# Patient Record
Sex: FEMALE | Race: WHITE | NOT HISPANIC OR LATINO | URBAN - METROPOLITAN AREA
[De-identification: names, ages, dates, MRNs, and addresses within clinical notes are randomized per-mention and may not be internally consistent; named-entity substitution may affect disease eponyms.]

---

## 2017-01-03 ENCOUNTER — TELEPHONE (OUTPATIENT)
Dept: HEMATOLOGY/ONCOLOGY | Facility: CLINIC | Age: 67
End: 2017-01-03

## 2017-01-03 NOTE — TELEPHONE ENCOUNTER
Returned call. Patient is scheduled for Thursday at 1130 in the OR for the bone marrow biopsy. Patient asked questions regarding what she needs to be prepared. Instructed to the patient that the nurse from the OR will call her the day before the procedure and will review the pre op procedures. Patient verbalized understanding. No other questions at this time.

## 2017-01-05 ENCOUNTER — SURGERY (OUTPATIENT)
Age: 67
End: 2017-01-05

## 2017-01-05 ENCOUNTER — ANESTHESIA EVENT (OUTPATIENT)
Dept: SURGERY | Facility: HOSPITAL | Age: 67
End: 2017-01-05
Payer: MEDICARE

## 2017-01-05 ENCOUNTER — ANESTHESIA (OUTPATIENT)
Dept: SURGERY | Facility: HOSPITAL | Age: 67
End: 2017-01-05
Payer: MEDICARE

## 2017-01-05 PROCEDURE — D9220A PRA ANESTHESIA: Mod: ANES,,, | Performed by: ANESTHESIOLOGY

## 2017-01-05 PROCEDURE — 25000003 PHARM REV CODE 250: Performed by: NURSE ANESTHETIST, CERTIFIED REGISTERED

## 2017-01-05 PROCEDURE — 63600175 PHARM REV CODE 636 W HCPCS: Performed by: NURSE ANESTHETIST, CERTIFIED REGISTERED

## 2017-01-05 PROCEDURE — D9220A PRA ANESTHESIA: Mod: CRNA,,, | Performed by: NURSE ANESTHETIST, CERTIFIED REGISTERED

## 2017-01-05 RX ORDER — ONDANSETRON 2 MG/ML
INJECTION INTRAMUSCULAR; INTRAVENOUS
Status: DISCONTINUED | OUTPATIENT
Start: 2017-01-05 | End: 2017-01-05

## 2017-01-05 RX ORDER — PROPOFOL 10 MG/ML
VIAL (ML) INTRAVENOUS
Status: DISCONTINUED | OUTPATIENT
Start: 2017-01-05 | End: 2017-01-05

## 2017-01-05 RX ORDER — LIDOCAINE HCL/PF 100 MG/5ML
SYRINGE (ML) INTRAVENOUS
Status: DISCONTINUED | OUTPATIENT
Start: 2017-01-05 | End: 2017-01-05

## 2017-01-05 RX ORDER — SODIUM CHLORIDE 9 MG/ML
INJECTION, SOLUTION INTRAVENOUS CONTINUOUS PRN
Status: DISCONTINUED | OUTPATIENT
Start: 2017-01-05 | End: 2017-01-05

## 2017-01-05 RX ADMIN — SODIUM CHLORIDE: 0.9 INJECTION, SOLUTION INTRAVENOUS at 12:01

## 2017-01-05 RX ADMIN — ONDANSETRON 4 MG: 2 INJECTION INTRAMUSCULAR; INTRAVENOUS at 01:01

## 2017-01-05 RX ADMIN — PROPOFOL 30 MG: 10 INJECTION, EMULSION INTRAVENOUS at 01:01

## 2017-01-05 RX ADMIN — LIDOCAINE HYDROCHLORIDE 100 MG: 20 INJECTION, SOLUTION INTRAVENOUS at 01:01

## 2017-01-05 RX ADMIN — PROPOFOL 50 MG: 10 INJECTION, EMULSION INTRAVENOUS at 01:01

## 2017-01-05 NOTE — TRANSFER OF CARE
"Anesthesia Transfer of Care Note    Patient: Susan Puente    Procedure(s) Performed: Procedure(s):  BIOPSY-BONE MARROW    Patient location: PACU    Anesthesia Type: general    Transport from OR: Transported from OR on room air with adequate spontaneous ventilation    Post pain: adequate analgesia    Post assessment: no apparent anesthetic complications    Post vital signs: stable    Level of consciousness: sedated    Nausea/Vomiting: no nausea/vomiting    Complications: none          Last vitals:   Visit Vitals    /60 (BP Location: Left arm, Patient Position: Lying, BP Method: Automatic)    Pulse 94    Temp 36.8 °C (98.2 °F) (Oral)    Resp 18    Ht 5' 5" (1.651 m)    Wt 84.4 kg (186 lb)    SpO2 95%    Breastfeeding No    BMI 30.95 kg/m2     "

## 2017-01-05 NOTE — ANESTHESIA PREPROCEDURE EVALUATION
01/05/2017  Susan Puente is a 66 y.o., female.    [facility] Anesthesia Evaluation         Review of Systems      Physical Exam  General:  Obesity    Airway/Jaw/Neck:  Airway Findings: Mouth Opening: Normal Tongue: Normal  General Airway Assessment: Adult  Mallampati: II  Improves to I with phonation.  TM Distance: Normal, at least 6 cm      Dental:  Dental Findings: In tact   Chest/Lungs:  Chest/Lungs Findings: Clear to auscultation, Normal Respiratory Rate     Heart/Vascular:  Heart Findings: Rate: Normal  Rhythm: Regular Rhythm  Sounds: Normal        Mental Status:  Mental Status Findings:  Cooperative, Alert and Oriented         Anesthesia Plan  Type of Anesthesia, risks & benefits discussed:  Anesthesia Type:  general, MAC  Patient's Preference: either  Intra-op Monitoring Plan:   Intra-op Monitoring Plan Comments:   Post Op Pain Control Plan:   Post Op Pain Control Plan Comments:   Induction:    Beta Blocker:  Patient is not currently on a Beta-Blocker (No further documentation required).       Informed Consent: Patient understands risks and agrees with Anesthesia plan.  Questions answered. Anesthesia consent signed with patient.  ASA Score: 2     Day of Surgery Review of History & Physical:    H&P update referred to the surgeon.         Ready For Surgery From Anesthesia Perspective.

## 2017-01-05 NOTE — ANESTHESIA POSTPROCEDURE EVALUATION
"Anesthesia Post Evaluation    Patient: Susan Puente    Procedure(s) Performed: Procedure(s):  BIOPSY-BONE MARROW    Final Anesthesia Type: general  Patient location during evaluation: PACU  Patient participation: Yes- Able to Participate  Level of consciousness: awake and alert  Post-procedure vital signs: reviewed and stable  Pain management: adequate  Airway patency: patent  PONV status at discharge: No PONV  Anesthetic complications: no      Cardiovascular status: blood pressure returned to baseline and hemodynamically stable  Respiratory status: unassisted, spontaneous ventilation and room air  Hydration status: euvolemic  Follow-up not needed.        Visit Vitals    BP (!) 83/49 (BP Location: Left arm, Patient Position: Lying, BP Method: Automatic)    Pulse 85    Temp 37 °C (98.6 °F) (Oral)    Resp 18    Ht 5' 5" (1.651 m)    Wt 84.4 kg (186 lb)    SpO2 95%    Breastfeeding No    BMI 30.95 kg/m2       Pain/Caitlyn Score: Pain Assessment Performed: Yes (1/5/2017  1:15 PM)  Presence of Pain: denies (1/5/2017  1:15 PM)  Caitlyn Score: 10 (1/5/2017  1:15 PM)      "

## 2017-01-05 NOTE — ANESTHESIA RELEASE NOTE
"Anesthesia Release from PACU Note    Patient: Susan Puente    Procedure(s) Performed: Procedure(s):  BIOPSY-BONE MARROW    Anesthesia type: general    Post pain: Adequate analgesia    Post assessment: no apparent anesthetic complications, tolerated procedure well and no evidence of recall    Last Vitals:   Visit Vitals    BP (!) 83/49 (BP Location: Left arm, Patient Position: Lying, BP Method: Automatic)    Pulse 85    Temp 37 °C (98.6 °F) (Oral)    Resp 18    Ht 5' 5" (1.651 m)    Wt 84.4 kg (186 lb)    SpO2 95%    Breastfeeding No    BMI 30.95 kg/m2       Post vital signs: stable    Level of consciousness: awake, alert  and oriented    Nausea/Vomiting: no nausea/no vomiting    Complications: none    Airway Patency: patent    Respiratory: unassisted    Cardiovascular: stable and blood pressure at baseline    Hydration: euvolemic  "

## 2017-01-06 DIAGNOSIS — D64.9 ANEMIA REQUIRING TRANSFUSIONS: Primary | ICD-10-CM

## 2017-01-12 ENCOUNTER — NURSE TRIAGE (OUTPATIENT)
Dept: ADMINISTRATIVE | Facility: CLINIC | Age: 67
End: 2017-01-12

## 2017-01-12 NOTE — TELEPHONE ENCOUNTER
Reason for Disposition   Unexplained headache that is present > 24 hours    Protocols used: ST HEADACHE-A-OH  Ms. Puente has been experiencing a headache x 2 days. She states her blood pressure is 150/69 with pulse of 108, blood sugar is 124. She denies numbness or weakness. Patient is fatigued and has a history of anemia. She states she is concern that headache is a warning sign of stroke.

## 2017-01-13 ENCOUNTER — OFFICE VISIT (OUTPATIENT)
Dept: INTERNAL MEDICINE | Facility: CLINIC | Age: 67
End: 2017-01-13
Attending: INTERNAL MEDICINE
Payer: MEDICARE

## 2017-01-13 ENCOUNTER — TELEPHONE (OUTPATIENT)
Dept: INTERNAL MEDICINE | Facility: CLINIC | Age: 67
End: 2017-01-13

## 2017-01-13 ENCOUNTER — INFUSION (OUTPATIENT)
Dept: INFUSION THERAPY | Facility: HOSPITAL | Age: 67
End: 2017-01-13
Attending: INTERNAL MEDICINE
Payer: MEDICARE

## 2017-01-13 VITALS
OXYGEN SATURATION: 99 % | HEIGHT: 65 IN | WEIGHT: 183.19 LBS | TEMPERATURE: 98 F | HEART RATE: 90 BPM | DIASTOLIC BLOOD PRESSURE: 60 MMHG | BODY MASS INDEX: 30.52 KG/M2 | SYSTOLIC BLOOD PRESSURE: 125 MMHG

## 2017-01-13 VITALS
TEMPERATURE: 98 F | RESPIRATION RATE: 16 BRPM | DIASTOLIC BLOOD PRESSURE: 67 MMHG | SYSTOLIC BLOOD PRESSURE: 146 MMHG | HEART RATE: 82 BPM

## 2017-01-13 DIAGNOSIS — D64.9 ANEMIA, UNSPECIFIED: Primary | ICD-10-CM

## 2017-01-13 DIAGNOSIS — D64.9 ANEMIA REQUIRING TRANSFUSIONS: Primary | ICD-10-CM

## 2017-01-13 DIAGNOSIS — D46.C MDS (MYELODYSPLASTIC SYNDROME) WITH 5Q DELETION: ICD-10-CM

## 2017-01-13 DIAGNOSIS — Z00.00 HEALTHCARE MAINTENANCE: ICD-10-CM

## 2017-01-13 DIAGNOSIS — D46.9 MYELODYSPLASIA (MYELODYSPLASTIC SYNDROME): ICD-10-CM

## 2017-01-13 DIAGNOSIS — Z12.31 ENCOUNTER FOR SCREENING MAMMOGRAM FOR BREAST CANCER: Primary | ICD-10-CM

## 2017-01-13 DIAGNOSIS — D63.8 ANEMIA OF OTHER CHRONIC DISEASE: Primary | ICD-10-CM

## 2017-01-13 DIAGNOSIS — D46.C MDS (MYELODYSPLASTIC SYNDROME) WITH 5Q DELETION: Primary | ICD-10-CM

## 2017-01-13 DIAGNOSIS — E11.9 CONTROLLED TYPE 2 DIABETES MELLITUS WITHOUT COMPLICATION, WITHOUT LONG-TERM CURRENT USE OF INSULIN: ICD-10-CM

## 2017-01-13 LAB
ABO + RH BLD: NORMAL
BLD GP AB SCN CELLS X3 SERPL QL: NORMAL
BLD PROD TYP BPU: NORMAL
BLOOD UNIT EXPIRATION DATE: NORMAL
BLOOD UNIT TYPE CODE: 6200
BLOOD UNIT TYPE: NORMAL
CODING SYSTEM: NORMAL
DISPENSE STATUS: NORMAL
TRANS ERYTHROCYTES VOL PATIENT: NORMAL ML

## 2017-01-13 PROCEDURE — 2022F DILAT RTA XM EVC RTNOPTHY: CPT | Mod: S$GLB,,, | Performed by: INTERNAL MEDICINE

## 2017-01-13 PROCEDURE — 4010F ACE/ARB THERAPY RXD/TAKEN: CPT | Mod: S$GLB,,, | Performed by: INTERNAL MEDICINE

## 2017-01-13 PROCEDURE — 86900 BLOOD TYPING SEROLOGIC ABO: CPT

## 2017-01-13 PROCEDURE — 99999 PR PBB SHADOW E&M-EST. PATIENT-LVL III: CPT | Mod: PBBFAC,,, | Performed by: INTERNAL MEDICINE

## 2017-01-13 PROCEDURE — 3078F DIAST BP <80 MM HG: CPT | Mod: S$GLB,,, | Performed by: INTERNAL MEDICINE

## 2017-01-13 PROCEDURE — 1160F RVW MEDS BY RX/DR IN RCRD: CPT | Mod: S$GLB,,, | Performed by: INTERNAL MEDICINE

## 2017-01-13 PROCEDURE — 99215 OFFICE O/P EST HI 40 MIN: CPT | Mod: S$GLB,,, | Performed by: INTERNAL MEDICINE

## 2017-01-13 PROCEDURE — 86901 BLOOD TYPING SEROLOGIC RH(D): CPT

## 2017-01-13 PROCEDURE — 1159F MED LIST DOCD IN RCRD: CPT | Mod: S$GLB,,, | Performed by: INTERNAL MEDICINE

## 2017-01-13 PROCEDURE — 25000003 PHARM REV CODE 250: Performed by: INTERNAL MEDICINE

## 2017-01-13 PROCEDURE — 3074F SYST BP LT 130 MM HG: CPT | Mod: S$GLB,,, | Performed by: INTERNAL MEDICINE

## 2017-01-13 PROCEDURE — 36430 TRANSFUSION BLD/BLD COMPNT: CPT

## 2017-01-13 PROCEDURE — 3044F HG A1C LEVEL LT 7.0%: CPT | Mod: S$GLB,,, | Performed by: INTERNAL MEDICINE

## 2017-01-13 PROCEDURE — 1157F ADVNC CARE PLAN IN RCRD: CPT | Mod: S$GLB,,, | Performed by: INTERNAL MEDICINE

## 2017-01-13 PROCEDURE — 1125F AMNT PAIN NOTED PAIN PRSNT: CPT | Mod: S$GLB,,, | Performed by: INTERNAL MEDICINE

## 2017-01-13 RX ORDER — HYDROCODONE BITARTRATE AND ACETAMINOPHEN 500; 5 MG/1; MG/1
TABLET ORAL
Status: DISCONTINUED | OUTPATIENT
Start: 2017-01-13 | End: 2017-02-02

## 2017-01-13 RX ORDER — ACETAMINOPHEN 325 MG/1
650 TABLET ORAL
Status: COMPLETED | OUTPATIENT
Start: 2017-01-13 | End: 2017-01-13

## 2017-01-13 RX ORDER — HYDROCODONE BITARTRATE AND ACETAMINOPHEN 500; 5 MG/1; MG/1
TABLET ORAL ONCE
Status: CANCELLED | OUTPATIENT
Start: 2017-01-13 | End: 2017-01-13

## 2017-01-13 RX ORDER — HYDROCODONE BITARTRATE AND ACETAMINOPHEN 500; 5 MG/1; MG/1
TABLET ORAL ONCE
Status: COMPLETED | OUTPATIENT
Start: 2017-01-13 | End: 2017-01-13

## 2017-01-13 RX ORDER — ACETAMINOPHEN 325 MG/1
650 TABLET ORAL
Status: DISCONTINUED | OUTPATIENT
Start: 2017-01-13 | End: 2019-03-13

## 2017-01-13 RX ORDER — DIPHENHYDRAMINE HCL 25 MG
25 CAPSULE ORAL
Status: CANCELLED | OUTPATIENT
Start: 2017-01-13

## 2017-01-13 RX ORDER — DIPHENHYDRAMINE HCL 25 MG
25 CAPSULE ORAL
Status: DISCONTINUED | OUTPATIENT
Start: 2017-01-13 | End: 2017-06-08

## 2017-01-13 RX ORDER — ACETAMINOPHEN 325 MG/1
650 TABLET ORAL
Status: CANCELLED | OUTPATIENT
Start: 2017-01-13

## 2017-01-13 RX ORDER — DIPHENHYDRAMINE HCL 25 MG
25 CAPSULE ORAL
Status: COMPLETED | OUTPATIENT
Start: 2017-01-13 | End: 2017-01-13

## 2017-01-13 RX ADMIN — DIPHENHYDRAMINE HYDROCHLORIDE 25 MG: 25 CAPSULE ORAL at 05:01

## 2017-01-13 RX ADMIN — ACETAMINOPHEN 650 MG: 325 TABLET ORAL at 05:01

## 2017-01-13 RX ADMIN — SODIUM CHLORIDE: 0.9 INJECTION, SOLUTION INTRAVENOUS at 05:01

## 2017-01-13 NOTE — MR AVS SNAPSHOT
[facility] - [facility] Medicine  [address]  [facility] [address]  Phone: [phone]  Fax: [phone]                  Susan Puente   2017 1:00 PM   Office Visit    Description:  Female : 1950   Provider:  Claudia Rapp MD   Department:  [facility] Internal Medicine           Reason for Visit     Sinus Problem           Diagnoses this Visit        Comments    Anemia requiring transfusions    -  Primary     Controlled type 2 diabetes mellitus without complication, without long-term current use of insulin         Myelodysplasia (myelodysplastic syndrome)                To Do List           Future Appointments        Provider Department Dept Phone    2017 1:40 PM LAB, APPOINTMENT [facility] [phone]    2017 8:00 AM Claudia Rapp MD [facility] Internal Medicine [phone]    2017 9:00 AM LAB, [facility]ONC SAME DAY [facility] [phone]    2017 10:00 AM Gita Valdes MD [facility] [phone]      Goals (5 Years of Data)     None      [facility] On Call     [facility] On [facility] Nurse Care Line - 24/7 Assistance  Registered nurses in the [facility] provide clinical advisement, health education, appointment booking, and other advisory services.  Call for this free service at [phone].             Medications           Message regarding Medications     Verify the changes and/or additions to your medication regime listed below are the same as discussed with your clinician today.  If any of these changes or additions are incorrect, please notify your healthcare provider.             Verify that the below list of medications is an accurate representation of the medications you are currently taking.  If none reported, the list may be blank. If incorrect, please contact your healthcare provider. Carry this list with you in case of emergency.           Current Medications      "acetaminophen (TYLENOL ARTHRITIS PAIN) 650 MG TbSR Take 650 mg by mouth every 8 (eight) hours as needed (for pain).    aspirin (ECOTRIN) 81 MG EC tablet Take 81 mg by mouth once daily.    b complex vitamins capsule Take 1 capsule by mouth once daily.    fexofenadine (ALLEGRA) 180 MG tablet Take 1 tablet (180 mg total) by mouth once daily.    fish oil-omega-3 fatty acids 300-1,000 mg capsule Take 4 g by mouth nightly.     lisinopril-hydrochlorothiazide (PRINZIDE,ZESTORETIC) 20-12.5 mg per tablet Take 2 tablets by mouth once daily.    metformin (GLUCOPHAGE) 500 MG tablet Take 0.5 tablets (250 mg total) by mouth 2 (two) times daily with meals.           Clinical Reference Information           Vital Signs - Last Recorded  Most recent update: 1/13/2017  1:11 PM by Sade Maurer MA    BP Pulse Temp Ht Wt SpO2    125/60 90 98.4 °F (36.9 °C) 5' 5" (1.651 m) 83.1 kg (183 lb 3.2 oz) 99%    BMI                30.49 kg/m2          Blood Pressure          Most Recent Value    BP  125/60      Allergies as of 1/13/2017     No Known Allergies      Immunizations Administered on Date of Encounter - 1/13/2017     None      Orders Placed During Today's Visit     Future Labs/Procedures Expected by Expires    CBC auto differential  1/13/2017 3/14/2018    Comprehensive metabolic panel  1/13/2017 4/13/2017      Smoking Cessation     If you would like to quit smoking:   You may be eligible for free services if you are a [place] resident and started smoking cigarettes before September 1, 1988.  Call the [employer]oking [employer]essation [employer] ([employer]) toll free at (272) [phone] or [phone]) [phone].   Call 2-469-QUIT-NOW if you do not meet the above criteria.            "

## 2017-01-13 NOTE — PROGRESS NOTES
"Subjective:       Patient ID: Susan Puente is a 66 y.o. female.    Chief Complaint: Sinus Problem    HPI   65 yo F here for urgent eval of headache and fatigue.   She had anemia requiring transfusion, recent bone marrow bx with MDS.   Very fatigue, spends most of day in recliner.     Labs on 12/16/16 hgb 7.5.     She just accepted book keeping job. Unsure if is going to be able to work full time with recent dx.     Review of Systems   Constitutional: Positive for fatigue. Negative for fever.   Respiratory: Negative for shortness of breath.    Cardiovascular: Negative for chest pain.        Pale   Musculoskeletal: Negative.    Skin: Negative.        Objective:     Visit Vitals    /60    Pulse 90    Temp 98.4 °F (36.9 °C)    Ht 5' 5" (1.651 m)    Wt 83.1 kg (183 lb 3.2 oz)    SpO2 99%    BMI 30.49 kg/m2        Physical Exam   Constitutional: She is oriented to person, place, and time. She appears well-developed and well-nourished. No distress.   HENT:   Head: Normocephalic and atraumatic.   ttp over ethmoidal sinuses.   Erythematous nasal turbinates with mild clear-yellow rhinorrhea     Cardiovascular: Normal rate and regular rhythm.    Pale conjunctiva   Pulmonary/Chest: Effort normal. No respiratory distress. She has no wheezes. She has no rales.   Neurological: She is alert and oriented to person, place, and time.   Skin: Skin is warm and dry. She is not diaphoretic.   Psychiatric: She has a normal mood and affect. Her behavior is normal.       Assessment:       1. Anemia requiring transfusions    2. Controlled type 2 diabetes mellitus without complication, without long-term current use of insulin    3. Myelodysplasia (myelodysplastic syndrome)        Plan:       Susan was seen today for sinus problem.    Diagnoses and all orders for this visit:    Anemia requiring transfusions; Myelodysplasia (myelodysplastic syndrome)  -     CBC auto differential; Future  -     Comprehensive metabolic panel; " Future    hgb 5.7. I spoke with Dr. Valdes's office. They will transfuse her 1 uprbc today.   Keep follow up appt next week.     Over 40 minutes were spent with patient with over half of this time spent in coordination of care.

## 2017-01-13 NOTE — MR AVS SNAPSHOT
Patient Information     Patient Name Sex Susan Cheung Female 1950      Visit Information        Provider Department Dept Phone Center    2017 4:00 PM [facility], CHEMO [facility]fusion [phone] F[facility] Jones      Patient Instructions     None      Your Current Medications Are     acetaminophen (TYLENOL ARTHRITIS PAIN) 650 MG TbSR    aspirin (ECOTRIN) 81 MG EC tablet    b complex vitamins capsule    fexofenadine (ALLEGRA) 180 MG tablet    fish oil-omega-3 fatty acids 300-1,000 mg capsule    lisinopril-hydrochlorothiazide (PRINZIDE,ZESTORETIC) 20-12.5 mg per tablet    metformin (GLUCOPHAGE) 500 MG tablet      Facility-Administered Medications     0.9%  NaCl infusion (for blood administration)    0.9%  NaCl infusion (for blood administration)    acetaminophen tablet 650 mg    acetaminophen tablet 650 mg    diphenhydrAMINE capsule 25 mg    diphenhydrAMINE capsule 25 mg      Appointments for Next Year     2017  8:00 AM ESTABLISHED PATIENT (20 min.) [facility] Claudia Rapp MD    Arrive at check-in approximately 15 minutes before your scheduled appointment time. Bring all outside medical records and imaging, along with a list of your current medications and insurance card.    [facility] Bld[facility]    2017  9:00 AM NON FASTING LAB (15 min.) [facility], [facility]EM[facility] SAME DAY    Arrive at check-in approximately 15 minutes before your scheduled appointment time. Bring all outside medical records and imaging, along with a list of your current medications and insurance card.    [facility] 3rd Floor    2017 10:00 AM ESTABLISHED PATIENT (30 min.) [facility] Gita Valdes MD    Arrive at check-in approximately 15 minutes before your scheduled appointment time. Bring all outside medical records and imaging, along with a list of your current medications and insurance card.    [facility]  "Center         Default Flowsheet Data (last 24 hours)      Amb Complex Vitals Pj        01/13/17 1834 01/13/17 1814 01/13/17 1310          Measurements    Weight   83.1 kg (183 lb 3.2 oz)      Height   5' 5" (1.651 m)      BSA (Calculated - sq m)   1.95 sq meters      BMI (Calculated)   30.6      BP (!)  116/58 (!)  147/65 125/60      Temp 98.2 °F (36.8 °C) 98.2 °F (36.8 °C) 98.4 °F (36.9 °C)      Pulse 81 81 90      Resp 16 16       SpO2   99 %      Pain Assessment    Pain Score   Two              Allergies     No Known Allergies      Medications You Received from 01/12/2017 1937 to 01/13/2017 1937        Date/Time Order Dose Route Action     01/13/2017 1709 0.9%  NaCl infusion (for blood administration)   Intravenous New Bag     01/13/2017 1719 acetaminophen tablet 650 mg 650 mg Oral Given     01/13/2017 1719 diphenhydrAMINE capsule 25 mg 25 mg Oral Given      Current Discharge Medication List     Cannot display discharge medications since this is not an admission.      "

## 2017-01-13 NOTE — TELEPHONE ENCOUNTER
Received critical lab values from hematology. Person reporting critical lab name Kaylee. Hemoglobin 5.7 Hematocrit 17.1 Dr Rapp notified of critical values.

## 2017-01-14 NOTE — PLAN OF CARE
Problem: Patient Care Overview  Goal: Plan of Care Review  Outcome: Ongoing (interventions implemented as appropriate)  Patient completed 1 unit of prbc's with no signs/symptoms of reaction noted. Verbalized understanding to call MD for any questions/concerns. AVS given. Discharged home.

## 2017-01-16 PROBLEM — I70.0 CALCIFICATION OF AORTA: Status: ACTIVE | Noted: 2017-01-16

## 2017-01-16 PROBLEM — N18.30 STAGE 3 CHRONIC KIDNEY DISEASE: Status: ACTIVE | Noted: 2017-01-16

## 2017-01-19 ENCOUNTER — OFFICE VISIT (OUTPATIENT)
Dept: HEMATOLOGY/ONCOLOGY | Facility: CLINIC | Age: 67
End: 2017-01-19
Payer: MEDICARE

## 2017-01-19 ENCOUNTER — CLINICAL SUPPORT (OUTPATIENT)
Dept: SMOKING CESSATION | Facility: CLINIC | Age: 67
End: 2017-01-19
Payer: COMMERCIAL

## 2017-01-19 ENCOUNTER — OFFICE VISIT (OUTPATIENT)
Dept: INTERNAL MEDICINE | Facility: CLINIC | Age: 67
End: 2017-01-19
Payer: MEDICARE

## 2017-01-19 ENCOUNTER — LAB VISIT (OUTPATIENT)
Dept: LAB | Facility: HOSPITAL | Age: 67
End: 2017-01-19
Payer: MEDICARE

## 2017-01-19 VITALS
HEART RATE: 108 BPM | DIASTOLIC BLOOD PRESSURE: 68 MMHG | SYSTOLIC BLOOD PRESSURE: 140 MMHG | WEIGHT: 183.19 LBS | HEIGHT: 65 IN | BODY MASS INDEX: 30.52 KG/M2 | OXYGEN SATURATION: 93 % | TEMPERATURE: 100 F

## 2017-01-19 VITALS
DIASTOLIC BLOOD PRESSURE: 50 MMHG | HEART RATE: 91 BPM | TEMPERATURE: 99 F | WEIGHT: 183.19 LBS | HEIGHT: 65 IN | SYSTOLIC BLOOD PRESSURE: 116 MMHG | BODY MASS INDEX: 30.52 KG/M2 | OXYGEN SATURATION: 95 % | RESPIRATION RATE: 16 BRPM

## 2017-01-19 DIAGNOSIS — I70.0 CALCIFICATION OF AORTA: ICD-10-CM

## 2017-01-19 DIAGNOSIS — D46.C MDS (MYELODYSPLASTIC SYNDROME) WITH 5Q DELETION: Primary | ICD-10-CM

## 2017-01-19 DIAGNOSIS — D64.9 ANEMIA REQUIRING TRANSFUSIONS: ICD-10-CM

## 2017-01-19 DIAGNOSIS — N18.30 CONTROLLED TYPE 2 DIABETES MELLITUS WITH STAGE 3 CHRONIC KIDNEY DISEASE, WITHOUT LONG-TERM CURRENT USE OF INSULIN: Primary | ICD-10-CM

## 2017-01-19 DIAGNOSIS — D46.9 MYELODYSPLASTIC SYNDROME: ICD-10-CM

## 2017-01-19 DIAGNOSIS — F17.210 CIGARETTE SMOKER: Primary | ICD-10-CM

## 2017-01-19 DIAGNOSIS — E11.22 CONTROLLED TYPE 2 DIABETES MELLITUS WITH STAGE 3 CHRONIC KIDNEY DISEASE, WITHOUT LONG-TERM CURRENT USE OF INSULIN: Primary | ICD-10-CM

## 2017-01-19 DIAGNOSIS — Z00.00 HEALTH CARE MAINTENANCE: ICD-10-CM

## 2017-01-19 DIAGNOSIS — I77.9 BILATERAL CAROTID ARTERY DISEASE: ICD-10-CM

## 2017-01-19 LAB
ALBUMIN SERPL BCP-MCNC: 3.8 G/DL
ALP SERPL-CCNC: 67 U/L
ALT SERPL W/O P-5'-P-CCNC: 19 U/L
ANION GAP SERPL CALC-SCNC: 10 MMOL/L
ANISOCYTOSIS BLD QL SMEAR: ABNORMAL
AST SERPL-CCNC: 18 U/L
BASOPHILS # BLD AUTO: 0.04 K/UL
BASOPHILS NFR BLD: 0.7 %
BILIRUB SERPL-MCNC: 0.4 MG/DL
BUN SERPL-MCNC: 20 MG/DL
CALCIUM SERPL-MCNC: 10.3 MG/DL
CHLORIDE SERPL-SCNC: 105 MMOL/L
CO2 SERPL-SCNC: 26 MMOL/L
CREAT SERPL-MCNC: 1 MG/DL
DIFFERENTIAL METHOD: ABNORMAL
DOHLE BOD BLD QL SMEAR: PRESENT
EOSINOPHIL # BLD AUTO: 0.2 K/UL
EOSINOPHIL NFR BLD: 3.6 %
ERYTHROCYTE [DISTWIDTH] IN BLOOD BY AUTOMATED COUNT: ABNORMAL %
EST. GFR  (AFRICAN AMERICAN): >60 ML/MIN/1.73 M^2
EST. GFR  (NON AFRICAN AMERICAN): 58.8 ML/MIN/1.73 M^2
GLUCOSE SERPL-MCNC: 104 MG/DL
HCT VFR BLD AUTO: 20.8 %
HGB BLD-MCNC: 7.1 G/DL
LYMPHOCYTES # BLD AUTO: 1.6 K/UL
LYMPHOCYTES NFR BLD: 29.3 %
MCH RBC QN AUTO: 37.8 PG
MCHC RBC AUTO-ENTMCNC: 34.1 %
MCV RBC AUTO: 111 FL
MONOCYTES # BLD AUTO: 0.3 K/UL
MONOCYTES NFR BLD: 4.7 %
NEUTROPHILS # BLD AUTO: 3.4 K/UL
NEUTROPHILS NFR BLD: 61.7 %
OVALOCYTES BLD QL SMEAR: ABNORMAL
PLATELET # BLD AUTO: 351 K/UL
PMV BLD AUTO: 10.5 FL
POIKILOCYTOSIS BLD QL SMEAR: SLIGHT
POLYCHROMASIA BLD QL SMEAR: ABNORMAL
POTASSIUM SERPL-SCNC: 3.9 MMOL/L
PROT SERPL-MCNC: 7.6 G/DL
RBC # BLD AUTO: 1.88 M/UL
SODIUM SERPL-SCNC: 141 MMOL/L
WBC # BLD AUTO: 5.59 K/UL

## 2017-01-19 PROCEDURE — 3078F DIAST BP <80 MM HG: CPT | Mod: S$GLB,,, | Performed by: INTERNAL MEDICINE

## 2017-01-19 PROCEDURE — 80053 COMPREHEN METABOLIC PANEL: CPT

## 2017-01-19 PROCEDURE — 1126F AMNT PAIN NOTED NONE PRSNT: CPT | Mod: S$GLB,,, | Performed by: INTERNAL MEDICINE

## 2017-01-19 PROCEDURE — 1160F RVW MEDS BY RX/DR IN RCRD: CPT | Mod: S$GLB,,, | Performed by: INTERNAL MEDICINE

## 2017-01-19 PROCEDURE — 2022F DILAT RTA XM EVC RTNOPTHY: CPT | Mod: S$GLB,,, | Performed by: INTERNAL MEDICINE

## 2017-01-19 PROCEDURE — 4010F ACE/ARB THERAPY RXD/TAKEN: CPT | Mod: S$GLB,,, | Performed by: INTERNAL MEDICINE

## 2017-01-19 PROCEDURE — 1159F MED LIST DOCD IN RCRD: CPT | Mod: S$GLB,,, | Performed by: INTERNAL MEDICINE

## 2017-01-19 PROCEDURE — 3077F SYST BP >= 140 MM HG: CPT | Mod: S$GLB,,, | Performed by: INTERNAL MEDICINE

## 2017-01-19 PROCEDURE — 1157F ADVNC CARE PLAN IN RCRD: CPT | Mod: S$GLB,,, | Performed by: INTERNAL MEDICINE

## 2017-01-19 PROCEDURE — 99999 PR PBB SHADOW E&M-EST. PATIENT-LVL III: CPT | Mod: PBBFAC,,, | Performed by: INTERNAL MEDICINE

## 2017-01-19 PROCEDURE — 36415 COLL VENOUS BLD VENIPUNCTURE: CPT

## 2017-01-19 PROCEDURE — 3044F HG A1C LEVEL LT 7.0%: CPT | Mod: S$GLB,,, | Performed by: INTERNAL MEDICINE

## 2017-01-19 PROCEDURE — 99999 PR PBB SHADOW E&M-EST. PATIENT-LVL IV: CPT | Mod: PBBFAC,,, | Performed by: INTERNAL MEDICINE

## 2017-01-19 PROCEDURE — 3074F SYST BP LT 130 MM HG: CPT | Mod: S$GLB,,, | Performed by: INTERNAL MEDICINE

## 2017-01-19 PROCEDURE — 99214 OFFICE O/P EST MOD 30 MIN: CPT | Mod: 25,S$GLB,, | Performed by: INTERNAL MEDICINE

## 2017-01-19 PROCEDURE — 99404 PREV MED CNSL INDIV APPRX 60: CPT | Mod: S$GLB,,,

## 2017-01-19 PROCEDURE — 85025 COMPLETE CBC W/AUTO DIFF WBC: CPT

## 2017-01-19 PROCEDURE — 99214 OFFICE O/P EST MOD 30 MIN: CPT | Mod: S$GLB,,, | Performed by: INTERNAL MEDICINE

## 2017-01-19 RX ORDER — IBUPROFEN 200 MG
1 TABLET ORAL DAILY
Qty: 28 PATCH | Refills: 0 | Status: SHIPPED | OUTPATIENT
Start: 2017-01-19 | End: 2017-03-29 | Stop reason: SDUPTHER

## 2017-01-19 RX ORDER — LENALIDOMIDE 10 MG/1
10 CAPSULE ORAL DAILY
Qty: 30 EACH | Refills: 12 | Status: SHIPPED | OUTPATIENT
Start: 2017-01-19 | End: 2017-01-30 | Stop reason: SDUPTHER

## 2017-01-19 NOTE — PROGRESS NOTES
Subjective:       Patient ID: Susan Puente is a 66 y.o. female.    Chief Complaint: No chief complaint on file.    HPI Comments: Ms. Puente is a 66 year old female with hx of DM2, peripheral vascular disease, tobacco use, CAD, hyperlipidemia, hypertension who was hospitalized 11/10/16 for anemia. hgb 5.3  MCH 43.4 with normal WBC and platelets. Patient had normal iron stores. She was transfused 3 units of PRBCs and discharged home with hgb 8.7 on 11/11/16. She was referred for further evalutation of her anemia. On 12/16/16 patient had a normal SPEP and immunofixation. Slightly elevated kappa light chains with normal ration. Elevated vitamin b12, normal folate, JAYDEN was positive with a low titer and negative profile. Patient had a bone marrow biopsy 1/5/16 which showed the core biopsy is normocellular for age (40%); however, megakaryocytes are increased and show  frequent small, hypolobated forms. Additionally, a subset of the neutrophils are hypogranular. Blasts are not increased by either morphology (1.2%) or in the corresponding flow cytometric analysis. Fish detects a 5q deletion in 54.5% of nuclei. Cytogenetics reported 20 metaphases, 2 metaphases were normal and 18 metaphases had a 5q deletion. No additional cytogenetic abnormalities were detected. Findings consistent with 5q deletion syndrome.  Patient presents today to review her diagnosis and treatment plan. She unfortunately required another RBC transfusion. She received one unit of PRBC 1/13/17 for hgb 5.7. Today her hgb is 7.1. She has mild thrombocytosis with plt 351K.  She does not feel as fatigued as she did last week but notes marked weakness associated with stress. Plans to start working soon. She is attempting to quit smoking. She will attend her first smoking cessation session today.         Review of Systems   Constitutional: Positive for fatigue. Negative for chills, diaphoresis, fever and unexpected weight change.   HENT:  Negative for congestion, mouth sores and nosebleeds.    Eyes: Negative for photophobia and visual disturbance.   Respiratory: Negative for cough and shortness of breath.    Cardiovascular: Negative for chest pain and leg swelling.   Gastrointestinal: Negative for abdominal distention and abdominal pain.   Endocrine: Negative for polyphagia and polyuria.   Genitourinary: Negative for dysuria and hematuria.   Musculoskeletal: Negative for back pain and joint swelling.   Skin: Negative for color change and pallor.   Neurological: Negative for light-headedness and numbness.   Hematological: Negative for adenopathy. Does not bruise/bleed easily.   Psychiatric/Behavioral: Negative for agitation and behavioral problems.       Objective:      Physical Exam   Constitutional: She is oriented to person, place, and time. She appears well-developed and well-nourished.   HENT:   Left Ear: External ear normal.   Mouth/Throat: Oropharynx is clear and moist.   Eyes: Conjunctivae are normal. Pupils are equal, round, and reactive to light.   Neck: Normal range of motion.   Cardiovascular: Normal rate and regular rhythm.    Pulmonary/Chest: Effort normal and breath sounds normal.   Abdominal: Soft. Bowel sounds are normal.   Musculoskeletal: She exhibits no edema or tenderness.   Lymphadenopathy:     She has no cervical adenopathy.   Neurological: She is alert and oriented to person, place, and time. No cranial nerve deficit. Coordination normal.   Skin: Skin is warm and dry.   Psychiatric: She has a normal mood and affect. Her behavior is normal.       Assessment:       1. MDS (myelodysplastic syndrome) with 5q deletion        Plan:   Have discussed diagnosis of 5q deletion syndrome. This is a form of MDS characterized by its severe anemia, potential thrombocytosis and favorable prognosis. Lenalidomide can lead to RBC transfusion independence in at least two thirds of cases of the 5q- syndrome, two thirds of those responses persisting  after 2 years of treatment. Grade 3 or 4 neutropenia and thrombocytopenia, especially during the first 6 to 8 weeks of treatment, are the major side effect of lenalidomide. May also take some time for anemia to improve and can worsen initially as well. Will monitor closely with weekly blood counts during the first 4 weeks. Will see back in clinic after four weeks to assess for other potential side effects such as diarrhea, nausea, rash, ect. Will send script for lenalidomide 10 mg daily to our specialty pharmacy to determine coverage, approval requirements, and distribution logistics.   Continue to encourage smoking cessation.  All questions answered to satisfaction  Case discussed with Dr. Lucio Contreras MD   Pager [phone]

## 2017-01-19 NOTE — PROGRESS NOTES
"Subjective:       Patient ID: Susan Puente is a 66 y.o. female.    Chief Complaint: Follow-up    HPI   67 yo F here for follow up fatigue and headaches. Last week had hgb 5 and received 1 uprbc with improvement in symptoms. New dx MDS and has appt with Dr. Valdes later this am.   No longer having shortness of breath with exertion.     a1c 11/10/16 5.7%    Going to smoking cessation later this morning.     DM  Highest BG has been 139.  Previously using one touch. Now has Freestyle from a friend.    Review of Systems   Constitutional: Negative for fever.   Respiratory: Negative for shortness of breath.    Cardiovascular: Negative for chest pain.   Musculoskeletal: Negative.    Skin: Negative.        Objective:     Visit Vitals    BP (!) 140/68    Pulse 108    Temp 100 °F (37.8 °C)    Ht 5' 5" (1.651 m)    Wt 83.1 kg (183 lb 3.2 oz)    SpO2 (!) 93%    BMI 30.49 kg/m2        Physical Exam   Constitutional: She is oriented to person, place, and time. She appears well-developed and well-nourished. No distress.   HENT:   Head: Normocephalic and atraumatic.   Cardiovascular: Normal rate and regular rhythm.    Pulmonary/Chest: Effort normal. No respiratory distress. She has no wheezes. She has no rales.   Neurological: She is alert and oriented to person, place, and time.   Skin: Skin is warm and dry. She is not diaphoretic.   Psychiatric: She has a normal mood and affect. Her behavior is normal.       Protective Sensation (w/ 10 gram monofilament):  Right: Decreased  Left: Intact    Visual Inspection:  Normal -  Bilateral    Pedal Pulses:   Right: Present  Left: Present    Posterior tibialis:   Right:Present  Left: Present      Assessment:       1. Controlled type 2 diabetes mellitus with stage 3 chronic kidney disease, without long-term current use of insulin    2. Anemia requiring transfusions    3. Myelodysplastic syndrome    4. Bilateral carotid artery disease    5. Calcification of aorta        Plan:     "   Susan was seen today for follow-up.    Diagnoses and all orders for this visit:    Controlled type 2 diabetes mellitus with stage 3 chronic kidney disease, without long-term current use of insulin  -     Ambulatory referral to Optometry  -     Ambulatory Referral to Diabetes Education    Anemia requiring transfusions  Myelodysplastic syndrome  Labs then appt with Dr. Valdes today     Bilateral carotid artery disease  Calcification of aorta  Stable, monitor      HCM  mmg ordered  She reports she had zoster vaccine at [facility] in 2016  Will do tdap at pharmacy

## 2017-01-19 NOTE — Clinical Note
Patient needs weekly cbc for four weeks. In four weeks she needs to see me and Dr. Valdes with a cmp as well. Thank you

## 2017-01-19 NOTE — MR AVS SNAPSHOT
[facility]  [address]  [facility] [address]  Phone: [phone]  Fax: [phone]                  Susan Puente   2017 8:00 AM   Office Visit    Description:  Female : 1950   Provider:  Claudia Rapp MD   Department:  [facility]           Reason for Visit     Follow-up           Diagnoses this Visit        Comments    Controlled type 2 diabetes mellitus with stage 3 chronic kidney disease, without long-term current use of insulin    -  Primary     Anemia requiring transfusions         Myelodysplastic syndrome         Bilateral carotid artery disease         Calcification of aorta                To Do List           Future Appointments        Provider Department Dept Phone    2017 9:00 AM LAB, [facility] SAME DAY [facility] [phone]    2017 10:00 AM Gita Valdes MD [facility] [phone]      Goals (5 Years of Data)     None      Follow-Up and Disposition     Return in about 3 months (around 2017).      [facility]     [facility] Care [facility] - 24/7 Assistance  Registered nurses in the [facility] provide clinical advisement, health education, appointment booking, and other advisory services.  Call for this free service at [phone].             Medications           Message regarding Medications     Verify the changes and/or additions to your medication regime listed below are the same as discussed with your clinician today.  If any of these changes or additions are incorrect, please notify your healthcare provider.             Verify that the below list of medications is an accurate representation of the medications you are currently taking.  If none reported, the list may be blank. If incorrect, please contact your healthcare provider. Carry this list with you in case of emergency.           Current Medications     acetaminophen (TYLENOL ARTHRITIS  "PAIN) 650 MG TbSR Take 650 mg by mouth every 8 (eight) hours as needed (for pain).    aspirin (ECOTRIN) 81 MG EC tablet Take 81 mg by mouth once daily.    b complex vitamins capsule Take 1 capsule by mouth once daily.    fexofenadine (ALLEGRA) 180 MG tablet Take 1 tablet (180 mg total) by mouth once daily.    fish oil-omega-3 fatty acids 300-1,000 mg capsule Take 4 g by mouth nightly.     lisinopril-hydrochlorothiazide (PRINZIDE,ZESTORETIC) 20-12.5 mg per tablet Take 2 tablets by mouth once daily.    metformin (GLUCOPHAGE) 500 MG tablet Take 0.5 tablets (250 mg total) by mouth 2 (two) times daily with meals.           Clinical Reference Information           Vital Signs - Last Recorded  Most recent update: 1/19/2017  8:38 AM by Claudia Rapp MD    BP Pulse Temp Ht Wt SpO2    (!) 140/68 108 100 °F (37.8 °C) 5' 5" (1.651 m) 83.1 kg (183 lb 3.2 oz) (!) 93%    BMI                30.49 kg/m2          Blood Pressure          Most Recent Value    BP  (!)  140/68      Allergies as of 1/19/2017     No Known Allergies      Immunizations Administered on Date of Encounter - 1/19/2017     None      Orders Placed During Today's Visit      Normal Orders This Visit    Ambulatory Referral to Diabetes Education     Ambulatory referral to Optometry       Smoking Cessation     If you would like to quit smoking:   You may be eligible for free services if you are a [place] resident and started smoking cigarettes before September 1, 1988.  Call the Smoking Cessation Trust (SCT) toll free at [phone] or [phone].   Call 6-019-QUIT-NOW if you do not meet the above criteria.            "

## 2017-01-19 NOTE — PROGRESS NOTES
1/19/17   See Smoking Cessation Smart Form    Additional Interventions:  · Recommended patient participate in Smoking Cessation Group .  · Discussed triggers and planning for quit date.  · Given patient education handouts from [employer] Tool Kit #3  · Educated patient about and gave patient education handouts from  [employer] Drug Information on: NRT  · Provided phone number to reach [facility] CTTS (Certified Tobacco Treatment Specialist) for future assistance and numbers to 24/7 Quit lines.

## 2017-01-20 ENCOUNTER — TELEPHONE (OUTPATIENT)
Dept: DIABETES | Facility: CLINIC | Age: 67
End: 2017-01-20

## 2017-01-23 ENCOUNTER — TELEPHONE (OUTPATIENT)
Dept: HEMATOLOGY/ONCOLOGY | Facility: CLINIC | Age: 67
End: 2017-01-23

## 2017-01-25 ENCOUNTER — TELEPHONE (OUTPATIENT)
Dept: PHARMACY | Facility: CLINIC | Age: 67
End: 2017-01-25

## 2017-01-26 ENCOUNTER — TELEPHONE (OUTPATIENT)
Dept: HEMATOLOGY/ONCOLOGY | Facility: CLINIC | Age: 67
End: 2017-01-26

## 2017-01-26 ENCOUNTER — TELEPHONE (OUTPATIENT)
Dept: PHARMACY | Facility: CLINIC | Age: 67
End: 2017-01-26

## 2017-01-26 NOTE — TELEPHONE ENCOUNTER
----- Message from Elena Dominguez sent at 1/23/2017  4:25 PM CST -----  Contact: self  Pt is returning a missed call.    Contact number [phone]

## 2017-01-26 NOTE — TELEPHONE ENCOUNTER
Patient called to say she has not received her Revlimid yet informed me they were working on Co pay assistance for her.  Is asking if we need to reschedule her f/u for labs and to see md to coordinate when she starts.  Informed her she would probable still need to do her lasb as scheduled and that we would need to push back her MD follow up based on when she starts her medication.  I informed her that I would discuss with Dr Valdes and call her tomorrow to confirm.  Patient also requested her lab appts to be for 7:15 am when she needs them, also asked if she can do labs day before MD visit and if she could be the last MD appt for the day as she has started a new job and does not want to jeopardize it in anyway,  Reassured her that we could accommodate her request and give her an excuse letter when she comes for her appointments.  Patient was very grateful .

## 2017-01-26 NOTE — TELEPHONE ENCOUNTER
Pt left message on my voicemail stating that she has a new job and she works during the day.  She is unable to come for Diabetes education during the day.  I left her a message advising her to contact her pcp to let her know.  Unfortunately, we are unable to schedule at this time, we keep playing phone tag and both our schedules do not work out.

## 2017-01-27 ENCOUNTER — TELEPHONE (OUTPATIENT)
Dept: PHARMACY | Facility: CLINIC | Age: 67
End: 2017-01-27

## 2017-01-27 NOTE — TELEPHONE ENCOUNTER
FOR DOCUMENTATION ONLY:  Revlimid prior authorization approved  1/26/17 through 7/25/17  $3,356.23 co pay      [employer] [facility] Stu:   $7,500.00 award  7/31/16 through 1/27/18     BIN# [id number]  ID# [id number]  N# [id number]  GROUP# [id number]  [phone]       [facility]   [phone] Fax

## 2017-01-27 NOTE — TELEPHONE ENCOUNTER
Called patient no answering voicemail left asking her to please call back, will continue to follow up.

## 2017-01-30 ENCOUNTER — TELEPHONE (OUTPATIENT)
Dept: HEMATOLOGY/ONCOLOGY | Facility: CLINIC | Age: 67
End: 2017-01-30

## 2017-01-30 DIAGNOSIS — D46.C MDS (MYELODYSPLASTIC SYNDROME) WITH 5Q DELETION: ICD-10-CM

## 2017-01-30 RX ORDER — LENALIDOMIDE 10 MG/1
10 CAPSULE ORAL DAILY
Qty: 30 EACH | Refills: 12 | Status: SHIPPED | OUTPATIENT
Start: 2017-01-30 | End: 2017-03-08 | Stop reason: ALTCHOICE

## 2017-01-30 NOTE — TELEPHONE ENCOUNTER
----- Message from Chikis Cormier sent at 1/30/2017  8:25 AM CST -----  Contact: self  Pt is returning a missed call from (Little).  Contact number [phone]

## 2017-01-31 NOTE — TELEPHONE ENCOUNTER
rx for revlimid faxed to [facility] [phone].  auth # [id number]

## 2017-02-02 ENCOUNTER — DOCUMENTATION ONLY (OUTPATIENT)
Dept: HEMATOLOGY/ONCOLOGY | Facility: CLINIC | Age: 67
End: 2017-02-02

## 2017-02-02 ENCOUNTER — LAB VISIT (OUTPATIENT)
Dept: LAB | Facility: HOSPITAL | Age: 67
End: 2017-02-02
Attending: INTERNAL MEDICINE
Payer: MEDICARE

## 2017-02-02 DIAGNOSIS — D46.C MDS (MYELODYSPLASTIC SYNDROME) WITH 5Q DELETION: Primary | ICD-10-CM

## 2017-02-02 DIAGNOSIS — D46.C MDS (MYELODYSPLASTIC SYNDROME) WITH 5Q DELETION: ICD-10-CM

## 2017-02-02 LAB
ANISOCYTOSIS BLD QL SMEAR: SLIGHT
BASOPHILS # BLD AUTO: 0.09 K/UL
BASOPHILS NFR BLD: 1.7 %
DIFFERENTIAL METHOD: ABNORMAL
EOSINOPHIL # BLD AUTO: 0.2 K/UL
EOSINOPHIL NFR BLD: 4.2 %
ERYTHROCYTE [DISTWIDTH] IN BLOOD BY AUTOMATED COUNT: ABNORMAL %
HCT VFR BLD AUTO: 18.6 %
HGB BLD-MCNC: 6.4 G/DL
LYMPHOCYTES # BLD AUTO: 1.6 K/UL
LYMPHOCYTES NFR BLD: 31.1 %
MCH RBC QN AUTO: 38.6 PG
MCHC RBC AUTO-ENTMCNC: 34.4 %
MCV RBC AUTO: 112 FL
MONOCYTES # BLD AUTO: 0.3 K/UL
MONOCYTES NFR BLD: 5.5 %
NEUTROPHILS # BLD AUTO: 3 K/UL
NEUTROPHILS NFR BLD: 56.9 %
OVALOCYTES BLD QL SMEAR: ABNORMAL
PLATELET # BLD AUTO: 392 K/UL
PLATELET BLD QL SMEAR: ABNORMAL
PMV BLD AUTO: 10.6 FL
POIKILOCYTOSIS BLD QL SMEAR: SLIGHT
POLYCHROMASIA BLD QL SMEAR: ABNORMAL
RBC # BLD AUTO: 1.66 M/UL
WBC # BLD AUTO: 5.24 K/UL

## 2017-02-02 PROCEDURE — 36415 COLL VENOUS BLD VENIPUNCTURE: CPT

## 2017-02-02 PROCEDURE — 85025 COMPLETE CBC W/AUTO DIFF WBC: CPT

## 2017-02-02 PROCEDURE — 86920 COMPATIBILITY TEST SPIN: CPT

## 2017-02-02 RX ORDER — HYDROCODONE BITARTRATE AND ACETAMINOPHEN 500; 5 MG/1; MG/1
TABLET ORAL
Status: DISCONTINUED | OUTPATIENT
Start: 2017-02-03 | End: 2017-02-09

## 2017-02-03 ENCOUNTER — LAB VISIT (OUTPATIENT)
Dept: LAB | Facility: HOSPITAL | Age: 67
End: 2017-02-03
Attending: INTERNAL MEDICINE
Payer: MEDICARE

## 2017-02-03 DIAGNOSIS — D46.C MDS (MYELODYSPLASTIC SYNDROME) WITH 5Q DELETION: ICD-10-CM

## 2017-02-03 LAB
ABO + RH BLD: NORMAL
BLD GP AB SCN CELLS X3 SERPL QL: NORMAL

## 2017-02-03 PROCEDURE — 36415 COLL VENOUS BLD VENIPUNCTURE: CPT

## 2017-02-03 PROCEDURE — 86850 RBC ANTIBODY SCREEN: CPT

## 2017-02-03 PROCEDURE — 86900 BLOOD TYPING SEROLOGIC ABO: CPT

## 2017-02-03 PROCEDURE — 27201040 HC RC 50 FILTER

## 2017-02-04 ENCOUNTER — INFUSION (OUTPATIENT)
Dept: INFUSION THERAPY | Facility: HOSPITAL | Age: 67
End: 2017-02-04
Attending: INTERNAL MEDICINE
Payer: MEDICARE

## 2017-02-04 VITALS
DIASTOLIC BLOOD PRESSURE: 71 MMHG | TEMPERATURE: 98 F | SYSTOLIC BLOOD PRESSURE: 161 MMHG | RESPIRATION RATE: 18 BRPM | HEART RATE: 71 BPM

## 2017-02-04 DIAGNOSIS — D53.9 MACROCYTIC ANEMIA: ICD-10-CM

## 2017-02-04 LAB
BLD PROD TYP BPU: NORMAL
BLOOD UNIT EXPIRATION DATE: NORMAL
BLOOD UNIT TYPE CODE: 6200
BLOOD UNIT TYPE: NORMAL
CODING SYSTEM: NORMAL
DISPENSE STATUS: NORMAL
NUM UNITS TRANS PACKED RBC: NORMAL

## 2017-02-04 PROCEDURE — 36430 TRANSFUSION BLD/BLD COMPNT: CPT

## 2017-02-04 PROCEDURE — P9038 RBC IRRADIATED: HCPCS

## 2017-02-04 PROCEDURE — 27201040 HC RC 50 FILTER

## 2017-02-04 PROCEDURE — 25000003 PHARM REV CODE 250: Performed by: INTERNAL MEDICINE

## 2017-02-04 PROCEDURE — 86920 COMPATIBILITY TEST SPIN: CPT

## 2017-02-04 RX ORDER — HYDROCODONE BITARTRATE AND ACETAMINOPHEN 500; 5 MG/1; MG/1
TABLET ORAL ONCE
Status: DISCONTINUED | OUTPATIENT
Start: 2017-02-04 | End: 2017-02-04 | Stop reason: HOSPADM

## 2017-02-04 RX ORDER — ACETAMINOPHEN 325 MG/1
650 TABLET ORAL
Status: COMPLETED | OUTPATIENT
Start: 2017-02-04 | End: 2017-02-04

## 2017-02-04 RX ORDER — DIPHENHYDRAMINE HCL 25 MG
25 CAPSULE ORAL
Status: COMPLETED | OUTPATIENT
Start: 2017-02-04 | End: 2017-02-04

## 2017-02-04 RX ADMIN — DIPHENHYDRAMINE HYDROCHLORIDE 25 MG: 25 CAPSULE ORAL at 08:02

## 2017-02-04 RX ADMIN — ACETAMINOPHEN 650 MG: 325 TABLET ORAL at 08:02

## 2017-02-04 NOTE — PLAN OF CARE
Problem: Patient Care Overview (Adult)  Goal: Plan of Care Review  Outcome: Ongoing (interventions implemented as appropriate)  1102-Patient tolerated transfusion well. Discharged without complaints or S/S of adverse event. AVS given.  Instructed to call provider for any questions or concerns.

## 2017-02-04 NOTE — PLAN OF CARE
Problem: Patient Care Overview (Adult)  Goal: Individualization & Mutuality  Outcome: Ongoing (interventions implemented as appropriate)  0815- Patient arrived ambulatory to the unit for 1 unit blood transfusion. Labs , hx, and medications reviewed. Assessment completed. Discussed plan of care with patient. Patient in agreement. Chair reclined and warm blanket and snack offered.

## 2017-02-04 NOTE — MR AVS SNAPSHOT
Patient Information     Patient Name Sex Susan Cheung Female 1950      Visit Information        Provider Department Dept Phone Center    2017 8:15 AM INJECTION, [facility] [phone] [facility]      Patient Instructions     None      Your Current Medications Are     acetaminophen (TYLENOL ARTHRITIS PAIN) 650 MG TbSR    aspirin (ECOTRIN) 81 MG EC tablet    b complex vitamins capsule    fexofenadine (ALLEGRA) 180 MG tablet    fish oil-omega-3 fatty acids 300-1,000 mg capsule    lenalidomide (REVLIMID) 10 mg Cap    lisinopril-hydrochlorothiazide (PRINZIDE,ZESTORETIC) 20-12.5 mg per tablet    metformin (GLUCOPHAGE) 500 MG tablet    nicotine (NICODERM CQ) 21 mg/24 hr    nicotine (NICOTROL) 10 mg Crtg      Facility-Administered Medications     0.9%  NaCl infusion (for blood administration)    0.9%  NaCl infusion (for blood administration)    acetaminophen tablet 650 mg    acetaminophen tablet 650 mg    diphenhydrAMINE capsule 25 mg    diphenhydrAMINE capsule 25 mg      Appointments for Next Year     2017 10:00 AM NON FASTING LAB (15 min.) [facility] LAB, [facility]EM[facility] SAME DAY    Arrive at check-in approximately 15 minutes before your scheduled appointment time. Bring all outside medical records and imaging, along with a list of your current medications and insurance card.    [facility] 3rd Floor    2017 11:30 AM EDUCATION (60 min.) [facility]  Diabetes Program DIABETES EDUCATOR, INT MED 2    Arrive at check-in approximately 15 minutes before your scheduled appointment time. Bring all outside medical records and imaging, along with a list of your current medications and insurance card.    [facility] Bld[facility]    2017  8:00 AM NON FASTING LAB (15 min.) [facility] LAB, [facility]EM[facility] SAME DAY    Arrive at check-in approximately 15 minutes before your scheduled appointment time. Bring all  outside medical records and imaging, along with a list of your current medications and insurance card.    [facility] 3rd Floor    2/16/2017  9:00 AM ESTABLISHED PATIENT (30 min.) [facility] Gita Valdes MD    Arrive at check-in approximately 15 minutes before your scheduled appointment time. Bring all outside medical records and imaging, along with a list of your current medications and insurance card.    [facility]         Default Flowsheet Data (last 24 hours)      Amb Complex Vitals Pj        02/04/17 1055 02/04/17 0857 02/04/17 0842 02/04/17 0825       Measurements    BP (!)  161/71 (!)  140/77 123/60 123/60     Temp 98.3 °F (36.8 °C) 97.9 °F (36.6 °C) 97.9 °F (36.6 °C) 97.9 °F (36.6 °C)     Pulse  89 93 93     Resp 18 18 18 18             Allergies     No Known Allergies      Medications You Received from 02/03/2017 1059 to 02/04/2017 1059        Date/Time Order Dose Route Action     02/04/2017 0821 acetaminophen tablet 650 mg 650 mg Oral Given     02/04/2017 0821 diphenhydrAMINE capsule 25 mg 25 mg Oral Given      Current Discharge Medication List     Cannot display discharge medications since this is not an admission.

## 2017-02-05 PROCEDURE — 86644 CMV ANTIBODY: CPT

## 2017-02-07 ENCOUNTER — TELEPHONE (OUTPATIENT)
Dept: SMOKING CESSATION | Facility: CLINIC | Age: 67
End: 2017-02-07

## 2017-02-07 ENCOUNTER — TELEPHONE (OUTPATIENT)
Dept: HEMATOLOGY/ONCOLOGY | Facility: CLINIC | Age: 67
End: 2017-02-07

## 2017-02-07 NOTE — TELEPHONE ENCOUNTER
2/7/17   2:30 pm    Telephone call to patient to inform that Smoking Cessation Group was cancelled this evening due to bad weather and the first group session will occur next Tuesday, 2/14/17.

## 2017-02-07 NOTE — TELEPHONE ENCOUNTER
----- Message from Fabiola Massey sent at 2/7/2017  2:35 PM CST -----  [facility] would like the nurse to give [employer]'s a call at [phone] about the patient's Revlimid medication. [employer] can be reached at [phone] option 1 then option 5 if the doctor needs to speak with them.   Spoke with [employer]. Authorization # can be used now.

## 2017-02-08 LAB
BLD PROD TYP BPU: NORMAL
BLOOD UNIT EXPIRATION DATE: NORMAL
BLOOD UNIT TYPE CODE: 600
BLOOD UNIT TYPE: NORMAL
CODING SYSTEM: NORMAL
DISPENSE STATUS: NORMAL
NUM UNITS TRANS PACKED RBC: NORMAL

## 2017-02-09 ENCOUNTER — TELEPHONE (OUTPATIENT)
Dept: HEMATOLOGY/ONCOLOGY | Facility: CLINIC | Age: 67
End: 2017-02-09

## 2017-02-09 ENCOUNTER — LAB VISIT (OUTPATIENT)
Dept: LAB | Facility: HOSPITAL | Age: 67
End: 2017-02-09
Attending: INTERNAL MEDICINE
Payer: MEDICARE

## 2017-02-09 DIAGNOSIS — D46.C MDS (MYELODYSPLASTIC SYNDROME) WITH 5Q DELETION: Primary | ICD-10-CM

## 2017-02-09 DIAGNOSIS — D46.C MDS (MYELODYSPLASTIC SYNDROME) WITH 5Q DELETION: ICD-10-CM

## 2017-02-09 LAB
ABO + RH BLD: NORMAL
BASOPHILS # BLD AUTO: 0.08 K/UL
BASOPHILS NFR BLD: 1.4 %
BLD GP AB SCN CELLS X3 SERPL QL: NORMAL
DIFFERENTIAL METHOD: ABNORMAL
EOSINOPHIL # BLD AUTO: 0.3 K/UL
EOSINOPHIL NFR BLD: 4.8 %
ERYTHROCYTE [DISTWIDTH] IN BLOOD BY AUTOMATED COUNT: ABNORMAL %
HCT VFR BLD AUTO: 19.9 %
HGB BLD-MCNC: 6.7 G/DL
LYMPHOCYTES # BLD AUTO: 1.6 K/UL
LYMPHOCYTES NFR BLD: 27.8 %
MCH RBC QN AUTO: 36 PG
MCHC RBC AUTO-ENTMCNC: 33.8 %
MCV RBC AUTO: 107 FL
MONOCYTES # BLD AUTO: 0.2 K/UL
MONOCYTES NFR BLD: 3.4 %
NEUTROPHILS # BLD AUTO: 3.7 K/UL
NEUTROPHILS NFR BLD: 62.6 %
PLATELET # BLD AUTO: 343 K/UL
PMV BLD AUTO: 10.8 FL
RBC # BLD AUTO: 1.86 M/UL
WBC # BLD AUTO: 5.89 K/UL

## 2017-02-09 PROCEDURE — 27201040 HC RC 50 FILTER

## 2017-02-09 PROCEDURE — 85025 COMPLETE CBC W/AUTO DIFF WBC: CPT

## 2017-02-09 PROCEDURE — 86920 COMPATIBILITY TEST SPIN: CPT

## 2017-02-09 PROCEDURE — 86901 BLOOD TYPING SEROLOGIC RH(D): CPT

## 2017-02-09 PROCEDURE — 86900 BLOOD TYPING SEROLOGIC ABO: CPT

## 2017-02-09 PROCEDURE — 36415 COLL VENOUS BLD VENIPUNCTURE: CPT

## 2017-02-09 RX ORDER — HYDROCODONE BITARTRATE AND ACETAMINOPHEN 500; 5 MG/1; MG/1
TABLET ORAL
Status: DISCONTINUED | OUTPATIENT
Start: 2017-02-09 | End: 2017-03-09

## 2017-02-10 ENCOUNTER — INFUSION (OUTPATIENT)
Dept: INFECTIOUS DISEASES | Facility: HOSPITAL | Age: 67
End: 2017-02-10
Attending: INTERNAL MEDICINE
Payer: MEDICARE

## 2017-02-10 ENCOUNTER — NURSE TRIAGE (OUTPATIENT)
Dept: ADMINISTRATIVE | Facility: CLINIC | Age: 67
End: 2017-02-10

## 2017-02-10 VITALS
BODY MASS INDEX: 30.49 KG/M2 | DIASTOLIC BLOOD PRESSURE: 58 MMHG | WEIGHT: 183 LBS | HEART RATE: 85 BPM | SYSTOLIC BLOOD PRESSURE: 110 MMHG | TEMPERATURE: 98 F | HEIGHT: 65 IN

## 2017-02-10 DIAGNOSIS — D64.9 ANEMIA, UNSPECIFIED TYPE: Primary | ICD-10-CM

## 2017-02-10 LAB
BLD PROD TYP BPU: NORMAL
BLD PROD TYP BPU: NORMAL
BLOOD UNIT EXPIRATION DATE: NORMAL
BLOOD UNIT EXPIRATION DATE: NORMAL
BLOOD UNIT TYPE CODE: 6200
BLOOD UNIT TYPE CODE: 6200
BLOOD UNIT TYPE: NORMAL
BLOOD UNIT TYPE: NORMAL
CODING SYSTEM: NORMAL
CODING SYSTEM: NORMAL
DISPENSE STATUS: NORMAL
DISPENSE STATUS: NORMAL
NUM UNITS TRANS PACKED RBC: NORMAL
NUM UNITS TRANS PACKED RBC: NORMAL

## 2017-02-10 PROCEDURE — P9038 RBC IRRADIATED: HCPCS

## 2017-02-10 PROCEDURE — 36430 TRANSFUSION BLD/BLD COMPNT: CPT

## 2017-02-10 NOTE — PLAN OF CARE
Problem: Patient Care Overview (Adult)  Goal: Individualization & Mutuality  Outcome: Ongoing (interventions implemented as appropriate)  Pt educated on proper hand hygiene and infection risks. Pt verbalized understanding.

## 2017-02-10 NOTE — TELEPHONE ENCOUNTER
"    Reason for Disposition   Caller has URGENT medication question about med that PCP prescribed and triager unable to answer question    Protocols used: ST MEDICATION QUESTION CALL-A-    Patient called with concerns about Revlamid infusions and reports that she has "itching all over". Call transferred to Dr. Valdes's office for assistance.   "

## 2017-02-11 PROCEDURE — 86644 CMV ANTIBODY: CPT

## 2017-02-14 ENCOUNTER — TELEPHONE (OUTPATIENT)
Dept: HEMATOLOGY/ONCOLOGY | Facility: CLINIC | Age: 67
End: 2017-02-14

## 2017-02-14 NOTE — TELEPHONE ENCOUNTER
Returned call and spoke wiht patient,   Woke up this  Am and she states her face was swollen , developed rash last Thursday night..  Describes rash as burning and itching  Is not experiencing shortness of breath or swelling of the tongue.  Informed patient i would discuss with nurse practitioner Soumya and call her back  Patient agreeable to the above.

## 2017-02-14 NOTE — TELEPHONE ENCOUNTER
----- Message from Marzena Pérez sent at 2/14/2017 10:30 AM CST -----  Contact: Pt  Pt states she is currently taking Revlimid she was told to report any side effects that she may experience, on today pts left side of her face is red and swollen, also she reported on Friday that she has a skin rash as well    Pt contact number [phone]  Thanks

## 2017-02-15 ENCOUNTER — CLINICAL SUPPORT (OUTPATIENT)
Dept: SMOKING CESSATION | Facility: CLINIC | Age: 67
End: 2017-02-15
Payer: MEDICARE

## 2017-02-15 DIAGNOSIS — F17.210 CIGARETTE SMOKER: Primary | ICD-10-CM

## 2017-02-15 PROCEDURE — 99407 BEHAV CHNG SMOKING > 10 MIN: CPT | Mod: S$GLB,,,

## 2017-02-16 ENCOUNTER — OFFICE VISIT (OUTPATIENT)
Dept: HEMATOLOGY/ONCOLOGY | Facility: CLINIC | Age: 67
End: 2017-02-16
Payer: MEDICARE

## 2017-02-16 ENCOUNTER — LAB VISIT (OUTPATIENT)
Dept: LAB | Facility: HOSPITAL | Age: 67
End: 2017-02-16
Attending: INTERNAL MEDICINE
Payer: MEDICARE

## 2017-02-16 VITALS — DIASTOLIC BLOOD PRESSURE: 67 MMHG | TEMPERATURE: 98 F | SYSTOLIC BLOOD PRESSURE: 141 MMHG | HEART RATE: 95 BPM

## 2017-02-16 DIAGNOSIS — L23.9 ALLERGIC DERMATITIS: ICD-10-CM

## 2017-02-16 DIAGNOSIS — D64.9 ANEMIA REQUIRING TRANSFUSIONS: ICD-10-CM

## 2017-02-16 DIAGNOSIS — D46.C MDS (MYELODYSPLASTIC SYNDROME) WITH 5Q DELETION: Primary | ICD-10-CM

## 2017-02-16 DIAGNOSIS — D46.C MDS (MYELODYSPLASTIC SYNDROME) WITH 5Q DELETION: ICD-10-CM

## 2017-02-16 LAB
ABO + RH BLD: NORMAL
ALBUMIN SERPL BCP-MCNC: 3.2 G/DL
ALP SERPL-CCNC: 65 U/L
ALT SERPL W/O P-5'-P-CCNC: 16 U/L
ANION GAP SERPL CALC-SCNC: 10 MMOL/L
AST SERPL-CCNC: 13 U/L
BASOPHILS # BLD AUTO: 0.04 K/UL
BASOPHILS NFR BLD: 0.9 %
BILIRUB SERPL-MCNC: 0.3 MG/DL
BLD GP AB SCN CELLS X3 SERPL QL: NORMAL
BUN SERPL-MCNC: 14 MG/DL
CALCIUM SERPL-MCNC: 10.6 MG/DL
CHLORIDE SERPL-SCNC: 100 MMOL/L
CO2 SERPL-SCNC: 27 MMOL/L
CREAT SERPL-MCNC: 1 MG/DL
DIFFERENTIAL METHOD: ABNORMAL
EOSINOPHIL # BLD AUTO: 0.5 K/UL
EOSINOPHIL NFR BLD: 10.7 %
ERYTHROCYTE [DISTWIDTH] IN BLOOD BY AUTOMATED COUNT: 25.5 %
EST. GFR  (AFRICAN AMERICAN): >60 ML/MIN/1.73 M^2
EST. GFR  (NON AFRICAN AMERICAN): 58.8 ML/MIN/1.73 M^2
GLUCOSE SERPL-MCNC: 108 MG/DL
HCT VFR BLD AUTO: 26.5 %
HGB BLD-MCNC: 9.2 G/DL
LYMPHOCYTES # BLD AUTO: 0.9 K/UL
LYMPHOCYTES NFR BLD: 20.5 %
MCH RBC QN AUTO: 34.7 PG
MCHC RBC AUTO-ENTMCNC: 34.7 %
MCV RBC AUTO: 100 FL
MONOCYTES # BLD AUTO: 0.3 K/UL
MONOCYTES NFR BLD: 7 %
NEUTROPHILS # BLD AUTO: 2.8 K/UL
NEUTROPHILS NFR BLD: 60.5 %
PLATELET # BLD AUTO: 191 K/UL
PMV BLD AUTO: 12.8 FL
POTASSIUM SERPL-SCNC: 3.4 MMOL/L
PROT SERPL-MCNC: 6.7 G/DL
RBC # BLD AUTO: 2.65 M/UL
SODIUM SERPL-SCNC: 137 MMOL/L
WBC # BLD AUTO: 4.59 K/UL

## 2017-02-16 PROCEDURE — 3078F DIAST BP <80 MM HG: CPT | Mod: S$GLB,,, | Performed by: INTERNAL MEDICINE

## 2017-02-16 PROCEDURE — 1160F RVW MEDS BY RX/DR IN RCRD: CPT | Mod: S$GLB,,, | Performed by: INTERNAL MEDICINE

## 2017-02-16 PROCEDURE — 36415 COLL VENOUS BLD VENIPUNCTURE: CPT

## 2017-02-16 PROCEDURE — 86850 RBC ANTIBODY SCREEN: CPT

## 2017-02-16 PROCEDURE — 1159F MED LIST DOCD IN RCRD: CPT | Mod: S$GLB,,, | Performed by: INTERNAL MEDICINE

## 2017-02-16 PROCEDURE — 80053 COMPREHEN METABOLIC PANEL: CPT

## 2017-02-16 PROCEDURE — 86900 BLOOD TYPING SEROLOGIC ABO: CPT

## 2017-02-16 PROCEDURE — 1157F ADVNC CARE PLAN IN RCRD: CPT | Mod: S$GLB,,, | Performed by: INTERNAL MEDICINE

## 2017-02-16 PROCEDURE — 99999 PR PBB SHADOW E&M-EST. PATIENT-LVL III: CPT | Mod: PBBFAC,,, | Performed by: INTERNAL MEDICINE

## 2017-02-16 PROCEDURE — 99215 OFFICE O/P EST HI 40 MIN: CPT | Mod: S$GLB,,, | Performed by: INTERNAL MEDICINE

## 2017-02-16 PROCEDURE — 3077F SYST BP >= 140 MM HG: CPT | Mod: S$GLB,,, | Performed by: INTERNAL MEDICINE

## 2017-02-16 PROCEDURE — 85025 COMPLETE CBC W/AUTO DIFF WBC: CPT

## 2017-02-16 RX ORDER — LENALIDOMIDE 2.5 MG/1
2.5 CAPSULE ORAL DAILY
Qty: 30 CAPSULE | Refills: 0 | Status: SHIPPED | OUTPATIENT
Start: 2017-02-16 | End: 2017-03-08 | Stop reason: ALTCHOICE

## 2017-02-16 NOTE — PROGRESS NOTES
Subjective:       Patient ID: Susan Puente is a 66 y.o. female.    Chief Complaint: No chief complaint on file.    HPI Comments: Interval Hx:  Patient presents today for follow up of her MDS 5 q del syndrome. Patient had a delay in obtaining revlimid 10 mg daily but did start taking the medication 2/8/17. On 2/9/17 patient developed a diffuse maculopapular rash throughout scalp arms, legs and torso. Patient had applied steroid cream and recently called to report rash. She began benadryl yesterday and discontinued treatment per instruction. She states she has had no stridor or wheezing. Did begin to experience diarrhea. No nausea or vomiting. Since last visit, patient has received 4 units of PRBC. Hgb today 9.2.     History   Ms. Puente is a 66 year old female with hx of DM2, peripheral vascular disease, tobacco use, CAD, hyperlipidemia, hypertension who was hospitalized 11/10/16 for anemia. hgb 5.3  MCH 43.4 with normal WBC and platelets. Patient had normal iron stores. She was transfused 3 units of PRBCs and discharged home with hgb 8.7 on 11/11/16. She was referred for further evalutation of her anemia. On 12/16/16 patient had a normal SPEP and immunofixation. Slightly elevated kappa light chains with normal ration. Elevated vitamin b12, normal folate, JAYDEN was positive with a low titer and negative profile. Patient had a bone marrow biopsy 1/5/16 which showed the core biopsy is normocellular for age (40%); however, megakaryocytes are increased and show  frequent small, hypolobated forms. Additionally, a subset of the neutrophils are hypogranular. Blasts are not increased by either morphology (1.2%) or in the corresponding flow cytometric analysis. Fish detects a 5q deletion in 54.5% of nuclei. Cytogenetics reported 20 metaphases, 2 metaphases were normal and 18 metaphases had a 5q deletion. No additional cytogenetic abnormalities were detected. Findings consistent with 5q deletion  syndrome.      Review of Systems   Constitutional: Negative for diaphoresis, fatigue and fever.   HENT: Negative for mouth sores, tinnitus and trouble swallowing.    Eyes: Negative for photophobia and visual disturbance.   Respiratory: Negative for cough, choking and shortness of breath.    Cardiovascular: Negative for chest pain and leg swelling.   Gastrointestinal: Negative for abdominal distention and abdominal pain.   Endocrine: Negative for polyphagia and polyuria.   Genitourinary: Negative for dysuria and hematuria.   Musculoskeletal: Negative for joint swelling and myalgias.   Skin: Positive for rash.   Neurological: Negative for seizures and light-headedness.   Hematological: Negative for adenopathy. Does not bruise/bleed easily.   Psychiatric/Behavioral: Negative for agitation and behavioral problems.       Objective:      Physical Exam   Constitutional: She is oriented to person, place, and time. She appears well-developed and well-nourished.   HENT:   Head: Normocephalic.   Mouth/Throat: Oropharynx is clear and moist. No oropharyngeal exudate.   Eyes: Conjunctivae are normal. Pupils are equal, round, and reactive to light. No scleral icterus.   Neck: Normal range of motion.   Cardiovascular: Normal rate and regular rhythm.    Pulmonary/Chest: Effort normal and breath sounds normal.   Abdominal: Soft. Bowel sounds are normal. She exhibits no distension.   Musculoskeletal: She exhibits no edema or tenderness.   Lymphadenopathy:     She has no cervical adenopathy.   Neurological: She is alert and oriented to person, place, and time. No cranial nerve deficit.   Skin: Rash noted.   Maculopapular  - scalp, arms, legs, torso    Psychiatric: She has a normal mood and affect. Her behavior is normal.       Assessment:       1. MDS (myelodysplastic syndrome) with 5q deletion    2. Allergic dermatitis        Plan:   Mrs. Puente is a 66 year old female with MDS 5q del. Unfortunately she has developed a significant  moderate rash second to revlimid and this has been held. Will refer to allergy for possible desensitization and may plant to restart revlimid at lower dose of 2.5 mg daily once rash resolves. In the interim will plan on weekly procrit to address anemia. Will RTC in 4 weeks or sooner as needed. Patient to report skin rash status in the interim. Will follow up allergies recommendations. Patient to take imodium prn diarrhea. All questions answered to satisfaction.  Case discussed with Dr. Lucio Contreras MD   Pager [phone]

## 2017-02-16 NOTE — Clinical Note
Patient needs weekly labs (cbc) she will also need weekly procrit. To follow up in 4 weeks with cbc and cmp to see me and andreea

## 2017-02-17 ENCOUNTER — PATIENT MESSAGE (OUTPATIENT)
Dept: DIABETES | Facility: CLINIC | Age: 67
End: 2017-02-17

## 2017-02-21 ENCOUNTER — CLINICAL SUPPORT (OUTPATIENT)
Dept: SMOKING CESSATION | Facility: CLINIC | Age: 67
End: 2017-02-21
Payer: COMMERCIAL

## 2017-02-21 DIAGNOSIS — F17.200 SMOKES AND MOTIVATED TO QUIT: Primary | ICD-10-CM

## 2017-02-21 DIAGNOSIS — D46.C MDS (MYELODYSPLASTIC SYNDROME) WITH 5Q DELETION: Primary | ICD-10-CM

## 2017-02-21 PROCEDURE — 90853 GROUP PSYCHOTHERAPY: CPT | Mod: S$GLB,,,

## 2017-02-22 NOTE — PROGRESS NOTES
Smoking Cessation Group Session #2    Site: [facility]  Date:  2/21/17  Clinical Status of Patient: Outpatient   Length of Service and Code: 90 minutes - 36309   Number in Attendance: 5  Group Activities/Focus of Group:  Sharing last weeks challenges, triggers, and coping activities to remain quit and/ or keep making progress toward cessation, completion of TCRS (Tobacco Cessation Rating Scale) learned addiction model, personal reasons for quitting, medications, goals, quit date.  Specific session focus: Dependence, withdrawal medications, effects of nicotine on the body, toxins in cigarettes, health risks of smoking, health improvements with quitting, states of mind and effect on urges.  Moving through urges without fighting them or smoking by mindful awareness.  Target symptoms:  withdrawal and medication side effects             The following were rated moderate (3) to severe (4) on TCRS:       Moderate 3: anxiety, nervousness, insomnia -Reported she is not sure to attribute this to quitting smoking or using nicotine patch because she has had side effects from other medications so frequently lately.     Severe 4:   Urges/cravings  Patient's Response to Intervention: Active participation, self-disclosure, supportive of group peers.  Progress Toward Goals and Other Mental Status Changes:   Patient still smoking 1/2 pack of cigarettes/day, cut down by 10 cigarettes.  She shared with the group the new diagnosis of cancer she was given, that she was even more stressed because of all the allergic reactions she has had and this is negatively affecting her quit process.  She shared with the group that due to the type of cancer and chemotherapy she is taking there are some days when she feels extremely fatigued.  She found the information on urge surfing to be especially helpful to her because there are often times when she would not have the energy to keep herself distracted.  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 Medication management by providers.  Return to Clinic: 1 week  Planned Quit Date:  To be determined

## 2017-02-23 ENCOUNTER — LAB VISIT (OUTPATIENT)
Dept: LAB | Facility: HOSPITAL | Age: 67
End: 2017-02-23
Payer: MEDICARE

## 2017-02-23 ENCOUNTER — PATIENT MESSAGE (OUTPATIENT)
Dept: HEMATOLOGY/ONCOLOGY | Facility: CLINIC | Age: 67
End: 2017-02-23

## 2017-02-23 DIAGNOSIS — R53.81 DEBILITY: Primary | ICD-10-CM

## 2017-02-23 DIAGNOSIS — D46.C MDS (MYELODYSPLASTIC SYNDROME) WITH 5Q DELETION: ICD-10-CM

## 2017-02-23 LAB
ABO + RH BLD: NORMAL
BASOPHILS # BLD AUTO: 0.06 K/UL
BASOPHILS NFR BLD: 1.6 %
BLD GP AB SCN CELLS X3 SERPL QL: NORMAL
DIFFERENTIAL METHOD: ABNORMAL
EOSINOPHIL # BLD AUTO: 0.5 K/UL
EOSINOPHIL NFR BLD: 13.5 %
ERYTHROCYTE [DISTWIDTH] IN BLOOD BY AUTOMATED COUNT: 25.3 %
HCT VFR BLD AUTO: 23 %
HGB BLD-MCNC: 7.6 G/DL
LYMPHOCYTES # BLD AUTO: 1.7 K/UL
LYMPHOCYTES NFR BLD: 45.4 %
MCH RBC QN AUTO: 33.9 PG
MCHC RBC AUTO-ENTMCNC: 33 %
MCV RBC AUTO: 103 FL
MONOCYTES # BLD AUTO: 0.2 K/UL
MONOCYTES NFR BLD: 6.5 %
NEUTROPHILS # BLD AUTO: 1.2 K/UL
NEUTROPHILS NFR BLD: 31.6 %
PLATELET # BLD AUTO: 170 K/UL
PMV BLD AUTO: 11.1 FL
RBC # BLD AUTO: 2.24 M/UL
WBC # BLD AUTO: 3.7 K/UL

## 2017-02-23 PROCEDURE — 85025 COMPLETE CBC W/AUTO DIFF WBC: CPT

## 2017-02-23 PROCEDURE — 86900 BLOOD TYPING SEROLOGIC ABO: CPT

## 2017-02-23 PROCEDURE — 86901 BLOOD TYPING SEROLOGIC RH(D): CPT

## 2017-02-23 PROCEDURE — 82668 ASSAY OF ERYTHROPOIETIN: CPT

## 2017-02-24 LAB — ERYTHROPOIETIN: ABNORMAL MIU/ML

## 2017-03-02 ENCOUNTER — LAB VISIT (OUTPATIENT)
Dept: LAB | Facility: HOSPITAL | Age: 67
End: 2017-03-02
Payer: MEDICARE

## 2017-03-02 DIAGNOSIS — D46.C MDS (MYELODYSPLASTIC SYNDROME) WITH 5Q DELETION: ICD-10-CM

## 2017-03-02 LAB
ABO + RH BLD: NORMAL
ANISOCYTOSIS BLD QL SMEAR: SLIGHT
BASOPHILS # BLD AUTO: 0.12 K/UL
BASOPHILS NFR BLD: 3 %
BLD GP AB SCN CELLS X3 SERPL QL: NORMAL
DIFFERENTIAL METHOD: ABNORMAL
EOSINOPHIL # BLD AUTO: 0.4 K/UL
EOSINOPHIL NFR BLD: 9.1 %
ERYTHROCYTE [DISTWIDTH] IN BLOOD BY AUTOMATED COUNT: ABNORMAL %
HCT VFR BLD AUTO: 20.9 %
HGB BLD-MCNC: 7.1 G/DL
HYPOCHROMIA BLD QL SMEAR: ABNORMAL
LYMPHOCYTES # BLD AUTO: 1.8 K/UL
LYMPHOCYTES NFR BLD: 44.8 %
MCH RBC QN AUTO: 35 PG
MCHC RBC AUTO-ENTMCNC: 34 %
MCV RBC AUTO: 103 FL
MONOCYTES # BLD AUTO: 0.2 K/UL
MONOCYTES NFR BLD: 4.5 %
NEUTROPHILS # BLD AUTO: 1.5 K/UL
NEUTROPHILS NFR BLD: 38.6 %
OVALOCYTES BLD QL SMEAR: ABNORMAL
PLATELET # BLD AUTO: 182 K/UL
PMV BLD AUTO: 10.9 FL
POIKILOCYTOSIS BLD QL SMEAR: SLIGHT
POLYCHROMASIA BLD QL SMEAR: ABNORMAL
RBC # BLD AUTO: 2.03 M/UL
WBC # BLD AUTO: 3.97 K/UL

## 2017-03-02 PROCEDURE — 85025 COMPLETE CBC W/AUTO DIFF WBC: CPT

## 2017-03-02 PROCEDURE — 36415 COLL VENOUS BLD VENIPUNCTURE: CPT

## 2017-03-02 PROCEDURE — 86900 BLOOD TYPING SEROLOGIC ABO: CPT

## 2017-03-02 PROCEDURE — 86850 RBC ANTIBODY SCREEN: CPT

## 2017-03-07 ENCOUNTER — OFFICE VISIT (OUTPATIENT)
Dept: ALLERGY | Facility: CLINIC | Age: 67
End: 2017-03-07
Payer: MEDICARE

## 2017-03-07 ENCOUNTER — CLINICAL SUPPORT (OUTPATIENT)
Dept: SMOKING CESSATION | Facility: CLINIC | Age: 67
End: 2017-03-07
Payer: COMMERCIAL

## 2017-03-07 DIAGNOSIS — Z88.9 DRUG HYPERSENSITIVITY: Primary | ICD-10-CM

## 2017-03-07 DIAGNOSIS — F17.200 SMOKES AND MOTIVATED TO QUIT: Primary | ICD-10-CM

## 2017-03-07 PROCEDURE — 99407 BEHAV CHNG SMOKING > 10 MIN: CPT | Mod: S$GLB,,,

## 2017-03-07 PROCEDURE — 1157F ADVNC CARE PLAN IN RCRD: CPT | Mod: GC,S$GLB,, | Performed by: ALLERGY & IMMUNOLOGY

## 2017-03-07 PROCEDURE — 3078F DIAST BP <80 MM HG: CPT | Mod: GC,S$GLB,, | Performed by: ALLERGY & IMMUNOLOGY

## 2017-03-07 PROCEDURE — 99999 PR PBB SHADOW E&M-EST. PATIENT-LVL III: CPT | Mod: PBBFAC,GC,, | Performed by: ALLERGY & IMMUNOLOGY

## 2017-03-07 PROCEDURE — 1160F RVW MEDS BY RX/DR IN RCRD: CPT | Mod: GC,S$GLB,, | Performed by: ALLERGY & IMMUNOLOGY

## 2017-03-07 PROCEDURE — 3074F SYST BP LT 130 MM HG: CPT | Mod: GC,S$GLB,, | Performed by: ALLERGY & IMMUNOLOGY

## 2017-03-07 PROCEDURE — 1159F MED LIST DOCD IN RCRD: CPT | Mod: GC,S$GLB,, | Performed by: ALLERGY & IMMUNOLOGY

## 2017-03-07 PROCEDURE — 99204 OFFICE O/P NEW MOD 45 MIN: CPT | Mod: GC,S$GLB,, | Performed by: ALLERGY & IMMUNOLOGY

## 2017-03-07 NOTE — PROGRESS NOTES
ALLERGY & IMMUNOLOGY CLINIC - INITIAL CONSULTATION      HISTORY OF PRESENT ILLNESS     Patient ID: Susan Puente is a 66 y.o. female    CC: patient requests desensitization to revlimid    HPI: 67 yo woman with recently diagnosed MDS 5q del presents for allergy evaluation after drug-related rash.     Revlimid is a once daily pill.     She started revlimid on 2/7. About 30 hours after the first dose, and 6 hours after the second dose, she started to develop an itchy rash on her scalp, which spread to her neck, chest, trunk, and extremities. She took a total of 7 days of the revlimid before stopping. The rash has significantly improved after stopping it. She has one minor patch on her lower back and two small areas underneath her breasts.     She did not experience any sensation of throat closure, fevers, aches, pains, joint swelling, vomiting, diarrhea, shortness of breath or wheezing.     She recently quit smoking.     REVIEW OF SYSTEMS     CONST: no F/C/NS, no unintentional weight changes  NEURO: no H/A, no weakness, no paresthesias  NOSE: intermittent congestion and rhinorrhea  PULM: + SOB with anemia- none recently, no wheezing  CV: no CP, no palpitations, no leg swelling  GI: no dysphagia, no heartburn, no pain, no N/V/D  MSK: no joint pain, no muscle pain  DERM: see HPI     MEDICAL HISTORY     MedHx: active problems reviewed  Reviewed in epic.      PHYSICAL EXAM     VS: There were no vitals taken for this visit.  GENERAL: AAOx4, NAD, well-appearing, cooperative  EYES: PERRL, EOMI, no conjunctival injection, no discharge, no infraorbital shiners  EARS: external auditory canals normal B/L, TM normal B/L  NOSE: NT 3+ and pale pink B/L, scant stringing mucous, no polyps  ORAL: MMM, no ulcers, no thrush, no cobblestoning  NECK: supple, trachea midline, no thyromegaly, no LAD  LUNGS: CTAB, no w/r/c, no increased WOB  HEART: RRR, normal S1/S2, no m/g/r  EXTREMITIES: +2 distal pulses, no c/c/e  LYMPHATICS: no  cervical/submandibular LAD  DERM: patch of pink papules on lower back  NEURO: normal gait, no facial asymmetry     LABORATORY STUDIES     Recent abs eo count up to 400. No evidence of recent liver or kidney dysfunction after taking revlimid.     CHART REVIEW     I discussed the case with Dr. Ramires, oncology fellow, and reviewed her previous notes.     ASSESSMENT & PLAN     Susan Puente is a 66 y.o. 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 No evidence of DRESS syndrome. This hypersensitivity is due to T-cells, and unfortunately, this is not something we can desensitize. It is not a life-threatening reaction, and it will not progress to a life-threatening reaction.     Two options moving forward are:     1. Switch to another medication  2. Symptomatically treat through the itching/rash caused by revlimid with steroids +/- antihistamines. Antihistamines may or may not help. Steroids will certainly help.  I reviewed the maximum dose of antihistamines with Ms. Puente (cetirizine up to 40 mg daily, will cause drowsiness), and told her that if this dose does not help, then she should stop antihistamines altogether and focus on steroids. There is no established dosing regimen of steroids; this will certainly require trial and error to maximize relief of symptoms. If this is the option chosen, I recommend starting with prednisone 10 mg daily, and titrate up or down from there. Because it takes 6 hours for an oral steroid to start working, I recommend starting the steroid at least 6 hours before taking revlimid.     Follow up as needed. Call with any questions.     Tracie Bonilla MD  Allergy/Immunology Fellow

## 2017-03-07 NOTE — MR AVS SNAPSHOT
[facility] - Allergy/ Immunology  [address]  [facility] [address]  Phone: [phone]  Fax: [phone]                  Susan Puente   3/7/2017 2:00 PM   Office Visit    Description:  Female : 1950   Provider:  Tracie Bonilla MD   Department:  [facility] - Allergy/ Immunology                To Do List           Future Appointments        Provider Department Dept Phone    3/9/2017 9:45 AM LAB, [facility] SAME DAY [facility] [phone]    3/16/2017 2:00 PM LAB, [facility] SAME DAY [facility] [phone]    3/16/2017 3:20 PM Gita Valdes MD [facility] [phone]    [facility] [phone]    3/30/2017 9:30 AM LAB, [facility] SAME DAY [facility] [phone]      Goals (5 Years of Data)     None      Follow-Up and Disposition     Return if symptoms worsen or fail to improve.      [facility]     [facility] Care [facility] -  Assistance  Registered nurses in the [facility] provide clinical advisement, health education, appointment booking, and other advisory services.  Call for this free service at [phone].             Medications           Message regarding Medications     Verify the changes and/or additions to your medication regime listed below are the same as discussed with your clinician today.  If any of these changes or additions are incorrect, please notify your healthcare provider.             Verify that the below list of medications is an accurate representation of the medications you are currently taking.  If none reported, the list may be blank. If incorrect, please contact your healthcare provider. Carry this list with you in case of emergency.           Current Medications     acetaminophen (TYLENOL ARTHRITIS PAIN) 650 MG TbSR Take 650 mg by mouth every 8 (eight) hours as needed (for pain).    aspirin  (ECOTRIN) 81 MG EC tablet Take 81 mg by mouth once daily.    b complex vitamins capsule Take 1 capsule by mouth once daily.    fexofenadine (ALLEGRA) 180 MG tablet Take 1 tablet (180 mg total) by mouth once daily.    fish oil-omega-3 fatty acids 300-1,000 mg capsule Take 4 g by mouth nightly.     lenalidomide (REVLIMID) 10 mg Cap Take 10 mg by mouth once daily.    lenalidomide (REVLIMID) 2.5 mg Cap Take 2.5 mg by mouth once daily.    lisinopril-hydrochlorothiazide (PRINZIDE,ZESTORETIC) 20-12.5 mg per tablet Take 2 tablets by mouth once daily.    metformin (GLUCOPHAGE) 500 MG tablet Take 0.5 tablets (250 mg total) by mouth 2 (two) times daily with meals.    nicotine (NICODERM CQ) 21 mg/24 hr Place 1 patch onto the skin once daily. Generic preferred    nicotine (NICOTROL) 10 mg Crtg One puff into the mouth as needed, maximum 6 cartridges/day.           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 Unfortunately, this means we cannot offer you desensitization.     This rash is a reaction that you can treat through with antihistamines and steroids, so this may be an option.     The other option is to change medications and see if an equally efficacious alternative medication has fewer side effects.        Smoking Cessation     If you would like to quit smoking:   You may be eligible for free services if you are a [place] resident and started smoking cigarettes before September 1, 1988.  Call the [employer]oking [employer]essation [employer] (SCT) toll free at (976) 578-8136 or ([phone].   Call 3-886-QUIT-NOW if you do not meet the above criteria.            Language Assistance Services     ATTENTION: Language assistance services are available, free of charge. Please call [phone].      ATENCIÓN: jaquan Garcia disposición  servicios gratuitos de asistencia lingüística. Elie coffman [phone].     XAVI Ý: N?u b?n nói Ti?ng Vi?t, có các d?ch v? h? tr? ngôn ng? mi?n phí dành cho b?n. G?i s? [phone].         [facility] complies with applicable Federal civil rights laws and does not discriminate on the basis of race, color, national origin, age, disability, or sex.

## 2017-03-08 DIAGNOSIS — D46.C MDS (MYELODYSPLASTIC SYNDROME) WITH 5Q DELETION: Primary | ICD-10-CM

## 2017-03-09 ENCOUNTER — TELEPHONE (OUTPATIENT)
Dept: PHARMACY | Facility: CLINIC | Age: 67
End: 2017-03-09

## 2017-03-09 ENCOUNTER — TELEPHONE (OUTPATIENT)
Dept: HEMATOLOGY/ONCOLOGY | Facility: CLINIC | Age: 67
End: 2017-03-09

## 2017-03-09 ENCOUNTER — INFUSION (OUTPATIENT)
Dept: INFUSION THERAPY | Facility: HOSPITAL | Age: 67
End: 2017-03-09
Attending: INTERNAL MEDICINE
Payer: MEDICARE

## 2017-03-09 VITALS
SYSTOLIC BLOOD PRESSURE: 129 MMHG | TEMPERATURE: 98 F | DIASTOLIC BLOOD PRESSURE: 59 MMHG | RESPIRATION RATE: 16 BRPM | HEART RATE: 86 BPM

## 2017-03-09 DIAGNOSIS — D64.9 ANEMIA: Primary | ICD-10-CM

## 2017-03-09 DIAGNOSIS — D46.C MDS (MYELODYSPLASTIC SYNDROME) WITH 5Q DELETION: Primary | ICD-10-CM

## 2017-03-09 PROCEDURE — 36430 TRANSFUSION BLD/BLD COMPNT: CPT

## 2017-03-09 RX ORDER — HYDROCODONE BITARTRATE AND ACETAMINOPHEN 500; 5 MG/1; MG/1
TABLET ORAL
Status: DISCONTINUED | OUTPATIENT
Start: 2017-03-09 | End: 2017-04-06

## 2017-03-09 RX ORDER — HYDROCODONE BITARTRATE AND ACETAMINOPHEN 500; 5 MG/1; MG/1
TABLET ORAL
Status: DISCONTINUED | OUTPATIENT
Start: 2017-03-09 | End: 2017-03-09 | Stop reason: HOSPADM

## 2017-03-09 NOTE — MR AVS SNAPSHOT
Patient Information     Patient Name Sex Susan Cheung Female 1950      Visit Information        [facility]    3/9/2017 11:00 AM [facility] [phone] Fidencio Jones      Patient Instructions     None      Your Current Medications Are     acetaminophen (TYLENOL ARTHRITIS PAIN) 650 MG TbSR    aspirin (ECOTRIN) 81 MG EC tablet    b complex vitamins capsule    fexofenadine (ALLEGRA) 180 MG tablet    fish oil-omega-3 fatty acids 300-1,000 mg capsule    lisinopril-hydrochlorothiazide (PRINZIDE,ZESTORETIC) 20-12.5 mg per tablet    metformin (GLUCOPHAGE) 500 MG tablet    nicotine (NICODERM CQ) 21 mg/24 hr    nicotine (NICOTROL) 10 mg Crtg    thalidomide (THALOMID) 100 MG Cap capsule      Facility-Administered Medications     0.9%  NaCl infusion (for blood administration)    0.9%  NaCl infusion (for blood administration)    acetaminophen tablet 650 mg    diphenhydrAMINE capsule 25 mg    0.9%  NaCl infusion (for blood administration) (Discontinued)      Appointments for Next Year     3/16/2017  2:00 PM NON FASTING LAB (15 min.) [facility] LAB, [facility] SAME DAY    Arrive at check-in approximately 15 minutes before your scheduled appointment time. Bring all outside medical records and imaging, along with a list of your current medications and insurance card.    [facility] 3rd Floor    3/16/2017  3:20 PM ESTABLISHED PATIENT (20 min.) [facility] Gita Valdes MD    Arrive at check-in approximately 15 minutes before your scheduled appointment time. Bring all outside medical records and imaging, along with a list of your current medications and insurance card.    [facility]    3/23/2017  9:30 AM NON FASTING LAB (15 min.) [facility] LAB, [facility] SAME DAY    Arrive at check-in approximately 15 minutes before your scheduled appointment time. Bring all outside medical records and  imaging, along with a list of your current medications and insurance card.    [facility] 3rd Floor    3/30/2017  9:30 AM NON FASTING LAB (15 min.) [facility] LAB, [facility] SAME DAY    Arrive at check-in approximately 15 minutes before your scheduled appointment time. Bring all outside medical records and imaging, along with a list of your current medications and insurance card.    [facility] 3rd Floor    4/6/2017  9:30 AM NON FASTING LAB (15 min.) [facility] LAB, [facility] SAME DAY    Arrive at check-in approximately 15 minutes before your scheduled appointment time. Bring all outside medical records and imaging, along with a list of your current medications and insurance card.    [address]         Default Flowsheet Data (last 24 hours)      Amb Complex Vitals Pj        03/09/17 1644 03/09/17 1504 03/09/17 1439 03/09/17 1420 03/09/17 1242    Measurements    BP (!)  (P)  165/71 (!)  129/59 139/65 (!)  122/57 111/81    Temp (P)  98.2 °F (36.8 °C) 98.1 °F (36.7 °C) 98 °F (36.7 °C) 98.1 °F (36.7 °C) 98.1 °F (36.7 °C)    Pulse (P)  78 86 84 79 81    Resp (P)  16 16 16 16 16       03/09/17 1225 03/09/17 1157             Measurements    BP (!)  124/56 (!)  124/56       Temp 98.2 °F (36.8 °C) 98.2 °F (36.8 °C)       Pulse 95 95       Resp 16 16       Pain Assessment    Pain Score  Zero               Allergies     No Known Allergies      Current Discharge Medication List     Cannot display discharge medications since this is not an admission.

## 2017-03-09 NOTE — PLAN OF CARE
Problem: Patient Care Overview (Adult)  Goal: Individualization & Mutuality  Outcome: Ongoing (interventions implemented as appropriate)  1150-Labs , hx, and medications reviewed, patient here for blood transfusion, Md to come up and perform consent. Assessment completed. Discussed plan of care with patient. Patient in agreement. Chair reclined and warm blanket and snack offered.

## 2017-03-09 NOTE — PLAN OF CARE
Problem: Patient Care Overview (Adult)  Goal: Plan of Care Review  Outcome: Ongoing (interventions implemented as appropriate)  1650-Patient tolerated 2 units PRBCS with no issues. Discharged without complaints or S/S of adverse event. Patient verbalized understanding signs of delayed transfusion reaction to look out for.  AVS given.  Instructed to call provider for any questions or concerns.

## 2017-03-16 ENCOUNTER — OFFICE VISIT (OUTPATIENT)
Dept: HEMATOLOGY/ONCOLOGY | Facility: CLINIC | Age: 67
End: 2017-03-16
Payer: MEDICARE

## 2017-03-16 ENCOUNTER — LAB VISIT (OUTPATIENT)
Dept: LAB | Facility: HOSPITAL | Age: 67
End: 2017-03-16
Attending: INTERNAL MEDICINE
Payer: MEDICARE

## 2017-03-16 VITALS
TEMPERATURE: 99 F | BODY MASS INDEX: 30.08 KG/M2 | HEART RATE: 106 BPM | WEIGHT: 180.56 LBS | HEIGHT: 65 IN | RESPIRATION RATE: 16 BRPM | DIASTOLIC BLOOD PRESSURE: 62 MMHG | SYSTOLIC BLOOD PRESSURE: 118 MMHG

## 2017-03-16 DIAGNOSIS — D46.C MDS (MYELODYSPLASTIC SYNDROME) WITH 5Q DELETION: Primary | ICD-10-CM

## 2017-03-16 DIAGNOSIS — D46.C MDS (MYELODYSPLASTIC SYNDROME) WITH 5Q DELETION: ICD-10-CM

## 2017-03-16 LAB
ABO + RH BLD: NORMAL
BASOPHILS # BLD AUTO: 0.01 K/UL
BASOPHILS NFR BLD: 0.3 %
BLD GP AB SCN CELLS X3 SERPL QL: NORMAL
DIFFERENTIAL METHOD: ABNORMAL
EOSINOPHIL # BLD AUTO: 0.2 K/UL
EOSINOPHIL NFR BLD: 6.3 %
ERYTHROCYTE [DISTWIDTH] IN BLOOD BY AUTOMATED COUNT: 22 %
HCT VFR BLD AUTO: 26.1 %
HGB BLD-MCNC: 8.9 G/DL
LYMPHOCYTES # BLD AUTO: 1.6 K/UL
LYMPHOCYTES NFR BLD: 42.2 %
MCH RBC QN AUTO: 33.3 PG
MCHC RBC AUTO-ENTMCNC: 34.1 %
MCV RBC AUTO: 98 FL
MONOCYTES # BLD AUTO: 0.2 K/UL
MONOCYTES NFR BLD: 4.2 %
NEUTROPHILS # BLD AUTO: 1.8 K/UL
NEUTROPHILS NFR BLD: 46.2 %
PLATELET # BLD AUTO: 146 K/UL
PMV BLD AUTO: 10.8 FL
RBC # BLD AUTO: 2.67 M/UL
WBC # BLD AUTO: 3.84 K/UL

## 2017-03-16 PROCEDURE — 1159F MED LIST DOCD IN RCRD: CPT | Mod: S$GLB,,, | Performed by: INTERNAL MEDICINE

## 2017-03-16 PROCEDURE — 1126F AMNT PAIN NOTED NONE PRSNT: CPT | Mod: S$GLB,,, | Performed by: INTERNAL MEDICINE

## 2017-03-16 PROCEDURE — 3078F DIAST BP <80 MM HG: CPT | Mod: S$GLB,,, | Performed by: INTERNAL MEDICINE

## 2017-03-16 PROCEDURE — 99999 PR PBB SHADOW E&M-EST. PATIENT-LVL III: CPT | Mod: PBBFAC,,, | Performed by: INTERNAL MEDICINE

## 2017-03-16 PROCEDURE — 1160F RVW MEDS BY RX/DR IN RCRD: CPT | Mod: S$GLB,,, | Performed by: INTERNAL MEDICINE

## 2017-03-16 PROCEDURE — 3074F SYST BP LT 130 MM HG: CPT | Mod: S$GLB,,, | Performed by: INTERNAL MEDICINE

## 2017-03-16 PROCEDURE — 99215 OFFICE O/P EST HI 40 MIN: CPT | Mod: S$GLB,,, | Performed by: INTERNAL MEDICINE

## 2017-03-16 PROCEDURE — 1157F ADVNC CARE PLAN IN RCRD: CPT | Mod: S$GLB,,, | Performed by: INTERNAL MEDICINE

## 2017-03-16 RX ORDER — PREDNISONE 5 MG/1
10 TABLET ORAL DAILY
Qty: 60 TABLET | Refills: 0 | Status: SHIPPED | OUTPATIENT
Start: 2017-03-16 | End: 2017-04-13 | Stop reason: SDUPTHER

## 2017-03-16 RX ORDER — LENALIDOMIDE 2.5 MG/1
2.5 CAPSULE ORAL DAILY
Qty: 30 CAPSULE | Refills: 0 | Status: SHIPPED | OUTPATIENT
Start: 2017-03-16 | End: 2017-04-04 | Stop reason: SDUPTHER

## 2017-03-16 NOTE — PROGRESS NOTES
Subjective:       Patient ID: Susan Puente is a 66 y.o. female.    Chief Complaint: No chief complaint on file.    HPI Comments: Interval Hx:  Patient presents today for follow up of her MDS 5 q del syndrome. Since last visit, patient has received 2 units of PRBC. Allergy saw patient to discuss possible desensitization process for Revlimid. Initially no desensitization was offered. Thalidomide has been considered as alterative but copay is high and financing options are pending. Recently however review of literature by allergist noted cases where slow desensitization has been used to reinitiate Revlimid therapy in patients treated for multiple myeloma.Patient already has funding for this medication. She is interested and willing to proceed with a slow re initiation of Revlimid if possible. She currently states that she has minor itching in scalp. Has slight rash  In mid abdomen.      History   Ms. Puente is a 66 year old female with hx of DM2, peripheral vascular disease, tobacco use, CAD, hyperlipidemia, hypertension who was hospitalized 11/10/16 for anemia. hgb 5.3  MCH 43.4 with normal WBC and platelets. Patient had normal iron stores. She was transfused 3 units of PRBCs and discharged home with hgb 8.7 on 11/11/16. She was referred for further evalutation of her anemia. On 12/16/16 patient had a normal SPEP and immunofixation. Slightly elevated kappa light chains with normal ration. Elevated vitamin b12, normal folate, JAYDEN was positive with a low titer and negative profile. Patient had a bone marrow biopsy 1/5/16 which showed the core biopsy is normocellular for age (40%); however, megakaryocytes are increased and show  frequent small, hypolobated forms. Additionally, a subset of the neutrophils are hypogranular. Blasts are not increased by either morphology (1.2%) or in the corresponding flow cytometric analysis. Fish detects a 5q deletion in 54.5% of nuclei. Cytogenetics reported 20  metaphases, 2 metaphases were normal and 18 metaphases had a 5q deletion. No additional cytogenetic abnormalities were detected. Findings consistent with 5q deletion syndrome.    Patient had a delay in obtaining revlimid 10 mg daily but did start taking the medication 2/8/17. On 2/9/17 patient developed a diffuse maculopapular rash throughout scalp arms, legs and torso.  She states she had no stridor or wheezing. She discontinued medication 2/15/17.     Review of Systems   Constitutional: Negative for chills, diaphoresis and fatigue.   HENT: Negative for congestion and nosebleeds.    Eyes: Negative for photophobia and visual disturbance.   Respiratory: Negative for cough and shortness of breath.    Cardiovascular: Negative for chest pain and leg swelling.   Gastrointestinal: Negative for abdominal distention and abdominal pain.   Endocrine: Negative for polyphagia and polyuria.   Genitourinary: Negative for dysuria and hematuria.   Musculoskeletal: Negative for back pain and joint swelling.   Skin: Positive for rash.   Neurological: Negative for light-headedness and headaches.   Hematological: Negative for adenopathy. Does not bruise/bleed easily.   Psychiatric/Behavioral: Negative for agitation and behavioral problems.       Objective:      Physical Exam   Constitutional: She is oriented to person, place, and time. She appears well-developed and well-nourished.   HENT:   Head: Normocephalic.   Mouth/Throat: No oropharyngeal exudate.   Eyes: Conjunctivae are normal. Pupils are equal, round, and reactive to light.   Neck: Normal range of motion.   Cardiovascular: Normal rate and regular rhythm.    Pulmonary/Chest: Effort normal and breath sounds normal.   Abdominal: Soft. Bowel sounds are normal.   Musculoskeletal: Normal range of motion. She exhibits no edema.   Lymphadenopathy:     She has no cervical adenopathy.   Neurological: She is alert and oriented to person, place, and time.   Skin: Skin is warm and dry.  Rash noted.   Waistline     Psychiatric: She has a normal mood and affect. Her behavior is normal.       Assessment:       1. MDS (myelodysplastic syndrome) with 5q deletion        Plan:       Will attempt slow re initiation of Revlimid as per documented reports starting with 2.5 mg of Revlimid once week one, thrice week 2, and so on ([place] Journal of Hematology, 2014, 167, 127-146). First dose to be taken in clinic and observed for one hour.  Will be taken with steroid 6 hours prior as per Allergy's recommendations which are greatly appreciated.   All questions answered to satisfaction.  Case discussed with Dr. Lucio Contreras MD   Pager [phone]

## 2017-03-23 ENCOUNTER — LAB VISIT (OUTPATIENT)
Dept: LAB | Facility: HOSPITAL | Age: 67
End: 2017-03-23
Attending: INTERNAL MEDICINE
Payer: MEDICARE

## 2017-03-23 DIAGNOSIS — D46.C MDS (MYELODYSPLASTIC SYNDROME) WITH 5Q DELETION: ICD-10-CM

## 2017-03-23 LAB
ABO + RH BLD: NORMAL
BASOPHILS # BLD AUTO: 0.02 K/UL
BASOPHILS NFR BLD: 0.5 %
BLD GP AB SCN CELLS X3 SERPL QL: NORMAL
DIFFERENTIAL METHOD: ABNORMAL
EOSINOPHIL # BLD AUTO: 0.2 K/UL
EOSINOPHIL NFR BLD: 5.2 %
ERYTHROCYTE [DISTWIDTH] IN BLOOD BY AUTOMATED COUNT: 22.6 %
HCT VFR BLD AUTO: 23.5 %
HGB BLD-MCNC: 8.2 G/DL
LYMPHOCYTES # BLD AUTO: 1.6 K/UL
LYMPHOCYTES NFR BLD: 40.6 %
MCH RBC QN AUTO: 33.9 PG
MCHC RBC AUTO-ENTMCNC: 34.9 %
MCV RBC AUTO: 97 FL
MONOCYTES # BLD AUTO: 0.3 K/UL
MONOCYTES NFR BLD: 6.2 %
NEUTROPHILS # BLD AUTO: 1.9 K/UL
NEUTROPHILS NFR BLD: 47.3 %
PLATELET # BLD AUTO: 183 K/UL
PMV BLD AUTO: 10.6 FL
RBC # BLD AUTO: 2.42 M/UL
WBC # BLD AUTO: 4.01 K/UL

## 2017-03-23 PROCEDURE — 86850 RBC ANTIBODY SCREEN: CPT

## 2017-03-23 PROCEDURE — 86900 BLOOD TYPING SEROLOGIC ABO: CPT

## 2017-03-23 PROCEDURE — 85025 COMPLETE CBC W/AUTO DIFF WBC: CPT

## 2017-03-23 PROCEDURE — 36415 COLL VENOUS BLD VENIPUNCTURE: CPT

## 2017-03-28 ENCOUNTER — CLINICAL SUPPORT (OUTPATIENT)
Dept: SMOKING CESSATION | Facility: CLINIC | Age: 67
End: 2017-03-28
Payer: COMMERCIAL

## 2017-03-28 DIAGNOSIS — F17.200 SMOKES AND MOTIVATED TO QUIT: Primary | ICD-10-CM

## 2017-03-28 PROCEDURE — 90853 GROUP PSYCHOTHERAPY: CPT | Mod: S$GLB,,,

## 2017-03-29 DIAGNOSIS — F17.210 CIGARETTE SMOKER: ICD-10-CM

## 2017-03-29 RX ORDER — IBUPROFEN 200 MG
1 TABLET ORAL DAILY
Qty: 28 PATCH | Refills: 0 | Status: SHIPPED | OUTPATIENT
Start: 2017-03-29 | End: 2017-12-27

## 2017-03-30 ENCOUNTER — LAB VISIT (OUTPATIENT)
Dept: LAB | Facility: HOSPITAL | Age: 67
End: 2017-03-30
Attending: INTERNAL MEDICINE
Payer: MEDICARE

## 2017-03-30 DIAGNOSIS — D46.C MDS (MYELODYSPLASTIC SYNDROME) WITH 5Q DELETION: ICD-10-CM

## 2017-03-30 LAB
ABO + RH BLD: NORMAL
BLD GP AB SCN CELLS X3 SERPL QL: NORMAL
ERYTHROCYTE [DISTWIDTH] IN BLOOD BY AUTOMATED COUNT: 23.9 %
HCT VFR BLD AUTO: 21.8 %
HGB BLD-MCNC: 7.5 G/DL
MCH RBC QN AUTO: 34.1 PG
MCHC RBC AUTO-ENTMCNC: 34.4 %
MCV RBC AUTO: 99 FL
NEUTROPHILS # BLD AUTO: 1.4 K/UL
PLATELET # BLD AUTO: 258 K/UL
PMV BLD AUTO: 10.4 FL
RBC # BLD AUTO: 2.2 M/UL
WBC # BLD AUTO: 3.35 K/UL

## 2017-03-30 PROCEDURE — 86901 BLOOD TYPING SEROLOGIC RH(D): CPT

## 2017-03-30 PROCEDURE — 36415 COLL VENOUS BLD VENIPUNCTURE: CPT

## 2017-03-30 PROCEDURE — 85027 COMPLETE CBC AUTOMATED: CPT

## 2017-03-30 PROCEDURE — 86900 BLOOD TYPING SEROLOGIC ABO: CPT

## 2017-03-31 NOTE — PROGRESS NOTES
Smoking Cessation Group Session #6    Site: [address]  Date:  3/28/17  Clinical Status of Patient: Outpatient   Length of Service and Code: 90 minutes - 23698   Number in Attendance: 4  Group Activities/Focus of Group:  Sharing last weeks challenges, triggers, and coping activities to remain quit and/ or keep making progress toward cessation, completion of TCRS (Tobacco Cessation Rating Scale) learned addiction model, personal reasons for quitting, medications, goals, quit date.  Specific session focus: Environmental triggers and managing environmental risks, using effective relationship strategies, relapse prevention & tips for getting back on track after a slip or relapse, wrap up summary of skills for maintaining quit.  Target symptoms:  withdrawal and medication side effects             The following were rated moderate (3) to severe (4) on TCRS:       Moderate 3: none     Severe 4:   insomnia but discounts reason as related to Chantix.  Patient's Response to Intervention: Active participation, self-disclosure, supportive of group peers.  Progress Toward Goals and Other Mental Status Changes: Patient has remained smoke free since quit date and is finding it is getting easier, less intense/ less frequent urges and cravings.  Making behavioral routine changes consistently to disconnect from smoking the cigarette at the usual/habitual times.  Maintained strong intent to stay quit and positive attitude.  Told the group how helpful it is to her.  Diagnosis:   F17.200    Plan:           Use smoking cessation aids as prescribed,   Call 24 hour quit lines and CTTS as needed for support of continued cessation.   Follow through with plan to move through triggers and urges without smoking.  Return to Clinic: 4/11/17  Will start Group again.  Quit Date:  3/2/17

## 2017-04-04 ENCOUNTER — TELEPHONE (OUTPATIENT)
Dept: HEMATOLOGY/ONCOLOGY | Facility: CLINIC | Age: 67
End: 2017-04-04

## 2017-04-04 ENCOUNTER — TELEPHONE (OUTPATIENT)
Dept: PHARMACY | Facility: CLINIC | Age: 67
End: 2017-04-04

## 2017-04-04 DIAGNOSIS — D46.C MDS (MYELODYSPLASTIC SYNDROME) WITH 5Q DELETION: ICD-10-CM

## 2017-04-04 RX ORDER — LENALIDOMIDE 2.5 MG/1
2.5 CAPSULE ORAL DAILY
Qty: 30 CAPSULE | Refills: 0 | Status: SHIPPED | OUTPATIENT
Start: 2017-04-04 | End: 2017-05-15 | Stop reason: SDUPTHER

## 2017-04-06 ENCOUNTER — DOCUMENTATION ONLY (OUTPATIENT)
Dept: HEMATOLOGY/ONCOLOGY | Facility: CLINIC | Age: 67
End: 2017-04-06

## 2017-04-06 ENCOUNTER — LAB VISIT (OUTPATIENT)
Dept: LAB | Facility: HOSPITAL | Age: 67
End: 2017-04-06
Attending: INTERNAL MEDICINE
Payer: MEDICARE

## 2017-04-06 ENCOUNTER — TELEPHONE (OUTPATIENT)
Dept: HEMATOLOGY/ONCOLOGY | Facility: CLINIC | Age: 67
End: 2017-04-06

## 2017-04-06 DIAGNOSIS — D46.C MDS (MYELODYSPLASTIC SYNDROME) WITH 5Q DELETION: ICD-10-CM

## 2017-04-06 DIAGNOSIS — D46.C MDS (MYELODYSPLASTIC SYNDROME) WITH 5Q DELETION: Primary | ICD-10-CM

## 2017-04-06 LAB
ABO + RH BLD: NORMAL
BLD GP AB SCN CELLS X3 SERPL QL: NORMAL
ERYTHROCYTE [DISTWIDTH] IN BLOOD BY AUTOMATED COUNT: 25.9 %
HCT VFR BLD AUTO: 19.9 %
HGB BLD-MCNC: 6.9 G/DL
MCH RBC QN AUTO: 34.3 PG
MCHC RBC AUTO-ENTMCNC: 34.7 %
MCV RBC AUTO: 99 FL
NEUTROPHILS # BLD AUTO: 1.7 K/UL
PLATELET # BLD AUTO: 257 K/UL
PMV BLD AUTO: 10.1 FL
RBC # BLD AUTO: 2.01 M/UL
WBC # BLD AUTO: 3.66 K/UL

## 2017-04-06 PROCEDURE — 86900 BLOOD TYPING SEROLOGIC ABO: CPT

## 2017-04-06 PROCEDURE — 86901 BLOOD TYPING SEROLOGIC RH(D): CPT

## 2017-04-06 PROCEDURE — 85027 COMPLETE CBC AUTOMATED: CPT

## 2017-04-06 PROCEDURE — 86920 COMPATIBILITY TEST SPIN: CPT

## 2017-04-06 PROCEDURE — 27201040 HC RC 50 FILTER

## 2017-04-06 PROCEDURE — 36415 COLL VENOUS BLD VENIPUNCTURE: CPT

## 2017-04-06 RX ORDER — HYDROCODONE BITARTRATE AND ACETAMINOPHEN 500; 5 MG/1; MG/1
TABLET ORAL
Status: DISCONTINUED | OUTPATIENT
Start: 2017-04-06 | End: 2019-03-13

## 2017-04-06 NOTE — TELEPHONE ENCOUNTER
Called pt to f/u on appointments for Monday, no answer, voicemail left asking her to please call back.

## 2017-04-07 NOTE — TELEPHONE ENCOUNTER
----- Message from Na James sent at 4/7/2017  2:45 PM CDT -----  Contact: Self  Pt will receive revlimid Tuesday morning.

## 2017-04-10 ENCOUNTER — DOCUMENTATION ONLY (OUTPATIENT)
Dept: HEMATOLOGY/ONCOLOGY | Facility: CLINIC | Age: 67
End: 2017-04-10

## 2017-04-10 ENCOUNTER — INFUSION (OUTPATIENT)
Dept: INFUSION THERAPY | Facility: HOSPITAL | Age: 67
End: 2017-04-10
Attending: INTERNAL MEDICINE
Payer: MEDICARE

## 2017-04-10 VITALS
TEMPERATURE: 98 F | RESPIRATION RATE: 16 BRPM | DIASTOLIC BLOOD PRESSURE: 55 MMHG | SYSTOLIC BLOOD PRESSURE: 94 MMHG | HEART RATE: 81 BPM

## 2017-04-10 DIAGNOSIS — D46.C 5Q MINUS SYNDROME: Primary | ICD-10-CM

## 2017-04-10 DIAGNOSIS — D46.C 5Q MINUS SYNDROME: ICD-10-CM

## 2017-04-10 PROCEDURE — 36430 TRANSFUSION BLD/BLD COMPNT: CPT

## 2017-04-10 PROCEDURE — 25000003 PHARM REV CODE 250: Performed by: INTERNAL MEDICINE

## 2017-04-10 PROCEDURE — 86920 COMPATIBILITY TEST SPIN: CPT

## 2017-04-10 PROCEDURE — P9038 RBC IRRADIATED: HCPCS

## 2017-04-10 RX ORDER — DIPHENHYDRAMINE HCL 25 MG
25 CAPSULE ORAL
Status: CANCELLED | OUTPATIENT
Start: 2017-04-10

## 2017-04-10 RX ORDER — ACETAMINOPHEN 325 MG/1
650 TABLET ORAL
Status: COMPLETED | OUTPATIENT
Start: 2017-04-10 | End: 2017-04-10

## 2017-04-10 RX ORDER — HYDROCODONE BITARTRATE AND ACETAMINOPHEN 500; 5 MG/1; MG/1
TABLET ORAL ONCE
Status: CANCELLED | OUTPATIENT
Start: 2017-04-10 | End: 2017-04-10

## 2017-04-10 RX ORDER — HYDROCODONE BITARTRATE AND ACETAMINOPHEN 500; 5 MG/1; MG/1
TABLET ORAL ONCE
Status: COMPLETED | OUTPATIENT
Start: 2017-04-10 | End: 2017-04-10

## 2017-04-10 RX ORDER — DIPHENHYDRAMINE HCL 25 MG
25 CAPSULE ORAL
Status: COMPLETED | OUTPATIENT
Start: 2017-04-10 | End: 2017-04-10

## 2017-04-10 RX ORDER — ACETAMINOPHEN 325 MG/1
650 TABLET ORAL
Status: CANCELLED | OUTPATIENT
Start: 2017-04-10

## 2017-04-10 RX ADMIN — SODIUM CHLORIDE: 0.9 INJECTION, SOLUTION INTRAVENOUS at 03:04

## 2017-04-10 RX ADMIN — ACETAMINOPHEN 650 MG: 325 TABLET ORAL at 03:04

## 2017-04-10 RX ADMIN — DIPHENHYDRAMINE HYDROCHLORIDE 25 MG: 25 CAPSULE ORAL at 03:04

## 2017-04-10 NOTE — PLAN OF CARE
Problem: Patient Care Overview (Adult)  Goal: Discharge Needs Assessment  Outcome: Ongoing (interventions implemented as appropriate)  Tolerated well. No complaints. VSS.

## 2017-04-11 ENCOUNTER — CLINICAL SUPPORT (OUTPATIENT)
Dept: SMOKING CESSATION | Facility: CLINIC | Age: 67
End: 2017-04-11
Payer: COMMERCIAL

## 2017-04-11 DIAGNOSIS — F17.200 SMOKES AND MOTIVATED TO QUIT: Primary | ICD-10-CM

## 2017-04-11 PROCEDURE — 86644 CMV ANTIBODY: CPT

## 2017-04-11 PROCEDURE — 90853 GROUP PSYCHOTHERAPY: CPT | Mod: S$GLB,,,

## 2017-04-12 NOTE — PROGRESS NOTES
Smoking Cessation Group Session #1    Site:  [facility]  Date:  4/11/17  Clinical Status of Patient: Outpatient   Length of Service and Code: 90 minutes - 45650   Number in Attendance: 4  Group Activities/Focus of Group:  orientation, client introductions, completion of TCRS (Tobacco Cessation Rating Scale) learned addiction model, cues/triggers, personal reasons for quitting, medications, goals, quit date preparation.  Target symptoms:  withdrawal and medication side effects             The following were rated moderate (3) to severe (4) on TCRS:       Moderate 3: none     Severe 4:   none  Patient's Response to Intervention: Active participation, self-disclosure, supportive of group peers.  Progress Toward Goals and Other Mental Status Changes:  Has not smoked since before her quit date and has not had any slips.  Shared smoking history with the group, reasons for quitting, and strong motivation to stay quit.  Diagnosis: F17.210  Plan: Group therapy, individual support/cessation counseling and medication monitoring by CTTS. Medication management by providers.  Return to Clinic: 1 week                                               Quit Date: 3//3/17

## 2017-04-13 ENCOUNTER — LAB VISIT (OUTPATIENT)
Dept: LAB | Facility: HOSPITAL | Age: 67
End: 2017-04-13
Attending: INTERNAL MEDICINE
Payer: MEDICARE

## 2017-04-13 ENCOUNTER — OFFICE VISIT (OUTPATIENT)
Dept: HEMATOLOGY/ONCOLOGY | Facility: CLINIC | Age: 67
End: 2017-04-13
Payer: MEDICARE

## 2017-04-13 VITALS
HEART RATE: 90 BPM | HEIGHT: 65 IN | BODY MASS INDEX: 30.23 KG/M2 | WEIGHT: 181.44 LBS | DIASTOLIC BLOOD PRESSURE: 53 MMHG | SYSTOLIC BLOOD PRESSURE: 108 MMHG | TEMPERATURE: 98 F

## 2017-04-13 DIAGNOSIS — D46.C MDS (MYELODYSPLASTIC SYNDROME) WITH 5Q DELETION: ICD-10-CM

## 2017-04-13 DIAGNOSIS — D46.C 5Q MINUS SYNDROME: Primary | ICD-10-CM

## 2017-04-13 LAB
ABO + RH BLD: NORMAL
ALBUMIN SERPL BCP-MCNC: 3.6 G/DL
ALP SERPL-CCNC: 60 U/L
ALT SERPL W/O P-5'-P-CCNC: 27 U/L
ANION GAP SERPL CALC-SCNC: 11 MMOL/L
AST SERPL-CCNC: 20 U/L
BILIRUB SERPL-MCNC: 0.5 MG/DL
BLD GP AB SCN CELLS X3 SERPL QL: NORMAL
BUN SERPL-MCNC: 28 MG/DL
CALCIUM SERPL-MCNC: 9.6 MG/DL
CHLORIDE SERPL-SCNC: 108 MMOL/L
CO2 SERPL-SCNC: 21 MMOL/L
CREAT SERPL-MCNC: 1.2 MG/DL
ERYTHROCYTE [DISTWIDTH] IN BLOOD BY AUTOMATED COUNT: 25.2 %
EST. GFR  (AFRICAN AMERICAN): 54.4 ML/MIN/1.73 M^2
EST. GFR  (NON AFRICAN AMERICAN): 47.2 ML/MIN/1.73 M^2
GLUCOSE SERPL-MCNC: 104 MG/DL
HCT VFR BLD AUTO: 23.1 %
HGB BLD-MCNC: 7.9 G/DL
MCH RBC QN AUTO: 34.1 PG
MCHC RBC AUTO-ENTMCNC: 34.2 %
MCV RBC AUTO: 100 FL
NEUTROPHILS # BLD AUTO: 1.7 K/UL
PLATELET # BLD AUTO: 267 K/UL
PMV BLD AUTO: 11 FL
POTASSIUM SERPL-SCNC: 4.3 MMOL/L
PROT SERPL-MCNC: 7.4 G/DL
RBC # BLD AUTO: 2.32 M/UL
SODIUM SERPL-SCNC: 140 MMOL/L
WBC # BLD AUTO: 4.06 K/UL

## 2017-04-13 PROCEDURE — 1159F MED LIST DOCD IN RCRD: CPT | Mod: S$GLB,,, | Performed by: INTERNAL MEDICINE

## 2017-04-13 PROCEDURE — 1157F ADVNC CARE PLAN IN RCRD: CPT | Mod: S$GLB,,, | Performed by: INTERNAL MEDICINE

## 2017-04-13 PROCEDURE — 1126F AMNT PAIN NOTED NONE PRSNT: CPT | Mod: S$GLB,,, | Performed by: INTERNAL MEDICINE

## 2017-04-13 PROCEDURE — 1160F RVW MEDS BY RX/DR IN RCRD: CPT | Mod: S$GLB,,, | Performed by: INTERNAL MEDICINE

## 2017-04-13 PROCEDURE — 3074F SYST BP LT 130 MM HG: CPT | Mod: S$GLB,,, | Performed by: INTERNAL MEDICINE

## 2017-04-13 PROCEDURE — 3078F DIAST BP <80 MM HG: CPT | Mod: S$GLB,,, | Performed by: INTERNAL MEDICINE

## 2017-04-13 PROCEDURE — 99215 OFFICE O/P EST HI 40 MIN: CPT | Mod: S$GLB,,, | Performed by: INTERNAL MEDICINE

## 2017-04-13 PROCEDURE — 99999 PR PBB SHADOW E&M-EST. PATIENT-LVL III: CPT | Mod: PBBFAC,,, | Performed by: INTERNAL MEDICINE

## 2017-04-13 RX ORDER — PREDNISONE 5 MG/1
10 TABLET ORAL DAILY
Qty: 60 TABLET | Refills: 0 | Status: SHIPPED | OUTPATIENT
Start: 2017-04-13 | End: 2017-05-26 | Stop reason: SDUPTHER

## 2017-04-13 NOTE — PROGRESS NOTES
Subjective:       Patient ID: Susan Puente is a 66 y.o. female.    Chief Complaint: MDS 5q minuse syndrome    HPI Comments: Interval Hx:  Patient presents today for follow up of her MDS 5 q del syndrome. Since last visit, patient has received 1 unit of PRBC. Allergy desensitization to Revlimid is planned and the patient has received Rx for 2.5mg Revlimid. Rx for Prednisone has been sent to her pharmacy.     History   Ms. Puente is a 66 year old female with hx of DM2, peripheral vascular disease, tobacco use, CAD, hyperlipidemia, hypertension who was hospitalized 11/10/16 for anemia. hgb 5.3  MCH 43.4 with normal WBC and platelets. Patient had normal iron stores. She was transfused 3 units of PRBCs and discharged home with hgb 8.7 on 11/11/16. She was referred for further evalutation of her anemia. On 12/16/16 patient had a normal SPEP and immunofixation. Slightly elevated kappa light chains with normal ration. Elevated vitamin b12, normal folate, JAYDEN was positive with a low titer and negative profile. Patient had a bone marrow biopsy 1/5/16 which showed the core biopsy is normocellular for age (40%); however, megakaryocytes are increased and show  frequent small, hypolobated forms. Additionally, a subset of the neutrophils are hypogranular. Blasts are not increased by either morphology (1.2%) or in the corresponding flow cytometric analysis. Fish detects a 5q deletion in 54.5% of nuclei. Cytogenetics reported 20 metaphases, 2 metaphases were normal and 18 metaphases had a 5q deletion. No additional cytogenetic abnormalities were detected. Findings consistent with 5q deletion syndrome.    Patient had a delay in obtaining revlimid 10 mg daily but did start taking the medication 2/8/17. On 2/9/17 patient developed a diffuse maculopapular rash throughout scalp arms, legs and torso.  She states she had no stridor or wheezing. She discontinued medication 2/15/17.     Review of Systems   Constitutional:  Negative for chills, diaphoresis and fatigue.   HENT: Negative for congestion and nosebleeds.    Eyes: Negative for photophobia and visual disturbance.   Respiratory: Negative for cough and shortness of breath.    Cardiovascular: Negative for chest pain and leg swelling.   Gastrointestinal: Negative for abdominal distention and abdominal pain.   Endocrine: Negative for polyphagia and polyuria.   Genitourinary: Negative for dysuria and hematuria.   Musculoskeletal: Negative for back pain and joint swelling.   Skin: Positive for rash.   Neurological: Negative for light-headedness and headaches.   Hematological: Negative for adenopathy. Does not bruise/bleed easily.   Psychiatric/Behavioral: Negative for agitation and behavioral problems.       Objective:      Physical Exam   Constitutional: She is oriented to person, place, and time. She appears well-developed and well-nourished.   HENT:   Head: Normocephalic.   Mouth/Throat: No oropharyngeal exudate.   Eyes: Conjunctivae are normal. Pupils are equal, round, and reactive to light.   Neck: Normal range of motion.   Cardiovascular: Normal rate and regular rhythm.    Pulmonary/Chest: Effort normal and breath sounds normal.   Abdominal: Soft. Bowel sounds are normal.   Musculoskeletal: Normal range of motion. She exhibits no edema.   Lymphadenopathy:     She has no cervical adenopathy.   Neurological: She is alert and oriented to person, place, and time.   Skin: Skin is warm and dry. Rash noted.   Waistline     Psychiatric: She has a normal mood and affect. Her behavior is normal.       Assessment:       1. 5Q minus syndrome    2. MDS (myelodysplastic syndrome) with 5q deletion        Plan:       Patient will take Prednisone 10mg at bedtime Monday February 17, 2017  Appointment in clinic Tuesday February 18, 2017 at 9am when patient will come with her Revlimid 2.5mg  Medication will be administered in hematology procedure room and patient will be monitored for 1 hour  minimum for allergic reaction.

## 2017-04-15 DIAGNOSIS — F17.210 CIGARETTE SMOKER: ICD-10-CM

## 2017-04-16 RX ORDER — NICOTINE 4 MG/1
INHALANT RESPIRATORY (INHALATION)
Qty: 168 EACH | Refills: 0 | Status: SHIPPED | OUTPATIENT
Start: 2017-04-16 | End: 2019-07-15

## 2017-04-16 RX ORDER — IBUPROFEN 200 MG
TABLET ORAL
Qty: 28 PATCH | Refills: 0 | Status: SHIPPED | OUTPATIENT
Start: 2017-04-16 | End: 2017-12-27

## 2017-04-25 ENCOUNTER — CLINICAL SUPPORT (OUTPATIENT)
Dept: SMOKING CESSATION | Facility: CLINIC | Age: 67
End: 2017-04-25
Payer: MEDICARE

## 2017-04-25 DIAGNOSIS — F17.200 SMOKES AND MOTIVATED TO QUIT: Primary | ICD-10-CM

## 2017-04-25 PROCEDURE — 90853 GROUP PSYCHOTHERAPY: CPT | Mod: S$GLB,,,

## 2017-04-26 NOTE — PROGRESS NOTES
Smoking Cessation Group Session #3    Site: [facility]  Date:  4/25/17  Clinical Status of Patient: Outpatient   Length of Service and Code: 90 minutes - 23309   Number in Attendance: 3  Group Activities/Focus of Group:  Sharing last weeks challenges, triggers, and coping activities to remain quit and/ or keep making progress toward cessation, completion of TCRS (Tobacco Cessation Rating Scale) learned addiction model, personal reasons for quitting, medications, goals, quit date.  Specific session focus: Breaking the tobacco chain, willpower, medication, body triggers vulnerability factors & action plans.  Target symptoms:  withdrawal and medication side effects             The following were rated moderate (3) to severe (4) on TCRS:       Moderate 3: none     Severe 4:   none  Patient's Response to Intervention: Active participation, self-disclosure, supportive of group peers.  Progress Toward Goals and Other Mental Status Changes: Patient has remained smoke free since quit date, no slips since last Clinic visit.  Verbalized strong intent to stay quit, practicing positive coping skills learned in group.  Diagnosis: F17.200  Plan: Group therapy, individual support/cessation counseling and medication monitoring by CTTS. Medication management by providers.  Return to Clinic: 1 week  Quit Date: 3/3/17

## 2017-05-09 ENCOUNTER — CLINICAL SUPPORT (OUTPATIENT)
Dept: SMOKING CESSATION | Facility: CLINIC | Age: 67
End: 2017-05-09
Payer: COMMERCIAL

## 2017-05-09 DIAGNOSIS — F17.210 CIGARETTE NICOTINE DEPENDENCE, UNCOMPLICATED: Primary | ICD-10-CM

## 2017-05-09 PROCEDURE — 90853 GROUP PSYCHOTHERAPY: CPT | Mod: S$GLB,,,

## 2017-05-12 NOTE — PROGRESS NOTES
Smoking Cessation Group Session #4    Site:  [facility]  Date:  5/9/17  Clinical Status of Patient: Outpatient   Length of Service and Code: 90 minutes - 58616   Number in Attendance: 2  Group Activities/Focus of Group:  Sharing last weeks challenges, triggers, and coping activities to remain quit and/ or keep making progress toward cessation, completion of TCRS (Tobacco Cessation Rating Scale) learned addiction model, personal reasons for quitting, medications, goals, quit date.  Specific session focus: Triggers, tests, teachers related to the mind, managing thoughts and making action plans.   Target symptoms:  withdrawal and medication side effects             The following were rated moderate (3) to severe (4) on TCRS:       Moderate 3: none     Severe 4:   none  Patient's Response to Intervention: Active participation, self-disclosure, supportive of group peers.  Progress Toward Goals and Other Mental Status Changes:  Patient has remained smoke free since her quit date, enthusiastically practicing skills she is learning in group to help her remain quit.  Verbalized high motivation and intent to remain quit.  Diagnosis:  F17.200  Plan: Group therapy, individual support/cessation counseling and medication monitoring by CTTS. Medication management by providers.  Return to Clinic: 1 week  Quit Date:  3/3/17

## 2017-05-15 ENCOUNTER — PATIENT MESSAGE (OUTPATIENT)
Dept: HEMATOLOGY/ONCOLOGY | Facility: CLINIC | Age: 67
End: 2017-05-15

## 2017-05-15 DIAGNOSIS — D46.C MDS (MYELODYSPLASTIC SYNDROME) WITH 5Q DELETION: ICD-10-CM

## 2017-05-15 RX ORDER — LENALIDOMIDE 2.5 MG/1
2.5 CAPSULE ORAL DAILY
Qty: 8 CAPSULE | Refills: 0 | Status: ON HOLD | OUTPATIENT
Start: 2017-05-15 | End: 2017-06-08

## 2017-05-16 ENCOUNTER — PATIENT MESSAGE (OUTPATIENT)
Dept: HEMATOLOGY/ONCOLOGY | Facility: CLINIC | Age: 67
End: 2017-05-16

## 2017-05-18 ENCOUNTER — TELEPHONE (OUTPATIENT)
Dept: HEMATOLOGY/ONCOLOGY | Facility: CLINIC | Age: 67
End: 2017-05-18

## 2017-05-18 ENCOUNTER — INFUSION (OUTPATIENT)
Dept: INFUSION THERAPY | Facility: HOSPITAL | Age: 67
End: 2017-05-18
Attending: INTERNAL MEDICINE
Payer: MEDICARE

## 2017-05-18 ENCOUNTER — OFFICE VISIT (OUTPATIENT)
Dept: HEMATOLOGY/ONCOLOGY | Facility: CLINIC | Age: 67
End: 2017-05-18
Payer: MEDICARE

## 2017-05-18 VITALS
SYSTOLIC BLOOD PRESSURE: 113 MMHG | TEMPERATURE: 98 F | RESPIRATION RATE: 18 BRPM | DIASTOLIC BLOOD PRESSURE: 54 MMHG | HEART RATE: 76 BPM

## 2017-05-18 VITALS
TEMPERATURE: 98 F | DIASTOLIC BLOOD PRESSURE: 63 MMHG | WEIGHT: 177.5 LBS | SYSTOLIC BLOOD PRESSURE: 131 MMHG | BODY MASS INDEX: 29.57 KG/M2 | HEART RATE: 75 BPM | HEIGHT: 65 IN

## 2017-05-18 DIAGNOSIS — D46.C 5Q MINUS SYNDROME: Primary | ICD-10-CM

## 2017-05-18 DIAGNOSIS — D64.9 ANEMIA, UNSPECIFIED TYPE: ICD-10-CM

## 2017-05-18 DIAGNOSIS — D46.C 5Q MINUS SYNDROME: ICD-10-CM

## 2017-05-18 PROCEDURE — P9038 RBC IRRADIATED: HCPCS

## 2017-05-18 PROCEDURE — 99999 PR PBB SHADOW E&M-EST. PATIENT-LVL III: CPT | Mod: PBBFAC,,, | Performed by: INTERNAL MEDICINE

## 2017-05-18 PROCEDURE — 1159F MED LIST DOCD IN RCRD: CPT | Mod: S$GLB,,, | Performed by: INTERNAL MEDICINE

## 2017-05-18 PROCEDURE — 3078F DIAST BP <80 MM HG: CPT | Mod: S$GLB,,, | Performed by: INTERNAL MEDICINE

## 2017-05-18 PROCEDURE — 99215 OFFICE O/P EST HI 40 MIN: CPT | Mod: S$GLB,,, | Performed by: INTERNAL MEDICINE

## 2017-05-18 PROCEDURE — 1126F AMNT PAIN NOTED NONE PRSNT: CPT | Mod: S$GLB,,, | Performed by: INTERNAL MEDICINE

## 2017-05-18 PROCEDURE — 3075F SYST BP GE 130 - 139MM HG: CPT | Mod: S$GLB,,, | Performed by: INTERNAL MEDICINE

## 2017-05-18 PROCEDURE — 27201040 HC RC 50 FILTER

## 2017-05-18 PROCEDURE — 86920 COMPATIBILITY TEST SPIN: CPT

## 2017-05-18 PROCEDURE — 36430 TRANSFUSION BLD/BLD COMPNT: CPT

## 2017-05-18 PROCEDURE — 1157F ADVNC CARE PLAN IN RCRD: CPT | Mod: S$GLB,,, | Performed by: INTERNAL MEDICINE

## 2017-05-18 PROCEDURE — 1160F RVW MEDS BY RX/DR IN RCRD: CPT | Mod: S$GLB,,, | Performed by: INTERNAL MEDICINE

## 2017-05-18 RX ORDER — LENALIDOMIDE 10 MG/1
10 CAPSULE ORAL DAILY
Qty: 28 EACH | Refills: 5 | Status: SHIPPED | OUTPATIENT
Start: 2017-05-18 | End: 2017-12-27 | Stop reason: ALTCHOICE

## 2017-05-18 RX ORDER — HYDROCODONE BITARTRATE AND ACETAMINOPHEN 500; 5 MG/1; MG/1
TABLET ORAL ONCE
Status: CANCELLED | OUTPATIENT
Start: 2017-05-18 | End: 2017-05-18

## 2017-05-18 RX ORDER — HYDROCODONE BITARTRATE AND ACETAMINOPHEN 500; 5 MG/1; MG/1
TABLET ORAL ONCE
Status: DISCONTINUED | OUTPATIENT
Start: 2017-05-18 | End: 2017-05-18 | Stop reason: HOSPADM

## 2017-05-18 NOTE — Clinical Note
Patient needs a cbc and type in screen in two weeks and an appointment with me and andreea with same labs + cmp in four weeks.

## 2017-05-18 NOTE — PROGRESS NOTES
Subjective:       Patient ID: Susan Puente is a 66 y.o. female.    Chief Complaint: No chief complaint on file.    HPI Comments: Patient presents today for follow up of her MDS 5 q del syndrome. She has been on an allergy desensitization regimen to Revlimid starting with 2.5mg Revlimid once a week followed by 3 times a week and now on week 5 of program currently taking 5 mg daily with concomitant prednisone 10 mg daily. She has tolerated this dose well with faint reticular rash in bilateral lower extremities.   History   Ms. Puente is a 66 year old female with hx of DM2, peripheral vascular disease, tobacco use, CAD, hyperlipidemia, hypertension who was hospitalized 11/10/16 for anemia. hgb 5.3  MCH 43.4 with normal WBC and platelets. Patient had normal iron stores. She was transfused 3 units of PRBCs and discharged home with hgb 8.7 on 11/11/16. She was referred for further evalutation of her anemia. On 12/16/16 patient had a normal SPEP and immunofixation. Slightly elevated kappa light chains with normal ration. Elevated vitamin b12, normal folate, JAYDEN was positive with a low titer and negative profile. Patient had a bone marrow biopsy 1/5/16 which showed the core biopsy is normocellular for age (40%); however, megakaryocytes are increased and show  frequent small, hypolobated forms. Additionally, a subset of the neutrophils are hypogranular. Blasts are not increased by either morphology (1.2%) or in the corresponding flow cytometric analysis. Fish detects a 5q deletion in 54.5% of nuclei. Cytogenetics reported 20 metaphases, 2 metaphases were normal and 18 metaphases had a 5q deletion. No additional cytogenetic abnormalities were detected. Findings consistent with 5q deletion syndrome.     Patient had a delay in obtaining revlimid 10 mg daily but did start taking the medication 2/8/17. On 2/9/17 patient developed a diffuse maculopapular rash throughout scalp arms, legs and torso. She states she  had no stridor or wheezing. She discontinued medication 2/15/17. Went to allergist who provided provided references on desensitization so that patient could resume Revlimid.     Review of Systems   Constitutional: Positive for fatigue. Negative for chills, diaphoresis and fever.   HENT: Negative for congestion and trouble swallowing.    Eyes: Negative for photophobia and visual disturbance.   Respiratory: Negative for cough and shortness of breath.    Cardiovascular: Negative for chest pain and leg swelling.   Gastrointestinal: Negative for abdominal distention and abdominal pain.   Endocrine: Negative for polyphagia and polyuria.   Genitourinary: Negative for dysuria and frequency.   Musculoskeletal: Negative for back pain and gait problem.   Skin: Positive for rash. Negative for color change and pallor.   Neurological: Negative for light-headedness and headaches.   Hematological: Negative for adenopathy. Does not bruise/bleed easily.   Psychiatric/Behavioral: Negative for agitation and behavioral problems.       Objective:      Physical Exam   Constitutional: She is oriented to person, place, and time. She appears well-developed and well-nourished.   HENT:   Mouth/Throat: Oropharynx is clear and moist. No oropharyngeal exudate.   Eyes: Conjunctivae are normal. Pupils are equal, round, and reactive to light.   Neck: Normal range of motion.   Cardiovascular: Normal rate and regular rhythm.    Pulmonary/Chest: Effort normal and breath sounds normal.   Abdominal: Soft. Bowel sounds are normal.   Lymphadenopathy:     She has no cervical adenopathy.   Neurological: She is alert and oriented to person, place, and time.   Skin: Skin is warm.   Psychiatric: She has a normal mood and affect. Her behavior is normal.       Assessment:       1. 5Q minus syndrome        Plan:   Patient has tolerated 5 mg of lenalidomide daily with minimal side effects. Will proceed with 10 mg daily (q 28 day cycle) and cont prednisone 10 mg  daily with eventually slow steroid taper as tolerated.   Lab check in two weeks. RTC in 4 weeks   Blood transfusion today for hgb 6.  Case discussed with Dr. Lucio Contreras MD   Pager [phone]

## 2017-05-19 DIAGNOSIS — E11.9 TYPE 2 DIABETES MELLITUS WITHOUT COMPLICATION: ICD-10-CM

## 2017-05-19 DIAGNOSIS — D46.C 5Q MINUS SYNDROME: Primary | ICD-10-CM

## 2017-05-19 PROCEDURE — 86644 CMV ANTIBODY: CPT

## 2017-05-26 DIAGNOSIS — D46.C MDS (MYELODYSPLASTIC SYNDROME) WITH 5Q DELETION: ICD-10-CM

## 2017-05-29 ENCOUNTER — CLINICAL SUPPORT (OUTPATIENT)
Dept: SMOKING CESSATION | Facility: CLINIC | Age: 67
End: 2017-05-29
Payer: COMMERCIAL

## 2017-05-29 DIAGNOSIS — F17.200 NICOTINE DEPENDENCE: Primary | ICD-10-CM

## 2017-05-29 PROCEDURE — 99407 BEHAV CHNG SMOKING > 10 MIN: CPT | Mod: S$GLB,,,

## 2017-05-29 RX ORDER — PREDNISONE 5 MG/1
TABLET ORAL
Qty: 60 TABLET | Refills: 0 | Status: SHIPPED | OUTPATIENT
Start: 2017-05-29 | End: 2017-12-27

## 2017-06-01 ENCOUNTER — TELEPHONE (OUTPATIENT)
Dept: HEMATOLOGY/ONCOLOGY | Facility: CLINIC | Age: 67
End: 2017-06-01

## 2017-06-01 ENCOUNTER — TELEPHONE (OUTPATIENT)
Dept: OPTOMETRY | Facility: CLINIC | Age: 67
End: 2017-06-01

## 2017-06-01 ENCOUNTER — LAB VISIT (OUTPATIENT)
Dept: LAB | Facility: HOSPITAL | Age: 67
End: 2017-06-01
Attending: INTERNAL MEDICINE
Payer: MEDICARE

## 2017-06-01 DIAGNOSIS — D46.C 5Q MINUS SYNDROME: ICD-10-CM

## 2017-06-01 LAB
ABO + RH BLD: NORMAL
ANISOCYTOSIS BLD QL SMEAR: SLIGHT
BASOPHILS # BLD AUTO: 0.07 K/UL
BASOPHILS NFR BLD: 3.5 %
BLD GP AB SCN CELLS X3 SERPL QL: NORMAL
DIFFERENTIAL METHOD: ABNORMAL
EOSINOPHIL # BLD AUTO: 0.2 K/UL
EOSINOPHIL NFR BLD: 8.6 %
ERYTHROCYTE [DISTWIDTH] IN BLOOD BY AUTOMATED COUNT: ABNORMAL %
GIANT PLATELETS BLD QL SMEAR: PRESENT
HCT VFR BLD AUTO: 18 %
HGB BLD-MCNC: 6 G/DL
LYMPHOCYTES # BLD AUTO: 1.1 K/UL
LYMPHOCYTES NFR BLD: 54 %
MCH RBC QN AUTO: 39.7 PG
MCHC RBC AUTO-ENTMCNC: 33.3 %
MCV RBC AUTO: 119 FL
MONOCYTES # BLD AUTO: 0.2 K/UL
MONOCYTES NFR BLD: 7.6 %
NEUTROPHILS # BLD AUTO: 0.5 K/UL
NEUTROPHILS NFR BLD: 26.3 %
OVALOCYTES BLD QL SMEAR: ABNORMAL
PLATELET # BLD AUTO: 69 K/UL
PMV BLD AUTO: 12.6 FL
POIKILOCYTOSIS BLD QL SMEAR: SLIGHT
POLYCHROMASIA BLD QL SMEAR: ABNORMAL
RBC # BLD AUTO: 1.51 M/UL
WBC # BLD AUTO: 1.98 K/UL

## 2017-06-01 PROCEDURE — 86901 BLOOD TYPING SEROLOGIC RH(D): CPT

## 2017-06-01 PROCEDURE — 85007 BL SMEAR W/DIFF WBC COUNT: CPT

## 2017-06-01 PROCEDURE — 85027 COMPLETE CBC AUTOMATED: CPT

## 2017-06-01 PROCEDURE — 86900 BLOOD TYPING SEROLOGIC ABO: CPT

## 2017-06-02 ENCOUNTER — INFUSION (OUTPATIENT)
Dept: INFUSION THERAPY | Facility: HOSPITAL | Age: 67
End: 2017-06-02
Attending: INTERNAL MEDICINE
Payer: MEDICARE

## 2017-06-02 ENCOUNTER — TELEPHONE (OUTPATIENT)
Dept: HEMATOLOGY/ONCOLOGY | Facility: CLINIC | Age: 67
End: 2017-06-02

## 2017-06-02 VITALS
HEART RATE: 73 BPM | RESPIRATION RATE: 18 BRPM | DIASTOLIC BLOOD PRESSURE: 56 MMHG | SYSTOLIC BLOOD PRESSURE: 115 MMHG | TEMPERATURE: 98 F

## 2017-06-02 DIAGNOSIS — D64.9 ANEMIA, UNSPECIFIED TYPE: Primary | ICD-10-CM

## 2017-06-02 DIAGNOSIS — D46.C 5Q MINUS SYNDROME: Primary | ICD-10-CM

## 2017-06-02 DIAGNOSIS — D46.C MDS (MYELODYSPLASTIC SYNDROME) WITH 5Q DELETION: Primary | ICD-10-CM

## 2017-06-02 DIAGNOSIS — D64.9 ANEMIA REQUIRING TRANSFUSIONS: ICD-10-CM

## 2017-06-02 PROCEDURE — 36430 TRANSFUSION BLD/BLD COMPNT: CPT

## 2017-06-02 PROCEDURE — 25000003 PHARM REV CODE 250: Performed by: INTERNAL MEDICINE

## 2017-06-02 RX ORDER — HYDROCODONE BITARTRATE AND ACETAMINOPHEN 500; 5 MG/1; MG/1
TABLET ORAL
Status: DISCONTINUED | OUTPATIENT
Start: 2017-06-02 | End: 2017-06-02 | Stop reason: HOSPADM

## 2017-06-02 RX ORDER — DIPHENHYDRAMINE HCL 25 MG
25 CAPSULE ORAL
Status: COMPLETED | OUTPATIENT
Start: 2017-06-02 | End: 2017-06-02

## 2017-06-02 RX ORDER — ACETAMINOPHEN 325 MG/1
650 TABLET ORAL
Status: COMPLETED | OUTPATIENT
Start: 2017-06-02 | End: 2017-06-02

## 2017-06-02 RX ADMIN — ACETAMINOPHEN 650 MG: 325 TABLET ORAL at 12:06

## 2017-06-02 RX ADMIN — SODIUM CHLORIDE: 0.9 INJECTION, SOLUTION INTRAVENOUS at 12:06

## 2017-06-02 RX ADMIN — DIPHENHYDRAMINE HYDROCHLORIDE 25 MG: 25 CAPSULE ORAL at 12:06

## 2017-06-02 NOTE — PLAN OF CARE
Problem: Patient Care Overview  Goal: Individualization & Mutuality  Outcome: Ongoing (interventions implemented as appropriate)  1205-Labs , hx, and medications reviewed, patient is here for unit of blood. Assessment completed. Discussed plan of care with patient. Patient in agreement. Chair reclined and warm blanket and snack offered.

## 2017-06-02 NOTE — PLAN OF CARE
Problem: Patient Care Overview  Goal: Plan of Care Review  1500-Patient tolerated treatment well. Discharged without complaints or S/S of adverse event. AVS given.  Instructed to call provider for any questions or concerns.

## 2017-06-02 NOTE — TELEPHONE ENCOUNTER
Called pt to schedule his BMBX , pt was told to report to the second floor in the main hospital Same Day Surgery Dept. Pt was told to be NPO starting at midnight the day prior to surgery. Pt was advised to hold all blood thinning medications prior to his BMBX & was advised to have a ride home. Pt voiced verbal understanding of these directions. Will also mail instruction sheet to pt's home.  Case request pended and routed to Fidelina Reddy.    Elena Dominguez

## 2017-06-05 ENCOUNTER — OFFICE VISIT (OUTPATIENT)
Dept: OPTOMETRY | Facility: CLINIC | Age: 67
End: 2017-06-05
Payer: MEDICARE

## 2017-06-05 DIAGNOSIS — H52.203 ASTIGMATISM OF BOTH EYES, UNSPECIFIED TYPE: ICD-10-CM

## 2017-06-05 DIAGNOSIS — H43.813 PVD (POSTERIOR VITREOUS DETACHMENT), BOTH EYES: ICD-10-CM

## 2017-06-05 DIAGNOSIS — H52.4 PRESBYOPIA: ICD-10-CM

## 2017-06-05 DIAGNOSIS — E11.9 TYPE 2 DIABETES MELLITUS WITHOUT OPHTHALMIC MANIFESTATIONS: Primary | ICD-10-CM

## 2017-06-05 DIAGNOSIS — H25.13 NS (NUCLEAR SCLEROSIS), BILATERAL: ICD-10-CM

## 2017-06-05 PROCEDURE — 99999 PR PBB SHADOW E&M-EST. PATIENT-LVL II: CPT | Mod: PBBFAC,,, | Performed by: OPTOMETRIST

## 2017-06-05 PROCEDURE — 92015 DETERMINE REFRACTIVE STATE: CPT | Mod: S$GLB,,, | Performed by: OPTOMETRIST

## 2017-06-05 PROCEDURE — 92004 COMPRE OPH EXAM NEW PT 1/>: CPT | Mod: S$GLB,,, | Performed by: OPTOMETRIST

## 2017-06-05 NOTE — LETTER
June 5, 2017      Claudia Rapp MD  [address]  [facility] [address]           [facility]  [address]  [facility] [address]  Phone: [phone]  Fax: [phone]          Patient: Susan Puente   MR Number: [id number]   YOB: 1950   Date of Visit: 6/5/2017       Dear Dr. Claudia Rapp:    Thank you for referring Susan Puente to me for evaluation. Attached you will find relevant portions of my assessment and plan of care.    If you have questions, please do not hesitate to call me. I look forward to following Susan Puente along with you.    Sincerely,    Sinai English, OD    Enclosure  CC:  No Recipients    If you would like to receive this communication electronically, please contact [email or web address] or [phone] to request more information on [employer] [facility] access.    For providers and/or their staff who would like to refer a patient to [facility], please contact us through our one-stop-shop provider referral line, [facility], at [phone].    If you feel you have received this communication in error or would no longer like to receive these types of communications, please e-mail [email or web address]

## 2017-06-05 NOTE — PROGRESS NOTES
HPI     Patient's age: 66 y.o.    Approximate date of last eye examination:  years  Name of last eye doctor seen:     Pt states that she is here for a diabetic eye exam, this year notice the   black spot and every now and then will notice a flash to the left side.    Wears glasses? yes       Wears CLs?:  no            Headaches?  no  Eye pain/discomfort?  no                                                                                     Flashes?  yes every now and then  Floaters?  yes  Diplopia/Double vision?  no    Patient's Ocular History:         Any eye surgeries? no         Family history of eye disease?  no    Significant patient medical history:         1. Diabetes? yes        If yes, IDDM or NIDDM? NIDDM    2. HBP?  Yes, medication and diet control                   ! OTC eyedrops currently using:  none   ! Prescription eye meds currently using:  None    Hemoglobin A1C       Date                     Value               Ref Range             Status                11/10/2016               5.7                 4.5 - 6.2 %           Final                    04/07/2016               6.0                 4.5 - 6.2 %           Final            ----------           Last edited by Kaylee Ontiveros MA on 6/5/2017  2:07 PM. (History)            Assessment /Plan     For exam results, see Encounter Report.    Type 2 diabetes mellitus without ophthalmic manifestations   Monitor yearly    PVD (posterior vitreous detachment), both eyes   No holes, tears or RD, monitor    NS (nuclear sclerosis), bilateral   Mild, not visually significant, monitor     Presbyopia  Astigmatism of both eyes, unspecified type   Rx specs    RTC 1 year, sooner PRN

## 2017-06-08 ENCOUNTER — ANESTHESIA EVENT (OUTPATIENT)
Dept: SURGERY | Facility: HOSPITAL | Age: 67
End: 2017-06-08
Payer: MEDICARE

## 2017-06-08 ENCOUNTER — HOSPITAL ENCOUNTER (OUTPATIENT)
Facility: HOSPITAL | Age: 67
Discharge: HOME | End: 2017-06-08
Attending: INTERNAL MEDICINE | Admitting: INTERNAL MEDICINE
Payer: MEDICARE

## 2017-06-08 ENCOUNTER — ANESTHESIA (OUTPATIENT)
Dept: SURGERY | Facility: HOSPITAL | Age: 67
End: 2017-06-08
Payer: MEDICARE

## 2017-06-08 ENCOUNTER — SURGERY (OUTPATIENT)
Age: 67
End: 2017-06-08

## 2017-06-08 VITALS
SYSTOLIC BLOOD PRESSURE: 108 MMHG | WEIGHT: 175 LBS | HEIGHT: 65 IN | HEART RATE: 73 BPM | RESPIRATION RATE: 16 BRPM | BODY MASS INDEX: 29.16 KG/M2 | DIASTOLIC BLOOD PRESSURE: 51 MMHG | OXYGEN SATURATION: 98 % | TEMPERATURE: 98 F

## 2017-06-08 DIAGNOSIS — D46.C MDS (MYELODYSPLASTIC SYNDROME) WITH 5Q DELETION: ICD-10-CM

## 2017-06-08 LAB
ABO + RH BLD: NORMAL
ALBUMIN SERPL BCP-MCNC: 3.5 G/DL
ALP SERPL-CCNC: 55 U/L
ALT SERPL W/O P-5'-P-CCNC: 31 U/L
ANION GAP SERPL CALC-SCNC: 10 MMOL/L
ANISOCYTOSIS BLD QL SMEAR: SLIGHT
AST SERPL-CCNC: 18 U/L
BASOPHILS # BLD AUTO: 0.06 K/UL
BASOPHILS NFR BLD: 3.4 %
BILIRUB SERPL-MCNC: 0.6 MG/DL
BLD GP AB SCN CELLS X3 SERPL QL: NORMAL
BODY SITE - BONE MARROW: NORMAL
BONE MARROW IRON STAIN COMMENT: NORMAL
BONE MARROW WRIGHT STAIN COMMENT: NORMAL
BUN SERPL-MCNC: 39 MG/DL
CALCIUM SERPL-MCNC: 10.2 MG/DL
CHLORIDE SERPL-SCNC: 107 MMOL/L
CLINICAL DIAGNOSIS - BONE MARROW: NORMAL
CO2 SERPL-SCNC: 21 MMOL/L
CREAT SERPL-MCNC: 1.3 MG/DL
DIFFERENTIAL METHOD: ABNORMAL
EOSINOPHIL # BLD AUTO: 0.1 K/UL
EOSINOPHIL NFR BLD: 6.3 %
ERYTHROCYTE [DISTWIDTH] IN BLOOD BY AUTOMATED COUNT: ABNORMAL %
EST. GFR  (AFRICAN AMERICAN): 49.4 ML/MIN/1.73 M^2
EST. GFR  (NON AFRICAN AMERICAN): 42.9 ML/MIN/1.73 M^2
ESTIMATED AVG GLUCOSE: 100 MG/DL
FLOW CYTOMETRY ANTIBODIES ANALYZED - BONE MARROW: NORMAL
FLOW CYTOMETRY COMMENT - BONE MARROW: NORMAL
FLOW CYTOMETRY INTERPRETATION - BONE MARROW: NORMAL
GLUCOSE SERPL-MCNC: 108 MG/DL
HBA1C MFR BLD HPLC: 5.1 %
HCT VFR BLD AUTO: 17.4 %
HGB BLD-MCNC: 6 G/DL
LYMPHOCYTES # BLD AUTO: 1 K/UL
LYMPHOCYTES NFR BLD: 59.2 %
MCH RBC QN AUTO: 39 PG
MCHC RBC AUTO-ENTMCNC: 34.5 %
MCV RBC AUTO: 113 FL
MONOCYTES # BLD AUTO: 0.1 K/UL
MONOCYTES NFR BLD: 6.9 %
NEUTROPHILS # BLD AUTO: 0.4 K/UL
NEUTROPHILS NFR BLD: 24.2 %
PLATELET # BLD AUTO: 41 K/UL
PLATELET BLD QL SMEAR: ABNORMAL
PMV BLD AUTO: ABNORMAL FL
POCT GLUCOSE: 114 MG/DL (ref 70–110)
POCT GLUCOSE: 125 MG/DL (ref 70–110)
POTASSIUM SERPL-SCNC: 5.1 MMOL/L
PROT SERPL-MCNC: 6.8 G/DL
RBC # BLD AUTO: 1.54 M/UL
SODIUM SERPL-SCNC: 138 MMOL/L
WBC # BLD AUTO: 1.74 K/UL

## 2017-06-08 PROCEDURE — D9220A PRA ANESTHESIA: Mod: CRNA,,, | Performed by: NURSE ANESTHETIST, CERTIFIED REGISTERED

## 2017-06-08 PROCEDURE — 88264 CHROMOSOME ANALYSIS 20-25: CPT

## 2017-06-08 PROCEDURE — 85097 BONE MARROW INTERPRETATION: CPT | Mod: ,,, | Performed by: PATHOLOGY

## 2017-06-08 PROCEDURE — 86850 RBC ANTIBODY SCREEN: CPT

## 2017-06-08 PROCEDURE — 36000705 HC OR TIME LEV I EA ADD 15 MIN: Performed by: INTERNAL MEDICINE

## 2017-06-08 PROCEDURE — 88237 TISSUE CULTURE BONE MARROW: CPT

## 2017-06-08 PROCEDURE — 88305 TISSUE EXAM BY PATHOLOGIST: CPT | Mod: 26,,, | Performed by: PATHOLOGY

## 2017-06-08 PROCEDURE — 82962 GLUCOSE BLOOD TEST: CPT | Performed by: INTERNAL MEDICINE

## 2017-06-08 PROCEDURE — 88299 UNLISTED CYTOGENETIC STUDY: CPT

## 2017-06-08 PROCEDURE — 37000008 HC ANESTHESIA 1ST 15 MINUTES: Performed by: INTERNAL MEDICINE

## 2017-06-08 PROCEDURE — 36415 COLL VENOUS BLD VENIPUNCTURE: CPT

## 2017-06-08 PROCEDURE — 88271 CYTOGENETICS DNA PROBE: CPT | Mod: 59

## 2017-06-08 PROCEDURE — 88342 IMHCHEM/IMCYTCHM 1ST ANTB: CPT | Mod: 26,,, | Performed by: PATHOLOGY

## 2017-06-08 PROCEDURE — 63600175 PHARM REV CODE 636 W HCPCS: Performed by: NURSE ANESTHETIST, CERTIFIED REGISTERED

## 2017-06-08 PROCEDURE — 86900 BLOOD TYPING SEROLOGIC ABO: CPT

## 2017-06-08 PROCEDURE — 88291 CYTO/MOLECULAR REPORT: CPT

## 2017-06-08 PROCEDURE — 88311 DECALCIFY TISSUE: CPT | Mod: 26,,, | Performed by: PATHOLOGY

## 2017-06-08 PROCEDURE — 36000704 HC OR TIME LEV I 1ST 15 MIN: Performed by: INTERNAL MEDICINE

## 2017-06-08 PROCEDURE — 71000044 HC DOSC ROUTINE RECOVERY FIRST HOUR: Performed by: INTERNAL MEDICINE

## 2017-06-08 PROCEDURE — 88275 CYTOGENETICS 100-300: CPT | Mod: 59

## 2017-06-08 PROCEDURE — D9220A PRA ANESTHESIA: Mod: ANES,,, | Performed by: ANESTHESIOLOGY

## 2017-06-08 PROCEDURE — 25000003 PHARM REV CODE 250: Performed by: NURSE ANESTHETIST, CERTIFIED REGISTERED

## 2017-06-08 PROCEDURE — 83036 HEMOGLOBIN GLYCOSYLATED A1C: CPT

## 2017-06-08 PROCEDURE — 85025 COMPLETE CBC W/AUTO DIFF WBC: CPT

## 2017-06-08 PROCEDURE — 88341 IMHCHEM/IMCYTCHM EA ADD ANTB: CPT | Performed by: PATHOLOGY

## 2017-06-08 PROCEDURE — 25000003 PHARM REV CODE 250: Performed by: INTERNAL MEDICINE

## 2017-06-08 PROCEDURE — 88184 FLOWCYTOMETRY/ TC 1 MARKER: CPT | Performed by: PATHOLOGY

## 2017-06-08 PROCEDURE — 81450 HL NEO GSAP 5-50DNA/DNA&RNA: CPT

## 2017-06-08 PROCEDURE — 88341 IMHCHEM/IMCYTCHM EA ADD ANTB: CPT | Mod: 26,,, | Performed by: PATHOLOGY

## 2017-06-08 PROCEDURE — 71000015 HC POSTOP RECOV 1ST HR: Performed by: INTERNAL MEDICINE

## 2017-06-08 PROCEDURE — 88313 SPECIAL STAINS GROUP 2: CPT | Mod: 26,,, | Performed by: PATHOLOGY

## 2017-06-08 PROCEDURE — 80053 COMPREHEN METABOLIC PANEL: CPT

## 2017-06-08 PROCEDURE — 37000009 HC ANESTHESIA EA ADD 15 MINS: Performed by: INTERNAL MEDICINE

## 2017-06-08 PROCEDURE — 88185 FLOWCYTOMETRY/TC ADD-ON: CPT | Mod: 59 | Performed by: PATHOLOGY

## 2017-06-08 PROCEDURE — 88305 TISSUE EXAM BY PATHOLOGIST: CPT | Performed by: PATHOLOGY

## 2017-06-08 PROCEDURE — 88275 CYTOGENETICS 100-300: CPT

## 2017-06-08 PROCEDURE — 38221 DX BONE MARROW BIOPSIES: CPT | Mod: LT,,, | Performed by: NURSE PRACTITIONER

## 2017-06-08 PROCEDURE — 25000003 PHARM REV CODE 250: Performed by: ANESTHESIOLOGY

## 2017-06-08 PROCEDURE — 88189 FLOWCYTOMETRY/READ 16 & >: CPT | Mod: ,,, | Performed by: PATHOLOGY

## 2017-06-08 PROCEDURE — 88313 SPECIAL STAINS GROUP 2: CPT

## 2017-06-08 RX ORDER — PROPOFOL 10 MG/ML
VIAL (ML) INTRAVENOUS
Status: DISCONTINUED | OUTPATIENT
Start: 2017-06-08 | End: 2017-06-08

## 2017-06-08 RX ORDER — PROPOFOL 10 MG/ML
VIAL (ML) INTRAVENOUS CONTINUOUS PRN
Status: DISCONTINUED | OUTPATIENT
Start: 2017-06-08 | End: 2017-06-08

## 2017-06-08 RX ORDER — LIDOCAINE HYDROCHLORIDE 20 MG/ML
INJECTION, SOLUTION EPIDURAL; INFILTRATION; INTRACAUDAL; PERINEURAL
Status: DISCONTINUED | OUTPATIENT
Start: 2017-06-08 | End: 2017-06-08 | Stop reason: HOSPADM

## 2017-06-08 RX ORDER — LIDOCAINE HCL/PF 100 MG/5ML
SYRINGE (ML) INTRAVENOUS
Status: DISCONTINUED | OUTPATIENT
Start: 2017-06-08 | End: 2017-06-08

## 2017-06-08 RX ORDER — LIDOCAINE HYDROCHLORIDE 10 MG/ML
1 INJECTION, SOLUTION EPIDURAL; INFILTRATION; INTRACAUDAL; PERINEURAL ONCE
Status: COMPLETED | OUTPATIENT
Start: 2017-06-08 | End: 2017-06-08

## 2017-06-08 RX ORDER — SODIUM CHLORIDE 9 MG/ML
INJECTION, SOLUTION INTRAVENOUS CONTINUOUS
Status: DISCONTINUED | OUTPATIENT
Start: 2017-06-08 | End: 2017-06-08 | Stop reason: HOSPADM

## 2017-06-08 RX ADMIN — PROPOFOL 20 MG: 10 INJECTION, EMULSION INTRAVENOUS at 08:06

## 2017-06-08 RX ADMIN — PROPOFOL 150 MCG/KG/MIN: 10 INJECTION, EMULSION INTRAVENOUS at 08:06

## 2017-06-08 RX ADMIN — PROPOFOL 30 MG: 10 INJECTION, EMULSION INTRAVENOUS at 08:06

## 2017-06-08 RX ADMIN — SODIUM CHLORIDE: 0.9 INJECTION, SOLUTION INTRAVENOUS at 07:06

## 2017-06-08 RX ADMIN — LIDOCAINE HYDROCHLORIDE 50 MG: 20 INJECTION, SOLUTION INTRAVENOUS at 08:06

## 2017-06-08 RX ADMIN — LIDOCAINE HYDROCHLORIDE 5 ML: 20 INJECTION, SOLUTION EPIDURAL; INFILTRATION; INTRACAUDAL; PERINEURAL at 07:06

## 2017-06-08 RX ADMIN — LIDOCAINE HYDROCHLORIDE 0.1 MG: 10 INJECTION, SOLUTION EPIDURAL; INFILTRATION; INTRACAUDAL; PERINEURAL at 07:06

## 2017-06-08 NOTE — PROCEDURES
PROCEDURE NOTE:  Bone Marrow Biopsy  Date:6/8/17  Indication: MDS with 5q-  Consent: Informed consent was obtained from patient.  Timeout: Done and documented.  Site: Left posterior illiac crest.  Prep: Betadine.  Needle used: 11 gauge Jamshidi needle.  Anesthetic: 1% lidocaine 5 cc.  Biopsy: The biopsy needle was introduced into the marrow cavity and an aspirate was obtained without complications. Core biopsy obtained and sent for routine histologic examination and cytogenetics.  Complications: None.  Disposition: The patient was discharged home when appropriate per anesthesia.  Minimal blood loss  ROD Carranza NP

## 2017-06-08 NOTE — H&P (VIEW-ONLY)
Subjective:       Patient ID: Susan Puente is a 66 y.o. female.    Chief Complaint: No chief complaint on file.    HPI Comments: Patient presents today for follow up of her MDS 5 q del syndrome. She has been on an allergy desensitization regimen to Revlimid starting with 2.5mg Revlimid once a week followed by 3 times a week and now on week 5 of program currently taking 5 mg daily with concomitant prednisone 10 mg daily. She has tolerated this dose well with faint reticular rash in bilateral lower extremities.   History   Ms. Puente is a 66 year old female with hx of DM2, peripheral vascular disease, tobacco use, CAD, hyperlipidemia, hypertension who was hospitalized 11/10/16 for anemia. hgb 5.3  MCH 43.4 with normal WBC and platelets. Patient had normal iron stores. She was transfused 3 units of PRBCs and discharged home with hgb 8.7 on 11/11/16. She was referred for further evalutation of her anemia. On 12/16/16 patient had a normal SPEP and immunofixation. Slightly elevated kappa light chains with normal ration. Elevated vitamin b12, normal folate, JAYDEN was positive with a low titer and negative profile. Patient had a bone marrow biopsy 1/5/16 which showed the core biopsy is normocellular for age (40%); however, megakaryocytes are increased and show  frequent small, hypolobated forms. Additionally, a subset of the neutrophils are hypogranular. Blasts are not increased by either morphology (1.2%) or in the corresponding flow cytometric analysis. Fish detects a 5q deletion in 54.5% of nuclei. Cytogenetics reported 20 metaphases, 2 metaphases were normal and 18 metaphases had a 5q deletion. No additional cytogenetic abnormalities were detected. Findings consistent with 5q deletion syndrome.     Patient had a delay in obtaining revlimid 10 mg daily but did start taking the medication 2/8/17. On 2/9/17 patient developed a diffuse maculopapular rash throughout scalp arms, legs and torso. She states she  had no stridor or wheezing. She discontinued medication 2/15/17. Went to allergist who provided provided references on desensitization so that patient could resume Revlimid.     Review of Systems   Constitutional: Positive for fatigue. Negative for chills, diaphoresis and fever.   HENT: Negative for congestion and trouble swallowing.    Eyes: Negative for photophobia and visual disturbance.   Respiratory: Negative for cough and shortness of breath.    Cardiovascular: Negative for chest pain and leg swelling.   Gastrointestinal: Negative for abdominal distention and abdominal pain.   Endocrine: Negative for polyphagia and polyuria.   Genitourinary: Negative for dysuria and frequency.   Musculoskeletal: Negative for back pain and gait problem.   Skin: Positive for rash. Negative for color change and pallor.   Neurological: Negative for light-headedness and headaches.   Hematological: Negative for adenopathy. Does not bruise/bleed easily.   Psychiatric/Behavioral: Negative for agitation and behavioral problems.       Objective:      Physical Exam   Constitutional: She is oriented to person, place, and time. She appears well-developed and well-nourished.   HENT:   Mouth/Throat: Oropharynx is clear and moist. No oropharyngeal exudate.   Eyes: Conjunctivae are normal. Pupils are equal, round, and reactive to light.   Neck: Normal range of motion.   Cardiovascular: Normal rate and regular rhythm.    Pulmonary/Chest: Effort normal and breath sounds normal.   Abdominal: Soft. Bowel sounds are normal.   Lymphadenopathy:     She has no cervical adenopathy.   Neurological: She is alert and oriented to person, place, and time.   Skin: Skin is warm.   Psychiatric: She has a normal mood and affect. Her behavior is normal.       Assessment:       1. 5Q minus syndrome        Plan:   Patient has tolerated 5 mg of lenalidomide daily with minimal side effects. Will proceed with 10 mg daily (q 28 day cycle) and cont prednisone 10 mg  daily with eventually slow steroid taper as tolerated.   Lab check in two weeks. RTC in 4 weeks   Blood transfusion today for hgb 6.  Case discussed with Dr. Lucio Contreras MD   Pager [phone]

## 2017-06-08 NOTE — ANESTHESIA PREPROCEDURE EVALUATION
06/08/2017  Susan Puente is a 66 y.o., female.    Pre-op Assessment         Review of Systems    Physical Exam  General:  Obesity    Airway/Jaw/Neck:  Airway Findings: Mouth Opening: Normal Tongue: Normal  General Airway Assessment: Adult  Mallampati: II  Improves to I with phonation.  TM Distance: Normal, at least 6 cm      Dental:  Dental Findings: In tact   Chest/Lungs:  Chest/Lungs Findings: Clear to auscultation, Normal Respiratory Rate     Heart/Vascular:  Heart Findings: Rate: Normal  Rhythm: Regular Rhythm  Sounds: Normal        Mental Status:  Mental Status Findings:  Cooperative, Alert and Oriented         Anesthesia Plan  Type of Anesthesia, risks & benefits discussed:  Anesthesia Type:  general, MAC  Patient's Preference: either  Intra-op Monitoring Plan:   Intra-op Monitoring Plan Comments:   Post Op Pain Control Plan:   Post Op Pain Control Plan Comments:   Induction:    Beta Blocker:  Patient is not currently on a Beta-Blocker (No further documentation required).       Informed Consent: Patient understands risks and agrees with Anesthesia plan.  Questions answered. Anesthesia consent signed with patient.  ASA Score: 2     Day of Surgery Review of History & Physical:    H&P update referred to the surgeon.         Ready For Surgery From Anesthesia Perspective.

## 2017-06-08 NOTE — DISCHARGE INSTRUCTIONS
Discharge instructions for having a Bone Marrow Aspiration / Biopsy    Keep Bandage in place for 24 hours.  - Do not shower or take a tub bath during this time. (you may sponge bathe).  - Call the nurse or physician for excessive bleeding or pain at biopsy site.  - You may take Tylenol as needed for pain.    You have received medication to sedate you.  - Do not drive a car or operate heavy machinery for the rest of the day.  - You may resume other activities as tolerated.    You can call [phone] for any problems during the hours of 8:00 AM-5:00PM.    For an emergency after 5:00 PM you can call [phone] and have the  page the Hematologist / Oncologist on call.

## 2017-06-08 NOTE — INTERVAL H&P NOTE
The patient has been examined and the H&P has been reviewed:    I concur with the findings and no changes have occurred since H&P was written. Okay to proceed with BMBx    Anesthesia/Surgery risks, benefits and alternative options discussed and understood by patient/family.          Active Hospital Problems    Diagnosis  POA    MDS (myelodysplastic syndrome) with 5q deletion [D46.C]  Yes      Resolved Hospital Problems    Diagnosis Date Resolved POA   No resolved problems to display.

## 2017-06-08 NOTE — TRANSFER OF CARE
"Anesthesia Transfer of Care Note    Patient: Susan Puente    Procedure(s) Performed: Procedure(s) (LRB):  BIOPSY-BONE MARROW (Left)    Patient location: PACU    Anesthesia Type: general    Transport from OR: Transported from OR on room air with adequate spontaneous ventilation    Post pain: adequate analgesia    Post assessment: no apparent anesthetic complications and tolerated procedure well    Post vital signs: stable    Level of consciousness: awake    Complications: none    Transfer of care protocol was followed      Last vitals:   Visit Vitals  /60 (BP Location: Left arm, Patient Position: Lying, BP Method: Automatic)   Pulse 70   Temp 36.8 °C (98.3 °F) (Oral)   Resp 16   Ht 5' 5" (1.651 m)   Wt 79.4 kg (175 lb)   SpO2 96%   Breastfeeding? No   BMI 29.12 kg/m²     "

## 2017-06-08 NOTE — ANESTHESIA RELEASE NOTE
"Anesthesia Release from PACU Note    Patient: Susan Puente    Procedure(s) Performed: Procedure(s) (LRB):  BIOPSY-BONE MARROW (Left)    Anesthesia type: general    Post pain: Adequate analgesia    Post assessment: no apparent anesthetic complications    Last Vitals:   Visit Vitals  BP (!) 116/56 (BP Location: Right arm, Patient Position: Lying, BP Method: Automatic)   Pulse 74   Temp 36.6 °C (97.8 °F) (Oral)   Resp 16   Ht 5' 5" (1.651 m)   Wt 79.4 kg (175 lb)   SpO2 98%   Breastfeeding? No   BMI 29.12 kg/m²       Post vital signs: stable    Level of consciousness: awake    Nausea/Vomiting: no nausea/no vomiting    Complications: none    Airway Patency: patent    Respiratory: unassisted    Cardiovascular: stable and blood pressure at baseline    Hydration: euvolemic  "

## 2017-06-08 NOTE — ANESTHESIA POSTPROCEDURE EVALUATION
"Anesthesia Post Evaluation    Patient: Susan Puente    Procedure(s) Performed: Procedure(s) (LRB):  BIOPSY-BONE MARROW (Left)    Final Anesthesia Type: general  Patient location during evaluation: PACU  Patient participation: Yes- Able to Participate  Level of consciousness: awake and alert, oriented and awake  Post-procedure vital signs: reviewed and stable  Pain management: adequate  Airway patency: patent  PONV status at discharge: No PONV  Anesthetic complications: no      Cardiovascular status: blood pressure returned to baseline and hemodynamically stable  Respiratory status: unassisted, spontaneous ventilation and room air  Hydration status: euvolemic  Follow-up not needed.        Visit Vitals  BP (!) 116/56 (BP Location: Right arm, Patient Position: Lying, BP Method: Automatic)   Pulse 74   Temp 36.6 °C (97.8 °F) (Oral)   Resp 16   Ht 5' 5" (1.651 m)   Wt 79.4 kg (175 lb)   SpO2 98%   Breastfeeding? No   BMI 29.12 kg/m²       Pain/Caitlyn Score: Pain Assessment Performed: Yes (6/8/2017  8:20 AM)  Presence of Pain: denies (6/8/2017  8:20 AM)  Caitlyn Score: 10 (6/8/2017  8:20 AM)      "

## 2017-06-08 NOTE — DISCHARGE SUMMARY
[facility]  Hematology/Oncology  Discharge Summary      Patient Name: Susan Puente  MRN: [id number]  Admission Date: 6/8/2017  Hospital Length of Stay: 0 days  Discharge Date and Time:  06/08/2017 6:53 AM  Attending Physician: Gita Valdes MD   Discharging Provider: Soumya Carranza NP  Primary Care Provider: Claudia Rapp MD    HPI: BMBx for MDS    Procedure(s) (LRB):  BIOPSY-BONE MARROW (Left)     Hospital Course: Patient admitted to pre op today for a bone marrow biopsy. Confirmed that consent was done for a bone marrow biopsy. Patient was sedated per anesthesia and a bone marrow biopsy and aspiration was performed in the OR. Patient was then transferred to post op and discharged home when appropriate per anesthesia.       Pending Diagnostic Studies:     Procedure Component Value Units Date/Time    CBC auto differential [[id number]] Collected:  06/08/17 0636    Order Status:  Sent Lab Status:  In process Updated:  06/08/17 0637    Specimen:  Blood from Blood     Comprehensive metabolic panel [[id number]] Collected:  06/08/17 0636    Order Status:  Sent Lab Status:  In process Updated:  06/08/17 0637    Specimen:  Blood from Blood     Hemoglobin A1c [[id number]] Collected:  06/08/17 0636    Order Status:  Sent Lab Status:  In process Updated:  06/08/17 0637    Specimen:  Blood from Blood         Final Active Diagnoses:    Diagnosis Date Noted POA    MDS (myelodysplastic syndrome) with 5q deletion [D46.C] 06/08/2017 Yes      Problems Resolved During this Admission:    Diagnosis Date Noted Date Resolved POA      Discharged Condition: stable    Disposition: Home or Self Care    Follow Up: With Dr. Valdes in BMT clinic    Patient Instructions:     Diet general     Remove dressing in 24 hours     Call MD for:  increased confusion or weakness     Call MD for:  persistent dizziness, light-headedness, or visual disturbances     Call MD for:  worsening rash     Call MD for:  severe persistent  headache     Call MD for:  difficulty breathing or increased cough     Call MD for:  redness, tenderness, or signs of infection (pain, swelling, redness, odor or green/yellow discharge around incision site)     Call MD for:  severe uncontrolled pain     Call MD for:  persistent nausea and vomiting or diarrhea     Call MD for:  temperature >100.4     Activity as tolerated       Medications:  Reconciled Home Medications:   Current Discharge Medication List      CONTINUE these medications which have NOT CHANGED    Details   acetaminophen (TYLENOL ARTHRITIS PAIN) 650 MG TbSR Take 650 mg by mouth every 8 (eight) hours as needed (for pain).      aspirin (ECOTRIN) 81 MG EC tablet Take 81 mg by mouth once daily.      b complex vitamins capsule Take 1 capsule by mouth once daily.      CETIRIZINE HCL (ZYRTEC ORAL) Take by mouth.      fish oil-omega-3 fatty acids 300-1,000 mg capsule Take 4 g by mouth nightly.       lenalidomide 10 mg Cap Take 10 mg by mouth once daily at 6am.  Qty: 28 each, Refills: 5    Associated Diagnoses: 5Q minus syndrome      lisinopril-hydrochlorothiazide (PRINZIDE,ZESTORETIC) 20-12.5 mg per tablet Take 2 tablets by mouth once daily.  Qty: 180 tablet, Refills: 3    Associated Diagnoses: Coronary artery disease, angina presence unspecified, unspecified vessel or lesion type, unspecified whether native or transplanted heart      metformin (GLUCOPHAGE) 500 MG tablet Take 0.5 tablets (250 mg total) by mouth 2 (two) times daily with meals.  Qty: 30 tablet, Refills: 11    Associated Diagnoses: Controlled type 2 diabetes mellitus without complication, without long-term current use of insulin      !! nicotine (NICODERM CQ) 21 mg/24 hr Place 1 patch onto the skin once daily. Generic preferred  Qty: 28 patch, Refills: 0    Associated Diagnoses: Cigarette smoker      !! nicotine (NICODERM CQ) 21 mg/24 hr PLACE 1 PATCH ONTO THE SKIN ONCE DAILY. GENERIC PREFERRED  Qty: 28 patch, Refills: 0    Associated Diagnoses:  Cigarette smoker      NICOTROL 10 mg Crtg INHALE ONE PUFF INTO THE MOUTH AS NEEDED MAXIMUM 6 CARTRIDGES/DAY.  Qty: 168 each, Refills: 0    Associated Diagnoses: Cigarette smoker      predniSONE (DELTASONE) 5 MG tablet TAKE TWO TABLETS BY MOUTH EVERY DAY  Qty: 60 tablet, Refills: 0    Associated Diagnoses: MDS (myelodysplastic syndrome) with 5q deletion      walker Misc 1 each by Misc.(Non-Drug; Combo Route) route once daily at 6am.  Qty: 1 each, Refills: 0    Associated Diagnoses: 5Q minus syndrome       !! - Potential duplicate medications found. Please discuss with provider.          Soumya Carranza NP  Hematology/Oncology  [facility]

## 2017-06-08 NOTE — PLAN OF CARE
Problem: Patient Care Overview  Goal: Plan of Care Review  Outcome: Outcome(s) achieved Date Met: 06/08/17  Discharge instructions given and explained to patient and friend with verbalization of understanding all instructions.  Patients v/s stable, denies n/v and tolerating po, rates pain level tolerable, IV removed, and family at bedside for patient discharge home.

## 2017-06-10 LAB
DNA/RNA EXTRACT AND HOLD RESULT: NORMAL
DNA/RNA EXTRACTION: NORMAL
EXHR SPECIMEN TYPE: NORMAL

## 2017-06-14 ENCOUNTER — TELEPHONE (OUTPATIENT)
Dept: HEMATOLOGY/ONCOLOGY | Facility: CLINIC | Age: 67
End: 2017-06-14

## 2017-06-14 NOTE — TELEPHONE ENCOUNTER
Silver  with [employer] is calling to get an order for a rolling walker.   Contact number [phone] ext [phone]

## 2017-06-14 NOTE — TELEPHONE ENCOUNTER
----- Message from Rory Sotomayor MD sent at 6/14/2017  2:02 PM CDT -----  Contact: Silver  with [employer]   This is not my patient.   ----- Message -----  From: Sonia Longo RN  Sent: 6/14/2017  10:02 AM  To: Rory Sotomayor MD        ----- Message -----  From: Chikis Cormier  Sent: 6/14/2017   9:25 AM  To: Lucio Pelayo Staff    Jorden  mukund Callaway is calling to get an order for a rolling walker.  Contact number [phone] ext [phone]

## 2017-06-15 ENCOUNTER — TELEPHONE (OUTPATIENT)
Dept: HEMATOLOGY/ONCOLOGY | Facility: CLINIC | Age: 67
End: 2017-06-15

## 2017-06-15 ENCOUNTER — OFFICE VISIT (OUTPATIENT)
Dept: HEMATOLOGY/ONCOLOGY | Facility: CLINIC | Age: 67
End: 2017-06-15
Payer: MEDICARE

## 2017-06-15 ENCOUNTER — PATIENT MESSAGE (OUTPATIENT)
Dept: HEMATOLOGY/ONCOLOGY | Facility: CLINIC | Age: 67
End: 2017-06-15

## 2017-06-15 ENCOUNTER — INFUSION (OUTPATIENT)
Dept: INFUSION THERAPY | Facility: HOSPITAL | Age: 67
End: 2017-06-15
Attending: INTERNAL MEDICINE
Payer: MEDICARE

## 2017-06-15 VITALS
BODY MASS INDEX: 31.48 KG/M2 | HEIGHT: 65 IN | HEART RATE: 95 BPM | SYSTOLIC BLOOD PRESSURE: 99 MMHG | DIASTOLIC BLOOD PRESSURE: 44 MMHG | WEIGHT: 188.94 LBS | RESPIRATION RATE: 20 BRPM | OXYGEN SATURATION: 97 % | TEMPERATURE: 99 F

## 2017-06-15 VITALS
DIASTOLIC BLOOD PRESSURE: 61 MMHG | TEMPERATURE: 98 F | HEART RATE: 84 BPM | SYSTOLIC BLOOD PRESSURE: 136 MMHG | RESPIRATION RATE: 17 BRPM

## 2017-06-15 DIAGNOSIS — G60.3 IDIOPATHIC PROGRESSIVE NEUROPATHY: ICD-10-CM

## 2017-06-15 DIAGNOSIS — D64.9 ANEMIA, UNSPECIFIED TYPE: Primary | ICD-10-CM

## 2017-06-15 DIAGNOSIS — D61.818 PANCYTOPENIA: ICD-10-CM

## 2017-06-15 DIAGNOSIS — Z11.4 ENCOUNTER FOR SCREENING FOR HIV: ICD-10-CM

## 2017-06-15 DIAGNOSIS — D64.9 ANEMIA, UNSPECIFIED TYPE: ICD-10-CM

## 2017-06-15 LAB
CHROM BANDING METHOD: NORMAL
CHROMOSOME ANALYSIS BM ADDITIONAL INFORMATION: NORMAL
CHROMOSOME ANALYSIS BM RELEASED BY: NORMAL
CHROMOSOME ANALYSIS BM RESULT SUMMARY: NORMAL
CLINICAL CYTOGENETICIST REVIEW: NORMAL
KARYOTYP MAR: NORMAL
REASON FOR REFERRAL (NARRATIVE): NORMAL
REF LAB TEST METHOD: NORMAL
SPECIMEN SOURCE: NORMAL
SPECIMEN: NORMAL

## 2017-06-15 PROCEDURE — 99499 UNLISTED E&M SERVICE: CPT | Mod: S$GLB,,, | Performed by: INTERNAL MEDICINE

## 2017-06-15 PROCEDURE — 99215 OFFICE O/P EST HI 40 MIN: CPT | Mod: S$GLB,,, | Performed by: INTERNAL MEDICINE

## 2017-06-15 PROCEDURE — P9040 RBC LEUKOREDUCED IRRADIATED: HCPCS

## 2017-06-15 PROCEDURE — 25000003 PHARM REV CODE 250: Performed by: INTERNAL MEDICINE

## 2017-06-15 PROCEDURE — 1159F MED LIST DOCD IN RCRD: CPT | Mod: S$GLB,,, | Performed by: INTERNAL MEDICINE

## 2017-06-15 PROCEDURE — 99999 PR PBB SHADOW E&M-EST. PATIENT-LVL IV: CPT | Mod: PBBFAC,,, | Performed by: INTERNAL MEDICINE

## 2017-06-15 PROCEDURE — 1126F AMNT PAIN NOTED NONE PRSNT: CPT | Mod: S$GLB,,, | Performed by: INTERNAL MEDICINE

## 2017-06-15 PROCEDURE — 86920 COMPATIBILITY TEST SPIN: CPT

## 2017-06-15 PROCEDURE — 36430 TRANSFUSION BLD/BLD COMPNT: CPT

## 2017-06-15 RX ORDER — CIPROFLOXACIN 500 MG/1
500 TABLET ORAL 2 TIMES DAILY
Qty: 60 TABLET | Refills: 3 | Status: SHIPPED | OUTPATIENT
Start: 2017-06-15 | End: 2017-07-15

## 2017-06-15 RX ORDER — PREDNISONE 1 MG/1
1 TABLET ORAL DAILY
Qty: 30 TABLET | Refills: 0 | Status: SHIPPED | OUTPATIENT
Start: 2017-06-15 | End: 2017-12-27

## 2017-06-15 RX ORDER — ACETAMINOPHEN 325 MG/1
650 TABLET ORAL
Status: COMPLETED | OUTPATIENT
Start: 2017-06-15 | End: 2017-06-15

## 2017-06-15 RX ORDER — DIPHENHYDRAMINE HCL 25 MG
25 CAPSULE ORAL
Status: COMPLETED | OUTPATIENT
Start: 2017-06-15 | End: 2017-06-15

## 2017-06-15 RX ORDER — SODIUM CHLORIDE 9 MG/ML
INJECTION, SOLUTION INTRAVENOUS CONTINUOUS
Status: DISCONTINUED | OUTPATIENT
Start: 2017-06-15 | End: 2017-06-15 | Stop reason: HOSPADM

## 2017-06-15 RX ADMIN — SODIUM CHLORIDE: 0.9 INJECTION, SOLUTION INTRAVENOUS at 04:06

## 2017-06-15 RX ADMIN — ACETAMINOPHEN 650 MG: 325 TABLET ORAL at 04:06

## 2017-06-15 RX ADMIN — DIPHENHYDRAMINE HYDROCHLORIDE 25 MG: 25 CAPSULE ORAL at 04:06

## 2017-06-15 NOTE — PLAN OF CARE
Problem: Patient Care Overview  Goal: Plan of Care Review  Outcome: Ongoing (interventions implemented as appropriate)  Tolerated blood well. Rtc tomorrow for 2nd unit. Appointment time given. Verbalized understanding.

## 2017-06-15 NOTE — PROGRESS NOTES
Subjective:       Patient ID: Susan Puente is a 66 y.o. female.    Chief Complaint: Follow-up (myelodysplastic syndrome)    Patient presents today for follow up of her MDS 5 q del syndrome. She has been on an allergy desensitization regimen to Revlimid starting with 2.5mg Revlimid once a week followed by 3 times a week followed by 5 mg daily then 10 mg with concomitant prednisone 10 mg daily. Patient has been on her goal dose of 10 mg daily for 4 weeks along with prednisone 10 mg daily (started 5/18) and has experienced no hypersensitivity issues. Patient has not experienced revlimid associated diarrhea as she had previously. In fact she is constipated. Throughout the last four weeks, Ms. Puente has been significantly anemia and has progressive leukopenia and thrombocytopenia. A repeat bone marrow biopsy was performed 6/8/17 and showed a hypercellular marrow (60%) continued atypia in the granulocytes and megakaryocytes were noted.  Additionally, there is erythroid atypia present. No increase in blasts. Cytogenetics are pending. Today's hgb 4.9 plt count 28K and WBC 2.32 with 26% neutrophils. She feels extremely fatigued. Can hardly dress herself. Today she presents with her neighbor who has been looking after her closely as patient lives alone.     History   Ms. Puente is a 66 year old female with hx of DM2, peripheral vascular disease, tobacco use, CAD, hyperlipidemia, hypertension who was hospitalized 11/10/16 for anemia. hgb 5.3  MCH 43.4 with normal WBC and platelets. Patient had normal iron stores. She was transfused 3 units of PRBCs and discharged home with hgb 8.7 on 11/11/16. She was referred for further evalutation of her anemia. On 12/16/16 patient had a normal SPEP and immunofixation. Slightly elevated kappa light chains with normal ration. Elevated vitamin b12, normal folate, JAYDEN was positive with a low titer and negative profile. Patient had a bone marrow biopsy 1/5/16 which showed  the core biopsy is normocellular for age (40%); however, megakaryocytes are increased and show  frequent small, hypolobated forms. Additionally, a subset of the neutrophils are hypogranular. Blasts are not increased by either morphology (1.2%) or in the corresponding flow cytometric analysis. Fish detects a 5q deletion in 54.5% of nuclei. Cytogenetics reported 20 metaphases, 2 metaphases were normal and 18 metaphases had a 5q deletion. No additional cytogenetic abnormalities were detected. Findings consistent with 5q deletion syndrome.     Patient had a delay in obtaining revlimid 10 mg daily but did start taking the medication 2/8/17. On 2/9/17 patient developed a diffuse maculopapular rash throughout scalp arms, legs and torso. She states she had no stridor or wheezing. She discontinued medication 2/15/17. Went to allergist who provided references on desensitization so that patient could resume Revlimid.       Review of Systems   Constitutional: Positive for fatigue. Negative for chills, diaphoresis and fever.   HENT: Positive for dental problem. Negative for trouble swallowing.    Eyes: Negative for photophobia and visual disturbance.   Respiratory: Positive for shortness of breath. Negative for cough.    Cardiovascular: Negative for chest pain and leg swelling.   Gastrointestinal: Positive for constipation. Negative for abdominal distention and abdominal pain.   Endocrine: Negative for polyphagia and polyuria.   Genitourinary: Negative for dysuria and hematuria.   Musculoskeletal: Negative for arthralgias and back pain.   Skin: Positive for pallor. Negative for color change.   Neurological: Negative for light-headedness and numbness.   Hematological: Negative for adenopathy. Does not bruise/bleed easily.   Psychiatric/Behavioral: Negative for agitation and behavioral problems.       Objective:      Physical Exam   Constitutional: She is oriented to person, place, and time. She appears well-developed and  well-nourished.   HENT:   Mouth/Throat: Oropharynx is clear and moist.   Eyes: Conjunctivae are normal. Pupils are equal, round, and reactive to light.   Neck: Normal range of motion.   Cardiovascular: Normal rate and regular rhythm.    Pulmonary/Chest: Effort normal and breath sounds normal.   Abdominal: Soft. Bowel sounds are normal.   Musculoskeletal: She exhibits no edema.   Lymphadenopathy:     She has no cervical adenopathy.   Neurological: She is alert and oriented to person, place, and time.   Skin: Skin is warm and dry. There is pallor.   Psychiatric: She has a normal mood and affect. Her behavior is normal.       Assessment:       1. Anemia, unspecified type    2. Pancytopenia    3. Idiopathic progressive neuropathy     4. Encounter for screening for HIV         Plan:    Will transfuse 2 units of PRBC given her profound symptomatic anemia.    New pancytopenia, not characteristic of her 5q minus syndrome has not yet been fully explained by recent bone marrow biopsy however cytogenetics and fish panel are pending. It is possible patient has acquired a new genetic mutation which does not respond to lenalidomide while lenalidomide itself can cause cytopenias. While further analysis is being processed, will evaluate for other etiologies of pancytopenia including vitamin b12 deficiency, viral infection (HIV, hepatitis, CMV, Parvo). Will check a hemolytic panel as well.   Will also consider the possibility that the effect of lenalidomide has been impeded by patient's concurrent treatment with prednisone so will continue lenalidomide while tapering down prednisone given she has had no recurrent hypersensitivity to the medication.   Patient will have labs repeated in one week, transfuse prn. By that time, cytogenetics and fish panel will have resulted. Will discuss results then.     Patient seen with Dr. Lucio Contreras MD   Pager [phone]

## 2017-06-15 NOTE — Clinical Note
Patient needs weekly cbc and type and screen for the next 4 weeks. Will see her in clinic in one week. Please reserve a chair for her to get transfused. We discussed labs and appt for Thursday

## 2017-06-16 ENCOUNTER — INFUSION (OUTPATIENT)
Dept: INFUSION THERAPY | Facility: HOSPITAL | Age: 67
End: 2017-06-16
Attending: INTERNAL MEDICINE
Payer: MEDICARE

## 2017-06-16 VITALS
OXYGEN SATURATION: 97 % | RESPIRATION RATE: 18 BRPM | WEIGHT: 180 LBS | HEART RATE: 67 BPM | HEIGHT: 65 IN | BODY MASS INDEX: 29.99 KG/M2 | DIASTOLIC BLOOD PRESSURE: 67 MMHG | SYSTOLIC BLOOD PRESSURE: 153 MMHG | TEMPERATURE: 99 F

## 2017-06-16 DIAGNOSIS — D64.9 ANEMIA, UNSPECIFIED TYPE: ICD-10-CM

## 2017-06-16 DIAGNOSIS — Z51.11 ENCOUNTER FOR ANTINEOPLASTIC CHEMOTHERAPY: ICD-10-CM

## 2017-06-16 DIAGNOSIS — D64.9 ANEMIA: Primary | ICD-10-CM

## 2017-06-16 DIAGNOSIS — D61.818 PANCYTOPENIA: ICD-10-CM

## 2017-06-16 DIAGNOSIS — D64.9 ANEMIA, UNSPECIFIED: Primary | ICD-10-CM

## 2017-06-16 DIAGNOSIS — D46.C MYELODYSPLASTIC SYNDROME WITH 5Q DELETION: ICD-10-CM

## 2017-06-16 DIAGNOSIS — Z11.4 ENCOUNTER FOR SCREENING FOR HIV: ICD-10-CM

## 2017-06-16 PROCEDURE — P9040 RBC LEUKOREDUCED IRRADIATED: HCPCS

## 2017-06-16 PROCEDURE — 86644 CMV ANTIBODY: CPT

## 2017-06-16 PROCEDURE — 36430 TRANSFUSION BLD/BLD COMPNT: CPT

## 2017-06-16 PROCEDURE — 25000003 PHARM REV CODE 250: Performed by: INTERNAL MEDICINE

## 2017-06-16 RX ORDER — ACETAMINOPHEN 325 MG/1
650 TABLET ORAL ONCE
Status: COMPLETED | OUTPATIENT
Start: 2017-06-16 | End: 2017-06-16

## 2017-06-16 RX ORDER — HYDROCODONE BITARTRATE AND ACETAMINOPHEN 500; 5 MG/1; MG/1
TABLET ORAL ONCE
Status: COMPLETED | OUTPATIENT
Start: 2017-06-16 | End: 2017-06-16

## 2017-06-16 RX ORDER — DIPHENHYDRAMINE HCL 25 MG
25 CAPSULE ORAL
Status: COMPLETED | OUTPATIENT
Start: 2017-06-16 | End: 2017-06-16

## 2017-06-16 RX ADMIN — DIPHENHYDRAMINE HYDROCHLORIDE 25 MG: 25 CAPSULE ORAL at 10:06

## 2017-06-16 RX ADMIN — ACETAMINOPHEN 650 MG: 325 TABLET ORAL at 10:06

## 2017-06-16 RX ADMIN — SODIUM CHLORIDE: 0.9 INJECTION, SOLUTION INTRAVENOUS at 10:06

## 2017-06-16 NOTE — PROGRESS NOTES
Patient, Lisandra Puente (MRN #[id number]), presented with a recent Platelet count less than 150 K/uL consistent with the definition of thrombocytopenia (ICD10 - D69.6).    Platelets   Date Value Ref Range Status   06/15/2017 28 (LL) 150 - 350 K/uL Final     Comment:     critical result(s) called and verbal readback obtained from   PLATELET COUNT CALLED TO LISANDRA HIGH RN, 06/15/2017 14:47       The patient's thrombocytopenia was monitored, evaluated, addressed and/or treated. This addendum to the medical record is made on 06/16/2017.

## 2017-06-16 NOTE — PLAN OF CARE
Problem: Patient Care Overview  Goal: Plan of Care Review  Outcome: Ongoing (interventions implemented as appropriate)  Pt arrived for 1unit PRBCs, PIV accessed X 1 attempt w/o difficulty, Pt tolerated transfusion very well. PIV D/C'd w/o difficulty. Pt D/C'd home via W/C with family & instructions

## 2017-06-17 PROCEDURE — 86644 CMV ANTIBODY: CPT

## 2017-06-19 ENCOUNTER — TELEPHONE (OUTPATIENT)
Dept: HEMATOLOGY/ONCOLOGY | Facility: CLINIC | Age: 67
End: 2017-06-19

## 2017-06-19 ENCOUNTER — PATIENT MESSAGE (OUTPATIENT)
Dept: HEMATOLOGY/ONCOLOGY | Facility: CLINIC | Age: 67
End: 2017-06-19

## 2017-06-19 LAB
CLINICAL CYTOGENETICIST REVIEW: NORMAL
FMDS SPECIMEN: NORMAL
MDS FISH ADDITIONAL INFORMATION: NORMAL
MDS FISH DISCLAIMER: NORMAL
MDS FISH REASON FOR REFERRAL (BM): NORMAL
MDS FISH RELEASED BY: NORMAL
MDS FISH RESULT (BM): NORMAL
MDS FISH RESULT SUMMARY: NORMAL
MDS FISH RESULT TABLE: NORMAL
REF LAB TEST METHOD: NORMAL
SPECIMEN SOURCE: NORMAL

## 2017-06-20 DIAGNOSIS — D61.818 PANCYTOPENIA: Primary | ICD-10-CM

## 2017-06-22 ENCOUNTER — LAB VISIT (OUTPATIENT)
Dept: LAB | Facility: HOSPITAL | Age: 67
End: 2017-06-22
Attending: INTERNAL MEDICINE
Payer: MEDICARE

## 2017-06-22 ENCOUNTER — OFFICE VISIT (OUTPATIENT)
Dept: HEMATOLOGY/ONCOLOGY | Facility: CLINIC | Age: 67
End: 2017-06-22
Payer: MEDICARE

## 2017-06-22 ENCOUNTER — TELEPHONE (OUTPATIENT)
Dept: HEMATOLOGY/ONCOLOGY | Facility: CLINIC | Age: 67
End: 2017-06-22

## 2017-06-22 ENCOUNTER — INFUSION (OUTPATIENT)
Dept: INFUSION THERAPY | Facility: HOSPITAL | Age: 67
End: 2017-06-22
Attending: INTERNAL MEDICINE
Payer: MEDICARE

## 2017-06-22 VITALS
DIASTOLIC BLOOD PRESSURE: 73 MMHG | WEIGHT: 190.5 LBS | HEART RATE: 84 BPM | SYSTOLIC BLOOD PRESSURE: 175 MMHG | TEMPERATURE: 98 F | BODY MASS INDEX: 31.74 KG/M2 | HEIGHT: 65 IN

## 2017-06-22 VITALS
SYSTOLIC BLOOD PRESSURE: 148 MMHG | TEMPERATURE: 98 F | HEART RATE: 84 BPM | RESPIRATION RATE: 18 BRPM | DIASTOLIC BLOOD PRESSURE: 72 MMHG

## 2017-06-22 DIAGNOSIS — D64.9 ANEMIA, UNSPECIFIED TYPE: ICD-10-CM

## 2017-06-22 DIAGNOSIS — D61.818 PANCYTOPENIA: ICD-10-CM

## 2017-06-22 DIAGNOSIS — D64.9 ANEMIA, UNSPECIFIED TYPE: Primary | ICD-10-CM

## 2017-06-22 LAB
ABO + RH BLD: NORMAL
ANISOCYTOSIS BLD QL SMEAR: SLIGHT
BASOPHILS # BLD AUTO: 0 K/UL
BASOPHILS NFR BLD: 0 %
BLD GP AB SCN CELLS X3 SERPL QL: NORMAL
DIFFERENTIAL METHOD: ABNORMAL
EOSINOPHIL # BLD AUTO: 0 K/UL
EOSINOPHIL NFR BLD: 1.7 %
ERYTHROCYTE [DISTWIDTH] IN BLOOD BY AUTOMATED COUNT: 25.7 %
HCT VFR BLD AUTO: 15.7 %
HGB BLD-MCNC: 5.3 G/DL
LYMPHOCYTES # BLD AUTO: 1.5 K/UL
LYMPHOCYTES NFR BLD: 61.7 %
MCH RBC QN AUTO: 35.6 PG
MCHC RBC AUTO-ENTMCNC: 33.8 %
MCV RBC AUTO: 105 FL
MONOCYTES # BLD AUTO: 0.2 K/UL
MONOCYTES NFR BLD: 8.5 %
NEUTROPHILS # BLD AUTO: 0.7 K/UL
NEUTROPHILS NFR BLD: 28.1 %
OVALOCYTES BLD QL SMEAR: ABNORMAL
PLATELET # BLD AUTO: 30 K/UL
PLATELET BLD QL SMEAR: ABNORMAL
PMV BLD AUTO: 10.9 FL
POIKILOCYTOSIS BLD QL SMEAR: SLIGHT
POLYCHROMASIA BLD QL SMEAR: ABNORMAL
RBC # BLD AUTO: 1.49 M/UL
WBC # BLD AUTO: 2.35 K/UL

## 2017-06-22 PROCEDURE — 86920 COMPATIBILITY TEST SPIN: CPT

## 2017-06-22 PROCEDURE — 1159F MED LIST DOCD IN RCRD: CPT | Mod: S$GLB,,, | Performed by: INTERNAL MEDICINE

## 2017-06-22 PROCEDURE — P9040 RBC LEUKOREDUCED IRRADIATED: HCPCS

## 2017-06-22 PROCEDURE — 88184 FLOWCYTOMETRY/ TC 1 MARKER: CPT

## 2017-06-22 PROCEDURE — 99215 OFFICE O/P EST HI 40 MIN: CPT | Mod: S$GLB,,, | Performed by: INTERNAL MEDICINE

## 2017-06-22 PROCEDURE — 88188 FLOWCYTOMETRY/READ 9-15: CPT

## 2017-06-22 PROCEDURE — 86900 BLOOD TYPING SEROLOGIC ABO: CPT

## 2017-06-22 PROCEDURE — 1126F AMNT PAIN NOTED NONE PRSNT: CPT | Mod: S$GLB,,, | Performed by: INTERNAL MEDICINE

## 2017-06-22 PROCEDURE — 99999 PR PBB SHADOW E&M-EST. PATIENT-LVL IV: CPT | Mod: PBBFAC,,, | Performed by: INTERNAL MEDICINE

## 2017-06-22 PROCEDURE — 36430 TRANSFUSION BLD/BLD COMPNT: CPT

## 2017-06-22 PROCEDURE — 85025 COMPLETE CBC W/AUTO DIFF WBC: CPT

## 2017-06-22 PROCEDURE — 86850 RBC ANTIBODY SCREEN: CPT

## 2017-06-22 PROCEDURE — 36415 COLL VENOUS BLD VENIPUNCTURE: CPT

## 2017-06-22 RX ORDER — HYDROCODONE BITARTRATE AND ACETAMINOPHEN 500; 5 MG/1; MG/1
TABLET ORAL ONCE
Status: DISCONTINUED | OUTPATIENT
Start: 2017-06-22 | End: 2022-01-01

## 2017-06-22 RX ORDER — DIPHENHYDRAMINE HCL 25 MG
25 CAPSULE ORAL
Status: CANCELLED | OUTPATIENT
Start: 2017-06-22

## 2017-06-22 RX ORDER — ACETAMINOPHEN 325 MG/1
650 TABLET ORAL
Status: CANCELLED | OUTPATIENT
Start: 2017-06-22

## 2017-06-22 RX ORDER — HYDROCODONE BITARTRATE AND ACETAMINOPHEN 500; 5 MG/1; MG/1
TABLET ORAL ONCE
Status: DISCONTINUED | OUTPATIENT
Start: 2017-06-22 | End: 2017-06-22 | Stop reason: HOSPADM

## 2017-06-22 RX ORDER — HYDROCODONE BITARTRATE AND ACETAMINOPHEN 500; 5 MG/1; MG/1
TABLET ORAL ONCE
Status: CANCELLED | OUTPATIENT
Start: 2017-06-22 | End: 2017-06-22

## 2017-06-22 RX ORDER — DIPHENHYDRAMINE HCL 25 MG
25 CAPSULE ORAL
Status: DISCONTINUED | OUTPATIENT
Start: 2017-06-22 | End: 2017-06-22 | Stop reason: HOSPADM

## 2017-06-22 RX ORDER — ACETAMINOPHEN 325 MG/1
650 TABLET ORAL
Status: DISCONTINUED | OUTPATIENT
Start: 2017-06-22 | End: 2017-06-22 | Stop reason: HOSPADM

## 2017-06-22 NOTE — PLAN OF CARE
Problem: Patient Care Overview  Goal: Individualization & Mutuality  8000-Labs , hx, and medications reviewed, patient here for 1 of 2 units of blood. Assessment completed. Discussed plan of care with patient. Patient in agreement. Chair reclined and warm blanket and snack offered.

## 2017-06-22 NOTE — Clinical Note
Patient needs to cont with weekly cbc with type and screen and reservation for blood trasnfusion. Needs CT of chest abdomen and pelvis asap. Follow up with me and andreea after CT scan (has been coming in thrusday

## 2017-06-23 ENCOUNTER — INFUSION (OUTPATIENT)
Dept: INFUSION THERAPY | Facility: HOSPITAL | Age: 67
End: 2017-06-23
Attending: INTERNAL MEDICINE
Payer: MEDICARE

## 2017-06-23 ENCOUNTER — DOCUMENTATION ONLY (OUTPATIENT)
Dept: HEMATOLOGY/ONCOLOGY | Facility: CLINIC | Age: 67
End: 2017-06-23

## 2017-06-23 VITALS
TEMPERATURE: 99 F | SYSTOLIC BLOOD PRESSURE: 149 MMHG | HEART RATE: 78 BPM | RESPIRATION RATE: 18 BRPM | DIASTOLIC BLOOD PRESSURE: 68 MMHG

## 2017-06-23 DIAGNOSIS — D64.9 ANEMIA, UNSPECIFIED TYPE: ICD-10-CM

## 2017-06-23 PROCEDURE — 86920 COMPATIBILITY TEST SPIN: CPT

## 2017-06-23 PROCEDURE — 36430 TRANSFUSION BLD/BLD COMPNT: CPT

## 2017-06-23 PROCEDURE — P9040 RBC LEUKOREDUCED IRRADIATED: HCPCS

## 2017-06-23 PROCEDURE — 86644 CMV ANTIBODY: CPT

## 2017-06-23 PROCEDURE — 25000003 PHARM REV CODE 250: Performed by: INTERNAL MEDICINE

## 2017-06-23 RX ORDER — ACETAMINOPHEN 325 MG/1
650 TABLET ORAL
Status: COMPLETED | OUTPATIENT
Start: 2017-06-23 | End: 2017-06-23

## 2017-06-23 RX ORDER — HYDROCODONE BITARTRATE AND ACETAMINOPHEN 500; 5 MG/1; MG/1
TABLET ORAL ONCE
Status: COMPLETED | OUTPATIENT
Start: 2017-06-23 | End: 2017-06-23

## 2017-06-23 RX ORDER — ACETAMINOPHEN 325 MG/1
650 TABLET ORAL
Status: CANCELLED | OUTPATIENT
Start: 2017-06-23

## 2017-06-23 RX ORDER — HYDROCODONE BITARTRATE AND ACETAMINOPHEN 500; 5 MG/1; MG/1
TABLET ORAL ONCE
Status: CANCELLED | OUTPATIENT
Start: 2017-06-23 | End: 2017-06-23

## 2017-06-23 RX ORDER — DIPHENHYDRAMINE HCL 25 MG
25 CAPSULE ORAL
Status: CANCELLED | OUTPATIENT
Start: 2017-06-23

## 2017-06-23 RX ORDER — DIPHENHYDRAMINE HCL 25 MG
25 CAPSULE ORAL
Status: COMPLETED | OUTPATIENT
Start: 2017-06-23 | End: 2017-06-23

## 2017-06-23 RX ADMIN — ACETAMINOPHEN 650 MG: 325 TABLET ORAL at 02:06

## 2017-06-23 RX ADMIN — SODIUM CHLORIDE: 0.9 INJECTION, SOLUTION INTRAVENOUS at 02:06

## 2017-06-23 RX ADMIN — DIPHENHYDRAMINE HYDROCHLORIDE 25 MG: 25 CAPSULE ORAL at 02:06

## 2017-06-23 NOTE — PLAN OF CARE
Problem: Patient Care Overview  Goal: Plan of Care Review  Outcome: Ongoing (interventions implemented as appropriate)  Pt. Tolerated prbc. VSS. Peripheral IV Flushed and saline locked. Secured with coban, will leave in place for tomorrows transfusion. No questions at this time.

## 2017-06-23 NOTE — PLAN OF CARE
Problem: Patient Care Overview  Goal: Discharge Needs Assessment  Outcome: Ongoing (interventions implemented as appropriate)  Patient tolerated treatment well. Discharged without complaints or S/S of adverse event. AVS given.  Instructed to call provider for any questions or concerns.

## 2017-06-23 NOTE — PLAN OF CARE
Problem: Patient Care Overview  Goal: Individualization & Mutuality  Outcome: Ongoing (interventions implemented as appropriate)  1450-Labs , hx, and medications reviewed. Assessment completed. Discussed plan of care with patient. Patient in agreement. Chair reclined and warm blanket and snack offered.

## 2017-06-23 NOTE — PROGRESS NOTES
Consult received to arrange for Rollator for home use. Referral for Rollator made to [employer] ([phone]). Spoke with Marian, all necessary information given and orders faxed to the office ([phone]). Equipment to be delivered to pts. Home. Pt. Aware of the plan. Will follow.

## 2017-06-23 NOTE — PROGRESS NOTES
Subjective:       Patient ID: Susan Puente is a 66 y.o. female.    Chief Complaint: No chief complaint on file.    Patient presents today for follow up of her MDS 5 q del syndrome. She had resumed relvimid 10 mg daily after concurrent desensitization with prednisone. Unfortunately developed progressive pancytopenia. Due to this, she had a repeat BMBX  performed 6/8/17 and showed a hypercellular marrow (60%) continued atypia in the granulocytes and megakaryocytes were noted.  Additionally, there is erythroid atypia present. No increase in blasts. Cytogenetics are normal and MDS FISH is negative, failing to show 5 q minus. Anemia work up revealed bienvenido negative hemolysis. Revlimid has been held for one week and she is tapering off steroid. Last week hgb 4.9 plt count 28K and WBC 2.32 with 26% neutrophils. She was transfused 2 units of PRBC and started on ppx cipro. Today hgb 5.3, plt count 30K and WBC 2.35 with 28% neutrophils. She feels extremely fatigued.    History   Ms. Puente is a 66 year old female with hx of DM2, peripheral vascular disease, tobacco use, CAD, hyperlipidemia, hypertension who was hospitalized 11/10/16 for anemia. hgb 5.3  MCH 43.4 with normal WBC and platelets. Patient had normal iron stores. She was transfused 3 units of PRBCs and discharged home with hgb 8.7 on 11/11/16. She was referred for further evalutation of her anemia. On 12/16/16 patient had a normal SPEP and immunofixation. Slightly elevated kappa light chains with normal ration. Elevated vitamin b12, normal folate, JAYDEN was positive with a low titer and negative profile. Patient had a bone marrow biopsy 1/5/16 which showed the core biopsy is normocellular for age (40%); however, megakaryocytes are increased and show  frequent small, hypolobated forms. Additionally, a subset of the neutrophils are hypogranular. Blasts are not increased by either morphology (1.2%) or in the corresponding flow cytometric analysis. Fish  detects a 5q deletion in 54.5% of nuclei. Cytogenetics reported 20 metaphases, 2 metaphases were normal and 18 metaphases had a 5q deletion. No additional cytogenetic abnormalities were detected. Findings consistent with 5q deletion syndrome.     Patient had a delay in obtaining revlimid 10 mg daily but did start taking the medication 2/8/17. On 2/9/17 patient developed a diffuse maculopapular rash throughout scalp arms, legs and torso. She states she had no stridor or wheezing. She discontinued medication 2/15/17. Went to allergist who provided references on desensitization so that patient could resume Revlimid. Unfortunately developed pancytopenia and Revlimid stopped 6/16/17.       Review of Systems   Constitutional: Positive for fatigue. Negative for diaphoresis.   HENT: Negative for mouth sores and nosebleeds.    Eyes: Negative for photophobia and visual disturbance.   Respiratory: Negative for cough and shortness of breath.    Cardiovascular: Negative for chest pain and leg swelling.   Gastrointestinal: Negative for abdominal distention and abdominal pain.   Genitourinary: Negative for dysuria and hematuria.   Musculoskeletal: Negative for gait problem and joint swelling.   Skin: Positive for pallor. Negative for color change.   Neurological: Negative for light-headedness and numbness.   Hematological: Negative for adenopathy. Does not bruise/bleed easily.   Psychiatric/Behavioral: Negative for agitation and behavioral problems.       Objective:      Physical Exam   Constitutional: She is oriented to person, place, and time. She appears well-developed and well-nourished.   HENT:   Mouth/Throat: Oropharynx is clear and moist.   Eyes: Pupils are equal, round, and reactive to light.   pale   Cardiovascular: Normal rate and regular rhythm.    Pulmonary/Chest: Effort normal and breath sounds normal.   Abdominal: Soft. Bowel sounds are normal.   Lymphadenopathy:     She has no cervical adenopathy.   Neurological:  She is alert and oriented to person, place, and time.   Skin: Skin is warm and dry.   Psychiatric: She has a normal mood and affect. Her behavior is normal.       Assessment:       1. Anemia, unspecified type        Plan:   Patient no longer has 5q minus. Her recent BM does reveal findings of MDS and she has hemolysis with pancytopenia. Blood work for [facility] was sent today. She will receive 2 units of PRBC. Will plan to sim scan to rule out any concurrent malignancy contributing to clinical picture. Cont to obtain weekly labs. Follow up with CT scan results.     Case discussed with Dr. Valdes.     Jacinda Contreras MD   Pager [phone]

## 2017-06-24 LAB
FLOW CYTOMETRY SPECIALIST REVIEW: NORMAL
PNH GRANULOCYTES: 0 % (ref 0–0.01)
PNH MONOCYTES: 0.01 % (ref 0–0.05)
PNH RBC-COMPLETE AG LOSS: 0 % (ref 0–0.01)
PNH RBC-PARTIAL AG LOSS: 0 % (ref 0–0.99)

## 2017-06-24 PROCEDURE — 86644 CMV ANTIBODY: CPT

## 2017-06-27 ENCOUNTER — TELEPHONE (OUTPATIENT)
Dept: HEMATOLOGY/ONCOLOGY | Facility: CLINIC | Age: 67
End: 2017-06-27

## 2017-06-27 NOTE — TELEPHONE ENCOUNTER
----- Message from Marzena Pérez sent at 6/27/2017  9:54 AM CDT -----  Contact: Lisa with [facility]  [facility] would like to know if the pt will continue on Revlimid? They were informed by the pt that she is no longer taking this medication, they need to clarify     Contact number [phone] option 1 then 5  Thanks

## 2017-06-28 ENCOUNTER — TELEPHONE (OUTPATIENT)
Dept: HEMATOLOGY/ONCOLOGY | Facility: CLINIC | Age: 67
End: 2017-06-28

## 2017-06-28 NOTE — TELEPHONE ENCOUNTER
----- Message from Gabo Sim sent at 6/28/2017 10:29 AM CDT -----  Contact: Self  Patient needs update on PA for CT scan scheduled.    Patient also has insurance phone number to possibly obtain Pa.    [employer]  [phone]    Please contact patient:  [phone]

## 2017-06-28 NOTE — TELEPHONE ENCOUNTER
Returned call to patient. Informed her that the auth was still pending for CT and that we will let her know if there is a problem with getting it authorized.

## 2017-06-30 ENCOUNTER — LAB VISIT (OUTPATIENT)
Dept: LAB | Facility: HOSPITAL | Age: 67
End: 2017-06-30
Attending: INTERNAL MEDICINE
Payer: MEDICARE

## 2017-06-30 ENCOUNTER — TELEPHONE (OUTPATIENT)
Dept: RADIOLOGY | Facility: HOSPITAL | Age: 67
End: 2017-06-30

## 2017-06-30 ENCOUNTER — DOCUMENTATION ONLY (OUTPATIENT)
Dept: HEMATOLOGY/ONCOLOGY | Facility: CLINIC | Age: 67
End: 2017-06-30

## 2017-06-30 DIAGNOSIS — D64.9 ANEMIA, UNSPECIFIED TYPE: ICD-10-CM

## 2017-06-30 LAB
ABO + RH BLD: NORMAL
ANISOCYTOSIS BLD QL SMEAR: SLIGHT
BASOPHILS # BLD AUTO: 0 K/UL
BASOPHILS NFR BLD: 0 %
BLD GP AB SCN CELLS X3 SERPL QL: NORMAL
DIFFERENTIAL METHOD: ABNORMAL
EOSINOPHIL # BLD AUTO: 0.1 K/UL
EOSINOPHIL NFR BLD: 2.5 %
ERYTHROCYTE [DISTWIDTH] IN BLOOD BY AUTOMATED COUNT: 22.7 %
HCT VFR BLD AUTO: 24.9 %
HGB BLD-MCNC: 8.6 G/DL
HYPOCHROMIA BLD QL SMEAR: ABNORMAL
LYMPHOCYTES # BLD AUTO: 1.3 K/UL
LYMPHOCYTES NFR BLD: 55.6 %
MCH RBC QN AUTO: 34.1 PG
MCHC RBC AUTO-ENTMCNC: 34.5 %
MCV RBC AUTO: 99 FL
MONOCYTES # BLD AUTO: 0.2 K/UL
MONOCYTES NFR BLD: 6.7 %
NEUTROPHILS # BLD AUTO: 0.8 K/UL
NEUTROPHILS NFR BLD: 35.2 %
OVALOCYTES BLD QL SMEAR: ABNORMAL
PLATELET # BLD AUTO: 33 K/UL
PMV BLD AUTO: 10.6 FL
POIKILOCYTOSIS BLD QL SMEAR: SLIGHT
POLYCHROMASIA BLD QL SMEAR: ABNORMAL
RBC # BLD AUTO: 2.52 M/UL
SPHEROCYTES BLD QL SMEAR: ABNORMAL
WBC # BLD AUTO: 2.39 K/UL

## 2017-06-30 PROCEDURE — 36415 COLL VENOUS BLD VENIPUNCTURE: CPT

## 2017-06-30 PROCEDURE — 86900 BLOOD TYPING SEROLOGIC ABO: CPT

## 2017-06-30 PROCEDURE — 85025 COMPLETE CBC W/AUTO DIFF WBC: CPT

## 2017-06-30 PROCEDURE — 86850 RBC ANTIBODY SCREEN: CPT

## 2017-07-03 ENCOUNTER — TELEPHONE (OUTPATIENT)
Dept: RADIOLOGY | Facility: HOSPITAL | Age: 67
End: 2017-07-03

## 2017-07-05 ENCOUNTER — HOSPITAL ENCOUNTER (OUTPATIENT)
Dept: RADIOLOGY | Facility: HOSPITAL | Age: 67
Discharge: HOME | End: 2017-07-05
Attending: INTERNAL MEDICINE
Payer: MEDICARE

## 2017-07-05 DIAGNOSIS — D64.9 ANEMIA, UNSPECIFIED TYPE: ICD-10-CM

## 2017-07-05 PROCEDURE — 74177 CT ABD & PELVIS W/CONTRAST: CPT | Mod: 26,,, | Performed by: RADIOLOGY

## 2017-07-05 PROCEDURE — 25500020 PHARM REV CODE 255: Performed by: INTERNAL MEDICINE

## 2017-07-05 PROCEDURE — 74177 CT ABD & PELVIS W/CONTRAST: CPT | Mod: TC

## 2017-07-05 PROCEDURE — 71260 CT THORAX DX C+: CPT | Mod: 26,,, | Performed by: RADIOLOGY

## 2017-07-05 PROCEDURE — 71260 CT THORAX DX C+: CPT | Mod: TC

## 2017-07-05 RX ADMIN — IOHEXOL 100 ML: 350 INJECTION, SOLUTION INTRAVENOUS at 11:07

## 2017-07-05 RX ADMIN — IOHEXOL 15 ML: 350 INJECTION, SOLUTION INTRAVENOUS at 09:07

## 2017-07-05 RX ADMIN — IOHEXOL 15 ML: 350 INJECTION, SOLUTION INTRAVENOUS at 10:07

## 2017-07-07 ENCOUNTER — LAB VISIT (OUTPATIENT)
Dept: LAB | Facility: HOSPITAL | Age: 67
End: 2017-07-07
Attending: INTERNAL MEDICINE
Payer: MEDICARE

## 2017-07-07 DIAGNOSIS — D61.818 PANCYTOPENIA: ICD-10-CM

## 2017-07-07 LAB
ABO + RH BLD: NORMAL
ANISOCYTOSIS BLD QL SMEAR: SLIGHT
BASOPHILS # BLD AUTO: 0.01 K/UL
BASOPHILS NFR BLD: 0.3 %
BLD GP AB SCN CELLS X3 SERPL QL: NORMAL
DIFFERENTIAL METHOD: ABNORMAL
EOSINOPHIL # BLD AUTO: 0.1 K/UL
EOSINOPHIL NFR BLD: 2.3 %
ERYTHROCYTE [DISTWIDTH] IN BLOOD BY AUTOMATED COUNT: 25.4 %
HCT VFR BLD AUTO: 24.3 %
HGB BLD-MCNC: 8.1 G/DL
LYMPHOCYTES # BLD AUTO: 1.9 K/UL
LYMPHOCYTES NFR BLD: 52.4 %
MCH RBC QN AUTO: 34 PG
MCHC RBC AUTO-ENTMCNC: 33.3 %
MCV RBC AUTO: 102 FL
MONOCYTES # BLD AUTO: 0.3 K/UL
MONOCYTES NFR BLD: 8.7 %
NEUTROPHILS # BLD AUTO: 1.3 K/UL
NEUTROPHILS NFR BLD: 36.3 %
PLATELET # BLD AUTO: 43 K/UL
PLATELET BLD QL SMEAR: ABNORMAL
PMV BLD AUTO: 9.8 FL
POLYCHROMASIA BLD QL SMEAR: ABNORMAL
RBC # BLD AUTO: 2.38 M/UL
WBC # BLD AUTO: 3.55 K/UL

## 2017-07-07 PROCEDURE — 86900 BLOOD TYPING SEROLOGIC ABO: CPT

## 2017-07-07 PROCEDURE — 36415 COLL VENOUS BLD VENIPUNCTURE: CPT

## 2017-07-07 PROCEDURE — 86850 RBC ANTIBODY SCREEN: CPT

## 2017-07-07 PROCEDURE — 85025 COMPLETE CBC W/AUTO DIFF WBC: CPT

## 2017-07-14 DIAGNOSIS — D46.9 MDS (MYELODYSPLASTIC SYNDROME): Primary | ICD-10-CM

## 2017-07-21 ENCOUNTER — OFFICE VISIT (OUTPATIENT)
Dept: HEMATOLOGY/ONCOLOGY | Facility: CLINIC | Age: 67
End: 2017-07-21
Payer: MEDICARE

## 2017-07-21 VITALS
DIASTOLIC BLOOD PRESSURE: 71 MMHG | WEIGHT: 192.25 LBS | TEMPERATURE: 98 F | HEART RATE: 84 BPM | SYSTOLIC BLOOD PRESSURE: 166 MMHG | BODY MASS INDEX: 31.99 KG/M2

## 2017-07-21 DIAGNOSIS — D61.818 PANCYTOPENIA: ICD-10-CM

## 2017-07-21 PROCEDURE — 99999 PR PBB SHADOW E&M-EST. PATIENT-LVL III: CPT | Mod: PBBFAC,,, | Performed by: INTERNAL MEDICINE

## 2017-07-21 PROCEDURE — 99215 OFFICE O/P EST HI 40 MIN: CPT | Mod: S$GLB,,, | Performed by: INTERNAL MEDICINE

## 2017-07-21 PROCEDURE — 1159F MED LIST DOCD IN RCRD: CPT | Mod: S$GLB,,, | Performed by: INTERNAL MEDICINE

## 2017-07-21 PROCEDURE — 1126F AMNT PAIN NOTED NONE PRSNT: CPT | Mod: S$GLB,,, | Performed by: INTERNAL MEDICINE

## 2017-07-24 NOTE — PROGRESS NOTES
Subjective:       Patient ID: Susan Puenet is a 66 y.o. female.    Chief Complaint: Anemia    Patient presents today for follow up of her MDS 5 q del syndrome. She had resumed relvimid 10 mg daily after concurrent desensitization with prednisone. Unfortunately developed progressive pancytopenia. Due to this, she had a repeat BMBX  performed 6/8/17 and showed a hypercellular marrow (60%) continued atypia in the granulocytes and megakaryocytes were noted.  Additionally, there is erythroid atypia present. No increase in blasts. Cytogenetics are normal and MDS FISH is negative, failing to show 5 q minus. Anemia work up revealed bienvenido negative hemolysis. Revlimid has been stopped and she completed her steroid taper.  CBC is now stable and she has been transfusion independent for 1 month. She feels well enough to start a water aerobics program.       History   Ms. Puente is a 66 year old female with hx of DM2, peripheral vascular disease, tobacco use, CAD, hyperlipidemia, hypertension who was hospitalized 11/10/16 for anemia. hgb 5.3  MCH 43.4 with normal WBC and platelets. Patient had normal iron stores. She was transfused 3 units of PRBCs and discharged home with hgb 8.7 on 11/11/16. She was referred for further evalutation of her anemia. On 12/16/16 patient had a normal SPEP and immunofixation. Slightly elevated kappa light chains with normal ration. Elevated vitamin b12, normal folate, JAYDEN was positive with a low titer and negative profile. Patient had a bone marrow biopsy 1/5/16 which showed the core biopsy is normocellular for age (40%); however, megakaryocytes are increased and show  frequent small, hypolobated forms. Additionally, a subset of the neutrophils are hypogranular. Blasts are not increased by either morphology (1.2%) or in the corresponding flow cytometric analysis. Fish detects a 5q deletion in 54.5% of nuclei. Cytogenetics reported 20 metaphases, 2 metaphases were normal and 18  metaphases had a 5q deletion. No additional cytogenetic abnormalities were detected. Findings consistent with 5q deletion syndrome.     Patient had a delay in obtaining revlimid 10 mg daily but did start taking the medication 2/8/17. On 2/9/17 patient developed a diffuse maculopapular rash throughout scalp arms, legs and torso. She states she had no stridor or wheezing. She discontinued medication 2/15/17. Went to allergist who provided references on desensitization so that patient could resume Revlimid. Unfortunately developed pancytopenia and Revlimid stopped 6/16/17.       Anemia   Symptoms include pallor. There has been no abdominal pain, bruising/bleeding easily or light-headedness.     Review of Systems   Constitutional: Positive for fatigue. Negative for diaphoresis.   HENT: Negative for mouth sores and nosebleeds.    Eyes: Negative for photophobia and visual disturbance.   Respiratory: Negative for cough and shortness of breath.    Cardiovascular: Negative for chest pain and leg swelling.   Gastrointestinal: Negative for abdominal distention and abdominal pain.   Genitourinary: Negative for dysuria and hematuria.   Musculoskeletal: Negative for gait problem and joint swelling.   Skin: Positive for pallor. Negative for color change.   Neurological: Negative for light-headedness and numbness.   Hematological: Negative for adenopathy. Does not bruise/bleed easily.   Psychiatric/Behavioral: Negative for agitation and behavioral problems.       Objective:      Physical Exam   Constitutional: She is oriented to person, place, and time. She appears well-developed and well-nourished.   HENT:   Mouth/Throat: Oropharynx is clear and moist.   Eyes: Pupils are equal, round, and reactive to light.   pale   Cardiovascular: Normal rate and regular rhythm.    Pulmonary/Chest: Effort normal and breath sounds normal.   Abdominal: Soft. Bowel sounds are normal.   Lymphadenopathy:     She has no cervical adenopathy.    Neurological: She is alert and oriented to person, place, and time.   Skin: Skin is warm and dry.   Psychiatric: She has a normal mood and affect. Her behavior is normal.       Assessment:       1. Pancytopenia        Plan:   Patient has MDS first diagnosed as 5 q minus syndrome that responded to Revlimid.  Patient developed severe allergy to Revlimid. She was desentiized to the medication but then developed drug induced hemolysis to Revlimid.  CBC is stable off all therapy.  Repeat BM biopsy had normal cytogenetics.

## 2017-07-28 ENCOUNTER — TELEPHONE (OUTPATIENT)
Dept: HEMATOLOGY/ONCOLOGY | Facility: CLINIC | Age: 67
End: 2017-07-28

## 2017-07-28 ENCOUNTER — LAB VISIT (OUTPATIENT)
Dept: LAB | Facility: HOSPITAL | Age: 67
End: 2017-07-28
Attending: INTERNAL MEDICINE
Payer: MEDICARE

## 2017-07-28 DIAGNOSIS — D61.818 PANCYTOPENIA: ICD-10-CM

## 2017-07-28 LAB
ABO + RH BLD: NORMAL
BASOPHILS # BLD AUTO: 0.02 K/UL
BASOPHILS NFR BLD: 0.4 %
BLD GP AB SCN CELLS X3 SERPL QL: NORMAL
DIFFERENTIAL METHOD: ABNORMAL
EOSINOPHIL # BLD AUTO: 0.2 K/UL
EOSINOPHIL NFR BLD: 4.3 %
ERYTHROCYTE [DISTWIDTH] IN BLOOD BY AUTOMATED COUNT: ABNORMAL %
HCT VFR BLD AUTO: 29 %
HGB BLD-MCNC: 9.6 G/DL
LYMPHOCYTES # BLD AUTO: 1.9 K/UL
LYMPHOCYTES NFR BLD: 36.1 %
MCH RBC QN AUTO: 36.8 PG
MCHC RBC AUTO-ENTMCNC: 33.1 G/DL
MCV RBC AUTO: 111 FL
MONOCYTES # BLD AUTO: 0.3 K/UL
MONOCYTES NFR BLD: 6.1 %
NEUTROPHILS # BLD AUTO: 2.7 K/UL
NEUTROPHILS NFR BLD: 52.7 %
PLATELET # BLD AUTO: 87 K/UL
PMV BLD AUTO: 10.3 FL
RBC # BLD AUTO: 2.61 M/UL
WBC # BLD AUTO: 5.12 K/UL

## 2017-07-28 PROCEDURE — 86850 RBC ANTIBODY SCREEN: CPT

## 2017-07-28 PROCEDURE — 86900 BLOOD TYPING SEROLOGIC ABO: CPT

## 2017-07-28 PROCEDURE — 85025 COMPLETE CBC W/AUTO DIFF WBC: CPT

## 2017-07-28 PROCEDURE — 36415 COLL VENOUS BLD VENIPUNCTURE: CPT

## 2017-07-28 NOTE — TELEPHONE ENCOUNTER
----- Message from Mikayla Gary sent at 7/28/2017  4:23 PM CDT -----  Contact: PT  Requesting lab results, [phone]

## 2017-07-28 NOTE — TELEPHONE ENCOUNTER
----- Message from Mikayla Gary sent at 7/28/2017  4:23 PM CDT -----  Contact: PT  Requesting lab results, [phone]

## 2017-08-04 ENCOUNTER — TELEPHONE (OUTPATIENT)
Dept: HEMATOLOGY/ONCOLOGY | Facility: CLINIC | Age: 67
End: 2017-08-04

## 2017-08-04 ENCOUNTER — LAB VISIT (OUTPATIENT)
Dept: LAB | Facility: HOSPITAL | Age: 67
End: 2017-08-04
Payer: MEDICARE

## 2017-08-04 DIAGNOSIS — D61.818 PANCYTOPENIA: ICD-10-CM

## 2017-08-04 LAB
ABO + RH BLD: NORMAL
BASOPHILS # BLD AUTO: 0.01 K/UL
BASOPHILS NFR BLD: 0.2 %
BLD GP AB SCN CELLS X3 SERPL QL: NORMAL
DIFFERENTIAL METHOD: ABNORMAL
EOSINOPHIL # BLD AUTO: 0.2 K/UL
EOSINOPHIL NFR BLD: 5 %
ERYTHROCYTE [DISTWIDTH] IN BLOOD BY AUTOMATED COUNT: ABNORMAL %
HCT VFR BLD AUTO: 29.2 %
HGB BLD-MCNC: 9.8 G/DL
LYMPHOCYTES # BLD AUTO: 1.6 K/UL
LYMPHOCYTES NFR BLD: 36.9 %
MCH RBC QN AUTO: 37.8 PG
MCHC RBC AUTO-ENTMCNC: 33.6 G/DL
MCV RBC AUTO: 113 FL
MONOCYTES # BLD AUTO: 0.3 K/UL
MONOCYTES NFR BLD: 6.3 %
NEUTROPHILS # BLD AUTO: 2.3 K/UL
NEUTROPHILS NFR BLD: 51.4 %
PLATELET # BLD AUTO: 91 K/UL
PMV BLD AUTO: 10.7 FL
RBC # BLD AUTO: 2.59 M/UL
WBC # BLD AUTO: 4.44 K/UL

## 2017-08-04 PROCEDURE — 85025 COMPLETE CBC W/AUTO DIFF WBC: CPT

## 2017-08-04 PROCEDURE — 36415 COLL VENOUS BLD VENIPUNCTURE: CPT

## 2017-08-04 PROCEDURE — 86900 BLOOD TYPING SEROLOGIC ABO: CPT

## 2017-08-04 PROCEDURE — 86850 RBC ANTIBODY SCREEN: CPT

## 2017-08-04 NOTE — TELEPHONE ENCOUNTER
----- Message from Kamila Betancur sent at 8/4/2017  1:46 PM CDT -----  Contact: pt   Pt is calling for blood work results.    Pt can be reached at [phone].

## 2017-08-08 ENCOUNTER — TELEPHONE (OUTPATIENT)
Dept: HEMATOLOGY/ONCOLOGY | Facility: CLINIC | Age: 67
End: 2017-08-08

## 2017-08-08 NOTE — TELEPHONE ENCOUNTER
Returned call to Isamar/[facility]. Informed her that per Dr Valdes's last note, Revlimid has been stopped.

## 2017-08-08 NOTE — TELEPHONE ENCOUNTER
----- Message from Elsy Eason sent at 8/8/2017 10:01 AM CDT -----  Contact: Isamar with [facility]  Isamar needs to speak the nurse to find if the patient is going to continue with the Revlimid.  Isamar's # is [phone] option 1 then option 5.

## 2017-08-11 ENCOUNTER — TELEPHONE (OUTPATIENT)
Dept: HEMATOLOGY/ONCOLOGY | Facility: CLINIC | Age: 67
End: 2017-08-11

## 2017-08-11 NOTE — TELEPHONE ENCOUNTER
----- Message from Frantz Cormier sent at 8/11/2017  3:44 PM CDT -----  Contact: PT   PT will like to know the results from blood work today     Pt contact: [phone]    Spoke with pt on 08/11/17 @ 417pm results given to pt, and also explained that results were released to [facility], pt verbalized undeerstanding.  Nancy

## 2017-08-18 ENCOUNTER — OFFICE VISIT (OUTPATIENT)
Dept: HEMATOLOGY/ONCOLOGY | Facility: CLINIC | Age: 67
End: 2017-08-18
Payer: MEDICARE

## 2017-08-18 VITALS
DIASTOLIC BLOOD PRESSURE: 78 MMHG | BODY MASS INDEX: 24.91 KG/M2 | HEIGHT: 65 IN | HEART RATE: 76 BPM | WEIGHT: 149.5 LBS | SYSTOLIC BLOOD PRESSURE: 144 MMHG | RESPIRATION RATE: 16 BRPM | OXYGEN SATURATION: 94 %

## 2017-08-18 DIAGNOSIS — D46.9 MYELODYSPLASTIC SYNDROME: Primary | ICD-10-CM

## 2017-08-18 PROCEDURE — 3077F SYST BP >= 140 MM HG: CPT | Mod: S$GLB,,, | Performed by: INTERNAL MEDICINE

## 2017-08-18 PROCEDURE — 99215 OFFICE O/P EST HI 40 MIN: CPT | Mod: S$GLB,,, | Performed by: INTERNAL MEDICINE

## 2017-08-18 PROCEDURE — 99999 PR PBB SHADOW E&M-EST. PATIENT-LVL III: CPT | Mod: PBBFAC,,, | Performed by: INTERNAL MEDICINE

## 2017-08-18 PROCEDURE — 3008F BODY MASS INDEX DOCD: CPT | Mod: S$GLB,,, | Performed by: INTERNAL MEDICINE

## 2017-08-18 PROCEDURE — 1159F MED LIST DOCD IN RCRD: CPT | Mod: S$GLB,,, | Performed by: INTERNAL MEDICINE

## 2017-08-18 PROCEDURE — 1126F AMNT PAIN NOTED NONE PRSNT: CPT | Mod: S$GLB,,, | Performed by: INTERNAL MEDICINE

## 2017-08-18 PROCEDURE — 3078F DIAST BP <80 MM HG: CPT | Mod: S$GLB,,, | Performed by: INTERNAL MEDICINE

## 2017-08-18 NOTE — PROGRESS NOTES
Subjective:       Patient ID: Susan Puente is a 67 y.o. female.    Chief Complaint: Pancytopenia; Anemia, unspecified type; and Results    Patient presents today for follow up of her MDS 5 q del syndrome. She had resumed relvimid 10 mg daily after concurrent desensitization with prednisone. Unfortunately developed progressive pancytopenia. Due to this, she had a repeat BMBX  performed 6/8/17 and showed a hypercellular marrow (60%) continued atypia in the granulocytes and megakaryocytes were noted.  Additionally, there is erythroid atypia present. No increase in blasts. Cytogenetics are normal and MDS FISH is negative, failing to show 5 q minus. NGS should no significant molecular mutations.  Anemia work up revealed bienvenido negative hemolysis. Revlimid has been stopped since June 2017 and she completed her steroid taper.  CBC is now stable and she has been transfusion independent for >1 month. She feels well and has started a water aerobics program.        History   Ms. Puente is a 66 year old female with hx of DM2, peripheral vascular disease, tobacco use, CAD, hyperlipidemia, hypertension who was hospitalized 11/10/16 for anemia. hgb 5.3  MCH 43.4 with normal WBC and platelets. Patient had normal iron stores. She was transfused 3 units of PRBCs and discharged home with hgb 8.7 on 11/11/16. She was referred for further evalutation of her anemia. On 12/16/16 patient had a normal SPEP and immunofixation. Slightly elevated kappa light chains with normal ration. Elevated vitamin b12, normal folate, JAYDEN was positive with a low titer and negative profile. Patient had a bone marrow biopsy 1/5/16 which showed the core biopsy is normocellular for age (40%); however, megakaryocytes are increased and show  frequent small, hypolobated forms. Additionally, a subset of the neutrophils are hypogranular. Blasts are not increased by either morphology (1.2%) or in the corresponding flow cytometric analysis. Fish  detects a 5q deletion in 54.5% of nuclei. Cytogenetics reported 20 metaphases, 2 metaphases were normal and 18 metaphases had a 5q deletion. No additional cytogenetic abnormalities were detected. Findings consistent with 5q deletion syndrome.     Patient had a delay in obtaining revlimid 10 mg daily but did start taking the medication 2/8/17. On 2/9/17 patient developed a diffuse maculopapular rash throughout scalp arms, legs and torso. She states she had no stridor or wheezing. She discontinued medication 2/15/17. Went to allergist who provided references on desensitization so that patient could resume Revlimid. Unfortunately developed pancytopenia and Revlimid stopped 6/16/17.       Review of Systems   Constitutional: Negative for fatigue and fever.   HENT: Negative for congestion and mouth sores.    Eyes: Negative for photophobia and visual disturbance.   Respiratory: Negative for cough and shortness of breath.    Cardiovascular: Negative for chest pain and leg swelling.   Gastrointestinal: Negative for abdominal distention and abdominal pain.   Endocrine: Negative for polyphagia and polyuria.   Genitourinary: Negative for dysuria and hematuria.   Musculoskeletal: Negative for gait problem and joint swelling.   Skin: Negative for color change and pallor.   Neurological: Negative for dizziness and numbness.   Hematological: Negative for adenopathy. Does not bruise/bleed easily.   Psychiatric/Behavioral: Negative for agitation and behavioral problems.       Objective:      Physical Exam   Constitutional: She is oriented to person, place, and time. She appears well-developed and well-nourished.   HENT:   Mouth/Throat: Oropharynx is clear and moist. No oropharyngeal exudate.   Eyes: Conjunctivae are normal. Pupils are equal, round, and reactive to light.   Cardiovascular: Normal rate and regular rhythm.    Pulmonary/Chest: Effort normal and breath sounds normal.   Abdominal: Soft. Bowel sounds are normal.    Lymphadenopathy:     She has no cervical adenopathy.   Neurological: She is alert and oriented to person, place, and time.   Skin: Skin is warm and dry.   Psychiatric: She has a normal mood and affect. Her behavior is normal.       Assessment:       1. Myelodysplastic syndrome        Plan:   MDS. Initially with 5 q minus syndrome and treated with Revlimid. Developed allergy and was then desensitized. Soon after developed pancytopenia and Revlimid held since June 2017. On recent BMBX,6/8/17, has normal cytogenetics. As CBC has been stable, will repeat counts in 4 weeks. No therapy or intervention for her MDS indicated at this time.  Patient seen with Dr. Lucio Contreras MD   Pager [phone]

## 2017-09-15 ENCOUNTER — OFFICE VISIT (OUTPATIENT)
Dept: HEMATOLOGY/ONCOLOGY | Facility: CLINIC | Age: 67
End: 2017-09-15
Payer: MEDICARE

## 2017-09-15 ENCOUNTER — LAB VISIT (OUTPATIENT)
Dept: LAB | Facility: HOSPITAL | Age: 67
End: 2017-09-15
Attending: INTERNAL MEDICINE
Payer: MEDICARE

## 2017-09-15 VITALS
OXYGEN SATURATION: 96 % | BODY MASS INDEX: 31.45 KG/M2 | DIASTOLIC BLOOD PRESSURE: 70 MMHG | SYSTOLIC BLOOD PRESSURE: 162 MMHG | WEIGHT: 189 LBS | HEART RATE: 80 BPM | TEMPERATURE: 98 F

## 2017-09-15 DIAGNOSIS — D46.9 MYELODYSPLASTIC SYNDROME: ICD-10-CM

## 2017-09-15 DIAGNOSIS — D46.C MDS (MYELODYSPLASTIC SYNDROME) WITH 5Q DELETION: Primary | ICD-10-CM

## 2017-09-15 LAB
BASOPHILS # BLD AUTO: 0 K/UL
BASOPHILS NFR BLD: 0 %
DIFFERENTIAL METHOD: ABNORMAL
EOSINOPHIL # BLD AUTO: 0.2 K/UL
EOSINOPHIL NFR BLD: 5.1 %
ERYTHROCYTE [DISTWIDTH] IN BLOOD BY AUTOMATED COUNT: 15.5 %
HCT VFR BLD AUTO: 35 %
HGB BLD-MCNC: 12 G/DL
LYMPHOCYTES # BLD AUTO: 2 K/UL
LYMPHOCYTES NFR BLD: 41.4 %
MCH RBC QN AUTO: 37.9 PG
MCHC RBC AUTO-ENTMCNC: 34.3 G/DL
MCV RBC AUTO: 110 FL
MONOCYTES # BLD AUTO: 0.3 K/UL
MONOCYTES NFR BLD: 5.9 %
NEUTROPHILS # BLD AUTO: 2.2 K/UL
NEUTROPHILS NFR BLD: 47.6 %
PLATELET # BLD AUTO: 95 K/UL
PMV BLD AUTO: 10.5 FL
RBC # BLD AUTO: 3.17 M/UL
WBC # BLD AUTO: 4.73 K/UL

## 2017-09-15 PROCEDURE — 1157F ADVNC CARE PLAN IN RCRD: CPT | Mod: S$GLB,,, | Performed by: INTERNAL MEDICINE

## 2017-09-15 PROCEDURE — 3008F BODY MASS INDEX DOCD: CPT | Mod: S$GLB,,, | Performed by: INTERNAL MEDICINE

## 2017-09-15 PROCEDURE — 1126F AMNT PAIN NOTED NONE PRSNT: CPT | Mod: S$GLB,,, | Performed by: INTERNAL MEDICINE

## 2017-09-15 PROCEDURE — 3078F DIAST BP <80 MM HG: CPT | Mod: S$GLB,,, | Performed by: INTERNAL MEDICINE

## 2017-09-15 PROCEDURE — 36415 COLL VENOUS BLD VENIPUNCTURE: CPT

## 2017-09-15 PROCEDURE — 1159F MED LIST DOCD IN RCRD: CPT | Mod: S$GLB,,, | Performed by: INTERNAL MEDICINE

## 2017-09-15 PROCEDURE — 99999 PR PBB SHADOW E&M-EST. PATIENT-LVL III: CPT | Mod: PBBFAC,,, | Performed by: INTERNAL MEDICINE

## 2017-09-15 PROCEDURE — 99215 OFFICE O/P EST HI 40 MIN: CPT | Mod: S$GLB,,, | Performed by: INTERNAL MEDICINE

## 2017-09-15 PROCEDURE — 3077F SYST BP >= 140 MM HG: CPT | Mod: S$GLB,,, | Performed by: INTERNAL MEDICINE

## 2017-09-18 NOTE — PROGRESS NOTES
Subjective:       Patient ID: Susan Puente is a 67 y.o. female.    Chief Complaint: No chief complaint on file.    Patient presents today for follow up of her MDS 5 q del syndrome. She had resumed relvimid 10 mg daily after concurrent desensitization with prednisone. Unfortunately developed progressive pancytopenia. Due to this, she had a repeat BMBX  performed 6/8/17 and showed a hypercellular marrow (60%) continued atypia in the granulocytes and megakaryocytes were noted.  Additionally, there is erythroid atypia present. No increase in blasts. Cytogenetics are normal and MDS FISH is negative, failing to show 5 q minus. NGS should no significant molecular mutations.  Anemia work up revealed bienvenido negative hemolysis. Revlimid has been stopped since June 2017 and she completed her steroid taper.  CBC is now normal and she has been transfusion independent for >2 months. She feels well and has started a water aerobics program. She is now considering finding a new job. She also just returned from vacation visiting family.     History   Ms. Puente is a 66 year old female with hx of DM2, peripheral vascular disease, tobacco use, CAD, hyperlipidemia, hypertension who was hospitalized 11/10/16 for anemia. hgb 5.3  MCH 43.4 with normal WBC and platelets. Patient had normal iron stores. She was transfused 3 units of PRBCs and discharged home with hgb 8.7 on 11/11/16. She was referred for further evalutation of her anemia. On 12/16/16 patient had a normal SPEP and immunofixation. Slightly elevated kappa light chains with normal ration. Elevated vitamin b12, normal folate, JAYDEN was positive with a low titer and negative profile. Patient had a bone marrow biopsy 1/5/16 which showed the core biopsy is normocellular for age (40%); however, megakaryocytes are increased and show  frequent small, hypolobated forms. Additionally, a subset of the neutrophils are hypogranular. Blasts are not increased by either  morphology (1.2%) or in the corresponding flow cytometric analysis. Fish detects a 5q deletion in 54.5% of nuclei. Cytogenetics reported 20 metaphases, 2 metaphases were normal and 18 metaphases had a 5q deletion. No additional cytogenetic abnormalities were detected. Findings consistent with 5q deletion syndrome.     Patient had a delay in obtaining revlimid 10 mg daily but did start taking the medication 2/8/17. On 2/9/17 patient developed a diffuse maculopapular rash throughout scalp arms, legs and torso. She states she had no stridor or wheezing. She discontinued medication 2/15/17. Went to allergist who provided references on desensitization so that patient could resume Revlimid. Unfortunately developed pancytopenia and Revlimid stopped 6/16/17.       Review of Systems   Constitutional: Negative for fatigue and fever.   HENT: Negative for congestion and mouth sores.    Eyes: Negative for photophobia and visual disturbance.   Respiratory: Negative for cough and shortness of breath.    Cardiovascular: Negative for chest pain and leg swelling.   Gastrointestinal: Negative for abdominal distention and abdominal pain.   Endocrine: Negative for polyphagia and polyuria.   Genitourinary: Negative for dysuria and hematuria.   Musculoskeletal: Negative for gait problem and joint swelling.   Skin: Negative for color change and pallor.   Neurological: Negative for dizziness and numbness.   Hematological: Negative for adenopathy. Does not bruise/bleed easily.   Psychiatric/Behavioral: Negative for agitation and behavioral problems.       Objective:      Physical Exam   Constitutional: She is oriented to person, place, and time. She appears well-developed and well-nourished.   HENT:   Mouth/Throat: Oropharynx is clear and moist. No oropharyngeal exudate.   Eyes: Conjunctivae are normal. Pupils are equal, round, and reactive to light.   Cardiovascular: Normal rate and regular rhythm.    Pulmonary/Chest: Effort normal and  breath sounds normal.   Abdominal: Soft. Bowel sounds are normal.   Lymphadenopathy:     She has no cervical adenopathy.   Neurological: She is alert and oriented to person, place, and time.   Skin: Skin is warm and dry.   Psychiatric: She has a normal mood and affect. Her behavior is normal.       Assessment:       1. MDS (myelodysplastic syndrome) with 5q deletion        Plan:   MDS. Initially with 5 q minus syndrome and treated with Revlimid. Developed allergy and was then desensitized. Soon after developed pancytopenia and Revlimid held since June 2017. On recent BMBX,6/8/17, has normal cytogenetics. As CBC has been stable, will repeat counts in 8 weeks. No therapy or intervention for her MDS indicated at this time.    Return visit with Clyde Monge and myself in [facility].

## 2017-09-22 LAB
ANNOTATION COMMENT IMP: NORMAL
DX: NORMAL
NGS CLINCIAL TRIALS: NORMAL
NGS INTERPRETATION: NORMAL
NGS ONCOHEME PANEL GENE LIST: NORMAL
NGS PATHOGENIC MUTATIONS DETECTED: NORMAL
NGS REVIEWED BY:: NORMAL
NGS VARIANTS OF UNKNOWN SIGNIFICANCE: NORMAL
REF LAB TEST METHOD: NORMAL
SPECIMEN SOURCE: NORMAL
TEST PERFORMANCE INFO SPEC: NORMAL

## 2017-10-23 DIAGNOSIS — E11.9 TYPE 2 DIABETES MELLITUS WITHOUT COMPLICATION: ICD-10-CM

## 2017-11-07 ENCOUNTER — LAB VISIT (OUTPATIENT)
Dept: LAB | Facility: HOSPITAL | Age: 67
End: 2017-11-07
Attending: INTERNAL MEDICINE
Payer: MEDICARE

## 2017-11-07 ENCOUNTER — OFFICE VISIT (OUTPATIENT)
Dept: HEMATOLOGY/ONCOLOGY | Facility: CLINIC | Age: 67
End: 2017-11-07
Payer: MEDICARE

## 2017-11-07 VITALS
BODY MASS INDEX: 33.46 KG/M2 | DIASTOLIC BLOOD PRESSURE: 79 MMHG | WEIGHT: 200.81 LBS | HEART RATE: 94 BPM | RESPIRATION RATE: 16 BRPM | SYSTOLIC BLOOD PRESSURE: 166 MMHG | OXYGEN SATURATION: 95 % | TEMPERATURE: 98 F | HEIGHT: 65 IN

## 2017-11-07 DIAGNOSIS — D46.C MDS (MYELODYSPLASTIC SYNDROME) WITH 5Q DELETION: Primary | ICD-10-CM

## 2017-11-07 DIAGNOSIS — D46.9 MYELODYSPLASTIC SYNDROME: ICD-10-CM

## 2017-11-07 LAB
BASOPHILS # BLD AUTO: 0.02 K/UL
BASOPHILS NFR BLD: 0.4 %
DIFFERENTIAL METHOD: ABNORMAL
EOSINOPHIL # BLD AUTO: 0.2 K/UL
EOSINOPHIL NFR BLD: 4 %
ERYTHROCYTE [DISTWIDTH] IN BLOOD BY AUTOMATED COUNT: 13.5 %
HCT VFR BLD AUTO: 35.9 %
HGB BLD-MCNC: 11.9 G/DL
IMM GRANULOCYTES # BLD AUTO: 0.01 K/UL
IMM GRANULOCYTES NFR BLD AUTO: 0.2 %
LYMPHOCYTES # BLD AUTO: 1.7 K/UL
LYMPHOCYTES NFR BLD: 38.2 %
MCH RBC QN AUTO: 37.1 PG
MCHC RBC AUTO-ENTMCNC: 33.1 G/DL
MCV RBC AUTO: 112 FL
MONOCYTES # BLD AUTO: 0.3 K/UL
MONOCYTES NFR BLD: 7.2 %
NEUTROPHILS # BLD AUTO: 2.2 K/UL
NEUTROPHILS NFR BLD: 50 %
NRBC BLD-RTO: 0 /100 WBC
PLATELET # BLD AUTO: 98 K/UL
PMV BLD AUTO: 10.4 FL
RBC # BLD AUTO: 3.21 M/UL
WBC # BLD AUTO: 4.45 K/UL

## 2017-11-07 PROCEDURE — 3077F SYST BP >= 140 MM HG: CPT | Mod: CPTII,GC,S$GLB, | Performed by: INTERNAL MEDICINE

## 2017-11-07 PROCEDURE — 99213 OFFICE O/P EST LOW 20 MIN: CPT | Mod: GC,S$GLB,, | Performed by: INTERNAL MEDICINE

## 2017-11-07 PROCEDURE — 3078F DIAST BP <80 MM HG: CPT | Mod: CPTII,GC,S$GLB, | Performed by: INTERNAL MEDICINE

## 2017-11-07 PROCEDURE — 36415 COLL VENOUS BLD VENIPUNCTURE: CPT

## 2017-11-07 PROCEDURE — 85025 COMPLETE CBC W/AUTO DIFF WBC: CPT

## 2017-11-07 PROCEDURE — 99999 PR PBB SHADOW E&M-EST. PATIENT-LVL IV: CPT | Mod: PBBFAC,GC,,

## 2017-11-07 NOTE — PROGRESS NOTES
"Subjective:       Patient ID: Susan Puente is a 67 y.o. female.    Chief Complaint: No chief complaint on file.    Patient presents today for follow up of her history of MDS 5 q del syndrome.  Revlimid has been stopped since June 2017 after she developed pancytopenia while on Revlimid.  CBC is now normal and she has been transfusion independent for >4months. She feels well although stressed with pressure to look for a job. Has been eating more "junk food" and thus has gained weight. Determined to exercise more often and participate in social events.      History   Ms. Puente is a 66 year old female with hx of DM2, peripheral vascular disease, tobacco use, CAD, hyperlipidemia, hypertension who was hospitalized 11/10/16 for anemia. hgb 5.3  MCH 43.4 with normal WBC and platelets. Patient had normal iron stores. She was transfused 3 units of PRBCs and discharged home with hgb 8.7 on 11/11/16. She was referred for further evalutation of her anemia. On 12/16/16 patient had a normal SPEP and immunofixation. Slightly elevated kappa light chains with normal ration. Elevated vitamin b12, normal folate, JAYDEN was positive with a low titer and negative profile. Patient had a bone marrow biopsy 1/5/16 which showed the core biopsy is normocellular for age (40%); however, megakaryocytes are increased and show  frequent small, hypolobated forms. Additionally, a subset of the neutrophils are hypogranular. Blasts are not increased by either morphology (1.2%) or in the corresponding flow cytometric analysis. Fish detects a 5q deletion in 54.5% of nuclei. Cytogenetics reported 20 metaphases, 2 metaphases were normal and 18 metaphases had a 5q deletion. No additional cytogenetic abnormalities were detected. Findings consistent with 5q deletion syndrome.     Patient had a delay in obtaining revlimid 10 mg daily but did start taking the medication 2/8/17. On 2/9/17 patient developed a diffuse maculopapular rash throughout " scalp arms, legs and torso. She states she had no stridor or wheezing. She discontinued medication 2/15/17. Went to allergist who provided references on desensitization so that patient could resume Revlimid. Unfortunately developed pancytopenia and Revlimid stopped 6/16/17.   She had a repeat BMBX  performed 6/8/17 and showed a hypercellular marrow (60%) continued atypia in the granulocytes and megakaryocytes were noted.  Additionally, there is erythroid atypia present. No increase in blasts. Cytogenetics are normal and MDS FISH is negative, failing to show 5 q minus. NGS should no significant molecular mutations.  Anemia work up revealed bienvenido negative hemolysis.      Review of Systems   Constitutional: Negative for fever and unexpected weight change.   HENT: Negative for congestion and trouble swallowing.    Eyes: Negative for photophobia and visual disturbance.   Respiratory: Negative for cough and shortness of breath.    Cardiovascular: Negative for chest pain and leg swelling.   Gastrointestinal: Negative for abdominal distention and abdominal pain.   Genitourinary: Negative for dysuria and hematuria.   Musculoskeletal: Negative for joint swelling and myalgias.   Skin: Negative for color change and pallor.   Neurological: Negative for light-headedness and headaches.   Hematological: Negative for adenopathy. Does not bruise/bleed easily.   Psychiatric/Behavioral: Negative for agitation.       Objective:      Physical Exam   Constitutional: She is oriented to person, place, and time. She appears well-developed and well-nourished.   HENT:   Mouth/Throat: Oropharynx is clear and moist. No oropharyngeal exudate.   Eyes: Conjunctivae are normal. Pupils are equal, round, and reactive to light.   Cardiovascular: Normal rate and regular rhythm.    Pulmonary/Chest: Effort normal and breath sounds normal.   Abdominal: Soft. Bowel sounds are normal.   Lymphadenopathy:     She has no cervical adenopathy.   Neurological:  She is alert and oriented to person, place, and time.   Skin: Skin is warm and dry.   Psychiatric: She has a normal mood and affect. Her behavior is normal.       Assessment:       No diagnosis found.    Plan:   MDS. Initially with 5 q minus syndrome and treated with Revlimid. Developed allergy and was then desensitized. Soon after developed pancytopenia and Revlimid held since June 2017. On recent BMBX,6/8/17, has normal cytogenetics. As CBC has been stable, will repeat counts in 12 weeks. No therapy or intervention for her MDS indicated at this time.    Patient seen with Dr. Lucio Contreras MD   Pager [phone]

## 2017-11-10 DIAGNOSIS — E11.9 TYPE 2 DIABETES MELLITUS WITHOUT COMPLICATION: ICD-10-CM

## 2017-11-17 DIAGNOSIS — I25.10 CORONARY ARTERY DISEASE, ANGINA PRESENCE UNSPECIFIED, UNSPECIFIED VESSEL OR LESION TYPE, UNSPECIFIED WHETHER NATIVE OR TRANSPLANTED HEART: ICD-10-CM

## 2017-11-17 RX ORDER — LISINOPRIL AND HYDROCHLOROTHIAZIDE 12.5; 2 MG/1; MG/1
2 TABLET ORAL DAILY
Qty: 180 TABLET | Refills: 3 | Status: SHIPPED | OUTPATIENT
Start: 2017-11-17 | End: 2019-03-07 | Stop reason: SDUPTHER

## 2017-11-17 NOTE — TELEPHONE ENCOUNTER
"----- Message from Tabitha WALLY Pavon sent at 11/17/2017 10:44 AM CST -----  Contact: self/[phone]      RX request - refill or new RX.  Is this a refill or new RX:  Refill  RX name and strength: metformin (GLUCOPHAGE) 500 MG tablet  Directions: Take 0.5 tablets (250 mg total) by mouth 2 (two) times daily with meals. - Oral    RX request - refill or new RX.  Is this a refill or new RX: refill   RX name and strength: lisinopril-hydrochlorothiazide (PRINZIDE,ZESTORETIC) 20-12.5 mg per tablet  Directions: Take 2 tablets by mouth once daily. - Oral  Is this a 30 day or 90 day RX:    Pharmacy name and phone # (DON'T enter "on file" or "in chart"): [facility] - [facility] [address] [phone] (Phone)  [phone] (Fax)  Comments:  Please call and advise.     Thank you!!!              "

## 2017-12-27 ENCOUNTER — OFFICE VISIT (OUTPATIENT)
Dept: INTERNAL MEDICINE | Facility: CLINIC | Age: 67
End: 2017-12-27
Payer: MEDICARE

## 2017-12-27 VITALS
HEART RATE: 85 BPM | BODY MASS INDEX: 33.11 KG/M2 | WEIGHT: 206 LBS | SYSTOLIC BLOOD PRESSURE: 110 MMHG | HEIGHT: 66 IN | DIASTOLIC BLOOD PRESSURE: 76 MMHG

## 2017-12-27 DIAGNOSIS — Z23 NEED FOR STREPTOCOCCUS PNEUMONIAE VACCINATION: ICD-10-CM

## 2017-12-27 DIAGNOSIS — M89.9 DISORDER OF BONE: ICD-10-CM

## 2017-12-27 DIAGNOSIS — I10 ESSENTIAL HYPERTENSION: Primary | ICD-10-CM

## 2017-12-27 DIAGNOSIS — E11.9 CONTROLLED TYPE 2 DIABETES MELLITUS WITHOUT COMPLICATION, WITHOUT LONG-TERM CURRENT USE OF INSULIN: ICD-10-CM

## 2017-12-27 DIAGNOSIS — D46.C MDS (MYELODYSPLASTIC SYNDROME) WITH 5Q DELETION: ICD-10-CM

## 2017-12-27 PROCEDURE — 99999 PR PBB SHADOW E&M-EST. PATIENT-LVL III: CPT | Mod: PBBFAC,,, | Performed by: INTERNAL MEDICINE

## 2017-12-27 PROCEDURE — 99214 OFFICE O/P EST MOD 30 MIN: CPT | Mod: S$GLB,,, | Performed by: INTERNAL MEDICINE

## 2017-12-27 PROCEDURE — G0009 ADMIN PNEUMOCOCCAL VACCINE: HCPCS | Mod: S$GLB,,, | Performed by: INTERNAL MEDICINE

## 2017-12-27 PROCEDURE — 90732 PPSV23 VACC 2 YRS+ SUBQ/IM: CPT | Mod: S$GLB,,, | Performed by: INTERNAL MEDICINE

## 2017-12-27 RX ORDER — METFORMIN HYDROCHLORIDE 500 MG/1
500 TABLET, EXTENDED RELEASE ORAL
Qty: 90 TABLET | Refills: 3 | Status: SHIPPED | OUTPATIENT
Start: 2017-12-27 | End: 2018-09-24 | Stop reason: SDUPTHER

## 2017-12-27 RX ORDER — METFORMIN HYDROCHLORIDE 500 MG/1
250 TABLET ORAL 2 TIMES DAILY WITH MEALS
Qty: 30 TABLET | Refills: 11 | Status: CANCELLED | OUTPATIENT
Start: 2017-12-27

## 2017-12-27 NOTE — PROGRESS NOTES
"Subjective:       Patient ID: Susan Puente is a 67 y.o. female.    Chief Complaint: Annual Exam (yearly check up.)    HPI   68 yo F here for follow upof HTN, DM2, MDS.   She has had ongoing issues with anemia 2/2 MDS, is in remission now with last hgb 11.9.   Not currently working. Living on SS and money from leukemia program.   Last blood transfusion in July.     Has completed cessation program and doing well off cigarettes. Does use the nicotine inhaler intermittently. 1 cartridge daily. Cessation program is not covering the nicotrol inhalers any more.     Takes calcium daily OTC.   Previously on fosamax but over 20 years ago.   Right hip is intermittently bothering her.   Tylenol arthritis.     Review of Systems   Constitutional: Negative for fever.   HENT: Negative.    Eyes: Negative.    Respiratory: Negative for shortness of breath.    Cardiovascular: Negative for chest pain and leg swelling.   Gastrointestinal: Negative for abdominal pain, diarrhea, nausea and vomiting.   Genitourinary: Negative.    Musculoskeletal: Negative for arthralgias.   Skin: Negative for rash.   Psychiatric/Behavioral: Negative.        Objective:   /76 (BP Location: Right arm, Patient Position: Sitting, BP Method: Medium (Manual))   Pulse 85   Ht 5' 6" (1.676 m)   Wt 93.4 kg (206 lb)   SpO2 (!) 92%   BMI 33.25 kg/m²      Physical Exam   Constitutional: She is oriented to person, place, and time. She appears well-developed and well-nourished.   HENT:   Head: Normocephalic and atraumatic.   Eyes: Conjunctivae and EOM are normal. Pupils are equal, round, and reactive to light.   Neck: Neck supple. No thyromegaly present.   Cardiovascular: Normal rate, regular rhythm and normal heart sounds.    No murmur heard.  Pulmonary/Chest: Effort normal and breath sounds normal. No respiratory distress. She has no wheezes.   Abdominal: Soft. Bowel sounds are normal. She exhibits no distension. There is no tenderness. "   Musculoskeletal: Normal range of motion.   Normal ROM bilateral hips with normal external and internal rotation, 5/5 lower ext strength, normal straight leg raise bilaterally   Neurological: She is alert and oriented to person, place, and time.   Skin: Skin is warm and dry. No rash noted.   Psychiatric: She has a normal mood and affect. Judgment and thought content normal.   Vitals reviewed.      Protective Sensation (w/ 10 gram monofilament):  Right: Decreased  Left: Decreased    Visual Inspection:  Normal -  Bilateral    Pedal Pulses:   Right: Present  Left: Present    Posterior tibialis:   Right:Present  Left: Present      Assessment:       1. Essential hypertension    2. Controlled type 2 diabetes mellitus without complication, without long-term current use of insulin    3. MDS (myelodysplastic syndrome) with 5q deletion    4. Disorder of bone    5. Need for Streptococcus pneumoniae vaccination        Plan:       Susan was seen today for annual exam.    Diagnoses and all orders for this visit:    Essential hypertension  At goal, continue current meds    Controlled type 2 diabetes mellitus without complication, without long-term current use of insulin  -     Comprehensive metabolic panel; Future  -     Hemoglobin A1c; Future  -     Lipid panel; Future  -     metFORMIN (GLUCOPHAGE-XR) 500 MG 24 hr tablet; Take 1 tablet (500 mg total) by mouth daily with breakfast.   Change to xr due to stomach upset wit IR    Hx of MDS (myelodysplastic syndrome) with 5q deletion in remission  -     CBC auto differential; Future    Disorder of bone  -     DXA Bone Density Spine And Hip; Future    Need for Streptococcus pneumoniae vaccination  -     Pneumococcal Polysaccharide Vaccine (23 Valent) (SQ/IM)

## 2017-12-28 ENCOUNTER — HOSPITAL ENCOUNTER (OUTPATIENT)
Dept: RADIOLOGY | Facility: CLINIC | Age: 67
Discharge: HOME | End: 2017-12-28
Attending: INTERNAL MEDICINE
Payer: MEDICARE

## 2017-12-28 ENCOUNTER — HOSPITAL ENCOUNTER (OUTPATIENT)
Dept: RADIOLOGY | Facility: HOSPITAL | Age: 67
Discharge: HOME | End: 2017-12-28
Attending: INTERNAL MEDICINE
Payer: MEDICARE

## 2017-12-28 DIAGNOSIS — Z12.31 ENCOUNTER FOR SCREENING MAMMOGRAM FOR BREAST CANCER: ICD-10-CM

## 2017-12-28 DIAGNOSIS — Z00.00 HEALTHCARE MAINTENANCE: ICD-10-CM

## 2017-12-28 DIAGNOSIS — M89.9 DISORDER OF BONE: ICD-10-CM

## 2017-12-28 PROCEDURE — 77080 DXA BONE DENSITY AXIAL: CPT | Mod: 26,,, | Performed by: INTERNAL MEDICINE

## 2017-12-28 PROCEDURE — 77067 SCR MAMMO BI INCL CAD: CPT | Mod: 26,,, | Performed by: RADIOLOGY

## 2017-12-28 PROCEDURE — 77080 DXA BONE DENSITY AXIAL: CPT | Mod: TC

## 2017-12-28 PROCEDURE — 77067 SCR MAMMO BI INCL CAD: CPT | Mod: TC

## 2018-01-05 ENCOUNTER — PES CALL (OUTPATIENT)
Dept: ADMINISTRATIVE | Facility: CLINIC | Age: 68
End: 2018-01-05

## 2018-01-26 ENCOUNTER — CLINICAL SUPPORT (OUTPATIENT)
Dept: SMOKING CESSATION | Facility: CLINIC | Age: 68
End: 2018-01-26
Payer: COMMERCIAL

## 2018-01-26 ENCOUNTER — TELEPHONE (OUTPATIENT)
Dept: SMOKING CESSATION | Facility: CLINIC | Age: 68
End: 2018-01-26

## 2018-01-26 DIAGNOSIS — F17.200 NICOTINE DEPENDENCE: Primary | ICD-10-CM

## 2018-01-26 PROCEDURE — 99407 BEHAV CHNG SMOKING > 10 MIN: CPT | Mod: S$GLB,,, | Performed by: INTERNAL MEDICINE

## 2018-01-29 ENCOUNTER — PATIENT MESSAGE (OUTPATIENT)
Dept: HEMATOLOGY/ONCOLOGY | Facility: CLINIC | Age: 68
End: 2018-01-29

## 2018-01-30 DIAGNOSIS — D46.9 MDS (MYELODYSPLASTIC SYNDROME): Primary | ICD-10-CM

## 2018-02-26 ENCOUNTER — OFFICE VISIT (OUTPATIENT)
Dept: HEMATOLOGY/ONCOLOGY | Facility: CLINIC | Age: 68
End: 2018-02-26
Payer: MEDICARE

## 2018-02-26 ENCOUNTER — TELEPHONE (OUTPATIENT)
Dept: INFUSION THERAPY | Facility: HOSPITAL | Age: 68
End: 2018-02-26

## 2018-02-26 ENCOUNTER — LAB VISIT (OUTPATIENT)
Dept: LAB | Facility: HOSPITAL | Age: 68
End: 2018-02-26
Attending: INTERNAL MEDICINE
Payer: MEDICARE

## 2018-02-26 VITALS
TEMPERATURE: 98 F | WEIGHT: 210.75 LBS | SYSTOLIC BLOOD PRESSURE: 144 MMHG | HEART RATE: 97 BPM | BODY MASS INDEX: 33.87 KG/M2 | RESPIRATION RATE: 16 BRPM | HEIGHT: 66 IN | DIASTOLIC BLOOD PRESSURE: 96 MMHG | OXYGEN SATURATION: 93 %

## 2018-02-26 DIAGNOSIS — F32.A DEPRESSION, UNSPECIFIED DEPRESSION TYPE: ICD-10-CM

## 2018-02-26 DIAGNOSIS — D46.C MDS (MYELODYSPLASTIC SYNDROME) WITH 5Q DELETION: ICD-10-CM

## 2018-02-26 DIAGNOSIS — D46.9 MDS (MYELODYSPLASTIC SYNDROME): Primary | ICD-10-CM

## 2018-02-26 DIAGNOSIS — D46.9 MDS (MYELODYSPLASTIC SYNDROME): ICD-10-CM

## 2018-02-26 LAB
ALBUMIN SERPL BCP-MCNC: 4 G/DL
ALP SERPL-CCNC: 81 U/L
ALT SERPL W/O P-5'-P-CCNC: 36 U/L
ANION GAP SERPL CALC-SCNC: 12 MMOL/L
AST SERPL-CCNC: 21 U/L
BASOPHILS # BLD AUTO: 0.03 K/UL
BASOPHILS NFR BLD: 0.7 %
BILIRUB SERPL-MCNC: 0.5 MG/DL
BUN SERPL-MCNC: 27 MG/DL
CALCIUM SERPL-MCNC: 12 MG/DL
CHLORIDE SERPL-SCNC: 102 MMOL/L
CO2 SERPL-SCNC: 25 MMOL/L
CREAT SERPL-MCNC: 1 MG/DL
DIFFERENTIAL METHOD: ABNORMAL
EOSINOPHIL # BLD AUTO: 0.2 K/UL
EOSINOPHIL NFR BLD: 4.5 %
ERYTHROCYTE [DISTWIDTH] IN BLOOD BY AUTOMATED COUNT: 15.8 %
EST. GFR  (AFRICAN AMERICAN): >60 ML/MIN/1.73 M^2
EST. GFR  (NON AFRICAN AMERICAN): 58.4 ML/MIN/1.73 M^2
GLUCOSE SERPL-MCNC: 125 MG/DL
HCT VFR BLD AUTO: 33.2 %
HGB BLD-MCNC: 11.3 G/DL
IMM GRANULOCYTES # BLD AUTO: 0.06 K/UL
IMM GRANULOCYTES NFR BLD AUTO: 1.4 %
LYMPHOCYTES # BLD AUTO: 1.7 K/UL
LYMPHOCYTES NFR BLD: 37.2 %
MCH RBC QN AUTO: 38.4 PG
MCHC RBC AUTO-ENTMCNC: 34 G/DL
MCV RBC AUTO: 113 FL
MONOCYTES # BLD AUTO: 0.3 K/UL
MONOCYTES NFR BLD: 6.5 %
NEUTROPHILS # BLD AUTO: 2.2 K/UL
NEUTROPHILS NFR BLD: 49.7 %
NRBC BLD-RTO: 0 /100 WBC
PLATELET # BLD AUTO: 167 K/UL
PMV BLD AUTO: 11.8 FL
POTASSIUM SERPL-SCNC: 4.4 MMOL/L
PROT SERPL-MCNC: 7.7 G/DL
RBC # BLD AUTO: 2.94 M/UL
SODIUM SERPL-SCNC: 139 MMOL/L
WBC # BLD AUTO: 4.43 K/UL

## 2018-02-26 PROCEDURE — 99215 OFFICE O/P EST HI 40 MIN: CPT | Mod: GC,S$GLB,, | Performed by: INTERNAL MEDICINE

## 2018-02-26 PROCEDURE — 85025 COMPLETE CBC W/AUTO DIFF WBC: CPT

## 2018-02-26 PROCEDURE — 36415 COLL VENOUS BLD VENIPUNCTURE: CPT

## 2018-02-26 PROCEDURE — 80053 COMPREHEN METABOLIC PANEL: CPT

## 2018-02-26 PROCEDURE — 99999 PR PBB SHADOW E&M-EST. PATIENT-LVL III: CPT | Mod: PBBFAC,,, | Performed by: INTERNAL MEDICINE

## 2018-02-26 PROCEDURE — 3080F DIAST BP >= 90 MM HG: CPT | Mod: S$GLB,,, | Performed by: INTERNAL MEDICINE

## 2018-02-26 PROCEDURE — 3077F SYST BP >= 140 MM HG: CPT | Mod: S$GLB,,, | Performed by: INTERNAL MEDICINE

## 2018-02-26 NOTE — PROGRESS NOTES
"Subjective:       Patient ID: Susan Puente is a 67 y.o. female.    Chief Complaint: No chief complaint on file.    Patient presents today for follow up of her history of MDS 5 q del syndrome.  Revlimid has been stopped since June 2017 after she developed pancytopenia while on Revlimid.  CBC is now normal and she has been transfusion independent for >7months. She feels well although stressed with pressure to look for a job. Her search has been unsuccessful.  Has been eating more "junk food" and thus has gained weight.      History   Ms. Puente is a 66 year old female with hx of DM2, peripheral vascular disease, tobacco use, CAD, hyperlipidemia, hypertension who was hospitalized 11/10/16 for anemia. hgb 5.3  MCH 43.4 with normal WBC and platelets. Patient had normal iron stores. She was transfused 3 units of PRBCs and discharged home with hgb 8.7 on 11/11/16. She was referred for further evalutation of her anemia. On 12/16/16 patient had a normal SPEP and immunofixation. Slightly elevated kappa light chains with normal ration. Elevated vitamin b12, normal folate, JAYDEN was positive with a low titer and negative profile. Patient had a bone marrow biopsy 1/5/16 which showed the core biopsy is normocellular for age (40%); however, megakaryocytes are increased and show  frequent small, hypolobated forms. Additionally, a subset of the neutrophils are hypogranular. Blasts are not increased by either morphology (1.2%) or in the corresponding flow cytometric analysis. Fish detects a 5q deletion in 54.5% of nuclei. Cytogenetics reported 20 metaphases, 2 metaphases were normal and 18 metaphases had a 5q deletion. No additional cytogenetic abnormalities were detected. Findings consistent with 5q deletion syndrome.     Patient had a delay in obtaining revlimid 10 mg daily but did start taking the medication 2/8/17. On 2/9/17 patient developed a diffuse maculopapular rash throughout scalp arms, legs and torso. She " states she had no stridor or wheezing. She discontinued medication 2/15/17. Went to allergist who provided references on desensitization so that patient could resume Revlimid. Unfortunately developed pancytopenia and Revlimid stopped 6/16/17.   She had a repeat BMBX  performed 6/8/17 and showed a hypercellular marrow (60%) continued atypia in the granulocytes and megakaryocytes were noted.  Additionally, there is erythroid atypia present. No increase in blasts. Cytogenetics are normal and MDS FISH is negative, failing to show 5 q minus. NGS should no significant molecular mutations.  Anemia work up revealed bienvenido negative hemolysis.      Review of Systems   Constitutional: Positive for fatigue. Negative for fever.   HENT: Negative for congestion and trouble swallowing.    Respiratory: Positive for shortness of breath. Negative for cough.    Cardiovascular: Negative for chest pain and leg swelling.   Gastrointestinal: Negative for abdominal distention and abdominal pain.   Endocrine: Negative for polyphagia and polyuria.   Genitourinary: Negative for dysuria and urgency.   Musculoskeletal: Positive for arthralgias. Negative for myalgias.   Skin: Negative for color change and pallor.   Neurological: Negative for light-headedness and headaches.   Hematological: Negative for adenopathy. Does not bruise/bleed easily.   Psychiatric/Behavioral: Negative for agitation and behavioral problems.       Objective:      Physical Exam   Constitutional: She is oriented to person, place, and time. She appears well-developed and well-nourished.   HENT:   Mouth/Throat: Oropharynx is clear and moist. No oropharyngeal exudate.   Eyes: Conjunctivae are normal. Pupils are equal, round, and reactive to light.   Cardiovascular: Normal rate and regular rhythm.    Pulmonary/Chest: Effort normal and breath sounds normal.   Abdominal: Soft. Bowel sounds are normal.   Musculoskeletal: Normal range of motion.   Lymphadenopathy:     She has no  cervical adenopathy.   Neurological: She is alert and oriented to person, place, and time.   Skin: Skin is warm and dry.   Psychiatric: She has a normal mood and affect. Her behavior is normal.       Assessment:       1. MDS (myelodysplastic syndrome)        Plan:   MDS. Initially with 5 q minus syndrome and treated with Revlimid. Developed allergy and was then desensitized. Soon after developed pancytopenia and Revlimid held since June 2017. On recent BMBX,6/8/17, has normal cytogenetics. As CBC has been stable, will repeat counts in 12 weeks. No therapy or intervention for her MDS indicated at this time.  She is quite distressed with her current state of physical function - eating too much and not exercising while gaining weight. Plus she has not been able to get a job. - Will refer to psych oncology       Patient seen with Dr. Lucio Contreras MD   Pager [phone]

## 2018-02-28 ENCOUNTER — TELEPHONE (OUTPATIENT)
Dept: INFUSION THERAPY | Facility: HOSPITAL | Age: 68
End: 2018-02-28

## 2018-03-05 ENCOUNTER — OFFICE VISIT (OUTPATIENT)
Dept: PSYCHIATRY | Facility: CLINIC | Age: 68
End: 2018-03-05
Payer: MEDICARE

## 2018-03-05 DIAGNOSIS — F43.23 ADJUSTMENT DISORDER WITH MIXED ANXIETY AND DEPRESSED MOOD: Primary | ICD-10-CM

## 2018-03-05 PROCEDURE — 99999 PR PBB SHADOW E&M-EST. PATIENT-LVL II: CPT | Mod: PBBFAC,,, | Performed by: PSYCHOLOGIST

## 2018-03-05 PROCEDURE — 90791 PSYCH DIAGNOSTIC EVALUATION: CPT | Mod: S$GLB,,, | Performed by: PSYCHOLOGIST
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
## 2018-03-05 NOTE — LETTER
March 5, 2018        Jacinda Ramires MD  [address] [facility] [address]             [facility]  [address] [facility] [address]  Phone: [phone]  Fax: [phone]   Patient: Susan Puente   MR Number: [id number]   YOB: 1950   Date of Visit: 3/5/2018       Dear Dr. Ramires:    Thank you for referring Susan Puente to me for evaluation. Below are the relevant portions of my assessment and plan of care.    Susan Puente is a 67 y.o. female referred by Hillary Valdes and Clyde Monge for psychological evaluation and treatment.  Patient describes symptoms of depression and worsening anxiety secondary to job loss related to illness. She is not currently interested in psychotropic medication options, although she was successfully treated with Celexa and Prozac in the past. She is interested in psychotherapy and will follow up with me for CBT     If you have questions, please do not hesitate to call me. I look forward to following Susan along with you.    Sincerely,      Nando Marie, PhD           CC  Gita Valdes MD

## 2018-03-13 ENCOUNTER — OFFICE VISIT (OUTPATIENT)
Dept: PSYCHIATRY | Facility: CLINIC | Age: 68
End: 2018-03-13
Payer: MEDICARE

## 2018-03-13 DIAGNOSIS — F43.23 ADJUSTMENT DISORDER WITH MIXED ANXIETY AND DEPRESSED MOOD: Primary | ICD-10-CM

## 2018-03-13 PROCEDURE — 99999 PR PBB SHADOW E&M-EST. PATIENT-LVL II: CPT | Mod: PBBFAC,,, | Performed by: PSYCHOLOGIST

## 2018-03-13 PROCEDURE — 90834 PSYTX W PT 45 MINUTES: CPT | Mod: S$GLB,,, | Performed by: PSYCHOLOGIST

## 2018-03-13 NOTE — PROGRESS NOTES
PSYCHO-ONCOLOGY NOTE/ Individual Psychotherapy     Date: 3/13/2018   Site:  [facility]         Therapeutic Intervention: Met with patient.  Outpatient - Behavior modifying psychotherapy 45 min - CPT code 38757    The patient's last visit with me was on 3/5/2018.    Chief complaint/reason for encounter: depression and anxiety   Met with patient to evaluate psychosocial adaptation to diagnosis of myelodysplastic syndrome  Current Medications  Current Outpatient Prescriptions   Medication    acetaminophen (TYLENOL ARTHRITIS PAIN) 650 MG TbSR    aspirin (ECOTRIN) 81 MG EC tablet    b complex vitamins capsule    CETIRIZINE HCL (ZYRTEC ORAL)    fish oil-omega-3 fatty acids 300-1,000 mg capsule    lisinopril-hydrochlorothiazide (PRINZIDE,ZESTORETIC) 20-12.5 mg per tablet    metFORMIN (GLUCOPHAGE-XR) 500 MG 24 hr tablet    NICOTROL 10 mg Crtg    walker Misc     Current Facility-Administered Medications   Medication Frequency    0.9%  NaCl infusion (for blood administration) Q24H PRN    acetaminophen tablet 650 mg 1 time in Clinic/HOD     Facility-Administered Medications Ordered in Other Visits   Medication Frequency    0.9%  NaCl infusion (for blood administration) Once       Objective:  Susan Puente arrived 5 min late for the session.  Ms. Puente was independently ambulatory at the time of session. The patient was fully cooperative throughout the session.  Appearance: age appropriate,  casually dressed, adequately groomed  Behavior/Cooperation: friendly and cooperative  Speech: Not pressured, clearly audible, no slurring  Mood: dysphoric  Affect: mildly constricted  Thought Process: goal-directed, logical  Thought Content: normal,  No delusions or paranoia; did not appear to be responding to internal stimuli during the session  Orientation: grossly intact  Memory: Grossly intact  Attention Span/Concentration: Attends to session without distraction; reports no difficulty  Fund of Knowledge:  average  Estimate of Intelligence: average from verbal skills and history  Cognition: grossly intact  Insight: patient has awareness of illness; good insight into own behavior and behavior of others  Judgment: the patient's behavior is adequate to circumstances    Interval history and content of current session:  Discussed activities since the last visit. Discussed current adaptation to disease and treatment status. Reports to be coping with moderate difficulty. Evaluated cognitive response, paying particular attention to negative intrusive thoughts of a persistent and detrimental nature. Thoughts of this type are in evidence with moderate distress. Identified and evaluated psychosocial and environmental stressors secondary to diagnosis and treatment.  Focused on providing cognitive behavioral therapy to address negative cognitions. Examined proactive behaviors that may be implemented to minimize or ameliorate psychosocial stressors secondary to diagnosis and treatment.  Discussed efforts to increase activity level. Examined role of perceived peer group in influencing activities. Light therapy & exercise therapy for fatigue discussed.     Risk parameters:   Patient reports no suicidal ideation  Patient reports no homicidal ideation  Patient reports no self-injurious behavior  Patient reports no violent behavior   Safety needs:  None at this time      Verbal deficits: None     Patient's response to intervention:The patient's response to intervention is motivated.     Progress toward goals and other mental status changes:  The patient's progress toward goals is excellent.      Progress to date:Progress as Expected      Goals from last visit: Attempted, partially met      Patient reported outcomes:  Distress Thermometer:   Distress Score    Distress Score: 7        Practical Problems Physical Problems                                                   Family Problems                                         Emotional  Problems                                                         Spiritual/Religions Concerns               Other Problems             PHQ-9= 18 (22 at initial visit)   JASON-7= 17  (14 at initial visit)      Client Strengths: verbal, intelligent, successful, good social support, good insight, commitment to wellness, strong debbi, strong cultural traditions    Diagnosis:     ICD-10-CM ICD-9-CM   1. Adjustment disorder with mixed anxiety and depressed mood F43.23 309.28       Treatment Plan:individual psychotherapy  · Target symptoms: depression, anxiety , adjustment  · Why chosen therapy is appropriate versus another modality: relevant to diagnosis, evidence based practice  · Outcome monitoring methods: self-report, checklist/rating scale  · Therapeutic intervention type: behavior modifying psychotherapy  · Prognosis: Excellent      Behavioral goals:    Exercise:  Continue time at [place], possibly on days when already out of home   Stress management:   Social engagement:   Nutrition:   Smoking Cessation: continue as per [place] group   Therapy:  Excel book, Consistent wake time & early AM light, Journal, FG book    Return to clinic: 1 week     Length of Service (minutes direct face-to-face contact): 45    Nando Marie, PhD  [place] License #[id number]

## 2018-03-21 ENCOUNTER — OFFICE VISIT (OUTPATIENT)
Dept: PSYCHIATRY | Facility: CLINIC | Age: 68
End: 2018-03-21
Payer: MEDICARE

## 2018-03-21 DIAGNOSIS — F43.23 ADJUSTMENT DISORDER WITH MIXED ANXIETY AND DEPRESSED MOOD: Primary | ICD-10-CM

## 2018-03-21 PROCEDURE — 90834 PSYTX W PT 45 MINUTES: CPT | Mod: S$GLB,,, | Performed by: PSYCHOLOGIST

## 2018-03-21 PROCEDURE — 99999 PR PBB SHADOW E&M-EST. PATIENT-LVL II: CPT | Mod: PBBFAC,,, | Performed by: PSYCHOLOGIST

## 2018-03-21 NOTE — PROGRESS NOTES
PSYCHO-ONCOLOGY NOTE/ Individual Psychotherapy     Date: 3/21/2018   Site:  [facility]         Therapeutic Intervention: Met with patient.  Outpatient - Behavior modifying psychotherapy 45 min - CPT code 40970    The patient's last visit with me was on 3/13/2018.    Chief complaint/reason for encounter: depression and anxiety   Met with patient to evaluate psychosocial adaptation to diagnosis of myelodysplastic syndrome  Current Medications  Current Outpatient Prescriptions   Medication    acetaminophen (TYLENOL ARTHRITIS PAIN) 650 MG TbSR    aspirin (ECOTRIN) 81 MG EC tablet    b complex vitamins capsule    CETIRIZINE HCL (ZYRTEC ORAL)    fish oil-omega-3 fatty acids 300-1,000 mg capsule    lisinopril-hydrochlorothiazide (PRINZIDE,ZESTORETIC) 20-12.5 mg per tablet    metFORMIN (GLUCOPHAGE-XR) 500 MG 24 hr tablet    NICOTROL 10 mg Crtg    walker Misc     Current Facility-Administered Medications   Medication Frequency    0.9%  NaCl infusion (for blood administration) Q24H PRN    acetaminophen tablet 650 mg 1 time in Clinic/HOD     Facility-Administered Medications Ordered in Other Visits   Medication Frequency    0.9%  NaCl infusion (for blood administration) Once       Objective:  Susan Puente arrived on time for the session.  Ms. Puente was independently ambulatory at the time of session. The patient was fully cooperative throughout the session.  Appearance: age appropriate,  casually dressed, adequately groomed  Behavior/Cooperation: friendly and cooperative  Speech: Not pressured, clearly audible, no slurring  Mood: dysphoric  Affect: mildly constricted  Thought Process: goal-directed, logical  Thought Content: normal,  No delusions or paranoia; did not appear to be responding to internal stimuli during the session  Orientation: grossly intact  Memory: Grossly intact  Attention Span/Concentration: Attends to session without distraction; reports no difficulty  Fund of Knowledge:  average  Estimate of Intelligence: average from verbal skills and history  Cognition: grossly intact  Insight: patient has awareness of illness; good insight into own behavior and behavior of others  Judgment: the patient's behavior is adequate to circumstances    Interval history and content of current session:  Discussed activities since the last visit. Discussed current adaptation to disease and treatment status. Reports to be coping with moderate difficulty. Evaluated cognitive response, paying particular attention to negative intrusive thoughts of a persistent and detrimental nature. Thoughts of this type are in evidence with moderate distress (particularly anger at self over financial strain due to illness). Identified and evaluated psychosocial and environmental stressors secondary to diagnosis and treatment.  Focused on providing cognitive behavioral therapy to address negative cognitions. Examined proactive behaviors that may be implemented to minimize or ameliorate psychosocial stressors secondary to diagnosis and treatment.  Patient discussed strategies to improve job search process.  Discussed efforts to increase activity level. Examined role of sleep disturbance in perceived cognitive slowing.  Has decreased afternoon naps and kept consistent wake time.     Risk parameters:   Patient reports no suicidal ideation  Patient reports no homicidal ideation  Patient reports no self-injurious behavior  Patient reports no violent behavior      Safety needs:  None at this time      Verbal deficits: None     Patient's response to intervention:The patient's response to intervention is motivated.     Progress toward goals and other mental status changes:  The patient's progress toward goals is excellent.      Progress to date:Progress as Expected      Goals from last visit: Attempted, partially met      Patient reported outcomes:  Distress Thermometer:   Distress Score    Distress Score: 5        Practical Problems  Physical Problems                                                   Family Problems                                         Emotional Problems                                                         Spiritual/Religions Concerns               Other Problems             PHQ-9= 18 (22 at initial visit)   JASON-7= 12  (17 highest score)      Client Strengths: verbal, intelligent, successful, good social support, good insight, commitment to wellness, strong debbi, strong cultural traditions    Diagnosis:     ICD-10-CM ICD-9-CM   1. Adjustment disorder with mixed anxiety and depressed mood F43.23 309.28       Treatment Plan:individual psychotherapy  · Target symptoms: depression, anxiety , adjustment  · Why chosen therapy is appropriate versus another modality: relevant to diagnosis, evidence based practice  · Outcome monitoring methods: self-report, checklist/rating scale  · Therapeutic intervention type: behavior modifying psychotherapy  · Prognosis: Excellent      Behavioral goals:    Exercise:  Continue time at [place], possibly on days when already out of home   Stress management:   Social engagement:   Nutrition:   Smoking Cessation: continue as per [place] group   Therapy:  Continue consistent wake time & early AM light, Journal, FG book    [employer] job application/cover letter    Return to clinic: 1 week     Length of Service (minutes direct face-to-face contact): 45    Nando Marie, PhD  [place] License #[id number]

## 2018-03-28 ENCOUNTER — OFFICE VISIT (OUTPATIENT)
Dept: PSYCHIATRY | Facility: CLINIC | Age: 68
End: 2018-03-28
Payer: MEDICARE

## 2018-03-28 DIAGNOSIS — F43.23 ADJUSTMENT DISORDER WITH MIXED ANXIETY AND DEPRESSED MOOD: Primary | ICD-10-CM

## 2018-03-28 PROCEDURE — 90834 PSYTX W PT 45 MINUTES: CPT | Mod: S$GLB,,, | Performed by: PSYCHOLOGIST

## 2018-03-28 PROCEDURE — 99999 PR PBB SHADOW E&M-EST. PATIENT-LVL II: CPT | Mod: PBBFAC,,, | Performed by: PSYCHOLOGIST

## 2018-03-29 NOTE — PROGRESS NOTES
PSYCHO-ONCOLOGY NOTE/ Individual Psychotherapy     Date: 3/28/2018   Site:  [facility]         Therapeutic Intervention: Met with patient.  Outpatient - Behavior modifying psychotherapy 45 min - CPT code 13919    The patient's last visit with me was on 3/21/2018.    Chief complaint/reason for encounter: depression and anxiety   Met with patient to evaluate psychosocial adaptation to diagnosis of myelodysplastic syndrome  Current Medications  Current Outpatient Prescriptions   Medication    acetaminophen (TYLENOL ARTHRITIS PAIN) 650 MG TbSR    aspirin (ECOTRIN) 81 MG EC tablet    b complex vitamins capsule    CETIRIZINE HCL (ZYRTEC ORAL)    fish oil-omega-3 fatty acids 300-1,000 mg capsule    lisinopril-hydrochlorothiazide (PRINZIDE,ZESTORETIC) 20-12.5 mg per tablet    metFORMIN (GLUCOPHAGE-XR) 500 MG 24 hr tablet    NICOTROL 10 mg Crtg    walker Misc     Current Facility-Administered Medications   Medication Frequency    0.9%  NaCl infusion (for blood administration) Q24H PRN    acetaminophen tablet 650 mg 1 time in Clinic/HOD     Facility-Administered Medications Ordered in Other Visits   Medication Frequency    0.9%  NaCl infusion (for blood administration) Once       Objective:  Susan Puente arrived on time for the session.  Ms. Puente was independently ambulatory at the time of session. The patient was fully cooperative throughout the session.  Appearance: age appropriate,  casually dressed, adequately groomed  Behavior/Cooperation: friendly and cooperative  Speech: Not pressured, clearly audible, no slurring  Mood: dysphoric  Affect: mildly constricted  Thought Process: goal-directed, logical  Thought Content: normal,  No delusions or paranoia; did not appear to be responding to internal stimuli during the session  Orientation: grossly intact  Memory: Grossly intact  Attention Span/Concentration: Attends to session without distraction; reports no difficulty  Fund of Knowledge:  average  Estimate of Intelligence: average from verbal skills and history  Cognition: grossly intact  Insight: patient has awareness of illness; good insight into own behavior and behavior of others  Judgment: the patient's behavior is adequate to circumstances    Interval history and content of current session:  Discussed activities since the last visit. Discussed current adaptation to disease and treatment status. Reports to be coping with moderate difficulty. Evaluated cognitive response, paying particular attention to negative intrusive thoughts of a persistent and detrimental nature. Thoughts of this type are in evidence with moderate distress (particularly anger at self over financial strain due to illness). Identified and evaluated psychosocial and environmental stressors secondary to diagnosis and treatment.  Focused on providing cognitive behavioral therapy to address negative cognitions. Examined proactive behaviors that may be implemented to minimize or ameliorate psychosocial stressors secondary to diagnosis and treatment.  Discussed efforts to increase activity level. Examined role of sleep disturbance in perceived cognitive slowing.  Has decreased afternoon naps and kept consistent wake time.  Discussed self-talk in relationship to physical recovery.     Risk parameters:   Patient reports no suicidal ideation  Patient reports no homicidal ideation  Patient reports no self-injurious behavior  Patient reports no violent behavior      Safety needs:  None at this time      Verbal deficits: None     Patient's response to intervention:The patient's response to intervention is motivated.     Progress toward goals and other mental status changes:  The patient's progress toward goals is excellent.      Progress to date:Progress as Expected      Goals from last visit: Attempted, partially met      Patient reported outcomes:  Distress Thermometer:   Distress Score    Distress Score: 3        Practical Problems  Physical Problems                                                   Family Problems                                         Emotional Problems                                                         Spiritual/Religions Concerns               Other Problems             PHQ-9= 16 (22 at initial visit)   JASON-7= 9  (17 highest score)      Client Strengths: verbal, intelligent, successful, good social support, good insight, commitment to wellness, strong debbi, strong cultural traditions    Diagnosis:     ICD-10-CM ICD-9-CM   1. Adjustment disorder with mixed anxiety and depressed mood F43.23 309.28       Treatment Plan:individual psychotherapy  · Target symptoms: depression, anxiety , adjustment  · Why chosen therapy is appropriate versus another modality: relevant to diagnosis, evidence based practice  · Outcome monitoring methods: self-report, checklist/rating scale  · Therapeutic intervention type: behavior modifying psychotherapy  · Prognosis: Excellent      Behavioral goals:    Exercise:  Continue time at [place], possibly on days when already out of home   Stress management:   Social engagement:   Nutrition:   Smoking Cessation: continue as per [place] group   Therapy:  Continue consistent wake time & early AM light, Journal, FG book    Tweak job application/cover letter ([employer])    Finish taxes    Return to clinic: 1 week     Length of Service (minutes direct face-to-face contact): 45    Nando Marie, PhD  [place] License #[id number]

## 2018-04-06 ENCOUNTER — OFFICE VISIT (OUTPATIENT)
Dept: PSYCHIATRY | Facility: CLINIC | Age: 68
End: 2018-04-06
Payer: MEDICARE

## 2018-04-06 DIAGNOSIS — F43.23 ADJUSTMENT DISORDER WITH MIXED ANXIETY AND DEPRESSED MOOD: Primary | ICD-10-CM

## 2018-04-06 PROCEDURE — 90834 PSYTX W PT 45 MINUTES: CPT | Mod: S$GLB,,, | Performed by: PSYCHOLOGIST

## 2018-04-06 PROCEDURE — 99999 PR PBB SHADOW E&M-EST. PATIENT-LVL II: CPT | Mod: PBBFAC,,, | Performed by: PSYCHOLOGIST

## 2018-04-06 PROCEDURE — 99212 OFFICE O/P EST SF 10 MIN: CPT | Mod: PBBFAC | Performed by: PSYCHOLOGIST

## 2018-04-06 PROCEDURE — 90834 PSYTX W PT 45 MINUTES: CPT | Mod: PBBFAC | Performed by: PSYCHOLOGIST

## 2018-04-06 NOTE — PROGRESS NOTES
PSYCHO-ONCOLOGY NOTE/ Individual Psychotherapy     Date: 4/6/2018   Site:  [facility]         Therapeutic Intervention: Met with patient.  Outpatient - Behavior modifying psychotherapy 45 min - CPT code 87095    The patient's last visit with me was on 3/28/2018.    Chief complaint/reason for encounter: depression and anxiety   Met with patient to evaluate psychosocial adaptation to diagnosis of myelodysplastic syndrome  Current Medications  Current Outpatient Prescriptions   Medication    acetaminophen (TYLENOL ARTHRITIS PAIN) 650 MG TbSR    aspirin (ECOTRIN) 81 MG EC tablet    b complex vitamins capsule    CETIRIZINE HCL (ZYRTEC ORAL)    fish oil-omega-3 fatty acids 300-1,000 mg capsule    lisinopril-hydrochlorothiazide (PRINZIDE,ZESTORETIC) 20-12.5 mg per tablet    metFORMIN (GLUCOPHAGE-XR) 500 MG 24 hr tablet    NICOTROL 10 mg Crtg    walker Misc     Current Facility-Administered Medications   Medication Frequency    0.9%  NaCl infusion (for blood administration) Q24H PRN    acetaminophen tablet 650 mg 1 time in Clinic/HOD     Facility-Administered Medications Ordered in Other Visits   Medication Frequency    0.9%  NaCl infusion (for blood administration) Once       Objective:  Susan Puente arrived on time for the session.  Ms. Puente was independently ambulatory at the time of session. The patient was fully cooperative throughout the session.  Appearance: age appropriate,  casually dressed, adequately groomed  Behavior/Cooperation: friendly and cooperative  Speech: Not pressured, clearly audible, no slurring  Mood: euthymic  Affect: full range, appropriate to mood  Thought Process: goal-directed, logical  Thought Content: normal,  No delusions or paranoia; did not appear to be responding to internal stimuli during the session  Orientation: grossly intact  Memory: Grossly intact  Attention Span/Concentration: Attends to session without distraction; reports no difficulty  Fund of  Knowledge: average  Estimate of Intelligence: average from verbal skills and history  Cognition: grossly intact  Insight: patient has awareness of illness; good insight into own behavior and behavior of others  Judgment: the patient's behavior is adequate to circumstances    Interval history and content of current session:  Discussed activities since the last visit. Discussed current adaptation to disease and treatment status. Reports to be coping with moderate difficulty. Evaluated cognitive response, paying particular attention to negative intrusive thoughts of a persistent and detrimental nature. Thoughts of this type are in evidence with moderate distress (particularly anger at self over failure to progress faster). Identified and evaluated psychosocial and environmental stressors secondary to diagnosis and treatment.  Focused on providing cognitive behavioral therapy to address negative cognitions.  Concept of thought monitoring introduced.  Distortions sheet given/reviewed.     Examined proactive behaviors that may be implemented to minimize or ameliorate psychosocial stressors secondary to diagnosis and treatment.  Discussed efforts to increase activity level. Has decreased afternoon naps and kept consistent wake time.       Risk parameters:   Patient reports no suicidal ideation  Patient reports no homicidal ideation  Patient reports no self-injurious behavior  Patient reports no violent behavior      Safety needs:  None at this time      Verbal deficits: None     Patient's response to intervention:The patient's response to intervention is motivated.     Progress toward goals and other mental status changes:  The patient's progress toward goals is excellent.      Progress to date:Progress as Expected      Goals from last visit: Attempted, partially met      Patient reported outcomes:  Distress Thermometer:   Distress Score    Distress Score: 5        Practical Problems Physical Problems                                                    Family Problems                                         Emotional Problems                                                         Spiritual/Religions Concerns               Other Problems             PHQ-9= 18 (22 at initial visit)   JASON-7= 16 (17 highest score)      Client Strengths: verbal, intelligent, successful, good social support, good insight, commitment to wellness, strong debbi, strong cultural traditions    Diagnosis:     ICD-10-CM ICD-9-CM   1. Adjustment disorder with mixed anxiety and depressed mood F43.23 309.28       Treatment Plan:individual psychotherapy  · Target symptoms: depression, anxiety , adjustment  · Why chosen therapy is appropriate versus another modality: relevant to diagnosis, evidence based practice  · Outcome monitoring methods: self-report, checklist/rating scale  · Therapeutic intervention type: behavior modifying psychotherapy  · Prognosis: Excellent      Behavioral goals:    Exercise:  Re-institute time at [place]   Stress management:   Social engagement:  Volunteer work   Nutrition:   Smoking Cessation: continue as per [place] group   Therapy:  Continue consistent wake time & early AM light, limited naps    Journal, FG book or re-read distortions sheet         Return to clinic: 1 week     Length of Service (minutes direct face-to-face contact): 45    Nando Marie, PhD  [place] License #[id number]

## 2018-04-13 ENCOUNTER — OFFICE VISIT (OUTPATIENT)
Dept: PSYCHIATRY | Facility: CLINIC | Age: 68
End: 2018-04-13
Payer: MEDICARE

## 2018-04-13 DIAGNOSIS — F43.23 ADJUSTMENT DISORDER WITH MIXED ANXIETY AND DEPRESSED MOOD: Primary | ICD-10-CM

## 2018-04-13 PROCEDURE — 90834 PSYTX W PT 45 MINUTES: CPT | Mod: S$GLB,,, | Performed by: PSYCHOLOGIST

## 2018-04-13 PROCEDURE — 99999 PR PBB SHADOW E&M-EST. PATIENT-LVL II: CPT | Mod: PBBFAC,,, | Performed by: PSYCHOLOGIST

## 2018-04-15 NOTE — PROGRESS NOTES
PSYCHO-ONCOLOGY NOTE/ Individual Psychotherapy     Date: 4/13/2018   Site:  [facility]         Therapeutic Intervention: Met with patient.  Outpatient - Behavior modifying psychotherapy 45 min - CPT code 81902    The patient's last visit with me was on 4/6//2018.    Chief complaint/reason for encounter: depression and anxiety   Met with patient to evaluate psychosocial adaptation to diagnosis of myelodysplastic syndrome  Current Medications  Current Outpatient Prescriptions   Medication    acetaminophen (TYLENOL ARTHRITIS PAIN) 650 MG TbSR    aspirin (ECOTRIN) 81 MG EC tablet    b complex vitamins capsule    CETIRIZINE HCL (ZYRTEC ORAL)    fish oil-omega-3 fatty acids 300-1,000 mg capsule    lisinopril-hydrochlorothiazide (PRINZIDE,ZESTORETIC) 20-12.5 mg per tablet    metFORMIN (GLUCOPHAGE-XR) 500 MG 24 hr tablet    NICOTROL 10 mg Crtg    walker Misc     Current Facility-Administered Medications   Medication Frequency    0.9%  NaCl infusion (for blood administration) Q24H PRN    acetaminophen tablet 650 mg 1 time in Clinic/HOD     Facility-Administered Medications Ordered in Other Visits   Medication Frequency    0.9%  NaCl infusion (for blood administration) Once       Objective:  Susan Puente arrived on time for the session.  Ms. Puente was independently ambulatory at the time of session. The patient was fully cooperative throughout the session.  Appearance: age appropriate,  casually dressed, adequately groomed  Behavior/Cooperation: friendly and cooperative  Speech: Not pressured, clearly audible, no slurring  Mood: euthymic  Affect: full range, appropriate to mood  Thought Process: goal-directed, logical  Thought Content: normal,  No delusions or paranoia; did not appear to be responding to internal stimuli during the session  Orientation: grossly intact  Memory: Grossly intact  Attention Span/Concentration: Attends to session without distraction; reports no difficulty  Fund of  Knowledge: average  Estimate of Intelligence: average from verbal skills and history  Cognition: grossly intact  Insight: patient has awareness of illness; good insight into own behavior and behavior of others  Judgment: the patient's behavior is adequate to circumstances    Interval history and content of current session:  Discussed activities since the last visit. Discussed current adaptation to disease and treatment status. Reports to be coping with moderate difficulty. Evaluated cognitive response, paying particular attention to negative intrusive thoughts of a persistent and detrimental nature. Thoughts of this type are in evidence with moderate distress (particularly anger at self over perceived failures). Identified and evaluated psychosocial and environmental stressors secondary to diagnosis and treatment.  Focused on providing cognitive behavioral therapy to address negative cognitions.  Concept of activity/motivation/fatigue cycle discussed.       Examined proactive behaviors that may be implemented to minimize or ameliorate psychosocial stressors secondary to diagnosis and treatment.  Discussed efforts to increase activity level. Has decreased afternoon naps and kept consistent wake time.  Encouraged use of rollator to allow activities when fatigued.      Risk parameters:   Patient reports no suicidal ideation  Patient reports no homicidal ideation  Patient reports no self-injurious behavior  Patient reports no violent behavior      Safety needs:  None at this time      Verbal deficits: None     Patient's response to intervention:The patient's response to intervention is motivated.     Progress toward goals and other mental status changes:  The patient's progress toward goals is excellent.      Progress to date:Progress as Expected      Goals from last visit: Attempted, partially met      Patient reported outcomes:  Distress Thermometer:   Distress Score    Distress Score: 4        Practical Problems  Physical Problems                                                   Family Problems                                         Emotional Problems                                                         Spiritual/Religions Concerns               Other Problems             PHQ-9= 18 (22 at initial visit)   JASON-7= 12 (17 highest score)      Client Strengths: verbal, intelligent, successful, good social support, good insight, commitment to wellness, strong debbi, strong cultural traditions    Diagnosis:     ICD-10-CM ICD-9-CM   1. Adjustment disorder with mixed anxiety and depressed mood F43.23 309.28       Treatment Plan:individual psychotherapy  · Target symptoms: depression, anxiety , adjustment  · Why chosen therapy is appropriate versus another modality: relevant to diagnosis, evidence based practice  · Outcome monitoring methods: self-report, checklist/rating scale  · Therapeutic intervention type: behavior modifying psychotherapy  · Prognosis: Excellent      Behavioral goals:    Exercise:  Re-institute time at [place] (use rollator to get there)   Stress management:   Social engagement:  Volunteer work   Nutrition:   Smoking Cessation: continue as per [place] group   Therapy:  Continue consistent wake time & early AM light, limited naps    Journal, FG book or re-read distortions sheet    Complete taxes         Return to clinic: 1 week     Length of Service (minutes direct face-to-face contact): 45    Nanod Marie, PhD  [place] License #[id number]

## 2018-04-27 ENCOUNTER — OFFICE VISIT (OUTPATIENT)
Dept: PSYCHIATRY | Facility: CLINIC | Age: 68
End: 2018-04-27
Payer: MEDICARE

## 2018-04-27 DIAGNOSIS — F43.23 ADJUSTMENT DISORDER WITH MIXED ANXIETY AND DEPRESSED MOOD: Primary | ICD-10-CM

## 2018-04-27 PROCEDURE — 99999 PR PBB SHADOW E&M-EST. PATIENT-LVL II: CPT | Mod: PBBFAC,,, | Performed by: PSYCHOLOGIST

## 2018-04-27 PROCEDURE — 90834 PSYTX W PT 45 MINUTES: CPT | Mod: S$GLB,,, | Performed by: PSYCHOLOGIST

## 2018-04-27 NOTE — PROGRESS NOTES
PSYCHO-ONCOLOGY NOTE/ Individual Psychotherapy     Date: 4/27/2018   Site:  [facility] [address]         Therapeutic Intervention: Met with patient.  Outpatient - Behavior modifying psychotherapy 45 min - CPT code 71744    The patient's last visit with me was on 4/13//2018.    Chief complaint/reason for encounter: depression and anxiety   Met with patient to evaluate psychosocial adaptation to diagnosis of myelodysplastic syndrome  Current Medications  Current Outpatient Prescriptions   Medication    acetaminophen (TYLENOL ARTHRITIS PAIN) 650 MG TbSR    aspirin (ECOTRIN) 81 MG EC tablet    b complex vitamins capsule    CETIRIZINE HCL (ZYRTEC ORAL)    fish oil-omega-3 fatty acids 300-1,000 mg capsule    lisinopril-hydrochlorothiazide (PRINZIDE,ZESTORETIC) 20-12.5 mg per tablet    metFORMIN (GLUCOPHAGE-XR) 500 MG 24 hr tablet    NICOTROL 10 mg Crtg    walker Misc     Current Facility-Administered Medications   Medication Frequency    0.9%  NaCl infusion (for blood administration) Q24H PRN    acetaminophen tablet 650 mg 1 time in Clinic/HOD     Facility-Administered Medications Ordered in Other Visits   Medication Frequency    0.9%  NaCl infusion (for blood administration) Once       Objective:  Susan Puente arrived on time for the session.  Ms. Puente was independently ambulatory at the time of session. The patient was fully cooperative throughout the session.  Appearance: age appropriate,  casually dressed, adequately groomed  Behavior/Cooperation: friendly and cooperative  Speech: Not pressured, clearly audible, no slurring  Mood: euthymic  Affect: full range, appropriate to mood  Thought Process: goal-directed, logical  Thought Content: normal,  No delusions or paranoia; did not appear to be responding to internal stimuli during the session  Orientation: grossly intact  Memory: Grossly intact  Attention Span/Concentration: Attends to session without distraction; reports no difficulty  Fund of  Knowledge: average  Estimate of Intelligence: average from verbal skills and history  Cognition: grossly intact  Insight: patient has awareness of illness; good insight into own behavior and behavior of others  Judgment: the patient's behavior is adequate to circumstances    Interval history and content of current session:  Discussed activities since the last visit. Discussed current adaptation to disease and treatment status. Reports to be coping with moderate difficulty. Evaluated cognitive response, paying particular attention to negative intrusive thoughts of a persistent and detrimental nature. Thoughts of this type are in evidence with moderate distress (particularly anger at self over perceived failures). Identified and evaluated psychosocial and environmental stressors secondary to diagnosis and treatment.  Focused on providing cognitive behavioral therapy to address negative cognitions.  Concept maintaining assertive responses to others and minimizing health focus in conversations discussed..    Patient completed most of goals.  DIscussed relief over having finished taxes without a tax burden.  Futility of avoidance discussed.  Using valued activities as a guide for dividing time encouraged.      Examined proactive behaviors that may be implemented to minimize or ameliorate psychosocial stressors secondary to diagnosis and treatment.  Discussed efforts to increase activity level. Has decreased afternoon naps and kept consistent wake time.  Encouraged return to physical exercise.      Risk parameters:   Patient reports no suicidal ideation  Patient reports no homicidal ideation  Patient reports no self-injurious behavior  Patient reports no violent behavior      Safety needs:  None at this time      Verbal deficits: None     Patient's response to intervention:The patient's response to intervention is motivated.     Progress toward goals and other mental status changes:  The patient's progress toward goals is  excellent.      Progress to date:Progress as Expected      Goals from last visit: Attempted, partially met      Patient reported outcomes:  Distress Thermometer:   Distress Score             Practical Problems Physical Problems                                                   Family Problems                                         Emotional Problems                                                         Spiritual/Religions Concerns               Other Problems             PHQ-9= 11 (22 at initial visit)   JASON-7= 8 (17 highest score)      Client Strengths: verbal, intelligent, successful, good social support, good insight, commitment to wellness, strong debbi, strong cultural traditions    Diagnosis:     ICD-10-CM ICD-9-CM   1. Adjustment disorder with mixed anxiety and depressed mood F43.23 309.28       Treatment Plan:individual psychotherapy  · Target symptoms: depression, anxiety , adjustment  · Why chosen therapy is appropriate versus another modality: relevant to diagnosis, evidence based practice  · Outcome monitoring methods: self-report, checklist/rating scale  · Therapeutic intervention type: behavior modifying psychotherapy  · Prognosis: Excellent      Behavioral goals:    Exercise:  Re-institute time at [place] (use rollator to get there)   Stress management:   Social engagement:  Volunteer work   Nutrition:   Smoking Cessation: continue as per [place] group   Therapy:  Continue consistent wake time & early AM light, limited naps   Re-read distortions sheet             Return to clinic: 1 week     Length of Service (minutes direct face-to-face contact): 45    Nando Marie, PhD  [place] License #[id number]

## 2018-05-09 ENCOUNTER — OFFICE VISIT (OUTPATIENT)
Dept: PSYCHIATRY | Facility: CLINIC | Age: 68
End: 2018-05-09
Payer: MEDICARE

## 2018-05-09 DIAGNOSIS — F43.23 ADJUSTMENT DISORDER WITH MIXED ANXIETY AND DEPRESSED MOOD: Primary | ICD-10-CM

## 2018-05-09 PROCEDURE — 99999 PR PBB SHADOW E&M-EST. PATIENT-LVL II: CPT | Mod: PBBFAC,,, | Performed by: PSYCHOLOGIST

## 2018-05-09 PROCEDURE — 90834 PSYTX W PT 45 MINUTES: CPT | Mod: S$GLB,,, | Performed by: PSYCHOLOGIST

## 2018-05-09 NOTE — PROGRESS NOTES
PSYCHO-ONCOLOGY NOTE/ Individual Psychotherapy     Date: 5/9/2018   Site:  [facility]         Therapeutic Intervention: Met with patient.  Outpatient - Behavior modifying psychotherapy 45 min - CPT code 59513    The patient's last visit with me was on 4/27//2018.    Chief complaint/reason for encounter: depression and anxiety   Met with patient to evaluate psychosocial adaptation to diagnosis of myelodysplastic syndrome  Current Medications  Current Outpatient Prescriptions   Medication    acetaminophen (TYLENOL ARTHRITIS PAIN) 650 MG TbSR    aspirin (ECOTRIN) 81 MG EC tablet    b complex vitamins capsule    CETIRIZINE HCL (ZYRTEC ORAL)    fish oil-omega-3 fatty acids 300-1,000 mg capsule    lisinopril-hydrochlorothiazide (PRINZIDE,ZESTORETIC) 20-12.5 mg per tablet    metFORMIN (GLUCOPHAGE-XR) 500 MG 24 hr tablet    NICOTROL 10 mg Crtg    walker Misc     Current Facility-Administered Medications   Medication Frequency    0.9%  NaCl infusion (for blood administration) Q24H PRN    acetaminophen tablet 650 mg 1 time in Clinic/HOD     Facility-Administered Medications Ordered in Other Visits   Medication Frequency    0.9%  NaCl infusion (for blood administration) Once       Objective:  Susan Puente arrived on time for the session.  Ms. Puente was independently ambulatory at the time of session. The patient was fully cooperative throughout the session.  Appearance: age appropriate,  casually dressed, adequately groomed  Behavior/Cooperation: friendly and cooperative  Speech: Not pressured, clearly audible, no slurring  Mood: euthymic  Affect: full range, appropriate to mood  Thought Process: goal-directed, logical  Thought Content: normal,  No delusions or paranoia; did not appear to be responding to internal stimuli during the session  Orientation: grossly intact  Memory: Grossly intact  Attention Span/Concentration: Attends to session without distraction; reports no difficulty  Fund of  Knowledge: average  Estimate of Intelligence: average from verbal skills and history  Cognition: grossly intact  Insight: patient has awareness of illness; good insight into own behavior and behavior of others  Judgment: the patient's behavior is adequate to circumstances    Interval history and content of current session:  Discussed activities since the last visit. Discussed current adaptation to disease and treatment status. Reports to be coping with moderate difficulty. Evaluated cognitive response, paying particular attention to negative intrusive thoughts of a persistent and detrimental nature. Thoughts of this type are in evidence with moderate distress (particularly frustration with physical limitations). Identified and evaluated psychosocial and environmental stressors secondary to diagnosis and treatment.  Focused on providing cognitive behavioral therapy to address negative cognitions. Motivation lapses and fatigue discussed.     Examined proactive behaviors that may be implemented to minimize or ameliorate psychosocial stressors secondary to diagnosis and treatment.  Discussed efforts to increase activity level. Has decreased afternoon naps and kept consistent wake time.  Encouraged return to physical exercise.      Risk parameters:   Patient reports no suicidal ideation  Patient reports no homicidal ideation  Patient reports no self-injurious behavior  Patient reports no violent behavior      Safety needs:  None at this time      Verbal deficits: None     Patient's response to intervention:The patient's response to intervention is motivated.     Progress toward goals and other mental status changes:  The patient's progress toward goals is excellent.      Progress to date:Progress as Expected      Goals from last visit: Attempted, partially met      Patient reported outcomes:  Distress Thermometer:   Distress Score    Distress Score: 4        Practical Problems Physical Problems                                                    Family Problems                                         Emotional Problems                                                         Spiritual/Religions Concerns               Other Problems             PHQ-9= 19 (22 at initial visit)   JASON-7=11 (17 highest score)      Client Strengths: verbal, intelligent, successful, good social support, good insight, commitment to wellness, strong debbi, strong cultural traditions    Diagnosis:     ICD-10-CM ICD-9-CM   1. Adjustment disorder with mixed anxiety and depressed mood F43.23 309.28       Treatment Plan:individual psychotherapy  · Target symptoms: depression, anxiety , adjustment  · Why chosen therapy is appropriate versus another modality: relevant to diagnosis, evidence based practice  · Outcome monitoring methods: self-report, checklist/rating scale  · Therapeutic intervention type: behavior modifying psychotherapy  · Prognosis: Excellent      Behavioral goals:    Exercise:  time at [place] (use rollator to get there, plan for hot tub and lunch, not exercise)   Stress management:   Social engagement:  Volunteer work   Nutrition:   Smoking Cessation: continue as per [place] group   Therapy:  Continue consistent wake time & early AM light, limited naps   Re-read distortions sheet or read Feeling Good   Alternative work activities ([employer], employment agencies)    Costs/benefits of antidepressants          Return to clinic: 1 week     Length of Service (minutes direct face-to-face contact): 45    Nando Marie, PhD  [place] License #[id number]

## 2018-05-16 ENCOUNTER — OFFICE VISIT (OUTPATIENT)
Dept: PSYCHIATRY | Facility: CLINIC | Age: 68
End: 2018-05-16
Payer: MEDICARE

## 2018-05-16 DIAGNOSIS — F43.23 ADJUSTMENT DISORDER WITH MIXED ANXIETY AND DEPRESSED MOOD: Primary | ICD-10-CM

## 2018-05-16 PROCEDURE — 90834 PSYTX W PT 45 MINUTES: CPT | Mod: S$GLB,,, | Performed by: PSYCHOLOGIST

## 2018-05-16 PROCEDURE — 99999 PR PBB SHADOW E&M-EST. PATIENT-LVL II: CPT | Mod: PBBFAC,,, | Performed by: PSYCHOLOGIST

## 2018-05-16 NOTE — PROGRESS NOTES
PSYCHO-ONCOLOGY NOTE/ Individual Psychotherapy     Date: 5/16/2018   Site:  [facility]         Therapeutic Intervention: Met with patient.  Outpatient - Behavior modifying psychotherapy 45 min - CPT code 49444    The patient's last visit with me was on 5/9/2018.    Chief complaint/reason for encounter: depression and anxiety   Met with patient to evaluate psychosocial adaptation to diagnosis of myelodysplastic syndrome  Current Medications  Current Outpatient Prescriptions   Medication    acetaminophen (TYLENOL ARTHRITIS PAIN) 650 MG TbSR    aspirin (ECOTRIN) 81 MG EC tablet    b complex vitamins capsule    CETIRIZINE HCL (ZYRTEC ORAL)    fish oil-omega-3 fatty acids 300-1,000 mg capsule    lisinopril-hydrochlorothiazide (PRINZIDE,ZESTORETIC) 20-12.5 mg per tablet    metFORMIN (GLUCOPHAGE-XR) 500 MG 24 hr tablet    NICOTROL 10 mg Crtg    walker Misc     Current Facility-Administered Medications   Medication Frequency    0.9%  NaCl infusion (for blood administration) Q24H PRN    acetaminophen tablet 650 mg 1 time in Clinic/HOD     Facility-Administered Medications Ordered in Other Visits   Medication Frequency    0.9%  NaCl infusion (for blood administration) Once       Objective:  Susan Puente arrived on time for the session.  Ms. Puente was independently ambulatory at the time of session. The patient was fully cooperative throughout the session.  Appearance: age appropriate,  casually dressed, adequately groomed  Behavior/Cooperation: friendly and cooperative  Speech: Not pressured, clearly audible, no slurring  Mood: euthymic  Affect: full range, appropriate to mood  Thought Process: goal-directed, logical  Thought Content: normal,  No delusions or paranoia; did not appear to be responding to internal stimuli during the session  Orientation: grossly intact  Memory: Grossly intact  Attention Span/Concentration: Attends to session without distraction; reports no difficulty  Fund of  Knowledge: average  Estimate of Intelligence: average from verbal skills and history  Cognition: grossly intact  Insight: patient has awareness of illness; good insight into own behavior and behavior of others  Judgment: the patient's behavior is adequate to circumstances    Interval history and content of current session:  Discussed activities since the last visit. Discussed current adaptation to disease and treatment status. Reports to be coping with moderate difficulty. Evaluated cognitive response, paying particular attention to negative intrusive thoughts of a persistent and detrimental nature. Thoughts of this type are in evidence with moderate distress (particularly frustration with physical limitations and guilt over recent skipped activities). Identified and evaluated psychosocial and environmental stressors secondary to diagnosis and treatment.  Focused on providing cognitive behavioral therapy to address negative cognitions. Motivation lapses and fatigue discussed. Discussed assertive responses to friends.      Risk parameters:   Patient reports no suicidal ideation  Patient reports no homicidal ideation  Patient reports no self-injurious behavior  Patient reports no violent behavior      Safety needs:  None at this time      Verbal deficits: None     Patient's response to intervention:The patient's response to intervention is motivated.     Progress toward goals and other mental status changes:  The patient's progress toward goals is excellent.      Progress to date:Progress as Expected      Goals from last visit: Attempted, partially met      Patient reported outcomes:  Distress Thermometer:   Distress Score    Distress Score: 5        Practical Problems Physical Problems                                                   Family Problems                                         Emotional Problems                                                         Spiritual/Religions Concerns               Other  Problems             PHQ-9= 15 (22 at initial visit)   JASON-7=6 (17 highest score)      Client Strengths: verbal, intelligent, successful, good social support, good insight, commitment to wellness, strong debbi, strong cultural traditions    Diagnosis:     ICD-10-CM ICD-9-CM   1. Adjustment disorder with mixed anxiety and depressed mood F43.23 309.28       Treatment Plan:individual psychotherapy  · Target symptoms: depression, anxiety , adjustment  · Why chosen therapy is appropriate versus another modality: relevant to diagnosis, evidence based practice  · Outcome monitoring methods: self-report, checklist/rating scale  · Therapeutic intervention type: behavior modifying psychotherapy  · Prognosis: Excellent      Behavioral goals:    Exercise:  time at [place] (use rollator to get there, plan for hot tub and lunch, not exercise)   Stress management:   Social engagement:  Volunteer work   Nutrition:   Smoking Cessation: continue as per [place] group   Therapy:  Continue consistent wake time & early AM light, limited naps   Journal about differences between physical limitations and psychologically produced limitations   Alternative work activities (proofreading)         Return to clinic: 1 week     Length of Service (minutes direct face-to-face contact): 45    Nando Marie, PhD  [place] License #[id number]

## 2018-05-23 ENCOUNTER — OFFICE VISIT (OUTPATIENT)
Dept: PSYCHIATRY | Facility: CLINIC | Age: 68
End: 2018-05-23
Payer: MEDICARE

## 2018-05-23 DIAGNOSIS — F43.23 ADJUSTMENT DISORDER WITH MIXED ANXIETY AND DEPRESSED MOOD: Primary | ICD-10-CM

## 2018-05-23 PROCEDURE — 99999 PR PBB SHADOW E&M-EST. PATIENT-LVL II: CPT | Mod: PBBFAC,,, | Performed by: PSYCHOLOGIST

## 2018-05-23 PROCEDURE — 90834 PSYTX W PT 45 MINUTES: CPT | Mod: S$GLB,,, | Performed by: PSYCHOLOGIST

## 2018-05-23 NOTE — PROGRESS NOTES
PSYCHO-ONCOLOGY NOTE/ Individual Psychotherapy     Date: 5/23/2018   Site:  [facility]         Therapeutic Intervention: Met with patient.  Outpatient - Behavior modifying psychotherapy 45 min - CPT code 15465    The patient's last visit with me was on 5/16/2018.    Chief complaint/reason for encounter: depression and anxiety   Met with patient to evaluate psychosocial adaptation to diagnosis of myelodysplastic syndrome  Current Medications  Current Outpatient Prescriptions   Medication    acetaminophen (TYLENOL ARTHRITIS PAIN) 650 MG TbSR    aspirin (ECOTRIN) 81 MG EC tablet    b complex vitamins capsule    CETIRIZINE HCL (ZYRTEC ORAL)    fish oil-omega-3 fatty acids 300-1,000 mg capsule    lisinopril-hydrochlorothiazide (PRINZIDE,ZESTORETIC) 20-12.5 mg per tablet    metFORMIN (GLUCOPHAGE-XR) 500 MG 24 hr tablet    NICOTROL 10 mg Crtg    walker Misc     Current Facility-Administered Medications   Medication Frequency    0.9%  NaCl infusion (for blood administration) Q24H PRN    acetaminophen tablet 650 mg 1 time in Clinic/HOD     Facility-Administered Medications Ordered in Other Visits   Medication Frequency    0.9%  NaCl infusion (for blood administration) Once       Objective:  Ssuan Puente arrived on time for the session.  Ms. Puente was using a wheelchair for mobility at the time of session. The patient was fully cooperative throughout the session.  Appearance: age appropriate,  casually dressed, adequately groomed  Behavior/Cooperation: friendly and cooperative  Speech: Not pressured, clearly audible, no slurring  Mood: euthymic  Affect: full range, appropriate to mood  Thought Process: goal-directed, logical  Thought Content: normal,  No delusions or paranoia; did not appear to be responding to internal stimuli during the session  Orientation: grossly intact  Memory: Grossly intact  Attention Span/Concentration: Attends to session without distraction; reports no difficulty  Fund  "of Knowledge: average  Estimate of Intelligence: average from verbal skills and history  Cognition: grossly intact  Insight: patient has awareness of illness; good insight into own behavior and behavior of others  Judgment: the patient's behavior is adequate to circumstances    Interval history and content of current session:  Discussed activities since the last visit. Discussed current adaptation to disease and treatment status. Reports to be coping with moderate difficulty.  Patient states she had a "weepy week" and struggled with significant fatigue.  Evaluated cognitive response, paying particular attention to negative intrusive thoughts of a persistent and detrimental nature. Thoughts of this type are in evidence with moderate distress (particularly frustration with physical limitations and guilt over recent skipped activities). Identified and evaluated psychosocial and environmental stressors secondary to diagnosis and treatment.  Focused on providing cognitive behavioral therapy to address negative cognitions. Seeing Hillary Valdes/Clyde Monge again next week.     Risk parameters:   Patient reports no suicidal ideation  Patient reports no homicidal ideation  Patient reports no self-injurious behavior  Patient reports no violent behavior      Safety needs:  None at this time      Verbal deficits: None     Patient's response to intervention:The patient's response to intervention is motivated.     Progress toward goals and other mental status changes:  The patient's progress toward goals is excellent.      Progress to date:Progress as Expected      Goals from last visit: Attempted, partially met      Patient reported outcomes:  Distress Thermometer:   Distress Score    Distress Score: 4        Practical Problems Physical Problems                                                   Family Problems                                         Emotional Problems                                                       "   Spiritual/Religions Concerns               Other Problems             PHQ-9= 15 (22 at initial visit)   JASON-7=10 (17 highest score)      Client Strengths: verbal, intelligent, successful, good social support, good insight, commitment to wellness, strong debbi, strong cultural traditions    Diagnosis:     ICD-10-CM ICD-9-CM   1. Adjustment disorder with mixed anxiety and depressed mood F43.23 309.28       Treatment Plan:individual psychotherapy  · Target symptoms: depression, anxiety , adjustment  · Why chosen therapy is appropriate versus another modality: relevant to diagnosis, evidence based practice  · Outcome monitoring methods: self-report, checklist/rating scale  · Therapeutic intervention type: behavior modifying psychotherapy  · Prognosis: Excellent      Behavioral goals:    Exercise:  time at [place] (use rollator to get there, plan for hot tub and lunch, not exercise)   Stress management:  Outside in yard in AM, avoid AM recliner   Social engagement:  Volunteer work, increased contact with friends   Nutrition:   Smoking Cessation: continue as per [place] group   Therapy:  Continue consistent wake time & early AM light, limited naps   Journal about differences between physical limitations and psychologically produced limitations   Alternative work activities (proofreading)         Return to clinic: 1 week     Length of Service (minutes direct face-to-face contact): 45    Nando Marie, PhD  [place] License #[id number]

## 2018-05-29 ENCOUNTER — TELEPHONE (OUTPATIENT)
Dept: HEMATOLOGY/ONCOLOGY | Facility: CLINIC | Age: 68
End: 2018-05-29

## 2018-05-29 ENCOUNTER — OFFICE VISIT (OUTPATIENT)
Dept: HEMATOLOGY/ONCOLOGY | Facility: CLINIC | Age: 68
End: 2018-05-29
Payer: MEDICARE

## 2018-05-29 ENCOUNTER — LAB VISIT (OUTPATIENT)
Dept: LAB | Facility: HOSPITAL | Age: 68
End: 2018-05-29
Payer: MEDICARE

## 2018-05-29 VITALS
HEIGHT: 66 IN | RESPIRATION RATE: 20 BRPM | WEIGHT: 210.13 LBS | BODY MASS INDEX: 33.77 KG/M2 | HEART RATE: 101 BPM | TEMPERATURE: 99 F | OXYGEN SATURATION: 96 % | DIASTOLIC BLOOD PRESSURE: 61 MMHG | SYSTOLIC BLOOD PRESSURE: 153 MMHG

## 2018-05-29 DIAGNOSIS — D46.9 MDS (MYELODYSPLASTIC SYNDROME): ICD-10-CM

## 2018-05-29 DIAGNOSIS — D64.9 ANEMIA, UNSPECIFIED TYPE: ICD-10-CM

## 2018-05-29 DIAGNOSIS — F32.A DEPRESSION, UNSPECIFIED DEPRESSION TYPE: ICD-10-CM

## 2018-05-29 DIAGNOSIS — D46.9 MDS (MYELODYSPLASTIC SYNDROME): Primary | ICD-10-CM

## 2018-05-29 DIAGNOSIS — D46.C MDS (MYELODYSPLASTIC SYNDROME) WITH 5Q DELETION: ICD-10-CM

## 2018-05-29 DIAGNOSIS — D46.9 MYELODYSPLASTIC SYNDROME: Primary | ICD-10-CM

## 2018-05-29 LAB
ALBUMIN SERPL BCP-MCNC: 4.1 G/DL
ALP SERPL-CCNC: 69 U/L
ALT SERPL W/O P-5'-P-CCNC: 50 U/L
ANION GAP SERPL CALC-SCNC: 10 MMOL/L
ANISOCYTOSIS BLD QL SMEAR: ABNORMAL
AST SERPL-CCNC: 27 U/L
BASOPHILS # BLD AUTO: ABNORMAL K/UL
BASOPHILS NFR BLD: 1 %
BILIRUB SERPL-MCNC: 0.8 MG/DL
BUN SERPL-MCNC: 22 MG/DL
CALCIUM SERPL-MCNC: 10.9 MG/DL
CHLORIDE SERPL-SCNC: 104 MMOL/L
CO2 SERPL-SCNC: 26 MMOL/L
CREAT SERPL-MCNC: 1.1 MG/DL
DIFFERENTIAL METHOD: ABNORMAL
EOSINOPHIL # BLD AUTO: ABNORMAL K/UL
EOSINOPHIL NFR BLD: 3 %
ERYTHROCYTE [DISTWIDTH] IN BLOOD BY AUTOMATED COUNT: 16.9 %
EST. GFR  (AFRICAN AMERICAN): >60 ML/MIN/1.73 M^2
EST. GFR  (NON AFRICAN AMERICAN): 52.1 ML/MIN/1.73 M^2
GLUCOSE SERPL-MCNC: 152 MG/DL
HCT VFR BLD AUTO: 26.4 %
HGB BLD-MCNC: 8.4 G/DL
HYPOCHROMIA BLD QL SMEAR: ABNORMAL
IMM GRANULOCYTES # BLD AUTO: ABNORMAL K/UL
IMM GRANULOCYTES NFR BLD AUTO: ABNORMAL %
LDH SERPL L TO P-CCNC: 181 U/L
LYMPHOCYTES # BLD AUTO: ABNORMAL K/UL
LYMPHOCYTES NFR BLD: 25 %
MCH RBC QN AUTO: 42 PG
MCHC RBC AUTO-ENTMCNC: 31.8 G/DL
MCV RBC AUTO: 132 FL
MONOCYTES # BLD AUTO: ABNORMAL K/UL
MONOCYTES NFR BLD: 4 %
NEUTROPHILS NFR BLD: 67 %
NRBC BLD-RTO: 0 /100 WBC
OVALOCYTES BLD QL SMEAR: ABNORMAL
PLATELET # BLD AUTO: 263 K/UL
PMV BLD AUTO: 11.3 FL
POIKILOCYTOSIS BLD QL SMEAR: SLIGHT
POLYCHROMASIA BLD QL SMEAR: ABNORMAL
POTASSIUM SERPL-SCNC: 4.3 MMOL/L
PROT SERPL-MCNC: 7.3 G/DL
RBC # BLD AUTO: 2 M/UL
SODIUM SERPL-SCNC: 140 MMOL/L
WBC # BLD AUTO: 4.35 K/UL

## 2018-05-29 PROCEDURE — 36415 COLL VENOUS BLD VENIPUNCTURE: CPT

## 2018-05-29 PROCEDURE — 83615 LACTATE (LD) (LDH) ENZYME: CPT

## 2018-05-29 PROCEDURE — 80053 COMPREHEN METABOLIC PANEL: CPT

## 2018-05-29 PROCEDURE — 99999 PR PBB SHADOW E&M-EST. PATIENT-LVL III: CPT | Mod: PBBFAC,,, | Performed by: INTERNAL MEDICINE

## 2018-05-29 PROCEDURE — 99215 OFFICE O/P EST HI 40 MIN: CPT | Mod: S$GLB,,, | Performed by: INTERNAL MEDICINE

## 2018-05-29 PROCEDURE — 85007 BL SMEAR W/DIFF WBC COUNT: CPT

## 2018-05-29 PROCEDURE — 3078F DIAST BP <80 MM HG: CPT | Mod: CPTII,S$GLB,, | Performed by: INTERNAL MEDICINE

## 2018-05-29 PROCEDURE — 3077F SYST BP >= 140 MM HG: CPT | Mod: CPTII,S$GLB,, | Performed by: INTERNAL MEDICINE

## 2018-05-29 PROCEDURE — 85027 COMPLETE CBC AUTOMATED: CPT

## 2018-05-29 RX ORDER — CITALOPRAM 20 MG/1
20 TABLET, FILM COATED ORAL DAILY
Qty: 30 TABLET | Refills: 2 | Status: SHIPPED | OUTPATIENT
Start: 2018-05-29 | End: 2018-08-29 | Stop reason: SDUPTHER

## 2018-05-29 NOTE — PROGRESS NOTES
"Subjective:       Patient ID: Susan Puente is a 67 y.o. female.    Chief Complaint: Myelodysplastic syndrome    Patient presents today for follow up of her history of MDS 5 q del syndrome.  Revlimid has been stopped since June 2017 after she developed pancytopenia while on Revlimid (required desensitization). CBC was normal for almost 1 year and marrow was negative for del5q. Susan feels increased fatigue today- like her "hemoglobin is 8grams". CBC has hemoglobin of 8.5 grams.  She is now seeing Dr. Murrieta and appreciates these consultation. She feels ready to start an antidepressant. She has been on both Celexa and Prozac before. She felt better on Celexa.     History   Ms. Puente is a 66 year old female with hx of DM2, peripheral vascular disease, tobacco use, CAD, hyperlipidemia, hypertension who was hospitalized 11/10/16 for anemia. hgb 5.3  MCH 43.4 with normal WBC and platelets. Patient had normal iron stores. She was transfused 3 units of PRBCs and discharged home with hgb 8.7 on 11/11/16. She was referred for further evalutation of her anemia. On 12/16/16 patient had a normal SPEP and immunofixation. Slightly elevated kappa light chains with normal ration. Elevated vitamin b12, normal folate, JAYDEN was positive with a low titer and negative profile. Patient had a bone marrow biopsy 1/5/16 which showed the core biopsy is normocellular for age (40%); however, megakaryocytes are increased and show  frequent small, hypolobated forms. Additionally, a subset of the neutrophils are hypogranular. Blasts are not increased by either morphology (1.2%) or in the corresponding flow cytometric analysis. Fish detects a 5q deletion in 54.5% of nuclei. Cytogenetics reported 20 metaphases, 2 metaphases were normal and 18 metaphases had a 5q deletion. No additional cytogenetic abnormalities were detected. Findings consistent with 5q deletion syndrome.     Patient had a delay in obtaining revlimid 10 mg daily " but did start taking the medication 2/8/17. On 2/9/17 patient developed a diffuse maculopapular rash throughout scalp arms, legs and torso. She states she had no stridor or wheezing. She discontinued medication 2/15/17. Went to allergist who provided references on desensitization so that patient could resume Revlimid. Unfortunately developed pancytopenia and Revlimid stopped 6/16/17.   She had a repeat BMBX  performed 6/8/17 and showed a hypercellular marrow (60%) continued atypia in the granulocytes and megakaryocytes were noted.  Additionally, there is erythroid atypia present. No increase in blasts. Cytogenetics are normal and MDS FISH is negative, failing to show 5 q minus. NGS should no significant molecular mutations.  Anemia work up revealed bienvenido negative hemolysis.      Review of Systems   Constitutional: Positive for fatigue. Negative for fever.   HENT: Negative for congestion and trouble swallowing.    Respiratory: Positive for shortness of breath. Negative for cough.    Cardiovascular: Negative for chest pain and leg swelling.   Gastrointestinal: Negative for abdominal distention and abdominal pain.   Endocrine: Negative for polyphagia and polyuria.   Genitourinary: Negative for dysuria and urgency.   Musculoskeletal: Positive for arthralgias. Negative for myalgias.   Skin: Negative for color change and pallor.   Neurological: Negative for light-headedness and headaches.   Hematological: Negative for adenopathy. Does not bruise/bleed easily.   Psychiatric/Behavioral: Negative for agitation and behavioral problems.       Objective:      Physical Exam   Constitutional: She is oriented to person, place, and time. She appears well-developed and well-nourished.   HENT:   Mouth/Throat: Oropharynx is clear and moist. No oropharyngeal exudate.   Eyes: Conjunctivae are normal. Pupils are equal, round, and reactive to light.   Cardiovascular: Normal rate and regular rhythm.    Pulmonary/Chest: Effort normal and  breath sounds normal.   Abdominal: Soft. Bowel sounds are normal.   Musculoskeletal: Normal range of motion.   Lymphadenopathy:     She has no cervical adenopathy.   Neurological: She is alert and oriented to person, place, and time.   Skin: Skin is warm and dry.   Psychiatric: She has a normal mood and affect. Her behavior is normal.       Assessment:       1. MDS (myelodysplastic syndrome)    2. Depression, unspecified depression type    3. Anemia, unspecified type        Plan:   MDS. Initially with 5 q minus syndrome and treated with Revlimid. Developed allergy and was then desensitized. Soon after developed pancytopenia and Revlimid held since June 2017.  BMBX,6/8/17, had normal cytogenetics.    New anemia on CBC today. Bone marrow biopsy in OR 6/7/18.    Return visit with CBC and type and screen 6/14/18

## 2018-05-30 ENCOUNTER — OFFICE VISIT (OUTPATIENT)
Dept: PSYCHIATRY | Facility: CLINIC | Age: 68
End: 2018-05-30
Payer: MEDICARE

## 2018-05-30 DIAGNOSIS — F43.23 ADJUSTMENT DISORDER WITH MIXED ANXIETY AND DEPRESSED MOOD: Primary | ICD-10-CM

## 2018-05-30 PROCEDURE — 99999 PR PBB SHADOW E&M-EST. PATIENT-LVL I: CPT | Mod: PBBFAC,,, | Performed by: PSYCHOLOGIST

## 2018-05-30 PROCEDURE — 90834 PSYTX W PT 45 MINUTES: CPT | Mod: S$GLB,,, | Performed by: PSYCHOLOGIST

## 2018-05-30 NOTE — PROGRESS NOTES
PSYCHO-ONCOLOGY NOTE/ Individual Psychotherapy     Date: 5/30/2018   Site:  [facility]         Therapeutic Intervention: Met with patient.  Outpatient - Behavior modifying psychotherapy 45 min - CPT code 50589    The patient's last visit with me was on 5/23/2018.    Chief complaint/reason for encounter: depression and anxiety   Met with patient to evaluate psychosocial adaptation to diagnosis/treatment of myelodysplastic syndrome  Current Medications  Current Outpatient Prescriptions   Medication    acetaminophen (TYLENOL ARTHRITIS PAIN) 650 MG TbSR    aspirin (ECOTRIN) 81 MG EC tablet    b complex vitamins capsule    CETIRIZINE HCL (ZYRTEC ORAL)    citalopram (CELEXA) 20 MG tablet    fish oil-omega-3 fatty acids 300-1,000 mg capsule    lisinopril-hydrochlorothiazide (PRINZIDE,ZESTORETIC) 20-12.5 mg per tablet    metFORMIN (GLUCOPHAGE-XR) 500 MG 24 hr tablet    NICOTROL 10 mg Crtg    walker Misc     Current Facility-Administered Medications   Medication Frequency    0.9%  NaCl infusion (for blood administration) Q24H PRN    acetaminophen tablet 650 mg 1 time in Clinic/HOD     Facility-Administered Medications Ordered in Other Visits   Medication Frequency    0.9%  NaCl infusion (for blood administration) Once       Objective:  Susan Puente arrived on time for the session.  Ms. Puente was using a wheelchair for mobility at the time of session. The patient was fully cooperative throughout the session.  Appearance: age appropriate,  casually dressed, adequately groomed  Behavior/Cooperation: friendly and cooperative  Speech: Not pressured, clearly audible, no slurring  Mood: euthymic  Affect: full range, appropriate to mood  Thought Process: goal-directed, logical  Thought Content: normal,  No delusions or paranoia; did not appear to be responding to internal stimuli during the session  Orientation: grossly intact  Memory: Grossly intact  Attention Span/Concentration: Attends to session  without distraction; reports no difficulty  Fund of Knowledge: average  Estimate of Intelligence: average from verbal skills and history  Cognition: grossly intact  Insight: patient has awareness of illness; good insight into own behavior and behavior of others  Judgment: the patient's behavior is adequate to circumstances    Interval history and content of current session:  Discussed activities since the last visit. Discussed current adaptation to disease and treatment status. Reports to be coping with moderate difficulty with reality of likely relapse.  Patient does feel vindicated in her recent increased fatigue.  Discussed emotional and physical management strategies with possible imminent treatment.     Discussed antidepressant with Dr. Valdes and started Celexa yesterday.     Risk parameters:   Patient reports no suicidal ideation  Patient reports no homicidal ideation  Patient reports no self-injurious behavior  Patient reports no violent behavior      Safety needs:  None at this time      Verbal deficits: None     Patient's response to intervention:The patient's response to intervention is motivated.     Progress toward goals and other mental status changes:  The patient's progress toward goals is excellent.      Progress to date:Progress as Expected      Goals from last visit: Attempted, partially met      Patient reported outcomes:  Distress Thermometer:   Distress Score    Distress Score: 4        Practical Problems Physical Problems                                                   Family Problems                                         Emotional Problems                                                         Spiritual/Religions Concerns               Other Problems             PHQ-9= 16 (22 at initial visit)   JASON-7=12 (17 highest score)      Client Strengths: verbal, intelligent, successful, good social support, good insight, commitment to wellness, strong debbi, strong cultural traditions    Diagnosis:      ICD-10-CM ICD-9-CM   1. Adjustment disorder with mixed anxiety and depressed mood F43.23 309.28       Treatment Plan:individual psychotherapy  · Target symptoms: depression, anxiety , adjustment  · Why chosen therapy is appropriate versus another modality: relevant to diagnosis, evidence based practice  · Outcome monitoring methods: self-report, checklist/rating scale  · Therapeutic intervention type: behavior modifying psychotherapy  · Prognosis: Excellent      Behavioral goals:    Exercise:  time at [place]    Stress management:  Talk to nurse  from [facility]   Social engagement:  Volunteer work, increased contact with friends   Nutrition:  Meal freezing,, diabetic meal plan, Ole Cortes   Smoking Cessation: continue as per [place] group   Therapy:  Continue consistent wake time & early AM light, limited naps       Return to clinic: 1 week  (if not having biopsy during this time)     Length of Service (minutes direct face-to-face contact): 45    Nando Marie, PhD  [place] License #[id number]

## 2018-06-06 ENCOUNTER — TELEPHONE (OUTPATIENT)
Dept: HEMATOLOGY/ONCOLOGY | Facility: CLINIC | Age: 68
End: 2018-06-06

## 2018-06-06 NOTE — TELEPHONE ENCOUNTER
Attempted to call patient. Phone went straight to voice mail but could not leave a message.      ----- Message from Charisse Iraheta sent at 6/6/2018  1:24 PM CDT -----  Contact: Pt  Pt has questions regarding her scheduled procedure    Pt can be reached at [phone]    Thanks

## 2018-06-07 ENCOUNTER — ANESTHESIA EVENT (OUTPATIENT)
Dept: SURGERY | Facility: HOSPITAL | Age: 68
End: 2018-06-07
Payer: MEDICARE

## 2018-06-07 ENCOUNTER — HOSPITAL ENCOUNTER (OUTPATIENT)
Facility: HOSPITAL | Age: 68
Discharge: HOME | End: 2018-06-07
Attending: INTERNAL MEDICINE | Admitting: INTERNAL MEDICINE
Payer: MEDICARE

## 2018-06-07 ENCOUNTER — ANESTHESIA (OUTPATIENT)
Dept: SURGERY | Facility: HOSPITAL | Age: 68
End: 2018-06-07
Payer: MEDICARE

## 2018-06-07 ENCOUNTER — SURGERY (OUTPATIENT)
Age: 68
End: 2018-06-07

## 2018-06-07 VITALS
BODY MASS INDEX: 34.82 KG/M2 | SYSTOLIC BLOOD PRESSURE: 125 MMHG | WEIGHT: 209 LBS | HEART RATE: 82 BPM | DIASTOLIC BLOOD PRESSURE: 63 MMHG | HEIGHT: 65 IN | OXYGEN SATURATION: 94 % | RESPIRATION RATE: 18 BRPM | TEMPERATURE: 98 F

## 2018-06-07 DIAGNOSIS — D46.9 MYELODYSPLASTIC SYNDROME: Primary | ICD-10-CM

## 2018-06-07 DIAGNOSIS — D46.9 MDS (MYELODYSPLASTIC SYNDROME): ICD-10-CM

## 2018-06-07 DIAGNOSIS — D46.C MDS (MYELODYSPLASTIC SYNDROME) WITH 5Q DELETION: ICD-10-CM

## 2018-06-07 LAB
BONE MARROW WRIGHT STAIN COMMENT: NORMAL
POCT GLUCOSE: 94 MG/DL (ref 70–110)

## 2018-06-07 PROCEDURE — 25000003 PHARM REV CODE 250: Performed by: INTERNAL MEDICINE

## 2018-06-07 PROCEDURE — 88189 FLOWCYTOMETRY/READ 16 & >: CPT | Mod: ,,, | Performed by: PATHOLOGY

## 2018-06-07 PROCEDURE — 38222 DX BONE MARROW BX & ASPIR: CPT | Mod: LT,,, | Performed by: NURSE PRACTITIONER

## 2018-06-07 PROCEDURE — 88264 CHROMOSOME ANALYSIS 20-25: CPT

## 2018-06-07 PROCEDURE — 82962 GLUCOSE BLOOD TEST: CPT | Performed by: INTERNAL MEDICINE

## 2018-06-07 PROCEDURE — 88313 SPECIAL STAINS GROUP 2: CPT | Mod: 26,,, | Performed by: PATHOLOGY

## 2018-06-07 PROCEDURE — 88299 UNLISTED CYTOGENETIC STUDY: CPT

## 2018-06-07 PROCEDURE — 88237 TISSUE CULTURE BONE MARROW: CPT

## 2018-06-07 PROCEDURE — 88271 CYTOGENETICS DNA PROBE: CPT | Mod: 59

## 2018-06-07 PROCEDURE — 88275 CYTOGENETICS 100-300: CPT

## 2018-06-07 PROCEDURE — 88311 DECALCIFY TISSUE: CPT | Mod: 26,,, | Performed by: PATHOLOGY

## 2018-06-07 PROCEDURE — 88305 TISSUE EXAM BY PATHOLOGIST: CPT | Mod: 26,,, | Performed by: PATHOLOGY

## 2018-06-07 PROCEDURE — 71000044 HC DOSC ROUTINE RECOVERY FIRST HOUR: Performed by: INTERNAL MEDICINE

## 2018-06-07 PROCEDURE — 88185 FLOWCYTOMETRY/TC ADD-ON: CPT | Mod: 59 | Performed by: PATHOLOGY

## 2018-06-07 PROCEDURE — 88342 IMHCHEM/IMCYTCHM 1ST ANTB: CPT | Mod: 26,59,, | Performed by: PATHOLOGY

## 2018-06-07 PROCEDURE — 37000008 HC ANESTHESIA 1ST 15 MINUTES: Performed by: INTERNAL MEDICINE

## 2018-06-07 PROCEDURE — 71000015 HC POSTOP RECOV 1ST HR: Performed by: INTERNAL MEDICINE

## 2018-06-07 PROCEDURE — 88341 IMHCHEM/IMCYTCHM EA ADD ANTB: CPT | Mod: 26,59,, | Performed by: PATHOLOGY

## 2018-06-07 PROCEDURE — 88275 CYTOGENETICS 100-300: CPT | Mod: 59

## 2018-06-07 PROCEDURE — 88313 SPECIAL STAINS GROUP 2: CPT

## 2018-06-07 PROCEDURE — D9220A PRA ANESTHESIA: Mod: ,,, | Performed by: ANESTHESIOLOGY

## 2018-06-07 PROCEDURE — 85097 BONE MARROW INTERPRETATION: CPT | Mod: ,,, | Performed by: PATHOLOGY

## 2018-06-07 PROCEDURE — 88184 FLOWCYTOMETRY/ TC 1 MARKER: CPT | Performed by: PATHOLOGY

## 2018-06-07 PROCEDURE — 36000704 HC OR TIME LEV I 1ST 15 MIN: Performed by: INTERNAL MEDICINE

## 2018-06-07 PROCEDURE — 88271 CYTOGENETICS DNA PROBE: CPT

## 2018-06-07 PROCEDURE — 88313 SPECIAL STAINS GROUP 2: CPT | Performed by: PATHOLOGY

## 2018-06-07 PROCEDURE — 63600175 PHARM REV CODE 636 W HCPCS: Performed by: NURSE ANESTHETIST, CERTIFIED REGISTERED

## 2018-06-07 RX ORDER — PROPOFOL 10 MG/ML
VIAL (ML) INTRAVENOUS
Status: DISCONTINUED | OUTPATIENT
Start: 2018-06-07 | End: 2018-06-07

## 2018-06-07 RX ORDER — SODIUM CHLORIDE 9 MG/ML
INJECTION, SOLUTION INTRAVENOUS CONTINUOUS
Status: DISCONTINUED | OUTPATIENT
Start: 2018-06-07 | End: 2018-06-07 | Stop reason: HOSPADM

## 2018-06-07 RX ORDER — LIDOCAINE HYDROCHLORIDE 10 MG/ML
INJECTION, SOLUTION EPIDURAL; INFILTRATION; INTRACAUDAL; PERINEURAL
Status: DISCONTINUED | OUTPATIENT
Start: 2018-06-07 | End: 2018-06-07 | Stop reason: HOSPADM

## 2018-06-07 RX ORDER — PROPOFOL 10 MG/ML
VIAL (ML) INTRAVENOUS CONTINUOUS PRN
Status: DISCONTINUED | OUTPATIENT
Start: 2018-06-07 | End: 2018-06-07

## 2018-06-07 RX ORDER — LIDOCAINE HCL/PF 100 MG/5ML
SYRINGE (ML) INTRAVENOUS
Status: DISCONTINUED | OUTPATIENT
Start: 2018-06-07 | End: 2018-06-07

## 2018-06-07 RX ORDER — LIDOCAINE HYDROCHLORIDE 10 MG/ML
1 INJECTION, SOLUTION EPIDURAL; INFILTRATION; INTRACAUDAL; PERINEURAL ONCE
Status: COMPLETED | OUTPATIENT
Start: 2018-06-07 | End: 2018-06-07

## 2018-06-07 RX ADMIN — LIDOCAINE HYDROCHLORIDE 5 ML: 10 INJECTION, SOLUTION EPIDURAL; INFILTRATION; INTRACAUDAL; PERINEURAL at 02:06

## 2018-06-07 RX ADMIN — SODIUM CHLORIDE 1000 ML: 0.9 INJECTION, SOLUTION INTRAVENOUS at 02:06

## 2018-06-07 RX ADMIN — PROPOFOL 60 MG: 10 INJECTION, EMULSION INTRAVENOUS at 02:06

## 2018-06-07 RX ADMIN — LIDOCAINE HYDROCHLORIDE 0.2 MG: 10 INJECTION, SOLUTION EPIDURAL; INFILTRATION; INTRACAUDAL; PERINEURAL at 02:06

## 2018-06-07 RX ADMIN — LIDOCAINE HYDROCHLORIDE 50 MG: 20 INJECTION, SOLUTION INTRAVENOUS at 02:06

## 2018-06-07 RX ADMIN — PROPOFOL 150 MCG/KG/MIN: 10 INJECTION, EMULSION INTRAVENOUS at 02:06

## 2018-06-07 NOTE — TRANSFER OF CARE
"Anesthesia Transfer of Care Note    Patient: Susan Puente    Procedure(s) Performed: Procedure(s) (LRB):  Biopsy-bone marrow (Left)    Patient location: PACU    Anesthesia Type: general    Transport from OR: Transported from OR on 6-10 L/min O2 by face mask with adequate spontaneous ventilation    Post pain: adequate analgesia    Post assessment: no apparent anesthetic complications and tolerated procedure well    Post vital signs: stable    Level of consciousness: awake, oriented and alert    Nausea/Vomiting: no nausea/vomiting    Complications: none    Transfer of care protocol was followed      Last vitals:   Visit Vitals  /63 (BP Location: Right arm, Patient Position: Lying)   Pulse 91   Temp 36.6 °C (97.8 °F) (Oral)   Resp 16   Ht 5' 5" (1.651 m)   Wt 94.8 kg (209 lb)   SpO2 96%   Breastfeeding? No   BMI 34.78 kg/m²     "

## 2018-06-07 NOTE — DISCHARGE INSTRUCTIONS
Discharge instructions for having a Bone Marrow Aspiration / Biopsy    Keep Bandage in place for 24 hours.  - Do not shower or take a tube bath during this time. (you may sponge bathe).  - Call the nurse or physician for excessive bleeding or pain at biopsy site.  - You may take Tylenol as needed for pain.    You have received medication to sedate you.  - Do not drive a car or operate heavy machinery for the rest of the day.  - You may resume other activities as tolerated.    You can call [phone] for any problems during the hours of 8:00 AM-5:00PM.    For an emergency after 5:00 PM you can call [phone] and have the  page the Hematologist / Oncologist on call.

## 2018-06-07 NOTE — PLAN OF CARE
Discharge instructions given to patient. Educated patient on procedure and post op instructions, medications and when to follow up within designated time frame. Patient verbalized understanding. Patient denies pain and nausea at this time. PO fluids tolerated.

## 2018-06-08 LAB
BODY SITE - BONE MARROW: NORMAL
BONE MARROW IRON STAIN COMMENT: NORMAL
CLINICAL DIAGNOSIS - BONE MARROW: NORMAL
FLOW CYTOMETRY ANTIBODIES ANALYZED - BONE MARROW: NORMAL
FLOW CYTOMETRY COMMENT - BONE MARROW: NORMAL
FLOW CYTOMETRY INTERPRETATION - BONE MARROW: NORMAL

## 2018-06-08 NOTE — BRIEF OP NOTE
PROCEDURE NOTE:  Date of procedure: 6/7/2018  Bone Marrow aspiration and biopsy  Indication: restaging MDS  Consent: Informed consent was obtained from patient.  Timeout: Done and documented.  Position: right lateral  Site: Left posterior illiac crest.  Prep: Betadine.  Needle used: 11 gauge Jamshidi needle.  Anesthetic: 1% lidocaine 5 cc.  Biopsy: The biopsy needle was introduced into the marrow cavity and 15 cc's of aspirate was obtained without complications and sent for flow, cytogenetics, FISH and DNA hold. Core biopsy obtained without difficulty and sent for routine histologic examination.  Complications: None.  EBL: minimal  Disposition: The patient was discharged home anaesthesia protocol.    Beatriz King NP  Hematology/BMT

## 2018-06-08 NOTE — DISCHARGE SUMMARY
[facility]  Hematology  Bone Marrow Transplant  Discharge Summary      Patient Name: Susan Puente  MRN: [id number]  Admission Date: 6/7/2018  Hospital Length of Stay: 0 days  Discharge Date and Time: 6/7/2018  3:25 PM  Attending Physician: No att. providers found   Discharging Provider: Beatriz King NP  Primary Care Provider: Claudia Rapp MD    HPI: restaging of MDS    Procedure(s) (LRB):  Biopsy-bone marrow (Left)     Hospital Course: Patient admitted to pre op today for a bone marrow aspiration and biopsy. Confirmed that consent was previously done for a bone marrow biopsy. Patient was sedated per anesthesia and a bone marrow biopsy and aspiration was performed in the OR (see Op note). Patient was then transferred to post op and discharged home when appropriate per anesthesia.       Pending Diagnostic Studies:     Procedure Component Value Units Date/Time    Heme Disorders DNA/RNA Hold, Bone Marrow [[id number]] Collected:  06/07/18 1417    Order Status:  Sent Lab Status:  In process Updated:  06/07/18 1612    Specimen:  Bone Marrow from Bone Marrow     Iron Stain, Bone Marrow [[id number]] Collected:  06/07/18 1417    Order Status:  Sent Lab Status:  In process Updated:  06/07/18 1456    Specimen:  Bone Marrow from Bone Marrow     Tissue Specimen to Pathology, Bone Marrow Aspiration/Biopsy Procedure [[id number]] Collected:  06/07/18 1421    Order Status:  Sent Lab Status:  In process Updated:  06/07/18 1634    Specimen:  Bone Marrow from Bone Marrow Aspirate, Left Iliac Crest         Final Active Diagnoses:    Diagnosis Date Noted POA    MDS (myelodysplastic syndrome) [D46.9] 06/07/2018 Yes      Problems Resolved During this Admission:    Diagnosis Date Noted Date Resolved POA      Discharged Condition: good    Disposition: Home or Self Care    Follow Up: on 6/14 with Dr. Valdes as scheduled    Patient Instructions:     Activity as tolerated     Notify your health care provider if  you experience any of the following:  temperature >100.4     Notify your health care provider if you experience any of the following:  persistent nausea and vomiting or diarrhea     Notify your health care provider if you experience any of the following:  severe uncontrolled pain     Notify your health care provider if you experience any of the following:  redness, tenderness, or signs of infection (pain, swelling, redness, odor or green/yellow discharge around incision site)     Notify your health care provider if you experience any of the following:  difficulty breathing or increased cough     Notify your health care provider if you experience any of the following:  persistent dizziness, light-headedness, or visual disturbances     Remove dressing in 24 hours       Medications:  Reconciled Home Medications:      Medication List      ASK your doctor about these medications    aspirin 81 MG EC tablet  Commonly known as:  ECOTRIN  Take 81 mg by mouth once daily.     b complex vitamins capsule  Take 1 capsule by mouth once daily.     citalopram 20 MG tablet  Commonly known as:  CeleXA  Take 1 tablet (20 mg total) by mouth once daily.     fish oil-omega-3 fatty acids 300-1,000 mg capsule  Take 4 g by mouth nightly.     lisinopril-hydrochlorothiazide 20-12.5 mg per tablet  Commonly known as:  PRINZIDE,ZESTORETIC  Take 2 tablets by mouth once daily.     metFORMIN 500 MG 24 hr tablet  Commonly known as:  GLUCOPHAGE-XR  Take 1 tablet (500 mg total) by mouth daily with breakfast.     NICOTROL 10 mg Crtg  Generic drug:  nicotine  INHALE ONE PUFF INTO THE MOUTH AS NEEDED MAXIMUM 6 CARTRIDGES/DAY.     TYLENOL ARTHRITIS PAIN 650 MG Tbsr  Generic drug:  acetaminophen  Take 650 mg by mouth every 8 (eight) hours as needed (for pain).     walker Misc  1 each by Misc.(Non-Drug; Combo Route) route once daily at 6am.     ZYRTEC ORAL  Take by mouth.            Beatriz King NP  Bone Marrow Transplant  [facility]  [place]

## 2018-06-08 NOTE — ANESTHESIA POSTPROCEDURE EVALUATION
"Anesthesia Post Evaluation    Patient: Susan Puente    Procedure(s) Performed: Procedure(s) (LRB):  Biopsy-bone marrow (Left)    Final Anesthesia Type: general  Patient location during evaluation: PACU  Level of consciousness: awake and alert  Post-procedure vital signs: reviewed and stable  Pain management: adequate  Airway patency: patent  PONV status at discharge: No PONV  Anesthetic complications: no      Cardiovascular status: blood pressure returned to baseline  Respiratory status: unassisted and spontaneous ventilation  Hydration status: euvolemic  Follow-up not needed.        Visit Vitals  /63   Pulse 82   Temp 36.5 °C (97.7 °F) (Temporal)   Resp 18   Ht 5' 5" (1.651 m)   Wt 94.8 kg (209 lb)   SpO2 (!) 94%   Breastfeeding? No   BMI 34.78 kg/m²       Pain/Caitlyn Score: Pain Assessment Performed: Yes (6/7/2018  3:12 PM)  Presence of Pain: denies (6/7/2018  3:12 PM)  Pain Rating Prior to Med Admin: 0 (6/7/2018  3:12 PM)  Pain Rating Post Med Admin: 0 (6/7/2018  3:12 PM)  Caitlyn Score: 10 (6/7/2018  3:11 PM)      "

## 2018-06-08 NOTE — ANESTHESIA PREPROCEDURE EVALUATION
06/08/2018  Susan Puente is a 67 y.o., female.      Pre-operative evaluation for Procedure(s) (LRB):  Biopsy-bone marrow (Left)    Susan Puente is a 67 y.o. female     Patient Active Problem List   Diagnosis    Controlled type 2 diabetes mellitus with stage 3 chronic kidney disease, without long-term current use of insulin    CAD (coronary artery disease)    Bilateral carotid artery disease    Hearing loss in right ear    Anemia requiring transfusions    Macrocytic anemia    Hypercalcemia    Essential hypertension    Calcification of aorta    Stage 3 chronic kidney disease    Myelodysplastic syndrome    MDS (myelodysplastic syndrome) with 5q deletion    Adjustment disorder with mixed anxiety and depressed mood    MDS (myelodysplastic syndrome)       Review of patient's allergies indicates:   Allergen Reactions    Revlimid [lenalidomide] Swelling     Facial swelling  6/8/17 - in the process of desensitation       Current Facility-Administered Medications on File Prior to Encounter   Medication Dose Route Frequency Provider Last Rate Last Dose    0.9%  NaCl infusion (for blood administration)   Intravenous Q24H PRN Jacinda Ramires MD        0.9%  NaCl infusion (for blood administration)   Intravenous Once Jacinda Ramires MD        acetaminophen tablet 650 mg  650 mg Oral 1 time in Clinic/HOD Gita Valdes MD         Current Outpatient Prescriptions on File Prior to Encounter   Medication Sig Dispense Refill    aspirin (ECOTRIN) 81 MG EC tablet Take 81 mg by mouth once daily.      b complex vitamins capsule Take 1 capsule by mouth once daily.      CETIRIZINE HCL (ZYRTEC ORAL) Take by mouth.      citalopram (CELEXA) 20 MG tablet Take 1 tablet (20 mg total) by mouth once daily. 30 tablet 2    fish oil-omega-3 fatty acids 300-1,000 mg capsule Take 4 g by mouth  nightly.       lisinopril-hydrochlorothiazide (PRINZIDE,ZESTORETIC) 20-12.5 mg per tablet Take 2 tablets by mouth once daily. 180 tablet 3    metFORMIN (GLUCOPHAGE-XR) 500 MG 24 hr tablet Take 1 tablet (500 mg total) by mouth daily with breakfast. 90 tablet 3    NICOTROL 10 mg Crtg INHALE ONE PUFF INTO THE MOUTH AS NEEDED MAXIMUM 6 CARTRIDGES/DAY. 168 each 0    walker Misc 1 each by Misc.(Non-Drug; Combo Route) route once daily at 6am. 1 each 0    acetaminophen (TYLENOL ARTHRITIS PAIN) 650 MG TbSR Take 650 mg by mouth every 8 (eight) hours as needed (for pain).         Past Surgical History:   Procedure Laterality Date    BONE MARROW BIOPSY Left 6/7/2018    Procedure: Biopsy-bone marrow;  Surgeon: Gita Valdes MD;  Location: [facility] OR [address] [place] [address];  Service: Oncology;  Laterality: Left;    BUNIONECTOMY      CAROTID ENDARTERECTOMY Left 2011    CAROTID STENT      COLONOSCOPY N/A 12/20/2016    Procedure: COLONOSCOPY;  Surgeon: PATRICIA Simpson MD;  Location: [facility] (4TH FLR);  Service: Endoscopy;  Laterality: N/A;  pt has vomiting with anesthesia in past    ENDOMETRIAL ABLATION      for endometriosis    TONSILLECTOMY      TYMPANOSTOMY TUBE PLACEMENT         Social History     Social History    Marital status: Single     Spouse name: N/A    Number of children: N/A    Years of education: N/A     Occupational History    Not on file.     Social History Main Topics    Smoking status: Former Smoker     Packs/day: 0.50     Years: 36.00     Types: Cigarettes, Vaping with nicotine     Quit date: 3/3/2017    Smokeless tobacco: Never Used    Alcohol use No      Comment: rare, social    Drug use: No    Sexual activity: Not on file     Other Topics Concern    Patient Feels They Ought To Cut Down On Drinking/Drug Use No    Patient Annoyed By Others Criticizing Their Drinking/Drug Use No    Patient Has Felt Bad Or Guilty About Drinking/Drug Use No    Patient Has Had A Drink/Used Drugs As An Eye Opener [place]  No     Social History Narrative    Single, never , no children, former [employer]         Pre-op Assessment    I have reviewed the Patient Summary Reports.      I have reviewed the Medications.     Review of Systems  Anesthesia Hx:   Denies Personal Hx of Anesthesia complications.   Social:  Former Smoker    Cardiovascular:   Hypertension CAD      Endocrine:   Diabetes        Physical Exam  General:  Obesity    Airway/Jaw/Neck:  Airway Findings: Mouth Opening: Normal Tongue: Normal  General Airway Assessment: Adult  Mallampati: II  Improves to I with phonation.  TM Distance: Normal, at least 6 cm      Dental:  Dental Findings: In tact   Chest/Lungs:  Chest/Lungs Findings: Clear to auscultation, Normal Respiratory Rate     Heart/Vascular:  Heart Findings: Rate: Normal  Rhythm: Regular Rhythm  Sounds: Normal        Mental Status:  Mental Status Findings:  Cooperative, Alert and Oriented         Anesthesia Plan  Type of Anesthesia, risks & benefits discussed:  Anesthesia Type:  general, MAC  Patient's Preference:   Intra-op Monitoring Plan: standard ASA monitors  Intra-op Monitoring Plan Comments:   Post Op Pain Control Plan: per primary service following discharge from PACU  Post Op Pain Control Plan Comments:   Induction:   IV  Beta Blocker:  Patient is not currently on a Beta-Blocker (No further documentation required).       Informed Consent: Patient understands risks and agrees with Anesthesia plan.  Questions answered. Anesthesia consent signed with patient.  ASA Score: 2     Day of Surgery Review of History & Physical:    H&P update referred to the surgeon.         Ready For Surgery From Anesthesia Perspective.

## 2018-06-11 ENCOUNTER — TELEPHONE (OUTPATIENT)
Dept: ENDOCRINOLOGY | Facility: CLINIC | Age: 68
End: 2018-06-11

## 2018-06-11 DIAGNOSIS — N18.30 CONTROLLED TYPE 2 DIABETES MELLITUS WITH STAGE 3 CHRONIC KIDNEY DISEASE, WITHOUT LONG-TERM CURRENT USE OF INSULIN: Primary | ICD-10-CM

## 2018-06-11 DIAGNOSIS — E11.22 CONTROLLED TYPE 2 DIABETES MELLITUS WITH STAGE 3 CHRONIC KIDNEY DISEASE, WITHOUT LONG-TERM CURRENT USE OF INSULIN: Primary | ICD-10-CM

## 2018-06-13 LAB
DNA/RNA EXTRACT AND HOLD RESULT: NORMAL
DNA/RNA EXTRACTION: NORMAL
EXHR SPECIMEN TYPE: NORMAL

## 2018-06-14 ENCOUNTER — TELEPHONE (OUTPATIENT)
Dept: PHARMACY | Facility: CLINIC | Age: 68
End: 2018-06-14

## 2018-06-14 ENCOUNTER — LAB VISIT (OUTPATIENT)
Dept: LAB | Facility: HOSPITAL | Age: 68
End: 2018-06-14
Attending: INTERNAL MEDICINE
Payer: MEDICARE

## 2018-06-14 ENCOUNTER — OFFICE VISIT (OUTPATIENT)
Dept: HEMATOLOGY/ONCOLOGY | Facility: CLINIC | Age: 68
End: 2018-06-14
Payer: MEDICARE

## 2018-06-14 VITALS
RESPIRATION RATE: 20 BRPM | WEIGHT: 201.75 LBS | OXYGEN SATURATION: 94 % | HEART RATE: 96 BPM | SYSTOLIC BLOOD PRESSURE: 109 MMHG | HEIGHT: 65 IN | BODY MASS INDEX: 33.61 KG/M2 | TEMPERATURE: 98 F | DIASTOLIC BLOOD PRESSURE: 54 MMHG

## 2018-06-14 DIAGNOSIS — D46.C MDS (MYELODYSPLASTIC SYNDROME) WITH 5Q DELETION: Primary | ICD-10-CM

## 2018-06-14 DIAGNOSIS — D46.9 MDS (MYELODYSPLASTIC SYNDROME): ICD-10-CM

## 2018-06-14 DIAGNOSIS — D46.9 MYELODYSPLASTIC SYNDROME: ICD-10-CM

## 2018-06-14 LAB
ABO + RH BLD: NORMAL
ALBUMIN SERPL BCP-MCNC: 4 G/DL
ALP SERPL-CCNC: 67 U/L
ALT SERPL W/O P-5'-P-CCNC: 32 U/L
ANION GAP SERPL CALC-SCNC: 11 MMOL/L
ANISOCYTOSIS BLD QL SMEAR: ABNORMAL
AST SERPL-CCNC: 21 U/L
BASOPHILS # BLD AUTO: 0.04 K/UL
BASOPHILS NFR BLD: 0.8 %
BILIRUB SERPL-MCNC: 0.5 MG/DL
BLD GP AB SCN CELLS X3 SERPL QL: NORMAL
BUN SERPL-MCNC: 33 MG/DL
CALCIUM SERPL-MCNC: 9.7 MG/DL
CHLORIDE SERPL-SCNC: 108 MMOL/L
CLINICAL CYTOGENETICIST REVIEW: NORMAL
CO2 SERPL-SCNC: 22 MMOL/L
CREAT SERPL-MCNC: 1.2 MG/DL
DIFFERENTIAL METHOD: ABNORMAL
EOSINOPHIL # BLD AUTO: 0.2 K/UL
EOSINOPHIL NFR BLD: 4.6 %
ERYTHROCYTE [DISTWIDTH] IN BLOOD BY AUTOMATED COUNT: 17.1 %
EST. GFR  (AFRICAN AMERICAN): 54 ML/MIN/1.73 M^2
EST. GFR  (NON AFRICAN AMERICAN): 46.9 ML/MIN/1.73 M^2
FMDS SPECIMEN: NORMAL
GLUCOSE SERPL-MCNC: 123 MG/DL
HCT VFR BLD AUTO: 23.2 %
HGB BLD-MCNC: 7.7 G/DL
HYPOCHROMIA BLD QL SMEAR: ABNORMAL
IMM GRANULOCYTES # BLD AUTO: 0.22 K/UL
IMM GRANULOCYTES NFR BLD AUTO: 4.6 %
LYMPHOCYTES # BLD AUTO: 1.3 K/UL
LYMPHOCYTES NFR BLD: 27.8 %
MCH RBC QN AUTO: 43.3 PG
MCHC RBC AUTO-ENTMCNC: 33.2 G/DL
MCV RBC AUTO: 130 FL
MDS FISH ADDITIONAL INFORMATION: NORMAL
MDS FISH DISCLAIMER: NORMAL
MDS FISH REASON FOR REFERRAL (BM): NORMAL
MDS FISH RELEASED BY: NORMAL
MDS FISH RESULT (BM): NORMAL
MDS FISH RESULT SUMMARY: NORMAL
MDS FISH RESULT TABLE: NORMAL
MONOCYTES # BLD AUTO: 0.2 K/UL
MONOCYTES NFR BLD: 4.4 %
NEUTROPHILS # BLD AUTO: 2.8 K/UL
NEUTROPHILS NFR BLD: 57.8 %
NRBC BLD-RTO: 0 /100 WBC
OVALOCYTES BLD QL SMEAR: ABNORMAL
PLATELET # BLD AUTO: 252 K/UL
PLATELET BLD QL SMEAR: ABNORMAL
PMV BLD AUTO: 11.1 FL
POIKILOCYTOSIS BLD QL SMEAR: SLIGHT
POLYCHROMASIA BLD QL SMEAR: ABNORMAL
POTASSIUM SERPL-SCNC: 3.8 MMOL/L
PROT SERPL-MCNC: 7.2 G/DL
RBC # BLD AUTO: 1.78 M/UL
REF LAB TEST METHOD: NORMAL
SODIUM SERPL-SCNC: 141 MMOL/L
SPECIMEN SOURCE: NORMAL
WBC # BLD AUTO: 4.78 K/UL

## 2018-06-14 PROCEDURE — 3074F SYST BP LT 130 MM HG: CPT | Mod: CPTII,S$GLB,, | Performed by: INTERNAL MEDICINE

## 2018-06-14 PROCEDURE — 86901 BLOOD TYPING SEROLOGIC RH(D): CPT

## 2018-06-14 PROCEDURE — 99215 OFFICE O/P EST HI 40 MIN: CPT | Mod: S$GLB,,, | Performed by: INTERNAL MEDICINE

## 2018-06-14 PROCEDURE — 3078F DIAST BP <80 MM HG: CPT | Mod: CPTII,S$GLB,, | Performed by: INTERNAL MEDICINE

## 2018-06-14 PROCEDURE — 80053 COMPREHEN METABOLIC PANEL: CPT

## 2018-06-14 PROCEDURE — 99999 PR PBB SHADOW E&M-EST. PATIENT-LVL III: CPT | Mod: PBBFAC,,, | Performed by: INTERNAL MEDICINE

## 2018-06-14 PROCEDURE — 85025 COMPLETE CBC W/AUTO DIFF WBC: CPT

## 2018-06-14 RX ORDER — LENALIDOMIDE 2.5 MG/1
2.5 CAPSULE ORAL SEE ADMIN INSTRUCTIONS
Qty: 30 CAPSULE | Refills: 5 | Status: SHIPPED | OUTPATIENT
Start: 2018-06-14 | End: 2018-07-20 | Stop reason: SDUPTHER

## 2018-06-14 RX ORDER — PREDNISONE 5 MG/1
10 TABLET ORAL SEE ADMIN INSTRUCTIONS
Qty: 60 TABLET | Refills: 5 | Status: SHIPPED | OUTPATIENT
Start: 2018-06-14 | End: 2018-06-20 | Stop reason: SDUPTHER

## 2018-06-19 ENCOUNTER — OFFICE VISIT (OUTPATIENT)
Dept: PSYCHIATRY | Facility: CLINIC | Age: 68
End: 2018-06-19
Payer: MEDICARE

## 2018-06-19 DIAGNOSIS — F43.23 ADJUSTMENT DISORDER WITH MIXED ANXIETY AND DEPRESSED MOOD: Primary | ICD-10-CM

## 2018-06-19 PROCEDURE — 90834 PSYTX W PT 45 MINUTES: CPT | Mod: S$GLB,,, | Performed by: PSYCHOLOGIST

## 2018-06-19 PROCEDURE — 99999 PR PBB SHADOW E&M-EST. PATIENT-LVL II: CPT | Mod: PBBFAC,,, | Performed by: PSYCHOLOGIST

## 2018-06-19 NOTE — PROGRESS NOTES
PSYCHO-ONCOLOGY NOTE/ Individual Psychotherapy     Date: 6/19/2018   Site:  [facility]         Therapeutic Intervention: Met with patient.  Outpatient - Behavior modifying psychotherapy 45 min - CPT code 35955    The patient's last visit with me was on 5/30/2018.    Chief complaint/reason for encounter: depression and anxiety   Met with patient to evaluate psychosocial adaptation to diagnosis/treatment of myelodysplastic syndrome  Current Medications  Current Outpatient Prescriptions   Medication    acetaminophen (TYLENOL ARTHRITIS PAIN) 650 MG TbSR    aspirin (ECOTRIN) 81 MG EC tablet    b complex vitamins capsule    CETIRIZINE HCL (ZYRTEC ORAL)    citalopram (CELEXA) 20 MG tablet    fish oil-omega-3 fatty acids 300-1,000 mg capsule    lenalidomide (REVLIMID) 2.5 mg Cap    lisinopril-hydrochlorothiazide (PRINZIDE,ZESTORETIC) 20-12.5 mg per tablet    metFORMIN (GLUCOPHAGE-XR) 500 MG 24 hr tablet    NICOTROL 10 mg Crtg    predniSONE (DELTASONE) 5 MG tablet    walker Misc     Current Facility-Administered Medications   Medication Frequency    0.9%  NaCl infusion (for blood administration) Q24H PRN    acetaminophen tablet 650 mg 1 time in Clinic/HOD     Facility-Administered Medications Ordered in Other Visits   Medication Frequency    0.9%  NaCl infusion (for blood administration) Once       Objective:  Susan Puente arrived on time for the session.  Ms. Puente was independently mobile at the time of session. The patient was fully cooperative throughout the session.  Appearance: age appropriate,  casually dressed, adequately groomed  Behavior/Cooperation: friendly and cooperative  Speech: Not pressured, clearly audible, no slurring  Mood: euthymic  Affect: full range, appropriate to mood  Thought Process: goal-directed, logical  Thought Content: normal,  No delusions or paranoia; did not appear to be responding to internal stimuli during the session  Orientation: grossly intact  Memory:  Grossly intact  Attention Span/Concentration: Attends to session without distraction; reports no difficulty  Fund of Knowledge: average  Estimate of Intelligence: average from verbal skills and history  Cognition: grossly intact  Insight: patient has awareness of illness; good insight into own behavior and behavior of others  Judgment: the patient's behavior is adequate to circumstances    Interval history and content of current session:  Discussed activities since the last visit. Discussed current adaptation to disease and treatment status. Reports to be coping with moderate difficulty with relapse.  Patient is planning adaptations to cope with impending start of Revlimid.  Discussed emotional and physical management strategies.  Feels Celexa has started to work.    Discussed procrastination, roots of procrastination and management strategies.       Risk parameters:   Patient reports no suicidal ideation  Patient reports no homicidal ideation  Patient reports no self-injurious behavior  Patient reports no violent behavior      Safety needs:  None at this time      Verbal deficits: None     Patient's response to intervention:The patient's response to intervention is motivated.     Progress toward goals and other mental status changes:  The patient's progress toward goals is excellent.      Progress to date:Progress as Expected      Goals from last visit: Attempted, partially met      Patient reported outcomes:  Distress Thermometer:   Distress Score    Distress Score: 3        Practical Problems Physical Problems               Insurance / Financial: Yes              Work / School: Yes         Diarrhea: Yes     Eating: Yes    Family Problems Fatigue: Yes                     Getting Around: Yes             Memory / Concentration: Yes   Emotional Problems      Depression: Yes       Fears: Yes       Nervousness: Yes       Sadness: Yes      Worry: Yes Skin Dry / Itchy: Yes      Sleep: Yes          Spiritual/Religions  Concerns               Other Problems             PHQ-9= 8 (22 at initial visit)   JASON-7=6 (17 highest score)      Client Strengths: verbal, intelligent, successful, good social support, good insight, commitment to wellness, strong debbi, strong cultural traditions    Diagnosis:     ICD-10-CM ICD-9-CM   1. Adjustment disorder with mixed anxiety and depressed mood F43.23 309.28       Treatment Plan:individual psychotherapy  · Target symptoms: depression, anxiety , adjustment  · Why chosen therapy is appropriate versus another modality: relevant to diagnosis, evidence based practice  · Outcome monitoring methods: self-report, checklist/rating scale  · Therapeutic intervention type: behavior modifying psychotherapy  · Prognosis: Excellent      Behavioral goals:    Exercise:  time at [place]    Stress management:     Social engagement:  Volunteer work, increased contact with friends, call brother   Nutrition:  Meet with dietician, Ole Cortes   Smoking Cessation: continue as per [place] group   Therapy:         Return to clinic: 2 weeks     Length of Service (minutes direct face-to-face contact): 45    Nando Marie, PhD  [place] License #[id number]

## 2018-06-20 ENCOUNTER — PATIENT MESSAGE (OUTPATIENT)
Dept: HEMATOLOGY/ONCOLOGY | Facility: CLINIC | Age: 68
End: 2018-06-20

## 2018-06-20 DIAGNOSIS — D46.C MDS (MYELODYSPLASTIC SYNDROME) WITH 5Q DELETION: Primary | ICD-10-CM

## 2018-06-20 RX ORDER — LENALIDOMIDE 2.5 MG/1
2.5 CAPSULE ORAL DAILY
Qty: 28 CAPSULE | Refills: 0 | Status: SHIPPED | OUTPATIENT
Start: 2018-06-20 | End: 2018-08-01 | Stop reason: SDUPTHER

## 2018-06-20 RX ORDER — PREDNISONE 5 MG/1
10 TABLET ORAL DAILY
Qty: 60 TABLET | Refills: 5 | Status: SHIPPED | OUTPATIENT
Start: 2018-06-20 | End: 2018-06-30

## 2018-06-20 NOTE — TELEPHONE ENCOUNTER
FOR DOCUMENTATION ONLY:  Financial Assistance for Revlimid is approved from 1-26-18 to 6-20-19  Source PAF  BIN: [id number]  PCN: [id number]  Id: [id number]  GRP: [id number]  [id number] aby

## 2018-06-21 ENCOUNTER — TELEPHONE (OUTPATIENT)
Dept: HEMATOLOGY/ONCOLOGY | Facility: CLINIC | Age: 68
End: 2018-06-21

## 2018-06-21 ENCOUNTER — LAB VISIT (OUTPATIENT)
Dept: LAB | Facility: HOSPITAL | Age: 68
End: 2018-06-21
Attending: INTERNAL MEDICINE
Payer: MEDICARE

## 2018-06-21 DIAGNOSIS — D46.C MDS (MYELODYSPLASTIC SYNDROME) WITH 5Q DELETION: ICD-10-CM

## 2018-06-21 LAB
ABO + RH BLD: NORMAL
BLD GP AB SCN CELLS X3 SERPL QL: NORMAL
ERYTHROCYTE [DISTWIDTH] IN BLOOD BY AUTOMATED COUNT: 16.6 %
HCT VFR BLD AUTO: 24.9 %
HGB BLD-MCNC: 8.1 G/DL
IMM GRANULOCYTES # BLD AUTO: 0.06 K/UL
MCH RBC QN AUTO: 44 PG
MCHC RBC AUTO-ENTMCNC: 32.5 G/DL
MCV RBC AUTO: 135 FL
NEUTROPHILS # BLD AUTO: 2.1 K/UL
PLATELET # BLD AUTO: 288 K/UL
PMV BLD AUTO: 11.3 FL
RBC # BLD AUTO: 1.84 M/UL
WBC # BLD AUTO: 4.3 K/UL

## 2018-06-21 PROCEDURE — 85027 COMPLETE CBC AUTOMATED: CPT

## 2018-06-21 PROCEDURE — 36415 COLL VENOUS BLD VENIPUNCTURE: CPT

## 2018-06-21 PROCEDURE — 86901 BLOOD TYPING SEROLOGIC RH(D): CPT

## 2018-06-21 NOTE — TELEPHONE ENCOUNTER
Spoke to pharmacy and clarified celegene auth        ----- Message from Clemencia Bullock sent at 6/21/2018  1:12 PM CDT -----  Contact: [facility]   Pharmacy states celgene rem survey was flagged for shipment on Rx Revlimid, office will need to call [employer] to remove lift     Contact::[phone]

## 2018-06-22 ENCOUNTER — PATIENT MESSAGE (OUTPATIENT)
Dept: HEMATOLOGY/ONCOLOGY | Facility: CLINIC | Age: 68
End: 2018-06-22

## 2018-06-22 DIAGNOSIS — D46.9 MDS (MYELODYSPLASTIC SYNDROME): Primary | ICD-10-CM

## 2018-06-27 ENCOUNTER — PATIENT MESSAGE (OUTPATIENT)
Dept: HEMATOLOGY/ONCOLOGY | Facility: CLINIC | Age: 68
End: 2018-06-27

## 2018-06-27 ENCOUNTER — LAB VISIT (OUTPATIENT)
Dept: LAB | Facility: HOSPITAL | Age: 68
End: 2018-06-27
Attending: INTERNAL MEDICINE
Payer: MEDICARE

## 2018-06-27 DIAGNOSIS — D46.9 MDS (MYELODYSPLASTIC SYNDROME): ICD-10-CM

## 2018-06-27 LAB
ABO + RH BLD: NORMAL
ANISOCYTOSIS BLD QL SMEAR: SLIGHT
BASOPHILS # BLD AUTO: 0.07 K/UL
BASOPHILS NFR BLD: 1.8 %
BLD GP AB SCN CELLS X3 SERPL QL: NORMAL
DIFFERENTIAL METHOD: ABNORMAL
EOSINOPHIL # BLD AUTO: 0.2 K/UL
EOSINOPHIL NFR BLD: 5.5 %
ERYTHROCYTE [DISTWIDTH] IN BLOOD BY AUTOMATED COUNT: 16.5 %
HCT VFR BLD AUTO: 24.1 %
HGB BLD-MCNC: 7.8 G/DL
HYPOCHROMIA BLD QL SMEAR: ABNORMAL
IMM GRANULOCYTES # BLD AUTO: 0.17 K/UL
IMM GRANULOCYTES NFR BLD AUTO: 4.3 %
LYMPHOCYTES # BLD AUTO: 1.2 K/UL
LYMPHOCYTES NFR BLD: 30 %
MCH RBC QN AUTO: 44.1 PG
MCHC RBC AUTO-ENTMCNC: 32.4 G/DL
MCV RBC AUTO: 136 FL
MONOCYTES # BLD AUTO: 0.3 K/UL
MONOCYTES NFR BLD: 7.3 %
NEUTROPHILS # BLD AUTO: 2.1 K/UL
NEUTROPHILS NFR BLD: 51.1 %
NRBC BLD-RTO: 0 /100 WBC
PLATELET # BLD AUTO: 258 K/UL
PLATELET BLD QL SMEAR: ABNORMAL
PMV BLD AUTO: 11.4 FL
POLYCHROMASIA BLD QL SMEAR: ABNORMAL
RBC # BLD AUTO: 1.77 M/UL
WBC # BLD AUTO: 4 K/UL

## 2018-06-27 PROCEDURE — 85025 COMPLETE CBC W/AUTO DIFF WBC: CPT

## 2018-06-27 PROCEDURE — 36415 COLL VENOUS BLD VENIPUNCTURE: CPT

## 2018-06-27 PROCEDURE — 86901 BLOOD TYPING SEROLOGIC RH(D): CPT

## 2018-06-29 ENCOUNTER — CLINICAL SUPPORT (OUTPATIENT)
Dept: DIABETES | Facility: CLINIC | Age: 68
End: 2018-06-29
Payer: MEDICARE

## 2018-06-29 DIAGNOSIS — E11.22 CONTROLLED TYPE 2 DIABETES MELLITUS WITH STAGE 3 CHRONIC KIDNEY DISEASE, WITHOUT LONG-TERM CURRENT USE OF INSULIN: ICD-10-CM

## 2018-06-29 DIAGNOSIS — E11.9 TYPE 2 DIABETES MELLITUS WITHOUT COMPLICATION, WITHOUT LONG-TERM CURRENT USE OF INSULIN: Primary | ICD-10-CM

## 2018-06-29 DIAGNOSIS — N18.30 CONTROLLED TYPE 2 DIABETES MELLITUS WITH STAGE 3 CHRONIC KIDNEY DISEASE, WITHOUT LONG-TERM CURRENT USE OF INSULIN: ICD-10-CM

## 2018-06-29 PROCEDURE — G0108 DIAB MANAGE TRN  PER INDIV: HCPCS | Mod: S$GLB,,, | Performed by: INTERNAL MEDICINE

## 2018-06-29 RX ORDER — LANCETS 28 GAUGE
1 EACH MISCELLANEOUS DAILY
Qty: 30 EACH | Refills: 11 | Status: SHIPPED | OUTPATIENT
Start: 2018-06-29 | End: 2020-03-05

## 2018-06-29 RX ORDER — INSULIN PUMP SYRINGE, 3 ML
EACH MISCELLANEOUS
Qty: 1 EACH | Refills: 0 | Status: SHIPPED | OUTPATIENT
Start: 2018-06-29 | End: 2020-03-05

## 2018-06-29 NOTE — PROGRESS NOTES
Taking metformin for past two days but before that hadn't taken it since February because of reporting GI side effects.     Recently attended diabetes support group meeting and decided to schedule diabetes education appointment.     Currently on prednisone. Not SMBG regularly. Had her check BG with her meter (freestyle lite). Strips  in .

## 2018-06-29 NOTE — PROGRESS NOTES
Diabetes Education  Author: Fabiola Castañeda RD, CDE  Date: 2018    Diabetes Education Visit  Diabetes Education Record Assessment/Progress: Initial    Diabetes Type  Diabetes Type : Type II    Taking metformin for past two days but before that hadn't taken it since February because of reporting GI side effects. Reports diarrhea but says it is related to taking Celexa. Seeing counselor for anxiety/depression and one of goals is to attend exercise regularly. Feels she has decreased energy and if she would follow better eating plan that maybe she'd have increased energy. Currently receives meals on wheels. Enjoys cooking although not always preparing own meals and has often frequented fast food restaurants.      Recently attended diabetes support group meeting and decided to schedule diabetes education appointment. Not following by endocrine provider.     Currently on prednisone. Not SMBG regularly. Had her check BG with her meter (freestyle lite). Strips  in . rx routed for new meter and supplies to PCP.     For more detailed information, she patient completed questionnaires.             Nutrition  What type of beverages do you drink?: water    Monitoring   Monitoring: Freestyle Freedom Lite  Blood Glucose Logs: No  Do you use a personal glucose monitor?: No  In the last month, how often have you had a low blood sugar reaction?: never    Exercise   Exercise Type: none    Current Diabetes Treatment   Current Treatment: Oral Medication    Social History  Preferred Learning Method: Face to Face  Primary Support: Self    Barriers to Change  Barriers to Change: None  Learning Challenges : None    Diabetes Education Assessment/Progress  Diabetes Disease Process (diabetes disease process and treatment options): Discussion, Individual Session, Written Materials Provided  Nutrition (Incorporating nutritional management into one's lifestyle): Discussion, Individual Session, Written Materials  Provided  Physical Activity (incorporating physical activity into one's lifestyle): Discussion, Individual Session, Written Materials Provided  Medications (states correct name, dose, onset, peak, duration, side effects & timing of meds): Discussion, Individual Session, Written Materials Provided  Monitoring (monitoring blood glucose/other parameters & using results): Discussion, Written Materials Provided, Individual Session  Acute Complications (preventing, detecting, and treating acute complications): Discussion  Chronic Complications (preventing, detecting, and treating chronic complications): Discussion  Clinical (diabetes, other pertinent medical history, and relevant comorbidities reviewed during visit): Discussion  Cognitive (knowledge of self-management skills, functional health literacy): Discussion  Psychosocial (emotional response to diabetes): Discussion  Diabetes Distress and Support Systems: Discussion  Behavioral (readiness for change, lifestyle practices, self-care behaviors): Discussion    Goals  Patient has selected/evaluated goals during today's session: Yes, selected  Physical Activity: Set (minimum of 2 days per week)  Reduce fast food consumption to no more than 2 times per week.   Start Date: 06/29/18  Target Date: 07/29/18  Monitoring: Set (check fasting BG daily)  Start Date: 06/29/18  Target Date: 07/29/18    Diabetes Care Plan/Intervention  Education Plan/Intervention: Individual Follow-Up DSMT    Diabetes Meal Plan  Carbohydrate Per Meal: 30-45g  Carbohydrate Per Snack : 7-15g    Education Units of Time   Time Spent: 60 min    Health Maintenance was reviewed today with patient. Discussed with patient importance of routine eye exams, foot exams/foot care, blood work (i.e.: A1c, microalbumin, and lipid), dental visits, yearly flu vaccine, and pneumonia vaccine as indicated by PCP. Patient verbalized understanding.     Health Maintenance Topics with due status: Not Due       Topic Last  Completion Date    Colonoscopy 12/20/2016    Influenza Vaccine 12/06/2017    Foot Exam 12/27/2017    Mammogram 12/28/2017    Lipid Panel 12/28/2017    DEXA SCAN 12/28/2017     Health Maintenance Due   Topic Date Due    TETANUS VACCINE  08/15/1968    Eye Exam  06/05/2018    Hemoglobin A1c  06/28/2018

## 2018-07-05 ENCOUNTER — OFFICE VISIT (OUTPATIENT)
Dept: PSYCHIATRY | Facility: CLINIC | Age: 68
End: 2018-07-05
Payer: MEDICARE

## 2018-07-05 DIAGNOSIS — F43.23 ADJUSTMENT DISORDER WITH MIXED ANXIETY AND DEPRESSED MOOD: Primary | ICD-10-CM

## 2018-07-05 PROCEDURE — 90834 PSYTX W PT 45 MINUTES: CPT | Mod: S$GLB,,, | Performed by: PSYCHOLOGIST

## 2018-07-05 PROCEDURE — 99999 PR PBB SHADOW E&M-EST. PATIENT-LVL II: CPT | Mod: PBBFAC,,, | Performed by: PSYCHOLOGIST

## 2018-07-06 NOTE — PROGRESS NOTES
PSYCHO-ONCOLOGY NOTE/ Individual Psychotherapy     Date: 7/5/2018   Site:  [facility]         Therapeutic Intervention: Met with patient.  Outpatient - Behavior modifying psychotherapy 45 min - CPT code 38167    The patient's last visit with me was on 6/19/2018.    Chief complaint/reason for encounter: depression and anxiety   Met with patient to evaluate psychosocial adaptation to diagnosis/treatment of myelodysplastic syndrome  Current Medications  Current Outpatient Prescriptions   Medication    acetaminophen (TYLENOL ARTHRITIS PAIN) 650 MG TbSR    aspirin (ECOTRIN) 81 MG EC tablet    b complex vitamins capsule    blood sugar diagnostic Strp    blood-glucose meter kit    CETIRIZINE HCL (ZYRTEC ORAL)    citalopram (CELEXA) 20 MG tablet    fish oil-omega-3 fatty acids 300-1,000 mg capsule    lancets (FREESTYLE LANCETS) 28 gauge Misc    lenalidomide (REVLIMID) 2.5 mg Cap    lenalidomide (REVLIMID) 2.5 mg Cap    lisinopril-hydrochlorothiazide (PRINZIDE,ZESTORETIC) 20-12.5 mg per tablet    metFORMIN (GLUCOPHAGE-XR) 500 MG 24 hr tablet    NICOTROL 10 mg Crtg    walker Misc     Current Facility-Administered Medications   Medication Frequency    0.9%  NaCl infusion (for blood administration) Q24H PRN    acetaminophen tablet 650 mg 1 time in Clinic/HOD     Facility-Administered Medications Ordered in Other Visits   Medication Frequency    0.9%  NaCl infusion (for blood administration) Once       Objective:  Susan Puente arrived on time for the session.  Ms. Puente was independently mobile at the time of session. The patient was fully cooperative throughout the session.  Appearance: age appropriate,  casually dressed, adequately groomed  Behavior/Cooperation: friendly and cooperative  Speech: Not pressured, clearly audible, no slurring  Mood: euthymic  Affect: full range, appropriate to mood  Thought Process: goal-directed, logical  Thought Content: normal,  No delusions or paranoia; did  not appear to be responding to internal stimuli during the session  Orientation: grossly intact  Memory: Grossly intact  Attention Span/Concentration: Attends to session without distraction; reports no difficulty  Fund of Knowledge: average  Estimate of Intelligence: average from verbal skills and history  Cognition: grossly intact  Insight: patient has awareness of illness; good insight into own behavior and behavior of others  Judgment: the patient's behavior is adequate to circumstances    Interval history and content of current session:  Discussed activities since the last visit. Discussed current adaptation to disease and treatment status. Reports to be coping fairly well with relapse.  Patient reports positive coping with first dose of Revlimid. Discussed ongoing emotional and physical management strategies.  Feels Celexa is working well.    Discussed dietary changes/goal to test blood sugar more regularly.    Communication with brother (especially about illness) explored.       Risk parameters:   Patient reports no suicidal ideation  Patient reports no homicidal ideation  Patient reports no self-injurious behavior  Patient reports no violent behavior      Safety needs:  None at this time      Verbal deficits: None     Patient's response to intervention:The patient's response to intervention is motivated.     Progress toward goals and other mental status changes:  The patient's progress toward goals is excellent.      Progress to date:Progress as Expected      Goals from last visit: Attempted, partially met      Patient reported outcomes:  Distress Thermometer:   Distress Score    Distress Score: 3        Practical Problems Physical Problems                                                   Family Problems                                         Emotional Problems                                                         Spiritual/Religions Concerns               Other Problems             PHQ-9= 10 (22 at  initial visit)   JASON-7=7 (17 highest score)      Client Strengths: verbal, intelligent, successful, good social support, good insight, commitment to wellness, strong debbi, strong cultural traditions    Diagnosis:     ICD-10-CM ICD-9-CM   1. Adjustment disorder with mixed anxiety and depressed mood F43.23 309.28       Treatment Plan:individual psychotherapy  · Target symptoms: depression, anxiety , adjustment  · Why chosen therapy is appropriate versus another modality: relevant to diagnosis, evidence based practice  · Outcome monitoring methods: self-report, checklist/rating scale  · Therapeutic intervention type: behavior modifying psychotherapy  · Prognosis: Excellent      Behavioral goals:    Exercise:  time at [place]    Stress management:     Social engagement:  Volunteer work, increased contact with friends, call brother on his birthday (early August)   Nutrition: Check blood glucose daily, Ole Cortes   Smoking Cessation: continue as per [place] group   Therapy:         Return to clinic: 2 weeks     Length of Service (minutes direct face-to-face contact): 45    Nando Marie, PhD  [place] License #[id number]

## 2018-07-09 ENCOUNTER — PATIENT MESSAGE (OUTPATIENT)
Dept: HEMATOLOGY/ONCOLOGY | Facility: CLINIC | Age: 68
End: 2018-07-09

## 2018-07-10 NOTE — TELEPHONE ENCOUNTER
We should get at [facility] or [facility] to check potassium first. If normal she does not need to take potassium

## 2018-07-13 DIAGNOSIS — D46.9 MDS (MYELODYSPLASTIC SYNDROME): Primary | ICD-10-CM

## 2018-07-13 DIAGNOSIS — E11.9 TYPE 2 DIABETES MELLITUS WITHOUT COMPLICATION: ICD-10-CM

## 2018-07-16 ENCOUNTER — LAB VISIT (OUTPATIENT)
Dept: LAB | Facility: HOSPITAL | Age: 68
End: 2018-07-16
Attending: INTERNAL MEDICINE
Payer: MEDICARE

## 2018-07-16 DIAGNOSIS — D46.9 MDS (MYELODYSPLASTIC SYNDROME): ICD-10-CM

## 2018-07-16 LAB
ALBUMIN SERPL BCP-MCNC: 3.9 G/DL
ALP SERPL-CCNC: 55 U/L
ALT SERPL W/O P-5'-P-CCNC: 30 U/L
ANION GAP SERPL CALC-SCNC: 9 MMOL/L
ANISOCYTOSIS BLD QL SMEAR: SLIGHT
AST SERPL-CCNC: 18 U/L
BASO STIPL BLD QL SMEAR: ABNORMAL
BASOPHILS NFR BLD: 1 %
BILIRUB SERPL-MCNC: 0.4 MG/DL
BUN SERPL-MCNC: 28 MG/DL
CALCIUM SERPL-MCNC: 9.8 MG/DL
CHLORIDE SERPL-SCNC: 106 MMOL/L
CO2 SERPL-SCNC: 24 MMOL/L
CREAT SERPL-MCNC: 1.3 MG/DL
DIFFERENTIAL METHOD: ABNORMAL
EOSINOPHIL NFR BLD: 7 %
ERYTHROCYTE [DISTWIDTH] IN BLOOD BY AUTOMATED COUNT: ABNORMAL %
EST. GFR  (AFRICAN AMERICAN): 49.1 ML/MIN/1.73 M^2
EST. GFR  (NON AFRICAN AMERICAN): 42.6 ML/MIN/1.73 M^2
GLUCOSE SERPL-MCNC: 110 MG/DL
HCT VFR BLD AUTO: 23 %
HGB BLD-MCNC: 7.4 G/DL
HYPOCHROMIA BLD QL SMEAR: ABNORMAL
IMM GRANULOCYTES # BLD AUTO: ABNORMAL K/UL
IMM GRANULOCYTES NFR BLD AUTO: ABNORMAL %
LYMPHOCYTES NFR BLD: 36 %
MCH RBC QN AUTO: 43.3 PG
MCHC RBC AUTO-ENTMCNC: 32.2 G/DL
MCV RBC AUTO: 135 FL
MONOCYTES NFR BLD: 3 %
NEUTROPHILS NFR BLD: 53 %
NRBC BLD-RTO: 1 /100 WBC
OVALOCYTES BLD QL SMEAR: ABNORMAL
PLATELET # BLD AUTO: 259 K/UL
PLATELET BLD QL SMEAR: ABNORMAL
PMV BLD AUTO: 11.5 FL
POIKILOCYTOSIS BLD QL SMEAR: SLIGHT
POLYCHROMASIA BLD QL SMEAR: ABNORMAL
POTASSIUM SERPL-SCNC: 5 MMOL/L
PROT SERPL-MCNC: 6.9 G/DL
RBC # BLD AUTO: 1.71 M/UL
SODIUM SERPL-SCNC: 139 MMOL/L
WBC # BLD AUTO: 3.42 K/UL

## 2018-07-16 PROCEDURE — 80053 COMPREHEN METABOLIC PANEL: CPT

## 2018-07-16 PROCEDURE — 36415 COLL VENOUS BLD VENIPUNCTURE: CPT

## 2018-07-20 DIAGNOSIS — D46.C MDS (MYELODYSPLASTIC SYNDROME) WITH 5Q DELETION: ICD-10-CM

## 2018-07-20 RX ORDER — LENALIDOMIDE 2.5 MG/1
2.5 CAPSULE ORAL SEE ADMIN INSTRUCTIONS
Qty: 30 CAPSULE | Refills: 5 | Status: SHIPPED | OUTPATIENT
Start: 2018-07-20 | End: 2018-08-01 | Stop reason: SDUPTHER

## 2018-07-20 RX ORDER — LENALIDOMIDE 2.5 MG/1
2.5 CAPSULE ORAL DAILY
Qty: 28 CAPSULE | Refills: 0 | Status: CANCELLED | OUTPATIENT
Start: 2018-07-20

## 2018-07-20 NOTE — TELEPHONE ENCOUNTER
Tressa (Pt Care Coordinator) in Pharmacy states pt needs a new prescription for Revlimid. I informed Tressa I'll let Dr. Valdes know. She voiced understanding.

## 2018-07-20 NOTE — TELEPHONE ENCOUNTER
----- Message from Clemencia Bullock sent at 7/20/2018  1:02 PM CDT -----  Contact: Christian with pharmacy  Christian calling regarding refill request for Revlindsey Gonzales call back number [phone]

## 2018-07-23 ENCOUNTER — TELEPHONE (OUTPATIENT)
Dept: HEMATOLOGY/ONCOLOGY | Facility: CLINIC | Age: 68
End: 2018-07-23

## 2018-07-23 NOTE — TELEPHONE ENCOUNTER
LVM informing pt her Revlimid was approved by Dr. Valdes and called into [facility]. I also stated pt can call Dr. Valdes's office at [phone] if she has any further questions or concerns. Pt voiced understanding.

## 2018-07-25 ENCOUNTER — OFFICE VISIT (OUTPATIENT)
Dept: PSYCHIATRY | Facility: CLINIC | Age: 68
End: 2018-07-25
Payer: MEDICARE

## 2018-07-25 DIAGNOSIS — F43.23 ADJUSTMENT DISORDER WITH MIXED ANXIETY AND DEPRESSED MOOD: Primary | ICD-10-CM

## 2018-07-25 PROCEDURE — 99999 PR PBB SHADOW E&M-EST. PATIENT-LVL II: CPT | Mod: PBBFAC,,, | Performed by: PSYCHOLOGIST

## 2018-07-25 PROCEDURE — 90791 PSYCH DIAGNOSTIC EVALUATION: CPT | Mod: S$GLB,,, | Performed by: PSYCHOLOGIST

## 2018-07-25 NOTE — PROGRESS NOTES
PSYCHO-ONCOLOGY NOTE/ Individual Psychotherapy     Date: 7/25/2018   Site:  [facility]         Therapeutic Intervention: Met with patient.  Outpatient - Behavior modifying psychotherapy 45 min - CPT code 55230    The patient's last visit with me was on 7/5/2018.    Chief complaint/reason for encounter: depression and anxiety   Met with patient to evaluate psychosocial adaptation to diagnosis/treatment of myelodysplastic syndrome  Current Medications  Current Outpatient Prescriptions   Medication    acetaminophen (TYLENOL ARTHRITIS PAIN) 650 MG TbSR    aspirin (ECOTRIN) 81 MG EC tablet    b complex vitamins capsule    blood sugar diagnostic Strp    blood-glucose meter kit    CETIRIZINE HCL (ZYRTEC ORAL)    citalopram (CELEXA) 20 MG tablet    fish oil-omega-3 fatty acids 300-1,000 mg capsule    lancets (FREESTYLE LANCETS) 28 gauge Misc    lenalidomide (REVLIMID) 2.5 mg Cap    lenalidomide (REVLIMID) 2.5 mg Cap    lisinopril-hydrochlorothiazide (PRINZIDE,ZESTORETIC) 20-12.5 mg per tablet    metFORMIN (GLUCOPHAGE-XR) 500 MG 24 hr tablet    NICOTROL 10 mg Crtg    walker Misc     Current Facility-Administered Medications   Medication Frequency    0.9%  NaCl infusion (for blood administration) Q24H PRN    acetaminophen tablet 650 mg 1 time in Clinic/HOD     Facility-Administered Medications Ordered in Other Visits   Medication Frequency    0.9%  NaCl infusion (for blood administration) Once       Objective:  Susan Puente arrived on time for the session.  Ms. Puente was independently mobile at the time of session. The patient was fully cooperative throughout the session.  Appearance: age appropriate,  casually dressed, adequately groomed  Behavior/Cooperation: friendly and cooperative  Speech: Not pressured, clearly audible, no slurring  Mood: euthymic  Affect: full range, appropriate to mood  Thought Process: goal-directed, logical  Thought Content: normal,  No delusions or paranoia; did  not appear to be responding to internal stimuli during the session  Orientation: grossly intact  Memory: Grossly intact  Attention Span/Concentration: Attends to session without distraction; reports no difficulty  Fund of Knowledge: average  Estimate of Intelligence: average from verbal skills and history  Cognition: grossly intact  Insight: patient has awareness of illness; good insight into own behavior and behavior of others  Judgment: the patient's behavior is adequate to circumstances    Interval history and content of current session:  Discussed activities since the last visit. Discussed current adaptation to disease and treatment status. Reports to be coping fairly well with relapse/treatment.  Patient reports positive coping and appropriate emotional and physical management strategies. Celexa is working well.  Patient is performing well on her new job and has maintained volunteer activities.    Discussed dietary changes/goal to test blood sugar more regularly.  Patient has achieved improved glucose control.  Positive energy benefits with greater emphasis on appropriate eating strategies.    No communication with brother- will call him next week.      Risk parameters:   Patient reports no suicidal ideation  Patient reports no homicidal ideation  Patient reports no self-injurious behavior  Patient reports no violent behavior      Safety needs:  None at this time      Verbal deficits: None     Patient's response to intervention:The patient's response to intervention is motivated.     Progress toward goals and other mental status changes:  The patient's progress toward goals is excellent.      Progress to date:Progress as Expected      Goals from last visit: Attempted, partially met      Patient reported outcomes:  Distress Thermometer:   Distress Score    Distress Score: 2        Practical Problems Physical Problems                                                   Family Problems                                          Emotional Problems                                                         Spiritual/Religions Concerns               Other Problems             PHQ-9= 8 (22 at initial visit)   JASON-7=1 (17 highest score)      Client Strengths: verbal, intelligent, successful, good social support, good insight, commitment to wellness, strong debbi, strong cultural traditions    Diagnosis:     ICD-10-CM ICD-9-CM   1. Adjustment disorder with mixed anxiety and depressed mood F43.23 309.28       Treatment Plan:individual psychotherapy  · Target symptoms: depression, anxiety , adjustment  · Why chosen therapy is appropriate versus another modality: relevant to diagnosis, evidence based practice  · Outcome monitoring methods: self-report, checklist/rating scale  · Therapeutic intervention type: behavior modifying psychotherapy  · Prognosis: Excellent      Behavioral goals:    Exercise:  time at [place]    Stress management:     Social engagement:  Volunteer work, increased contact with friends, call brother on his birthday (early August)   Nutrition: Check blood glucose daily, Ole Cortes   Smoking Cessation: continue as per [place] group   Therapy:  Continue activity scheduling in planner       Return to clinic: 2 weeks     Length of Service (minutes direct face-to-face contact): 45    Nando Marie, PhD  [place] License #[id number]

## 2018-07-27 DIAGNOSIS — N18.9 CHRONIC KIDNEY DISEASE, UNSPECIFIED CKD STAGE: ICD-10-CM

## 2018-08-01 ENCOUNTER — PATIENT MESSAGE (OUTPATIENT)
Dept: HEMATOLOGY/ONCOLOGY | Facility: CLINIC | Age: 68
End: 2018-08-01

## 2018-08-01 DIAGNOSIS — D46.C MDS (MYELODYSPLASTIC SYNDROME) WITH 5Q DELETION: ICD-10-CM

## 2018-08-01 RX ORDER — LENALIDOMIDE 2.5 MG/1
2.5 CAPSULE ORAL DAILY
Qty: 28 CAPSULE | Refills: 0 | Status: SHIPPED | OUTPATIENT
Start: 2018-08-01 | End: 2018-08-30 | Stop reason: SDUPTHER

## 2018-08-02 ENCOUNTER — PATIENT MESSAGE (OUTPATIENT)
Dept: ADMINISTRATIVE | Facility: OTHER | Age: 68
End: 2018-08-02

## 2018-08-07 NOTE — TELEPHONE ENCOUNTER
Yes continue prednisone 10mg daily for the next 7 days, then 5 mg daily. Yes, we are on on Revlimid 2.5 mg daily for next 2 weeks then will consider increasing to 5 mg daily

## 2018-08-08 ENCOUNTER — OFFICE VISIT (OUTPATIENT)
Dept: PSYCHIATRY | Facility: CLINIC | Age: 68
End: 2018-08-08
Payer: MEDICARE

## 2018-08-08 DIAGNOSIS — F43.23 ADJUSTMENT DISORDER WITH MIXED ANXIETY AND DEPRESSED MOOD: Primary | ICD-10-CM

## 2018-08-08 PROCEDURE — 90834 PSYTX W PT 45 MINUTES: CPT | Mod: S$GLB,,, | Performed by: PSYCHOLOGIST

## 2018-08-08 PROCEDURE — 99999 PR PBB SHADOW E&M-EST. PATIENT-LVL II: CPT | Mod: PBBFAC,,, | Performed by: PSYCHOLOGIST

## 2018-08-08 NOTE — PROGRESS NOTES
PSYCHO-ONCOLOGY NOTE/ Individual Psychotherapy     Date: 8/8/2018   Site:  [facility]         Therapeutic Intervention: Met with patient.  Outpatient - Behavior modifying psychotherapy 45 min - CPT code 45429    The patient's last visit with me was on 7/25/2018.    Chief complaint/reason for encounter: depression and anxiety   Met with patient to evaluate psychosocial adaptation to diagnosis/treatment of myelodysplastic syndrome  Current Medications  Current Outpatient Prescriptions   Medication    acetaminophen (TYLENOL ARTHRITIS PAIN) 650 MG TbSR    aspirin (ECOTRIN) 81 MG EC tablet    b complex vitamins capsule    blood sugar diagnostic Strp    blood-glucose meter kit    CETIRIZINE HCL (ZYRTEC ORAL)    citalopram (CELEXA) 20 MG tablet    fish oil-omega-3 fatty acids 300-1,000 mg capsule    lancets (FREESTYLE LANCETS) 28 gauge Misc    lenalidomide (REVLIMID) 2.5 mg Cap    lisinopril-hydrochlorothiazide (PRINZIDE,ZESTORETIC) 20-12.5 mg per tablet    metFORMIN (GLUCOPHAGE-XR) 500 MG 24 hr tablet    NICOTROL 10 mg Crtg    walker Misc     Current Facility-Administered Medications   Medication Frequency    0.9%  NaCl infusion (for blood administration) Q24H PRN    acetaminophen tablet 650 mg 1 time in Clinic/HOD     Facility-Administered Medications Ordered in Other Visits   Medication Frequency    0.9%  NaCl infusion (for blood administration) Once       Objective:  Susan Puente arrived on time for the session.  Ms. Puente was independently mobile at the time of session. The patient was fully cooperative throughout the session.  Appearance: age appropriate,  casually dressed, adequately groomed  Behavior/Cooperation: friendly and cooperative  Speech: Not pressured, clearly audible, no slurring  Mood: euthymic  Affect: full range, appropriate to mood  Thought Process: goal-directed, logical  Thought Content: normal,  No delusions or paranoia; did not appear to be responding to internal  stimuli during the session  Orientation: grossly intact  Memory: Grossly intact  Attention Span/Concentration: Attends to session without distraction; reports no difficulty  Fund of Knowledge: average  Estimate of Intelligence: average from verbal skills and history  Cognition: grossly intact  Insight: patient has awareness of illness; good insight into own behavior and behavior of others  Judgment: the patient's behavior is adequate to circumstances    Interval history and content of current session:  Discussed activities since the last visit. Discussed current adaptation to disease and treatment status. Reports to be coping fairly well with relapse/treatment.  Patient reports positive coping and appropriate emotional and physical management strategies. Celexa is working very well. She attributes much of her current good functioning to Celexa.   Patient is performing well on her new job and has maintained volunteer activities.    Discussed revision in goals of relationship with brother.     Risk parameters:   Patient reports no suicidal ideation  Patient reports no homicidal ideation  Patient reports no self-injurious behavior  Patient reports no violent behavior      Safety needs:  None at this time      Verbal deficits: None     Patient's response to intervention:The patient's response to intervention is motivated.     Progress toward goals and other mental status changes:  The patient's progress toward goals is excellent.      Progress to date:Progress as Expected      Goals from last visit: Attempted, partially met      Patient reported outcomes:  Distress Thermometer:   Distress Score    Distress Score: 3        Practical Problems Physical Problems     Appearance: Yes     Bathing / Dressing: Yes                         Treatment Decisions: Yes  Diarrhea: Yes     Eating: Yes    Family Problems Fatigue: Yes                                   Memory / Concentration: Yes   Emotional Problems      Depression: Yes        Fears: Yes  Nose Dry / Congested: Yes            Sadness: Yes      Worry: Yes Skin Dry / Itchy: Yes                 Spiritual/Religions Concerns               Other Problems             PHQ-9= 8 (22 at initial visit)   JASON-7=2 (17 highest score)      Client Strengths: verbal, intelligent, successful, good social support, good insight, commitment to wellness, strong debbi, strong cultural traditions    Diagnosis:     ICD-10-CM ICD-9-CM   1. Adjustment disorder with mixed anxiety and depressed mood F43.23 309.28       Treatment Plan:individual psychotherapy  · Target symptoms: depression, anxiety , adjustment  · Why chosen therapy is appropriate versus another modality: relevant to diagnosis, evidence based practice  · Outcome monitoring methods: self-report, checklist/rating scale  · Therapeutic intervention type: behavior modifying psychotherapy  · Prognosis: Excellent      Behavioral goals:    Exercise:  Walk in neighborhood   Stress management:     Social engagement:  Volunteer work, increased contact with friends   Nutrition: Check blood glucose daily, Ole Cortes   Smoking Cessation: continue as per [place] group   Therapy:  Continue activity scheduling in planner       Return to clinic: increased interval  to 4 weeks     Length of Service (minutes direct face-to-face contact): 45    Nando Marie, PhD  [place] License #[id number]

## 2018-08-17 DIAGNOSIS — E11.9 TYPE 2 DIABETES MELLITUS: ICD-10-CM

## 2018-08-29 ENCOUNTER — PATIENT MESSAGE (OUTPATIENT)
Dept: HEMATOLOGY/ONCOLOGY | Facility: CLINIC | Age: 68
End: 2018-08-29

## 2018-08-29 DIAGNOSIS — F32.A DEPRESSION, UNSPECIFIED DEPRESSION TYPE: ICD-10-CM

## 2018-08-29 DIAGNOSIS — D46.C MDS (MYELODYSPLASTIC SYNDROME) WITH 5Q DELETION: ICD-10-CM

## 2018-08-30 ENCOUNTER — PATIENT MESSAGE (OUTPATIENT)
Dept: HEMATOLOGY/ONCOLOGY | Facility: CLINIC | Age: 68
End: 2018-08-30

## 2018-08-30 RX ORDER — LENALIDOMIDE 2.5 MG/1
2.5 CAPSULE ORAL DAILY
Qty: 28 CAPSULE | Refills: 0 | Status: SHIPPED | OUTPATIENT
Start: 2018-08-30 | End: 2018-10-08 | Stop reason: SDUPTHER

## 2018-08-30 RX ORDER — CITALOPRAM 20 MG/1
TABLET, FILM COATED ORAL
Qty: 30 TABLET | Refills: 2 | Status: SHIPPED | OUTPATIENT
Start: 2018-08-30 | End: 2018-11-29 | Stop reason: SDUPTHER

## 2018-09-04 ENCOUNTER — PATIENT MESSAGE (OUTPATIENT)
Dept: HEMATOLOGY/ONCOLOGY | Facility: CLINIC | Age: 68
End: 2018-09-04

## 2018-09-06 ENCOUNTER — LAB VISIT (OUTPATIENT)
Dept: LAB | Facility: HOSPITAL | Age: 68
End: 2018-09-06
Attending: INTERNAL MEDICINE
Payer: MEDICARE

## 2018-09-06 DIAGNOSIS — D46.9 MDS (MYELODYSPLASTIC SYNDROME): ICD-10-CM

## 2018-09-06 LAB
ALBUMIN SERPL BCP-MCNC: 4 G/DL
ALP SERPL-CCNC: 55 U/L
ALT SERPL W/O P-5'-P-CCNC: 28 U/L
ANION GAP SERPL CALC-SCNC: 12 MMOL/L
AST SERPL-CCNC: 16 U/L
BASOPHILS # BLD AUTO: 0.27 K/UL
BASOPHILS NFR BLD: 6.4 %
BILIRUB SERPL-MCNC: 0.6 MG/DL
BUN SERPL-MCNC: 38 MG/DL
CALCIUM SERPL-MCNC: 10 MG/DL
CHLORIDE SERPL-SCNC: 105 MMOL/L
CO2 SERPL-SCNC: 24 MMOL/L
CREAT SERPL-MCNC: 1.4 MG/DL
DIFFERENTIAL METHOD: ABNORMAL
EOSINOPHIL # BLD AUTO: 0.4 K/UL
EOSINOPHIL NFR BLD: 9.7 %
ERYTHROCYTE [DISTWIDTH] IN BLOOD BY AUTOMATED COUNT: 17 %
EST. GFR  (AFRICAN AMERICAN): 44.5 ML/MIN/1.73 M^2
EST. GFR  (NON AFRICAN AMERICAN): 38.6 ML/MIN/1.73 M^2
GLUCOSE SERPL-MCNC: 87 MG/DL
HCT VFR BLD AUTO: 27 %
HGB BLD-MCNC: 8.7 G/DL
IMM GRANULOCYTES # BLD AUTO: 0.09 K/UL
IMM GRANULOCYTES NFR BLD AUTO: 2.1 %
LYMPHOCYTES # BLD AUTO: 1.7 K/UL
LYMPHOCYTES NFR BLD: 39.2 %
MCH RBC QN AUTO: 42.2 PG
MCHC RBC AUTO-ENTMCNC: 32.2 G/DL
MCV RBC AUTO: 131 FL
MONOCYTES # BLD AUTO: 0.3 K/UL
MONOCYTES NFR BLD: 7.8 %
NEUTROPHILS # BLD AUTO: 1.5 K/UL
NEUTROPHILS NFR BLD: 34.8 %
NRBC BLD-RTO: 1 /100 WBC
PLATELET # BLD AUTO: 274 K/UL
PMV BLD AUTO: 12.2 FL
POTASSIUM SERPL-SCNC: 4.2 MMOL/L
PROT SERPL-MCNC: 7.2 G/DL
RBC # BLD AUTO: 2.06 M/UL
SODIUM SERPL-SCNC: 141 MMOL/L
WBC # BLD AUTO: 4.23 K/UL

## 2018-09-06 PROCEDURE — 80053 COMPREHEN METABOLIC PANEL: CPT

## 2018-09-06 PROCEDURE — 36415 COLL VENOUS BLD VENIPUNCTURE: CPT

## 2018-09-06 PROCEDURE — 85025 COMPLETE CBC W/AUTO DIFF WBC: CPT

## 2018-09-10 ENCOUNTER — PATIENT MESSAGE (OUTPATIENT)
Dept: HEMATOLOGY/ONCOLOGY | Facility: CLINIC | Age: 68
End: 2018-09-10

## 2018-09-10 DIAGNOSIS — Z88.9 DRUG ALLERGY: Primary | ICD-10-CM

## 2018-09-10 RX ORDER — PREDNISONE 5 MG/1
10 TABLET ORAL DAILY
Qty: 60 TABLET | Refills: 3 | Status: SHIPPED | OUTPATIENT
Start: 2018-09-10 | End: 2018-09-20

## 2018-09-10 NOTE — TELEPHONE ENCOUNTER
Spoke to pharmacy and informed them that patient will continue taking same dose of revlimid and prednisone      ----- Message from Clemencia Bullock sent at 9/10/2018 11:22 AM CDT -----  Contact: Katrin with pharmacy  Katrin calling regarding Prednisone and Revlimid       Katrin call back number  [phone]

## 2018-09-11 ENCOUNTER — TELEPHONE (OUTPATIENT)
Dept: HEMATOLOGY/ONCOLOGY | Facility: CLINIC | Age: 68
End: 2018-09-11

## 2018-09-11 ENCOUNTER — PATIENT MESSAGE (OUTPATIENT)
Dept: HEMATOLOGY/ONCOLOGY | Facility: CLINIC | Age: 68
End: 2018-09-11

## 2018-09-11 NOTE — TELEPHONE ENCOUNTER
Spoke to pharmacy.  Clarified prednisone prescription    ----- Message from Barbara Chadwick sent at 9/11/2018  9:22 AM CDT -----  Pharmacy Calling    Reason for call:needs to verify medication that was sent in error  Pharmacy Name:[facility]   Prescription Name:predniSONE (DELTASONE) 5 MG tablet  Phone Number:[phone]  Additional Information:  Thank You  ROD Chadwick

## 2018-09-12 ENCOUNTER — OFFICE VISIT (OUTPATIENT)
Dept: PSYCHIATRY | Facility: CLINIC | Age: 68
End: 2018-09-12
Payer: MEDICARE

## 2018-09-12 DIAGNOSIS — F43.23 ADJUSTMENT DISORDER WITH MIXED ANXIETY AND DEPRESSED MOOD: Primary | ICD-10-CM

## 2018-09-12 PROCEDURE — 99212 OFFICE O/P EST SF 10 MIN: CPT | Mod: PBBFAC | Performed by: PSYCHOLOGIST

## 2018-09-12 PROCEDURE — 90834 PSYTX W PT 45 MINUTES: CPT | Mod: S$PBB,,, | Performed by: PSYCHOLOGIST

## 2018-09-12 PROCEDURE — 99999 PR PBB SHADOW E&M-EST. PATIENT-LVL II: CPT | Mod: PBBFAC,,, | Performed by: PSYCHOLOGIST

## 2018-09-12 PROCEDURE — 90834 PSYTX W PT 45 MINUTES: CPT | Mod: PBBFAC | Performed by: PSYCHOLOGIST

## 2018-09-12 NOTE — PROGRESS NOTES
PSYCHO-ONCOLOGY NOTE/ Individual Psychotherapy     Date: 9/12/2018   Site:  [facility]         Therapeutic Intervention: Met with patient.  Outpatient - Behavior modifying psychotherapy 45 min - CPT code 99588    The patient's last visit with me was on 8/8/2018.    Chief complaint/reason for encounter: depression and anxiety   Met with patient to evaluate psychosocial adaptation to diagnosis/treatment of myelodysplastic syndrome  Current Medications  Current Outpatient Medications   Medication    acetaminophen (TYLENOL ARTHRITIS PAIN) 650 MG TbSR    aspirin (ECOTRIN) 81 MG EC tablet    b complex vitamins capsule    blood sugar diagnostic Strp    blood-glucose meter kit    CETIRIZINE HCL (ZYRTEC ORAL)    citalopram (CELEXA) 20 MG tablet    fish oil-omega-3 fatty acids 300-1,000 mg capsule    lancets (FREESTYLE LANCETS) 28 gauge Misc    lenalidomide (REVLIMID) 2.5 mg Cap    lisinopril-hydrochlorothiazide (PRINZIDE,ZESTORETIC) 20-12.5 mg per tablet    metFORMIN (GLUCOPHAGE-XR) 500 MG 24 hr tablet    NICOTROL 10 mg Crtg    predniSONE (DELTASONE) 5 MG tablet    walker Misc     Current Facility-Administered Medications   Medication Frequency    0.9%  NaCl infusion (for blood administration) Q24H PRN    acetaminophen tablet 650 mg 1 time in Clinic/HOD     Facility-Administered Medications Ordered in Other Visits   Medication Frequency    0.9%  NaCl infusion (for blood administration) Once       Objective:  Susan Puente arrived on time for the session.  Ms. Puente was independently mobile at the time of session. The patient was fully cooperative throughout the session.  Appearance: age appropriate,  casually dressed, adequately groomed  Behavior/Cooperation: friendly and cooperative  Speech: Not pressured, clearly audible, no slurring  Mood: euthymic  Affect: full range, appropriate to mood  Thought Process: goal-directed, logical  Thought Content: normal,  No delusions or paranoia; did  not appear to be responding to internal stimuli during the session  Orientation: grossly intact  Memory: Grossly intact  Attention Span/Concentration: Attends to session without distraction; reports no difficulty  Fund of Knowledge: average  Estimate of Intelligence: average from verbal skills and history  Cognition: grossly intact  Insight: patient has awareness of illness; good insight into own behavior and behavior of others  Judgment: the patient's behavior is adequate to circumstances    Interval history and content of current session:  Discussed activities since the last visit. Discussed current adaptation to disease and treatment status. Reports to be coping fairly well with relapse/treatment.  Patient reports some stress due to ongoing discord with brother. Coping strategies and alternate focus reviewed. Patient reports positive physical management strategies and believes Sarbjit continues working very well.  Patient is performing well on her new job, but has decreased her involvement with volunteer activities.  Social media strategies discussed.     Risk parameters:   Patient reports no suicidal ideation  Patient reports no homicidal ideation  Patient reports no self-injurious behavior  Patient reports no violent behavior      Safety needs:  None at this time      Verbal deficits: None     Patient's response to intervention:The patient's response to intervention is motivated.     Progress toward goals and other mental status changes:  The patient's progress toward goals is excellent.      Progress to date:Progress as Expected      Goals from last visit: Attempted, partially met      Patient reported outcomes:  Distress Thermometer:   Distress Score    Distress Score: 5        Practical Problems Physical Problems                                                   Family Problems                                         Emotional Problems                                                          Spiritual/Religions Concerns               Other Problems             PHQ-9= 8 (22 at initial visit)   JASON-7=3 (17 highest score)      Client Strengths: verbal, intelligent, successful, good social support, good insight, commitment to wellness, strong debbi, strong cultural traditions    Diagnosis:     ICD-10-CM ICD-9-CM   1. Adjustment disorder with mixed anxiety and depressed mood F43.23 309.28       Treatment Plan:individual psychotherapy  · Target symptoms: depression, anxiety , adjustment  · Why chosen therapy is appropriate versus another modality: relevant to diagnosis, evidence based practice  · Outcome monitoring methods: self-report, checklist/rating scale  · Therapeutic intervention type: behavior modifying psychotherapy  · Prognosis: Excellent      Behavioral goals:    Exercise:     Stress management:     Social engagement:  Return to book sorting, increased contact with friends   Nutrition:    Smoking Cessation: p   Therapy:  Continue activity scheduling in planner    FB only in evening after 7 pm       Return to clinic: 3 weeks     Length of Service (minutes direct face-to-face contact): 45    Nando Marie, PhD  [place] License #[id number]

## 2018-09-20 DIAGNOSIS — E11.9 TYPE 2 DIABETES MELLITUS: ICD-10-CM

## 2018-09-24 DIAGNOSIS — E11.9 CONTROLLED TYPE 2 DIABETES MELLITUS WITHOUT COMPLICATION, WITHOUT LONG-TERM CURRENT USE OF INSULIN: ICD-10-CM

## 2018-09-24 RX ORDER — METFORMIN HYDROCHLORIDE 500 MG/1
500 TABLET, EXTENDED RELEASE ORAL
Qty: 90 TABLET | Refills: 3 | Status: SHIPPED | OUTPATIENT
Start: 2018-09-24 | End: 2020-01-02

## 2018-10-08 ENCOUNTER — PATIENT MESSAGE (OUTPATIENT)
Dept: HEMATOLOGY/ONCOLOGY | Facility: CLINIC | Age: 68
End: 2018-10-08

## 2018-10-08 DIAGNOSIS — D46.C MDS (MYELODYSPLASTIC SYNDROME) WITH 5Q DELETION: ICD-10-CM

## 2018-10-08 RX ORDER — LENALIDOMIDE 2.5 MG/1
2.5 CAPSULE ORAL DAILY
Qty: 28 CAPSULE | Refills: 0 | Status: SHIPPED | OUTPATIENT
Start: 2018-10-08 | End: 2018-11-05 | Stop reason: DRUGHIGH

## 2018-10-08 RX ORDER — PREDNISONE 5 MG/1
TABLET ORAL
Qty: 60 TABLET | Refills: 0 | Status: SHIPPED | OUTPATIENT
Start: 2018-10-08 | End: 2019-02-21 | Stop reason: SDUPTHER

## 2018-10-08 NOTE — TELEPHONE ENCOUNTER
Attempted to call patient to schedule appt. Left message    Prescription refill request sent to dr Valdes.        ----- Message from Barbara Chadwick sent at 10/8/2018  8:24 AM CDT -----  Pharmacy Calling    Reason for call:Request New Prescription  Pharmacy Name:[facility]   Prescription Name:lenalidomide (REVLIMID) 2.5 mg Cap  Phone Number:[phone]  Additional Information:  Thank You  ROD Chadwick

## 2018-10-09 ENCOUNTER — PATIENT MESSAGE (OUTPATIENT)
Dept: HEMATOLOGY/ONCOLOGY | Facility: CLINIC | Age: 68
End: 2018-10-09

## 2018-10-09 ENCOUNTER — TELEPHONE (OUTPATIENT)
Dept: HEMATOLOGY/ONCOLOGY | Facility: CLINIC | Age: 68
End: 2018-10-09

## 2018-10-09 NOTE — TELEPHONE ENCOUNTER
Spoke to pharmacy. Clarified prednisone prescription    ----- Message from Clemencia Bullock sent at 10/9/2018 11:28 AM CDT -----  Contact: Radha with pharmacy  Radha calling regarding directions for Prednisone       Radha call back number [phone]

## 2018-10-15 ENCOUNTER — LAB VISIT (OUTPATIENT)
Dept: LAB | Facility: HOSPITAL | Age: 68
End: 2018-10-15
Attending: INTERNAL MEDICINE
Payer: MEDICARE

## 2018-10-15 ENCOUNTER — OFFICE VISIT (OUTPATIENT)
Dept: HEMATOLOGY/ONCOLOGY | Facility: CLINIC | Age: 68
End: 2018-10-15
Payer: MEDICARE

## 2018-10-15 VITALS
HEIGHT: 65 IN | TEMPERATURE: 98 F | RESPIRATION RATE: 19 BRPM | SYSTOLIC BLOOD PRESSURE: 130 MMHG | DIASTOLIC BLOOD PRESSURE: 60 MMHG | WEIGHT: 201.5 LBS | OXYGEN SATURATION: 96 % | HEART RATE: 87 BPM | BODY MASS INDEX: 33.57 KG/M2

## 2018-10-15 DIAGNOSIS — D46.C MDS (MYELODYSPLASTIC SYNDROME) WITH 5Q DELETION: Primary | ICD-10-CM

## 2018-10-15 DIAGNOSIS — D46.9 MDS (MYELODYSPLASTIC SYNDROME): ICD-10-CM

## 2018-10-15 DIAGNOSIS — K52.1 DIARRHEA DUE TO DRUG: ICD-10-CM

## 2018-10-15 LAB
ALBUMIN SERPL BCP-MCNC: 3.7 G/DL
ALP SERPL-CCNC: 64 U/L
ALT SERPL W/O P-5'-P-CCNC: 24 U/L
ANION GAP SERPL CALC-SCNC: 11 MMOL/L
AST SERPL-CCNC: 15 U/L
BASOPHILS # BLD AUTO: 0.16 K/UL
BASOPHILS NFR BLD: 3.8 %
BILIRUB SERPL-MCNC: 0.5 MG/DL
BUN SERPL-MCNC: 32 MG/DL
CALCIUM SERPL-MCNC: 9.9 MG/DL
CHLORIDE SERPL-SCNC: 107 MMOL/L
CO2 SERPL-SCNC: 23 MMOL/L
CREAT SERPL-MCNC: 1.4 MG/DL
DIFFERENTIAL METHOD: ABNORMAL
EOSINOPHIL # BLD AUTO: 0.4 K/UL
EOSINOPHIL NFR BLD: 8.7 %
ERYTHROCYTE [DISTWIDTH] IN BLOOD BY AUTOMATED COUNT: 17.3 %
EST. GFR  (AFRICAN AMERICAN): 44.5 ML/MIN/1.73 M^2
EST. GFR  (NON AFRICAN AMERICAN): 38.6 ML/MIN/1.73 M^2
GLUCOSE SERPL-MCNC: 80 MG/DL
HCT VFR BLD AUTO: 22.7 %
HGB BLD-MCNC: 7.2 G/DL
IMM GRANULOCYTES # BLD AUTO: 0.08 K/UL
IMM GRANULOCYTES NFR BLD AUTO: 1.9 %
LYMPHOCYTES # BLD AUTO: 1.4 K/UL
LYMPHOCYTES NFR BLD: 32.6 %
MCH RBC QN AUTO: 41.6 PG
MCHC RBC AUTO-ENTMCNC: 31.7 G/DL
MCV RBC AUTO: 131 FL
MONOCYTES # BLD AUTO: 0.5 K/UL
MONOCYTES NFR BLD: 10.8 %
NEUTROPHILS # BLD AUTO: 1.8 K/UL
NEUTROPHILS NFR BLD: 42.2 %
NRBC BLD-RTO: 1 /100 WBC
PLATELET # BLD AUTO: 239 K/UL
PMV BLD AUTO: 12.4 FL
POTASSIUM SERPL-SCNC: 4.3 MMOL/L
PROT SERPL-MCNC: 7.2 G/DL
RBC # BLD AUTO: 1.73 M/UL
SODIUM SERPL-SCNC: 141 MMOL/L
WBC # BLD AUTO: 4.26 K/UL

## 2018-10-15 PROCEDURE — 36415 COLL VENOUS BLD VENIPUNCTURE: CPT

## 2018-10-15 PROCEDURE — 99999 PR PBB SHADOW E&M-EST. PATIENT-LVL IV: CPT | Mod: PBBFAC,,, | Performed by: INTERNAL MEDICINE

## 2018-10-15 PROCEDURE — 3075F SYST BP GE 130 - 139MM HG: CPT | Mod: CPTII,,, | Performed by: INTERNAL MEDICINE

## 2018-10-15 PROCEDURE — 99215 OFFICE O/P EST HI 40 MIN: CPT | Mod: S$PBB,,, | Performed by: INTERNAL MEDICINE

## 2018-10-15 PROCEDURE — 1101F PT FALLS ASSESS-DOCD LE1/YR: CPT | Mod: CPTII,,, | Performed by: INTERNAL MEDICINE

## 2018-10-15 PROCEDURE — 99214 OFFICE O/P EST MOD 30 MIN: CPT | Mod: PBBFAC | Performed by: INTERNAL MEDICINE

## 2018-10-15 PROCEDURE — 85025 COMPLETE CBC W/AUTO DIFF WBC: CPT

## 2018-10-15 PROCEDURE — 80053 COMPREHEN METABOLIC PANEL: CPT

## 2018-10-15 PROCEDURE — 3078F DIAST BP <80 MM HG: CPT | Mod: CPTII,,, | Performed by: INTERNAL MEDICINE

## 2018-10-15 RX ORDER — BLOOD-GLUCOSE METER
EACH MISCELLANEOUS
COMMUNITY
Start: 2018-07-13 | End: 2020-03-05

## 2018-10-15 NOTE — PROGRESS NOTES
Subjective:       Patient ID: Susan Puente is a 68 y.o. female.    Chief Complaint: No chief complaint on file.    Patient presents today for follow up of her history of MDS 5 q del syndrome.  Revlimid had been stopped since June 2017 after she developed pancytopenia while on Revlimid (required desensitization). CBC was normal for almost 1 year and marrow was negative for del5q. Bone marrow biopsy repeat from last week shows relapsed 5q minus MDS without new or additional mutations. Hemoglobin is 7.7 grams and has remained stable after restarting Revlimid at 2.5mg daily with prednisone to prevent allergic reaction. Having severe diarrhea since restarting Revlimid, 8-12 episodes daily. Now on BRAT diet. Has not tried OTC imodium yet.     History   Ms. Puente is a 66 year old female with hx of DM2, peripheral vascular disease, tobacco use, CAD, hyperlipidemia, hypertension who was hospitalized 11/10/16 for anemia. hgb 5.3  MCH 43.4 with normal WBC and platelets. Patient had normal iron stores. She was transfused 3 units of PRBCs and discharged home with hgb 8.7 on 11/11/16. She was referred for further evalutation of her anemia. On 12/16/16 patient had a normal SPEP and immunofixation. Slightly elevated kappa light chains with normal ration. Elevated vitamin b12, normal folate, JAYDEN was positive with a low titer and negative profile. Patient had a bone marrow biopsy 1/5/16 which showed the core biopsy is normocellular for age (40%); however, megakaryocytes are increased and show  frequent small, hypolobated forms. Additionally, a subset of the neutrophils are hypogranular. Blasts are not increased by either morphology (1.2%) or in the corresponding flow cytometric analysis. Fish detects a 5q deletion in 54.5% of nuclei. Cytogenetics reported 20 metaphases, 2 metaphases were normal and 18 metaphases had a 5q deletion. No additional cytogenetic abnormalities were detected. Findings consistent with 5q  deletion syndrome.     Patient had a delay in obtaining revlimid 10 mg daily but did start taking the medication 2/8/17. On 2/9/17 patient developed a diffuse maculopapular rash throughout scalp arms, legs and torso. She states she had no stridor or wheezing. She discontinued medication 2/15/17. Went to allergist who provided references on desensitization so that patient could resume Revlimid. Unfortunately developed pancytopenia and Revlimid stopped 6/16/17.   She had a repeat BMBX  performed 6/8/17 and showed a hypercellular marrow (60%) continued atypia in the granulocytes and megakaryocytes were noted.  Additionally, there is erythroid atypia present. No increase in blasts. Cytogenetics are normal and MDS FISH is negative, failing to show 5 q minus. NGS should no significant molecular mutations.  Anemia work up revealed bienvenido negative hemolysis.      Review of Systems   Constitutional: Positive for fatigue. Negative for fever.   HENT: Negative for congestion and trouble swallowing.    Respiratory: Positive for shortness of breath. Negative for cough.    Cardiovascular: Negative for chest pain and leg swelling.   Gastrointestinal: Positive for diarrhea. Negative for abdominal distention and abdominal pain.   Endocrine: Negative for polyphagia and polyuria.   Genitourinary: Negative for dysuria and urgency.   Musculoskeletal: Positive for arthralgias. Negative for myalgias.   Skin: Negative for color change and pallor.   Neurological: Negative for light-headedness and headaches.   Hematological: Negative for adenopathy. Does not bruise/bleed easily.   Psychiatric/Behavioral: Negative for agitation and behavioral problems.       Objective:      Physical Exam   Constitutional: She is oriented to person, place, and time. She appears well-developed and well-nourished.   HENT:   Mouth/Throat: Oropharynx is clear and moist. No oropharyngeal exudate.   Eyes: Conjunctivae are normal. Pupils are equal, round, and reactive  to light.   Cardiovascular: Normal rate and regular rhythm.   Pulmonary/Chest: Effort normal and breath sounds normal.   Abdominal: Soft. Bowel sounds are normal.   Musculoskeletal: Normal range of motion.   Lymphadenopathy:     She has no cervical adenopathy.   Neurological: She is alert and oriented to person, place, and time.   Skin: Skin is warm and dry.   Psychiatric: She has a normal mood and affect. Her behavior is normal.       Assessment:       1. MDS (myelodysplastic syndrome) with 5q deletion    2. Diarrhea due to drug        Plan:   MDS. Initially with 5 q minus syndrome and treated with Revlimid. Developed allergy and was then desensitized. Soon after developed pancytopenia and Revlimid held since June 2017.  BMBX,6/8/17, had normal cytogenetics. Repeat marrow from 6/7/18 shows relapsed 5q minus. Discussed treatment options of HMA therapy or repeat trial of Revlimid.   Plan to treat diarrhea with imodium and try to increase Revlimid to 5mg daily.    Follow-up CBC in 4 weeks  Follow-up visit with CBC in 8 weeks.

## 2018-10-29 DIAGNOSIS — N18.9 CHRONIC KIDNEY DISEASE, UNSPECIFIED CKD STAGE: ICD-10-CM

## 2018-11-05 ENCOUNTER — PATIENT MESSAGE (OUTPATIENT)
Dept: HEMATOLOGY/ONCOLOGY | Facility: CLINIC | Age: 68
End: 2018-11-05

## 2018-11-05 DIAGNOSIS — D46.C MDS (MYELODYSPLASTIC SYNDROME) WITH 5Q DELETION: Primary | ICD-10-CM

## 2018-11-05 RX ORDER — LENALIDOMIDE 5 MG/1
5 CAPSULE ORAL DAILY
Qty: 28 EACH | Refills: 0 | Status: SHIPPED | OUTPATIENT
Start: 2018-11-05 | End: 2018-11-30 | Stop reason: DRUGHIGH

## 2018-11-11 ENCOUNTER — PATIENT MESSAGE (OUTPATIENT)
Dept: HEMATOLOGY/ONCOLOGY | Facility: CLINIC | Age: 68
End: 2018-11-11

## 2018-11-12 ENCOUNTER — INFUSION (OUTPATIENT)
Dept: INFUSION THERAPY | Facility: HOSPITAL | Age: 68
End: 2018-11-12
Attending: INTERNAL MEDICINE
Payer: MEDICARE

## 2018-11-12 ENCOUNTER — TELEPHONE (OUTPATIENT)
Dept: HEMATOLOGY/ONCOLOGY | Facility: CLINIC | Age: 68
End: 2018-11-12

## 2018-11-12 VITALS
SYSTOLIC BLOOD PRESSURE: 122 MMHG | TEMPERATURE: 98 F | RESPIRATION RATE: 18 BRPM | HEART RATE: 81 BPM | DIASTOLIC BLOOD PRESSURE: 55 MMHG

## 2018-11-12 DIAGNOSIS — D46.C MDS (MYELODYSPLASTIC SYNDROME) WITH 5Q DELETION: ICD-10-CM

## 2018-11-12 DIAGNOSIS — D46.9 MYELODYSPLASTIC SYNDROME: Primary | ICD-10-CM

## 2018-11-12 PROCEDURE — P9040 RBC LEUKOREDUCED IRRADIATED: HCPCS

## 2018-11-12 PROCEDURE — 36430 TRANSFUSION BLD/BLD COMPNT: CPT

## 2018-11-12 PROCEDURE — 86920 COMPATIBILITY TEST SPIN: CPT

## 2018-11-12 PROCEDURE — 25000003 PHARM REV CODE 250: Performed by: INTERNAL MEDICINE

## 2018-11-12 RX ORDER — HYDROCODONE BITARTRATE AND ACETAMINOPHEN 500; 5 MG/1; MG/1
TABLET ORAL ONCE
Status: COMPLETED | OUTPATIENT
Start: 2018-11-12 | End: 2018-11-12

## 2018-11-12 RX ORDER — HYDROCODONE BITARTRATE AND ACETAMINOPHEN 500; 5 MG/1; MG/1
TABLET ORAL ONCE
Status: CANCELLED | OUTPATIENT
Start: 2018-11-12 | End: 2018-11-12

## 2018-11-12 RX ORDER — ACETAMINOPHEN 325 MG/1
650 TABLET ORAL
Status: CANCELLED | OUTPATIENT
Start: 2018-11-12

## 2018-11-12 RX ORDER — DIPHENHYDRAMINE HCL 25 MG
25 CAPSULE ORAL
Status: CANCELLED | OUTPATIENT
Start: 2018-11-12

## 2018-11-12 RX ORDER — DIPHENHYDRAMINE HCL 25 MG
25 CAPSULE ORAL
Status: COMPLETED | OUTPATIENT
Start: 2018-11-12 | End: 2018-11-12

## 2018-11-12 RX ORDER — ACETAMINOPHEN 325 MG/1
650 TABLET ORAL
Status: COMPLETED | OUTPATIENT
Start: 2018-11-12 | End: 2018-11-12

## 2018-11-12 RX ADMIN — ACETAMINOPHEN 650 MG: 325 TABLET ORAL at 01:11

## 2018-11-12 RX ADMIN — DIPHENHYDRAMINE HYDROCHLORIDE 25 MG: 25 CAPSULE ORAL at 01:11

## 2018-11-12 RX ADMIN — SODIUM CHLORIDE: 9 INJECTION, SOLUTION INTRAVENOUS at 01:11

## 2018-11-12 NOTE — PLAN OF CARE
Problem: Patient Care Overview (Adult)  Goal: Plan of Care Review  Outcome: Ongoing (interventions implemented as appropriate)  1758-Patient tolerated 2 unit PRBCs well. Discharged without complaints or S/S of adverse event. AVS given. Outpatient blood transfusion reaction handout given and educated on s/s of delayed reaction.  Instructed to call provider for any questions or concerns. Verbalized understanding.

## 2018-11-26 ENCOUNTER — PATIENT MESSAGE (OUTPATIENT)
Dept: HEMATOLOGY/ONCOLOGY | Facility: CLINIC | Age: 68
End: 2018-11-26

## 2018-11-27 ENCOUNTER — INFUSION (OUTPATIENT)
Dept: INFUSION THERAPY | Facility: HOSPITAL | Age: 68
End: 2018-11-27
Attending: INTERNAL MEDICINE
Payer: MEDICARE

## 2018-11-27 VITALS
HEART RATE: 80 BPM | SYSTOLIC BLOOD PRESSURE: 115 MMHG | DIASTOLIC BLOOD PRESSURE: 56 MMHG | TEMPERATURE: 98 F | RESPIRATION RATE: 18 BRPM

## 2018-11-27 DIAGNOSIS — D64.9 ANEMIA, UNSPECIFIED TYPE: Primary | ICD-10-CM

## 2018-11-27 DIAGNOSIS — D64.9 ANEMIA, UNSPECIFIED TYPE: ICD-10-CM

## 2018-11-27 PROCEDURE — 36430 TRANSFUSION BLD/BLD COMPNT: CPT

## 2018-11-27 PROCEDURE — 86920 COMPATIBILITY TEST SPIN: CPT

## 2018-11-27 PROCEDURE — P9040 RBC LEUKOREDUCED IRRADIATED: HCPCS

## 2018-11-27 PROCEDURE — 25000003 PHARM REV CODE 250: Performed by: NURSE PRACTITIONER

## 2018-11-27 RX ORDER — HYDROCODONE BITARTRATE AND ACETAMINOPHEN 500; 5 MG/1; MG/1
TABLET ORAL ONCE
Status: CANCELLED | OUTPATIENT
Start: 2018-11-27 | End: 2018-11-27

## 2018-11-27 RX ORDER — ACETAMINOPHEN 325 MG/1
650 TABLET ORAL
Status: CANCELLED | OUTPATIENT
Start: 2018-11-27

## 2018-11-27 RX ORDER — DIPHENHYDRAMINE HCL 25 MG
25 CAPSULE ORAL
Status: CANCELLED | OUTPATIENT
Start: 2018-11-27

## 2018-11-27 RX ORDER — HYDROCODONE BITARTRATE AND ACETAMINOPHEN 500; 5 MG/1; MG/1
TABLET ORAL ONCE
Status: COMPLETED | OUTPATIENT
Start: 2018-11-27 | End: 2018-11-27

## 2018-11-27 RX ORDER — DIPHENHYDRAMINE HCL 25 MG
25 CAPSULE ORAL
Status: COMPLETED | OUTPATIENT
Start: 2018-11-27 | End: 2018-11-27

## 2018-11-27 RX ORDER — ACETAMINOPHEN 325 MG/1
650 TABLET ORAL
Status: COMPLETED | OUTPATIENT
Start: 2018-11-27 | End: 2018-11-27

## 2018-11-27 RX ADMIN — SODIUM CHLORIDE: 0.9 INJECTION, SOLUTION INTRAVENOUS at 01:11

## 2018-11-27 RX ADMIN — DIPHENHYDRAMINE HYDROCHLORIDE 25 MG: 25 CAPSULE ORAL at 01:11

## 2018-11-27 RX ADMIN — ACETAMINOPHEN 650 MG: 325 TABLET ORAL at 01:11

## 2018-11-27 NOTE — PLAN OF CARE
Problem: Patient Care Overview (Adult)  Goal: Plan of Care Review  Outcome: Ongoing (interventions implemented as appropriate)  1600-Patient tolerated 1 unit PRBCs well. Discharged without complaints or S/S of adverse event. AVS given. Outpatient blood transfusion reaction handout given and educated on s/s of delayed reaction.  Instructed to call provider for any questions or concerns. Verbalized understanding.

## 2018-11-27 NOTE — PLAN OF CARE
Problem: Patient Care Overview (Adult)  Goal: Individualization & Mutuality  Outcome: Ongoing (interventions implemented as appropriate)  1300-Labs , hx, and medications reviewed, patient is here for 1 unit of blood. Assessment completed. Discussed plan of care with patient. Patient in agreement. Chair reclined and warm blanket and snack offered.

## 2018-11-29 DIAGNOSIS — F32.A DEPRESSION, UNSPECIFIED DEPRESSION TYPE: ICD-10-CM

## 2018-11-29 DIAGNOSIS — D46.C MDS (MYELODYSPLASTIC SYNDROME) WITH 5Q DELETION: ICD-10-CM

## 2018-11-29 RX ORDER — CITALOPRAM 20 MG/1
TABLET, FILM COATED ORAL
Qty: 30 TABLET | Refills: 2 | Status: SHIPPED | OUTPATIENT
Start: 2018-11-29 | End: 2019-02-21 | Stop reason: SDUPTHER

## 2018-11-30 ENCOUNTER — PATIENT MESSAGE (OUTPATIENT)
Dept: HEMATOLOGY/ONCOLOGY | Facility: CLINIC | Age: 68
End: 2018-11-30

## 2018-12-02 RX ORDER — LENALIDOMIDE 2.5 MG/1
7.5 CAPSULE ORAL DAILY
Qty: 42 CAPSULE | Refills: 0 | Status: SHIPPED | OUTPATIENT
Start: 2018-12-02 | End: 2018-12-28

## 2018-12-03 ENCOUNTER — PATIENT MESSAGE (OUTPATIENT)
Dept: HEMATOLOGY/ONCOLOGY | Facility: CLINIC | Age: 68
End: 2018-12-03

## 2018-12-03 ENCOUNTER — TELEPHONE (OUTPATIENT)
Dept: HEMATOLOGY/ONCOLOGY | Facility: CLINIC | Age: 68
End: 2018-12-03

## 2018-12-03 ENCOUNTER — OFFICE VISIT (OUTPATIENT)
Dept: CARDIOLOGY | Facility: CLINIC | Age: 68
End: 2018-12-03
Payer: MEDICARE

## 2018-12-03 VITALS
HEIGHT: 65 IN | SYSTOLIC BLOOD PRESSURE: 111 MMHG | BODY MASS INDEX: 32.83 KG/M2 | DIASTOLIC BLOOD PRESSURE: 53 MMHG | WEIGHT: 197.06 LBS | HEART RATE: 95 BPM

## 2018-12-03 DIAGNOSIS — N18.30 CONTROLLED TYPE 2 DIABETES MELLITUS WITH STAGE 3 CHRONIC KIDNEY DISEASE, WITHOUT LONG-TERM CURRENT USE OF INSULIN: ICD-10-CM

## 2018-12-03 DIAGNOSIS — E78.00 HYPERCHOLESTEREMIA: ICD-10-CM

## 2018-12-03 DIAGNOSIS — E11.22 CONTROLLED TYPE 2 DIABETES MELLITUS WITH STAGE 3 CHRONIC KIDNEY DISEASE, WITHOUT LONG-TERM CURRENT USE OF INSULIN: ICD-10-CM

## 2018-12-03 DIAGNOSIS — D46.C MDS (MYELODYSPLASTIC SYNDROME) WITH 5Q DELETION: ICD-10-CM

## 2018-12-03 DIAGNOSIS — I25.10 CORONARY ARTERY DISEASE, ANGINA PRESENCE UNSPECIFIED, UNSPECIFIED VESSEL OR LESION TYPE, UNSPECIFIED WHETHER NATIVE OR TRANSPLANTED HEART: Primary | ICD-10-CM

## 2018-12-03 DIAGNOSIS — Z98.61 CAD S/P PERCUTANEOUS CORONARY ANGIOPLASTY: ICD-10-CM

## 2018-12-03 DIAGNOSIS — D46.9 MYELODYSPLASTIC SYNDROME: ICD-10-CM

## 2018-12-03 DIAGNOSIS — Z98.890 HISTORY OF CEA (CAROTID ENDARTERECTOMY): ICD-10-CM

## 2018-12-03 DIAGNOSIS — I25.10 CAD S/P PERCUTANEOUS CORONARY ANGIOPLASTY: ICD-10-CM

## 2018-12-03 DIAGNOSIS — N18.30 STAGE 3 CHRONIC KIDNEY DISEASE: ICD-10-CM

## 2018-12-03 DIAGNOSIS — I10 ESSENTIAL HYPERTENSION: ICD-10-CM

## 2018-12-03 PROCEDURE — 99999 PR PBB SHADOW E&M-EST. PATIENT-LVL IV: CPT | Mod: PBBFAC,,, | Performed by: INTERNAL MEDICINE

## 2018-12-03 PROCEDURE — 1101F PT FALLS ASSESS-DOCD LE1/YR: CPT | Mod: CPTII,S$GLB,, | Performed by: INTERNAL MEDICINE

## 2018-12-03 PROCEDURE — 3074F SYST BP LT 130 MM HG: CPT | Mod: CPTII,S$GLB,, | Performed by: INTERNAL MEDICINE

## 2018-12-03 PROCEDURE — 99215 OFFICE O/P EST HI 40 MIN: CPT | Mod: S$GLB,,, | Performed by: INTERNAL MEDICINE

## 2018-12-03 PROCEDURE — 3044F HG A1C LEVEL LT 7.0%: CPT | Mod: CPTII,S$GLB,, | Performed by: INTERNAL MEDICINE

## 2018-12-03 PROCEDURE — 3078F DIAST BP <80 MM HG: CPT | Mod: CPTII,S$GLB,, | Performed by: INTERNAL MEDICINE

## 2018-12-03 RX ORDER — MAGNESIUM 30 MG
TABLET ORAL ONCE
Status: ON HOLD | COMMUNITY
End: 2022-01-01 | Stop reason: HOSPADM

## 2018-12-03 NOTE — PROGRESS NOTES
Subjective:   Patient ID:  Susan Puente is a 68 y.o. female who presents for follow-up of MDS (myelodysplastic syndrome) and Shortness of Breath     2005-PCI in RCA (3 stents in RCA [facility], Dr ELIZABET Byers)  ?ASx at that time-->Elevated BP-->? Tightness in neck/shoulders-->Stress test-->LHC  HTN  HL  Non-insulin requiring DM (on metformin)  2011-L-CEA @ [facility]  Current smoker (quit 2 in the past, smoking since the age of 19)   (+)premature coronary disease @ 46 paternally     Carotid US 4/16  There is 40 - 49% right Internal Carotid stenosis.  There is 40 - 49% left Internal Carotid stenosis.     NM stress test 2016  Impression: NORMAL MYOCARDIAL PERFUSION  1. The perfusion scan is free of evidence for myocardial ischemia or injury.   2. Resting wall motion is physiologic.   3. Visually estimated LV function is normal.   4. The ventricular volumes are normal at rest and stress.   5. The extracardiac distribution of radioactivity is normal.   6. There was no previous study available to compare.     HPI:   Patient does not exercise.   Patient says that she absolutely cannot tolerate MTP, it is causing insomnia, nervousness and has has has her BP High she does not want to take it for now.   Recent diagnosis of MDS on Renlivid. Patient had an allergic reaction- de sensitization was done. Patient went into remission and then had a relapse. 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    No chest pain, Orthopnea, PND of heart failure symptoms.   Patient get SOB going up the stairs- thinks that this is related to anemia.  Occasional pounding in the chest with exertion.   Patient quit smoking.      Patient Active Problem List   Diagnosis    Controlled type 2 diabetes mellitus with stage 3 chronic kidney disease, without long-term current use of insulin    CAD (coronary artery disease)    Bilateral carotid artery disease    Hearing loss in right ear    Anemia requiring transfusions    Macrocytic anemia     "Hypercalcemia    Essential hypertension    Calcification of aorta    Stage 3 chronic kidney disease    Myelodysplastic syndrome    MDS (myelodysplastic syndrome) with 5q deletion    Adjustment disorder with mixed anxiety and depressed mood    MDS (myelodysplastic syndrome)     BP (!) 111/53 (BP Location: Left arm, Patient Position: Sitting, BP Method: Large (Automatic))   Pulse 95   Ht 5' 5" (1.651 m)   Wt 89.4 kg (197 lb 1.5 oz)   BMI 32.80 kg/m²   Body mass index is 32.8 kg/m².  Estimated Creatinine Clearance: 39.7 mL/min (A) (based on SCr of 1.5 mg/dL (H)).    Lab Results   Component Value Date     11/27/2018    K 4.6 11/27/2018     11/27/2018    CO2 19 (L) 11/27/2018    BUN 37 (H) 11/27/2018    CREATININE 1.5 (H) 11/27/2018     (H) 11/27/2018    HGBA1C 5.3 12/28/2017    MG 1.6 11/11/2016    AST 13 11/27/2018    ALT 21 11/27/2018    ALBUMIN 3.5 11/27/2018    PROT 7.0 11/27/2018    BILITOT 0.6 11/27/2018    WBC 3.51 (L) 11/27/2018    HGB 6.9 (L) 11/27/2018    HCT 22.0 (L) 11/27/2018     (H) 11/27/2018     11/27/2018    TSH 1.357 11/10/2016    CHOL 236 (H) 12/28/2017    HDL 56 12/28/2017    LDLCALC 149.4 12/28/2017    TRIG 153 (H) 12/28/2017       Current Outpatient Medications   Medication Sig    acetaminophen (TYLENOL ARTHRITIS PAIN) 650 MG TbSR Take 650 mg by mouth every 8 (eight) hours as needed (for pain).    aspirin (ECOTRIN) 81 MG EC tablet Take 81 mg by mouth once daily.    b complex vitamins capsule Take 1 capsule by mouth once daily.    blood sugar diagnostic Strp 1 each by Misc.(Non-Drug; Combo Route) route once daily.    blood-glucose meter kit Use as instructed    CETIRIZINE HCL (ZYRTEC ORAL) Take by mouth.    citalopram (CELEXA) 20 MG tablet TAKE ONE TABLET BY MOUTH EVERY DAY    fexofenadine 30 mg TbDL Take by mouth.    fish oil-omega-3 fatty acids 300-1,000 mg capsule Take 4 g by mouth nightly.     lancets (FREESTYLE LANCETS) 28 gauge Misc 1 " lancet by Misc.(Non-Drug; Combo Route) route once daily.    lenalidomide (REVLIMID) 2.5 mg Cap Take 7.5 mg by mouth once daily auth # [id number] on 11/30/18.    magnesium 30 mg Tab Take by mouth once.    metFORMIN (GLUCOPHAGE-XR) 500 MG 24 hr tablet Take 1 tablet (500 mg total) by mouth daily with breakfast.    NICOTROL 10 mg Crtg INHALE ONE PUFF INTO THE MOUTH AS NEEDED MAXIMUM 6 CARTRIDGES/DAY.    potassium 99 mg Tab Take by mouth once.    predniSONE (DELTASONE) 5 MG tablet Take 2 tablets by mouth as directed by MD in clinic.    TRUE METRIX GLUCOSE METER Misc     walker Misc 1 each by Misc.(Non-Drug; Combo Route) route once daily at 6am.    lisinopril-hydrochlorothiazide (PRINZIDE,ZESTORETIC) 20-12.5 mg per tablet Take 2 tablets by mouth once daily.     Current Facility-Administered Medications   Medication    0.9%  NaCl infusion (for blood administration)    acetaminophen tablet 650 mg     Facility-Administered Medications Ordered in Other Visits   Medication    0.9%  NaCl infusion (for blood administration)       Review of Systems   Constitution: Negative for chills, decreased appetite, weakness, malaise/fatigue, night sweats, weight gain and weight loss.   Eyes: Negative for blurred vision, double vision, visual disturbance and visual halos.   Cardiovascular: Positive for dyspnea on exertion. Negative for chest pain, claudication, cyanosis, irregular heartbeat, leg swelling, near-syncope, orthopnea, palpitations, paroxysmal nocturnal dyspnea and syncope.   Respiratory: Negative for cough, hemoptysis, snoring, sputum production and wheezing.    Endocrine: Negative for cold intolerance, heat intolerance, polydipsia and polyphagia.   Hematologic/Lymphatic: Negative for adenopathy and bleeding problem. Does not bruise/bleed easily.   Skin: Negative for flushing, itching, poor wound healing and rash.   Musculoskeletal: Positive for arthritis. Negative for back pain, falls, gout, joint pain, joint swelling,  muscle cramps, muscle weakness, myalgias, neck pain and stiffness.   Gastrointestinal: Negative for bloating, abdominal pain, anorexia, diarrhea, dysphagia, excessive appetite, flatus, hematemesis, jaundice, melena and nausea.   Genitourinary: Negative for hesitancy and incomplete emptying.   Neurological: Negative for aphonia, brief paralysis, difficulty with concentration, disturbances in coordination, excessive daytime sleepiness, dizziness, focal weakness, light-headedness and loss of balance.   Psychiatric/Behavioral: Negative for altered mental status, depression, hallucinations, hypervigilance, memory loss, substance abuse and suicidal ideas. The patient does not have insomnia and is not nervous/anxious.        Objective:   Physical Exam   Constitutional: She is oriented to person, place, and time. She appears well-developed and well-nourished. No distress.   HENT:   Head: Normocephalic and atraumatic.   Nose: Nose normal.   Mouth/Throat: Oropharynx is clear and moist. No oropharyngeal exudate.   Eyes: Conjunctivae and EOM are normal. Pupils are equal, round, and reactive to light. Right eye exhibits no discharge. Left eye exhibits no discharge. No scleral icterus.   Neck: Normal range of motion. Neck supple. No JVD present. No tracheal deviation present. No thyromegaly present.   Cardiovascular: Normal rate, regular rhythm, normal heart sounds and intact distal pulses. Exam reveals no gallop and no friction rub.   No murmur heard.  Pulmonary/Chest: Effort normal and breath sounds normal. No stridor. No respiratory distress. She has no wheezes. She has no rales. She exhibits no tenderness.   Abdominal: Soft. Bowel sounds are normal. She exhibits no distension and no mass. There is no tenderness. There is no rebound and no guarding.   Musculoskeletal: Normal range of motion. She exhibits no edema or tenderness.   Lymphadenopathy:     She has no cervical adenopathy.   Neurological: She is alert and oriented to  person, place, and time. She has normal reflexes. No cranial nerve deficit. She exhibits normal muscle tone. Coordination normal.   Skin: Skin is warm. No rash noted. She is not diaphoretic. No erythema. No pallor.   Psychiatric: She has a normal mood and affect. Her behavior is normal. Judgment and thought content normal.       Assessment:     1. Coronary artery disease, angina presence unspecified, unspecified vessel or lesion type, unspecified whether native or transplanted heart    2. Essential hypertension    3. Stage 3 chronic kidney disease    4. Myelodysplastic syndrome    5. MDS (myelodysplastic syndrome) with 5q deletion    6. Controlled type 2 diabetes mellitus with stage 3 chronic kidney disease, without long-term current use of insulin    7. CAD S/P percutaneous coronary angioplasty    8. Hypercholesteremia    9. History of CEA (carotid endarterectomy)        Plan:   Susan was seen today for mds (myelodysplastic syndrome) and shortness of breath.    Diagnoses and all orders for this visit:    Coronary artery disease, angina presence unspecified, unspecified vessel or lesion type, unspecified whether native or transplanted heart  -     Ultrasound doppler carotid (Cupid Only); Future    Essential hypertension    Stage 3 chronic kidney disease    Myelodysplastic syndrome    MDS (myelodysplastic syndrome) with 5q deletion    Controlled type 2 diabetes mellitus with stage 3 chronic kidney disease, without long-term current use of insulin    CAD S/P percutaneous coronary angioplasty    Hypercholesteremia  -     Lipid panel; Future    History of CEA (carotid endarterectomy)      Will start PCSK9 if LDL still elevated as she is intolerant to statins. High risk for cardiac events given prior history this was explained to her.  We discussed weight loss and balance diet and recommended AHA website on food sources and balance diet.       RTC 10 MO

## 2018-12-03 NOTE — TELEPHONE ENCOUNTER
Spoke to pharmacy.  Clarified prescription      ----- Message from Frantz Cormier sent at 12/3/2018  9:36 AM CST -----  Contact: [facility]   Will like to clarify refill on Rx Revlimid     Contact::[phone]

## 2018-12-04 ENCOUNTER — TELEPHONE (OUTPATIENT)
Dept: HEMATOLOGY/ONCOLOGY | Facility: CLINIC | Age: 68
End: 2018-12-04

## 2018-12-04 NOTE — TELEPHONE ENCOUNTER
Prior auth in process      ----- Message from Frantz Cormier sent at 12/4/2018 12:25 PM CST -----  Contact: [facility]   Will need prior auth for Rx Revlimid 2.5 mg     Contact::[phone]

## 2018-12-05 ENCOUNTER — TELEPHONE (OUTPATIENT)
Dept: HEMATOLOGY/ONCOLOGY | Facility: CLINIC | Age: 68
End: 2018-12-05

## 2018-12-05 NOTE — TELEPHONE ENCOUNTER
Spoke to pharmacy.  Working on prior auth      ----- Message from Jamil Hampton sent at 12/5/2018 11:21 AM CST -----  Contact: Caitlin/[facility] pharmacy  Please call Caitlin at [phone]    Prior authorization is required for     Lenalidomide (REVLIMID) 2.5 mg Cap because of the new quatity    Thank you

## 2018-12-14 ENCOUNTER — TELEPHONE (OUTPATIENT)
Dept: CARDIOLOGY | Facility: CLINIC | Age: 68
End: 2018-12-14

## 2018-12-14 ENCOUNTER — PATIENT MESSAGE (OUTPATIENT)
Dept: HEMATOLOGY/ONCOLOGY | Facility: CLINIC | Age: 68
End: 2018-12-14

## 2018-12-14 ENCOUNTER — TELEPHONE (OUTPATIENT)
Dept: PHARMACY | Facility: CLINIC | Age: 68
End: 2018-12-14

## 2018-12-14 ENCOUNTER — INFUSION (OUTPATIENT)
Dept: INFUSION THERAPY | Facility: HOSPITAL | Age: 68
End: 2018-12-14
Attending: INTERNAL MEDICINE
Payer: MEDICARE

## 2018-12-14 VITALS
RESPIRATION RATE: 18 BRPM | SYSTOLIC BLOOD PRESSURE: 114 MMHG | TEMPERATURE: 99 F | DIASTOLIC BLOOD PRESSURE: 58 MMHG | HEART RATE: 77 BPM

## 2018-12-14 DIAGNOSIS — I65.23 BILATERAL CAROTID ARTERY STENOSIS: ICD-10-CM

## 2018-12-14 DIAGNOSIS — I25.10 CORONARY ARTERY DISEASE, ANGINA PRESENCE UNSPECIFIED, UNSPECIFIED VESSEL OR LESION TYPE, UNSPECIFIED WHETHER NATIVE OR TRANSPLANTED HEART: ICD-10-CM

## 2018-12-14 DIAGNOSIS — D64.9 ANEMIA, UNSPECIFIED TYPE: Primary | ICD-10-CM

## 2018-12-14 DIAGNOSIS — I25.9 CHRONIC ISCHEMIC HEART DISEASE: ICD-10-CM

## 2018-12-14 DIAGNOSIS — D64.9 ANEMIA, UNSPECIFIED TYPE: ICD-10-CM

## 2018-12-14 DIAGNOSIS — E78.00 HYPERCHOLESTEREMIA: Primary | ICD-10-CM

## 2018-12-14 DIAGNOSIS — Z95.820 S/P ANGIOPLASTY WITH STENT: ICD-10-CM

## 2018-12-14 PROCEDURE — P9040 RBC LEUKOREDUCED IRRADIATED: HCPCS

## 2018-12-14 PROCEDURE — 86920 COMPATIBILITY TEST SPIN: CPT

## 2018-12-14 PROCEDURE — 36430 TRANSFUSION BLD/BLD COMPNT: CPT

## 2018-12-14 PROCEDURE — 25000003 PHARM REV CODE 250: Performed by: NURSE PRACTITIONER

## 2018-12-14 RX ORDER — DIPHENHYDRAMINE HCL 25 MG
25 CAPSULE ORAL
Status: COMPLETED | OUTPATIENT
Start: 2018-12-14 | End: 2018-12-14

## 2018-12-14 RX ORDER — ACETAMINOPHEN 325 MG/1
650 TABLET ORAL
Status: CANCELLED | OUTPATIENT
Start: 2018-12-14

## 2018-12-14 RX ORDER — ACETAMINOPHEN 325 MG/1
650 TABLET ORAL
Status: COMPLETED | OUTPATIENT
Start: 2018-12-14 | End: 2018-12-14

## 2018-12-14 RX ORDER — HYDROCODONE BITARTRATE AND ACETAMINOPHEN 500; 5 MG/1; MG/1
TABLET ORAL ONCE
Status: COMPLETED | OUTPATIENT
Start: 2018-12-14 | End: 2018-12-14

## 2018-12-14 RX ORDER — DIPHENHYDRAMINE HCL 25 MG
25 CAPSULE ORAL
Status: CANCELLED | OUTPATIENT
Start: 2018-12-14

## 2018-12-14 RX ORDER — HYDROCODONE BITARTRATE AND ACETAMINOPHEN 500; 5 MG/1; MG/1
TABLET ORAL ONCE
Status: CANCELLED | OUTPATIENT
Start: 2018-12-14 | End: 2018-12-14

## 2018-12-14 RX ADMIN — ACETAMINOPHEN 650 MG: 325 TABLET ORAL at 03:12

## 2018-12-14 RX ADMIN — DIPHENHYDRAMINE HYDROCHLORIDE 25 MG: 25 CAPSULE ORAL at 03:12

## 2018-12-14 RX ADMIN — SODIUM CHLORIDE: 0.9 INJECTION, SOLUTION INTRAVENOUS at 03:12

## 2018-12-14 NOTE — TELEPHONE ENCOUNTER
cholesterol is still high, given your risk factors will start you on a cholesterol lowering injection (as you cannot tolerate statins). This injection is taken once every 2 wks, please get your cholesterol and liver tests 4 wks after starting the medication.

## 2018-12-14 NOTE — TELEPHONE ENCOUNTER
----- Message from Kiki Ervin sent at 12/14/2018  2:15 PM CST -----  Contact: Pt called   Pt returning call.Please call pt @ [phone]. Thank you.

## 2018-12-14 NOTE — TELEPHONE ENCOUNTER
----- Message from Kiki Ervin sent at 12/14/2018  2:15 PM CST -----  Contact: Pt called   Pt returning call.Please call pt @ [phone]. Thank you.

## 2018-12-15 NOTE — PLAN OF CARE
Problem: Adult Inpatient Plan of Care  Goal: Plan of Care Review  Outcome: Ongoing (interventions implemented as appropriate)  Tolerated treatment well.  Advised to call MD for any problems or concerns.  AVS given.  RTC as scheduled. NAD noted upon discharge.

## 2018-12-15 NOTE — PLAN OF CARE
Problem: Anemia  Goal: Anemia Symptom Improvement  Outcome: Ongoing (interventions implemented as appropriate)  Here for blood transfusion.

## 2018-12-17 NOTE — TELEPHONE ENCOUNTER
Documentation Only:    Faxed Prior Authorization form and supporting documentation for Praluent to insurance on 12/17/2018.

## 2018-12-18 NOTE — TELEPHONE ENCOUNTER
Documentation Only:    Prior Authorization for Praluent has been approved for 6 months.    Approval dates:  12/18/2018 through 06/18/2019    Patient co-pay:  $54.04    Researching possible co-pay assistance.

## 2018-12-19 ENCOUNTER — OFFICE VISIT (OUTPATIENT)
Dept: HEMATOLOGY/ONCOLOGY | Facility: CLINIC | Age: 68
End: 2018-12-19
Payer: MEDICARE

## 2018-12-19 VITALS
TEMPERATURE: 97 F | HEIGHT: 65 IN | SYSTOLIC BLOOD PRESSURE: 132 MMHG | BODY MASS INDEX: 32.51 KG/M2 | WEIGHT: 195.13 LBS | OXYGEN SATURATION: 95 % | HEART RATE: 85 BPM | DIASTOLIC BLOOD PRESSURE: 54 MMHG

## 2018-12-19 DIAGNOSIS — D61.818 PANCYTOPENIA: ICD-10-CM

## 2018-12-19 DIAGNOSIS — K52.1 DIARRHEA DUE TO DRUG: ICD-10-CM

## 2018-12-19 DIAGNOSIS — D46.C MDS (MYELODYSPLASTIC SYNDROME) WITH 5Q DELETION: Primary | ICD-10-CM

## 2018-12-19 DIAGNOSIS — K21.9 GASTROESOPHAGEAL REFLUX DISEASE WITHOUT ESOPHAGITIS: ICD-10-CM

## 2018-12-19 DIAGNOSIS — F32.A DEPRESSION, UNSPECIFIED DEPRESSION TYPE: ICD-10-CM

## 2018-12-19 PROCEDURE — 99214 OFFICE O/P EST MOD 30 MIN: CPT | Mod: S$GLB,,, | Performed by: NURSE PRACTITIONER

## 2018-12-19 PROCEDURE — 3078F DIAST BP <80 MM HG: CPT | Mod: CPTII,S$GLB,, | Performed by: NURSE PRACTITIONER

## 2018-12-19 PROCEDURE — 1101F PT FALLS ASSESS-DOCD LE1/YR: CPT | Mod: CPTII,S$GLB,, | Performed by: NURSE PRACTITIONER

## 2018-12-19 PROCEDURE — 99999 PR PBB SHADOW E&M-EST. PATIENT-LVL V: CPT | Mod: PBBFAC,,, | Performed by: NURSE PRACTITIONER

## 2018-12-19 PROCEDURE — 3075F SYST BP GE 130 - 139MM HG: CPT | Mod: CPTII,S$GLB,, | Performed by: NURSE PRACTITIONER

## 2018-12-19 RX ORDER — PANTOPRAZOLE SODIUM 40 MG/1
40 TABLET, DELAYED RELEASE ORAL DAILY
Qty: 30 TABLET | Refills: 1 | Status: SHIPPED | OUTPATIENT
Start: 2018-12-19 | End: 2019-02-21 | Stop reason: SDUPTHER

## 2018-12-19 NOTE — PROGRESS NOTES
Subjective:       Patient ID: Susan Puente is a 68 y.o. female.    Chief Complaint: Follow-up (MDS)    Patient presents today for follow up of her history of MDS 5 q del syndrome.  Revlimid had been stopped since June 2017 after she developed pancytopenia while on Revlimid (required desensitization). CBC was normal for almost 1 year and marrow was negative for del5q. Bone marrow biopsy repeat from 6/2018 showed relapsed 5q minus MDS without new or additional mutations. Revlimid started at 2.5 mg daily with prednisone to prevent allergic reaction. Goal Revlimid 10 mg and increasing dose slowly. She is currently on 7.5 mg daily (has taken 9 doses of this). She remains on Prednisone 10 mg daily since restarting Revlimid. She had been experiencing severe diarrhea since restarting Revlimid, 8-12 episodes daily. Now taking OTC imodium since last month with improvement, now only having diarrhea once a day. She has no allergic reaction this time. No recent fever or infection, has occasional sinus congestion and night sweats. She recently has required PRBC transfusions last week and 3 weeks ago.      Oncology History   Ms. Puente is a 68 year old female with hx of DM2, peripheral vascular disease, tobacco use, CAD, hyperlipidemia, hypertension who was hospitalized 11/10/16 for anemia. hgb 5.3  MCH 43.4 with normal WBC and platelets. Patient had normal iron stores. She was transfused 3 units of PRBCs and discharged home with hgb 8.7 on 11/11/16. She was referred for further evalutation of her anemia. On 12/16/16 patient had a normal SPEP and immunofixation. Slightly elevated kappa light chains with normal ration. Elevated vitamin b12, normal folate, JAYDEN was positive with a low titer and negative profile. Patient had a bone marrow biopsy 1/5/16 which showed the core biopsy is normocellular for age (40%); however, megakaryocytes are increased and show frequent small, hypolobated forms. Additionally, a subset of  the neutrophils are hypogranular. Blasts are not increased by either morphology (1.2%) or in the corresponding flow cytometric analysis. Fish detects a 5q deletion in 54.5% of nuclei. Cytogenetics reported 20 metaphases, 2 metaphases were normal and 18 metaphases had a 5q deletion. No additional cytogenetic abnormalities were detected. Findings consistent with 5q deletion syndrome.     Patient had a delay in obtaining Revlimid 10 mg daily but did start taking the medication 2/8/17. On 2/9/17 patient developed a diffuse maculopapular rash throughout scalp arms, legs and torso. She states she had no stridor or wheezing. She discontinued medication 2/15/17. Went to allergist who provided references on desensitization so that patient could resume Revlimid. Unfortunately developed pancytopenia and Revlimid stopped 6/16/17. She had a repeat BMBX  performed 6/8/17 and showed a hypercellular marrow (60%) continued atypia in the granulocytes and megakaryocytes were noted.  Additionally, there is erythroid atypia present. No increase in blasts. Cytogenetics are normal and MDS FISH is negative, failing to show 5 q minus. NGS should no significant molecular mutations.  Anemia work up revealed bienvenido negative hemolysis.      Review of Systems   Constitutional: Positive for fatigue. Negative for fever.   HENT: Negative for congestion, mouth sores and trouble swallowing.    Respiratory: Negative for cough and shortness of breath.    Cardiovascular: Negative for chest pain and leg swelling.   Gastrointestinal: Positive for diarrhea. Negative for abdominal distention, abdominal pain, nausea and vomiting.        Improved with Imodium   Endocrine: Negative for polyphagia and polyuria.   Genitourinary: Negative for dysuria and urgency.   Musculoskeletal: Positive for arthralgias. Negative for myalgias.   Skin: Negative for color change and pallor.   Neurological: Negative for dizziness, weakness, light-headedness and headaches.    Hematological: Negative for adenopathy. Bruises/bleeds easily.   Psychiatric/Behavioral: Negative for agitation and behavioral problems.       Objective:      Physical Exam   Constitutional: She is oriented to person, place, and time. She appears well-developed and well-nourished.   HENT:   Mouth/Throat: Oropharynx is clear and moist. No oropharyngeal exudate.   Eyes: Conjunctivae are normal. Pupils are equal, round, and reactive to light.   Cardiovascular: Normal rate and regular rhythm.   Pulmonary/Chest: Effort normal and breath sounds normal.   Abdominal: Soft. Bowel sounds are normal.   Musculoskeletal: Normal range of motion.   Lymphadenopathy:     She has no cervical adenopathy.   Neurological: She is alert and oriented to person, place, and time.   Skin: Skin is warm and dry.   Psychiatric: She has a normal mood and affect. Her behavior is normal.       Assessment:       1. MDS (myelodysplastic syndrome) with 5q deletion    2. Gastroesophageal reflux disease without esophagitis    3. Pancytopenia    4. Depression, unspecified depression type    5. Diarrhea due to drug        Plan:   MDS. Initially with 5 q minus syndrome and treated with Revlimid. Developed allergy and was then desensitized. Soon after developed pancytopenia and Revlimid held since June 2017.  BMBX,6/8/17, had normal cytogenetics. Repeat marrow from 6/7/18 shows relapsed 5q minus. Discussed treatment options of HMA therapy or repeat trial of Revlimid and patient wished to proceed with Revlimid   Currently on Revlimid 7.5 mg daily (has taken 9 doses). Will increase Revlimid to goal dose of 10 mg daily  WBC and platelets mildly low but stable, hgb 8.4 gm/dl today. Continue PRN PRBC transfusions  Diarrhea much improved with Imodium.   Will start to wean Prednisone and decrease to 5 mg daily from 10 mg daily. Will start Protonix for steroid induced GERD.   She reports improved mood now on Celexa  Follow-up CBC in 2 weeks  Follow-up visit with  CBC in 4 weeks.    Plan of care discussed with collaborating physician Dr. Gita King NP  Hematology/BMT

## 2018-12-28 ENCOUNTER — PATIENT MESSAGE (OUTPATIENT)
Dept: HEMATOLOGY/ONCOLOGY | Facility: CLINIC | Age: 68
End: 2018-12-28

## 2018-12-28 DIAGNOSIS — D46.C MDS (MYELODYSPLASTIC SYNDROME) WITH 5Q DELETION: Primary | ICD-10-CM

## 2018-12-28 RX ORDER — LENALIDOMIDE 10 MG/1
10 CAPSULE ORAL DAILY
Qty: 28 EACH | Refills: 0 | Status: SHIPPED | OUTPATIENT
Start: 2018-12-28 | End: 2019-01-17 | Stop reason: SDUPTHER

## 2018-12-31 ENCOUNTER — TELEPHONE (OUTPATIENT)
Dept: HEMATOLOGY/ONCOLOGY | Facility: CLINIC | Age: 68
End: 2018-12-31

## 2018-12-31 NOTE — TELEPHONE ENCOUNTER
Spoke to pharmacy and clarified prescription      ----- Message from Devyn Baird sent at 12/31/2018 10:46 AM CST -----  Contact: [phone]  Pharmacy Calling    Reason for call: Clarifying increase in dosage, also change in cycle   Pharmacy Name: [facility]   Prescription Name: lenalidomide (REVLIMID) 10 mg Cap  Phone Number: [phone]  Additional Information:

## 2018-12-31 NOTE — TELEPHONE ENCOUNTER
Spoke to pharmacy.  Prescription given for prednisone      ----- Message from Meme Talley sent at 12/31/2018  1:02 PM CST -----  Contact: Maribel darling/  Pharmacy   Needs prescription refill called in for the pt :     - predniSONE (DELTASONE) 5 MG tablet   or     [facility] - [place] [address] [address] [place] [facility] [address] [phone] (Phone)   Fax : [phone]

## 2019-01-03 ENCOUNTER — INFUSION (OUTPATIENT)
Dept: INFUSION THERAPY | Facility: HOSPITAL | Age: 69
End: 2019-01-03
Attending: INTERNAL MEDICINE
Payer: MEDICARE

## 2019-01-03 ENCOUNTER — CLINICAL SUPPORT (OUTPATIENT)
Dept: CARDIOLOGY | Facility: CLINIC | Age: 69
End: 2019-01-03
Attending: INTERNAL MEDICINE
Payer: MEDICARE

## 2019-01-03 ENCOUNTER — TELEPHONE (OUTPATIENT)
Dept: HEMATOLOGY/ONCOLOGY | Facility: CLINIC | Age: 69
End: 2019-01-03

## 2019-01-03 VITALS
TEMPERATURE: 98 F | RESPIRATION RATE: 16 BRPM | DIASTOLIC BLOOD PRESSURE: 56 MMHG | HEART RATE: 79 BPM | SYSTOLIC BLOOD PRESSURE: 119 MMHG

## 2019-01-03 DIAGNOSIS — I25.10 CORONARY ARTERY DISEASE, ANGINA PRESENCE UNSPECIFIED, UNSPECIFIED VESSEL OR LESION TYPE, UNSPECIFIED WHETHER NATIVE OR TRANSPLANTED HEART: ICD-10-CM

## 2019-01-03 DIAGNOSIS — D46.C MDS (MYELODYSPLASTIC SYNDROME) WITH 5Q DELETION: Primary | ICD-10-CM

## 2019-01-03 DIAGNOSIS — D46.C MDS (MYELODYSPLASTIC SYNDROME) WITH 5Q DELETION: ICD-10-CM

## 2019-01-03 LAB
BLD PROD TYP BPU: NORMAL
BLOOD UNIT EXPIRATION DATE: NORMAL
BLOOD UNIT TYPE CODE: 6200
BLOOD UNIT TYPE: NORMAL
CODING SYSTEM: NORMAL
DISPENSE STATUS: NORMAL
LEFT ARM SYSTOLIC BLOOD PRESSURE: 114 MMHG
LEFT CBA DIAS: 77 CM/S
LEFT CBA SYS: 326 CM/S
LEFT CCA DIST DIAS: 63 CM/S
LEFT CCA DIST SYS: 285 CM/S
LEFT CCA MID DIAS: 51 CM/S
LEFT CCA MID SYS: 234 CM/S
LEFT CCA PROX DIAS: 30 CM/S
LEFT CCA PROX SYS: 156 CM/S
LEFT ECA DIAS: 9 CM/S
LEFT ECA SYS: 147 CM/S
LEFT ICA DIST DIAS: 38 CM/S
LEFT ICA DIST SYS: 123 CM/S
LEFT ICA MID DIAS: 62 CM/S
LEFT ICA MID SYS: 209 CM/S
LEFT ICA PROX DIAS: 52 CM/S
LEFT ICA PROX SYS: 183 CM/S
LEFT VERTEBRAL DIAS: 7 CM/S
LEFT VERTEBRAL SYS: 60 CM/S
NUM UNITS TRANS PACKED RBC: NORMAL
OHS CV CAROTID RIGHT ICA EDV HIGHEST: 40
OHS CV CAROTID ULTRASOUND LEFT ICA/CCA RATIO: 0.73
OHS CV CAROTID ULTRASOUND RIGHT ICA/CCA RATIO: 1.35
OHS CV PV CAROTID LEFT HIGHEST CCA: 285
OHS CV PV CAROTID LEFT HIGHEST ICA: 209
OHS CV PV CAROTID RIGHT HIGHEST CCA: 169
OHS CV PV CAROTID RIGHT HIGHEST ICA: 228
OHS CV US CAROTID LEFT HIGHEST EDV: 62
RIGHT ARM SYSTOLIC BLOOD PRESSURE: 110 MMHG
RIGHT CBA DIAS: 24 CM/S
RIGHT CBA SYS: 138 CM/S
RIGHT CCA DIST DIAS: 33 CM/S
RIGHT CCA DIST SYS: 169 CM/S
RIGHT CCA MID DIAS: 26 CM/S
RIGHT CCA MID SYS: 141 CM/S
RIGHT CCA PROX DIAS: 22 CM/S
RIGHT CCA PROX SYS: 169 CM/S
RIGHT ECA DIAS: 15 CM/S
RIGHT ECA SYS: 278 CM/S
RIGHT ICA DIST DIAS: 28 CM/S
RIGHT ICA DIST SYS: 118 CM/S
RIGHT ICA MID DIAS: 38 CM/S
RIGHT ICA MID SYS: 158 CM/S
RIGHT ICA PROX DIAS: 40 CM/S
RIGHT ICA PROX SYS: 228 CM/S
RIGHT VERTEBRAL DIAS: 19 CM/S
RIGHT VERTEBRAL SYS: 111 CM/S

## 2019-01-03 PROCEDURE — 93880 EXTRACRANIAL BILAT STUDY: CPT | Mod: S$GLB,,, | Performed by: INTERNAL MEDICINE

## 2019-01-03 PROCEDURE — 93880 CV US DOPPLER CAROTID (CUPID ONLY): ICD-10-PCS | Mod: S$GLB,,, | Performed by: INTERNAL MEDICINE

## 2019-01-03 PROCEDURE — 86920 COMPATIBILITY TEST SPIN: CPT

## 2019-01-03 PROCEDURE — 25000003 PHARM REV CODE 250: Performed by: INTERNAL MEDICINE

## 2019-01-03 PROCEDURE — 27201040 HC RC 50 FILTER

## 2019-01-03 PROCEDURE — P9038 RBC IRRADIATED: HCPCS

## 2019-01-03 PROCEDURE — 36430 TRANSFUSION BLD/BLD COMPNT: CPT

## 2019-01-03 RX ORDER — ACETAMINOPHEN 325 MG/1
650 TABLET ORAL
Status: CANCELLED | OUTPATIENT
Start: 2019-01-03

## 2019-01-03 RX ORDER — HYDROCODONE BITARTRATE AND ACETAMINOPHEN 500; 5 MG/1; MG/1
TABLET ORAL ONCE
Status: COMPLETED | OUTPATIENT
Start: 2019-01-03 | End: 2019-01-03

## 2019-01-03 RX ORDER — ACETAMINOPHEN 325 MG/1
650 TABLET ORAL
Status: COMPLETED | OUTPATIENT
Start: 2019-01-03 | End: 2019-01-03

## 2019-01-03 RX ORDER — DIPHENHYDRAMINE HCL 25 MG
25 CAPSULE ORAL
Status: CANCELLED | OUTPATIENT
Start: 2019-01-03

## 2019-01-03 RX ORDER — HYDROCODONE BITARTRATE AND ACETAMINOPHEN 500; 5 MG/1; MG/1
TABLET ORAL ONCE
Status: CANCELLED | OUTPATIENT
Start: 2019-01-03 | End: 2019-01-03

## 2019-01-03 RX ORDER — DIPHENHYDRAMINE HCL 25 MG
25 CAPSULE ORAL
Status: COMPLETED | OUTPATIENT
Start: 2019-01-03 | End: 2019-01-03

## 2019-01-03 RX ADMIN — SODIUM CHLORIDE: 0.9 INJECTION, SOLUTION INTRAVENOUS at 02:01

## 2019-01-03 RX ADMIN — DIPHENHYDRAMINE HYDROCHLORIDE 25 MG: 25 CAPSULE ORAL at 02:01

## 2019-01-03 RX ADMIN — ACETAMINOPHEN 650 MG: 325 TABLET ORAL at 02:01

## 2019-01-03 NOTE — PLAN OF CARE
Problem: Adult Inpatient Plan of Care  Goal: Plan of Care Review  Outcome: Ongoing (interventions implemented as appropriate)  1650:  Patient tolerated 1 unit PRBC's well, VSS, NAD noted, denies any changes.  PIV removed intact, patient advised to contact MD with any s/s of transfusion reaction, understanding verbalized.  AVS given.  Patient released in stable condition, via wheelchair, accompanied by a friend.

## 2019-01-04 ENCOUNTER — TELEPHONE (OUTPATIENT)
Dept: CARDIOLOGY | Facility: CLINIC | Age: 69
End: 2019-01-04

## 2019-01-09 ENCOUNTER — PATIENT MESSAGE (OUTPATIENT)
Dept: HEMATOLOGY/ONCOLOGY | Facility: CLINIC | Age: 69
End: 2019-01-09

## 2019-01-10 ENCOUNTER — PATIENT MESSAGE (OUTPATIENT)
Dept: HEMATOLOGY/ONCOLOGY | Facility: CLINIC | Age: 69
End: 2019-01-10

## 2019-01-10 ENCOUNTER — LAB VISIT (OUTPATIENT)
Dept: LAB | Facility: HOSPITAL | Age: 69
End: 2019-01-10
Attending: INTERNAL MEDICINE
Payer: MEDICARE

## 2019-01-10 ENCOUNTER — TELEPHONE (OUTPATIENT)
Dept: HEMATOLOGY/ONCOLOGY | Facility: CLINIC | Age: 69
End: 2019-01-10

## 2019-01-10 DIAGNOSIS — D46.C MDS (MYELODYSPLASTIC SYNDROME) WITH 5Q DELETION: Primary | ICD-10-CM

## 2019-01-10 DIAGNOSIS — D46.9 MDS (MYELODYSPLASTIC SYNDROME): ICD-10-CM

## 2019-01-10 DIAGNOSIS — D53.9 MACROCYTIC ANEMIA: ICD-10-CM

## 2019-01-10 DIAGNOSIS — D46.C MDS (MYELODYSPLASTIC SYNDROME) WITH 5Q DELETION: ICD-10-CM

## 2019-01-10 LAB
ABO + RH BLD: NORMAL
BASOPHILS # BLD AUTO: 0.05 K/UL
BASOPHILS NFR BLD: 1.7 %
BLD GP AB SCN CELLS X3 SERPL QL: NORMAL
DIFFERENTIAL METHOD: ABNORMAL
EOSINOPHIL # BLD AUTO: 0.2 K/UL
EOSINOPHIL NFR BLD: 8.1 %
ERYTHROCYTE [DISTWIDTH] IN BLOOD BY AUTOMATED COUNT: 27 %
HCT VFR BLD AUTO: 21.1 %
HGB BLD-MCNC: 6.9 G/DL
IMM GRANULOCYTES # BLD AUTO: 0.04 K/UL
IMM GRANULOCYTES NFR BLD AUTO: 1.3 %
LYMPHOCYTES # BLD AUTO: 0.9 K/UL
LYMPHOCYTES NFR BLD: 29.9 %
MCH RBC QN AUTO: 36.5 PG
MCHC RBC AUTO-ENTMCNC: 32.7 G/DL
MCV RBC AUTO: 112 FL
MONOCYTES # BLD AUTO: 0.3 K/UL
MONOCYTES NFR BLD: 9.1 %
NEUTROPHILS # BLD AUTO: 1.5 K/UL
NEUTROPHILS NFR BLD: 49.9 %
NRBC BLD-RTO: 0 /100 WBC
PLATELET # BLD AUTO: 127 K/UL
PMV BLD AUTO: 13.6 FL
RBC # BLD AUTO: 1.89 M/UL
WBC # BLD AUTO: 2.98 K/UL

## 2019-01-10 PROCEDURE — 85025 COMPLETE CBC W/AUTO DIFF WBC: CPT

## 2019-01-10 PROCEDURE — 86850 RBC ANTIBODY SCREEN: CPT

## 2019-01-10 RX ORDER — ACETAMINOPHEN 325 MG/1
650 TABLET ORAL
Status: CANCELLED | OUTPATIENT
Start: 2019-01-10

## 2019-01-10 RX ORDER — DIPHENHYDRAMINE HCL 25 MG
25 CAPSULE ORAL
Status: CANCELLED | OUTPATIENT
Start: 2019-01-10

## 2019-01-10 RX ORDER — HYDROCODONE BITARTRATE AND ACETAMINOPHEN 500; 5 MG/1; MG/1
TABLET ORAL ONCE
Status: CANCELLED | OUTPATIENT
Start: 2019-01-10 | End: 2019-01-10

## 2019-01-11 ENCOUNTER — INFUSION (OUTPATIENT)
Dept: INFUSION THERAPY | Facility: HOSPITAL | Age: 69
End: 2019-01-11
Attending: INTERNAL MEDICINE
Payer: MEDICARE

## 2019-01-11 VITALS
SYSTOLIC BLOOD PRESSURE: 137 MMHG | TEMPERATURE: 98 F | DIASTOLIC BLOOD PRESSURE: 63 MMHG | HEART RATE: 65 BPM | RESPIRATION RATE: 16 BRPM

## 2019-01-11 DIAGNOSIS — D46.C MDS (MYELODYSPLASTIC SYNDROME) WITH 5Q DELETION: ICD-10-CM

## 2019-01-11 DIAGNOSIS — D53.9 MACROCYTIC ANEMIA: ICD-10-CM

## 2019-01-11 PROCEDURE — 25000003 PHARM REV CODE 250: Performed by: INTERNAL MEDICINE

## 2019-01-11 PROCEDURE — P9038 RBC IRRADIATED: HCPCS

## 2019-01-11 PROCEDURE — 27201040 HC RC 50 FILTER

## 2019-01-11 PROCEDURE — 36430 TRANSFUSION BLD/BLD COMPNT: CPT

## 2019-01-11 PROCEDURE — 86920 COMPATIBILITY TEST SPIN: CPT

## 2019-01-11 RX ORDER — HYDROCODONE BITARTRATE AND ACETAMINOPHEN 500; 5 MG/1; MG/1
TABLET ORAL ONCE
Status: DISCONTINUED | OUTPATIENT
Start: 2019-01-11 | End: 2019-01-11 | Stop reason: HOSPADM

## 2019-01-11 RX ORDER — DIPHENHYDRAMINE HCL 25 MG
25 CAPSULE ORAL
Status: COMPLETED | OUTPATIENT
Start: 2019-01-11 | End: 2019-01-11

## 2019-01-11 RX ORDER — ACETAMINOPHEN 325 MG/1
650 TABLET ORAL
Status: COMPLETED | OUTPATIENT
Start: 2019-01-11 | End: 2019-01-11

## 2019-01-11 RX ADMIN — ACETAMINOPHEN 650 MG: 325 TABLET ORAL at 08:01

## 2019-01-11 RX ADMIN — DIPHENHYDRAMINE HYDROCHLORIDE 25 MG: 25 CAPSULE ORAL at 08:01

## 2019-01-11 NOTE — PLAN OF CARE
Problem: Anemia  Goal: Anemia Symptom Improvement  Outcome: Ongoing (interventions implemented as appropriate)  Patient here for 1u PRBC.  Assessment completed and labs reviewed.  VSS.  No needs at this time.  Chair reclined and blanket offered. Will continue to monitor.

## 2019-01-11 NOTE — PLAN OF CARE
Problem: Adult Inpatient Plan of Care  Goal: Plan of Care Review  Outcome: Ongoing (interventions implemented as appropriate)  Tolerated transfusion well.  No reactions noted.  No questions or concerns at this time.  Ambulated off unit unassisted.

## 2019-01-14 ENCOUNTER — TELEPHONE (OUTPATIENT)
Dept: HEMATOLOGY/ONCOLOGY | Facility: CLINIC | Age: 69
End: 2019-01-14

## 2019-01-14 NOTE — TELEPHONE ENCOUNTER
Spoke to [facility].  They state they are still processing paperwork for patient for assistance.      ----- Message from Barbara Chadwick sent at 1/14/2019 11:28 AM CST -----  Contact: urmila from [facility]  urmila from [facility] called to speak with nurse Stephanie neff [employer]   Callback#[phone]  Thank You  ROD Chadwick

## 2019-01-17 ENCOUNTER — PATIENT MESSAGE (OUTPATIENT)
Dept: HEMATOLOGY/ONCOLOGY | Facility: CLINIC | Age: 69
End: 2019-01-17

## 2019-01-17 DIAGNOSIS — D46.C MDS (MYELODYSPLASTIC SYNDROME) WITH 5Q DELETION: ICD-10-CM

## 2019-01-17 RX ORDER — LENALIDOMIDE 10 MG/1
10 CAPSULE ORAL DAILY
Qty: 28 EACH | Refills: 0 | Status: SHIPPED | OUTPATIENT
Start: 2019-01-17 | End: 2019-01-25 | Stop reason: SDUPTHER

## 2019-01-17 NOTE — TELEPHONE ENCOUNTER
Prescription sent to requested pharmacy      ----- Message from Barbara Chadwick sent at 1/17/2019 12:32 PM CST -----  Contact: sanchez bradford from [employer] called to speak with nurse barak about pt being approved for the free medication program for revlimid and the pharmacy to send the RX to is [facility]  [phone]  Callback#[phone] ext [phone]  Thank You  ROD Chadwick

## 2019-01-17 NOTE — TELEPHONE ENCOUNTER
----- Message from Barbara Chadwick sent at 1/17/2019 12:32 PM CST -----  Contact: sanchez bradford from [employer] called to speak with nurse barak about pt being approved for the free medication program for revlimid and the pharmacy to send the RX to is [facility]  [phone]  Callback#[phone] ext [phone]  Thank You  ROD Chadwick

## 2019-01-18 ENCOUNTER — TELEPHONE (OUTPATIENT)
Dept: HEMATOLOGY/ONCOLOGY | Facility: CLINIC | Age: 69
End: 2019-01-18

## 2019-01-18 NOTE — TELEPHONE ENCOUNTER
Clarified prescription    ----- Message from Meme Talley sent at 1/18/2019 10:20 AM CST -----  Contact: [facility]   Questions about the pt's   lenalidomide (REVLIMID) 10 mg Cap   The authorization number is invalid and they will need a new one.     Please contact via [phone]

## 2019-01-25 DIAGNOSIS — D46.C MDS (MYELODYSPLASTIC SYNDROME) WITH 5Q DELETION: ICD-10-CM

## 2019-01-25 RX ORDER — LENALIDOMIDE 10 MG/1
10 CAPSULE ORAL DAILY
Qty: 28 EACH | Refills: 0 | Status: SHIPPED | OUTPATIENT
Start: 2019-01-25 | End: 2019-01-28 | Stop reason: SDUPTHER

## 2019-01-28 ENCOUNTER — PATIENT MESSAGE (OUTPATIENT)
Dept: HEMATOLOGY/ONCOLOGY | Facility: CLINIC | Age: 69
End: 2019-01-28

## 2019-01-28 DIAGNOSIS — D46.C MDS (MYELODYSPLASTIC SYNDROME) WITH 5Q DELETION: ICD-10-CM

## 2019-01-28 RX ORDER — LENALIDOMIDE 10 MG/1
10 CAPSULE ORAL DAILY
Qty: 28 EACH | Refills: 0 | Status: SHIPPED | OUTPATIENT
Start: 2019-01-28 | End: 2019-02-20 | Stop reason: SDUPTHER

## 2019-01-28 NOTE — PROGRESS NOTES
Subjective:       Patient ID: Susan Puente is a 68 y.o. female.    Chief Complaint: Follow-up    Patient presents today for follow up of her history of MDS 5 q del syndrome.  Revlimid had been stopped since June 2017 after she developed pancytopenia while on Revlimid (required desensitization). CBC was normal for almost 1 year and marrow was negative for del5q. Bone marrow biopsy repeat from 6/2018 showed relapsed 5q minus MDS without new or additional mutations. Revlimid started at 2.5 mg daily with prednisone to prevent allergic reaction. Goal Revlimid 10 mg and increasing dose slowly.     She is currently on 10 mg daily since 12/19/18. She remains on Prednisone 5 mg daily (weaned from 10mg daily on 12/19/18). She has been on prednisone since restarting Revlimid due to allergy. She is no longer having any reactions to revlimid. She denies rash, fevers, chills, diarrhea, nausea, vomiting, constipation, chest pain, SOB.  No recent fever or infection, does report sinus congestion and sore throat but denies fever. She also reports watery eyes. She recently has required PRBC transfusions about twice per month. She will receive 1 U PRBCs today for Hgb 6.6.      Oncology History   Ms. Puente is a 68 year old female with hx of DM2, peripheral vascular disease, tobacco use, CAD, hyperlipidemia, hypertension who was hospitalized 11/10/16 for anemia. hgb 5.3  MCH 43.4 with normal WBC and platelets. Patient had normal iron stores. She was transfused 3 units of PRBCs and discharged home with hgb 8.7 on 11/11/16. She was referred for further evalutation of her anemia. On 12/16/16 patient had a normal SPEP and immunofixation. Slightly elevated kappa light chains with normal ration. Elevated vitamin b12, normal folate, JAYDEN was positive with a low titer and negative profile. Patient had a bone marrow biopsy 1/5/16 which showed the core biopsy is normocellular for age (40%); however, megakaryocytes are increased  and show frequent small, hypolobated forms. Additionally, a subset of the neutrophils are hypogranular. Blasts are not increased by either morphology (1.2%) or in the corresponding flow cytometric analysis. Fish detects a 5q deletion in 54.5% of nuclei. Cytogenetics reported 20 metaphases, 2 metaphases were normal and 18 metaphases had a 5q deletion. No additional cytogenetic abnormalities were detected. Findings consistent with 5q deletion syndrome.     Patient had a delay in obtaining Revlimid 10 mg daily but did start taking the medication 2/8/17. On 2/9/17 patient developed a diffuse maculopapular rash throughout scalp arms, legs and torso. She states she had no stridor or wheezing. She discontinued medication 2/15/17. Went to allergist who provided references on desensitization so that patient could resume Revlimid. Unfortunately developed pancytopenia and Revlimid stopped 6/16/17. She had a repeat BMBX  performed 6/8/17 and showed a hypercellular marrow (60%) continued atypia in the granulocytes and megakaryocytes were noted.  Additionally, there is erythroid atypia present. No increase in blasts. Cytogenetics are normal and MDS FISH is negative, failing to show 5 q minus. NGS should no significant molecular mutations.  Anemia work up revealed bienvenido negative hemolysis.      Review of Systems   Constitutional: Positive for fatigue. Negative for fever.   HENT: Positive for congestion, rhinorrhea and sore throat. Negative for ear pain, mouth sores, nosebleeds and trouble swallowing.    Eyes:        Watery eyes   Respiratory: Negative for cough, shortness of breath and wheezing.    Cardiovascular: Negative for chest pain and leg swelling.   Gastrointestinal: Negative for abdominal distention, abdominal pain, blood in stool, constipation, diarrhea, nausea and vomiting.        Improved with Imodium   Endocrine: Negative for polyphagia and polyuria.   Genitourinary: Negative for dysuria, hematuria and urgency.    Musculoskeletal: Positive for arthralgias. Negative for myalgias.   Skin: Negative for color change, pallor and rash.   Neurological: Positive for numbness (to balls of feet; likely secondary to DM2). Negative for dizziness, weakness, light-headedness and headaches.   Hematological: Negative for adenopathy. Does not bruise/bleed easily.   Psychiatric/Behavioral: Negative for agitation and behavioral problems.       Objective:      Physical Exam   Constitutional: She is oriented to person, place, and time. She appears well-developed and well-nourished.   HENT:   Head: Normocephalic and atraumatic.   Right Ear: External ear normal.   Left Ear: External ear normal.   Mouth/Throat: Oropharynx is clear and moist. No oropharyngeal exudate.   Eyes: Conjunctivae and EOM are normal. Pupils are equal, round, and reactive to light. Right eye exhibits no discharge. Left eye exhibits no discharge.   Neck: Normal range of motion. Neck supple.   Cardiovascular: Normal rate, regular rhythm, normal heart sounds and intact distal pulses.   No murmur heard.  Pulmonary/Chest: Effort normal and breath sounds normal. No respiratory distress. She has no wheezes.   Abdominal: Soft. Bowel sounds are normal. She exhibits no distension and no mass. There is no tenderness.   Musculoskeletal: Normal range of motion. She exhibits no edema.   Lymphadenopathy:     She has no cervical adenopathy.   Neurological: She is alert and oriented to person, place, and time.   Skin: Skin is warm and dry. No rash noted.   Psychiatric: She has a normal mood and affect. Her behavior is normal. Judgment and thought content normal.   Nursing note and vitals reviewed.      Assessment:       1. MDS (myelodysplastic syndrome) with 5q deletion    2. Pancytopenia    3. Encounter for chemotherapy management    4. Stage 3 chronic kidney disease    5. Controlled type 2 diabetes mellitus with stage 3 chronic kidney disease, without long-term current use of insulin    6.  Essential hypertension    7. Bilateral carotid artery disease, unspecified type    8. Adjustment disorder with mixed anxiety and depressed mood        Plan:     MDS/Pancytopenia  Initially with 5 q minus syndrome and treated with Revlimid. Developed allergy and was then desensitized. Soon after developed pancytopenia and Revlimid held since June 2017.  BMBX,6/8/17, had normal cytogenetics. Repeat marrow from 6/7/18 shows relapsed 5q minus. Discussed treatment options of HMA therapy or repeat trial of Revlimid and patient wished to proceed with Revlimid   Currently on Revlimid 10 mg daily since 12/19/18.  Worsening pancytopenia noted today on CBC- WBC 2.54, Hgb 6.6, Plt 97. Discussed with Dr. Valdes. Continue revlimid 10mg QD and recheck labs in 2 weeks.  1 U PRBCs today for Hgb 6.6. Pt requiring PRN PRBCs ~ twice monthly.  Started Prednisone wean 12/19/18 and decreased to 5 mg daily (from 10 mg daily). Continue 5mg QD prednisone. Will continue Protonix for steroid induced GERD.   No indication for Plt transfusion; stop ASA if Platelets<50k    Stage 3 CKD  - stable; Cr 1.5; continue to monitor with visits    DM2  - management per PCP; BG WNL today  - continue metformin; educated to perform daily foot checks with neuropathy to feet    HTN  - management per PCP  - continue lisinopril-HCTZ    CAD  - attempting to get praluent for HLD per PCP; intolerant to statins; on ASA    Anxiety/Depression  She reports improved mood now on Celexa. Continue.     F/U  Follow-up CBC, T&S in 2 weeks and chair for possible blood.  Follow-up visit with CBC, T&S, CMP, Mg, Phos in 4 weeks & chair for possible blood.    Plan of care discussed with collaborating physician Dr. Gita Valdes        Distress Screening Results: Psychosocial Distress screening score of Distress Score: 0 noted and reviewed. No intervention indicated.

## 2019-02-01 ENCOUNTER — PATIENT MESSAGE (OUTPATIENT)
Dept: HEMATOLOGY/ONCOLOGY | Facility: CLINIC | Age: 69
End: 2019-02-01

## 2019-02-01 ENCOUNTER — OFFICE VISIT (OUTPATIENT)
Dept: HEMATOLOGY/ONCOLOGY | Facility: CLINIC | Age: 69
End: 2019-02-01
Payer: MEDICARE

## 2019-02-01 ENCOUNTER — LAB VISIT (OUTPATIENT)
Dept: LAB | Facility: HOSPITAL | Age: 69
End: 2019-02-01
Attending: INTERNAL MEDICINE
Payer: MEDICARE

## 2019-02-01 ENCOUNTER — INFUSION (OUTPATIENT)
Dept: INFUSION THERAPY | Facility: HOSPITAL | Age: 69
End: 2019-02-01
Attending: INTERNAL MEDICINE
Payer: MEDICARE

## 2019-02-01 VITALS
TEMPERATURE: 97 F | SYSTOLIC BLOOD PRESSURE: 107 MMHG | RESPIRATION RATE: 20 BRPM | HEIGHT: 66 IN | HEART RATE: 86 BPM | OXYGEN SATURATION: 95 % | WEIGHT: 193.56 LBS | BODY MASS INDEX: 31.11 KG/M2 | DIASTOLIC BLOOD PRESSURE: 54 MMHG

## 2019-02-01 VITALS
TEMPERATURE: 98 F | HEART RATE: 71 BPM | RESPIRATION RATE: 18 BRPM | DIASTOLIC BLOOD PRESSURE: 52 MMHG | SYSTOLIC BLOOD PRESSURE: 109 MMHG

## 2019-02-01 DIAGNOSIS — D46.9 MDS (MYELODYSPLASTIC SYNDROME): ICD-10-CM

## 2019-02-01 DIAGNOSIS — I10 ESSENTIAL HYPERTENSION: ICD-10-CM

## 2019-02-01 DIAGNOSIS — D46.C MDS (MYELODYSPLASTIC SYNDROME) WITH 5Q DELETION: ICD-10-CM

## 2019-02-01 DIAGNOSIS — F43.23 ADJUSTMENT DISORDER WITH MIXED ANXIETY AND DEPRESSED MOOD: ICD-10-CM

## 2019-02-01 DIAGNOSIS — N18.30 STAGE 3 CHRONIC KIDNEY DISEASE: ICD-10-CM

## 2019-02-01 DIAGNOSIS — I77.9 BILATERAL CAROTID ARTERY DISEASE, UNSPECIFIED TYPE: ICD-10-CM

## 2019-02-01 DIAGNOSIS — Z51.11 ENCOUNTER FOR CHEMOTHERAPY MANAGEMENT: ICD-10-CM

## 2019-02-01 DIAGNOSIS — N18.30 CONTROLLED TYPE 2 DIABETES MELLITUS WITH STAGE 3 CHRONIC KIDNEY DISEASE, WITHOUT LONG-TERM CURRENT USE OF INSULIN: ICD-10-CM

## 2019-02-01 DIAGNOSIS — D61.818 PANCYTOPENIA: ICD-10-CM

## 2019-02-01 DIAGNOSIS — E11.22 CONTROLLED TYPE 2 DIABETES MELLITUS WITH STAGE 3 CHRONIC KIDNEY DISEASE, WITHOUT LONG-TERM CURRENT USE OF INSULIN: ICD-10-CM

## 2019-02-01 DIAGNOSIS — D46.C MDS (MYELODYSPLASTIC SYNDROME) WITH 5Q DELETION: Primary | ICD-10-CM

## 2019-02-01 LAB
ABO + RH BLD: NORMAL
ALBUMIN SERPL BCP-MCNC: 3.5 G/DL
ALP SERPL-CCNC: 62 U/L
ALT SERPL W/O P-5'-P-CCNC: 27 U/L
ANION GAP SERPL CALC-SCNC: 12 MMOL/L
AST SERPL-CCNC: 15 U/L
BASOPHILS # BLD AUTO: 0.08 K/UL
BASOPHILS NFR BLD: 3.1 %
BILIRUB SERPL-MCNC: 0.6 MG/DL
BLD GP AB SCN CELLS X3 SERPL QL: NORMAL
BLD PROD TYP BPU: NORMAL
BLOOD UNIT EXPIRATION DATE: NORMAL
BLOOD UNIT TYPE CODE: 6200
BLOOD UNIT TYPE: NORMAL
BUN SERPL-MCNC: 38 MG/DL
CALCIUM SERPL-MCNC: 9.5 MG/DL
CHLORIDE SERPL-SCNC: 104 MMOL/L
CO2 SERPL-SCNC: 23 MMOL/L
CODING SYSTEM: NORMAL
CREAT SERPL-MCNC: 1.5 MG/DL
DIFFERENTIAL METHOD: ABNORMAL
DISPENSE STATUS: NORMAL
EOSINOPHIL # BLD AUTO: 0.2 K/UL
EOSINOPHIL NFR BLD: 9.4 %
ERYTHROCYTE [DISTWIDTH] IN BLOOD BY AUTOMATED COUNT: 28.4 %
EST. GFR  (AFRICAN AMERICAN): 41 ML/MIN/1.73 M^2
EST. GFR  (NON AFRICAN AMERICAN): 35.5 ML/MIN/1.73 M^2
GLUCOSE SERPL-MCNC: 111 MG/DL
HCT VFR BLD AUTO: 20.2 %
HGB BLD-MCNC: 6.6 G/DL
IMM GRANULOCYTES # BLD AUTO: 0.03 K/UL
IMM GRANULOCYTES NFR BLD AUTO: 1.2 %
LYMPHOCYTES # BLD AUTO: 1.1 K/UL
LYMPHOCYTES NFR BLD: 43.7 %
MCH RBC QN AUTO: 37.9 PG
MCHC RBC AUTO-ENTMCNC: 32.7 G/DL
MCV RBC AUTO: 116 FL
MONOCYTES # BLD AUTO: 0.2 K/UL
MONOCYTES NFR BLD: 7.1 %
NEUTROPHILS # BLD AUTO: 0.9 K/UL
NEUTROPHILS NFR BLD: 35.5 %
NRBC BLD-RTO: 2 /100 WBC
NUM UNITS TRANS PACKED RBC: NORMAL
PLATELET # BLD AUTO: 97 K/UL
PMV BLD AUTO: 14.4 FL
POTASSIUM SERPL-SCNC: 4.1 MMOL/L
PROT SERPL-MCNC: 7 G/DL
RBC # BLD AUTO: 1.74 M/UL
SODIUM SERPL-SCNC: 139 MMOL/L
WBC # BLD AUTO: 2.54 K/UL

## 2019-02-01 PROCEDURE — 36430 TRANSFUSION BLD/BLD COMPNT: CPT

## 2019-02-01 PROCEDURE — 99215 OFFICE O/P EST HI 40 MIN: CPT | Mod: S$GLB,,, | Performed by: NURSE PRACTITIONER

## 2019-02-01 PROCEDURE — P9040 RBC LEUKOREDUCED IRRADIATED: HCPCS

## 2019-02-01 PROCEDURE — 86850 RBC ANTIBODY SCREEN: CPT

## 2019-02-01 PROCEDURE — 85025 COMPLETE CBC W/AUTO DIFF WBC: CPT

## 2019-02-01 PROCEDURE — 86920 COMPATIBILITY TEST SPIN: CPT

## 2019-02-01 PROCEDURE — 3074F SYST BP LT 130 MM HG: CPT | Mod: CPTII,S$GLB,, | Performed by: NURSE PRACTITIONER

## 2019-02-01 PROCEDURE — 3078F PR MOST RECENT DIASTOLIC BLOOD PRESSURE < 80 MM HG: ICD-10-PCS | Mod: CPTII,S$GLB,, | Performed by: NURSE PRACTITIONER

## 2019-02-01 PROCEDURE — 1101F PT FALLS ASSESS-DOCD LE1/YR: CPT | Mod: CPTII,S$GLB,, | Performed by: NURSE PRACTITIONER

## 2019-02-01 PROCEDURE — 99999 PR PBB SHADOW E&M-EST. PATIENT-LVL V: CPT | Mod: PBBFAC,,, | Performed by: NURSE PRACTITIONER

## 2019-02-01 PROCEDURE — 99999 PR PBB SHADOW E&M-EST. PATIENT-LVL V: ICD-10-PCS | Mod: PBBFAC,,, | Performed by: NURSE PRACTITIONER

## 2019-02-01 PROCEDURE — 1101F PR PT FALLS ASSESS DOC 0-1 FALLS W/OUT INJ PAST YR: ICD-10-PCS | Mod: CPTII,S$GLB,, | Performed by: NURSE PRACTITIONER

## 2019-02-01 PROCEDURE — 99215 PR OFFICE/OUTPT VISIT, EST, LEVL V, 40-54 MIN: ICD-10-PCS | Mod: S$GLB,,, | Performed by: NURSE PRACTITIONER

## 2019-02-01 PROCEDURE — 3078F DIAST BP <80 MM HG: CPT | Mod: CPTII,S$GLB,, | Performed by: NURSE PRACTITIONER

## 2019-02-01 PROCEDURE — 36415 COLL VENOUS BLD VENIPUNCTURE: CPT

## 2019-02-01 PROCEDURE — 3074F PR MOST RECENT SYSTOLIC BLOOD PRESSURE < 130 MM HG: ICD-10-PCS | Mod: CPTII,S$GLB,, | Performed by: NURSE PRACTITIONER

## 2019-02-01 PROCEDURE — 80053 COMPREHEN METABOLIC PANEL: CPT

## 2019-02-01 PROCEDURE — 25000003 PHARM REV CODE 250: Performed by: NURSE PRACTITIONER

## 2019-02-01 RX ORDER — ACETAMINOPHEN 325 MG/1
650 TABLET ORAL
Status: CANCELLED | OUTPATIENT
Start: 2019-02-01

## 2019-02-01 RX ORDER — ACETAMINOPHEN 325 MG/1
650 TABLET ORAL
Status: COMPLETED | OUTPATIENT
Start: 2019-02-01 | End: 2019-02-01

## 2019-02-01 RX ORDER — HYDROCODONE BITARTRATE AND ACETAMINOPHEN 500; 5 MG/1; MG/1
TABLET ORAL ONCE
Status: CANCELLED | OUTPATIENT
Start: 2019-02-01 | End: 2019-02-01

## 2019-02-01 RX ORDER — HYDROCODONE BITARTRATE AND ACETAMINOPHEN 500; 5 MG/1; MG/1
TABLET ORAL ONCE
Status: DISCONTINUED | OUTPATIENT
Start: 2019-02-01 | End: 2019-02-01 | Stop reason: HOSPADM

## 2019-02-01 RX ORDER — DIPHENHYDRAMINE HCL 25 MG
25 CAPSULE ORAL
Status: CANCELLED | OUTPATIENT
Start: 2019-02-01

## 2019-02-01 RX ORDER — DIPHENHYDRAMINE HCL 25 MG
25 CAPSULE ORAL
Status: COMPLETED | OUTPATIENT
Start: 2019-02-01 | End: 2019-02-01

## 2019-02-01 RX ADMIN — DIPHENHYDRAMINE HYDROCHLORIDE 25 MG: 25 CAPSULE ORAL at 03:02

## 2019-02-01 RX ADMIN — ACETAMINOPHEN 650 MG: 325 TABLET ORAL at 03:02

## 2019-02-01 NOTE — PLAN OF CARE
Problem: Adult Inpatient Plan of Care  Goal: Patient-Specific Goal (Individualization)  Outcome: Ongoing (interventions implemented as appropriate)  Patient's labs, history, allergies, and medication reviewed.  Assessment complete.  Vital signs stable.  Patient to receive 1 u PRBC.  Discussed plan of care with patient.  Patient in agreement. Chair reclined.  Warm blanket and snack offered.  Will monitor.

## 2019-02-01 NOTE — Clinical Note
Follow-up CBC, T&S in 2 weeks and chair for possible blood.Follow-up visit with CBC, T&S, CMP, Mg, Phos in 4 weeks with Dr. Valdes and chair for possible blood.

## 2019-02-02 NOTE — PLAN OF CARE
Problem: Adult Inpatient Plan of Care  Goal: Plan of Care Review  Outcome: Ongoing (interventions implemented as appropriate)  Patient tolerated 1 unit of RBC without complication.  Vital signs stable.  No apparent distress noted.  Discharged patient without s/s of adverse event.  AVS/Outpatient Blood Transfusion handout given.  Patient instructed of s/s of transfusion reaction and when  to notify the provider.  Patient verbalized understanding.

## 2019-02-12 NOTE — TELEPHONE ENCOUNTER
FOR DOCUMENTATION ONLY:  Financial Assistance for Praluent approved from 2/11/19 to 12/31/19  [employer] P[employer] Program

## 2019-02-14 ENCOUNTER — PATIENT MESSAGE (OUTPATIENT)
Dept: HEMATOLOGY/ONCOLOGY | Facility: CLINIC | Age: 69
End: 2019-02-14

## 2019-02-15 DIAGNOSIS — D46.C MDS (MYELODYSPLASTIC SYNDROME) WITH 5Q DELETION: Primary | ICD-10-CM

## 2019-02-15 DIAGNOSIS — D46.9 MDS (MYELODYSPLASTIC SYNDROME): Primary | ICD-10-CM

## 2019-02-15 RX ORDER — ACETAMINOPHEN 325 MG/1
650 TABLET ORAL
Status: CANCELLED | OUTPATIENT
Start: 2019-02-15

## 2019-02-15 RX ORDER — DIPHENHYDRAMINE HCL 25 MG
25 CAPSULE ORAL
Status: CANCELLED | OUTPATIENT
Start: 2019-02-15

## 2019-02-15 RX ORDER — HYDROCODONE BITARTRATE AND ACETAMINOPHEN 500; 5 MG/1; MG/1
TABLET ORAL ONCE
Status: CANCELLED | OUTPATIENT
Start: 2019-02-15 | End: 2019-02-15

## 2019-02-15 NOTE — PROGRESS NOTES
Ordered 1 unit prbc to be transfused Saturday.    Altagracia Cornejo, FNP  Hematology/Oncology/Bone Marrow Transplant

## 2019-02-16 ENCOUNTER — INFUSION (OUTPATIENT)
Dept: INFUSION THERAPY | Facility: HOSPITAL | Age: 69
End: 2019-02-16
Attending: INTERNAL MEDICINE
Payer: MEDICARE

## 2019-02-16 VITALS
RESPIRATION RATE: 18 BRPM | DIASTOLIC BLOOD PRESSURE: 53 MMHG | SYSTOLIC BLOOD PRESSURE: 111 MMHG | TEMPERATURE: 98 F | HEART RATE: 75 BPM

## 2019-02-16 DIAGNOSIS — D46.9 MDS (MYELODYSPLASTIC SYNDROME): ICD-10-CM

## 2019-02-16 DIAGNOSIS — D46.C MDS (MYELODYSPLASTIC SYNDROME) WITH 5Q DELETION: ICD-10-CM

## 2019-02-16 PROCEDURE — 36430 TRANSFUSION BLD/BLD COMPNT: CPT | Mod: HCNC

## 2019-02-16 PROCEDURE — 27201040 HC RC 50 FILTER: Mod: HCNC

## 2019-02-16 PROCEDURE — 25000003 PHARM REV CODE 250: Mod: HCNC | Performed by: NURSE PRACTITIONER

## 2019-02-16 RX ORDER — DIPHENHYDRAMINE HCL 25 MG
25 CAPSULE ORAL
Status: COMPLETED | OUTPATIENT
Start: 2019-02-16 | End: 2019-02-16

## 2019-02-16 RX ORDER — HYDROCODONE BITARTRATE AND ACETAMINOPHEN 500; 5 MG/1; MG/1
TABLET ORAL ONCE
Status: COMPLETED | OUTPATIENT
Start: 2019-02-16 | End: 2019-02-16

## 2019-02-16 RX ORDER — ACETAMINOPHEN 325 MG/1
650 TABLET ORAL
Status: COMPLETED | OUTPATIENT
Start: 2019-02-16 | End: 2019-02-16

## 2019-02-16 RX ADMIN — SODIUM CHLORIDE: 0.9 INJECTION, SOLUTION INTRAVENOUS at 08:02

## 2019-02-16 RX ADMIN — ACETAMINOPHEN 650 MG: 325 TABLET ORAL at 08:02

## 2019-02-16 RX ADMIN — DIPHENHYDRAMINE HYDROCHLORIDE 25 MG: 25 CAPSULE ORAL at 08:02

## 2019-02-16 NOTE — PLAN OF CARE
Problem: Adult Inpatient Plan of Care  Goal: Readiness for Transition of Care    Intervention: Mutually Develop Transition Plan  1L PRBC tolerated well, PIV d/c cath tip intact site covered with 2x2 gauze and coban, vitals stable, d/c home ambulatory in stable condition, instructed to contact MD office with questions, avs printed and reviewed.

## 2019-02-18 ENCOUNTER — TELEPHONE (OUTPATIENT)
Dept: CARDIOLOGY | Facility: CLINIC | Age: 69
End: 2019-02-18

## 2019-02-18 NOTE — TELEPHONE ENCOUNTER
Verified Rx with the pharmacist at [facility] for Praluent as Rx'd by  on 12/14/18 .

## 2019-02-20 DIAGNOSIS — D46.C MDS (MYELODYSPLASTIC SYNDROME) WITH 5Q DELETION: ICD-10-CM

## 2019-02-20 RX ORDER — LENALIDOMIDE 10 MG/1
10 CAPSULE ORAL DAILY
Qty: 28 EACH | Refills: 0 | Status: ON HOLD | OUTPATIENT
Start: 2019-02-20 | End: 2019-03-17 | Stop reason: HOSPADM

## 2019-02-21 ENCOUNTER — PATIENT MESSAGE (OUTPATIENT)
Dept: HEMATOLOGY/ONCOLOGY | Facility: CLINIC | Age: 69
End: 2019-02-21

## 2019-02-21 DIAGNOSIS — F32.A DEPRESSION, UNSPECIFIED DEPRESSION TYPE: ICD-10-CM

## 2019-02-21 DIAGNOSIS — K21.9 GASTROESOPHAGEAL REFLUX DISEASE WITHOUT ESOPHAGITIS: ICD-10-CM

## 2019-02-21 DIAGNOSIS — D46.C MDS (MYELODYSPLASTIC SYNDROME) WITH 5Q DELETION: ICD-10-CM

## 2019-02-21 RX ORDER — PANTOPRAZOLE SODIUM 40 MG/1
40 TABLET, DELAYED RELEASE ORAL DAILY
Qty: 30 TABLET | Refills: 1 | Status: SHIPPED | OUTPATIENT
Start: 2019-02-21 | End: 2019-05-16 | Stop reason: SDUPTHER

## 2019-02-21 RX ORDER — CITALOPRAM 20 MG/1
20 TABLET, FILM COATED ORAL DAILY
Qty: 30 TABLET | Refills: 2 | Status: SHIPPED | OUTPATIENT
Start: 2019-02-21 | End: 2019-10-25

## 2019-02-21 RX ORDER — PREDNISONE 5 MG/1
TABLET ORAL
Qty: 60 TABLET | Refills: 0 | Status: SHIPPED | OUTPATIENT
Start: 2019-02-21 | End: 2019-05-20 | Stop reason: ALTCHOICE

## 2019-02-25 RX ORDER — CITALOPRAM 20 MG/1
TABLET, FILM COATED ORAL
Qty: 30 TABLET | Refills: 2 | Status: SHIPPED | OUTPATIENT
Start: 2019-02-25 | End: 2019-07-15

## 2019-02-25 RX ORDER — PANTOPRAZOLE SODIUM 40 MG/1
TABLET, DELAYED RELEASE ORAL
Qty: 30 TABLET | Refills: 1 | OUTPATIENT
Start: 2019-02-25

## 2019-02-27 ENCOUNTER — PATIENT MESSAGE (OUTPATIENT)
Dept: HEMATOLOGY/ONCOLOGY | Facility: CLINIC | Age: 69
End: 2019-02-27

## 2019-02-27 ENCOUNTER — TELEPHONE (OUTPATIENT)
Dept: HEMATOLOGY/ONCOLOGY | Facility: CLINIC | Age: 69
End: 2019-02-27

## 2019-02-27 DIAGNOSIS — D46.9 MDS (MYELODYSPLASTIC SYNDROME): Primary | ICD-10-CM

## 2019-02-27 RX ORDER — DIPHENHYDRAMINE HCL 25 MG
25 CAPSULE ORAL
Status: CANCELLED | OUTPATIENT
Start: 2019-02-27

## 2019-02-27 RX ORDER — ACETAMINOPHEN 325 MG/1
650 TABLET ORAL
Status: CANCELLED | OUTPATIENT
Start: 2019-02-27

## 2019-02-27 RX ORDER — HYDROCODONE BITARTRATE AND ACETAMINOPHEN 500; 5 MG/1; MG/1
TABLET ORAL ONCE
Status: CANCELLED | OUTPATIENT
Start: 2019-02-27 | End: 2019-02-27

## 2019-02-27 NOTE — PROGRESS NOTES
Ordered 1 unit prbc to be transfused in infusion center today for hbg of 6.3.    Altagracia Cornejo, FNP  Hematology/Oncology/Bone Marrow Transplant

## 2019-02-28 ENCOUNTER — INFUSION (OUTPATIENT)
Dept: INFUSION THERAPY | Facility: HOSPITAL | Age: 69
End: 2019-02-28
Attending: INTERNAL MEDICINE
Payer: MEDICARE

## 2019-02-28 ENCOUNTER — TELEPHONE (OUTPATIENT)
Dept: HEMATOLOGY/ONCOLOGY | Facility: CLINIC | Age: 69
End: 2019-02-28

## 2019-02-28 ENCOUNTER — OFFICE VISIT (OUTPATIENT)
Dept: HEMATOLOGY/ONCOLOGY | Facility: CLINIC | Age: 69
End: 2019-02-28
Payer: MEDICARE

## 2019-02-28 VITALS
WEIGHT: 189.63 LBS | HEART RATE: 87 BPM | HEIGHT: 65 IN | OXYGEN SATURATION: 97 % | DIASTOLIC BLOOD PRESSURE: 60 MMHG | RESPIRATION RATE: 18 BRPM | BODY MASS INDEX: 31.59 KG/M2 | TEMPERATURE: 99 F | SYSTOLIC BLOOD PRESSURE: 143 MMHG

## 2019-02-28 VITALS
HEART RATE: 69 BPM | SYSTOLIC BLOOD PRESSURE: 118 MMHG | TEMPERATURE: 98 F | DIASTOLIC BLOOD PRESSURE: 56 MMHG | RESPIRATION RATE: 18 BRPM

## 2019-02-28 DIAGNOSIS — D46.9 MDS (MYELODYSPLASTIC SYNDROME): Primary | ICD-10-CM

## 2019-02-28 DIAGNOSIS — D46.C MDS (MYELODYSPLASTIC SYNDROME) WITH 5Q DELETION: Primary | ICD-10-CM

## 2019-02-28 DIAGNOSIS — D46.9 MDS (MYELODYSPLASTIC SYNDROME): ICD-10-CM

## 2019-02-28 DIAGNOSIS — D61.818 PANCYTOPENIA: ICD-10-CM

## 2019-02-28 PROCEDURE — P9038 RBC IRRADIATED: HCPCS | Mod: HCNC

## 2019-02-28 PROCEDURE — 27201040 HC RC 50 FILTER: Mod: HCNC

## 2019-02-28 PROCEDURE — 1101F PR PT FALLS ASSESS DOC 0-1 FALLS W/OUT INJ PAST YR: ICD-10-PCS | Mod: HCNC,CPTII,S$GLB, | Performed by: INTERNAL MEDICINE

## 2019-02-28 PROCEDURE — 36430 TRANSFUSION BLD/BLD COMPNT: CPT | Mod: HCNC

## 2019-02-28 PROCEDURE — 99215 PR OFFICE/OUTPT VISIT, EST, LEVL V, 40-54 MIN: ICD-10-PCS | Mod: HCNC,S$GLB,, | Performed by: INTERNAL MEDICINE

## 2019-02-28 PROCEDURE — 86920 COMPATIBILITY TEST SPIN: CPT | Mod: HCNC

## 2019-02-28 PROCEDURE — 1101F PT FALLS ASSESS-DOCD LE1/YR: CPT | Mod: HCNC,CPTII,S$GLB, | Performed by: INTERNAL MEDICINE

## 2019-02-28 PROCEDURE — 99999 PR PBB SHADOW E&M-EST. PATIENT-LVL III: CPT | Mod: PBBFAC,HCNC,, | Performed by: INTERNAL MEDICINE

## 2019-02-28 PROCEDURE — 3078F PR MOST RECENT DIASTOLIC BLOOD PRESSURE < 80 MM HG: ICD-10-PCS | Mod: HCNC,CPTII,S$GLB, | Performed by: INTERNAL MEDICINE

## 2019-02-28 PROCEDURE — 99999 PR PBB SHADOW E&M-EST. PATIENT-LVL III: ICD-10-PCS | Mod: PBBFAC,HCNC,, | Performed by: INTERNAL MEDICINE

## 2019-02-28 PROCEDURE — 99215 OFFICE O/P EST HI 40 MIN: CPT | Mod: HCNC,S$GLB,, | Performed by: INTERNAL MEDICINE

## 2019-02-28 PROCEDURE — 3078F DIAST BP <80 MM HG: CPT | Mod: HCNC,CPTII,S$GLB, | Performed by: INTERNAL MEDICINE

## 2019-02-28 PROCEDURE — 3077F PR MOST RECENT SYSTOLIC BLOOD PRESSURE >= 140 MM HG: ICD-10-PCS | Mod: HCNC,CPTII,S$GLB, | Performed by: INTERNAL MEDICINE

## 2019-02-28 PROCEDURE — 3077F SYST BP >= 140 MM HG: CPT | Mod: HCNC,CPTII,S$GLB, | Performed by: INTERNAL MEDICINE

## 2019-02-28 PROCEDURE — 25000003 PHARM REV CODE 250: Mod: HCNC | Performed by: NURSE PRACTITIONER

## 2019-02-28 RX ORDER — HYDROCODONE BITARTRATE AND ACETAMINOPHEN 500; 5 MG/1; MG/1
TABLET ORAL ONCE
Status: COMPLETED | OUTPATIENT
Start: 2019-02-28 | End: 2019-02-28

## 2019-02-28 RX ORDER — ACETAMINOPHEN 325 MG/1
650 TABLET ORAL
Status: COMPLETED | OUTPATIENT
Start: 2019-02-28 | End: 2019-02-28

## 2019-02-28 RX ORDER — DIPHENHYDRAMINE HCL 25 MG
25 CAPSULE ORAL
Status: COMPLETED | OUTPATIENT
Start: 2019-02-28 | End: 2019-02-28

## 2019-02-28 RX ADMIN — ACETAMINOPHEN 650 MG: 325 TABLET ORAL at 08:02

## 2019-02-28 RX ADMIN — SODIUM CHLORIDE: 9 INJECTION, SOLUTION INTRAVENOUS at 08:02

## 2019-02-28 RX ADMIN — DIPHENHYDRAMINE HYDROCHLORIDE 25 MG: 25 CAPSULE ORAL at 08:02

## 2019-02-28 NOTE — Clinical Note
Cbc and type and screen every 2 weeksReturn visit with CBC, CMP and type and screen 3/28/19 to review bone marrow biopsy results

## 2019-02-28 NOTE — TELEPHONE ENCOUNTER
Signed case request for BMBx in OR.    Altagracia Cornejo, FNP  Hematology/Oncology/Bone Marrow Transplant

## 2019-02-28 NOTE — PLAN OF CARE
Problem: Adult Inpatient Plan of Care  Goal: Plan of Care Review  Patient tolerated infusion well. AVS provided, VS stable, discharged to home

## 2019-02-28 NOTE — PROGRESS NOTES
Subjective:       Patient ID: Susan Puente is a 68 y.o. female.    Chief Complaint: Myelodysplastic Syndrome    Patient presents today for follow up of her history of MDS 5 q del syndrome.  Revlimid had been stopped since June 2017 after she developed pancytopenia while on Revlimid (required desensitization). CBC was normal for almost 1 year and marrow was negative for del5q. Bone marrow biopsy repeat from 6/2018 showed relapsed 5q minus MDS without new or additional mutations. Revlimid started at 2.5 mg daily with prednisone to prevent allergic reaction. Goal Revlimid 10 mg and increased dose slowly to achieve this level while avoiding allergic reaction.     She is currently on 10 mg daily since 12/19/18. She remains on Prednisone 5 mg daily (weaned from 10mg daily on 12/19/18). She has been on prednisone since restarting Revlimid due to allergy. She is no longer having any reactions to revlimid. She denies rash, fevers, chills, diarrhea, nausea, vomiting, constipation, chest pain, SOB.  She recently has required PRBC transfusions about twice per month. She will receive 1 U PRBCs today for Hgb 6.3 grams. CBC today also shows a trend of decreasing WBC with eosinophilia and basophilia. Also having thrombocytopenia.     Oncology History   Ms. Puente is a 68 year old female with hx of DM2, peripheral vascular disease, tobacco use, CAD, hyperlipidemia, hypertension who was hospitalized 11/10/16 for anemia. hgb 5.3  MCH 43.4 with normal WBC and platelets. Patient had normal iron stores. She was transfused 3 units of PRBCs and discharged home with hgb 8.7 on 11/11/16. She was referred for further evalutation of her anemia. On 12/16/16 patient had a normal SPEP and immunofixation. Slightly elevated kappa light chains with normal ration. Elevated vitamin b12, normal folate, JAYDEN was positive with a low titer and negative profile. Patient had a bone marrow biopsy 1/5/16 which showed the core biopsy is  normocellular for age (40%); however, megakaryocytes are increased and show frequent small, hypolobated forms. Additionally, a subset of the neutrophils are hypogranular. Blasts are not increased by either morphology (1.2%) or in the corresponding flow cytometric analysis. Fish detects a 5q deletion in 54.5% of nuclei. Cytogenetics reported 20 metaphases, 2 metaphases were normal and 18 metaphases had a 5q deletion. No additional cytogenetic abnormalities were detected. Findings consistent with 5q deletion syndrome.     Patient had a delay in obtaining Revlimid 10 mg daily but did start taking the medication 2/8/17. On 2/9/17 patient developed a diffuse maculopapular rash throughout scalp arms, legs and torso. She states she had no stridor or wheezing. She discontinued medication 2/15/17. Went to allergist who provided references on desensitization so that patient could resume Revlimid. Unfortunately developed pancytopenia and Revlimid stopped 6/16/17. She had a repeat BMBX  performed 6/8/17 and showed a hypercellular marrow (60%) continued atypia in the granulocytes and megakaryocytes were noted.  Additionally, there is erythroid atypia present. No increase in blasts. Cytogenetics are normal and MDS FISH is negative, failing to show 5 q minus. NGS should no significant molecular mutations.  Anemia work up revealed bienvenido negative hemolysis.      Review of Systems   Constitutional: Positive for fatigue. Negative for fever.   HENT: Negative for sinus congestion or rhinorrhea. Negative for ear pain, mouth sores, nosebleeds and trouble swallowing.    Eyes:      Respiratory: Negative for cough, shortness of breath and wheezing.    Cardiovascular: Negative for chest pain and leg swelling.   Gastrointestinal: Negative for abdominal distention, abdominal pain, blood in stool, constipation, diarrhea, nausea and vomiting.        Improved with Imodium   Endocrine: Negative for polyphagia and polyuria.   Genitourinary:  Negative for dysuria, hematuria and urgency.   Musculoskeletal: Positive for arthralgias. Negative for myalgias.   Skin: Negative for color change, pallor and rash.   Neurological: Positive for numbness (to balls of feet; likely secondary to DM2). Negative for dizziness, weakness, light-headedness and headaches.   Hematological: Negative for adenopathy. Does not bruise/bleed easily.   Psychiatric/Behavioral: Negative for agitation and behavioral problems.       Objective:      Physical Exam   Constitutional: She is oriented to person, place, and time. She appears well-developed and well-nourished.   HENT:   Head: Normocephalic and atraumatic.   Right Ear: External ear normal.   Left Ear: External ear normal.   Mouth/Throat: Oropharynx is clear and moist. No oropharyngeal exudate.   Eyes: Conjunctivae and EOM are normal. Pupils are equal, round, and reactive to light. Right eye exhibits no discharge. Left eye exhibits no discharge.   Neck: Normal range of motion. Neck supple.   Cardiovascular: Normal rate, regular rhythm, normal heart sounds and intact distal pulses.   No murmur heard.  Pulmonary/Chest: Effort normal and breath sounds normal. No respiratory distress. She has no wheezes.   Abdominal: Soft. Bowel sounds are normal. She exhibits no distension and no mass. There is no tenderness.   Musculoskeletal: Normal range of motion. She exhibits no edema.   Lymphadenopathy:     She has no cervical adenopathy.   Neurological: She is alert and oriented to person, place, and time.   Skin: Skin is warm and dry. No rash noted.   Psychiatric: She has a normal mood and affect. Her behavior is normal. Judgment and thought content normal.   Nursing note and vitals reviewed.      Assessment:       1. MDS (myelodysplastic syndrome) with 5q deletion    2. Pancytopenia        Plan:     MDS/Pancytopenia  Initially with 5 q minus syndrome and treated with Revlimid. Developed allergy and was then desensitized. Soon after developed  pancytopenia and Revlimid held since June 2017.  BMBX,6/8/17, had normal cytogenetics. Repeat marrow from 6/7/18 shows relapsed 5q minus. Discussed treatment options of HMA therapy or repeat trial of Revlimid and patient wished to proceed with Revlimid   Currently on Revlimid 10 mg daily since 12/19/18.  Worsening pancytopenia noted today on CBC- WBC 2.54, Hgb 6.6, Plt 97. Discussed with Dr. Valdes. Continue revlimid 10mg QD and recheck labs in 2 weeks.  1 U PRBCs today for Hgb 6.6. Pt requiring PRN PRBCs ~ twice monthly.  Started Prednisone wean 12/19/18 and decreased to 5 mg daily (from 10 mg daily). Continue 5mg QD prednisone. Will continue Protonix for steroid induced GERD.   No indication for Plt transfusion; stop ASA if Platelets<50k    Stage 3 CKD  - stable; Creatinine normal at 1.2; continue to monitor with visits    DM2  - management per PCP; BG WNL today  - continue metformin; educated to perform daily foot checks with neuropathy to feet    HTN  - management per PCP  - continue lisinopril-HCTZ    CAD  - attempting to get praluent for HLD per PCP; intolerant to statins; on ASA    Anxiety/Depression  She reports improved mood now on Celexa. Continue.     F/U  Bone marrow biopsy with sedation 3/14/19  Return visit 3/28/19 to review bone marrow biopsy results.  Continue CBC every 2 weeks with type and screen for transfusion support as needed.

## 2019-03-06 ENCOUNTER — TELEPHONE (OUTPATIENT)
Dept: CARDIOLOGY | Facility: CLINIC | Age: 69
End: 2019-03-06

## 2019-03-06 NOTE — TELEPHONE ENCOUNTER
Left a detailed VM for the pt with call back name and number and also left a VM for the pt's emergency contact with vm and call back name and number.

## 2019-03-06 NOTE — TELEPHONE ENCOUNTER
----- Message from Rae Ling sent at 3/6/2019  3:40 PM CST -----  Contact: Nathen Hernandez, Access Mgr with [employer] [phone] is calling to see if we can reach out to this pt and let them know that their alirocumab (PRALUENT PEN) 75 mg/mL PnIj has been apporved and [employer] has been trying unsusseffuly trying to reach them to get a shipment date ready.  They need to call [phone].  Please feel free to call Nathen with any questions or concerns.  This is a pt of Dr. Bazan    Thank you

## 2019-03-07 ENCOUNTER — PATIENT MESSAGE (OUTPATIENT)
Dept: HEMATOLOGY/ONCOLOGY | Facility: CLINIC | Age: 69
End: 2019-03-07

## 2019-03-07 DIAGNOSIS — I25.10 CORONARY ARTERY DISEASE, ANGINA PRESENCE UNSPECIFIED, UNSPECIFIED VESSEL OR LESION TYPE, UNSPECIFIED WHETHER NATIVE OR TRANSPLANTED HEART: ICD-10-CM

## 2019-03-11 RX ORDER — LISINOPRIL AND HYDROCHLOROTHIAZIDE 12.5; 2 MG/1; MG/1
TABLET ORAL
Qty: 180 TABLET | Refills: 0 | Status: ON HOLD | OUTPATIENT
Start: 2019-03-11 | End: 2019-03-17 | Stop reason: SDUPTHER

## 2019-03-12 ENCOUNTER — LAB VISIT (OUTPATIENT)
Dept: LAB | Facility: HOSPITAL | Age: 69
DRG: 189 | End: 2019-03-12
Attending: INTERNAL MEDICINE
Payer: MEDICARE

## 2019-03-12 ENCOUNTER — INFUSION (OUTPATIENT)
Dept: INFUSION THERAPY | Facility: HOSPITAL | Age: 69
DRG: 189 | End: 2019-03-12
Attending: INTERNAL MEDICINE
Payer: MEDICARE

## 2019-03-12 ENCOUNTER — HOSPITAL ENCOUNTER (INPATIENT)
Facility: HOSPITAL | Age: 69
LOS: 5 days | Discharge: HOME | DRG: 189 | End: 2019-03-17
Attending: EMERGENCY MEDICINE | Admitting: EMERGENCY MEDICINE
Payer: MEDICARE

## 2019-03-12 VITALS
DIASTOLIC BLOOD PRESSURE: 76 MMHG | SYSTOLIC BLOOD PRESSURE: 173 MMHG | RESPIRATION RATE: 18 BRPM | HEART RATE: 89 BPM | TEMPERATURE: 99 F

## 2019-03-12 DIAGNOSIS — D46.C MDS (MYELODYSPLASTIC SYNDROME) WITH 5Q DELETION: ICD-10-CM

## 2019-03-12 DIAGNOSIS — J96.01 ACUTE HYPOXEMIC RESPIRATORY FAILURE: ICD-10-CM

## 2019-03-12 DIAGNOSIS — I25.10 CORONARY ARTERY DISEASE, ANGINA PRESENCE UNSPECIFIED, UNSPECIFIED VESSEL OR LESION TYPE, UNSPECIFIED WHETHER NATIVE OR TRANSPLANTED HEART: ICD-10-CM

## 2019-03-12 DIAGNOSIS — D46.9 MDS (MYELODYSPLASTIC SYNDROME): ICD-10-CM

## 2019-03-12 DIAGNOSIS — D46.C MDS (MYELODYSPLASTIC SYNDROME) WITH 5Q DELETION: Primary | ICD-10-CM

## 2019-03-12 DIAGNOSIS — R40.4 ALTERED LEVEL OF CONSCIOUSNESS: Primary | ICD-10-CM

## 2019-03-12 LAB
ABO + RH BLD: NORMAL
ABO + RH BLD: NORMAL
ALBUMIN SERPL BCP-MCNC: 3.6 G/DL
ALBUMIN SERPL BCP-MCNC: 3.6 G/DL
ALLENS TEST: ABNORMAL
ALP SERPL-CCNC: 76 U/L
ALP SERPL-CCNC: 78 U/L
ALT SERPL W/O P-5'-P-CCNC: 100 U/L
ALT SERPL W/O P-5'-P-CCNC: 101 U/L
ANION GAP SERPL CALC-SCNC: 11 MMOL/L
ANION GAP SERPL CALC-SCNC: 24 MMOL/L
ANISOCYTOSIS BLD QL SMEAR: SLIGHT
AST SERPL-CCNC: 32 U/L
AST SERPL-CCNC: 41 U/L
BASOPHILS # BLD AUTO: 0.14 K/UL
BASOPHILS NFR BLD: 2.4 %
BASOPHILS NFR BLD: 6 %
BILIRUB SERPL-MCNC: 1.4 MG/DL
BILIRUB SERPL-MCNC: 3.1 MG/DL
BLD GP AB SCN CELLS X3 SERPL QL: NORMAL
BLD GP AB SCN CELLS X3 SERPL QL: NORMAL
BLD PROD TYP BPU: NORMAL
BLOOD UNIT EXPIRATION DATE: NORMAL
BLOOD UNIT TYPE CODE: 6200
BLOOD UNIT TYPE: NORMAL
BNP SERPL-MCNC: 647 PG/ML
BUN SERPL-MCNC: 17 MG/DL
BUN SERPL-MCNC: 19 MG/DL
CALCIUM SERPL-MCNC: 9.2 MG/DL
CALCIUM SERPL-MCNC: 9.5 MG/DL
CHLORIDE SERPL-SCNC: 104 MMOL/L
CHLORIDE SERPL-SCNC: 106 MMOL/L
CO2 SERPL-SCNC: 12 MMOL/L
CO2 SERPL-SCNC: 23 MMOL/L
CODING SYSTEM: NORMAL
CREAT SERPL-MCNC: 1.1 MG/DL
CREAT SERPL-MCNC: 1.1 MG/DL (ref 0.5–1.4)
CREAT SERPL-MCNC: 1.3 MG/DL
DELSYS: ABNORMAL
DIFFERENTIAL METHOD: ABNORMAL
DIFFERENTIAL METHOD: ABNORMAL
DISPENSE STATUS: NORMAL
EOSINOPHIL # BLD AUTO: 0.5 K/UL
EOSINOPHIL NFR BLD: 15 %
EOSINOPHIL NFR BLD: 9.2 %
ERYTHROCYTE [DISTWIDTH] IN BLOOD BY AUTOMATED COUNT: 29.8 %
ERYTHROCYTE [DISTWIDTH] IN BLOOD BY AUTOMATED COUNT: ABNORMAL %
EST. GFR  (AFRICAN AMERICAN): 48.7 ML/MIN/1.73 M^2
EST. GFR  (AFRICAN AMERICAN): 59.6 ML/MIN/1.73 M^2
EST. GFR  (NON AFRICAN AMERICAN): 42.3 ML/MIN/1.73 M^2
EST. GFR  (NON AFRICAN AMERICAN): 51.7 ML/MIN/1.73 M^2
FIO2: 100
FLOW: 15
GIANT PLATELETS BLD QL SMEAR: PRESENT
GLUCOSE SERPL-MCNC: 116 MG/DL
GLUCOSE SERPL-MCNC: 290 MG/DL
HCO3 UR-SCNC: 19.2 MMOL/L (ref 24–28)
HCT VFR BLD AUTO: 17.4 %
HCT VFR BLD AUTO: 22.6 %
HGB BLD-MCNC: 5.6 G/DL
HGB BLD-MCNC: 7 G/DL
HYPOCHROMIA BLD QL SMEAR: ABNORMAL
IMM GRANULOCYTES # BLD AUTO: 0 K/UL
IMM GRANULOCYTES # BLD AUTO: ABNORMAL K/UL
IMM GRANULOCYTES NFR BLD AUTO: 0 %
IMM GRANULOCYTES NFR BLD AUTO: ABNORMAL %
INR PPP: 1.1
LYMPHOCYTES # BLD AUTO: 4.1 K/UL
LYMPHOCYTES NFR BLD: 40 %
LYMPHOCYTES NFR BLD: 69.6 %
MCH RBC QN AUTO: 33.9 PG
MCH RBC QN AUTO: 34 PG
MCHC RBC AUTO-ENTMCNC: 31 G/DL
MCHC RBC AUTO-ENTMCNC: 32.2 G/DL
MCV RBC AUTO: 106 FL
MCV RBC AUTO: 110 FL
MODE: ABNORMAL
MONOCYTES # BLD AUTO: 0.3 K/UL
MONOCYTES NFR BLD: 4.9 %
MONOCYTES NFR BLD: 5 %
NEUTROPHILS # BLD AUTO: 0.8 K/UL
NEUTROPHILS NFR BLD: 13.9 %
NEUTROPHILS NFR BLD: 34 %
NRBC BLD-RTO: 2 /100 WBC
NRBC BLD-RTO: 8 /100 WBC
NUM UNITS TRANS PACKED RBC: NORMAL
OVALOCYTES BLD QL SMEAR: ABNORMAL
PCO2 BLDA: 45.1 MMHG (ref 35–45)
PH SMN: 7.24 [PH] (ref 7.35–7.45)
PLATELET # BLD AUTO: 28 K/UL
PLATELET # BLD AUTO: 41 K/UL
PLATELET BLD QL SMEAR: ABNORMAL
PMV BLD AUTO: 13 FL
PMV BLD AUTO: ABNORMAL FL
PO2 BLDA: 167 MMHG (ref 40–60)
POC BE: -8 MMOL/L
POC PTINR: 1.1 (ref 0.9–1.2)
POC PTWBT: 12.7 SEC (ref 9.7–14.3)
POC SATURATED O2: 99 % (ref 95–100)
POC TCO2: 21 MMOL/L (ref 24–29)
POCT GLUCOSE: 308 MG/DL (ref 70–110)
POIKILOCYTOSIS BLD QL SMEAR: SLIGHT
POLYCHROMASIA BLD QL SMEAR: ABNORMAL
POTASSIUM SERPL-SCNC: 3.4 MMOL/L
POTASSIUM SERPL-SCNC: 3.9 MMOL/L
PROT SERPL-MCNC: 6.8 G/DL
PROT SERPL-MCNC: 6.8 G/DL
PROTHROMBIN TIME: 11.6 SEC
RBC # BLD AUTO: 1.65 M/UL
RBC # BLD AUTO: 2.06 M/UL
SAMPLE: ABNORMAL
SAMPLE: NORMAL
SAMPLE: NORMAL
SITE: ABNORMAL
SODIUM SERPL-SCNC: 140 MMOL/L
SODIUM SERPL-SCNC: 140 MMOL/L
SP02: 100
SPHEROCYTES BLD QL SMEAR: ABNORMAL
TROPONIN I SERPL DL<=0.01 NG/ML-MCNC: 0.05 NG/ML
TSH SERPL DL<=0.005 MIU/L-ACNC: 1.16 UIU/ML
WBC # BLD AUTO: 1.67 K/UL
WBC # BLD AUTO: 5.86 K/UL

## 2019-03-12 PROCEDURE — 82962 GLUCOSE BLOOD TEST: CPT | Mod: HCNC

## 2019-03-12 PROCEDURE — 99223 PR INITIAL HOSPITAL CARE,LEVL III: ICD-10-PCS | Mod: HCNC,,, | Performed by: PSYCHIATRY & NEUROLOGY

## 2019-03-12 PROCEDURE — 86850 RBC ANTIBODY SCREEN: CPT | Mod: HCNC

## 2019-03-12 PROCEDURE — 85025 COMPLETE CBC W/AUTO DIFF WBC: CPT | Mod: HCNC

## 2019-03-12 PROCEDURE — 99285 EMERGENCY DEPT VISIT HI MDM: CPT | Mod: 25,HCNC

## 2019-03-12 PROCEDURE — 83880 ASSAY OF NATRIURETIC PEPTIDE: CPT | Mod: HCNC

## 2019-03-12 PROCEDURE — 93005 ELECTROCARDIOGRAM TRACING: CPT | Mod: HCNC

## 2019-03-12 PROCEDURE — 85027 COMPLETE CBC AUTOMATED: CPT | Mod: HCNC

## 2019-03-12 PROCEDURE — 80053 COMPREHEN METABOLIC PANEL: CPT | Mod: HCNC

## 2019-03-12 PROCEDURE — P9038 RBC IRRADIATED: HCPCS | Mod: HCNC

## 2019-03-12 PROCEDURE — 80053 COMPREHEN METABOLIC PANEL: CPT | Mod: 91,HCNC

## 2019-03-12 PROCEDURE — 84443 ASSAY THYROID STIM HORMONE: CPT | Mod: HCNC

## 2019-03-12 PROCEDURE — 99223 1ST HOSP IP/OBS HIGH 75: CPT | Mod: HCNC,,, | Performed by: PSYCHIATRY & NEUROLOGY

## 2019-03-12 PROCEDURE — 82565 ASSAY OF CREATININE: CPT | Mod: HCNC

## 2019-03-12 PROCEDURE — 27201040 HC RC 50 FILTER: Mod: HCNC

## 2019-03-12 PROCEDURE — 86920 COMPATIBILITY TEST SPIN: CPT | Mod: HCNC

## 2019-03-12 PROCEDURE — G0378 HOSPITAL OBSERVATION PER HR: HCPCS | Mod: HCNC

## 2019-03-12 PROCEDURE — 85007 BL SMEAR W/DIFF WBC COUNT: CPT | Mod: HCNC

## 2019-03-12 PROCEDURE — 25000003 PHARM REV CODE 250: Mod: HCNC | Performed by: INTERNAL MEDICINE

## 2019-03-12 PROCEDURE — 93010 EKG 12-LEAD: ICD-10-PCS | Mod: HCNC,,, | Performed by: INTERNAL MEDICINE

## 2019-03-12 PROCEDURE — 12000002 HC ACUTE/MED SURGE SEMI-PRIVATE ROOM: Mod: HCNC

## 2019-03-12 PROCEDURE — 84484 ASSAY OF TROPONIN QUANT: CPT | Mod: HCNC

## 2019-03-12 PROCEDURE — 99284 EMERGENCY DEPT VISIT MOD MDM: CPT | Mod: ,,, | Performed by: EMERGENCY MEDICINE

## 2019-03-12 PROCEDURE — 85610 PROTHROMBIN TIME: CPT | Mod: HCNC

## 2019-03-12 PROCEDURE — 36430 TRANSFUSION BLD/BLD COMPNT: CPT | Mod: HCNC

## 2019-03-12 PROCEDURE — 93010 ELECTROCARDIOGRAM REPORT: CPT | Mod: HCNC,,, | Performed by: INTERNAL MEDICINE

## 2019-03-12 PROCEDURE — 25000003 PHARM REV CODE 250: Mod: HCNC | Performed by: NURSE PRACTITIONER

## 2019-03-12 PROCEDURE — 82803 BLOOD GASES ANY COMBINATION: CPT | Mod: HCNC

## 2019-03-12 PROCEDURE — 99284 PR EMERGENCY DEPT VISIT,LEVEL IV: ICD-10-PCS | Mod: ,,, | Performed by: EMERGENCY MEDICINE

## 2019-03-12 PROCEDURE — 99900035 HC TECH TIME PER 15 MIN (STAT): Mod: HCNC

## 2019-03-12 PROCEDURE — 86901 BLOOD TYPING SEROLOGIC RH(D): CPT | Mod: 91,HCNC

## 2019-03-12 PROCEDURE — 36415 COLL VENOUS BLD VENIPUNCTURE: CPT | Mod: HCNC

## 2019-03-12 RX ORDER — HYDROCODONE BITARTRATE AND ACETAMINOPHEN 500; 5 MG/1; MG/1
TABLET ORAL ONCE
Status: COMPLETED | OUTPATIENT
Start: 2019-03-12 | End: 2019-03-12

## 2019-03-12 RX ORDER — DIPHENHYDRAMINE HCL 25 MG
25 CAPSULE ORAL
Status: CANCELLED | OUTPATIENT
Start: 2019-03-12

## 2019-03-12 RX ORDER — ACETAMINOPHEN 325 MG/1
650 TABLET ORAL
Status: CANCELLED | OUTPATIENT
Start: 2019-03-13

## 2019-03-12 RX ORDER — SODIUM CHLORIDE 0.9 % (FLUSH) 0.9 %
5 SYRINGE (ML) INJECTION
Status: CANCELLED | OUTPATIENT
Start: 2019-03-13

## 2019-03-12 RX ORDER — HYDROCODONE BITARTRATE AND ACETAMINOPHEN 500; 5 MG/1; MG/1
TABLET ORAL ONCE
Status: CANCELLED | OUTPATIENT
Start: 2019-03-12 | End: 2019-03-12

## 2019-03-12 RX ORDER — DIPHENHYDRAMINE HCL 25 MG
25 CAPSULE ORAL
Status: COMPLETED | OUTPATIENT
Start: 2019-03-12 | End: 2019-03-12

## 2019-03-12 RX ORDER — ACETAMINOPHEN 325 MG/1
650 TABLET ORAL
Status: DISCONTINUED | OUTPATIENT
Start: 2019-03-13 | End: 2019-03-12

## 2019-03-12 RX ORDER — ACETAMINOPHEN 325 MG/1
650 TABLET ORAL
Status: COMPLETED | OUTPATIENT
Start: 2019-03-12 | End: 2019-03-12

## 2019-03-12 RX ORDER — DIPHENHYDRAMINE HCL 25 MG
25 CAPSULE ORAL
Status: DISCONTINUED | OUTPATIENT
Start: 2019-03-13 | End: 2019-03-12

## 2019-03-12 RX ORDER — ACETAMINOPHEN 325 MG/1
650 TABLET ORAL
Status: CANCELLED | OUTPATIENT
Start: 2019-03-12

## 2019-03-12 RX ORDER — DIPHENHYDRAMINE HCL 25 MG
25 CAPSULE ORAL
Status: CANCELLED | OUTPATIENT
Start: 2019-03-13

## 2019-03-12 RX ADMIN — DIPHENHYDRAMINE HYDROCHLORIDE 25 MG: 25 CAPSULE ORAL at 03:03

## 2019-03-12 RX ADMIN — ACETAMINOPHEN 650 MG: 325 TABLET ORAL at 03:03

## 2019-03-12 RX ADMIN — SODIUM CHLORIDE: 0.9 INJECTION, SOLUTION INTRAVENOUS at 03:03

## 2019-03-12 NOTE — PLAN OF CARE
Problem: Adult Inpatient Plan of Care  Goal: Plan of Care Review  Outcome: Ongoing (interventions implemented as appropriate)  Patient tolerated 1 unit of prbc well today. Plan to rtc Friday for 2nd unit. PIV removed, catheter tip intact. Verbalized understanding to call MD for any questions/concerns. AVS given. Discharged home, escorted in wheelchair by RN downstairs where friend picked up patient.

## 2019-03-13 ENCOUNTER — ANESTHESIA EVENT (OUTPATIENT)
Dept: SURGERY | Facility: HOSPITAL | Age: 69
End: 2019-03-13
Payer: MEDICARE

## 2019-03-13 PROBLEM — R40.4 ALTERED LEVEL OF CONSCIOUSNESS: Status: ACTIVE | Noted: 2019-03-13

## 2019-03-13 PROBLEM — D61.818 PANCYTOPENIA: Status: ACTIVE | Noted: 2019-03-13

## 2019-03-13 PROBLEM — J96.01 ACUTE HYPOXEMIC RESPIRATORY FAILURE: Status: ACTIVE | Noted: 2019-03-13

## 2019-03-13 PROBLEM — R40.4 EPISODE OF UNRESPONSIVENESS: Status: RESOLVED | Noted: 2019-03-12 | Resolved: 2019-03-13

## 2019-03-13 PROBLEM — R40.4 ALTERED LEVEL OF CONSCIOUSNESS: Status: RESOLVED | Noted: 2019-03-12 | Resolved: 2019-03-13

## 2019-03-13 LAB
ALBUMIN SERPL BCP-MCNC: 3.1 G/DL
ALP SERPL-CCNC: 68 U/L
ALT SERPL W/O P-5'-P-CCNC: 97 U/L
ANION GAP SERPL CALC-SCNC: 8 MMOL/L
ANISOCYTOSIS BLD QL SMEAR: ABNORMAL
AST SERPL-CCNC: 45 U/L
BASOPHILS # BLD AUTO: 0.02 K/UL
BASOPHILS # BLD AUTO: 0.05 K/UL
BASOPHILS # BLD AUTO: 0.06 K/UL
BASOPHILS NFR BLD: 1.7 %
BASOPHILS NFR BLD: 4.2 %
BASOPHILS NFR BLD: 4.3 %
BILIRUB SERPL-MCNC: 1.4 MG/DL
BLD PROD TYP BPU: NORMAL
BLD PROD TYP BPU: NORMAL
BLOOD UNIT EXPIRATION DATE: NORMAL
BLOOD UNIT EXPIRATION DATE: NORMAL
BLOOD UNIT TYPE CODE: 6200
BLOOD UNIT TYPE CODE: 6200
BLOOD UNIT TYPE: NORMAL
BLOOD UNIT TYPE: NORMAL
BUN SERPL-MCNC: 24 MG/DL
CALCIUM SERPL-MCNC: 8.8 MG/DL
CHLORIDE SERPL-SCNC: 108 MMOL/L
CO2 SERPL-SCNC: 25 MMOL/L
CODING SYSTEM: NORMAL
CODING SYSTEM: NORMAL
CREAT SERPL-MCNC: 1 MG/DL
DACRYOCYTES BLD QL SMEAR: ABNORMAL
DAT IGG-SP REAG RBC-IMP: NORMAL
DIFFERENTIAL METHOD: ABNORMAL
DISPENSE STATUS: NORMAL
DISPENSE STATUS: NORMAL
EOSINOPHIL # BLD AUTO: 0.2 K/UL
EOSINOPHIL NFR BLD: 15 %
EOSINOPHIL NFR BLD: 15.1 %
EOSINOPHIL NFR BLD: 16.7 %
ERYTHROCYTE [DISTWIDTH] IN BLOOD BY AUTOMATED COUNT: 27.1 %
ERYTHROCYTE [DISTWIDTH] IN BLOOD BY AUTOMATED COUNT: 30 %
ERYTHROCYTE [DISTWIDTH] IN BLOOD BY AUTOMATED COUNT: 30.2 %
EST. GFR  (AFRICAN AMERICAN): >60 ML/MIN/1.73 M^2
EST. GFR  (NON AFRICAN AMERICAN): 58 ML/MIN/1.73 M^2
FERRITIN SERPL-MCNC: ABNORMAL NG/ML
FOLATE SERPL-MCNC: 5.4 NG/ML
GIANT PLATELETS BLD QL SMEAR: PRESENT
GLUCOSE SERPL-MCNC: 116 MG/DL
HAPTOGLOB SERPL-MCNC: 120 MG/DL
HAPTOGLOB SERPL-MCNC: 131 MG/DL
HCT VFR BLD AUTO: 17.4 %
HCT VFR BLD AUTO: 17.4 %
HCT VFR BLD AUTO: 20.1 %
HGB BLD-MCNC: 5.6 G/DL
HGB BLD-MCNC: 5.9 G/DL
HGB BLD-MCNC: 6.6 G/DL
HYPOCHROMIA BLD QL SMEAR: ABNORMAL
HYPOCHROMIA BLD QL SMEAR: ABNORMAL
IMM GRANULOCYTES # BLD AUTO: 0 K/UL
IMM GRANULOCYTES # BLD AUTO: 0.01 K/UL
IMM GRANULOCYTES # BLD AUTO: 0.01 K/UL
IMM GRANULOCYTES NFR BLD AUTO: 0 %
IMM GRANULOCYTES NFR BLD AUTO: 0.7 %
IMM GRANULOCYTES NFR BLD AUTO: 0.8 %
IRON SERPL-MCNC: 192 UG/DL
LACTATE SERPL-SCNC: 1.4 MMOL/L
LDH SERPL L TO P-CCNC: 335 U/L
LYMPHOCYTES # BLD AUTO: 0.6 K/UL
LYMPHOCYTES # BLD AUTO: 0.6 K/UL
LYMPHOCYTES # BLD AUTO: 0.7 K/UL
LYMPHOCYTES NFR BLD: 46.4 %
LYMPHOCYTES NFR BLD: 47.5 %
LYMPHOCYTES NFR BLD: 47.9 %
MAGNESIUM SERPL-MCNC: 1.6 MG/DL
MCH RBC QN AUTO: 32.8 PG
MCH RBC QN AUTO: 32.9 PG
MCH RBC QN AUTO: 34.9 PG
MCHC RBC AUTO-ENTMCNC: 32.2 G/DL
MCHC RBC AUTO-ENTMCNC: 32.8 G/DL
MCHC RBC AUTO-ENTMCNC: 33.9 G/DL
MCV RBC AUTO: 100 FL
MCV RBC AUTO: 102 FL
MCV RBC AUTO: 103 FL
MONOCYTES # BLD AUTO: 0.1 K/UL
MONOCYTES NFR BLD: 4.2 %
MONOCYTES NFR BLD: 5 %
MONOCYTES NFR BLD: 5.7 %
NEUTROPHILS # BLD AUTO: 0.3 K/UL
NEUTROPHILS # BLD AUTO: 0.4 K/UL
NEUTROPHILS # BLD AUTO: 0.4 K/UL
NEUTROPHILS NFR BLD: 27.8 %
NEUTROPHILS NFR BLD: 27.9 %
NEUTROPHILS NFR BLD: 29.1 %
NRBC BLD-RTO: 0 /100 WBC
NRBC BLD-RTO: 1 /100 WBC
NRBC BLD-RTO: 3 /100 WBC
NUM UNITS TRANS PACKED RBC: NORMAL
NUM UNITS TRANS PACKED RBC: NORMAL
OVALOCYTES BLD QL SMEAR: ABNORMAL
OVALOCYTES BLD QL SMEAR: ABNORMAL
PATH REV BLD -IMP: NORMAL
PATH REV BLD -IMP: NORMAL
PLATELET # BLD AUTO: 21 K/UL
PLATELET # BLD AUTO: 21 K/UL
PLATELET # BLD AUTO: 22 K/UL
PLATELET BLD QL SMEAR: ABNORMAL
PLATELET BLD QL SMEAR: ABNORMAL
PMV BLD AUTO: ABNORMAL FL
POCT GLUCOSE: 100 MG/DL (ref 70–110)
POCT GLUCOSE: 120 MG/DL (ref 70–110)
POCT GLUCOSE: 146 MG/DL (ref 70–110)
POCT GLUCOSE: 96 MG/DL (ref 70–110)
POIKILOCYTOSIS BLD QL SMEAR: SLIGHT
POIKILOCYTOSIS BLD QL SMEAR: SLIGHT
POLYCHROMASIA BLD QL SMEAR: ABNORMAL
POLYCHROMASIA BLD QL SMEAR: ABNORMAL
POTASSIUM SERPL-SCNC: 3.9 MMOL/L
PROT SERPL-MCNC: 5.9 G/DL
RBC # BLD AUTO: 1.69 M/UL
RBC # BLD AUTO: 1.7 M/UL
RBC # BLD AUTO: 2.01 M/UL
RETICS/RBC NFR AUTO: 6.2 %
SATURATED IRON: 85 %
SODIUM SERPL-SCNC: 141 MMOL/L
SPHEROCYTES BLD QL SMEAR: ABNORMAL
TOTAL IRON BINDING CAPACITY: 226 UG/DL
TRANSFERRIN SERPL-MCNC: 153 MG/DL
VIT B12 SERPL-MCNC: 1033 PG/ML
WBC # BLD AUTO: 1.19 K/UL
WBC # BLD AUTO: 1.2 K/UL
WBC # BLD AUTO: 1.4 K/UL

## 2019-03-13 PROCEDURE — 36430 TRANSFUSION BLD/BLD COMPNT: CPT | Mod: HCNC

## 2019-03-13 PROCEDURE — 83010 ASSAY OF HAPTOGLOBIN QUANT: CPT | Mod: HCNC

## 2019-03-13 PROCEDURE — 80053 COMPREHEN METABOLIC PANEL: CPT | Mod: HCNC

## 2019-03-13 PROCEDURE — 85025 COMPLETE CBC W/AUTO DIFF WBC: CPT | Mod: HCNC

## 2019-03-13 PROCEDURE — P9038 RBC IRRADIATED: HCPCS | Mod: HCNC

## 2019-03-13 PROCEDURE — 86920 COMPATIBILITY TEST SPIN: CPT | Mod: HCNC

## 2019-03-13 PROCEDURE — 99223 PR INITIAL HOSPITAL CARE,LEVL III: ICD-10-PCS | Mod: HCNC,AI,, | Performed by: INTERNAL MEDICINE

## 2019-03-13 PROCEDURE — 25000003 PHARM REV CODE 250: Mod: HCNC | Performed by: STUDENT IN AN ORGANIZED HEALTH CARE EDUCATION/TRAINING PROGRAM

## 2019-03-13 PROCEDURE — 82728 ASSAY OF FERRITIN: CPT | Mod: HCNC

## 2019-03-13 PROCEDURE — 83540 ASSAY OF IRON: CPT | Mod: HCNC

## 2019-03-13 PROCEDURE — 83010 ASSAY OF HAPTOGLOBIN QUANT: CPT | Mod: 91,HCNC

## 2019-03-13 PROCEDURE — 36415 COLL VENOUS BLD VENIPUNCTURE: CPT | Mod: HCNC

## 2019-03-13 PROCEDURE — 85045 AUTOMATED RETICULOCYTE COUNT: CPT | Mod: HCNC

## 2019-03-13 PROCEDURE — 82607 VITAMIN B-12: CPT | Mod: HCNC

## 2019-03-13 PROCEDURE — 85060 PATHOLOGIST REVIEW: ICD-10-PCS | Mod: HCNC,,, | Performed by: PATHOLOGY

## 2019-03-13 PROCEDURE — 97530 THERAPEUTIC ACTIVITIES: CPT | Mod: HCNC

## 2019-03-13 PROCEDURE — 86880 COOMBS TEST DIRECT: CPT | Mod: HCNC

## 2019-03-13 PROCEDURE — 83605 ASSAY OF LACTIC ACID: CPT | Mod: HCNC

## 2019-03-13 PROCEDURE — 97161 PT EVAL LOW COMPLEX 20 MIN: CPT | Mod: HCNC

## 2019-03-13 PROCEDURE — 82746 ASSAY OF FOLIC ACID SERUM: CPT | Mod: HCNC

## 2019-03-13 PROCEDURE — 63600175 PHARM REV CODE 636 W HCPCS: Mod: HCNC | Performed by: STUDENT IN AN ORGANIZED HEALTH CARE EDUCATION/TRAINING PROGRAM

## 2019-03-13 PROCEDURE — 20600001 HC STEP DOWN PRIVATE ROOM: Mod: HCNC

## 2019-03-13 PROCEDURE — 83615 LACTATE (LD) (LDH) ENZYME: CPT | Mod: HCNC

## 2019-03-13 PROCEDURE — 99223 1ST HOSP IP/OBS HIGH 75: CPT | Mod: HCNC,AI,, | Performed by: INTERNAL MEDICINE

## 2019-03-13 PROCEDURE — 83735 ASSAY OF MAGNESIUM: CPT | Mod: HCNC

## 2019-03-13 PROCEDURE — 85060 BLOOD SMEAR INTERPRETATION: CPT | Mod: HCNC,,, | Performed by: PATHOLOGY

## 2019-03-13 RX ORDER — CITALOPRAM 20 MG/1
20 TABLET, FILM COATED ORAL DAILY
Status: DISCONTINUED | OUTPATIENT
Start: 2019-03-13 | End: 2019-03-17 | Stop reason: HOSPADM

## 2019-03-13 RX ORDER — INSULIN ASPART 100 [IU]/ML
0-5 INJECTION, SOLUTION INTRAVENOUS; SUBCUTANEOUS
Status: DISCONTINUED | OUTPATIENT
Start: 2019-03-13 | End: 2019-03-17 | Stop reason: HOSPADM

## 2019-03-13 RX ORDER — IBUPROFEN 200 MG
16 TABLET ORAL
Status: DISCONTINUED | OUTPATIENT
Start: 2019-03-13 | End: 2019-03-17 | Stop reason: HOSPADM

## 2019-03-13 RX ORDER — ASPIRIN 81 MG/1
81 TABLET ORAL DAILY
Status: DISCONTINUED | OUTPATIENT
Start: 2019-03-13 | End: 2019-03-13

## 2019-03-13 RX ORDER — GLUCAGON 1 MG
1 KIT INJECTION
Status: DISCONTINUED | OUTPATIENT
Start: 2019-03-13 | End: 2019-03-17 | Stop reason: HOSPADM

## 2019-03-13 RX ORDER — SODIUM CHLORIDE 0.9 % (FLUSH) 0.9 %
5 SYRINGE (ML) INJECTION
Status: DISCONTINUED | OUTPATIENT
Start: 2019-03-13 | End: 2019-03-17 | Stop reason: HOSPADM

## 2019-03-13 RX ORDER — PREDNISONE 5 MG/1
5 TABLET ORAL DAILY
Status: DISCONTINUED | OUTPATIENT
Start: 2019-03-13 | End: 2019-03-17 | Stop reason: HOSPADM

## 2019-03-13 RX ORDER — IBUPROFEN 200 MG
24 TABLET ORAL
Status: DISCONTINUED | OUTPATIENT
Start: 2019-03-13 | End: 2019-03-17 | Stop reason: HOSPADM

## 2019-03-13 RX ORDER — ONDANSETRON 8 MG/1
8 TABLET, ORALLY DISINTEGRATING ORAL EVERY 8 HOURS PRN
Status: DISCONTINUED | OUTPATIENT
Start: 2019-03-13 | End: 2019-03-17 | Stop reason: HOSPADM

## 2019-03-13 RX ORDER — ACETAMINOPHEN 325 MG/1
650 TABLET ORAL EVERY 4 HOURS PRN
Status: DISCONTINUED | OUTPATIENT
Start: 2019-03-13 | End: 2019-03-17 | Stop reason: HOSPADM

## 2019-03-13 RX ORDER — PANTOPRAZOLE SODIUM 40 MG/1
40 TABLET, DELAYED RELEASE ORAL DAILY
Status: DISCONTINUED | OUTPATIENT
Start: 2019-03-13 | End: 2019-03-17 | Stop reason: HOSPADM

## 2019-03-13 RX ORDER — HYDROCODONE BITARTRATE AND ACETAMINOPHEN 500; 5 MG/1; MG/1
TABLET ORAL
Status: DISCONTINUED | OUTPATIENT
Start: 2019-03-13 | End: 2019-03-14

## 2019-03-13 RX ORDER — DIPHENHYDRAMINE HCL 25 MG
25 CAPSULE ORAL DAILY PRN
Status: DISCONTINUED | OUTPATIENT
Start: 2019-03-13 | End: 2019-03-17 | Stop reason: HOSPADM

## 2019-03-13 RX ORDER — ACETAMINOPHEN 325 MG/1
650 TABLET ORAL EVERY 4 HOURS PRN
Status: DISCONTINUED | OUTPATIENT
Start: 2019-03-13 | End: 2019-03-13

## 2019-03-13 RX ORDER — RAMELTEON 8 MG/1
8 TABLET ORAL ONCE
Status: COMPLETED | OUTPATIENT
Start: 2019-03-13 | End: 2019-03-13

## 2019-03-13 RX ORDER — LISINOPRIL AND HYDROCHLOROTHIAZIDE 10; 12.5 MG/1; MG/1
1 TABLET ORAL DAILY
Status: DISCONTINUED | OUTPATIENT
Start: 2019-03-13 | End: 2019-03-16

## 2019-03-13 RX ADMIN — PREDNISONE 5 MG: 5 TABLET ORAL at 09:03

## 2019-03-13 RX ADMIN — CITALOPRAM HYDROBROMIDE 20 MG: 20 TABLET ORAL at 09:03

## 2019-03-13 RX ADMIN — LISINOPRIL AND HYDROCHLOROTHIAZIDE 1 TABLET: 12.5; 1 TABLET ORAL at 09:03

## 2019-03-13 RX ADMIN — ASPIRIN 81 MG: 81 TABLET, COATED ORAL at 09:03

## 2019-03-13 RX ADMIN — RAMELTEON 8 MG: 8 TABLET, FILM COATED ORAL at 11:03

## 2019-03-13 RX ADMIN — PANTOPRAZOLE SODIUM 40 MG: 40 TABLET, DELAYED RELEASE ORAL at 09:03

## 2019-03-13 NOTE — PLAN OF CARE
Problem: Adult Inpatient Plan of Care  Goal: Plan of Care Review  Awake, alert and oriented to person, place, time and situation.  Tolerated 1u prbc today without difficulty.  Patient continues to have moderate to severe shortness of breath during ADLs but is comfortable when at rest.  Oxygen weaned to 2L - sats 93-96% - will continue to wean today although patient is asking to take it slow due to SOB.  Accuchecks ACHS have been 100-120.  Patient has no c/o pain, nausea, or other.  Stool is loose, which patient states is normal for her - only had 1 episode this shift, will continue to monitor.  Patient understands she is going to have a BMBx tomorrow.

## 2019-03-13 NOTE — ASSESSMENT & PLAN NOTE
--on Metformin at home  --Hyperglycemia in the 290's on presentation  --POCT glucose with low dose SSI  --consider basal insulin

## 2019-03-13 NOTE — ASSESSMENT & PLAN NOTE
69 yo female with MDS, pancytopenia requiring routine PRBC transfusion presents to the ER with acute hypoxic respiratory failure and episode of unresponsiveness initially concerning for stroke. However, pt gained consciousness and now at baseline mental status with out medical intervention. Unclear etiology for sob/unresponsiveness. CTH negative for bleeding/acute process. Pt evaluated by vascular neurology, symptoms and findings not consistent with CVA.     --presentation most likely related to transfusion, possible delayed transfusion reaction. Chest xray with out evidence of volume over load or injury to suggest TACO vs TRALI  --Mild elevation of Troponin, BNP likely demand ischemia 2/2 anemia  --currently on room air, remains asymptomatic. Continue supportive care

## 2019-03-13 NOTE — ED NOTES
Bed: [facility]  Expected date:   Expected time:   Means of arrival:   Comments:

## 2019-03-13 NOTE — CONSULTS
[facility]  Vascular Neurology  Comprehensive Stroke Center  Consult Note    Inpatient consult to Vascular (Stroke) Neurology  Consult performed by: Patience Hastings NP  Consult ordered by: Adama Valverde III, MD  Reason for consult: decreased LOC         Assessment/Plan:     Patient is a 68 y.o. year old female with:    Episode of unresponsiveness    Patient with episode of unresponsiveness this afternoon around 18:30. EMS was called by family. Patient presented to the ED unresponsiveness and not arousable to sternal rub. Code stroke was called overhead. When patient presented to the ED she was also desatting in 60's. O2 sats elizabeth when non-rebreather applied and initial BP was 236/102.    On neuro exam patient oriented X 4, moving all extremities against resistance, no gaze preference, and dysarthria/aphasia. At this time signs and symptoms to not correlate with vascular distrubution injury. No further neurological workup necessary at this time.     We will sign off. Continue care as needed for current medical symptoms. Please call with any questions or concerns.              STROKE DOCUMENTATION     Acute Stroke Times   Last Known Normal Date: 03/12/19  Last Known Normal Time: 1842  Symptom Onset Date: 03/12/19  Symptom Onset Time: 1842  Stroke Team Called Date: 03/12/19  Stroke Team Called Time: 1941  Stroke Team Arrival Date: 03/12/19  Stroke Team Arrival Time: 1942  CT Interpretation Time: 2000  Decision to Treat Time for Alteplase: (NA)  Decision to Treat Time for IR: (NA)    NIH Scale:  Interval: baseline  1a. Level of Consciousness: 0-->Alert, keenly responsive  1b. LOC Questions: 0-->Answers both questions correctly  1c. LOC Commands: 0-->Performs both tasks correctly  2. Best Gaze: 0-->Normal  3. Visual: 0-->No visual loss  4. Facial Palsy: 0-->Normal symmetrical movements  5a. Motor Arm, Left: 0-->No drift, limb holds 90 (or 45) degrees for full 10 secs  5b. Motor Arm, Right: 0-->No  drift, limb holds 90 (or 45) degrees for full 10 secs  6a. Motor Leg, Left: 0-->No drift, leg holds 30 degree position for full 5 secs  6b. Motor Leg, Right: 0-->No drift, leg holds 30 degree position for full 5 secs  7. Limb Ataxia: 0-->Absent  8. Sensory: 0-->Normal, no sensory loss  9. Best Language: 0-->No aphasia, normal  10. Dysarthria: 0-->Normal  11. Extinction and Inattention (formerly Neglect): 0-->No abnormality  Total (NIH Stroke Scale): 0    Modified Sumi Score: 1  Madison Coma Scale:    ABCD2 Score:    [id number] Score:   HAS -BLED Score:   ICH Score:   Hunt & Clemens Classification:       Thrombolysis Candidate? No, Strong suspicion for stroke mimic or alternative diagnosis       Interventional Revascularization Candidate?   Is the patient eligible for mechanical endovascular reperfusion (CARLOS)?  No; No large vessel occlusion      Hemorrhagic change of an Ischemic Stroke: Does this patient have an ischemic stroke with hemorrhagic changes? No     Subjective:     History of Present Illness:  68 year old female with past medical hx of DM II, HTN, and myelodysplastic syndrome presented to the ED vis EMS after family called 911 due to patient being non-responsive. Today she went for 1 unit of PRBC at a clinic. She came home and was her normal self. This evening around 18:30 she was sitting in her chair when she became non-responsive and unable to arouse per family report. On arrival to ED patient continued to be non-responsive and no arousal with sternal rub. Code stroke paged.     All hx was obtained via chart review and medical staff. No family present at bedside.         Past Medical History:   Diagnosis Date    Allergic rhinitis     Allergy     Anemia     Anxiety     CAD (coronary artery disease)     Carotid stenosis     Controlled type 2 diabetes mellitus without complication, without long-term current use of insulin 10/19/2016    Depression     GERD (gastroesophageal reflux disease)      Hearing loss in right ear     Hx of psychiatric care     Celexa, Prozac    MDS (myelodysplastic syndrome) with 5q deletion     Psychiatric problem     Therapy      Past Surgical History:   Procedure Laterality Date    Biopsy-bone marrow Left 2018    [facility] 2ND F[facility]    BIOPSY-BONE MARROW Left 2017    [facility]ades [facility] at [facility] OR 2ND FLR    BIOPSY-BONE MARROW N/A 2017    [facility] at [facility] OR 2ND FLR    BUNIONECTOMY      CAROTID ENDARTERECTOMY Left     CAROTID STENT      COLONOSCOPY N/A 2016    Performed by PATRICIA Simpson MD at [facility] ENDO (4TH FLR)    ENDOMETRIAL ABLATION      for endometriosis    TONSILLECTOMY      TYMPANOSTOMY TUBE PLACEMENT       Family History   Problem Relation Age of Onset    Heart disease Mother     Hyperlipidemia Mother     Heart disease Father     Alcohol abuse Father     Cancer Paternal Grandmother         smoker; lung?    Alcohol abuse Brother     Colon cancer Neg Hx     Breast cancer Neg Hx      Social History     Tobacco Use    Smoking status: Former Smoker     Packs/day: 0.50     Years: 36.00     Pack years: 18.00     Types: Cigarettes, Vaping with nicotine     Last attempt to quit: 3/3/2017     Years since quittin.0    Smokeless tobacco: Never Used   Substance Use Topics    Alcohol use: No     Comment: rare, social    Drug use: No     Review of patient's allergies indicates:   Allergen Reactions    Revlimid [lenalidomide] Swelling     Facial swelling  17 - in the process of desensitation       Medications: I have reviewed the current medication administration record.      (Not in a hospital admission)    Review of Systems   Constitutional: Negative for fever.   Eyes: Negative for visual disturbance.   Respiratory: Positive for shortness of breath.    Cardiovascular: Negative for chest pain.   Gastrointestinal: Negative for nausea and vomiting.   Genitourinary: Negative for  urgency.   Skin: Positive for pallor.   Neurological: Positive for weakness (generalized ). Negative for facial asymmetry and headaches.   Psychiatric/Behavioral: Negative for agitation and confusion.        Unresponsive      Objective:     Vital Signs (Most Recent):  Pulse: 93 (03/12/19 2028)  BP: (!) 187/73 (03/12/19 2006)  SpO2: 100 % (03/12/19 2028)    Vital Signs Range (Last 24H):  Temp:  [98.7 °F (37.1 °C)-99.1 °F (37.3 °C)]   Pulse:  []   Resp:  [18]   BP: (148-236)/()   SpO2:  [94 %-100 %]     Physical Exam   Constitutional: She is oriented to person, place, and time. She appears well-developed.   HENT:   Head: Normocephalic and atraumatic.   Eyes: EOM are normal. Pupils are equal, round, and reactive to light.   Neck: Normal range of motion.   Cardiovascular: Normal rate.   Pulmonary/Chest: She is in respiratory distress.   Non-rebreather    Abdominal: Soft.   Musculoskeletal: Normal range of motion.   Neurological: She is alert and oriented to person, place, and time.   Skin: Skin is warm. Capillary refill takes less than 2 seconds.   Psychiatric: She has a normal mood and affect.   Vitals reviewed.      Neurological Exam:   LOC: alert  Attention Span: Good   Language: No aphasia  Articulation: No dysarthria  Orientation: Person, Place, Time   Visual Fields: Full  EOM (CN III, IV, VI): Full/intact  Pupils (CN II, III): PERRL  Facial Movement (CN VII): Symmetric facial expression    Motor: Arm left  Normal 5/5  Leg left  Normal 5/5  Arm right  Normal 5/5  Leg right Normal 5/5  Cebellar: No evidence of appendicular or axial ataxia  Sensation: Intact to light touch, temperature and vibration      Laboratory:  CMP:   Recent Labs   Lab 03/12/19  1415   CALCIUM 9.5   ALBUMIN 3.6   PROT 6.8      K 3.9   CO2 23      BUN 17   CREATININE 1.1   ALKPHOS 76   *   AST 32   BILITOT 1.4*     CBC:   Recent Labs   Lab 03/12/19 1951   WBC 5.86   RBC 2.06*   HGB 7.0*   HCT 22.6*   PLT 41*   MCV  110*   MCH 34.0*   MCHC 31.0*     Lipid Panel: No results for input(s): CHOL, LDLCALC, HDL, TRIG in the last 168 hours.  Coagulation:   Recent Labs   Lab 03/12/19 1951   INR 1.1     Hgb A1C: No results for input(s): HGBA1C in the last 168 hours.  TSH: No results for input(s): TSH in the last 168 hours.    Diagnostic Results:      Brain imaging:  CT head 3/12    1. Examination limited by patient motion artifact.  2. No hemorrhage or major vascular distribution infarct.  3. Chronic microvascular ischemic change.  4. Probable small right falcine densely calcified meningioma.  5.  Probable left maxillary retention cyst.  Right mastoid effusion.          Patience Hastings NP  Comprehensive Stroke Center  Department of Vascular Neurology   [facility]

## 2019-03-13 NOTE — CARE UPDATE
Patient admitted to BMT service this am from ED. She is now AAOx3, remains sob with exertion. Currently on O2 at 3L NC. Patient is severely anemic this am with a hgb of 5.6 gm/dl, repeat 5.9 gm/dl. Transfused 1 unit PRBC. Anemia work-up for hemolysis and nutritional deficiencies. B12, folic acid wnl, ferritin and iron elevated however patient had just completed PRBC. retic count elevated but haptooglobin wnl. Patient previously scheduled for outpatient bone marrow biopsy with sedation. Will rescheduled til 3/21. Monitor for fluid overload as chest x-ray shows pulmonary edema.

## 2019-03-13 NOTE — HOSPITAL COURSE
3/12-3/13  Pt admitted to BMT service for sob/unresponsive episode presumptively from transfusion reaction. CT head was negative for acute changes. CXR showed bilateral diffuse interstitial coarsening suggesting pulmonary edema. She was initially placed on non-rebreather than de-escalated to NC. Mental status had improved to baseline upon evaluation in ED. She was noted to be pancytopenic with Hgb 5.6, WBC 1.2, and plts 21. She was given 1 unit pRBC. Smear showed occasional spherocytes, but labs (normal haptoglobin, low LDH) were not suggestive of hemolysis, though reticulocytes were elevated to 6.2%. It was suspected that patient's profound anemia contributed to syncopal episode prior to admit.    03/14/2019   O2 titrated down to 1 L then room air with O2 sat at 92%. Patient still complained of dyspnea with exertion. Hgb back down to 6 after transfusion yesterday so another unit or pRBC ordered. Hemo-occult stool test sent to rule out GI bleed though patient not having any melena or hematochezia. Decision regarding early bone marrow biopsy pending.    03/15/2019   Patient complaining of dyspnea with exertion. Hemo-occult test was negative. Hgb 7.1 this morning. Additional 2 units of pRBC ordered in additional to IV lasix 40 mg x 1. Checking 6-minute walk test    03/16/2019   Hgb 9.1 this morning. Net negative 1 L after lasix. Patient denies any dyspnea with exertion. Passed repeat 6-minute walk test. Anticipate d/c today or early tomorrow     03/17/2019   BP elevated to 163/78 this morning so lisinopril-HCTZ dose was doubled (which is her home dose). Patient reports improvement in fatigue and resp symptoms. Hgb 10.1. Patient was advised to stop Revlimid under she follow-up with hematologist (Dr. Valdes) outpatient. She was discharged home in good condition.

## 2019-03-13 NOTE — ED NOTES
PT NON-RESPONSIVE ON ARRIVAL; ED MD AT BEDSIDE. PT NOT FOLLOWING COMMANDS. PT NOT AROUSING TO STERNAL RUB.    Patient placed on continuous cardiac monitor, automatic blood pressure cuff and continuous pulse oximeter.

## 2019-03-13 NOTE — ANESTHESIA PREPROCEDURE EVALUATION
03/13/2019  Susan Puente is a 68 y.o., female.  Pre-operative evaluation for Procedure(s) (LRB):  Biopsy-bone marrow (Left)    Susan Puente is a 68 y.o. female with MDS diagnosed in 2016, BMBX,6/8/17, had normal cytogenetics. Repeat marrow from 6/7/18 shows relapse, history of GERD, CAD s/p PCI in [facility] in 2005 (3 stents in [facility]), B/L carotid artery dz 50-60%, HTN, dyslipidemia, DM I, CKD3 who presents for the above procedure    Patient presented in hypoxic respiratory failure after having SOB and losing consciousness, regained without medical interventuon, back at baseline on room air, not completely clear why loss of consciousness at home, CTH with stable meningioma, and CXR with some pulm edema.    Patient has pancytopenia 2/2 to chemo. Hgb today 5.6, plts 21 down from 7 and 41 on admission.    LDA:   L AC 18G PIV  R AC 20G PIV    Prev airway: None documented    Drips: None    Patient Active Problem List   Diagnosis    Controlled type 2 diabetes mellitus with stage 3 chronic kidney disease, without long-term current use of insulin    CAD (coronary artery disease)    Bilateral carotid artery disease    Hearing loss in right ear    Anemia requiring transfusions    Macrocytic anemia    Hypercalcemia    Essential hypertension    Calcification of aorta    Stage 3 chronic kidney disease    Myelodysplastic syndrome    MDS (myelodysplastic syndrome) with 5q deletion    Adjustment disorder with mixed anxiety and depressed mood    MDS (myelodysplastic syndrome)    Pancytopenia    Acute hypoxemic respiratory failure       Review of patient's allergies indicates:   Allergen Reactions    Revlimid [lenalidomide] Swelling     Facial swelling  6/8/17 - in the process of desensitation        Current Facility-Administered Medications on File Prior to Encounter   Medication Dose Route Frequency  Provider Last Rate Last Dose    0.9%  NaCl infusion (for blood administration)   Intravenous Once Jacinda Ramires MD         Current Outpatient Medications on File Prior to Encounter   Medication Sig Dispense Refill    acetaminophen (TYLENOL ARTHRITIS PAIN) 650 MG TbSR Take 650 mg by mouth every 8 (eight) hours as needed (for pain).      alirocumab (PRALUENT PEN) 75 mg/mL PnIj Inject 1 mL (75 mg total) into the skin every 14 (fourteen) days. 6 Syringe 3    aspirin (ECOTRIN) 81 MG EC tablet Take 81 mg by mouth once daily.      b complex vitamins capsule Take 1 capsule by mouth once daily.      blood sugar diagnostic Strp 1 each by Misc.(Non-Drug; Combo Route) route once daily. 30 strip 11    blood-glucose meter kit Use as instructed 1 each 0    CETIRIZINE HCL (ZYRTEC ORAL) Take by mouth.      citalopram (CELEXA) 20 MG tablet Take 1 tablet (20 mg total) by mouth once daily. 30 tablet 2    citalopram (CELEXA) 20 MG tablet TAKE ONE TABLET BY MOUTH EVERY DAY 30 tablet 2    fexofenadine 30 mg TbDL Take by mouth.      fish oil-omega-3 fatty acids 300-1,000 mg capsule Take 4 g by mouth nightly.       lancets (FREESTYLE LANCETS) 28 gauge Misc 1 lancet by Misc.(Non-Drug; Combo Route) route once daily. 30 each 11    lenalidomide (REVLIMID) 10 mg Cap Take 10 mg by mouth once daily auth # [id number] on 2/19/19. 28 each 0    magnesium 30 mg Tab Take by mouth once.      metFORMIN (GLUCOPHAGE-XR) 500 MG 24 hr tablet Take 1 tablet (500 mg total) by mouth daily with breakfast. 90 tablet 3    NICOTROL 10 mg Crtg INHALE ONE PUFF INTO THE MOUTH AS NEEDED MAXIMUM 6 CARTRIDGES/DAY. 168 each 0    pantoprazole (PROTONIX) 40 MG tablet Take 1 tablet (40 mg total) by mouth once daily. 30 tablet 1    potassium 99 mg Tab Take by mouth once.      predniSONE (DELTASONE) 5 MG tablet Take 2 tablets by mouth as directed by MD in clinic. 60 tablet 0    TRUE METRIX GLUCOSE METER Misc       walker Misc 1 each by Misc.(Non-Drug;  Combo Route) route once daily at 6am. 1 each 0       Past Surgical History:   Procedure Laterality Date    Biopsy-bone marrow Left 2018    Per[facility] at [facility] OR 2ND FLR    BIOPSY-BONE MARROW Left 2017    Performed [facility] at [facility] OR 2ND FLR    BIOPSY-BONE MARROW N/A 2017    [facility] at [facility] OR 2ND FLR    BUNIONECTOMY      CAROTID ENDARTERECTOMY Left 2011    CAROTID STENT      COLONOSCOPY N/A 2016    Performed by PATRICIA Simpson MD at [facility] ENDO (4TH FLR)    ENDOMETRIAL ABLATION      for endometriosis    TONSILLECTOMY      TYMPANOSTOMY TUBE PLACEMENT         Social History     Socioeconomic History    Marital status: Single     Spouse name: Not on file    Number of children: Not on file    Years of education: Not on file    Highest education level: Not on file   Social Needs    Financial resource strain: Not on file    Food insecurity - worry: Not on file    Food insecurity - inability: Not on file    Transportation needs - medical: Not on file    Transportation needs - non-medical: Not on file   Occupational History    Not on file   Tobacco Use    Smoking status: Former Smoker     Packs/day: 0.50     Years: 36.00     Pack years: 18.00     Types: Cigarettes, Vaping with nicotine     Last attempt to quit: 3/3/2017     Years since quittin.0    Smokeless tobacco: Never Used   Substance and Sexual Activity    Alcohol use: No     Comment: rare, social    Drug use: No    Sexual activity: Not on file   Other Topics Concern    Patient feels they ought to cut down on drinking/drug use No    Patient annoyed by others criticizing their drinking/drug use No    Patient has felt bad or guilty about drinking/drug use No    Patient has had a drink/used drugs as an eye opener in the AM No   Social History Narrative    Single, never , no children, former , [employer]         Vital Signs Range (Last 24H):  Temp:   [36.7 °C (98 °F)-36.8 °C (98.2 °F)]   Pulse:  []   Resp:  [19-40]   BP: (115-236)/()   SpO2:  [94 %-100 %]       CBC:   Recent Labs     03/12/19 1951 03/13/19 0442   WBC 5.86 1.19*   RBC 2.06* 1.70*   HGB 7.0* 5.6*   HCT 22.6* 17.4*   PLT 41* 21*   * 102*   MCH 34.0* 32.9*   MCHC 31.0* 32.2       CMP:   Recent Labs     03/12/19 1951 03/13/19 0442    141   K 3.4* 3.9    108   CO2 12* 25   BUN 19 24*   CREATININE 1.3 1.0   * 116*   MG  --  1.6   CALCIUM 9.2 8.8   ALBUMIN 3.6 3.1*   PROT 6.8 5.9*   ALKPHOS 78 68   * 97*   AST 41* 45*   BILITOT 3.1* 1.4*       INR  Recent Labs     03/12/19 1951   INR 1.1           Diagnostic Studies: CXR  Radiographic findings suggesting sequela of pulmonary edema/CHF pattern, with interstitial pneumonia not excluded.  No focal consolidation.      Electronically signed by: Con Coon MD  Date: 03/12/2019  Time: 20:43      NM stress  Impression: NORMAL MYOCARDIAL PERFUSION  1. The perfusion scan is free of evidence for myocardial ischemia or injury.   2. Resting wall motion is physiologic.   3. Visually estimated LV function is normal.   4. The ventricular volumes are normal at rest and stress.   5. The extracardiac distribution of radioactivity is normal.   6. There was no previous study available to compare      EKG:  Sinus tach 103  Possible L atrial enlargement  Possible inferior subendocardial injury  Similar to 11/2016    2D Echo:  None      Anesthesia Evaluation    I have reviewed the Patient Summary Reports.    I have reviewed the Nursing Notes.   I have reviewed the Medications.     Review of Systems  Anesthesia Hx:  History of prior surgery of interest to airway management or planning:  Denies Personal Hx of Anesthesia complications.   Hematology/Oncology:        Current/Recent Cancer. (MDS)   Cardiovascular:   Hypertension CAD  CABG/stent     Pulmonary:   Shortness of breath    Renal/:   Chronic Renal Disease     Hepatic/GI:   GERD    Neurological:   Previous LOC   Endocrine:   Diabetes, type 2    Psych:   Psychiatric History depression          Physical Exam  General:  Obesity    Airway/Jaw/Neck:  Airway Findings: Mouth Opening: Normal Tongue: Normal  General Airway Assessment: Adult  Mallampati: II  Improves to I with phonation.  TM Distance: Normal, at least 6 cm      Dental:  Dental Findings: In tact    Chest/Lungs:  Chest/Lungs Findings: Clear to auscultation, Normal Respiratory Rate     Heart/Vascular:  Heart Findings: Rate: Normal  Rhythm: Regular Rhythm  Sounds: Normal        Mental Status:  Mental Status Findings:  Cooperative, Alert and Oriented         Anesthesia Plan  Type of Anesthesia, risks & benefits discussed:  Anesthesia Type:  general  Patient's Preference:   Intra-op Monitoring Plan: standard ASA monitors  Intra-op Monitoring Plan Comments:   Post Op Pain Control Plan: multimodal analgesia and per primary service following discharge from PACU  Post Op Pain Control Plan Comments:   Induction:   IV  Beta Blocker:  Patient is not currently on a Beta-Blocker (No further documentation required).       Informed Consent: Patient understands risks and agrees with Anesthesia plan.  Questions answered. Anesthesia consent signed with patient.  ASA Score: 2     Day of Surgery Review of History & Physical:    H&P update referred to the surgeon.         Ready For Surgery From Anesthesia Perspective.

## 2019-03-13 NOTE — H&P
[facility]  Hematology  Bone Marrow Transplant  H&P    Subjective:     Principal Problem: Acute hypoxemic respiratory failure    HPI: Ms. Puente is 69 yo female with MDS diagnosed in 2016, BMBX,6/8/17, had normal cytogenetics. Repeat marrow from 6/7/18 shows relapsed 5q minus currently on Revlimid 10 mg daily also, history of CAD s/p PCI in [facility] in 2005 [facility]), HTN, dyslipidemia, DM II. Patient has a history of pancytopenia thought to be from Revlimid and gets intermittent PRBC transfusions. Today pt presents to the ER after an episode of unresponsiveness and shortness of breath after she received a unit of PRBC as out patient for Hb of 5.6 mg/dl . Pt states she has never had transfusion reactions in the past but states this time approximately an hour post transfusion she become clammy and sob at home. EMS was called by neighbors after pt was reportedly unresponsive. In the ED stroke code was activated and vascular surgery evaluated pt. CTH was negative for bleed or stroke, showed small calcified meningioma with out mass effect c/w findings in 2005. Pt later gained consciousness and was at baseline mental status with out intervention.     In the Ed pt had Hb of 7. Chest xray likely with mild pulmonary edema with mild elevation of BNP in the 600's. Pt was on room air when seen in the Ed. In no acute distress. Denies sob, chest pain, fever, chills, n/v, urinary symptoms.         Patient information was obtained from patient and ER records.       Facility-Administered Medications Prior to Admission   Medication Dose Route Frequency Provider Last Rate Last Dose    0.9%  NaCl infusion (for blood administration)   Intravenous Q24H PRN Jacinda Ramires MD        acetaminophen tablet 650 mg  650 mg Oral 1 time in Clinic/HOD Gita Valdes MD         Medications Prior to Admission   Medication Sig Dispense Refill Last Dose    acetaminophen (TYLENOL ARTHRITIS  PAIN) 650 MG TbSR Take 650 mg by mouth every 8 (eight) hours as needed (for pain).   3/12/2019    alirocumab (PRALUENT PEN) 75 mg/mL PnIj Inject 1 mL (75 mg total) into the skin every 14 (fourteen) days. 6 Syringe 3 3/12/2019    aspirin (ECOTRIN) 81 MG EC tablet Take 81 mg by mouth once daily.   3/12/2019    b complex vitamins capsule Take 1 capsule by mouth once daily.   3/12/2019    blood sugar diagnostic Strp 1 each by Misc.(Non-Drug; Combo Route) route once daily. 30 strip 11 3/12/2019    blood-glucose meter kit Use as instructed 1 each 0 3/12/2019    CETIRIZINE HCL (ZYRTEC ORAL) Take by mouth.   3/12/2019    citalopram (CELEXA) 20 MG tablet Take 1 tablet (20 mg total) by mouth once daily. 30 tablet 2 3/12/2019    citalopram (CELEXA) 20 MG tablet TAKE ONE TABLET BY MOUTH EVERY DAY 30 tablet 2 3/12/2019    fexofenadine 30 mg TbDL Take by mouth.   3/12/2019    fish oil-omega-3 fatty acids 300-1,000 mg capsule Take 4 g by mouth nightly.    3/12/2019    lancets (FREESTYLE LANCETS) 28 gauge Misc 1 lancet by Misc.(Non-Drug; Combo Route) route once daily. 30 each 11 3/12/2019    lenalidomide (REVLIMID) 10 mg Cap Take 10 mg by mouth once daily auth # [id number] on 2/19/19. 28 each 0 3/12/2019    lisinopril-hydrochlorothiazide (PRINZIDE,ZESTORETIC) 20-12.5 mg per tablet TAKE 2 TABLETS EVERY  tablet 0 Past Week    magnesium 30 mg Tab Take by mouth once.   3/12/2019    metFORMIN (GLUCOPHAGE-XR) 500 MG 24 hr tablet Take 1 tablet (500 mg total) by mouth daily with breakfast. 90 tablet 3 3/12/2019    NICOTROL 10 mg Crtg INHALE ONE PUFF INTO THE MOUTH AS NEEDED MAXIMUM 6 CARTRIDGES/DAY. 168 each 0 3/12/2019    pantoprazole (PROTONIX) 40 MG tablet Take 1 tablet (40 mg total) by mouth once daily. 30 tablet 1 3/12/2019    potassium 99 mg Tab Take by mouth once.   3/12/2019    predniSONE (DELTASONE) 5 MG tablet Take 2 tablets by mouth as directed by MD in clinic. 60 tablet 0 3/12/2019    TRUE METRIX GLUCOSE  METER Misc    3/12/2019    walker Misc 1 each by Misc.(Non-Drug; Combo Route) route once daily at 6am. 1 each 0 3/12/2019       Revlimid [lenalidomide]     Past Medical History:   Diagnosis Date    Allergic rhinitis     Allergy     Anemia     Anxiety     CAD (coronary artery disease)     Carotid stenosis     Controlled type 2 diabetes mellitus without complication, without long-term current use of insulin 10/19/2016    Depression     GERD (gastroesophageal reflux disease)     Hearing loss in right ear     Hx of psychiatric care     Celexa, Prozac    MDS (myelodysplastic syndrome) with 5q deletion     Psychiatric problem     Therapy      Past Surgical History:   Procedure Laterality Date    Biopsy-bone marrow Left 2018    [facility] 2ND FLR    BIOPSY-BONE MARROW Left 2017    Performed by [facility] at [facility] OR 2ND FLR    BIOPSY-BONE MARROW N/A 2017    Performed by [facility] at [facility] OR 2ND FLR    BUNIONECTOMY      CAROTID ENDARTERECTOMY Left     CAROTID STENT      COLONOSCOPY N/A 2016    Performed by PATRICIA Simpson MD at [facility] ENDO (4TH FLR)    ENDOMETRIAL ABLATION      for endometriosis    TONSILLECTOMY      TYMPANOSTOMY TUBE PLACEMENT       Family History     Problem Relation (Age of Onset)    Alcohol abuse Father, Brother    Cancer Paternal Grandmother    Heart disease Mother, Father    Hyperlipidemia Mother        Tobacco Use    Smoking status: Former Smoker     Packs/day: 0.50     Years: 36.00     Pack years: 18.00     Types: Cigarettes, Vaping with nicotine     Last attempt to quit: 3/3/2017     Years since quittin.0    Smokeless tobacco: Never Used   Substance and Sexual Activity    Alcohol use: No     Comment: rare, social    Drug use: No    Sexual activity: Not on file       Review of Systems   Constitutional: Negative for chills, fatigue and fever.   Respiratory: Negative for cough, chest tightness, shortness of  breath and wheezing.    Cardiovascular: Negative for chest pain, palpitations and leg swelling.   Gastrointestinal: Negative for abdominal distention, abdominal pain, blood in stool, nausea and vomiting.   Genitourinary: Negative for difficulty urinating, dysuria, frequency and urgency.   Musculoskeletal: Negative for back pain, gait problem, joint swelling and myalgias.   Skin: Negative for pallor and rash.   Neurological: Negative for dizziness, tremors, weakness, numbness and headaches.     Objective:     Vital Signs (Most Recent):  Temp: 98 °F (36.7 °C) (03/12/19 2008)  Pulse: 79 (03/13/19 0032)  Resp: 19 (03/12/19 2302)  BP: (!) 152/67 (03/13/19 0032)  SpO2: 100 % (03/13/19 0032) Vital Signs (24h Range):  Temp:  [98 °F (36.7 °C)-99.1 °F (37.3 °C)] 98 °F (36.7 °C)  Pulse:  [] 79  Resp:  [18-40] 19  SpO2:  [94 %-100 %] 100 %  BP: (115-236)/() 152/67        There is no height or weight on file to calculate BMI.  There is no height or weight on file to calculate BSA.    ECOG SCORE         [unfilled]    Lines/Drains/Airways     Peripheral Intravenous Line                 Peripheral IV - Single Lumen 03/12/19 1953 Left Antecubital less than 1 day         Peripheral IV - Single Lumen 03/12/19 1953 Right Antecubital less than 1 day                Physical Exam   Constitutional: She is oriented to person, place, and time. She appears well-developed and well-nourished. No distress.   HENT:   Head: Normocephalic and atraumatic.   Eyes: EOM are normal. Pupils are equal, round, and reactive to light. No scleral icterus.   Conjunctival pallor    Neck: Normal range of motion. Neck supple.   Cardiovascular: Normal rate and regular rhythm.   No murmur heard.  Pulmonary/Chest: Effort normal and breath sounds normal. No respiratory distress. She has no wheezes. She exhibits no tenderness.   Abdominal: Soft. Bowel sounds are normal. She exhibits no distension. There is no tenderness. There is no guarding.    Musculoskeletal: Normal range of motion. She exhibits no edema or deformity.   Neurological: She is alert and oriented to person, place, and time. She has normal reflexes. No cranial nerve deficit.   Skin: Skin is warm and dry. Capillary refill takes less than 2 seconds. No erythema.       Significant Labs:   CBC:   Recent Labs   Lab 03/12/19 1415 03/12/19 1951   WBC 1.67* 5.86   HGB 5.6* 7.0*   HCT 17.4* 22.6*   PLT 28* 41*    and CMP:   Recent Labs   Lab 03/12/19  1415 03/12/19 1951    140   K 3.9 3.4*    104   CO2 23 12*   * 290*   BUN 17 19   CREATININE 1.1 1.3   CALCIUM 9.5 9.2   PROT 6.8 6.8   ALBUMIN 3.6 3.6   BILITOT 1.4* 3.1*   ALKPHOS 76 78   AST 32 41*   * 101*   ANIONGAP 11 24*   EGFRNONAA 51.7* 42.3*       Diagnostic Results:  I have reviewed all pertinent imaging results/findings within the past 24 hours.    Assessment/Plan:     * Acute hypoxemic respiratory failure    69 yo female with MDS, pancytopenia requiring routine PRBC transfusion presents to the ER with acute hypoxic respiratory failure and episode of unresponsiveness initially concerning for stroke. However, pt gained consciousness and now at baseline mental status with out medical intervention. Unclear etiology for sob/unresponsiveness. CTH negative for bleeding/acute process. Pt evaluated by vascular neurology, symptoms and findings not consistent with CVA.     --presentation most likely related to transfusion, possible delayed transfusion reaction. Chest xray with out evidence of volume over load or injury to suggest TACO vs TRALI  --Mild elevation of Troponin, BNP likely demand ischemia 2/2 anemia  --currently on room air, remains asymptomatic. Continue supportive care   ----will check hemolysis labs: has elevated T.jonh 3.1, will check haptoglobin, LDH, direct bienvenido and smear      Pancytopenia    --pancytopenia 2/2 chemo, plt 41- avoid AC   --transfuse as needed/indicated      MDS (myelodysplastic syndrome)  with 5q deletion    --MDS with 5q deletion, currently on Revelimid 10 mg daily - not on formulary- please arrange in the morning for pt self administration of med      Essential hypertension    --BP optimal, goal < 130/80  --continue home ACEi/HCTZ      Anemia requiring transfusions    --Pancytopenia 2/2 chemo  --anemia with Hb 7 on admit s/p a Unit of PRBC  --check CBC frequently q12h for now      CAD (coronary artery disease)    --continue ASA  --On Alirocumab for Hyperlipidemia - check with pharmacy       Controlled type 2 diabetes mellitus with stage 3 chronic kidney disease, without long-term current use of insulin    --on Metformin at home  --Hyperglycemia in the 290's on presentation  --POCT glucose with low dose SSI  --consider basal insulin        VTE Risk Mitigation (From admission, onward)        Ordered     IP VTE HIGH RISK PATIENT  Once      03/13/19 0038        Akhil Gamboa MD  Bone Marrow Transplant  Hematology  [facility]

## 2019-03-13 NOTE — ED PROVIDER NOTES
Encounter Date: 3/12/2019       History     Chief Complaint   Patient presents with    Respiratory Distress     found unresponsive @ home by family; O2 sats in the 60%s on EMS arrival; EMS applied non-rebreather      Susan Puente is a 68 y.o. female with medical issues of:  MDS (myelodysplastic syndrome), 2005-PCI in RCA (3 stents in RCA [facility], Dr ELIZABET Byers), HTN, dyslipidemia, DM, 2011-L-CEA @ [place].     Patient with episode of unresponsiveness this afternoon around 18:30. EMS was called by family.     Patient states that she has been feeling tiered, fatigued, SOB and throbbing headaches for several days. She called her hematologist labs withdrawn showed Hb: 5 she got blood transfusion and was sent home. Soon after she got home she was feeling tired sob then was unsresponsive around 18:30 EMS was called by family.     Patient presented to the ED unresponsiveness and not arousable to sternal rub. Code stroke was called overhead. When patient presented to the ED she was also desatting in 60's. O2 sats elizabeth when non-rebreather applied and initial BP was 236/102.     Patient was initially on non rebreathe. Her mental status gradually improved to full consciousness. Giving history and obeying commands. Hemodynamically stable.            Review of patient's allergies indicates:   Allergen Reactions    Revlimid [lenalidomide] Swelling     Facial swelling  6/8/17 - in the process of desensitation     Past Medical History:   Diagnosis Date    Allergic rhinitis     Allergy     Anemia     Anxiety     CAD (coronary artery disease)     Carotid stenosis     Controlled type 2 diabetes mellitus without complication, without long-term current use of insulin 10/19/2016    Depression     GERD (gastroesophageal reflux disease)     Hearing loss in right ear     Hx of psychiatric care     Celexa, Prozac    MDS (myelodysplastic syndrome) with 5q deletion     Psychiatric problem     Therapy      Past  Surgical History:   Procedure Laterality Date    Biopsy-bone marrow Left 2018    Performed by [facility]des [facility] at [facility] OR 2ND FLR    BIOPSY-BONE MARROW Left 2017    Performed by [facility]des [facility] at [facility] OR 2ND FLR    BIOPSY-BONE MARROW N/A 2017    Performed by [facility] at [facility] OR 2ND FLR    BUNIONECTOMY      CAROTID ENDARTERECTOMY Left     CAROTID STENT      COLONOSCOPY N/A 2016    Performed by PATRIICA Simpson MD at [facility] ENDO (4TH FLR)    ENDOMETRIAL ABLATION      for endometriosis    TONSILLECTOMY      TYMPANOSTOMY TUBE PLACEMENT       Family History   Problem Relation Age of Onset    Heart disease Mother     Hyperlipidemia Mother     Heart disease Father     Alcohol abuse Father     Cancer Paternal Grandmother         smoker; lung?    Alcohol abuse Brother     Colon cancer Neg Hx     Breast cancer Neg Hx      Social History     Tobacco Use    Smoking status: Former Smoker     Packs/day: 0.50     Years: 36.00     Pack years: 18.00     Types: Cigarettes, Vaping with nicotine     Last attempt to quit: 3/3/2017     Years since quittin.0    Smokeless tobacco: Never Used   Substance Use Topics    Alcohol use: No     Comment: rare, social    Drug use: No     Review of Systems   Constitutional: Positive for activity change and fatigue. Negative for chills and fever.   HENT: Negative for sore throat.    Eyes: Negative for photophobia, pain, discharge, redness, itching and visual disturbance.   Respiratory: Positive for shortness of breath.    Cardiovascular: Positive for chest pain and leg swelling.   Gastrointestinal: Positive for diarrhea and nausea. Negative for constipation and vomiting.   Genitourinary: Negative for dysuria and hematuria.   Neurological: Positive for dizziness, syncope, light-headedness and headaches. Negative for seizures.       Physical Exam     Initial Vitals   BP Pulse Resp Temp SpO2   190 19  03/12/19 1940   (!) 163/109 110 (!) 36 98 °F (36.7 °C) (!) 94 %      MAP       --                Physical Exam    Constitutional: She appears well-developed and well-nourished. She is not diaphoretic. No distress.   Eyes: Right eye exhibits no discharge. Left eye exhibits no discharge.   Neck: Neck supple. No JVD present.   Cardiovascular: Exam reveals no gallop and no friction rub.    No murmur heard.  Tachycardia   Pulmonary/Chest: Breath sounds normal. No respiratory distress. She has no wheezes. She has no rhonchi. She has no rales. She exhibits no tenderness.   Prolonged expiration   Abdominal: Soft. Bowel sounds are normal. She exhibits no distension. There is no tenderness.   Musculoskeletal: She exhibits no edema.   Psychiatric: She has a normal mood and affect. Thought content normal.         ED Course   Procedures  Labs Reviewed   CBC W/ AUTO DIFFERENTIAL - Abnormal; Notable for the following components:       Result Value    RBC 2.06 (*)     Hemoglobin 7.0 (*)     Hematocrit 22.6 (*)      (*)     MCH 34.0 (*)     MCHC 31.0 (*)     RDW 29.8 (*)     Platelets 41 (*)     Gran # (ANC) 0.8 (*)     nRBC 8 (*)     Gran% 13.9 (*)     Lymph% 69.6 (*)     Eosinophil% 9.2 (*)     Basophil% 2.4 (*)     Platelet Estimate Decreased (*)     All other components within normal limits   COMPREHENSIVE METABOLIC PANEL - Abnormal; Notable for the following components:    Potassium 3.4 (*)     CO2 12 (*)     Glucose 290 (*)     Total Bilirubin 3.1 (*)     AST 41 (*)      (*)     Anion Gap 24 (*)     eGFR if  48.7 (*)     eGFR if non  42.3 (*)     All other components within normal limits   TROPONIN I - Abnormal; Notable for the following components:    Troponin I 0.054 (*)     All other components within normal limits   B-TYPE NATRIURETIC PEPTIDE - Abnormal; Notable for the following components:     (*)     All other components within normal limits   POCT GLUCOSE - Abnormal;  Notable for the following components:    POCT Glucose 308 (*)     All other components within normal limits   ISTAT PROCEDURE - Abnormal; Notable for the following components:    POC PH 7.237 (*)     POC PCO2 45.1 (*)     POC PO2 167 (*)     POC HCO3 19.2 (*)     POC TCO2 21 (*)     All other components within normal limits   PROTIME-INR   TSH   TYPE & SCREEN   ISTAT PROCEDURE   ISTAT CREATININE     EKG Readings: (Independently Interpreted)   Previous EKG Date: 2016.   Vent. rate 103 BPM Sinus tachycardia. STD inferior leads       Imaging Results          X-Ray Chest AP Portable (Final result)  Result time 03/12/19 20:43:24    Final result by Con Coon MD (03/12/19 20:43:24)                 Impression:      Radiographic findings suggesting sequela of pulmonary edema/CHF pattern, with interstitial pneumonia not excluded.  No focal consolidation.      Electronically signed by: Con Coon MD  Date:    03/12/2019  Time:    20:43             Narrative:    EXAMINATION:  XR CHEST AP PORTABLE    CLINICAL HISTORY:  Hypoxia;    TECHNIQUE:  Single frontal view of the chest was performed.    COMPARISON:  Chest radiograph 11/10/2016 and CT thorax 07/05/2017    FINDINGS:  Monitoring leads overlie the chest.  Large body habitus.    Cardiac silhouette is midline and upper limits of normal in size.  There is prominence of the central pulmonary vasculature with central peribronchial cuffing and bilateral diffuse interstitial coarsening suggesting pulmonary edema/CHF pattern.  Interstitial pneumonia not excluded.  Scattered linear opacities consistent with subsegmental scarring versus atelectasis.  The lungs are otherwise symmetrically well expanded without large consolidation, pleural effusion or pneumothorax.  Upper mediastinal contours are within normal limits noting calcific atherosclerosis of the thoracic aorta.  No acute osseous process seen.  PA and lateral views can be obtained.                               CT Head  Without Contrast (Final result)  Result time 03/12/19 20:25:09    Final result by Clinton Ndiaye MD (03/12/19 20:25:09)                 Impression:      1. Examination limited by patient motion artifact.  2. No hemorrhage or major vascular distribution infarct.  3. Chronic microvascular ischemic change.  4. Probable small right falcine densely calcified meningioma.  5.  Probable left maxillary retention cyst.  Right mastoid effusion.    COMMUNICATION  This critical result was discovered/received at 2005. The critical information above was relayed directly by telephone to DAMON Hastings NP on 03/12/2019 at 2008 by Dr. Godoy on behalf of Dr. Ndiaye.    Electronically signed by resident: Marty Godoy  Date:    03/12/2019  Time:    20:04    Electronically signed by: Clinton Ndiaye MD  Date:    03/12/2019  Time:    20:25             Narrative:    EXAMINATION:  CT HEAD WITHOUT CONTRAST    CLINICAL HISTORY:  Confusion/delirium, altered LOC, unexplained;    TECHNIQUE:  Low dose axial CT images obtained throughout the head without intravenous contrast. Sagittal and coronal reconstructions were performed.  Examination limited by patient motion artifact.    COMPARISON:  None.    FINDINGS:  Intracranial compartment:    Ventricles and sulci are normal in size for age without evidence of hydrocephalus. No extra-axial blood or fluid collections.  Rounded calcification measuring 0.7 cm along the right aspect of the anterior falx is likely extra-axial and may represent a densely calcified falcine meningioma.    Patchy hypoattenuation of the supratentorial white matter in a distribution suggesting chronic microvascular ischemic change.  No parenchymal mass, hemorrhage, edema or major vascular distribution infarct.    Skull/extracranial contents (limited evaluation): No fracture. Lobular mucosal thickening of the left maxillary antrum, probable mucous retention cyst.  Right mastoid effusion.  Left mastoid air cells and remaining  paranasal sinuses are essentially clear.  Orbits and intraorbital contents are unremarkable.                              X-Rays:   Independently Interpreted Readings:   Chest X-Ray: Large body habitus.pulmonary edema/CHF pattern. Interstitial pneumonia not excluded.  Subsegmental scarring versus atelectasis. Calcific atherosclerosis of the thoracic aorta.   Head CT: No hemorrhage or major vascular distribution infarct. Chronic microvascular ischemic change. Small right falcine densely calcified meningioma. Left maxillary retention cyst.  Right mastoid effusion.     Medical Decision Making:   Initial Assessment:    68 y.o. female with medical issues of:  MDS (myelodysplastic syndrome), 2005-PCI in RCA (3 stents in RCA [facility], Dr ELIZABET Byers), HTN, dyslipidemia, DM, 2011-L-CEA @ [place] who presents with AMS and desaturation post transfusion  Differential Diagnosis:   - Post-transfusion reaction: As the patient has H/O transfusion today with sudden deterioration then improving on oxygen  - HF exacerbation: SOB, chest pain before deterioration associated with pul edema on CXR  - COPD exacerbation: Given long-term history of smoking. Desating .Prolonged expiratory phase on auscultation. Improves with oxygen  Clinical Tests:   Lab Tests: Ordered and Reviewed  The following lab test(s) were unremarkable: CBC, CMP, Troponin and BNP  Radiological Study: Ordered and Reviewed  Medical Tests: Ordered and Reviewed  ED Management:  - Supplemental oxygen, CTH, Labs and EKG  - Code stroke was called overhead: No further neurological tests necessary  - Patient is currently fully awake. Weaning off oxygen. Currently on NC  - Dr. Rory Sotomayor contacted the patient will be Admitted with BMT to Obs  Other:   I discussed test(s) with the performing physician.  I have discussed this case with another health care provider.              Attending Attestation:             Attending ED Notes:   I evaluated this patient with the  resident physician and was involved in every aspect of care. I had a face-to-face encounter during which I obtained history and performed a physical examination.     Pt presented from home with unresponsiveness noted by family and confirmed by EMS on their arrival. EMS also reported hypoxia without other signs of respiratory distress. Pt unresponsive on arrival. Stroke Code called. Pt had gradual improvement and started speaking, answering questions, following commands. Determined to not have focal deficits. Head CT negative for concerning findings for CVA. Exact cause of presentation unclear. Delayed transfusion reaction possible. Pt admitted to [facility] for monitoring, further evaluation and management.    I agree with the history, review of systems, physical examination, workup, assessment, and plan as documented by the resident physician.  - Adama Valverde III, M.D. - Attending physician             Clinical Impression:       ICD-10-CM ICD-9-CM   1. Altered level of consciousness R40.4 780.09   2. Acute hypoxemic respiratory failure J96.01 518.81         Disposition:   Disposition: Admitted  Condition: Stable                        Josephine Saldaña MD  Resident  03/12/19 1266       Adama Valverde III, MD  03/19/19 2270

## 2019-03-13 NOTE — ASSESSMENT & PLAN NOTE
69 yo female with MDS, pancytopenia requiring routine PRBC transfusion presents to the ER with acute hypoxic respiratory failure and episode of unresponsiveness initially concerning for stroke. However, pt gained consciousness and now at baseline mental status with out medical intervention. Unclear etiology for sob/unresponsiveness. CTH negative for bleeding/acute process. Pt evaluated by vascular neurology, symptoms and findings not consistent with CVA.     --presentation most likely related to transfusion or mild fluid overload. No evidence of diffuse lung injury (TRALI) or diffuse overload (TACO) on CXR  --Mild elevation of Troponin, BNP likely demand ischemia 2/2 anemia  --labs (normal haptoglobin, LDH, negative direct bienvenido) not suggestive of hemolysis   --currently on room air with some dyspnea with exertion   - No need to diuresis at this time. If evidence with overload with multiple transfusions, will diurese with IV lasix

## 2019-03-13 NOTE — HPI
68 year old female with past medical hx of DM II, HTN, and myelodysplastic syndrome presented to the ED vis EMS after family called 911 due to patient being non-responsive. Today she went for 1 unit of PRBC at a clinic. She came home and was her normal self. This evening around 18:30 she was sitting in her chair when she became non-responsive and unable to arouse per family report. On arrival to ED patient continued to be non-responsive and no arousal with sternal rub. Code stroke paged.     All hx was obtained via chart review and medical staff. No family present at bedside.

## 2019-03-13 NOTE — ASSESSMENT & PLAN NOTE
--Pancytopenia 2/2 chemo  --anemia with Hb 7 on admit s/p a Unit of PRBC  --check CBC frequently q12h for now

## 2019-03-13 NOTE — PHARMACY MED REC
"Admission Medication Reconciliation - Pharmacy Consult Note    The home medication history was taken by Sukhdeep Redman PharmD.  Based on information gathered and subsequent review by the clinical pharmacist, the items below may need attention.     You may go to "Admission" then "Reconcile Home Medications" tabs to review and/or act upon these items.     Potentially problematic discrepancies with current MAR  o Patient IS taking the following which was not ordered upon admit  o Fexofenadine (Allegra) PO QAM   - Our formulary alternative is cetrizine (Zyrtec) - patient said she is willing to take alternative if wish to resume antihistamine    o Patient is taking a drug DIFFERENTLY than how ordered upon admit  o Lisinopril 10mg/HCTZ 12.5mg po daily currently ordered   - Patient takes two lisinopril 20mg/HCTZ 12.5mg tabs po daily (lisinopril 40/HCTZ 25 daily)        Please address this information as you see fit.  Feel free to contact us if you have any questions or require assistance.    Sukhdeep Redman PharmD, BCPS  [id number]                .    .            "

## 2019-03-13 NOTE — ED TRIAGE NOTES
Susan Puente, a 68 y.o. female presents to the ED unresponsive, pale, diaphoretic, labored breathing, tachypneic     Triage note:  Chief Complaint   Patient presents with    Respiratory Distress     found unresponsive @ home by family; O2 sats in the 60%s on EMS arrival; EMS applied non-rebreather      Review of patient's allergies indicates:   Allergen Reactions    Revlimid [lenalidomide] Swelling     Facial swelling  6/8/17 - in the process of desensitation     Past Medical History:   Diagnosis Date    Allergic rhinitis     Allergy     Anemia     Anxiety     CAD (coronary artery disease)     Carotid stenosis     Controlled type 2 diabetes mellitus without complication, without long-term current use of insulin 10/19/2016    Depression     GERD (gastroesophageal reflux disease)     Hearing loss in right ear     Hx of psychiatric care     Celexa, Prozac    MDS (myelodysplastic syndrome) with 5q deletion     Psychiatric problem     Therapy

## 2019-03-13 NOTE — PROGRESS NOTES
Pt transferred from ED and placed in 854 without any complications. AAOx4. Oriented to nurse, room, and call light. Assessment performed. No skin breakdown noted. VSS. 93-94% on 3L O2. Bedside commode placed in pt's room. All questions answered at this time. Will cont to massimo pt.

## 2019-03-13 NOTE — SUBJECTIVE & OBJECTIVE
Past Medical History:   Diagnosis Date    Allergic rhinitis     Allergy     Anemia     Anxiety     CAD (coronary artery disease)     Carotid stenosis     Controlled type 2 diabetes mellitus without complication, without long-term current use of insulin 10/19/2016    Depression     GERD (gastroesophageal reflux disease)     Hearing loss in right ear     Hx of psychiatric care     Celexa, Prozac    MDS (myelodysplastic syndrome) with 5q deletion     Psychiatric problem     Therapy      Past Surgical History:   Procedure Laterality Date    Biopsy-bone marrow Left 2018    Performed by [facility]des MD at [facility] OR 2ND FLR    BIOPSY-BONE MARROW Left 2017    Performed by [facility] at [facility] OR 2ND FLR    BIOPSY-BONE MARROW N/A 2017    Performed by [facility]des [facility] at [facility] OR 2ND FLR    BUNIONECTOMY      CAROTID ENDARTERECTOMY Left     CAROTID STENT      COLONOSCOPY N/A 2016    Performed by PATRICIA Simpson MD at [facility] ENDO (4TH FLR)    ENDOMETRIAL ABLATION      for endometriosis    TONSILLECTOMY      TYMPANOSTOMY TUBE PLACEMENT       Family History   Problem Relation Age of Onset    Heart disease Mother     Hyperlipidemia Mother     Heart disease Father     Alcohol abuse Father     Cancer Paternal Grandmother         smoker; lung?    Alcohol abuse Brother     Colon cancer Neg Hx     Breast cancer Neg Hx      Social History     Tobacco Use    Smoking status: Former Smoker     Packs/day: 0.50     Years: 36.00     Pack years: 18.00     Types: Cigarettes, Vaping with nicotine     Last attempt to quit: 3/3/2017     Years since quittin.0    Smokeless tobacco: Never Used   Substance Use Topics    Alcohol use: No     Comment: rare, social    Drug use: No     Review of patient's allergies indicates:   Allergen Reactions    Revlimid [lenalidomide] Swelling     Facial swelling  17 - in the process of desensitation       Medications: I have reviewed the current  medication administration record.      (Not in a hospital admission)    Review of Systems   Constitutional: Negative for fever.   Eyes: Negative for visual disturbance.   Respiratory: Positive for shortness of breath.    Cardiovascular: Negative for chest pain.   Gastrointestinal: Negative for nausea and vomiting.   Genitourinary: Negative for urgency.   Skin: Positive for pallor.   Neurological: Positive for weakness (generalized ). Negative for facial asymmetry and headaches.   Psychiatric/Behavioral: Negative for agitation and confusion.        Unresponsive      Objective:     Vital Signs (Most Recent):  Pulse: 93 (03/12/19 2028)  BP: (!) 187/73 (03/12/19 2006)  SpO2: 100 % (03/12/19 2028)    Vital Signs Range (Last 24H):  Temp:  [98.7 °F (37.1 °C)-99.1 °F (37.3 °C)]   Pulse:  []   Resp:  [18]   BP: (148-236)/()   SpO2:  [94 %-100 %]     Physical Exam   Constitutional: She is oriented to person, place, and time. She appears well-developed.   HENT:   Head: Normocephalic and atraumatic.   Eyes: EOM are normal. Pupils are equal, round, and reactive to light.   Neck: Normal range of motion.   Cardiovascular: Normal rate.   Pulmonary/Chest: She is in respiratory distress.   Non-rebreather    Abdominal: Soft.   Musculoskeletal: Normal range of motion.   Neurological: She is alert and oriented to person, place, and time.   Skin: Skin is warm. Capillary refill takes less than 2 seconds.   Psychiatric: She has a normal mood and affect.   Vitals reviewed.      Neurological Exam:   LOC: alert  Attention Span: Good   Language: No aphasia  Articulation: No dysarthria  Orientation: Person, Place, Time   Visual Fields: Full  EOM (CN III, IV, VI): Full/intact  Pupils (CN II, III): PERRL  Facial Movement (CN VII): Symmetric facial expression    Motor: Arm left  Normal 5/5  Leg left  Normal 5/5  Arm right  Normal 5/5  Leg right Normal 5/5  Cebellar: No evidence of appendicular or axial ataxia  Sensation: Intact to  light touch, temperature and vibration      Laboratory:  CMP:   Recent Labs   Lab 03/12/19  1415   CALCIUM 9.5   ALBUMIN 3.6   PROT 6.8      K 3.9   CO2 23      BUN 17   CREATININE 1.1   ALKPHOS 76   *   AST 32   BILITOT 1.4*     CBC:   Recent Labs   Lab 03/12/19 1951   WBC 5.86   RBC 2.06*   HGB 7.0*   HCT 22.6*   PLT 41*   *   MCH 34.0*   MCHC 31.0*     Lipid Panel: No results for input(s): CHOL, LDLCALC, HDL, TRIG in the last 168 hours.  Coagulation:   Recent Labs   Lab 03/12/19 1951   INR 1.1     Hgb A1C: No results for input(s): HGBA1C in the last 168 hours.  TSH: No results for input(s): TSH in the last 168 hours.    Diagnostic Results:      Brain imaging:  CT head 3/12    1. Examination limited by patient motion artifact.  2. No hemorrhage or major vascular distribution infarct.  3. Chronic microvascular ischemic change.  4. Probable small right falcine densely calcified meningioma.  5.  Probable left maxillary retention cyst.  Right mastoid effusion.

## 2019-03-13 NOTE — PT/OT/SLP EVAL
Physical Therapy Evaluation    Patient Name:  Susan Puente   MRN:  [id number]    Recommendations:     Discharge Recommendations:  (OP pulm rehab)   Discharge Equipment Recommendations: none   Barriers to discharge: None    Assessment:     Susan Puente is a 68 y.o. female admitted with a medical diagnosis of Acute hypoxemic respiratory failure.  She presents with the following impairments/functional limitations:  impaired cardiopulmonary response to activity, impaired endurance .  At today's session, patient limited by increased SOB and fatigue with gait/stairs. Pt lives alone but states she has good neighbor support. Patient is close to their functional baseline at this time,  but would still benefit from skilled PT services to prevent deconditioning, reduce length of stay and prevent falls while in the hospital.   .     Rehab Prognosis: Good; patient would benefit from acute skilled PT services to address these deficits and reach maximum level of function.    Recent Surgery: * No surgery found *      Plan:     During this hospitalization, patient to be seen 2 x/week to address the identified rehab impairments via gait training, therapeutic activities, therapeutic exercises and progress toward the following goals:    · Plan of Care Expires:  04/13/19    Subjective     Chief Complaint: SOB, feeling tired   Patient/Family Comments/goals: to return home  Pain/Comfort:  · Pain Rating 1: 0/10  · Pain Rating Post-Intervention 1: 0/10    Patients cultural, spiritual, Adventist conflicts given the current situation: no    Living Environment:  Pt lives in a [employer] with 21 steps to bed/bath with BHR.   Prior to admission, patients level of function was mod ( I) using rollator when feeling poorly. Pt driving and navigating  Small grocery stores.  Equipment used at home: none.  DME owned (not currently used): none.  Upon discharge, patient will have assistance from her neighbors.    Objective:     Communicated  with RN  prior to session.  Patient found supine with  oxygen  upon PT entry to room.    General Precautions: Standard, fall   Orthopedic Precautions:N/A   Braces: N/A     Exams:  · Cognitive Exam:  Patient is oriented to Person, Place, Time and Situation  · Fine Motor Coordination: -       Intact  · Gross Motor Coordination:  WFL  · Postural Exam:  Patient presented with the following abnormalities: -       Rounded shoulders  · -       Forward head  · Sensation: -       Intact  · Skin Integrity/Edema:  -       Skin integrity: Visible skin intact  · RLE ROM: WFL  · RLE Strength: WFL  · LLE ROM: WFL  · LLE Strength: WFL    Functional Mobility:  · Bed Mobility:  Rolling Right: supervision  · Supine to Sit: supervision  · Transfers:  Sit to Stand:  stand by assistance with no AD  · Bed to Chair: stand by assistance with  no AD  using  Step Transfer  · Gait: x 50 feet ( 2 trials ) with SBA with no AD  · Stairs:  Pt ascended/descended 7  stair(s) with No Assistive Device with bilateral handrails with Contact Guard Assistance.       Therapeutic Activities and Exercises:  · Pt edu on pursed lip breathing, activity modification, energy conservation.      Patient education  · Patient educated on the role of PT and POC  · Patient educated on importance  activity while in the hosptial per tolerance for improved endurance and to limit deconditioning   · Patient educated on safe transfers with nursing as appropriate  · Patient educated on proper transfer mechanics and safety  · All of patients questions were answered within the scope of PT        AM-PAC 6 CLICK MOBILITY  Total Score:22     Patient left up in chair with all lines intact, call button in reach and RN  notified.    GOALS:   Multidisciplinary Problems     Physical Therapy Goals        Problem: Physical Therapy Goal    Goal Priority Disciplines Outcome Goal Variances Interventions   Physical Therapy Goal     PT, PT/OT Ongoing (interventions implemented as appropriate)      Description:  Goals to be met by: 3/23/19     Patient will increase functional independence with mobility by performin. Sit to stand transfer with [place]  2. Bed to chair transfer with Modified [place] using LRD.   3. Gait  x 200 feet with Supervision using LRD.   4. Ascend/descend 21 stair with bilateral Handrails Contact Guard Assistance LRD.                        History:     Past Medical History:   Diagnosis Date    Allergic rhinitis     Allergy     Anemia     Anxiety     CAD (coronary artery disease)     Carotid stenosis     Controlled type 2 diabetes mellitus without complication, without long-term current use of insulin 10/19/2016    Depression     GERD (gastroesophageal reflux disease)     Hearing loss in right ear     Hx of psychiatric care     Celexa, Prozac    MDS (myelodysplastic syndrome) with 5q deletion     Psychiatric problem     Therapy        Past Surgical History:   Procedure Laterality Date    Biopsy-bone marrow Left 2018    [facility] 2ND [facility]    BIOPSY-BONE MARROW Left 2017    [facility]    BIOPSY-BONE MARROW N/A 2017    [facility]    BUNIONECTOMY      CAROTID ENDARTERECTOMY Left     CAROTID STENT      COLONOSCOPY N/A 2016    Performed by PATRICIA Simpson MD at [facility] ENDO (4TH FLR)    ENDOMETRIAL ABLATION      for endometriosis    TONSILLECTOMY      TYMPANOSTOMY TUBE PLACEMENT         Time Tracking:     PT Received On: 19  PT Start Time: 1040     PT Stop Time: 1107  PT Total Time (min): 27 min     Billable Minutes: Evaluation x10 min and Therapeutic Activity x10 min      Augusto Rosenbaum, PT  2019

## 2019-03-13 NOTE — ASSESSMENT & PLAN NOTE
Patient with episode of unresponsiveness this afternoon around 18:30. EMS was called by family. Patient presented to the ED unresponsiveness and not arousable to sternal rub. Code stroke was called overhead. When patient presented to the ED she was also desatting in 60's. O2 sats elizabeth when non-rebreather applied and initial BP was 236/102.    On neuro exam patient oriented X 4, moving all extremities against resistance, no gaze preference, and dysarthria/aphasia. At this time signs and symptoms to not correlate with vascular distrubution injury. No further neurological workup necessary at this time.     We will sign off. Continue care as needed for current medical symptoms. Please call with any questions or concerns.

## 2019-03-13 NOTE — ASSESSMENT & PLAN NOTE
--MDS with 5q deletion, currently on Revelimid 10 mg daily - not on formulary- please arrange in the morning for pt self administration of med

## 2019-03-13 NOTE — ED NOTES
Spoke with BMT resident on phone to confirm if he wanted patient on cardiac monitoring to go upstairs. MD states OK for patient to go without cardiac monitoring. Will place pt in for transport.

## 2019-03-13 NOTE — PLAN OF CARE
Problem: Physical Therapy Goal  Goal: Physical Therapy Goal  Goals to be met by: 3/23/19     Patient will increase functional independence with mobility by performin. Sit to stand transfer with [place]  2. Bed to chair transfer with Modified [place] using LRD.   3. Gait  x 200 feet with Supervision using LRD.   4. Ascend/descend 21 stair with bilateral Handrails Contact Guard Assistance LRD.      Outcome: Ongoing (interventions implemented as appropriate)  Patient evaluated today. All goals established are appropriate for patient progression at this time.     Augusto Rosenbaum PT, DPT  3/13/2019  Pager: [phone]

## 2019-03-13 NOTE — PLAN OF CARE
MDR's with Dr Sotomayor.  Patient was admitted through the ED yesterday.   Patient was seen in clinic yesterday and received 1 U PRBC's.  Shortly after returning home she was found to be unresponsive.  EMS was called.  Patient LOC returned.  Vas neuro consulted to r/o stroke.  No acute changes seen on CT.  Suspect delayed transfusion reaction vs symptomatic anemia.  Patient was on a NRB and tachypneic.  Respiratory status has been improving.  Patient is now on 3L NC.  Hgb has dropped to 5.6 this am.  Q 12 hour labs ordered and patient will recieve 1 U PRBC's this morning.  Patient reports feeling better this am.   PT/OT eval placed to assess d/c needs.  CM will continue to follow.

## 2019-03-13 NOTE — ED NOTES
Pt remembers what happened to her. Pt states she was given blood transfusion today and it ended around 1830 tonight and pt temp was elevated prior to her being sent home. Pt states she was getting blood because she is supposed to have a biopsy on Thursday. Pt states when she got home she broke out into a sweat and started feeling very bad. Pt states she became weak and then went out. Pt states her neighbor was walking her dogs and pt attempted to call 911 herself but when the neighbor came inside she got her to call 911 for her.

## 2019-03-13 NOTE — HPI
Ms. Puente is 69 yo female with MDS diagnosed in 2016, BMBX,6/8/17, had normal cytogenetics. Repeat marrow from 6/7/18 shows relapsed 5q minus currently on Revlimid 10 mg daily also, history of CAD s/p PCI in [facility] in 2005 (3 stents in [facility], Dr ELIZABET Byers), HTN, dyslipidemia, DM II. Patient has a history of pancytopenia thought to be from Revlimid and gets intermittent PRBC transfusions. Today pt presents to the ER after an episode of unresponsiveness and shortness of breath after she received a unit of PRBC as out patient for Hb of 5.6 mg/dl . Pt states she has never had transfusion reactions in the past but states this time approximately an hour post transfusion she become clammy and sob at home. EMS was called by neighbors after pt was reportedly unresponsive. In the ED stroke code was activated and vascular surgery evaluated pt. CTH was negative for bleed or stroke, showed small calcified meningioma with out mass effect c/w findings in 2005. Pt later gained consciousness and was at baseline mental status with out intervention.     In the Ed pt had Hb of 7. Chest xray likely with mild pulmonary edema with mild elevation of BNP in the 600's. Pt was on room air when seen in the Ed. In no acute distress. Denies sob, chest pain, fever, chills, n/v, urinary symptoms.

## 2019-03-13 NOTE — PROGRESS NOTES
Notified NP of low H&H - see order for 1u prbc; also notified NP that lactic acid cannot be added on to previous labs, will need new order.

## 2019-03-14 ENCOUNTER — ANESTHESIA (OUTPATIENT)
Dept: SURGERY | Facility: HOSPITAL | Age: 69
End: 2019-03-14
Payer: MEDICARE

## 2019-03-14 LAB
ALBUMIN SERPL BCP-MCNC: 3 G/DL
ALP SERPL-CCNC: 62 U/L
ALT SERPL W/O P-5'-P-CCNC: 76 U/L
ANION GAP SERPL CALC-SCNC: 7 MMOL/L
ANISOCYTOSIS BLD QL SMEAR: SLIGHT
AST SERPL-CCNC: 27 U/L
BASOPHILS # BLD AUTO: 0.03 K/UL
BASOPHILS # BLD AUTO: 0.06 K/UL
BASOPHILS NFR BLD: 2.4 %
BASOPHILS NFR BLD: 3.9 %
BILIRUB SERPL-MCNC: 1.3 MG/DL
BLD PROD TYP BPU: NORMAL
BLOOD UNIT EXPIRATION DATE: NORMAL
BLOOD UNIT TYPE CODE: 6200
BLOOD UNIT TYPE: NORMAL
BUN SERPL-MCNC: 23 MG/DL
CALCIUM SERPL-MCNC: 8.7 MG/DL
CHLORIDE SERPL-SCNC: 105 MMOL/L
CO2 SERPL-SCNC: 26 MMOL/L
CODING SYSTEM: NORMAL
CREAT SERPL-MCNC: 0.9 MG/DL
DIFFERENTIAL METHOD: ABNORMAL
DIFFERENTIAL METHOD: ABNORMAL
DISPENSE STATUS: NORMAL
EOSINOPHIL # BLD AUTO: 0.2 K/UL
EOSINOPHIL # BLD AUTO: 0.2 K/UL
EOSINOPHIL NFR BLD: 15.6 %
EOSINOPHIL NFR BLD: 18.4 %
ERYTHROCYTE [DISTWIDTH] IN BLOOD BY AUTOMATED COUNT: 24.7 %
ERYTHROCYTE [DISTWIDTH] IN BLOOD BY AUTOMATED COUNT: 27.2 %
EST. GFR  (AFRICAN AMERICAN): >60 ML/MIN/1.73 M^2
EST. GFR  (NON AFRICAN AMERICAN): >60 ML/MIN/1.73 M^2
GLUCOSE SERPL-MCNC: 97 MG/DL
HAPTOGLOB SERPL-MCNC: 142 MG/DL
HCT VFR BLD AUTO: 17.7 %
HCT VFR BLD AUTO: 22.3 %
HGB BLD-MCNC: 6 G/DL
HGB BLD-MCNC: 7.6 G/DL
HYPOCHROMIA BLD QL SMEAR: ABNORMAL
IMM GRANULOCYTES # BLD AUTO: 0 K/UL
IMM GRANULOCYTES # BLD AUTO: 0.06 K/UL
IMM GRANULOCYTES NFR BLD AUTO: 0 %
IMM GRANULOCYTES NFR BLD AUTO: 3.9 %
LYMPHOCYTES # BLD AUTO: 0.6 K/UL
LYMPHOCYTES # BLD AUTO: 0.7 K/UL
LYMPHOCYTES NFR BLD: 46.8 %
LYMPHOCYTES NFR BLD: 51.2 %
MAGNESIUM SERPL-MCNC: 1.4 MG/DL
MCH RBC QN AUTO: 32.6 PG
MCH RBC QN AUTO: 33.7 PG
MCHC RBC AUTO-ENTMCNC: 33.9 G/DL
MCHC RBC AUTO-ENTMCNC: 34.1 G/DL
MCV RBC AUTO: 96 FL
MCV RBC AUTO: 99 FL
MONOCYTES # BLD AUTO: 0.1 K/UL
MONOCYTES # BLD AUTO: 0.1 K/UL
MONOCYTES NFR BLD: 4 %
MONOCYTES NFR BLD: 5.2 %
NEUTROPHILS # BLD AUTO: 0.3 K/UL
NEUTROPHILS # BLD AUTO: 0.4 K/UL
NEUTROPHILS NFR BLD: 24 %
NEUTROPHILS NFR BLD: 24.6 %
NRBC BLD-RTO: 0 /100 WBC
NRBC BLD-RTO: 2 /100 WBC
NUM UNITS TRANS PACKED RBC: NORMAL
OVALOCYTES BLD QL SMEAR: ABNORMAL
PHOSPHATE SERPL-MCNC: 3.7 MG/DL
PLATELET # BLD AUTO: 18 K/UL
PLATELET # BLD AUTO: 21 K/UL
PMV BLD AUTO: ABNORMAL FL
PMV BLD AUTO: ABNORMAL FL
POCT GLUCOSE: 110 MG/DL (ref 70–110)
POCT GLUCOSE: 120 MG/DL (ref 70–110)
POCT GLUCOSE: 123 MG/DL (ref 70–110)
POIKILOCYTOSIS BLD QL SMEAR: SLIGHT
POLYCHROMASIA BLD QL SMEAR: ABNORMAL
POTASSIUM SERPL-SCNC: 3.3 MMOL/L
PROT SERPL-MCNC: 5.6 G/DL
RBC # BLD AUTO: 1.78 M/UL
RBC # BLD AUTO: 2.33 M/UL
SODIUM SERPL-SCNC: 138 MMOL/L
WBC # BLD AUTO: 1.25 K/UL
WBC # BLD AUTO: 1.54 K/UL

## 2019-03-14 PROCEDURE — 84100 ASSAY OF PHOSPHORUS: CPT | Mod: HCNC

## 2019-03-14 PROCEDURE — 82272 OCCULT BLD FECES 1-3 TESTS: CPT | Mod: HCNC

## 2019-03-14 PROCEDURE — 25000003 PHARM REV CODE 250: Mod: HCNC | Performed by: INTERNAL MEDICINE

## 2019-03-14 PROCEDURE — 20600001 HC STEP DOWN PRIVATE ROOM: Mod: HCNC

## 2019-03-14 PROCEDURE — 83010 ASSAY OF HAPTOGLOBIN QUANT: CPT | Mod: HCNC

## 2019-03-14 PROCEDURE — 83735 ASSAY OF MAGNESIUM: CPT | Mod: HCNC

## 2019-03-14 PROCEDURE — 36415 COLL VENOUS BLD VENIPUNCTURE: CPT | Mod: HCNC

## 2019-03-14 PROCEDURE — 97530 THERAPEUTIC ACTIVITIES: CPT | Mod: HCNC

## 2019-03-14 PROCEDURE — 85025 COMPLETE CBC W/AUTO DIFF WBC: CPT | Mod: 91,HCNC

## 2019-03-14 PROCEDURE — P9038 RBC IRRADIATED: HCPCS | Mod: HCNC

## 2019-03-14 PROCEDURE — G8988 SELF CARE GOAL STATUS: HCPCS | Mod: CI,HCNC

## 2019-03-14 PROCEDURE — 27201040 HC RC 50 FILTER: Mod: HCNC

## 2019-03-14 PROCEDURE — G8989 SELF CARE D/C STATUS: HCPCS | Mod: CH,HCNC

## 2019-03-14 PROCEDURE — 63600175 PHARM REV CODE 636 W HCPCS: Mod: HCNC | Performed by: STUDENT IN AN ORGANIZED HEALTH CARE EDUCATION/TRAINING PROGRAM

## 2019-03-14 PROCEDURE — 80053 COMPREHEN METABOLIC PANEL: CPT | Mod: HCNC

## 2019-03-14 PROCEDURE — G8987 SELF CARE CURRENT STATUS: HCPCS | Mod: CI,HCNC

## 2019-03-14 PROCEDURE — 25000003 PHARM REV CODE 250: Mod: HCNC | Performed by: STUDENT IN AN ORGANIZED HEALTH CARE EDUCATION/TRAINING PROGRAM

## 2019-03-14 PROCEDURE — 99233 SBSQ HOSP IP/OBS HIGH 50: CPT | Mod: HCNC,,, | Performed by: INTERNAL MEDICINE

## 2019-03-14 PROCEDURE — 99233 PR SUBSEQUENT HOSPITAL CARE,LEVL III: ICD-10-PCS | Mod: HCNC,,, | Performed by: INTERNAL MEDICINE

## 2019-03-14 PROCEDURE — 25000003 PHARM REV CODE 250: Mod: HCNC | Performed by: NURSE PRACTITIONER

## 2019-03-14 PROCEDURE — 97165 OT EVAL LOW COMPLEX 30 MIN: CPT | Mod: HCNC

## 2019-03-14 RX ORDER — DIPHENHYDRAMINE HCL 25 MG
25 CAPSULE ORAL ONCE AS NEEDED
Status: COMPLETED | OUTPATIENT
Start: 2019-03-14 | End: 2019-03-14

## 2019-03-14 RX ORDER — RAMELTEON 8 MG/1
8 TABLET ORAL NIGHTLY PRN
Status: DISCONTINUED | OUTPATIENT
Start: 2019-03-14 | End: 2019-03-17 | Stop reason: HOSPADM

## 2019-03-14 RX ORDER — HYDROCODONE BITARTRATE AND ACETAMINOPHEN 500; 5 MG/1; MG/1
TABLET ORAL
Status: DISCONTINUED | OUTPATIENT
Start: 2019-03-14 | End: 2019-03-17 | Stop reason: HOSPADM

## 2019-03-14 RX ORDER — IPRATROPIUM BROMIDE AND ALBUTEROL SULFATE 2.5; .5 MG/3ML; MG/3ML
3 SOLUTION RESPIRATORY (INHALATION) EVERY 4 HOURS PRN
Status: DISCONTINUED | OUTPATIENT
Start: 2019-03-14 | End: 2019-03-17 | Stop reason: HOSPADM

## 2019-03-14 RX ORDER — ACETAMINOPHEN 325 MG/1
650 TABLET ORAL ONCE AS NEEDED
Status: COMPLETED | OUTPATIENT
Start: 2019-03-14 | End: 2019-03-14

## 2019-03-14 RX ADMIN — POTASSIUM BICARBONATE 50 MEQ: 978 TABLET, EFFERVESCENT ORAL at 10:03

## 2019-03-14 RX ADMIN — LISINOPRIL AND HYDROCHLOROTHIAZIDE 1 TABLET: 12.5; 1 TABLET ORAL at 08:03

## 2019-03-14 RX ADMIN — PREDNISONE 5 MG: 5 TABLET ORAL at 08:03

## 2019-03-14 RX ADMIN — DIPHENHYDRAMINE HYDROCHLORIDE 25 MG: 25 CAPSULE ORAL at 08:03

## 2019-03-14 RX ADMIN — CITALOPRAM HYDROBROMIDE 20 MG: 20 TABLET ORAL at 08:03

## 2019-03-14 RX ADMIN — ACETAMINOPHEN 650 MG: 325 TABLET ORAL at 08:03

## 2019-03-14 RX ADMIN — PANTOPRAZOLE SODIUM 40 MG: 40 TABLET, DELAYED RELEASE ORAL at 08:03

## 2019-03-14 RX ADMIN — RAMELTEON 8 MG: 8 TABLET, FILM COATED ORAL at 10:03

## 2019-03-14 NOTE — PROGRESS NOTES
[facility]  Hematology  Bone Marrow Transplant  Progress Note    Patient Name: Susan Puente  Admission Date: 3/12/2019  Hospital Length of Stay: 1 days  Code Status: Full Code    Subjective:     Interval History:  O2 titrated down to 1 L then room air with O2 sat at 92%. Patient still complained of dyspnea with exertion. Hgb back down to 6 after transfusion yesterday so another unit of pRBC ordered. Patient denies melena, hematochezia, or hematuria.    Objective:     Vital Signs (Most Recent):  Temp: 98.5 °F (36.9 °C) (03/14/19 1206)  Pulse: 65 (03/14/19 1206)  Resp: 18 (03/14/19 1206)  BP: (!) 105/52 (03/14/19 1206)  SpO2: 95 % (03/14/19 1206) Vital Signs (24h Range):  Temp:  [98 °F (36.7 °C)-99 °F (37.2 °C)] 98.5 °F (36.9 °C)  Pulse:  [65-82] 65  Resp:  [18-20] 18  SpO2:  [92 %-96 %] 95 %  BP: (105-139)/(52-68) 105/52     Weight: 86.2 kg (190 lb 0.6 oz)  Body mass index is 31.62 kg/m².  Body surface area is 1.99 meters squared.    ECOG SCORE         [unfilled]    Intake/Output - Last 3 Shifts       03/12 0700 - 03/13 0659 03/13 0700 - 03/14 0659 03/14 0700 - 03/15 0659    P.O.  1300 180    I.V. (mL/kg)  20 (0.2)     Blood  350 325    Total Intake(mL/kg)  1670 (19.4) 505 (5.9)    Urine (mL/kg/hr) 200 1500 (0.7) 450 (0.7)    Stool 0 0 0    Total Output 200 1500 450    Net -200 +170 +55           Urine Occurrence  2 x     Stool Occurrence 1 x 1 x 1 x          Physical Exam   Constitutional: She is oriented to person, place, and time. She appears well-developed and well-nourished. No distress.   HENT:   Head: Normocephalic and atraumatic.   Eyes: EOM are normal. Pupils are equal, round, and reactive to light. No scleral icterus.   Conjunctival pallor    Neck: Normal range of motion. Neck supple. No JVD present.   Cardiovascular: Normal rate and regular rhythm.   No murmur heard.  Pulmonary/Chest: Effort normal and breath sounds normal. No respiratory distress. She has no wheezes. She exhibits  no tenderness.   No rales or wheezing noted   Abdominal: Soft. Bowel sounds are normal. She exhibits no distension. There is no tenderness. There is no guarding.   Musculoskeletal: Normal range of motion. She exhibits no edema or deformity.   No lower extremity edema    Neurological: She is alert and oriented to person, place, and time. She has normal reflexes. No cranial nerve deficit.   Skin: Skin is warm and dry. Capillary refill takes less than 2 seconds. No erythema.       Significant Labs:   All pertinent labs from the last 24 hours have been reviewed.    Diagnostic Results:  I have reviewed all pertinent imaging results/findings within the past 24 hours.    Assessment/Plan:     * Acute hypoxemic respiratory failure    69 yo female with MDS, pancytopenia requiring routine PRBC transfusion presents to the ER with acute hypoxic respiratory failure and episode of unresponsiveness initially concerning for stroke. However, pt gained consciousness and now at baseline mental status with out medical intervention. Unclear etiology for sob/unresponsiveness. CTH negative for bleeding/acute process. Pt evaluated by vascular neurology, symptoms and findings not consistent with CVA.     --presentation most likely related to transfusion or mild fluid overload. No evidence of diffuse lung injury (TRALI) or diffuse overload (TACO) on CXR  --Mild elevation of Troponin, BNP likely demand ischemia 2/2 anemia  --labs (normal haptoglobin, LDH, negative direct bienvenido) not suggestive of hemolysis   --currently on room air with some dyspnea with exertion   - No need to diuresis at this time. If evidence with overload with multiple transfusions, will diurese with IV lasix       Pancytopenia    --could be due to chemotherapy, but suspicion for bone marrow failure due to poor response to blood transfusions  - No evidence of hemolysis   - Check hemo-occult stool test   - Consider early bone marrow biopsy to rule out bone marrow failure    - Transfuse for Hgb < 7 and Plts < 10     MDS (myelodysplastic syndrome) with 5q deletion    --MDS with 5q deletion, currently on Revelimid 10 mg daily - not on formulary  - will need to please arrange for pt self administration of med      Essential hypertension    --BP optimal, goal < 130/80  --continue home ACEi/HCTZ        Anemia requiring transfusions    --Pancytopenia 2/2 chemotherapy vs bone marrow failure  --anemia with Hb 7 on admit s/p a Unit of PRBC  - S/p 1 unit pRBC without appropriate response   - Will given another unit pRBC today  - Check hemo-occult stool test   --CBC q12h      CAD (coronary artery disease)    --continue ASA  --On Alirocumab for Hyperlipidemia - check with pharmacy       Controlled type 2 diabetes mellitus with stage 3 chronic kidney disease, without long-term current use of insulin    --on Metformin at home  --Hyperglycemia in the 290's on presentation  --POCT glucose with low dose SSI for now          VTE Risk Mitigation (From admission, onward)        Ordered     IP VTE HIGH RISK PATIENT  Once      03/13/19 0038          Disposition: transfuse 1 unit pRBC and re-assess. Hemo-occult stool test. May consider bone marrow biopsy    Ravi Meyer MD  Bone Marrow Transplant  [facility]

## 2019-03-14 NOTE — ASSESSMENT & PLAN NOTE
--could be due to chemotherapy, but suspicion for bone marrow failure due to poor response to blood transfusions  - No evidence of hemolysis   - Check hemo-occult stool test   - Consider early bone marrow biopsy to rule out bone marrow failure   - Transfuse for Hgb < 7 and Plts < 10

## 2019-03-14 NOTE — PLAN OF CARE
Problem: Adult Inpatient Plan of Care  Goal: Plan of Care Review  Patient has remained awake, alert and oriented to person, place, time and situation throughout this shift.  VSS.  -120.  Patient had large, formed BM x1 that was neg for occult blood.  Tolerated 1u prbc without difficulty.  No complaints of pain or nausea.  Was able to wean off O2 is sats 93-97% on room air even when ambulating with physical therapy today.  Patient has no questions/concerns.  Encouraged to call for assistance as needed and understands her fall risk.

## 2019-03-14 NOTE — PT/OT/SLP EVAL
"Occupational Therapy   Evaluation    Name: Susan Puente  MRN: [id number]  Admitting Diagnosis:  Acute hypoxemic respiratory failure      Recommendations:     Discharge Recommendations: (OP pulmonary rehab)  Discharge Equipment Recommendations:  none  Barriers to discharge:  None    Assessment:     Susan Puente is a 68 y.o. female with a medical diagnosis of Acute hypoxemic respiratory failure.  OT POC implemented. Pt presented to OT in high spirits with c/o fatigue after minimal activity. Pt's 02 was 92% after bed mobility and 97% after the session while seated in chair. Pt displayed minimal fatigue after ADLs at sink in standing, transfers, and functional mobility. PTA, pt I in all ADLs/ IADLs with shortness of breath being her main limiting factor. Discussed with patient on the importance of energy conservation for increased independence in functional activities. Pt verbalized understanding. OT is recommending outpatient pulmonary rehab to further reach pt's goals. While in house, pt will benefit from skilled OT 2X/wk for education, home management, and endurance training to increase her independence in ADLs/IADLs.      Performance deficits affecting function: impaired balance, impaired cardiopulmonary response to activity, impaired endurance.      Rehab Prognosis: Good; patient would benefit from acute skilled OT services to address these deficits and reach maximum level of function.       Plan:     Patient to be seen 2 x/week to address the above listed problems via self-care/home management, therapeutic activities, therapeutic exercises  · Plan of Care Expires:    · Plan of Care Reviewed with: patient    Subjective     Chief Complaint: Shortness of breath  Patient/Family Comments/goals: "I want to be able to walk my dogs again." Pt able to verbalize goals to work on for the next session. Pt agreeable to OT POC.    Occupational Profile:  Living Environment: Pt lives alone in a town home with 21 LIG[address] " bedroom and bathroom with bilateral handrails. Bathroom set up: Walk in shower with shower chair   Previous level of function: I in all ADLs/IADLs; Still driving at this time. PTA, pt used rollator intermittently for community outings and IADLs within the home such as cooking, cleaning, and laundry for a seated rest break.   Falls: None   Roles and Routines: Homemaker, loves her dogs, loves gardening  Equipment Used at Home:  none  Assistance upon Discharge: Pt will have assistance from neighbors upon discharge.     Pain/Comfort:  · Pain Rating 1: 0/10  · Pain Rating Post-Intervention 1: 0/10    Patients cultural, spiritual, Nondenominational conflicts given the current situation:      Objective:     Communicated with: RN prior to session.  Patient found HOB elevated with oxygen upon OT entry to room.    General Precautions: Standard, fall   Orthopedic Precautions:N/A   Braces: N/A     Occupational Performance:    Bed Mobility:    · Patient completed Rolling/Turning to Right with supervision  · Patient completed Scooting/Bridging with stand by assistance  · Patient completed Supine to Sit with stand by assistance    Functional Mobility/Transfers:  · Patient completed Sit <> Stand Transfer from bed with stand by assistance  with  no assistive device   · Patient completed Bed <> Chair Transfer using Step Transfer technique with stand by assistance with no assistive device  · Patient completed Toilet Transfer Step Transfer technique with stand by assistance with  grab bars and while using toilet in the restroom  · Functional Mobility: Pt ambulated to restroom to complete self care tasks with SBA wile utilizing IV pole with no LOBs or RBs required.     Activities of Daily Living:  · Feeding:  set up assistance to eat food on tray  · Grooming: stand by assistance to complete hand, facial, and dental hygiene in standing at sink  · Lower Body Dressing: supervision to don/doff RLE sock in sitting  · Toileting: stand by assistance  for wiping and clothing management in sitting    Cognitive/Visual Perceptual:  Cognitive/Psychosocial Skills:     -       Oriented to: Person, Place, Time and Situation   -       Follows Commands/attention:Follows multistep  commands  -       Communication: clear/fluent  -       Memory: No Deficits noted  -       Safety awareness/insight to disability: intact   -       Mood/Affect/Coping skills/emotional control: Appropriate to situation  Visual/Perceptual:     no deficits noted    Physical Exam:  Balance:    -       Sitting- Mod I   Standing: SBA  Postural examination/scapula alignment:    -       No postural abnormalities identified  Skin integrity: Visible skin intact  Edema:  None noted  Sensation:    -       Intact  Upper Extremity Range of Motion:     -       Right Upper Extremity: WFL  -       Left Upper Extremity: WFL  Upper Extremity Strength:    -       Right Upper Extremity: WFL  4/5  -       Left Upper Extremity: WFL  4/5   Strength:    -       Right Upper Extremity: WFL  -       Left Upper Extremity: WFL  Fine Motor Coordination:    -       Intact    AMPAC 6 Click ADL:  AMPAC Total Score: 23    Treatment & Education:  - Role of OT/ OT POC  - Self care safety/ independence  - Functional transfer/ mobility safety  - Bed mobility safety  - Pursed lip breathing for endurance   - Importance of sitting UIC to improve endurance  - Energy conservation techniques such as pacing, planning, and prioritizing the day    Education:    Patient left up in chair with all lines intact, call button in reach and RN notified    GOALS:   Multidisciplinary Problems     Occupational Therapy Goals        Problem: Occupational Therapy Goal    Goal Priority Disciplines Outcome Interventions   Occupational Therapy Goal     OT, PT/OT Ongoing (interventions implemented as appropriate)    Description:  Goals to be met by: 3/24/19    Patient will increase functional independence with ADLs by performing:    Grooming while standing  at sink with Modified [place].  Bathing from  shower chair/bench with Modified [place].  Step transfer with Modified [place] with no AD in preparation to walk her dogs upon returning home  Toilet transfer to toilet with Modified [place].  Upper extremity exercise program x15 reps per handout, with independence.  Pt will rate exertion on the perceived exertion scale as a 9 or below after OT session to improve endurance for ADLs/IADLs.  Pt will utilize 1 energy conservation technique during functional activities to improve endurance for ADLs/IADLs.                       History:     Past Medical History:   Diagnosis Date    Allergic rhinitis     Allergy     Anemia     Anxiety     CAD (coronary artery disease)     Carotid stenosis     Controlled type 2 diabetes mellitus without complication, without long-term current use of insulin 10/19/2016    Depression     GERD (gastroesophageal reflux disease)     Hearing loss in right ear     Hx of psychiatric care     Celexa, Prozac    MDS (myelodysplastic syndrome) with 5q deletion     Psychiatric problem     Therapy        Past Surgical History:   Procedure Laterality Date    Biopsy-bone marrow Left 6/7/2018    [facility] 2ND FLR    BIOPSY-BONE MARROW Left 6/8/2017    [facility] at [facility] OR 2ND FLR    BIOPSY-BONE MARROW N/A 1/5/2017    [facility] at [facility] OR 2ND FLR    BUNIONECTOMY      CAROTID ENDARTERECTOMY Left 2011    CAROTID STENT      COLONOSCOPY N/A 12/20/2016    Performed by PATRICIA Simpson MD at [facility] ENDO (4TH FLR)    ENDOMETRIAL ABLATION      for endometriosis    TONSILLECTOMY      TYMPANOSTOMY TUBE PLACEMENT         Time Tracking:     OT Date of Treatment: 03/14/19  OT Start Time: 0929  OT Stop Time: 0953  OT Total Time (min): 24 min    Billable Minutes:Evaluation 12  Therapeutic Activity 12    Malika Poon, OT  3/14/2019

## 2019-03-14 NOTE — ASSESSMENT & PLAN NOTE
--on Metformin at home  --Hyperglycemia in the 290's on presentation  --POCT glucose with low dose SSI for now

## 2019-03-14 NOTE — ASSESSMENT & PLAN NOTE
--MDS with 5q deletion, currently on Revelimid 10 mg daily - not on formulary  - will need to please arrange for pt self administration of med

## 2019-03-14 NOTE — ASSESSMENT & PLAN NOTE
--Pancytopenia 2/2 chemotherapy vs bone marrow failure  --anemia with Hb 7 on admit s/p a Unit of PRBC  - S/p 1 unit pRBC without appropriate response   - Will given another unit pRBC today  - Check hemo-occult stool test   --CBC q12h

## 2019-03-14 NOTE — PLAN OF CARE
Problem: Occupational Therapy Goal  Goal: Occupational Therapy Goal  Goals to be met by: 3/24/19    Patient will increase functional independence with ADLs by performing:    Grooming while standing at sink with Modified [place].  Bathing from  shower chair/bench with Modified [place].  Step transfer with Modified [place] with no AD in preparation to walk her dogs upon returning home  Toilet transfer to toilet with Modified [place].  Upper extremity exercise program x15 reps per handout, with independence.  Pt will rate exertion on the perceived exertion scale as a 9 or below after OT session to improve endurance for ADLs/IADLs.  Pt will utilize 1 energy conservation technique during functional activities to improve endurance for ADLs/IADLs.     Outcome: Ongoing (interventions implemented as appropriate)  OT POC implemented. Pt presented to OT in high spirits with c/o fatigue after minimal activity. Pt's 02 was 92% after bed mobility and 97% after the session while seated in chair. Pt displayed minimal fatigue after ADLs at sink in standing, transfers, and functional mobility. PTA, pt I in all ADLs/ IADLs with shortness of breath being her main limiting factor. Discussed with patient on the importance of energy conservation for increased independence in functional activities. Pt verbalized understanding. OT is recommending outpatient pulmonary rehab to further reach pt's goals. While in house, pt will benefit from skilled OT 2X/wk for education, home management, and endurance training to increase her independence in ADLs/IADLs.     Comments: Malika Poon OTR/L

## 2019-03-14 NOTE — SUBJECTIVE & OBJECTIVE
Subjective:     Interval History:  O2 titrated down to 1 L then room air with O2 sat at 92%. Patient still complained of dyspnea with exertion. Hgb back down to 6 after transfusion yesterday so another unit of pRBC ordered. Patient denies melena, hematochezia, or hematuria.    Objective:     Vital Signs (Most Recent):  Temp: 98.5 °F (36.9 °C) (03/14/19 1206)  Pulse: 65 (03/14/19 1206)  Resp: 18 (03/14/19 1206)  BP: (!) 105/52 (03/14/19 1206)  SpO2: 95 % (03/14/19 1206) Vital Signs (24h Range):  Temp:  [98 °F (36.7 °C)-99 °F (37.2 °C)] 98.5 °F (36.9 °C)  Pulse:  [65-82] 65  Resp:  [18-20] 18  SpO2:  [92 %-96 %] 95 %  BP: (105-139)/(52-68) 105/52     Weight: 86.2 kg (190 lb 0.6 oz)  Body mass index is 31.62 kg/m².  Body surface area is 1.99 meters squared.    ECOG SCORE         [unfilled]    Intake/Output - Last 3 Shifts       03/12 0700 - 03/13 0659 03/13 0700 - 03/14 0659 03/14 0700 - 03/15 0659    P.O.  1300 180    I.V. (mL/kg)  20 (0.2)     Blood  350 325    Total Intake(mL/kg)  1670 (19.4) 505 (5.9)    Urine (mL/kg/hr) 200 1500 (0.7) 450 (0.7)    Stool 0 0 0    Total Output 200 1500 450    Net -200 +170 +55           Urine Occurrence  2 x     Stool Occurrence 1 x 1 x 1 x          Physical Exam   Constitutional: She is oriented to person, place, and time. She appears well-developed and well-nourished. No distress.   HENT:   Head: Normocephalic and atraumatic.   Eyes: EOM are normal. Pupils are equal, round, and reactive to light. No scleral icterus.   Conjunctival pallor    Neck: Normal range of motion. Neck supple. No JVD present.   Cardiovascular: Normal rate and regular rhythm.   No murmur heard.  Pulmonary/Chest: Effort normal and breath sounds normal. No respiratory distress. She has no wheezes. She exhibits no tenderness.   No rales or wheezing noted   Abdominal: Soft. Bowel sounds are normal. She exhibits no distension. There is no tenderness. There is no guarding.   Musculoskeletal: Normal range of motion.  She exhibits no edema or deformity.   No lower extremity edema    Neurological: She is alert and oriented to person, place, and time. She has normal reflexes. No cranial nerve deficit.   Skin: Skin is warm and dry. Capillary refill takes less than 2 seconds. No erythema.       Significant Labs:   All pertinent labs from the last 24 hours have been reviewed.    Diagnostic Results:  I have reviewed all pertinent imaging results/findings within the past 24 hours.

## 2019-03-14 NOTE — PLAN OF CARE
Problem: Adult Inpatient Plan of Care  Goal: Plan of Care Review  Outcome: Ongoing (interventions implemented as appropriate)  POC reviewed at the start of the shift. Notified MD of patient's low H/H and PLT count. Pt continued on 1 L NC overnight. Pt has no complaints of SOB. No c/o pain. No c/o of n/v/d. Vital signs stable. Afebrile. Instructed patient to call for assistance. Bed low and locked, call bell within reach, nonskid socks on, pt verbalized understanding. Infection, pressure ulcer, and fall risks precautions maintained. Will continue to monitor.

## 2019-03-14 NOTE — PROGRESS NOTES
Dr. Meyer notified of room air sats of 88-90%  After he came to room and had patient stand up and move around, sats on room air were 90-93%.  Ok to keep on room air for now per Dr. Meyer.

## 2019-03-14 NOTE — PROGRESS NOTES
" Admit Assessment    Patient Identification  Susan Puente   :  1950  Admit Date:  3/12/2019  Attending Provider:  Rory Sotomayor MD              Referral:   Pt was admitted to  with a diagnosis of Acute hypoxemic respiratory failure, and was admitted this hospital stay due to Altered level of consciousness [R40.4]  Altered level of consciousness [R40.4].   is involved was referred to the Social Work Department via (Referal).  Patient presents as a 68 y.o. year old not  female.    Persons interviewed: Patient.       Living Situation:      Resides at [address] [place] [address] [facility] [address]. Pt resides at alone at the address mentioned in this report. Pt states she has two pet dogs at home. Pt has 20 steps of stairs inside the home and is a two story town house. Pt states she only has trouble walking up the 20 steps of stairs, when her blood count is low and will feel shortness of breathe when her blood count is low. Pt states she has 3 steps of stairs at the front door of the home and states that she "takes her time going up the steps". Pt does need assistance at home, and states she does hire cleaning serves to assist her with cleaning weekly. However, pt is independent with taking medication. Pt currently works from homes.     Contacts:  Pt's phone: [phone] (home).    Pt's friend cell(Jacy Nava): [phone]  Pt's friend cell(Akosua Bates): [phone]    (RETIRED) Functional Status Prior  Ambulation Prior: 0-->independent  Transferrin-->independent  Toiletin-->independent    Current or Past Agencies and Description of Services/Supplies    DME  Rolator and shower chair.     Home Health  None.    IV Infusion  None.    Nutrition: Oral    Outpatient Pharmacy:     [facility] - [facility] [address]  [address] [facility] [address]  Phone: [phone] Fax: [phone]    [facility] [place], [place] - [address]  [address]  [place] [address]  Phone: [phone] Fax: [phone]    [id number] - [place] - [address]  [address]  [place] [address]  Phone: [phone] Fax: [phone]    [facility] - [place], [place] - [address]  [address]  [facility] [address]  Phone: [phone] Fax: [phone]    [facility],  - [place], [place] - [address]  [address]  [place] [address]  Phone: [phone] Fax: [phone]      Patient Preference of agencies include: None.     Patient/Caregiver informed of right to choose providers or agencies.  Patient provides permission to release any necessary information to [facility] and to Non-[facility] agencies as needed to facilitate patient care, treatment planning, and patient discharge planning.  Written and verbal resources provided.      Coping   Pt states she only has anxiety when her friends express anxiety over her health. Pt states she has a good support system consisting of friends and her . Pt states she does have more than enough support from friends, , and housekeeping. Pt's adjustment to diagnosis and treatment is appropriate.        History/Current Symptoms of Anxiety/Depression: Yes  History/Current Substance Use:   Social History     Tobacco Use    Smoking status: Former Smoker     Packs/day: 0.50     Years: 36.00     Pack years: 18.00     Types: Cigarettes, Vaping with nicotine     Last attempt to quit: 3/3/2017     Years since quittin.0    Smokeless tobacco: Never Used   Substance and Sexual Activity    Alcohol use: No     Comment: rare, social    Drug use: No    Sexual activity: Not on file       Indications of Abuse/Neglect: No:   Abuse Screen (yes response referral indicated)  Feels Unsafe at Home or Work/School: no    Financial:  Payor/Plan Subscr  Sex Relation  Sub. Ins. ID Effective Group Num   1. [employer] MANAGE* LISANDRA PICKETT M* 1950 Female  [id number] 1/1/18 [id number]                                   [address] [address]   2. [employer]* LISANDRA PICKETT M* 1950 Female  [id number] 1/26/18                                    [address]             Other identified concerns/needs: Pt currently does not have any questions or concerns at this time for social work. SW intern provided supervisor's contact card for future purposes.     Plan: Pt plans to return to previous living situation at the time of discharge. Pt's friend Luisito will transport pt home at time of discharge.     Interventions/Referrals: None.   Patient/caregiver engaged in treatment planning process.     providing psychosocial and supportive counseling, resources, education, assistance and discharge planning as appropriate.  Patient/caregiver state understanding of  available resources,  following, remains available.

## 2019-03-15 LAB
ALBUMIN SERPL BCP-MCNC: 2.9 G/DL
ALP SERPL-CCNC: 65 U/L
ALT SERPL W/O P-5'-P-CCNC: 79 U/L
ANION GAP SERPL CALC-SCNC: 7 MMOL/L
ANISOCYTOSIS BLD QL SMEAR: SLIGHT
AST SERPL-CCNC: 30 U/L
BASOPHILS # BLD AUTO: 0.07 K/UL
BASOPHILS # BLD AUTO: 0.11 K/UL
BASOPHILS NFR BLD: 5.2 %
BASOPHILS NFR BLD: 5.7 %
BILIRUB SERPL-MCNC: 0.9 MG/DL
BLD PROD TYP BPU: NORMAL
BLD PROD TYP BPU: NORMAL
BLOOD UNIT EXPIRATION DATE: NORMAL
BLOOD UNIT EXPIRATION DATE: NORMAL
BLOOD UNIT TYPE CODE: 6200
BLOOD UNIT TYPE CODE: 6200
BLOOD UNIT TYPE: NORMAL
BLOOD UNIT TYPE: NORMAL
BUN SERPL-MCNC: 23 MG/DL
CALCIUM SERPL-MCNC: 8.7 MG/DL
CHLORIDE SERPL-SCNC: 104 MMOL/L
CO2 SERPL-SCNC: 27 MMOL/L
CODING SYSTEM: NORMAL
CODING SYSTEM: NORMAL
CREAT SERPL-MCNC: 1.1 MG/DL
DIFFERENTIAL METHOD: ABNORMAL
DIFFERENTIAL METHOD: ABNORMAL
DISPENSE STATUS: NORMAL
DISPENSE STATUS: NORMAL
EOSINOPHIL # BLD AUTO: 0.2 K/UL
EOSINOPHIL # BLD AUTO: 0.3 K/UL
EOSINOPHIL NFR BLD: 13.5 %
EOSINOPHIL NFR BLD: 15.7 %
ERYTHROCYTE [DISTWIDTH] IN BLOOD BY AUTOMATED COUNT: 22.4 %
ERYTHROCYTE [DISTWIDTH] IN BLOOD BY AUTOMATED COUNT: 24.8 %
EST. GFR  (AFRICAN AMERICAN): 59.6 ML/MIN/1.73 M^2
EST. GFR  (NON AFRICAN AMERICAN): 51.7 ML/MIN/1.73 M^2
GLUCOSE SERPL-MCNC: 110 MG/DL
HCT VFR BLD AUTO: 21.1 %
HCT VFR BLD AUTO: 27.4 %
HGB BLD-MCNC: 7.1 G/DL
HGB BLD-MCNC: 9.4 G/DL
HYPOCHROMIA BLD QL SMEAR: ABNORMAL
IMM GRANULOCYTES # BLD AUTO: 0.01 K/UL
IMM GRANULOCYTES # BLD AUTO: 0.02 K/UL
IMM GRANULOCYTES NFR BLD AUTO: 0.7 %
IMM GRANULOCYTES NFR BLD AUTO: 1 %
LYMPHOCYTES # BLD AUTO: 0.7 K/UL
LYMPHOCYTES # BLD AUTO: 1 K/UL
LYMPHOCYTES NFR BLD: 52.2 %
LYMPHOCYTES NFR BLD: 54.2 %
MAGNESIUM SERPL-MCNC: 1.2 MG/DL
MCH RBC QN AUTO: 31.3 PG
MCH RBC QN AUTO: 32.6 PG
MCHC RBC AUTO-ENTMCNC: 33.6 G/DL
MCHC RBC AUTO-ENTMCNC: 34.3 G/DL
MCV RBC AUTO: 91 FL
MCV RBC AUTO: 97 FL
MONOCYTES # BLD AUTO: 0.1 K/UL
MONOCYTES # BLD AUTO: 0.1 K/UL
MONOCYTES NFR BLD: 4.7 %
MONOCYTES NFR BLD: 5.2 %
NEUTROPHILS # BLD AUTO: 0.3 K/UL
NEUTROPHILS # BLD AUTO: 0.4 K/UL
NEUTROPHILS NFR BLD: 20.9 %
NEUTROPHILS NFR BLD: 21 %
NRBC BLD-RTO: 0 /100 WBC
NRBC BLD-RTO: 1 /100 WBC
NUM UNITS TRANS PACKED RBC: NORMAL
NUM UNITS TRANS PACKED RBC: NORMAL
OB PNL STL: NEGATIVE
OVALOCYTES BLD QL SMEAR: ABNORMAL
PHOSPHATE SERPL-MCNC: 3.9 MG/DL
PLATELET # BLD AUTO: 21 K/UL
PLATELET # BLD AUTO: 24 K/UL
PLATELET BLD QL SMEAR: ABNORMAL
PMV BLD AUTO: ABNORMAL FL
PMV BLD AUTO: ABNORMAL FL
POCT GLUCOSE: 134 MG/DL (ref 70–110)
POIKILOCYTOSIS BLD QL SMEAR: SLIGHT
POLYCHROMASIA BLD QL SMEAR: ABNORMAL
POTASSIUM SERPL-SCNC: 3.6 MMOL/L
PROT SERPL-MCNC: 5.7 G/DL
RBC # BLD AUTO: 2.18 M/UL
RBC # BLD AUTO: 3 M/UL
SODIUM SERPL-SCNC: 138 MMOL/L
WBC # BLD AUTO: 1.34 K/UL
WBC # BLD AUTO: 1.92 K/UL

## 2019-03-15 PROCEDURE — 84100 ASSAY OF PHOSPHORUS: CPT | Mod: HCNC

## 2019-03-15 PROCEDURE — 36415 COLL VENOUS BLD VENIPUNCTURE: CPT | Mod: HCNC

## 2019-03-15 PROCEDURE — 85025 COMPLETE CBC W/AUTO DIFF WBC: CPT | Mod: HCNC

## 2019-03-15 PROCEDURE — 25000003 PHARM REV CODE 250: Mod: HCNC | Performed by: INTERNAL MEDICINE

## 2019-03-15 PROCEDURE — 63600175 PHARM REV CODE 636 W HCPCS: Mod: HCNC | Performed by: STUDENT IN AN ORGANIZED HEALTH CARE EDUCATION/TRAINING PROGRAM

## 2019-03-15 PROCEDURE — 27201040 HC RC 50 FILTER: Mod: HCNC

## 2019-03-15 PROCEDURE — S4991 NICOTINE PATCH NONLEGEND: HCPCS | Mod: HCNC | Performed by: STUDENT IN AN ORGANIZED HEALTH CARE EDUCATION/TRAINING PROGRAM

## 2019-03-15 PROCEDURE — P9038 RBC IRRADIATED: HCPCS | Mod: HCNC

## 2019-03-15 PROCEDURE — 83735 ASSAY OF MAGNESIUM: CPT | Mod: HCNC

## 2019-03-15 PROCEDURE — 80053 COMPREHEN METABOLIC PANEL: CPT | Mod: HCNC

## 2019-03-15 PROCEDURE — 99233 SBSQ HOSP IP/OBS HIGH 50: CPT | Mod: HCNC,GC,, | Performed by: INTERNAL MEDICINE

## 2019-03-15 PROCEDURE — 25000003 PHARM REV CODE 250: Mod: HCNC | Performed by: STUDENT IN AN ORGANIZED HEALTH CARE EDUCATION/TRAINING PROGRAM

## 2019-03-15 PROCEDURE — 25000003 PHARM REV CODE 250: Mod: HCNC | Performed by: NURSE PRACTITIONER

## 2019-03-15 PROCEDURE — 20600001 HC STEP DOWN PRIVATE ROOM: Mod: HCNC

## 2019-03-15 PROCEDURE — 99233 PR SUBSEQUENT HOSPITAL CARE,LEVL III: ICD-10-PCS | Mod: HCNC,GC,, | Performed by: INTERNAL MEDICINE

## 2019-03-15 PROCEDURE — 63600175 PHARM REV CODE 636 W HCPCS: Mod: HCNC | Performed by: INTERNAL MEDICINE

## 2019-03-15 RX ORDER — IBUPROFEN 200 MG
1 TABLET ORAL DAILY
Status: DISCONTINUED | OUTPATIENT
Start: 2019-03-16 | End: 2019-03-15

## 2019-03-15 RX ORDER — MAGNESIUM SULFATE HEPTAHYDRATE 40 MG/ML
2 INJECTION, SOLUTION INTRAVENOUS ONCE
Status: COMPLETED | OUTPATIENT
Start: 2019-03-15 | End: 2019-03-15

## 2019-03-15 RX ORDER — FUROSEMIDE 10 MG/ML
40 INJECTION INTRAMUSCULAR; INTRAVENOUS ONCE
Status: COMPLETED | OUTPATIENT
Start: 2019-03-15 | End: 2019-03-15

## 2019-03-15 RX ORDER — LANOLIN ALCOHOL/MO/W.PET/CERES
800 CREAM (GRAM) TOPICAL ONCE
Status: COMPLETED | OUTPATIENT
Start: 2019-03-15 | End: 2019-03-15

## 2019-03-15 RX ORDER — IBUPROFEN 200 MG
1 TABLET ORAL DAILY
Status: DISCONTINUED | OUTPATIENT
Start: 2019-03-15 | End: 2019-03-17 | Stop reason: HOSPADM

## 2019-03-15 RX ADMIN — LISINOPRIL AND HYDROCHLOROTHIAZIDE 1 TABLET: 12.5; 1 TABLET ORAL at 09:03

## 2019-03-15 RX ADMIN — PANTOPRAZOLE SODIUM 40 MG: 40 TABLET, DELAYED RELEASE ORAL at 09:03

## 2019-03-15 RX ADMIN — RAMELTEON 8 MG: 8 TABLET, FILM COATED ORAL at 08:03

## 2019-03-15 RX ADMIN — MAGNESIUM OXIDE TAB 400 MG (241.3 MG ELEMENTAL MG) 800 MG: 400 (241.3 MG) TAB at 08:03

## 2019-03-15 RX ADMIN — PREDNISONE 5 MG: 5 TABLET ORAL at 09:03

## 2019-03-15 RX ADMIN — ACETAMINOPHEN 650 MG: 325 TABLET ORAL at 11:03

## 2019-03-15 RX ADMIN — FUROSEMIDE 40 MG: 10 INJECTION, SOLUTION INTRAMUSCULAR; INTRAVENOUS at 10:03

## 2019-03-15 RX ADMIN — DIPHENHYDRAMINE HYDROCHLORIDE 25 MG: 25 CAPSULE ORAL at 11:03

## 2019-03-15 RX ADMIN — CITALOPRAM HYDROBROMIDE 20 MG: 20 TABLET ORAL at 09:03

## 2019-03-15 RX ADMIN — Medication 1 PATCH: at 08:03

## 2019-03-15 RX ADMIN — MAGNESIUM SULFATE IN WATER 2 G: 40 INJECTION, SOLUTION INTRAVENOUS at 08:03

## 2019-03-15 NOTE — ASSESSMENT & PLAN NOTE
69 yo female with MDS, pancytopenia requiring routine PRBC transfusion presents to the ER with acute hypoxic respiratory failure and episode of unresponsiveness initially concerning for stroke. However, pt gained consciousness and now at baseline mental status with out medical intervention. Unclear etiology for sob/unresponsiveness. CTH negative for bleeding/acute process. Pt evaluated by vascular neurology, symptoms and findings not consistent with CVA.     --presentation most likely related to transfusion or mild fluid overload. No evidence of diffuse lung injury (TRALI) or diffuse overload (TACO) on CXR  --Mild elevation of Troponin, BNP likely demand ischemia 2/2 anemia  --labs (normal haptoglobin, LDH, negative direct bienvenido) not suggestive of hemolysis   --currently on room air but dyspnea with exertion   - Will give IV lasix 40 mg x 1

## 2019-03-15 NOTE — ASSESSMENT & PLAN NOTE
--Pancytopenia 2/2 chemotherapy vs bone marrow failure  --anemia with Hb 7 on admit   - S/p 2 units pRBC   - Will given additional 2 units today (Hgb 7.1)  - hemo-occult stool test negative  --CBC q12h

## 2019-03-15 NOTE — NURSING
Six minute walk test completed. At rest pt sats were 95% on room air. During ambulation, sats ranged 87-94%. At rest when completion of 6 min walk, pt sats were 97%.

## 2019-03-15 NOTE — PROGRESS NOTES
[facility]  Hematology  Bone Marrow Transplant  Progress Note    Patient Name: Susan Puente  Admission Date: 3/12/2019  Hospital Length of Stay: 2 days  Code Status: Full Code    Subjective:     Interval History: Patient still having dyspnea with exertion. Hgb 7.1 this morning. Hemo-occult negative. Denies CP, cough, wheezing     Objective:     Vital Signs (Most Recent):  Temp: 98.3 °F (36.8 °C) (03/15/19 1145)  Pulse: 69 (03/15/19 1145)  Resp: 18 (03/15/19 1145)  BP: (!) 145/61 (03/15/19 1145)  SpO2: 98 % (03/15/19 1145) Vital Signs (24h Range):  Temp:  [97.8 °F (36.6 °C)-98.4 °F (36.9 °C)] 98.3 °F (36.8 °C)  Pulse:  [64-82] 69  Resp:  [18] 18  SpO2:  [91 %-98 %] 98 %  BP: (119-145)/(52-63) 145/61     Weight: 86.2 kg (190 lb 0.6 oz)  Body mass index is 31.62 kg/m².  Body surface area is 1.99 meters squared.    ECOG SCORE         [unfilled]    Intake/Output - Last 3 Shifts       03/13 0700 - 03/14 0659 03/14 0700 - 03/15 0659 03/15 0700 - 03/16 0659    P.O. 1300 420     I.V. (mL/kg) 20 (0.2)      Blood 350 325 270    Total Intake(mL/kg) 1670 (19.4) 745 (8.6) 270 (3.1)    Urine (mL/kg/hr) 1500 (0.7) 1050 (0.5) 1000 (1.5)    Stool 0 0     Total Output 1500 1050 1000    Net +170 -305 -730           Urine Occurrence 2 x 1 x     Stool Occurrence 1 x 1 x           Physical Exam   Constitutional: She is oriented to person, place, and time. She appears well-developed and well-nourished. No distress.   HENT:   Head: Normocephalic and atraumatic.   Eyes: EOM are normal. Pupils are equal, round, and reactive to light. No scleral icterus.   Conjunctival pallor    Neck: Normal range of motion. Neck supple. No JVD present.   Cardiovascular: Normal rate and regular rhythm.   No murmur heard.  Pulmonary/Chest: Effort normal. No respiratory distress. She has no wheezes. She exhibits no tenderness.   Mild expiratory crackles at bases   Abdominal: Soft. Bowel sounds are normal. She exhibits no distension.  There is no tenderness. There is no guarding.   Musculoskeletal: Normal range of motion. She exhibits no edema or deformity.   No lower extremity edema    Neurological: She is alert and oriented to person, place, and time. She has normal reflexes. No cranial nerve deficit.   Skin: Skin is warm and dry. Capillary refill takes less than 2 seconds. No erythema.       Significant Labs:   All pertinent labs from the last 24 hours have been reviewed.    Diagnostic Results:  I have reviewed all pertinent imaging results/findings within the past 24 hours.    Assessment/Plan:     * Acute hypoxemic respiratory failure    69 yo female with MDS, pancytopenia requiring routine PRBC transfusion presents to the ER with acute hypoxic respiratory failure and episode of unresponsiveness initially concerning for stroke. However, pt gained consciousness and now at baseline mental status with out medical intervention. Unclear etiology for sob/unresponsiveness. CTH negative for bleeding/acute process. Pt evaluated by vascular neurology, symptoms and findings not consistent with CVA.     --presentation most likely related to transfusion or mild fluid overload. No evidence of diffuse lung injury (TRALI) or diffuse overload (TACO) on CXR  --Mild elevation of Troponin, BNP likely demand ischemia 2/2 anemia  --labs (normal haptoglobin, LDH, negative direct bienvenido) not suggestive of hemolysis   --currently on room air but dyspnea with exertion   - Will give IV lasix 40 mg x 1        Pancytopenia    --could be due to chemotherapy, but suspicion for bone marrow failure due to poor response to blood transfusions  - No evidence of hemolysis   - hemo-occult stool test negative  - Will give additional 2 units of pRBC today   - Transfuse for Hgb < 7 and Plts < 10     MDS (myelodysplastic syndrome) with 5q deletion    --MDS with 5q deletion, currently on Revelimid 10 mg daily - not on formulary  - will need to please arrange for pt self administration  of med      Essential hypertension    --BP optimal, goal < 130/80  --continue home ACEi/HCTZ        Anemia requiring transfusions    --Pancytopenia 2/2 chemotherapy vs bone marrow failure  --anemia with Hb 7 on admit   - S/p 2 units pRBC   - Will given additional 2 units today (Hgb 7.1)  - hemo-occult stool test negative  --CBC q12h      CAD (coronary artery disease)    --continue ASA  --On Alirocumab for Hyperlipidemia - check with pharmacy       Controlled type 2 diabetes mellitus with stage 3 chronic kidney disease, without long-term current use of insulin    --on Metformin at home  --Hyperglycemia in the 290's on presentation  --POCT glucose with low dose SSI for now          VTE Risk Mitigation (From admission, onward)        Ordered     IP VTE HIGH RISK PATIENT  Once      03/13/19 0038          Disposition: possible d/c home this weekend     Ravi Meyer MD  Bone Marrow Transplant  [facility]

## 2019-03-15 NOTE — PLAN OF CARE
Problem: Adult Inpatient Plan of Care  Goal: Plan of Care Review  Outcome: Ongoing (interventions implemented as appropriate)  Plan of care reviewed with pt. Pt verbalizes understanding of plan of care. AAOx4, ambulatory, afebrile, no c/o pain this shift. Pt received 2U PRBCs this shift. Pt on RA and VSS throughout the shift. Pt able to reposition self independently. Bed in lowest position and locked, side rails up x2, call light within reach. Pt encouraged to call for assistance. Will continue to monitor.

## 2019-03-15 NOTE — PLAN OF CARE
Problem: Adult Inpatient Plan of Care  Goal: Plan of Care Review  Outcome: Ongoing (interventions implemented as appropriate)  Patient progressing towards discharge.    Patient had no acute events noted.   Discussed POC with patient and family with verbalization of understanding.    Will continue to monitor.

## 2019-03-15 NOTE — ASSESSMENT & PLAN NOTE
--could be due to chemotherapy, but suspicion for bone marrow failure due to poor response to blood transfusions  - No evidence of hemolysis   - hemo-occult stool test negative  - Will give additional 2 units of pRBC today   - Transfuse for Hgb < 7 and Plts < 10

## 2019-03-15 NOTE — SUBJECTIVE & OBJECTIVE
Subjective:     Interval History: Patient still having dyspnea with exertion. Hgb 7.1 this morning. Hemo-occult negative. Denies CP, cough, wheezing     Objective:     Vital Signs (Most Recent):  Temp: 98.3 °F (36.8 °C) (03/15/19 1145)  Pulse: 69 (03/15/19 1145)  Resp: 18 (03/15/19 1145)  BP: (!) 145/61 (03/15/19 1145)  SpO2: 98 % (03/15/19 1145) Vital Signs (24h Range):  Temp:  [97.8 °F (36.6 °C)-98.4 °F (36.9 °C)] 98.3 °F (36.8 °C)  Pulse:  [64-82] 69  Resp:  [18] 18  SpO2:  [91 %-98 %] 98 %  BP: (119-145)/(52-63) 145/61     Weight: 86.2 kg (190 lb 0.6 oz)  Body mass index is 31.62 kg/m².  Body surface area is 1.99 meters squared.    ECOG SCORE         [unfilled]    Intake/Output - Last 3 Shifts       03/13 0700 - 03/14 0659 03/14 0700 - 03/15 0659 03/15 0700 - 03/16 0659    P.O. 1300 420     I.V. (mL/kg) 20 (0.2)      Blood 350 325 270    Total Intake(mL/kg) 1670 (19.4) 745 (8.6) 270 (3.1)    Urine (mL/kg/hr) 1500 (0.7) 1050 (0.5) 1000 (1.5)    Stool 0 0     Total Output 1500 1050 1000    Net +170 -305 -730           Urine Occurrence 2 x 1 x     Stool Occurrence 1 x 1 x           Physical Exam   Constitutional: She is oriented to person, place, and time. She appears well-developed and well-nourished. No distress.   HENT:   Head: Normocephalic and atraumatic.   Eyes: EOM are normal. Pupils are equal, round, and reactive to light. No scleral icterus.   Conjunctival pallor    Neck: Normal range of motion. Neck supple. No JVD present.   Cardiovascular: Normal rate and regular rhythm.   No murmur heard.  Pulmonary/Chest: Effort normal. No respiratory distress. She has no wheezes. She exhibits no tenderness.   Mild expiratory crackles at bases   Abdominal: Soft. Bowel sounds are normal. She exhibits no distension. There is no tenderness. There is no guarding.   Musculoskeletal: Normal range of motion. She exhibits no edema or deformity.   No lower extremity edema    Neurological: She is alert and oriented to person,  place, and time. She has normal reflexes. No cranial nerve deficit.   Skin: Skin is warm and dry. Capillary refill takes less than 2 seconds. No erythema.       Significant Labs:   All pertinent labs from the last 24 hours have been reviewed.    Diagnostic Results:  I have reviewed all pertinent imaging results/findings within the past 24 hours.

## 2019-03-15 NOTE — PLAN OF CARE
MDR's with Dr Cormier.  Patient has been weaned to RA but she continues to c/o severe VENEGAS.  Diuresis ordered to see if VENEGAS improves.  6 min walk test ordered as well.  PT/OT rec pulmonary rehab.  NP to follow up with team to see if that is needed.  Planning for potential weekend d/c.  CM sent a message to the oncology clinic to schedule OP labs next week.  Patient is scheduled for a BMB in the OR next Thursday.  No other needs anticipated.  CM will continue to follow.      Future Appointments   Date Time Provider Department Center   3/28/2019 11:00 AM LAB, HEMONC SAME DAY [facility]OM LAB [facility]   [facility]  [facility]   4/3/2019  3:30 PM Gita Valdes MD [facility] BM BAY [facility]        03/15/19 1413   Final Note   Assessment Type Final Discharge Note   Anticipated Discharge Disposition Home   What phone number can be called within the next 1-3 days to see how you are doing after discharge? ([phone])   Hospital Follow Up  Appt(s) scheduled? Yes   Discharge plans and expectations educations in teach back method with documentation complete? Yes

## 2019-03-16 LAB
ABO GROUP BLD: NORMAL
ALBUMIN SERPL BCP-MCNC: 3.1 G/DL
ALP SERPL-CCNC: 72 U/L
ALT SERPL W/O P-5'-P-CCNC: 84 U/L
ANION GAP SERPL CALC-SCNC: 8 MMOL/L
ANISOCYTOSIS BLD QL SMEAR: SLIGHT
AST SERPL-CCNC: 29 U/L
BASOPHILS # BLD AUTO: 0.11 K/UL
BASOPHILS NFR BLD: 5.5 %
BILIRUB SERPL-MCNC: 1.6 MG/DL
BLD GP AB SCN CELLS X3 SERPL QL: NORMAL
BUN SERPL-MCNC: 28 MG/DL
CALCIUM SERPL-MCNC: 9.3 MG/DL
CHLORIDE SERPL-SCNC: 102 MMOL/L
CO2 SERPL-SCNC: 30 MMOL/L
CREAT SERPL-MCNC: 1.1 MG/DL
DIFFERENTIAL METHOD: ABNORMAL
EOSINOPHIL # BLD AUTO: 0.3 K/UL
EOSINOPHIL NFR BLD: 14.9 %
ERYTHROCYTE [DISTWIDTH] IN BLOOD BY AUTOMATED COUNT: 22.4 %
EST. GFR  (AFRICAN AMERICAN): 59.6 ML/MIN/1.73 M^2
EST. GFR  (NON AFRICAN AMERICAN): 51.7 ML/MIN/1.73 M^2
GLUCOSE SERPL-MCNC: 96 MG/DL
HCT VFR BLD AUTO: 27.7 %
HGB BLD-MCNC: 9.1 G/DL
HYPOCHROMIA BLD QL SMEAR: ABNORMAL
IMM GRANULOCYTES # BLD AUTO: 0.03 K/UL
IMM GRANULOCYTES NFR BLD AUTO: 1.5 %
LYMPHOCYTES # BLD AUTO: 1.2 K/UL
LYMPHOCYTES NFR BLD: 59.2 %
MAGNESIUM SERPL-MCNC: 1.4 MG/DL
MCH RBC QN AUTO: 31 PG
MCHC RBC AUTO-ENTMCNC: 32.9 G/DL
MCV RBC AUTO: 94 FL
MONOCYTES # BLD AUTO: 0.1 K/UL
MONOCYTES NFR BLD: 4 %
NEUTROPHILS # BLD AUTO: 0.3 K/UL
NEUTROPHILS NFR BLD: 14.9 %
NRBC BLD-RTO: 1 /100 WBC
PHOSPHATE SERPL-MCNC: 4.4 MG/DL
PLATELET # BLD AUTO: 22 K/UL
PLATELET BLD QL SMEAR: ABNORMAL
PMV BLD AUTO: ABNORMAL FL
POCT GLUCOSE: 110 MG/DL (ref 70–110)
POCT GLUCOSE: 98 MG/DL (ref 70–110)
POTASSIUM SERPL-SCNC: 3.3 MMOL/L
PROT SERPL-MCNC: 6.1 G/DL
RBC # BLD AUTO: 2.94 M/UL
RH BLD: NORMAL
SODIUM SERPL-SCNC: 140 MMOL/L
WBC # BLD AUTO: 2.01 K/UL

## 2019-03-16 PROCEDURE — 83735 ASSAY OF MAGNESIUM: CPT | Mod: HCNC

## 2019-03-16 PROCEDURE — 84100 ASSAY OF PHOSPHORUS: CPT | Mod: HCNC

## 2019-03-16 PROCEDURE — 86901 BLOOD TYPING SEROLOGIC RH(D): CPT | Mod: HCNC

## 2019-03-16 PROCEDURE — 99232 SBSQ HOSP IP/OBS MODERATE 35: CPT | Mod: HCNC,GC,, | Performed by: INTERNAL MEDICINE

## 2019-03-16 PROCEDURE — 86900 BLOOD TYPING SEROLOGIC ABO: CPT | Mod: HCNC

## 2019-03-16 PROCEDURE — 20600001 HC STEP DOWN PRIVATE ROOM: Mod: HCNC

## 2019-03-16 PROCEDURE — 25000003 PHARM REV CODE 250: Mod: HCNC | Performed by: INTERNAL MEDICINE

## 2019-03-16 PROCEDURE — 63600175 PHARM REV CODE 636 W HCPCS: Mod: HCNC | Performed by: INTERNAL MEDICINE

## 2019-03-16 PROCEDURE — 25000003 PHARM REV CODE 250: Mod: HCNC | Performed by: STUDENT IN AN ORGANIZED HEALTH CARE EDUCATION/TRAINING PROGRAM

## 2019-03-16 PROCEDURE — 85025 COMPLETE CBC W/AUTO DIFF WBC: CPT | Mod: HCNC

## 2019-03-16 PROCEDURE — S4991 NICOTINE PATCH NONLEGEND: HCPCS | Mod: HCNC | Performed by: STUDENT IN AN ORGANIZED HEALTH CARE EDUCATION/TRAINING PROGRAM

## 2019-03-16 PROCEDURE — 80053 COMPREHEN METABOLIC PANEL: CPT | Mod: HCNC

## 2019-03-16 PROCEDURE — 86850 RBC ANTIBODY SCREEN: CPT | Mod: HCNC

## 2019-03-16 PROCEDURE — 99232 PR SUBSEQUENT HOSPITAL CARE,LEVL II: ICD-10-PCS | Mod: HCNC,GC,, | Performed by: INTERNAL MEDICINE

## 2019-03-16 PROCEDURE — 36415 COLL VENOUS BLD VENIPUNCTURE: CPT | Mod: HCNC

## 2019-03-16 PROCEDURE — 63600175 PHARM REV CODE 636 W HCPCS: Mod: HCNC | Performed by: STUDENT IN AN ORGANIZED HEALTH CARE EDUCATION/TRAINING PROGRAM

## 2019-03-16 RX ORDER — LISINOPRIL AND HYDROCHLOROTHIAZIDE 10; 12.5 MG/1; MG/1
2 TABLET ORAL DAILY
Status: DISCONTINUED | OUTPATIENT
Start: 2019-03-17 | End: 2019-03-17 | Stop reason: HOSPADM

## 2019-03-16 RX ORDER — MAGNESIUM SULFATE HEPTAHYDRATE 40 MG/ML
2 INJECTION, SOLUTION INTRAVENOUS ONCE
Status: COMPLETED | OUTPATIENT
Start: 2019-03-16 | End: 2019-03-16

## 2019-03-16 RX ORDER — POTASSIUM CHLORIDE 750 MG/1
40 CAPSULE, EXTENDED RELEASE ORAL ONCE
Status: COMPLETED | OUTPATIENT
Start: 2019-03-16 | End: 2019-03-16

## 2019-03-16 RX ADMIN — PREDNISONE 5 MG: 5 TABLET ORAL at 09:03

## 2019-03-16 RX ADMIN — CITALOPRAM HYDROBROMIDE 20 MG: 20 TABLET ORAL at 09:03

## 2019-03-16 RX ADMIN — Medication 1 PATCH: at 09:03

## 2019-03-16 RX ADMIN — RAMELTEON 8 MG: 8 TABLET, FILM COATED ORAL at 09:03

## 2019-03-16 RX ADMIN — POTASSIUM CHLORIDE 40 MEQ: 750 CAPSULE, EXTENDED RELEASE ORAL at 09:03

## 2019-03-16 RX ADMIN — PANTOPRAZOLE SODIUM 40 MG: 40 TABLET, DELAYED RELEASE ORAL at 09:03

## 2019-03-16 RX ADMIN — LISINOPRIL AND HYDROCHLOROTHIAZIDE 1 TABLET: 12.5; 1 TABLET ORAL at 09:03

## 2019-03-16 RX ADMIN — MAGNESIUM SULFATE IN WATER 2 G: 40 INJECTION, SOLUTION INTRAVENOUS at 09:03

## 2019-03-16 NOTE — PROGRESS NOTES
[facility]  Hematology  Bone Marrow Transplant  Progress Note    Patient Name: Susan Puente  Admission Date: 3/12/2019  Hospital Length of Stay: 3 days  Code Status: Full Code    Subjective:     Interval History:  Hgb 9.1 this morning. Net negative 1 L after lasix. Patient denies any dyspnea with exertion.     Objective:     Vital Signs (Most Recent):  Temp: 97.8 °F (36.6 °C) (03/16/19 1154)  Pulse: 65 (03/16/19 1154)  Resp: 18 (03/16/19 1154)  BP: (!) 145/66 (03/16/19 1154)  SpO2: (!) 94 % (03/16/19 1154) Vital Signs (24h Range):  Temp:  [97.3 °F (36.3 °C)-98.6 °F (37 °C)] 97.8 °F (36.6 °C)  Pulse:  [60-72] 65  Resp:  [18] 18  SpO2:  [93 %-96 %] 94 %  BP: (111-157)/(57-69) 145/66     Weight: 86.2 kg (190 lb 0.6 oz)  Body mass index is 31.62 kg/m².  Body surface area is 1.99 meters squared.    ECOG SCORE         [unfilled]    Intake/Output - Last 3 Shifts       03/14 0700 - 03/15 0659 03/15 0700 - 03/16 0659 03/16 0700 - 03/17 0659    P.O. 420      I.V. (mL/kg)       Blood 325 749     Total Intake(mL/kg) 745 (8.6) 749 (8.7)     Urine (mL/kg/hr) 1050 (0.5) 1850 (0.9)     Stool 0 0     Total Output 1050 1850     Net -305 -1101            Urine Occurrence 1 x 1 x     Stool Occurrence 1 x 1 x           Physical Exam   Constitutional: She is oriented to person, place, and time. She appears well-developed and well-nourished. No distress.   HENT:   Head: Normocephalic and atraumatic.   Eyes: EOM are normal. Pupils are equal, round, and reactive to light. No scleral icterus.   Neck: Normal range of motion. Neck supple. No JVD present.   Cardiovascular: Normal rate and regular rhythm.   No murmur heard.  Pulmonary/Chest: Effort normal and breath sounds normal. No respiratory distress. She has no wheezes. She exhibits no tenderness.   Abdominal: Soft. Bowel sounds are normal. She exhibits no distension. There is no tenderness. There is no guarding.   Musculoskeletal: Normal range of motion. She  exhibits no edema or deformity.   No lower extremity edema    Neurological: She is alert and oriented to person, place, and time. She has normal reflexes. No cranial nerve deficit.   Skin: Skin is warm and dry. Capillary refill takes less than 2 seconds. No erythema.       Significant Labs:   All pertinent labs from the last 24 hours have been reviewed.    Diagnostic Results:  I have reviewed all pertinent imaging results/findings within the past 24 hours.    Assessment/Plan:     * Acute hypoxemic respiratory failure    67 yo female with MDS, pancytopenia requiring routine PRBC transfusion presents to the ER with acute hypoxic respiratory failure and episode of unresponsiveness initially concerning for stroke. However, pt gained consciousness and now at baseline mental status with out medical intervention. Unclear etiology for sob/unresponsiveness. CTH negative for bleeding/acute process. Pt evaluated by vascular neurology, symptoms and findings not consistent with CVA.     --presentation most likely related to transfusion or mild fluid overload. No evidence of diffuse lung injury (TRALI) or diffuse overload (TACO) on CXR  --Mild elevation of Troponin, BNP likely demand ischemia 2/2 anemia  --labs (normal haptoglobin, LDH, negative direct bienvenido) not suggestive of hemolysis   - S/p lasix 40 mg IV x 1. Net neg 1.1 L. Patient reports improvement in symptoms   - currently on room air          Pancytopenia    --could be due to chemotherapy, but suspicion for bone marrow failure due to poor response to blood transfusions  - No evidence of hemolysis   - hemo-occult stool test negative  - S/p 4 units of pRBC during this admission   - Transfuse for Hgb < 7 and Plts < 10     MDS (myelodysplastic syndrome) with 5q deletion    --MDS with 5q deletion, currently on Revelimid 10 mg daily   - Advised patient to stop medication for now due to pancytopenia      Essential hypertension    --continue home ACEi/HCTZ        Anemia  requiring transfusions    --Pancytopenia 2/2 chemotherapy vs bone marrow failure  --anemia with Hb 7 on admit   - S/p 4 units pRBC total   - hemo-occult stool test negative  --Switch to cbc daily      CAD (coronary artery disease)    --continue ASA  --On Alirocumab for Hyperlipidemia - check with pharmacy       Controlled type 2 diabetes mellitus with stage 3 chronic kidney disease, without long-term current use of insulin    --on Metformin at home  --Hyperglycemia in the 290's on presentation  --POCT glucose with low dose SSI for now          VTE Risk Mitigation (From admission, onward)        Ordered     IP VTE HIGH RISK PATIENT  Once      03/13/19 0038          Disposition: likely d/c home today or tomorrow     Ravi Meyer MD  Bone Marrow Transplant  [facility]

## 2019-03-16 NOTE — ASSESSMENT & PLAN NOTE
--MDS with 5q deletion, currently on Revelimid 10 mg daily   - Advised patient to stop medication for now due to pancytopenia

## 2019-03-16 NOTE — ASSESSMENT & PLAN NOTE
--could be due to chemotherapy, but suspicion for bone marrow failure due to poor response to blood transfusions  - No evidence of hemolysis   - hemo-occult stool test negative  - S/p 4 units of pRBC during this admission   - Transfuse for Hgb < 7 and Plts < 10

## 2019-03-16 NOTE — NURSING
Patient ambulated back and forth down the herman for approx. 7 minutes. Denies SOB. O2 sats went from 94% on room air to 92%. Patient sats returned to baseline after resting for approx. 1 minute.

## 2019-03-16 NOTE — ASSESSMENT & PLAN NOTE
69 yo female with MDS, pancytopenia requiring routine PRBC transfusion presents to the ER with acute hypoxic respiratory failure and episode of unresponsiveness initially concerning for stroke. However, pt gained consciousness and now at baseline mental status with out medical intervention. Unclear etiology for sob/unresponsiveness. CTH negative for bleeding/acute process. Pt evaluated by vascular neurology, symptoms and findings not consistent with CVA.     --presentation most likely related to transfusion or mild fluid overload. No evidence of diffuse lung injury (TRALI) or diffuse overload (TACO) on CXR  --Mild elevation of Troponin, BNP likely demand ischemia 2/2 anemia  --labs (normal haptoglobin, LDH, negative direct bienvenido) not suggestive of hemolysis   - S/p lasix 40 mg IV x 1. Net neg 1.1 L. Patient reports improvement in symptoms   - currently on room air

## 2019-03-16 NOTE — ASSESSMENT & PLAN NOTE
--Pancytopenia 2/2 chemotherapy vs bone marrow failure  --anemia with Hb 7 on admit   - S/p 4 units pRBC total   - hemo-occult stool test negative  --Switch to cbc daily

## 2019-03-16 NOTE — PLAN OF CARE
Problem: Adult Inpatient Plan of Care  Goal: Plan of Care Review  Outcome: Ongoing (interventions implemented as appropriate)  Ms. Puente is AAO. NAD. VSS. Patient ambulated in hallway without any difficulty. O2 sats dropped from 94% to 92% on room air, patient returned to baseline after 1 minute of rest. Bed in lowest position, rails up x 2, wheels locked, call bell within reach.

## 2019-03-17 VITALS
WEIGHT: 190.06 LBS | HEART RATE: 67 BPM | TEMPERATURE: 98 F | HEIGHT: 65 IN | BODY MASS INDEX: 31.67 KG/M2 | OXYGEN SATURATION: 94 % | DIASTOLIC BLOOD PRESSURE: 74 MMHG | RESPIRATION RATE: 18 BRPM | SYSTOLIC BLOOD PRESSURE: 160 MMHG

## 2019-03-17 PROBLEM — E83.42 HYPOMAGNESEMIA: Status: ACTIVE | Noted: 2019-03-17

## 2019-03-17 LAB
ALBUMIN SERPL BCP-MCNC: 3.4 G/DL
ALP SERPL-CCNC: 81 U/L
ALT SERPL W/O P-5'-P-CCNC: 79 U/L
ANION GAP SERPL CALC-SCNC: 7 MMOL/L
AST SERPL-CCNC: 28 U/L
BASOPHILS # BLD AUTO: 0.13 K/UL
BASOPHILS NFR BLD: 5.8 %
BILIRUB SERPL-MCNC: 1.3 MG/DL
BUN SERPL-MCNC: 24 MG/DL
CALCIUM SERPL-MCNC: 9.8 MG/DL
CHLORIDE SERPL-SCNC: 103 MMOL/L
CO2 SERPL-SCNC: 29 MMOL/L
CREAT SERPL-MCNC: 0.9 MG/DL
DIFFERENTIAL METHOD: ABNORMAL
EOSINOPHIL # BLD AUTO: 0.2 K/UL
EOSINOPHIL NFR BLD: 9.8 %
ERYTHROCYTE [DISTWIDTH] IN BLOOD BY AUTOMATED COUNT: 22.7 %
EST. GFR  (AFRICAN AMERICAN): >60 ML/MIN/1.73 M^2
EST. GFR  (NON AFRICAN AMERICAN): >60 ML/MIN/1.73 M^2
GLUCOSE SERPL-MCNC: 92 MG/DL
HCT VFR BLD AUTO: 31.1 %
HGB BLD-MCNC: 10.2 G/DL
IMM GRANULOCYTES # BLD AUTO: 0.01 K/UL
IMM GRANULOCYTES NFR BLD AUTO: 0.4 %
LYMPHOCYTES # BLD AUTO: 1.3 K/UL
LYMPHOCYTES NFR BLD: 58.7 %
MAGNESIUM SERPL-MCNC: 1.6 MG/DL
MCH RBC QN AUTO: 30.8 PG
MCHC RBC AUTO-ENTMCNC: 32.8 G/DL
MCV RBC AUTO: 94 FL
MONOCYTES # BLD AUTO: 0.1 K/UL
MONOCYTES NFR BLD: 5.8 %
NEUTROPHILS # BLD AUTO: 0.4 K/UL
NEUTROPHILS NFR BLD: 19.5 %
NRBC BLD-RTO: 1 /100 WBC
PHOSPHATE SERPL-MCNC: 4.4 MG/DL
PLATELET # BLD AUTO: 28 K/UL
PMV BLD AUTO: ABNORMAL FL
POCT GLUCOSE: 97 MG/DL (ref 70–110)
POTASSIUM SERPL-SCNC: 3.6 MMOL/L
PROT SERPL-MCNC: 6.7 G/DL
RBC # BLD AUTO: 3.31 M/UL
SODIUM SERPL-SCNC: 139 MMOL/L
WBC # BLD AUTO: 2.25 K/UL

## 2019-03-17 PROCEDURE — 63600175 PHARM REV CODE 636 W HCPCS: Mod: HCNC | Performed by: STUDENT IN AN ORGANIZED HEALTH CARE EDUCATION/TRAINING PROGRAM

## 2019-03-17 PROCEDURE — 83735 ASSAY OF MAGNESIUM: CPT | Mod: HCNC

## 2019-03-17 PROCEDURE — 84100 ASSAY OF PHOSPHORUS: CPT | Mod: HCNC

## 2019-03-17 PROCEDURE — 25000003 PHARM REV CODE 250: Mod: HCNC | Performed by: STUDENT IN AN ORGANIZED HEALTH CARE EDUCATION/TRAINING PROGRAM

## 2019-03-17 PROCEDURE — 36415 COLL VENOUS BLD VENIPUNCTURE: CPT | Mod: HCNC

## 2019-03-17 PROCEDURE — 80053 COMPREHEN METABOLIC PANEL: CPT | Mod: HCNC

## 2019-03-17 PROCEDURE — S4991 NICOTINE PATCH NONLEGEND: HCPCS | Mod: HCNC | Performed by: STUDENT IN AN ORGANIZED HEALTH CARE EDUCATION/TRAINING PROGRAM

## 2019-03-17 PROCEDURE — 99238 HOSP IP/OBS DSCHRG MGMT 30/<: CPT | Mod: HCNC,GC,, | Performed by: INTERNAL MEDICINE

## 2019-03-17 PROCEDURE — 25000003 PHARM REV CODE 250: Mod: HCNC | Performed by: INTERNAL MEDICINE

## 2019-03-17 PROCEDURE — 99238 PR HOSPITAL DISCHARGE DAY,<30 MIN: ICD-10-PCS | Mod: HCNC,GC,, | Performed by: INTERNAL MEDICINE

## 2019-03-17 PROCEDURE — 85025 COMPLETE CBC W/AUTO DIFF WBC: CPT | Mod: HCNC

## 2019-03-17 RX ORDER — LISINOPRIL AND HYDROCHLOROTHIAZIDE 12.5; 2 MG/1; MG/1
2 TABLET ORAL DAILY
Qty: 180 TABLET | Refills: 0 | Status: SHIPPED | OUTPATIENT
Start: 2019-03-17 | End: 2019-07-09 | Stop reason: SDUPTHER

## 2019-03-17 RX ORDER — CETIRIZINE HYDROCHLORIDE 5 MG/1
5 TABLET ORAL DAILY
Status: DISCONTINUED | OUTPATIENT
Start: 2019-03-17 | End: 2019-03-17 | Stop reason: HOSPADM

## 2019-03-17 RX ORDER — LANOLIN ALCOHOL/MO/W.PET/CERES
400 CREAM (GRAM) TOPICAL ONCE
Status: COMPLETED | OUTPATIENT
Start: 2019-03-17 | End: 2019-03-17

## 2019-03-17 RX ADMIN — CETIRIZINE HYDROCHLORIDE 5 MG: 5 TABLET ORAL at 10:03

## 2019-03-17 RX ADMIN — Medication 1 PATCH: at 09:03

## 2019-03-17 RX ADMIN — CITALOPRAM HYDROBROMIDE 20 MG: 20 TABLET ORAL at 09:03

## 2019-03-17 RX ADMIN — PREDNISONE 5 MG: 5 TABLET ORAL at 09:03

## 2019-03-17 RX ADMIN — PANTOPRAZOLE SODIUM 40 MG: 40 TABLET, DELAYED RELEASE ORAL at 09:03

## 2019-03-17 RX ADMIN — LISINOPRIL AND HYDROCHLOROTHIAZIDE 2 TABLET: 12.5; 1 TABLET ORAL at 09:03

## 2019-03-17 RX ADMIN — MAGNESIUM OXIDE TAB 400 MG (241.3 MG ELEMENTAL MG) 400 MG: 400 (241.3 MG) TAB at 10:03

## 2019-03-17 NOTE — ASSESSMENT & PLAN NOTE
--Pancytopenia 2/2 chemotherapy vs bone marrow failure  --anemia with Hb 7 on admit   - S/p 4 units pRBC total   - hemo-occult stool test negative  --Switch to cbc daily  - Hgb 10.2 at discharge

## 2019-03-17 NOTE — PROGRESS NOTES
Attempted to restart IV x 2 without success. Dr. Gamboa notified.  Right AC 18g flushing appropriately and redressed.  Dr. Gamboa gave order to extend IV until discharge tomorrow.  Will continue to monitor.

## 2019-03-17 NOTE — DISCHARGE SUMMARY
[facility]  Hematology  Bone Marrow Transplant  Discharge Summary      Patient Name: Susan Puente  MRN: [id number]  Admission Date: 3/12/2019  Hospital Length of Stay: 4 days  Discharge Date and Time:  03/17/2019 1:01 PM  Attending Physician: Javon Cormier MD   Discharging Provider: Ravi Meyer MD  Primary Care Provider: Claudia Rapp MD    HPI: Ms. Puente is 69 yo female with MDS diagnosed in 2016, BMBX,6/8/17, had normal cytogenetics. Repeat marrow from 6/7/18 shows relapsed 5q minus currently on Revlimid 10 mg daily also, history of CAD s/p PCI in [facility] in 2005 (3 stents in [facility], Dr ELIZABET Byers), HTN, dyslipidemia, DM II. Patient has a history of pancytopenia thought to be from Revlimid and gets intermittent PRBC transfusions. Today pt presents to the ER after an episode of unresponsiveness and shortness of breath after she received a unit of PRBC as out patient for Hb of 5.6 mg/dl . Pt states she has never had transfusion reactions in the past but states this time approximately an hour post transfusion she become clammy and sob at home. EMS was called by neighbors after pt was reportedly unresponsive. In the ED stroke code was activated and vascular surgery evaluated pt. CTH was negative for bleed or stroke, showed small calcified meningioma with out mass effect c/w findings in 2005. Pt later gained consciousness and was at baseline mental status with out intervention.     In the Ed pt had Hb of 7. Chest xray likely with mild pulmonary edema with mild elevation of BNP in the 600's. Pt was on room air when seen in the Ed. In no acute distress. Denies sob, chest pain, fever, chills, n/v, urinary symptoms.         * No surgery found *     Hospital Course: 3/12-3/13  Pt admitted to BMT service for sob/unresponsive episode presumptively from transfusion reaction. CT head was negative for acute changes. CXR showed bilateral diffuse interstitial coarsening suggesting  pulmonary edema. She was initially placed on non-rebreather than de-escalated to NC. Mental status had improved to baseline upon evaluation in ED. She was noted to be pancytopenic with Hgb 5.6, WBC 1.2, and plts 21. She was given 1 unit pRBC. Smear showed occasional spherocytes, but labs (normal haptoglobin, low LDH) were not suggestive of hemolysis, though reticulocytes were elevated to 6.2%. It was suspected that patient's profound anemia contributed to syncopal episode prior to admit.    03/14/2019   O2 titrated down to 1 L then room air with O2 sat at 92%. Patient still complained of dyspnea with exertion. Hgb back down to 6 after transfusion yesterday so another unit or pRBC ordered. Hemo-occult stool test sent to rule out GI bleed though patient not having any melena or hematochezia. Decision regarding early bone marrow biopsy pending.    03/15/2019   Patient complaining of dyspnea with exertion. Hemo-occult test was negative. Hgb 7.1 this morning. Additional 2 units of pRBC ordered in additional to IV lasix 40 mg x 1. Checking 6-minute walk test    03/16/2019   Hgb 9.1 this morning. Net negative 1 L after lasix. Patient denies any dyspnea with exertion. Passed repeat 6-minute walk test. Anticipate d/c today or early tomorrow     03/17/2019   BP elevated to 163/78 this morning so lisinopril-HCTZ dose was doubled (which is her home dose). Patient reports improvement in fatigue and resp symptoms. Hgb 10.1. Patient was advised to stop Revlimid under she follow-up with hematologist (Dr. Valdes) outpatient. She was discharged home in good condition.         Vitals:    03/17/19 0739   BP: (!) 160/74   Pulse: 67   Resp: 18   Temp: 98 °F (36.7 °C)         Physical Exam from 3/17/19  Constitutional: She is oriented to person, place, and time. She appears well-developed and well-nourished. No distress.   HENT:   Head: Normocephalic and atraumatic.   Eyes: EOM are normal. Pupils are equal, round, and reactive to light. No  scleral icterus.   Neck: Normal range of motion. Neck supple. No JVD present.   Cardiovascular: Normal rate and regular rhythm.   No murmur heard.  Pulmonary/Chest: Effort normal and breath sounds normal. No respiratory distress. She has no wheezes. She exhibits no tenderness.   Abdominal: Soft. Bowel sounds are normal. She exhibits no distension. There is no tenderness. There is no guarding.   Musculoskeletal: Normal range of motion. She exhibits no edema or deformity.   No lower extremity edema    Neurological: She is alert and oriented to person, place, and time. She has normal reflexes. No cranial nerve deficit.   Skin: Skin is warm and dry. Capillary refill takes less than 2 seconds. No erythema.           Consults (From admission, onward)        Status Ordering Provider     Inpatient consult to Oncology  Once     Provider:  (Not yet assigned)    Acknowledged KIM ALVARENGA     Inpatient consult to Vascular (Stroke) Neurology  Once     Provider:  (Not yet assigned)    Completed KAILA VALENCIA III          Significant Diagnostic Studies: Labs: All labs within the past 24 hours have been reviewed    Pending Diagnostic Studies:     None        Final Active Diagnoses:    Diagnosis Date Noted POA    PRINCIPAL PROBLEM:  Acute hypoxemic respiratory failure [J96.01] 03/13/2019 Yes    Pancytopenia [D61.818] 03/13/2019 Yes    Altered level of consciousness [R40.4] 03/13/2019 Yes    MDS (myelodysplastic syndrome) with 5q deletion [D46.C] 06/08/2017 Yes    Essential hypertension [I10] 12/01/2016 Yes    Anemia requiring transfusions [D64.9] 11/10/2016 Yes    Controlled type 2 diabetes mellitus with stage 3 chronic kidney disease, without long-term current use of insulin [E11.22, N18.3] 10/19/2016 Yes    CAD (coronary artery disease) [I25.10]  Yes      Problems Resolved During this Admission:    Diagnosis Date Noted Date Resolved POA    Episode of unresponsiveness [R41.89] 03/12/2019 03/13/2019 Yes    Altered level  of consciousness [R40.4] 03/12/2019 03/13/2019 Yes      Discharged Condition: good    Disposition: Home or Self Care    Follow Up:  Follow-up Information     [facility] On 3/19/2019.    Specialty:  Lab  Why:  Labs- as scheduled by clinic   Contact information:  [address]  [facility] [id number] [phone]  Additional information:  [facility] 3rd Floor               Patient Instructions:   No discharge procedures on file.  Medications:  Reconciled Home Medications:      Medication List      CONTINUE taking these medications    alirocumab 75 mg/mL Pnij  Commonly known as:  PRALUENT PEN  Inject 1 mL (75 mg total) into the skin every 14 (fourteen) days.     aspirin 81 MG EC tablet  Commonly known as:  ECOTRIN  Take 81 mg by mouth once daily.     b complex vitamins capsule  Take 1 capsule by mouth once daily.     blood sugar diagnostic Strp  1 each by Misc.(Non-Drug; Combo Route) route once daily.     * blood-glucose meter kit  Use as instructed     * TRUE METRIX GLUCOSE METER Misc  Generic drug:  blood-glucose meter     * citalopram 20 MG tablet  Commonly known as:  CELEXA  Take 1 tablet (20 mg total) by mouth once daily.     * citalopram 20 MG tablet  Commonly known as:  CELEXA  TAKE ONE TABLET BY MOUTH EVERY DAY     fexofenadine 30 mg Tbdl  Take by mouth.     fish oil-omega-3 fatty acids 300-1,000 mg capsule  Take 4 g by mouth nightly.     lancets 28 gauge Misc  Commonly known as:  FREESTYLE LANCETS  1 lancet by Misc.(Non-Drug; Combo Route) route once daily.     lisinopril-hydrochlorothiazide 20-12.5 mg per tablet  Commonly known as:  PRINZIDE,ZESTORETIC  Take 2 tablets by mouth once daily.     magnesium 30 mg Tab  Take by mouth once.     metFORMIN 500 MG 24 hr tablet  Commonly known as:  GLUCOPHAGE-XR  Take 1 tablet (500 mg total) by mouth daily with breakfast.     NICOTROL 10 mg Crtg  Generic drug:  nicotine  INHALE ONE PUFF INTO THE MOUTH AS NEEDED MAXIMUM 6  CARTRIDGES/DAY.     pantoprazole 40 MG tablet  Commonly known as:  PROTONIX  Take 1 tablet (40 mg total) by mouth once daily.     predniSONE 5 MG tablet  Commonly known as:  DELTASONE  Take 2 tablets by mouth as directed by MD in clinic.     TYLENOL ARTHRITIS PAIN 650 MG Tbsr  Generic drug:  acetaminophen  Take 650 mg by mouth every 8 (eight) hours as needed (for pain).     walker Misc  1 each by Misc.(Non-Drug; Combo Route) route once daily at 6am.         * This list has 4 medication(s) that are the same as other medications prescribed for you. Read the directions carefully, and ask your doctor or other care provider to review them with you.            STOP taking these medications    lenalidomide 10 mg Cap  Commonly known as:  REVLIMID        ASK your doctor about these medications    potassium 99 mg Tab  Take by mouth once.            Ravi Meyer MD  Bone Marrow Transplant  [facility]

## 2019-03-19 ENCOUNTER — LAB VISIT (OUTPATIENT)
Dept: LAB | Facility: HOSPITAL | Age: 69
End: 2019-03-19
Payer: MEDICARE

## 2019-03-19 ENCOUNTER — TELEPHONE (OUTPATIENT)
Dept: HEMATOLOGY/ONCOLOGY | Facility: CLINIC | Age: 69
End: 2019-03-19

## 2019-03-19 ENCOUNTER — PATIENT MESSAGE (OUTPATIENT)
Dept: HEMATOLOGY/ONCOLOGY | Facility: CLINIC | Age: 69
End: 2019-03-19

## 2019-03-19 DIAGNOSIS — D46.C MDS (MYELODYSPLASTIC SYNDROME) WITH 5Q DELETION: ICD-10-CM

## 2019-03-19 DIAGNOSIS — D46.9 MDS (MYELODYSPLASTIC SYNDROME): ICD-10-CM

## 2019-03-19 LAB
ABO + RH BLD: NORMAL
ALBUMIN SERPL BCP-MCNC: 3.9 G/DL
ALP SERPL-CCNC: 94 U/L
ALT SERPL W/O P-5'-P-CCNC: 67 U/L
ANION GAP SERPL CALC-SCNC: 12 MMOL/L
ANISOCYTOSIS BLD QL SMEAR: SLIGHT
AST SERPL-CCNC: 28 U/L
BASOPHILS # BLD AUTO: 0.14 K/UL
BASOPHILS NFR BLD: 7.1 %
BILIRUB SERPL-MCNC: 1.1 MG/DL
BLD GP AB SCN CELLS X3 SERPL QL: NORMAL
BUN SERPL-MCNC: 24 MG/DL
CALCIUM SERPL-MCNC: 10.2 MG/DL
CHLORIDE SERPL-SCNC: 104 MMOL/L
CO2 SERPL-SCNC: 24 MMOL/L
CREAT SERPL-MCNC: 1.2 MG/DL
DIFFERENTIAL METHOD: ABNORMAL
EOSINOPHIL # BLD AUTO: 0.2 K/UL
EOSINOPHIL NFR BLD: 10.7 %
ERYTHROCYTE [DISTWIDTH] IN BLOOD BY AUTOMATED COUNT: 22.3 %
EST. GFR  (AFRICAN AMERICAN): 53.7 ML/MIN/1.73 M^2
EST. GFR  (NON AFRICAN AMERICAN): 46.5 ML/MIN/1.73 M^2
GLUCOSE SERPL-MCNC: 131 MG/DL
HCT VFR BLD AUTO: 33.7 %
HGB BLD-MCNC: 11.2 G/DL
IMM GRANULOCYTES # BLD AUTO: 0 K/UL
IMM GRANULOCYTES NFR BLD AUTO: 0 %
LYMPHOCYTES # BLD AUTO: 1.1 K/UL
LYMPHOCYTES NFR BLD: 54.1 %
MCH RBC QN AUTO: 31.1 PG
MCHC RBC AUTO-ENTMCNC: 33.2 G/DL
MCV RBC AUTO: 94 FL
MONOCYTES # BLD AUTO: 0.1 K/UL
MONOCYTES NFR BLD: 6.6 %
NEUTROPHILS # BLD AUTO: 0.4 K/UL
NEUTROPHILS NFR BLD: 21.5 %
NRBC BLD-RTO: 0 /100 WBC
PLATELET # BLD AUTO: 37 K/UL
PLATELET BLD QL SMEAR: ABNORMAL
PMV BLD AUTO: 13.4 FL
POIKILOCYTOSIS BLD QL SMEAR: SLIGHT
POLYCHROMASIA BLD QL SMEAR: ABNORMAL
POTASSIUM SERPL-SCNC: 3.3 MMOL/L
PROT SERPL-MCNC: 7.5 G/DL
RBC # BLD AUTO: 3.6 M/UL
SODIUM SERPL-SCNC: 140 MMOL/L
WBC # BLD AUTO: 1.96 K/UL

## 2019-03-19 PROCEDURE — 85025 COMPLETE CBC W/AUTO DIFF WBC: CPT | Mod: HCNC

## 2019-03-19 PROCEDURE — 80053 COMPREHEN METABOLIC PANEL: CPT | Mod: HCNC

## 2019-03-19 PROCEDURE — 86850 RBC ANTIBODY SCREEN: CPT | Mod: HCNC

## 2019-03-19 PROCEDURE — 36415 COLL VENOUS BLD VENIPUNCTURE: CPT | Mod: HCNC

## 2019-03-20 ENCOUNTER — TELEPHONE (OUTPATIENT)
Dept: HEMATOLOGY/ONCOLOGY | Facility: CLINIC | Age: 69
End: 2019-03-20

## 2019-03-21 ENCOUNTER — PATIENT MESSAGE (OUTPATIENT)
Dept: HEMATOLOGY/ONCOLOGY | Facility: CLINIC | Age: 69
End: 2019-03-21

## 2019-03-21 ENCOUNTER — HOSPITAL ENCOUNTER (OUTPATIENT)
Facility: HOSPITAL | Age: 69
Discharge: HOME | End: 2019-03-21
Attending: INTERNAL MEDICINE | Admitting: INTERNAL MEDICINE
Payer: MEDICARE

## 2019-03-21 VITALS
BODY MASS INDEX: 31.63 KG/M2 | TEMPERATURE: 98 F | DIASTOLIC BLOOD PRESSURE: 54 MMHG | OXYGEN SATURATION: 98 % | SYSTOLIC BLOOD PRESSURE: 116 MMHG | WEIGHT: 189.81 LBS | HEIGHT: 65 IN | HEART RATE: 63 BPM | RESPIRATION RATE: 14 BRPM

## 2019-03-21 DIAGNOSIS — C90.00 MULTIPLE MYELOMA, REMISSION STATUS UNSPECIFIED: Primary | ICD-10-CM

## 2019-03-21 DIAGNOSIS — D46.9 MDS (MYELODYSPLASTIC SYNDROME): ICD-10-CM

## 2019-03-21 LAB
ABO + RH BLD: NORMAL
ALBUMIN SERPL BCP-MCNC: 3.6 G/DL
ALP SERPL-CCNC: 83 U/L
ALT SERPL W/O P-5'-P-CCNC: 46 U/L
ANION GAP SERPL CALC-SCNC: 10 MMOL/L
ANISOCYTOSIS BLD QL SMEAR: SLIGHT
AST SERPL-CCNC: 21 U/L
BASOPHILS # BLD AUTO: 0.1 K/UL
BASOPHILS NFR BLD: 4.9 %
BILIRUB SERPL-MCNC: 0.8 MG/DL
BLD GP AB SCN CELLS X3 SERPL QL: NORMAL
BODY SITE - BONE MARROW: NORMAL
BONE MARROW IRON STAIN COMMENT: NORMAL
BUN SERPL-MCNC: 31 MG/DL
CALCIUM SERPL-MCNC: 9.7 MG/DL
CHLORIDE SERPL-SCNC: 107 MMOL/L
CLINICAL DIAGNOSIS - BONE MARROW: NORMAL
CO2 SERPL-SCNC: 26 MMOL/L
CREAT SERPL-MCNC: 1.1 MG/DL
DIFFERENTIAL METHOD: ABNORMAL
EOSINOPHIL # BLD AUTO: 0.2 K/UL
EOSINOPHIL NFR BLD: 8.8 %
ERYTHROCYTE [DISTWIDTH] IN BLOOD BY AUTOMATED COUNT: 22.1 %
EST. GFR  (AFRICAN AMERICAN): 59.6 ML/MIN/1.73 M^2
EST. GFR  (NON AFRICAN AMERICAN): 51.7 ML/MIN/1.73 M^2
FLOW CYTOMETRY ANTIBODIES ANALYZED - BONE MARROW: NORMAL
FLOW CYTOMETRY COMMENT - BONE MARROW: NORMAL
FLOW CYTOMETRY INTERPRETATION - BONE MARROW: NORMAL
GLUCOSE SERPL-MCNC: 104 MG/DL
HCT VFR BLD AUTO: 30.4 %
HGB BLD-MCNC: 9.9 G/DL
HYPOCHROMIA BLD QL SMEAR: ABNORMAL
IMM GRANULOCYTES # BLD AUTO: 0.01 K/UL
IMM GRANULOCYTES NFR BLD AUTO: 0.5 %
LYMPHOCYTES # BLD AUTO: 1 K/UL
LYMPHOCYTES NFR BLD: 49.8 %
MAGNESIUM SERPL-MCNC: 1.7 MG/DL
MCH RBC QN AUTO: 31.3 PG
MCHC RBC AUTO-ENTMCNC: 32.6 G/DL
MCV RBC AUTO: 96 FL
MONOCYTES # BLD AUTO: 0.2 K/UL
MONOCYTES NFR BLD: 8.8 %
NEUTROPHILS # BLD AUTO: 0.6 K/UL
NEUTROPHILS NFR BLD: 27.2 %
NRBC BLD-RTO: 0 /100 WBC
OVALOCYTES BLD QL SMEAR: ABNORMAL
PHOSPHATE SERPL-MCNC: 3.2 MG/DL
PLATELET # BLD AUTO: 43 K/UL
PLATELET BLD QL SMEAR: ABNORMAL
PMV BLD AUTO: 12.7 FL
POCT GLUCOSE: 107 MG/DL (ref 70–110)
POLYCHROMASIA BLD QL SMEAR: ABNORMAL
POTASSIUM SERPL-SCNC: 3.6 MMOL/L
PROT SERPL-MCNC: 6.9 G/DL
RBC # BLD AUTO: 3.16 M/UL
SODIUM SERPL-SCNC: 143 MMOL/L
WBC # BLD AUTO: 2.05 K/UL

## 2019-03-21 PROCEDURE — 88313 SPECIAL STAINS GROUP 2: CPT | Mod: HCNC

## 2019-03-21 PROCEDURE — 88364 INSITU HYBRIDIZATION (FISH): CPT | Mod: 26,HCNC,, | Performed by: PATHOLOGY

## 2019-03-21 PROCEDURE — 88237 TISSUE CULTURE BONE MARROW: CPT | Mod: HCNC

## 2019-03-21 PROCEDURE — 88313 SPECIAL STAINS GROUP 2: CPT | Mod: 26,HCNC,, | Performed by: PATHOLOGY

## 2019-03-21 PROCEDURE — 25000003 PHARM REV CODE 250: Mod: HCNC | Performed by: INTERNAL MEDICINE

## 2019-03-21 PROCEDURE — 88305 TISSUE SPECIMEN TO PATHOLOGY, BONE MARROW ASPIRATION/BIOPSY PROCEDURE: ICD-10-PCS | Mod: 26,HCNC,, | Performed by: PATHOLOGY

## 2019-03-21 PROCEDURE — D9220A PRA ANESTHESIA: Mod: HCNC,,, | Performed by: ANESTHESIOLOGY

## 2019-03-21 PROCEDURE — 88364 TISSUE SPECIMEN TO PATHOLOGY, BONE MARROW ASPIRATION/BIOPSY PROCEDURE: ICD-10-PCS | Mod: 26,HCNC,, | Performed by: PATHOLOGY

## 2019-03-21 PROCEDURE — 88189 FLOWCYTOMETRY/READ 16 & >: CPT | Mod: HCNC,,, | Performed by: PATHOLOGY

## 2019-03-21 PROCEDURE — 85097 TISSUE SPECIMEN TO PATHOLOGY, BONE MARROW ASPIRATION/BIOPSY PROCEDURE: ICD-10-PCS | Mod: HCNC,,, | Performed by: PATHOLOGY

## 2019-03-21 PROCEDURE — 88342 IMHCHEM/IMCYTCHM 1ST ANTB: CPT | Mod: 26,HCNC,59, | Performed by: PATHOLOGY

## 2019-03-21 PROCEDURE — 37000008 HC ANESTHESIA 1ST 15 MINUTES: Mod: HCNC | Performed by: INTERNAL MEDICINE

## 2019-03-21 PROCEDURE — 82962 GLUCOSE BLOOD TEST: CPT | Mod: HCNC | Performed by: INTERNAL MEDICINE

## 2019-03-21 PROCEDURE — 88341 TISSUE SPECIMEN TO PATHOLOGY, BONE MARROW ASPIRATION/BIOPSY PROCEDURE: ICD-10-PCS | Mod: 26,HCNC,59, | Performed by: PATHOLOGY

## 2019-03-21 PROCEDURE — 80053 COMPREHEN METABOLIC PANEL: CPT | Mod: HCNC

## 2019-03-21 PROCEDURE — 88305 TISSUE EXAM BY PATHOLOGIST: CPT | Mod: 26,HCNC,, | Performed by: PATHOLOGY

## 2019-03-21 PROCEDURE — 84100 ASSAY OF PHOSPHORUS: CPT | Mod: HCNC

## 2019-03-21 PROCEDURE — D9220A PRA ANESTHESIA: ICD-10-PCS | Mod: HCNC,,, | Performed by: ANESTHESIOLOGY

## 2019-03-21 PROCEDURE — 88365 INSITU HYBRIDIZATION (FISH): CPT | Mod: 26,HCNC,, | Performed by: PATHOLOGY

## 2019-03-21 PROCEDURE — 71000044 HC DOSC ROUTINE RECOVERY FIRST HOUR: Mod: HCNC | Performed by: INTERNAL MEDICINE

## 2019-03-21 PROCEDURE — 88341 IMHCHEM/IMCYTCHM EA ADD ANTB: CPT | Mod: 26,HCNC,59, | Performed by: PATHOLOGY

## 2019-03-21 PROCEDURE — 88299 UNLISTED CYTOGENETIC STUDY: CPT | Mod: HCNC

## 2019-03-21 PROCEDURE — 63600175 PHARM REV CODE 636 W HCPCS: Mod: HCNC | Performed by: NURSE ANESTHETIST, CERTIFIED REGISTERED

## 2019-03-21 PROCEDURE — 85097 BONE MARROW INTERPRETATION: CPT | Mod: HCNC,,, | Performed by: PATHOLOGY

## 2019-03-21 PROCEDURE — 88311 DECALCIFY TISSUE: CPT | Mod: 26,HCNC,, | Performed by: PATHOLOGY

## 2019-03-21 PROCEDURE — 88341 IMHCHEM/IMCYTCHM EA ADD ANTB: CPT | Mod: HCNC | Performed by: PATHOLOGY

## 2019-03-21 PROCEDURE — 88264 CHROMOSOME ANALYSIS 20-25: CPT | Mod: HCNC

## 2019-03-21 PROCEDURE — 38222 DX BONE MARROW BX & ASPIR: CPT | Mod: HCNC,LT,, | Performed by: NURSE PRACTITIONER

## 2019-03-21 PROCEDURE — 83735 ASSAY OF MAGNESIUM: CPT | Mod: HCNC

## 2019-03-21 PROCEDURE — 38222 PR BONE MARROW BIOPSY(IES) W/ASPIRATION(S); DIAGNOSTIC: ICD-10-PCS | Mod: HCNC,LT,, | Performed by: NURSE PRACTITIONER

## 2019-03-21 PROCEDURE — 88365 TISSUE SPECIMEN TO PATHOLOGY, BONE MARROW ASPIRATION/BIOPSY PROCEDURE: ICD-10-PCS | Mod: 26,HCNC,, | Performed by: PATHOLOGY

## 2019-03-21 PROCEDURE — 36000704 HC OR TIME LEV I 1ST 15 MIN: Mod: HCNC | Performed by: INTERNAL MEDICINE

## 2019-03-21 PROCEDURE — 88311 TISSUE SPECIMEN TO PATHOLOGY, BONE MARROW ASPIRATION/BIOPSY PROCEDURE: ICD-10-PCS | Mod: 26,HCNC,, | Performed by: PATHOLOGY

## 2019-03-21 PROCEDURE — 88271 CYTOGENETICS DNA PROBE: CPT | Mod: 59,HCNC

## 2019-03-21 PROCEDURE — 88184 FLOWCYTOMETRY/ TC 1 MARKER: CPT | Mod: HCNC | Performed by: PATHOLOGY

## 2019-03-21 PROCEDURE — 88342 TISSUE SPECIMEN TO PATHOLOGY, BONE MARROW ASPIRATION/BIOPSY PROCEDURE: ICD-10-PCS | Mod: 26,HCNC,59, | Performed by: PATHOLOGY

## 2019-03-21 PROCEDURE — 86901 BLOOD TYPING SEROLOGIC RH(D): CPT | Mod: HCNC

## 2019-03-21 PROCEDURE — 88189 PR  FLOWCYTOMETRY/READ, 16 & > MARKERS: ICD-10-PCS | Mod: HCNC,,, | Performed by: PATHOLOGY

## 2019-03-21 PROCEDURE — 88313 TISSUE SPECIMEN TO PATHOLOGY, BONE MARROW ASPIRATION/BIOPSY PROCEDURE: ICD-10-PCS | Mod: 26,HCNC,, | Performed by: PATHOLOGY

## 2019-03-21 PROCEDURE — 71000015 HC POSTOP RECOV 1ST HR: Mod: HCNC | Performed by: INTERNAL MEDICINE

## 2019-03-21 PROCEDURE — 85025 COMPLETE CBC W/AUTO DIFF WBC: CPT | Mod: HCNC

## 2019-03-21 PROCEDURE — 88305 TISSUE EXAM BY PATHOLOGIST: CPT | Mod: HCNC,59 | Performed by: PATHOLOGY

## 2019-03-21 PROCEDURE — 88342 IMHCHEM/IMCYTCHM 1ST ANTB: CPT | Mod: HCNC,59 | Performed by: PATHOLOGY

## 2019-03-21 PROCEDURE — 88185 FLOWCYTOMETRY/TC ADD-ON: CPT | Mod: 59,HCNC | Performed by: PATHOLOGY

## 2019-03-21 RX ORDER — PROPOFOL 10 MG/ML
VIAL (ML) INTRAVENOUS
Status: DISCONTINUED | OUTPATIENT
Start: 2019-03-21 | End: 2019-03-21

## 2019-03-21 RX ORDER — LIDOCAINE HYDROCHLORIDE 10 MG/ML
INJECTION, SOLUTION EPIDURAL; INFILTRATION; INTRACAUDAL; PERINEURAL
Status: DISCONTINUED | OUTPATIENT
Start: 2019-03-21 | End: 2019-03-21 | Stop reason: HOSPADM

## 2019-03-21 RX ORDER — LIDOCAINE HCL/PF 100 MG/5ML
SYRINGE (ML) INTRAVENOUS
Status: DISCONTINUED | OUTPATIENT
Start: 2019-03-21 | End: 2019-03-21

## 2019-03-21 RX ORDER — SODIUM CHLORIDE 9 MG/ML
INJECTION, SOLUTION INTRAVENOUS CONTINUOUS
Status: DISCONTINUED | OUTPATIENT
Start: 2019-03-21 | End: 2019-03-21 | Stop reason: HOSPADM

## 2019-03-21 RX ORDER — FENTANYL CITRATE 50 UG/ML
25 INJECTION, SOLUTION INTRAMUSCULAR; INTRAVENOUS EVERY 5 MIN PRN
Status: DISCONTINUED | OUTPATIENT
Start: 2019-03-21 | End: 2019-03-21 | Stop reason: HOSPADM

## 2019-03-21 RX ORDER — FENTANYL CITRATE 50 UG/ML
INJECTION, SOLUTION INTRAMUSCULAR; INTRAVENOUS
Status: DISCONTINUED | OUTPATIENT
Start: 2019-03-21 | End: 2019-03-21

## 2019-03-21 RX ORDER — DIPHENHYDRAMINE HYDROCHLORIDE 50 MG/ML
25 INJECTION INTRAMUSCULAR; INTRAVENOUS EVERY 6 HOURS PRN
Status: DISCONTINUED | OUTPATIENT
Start: 2019-03-21 | End: 2019-03-21 | Stop reason: HOSPADM

## 2019-03-21 RX ORDER — LENALIDOMIDE 10 MG/1
10 CAPSULE ORAL DAILY
Qty: 28 EACH | Refills: 0 | Status: SHIPPED | OUTPATIENT
Start: 2019-03-21 | End: 2019-04-26 | Stop reason: SDUPTHER

## 2019-03-21 RX ORDER — LIDOCAINE HYDROCHLORIDE 10 MG/ML
1 INJECTION, SOLUTION EPIDURAL; INFILTRATION; INTRACAUDAL; PERINEURAL ONCE
Status: COMPLETED | OUTPATIENT
Start: 2019-03-21 | End: 2019-03-21

## 2019-03-21 RX ORDER — HYDROMORPHONE HYDROCHLORIDE 1 MG/ML
0.2 INJECTION, SOLUTION INTRAMUSCULAR; INTRAVENOUS; SUBCUTANEOUS EVERY 5 MIN PRN
Status: DISCONTINUED | OUTPATIENT
Start: 2019-03-21 | End: 2019-03-21 | Stop reason: HOSPADM

## 2019-03-21 RX ORDER — MEPERIDINE HYDROCHLORIDE 50 MG/ML
12.5 INJECTION INTRAMUSCULAR; INTRAVENOUS; SUBCUTANEOUS ONCE AS NEEDED
Status: DISCONTINUED | OUTPATIENT
Start: 2019-03-21 | End: 2019-03-21 | Stop reason: HOSPADM

## 2019-03-21 RX ORDER — ONDANSETRON 2 MG/ML
4 INJECTION INTRAMUSCULAR; INTRAVENOUS ONCE AS NEEDED
Status: DISCONTINUED | OUTPATIENT
Start: 2019-03-21 | End: 2019-03-21 | Stop reason: HOSPADM

## 2019-03-21 RX ADMIN — LIDOCAINE HYDROCHLORIDE 50 MG: 20 INJECTION, SOLUTION INTRAVENOUS at 09:03

## 2019-03-21 RX ADMIN — LIDOCAINE HYDROCHLORIDE 2 MG: 10 INJECTION, SOLUTION EPIDURAL; INFILTRATION; INTRACAUDAL; PERINEURAL at 08:03

## 2019-03-21 RX ADMIN — FENTANYL CITRATE 50 MCG: 50 INJECTION, SOLUTION INTRAMUSCULAR; INTRAVENOUS at 09:03

## 2019-03-21 RX ADMIN — SODIUM CHLORIDE: 0.9 INJECTION, SOLUTION INTRAVENOUS at 08:03

## 2019-03-21 RX ADMIN — PROPOFOL 50 MG: 10 INJECTION, EMULSION INTRAVENOUS at 09:03

## 2019-03-21 NOTE — ANESTHESIA POSTPROCEDURE EVALUATION
"Anesthesia Post Evaluation    Patient: Susan Puente    Procedure(s) Performed: Procedure(s) (LRB):  Biopsy-bone marrow (Left)    Final Anesthesia Type: general  Patient location during evaluation: PACU  Patient participation: Yes- Able to Participate  Level of consciousness: awake and alert  Post-procedure vital signs: reviewed and stable  Pain management: adequate  Airway patency: patent  PONV status at discharge: No PONV  Anesthetic complications: no      Cardiovascular status: blood pressure returned to baseline  Respiratory status: unassisted  Hydration status: euvolemic  Follow-up not needed.        Visit Vitals  BP (!) 116/54 (BP Location: Right arm, Patient Position: Lying)   Pulse 63   Temp 36.6 °C (97.9 °F) (Temporal)   Resp 14   Ht 5' 5" (1.651 m)   Wt 86.1 kg (189 lb 13.1 oz)   SpO2 98%   Breastfeeding? No   BMI 31.59 kg/m²       Pain/Caitlyn Score: Caitlyn Score: 10 (3/21/2019 10:40 AM)        "

## 2019-03-21 NOTE — TRANSFER OF CARE
"Anesthesia Transfer of Care Note    Patient: Susan Puente    Procedure(s) Performed: Procedure(s) (LRB):  Biopsy-bone marrow (Left)    Patient location: PACU    Anesthesia Type: general    Transport from OR: Transported from OR on 6-10 L/min O2 by face mask with adequate spontaneous ventilation    Post pain: adequate analgesia    Post assessment: no apparent anesthetic complications and tolerated procedure well    Post vital signs: stable    Level of consciousness: awake, alert and oriented    Nausea/Vomiting: no nausea/vomiting    Complications: none    Transfer of care protocol was followed      Last vitals:   Visit Vitals  /63 (BP Location: Right arm, Patient Position: Lying)   Pulse 67   Temp 36.6 °C (97.8 °F) (Oral)   Resp 16   Ht 5' 5" (1.651 m)   Wt 86.1 kg (189 lb 13.1 oz)   SpO2 98%   Breastfeeding? No   BMI 31.59 kg/m²     "

## 2019-03-21 NOTE — INTERVAL H&P NOTE
The patient has been examined and the H&P has been reviewed:    I concur with the findings and no changes have occurred since H&P was written.    Anesthesia/Surgery risks, benefits and alternative options discussed and understood by patient/family.          Active Hospital Problems    Diagnosis  POA    MDS (myelodysplastic syndrome) [D46.9]  Yes      Resolved Hospital Problems   No resolved problems to display.

## 2019-03-21 NOTE — DISCHARGE INSTRUCTIONS
Discharge Instructions for patients having a Bone Marrow Aspiration / Biopsy    Keep bandage in place for 24 hours following procedure.   - Do not shower or take a tube bath during this time. (You may take a sponge bath.)  - Call the nurse or physician for excessive bleeding or pain.  - You may take Tylenol as needed for pain.     You have received medication to sedate you.  -Do not drive a car or operate heavy machinery for the rest of the day.  You may resume other activities as tolerated.    You can call [phone] for any problems during the hours of 8:00 AM- 5:00 PM.    For an emergency after 5;00 PM you can call [phone] and have the  page the Hematologist / Oncologist on call.         Discharge Instructions: After Your Surgery  Youve just had surgery. During surgery, you were given medicine called anesthesia to keep you relaxed and free of pain. After surgery, you may have some pain or nausea. This is common. Here are some tips for feeling better and getting well after surgery.     Stay on schedule with your medicine.   Going home  Your healthcare provider will show you how to take care of yourself when you go home. He or she will also answer your questions. Have an adult family member or friend drive you home. For the first 24 hours after your surgery:  · Do not drive or use heavy equipment.  · Do not make important decisions or sign legal papers.  · Do not drink alcohol.  · Have someone stay with you, if needed. He or she can watch for problems and help keep you safe.  Be sure to go to all follow-up visits with your healthcare provider. And rest after your surgery for as long as your healthcare provider tells you to.  Coping with pain  If you have pain after surgery, pain medicine will help you feel better. Take it as told, before pain becomes severe. Also, ask your healthcare provider or pharmacist about other ways to control pain. This might be with heat, ice, or relaxation. And follow any  other instructions your surgeon or nurse gives you.  Tips for taking pain medicine  To get the best relief possible, remember these points:  · Pain medicines can upset your stomach. Taking them with a little food may help.  · Most pain relievers taken by mouth need at least 20 to 30 minutes to start to work.  · Taking medicine on a schedule can help you remember to take it. Try to time your medicine so that you can take it before starting an activity. This might be before you get dressed, go for a walk, or sit down for dinner.  · Constipation is a common side effect of pain medicines. Call your healthcare provider before taking any medicines such as laxatives or stool softeners to help ease constipation. Also ask if you should skip any foods. Drinking lots of fluids and eating foods such as fruits and vegetables that are high in fiber can also help. Remember, do not take laxatives unless your surgeon has prescribed them.  · Drinking alcohol and taking pain medicine can cause dizziness and slow your breathing. It can even be deadly. Do not drink alcohol while taking pain medicine.  · Pain medicine can make you react more slowly to things. Do not drive or run machinery while taking pain medicine.  Your healthcare provider may tell you to take acetaminophen to help ease your pain. Ask him or her how much you are supposed to take each day. Acetaminophen or other pain relievers may interact with your prescription medicines or other over-the-counter (OTC) medicines. Some prescription medicines have acetaminophen and other ingredients. Using both prescription and OTC acetaminophen for pain can cause you to overdose. Read the labels on your OTC medicines with care. This will help you to clearly know the list of ingredients, how much to take, and any warnings. It may also help you not take too much acetaminophen. If you have questions or do not understand the information, ask your pharmacist or healthcare provider to explain  it to you before you take the OTC medicine.  Managing nausea  Some people have an upset stomach after surgery. This is often because of anesthesia, pain, or pain medicine, or the stress of surgery. These tips will help you handle nausea and eat healthy foods as you get better. If you were on a special food plan before surgery, ask your healthcare provider if you should follow it while you get better. These tips may help:  · Do not push yourself to eat. Your body will tell you when to eat and how much.  · Start off with clear liquids and soup. They are easier to digest.  · Next try semi-solid foods, such as mashed potatoes, applesauce, and gelatin, as you feel ready.  · Slowly move to solid foods. Dont eat fatty, rich, or spicy foods at first.  · Do not force yourself to have 3 large meals a day. Instead eat smaller amounts more often.  · Take pain medicines with a small amount of solid food, such as crackers or toast, to avoid nausea.     Call your surgeon if  · You still have pain an hour after taking medicine. The medicine may not be strong enough.  · You feel too sleepy, dizzy, or groggy. The medicine may be too strong.  · You have side effects like nausea, vomiting, or skin changes, such as rash, itching, or hives.       If you have obstructive sleep apnea  You were given anesthesia medicine during surgery to keep you comfortable and free of pain. After surgery, you may have more apnea spells because of this medicine and other medicines you were given. The spells may last longer than usual.   At home:  · Keep using the continuous positive airway pressure (CPAP) device when you sleep. Unless your healthcare provider tells you not to, use it when you sleep, day or night. CPAP is a common device used to treat obstructive sleep apnea.  · Talk with your provider before taking any pain medicine, muscle relaxants, or sedatives. Your provider will tell you about the possible dangers of taking these medicines.  Date  Last Reviewed: 12/1/2016  © 8590-2005 [employer]. [address] [place] [address], [place] [address]. All rights reserved. This information is not intended as a substitute for professional medical care. Always follow your healthcare professional's instructions.

## 2019-03-21 NOTE — PLAN OF CARE
Patient and patient's friend received discharge instructions and prescriptions.  Patient and patient's friend verbalized understanding of all instructions given and all questions were addressed prior to patient's discharge.  Patient's vital signs are stable and within patient's baseline.  Patient tolerated clear liquids PO.  Patient voided without difficulty in post-op.  Patient denies pain.  Patient denies nausea and vomiting at this time.  Patient meets all criteria for discharge at this time.  All required consents present in patient's chart upon patient's discharge.

## 2019-03-21 NOTE — DISCHARGE SUMMARY
[facility]  Hematology  Bone Marrow Transplant  Discharge Summary      Patient Name: Susan Puente  MRN: [id number]  Admission Date: 3/21/2019  Hospital Length of Stay: 0 days  Discharge Date and Time: No discharge date for patient encounter.  Attending Physician: Gita Valdes MD   Discharging Provider: Altagracia Cornejo NP  Primary Care Provider: Claudia Rapp MD    HPI: Patient with history of MDS who presents for planned BMBx.    Procedure(s) (LRB):  Biopsy-bone marrow (Left)     Hospital Course: Patient presented for planned BMBx under sedation. Tolerated procedure well.     Pending Diagnostic Studies:     None        Final Active Diagnoses:    Diagnosis Date Noted POA    MDS (myelodysplastic syndrome) [D46.9] 06/07/2018 Yes      Problems Resolved During this Admission:      Discharged Condition: stable    Disposition: Home or Self Care    Follow Up:    Patient Instructions:      Diet Adult Regular     Notify your health care provider if you experience any of the following:  temperature >100.4     Notify your health care provider if you experience any of the following:  severe uncontrolled pain     Notify your health care provider if you experience any of the following:  redness, tenderness, or signs of infection (pain, swelling, redness, odor or green/yellow discharge around incision site)     Remove dressing in 24 hours     Activity as tolerated     Medications:  Reconciled Home Medications:      Medication List      ASK your doctor about these medications    alirocumab 75 mg/mL Pnij  Commonly known as:  PRALUENT PEN  Inject 1 mL (75 mg total) into the skin every 14 (fourteen) days.     aspirin 81 MG EC tablet  Commonly known as:  ECOTRIN  Take 81 mg by mouth once daily.     b complex vitamins capsule  Take 1 capsule by mouth once daily.     blood sugar diagnostic Strp  1 each by Misc.(Non-Drug; Combo Route) route once daily.     * blood-glucose meter kit  Use as instructed     *  TRUE METRIX GLUCOSE METER Misc  Generic drug:  blood-glucose meter     * citalopram 20 MG tablet  Commonly known as:  CELEXA  Take 1 tablet (20 mg total) by mouth once daily.     * citalopram 20 MG tablet  Commonly known as:  CELEXA  TAKE ONE TABLET BY MOUTH EVERY DAY     fexofenadine 30 mg Tbdl  Take by mouth.     fish oil-omega-3 fatty acids 300-1,000 mg capsule  Take 4 g by mouth nightly.     lancets 28 gauge Misc  Commonly known as:  FREESTYLE LANCETS  1 lancet by Misc.(Non-Drug; Combo Route) route once daily.     lisinopril-hydrochlorothiazide 20-12.5 mg per tablet  Commonly known as:  PRINZIDE,ZESTORETIC  Take 2 tablets by mouth once daily.     magnesium 30 mg Tab  Take by mouth once.     metFORMIN 500 MG 24 hr tablet  Commonly known as:  GLUCOPHAGE-XR  Take 1 tablet (500 mg total) by mouth daily with breakfast.     NICOTROL 10 mg Crtg  Generic drug:  nicotine  INHALE ONE PUFF INTO THE MOUTH AS NEEDED MAXIMUM 6 CARTRIDGES/DAY.     pantoprazole 40 MG tablet  Commonly known as:  PROTONIX  Take 1 tablet (40 mg total) by mouth once daily.     potassium 99 mg Tab  Take by mouth once.     predniSONE 5 MG tablet  Commonly known as:  DELTASONE  Take 2 tablets by mouth as directed by MD in clinic.     TYLENOL ARTHRITIS PAIN 650 MG Tbsr  Generic drug:  acetaminophen  Take 650 mg by mouth every 8 (eight) hours as needed (for pain).     walker Misc  1 each by Misc.(Non-Drug; Combo Route) route once daily at 6am.         * This list has 4 medication(s) that are the same as other medications prescribed for you. Read the directions carefully, and ask your doctor or other care provider to review them with you.                Altagracia Cornejo, NP  Bone Marrow Transplant  [facility]

## 2019-03-21 NOTE — PLAN OF CARE
Patient arrived from OR with ABHIJIT Davidson CRNA.  Patient stable.  Report received at this time.  Assumed care of patient at this time.

## 2019-03-21 NOTE — PROCEDURES
PROCEDURE NOTE:  Date of Procedure: 03/21/2019  Bone Marrow Biopsy and Aspiration  Indication: MDS  Consent: Informed consent was obtained from patient.  Timeout: Done and documented.  Position: Right lateral  Site: Left posterior illiac crest.  Prep: Betadine.  Needle used: 11 gauge Jamshidi needle.  Anesthetic: 1% lidocaine 5 cc.  Biopsy: The biopsy needle was introduced into the marrow cavity and an aspirate was obtained without complications and sent for flow cytometry, cytogenetics, and MDS FISH. Core biopsy obtained without difficulty and sent for routine histologic examination.  Complications: None.  Disposition: The patient was discharged home per anesthesia protocol.  Blood loss: Minimal.     Altagracia Cornejo, FNP  Hematology/Oncology/Bone Marrow Transplant      Deepti Valdes MD

## 2019-03-22 LAB
BONE MARROW WRIGHT STAIN COMMENT: NORMAL
DNA/RNA EXTRACT AND HOLD RESULT: NORMAL
DNA/RNA EXTRACTION: NORMAL
EXHR SPECIMEN TYPE: NORMAL

## 2019-03-28 ENCOUNTER — LAB VISIT (OUTPATIENT)
Dept: LAB | Facility: HOSPITAL | Age: 69
End: 2019-03-28
Payer: MEDICARE

## 2019-03-28 ENCOUNTER — PATIENT MESSAGE (OUTPATIENT)
Dept: HEMATOLOGY/ONCOLOGY | Facility: CLINIC | Age: 69
End: 2019-03-28

## 2019-03-28 DIAGNOSIS — M10.9 ACUTE GOUT INVOLVING TOE, UNSPECIFIED CAUSE, UNSPECIFIED LATERALITY: Primary | ICD-10-CM

## 2019-03-28 DIAGNOSIS — D46.9 MDS (MYELODYSPLASTIC SYNDROME): ICD-10-CM

## 2019-03-28 DIAGNOSIS — D46.C MDS (MYELODYSPLASTIC SYNDROME) WITH 5Q DELETION: ICD-10-CM

## 2019-03-28 LAB
ABO + RH BLD: NORMAL
ALBUMIN SERPL BCP-MCNC: 3.7 G/DL (ref 3.5–5.2)
ALP SERPL-CCNC: 86 U/L (ref 55–135)
ALT SERPL W/O P-5'-P-CCNC: 36 U/L (ref 10–44)
ANION GAP SERPL CALC-SCNC: 12 MMOL/L (ref 8–16)
AST SERPL-CCNC: 19 U/L (ref 10–40)
BASOPHILS # BLD AUTO: 0.03 K/UL (ref 0–0.2)
BASOPHILS NFR BLD: 0.9 % (ref 0–1.9)
BILIRUB SERPL-MCNC: 0.7 MG/DL (ref 0.1–1)
BLD GP AB SCN CELLS X3 SERPL QL: NORMAL
BUN SERPL-MCNC: 23 MG/DL (ref 8–23)
CALCIUM SERPL-MCNC: 10.1 MG/DL (ref 8.7–10.5)
CHLORIDE SERPL-SCNC: 102 MMOL/L (ref 95–110)
CHROM BANDING METHOD: NORMAL
CHROMOSOME ANALYSIS BM ADDITIONAL INFORMATION: NORMAL
CHROMOSOME ANALYSIS BM RELEASED BY: NORMAL
CHROMOSOME ANALYSIS BM RESULT SUMMARY: NORMAL
CLINICAL CYTOGENETICIST REVIEW: NORMAL
CO2 SERPL-SCNC: 28 MMOL/L (ref 23–29)
CREAT SERPL-MCNC: 1.2 MG/DL (ref 0.5–1.4)
DIFFERENTIAL METHOD: ABNORMAL
EOSINOPHIL # BLD AUTO: 0.1 K/UL (ref 0–0.5)
EOSINOPHIL NFR BLD: 4.3 % (ref 0–8)
ERYTHROCYTE [DISTWIDTH] IN BLOOD BY AUTOMATED COUNT: 21.8 % (ref 11.5–14.5)
EST. GFR  (AFRICAN AMERICAN): 53.7 ML/MIN/1.73 M^2
EST. GFR  (NON AFRICAN AMERICAN): 46.5 ML/MIN/1.73 M^2
GLUCOSE SERPL-MCNC: 101 MG/DL (ref 70–110)
HCT VFR BLD AUTO: 27.2 % (ref 37–48.5)
HGB BLD-MCNC: 9.1 G/DL (ref 12–16)
IMM GRANULOCYTES # BLD AUTO: 0.01 K/UL (ref 0–0.04)
IMM GRANULOCYTES NFR BLD AUTO: 0.3 % (ref 0–0.5)
KARYOTYP MAR: NORMAL
LYMPHOCYTES # BLD AUTO: 1.6 K/UL (ref 1–4.8)
LYMPHOCYTES NFR BLD: 48 % (ref 18–48)
MCH RBC QN AUTO: 31.2 PG (ref 27–31)
MCHC RBC AUTO-ENTMCNC: 33.5 G/DL (ref 32–36)
MCV RBC AUTO: 93 FL (ref 82–98)
MONOCYTES # BLD AUTO: 0.3 K/UL (ref 0.3–1)
MONOCYTES NFR BLD: 7.7 % (ref 4–15)
NEUTROPHILS # BLD AUTO: 1.3 K/UL (ref 1.8–7.7)
NEUTROPHILS NFR BLD: 38.8 % (ref 38–73)
NRBC BLD-RTO: 0 /100 WBC
PLATELET # BLD AUTO: 55 K/UL (ref 150–350)
PMV BLD AUTO: 11.4 FL (ref 9.2–12.9)
POTASSIUM SERPL-SCNC: 3.5 MMOL/L (ref 3.5–5.1)
PROT SERPL-MCNC: 7.2 G/DL (ref 6–8.4)
RBC # BLD AUTO: 2.92 M/UL (ref 4–5.4)
REASON FOR REFERRAL (NARRATIVE): NORMAL
REF LAB TEST METHOD: NORMAL
SODIUM SERPL-SCNC: 142 MMOL/L (ref 136–145)
SPECIMEN SOURCE: NORMAL
SPECIMEN: NORMAL
WBC # BLD AUTO: 3.25 K/UL (ref 3.9–12.7)

## 2019-03-28 PROCEDURE — 36415 COLL VENOUS BLD VENIPUNCTURE: CPT | Mod: HCNC

## 2019-03-28 PROCEDURE — 80053 COMPREHEN METABOLIC PANEL: CPT | Mod: HCNC

## 2019-03-28 PROCEDURE — 86901 BLOOD TYPING SEROLOGIC RH(D): CPT | Mod: HCNC

## 2019-03-28 PROCEDURE — 85025 COMPLETE CBC W/AUTO DIFF WBC: CPT | Mod: HCNC

## 2019-03-28 RX ORDER — PREDNISONE 20 MG/1
20 TABLET ORAL DAILY
Qty: 7 TABLET | Refills: 0 | Status: SHIPPED | OUTPATIENT
Start: 2019-03-28 | End: 2019-04-04

## 2019-04-03 ENCOUNTER — OFFICE VISIT (OUTPATIENT)
Dept: HEMATOLOGY/ONCOLOGY | Facility: CLINIC | Age: 69
End: 2019-04-03
Payer: MEDICARE

## 2019-04-03 ENCOUNTER — LAB VISIT (OUTPATIENT)
Dept: LAB | Facility: HOSPITAL | Age: 69
End: 2019-04-03
Attending: INTERNAL MEDICINE
Payer: MEDICARE

## 2019-04-03 VITALS
SYSTOLIC BLOOD PRESSURE: 163 MMHG | TEMPERATURE: 98 F | OXYGEN SATURATION: 95 % | HEART RATE: 84 BPM | WEIGHT: 189.38 LBS | DIASTOLIC BLOOD PRESSURE: 73 MMHG | RESPIRATION RATE: 20 BRPM | BODY MASS INDEX: 31.51 KG/M2

## 2019-04-03 DIAGNOSIS — N18.30 CONTROLLED TYPE 2 DIABETES MELLITUS WITH STAGE 3 CHRONIC KIDNEY DISEASE, WITHOUT LONG-TERM CURRENT USE OF INSULIN: ICD-10-CM

## 2019-04-03 DIAGNOSIS — E11.22 CONTROLLED TYPE 2 DIABETES MELLITUS WITH STAGE 3 CHRONIC KIDNEY DISEASE, WITHOUT LONG-TERM CURRENT USE OF INSULIN: ICD-10-CM

## 2019-04-03 DIAGNOSIS — D46.9 MDS (MYELODYSPLASTIC SYNDROME): ICD-10-CM

## 2019-04-03 DIAGNOSIS — D46.C MDS (MYELODYSPLASTIC SYNDROME) WITH 5Q DELETION: Primary | ICD-10-CM

## 2019-04-03 DIAGNOSIS — N18.30 STAGE 3 CHRONIC KIDNEY DISEASE: ICD-10-CM

## 2019-04-03 DIAGNOSIS — D46.C MDS (MYELODYSPLASTIC SYNDROME) WITH 5Q DELETION: ICD-10-CM

## 2019-04-03 DIAGNOSIS — I10 ESSENTIAL HYPERTENSION: ICD-10-CM

## 2019-04-03 LAB
ABO + RH BLD: NORMAL
ALBUMIN SERPL BCP-MCNC: 3.7 G/DL (ref 3.5–5.2)
ALP SERPL-CCNC: 77 U/L (ref 55–135)
ALT SERPL W/O P-5'-P-CCNC: 36 U/L (ref 10–44)
ANION GAP SERPL CALC-SCNC: 12 MMOL/L (ref 8–16)
AST SERPL-CCNC: 20 U/L (ref 10–40)
BASOPHILS # BLD AUTO: 0 K/UL (ref 0–0.2)
BASOPHILS NFR BLD: 0 % (ref 0–1.9)
BILIRUB SERPL-MCNC: 0.6 MG/DL (ref 0.1–1)
BLD GP AB SCN CELLS X3 SERPL QL: NORMAL
BUN SERPL-MCNC: 26 MG/DL (ref 8–23)
CALCIUM SERPL-MCNC: 9.6 MG/DL (ref 8.7–10.5)
CHLORIDE SERPL-SCNC: 102 MMOL/L (ref 95–110)
CO2 SERPL-SCNC: 27 MMOL/L (ref 23–29)
CREAT SERPL-MCNC: 0.9 MG/DL (ref 0.5–1.4)
DIFFERENTIAL METHOD: ABNORMAL
EOSINOPHIL # BLD AUTO: 0.1 K/UL (ref 0–0.5)
EOSINOPHIL NFR BLD: 3.1 % (ref 0–8)
ERYTHROCYTE [DISTWIDTH] IN BLOOD BY AUTOMATED COUNT: 21.6 % (ref 11.5–14.5)
EST. GFR  (AFRICAN AMERICAN): >60 ML/MIN/1.73 M^2
EST. GFR  (NON AFRICAN AMERICAN): >60 ML/MIN/1.73 M^2
GLUCOSE SERPL-MCNC: 102 MG/DL (ref 70–110)
HCT VFR BLD AUTO: 23.9 % (ref 37–48.5)
HGB BLD-MCNC: 8 G/DL (ref 12–16)
IMM GRANULOCYTES # BLD AUTO: 0.03 K/UL (ref 0–0.04)
IMM GRANULOCYTES NFR BLD AUTO: 0.9 % (ref 0–0.5)
LYMPHOCYTES # BLD AUTO: 0.9 K/UL (ref 1–4.8)
LYMPHOCYTES NFR BLD: 28.1 % (ref 18–48)
MCH RBC QN AUTO: 31 PG (ref 27–31)
MCHC RBC AUTO-ENTMCNC: 33.5 G/DL (ref 32–36)
MCV RBC AUTO: 93 FL (ref 82–98)
MONOCYTES # BLD AUTO: 0.2 K/UL (ref 0.3–1)
MONOCYTES NFR BLD: 6.6 % (ref 4–15)
NEUTROPHILS # BLD AUTO: 2 K/UL (ref 1.8–7.7)
NEUTROPHILS NFR BLD: 61.3 % (ref 38–73)
NRBC BLD-RTO: 0 /100 WBC
PLATELET # BLD AUTO: 59 K/UL (ref 150–350)
PMV BLD AUTO: 11.2 FL (ref 9.2–12.9)
POTASSIUM SERPL-SCNC: 3.5 MMOL/L (ref 3.5–5.1)
PROT SERPL-MCNC: 6.9 G/DL (ref 6–8.4)
RBC # BLD AUTO: 2.58 M/UL (ref 4–5.4)
SODIUM SERPL-SCNC: 141 MMOL/L (ref 136–145)
WBC # BLD AUTO: 3.2 K/UL (ref 3.9–12.7)

## 2019-04-03 PROCEDURE — 3077F SYST BP >= 140 MM HG: CPT | Mod: HCNC,CPTII,S$GLB, | Performed by: INTERNAL MEDICINE

## 2019-04-03 PROCEDURE — 36415 COLL VENOUS BLD VENIPUNCTURE: CPT | Mod: HCNC

## 2019-04-03 PROCEDURE — 85025 COMPLETE CBC W/AUTO DIFF WBC: CPT | Mod: HCNC

## 2019-04-03 PROCEDURE — 99215 OFFICE O/P EST HI 40 MIN: CPT | Mod: HCNC,S$GLB,, | Performed by: INTERNAL MEDICINE

## 2019-04-03 PROCEDURE — 1101F PT FALLS ASSESS-DOCD LE1/YR: CPT | Mod: HCNC,CPTII,S$GLB, | Performed by: INTERNAL MEDICINE

## 2019-04-03 PROCEDURE — 86850 RBC ANTIBODY SCREEN: CPT | Mod: HCNC

## 2019-04-03 PROCEDURE — 99999 PR PBB SHADOW E&M-EST. PATIENT-LVL III: ICD-10-PCS | Mod: PBBFAC,HCNC,, | Performed by: INTERNAL MEDICINE

## 2019-04-03 PROCEDURE — 3077F PR MOST RECENT SYSTOLIC BLOOD PRESSURE >= 140 MM HG: ICD-10-PCS | Mod: HCNC,CPTII,S$GLB, | Performed by: INTERNAL MEDICINE

## 2019-04-03 PROCEDURE — 80053 COMPREHEN METABOLIC PANEL: CPT | Mod: HCNC

## 2019-04-03 PROCEDURE — 3078F DIAST BP <80 MM HG: CPT | Mod: HCNC,CPTII,S$GLB, | Performed by: INTERNAL MEDICINE

## 2019-04-03 PROCEDURE — 3078F PR MOST RECENT DIASTOLIC BLOOD PRESSURE < 80 MM HG: ICD-10-PCS | Mod: HCNC,CPTII,S$GLB, | Performed by: INTERNAL MEDICINE

## 2019-04-03 PROCEDURE — 99999 PR PBB SHADOW E&M-EST. PATIENT-LVL III: CPT | Mod: PBBFAC,HCNC,, | Performed by: INTERNAL MEDICINE

## 2019-04-03 PROCEDURE — 99215 PR OFFICE/OUTPT VISIT, EST, LEVL V, 40-54 MIN: ICD-10-PCS | Mod: HCNC,S$GLB,, | Performed by: INTERNAL MEDICINE

## 2019-04-03 PROCEDURE — 1101F PR PT FALLS ASSESS DOC 0-1 FALLS W/OUT INJ PAST YR: ICD-10-PCS | Mod: HCNC,CPTII,S$GLB, | Performed by: INTERNAL MEDICINE

## 2019-04-04 NOTE — PROGRESS NOTES
Subjective:       Patient ID: Susan Puente is a 68 y.o. female.    Chief Complaint: Results (labs)    Patient presents today for follow up of her history of MDS 5 q del syndrome.  Revlimid had been stopped since June 2017 after she developed pancytopenia while on Revlimid (required desensitization). CBC was normal for almost 1 year and marrow was negative for del5q. Bone marrow biopsy repeat from 6/2018 showed relapsed 5q minus MDS without new or additional mutations. Revlimid started at 2.5 mg daily with prednisone to prevent allergic reaction titrated to a goal of 10mg daily Revlimid. Hospital admission 3/12-3/17/19 for unresponsive event after blood transfusion, found to be profoundly pancytopenic at admission. Since hospital admission Revlimid has been stopped and CBC has improved. Repeat marrow March 2019 shows persistent MDS with 5q minus.    Oncology History   Ms. Puente is a 68 year old female with hx of DM2, peripheral vascular disease, tobacco use, CAD, hyperlipidemia, hypertension who was hospitalized 11/10/16 for anemia. hgb 5.3  MCH 43.4 with normal WBC and platelets. Patient had normal iron stores. She was transfused 3 units of PRBCs and discharged home with hgb 8.7 on 11/11/16. She was referred for further evalutation of her anemia. On 12/16/16 patient had a normal SPEP and immunofixation. Slightly elevated kappa light chains with normal ration. Elevated vitamin b12, normal folate, JAYDEN was positive with a low titer and negative profile. Patient had a bone marrow biopsy 1/5/16 which showed the core biopsy is normocellular for age (40%); however, megakaryocytes are increased and show frequent small, hypolobated forms. Additionally, a subset of the neutrophils are hypogranular. Blasts are not increased by either morphology (1.2%) or in the corresponding flow cytometric analysis. Fish detects a 5q deletion in 54.5% of nuclei. Cytogenetics reported 20 metaphases, 2 metaphases were normal  and 18 metaphases had a 5q deletion. No additional cytogenetic abnormalities were detected. Findings consistent with 5q deletion syndrome.     Patient had a delay in obtaining Revlimid 10 mg daily but did start taking the medication 2/8/17. On 2/9/17 patient developed a diffuse maculopapular rash throughout scalp arms, legs and torso. She states she had no stridor or wheezing. She discontinued medication 2/15/17. Went to allergist who provided references on desensitization so that patient could resume Revlimid. Unfortunately developed pancytopenia and Revlimid stopped 6/16/17. She had a repeat BMBX  performed 6/8/17 and showed a hypercellular marrow (60%) continued atypia in the granulocytes and megakaryocytes were noted.  Additionally, there is erythroid atypia present. No increase in blasts. Cytogenetics are normal and MDS FISH is negative, failing to show 5 q minus. NGS should no significant molecular mutations.  Anemia work up revealed bienvenido negative hemolysis.      Review of Systems   Constitutional: Positive for fatigue. Negative for fever.   HENT: Negative for sinus congestion or rhinorrhea. Negative for ear pain, mouth sores, nosebleeds and trouble swallowing.    Eyes:      Respiratory: Negative for cough, shortness of breath and wheezing.    Cardiovascular: Negative for chest pain and leg swelling.   Gastrointestinal: Negative for abdominal distention, abdominal pain, blood in stool, constipation, diarrhea, nausea and vomiting.        Improved with Imodium   Endocrine: Negative for polyphagia and polyuria.   Genitourinary: Negative for dysuria, hematuria and urgency.   Musculoskeletal: Positive for arthralgias. Negative for myalgias.   Skin: Negative for color change, pallor and rash.   Neurological: Positive for numbness (to balls of feet; likely secondary to DM2). Negative for dizziness, weakness, light-headedness and headaches.   Hematological: Negative for adenopathy. Does not bruise/bleed easily.    Psychiatric/Behavioral: Negative for agitation and behavioral problems.       Objective:      Physical Exam   Constitutional: She is oriented to person, place, and time. She appears well-developed and well-nourished.   HENT:   Head: Normocephalic and atraumatic.   Right Ear: External ear normal.   Left Ear: External ear normal.   Mouth/Throat: Oropharynx is clear and moist. No oropharyngeal exudate.   Eyes: Conjunctivae and EOM are normal. Pupils are equal, round, and reactive to light. Right eye exhibits no discharge. Left eye exhibits no discharge.   Neck: Normal range of motion. Neck supple.   Cardiovascular: Normal rate, regular rhythm, normal heart sounds and intact distal pulses.   No murmur heard.  Pulmonary/Chest: Effort normal and breath sounds normal. No respiratory distress. She has no wheezes.   Abdominal: Soft. Bowel sounds are normal. She exhibits no distension and no mass. There is no tenderness.   Musculoskeletal: Normal range of motion. She exhibits no edema.   Lymphadenopathy:     She has no cervical adenopathy.   Neurological: She is alert and oriented to person, place, and time.   Skin: Skin is warm and dry. No rash noted.   Psychiatric: She has a normal mood and affect. Her behavior is normal. Judgment and thought content normal.   Nursing note and vitals reviewed.      Assessment:       1. MDS (myelodysplastic syndrome) with 5q deletion    2. Stage 3 chronic kidney disease    3. Controlled type 2 diabetes mellitus with stage 3 chronic kidney disease, without long-term current use of insulin    4. Essential hypertension        Plan:     MDS/Pancytopenia  Initially with 5 q minus syndrome and treated with Revlimid. Developed allergy and was then desensitized. Soon after developed pancytopenia and Revlimid held since June 2017.  BMBX,6/8/17, had normal cytogenetics. Repeat marrow from 6/7/18 shows relapsed 5q minus. Discussed treatment options of HMA therapy or repeat trial of Revlimid and  patient wished to proceed with Revlimid   Revlimid stopped  Hold all at this time to allow for CBC recovery  Next planned therapy is vidaza    Stage 3 CKD  - stable; continue to monitor with visits    DM2  - management per PCP; BG WNL today  - continue metformin; educated to perform daily foot checks with neuropathy to feet    HTN  - management per PCP  - continue treatment with lisinopril-HCTZ    CAD  - attempting to get praluent for HLD per PCP; intolerant to statins; on ASA    Anxiety/Depression  She reports improved mood now on Celexa. Continue.     F/U  Weekly CBC with type & screen  Return visit in 1 month with CBC, CMP, and type and screen

## 2019-04-11 ENCOUNTER — LAB VISIT (OUTPATIENT)
Dept: LAB | Facility: HOSPITAL | Age: 69
End: 2019-04-11
Attending: INTERNAL MEDICINE
Payer: MEDICARE

## 2019-04-11 DIAGNOSIS — D46.C MDS (MYELODYSPLASTIC SYNDROME) WITH 5Q DELETION: ICD-10-CM

## 2019-04-11 LAB
ABO + RH BLD: NORMAL
BASOPHILS # BLD AUTO: 0.01 K/UL (ref 0–0.2)
BASOPHILS NFR BLD: 0.3 % (ref 0–1.9)
BLD GP AB SCN CELLS X3 SERPL QL: NORMAL
DIFFERENTIAL METHOD: ABNORMAL
EOSINOPHIL # BLD AUTO: 0.1 K/UL (ref 0–0.5)
EOSINOPHIL NFR BLD: 1.7 % (ref 0–8)
ERYTHROCYTE [DISTWIDTH] IN BLOOD BY AUTOMATED COUNT: 22 % (ref 11.5–14.5)
HCT VFR BLD AUTO: 21.8 % (ref 37–48.5)
HGB BLD-MCNC: 7.3 G/DL (ref 12–16)
IMM GRANULOCYTES # BLD AUTO: 0.02 K/UL (ref 0–0.04)
IMM GRANULOCYTES NFR BLD AUTO: 0.6 % (ref 0–0.5)
LYMPHOCYTES # BLD AUTO: 1.2 K/UL (ref 1–4.8)
LYMPHOCYTES NFR BLD: 35.1 % (ref 18–48)
MCH RBC QN AUTO: 31.7 PG (ref 27–31)
MCHC RBC AUTO-ENTMCNC: 33.5 G/DL (ref 32–36)
MCV RBC AUTO: 95 FL (ref 82–98)
MONOCYTES # BLD AUTO: 0.3 K/UL (ref 0.3–1)
MONOCYTES NFR BLD: 8.8 % (ref 4–15)
NEUTROPHILS # BLD AUTO: 1.9 K/UL (ref 1.8–7.7)
NEUTROPHILS NFR BLD: 53.5 % (ref 38–73)
NRBC BLD-RTO: 0 /100 WBC
PLATELET # BLD AUTO: 65 K/UL (ref 150–350)
PMV BLD AUTO: 12 FL (ref 9.2–12.9)
RBC # BLD AUTO: 2.3 M/UL (ref 4–5.4)
WBC # BLD AUTO: 3.53 K/UL (ref 3.9–12.7)

## 2019-04-11 PROCEDURE — 36415 COLL VENOUS BLD VENIPUNCTURE: CPT | Mod: HCNC

## 2019-04-11 PROCEDURE — 85025 COMPLETE CBC W/AUTO DIFF WBC: CPT | Mod: HCNC

## 2019-04-11 PROCEDURE — 86901 BLOOD TYPING SEROLOGIC RH(D): CPT | Mod: HCNC

## 2019-04-12 ENCOUNTER — PATIENT MESSAGE (OUTPATIENT)
Dept: HEMATOLOGY/ONCOLOGY | Facility: CLINIC | Age: 69
End: 2019-04-12

## 2019-04-18 ENCOUNTER — TELEPHONE (OUTPATIENT)
Dept: HEMATOLOGY/ONCOLOGY | Facility: CLINIC | Age: 69
End: 2019-04-18

## 2019-04-18 ENCOUNTER — TELEPHONE (OUTPATIENT)
Dept: INFUSION THERAPY | Facility: HOSPITAL | Age: 69
End: 2019-04-18

## 2019-04-18 ENCOUNTER — INFUSION (OUTPATIENT)
Dept: INFUSION THERAPY | Facility: HOSPITAL | Age: 69
End: 2019-04-18
Attending: INTERNAL MEDICINE
Payer: MEDICARE

## 2019-04-18 VITALS
RESPIRATION RATE: 20 BRPM | TEMPERATURE: 98 F | HEART RATE: 77 BPM | DIASTOLIC BLOOD PRESSURE: 62 MMHG | SYSTOLIC BLOOD PRESSURE: 137 MMHG | OXYGEN SATURATION: 98 %

## 2019-04-18 DIAGNOSIS — D64.9 ANEMIA: Primary | ICD-10-CM

## 2019-04-18 DIAGNOSIS — D46.C MDS (MYELODYSPLASTIC SYNDROME) WITH 5Q DELETION: ICD-10-CM

## 2019-04-18 DIAGNOSIS — D63.0 ANEMIA IN NEOPLASTIC DISEASE: Primary | ICD-10-CM

## 2019-04-18 DIAGNOSIS — D63.0 ANEMIA IN NEOPLASTIC DISEASE: ICD-10-CM

## 2019-04-18 PROCEDURE — P9040 RBC LEUKOREDUCED IRRADIATED: HCPCS | Mod: HCNC

## 2019-04-18 PROCEDURE — 86920 COMPATIBILITY TEST SPIN: CPT | Mod: HCNC

## 2019-04-18 PROCEDURE — 36430 TRANSFUSION BLD/BLD COMPNT: CPT | Mod: HCNC

## 2019-04-18 PROCEDURE — 25000003 PHARM REV CODE 250: Mod: HCNC | Performed by: NURSE PRACTITIONER

## 2019-04-18 RX ORDER — ACETAMINOPHEN 325 MG/1
650 TABLET ORAL
Status: COMPLETED | OUTPATIENT
Start: 2019-04-18 | End: 2019-04-18

## 2019-04-18 RX ORDER — DIPHENHYDRAMINE HCL 25 MG
25 CAPSULE ORAL
Status: DISCONTINUED | OUTPATIENT
Start: 2019-04-18 | End: 2019-04-18 | Stop reason: HOSPADM

## 2019-04-18 RX ORDER — DIPHENHYDRAMINE HCL 25 MG
25 CAPSULE ORAL
Status: CANCELLED | OUTPATIENT
Start: 2019-04-18

## 2019-04-18 RX ORDER — HYDROCODONE BITARTRATE AND ACETAMINOPHEN 500; 5 MG/1; MG/1
TABLET ORAL ONCE
Status: COMPLETED | OUTPATIENT
Start: 2019-04-18 | End: 2019-04-18

## 2019-04-18 RX ORDER — DIPHENHYDRAMINE HCL 25 MG
25 CAPSULE ORAL
Status: COMPLETED | OUTPATIENT
Start: 2019-04-18 | End: 2019-04-18

## 2019-04-18 RX ORDER — ACETAMINOPHEN 325 MG/1
650 TABLET ORAL
Status: DISCONTINUED | OUTPATIENT
Start: 2019-04-18 | End: 2019-04-18 | Stop reason: HOSPADM

## 2019-04-18 RX ORDER — HYDROCODONE BITARTRATE AND ACETAMINOPHEN 500; 5 MG/1; MG/1
TABLET ORAL
Status: DISCONTINUED | OUTPATIENT
Start: 2019-04-18 | End: 2019-10-16 | Stop reason: HOSPADM

## 2019-04-18 RX ORDER — ACETAMINOPHEN 325 MG/1
650 TABLET ORAL
Status: CANCELLED | OUTPATIENT
Start: 2019-04-18

## 2019-04-18 RX ORDER — HYDROCODONE BITARTRATE AND ACETAMINOPHEN 500; 5 MG/1; MG/1
TABLET ORAL ONCE
Status: DISCONTINUED | OUTPATIENT
Start: 2019-04-18 | End: 2019-04-18 | Stop reason: HOSPADM

## 2019-04-18 RX ORDER — HYDROCODONE BITARTRATE AND ACETAMINOPHEN 500; 5 MG/1; MG/1
TABLET ORAL ONCE
Status: CANCELLED | OUTPATIENT
Start: 2019-04-18 | End: 2019-04-18

## 2019-04-18 RX ADMIN — DIPHENHYDRAMINE HYDROCHLORIDE 25 MG: 25 CAPSULE ORAL at 01:04

## 2019-04-18 RX ADMIN — ACETAMINOPHEN 650 MG: 325 TABLET ORAL at 01:04

## 2019-04-18 RX ADMIN — SODIUM CHLORIDE: 0.9 INJECTION, SOLUTION INTRAVENOUS at 12:04

## 2019-04-18 NOTE — PLAN OF CARE
Problem: Anemia  Goal: Anemia Symptom Improvement    Intervention: Monitor and Manage Anemia  Pt tolerated 2U PRBC well, no adverse reactions, PIV flushed SL locked removed with cath tip intact, site covered, avs printed and reviewed, rtns to clinic for MD visit 5/2/19, leaves clinic ambulatory with friend no assist needed, instructed to contact MD office with questions or concerns.

## 2019-04-25 ENCOUNTER — PATIENT MESSAGE (OUTPATIENT)
Dept: HEMATOLOGY/ONCOLOGY | Facility: CLINIC | Age: 69
End: 2019-04-25

## 2019-04-26 DIAGNOSIS — C90.00 MULTIPLE MYELOMA, REMISSION STATUS UNSPECIFIED: ICD-10-CM

## 2019-04-29 ENCOUNTER — PATIENT MESSAGE (OUTPATIENT)
Dept: HEMATOLOGY/ONCOLOGY | Facility: CLINIC | Age: 69
End: 2019-04-29

## 2019-04-29 RX ORDER — LENALIDOMIDE 10 MG/1
10 CAPSULE ORAL DAILY
Qty: 28 EACH | Refills: 0 | Status: SHIPPED | OUTPATIENT
Start: 2019-04-29 | End: 2019-05-20 | Stop reason: ALTCHOICE

## 2019-05-02 ENCOUNTER — LAB VISIT (OUTPATIENT)
Dept: LAB | Facility: HOSPITAL | Age: 69
End: 2019-05-02
Attending: INTERNAL MEDICINE
Payer: MEDICARE

## 2019-05-02 ENCOUNTER — OFFICE VISIT (OUTPATIENT)
Dept: HEMATOLOGY/ONCOLOGY | Facility: CLINIC | Age: 69
End: 2019-05-02
Payer: MEDICARE

## 2019-05-02 VITALS
DIASTOLIC BLOOD PRESSURE: 53 MMHG | TEMPERATURE: 98 F | WEIGHT: 194 LBS | SYSTOLIC BLOOD PRESSURE: 106 MMHG | HEART RATE: 89 BPM | HEIGHT: 65 IN | OXYGEN SATURATION: 95 % | BODY MASS INDEX: 32.32 KG/M2 | RESPIRATION RATE: 18 BRPM

## 2019-05-02 DIAGNOSIS — D46.C MDS (MYELODYSPLASTIC SYNDROME) WITH 5Q DELETION: ICD-10-CM

## 2019-05-02 DIAGNOSIS — D46.C MDS (MYELODYSPLASTIC SYNDROME) WITH 5Q DELETION: Primary | ICD-10-CM

## 2019-05-02 LAB
ABO + RH BLD: NORMAL
BASOPHILS # BLD AUTO: 0.02 K/UL (ref 0–0.2)
BASOPHILS NFR BLD: 0.4 % (ref 0–1.9)
BLD GP AB SCN CELLS X3 SERPL QL: NORMAL
DIFFERENTIAL METHOD: ABNORMAL
EOSINOPHIL # BLD AUTO: 0.2 K/UL (ref 0–0.5)
EOSINOPHIL NFR BLD: 3.7 % (ref 0–8)
ERYTHROCYTE [DISTWIDTH] IN BLOOD BY AUTOMATED COUNT: 20.6 % (ref 11.5–14.5)
HCT VFR BLD AUTO: 21.9 % (ref 37–48.5)
HGB BLD-MCNC: 7.1 G/DL (ref 12–16)
IMM GRANULOCYTES # BLD AUTO: 0.08 K/UL (ref 0–0.04)
IMM GRANULOCYTES NFR BLD AUTO: 1.6 % (ref 0–0.5)
LYMPHOCYTES # BLD AUTO: 1.4 K/UL (ref 1–4.8)
LYMPHOCYTES NFR BLD: 28.3 % (ref 18–48)
MCH RBC QN AUTO: 31.3 PG (ref 27–31)
MCHC RBC AUTO-ENTMCNC: 32.4 G/DL (ref 32–36)
MCV RBC AUTO: 97 FL (ref 82–98)
MONOCYTES # BLD AUTO: 0.3 K/UL (ref 0.3–1)
MONOCYTES NFR BLD: 5.9 % (ref 4–15)
NEUTROPHILS # BLD AUTO: 3 K/UL (ref 1.8–7.7)
NEUTROPHILS NFR BLD: 60.1 % (ref 38–73)
NRBC BLD-RTO: 0 /100 WBC
PLATELET # BLD AUTO: 195 K/UL (ref 150–350)
PMV BLD AUTO: 11.5 FL (ref 9.2–12.9)
RBC # BLD AUTO: 2.27 M/UL (ref 4–5.4)
WBC # BLD AUTO: 4.92 K/UL (ref 3.9–12.7)

## 2019-05-02 PROCEDURE — 3074F PR MOST RECENT SYSTOLIC BLOOD PRESSURE < 130 MM HG: ICD-10-PCS | Mod: HCNC,CPTII,S$GLB, | Performed by: INTERNAL MEDICINE

## 2019-05-02 PROCEDURE — 99999 PR PBB SHADOW E&M-EST. PATIENT-LVL III: ICD-10-PCS | Mod: PBBFAC,HCNC,, | Performed by: INTERNAL MEDICINE

## 2019-05-02 PROCEDURE — 99999 PR PBB SHADOW E&M-EST. PATIENT-LVL III: CPT | Mod: PBBFAC,HCNC,, | Performed by: INTERNAL MEDICINE

## 2019-05-02 PROCEDURE — 3078F PR MOST RECENT DIASTOLIC BLOOD PRESSURE < 80 MM HG: ICD-10-PCS | Mod: HCNC,CPTII,S$GLB, | Performed by: INTERNAL MEDICINE

## 2019-05-02 PROCEDURE — 3078F DIAST BP <80 MM HG: CPT | Mod: HCNC,CPTII,S$GLB, | Performed by: INTERNAL MEDICINE

## 2019-05-02 PROCEDURE — 99215 PR OFFICE/OUTPT VISIT, EST, LEVL V, 40-54 MIN: ICD-10-PCS | Mod: HCNC,S$GLB,, | Performed by: INTERNAL MEDICINE

## 2019-05-02 PROCEDURE — 99215 OFFICE O/P EST HI 40 MIN: CPT | Mod: HCNC,S$GLB,, | Performed by: INTERNAL MEDICINE

## 2019-05-02 PROCEDURE — 3074F SYST BP LT 130 MM HG: CPT | Mod: HCNC,CPTII,S$GLB, | Performed by: INTERNAL MEDICINE

## 2019-05-02 PROCEDURE — 1101F PR PT FALLS ASSESS DOC 0-1 FALLS W/OUT INJ PAST YR: ICD-10-PCS | Mod: HCNC,CPTII,S$GLB, | Performed by: INTERNAL MEDICINE

## 2019-05-02 PROCEDURE — 85025 COMPLETE CBC W/AUTO DIFF WBC: CPT | Mod: HCNC

## 2019-05-02 PROCEDURE — 86850 RBC ANTIBODY SCREEN: CPT | Mod: HCNC

## 2019-05-02 PROCEDURE — 1101F PT FALLS ASSESS-DOCD LE1/YR: CPT | Mod: HCNC,CPTII,S$GLB, | Performed by: INTERNAL MEDICINE

## 2019-05-02 NOTE — PROGRESS NOTES
Subjective:       Patient ID: Susan Puente is a 68 y.o. female.    Chief Complaint: No chief complaint on file.    Patient presents today for follow up of her history of MDS 5 q del syndrome.  Revlimid has been stopped since June 2017 after she developed pancytopenia while on Revlimid (required desensitization). CBC was normal for almost 1 year and marrow was negative for del5q. Bone marrow biopsy repeat from 6/2018 showed relapsed 5q minus MDS without new or additional mutations. Revlimid restarted at 2.5 mg daily with prednisone to prevent allergic reaction titrated to a goal of 10mg daily Revlimid. Hospital admission 3/12-3/17/19 for unresponsive event after blood transfusion, found to be profoundly pancytopenic at admission. Since hospital admission Revlimid has been stopped and CBC has improved. Repeat marrow March 2019 shows persistent MDS with 5q minus. CBC is stable. Visit today to discuss starting Vidaza as third line therapy for 5q minus syndrome.    Oncology History   Ms. Puente is a 68 year old female with hx of DM2, peripheral vascular disease, tobacco use, CAD, hyperlipidemia, hypertension who was hospitalized 11/10/16 for anemia. hgb 5.3  MCH 43.4 with normal WBC and platelets. Patient had normal iron stores. She was transfused 3 units of PRBCs and discharged home with hgb 8.7 on 11/11/16. She was referred for further evalutation of her anemia. On 12/16/16 patient had a normal SPEP and immunofixation. Slightly elevated kappa light chains with normal ration. Elevated vitamin b12, normal folate, JAYDEN was positive with a low titer and negative profile. Patient had a bone marrow biopsy 1/5/16 which showed the core biopsy is normocellular for age (40%); however, megakaryocytes are increased and show frequent small, hypolobated forms. Additionally, a subset of the neutrophils are hypogranular. Blasts are not increased by either morphology (1.2%) or in the corresponding flow cytometric  analysis. Fish detects a 5q deletion in 54.5% of nuclei. Cytogenetics reported 20 metaphases, 2 metaphases were normal and 18 metaphases had a 5q deletion. No additional cytogenetic abnormalities were detected. Findings consistent with 5q deletion syndrome.     Patient had a delay in obtaining Revlimid 10 mg daily but did start taking the medication 2/8/17. On 2/9/17 patient developed a diffuse maculopapular rash throughout scalp arms, legs and torso. She states she had no stridor or wheezing. She discontinued medication 2/15/17. Went to allergist who provided references on desensitization so that patient could resume Revlimid. Unfortunately developed pancytopenia and Revlimid stopped 6/16/17. She had a repeat BMBX  performed 6/8/17 and showed a hypercellular marrow (60%) continued atypia in the granulocytes and megakaryocytes were noted.  Additionally, there is erythroid atypia present. No increase in blasts. Cytogenetics are normal and MDS FISH is negative, failing to show 5 q minus. NGS should no significant molecular mutations.  Anemia work up revealed bienvenido negative hemolysis.      Review of Systems   Constitutional: Positive for fatigue. Negative for fever.   HENT: Negative for sinus congestion or rhinorrhea. Negative for ear pain, mouth sores, nosebleeds and trouble swallowing.    Eyes:      Respiratory: Negative for cough, shortness of breath and wheezing.    Cardiovascular: Negative for chest pain and leg swelling.   Gastrointestinal: Negative for abdominal distention, abdominal pain, blood in stool, constipation, diarrhea, nausea and vomiting.        Improved with Imodium   Endocrine: Negative for polyphagia and polyuria.   Genitourinary: Negative for dysuria, hematuria and urgency.   Musculoskeletal: Positive for arthralgias. Negative for myalgias.   Skin: Negative for color change, pallor and rash.   Neurological: Positive for numbness (to balls of feet; likely secondary to DM2). Negative for dizziness,  weakness, light-headedness and headaches.   Hematological: Negative for adenopathy. Does not bruise/bleed easily.   Psychiatric/Behavioral: Negative for agitation and behavioral problems.       Objective:      Physical Exam   Constitutional: She is oriented to person, place, and time. She appears well-developed and well-nourished.   HENT:   Head: Normocephalic and atraumatic.   Right Ear: External ear normal.   Left Ear: External ear normal.   Mouth/Throat: Oropharynx is clear and moist. No oropharyngeal exudate.   Eyes: Conjunctivae and EOM are normal. Pupils are equal, round, and reactive to light. Right eye exhibits no discharge. Left eye exhibits no discharge.   Neck: Normal range of motion. Neck supple.   Cardiovascular: Normal rate, regular rhythm, normal heart sounds and intact distal pulses.   No murmur heard.  Pulmonary/Chest: Effort normal and breath sounds normal. No respiratory distress. She has no wheezes.   Abdominal: Soft. Bowel sounds are normal. She exhibits no distension and no mass. There is no tenderness.   Musculoskeletal: Normal range of motion. She exhibits no edema.   Lymphadenopathy:     She has no cervical adenopathy.   Neurological: She is alert and oriented to person, place, and time.   Skin: Skin is warm and dry. No rash noted.   Psychiatric: She has a normal mood and affect. Her behavior is normal. Judgment and thought content normal.   Nursing note and vitals reviewed.      Assessment:       1. MDS (myelodysplastic syndrome) with 5q deletion        Plan:     MDS/Pancytopenia  Initially with 5 q minus syndrome and treated with Revlimid. Developed allergy and was then desensitized. Soon after developed pancytopenia and Revlimid held since June 2017.  BMBX,6/8/17, had normal cytogenetics. Repeat marrow from 6/7/18 shows relapsed 5q minus. Discussed treatment options of HMA therapy or repeat trial of Revlimid and patient wished to proceed with Revlimid   Revlimid stopped  Hold all at this  time to allow for CBC recovery  Plan to start Vidaza 5/6/19 subq for 5 days every 28 days; consent completed today    Stage 3 CKD  - stable; continue to monitor with visits    DM2  - management per PCP; BG WNL today  - continue metformin; educated to perform daily foot checks with neuropathy to feet    HTN  - management per PCP  - continue treatment with lisinopril-HCTZ    CAD  - attempting to get praluent for HLD per PCP; intolerant to statins; on ASA    Anxiety/Depression  She reports improved mood now on Celexa. Continue.     F/U  CBC, type and screen, and first cycle of vidaza 5/6/-5/10/19  CBC and type and screen weekly  Return visit with me or NP Thais 3 for cycle 2 vidaza 6/3-6/7 with CBC, type and screen, and CMP

## 2019-05-02 NOTE — Clinical Note
CBC, type and screen, and first cycle of vidaza 5/6/-5/10/19CBC and type and screen weeklyReturn visit with me or NP Thais 3 for cycle 2 vidaza 6/3-6/7 with CBC, type and screen, and CMP

## 2019-05-04 DIAGNOSIS — K21.9 GASTROESOPHAGEAL REFLUX DISEASE WITHOUT ESOPHAGITIS: ICD-10-CM

## 2019-05-06 RX ORDER — PANTOPRAZOLE SODIUM 40 MG/1
TABLET, DELAYED RELEASE ORAL
Qty: 30 TABLET | Refills: 1 | OUTPATIENT
Start: 2019-05-06

## 2019-05-14 ENCOUNTER — PATIENT MESSAGE (OUTPATIENT)
Dept: HEMATOLOGY/ONCOLOGY | Facility: CLINIC | Age: 69
End: 2019-05-14

## 2019-05-16 DIAGNOSIS — K21.9 GASTROESOPHAGEAL REFLUX DISEASE WITHOUT ESOPHAGITIS: ICD-10-CM

## 2019-05-16 NOTE — TELEPHONE ENCOUNTER
Spoke to patient.  Reviewed appts.Verbalized understanding  ----- Message from Barbara Chadwick sent at 5/16/2019 12:41 PM CDT -----  Contact: pt   Pt called to speak with Nurse Ludwig have some questions   Callback#[phone]  Thank You  ROD Chadwick

## 2019-05-17 ENCOUNTER — PATIENT MESSAGE (OUTPATIENT)
Dept: HEMATOLOGY/ONCOLOGY | Facility: CLINIC | Age: 69
End: 2019-05-17

## 2019-05-17 RX ORDER — PANTOPRAZOLE SODIUM 40 MG/1
40 TABLET, DELAYED RELEASE ORAL DAILY
Qty: 30 TABLET | Refills: 1 | Status: SHIPPED | OUTPATIENT
Start: 2019-05-17 | End: 2019-08-02 | Stop reason: SDUPTHER

## 2019-05-20 ENCOUNTER — INFUSION (OUTPATIENT)
Dept: INFUSION THERAPY | Facility: HOSPITAL | Age: 69
End: 2019-05-20
Attending: INTERNAL MEDICINE
Payer: MEDICARE

## 2019-05-20 ENCOUNTER — TELEPHONE (OUTPATIENT)
Dept: HEMATOLOGY/ONCOLOGY | Facility: CLINIC | Age: 69
End: 2019-05-20

## 2019-05-20 VITALS
DIASTOLIC BLOOD PRESSURE: 51 MMHG | RESPIRATION RATE: 18 BRPM | WEIGHT: 194 LBS | HEIGHT: 65 IN | SYSTOLIC BLOOD PRESSURE: 114 MMHG | HEART RATE: 93 BPM | BODY MASS INDEX: 32.32 KG/M2

## 2019-05-20 DIAGNOSIS — D46.C MDS (MYELODYSPLASTIC SYNDROME) WITH 5Q DELETION: Primary | ICD-10-CM

## 2019-05-20 DIAGNOSIS — D63.0 ANEMIA IN NEOPLASTIC DISEASE: ICD-10-CM

## 2019-05-20 PROCEDURE — 63600175 PHARM REV CODE 636 W HCPCS: Mod: HCNC,JG | Performed by: INTERNAL MEDICINE

## 2019-05-20 PROCEDURE — 96401 CHEMO ANTI-NEOPL SQ/IM: CPT | Mod: HCNC

## 2019-05-20 PROCEDURE — 86920 COMPATIBILITY TEST SPIN: CPT | Mod: HCNC,59

## 2019-05-20 PROCEDURE — 27201040 HC RC 50 FILTER: Mod: HCNC

## 2019-05-20 RX ORDER — AZACITIDINE 100 MG/1
75 INJECTION, POWDER, LYOPHILIZED, FOR SOLUTION INTRAVENOUS; SUBCUTANEOUS
Status: CANCELLED | OUTPATIENT
Start: 2019-05-21

## 2019-05-20 RX ORDER — DIPHENHYDRAMINE HCL 25 MG
25 CAPSULE ORAL
Status: CANCELLED | OUTPATIENT
Start: 2019-05-20

## 2019-05-20 RX ORDER — AZACITIDINE 100 MG/1
75 INJECTION, POWDER, LYOPHILIZED, FOR SOLUTION INTRAVENOUS; SUBCUTANEOUS
Status: CANCELLED | OUTPATIENT
Start: 2019-05-22

## 2019-05-20 RX ORDER — AZACITIDINE 100 MG/1
75 INJECTION, POWDER, LYOPHILIZED, FOR SOLUTION INTRAVENOUS; SUBCUTANEOUS
Status: CANCELLED | OUTPATIENT
Start: 2019-05-24

## 2019-05-20 RX ORDER — AZACITIDINE 100 MG/1
75 INJECTION, POWDER, LYOPHILIZED, FOR SOLUTION INTRAVENOUS; SUBCUTANEOUS
Status: COMPLETED | OUTPATIENT
Start: 2019-05-20 | End: 2019-05-20

## 2019-05-20 RX ORDER — ACETAMINOPHEN 325 MG/1
650 TABLET ORAL
Status: CANCELLED | OUTPATIENT
Start: 2019-05-20

## 2019-05-20 RX ORDER — AZACITIDINE 100 MG/1
75 INJECTION, POWDER, LYOPHILIZED, FOR SOLUTION INTRAVENOUS; SUBCUTANEOUS
Status: CANCELLED | OUTPATIENT
Start: 2019-05-23

## 2019-05-20 RX ORDER — HYDROCODONE BITARTRATE AND ACETAMINOPHEN 500; 5 MG/1; MG/1
TABLET ORAL ONCE
Status: CANCELLED | OUTPATIENT
Start: 2019-05-20 | End: 2019-05-20

## 2019-05-20 RX ADMIN — AZACITIDINE 150 MG: 100 INJECTION, POWDER, LYOPHILIZED, FOR SOLUTION INTRAVENOUS; SUBCUTANEOUS at 04:05

## 2019-05-20 NOTE — NURSING
1630- Patient arrived ambulatory to the unit after having lab draw.  Lab results reviewed and Md contacted to sign treatment orders.  Patient receive cycle 1 day 1 of vidaza injections, was given written and verbal medication information and AVS on discharge.  Patient to return tomorrow for blood transfusion and next set of injections.

## 2019-05-21 ENCOUNTER — INFUSION (OUTPATIENT)
Dept: INFUSION THERAPY | Facility: HOSPITAL | Age: 69
End: 2019-05-21
Attending: INTERNAL MEDICINE
Payer: MEDICARE

## 2019-05-21 ENCOUNTER — TELEPHONE (OUTPATIENT)
Dept: HEMATOLOGY/ONCOLOGY | Facility: CLINIC | Age: 69
End: 2019-05-21

## 2019-05-21 VITALS
DIASTOLIC BLOOD PRESSURE: 68 MMHG | OXYGEN SATURATION: 95 % | SYSTOLIC BLOOD PRESSURE: 118 MMHG | RESPIRATION RATE: 18 BRPM | TEMPERATURE: 98 F | HEART RATE: 88 BPM

## 2019-05-21 DIAGNOSIS — D63.0 ANEMIA IN NEOPLASTIC DISEASE: ICD-10-CM

## 2019-05-21 DIAGNOSIS — D46.C MDS (MYELODYSPLASTIC SYNDROME) WITH 5Q DELETION: Primary | ICD-10-CM

## 2019-05-21 PROCEDURE — 25000003 PHARM REV CODE 250: Mod: HCNC | Performed by: NURSE PRACTITIONER

## 2019-05-21 PROCEDURE — 96401 CHEMO ANTI-NEOPL SQ/IM: CPT | Mod: HCNC

## 2019-05-21 PROCEDURE — 63600175 PHARM REV CODE 636 W HCPCS: Mod: HCNC,JG | Performed by: INTERNAL MEDICINE

## 2019-05-21 PROCEDURE — P9038 RBC IRRADIATED: HCPCS | Mod: HCNC

## 2019-05-21 PROCEDURE — 36430 TRANSFUSION BLD/BLD COMPNT: CPT | Mod: HCNC

## 2019-05-21 RX ORDER — DIPHENHYDRAMINE HCL 25 MG
25 CAPSULE ORAL
Status: COMPLETED | OUTPATIENT
Start: 2019-05-21 | End: 2019-05-21

## 2019-05-21 RX ORDER — AZACITIDINE 100 MG/1
75 INJECTION, POWDER, LYOPHILIZED, FOR SOLUTION INTRAVENOUS; SUBCUTANEOUS
Status: COMPLETED | OUTPATIENT
Start: 2019-05-21 | End: 2019-05-21

## 2019-05-21 RX ORDER — ACETAMINOPHEN 325 MG/1
650 TABLET ORAL
Status: COMPLETED | OUTPATIENT
Start: 2019-05-21 | End: 2019-05-21

## 2019-05-21 RX ORDER — HYDROCODONE BITARTRATE AND ACETAMINOPHEN 500; 5 MG/1; MG/1
TABLET ORAL ONCE
Status: COMPLETED | OUTPATIENT
Start: 2019-05-21 | End: 2019-05-21

## 2019-05-21 RX ADMIN — SODIUM CHLORIDE: 0.9 INJECTION, SOLUTION INTRAVENOUS at 02:05

## 2019-05-21 RX ADMIN — AZACITIDINE 150 MG: 100 INJECTION, POWDER, LYOPHILIZED, FOR SOLUTION INTRAVENOUS; SUBCUTANEOUS at 05:05

## 2019-05-21 RX ADMIN — DIPHENHYDRAMINE HYDROCHLORIDE 25 MG: 25 CAPSULE ORAL at 01:05

## 2019-05-21 RX ADMIN — ACETAMINOPHEN 650 MG: 325 TABLET ORAL at 01:05

## 2019-05-21 NOTE — PLAN OF CARE
Problem: Adult Inpatient Plan of Care  Goal: Plan of Care Review  Outcome: Ongoing (interventions implemented as appropriate)  Pt admitted for 2 units PRBC and #2 Vidaza injections Side effects and self-care tips while on Vidaza discussed. Pt tolerated treatment well. Plan of care reviewed and Pt instructed to contact MD with any further concerns or questions.Pt discharged @ 17:45

## 2019-05-22 ENCOUNTER — PATIENT MESSAGE (OUTPATIENT)
Dept: HEMATOLOGY/ONCOLOGY | Facility: CLINIC | Age: 69
End: 2019-05-22

## 2019-05-22 ENCOUNTER — INFUSION (OUTPATIENT)
Dept: INFUSION THERAPY | Facility: HOSPITAL | Age: 69
End: 2019-05-22
Attending: INTERNAL MEDICINE
Payer: MEDICARE

## 2019-05-22 VITALS — HEART RATE: 93 BPM | SYSTOLIC BLOOD PRESSURE: 131 MMHG | DIASTOLIC BLOOD PRESSURE: 60 MMHG

## 2019-05-22 DIAGNOSIS — D46.C MDS (MYELODYSPLASTIC SYNDROME) WITH 5Q DELETION: Primary | ICD-10-CM

## 2019-05-22 PROCEDURE — 63600175 PHARM REV CODE 636 W HCPCS: Mod: HCNC,JG | Performed by: INTERNAL MEDICINE

## 2019-05-22 PROCEDURE — 96401 CHEMO ANTI-NEOPL SQ/IM: CPT | Mod: HCNC

## 2019-05-22 RX ORDER — AZACITIDINE 100 MG/1
75 INJECTION, POWDER, LYOPHILIZED, FOR SOLUTION INTRAVENOUS; SUBCUTANEOUS
Status: COMPLETED | OUTPATIENT
Start: 2019-05-22 | End: 2019-05-22

## 2019-05-22 RX ADMIN — AZACITIDINE 150 MG: 100 INJECTION, POWDER, LYOPHILIZED, FOR SOLUTION INTRAVENOUS; SUBCUTANEOUS at 02:05

## 2019-05-22 NOTE — NURSING
Patient here for vidaza injections-given in 3 divided doses-states feeling much better today-no SOB or nausea-tolerated injections well.

## 2019-05-23 ENCOUNTER — INFUSION (OUTPATIENT)
Dept: INFUSION THERAPY | Facility: HOSPITAL | Age: 69
End: 2019-05-23
Attending: INTERNAL MEDICINE
Payer: MEDICARE

## 2019-05-23 VITALS — HEART RATE: 93 BPM | SYSTOLIC BLOOD PRESSURE: 106 MMHG | RESPIRATION RATE: 18 BRPM | DIASTOLIC BLOOD PRESSURE: 50 MMHG

## 2019-05-23 DIAGNOSIS — D46.C MDS (MYELODYSPLASTIC SYNDROME) WITH 5Q DELETION: Primary | ICD-10-CM

## 2019-05-23 PROCEDURE — 63600175 PHARM REV CODE 636 W HCPCS: Mod: HCNC,JG | Performed by: INTERNAL MEDICINE

## 2019-05-23 PROCEDURE — 96401 CHEMO ANTI-NEOPL SQ/IM: CPT | Mod: HCNC

## 2019-05-23 RX ORDER — AZACITIDINE 100 MG/1
75 INJECTION, POWDER, LYOPHILIZED, FOR SOLUTION INTRAVENOUS; SUBCUTANEOUS
Status: COMPLETED | OUTPATIENT
Start: 2019-05-23 | End: 2019-05-23

## 2019-05-23 RX ADMIN — AZACITIDINE 150 MG: 100 INJECTION, POWDER, LYOPHILIZED, FOR SOLUTION INTRAVENOUS; SUBCUTANEOUS at 02:05

## 2019-05-24 ENCOUNTER — INFUSION (OUTPATIENT)
Dept: INFUSION THERAPY | Facility: HOSPITAL | Age: 69
End: 2019-05-24
Attending: INTERNAL MEDICINE
Payer: MEDICARE

## 2019-05-24 VITALS — BODY MASS INDEX: 32.29 KG/M2 | HEIGHT: 65 IN | WEIGHT: 193.81 LBS

## 2019-05-24 DIAGNOSIS — D46.C MDS (MYELODYSPLASTIC SYNDROME) WITH 5Q DELETION: Primary | ICD-10-CM

## 2019-05-24 PROCEDURE — 96401 CHEMO ANTI-NEOPL SQ/IM: CPT | Mod: HCNC

## 2019-05-24 PROCEDURE — 63600175 PHARM REV CODE 636 W HCPCS: Mod: JW,HCNC,JG | Performed by: INTERNAL MEDICINE

## 2019-05-24 RX ORDER — AZACITIDINE 100 MG/1
75 INJECTION, POWDER, LYOPHILIZED, FOR SOLUTION INTRAVENOUS; SUBCUTANEOUS
Status: COMPLETED | OUTPATIENT
Start: 2019-05-24 | End: 2019-05-24

## 2019-05-24 RX ADMIN — AZACITIDINE 150 MG: 100 INJECTION, POWDER, LYOPHILIZED, FOR SOLUTION INTRAVENOUS; SUBCUTANEOUS at 02:05

## 2019-06-05 ENCOUNTER — TELEPHONE (OUTPATIENT)
Dept: HEMATOLOGY/ONCOLOGY | Facility: CLINIC | Age: 69
End: 2019-06-05

## 2019-06-05 ENCOUNTER — PATIENT MESSAGE (OUTPATIENT)
Dept: HEMATOLOGY/ONCOLOGY | Facility: CLINIC | Age: 69
End: 2019-06-05

## 2019-06-05 ENCOUNTER — LAB VISIT (OUTPATIENT)
Dept: LAB | Facility: HOSPITAL | Age: 69
End: 2019-06-05
Attending: INTERNAL MEDICINE
Payer: MEDICARE

## 2019-06-05 DIAGNOSIS — D46.9 MDS (MYELODYSPLASTIC SYNDROME): ICD-10-CM

## 2019-06-05 DIAGNOSIS — D63.0 ANEMIA IN NEOPLASTIC DISEASE: ICD-10-CM

## 2019-06-05 DIAGNOSIS — D46.C MDS (MYELODYSPLASTIC SYNDROME) WITH 5Q DELETION: Primary | ICD-10-CM

## 2019-06-05 DIAGNOSIS — D46.C MDS (MYELODYSPLASTIC SYNDROME) WITH 5Q DELETION: ICD-10-CM

## 2019-06-05 LAB
ABO + RH BLD: NORMAL
ALBUMIN SERPL BCP-MCNC: 3.8 G/DL (ref 3.5–5.2)
ALP SERPL-CCNC: 76 U/L (ref 55–135)
ALT SERPL W/O P-5'-P-CCNC: 47 U/L (ref 10–44)
ANION GAP SERPL CALC-SCNC: 10 MMOL/L (ref 8–16)
ANISOCYTOSIS BLD QL SMEAR: SLIGHT
AST SERPL-CCNC: 22 U/L (ref 10–40)
BASOPHILS # BLD AUTO: 0.01 K/UL (ref 0–0.2)
BASOPHILS NFR BLD: 0.3 % (ref 0–1.9)
BILIRUB SERPL-MCNC: 0.3 MG/DL (ref 0.1–1)
BLD GP AB SCN CELLS X3 SERPL QL: NORMAL
BUN SERPL-MCNC: 35 MG/DL (ref 8–23)
CALCIUM SERPL-MCNC: 9.9 MG/DL (ref 8.7–10.5)
CHLORIDE SERPL-SCNC: 109 MMOL/L (ref 95–110)
CO2 SERPL-SCNC: 24 MMOL/L (ref 23–29)
CREAT SERPL-MCNC: 1.2 MG/DL (ref 0.5–1.4)
DIFFERENTIAL METHOD: ABNORMAL
EOSINOPHIL # BLD AUTO: 0.2 K/UL (ref 0–0.5)
EOSINOPHIL NFR BLD: 4.8 % (ref 0–8)
ERYTHROCYTE [DISTWIDTH] IN BLOOD BY AUTOMATED COUNT: 22.9 % (ref 11.5–14.5)
EST. GFR  (AFRICAN AMERICAN): 53.7 ML/MIN/1.73 M^2
EST. GFR  (NON AFRICAN AMERICAN): 46.5 ML/MIN/1.73 M^2
GLUCOSE SERPL-MCNC: 99 MG/DL (ref 70–110)
HCT VFR BLD AUTO: 18.3 % (ref 37–48.5)
HGB BLD-MCNC: 6 G/DL (ref 12–16)
HYPOCHROMIA BLD QL SMEAR: ABNORMAL
IMM GRANULOCYTES # BLD AUTO: 0.11 K/UL (ref 0–0.04)
IMM GRANULOCYTES NFR BLD AUTO: 3.5 % (ref 0–0.5)
LYMPHOCYTES # BLD AUTO: 1.1 K/UL (ref 1–4.8)
LYMPHOCYTES NFR BLD: 35 % (ref 18–48)
MCH RBC QN AUTO: 32.4 PG (ref 27–31)
MCHC RBC AUTO-ENTMCNC: 32.8 G/DL (ref 32–36)
MCV RBC AUTO: 99 FL (ref 82–98)
MONOCYTES # BLD AUTO: 0.1 K/UL (ref 0.3–1)
MONOCYTES NFR BLD: 2.6 % (ref 4–15)
NEUTROPHILS # BLD AUTO: 1.7 K/UL (ref 1.8–7.7)
NEUTROPHILS NFR BLD: 53.8 % (ref 38–73)
NRBC BLD-RTO: 0 /100 WBC
PLATELET # BLD AUTO: 82 K/UL (ref 150–350)
PLATELET BLD QL SMEAR: ABNORMAL
PMV BLD AUTO: 11.5 FL (ref 9.2–12.9)
POTASSIUM SERPL-SCNC: 4.7 MMOL/L (ref 3.5–5.1)
PROT SERPL-MCNC: 6.9 G/DL (ref 6–8.4)
RBC # BLD AUTO: 1.85 M/UL (ref 4–5.4)
SODIUM SERPL-SCNC: 143 MMOL/L (ref 136–145)
WBC # BLD AUTO: 3.11 K/UL (ref 3.9–12.7)

## 2019-06-05 PROCEDURE — 80053 COMPREHEN METABOLIC PANEL: CPT | Mod: HCNC

## 2019-06-05 PROCEDURE — 86850 RBC ANTIBODY SCREEN: CPT | Mod: HCNC

## 2019-06-05 PROCEDURE — 36415 COLL VENOUS BLD VENIPUNCTURE: CPT | Mod: HCNC

## 2019-06-05 PROCEDURE — 85025 COMPLETE CBC W/AUTO DIFF WBC: CPT | Mod: HCNC

## 2019-06-05 RX ORDER — HYDROCODONE BITARTRATE AND ACETAMINOPHEN 500; 5 MG/1; MG/1
TABLET ORAL ONCE
Status: CANCELLED | OUTPATIENT
Start: 2019-06-05 | End: 2019-06-05

## 2019-06-05 RX ORDER — ACETAMINOPHEN 325 MG/1
650 TABLET ORAL
Status: CANCELLED | OUTPATIENT
Start: 2019-06-05

## 2019-06-05 RX ORDER — DIPHENHYDRAMINE HCL 25 MG
25 CAPSULE ORAL
Status: CANCELLED | OUTPATIENT
Start: 2019-06-05

## 2019-06-06 ENCOUNTER — INFUSION (OUTPATIENT)
Dept: INFUSION THERAPY | Facility: HOSPITAL | Age: 69
End: 2019-06-06
Attending: INTERNAL MEDICINE
Payer: MEDICARE

## 2019-06-06 ENCOUNTER — PATIENT MESSAGE (OUTPATIENT)
Dept: HEMATOLOGY/ONCOLOGY | Facility: CLINIC | Age: 69
End: 2019-06-06

## 2019-06-06 VITALS
DIASTOLIC BLOOD PRESSURE: 61 MMHG | TEMPERATURE: 98 F | SYSTOLIC BLOOD PRESSURE: 128 MMHG | RESPIRATION RATE: 16 BRPM | HEART RATE: 80 BPM

## 2019-06-06 DIAGNOSIS — D46.C MDS (MYELODYSPLASTIC SYNDROME) WITH 5Q DELETION: ICD-10-CM

## 2019-06-06 DIAGNOSIS — D63.0 ANEMIA IN NEOPLASTIC DISEASE: ICD-10-CM

## 2019-06-06 PROCEDURE — 36430 TRANSFUSION BLD/BLD COMPNT: CPT | Mod: HCNC

## 2019-06-06 PROCEDURE — 25000003 PHARM REV CODE 250: Mod: HCNC | Performed by: NURSE PRACTITIONER

## 2019-06-06 RX ORDER — HYDROCODONE BITARTRATE AND ACETAMINOPHEN 500; 5 MG/1; MG/1
TABLET ORAL ONCE
Status: COMPLETED | OUTPATIENT
Start: 2019-06-06 | End: 2019-06-06

## 2019-06-06 RX ORDER — ACETAMINOPHEN 325 MG/1
650 TABLET ORAL
Status: COMPLETED | OUTPATIENT
Start: 2019-06-06 | End: 2019-06-06

## 2019-06-06 RX ORDER — DIPHENHYDRAMINE HCL 25 MG
25 CAPSULE ORAL
Status: COMPLETED | OUTPATIENT
Start: 2019-06-06 | End: 2019-06-06

## 2019-06-06 RX ADMIN — DIPHENHYDRAMINE HYDROCHLORIDE 25 MG: 25 CAPSULE ORAL at 02:06

## 2019-06-06 RX ADMIN — ACETAMINOPHEN 650 MG: 325 TABLET ORAL at 02:06

## 2019-06-06 RX ADMIN — SODIUM CHLORIDE: 0.9 INJECTION, SOLUTION INTRAVENOUS at 02:06

## 2019-06-06 NOTE — PLAN OF CARE
Problem: Adult Inpatient Plan of Care  Goal: Plan of Care Review  Outcome: Ongoing (interventions implemented as appropriate)  Pt tolerated 1u PRBCs with no complications. VSS. Pt instructed to call MD with any problems. NAD. Pt discharged home independently.

## 2019-06-12 ENCOUNTER — PATIENT MESSAGE (OUTPATIENT)
Dept: HEMATOLOGY/ONCOLOGY | Facility: CLINIC | Age: 69
End: 2019-06-12

## 2019-06-13 ENCOUNTER — PATIENT MESSAGE (OUTPATIENT)
Dept: HEMATOLOGY/ONCOLOGY | Facility: CLINIC | Age: 69
End: 2019-06-13

## 2019-06-13 ENCOUNTER — INFUSION (OUTPATIENT)
Dept: INFUSION THERAPY | Facility: HOSPITAL | Age: 69
End: 2019-06-13
Attending: INTERNAL MEDICINE
Payer: MEDICARE

## 2019-06-13 VITALS
RESPIRATION RATE: 18 BRPM | SYSTOLIC BLOOD PRESSURE: 102 MMHG | HEART RATE: 84 BPM | DIASTOLIC BLOOD PRESSURE: 49 MMHG | TEMPERATURE: 98 F

## 2019-06-13 DIAGNOSIS — D46.C MDS (MYELODYSPLASTIC SYNDROME) WITH 5Q DELETION: Primary | ICD-10-CM

## 2019-06-13 DIAGNOSIS — D46.C MDS (MYELODYSPLASTIC SYNDROME) WITH 5Q DELETION: ICD-10-CM

## 2019-06-13 DIAGNOSIS — D63.0 ANEMIA IN NEOPLASTIC DISEASE: ICD-10-CM

## 2019-06-13 PROCEDURE — 36430 TRANSFUSION BLD/BLD COMPNT: CPT | Mod: HCNC

## 2019-06-13 PROCEDURE — 25000003 PHARM REV CODE 250: Mod: HCNC | Performed by: NURSE PRACTITIONER

## 2019-06-13 PROCEDURE — 86920 COMPATIBILITY TEST SPIN: CPT | Mod: HCNC

## 2019-06-13 PROCEDURE — P9040 RBC LEUKOREDUCED IRRADIATED: HCPCS | Mod: HCNC

## 2019-06-13 RX ORDER — HYDROCODONE BITARTRATE AND ACETAMINOPHEN 500; 5 MG/1; MG/1
TABLET ORAL ONCE
Status: COMPLETED | OUTPATIENT
Start: 2019-06-13 | End: 2019-06-13

## 2019-06-13 RX ORDER — HYDROCODONE BITARTRATE AND ACETAMINOPHEN 500; 5 MG/1; MG/1
TABLET ORAL ONCE
Status: CANCELLED | OUTPATIENT
Start: 2019-06-13 | End: 2019-06-13

## 2019-06-13 RX ORDER — ACETAMINOPHEN 325 MG/1
650 TABLET ORAL
Status: COMPLETED | OUTPATIENT
Start: 2019-06-13 | End: 2019-06-13

## 2019-06-13 RX ORDER — DIPHENHYDRAMINE HCL 25 MG
25 CAPSULE ORAL
Status: COMPLETED | OUTPATIENT
Start: 2019-06-13 | End: 2019-06-13

## 2019-06-13 RX ORDER — ACETAMINOPHEN 325 MG/1
650 TABLET ORAL
Status: CANCELLED | OUTPATIENT
Start: 2019-06-13

## 2019-06-13 RX ORDER — DIPHENHYDRAMINE HCL 25 MG
25 CAPSULE ORAL
Status: CANCELLED | OUTPATIENT
Start: 2019-06-13

## 2019-06-13 RX ADMIN — SODIUM CHLORIDE: 0.9 INJECTION, SOLUTION INTRAVENOUS at 02:06

## 2019-06-13 RX ADMIN — DIPHENHYDRAMINE HYDROCHLORIDE 25 MG: 25 CAPSULE ORAL at 02:06

## 2019-06-13 RX ADMIN — ACETAMINOPHEN 650 MG: 325 TABLET ORAL at 02:06

## 2019-06-13 NOTE — PLAN OF CARE
Problem: Adult Inpatient Plan of Care  Goal: Plan of Care Review  6473-Patient tolerated treatment well. Discharged without complaints or S/S of adverse event. AVS given.  Instructed to call provider for any questions or concerns.

## 2019-06-13 NOTE — PLAN OF CARE
Problem: Adult Inpatient Plan of Care  Goal: Patient-Specific Goal (Individualization)  Outcome: Ongoing (interventions implemented as appropriate)  1414-Labs , hx, and medications reviewed, patient is here for one unit of blood. Assessment completed. Discussed plan of care with patient. Patient in agreement. Chair reclined and warm blanket and snack offered.

## 2019-06-17 ENCOUNTER — INFUSION (OUTPATIENT)
Dept: INFUSION THERAPY | Facility: HOSPITAL | Age: 69
End: 2019-06-17
Attending: INTERNAL MEDICINE
Payer: MEDICARE

## 2019-06-17 ENCOUNTER — OFFICE VISIT (OUTPATIENT)
Dept: HEMATOLOGY/ONCOLOGY | Facility: CLINIC | Age: 69
End: 2019-06-17
Payer: MEDICARE

## 2019-06-17 ENCOUNTER — TELEPHONE (OUTPATIENT)
Dept: HEMATOLOGY/ONCOLOGY | Facility: CLINIC | Age: 69
End: 2019-06-17

## 2019-06-17 VITALS
HEIGHT: 65 IN | WEIGHT: 192.44 LBS | OXYGEN SATURATION: 96 % | HEART RATE: 102 BPM | DIASTOLIC BLOOD PRESSURE: 57 MMHG | BODY MASS INDEX: 32.06 KG/M2 | RESPIRATION RATE: 20 BRPM | TEMPERATURE: 98 F | SYSTOLIC BLOOD PRESSURE: 119 MMHG

## 2019-06-17 VITALS
WEIGHT: 192 LBS | BODY MASS INDEX: 31.99 KG/M2 | RESPIRATION RATE: 18 BRPM | SYSTOLIC BLOOD PRESSURE: 110 MMHG | DIASTOLIC BLOOD PRESSURE: 60 MMHG | TEMPERATURE: 98 F | HEIGHT: 65 IN | HEART RATE: 90 BPM

## 2019-06-17 DIAGNOSIS — D70.1 CHEMOTHERAPY INDUCED NEUTROPENIA: ICD-10-CM

## 2019-06-17 DIAGNOSIS — D63.0 ANEMIA IN NEOPLASTIC DISEASE: ICD-10-CM

## 2019-06-17 DIAGNOSIS — D46.C MDS (MYELODYSPLASTIC SYNDROME) WITH 5Q DELETION: Primary | ICD-10-CM

## 2019-06-17 DIAGNOSIS — T45.1X5A CHEMOTHERAPY INDUCED NEUTROPENIA: ICD-10-CM

## 2019-06-17 PROCEDURE — 96367 TX/PROPH/DG ADDL SEQ IV INF: CPT | Mod: HCNC

## 2019-06-17 PROCEDURE — 27201040 HC RC 50 FILTER: Mod: HCNC

## 2019-06-17 PROCEDURE — P9038 RBC IRRADIATED: HCPCS | Mod: HCNC

## 2019-06-17 PROCEDURE — 3074F SYST BP LT 130 MM HG: CPT | Mod: HCNC,CPTII,S$GLB, | Performed by: INTERNAL MEDICINE

## 2019-06-17 PROCEDURE — 3078F DIAST BP <80 MM HG: CPT | Mod: HCNC,CPTII,S$GLB, | Performed by: INTERNAL MEDICINE

## 2019-06-17 PROCEDURE — 99999 PR PBB SHADOW E&M-EST. PATIENT-LVL III: CPT | Mod: PBBFAC,HCNC,, | Performed by: INTERNAL MEDICINE

## 2019-06-17 PROCEDURE — 1101F PT FALLS ASSESS-DOCD LE1/YR: CPT | Mod: HCNC,CPTII,S$GLB, | Performed by: INTERNAL MEDICINE

## 2019-06-17 PROCEDURE — 99215 PR OFFICE/OUTPT VISIT, EST, LEVL V, 40-54 MIN: ICD-10-PCS | Mod: HCNC,S$GLB,, | Performed by: INTERNAL MEDICINE

## 2019-06-17 PROCEDURE — 1101F PR PT FALLS ASSESS DOC 0-1 FALLS W/OUT INJ PAST YR: ICD-10-PCS | Mod: HCNC,CPTII,S$GLB, | Performed by: INTERNAL MEDICINE

## 2019-06-17 PROCEDURE — 86920 COMPATIBILITY TEST SPIN: CPT | Mod: HCNC

## 2019-06-17 PROCEDURE — 3074F PR MOST RECENT SYSTOLIC BLOOD PRESSURE < 130 MM HG: ICD-10-PCS | Mod: HCNC,CPTII,S$GLB, | Performed by: INTERNAL MEDICINE

## 2019-06-17 PROCEDURE — 99215 OFFICE O/P EST HI 40 MIN: CPT | Mod: HCNC,S$GLB,, | Performed by: INTERNAL MEDICINE

## 2019-06-17 PROCEDURE — 36430 TRANSFUSION BLD/BLD COMPNT: CPT | Mod: HCNC

## 2019-06-17 PROCEDURE — 99999 PR PBB SHADOW E&M-EST. PATIENT-LVL III: ICD-10-PCS | Mod: PBBFAC,HCNC,, | Performed by: INTERNAL MEDICINE

## 2019-06-17 PROCEDURE — 96401 CHEMO ANTI-NEOPL SQ/IM: CPT | Mod: HCNC

## 2019-06-17 PROCEDURE — 63600175 PHARM REV CODE 636 W HCPCS: Mod: HCNC,JG | Performed by: INTERNAL MEDICINE

## 2019-06-17 PROCEDURE — 3078F PR MOST RECENT DIASTOLIC BLOOD PRESSURE < 80 MM HG: ICD-10-PCS | Mod: HCNC,CPTII,S$GLB, | Performed by: INTERNAL MEDICINE

## 2019-06-17 RX ORDER — ONDANSETRON 2 MG/ML
8 INJECTION INTRAMUSCULAR; INTRAVENOUS
Status: CANCELLED
Start: 2019-06-19

## 2019-06-17 RX ORDER — ONDANSETRON 2 MG/ML
8 INJECTION INTRAMUSCULAR; INTRAVENOUS
Status: CANCELLED
Start: 2019-06-20

## 2019-06-17 RX ORDER — ONDANSETRON 2 MG/ML
8 INJECTION INTRAMUSCULAR; INTRAVENOUS
Status: CANCELLED
Start: 2019-06-17

## 2019-06-17 RX ORDER — DIPHENHYDRAMINE HCL 25 MG
25 CAPSULE ORAL
Status: COMPLETED | OUTPATIENT
Start: 2019-06-17 | End: 2019-06-17

## 2019-06-17 RX ORDER — AZACITIDINE 100 MG/1
75 INJECTION, POWDER, LYOPHILIZED, FOR SOLUTION INTRAVENOUS; SUBCUTANEOUS
Status: CANCELLED | OUTPATIENT
Start: 2019-06-21

## 2019-06-17 RX ORDER — AZACITIDINE 100 MG/1
75 INJECTION, POWDER, LYOPHILIZED, FOR SOLUTION INTRAVENOUS; SUBCUTANEOUS
Status: CANCELLED | OUTPATIENT
Start: 2019-06-17

## 2019-06-17 RX ORDER — AZACITIDINE 100 MG/1
75 INJECTION, POWDER, LYOPHILIZED, FOR SOLUTION INTRAVENOUS; SUBCUTANEOUS
Status: CANCELLED | OUTPATIENT
Start: 2019-06-18

## 2019-06-17 RX ORDER — AZACITIDINE 100 MG/1
75 INJECTION, POWDER, LYOPHILIZED, FOR SOLUTION INTRAVENOUS; SUBCUTANEOUS
Status: CANCELLED | OUTPATIENT
Start: 2019-06-19

## 2019-06-17 RX ORDER — ACETAMINOPHEN 325 MG/1
650 TABLET ORAL
Status: CANCELLED | OUTPATIENT
Start: 2019-06-17

## 2019-06-17 RX ORDER — ONDANSETRON 2 MG/ML
8 INJECTION INTRAMUSCULAR; INTRAVENOUS
Status: CANCELLED
Start: 2019-06-18

## 2019-06-17 RX ORDER — ONDANSETRON 2 MG/ML
8 INJECTION INTRAMUSCULAR; INTRAVENOUS
Status: COMPLETED | OUTPATIENT
Start: 2019-06-17 | End: 2019-06-17

## 2019-06-17 RX ORDER — HYDROCODONE BITARTRATE AND ACETAMINOPHEN 500; 5 MG/1; MG/1
TABLET ORAL ONCE
Status: CANCELLED | OUTPATIENT
Start: 2019-06-17 | End: 2019-06-17

## 2019-06-17 RX ORDER — HYDROCODONE BITARTRATE AND ACETAMINOPHEN 500; 5 MG/1; MG/1
TABLET ORAL ONCE
Status: DISCONTINUED | OUTPATIENT
Start: 2019-06-17 | End: 2019-06-17 | Stop reason: HOSPADM

## 2019-06-17 RX ORDER — ACETAMINOPHEN 325 MG/1
650 TABLET ORAL
Status: COMPLETED | OUTPATIENT
Start: 2019-06-17 | End: 2019-06-17

## 2019-06-17 RX ORDER — CIPROFLOXACIN 500 MG/1
500 TABLET ORAL 2 TIMES DAILY
Qty: 60 TABLET | Refills: 5 | Status: SHIPPED | OUTPATIENT
Start: 2019-06-17 | End: 2019-10-09

## 2019-06-17 RX ORDER — AZACITIDINE 100 MG/1
75 INJECTION, POWDER, LYOPHILIZED, FOR SOLUTION INTRAVENOUS; SUBCUTANEOUS
Status: CANCELLED | OUTPATIENT
Start: 2019-06-20

## 2019-06-17 RX ORDER — ONDANSETRON 2 MG/ML
8 INJECTION INTRAMUSCULAR; INTRAVENOUS
Status: CANCELLED
Start: 2019-06-21

## 2019-06-17 RX ORDER — DIPHENHYDRAMINE HCL 25 MG
25 CAPSULE ORAL
Status: CANCELLED | OUTPATIENT
Start: 2019-06-17

## 2019-06-17 RX ORDER — AZACITIDINE 100 MG/1
75 INJECTION, POWDER, LYOPHILIZED, FOR SOLUTION INTRAVENOUS; SUBCUTANEOUS
Status: COMPLETED | OUTPATIENT
Start: 2019-06-17 | End: 2019-06-17

## 2019-06-17 RX ADMIN — AZACITIDINE 150 MG: 100 INJECTION, POWDER, LYOPHILIZED, FOR SOLUTION INTRAVENOUS; SUBCUTANEOUS at 01:06

## 2019-06-17 NOTE — PROGRESS NOTES
Subjective:       Patient ID: Susan Puente is a 68 y.o. female.    Chief Complaint: Follow-up    Patient presents today for follow up of her history of MDS 5 q del syndrome.  Revlimid has been stopped since June 2017 after she developed pancytopenia while on Revlimid (required desensitization). CBC was normal for almost 1 year and marrow was negative for del5q. Bone marrow biopsy repeat from 6/2018 showed relapsed 5q minus MDS without new or additional mutations. Revlimid restarted at 2.5 mg daily with prednisone to prevent allergic reaction titrated to a goal of 10mg daily Revlimid. Hospital admission 3/12-3/17/19 for unresponsive event after blood transfusion, found to be profoundly pancytopenic at admission. Since hospital admission Revlimid has been stopped and CBC has improved. Repeat marrow March 2019 shows persistent MDS with 5q minus. CBC is stable. Visit today prior to cycle 2 Vidaza as third line therapy for 5q minus syndrome. Still requiring weekly transfusion of blood for symptomatic anemia. Neutropenic today without fevers.    Oncology History   Ms. Puente is a 68 year old female with hx of DM2, peripheral vascular disease, tobacco use, CAD, hyperlipidemia, hypertension who was hospitalized 11/10/16 for anemia. hgb 5.3  MCH 43.4 with normal WBC and platelets. Patient had normal iron stores. She was transfused 3 units of PRBCs and discharged home with hgb 8.7 on 11/11/16. She was referred for further evalutation of her anemia. On 12/16/16 patient had a normal SPEP and immunofixation. Slightly elevated kappa light chains with normal ration. Elevated vitamin b12, normal folate, JAYDEN was positive with a low titer and negative profile. Patient had a bone marrow biopsy 1/5/16 which showed the core biopsy is normocellular for age (40%); however, megakaryocytes are increased and show frequent small, hypolobated forms. Additionally, a subset of the neutrophils are hypogranular. Blasts are not  increased by either morphology (1.2%) or in the corresponding flow cytometric analysis. Fish detects a 5q deletion in 54.5% of nuclei. Cytogenetics reported 20 metaphases, 2 metaphases were normal and 18 metaphases had a 5q deletion. No additional cytogenetic abnormalities were detected. Findings consistent with 5q deletion syndrome.     Patient had a delay in obtaining Revlimid 10 mg daily but did start taking the medication 2/8/17. On 2/9/17 patient developed a diffuse maculopapular rash throughout scalp arms, legs and torso. She states she had no stridor or wheezing. She discontinued medication 2/15/17. Went to allergist who provided references on desensitization so that patient could resume Revlimid. Unfortunately developed pancytopenia and Revlimid stopped 6/16/17. She had a repeat BMBX  performed 6/8/17 and showed a hypercellular marrow (60%) continued atypia in the granulocytes and megakaryocytes were noted.  Additionally, there is erythroid atypia present. No increase in blasts. Cytogenetics are normal and MDS FISH is negative, failing to show 5 q minus. NGS should no significant molecular mutations.  Anemia work up revealed bienvenido negative hemolysis.      Review of Systems   Constitutional: Positive for fatigue. Negative for fever.   HENT: Negative for sinus congestion or rhinorrhea. Negative for ear pain, mouth sores, nosebleeds and trouble swallowing.    Eyes:      Respiratory: Negative for cough, shortness of breath and wheezing.    Cardiovascular: Negative for chest pain and leg swelling.   Gastrointestinal: Negative for abdominal distention, abdominal pain, blood in stool, constipation, diarrhea, nausea and vomiting.        Improved with Imodium   Endocrine: Negative for polyphagia and polyuria.   Genitourinary: Negative for dysuria, hematuria and urgency.   Musculoskeletal: Positive for arthralgias. Negative for myalgias.   Skin: Negative for color change, pallor and rash.   Neurological: Positive for  numbness (to balls of feet; likely secondary to DM2). Negative for dizziness, weakness, light-headedness and headaches.   Hematological: Negative for adenopathy. Does not bruise/bleed easily.   Psychiatric/Behavioral: Negative for agitation and behavioral problems.       Objective:      Physical Exam   Constitutional: She is oriented to person, place, and time. She appears well-developed and well-nourished.   HENT:   Head: Normocephalic and atraumatic.   Right Ear: External ear normal.   Left Ear: External ear normal.   Mouth/Throat: Oropharynx is clear and moist. No oropharyngeal exudate.   Eyes: Conjunctivae and EOM are normal. Pupils are equal, round, and reactive to light. Right eye exhibits no discharge. Left eye exhibits no discharge.   Neck: Normal range of motion. Neck supple.   Cardiovascular: Normal rate, regular rhythm, normal heart sounds and intact distal pulses.   No murmur heard.  Pulmonary/Chest: Effort normal and breath sounds normal. No respiratory distress. She has no wheezes.   Abdominal: Soft. Bowel sounds are normal. She exhibits no distension and no mass. There is no tenderness.   Musculoskeletal: Normal range of motion. She exhibits no edema.   Lymphadenopathy:     She has no cervical adenopathy.   Neurological: She is alert and oriented to person, place, and time.   Skin: Skin is warm and dry. No rash noted.   Psychiatric: She has a normal mood and affect. Her behavior is normal. Judgment and thought content normal.   Nursing note and vitals reviewed.      Assessment:       1. 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 Chemotherapy induced neutropenia        Plan:     MDS/Pancytopenia  Initially with 5 q minus syndrome and treated with Revlimid. Developed allergy and was then desensitized. Soon after developed pancytopenia and Revlimid held since June 2017.  BMBX,6/8/17, had normal cytogenetics. Repeat marrow from 6/7/18 shows relapsed 5q minus.  Discussed treatment options of HMA therapy or repeat trial of Revlimid and patient wished to proceed with Revlimid   Revlimid stopped again due to repeat allergic reaction and pancytopenia 3/2019  Started cycle 1 Vidaza 5/6/19 subq for 5 days every 28 days; consent completed today    Stage 3 CKD  - stable; continue to monitor with visits    DM2  - management per PCP; BG WNL today  - continue metformin; educated to perform daily foot checks with neuropathy to feet    HTN  - management per PCP  - continue treatment with lisinopril-HCTZ    CAD  - attempting to get praluent for HLD per PCP; intolerant to statins; on ASA    Anxiety/Depression  She reports improved mood now on Celexa. Continue.     F/U  Cycle 2 Vidaza today with 1 unit blood transfusion  CBC and type and screen weekly  Return visit with me or NP cycle 3 vidaza 7/15 - 7/19/9 with CBC, type and screen, and CMP

## 2019-06-17 NOTE — Clinical Note
CBC and type and screen weeklyReturn visit with me or NP cycle 3 vidaza 7/15 - 7/19/19 with CBC, type and screen, and CMP

## 2019-06-17 NOTE — PLAN OF CARE
Problem: Anemia  Goal: Anemia Symptom Improvement  Outcome: Ongoing (interventions implemented as appropriate)  Pt here for 1 unit PRBC and will receive Vidaza injection, labs, hx, meds, allergies reviewed, pt with c/o feeling tired, reclined in chair, continue to monitor

## 2019-06-17 NOTE — PLAN OF CARE
Problem: Adult Inpatient Plan of Care  Goal: Plan of Care Review  Outcome: Ongoing (interventions implemented as appropriate)  Pt tolerated vidaza injections and 1 unit prbc, pt has no upcoming appts at this time, no distress noted upon d/c to home

## 2019-06-18 ENCOUNTER — INFUSION (OUTPATIENT)
Dept: INFUSION THERAPY | Facility: HOSPITAL | Age: 69
End: 2019-06-18
Attending: INTERNAL MEDICINE
Payer: MEDICARE

## 2019-06-18 VITALS — SYSTOLIC BLOOD PRESSURE: 129 MMHG | DIASTOLIC BLOOD PRESSURE: 59 MMHG | HEART RATE: 99 BPM

## 2019-06-18 DIAGNOSIS — D46.C MDS (MYELODYSPLASTIC SYNDROME) WITH 5Q DELETION: Primary | ICD-10-CM

## 2019-06-18 PROCEDURE — 96401 CHEMO ANTI-NEOPL SQ/IM: CPT | Mod: HCNC

## 2019-06-18 PROCEDURE — 63600175 PHARM REV CODE 636 W HCPCS: Mod: HCNC,JG | Performed by: INTERNAL MEDICINE

## 2019-06-18 RX ORDER — ONDANSETRON 2 MG/ML
8 INJECTION INTRAMUSCULAR; INTRAVENOUS
Status: DISCONTINUED | OUTPATIENT
Start: 2019-06-18 | End: 2019-06-18 | Stop reason: HOSPADM

## 2019-06-18 RX ORDER — AZACITIDINE 100 MG/1
75 INJECTION, POWDER, LYOPHILIZED, FOR SOLUTION INTRAVENOUS; SUBCUTANEOUS
Status: COMPLETED | OUTPATIENT
Start: 2019-06-18 | End: 2019-06-18

## 2019-06-18 RX ADMIN — AZACITIDINE 150 MG: 100 INJECTION, POWDER, LYOPHILIZED, FOR SOLUTION INTRAVENOUS; SUBCUTANEOUS at 02:06

## 2019-06-19 ENCOUNTER — INFUSION (OUTPATIENT)
Dept: INFUSION THERAPY | Facility: HOSPITAL | Age: 69
End: 2019-06-19
Attending: INTERNAL MEDICINE
Payer: MEDICARE

## 2019-06-19 VITALS — DIASTOLIC BLOOD PRESSURE: 59 MMHG | HEART RATE: 97 BPM | SYSTOLIC BLOOD PRESSURE: 125 MMHG

## 2019-06-19 DIAGNOSIS — D46.C MDS (MYELODYSPLASTIC SYNDROME) WITH 5Q DELETION: Primary | ICD-10-CM

## 2019-06-19 PROCEDURE — 96401 CHEMO ANTI-NEOPL SQ/IM: CPT | Mod: HCNC

## 2019-06-19 PROCEDURE — 63600175 PHARM REV CODE 636 W HCPCS: Mod: HCNC,JG | Performed by: INTERNAL MEDICINE

## 2019-06-19 RX ORDER — AZACITIDINE 100 MG/1
75 INJECTION, POWDER, LYOPHILIZED, FOR SOLUTION INTRAVENOUS; SUBCUTANEOUS
Status: COMPLETED | OUTPATIENT
Start: 2019-06-19 | End: 2019-06-19

## 2019-06-19 RX ADMIN — AZACITIDINE 150 MG: 100 INJECTION, POWDER, LYOPHILIZED, FOR SOLUTION INTRAVENOUS; SUBCUTANEOUS at 02:06

## 2019-06-19 NOTE — NURSING
"Patient here for vidaza injections-complains of "blisters in mouth"-advised patient to see Dr Valdes today to get Rx for mouth sores-patient states will stop om 3rd floor-injections given in 3 divided doses-tolerated well.  "

## 2019-06-20 ENCOUNTER — PATIENT MESSAGE (OUTPATIENT)
Dept: HEMATOLOGY/ONCOLOGY | Facility: CLINIC | Age: 69
End: 2019-06-20

## 2019-06-20 ENCOUNTER — INFUSION (OUTPATIENT)
Dept: INFUSION THERAPY | Facility: HOSPITAL | Age: 69
End: 2019-06-20
Attending: NURSE PRACTITIONER
Payer: MEDICARE

## 2019-06-20 VITALS — WEIGHT: 191.81 LBS | BODY MASS INDEX: 31.96 KG/M2 | HEIGHT: 65 IN

## 2019-06-20 DIAGNOSIS — D46.C MDS (MYELODYSPLASTIC SYNDROME) WITH 5Q DELETION: Primary | ICD-10-CM

## 2019-06-20 PROCEDURE — 63600175 PHARM REV CODE 636 W HCPCS: Mod: HCNC,JG | Performed by: INTERNAL MEDICINE

## 2019-06-20 PROCEDURE — 96401 CHEMO ANTI-NEOPL SQ/IM: CPT | Mod: HCNC

## 2019-06-20 RX ORDER — AZACITIDINE 100 MG/1
75 INJECTION, POWDER, LYOPHILIZED, FOR SOLUTION INTRAVENOUS; SUBCUTANEOUS
Status: COMPLETED | OUTPATIENT
Start: 2019-06-20 | End: 2019-06-20

## 2019-06-20 RX ADMIN — AZACITIDINE 150 MG: 100 INJECTION, POWDER, LYOPHILIZED, FOR SOLUTION INTRAVENOUS; SUBCUTANEOUS at 02:06

## 2019-06-21 ENCOUNTER — INFUSION (OUTPATIENT)
Dept: INFUSION THERAPY | Facility: HOSPITAL | Age: 69
End: 2019-06-21
Attending: INTERNAL MEDICINE
Payer: MEDICARE

## 2019-06-21 VITALS
WEIGHT: 191.81 LBS | HEART RATE: 89 BPM | BODY MASS INDEX: 31.96 KG/M2 | TEMPERATURE: 98 F | RESPIRATION RATE: 18 BRPM | DIASTOLIC BLOOD PRESSURE: 68 MMHG | HEIGHT: 65 IN | SYSTOLIC BLOOD PRESSURE: 120 MMHG

## 2019-06-21 DIAGNOSIS — D46.C MDS (MYELODYSPLASTIC SYNDROME) WITH 5Q DELETION: Primary | ICD-10-CM

## 2019-06-21 PROCEDURE — 96402 CHEMO HORMON ANTINEOPL SQ/IM: CPT | Mod: HCNC

## 2019-06-21 PROCEDURE — 63600175 PHARM REV CODE 636 W HCPCS: Mod: HCNC,JG | Performed by: INTERNAL MEDICINE

## 2019-06-21 RX ORDER — AZACITIDINE 100 MG/1
75 INJECTION, POWDER, LYOPHILIZED, FOR SOLUTION INTRAVENOUS; SUBCUTANEOUS
Status: COMPLETED | OUTPATIENT
Start: 2019-06-21 | End: 2019-06-21

## 2019-06-21 RX ADMIN — AZACITIDINE 150 MG: 100 INJECTION, POWDER, LYOPHILIZED, FOR SOLUTION INTRAVENOUS; SUBCUTANEOUS at 02:06

## 2019-06-21 NOTE — NURSING
Arrived for D5 vidaza injection. Denies any complaints. Tolerated well. DC home ambulating independently. Verbalized understanding of s/s to report to MD.

## 2019-06-24 ENCOUNTER — LAB VISIT (OUTPATIENT)
Dept: LAB | Facility: HOSPITAL | Age: 69
End: 2019-06-24
Attending: INTERNAL MEDICINE
Payer: MEDICARE

## 2019-06-24 DIAGNOSIS — D46.9 MDS (MYELODYSPLASTIC SYNDROME): ICD-10-CM

## 2019-06-24 DIAGNOSIS — D46.C MDS (MYELODYSPLASTIC SYNDROME) WITH 5Q DELETION: ICD-10-CM

## 2019-06-24 LAB
ABO + RH BLD: NORMAL
ANISOCYTOSIS BLD QL SMEAR: SLIGHT
BASOPHILS # BLD AUTO: 0.01 K/UL (ref 0–0.2)
BASOPHILS NFR BLD: 0.5 % (ref 0–1.9)
BLD GP AB SCN CELLS X3 SERPL QL: NORMAL
DIFFERENTIAL METHOD: ABNORMAL
EOSINOPHIL # BLD AUTO: 0.1 K/UL (ref 0–0.5)
EOSINOPHIL NFR BLD: 5 % (ref 0–8)
ERYTHROCYTE [DISTWIDTH] IN BLOOD BY AUTOMATED COUNT: 19.5 % (ref 11.5–14.5)
HCT VFR BLD AUTO: 23.6 % (ref 37–48.5)
HGB BLD-MCNC: 7.7 G/DL (ref 12–16)
HYPOCHROMIA BLD QL SMEAR: ABNORMAL
IMM GRANULOCYTES # BLD AUTO: 0.02 K/UL (ref 0–0.04)
IMM GRANULOCYTES NFR BLD AUTO: 1 % (ref 0–0.5)
LYMPHOCYTES # BLD AUTO: 1 K/UL (ref 1–4.8)
LYMPHOCYTES NFR BLD: 48 % (ref 18–48)
MCH RBC QN AUTO: 32.2 PG (ref 27–31)
MCHC RBC AUTO-ENTMCNC: 32.6 G/DL (ref 32–36)
MCV RBC AUTO: 99 FL (ref 82–98)
MONOCYTES # BLD AUTO: 0.1 K/UL (ref 0.3–1)
MONOCYTES NFR BLD: 3 % (ref 4–15)
NEUTROPHILS # BLD AUTO: 0.9 K/UL (ref 1.8–7.7)
NEUTROPHILS NFR BLD: 42.5 % (ref 38–73)
NRBC BLD-RTO: 0 /100 WBC
PLATELET # BLD AUTO: 138 K/UL (ref 150–350)
PLATELET BLD QL SMEAR: ABNORMAL
PMV BLD AUTO: 10.7 FL (ref 9.2–12.9)
POLYCHROMASIA BLD QL SMEAR: ABNORMAL
RBC # BLD AUTO: 2.39 M/UL (ref 4–5.4)
WBC # BLD AUTO: 2.02 K/UL (ref 3.9–12.7)

## 2019-06-24 PROCEDURE — 85025 COMPLETE CBC W/AUTO DIFF WBC: CPT | Mod: HCNC

## 2019-06-24 PROCEDURE — 36415 COLL VENOUS BLD VENIPUNCTURE: CPT | Mod: HCNC

## 2019-06-24 PROCEDURE — 86901 BLOOD TYPING SEROLOGIC RH(D): CPT | Mod: HCNC

## 2019-07-01 ENCOUNTER — TELEPHONE (OUTPATIENT)
Dept: HEMATOLOGY/ONCOLOGY | Facility: CLINIC | Age: 69
End: 2019-07-01

## 2019-07-01 ENCOUNTER — LAB VISIT (OUTPATIENT)
Dept: LAB | Facility: HOSPITAL | Age: 69
End: 2019-07-01
Attending: INTERNAL MEDICINE
Payer: MEDICARE

## 2019-07-01 DIAGNOSIS — D46.C MDS (MYELODYSPLASTIC SYNDROME) WITH 5Q DELETION: ICD-10-CM

## 2019-07-01 DIAGNOSIS — D46.9 MDS (MYELODYSPLASTIC SYNDROME): ICD-10-CM

## 2019-07-01 DIAGNOSIS — D46.C MDS (MYELODYSPLASTIC SYNDROME) WITH 5Q DELETION: Primary | ICD-10-CM

## 2019-07-01 LAB
ABO + RH BLD: NORMAL
BASOPHILS # BLD AUTO: 0 K/UL (ref 0–0.2)
BASOPHILS NFR BLD: 0 % (ref 0–1.9)
BLD GP AB SCN CELLS X3 SERPL QL: NORMAL
DIFFERENTIAL METHOD: ABNORMAL
EOSINOPHIL # BLD AUTO: 0.1 K/UL (ref 0–0.5)
EOSINOPHIL NFR BLD: 3.8 % (ref 0–8)
ERYTHROCYTE [DISTWIDTH] IN BLOOD BY AUTOMATED COUNT: 19.3 % (ref 11.5–14.5)
HCT VFR BLD AUTO: 21.1 % (ref 37–48.5)
HGB BLD-MCNC: 6.8 G/DL (ref 12–16)
IMM GRANULOCYTES # BLD AUTO: 0.02 K/UL (ref 0–0.04)
IMM GRANULOCYTES NFR BLD AUTO: 1 % (ref 0–0.5)
LYMPHOCYTES # BLD AUTO: 1 K/UL (ref 1–4.8)
LYMPHOCYTES NFR BLD: 47.6 % (ref 18–48)
MCH RBC QN AUTO: 32.4 PG (ref 27–31)
MCHC RBC AUTO-ENTMCNC: 32.2 G/DL (ref 32–36)
MCV RBC AUTO: 101 FL (ref 82–98)
MONOCYTES # BLD AUTO: 0 K/UL (ref 0.3–1)
MONOCYTES NFR BLD: 1.4 % (ref 4–15)
NEUTROPHILS # BLD AUTO: 1 K/UL (ref 1.8–7.7)
NEUTROPHILS NFR BLD: 46.2 % (ref 38–73)
NRBC BLD-RTO: 0 /100 WBC
PLATELET # BLD AUTO: 70 K/UL (ref 150–350)
PMV BLD AUTO: 11.8 FL (ref 9.2–12.9)
RBC # BLD AUTO: 2.1 M/UL (ref 4–5.4)
WBC # BLD AUTO: 2.08 K/UL (ref 3.9–12.7)

## 2019-07-01 PROCEDURE — 85025 COMPLETE CBC W/AUTO DIFF WBC: CPT | Mod: HCNC

## 2019-07-01 PROCEDURE — 27201040 HC RC 50 FILTER: Mod: HCNC

## 2019-07-01 PROCEDURE — 86920 COMPATIBILITY TEST SPIN: CPT | Mod: HCNC

## 2019-07-01 PROCEDURE — 86850 RBC ANTIBODY SCREEN: CPT | Mod: HCNC

## 2019-07-01 RX ORDER — HYDROCODONE BITARTRATE AND ACETAMINOPHEN 500; 5 MG/1; MG/1
TABLET ORAL ONCE
Status: CANCELLED | OUTPATIENT
Start: 2019-07-01 | End: 2019-07-01

## 2019-07-01 RX ORDER — ACETAMINOPHEN 325 MG/1
650 TABLET ORAL
Status: CANCELLED | OUTPATIENT
Start: 2019-07-01

## 2019-07-01 RX ORDER — DIPHENHYDRAMINE HCL 25 MG
25 CAPSULE ORAL
Status: CANCELLED | OUTPATIENT
Start: 2019-07-01

## 2019-07-02 ENCOUNTER — INFUSION (OUTPATIENT)
Dept: INFUSION THERAPY | Facility: HOSPITAL | Age: 69
End: 2019-07-02
Attending: INTERNAL MEDICINE
Payer: MEDICARE

## 2019-07-02 VITALS
DIASTOLIC BLOOD PRESSURE: 66 MMHG | TEMPERATURE: 99 F | RESPIRATION RATE: 18 BRPM | HEART RATE: 77 BPM | SYSTOLIC BLOOD PRESSURE: 137 MMHG

## 2019-07-02 DIAGNOSIS — D46.C MDS (MYELODYSPLASTIC SYNDROME) WITH 5Q DELETION: ICD-10-CM

## 2019-07-02 PROCEDURE — 36430 TRANSFUSION BLD/BLD COMPNT: CPT | Mod: HCNC

## 2019-07-02 PROCEDURE — P9038 RBC IRRADIATED: HCPCS | Mod: HCNC

## 2019-07-02 PROCEDURE — 25000003 PHARM REV CODE 250: Mod: HCNC | Performed by: NURSE PRACTITIONER

## 2019-07-02 RX ORDER — ACETAMINOPHEN 325 MG/1
650 TABLET ORAL
Status: COMPLETED | OUTPATIENT
Start: 2019-07-02 | End: 2019-07-02

## 2019-07-02 RX ORDER — HYDROCODONE BITARTRATE AND ACETAMINOPHEN 500; 5 MG/1; MG/1
TABLET ORAL ONCE
Status: DISCONTINUED | OUTPATIENT
Start: 2019-07-02 | End: 2019-07-02 | Stop reason: HOSPADM

## 2019-07-02 RX ORDER — DIPHENHYDRAMINE HCL 25 MG
25 CAPSULE ORAL
Status: COMPLETED | OUTPATIENT
Start: 2019-07-02 | End: 2019-07-02

## 2019-07-02 RX ADMIN — ACETAMINOPHEN 650 MG: 325 TABLET ORAL at 03:07

## 2019-07-02 RX ADMIN — DIPHENHYDRAMINE HYDROCHLORIDE 25 MG: 25 CAPSULE ORAL at 03:07

## 2019-07-02 NOTE — PLAN OF CARE
Problem: Adult Inpatient Plan of Care  Goal: Plan of Care Review  Outcome: Ongoing (interventions implemented as appropriate)  Pt tolerated 1unit PRBC well.  No s/s of reaction. Vitals stable, NAD.

## 2019-07-08 ENCOUNTER — PATIENT MESSAGE (OUTPATIENT)
Dept: HEMATOLOGY/ONCOLOGY | Facility: CLINIC | Age: 69
End: 2019-07-08

## 2019-07-08 ENCOUNTER — TELEPHONE (OUTPATIENT)
Dept: HEMATOLOGY/ONCOLOGY | Facility: CLINIC | Age: 69
End: 2019-07-08

## 2019-07-08 DIAGNOSIS — D46.C MDS (MYELODYSPLASTIC SYNDROME) WITH 5Q DELETION: Primary | ICD-10-CM

## 2019-07-08 PROCEDURE — 86920 COMPATIBILITY TEST SPIN: CPT | Mod: HCNC

## 2019-07-08 RX ORDER — HYDROCODONE BITARTRATE AND ACETAMINOPHEN 500; 5 MG/1; MG/1
TABLET ORAL ONCE
Status: CANCELLED | OUTPATIENT
Start: 2019-07-08 | End: 2019-07-08

## 2019-07-08 RX ORDER — ACETAMINOPHEN 325 MG/1
650 TABLET ORAL
Status: CANCELLED | OUTPATIENT
Start: 2019-07-08

## 2019-07-08 RX ORDER — DIPHENHYDRAMINE HCL 25 MG
25 CAPSULE ORAL
Status: CANCELLED | OUTPATIENT
Start: 2019-07-08

## 2019-07-08 NOTE — PROGRESS NOTES
Ordered 1 unit prbc for hgb of 6.6.     Altagracia Cornejo, FNP  Hematology/Oncology/Bone Marrow Transplant

## 2019-07-09 ENCOUNTER — INFUSION (OUTPATIENT)
Dept: INFUSION THERAPY | Facility: HOSPITAL | Age: 69
End: 2019-07-09
Attending: INTERNAL MEDICINE
Payer: MEDICARE

## 2019-07-09 VITALS
SYSTOLIC BLOOD PRESSURE: 130 MMHG | TEMPERATURE: 98 F | DIASTOLIC BLOOD PRESSURE: 60 MMHG | HEART RATE: 88 BPM | RESPIRATION RATE: 18 BRPM

## 2019-07-09 DIAGNOSIS — D46.C MDS (MYELODYSPLASTIC SYNDROME) WITH 5Q DELETION: ICD-10-CM

## 2019-07-09 DIAGNOSIS — I25.10 CORONARY ARTERY DISEASE, ANGINA PRESENCE UNSPECIFIED, UNSPECIFIED VESSEL OR LESION TYPE, UNSPECIFIED WHETHER NATIVE OR TRANSPLANTED HEART: ICD-10-CM

## 2019-07-09 PROCEDURE — P9040 RBC LEUKOREDUCED IRRADIATED: HCPCS | Mod: HCNC

## 2019-07-09 PROCEDURE — 25000003 PHARM REV CODE 250: Mod: HCNC | Performed by: NURSE PRACTITIONER

## 2019-07-09 PROCEDURE — 36430 TRANSFUSION BLD/BLD COMPNT: CPT | Mod: HCNC

## 2019-07-09 RX ORDER — LISINOPRIL AND HYDROCHLOROTHIAZIDE 12.5; 2 MG/1; MG/1
2 TABLET ORAL DAILY
Qty: 180 TABLET | Refills: 3 | Status: SHIPPED | OUTPATIENT
Start: 2019-07-09 | End: 2019-08-21

## 2019-07-09 RX ORDER — DIPHENHYDRAMINE HCL 25 MG
25 CAPSULE ORAL
Status: COMPLETED | OUTPATIENT
Start: 2019-07-09 | End: 2019-07-09

## 2019-07-09 RX ORDER — HYDROCODONE BITARTRATE AND ACETAMINOPHEN 500; 5 MG/1; MG/1
TABLET ORAL ONCE
Status: COMPLETED | OUTPATIENT
Start: 2019-07-09 | End: 2019-07-09

## 2019-07-09 RX ORDER — ACETAMINOPHEN 325 MG/1
650 TABLET ORAL
Status: COMPLETED | OUTPATIENT
Start: 2019-07-09 | End: 2019-07-09

## 2019-07-09 RX ORDER — LISINOPRIL AND HYDROCHLOROTHIAZIDE 12.5; 2 MG/1; MG/1
2 TABLET ORAL DAILY
Qty: 180 TABLET | Refills: 3 | OUTPATIENT
Start: 2019-07-09

## 2019-07-09 RX ADMIN — ACETAMINOPHEN 650 MG: 325 TABLET ORAL at 12:07

## 2019-07-09 RX ADMIN — DIPHENHYDRAMINE HYDROCHLORIDE 25 MG: 25 CAPSULE ORAL at 12:07

## 2019-07-09 RX ADMIN — SODIUM CHLORIDE: 0.9 INJECTION, SOLUTION INTRAVENOUS at 12:07

## 2019-07-09 NOTE — PLAN OF CARE
Problem: Adult Inpatient Plan of Care  Goal: Plan of Care Review  Outcome: Ongoing (interventions implemented as appropriate)  Pt tolerated 1u PRBC without adverse effects. VSS. Provided AVS & verbalized understanding of RTC date. DC ambulating independently.

## 2019-07-09 NOTE — TELEPHONE ENCOUNTER
----- Message from Sapna Russell MA sent at 7/9/2019  1:30 PM CDT -----  Contact: patient called  The patient would like to talk to you about order some blood work. Please call [phone]. Thank you.

## 2019-07-12 NOTE — PROGRESS NOTES
Subjective:       Patient ID: Susan Puente is a 68 y.o. female.    Chief Complaint: Results (labs)    Patient presents today for follow up of her history of MDS 5 q del syndrome.  Revlimid has been stopped since June 2017 after she developed pancytopenia while on Revlimid (required desensitization). CBC was normal for almost 1 year and marrow was negative for del5q. Bone marrow biopsy repeat from 6/2018 showed relapsed 5q minus MDS without new or additional mutations. Revlimid restarted at 2.5 mg daily with prednisone to prevent allergic reaction. Titrated to a goal of 10mg daily Revlimid. Hospital admission 3/12-3/17/19 for unresponsive event after blood transfusion, found to be profoundly pancytopenic at admission. Since hospital admission Revlimid has been stopped and CBC has improved. Repeat marrow March 2019 shows persistent MDS with 5q minus. CBC is stable. Visit today prior to cycle 3 Vidaza as third line therapy for 5q minus syndrome. Still requiring nearly weekly transfusions of blood for symptomatic anemia. Neutropenic today. C/o fatigue and SOB on exertion. Denies fevers, chills, n/v/d/c, night sweats, and chest pain. Presents to clinic alone.     Oncology History   Ms. Puente is a 68 year old female with hx of DM2, peripheral vascular disease, tobacco use, CAD, hyperlipidemia, hypertension who was hospitalized 11/10/16 for anemia. hgb 5.3  MCH 43.4 with normal WBC and platelets. Patient had normal iron stores. She was transfused 3 units of PRBCs and discharged home with hgb 8.7 on 11/11/16. She was referred for further evalutation of her anemia. On 12/16/16 patient had a normal SPEP and immunofixation. Slightly elevated kappa light chains with normal ration. Elevated vitamin b12, normal folate, JAYDEN was positive with a low titer and negative profile. Patient had a bone marrow biopsy 1/5/16 which showed the core biopsy is normocellular for age (40%); however, megakaryocytes are increased  and show frequent small, hypolobated forms. Additionally, a subset of the neutrophils are hypogranular. Blasts are not increased by either morphology (1.2%) or in the corresponding flow cytometric analysis. Fish detects a 5q deletion in 54.5% of nuclei. Cytogenetics reported 20 metaphases, 2 metaphases were normal and 18 metaphases had a 5q deletion. No additional cytogenetic abnormalities were detected. Findings consistent with 5q deletion syndrome.     Patient had a delay in obtaining Revlimid 10 mg daily but did start taking the medication 2/8/17. On 2/9/17 patient developed a diffuse maculopapular rash throughout scalp arms, legs and torso. She states she had no stridor or wheezing. She discontinued medication 2/15/17. Went to allergist who provided references on desensitization so that patient could resume Revlimid. Unfortunately developed pancytopenia and Revlimid stopped 6/16/17. She had a repeat BMBX  performed 6/8/17 and showed a hypercellular marrow (60%) continued atypia in the granulocytes and megakaryocytes were noted.  Additionally, there is erythroid atypia present. No increase in blasts. Cytogenetics are normal and MDS FISH is negative, failing to show 5 q minus. NGS should no significant molecular mutations.  Anemia work up revealed bienvenido negative hemolysis.      Review of Systems   Constitutional: Positive for fatigue. Negative for fever.   HENT: Negative for sinus congestion or rhinorrhea. Negative for ear pain, mouth sores, nosebleeds and trouble swallowing.    Eyes:      Respiratory: Negative for cough, shortness of breath and wheezing.    Cardiovascular: Negative for chest pain and leg swelling.   Gastrointestinal: Negative for abdominal distention, abdominal pain, blood in stool, constipation, diarrhea, nausea and vomiting.   Endocrine: Negative for polyphagia and polyuria.   Genitourinary: Negative for dysuria, hematuria and urgency.   Musculoskeletal: Positive for arthralgias. Negative for  myalgias.   Skin: Negative for color change, pallor and rash.   Neurological: Positive for numbness (to balls of feet; likely secondary to DM2). Negative for dizziness, weakness, light-headedness and headaches.   Hematological: Negative for adenopathy. Does not bruise/bleed easily.   Psychiatric/Behavioral: Negative for agitation and behavioral problems.       Objective:      Physical Exam   Constitutional: She is oriented to person, place, and time. She appears well-developed and well-nourished.   HENT:   Head: Normocephalic and atraumatic.   Right Ear: External ear normal.   Left Ear: External ear normal.   Mouth/Throat: Oropharynx is clear and moist. No oropharyngeal exudate.   Eyes: Conjunctivae and EOM are normal. Pupils are equal, round, and reactive to light. Right eye exhibits no discharge. Left eye exhibits no discharge.   Neck: Normal range of motion. Neck supple.   Cardiovascular: Normal rate, regular rhythm, normal heart sounds and intact distal pulses.   No murmur heard.  Pulmonary/Chest: Effort normal and breath sounds normal. No respiratory distress. She has no wheezes.   Abdominal: Soft. Bowel sounds are normal. She exhibits no distension and no mass. There is no tenderness.   Musculoskeletal: Normal range of motion. She exhibits no edema.   Lymphadenopathy:     She has no cervical adenopathy.   Neurological: She is alert and oriented to person, place, and time.   Skin: Skin is warm and dry. No rash noted.   Psychiatric: She has a normal mood and affect. Her behavior is normal. Judgment and thought content normal.   Nursing note and vitals reviewed.      Assessment:       1. MDS (myelodysplastic syndrome) with 5q deletion    2. Pancytopenia    3. Stage 3 chronic kidney disease    4. Controlled type 2 diabetes mellitus with stage 3 chronic kidney disease, without long-term current use of insulin    5. Essential hypertension    6. Coronary artery disease, angina presence unspecified, unspecified vessel  or lesion type, unspecified whether native or transplanted heart    7. Adjustment disorder with mixed anxiety and depressed mood    8. Dehydration        Plan:     MDS  Initially with 5 q minus syndrome and treated with Revlimid. Developed allergy and was then desensitized. Soon after developed pancytopenia and Revlimid held since June 2017.  BMBX,6/8/17, had normal cytogenetics. Repeat marrow from 6/7/18 shows relapsed 5q minus. Discussed treatment options of HMA therapy or repeat trial of Revlimid and patient wished to proceed with Revlimid   Revlimid stopped again due to repeat allergic reaction and pancytopenia 3/2019  Started cycle 1 Vidaza 5/6/19 subq for 5 days every 28 days  Tolerated cycles 1 & 2 well. Okay to proceed with C3.    Pancytopenia  Transfuse for hgb < 7 or plts < 10K  WBC 1.51; ; hgb 8.0; plt 265K  Patient already taking cipro 500 mg BID. Will start acyclovir and fluconazole today    Stage 3 CKD  - stable; continue to monitor with visits    DM2  - management per PCP; BG WNL today  - continue metformin; educated to perform daily foot checks with neuropathy to feet    HTN  - management per PCP  - continue lisinopril-HCTZ    CAD  - continue praluent for HLD per PCP (intolerant to statins); continue ASA    Anxiety/Depression  Improved mood on Celexa. Continue.     Dehydration  - BUN 32; creat wnl  - encouraged increased oral fluid intake.    F/U  CBC and type and screen weekly  Return visit with CBC, type and screen, CMP and appt with Dr. Valdes or NP on 8/12/19. Chemo chair for cycle 4 vidaza 8/12/19 - 8/16/16     Altagracia Cornejo, ANDRYP  Hematology/Oncology/Bone Marrow Transplant

## 2019-07-15 ENCOUNTER — INFUSION (OUTPATIENT)
Dept: INFUSION THERAPY | Facility: HOSPITAL | Age: 69
End: 2019-07-15
Attending: INTERNAL MEDICINE
Payer: MEDICARE

## 2019-07-15 ENCOUNTER — DOCUMENTATION ONLY (OUTPATIENT)
Dept: HEMATOLOGY/ONCOLOGY | Facility: CLINIC | Age: 69
End: 2019-07-15

## 2019-07-15 ENCOUNTER — OFFICE VISIT (OUTPATIENT)
Dept: HEMATOLOGY/ONCOLOGY | Facility: CLINIC | Age: 69
End: 2019-07-15
Payer: MEDICARE

## 2019-07-15 VITALS
HEART RATE: 102 BPM | OXYGEN SATURATION: 95 % | SYSTOLIC BLOOD PRESSURE: 130 MMHG | BODY MASS INDEX: 32.29 KG/M2 | RESPIRATION RATE: 20 BRPM | DIASTOLIC BLOOD PRESSURE: 60 MMHG | WEIGHT: 193.81 LBS | TEMPERATURE: 98 F | HEIGHT: 65 IN

## 2019-07-15 DIAGNOSIS — F43.23 ADJUSTMENT DISORDER WITH MIXED ANXIETY AND DEPRESSED MOOD: ICD-10-CM

## 2019-07-15 DIAGNOSIS — D61.818 PANCYTOPENIA: ICD-10-CM

## 2019-07-15 DIAGNOSIS — I25.10 CORONARY ARTERY DISEASE, ANGINA PRESENCE UNSPECIFIED, UNSPECIFIED VESSEL OR LESION TYPE, UNSPECIFIED WHETHER NATIVE OR TRANSPLANTED HEART: ICD-10-CM

## 2019-07-15 DIAGNOSIS — D46.C MDS (MYELODYSPLASTIC SYNDROME) WITH 5Q DELETION: Primary | ICD-10-CM

## 2019-07-15 DIAGNOSIS — N18.30 STAGE 3 CHRONIC KIDNEY DISEASE: ICD-10-CM

## 2019-07-15 DIAGNOSIS — I10 ESSENTIAL HYPERTENSION: ICD-10-CM

## 2019-07-15 DIAGNOSIS — N18.30 CONTROLLED TYPE 2 DIABETES MELLITUS WITH STAGE 3 CHRONIC KIDNEY DISEASE, WITHOUT LONG-TERM CURRENT USE OF INSULIN: ICD-10-CM

## 2019-07-15 DIAGNOSIS — E11.22 CONTROLLED TYPE 2 DIABETES MELLITUS WITH STAGE 3 CHRONIC KIDNEY DISEASE, WITHOUT LONG-TERM CURRENT USE OF INSULIN: ICD-10-CM

## 2019-07-15 DIAGNOSIS — E86.0 DEHYDRATION: ICD-10-CM

## 2019-07-15 PROCEDURE — 3078F DIAST BP <80 MM HG: CPT | Mod: HCNC,CPTII,S$GLB, | Performed by: NURSE PRACTITIONER

## 2019-07-15 PROCEDURE — 99999 PR PBB SHADOW E&M-EST. PATIENT-LVL III: ICD-10-PCS | Mod: PBBFAC,HCNC,, | Performed by: NURSE PRACTITIONER

## 2019-07-15 PROCEDURE — 1101F PT FALLS ASSESS-DOCD LE1/YR: CPT | Mod: HCNC,CPTII,S$GLB, | Performed by: NURSE PRACTITIONER

## 2019-07-15 PROCEDURE — 3075F SYST BP GE 130 - 139MM HG: CPT | Mod: HCNC,CPTII,S$GLB, | Performed by: NURSE PRACTITIONER

## 2019-07-15 PROCEDURE — 99213 OFFICE O/P EST LOW 20 MIN: CPT | Mod: HCNC,S$GLB,, | Performed by: NURSE PRACTITIONER

## 2019-07-15 PROCEDURE — 99999 PR PBB SHADOW E&M-EST. PATIENT-LVL III: CPT | Mod: PBBFAC,HCNC,, | Performed by: NURSE PRACTITIONER

## 2019-07-15 PROCEDURE — 3078F PR MOST RECENT DIASTOLIC BLOOD PRESSURE < 80 MM HG: ICD-10-PCS | Mod: HCNC,CPTII,S$GLB, | Performed by: NURSE PRACTITIONER

## 2019-07-15 PROCEDURE — 96401 CHEMO ANTI-NEOPL SQ/IM: CPT | Mod: HCNC

## 2019-07-15 PROCEDURE — 63600175 PHARM REV CODE 636 W HCPCS: Mod: HCNC,JG | Performed by: INTERNAL MEDICINE

## 2019-07-15 PROCEDURE — 1101F PR PT FALLS ASSESS DOC 0-1 FALLS W/OUT INJ PAST YR: ICD-10-PCS | Mod: HCNC,CPTII,S$GLB, | Performed by: NURSE PRACTITIONER

## 2019-07-15 PROCEDURE — 99213 PR OFFICE/OUTPT VISIT, EST, LEVL III, 20-29 MIN: ICD-10-PCS | Mod: HCNC,S$GLB,, | Performed by: NURSE PRACTITIONER

## 2019-07-15 PROCEDURE — 3075F PR MOST RECENT SYSTOLIC BLOOD PRESS GE 130-139MM HG: ICD-10-PCS | Mod: HCNC,CPTII,S$GLB, | Performed by: NURSE PRACTITIONER

## 2019-07-15 RX ORDER — AZACITIDINE 100 MG/1
75 INJECTION, POWDER, LYOPHILIZED, FOR SOLUTION INTRAVENOUS; SUBCUTANEOUS
Status: COMPLETED | OUTPATIENT
Start: 2019-07-15 | End: 2019-07-15

## 2019-07-15 RX ORDER — ONDANSETRON 2 MG/ML
8 INJECTION INTRAMUSCULAR; INTRAVENOUS
Status: DISCONTINUED | OUTPATIENT
Start: 2019-07-15 | End: 2019-07-15 | Stop reason: HOSPADM

## 2019-07-15 RX ORDER — ONDANSETRON 2 MG/ML
8 INJECTION INTRAMUSCULAR; INTRAVENOUS
Status: CANCELLED
Start: 2019-07-19

## 2019-07-15 RX ORDER — AZACITIDINE 100 MG/1
75 INJECTION, POWDER, LYOPHILIZED, FOR SOLUTION INTRAVENOUS; SUBCUTANEOUS
Status: CANCELLED | OUTPATIENT
Start: 2019-07-15

## 2019-07-15 RX ORDER — ONDANSETRON 2 MG/ML
8 INJECTION INTRAMUSCULAR; INTRAVENOUS
Status: CANCELLED
Start: 2019-07-17

## 2019-07-15 RX ORDER — ONDANSETRON 2 MG/ML
8 INJECTION INTRAMUSCULAR; INTRAVENOUS
Status: CANCELLED
Start: 2019-07-18

## 2019-07-15 RX ORDER — AZACITIDINE 100 MG/1
75 INJECTION, POWDER, LYOPHILIZED, FOR SOLUTION INTRAVENOUS; SUBCUTANEOUS
Status: CANCELLED | OUTPATIENT
Start: 2019-07-18

## 2019-07-15 RX ORDER — AZACITIDINE 100 MG/1
75 INJECTION, POWDER, LYOPHILIZED, FOR SOLUTION INTRAVENOUS; SUBCUTANEOUS
Status: CANCELLED | OUTPATIENT
Start: 2019-07-16

## 2019-07-15 RX ORDER — AZACITIDINE 100 MG/1
75 INJECTION, POWDER, LYOPHILIZED, FOR SOLUTION INTRAVENOUS; SUBCUTANEOUS
Status: CANCELLED | OUTPATIENT
Start: 2019-07-17

## 2019-07-15 RX ORDER — FLUCONAZOLE 200 MG/1
400 TABLET ORAL DAILY
Qty: 30 TABLET | Refills: 0 | Status: SHIPPED | OUTPATIENT
Start: 2019-07-15 | End: 2019-08-21 | Stop reason: SDUPTHER

## 2019-07-15 RX ORDER — ONDANSETRON 2 MG/ML
8 INJECTION INTRAMUSCULAR; INTRAVENOUS
Status: CANCELLED
Start: 2019-07-15

## 2019-07-15 RX ORDER — ACYCLOVIR 400 MG/1
400 TABLET ORAL 2 TIMES DAILY
Qty: 50 TABLET | Refills: 0 | Status: SHIPPED | OUTPATIENT
Start: 2019-07-15 | End: 2019-09-09

## 2019-07-15 RX ORDER — AZACITIDINE 100 MG/1
75 INJECTION, POWDER, LYOPHILIZED, FOR SOLUTION INTRAVENOUS; SUBCUTANEOUS
Status: CANCELLED | OUTPATIENT
Start: 2019-07-19

## 2019-07-15 RX ORDER — ONDANSETRON 2 MG/ML
8 INJECTION INTRAMUSCULAR; INTRAVENOUS
Status: CANCELLED
Start: 2019-07-16

## 2019-07-15 RX ADMIN — AZACITIDINE 150 MG: 100 INJECTION, POWDER, LYOPHILIZED, FOR SOLUTION INTRAVENOUS; SUBCUTANEOUS at 12:07

## 2019-07-15 NOTE — Clinical Note
CBC and type and screen weeklyReturn visit with CBC, type and screen, CMP and appt with Dr. Valdes or NP on 8/12/19. Chemo chair for cycle 4 vidaza 8/12/19 - 8/16/16

## 2019-07-15 NOTE — NURSING
Pt here today for Vidaza injections. Received 3 in the abdomen. Tolerated well. No questions at this time.

## 2019-07-16 ENCOUNTER — INFUSION (OUTPATIENT)
Dept: INFUSION THERAPY | Facility: HOSPITAL | Age: 69
End: 2019-07-16
Attending: INTERNAL MEDICINE
Payer: MEDICARE

## 2019-07-16 DIAGNOSIS — D46.C MDS (MYELODYSPLASTIC SYNDROME) WITH 5Q DELETION: Primary | ICD-10-CM

## 2019-07-16 PROCEDURE — 63600175 PHARM REV CODE 636 W HCPCS: Mod: HCNC,JG | Performed by: INTERNAL MEDICINE

## 2019-07-16 PROCEDURE — 96401 CHEMO ANTI-NEOPL SQ/IM: CPT | Mod: HCNC

## 2019-07-16 RX ORDER — ONDANSETRON 2 MG/ML
8 INJECTION INTRAMUSCULAR; INTRAVENOUS
Status: DISCONTINUED | OUTPATIENT
Start: 2019-07-16 | End: 2019-07-16 | Stop reason: HOSPADM

## 2019-07-16 RX ORDER — AZACITIDINE 100 MG/1
75 INJECTION, POWDER, LYOPHILIZED, FOR SOLUTION INTRAVENOUS; SUBCUTANEOUS
Status: COMPLETED | OUTPATIENT
Start: 2019-07-16 | End: 2019-07-16

## 2019-07-16 RX ORDER — ONDANSETRON 2 MG/ML
8 INJECTION INTRAMUSCULAR; INTRAVENOUS
Status: DISCONTINUED | OUTPATIENT
Start: 2019-07-16 | End: 2019-07-16

## 2019-07-16 RX ADMIN — AZACITIDINE 150 MG: 100 INJECTION, POWDER, LYOPHILIZED, FOR SOLUTION INTRAVENOUS; SUBCUTANEOUS at 12:07

## 2019-07-17 ENCOUNTER — INFUSION (OUTPATIENT)
Dept: INFUSION THERAPY | Facility: HOSPITAL | Age: 69
End: 2019-07-17
Attending: INTERNAL MEDICINE
Payer: MEDICARE

## 2019-07-17 VITALS — DIASTOLIC BLOOD PRESSURE: 53 MMHG | RESPIRATION RATE: 18 BRPM | HEART RATE: 101 BPM | SYSTOLIC BLOOD PRESSURE: 125 MMHG

## 2019-07-17 DIAGNOSIS — D46.C MDS (MYELODYSPLASTIC SYNDROME) WITH 5Q DELETION: Primary | ICD-10-CM

## 2019-07-17 PROCEDURE — 63600175 PHARM REV CODE 636 W HCPCS: Mod: HCNC,JG | Performed by: INTERNAL MEDICINE

## 2019-07-17 PROCEDURE — 96401 CHEMO ANTI-NEOPL SQ/IM: CPT | Mod: HCNC

## 2019-07-17 RX ORDER — ONDANSETRON 2 MG/ML
8 INJECTION INTRAMUSCULAR; INTRAVENOUS
Status: DISCONTINUED | OUTPATIENT
Start: 2019-07-17 | End: 2019-07-17 | Stop reason: HOSPADM

## 2019-07-17 RX ORDER — AZACITIDINE 100 MG/1
75 INJECTION, POWDER, LYOPHILIZED, FOR SOLUTION INTRAVENOUS; SUBCUTANEOUS
Status: COMPLETED | OUTPATIENT
Start: 2019-07-17 | End: 2019-07-17

## 2019-07-17 RX ADMIN — AZACITIDINE 150 MG: 100 INJECTION, POWDER, LYOPHILIZED, FOR SOLUTION INTRAVENOUS; SUBCUTANEOUS at 10:07

## 2019-07-17 NOTE — NURSING
Pt here today for vidaza injections. VSS. Received via 3 injection in the abdomen. Tolerated well. No questions at this time.

## 2019-07-18 ENCOUNTER — INFUSION (OUTPATIENT)
Dept: INFUSION THERAPY | Facility: HOSPITAL | Age: 69
End: 2019-07-18
Attending: INTERNAL MEDICINE
Payer: MEDICARE

## 2019-07-18 VITALS — SYSTOLIC BLOOD PRESSURE: 121 MMHG | DIASTOLIC BLOOD PRESSURE: 60 MMHG | HEART RATE: 95 BPM

## 2019-07-18 DIAGNOSIS — D46.C MDS (MYELODYSPLASTIC SYNDROME) WITH 5Q DELETION: Primary | ICD-10-CM

## 2019-07-18 PROCEDURE — 63600175 PHARM REV CODE 636 W HCPCS: Mod: HCNC,JG | Performed by: INTERNAL MEDICINE

## 2019-07-18 PROCEDURE — 96401 CHEMO ANTI-NEOPL SQ/IM: CPT | Mod: HCNC

## 2019-07-18 RX ORDER — AZACITIDINE 100 MG/1
75 INJECTION, POWDER, LYOPHILIZED, FOR SOLUTION INTRAVENOUS; SUBCUTANEOUS
Status: COMPLETED | OUTPATIENT
Start: 2019-07-18 | End: 2019-07-18

## 2019-07-18 RX ADMIN — AZACITIDINE 150 MG: 100 INJECTION, POWDER, LYOPHILIZED, FOR SOLUTION INTRAVENOUS; SUBCUTANEOUS at 11:07

## 2019-07-18 NOTE — NURSING
Patient here for vidaza injections-given in 3 divided doses-complains of small amount of nausea-controlled with zofran.

## 2019-07-19 ENCOUNTER — INFUSION (OUTPATIENT)
Dept: INFUSION THERAPY | Facility: HOSPITAL | Age: 69
End: 2019-07-19
Attending: INTERNAL MEDICINE
Payer: MEDICARE

## 2019-07-19 VITALS
TEMPERATURE: 98 F | DIASTOLIC BLOOD PRESSURE: 65 MMHG | HEART RATE: 90 BPM | SYSTOLIC BLOOD PRESSURE: 129 MMHG | RESPIRATION RATE: 18 BRPM

## 2019-07-19 DIAGNOSIS — D46.C MDS (MYELODYSPLASTIC SYNDROME) WITH 5Q DELETION: Primary | ICD-10-CM

## 2019-07-19 PROCEDURE — 63600175 PHARM REV CODE 636 W HCPCS: Mod: JW,HCNC,JG | Performed by: INTERNAL MEDICINE

## 2019-07-19 PROCEDURE — 96401 CHEMO ANTI-NEOPL SQ/IM: CPT | Mod: HCNC

## 2019-07-19 RX ORDER — ONDANSETRON 2 MG/ML
8 INJECTION INTRAMUSCULAR; INTRAVENOUS ONCE
Status: DISCONTINUED | OUTPATIENT
Start: 2019-07-19 | End: 2019-07-19 | Stop reason: HOSPADM

## 2019-07-19 RX ORDER — AZACITIDINE 100 MG/1
75 INJECTION, POWDER, LYOPHILIZED, FOR SOLUTION INTRAVENOUS; SUBCUTANEOUS
Status: COMPLETED | OUTPATIENT
Start: 2019-07-19 | End: 2019-07-19

## 2019-07-19 RX ORDER — ONDANSETRON 2 MG/ML
8 INJECTION INTRAMUSCULAR; INTRAVENOUS
Status: DISCONTINUED | OUTPATIENT
Start: 2019-07-19 | End: 2019-07-19

## 2019-07-19 RX ADMIN — AZACITIDINE 150 MG: 100 INJECTION, POWDER, LYOPHILIZED, FOR SOLUTION INTRAVENOUS; SUBCUTANEOUS at 11:07

## 2019-07-19 NOTE — NURSING
Pt here today for day 5 of Vidaza. Received via 3 injections in the abdomen. Tolerated well. No questions at this time.

## 2019-07-22 ENCOUNTER — TELEPHONE (OUTPATIENT)
Dept: HEMATOLOGY/ONCOLOGY | Facility: CLINIC | Age: 69
End: 2019-07-22

## 2019-07-22 ENCOUNTER — PATIENT MESSAGE (OUTPATIENT)
Dept: HEMATOLOGY/ONCOLOGY | Facility: CLINIC | Age: 69
End: 2019-07-22

## 2019-07-22 DIAGNOSIS — D61.818 PANCYTOPENIA: Primary | ICD-10-CM

## 2019-07-22 RX ORDER — HYDROCODONE BITARTRATE AND ACETAMINOPHEN 500; 5 MG/1; MG/1
TABLET ORAL ONCE
Status: CANCELLED | OUTPATIENT
Start: 2019-07-22 | End: 2019-07-22

## 2019-07-22 RX ORDER — DIPHENHYDRAMINE HCL 25 MG
25 CAPSULE ORAL
Status: CANCELLED | OUTPATIENT
Start: 2019-07-22

## 2019-07-22 RX ORDER — ACETAMINOPHEN 325 MG/1
650 TABLET ORAL
Status: CANCELLED | OUTPATIENT
Start: 2019-07-22

## 2019-07-23 ENCOUNTER — INFUSION (OUTPATIENT)
Dept: INFUSION THERAPY | Facility: HOSPITAL | Age: 69
End: 2019-07-23
Attending: INTERNAL MEDICINE
Payer: MEDICARE

## 2019-07-23 VITALS
DIASTOLIC BLOOD PRESSURE: 70 MMHG | RESPIRATION RATE: 18 BRPM | TEMPERATURE: 98 F | SYSTOLIC BLOOD PRESSURE: 158 MMHG | HEART RATE: 85 BPM

## 2019-07-23 DIAGNOSIS — D61.818 PANCYTOPENIA: ICD-10-CM

## 2019-07-23 PROCEDURE — 25000003 PHARM REV CODE 250: Mod: HCNC | Performed by: NURSE PRACTITIONER

## 2019-07-23 PROCEDURE — 86920 COMPATIBILITY TEST SPIN: CPT | Mod: HCNC

## 2019-07-23 PROCEDURE — 36430 TRANSFUSION BLD/BLD COMPNT: CPT | Mod: HCNC

## 2019-07-23 PROCEDURE — P9040 RBC LEUKOREDUCED IRRADIATED: HCPCS | Mod: HCNC

## 2019-07-23 RX ORDER — HYDROCODONE BITARTRATE AND ACETAMINOPHEN 500; 5 MG/1; MG/1
TABLET ORAL ONCE
Status: COMPLETED | OUTPATIENT
Start: 2019-07-23 | End: 2019-07-23

## 2019-07-23 RX ORDER — DIPHENHYDRAMINE HCL 25 MG
25 CAPSULE ORAL
Status: COMPLETED | OUTPATIENT
Start: 2019-07-23 | End: 2019-07-23

## 2019-07-23 RX ORDER — ACETAMINOPHEN 325 MG/1
650 TABLET ORAL
Status: COMPLETED | OUTPATIENT
Start: 2019-07-23 | End: 2019-07-23

## 2019-07-23 RX ADMIN — SODIUM CHLORIDE: 9 INJECTION, SOLUTION INTRAVENOUS at 03:07

## 2019-07-23 RX ADMIN — DIPHENHYDRAMINE HYDROCHLORIDE 25 MG: 25 CAPSULE ORAL at 02:07

## 2019-07-23 RX ADMIN — ACETAMINOPHEN 650 MG: 325 TABLET ORAL at 02:07

## 2019-07-23 NOTE — PLAN OF CARE
Problem: Adult Inpatient Plan of Care  Goal: Plan of Care Review  Outcome: Ongoing (interventions implemented as appropriate)  Pt tolerated 1uPRBC without complications. VSS. No s/s of reaction. Instructed to contact MD with any questions. PIV removed and AVS given to patient.

## 2019-07-29 ENCOUNTER — TELEPHONE (OUTPATIENT)
Dept: HEMATOLOGY/ONCOLOGY | Facility: CLINIC | Age: 69
End: 2019-07-29

## 2019-07-29 ENCOUNTER — LAB VISIT (OUTPATIENT)
Dept: LAB | Facility: HOSPITAL | Age: 69
End: 2019-07-29
Attending: INTERNAL MEDICINE
Payer: MEDICARE

## 2019-07-29 DIAGNOSIS — D46.C MDS (MYELODYSPLASTIC SYNDROME) WITH 5Q DELETION: ICD-10-CM

## 2019-07-29 LAB
ABO + RH BLD: NORMAL
ANISOCYTOSIS BLD QL SMEAR: SLIGHT
BASOPHILS NFR BLD: 2 % (ref 0–1.9)
BLD GP AB SCN CELLS X3 SERPL QL: NORMAL
DIFFERENTIAL METHOD: ABNORMAL
EOSINOPHIL NFR BLD: 3 % (ref 0–8)
ERYTHROCYTE [DISTWIDTH] IN BLOOD BY AUTOMATED COUNT: 16.3 % (ref 11.5–14.5)
HCT VFR BLD AUTO: 22.7 % (ref 37–48.5)
HGB BLD-MCNC: 7.5 G/DL (ref 12–16)
HYPOCHROMIA BLD QL SMEAR: ABNORMAL
IMM GRANULOCYTES # BLD AUTO: ABNORMAL K/UL (ref 0–0.04)
IMM GRANULOCYTES NFR BLD AUTO: ABNORMAL % (ref 0–0.5)
LYMPHOCYTES NFR BLD: 52 % (ref 18–48)
MCH RBC QN AUTO: 31.6 PG (ref 27–31)
MCHC RBC AUTO-ENTMCNC: 33 G/DL (ref 32–36)
MCV RBC AUTO: 96 FL (ref 82–98)
MONOCYTES NFR BLD: 2 % (ref 4–15)
NEUTROPHILS NFR BLD: 41 % (ref 38–73)
NRBC BLD-RTO: 0 /100 WBC
OVALOCYTES BLD QL SMEAR: ABNORMAL
PLATELET # BLD AUTO: 45 K/UL (ref 150–350)
PLATELET BLD QL SMEAR: ABNORMAL
PMV BLD AUTO: 10.6 FL (ref 9.2–12.9)
POIKILOCYTOSIS BLD QL SMEAR: SLIGHT
POLYCHROMASIA BLD QL SMEAR: ABNORMAL
RBC # BLD AUTO: 2.37 M/UL (ref 4–5.4)
WBC # BLD AUTO: 1.76 K/UL (ref 3.9–12.7)

## 2019-07-29 PROCEDURE — 85027 COMPLETE CBC AUTOMATED: CPT | Mod: HCNC

## 2019-07-29 PROCEDURE — 36415 COLL VENOUS BLD VENIPUNCTURE: CPT | Mod: HCNC

## 2019-07-29 PROCEDURE — 85007 BL SMEAR W/DIFF WBC COUNT: CPT | Mod: HCNC

## 2019-07-29 PROCEDURE — 86901 BLOOD TYPING SEROLOGIC RH(D): CPT | Mod: HCNC

## 2019-07-30 ENCOUNTER — TELEPHONE (OUTPATIENT)
Dept: HEMATOLOGY/ONCOLOGY | Facility: CLINIC | Age: 69
End: 2019-07-30

## 2019-07-30 NOTE — TELEPHONE ENCOUNTER
Spoke to patient. Lab results given to patient.  Verbalized understanding    ----- Message from Alia Hendricks sent at 7/30/2019 11:01 AM CDT -----  Contact: Patient  Pt would like a call to discuss 07/29 lab results.      Contact:: [phone]

## 2019-08-02 DIAGNOSIS — K21.9 GASTROESOPHAGEAL REFLUX DISEASE WITHOUT ESOPHAGITIS: ICD-10-CM

## 2019-08-02 RX ORDER — PANTOPRAZOLE SODIUM 40 MG/1
TABLET, DELAYED RELEASE ORAL
Qty: 30 TABLET | Refills: 1 | Status: SHIPPED | OUTPATIENT
Start: 2019-08-02 | End: 2019-08-02 | Stop reason: SDUPTHER

## 2019-08-02 NOTE — TELEPHONE ENCOUNTER
----- Message from Meme Talley sent at 8/2/2019  2:27 PM CDT -----  Pt looking for pre-authorize for refill of her pantoprazole (PROTONIX) 40 MG tablet. [facility] will be calling shortly in regards to the same matter. Pt is available by phone at [phone]  Thank you

## 2019-08-02 NOTE — TELEPHONE ENCOUNTER
Informed patient nurse will complete the PA for protonix as soon as LUANN gets the information to us. She voiced understanding.

## 2019-08-05 ENCOUNTER — LAB VISIT (OUTPATIENT)
Dept: LAB | Facility: HOSPITAL | Age: 69
End: 2019-08-05
Attending: INTERNAL MEDICINE
Payer: MEDICARE

## 2019-08-05 ENCOUNTER — TELEPHONE (OUTPATIENT)
Dept: HEMATOLOGY/ONCOLOGY | Facility: CLINIC | Age: 69
End: 2019-08-05

## 2019-08-05 DIAGNOSIS — D46.C MDS (MYELODYSPLASTIC SYNDROME) WITH 5Q DELETION: ICD-10-CM

## 2019-08-05 LAB
ABO + RH BLD: NORMAL
ANISOCYTOSIS BLD QL SMEAR: SLIGHT
BASO STIPL BLD QL SMEAR: ABNORMAL
BASOPHILS NFR BLD: 0 % (ref 0–1.9)
BLD GP AB SCN CELLS X3 SERPL QL: NORMAL
DIFFERENTIAL METHOD: ABNORMAL
EOSINOPHIL NFR BLD: 2 % (ref 0–8)
ERYTHROCYTE [DISTWIDTH] IN BLOOD BY AUTOMATED COUNT: 17 % (ref 11.5–14.5)
HCT VFR BLD AUTO: 21.7 % (ref 37–48.5)
HGB BLD-MCNC: 7.1 G/DL (ref 12–16)
IMM GRANULOCYTES # BLD AUTO: ABNORMAL K/UL (ref 0–0.04)
IMM GRANULOCYTES NFR BLD AUTO: ABNORMAL % (ref 0–0.5)
LYMPHOCYTES NFR BLD: 80 % (ref 18–48)
MCH RBC QN AUTO: 31.7 PG (ref 27–31)
MCHC RBC AUTO-ENTMCNC: 32.7 G/DL (ref 32–36)
MCV RBC AUTO: 97 FL (ref 82–98)
MONOCYTES NFR BLD: 0 % (ref 4–15)
NEUTROPHILS NFR BLD: 17 % (ref 38–73)
NEUTS BAND NFR BLD MANUAL: 1 %
NRBC BLD-RTO: 0 /100 WBC
OVALOCYTES BLD QL SMEAR: ABNORMAL
PLATELET # BLD AUTO: 149 K/UL (ref 150–350)
PMV BLD AUTO: 11.3 FL (ref 9.2–12.9)
POIKILOCYTOSIS BLD QL SMEAR: SLIGHT
POLYCHROMASIA BLD QL SMEAR: ABNORMAL
RBC # BLD AUTO: 2.24 M/UL (ref 4–5.4)
WBC # BLD AUTO: 1.26 K/UL (ref 3.9–12.7)

## 2019-08-05 PROCEDURE — 85027 COMPLETE CBC AUTOMATED: CPT | Mod: HCNC

## 2019-08-05 PROCEDURE — 86850 RBC ANTIBODY SCREEN: CPT | Mod: HCNC

## 2019-08-05 PROCEDURE — 36415 COLL VENOUS BLD VENIPUNCTURE: CPT | Mod: HCNC

## 2019-08-05 PROCEDURE — 85007 BL SMEAR W/DIFF WBC COUNT: CPT | Mod: HCNC

## 2019-08-05 RX ORDER — PANTOPRAZOLE SODIUM 40 MG/1
40 TABLET, DELAYED RELEASE ORAL DAILY
Qty: 30 TABLET | Refills: 1 | Status: SHIPPED | OUTPATIENT
Start: 2019-08-05 | End: 2020-01-06 | Stop reason: SDUPTHER

## 2019-08-05 NOTE — TELEPHONE ENCOUNTER
Spoke to patient.  Reviewed lab results.      ----- Message from Barbara Chadwick sent at 8/5/2019  3:13 PM CDT -----  Contact: Pt   Patient returning phone call  Pt name MRN [id number] callback#[phone]  Ask who call was from  Nurse Stephanie   Unsure name.. ask provider name Dr Valdes  Attempt to  look up encounter in epic to see who initiated call  and IM name of person calling patient to notify pt on phone and returning call to allow for transfer of call to [facility] .  If unavailable - send message to box if know who initiated call  ? Or provider box if unsure .

## 2019-08-12 ENCOUNTER — OFFICE VISIT (OUTPATIENT)
Dept: HEMATOLOGY/ONCOLOGY | Facility: CLINIC | Age: 69
End: 2019-08-12
Payer: MEDICARE

## 2019-08-12 ENCOUNTER — INFUSION (OUTPATIENT)
Dept: INFUSION THERAPY | Facility: HOSPITAL | Age: 69
End: 2019-08-12
Attending: NURSE PRACTITIONER
Payer: MEDICARE

## 2019-08-12 ENCOUNTER — INFUSION (OUTPATIENT)
Dept: INFUSION THERAPY | Facility: HOSPITAL | Age: 69
End: 2019-08-12
Attending: INTERNAL MEDICINE
Payer: MEDICARE

## 2019-08-12 ENCOUNTER — TELEPHONE (OUTPATIENT)
Dept: HEMATOLOGY/ONCOLOGY | Facility: CLINIC | Age: 69
End: 2019-08-12

## 2019-08-12 VITALS
SYSTOLIC BLOOD PRESSURE: 131 MMHG | HEART RATE: 86 BPM | RESPIRATION RATE: 18 BRPM | TEMPERATURE: 98 F | DIASTOLIC BLOOD PRESSURE: 60 MMHG

## 2019-08-12 VITALS
WEIGHT: 188.06 LBS | SYSTOLIC BLOOD PRESSURE: 114 MMHG | OXYGEN SATURATION: 98 % | TEMPERATURE: 98 F | HEIGHT: 65 IN | HEART RATE: 82 BPM | DIASTOLIC BLOOD PRESSURE: 58 MMHG | BODY MASS INDEX: 31.33 KG/M2

## 2019-08-12 DIAGNOSIS — D46.C MDS (MYELODYSPLASTIC SYNDROME) WITH 5Q DELETION: Primary | ICD-10-CM

## 2019-08-12 DIAGNOSIS — N18.30 STAGE 3 CHRONIC KIDNEY DISEASE: ICD-10-CM

## 2019-08-12 DIAGNOSIS — D61.818 PANCYTOPENIA: ICD-10-CM

## 2019-08-12 DIAGNOSIS — D46.C MDS (MYELODYSPLASTIC SYNDROME) WITH 5Q DELETION: ICD-10-CM

## 2019-08-12 PROCEDURE — 25000003 PHARM REV CODE 250: Mod: HCNC | Performed by: NURSE PRACTITIONER

## 2019-08-12 PROCEDURE — 3074F SYST BP LT 130 MM HG: CPT | Mod: HCNC,CPTII,S$GLB, | Performed by: NURSE PRACTITIONER

## 2019-08-12 PROCEDURE — 99214 PR OFFICE/OUTPT VISIT, EST, LEVL IV, 30-39 MIN: ICD-10-PCS | Mod: HCNC,S$GLB,, | Performed by: NURSE PRACTITIONER

## 2019-08-12 PROCEDURE — 99999 PR PBB SHADOW E&M-EST. PATIENT-LVL V: ICD-10-PCS | Mod: PBBFAC,HCNC,, | Performed by: NURSE PRACTITIONER

## 2019-08-12 PROCEDURE — 3078F DIAST BP <80 MM HG: CPT | Mod: HCNC,CPTII,S$GLB, | Performed by: NURSE PRACTITIONER

## 2019-08-12 PROCEDURE — P9038 RBC IRRADIATED: HCPCS | Mod: HCNC

## 2019-08-12 PROCEDURE — 63600175 PHARM REV CODE 636 W HCPCS: Mod: HCNC,JG | Performed by: INTERNAL MEDICINE

## 2019-08-12 PROCEDURE — 1101F PT FALLS ASSESS-DOCD LE1/YR: CPT | Mod: HCNC,CPTII,S$GLB, | Performed by: NURSE PRACTITIONER

## 2019-08-12 PROCEDURE — 99214 OFFICE O/P EST MOD 30 MIN: CPT | Mod: HCNC,S$GLB,, | Performed by: NURSE PRACTITIONER

## 2019-08-12 PROCEDURE — 1101F PR PT FALLS ASSESS DOC 0-1 FALLS W/OUT INJ PAST YR: ICD-10-PCS | Mod: HCNC,CPTII,S$GLB, | Performed by: NURSE PRACTITIONER

## 2019-08-12 PROCEDURE — 36430 TRANSFUSION BLD/BLD COMPNT: CPT | Mod: HCNC

## 2019-08-12 PROCEDURE — 86920 COMPATIBILITY TEST SPIN: CPT | Mod: HCNC

## 2019-08-12 PROCEDURE — 27201040 HC RC 50 FILTER: Mod: HCNC

## 2019-08-12 PROCEDURE — 3074F PR MOST RECENT SYSTOLIC BLOOD PRESSURE < 130 MM HG: ICD-10-PCS | Mod: HCNC,CPTII,S$GLB, | Performed by: NURSE PRACTITIONER

## 2019-08-12 PROCEDURE — 3078F PR MOST RECENT DIASTOLIC BLOOD PRESSURE < 80 MM HG: ICD-10-PCS | Mod: HCNC,CPTII,S$GLB, | Performed by: NURSE PRACTITIONER

## 2019-08-12 PROCEDURE — 99999 PR PBB SHADOW E&M-EST. PATIENT-LVL V: CPT | Mod: PBBFAC,HCNC,, | Performed by: NURSE PRACTITIONER

## 2019-08-12 RX ORDER — ONDANSETRON 2 MG/ML
8 INJECTION INTRAMUSCULAR; INTRAVENOUS
Status: DISCONTINUED | OUTPATIENT
Start: 2019-08-12 | End: 2019-08-12 | Stop reason: HOSPADM

## 2019-08-12 RX ORDER — AZACITIDINE 100 MG/1
75 INJECTION, POWDER, LYOPHILIZED, FOR SOLUTION INTRAVENOUS; SUBCUTANEOUS
Status: CANCELLED | OUTPATIENT
Start: 2019-08-16

## 2019-08-12 RX ORDER — DIPHENHYDRAMINE HCL 25 MG
25 CAPSULE ORAL
Status: COMPLETED | OUTPATIENT
Start: 2019-08-12 | End: 2019-08-12

## 2019-08-12 RX ORDER — ONDANSETRON 2 MG/ML
8 INJECTION INTRAMUSCULAR; INTRAVENOUS
Status: DISCONTINUED | OUTPATIENT
Start: 2019-08-12 | End: 2019-08-12

## 2019-08-12 RX ORDER — AZACITIDINE 100 MG/1
75 INJECTION, POWDER, LYOPHILIZED, FOR SOLUTION INTRAVENOUS; SUBCUTANEOUS
Status: CANCELLED | OUTPATIENT
Start: 2019-08-13

## 2019-08-12 RX ORDER — AZACITIDINE 100 MG/1
75 INJECTION, POWDER, LYOPHILIZED, FOR SOLUTION INTRAVENOUS; SUBCUTANEOUS
Status: DISCONTINUED | OUTPATIENT
Start: 2019-08-12 | End: 2019-08-12 | Stop reason: HOSPADM

## 2019-08-12 RX ORDER — ACETAMINOPHEN 325 MG/1
650 TABLET ORAL
Status: CANCELLED | OUTPATIENT
Start: 2019-08-12

## 2019-08-12 RX ORDER — HYDROCODONE BITARTRATE AND ACETAMINOPHEN 500; 5 MG/1; MG/1
TABLET ORAL ONCE
Status: CANCELLED | OUTPATIENT
Start: 2019-08-12 | End: 2019-08-12

## 2019-08-12 RX ORDER — ONDANSETRON 2 MG/ML
8 INJECTION INTRAMUSCULAR; INTRAVENOUS
Status: CANCELLED
Start: 2019-08-15

## 2019-08-12 RX ORDER — ONDANSETRON 2 MG/ML
8 INJECTION INTRAMUSCULAR; INTRAVENOUS
Status: CANCELLED
Start: 2019-08-16

## 2019-08-12 RX ORDER — ONDANSETRON 2 MG/ML
8 INJECTION INTRAMUSCULAR; INTRAVENOUS
Status: CANCELLED
Start: 2019-08-12

## 2019-08-12 RX ORDER — AZACITIDINE 100 MG/1
75 INJECTION, POWDER, LYOPHILIZED, FOR SOLUTION INTRAVENOUS; SUBCUTANEOUS
Status: CANCELLED | OUTPATIENT
Start: 2019-08-12

## 2019-08-12 RX ORDER — AZACITIDINE 100 MG/1
75 INJECTION, POWDER, LYOPHILIZED, FOR SOLUTION INTRAVENOUS; SUBCUTANEOUS
Status: CANCELLED | OUTPATIENT
Start: 2019-08-14

## 2019-08-12 RX ORDER — ONDANSETRON 2 MG/ML
8 INJECTION INTRAMUSCULAR; INTRAVENOUS
Status: CANCELLED
Start: 2019-08-14

## 2019-08-12 RX ORDER — DIPHENHYDRAMINE HCL 25 MG
25 CAPSULE ORAL
Status: CANCELLED | OUTPATIENT
Start: 2019-08-12

## 2019-08-12 RX ORDER — ACETAMINOPHEN 325 MG/1
650 TABLET ORAL
Status: COMPLETED | OUTPATIENT
Start: 2019-08-12 | End: 2019-08-12

## 2019-08-12 RX ORDER — AZACITIDINE 100 MG/1
75 INJECTION, POWDER, LYOPHILIZED, FOR SOLUTION INTRAVENOUS; SUBCUTANEOUS
Status: CANCELLED | OUTPATIENT
Start: 2019-08-15

## 2019-08-12 RX ORDER — HYDROCODONE BITARTRATE AND ACETAMINOPHEN 500; 5 MG/1; MG/1
TABLET ORAL ONCE
Status: DISCONTINUED | OUTPATIENT
Start: 2019-08-12 | End: 2019-08-12 | Stop reason: HOSPADM

## 2019-08-12 RX ORDER — ONDANSETRON 2 MG/ML
8 INJECTION INTRAMUSCULAR; INTRAVENOUS
Status: CANCELLED
Start: 2019-08-13

## 2019-08-12 RX ADMIN — ACETAMINOPHEN 650 MG: 325 TABLET ORAL at 01:08

## 2019-08-12 RX ADMIN — DIPHENHYDRAMINE HYDROCHLORIDE 25 MG: 25 CAPSULE ORAL at 01:08

## 2019-08-12 RX ADMIN — AZACITIDINE 150 MG: 100 INJECTION, POWDER, LYOPHILIZED, FOR SOLUTION INTRAVENOUS; SUBCUTANEOUS at 02:08

## 2019-08-12 NOTE — Clinical Note
Please schedule weekly CBC, CMP, T&S and same labs with appt with Dr. Valdes on 9/9 and Adia 9/9-9/13

## 2019-08-12 NOTE — PLAN OF CARE
Problem: Adult Inpatient Plan of Care  Goal: Plan of Care Review  Tolerated tranfusion well, VS stable, AVS provided, discharged to home

## 2019-08-12 NOTE — PROGRESS NOTES
Subjective:       Patient ID: Susan Puente is a 68 y.o. female.    Chief Complaint: Follow-up (MDS)    HPI: Patient presents today for follow up of her history of MDS 5 q del syndrome prior to cycle 4 Vidaza as third line therapy for 5q minus syndrome. Will receive blood today for hgb 6.1 gm/dl, she has not had a PRBC transfusion in the past 3 weeks, Neutropenic today with . Denies fevers, chills, n/v/d/c, night sweats, sob and fatigue. Presents to clinic alone.     Oncology History   Ms. Puente is a 68 year old female with hx of DM2, peripheral vascular disease, tobacco use, CAD, hyperlipidemia, hypertension who was hospitalized 11/10/16 for anemia. hgb 5.3  MCH 43.4 with normal WBC and platelets. Patient had normal iron stores. She was transfused 3 units of PRBCs and discharged home with hgb 8.7 on 11/11/16. She was referred for further evalutation of her anemia. On 12/16/16 patient had a normal SPEP and immunofixation. Slightly elevated kappa light chains with normal ration. Elevated vitamin b12, normal folate, JAYDEN was positive with a low titer and negative profile. Patient had a bone marrow biopsy 1/5/16 which showed the core biopsy is normocellular for age (40%); however, megakaryocytes are increased and show frequent small, hypolobated forms. Additionally, a subset of the neutrophils are hypogranular. Blasts are not increased by either morphology (1.2%) or in the corresponding flow cytometric analysis. Fish detects a 5q deletion in 54.5% of nuclei. Cytogenetics reported 20 metaphases, 2 metaphases were normal and 18 metaphases had a 5q deletion. No additional cytogenetic abnormalities were detected. Findings consistent with 5q deletion syndrome.     Patient had a delay in obtaining Revlimid 10 mg daily but did start taking the medication 2/8/17. On 2/9/17 patient developed a diffuse maculopapular rash throughout scalp arms, legs and torso. She states she had no stridor or wheezing. She  discontinued medication 2/15/17. Went to allergist who provided references on desensitization so that patient could resume Revlimid. Unfortunately developed pancytopenia and Revlimid stopped 6/16/17. She had a repeat BMBX  performed 6/8/17 and showed a hypercellular marrow (60%) continued atypia in the granulocytes and megakaryocytes were noted.  Additionally, there is erythroid atypia present. No increase in blasts. Cytogenetics are normal and MDS FISH is negative, failing to show 5 q minus. NGS should no significant molecular mutations.  Anemia work up revealed bienvenido negative hemolysis.    Revlimid has been stopped since June 2017 after she developed pancytopenia while on Revlimid (required desensitization). CBC was normal for almost 1 year and marrow was negative for del5q. Bone marrow biopsy repeat from 6/2018 showed relapsed 5q minus MDS without new or additional mutations. Revlimid restarted at 2.5 mg daily with prednisone to prevent allergic reaction. Titrated to a goal of 10mg daily Revlimid. Hospital admission 3/12-3/17/19 for unresponsive event after blood transfusion, found to be profoundly pancytopenic at admission. Since hospital admission Revlimid has been stopped. Repeat marrow March 2019 shows persistent MDS with 5q minus.     Review of Systems   Constitutional: Negative for fever, chills, weight loss, fatigue.   HENT: Negative for sinus congestion or rhinorrhea. Negative for ear pain, mouth sores, nosebleeds and trouble swallowing.    Respiratory: Negative for cough, shortness of breath and wheezing.    Cardiovascular: Negative for chest pain and leg swelling.   Gastrointestinal: Negative for abdominal distention, abdominal pain, blood in stool, constipation, diarrhea, nausea and vomiting.   Endocrine: Negative for polyphagia and polyuria.   Genitourinary: Negative for dysuria, hematuria and urgency.   Musculoskeletal: Positive for arthralgias. Negative for myalgias.   Skin: Negative for color  change, pallor and rash.   Neurological: Negative for dizziness, weakness, light-headedness and headaches.   Hematological: Negative for adenopathy. Does not bruise/bleed easily.   Psychiatric/Behavioral: Negative for agitation and behavioral problems.       Objective:      Physical Exam   Constitutional: She is oriented to person, place, and time. She appears well-developed and well-nourished.   HENT:   Mouth/Throat: Oropharynx is clear and moist. No oropharyngeal exudate.   Eyes: Conjunctivae and EOM are normal. Pupils are equal, round, and reactive to light. Right eye exhibits no discharge. Left eye exhibits no discharge.   Neck: Normal range of motion. Neck supple.   Cardiovascular: Normal rate, regular rhythm, normal heart sounds and intact distal pulses.   No murmur heard.  Pulmonary/Chest: Effort normal and breath sounds normal. No respiratory distress. She has no wheezes.   Abdominal: Soft. Bowel sounds are normal. She exhibits no distension and no mass. There is no tenderness.   Musculoskeletal: Normal range of motion. She exhibits no edema.   Neurological: She is alert and oriented to person, place, and time.   Skin: Skin is warm and dry. No rash noted.   Psychiatric: She has a normal mood and affect. Her behavior is normal. Judgment and thought content normal.   Nursing note and vitals reviewed.      Assessment:       1. MDS (myelodysplastic syndrome) with 5q deletion    2. Pancytopenia    3. Stage 3 chronic kidney disease        Plan:     MDS  Initially with 5 q minus syndrome and treated with Revlimid. Developed allergy and was then desensitized. Soon after developed pancytopenia and Revlimid held since June 2017.  BMBX,6/8/17, had normal cytogenetics. Repeat marrow from 6/7/18 shows relapsed 5q minus. Discussed treatment options of HMA therapy or repeat trial of Revlimid and patient wished to proceed with Revlimid   Revlimid stopped again due to repeat allergic reaction and pancytopenia 3/2019  Started  cycle 1 Vidaza 5/6/19 subq for 5 days every 28 days  Tolerated cycles 1-3 well. Okay to proceed with C4.    Pancytopenia  Transfuse for hgb < 7 or plts < 10K  WBC 1.46; ; hgb 6.1; plt 214K  Transfuse 1 unit PRBC today  Continue ppx cipro 500 mg BID, acyclovir and fluconazole     Stage 3 CKD  - stable; continue to monitor with visits    DM2  - management per PCP; BG WNL today  - continue metformin    HTN  - management per PCP  - continue lisinopril-HCTZ    CAD  - continue praluent for HLD per PCP (intolerant to statins); continue ASA    Anxiety/Depression  Improved mood on Celexa. Continue.     Dehydration  - BUN 39; creat wnl  - continue to encourage increased oral fluid intake.    F/U  CBC and type and screen weekly for possible blood product transfusions  Return visit with CBC, type and screen, CMP and appt with Dr. Valdes or NP on 9/9/2019. Chemo chair for cycle 5 vidaza 9/9/19 - 9/13/19    KATIE Ko  Hematology/Oncology/Bone Marrow Transplant

## 2019-08-13 ENCOUNTER — INFUSION (OUTPATIENT)
Dept: INFUSION THERAPY | Facility: HOSPITAL | Age: 69
End: 2019-08-13
Attending: INTERNAL MEDICINE
Payer: MEDICARE

## 2019-08-13 VITALS — SYSTOLIC BLOOD PRESSURE: 117 MMHG | HEART RATE: 93 BPM | DIASTOLIC BLOOD PRESSURE: 57 MMHG

## 2019-08-13 DIAGNOSIS — D46.C MDS (MYELODYSPLASTIC SYNDROME) WITH 5Q DELETION: Primary | ICD-10-CM

## 2019-08-13 PROCEDURE — 96401 CHEMO ANTI-NEOPL SQ/IM: CPT | Mod: HCNC

## 2019-08-13 PROCEDURE — 63600175 PHARM REV CODE 636 W HCPCS: Mod: HCNC,JG | Performed by: INTERNAL MEDICINE

## 2019-08-13 RX ORDER — AZACITIDINE 100 MG/1
75 INJECTION, POWDER, LYOPHILIZED, FOR SOLUTION INTRAVENOUS; SUBCUTANEOUS
Status: COMPLETED | OUTPATIENT
Start: 2019-08-13 | End: 2019-08-13

## 2019-08-13 RX ADMIN — AZACITIDINE 150 MG: 100 INJECTION, POWDER, LYOPHILIZED, FOR SOLUTION INTRAVENOUS; SUBCUTANEOUS at 12:08

## 2019-08-13 NOTE — NURSING
Patient here for vidaza injections-given in 3 divided doses-no nausea or diarrhea-states taking zofran with good results-tolerated well.

## 2019-08-14 ENCOUNTER — INFUSION (OUTPATIENT)
Dept: INFUSION THERAPY | Facility: HOSPITAL | Age: 69
End: 2019-08-14
Attending: INTERNAL MEDICINE
Payer: MEDICARE

## 2019-08-14 ENCOUNTER — TELEPHONE (OUTPATIENT)
Dept: CARDIOLOGY | Facility: CLINIC | Age: 69
End: 2019-08-14

## 2019-08-14 VITALS — SYSTOLIC BLOOD PRESSURE: 125 MMHG | HEART RATE: 92 BPM | DIASTOLIC BLOOD PRESSURE: 55 MMHG

## 2019-08-14 DIAGNOSIS — D46.C MDS (MYELODYSPLASTIC SYNDROME) WITH 5Q DELETION: Primary | ICD-10-CM

## 2019-08-14 PROCEDURE — 96401 CHEMO ANTI-NEOPL SQ/IM: CPT | Mod: HCNC

## 2019-08-14 PROCEDURE — 63600175 PHARM REV CODE 636 W HCPCS: Mod: HCNC,JG | Performed by: INTERNAL MEDICINE

## 2019-08-14 RX ORDER — AZACITIDINE 100 MG/1
75 INJECTION, POWDER, LYOPHILIZED, FOR SOLUTION INTRAVENOUS; SUBCUTANEOUS
Status: COMPLETED | OUTPATIENT
Start: 2019-08-14 | End: 2019-08-14

## 2019-08-14 RX ADMIN — AZACITIDINE 150 MG: 100 INJECTION, POWDER, LYOPHILIZED, FOR SOLUTION INTRAVENOUS; SUBCUTANEOUS at 12:08

## 2019-08-14 NOTE — TELEPHONE ENCOUNTER
----- Message from Alina Bazan MD sent at 8/14/2019  1:09 PM CDT -----  Cholesterol level is normal. No changes in medications.

## 2019-08-15 ENCOUNTER — INFUSION (OUTPATIENT)
Dept: INFUSION THERAPY | Facility: HOSPITAL | Age: 69
End: 2019-08-15
Attending: INTERNAL MEDICINE
Payer: MEDICARE

## 2019-08-15 VITALS
RESPIRATION RATE: 18 BRPM | HEART RATE: 89 BPM | DIASTOLIC BLOOD PRESSURE: 58 MMHG | SYSTOLIC BLOOD PRESSURE: 125 MMHG | TEMPERATURE: 99 F

## 2019-08-15 DIAGNOSIS — D46.C MDS (MYELODYSPLASTIC SYNDROME) WITH 5Q DELETION: Primary | ICD-10-CM

## 2019-08-15 PROCEDURE — 63600175 PHARM REV CODE 636 W HCPCS: Mod: JG,HCNC | Performed by: INTERNAL MEDICINE

## 2019-08-15 PROCEDURE — 96401 CHEMO ANTI-NEOPL SQ/IM: CPT | Mod: HCNC

## 2019-08-15 RX ORDER — ONDANSETRON 2 MG/ML
8 INJECTION INTRAMUSCULAR; INTRAVENOUS ONCE
Status: DISCONTINUED | OUTPATIENT
Start: 2019-08-15 | End: 2019-08-15 | Stop reason: HOSPADM

## 2019-08-15 RX ORDER — AZACITIDINE 100 MG/1
75 INJECTION, POWDER, LYOPHILIZED, FOR SOLUTION INTRAVENOUS; SUBCUTANEOUS
Status: COMPLETED | OUTPATIENT
Start: 2019-08-15 | End: 2019-08-15

## 2019-08-15 RX ORDER — ONDANSETRON 2 MG/ML
8 INJECTION INTRAMUSCULAR; INTRAVENOUS
Status: DISCONTINUED | OUTPATIENT
Start: 2019-08-15 | End: 2019-08-15

## 2019-08-15 RX ADMIN — AZACITIDINE 150 MG: 100 INJECTION, POWDER, LYOPHILIZED, FOR SOLUTION INTRAVENOUS; SUBCUTANEOUS at 12:08

## 2019-08-16 ENCOUNTER — INFUSION (OUTPATIENT)
Dept: INFUSION THERAPY | Facility: HOSPITAL | Age: 69
End: 2019-08-16
Attending: INTERNAL MEDICINE
Payer: MEDICARE

## 2019-08-16 VITALS
RESPIRATION RATE: 18 BRPM | TEMPERATURE: 98 F | DIASTOLIC BLOOD PRESSURE: 60 MMHG | SYSTOLIC BLOOD PRESSURE: 138 MMHG | HEART RATE: 87 BPM

## 2019-08-16 DIAGNOSIS — D46.C MDS (MYELODYSPLASTIC SYNDROME) WITH 5Q DELETION: Primary | ICD-10-CM

## 2019-08-16 PROCEDURE — 96401 CHEMO ANTI-NEOPL SQ/IM: CPT | Mod: HCNC

## 2019-08-16 PROCEDURE — 63600175 PHARM REV CODE 636 W HCPCS: Mod: JG,HCNC | Performed by: INTERNAL MEDICINE

## 2019-08-16 RX ORDER — AZACITIDINE 100 MG/1
75 INJECTION, POWDER, LYOPHILIZED, FOR SOLUTION INTRAVENOUS; SUBCUTANEOUS
Status: COMPLETED | OUTPATIENT
Start: 2019-08-16 | End: 2019-08-16

## 2019-08-16 RX ORDER — LIDOCAINE HYDROCHLORIDE 20 MG/ML
SOLUTION ORAL; TOPICAL DAILY PRN
COMMUNITY
Start: 2019-06-20 | End: 2020-09-14

## 2019-08-16 RX ORDER — ONDANSETRON 2 MG/ML
8 INJECTION INTRAMUSCULAR; INTRAVENOUS
Status: DISCONTINUED | OUTPATIENT
Start: 2019-08-16 | End: 2019-08-16 | Stop reason: HOSPADM

## 2019-08-16 RX ADMIN — AZACITIDINE 150 MG: 100 INJECTION, POWDER, LYOPHILIZED, FOR SOLUTION INTRAVENOUS; SUBCUTANEOUS at 12:08

## 2019-08-16 NOTE — NURSING
Pt arrived for vidaza D5.  Pt  Tolerates injections SQ x3 to rLA.  Discharged to home using her walker with AVS and Appt calendar.

## 2019-08-19 ENCOUNTER — INFUSION (OUTPATIENT)
Dept: INFUSION THERAPY | Facility: HOSPITAL | Age: 69
End: 2019-08-19
Attending: INTERNAL MEDICINE
Payer: MEDICARE

## 2019-08-19 ENCOUNTER — TELEPHONE (OUTPATIENT)
Dept: HEMATOLOGY/ONCOLOGY | Facility: CLINIC | Age: 69
End: 2019-08-19

## 2019-08-19 ENCOUNTER — LAB VISIT (OUTPATIENT)
Dept: LAB | Facility: HOSPITAL | Age: 69
End: 2019-08-19
Attending: INTERNAL MEDICINE
Payer: MEDICARE

## 2019-08-19 VITALS
BODY MASS INDEX: 31.32 KG/M2 | WEIGHT: 188 LBS | TEMPERATURE: 99 F | SYSTOLIC BLOOD PRESSURE: 132 MMHG | HEART RATE: 98 BPM | HEIGHT: 65 IN | DIASTOLIC BLOOD PRESSURE: 80 MMHG | RESPIRATION RATE: 17 BRPM

## 2019-08-19 DIAGNOSIS — D63.0 ANEMIA IN NEOPLASTIC DISEASE: Primary | ICD-10-CM

## 2019-08-19 DIAGNOSIS — N17.9 AKI (ACUTE KIDNEY INJURY): ICD-10-CM

## 2019-08-19 DIAGNOSIS — D63.0 ANEMIA IN NEOPLASTIC DISEASE: ICD-10-CM

## 2019-08-19 DIAGNOSIS — D46.C MDS (MYELODYSPLASTIC SYNDROME) WITH 5Q DELETION: ICD-10-CM

## 2019-08-19 LAB
ABO + RH BLD: NORMAL
ALBUMIN SERPL BCP-MCNC: 3.8 G/DL (ref 3.5–5.2)
ALP SERPL-CCNC: 65 U/L (ref 55–135)
ALT SERPL W/O P-5'-P-CCNC: 42 U/L (ref 10–44)
ANION GAP SERPL CALC-SCNC: 12 MMOL/L (ref 8–16)
ANISOCYTOSIS BLD QL SMEAR: SLIGHT
AST SERPL-CCNC: 23 U/L (ref 10–40)
BASOPHILS # BLD AUTO: 0.01 K/UL (ref 0–0.2)
BASOPHILS NFR BLD: 0.5 % (ref 0–1.9)
BILIRUB SERPL-MCNC: 0.4 MG/DL (ref 0.1–1)
BLD GP AB SCN CELLS X3 SERPL QL: NORMAL
BLD PROD TYP BPU: NORMAL
BLOOD UNIT EXPIRATION DATE: NORMAL
BLOOD UNIT TYPE CODE: 6200
BLOOD UNIT TYPE: NORMAL
BUN SERPL-MCNC: 40 MG/DL (ref 8–23)
CALCIUM SERPL-MCNC: 10 MG/DL (ref 8.7–10.5)
CHLORIDE SERPL-SCNC: 107 MMOL/L (ref 95–110)
CO2 SERPL-SCNC: 24 MMOL/L (ref 23–29)
CODING SYSTEM: NORMAL
CREAT SERPL-MCNC: 1.7 MG/DL (ref 0.5–1.4)
DIFFERENTIAL METHOD: ABNORMAL
DISPENSE STATUS: NORMAL
EOSINOPHIL # BLD AUTO: 0.1 K/UL (ref 0–0.5)
EOSINOPHIL NFR BLD: 2.4 % (ref 0–8)
ERYTHROCYTE [DISTWIDTH] IN BLOOD BY AUTOMATED COUNT: 18.3 % (ref 11.5–14.5)
EST. GFR  (AFRICAN AMERICAN): 35 ML/MIN/1.73 M^2
EST. GFR  (NON AFRICAN AMERICAN): 30.3 ML/MIN/1.73 M^2
GLUCOSE SERPL-MCNC: 123 MG/DL (ref 70–110)
HCT VFR BLD AUTO: 19.9 % (ref 37–48.5)
HGB BLD-MCNC: 6.3 G/DL (ref 12–16)
HYPOCHROMIA BLD QL SMEAR: ABNORMAL
IMM GRANULOCYTES # BLD AUTO: 0.04 K/UL (ref 0–0.04)
IMM GRANULOCYTES NFR BLD AUTO: 1.9 % (ref 0–0.5)
LYMPHOCYTES # BLD AUTO: 1.1 K/UL (ref 1–4.8)
LYMPHOCYTES NFR BLD: 51.9 % (ref 18–48)
MCH RBC QN AUTO: 31.7 PG (ref 27–31)
MCHC RBC AUTO-ENTMCNC: 31.7 G/DL (ref 32–36)
MCV RBC AUTO: 100 FL (ref 82–98)
MONOCYTES # BLD AUTO: 0.1 K/UL (ref 0.3–1)
MONOCYTES NFR BLD: 4.8 % (ref 4–15)
NEUTROPHILS # BLD AUTO: 0.8 K/UL (ref 1.8–7.7)
NEUTROPHILS NFR BLD: 38.5 % (ref 38–73)
NRBC BLD-RTO: 0 /100 WBC
NUM UNITS TRANS PACKED RBC: NORMAL
OVALOCYTES BLD QL SMEAR: ABNORMAL
PLATELET # BLD AUTO: 137 K/UL (ref 150–350)
PLATELET BLD QL SMEAR: ABNORMAL
PMV BLD AUTO: 11.3 FL (ref 9.2–12.9)
POIKILOCYTOSIS BLD QL SMEAR: SLIGHT
POLYCHROMASIA BLD QL SMEAR: ABNORMAL
POTASSIUM SERPL-SCNC: 4.8 MMOL/L (ref 3.5–5.1)
PROT SERPL-MCNC: 6.9 G/DL (ref 6–8.4)
RBC # BLD AUTO: 1.99 M/UL (ref 4–5.4)
SODIUM SERPL-SCNC: 143 MMOL/L (ref 136–145)
WBC # BLD AUTO: 2.1 K/UL (ref 3.9–12.7)

## 2019-08-19 PROCEDURE — P9038 RBC IRRADIATED: HCPCS | Mod: HCNC

## 2019-08-19 PROCEDURE — 86920 COMPATIBILITY TEST SPIN: CPT | Mod: HCNC

## 2019-08-19 PROCEDURE — 36430 TRANSFUSION BLD/BLD COMPNT: CPT | Mod: HCNC

## 2019-08-19 PROCEDURE — 86850 RBC ANTIBODY SCREEN: CPT | Mod: HCNC

## 2019-08-19 PROCEDURE — 27201040 HC RC 50 FILTER: Mod: HCNC

## 2019-08-19 PROCEDURE — 36415 COLL VENOUS BLD VENIPUNCTURE: CPT | Mod: HCNC

## 2019-08-19 PROCEDURE — 85025 COMPLETE CBC W/AUTO DIFF WBC: CPT | Mod: HCNC

## 2019-08-19 PROCEDURE — 80053 COMPREHEN METABOLIC PANEL: CPT | Mod: HCNC

## 2019-08-19 RX ORDER — SODIUM CHLORIDE 0.9 % (FLUSH) 0.9 %
10 SYRINGE (ML) INJECTION
Status: CANCELLED | OUTPATIENT
Start: 2019-08-19

## 2019-08-19 RX ORDER — HEPARIN 100 UNIT/ML
500 SYRINGE INTRAVENOUS
Status: CANCELLED | OUTPATIENT
Start: 2019-08-19

## 2019-08-19 RX ORDER — HYDROCODONE BITARTRATE AND ACETAMINOPHEN 500; 5 MG/1; MG/1
TABLET ORAL ONCE
Status: CANCELLED | OUTPATIENT
Start: 2019-08-19 | End: 2019-08-19

## 2019-08-19 RX ORDER — HYDROCODONE BITARTRATE AND ACETAMINOPHEN 500; 5 MG/1; MG/1
TABLET ORAL ONCE
Status: DISCONTINUED | OUTPATIENT
Start: 2019-08-19 | End: 2019-08-19 | Stop reason: HOSPADM

## 2019-08-19 NOTE — PLAN OF CARE
Problem: Adult Inpatient Plan of Care  Goal: Plan of Care Review  Outcome: Ongoing (interventions implemented as appropriate)  Pt tolerated 1 unit PRBC without issue, pt has no upcoming appts scheduled at this time, no distress noted upon d/c to home

## 2019-08-19 NOTE — PLAN OF CARE
Problem: Anemia  Goal: Anemia Symptom Improvement  Outcome: Ongoing (interventions implemented as appropriate)  Pt here for 1 unit PRBC, labs, hx, meds, allergies reviewed, pt with no complaints at this time, just feel weak and tired, assisted to recliner, continue to monitor

## 2019-08-21 ENCOUNTER — OFFICE VISIT (OUTPATIENT)
Dept: CARDIOLOGY | Facility: CLINIC | Age: 69
End: 2019-08-21
Payer: MEDICARE

## 2019-08-21 VITALS
HEART RATE: 94 BPM | SYSTOLIC BLOOD PRESSURE: 112 MMHG | DIASTOLIC BLOOD PRESSURE: 57 MMHG | WEIGHT: 189.81 LBS | HEIGHT: 65 IN | BODY MASS INDEX: 31.63 KG/M2 | OXYGEN SATURATION: 97 %

## 2019-08-21 DIAGNOSIS — D61.818 PANCYTOPENIA: ICD-10-CM

## 2019-08-21 DIAGNOSIS — D46.9 MYELODYSPLASTIC SYNDROME: ICD-10-CM

## 2019-08-21 DIAGNOSIS — N18.30 CONTROLLED TYPE 2 DIABETES MELLITUS WITH STAGE 3 CHRONIC KIDNEY DISEASE, WITHOUT LONG-TERM CURRENT USE OF INSULIN: ICD-10-CM

## 2019-08-21 DIAGNOSIS — E78.00 HYPERCHOLESTEREMIA: Primary | ICD-10-CM

## 2019-08-21 DIAGNOSIS — Z95.820 S/P ANGIOPLASTY WITH STENT: ICD-10-CM

## 2019-08-21 DIAGNOSIS — N18.30 STAGE 3 CHRONIC KIDNEY DISEASE: ICD-10-CM

## 2019-08-21 DIAGNOSIS — E11.22 CONTROLLED TYPE 2 DIABETES MELLITUS WITH STAGE 3 CHRONIC KIDNEY DISEASE, WITHOUT LONG-TERM CURRENT USE OF INSULIN: ICD-10-CM

## 2019-08-21 DIAGNOSIS — I25.10 CORONARY ARTERY DISEASE, ANGINA PRESENCE UNSPECIFIED, UNSPECIFIED VESSEL OR LESION TYPE, UNSPECIFIED WHETHER NATIVE OR TRANSPLANTED HEART: ICD-10-CM

## 2019-08-21 DIAGNOSIS — Z98.890 HISTORY OF CEA (CAROTID ENDARTERECTOMY): ICD-10-CM

## 2019-08-21 DIAGNOSIS — I65.23 BILATERAL CAROTID ARTERY STENOSIS: ICD-10-CM

## 2019-08-21 DIAGNOSIS — Z98.61 CAD S/P PERCUTANEOUS CORONARY ANGIOPLASTY: ICD-10-CM

## 2019-08-21 DIAGNOSIS — I10 ESSENTIAL HYPERTENSION: ICD-10-CM

## 2019-08-21 DIAGNOSIS — I25.10 CAD S/P PERCUTANEOUS CORONARY ANGIOPLASTY: ICD-10-CM

## 2019-08-21 PROCEDURE — 3074F SYST BP LT 130 MM HG: CPT | Mod: HCNC,CPTII,S$GLB, | Performed by: INTERNAL MEDICINE

## 2019-08-21 PROCEDURE — 1101F PT FALLS ASSESS-DOCD LE1/YR: CPT | Mod: HCNC,CPTII,S$GLB, | Performed by: INTERNAL MEDICINE

## 2019-08-21 PROCEDURE — 99999 PR PBB SHADOW E&M-EST. PATIENT-LVL III: ICD-10-PCS | Mod: PBBFAC,HCNC,, | Performed by: INTERNAL MEDICINE

## 2019-08-21 PROCEDURE — 1101F PR PT FALLS ASSESS DOC 0-1 FALLS W/OUT INJ PAST YR: ICD-10-PCS | Mod: HCNC,CPTII,S$GLB, | Performed by: INTERNAL MEDICINE

## 2019-08-21 PROCEDURE — 99999 PR PBB SHADOW E&M-EST. PATIENT-LVL III: CPT | Mod: PBBFAC,HCNC,, | Performed by: INTERNAL MEDICINE

## 2019-08-21 PROCEDURE — 99214 OFFICE O/P EST MOD 30 MIN: CPT | Mod: HCNC,S$GLB,, | Performed by: INTERNAL MEDICINE

## 2019-08-21 PROCEDURE — 3078F DIAST BP <80 MM HG: CPT | Mod: HCNC,CPTII,S$GLB, | Performed by: INTERNAL MEDICINE

## 2019-08-21 PROCEDURE — 99214 PR OFFICE/OUTPT VISIT, EST, LEVL IV, 30-39 MIN: ICD-10-PCS | Mod: HCNC,S$GLB,, | Performed by: INTERNAL MEDICINE

## 2019-08-21 PROCEDURE — 3078F PR MOST RECENT DIASTOLIC BLOOD PRESSURE < 80 MM HG: ICD-10-PCS | Mod: HCNC,CPTII,S$GLB, | Performed by: INTERNAL MEDICINE

## 2019-08-21 PROCEDURE — 3074F PR MOST RECENT SYSTOLIC BLOOD PRESSURE < 130 MM HG: ICD-10-PCS | Mod: HCNC,CPTII,S$GLB, | Performed by: INTERNAL MEDICINE

## 2019-08-21 RX ORDER — FLUCONAZOLE 200 MG/1
400 TABLET ORAL DAILY
Qty: 30 TABLET | Refills: 0 | Status: SHIPPED | OUTPATIENT
Start: 2019-08-21 | End: 2019-09-09 | Stop reason: SDUPTHER

## 2019-08-21 NOTE — PROGRESS NOTES
Subjective:   Patient ID:  Susan Puente is a 69 y.o. female who presents for follow-up of Coronary artery disease, angina presence unspecified, unspec    Susan Puente is a 68 y.o. female who presents for follow-up of MDS (myelodysplastic syndrome) and Shortness of Breath     2005-PCI in [facility] (3 stents in RCA [facility], Dr ELIZABET Byers)  ?ASx at that time-->Elevated BP-->? Tightness in neck/shoulders-->Stress test-->LHC  HTN  HL  Non-insulin requiring DM (on metformin)  2011-L-CEA @ [facility]  Current smoker (quit 2 in the past, smoking since the age of 19)   (+)premature coronary disease @ 46 paternally     Carotid US 4/16  There is 40 - 49% right Internal Carotid stenosis.  There is 40 - 49% left Internal Carotid stenosis.    Carotid US 1/19  · There is 50-59% right Internal Carotid Stenosis.  · There is 50-59% left Internal Carotid Stenosis.  · Significant bilat CCA plaques noted     NM stress test 2016  Impression: NORMAL MYOCARDIAL PERFUSION  1. The perfusion scan is free of evidence for myocardial ischemia or injury.   2. Resting wall motion is physiologic.   3. Visually estimated LV function is normal.   4. The ventricular volumes are normal at rest and stress.   5. The extracardiac distribution of radioactivity is normal.   6. There was no previous study available to compare.      HPI:   Patient does not exercise.   Patient says that she absolutely cannot tolerate MTP, it is causing insomnia, nervousness and has has has her BP High she does not want to take it for now.   Recent diagnosis of MDS on Renlivid. Patient had an allergic reaction- de sensitization was done. Patient went into remission and then had a relapse. 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    No chest pain, Orthopnea, PND of heart failure symptoms.   Patient get SOB going up the stairs- thinks that this is related to anemia.  Occasional pounding in the chest with exertion.   Patient quit smoking.   HPI:   No chest pain, Orthopnea,  "PND of heart failure symptoms.   Patient gets SOB on minimal exertion.   Patient is severely anemic from [facility].   Patient had recent hospital discharge as she was unresponsive and while in ER coded and was told that she had  It was due to severe anemia.     Patient Active Problem List   Diagnosis    Controlled type 2 diabetes mellitus with stage 3 chronic kidney disease, without long-term current use of insulin    CAD (coronary artery disease)    Bilateral carotid artery disease    Hearing loss in right ear    Anemia requiring transfusions    Macrocytic anemia    Hypercalcemia    Essential hypertension    Calcification of aorta    Stage 3 chronic kidney disease    Myelodysplastic syndrome    MDS (myelodysplastic syndrome) with 5q deletion    Adjustment disorder with mixed anxiety and depressed mood    MDS (myelodysplastic syndrome)    Pancytopenia    Acute hypoxemic respiratory failure    Altered level of consciousness    Hypomagnesemia    Dehydration    NOEL (acute kidney injury)     BP (!) 112/57 (BP Location: Left arm, Patient Position: Sitting, BP Method: X-Large (Automatic))   Pulse 94   Ht 5' 5" (1.651 m)   Wt 86.1 kg (189 lb 13.1 oz)   SpO2 97%   BMI 31.59 kg/m²   Body mass index is 31.59 kg/m².  Estimated Creatinine Clearance: 33.8 mL/min (A) (based on SCr of 1.7 mg/dL (H)).    Lab Results   Component Value Date     08/19/2019    K 4.8 08/19/2019     08/19/2019    CO2 24 08/19/2019    BUN 40 (H) 08/19/2019    CREATININE 1.7 (H) 08/19/2019     (H) 08/19/2019    HGBA1C 5.3 12/28/2017    MG 1.7 03/21/2019    AST 23 08/19/2019    ALT 42 08/19/2019    ALBUMIN 3.8 08/19/2019    PROT 6.9 08/19/2019    BILITOT 0.4 08/19/2019    WBC 2.10 (L) 08/19/2019    HGB 6.3 (L) 08/19/2019    HCT 19.9 (LL) 08/19/2019     (H) 08/19/2019     (L) 08/19/2019    INR 1.1 03/12/2019    TSH 1.159 03/12/2019    CHOL 107 (L) 08/14/2019    HDL 41 08/14/2019    LDLCALC 35.2 (L) " 08/14/2019    TRIG 154 (H) 08/14/2019       Current Outpatient Medications   Medication Sig    acetaminophen (TYLENOL ARTHRITIS PAIN) 650 MG TbSR Take 650 mg by mouth every 8 (eight) hours as needed (for pain).    acyclovir (ZOVIRAX) 400 MG tablet Take 1 tablet (400 mg total) by mouth 2 (two) times daily. for 10 days    alirocumab (PRALUENT PEN) 75 mg/mL PnIj Inject 1 mL (75 mg total) into the skin every 14 (fourteen) days.    aspirin (ECOTRIN) 81 MG EC tablet Take 81 mg by mouth once daily.    b complex vitamins capsule Take 1 capsule by mouth once daily.    blood sugar diagnostic Strp 1 each by Misc.(Non-Drug; Combo Route) route once daily.    blood-glucose meter kit Use as instructed    citalopram (CELEXA) 20 MG tablet Take 1 tablet (20 mg total) by mouth once daily.    fexofenadine 30 mg TbDL Take by mouth once daily.     fish oil-omega-3 fatty acids 300-1,000 mg capsule Take 4 g by mouth nightly.     fluconazole (DIFLUCAN) 200 MG Tab Take 2 tablets (400 mg total) by mouth once daily.    lancets (FREESTYLE LANCETS) 28 gauge Misc 1 lancet by Misc.(Non-Drug; Combo Route) route once daily.    LIDOCAINE VISCOUS 2 % solution daily as needed.     magic mouthwash diphen/antac/lidoc Swish and spit 15 mls every 4 (four)  hours as needed.    magnesium 30 mg Tab Take by mouth once.    metFORMIN (GLUCOPHAGE-XR) 500 MG 24 hr tablet Take 1 tablet (500 mg total) by mouth daily with breakfast.    ondansetron (ZOFRAN-ODT) 8 MG TbDL Dissolve 1 tablet by mouth every 6 hours    pantoprazole (PROTONIX) 40 MG tablet Take 1 tablet (40 mg total) by mouth once daily.    potassium 99 mg Tab Take by mouth once.    TRUE METRIX GLUCOSE METER Misc     walker Misc 1 each by Misc.(Non-Drug; Combo Route) route once daily at 6am.     No current facility-administered medications for this visit.      Facility-Administered Medications Ordered in Other Visits   Medication    0.9%  NaCl infusion (for blood administration)     0.9%  NaCl infusion (for blood administration)       Review of Systems   Constitution: Negative for chills, decreased appetite, malaise/fatigue, night sweats, weight gain and weight loss.   Eyes: Negative for blurred vision, double vision, visual disturbance and visual halos.   Cardiovascular: Positive for dyspnea on exertion. Negative for chest pain, claudication, cyanosis, irregular heartbeat, leg swelling, near-syncope, orthopnea, palpitations, paroxysmal nocturnal dyspnea and syncope.   Respiratory: Negative for cough, hemoptysis, snoring, sputum production and wheezing.    Endocrine: Negative for cold intolerance, heat intolerance, polydipsia and polyphagia.   Hematologic/Lymphatic: Negative for adenopathy and bleeding problem. Does not bruise/bleed easily.   Skin: Negative for flushing, itching, poor wound healing and rash.   Musculoskeletal: Positive for arthritis. Negative for back pain, falls, gout, joint pain, joint swelling, muscle cramps, muscle weakness, myalgias, neck pain and stiffness.   Gastrointestinal: Negative for bloating, abdominal pain, anorexia, diarrhea, dysphagia, excessive appetite, flatus, hematemesis, jaundice, melena and nausea.   Genitourinary: Negative for hesitancy and incomplete emptying.   Neurological: Negative for aphonia, brief paralysis, difficulty with concentration, disturbances in coordination, excessive daytime sleepiness, dizziness, focal weakness, light-headedness, loss of balance and weakness.   Psychiatric/Behavioral: Negative for altered mental status, depression, hallucinations, hypervigilance, memory loss, substance abuse and suicidal ideas. The patient does not have insomnia and is not nervous/anxious.        Objective:   Physical Exam   Constitutional: She is oriented to person, place, and time. She appears well-developed and well-nourished. No distress.   HENT:   Head: Normocephalic and atraumatic.   Nose: Nose normal.   Mouth/Throat: Oropharynx is clear and  moist. No oropharyngeal exudate.   Eyes: Pupils are equal, round, and reactive to light. Conjunctivae and EOM are normal. Right eye exhibits no discharge. Left eye exhibits no discharge. No scleral icterus.   Neck: Normal range of motion. Neck supple. No JVD present. No tracheal deviation present. No thyromegaly present.   Cardiovascular: Normal rate, regular rhythm, normal heart sounds and intact distal pulses. Exam reveals no gallop and no friction rub.   No murmur heard.  Pulmonary/Chest: Effort normal and breath sounds normal. No stridor. No respiratory distress. She has no wheezes. She has no rales. She exhibits no tenderness.   Abdominal: Soft. Bowel sounds are normal. She exhibits no distension and no mass. There is no tenderness. There is no rebound and no guarding.   Musculoskeletal: Normal range of motion. She exhibits no edema or tenderness.   Lymphadenopathy:     She has no cervical adenopathy.   Neurological: She is alert and oriented to person, place, and time. She has normal reflexes. No cranial nerve deficit. She exhibits normal muscle tone. Coordination normal.   Skin: Skin is warm. No rash noted. She is not diaphoretic. No erythema. No pallor.   Psychiatric: She has a normal mood and affect. Her behavior is normal. Judgment and thought content normal.       Assessment:     1. Hypercholesteremia    2. Bilateral carotid artery stenosis    3. S/P angioplasty with stent    4. Stage 3 chronic kidney disease    5. Myelodysplastic syndrome    6. Essential hypertension    7. Coronary artery disease, angina presence unspecified, unspecified vessel or lesion type, unspecified whether native or transplanted heart    8. Controlled type 2 diabetes mellitus with stage 3 chronic kidney disease, without long-term current use of insulin    9. CAD S/P percutaneous coronary angioplasty    10. History of CEA (carotid endarterectomy)        Plan:   Patients is working with the hematologist on the MDS, will review echo  given she was unresponsive (no mention os arrhythmias). We talked about lisinopril association with lung cancer. She wants to continue her meds for now. We discussed that carotid disease has progressed and that praluent has been a good drug for her.        Counseled on importance of heart healthy diet low in saturated and trans fat and salt as well gradually starting a regular aerobic exercise regimen with goal of 30min 5x/week. Recommend BP diary. Call if systolic BP > 130 mmHg on checking repeatedly  RTC 1 yr  Susan was seen today for coronary artery disease, angina presence unspecified, unspec.    Diagnoses and all orders for this visit:    Hypercholesteremia    Bilateral carotid artery stenosis    S/P angioplasty with stent  -     Transthoracic echo (TTE) 2D with Color Flow; Future    Stage 3 chronic kidney disease    Myelodysplastic syndrome    Essential hypertension    Coronary artery disease, angina presence unspecified, unspecified vessel or lesion type, unspecified whether native or transplanted heart  -     Transthoracic echo (TTE) 2D with Color Flow; Future    Controlled type 2 diabetes mellitus with stage 3 chronic kidney disease, without long-term current use of insulin    CAD S/P percutaneous coronary angioplasty    History of CEA (carotid endarterectomy)

## 2019-08-27 ENCOUNTER — LAB VISIT (OUTPATIENT)
Dept: LAB | Facility: HOSPITAL | Age: 69
End: 2019-08-27
Attending: INTERNAL MEDICINE
Payer: MEDICARE

## 2019-08-27 DIAGNOSIS — D46.C MDS (MYELODYSPLASTIC SYNDROME) WITH 5Q DELETION: ICD-10-CM

## 2019-08-27 LAB
ABO + RH BLD: NORMAL
ALBUMIN SERPL BCP-MCNC: 3.9 G/DL (ref 3.5–5.2)
ALP SERPL-CCNC: 72 U/L (ref 55–135)
ALT SERPL W/O P-5'-P-CCNC: 72 U/L (ref 10–44)
ANION GAP SERPL CALC-SCNC: 12 MMOL/L (ref 8–16)
ANISOCYTOSIS BLD QL SMEAR: SLIGHT
AST SERPL-CCNC: 36 U/L (ref 10–40)
BASOPHILS NFR BLD: 1 % (ref 0–1.9)
BILIRUB SERPL-MCNC: 0.5 MG/DL (ref 0.1–1)
BLD GP AB SCN CELLS X3 SERPL QL: NORMAL
BUN SERPL-MCNC: 27 MG/DL (ref 8–23)
CALCIUM SERPL-MCNC: 9.8 MG/DL (ref 8.7–10.5)
CHLORIDE SERPL-SCNC: 106 MMOL/L (ref 95–110)
CO2 SERPL-SCNC: 24 MMOL/L (ref 23–29)
CREAT SERPL-MCNC: 1.4 MG/DL (ref 0.5–1.4)
DACRYOCYTES BLD QL SMEAR: ABNORMAL
DIFFERENTIAL METHOD: ABNORMAL
EOSINOPHIL NFR BLD: 3 % (ref 0–8)
ERYTHROCYTE [DISTWIDTH] IN BLOOD BY AUTOMATED COUNT: 18.2 % (ref 11.5–14.5)
EST. GFR  (AFRICAN AMERICAN): 44.2 ML/MIN/1.73 M^2
EST. GFR  (NON AFRICAN AMERICAN): 38.4 ML/MIN/1.73 M^2
GLUCOSE SERPL-MCNC: 136 MG/DL (ref 70–110)
HCT VFR BLD AUTO: 20.3 % (ref 37–48.5)
HGB BLD-MCNC: 6.7 G/DL (ref 12–16)
HYPOCHROMIA BLD QL SMEAR: ABNORMAL
IMM GRANULOCYTES # BLD AUTO: ABNORMAL K/UL (ref 0–0.04)
IMM GRANULOCYTES NFR BLD AUTO: ABNORMAL % (ref 0–0.5)
LYMPHOCYTES NFR BLD: 41 % (ref 18–48)
MCH RBC QN AUTO: 31.9 PG (ref 27–31)
MCHC RBC AUTO-ENTMCNC: 33 G/DL (ref 32–36)
MCV RBC AUTO: 97 FL (ref 82–98)
MONOCYTES NFR BLD: 0 % (ref 4–15)
NEUTROPHILS NFR BLD: 55 % (ref 38–73)
NRBC BLD-RTO: 0 /100 WBC
OVALOCYTES BLD QL SMEAR: ABNORMAL
PLATELET # BLD AUTO: 69 K/UL (ref 150–350)
PLATELET BLD QL SMEAR: ABNORMAL
PMV BLD AUTO: 11.4 FL (ref 9.2–12.9)
POIKILOCYTOSIS BLD QL SMEAR: SLIGHT
POTASSIUM SERPL-SCNC: 4 MMOL/L (ref 3.5–5.1)
PROT SERPL-MCNC: 7.2 G/DL (ref 6–8.4)
RBC # BLD AUTO: 2.1 M/UL (ref 4–5.4)
SODIUM SERPL-SCNC: 142 MMOL/L (ref 136–145)
WBC # BLD AUTO: 2.13 K/UL (ref 3.9–12.7)

## 2019-08-27 PROCEDURE — 86850 RBC ANTIBODY SCREEN: CPT | Mod: HCNC

## 2019-08-27 PROCEDURE — 85007 BL SMEAR W/DIFF WBC COUNT: CPT | Mod: HCNC

## 2019-08-27 PROCEDURE — 80053 COMPREHEN METABOLIC PANEL: CPT | Mod: HCNC

## 2019-08-27 PROCEDURE — 85027 COMPLETE CBC AUTOMATED: CPT | Mod: HCNC

## 2019-08-27 PROCEDURE — 36415 COLL VENOUS BLD VENIPUNCTURE: CPT | Mod: HCNC

## 2019-09-03 ENCOUNTER — TELEPHONE (OUTPATIENT)
Dept: HEMATOLOGY/ONCOLOGY | Facility: CLINIC | Age: 69
End: 2019-09-03

## 2019-09-03 ENCOUNTER — LAB VISIT (OUTPATIENT)
Dept: LAB | Facility: HOSPITAL | Age: 69
End: 2019-09-03
Attending: INTERNAL MEDICINE
Payer: MEDICARE

## 2019-09-03 ENCOUNTER — PATIENT MESSAGE (OUTPATIENT)
Dept: HEMATOLOGY/ONCOLOGY | Facility: CLINIC | Age: 69
End: 2019-09-03

## 2019-09-03 ENCOUNTER — INFUSION (OUTPATIENT)
Dept: INFUSION THERAPY | Facility: HOSPITAL | Age: 69
End: 2019-09-03
Attending: INTERNAL MEDICINE
Payer: MEDICARE

## 2019-09-03 VITALS
TEMPERATURE: 98 F | RESPIRATION RATE: 18 BRPM | DIASTOLIC BLOOD PRESSURE: 63 MMHG | HEART RATE: 82 BPM | SYSTOLIC BLOOD PRESSURE: 129 MMHG | OXYGEN SATURATION: 99 %

## 2019-09-03 DIAGNOSIS — D46.C MDS (MYELODYSPLASTIC SYNDROME) WITH 5Q DELETION: ICD-10-CM

## 2019-09-03 DIAGNOSIS — D46.9 MDS (MYELODYSPLASTIC SYNDROME): ICD-10-CM

## 2019-09-03 DIAGNOSIS — D63.0 ANEMIA IN NEOPLASTIC DISEASE: Primary | ICD-10-CM

## 2019-09-03 DIAGNOSIS — D63.0 ANEMIA IN NEOPLASTIC DISEASE: ICD-10-CM

## 2019-09-03 LAB
ABO + RH BLD: NORMAL
ALBUMIN SERPL BCP-MCNC: 3.9 G/DL (ref 3.5–5.2)
ALP SERPL-CCNC: 71 U/L (ref 55–135)
ALT SERPL W/O P-5'-P-CCNC: 42 U/L (ref 10–44)
ANION GAP SERPL CALC-SCNC: 12 MMOL/L (ref 8–16)
ANISOCYTOSIS BLD QL SMEAR: ABNORMAL
AST SERPL-CCNC: 20 U/L (ref 10–40)
BASOPHILS # BLD AUTO: ABNORMAL K/UL (ref 0–0.2)
BASOPHILS NFR BLD: 1 % (ref 0–1.9)
BILIRUB SERPL-MCNC: 0.5 MG/DL (ref 0.1–1)
BLD GP AB SCN CELLS X3 SERPL QL: NORMAL
BLD PROD TYP BPU: NORMAL
BLOOD UNIT EXPIRATION DATE: NORMAL
BLOOD UNIT TYPE CODE: 6200
BLOOD UNIT TYPE: NORMAL
BUN SERPL-MCNC: 22 MG/DL (ref 8–23)
CALCIUM SERPL-MCNC: 9.9 MG/DL (ref 8.7–10.5)
CHLORIDE SERPL-SCNC: 106 MMOL/L (ref 95–110)
CO2 SERPL-SCNC: 24 MMOL/L (ref 23–29)
CODING SYSTEM: NORMAL
CREAT SERPL-MCNC: 1.3 MG/DL (ref 0.5–1.4)
DIFFERENTIAL METHOD: ABNORMAL
DISPENSE STATUS: NORMAL
EOSINOPHIL # BLD AUTO: ABNORMAL K/UL (ref 0–0.5)
EOSINOPHIL NFR BLD: 3 % (ref 0–8)
ERYTHROCYTE [DISTWIDTH] IN BLOOD BY AUTOMATED COUNT: 20.2 % (ref 11.5–14.5)
EST. GFR  (AFRICAN AMERICAN): 48.4 ML/MIN/1.73 M^2
EST. GFR  (NON AFRICAN AMERICAN): 42 ML/MIN/1.73 M^2
GLUCOSE SERPL-MCNC: 98 MG/DL (ref 70–110)
HCT VFR BLD AUTO: 18.3 % (ref 37–48.5)
HGB BLD-MCNC: 5.8 G/DL (ref 12–16)
IMM GRANULOCYTES # BLD AUTO: ABNORMAL K/UL (ref 0–0.04)
IMM GRANULOCYTES NFR BLD AUTO: ABNORMAL % (ref 0–0.5)
LYMPHOCYTES # BLD AUTO: ABNORMAL K/UL (ref 1–4.8)
LYMPHOCYTES NFR BLD: 82 % (ref 18–48)
MCH RBC QN AUTO: 31.7 PG (ref 27–31)
MCHC RBC AUTO-ENTMCNC: 31.7 G/DL (ref 32–36)
MCV RBC AUTO: 100 FL (ref 82–98)
MONOCYTES # BLD AUTO: ABNORMAL K/UL (ref 0.3–1)
MONOCYTES NFR BLD: 0 % (ref 4–15)
NEUTROPHILS # BLD AUTO: ABNORMAL K/UL (ref 1.8–7.7)
NEUTROPHILS NFR BLD: 14 % (ref 38–73)
NRBC BLD-RTO: 0 /100 WBC
NUM UNITS TRANS PACKED RBC: NORMAL
PLATELET # BLD AUTO: 180 K/UL (ref 150–350)
PLATELET BLD QL SMEAR: ABNORMAL
PMV BLD AUTO: 11.5 FL (ref 9.2–12.9)
POLYCHROMASIA BLD QL SMEAR: ABNORMAL
POTASSIUM SERPL-SCNC: 4.4 MMOL/L (ref 3.5–5.1)
PROT SERPL-MCNC: 7.2 G/DL (ref 6–8.4)
RBC # BLD AUTO: 1.83 M/UL (ref 4–5.4)
SODIUM SERPL-SCNC: 142 MMOL/L (ref 136–145)
WBC # BLD AUTO: 1.47 K/UL (ref 3.9–12.7)

## 2019-09-03 PROCEDURE — 86901 BLOOD TYPING SEROLOGIC RH(D): CPT | Mod: HCNC

## 2019-09-03 PROCEDURE — 63600175 PHARM REV CODE 636 W HCPCS: Mod: HCNC | Performed by: INTERNAL MEDICINE

## 2019-09-03 PROCEDURE — 85027 COMPLETE CBC AUTOMATED: CPT | Mod: HCNC

## 2019-09-03 PROCEDURE — 27201040 HC RC 50 FILTER: Mod: HCNC

## 2019-09-03 PROCEDURE — 25000003 PHARM REV CODE 250: Mod: HCNC | Performed by: INTERNAL MEDICINE

## 2019-09-03 PROCEDURE — 86920 COMPATIBILITY TEST SPIN: CPT | Mod: HCNC

## 2019-09-03 PROCEDURE — 85007 BL SMEAR W/DIFF WBC COUNT: CPT | Mod: HCNC

## 2019-09-03 PROCEDURE — P9038 RBC IRRADIATED: HCPCS | Mod: HCNC

## 2019-09-03 PROCEDURE — 36430 TRANSFUSION BLD/BLD COMPNT: CPT | Mod: HCNC

## 2019-09-03 PROCEDURE — 80053 COMPREHEN METABOLIC PANEL: CPT | Mod: HCNC

## 2019-09-03 PROCEDURE — 36415 COLL VENOUS BLD VENIPUNCTURE: CPT | Mod: HCNC

## 2019-09-03 RX ORDER — DIPHENHYDRAMINE HCL 25 MG
25 CAPSULE ORAL
Status: COMPLETED | OUTPATIENT
Start: 2019-09-03 | End: 2019-09-03

## 2019-09-03 RX ORDER — HYDROCODONE BITARTRATE AND ACETAMINOPHEN 500; 5 MG/1; MG/1
TABLET ORAL ONCE
Status: CANCELLED | OUTPATIENT
Start: 2019-09-03 | End: 2019-09-03

## 2019-09-03 RX ORDER — DIPHENHYDRAMINE HCL 25 MG
25 CAPSULE ORAL
Status: CANCELLED | OUTPATIENT
Start: 2019-09-03

## 2019-09-03 RX ORDER — ACETAMINOPHEN 325 MG/1
650 TABLET ORAL
Status: COMPLETED | OUTPATIENT
Start: 2019-09-03 | End: 2019-09-03

## 2019-09-03 RX ORDER — ACETAMINOPHEN 325 MG/1
650 TABLET ORAL
Status: CANCELLED | OUTPATIENT
Start: 2019-09-03

## 2019-09-03 RX ORDER — HYDROCODONE BITARTRATE AND ACETAMINOPHEN 500; 5 MG/1; MG/1
TABLET ORAL ONCE
Status: COMPLETED | OUTPATIENT
Start: 2019-09-03 | End: 2019-09-03

## 2019-09-03 RX ADMIN — ACETAMINOPHEN 650 MG: 325 TABLET ORAL at 02:09

## 2019-09-03 RX ADMIN — DIPHENHYDRAMINE HYDROCHLORIDE 25 MG: 25 CAPSULE ORAL at 02:09

## 2019-09-03 RX ADMIN — SODIUM CHLORIDE: 0.9 INJECTION, SOLUTION INTRAVENOUS at 02:09

## 2019-09-03 NOTE — PLAN OF CARE
Problem: Adult Inpatient Plan of Care  Goal: Plan of Care Review  Outcome: Ongoing (interventions implemented as appropriate)  Pt received one unit pf PRBC; tolerated well. VSS and NAD. Pt instructed to call MD with any concerns. Pt discharged home independently.

## 2019-09-09 ENCOUNTER — TELEPHONE (OUTPATIENT)
Dept: HEMATOLOGY/ONCOLOGY | Facility: CLINIC | Age: 69
End: 2019-09-09

## 2019-09-09 ENCOUNTER — INFUSION (OUTPATIENT)
Dept: INFUSION THERAPY | Facility: HOSPITAL | Age: 69
End: 2019-09-09
Attending: INTERNAL MEDICINE
Payer: MEDICARE

## 2019-09-09 ENCOUNTER — OFFICE VISIT (OUTPATIENT)
Dept: HEMATOLOGY/ONCOLOGY | Facility: CLINIC | Age: 69
End: 2019-09-09
Payer: MEDICARE

## 2019-09-09 VITALS
OXYGEN SATURATION: 98 % | BODY MASS INDEX: 31.36 KG/M2 | WEIGHT: 188.25 LBS | HEIGHT: 65 IN | HEART RATE: 85 BPM | DIASTOLIC BLOOD PRESSURE: 60 MMHG | TEMPERATURE: 98 F | SYSTOLIC BLOOD PRESSURE: 132 MMHG

## 2019-09-09 VITALS — TEMPERATURE: 98 F | SYSTOLIC BLOOD PRESSURE: 139 MMHG | DIASTOLIC BLOOD PRESSURE: 63 MMHG | HEART RATE: 91 BPM

## 2019-09-09 DIAGNOSIS — D61.818 PANCYTOPENIA: ICD-10-CM

## 2019-09-09 DIAGNOSIS — E11.22 CONTROLLED TYPE 2 DIABETES MELLITUS WITH STAGE 3 CHRONIC KIDNEY DISEASE, WITHOUT LONG-TERM CURRENT USE OF INSULIN: ICD-10-CM

## 2019-09-09 DIAGNOSIS — F43.23 ADJUSTMENT DISORDER WITH MIXED ANXIETY AND DEPRESSED MOOD: ICD-10-CM

## 2019-09-09 DIAGNOSIS — D46.9 MYELODYSPLASTIC SYNDROME: Primary | ICD-10-CM

## 2019-09-09 DIAGNOSIS — I25.10 CORONARY ARTERY DISEASE, ANGINA PRESENCE UNSPECIFIED, UNSPECIFIED VESSEL OR LESION TYPE, UNSPECIFIED WHETHER NATIVE OR TRANSPLANTED HEART: ICD-10-CM

## 2019-09-09 DIAGNOSIS — I10 ESSENTIAL HYPERTENSION: ICD-10-CM

## 2019-09-09 DIAGNOSIS — E86.0 DEHYDRATION: ICD-10-CM

## 2019-09-09 DIAGNOSIS — N18.30 CONTROLLED TYPE 2 DIABETES MELLITUS WITH STAGE 3 CHRONIC KIDNEY DISEASE, WITHOUT LONG-TERM CURRENT USE OF INSULIN: ICD-10-CM

## 2019-09-09 DIAGNOSIS — D46.C MDS (MYELODYSPLASTIC SYNDROME) WITH 5Q DELETION: ICD-10-CM

## 2019-09-09 DIAGNOSIS — N18.30 STAGE 3 CHRONIC KIDNEY DISEASE: ICD-10-CM

## 2019-09-09 PROCEDURE — 99215 OFFICE O/P EST HI 40 MIN: CPT | Mod: HCNC,S$GLB,, | Performed by: NURSE PRACTITIONER

## 2019-09-09 PROCEDURE — 25000003 PHARM REV CODE 250: Mod: HCNC | Performed by: NURSE PRACTITIONER

## 2019-09-09 PROCEDURE — 3078F PR MOST RECENT DIASTOLIC BLOOD PRESSURE < 80 MM HG: ICD-10-PCS | Mod: HCNC,CPTII,S$GLB, | Performed by: NURSE PRACTITIONER

## 2019-09-09 PROCEDURE — 99499 RISK ADDL DX/OHS AUDIT: ICD-10-PCS | Mod: HCNC,S$GLB,, | Performed by: NURSE PRACTITIONER

## 2019-09-09 PROCEDURE — P9040 RBC LEUKOREDUCED IRRADIATED: HCPCS | Mod: HCNC

## 2019-09-09 PROCEDURE — 3075F PR MOST RECENT SYSTOLIC BLOOD PRESS GE 130-139MM HG: ICD-10-PCS | Mod: HCNC,CPTII,S$GLB, | Performed by: NURSE PRACTITIONER

## 2019-09-09 PROCEDURE — 3075F SYST BP GE 130 - 139MM HG: CPT | Mod: HCNC,CPTII,S$GLB, | Performed by: NURSE PRACTITIONER

## 2019-09-09 PROCEDURE — 99999 PR PBB SHADOW E&M-EST. PATIENT-LVL IV: ICD-10-PCS | Mod: PBBFAC,HCNC,, | Performed by: NURSE PRACTITIONER

## 2019-09-09 PROCEDURE — 63600175 PHARM REV CODE 636 W HCPCS: Mod: JG,HCNC | Performed by: INTERNAL MEDICINE

## 2019-09-09 PROCEDURE — 36430 TRANSFUSION BLD/BLD COMPNT: CPT | Mod: HCNC

## 2019-09-09 PROCEDURE — 1101F PR PT FALLS ASSESS DOC 0-1 FALLS W/OUT INJ PAST YR: ICD-10-PCS | Mod: HCNC,CPTII,S$GLB, | Performed by: NURSE PRACTITIONER

## 2019-09-09 PROCEDURE — 99499 UNLISTED E&M SERVICE: CPT | Mod: HCNC,S$GLB,, | Performed by: NURSE PRACTITIONER

## 2019-09-09 PROCEDURE — 63600175 PHARM REV CODE 636 W HCPCS: Mod: HCNC | Performed by: NURSE PRACTITIONER

## 2019-09-09 PROCEDURE — 99215 PR OFFICE/OUTPT VISIT, EST, LEVL V, 40-54 MIN: ICD-10-PCS | Mod: HCNC,S$GLB,, | Performed by: NURSE PRACTITIONER

## 2019-09-09 PROCEDURE — 3078F DIAST BP <80 MM HG: CPT | Mod: HCNC,CPTII,S$GLB, | Performed by: NURSE PRACTITIONER

## 2019-09-09 PROCEDURE — 86920 COMPATIBILITY TEST SPIN: CPT | Mod: HCNC

## 2019-09-09 PROCEDURE — 1101F PT FALLS ASSESS-DOCD LE1/YR: CPT | Mod: HCNC,CPTII,S$GLB, | Performed by: NURSE PRACTITIONER

## 2019-09-09 PROCEDURE — 99999 PR PBB SHADOW E&M-EST. PATIENT-LVL IV: CPT | Mod: PBBFAC,HCNC,, | Performed by: NURSE PRACTITIONER

## 2019-09-09 RX ORDER — DIPHENHYDRAMINE HCL 25 MG
25 CAPSULE ORAL
Status: CANCELLED | OUTPATIENT
Start: 2019-09-09

## 2019-09-09 RX ORDER — FLUCONAZOLE 200 MG/1
400 TABLET ORAL DAILY
Qty: 60 TABLET | Refills: 6 | Status: SHIPPED | OUTPATIENT
Start: 2019-09-09 | End: 2020-01-06 | Stop reason: SDUPTHER

## 2019-09-09 RX ORDER — ACETAMINOPHEN 325 MG/1
650 TABLET ORAL
Status: CANCELLED | OUTPATIENT
Start: 2019-09-09

## 2019-09-09 RX ORDER — ACETAMINOPHEN 325 MG/1
650 TABLET ORAL
Status: COMPLETED | OUTPATIENT
Start: 2019-09-09 | End: 2019-09-09

## 2019-09-09 RX ORDER — ACYCLOVIR 400 MG/1
400 TABLET ORAL 2 TIMES DAILY
Qty: 50 TABLET | Refills: 0 | Status: SHIPPED | OUTPATIENT
Start: 2019-09-09 | End: 2019-09-09 | Stop reason: SDUPTHER

## 2019-09-09 RX ORDER — ONDANSETRON 2 MG/ML
8 INJECTION INTRAMUSCULAR; INTRAVENOUS
Status: CANCELLED
Start: 2019-09-09

## 2019-09-09 RX ORDER — ACYCLOVIR 400 MG/1
400 TABLET ORAL 2 TIMES DAILY
Qty: 60 TABLET | Refills: 6 | Status: SHIPPED | OUTPATIENT
Start: 2019-09-09 | End: 2019-10-07 | Stop reason: SDUPTHER

## 2019-09-09 RX ORDER — HYDROCODONE BITARTRATE AND ACETAMINOPHEN 500; 5 MG/1; MG/1
TABLET ORAL ONCE
Status: COMPLETED | OUTPATIENT
Start: 2019-09-09 | End: 2019-09-09

## 2019-09-09 RX ORDER — DIPHENHYDRAMINE HCL 25 MG
25 CAPSULE ORAL
Status: COMPLETED | OUTPATIENT
Start: 2019-09-09 | End: 2019-09-09

## 2019-09-09 RX ORDER — AZACITIDINE 100 MG/1
75 INJECTION, POWDER, LYOPHILIZED, FOR SOLUTION INTRAVENOUS; SUBCUTANEOUS
Status: CANCELLED | OUTPATIENT
Start: 2019-09-09

## 2019-09-09 RX ORDER — HYDROCODONE BITARTRATE AND ACETAMINOPHEN 500; 5 MG/1; MG/1
TABLET ORAL ONCE
Status: CANCELLED | OUTPATIENT
Start: 2019-09-09 | End: 2019-09-09

## 2019-09-09 RX ORDER — AZACITIDINE 100 MG/1
75 INJECTION, POWDER, LYOPHILIZED, FOR SOLUTION INTRAVENOUS; SUBCUTANEOUS
Status: COMPLETED | OUTPATIENT
Start: 2019-09-09 | End: 2019-09-09

## 2019-09-09 RX ORDER — ONDANSETRON 2 MG/ML
8 INJECTION INTRAMUSCULAR; INTRAVENOUS
Status: DISCONTINUED | OUTPATIENT
Start: 2019-09-09 | End: 2019-09-09 | Stop reason: HOSPADM

## 2019-09-09 RX ADMIN — AZACITIDINE 150 MG: 100 INJECTION, POWDER, LYOPHILIZED, FOR SOLUTION INTRAVENOUS; SUBCUTANEOUS at 05:09

## 2019-09-09 RX ADMIN — ACETAMINOPHEN 650 MG: 325 TABLET ORAL at 03:09

## 2019-09-09 RX ADMIN — SODIUM CHLORIDE: 0.9 INJECTION, SOLUTION INTRAVENOUS at 03:09

## 2019-09-09 RX ADMIN — DIPHENHYDRAMINE HYDROCHLORIDE 25 MG: 25 CAPSULE ORAL at 03:09

## 2019-09-09 NOTE — Clinical Note
- CBC and type and screen weekly for possible blood product transfusions, on Mondays - Return visit with CBC, type and screen, CMP and appt with Dr. Valdes or NP on 10/7/2019. Chemo chair for cycle 5 vidaza 10/7/19 - 10/11/19

## 2019-09-09 NOTE — PROGRESS NOTES
Subjective:       Patient ID: Susan Puente is a 69 y.o. female.    Chief Complaint: MDS (myelodysplastic syndrome) with 5q deletion    HPI: Patient presents today for follow up of her history of MDS 5 q del syndrome prior to cycle 5 Vidaza as third line therapy for 5q minus syndrome. Will receive blood today for hgb 6.6 gm/dl. Small amounts of diarrhea after eating cabbage recently, resolving on it's own. Denies fevers, chills, n/v/c, night sweats which require changing clothes, sob, chest pain. Presents to clinic alone.     Oncology History   Ms. Puente is a 68 year old female with hx of DM2, peripheral vascular disease, tobacco use, CAD, hyperlipidemia, hypertension who was hospitalized 11/10/16 for anemia. hgb 5.3  MCH 43.4 with normal WBC and platelets. Patient had normal iron stores. She was transfused 3 units of PRBCs and discharged home with hgb 8.7 on 11/11/16. She was referred for further evalutation of her anemia. On 12/16/16 patient had a normal SPEP and immunofixation. Slightly elevated kappa light chains with normal ration. Elevated vitamin b12, normal folate, JAYDEN was positive with a low titer and negative profile. Patient had a bone marrow biopsy 1/5/16 which showed the core biopsy is normocellular for age (40%); however, megakaryocytes are increased and show frequent small, hypolobated forms. Additionally, a subset of the neutrophils are hypogranular. Blasts are not increased by either morphology (1.2%) or in the corresponding flow cytometric analysis. Fish detects a 5q deletion in 54.5% of nuclei. Cytogenetics reported 20 metaphases, 2 metaphases were normal and 18 metaphases had a 5q deletion. No additional cytogenetic abnormalities were detected. Findings consistent with 5q deletion syndrome.     Patient had a delay in obtaining Revlimid 10 mg daily but did start taking the medication 2/8/17. On 2/9/17 patient developed a diffuse maculopapular rash throughout scalp arms, legs and  torso. She states she had no stridor or wheezing. She discontinued medication 2/15/17. Went to allergist who provided references on desensitization so that patient could resume Revlimid. Unfortunately developed pancytopenia and Revlimid stopped 6/16/17. She had a repeat BMBX  performed 6/8/17 and showed a hypercellular marrow (60%) continued atypia in the granulocytes and megakaryocytes were noted.  Additionally, there is erythroid atypia present. No increase in blasts. Cytogenetics are normal and MDS FISH is negative, failing to show 5 q minus. NGS should no significant molecular mutations.  Anemia work up revealed bienvenido negative hemolysis.    Revlimid has been stopped since June 2017 after she developed pancytopenia while on Revlimid (required desensitization). CBC was normal for almost 1 year and marrow was negative for del5q. Bone marrow biopsy repeat from 6/2018 showed relapsed 5q minus MDS without new or additional mutations. Revlimid restarted at 2.5 mg daily with prednisone to prevent allergic reaction. Titrated to a goal of 10mg daily Revlimid. Hospital admission 3/12-3/17/19 for unresponsive event after blood transfusion, found to be profoundly pancytopenic at admission. Since hospital admission Revlimid has been stopped. Repeat marrow March 2019 shows persistent MDS with 5q minus.     Review of Systems   Constitutional: Negative for fever, chills, weight loss, fatigue.   HENT: Negative for sinus congestion or rhinorrhea. Negative for ear pain, mouth sores, nosebleeds and trouble swallowing.    Respiratory: Negative for cough, shortness of breath and wheezing.    Cardiovascular: Negative for chest pain and leg swelling.   Gastrointestinal: Negative for abdominal distention, abdominal pain, blood in stool, constipation, diarrhea, nausea and vomiting.   Endocrine: Negative for polyphagia and polyuria.   Genitourinary: Negative for dysuria, hematuria and urgency.   Musculoskeletal: Positive for arthralgias.  Negative for myalgias.   Skin: Negative for color change, pallor and rash.   Neurological: Negative for dizziness, weakness, light-headedness and headaches.   Hematological: Negative for adenopathy. Does not bruise/bleed easily.   Psychiatric/Behavioral: Negative for agitation and behavioral problems.       Objective:      Physical Exam   Constitutional: She is oriented to person, place, and time. She appears well-developed and well-nourished.   HENT:   Mouth/Throat: Oropharynx is clear and moist. No oropharyngeal exudate.   Eyes: Conjunctivae and EOM are normal. Right eye exhibits no discharge. Left eye exhibits no discharge.   Neck: Normal range of motion. Neck supple.   Cardiovascular: Normal rate, regular rhythm, normal heart sounds and intact distal pulses.   No murmur heard.  Pulmonary/Chest: Effort normal and breath sounds normal. No respiratory distress. She has no wheezes.   Abdominal: Soft. Bowel sounds are normal. She exhibits no distension and no mass. There is no tenderness.   Musculoskeletal: Normal range of motion. She exhibits no edema.   Neurological: She is alert and oriented to person, place, and time.   Skin: Skin is warm and dry. No rash noted.   Psychiatric: She has a normal mood and affect. Her behavior is normal. Judgment and thought content normal.   Nursing note and vitals reviewed.      Assessment:       1. Myelodysplastic syndrome    2. Pancytopenia    3. Stage 3 chronic kidney disease    4. Controlled type 2 diabetes mellitus with stage 3 chronic kidney disease, without long-term current use of insulin    5. Essential hypertension    6. Coronary artery disease, angina presence unspecified, unspecified vessel or lesion type, unspecified whether native or transplanted heart    7. Adjustment disorder with mixed anxiety and depressed mood    8. Dehydration        Plan:     MDS  - Initially with 5 q minus syndrome and treated with Revlimid. Developed allergy and was then desensitized. Soon  after developed pancytopenia and Revlimid held since June 2017.  - BMBX on 6/8/17 was with normal cytogenetics. 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 Discussed treatment options of HMA therapy or repeat trial of Revlimid and patient wished to proceed with Revlimid   - Revlimid stopped again due to repeat allergic reaction and pancytopenia 3/2019  - Started cycle 1 Vidaza 5/6/19 subq for 5 days every 28 days  - Tolerated cycles 1-4 well. Package insert for Vidaza recommends holding if ANC <1000, however pt has been receiving this with an ANC below 1000 for each cycle. Will proceed with C5 today as she has been receiving this  - Message sent to primary oncologist Dr. Valdes regarding when pt should be scheduled for her next BM bx    Cytopenias 2/2 disease: anemia and leukopenia  - Transfuse for hgb < 7 or plts < 10K  - WBC 1.60; hgb 6.6; plt normal;   - Transfuse 1 unit prbc at 3 pm today in infusion center  - Continue ppx cipro, acyclovir, and fluconazole; refills sent today     Stage 3 CKD  - stable; continue to monitor with visits    DM2  - management per PCP  - continue metformin    HTN  - management per PCP  - continue lisinopril-HCTZ    CAD  - continue praluent for HLD per PCP (intolerant to statins)  - continue ASA    Anxiety/Depression  - Improved mood on Celexa. Continue    Dehydration hx  - continue to encourage increased oral fluid intake    F/U  - 1 unit prbcs in infusion center at 3 pm today  - CBC and type and screen weekly for possible blood product transfusions, on Mondays   - Return visit with CBC, type and screen, CMP and appt with Dr. Valdes or NP on 10/7/2019. Chemo chair for cycle 6 vidaza 10/7/19 - 10/11/19  - Dr. Valdes to clarify when to schedule next BM bx    Bernadette Ruiz, ALEA, NP  Hematology/Oncology    Discuss with collaborating physician Dr. Sotomayor as Dr. Valdes out today.    Addendum 9/10/19 842 am:  After speaking with Dr. Valdes, pt's primary oncologist. She is okay to continue  therapy with ANC less than 1000. She will sign cycle 5 days 2-5. She would like pt to have a BM bx after cycle 6.

## 2019-09-10 ENCOUNTER — INFUSION (OUTPATIENT)
Dept: INFUSION THERAPY | Facility: HOSPITAL | Age: 69
End: 2019-09-10
Attending: INTERNAL MEDICINE
Payer: MEDICARE

## 2019-09-10 VITALS — SYSTOLIC BLOOD PRESSURE: 151 MMHG | HEART RATE: 92 BPM | DIASTOLIC BLOOD PRESSURE: 65 MMHG

## 2019-09-10 DIAGNOSIS — D46.C MDS (MYELODYSPLASTIC SYNDROME) WITH 5Q DELETION: Primary | ICD-10-CM

## 2019-09-10 PROCEDURE — 96401 CHEMO ANTI-NEOPL SQ/IM: CPT | Mod: HCNC

## 2019-09-10 PROCEDURE — 63600175 PHARM REV CODE 636 W HCPCS: Mod: JG,HCNC | Performed by: INTERNAL MEDICINE

## 2019-09-10 RX ORDER — ONDANSETRON 2 MG/ML
8 INJECTION INTRAMUSCULAR; INTRAVENOUS
Status: CANCELLED
Start: 2019-09-13

## 2019-09-10 RX ORDER — ONDANSETRON 2 MG/ML
8 INJECTION INTRAMUSCULAR; INTRAVENOUS
Status: CANCELLED
Start: 2019-09-10

## 2019-09-10 RX ORDER — AZACITIDINE 100 MG/1
75 INJECTION, POWDER, LYOPHILIZED, FOR SOLUTION INTRAVENOUS; SUBCUTANEOUS
Status: COMPLETED | OUTPATIENT
Start: 2019-09-10 | End: 2019-09-10

## 2019-09-10 RX ORDER — AZACITIDINE 100 MG/1
75 INJECTION, POWDER, LYOPHILIZED, FOR SOLUTION INTRAVENOUS; SUBCUTANEOUS
Status: CANCELLED | OUTPATIENT
Start: 2019-09-10

## 2019-09-10 RX ORDER — AZACITIDINE 100 MG/1
75 INJECTION, POWDER, LYOPHILIZED, FOR SOLUTION INTRAVENOUS; SUBCUTANEOUS
Status: CANCELLED | OUTPATIENT
Start: 2019-09-11

## 2019-09-10 RX ORDER — ONDANSETRON 2 MG/ML
8 INJECTION INTRAMUSCULAR; INTRAVENOUS
Status: CANCELLED
Start: 2019-09-12

## 2019-09-10 RX ORDER — AZACITIDINE 100 MG/1
75 INJECTION, POWDER, LYOPHILIZED, FOR SOLUTION INTRAVENOUS; SUBCUTANEOUS
Status: CANCELLED | OUTPATIENT
Start: 2019-09-12

## 2019-09-10 RX ORDER — ONDANSETRON 2 MG/ML
8 INJECTION INTRAMUSCULAR; INTRAVENOUS
Status: CANCELLED
Start: 2019-09-11

## 2019-09-10 RX ORDER — AZACITIDINE 100 MG/1
75 INJECTION, POWDER, LYOPHILIZED, FOR SOLUTION INTRAVENOUS; SUBCUTANEOUS
Status: CANCELLED | OUTPATIENT
Start: 2019-09-13

## 2019-09-10 RX ADMIN — AZACITIDINE 150 MG: 100 INJECTION, POWDER, LYOPHILIZED, FOR SOLUTION INTRAVENOUS; SUBCUTANEOUS at 02:09

## 2019-09-10 NOTE — NURSING
Patient here for vidaza injections-given in 3 divided doses-complains of severe fatigue-tolerated well.

## 2019-09-11 ENCOUNTER — INFUSION (OUTPATIENT)
Dept: INFUSION THERAPY | Facility: HOSPITAL | Age: 69
End: 2019-09-11
Attending: INTERNAL MEDICINE
Payer: MEDICARE

## 2019-09-11 VITALS — SYSTOLIC BLOOD PRESSURE: 133 MMHG | DIASTOLIC BLOOD PRESSURE: 60 MMHG | HEART RATE: 88 BPM

## 2019-09-11 DIAGNOSIS — D46.C MDS (MYELODYSPLASTIC SYNDROME) WITH 5Q DELETION: Primary | ICD-10-CM

## 2019-09-11 PROCEDURE — 63600175 PHARM REV CODE 636 W HCPCS: Mod: JG,HCNC | Performed by: INTERNAL MEDICINE

## 2019-09-11 PROCEDURE — 96401 CHEMO ANTI-NEOPL SQ/IM: CPT | Mod: HCNC

## 2019-09-11 RX ORDER — AZACITIDINE 100 MG/1
75 INJECTION, POWDER, LYOPHILIZED, FOR SOLUTION INTRAVENOUS; SUBCUTANEOUS
Status: COMPLETED | OUTPATIENT
Start: 2019-09-11 | End: 2019-09-11

## 2019-09-11 RX ADMIN — AZACITIDINE 150 MG: 100 INJECTION, POWDER, LYOPHILIZED, FOR SOLUTION INTRAVENOUS; SUBCUTANEOUS at 12:09

## 2019-09-12 ENCOUNTER — INFUSION (OUTPATIENT)
Dept: INFUSION THERAPY | Facility: HOSPITAL | Age: 69
End: 2019-09-12
Attending: INTERNAL MEDICINE
Payer: MEDICARE

## 2019-09-12 VITALS
RESPIRATION RATE: 18 BRPM | SYSTOLIC BLOOD PRESSURE: 119 MMHG | TEMPERATURE: 99 F | HEART RATE: 91 BPM | DIASTOLIC BLOOD PRESSURE: 59 MMHG

## 2019-09-12 DIAGNOSIS — D46.C MDS (MYELODYSPLASTIC SYNDROME) WITH 5Q DELETION: Primary | ICD-10-CM

## 2019-09-12 PROCEDURE — 96401 CHEMO ANTI-NEOPL SQ/IM: CPT | Mod: HCNC

## 2019-09-12 PROCEDURE — 63600175 PHARM REV CODE 636 W HCPCS: Mod: JG,HCNC | Performed by: INTERNAL MEDICINE

## 2019-09-12 RX ORDER — ONDANSETRON 2 MG/ML
8 INJECTION INTRAMUSCULAR; INTRAVENOUS
Status: DISCONTINUED | OUTPATIENT
Start: 2019-09-12 | End: 2019-09-12 | Stop reason: HOSPADM

## 2019-09-12 RX ORDER — AZACITIDINE 100 MG/1
75 INJECTION, POWDER, LYOPHILIZED, FOR SOLUTION INTRAVENOUS; SUBCUTANEOUS
Status: COMPLETED | OUTPATIENT
Start: 2019-09-12 | End: 2019-09-12

## 2019-09-12 RX ADMIN — AZACITIDINE 150 MG: 100 INJECTION, POWDER, LYOPHILIZED, FOR SOLUTION INTRAVENOUS; SUBCUTANEOUS at 02:09

## 2019-09-13 ENCOUNTER — INFUSION (OUTPATIENT)
Dept: INFUSION THERAPY | Facility: HOSPITAL | Age: 69
End: 2019-09-13
Attending: INTERNAL MEDICINE
Payer: MEDICARE

## 2019-09-13 VITALS — TEMPERATURE: 98 F | HEART RATE: 101 BPM | SYSTOLIC BLOOD PRESSURE: 142 MMHG | DIASTOLIC BLOOD PRESSURE: 63 MMHG

## 2019-09-13 DIAGNOSIS — D46.C MDS (MYELODYSPLASTIC SYNDROME) WITH 5Q DELETION: Primary | ICD-10-CM

## 2019-09-13 PROCEDURE — 96401 CHEMO ANTI-NEOPL SQ/IM: CPT | Mod: HCNC

## 2019-09-13 PROCEDURE — 63600175 PHARM REV CODE 636 W HCPCS: Mod: JG,HCNC | Performed by: INTERNAL MEDICINE

## 2019-09-13 RX ORDER — ONDANSETRON 2 MG/ML
8 INJECTION INTRAMUSCULAR; INTRAVENOUS
Status: DISCONTINUED | OUTPATIENT
Start: 2019-09-13 | End: 2019-09-13

## 2019-09-13 RX ORDER — ONDANSETRON 2 MG/ML
8 INJECTION INTRAMUSCULAR; INTRAVENOUS
Status: DISCONTINUED | OUTPATIENT
Start: 2019-09-13 | End: 2019-09-13 | Stop reason: HOSPADM

## 2019-09-13 RX ORDER — AZACITIDINE 100 MG/1
75 INJECTION, POWDER, LYOPHILIZED, FOR SOLUTION INTRAVENOUS; SUBCUTANEOUS
Status: COMPLETED | OUTPATIENT
Start: 2019-09-13 | End: 2019-09-13

## 2019-09-13 RX ADMIN — AZACITIDINE 150 MG: 100 INJECTION, POWDER, LYOPHILIZED, FOR SOLUTION INTRAVENOUS; SUBCUTANEOUS at 02:09

## 2019-09-13 NOTE — NURSING
Patient tolerated Vidaza SQ injection with no complications. VSS. Pt instructed to call MD with any problems. NAD. Pt discharged independently.

## 2019-09-16 ENCOUNTER — TELEPHONE (OUTPATIENT)
Dept: INFUSION THERAPY | Facility: HOSPITAL | Age: 69
End: 2019-09-16

## 2019-09-23 ENCOUNTER — PATIENT MESSAGE (OUTPATIENT)
Dept: HEMATOLOGY/ONCOLOGY | Facility: CLINIC | Age: 69
End: 2019-09-23

## 2019-09-23 ENCOUNTER — INFUSION (OUTPATIENT)
Dept: INFUSION THERAPY | Facility: HOSPITAL | Age: 69
End: 2019-09-23
Attending: INTERNAL MEDICINE
Payer: MEDICARE

## 2019-09-23 VITALS
HEART RATE: 80 BPM | RESPIRATION RATE: 18 BRPM | TEMPERATURE: 98 F | DIASTOLIC BLOOD PRESSURE: 62 MMHG | SYSTOLIC BLOOD PRESSURE: 142 MMHG

## 2019-09-23 DIAGNOSIS — D63.0 ANEMIA IN NEOPLASTIC DISEASE: Primary | ICD-10-CM

## 2019-09-23 DIAGNOSIS — D63.0 ANEMIA IN NEOPLASTIC DISEASE: ICD-10-CM

## 2019-09-23 PROCEDURE — 86920 COMPATIBILITY TEST SPIN: CPT

## 2019-09-23 PROCEDURE — 63600175 PHARM REV CODE 636 W HCPCS: Mod: HCNC | Performed by: NURSE PRACTITIONER

## 2019-09-23 PROCEDURE — P9040 RBC LEUKOREDUCED IRRADIATED: HCPCS

## 2019-09-23 PROCEDURE — 25000003 PHARM REV CODE 250: Mod: HCNC | Performed by: NURSE PRACTITIONER

## 2019-09-23 PROCEDURE — 36430 TRANSFUSION BLD/BLD COMPNT: CPT | Mod: HCNC

## 2019-09-23 RX ORDER — HYDROCODONE BITARTRATE AND ACETAMINOPHEN 500; 5 MG/1; MG/1
TABLET ORAL ONCE
Status: COMPLETED | OUTPATIENT
Start: 2019-09-23 | End: 2019-09-23

## 2019-09-23 RX ORDER — ACETAMINOPHEN 325 MG/1
650 TABLET ORAL
Status: COMPLETED | OUTPATIENT
Start: 2019-09-23 | End: 2019-09-23

## 2019-09-23 RX ORDER — DIPHENHYDRAMINE HCL 25 MG
25 CAPSULE ORAL
Status: COMPLETED | OUTPATIENT
Start: 2019-09-23 | End: 2019-09-23

## 2019-09-23 RX ORDER — DIPHENHYDRAMINE HCL 25 MG
25 CAPSULE ORAL
Status: CANCELLED | OUTPATIENT
Start: 2019-09-23

## 2019-09-23 RX ORDER — ACETAMINOPHEN 325 MG/1
650 TABLET ORAL
Status: CANCELLED | OUTPATIENT
Start: 2019-09-23

## 2019-09-23 RX ORDER — HYDROCODONE BITARTRATE AND ACETAMINOPHEN 500; 5 MG/1; MG/1
TABLET ORAL ONCE
Status: CANCELLED | OUTPATIENT
Start: 2019-09-23 | End: 2019-09-23

## 2019-09-23 RX ADMIN — DIPHENHYDRAMINE HYDROCHLORIDE 25 MG: 25 CAPSULE ORAL at 02:09

## 2019-09-23 RX ADMIN — ACETAMINOPHEN 650 MG: 325 TABLET ORAL at 02:09

## 2019-09-23 RX ADMIN — SODIUM CHLORIDE: 0.9 INJECTION, SOLUTION INTRAVENOUS at 02:09

## 2019-09-23 NOTE — NURSING
1355  (pt scheduled for 1100)  Pt here for 1 unit PRBC, no new complaints or concerns; pt reports prior transfusions, tolerated well w/ no reaction; discussed infusion, possible reaction, s/s of same and what to report to RN; consent reviewed/signed; all pt questions answered and pt agrees to proceed

## 2019-09-23 NOTE — PLAN OF CARE
1720  Transfusion completed, pt tolerated well; reviewed s/s of post-transfusion reaction, when to contact MD, when to report to ER; AVS given to and reviewed with pt, pt verbalized understanding of all discussed and when to report next

## 2019-09-30 ENCOUNTER — TELEPHONE (OUTPATIENT)
Dept: HEMATOLOGY/ONCOLOGY | Facility: CLINIC | Age: 69
End: 2019-09-30

## 2019-10-04 ENCOUNTER — TELEPHONE (OUTPATIENT)
Dept: HEMATOLOGY/ONCOLOGY | Facility: CLINIC | Age: 69
End: 2019-10-04

## 2019-10-07 ENCOUNTER — OFFICE VISIT (OUTPATIENT)
Dept: HEMATOLOGY/ONCOLOGY | Facility: CLINIC | Age: 69
End: 2019-10-07
Payer: MEDICARE

## 2019-10-07 ENCOUNTER — INFUSION (OUTPATIENT)
Dept: INFUSION THERAPY | Facility: HOSPITAL | Age: 69
End: 2019-10-07
Attending: INTERNAL MEDICINE
Payer: MEDICARE

## 2019-10-07 ENCOUNTER — LAB VISIT (OUTPATIENT)
Dept: LAB | Facility: HOSPITAL | Age: 69
End: 2019-10-07
Attending: INTERNAL MEDICINE
Payer: MEDICARE

## 2019-10-07 VITALS
SYSTOLIC BLOOD PRESSURE: 158 MMHG | TEMPERATURE: 98 F | BODY MASS INDEX: 31.63 KG/M2 | WEIGHT: 189.81 LBS | DIASTOLIC BLOOD PRESSURE: 73 MMHG | OXYGEN SATURATION: 97 % | HEART RATE: 88 BPM | HEIGHT: 65 IN

## 2019-10-07 VITALS
TEMPERATURE: 98 F | SYSTOLIC BLOOD PRESSURE: 156 MMHG | OXYGEN SATURATION: 96 % | RESPIRATION RATE: 18 BRPM | HEART RATE: 70 BPM | DIASTOLIC BLOOD PRESSURE: 84 MMHG

## 2019-10-07 DIAGNOSIS — D46.9 MDS (MYELODYSPLASTIC SYNDROME): Primary | ICD-10-CM

## 2019-10-07 DIAGNOSIS — D64.9 ANEMIA REQUIRING TRANSFUSIONS: ICD-10-CM

## 2019-10-07 DIAGNOSIS — D46.C MDS (MYELODYSPLASTIC SYNDROME) WITH 5Q DELETION: ICD-10-CM

## 2019-10-07 DIAGNOSIS — D61.818 PANCYTOPENIA: ICD-10-CM

## 2019-10-07 DIAGNOSIS — D46.C MDS (MYELODYSPLASTIC SYNDROME) WITH 5Q DELETION: Primary | ICD-10-CM

## 2019-10-07 DIAGNOSIS — N18.30 STAGE 3 CHRONIC KIDNEY DISEASE: ICD-10-CM

## 2019-10-07 DIAGNOSIS — D53.9 MACROCYTIC ANEMIA: ICD-10-CM

## 2019-10-07 LAB
ABO + RH BLD: NORMAL
ALBUMIN SERPL BCP-MCNC: 3.7 G/DL (ref 3.5–5.2)
ALP SERPL-CCNC: 68 U/L (ref 55–135)
ALT SERPL W/O P-5'-P-CCNC: 47 U/L (ref 10–44)
ANION GAP SERPL CALC-SCNC: 11 MMOL/L (ref 8–16)
ANISOCYTOSIS BLD QL SMEAR: SLIGHT
AST SERPL-CCNC: 23 U/L (ref 10–40)
BASOPHILS # BLD AUTO: ABNORMAL K/UL (ref 0–0.2)
BASOPHILS NFR BLD: 0 % (ref 0–1.9)
BILIRUB SERPL-MCNC: 0.3 MG/DL (ref 0.1–1)
BLD GP AB SCN CELLS X3 SERPL QL: NORMAL
BLD PROD TYP BPU: NORMAL
BLOOD UNIT EXPIRATION DATE: NORMAL
BLOOD UNIT TYPE CODE: 6200
BLOOD UNIT TYPE: NORMAL
BUN SERPL-MCNC: 26 MG/DL (ref 8–23)
CALCIUM SERPL-MCNC: 9.3 MG/DL (ref 8.7–10.5)
CHLORIDE SERPL-SCNC: 106 MMOL/L (ref 95–110)
CO2 SERPL-SCNC: 26 MMOL/L (ref 23–29)
CODING SYSTEM: NORMAL
CREAT SERPL-MCNC: 1 MG/DL (ref 0.5–1.4)
DIFFERENTIAL METHOD: ABNORMAL
DISPENSE STATUS: NORMAL
EOSINOPHIL # BLD AUTO: ABNORMAL K/UL (ref 0–0.5)
EOSINOPHIL NFR BLD: 4 % (ref 0–8)
ERYTHROCYTE [DISTWIDTH] IN BLOOD BY AUTOMATED COUNT: 19.3 % (ref 11.5–14.5)
EST. GFR  (AFRICAN AMERICAN): >60 ML/MIN/1.73 M^2
EST. GFR  (NON AFRICAN AMERICAN): 57.6 ML/MIN/1.73 M^2
GLUCOSE SERPL-MCNC: 108 MG/DL (ref 70–110)
HCT VFR BLD AUTO: 20 % (ref 37–48.5)
HGB BLD-MCNC: 6.3 G/DL (ref 12–16)
HYPOCHROMIA BLD QL SMEAR: ABNORMAL
IMM GRANULOCYTES # BLD AUTO: ABNORMAL K/UL (ref 0–0.04)
IMM GRANULOCYTES NFR BLD AUTO: ABNORMAL % (ref 0–0.5)
LYMPHOCYTES # BLD AUTO: ABNORMAL K/UL (ref 1–4.8)
LYMPHOCYTES NFR BLD: 63 % (ref 18–48)
MCH RBC QN AUTO: 30.9 PG (ref 27–31)
MCHC RBC AUTO-ENTMCNC: 31.5 G/DL (ref 32–36)
MCV RBC AUTO: 98 FL (ref 82–98)
MONOCYTES # BLD AUTO: ABNORMAL K/UL (ref 0.3–1)
MONOCYTES NFR BLD: 6 % (ref 4–15)
NEUTROPHILS # BLD AUTO: ABNORMAL K/UL (ref 1.8–7.7)
NEUTROPHILS NFR BLD: 27 % (ref 38–73)
NRBC BLD-RTO: 0 /100 WBC
NUM UNITS TRANS PACKED RBC: NORMAL
OVALOCYTES BLD QL SMEAR: ABNORMAL
PLATELET # BLD AUTO: 178 K/UL (ref 150–350)
PMV BLD AUTO: 11.3 FL (ref 9.2–12.9)
POIKILOCYTOSIS BLD QL SMEAR: SLIGHT
POLYCHROMASIA BLD QL SMEAR: ABNORMAL
POTASSIUM SERPL-SCNC: 3.8 MMOL/L (ref 3.5–5.1)
PROT SERPL-MCNC: 6.7 G/DL (ref 6–8.4)
RBC # BLD AUTO: 2.04 M/UL (ref 4–5.4)
SODIUM SERPL-SCNC: 143 MMOL/L (ref 136–145)
WBC # BLD AUTO: 1.33 K/UL (ref 3.9–12.7)

## 2019-10-07 PROCEDURE — 3077F SYST BP >= 140 MM HG: CPT | Mod: HCNC,CPTII,S$GLB, | Performed by: INTERNAL MEDICINE

## 2019-10-07 PROCEDURE — 36430 TRANSFUSION BLD/BLD COMPNT: CPT | Mod: HCNC

## 2019-10-07 PROCEDURE — 85007 BL SMEAR W/DIFF WBC COUNT: CPT | Mod: HCNC

## 2019-10-07 PROCEDURE — 63600175 PHARM REV CODE 636 W HCPCS: Mod: JG,HCNC | Performed by: INTERNAL MEDICINE

## 2019-10-07 PROCEDURE — 3078F PR MOST RECENT DIASTOLIC BLOOD PRESSURE < 80 MM HG: ICD-10-PCS | Mod: HCNC,CPTII,S$GLB, | Performed by: INTERNAL MEDICINE

## 2019-10-07 PROCEDURE — 3077F PR MOST RECENT SYSTOLIC BLOOD PRESSURE >= 140 MM HG: ICD-10-PCS | Mod: HCNC,CPTII,S$GLB, | Performed by: INTERNAL MEDICINE

## 2019-10-07 PROCEDURE — 99215 PR OFFICE/OUTPT VISIT, EST, LEVL V, 40-54 MIN: ICD-10-PCS | Mod: HCNC,GC,S$GLB, | Performed by: INTERNAL MEDICINE

## 2019-10-07 PROCEDURE — 3078F DIAST BP <80 MM HG: CPT | Mod: HCNC,CPTII,S$GLB, | Performed by: INTERNAL MEDICINE

## 2019-10-07 PROCEDURE — 86920 COMPATIBILITY TEST SPIN: CPT | Mod: HCNC

## 2019-10-07 PROCEDURE — 86850 RBC ANTIBODY SCREEN: CPT | Mod: HCNC

## 2019-10-07 PROCEDURE — P9040 RBC LEUKOREDUCED IRRADIATED: HCPCS | Mod: HCNC

## 2019-10-07 PROCEDURE — 99999 PR PBB SHADOW E&M-EST. PATIENT-LVL III: ICD-10-PCS | Mod: PBBFAC,HCNC,, | Performed by: INTERNAL MEDICINE

## 2019-10-07 PROCEDURE — 1101F PT FALLS ASSESS-DOCD LE1/YR: CPT | Mod: HCNC,CPTII,S$GLB, | Performed by: INTERNAL MEDICINE

## 2019-10-07 PROCEDURE — 25000003 PHARM REV CODE 250: Mod: HCNC | Performed by: INTERNAL MEDICINE

## 2019-10-07 PROCEDURE — 99215 OFFICE O/P EST HI 40 MIN: CPT | Mod: HCNC,GC,S$GLB, | Performed by: INTERNAL MEDICINE

## 2019-10-07 PROCEDURE — 1101F PR PT FALLS ASSESS DOC 0-1 FALLS W/OUT INJ PAST YR: ICD-10-PCS | Mod: HCNC,CPTII,S$GLB, | Performed by: INTERNAL MEDICINE

## 2019-10-07 PROCEDURE — 80053 COMPREHEN METABOLIC PANEL: CPT | Mod: HCNC

## 2019-10-07 PROCEDURE — 96401 CHEMO ANTI-NEOPL SQ/IM: CPT | Mod: HCNC

## 2019-10-07 PROCEDURE — 85027 COMPLETE CBC AUTOMATED: CPT | Mod: HCNC

## 2019-10-07 PROCEDURE — 99999 PR PBB SHADOW E&M-EST. PATIENT-LVL III: CPT | Mod: PBBFAC,HCNC,, | Performed by: INTERNAL MEDICINE

## 2019-10-07 RX ORDER — ONDANSETRON 2 MG/ML
8 INJECTION INTRAMUSCULAR; INTRAVENOUS
Status: DISCONTINUED | OUTPATIENT
Start: 2019-10-07 | End: 2019-10-07 | Stop reason: HOSPADM

## 2019-10-07 RX ORDER — AZACITIDINE 100 MG/1
75 INJECTION, POWDER, LYOPHILIZED, FOR SOLUTION INTRAVENOUS; SUBCUTANEOUS
Status: CANCELLED | OUTPATIENT
Start: 2019-10-07

## 2019-10-07 RX ORDER — AZACITIDINE 100 MG/1
75 INJECTION, POWDER, LYOPHILIZED, FOR SOLUTION INTRAVENOUS; SUBCUTANEOUS
Status: CANCELLED | OUTPATIENT
Start: 2019-10-10

## 2019-10-07 RX ORDER — ONDANSETRON 2 MG/ML
8 INJECTION INTRAMUSCULAR; INTRAVENOUS
Status: CANCELLED
Start: 2019-10-10

## 2019-10-07 RX ORDER — ONDANSETRON 2 MG/ML
8 INJECTION INTRAMUSCULAR; INTRAVENOUS
Status: CANCELLED
Start: 2019-10-09

## 2019-10-07 RX ORDER — AZACITIDINE 100 MG/1
75 INJECTION, POWDER, LYOPHILIZED, FOR SOLUTION INTRAVENOUS; SUBCUTANEOUS
Status: COMPLETED | OUTPATIENT
Start: 2019-10-07 | End: 2019-10-07

## 2019-10-07 RX ORDER — ACETAMINOPHEN 325 MG/1
650 TABLET ORAL
Status: COMPLETED | OUTPATIENT
Start: 2019-10-07 | End: 2019-10-07

## 2019-10-07 RX ORDER — DIPHENHYDRAMINE HCL 25 MG
25 CAPSULE ORAL
Status: CANCELLED | OUTPATIENT
Start: 2019-10-07

## 2019-10-07 RX ORDER — ONDANSETRON 2 MG/ML
8 INJECTION INTRAMUSCULAR; INTRAVENOUS
Status: CANCELLED
Start: 2019-10-08

## 2019-10-07 RX ORDER — DIPHENHYDRAMINE HCL 25 MG
25 CAPSULE ORAL
Status: COMPLETED | OUTPATIENT
Start: 2019-10-07 | End: 2019-10-07

## 2019-10-07 RX ORDER — AZACITIDINE 100 MG/1
75 INJECTION, POWDER, LYOPHILIZED, FOR SOLUTION INTRAVENOUS; SUBCUTANEOUS
Status: CANCELLED | OUTPATIENT
Start: 2019-10-09

## 2019-10-07 RX ORDER — ACETAMINOPHEN 325 MG/1
650 TABLET ORAL
Status: CANCELLED | OUTPATIENT
Start: 2019-10-07

## 2019-10-07 RX ORDER — ONDANSETRON 2 MG/ML
8 INJECTION INTRAMUSCULAR; INTRAVENOUS
Status: CANCELLED
Start: 2019-10-07

## 2019-10-07 RX ORDER — ONDANSETRON 8 MG/1
TABLET, ORALLY DISINTEGRATING ORAL
Qty: 30 TABLET | Refills: 6 | Status: SHIPPED | OUTPATIENT
Start: 2019-10-07 | End: 2020-03-09 | Stop reason: SDUPTHER

## 2019-10-07 RX ORDER — AZACITIDINE 100 MG/1
75 INJECTION, POWDER, LYOPHILIZED, FOR SOLUTION INTRAVENOUS; SUBCUTANEOUS
Status: CANCELLED | OUTPATIENT
Start: 2019-10-08

## 2019-10-07 RX ORDER — ACYCLOVIR 400 MG/1
400 TABLET ORAL 2 TIMES DAILY
Qty: 60 TABLET | Refills: 6 | Status: SHIPPED | OUTPATIENT
Start: 2019-10-07 | End: 2020-07-20 | Stop reason: SDUPTHER

## 2019-10-07 RX ORDER — HYDROCODONE BITARTRATE AND ACETAMINOPHEN 500; 5 MG/1; MG/1
TABLET ORAL ONCE
Status: COMPLETED | OUTPATIENT
Start: 2019-10-07 | End: 2019-10-07

## 2019-10-07 RX ORDER — HYDROCODONE BITARTRATE AND ACETAMINOPHEN 500; 5 MG/1; MG/1
TABLET ORAL ONCE
Status: CANCELLED | OUTPATIENT
Start: 2019-10-07 | End: 2019-10-07

## 2019-10-07 RX ORDER — ONDANSETRON 2 MG/ML
8 INJECTION INTRAMUSCULAR; INTRAVENOUS
Status: CANCELLED
Start: 2019-10-11

## 2019-10-07 RX ORDER — AZACITIDINE 100 MG/1
75 INJECTION, POWDER, LYOPHILIZED, FOR SOLUTION INTRAVENOUS; SUBCUTANEOUS
Status: CANCELLED | OUTPATIENT
Start: 2019-10-11

## 2019-10-07 RX ADMIN — DIPHENHYDRAMINE HYDROCHLORIDE 25 MG: 25 CAPSULE ORAL at 12:10

## 2019-10-07 RX ADMIN — ACETAMINOPHEN 650 MG: 325 TABLET ORAL at 12:10

## 2019-10-07 RX ADMIN — SODIUM CHLORIDE: 9 INJECTION, SOLUTION INTRAVENOUS at 12:10

## 2019-10-07 RX ADMIN — AZACITIDINE 150 MG: 100 INJECTION, POWDER, LYOPHILIZED, FOR SOLUTION INTRAVENOUS; SUBCUTANEOUS at 02:10

## 2019-10-07 NOTE — H&P (VIEW-ONLY)
Subjective:       Patient ID: uSsan Puente is a 69 y.o. female.    Chief Complaint: MDS (myelodysplastic syndrome) with 5q deletion    HPI: Patient presents today for follow up of her history of MDS 5 q del syndrome prior to cycle 6 Vidaza as third line therapy for 5q minus syndrome. Will receive blood today for hgb 6.3 gm/dl. Having some leg and back pain, taking tylenol. No more diarrhea, now having some constipation. Denies fevers, chills, n/v/c, night sweats which require changing clothes, sob, chest pain. Presents to clinic alone.     Oncology History   Ms. Puente is a 68 year old female with hx of DM2, peripheral vascular disease, tobacco use, CAD, hyperlipidemia, hypertension who was hospitalized 11/10/16 for anemia. hgb 5.3  MCH 43.4 with normal WBC and platelets. Patient had normal iron stores. She was transfused 3 units of PRBCs and discharged home with hgb 8.7 on 11/11/16. She was referred for further evalutation of her anemia. On 12/16/16 patient had a normal SPEP and immunofixation. Slightly elevated kappa light chains with normal ration. Elevated vitamin b12, normal folate, JAYDEN was positive with a low titer and negative profile. Patient had a bone marrow biopsy 1/5/16 which showed the core biopsy is normocellular for age (40%); however, megakaryocytes are increased and show frequent small, hypolobated forms. Additionally, a subset of the neutrophils are hypogranular. Blasts are not increased by either morphology (1.2%) or in the corresponding flow cytometric analysis. Fish detects a 5q deletion in 54.5% of nuclei. Cytogenetics reported 20 metaphases, 2 metaphases were normal and 18 metaphases had a 5q deletion. No additional cytogenetic abnormalities were detected. Findings consistent with 5q deletion syndrome.     Patient had a delay in obtaining Revlimid 10 mg daily but did start taking the medication 2/8/17. On 2/9/17 patient developed a diffuse maculopapular rash throughout scalp  arms, legs and torso. She states she had no stridor or wheezing. She discontinued medication 2/15/17. Went to allergist who provided references on desensitization so that patient could resume Revlimid. Unfortunately developed pancytopenia and Revlimid stopped 6/16/17. She had a repeat BMBX  performed 6/8/17 and showed a hypercellular marrow (60%) continued atypia in the granulocytes and megakaryocytes were noted.  Additionally, there is erythroid atypia present. No increase in blasts. Cytogenetics are normal and MDS FISH is negative, failing to show 5 q minus. NGS should no significant molecular mutations.  Anemia work up revealed bienvenido negative hemolysis.    Revlimid has been stopped since June 2017 after she developed pancytopenia while on Revlimid (required desensitization). CBC was normal for almost 1 year and marrow was negative for del5q. Bone marrow biopsy repeat from 6/2018 showed relapsed 5q minus MDS without new or additional mutations. Revlimid restarted at 2.5 mg daily with prednisone to prevent allergic reaction. Titrated to a goal of 10mg daily Revlimid. Hospital admission 3/12-3/17/19 for unresponsive event after blood transfusion, found to be profoundly pancytopenic at admission. Since hospital admission Revlimid has been stopped. Repeat marrow March 2019 shows persistent MDS with 5q minus.     Review of Systems   Constitutional: Negative for fever, chills, weight loss, fatigue.   HENT: Negative for sinus congestion or rhinorrhea. Negative for ear pain, mouth sores, nosebleeds and trouble swallowing.    Respiratory: Negative for cough, shortness of breath and wheezing.    Cardiovascular: Negative for chest pain and leg swelling.   Gastrointestinal: + constipation Negative for abdominal distention, abdominal pain, blood in stool, diarrhea, nausea and vomiting.   Endocrine: Negative for polyphagia and polyuria.   Genitourinary: Negative for dysuria, hematuria and urgency.   Musculoskeletal: Positive  for arthralgias. Negative for myalgias.   Skin: Negative for color change, pallor and rash.   Neurological: Negative for dizziness, weakness, light-headedness and headaches.   Hematological: Negative for adenopathy. Does not bruise/bleed easily.   Psychiatric/Behavioral: Negative for agitation and behavioral problems.       Objective:      Physical Exam   Constitutional: She is oriented to person, place, and time. She appears well-developed and well-nourished.   Mouth/Throat: Oropharynx is clear and moist. No oropharyngeal exudate.   Eyes: Conjunctivae and EOM are normal. Right eye exhibits no discharge. Left eye exhibits no discharge.   Neck: Normal range of motion. Neck supple.   Cardiovascular: Normal rate, regular rhythm, normal heart sounds and intact distal pulses.   No murmur heard.  Pulmonary/Chest: Effort normal and breath sounds normal. No respiratory distress. She has no wheezes.   Abdominal: Soft. Bowel sounds are normal. She exhibits no distension and no mass. There is no tenderness.   Musculoskeletal: Normal range of motion. She exhibits no edema.   Neurological: She is alert and oriented to person, place, and time.   Skin: Skin is warm and dry. No rash noted.   Psychiatric: She has a normal mood and affect. Her behavior is normal. Judgment and thought content normal.   Nursing note and vitals reviewed.      Assessment:       1. MDS (myelodysplastic syndrome) with 5q deletion    2. Macrocytic anemia    3. Anemia requiring transfusions    4. Stage 3 chronic kidney disease        Plan:     MDS  - Initially with 5 q minus syndrome and treated with Revlimid. Developed allergy and was then desensitized. Soon after developed pancytopenia and Revlimid held since June 2017.  - BMBX on 6/8/17 was with normal cytogenetics. 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 Discussed treatment options of HMA therapy or repeat trial of Revlimid and patient wished to proceed with Revlimid   - Revlimid stopped  again due to repeat allergic reaction and pancytopenia 3/2019  - Started cycle 1 Vidaza 5/6/19 subq for 5 days every 28 days  - Tolerated cycles 1-5 well. Package insert for Vidaza recommends holding if ANC <1000, however pt has been receiving this with an ANC below 1000 for each cycle. Will proceed with C6 today as she has been receiving this    Cytopenias 2/2 disease: anemia and leukopenia  - Transfuse for hgb < 7 or plts < 10K  - WBC 1.33; hgb 6.3; plt normal;   - Transfuse 1 unit prbc at 3 pm today in infusion center  - Continue ppx cipro, acyclovir, and fluconazole; refills sent today     Stage 3 CKD  - stable; continue to monitor with visits    DM2  - management per PCP  - continue metformin    HTN  - management per PCP  - continue lisinopril-HCTZ    CAD  - continue praluent for HLD per PCP (intolerant to statins)  - continue ASA    Anxiety/Depression  - Improved mood on Celexa. Continue    Dehydration hx  - continue to encourage increased oral fluid intake    F/U  - 1 unit prbcs in infusion center today  - CBC and type and screen weekly for possible blood product transfusions, on Mondays   - Return visit with CBC, type and screen, CMP and appt with Dr. Valdes or NP on 11/4/2019. Chemo chair for cycle 7 vidaza 11/4/19 - 11/8/19  - BM biopsy in OR on 10/24    The following was staffed and discussed with supervising physician Dr. Valdes.    Inés Lugo MD PGY-V  Hematology/Oncology Fellow

## 2019-10-07 NOTE — PROGRESS NOTES
Subjective:       Patient ID: Susan Puente is a 69 y.o. female.    Chief Complaint: MDS (myelodysplastic syndrome) with 5q deletion    HPI: Patient presents today for follow up of her history of MDS 5 q del syndrome prior to cycle 6 Vidaza as third line therapy for 5q minus syndrome. Will receive blood today for hgb 6.3 gm/dl. Having some leg and back pain, taking tylenol. No more diarrhea, now having some constipation. Denies fevers, chills, n/v/c, night sweats which require changing clothes, sob, chest pain. Presents to clinic alone.     Oncology History   Ms. Puente is a 68 year old female with hx of DM2, peripheral vascular disease, tobacco use, CAD, hyperlipidemia, hypertension who was hospitalized 11/10/16 for anemia. hgb 5.3  MCH 43.4 with normal WBC and platelets. Patient had normal iron stores. She was transfused 3 units of PRBCs and discharged home with hgb 8.7 on 11/11/16. She was referred for further evalutation of her anemia. On 12/16/16 patient had a normal SPEP and immunofixation. Slightly elevated kappa light chains with normal ration. Elevated vitamin b12, normal folate, JAYDEN was positive with a low titer and negative profile. Patient had a bone marrow biopsy 1/5/16 which showed the core biopsy is normocellular for age (40%); however, megakaryocytes are increased and show frequent small, hypolobated forms. Additionally, a subset of the neutrophils are hypogranular. Blasts are not increased by either morphology (1.2%) or in the corresponding flow cytometric analysis. Fish detects a 5q deletion in 54.5% of nuclei. Cytogenetics reported 20 metaphases, 2 metaphases were normal and 18 metaphases had a 5q deletion. No additional cytogenetic abnormalities were detected. Findings consistent with 5q deletion syndrome.     Patient had a delay in obtaining Revlimid 10 mg daily but did start taking the medication 2/8/17. On 2/9/17 patient developed a diffuse maculopapular rash throughout scalp  arms, legs and torso. She states she had no stridor or wheezing. She discontinued medication 2/15/17. Went to allergist who provided references on desensitization so that patient could resume Revlimid. Unfortunately developed pancytopenia and Revlimid stopped 6/16/17. She had a repeat BMBX  performed 6/8/17 and showed a hypercellular marrow (60%) continued atypia in the granulocytes and megakaryocytes were noted.  Additionally, there is erythroid atypia present. No increase in blasts. Cytogenetics are normal and MDS FISH is negative, failing to show 5 q minus. NGS should no significant molecular mutations.  Anemia work up revealed bienvenido negative hemolysis.    Revlimid has been stopped since June 2017 after she developed pancytopenia while on Revlimid (required desensitization). CBC was normal for almost 1 year and marrow was negative for del5q. Bone marrow biopsy repeat from 6/2018 showed relapsed 5q minus MDS without new or additional mutations. Revlimid restarted at 2.5 mg daily with prednisone to prevent allergic reaction. Titrated to a goal of 10mg daily Revlimid. Hospital admission 3/12-3/17/19 for unresponsive event after blood transfusion, found to be profoundly pancytopenic at admission. Since hospital admission Revlimid has been stopped. Repeat marrow March 2019 shows persistent MDS with 5q minus.     Review of Systems   Constitutional: Negative for fever, chills, weight loss, fatigue.   HENT: Negative for sinus congestion or rhinorrhea. Negative for ear pain, mouth sores, nosebleeds and trouble swallowing.    Respiratory: Negative for cough, shortness of breath and wheezing.    Cardiovascular: Negative for chest pain and leg swelling.   Gastrointestinal: + constipation Negative for abdominal distention, abdominal pain, blood in stool, diarrhea, nausea and vomiting.   Endocrine: Negative for polyphagia and polyuria.   Genitourinary: Negative for dysuria, hematuria and urgency.   Musculoskeletal: Positive  for arthralgias. Negative for myalgias.   Skin: Negative for color change, pallor and rash.   Neurological: Negative for dizziness, weakness, light-headedness and headaches.   Hematological: Negative for adenopathy. Does not bruise/bleed easily.   Psychiatric/Behavioral: Negative for agitation and behavioral problems.       Objective:      Physical Exam   Constitutional: She is oriented to person, place, and time. She appears well-developed and well-nourished.   Mouth/Throat: Oropharynx is clear and moist. No oropharyngeal exudate.   Eyes: Conjunctivae and EOM are normal. Right eye exhibits no discharge. Left eye exhibits no discharge.   Neck: Normal range of motion. Neck supple.   Cardiovascular: Normal rate, regular rhythm, normal heart sounds and intact distal pulses.   No murmur heard.  Pulmonary/Chest: Effort normal and breath sounds normal. No respiratory distress. She has no wheezes.   Abdominal: Soft. Bowel sounds are normal. She exhibits no distension and no mass. There is no tenderness.   Musculoskeletal: Normal range of motion. She exhibits no edema.   Neurological: She is alert and oriented to person, place, and time.   Skin: Skin is warm and dry. No rash noted.   Psychiatric: She has a normal mood and affect. Her behavior is normal. Judgment and thought content normal.   Nursing note and vitals reviewed.      Assessment:       1. MDS (myelodysplastic syndrome) with 5q deletion    2. Macrocytic anemia    3. Anemia requiring transfusions    4. Stage 3 chronic kidney disease        Plan:     MDS  - Initially with 5 q minus syndrome and treated with Revlimid. Developed allergy and was then desensitized. Soon after developed pancytopenia and Revlimid held since June 2017.  - BMBX on 6/8/17 was with normal cytogenetics. 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 Discussed treatment options of HMA therapy or repeat trial of Revlimid and patient wished to proceed with Revlimid   - Revlimid stopped  again due to repeat allergic reaction and pancytopenia 3/2019  - Started cycle 1 Vidaza 5/6/19 subq for 5 days every 28 days  - Tolerated cycles 1-5 well. Package insert for Vidaza recommends holding if ANC <1000, however pt has been receiving this with an ANC below 1000 for each cycle. Will proceed with C6 today as she has been receiving this    Cytopenias 2/2 disease: anemia and leukopenia  - Transfuse for hgb < 7 or plts < 10K  - WBC 1.33; hgb 6.3; plt normal;   - Transfuse 1 unit prbc at 3 pm today in infusion center  - Continue ppx cipro, acyclovir, and fluconazole; refills sent today     Stage 3 CKD  - stable; continue to monitor with visits    DM2  - management per PCP  - continue metformin    HTN  - management per PCP  - continue lisinopril-HCTZ    CAD  - continue praluent for HLD per PCP (intolerant to statins)  - continue ASA    Anxiety/Depression  - Improved mood on Celexa. Continue    Dehydration hx  - continue to encourage increased oral fluid intake    F/U  - 1 unit prbcs in infusion center today  - CBC and type and screen weekly for possible blood product transfusions, on Mondays   - Return visit with CBC, type and screen, CMP and appt with Dr. Valdes or NP on 11/4/2019. Chemo chair for cycle 7 vidaza 11/4/19 - 11/8/19  - BM biopsy in OR on 10/24    The following was staffed and discussed with supervising physician Dr. Valdes.    Inés Lugo MD PGY-V  Hematology/Oncology Fellow

## 2019-10-07 NOTE — PLAN OF CARE
Pt tolerated 1 unit PRBCs well, with no complications or s/s of adverse reaction. VS stable throughout transfusion. Vidaza injections x 3 administered SQ to abdomen, pt tolerated well. Band-aids applied to site. Right forearm PIV positive for blood return, SL and removed prior to discharge. Catheter tip intact. Pt discharged with no distress noted, ambulating independently with walker. RTC 10/8/19, pt verbalized understanding. AVS printed and given to pt. Pt instructed to call MD if concerns arise.

## 2019-10-07 NOTE — PROGRESS NOTES
Case request for BMBx in OR placed.    Altagracia Cornejo, FNP  Hematology/Oncology/Bone Marrow Transplant

## 2019-10-07 NOTE — Clinical Note
- 1 unit prbcs in infusion center today- CBC and type and screen weekly for possible blood product transfusions, on Mondays - Return visit with CBC, type and screen, CMP and appt with Dr. Valdes or NP on 11/4/2019. Chemo chair for cycle 7 vidaza 11/4/19 - 11/8/19- BM biopsy in clinic on 10/29

## 2019-10-08 ENCOUNTER — INFUSION (OUTPATIENT)
Dept: INFUSION THERAPY | Facility: HOSPITAL | Age: 69
End: 2019-10-08
Attending: INTERNAL MEDICINE
Payer: MEDICARE

## 2019-10-08 VITALS — SYSTOLIC BLOOD PRESSURE: 184 MMHG | DIASTOLIC BLOOD PRESSURE: 71 MMHG | HEART RATE: 90 BPM

## 2019-10-08 DIAGNOSIS — D46.C MDS (MYELODYSPLASTIC SYNDROME) WITH 5Q DELETION: Primary | ICD-10-CM

## 2019-10-08 PROCEDURE — 63600175 PHARM REV CODE 636 W HCPCS: Mod: JG,HCNC | Performed by: INTERNAL MEDICINE

## 2019-10-08 PROCEDURE — 96401 CHEMO ANTI-NEOPL SQ/IM: CPT | Mod: HCNC

## 2019-10-08 RX ORDER — AZACITIDINE 100 MG/1
75 INJECTION, POWDER, LYOPHILIZED, FOR SOLUTION INTRAVENOUS; SUBCUTANEOUS
Status: COMPLETED | OUTPATIENT
Start: 2019-10-08 | End: 2019-10-08

## 2019-10-08 RX ORDER — ONDANSETRON 2 MG/ML
8 INJECTION INTRAMUSCULAR; INTRAVENOUS
Status: DISCONTINUED | OUTPATIENT
Start: 2019-10-08 | End: 2019-10-08 | Stop reason: HOSPADM

## 2019-10-08 RX ADMIN — AZACITIDINE 150 MG: 100 INJECTION, POWDER, LYOPHILIZED, FOR SOLUTION INTRAVENOUS; SUBCUTANEOUS at 02:10

## 2019-10-09 ENCOUNTER — INFUSION (OUTPATIENT)
Dept: INFUSION THERAPY | Facility: HOSPITAL | Age: 69
End: 2019-10-09
Attending: INTERNAL MEDICINE
Payer: MEDICARE

## 2019-10-09 ENCOUNTER — HOSPITAL ENCOUNTER (OUTPATIENT)
Dept: CARDIOLOGY | Facility: CLINIC | Age: 69
Discharge: HOME | End: 2019-10-09
Attending: INTERNAL MEDICINE
Payer: MEDICARE

## 2019-10-09 VITALS
BODY MASS INDEX: 24.99 KG/M2 | HEIGHT: 65 IN | WEIGHT: 150 LBS | HEART RATE: 68 BPM | SYSTOLIC BLOOD PRESSURE: 135 MMHG | DIASTOLIC BLOOD PRESSURE: 78 MMHG

## 2019-10-09 DIAGNOSIS — D46.C MDS (MYELODYSPLASTIC SYNDROME) WITH 5Q DELETION: Primary | ICD-10-CM

## 2019-10-09 DIAGNOSIS — I25.10 CORONARY ARTERY DISEASE, ANGINA PRESENCE UNSPECIFIED, UNSPECIFIED VESSEL OR LESION TYPE, UNSPECIFIED WHETHER NATIVE OR TRANSPLANTED HEART: ICD-10-CM

## 2019-10-09 DIAGNOSIS — Z95.820 S/P ANGIOPLASTY WITH STENT: ICD-10-CM

## 2019-10-09 LAB
ASCENDING AORTA: 2.87 CM
AV INDEX (PROSTH): 0.71
AV MEAN GRADIENT: 4 MMHG
AV PEAK GRADIENT: 7 MMHG
AV VALVE AREA: 2.98 CM2
AV VELOCITY RATIO: 0.85
BSA FOR ECHO PROCEDURE: 1.77 M2
CV ECHO LV RWT: 0.38 CM
DOP CALC AO PEAK VEL: 1.31 M/S
DOP CALC AO VTI: 26.02 CM
DOP CALC LVOT AREA: 4.2 CM2
DOP CALC LVOT DIAMETER: 2.31 CM
DOP CALC LVOT PEAK VEL: 1.11 M/S
DOP CALC LVOT STROKE VOLUME: 77.41 CM3
DOP CALCLVOT PEAK VEL VTI: 18.48 CM
E WAVE DECELERATION TIME: 127.2 MSEC
E/A RATIO: 0.73
E/E' RATIO: 11.54 M/S
ECHO LV POSTERIOR WALL: 0.94 CM (ref 0.6–1.1)
FRACTIONAL SHORTENING: 17 % (ref 28–44)
INTERVENTRICULAR SEPTUM: 0.99 CM (ref 0.6–1.1)
LA MAJOR: 4.79 CM
LA MINOR: 4.77 CM
LA WIDTH: 3.52 CM
LEFT ATRIUM SIZE: 3.77 CM
LEFT ATRIUM VOLUME INDEX: 30.8 ML/M2
LEFT ATRIUM VOLUME: 53.92 CM3
LEFT INTERNAL DIMENSION IN SYSTOLE: 4.08 CM (ref 2.1–4)
LEFT VENTRICLE DIASTOLIC VOLUME INDEX: 65.81 ML/M2
LEFT VENTRICLE DIASTOLIC VOLUME: 115.2 ML
LEFT VENTRICLE MASS INDEX: 97 G/M2
LEFT VENTRICLE SYSTOLIC VOLUME INDEX: 42 ML/M2
LEFT VENTRICLE SYSTOLIC VOLUME: 73.49 ML
LEFT VENTRICULAR INTERNAL DIMENSION IN DIASTOLE: 4.94 CM (ref 3.5–6)
LEFT VENTRICULAR MASS: 170.07 G
LV LATERAL E/E' RATIO: 9.38 M/S
LV SEPTAL E/E' RATIO: 15 M/S
MV PEAK A VEL: 1.03 M/S
MV PEAK E VEL: 0.75 M/S
PULM VEIN S/D RATIO: 1.36
PV PEAK D VEL: 0.42 M/S
PV PEAK S VEL: 0.57 M/S
RA MAJOR: 4.33 CM
RA PRESSURE: 8 MMHG
RA WIDTH: 3.65 CM
RIGHT VENTRICULAR END-DIASTOLIC DIMENSION: 3.5 CM
RV TISSUE DOPPLER FREE WALL SYSTOLIC VELOCITY 1 (APICAL 4 CHAMBER VIEW): 13.05 CM/S
SINUS: 2.69 CM
STJ: 2.87 CM
TDI LATERAL: 0.08 M/S
TDI SEPTAL: 0.05 M/S
TDI: 0.07 M/S
TRICUSPID ANNULAR PLANE SYSTOLIC EXCURSION: 2.09 CM

## 2019-10-09 PROCEDURE — 93306 TTE W/DOPPLER COMPLETE: CPT | Mod: HCNC

## 2019-10-09 PROCEDURE — 93306 TTE W/DOPPLER COMPLETE: CPT | Mod: 26,HCNC,, | Performed by: INTERNAL MEDICINE

## 2019-10-09 PROCEDURE — 96401 CHEMO ANTI-NEOPL SQ/IM: CPT | Mod: HCNC

## 2019-10-09 PROCEDURE — 93306 TRANSTHORACIC ECHO (TTE) COMPLETE (CUPID ONLY): ICD-10-PCS | Mod: 26,HCNC,, | Performed by: INTERNAL MEDICINE

## 2019-10-09 PROCEDURE — 63600175 PHARM REV CODE 636 W HCPCS: Mod: JG,HCNC | Performed by: INTERNAL MEDICINE

## 2019-10-09 RX ORDER — AZACITIDINE 100 MG/1
75 INJECTION, POWDER, LYOPHILIZED, FOR SOLUTION INTRAVENOUS; SUBCUTANEOUS
Status: COMPLETED | OUTPATIENT
Start: 2019-10-09 | End: 2019-10-09

## 2019-10-09 RX ADMIN — AZACITIDINE 150 MG: 100 INJECTION, POWDER, LYOPHILIZED, FOR SOLUTION INTRAVENOUS; SUBCUTANEOUS at 02:10

## 2019-10-10 ENCOUNTER — INFUSION (OUTPATIENT)
Dept: INFUSION THERAPY | Facility: HOSPITAL | Age: 69
End: 2019-10-10
Attending: INTERNAL MEDICINE
Payer: MEDICARE

## 2019-10-10 DIAGNOSIS — D46.C MDS (MYELODYSPLASTIC SYNDROME) WITH 5Q DELETION: Primary | ICD-10-CM

## 2019-10-10 PROCEDURE — 96401 CHEMO ANTI-NEOPL SQ/IM: CPT | Mod: HCNC

## 2019-10-10 PROCEDURE — 63600175 PHARM REV CODE 636 W HCPCS: Mod: JG,HCNC | Performed by: INTERNAL MEDICINE

## 2019-10-10 RX ORDER — AZACITIDINE 100 MG/1
75 INJECTION, POWDER, LYOPHILIZED, FOR SOLUTION INTRAVENOUS; SUBCUTANEOUS
Status: COMPLETED | OUTPATIENT
Start: 2019-10-10 | End: 2019-10-10

## 2019-10-10 RX ADMIN — AZACITIDINE 150 MG: 100 INJECTION, POWDER, LYOPHILIZED, FOR SOLUTION INTRAVENOUS; SUBCUTANEOUS at 02:10

## 2019-10-11 ENCOUNTER — INFUSION (OUTPATIENT)
Dept: INFUSION THERAPY | Facility: HOSPITAL | Age: 69
End: 2019-10-11
Attending: INTERNAL MEDICINE
Payer: MEDICARE

## 2019-10-11 VITALS
DIASTOLIC BLOOD PRESSURE: 66 MMHG | TEMPERATURE: 98 F | HEART RATE: 94 BPM | SYSTOLIC BLOOD PRESSURE: 148 MMHG | RESPIRATION RATE: 18 BRPM

## 2019-10-11 DIAGNOSIS — D46.C MDS (MYELODYSPLASTIC SYNDROME) WITH 5Q DELETION: Primary | ICD-10-CM

## 2019-10-11 PROCEDURE — 96401 CHEMO ANTI-NEOPL SQ/IM: CPT | Mod: HCNC

## 2019-10-11 PROCEDURE — 63600175 PHARM REV CODE 636 W HCPCS: Mod: JG,HCNC | Performed by: INTERNAL MEDICINE

## 2019-10-11 RX ORDER — AZACITIDINE 100 MG/1
75 INJECTION, POWDER, LYOPHILIZED, FOR SOLUTION INTRAVENOUS; SUBCUTANEOUS
Status: COMPLETED | OUTPATIENT
Start: 2019-10-11 | End: 2019-10-11

## 2019-10-11 RX ADMIN — AZACITIDINE 150 MG: 100 INJECTION, POWDER, LYOPHILIZED, FOR SOLUTION INTRAVENOUS; SUBCUTANEOUS at 02:10

## 2019-10-11 NOTE — NURSING
Patient received Vidaza SQ to ABD. In three syringes.  Tolerated well.  Issue with syringe not plunging as result of new hardware gave cause for additional stick to ABD. Patient understanding of situation.  REINA  VSS. Patient ambulated off unit with steady gait.

## 2019-10-14 ENCOUNTER — TELEPHONE (OUTPATIENT)
Dept: CARDIOLOGY | Facility: CLINIC | Age: 69
End: 2019-10-14

## 2019-10-14 NOTE — TELEPHONE ENCOUNTER
----- Message from Alina Bazan MD sent at 10/14/2019 10:56 AM CDT -----  plz let pt. Know that echo (ultrasound of the heart) is overall normal.no change in medications for now.

## 2019-10-14 NOTE — PROGRESS NOTES
plz let pt. Know that echo (ultrasound of the heart) is overall normal.no change in medications for now.

## 2019-10-21 ENCOUNTER — INFUSION (OUTPATIENT)
Dept: INFUSION THERAPY | Facility: HOSPITAL | Age: 69
End: 2019-10-21
Attending: INTERNAL MEDICINE
Payer: MEDICARE

## 2019-10-21 ENCOUNTER — TELEPHONE (OUTPATIENT)
Dept: HEMATOLOGY/ONCOLOGY | Facility: CLINIC | Age: 69
End: 2019-10-21

## 2019-10-21 VITALS
RESPIRATION RATE: 18 BRPM | TEMPERATURE: 98 F | SYSTOLIC BLOOD PRESSURE: 130 MMHG | HEART RATE: 89 BPM | DIASTOLIC BLOOD PRESSURE: 58 MMHG

## 2019-10-21 DIAGNOSIS — D64.9 ANEMIA REQUIRING TRANSFUSIONS: Primary | ICD-10-CM

## 2019-10-21 DIAGNOSIS — D64.9 ANEMIA REQUIRING TRANSFUSIONS: ICD-10-CM

## 2019-10-21 PROCEDURE — 25000003 PHARM REV CODE 250: Mod: HCNC | Performed by: INTERNAL MEDICINE

## 2019-10-21 PROCEDURE — P9040 RBC LEUKOREDUCED IRRADIATED: HCPCS | Mod: HCNC

## 2019-10-21 PROCEDURE — 36430 TRANSFUSION BLD/BLD COMPNT: CPT | Mod: HCNC

## 2019-10-21 PROCEDURE — 63600175 PHARM REV CODE 636 W HCPCS: Mod: HCNC | Performed by: INTERNAL MEDICINE

## 2019-10-21 PROCEDURE — 86920 COMPATIBILITY TEST SPIN: CPT | Mod: HCNC

## 2019-10-21 RX ORDER — ACETAMINOPHEN 325 MG/1
650 TABLET ORAL
Status: CANCELLED | OUTPATIENT
Start: 2019-10-21

## 2019-10-21 RX ORDER — DIPHENHYDRAMINE HCL 25 MG
25 CAPSULE ORAL
Status: CANCELLED | OUTPATIENT
Start: 2019-10-21

## 2019-10-21 RX ORDER — HYDROCODONE BITARTRATE AND ACETAMINOPHEN 500; 5 MG/1; MG/1
TABLET ORAL ONCE
Status: CANCELLED | OUTPATIENT
Start: 2019-10-21 | End: 2019-10-21

## 2019-10-21 RX ORDER — HYDROCODONE BITARTRATE AND ACETAMINOPHEN 500; 5 MG/1; MG/1
TABLET ORAL ONCE
Status: COMPLETED | OUTPATIENT
Start: 2019-10-21 | End: 2019-10-21

## 2019-10-21 RX ORDER — DIPHENHYDRAMINE HCL 25 MG
25 CAPSULE ORAL
Status: COMPLETED | OUTPATIENT
Start: 2019-10-21 | End: 2019-10-21

## 2019-10-21 RX ORDER — ACETAMINOPHEN 325 MG/1
650 TABLET ORAL
Status: COMPLETED | OUTPATIENT
Start: 2019-10-21 | End: 2019-10-21

## 2019-10-21 RX ADMIN — ACETAMINOPHEN 650 MG: 325 TABLET ORAL at 02:10

## 2019-10-21 RX ADMIN — DIPHENHYDRAMINE HYDROCHLORIDE 25 MG: 25 CAPSULE ORAL at 02:10

## 2019-10-21 RX ADMIN — SODIUM CHLORIDE: 0.9 INJECTION, SOLUTION INTRAVENOUS at 02:10

## 2019-10-21 NOTE — PLAN OF CARE
Tolerated 1 unit prbc well no tranfusion reactions noted, PIV removed cath tip intact site covered with gauze and co wrap. Avs printed and given to pt instructed to contact provider with concerns leaves clinic ambulatory with walker no distress.

## 2019-10-21 NOTE — TELEPHONE ENCOUNTER
Spoke to patient at 1400.  Informed her of lab results and need for blood transfusion. Verbalized understanding    ----- Message from Alia Hendricks sent at 10/21/2019  1:43 PM CDT -----  Contact: Patient  Results    Name of Test (Lab/Mammo/Etc): Lab    Date of Test: 10/21    Ordering Provider: Dr Valdes    Where the test was performed: [facility]    Best Call Back Number: [phone]    Additional Information:

## 2019-10-24 ENCOUNTER — ANESTHESIA EVENT (OUTPATIENT)
Dept: SURGERY | Facility: HOSPITAL | Age: 69
End: 2019-10-24
Payer: MEDICARE

## 2019-10-24 ENCOUNTER — HOSPITAL ENCOUNTER (OUTPATIENT)
Facility: HOSPITAL | Age: 69
Discharge: HOME | End: 2019-10-24
Attending: INTERNAL MEDICINE | Admitting: INTERNAL MEDICINE
Payer: MEDICARE

## 2019-10-24 ENCOUNTER — ANESTHESIA (OUTPATIENT)
Dept: SURGERY | Facility: HOSPITAL | Age: 69
End: 2019-10-24
Payer: MEDICARE

## 2019-10-24 VITALS
RESPIRATION RATE: 18 BRPM | BODY MASS INDEX: 30.99 KG/M2 | HEIGHT: 65 IN | HEART RATE: 81 BPM | DIASTOLIC BLOOD PRESSURE: 59 MMHG | SYSTOLIC BLOOD PRESSURE: 123 MMHG | TEMPERATURE: 98 F | OXYGEN SATURATION: 97 % | WEIGHT: 186 LBS

## 2019-10-24 DIAGNOSIS — D46.9 MYELODYSPLASTIC SYNDROME: ICD-10-CM

## 2019-10-24 LAB
BONE MARROW IRON STAIN COMMENT: NORMAL
BONE MARROW WRIGHT STAIN COMMENT: NORMAL
POCT GLUCOSE: 110 MG/DL (ref 70–110)
POCT GLUCOSE: 111 MG/DL (ref 70–110)

## 2019-10-24 PROCEDURE — 38222 PR BONE MARROW BIOPSY(IES) W/ASPIRATION(S); DIAGNOSTIC: ICD-10-PCS | Mod: HCNC,LT,, | Performed by: NURSE PRACTITIONER

## 2019-10-24 PROCEDURE — 88305 TISSUE EXAM BY PATHOLOGIST: CPT | Mod: 26,HCNC,, | Performed by: PATHOLOGY

## 2019-10-24 PROCEDURE — 88237 TISSUE CULTURE BONE MARROW: CPT | Mod: HCNC

## 2019-10-24 PROCEDURE — 88305 TISSUE SPECIMEN TO PATHOLOGY, BONE MARROW ASPIRATION/BIOPSY PROCEDURE: ICD-10-PCS | Mod: 26,HCNC,, | Performed by: PATHOLOGY

## 2019-10-24 PROCEDURE — 88299 UNLISTED CYTOGENETIC STUDY: CPT | Mod: HCNC

## 2019-10-24 PROCEDURE — 88341 TISSUE SPECIMEN TO PATHOLOGY, BONE MARROW ASPIRATION/BIOPSY PROCEDURE: ICD-10-PCS | Mod: 26,HCNC,, | Performed by: PATHOLOGY

## 2019-10-24 PROCEDURE — 88313 SPECIAL STAINS GROUP 2: CPT | Mod: HCNC

## 2019-10-24 PROCEDURE — 85097 TISSUE SPECIMEN TO PATHOLOGY, BONE MARROW ASPIRATION/BIOPSY PROCEDURE: ICD-10-PCS | Mod: 59,HCNC,, | Performed by: PATHOLOGY

## 2019-10-24 PROCEDURE — D9220A PRA ANESTHESIA: ICD-10-PCS | Mod: HCNC,,, | Performed by: ANESTHESIOLOGY

## 2019-10-24 PROCEDURE — 88313 SPECIAL STAINS GROUP 2: CPT | Mod: 26,HCNC,, | Performed by: PATHOLOGY

## 2019-10-24 PROCEDURE — 82962 GLUCOSE BLOOD TEST: CPT | Mod: HCNC | Performed by: INTERNAL MEDICINE

## 2019-10-24 PROCEDURE — 88189 PR  FLOWCYTOMETRY/READ, 16 & > MARKERS: ICD-10-PCS | Mod: HCNC,,, | Performed by: PATHOLOGY

## 2019-10-24 PROCEDURE — 63600175 PHARM REV CODE 636 W HCPCS: Mod: HCNC | Performed by: NURSE ANESTHETIST, CERTIFIED REGISTERED

## 2019-10-24 PROCEDURE — 88184 FLOWCYTOMETRY/ TC 1 MARKER: CPT | Mod: HCNC | Performed by: PATHOLOGY

## 2019-10-24 PROCEDURE — 88311 TISSUE SPECIMEN TO PATHOLOGY, BONE MARROW ASPIRATION/BIOPSY PROCEDURE: ICD-10-PCS | Mod: 26,HCNC,, | Performed by: PATHOLOGY

## 2019-10-24 PROCEDURE — 38222 DX BONE MARROW BX & ASPIR: CPT | Mod: HCNC,LT,, | Performed by: NURSE PRACTITIONER

## 2019-10-24 PROCEDURE — 25000003 PHARM REV CODE 250: Mod: HCNC | Performed by: INTERNAL MEDICINE

## 2019-10-24 PROCEDURE — 88342 IMHCHEM/IMCYTCHM 1ST ANTB: CPT | Mod: 26,HCNC,, | Performed by: PATHOLOGY

## 2019-10-24 PROCEDURE — D9220A PRA ANESTHESIA: Mod: HCNC,,, | Performed by: ANESTHESIOLOGY

## 2019-10-24 PROCEDURE — 88264 CHROMOSOME ANALYSIS 20-25: CPT | Mod: HCNC

## 2019-10-24 PROCEDURE — 71000015 HC POSTOP RECOV 1ST HR: Mod: HCNC | Performed by: INTERNAL MEDICINE

## 2019-10-24 PROCEDURE — 88341 IMHCHEM/IMCYTCHM EA ADD ANTB: CPT | Mod: HCNC,59 | Performed by: PATHOLOGY

## 2019-10-24 PROCEDURE — 88341 IMHCHEM/IMCYTCHM EA ADD ANTB: CPT | Mod: 26,HCNC,, | Performed by: PATHOLOGY

## 2019-10-24 PROCEDURE — 36000705 HC OR TIME LEV I EA ADD 15 MIN: Mod: HCNC | Performed by: INTERNAL MEDICINE

## 2019-10-24 PROCEDURE — 88313 TISSUE SPECIMEN TO PATHOLOGY, BONE MARROW ASPIRATION/BIOPSY PROCEDURE: ICD-10-PCS | Mod: 26,HCNC,, | Performed by: PATHOLOGY

## 2019-10-24 PROCEDURE — 37000009 HC ANESTHESIA EA ADD 15 MINS: Mod: HCNC | Performed by: INTERNAL MEDICINE

## 2019-10-24 PROCEDURE — 88311 DECALCIFY TISSUE: CPT | Mod: 26,HCNC,, | Performed by: PATHOLOGY

## 2019-10-24 PROCEDURE — 88305 TISSUE EXAM BY PATHOLOGIST: CPT | Mod: HCNC,59 | Performed by: PATHOLOGY

## 2019-10-24 PROCEDURE — 88271 CYTOGENETICS DNA PROBE: CPT | Mod: 59,HCNC

## 2019-10-24 PROCEDURE — 36000704 HC OR TIME LEV I 1ST 15 MIN: Mod: HCNC | Performed by: INTERNAL MEDICINE

## 2019-10-24 PROCEDURE — 85097 BONE MARROW INTERPRETATION: CPT | Mod: 59,HCNC,, | Performed by: PATHOLOGY

## 2019-10-24 PROCEDURE — 88342 TISSUE SPECIMEN TO PATHOLOGY, BONE MARROW ASPIRATION/BIOPSY PROCEDURE: ICD-10-PCS | Mod: 26,HCNC,, | Performed by: PATHOLOGY

## 2019-10-24 PROCEDURE — 82962 GLUCOSE BLOOD TEST: CPT | Mod: HCNC,91 | Performed by: INTERNAL MEDICINE

## 2019-10-24 PROCEDURE — 88189 FLOWCYTOMETRY/READ 16 & >: CPT | Mod: HCNC,,, | Performed by: PATHOLOGY

## 2019-10-24 PROCEDURE — 37000008 HC ANESTHESIA 1ST 15 MINUTES: Mod: HCNC | Performed by: INTERNAL MEDICINE

## 2019-10-24 PROCEDURE — 71000044 HC DOSC ROUTINE RECOVERY FIRST HOUR: Mod: HCNC | Performed by: INTERNAL MEDICINE

## 2019-10-24 PROCEDURE — 88185 FLOWCYTOMETRY/TC ADD-ON: CPT | Mod: HCNC | Performed by: PATHOLOGY

## 2019-10-24 RX ORDER — LIDOCAINE HYDROCHLORIDE 10 MG/ML
INJECTION, SOLUTION EPIDURAL; INFILTRATION; INTRACAUDAL; PERINEURAL
Status: DISCONTINUED | OUTPATIENT
Start: 2019-10-24 | End: 2019-10-24 | Stop reason: HOSPADM

## 2019-10-24 RX ORDER — PROPOFOL 10 MG/ML
VIAL (ML) INTRAVENOUS CONTINUOUS PRN
Status: DISCONTINUED | OUTPATIENT
Start: 2019-10-24 | End: 2019-10-24

## 2019-10-24 RX ORDER — LIDOCAINE HCL/PF 100 MG/5ML
SYRINGE (ML) INTRAVENOUS
Status: DISCONTINUED | OUTPATIENT
Start: 2019-10-24 | End: 2019-10-24

## 2019-10-24 RX ORDER — SODIUM CHLORIDE 9 MG/ML
INJECTION, SOLUTION INTRAVENOUS CONTINUOUS PRN
Status: DISCONTINUED | OUTPATIENT
Start: 2019-10-24 | End: 2019-10-24

## 2019-10-24 RX ORDER — SODIUM CHLORIDE 0.9 % (FLUSH) 0.9 %
3 SYRINGE (ML) INJECTION
Status: DISCONTINUED | OUTPATIENT
Start: 2019-10-24 | End: 2019-10-24 | Stop reason: HOSPADM

## 2019-10-24 RX ORDER — PROPOFOL 10 MG/ML
VIAL (ML) INTRAVENOUS
Status: DISCONTINUED | OUTPATIENT
Start: 2019-10-24 | End: 2019-10-24

## 2019-10-24 RX ADMIN — LIDOCAINE HYDROCHLORIDE 20 MG: 20 INJECTION, SOLUTION INTRAVENOUS at 07:10

## 2019-10-24 RX ADMIN — PROPOFOL 30 MG: 10 INJECTION, EMULSION INTRAVENOUS at 08:10

## 2019-10-24 RX ADMIN — PROPOFOL 60 MG: 10 INJECTION, EMULSION INTRAVENOUS at 07:10

## 2019-10-24 RX ADMIN — SODIUM CHLORIDE: 9 INJECTION, SOLUTION INTRAVENOUS at 07:10

## 2019-10-24 RX ADMIN — PROPOFOL 100 MCG/KG/MIN: 10 INJECTION, EMULSION INTRAVENOUS at 07:10

## 2019-10-24 NOTE — DISCHARGE SUMMARY
[facility]  Hematology  Bone Marrow Transplant  Discharge Summary      Patient Name: Susan Puente  MRN: [id number]  Admission Date: 10/24/2019  Hospital Length of Stay: 0 days  Discharge Date and Time: No discharge date for patient encounter.  Attending Physician: Gita Valdes MD   Discharging Provider: Altagracia Cornejo NP  Primary Care Provider: Claudia Rapp MD    HPI: Patient with MDS presents for planned BMBx under sedation.    Procedure(s) (LRB):  Biopsy-bone marrow (N/A)     Hospital Course: Patient with MDS presented for planned BMBx under sedation. Tolerated procedure well.    Pending Diagnostic Studies:     Procedure Component Value Units Date/Time    Bone Marrow Prep and Stain [[id number]] Collected:  10/24/19 0731    Order Status:  Sent Lab Status:  In process Updated:  10/24/19 0731    Specimen:  Bone Marrow     Chromosome Analysis, Bone Marrow [[id number]] Collected:  10/24/19 0732    Order Status:  Sent Lab Status:  In process Updated:  10/24/19 0732    Specimen:  Bone Marrow     Heme Disorders DNA/RNA Hold, Bone Marrow [[id number]] Collected:  10/24/19 0731    Order Status:  Sent Lab Status:  In process Updated:  10/24/19 0731    Specimen:  Bone Marrow     Iron Stain, Bone Marrow [[id number]] Collected:  10/24/19 0731    Order Status:  Sent Lab Status:  In process Updated:  10/24/19 0731    Specimen:  Bone Marrow     Leukemia/Lymphoma Screen - Bone Marrow Left Posterior Iliac Crest [[id number]] Collected:  10/24/19 0732    Order Status:  Sent Lab Status:  In process Updated:  10/24/19 0732    Specimen:  Bone Marrow     Myelodysplastic Syndrome (MDS), FISH, Bone Marrow [[id number]] Collected:  10/24/19 0732    Order Status:  Sent Lab Status:  In process Updated:  10/24/19 0732    Specimen:  Bone Marrow     Tissue Specimen to Pathology, Bone Marrow Aspiration/Biopsy Procedure [[id number]] Collected:  10/24/19 0731    Order Status:  Sent Lab Status:  No result     Specimen:  Bone  Marrow Aspirate, Left Iliac Crest         Final Active Diagnoses:    Diagnosis Date Noted POA    Myelodysplastic syndrome [D46.9] 01/19/2017 Yes      Problems Resolved During this Admission:      Discharged Condition: stable    Disposition: Discharged home.    Follow Up: As instructed by Dr. Valdes.    Patient Instructions:   Provided to patient at time of discharge.    Medications:  Reconciled Home Medications:      Medication List      ASK your doctor about these medications    acyclovir 400 MG tablet  Commonly known as:  ZOVIRAX  Take 1 tablet (400 mg total) by mouth 2 (two) times daily.     alirocumab 75 mg/mL Pnij  Commonly known as:  PRALUENT PEN  Inject 1 mL (75 mg total) into the skin every 14 (fourteen) days.     aspirin 81 MG EC tablet  Commonly known as:  ECOTRIN  Take 81 mg by mouth once daily.     b complex vitamins capsule  Take 1 capsule by mouth once daily.     blood sugar diagnostic Strp  1 each by Misc.(Non-Drug; Combo Route) route once daily.     * blood-glucose meter kit  Use as instructed     * TRUE METRIX GLUCOSE METER Misc  Generic drug:  blood-glucose meter     citalopram 20 MG tablet  Commonly known as:  CELEXA  Take 1 tablet (20 mg total) by mouth once daily.     fexofenadine 30 mg Tbdl  Take by mouth once daily.     fish oil-omega-3 fatty acids 300-1,000 mg capsule  Take 4 g by mouth nightly.     fluconazole 200 MG Tab  Commonly known as:  DIFLUCAN  Take 2 tablets (400 mg total) by mouth once daily.     lancets 28 gauge Misc  Commonly known as:  FREESTYLE LANCETS  1 lancet by Misc.(Non-Drug; Combo Route) route once daily.     LIDOCAINE VISCOUS 2 % Soln  Generic drug:  lidocaine HCl 2%  daily as needed.     magic mouthwash diphen/antac/lidoc  Swish and spit 15 mls every 4 (four)  hours as needed.     magnesium 30 mg Tab  Take by mouth once.     metFORMIN 500 MG 24 hr tablet  Commonly known as:  GLUCOPHAGE-XR  Take 1 tablet (500 mg total) by mouth daily with breakfast.     ondansetron 8 MG  Tbdl  Commonly known as:  ZOFRAN-ODT  Dissolve 1 tablet by mouth every 6 hours     pantoprazole 40 MG tablet  Commonly known as:  PROTONIX  Take 1 tablet (40 mg total) by mouth once daily.     potassium 99 mg Tab  Take by mouth once.     TYLENOL ARTHRITIS PAIN 650 MG Tbsr  Generic drug:  acetaminophen  Take 650 mg by mouth every 8 (eight) hours as needed (for pain).     walker Misc  1 each by Misc.(Non-Drug; Combo Route) route once daily at 6am.         * This list has 2 medication(s) that are the same as other medications prescribed for you. Read the directions carefully, and ask your doctor or other care provider to review them with you.                Altagracia Cornejo, NP  Bone Marrow Transplant  [facility]

## 2019-10-24 NOTE — TRANSFER OF CARE
"Anesthesia Transfer of Care Note    Patient: Susan Puente    Procedure(s) Performed: Procedure(s) (LRB):  Biopsy-bone marrow (Left)    Patient location: PACU    Anesthesia Type: MAC    Transport from OR: Transported from OR on room air with adequate spontaneous ventilation    Post pain: adequate analgesia    Post assessment: no apparent anesthetic complications and tolerated procedure well    Post vital signs: stable    Level of consciousness: awake and alert    Nausea/Vomiting: no nausea/vomiting    Complications: none    Transfer of care protocol was followed      Last vitals:   Visit Vitals  BP (!) 140/66 (BP Location: Left arm, Patient Position: Lying)   Pulse 99   Temp 36.6 °C (97.8 °F) (Temporal)   Resp 18   Ht 5' 5" (1.651 m)   Wt 84.4 kg (186 lb)   SpO2 (!) 93%   Breastfeeding? No   BMI 30.95 kg/m²     "

## 2019-10-24 NOTE — DISCHARGE INSTRUCTIONS
Discharge Instructions: After Your Surgery  Youve just had surgery. During surgery, you were given medicine called anesthesia to keep you relaxed and free of pain. After surgery, you may have some pain or nausea. This is common. Here are some tips for feeling better and getting well after surgery.     Stay on schedule with your medicine.   Going home  Your healthcare provider will show you how to take care of yourself when you go home. He or she will also answer your questions. Have an adult family member or friend drive you home. For the first 24 hours after your surgery:  · Do not drive or use heavy equipment.  · Do not make important decisions or sign legal papers.  · Do not drink alcohol.  · Have someone stay with you, if needed. He or she can watch for problems and help keep you safe.  Be sure to go to all follow-up visits with your healthcare provider. And rest after your surgery for as long as your healthcare provider tells you to.  Coping with pain  If you have pain after surgery, pain medicine will help you feel better. Take it as told, before pain becomes severe. Also, ask your healthcare provider or pharmacist about other ways to control pain. This might be with heat, ice, or relaxation. And follow any other instructions your surgeon or nurse gives you.  Tips for taking pain medicine  To get the best relief possible, remember these points:  · Pain medicines can upset your stomach. Taking them with a little food may help.  · Most pain relievers taken by mouth need at least 20 to 30 minutes to start to work.  · Taking medicine on a schedule can help you remember to take it. Try to time your medicine so that you can take it before starting an activity. This might be before you get dressed, go for a walk, or sit down for dinner.  · Constipation is a common side effect of pain medicines. Call your healthcare provider before taking any medicines such as laxatives or stool softeners to help ease constipation.  Also ask if you should skip any foods. Drinking lots of fluids and eating foods such as fruits and vegetables that are high in fiber can also help. Remember, do not take laxatives unless your surgeon has prescribed them.  · Drinking alcohol and taking pain medicine can cause dizziness and slow your breathing. It can even be deadly. Do not drink alcohol while taking pain medicine.  · Pain medicine can make you react more slowly to things. Do not drive or run machinery while taking pain medicine.  Your healthcare provider may tell you to take acetaminophen to help ease your pain. Ask him or her how much you are supposed to take each day. Acetaminophen or other pain relievers may interact with your prescription medicines or other over-the-counter (OTC) medicines. Some prescription medicines have acetaminophen and other ingredients. Using both prescription and OTC acetaminophen for pain can cause you to overdose. Read the labels on your OTC medicines with care. This will help you to clearly know the list of ingredients, how much to take, and any warnings. It may also help you not take too much acetaminophen. If you have questions or do not understand the information, ask your pharmacist or healthcare provider to explain it to you before you take the OTC medicine.  Managing nausea  Some people have an upset stomach after surgery. This is often because of anesthesia, pain, or pain medicine, or the stress of surgery. These tips will help you handle nausea and eat healthy foods as you get better. If you were on a special food plan before surgery, ask your healthcare provider if you should follow it while you get better. These tips may help:  · Do not push yourself to eat. Your body will tell you when to eat and how much.  · Start off with clear liquids and soup. They are easier to digest.  · Next try semi-solid foods, such as mashed potatoes, applesauce, and gelatin, as you feel ready.  · Slowly move to solid foods. Dont  eat fatty, rich, or spicy foods at first.  · Do not force yourself to have 3 large meals a day. Instead eat smaller amounts more often.  · Take pain medicines with a small amount of solid food, such as crackers or toast, to avoid nausea.     Call your surgeon if  · You still have pain an hour after taking medicine. The medicine may not be strong enough.  · You feel too sleepy, dizzy, or groggy. The medicine may be too strong.  · You have side effects like nausea, vomiting, or skin changes, such as rash, itching, or hives.       If you have obstructive sleep apnea  You were given anesthesia medicine during surgery to keep you comfortable and free of pain. After surgery, you may have more apnea spells because of this medicine and other medicines you were given. The spells may last longer than usual.   At home:  · Keep using the continuous positive airway pressure (CPAP) device when you sleep. Unless your healthcare provider tells you not to, use it when you sleep, day or night. CPAP is a common device used to treat obstructive sleep apnea.  · Talk with your provider before taking any pain medicine, muscle relaxants, or sedatives. Your provider will tell you about the possible dangers of taking these medicines.  Date Last Reviewed: 12/1/2016 © 2000-2017 [employer]. [address] [place] [address], [place] [address]. All rights reserved. This information is not intended as a substitute for professional medical care. Always follow your healthcare professional's instructions.                  Discharge Instructions for patients having a Bone Marrow Aspiration / Biopsy    Keep bandage in place for 24 hours following procedure.   - Do not shower or take a tub bath during this time. (You may take a sponge bath.)  - Call the nurse or physician for excessive bleeding or pain.  - You may take Tylenol as needed for pain.     You have received medication to sedate you.  -Do not drive a car or operate heavy machinery for  the rest of the day.  You may resume other activities as tolerated.    You can call [phone] for any problems during the hours of 8:00 AM- 5:00 PM.    For an emergency after 5;00 PM you can call [phone] and have the  page the Hematologist / Oncologist on call.

## 2019-10-24 NOTE — INTERVAL H&P NOTE
The patient has been examined and the H&P has been reviewed:    I concur with the findings and no changes have occurred since H&P was written.    Anesthesia/Surgery risks, benefits and alternative options discussed and understood by patient/family.          Active Hospital Problems    Diagnosis  POA    Myelodysplastic syndrome [D46.9]  Yes      Resolved Hospital Problems   No resolved problems to display.

## 2019-10-24 NOTE — BRIEF OP NOTE
PROCEDURE NOTE:  Date of Procedure: 10/24/2019  Bone Marrow Biopsy and Aspiration  Indication: MDS  Consent: Informed consent was obtained from patient.  Timeout: Done and documented.  Position: Right lateral  Site: Left posterior illiac crest.  Prep: Betadine.  Needle used: 11 gauge Jamshidi needle.  Anesthetic: 1% lidocaine 5 cc.  Biopsy: The biopsy needle was introduced into the marrow cavity and an aspirate was obtained without complications and sent for flow cytometry and cytogenetics, RNA/DNA hold, and MDS FISH. Core biopsy obtained without difficulty and sent for routine histologic examination.  Complications: None.  Disposition: The patient was discharged home per anesthesia protocol.  Blood loss: Minimal.     Altagracia Cornejo, FNP  Hematology/Oncology/Bone Marrow Transplant

## 2019-10-24 NOTE — ANESTHESIA POSTPROCEDURE EVALUATION
Anesthesia Post Evaluation    Patient: Susan Puente    Procedure(s) Performed: Procedure(s) (LRB):  Biopsy-bone marrow (Left)    Final Anesthesia Type: MAC  Patient location during evaluation: [facility]  Patient participation: Yes- Able to Participate  Level of consciousness: awake and alert  Post-procedure vital signs: reviewed and stable  Pain management: adequate  Airway patency: patent  PONV status at discharge: No PONV  Anesthetic complications: no      Cardiovascular status: blood pressure returned to baseline  Respiratory status: unassisted  Hydration status: euvolemic  Follow-up not needed.          Vitals Value Taken Time   /59 10/24/2019  8:45 AM   Temp 36.7 °C (98 °F) 10/24/2019  8:45 AM   Pulse 81 10/24/2019  8:45 AM   Resp 18 10/24/2019  8:45 AM   SpO2 97 % 10/24/2019  8:45 AM         No case tracking events are documented in the log.      Pain/Caitlyn Score: Caitlyn Score: 10 (10/24/2019  8:45 AM)

## 2019-10-24 NOTE — PLAN OF CARE
Discharge instructions given, Pt and friend verbalize understanding. Consents in chart. Vital Signs stable.  Respirations even and unlabored. No distress noted. Tolerating liquids.    No complaints of Nausea or Vomiting. No questions or concerns at this time. All questions answered

## 2019-10-24 NOTE — ANESTHESIA PREPROCEDURE EVALUATION
10/24/2019  Ssuan Puente is a 69 y.o., female.    Anesthesia Evaluation    I have reviewed the Patient Summary Reports.     I have reviewed the Medications.     Review of Systems  Anesthesia Hx:  No problems with previous Anesthesia  History of prior surgery of interest to airway management or planning: Previous anesthesia: General   Social:  Former Smoker, No Alcohol Use    Hematology/Oncology:  Hematology Normal   Oncology Normal     EENT/Dental:EENT/Dental Normal   Cardiovascular:   Exercise tolerance: good Hypertension, well controlled CAD asymptomatic     Pulmonary:  Pulmonary Normal    Renal/:   Chronic Renal Disease, CRI    Hepatic/GI:   GERD, well controlled    Musculoskeletal:  Musculoskeletal Normal    Neurological:  Neurology Normal    Endocrine:   Diabetes, well controlled, type 2    Dermatological:  Skin Normal    Psych:   Psychiatric History          Physical Exam  General:  Well nourished    Airway/Jaw/Neck:  Airway Findings: Mouth Opening: Normal Tongue: Normal  General Airway Assessment: Adult  Mallampati: II  TM Distance: Normal, at least 6 cm  Jaw/Neck Findings:  Neck ROM: Normal ROM      Dental:  Dental Findings: In tact        Mental Status:  Mental Status Findings:  Cooperative, Alert and Oriented         Anesthesia Plan  Type of Anesthesia, risks & benefits discussed:  Anesthesia Type:  general, MAC  Patient's Preference: MAC  Intra-op Monitoring Plan: standard ASA monitors  Intra-op Monitoring Plan Comments:   Post Op Pain Control Plan: multimodal analgesia  Post Op Pain Control Plan Comments:   Induction:   IV  Beta Blocker:  Patient is not currently on a Beta-Blocker (No further documentation required).       Informed Consent: Patient understands risks and agrees with Anesthesia plan.  Questions answered. Anesthesia consent signed with patient.  ASA Score: 3     Day of  Surgery Review of History & Physical:  There are no significant changes.  H&P update referred to the provider.         Ready For Surgery From Anesthesia Perspective.

## 2019-10-25 DIAGNOSIS — I25.10 CORONARY ARTERY DISEASE, ANGINA PRESENCE UNSPECIFIED, UNSPECIFIED VESSEL OR LESION TYPE, UNSPECIFIED WHETHER NATIVE OR TRANSPLANTED HEART: ICD-10-CM

## 2019-10-25 DIAGNOSIS — F32.A DEPRESSION, UNSPECIFIED DEPRESSION TYPE: ICD-10-CM

## 2019-10-25 LAB
BODY SITE - BONE MARROW: NORMAL
CLINICAL DIAGNOSIS - BONE MARROW: NORMAL
DNA/RNA EXTRACT AND HOLD RESULT: NORMAL
DNA/RNA EXTRACTION: NORMAL
EXHR SPECIMEN TYPE: NORMAL
FLOW CYTOMETRY ANTIBODIES ANALYZED - BONE MARROW: NORMAL
FLOW CYTOMETRY COMMENT - BONE MARROW: NORMAL
FLOW CYTOMETRY INTERPRETATION - BONE MARROW: NORMAL

## 2019-10-25 RX ORDER — LISINOPRIL AND HYDROCHLOROTHIAZIDE 12.5; 2 MG/1; MG/1
2 TABLET ORAL DAILY
Qty: 180 TABLET | Refills: 3 | Status: SHIPPED | OUTPATIENT
Start: 2019-10-25 | End: 2020-12-08 | Stop reason: SDUPTHER

## 2019-10-25 RX ORDER — CITALOPRAM 20 MG/1
20 TABLET, FILM COATED ORAL DAILY
Qty: 30 TABLET | Refills: 2 | Status: CANCELLED | OUTPATIENT
Start: 2019-10-25

## 2019-10-28 ENCOUNTER — PATIENT MESSAGE (OUTPATIENT)
Dept: HEMATOLOGY/ONCOLOGY | Facility: CLINIC | Age: 69
End: 2019-10-28

## 2019-10-28 ENCOUNTER — TELEPHONE (OUTPATIENT)
Dept: HEMATOLOGY/ONCOLOGY | Facility: CLINIC | Age: 69
End: 2019-10-28

## 2019-10-28 DIAGNOSIS — F32.A DEPRESSION, UNSPECIFIED DEPRESSION TYPE: ICD-10-CM

## 2019-10-28 RX ORDER — CITALOPRAM 20 MG/1
20 TABLET, FILM COATED ORAL DAILY
Qty: 30 TABLET | Refills: 2 | Status: SHIPPED | OUTPATIENT
Start: 2019-10-28 | End: 2020-03-03

## 2019-10-28 NOTE — TELEPHONE ENCOUNTER
Spoke to patient.  States she is feeling good and does not feel like she needs a transfusion today.

## 2019-11-01 LAB
CHROM BANDING METHOD: NORMAL
CHROMOSOME ANALYSIS BM ADDITIONAL INFORMATION: NORMAL
CHROMOSOME ANALYSIS BM RELEASED BY: NORMAL
CHROMOSOME ANALYSIS BM RESULT SUMMARY: NORMAL
CLINICAL CYTOGENETICIST REVIEW: NORMAL
CLINICAL CYTOGENETICIST REVIEW: NORMAL
FMDS SPECIMEN: NORMAL
KARYOTYP MAR: NORMAL
MDS FISH ADDITIONAL INFORMATION: NORMAL
MDS FISH DISCLAIMER: NORMAL
MDS FISH REASON FOR REFERRAL (BM): NORMAL
MDS FISH RELEASED BY: NORMAL
MDS FISH RESULT (BM): NORMAL
MDS FISH RESULT SUMMARY: NORMAL
MDS FISH RESULT TABLE: NORMAL
REASON FOR REFERRAL (NARRATIVE): NORMAL
REF LAB TEST METHOD: NORMAL
REF LAB TEST METHOD: NORMAL
SPECIMEN SOURCE: NORMAL
SPECIMEN SOURCE: NORMAL
SPECIMEN: NORMAL

## 2019-11-04 ENCOUNTER — LAB VISIT (OUTPATIENT)
Dept: LAB | Facility: HOSPITAL | Age: 69
End: 2019-11-04
Attending: INTERNAL MEDICINE
Payer: MEDICARE

## 2019-11-04 ENCOUNTER — OFFICE VISIT (OUTPATIENT)
Dept: HEMATOLOGY/ONCOLOGY | Facility: CLINIC | Age: 69
End: 2019-11-04
Payer: MEDICARE

## 2019-11-04 ENCOUNTER — TELEPHONE (OUTPATIENT)
Dept: HEMATOLOGY/ONCOLOGY | Facility: CLINIC | Age: 69
End: 2019-11-04

## 2019-11-04 ENCOUNTER — INFUSION (OUTPATIENT)
Dept: INFUSION THERAPY | Facility: HOSPITAL | Age: 69
End: 2019-11-04
Attending: INTERNAL MEDICINE
Payer: MEDICARE

## 2019-11-04 VITALS
WEIGHT: 189.81 LBS | BODY MASS INDEX: 31.63 KG/M2 | OXYGEN SATURATION: 97 % | DIASTOLIC BLOOD PRESSURE: 67 MMHG | TEMPERATURE: 100 F | SYSTOLIC BLOOD PRESSURE: 143 MMHG | HEIGHT: 65 IN | HEART RATE: 108 BPM | RESPIRATION RATE: 18 BRPM

## 2019-11-04 VITALS
SYSTOLIC BLOOD PRESSURE: 152 MMHG | DIASTOLIC BLOOD PRESSURE: 69 MMHG | TEMPERATURE: 99 F | HEART RATE: 95 BPM | RESPIRATION RATE: 18 BRPM

## 2019-11-04 DIAGNOSIS — D46.C MDS (MYELODYSPLASTIC SYNDROME) WITH 5Q DELETION: ICD-10-CM

## 2019-11-04 DIAGNOSIS — E11.22 CONTROLLED TYPE 2 DIABETES MELLITUS WITH STAGE 3 CHRONIC KIDNEY DISEASE, WITHOUT LONG-TERM CURRENT USE OF INSULIN: ICD-10-CM

## 2019-11-04 DIAGNOSIS — D64.9 ANEMIA REQUIRING TRANSFUSIONS: Primary | ICD-10-CM

## 2019-11-04 DIAGNOSIS — F43.23 ADJUSTMENT DISORDER WITH MIXED ANXIETY AND DEPRESSED MOOD: ICD-10-CM

## 2019-11-04 DIAGNOSIS — I10 ESSENTIAL HYPERTENSION: ICD-10-CM

## 2019-11-04 DIAGNOSIS — M54.32 BILATERAL SCIATICA: ICD-10-CM

## 2019-11-04 DIAGNOSIS — D64.9 ANEMIA REQUIRING TRANSFUSIONS: ICD-10-CM

## 2019-11-04 DIAGNOSIS — R74.01 TRANSAMINITIS: ICD-10-CM

## 2019-11-04 DIAGNOSIS — D46.C MDS (MYELODYSPLASTIC SYNDROME) WITH 5Q DELETION: Primary | ICD-10-CM

## 2019-11-04 DIAGNOSIS — N18.30 STAGE 3 CHRONIC KIDNEY DISEASE: ICD-10-CM

## 2019-11-04 DIAGNOSIS — N18.30 CONTROLLED TYPE 2 DIABETES MELLITUS WITH STAGE 3 CHRONIC KIDNEY DISEASE, WITHOUT LONG-TERM CURRENT USE OF INSULIN: ICD-10-CM

## 2019-11-04 DIAGNOSIS — M54.31 BILATERAL SCIATICA: ICD-10-CM

## 2019-11-04 LAB
ABO + RH BLD: NORMAL
ALBUMIN SERPL BCP-MCNC: 4 G/DL (ref 3.5–5.2)
ALP SERPL-CCNC: 76 U/L (ref 55–135)
ALT SERPL W/O P-5'-P-CCNC: 102 U/L (ref 10–44)
ANION GAP SERPL CALC-SCNC: 12 MMOL/L (ref 8–16)
ANISOCYTOSIS BLD QL SMEAR: SLIGHT
AST SERPL-CCNC: 46 U/L (ref 10–40)
BASOPHILS NFR BLD: 0 % (ref 0–1.9)
BILIRUB SERPL-MCNC: 0.7 MG/DL (ref 0.1–1)
BLD GP AB SCN CELLS X3 SERPL QL: NORMAL
BLD PROD TYP BPU: NORMAL
BLOOD UNIT EXPIRATION DATE: NORMAL
BLOOD UNIT TYPE CODE: 6200
BLOOD UNIT TYPE: NORMAL
BUN SERPL-MCNC: 17 MG/DL (ref 8–23)
CALCIUM SERPL-MCNC: 9.4 MG/DL (ref 8.7–10.5)
CHLORIDE SERPL-SCNC: 106 MMOL/L (ref 95–110)
CO2 SERPL-SCNC: 22 MMOL/L (ref 23–29)
CODING SYSTEM: NORMAL
CREAT SERPL-MCNC: 1 MG/DL (ref 0.5–1.4)
DIFFERENTIAL METHOD: ABNORMAL
DISPENSE STATUS: NORMAL
EOSINOPHIL NFR BLD: 5 % (ref 0–8)
ERYTHROCYTE [DISTWIDTH] IN BLOOD BY AUTOMATED COUNT: 19.1 % (ref 11.5–14.5)
EST. GFR  (AFRICAN AMERICAN): >60 ML/MIN/1.73 M^2
EST. GFR  (NON AFRICAN AMERICAN): 57.6 ML/MIN/1.73 M^2
GLUCOSE SERPL-MCNC: 104 MG/DL (ref 70–110)
HCT VFR BLD AUTO: 20.2 % (ref 37–48.5)
HGB BLD-MCNC: 6.4 G/DL (ref 12–16)
HYPOCHROMIA BLD QL SMEAR: ABNORMAL
IMM GRANULOCYTES # BLD AUTO: ABNORMAL K/UL (ref 0–0.04)
IMM GRANULOCYTES NFR BLD AUTO: ABNORMAL % (ref 0–0.5)
LYMPHOCYTES NFR BLD: 47 % (ref 18–48)
MCH RBC QN AUTO: 30.8 PG (ref 27–31)
MCHC RBC AUTO-ENTMCNC: 31.7 G/DL (ref 32–36)
MCV RBC AUTO: 97 FL (ref 82–98)
MONOCYTES NFR BLD: 3 % (ref 4–15)
NEUTROPHILS NFR BLD: 45 % (ref 38–73)
NRBC BLD-RTO: 0 /100 WBC
NUM UNITS TRANS PACKED RBC: NORMAL
OVALOCYTES BLD QL SMEAR: ABNORMAL
PLATELET # BLD AUTO: 259 K/UL (ref 150–350)
PMV BLD AUTO: 10.9 FL (ref 9.2–12.9)
POIKILOCYTOSIS BLD QL SMEAR: SLIGHT
POLYCHROMASIA BLD QL SMEAR: ABNORMAL
POTASSIUM SERPL-SCNC: 4 MMOL/L (ref 3.5–5.1)
PROT SERPL-MCNC: 7.1 G/DL (ref 6–8.4)
RBC # BLD AUTO: 2.08 M/UL (ref 4–5.4)
SODIUM SERPL-SCNC: 140 MMOL/L (ref 136–145)
WBC # BLD AUTO: 1.41 K/UL (ref 3.9–12.7)

## 2019-11-04 PROCEDURE — 85027 COMPLETE CBC AUTOMATED: CPT | Mod: HCNC

## 2019-11-04 PROCEDURE — 99999 PR PBB SHADOW E&M-EST. PATIENT-LVL V: ICD-10-PCS | Mod: PBBFAC,HCNC,, | Performed by: NURSE PRACTITIONER

## 2019-11-04 PROCEDURE — 27201040 HC RC 50 FILTER: Mod: HCNC

## 2019-11-04 PROCEDURE — 80053 COMPREHEN METABOLIC PANEL: CPT | Mod: HCNC

## 2019-11-04 PROCEDURE — 36415 COLL VENOUS BLD VENIPUNCTURE: CPT | Mod: HCNC

## 2019-11-04 PROCEDURE — P9038 RBC IRRADIATED: HCPCS | Mod: HCNC

## 2019-11-04 PROCEDURE — 63600175 PHARM REV CODE 636 W HCPCS: Mod: HCNC | Performed by: NURSE PRACTITIONER

## 2019-11-04 PROCEDURE — 3078F PR MOST RECENT DIASTOLIC BLOOD PRESSURE < 80 MM HG: ICD-10-PCS | Mod: HCNC,CPTII,S$GLB, | Performed by: NURSE PRACTITIONER

## 2019-11-04 PROCEDURE — 63600175 PHARM REV CODE 636 W HCPCS: Mod: JW,JG,HCNC | Performed by: INTERNAL MEDICINE

## 2019-11-04 PROCEDURE — 3077F PR MOST RECENT SYSTOLIC BLOOD PRESSURE >= 140 MM HG: ICD-10-PCS | Mod: HCNC,CPTII,S$GLB, | Performed by: NURSE PRACTITIONER

## 2019-11-04 PROCEDURE — 1101F PT FALLS ASSESS-DOCD LE1/YR: CPT | Mod: HCNC,CPTII,S$GLB, | Performed by: NURSE PRACTITIONER

## 2019-11-04 PROCEDURE — 99214 PR OFFICE/OUTPT VISIT, EST, LEVL IV, 30-39 MIN: ICD-10-PCS | Mod: HCNC,S$GLB,, | Performed by: NURSE PRACTITIONER

## 2019-11-04 PROCEDURE — 99999 PR PBB SHADOW E&M-EST. PATIENT-LVL V: CPT | Mod: PBBFAC,HCNC,, | Performed by: NURSE PRACTITIONER

## 2019-11-04 PROCEDURE — 85007 BL SMEAR W/DIFF WBC COUNT: CPT | Mod: HCNC

## 2019-11-04 PROCEDURE — 3078F DIAST BP <80 MM HG: CPT | Mod: HCNC,CPTII,S$GLB, | Performed by: NURSE PRACTITIONER

## 2019-11-04 PROCEDURE — 99214 OFFICE O/P EST MOD 30 MIN: CPT | Mod: HCNC,S$GLB,, | Performed by: NURSE PRACTITIONER

## 2019-11-04 PROCEDURE — 86901 BLOOD TYPING SEROLOGIC RH(D): CPT | Mod: HCNC

## 2019-11-04 PROCEDURE — 25000003 PHARM REV CODE 250: Mod: HCNC | Performed by: NURSE PRACTITIONER

## 2019-11-04 PROCEDURE — 86920 COMPATIBILITY TEST SPIN: CPT | Mod: HCNC

## 2019-11-04 PROCEDURE — 1101F PR PT FALLS ASSESS DOC 0-1 FALLS W/OUT INJ PAST YR: ICD-10-PCS | Mod: HCNC,CPTII,S$GLB, | Performed by: NURSE PRACTITIONER

## 2019-11-04 PROCEDURE — 96401 CHEMO ANTI-NEOPL SQ/IM: CPT | Mod: HCNC

## 2019-11-04 PROCEDURE — 36430 TRANSFUSION BLD/BLD COMPNT: CPT | Mod: HCNC

## 2019-11-04 PROCEDURE — 3077F SYST BP >= 140 MM HG: CPT | Mod: HCNC,CPTII,S$GLB, | Performed by: NURSE PRACTITIONER

## 2019-11-04 RX ORDER — ONDANSETRON 2 MG/ML
8 INJECTION INTRAMUSCULAR; INTRAVENOUS
Status: CANCELLED | OUTPATIENT
Start: 2019-11-05

## 2019-11-04 RX ORDER — ACETAMINOPHEN 325 MG/1
650 TABLET ORAL
Status: COMPLETED | OUTPATIENT
Start: 2019-11-04 | End: 2019-11-04

## 2019-11-04 RX ORDER — AZACITIDINE 100 MG/1
75 INJECTION, POWDER, LYOPHILIZED, FOR SOLUTION INTRAVENOUS; SUBCUTANEOUS
Status: CANCELLED | OUTPATIENT
Start: 2019-11-07

## 2019-11-04 RX ORDER — HYDROCODONE BITARTRATE AND ACETAMINOPHEN 500; 5 MG/1; MG/1
TABLET ORAL ONCE
Status: COMPLETED | OUTPATIENT
Start: 2019-11-04 | End: 2019-11-04

## 2019-11-04 RX ORDER — ONDANSETRON 2 MG/ML
8 INJECTION INTRAMUSCULAR; INTRAVENOUS
Status: CANCELLED
Start: 2019-11-07

## 2019-11-04 RX ORDER — GABAPENTIN 100 MG/1
100 CAPSULE ORAL 2 TIMES DAILY
Qty: 60 CAPSULE | Refills: 2 | Status: SHIPPED | OUTPATIENT
Start: 2019-11-04 | End: 2020-02-03

## 2019-11-04 RX ORDER — ONDANSETRON 2 MG/ML
8 INJECTION INTRAMUSCULAR; INTRAVENOUS
Status: CANCELLED
Start: 2019-11-06

## 2019-11-04 RX ORDER — AZACITIDINE 100 MG/1
75 INJECTION, POWDER, LYOPHILIZED, FOR SOLUTION INTRAVENOUS; SUBCUTANEOUS
Status: CANCELLED | OUTPATIENT
Start: 2019-11-05

## 2019-11-04 RX ORDER — DIPHENHYDRAMINE HCL 25 MG
25 CAPSULE ORAL
Status: COMPLETED | OUTPATIENT
Start: 2019-11-04 | End: 2019-11-04

## 2019-11-04 RX ORDER — AZACITIDINE 100 MG/1
75 INJECTION, POWDER, LYOPHILIZED, FOR SOLUTION INTRAVENOUS; SUBCUTANEOUS
Status: CANCELLED | OUTPATIENT
Start: 2019-11-08

## 2019-11-04 RX ORDER — ACETAMINOPHEN 325 MG/1
650 TABLET ORAL
Status: CANCELLED | OUTPATIENT
Start: 2019-11-04

## 2019-11-04 RX ORDER — DIPHENHYDRAMINE HCL 25 MG
25 CAPSULE ORAL
Status: CANCELLED | OUTPATIENT
Start: 2019-11-04

## 2019-11-04 RX ORDER — ONDANSETRON 2 MG/ML
8 INJECTION INTRAMUSCULAR; INTRAVENOUS
Status: CANCELLED
Start: 2019-11-08

## 2019-11-04 RX ORDER — AZACITIDINE 100 MG/1
75 INJECTION, POWDER, LYOPHILIZED, FOR SOLUTION INTRAVENOUS; SUBCUTANEOUS
Status: CANCELLED | OUTPATIENT
Start: 2019-11-04

## 2019-11-04 RX ORDER — ONDANSETRON 2 MG/ML
8 INJECTION INTRAMUSCULAR; INTRAVENOUS
Status: DISCONTINUED | OUTPATIENT
Start: 2019-11-04 | End: 2019-11-04 | Stop reason: HOSPADM

## 2019-11-04 RX ORDER — AZACITIDINE 100 MG/1
75 INJECTION, POWDER, LYOPHILIZED, FOR SOLUTION INTRAVENOUS; SUBCUTANEOUS
Status: COMPLETED | OUTPATIENT
Start: 2019-11-04 | End: 2019-11-04

## 2019-11-04 RX ORDER — ONDANSETRON 2 MG/ML
8 INJECTION INTRAMUSCULAR; INTRAVENOUS
Status: CANCELLED | OUTPATIENT
Start: 2019-11-04

## 2019-11-04 RX ORDER — CIPROFLOXACIN 500 MG/1
500 TABLET ORAL 2 TIMES DAILY
Qty: 60 TABLET | Refills: 0
Start: 2019-11-04 | End: 2019-12-04

## 2019-11-04 RX ORDER — HYDROCODONE BITARTRATE AND ACETAMINOPHEN 500; 5 MG/1; MG/1
TABLET ORAL ONCE
Status: CANCELLED | OUTPATIENT
Start: 2019-11-04 | End: 2019-11-04

## 2019-11-04 RX ORDER — AZACITIDINE 100 MG/1
75 INJECTION, POWDER, LYOPHILIZED, FOR SOLUTION INTRAVENOUS; SUBCUTANEOUS
Status: CANCELLED | OUTPATIENT
Start: 2019-11-06

## 2019-11-04 RX ADMIN — AZACITIDINE 150 MG: 100 INJECTION, POWDER, LYOPHILIZED, FOR SOLUTION INTRAVENOUS; SUBCUTANEOUS at 04:11

## 2019-11-04 RX ADMIN — ACETAMINOPHEN 650 MG: 325 TABLET ORAL at 02:11

## 2019-11-04 RX ADMIN — SODIUM CHLORIDE: 0.9 INJECTION, SOLUTION INTRAVENOUS at 02:11

## 2019-11-04 RX ADMIN — DIPHENHYDRAMINE HYDROCHLORIDE 25 MG: 25 CAPSULE ORAL at 02:11

## 2019-11-04 NOTE — PROGRESS NOTES
Subjective:       Patient ID: Susan Puente is a 69 y.o. female.    Chief Complaint: Follow-up (MDS)    HPI: Patient presents today for follow up of her history of MDS 5 q del syndrome prior to cycle 7 Vidaza as third line therapy for 5q minus syndrome. Will receive blood today for hgb 6.4 gm/dl. Had restaging bone marrow aspiration and biopsy on 10/24/19 revealing CELLULARITY=10-30%, TRILINEAGE HEMATOPOIETIC ACTIVITY (M/E= 1.3:1). PERSISTENT MYELODYSPLASTIC SYNDROME. SEE COMMENT. INCREASED NUMBER OF MEGAKARYOCYTES WITH DYSPLASTIC MORPHOLOGY.     She presents to clinic alone. She reports pain across the buttocks. States it started before Bm bx. She relates it to the Vidaza. Has been taking 1300 mg od Tylenol every 4 hours. Reports its a sharp pain and burning pain, worse with movement. She reports itching after vidaza but resolves. Denies nausea, diarrhea or mouth sores.     Oncology History   Ms. Puente is a 68 year old female with hx of DM2, peripheral vascular disease, tobacco use, CAD, hyperlipidemia, hypertension who was hospitalized 11/10/16 for anemia. hgb 5.3  MCH 43.4 with normal WBC and platelets. Patient had normal iron stores. She was transfused 3 units of PRBCs and discharged home with hgb 8.7 on 11/11/16. She was referred for further evalutation of her anemia. On 12/16/16 patient had a normal SPEP and immunofixation. Slightly elevated kappa light chains with normal ration. Elevated vitamin b12, normal folate, JAYDEN was positive with a low titer and negative profile. Patient had a bone marrow biopsy 1/5/16 which showed the core biopsy is normocellular for age (40%); however, megakaryocytes are increased and show frequent small, hypolobated forms. Additionally, a subset of the neutrophils are hypogranular. Blasts are not increased by either morphology (1.2%) or in the corresponding flow cytometric analysis. Fish detects a 5q deletion in 54.5% of nuclei. Cytogenetics reported 20 metaphases,  2 metaphases were normal and 18 metaphases had a 5q deletion. No additional cytogenetic abnormalities were detected. Findings consistent with 5q deletion syndrome.     Patient had a delay in obtaining Revlimid 10 mg daily but did start taking the medication 2/8/17. On 2/9/17 patient developed a diffuse maculopapular rash throughout scalp arms, legs and torso. She states she had no stridor or wheezing. She discontinued medication 2/15/17. Went to allergist who provided references on desensitization so that patient could resume Revlimid. Unfortunately developed pancytopenia and Revlimid stopped 6/16/17. She had a repeat BMBX  performed 6/8/17 and showed a hypercellular marrow (60%) continued atypia in the granulocytes and megakaryocytes were noted.  Additionally, there is erythroid atypia present. No increase in blasts. Cytogenetics are normal and MDS FISH is negative, failing to show 5 q minus. NGS should no significant molecular mutations.  Anemia work up revealed bienvenido negative hemolysis.    Revlimid has been stopped since June 2017 after she developed pancytopenia while on Revlimid (required desensitization). CBC was normal for almost 1 year and marrow was negative for del5q. Bone marrow biopsy repeat from 6/2018 showed relapsed 5q minus MDS without new or additional mutations. Revlimid restarted at 2.5 mg daily with prednisone to prevent allergic reaction. Titrated to a goal of 10mg daily Revlimid. Hospital admission 3/12-3/17/19 for unresponsive event after blood transfusion, found to be profoundly pancytopenic at admission. Since hospital admission Revlimid has been stopped. Repeat marrow March 2019 shows persistent MDS with 5q minus.     Review of Systems   Constitutional: Negative for fever, chills, weight loss, fatigue.   HENT: Negative for sinus congestion or rhinorrhea. Negative for ear pain, mouth sores, nosebleeds and trouble swallowing.    Respiratory: Negative for cough, shortness of breath and  wheezing.    Cardiovascular: Negative for chest pain and leg swelling.   Gastrointestinal: + constipation Negative for abdominal distention, abdominal pain, blood in stool, diarrhea, nausea and vomiting.   Endocrine: Negative for polyphagia and polyuria.   Genitourinary: Negative for dysuria, hematuria and urgency.   Musculoskeletal: Positive for arthralgias and myalgias.   Skin: Negative for color change, pallor and rash.   Neurological: Negative for dizziness, weakness, light-headedness and headaches.   Hematological: Negative for adenopathy. Does not bruise/bleed easily.   Psychiatric/Behavioral: Negative for agitation and behavioral problems.       Objective:      Physical Exam   Constitutional: She is oriented to person, place, and time. She appears well-developed and well-nourished.   Mouth/Throat: Oropharynx is clear and moist. No oropharyngeal exudate.   Eyes: Conjunctivae and EOM are normal. Right eye exhibits no discharge. Left eye exhibits no discharge.   Neck: Normal range of motion. Neck supple.   Cardiovascular: Normal rate, regular rhythm, normal heart sounds and intact distal pulses.   No murmur heard.  Pulmonary/Chest: Effort normal and breath sounds normal. No respiratory distress. She has no wheezes.   Abdominal: Soft. Bowel sounds are normal. She exhibits no distension and no mass. There is no tenderness.   Musculoskeletal: Normal range of motion. She exhibits no edema.   Neurological: She is alert and oriented to person, place, and time.   Skin: Skin is warm and dry. No rash noted.   Psychiatric: She has a normal mood and affect. Her behavior is normal. Judgment and thought content normal.   Nursing note and vitals reviewed.      Assessment:       1. MDS (myelodysplastic syndrome) with 5q deletion    2. Anemia requiring transfusions    3. Stage 3 chronic kidney disease    4. Controlled type 2 diabetes mellitus with stage 3 chronic kidney disease, without long-term current use of insulin    5.  Essential hypertension    6. Adjustment disorder with mixed anxiety and depressed mood    7. Bilateral sciatica    8. Transaminitis        Plan:     MDS  - Initially with 5 q minus syndrome and treated with Revlimid. Developed allergy and was then desensitized. Soon after developed pancytopenia and Revlimid held since June 2017.  - BMBX on 6/8/17 was with normal cytogenetics. 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 Discussed treatment options of HMA therapy or repeat trial of Revlimid and patient wished to proceed with Revlimid   - Revlimid stopped again due to repeat allergic reaction and pancytopenia 3/2019  - Started cycle 1 Vidaza 5/6/19 subq for 5 days every 28 days  - restaging bone marrow biopsy from 10/24 shows persistent MDS, n o excess blasts and 3 of 20 metaphases with 5q deletion  - Tolerated cycles 1-6 well. Package insert for Vidaza recommends holding if ANC <1000, however pt has been receiving this with an ANC below 1000 for each cycle. Will proceed with C7 today     Cytopenias 2/2 disease: anemia and leukopenia  - Transfuse for hgb < 7 or plts < 10K  - WBC 1.41; , hgb 6.4; plt normal  - Transfuse 1 unit prbc today in infusion center  - Continue ppx cipro, acyclovir, and fluconazole; refills sent today     Stage 3 CKD  - improved; continue to monitor with visits    DM2  - management per PCP  - continue metformin    HTN  - management per PCP  - continue lisinopril-HCTZ    CAD  - continue praluent for HLD per PCP (intolerant to statins)  - continue ASA    Anxiety/Depression  - Improved mood on Celexa. Continue    Transaminitis  - AST 46 and  today  - patient taking tylenol 1300 mg q4 for pain, discussed in detail. Will take q8 hours PRN     Myalgias/possible sciatica  - will trial gabapentin 100 mg bid  - decrease tylenol as above      F/U  - 1 unit prbcs in infusion center today  - CBC and type and screen weekly for possible blood product transfusions, on Mondays   - Return  visit with CBC, type and screen, CMP and appt with Dr. Valdes or NP on 12/2/2019. Chemo chair for cycle 8 vidaza 12/2/19 - 12/6/19      Beatriz King NP  Hematology/BMT

## 2019-11-04 NOTE — Clinical Note
- CBC and type and screen weekly for possible blood product transfusions, on Mondays - Return visit with CBC, type and screen, CMP and appt with Dr. Valdes or NP on 12/2/2019. Chemo chair for cycle 8 vidaza 12/2/19 - 12/6/19

## 2019-11-04 NOTE — PLAN OF CARE
Patient tolerated PRBC and Vidaza with no complications. VSS. Pt instructed to call MD with any problems. NAD. Pt discharged home independently.

## 2019-11-05 ENCOUNTER — INFUSION (OUTPATIENT)
Dept: INFUSION THERAPY | Facility: HOSPITAL | Age: 69
End: 2019-11-05
Attending: INTERNAL MEDICINE
Payer: MEDICARE

## 2019-11-05 VITALS — HEART RATE: 98 BPM | DIASTOLIC BLOOD PRESSURE: 56 MMHG | SYSTOLIC BLOOD PRESSURE: 120 MMHG

## 2019-11-05 DIAGNOSIS — D46.C MDS (MYELODYSPLASTIC SYNDROME) WITH 5Q DELETION: Primary | ICD-10-CM

## 2019-11-05 PROCEDURE — 63600175 PHARM REV CODE 636 W HCPCS: Mod: JW,JG,HCNC | Performed by: INTERNAL MEDICINE

## 2019-11-05 PROCEDURE — 96401 CHEMO ANTI-NEOPL SQ/IM: CPT | Mod: HCNC

## 2019-11-05 RX ORDER — AZACITIDINE 100 MG/1
75 INJECTION, POWDER, LYOPHILIZED, FOR SOLUTION INTRAVENOUS; SUBCUTANEOUS
Status: COMPLETED | OUTPATIENT
Start: 2019-11-05 | End: 2019-11-05

## 2019-11-05 RX ADMIN — AZACITIDINE 150 MG: 100 INJECTION, POWDER, LYOPHILIZED, FOR SOLUTION INTRAVENOUS; SUBCUTANEOUS at 02:11

## 2019-11-05 NOTE — NURSING
Patient here for vidaza injections-given in 3 divided doses-complains of back pain-tolerated well.

## 2019-11-06 ENCOUNTER — INFUSION (OUTPATIENT)
Dept: INFUSION THERAPY | Facility: HOSPITAL | Age: 69
End: 2019-11-06
Attending: INTERNAL MEDICINE
Payer: MEDICARE

## 2019-11-06 VITALS — DIASTOLIC BLOOD PRESSURE: 52 MMHG | SYSTOLIC BLOOD PRESSURE: 112 MMHG | HEART RATE: 101 BPM

## 2019-11-06 DIAGNOSIS — D46.C MDS (MYELODYSPLASTIC SYNDROME) WITH 5Q DELETION: Primary | ICD-10-CM

## 2019-11-06 PROCEDURE — 63600175 PHARM REV CODE 636 W HCPCS: Mod: JG,HCNC | Performed by: INTERNAL MEDICINE

## 2019-11-06 PROCEDURE — 96401 CHEMO ANTI-NEOPL SQ/IM: CPT | Mod: HCNC

## 2019-11-06 RX ORDER — AZACITIDINE 100 MG/1
75 INJECTION, POWDER, LYOPHILIZED, FOR SOLUTION INTRAVENOUS; SUBCUTANEOUS
Status: COMPLETED | OUTPATIENT
Start: 2019-11-06 | End: 2019-11-06

## 2019-11-06 RX ADMIN — AZACITIDINE 150 MG: 100 INJECTION, POWDER, LYOPHILIZED, FOR SOLUTION INTRAVENOUS; SUBCUTANEOUS at 02:11

## 2019-11-06 NOTE — NURSING
Patient here for vidaza injections-given in 3 divided doses-patient still with sciatic pain-tolerated well.

## 2019-11-07 ENCOUNTER — INFUSION (OUTPATIENT)
Dept: INFUSION THERAPY | Facility: HOSPITAL | Age: 69
End: 2019-11-07
Attending: INTERNAL MEDICINE
Payer: MEDICARE

## 2019-11-07 DIAGNOSIS — D46.C MDS (MYELODYSPLASTIC SYNDROME) WITH 5Q DELETION: Primary | ICD-10-CM

## 2019-11-07 PROCEDURE — 96401 CHEMO ANTI-NEOPL SQ/IM: CPT | Mod: HCNC

## 2019-11-07 PROCEDURE — 63600175 PHARM REV CODE 636 W HCPCS: Mod: JG,HCNC | Performed by: INTERNAL MEDICINE

## 2019-11-07 RX ORDER — ONDANSETRON 2 MG/ML
8 INJECTION INTRAMUSCULAR; INTRAVENOUS
Status: DISCONTINUED | OUTPATIENT
Start: 2019-11-07 | End: 2019-11-07 | Stop reason: HOSPADM

## 2019-11-07 RX ORDER — AZACITIDINE 100 MG/1
75 INJECTION, POWDER, LYOPHILIZED, FOR SOLUTION INTRAVENOUS; SUBCUTANEOUS
Status: COMPLETED | OUTPATIENT
Start: 2019-11-07 | End: 2019-11-07

## 2019-11-07 RX ADMIN — AZACITIDINE 150 MG: 100 INJECTION, POWDER, LYOPHILIZED, FOR SOLUTION INTRAVENOUS; SUBCUTANEOUS at 02:11

## 2019-11-08 ENCOUNTER — INFUSION (OUTPATIENT)
Dept: INFUSION THERAPY | Facility: HOSPITAL | Age: 69
End: 2019-11-08
Attending: INTERNAL MEDICINE
Payer: MEDICARE

## 2019-11-08 VITALS — RESPIRATION RATE: 18 BRPM

## 2019-11-08 DIAGNOSIS — D46.C MDS (MYELODYSPLASTIC SYNDROME) WITH 5Q DELETION: Primary | ICD-10-CM

## 2019-11-08 PROCEDURE — 63600175 PHARM REV CODE 636 W HCPCS: Mod: JG,HCNC | Performed by: INTERNAL MEDICINE

## 2019-11-08 PROCEDURE — 96401 CHEMO ANTI-NEOPL SQ/IM: CPT | Mod: HCNC

## 2019-11-08 RX ORDER — ONDANSETRON 2 MG/ML
8 INJECTION INTRAMUSCULAR; INTRAVENOUS
Status: DISCONTINUED | OUTPATIENT
Start: 2019-11-08 | End: 2019-11-08

## 2019-11-08 RX ORDER — AZACITIDINE 100 MG/1
75 INJECTION, POWDER, LYOPHILIZED, FOR SOLUTION INTRAVENOUS; SUBCUTANEOUS
Status: COMPLETED | OUTPATIENT
Start: 2019-11-08 | End: 2019-11-08

## 2019-11-08 RX ORDER — ONDANSETRON 2 MG/ML
8 INJECTION INTRAMUSCULAR; INTRAVENOUS
Status: DISCONTINUED | OUTPATIENT
Start: 2019-11-08 | End: 2019-11-08 | Stop reason: HOSPADM

## 2019-11-08 RX ADMIN — AZACITIDINE 150 MG: 100 INJECTION, POWDER, LYOPHILIZED, FOR SOLUTION INTRAVENOUS; SUBCUTANEOUS at 02:11

## 2019-11-08 NOTE — NURSING
1425- Patient arrived ambulatory to the unit for vidaza treatment.  Patient reports an increase in swelling and discomfort to foot overnight, instructed her to go to MD office following injections to get it checked.  Patient tolerated vidaza without issue and ambulated off the unit using walker.

## 2019-11-11 ENCOUNTER — PATIENT MESSAGE (OUTPATIENT)
Dept: HEMATOLOGY/ONCOLOGY | Facility: CLINIC | Age: 69
End: 2019-11-11

## 2019-11-11 ENCOUNTER — INFUSION (OUTPATIENT)
Dept: INFUSION THERAPY | Facility: HOSPITAL | Age: 69
End: 2019-11-11
Attending: INTERNAL MEDICINE
Payer: MEDICARE

## 2019-11-11 VITALS
RESPIRATION RATE: 18 BRPM | DIASTOLIC BLOOD PRESSURE: 64 MMHG | SYSTOLIC BLOOD PRESSURE: 145 MMHG | HEART RATE: 82 BPM | TEMPERATURE: 99 F

## 2019-11-11 DIAGNOSIS — D64.9 ANEMIA REQUIRING TRANSFUSIONS: ICD-10-CM

## 2019-11-11 DIAGNOSIS — D46.C MDS (MYELODYSPLASTIC SYNDROME) WITH 5Q DELETION: ICD-10-CM

## 2019-11-11 DIAGNOSIS — D64.9 ANEMIA REQUIRING TRANSFUSIONS: Primary | ICD-10-CM

## 2019-11-11 PROCEDURE — P9040 RBC LEUKOREDUCED IRRADIATED: HCPCS

## 2019-11-11 PROCEDURE — 36430 TRANSFUSION BLD/BLD COMPNT: CPT | Mod: HCNC

## 2019-11-11 PROCEDURE — 86920 COMPATIBILITY TEST SPIN: CPT

## 2019-11-11 PROCEDURE — 25000003 PHARM REV CODE 250: Mod: HCNC | Performed by: NURSE PRACTITIONER

## 2019-11-11 RX ORDER — DIPHENHYDRAMINE HCL 25 MG
25 CAPSULE ORAL
Status: CANCELLED | OUTPATIENT
Start: 2019-11-11

## 2019-11-11 RX ORDER — HYDROCODONE BITARTRATE AND ACETAMINOPHEN 500; 5 MG/1; MG/1
TABLET ORAL ONCE
Status: CANCELLED | OUTPATIENT
Start: 2019-11-11 | End: 2019-11-11

## 2019-11-11 RX ORDER — ACETAMINOPHEN 325 MG/1
650 TABLET ORAL
Status: COMPLETED | OUTPATIENT
Start: 2019-11-11 | End: 2019-11-11

## 2019-11-11 RX ORDER — DIPHENHYDRAMINE HCL 25 MG
25 CAPSULE ORAL
Status: COMPLETED | OUTPATIENT
Start: 2019-11-11 | End: 2019-11-11

## 2019-11-11 RX ORDER — ACETAMINOPHEN 325 MG/1
650 TABLET ORAL
Status: CANCELLED | OUTPATIENT
Start: 2019-11-11

## 2019-11-11 RX ORDER — HYDROCODONE BITARTRATE AND ACETAMINOPHEN 500; 5 MG/1; MG/1
TABLET ORAL ONCE
Status: DISCONTINUED | OUTPATIENT
Start: 2019-11-11 | End: 2019-11-11 | Stop reason: HOSPADM

## 2019-11-11 RX ADMIN — DIPHENHYDRAMINE HYDROCHLORIDE 25 MG: 25 CAPSULE ORAL at 02:11

## 2019-11-11 RX ADMIN — ACETAMINOPHEN 650 MG: 325 TABLET ORAL at 02:11

## 2019-11-11 NOTE — PLAN OF CARE
Tolerated 1u of PRBC well.  No reactions noted..  No questions or concerns at this time.  Ambulated off unit in NAD.

## 2019-11-18 ENCOUNTER — TELEPHONE (OUTPATIENT)
Dept: HEMATOLOGY/ONCOLOGY | Facility: CLINIC | Age: 69
End: 2019-11-18

## 2019-11-18 ENCOUNTER — LAB VISIT (OUTPATIENT)
Dept: LAB | Facility: HOSPITAL | Age: 69
End: 2019-11-18
Attending: INTERNAL MEDICINE
Payer: MEDICARE

## 2019-11-18 DIAGNOSIS — D46.C MDS (MYELODYSPLASTIC SYNDROME) WITH 5Q DELETION: ICD-10-CM

## 2019-11-18 DIAGNOSIS — D64.9 ANEMIA REQUIRING TRANSFUSIONS: Primary | ICD-10-CM

## 2019-11-18 LAB
ABO + RH BLD: NORMAL
ANISOCYTOSIS BLD QL SMEAR: SLIGHT
BASOPHILS # BLD AUTO: 0.01 K/UL (ref 0–0.2)
BASOPHILS NFR BLD: 0.4 % (ref 0–1.9)
BLD GP AB SCN CELLS X3 SERPL QL: NORMAL
DIFFERENTIAL METHOD: ABNORMAL
EOSINOPHIL # BLD AUTO: 0.1 K/UL (ref 0–0.5)
EOSINOPHIL NFR BLD: 3.4 % (ref 0–8)
ERYTHROCYTE [DISTWIDTH] IN BLOOD BY AUTOMATED COUNT: 17.9 % (ref 11.5–14.5)
HCT VFR BLD AUTO: 21.8 % (ref 37–48.5)
HGB BLD-MCNC: 7 G/DL (ref 12–16)
HYPOCHROMIA BLD QL SMEAR: ABNORMAL
IMM GRANULOCYTES # BLD AUTO: 0.03 K/UL (ref 0–0.04)
IMM GRANULOCYTES NFR BLD AUTO: 1.3 % (ref 0–0.5)
LYMPHOCYTES # BLD AUTO: 1 K/UL (ref 1–4.8)
LYMPHOCYTES NFR BLD: 44.8 % (ref 18–48)
MCH RBC QN AUTO: 30.7 PG (ref 27–31)
MCHC RBC AUTO-ENTMCNC: 32.1 G/DL (ref 32–36)
MCV RBC AUTO: 96 FL (ref 82–98)
MONOCYTES # BLD AUTO: 0.1 K/UL (ref 0.3–1)
MONOCYTES NFR BLD: 2.2 % (ref 4–15)
NEUTROPHILS # BLD AUTO: 1.1 K/UL (ref 1.8–7.7)
NEUTROPHILS NFR BLD: 47.9 % (ref 38–73)
NRBC BLD-RTO: 0 /100 WBC
OVALOCYTES BLD QL SMEAR: ABNORMAL
PLATELET # BLD AUTO: 60 K/UL (ref 150–350)
PMV BLD AUTO: 11.5 FL (ref 9.2–12.9)
POIKILOCYTOSIS BLD QL SMEAR: SLIGHT
POLYCHROMASIA BLD QL SMEAR: ABNORMAL
RBC # BLD AUTO: 2.28 M/UL (ref 4–5.4)
WBC # BLD AUTO: 2.32 K/UL (ref 3.9–12.7)

## 2019-11-18 PROCEDURE — 86850 RBC ANTIBODY SCREEN: CPT | Mod: HCNC

## 2019-11-18 PROCEDURE — 36415 COLL VENOUS BLD VENIPUNCTURE: CPT | Mod: HCNC

## 2019-11-18 PROCEDURE — 85025 COMPLETE CBC W/AUTO DIFF WBC: CPT | Mod: HCNC

## 2019-11-18 RX ORDER — ACETAMINOPHEN 325 MG/1
650 TABLET ORAL
Status: CANCELLED | OUTPATIENT
Start: 2019-11-18

## 2019-11-18 RX ORDER — HYDROCODONE BITARTRATE AND ACETAMINOPHEN 500; 5 MG/1; MG/1
TABLET ORAL ONCE
Status: CANCELLED | OUTPATIENT
Start: 2019-11-18 | End: 2019-11-18

## 2019-11-18 RX ORDER — DIPHENHYDRAMINE HCL 25 MG
25 CAPSULE ORAL
Status: CANCELLED | OUTPATIENT
Start: 2019-11-18

## 2019-11-18 NOTE — TELEPHONE ENCOUNTER
Spoke to patient. Scheduled appt for blood transfusion    ----- Message from Nica Franklin sent at 11/18/2019  2:56 PM CST -----  Contact: Susan  tel:   [phone]   Caller  Says  Needs to know the test results and if she needs to come in for a pint of blood or not.  Pls call.

## 2019-11-19 ENCOUNTER — INFUSION (OUTPATIENT)
Dept: INFUSION THERAPY | Facility: HOSPITAL | Age: 69
End: 2019-11-19
Attending: INTERNAL MEDICINE
Payer: MEDICARE

## 2019-11-19 VITALS
TEMPERATURE: 99 F | RESPIRATION RATE: 18 BRPM | HEART RATE: 80 BPM | SYSTOLIC BLOOD PRESSURE: 134 MMHG | DIASTOLIC BLOOD PRESSURE: 62 MMHG

## 2019-11-19 DIAGNOSIS — D64.9 ANEMIA REQUIRING TRANSFUSIONS: ICD-10-CM

## 2019-11-19 PROCEDURE — P9038 RBC IRRADIATED: HCPCS | Mod: HCNC

## 2019-11-19 PROCEDURE — 27201040 HC RC 50 FILTER: Mod: HCNC

## 2019-11-19 PROCEDURE — 36430 TRANSFUSION BLD/BLD COMPNT: CPT | Mod: HCNC

## 2019-11-19 PROCEDURE — 63600175 PHARM REV CODE 636 W HCPCS: Mod: HCNC | Performed by: NURSE PRACTITIONER

## 2019-11-19 PROCEDURE — 86920 COMPATIBILITY TEST SPIN: CPT | Mod: HCNC

## 2019-11-19 PROCEDURE — 25000003 PHARM REV CODE 250: Mod: HCNC | Performed by: NURSE PRACTITIONER

## 2019-11-19 RX ORDER — HYDROCODONE BITARTRATE AND ACETAMINOPHEN 500; 5 MG/1; MG/1
TABLET ORAL ONCE
Status: COMPLETED | OUTPATIENT
Start: 2019-11-19 | End: 2019-11-19

## 2019-11-19 RX ORDER — DIPHENHYDRAMINE HCL 25 MG
25 CAPSULE ORAL
Status: COMPLETED | OUTPATIENT
Start: 2019-11-19 | End: 2019-11-19

## 2019-11-19 RX ORDER — ACETAMINOPHEN 325 MG/1
650 TABLET ORAL
Status: COMPLETED | OUTPATIENT
Start: 2019-11-19 | End: 2019-11-19

## 2019-11-19 RX ADMIN — SODIUM CHLORIDE: 9 INJECTION, SOLUTION INTRAVENOUS at 02:11

## 2019-11-19 RX ADMIN — DIPHENHYDRAMINE HYDROCHLORIDE 25 MG: 25 CAPSULE ORAL at 02:11

## 2019-11-19 RX ADMIN — ACETAMINOPHEN 650 MG: 325 TABLET ORAL at 02:11

## 2019-11-19 NOTE — PLAN OF CARE
Pt tolerated 1 unit of PRBCs today. NAD. PIV flushed and removed. AVS given to pt. Discharged home. Ambulated independently with walker.

## 2019-11-19 NOTE — PLAN OF CARE
Problem: Adult Inpatient Plan of Care  Goal: Optimal Comfort and Wellbeing  Intervention: Provide Person-Centered Care  Flowsheets (Taken 11/19/2019 2616)  Trust Relationship/Rapport: care explained; thoughts/feelings acknowledged; choices provided; empathic listening provided; emotional support provided; questions answered; questions encouraged; reassurance provided

## 2019-11-25 ENCOUNTER — TELEPHONE (OUTPATIENT)
Dept: HEMATOLOGY/ONCOLOGY | Facility: CLINIC | Age: 69
End: 2019-11-25

## 2019-11-26 NOTE — PROGRESS NOTES
Subjective:       Patient ID: Susan Puente is a 69 y.o. female.    Chief Complaint: No chief complaint on file.    HPI: Patient presents today alone for follow up of her history of MDS 5 q del syndrome prior to cycle 8 Vidaza as third line therapy for 5q minus syndrome. She continues to have disease per recent BM bx 10/2019. She reports previous sharp sciatic pain much improved with small dose of gabapentin. She reports itching after vidaza but resolves with the use of argan oil and cows milk hygiene products. Denies fevers, chills, sob, chest pain.     Oncology History   Ms. Puente is a 68 year old female with hx of DM2, peripheral vascular disease, tobacco use, CAD, hyperlipidemia, hypertension who was hospitalized 11/10/16 for anemia. hgb 5.3  MCH 43.4 with normal WBC and platelets. Patient had normal iron stores. She was transfused 3 units of PRBCs and discharged home with hgb 8.7 on 11/11/16. She was referred for further evalutation of her anemia. On 12/16/16 patient had a normal SPEP and immunofixation. Slightly elevated kappa light chains with normal ration. Elevated vitamin b12, normal folate, JAYDEN was positive with a low titer and negative profile. Patient had a bone marrow biopsy 1/5/16 which showed the core biopsy is normocellular for age (40%); however, megakaryocytes are increased and show frequent small, hypolobated forms. Additionally, a subset of the neutrophils are hypogranular. Blasts are not increased by either morphology (1.2%) or in the corresponding flow cytometric analysis. Fish detects a 5q deletion in 54.5% of nuclei. Cytogenetics reported 20 metaphases, 2 metaphases were normal and 18 metaphases had a 5q deletion. No additional cytogenetic abnormalities were detected. Findings consistent with 5q deletion syndrome.     Patient had a delay in obtaining Revlimid 10 mg daily but did start taking the medication 2/8/17. On 2/9/17 patient developed a diffuse maculopapular rash  throughout scalp arms, legs and torso. She states she had no stridor or wheezing. She discontinued medication 2/15/17. Went to allergist who provided references on desensitization so that patient could resume Revlimid. Unfortunately developed pancytopenia and Revlimid stopped 6/16/17. She had a repeat BMBX  performed 6/8/17 and showed a hypercellular marrow (60%) continued atypia in the granulocytes and megakaryocytes were noted.  Additionally, there is erythroid atypia present. No increase in blasts. Cytogenetics are normal and MDS FISH is negative, failing to show 5 q minus. NGS should no significant molecular mutations.  Anemia work up revealed bienvenido negative hemolysis.    Revlimid has been stopped since June 2017 after she developed pancytopenia while on Revlimid (required desensitization). CBC was normal for almost 1 year and marrow was negative for del5q. Bone marrow biopsy repeat from 6/2018 showed relapsed 5q minus MDS without new or additional mutations. Revlimid restarted at 2.5 mg daily with prednisone to prevent allergic reaction. Titrated to a goal of 10mg daily Revlimid. Hospital admission 3/12-3/17/19 for unresponsive event after blood transfusion, found to be profoundly pancytopenic at admission. Since hospital admission Revlimid has been stopped. Repeat marrow March 2019 shows persistent MDS with 5q minus.     Started cycle 1 Vidaza 5/6/19 subq for 5 days every 28 days. Restaging bone marrow biopsy from 10/24/19 shows persistent MDS, no excess blasts and 3 of 20 metaphases with 5q deletion.    Review of Systems   Constitutional: Negative for fever, chills, weight loss, fatigue.   HENT: Negative for sinus congestion or rhinorrhea. Negative for ear pain, mouth sores, nosebleeds and trouble swallowing.    Respiratory: Negative for cough, shortness of breath and wheezing.    Cardiovascular: Negative for chest pain and leg swelling.   Gastrointestinal: + constipation Negative for abdominal distention,  abdominal pain, blood in stool, diarrhea, nausea and vomiting.   Endocrine: Negative for polyphagia and polyuria.   Genitourinary: Negative for dysuria, hematuria and urgency.   Musculoskeletal: Positive for sciatic pain much improved.   Skin: Negative for color change, pallor and rash.   Neurological: Negative for dizziness, weakness, light-headedness and headaches.   Hematological: Negative for adenopathy. Does not bruise/bleed easily.   Psychiatric/Behavioral: Negative for agitation and behavioral problems.       Objective:      Physical Exam   Constitutional: She is oriented to person, place, and time. She appears well-developed and well-nourished.   Mouth/Throat: Oropharynx is clear and moist. No oropharyngeal exudate.   Eyes: Conjunctivae and EOM are normal. Right eye exhibits no discharge. Left eye exhibits no discharge.   Neck: Normal range of motion. Neck supple.   Cardiovascular: Normal rate, regular rhythm, normal heart sounds and intact distal pulses.   No murmur heard.  Pulmonary/Chest: Effort normal and breath sounds normal. No respiratory distress. She has no wheezes.   Abdominal: Soft. Bowel sounds are normal. She exhibits no distension and no mass. There is no tenderness.   Musculoskeletal: Normal range of motion. She exhibits no edema.   Neurological: She is alert and oriented to person, place, and time.   Skin: Skin is warm and dry. No rash noted.   Psychiatric: She has a normal mood and affect. Her behavior is normal. Judgment and thought content normal.   Nursing note and vitals reviewed.      Assessment:       1. MDS (myelodysplastic syndrome) with 5q deletion    2. Anemia requiring transfusions    3. Leukopenia due to antineoplastic chemotherapy    4. Stage 3 chronic kidney disease    5. Controlled type 2 diabetes mellitus with stage 3 chronic kidney disease, without long-term current use of insulin    6. Essential hypertension    7. Coronary artery disease, angina presence unspecified,  unspecified vessel or lesion type, unspecified whether native or transplanted heart    8. Adjustment disorder with mixed anxiety and depressed mood    9. Myalgia        Plan:     MDS  - Initially with 5 q minus syndrome and treated with Revlimid. Developed allergy and was then desensitized. Soon after developed pancytopenia and Revlimid held since June 2017.    - BMBX on 6/8/17 was with normal cytogenetics. 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 Discussed treatment options of HMA therapy or repeat trial of Revlimid and patient wished to proceed with Revlimid   - Revlimid stopped again due to repeat allergic reaction and pancytopenia 3/2019  - Started cycle 1 Vidaza 5/6/19 subq for 5 days every 28 days  - Restaging bone marrow biopsy from 10/24/19 shows persistent MDS, no excess blasts and 3 of 20 metaphases with 5q deletion  - Tolerated cycles 1-7 well. Package insert for Vidaza recommends holding if ANC <1000, however pt has been receiving this with an ANC below 1000 for each cycle. Per Dr. Valdes, continue to give despite neutropenia. Will proceed with C8 today     Cytopenias 2/2 disease: anemia and leukopenia  - Transfuse for hgb < 7 or plts < 10K  - WBC 1.87; ANC improved to 548, hgb 7.1; plt normal  - Continue ppx cipro, acyclovir, and fluconazole    DM2  - management per PCP  - continue metformin    HTN  - management per PCP  - continue lisinopril-HCTZ    CAD  - continue praluent for HLD per PCP (intolerant to statins)  - continue ASA    Adjustment disorder  - Improved mood on Celexa. Continue    Myalgias/possible sciatica  - started trial of gabapentin 100 mg bid 11/4/19, much improved  - decreased tylenol previously    Follow up:  - Has vidaza scheduled through 12/6  - Schedule CBC and type and screen weekly on Mondays for possible transfusions  - Schedule return visit with CBC, type and screen, CMP and appt with Dr. Valdes or NP 1/6   - Schedule chemo chair for cycle 9 vidaza 1/6/19 through  1/10/19    Bernadette Ruiz DNP, NP  Hematology/Oncology

## 2019-11-29 ENCOUNTER — TELEPHONE (OUTPATIENT)
Dept: HEMATOLOGY/ONCOLOGY | Facility: CLINIC | Age: 69
End: 2019-11-29

## 2019-12-02 ENCOUNTER — OFFICE VISIT (OUTPATIENT)
Dept: HEMATOLOGY/ONCOLOGY | Facility: CLINIC | Age: 69
End: 2019-12-02
Payer: MEDICARE

## 2019-12-02 ENCOUNTER — TELEPHONE (OUTPATIENT)
Dept: HEMATOLOGY/ONCOLOGY | Facility: CLINIC | Age: 69
End: 2019-12-02

## 2019-12-02 ENCOUNTER — LAB VISIT (OUTPATIENT)
Dept: LAB | Facility: HOSPITAL | Age: 69
End: 2019-12-02
Attending: INTERNAL MEDICINE
Payer: MEDICARE

## 2019-12-02 ENCOUNTER — INFUSION (OUTPATIENT)
Dept: INFUSION THERAPY | Facility: HOSPITAL | Age: 69
End: 2019-12-02
Attending: INTERNAL MEDICINE
Payer: MEDICARE

## 2019-12-02 VITALS
SYSTOLIC BLOOD PRESSURE: 150 MMHG | HEART RATE: 88 BPM | DIASTOLIC BLOOD PRESSURE: 68 MMHG | HEIGHT: 65 IN | OXYGEN SATURATION: 95 % | TEMPERATURE: 98 F | BODY MASS INDEX: 31 KG/M2 | RESPIRATION RATE: 16 BRPM | WEIGHT: 186.06 LBS

## 2019-12-02 VITALS
DIASTOLIC BLOOD PRESSURE: 60 MMHG | OXYGEN SATURATION: 95 % | BODY MASS INDEX: 31 KG/M2 | HEIGHT: 65 IN | HEART RATE: 90 BPM | WEIGHT: 186.06 LBS | TEMPERATURE: 98 F | RESPIRATION RATE: 17 BRPM | SYSTOLIC BLOOD PRESSURE: 140 MMHG

## 2019-12-02 DIAGNOSIS — I25.10 CORONARY ARTERY DISEASE, ANGINA PRESENCE UNSPECIFIED, UNSPECIFIED VESSEL OR LESION TYPE, UNSPECIFIED WHETHER NATIVE OR TRANSPLANTED HEART: ICD-10-CM

## 2019-12-02 DIAGNOSIS — E11.22 CONTROLLED TYPE 2 DIABETES MELLITUS WITH STAGE 3 CHRONIC KIDNEY DISEASE, WITHOUT LONG-TERM CURRENT USE OF INSULIN: ICD-10-CM

## 2019-12-02 DIAGNOSIS — D46.C MDS (MYELODYSPLASTIC SYNDROME) WITH 5Q DELETION: ICD-10-CM

## 2019-12-02 DIAGNOSIS — I10 ESSENTIAL HYPERTENSION: ICD-10-CM

## 2019-12-02 DIAGNOSIS — N18.30 CONTROLLED TYPE 2 DIABETES MELLITUS WITH STAGE 3 CHRONIC KIDNEY DISEASE, WITHOUT LONG-TERM CURRENT USE OF INSULIN: ICD-10-CM

## 2019-12-02 DIAGNOSIS — T45.1X5A LEUKOPENIA DUE TO ANTINEOPLASTIC CHEMOTHERAPY: ICD-10-CM

## 2019-12-02 DIAGNOSIS — D46.C MDS (MYELODYSPLASTIC SYNDROME) WITH 5Q DELETION: Primary | ICD-10-CM

## 2019-12-02 DIAGNOSIS — D70.1 LEUKOPENIA DUE TO ANTINEOPLASTIC CHEMOTHERAPY: ICD-10-CM

## 2019-12-02 DIAGNOSIS — M79.10 MYALGIA: ICD-10-CM

## 2019-12-02 DIAGNOSIS — D64.9 ANEMIA REQUIRING TRANSFUSIONS: ICD-10-CM

## 2019-12-02 DIAGNOSIS — N18.30 STAGE 3 CHRONIC KIDNEY DISEASE: ICD-10-CM

## 2019-12-02 DIAGNOSIS — F43.23 ADJUSTMENT DISORDER WITH MIXED ANXIETY AND DEPRESSED MOOD: ICD-10-CM

## 2019-12-02 LAB
ABO + RH BLD: NORMAL
ANISOCYTOSIS BLD QL SMEAR: SLIGHT
BASO STIPL BLD QL SMEAR: ABNORMAL
BASOPHILS NFR BLD: 0 % (ref 0–1.9)
BLD GP AB SCN CELLS X3 SERPL QL: NORMAL
DIFFERENTIAL METHOD: ABNORMAL
EOSINOPHIL NFR BLD: 11 % (ref 0–8)
ERYTHROCYTE [DISTWIDTH] IN BLOOD BY AUTOMATED COUNT: 19.8 % (ref 11.5–14.5)
GIANT PLATELETS BLD QL SMEAR: PRESENT
HCT VFR BLD AUTO: 21.9 % (ref 37–48.5)
HGB BLD-MCNC: 7.1 G/DL (ref 12–16)
IMM GRANULOCYTES # BLD AUTO: ABNORMAL K/UL (ref 0–0.04)
IMM GRANULOCYTES NFR BLD AUTO: ABNORMAL % (ref 0–0.5)
LYMPHOCYTES NFR BLD: 55 % (ref 18–48)
MCH RBC QN AUTO: 31 PG (ref 27–31)
MCHC RBC AUTO-ENTMCNC: 32.4 G/DL (ref 32–36)
MCV RBC AUTO: 96 FL (ref 82–98)
MONOCYTES NFR BLD: 5 % (ref 4–15)
NEUTROPHILS NFR BLD: 29 % (ref 38–73)
NRBC BLD-RTO: 0 /100 WBC
OVALOCYTES BLD QL SMEAR: ABNORMAL
PLATELET # BLD AUTO: 279 K/UL (ref 150–350)
PLATELET BLD QL SMEAR: ABNORMAL
PMV BLD AUTO: 11.4 FL (ref 9.2–12.9)
POIKILOCYTOSIS BLD QL SMEAR: SLIGHT
POLYCHROMASIA BLD QL SMEAR: ABNORMAL
RBC # BLD AUTO: 2.29 M/UL (ref 4–5.4)
SMUDGE CELLS BLD QL SMEAR: PRESENT
WBC # BLD AUTO: 1.87 K/UL (ref 3.9–12.7)

## 2019-12-02 PROCEDURE — 3077F SYST BP >= 140 MM HG: CPT | Mod: HCNC,CPTII,S$GLB, | Performed by: NURSE PRACTITIONER

## 2019-12-02 PROCEDURE — 63600175 PHARM REV CODE 636 W HCPCS: Mod: JG,HCNC | Performed by: INTERNAL MEDICINE

## 2019-12-02 PROCEDURE — 3078F DIAST BP <80 MM HG: CPT | Mod: HCNC,CPTII,S$GLB, | Performed by: NURSE PRACTITIONER

## 2019-12-02 PROCEDURE — 1101F PT FALLS ASSESS-DOCD LE1/YR: CPT | Mod: HCNC,CPTII,S$GLB, | Performed by: NURSE PRACTITIONER

## 2019-12-02 PROCEDURE — 99999 PR PBB SHADOW E&M-EST. PATIENT-LVL III: ICD-10-PCS | Mod: PBBFAC,HCNC,, | Performed by: NURSE PRACTITIONER

## 2019-12-02 PROCEDURE — 1159F MED LIST DOCD IN RCRD: CPT | Mod: HCNC,S$GLB,, | Performed by: NURSE PRACTITIONER

## 2019-12-02 PROCEDURE — 99499 RISK ADDL DX/OHS AUDIT: ICD-10-PCS | Mod: HCNC,S$GLB,, | Performed by: NURSE PRACTITIONER

## 2019-12-02 PROCEDURE — 1126F AMNT PAIN NOTED NONE PRSNT: CPT | Mod: HCNC,S$GLB,, | Performed by: NURSE PRACTITIONER

## 2019-12-02 PROCEDURE — 85007 BL SMEAR W/DIFF WBC COUNT: CPT | Mod: HCNC,NCS

## 2019-12-02 PROCEDURE — 96401 CHEMO ANTI-NEOPL SQ/IM: CPT | Mod: HCNC

## 2019-12-02 PROCEDURE — 1159F PR MEDICATION LIST DOCUMENTED IN MEDICAL RECORD: ICD-10-PCS | Mod: HCNC,S$GLB,, | Performed by: NURSE PRACTITIONER

## 2019-12-02 PROCEDURE — 3078F PR MOST RECENT DIASTOLIC BLOOD PRESSURE < 80 MM HG: ICD-10-PCS | Mod: HCNC,CPTII,S$GLB, | Performed by: NURSE PRACTITIONER

## 2019-12-02 PROCEDURE — 99215 OFFICE O/P EST HI 40 MIN: CPT | Mod: HCNC,S$GLB,, | Performed by: NURSE PRACTITIONER

## 2019-12-02 PROCEDURE — 1126F PR PAIN SEVERITY QUANTIFIED, NO PAIN PRESENT: ICD-10-PCS | Mod: HCNC,S$GLB,, | Performed by: NURSE PRACTITIONER

## 2019-12-02 PROCEDURE — 99499 UNLISTED E&M SERVICE: CPT | Mod: HCNC,S$GLB,, | Performed by: NURSE PRACTITIONER

## 2019-12-02 PROCEDURE — 3077F PR MOST RECENT SYSTOLIC BLOOD PRESSURE >= 140 MM HG: ICD-10-PCS | Mod: HCNC,CPTII,S$GLB, | Performed by: NURSE PRACTITIONER

## 2019-12-02 PROCEDURE — 86850 RBC ANTIBODY SCREEN: CPT | Mod: HCNC

## 2019-12-02 PROCEDURE — 99215 PR OFFICE/OUTPT VISIT, EST, LEVL V, 40-54 MIN: ICD-10-PCS | Mod: HCNC,S$GLB,, | Performed by: NURSE PRACTITIONER

## 2019-12-02 PROCEDURE — 1101F PR PT FALLS ASSESS DOC 0-1 FALLS W/OUT INJ PAST YR: ICD-10-PCS | Mod: HCNC,CPTII,S$GLB, | Performed by: NURSE PRACTITIONER

## 2019-12-02 PROCEDURE — 85027 COMPLETE CBC AUTOMATED: CPT | Mod: HCNC

## 2019-12-02 PROCEDURE — 99999 PR PBB SHADOW E&M-EST. PATIENT-LVL III: CPT | Mod: PBBFAC,HCNC,, | Performed by: NURSE PRACTITIONER

## 2019-12-02 PROCEDURE — 36415 COLL VENOUS BLD VENIPUNCTURE: CPT | Mod: HCNC

## 2019-12-02 RX ORDER — ONDANSETRON 2 MG/ML
8 INJECTION INTRAMUSCULAR; INTRAVENOUS
Status: CANCELLED | OUTPATIENT
Start: 2019-12-03

## 2019-12-02 RX ORDER — ONDANSETRON 2 MG/ML
8 INJECTION INTRAMUSCULAR; INTRAVENOUS
Status: CANCELLED | OUTPATIENT
Start: 2019-12-02

## 2019-12-02 RX ORDER — AZACITIDINE 100 MG/1
75 INJECTION, POWDER, LYOPHILIZED, FOR SOLUTION INTRAVENOUS; SUBCUTANEOUS
Status: CANCELLED | OUTPATIENT
Start: 2019-12-02

## 2019-12-02 RX ORDER — AZACITIDINE 100 MG/1
75 INJECTION, POWDER, LYOPHILIZED, FOR SOLUTION INTRAVENOUS; SUBCUTANEOUS
Status: CANCELLED | OUTPATIENT
Start: 2019-12-06

## 2019-12-02 RX ORDER — AZACITIDINE 100 MG/1
75 INJECTION, POWDER, LYOPHILIZED, FOR SOLUTION INTRAVENOUS; SUBCUTANEOUS
Status: COMPLETED | OUTPATIENT
Start: 2019-12-02 | End: 2019-12-02

## 2019-12-02 RX ORDER — AZACITIDINE 100 MG/1
75 INJECTION, POWDER, LYOPHILIZED, FOR SOLUTION INTRAVENOUS; SUBCUTANEOUS
Status: CANCELLED | OUTPATIENT
Start: 2019-12-03

## 2019-12-02 RX ORDER — ONDANSETRON 2 MG/ML
8 INJECTION INTRAMUSCULAR; INTRAVENOUS
Status: CANCELLED | OUTPATIENT
Start: 2019-12-06

## 2019-12-02 RX ORDER — AZACITIDINE 100 MG/1
75 INJECTION, POWDER, LYOPHILIZED, FOR SOLUTION INTRAVENOUS; SUBCUTANEOUS
Status: CANCELLED | OUTPATIENT
Start: 2019-12-05

## 2019-12-02 RX ORDER — ONDANSETRON 2 MG/ML
8 INJECTION INTRAMUSCULAR; INTRAVENOUS
Status: CANCELLED
Start: 2019-12-04

## 2019-12-02 RX ORDER — ONDANSETRON 2 MG/ML
8 INJECTION INTRAMUSCULAR; INTRAVENOUS
Status: CANCELLED | OUTPATIENT
Start: 2019-12-05

## 2019-12-02 RX ORDER — AZACITIDINE 100 MG/1
75 INJECTION, POWDER, LYOPHILIZED, FOR SOLUTION INTRAVENOUS; SUBCUTANEOUS
Status: CANCELLED | OUTPATIENT
Start: 2019-12-04

## 2019-12-02 RX ADMIN — AZACITIDINE 150 MG: 100 INJECTION, POWDER, LYOPHILIZED, FOR SOLUTION INTRAVENOUS; SUBCUTANEOUS at 12:12

## 2019-12-02 NOTE — Clinical Note
- Schedule CBC and type and screen weekly on Mondays for possible transfusions- Schedule return visit with CBC, type and screen, CMP and appt with Dr. Valdes or NP 1/6 - Schedule chemo chair for cycle 9 vidaza 1/6/19 through 1/10/19

## 2019-12-02 NOTE — PLAN OF CARE
Pt ambulatory to clinic with assist from walker. Pt states does not need blood today and only Vidaza. Pleasant and talkative. Pt declined the zofran as ordered. States took her own PTA. Pt tolerated injections x three well. Bandaid applied to site to RLQ abd. Ambulatory from unit in [facility].

## 2019-12-03 ENCOUNTER — INFUSION (OUTPATIENT)
Dept: INFUSION THERAPY | Facility: HOSPITAL | Age: 69
End: 2019-12-03
Attending: INTERNAL MEDICINE
Payer: MEDICARE

## 2019-12-03 VITALS — HEART RATE: 95 BPM | SYSTOLIC BLOOD PRESSURE: 138 MMHG | DIASTOLIC BLOOD PRESSURE: 64 MMHG

## 2019-12-03 DIAGNOSIS — D46.C MDS (MYELODYSPLASTIC SYNDROME) WITH 5Q DELETION: Primary | ICD-10-CM

## 2019-12-03 PROCEDURE — 63600175 PHARM REV CODE 636 W HCPCS: Mod: HCNC | Performed by: INTERNAL MEDICINE

## 2019-12-03 PROCEDURE — 96401 CHEMO ANTI-NEOPL SQ/IM: CPT | Mod: HCNC

## 2019-12-03 PROCEDURE — 25000003 PHARM REV CODE 250: Mod: HCNC | Performed by: INTERNAL MEDICINE

## 2019-12-03 RX ORDER — ONDANSETRON 2 MG/ML
8 INJECTION INTRAMUSCULAR; INTRAVENOUS
Status: DISCONTINUED | OUTPATIENT
Start: 2019-12-03 | End: 2019-12-03

## 2019-12-03 RX ORDER — AZACITIDINE 100 MG/1
75 INJECTION, POWDER, LYOPHILIZED, FOR SOLUTION INTRAVENOUS; SUBCUTANEOUS
Status: COMPLETED | OUTPATIENT
Start: 2019-12-03 | End: 2019-12-03

## 2019-12-03 RX ORDER — ONDANSETRON 4 MG/1
8 TABLET, ORALLY DISINTEGRATING ORAL ONCE
Status: COMPLETED | OUTPATIENT
Start: 2019-12-03 | End: 2019-12-03

## 2019-12-03 RX ADMIN — AZACITIDINE 150 MG: 100 INJECTION, POWDER, LYOPHILIZED, FOR SOLUTION INTRAVENOUS; SUBCUTANEOUS at 03:12

## 2019-12-03 RX ADMIN — ONDANSETRON 8 MG: 2 INJECTION INTRAMUSCULAR; INTRAVENOUS at 02:12

## 2019-12-03 RX ADMIN — ONDANSETRON 8 MG: 4 TABLET, ORALLY DISINTEGRATING ORAL at 03:12

## 2019-12-03 NOTE — NURSING
Patient here for vidaza injections-given in 3 divided doses-only complaint is fatigue-tolerated well.

## 2019-12-04 ENCOUNTER — INFUSION (OUTPATIENT)
Dept: INFUSION THERAPY | Facility: HOSPITAL | Age: 69
End: 2019-12-04
Attending: INTERNAL MEDICINE
Payer: MEDICARE

## 2019-12-04 VITALS — HEART RATE: 96 BPM | DIASTOLIC BLOOD PRESSURE: 46 MMHG | SYSTOLIC BLOOD PRESSURE: 101 MMHG

## 2019-12-04 DIAGNOSIS — D46.C MDS (MYELODYSPLASTIC SYNDROME) WITH 5Q DELETION: Primary | ICD-10-CM

## 2019-12-04 PROCEDURE — 63600175 PHARM REV CODE 636 W HCPCS: Mod: JG,HCNC | Performed by: INTERNAL MEDICINE

## 2019-12-04 PROCEDURE — 96401 CHEMO ANTI-NEOPL SQ/IM: CPT | Mod: HCNC

## 2019-12-04 RX ORDER — ONDANSETRON 2 MG/ML
8 INJECTION INTRAMUSCULAR; INTRAVENOUS
Status: DISCONTINUED | OUTPATIENT
Start: 2019-12-04 | End: 2019-12-04 | Stop reason: HOSPADM

## 2019-12-04 RX ORDER — AZACITIDINE 100 MG/1
75 INJECTION, POWDER, LYOPHILIZED, FOR SOLUTION INTRAVENOUS; SUBCUTANEOUS
Status: COMPLETED | OUTPATIENT
Start: 2019-12-04 | End: 2019-12-04

## 2019-12-04 RX ADMIN — AZACITIDINE 150 MG: 100 INJECTION, POWDER, LYOPHILIZED, FOR SOLUTION INTRAVENOUS; SUBCUTANEOUS at 03:12

## 2019-12-05 ENCOUNTER — INFUSION (OUTPATIENT)
Dept: INFUSION THERAPY | Facility: HOSPITAL | Age: 69
End: 2019-12-05
Attending: INTERNAL MEDICINE
Payer: MEDICARE

## 2019-12-05 VITALS — HEART RATE: 99 BPM | SYSTOLIC BLOOD PRESSURE: 119 MMHG | DIASTOLIC BLOOD PRESSURE: 59 MMHG | RESPIRATION RATE: 18 BRPM

## 2019-12-05 DIAGNOSIS — D46.C MDS (MYELODYSPLASTIC SYNDROME) WITH 5Q DELETION: Primary | ICD-10-CM

## 2019-12-05 PROCEDURE — 63600175 PHARM REV CODE 636 W HCPCS: Mod: JG,HCNC | Performed by: INTERNAL MEDICINE

## 2019-12-05 PROCEDURE — 96401 CHEMO ANTI-NEOPL SQ/IM: CPT | Mod: HCNC

## 2019-12-05 RX ORDER — ONDANSETRON 2 MG/ML
8 INJECTION INTRAMUSCULAR; INTRAVENOUS
Status: DISCONTINUED | OUTPATIENT
Start: 2019-12-05 | End: 2019-12-05

## 2019-12-05 RX ORDER — ONDANSETRON 2 MG/ML
8 INJECTION INTRAMUSCULAR; INTRAVENOUS
Status: DISCONTINUED | OUTPATIENT
Start: 2019-12-05 | End: 2019-12-05 | Stop reason: HOSPADM

## 2019-12-05 RX ORDER — AZACITIDINE 100 MG/1
75 INJECTION, POWDER, LYOPHILIZED, FOR SOLUTION INTRAVENOUS; SUBCUTANEOUS
Status: COMPLETED | OUTPATIENT
Start: 2019-12-05 | End: 2019-12-05

## 2019-12-05 RX ADMIN — AZACITIDINE 150 MG: 100 INJECTION, POWDER, LYOPHILIZED, FOR SOLUTION INTRAVENOUS; SUBCUTANEOUS at 03:12

## 2019-12-06 ENCOUNTER — INFUSION (OUTPATIENT)
Dept: INFUSION THERAPY | Facility: HOSPITAL | Age: 69
End: 2019-12-06
Attending: INTERNAL MEDICINE
Payer: MEDICARE

## 2019-12-06 VITALS — HEART RATE: 114 BPM | SYSTOLIC BLOOD PRESSURE: 97 MMHG | RESPIRATION RATE: 18 BRPM | DIASTOLIC BLOOD PRESSURE: 52 MMHG

## 2019-12-06 DIAGNOSIS — D46.C MDS (MYELODYSPLASTIC SYNDROME) WITH 5Q DELETION: Primary | ICD-10-CM

## 2019-12-06 PROCEDURE — 96401 CHEMO ANTI-NEOPL SQ/IM: CPT | Mod: HCNC

## 2019-12-06 PROCEDURE — 63600175 PHARM REV CODE 636 W HCPCS: Mod: JG,HCNC | Performed by: INTERNAL MEDICINE

## 2019-12-06 RX ORDER — ONDANSETRON 2 MG/ML
8 INJECTION INTRAMUSCULAR; INTRAVENOUS
Status: DISCONTINUED | OUTPATIENT
Start: 2019-12-06 | End: 2019-12-06 | Stop reason: HOSPADM

## 2019-12-06 RX ORDER — AZACITIDINE 100 MG/1
75 INJECTION, POWDER, LYOPHILIZED, FOR SOLUTION INTRAVENOUS; SUBCUTANEOUS
Status: COMPLETED | OUTPATIENT
Start: 2019-12-06 | End: 2019-12-06

## 2019-12-06 RX ADMIN — AZACITIDINE 150 MG: 100 INJECTION, POWDER, LYOPHILIZED, FOR SOLUTION INTRAVENOUS; SUBCUTANEOUS at 02:12

## 2019-12-09 ENCOUNTER — INFUSION (OUTPATIENT)
Dept: INFUSION THERAPY | Facility: HOSPITAL | Age: 69
End: 2019-12-09
Attending: INTERNAL MEDICINE
Payer: MEDICARE

## 2019-12-09 ENCOUNTER — TELEPHONE (OUTPATIENT)
Dept: HEMATOLOGY/ONCOLOGY | Facility: CLINIC | Age: 69
End: 2019-12-09

## 2019-12-09 VITALS
DIASTOLIC BLOOD PRESSURE: 60 MMHG | RESPIRATION RATE: 18 BRPM | TEMPERATURE: 98 F | SYSTOLIC BLOOD PRESSURE: 150 MMHG | HEART RATE: 75 BPM

## 2019-12-09 DIAGNOSIS — D46.C MDS (MYELODYSPLASTIC SYNDROME) WITH 5Q DELETION: Primary | ICD-10-CM

## 2019-12-09 DIAGNOSIS — D46.C MDS (MYELODYSPLASTIC SYNDROME) WITH 5Q DELETION: ICD-10-CM

## 2019-12-09 PROCEDURE — 86920 COMPATIBILITY TEST SPIN: CPT | Mod: HCNC

## 2019-12-09 PROCEDURE — P9040 RBC LEUKOREDUCED IRRADIATED: HCPCS | Mod: HCNC

## 2019-12-09 PROCEDURE — 36430 TRANSFUSION BLD/BLD COMPNT: CPT | Mod: HCNC

## 2019-12-09 PROCEDURE — 25000003 PHARM REV CODE 250: Mod: HCNC | Performed by: INTERNAL MEDICINE

## 2019-12-09 RX ORDER — FUROSEMIDE 10 MG/ML
20 INJECTION INTRAMUSCULAR; INTRAVENOUS
Status: CANCELLED | OUTPATIENT
Start: 2019-12-09

## 2019-12-09 RX ORDER — ACETAMINOPHEN 325 MG/1
650 TABLET ORAL
Status: COMPLETED | OUTPATIENT
Start: 2019-12-09 | End: 2019-12-09

## 2019-12-09 RX ORDER — HYDROCODONE BITARTRATE AND ACETAMINOPHEN 500; 5 MG/1; MG/1
TABLET ORAL ONCE
Status: CANCELLED | OUTPATIENT
Start: 2019-12-09 | End: 2019-12-09

## 2019-12-09 RX ORDER — DIPHENHYDRAMINE HCL 25 MG
25 CAPSULE ORAL
Status: COMPLETED | OUTPATIENT
Start: 2019-12-09 | End: 2019-12-09

## 2019-12-09 RX ORDER — DIPHENHYDRAMINE HCL 25 MG
25 CAPSULE ORAL
Status: CANCELLED | OUTPATIENT
Start: 2019-12-09

## 2019-12-09 RX ORDER — HYDROCODONE BITARTRATE AND ACETAMINOPHEN 500; 5 MG/1; MG/1
TABLET ORAL ONCE
Status: DISCONTINUED | OUTPATIENT
Start: 2019-12-09 | End: 2019-12-09 | Stop reason: HOSPADM

## 2019-12-09 RX ORDER — FUROSEMIDE 10 MG/ML
20 INJECTION INTRAMUSCULAR; INTRAVENOUS
Status: DISCONTINUED | OUTPATIENT
Start: 2019-12-09 | End: 2019-12-09 | Stop reason: HOSPADM

## 2019-12-09 RX ORDER — ACETAMINOPHEN 325 MG/1
650 TABLET ORAL
Status: CANCELLED | OUTPATIENT
Start: 2019-12-09

## 2019-12-09 RX ADMIN — DIPHENHYDRAMINE HYDROCHLORIDE 25 MG: 25 CAPSULE ORAL at 01:12

## 2019-12-09 RX ADMIN — ACETAMINOPHEN 650 MG: 325 TABLET ORAL at 01:12

## 2019-12-09 NOTE — PLAN OF CARE
Tolerated 1u PRBC well.  No reactions noted.  No questions or concerns at this time.  Ambulated off unit in NAD.

## 2019-12-23 ENCOUNTER — INFUSION (OUTPATIENT)
Dept: INFUSION THERAPY | Facility: HOSPITAL | Age: 69
End: 2019-12-23
Attending: INTERNAL MEDICINE
Payer: MEDICARE

## 2019-12-23 ENCOUNTER — TELEPHONE (OUTPATIENT)
Dept: HEMATOLOGY/ONCOLOGY | Facility: CLINIC | Age: 69
End: 2019-12-23

## 2019-12-23 ENCOUNTER — PATIENT MESSAGE (OUTPATIENT)
Dept: HEMATOLOGY/ONCOLOGY | Facility: CLINIC | Age: 69
End: 2019-12-23

## 2019-12-23 VITALS
HEART RATE: 98 BPM | DIASTOLIC BLOOD PRESSURE: 95 MMHG | SYSTOLIC BLOOD PRESSURE: 137 MMHG | TEMPERATURE: 98 F | RESPIRATION RATE: 18 BRPM

## 2019-12-23 DIAGNOSIS — D64.9 ANEMIA REQUIRING TRANSFUSIONS: Primary | ICD-10-CM

## 2019-12-23 DIAGNOSIS — D64.9 ANEMIA REQUIRING TRANSFUSIONS: ICD-10-CM

## 2019-12-23 PROCEDURE — 63600175 PHARM REV CODE 636 W HCPCS: Mod: HCNC | Performed by: NURSE PRACTITIONER

## 2019-12-23 PROCEDURE — 86920 COMPATIBILITY TEST SPIN: CPT | Mod: HCNC

## 2019-12-23 PROCEDURE — P9040 RBC LEUKOREDUCED IRRADIATED: HCPCS | Mod: HCNC

## 2019-12-23 PROCEDURE — 25000003 PHARM REV CODE 250: Mod: HCNC | Performed by: NURSE PRACTITIONER

## 2019-12-23 PROCEDURE — 36430 TRANSFUSION BLD/BLD COMPNT: CPT | Mod: HCNC

## 2019-12-23 RX ORDER — HYDROCODONE BITARTRATE AND ACETAMINOPHEN 500; 5 MG/1; MG/1
TABLET ORAL ONCE
Status: COMPLETED | OUTPATIENT
Start: 2019-12-23 | End: 2019-12-23

## 2019-12-23 RX ORDER — HYDROCODONE BITARTRATE AND ACETAMINOPHEN 500; 5 MG/1; MG/1
TABLET ORAL ONCE
Status: CANCELLED | OUTPATIENT
Start: 2019-12-23 | End: 2019-12-23

## 2019-12-23 RX ORDER — DIPHENHYDRAMINE HCL 25 MG
25 CAPSULE ORAL
Status: CANCELLED | OUTPATIENT
Start: 2019-12-23

## 2019-12-23 RX ORDER — ACETAMINOPHEN 325 MG/1
650 TABLET ORAL
Status: CANCELLED | OUTPATIENT
Start: 2019-12-23

## 2019-12-23 RX ORDER — DIPHENHYDRAMINE HCL 25 MG
25 CAPSULE ORAL
Status: COMPLETED | OUTPATIENT
Start: 2019-12-23 | End: 2019-12-23

## 2019-12-23 RX ORDER — ACETAMINOPHEN 325 MG/1
650 TABLET ORAL
Status: COMPLETED | OUTPATIENT
Start: 2019-12-23 | End: 2019-12-23

## 2019-12-23 RX ADMIN — ACETAMINOPHEN 650 MG: 325 TABLET ORAL at 01:12

## 2019-12-23 RX ADMIN — SODIUM CHLORIDE: 0.9 INJECTION, SOLUTION INTRAVENOUS at 01:12

## 2019-12-23 RX ADMIN — DIPHENHYDRAMINE HYDROCHLORIDE 25 MG: 25 CAPSULE ORAL at 01:12

## 2019-12-23 NOTE — PLAN OF CARE
Patient tolerated 1 unit PRBC with no complications. VSS. Pt instructed to call MD with any problems. NAD. Pt discharged home independently.

## 2019-12-26 ENCOUNTER — TELEPHONE (OUTPATIENT)
Dept: INTERNAL MEDICINE | Facility: CLINIC | Age: 69
End: 2019-12-26

## 2019-12-26 NOTE — TELEPHONE ENCOUNTER
----- Message from Elsy Poe sent at 12/26/2019 11:44 AM CST -----  Contact: Nilton Sena [phone] Case # [id number]  Requesting call back  to verify chronic condition listed on application.    Please call and advise  Thank you

## 2019-12-30 ENCOUNTER — TELEPHONE (OUTPATIENT)
Dept: HEMATOLOGY/ONCOLOGY | Facility: CLINIC | Age: 69
End: 2019-12-30

## 2020-01-02 DIAGNOSIS — E11.9 CONTROLLED TYPE 2 DIABETES MELLITUS WITHOUT COMPLICATION, WITHOUT LONG-TERM CURRENT USE OF INSULIN: ICD-10-CM

## 2020-01-02 RX ORDER — METFORMIN HYDROCHLORIDE 500 MG/1
500 TABLET, EXTENDED RELEASE ORAL
Qty: 90 TABLET | Refills: 0 | Status: SHIPPED | OUTPATIENT
Start: 2020-01-02 | End: 2020-03-05 | Stop reason: SDUPTHER

## 2020-01-02 NOTE — TELEPHONE ENCOUNTER
Spoke to patient. Patient is scheduled for next available annual physical with PCP, 03/05 at 10 am Thurs. Pt was informed that new prescription for the metformin was sent.

## 2020-01-02 NOTE — PROGRESS NOTES
Refill Authorization Note     is requesting a refill authorization.    Brief assessment and rationale for refill: REVIEW: no longer follows pcp          Medication Therapy Plan: LOV with Dr. Rapp(12/17); No longer follows PCP, review    Medication reconciliation completed: No   Pharmacist Review Requested: Yes       Comments:   Requested Prescriptions   Pending Prescriptions Disp Refills    metFORMIN (GLUCOPHAGE-XR) 500 MG 24 hr tablet 90 tablet 0     Sig: Take 1 tablet (500 mg total) by mouth daily with breakfast.       Endocrinology:  Diabetes - Biguanides Failed - 1/2/2020  2:04 PM        Failed - Office visit in past 12 months or future 90 days     Recent Outpatient Visits            1 month ago MDS (myelodysplastic syndrome) with 5q deletion    [facility] Bernadette Ruiz NP    1 month ago MDS (myelodysplastic syndrome) with 5q deletion    [facility] Beatriz King NP    2 months ago MDS (myelodysplastic syndrome) with 5q deletion    [facility] Gita Valdes MD    3 months ago Myelodysplastic syndrome    [facility] Bernadette Ruiz NP    4 months ago Hypercholesteremia    [facility] Alina Bazan MD          Future Appointments              In 4 days LAB, HEMONC CANCER DG [facility]    In 4 days Altagracia Cornejo NP [facility]    In 4 days INJECTION, NOM INFUSION [facility], [facility]    In 5 days INJECTION, NOM INFUSION [facility], [facility]    In 6 days INJECTION, NOM INFUSION [facility]    In 1 week INJECTION, [facility] INFUSION [facility]    In 1 week INJECTION, NOM INFUSION [facility]                Failed - HBA1C is 7.9 or below and within 180 days     Hemoglobin A1C   Date Value Ref  Range Status   12/28/2017 5.3 4.0 - 5.6 % Final     Comment:     According to ADA guidelines, hemoglobin A1c <7.0% represents  optimal control in non-pregnant diabetic patients. Different  metrics may apply to specific patient populations.   Standards of Medical Care in Diabetes-2016.  For the purpose of screening for the presence of diabetes:  <5.7%     Consistent with the absence of diabetes  5.7-6.4%  Consistent with increasing risk for diabetes   (prediabetes)  >or=6.5%  Consistent with diabetes  Currently, no consensus exists for use of hemoglobin A1c  for diagnosis of diabetes for children.  This Hemoglobin A1c assay has significant interference with fetal   hemoglobin   (HbF). The results are invalid for patients with abnormal amounts of   HbF,   including those with known Hereditary Persistence   of Fetal Hemoglobin. Heterozygous hemoglobin variants (HbAS, HbAC,   HbAD, HbAE, HbA2) do not significantly interfere with this assay;   however, presence of multiple variants in a sample may impact the %   interference.     07/21/2017 4.5 4.0 - 5.6 % Final     Comment:     According to ADA guidelines, hemoglobin A1c <7.0% represents  optimal control in non-pregnant diabetic patients. Different  metrics may apply to specific patient populations.   Standards of Medical Care in Diabetes-2016.  For the purpose of screening for the presence of diabetes:  <5.7%     Consistent with the absence of diabetes  5.7-6.4%  Consistent with increasing risk for diabetes   (prediabetes)  >or=6.5%  Consistent with diabetes  Currently, no consensus exists for use of hemoglobin A1c  for diagnosis of diabetes for children.  This Hemoglobin A1c assay has significant interference with fetal   hemoglobin   (HbF). The results are invalid for patients with abnormal amounts of   HbF,   including those with known Hereditary Persistence   of Fetal Hemoglobin. Heterozygous hemoglobin variants (HbAS, HbAC,   HbAD, HbAE, HbA2) do not significantly  interfere with this assay;   however, presence of multiple variants in a sample may impact the %   interference.     06/08/2017 5.1 4.5 - 6.2 % Final     Comment:     According to ADA guidelines, hemoglobin A1C <7.0% represents  optimal control in non-pregnant diabetic patients.  Different  metrics may apply to specific populations.   Standards of Medical Care in Diabetes - 2016.  For the purpose of screening for the presence of diabetes:  <5.7%     Consistent with the absence of diabetes  5.7-6.4%  Consistent with increasing risk for diabetes   (prediabetes)  >or=6.5%  Consistent with diabetes  Currently no consensus exists for use of hemoglobin A1C  for diagnosis of diabetes for children.                Passed - Patient is at least 18 years old        Passed - Cr is 1.4 or below and within 360 days     Creatinine   Date Value Ref Range Status   11/04/2019 1.0 0.5 - 1.4 mg/dL Final   10/07/2019 1.0 0.5 - 1.4 mg/dL Final   09/09/2019 1.4 0.5 - 1.4 mg/dL Final     POC Creatinine   Date Value Ref Range Status   03/12/2019 1.1 0.5 - 1.4 mg/dL Final              Passed - eGFR is 30 or above and within 360 days     eGFR if non    Date Value Ref Range Status   11/04/2019 57.6 (A) >60 mL/min/1.73 m^2 Final     Comment:     Calculation used to obtain the estimated glomerular filtration  rate (eGFR) is the CKD-EPI equation.      10/07/2019 57.6 (A) >60 mL/min/1.73 m^2 Final     Comment:     Calculation used to obtain the estimated glomerular filtration  rate (eGFR) is the CKD-EPI equation.      09/09/2019 38.4 (A) >60 mL/min/1.73 m^2 Final     Comment:     Calculation used to obtain the estimated glomerular filtration  rate (eGFR) is the CKD-EPI equation.        eGFR if    Date Value Ref Range Status   11/04/2019 >60.0 >60 mL/min/1.73 m^2 Final   10/07/2019 >60.0 >60 mL/min/1.73 m^2 Final   09/09/2019 44.2 (A) >60 mL/min/1.73 m^2 Final

## 2020-01-03 ENCOUNTER — TELEPHONE (OUTPATIENT)
Dept: HEMATOLOGY/ONCOLOGY | Facility: CLINIC | Age: 70
End: 2020-01-03

## 2020-01-05 PROBLEM — D70.9 NEUTROPENIA: Status: ACTIVE | Noted: 2020-01-05

## 2020-01-05 PROBLEM — D72.819 LEUKOPENIA: Status: ACTIVE | Noted: 2020-01-05

## 2020-01-05 PROBLEM — M79.10 MYALGIA: Status: ACTIVE | Noted: 2020-01-05

## 2020-01-06 ENCOUNTER — TELEPHONE (OUTPATIENT)
Dept: HEMATOLOGY/ONCOLOGY | Facility: CLINIC | Age: 70
End: 2020-01-06

## 2020-01-06 ENCOUNTER — LAB VISIT (OUTPATIENT)
Dept: LAB | Facility: HOSPITAL | Age: 70
End: 2020-01-06
Attending: INTERNAL MEDICINE
Payer: MEDICARE

## 2020-01-06 ENCOUNTER — INFUSION (OUTPATIENT)
Dept: INFUSION THERAPY | Facility: HOSPITAL | Age: 70
End: 2020-01-06
Attending: INTERNAL MEDICINE
Payer: MEDICARE

## 2020-01-06 ENCOUNTER — OFFICE VISIT (OUTPATIENT)
Dept: HEMATOLOGY/ONCOLOGY | Facility: CLINIC | Age: 70
End: 2020-01-06
Payer: MEDICARE

## 2020-01-06 VITALS
OXYGEN SATURATION: 95 % | DIASTOLIC BLOOD PRESSURE: 75 MMHG | WEIGHT: 179.88 LBS | SYSTOLIC BLOOD PRESSURE: 143 MMHG | TEMPERATURE: 98 F | BODY MASS INDEX: 29.97 KG/M2 | RESPIRATION RATE: 16 BRPM | HEART RATE: 91 BPM | HEIGHT: 65 IN

## 2020-01-06 VITALS
HEART RATE: 84 BPM | RESPIRATION RATE: 18 BRPM | SYSTOLIC BLOOD PRESSURE: 139 MMHG | TEMPERATURE: 98 F | DIASTOLIC BLOOD PRESSURE: 78 MMHG

## 2020-01-06 DIAGNOSIS — D46.9 MYELODYSPLASTIC SYNDROME: Primary | ICD-10-CM

## 2020-01-06 DIAGNOSIS — K21.9 GASTROESOPHAGEAL REFLUX DISEASE WITHOUT ESOPHAGITIS: ICD-10-CM

## 2020-01-06 DIAGNOSIS — E11.22 CONTROLLED TYPE 2 DIABETES MELLITUS WITH STAGE 3 CHRONIC KIDNEY DISEASE, WITHOUT LONG-TERM CURRENT USE OF INSULIN: ICD-10-CM

## 2020-01-06 DIAGNOSIS — M79.10 MYALGIA: ICD-10-CM

## 2020-01-06 DIAGNOSIS — D70.8 OTHER NEUTROPENIA: ICD-10-CM

## 2020-01-06 DIAGNOSIS — N18.30 CONTROLLED TYPE 2 DIABETES MELLITUS WITH STAGE 3 CHRONIC KIDNEY DISEASE, WITHOUT LONG-TERM CURRENT USE OF INSULIN: ICD-10-CM

## 2020-01-06 DIAGNOSIS — I25.10 CORONARY ARTERY DISEASE, ANGINA PRESENCE UNSPECIFIED, UNSPECIFIED VESSEL OR LESION TYPE, UNSPECIFIED WHETHER NATIVE OR TRANSPLANTED HEART: ICD-10-CM

## 2020-01-06 DIAGNOSIS — D46.C MDS (MYELODYSPLASTIC SYNDROME) WITH 5Q DELETION: ICD-10-CM

## 2020-01-06 DIAGNOSIS — D64.9 ANEMIA REQUIRING TRANSFUSIONS: ICD-10-CM

## 2020-01-06 DIAGNOSIS — G47.00 INSOMNIA, UNSPECIFIED TYPE: ICD-10-CM

## 2020-01-06 DIAGNOSIS — F43.23 ADJUSTMENT DISORDER WITH MIXED ANXIETY AND DEPRESSED MOOD: ICD-10-CM

## 2020-01-06 DIAGNOSIS — D61.818 PANCYTOPENIA: ICD-10-CM

## 2020-01-06 DIAGNOSIS — I10 ESSENTIAL HYPERTENSION: ICD-10-CM

## 2020-01-06 LAB
ABO + RH BLD: NORMAL
ALBUMIN SERPL BCP-MCNC: 3.6 G/DL (ref 3.5–5.2)
ALP SERPL-CCNC: 61 U/L (ref 55–135)
ALT SERPL W/O P-5'-P-CCNC: 51 U/L (ref 10–44)
ANION GAP SERPL CALC-SCNC: 13 MMOL/L (ref 8–16)
ANISOCYTOSIS BLD QL SMEAR: ABNORMAL
AST SERPL-CCNC: 29 U/L (ref 10–40)
BASO STIPL BLD QL SMEAR: ABNORMAL
BASOPHILS # BLD AUTO: 0.02 K/UL (ref 0–0.2)
BASOPHILS NFR BLD: 0.7 % (ref 0–1.9)
BILIRUB SERPL-MCNC: 0.4 MG/DL (ref 0.1–1)
BLD GP AB SCN CELLS X3 SERPL QL: NORMAL
BLD PROD TYP BPU: NORMAL
BLOOD UNIT EXPIRATION DATE: NORMAL
BLOOD UNIT TYPE CODE: 6200
BLOOD UNIT TYPE: NORMAL
BUN SERPL-MCNC: 24 MG/DL (ref 8–23)
CALCIUM SERPL-MCNC: 9.6 MG/DL (ref 8.7–10.5)
CHLORIDE SERPL-SCNC: 105 MMOL/L (ref 95–110)
CO2 SERPL-SCNC: 25 MMOL/L (ref 23–29)
CODING SYSTEM: NORMAL
CREAT SERPL-MCNC: 1 MG/DL (ref 0.5–1.4)
DIFFERENTIAL METHOD: ABNORMAL
DISPENSE STATUS: NORMAL
EOSINOPHIL # BLD AUTO: 0.1 K/UL (ref 0–0.5)
EOSINOPHIL NFR BLD: 2.8 % (ref 0–8)
ERYTHROCYTE [DISTWIDTH] IN BLOOD BY AUTOMATED COUNT: 19.3 % (ref 11.5–14.5)
EST. GFR  (AFRICAN AMERICAN): >60 ML/MIN/1.73 M^2
EST. GFR  (NON AFRICAN AMERICAN): 57.6 ML/MIN/1.73 M^2
GLUCOSE SERPL-MCNC: 115 MG/DL (ref 70–110)
HCT VFR BLD AUTO: 19.8 % (ref 37–48.5)
HGB BLD-MCNC: 6.2 G/DL (ref 12–16)
IMM GRANULOCYTES # BLD AUTO: 0.05 K/UL (ref 0–0.04)
IMM GRANULOCYTES NFR BLD AUTO: 1.8 % (ref 0–0.5)
LYMPHOCYTES # BLD AUTO: 1.2 K/UL (ref 1–4.8)
LYMPHOCYTES NFR BLD: 40.4 % (ref 18–48)
MCH RBC QN AUTO: 29.8 PG (ref 27–31)
MCHC RBC AUTO-ENTMCNC: 31.3 G/DL (ref 32–36)
MCV RBC AUTO: 95 FL (ref 82–98)
MONOCYTES # BLD AUTO: 0.2 K/UL (ref 0.3–1)
MONOCYTES NFR BLD: 6.7 % (ref 4–15)
NEUTROPHILS # BLD AUTO: 1.4 K/UL (ref 1.8–7.7)
NEUTROPHILS NFR BLD: 47.6 % (ref 38–73)
NRBC BLD-RTO: 0 /100 WBC
NUM UNITS TRANS PACKED RBC: NORMAL
PLATELET # BLD AUTO: 163 K/UL (ref 150–350)
PLATELET BLD QL SMEAR: ABNORMAL
PMV BLD AUTO: 11.1 FL (ref 9.2–12.9)
POLYCHROMASIA BLD QL SMEAR: ABNORMAL
POTASSIUM SERPL-SCNC: 3.6 MMOL/L (ref 3.5–5.1)
PROT SERPL-MCNC: 6.6 G/DL (ref 6–8.4)
RBC # BLD AUTO: 2.08 M/UL (ref 4–5.4)
SODIUM SERPL-SCNC: 143 MMOL/L (ref 136–145)
WBC # BLD AUTO: 2.85 K/UL (ref 3.9–12.7)

## 2020-01-06 PROCEDURE — 3078F DIAST BP <80 MM HG: CPT | Mod: HCNC,CPTII,S$GLB, | Performed by: NURSE PRACTITIONER

## 2020-01-06 PROCEDURE — 25000003 PHARM REV CODE 250: Mod: HCNC | Performed by: INTERNAL MEDICINE

## 2020-01-06 PROCEDURE — 99214 OFFICE O/P EST MOD 30 MIN: CPT | Mod: HCNC,S$GLB,, | Performed by: NURSE PRACTITIONER

## 2020-01-06 PROCEDURE — 99999 PR PBB SHADOW E&M-EST. PATIENT-LVL III: ICD-10-PCS | Mod: PBBFAC,HCNC,, | Performed by: NURSE PRACTITIONER

## 2020-01-06 PROCEDURE — 86920 COMPATIBILITY TEST SPIN: CPT | Mod: HCNC

## 2020-01-06 PROCEDURE — 25000003 PHARM REV CODE 250: Mod: HCNC | Performed by: NURSE PRACTITIONER

## 2020-01-06 PROCEDURE — 1125F PR PAIN SEVERITY QUANTIFIED, PAIN PRESENT: ICD-10-PCS | Mod: HCNC,S$GLB,, | Performed by: NURSE PRACTITIONER

## 2020-01-06 PROCEDURE — P9040 RBC LEUKOREDUCED IRRADIATED: HCPCS | Mod: HCNC

## 2020-01-06 PROCEDURE — 99999 PR PBB SHADOW E&M-EST. PATIENT-LVL III: CPT | Mod: PBBFAC,HCNC,, | Performed by: NURSE PRACTITIONER

## 2020-01-06 PROCEDURE — 1159F MED LIST DOCD IN RCRD: CPT | Mod: HCNC,S$GLB,, | Performed by: NURSE PRACTITIONER

## 2020-01-06 PROCEDURE — 1101F PT FALLS ASSESS-DOCD LE1/YR: CPT | Mod: HCNC,CPTII,S$GLB, | Performed by: NURSE PRACTITIONER

## 2020-01-06 PROCEDURE — 1125F AMNT PAIN NOTED PAIN PRSNT: CPT | Mod: HCNC,S$GLB,, | Performed by: NURSE PRACTITIONER

## 2020-01-06 PROCEDURE — 3078F PR MOST RECENT DIASTOLIC BLOOD PRESSURE < 80 MM HG: ICD-10-PCS | Mod: HCNC,CPTII,S$GLB, | Performed by: NURSE PRACTITIONER

## 2020-01-06 PROCEDURE — 99499 RISK ADDL DX/OHS AUDIT: ICD-10-PCS | Mod: S$PBB,,, | Performed by: NURSE PRACTITIONER

## 2020-01-06 PROCEDURE — 86900 BLOOD TYPING SEROLOGIC ABO: CPT | Mod: HCNC

## 2020-01-06 PROCEDURE — 36415 COLL VENOUS BLD VENIPUNCTURE: CPT | Mod: HCNC

## 2020-01-06 PROCEDURE — 3077F SYST BP >= 140 MM HG: CPT | Mod: HCNC,CPTII,S$GLB, | Performed by: NURSE PRACTITIONER

## 2020-01-06 PROCEDURE — 80053 COMPREHEN METABOLIC PANEL: CPT | Mod: HCNC

## 2020-01-06 PROCEDURE — 96401 CHEMO ANTI-NEOPL SQ/IM: CPT | Mod: HCNC

## 2020-01-06 PROCEDURE — 99499 UNLISTED E&M SERVICE: CPT | Mod: S$PBB,,, | Performed by: NURSE PRACTITIONER

## 2020-01-06 PROCEDURE — 1159F PR MEDICATION LIST DOCUMENTED IN MEDICAL RECORD: ICD-10-PCS | Mod: HCNC,S$GLB,, | Performed by: NURSE PRACTITIONER

## 2020-01-06 PROCEDURE — 99214 PR OFFICE/OUTPT VISIT, EST, LEVL IV, 30-39 MIN: ICD-10-PCS | Mod: HCNC,S$GLB,, | Performed by: NURSE PRACTITIONER

## 2020-01-06 PROCEDURE — 63600175 PHARM REV CODE 636 W HCPCS: Mod: HCNC | Performed by: NURSE PRACTITIONER

## 2020-01-06 PROCEDURE — 85025 COMPLETE CBC W/AUTO DIFF WBC: CPT | Mod: HCNC

## 2020-01-06 PROCEDURE — 36430 TRANSFUSION BLD/BLD COMPNT: CPT | Mod: HCNC

## 2020-01-06 PROCEDURE — 63600175 PHARM REV CODE 636 W HCPCS: Mod: JG,HCNC | Performed by: INTERNAL MEDICINE

## 2020-01-06 PROCEDURE — 3077F PR MOST RECENT SYSTOLIC BLOOD PRESSURE >= 140 MM HG: ICD-10-PCS | Mod: HCNC,CPTII,S$GLB, | Performed by: NURSE PRACTITIONER

## 2020-01-06 PROCEDURE — 1101F PR PT FALLS ASSESS DOC 0-1 FALLS W/OUT INJ PAST YR: ICD-10-PCS | Mod: HCNC,CPTII,S$GLB, | Performed by: NURSE PRACTITIONER

## 2020-01-06 RX ORDER — AZACITIDINE 100 MG/1
75 INJECTION, POWDER, LYOPHILIZED, FOR SOLUTION INTRAVENOUS; SUBCUTANEOUS
Status: CANCELLED | OUTPATIENT
Start: 2020-01-10

## 2020-01-06 RX ORDER — ONDANSETRON 2 MG/ML
8 INJECTION INTRAMUSCULAR; INTRAVENOUS
Status: CANCELLED | OUTPATIENT
Start: 2020-01-09

## 2020-01-06 RX ORDER — ONDANSETRON 4 MG/1
8 TABLET, FILM COATED ORAL ONCE
Status: COMPLETED | OUTPATIENT
Start: 2020-01-06 | End: 2020-01-06

## 2020-01-06 RX ORDER — PANTOPRAZOLE SODIUM 40 MG/1
40 TABLET, DELAYED RELEASE ORAL DAILY
Qty: 30 TABLET | Refills: 1 | Status: SHIPPED | OUTPATIENT
Start: 2020-01-06 | End: 2020-04-03 | Stop reason: SDUPTHER

## 2020-01-06 RX ORDER — HYDROCODONE BITARTRATE AND ACETAMINOPHEN 500; 5 MG/1; MG/1
TABLET ORAL ONCE
Status: COMPLETED | OUTPATIENT
Start: 2020-01-06 | End: 2020-01-06

## 2020-01-06 RX ORDER — AZACITIDINE 100 MG/1
75 INJECTION, POWDER, LYOPHILIZED, FOR SOLUTION INTRAVENOUS; SUBCUTANEOUS
Status: CANCELLED | OUTPATIENT
Start: 2020-01-06

## 2020-01-06 RX ORDER — ONDANSETRON 2 MG/ML
8 INJECTION INTRAMUSCULAR; INTRAVENOUS
Status: CANCELLED | OUTPATIENT
Start: 2020-01-07

## 2020-01-06 RX ORDER — ACETAMINOPHEN 325 MG/1
650 TABLET ORAL
Status: COMPLETED | OUTPATIENT
Start: 2020-01-06 | End: 2020-01-06

## 2020-01-06 RX ORDER — AZACITIDINE 100 MG/1
75 INJECTION, POWDER, LYOPHILIZED, FOR SOLUTION INTRAVENOUS; SUBCUTANEOUS
Status: COMPLETED | OUTPATIENT
Start: 2020-01-06 | End: 2020-01-06

## 2020-01-06 RX ORDER — ONDANSETRON 2 MG/ML
8 INJECTION INTRAMUSCULAR; INTRAVENOUS
Status: DISCONTINUED | OUTPATIENT
Start: 2020-01-06 | End: 2020-01-06

## 2020-01-06 RX ORDER — ONDANSETRON 2 MG/ML
8 INJECTION INTRAMUSCULAR; INTRAVENOUS
Status: CANCELLED | OUTPATIENT
Start: 2020-01-06

## 2020-01-06 RX ORDER — ONDANSETRON 2 MG/ML
8 INJECTION INTRAMUSCULAR; INTRAVENOUS
Status: CANCELLED
Start: 2020-01-08

## 2020-01-06 RX ORDER — ONDANSETRON 2 MG/ML
8 INJECTION INTRAMUSCULAR; INTRAVENOUS
Status: CANCELLED | OUTPATIENT
Start: 2020-01-10

## 2020-01-06 RX ORDER — AZACITIDINE 100 MG/1
75 INJECTION, POWDER, LYOPHILIZED, FOR SOLUTION INTRAVENOUS; SUBCUTANEOUS
Status: CANCELLED | OUTPATIENT
Start: 2020-01-09

## 2020-01-06 RX ORDER — FLUCONAZOLE 200 MG/1
400 TABLET ORAL DAILY
Qty: 60 TABLET | Refills: 6 | Status: SHIPPED | OUTPATIENT
Start: 2020-01-06 | End: 2020-11-20 | Stop reason: SDUPTHER

## 2020-01-06 RX ORDER — ACETAMINOPHEN 325 MG/1
650 TABLET ORAL
Status: CANCELLED | OUTPATIENT
Start: 2020-01-06

## 2020-01-06 RX ORDER — AZACITIDINE 100 MG/1
75 INJECTION, POWDER, LYOPHILIZED, FOR SOLUTION INTRAVENOUS; SUBCUTANEOUS
Status: CANCELLED | OUTPATIENT
Start: 2020-01-08

## 2020-01-06 RX ORDER — HYDROCODONE BITARTRATE AND ACETAMINOPHEN 500; 5 MG/1; MG/1
TABLET ORAL ONCE
Status: CANCELLED | OUTPATIENT
Start: 2020-01-06 | End: 2020-01-06

## 2020-01-06 RX ORDER — AZACITIDINE 100 MG/1
75 INJECTION, POWDER, LYOPHILIZED, FOR SOLUTION INTRAVENOUS; SUBCUTANEOUS
Status: CANCELLED | OUTPATIENT
Start: 2020-01-07

## 2020-01-06 RX ADMIN — ACETAMINOPHEN 650 MG: 325 TABLET ORAL at 10:01

## 2020-01-06 RX ADMIN — ONDANSETRON HYDROCHLORIDE 8 MG: 4 TABLET, FILM COATED ORAL at 12:01

## 2020-01-06 RX ADMIN — AZACITIDINE 150 MG: 100 INJECTION, POWDER, LYOPHILIZED, FOR SOLUTION INTRAVENOUS; SUBCUTANEOUS at 01:01

## 2020-01-06 RX ADMIN — SODIUM CHLORIDE: 9 INJECTION, SOLUTION INTRAVENOUS at 09:01

## 2020-01-06 NOTE — Clinical Note
- Schedule CBC and type and screen weekly on Mondays for possible transfusions. Schedule through January.- Schedule return visit with CBC, type and screen, CMP and appt with Dr. Valdes or NP 2/3/20- Schedule chemo chair for cycle 9 vidaza 2/3/20 through 2/7/20

## 2020-01-06 NOTE — PROGRESS NOTES
Subjective:       Patient ID: Susan Puente is a 69 y.o. female.    Chief Complaint: No chief complaint on file.    HPI: Patient presents today alone for follow up of her history of MDS 5 q del syndrome prior to cycle 9 Vidaza as third line therapy for 5q minus syndrome. She continues to have disease per recent BM bx 10/2019. She c/o chronic fatigue and leg pain. Leg pain has improved with gabapentin. She also c/o insomnia. She states that she will take benadryl only after she has laid in bed awake for a couple of hours. She denies fevers, chills, sob, chest pain.     Oncology History   Ms. Puente is a 68 year old female with hx of DM2, peripheral vascular disease, tobacco use, CAD, hyperlipidemia, hypertension who was hospitalized 11/10/16 for anemia. hgb 5.3  MCH 43.4 with normal WBC and platelets. Patient had normal iron stores. She was transfused 3 units of PRBCs and discharged home with hgb 8.7 on 11/11/16. She was referred for further evalutation of her anemia. On 12/16/16 patient had a normal SPEP and immunofixation. Slightly elevated kappa light chains with normal ration. Elevated vitamin b12, normal folate, JAYDEN was positive with a low titer and negative profile. Patient had a bone marrow biopsy 1/5/16 which showed the core biopsy is normocellular for age (40%); however, megakaryocytes are increased and show frequent small, hypolobated forms. Additionally, a subset of the neutrophils are hypogranular. Blasts are not increased by either morphology (1.2%) or in the corresponding flow cytometric analysis. Fish detects a 5q deletion in 54.5% of nuclei. Cytogenetics reported 20 metaphases, 2 metaphases were normal and 18 metaphases had a 5q deletion. No additional cytogenetic abnormalities were detected. Findings consistent with 5q deletion syndrome.     Patient had a delay in obtaining Revlimid 10 mg daily but did start taking the medication 2/8/17. On 2/9/17 patient developed a diffuse  maculopapular rash throughout scalp arms, legs and torso. She states she had no stridor or wheezing. She discontinued medication 2/15/17. Went to allergist who provided references on desensitization so that patient could resume Revlimid. Unfortunately developed pancytopenia and Revlimid stopped 6/16/17. She had a repeat BMBX  performed 6/8/17 and showed a hypercellular marrow (60%) continued atypia in the granulocytes and megakaryocytes were noted.  Additionally, there is erythroid atypia present. No increase in blasts. Cytogenetics are normal and MDS FISH is negative, failing to show 5 q minus. NGS should no significant molecular mutations.  Anemia work up revealed bienvenido negative hemolysis.    Revlimid has been stopped since June 2017 after she developed pancytopenia while on Revlimid (required desensitization). CBC was normal for almost 1 year and marrow was negative for del5q. Bone marrow biopsy repeat from 6/2018 showed relapsed 5q minus MDS without new or additional mutations. Revlimid restarted at 2.5 mg daily with prednisone to prevent allergic reaction. Titrated to a goal of 10mg daily Revlimid. Hospital admission 3/12-3/17/19 for unresponsive event after blood transfusion, found to be profoundly pancytopenic at admission. Since hospital admission Revlimid has been stopped. Repeat marrow March 2019 shows persistent MDS with 5q minus.     Started cycle 1 Vidaza 5/6/19 subq for 5 days every 28 days. Restaging bone marrow biopsy from 10/24/19 shows persistent MDS, no excess blasts and 3 of 20 metaphases with 5q deletion.    Review of Systems   Constitutional: Negative for fever, chills, weight loss, fatigue.   HENT: Negative for sinus congestion or rhinorrhea. Negative for ear pain, mouth sores, nosebleeds and trouble swallowing.    Respiratory: Negative for cough, shortness of breath and wheezing.    Cardiovascular: Negative for chest pain and leg swelling.   Gastrointestinal: + constipation Negative for  abdominal distention, abdominal pain, blood in stool, diarrhea, nausea and vomiting.   Endocrine: Negative for polyphagia and polyuria.   Genitourinary: Negative for dysuria, hematuria and urgency.   Musculoskeletal: Positive for sciatic pain much improved.   Skin: Negative for color change, pallor and rash.   Neurological: Negative for dizziness, weakness, light-headedness and headaches.   Hematological: Negative for adenopathy. Does not bruise/bleed easily.   Psychiatric/Behavioral: Negative for agitation and behavioral problems.       Objective:      Physical Exam   Constitutional: She is oriented to person, place, and time. She appears well-developed and well-nourished.   Mouth/Throat: Oropharynx is clear and moist. No oropharyngeal exudate.   Eyes: Conjunctivae and EOM are normal. Right eye exhibits no discharge. Left eye exhibits no discharge.   Neck: Normal range of motion. Neck supple.   Cardiovascular: Normal rate, regular rhythm, normal heart sounds and intact distal pulses.   No murmur heard.  Pulmonary/Chest: Effort normal and breath sounds normal. No respiratory distress. She has no wheezes.   Abdominal: Soft. Bowel sounds are normal. She exhibits no distension and no mass. There is no tenderness.   Musculoskeletal: Normal range of motion. She exhibits no edema.   Neurological: She is alert and oriented to person, place, and time.   Skin: Skin is warm and dry. No rash noted.   Psychiatric: She has a normal mood and affect. Her behavior is normal. Judgment and thought content normal.   Nursing note and vitals reviewed.      Assessment:       1. Myelodysplastic syndrome    2. Anemia requiring transfusions    3. Other neutropenia    4. Controlled type 2 diabetes mellitus with stage 3 chronic kidney disease, without long-term current use of insulin    5. Essential hypertension    6. Coronary artery disease, angina presence unspecified, unspecified vessel or lesion type, unspecified whether native or  transplanted heart    7. Adjustment disorder with mixed anxiety and depressed mood    8. Myalgia    9. Gastroesophageal reflux disease without esophagitis    10. Pancytopenia    11. Insomnia, unspecified type        Plan:     MDS  - Initially with 5 q minus syndrome and treated with Revlimid. Developed allergy and was then desensitized. Soon after developed pancytopenia and Revlimid held since June 2017.    - BMBX on 6/8/17 was with normal cytogenetics. 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 Discussed treatment options of HMA therapy or repeat trial of Revlimid and patient wished to proceed with Revlimid   - Revlimid stopped again due to repeat allergic reaction and pancytopenia 3/2019  - Started cycle 1 Vidaza 5/6/19 subq for 5 days every 28 days  - Restaging bone marrow biopsy from 10/24/19 shows persistent MDS, no excess blasts and 3 of 20 metaphases with 5q deletion  - Tolerated cycles 1-7 well. Package insert for Vidaza recommends holding if ANC <1000, however pt has been receiving this with an ANC below 1000 for each cycle. Per Dr. Valdes, continue to give despite neutropenia. Will proceed with C9 today     Cytopenias 2/2 disease: anemia and leukopenia  - Transfuse for hgb < 7 or plts < 10K  - WBC 2.85; ANC improved to 1356, hgb 6.2; plt normal  - 1 unit prbc in infusion today  - Continue ppx cipro, acyclovir, and fluconazole    DM2  - management per PCP  - continue metformin    HTN  - management per PCP  - continue lisinopril-HCTZ    CAD  - continue praluent for HLD per PCP (intolerant to statins)  - continue ASA    Adjustment disorder  - Improved mood on Celexa. Continue    Myalgias/possible sciatica  - started trial of gabapentin 100 mg bid 11/4/19, much improved  - decreased tylenol previously    Insonmia  - continue benadryl and take it earlier in the evening. Do not wait until 2 am to take it.    Follow up:  - Has vidaza scheduled through 1/10  - Schedule CBC and type and screen weekly on  Mondays for possible transfusions. Schedule through January.  - Schedule return visit with CBC, type and screen, CMP and appt with Dr. Valdes or NP 2/3/20  - Schedule chemo chair for cycle 9 vidaza 2/3/20 through 2/7/20    Altagracia Cornejo, ANDRYP  Hematology/Oncology/Bone Marrow Transplant

## 2020-01-07 ENCOUNTER — INFUSION (OUTPATIENT)
Dept: INFUSION THERAPY | Facility: HOSPITAL | Age: 70
End: 2020-01-07
Attending: INTERNAL MEDICINE
Payer: MEDICARE

## 2020-01-07 VITALS — HEART RATE: 92 BPM | DIASTOLIC BLOOD PRESSURE: 65 MMHG | SYSTOLIC BLOOD PRESSURE: 151 MMHG

## 2020-01-07 DIAGNOSIS — D46.C MDS (MYELODYSPLASTIC SYNDROME) WITH 5Q DELETION: Primary | ICD-10-CM

## 2020-01-07 PROCEDURE — 63600175 PHARM REV CODE 636 W HCPCS: Mod: JG,HCNC | Performed by: INTERNAL MEDICINE

## 2020-01-07 PROCEDURE — 96401 CHEMO ANTI-NEOPL SQ/IM: CPT | Mod: HCNC

## 2020-01-07 RX ORDER — AZACITIDINE 100 MG/1
75 INJECTION, POWDER, LYOPHILIZED, FOR SOLUTION INTRAVENOUS; SUBCUTANEOUS
Status: COMPLETED | OUTPATIENT
Start: 2020-01-07 | End: 2020-01-07

## 2020-01-07 RX ADMIN — AZACITIDINE 150 MG: 100 INJECTION, POWDER, LYOPHILIZED, FOR SOLUTION INTRAVENOUS; SUBCUTANEOUS at 03:01

## 2020-01-08 ENCOUNTER — INFUSION (OUTPATIENT)
Dept: INFUSION THERAPY | Facility: HOSPITAL | Age: 70
End: 2020-01-08
Attending: INTERNAL MEDICINE
Payer: MEDICARE

## 2020-01-08 VITALS — DIASTOLIC BLOOD PRESSURE: 65 MMHG | SYSTOLIC BLOOD PRESSURE: 141 MMHG | TEMPERATURE: 98 F | HEART RATE: 91 BPM

## 2020-01-08 DIAGNOSIS — D46.C MDS (MYELODYSPLASTIC SYNDROME) WITH 5Q DELETION: Primary | ICD-10-CM

## 2020-01-08 PROCEDURE — 96401 CHEMO ANTI-NEOPL SQ/IM: CPT | Mod: HCNC

## 2020-01-08 PROCEDURE — 63600175 PHARM REV CODE 636 W HCPCS: Mod: JG,HCNC | Performed by: INTERNAL MEDICINE

## 2020-01-08 RX ORDER — AZACITIDINE 100 MG/1
75 INJECTION, POWDER, LYOPHILIZED, FOR SOLUTION INTRAVENOUS; SUBCUTANEOUS
Status: COMPLETED | OUTPATIENT
Start: 2020-01-08 | End: 2020-01-08

## 2020-01-08 RX ADMIN — AZACITIDINE 150 MG: 100 INJECTION, POWDER, LYOPHILIZED, FOR SOLUTION INTRAVENOUS; SUBCUTANEOUS at 02:01

## 2020-01-09 ENCOUNTER — TELEPHONE (OUTPATIENT)
Dept: INTERNAL MEDICINE | Facility: CLINIC | Age: 70
End: 2020-01-09

## 2020-01-09 ENCOUNTER — INFUSION (OUTPATIENT)
Dept: INFUSION THERAPY | Facility: HOSPITAL | Age: 70
End: 2020-01-09
Attending: INTERNAL MEDICINE
Payer: MEDICARE

## 2020-01-09 DIAGNOSIS — D46.C MDS (MYELODYSPLASTIC SYNDROME) WITH 5Q DELETION: Primary | ICD-10-CM

## 2020-01-09 PROCEDURE — 96401 CHEMO ANTI-NEOPL SQ/IM: CPT | Mod: HCNC

## 2020-01-09 PROCEDURE — 63600175 PHARM REV CODE 636 W HCPCS: Mod: JG,HCNC | Performed by: INTERNAL MEDICINE

## 2020-01-09 RX ORDER — AZACITIDINE 100 MG/1
75 INJECTION, POWDER, LYOPHILIZED, FOR SOLUTION INTRAVENOUS; SUBCUTANEOUS
Status: COMPLETED | OUTPATIENT
Start: 2020-01-09 | End: 2020-01-09

## 2020-01-09 RX ORDER — ONDANSETRON 2 MG/ML
8 INJECTION INTRAMUSCULAR; INTRAVENOUS
Status: DISCONTINUED | OUTPATIENT
Start: 2020-01-09 | End: 2020-01-09 | Stop reason: HOSPADM

## 2020-01-09 RX ADMIN — AZACITIDINE 150 MG: 100 INJECTION, POWDER, LYOPHILIZED, FOR SOLUTION INTRAVENOUS; SUBCUTANEOUS at 02:01

## 2020-01-09 NOTE — TELEPHONE ENCOUNTER
Spoke with Padmini from [facility]. She was given verbal for patient's chronic conditions.thanks.

## 2020-01-09 NOTE — TELEPHONE ENCOUNTER
----- Message from Glynn Carbone sent at 1/9/2020 12:45 PM CST -----  Contact: [facility] [phone] Case Number [id number]  Patient is returning a phone call.  Who left a message for the patient: Dr florence  Does patient know what this is regarding:    Comments: returning call back to get verbal on Patient chronic condition, stating is urgent, is needing information by the end of January.  [facility] [phone] Case Number [id number]    Please call an advise  Thank you

## 2020-01-09 NOTE — TELEPHONE ENCOUNTER
Bilateral carotid artery disease         CAD (coronary artery disease)         Calcification of aorta                  Essential hypertension          Controlled type 2 diabetes mellitus with stage 3 chronic kidney disease, without long-term current use of insulin

## 2020-01-09 NOTE — TELEPHONE ENCOUNTER
Spoke with Pavithra with [facility]. She is stating that she would like to get verbal on patient's chronic conditions: only for cardiovascular and diabetes.    Please advise.

## 2020-01-10 ENCOUNTER — INFUSION (OUTPATIENT)
Dept: INFUSION THERAPY | Facility: HOSPITAL | Age: 70
End: 2020-01-10
Attending: INTERNAL MEDICINE
Payer: MEDICARE

## 2020-01-10 VITALS
DIASTOLIC BLOOD PRESSURE: 48 MMHG | RESPIRATION RATE: 18 BRPM | HEART RATE: 109 BPM | TEMPERATURE: 98 F | SYSTOLIC BLOOD PRESSURE: 102 MMHG

## 2020-01-10 DIAGNOSIS — D46.C MDS (MYELODYSPLASTIC SYNDROME) WITH 5Q DELETION: Primary | ICD-10-CM

## 2020-01-10 PROCEDURE — 96401 CHEMO ANTI-NEOPL SQ/IM: CPT | Mod: HCNC

## 2020-01-10 PROCEDURE — 63600175 PHARM REV CODE 636 W HCPCS: Mod: JG,HCNC | Performed by: INTERNAL MEDICINE

## 2020-01-10 RX ORDER — AZACITIDINE 100 MG/1
75 INJECTION, POWDER, LYOPHILIZED, FOR SOLUTION INTRAVENOUS; SUBCUTANEOUS
Status: COMPLETED | OUTPATIENT
Start: 2020-01-10 | End: 2020-01-10

## 2020-01-10 RX ADMIN — AZACITIDINE 150 MG: 100 INJECTION, POWDER, LYOPHILIZED, FOR SOLUTION SUBCUTANEOUS at 02:01

## 2020-01-10 NOTE — NURSING
Patient received Vidaza injection SQ to ABD x 3.  Tolerated well.  Patient reports n/v prior to appt. After eating large meal.  Reports feeling better after vomiting.  NADN.  VSS.  Patient instructed to call MD if N/V continue or become worse.  Verbalized understanding.  Ambulated off unit with walker.

## 2020-01-13 ENCOUNTER — LAB VISIT (OUTPATIENT)
Dept: LAB | Facility: HOSPITAL | Age: 70
End: 2020-01-13
Payer: MEDICARE

## 2020-01-13 DIAGNOSIS — D46.C MDS (MYELODYSPLASTIC SYNDROME) WITH 5Q DELETION: ICD-10-CM

## 2020-01-13 LAB
ABO + RH BLD: NORMAL
BASOPHILS # BLD AUTO: 0.03 K/UL (ref 0–0.2)
BASOPHILS NFR BLD: 0.9 % (ref 0–1.9)
BLD GP AB SCN CELLS X3 SERPL QL: NORMAL
DIFFERENTIAL METHOD: ABNORMAL
EOSINOPHIL # BLD AUTO: 0.1 K/UL (ref 0–0.5)
EOSINOPHIL NFR BLD: 4.1 % (ref 0–8)
ERYTHROCYTE [DISTWIDTH] IN BLOOD BY AUTOMATED COUNT: 19.3 % (ref 11.5–14.5)
HCT VFR BLD AUTO: 23.3 % (ref 37–48.5)
HGB BLD-MCNC: 7.3 G/DL (ref 12–16)
IMM GRANULOCYTES # BLD AUTO: 0.05 K/UL (ref 0–0.04)
IMM GRANULOCYTES NFR BLD AUTO: 1.5 % (ref 0–0.5)
LYMPHOCYTES # BLD AUTO: 1.2 K/UL (ref 1–4.8)
LYMPHOCYTES NFR BLD: 34.6 % (ref 18–48)
MCH RBC QN AUTO: 29.7 PG (ref 27–31)
MCHC RBC AUTO-ENTMCNC: 31.3 G/DL (ref 32–36)
MCV RBC AUTO: 95 FL (ref 82–98)
MONOCYTES # BLD AUTO: 0.1 K/UL (ref 0.3–1)
MONOCYTES NFR BLD: 3.5 % (ref 4–15)
NEUTROPHILS # BLD AUTO: 1.9 K/UL (ref 1.8–7.7)
NEUTROPHILS NFR BLD: 55.4 % (ref 38–73)
NRBC BLD-RTO: 0 /100 WBC
PLATELET # BLD AUTO: 197 K/UL (ref 150–350)
PMV BLD AUTO: 11.6 FL (ref 9.2–12.9)
RBC # BLD AUTO: 2.46 M/UL (ref 4–5.4)
WBC # BLD AUTO: 3.41 K/UL (ref 3.9–12.7)

## 2020-01-13 PROCEDURE — 86850 RBC ANTIBODY SCREEN: CPT | Mod: HCNC

## 2020-01-13 PROCEDURE — 85025 COMPLETE CBC W/AUTO DIFF WBC: CPT | Mod: HCNC

## 2020-01-13 PROCEDURE — 36415 COLL VENOUS BLD VENIPUNCTURE: CPT | Mod: HCNC

## 2020-01-16 ENCOUNTER — PATIENT MESSAGE (OUTPATIENT)
Dept: CARDIOLOGY | Facility: CLINIC | Age: 70
End: 2020-01-16

## 2020-01-20 ENCOUNTER — LAB VISIT (OUTPATIENT)
Dept: LAB | Facility: HOSPITAL | Age: 70
End: 2020-01-20
Payer: MEDICARE

## 2020-01-20 ENCOUNTER — PES CALL (OUTPATIENT)
Dept: ADMINISTRATIVE | Facility: CLINIC | Age: 70
End: 2020-01-20

## 2020-01-20 DIAGNOSIS — D46.C MDS (MYELODYSPLASTIC SYNDROME) WITH 5Q DELETION: ICD-10-CM

## 2020-01-20 LAB
ABO + RH BLD: NORMAL
ANISOCYTOSIS BLD QL SMEAR: ABNORMAL
BASOPHILS # BLD AUTO: 0.03 K/UL (ref 0–0.2)
BASOPHILS NFR BLD: 1 % (ref 0–1.9)
BLD GP AB SCN CELLS X3 SERPL QL: NORMAL
DIFFERENTIAL METHOD: ABNORMAL
EOSINOPHIL # BLD AUTO: 0.1 K/UL (ref 0–0.5)
EOSINOPHIL NFR BLD: 4.7 % (ref 0–8)
ERYTHROCYTE [DISTWIDTH] IN BLOOD BY AUTOMATED COUNT: 19 % (ref 11.5–14.5)
HCT VFR BLD AUTO: 20.3 % (ref 37–48.5)
HGB BLD-MCNC: 6.5 G/DL (ref 12–16)
HYPOCHROMIA BLD QL SMEAR: ABNORMAL
IMM GRANULOCYTES # BLD AUTO: 0.05 K/UL (ref 0–0.04)
IMM GRANULOCYTES NFR BLD AUTO: 1.7 % (ref 0–0.5)
LYMPHOCYTES # BLD AUTO: 1.2 K/UL (ref 1–4.8)
LYMPHOCYTES NFR BLD: 39.3 % (ref 18–48)
MCH RBC QN AUTO: 30.7 PG (ref 27–31)
MCHC RBC AUTO-ENTMCNC: 32 G/DL (ref 32–36)
MCV RBC AUTO: 96 FL (ref 82–98)
MONOCYTES # BLD AUTO: 0.1 K/UL (ref 0.3–1)
MONOCYTES NFR BLD: 1.7 % (ref 4–15)
NEUTROPHILS # BLD AUTO: 1.5 K/UL (ref 1.8–7.7)
NEUTROPHILS NFR BLD: 51.6 % (ref 38–73)
NRBC BLD-RTO: 0 /100 WBC
OVALOCYTES BLD QL SMEAR: ABNORMAL
PLATELET # BLD AUTO: 113 K/UL (ref 150–350)
PLATELET BLD QL SMEAR: ABNORMAL
PMV BLD AUTO: 11.6 FL (ref 9.2–12.9)
POIKILOCYTOSIS BLD QL SMEAR: SLIGHT
POLYCHROMASIA BLD QL SMEAR: ABNORMAL
RBC # BLD AUTO: 2.12 M/UL (ref 4–5.4)
WBC # BLD AUTO: 2.95 K/UL (ref 3.9–12.7)

## 2020-01-20 PROCEDURE — 85025 COMPLETE CBC W/AUTO DIFF WBC: CPT | Mod: HCNC

## 2020-01-20 PROCEDURE — 86850 RBC ANTIBODY SCREEN: CPT | Mod: HCNC

## 2020-01-23 ENCOUNTER — TELEPHONE (OUTPATIENT)
Dept: PHARMACY | Facility: CLINIC | Age: 70
End: 2020-01-23

## 2020-01-23 NOTE — TELEPHONE ENCOUNTER
Patient called in to start Renewal application for Praluent. Explained to patient that Praluent is no longer preffered drug for [employer]. Let her know that we will message the doctor for a Repatha prescription instead. Verified all information from PASS Application to make sure nothing has changed. Let her also know that there is a [employer] that is open at this time that we can obtain for her once prescription is sent. Patient gave consent to start application.

## 2020-01-27 ENCOUNTER — INFUSION (OUTPATIENT)
Dept: INFUSION THERAPY | Facility: HOSPITAL | Age: 70
End: 2020-01-27
Attending: INTERNAL MEDICINE
Payer: MEDICARE

## 2020-01-27 ENCOUNTER — TELEPHONE (OUTPATIENT)
Dept: HEMATOLOGY/ONCOLOGY | Facility: CLINIC | Age: 70
End: 2020-01-27

## 2020-01-27 VITALS
TEMPERATURE: 98 F | RESPIRATION RATE: 18 BRPM | SYSTOLIC BLOOD PRESSURE: 136 MMHG | HEART RATE: 76 BPM | DIASTOLIC BLOOD PRESSURE: 63 MMHG

## 2020-01-27 DIAGNOSIS — D46.9 MYELODYSPLASTIC SYNDROME: ICD-10-CM

## 2020-01-27 DIAGNOSIS — D46.C MDS (MYELODYSPLASTIC SYNDROME) WITH 5Q DELETION: ICD-10-CM

## 2020-01-27 DIAGNOSIS — D64.9 ANEMIA REQUIRING TRANSFUSIONS: ICD-10-CM

## 2020-01-27 DIAGNOSIS — D46.9 MYELODYSPLASTIC SYNDROME: Primary | ICD-10-CM

## 2020-01-27 DIAGNOSIS — D46.C MDS (MYELODYSPLASTIC SYNDROME) WITH 5Q DELETION: Primary | ICD-10-CM

## 2020-01-27 PROCEDURE — 63600175 PHARM REV CODE 636 W HCPCS: Mod: HCNC | Performed by: NURSE PRACTITIONER

## 2020-01-27 PROCEDURE — P9040 RBC LEUKOREDUCED IRRADIATED: HCPCS | Mod: HCNC

## 2020-01-27 PROCEDURE — 25000003 PHARM REV CODE 250: Mod: HCNC | Performed by: NURSE PRACTITIONER

## 2020-01-27 PROCEDURE — 36430 TRANSFUSION BLD/BLD COMPNT: CPT | Mod: HCNC

## 2020-01-27 PROCEDURE — 86920 COMPATIBILITY TEST SPIN: CPT | Mod: HCNC

## 2020-01-27 RX ORDER — DIPHENHYDRAMINE HCL 25 MG
25 CAPSULE ORAL
Status: CANCELLED | OUTPATIENT
Start: 2020-01-27

## 2020-01-27 RX ORDER — HYDROCODONE BITARTRATE AND ACETAMINOPHEN 500; 5 MG/1; MG/1
TABLET ORAL ONCE
Status: CANCELLED | OUTPATIENT
Start: 2020-01-27 | End: 2020-01-27

## 2020-01-27 RX ORDER — ACETAMINOPHEN 325 MG/1
650 TABLET ORAL
Status: CANCELLED | OUTPATIENT
Start: 2020-01-27

## 2020-01-27 RX ORDER — HYDROCODONE BITARTRATE AND ACETAMINOPHEN 500; 5 MG/1; MG/1
TABLET ORAL ONCE
Status: COMPLETED | OUTPATIENT
Start: 2020-01-27 | End: 2020-01-27

## 2020-01-27 RX ORDER — ACETAMINOPHEN 325 MG/1
650 TABLET ORAL
Status: COMPLETED | OUTPATIENT
Start: 2020-01-27 | End: 2020-01-27

## 2020-01-27 RX ORDER — DIPHENHYDRAMINE HCL 25 MG
25 CAPSULE ORAL
Status: COMPLETED | OUTPATIENT
Start: 2020-01-27 | End: 2020-01-27

## 2020-01-27 RX ADMIN — ACETAMINOPHEN 650 MG: 325 TABLET ORAL at 01:01

## 2020-01-27 RX ADMIN — SODIUM CHLORIDE: 0.9 INJECTION, SOLUTION INTRAVENOUS at 01:01

## 2020-01-27 RX ADMIN — DIPHENHYDRAMINE HYDROCHLORIDE 25 MG: 25 CAPSULE ORAL at 01:01

## 2020-01-27 NOTE — PLAN OF CARE
Patient tolerated 2 units of PRBCs without complications. VS stable throughout infusions. Labs reviewed prior to treatment. Consent obtained at appointment. PIV removed with catheter tip intact. AVS provided. Discharged home.

## 2020-01-27 NOTE — PROGRESS NOTES
Ordered 2 units prbc to be transfused in the infusion center for hgb of 5.4.    Altagracia Cornejo, ANDRYP  Hematology/Oncology/Bone Marrow Transplant

## 2020-01-31 ENCOUNTER — OFFICE VISIT (OUTPATIENT)
Dept: INTERNAL MEDICINE | Facility: CLINIC | Age: 70
End: 2020-01-31
Payer: MEDICARE

## 2020-01-31 ENCOUNTER — IMMUNIZATION (OUTPATIENT)
Dept: PHARMACY | Facility: CLINIC | Age: 70
End: 2020-01-31
Payer: MEDICARE

## 2020-01-31 VITALS
BODY MASS INDEX: 29.53 KG/M2 | HEART RATE: 62 BPM | HEIGHT: 65 IN | DIASTOLIC BLOOD PRESSURE: 78 MMHG | WEIGHT: 177.25 LBS | SYSTOLIC BLOOD PRESSURE: 128 MMHG

## 2020-01-31 DIAGNOSIS — Z99.89 DEPENDENCE ON OTHER ENABLING MACHINES AND DEVICES: ICD-10-CM

## 2020-01-31 DIAGNOSIS — N18.30 STAGE 3 CHRONIC KIDNEY DISEASE: ICD-10-CM

## 2020-01-31 DIAGNOSIS — D46.9 MDS (MYELODYSPLASTIC SYNDROME): ICD-10-CM

## 2020-01-31 DIAGNOSIS — N18.30 CONTROLLED TYPE 2 DIABETES MELLITUS WITH STAGE 3 CHRONIC KIDNEY DISEASE, WITHOUT LONG-TERM CURRENT USE OF INSULIN: ICD-10-CM

## 2020-01-31 DIAGNOSIS — D70.8 OTHER NEUTROPENIA: ICD-10-CM

## 2020-01-31 DIAGNOSIS — R26.9 ABNORMALITY OF GAIT AND MOBILITY: ICD-10-CM

## 2020-01-31 DIAGNOSIS — Z12.11 COLON CANCER SCREENING: ICD-10-CM

## 2020-01-31 DIAGNOSIS — I65.23 BILATERAL CAROTID ARTERY STENOSIS: ICD-10-CM

## 2020-01-31 DIAGNOSIS — I70.0 CALCIFICATION OF AORTA: ICD-10-CM

## 2020-01-31 DIAGNOSIS — I77.9 BILATERAL CAROTID ARTERY DISEASE, UNSPECIFIED TYPE: ICD-10-CM

## 2020-01-31 DIAGNOSIS — D61.818 PANCYTOPENIA: ICD-10-CM

## 2020-01-31 DIAGNOSIS — E11.22 CONTROLLED TYPE 2 DIABETES MELLITUS WITH STAGE 3 CHRONIC KIDNEY DISEASE, WITHOUT LONG-TERM CURRENT USE OF INSULIN: ICD-10-CM

## 2020-01-31 DIAGNOSIS — Z01.00 ROUTINE EYE EXAM: ICD-10-CM

## 2020-01-31 DIAGNOSIS — Z12.31 OTHER SCREENING MAMMOGRAM: ICD-10-CM

## 2020-01-31 DIAGNOSIS — D46.C MDS (MYELODYSPLASTIC SYNDROME) WITH 5Q DELETION: ICD-10-CM

## 2020-01-31 DIAGNOSIS — Z00.00 ENCOUNTER FOR PREVENTIVE HEALTH EXAMINATION: Primary | ICD-10-CM

## 2020-01-31 DIAGNOSIS — D46.9 MYELODYSPLASTIC SYNDROME: ICD-10-CM

## 2020-01-31 PROCEDURE — 99999 PR PBB SHADOW E&M-EST. PATIENT-LVL V: CPT | Mod: PBBFAC,HCNC,, | Performed by: NURSE PRACTITIONER

## 2020-01-31 PROCEDURE — 3074F SYST BP LT 130 MM HG: CPT | Mod: HCNC,CPTII,S$GLB, | Performed by: NURSE PRACTITIONER

## 2020-01-31 PROCEDURE — 99999 PR PBB SHADOW E&M-EST. PATIENT-LVL V: ICD-10-PCS | Mod: PBBFAC,HCNC,, | Performed by: NURSE PRACTITIONER

## 2020-01-31 PROCEDURE — 3078F DIAST BP <80 MM HG: CPT | Mod: HCNC,CPTII,S$GLB, | Performed by: NURSE PRACTITIONER

## 2020-01-31 PROCEDURE — G0439 PPPS, SUBSEQ VISIT: HCPCS | Mod: HCNC,S$GLB,, | Performed by: NURSE PRACTITIONER

## 2020-01-31 PROCEDURE — G0439 PR MEDICARE ANNUAL WELLNESS SUBSEQUENT VISIT: ICD-10-PCS | Mod: HCNC,S$GLB,, | Performed by: NURSE PRACTITIONER

## 2020-01-31 PROCEDURE — 99499 RISK ADDL DX/OHS AUDIT: ICD-10-PCS | Mod: S$PBB,HCNC,, | Performed by: NURSE PRACTITIONER

## 2020-01-31 PROCEDURE — 3074F PR MOST RECENT SYSTOLIC BLOOD PRESSURE < 130 MM HG: ICD-10-PCS | Mod: HCNC,CPTII,S$GLB, | Performed by: NURSE PRACTITIONER

## 2020-01-31 PROCEDURE — 3078F PR MOST RECENT DIASTOLIC BLOOD PRESSURE < 80 MM HG: ICD-10-PCS | Mod: HCNC,CPTII,S$GLB, | Performed by: NURSE PRACTITIONER

## 2020-01-31 PROCEDURE — 99499 UNLISTED E&M SERVICE: CPT | Mod: S$PBB,HCNC,, | Performed by: NURSE PRACTITIONER

## 2020-01-31 NOTE — PROGRESS NOTES
"Susan Puente presented for a  Medicare AWV and comprehensive Health Risk Assessment today. The following components were reviewed and updated:    · Medical history  · Family History  · Social history  · Allergies and Current Medications  · Health Risk Assessment  · Health Maintenance  · Care Team     ** See Completed Assessments for Annual Wellness Visit within the encounter summary.**       The following assessments were completed:  · Living Situation  · CAGE  · Depression Screening  · Timed Get Up and Go  · Whisper Test  Cognitive Function Screening    ·   · Nutrition Screening  · ADL Screening  · PAQ Screening    Vitals:    01/31/20 1319   BP: 128/78   Pulse: 62   Weight: 80.4 kg (177 lb 4 oz)   Height: 5' 5" (1.651 m)     Body mass index is 29.5 kg/m².  Physical Exam   Constitutional: She is oriented to person, place, and time. She appears well-developed and well-nourished.   HENT:   Head: Normocephalic and atraumatic.   Nose: Nose normal.   Eyes: Conjunctivae and EOM are normal.   Cardiovascular: Normal rate and intact distal pulses.   No murmur heard.  Pulmonary/Chest: Effort normal and breath sounds normal. No respiratory distress.   Musculoskeletal: Normal range of motion.   Neurological: She is alert and oriented to person, place, and time.   Skin: Skin is warm and dry.   Psychiatric: She has a normal mood and affect. Her behavior is normal. Judgment and thought content normal.   Nursing note and vitals reviewed.        Diagnoses and health risks identified today and associated recommendations/orders:    1. Encounter for preventive health examination  Assessment performed. Health maintenance updated. Chart review completed.  Referral to optometry for routine eye exam.  A1c ordered.  Discussed tetanus, shingles vaccine.  Colonoscopy ordered.  Flu shot today.  Referred to case management to assist with additional resources.    2. Bilateral carotid artery disease, unspecified type  Chronic. Continue current " regimen. Followed by Cardiology.    3. Calcification of aorta  Chronic. Continue current regimen. Followed by Cardiology.    4. Bilateral carotid artery stenosis  Chronic. Continue current regimen. Followed by Cardiology.    5. Stage 3 chronic kidney disease  Chronic. Stable. Followed by PCP.  Component      Latest Ref Rng & Units 1/6/2020             Creatinine      0.5 - 1.4 mg/dL 1.0     6. MDS (myelodysplastic syndrome)  Chronic. Continue on current regimen. Followed by Bone Marrow Transplant and Hematology/Oncolocy.    7. MDS (myelodysplastic syndrome) with 5q deletion  Chronic. Continue on current regimen. Followed by Bone Marrow Transplant and Hematology/Oncolocy.    8. Myelodysplastic syndrome  Chronic. Continue on current regimen. Followed by Bone Marrow Transplant and Hematology/Oncolocy.    9. Pancytopenia  Chronic. Continue on current regimen. Followed by Bone Marrow Transplant and Hematology/Oncolocy.    10. Controlled type 2 diabetes mellitus with stage 3 chronic kidney disease, without long-term current use of insulin  Chronic. Stable. Followed by PCP.  Component      Latest Ref Rng & Units 12/28/2017   Hemoglobin A1C External      4.0 - 5.6 % 5.3   Estimated Avg Glucose      68 - 131 mg/dL 105     11. Routine eye exam  - Ambulatory Referral to Optometry    12. Dependence on other enabling machines and devices  Stable with cane use. No recent falls.    13. Other screening mammogram  - Mammo Digital Screening Bilateral With CAD; Future    14. Abnormality of gait and mobility  Stable with cane use. No recent falls.  15. Other neutropenia  Chronic. Continue on current regimen. Followed by Bone Marrow Transplant and Hematology/Oncolocy.      Provided Susan with a 5-10 year written screening schedule and personal prevention plan. Recommendations were developed using the USPSTF age appropriate recommendations. Education, counseling, and referrals were provided as needed. After Visit Summary printed and  given to patient which includes a list of additional screenings\tests needed.    Follow up for Annual Wellness Visit in 1 year, F/U with PCP as instructed, ;sooner if problems.    KATIE Merino     I offered to discuss end of life issues, including information on how to make advance directives that the patient could use to name someone who would make medical decisions on their behalf if they became too ill to make themselves.    ___Patient declined  _X_Patient is interested, on file

## 2020-01-31 NOTE — PROGRESS NOTES
Subjective:       Patient ID: Susan Puente is a 69 y.o. female.    Chief Complaint: Cancer    HPI: Patient presents today alone for follow up of her history of MDS 5 q del syndrome prior to cycle 10 Vidaza as third line therapy for 5q minus syndrome. She continues to have disease per recent BM bx 10/2019. Continues with chronic fatigue (although improving since starting to exercise 3x a week) and leg pain. Leg pain has improved with gabapentin. Continues with insomnia, taking melatonin OTC. Notes she is finally having hair loss. Having diarrhea, reports 5-6 episodes a day. She denies fevers, chills, sob, chest pain, n/v/c.     Oncology History   Ms. Puente is a 68 year old female with hx of DM2, peripheral vascular disease, tobacco use, CAD, hyperlipidemia, hypertension who was hospitalized 11/10/16 for anemia. hgb 5.3  MCH 43.4 with normal WBC and platelets. Patient had normal iron stores. She was transfused 3 units of PRBCs and discharged home with hgb 8.7 on 11/11/16. She was referred for further evalutation of her anemia. On 12/16/16 patient had a normal SPEP and immunofixation. Slightly elevated kappa light chains with normal ration. Elevated vitamin b12, normal folate, JAYDEN was positive with a low titer and negative profile. Patient had a bone marrow biopsy 1/5/16 which showed the core biopsy is normocellular for age (40%); however, megakaryocytes are increased and show frequent small, hypolobated forms. Additionally, a subset of the neutrophils are hypogranular. Blasts are not increased by either morphology (1.2%) or in the corresponding flow cytometric analysis. Fish detects a 5q deletion in 54.5% of nuclei. Cytogenetics reported 20 metaphases, 2 metaphases were normal and 18 metaphases had a 5q deletion. No additional cytogenetic abnormalities were detected. Findings consistent with 5q deletion syndrome.     Patient had a delay in obtaining Revlimid 10 mg daily but did start taking the  medication 2/8/17. On 2/9/17 patient developed a diffuse maculopapular rash throughout scalp arms, legs and torso. She states she had no stridor or wheezing. She discontinued medication 2/15/17. Went to allergist who provided references on desensitization so that patient could resume Revlimid. Unfortunately developed pancytopenia and Revlimid stopped 6/16/17. She had a repeat BMBX  performed 6/8/17 and showed a hypercellular marrow (60%) continued atypia in the granulocytes and megakaryocytes were noted.  Additionally, there is erythroid atypia present. No increase in blasts. Cytogenetics are normal and MDS FISH is negative, failing to show 5 q minus. NGS should no significant molecular mutations.  Anemia work up revealed bienvenido negative hemolysis.    Revlimid has been stopped since June 2017 after she developed pancytopenia while on Revlimid (required desensitization). CBC was normal for almost 1 year and marrow was negative for del5q. Bone marrow biopsy repeat from 6/2018 showed relapsed 5q minus MDS without new or additional mutations. Revlimid restarted at 2.5 mg daily with prednisone to prevent allergic reaction. Titrated to a goal of 10mg daily Revlimid. Hospital admission 3/12-3/17/19 for unresponsive event after blood transfusion, found to be profoundly pancytopenic at admission. Since hospital admission Revlimid has been stopped. Repeat marrow March 2019 shows persistent MDS with 5q minus.     Started cycle 1 Vidaza 5/6/19 subq for 5 days every 28 days. Restaging bone marrow biopsy from 10/24/19 shows persistent MDS, no excess blasts and 3 of 20 metaphases with 5q deletion.    Review of Systems   Constitutional: Negative for fever, chills, weight loss, fatigue.   HENT: Negative for sinus congestion or rhinorrhea. Negative for ear pain, mouth sores, nosebleeds and trouble swallowing.    Respiratory: Negative for cough, shortness of breath and wheezing.    Cardiovascular: Negative for chest pain and leg  swelling.   Gastrointestinal: +diarrhea; Negative for abdominal distention, abdominal pain, blood in stool, nausea and vomiting.   Endocrine: Negative for polyphagia and polyuria.   Genitourinary: Negative for dysuria, hematuria and urgency.   Musculoskeletal: Positive for sciatic pain much improved.   Skin: Negative for color change, pallor and rash.   Neurological: Negative for dizziness, weakness, light-headedness and headaches.   Hematological: Negative for adenopathy. Does not bruise/bleed easily.   Psychiatric/Behavioral: Negative for agitation and behavioral problems.       Objective:      Physical Exam   Constitutional: She is oriented to person, place, and time. She appears well-developed and well-nourished.   Mouth/Throat: Oropharynx is clear and moist. No oropharyngeal exudate.   Eyes: Conjunctivae and EOM are normal. Right eye exhibits no discharge. Left eye exhibits no discharge.   Neck: Normal range of motion. Neck supple.   Cardiovascular: Normal rate, regular rhythm, normal heart sounds and intact distal pulses.   No murmur heard.  Pulmonary/Chest: Effort normal and breath sounds normal. No respiratory distress. She has no wheezes.   Abdominal: Soft. Bowel sounds are normal. She exhibits no distension and no mass. There is no tenderness.   Musculoskeletal: Normal range of motion. She exhibits no edema.   Neurological: She is alert and oriented to person, place, and time.   Skin: Skin is warm and dry. No rash noted.   Psychiatric: She has a normal mood and affect. Her behavior is normal. Judgment and thought content normal.   Nursing note and vitals reviewed.      Assessment:       1. MDS (myelodysplastic syndrome) with 5q deletion    2. Other neutropenia    3. Anemia requiring transfusions    4. Controlled type 2 diabetes mellitus with stage 3 chronic kidney disease, without long-term current use of insulin    5. Essential hypertension    6. Coronary artery disease, angina presence unspecified,  unspecified vessel or lesion type, unspecified whether native or transplanted heart    7. Adjustment disorder with mixed anxiety and depressed mood    8. Myalgia    9. Insomnia, unspecified type    10. Bilateral sciatica    11. Hypokalemia    12. NOEL (acute kidney injury)    13. Chemotherapy induced diarrhea        Plan:     MDS  - Initially with 5 q minus syndrome and treated with Revlimid. Developed allergy and was then desensitized. Soon after developed pancytopenia and Revlimid held since June 2017.    - BMBX on 6/8/17 was with normal cytogenetics. 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 Discussed treatment options of HMA therapy or repeat trial of Revlimid and patient wished to proceed with Revlimid   - Revlimid stopped again due to repeat allergic reaction and pancytopenia 3/2019  - Started cycle 1 Vidaza 5/6/19 subq for 5 days every 28 days  - Restaging bone marrow biopsy following cycle 5 from 10/24/19 shows persistent MDS, no excess blasts and 3 of 20 metaphases with 5q deletion  - Tolerated cycles 1-9 well thus far. Package insert for Vidaza recommends holding if ANC <1000, however pt has been receiving this with an ANC below 1000 for each cycle. Per Dr. Valdes, okay to continue to give despite neutropenia. Will proceed with C10 today  - Patient is interesting in getting a port to avoid continuing to be stuck for IVs for labs, blood tranfusions, etc. Platelets are normal today. Does have low ANC. Discussed benefits and risks to a port with patient during visit. She still wishes to proceed. Order placed for port placement. Will get scheduled    Cytopenias 2/2 disease: anemia and leukopenia  - Transfuse for hgb < 7 or plts < 10K  - WBC 2.23; , Hgb 8.1; Plt 303K  - Continue ppx cipro, acyclovir, and fluconazole    DM2  - management per PCP  - continue metformin    HTN  - management per PCP  - continue lisinopril-HCTZ    CAD  - continue praluent for HLD per PCP (intolerant to statins)  -  continue ASA    Adjustment disorder  - Improved mood on Celexa. Continue    Myalgias/possible sciatica  - started trial of gabapentin 100 mg bid 11/4/19, much improved; now only taking gabapentin once a day in AM  - patient has returned to [employer] and is now doing silver sneakers with a friend; she is very excited about this (3x per week)    Insonmia  - using melatonin OTC    Hypokalemia  - potassium level today is 2.9; likely from diarrhea  - will give patient 60mEq potassium today with vidaza injection  - will prescribe 20mEq daily to start tomorrow. Patient instructed to take only if having diarrhea.  - will monitor a CMP with labs next Monday    Chemo-induced diarrhea  - patient reports 5-6 episodes a day  - likely cause of NOEL and hypokalemia  - encouraged to take 1 imodium a day as needed; patient states she has tried to take 2 in the past and it then causes constipation  - encouraged increased PO fluid intake    NOEL/CKD  - creatinine up to 1.5 today  - encouraged increased PO intake  - will continue to monitor  - will have repeat CMP with labs next Monday      Follow up:  - Has vidaza scheduled through 2/7/2020  - Please schedule CBC and type and screen weekly on Mondays for possible transfusions through month of February.  - Schedule return visit with CBC, type and screen, CMP and appt with Dr. Valdes on Tuesday 3/3/20  - Schedule chemo chair for cycle 11 vidaza 3/3/20 through 3/7/20  - Order for port placement entered 2/3/20. Will get scheduled      Eden Salmeron NP  Hematology/Oncology/BMT

## 2020-02-03 ENCOUNTER — OFFICE VISIT (OUTPATIENT)
Dept: HEMATOLOGY/ONCOLOGY | Facility: CLINIC | Age: 70
End: 2020-02-03
Payer: MEDICARE

## 2020-02-03 ENCOUNTER — INFUSION (OUTPATIENT)
Dept: INFUSION THERAPY | Facility: HOSPITAL | Age: 70
End: 2020-02-03
Attending: INTERNAL MEDICINE
Payer: MEDICARE

## 2020-02-03 VITALS
SYSTOLIC BLOOD PRESSURE: 130 MMHG | HEART RATE: 88 BPM | BODY MASS INDEX: 29.31 KG/M2 | OXYGEN SATURATION: 97 % | DIASTOLIC BLOOD PRESSURE: 60 MMHG | WEIGHT: 176.13 LBS | RESPIRATION RATE: 16 BRPM | TEMPERATURE: 98 F

## 2020-02-03 VITALS
HEIGHT: 65 IN | HEART RATE: 81 BPM | BODY MASS INDEX: 29.34 KG/M2 | SYSTOLIC BLOOD PRESSURE: 152 MMHG | WEIGHT: 176.13 LBS | DIASTOLIC BLOOD PRESSURE: 65 MMHG | RESPIRATION RATE: 18 BRPM

## 2020-02-03 DIAGNOSIS — I10 ESSENTIAL HYPERTENSION: ICD-10-CM

## 2020-02-03 DIAGNOSIS — N18.30 CONTROLLED TYPE 2 DIABETES MELLITUS WITH STAGE 3 CHRONIC KIDNEY DISEASE, WITHOUT LONG-TERM CURRENT USE OF INSULIN: ICD-10-CM

## 2020-02-03 DIAGNOSIS — D46.C MDS (MYELODYSPLASTIC SYNDROME) WITH 5Q DELETION: ICD-10-CM

## 2020-02-03 DIAGNOSIS — E11.22 CONTROLLED TYPE 2 DIABETES MELLITUS WITH STAGE 3 CHRONIC KIDNEY DISEASE, WITHOUT LONG-TERM CURRENT USE OF INSULIN: ICD-10-CM

## 2020-02-03 DIAGNOSIS — E87.6 HYPOKALEMIA: Primary | ICD-10-CM

## 2020-02-03 DIAGNOSIS — D46.C MDS (MYELODYSPLASTIC SYNDROME) WITH 5Q DELETION: Primary | ICD-10-CM

## 2020-02-03 DIAGNOSIS — I25.10 CORONARY ARTERY DISEASE, ANGINA PRESENCE UNSPECIFIED, UNSPECIFIED VESSEL OR LESION TYPE, UNSPECIFIED WHETHER NATIVE OR TRANSPLANTED HEART: ICD-10-CM

## 2020-02-03 DIAGNOSIS — E87.6 HYPOKALEMIA: ICD-10-CM

## 2020-02-03 DIAGNOSIS — M54.32 BILATERAL SCIATICA: ICD-10-CM

## 2020-02-03 DIAGNOSIS — F43.23 ADJUSTMENT DISORDER WITH MIXED ANXIETY AND DEPRESSED MOOD: ICD-10-CM

## 2020-02-03 DIAGNOSIS — D64.9 ANEMIA REQUIRING TRANSFUSIONS: ICD-10-CM

## 2020-02-03 DIAGNOSIS — G47.00 INSOMNIA, UNSPECIFIED TYPE: ICD-10-CM

## 2020-02-03 DIAGNOSIS — D70.8 OTHER NEUTROPENIA: ICD-10-CM

## 2020-02-03 DIAGNOSIS — K52.1 CHEMOTHERAPY INDUCED DIARRHEA: ICD-10-CM

## 2020-02-03 DIAGNOSIS — M54.31 BILATERAL SCIATICA: ICD-10-CM

## 2020-02-03 DIAGNOSIS — N17.9 AKI (ACUTE KIDNEY INJURY): ICD-10-CM

## 2020-02-03 DIAGNOSIS — T45.1X5A CHEMOTHERAPY INDUCED DIARRHEA: ICD-10-CM

## 2020-02-03 DIAGNOSIS — M79.10 MYALGIA: ICD-10-CM

## 2020-02-03 PROBLEM — I65.23 BILATERAL CAROTID ARTERY STENOSIS: Status: ACTIVE | Noted: 2020-02-03

## 2020-02-03 PROBLEM — E86.0 DEHYDRATION: Status: RESOLVED | Noted: 2019-07-15 | Resolved: 2020-02-03

## 2020-02-03 PROBLEM — J96.01 ACUTE HYPOXEMIC RESPIRATORY FAILURE: Status: RESOLVED | Noted: 2019-03-13 | Resolved: 2020-02-03

## 2020-02-03 PROCEDURE — 3078F PR MOST RECENT DIASTOLIC BLOOD PRESSURE < 80 MM HG: ICD-10-PCS | Mod: HCNC,CPTII,S$GLB, | Performed by: NURSE PRACTITIONER

## 2020-02-03 PROCEDURE — 3078F DIAST BP <80 MM HG: CPT | Mod: HCNC,CPTII,S$GLB, | Performed by: NURSE PRACTITIONER

## 2020-02-03 PROCEDURE — 99999 PR PBB SHADOW E&M-EST. PATIENT-LVL III: CPT | Mod: PBBFAC,HCNC,, | Performed by: NURSE PRACTITIONER

## 2020-02-03 PROCEDURE — 63600175 PHARM REV CODE 636 W HCPCS: Mod: JW,JG,HCNC | Performed by: INTERNAL MEDICINE

## 2020-02-03 PROCEDURE — 3075F PR MOST RECENT SYSTOLIC BLOOD PRESS GE 130-139MM HG: ICD-10-PCS | Mod: HCNC,CPTII,S$GLB, | Performed by: NURSE PRACTITIONER

## 2020-02-03 PROCEDURE — 1125F AMNT PAIN NOTED PAIN PRSNT: CPT | Mod: HCNC,S$GLB,, | Performed by: NURSE PRACTITIONER

## 2020-02-03 PROCEDURE — 99499 UNLISTED E&M SERVICE: CPT | Mod: S$PBB,HCNC,, | Performed by: NURSE PRACTITIONER

## 2020-02-03 PROCEDURE — 1101F PR PT FALLS ASSESS DOC 0-1 FALLS W/OUT INJ PAST YR: ICD-10-PCS | Mod: HCNC,CPTII,S$GLB, | Performed by: NURSE PRACTITIONER

## 2020-02-03 PROCEDURE — 1125F PR PAIN SEVERITY QUANTIFIED, PAIN PRESENT: ICD-10-PCS | Mod: HCNC,S$GLB,, | Performed by: NURSE PRACTITIONER

## 2020-02-03 PROCEDURE — 99499 RISK ADDL DX/OHS AUDIT: ICD-10-PCS | Mod: S$PBB,HCNC,, | Performed by: NURSE PRACTITIONER

## 2020-02-03 PROCEDURE — 96401 CHEMO ANTI-NEOPL SQ/IM: CPT | Mod: HCNC

## 2020-02-03 PROCEDURE — 25000003 PHARM REV CODE 250: Mod: HCNC | Performed by: NURSE PRACTITIONER

## 2020-02-03 PROCEDURE — 3075F SYST BP GE 130 - 139MM HG: CPT | Mod: HCNC,CPTII,S$GLB, | Performed by: NURSE PRACTITIONER

## 2020-02-03 PROCEDURE — 99215 PR OFFICE/OUTPT VISIT, EST, LEVL V, 40-54 MIN: ICD-10-PCS | Mod: HCNC,S$GLB,, | Performed by: NURSE PRACTITIONER

## 2020-02-03 PROCEDURE — 99215 OFFICE O/P EST HI 40 MIN: CPT | Mod: HCNC,S$GLB,, | Performed by: NURSE PRACTITIONER

## 2020-02-03 PROCEDURE — 1159F PR MEDICATION LIST DOCUMENTED IN MEDICAL RECORD: ICD-10-PCS | Mod: HCNC,S$GLB,, | Performed by: NURSE PRACTITIONER

## 2020-02-03 PROCEDURE — 1159F MED LIST DOCD IN RCRD: CPT | Mod: HCNC,S$GLB,, | Performed by: NURSE PRACTITIONER

## 2020-02-03 PROCEDURE — 99999 PR PBB SHADOW E&M-EST. PATIENT-LVL III: ICD-10-PCS | Mod: PBBFAC,HCNC,, | Performed by: NURSE PRACTITIONER

## 2020-02-03 PROCEDURE — 1101F PT FALLS ASSESS-DOCD LE1/YR: CPT | Mod: HCNC,CPTII,S$GLB, | Performed by: NURSE PRACTITIONER

## 2020-02-03 RX ORDER — POTASSIUM CHLORIDE 750 MG/1
60 TABLET, EXTENDED RELEASE ORAL ONCE
Status: CANCELLED
Start: 2020-02-03

## 2020-02-03 RX ORDER — POTASSIUM CHLORIDE 750 MG/1
60 TABLET, EXTENDED RELEASE ORAL
Status: CANCELLED
Start: 2020-02-03

## 2020-02-03 RX ORDER — POTASSIUM CHLORIDE 20 MEQ/1
40 TABLET, EXTENDED RELEASE ORAL ONCE
Status: COMPLETED | OUTPATIENT
Start: 2020-02-03 | End: 2020-02-03

## 2020-02-03 RX ORDER — ACETAMINOPHEN, DIPHENHYDRAMINE HCL, PHENYLEPHRINE HCL 325; 25; 5 MG/1; MG/1; MG/1
10 TABLET ORAL DAILY
Start: 2020-02-03 | End: 2022-01-01 | Stop reason: SDUPTHER

## 2020-02-03 RX ORDER — ONDANSETRON 2 MG/ML
8 INJECTION INTRAMUSCULAR; INTRAVENOUS
Status: CANCELLED | OUTPATIENT
Start: 2020-02-03

## 2020-02-03 RX ORDER — ONDANSETRON 2 MG/ML
8 INJECTION INTRAMUSCULAR; INTRAVENOUS
Status: CANCELLED | OUTPATIENT
Start: 2020-02-06

## 2020-02-03 RX ORDER — ONDANSETRON 2 MG/ML
8 INJECTION INTRAMUSCULAR; INTRAVENOUS
Status: CANCELLED
Start: 2020-02-05

## 2020-02-03 RX ORDER — AZACITIDINE 100 MG/1
75 INJECTION, POWDER, LYOPHILIZED, FOR SOLUTION INTRAVENOUS; SUBCUTANEOUS
Status: COMPLETED | OUTPATIENT
Start: 2020-02-03 | End: 2020-02-03

## 2020-02-03 RX ORDER — ONDANSETRON 2 MG/ML
8 INJECTION INTRAMUSCULAR; INTRAVENOUS
Status: CANCELLED | OUTPATIENT
Start: 2020-02-04

## 2020-02-03 RX ORDER — AZACITIDINE 100 MG/1
75 INJECTION, POWDER, LYOPHILIZED, FOR SOLUTION INTRAVENOUS; SUBCUTANEOUS
Status: CANCELLED | OUTPATIENT
Start: 2020-02-05

## 2020-02-03 RX ORDER — GABAPENTIN 100 MG/1
100 CAPSULE ORAL DAILY
Qty: 60 CAPSULE | Refills: 2
Start: 2020-02-03 | End: 2020-03-17 | Stop reason: SDUPTHER

## 2020-02-03 RX ORDER — AZACITIDINE 100 MG/1
75 INJECTION, POWDER, LYOPHILIZED, FOR SOLUTION INTRAVENOUS; SUBCUTANEOUS
Status: CANCELLED | OUTPATIENT
Start: 2020-02-06

## 2020-02-03 RX ORDER — AZACITIDINE 100 MG/1
75 INJECTION, POWDER, LYOPHILIZED, FOR SOLUTION INTRAVENOUS; SUBCUTANEOUS
Status: CANCELLED | OUTPATIENT
Start: 2020-02-04

## 2020-02-03 RX ORDER — POTASSIUM CHLORIDE 750 MG/1
40 TABLET, EXTENDED RELEASE ORAL ONCE
Status: CANCELLED
Start: 2020-02-03

## 2020-02-03 RX ORDER — ONDANSETRON 2 MG/ML
8 INJECTION INTRAMUSCULAR; INTRAVENOUS
Status: DISCONTINUED | OUTPATIENT
Start: 2020-02-03 | End: 2020-02-03 | Stop reason: HOSPADM

## 2020-02-03 RX ORDER — ONDANSETRON 2 MG/ML
8 INJECTION INTRAMUSCULAR; INTRAVENOUS
Status: CANCELLED | OUTPATIENT
Start: 2020-02-07

## 2020-02-03 RX ORDER — POTASSIUM CHLORIDE 20 MEQ/1
20 TABLET, EXTENDED RELEASE ORAL DAILY
Qty: 30 TABLET | Refills: 4 | Status: SHIPPED | OUTPATIENT
Start: 2020-02-03 | End: 2020-03-17 | Stop reason: SDUPTHER

## 2020-02-03 RX ORDER — AZACITIDINE 100 MG/1
75 INJECTION, POWDER, LYOPHILIZED, FOR SOLUTION INTRAVENOUS; SUBCUTANEOUS
Status: CANCELLED | OUTPATIENT
Start: 2020-02-07

## 2020-02-03 RX ORDER — AZACITIDINE 100 MG/1
75 INJECTION, POWDER, LYOPHILIZED, FOR SOLUTION INTRAVENOUS; SUBCUTANEOUS
Status: CANCELLED | OUTPATIENT
Start: 2020-02-03

## 2020-02-03 RX ADMIN — AZACITIDINE 150 MG: 100 INJECTION, POWDER, LYOPHILIZED, FOR SOLUTION INTRAVENOUS; SUBCUTANEOUS at 11:02

## 2020-02-03 RX ADMIN — POTASSIUM CHLORIDE 40 MEQ: 1500 TABLET, EXTENDED RELEASE ORAL at 11:02

## 2020-02-03 NOTE — Clinical Note
- Please schedule CBC and type and screen weekly on Mondays for possible transfusions through month of February.- Schedule return visit with CBC, type and screen, CMP and appt with Dr. Valdes on Tuesday 3/3/20- Schedule chemo chair for cycle 11 vidaza 3/3/20 through 3/7/20

## 2020-02-03 NOTE — NURSING
Pt tolerated vidaza x3 injections subcu in abd. VSS. No new complaints. PO KCl given. Will return tomorrow.

## 2020-02-04 ENCOUNTER — INFUSION (OUTPATIENT)
Dept: INFUSION THERAPY | Facility: HOSPITAL | Age: 70
End: 2020-02-04
Attending: INTERNAL MEDICINE
Payer: MEDICARE

## 2020-02-04 ENCOUNTER — OUTPATIENT CASE MANAGEMENT (OUTPATIENT)
Dept: ADMINISTRATIVE | Facility: OTHER | Age: 70
End: 2020-02-04

## 2020-02-04 VITALS — SYSTOLIC BLOOD PRESSURE: 114 MMHG | HEART RATE: 88 BPM | DIASTOLIC BLOOD PRESSURE: 52 MMHG

## 2020-02-04 DIAGNOSIS — D46.C MDS (MYELODYSPLASTIC SYNDROME) WITH 5Q DELETION: Primary | ICD-10-CM

## 2020-02-04 PROCEDURE — 63600175 PHARM REV CODE 636 W HCPCS: Mod: JW,JG,HCNC | Performed by: INTERNAL MEDICINE

## 2020-02-04 PROCEDURE — 96401 CHEMO ANTI-NEOPL SQ/IM: CPT | Mod: HCNC

## 2020-02-04 RX ORDER — AZACITIDINE 100 MG/1
75 INJECTION, POWDER, LYOPHILIZED, FOR SOLUTION INTRAVENOUS; SUBCUTANEOUS
Status: COMPLETED | OUTPATIENT
Start: 2020-02-04 | End: 2020-02-04

## 2020-02-04 RX ADMIN — AZACITIDINE 150 MG: 100 INJECTION, POWDER, LYOPHILIZED, FOR SOLUTION INTRAVENOUS; SUBCUTANEOUS at 11:02

## 2020-02-05 ENCOUNTER — TELEPHONE (OUTPATIENT)
Dept: INTERVENTIONAL RADIOLOGY/VASCULAR | Facility: HOSPITAL | Age: 70
End: 2020-02-05

## 2020-02-05 ENCOUNTER — INFUSION (OUTPATIENT)
Dept: INFUSION THERAPY | Facility: HOSPITAL | Age: 70
End: 2020-02-05
Attending: INTERNAL MEDICINE
Payer: MEDICARE

## 2020-02-05 VITALS — SYSTOLIC BLOOD PRESSURE: 141 MMHG | HEART RATE: 89 BPM | DIASTOLIC BLOOD PRESSURE: 66 MMHG

## 2020-02-05 DIAGNOSIS — D46.C MDS (MYELODYSPLASTIC SYNDROME) WITH 5Q DELETION: Primary | ICD-10-CM

## 2020-02-05 PROCEDURE — 63600175 PHARM REV CODE 636 W HCPCS: Mod: JG,HCNC | Performed by: INTERNAL MEDICINE

## 2020-02-05 PROCEDURE — 96401 CHEMO ANTI-NEOPL SQ/IM: CPT | Mod: HCNC

## 2020-02-05 RX ORDER — AZACITIDINE 100 MG/1
75 INJECTION, POWDER, LYOPHILIZED, FOR SOLUTION INTRAVENOUS; SUBCUTANEOUS
Status: COMPLETED | OUTPATIENT
Start: 2020-02-05 | End: 2020-02-05

## 2020-02-05 RX ADMIN — AZACITIDINE 150 MG: 100 INJECTION, POWDER, LYOPHILIZED, FOR SOLUTION INTRAVENOUS; SUBCUTANEOUS at 11:02

## 2020-02-06 ENCOUNTER — TELEPHONE (OUTPATIENT)
Dept: INTERVENTIONAL RADIOLOGY/VASCULAR | Facility: HOSPITAL | Age: 70
End: 2020-02-06

## 2020-02-06 ENCOUNTER — INFUSION (OUTPATIENT)
Dept: INFUSION THERAPY | Facility: HOSPITAL | Age: 70
End: 2020-02-06
Attending: INTERNAL MEDICINE
Payer: MEDICARE

## 2020-02-06 VITALS — SYSTOLIC BLOOD PRESSURE: 138 MMHG | DIASTOLIC BLOOD PRESSURE: 66 MMHG | HEART RATE: 83 BPM | RESPIRATION RATE: 18 BRPM

## 2020-02-06 DIAGNOSIS — D46.C MDS (MYELODYSPLASTIC SYNDROME) WITH 5Q DELETION: Primary | ICD-10-CM

## 2020-02-06 PROCEDURE — 63600175 PHARM REV CODE 636 W HCPCS: Mod: JG,HCNC | Performed by: INTERNAL MEDICINE

## 2020-02-06 PROCEDURE — 96401 CHEMO ANTI-NEOPL SQ/IM: CPT | Mod: HCNC

## 2020-02-06 RX ORDER — ONDANSETRON 2 MG/ML
8 INJECTION INTRAMUSCULAR; INTRAVENOUS
Status: DISCONTINUED | OUTPATIENT
Start: 2020-02-06 | End: 2020-02-06 | Stop reason: HOSPADM

## 2020-02-06 RX ORDER — AZACITIDINE 100 MG/1
75 INJECTION, POWDER, LYOPHILIZED, FOR SOLUTION INTRAVENOUS; SUBCUTANEOUS
Status: COMPLETED | OUTPATIENT
Start: 2020-02-06 | End: 2020-02-06

## 2020-02-06 RX ADMIN — AZACITIDINE 150 MG: 100 INJECTION, POWDER, LYOPHILIZED, FOR SOLUTION INTRAVENOUS; SUBCUTANEOUS at 11:02

## 2020-02-06 NOTE — PROGRESS NOTES
2/6/2020-Chart review completed. Patient with risk score of 44%. Last HgbA1C was 5.3 (drawn in 2017)    Reason for referral:  Diagnosis   N18.3 (ICD-10-CM) - Stage 3 chronic kidney disease   D46.C (ICD-10-CM) - MDS (myelodysplastic syndrome) with 5q deletion   E11.22,N18.3 (ICD-10-CM) - Controlled type 2 diabetes mellitus with stage 3 chronic kidney disease, without long-term current use of insulin   Z99.89 (ICD-10-CM) - Dependence on other enabling machines and devices   R26.9 (ICD-10-CM) - Abnormality of gait and mobility       Referral Notes :   Patient is inquiring about medical necessity form in order to get more resources at home.                Will refer to OPCM Low risk team for assistance with needs.  Danial Matos RN  Outpatient Care Management

## 2020-02-07 ENCOUNTER — TELEPHONE (OUTPATIENT)
Dept: INTERVENTIONAL RADIOLOGY/VASCULAR | Facility: HOSPITAL | Age: 70
End: 2020-02-07

## 2020-02-07 ENCOUNTER — INFUSION (OUTPATIENT)
Dept: INFUSION THERAPY | Facility: HOSPITAL | Age: 70
End: 2020-02-07
Attending: INTERNAL MEDICINE
Payer: MEDICARE

## 2020-02-07 VITALS
OXYGEN SATURATION: 98 % | HEART RATE: 108 BPM | RESPIRATION RATE: 18 BRPM | DIASTOLIC BLOOD PRESSURE: 62 MMHG | TEMPERATURE: 98 F | SYSTOLIC BLOOD PRESSURE: 140 MMHG

## 2020-02-07 DIAGNOSIS — D46.C MDS (MYELODYSPLASTIC SYNDROME) WITH 5Q DELETION: Primary | ICD-10-CM

## 2020-02-07 PROCEDURE — 96401 CHEMO ANTI-NEOPL SQ/IM: CPT | Mod: HCNC

## 2020-02-07 PROCEDURE — 63600175 PHARM REV CODE 636 W HCPCS: Mod: JG,HCNC | Performed by: INTERNAL MEDICINE

## 2020-02-07 RX ORDER — AZACITIDINE 100 MG/1
75 INJECTION, POWDER, LYOPHILIZED, FOR SOLUTION INTRAVENOUS; SUBCUTANEOUS
Status: COMPLETED | OUTPATIENT
Start: 2020-02-07 | End: 2020-02-07

## 2020-02-07 RX ADMIN — AZACITIDINE 150 MG: 100 INJECTION, POWDER, LYOPHILIZED, FOR SOLUTION INTRAVENOUS; SUBCUTANEOUS at 11:02

## 2020-02-07 NOTE — NURSING
Vidaza Inj given to R, L and mid abd SQ. Tolerated well without event. Ambulated off unit using walker.

## 2020-02-10 ENCOUNTER — INFUSION (OUTPATIENT)
Dept: INFUSION THERAPY | Facility: HOSPITAL | Age: 70
End: 2020-02-10
Attending: OPTOMETRIST
Payer: MEDICARE

## 2020-02-10 ENCOUNTER — OUTPATIENT CASE MANAGEMENT (OUTPATIENT)
Dept: ADMINISTRATIVE | Facility: OTHER | Age: 70
End: 2020-02-10

## 2020-02-10 VITALS
HEIGHT: 65 IN | HEART RATE: 88 BPM | SYSTOLIC BLOOD PRESSURE: 172 MMHG | RESPIRATION RATE: 17 BRPM | WEIGHT: 176 LBS | BODY MASS INDEX: 29.32 KG/M2 | TEMPERATURE: 98 F | DIASTOLIC BLOOD PRESSURE: 84 MMHG

## 2020-02-10 DIAGNOSIS — D64.9 ANEMIA REQUIRING TRANSFUSIONS: Primary | ICD-10-CM

## 2020-02-10 DIAGNOSIS — D64.9 ANEMIA REQUIRING TRANSFUSIONS: ICD-10-CM

## 2020-02-10 PROCEDURE — 25000003 PHARM REV CODE 250: Mod: HCNC | Performed by: NURSE PRACTITIONER

## 2020-02-10 PROCEDURE — P9040 RBC LEUKOREDUCED IRRADIATED: HCPCS | Mod: HCNC

## 2020-02-10 PROCEDURE — 36430 TRANSFUSION BLD/BLD COMPNT: CPT | Mod: HCNC

## 2020-02-10 PROCEDURE — 86920 COMPATIBILITY TEST SPIN: CPT | Mod: HCNC

## 2020-02-10 PROCEDURE — 63600175 PHARM REV CODE 636 W HCPCS: Mod: HCNC | Performed by: NURSE PRACTITIONER

## 2020-02-10 RX ORDER — HYDROCODONE BITARTRATE AND ACETAMINOPHEN 500; 5 MG/1; MG/1
TABLET ORAL ONCE
Status: COMPLETED | OUTPATIENT
Start: 2020-02-10 | End: 2020-02-10

## 2020-02-10 RX ORDER — HYDROCODONE BITARTRATE AND ACETAMINOPHEN 500; 5 MG/1; MG/1
TABLET ORAL ONCE
Status: CANCELLED | OUTPATIENT
Start: 2020-02-10 | End: 2020-02-10

## 2020-02-10 RX ORDER — DIPHENHYDRAMINE HCL 25 MG
25 CAPSULE ORAL
Status: CANCELLED | OUTPATIENT
Start: 2020-02-10

## 2020-02-10 RX ORDER — ACETAMINOPHEN 325 MG/1
650 TABLET ORAL
Status: CANCELLED | OUTPATIENT
Start: 2020-02-10

## 2020-02-10 RX ORDER — DIPHENHYDRAMINE HCL 25 MG
25 CAPSULE ORAL
Status: COMPLETED | OUTPATIENT
Start: 2020-02-10 | End: 2020-02-10

## 2020-02-10 RX ORDER — ACETAMINOPHEN 325 MG/1
650 TABLET ORAL
Status: COMPLETED | OUTPATIENT
Start: 2020-02-10 | End: 2020-02-10

## 2020-02-10 RX ADMIN — SODIUM CHLORIDE: 9 INJECTION, SOLUTION INTRAVENOUS at 12:02

## 2020-02-10 RX ADMIN — DIPHENHYDRAMINE HYDROCHLORIDE 25 MG: 25 CAPSULE ORAL at 12:02

## 2020-02-10 RX ADMIN — ACETAMINOPHEN 650 MG: 325 TABLET ORAL at 12:02

## 2020-02-10 NOTE — PLAN OF CARE
1240 pt here for 1 unit PRBC (consent current 1/27/20), labs, hx, meds, allergies reviewed, pt with no complaints at this time, reclined in chair, continue to monitor

## 2020-02-10 NOTE — PROGRESS NOTES
Outpatient Care Management   - Low Risk Patient Assessment    Patient: Susan Puente  MRN:  [id number]  Date of Service:  2/10/2020  Completed by:  Abbi Hansen LMSW  Referral Date: 02/03/2020  Program: OPCM Low Risk    Reason for Visit   Patient presents with    Social Work Assessment - Low/Mod Risk    PHQ-9       Patient Summary     OPCM Social Work Assessment (Low/Moderate Risk)    General  Level of Caregiver support:  Assistance needed but no caregiver available  Have you had to make a decision between paying for any of the following in the last 2 months?:  Medication, Medical Care  Transportation means:  Self  Employment status:  Disabled  Current symptoms:  None  Assessments  Was the PHQ Depression Screening completed this visit?:  Yes  Was the JASON-7 Screening completed this visit?:  No     This LMSW received a referral on the above patient.   Reason for referral: low risk, Financial assistance, Home Delivered Meals  Name of the community resource that was provided: [employer] (Chronic Condition Plan), Cancer Resources/Assistance Programs, [facility]-Assistance Application, [employer] [facility]-Assistance Application, Senior Resource Guide  Resource given to: Patient via [place] mail to patient's address on file    LMSW completed SW assessment with patient. Patient reports living alone and reports needing assistance with completing daily activities. Patient reports uses rollator to get around when she is outside of her home. Patient reports monthly income is approximately 1600 dollars. Patient reports she reverse mortgage and leased vehicle to pay each month. Patient reports having difficulty affording some medical expenses. Patient reports she has leukemia and needs to have her blood drawn every week. Patient reports co-pays are challenging to cover. She reports applying for [facility] Financial assistance and reports being denied for  assistance. Patient reports she is appealing their decision. LMSW informed patient about [employer] assistance and offered to provide her with their application. Patient voiced agreement with this recommendation. Patient discussed having difficulty completing household chores and errands recently. She reports feeling weaker and not being able to cook her own food or manager her personal care the way she would like. Patient interested in receiving assistance from [employer] insurance with homemaker activities or personal care. Patient informed that traditional insurance does not cover non-medical services. Patient informed that [employer] will cover services that are medically necessary through home health including PT, OT, ST or skilled nursing. Patient reports she has considered requesting home health services from PCP. Patient reports she has upcoming appointment to see her doctor next month. Patient discussed needing meals delivered to her home and inquired about resources available to help with meals. LMSW offered to contact [employer] to identify if patient is eligible for meals through Chronic conditions benefit. LMSW informed patient that she will contact her tomorrow to follow.     Plan:   LMSW will contact [employer] tomorrow to identify if patient is eligible for meals and contact patient to follow up on status of chronic conditions benefits.       Complex Care Plan     Care plan was discussed and completed today with input from patient and/or caregiver.    Goals      Patient/caregiver will have knowledge of resources available in order to obtain the services that are needed prior to the end of this encounter.      Overall Time to Completion  1 week from 02/10/2020    Social Work Identified Patient Barriers:   Medical Care    Short Term Goals  Patient/caregiver will verbalize knowledge of available resources prior to the end of this encounter.   Interventions:  · Discuss and provide community resources by US  Mail  Status:  · Met              Patient Instructions     No follow-ups on file.    Todays OPCM Self-Management Care Plan was developed with the patients/caregivers input and was based on identified barriers from todays assessment.  Goals were written today with the patient/caregiver and the patient has agreed to work towards these goals to improve his/her overall well-being. Patient verbalized understanding of the care plan, goals, and all of today's instructions. Encouraged patient/caregiver to communicate with his/her physician and health care team about health conditions and the treatment plan.  Provided my contact information today and encouraged patient/caregiver to call me with any questions as needed.

## 2020-02-10 NOTE — PLAN OF CARE
1530 pt tolerated 1 unit PRBC without issue, pt has no upcoming appts scheduled at this time, no distress noted upon d/c to home

## 2020-02-11 ENCOUNTER — OUTPATIENT CASE MANAGEMENT (OUTPATIENT)
Dept: ADMINISTRATIVE | Facility: OTHER | Age: 70
End: 2020-02-11

## 2020-02-11 ENCOUNTER — TELEPHONE (OUTPATIENT)
Dept: INTERVENTIONAL RADIOLOGY/VASCULAR | Facility: HOSPITAL | Age: 70
End: 2020-02-11

## 2020-02-11 NOTE — PROGRESS NOTES
Follow Up  Reason for referral: Home Delivered Meals    LMSW contacted [employer] Program to identify if patient eligible for meal benefit. Per [employer] representative patient is eligible for 20 meals per each chronic condition. Rep states patient has 3 chronic conditions and patient is eligible to receive a total of 60 meals this year. Patient would need to call their customer service line and they will assist her with getting meals scheduled for delivery. LMSW contacted patient and provided her with contact number to schedule meal deliveries. Patient thanked LMSW for assistance and stated no additional concerns. Resources mailed to patient's address on file. Case closed. Referral source notified.

## 2020-02-12 ENCOUNTER — PATIENT MESSAGE (OUTPATIENT)
Dept: HEMATOLOGY/ONCOLOGY | Facility: CLINIC | Age: 70
End: 2020-02-12

## 2020-02-12 DIAGNOSIS — D46.C MDS (MYELODYSPLASTIC SYNDROME) WITH 5Q DELETION: Primary | ICD-10-CM

## 2020-02-17 ENCOUNTER — INFUSION (OUTPATIENT)
Dept: INFUSION THERAPY | Facility: HOSPITAL | Age: 70
End: 2020-02-17
Attending: OPTOMETRIST
Payer: MEDICARE

## 2020-02-17 VITALS
HEART RATE: 69 BPM | DIASTOLIC BLOOD PRESSURE: 67 MMHG | SYSTOLIC BLOOD PRESSURE: 148 MMHG | RESPIRATION RATE: 18 BRPM | TEMPERATURE: 98 F

## 2020-02-17 DIAGNOSIS — D64.9 ANEMIA REQUIRING TRANSFUSIONS: ICD-10-CM

## 2020-02-17 DIAGNOSIS — D46.C MDS (MYELODYSPLASTIC SYNDROME) WITH 5Q DELETION: ICD-10-CM

## 2020-02-17 DIAGNOSIS — D46.C MDS (MYELODYSPLASTIC SYNDROME) WITH 5Q DELETION: Primary | ICD-10-CM

## 2020-02-17 PROCEDURE — 25000003 PHARM REV CODE 250: Mod: HCNC | Performed by: NURSE PRACTITIONER

## 2020-02-17 PROCEDURE — 36430 TRANSFUSION BLD/BLD COMPNT: CPT | Mod: HCNC

## 2020-02-17 PROCEDURE — P9040 RBC LEUKOREDUCED IRRADIATED: HCPCS | Mod: HCNC

## 2020-02-17 PROCEDURE — 86920 COMPATIBILITY TEST SPIN: CPT | Mod: HCNC

## 2020-02-17 RX ORDER — ACETAMINOPHEN 325 MG/1
650 TABLET ORAL
Status: CANCELLED | OUTPATIENT
Start: 2020-02-17

## 2020-02-17 RX ORDER — HYDROCODONE BITARTRATE AND ACETAMINOPHEN 500; 5 MG/1; MG/1
TABLET ORAL ONCE
Status: CANCELLED | OUTPATIENT
Start: 2020-02-17 | End: 2020-02-17

## 2020-02-17 RX ORDER — DIPHENHYDRAMINE HCL 25 MG
25 CAPSULE ORAL
Status: CANCELLED | OUTPATIENT
Start: 2020-02-17

## 2020-02-17 RX ORDER — ACETAMINOPHEN 325 MG/1
650 TABLET ORAL
Status: COMPLETED | OUTPATIENT
Start: 2020-02-17 | End: 2020-02-17

## 2020-02-17 RX ORDER — HYDROCODONE BITARTRATE AND ACETAMINOPHEN 500; 5 MG/1; MG/1
TABLET ORAL ONCE
Status: DISCONTINUED | OUTPATIENT
Start: 2020-02-17 | End: 2020-02-17 | Stop reason: HOSPADM

## 2020-02-17 RX ORDER — DIPHENHYDRAMINE HCL 25 MG
25 CAPSULE ORAL
Status: COMPLETED | OUTPATIENT
Start: 2020-02-17 | End: 2020-02-17

## 2020-02-17 RX ADMIN — DIPHENHYDRAMINE HYDROCHLORIDE 25 MG: 25 CAPSULE ORAL at 01:02

## 2020-02-17 RX ADMIN — ACETAMINOPHEN 650 MG: 325 TABLET ORAL at 01:02

## 2020-02-18 DIAGNOSIS — D46.C MDS (MYELODYSPLASTIC SYNDROME) WITH 5Q DELETION: Primary | ICD-10-CM

## 2020-02-18 RX ORDER — FENTANYL CITRATE 50 UG/ML
50 INJECTION, SOLUTION INTRAMUSCULAR; INTRAVENOUS
Status: CANCELLED | OUTPATIENT
Start: 2020-02-18

## 2020-02-18 RX ORDER — MIDAZOLAM HYDROCHLORIDE 1 MG/ML
1 INJECTION INTRAMUSCULAR; INTRAVENOUS
Status: CANCELLED | OUTPATIENT
Start: 2020-02-18

## 2020-02-19 ENCOUNTER — HOSPITAL ENCOUNTER (OUTPATIENT)
Facility: HOSPITAL | Age: 70
Discharge: HOME | End: 2020-02-19
Attending: INTERNAL MEDICINE | Admitting: INTERNAL MEDICINE
Payer: MEDICARE

## 2020-02-19 VITALS
WEIGHT: 170 LBS | OXYGEN SATURATION: 99 % | TEMPERATURE: 98 F | BODY MASS INDEX: 28.32 KG/M2 | DIASTOLIC BLOOD PRESSURE: 67 MMHG | RESPIRATION RATE: 17 BRPM | HEART RATE: 84 BPM | SYSTOLIC BLOOD PRESSURE: 143 MMHG | HEIGHT: 65 IN

## 2020-02-19 DIAGNOSIS — D46.C MDS (MYELODYSPLASTIC SYNDROME) WITH 5Q DELETION: ICD-10-CM

## 2020-02-19 LAB — POCT GLUCOSE: 84 MG/DL (ref 70–110)

## 2020-02-19 PROCEDURE — 82962 GLUCOSE BLOOD TEST: CPT | Mod: HCNC

## 2020-02-19 PROCEDURE — 63600175 PHARM REV CODE 636 W HCPCS: Mod: HCNC | Performed by: FAMILY MEDICINE

## 2020-02-19 PROCEDURE — 63600175 PHARM REV CODE 636 W HCPCS: Mod: HCNC | Performed by: RADIOLOGY

## 2020-02-19 PROCEDURE — 25000003 PHARM REV CODE 250: Mod: HCNC | Performed by: INTERNAL MEDICINE

## 2020-02-19 PROCEDURE — 25000003 PHARM REV CODE 250: Mod: HCNC | Performed by: RADIOLOGY

## 2020-02-19 RX ORDER — LIDOCAINE HYDROCHLORIDE 10 MG/ML
1 INJECTION, SOLUTION EPIDURAL; INFILTRATION; INTRACAUDAL; PERINEURAL ONCE
Status: COMPLETED | OUTPATIENT
Start: 2020-02-19 | End: 2020-02-19

## 2020-02-19 RX ORDER — HEPARIN 100 UNIT/ML
SYRINGE INTRAVENOUS CODE/TRAUMA/SEDATION MEDICATION
Status: COMPLETED | OUTPATIENT
Start: 2020-02-19 | End: 2020-02-19

## 2020-02-19 RX ORDER — LIDOCAINE HYDROCHLORIDE 5 MG/ML
INJECTION, SOLUTION INFILTRATION; PERINEURAL CODE/TRAUMA/SEDATION MEDICATION
Status: COMPLETED | OUTPATIENT
Start: 2020-02-19 | End: 2020-02-19

## 2020-02-19 RX ORDER — FENTANYL CITRATE 50 UG/ML
INJECTION, SOLUTION INTRAMUSCULAR; INTRAVENOUS CODE/TRAUMA/SEDATION MEDICATION
Status: COMPLETED | OUTPATIENT
Start: 2020-02-19 | End: 2020-02-19

## 2020-02-19 RX ORDER — CIPROFLOXACIN 500 MG/1
500 TABLET ORAL 2 TIMES DAILY
COMMUNITY
End: 2020-03-05 | Stop reason: SDUPTHER

## 2020-02-19 RX ORDER — MIDAZOLAM HYDROCHLORIDE 1 MG/ML
INJECTION INTRAMUSCULAR; INTRAVENOUS CODE/TRAUMA/SEDATION MEDICATION
Status: COMPLETED | OUTPATIENT
Start: 2020-02-19 | End: 2020-02-19

## 2020-02-19 RX ORDER — CEFAZOLIN SODIUM 1 G/3ML
1 INJECTION, POWDER, FOR SOLUTION INTRAMUSCULAR; INTRAVENOUS
Status: COMPLETED | OUTPATIENT
Start: 2020-02-19 | End: 2020-02-19

## 2020-02-19 RX ORDER — HEPARIN SODIUM 200 [USP'U]/100ML
INJECTION, SOLUTION INTRAVENOUS
Status: COMPLETED | OUTPATIENT
Start: 2020-02-19 | End: 2020-02-19

## 2020-02-19 RX ORDER — SODIUM CHLORIDE 9 MG/ML
500 INJECTION, SOLUTION INTRAVENOUS ONCE
Status: COMPLETED | OUTPATIENT
Start: 2020-02-19 | End: 2020-02-19

## 2020-02-19 RX ADMIN — SODIUM BICARBONATE 5 ML: 0.2 INJECTION, SOLUTION INTRAVENOUS at 01:02

## 2020-02-19 RX ADMIN — HEPARIN SODIUM (PORCINE) LOCK FLUSH IV SOLN 100 UNIT/ML 10 ML: 100 SOLUTION at 01:02

## 2020-02-19 RX ADMIN — LIDOCAINE HYDROCHLORIDE 2 MG: 10 INJECTION, SOLUTION EPIDURAL; INFILTRATION; INTRACAUDAL; PERINEURAL at 11:02

## 2020-02-19 RX ADMIN — MIDAZOLAM HYDROCHLORIDE 1 MG: 1 INJECTION, SOLUTION INTRAMUSCULAR; INTRAVENOUS at 01:02

## 2020-02-19 RX ADMIN — FENTANYL CITRATE 50 MCG: 50 INJECTION, SOLUTION INTRAMUSCULAR; INTRAVENOUS at 01:02

## 2020-02-19 RX ADMIN — CEFAZOLIN 1 G: 330 INJECTION, POWDER, FOR SOLUTION INTRAMUSCULAR; INTRAVENOUS at 12:02

## 2020-02-19 RX ADMIN — LIDOCAINE HYDROCHLORIDE 10 ML: 5 INJECTION, SOLUTION INFILTRATION; PERINEURAL at 01:02

## 2020-02-19 RX ADMIN — SODIUM CHLORIDE 500 ML: 0.9 INJECTION, SOLUTION INTRAVENOUS at 11:02

## 2020-02-19 RX ADMIN — HEPARIN SODIUM IN SODIUM CHLORIDE 500 ML: 200 INJECTION INTRAVENOUS at 01:02

## 2020-02-19 NOTE — DISCHARGE INSTRUCTIONS
For questions or non emergent concerns call: Radiology clinic  MON-FRI 8 AM- 5PM @ [phone].     For immediate emergent concerns call Radiology resident on call @ [phone].

## 2020-02-19 NOTE — H&P
Radiology History & Physical      SUBJECTIVE:     Chief Complaint: MDS    History of Present Illness:  Susan Puente is a 69 y.o. female who presents for port placement.   Past Medical History:   Diagnosis Date    Allergic rhinitis     Allergy     Anemia     Anxiety     CAD (coronary artery disease)     Carotid stenosis     Controlled type 2 diabetes mellitus without complication, without long-term current use of insulin 10/19/2016    Depression     GERD (gastroesophageal reflux disease)     Hearing loss in right ear     Hx of psychiatric care     Celexa, Prozac    MDS (myelodysplastic syndrome) with 5q deletion     Psychiatric problem     Therapy      Past Surgical History:   Procedure Laterality Date    BONE MARROW BIOPSY Left 6/7/2018    Procedure: Biopsy-bone marrow;  Surgeon: Gita Valdes MD;  [facility];  Service: Oncology;  Laterality: Left;    BONE MARROW BIOPSY Left 3/21/2019    Procedure: Biopsy-bone marrow;  Surgeon: Gita Valdes MD;  Location: [facility] OR [facility];  Service: Oncology;  Laterality: Left;    BONE MARROW BIOPSY Left 10/24/2019    Procedure: Biopsy-bone marrow;  Surgeon: Gita Valdes MD;  Location: [facility] OR [facility];  Service: Oncology;  Laterality: Left;  left iliac crest    BUNIONECTOMY      CAROTID ENDARTERECTOMY Left 2011    CAROTID STENT      COLONOSCOPY N/A 12/20/2016    Procedure: COLONOSCOPY;  Surgeon: PATRICIA Simpson MD;  Location: [facility] ENDO (4TH FLR);  Service: Endoscopy;  Laterality: N/A;  pt has vomiting with anesthesia in past    ENDOMETRIAL ABLATION      for endometriosis    TONSILLECTOMY      TYMPANOSTOMY TUBE PLACEMENT         Home Meds:   Prior to Admission medications    Medication Sig Start Date End Date Taking? Authorizing Provider   acetaminophen (TYLENOL ARTHRITIS PAIN) 650 MG TbSR Take 650 mg by mouth every 8 (eight) hours as needed (for pain).   Yes Historical Provider, MD   acyclovir (ZOVIRAX) 400 MG tablet Take 1 tablet (400 mg  total) by mouth 2 (two) times daily. 10/7/19  Yes Gita Valdes MD   b complex vitamins capsule Take 1 capsule by mouth once daily.   Yes Historical Provider, MD   ciprofloxacin HCl (CIPRO) 500 MG tablet Take 500 mg by mouth 2 (two) times daily.   Yes Historical Provider, MD   citalopram (CELEXA) 20 MG tablet Take 1 tablet (20 mg total) by mouth once daily. 10/28/19  Yes Rory Sotomayor MD   fexofenadine 30 mg TbDL Take by mouth once daily.    Yes Historical Provider, MD   fluconazole (DIFLUCAN) 200 MG Tab Take 2 tablets (400 mg total) by mouth once daily. 1/6/20  Yes Altagracia Cornejo NP   gabapentin (NEURONTIN) 100 MG capsule Take 1 capsule (100 mg total) by mouth once daily. 2/3/20 2/2/21 Yes Eden Salmeron NP   lisinopril-hydrochlorothiazide (PRINZIDE,ZESTORETIC) 20-12.5 mg per tablet Take 2 tablets by mouth once daily. 10/25/19  Yes Claudia Rapp MD   magnesium 30 mg Tab Take by mouth once.   Yes Historical Provider, MD   melatonin 10 mg Tab Take 1 tablet (10 mg total) by mouth once daily. 2/3/20  Yes Eden Salmeron NP   metFORMIN (GLUCOPHAGE-XR) 500 MG 24 hr tablet Take 1 tablet (500 mg total) by mouth daily with breakfast. 1/2/20 1/1/21 Yes Claudia Rapp MD   ondansetron (ZOFRAN-ODT) 8 MG TbDL Dissolve 1 tablet by mouth every 6 hours 10/7/19  Yes Gita Valdes MD   pantoprazole (PROTONIX) 40 MG tablet Take 1 tablet (40 mg total) by mouth once daily. 1/6/20  Yes Altagracia Cornejo NP   potassium chloride SA (K-DUR,KLOR-CON) 20 MEQ tablet Take 1 tablet (20 mEq total) by mouth once daily. 2/3/20  Yes Eden Salmeron NP   alirocumab (PRALUENT PEN) 75 mg/mL PnIj Inject 1 mL (75 mg total) into the skin every 14 (fourteen) days. 1/29/20   Alina Bazan MD   aspirin (ECOTRIN) 81 MG EC tablet Take 81 mg by mouth once daily.    Historical Provider, MD   blood sugar diagnostic Strp 1 each by Misc.(Non-Drug; Combo Route) route once daily. 6/29/18   Claudia Rapp MD   blood-glucose meter  kit Use as instructed 6/29/18   Claudia Rapp MD   fish oil-omega-3 fatty acids 300-1,000 mg capsule Take 4 g by mouth nightly.     Historical Provider, MD   lancets (FREESTYLE LANCETS) 28 gauge Misc 1 lancet by Misc.(Non-Drug; Combo Route) route once daily. 6/29/18   Claudia Rapp MD   LIDOCAINE VISCOUS 2 % solution daily as needed.  6/20/19   Historical Provider, MD   magic mouthwash diphen/antac/lidoc Swish and spit 15 mls every 4 (four)  hours as needed. 6/20/19   Gita Valdes MD   TRUE METRIX GLUCOSE METER [facility]  7/13/18   Historical Provider, MD     Anticoagulants/Antiplatelets: no anticoagulation    Allergies:   Review of patient's allergies indicates:   Allergen Reactions    Revlimid [lenalidomide] Swelling     Facial swelling  6/8/17 - in the process of desensitation     Sedation History:  no adverse reactions    Review of Systems:   Hematological: no known coagulopathies  Respiratory: no shortness of breath  Cardiovascular: no chest pain  Gastrointestinal: no abdominal pain  Genito-Urinary: no dysuria  Musculoskeletal: negative  Neurological: no TIA or stroke symptoms         OBJECTIVE:     Vital Signs (Most Recent)  Temp: 98.5 °F (36.9 °C) (02/19/20 1131)  Pulse: 81 (02/19/20 1131)  Resp: 20 (02/19/20 1131)  BP: (!) 188/83 (02/19/20 1131)  SpO2: 99 % (02/19/20 1131)    Physical Exam:  ASA: 2  Mallampati: 2    General: no acute distress  Mental Status: alert and oriented to person, place and time  HEENT: normocephalic, atraumatic  Chest: unlabored breathing  Heart: regular heart rate  Abdomen: nondistended  Extremity: moves all extremities    Laboratory  Lab Results   Component Value Date    INR 1.1 02/19/2020       Lab Results   Component Value Date    WBC 2.28 (L) 02/17/2020    HGB 6.5 (L) 02/17/2020    HCT 20.7 (L) 02/17/2020    MCV 92 02/17/2020    PLT 69 (L) 02/17/2020      Lab Results   Component Value Date     (H) 02/17/2020     02/17/2020    K 3.6 02/17/2020      02/17/2020    CO2 30 (H) 02/17/2020    BUN 18 02/17/2020    CREATININE 1.1 02/17/2020    CALCIUM 9.3 02/17/2020    MG 1.7 03/21/2019    ALT 45 (H) 02/17/2020    AST 26 02/17/2020    ALBUMIN 3.5 02/17/2020    BILITOT 0.5 02/17/2020    BILIDIR 0.2 08/14/2019       ASSESSMENT/PLAN:     Sedation Plan: moderate  Patient will undergo chest port placement.    Rory Hutton M.D.  PGY-II Radiology  Pager: [phone]

## 2020-02-19 NOTE — NURSING
Pt to [facility] post procedure bay #4. Recd bedside report from ALIE Ceja. Pt arrived a/o x4 and denies pain. Pt VSS, procedure site w/o bleeding or hematoma and dsg are CDI. Pt in no acute distress, will continue to monitor.

## 2020-02-19 NOTE — DISCHARGE SUMMARY
Radiology Discharge Summary      Hospital Course: No complications    Admit Date: 2/19/2020  Discharge Date: 02/19/2020     Instructions Given to Patient: Yes  Diet: Resume prior diet  Activity: activity as tolerated and no driving for today    Description of Condition on Discharge: Stable  Vital Signs (Most Recent): Temp: 98.1 °F (36.7 °C) (02/19/20 1400)  Pulse: 84 (02/19/20 1500)  Resp: 17 (02/19/20 1500)  BP: (!) 143/67 (02/19/20 1500)  SpO2: 99 % (02/19/20 1500)    Discharge Disposition: Home    Discharge Diagnosis:     MDS    Port placement     Follow-up:   Per referring physician    Alverto Verduczo MD  Department of Radiology  Pager: [phone]

## 2020-02-19 NOTE — PROCEDURES
Radiology Post-Procedure Note    Pre Op Diagnosis: MDS    Post Op Diagnosis: Same    Procedure: PORT placement    Procedure performed by: Alverto Verduzco MD    Written Informed Consent Obtained: Yes    Specimen Removed: No    Estimated Blood Loss: Minimal    Findings:   Using realtime U/S guidance an 8 Fr port catheter was placed into the right Internal jugular vein with tip of the catheter in the cavoatrial junction.    Port is ready for use.     Alverto Verduzco MD  Department of Radiology  Pager: [phone]

## 2020-02-19 NOTE — NURSING
Pt arrives to [facility] via stretcher. Pt is AAOX4, able to state reason she is here. Orders, hx, labs, consent reviewed. BP elevated, pt asymptomatic. MD alerted.

## 2020-02-19 NOTE — NURSING
Tunneled cath inserted. Pt tolerated well. NAD noted or reported. Site bandaged, CDI. Pt to recover in [facility], report given at the bedside. BP monitoring remains, pt remains asymptomatic. MD alerted on BP, he reports to watch pt in [facility].

## 2020-02-20 ENCOUNTER — PATIENT OUTREACH (OUTPATIENT)
Dept: ADMINISTRATIVE | Facility: HOSPITAL | Age: 70
End: 2020-02-20

## 2020-02-24 ENCOUNTER — LAB VISIT (OUTPATIENT)
Dept: LAB | Facility: HOSPITAL | Age: 70
End: 2020-02-24
Attending: INTERNAL MEDICINE
Payer: MEDICARE

## 2020-02-24 DIAGNOSIS — D46.C MDS (MYELODYSPLASTIC SYNDROME) WITH 5Q DELETION: ICD-10-CM

## 2020-02-24 LAB
ABO + RH BLD: NORMAL
ALBUMIN SERPL BCP-MCNC: 3.8 G/DL (ref 3.5–5.2)
ALP SERPL-CCNC: 69 U/L (ref 55–135)
ALT SERPL W/O P-5'-P-CCNC: 46 U/L (ref 10–44)
ANION GAP SERPL CALC-SCNC: 10 MMOL/L (ref 8–16)
ANISOCYTOSIS BLD QL SMEAR: SLIGHT
AST SERPL-CCNC: 33 U/L (ref 10–40)
BASOPHILS NFR BLD: 1 % (ref 0–1.9)
BILIRUB SERPL-MCNC: 0.5 MG/DL (ref 0.1–1)
BLD GP AB SCN CELLS X3 SERPL QL: NORMAL
BUN SERPL-MCNC: 19 MG/DL (ref 8–23)
CALCIUM SERPL-MCNC: 9.6 MG/DL (ref 8.7–10.5)
CHLORIDE SERPL-SCNC: 103 MMOL/L (ref 95–110)
CO2 SERPL-SCNC: 26 MMOL/L (ref 23–29)
CREAT SERPL-MCNC: 1 MG/DL (ref 0.5–1.4)
DIFFERENTIAL METHOD: ABNORMAL
EOSINOPHIL NFR BLD: 4 % (ref 0–8)
ERYTHROCYTE [DISTWIDTH] IN BLOOD BY AUTOMATED COUNT: 18.6 % (ref 11.5–14.5)
EST. GFR  (AFRICAN AMERICAN): >60 ML/MIN/1.73 M^2
EST. GFR  (NON AFRICAN AMERICAN): 57.6 ML/MIN/1.73 M^2
GLUCOSE SERPL-MCNC: 104 MG/DL (ref 70–110)
HCT VFR BLD AUTO: 24.2 % (ref 37–48.5)
HGB BLD-MCNC: 7.6 G/DL (ref 12–16)
HYPOCHROMIA BLD QL SMEAR: ABNORMAL
IMM GRANULOCYTES # BLD AUTO: ABNORMAL K/UL (ref 0–0.04)
IMM GRANULOCYTES NFR BLD AUTO: ABNORMAL % (ref 0–0.5)
LYMPHOCYTES NFR BLD: 56 % (ref 18–48)
MCH RBC QN AUTO: 27.3 PG (ref 27–31)
MCHC RBC AUTO-ENTMCNC: 31.4 G/DL (ref 32–36)
MCV RBC AUTO: 87 FL (ref 82–98)
MONOCYTES NFR BLD: 1 % (ref 4–15)
NEUTROPHILS NFR BLD: 38 % (ref 38–73)
NRBC BLD-RTO: 0 /100 WBC
OVALOCYTES BLD QL SMEAR: ABNORMAL
PLATELET # BLD AUTO: 167 K/UL (ref 150–350)
PLATELET BLD QL SMEAR: ABNORMAL
PMV BLD AUTO: 12 FL (ref 9.2–12.9)
POIKILOCYTOSIS BLD QL SMEAR: SLIGHT
POLYCHROMASIA BLD QL SMEAR: ABNORMAL
POTASSIUM SERPL-SCNC: 3.8 MMOL/L (ref 3.5–5.1)
PROT SERPL-MCNC: 7.2 G/DL (ref 6–8.4)
RBC # BLD AUTO: 2.78 M/UL (ref 4–5.4)
SODIUM SERPL-SCNC: 139 MMOL/L (ref 136–145)
WBC # BLD AUTO: 1.56 K/UL (ref 3.9–12.7)

## 2020-02-24 PROCEDURE — 86901 BLOOD TYPING SEROLOGIC RH(D): CPT | Mod: HCNC

## 2020-02-24 PROCEDURE — 85007 BL SMEAR W/DIFF WBC COUNT: CPT | Mod: HCNC

## 2020-02-24 PROCEDURE — 36415 COLL VENOUS BLD VENIPUNCTURE: CPT | Mod: HCNC

## 2020-02-24 PROCEDURE — 80053 COMPREHEN METABOLIC PANEL: CPT | Mod: HCNC

## 2020-02-24 PROCEDURE — 85027 COMPLETE CBC AUTOMATED: CPT | Mod: HCNC

## 2020-03-02 ENCOUNTER — LAB VISIT (OUTPATIENT)
Dept: LAB | Facility: HOSPITAL | Age: 70
End: 2020-03-02
Attending: INTERNAL MEDICINE
Payer: MEDICARE

## 2020-03-02 DIAGNOSIS — D46.C MDS (MYELODYSPLASTIC SYNDROME) WITH 5Q DELETION: ICD-10-CM

## 2020-03-02 LAB
ABO + RH BLD: NORMAL
ALBUMIN SERPL BCP-MCNC: 3.9 G/DL (ref 3.5–5.2)
ALP SERPL-CCNC: 65 U/L (ref 55–135)
ALT SERPL W/O P-5'-P-CCNC: 59 U/L (ref 10–44)
ANION GAP SERPL CALC-SCNC: 9 MMOL/L (ref 8–16)
ANISOCYTOSIS BLD QL SMEAR: ABNORMAL
AST SERPL-CCNC: 33 U/L (ref 10–40)
BASOPHILS # BLD AUTO: ABNORMAL K/UL (ref 0–0.2)
BASOPHILS NFR BLD: 0 % (ref 0–1.9)
BILIRUB SERPL-MCNC: 0.3 MG/DL (ref 0.1–1)
BLD GP AB SCN CELLS X3 SERPL QL: NORMAL
BUN SERPL-MCNC: 19 MG/DL (ref 8–23)
CALCIUM SERPL-MCNC: 9.8 MG/DL (ref 8.7–10.5)
CHLORIDE SERPL-SCNC: 103 MMOL/L (ref 95–110)
CO2 SERPL-SCNC: 27 MMOL/L (ref 23–29)
CREAT SERPL-MCNC: 1 MG/DL (ref 0.5–1.4)
DIFFERENTIAL METHOD: ABNORMAL
EOSINOPHIL # BLD AUTO: ABNORMAL K/UL (ref 0–0.5)
EOSINOPHIL NFR BLD: 16 % (ref 0–8)
ERYTHROCYTE [DISTWIDTH] IN BLOOD BY AUTOMATED COUNT: 19.3 % (ref 11.5–14.5)
EST. GFR  (AFRICAN AMERICAN): >60 ML/MIN/1.73 M^2
EST. GFR  (NON AFRICAN AMERICAN): 57.6 ML/MIN/1.73 M^2
GIANT PLATELETS BLD QL SMEAR: PRESENT
GLUCOSE SERPL-MCNC: 108 MG/DL (ref 70–110)
HCT VFR BLD AUTO: 21.1 % (ref 37–48.5)
HGB BLD-MCNC: 6.7 G/DL (ref 12–16)
HYPOCHROMIA BLD QL SMEAR: ABNORMAL
IMM GRANULOCYTES # BLD AUTO: ABNORMAL K/UL (ref 0–0.04)
IMM GRANULOCYTES NFR BLD AUTO: ABNORMAL % (ref 0–0.5)
LYMPHOCYTES # BLD AUTO: ABNORMAL K/UL (ref 1–4.8)
LYMPHOCYTES NFR BLD: 46 % (ref 18–48)
MCH RBC QN AUTO: 28.2 PG (ref 27–31)
MCHC RBC AUTO-ENTMCNC: 31.8 G/DL (ref 32–36)
MCV RBC AUTO: 89 FL (ref 82–98)
MONOCYTES # BLD AUTO: ABNORMAL K/UL (ref 0.3–1)
MONOCYTES NFR BLD: 6 % (ref 4–15)
NEUTROPHILS NFR BLD: 32 % (ref 38–73)
NRBC BLD-RTO: 0 /100 WBC
PLATELET # BLD AUTO: 288 K/UL (ref 150–350)
PLATELET BLD QL SMEAR: ABNORMAL
PMV BLD AUTO: 10.7 FL (ref 9.2–12.9)
POLYCHROMASIA BLD QL SMEAR: ABNORMAL
POTASSIUM SERPL-SCNC: 3.6 MMOL/L (ref 3.5–5.1)
PROT SERPL-MCNC: 7.1 G/DL (ref 6–8.4)
RBC # BLD AUTO: 2.38 M/UL (ref 4–5.4)
SODIUM SERPL-SCNC: 139 MMOL/L (ref 136–145)
WBC # BLD AUTO: 1.47 K/UL (ref 3.9–12.7)

## 2020-03-02 PROCEDURE — 85027 COMPLETE CBC AUTOMATED: CPT | Mod: HCNC

## 2020-03-02 PROCEDURE — 36415 COLL VENOUS BLD VENIPUNCTURE: CPT | Mod: HCNC

## 2020-03-02 PROCEDURE — 86850 RBC ANTIBODY SCREEN: CPT | Mod: HCNC

## 2020-03-02 PROCEDURE — 80053 COMPREHEN METABOLIC PANEL: CPT | Mod: HCNC

## 2020-03-02 PROCEDURE — 85007 BL SMEAR W/DIFF WBC COUNT: CPT | Mod: HCNC

## 2020-03-03 ENCOUNTER — OFFICE VISIT (OUTPATIENT)
Dept: HEMATOLOGY/ONCOLOGY | Facility: CLINIC | Age: 70
End: 2020-03-03
Payer: MEDICARE

## 2020-03-03 ENCOUNTER — INFUSION (OUTPATIENT)
Dept: INFUSION THERAPY | Facility: HOSPITAL | Age: 70
End: 2020-03-03
Attending: OPTOMETRIST
Payer: MEDICARE

## 2020-03-03 VITALS
SYSTOLIC BLOOD PRESSURE: 139 MMHG | HEIGHT: 65 IN | WEIGHT: 175.69 LBS | BODY MASS INDEX: 29.27 KG/M2 | OXYGEN SATURATION: 97 % | TEMPERATURE: 99 F | DIASTOLIC BLOOD PRESSURE: 61 MMHG | HEART RATE: 88 BPM | RESPIRATION RATE: 18 BRPM

## 2020-03-03 VITALS
SYSTOLIC BLOOD PRESSURE: 170 MMHG | RESPIRATION RATE: 18 BRPM | HEART RATE: 82 BPM | TEMPERATURE: 98 F | DIASTOLIC BLOOD PRESSURE: 67 MMHG

## 2020-03-03 DIAGNOSIS — D64.9 ANEMIA REQUIRING TRANSFUSIONS: ICD-10-CM

## 2020-03-03 DIAGNOSIS — D64.9 ANEMIA REQUIRING TRANSFUSIONS: Primary | ICD-10-CM

## 2020-03-03 DIAGNOSIS — F32.A DEPRESSION, UNSPECIFIED DEPRESSION TYPE: ICD-10-CM

## 2020-03-03 DIAGNOSIS — D46.C MDS (MYELODYSPLASTIC SYNDROME) WITH 5Q DELETION: Primary | ICD-10-CM

## 2020-03-03 PROCEDURE — 1101F PR PT FALLS ASSESS DOC 0-1 FALLS W/OUT INJ PAST YR: ICD-10-PCS | Mod: HCNC,CPTII,S$GLB, | Performed by: INTERNAL MEDICINE

## 2020-03-03 PROCEDURE — P9038 RBC IRRADIATED: HCPCS

## 2020-03-03 PROCEDURE — 1126F AMNT PAIN NOTED NONE PRSNT: CPT | Mod: HCNC,S$GLB,, | Performed by: INTERNAL MEDICINE

## 2020-03-03 PROCEDURE — 99215 OFFICE O/P EST HI 40 MIN: CPT | Mod: HCNC,S$GLB,, | Performed by: INTERNAL MEDICINE

## 2020-03-03 PROCEDURE — 3078F DIAST BP <80 MM HG: CPT | Mod: HCNC,CPTII,S$GLB, | Performed by: INTERNAL MEDICINE

## 2020-03-03 PROCEDURE — 36430 TRANSFUSION BLD/BLD COMPNT: CPT | Mod: HCNC

## 2020-03-03 PROCEDURE — 1159F MED LIST DOCD IN RCRD: CPT | Mod: HCNC,S$GLB,, | Performed by: INTERNAL MEDICINE

## 2020-03-03 PROCEDURE — 99999 PR PBB SHADOW E&M-EST. PATIENT-LVL III: CPT | Mod: PBBFAC,HCNC,, | Performed by: INTERNAL MEDICINE

## 2020-03-03 PROCEDURE — 63600175 PHARM REV CODE 636 W HCPCS: Mod: JG,HCNC | Performed by: INTERNAL MEDICINE

## 2020-03-03 PROCEDURE — 86920 COMPATIBILITY TEST SPIN: CPT

## 2020-03-03 PROCEDURE — 3075F SYST BP GE 130 - 139MM HG: CPT | Mod: HCNC,CPTII,S$GLB, | Performed by: INTERNAL MEDICINE

## 2020-03-03 PROCEDURE — 1159F PR MEDICATION LIST DOCUMENTED IN MEDICAL RECORD: ICD-10-PCS | Mod: HCNC,S$GLB,, | Performed by: INTERNAL MEDICINE

## 2020-03-03 PROCEDURE — 3075F PR MOST RECENT SYSTOLIC BLOOD PRESS GE 130-139MM HG: ICD-10-PCS | Mod: HCNC,CPTII,S$GLB, | Performed by: INTERNAL MEDICINE

## 2020-03-03 PROCEDURE — 99215 PR OFFICE/OUTPT VISIT, EST, LEVL V, 40-54 MIN: ICD-10-PCS | Mod: HCNC,S$GLB,, | Performed by: INTERNAL MEDICINE

## 2020-03-03 PROCEDURE — 1101F PT FALLS ASSESS-DOCD LE1/YR: CPT | Mod: HCNC,CPTII,S$GLB, | Performed by: INTERNAL MEDICINE

## 2020-03-03 PROCEDURE — 1126F PR PAIN SEVERITY QUANTIFIED, NO PAIN PRESENT: ICD-10-PCS | Mod: HCNC,S$GLB,, | Performed by: INTERNAL MEDICINE

## 2020-03-03 PROCEDURE — 63600175 PHARM REV CODE 636 W HCPCS: Mod: HCNC | Performed by: NURSE PRACTITIONER

## 2020-03-03 PROCEDURE — 99999 PR PBB SHADOW E&M-EST. PATIENT-LVL III: ICD-10-PCS | Mod: PBBFAC,HCNC,, | Performed by: INTERNAL MEDICINE

## 2020-03-03 PROCEDURE — 96401 CHEMO ANTI-NEOPL SQ/IM: CPT | Mod: HCNC

## 2020-03-03 PROCEDURE — 27201040 HC RC 50 FILTER

## 2020-03-03 PROCEDURE — 25000003 PHARM REV CODE 250: Mod: HCNC | Performed by: NURSE PRACTITIONER

## 2020-03-03 PROCEDURE — 3078F PR MOST RECENT DIASTOLIC BLOOD PRESSURE < 80 MM HG: ICD-10-PCS | Mod: HCNC,CPTII,S$GLB, | Performed by: INTERNAL MEDICINE

## 2020-03-03 RX ORDER — ONDANSETRON 2 MG/ML
8 INJECTION INTRAMUSCULAR; INTRAVENOUS
Status: CANCELLED | OUTPATIENT
Start: 2020-03-03

## 2020-03-03 RX ORDER — AZACITIDINE 100 MG/1
75 INJECTION, POWDER, LYOPHILIZED, FOR SOLUTION INTRAVENOUS; SUBCUTANEOUS
Status: CANCELLED | OUTPATIENT
Start: 2020-03-03

## 2020-03-03 RX ORDER — ONDANSETRON 2 MG/ML
8 INJECTION INTRAMUSCULAR; INTRAVENOUS
Status: CANCELLED | OUTPATIENT
Start: 2020-03-31

## 2020-03-03 RX ORDER — CITALOPRAM 20 MG/1
20 TABLET, FILM COATED ORAL DAILY
Qty: 30 TABLET | Refills: 2 | Status: CANCELLED | OUTPATIENT
Start: 2020-03-03

## 2020-03-03 RX ORDER — AZACITIDINE 100 MG/1
75 INJECTION, POWDER, LYOPHILIZED, FOR SOLUTION INTRAVENOUS; SUBCUTANEOUS
Status: CANCELLED | OUTPATIENT
Start: 2020-03-06

## 2020-03-03 RX ORDER — AZACITIDINE 100 MG/1
75 INJECTION, POWDER, LYOPHILIZED, FOR SOLUTION INTRAVENOUS; SUBCUTANEOUS
Status: CANCELLED | OUTPATIENT
Start: 2020-04-01

## 2020-03-03 RX ORDER — DIPHENHYDRAMINE HCL 25 MG
25 CAPSULE ORAL
Status: COMPLETED | OUTPATIENT
Start: 2020-03-03 | End: 2020-03-03

## 2020-03-03 RX ORDER — AZACITIDINE 100 MG/1
75 INJECTION, POWDER, LYOPHILIZED, FOR SOLUTION INTRAVENOUS; SUBCUTANEOUS
Status: COMPLETED | OUTPATIENT
Start: 2020-03-03 | End: 2020-03-03

## 2020-03-03 RX ORDER — ONDANSETRON 2 MG/ML
8 INJECTION INTRAMUSCULAR; INTRAVENOUS
Status: CANCELLED | OUTPATIENT
Start: 2020-04-03

## 2020-03-03 RX ORDER — HYDROCODONE BITARTRATE AND ACETAMINOPHEN 500; 5 MG/1; MG/1
TABLET ORAL ONCE
Status: CANCELLED | OUTPATIENT
Start: 2020-03-03 | End: 2020-03-03

## 2020-03-03 RX ORDER — AZACITIDINE 100 MG/1
75 INJECTION, POWDER, LYOPHILIZED, FOR SOLUTION INTRAVENOUS; SUBCUTANEOUS
Status: CANCELLED | OUTPATIENT
Start: 2020-03-04

## 2020-03-03 RX ORDER — AZACITIDINE 100 MG/1
75 INJECTION, POWDER, LYOPHILIZED, FOR SOLUTION INTRAVENOUS; SUBCUTANEOUS
Status: CANCELLED | OUTPATIENT
Start: 2020-03-31

## 2020-03-03 RX ORDER — ONDANSETRON 2 MG/ML
8 INJECTION INTRAMUSCULAR; INTRAVENOUS
Status: CANCELLED | OUTPATIENT
Start: 2020-04-04

## 2020-03-03 RX ORDER — AZACITIDINE 100 MG/1
75 INJECTION, POWDER, LYOPHILIZED, FOR SOLUTION INTRAVENOUS; SUBCUTANEOUS
Status: CANCELLED | OUTPATIENT
Start: 2020-04-03

## 2020-03-03 RX ORDER — AZACITIDINE 100 MG/1
75 INJECTION, POWDER, LYOPHILIZED, FOR SOLUTION INTRAVENOUS; SUBCUTANEOUS
Status: CANCELLED | OUTPATIENT
Start: 2020-04-02

## 2020-03-03 RX ORDER — AZACITIDINE 100 MG/1
75 INJECTION, POWDER, LYOPHILIZED, FOR SOLUTION INTRAVENOUS; SUBCUTANEOUS
Status: CANCELLED | OUTPATIENT
Start: 2020-03-05

## 2020-03-03 RX ORDER — ONDANSETRON 2 MG/ML
8 INJECTION INTRAMUSCULAR; INTRAVENOUS
Status: CANCELLED | OUTPATIENT
Start: 2020-03-06

## 2020-03-03 RX ORDER — ONDANSETRON 2 MG/ML
8 INJECTION INTRAMUSCULAR; INTRAVENOUS
Status: CANCELLED
Start: 2020-03-05

## 2020-03-03 RX ORDER — HYDROCODONE BITARTRATE AND ACETAMINOPHEN 500; 5 MG/1; MG/1
TABLET ORAL ONCE
Status: COMPLETED | OUTPATIENT
Start: 2020-03-03 | End: 2020-03-03

## 2020-03-03 RX ORDER — ONDANSETRON 2 MG/ML
8 INJECTION INTRAMUSCULAR; INTRAVENOUS
Status: CANCELLED | OUTPATIENT
Start: 2020-03-04

## 2020-03-03 RX ORDER — ACETAMINOPHEN 325 MG/1
650 TABLET ORAL
Status: COMPLETED | OUTPATIENT
Start: 2020-03-03 | End: 2020-03-03

## 2020-03-03 RX ORDER — AZACITIDINE 100 MG/1
75 INJECTION, POWDER, LYOPHILIZED, FOR SOLUTION INTRAVENOUS; SUBCUTANEOUS
Status: CANCELLED | OUTPATIENT
Start: 2020-03-07

## 2020-03-03 RX ORDER — DIPHENHYDRAMINE HCL 25 MG
25 CAPSULE ORAL
Status: CANCELLED | OUTPATIENT
Start: 2020-03-03

## 2020-03-03 RX ORDER — ONDANSETRON 2 MG/ML
8 INJECTION INTRAMUSCULAR; INTRAVENOUS
Status: CANCELLED
Start: 2020-04-02

## 2020-03-03 RX ORDER — AZACITIDINE 100 MG/1
75 INJECTION, POWDER, LYOPHILIZED, FOR SOLUTION INTRAVENOUS; SUBCUTANEOUS
Status: CANCELLED | OUTPATIENT
Start: 2020-04-04

## 2020-03-03 RX ORDER — ONDANSETRON 2 MG/ML
8 INJECTION INTRAMUSCULAR; INTRAVENOUS
Status: CANCELLED | OUTPATIENT
Start: 2020-04-01

## 2020-03-03 RX ORDER — CLONIDINE HYDROCHLORIDE 0.2 MG/1
0.1 TABLET ORAL
COMMUNITY
End: 2020-03-05

## 2020-03-03 RX ORDER — ACETAMINOPHEN 325 MG/1
650 TABLET ORAL
Status: CANCELLED | OUTPATIENT
Start: 2020-03-03

## 2020-03-03 RX ORDER — ONDANSETRON 2 MG/ML
8 INJECTION INTRAMUSCULAR; INTRAVENOUS
Status: CANCELLED | OUTPATIENT
Start: 2020-03-07

## 2020-03-03 RX ADMIN — DIPHENHYDRAMINE HYDROCHLORIDE 25 MG: 25 CAPSULE ORAL at 10:03

## 2020-03-03 RX ADMIN — AZACITIDINE 150 MG: 100 INJECTION, POWDER, LYOPHILIZED, FOR SOLUTION INTRAVENOUS; SUBCUTANEOUS at 11:03

## 2020-03-03 RX ADMIN — ACETAMINOPHEN 650 MG: 325 TABLET ORAL at 10:03

## 2020-03-03 RX ADMIN — SODIUM CHLORIDE: 0.9 INJECTION, SOLUTION INTRAVENOUS at 10:03

## 2020-03-03 NOTE — Clinical Note
- Please schedule CBC and type and screen weekly on Mondays for possible transfusions through month of March- Schedule return visit with CBC, type and screen, CMP and appt with Dr. Valdes or NP 3/31/2020- Schedule chemo chair for cycle 12 vidaza 3/31/20 through 4/4/20- marrow with sedation in April after Daniela

## 2020-03-03 NOTE — PLAN OF CARE
Problem: Adult Inpatient Plan of Care  Goal: Optimal Comfort and Wellbeing  Intervention: Provide Person-Centered Care  Flowsheets (Taken 3/3/2020 1211)  Trust Relationship/Rapport: thoughts/feelings acknowledged; care explained; choices provided; emotional support provided; empathic listening provided; questions answered; questions encouraged; reassurance provided

## 2020-03-03 NOTE — PROGRESS NOTES
Subjective:       Patient ID: Susan Puente is a 69 y.o. female.    Chief Complaint: No chief complaint on file.    HPI: Patient presents today alone for follow up of her history of MDS 5 q del syndrome prior to cycle 11 Vidaza as third line therapy for 5q minus syndrome. She continues to have disease per recent BM bx 10/2019. Continues with chronic fatigue (although improving since starting to exercise 3x a week) and leg pain. Leg pain has improved with gabapentin. Notes she is finally having hair loss. She denies fevers, chills, sob, chest pain, n/v/c.     Oncology History   Ms. Puente is a 68 year old female with hx of DM2, peripheral vascular disease, tobacco use, CAD, hyperlipidemia, hypertension who was hospitalized 11/10/16 for anemia. hgb 5.3  MCH 43.4 with normal WBC and platelets. Patient had normal iron stores. She was transfused 3 units of PRBCs and discharged home with hgb 8.7 on 11/11/16. She was referred for further evalutation of her anemia. On 12/16/16 patient had a normal SPEP and immunofixation. Slightly elevated kappa light chains with normal ration. Elevated vitamin b12, normal folate, JAYDEN was positive with a low titer and negative profile. Patient had a bone marrow biopsy 1/5/16 which showed the core biopsy is normocellular for age (40%); however, megakaryocytes are increased and show frequent small, hypolobated forms. Additionally, a subset of the neutrophils are hypogranular. Blasts are not increased by either morphology (1.2%) or in the corresponding flow cytometric analysis. Fish detects a 5q deletion in 54.5% of nuclei. Cytogenetics reported 20 metaphases, 2 metaphases were normal and 18 metaphases had a 5q deletion. No additional cytogenetic abnormalities were detected. Findings consistent with 5q deletion syndrome.     Patient had a delay in obtaining Revlimid 10 mg daily but did start taking the medication 2/8/17. On 2/9/17 patient developed a diffuse maculopapular rash  throughout scalp arms, legs and torso. She states she had no stridor or wheezing. She discontinued medication 2/15/17. Went to allergist who provided references on desensitization so that patient could resume Revlimid. Unfortunately developed pancytopenia and Revlimid stopped 6/16/17. She had a repeat BMBX  performed 6/8/17 and showed a hypercellular marrow (60%) continued atypia in the granulocytes and megakaryocytes were noted.  Additionally, there is erythroid atypia present. No increase in blasts. Cytogenetics are normal and MDS FISH is negative, failing to show 5 q minus. NGS should no significant molecular mutations.  Anemia work up revealed bienvenido negative hemolysis.    Revlimid has been stopped since June 2017 after she developed pancytopenia while on Revlimid (required desensitization). CBC was normal for almost 1 year and marrow was negative for del5q. Bone marrow biopsy repeat from 6/2018 showed relapsed 5q minus MDS without new or additional mutations. Revlimid restarted at 2.5 mg daily with prednisone to prevent allergic reaction. Titrated to a goal of 10mg daily Revlimid. Hospital admission 3/12-3/17/19 for unresponsive event after blood transfusion, found to be profoundly pancytopenic at admission. Since hospital admission Revlimid has been stopped. Repeat marrow March 2019 shows persistent MDS with 5q minus.     Started cycle 1 Vidaza 5/6/19 subq for 5 days every 28 days. Restaging bone marrow biopsy from 10/24/19 shows persistent MDS, no excess blasts and 3 of 20 metaphases with 5q deletion.    Review of Systems   Constitutional: Negative for fever, chills, weight loss, fatigue.   HENT: Negative for sinus congestion or rhinorrhea. Negative for ear pain, mouth sores, nosebleeds and trouble swallowing.    Respiratory: Negative for cough, shortness of breath and wheezing.    Cardiovascular: Negative for chest pain and leg swelling.   Gastrointestinal: +diarrhea; Negative for abdominal distention,  abdominal pain, blood in stool, nausea and vomiting.   Endocrine: Negative for polyphagia and polyuria.   Genitourinary: Negative for dysuria, hematuria and urgency.   Musculoskeletal: Positive for sciatic pain much improved.   Skin: Negative for color change, pallor and rash.   Neurological: Negative for dizziness, weakness, light-headedness and headaches.   Hematological: Negative for adenopathy. Does not bruise/bleed easily.   Psychiatric/Behavioral: Negative for agitation and behavioral problems.       Objective:      Physical Exam   Constitutional: She is oriented to person, place, and time. She appears well-developed and well-nourished.   Mouth/Throat: Oropharynx is clear and moist. No oropharyngeal exudate.   Eyes: Conjunctivae and EOM are normal. Right eye exhibits no discharge. Left eye exhibits no discharge.   Neck: Normal range of motion. Neck supple.   Cardiovascular: Normal rate, regular rhythm, normal heart sounds and intact distal pulses.   No murmur heard.  Pulmonary/Chest: Effort normal and breath sounds normal. No respiratory distress. She has no wheezes.   Abdominal: Soft. Bowel sounds are normal. She exhibits no distension and no mass. There is no tenderness.   Musculoskeletal: Normal range of motion. She exhibits no edema.   Neurological: She is alert and oriented to person, place, and time.   Skin: Skin is warm and dry. No rash noted.   Psychiatric: She has a normal mood and affect. Her behavior is normal. Judgment and thought content normal.   Nursing note and vitals reviewed.      Assessment:       No diagnosis found.    Plan:     MDS  - Initially with 5 q minus syndrome and treated with Revlimid. Developed allergy and was then desensitized. Soon after developed pancytopenia and Revlimid held since June 2017.    - BMBX on 6/8/17 was with normal cytogenetics. 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 Discussed treatment options of HMA therapy or repeat trial of Revlimid and patient  wished to proceed with Revlimid   - Revlimid stopped again due to repeat allergic reaction and pancytopenia 3/2019  - Started cycle 1 Vidaza 5/6/19 subq for 5 days every 28 days  - Restaging bone marrow biopsy following cycle 5 from 10/24/19 shows persistent MDS, no excess blasts and 3 of 20 metaphases with 5q deletion  - Tolerated cycles 1-9 well thus far. Package insert for Vidaza recommends holding if ANC <1000, however pt has been receiving this with an ANC below 1000 for each cycle. Per Dr. Valdes, beverly to continue to give despite neutropenia. Will proceed with C11 today  -Plan to repeat marrow biopsy in April 2020; with sedation after Daniela    Cytopenias 2/2 disease: anemia and leukopenia  - Transfuse for hgb < 7 or plts < 10K  - Continue ppx cipro, acyclovir, and fluconazole    DM2  - management per PCP  - continue metformin    HTN  - management per PCP  - continue lisinopril-HCTZ    CAD  - continue praluent for HLD per PCP (intolerant to statins)  - continue ASA    Adjustment disorder  - Improved mood on Celexa. Continue    Myalgias/possible sciatica  - started trial of gabapentin 100 mg bid 11/4/19, much improved; now only taking gabapentin once a day in AM  - patient has returned to [facility] [place] and is now doing silver sneakers with a friend; she is very excited about this (3x per week)    Insonmia  - using melatonin OTC    Follow up:  - Please schedule CBC and type and screen weekly on Mondays for possible transfusions through month of March  - Schedule return visit with CBC, type and screen, CMP and appt with Dr. Valdes or NP 3/31/2020  - Schedule chemo chair for cycle 12 vidaza 3/31/20 through 4/4/20  - marrow with sedation in April after Daniela

## 2020-03-03 NOTE — TELEPHONE ENCOUNTER
----- Message from Alia Hendricks sent at 3/3/2020 10:32 AM CST -----  Contact: Patient  Refill or New Rx: Refill    RX Name and Strength: citalopram (CELEXA) 20 MG tablet    Preferred Pharmacy with phone number:  [facility]   [phone] (Phone)  [phone] (Fax)    Ordering Provider: Bran Lau Call Back Number: [phone]    Additional Information:

## 2020-03-03 NOTE — PLAN OF CARE
Patient tolerated 1 unit of prbcs well today and vidaza injection x3 to abdomen well today. NAD noted upon discharge. Port + blood return present, flushed, hep locked and deaccessed. Verbalized understanding to call MD for any questions/concerns. Discharged home, ambulated independently using walker.

## 2020-03-04 ENCOUNTER — INFUSION (OUTPATIENT)
Dept: INFUSION THERAPY | Facility: HOSPITAL | Age: 70
End: 2020-03-04
Attending: OPTOMETRIST
Payer: MEDICARE

## 2020-03-04 VITALS — SYSTOLIC BLOOD PRESSURE: 172 MMHG | DIASTOLIC BLOOD PRESSURE: 74 MMHG | HEART RATE: 87 BPM

## 2020-03-04 DIAGNOSIS — D46.C MDS (MYELODYSPLASTIC SYNDROME) WITH 5Q DELETION: Primary | ICD-10-CM

## 2020-03-04 DIAGNOSIS — F32.A DEPRESSION, UNSPECIFIED DEPRESSION TYPE: ICD-10-CM

## 2020-03-04 PROCEDURE — 63600175 PHARM REV CODE 636 W HCPCS: Mod: JG,HCNC | Performed by: INTERNAL MEDICINE

## 2020-03-04 PROCEDURE — 96401 CHEMO ANTI-NEOPL SQ/IM: CPT | Mod: HCNC

## 2020-03-04 RX ORDER — AZACITIDINE 100 MG/1
75 INJECTION, POWDER, LYOPHILIZED, FOR SOLUTION INTRAVENOUS; SUBCUTANEOUS
Status: COMPLETED | OUTPATIENT
Start: 2020-03-04 | End: 2020-03-04

## 2020-03-04 RX ADMIN — AZACITIDINE 150 MG: 100 INJECTION, POWDER, LYOPHILIZED, FOR SOLUTION INTRAVENOUS; SUBCUTANEOUS at 10:03

## 2020-03-05 ENCOUNTER — INFUSION (OUTPATIENT)
Dept: INFUSION THERAPY | Facility: HOSPITAL | Age: 70
End: 2020-03-05
Attending: OPTOMETRIST
Payer: MEDICARE

## 2020-03-05 ENCOUNTER — OFFICE VISIT (OUTPATIENT)
Dept: INTERNAL MEDICINE | Facility: CLINIC | Age: 70
End: 2020-03-05
Payer: MEDICARE

## 2020-03-05 VITALS
HEIGHT: 65 IN | DIASTOLIC BLOOD PRESSURE: 70 MMHG | BODY MASS INDEX: 28.99 KG/M2 | WEIGHT: 174 LBS | SYSTOLIC BLOOD PRESSURE: 128 MMHG

## 2020-03-05 VITALS — HEART RATE: 91 BPM | DIASTOLIC BLOOD PRESSURE: 67 MMHG | SYSTOLIC BLOOD PRESSURE: 146 MMHG | RESPIRATION RATE: 18 BRPM

## 2020-03-05 DIAGNOSIS — D64.9 ANEMIA REQUIRING TRANSFUSIONS: ICD-10-CM

## 2020-03-05 DIAGNOSIS — D46.9 MDS (MYELODYSPLASTIC SYNDROME): ICD-10-CM

## 2020-03-05 DIAGNOSIS — Z12.31 ENCOUNTER FOR SCREENING MAMMOGRAM FOR BREAST CANCER: ICD-10-CM

## 2020-03-05 DIAGNOSIS — I25.10 CORONARY ARTERY DISEASE, ANGINA PRESENCE UNSPECIFIED, UNSPECIFIED VESSEL OR LESION TYPE, UNSPECIFIED WHETHER NATIVE OR TRANSPLANTED HEART: ICD-10-CM

## 2020-03-05 DIAGNOSIS — D46.C MDS (MYELODYSPLASTIC SYNDROME) WITH 5Q DELETION: Primary | ICD-10-CM

## 2020-03-05 DIAGNOSIS — E11.9 CONTROLLED TYPE 2 DIABETES MELLITUS WITHOUT COMPLICATION, WITHOUT LONG-TERM CURRENT USE OF INSULIN: ICD-10-CM

## 2020-03-05 DIAGNOSIS — N18.30 CONTROLLED TYPE 2 DIABETES MELLITUS WITH STAGE 3 CHRONIC KIDNEY DISEASE, WITHOUT LONG-TERM CURRENT USE OF INSULIN: Primary | ICD-10-CM

## 2020-03-05 DIAGNOSIS — E11.22 CONTROLLED TYPE 2 DIABETES MELLITUS WITH STAGE 3 CHRONIC KIDNEY DISEASE, WITHOUT LONG-TERM CURRENT USE OF INSULIN: Primary | ICD-10-CM

## 2020-03-05 DIAGNOSIS — N18.30 STAGE 3 CHRONIC KIDNEY DISEASE: ICD-10-CM

## 2020-03-05 DIAGNOSIS — E87.6 HYPOKALEMIA: ICD-10-CM

## 2020-03-05 DIAGNOSIS — I10 ESSENTIAL HYPERTENSION: ICD-10-CM

## 2020-03-05 PROCEDURE — 1159F MED LIST DOCD IN RCRD: CPT | Mod: HCNC,S$GLB,, | Performed by: INTERNAL MEDICINE

## 2020-03-05 PROCEDURE — 99999 PR PBB SHADOW E&M-EST. PATIENT-LVL III: CPT | Mod: PBBFAC,HCNC,, | Performed by: INTERNAL MEDICINE

## 2020-03-05 PROCEDURE — 63600175 PHARM REV CODE 636 W HCPCS: Mod: JG,HCNC | Performed by: INTERNAL MEDICINE

## 2020-03-05 PROCEDURE — 99214 PR OFFICE/OUTPT VISIT, EST, LEVL IV, 30-39 MIN: ICD-10-PCS | Mod: HCNC,S$GLB,, | Performed by: INTERNAL MEDICINE

## 2020-03-05 PROCEDURE — 3078F PR MOST RECENT DIASTOLIC BLOOD PRESSURE < 80 MM HG: ICD-10-PCS | Mod: HCNC,CPTII,S$GLB, | Performed by: INTERNAL MEDICINE

## 2020-03-05 PROCEDURE — 3074F SYST BP LT 130 MM HG: CPT | Mod: HCNC,CPTII,S$GLB, | Performed by: INTERNAL MEDICINE

## 2020-03-05 PROCEDURE — 3074F PR MOST RECENT SYSTOLIC BLOOD PRESSURE < 130 MM HG: ICD-10-PCS | Mod: HCNC,CPTII,S$GLB, | Performed by: INTERNAL MEDICINE

## 2020-03-05 PROCEDURE — 99214 OFFICE O/P EST MOD 30 MIN: CPT | Mod: HCNC,S$GLB,, | Performed by: INTERNAL MEDICINE

## 2020-03-05 PROCEDURE — 99499 UNLISTED E&M SERVICE: CPT | Mod: HCNC,S$GLB,, | Performed by: INTERNAL MEDICINE

## 2020-03-05 PROCEDURE — 3078F DIAST BP <80 MM HG: CPT | Mod: HCNC,CPTII,S$GLB, | Performed by: INTERNAL MEDICINE

## 2020-03-05 PROCEDURE — 96401 CHEMO ANTI-NEOPL SQ/IM: CPT | Mod: HCNC

## 2020-03-05 PROCEDURE — 1101F PR PT FALLS ASSESS DOC 0-1 FALLS W/OUT INJ PAST YR: ICD-10-PCS | Mod: HCNC,CPTII,S$GLB, | Performed by: INTERNAL MEDICINE

## 2020-03-05 PROCEDURE — 99999 PR PBB SHADOW E&M-EST. PATIENT-LVL III: ICD-10-PCS | Mod: PBBFAC,HCNC,, | Performed by: INTERNAL MEDICINE

## 2020-03-05 PROCEDURE — 1101F PT FALLS ASSESS-DOCD LE1/YR: CPT | Mod: HCNC,CPTII,S$GLB, | Performed by: INTERNAL MEDICINE

## 2020-03-05 PROCEDURE — 1159F PR MEDICATION LIST DOCUMENTED IN MEDICAL RECORD: ICD-10-PCS | Mod: HCNC,S$GLB,, | Performed by: INTERNAL MEDICINE

## 2020-03-05 PROCEDURE — 99499 RISK ADDL DX/OHS AUDIT: ICD-10-PCS | Mod: HCNC,S$GLB,, | Performed by: INTERNAL MEDICINE

## 2020-03-05 RX ORDER — BLOOD-GLUCOSE CONTROL, NORMAL
EACH MISCELLANEOUS
Qty: 100 EACH | Refills: 3 | Status: SHIPPED | OUTPATIENT
Start: 2020-03-05

## 2020-03-05 RX ORDER — ONDANSETRON 2 MG/ML
8 INJECTION INTRAMUSCULAR; INTRAVENOUS
Status: DISCONTINUED | OUTPATIENT
Start: 2020-03-05 | End: 2020-03-05 | Stop reason: HOSPADM

## 2020-03-05 RX ORDER — AZACITIDINE 100 MG/1
75 INJECTION, POWDER, LYOPHILIZED, FOR SOLUTION INTRAVENOUS; SUBCUTANEOUS
Status: COMPLETED | OUTPATIENT
Start: 2020-03-05 | End: 2020-03-05

## 2020-03-05 RX ORDER — METFORMIN HYDROCHLORIDE 500 MG/1
500 TABLET, EXTENDED RELEASE ORAL
Qty: 90 TABLET | Refills: 3 | Status: SHIPPED | OUTPATIENT
Start: 2020-03-05 | End: 2021-04-01 | Stop reason: SDUPTHER

## 2020-03-05 RX ORDER — DEXTROSE 4 G
TABLET,CHEWABLE ORAL
Qty: 1 EACH | Refills: 0 | Status: SHIPPED | OUTPATIENT
Start: 2020-03-05 | End: 2021-02-10

## 2020-03-05 RX ADMIN — AZACITIDINE 150 MG: 100 INJECTION, POWDER, LYOPHILIZED, FOR SOLUTION INTRAVENOUS; SUBCUTANEOUS at 11:03

## 2020-03-05 NOTE — PROGRESS NOTES
"Subjective:       Patient ID: Susan Puente is a 69 y.o. female.    Chief Complaint: Annual Exam (yearly physical. has not been seen in about or over 2 years. cancer patient. ) and Diabetes (currently takes Metformin, says she has been monitoring her diabetes closely. patient gets lab work done weekly for CBC & hemoglobin levels.)    HPI   Susan Puente is a 69 y.o. female here for routine follow up of the following chronic issues. She last saw me in 2017.    HTN, CAD, HLD  praluent  asa  Lisinopril-hctz    CKD3    MDS    DM2 - last a1c was 5.3%  Metformin 500 ac bf  Not testing because she keeps getting error messages. Thinks it is a issue with the strips.     She lives in a townhouse with stairs. She cooks and cleans and has assistance from neighbors when needed. Her  is helping her organizing her bills. She would benefit from HH aide to help with light housekeeping and IADLs. Her bedroom is on second floor. Currently a neighbor is cleaning for her as a [place] present.   Review of Systems   Constitutional: Negative for fever.   Respiratory: Negative for shortness of breath.    Cardiovascular: Negative for chest pain.   Musculoskeletal: Negative.    Skin: Negative.        Objective:   /70 (BP Location: Left arm, Patient Position: Sitting, BP Method: Medium (Manual))   Ht 5' 5" (1.651 m)   Wt 78.9 kg (174 lb)   BMI 28.96 kg/m²      Physical Exam   Constitutional: She is oriented to person, place, and time. She appears well-developed and well-nourished. No distress.   HENT:   Head: Normocephalic and atraumatic.   Cardiovascular: Normal rate and regular rhythm.   Pulmonary/Chest: Effort normal. No respiratory distress. She has no wheezes. She has no rales.   Neurological: She is alert and oriented to person, place, and time.   Skin: Skin is warm and dry. She is not diaphoretic.   Psychiatric: She has a normal mood and affect. Her behavior is normal.       Protective Sensation (w/ 10 " gram monofilament):  Right: Intact  Left: Intact    Visual Inspection:  Normal -  Bilateral    Pedal Pulses:   Right: Present  Left: Present    Posterior tibialis:   Right:Present  Left: Present    Assessment:       1. Controlled type 2 diabetes mellitus with stage 3 chronic kidney disease, without long-term current use of insulin    2. Coronary artery disease, angina presence unspecified, unspecified vessel or lesion type, unspecified whether native or transplanted heart    3. Essential hypertension    4. Stage 3 chronic kidney disease    5. MDS (myelodysplastic syndrome)    6. Anemia requiring transfusions    7. Controlled type 2 diabetes mellitus without complication, without long-term current use of insulin    8. Encounter for screening mammogram for breast cancer        Plan:       Susan was seen today for annual exam and diabetes.    Diagnoses and all orders for this visit:    Controlled type 2 diabetes mellitus with stage 3 chronic kidney disease, without long-term current use of insulin  -     Hemoglobin A1c; Future  -     blood-glucose meter Misc; Test blood sugar once daily  -     lancets 30 gauge Misc; Use to test blood sugar daily  -     blood sugar diagnostic Strp; To check blood sugar 1 times daily  -     metFORMIN (GLUCOPHAGE-XR) 500 MG XR 24hr tablet; Take 1 tablet (500 mg total) by mouth daily with breakfast.    Coronary artery disease, angina presence unspecified, unspecified vessel or lesion type, unspecified whether native or transplanted heart  Managed by cardiology    Essential hypertension  At goal    Stage 3 chronic kidney disease  Monitor    MDS (myelodysplastic syndrome)  Anemia requiring transfusions  Fatigue related to this  -     Ambulatory referral/consult to Outpatient Case Management  She would benefit from  aide to help with light housekeeping and IADLs.

## 2020-03-06 ENCOUNTER — OUTPATIENT CASE MANAGEMENT (OUTPATIENT)
Dept: ADMINISTRATIVE | Facility: OTHER | Age: 70
End: 2020-03-06

## 2020-03-06 ENCOUNTER — INFUSION (OUTPATIENT)
Dept: INFUSION THERAPY | Facility: HOSPITAL | Age: 70
End: 2020-03-06
Attending: OPTOMETRIST
Payer: MEDICARE

## 2020-03-06 DIAGNOSIS — D46.C MDS (MYELODYSPLASTIC SYNDROME) WITH 5Q DELETION: Primary | ICD-10-CM

## 2020-03-06 PROCEDURE — 96401 CHEMO ANTI-NEOPL SQ/IM: CPT | Mod: HCNC

## 2020-03-06 PROCEDURE — 63600175 PHARM REV CODE 636 W HCPCS: Mod: JG,HCNC | Performed by: INTERNAL MEDICINE

## 2020-03-06 RX ORDER — AZACITIDINE 100 MG/1
75 INJECTION, POWDER, LYOPHILIZED, FOR SOLUTION INTRAVENOUS; SUBCUTANEOUS
Status: COMPLETED | OUTPATIENT
Start: 2020-03-06 | End: 2020-03-06

## 2020-03-06 RX ORDER — CITALOPRAM 20 MG/1
20 TABLET, FILM COATED ORAL DAILY
Qty: 30 TABLET | Refills: 2 | Status: SHIPPED | OUTPATIENT
Start: 2020-03-06 | End: 2020-07-15 | Stop reason: SDUPTHER

## 2020-03-06 RX ORDER — ONDANSETRON 2 MG/ML
8 INJECTION INTRAMUSCULAR; INTRAVENOUS
Status: DISCONTINUED | OUTPATIENT
Start: 2020-03-06 | End: 2020-03-06 | Stop reason: HOSPADM

## 2020-03-06 RX ADMIN — AZACITIDINE 150 MG: 100 INJECTION, POWDER, LYOPHILIZED, FOR SOLUTION INTRAVENOUS; SUBCUTANEOUS at 11:03

## 2020-03-06 NOTE — PROGRESS NOTES
Outpatient Care Management   - Low Risk Patient Assessment    Patient: Susan Puente  MRN:  [id number]  Date of Service:  3/6/2020  Completed by:  Abbi Hansen LMSW  Referral Date: 03/05/2020  Program: OPCM Low Risk    Reason for Visit   Patient presents with    Social Work Assessment - Low/Mod Risk       Patient Summary     OPCM Social Work Assessment (Low/Moderate Risk)    General  Level of Caregiver support:  Assistance needed but no caregiver available  Have you had to make a decision between paying for any of the following in the last 2 months?:  None  Transportation means:  Self  Employment status:  Disabled  Do you have any of the following?:  Medical power of , Living will  Current symptoms:  None  Assessments  Was the PHQ Depression Screening completed this visit?:  No  Was the JASON-7 Screening completed this visit?:  No     This SW received a referral on the above patient.   Reason for referral: low risk, in-home support  Name of the community resource that was provided: [employer], Private Hire Aides  Resource given to: Patient via telephone and US mail      [facility] contacted patient regarding referral from PCP office. Pt is known to [facility] and previous SW assessment was completed on 2/10/20. Pt confirms she received resources sent by [facility] last month. Pt reports she is still reviewing resources provided. Pt reports she was having difficulty understanding the difference between home health services and home aide. Pt advised that traditional health insurance does not cover non-medical services including assistance with personal care, dressing, bathing, or homemaker (household chores) and these types of services are private pay. Pt is over income for Medicaid and would not qualify for LTC Services. Pt advised to complete [facility] Applications as they offer non-medical services to eligible cancer Pts. [facility] provided patient with telephone  number for [facility] to request homemaker program. Pt will also be provided with list of private hire aids. Pt provided with [facility] contact information and encouraged to contact [facility] directly with any questions or concerns. Case closed and referral source notified.     Complex Care Plan     Care plan was discussed and completed today with input from patient and/or caregiver.    Goals      Patient/caregiver will have knowledge of resources available in order to obtain the services that are needed prior to the end of this encounter.      Overall Time to Completion  1 week from 03/06/2020    Social Work Identified Patient Barriers:   In-Home Support/assistance with ADLs    Short Term Goals  Patient/caregiver will verbalize knowledge of available resources prior to the end of this encounter.   Interventions:  · Discuss and provide community resources telephonically and US mail.  Status:  · Met              Patient Instructions     No follow-ups on file.    Todays OPCM Self-Management Care Plan was developed with the patients/caregivers input and was based on identified barriers from todays assessment.  Goals were written today with the patient/caregiver and the patient has agreed to work towards these goals to improve his/her overall well-being. Patient verbalized understanding of the care plan, goals, and all of today's instructions. Encouraged patient/caregiver to communicate with his/her physician and health care team about health conditions and the treatment plan.  Provided my contact information today and encouraged patient/caregiver to call me with any questions as needed.

## 2020-03-06 NOTE — PROGRESS NOTES
Attempt #: 1  This LMSW attempted to reach patient/caregiver to provide resource and left a message requesting a return call.

## 2020-03-07 ENCOUNTER — INFUSION (OUTPATIENT)
Dept: INFUSION THERAPY | Facility: HOSPITAL | Age: 70
End: 2020-03-07
Attending: OPTOMETRIST
Payer: MEDICARE

## 2020-03-07 VITALS
RESPIRATION RATE: 18 BRPM | SYSTOLIC BLOOD PRESSURE: 141 MMHG | TEMPERATURE: 98 F | HEART RATE: 101 BPM | DIASTOLIC BLOOD PRESSURE: 65 MMHG

## 2020-03-07 DIAGNOSIS — D46.C MDS (MYELODYSPLASTIC SYNDROME) WITH 5Q DELETION: Primary | ICD-10-CM

## 2020-03-07 PROCEDURE — 63600175 PHARM REV CODE 636 W HCPCS: Mod: JG,HCNC | Performed by: INTERNAL MEDICINE

## 2020-03-07 PROCEDURE — 96401 CHEMO ANTI-NEOPL SQ/IM: CPT | Mod: HCNC

## 2020-03-07 RX ORDER — AZACITIDINE 100 MG/1
75 INJECTION, POWDER, LYOPHILIZED, FOR SOLUTION INTRAVENOUS; SUBCUTANEOUS
Status: COMPLETED | OUTPATIENT
Start: 2020-03-07 | End: 2020-03-07

## 2020-03-07 RX ADMIN — AZACITIDINE 150 MG: 100 INJECTION, POWDER, LYOPHILIZED, FOR SOLUTION INTRAVENOUS; SUBCUTANEOUS at 10:03

## 2020-03-07 NOTE — NURSING
1052- Patient arrived ambulatory to the unit for last of her vidaza injections.  Patient reports tolerating treatment without issues, shows no signs of distress today.  Injections to abdomen tolerated without issue and patient was discharged to home setting,  AVS given on discharge.

## 2020-03-09 ENCOUNTER — TELEPHONE (OUTPATIENT)
Dept: HEMATOLOGY/ONCOLOGY | Facility: CLINIC | Age: 70
End: 2020-03-09

## 2020-03-09 DIAGNOSIS — D46.C MDS (MYELODYSPLASTIC SYNDROME) WITH 5Q DELETION: ICD-10-CM

## 2020-03-09 RX ORDER — ONDANSETRON 8 MG/1
TABLET, ORALLY DISINTEGRATING ORAL
Qty: 30 TABLET | Refills: 6 | Status: SHIPPED | OUTPATIENT
Start: 2020-03-09 | End: 2021-05-19 | Stop reason: SDUPTHER

## 2020-03-09 NOTE — TELEPHONE ENCOUNTER
"----- Message from Vanesa Garcias sent at 3/9/2020 10:31 AM CDT -----  Contact: Susan   Reschedule Existing Appointment    Appt Date: 3/09/2020  Type of appt : NON FASTING LAB [8709] & INFUSION 180 MIN [2424]  Physician: Dr. Valdes   Reason for rescheduling? Patient request to reschedule appointment to 3/10/2020  Caller: Susan   Contact Preference: [phone]    Additional Information:  "Thank you for all that you do for our patients'"      "

## 2020-03-09 NOTE — TELEPHONE ENCOUNTER
"----- Message from Vanesa Garcias sent at 3/9/2020 10:37 AM CDT -----  Contact: Karin   Refill     RX Name and Strength:  citalopram (CELEXA) 20 MG tablet  ondansetron (ZOFRAN-ODT) 8 MG TbDL    How is the patient currently taking it?   Is this a 30 day or 90 day Rx? 30 day   Preferred Pharmacy with phone number:  [facility] [phone] (Phone)  [phone] (Fax)      Local or Mail Order: Local   Ordering Provider: Dr. Valdes   Contact Preference: [phone]  Additional Information:  Patient would like to get Emlo cream previously discussed. Please contact to discuss   "Thank you for all that you do for our patients"    "

## 2020-03-10 ENCOUNTER — INFUSION (OUTPATIENT)
Dept: INFUSION THERAPY | Facility: HOSPITAL | Age: 70
End: 2020-03-10
Attending: NURSE PRACTITIONER
Payer: MEDICARE

## 2020-03-10 ENCOUNTER — LAB VISIT (OUTPATIENT)
Dept: LAB | Facility: HOSPITAL | Age: 70
End: 2020-03-10
Attending: INTERNAL MEDICINE
Payer: MEDICARE

## 2020-03-10 VITALS
TEMPERATURE: 98 F | RESPIRATION RATE: 18 BRPM | DIASTOLIC BLOOD PRESSURE: 81 MMHG | SYSTOLIC BLOOD PRESSURE: 184 MMHG | HEART RATE: 85 BPM

## 2020-03-10 DIAGNOSIS — D64.9 ANEMIA REQUIRING TRANSFUSIONS: ICD-10-CM

## 2020-03-10 DIAGNOSIS — N17.9 AKI (ACUTE KIDNEY INJURY): ICD-10-CM

## 2020-03-10 DIAGNOSIS — D46.C MDS (MYELODYSPLASTIC SYNDROME) WITH 5Q DELETION: ICD-10-CM

## 2020-03-10 DIAGNOSIS — E11.22 CONTROLLED TYPE 2 DIABETES MELLITUS WITH STAGE 3 CHRONIC KIDNEY DISEASE, WITHOUT LONG-TERM CURRENT USE OF INSULIN: ICD-10-CM

## 2020-03-10 DIAGNOSIS — D46.C MDS (MYELODYSPLASTIC SYNDROME) WITH 5Q DELETION: Primary | ICD-10-CM

## 2020-03-10 DIAGNOSIS — N18.30 CONTROLLED TYPE 2 DIABETES MELLITUS WITH STAGE 3 CHRONIC KIDNEY DISEASE, WITHOUT LONG-TERM CURRENT USE OF INSULIN: ICD-10-CM

## 2020-03-10 DIAGNOSIS — D46.9 MYELODYSPLASTIC SYNDROME: ICD-10-CM

## 2020-03-10 LAB
ABO + RH BLD: NORMAL
ANISOCYTOSIS BLD QL SMEAR: SLIGHT
BASO STIPL BLD QL SMEAR: ABNORMAL
BASOPHILS # BLD AUTO: 0.02 K/UL (ref 0–0.2)
BASOPHILS NFR BLD: 0.8 % (ref 0–1.9)
BLD GP AB SCN CELLS X3 SERPL QL: NORMAL
BLD PROD TYP BPU: NORMAL
BLOOD UNIT EXPIRATION DATE: NORMAL
BLOOD UNIT TYPE CODE: 6200
BLOOD UNIT TYPE: NORMAL
CODING SYSTEM: NORMAL
DIFFERENTIAL METHOD: ABNORMAL
DISPENSE STATUS: NORMAL
EOSINOPHIL # BLD AUTO: 0.1 K/UL (ref 0–0.5)
EOSINOPHIL NFR BLD: 3.2 % (ref 0–8)
ERYTHROCYTE [DISTWIDTH] IN BLOOD BY AUTOMATED COUNT: 19.7 % (ref 11.5–14.5)
ESTIMATED AVG GLUCOSE: 140 MG/DL (ref 68–131)
GIANT PLATELETS BLD QL SMEAR: PRESENT
HBA1C MFR BLD HPLC: 6.5 % (ref 4–5.6)
HCT VFR BLD AUTO: 24 % (ref 37–48.5)
HGB BLD-MCNC: 7.5 G/DL (ref 12–16)
HYPOCHROMIA BLD QL SMEAR: ABNORMAL
IMM GRANULOCYTES # BLD AUTO: 0.04 K/UL (ref 0–0.04)
IMM GRANULOCYTES NFR BLD AUTO: 1.6 % (ref 0–0.5)
LYMPHOCYTES # BLD AUTO: 1 K/UL (ref 1–4.8)
LYMPHOCYTES NFR BLD: 41.9 % (ref 18–48)
MCH RBC QN AUTO: 28.6 PG (ref 27–31)
MCHC RBC AUTO-ENTMCNC: 31.3 G/DL (ref 32–36)
MCV RBC AUTO: 92 FL (ref 82–98)
MONOCYTES # BLD AUTO: 0.1 K/UL (ref 0.3–1)
MONOCYTES NFR BLD: 3.2 % (ref 4–15)
NEUTROPHILS # BLD AUTO: 1.2 K/UL (ref 1.8–7.7)
NEUTROPHILS NFR BLD: 49.3 % (ref 38–73)
NRBC BLD-RTO: 0 /100 WBC
NUM UNITS TRANS PACKED RBC: NORMAL
PLATELET # BLD AUTO: 135 K/UL (ref 150–350)
PLATELET BLD QL SMEAR: ABNORMAL
PMV BLD AUTO: 11.3 FL (ref 9.2–12.9)
POIKILOCYTOSIS BLD QL SMEAR: SLIGHT
POLYCHROMASIA BLD QL SMEAR: ABNORMAL
RBC # BLD AUTO: 2.62 M/UL (ref 4–5.4)
SPHEROCYTES BLD QL SMEAR: ABNORMAL
WBC # BLD AUTO: 2.48 K/UL (ref 3.9–12.7)

## 2020-03-10 PROCEDURE — A4216 STERILE WATER/SALINE, 10 ML: HCPCS | Mod: HCNC | Performed by: NURSE PRACTITIONER

## 2020-03-10 PROCEDURE — 63600175 PHARM REV CODE 636 W HCPCS: Mod: HCNC | Performed by: NURSE PRACTITIONER

## 2020-03-10 PROCEDURE — 86901 BLOOD TYPING SEROLOGIC RH(D): CPT | Mod: HCNC

## 2020-03-10 PROCEDURE — 86920 COMPATIBILITY TEST SPIN: CPT | Mod: HCNC

## 2020-03-10 PROCEDURE — 36430 TRANSFUSION BLD/BLD COMPNT: CPT | Mod: HCNC

## 2020-03-10 PROCEDURE — 25000003 PHARM REV CODE 250: Mod: HCNC | Performed by: INTERNAL MEDICINE

## 2020-03-10 PROCEDURE — P9040 RBC LEUKOREDUCED IRRADIATED: HCPCS | Mod: HCNC

## 2020-03-10 PROCEDURE — 83036 HEMOGLOBIN GLYCOSYLATED A1C: CPT | Mod: HCNC

## 2020-03-10 PROCEDURE — 85025 COMPLETE CBC W/AUTO DIFF WBC: CPT | Mod: HCNC

## 2020-03-10 PROCEDURE — 25000003 PHARM REV CODE 250: Mod: HCNC | Performed by: NURSE PRACTITIONER

## 2020-03-10 PROCEDURE — 63600175 PHARM REV CODE 636 W HCPCS: Mod: HCNC | Performed by: INTERNAL MEDICINE

## 2020-03-10 RX ORDER — SODIUM CHLORIDE 0.9 % (FLUSH) 0.9 %
10 SYRINGE (ML) INJECTION
Status: DISCONTINUED | OUTPATIENT
Start: 2020-03-10 | End: 2020-03-10 | Stop reason: HOSPADM

## 2020-03-10 RX ORDER — HEPARIN 100 UNIT/ML
500 SYRINGE INTRAVENOUS
Status: DISCONTINUED | OUTPATIENT
Start: 2020-03-10 | End: 2020-03-10 | Stop reason: HOSPADM

## 2020-03-10 RX ORDER — HYDROCODONE BITARTRATE AND ACETAMINOPHEN 500; 5 MG/1; MG/1
TABLET ORAL ONCE
Status: CANCELLED | OUTPATIENT
Start: 2020-03-10 | End: 2020-03-10

## 2020-03-10 RX ORDER — DIPHENHYDRAMINE HCL 25 MG
25 CAPSULE ORAL
Status: COMPLETED | OUTPATIENT
Start: 2020-03-10 | End: 2020-03-10

## 2020-03-10 RX ORDER — DIPHENHYDRAMINE HCL 25 MG
25 CAPSULE ORAL
Status: CANCELLED | OUTPATIENT
Start: 2020-03-10

## 2020-03-10 RX ORDER — HYDROCODONE BITARTRATE AND ACETAMINOPHEN 500; 5 MG/1; MG/1
TABLET ORAL ONCE
Status: COMPLETED | OUTPATIENT
Start: 2020-03-10 | End: 2020-03-10

## 2020-03-10 RX ORDER — ACETAMINOPHEN 325 MG/1
650 TABLET ORAL
Status: CANCELLED | OUTPATIENT
Start: 2020-03-10

## 2020-03-10 RX ORDER — ACETAMINOPHEN 325 MG/1
650 TABLET ORAL
Status: COMPLETED | OUTPATIENT
Start: 2020-03-10 | End: 2020-03-10

## 2020-03-10 RX ADMIN — HEPARIN 500 UNITS: 100 SYRINGE at 01:03

## 2020-03-10 RX ADMIN — DIPHENHYDRAMINE HYDROCHLORIDE 25 MG: 25 CAPSULE ORAL at 10:03

## 2020-03-10 RX ADMIN — SODIUM CHLORIDE: 0.9 INJECTION, SOLUTION INTRAVENOUS at 10:03

## 2020-03-10 RX ADMIN — ACETAMINOPHEN 650 MG: 325 TABLET ORAL at 10:03

## 2020-03-10 RX ADMIN — Medication 10 ML: at 01:03

## 2020-03-11 RX ORDER — LIDOCAINE AND PRILOCAINE 25; 25 MG/G; MG/G
CREAM TOPICAL
Qty: 25 G | Refills: 0 | Status: SHIPPED | OUTPATIENT
Start: 2020-03-11 | End: 2021-01-01

## 2020-03-16 ENCOUNTER — LAB VISIT (OUTPATIENT)
Dept: LAB | Facility: HOSPITAL | Age: 70
End: 2020-03-16
Payer: MEDICARE

## 2020-03-16 DIAGNOSIS — D46.C MDS (MYELODYSPLASTIC SYNDROME) WITH 5Q DELETION: ICD-10-CM

## 2020-03-16 LAB
ABO + RH BLD: NORMAL
ANISOCYTOSIS BLD QL SMEAR: SLIGHT
BASOPHILS # BLD AUTO: 0.01 K/UL (ref 0–0.2)
BASOPHILS NFR BLD: 0.4 % (ref 0–1.9)
BLD GP AB SCN CELLS X3 SERPL QL: NORMAL
DIFFERENTIAL METHOD: ABNORMAL
EOSINOPHIL # BLD AUTO: 0.1 K/UL (ref 0–0.5)
EOSINOPHIL NFR BLD: 3.4 % (ref 0–8)
ERYTHROCYTE [DISTWIDTH] IN BLOOD BY AUTOMATED COUNT: 18.2 % (ref 11.5–14.5)
HCT VFR BLD AUTO: 26.2 % (ref 37–48.5)
HGB BLD-MCNC: 8.2 G/DL (ref 12–16)
HYPOCHROMIA BLD QL SMEAR: ABNORMAL
IMM GRANULOCYTES # BLD AUTO: 0.02 K/UL (ref 0–0.04)
IMM GRANULOCYTES NFR BLD AUTO: 0.8 % (ref 0–0.5)
LYMPHOCYTES # BLD AUTO: 1.1 K/UL (ref 1–4.8)
LYMPHOCYTES NFR BLD: 46.2 % (ref 18–48)
MCH RBC QN AUTO: 28.6 PG (ref 27–31)
MCHC RBC AUTO-ENTMCNC: 31.3 G/DL (ref 32–36)
MCV RBC AUTO: 91 FL (ref 82–98)
MONOCYTES # BLD AUTO: 0 K/UL (ref 0.3–1)
MONOCYTES NFR BLD: 1.7 % (ref 4–15)
NEUTROPHILS # BLD AUTO: 1.1 K/UL (ref 1.8–7.7)
NEUTROPHILS NFR BLD: 47.5 % (ref 38–73)
NRBC BLD-RTO: 0 /100 WBC
OVALOCYTES BLD QL SMEAR: ABNORMAL
PLATELET # BLD AUTO: 113 K/UL (ref 150–350)
PLATELET BLD QL SMEAR: ABNORMAL
PMV BLD AUTO: 12 FL (ref 9.2–12.9)
POIKILOCYTOSIS BLD QL SMEAR: SLIGHT
POLYCHROMASIA BLD QL SMEAR: ABNORMAL
RBC # BLD AUTO: 2.87 M/UL (ref 4–5.4)
WBC # BLD AUTO: 2.36 K/UL (ref 3.9–12.7)

## 2020-03-16 PROCEDURE — 36415 COLL VENOUS BLD VENIPUNCTURE: CPT | Mod: HCNC

## 2020-03-16 PROCEDURE — 85025 COMPLETE CBC W/AUTO DIFF WBC: CPT | Mod: HCNC

## 2020-03-16 PROCEDURE — 86850 RBC ANTIBODY SCREEN: CPT | Mod: HCNC

## 2020-03-17 DIAGNOSIS — M54.31 BILATERAL SCIATICA: ICD-10-CM

## 2020-03-17 DIAGNOSIS — M54.32 BILATERAL SCIATICA: ICD-10-CM

## 2020-03-17 DIAGNOSIS — E87.6 HYPOKALEMIA: ICD-10-CM

## 2020-03-17 NOTE — TELEPHONE ENCOUNTER
"----- Message from Vanesa Garcias sent at 3/17/2020  4:19 PM CDT -----  Contact: Susan   Refill     RX Name and Strength:  potassium chloride SA (K-DUR,KLOR-CON) 20 MEQ tablet  gabapentin (NEURONTIN) 100 MG capsule    How is the patient currently taking it?   Is this a 30 day or 90 day Rx? 30 day     Preferred Pharmacy with phone number:  [facility] [phone] (Phone)  [phone] (Fax)      Local or Mail Order: Local     Ordering Provider: Dr Valdes     Contact Preference: [phone]    Additional Information:  "Thank you for all that you do for our patients"    "

## 2020-03-18 RX ORDER — POTASSIUM CHLORIDE 20 MEQ/1
20 TABLET, EXTENDED RELEASE ORAL DAILY
Qty: 30 TABLET | Refills: 4 | Status: SHIPPED | OUTPATIENT
Start: 2020-03-18 | End: 2020-10-21 | Stop reason: SDUPTHER

## 2020-03-18 RX ORDER — GABAPENTIN 100 MG/1
100 CAPSULE ORAL DAILY
Qty: 60 CAPSULE | Refills: 2
Start: 2020-03-18 | End: 2020-03-18 | Stop reason: SDUPTHER

## 2020-03-18 NOTE — TELEPHONE ENCOUNTER
Medication sent to pharmacy      ----- Message from Alia Hendricks sent at 3/18/2020  4:46 PM CDT -----  Contact: Pt  Refill or New Rx: Refill    RX Name and Strength: gabapentin (NEURONTIN) 100 MG capsule  [facility] [address] [place] [address]  Phone: [phone] Fax: [phone]    Ordering Provider: Lucio Lau Call Back Number: [phone] (M)    Additional Information: Pt is out of this medication

## 2020-03-19 ENCOUNTER — PATIENT MESSAGE (OUTPATIENT)
Dept: HEMATOLOGY/ONCOLOGY | Facility: CLINIC | Age: 70
End: 2020-03-19

## 2020-03-19 DIAGNOSIS — M54.32 BILATERAL SCIATICA: ICD-10-CM

## 2020-03-19 DIAGNOSIS — M54.31 BILATERAL SCIATICA: ICD-10-CM

## 2020-03-19 RX ORDER — GABAPENTIN 100 MG/1
100 CAPSULE ORAL DAILY
Qty: 60 CAPSULE | Refills: 2 | Status: SHIPPED | OUTPATIENT
Start: 2020-03-19 | End: 2020-09-13 | Stop reason: SDUPTHER

## 2020-03-19 RX ORDER — GABAPENTIN 100 MG/1
100 CAPSULE ORAL DAILY
Qty: 60 CAPSULE | Refills: 2 | Status: CANCELLED | OUTPATIENT
Start: 2020-03-19 | End: 2021-03-19

## 2020-03-19 RX ORDER — GABAPENTIN 100 MG/1
100 CAPSULE ORAL DAILY
Qty: 60 CAPSULE | Refills: 2 | Status: SHIPPED | OUTPATIENT
Start: 2020-03-19 | End: 2020-03-19 | Stop reason: SDUPTHER

## 2020-03-19 RX ORDER — GABAPENTIN 100 MG/1
100 CAPSULE ORAL DAILY
Qty: 60 CAPSULE | Refills: 2
Start: 2020-03-19 | End: 2020-03-19

## 2020-03-19 NOTE — TELEPHONE ENCOUNTER
----- Message from Noemi Olson sent at 3/19/2020  1:54 PM CDT -----  Contact: patient  Patient called to speak with a nurse concerning her refills.    She would like a callback at [phone]    Thanks  KB

## 2020-03-20 ENCOUNTER — PATIENT MESSAGE (OUTPATIENT)
Dept: HEMATOLOGY/ONCOLOGY | Facility: CLINIC | Age: 70
End: 2020-03-20

## 2020-03-22 ENCOUNTER — TELEPHONE (OUTPATIENT)
Dept: INFUSION THERAPY | Facility: HOSPITAL | Age: 70
End: 2020-03-22

## 2020-03-23 ENCOUNTER — LAB VISIT (OUTPATIENT)
Dept: LAB | Facility: HOSPITAL | Age: 70
End: 2020-03-23
Payer: MEDICARE

## 2020-03-23 DIAGNOSIS — D46.C MDS (MYELODYSPLASTIC SYNDROME) WITH 5Q DELETION: ICD-10-CM

## 2020-03-23 LAB
ABO + RH BLD: NORMAL
ANISOCYTOSIS BLD QL SMEAR: SLIGHT
BASO STIPL BLD QL SMEAR: ABNORMAL
BASOPHILS # BLD AUTO: 0.02 K/UL (ref 0–0.2)
BASOPHILS NFR BLD: 1 % (ref 0–1.9)
BLD GP AB SCN CELLS X3 SERPL QL: NORMAL
DACRYOCYTES BLD QL SMEAR: ABNORMAL
DIFFERENTIAL METHOD: ABNORMAL
EOSINOPHIL # BLD AUTO: 0.1 K/UL (ref 0–0.5)
EOSINOPHIL NFR BLD: 4.9 % (ref 0–8)
ERYTHROCYTE [DISTWIDTH] IN BLOOD BY AUTOMATED COUNT: 18.2 % (ref 11.5–14.5)
GIANT PLATELETS BLD QL SMEAR: PRESENT
HCT VFR BLD AUTO: 23.9 % (ref 37–48.5)
HGB BLD-MCNC: 7.7 G/DL (ref 12–16)
HYPOCHROMIA BLD QL SMEAR: ABNORMAL
IMM GRANULOCYTES # BLD AUTO: 0.01 K/UL (ref 0–0.04)
IMM GRANULOCYTES NFR BLD AUTO: 0.5 % (ref 0–0.5)
LYMPHOCYTES # BLD AUTO: 1.2 K/UL (ref 1–4.8)
LYMPHOCYTES NFR BLD: 57.6 % (ref 18–48)
MCH RBC QN AUTO: 29.2 PG (ref 27–31)
MCHC RBC AUTO-ENTMCNC: 32.2 G/DL (ref 32–36)
MCV RBC AUTO: 91 FL (ref 82–98)
MONOCYTES # BLD AUTO: 0 K/UL (ref 0.3–1)
MONOCYTES NFR BLD: 1.5 % (ref 4–15)
NEUTROPHILS # BLD AUTO: 0.7 K/UL (ref 1.8–7.7)
NEUTROPHILS NFR BLD: 34.5 % (ref 38–73)
NRBC BLD-RTO: 1 /100 WBC
OVALOCYTES BLD QL SMEAR: ABNORMAL
PLATELET # BLD AUTO: 180 K/UL (ref 150–350)
PLATELET BLD QL SMEAR: ABNORMAL
PMV BLD AUTO: 11.6 FL (ref 9.2–12.9)
POIKILOCYTOSIS BLD QL SMEAR: SLIGHT
POLYCHROMASIA BLD QL SMEAR: ABNORMAL
RBC # BLD AUTO: 2.64 M/UL (ref 4–5.4)
WBC # BLD AUTO: 2.05 K/UL (ref 3.9–12.7)

## 2020-03-23 PROCEDURE — 85025 COMPLETE CBC W/AUTO DIFF WBC: CPT | Mod: HCNC

## 2020-03-23 PROCEDURE — 86901 BLOOD TYPING SEROLOGIC RH(D): CPT | Mod: HCNC

## 2020-03-23 NOTE — TELEPHONE ENCOUNTER
Dr. Bazan,    The patient's RX plan prefers Repatha and will not approve Praluent. If you feel this would be an appropriate change, please send over a corresponding prescription and we will get started on the PA.    Thank You,  Yuni Pinzon  Patient Care Advocate  [facility]

## 2020-03-24 ENCOUNTER — TELEPHONE (OUTPATIENT)
Dept: CARDIOLOGY | Facility: CLINIC | Age: 70
End: 2020-03-24

## 2020-03-26 ENCOUNTER — TELEPHONE (OUTPATIENT)
Dept: PHARMACY | Facility: CLINIC | Age: 70
End: 2020-03-26

## 2020-03-26 NOTE — TELEPHONE ENCOUNTER
DOCUMENTATION ONLY:  Prior authorization for Repatha approved from 3/26/2020 to 9/22/2020.    Case ID# [id number]    Co-pay: $47.00    Patient Assistance IS required.    Forward to patient assistance for review. -HBR

## 2020-03-30 ENCOUNTER — INFUSION (OUTPATIENT)
Dept: INFUSION THERAPY | Facility: HOSPITAL | Age: 70
End: 2020-03-30
Attending: INTERNAL MEDICINE
Payer: MEDICARE

## 2020-03-30 VITALS
DIASTOLIC BLOOD PRESSURE: 68 MMHG | RESPIRATION RATE: 18 BRPM | HEART RATE: 78 BPM | BODY MASS INDEX: 28.99 KG/M2 | WEIGHT: 174 LBS | TEMPERATURE: 98 F | HEIGHT: 65 IN | SYSTOLIC BLOOD PRESSURE: 120 MMHG

## 2020-03-30 DIAGNOSIS — D46.C MDS (MYELODYSPLASTIC SYNDROME) WITH 5Q DELETION: Primary | ICD-10-CM

## 2020-03-30 DIAGNOSIS — D46.C MDS (MYELODYSPLASTIC SYNDROME) WITH 5Q DELETION: ICD-10-CM

## 2020-03-30 PROCEDURE — 25000003 PHARM REV CODE 250: Mod: HCNC | Performed by: INTERNAL MEDICINE

## 2020-03-30 PROCEDURE — 36430 TRANSFUSION BLD/BLD COMPNT: CPT | Mod: HCNC

## 2020-03-30 PROCEDURE — 63600175 PHARM REV CODE 636 W HCPCS: Mod: HCNC | Performed by: INTERNAL MEDICINE

## 2020-03-30 PROCEDURE — P9040 RBC LEUKOREDUCED IRRADIATED: HCPCS | Mod: HCNC

## 2020-03-30 PROCEDURE — 86920 COMPATIBILITY TEST SPIN: CPT | Mod: HCNC

## 2020-03-30 RX ORDER — HYDROCODONE BITARTRATE AND ACETAMINOPHEN 500; 5 MG/1; MG/1
TABLET ORAL ONCE
Status: CANCELLED | OUTPATIENT
Start: 2020-03-30 | End: 2020-03-30

## 2020-03-30 RX ORDER — ACETAMINOPHEN 325 MG/1
650 TABLET ORAL
Status: CANCELLED | OUTPATIENT
Start: 2020-03-30

## 2020-03-30 RX ORDER — DIPHENHYDRAMINE HCL 25 MG
25 CAPSULE ORAL
Status: CANCELLED | OUTPATIENT
Start: 2020-03-30

## 2020-03-30 RX ORDER — HYDROCODONE BITARTRATE AND ACETAMINOPHEN 500; 5 MG/1; MG/1
TABLET ORAL ONCE
Status: COMPLETED | OUTPATIENT
Start: 2020-03-30 | End: 2020-03-30

## 2020-03-30 RX ORDER — DIPHENHYDRAMINE HCL 25 MG
25 CAPSULE ORAL
Status: COMPLETED | OUTPATIENT
Start: 2020-03-30 | End: 2020-03-30

## 2020-03-30 RX ORDER — ACETAMINOPHEN 325 MG/1
650 TABLET ORAL
Status: COMPLETED | OUTPATIENT
Start: 2020-03-30 | End: 2020-03-30

## 2020-03-30 RX ADMIN — SODIUM CHLORIDE: 0.9 INJECTION, SOLUTION INTRAVENOUS at 11:03

## 2020-03-30 RX ADMIN — ACETAMINOPHEN 650 MG: 325 TABLET ORAL at 11:03

## 2020-03-30 RX ADMIN — DIPHENHYDRAMINE HYDROCHLORIDE 25 MG: 25 CAPSULE ORAL at 11:03

## 2020-03-30 NOTE — PLAN OF CARE
1420 pt tolerated 1 unit PRBC without issue, pt to rtc tomorrow for labs and possible vidaza injection,  no distress noted upon d/c to home

## 2020-03-30 NOTE — PLAN OF CARE
1130 pt here for 1 unit PRBC, labs, hx, meds, allergies reviewed, pt with c/o feeling weak and tired, ambulates well with walker, reclined in chair, continue to monitor   Home

## 2020-03-31 ENCOUNTER — INFUSION (OUTPATIENT)
Dept: INFUSION THERAPY | Facility: HOSPITAL | Age: 70
End: 2020-03-31
Attending: INTERNAL MEDICINE
Payer: MEDICARE

## 2020-03-31 ENCOUNTER — OFFICE VISIT (OUTPATIENT)
Dept: HEMATOLOGY/ONCOLOGY | Facility: CLINIC | Age: 70
End: 2020-03-31
Payer: MEDICARE

## 2020-03-31 VITALS
SYSTOLIC BLOOD PRESSURE: 131 MMHG | HEART RATE: 77 BPM | DIASTOLIC BLOOD PRESSURE: 60 MMHG | TEMPERATURE: 99 F | RESPIRATION RATE: 18 BRPM

## 2020-03-31 DIAGNOSIS — D46.C MDS (MYELODYSPLASTIC SYNDROME) WITH 5Q DELETION: Primary | ICD-10-CM

## 2020-03-31 DIAGNOSIS — D64.9 ANEMIA REQUIRING TRANSFUSIONS: ICD-10-CM

## 2020-03-31 PROCEDURE — 1101F PT FALLS ASSESS-DOCD LE1/YR: CPT | Mod: HCNC,CPTII,95, | Performed by: INTERNAL MEDICINE

## 2020-03-31 PROCEDURE — 99215 OFFICE O/P EST HI 40 MIN: CPT | Mod: HCNC,95,, | Performed by: INTERNAL MEDICINE

## 2020-03-31 PROCEDURE — 63600175 PHARM REV CODE 636 W HCPCS: Mod: JG,HCNC | Performed by: INTERNAL MEDICINE

## 2020-03-31 PROCEDURE — 96401 CHEMO ANTI-NEOPL SQ/IM: CPT | Mod: HCNC

## 2020-03-31 PROCEDURE — 1159F MED LIST DOCD IN RCRD: CPT | Mod: HCNC,95,, | Performed by: INTERNAL MEDICINE

## 2020-03-31 PROCEDURE — 1159F PR MEDICATION LIST DOCUMENTED IN MEDICAL RECORD: ICD-10-PCS | Mod: HCNC,95,, | Performed by: INTERNAL MEDICINE

## 2020-03-31 PROCEDURE — 99215 PR OFFICE/OUTPT VISIT, EST, LEVL V, 40-54 MIN: ICD-10-PCS | Mod: HCNC,95,, | Performed by: INTERNAL MEDICINE

## 2020-03-31 PROCEDURE — 1101F PR PT FALLS ASSESS DOC 0-1 FALLS W/OUT INJ PAST YR: ICD-10-PCS | Mod: HCNC,CPTII,95, | Performed by: INTERNAL MEDICINE

## 2020-03-31 RX ORDER — ONDANSETRON 2 MG/ML
8 INJECTION INTRAMUSCULAR; INTRAVENOUS
Status: CANCELLED | OUTPATIENT
Start: 2020-05-02

## 2020-03-31 RX ORDER — ONDANSETRON 2 MG/ML
8 INJECTION INTRAMUSCULAR; INTRAVENOUS
Status: CANCELLED
Start: 2020-04-30

## 2020-03-31 RX ORDER — AZACITIDINE 100 MG/1
75 INJECTION, POWDER, LYOPHILIZED, FOR SOLUTION INTRAVENOUS; SUBCUTANEOUS
Status: CANCELLED | OUTPATIENT
Start: 2020-04-29

## 2020-03-31 RX ORDER — AZACITIDINE 100 MG/1
75 INJECTION, POWDER, LYOPHILIZED, FOR SOLUTION INTRAVENOUS; SUBCUTANEOUS
Status: CANCELLED | OUTPATIENT
Start: 2020-05-01

## 2020-03-31 RX ORDER — AZACITIDINE 100 MG/1
75 INJECTION, POWDER, LYOPHILIZED, FOR SOLUTION INTRAVENOUS; SUBCUTANEOUS
Status: CANCELLED | OUTPATIENT
Start: 2020-04-30

## 2020-03-31 RX ORDER — ONDANSETRON 2 MG/ML
8 INJECTION INTRAMUSCULAR; INTRAVENOUS
Status: CANCELLED | OUTPATIENT
Start: 2020-04-29

## 2020-03-31 RX ORDER — AZACITIDINE 100 MG/1
75 INJECTION, POWDER, LYOPHILIZED, FOR SOLUTION INTRAVENOUS; SUBCUTANEOUS
Status: CANCELLED | OUTPATIENT
Start: 2020-05-02

## 2020-03-31 RX ORDER — ONDANSETRON 2 MG/ML
8 INJECTION INTRAMUSCULAR; INTRAVENOUS
Status: CANCELLED | OUTPATIENT
Start: 2020-04-28

## 2020-03-31 RX ORDER — AZACITIDINE 100 MG/1
75 INJECTION, POWDER, LYOPHILIZED, FOR SOLUTION INTRAVENOUS; SUBCUTANEOUS
Status: COMPLETED | OUTPATIENT
Start: 2020-03-31 | End: 2020-03-31

## 2020-03-31 RX ORDER — AZACITIDINE 100 MG/1
75 INJECTION, POWDER, LYOPHILIZED, FOR SOLUTION INTRAVENOUS; SUBCUTANEOUS
Status: CANCELLED | OUTPATIENT
Start: 2020-04-28

## 2020-03-31 RX ORDER — ONDANSETRON 2 MG/ML
8 INJECTION INTRAMUSCULAR; INTRAVENOUS
Status: DISCONTINUED | OUTPATIENT
Start: 2020-03-31 | End: 2020-03-31 | Stop reason: HOSPADM

## 2020-03-31 RX ORDER — ONDANSETRON 2 MG/ML
8 INJECTION INTRAMUSCULAR; INTRAVENOUS
Status: CANCELLED | OUTPATIENT
Start: 2020-05-01

## 2020-03-31 RX ADMIN — AZACITIDINE 150 MG: 100 INJECTION, POWDER, LYOPHILIZED, FOR SOLUTION INTRAVENOUS; SUBCUTANEOUS at 10:03

## 2020-04-01 ENCOUNTER — INFUSION (OUTPATIENT)
Dept: INFUSION THERAPY | Facility: HOSPITAL | Age: 70
End: 2020-04-01
Attending: INTERNAL MEDICINE
Payer: MEDICARE

## 2020-04-01 VITALS
DIASTOLIC BLOOD PRESSURE: 60 MMHG | SYSTOLIC BLOOD PRESSURE: 126 MMHG | HEIGHT: 65 IN | TEMPERATURE: 99 F | WEIGHT: 171.75 LBS | HEART RATE: 81 BPM | BODY MASS INDEX: 28.61 KG/M2

## 2020-04-01 DIAGNOSIS — D46.C MDS (MYELODYSPLASTIC SYNDROME) WITH 5Q DELETION: Primary | ICD-10-CM

## 2020-04-01 PROCEDURE — 63600175 PHARM REV CODE 636 W HCPCS: Mod: JG,HCNC | Performed by: INTERNAL MEDICINE

## 2020-04-01 PROCEDURE — 96401 CHEMO ANTI-NEOPL SQ/IM: CPT | Mod: HCNC

## 2020-04-01 RX ORDER — AZACITIDINE 100 MG/1
75 INJECTION, POWDER, LYOPHILIZED, FOR SOLUTION INTRAVENOUS; SUBCUTANEOUS
Status: COMPLETED | OUTPATIENT
Start: 2020-04-01 | End: 2020-04-01

## 2020-04-01 RX ADMIN — AZACITIDINE 150 MG: 100 INJECTION, POWDER, LYOPHILIZED, FOR SOLUTION INTRAVENOUS; SUBCUTANEOUS at 12:04

## 2020-04-01 NOTE — TELEPHONE ENCOUNTER
Financial Assistance for Repatha approved from 3/2/20 to 3./1/21  [facility]  BIN: [id number]  PCN: [id number]  Id: [id number]  GRP: [id number]  SAVINGS $ [id number]

## 2020-04-02 ENCOUNTER — INFUSION (OUTPATIENT)
Dept: INFUSION THERAPY | Facility: HOSPITAL | Age: 70
End: 2020-04-02
Attending: INTERNAL MEDICINE
Payer: MEDICARE

## 2020-04-02 VITALS
TEMPERATURE: 98 F | DIASTOLIC BLOOD PRESSURE: 65 MMHG | HEART RATE: 91 BPM | SYSTOLIC BLOOD PRESSURE: 143 MMHG | RESPIRATION RATE: 18 BRPM

## 2020-04-02 DIAGNOSIS — D46.C MDS (MYELODYSPLASTIC SYNDROME) WITH 5Q DELETION: Primary | ICD-10-CM

## 2020-04-02 DIAGNOSIS — E87.6 HYPOKALEMIA: ICD-10-CM

## 2020-04-02 PROCEDURE — 96401 CHEMO ANTI-NEOPL SQ/IM: CPT | Mod: HCNC

## 2020-04-02 PROCEDURE — 63600175 PHARM REV CODE 636 W HCPCS: Mod: JW,JG,HCNC | Performed by: INTERNAL MEDICINE

## 2020-04-02 RX ORDER — ONDANSETRON 2 MG/ML
8 INJECTION INTRAMUSCULAR; INTRAVENOUS
Status: DISCONTINUED | OUTPATIENT
Start: 2020-04-02 | End: 2020-04-02 | Stop reason: HOSPADM

## 2020-04-02 RX ORDER — AZACITIDINE 100 MG/1
75 INJECTION, POWDER, LYOPHILIZED, FOR SOLUTION INTRAVENOUS; SUBCUTANEOUS
Status: COMPLETED | OUTPATIENT
Start: 2020-04-02 | End: 2020-04-02

## 2020-04-02 RX ADMIN — AZACITIDINE 150 MG: 100 INJECTION, POWDER, LYOPHILIZED, FOR SOLUTION INTRAVENOUS; SUBCUTANEOUS at 11:04

## 2020-04-02 NOTE — TELEPHONE ENCOUNTER
Patient reached in regards to Repatha RX. She confirms two patient identifiers - name + . She has been on Praluent in the past, so declines consultation and injection training. She has a friend that is an MA that injects for her. They were using every other  dosing schedule, and she would like to keep that schedule if possible. It's been about a month since her last dose of Praluent. [facility] to ship out Repatha 140mg/ml Sureclick PENS on 20 to arrive at patient's home on 4/3/20 via [employer]. Address confirmed. $0 copay (#6ml - 84d/s ---pt strongly advised to refrigerate upon arrival; only good at 30 days room temp). She previously received 3 month supply with Praluent, so requested same for Repatha. She voiced understanding of importance of refrigeration to ensure stability of 84 day supply. She will start Repatha on 20. [facility] services, supplies, shipping and packaging explained to patient. No further questions or concerns. [facility] to f/u in ~ 2.5 months for refills. TTN

## 2020-04-03 ENCOUNTER — INFUSION (OUTPATIENT)
Dept: INFUSION THERAPY | Facility: HOSPITAL | Age: 70
End: 2020-04-03
Attending: INTERNAL MEDICINE
Payer: MEDICARE

## 2020-04-03 VITALS
SYSTOLIC BLOOD PRESSURE: 143 MMHG | TEMPERATURE: 98 F | RESPIRATION RATE: 18 BRPM | DIASTOLIC BLOOD PRESSURE: 61 MMHG | HEART RATE: 103 BPM

## 2020-04-03 DIAGNOSIS — K21.9 GASTROESOPHAGEAL REFLUX DISEASE WITHOUT ESOPHAGITIS: ICD-10-CM

## 2020-04-03 DIAGNOSIS — D46.C MDS (MYELODYSPLASTIC SYNDROME) WITH 5Q DELETION: Primary | ICD-10-CM

## 2020-04-03 PROCEDURE — 63600175 PHARM REV CODE 636 W HCPCS: Mod: JG,HCNC | Performed by: INTERNAL MEDICINE

## 2020-04-03 PROCEDURE — 96401 CHEMO ANTI-NEOPL SQ/IM: CPT | Mod: HCNC

## 2020-04-03 RX ORDER — AZACITIDINE 100 MG/1
75 INJECTION, POWDER, LYOPHILIZED, FOR SOLUTION INTRAVENOUS; SUBCUTANEOUS
Status: COMPLETED | OUTPATIENT
Start: 2020-04-03 | End: 2020-04-03

## 2020-04-03 RX ORDER — PANTOPRAZOLE SODIUM 40 MG/1
40 TABLET, DELAYED RELEASE ORAL DAILY
Qty: 30 TABLET | Refills: 1 | Status: SHIPPED | OUTPATIENT
Start: 2020-04-03 | End: 2020-07-15 | Stop reason: SDUPTHER

## 2020-04-03 RX ADMIN — AZACITIDINE 150 MG: 100 INJECTION, POWDER, LYOPHILIZED, FOR SOLUTION INTRAVENOUS; SUBCUTANEOUS at 12:04

## 2020-04-03 NOTE — NURSING
Patient received D4 Vidaza injection x 3 to ABD. Tolerated well.  RTC tomorrow for D5.  VSS.  Patient ambulated off unit with walker.

## 2020-04-04 ENCOUNTER — INFUSION (OUTPATIENT)
Dept: INFUSION THERAPY | Facility: HOSPITAL | Age: 70
End: 2020-04-04
Attending: INTERNAL MEDICINE
Payer: MEDICARE

## 2020-04-04 VITALS
SYSTOLIC BLOOD PRESSURE: 125 MMHG | TEMPERATURE: 98 F | DIASTOLIC BLOOD PRESSURE: 60 MMHG | HEART RATE: 107 BPM | RESPIRATION RATE: 18 BRPM

## 2020-04-04 DIAGNOSIS — D46.C MDS (MYELODYSPLASTIC SYNDROME) WITH 5Q DELETION: Primary | ICD-10-CM

## 2020-04-04 DIAGNOSIS — E87.6 HYPOKALEMIA: ICD-10-CM

## 2020-04-04 PROCEDURE — 63600175 PHARM REV CODE 636 W HCPCS: Mod: JG,HCNC | Performed by: INTERNAL MEDICINE

## 2020-04-04 PROCEDURE — 96401 CHEMO ANTI-NEOPL SQ/IM: CPT | Mod: HCNC

## 2020-04-04 RX ORDER — AZACITIDINE 100 MG/1
75 INJECTION, POWDER, LYOPHILIZED, FOR SOLUTION INTRAVENOUS; SUBCUTANEOUS
Status: COMPLETED | OUTPATIENT
Start: 2020-04-04 | End: 2020-04-04

## 2020-04-04 RX ORDER — ONDANSETRON 2 MG/ML
8 INJECTION INTRAMUSCULAR; INTRAVENOUS
Status: DISCONTINUED | OUTPATIENT
Start: 2020-04-04 | End: 2020-04-04 | Stop reason: HOSPADM

## 2020-04-04 RX ADMIN — AZACITIDINE 150 MG: 100 INJECTION, POWDER, LYOPHILIZED, FOR SOLUTION INTRAVENOUS; SUBCUTANEOUS at 10:04

## 2020-04-04 NOTE — NURSING
1021-VS stable. Vidaza injections x3 administered SQ to abdomen, pt tolerated well. Band-aids applied to site. Pt discharged with no distress noted, ambulating independently with rollator. RTC 4/6/20, pt verbalized understanding. AVS printed and given to pt.

## 2020-04-06 ENCOUNTER — INFUSION (OUTPATIENT)
Dept: INFUSION THERAPY | Facility: HOSPITAL | Age: 70
End: 2020-04-06
Attending: INTERNAL MEDICINE
Payer: MEDICARE

## 2020-04-06 NOTE — NURSING
Dr. Valdes reviewed labs. Per MD, we will not transfuse blood today. Instructed patient to call MD or go to ER with any problems. Pt discharged home independently.

## 2020-04-13 ENCOUNTER — LAB VISIT (OUTPATIENT)
Dept: LAB | Facility: HOSPITAL | Age: 70
End: 2020-04-13
Payer: MEDICARE

## 2020-04-13 ENCOUNTER — INFUSION (OUTPATIENT)
Dept: INFUSION THERAPY | Facility: HOSPITAL | Age: 70
End: 2020-04-13
Attending: INTERNAL MEDICINE
Payer: MEDICARE

## 2020-04-13 VITALS
RESPIRATION RATE: 18 BRPM | SYSTOLIC BLOOD PRESSURE: 151 MMHG | DIASTOLIC BLOOD PRESSURE: 57 MMHG | HEART RATE: 82 BPM | TEMPERATURE: 98 F

## 2020-04-13 DIAGNOSIS — D46.9 MYELODYSPLASTIC SYNDROME: Primary | ICD-10-CM

## 2020-04-13 DIAGNOSIS — D46.9 MYELODYSPLASTIC SYNDROME: ICD-10-CM

## 2020-04-13 DIAGNOSIS — D46.C MDS (MYELODYSPLASTIC SYNDROME) WITH 5Q DELETION: ICD-10-CM

## 2020-04-13 LAB
ABO + RH BLD: NORMAL
ALBUMIN SERPL BCP-MCNC: 3.7 G/DL (ref 3.5–5.2)
ALP SERPL-CCNC: 62 U/L (ref 55–135)
ALT SERPL W/O P-5'-P-CCNC: 121 U/L (ref 10–44)
ANION GAP SERPL CALC-SCNC: 11 MMOL/L (ref 8–16)
ANISOCYTOSIS BLD QL SMEAR: SLIGHT
AST SERPL-CCNC: 56 U/L (ref 10–40)
BASOPHILS # BLD AUTO: 0.02 K/UL (ref 0–0.2)
BASOPHILS NFR BLD: 0.6 % (ref 0–1.9)
BILIRUB SERPL-MCNC: 0.4 MG/DL (ref 0.1–1)
BLD GP AB SCN CELLS X3 SERPL QL: NORMAL
BLD PROD TYP BPU: NORMAL
BLOOD UNIT EXPIRATION DATE: NORMAL
BLOOD UNIT TYPE CODE: 6200
BLOOD UNIT TYPE: NORMAL
BUN SERPL-MCNC: 35 MG/DL (ref 8–23)
CALCIUM SERPL-MCNC: 9.6 MG/DL (ref 8.7–10.5)
CHLORIDE SERPL-SCNC: 108 MMOL/L (ref 95–110)
CO2 SERPL-SCNC: 21 MMOL/L (ref 23–29)
CODING SYSTEM: NORMAL
CREAT SERPL-MCNC: 1.3 MG/DL (ref 0.5–1.4)
DIFFERENTIAL METHOD: ABNORMAL
DISPENSE STATUS: NORMAL
EOSINOPHIL # BLD AUTO: 0.1 K/UL (ref 0–0.5)
EOSINOPHIL NFR BLD: 3.9 % (ref 0–8)
ERYTHROCYTE [DISTWIDTH] IN BLOOD BY AUTOMATED COUNT: 18.6 % (ref 11.5–14.5)
EST. GFR  (AFRICAN AMERICAN): 48.4 ML/MIN/1.73 M^2
EST. GFR  (NON AFRICAN AMERICAN): 42 ML/MIN/1.73 M^2
GLUCOSE SERPL-MCNC: 118 MG/DL (ref 70–110)
HCT VFR BLD AUTO: 21.4 % (ref 37–48.5)
HGB BLD-MCNC: 6.7 G/DL (ref 12–16)
HYPOCHROMIA BLD QL SMEAR: ABNORMAL
IMM GRANULOCYTES # BLD AUTO: 0.02 K/UL (ref 0–0.04)
IMM GRANULOCYTES NFR BLD AUTO: 0.6 % (ref 0–0.5)
LYMPHOCYTES # BLD AUTO: 1.8 K/UL (ref 1–4.8)
LYMPHOCYTES NFR BLD: 52.8 % (ref 18–48)
MCH RBC QN AUTO: 30 PG (ref 27–31)
MCHC RBC AUTO-ENTMCNC: 31.3 G/DL (ref 32–36)
MCV RBC AUTO: 96 FL (ref 82–98)
MONOCYTES # BLD AUTO: 0.1 K/UL (ref 0.3–1)
MONOCYTES NFR BLD: 1.8 % (ref 4–15)
NEUTROPHILS # BLD AUTO: 1.4 K/UL (ref 1.8–7.7)
NEUTROPHILS NFR BLD: 40.3 % (ref 38–73)
NRBC BLD-RTO: 0 /100 WBC
NUM UNITS TRANS PACKED RBC: NORMAL
OVALOCYTES BLD QL SMEAR: ABNORMAL
PLATELET # BLD AUTO: 132 K/UL (ref 150–350)
PLATELET BLD QL SMEAR: ABNORMAL
PMV BLD AUTO: 11.6 FL (ref 9.2–12.9)
POIKILOCYTOSIS BLD QL SMEAR: SLIGHT
POLYCHROMASIA BLD QL SMEAR: ABNORMAL
POTASSIUM SERPL-SCNC: 4.9 MMOL/L (ref 3.5–5.1)
PROT SERPL-MCNC: 7.3 G/DL (ref 6–8.4)
RBC # BLD AUTO: 2.23 M/UL (ref 4–5.4)
SODIUM SERPL-SCNC: 140 MMOL/L (ref 136–145)
WBC # BLD AUTO: 3.35 K/UL (ref 3.9–12.7)

## 2020-04-13 PROCEDURE — 63600175 PHARM REV CODE 636 W HCPCS: Mod: HCNC | Performed by: INTERNAL MEDICINE

## 2020-04-13 PROCEDURE — 80053 COMPREHEN METABOLIC PANEL: CPT | Mod: HCNC

## 2020-04-13 PROCEDURE — P9040 RBC LEUKOREDUCED IRRADIATED: HCPCS | Mod: HCNC

## 2020-04-13 PROCEDURE — 86902 BLOOD TYPE ANTIGEN DONOR EA: CPT | Mod: HCNC

## 2020-04-13 PROCEDURE — 86922 COMPATIBILITY TEST ANTIGLOB: CPT | Mod: HCNC

## 2020-04-13 PROCEDURE — 25000003 PHARM REV CODE 250: Mod: HCNC | Performed by: INTERNAL MEDICINE

## 2020-04-13 PROCEDURE — 36430 TRANSFUSION BLD/BLD COMPNT: CPT | Mod: HCNC

## 2020-04-13 PROCEDURE — 36415 COLL VENOUS BLD VENIPUNCTURE: CPT | Mod: HCNC

## 2020-04-13 PROCEDURE — 85025 COMPLETE CBC W/AUTO DIFF WBC: CPT | Mod: HCNC

## 2020-04-13 PROCEDURE — 86850 RBC ANTIBODY SCREEN: CPT | Mod: HCNC

## 2020-04-13 RX ORDER — HYDROCODONE BITARTRATE AND ACETAMINOPHEN 500; 5 MG/1; MG/1
TABLET ORAL ONCE
Status: CANCELLED | OUTPATIENT
Start: 2020-04-13 | End: 2020-04-13

## 2020-04-13 RX ORDER — HEPARIN 100 UNIT/ML
5 SYRINGE INTRAVENOUS
Status: DISCONTINUED | OUTPATIENT
Start: 2020-04-13 | End: 2020-04-13 | Stop reason: HOSPADM

## 2020-04-13 RX ORDER — DIPHENHYDRAMINE HCL 25 MG
25 CAPSULE ORAL
Status: COMPLETED | OUTPATIENT
Start: 2020-04-13 | End: 2020-04-13

## 2020-04-13 RX ORDER — ACETAMINOPHEN 325 MG/1
650 TABLET ORAL ONCE
Status: COMPLETED | OUTPATIENT
Start: 2020-04-13 | End: 2020-04-13

## 2020-04-13 RX ORDER — DIPHENHYDRAMINE HCL 25 MG
25 CAPSULE ORAL
Status: CANCELLED | OUTPATIENT
Start: 2020-04-13

## 2020-04-13 RX ORDER — HYDROCODONE BITARTRATE AND ACETAMINOPHEN 500; 5 MG/1; MG/1
TABLET ORAL ONCE
Status: COMPLETED | OUTPATIENT
Start: 2020-04-13 | End: 2020-04-13

## 2020-04-13 RX ADMIN — ACETAMINOPHEN 650 MG: 325 TABLET ORAL at 04:04

## 2020-04-13 RX ADMIN — SODIUM CHLORIDE: 0.9 INJECTION, SOLUTION INTRAVENOUS at 04:04

## 2020-04-13 RX ADMIN — HEPARIN 500 UNITS: 100 SYRINGE at 06:04

## 2020-04-13 RX ADMIN — DIPHENHYDRAMINE HYDROCHLORIDE 25 MG: 25 CAPSULE ORAL at 04:04

## 2020-04-13 NOTE — PLAN OF CARE
Pt handed off from Amira Caldera RN, at 1830. Pt completed 1 unit of blood and tolerated well, with no complications. VS stable. Right chest port positive for blood return, flushed with normal saline and heparin and de-accessed prior to discharge. Band-aid applied to site. RTC 4/20 for labs and possible blood transfusion. Pt discharged with NAD, ambulating independently with rollator.

## 2020-04-16 DIAGNOSIS — Z13.9 SCREENING FOR CONDITION: Primary | ICD-10-CM

## 2020-04-16 NOTE — PROGRESS NOTES
04/16/2020      In an effort to protect our immunocompromised patients from potential exposure to COVID-19, [facility] will now require all patients receiving an infusion, an injection, and/or radiation therapy to be tested for COVID-19 prior to their appointment.  All patients currently under treatment will be tested immediately, and patients initiating new treatment cycles or with one-time appointments (injections, transfusions, etc.) must be tested within 72 hours of their appointment.     Placed COVID-19 test order for patient.  A member of our team is to contact the patient in the near future to explain this process and the rationale behind it, to ask the COVID-19 screening questions, and to get the patient scheduled for their COVID-19 test.     The above was completed in accordance with instructions and guidelines set forth by [facility].     Signed,    Frantz Wong, ALEA     Date:  04/16/2020

## 2020-04-17 ENCOUNTER — TELEPHONE (OUTPATIENT)
Dept: HEMATOLOGY/ONCOLOGY | Facility: CLINIC | Age: 70
End: 2020-04-17

## 2020-04-17 NOTE — TELEPHONE ENCOUNTER
----- Message from Kamar Castillo sent at 4/17/2020  7:02 AM CDT -----  Appointment Request From: Susan Puente    With Provider: Oncology dept    Preferred Date Range: 4/17/2020 - 4/29/2020    Preferred Times: Any time    Reason for visit: COVID-19 testing    Comments:  Drive by corona virus test

## 2020-04-17 NOTE — TELEPHONE ENCOUNTER
04/17/2020      Phoned the patient.      Discussed the following with the patient:  In an effort to protect our immunocompromised patients from potential exposure to COVID-19, [facility] will now require all patients receiving an infusion, an injection, and/or radiation therapy to be tested for COVID-19 prior to their appointment.  All patients currently under treatment will be tested immediately, and patients initiating new treatment cycles or with one-time appointments (injections, transfusions, etc.) must be tested within 72 hours of their appointment.      Symptom Patient's Response   Fever YES/NO: no   Cough YES/NO: no   Shortness of breath  YES/NO: no   Difficulty breathing YES/NO: no   GI symptoms such as diarrhea or nausea YES/NO: no   Loss of taste YES/NO: no   Loss of smell YES/NO: no     Other Screening Patient's Response   Has the patient previously undergone COVID-19 testing? YES/NO: no     If yes to the question directly above, what was the result? not applicable   Has the patient been in close contact with someone who has undergone COVID-19 testing? YES/NO: no     If yes to the question directly above, what was the result? not applicable      The patient's 24-hour COVID-19 test was ordered and scheduled.  Reviewed the appointment date, time, and location with the patient.      Advised the patient that she can expect the results to take approximately 24 hours and that someone will reach out to her regarding her results.  Advised the patient that her treatment appointment will be rescheduled if she tests positive for COVID-19.      Questions were answered to the patient's satisfaction, and the patient verbalized understanding of information and agreement with the plan.       The above was completed in accordance with instructions and guidelines set forth by [facility].     Alem Bell     Date:  04/17/2020

## 2020-04-18 ENCOUNTER — LAB VISIT (OUTPATIENT)
Dept: INTERNAL MEDICINE | Facility: CLINIC | Age: 70
End: 2020-04-18
Payer: MEDICARE

## 2020-04-18 DIAGNOSIS — Z13.9 SCREENING FOR CONDITION: ICD-10-CM

## 2020-04-18 LAB — SARS-COV-2 RNA RESP QL NAA+PROBE: NOT DETECTED

## 2020-04-18 PROCEDURE — U0002 COVID-19 LAB TEST NON-CDC: HCPCS | Mod: HCNC

## 2020-04-20 ENCOUNTER — INFUSION (OUTPATIENT)
Dept: INFUSION THERAPY | Facility: HOSPITAL | Age: 70
End: 2020-04-20
Attending: INTERNAL MEDICINE
Payer: MEDICARE

## 2020-04-20 VITALS
RESPIRATION RATE: 18 BRPM | TEMPERATURE: 98 F | DIASTOLIC BLOOD PRESSURE: 57 MMHG | SYSTOLIC BLOOD PRESSURE: 125 MMHG | HEART RATE: 67 BPM

## 2020-04-20 DIAGNOSIS — D63.0 ANEMIA IN NEOPLASTIC DISEASE: Primary | ICD-10-CM

## 2020-04-20 DIAGNOSIS — D63.0 ANEMIA IN NEOPLASTIC DISEASE: ICD-10-CM

## 2020-04-20 PROCEDURE — 96374 THER/PROPH/DIAG INJ IV PUSH: CPT | Mod: HCNC

## 2020-04-20 PROCEDURE — 36430 TRANSFUSION BLD/BLD COMPNT: CPT | Mod: HCNC

## 2020-04-20 PROCEDURE — 86644 CMV ANTIBODY: CPT | Mod: HCNC

## 2020-04-20 PROCEDURE — 86922 COMPATIBILITY TEST ANTIGLOB: CPT | Mod: HCNC

## 2020-04-20 PROCEDURE — P9040 RBC LEUKOREDUCED IRRADIATED: HCPCS | Mod: HCNC

## 2020-04-20 PROCEDURE — 63600175 PHARM REV CODE 636 W HCPCS: Mod: HCNC | Performed by: INTERNAL MEDICINE

## 2020-04-20 PROCEDURE — 25000003 PHARM REV CODE 250: Mod: HCNC | Performed by: INTERNAL MEDICINE

## 2020-04-20 RX ORDER — HYDROCODONE BITARTRATE AND ACETAMINOPHEN 500; 5 MG/1; MG/1
TABLET ORAL ONCE
Status: COMPLETED | OUTPATIENT
Start: 2020-04-20 | End: 2020-04-20

## 2020-04-20 RX ORDER — DIPHENHYDRAMINE HYDROCHLORIDE 50 MG/ML
25 INJECTION INTRAMUSCULAR; INTRAVENOUS
Status: COMPLETED | OUTPATIENT
Start: 2020-04-20 | End: 2020-04-20

## 2020-04-20 RX ORDER — DIPHENHYDRAMINE HYDROCHLORIDE 50 MG/ML
25 INJECTION INTRAMUSCULAR; INTRAVENOUS
Status: CANCELLED | OUTPATIENT
Start: 2020-04-20

## 2020-04-20 RX ORDER — ACETAMINOPHEN 325 MG/1
650 TABLET ORAL
Status: CANCELLED | OUTPATIENT
Start: 2020-04-20

## 2020-04-20 RX ORDER — HYDROCODONE BITARTRATE AND ACETAMINOPHEN 500; 5 MG/1; MG/1
TABLET ORAL ONCE
Status: CANCELLED | OUTPATIENT
Start: 2020-04-20 | End: 2020-04-20

## 2020-04-20 RX ORDER — HEPARIN 100 UNIT/ML
500 SYRINGE INTRAVENOUS
Status: COMPLETED | OUTPATIENT
Start: 2020-04-20 | End: 2020-04-20

## 2020-04-20 RX ORDER — ACETAMINOPHEN 325 MG/1
650 TABLET ORAL
Status: COMPLETED | OUTPATIENT
Start: 2020-04-20 | End: 2020-04-20

## 2020-04-20 RX ADMIN — HEPARIN 500 UNITS: 100 SYRINGE at 05:04

## 2020-04-20 RX ADMIN — SODIUM CHLORIDE: 0.9 INJECTION, SOLUTION INTRAVENOUS at 03:04

## 2020-04-20 RX ADMIN — ACETAMINOPHEN 650 MG: 325 TABLET ORAL at 03:04

## 2020-04-20 RX ADMIN — DIPHENHYDRAMINE HYDROCHLORIDE 25 MG: 50 INJECTION, SOLUTION INTRAMUSCULAR; INTRAVENOUS at 03:04

## 2020-04-20 NOTE — PROGRESS NOTES
To:  Fabiola Taylor, RN    [facility]-  Please phone this patient with the negative COVID-19 test results following the scripting and protocol we have reviewed.  If my assistance is needed, don't hesitate to reach out.  Thanks!  -Frantz Wong, DNP, APRN, FNP-BC, AOCNP  [facility], 3rd Floor  [facility] [address], [place]  [address]  [phone]

## 2020-04-20 NOTE — PLAN OF CARE
Pt received one unit of blood; tolerated well. VSS and NAD. Pt instructed to call MD with any concerns. Pt discharged home independently.       Problem: Adult Inpatient Plan of Care  Goal: Plan of Care Review  Outcome: Ongoing, Progressing     Problem: Anemia  Goal: Anemia Symptom Improvement  Outcome: Ongoing, Progressing

## 2020-04-22 ENCOUNTER — TELEPHONE (OUTPATIENT)
Dept: HEMATOLOGY/ONCOLOGY | Facility: CLINIC | Age: 70
End: 2020-04-22

## 2020-04-22 NOTE — TELEPHONE ENCOUNTER
----- Message from Frantz Wong DNP sent at 4/22/2020 12:48 PM CDT -----  To:  Staff of Dr. Valdes:    Please phone this patient to let her know that her COVID-19 test came back negative and that, based on that result, she can proceed with treatment as indicated by her treatment provider.  If my assistance is needed, don't hesitate to reach out.      Thanks,  Frantz Wong DNP, APRN, FNP-BC, AOCNP  [facility], 3rd Floor  [facility] [address], [place]  [address]  [phone]

## 2020-04-22 NOTE — PROGRESS NOTES
To:  Staff of Dr. Valdes:    Please phone this patient to let her know that her COVID-19 test came back negative and that, based on that result, she can proceed with treatment as indicated by her treatment provider.  If my assistance is needed, don't hesitate to reach out.      Thanks,  Frantz Wong, DNP, APRN, FNP-BC, AOCNP  [facility], 3rd Floor  [facility] [address], [place]  [address]  [phone]

## 2020-04-24 ENCOUNTER — TELEPHONE (OUTPATIENT)
Dept: HEMATOLOGY/ONCOLOGY | Facility: CLINIC | Age: 70
End: 2020-04-24

## 2020-04-27 ENCOUNTER — INFUSION (OUTPATIENT)
Dept: INFUSION THERAPY | Facility: HOSPITAL | Age: 70
End: 2020-04-27
Attending: INTERNAL MEDICINE
Payer: MEDICARE

## 2020-04-27 VITALS
BODY MASS INDEX: 29.97 KG/M2 | WEIGHT: 179.88 LBS | HEIGHT: 65 IN | DIASTOLIC BLOOD PRESSURE: 72 MMHG | HEART RATE: 83 BPM | SYSTOLIC BLOOD PRESSURE: 151 MMHG | TEMPERATURE: 98 F | RESPIRATION RATE: 18 BRPM

## 2020-04-27 DIAGNOSIS — D46.C MDS (MYELODYSPLASTIC SYNDROME) WITH 5Q DELETION: Primary | ICD-10-CM

## 2020-04-27 PROCEDURE — 96401 CHEMO ANTI-NEOPL SQ/IM: CPT | Mod: HCNC

## 2020-04-27 PROCEDURE — 63600175 PHARM REV CODE 636 W HCPCS: Mod: JG,HCNC | Performed by: INTERNAL MEDICINE

## 2020-04-27 RX ORDER — AZACITIDINE 100 MG/1
75 INJECTION, POWDER, LYOPHILIZED, FOR SOLUTION INTRAVENOUS; SUBCUTANEOUS
Status: COMPLETED | OUTPATIENT
Start: 2020-04-27 | End: 2020-04-27

## 2020-04-27 RX ORDER — AZACITIDINE 100 MG/1
75 INJECTION, POWDER, LYOPHILIZED, FOR SOLUTION INTRAVENOUS; SUBCUTANEOUS
Status: CANCELLED | OUTPATIENT
Start: 2020-05-27

## 2020-04-27 RX ORDER — ONDANSETRON 2 MG/ML
8 INJECTION INTRAMUSCULAR; INTRAVENOUS
Status: CANCELLED | OUTPATIENT
Start: 2020-05-26

## 2020-04-27 RX ORDER — AZACITIDINE 100 MG/1
75 INJECTION, POWDER, LYOPHILIZED, FOR SOLUTION INTRAVENOUS; SUBCUTANEOUS
Status: CANCELLED | OUTPATIENT
Start: 2020-05-26

## 2020-04-27 RX ORDER — AZACITIDINE 100 MG/1
75 INJECTION, POWDER, LYOPHILIZED, FOR SOLUTION INTRAVENOUS; SUBCUTANEOUS
Status: CANCELLED | OUTPATIENT
Start: 2020-05-29

## 2020-04-27 RX ORDER — ONDANSETRON 2 MG/ML
8 INJECTION INTRAMUSCULAR; INTRAVENOUS
Status: CANCELLED | OUTPATIENT
Start: 2020-05-30

## 2020-04-27 RX ORDER — AZACITIDINE 100 MG/1
75 INJECTION, POWDER, LYOPHILIZED, FOR SOLUTION INTRAVENOUS; SUBCUTANEOUS
Status: CANCELLED | OUTPATIENT
Start: 2020-05-25

## 2020-04-27 RX ORDER — ONDANSETRON 2 MG/ML
8 INJECTION INTRAMUSCULAR; INTRAVENOUS
Status: CANCELLED | OUTPATIENT
Start: 2020-05-27

## 2020-04-27 RX ORDER — ONDANSETRON 2 MG/ML
8 INJECTION INTRAMUSCULAR; INTRAVENOUS
Status: CANCELLED
Start: 2020-05-28

## 2020-04-27 RX ORDER — ONDANSETRON 2 MG/ML
8 INJECTION INTRAMUSCULAR; INTRAVENOUS
Status: CANCELLED | OUTPATIENT
Start: 2020-05-29

## 2020-04-27 RX ORDER — AZACITIDINE 100 MG/1
75 INJECTION, POWDER, LYOPHILIZED, FOR SOLUTION INTRAVENOUS; SUBCUTANEOUS
Status: CANCELLED | OUTPATIENT
Start: 2020-05-28

## 2020-04-27 RX ADMIN — AZACITIDINE 150 MG: 100 INJECTION, POWDER, LYOPHILIZED, FOR SOLUTION INTRAVENOUS; SUBCUTANEOUS at 01:04

## 2020-04-27 NOTE — PLAN OF CARE
Patient tolerated vidaza injection to abdomen well today. No need for blood per Dr. Valdes. H/H 7.7 & 24.6. NAD noted upon discharge. Plan to rtc tomorrow for D2 vidaza injections. Verbalized understanding to call MD for any questions/concerns. Discharged home, ambulated independently using walker.

## 2020-04-27 NOTE — PLAN OF CARE
Problem: Adult Inpatient Plan of Care  Goal: Optimal Comfort and Wellbeing  Intervention: Provide Person-Centered Care  Flowsheets (Taken 4/27/2020 1403)  Trust Relationship/Rapport: questions encouraged; care explained; choices provided; reassurance provided; emotional support provided; thoughts/feelings acknowledged; questions answered; empathic listening provided

## 2020-04-28 ENCOUNTER — INFUSION (OUTPATIENT)
Dept: INFUSION THERAPY | Facility: HOSPITAL | Age: 70
End: 2020-04-28
Attending: INTERNAL MEDICINE
Payer: MEDICARE

## 2020-04-28 VITALS — TEMPERATURE: 99 F | SYSTOLIC BLOOD PRESSURE: 151 MMHG | HEART RATE: 85 BPM | DIASTOLIC BLOOD PRESSURE: 69 MMHG

## 2020-04-28 DIAGNOSIS — E87.6 HYPOKALEMIA: ICD-10-CM

## 2020-04-28 DIAGNOSIS — D46.C MDS (MYELODYSPLASTIC SYNDROME) WITH 5Q DELETION: Primary | ICD-10-CM

## 2020-04-28 PROCEDURE — 63600175 PHARM REV CODE 636 W HCPCS: Mod: JG,HCNC | Performed by: INTERNAL MEDICINE

## 2020-04-28 PROCEDURE — 96401 CHEMO ANTI-NEOPL SQ/IM: CPT | Mod: HCNC

## 2020-04-28 RX ORDER — ONDANSETRON 4 MG/1
8 TABLET, ORALLY DISINTEGRATING ORAL ONCE
Status: CANCELLED
Start: 2020-05-28

## 2020-04-28 RX ORDER — ONDANSETRON 4 MG/1
8 TABLET, ORALLY DISINTEGRATING ORAL ONCE
Status: CANCELLED
Start: 2020-05-27

## 2020-04-28 RX ORDER — ONDANSETRON 2 MG/ML
8 INJECTION INTRAMUSCULAR; INTRAVENOUS
Status: DISCONTINUED | OUTPATIENT
Start: 2020-04-28 | End: 2020-04-28 | Stop reason: HOSPADM

## 2020-04-28 RX ORDER — ONDANSETRON 4 MG/1
8 TABLET, ORALLY DISINTEGRATING ORAL ONCE
Status: DISCONTINUED | OUTPATIENT
Start: 2020-04-28 | End: 2020-04-28 | Stop reason: HOSPADM

## 2020-04-28 RX ORDER — ONDANSETRON 4 MG/1
8 TABLET, ORALLY DISINTEGRATING ORAL ONCE
Status: CANCELLED
Start: 2020-05-01

## 2020-04-28 RX ORDER — ONDANSETRON 4 MG/1
8 TABLET, ORALLY DISINTEGRATING ORAL ONCE
Status: CANCELLED
Start: 2020-04-30

## 2020-04-28 RX ORDER — ONDANSETRON 4 MG/1
8 TABLET, ORALLY DISINTEGRATING ORAL ONCE
Status: CANCELLED
Start: 2020-05-02

## 2020-04-28 RX ORDER — AZACITIDINE 100 MG/1
75 INJECTION, POWDER, LYOPHILIZED, FOR SOLUTION INTRAVENOUS; SUBCUTANEOUS
Status: DISCONTINUED | OUTPATIENT
Start: 2020-04-28 | End: 2020-04-28

## 2020-04-28 RX ORDER — ONDANSETRON 4 MG/1
8 TABLET, ORALLY DISINTEGRATING ORAL ONCE
Status: CANCELLED
Start: 2020-05-29

## 2020-04-28 RX ORDER — ONDANSETRON 4 MG/1
8 TABLET, ORALLY DISINTEGRATING ORAL ONCE
Status: CANCELLED
Start: 2020-05-26

## 2020-04-28 RX ORDER — ONDANSETRON 4 MG/1
8 TABLET, ORALLY DISINTEGRATING ORAL ONCE
Status: CANCELLED
Start: 2020-05-25

## 2020-04-28 RX ADMIN — AZACITIDINE 150 MG: 100 INJECTION, POWDER, LYOPHILIZED, FOR SOLUTION INTRAVENOUS; SUBCUTANEOUS at 02:04

## 2020-04-29 ENCOUNTER — INFUSION (OUTPATIENT)
Dept: INFUSION THERAPY | Facility: HOSPITAL | Age: 70
End: 2020-04-29
Payer: MEDICARE

## 2020-04-29 VITALS
DIASTOLIC BLOOD PRESSURE: 60 MMHG | RESPIRATION RATE: 18 BRPM | TEMPERATURE: 100 F | HEART RATE: 92 BPM | SYSTOLIC BLOOD PRESSURE: 124 MMHG

## 2020-04-29 DIAGNOSIS — D46.C MDS (MYELODYSPLASTIC SYNDROME) WITH 5Q DELETION: Primary | ICD-10-CM

## 2020-04-29 PROCEDURE — 63600175 PHARM REV CODE 636 W HCPCS: Mod: JG,HCNC | Performed by: INTERNAL MEDICINE

## 2020-04-29 PROCEDURE — 96401 CHEMO ANTI-NEOPL SQ/IM: CPT | Mod: HCNC

## 2020-04-29 RX ORDER — AZACITIDINE 100 MG/1
75 INJECTION, POWDER, LYOPHILIZED, FOR SOLUTION INTRAVENOUS; SUBCUTANEOUS
Status: COMPLETED | OUTPATIENT
Start: 2020-04-29 | End: 2020-04-29

## 2020-04-29 RX ADMIN — AZACITIDINE 150 MG: 100 INJECTION, POWDER, LYOPHILIZED, FOR SOLUTION INTRAVENOUS; SUBCUTANEOUS at 01:04

## 2020-04-30 ENCOUNTER — INFUSION (OUTPATIENT)
Dept: INFUSION THERAPY | Facility: HOSPITAL | Age: 70
End: 2020-04-30
Payer: MEDICARE

## 2020-04-30 VITALS
HEART RATE: 92 BPM | RESPIRATION RATE: 18 BRPM | DIASTOLIC BLOOD PRESSURE: 62 MMHG | SYSTOLIC BLOOD PRESSURE: 140 MMHG | TEMPERATURE: 98 F

## 2020-04-30 DIAGNOSIS — E87.6 HYPOKALEMIA: ICD-10-CM

## 2020-04-30 DIAGNOSIS — D46.C MDS (MYELODYSPLASTIC SYNDROME) WITH 5Q DELETION: Primary | ICD-10-CM

## 2020-04-30 PROCEDURE — 63600175 PHARM REV CODE 636 W HCPCS: Mod: JG,HCNC | Performed by: INTERNAL MEDICINE

## 2020-04-30 PROCEDURE — 96401 CHEMO ANTI-NEOPL SQ/IM: CPT | Mod: HCNC

## 2020-04-30 RX ORDER — ONDANSETRON 4 MG/1
8 TABLET, ORALLY DISINTEGRATING ORAL ONCE
Status: DISCONTINUED | OUTPATIENT
Start: 2020-04-30 | End: 2020-04-30 | Stop reason: HOSPADM

## 2020-04-30 RX ORDER — AZACITIDINE 100 MG/1
75 INJECTION, POWDER, LYOPHILIZED, FOR SOLUTION INTRAVENOUS; SUBCUTANEOUS
Status: COMPLETED | OUTPATIENT
Start: 2020-04-30 | End: 2020-04-30

## 2020-04-30 RX ADMIN — AZACITIDINE 150 MG: 100 INJECTION, POWDER, LYOPHILIZED, FOR SOLUTION INTRAVENOUS; SUBCUTANEOUS at 02:04

## 2020-05-01 ENCOUNTER — INFUSION (OUTPATIENT)
Dept: INFUSION THERAPY | Facility: HOSPITAL | Age: 70
End: 2020-05-01
Attending: INTERNAL MEDICINE
Payer: MEDICARE

## 2020-05-01 VITALS — DIASTOLIC BLOOD PRESSURE: 61 MMHG | SYSTOLIC BLOOD PRESSURE: 124 MMHG | RESPIRATION RATE: 18 BRPM | HEART RATE: 93 BPM

## 2020-05-01 DIAGNOSIS — D46.C MDS (MYELODYSPLASTIC SYNDROME) WITH 5Q DELETION: Primary | ICD-10-CM

## 2020-05-01 DIAGNOSIS — E87.6 HYPOKALEMIA: ICD-10-CM

## 2020-05-01 PROCEDURE — 63600175 PHARM REV CODE 636 W HCPCS: Mod: JG,HCNC | Performed by: INTERNAL MEDICINE

## 2020-05-01 PROCEDURE — 96401 CHEMO ANTI-NEOPL SQ/IM: CPT | Mod: HCNC

## 2020-05-01 RX ORDER — ONDANSETRON 4 MG/1
8 TABLET, ORALLY DISINTEGRATING ORAL ONCE
Status: DISCONTINUED | OUTPATIENT
Start: 2020-05-01 | End: 2020-05-01 | Stop reason: HOSPADM

## 2020-05-01 RX ORDER — AZACITIDINE 100 MG/1
75 INJECTION, POWDER, LYOPHILIZED, FOR SOLUTION INTRAVENOUS; SUBCUTANEOUS
Status: COMPLETED | OUTPATIENT
Start: 2020-05-01 | End: 2020-05-01

## 2020-05-01 RX ADMIN — AZACITIDINE 150 MG: 100 INJECTION, POWDER, LYOPHILIZED, FOR SOLUTION INTRAVENOUS; SUBCUTANEOUS at 01:05

## 2020-05-01 NOTE — NURSING
1345- Patient arrived ambulatory to the unit for last day of cycle.  Patient has no new complaints and shows no signs of distress.  Patient tolerated injections without issue and was discharged to home setting.  Patient knows to contact MD office with any issues or concerns.

## 2020-05-04 ENCOUNTER — TELEPHONE (OUTPATIENT)
Dept: HEMATOLOGY/ONCOLOGY | Facility: CLINIC | Age: 70
End: 2020-05-04

## 2020-05-04 NOTE — TELEPHONE ENCOUNTER
----- Message from Asmita Jameson sent at 5/4/2020 11:38 AM CDT -----  Contact: pt  Reason: Calling to speak with nurse pertaining to blood work appts    Communication: [phone]

## 2020-05-05 ENCOUNTER — LAB VISIT (OUTPATIENT)
Dept: LAB | Facility: HOSPITAL | Age: 70
End: 2020-05-05
Payer: MEDICARE

## 2020-05-05 DIAGNOSIS — D46.C MDS (MYELODYSPLASTIC SYNDROME) WITH 5Q DELETION: ICD-10-CM

## 2020-05-05 LAB
ABO + RH BLD: NORMAL
ALBUMIN SERPL BCP-MCNC: 3.8 G/DL (ref 3.5–5.2)
ALP SERPL-CCNC: 57 U/L (ref 55–135)
ALT SERPL W/O P-5'-P-CCNC: 50 U/L (ref 10–44)
ANION GAP SERPL CALC-SCNC: 13 MMOL/L (ref 8–16)
ANISOCYTOSIS BLD QL SMEAR: SLIGHT
AST SERPL-CCNC: 37 U/L (ref 10–40)
BASOPHILS # BLD AUTO: 0.01 K/UL (ref 0–0.2)
BASOPHILS NFR BLD: 0.5 % (ref 0–1.9)
BILIRUB SERPL-MCNC: 0.4 MG/DL (ref 0.1–1)
BLD GP AB SCN CELLS X3 SERPL QL: NORMAL
BLOOD GROUP ANTIBODIES SERPL: NORMAL
BUN SERPL-MCNC: 30 MG/DL (ref 8–23)
CALCIUM SERPL-MCNC: 9.7 MG/DL (ref 8.7–10.5)
CHLORIDE SERPL-SCNC: 101 MMOL/L (ref 95–110)
CO2 SERPL-SCNC: 27 MMOL/L (ref 23–29)
CREAT SERPL-MCNC: 1.4 MG/DL (ref 0.5–1.4)
DIFFERENTIAL METHOD: ABNORMAL
EOSINOPHIL # BLD AUTO: 0.1 K/UL (ref 0–0.5)
EOSINOPHIL NFR BLD: 3.2 % (ref 0–8)
ERYTHROCYTE [DISTWIDTH] IN BLOOD BY AUTOMATED COUNT: 17.3 % (ref 11.5–14.5)
EST. GFR  (AFRICAN AMERICAN): 44.2 ML/MIN/1.73 M^2
EST. GFR  (NON AFRICAN AMERICAN): 38.4 ML/MIN/1.73 M^2
GLUCOSE SERPL-MCNC: 116 MG/DL (ref 70–110)
HCT VFR BLD AUTO: 20.5 % (ref 37–48.5)
HGB BLD-MCNC: 6.5 G/DL (ref 12–16)
IMM GRANULOCYTES # BLD AUTO: 0.02 K/UL (ref 0–0.04)
IMM GRANULOCYTES NFR BLD AUTO: 0.9 % (ref 0–0.5)
LYMPHOCYTES # BLD AUTO: 0.9 K/UL (ref 1–4.8)
LYMPHOCYTES NFR BLD: 41.7 % (ref 18–48)
MCH RBC QN AUTO: 30.1 PG (ref 27–31)
MCHC RBC AUTO-ENTMCNC: 31.7 G/DL (ref 32–36)
MCV RBC AUTO: 95 FL (ref 82–98)
MONOCYTES # BLD AUTO: 0.1 K/UL (ref 0.3–1)
MONOCYTES NFR BLD: 2.8 % (ref 4–15)
NEUTROPHILS # BLD AUTO: 1.1 K/UL (ref 1.8–7.7)
NEUTROPHILS NFR BLD: 50.9 % (ref 38–73)
NRBC BLD-RTO: 0 /100 WBC
OVALOCYTES BLD QL SMEAR: ABNORMAL
PLATELET # BLD AUTO: 126 K/UL (ref 150–350)
PLATELET BLD QL SMEAR: ABNORMAL
PMV BLD AUTO: 10.9 FL (ref 9.2–12.9)
POIKILOCYTOSIS BLD QL SMEAR: SLIGHT
POTASSIUM SERPL-SCNC: 4.1 MMOL/L (ref 3.5–5.1)
PROT SERPL-MCNC: 7.1 G/DL (ref 6–8.4)
RBC # BLD AUTO: 2.16 M/UL (ref 4–5.4)
SODIUM SERPL-SCNC: 141 MMOL/L (ref 136–145)
WBC # BLD AUTO: 2.16 K/UL (ref 3.9–12.7)

## 2020-05-05 PROCEDURE — 80053 COMPREHEN METABOLIC PANEL: CPT | Mod: HCNC

## 2020-05-05 PROCEDURE — 85025 COMPLETE CBC W/AUTO DIFF WBC: CPT | Mod: HCNC

## 2020-05-05 PROCEDURE — 86870 RBC ANTIBODY IDENTIFICATION: CPT | Mod: HCNC

## 2020-05-05 PROCEDURE — 86901 BLOOD TYPING SEROLOGIC RH(D): CPT | Mod: HCNC

## 2020-05-05 PROCEDURE — 36415 COLL VENOUS BLD VENIPUNCTURE: CPT | Mod: HCNC

## 2020-05-07 ENCOUNTER — TELEPHONE (OUTPATIENT)
Dept: HEMATOLOGY/ONCOLOGY | Facility: CLINIC | Age: 70
End: 2020-05-07

## 2020-05-07 DIAGNOSIS — D61.818 PANCYTOPENIA: ICD-10-CM

## 2020-05-07 DIAGNOSIS — D46.9 MDS (MYELODYSPLASTIC SYNDROME): Primary | ICD-10-CM

## 2020-05-07 RX ORDER — ACETAMINOPHEN 325 MG/1
650 TABLET ORAL
Status: CANCELLED | OUTPATIENT
Start: 2020-05-07

## 2020-05-07 RX ORDER — DIPHENHYDRAMINE HCL 25 MG
25 CAPSULE ORAL
Status: CANCELLED | OUTPATIENT
Start: 2020-05-07

## 2020-05-07 RX ORDER — HYDROCODONE BITARTRATE AND ACETAMINOPHEN 500; 5 MG/1; MG/1
TABLET ORAL ONCE
Status: CANCELLED | OUTPATIENT
Start: 2020-05-07 | End: 2020-05-07

## 2020-05-07 NOTE — TELEPHONE ENCOUNTER
"----- Message from Vanesa Dieter sent at 5/7/2020 11:34 AM CDT -----  Contact: Susan   Consult/Advisory:    Name Of Caller: Susan   Provider Name: Dr. Valdes   Does patient feel the need to be seen today? No   Relationship to the Pt?: Self   Contact Preference?: [phone]  What is the nature of the call?:  Patient request callback to discuss lab results and what's up next     Additional Notes:  "Thank you for all that you do for our patients'"      "

## 2020-05-07 NOTE — PROGRESS NOTES
Ordered 1 unit prbc to be transfused in infusion clinic for hgb of 6.5.  Altagracia Cornejo, ANDRYP  Hematology/Oncology/Bone Marrow Transplant

## 2020-05-07 NOTE — TELEPHONE ENCOUNTER
Called patient to schedule for blood transfusion/covid swab tomorrow, set up for weekly labs on mondays

## 2020-05-08 ENCOUNTER — INFUSION (OUTPATIENT)
Dept: INFUSION THERAPY | Facility: HOSPITAL | Age: 70
End: 2020-05-08
Attending: INTERNAL MEDICINE
Payer: MEDICARE

## 2020-05-08 ENCOUNTER — CLINICAL SUPPORT (OUTPATIENT)
Dept: HEMATOLOGY/ONCOLOGY | Facility: CLINIC | Age: 70
End: 2020-05-08
Payer: MEDICARE

## 2020-05-08 VITALS
SYSTOLIC BLOOD PRESSURE: 136 MMHG | RESPIRATION RATE: 18 BRPM | TEMPERATURE: 98 F | WEIGHT: 174.63 LBS | HEIGHT: 66 IN | DIASTOLIC BLOOD PRESSURE: 73 MMHG | BODY MASS INDEX: 28.06 KG/M2 | HEART RATE: 75 BPM

## 2020-05-08 DIAGNOSIS — D46.9 MDS (MYELODYSPLASTIC SYNDROME): Primary | ICD-10-CM

## 2020-05-08 DIAGNOSIS — Z51.11 CHEMOTHERAPY MANAGEMENT, ENCOUNTER FOR: Primary | ICD-10-CM

## 2020-05-08 DIAGNOSIS — Z13.9 ENCOUNTER FOR SCREENING: ICD-10-CM

## 2020-05-08 DIAGNOSIS — D61.818 PANCYTOPENIA: ICD-10-CM

## 2020-05-08 LAB
BLD PROD TYP BPU: NORMAL
BLOOD UNIT EXPIRATION DATE: NORMAL
BLOOD UNIT TYPE CODE: 6200
BLOOD UNIT TYPE: NORMAL
CODING SYSTEM: NORMAL
DISPENSE STATUS: NORMAL
NUM UNITS TRANS PACKED RBC: NORMAL
SARS-COV-2 RDRP RESP QL NAA+PROBE: NEGATIVE

## 2020-05-08 PROCEDURE — P9040 RBC LEUKOREDUCED IRRADIATED: HCPCS | Mod: HCNC

## 2020-05-08 PROCEDURE — 86922 COMPATIBILITY TEST ANTIGLOB: CPT | Mod: HCNC

## 2020-05-08 PROCEDURE — U0002 COVID-19 LAB TEST NON-CDC: HCPCS | Mod: HCNC

## 2020-05-08 PROCEDURE — 36430 TRANSFUSION BLD/BLD COMPNT: CPT | Mod: HCNC

## 2020-05-08 PROCEDURE — 63600175 PHARM REV CODE 636 W HCPCS: Mod: HCNC | Performed by: INTERNAL MEDICINE

## 2020-05-08 PROCEDURE — 25000003 PHARM REV CODE 250: Mod: HCNC | Performed by: INTERNAL MEDICINE

## 2020-05-08 PROCEDURE — A4216 STERILE WATER/SALINE, 10 ML: HCPCS | Mod: HCNC | Performed by: INTERNAL MEDICINE

## 2020-05-08 PROCEDURE — 25000003 PHARM REV CODE 250: Mod: HCNC | Performed by: NURSE PRACTITIONER

## 2020-05-08 RX ORDER — HYDROCODONE BITARTRATE AND ACETAMINOPHEN 500; 5 MG/1; MG/1
TABLET ORAL ONCE
Status: COMPLETED | OUTPATIENT
Start: 2020-05-08 | End: 2020-05-08

## 2020-05-08 RX ORDER — SODIUM CHLORIDE 0.9 % (FLUSH) 0.9 %
10 SYRINGE (ML) INJECTION
Status: COMPLETED | OUTPATIENT
Start: 2020-05-08 | End: 2020-05-08

## 2020-05-08 RX ORDER — HEPARIN 100 UNIT/ML
500 SYRINGE INTRAVENOUS
Status: CANCELLED | OUTPATIENT
Start: 2020-05-08

## 2020-05-08 RX ORDER — HEPARIN 100 UNIT/ML
500 SYRINGE INTRAVENOUS
Status: COMPLETED | OUTPATIENT
Start: 2020-05-08 | End: 2020-05-08

## 2020-05-08 RX ORDER — SODIUM CHLORIDE 0.9 % (FLUSH) 0.9 %
10 SYRINGE (ML) INJECTION
Status: CANCELLED | OUTPATIENT
Start: 2020-05-08

## 2020-05-08 RX ORDER — ACETAMINOPHEN 325 MG/1
650 TABLET ORAL
Status: COMPLETED | OUTPATIENT
Start: 2020-05-08 | End: 2020-05-08

## 2020-05-08 RX ORDER — DIPHENHYDRAMINE HCL 25 MG
25 CAPSULE ORAL
Status: COMPLETED | OUTPATIENT
Start: 2020-05-08 | End: 2020-05-08

## 2020-05-08 RX ADMIN — SODIUM CHLORIDE: 0.9 INJECTION, SOLUTION INTRAVENOUS at 11:05

## 2020-05-08 RX ADMIN — ACETAMINOPHEN 650 MG: 325 TABLET ORAL at 11:05

## 2020-05-08 RX ADMIN — Medication 10 ML: at 02:05

## 2020-05-08 RX ADMIN — DIPHENHYDRAMINE HYDROCHLORIDE 25 MG: 25 CAPSULE ORAL at 11:05

## 2020-05-08 RX ADMIN — HEPARIN 500 UNITS: 100 SYRINGE at 02:05

## 2020-05-08 NOTE — PROGRESS NOTES
The patient was notified of this result via message, and this result and accompanying note were forwarded to the patient's hematology/oncology team.  Of note, the patient has checked into her infusion appointment for today, per Peerius.

## 2020-05-08 NOTE — NURSING
1140  Pt here for 1 unit PRBC, reports nausea, vomiting, diarrhea past weekend r/t prior chemo treatment, now resolved; discussed transfusion procedure, possible reaction, s/s of same and what to report to RN; pt reports no issues w/ previous transfusions; reviewed consent, pt signed same; all questions answered and pt agrees to proceed

## 2020-05-11 ENCOUNTER — LAB VISIT (OUTPATIENT)
Dept: LAB | Facility: HOSPITAL | Age: 70
End: 2020-05-11
Attending: INTERNAL MEDICINE
Payer: MEDICARE

## 2020-05-11 ENCOUNTER — TELEPHONE (OUTPATIENT)
Dept: HEMATOLOGY/ONCOLOGY | Facility: CLINIC | Age: 70
End: 2020-05-11

## 2020-05-11 DIAGNOSIS — D46.C MDS (MYELODYSPLASTIC SYNDROME) WITH 5Q DELETION: ICD-10-CM

## 2020-05-11 LAB
ABO + RH BLD: NORMAL
ALBUMIN SERPL BCP-MCNC: 3.9 G/DL (ref 3.5–5.2)
ALP SERPL-CCNC: 59 U/L (ref 55–135)
ALT SERPL W/O P-5'-P-CCNC: 55 U/L (ref 10–44)
ANION GAP SERPL CALC-SCNC: 10 MMOL/L (ref 8–16)
ANISOCYTOSIS BLD QL SMEAR: ABNORMAL
AST SERPL-CCNC: 33 U/L (ref 10–40)
BASOPHILS # BLD AUTO: 0.01 K/UL (ref 0–0.2)
BASOPHILS NFR BLD: 0.4 % (ref 0–1.9)
BILIRUB SERPL-MCNC: 0.4 MG/DL (ref 0.1–1)
BLD GP AB SCN CELLS X3 SERPL QL: NORMAL
BUN SERPL-MCNC: 24 MG/DL (ref 8–23)
CALCIUM SERPL-MCNC: 9.6 MG/DL (ref 8.7–10.5)
CHLORIDE SERPL-SCNC: 104 MMOL/L (ref 95–110)
CO2 SERPL-SCNC: 27 MMOL/L (ref 23–29)
CREAT SERPL-MCNC: 1 MG/DL (ref 0.5–1.4)
DIFFERENTIAL METHOD: ABNORMAL
EOSINOPHIL # BLD AUTO: 0.1 K/UL (ref 0–0.5)
EOSINOPHIL NFR BLD: 3.9 % (ref 0–8)
ERYTHROCYTE [DISTWIDTH] IN BLOOD BY AUTOMATED COUNT: 16.4 % (ref 11.5–14.5)
EST. GFR  (AFRICAN AMERICAN): >60 ML/MIN/1.73 M^2
EST. GFR  (NON AFRICAN AMERICAN): 57.6 ML/MIN/1.73 M^2
GLUCOSE SERPL-MCNC: 106 MG/DL (ref 70–110)
HCT VFR BLD AUTO: 24 % (ref 37–48.5)
HGB BLD-MCNC: 7.6 G/DL (ref 12–16)
HYPOCHROMIA BLD QL SMEAR: ABNORMAL
IMM GRANULOCYTES # BLD AUTO: 0.01 K/UL (ref 0–0.04)
IMM GRANULOCYTES NFR BLD AUTO: 0.4 % (ref 0–0.5)
LYMPHOCYTES # BLD AUTO: 1.1 K/UL (ref 1–4.8)
LYMPHOCYTES NFR BLD: 47 % (ref 18–48)
MCH RBC QN AUTO: 29.8 PG (ref 27–31)
MCHC RBC AUTO-ENTMCNC: 31.7 G/DL (ref 32–36)
MCV RBC AUTO: 94 FL (ref 82–98)
MONOCYTES # BLD AUTO: 0 K/UL (ref 0.3–1)
MONOCYTES NFR BLD: 1.7 % (ref 4–15)
NEUTROPHILS # BLD AUTO: 1.1 K/UL (ref 1.8–7.7)
NEUTROPHILS NFR BLD: 46.6 % (ref 38–73)
NRBC BLD-RTO: 0 /100 WBC
PLATELET # BLD AUTO: 69 K/UL (ref 150–350)
PLATELET BLD QL SMEAR: ABNORMAL
PMV BLD AUTO: 11.2 FL (ref 9.2–12.9)
POTASSIUM SERPL-SCNC: 4.5 MMOL/L (ref 3.5–5.1)
PROT SERPL-MCNC: 7.2 G/DL (ref 6–8.4)
RBC # BLD AUTO: 2.55 M/UL (ref 4–5.4)
SODIUM SERPL-SCNC: 141 MMOL/L (ref 136–145)
WBC # BLD AUTO: 2.3 K/UL (ref 3.9–12.7)

## 2020-05-11 PROCEDURE — 85025 COMPLETE CBC W/AUTO DIFF WBC: CPT | Mod: HCNC

## 2020-05-11 PROCEDURE — 36415 COLL VENOUS BLD VENIPUNCTURE: CPT | Mod: HCNC

## 2020-05-11 PROCEDURE — 86901 BLOOD TYPING SEROLOGIC RH(D): CPT | Mod: HCNC

## 2020-05-11 PROCEDURE — 80053 COMPREHEN METABOLIC PANEL: CPT | Mod: HCNC

## 2020-05-11 NOTE — TELEPHONE ENCOUNTER
----- Message from Solange Iqbal sent at 5/11/2020  1:08 PM CDT -----  Contact: PT   PT called to leave a message for Jagruti to give her a call back. Looking for her results from her blood work from this morning.     Callback: [phone]

## 2020-05-11 NOTE — TELEPHONE ENCOUNTER
Spoke to patient about lab results, patient states that she feels great and does not think that she needs blood at this time. Will redraw labs as scheduled

## 2020-05-18 ENCOUNTER — LAB VISIT (OUTPATIENT)
Dept: LAB | Facility: HOSPITAL | Age: 70
End: 2020-05-18
Payer: MEDICARE

## 2020-05-18 DIAGNOSIS — D46.C MDS (MYELODYSPLASTIC SYNDROME) WITH 5Q DELETION: ICD-10-CM

## 2020-05-18 DIAGNOSIS — D46.9 MYELODYSPLASTIC SYNDROME: Primary | ICD-10-CM

## 2020-05-18 LAB
ABO + RH BLD: NORMAL
ALBUMIN SERPL BCP-MCNC: 3.9 G/DL (ref 3.5–5.2)
ALP SERPL-CCNC: 64 U/L (ref 55–135)
ALT SERPL W/O P-5'-P-CCNC: 41 U/L (ref 10–44)
ANION GAP SERPL CALC-SCNC: 11 MMOL/L (ref 8–16)
ANISOCYTOSIS BLD QL SMEAR: SLIGHT
AST SERPL-CCNC: 23 U/L (ref 10–40)
BASOPHILS NFR BLD: 0 % (ref 0–1.9)
BILIRUB SERPL-MCNC: 0.4 MG/DL (ref 0.1–1)
BLD GP AB SCN CELLS X3 SERPL QL: NORMAL
BUN SERPL-MCNC: 23 MG/DL (ref 8–23)
CALCIUM SERPL-MCNC: 9.5 MG/DL (ref 8.7–10.5)
CHLORIDE SERPL-SCNC: 103 MMOL/L (ref 95–110)
CO2 SERPL-SCNC: 25 MMOL/L (ref 23–29)
CREAT SERPL-MCNC: 1.2 MG/DL (ref 0.5–1.4)
DIFFERENTIAL METHOD: ABNORMAL
EOSINOPHIL NFR BLD: 6 % (ref 0–8)
ERYTHROCYTE [DISTWIDTH] IN BLOOD BY AUTOMATED COUNT: 16.4 % (ref 11.5–14.5)
EST. GFR  (AFRICAN AMERICAN): 53.3 ML/MIN/1.73 M^2
EST. GFR  (NON AFRICAN AMERICAN): 46.2 ML/MIN/1.73 M^2
GIANT PLATELETS BLD QL SMEAR: PRESENT
GLUCOSE SERPL-MCNC: 114 MG/DL (ref 70–110)
HCT VFR BLD AUTO: 21.7 % (ref 37–48.5)
HGB BLD-MCNC: 6.9 G/DL (ref 12–16)
HYPOCHROMIA BLD QL SMEAR: ABNORMAL
IMM GRANULOCYTES # BLD AUTO: ABNORMAL K/UL (ref 0–0.04)
IMM GRANULOCYTES NFR BLD AUTO: ABNORMAL % (ref 0–0.5)
LYMPHOCYTES NFR BLD: 57 % (ref 18–48)
MCH RBC QN AUTO: 30.5 PG (ref 27–31)
MCHC RBC AUTO-ENTMCNC: 31.8 G/DL (ref 32–36)
MCV RBC AUTO: 96 FL (ref 82–98)
MONOCYTES NFR BLD: 2 % (ref 4–15)
NEUTROPHILS NFR BLD: 35 % (ref 38–73)
NRBC BLD-RTO: 0 /100 WBC
OVALOCYTES BLD QL SMEAR: ABNORMAL
PLATELET # BLD AUTO: 148 K/UL (ref 150–350)
PLATELET BLD QL SMEAR: ABNORMAL
PMV BLD AUTO: 11.5 FL (ref 9.2–12.9)
POIKILOCYTOSIS BLD QL SMEAR: SLIGHT
POLYCHROMASIA BLD QL SMEAR: ABNORMAL
POTASSIUM SERPL-SCNC: 4 MMOL/L (ref 3.5–5.1)
PROT SERPL-MCNC: 7.4 G/DL (ref 6–8.4)
RBC # BLD AUTO: 2.26 M/UL (ref 4–5.4)
SODIUM SERPL-SCNC: 139 MMOL/L (ref 136–145)
WBC # BLD AUTO: 1.44 K/UL (ref 3.9–12.7)

## 2020-05-18 PROCEDURE — 80053 COMPREHEN METABOLIC PANEL: CPT | Mod: HCNC

## 2020-05-18 PROCEDURE — 85027 COMPLETE CBC AUTOMATED: CPT | Mod: HCNC

## 2020-05-18 PROCEDURE — 86850 RBC ANTIBODY SCREEN: CPT | Mod: HCNC

## 2020-05-18 PROCEDURE — 85007 BL SMEAR W/DIFF WBC COUNT: CPT | Mod: HCNC

## 2020-05-18 RX ORDER — HYDROCODONE BITARTRATE AND ACETAMINOPHEN 500; 5 MG/1; MG/1
TABLET ORAL ONCE
Status: CANCELLED | OUTPATIENT
Start: 2020-05-18 | End: 2020-05-18

## 2020-05-18 RX ORDER — ACETAMINOPHEN 325 MG/1
650 TABLET ORAL
Status: CANCELLED | OUTPATIENT
Start: 2020-05-18

## 2020-05-18 RX ORDER — DIPHENHYDRAMINE HCL 25 MG
25 CAPSULE ORAL
Status: CANCELLED | OUTPATIENT
Start: 2020-05-18

## 2020-05-18 NOTE — PROGRESS NOTES
Ordered 1 unit prbc for hgb of 6.9.    Altagracia Cornejo, FNP  Hematology/Oncology/Bone Marrow Transplant

## 2020-05-19 ENCOUNTER — INFUSION (OUTPATIENT)
Dept: INFUSION THERAPY | Facility: HOSPITAL | Age: 70
End: 2020-05-19
Attending: INTERNAL MEDICINE
Payer: MEDICARE

## 2020-05-19 VITALS
HEART RATE: 85 BPM | RESPIRATION RATE: 17 BRPM | OXYGEN SATURATION: 98 % | SYSTOLIC BLOOD PRESSURE: 141 MMHG | TEMPERATURE: 99 F | DIASTOLIC BLOOD PRESSURE: 62 MMHG

## 2020-05-19 DIAGNOSIS — D46.9 MYELODYSPLASTIC SYNDROME: ICD-10-CM

## 2020-05-19 LAB
BLD PROD TYP BPU: NORMAL
BLOOD UNIT EXPIRATION DATE: NORMAL
BLOOD UNIT TYPE CODE: 5100
BLOOD UNIT TYPE: NORMAL
CODING SYSTEM: NORMAL
DISPENSE STATUS: NORMAL
NUM UNITS TRANS PACKED RBC: NORMAL

## 2020-05-19 PROCEDURE — P9040 RBC LEUKOREDUCED IRRADIATED: HCPCS | Mod: HCNC

## 2020-05-19 PROCEDURE — 36430 TRANSFUSION BLD/BLD COMPNT: CPT | Mod: HCNC

## 2020-05-19 PROCEDURE — 25000003 PHARM REV CODE 250: Mod: HCNC | Performed by: NURSE PRACTITIONER

## 2020-05-19 PROCEDURE — 86922 COMPATIBILITY TEST ANTIGLOB: CPT | Mod: HCNC

## 2020-05-19 RX ORDER — DIPHENHYDRAMINE HCL 25 MG
25 CAPSULE ORAL
Status: COMPLETED | OUTPATIENT
Start: 2020-05-19 | End: 2020-05-19

## 2020-05-19 RX ORDER — ACETAMINOPHEN 325 MG/1
650 TABLET ORAL
Status: COMPLETED | OUTPATIENT
Start: 2020-05-19 | End: 2020-05-19

## 2020-05-19 RX ORDER — HYDROCODONE BITARTRATE AND ACETAMINOPHEN 500; 5 MG/1; MG/1
TABLET ORAL ONCE
Status: COMPLETED | OUTPATIENT
Start: 2020-05-19 | End: 2020-05-19

## 2020-05-19 RX ADMIN — DIPHENHYDRAMINE HYDROCHLORIDE 25 MG: 25 CAPSULE ORAL at 02:05

## 2020-05-19 RX ADMIN — SODIUM CHLORIDE: 9 INJECTION, SOLUTION INTRAVENOUS at 02:05

## 2020-05-19 RX ADMIN — ACETAMINOPHEN 650 MG: 325 TABLET ORAL at 02:05

## 2020-05-19 NOTE — PLAN OF CARE
Pt ambulatory to clinic via rolling walker for infusion of one unit of PRBC's. Denies any sig complaints. Port accessed with good blood return. Consent in chart. Unit infused without any difficulty. Pt tolerated well. Ambulatory from clinic in [facility].

## 2020-05-20 PROCEDURE — 86902 BLOOD TYPE ANTIGEN DONOR EA: CPT | Mod: HCNC,59

## 2020-05-22 ENCOUNTER — CLINICAL SUPPORT (OUTPATIENT)
Dept: HEMATOLOGY/ONCOLOGY | Facility: CLINIC | Age: 70
End: 2020-05-22
Payer: MEDICARE

## 2020-05-22 ENCOUNTER — OFFICE VISIT (OUTPATIENT)
Dept: HEMATOLOGY/ONCOLOGY | Facility: CLINIC | Age: 70
End: 2020-05-22
Payer: MEDICARE

## 2020-05-22 DIAGNOSIS — I10 ESSENTIAL HYPERTENSION: ICD-10-CM

## 2020-05-22 DIAGNOSIS — M79.10 MYALGIA: ICD-10-CM

## 2020-05-22 DIAGNOSIS — E11.22 CONTROLLED TYPE 2 DIABETES MELLITUS WITH STAGE 3 CHRONIC KIDNEY DISEASE, WITHOUT LONG-TERM CURRENT USE OF INSULIN: ICD-10-CM

## 2020-05-22 DIAGNOSIS — F43.23 ADJUSTMENT DISORDER WITH MIXED ANXIETY AND DEPRESSED MOOD: ICD-10-CM

## 2020-05-22 DIAGNOSIS — D70.8 OTHER NEUTROPENIA: ICD-10-CM

## 2020-05-22 DIAGNOSIS — D46.9 MYELODYSPLASTIC SYNDROME: Primary | ICD-10-CM

## 2020-05-22 DIAGNOSIS — Z13.9 ENCOUNTER FOR SCREENING: Primary | ICD-10-CM

## 2020-05-22 DIAGNOSIS — N18.30 CONTROLLED TYPE 2 DIABETES MELLITUS WITH STAGE 3 CHRONIC KIDNEY DISEASE, WITHOUT LONG-TERM CURRENT USE OF INSULIN: ICD-10-CM

## 2020-05-22 DIAGNOSIS — D64.9 ANEMIA REQUIRING TRANSFUSIONS: ICD-10-CM

## 2020-05-22 DIAGNOSIS — I25.10 CORONARY ARTERY DISEASE, ANGINA PRESENCE UNSPECIFIED, UNSPECIFIED VESSEL OR LESION TYPE, UNSPECIFIED WHETHER NATIVE OR TRANSPLANTED HEART: ICD-10-CM

## 2020-05-22 DIAGNOSIS — G47.00 INSOMNIA, UNSPECIFIED TYPE: ICD-10-CM

## 2020-05-22 DIAGNOSIS — Z51.11 ENCOUNTER FOR CHEMOTHERAPY MANAGEMENT: ICD-10-CM

## 2020-05-22 LAB — SARS-COV-2 RNA RESP QL NAA+PROBE: NOT DETECTED

## 2020-05-22 PROCEDURE — 3044F HG A1C LEVEL LT 7.0%: CPT | Mod: HCNC,CPTII,95, | Performed by: NURSE PRACTITIONER

## 2020-05-22 PROCEDURE — 1159F PR MEDICATION LIST DOCUMENTED IN MEDICAL RECORD: ICD-10-PCS | Mod: HCNC,95,, | Performed by: NURSE PRACTITIONER

## 2020-05-22 PROCEDURE — 3044F PR MOST RECENT HEMOGLOBIN A1C LEVEL <7.0%: ICD-10-PCS | Mod: HCNC,CPTII,95, | Performed by: NURSE PRACTITIONER

## 2020-05-22 PROCEDURE — 1159F MED LIST DOCD IN RCRD: CPT | Mod: HCNC,95,, | Performed by: NURSE PRACTITIONER

## 2020-05-22 PROCEDURE — 99443 PR PHYSICIAN TELEPHONE EVALUATION 21-30 MIN: CPT | Mod: HCNC,95,, | Performed by: NURSE PRACTITIONER

## 2020-05-22 PROCEDURE — 99443 PR PHYSICIAN TELEPHONE EVALUATION 21-30 MIN: ICD-10-PCS | Mod: HCNC,95,, | Performed by: NURSE PRACTITIONER

## 2020-05-22 PROCEDURE — U0003 INFECTIOUS AGENT DETECTION BY NUCLEIC ACID (DNA OR RNA); SEVERE ACUTE RESPIRATORY SYNDROME CORONAVIRUS 2 (SARS-COV-2) (CORONAVIRUS DISEASE [COVID-19]), AMPLIFIED PROBE TECHNIQUE, MAKING USE OF HIGH THROUGHPUT TECHNOLOGIES AS DESCRIBED BY CMS-2020-01-R: HCPCS | Mod: HCNC

## 2020-05-22 PROCEDURE — 1101F PT FALLS ASSESS-DOCD LE1/YR: CPT | Mod: HCNC,CPTII,95, | Performed by: NURSE PRACTITIONER

## 2020-05-22 PROCEDURE — 1101F PR PT FALLS ASSESS DOC 0-1 FALLS W/OUT INJ PAST YR: ICD-10-PCS | Mod: HCNC,CPTII,95, | Performed by: NURSE PRACTITIONER

## 2020-05-22 NOTE — PROGRESS NOTES
Established Patient - Audio Only Telehealth Visit     The patient location is: home  The chief complaint leading to consultation is: MDS  Visit type: Virtual visit with audio only (telephone)  Total time spent with patient: 15  Total time spent in preparation and on documentation: 35 minutes       The reason for the audio only service rather than synchronous audio and video virtual visit was related to technical difficulties or patient preference/necessity.     Each patient to whom I provide medical services by telemedicine is:  (1) informed of the relationship between the physician and patient and the respective role of any other health care provider with respect to management of the patient; and (2) notified that they may decline to receive medical services by telemedicine and may withdraw from such care at any time. Patient verbally consented to receive this service via voice-only telephone call.     The patient location is: home  The chief complaint leading to consultation is: MDS  Visit type: Virtual visit with audio   Total time spent with patient: 20  Each patient to whom he or she provides medical services by telemedicine is:  (1) informed of the relationship between the physician and patient and the respective role of any other health care provider with respect to management of the patient; and (2) notified that he or she may decline to receive medical services by telemedicine and may withdraw from such care at any time.      Subjective:       Patient ID: Susan Puente is a 69 y.o. female.    Chief Complaint: No chief complaint on file.    HPI: Patient presents via virtual audio for follow up of her history of MDS 5 q del syndrome prior to cycle 14 Vidaza as third line therapy for 5q minus syndrome. She continues to have disease per recent BM bx 10/2019. Continues with chronic fatigue and leg pain. Leg pain has improved with gabapentin. She denies fevers, chills, sob, chest pain, n/v/c/d. Exercise  has decreased due to current COVID19 quarantine and closure of [employer]. Her COVID test is in progress. She will start C14 on Monday 5/25.    Oncology History   Ms. Puente is a 68 year old female with hx of DM2, peripheral vascular disease, tobacco use, CAD, hyperlipidemia, hypertension who was hospitalized 11/10/16 for anemia. hgb 5.3  MCH 43.4 with normal WBC and platelets. Patient had normal iron stores. She was transfused 3 units of PRBCs and discharged home with hgb 8.7 on 11/11/16. She was referred for further evalutation of her anemia. On 12/16/16 patient had a normal SPEP and immunofixation. Slightly elevated kappa light chains with normal ration. Elevated vitamin b12, normal folate, JAYDEN was positive with a low titer and negative profile. Patient had a bone marrow biopsy 1/5/16 which showed the core biopsy is normocellular for age (40%); however, megakaryocytes are increased and show frequent small, hypolobated forms. Additionally, a subset of the neutrophils are hypogranular. Blasts are not increased by either morphology (1.2%) or in the corresponding flow cytometric analysis. Fish detects a 5q deletion in 54.5% of nuclei. Cytogenetics reported 20 metaphases, 2 metaphases were normal and 18 metaphases had a 5q deletion. No additional cytogenetic abnormalities were detected. Findings consistent with 5q deletion syndrome.     Patient had a delay in obtaining Revlimid 10 mg daily but did start taking the medication 2/8/17. On 2/9/17 patient developed a diffuse maculopapular rash throughout scalp arms, legs and torso. She states she had no stridor or wheezing. She discontinued medication 2/15/17. Went to allergist who provided references on desensitization so that patient could resume Revlimid. Unfortunately developed pancytopenia and Revlimid stopped 6/16/17. She had a repeat BMBX  performed 6/8/17 and showed a hypercellular marrow (60%) continued atypia in the granulocytes and megakaryocytes were  noted.  Additionally, there is erythroid atypia present. No increase in blasts. Cytogenetics are normal and MDS FISH is negative, failing to show 5 q minus. NGS should no significant molecular mutations.  Anemia work up revealed bienvenido negative hemolysis.    Revlimid has been stopped since June 2017 after she developed pancytopenia while on Revlimid (required desensitization). CBC was normal for almost 1 year and marrow was negative for del5q. Bone marrow biopsy repeat from 6/2018 showed relapsed 5q minus MDS without new or additional mutations. Revlimid restarted at 2.5 mg daily with prednisone to prevent allergic reaction. Titrated to a goal of 10mg daily Revlimid. Hospital admission 3/12-3/17/19 for unresponsive event after blood transfusion, found to be profoundly pancytopenic at admission. Since hospital admission Revlimid has been stopped. Repeat marrow March 2019 shows persistent MDS with 5q minus.     Started cycle 1 Vidaza 5/6/19 subq for 5 days every 28 days. Restaging bone marrow biopsy from 10/24/19 shows persistent MDS, no excess blasts and 3 of 20 metaphases with 5q deletion.    Review of Systems   Constitutional: Negative for fever, chills, weight loss, fatigue.   HENT: Negative for sinus congestion or rhinorrhea. Negative for ear pain, mouth sores, nosebleeds and trouble swallowing.    Respiratory: Negative for cough, shortness of breath and wheezing.    Cardiovascular: Negative for chest pain and leg swelling.   Gastrointestinal: Negative for abdominal distention, abdominal pain, blood in stool, nausea and vomiting.   Endocrine: Negative for polyphagia and polyuria.   Genitourinary: Negative for dysuria, hematuria and urgency.   Musculoskeletal: Positive for sciatic pain much improved.   Skin: Negative for color change, pallor and rash.   Neurological: Negative for dizziness, weakness, light-headedness and headaches.   Hematological: Negative for adenopathy. Does not bruise/bleed easily.    Psychiatric/Behavioral: Negative for agitation and behavioral problems.       Objective:    No exam due to telehealth visit; COVID19      Assessment:       1. Myelodysplastic syndrome    2. Anemia requiring transfusions    3. Other neutropenia    4. Controlled type 2 diabetes mellitus with stage 3 chronic kidney disease, without long-term current use of insulin    5. Essential hypertension    6. Coronary artery disease, angina presence unspecified, unspecified vessel or lesion type, unspecified whether native or transplanted heart    7. Adjustment disorder with mixed anxiety and depressed mood    8. Myalgia    9. Insomnia, unspecified type        Plan:     MDS  - Initially with 5 q minus syndrome and treated with Revlimid. Developed allergy and was then desensitized. Soon after developed pancytopenia and Revlimid held since June 2017.    - BMBX on 6/8/17 was with normal cytogenetics. 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 Discussed treatment options of HMA therapy or repeat trial of Revlimid and patient wished to proceed with Revlimid   - Revlimid stopped again due to repeat allergic reaction and pancytopenia 3/2019  - Started cycle 1 Vidaza 5/6/19 subq for 5 days every 28 days  - Restaging bone marrow biopsy following cycle 5 from 10/24/19 shows persistent MDS, no excess blasts and 3 of 20 metaphases with 5q deletion  - Tolerated cycles 1-9 well thus far. Package insert for Vidaza recommends holding if ANC <1000, however pt has been receiving this with an ANC below 1000 for each cycle. Per Dr. Valdes, okay to continue to give despite neutropenia. Will proceed with C14 today  -had planned to repeat marrow biopsy in April 2020 with sedation but delayed due to COVID    Cytopenias 2/2 disease: anemia and leukopenia  - Transfuse for hgb < 7 or plts < 10K  - Continue ppx cipro, acyclovir, and fluconazole  - no indication for transfusion today    DM2  - management per PCP  - continue metformin    HTN  -  management per PCP  - continue lisinopril-HCTZ    CAD  - continue praluent for HLD per PCP (intolerant to statins)  - continue ASA    Adjustment disorder  - Improved mood on Celexa. Continue    Myalgias/possible sciatica  - started trial of gabapentin 100 mg bid 11/4/19, much improved; now only taking gabapentin once a day in AM  - patient has returned to [facility] and is now doing silver sneakers with a friend; she is very excited about this (3x per week)    Insonmia  - using melatonin OTC    Follow up:  - Please schedule CBC and type and screen twice weekly on Mondays and Thursdays for possible transfusions. Schedule through month of June.  - Schedule return visit with CBC, type and screen, CMP, and appt with Dr. Valdes or NP 6/22/20  - Schedule chemo chair for cycle 15 vidaza 6/22/20-6/26/20    Altagracia Cornejo, ANDRYP  Hematology/Oncology/Bone Marrow Transplant     This service was not originating from a related E/M service provided within the previous 7 days nor will  to an E/M service or procedure within the next 24 hours or my soonest available appointment.  Prevailing standard of care was able to be met in this audio-only visit.

## 2020-05-22 NOTE — Clinical Note
- Please schedule CBC and type and screen twice weekly on Mondays and Thursdays for possible transfusions. Schedule through month of Thais.- Schedule return visit with CBC, type and screen, CMP, and appt with Dr. Valdes or NP 6/22/20- Schedule chemo chair for cycle 15 vidaza 6/22/20-6/26/20

## 2020-05-25 ENCOUNTER — INFUSION (OUTPATIENT)
Dept: INFUSION THERAPY | Facility: HOSPITAL | Age: 70
End: 2020-05-25
Attending: INTERNAL MEDICINE
Payer: MEDICARE

## 2020-05-25 VITALS
SYSTOLIC BLOOD PRESSURE: 191 MMHG | DIASTOLIC BLOOD PRESSURE: 81 MMHG | HEIGHT: 66 IN | HEART RATE: 72 BPM | WEIGHT: 174.63 LBS | TEMPERATURE: 98 F | BODY MASS INDEX: 28.06 KG/M2 | RESPIRATION RATE: 18 BRPM

## 2020-05-25 DIAGNOSIS — D46.C MDS (MYELODYSPLASTIC SYNDROME) WITH 5Q DELETION: Primary | ICD-10-CM

## 2020-05-25 PROCEDURE — 96401 CHEMO ANTI-NEOPL SQ/IM: CPT | Mod: HCNC

## 2020-05-25 PROCEDURE — 63600175 PHARM REV CODE 636 W HCPCS: Mod: JG,HCNC | Performed by: INTERNAL MEDICINE

## 2020-05-25 RX ORDER — AZACITIDINE 100 MG/1
75 INJECTION, POWDER, LYOPHILIZED, FOR SOLUTION INTRAVENOUS; SUBCUTANEOUS
Status: COMPLETED | OUTPATIENT
Start: 2020-05-25 | End: 2020-05-25

## 2020-05-25 RX ADMIN — AZACITIDINE 150 MG: 100 INJECTION, POWDER, LYOPHILIZED, FOR SOLUTION INTRAVENOUS; SUBCUTANEOUS at 11:05

## 2020-05-26 ENCOUNTER — INFUSION (OUTPATIENT)
Dept: INFUSION THERAPY | Facility: HOSPITAL | Age: 70
End: 2020-05-26
Attending: INTERNAL MEDICINE
Payer: MEDICARE

## 2020-05-26 VITALS — SYSTOLIC BLOOD PRESSURE: 163 MMHG | DIASTOLIC BLOOD PRESSURE: 70 MMHG | HEART RATE: 80 BPM | TEMPERATURE: 99 F

## 2020-05-26 DIAGNOSIS — E87.6 HYPOKALEMIA: Primary | ICD-10-CM

## 2020-05-26 DIAGNOSIS — D46.C MDS (MYELODYSPLASTIC SYNDROME) WITH 5Q DELETION: ICD-10-CM

## 2020-05-26 PROCEDURE — 96401 CHEMO ANTI-NEOPL SQ/IM: CPT | Mod: HCNC

## 2020-05-26 PROCEDURE — 25000003 PHARM REV CODE 250: Mod: HCNC | Performed by: INTERNAL MEDICINE

## 2020-05-26 PROCEDURE — 63600175 PHARM REV CODE 636 W HCPCS: Mod: JG,HCNC | Performed by: INTERNAL MEDICINE

## 2020-05-26 RX ORDER — AZACITIDINE 100 MG/1
75 INJECTION, POWDER, LYOPHILIZED, FOR SOLUTION INTRAVENOUS; SUBCUTANEOUS
Status: COMPLETED | OUTPATIENT
Start: 2020-05-26 | End: 2020-05-26

## 2020-05-26 RX ORDER — ONDANSETRON 4 MG/1
8 TABLET, ORALLY DISINTEGRATING ORAL ONCE
Status: COMPLETED | OUTPATIENT
Start: 2020-05-26 | End: 2020-05-26

## 2020-05-26 RX ADMIN — AZACITIDINE 150 MG: 100 INJECTION, POWDER, LYOPHILIZED, FOR SOLUTION INTRAVENOUS; SUBCUTANEOUS at 01:05

## 2020-05-26 RX ADMIN — ONDANSETRON 8 MG: 4 TABLET, ORALLY DISINTEGRATING ORAL at 01:05

## 2020-05-27 ENCOUNTER — INFUSION (OUTPATIENT)
Dept: INFUSION THERAPY | Facility: HOSPITAL | Age: 70
End: 2020-05-27
Attending: INTERNAL MEDICINE
Payer: MEDICARE

## 2020-05-27 VITALS — HEART RATE: 103 BPM | SYSTOLIC BLOOD PRESSURE: 131 MMHG | DIASTOLIC BLOOD PRESSURE: 60 MMHG | TEMPERATURE: 99 F

## 2020-05-27 DIAGNOSIS — E87.6 HYPOKALEMIA: ICD-10-CM

## 2020-05-27 DIAGNOSIS — D46.C MDS (MYELODYSPLASTIC SYNDROME) WITH 5Q DELETION: Primary | ICD-10-CM

## 2020-05-27 PROCEDURE — 96401 CHEMO ANTI-NEOPL SQ/IM: CPT | Mod: HCNC

## 2020-05-27 PROCEDURE — 63600175 PHARM REV CODE 636 W HCPCS: Mod: JW,JG,HCNC | Performed by: INTERNAL MEDICINE

## 2020-05-27 RX ORDER — AZACITIDINE 100 MG/1
75 INJECTION, POWDER, LYOPHILIZED, FOR SOLUTION INTRAVENOUS; SUBCUTANEOUS
Status: COMPLETED | OUTPATIENT
Start: 2020-05-27 | End: 2020-05-27

## 2020-05-27 RX ORDER — ONDANSETRON 4 MG/1
8 TABLET, ORALLY DISINTEGRATING ORAL ONCE
Status: DISCONTINUED | OUTPATIENT
Start: 2020-05-27 | End: 2020-05-27 | Stop reason: HOSPADM

## 2020-05-27 RX ADMIN — AZACITIDINE 150 MG: 100 INJECTION, POWDER, LYOPHILIZED, FOR SOLUTION INTRAVENOUS; SUBCUTANEOUS at 01:05

## 2020-05-28 ENCOUNTER — INFUSION (OUTPATIENT)
Dept: INFUSION THERAPY | Facility: HOSPITAL | Age: 70
End: 2020-05-28
Attending: INTERNAL MEDICINE
Payer: MEDICARE

## 2020-05-28 ENCOUNTER — PATIENT MESSAGE (OUTPATIENT)
Dept: HEMATOLOGY/ONCOLOGY | Facility: CLINIC | Age: 70
End: 2020-05-28

## 2020-05-28 VITALS
RESPIRATION RATE: 18 BRPM | DIASTOLIC BLOOD PRESSURE: 61 MMHG | HEART RATE: 82 BPM | SYSTOLIC BLOOD PRESSURE: 132 MMHG | TEMPERATURE: 98 F

## 2020-05-28 DIAGNOSIS — E87.6 HYPOKALEMIA: ICD-10-CM

## 2020-05-28 DIAGNOSIS — D46.C MDS (MYELODYSPLASTIC SYNDROME) WITH 5Q DELETION: Primary | ICD-10-CM

## 2020-05-28 PROCEDURE — 96401 CHEMO ANTI-NEOPL SQ/IM: CPT | Mod: HCNC

## 2020-05-28 PROCEDURE — 63600175 PHARM REV CODE 636 W HCPCS: Mod: JG,HCNC | Performed by: INTERNAL MEDICINE

## 2020-05-28 RX ORDER — AZACITIDINE 100 MG/1
75 INJECTION, POWDER, LYOPHILIZED, FOR SOLUTION INTRAVENOUS; SUBCUTANEOUS
Status: COMPLETED | OUTPATIENT
Start: 2020-05-28 | End: 2020-05-28

## 2020-05-28 RX ORDER — ONDANSETRON 4 MG/1
8 TABLET, ORALLY DISINTEGRATING ORAL ONCE
Status: DISCONTINUED | OUTPATIENT
Start: 2020-05-28 | End: 2020-05-28 | Stop reason: HOSPADM

## 2020-05-28 RX ADMIN — AZACITIDINE 150 MG: 100 INJECTION, POWDER, LYOPHILIZED, FOR SOLUTION INTRAVENOUS; SUBCUTANEOUS at 01:05

## 2020-05-29 ENCOUNTER — INFUSION (OUTPATIENT)
Dept: INFUSION THERAPY | Facility: HOSPITAL | Age: 70
End: 2020-05-29
Attending: INTERNAL MEDICINE
Payer: MEDICARE

## 2020-05-29 VITALS
DIASTOLIC BLOOD PRESSURE: 62 MMHG | SYSTOLIC BLOOD PRESSURE: 129 MMHG | HEART RATE: 84 BPM | TEMPERATURE: 98 F | RESPIRATION RATE: 18 BRPM

## 2020-05-29 DIAGNOSIS — E87.6 HYPOKALEMIA: ICD-10-CM

## 2020-05-29 DIAGNOSIS — D46.C MDS (MYELODYSPLASTIC SYNDROME) WITH 5Q DELETION: Primary | ICD-10-CM

## 2020-05-29 PROCEDURE — 96401 CHEMO ANTI-NEOPL SQ/IM: CPT | Mod: HCNC

## 2020-05-29 PROCEDURE — 63600175 PHARM REV CODE 636 W HCPCS: Mod: JG,HCNC | Performed by: INTERNAL MEDICINE

## 2020-05-29 RX ORDER — AZACITIDINE 100 MG/1
75 INJECTION, POWDER, LYOPHILIZED, FOR SOLUTION INTRAVENOUS; SUBCUTANEOUS
Status: COMPLETED | OUTPATIENT
Start: 2020-05-29 | End: 2020-05-29

## 2020-05-29 RX ORDER — ONDANSETRON 4 MG/1
8 TABLET, ORALLY DISINTEGRATING ORAL ONCE
Status: DISCONTINUED | OUTPATIENT
Start: 2020-05-29 | End: 2020-05-29 | Stop reason: HOSPADM

## 2020-05-29 RX ADMIN — AZACITIDINE 150 MG: 100 INJECTION, POWDER, LYOPHILIZED, FOR SOLUTION INTRAVENOUS; SUBCUTANEOUS at 01:05

## 2020-06-01 ENCOUNTER — LAB VISIT (OUTPATIENT)
Dept: LAB | Facility: HOSPITAL | Age: 70
End: 2020-06-01
Attending: INTERNAL MEDICINE
Payer: MEDICARE

## 2020-06-01 DIAGNOSIS — D46.C MDS (MYELODYSPLASTIC SYNDROME) WITH 5Q DELETION: ICD-10-CM

## 2020-06-01 LAB
ABO + RH BLD: NORMAL
BASOPHILS # BLD AUTO: 0.01 K/UL (ref 0–0.2)
BASOPHILS NFR BLD: 0.4 % (ref 0–1.9)
BLD GP AB SCN CELLS X3 SERPL QL: NORMAL
DIFFERENTIAL METHOD: ABNORMAL
EOSINOPHIL # BLD AUTO: 0.1 K/UL (ref 0–0.5)
EOSINOPHIL NFR BLD: 2.5 % (ref 0–8)
ERYTHROCYTE [DISTWIDTH] IN BLOOD BY AUTOMATED COUNT: 16.6 % (ref 11.5–14.5)
HCT VFR BLD AUTO: 21.1 % (ref 37–48.5)
HGB BLD-MCNC: 6.6 G/DL (ref 12–16)
IMM GRANULOCYTES # BLD AUTO: 0.02 K/UL (ref 0–0.04)
IMM GRANULOCYTES NFR BLD AUTO: 0.7 % (ref 0–0.5)
LYMPHOCYTES # BLD AUTO: 1.2 K/UL (ref 1–4.8)
LYMPHOCYTES NFR BLD: 42.5 % (ref 18–48)
MCH RBC QN AUTO: 30.3 PG (ref 27–31)
MCHC RBC AUTO-ENTMCNC: 31.3 G/DL (ref 32–36)
MCV RBC AUTO: 97 FL (ref 82–98)
MONOCYTES # BLD AUTO: 0.1 K/UL (ref 0.3–1)
MONOCYTES NFR BLD: 2.8 % (ref 4–15)
NEUTROPHILS # BLD AUTO: 1.5 K/UL (ref 1.8–7.7)
NEUTROPHILS NFR BLD: 51.1 % (ref 38–73)
NRBC BLD-RTO: 0 /100 WBC
PLATELET # BLD AUTO: 151 K/UL (ref 150–350)
PLATELET BLD QL SMEAR: ABNORMAL
PMV BLD AUTO: 11.1 FL (ref 9.2–12.9)
RBC # BLD AUTO: 2.18 M/UL (ref 4–5.4)
WBC # BLD AUTO: 2.85 K/UL (ref 3.9–12.7)

## 2020-06-01 PROCEDURE — 85025 COMPLETE CBC W/AUTO DIFF WBC: CPT | Mod: HCNC

## 2020-06-01 PROCEDURE — 86901 BLOOD TYPING SEROLOGIC RH(D): CPT | Mod: HCNC

## 2020-06-01 PROCEDURE — 36415 COLL VENOUS BLD VENIPUNCTURE: CPT | Mod: HCNC

## 2020-06-03 DIAGNOSIS — D64.9 ANEMIA REQUIRING TRANSFUSIONS: ICD-10-CM

## 2020-06-03 DIAGNOSIS — D46.9 MYELODYSPLASTIC SYNDROME: Primary | ICD-10-CM

## 2020-06-03 DIAGNOSIS — D64.9 ANEMIA REQUIRING TRANSFUSIONS: Primary | ICD-10-CM

## 2020-06-03 RX ORDER — HYDROCODONE BITARTRATE AND ACETAMINOPHEN 500; 5 MG/1; MG/1
TABLET ORAL ONCE
Status: CANCELLED | OUTPATIENT
Start: 2020-06-03 | End: 2020-06-03

## 2020-06-03 RX ORDER — ACETAMINOPHEN 325 MG/1
650 TABLET ORAL
Status: CANCELLED | OUTPATIENT
Start: 2020-06-03

## 2020-06-03 RX ORDER — DIPHENHYDRAMINE HCL 25 MG
25 CAPSULE ORAL
Status: CANCELLED | OUTPATIENT
Start: 2020-06-03

## 2020-06-04 ENCOUNTER — INFUSION (OUTPATIENT)
Dept: INFUSION THERAPY | Facility: HOSPITAL | Age: 70
End: 2020-06-04
Attending: INTERNAL MEDICINE
Payer: MEDICARE

## 2020-06-04 VITALS
DIASTOLIC BLOOD PRESSURE: 58 MMHG | SYSTOLIC BLOOD PRESSURE: 133 MMHG | TEMPERATURE: 98 F | RESPIRATION RATE: 18 BRPM | HEART RATE: 84 BPM

## 2020-06-04 DIAGNOSIS — D64.9 ANEMIA REQUIRING TRANSFUSIONS: ICD-10-CM

## 2020-06-04 PROCEDURE — 86922 COMPATIBILITY TEST ANTIGLOB: CPT | Mod: HCNC

## 2020-06-04 PROCEDURE — 25000003 PHARM REV CODE 250: Mod: HCNC | Performed by: NURSE PRACTITIONER

## 2020-06-04 PROCEDURE — P9040 RBC LEUKOREDUCED IRRADIATED: HCPCS | Mod: HCNC

## 2020-06-04 PROCEDURE — 36430 TRANSFUSION BLD/BLD COMPNT: CPT | Mod: HCNC

## 2020-06-04 RX ORDER — DIPHENHYDRAMINE HCL 25 MG
25 CAPSULE ORAL
Status: COMPLETED | OUTPATIENT
Start: 2020-06-04 | End: 2020-06-04

## 2020-06-04 RX ORDER — HYDROCODONE BITARTRATE AND ACETAMINOPHEN 500; 5 MG/1; MG/1
TABLET ORAL ONCE
Status: COMPLETED | OUTPATIENT
Start: 2020-06-04 | End: 2020-06-04

## 2020-06-04 RX ORDER — ACETAMINOPHEN 325 MG/1
650 TABLET ORAL
Status: COMPLETED | OUTPATIENT
Start: 2020-06-04 | End: 2020-06-04

## 2020-06-04 RX ADMIN — DIPHENHYDRAMINE HYDROCHLORIDE 25 MG: 25 CAPSULE ORAL at 01:06

## 2020-06-04 RX ADMIN — ACETAMINOPHEN 650 MG: 325 TABLET ORAL at 01:06

## 2020-06-04 RX ADMIN — SODIUM CHLORIDE: 9 INJECTION, SOLUTION INTRAVENOUS at 01:06

## 2020-06-06 ENCOUNTER — PATIENT MESSAGE (OUTPATIENT)
Dept: HEMATOLOGY/ONCOLOGY | Facility: CLINIC | Age: 70
End: 2020-06-06

## 2020-06-06 DIAGNOSIS — D46.C MDS (MYELODYSPLASTIC SYNDROME) WITH 5Q DELETION: ICD-10-CM

## 2020-06-06 DIAGNOSIS — T45.1X5A CHEMOTHERAPY INDUCED NEUTROPENIA: ICD-10-CM

## 2020-06-06 DIAGNOSIS — D70.1 CHEMOTHERAPY INDUCED NEUTROPENIA: ICD-10-CM

## 2020-06-06 RX ORDER — CIPROFLOXACIN 500 MG/1
500 TABLET ORAL 2 TIMES DAILY
Qty: 60 TABLET | Refills: 5 | Status: CANCELLED | OUTPATIENT
Start: 2020-06-06 | End: 2020-06-13

## 2020-06-08 ENCOUNTER — LAB VISIT (OUTPATIENT)
Dept: LAB | Facility: HOSPITAL | Age: 70
End: 2020-06-08
Attending: INTERNAL MEDICINE
Payer: MEDICARE

## 2020-06-08 ENCOUNTER — TELEPHONE (OUTPATIENT)
Dept: HEMATOLOGY/ONCOLOGY | Facility: CLINIC | Age: 70
End: 2020-06-08

## 2020-06-08 DIAGNOSIS — D46.C MDS (MYELODYSPLASTIC SYNDROME) WITH 5Q DELETION: ICD-10-CM

## 2020-06-08 DIAGNOSIS — D64.9 ANEMIA REQUIRING TRANSFUSIONS: ICD-10-CM

## 2020-06-08 DIAGNOSIS — D46.9 MYELODYSPLASTIC SYNDROME: ICD-10-CM

## 2020-06-08 LAB
ABO + RH BLD: NORMAL
BASOPHILS # BLD AUTO: 0 K/UL (ref 0–0.2)
BASOPHILS NFR BLD: 0 % (ref 0–1.9)
BLD GP AB SCN CELLS X3 SERPL QL: NORMAL
DIFFERENTIAL METHOD: ABNORMAL
EOSINOPHIL # BLD AUTO: 0.1 K/UL (ref 0–0.5)
EOSINOPHIL NFR BLD: 3.3 % (ref 0–8)
ERYTHROCYTE [DISTWIDTH] IN BLOOD BY AUTOMATED COUNT: 16 % (ref 11.5–14.5)
HCT VFR BLD AUTO: 21.6 % (ref 37–48.5)
HGB BLD-MCNC: 6.8 G/DL (ref 12–16)
IMM GRANULOCYTES # BLD AUTO: 0.02 K/UL (ref 0–0.04)
IMM GRANULOCYTES NFR BLD AUTO: 0.8 % (ref 0–0.5)
LYMPHOCYTES # BLD AUTO: 0.9 K/UL (ref 1–4.8)
LYMPHOCYTES NFR BLD: 38.3 % (ref 18–48)
MCH RBC QN AUTO: 30.2 PG (ref 27–31)
MCHC RBC AUTO-ENTMCNC: 31.5 G/DL (ref 32–36)
MCV RBC AUTO: 96 FL (ref 82–98)
MONOCYTES # BLD AUTO: 0.1 K/UL (ref 0.3–1)
MONOCYTES NFR BLD: 2.1 % (ref 4–15)
NEUTROPHILS # BLD AUTO: 1.4 K/UL (ref 1.8–7.7)
NEUTROPHILS NFR BLD: 55.5 % (ref 38–73)
NRBC BLD-RTO: 0 /100 WBC
PLATELET # BLD AUTO: 69 K/UL (ref 150–350)
PMV BLD AUTO: 10.9 FL (ref 9.2–12.9)
RBC # BLD AUTO: 2.25 M/UL (ref 4–5.4)
WBC # BLD AUTO: 2.43 K/UL (ref 3.9–12.7)

## 2020-06-08 PROCEDURE — 36415 COLL VENOUS BLD VENIPUNCTURE: CPT | Mod: HCNC

## 2020-06-08 PROCEDURE — 85025 COMPLETE CBC W/AUTO DIFF WBC: CPT | Mod: HCNC

## 2020-06-08 PROCEDURE — 86901 BLOOD TYPING SEROLOGIC RH(D): CPT | Mod: HCNC

## 2020-06-08 RX ORDER — CIPROFLOXACIN 500 MG/1
500 TABLET ORAL 2 TIMES DAILY
Qty: 60 TABLET | Refills: 5 | Status: SHIPPED | OUTPATIENT
Start: 2020-06-08 | End: 2020-06-16 | Stop reason: SDUPTHER

## 2020-06-08 NOTE — TELEPHONE ENCOUNTER
"----- Message from Vanesa Dieter sent at 6/8/2020  1:35 PM CDT -----  Contact: Susan  Consult/Advisory:    Name Of Caller: Susan  Provider Name: Dr Valdes  Does patient feel the need to be seen today?  Relationship to the Pt?: Self  Contact Preference?: [phone]  What is the nature of the call?:  Patient request a callback to discuss results of labs    Additional Notes:  "Thank you for all that you do for our patients'"      "

## 2020-06-08 NOTE — TELEPHONE ENCOUNTER
Called pt to discuss lab results, informed patient of appointments set up on Thursday for blood. Pt states she is feeling well, but a little weak. Requested to be put on wait list for an earlier appointment. Informed infusion charge nurse

## 2020-06-10 PROCEDURE — 86902 BLOOD TYPE ANTIGEN DONOR EA: CPT | Mod: HCNC

## 2020-06-11 ENCOUNTER — INFUSION (OUTPATIENT)
Dept: INFUSION THERAPY | Facility: HOSPITAL | Age: 70
End: 2020-06-11
Attending: INTERNAL MEDICINE
Payer: MEDICARE

## 2020-06-11 VITALS
TEMPERATURE: 99 F | DIASTOLIC BLOOD PRESSURE: 70 MMHG | HEART RATE: 78 BPM | SYSTOLIC BLOOD PRESSURE: 160 MMHG | RESPIRATION RATE: 18 BRPM

## 2020-06-11 DIAGNOSIS — D46.C MDS (MYELODYSPLASTIC SYNDROME) WITH 5Q DELETION: Primary | ICD-10-CM

## 2020-06-11 PROCEDURE — 63600175 PHARM REV CODE 636 W HCPCS: Mod: HCNC | Performed by: INTERNAL MEDICINE

## 2020-06-11 PROCEDURE — 25000003 PHARM REV CODE 250: Mod: HCNC | Performed by: INTERNAL MEDICINE

## 2020-06-11 PROCEDURE — P9040 RBC LEUKOREDUCED IRRADIATED: HCPCS

## 2020-06-11 PROCEDURE — 86644 CMV ANTIBODY: CPT

## 2020-06-11 PROCEDURE — 36430 TRANSFUSION BLD/BLD COMPNT: CPT | Mod: HCNC

## 2020-06-11 PROCEDURE — 86922 COMPATIBILITY TEST ANTIGLOB: CPT

## 2020-06-11 RX ORDER — ACETAMINOPHEN 325 MG/1
650 TABLET ORAL
Status: COMPLETED | OUTPATIENT
Start: 2020-06-11 | End: 2020-06-11

## 2020-06-11 RX ORDER — DIPHENHYDRAMINE HCL 25 MG
25 CAPSULE ORAL
Status: COMPLETED | OUTPATIENT
Start: 2020-06-11 | End: 2020-06-11

## 2020-06-11 RX ORDER — HYDROCODONE BITARTRATE AND ACETAMINOPHEN 500; 5 MG/1; MG/1
TABLET ORAL
Status: DISCONTINUED | OUTPATIENT
Start: 2020-06-11 | End: 2020-06-11 | Stop reason: HOSPADM

## 2020-06-11 RX ORDER — HEPARIN 100 UNIT/ML
500 SYRINGE INTRAVENOUS
Status: COMPLETED | OUTPATIENT
Start: 2020-06-11 | End: 2020-06-11

## 2020-06-11 RX ADMIN — HEPARIN 500 UNITS: 100 SYRINGE at 05:06

## 2020-06-11 RX ADMIN — SODIUM CHLORIDE: 0.9 INJECTION, SOLUTION INTRAVENOUS at 03:06

## 2020-06-11 RX ADMIN — DIPHENHYDRAMINE HYDROCHLORIDE 25 MG: 25 CAPSULE ORAL at 03:06

## 2020-06-11 RX ADMIN — ACETAMINOPHEN 650 MG: 325 TABLET ORAL at 03:06

## 2020-06-11 NOTE — PLAN OF CARE
1uPRBC administered and tolerated well. PAC flushed with NS hep locked and de accessed site covered. D/c to home ambulatory with walker, no assist needed, vitals stable.

## 2020-06-15 ENCOUNTER — LAB VISIT (OUTPATIENT)
Dept: LAB | Facility: HOSPITAL | Age: 70
End: 2020-06-15
Attending: INTERNAL MEDICINE
Payer: MEDICARE

## 2020-06-15 DIAGNOSIS — D46.C MDS (MYELODYSPLASTIC SYNDROME) WITH 5Q DELETION: ICD-10-CM

## 2020-06-15 DIAGNOSIS — D64.9 ANEMIA REQUIRING TRANSFUSIONS: ICD-10-CM

## 2020-06-15 DIAGNOSIS — D46.9 MYELODYSPLASTIC SYNDROME: ICD-10-CM

## 2020-06-15 LAB
ABO + RH BLD: NORMAL
ANISOCYTOSIS BLD QL SMEAR: ABNORMAL
BASOPHILS # BLD AUTO: ABNORMAL K/UL (ref 0–0.2)
BASOPHILS NFR BLD: 3 % (ref 0–1.9)
BLD GP AB SCN CELLS X3 SERPL QL: NORMAL
DIFFERENTIAL METHOD: ABNORMAL
EOSINOPHIL # BLD AUTO: ABNORMAL K/UL (ref 0–0.5)
EOSINOPHIL NFR BLD: 3 % (ref 0–8)
ERYTHROCYTE [DISTWIDTH] IN BLOOD BY AUTOMATED COUNT: 16.8 % (ref 11.5–14.5)
HCT VFR BLD AUTO: 23.9 % (ref 37–48.5)
HGB BLD-MCNC: 7.4 G/DL (ref 12–16)
IMM GRANULOCYTES # BLD AUTO: ABNORMAL K/UL (ref 0–0.04)
IMM GRANULOCYTES NFR BLD AUTO: ABNORMAL % (ref 0–0.5)
LYMPHOCYTES # BLD AUTO: ABNORMAL K/UL (ref 1–4.8)
LYMPHOCYTES NFR BLD: 56 % (ref 18–48)
MCH RBC QN AUTO: 29.5 PG (ref 27–31)
MCHC RBC AUTO-ENTMCNC: 31 G/DL (ref 32–36)
MCV RBC AUTO: 95 FL (ref 82–98)
MONOCYTES # BLD AUTO: ABNORMAL K/UL (ref 0.3–1)
MONOCYTES NFR BLD: 0 % (ref 4–15)
NEUTROPHILS # BLD AUTO: ABNORMAL K/UL (ref 1.8–7.7)
NEUTROPHILS NFR BLD: 38 % (ref 38–73)
NRBC BLD-RTO: 0 /100 WBC
PLATELET # BLD AUTO: 153 K/UL (ref 150–350)
PLATELET BLD QL SMEAR: ABNORMAL
PMV BLD AUTO: 11.2 FL (ref 9.2–12.9)
POLYCHROMASIA BLD QL SMEAR: ABNORMAL
RBC # BLD AUTO: 2.51 M/UL (ref 4–5.4)
WBC # BLD AUTO: 1.56 K/UL (ref 3.9–12.7)

## 2020-06-15 PROCEDURE — 85007 BL SMEAR W/DIFF WBC COUNT: CPT | Mod: HCNC

## 2020-06-15 PROCEDURE — 85027 COMPLETE CBC AUTOMATED: CPT | Mod: HCNC

## 2020-06-15 PROCEDURE — 86850 RBC ANTIBODY SCREEN: CPT | Mod: HCNC

## 2020-06-15 PROCEDURE — 36415 COLL VENOUS BLD VENIPUNCTURE: CPT | Mod: HCNC

## 2020-06-16 DIAGNOSIS — Z79.2 PROPHYLACTIC ANTIBIOTIC: ICD-10-CM

## 2020-06-16 DIAGNOSIS — D46.9 MYELODYSPLASTIC SYNDROME: Primary | ICD-10-CM

## 2020-06-16 RX ORDER — CIPROFLOXACIN 500 MG/1
500 TABLET ORAL 2 TIMES DAILY
Qty: 60 TABLET | Refills: 5 | Status: SHIPPED | OUTPATIENT
Start: 2020-06-16 | End: 2021-04-18 | Stop reason: SDUPTHER

## 2020-06-18 ENCOUNTER — LAB VISIT (OUTPATIENT)
Dept: LAB | Facility: HOSPITAL | Age: 70
End: 2020-06-18
Attending: INTERNAL MEDICINE
Payer: MEDICARE

## 2020-06-18 ENCOUNTER — TELEPHONE (OUTPATIENT)
Dept: PHARMACY | Facility: CLINIC | Age: 70
End: 2020-06-18

## 2020-06-18 DIAGNOSIS — D46.C MDS (MYELODYSPLASTIC SYNDROME) WITH 5Q DELETION: ICD-10-CM

## 2020-06-18 DIAGNOSIS — D46.9 MYELODYSPLASTIC SYNDROME: ICD-10-CM

## 2020-06-18 DIAGNOSIS — D64.9 ANEMIA REQUIRING TRANSFUSIONS: ICD-10-CM

## 2020-06-18 LAB
ABO + RH BLD: NORMAL
BASOPHILS # BLD AUTO: 0.04 K/UL (ref 0–0.2)
BASOPHILS NFR BLD: 2.4 % (ref 0–1.9)
BLD GP AB SCN CELLS X3 SERPL QL: NORMAL
DIFFERENTIAL METHOD: ABNORMAL
EOSINOPHIL # BLD AUTO: 0.1 K/UL (ref 0–0.5)
EOSINOPHIL NFR BLD: 5.5 % (ref 0–8)
ERYTHROCYTE [DISTWIDTH] IN BLOOD BY AUTOMATED COUNT: 17.2 % (ref 11.5–14.5)
HCT VFR BLD AUTO: 23.6 % (ref 37–48.5)
HGB BLD-MCNC: 7.5 G/DL (ref 12–16)
IMM GRANULOCYTES # BLD AUTO: 0 K/UL (ref 0–0.04)
IMM GRANULOCYTES NFR BLD AUTO: 0 % (ref 0–0.5)
LYMPHOCYTES # BLD AUTO: 1.1 K/UL (ref 1–4.8)
LYMPHOCYTES NFR BLD: 67.1 % (ref 18–48)
MCH RBC QN AUTO: 30.2 PG (ref 27–31)
MCHC RBC AUTO-ENTMCNC: 31.8 G/DL (ref 32–36)
MCV RBC AUTO: 95 FL (ref 82–98)
MONOCYTES # BLD AUTO: 0.1 K/UL (ref 0.3–1)
MONOCYTES NFR BLD: 4.3 % (ref 4–15)
NEUTROPHILS # BLD AUTO: 0.3 K/UL (ref 1.8–7.7)
NEUTROPHILS NFR BLD: 20.7 % (ref 38–73)
NRBC BLD-RTO: 0 /100 WBC
PLATELET # BLD AUTO: 270 K/UL (ref 150–350)
PMV BLD AUTO: 11.2 FL (ref 9.2–12.9)
RBC # BLD AUTO: 2.48 M/UL (ref 4–5.4)
WBC # BLD AUTO: 1.64 K/UL (ref 3.9–12.7)

## 2020-06-18 PROCEDURE — 36415 COLL VENOUS BLD VENIPUNCTURE: CPT | Mod: HCNC

## 2020-06-18 PROCEDURE — 85025 COMPLETE CBC W/AUTO DIFF WBC: CPT | Mod: HCNC

## 2020-06-18 PROCEDURE — 86901 BLOOD TYPING SEROLOGIC RH(D): CPT | Mod: HCNC

## 2020-06-18 NOTE — TELEPHONE ENCOUNTER
Refill call regarding Repatha at [facility]. Will prepare for shipment with consent of patient on  to arrive . Copay 0.00. Patient has not started any new medications including OTC drugs. Patient has not had any medication/ dose or instruction changes. No new allergies or side effects reported with this shipment. Medication is being taken as prescribed by physician and properly stored. Two patient identifiers:  and Address verified. Patient has no questions or concerns for RP. Next injection  with one injection on hand.

## 2020-06-21 NOTE — PROGRESS NOTES
Subjective:       Patient ID: Susan Puente is a 69 y.o. female.    Chief Complaint: No chief complaint on file.    HPI: Patient presents hematology clinic for follow up of her history of MDS 5 q del syndrome prior to cycle 15 Vidaza as third line therapy for 5q minus syndrome. She continues to have disease per recent BM bx 10/2019. Continues with chronic fatigue. Chronic shoulder pain and leg pain.  Pain improved with gabapentin, bio freeze and turmeric. C/o SOB when Hb low continue to monitor labs twice weekly. She denies fevers, chills, sob, chest pain, n/v/c/d. Exercise has decreased due to current COVID19 quarantine and closure of [place]. She will start C15 on Monday 06/22.    Oncology History   Ms. Puente is a 68 year old female with hx of DM2, peripheral vascular disease, tobacco use, CAD, hyperlipidemia, hypertension who was hospitalized 11/10/16 for anemia. hgb 5.3  MCH 43.4 with normal WBC and platelets. Patient had normal iron stores. She was transfused 3 units of PRBCs and discharged home with hgb 8.7 on 11/11/16. She was referred for further evalutation of her anemia. On 12/16/16 patient had a normal SPEP and immunofixation. Slightly elevated kappa light chains with normal ration. Elevated vitamin b12, normal folate, JAYDEN was positive with a low titer and negative profile. Patient had a bone marrow biopsy 1/5/16 which showed the core biopsy is normocellular for age (40%); however, megakaryocytes are increased and show frequent small, hypolobated forms. Additionally, a subset of the neutrophils are hypogranular. Blasts are not increased by either morphology (1.2%) or in the corresponding flow cytometric analysis. Fish detects a 5q deletion in 54.5% of nuclei. Cytogenetics reported 20 metaphases, 2 metaphases were normal and 18 metaphases had a 5q deletion. No additional cytogenetic abnormalities were detected. Findings consistent with 5q deletion syndrome.     Patient had a delay in  obtaining Revlimid 10 mg daily but did start taking the medication 2/8/17. On 2/9/17 patient developed a diffuse maculopapular rash throughout scalp arms, legs and torso. She states she had no stridor or wheezing. She discontinued medication 2/15/17. Went to allergist who provided references on desensitization so that patient could resume Revlimid. Unfortunately developed pancytopenia and Revlimid stopped 6/16/17. She had a repeat BMBX  performed 6/8/17 and showed a hypercellular marrow (60%) continued atypia in the granulocytes and megakaryocytes were noted.  Additionally, there is erythroid atypia present. No increase in blasts. Cytogenetics are normal and MDS FISH is negative, failing to show 5 q minus. NGS should no significant molecular mutations.  Anemia work up revealed bienvenido negative hemolysis.    Revlimid has been stopped since June 2017 after she developed pancytopenia while on Revlimid (required desensitization). CBC was normal for almost 1 year and marrow was negative for del5q. Bone marrow biopsy repeat from 6/2018 showed relapsed 5q minus MDS without new or additional mutations. Revlimid restarted at 2.5 mg daily with prednisone to prevent allergic reaction. Titrated to a goal of 10mg daily Revlimid. Hospital admission 3/12-3/17/19 for unresponsive event after blood transfusion, found to be profoundly pancytopenic at admission. Since hospital admission Revlimid has been stopped. Repeat marrow March 2019 shows persistent MDS with 5q minus.     Started cycle 1 Vidaza 5/6/19 subq for 5 days every 28 days. Restaging bone marrow biopsy from 10/24/19 shows persistent MDS, no excess blasts and 3 of 20 metaphases with 5q deletion.    Review of Systems   Constitutional: Negative for fever, chills, weight loss, Positive for fatigue.   HENT: Negative for sinus congestion or rhinorrhea. Negative for ear pain, mouth sores, nosebleeds and trouble swallowing.    Respiratory: Negative for cough, shortness of breath  "and wheezing.    Cardiovascular: Negative for chest pain and leg swelling.   Gastrointestinal: Negative for abdominal distention, abdominal pain, blood in stool, nausea and vomiting.   Endocrine: Negative for polyphagia and polyuria.   Genitourinary: Negative for dysuria, hematuria and urgency.   Musculoskeletal: Positive for bilateral shoulder pain, sciatic pain much improved.   Skin: Negative for color change, pallor and rash.   Neurological: Negative for dizziness, weakness, light-headedness and headaches.   Hematological: Negative for adenopathy. Does not bruise/bleed easily.   Psychiatric/Behavioral: Negative for agitation and behavioral problems.     BP (!) 149/70   Pulse 90   Temp 98.4 °F (36.9 °C)   Resp 18   Ht 5' 5.5" (1.664 m)   Wt 78 kg (172 lb 1.1 oz)   SpO2 98%   BMI 28.20 kg/m²         Objective:   Physical Exam  Constitutional:       Appearance: She is well-developed.   HENT:      Head: Normocephalic and atraumatic.      Mouth/Throat:      Mouth: Mucous membranes are moist. No oral lesions.      Pharynx: Oropharynx is clear.   Eyes:      Conjunctiva/sclera: Conjunctivae normal.   Neck:      Musculoskeletal: Normal range of motion.   Cardiovascular:      Rate and Rhythm: Normal rate and regular rhythm.      Heart sounds: Normal heart sounds.   Pulmonary:      Effort: No respiratory distress.      Breath sounds: Normal breath sounds.   Abdominal:      General: Bowel sounds are normal.      Palpations: Abdomen is soft.   Musculoskeletal: Normal range of motion.   Skin:     General: Skin is warm and dry.   Neurological:      Mental Status: She is alert and oriented to person, place, and time.   Psychiatric:         Behavior: Behavior normal.         Thought Content: Thought content normal.         Judgment: Judgment normal.           Assessment:       1. Myelodysplastic syndrome    2. Cytopenia    3. Type 2 diabetes mellitus without complication, unspecified whether long term insulin use    4. " Essential hypertension    5. Coronary artery disease, angina presence unspecified, unspecified vessel or lesion type, unspecified whether native or transplanted heart    6. Adjustment disorder with mixed anxiety and depressed mood    7. Insomnia, unspecified type        Plan:     MDS  - Initially with 5 q minus syndrome and treated with Revlimid. Developed allergy and was then desensitized. Soon after developed pancytopenia and Revlimid held since June 2017.    - BMBX on 6/8/17 was with normal cytogenetics. 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 Discussed treatment options of HMA therapy or repeat trial of Revlimid and patient wished to proceed with Revlimid   - Revlimid stopped again due to repeat allergic reaction and pancytopenia 3/2019  - Started cycle 1 Vidaza 5/6/19 subq for 5 days every 28 days  - Restaging bone marrow biopsy following cycle 5 from 10/24/19 shows persistent MDS, no excess blasts and 3 of 20 metaphases with 5q deletion  - Tolerated cycles 1-14 well thus far. Package insert for Vidaza recommends holding if ANC <1000, however pt has been receiving this with an ANC below 1000 for each cycle. Per Dr. Valdes, okay to continue to give despite neutropenia. Will proceed with C15 today  -had planned to repeat marrow biopsy in April 2020 with sedation but delayed due to COVID.  -Discussed patient about bone marrow biopsy at clinic, patient prefer only under sedation at OR,  made aware.     Cytopenias 2/2 disease: anemia and leukopenia  - Transfuse for hgb < 7 or plts < 10K  - Continue ppx cipro, acyclovir, and fluconazole  - no indication for transfusion today    DM2  - management per PCP  - continue metformin  - Patient will f/u with PCP consider to change metformin to other agent due to recent recall.     HTN  - management per PCP  - continue home BP regimen    CAD  - continue  Repatha  for HLD per PCP (intolerant to statins)  - continue ASA    Adjustment disorder  - Improved mood on  Celexa.    Myalgias/possible sciatica  - started trial of gabapentin 100 mg once a day in AM  - Continue on bio-freeze and turmeric    Insonmia  - using melatonin OTC    Follow up:  - Please schedule CBC and type and screen twice weekly on Mondays and Thursdays for possible transfusions. Schedule through month of July.  - Schedule return visit with CBC, type and screen, CMP, and appt with Dr. Valdes or NP 07/20/20  - Schedule for rapid covid with lab appointment  07/16/20  - Schedule chemo chair for cycle 16 vidaza 07/20/20-07/24/20     Kourtney Daly NP  Hematology/Oncology/BMT

## 2020-06-22 ENCOUNTER — INFUSION (OUTPATIENT)
Dept: INFUSION THERAPY | Facility: HOSPITAL | Age: 70
End: 2020-06-22
Attending: INTERNAL MEDICINE
Payer: MEDICARE

## 2020-06-22 ENCOUNTER — OFFICE VISIT (OUTPATIENT)
Dept: HEMATOLOGY/ONCOLOGY | Facility: CLINIC | Age: 70
End: 2020-06-22
Payer: MEDICARE

## 2020-06-22 VITALS
TEMPERATURE: 98 F | RESPIRATION RATE: 18 BRPM | HEART RATE: 90 BPM | WEIGHT: 172.06 LBS | HEIGHT: 66 IN | OXYGEN SATURATION: 98 % | BODY MASS INDEX: 27.65 KG/M2 | DIASTOLIC BLOOD PRESSURE: 70 MMHG | SYSTOLIC BLOOD PRESSURE: 149 MMHG

## 2020-06-22 DIAGNOSIS — D75.9 CYTOPENIA: ICD-10-CM

## 2020-06-22 DIAGNOSIS — E11.9 TYPE 2 DIABETES MELLITUS WITHOUT COMPLICATION, UNSPECIFIED WHETHER LONG TERM INSULIN USE: ICD-10-CM

## 2020-06-22 DIAGNOSIS — G47.00 INSOMNIA, UNSPECIFIED TYPE: ICD-10-CM

## 2020-06-22 DIAGNOSIS — F43.23 ADJUSTMENT DISORDER WITH MIXED ANXIETY AND DEPRESSED MOOD: ICD-10-CM

## 2020-06-22 DIAGNOSIS — D46.9 MYELODYSPLASTIC SYNDROME: Primary | ICD-10-CM

## 2020-06-22 DIAGNOSIS — D46.C MDS (MYELODYSPLASTIC SYNDROME) WITH 5Q DELETION: Primary | ICD-10-CM

## 2020-06-22 DIAGNOSIS — I10 ESSENTIAL HYPERTENSION: ICD-10-CM

## 2020-06-22 DIAGNOSIS — I25.10 CORONARY ARTERY DISEASE, ANGINA PRESENCE UNSPECIFIED, UNSPECIFIED VESSEL OR LESION TYPE, UNSPECIFIED WHETHER NATIVE OR TRANSPLANTED HEART: ICD-10-CM

## 2020-06-22 PROCEDURE — 1101F PT FALLS ASSESS-DOCD LE1/YR: CPT | Mod: HCNC,CPTII,S$GLB, | Performed by: NURSE PRACTITIONER

## 2020-06-22 PROCEDURE — 96401 CHEMO ANTI-NEOPL SQ/IM: CPT | Mod: HCNC

## 2020-06-22 PROCEDURE — 99214 PR OFFICE/OUTPT VISIT, EST, LEVL IV, 30-39 MIN: ICD-10-PCS | Mod: HCNC,S$GLB,, | Performed by: NURSE PRACTITIONER

## 2020-06-22 PROCEDURE — 3044F HG A1C LEVEL LT 7.0%: CPT | Mod: HCNC,CPTII,S$GLB, | Performed by: NURSE PRACTITIONER

## 2020-06-22 PROCEDURE — 99214 OFFICE O/P EST MOD 30 MIN: CPT | Mod: HCNC,S$GLB,, | Performed by: NURSE PRACTITIONER

## 2020-06-22 PROCEDURE — 1159F MED LIST DOCD IN RCRD: CPT | Mod: HCNC,S$GLB,, | Performed by: NURSE PRACTITIONER

## 2020-06-22 PROCEDURE — 1126F PR PAIN SEVERITY QUANTIFIED, NO PAIN PRESENT: ICD-10-PCS | Mod: HCNC,S$GLB,, | Performed by: NURSE PRACTITIONER

## 2020-06-22 PROCEDURE — 99499 RISK ADDL DX/OHS AUDIT: ICD-10-PCS | Mod: HCNC,S$GLB,, | Performed by: NURSE PRACTITIONER

## 2020-06-22 PROCEDURE — 1101F PR PT FALLS ASSESS DOC 0-1 FALLS W/OUT INJ PAST YR: ICD-10-PCS | Mod: HCNC,CPTII,S$GLB, | Performed by: NURSE PRACTITIONER

## 2020-06-22 PROCEDURE — 3078F DIAST BP <80 MM HG: CPT | Mod: HCNC,CPTII,S$GLB, | Performed by: NURSE PRACTITIONER

## 2020-06-22 PROCEDURE — 3008F PR BODY MASS INDEX (BMI) DOCUMENTED: ICD-10-PCS | Mod: HCNC,CPTII,S$GLB, | Performed by: NURSE PRACTITIONER

## 2020-06-22 PROCEDURE — 99499 UNLISTED E&M SERVICE: CPT | Mod: HCNC,S$GLB,, | Performed by: NURSE PRACTITIONER

## 2020-06-22 PROCEDURE — 99999 PR PBB SHADOW E&M-EST. PATIENT-LVL V: ICD-10-PCS | Mod: PBBFAC,HCNC,, | Performed by: NURSE PRACTITIONER

## 2020-06-22 PROCEDURE — 1159F PR MEDICATION LIST DOCUMENTED IN MEDICAL RECORD: ICD-10-PCS | Mod: HCNC,S$GLB,, | Performed by: NURSE PRACTITIONER

## 2020-06-22 PROCEDURE — 63600175 PHARM REV CODE 636 W HCPCS: Mod: JG,HCNC | Performed by: INTERNAL MEDICINE

## 2020-06-22 PROCEDURE — 99999 PR PBB SHADOW E&M-EST. PATIENT-LVL V: CPT | Mod: PBBFAC,HCNC,, | Performed by: NURSE PRACTITIONER

## 2020-06-22 PROCEDURE — 3008F BODY MASS INDEX DOCD: CPT | Mod: HCNC,CPTII,S$GLB, | Performed by: NURSE PRACTITIONER

## 2020-06-22 PROCEDURE — 3077F SYST BP >= 140 MM HG: CPT | Mod: HCNC,CPTII,S$GLB, | Performed by: NURSE PRACTITIONER

## 2020-06-22 PROCEDURE — 3077F PR MOST RECENT SYSTOLIC BLOOD PRESSURE >= 140 MM HG: ICD-10-PCS | Mod: HCNC,CPTII,S$GLB, | Performed by: NURSE PRACTITIONER

## 2020-06-22 PROCEDURE — 3078F PR MOST RECENT DIASTOLIC BLOOD PRESSURE < 80 MM HG: ICD-10-PCS | Mod: HCNC,CPTII,S$GLB, | Performed by: NURSE PRACTITIONER

## 2020-06-22 PROCEDURE — 3044F PR MOST RECENT HEMOGLOBIN A1C LEVEL <7.0%: ICD-10-PCS | Mod: HCNC,CPTII,S$GLB, | Performed by: NURSE PRACTITIONER

## 2020-06-22 PROCEDURE — 1126F AMNT PAIN NOTED NONE PRSNT: CPT | Mod: HCNC,S$GLB,, | Performed by: NURSE PRACTITIONER

## 2020-06-22 RX ORDER — AZACITIDINE 100 MG/1
75 INJECTION, POWDER, LYOPHILIZED, FOR SOLUTION INTRAVENOUS; SUBCUTANEOUS
Status: CANCELLED | OUTPATIENT
Start: 2020-06-24

## 2020-06-22 RX ORDER — ONDANSETRON 4 MG/1
8 TABLET, ORALLY DISINTEGRATING ORAL ONCE
Status: CANCELLED
Start: 2020-06-25

## 2020-06-22 RX ORDER — ONDANSETRON 4 MG/1
8 TABLET, ORALLY DISINTEGRATING ORAL ONCE
Status: CANCELLED
Start: 2020-06-22

## 2020-06-22 RX ORDER — AZACITIDINE 100 MG/1
75 INJECTION, POWDER, LYOPHILIZED, FOR SOLUTION INTRAVENOUS; SUBCUTANEOUS
Status: COMPLETED | OUTPATIENT
Start: 2020-06-22 | End: 2020-06-22

## 2020-06-22 RX ORDER — AZACITIDINE 100 MG/1
75 INJECTION, POWDER, LYOPHILIZED, FOR SOLUTION INTRAVENOUS; SUBCUTANEOUS
Status: CANCELLED | OUTPATIENT
Start: 2020-06-23

## 2020-06-22 RX ORDER — ONDANSETRON 4 MG/1
8 TABLET, ORALLY DISINTEGRATING ORAL ONCE
Status: CANCELLED
Start: 2020-06-24

## 2020-06-22 RX ORDER — ONDANSETRON 4 MG/1
8 TABLET, ORALLY DISINTEGRATING ORAL ONCE
Status: CANCELLED
Start: 2020-06-23

## 2020-06-22 RX ORDER — AZACITIDINE 100 MG/1
75 INJECTION, POWDER, LYOPHILIZED, FOR SOLUTION INTRAVENOUS; SUBCUTANEOUS
Status: CANCELLED | OUTPATIENT
Start: 2020-06-25

## 2020-06-22 RX ORDER — AZACITIDINE 100 MG/1
75 INJECTION, POWDER, LYOPHILIZED, FOR SOLUTION INTRAVENOUS; SUBCUTANEOUS
Status: CANCELLED | OUTPATIENT
Start: 2020-06-22

## 2020-06-22 RX ORDER — ONDANSETRON 4 MG/1
8 TABLET, ORALLY DISINTEGRATING ORAL ONCE
Status: CANCELLED
Start: 2020-06-26

## 2020-06-22 RX ORDER — AZACITIDINE 100 MG/1
75 INJECTION, POWDER, LYOPHILIZED, FOR SOLUTION INTRAVENOUS; SUBCUTANEOUS
Status: CANCELLED | OUTPATIENT
Start: 2020-06-26

## 2020-06-22 RX ADMIN — AZACITIDINE 150 MG: 100 INJECTION, POWDER, LYOPHILIZED, FOR SOLUTION INTRAVENOUS; SUBCUTANEOUS at 02:06

## 2020-06-22 NOTE — Clinical Note
- Please schedule CBC and type and screen twice weekly on Mondays and Thursdays for possible transfusions. Schedule through month of July.  - Schedule return visit with CBC, type and screen, CMP, and appt with Dr. Valdes or NP 07/20/20  - Schedule for rapid covid with lab appointment  07/16/20  - Schedule chemo chair for cycle 16 vidaza 07/20/20-07/24/20

## 2020-06-22 NOTE — LETTER
June 22, 2020      Gita Valdes MD  [address]  [facility] [address]           [facility]  [address]  [facility] [address]  Phone: [phone]          Patient: Susan Puente   MR Number: [id number]   YOB: 1950   Date of Visit: 6/22/2020       Dear Dr. Gita Valdes:    Thank you for referring Susan Puente to me for evaluation. Attached you will find relevant portions of my assessment and plan of care.    If you have questions, please do not hesitate to call me. I look forward to following Susan Puente along with you.    Sincerely,    Eden Salmeron, NP    Enclosure  CC:  No Recipients    If you would like to receive this communication electronically, please contact [email or web address] or [phone] to request more information on [facility] access.    For providers and/or their staff who would like to refer a patient to [facility], please contact us through our one-stop-shop provider referral line, Demetris Ferraro, at [phone].    If you feel you have received this communication in error or would no longer like to receive these types of communications, please e-mail [email or web address]

## 2020-06-23 ENCOUNTER — INFUSION (OUTPATIENT)
Dept: INFUSION THERAPY | Facility: HOSPITAL | Age: 70
End: 2020-06-23
Attending: INTERNAL MEDICINE
Payer: MEDICARE

## 2020-06-23 VITALS
RESPIRATION RATE: 18 BRPM | TEMPERATURE: 100 F | DIASTOLIC BLOOD PRESSURE: 63 MMHG | SYSTOLIC BLOOD PRESSURE: 144 MMHG | HEART RATE: 85 BPM

## 2020-06-23 DIAGNOSIS — D46.C MDS (MYELODYSPLASTIC SYNDROME) WITH 5Q DELETION: Primary | ICD-10-CM

## 2020-06-23 DIAGNOSIS — E87.6 HYPOKALEMIA: ICD-10-CM

## 2020-06-23 PROCEDURE — 96401 CHEMO ANTI-NEOPL SQ/IM: CPT | Mod: HCNC

## 2020-06-23 PROCEDURE — 63600175 PHARM REV CODE 636 W HCPCS: Mod: JG,HCNC | Performed by: INTERNAL MEDICINE

## 2020-06-23 RX ORDER — AZACITIDINE 100 MG/1
75 INJECTION, POWDER, LYOPHILIZED, FOR SOLUTION INTRAVENOUS; SUBCUTANEOUS
Status: COMPLETED | OUTPATIENT
Start: 2020-06-23 | End: 2020-06-23

## 2020-06-23 RX ORDER — ONDANSETRON 4 MG/1
8 TABLET, ORALLY DISINTEGRATING ORAL ONCE
Status: DISCONTINUED | OUTPATIENT
Start: 2020-06-23 | End: 2020-06-23 | Stop reason: HOSPADM

## 2020-06-23 RX ADMIN — AZACITIDINE 150 MG: 100 INJECTION, POWDER, LYOPHILIZED, FOR SOLUTION INTRAVENOUS; SUBCUTANEOUS at 11:06

## 2020-06-24 ENCOUNTER — INFUSION (OUTPATIENT)
Dept: INFUSION THERAPY | Facility: HOSPITAL | Age: 70
End: 2020-06-24
Attending: INTERNAL MEDICINE
Payer: MEDICARE

## 2020-06-24 VITALS — HEART RATE: 88 BPM | DIASTOLIC BLOOD PRESSURE: 63 MMHG | SYSTOLIC BLOOD PRESSURE: 164 MMHG | TEMPERATURE: 99 F

## 2020-06-24 DIAGNOSIS — D46.C MDS (MYELODYSPLASTIC SYNDROME) WITH 5Q DELETION: Primary | ICD-10-CM

## 2020-06-24 DIAGNOSIS — E87.6 HYPOKALEMIA: ICD-10-CM

## 2020-06-24 PROCEDURE — 63600175 PHARM REV CODE 636 W HCPCS: Mod: JG,HCNC | Performed by: INTERNAL MEDICINE

## 2020-06-24 PROCEDURE — 96401 CHEMO ANTI-NEOPL SQ/IM: CPT | Mod: HCNC

## 2020-06-24 RX ORDER — AZACITIDINE 100 MG/1
75 INJECTION, POWDER, LYOPHILIZED, FOR SOLUTION INTRAVENOUS; SUBCUTANEOUS
Status: COMPLETED | OUTPATIENT
Start: 2020-06-24 | End: 2020-06-24

## 2020-06-24 RX ORDER — ONDANSETRON 4 MG/1
8 TABLET, ORALLY DISINTEGRATING ORAL ONCE
Status: DISCONTINUED | OUTPATIENT
Start: 2020-06-24 | End: 2020-06-24 | Stop reason: HOSPADM

## 2020-06-24 RX ADMIN — AZACITIDINE 150 MG: 100 INJECTION, POWDER, LYOPHILIZED, FOR SOLUTION INTRAVENOUS; SUBCUTANEOUS at 11:06

## 2020-06-24 NOTE — NURSING
Patient here for vidaza injections-given in 3 divided doses-patient feeling weak and complaining of joint pains-tolerated well.

## 2020-06-25 ENCOUNTER — INFUSION (OUTPATIENT)
Dept: INFUSION THERAPY | Facility: HOSPITAL | Age: 70
End: 2020-06-25
Attending: INTERNAL MEDICINE
Payer: MEDICARE

## 2020-06-25 DIAGNOSIS — D64.9 ANEMIA REQUIRING TRANSFUSIONS: ICD-10-CM

## 2020-06-25 DIAGNOSIS — D46.C MDS (MYELODYSPLASTIC SYNDROME) WITH 5Q DELETION: Primary | ICD-10-CM

## 2020-06-25 DIAGNOSIS — E87.6 HYPOKALEMIA: ICD-10-CM

## 2020-06-25 DIAGNOSIS — D46.9 MYELODYSPLASTIC SYNDROME: Primary | ICD-10-CM

## 2020-06-25 PROCEDURE — 63600175 PHARM REV CODE 636 W HCPCS: Mod: JW,JG,HCNC | Performed by: INTERNAL MEDICINE

## 2020-06-25 PROCEDURE — 96401 CHEMO ANTI-NEOPL SQ/IM: CPT | Mod: HCNC

## 2020-06-25 RX ORDER — ONDANSETRON 4 MG/1
8 TABLET, ORALLY DISINTEGRATING ORAL ONCE
Status: DISCONTINUED | OUTPATIENT
Start: 2020-06-25 | End: 2020-06-25 | Stop reason: HOSPADM

## 2020-06-25 RX ORDER — DIPHENHYDRAMINE HCL 25 MG
25 CAPSULE ORAL
Status: CANCELLED | OUTPATIENT
Start: 2020-06-25

## 2020-06-25 RX ORDER — ACETAMINOPHEN 325 MG/1
650 TABLET ORAL
Status: CANCELLED | OUTPATIENT
Start: 2020-06-25

## 2020-06-25 RX ORDER — AZACITIDINE 100 MG/1
75 INJECTION, POWDER, LYOPHILIZED, FOR SOLUTION INTRAVENOUS; SUBCUTANEOUS
Status: COMPLETED | OUTPATIENT
Start: 2020-06-25 | End: 2020-06-25

## 2020-06-25 RX ORDER — HYDROCODONE BITARTRATE AND ACETAMINOPHEN 500; 5 MG/1; MG/1
TABLET ORAL ONCE
Status: CANCELLED | OUTPATIENT
Start: 2020-06-25 | End: 2020-06-25

## 2020-06-25 RX ADMIN — AZACITIDINE 150 MG: 100 INJECTION, POWDER, LYOPHILIZED, FOR SOLUTION INTRAVENOUS; SUBCUTANEOUS at 12:06

## 2020-06-26 ENCOUNTER — INFUSION (OUTPATIENT)
Dept: INFUSION THERAPY | Facility: HOSPITAL | Age: 70
End: 2020-06-26
Attending: INTERNAL MEDICINE
Payer: MEDICARE

## 2020-06-26 VITALS
HEART RATE: 74 BPM | RESPIRATION RATE: 18 BRPM | DIASTOLIC BLOOD PRESSURE: 77 MMHG | SYSTOLIC BLOOD PRESSURE: 164 MMHG | TEMPERATURE: 96 F

## 2020-06-26 DIAGNOSIS — D46.9 MYELODYSPLASTIC SYNDROME: Primary | ICD-10-CM

## 2020-06-26 DIAGNOSIS — D46.9 MDS (MYELODYSPLASTIC SYNDROME): ICD-10-CM

## 2020-06-26 DIAGNOSIS — D46.C MDS (MYELODYSPLASTIC SYNDROME) WITH 5Q DELETION: ICD-10-CM

## 2020-06-26 DIAGNOSIS — D64.9 ANEMIA REQUIRING TRANSFUSIONS: ICD-10-CM

## 2020-06-26 DIAGNOSIS — E87.6 HYPOKALEMIA: ICD-10-CM

## 2020-06-26 PROCEDURE — 25000003 PHARM REV CODE 250: Mod: HCNC | Performed by: INTERNAL MEDICINE

## 2020-06-26 PROCEDURE — P9040 RBC LEUKOREDUCED IRRADIATED: HCPCS | Mod: HCNC

## 2020-06-26 PROCEDURE — 36430 TRANSFUSION BLD/BLD COMPNT: CPT | Mod: HCNC

## 2020-06-26 PROCEDURE — A4216 STERILE WATER/SALINE, 10 ML: HCPCS | Mod: HCNC | Performed by: INTERNAL MEDICINE

## 2020-06-26 PROCEDURE — 63600175 PHARM REV CODE 636 W HCPCS: Mod: JG,HCNC | Performed by: INTERNAL MEDICINE

## 2020-06-26 PROCEDURE — 86922 COMPATIBILITY TEST ANTIGLOB: CPT | Mod: HCNC

## 2020-06-26 PROCEDURE — 25000003 PHARM REV CODE 250: Mod: HCNC | Performed by: NURSE PRACTITIONER

## 2020-06-26 PROCEDURE — 96401 CHEMO ANTI-NEOPL SQ/IM: CPT | Mod: HCNC

## 2020-06-26 RX ORDER — ACETAMINOPHEN 325 MG/1
650 TABLET ORAL
Status: COMPLETED | OUTPATIENT
Start: 2020-06-26 | End: 2020-06-26

## 2020-06-26 RX ORDER — HEPARIN 100 UNIT/ML
500 SYRINGE INTRAVENOUS
Status: COMPLETED | OUTPATIENT
Start: 2020-06-26 | End: 2020-06-26

## 2020-06-26 RX ORDER — ONDANSETRON 4 MG/1
8 TABLET, ORALLY DISINTEGRATING ORAL ONCE
Status: DISCONTINUED | OUTPATIENT
Start: 2020-06-26 | End: 2020-06-26 | Stop reason: HOSPADM

## 2020-06-26 RX ORDER — SODIUM CHLORIDE 0.9 % (FLUSH) 0.9 %
10 SYRINGE (ML) INJECTION
Status: CANCELLED | OUTPATIENT
Start: 2020-06-26

## 2020-06-26 RX ORDER — SODIUM CHLORIDE 0.9 % (FLUSH) 0.9 %
10 SYRINGE (ML) INJECTION
Status: COMPLETED | OUTPATIENT
Start: 2020-06-26 | End: 2020-06-26

## 2020-06-26 RX ORDER — HEPARIN 100 UNIT/ML
500 SYRINGE INTRAVENOUS
Status: CANCELLED | OUTPATIENT
Start: 2020-06-26

## 2020-06-26 RX ORDER — HYDROCODONE BITARTRATE AND ACETAMINOPHEN 500; 5 MG/1; MG/1
TABLET ORAL ONCE
Status: COMPLETED | OUTPATIENT
Start: 2020-06-26 | End: 2020-06-26

## 2020-06-26 RX ORDER — AZACITIDINE 100 MG/1
75 INJECTION, POWDER, LYOPHILIZED, FOR SOLUTION INTRAVENOUS; SUBCUTANEOUS
Status: COMPLETED | OUTPATIENT
Start: 2020-06-26 | End: 2020-06-26

## 2020-06-26 RX ORDER — DIPHENHYDRAMINE HCL 25 MG
25 CAPSULE ORAL
Status: COMPLETED | OUTPATIENT
Start: 2020-06-26 | End: 2020-06-26

## 2020-06-26 RX ADMIN — ACETAMINOPHEN 650 MG: 325 TABLET ORAL at 11:06

## 2020-06-26 RX ADMIN — HEPARIN 500 UNITS: 100 SYRINGE at 01:06

## 2020-06-26 RX ADMIN — Medication 10 ML: at 01:06

## 2020-06-26 RX ADMIN — AZACITIDINE 150 MG: 100 INJECTION, POWDER, LYOPHILIZED, FOR SOLUTION INTRAVENOUS; SUBCUTANEOUS at 11:06

## 2020-06-26 RX ADMIN — DIPHENHYDRAMINE HYDROCHLORIDE 25 MG: 25 CAPSULE ORAL at 11:06

## 2020-06-26 RX ADMIN — SODIUM CHLORIDE: 9 INJECTION, SOLUTION INTRAVENOUS at 11:06

## 2020-06-26 NOTE — PLAN OF CARE
1400 patient completed and tolerated vidaza inj to abd and 1u PRBCs. VSS, NAD noted. Pt voiced no new complaints or concerns at this time. Pt d/c home.

## 2020-06-29 ENCOUNTER — LAB VISIT (OUTPATIENT)
Dept: LAB | Facility: HOSPITAL | Age: 70
End: 2020-06-29
Attending: INTERNAL MEDICINE
Payer: MEDICARE

## 2020-06-29 DIAGNOSIS — D64.9 ANEMIA REQUIRING TRANSFUSIONS: ICD-10-CM

## 2020-06-29 DIAGNOSIS — D46.C MDS (MYELODYSPLASTIC SYNDROME) WITH 5Q DELETION: ICD-10-CM

## 2020-06-29 DIAGNOSIS — D46.9 MYELODYSPLASTIC SYNDROME: ICD-10-CM

## 2020-06-29 LAB
ABO + RH BLD: NORMAL
ANISOCYTOSIS BLD QL SMEAR: ABNORMAL
BASOPHILS # BLD AUTO: ABNORMAL K/UL (ref 0–0.2)
BASOPHILS NFR BLD: 0 % (ref 0–1.9)
BLD GP AB SCN CELLS X3 SERPL QL: NORMAL
DIFFERENTIAL METHOD: ABNORMAL
EOSINOPHIL # BLD AUTO: ABNORMAL K/UL (ref 0–0.5)
EOSINOPHIL NFR BLD: 4 % (ref 0–8)
ERYTHROCYTE [DISTWIDTH] IN BLOOD BY AUTOMATED COUNT: 17.2 % (ref 11.5–14.5)
HCT VFR BLD AUTO: 23.5 % (ref 37–48.5)
HGB BLD-MCNC: 7.5 G/DL (ref 12–16)
IMM GRANULOCYTES # BLD AUTO: ABNORMAL K/UL (ref 0–0.04)
IMM GRANULOCYTES NFR BLD AUTO: ABNORMAL % (ref 0–0.5)
LYMPHOCYTES # BLD AUTO: ABNORMAL K/UL (ref 1–4.8)
LYMPHOCYTES NFR BLD: 46 % (ref 18–48)
MCH RBC QN AUTO: 29.9 PG (ref 27–31)
MCHC RBC AUTO-ENTMCNC: 31.9 G/DL (ref 32–36)
MCV RBC AUTO: 94 FL (ref 82–98)
MONOCYTES # BLD AUTO: ABNORMAL K/UL (ref 0.3–1)
MONOCYTES NFR BLD: 2 % (ref 4–15)
NEUTROPHILS # BLD AUTO: ABNORMAL K/UL (ref 1.8–7.7)
NEUTROPHILS NFR BLD: 48 % (ref 38–73)
NRBC BLD-RTO: 0 /100 WBC
PLATELET # BLD AUTO: 128 K/UL (ref 150–350)
PLATELET BLD QL SMEAR: ABNORMAL
PMV BLD AUTO: 11.2 FL (ref 9.2–12.9)
RBC # BLD AUTO: 2.51 M/UL (ref 4–5.4)
WBC # BLD AUTO: 2.52 K/UL (ref 3.9–12.7)

## 2020-06-29 PROCEDURE — 36415 COLL VENOUS BLD VENIPUNCTURE: CPT | Mod: HCNC

## 2020-06-29 PROCEDURE — 85027 COMPLETE CBC AUTOMATED: CPT | Mod: HCNC

## 2020-06-29 PROCEDURE — 85007 BL SMEAR W/DIFF WBC COUNT: CPT | Mod: HCNC

## 2020-06-29 PROCEDURE — 86850 RBC ANTIBODY SCREEN: CPT | Mod: HCNC

## 2020-07-02 ENCOUNTER — LAB VISIT (OUTPATIENT)
Dept: LAB | Facility: HOSPITAL | Age: 70
End: 2020-07-02
Attending: INTERNAL MEDICINE
Payer: MEDICARE

## 2020-07-02 DIAGNOSIS — D46.C MDS (MYELODYSPLASTIC SYNDROME) WITH 5Q DELETION: ICD-10-CM

## 2020-07-02 DIAGNOSIS — D64.9 ANEMIA REQUIRING TRANSFUSIONS: ICD-10-CM

## 2020-07-02 DIAGNOSIS — D46.9 MYELODYSPLASTIC SYNDROME: ICD-10-CM

## 2020-07-02 LAB
ABO + RH BLD: NORMAL
ANISOCYTOSIS BLD QL SMEAR: SLIGHT
BASOPHILS # BLD AUTO: 0.01 K/UL (ref 0–0.2)
BASOPHILS NFR BLD: 0.5 % (ref 0–1.9)
BLD GP AB SCN CELLS X3 SERPL QL: NORMAL
DIFFERENTIAL METHOD: ABNORMAL
EOSINOPHIL # BLD AUTO: 0.1 K/UL (ref 0–0.5)
EOSINOPHIL NFR BLD: 3.9 % (ref 0–8)
ERYTHROCYTE [DISTWIDTH] IN BLOOD BY AUTOMATED COUNT: 17.1 % (ref 11.5–14.5)
HCT VFR BLD AUTO: 21.6 % (ref 37–48.5)
HGB BLD-MCNC: 7 G/DL (ref 12–16)
IMM GRANULOCYTES # BLD AUTO: 0.03 K/UL (ref 0–0.04)
IMM GRANULOCYTES NFR BLD AUTO: 1.4 % (ref 0–0.5)
LYMPHOCYTES # BLD AUTO: 1 K/UL (ref 1–4.8)
LYMPHOCYTES NFR BLD: 46.9 % (ref 18–48)
MCH RBC QN AUTO: 30.2 PG (ref 27–31)
MCHC RBC AUTO-ENTMCNC: 32.4 G/DL (ref 32–36)
MCV RBC AUTO: 93 FL (ref 82–98)
MONOCYTES # BLD AUTO: 0.1 K/UL (ref 0.3–1)
MONOCYTES NFR BLD: 4.3 % (ref 4–15)
NEUTROPHILS # BLD AUTO: 0.9 K/UL (ref 1.8–7.7)
NEUTROPHILS NFR BLD: 43 % (ref 38–73)
NRBC BLD-RTO: 0 /100 WBC
PLATELET # BLD AUTO: 99 K/UL (ref 150–350)
PLATELET BLD QL SMEAR: ABNORMAL
PMV BLD AUTO: 11.2 FL (ref 9.2–12.9)
RBC # BLD AUTO: 2.32 M/UL (ref 4–5.4)
WBC # BLD AUTO: 2.07 K/UL (ref 3.9–12.7)

## 2020-07-02 PROCEDURE — 36415 COLL VENOUS BLD VENIPUNCTURE: CPT | Mod: HCNC

## 2020-07-02 PROCEDURE — 86901 BLOOD TYPING SEROLOGIC RH(D): CPT | Mod: HCNC

## 2020-07-02 PROCEDURE — 85025 COMPLETE CBC W/AUTO DIFF WBC: CPT | Mod: HCNC

## 2020-07-06 ENCOUNTER — LAB VISIT (OUTPATIENT)
Dept: LAB | Facility: HOSPITAL | Age: 70
End: 2020-07-06
Attending: INTERNAL MEDICINE
Payer: MEDICARE

## 2020-07-06 DIAGNOSIS — D64.9 ANEMIA REQUIRING TRANSFUSIONS: ICD-10-CM

## 2020-07-06 DIAGNOSIS — D46.C MDS (MYELODYSPLASTIC SYNDROME) WITH 5Q DELETION: ICD-10-CM

## 2020-07-06 DIAGNOSIS — D46.9 MYELODYSPLASTIC SYNDROME: Primary | ICD-10-CM

## 2020-07-06 DIAGNOSIS — D46.9 MYELODYSPLASTIC SYNDROME: ICD-10-CM

## 2020-07-06 LAB
ABO + RH BLD: NORMAL
BASOPHILS # BLD AUTO: 0.01 K/UL (ref 0–0.2)
BASOPHILS NFR BLD: 0.4 % (ref 0–1.9)
BLD GP AB SCN CELLS X3 SERPL QL: NORMAL
DIFFERENTIAL METHOD: ABNORMAL
EOSINOPHIL # BLD AUTO: 0.1 K/UL (ref 0–0.5)
EOSINOPHIL NFR BLD: 3.3 % (ref 0–8)
ERYTHROCYTE [DISTWIDTH] IN BLOOD BY AUTOMATED COUNT: 17.2 % (ref 11.5–14.5)
HCT VFR BLD AUTO: 20.8 % (ref 37–48.5)
HGB BLD-MCNC: 6.5 G/DL (ref 12–16)
IMM GRANULOCYTES # BLD AUTO: 0.02 K/UL (ref 0–0.04)
IMM GRANULOCYTES NFR BLD AUTO: 0.8 % (ref 0–0.5)
LYMPHOCYTES # BLD AUTO: 1.1 K/UL (ref 1–4.8)
LYMPHOCYTES NFR BLD: 46.9 % (ref 18–48)
MCH RBC QN AUTO: 30 PG (ref 27–31)
MCHC RBC AUTO-ENTMCNC: 31.3 G/DL (ref 32–36)
MCV RBC AUTO: 96 FL (ref 82–98)
MONOCYTES # BLD AUTO: 0.1 K/UL (ref 0.3–1)
MONOCYTES NFR BLD: 2.9 % (ref 4–15)
NEUTROPHILS # BLD AUTO: 1.1 K/UL (ref 1.8–7.7)
NEUTROPHILS NFR BLD: 45.7 % (ref 38–73)
NRBC BLD-RTO: 0 /100 WBC
PLATELET # BLD AUTO: 73 K/UL (ref 150–350)
PMV BLD AUTO: 11.8 FL (ref 9.2–12.9)
RBC # BLD AUTO: 2.17 M/UL (ref 4–5.4)
WBC # BLD AUTO: 2.41 K/UL (ref 3.9–12.7)

## 2020-07-06 PROCEDURE — 27201040 HC RC 50 FILTER: Mod: HCNC

## 2020-07-06 PROCEDURE — 86901 BLOOD TYPING SEROLOGIC RH(D): CPT | Mod: HCNC

## 2020-07-06 PROCEDURE — 86902 BLOOD TYPE ANTIGEN DONOR EA: CPT | Mod: HCNC,59

## 2020-07-06 PROCEDURE — 36415 COLL VENOUS BLD VENIPUNCTURE: CPT | Mod: HCNC

## 2020-07-06 PROCEDURE — 86922 COMPATIBILITY TEST ANTIGLOB: CPT | Mod: HCNC

## 2020-07-06 PROCEDURE — 85025 COMPLETE CBC W/AUTO DIFF WBC: CPT | Mod: HCNC

## 2020-07-06 RX ORDER — DIPHENHYDRAMINE HCL 25 MG
25 CAPSULE ORAL
Status: CANCELLED | OUTPATIENT
Start: 2020-07-06

## 2020-07-06 RX ORDER — HYDROCODONE BITARTRATE AND ACETAMINOPHEN 500; 5 MG/1; MG/1
TABLET ORAL ONCE
Status: CANCELLED | OUTPATIENT
Start: 2020-07-06 | End: 2020-07-06

## 2020-07-06 RX ORDER — ACETAMINOPHEN 325 MG/1
650 TABLET ORAL
Status: CANCELLED | OUTPATIENT
Start: 2020-07-06

## 2020-07-07 ENCOUNTER — INFUSION (OUTPATIENT)
Dept: INFUSION THERAPY | Facility: HOSPITAL | Age: 70
End: 2020-07-07
Attending: INTERNAL MEDICINE
Payer: MEDICARE

## 2020-07-07 VITALS
RESPIRATION RATE: 18 BRPM | SYSTOLIC BLOOD PRESSURE: 156 MMHG | DIASTOLIC BLOOD PRESSURE: 62 MMHG | TEMPERATURE: 97 F | HEART RATE: 77 BPM

## 2020-07-07 DIAGNOSIS — D46.9 MYELODYSPLASTIC SYNDROME: ICD-10-CM

## 2020-07-07 DIAGNOSIS — D64.9 ANEMIA REQUIRING TRANSFUSIONS: ICD-10-CM

## 2020-07-07 PROCEDURE — 25000003 PHARM REV CODE 250: Mod: HCNC | Performed by: NURSE PRACTITIONER

## 2020-07-07 PROCEDURE — P9038 RBC IRRADIATED: HCPCS | Mod: HCNC

## 2020-07-07 PROCEDURE — 36430 TRANSFUSION BLD/BLD COMPNT: CPT | Mod: HCNC

## 2020-07-07 RX ORDER — ACETAMINOPHEN 325 MG/1
650 TABLET ORAL
Status: COMPLETED | OUTPATIENT
Start: 2020-07-07 | End: 2020-07-07

## 2020-07-07 RX ORDER — DIPHENHYDRAMINE HCL 25 MG
25 CAPSULE ORAL
Status: COMPLETED | OUTPATIENT
Start: 2020-07-07 | End: 2020-07-07

## 2020-07-07 RX ORDER — HYDROCODONE BITARTRATE AND ACETAMINOPHEN 500; 5 MG/1; MG/1
TABLET ORAL ONCE
Status: COMPLETED | OUTPATIENT
Start: 2020-07-07 | End: 2020-07-07

## 2020-07-07 RX ADMIN — SODIUM CHLORIDE: 9 INJECTION, SOLUTION INTRAVENOUS at 07:07

## 2020-07-07 RX ADMIN — ACETAMINOPHEN 650 MG: 325 TABLET ORAL at 07:07

## 2020-07-07 RX ADMIN — DIPHENHYDRAMINE HYDROCHLORIDE 25 MG: 25 CAPSULE ORAL at 07:07

## 2020-07-13 ENCOUNTER — LAB VISIT (OUTPATIENT)
Dept: LAB | Facility: HOSPITAL | Age: 70
End: 2020-07-13
Attending: INTERNAL MEDICINE
Payer: MEDICARE

## 2020-07-13 DIAGNOSIS — D64.9 ANEMIA REQUIRING TRANSFUSIONS: ICD-10-CM

## 2020-07-13 DIAGNOSIS — D46.C MDS (MYELODYSPLASTIC SYNDROME) WITH 5Q DELETION: ICD-10-CM

## 2020-07-13 DIAGNOSIS — D46.9 MYELODYSPLASTIC SYNDROME: ICD-10-CM

## 2020-07-13 DIAGNOSIS — D46.9 MYELODYSPLASTIC SYNDROME: Primary | ICD-10-CM

## 2020-07-13 LAB
ABO + RH BLD: NORMAL
ANISOCYTOSIS BLD QL SMEAR: SLIGHT
BASOPHILS NFR BLD: 1 % (ref 0–1.9)
BLD GP AB SCN CELLS X3 SERPL QL: NORMAL
DIFFERENTIAL METHOD: ABNORMAL
EOSINOPHIL NFR BLD: 7 % (ref 0–8)
ERYTHROCYTE [DISTWIDTH] IN BLOOD BY AUTOMATED COUNT: 17.3 % (ref 11.5–14.5)
HCT VFR BLD AUTO: 23.4 % (ref 37–48.5)
HGB BLD-MCNC: 7.7 G/DL (ref 12–16)
IMM GRANULOCYTES # BLD AUTO: ABNORMAL K/UL (ref 0–0.04)
IMM GRANULOCYTES NFR BLD AUTO: ABNORMAL % (ref 0–0.5)
LYMPHOCYTES NFR BLD: 59 % (ref 18–48)
MCH RBC QN AUTO: 30.8 PG (ref 27–31)
MCHC RBC AUTO-ENTMCNC: 32.9 G/DL (ref 32–36)
MCV RBC AUTO: 94 FL (ref 82–98)
MONOCYTES NFR BLD: 1 % (ref 4–15)
NEUTROPHILS NFR BLD: 32 % (ref 38–73)
NRBC BLD-RTO: 0 /100 WBC
PLATELET # BLD AUTO: 169 K/UL (ref 150–350)
PLATELET BLD QL SMEAR: ABNORMAL
PMV BLD AUTO: 11.4 FL (ref 9.2–12.9)
POIKILOCYTOSIS BLD QL SMEAR: SLIGHT
RBC # BLD AUTO: 2.5 M/UL (ref 4–5.4)
WBC # BLD AUTO: 1.76 K/UL (ref 3.9–12.7)

## 2020-07-13 PROCEDURE — 85027 COMPLETE CBC AUTOMATED: CPT | Mod: HCNC

## 2020-07-13 PROCEDURE — 86850 RBC ANTIBODY SCREEN: CPT | Mod: HCNC

## 2020-07-13 PROCEDURE — 36415 COLL VENOUS BLD VENIPUNCTURE: CPT | Mod: HCNC

## 2020-07-13 PROCEDURE — 85007 BL SMEAR W/DIFF WBC COUNT: CPT | Mod: HCNC

## 2020-07-13 RX ORDER — DIPHENHYDRAMINE HCL 25 MG
25 CAPSULE ORAL
Status: CANCELLED | OUTPATIENT
Start: 2020-07-13

## 2020-07-13 RX ORDER — ACETAMINOPHEN 325 MG/1
650 TABLET ORAL
Status: CANCELLED | OUTPATIENT
Start: 2020-07-13

## 2020-07-13 RX ORDER — HYDROCODONE BITARTRATE AND ACETAMINOPHEN 500; 5 MG/1; MG/1
TABLET ORAL ONCE
Status: CANCELLED | OUTPATIENT
Start: 2020-07-13 | End: 2020-07-13

## 2020-07-14 ENCOUNTER — INFUSION (OUTPATIENT)
Dept: INFUSION THERAPY | Facility: HOSPITAL | Age: 70
End: 2020-07-14
Attending: INTERNAL MEDICINE
Payer: MEDICARE

## 2020-07-14 VITALS
DIASTOLIC BLOOD PRESSURE: 70 MMHG | TEMPERATURE: 98 F | HEART RATE: 78 BPM | RESPIRATION RATE: 18 BRPM | SYSTOLIC BLOOD PRESSURE: 130 MMHG | OXYGEN SATURATION: 99 %

## 2020-07-14 DIAGNOSIS — D64.9 ANEMIA REQUIRING TRANSFUSIONS: ICD-10-CM

## 2020-07-14 DIAGNOSIS — D46.9 MYELODYSPLASTIC SYNDROME: ICD-10-CM

## 2020-07-14 PROCEDURE — 86922 COMPATIBILITY TEST ANTIGLOB: CPT | Mod: HCNC

## 2020-07-14 PROCEDURE — 25000003 PHARM REV CODE 250: Mod: HCNC | Performed by: INTERNAL MEDICINE

## 2020-07-14 PROCEDURE — 63600175 PHARM REV CODE 636 W HCPCS: Mod: HCNC | Performed by: INTERNAL MEDICINE

## 2020-07-14 PROCEDURE — 36430 TRANSFUSION BLD/BLD COMPNT: CPT | Mod: HCNC

## 2020-07-14 PROCEDURE — P9040 RBC LEUKOREDUCED IRRADIATED: HCPCS | Mod: HCNC

## 2020-07-14 RX ORDER — HEPARIN 100 UNIT/ML
500 SYRINGE INTRAVENOUS
Status: COMPLETED | OUTPATIENT
Start: 2020-07-14 | End: 2020-07-14

## 2020-07-14 RX ORDER — ACETAMINOPHEN 325 MG/1
650 TABLET ORAL
Status: COMPLETED | OUTPATIENT
Start: 2020-07-14 | End: 2020-07-14

## 2020-07-14 RX ORDER — DIPHENHYDRAMINE HCL 25 MG
25 CAPSULE ORAL
Status: COMPLETED | OUTPATIENT
Start: 2020-07-14 | End: 2020-07-14

## 2020-07-14 RX ORDER — HYDROCODONE BITARTRATE AND ACETAMINOPHEN 500; 5 MG/1; MG/1
TABLET ORAL ONCE
Status: COMPLETED | OUTPATIENT
Start: 2020-07-14 | End: 2020-07-14

## 2020-07-14 RX ADMIN — SODIUM CHLORIDE: 9 INJECTION, SOLUTION INTRAVENOUS at 02:07

## 2020-07-14 RX ADMIN — DIPHENHYDRAMINE HYDROCHLORIDE 25 MG: 25 CAPSULE ORAL at 02:07

## 2020-07-14 RX ADMIN — HEPARIN 500 UNITS: 100 SYRINGE at 05:07

## 2020-07-14 RX ADMIN — ACETAMINOPHEN 650 MG: 325 TABLET ORAL at 02:07

## 2020-07-15 DIAGNOSIS — K21.9 GASTROESOPHAGEAL REFLUX DISEASE WITHOUT ESOPHAGITIS: ICD-10-CM

## 2020-07-15 DIAGNOSIS — F32.A DEPRESSION, UNSPECIFIED DEPRESSION TYPE: ICD-10-CM

## 2020-07-16 RX ORDER — PANTOPRAZOLE SODIUM 40 MG/1
40 TABLET, DELAYED RELEASE ORAL DAILY
Qty: 30 TABLET | Refills: 1 | Status: SHIPPED | OUTPATIENT
Start: 2020-07-16 | End: 2020-10-06 | Stop reason: SDUPTHER

## 2020-07-16 RX ORDER — CITALOPRAM 20 MG/1
20 TABLET, FILM COATED ORAL DAILY
Qty: 30 TABLET | Refills: 2 | Status: SHIPPED | OUTPATIENT
Start: 2020-07-16 | End: 2020-11-20 | Stop reason: SDUPTHER

## 2020-07-20 ENCOUNTER — INFUSION (OUTPATIENT)
Dept: INFUSION THERAPY | Facility: HOSPITAL | Age: 70
End: 2020-07-20
Attending: INTERNAL MEDICINE
Payer: MEDICARE

## 2020-07-20 ENCOUNTER — OFFICE VISIT (OUTPATIENT)
Dept: HEMATOLOGY/ONCOLOGY | Facility: CLINIC | Age: 70
End: 2020-07-20
Payer: MEDICARE

## 2020-07-20 VITALS
DIASTOLIC BLOOD PRESSURE: 73 MMHG | SYSTOLIC BLOOD PRESSURE: 171 MMHG | TEMPERATURE: 98 F | RESPIRATION RATE: 20 BRPM | HEART RATE: 80 BPM

## 2020-07-20 VITALS
RESPIRATION RATE: 17 BRPM | WEIGHT: 174.19 LBS | HEIGHT: 66 IN | OXYGEN SATURATION: 97 % | DIASTOLIC BLOOD PRESSURE: 71 MMHG | SYSTOLIC BLOOD PRESSURE: 170 MMHG | BODY MASS INDEX: 27.99 KG/M2 | HEART RATE: 80 BPM | TEMPERATURE: 98 F

## 2020-07-20 DIAGNOSIS — D46.9 MYELODYSPLASTIC SYNDROME: Primary | ICD-10-CM

## 2020-07-20 DIAGNOSIS — I10 ESSENTIAL HYPERTENSION: ICD-10-CM

## 2020-07-20 DIAGNOSIS — E87.6 HYPOKALEMIA: ICD-10-CM

## 2020-07-20 DIAGNOSIS — D61.818 PANCYTOPENIA: ICD-10-CM

## 2020-07-20 DIAGNOSIS — D70.8 OTHER NEUTROPENIA: ICD-10-CM

## 2020-07-20 DIAGNOSIS — D46.C MDS (MYELODYSPLASTIC SYNDROME) WITH 5Q DELETION: Primary | ICD-10-CM

## 2020-07-20 DIAGNOSIS — E11.22 CONTROLLED TYPE 2 DIABETES MELLITUS WITH STAGE 3 CHRONIC KIDNEY DISEASE, WITHOUT LONG-TERM CURRENT USE OF INSULIN: ICD-10-CM

## 2020-07-20 DIAGNOSIS — N18.30 CONTROLLED TYPE 2 DIABETES MELLITUS WITH STAGE 3 CHRONIC KIDNEY DISEASE, WITHOUT LONG-TERM CURRENT USE OF INSULIN: ICD-10-CM

## 2020-07-20 DIAGNOSIS — F43.23 ADJUSTMENT DISORDER WITH MIXED ANXIETY AND DEPRESSED MOOD: ICD-10-CM

## 2020-07-20 DIAGNOSIS — D64.9 ANEMIA REQUIRING TRANSFUSIONS: ICD-10-CM

## 2020-07-20 DIAGNOSIS — I25.10 CORONARY ARTERY DISEASE, ANGINA PRESENCE UNSPECIFIED, UNSPECIFIED VESSEL OR LESION TYPE, UNSPECIFIED WHETHER NATIVE OR TRANSPLANTED HEART: ICD-10-CM

## 2020-07-20 PROCEDURE — 1101F PT FALLS ASSESS-DOCD LE1/YR: CPT | Mod: HCNC,CPTII,S$GLB, | Performed by: NURSE PRACTITIONER

## 2020-07-20 PROCEDURE — 3044F PR MOST RECENT HEMOGLOBIN A1C LEVEL <7.0%: ICD-10-PCS | Mod: HCNC,CPTII,S$GLB, | Performed by: NURSE PRACTITIONER

## 2020-07-20 PROCEDURE — 1159F MED LIST DOCD IN RCRD: CPT | Mod: HCNC,S$GLB,, | Performed by: NURSE PRACTITIONER

## 2020-07-20 PROCEDURE — 96401 CHEMO ANTI-NEOPL SQ/IM: CPT | Mod: HCNC

## 2020-07-20 PROCEDURE — 63600175 PHARM REV CODE 636 W HCPCS: Mod: JG,HCNC | Performed by: INTERNAL MEDICINE

## 2020-07-20 PROCEDURE — 1101F PR PT FALLS ASSESS DOC 0-1 FALLS W/OUT INJ PAST YR: ICD-10-PCS | Mod: HCNC,CPTII,S$GLB, | Performed by: NURSE PRACTITIONER

## 2020-07-20 PROCEDURE — 1126F AMNT PAIN NOTED NONE PRSNT: CPT | Mod: HCNC,S$GLB,, | Performed by: NURSE PRACTITIONER

## 2020-07-20 PROCEDURE — 3077F PR MOST RECENT SYSTOLIC BLOOD PRESSURE >= 140 MM HG: ICD-10-PCS | Mod: HCNC,CPTII,S$GLB, | Performed by: NURSE PRACTITIONER

## 2020-07-20 PROCEDURE — 3078F PR MOST RECENT DIASTOLIC BLOOD PRESSURE < 80 MM HG: ICD-10-PCS | Mod: HCNC,CPTII,S$GLB, | Performed by: NURSE PRACTITIONER

## 2020-07-20 PROCEDURE — 99214 PR OFFICE/OUTPT VISIT, EST, LEVL IV, 30-39 MIN: ICD-10-PCS | Mod: HCNC,S$GLB,, | Performed by: NURSE PRACTITIONER

## 2020-07-20 PROCEDURE — 99999 PR PBB SHADOW E&M-EST. PATIENT-LVL V: ICD-10-PCS | Mod: PBBFAC,HCNC,, | Performed by: NURSE PRACTITIONER

## 2020-07-20 PROCEDURE — 99214 OFFICE O/P EST MOD 30 MIN: CPT | Mod: HCNC,S$GLB,, | Performed by: NURSE PRACTITIONER

## 2020-07-20 PROCEDURE — 3044F HG A1C LEVEL LT 7.0%: CPT | Mod: HCNC,CPTII,S$GLB, | Performed by: NURSE PRACTITIONER

## 2020-07-20 PROCEDURE — 99999 PR PBB SHADOW E&M-EST. PATIENT-LVL V: CPT | Mod: PBBFAC,HCNC,, | Performed by: NURSE PRACTITIONER

## 2020-07-20 PROCEDURE — 3008F BODY MASS INDEX DOCD: CPT | Mod: HCNC,CPTII,S$GLB, | Performed by: NURSE PRACTITIONER

## 2020-07-20 PROCEDURE — 3077F SYST BP >= 140 MM HG: CPT | Mod: HCNC,CPTII,S$GLB, | Performed by: NURSE PRACTITIONER

## 2020-07-20 PROCEDURE — 3008F PR BODY MASS INDEX (BMI) DOCUMENTED: ICD-10-PCS | Mod: HCNC,CPTII,S$GLB, | Performed by: NURSE PRACTITIONER

## 2020-07-20 PROCEDURE — 3078F DIAST BP <80 MM HG: CPT | Mod: HCNC,CPTII,S$GLB, | Performed by: NURSE PRACTITIONER

## 2020-07-20 PROCEDURE — 1126F PR PAIN SEVERITY QUANTIFIED, NO PAIN PRESENT: ICD-10-PCS | Mod: HCNC,S$GLB,, | Performed by: NURSE PRACTITIONER

## 2020-07-20 PROCEDURE — 1159F PR MEDICATION LIST DOCUMENTED IN MEDICAL RECORD: ICD-10-PCS | Mod: HCNC,S$GLB,, | Performed by: NURSE PRACTITIONER

## 2020-07-20 RX ORDER — ONDANSETRON 4 MG/1
8 TABLET, ORALLY DISINTEGRATING ORAL ONCE
Status: CANCELLED
Start: 2020-07-20

## 2020-07-20 RX ORDER — TURMERIC 400 MG
1 CAPSULE ORAL DAILY
Start: 2020-07-20 | End: 2022-01-01

## 2020-07-20 RX ORDER — ONDANSETRON 4 MG/1
8 TABLET, ORALLY DISINTEGRATING ORAL ONCE
Status: CANCELLED | OUTPATIENT
Start: 2020-07-24

## 2020-07-20 RX ORDER — ONDANSETRON 4 MG/1
8 TABLET, ORALLY DISINTEGRATING ORAL ONCE
Status: CANCELLED | OUTPATIENT
Start: 2020-07-21

## 2020-07-20 RX ORDER — ONDANSETRON 4 MG/1
8 TABLET, ORALLY DISINTEGRATING ORAL ONCE
Status: CANCELLED | OUTPATIENT
Start: 2020-07-22

## 2020-07-20 RX ORDER — ACYCLOVIR 400 MG/1
400 TABLET ORAL 2 TIMES DAILY
Qty: 60 TABLET | Refills: 6 | Status: SHIPPED | OUTPATIENT
Start: 2020-07-20 | End: 2021-05-12 | Stop reason: SDUPTHER

## 2020-07-20 RX ORDER — ONDANSETRON 4 MG/1
8 TABLET, ORALLY DISINTEGRATING ORAL ONCE
Status: CANCELLED | OUTPATIENT
Start: 2020-07-23

## 2020-07-20 RX ORDER — AZACITIDINE 100 MG/1
75 INJECTION, POWDER, LYOPHILIZED, FOR SOLUTION INTRAVENOUS; SUBCUTANEOUS
Status: CANCELLED | OUTPATIENT
Start: 2020-07-24

## 2020-07-20 RX ORDER — ONDANSETRON 4 MG/1
8 TABLET, ORALLY DISINTEGRATING ORAL ONCE
Status: DISCONTINUED | OUTPATIENT
Start: 2020-07-20 | End: 2020-07-20 | Stop reason: HOSPADM

## 2020-07-20 RX ORDER — AZACITIDINE 100 MG/1
75 INJECTION, POWDER, LYOPHILIZED, FOR SOLUTION INTRAVENOUS; SUBCUTANEOUS
Status: CANCELLED | OUTPATIENT
Start: 2020-07-20

## 2020-07-20 RX ORDER — AZACITIDINE 100 MG/1
75 INJECTION, POWDER, LYOPHILIZED, FOR SOLUTION INTRAVENOUS; SUBCUTANEOUS
Status: COMPLETED | OUTPATIENT
Start: 2020-07-20 | End: 2020-07-20

## 2020-07-20 RX ORDER — AZACITIDINE 100 MG/1
75 INJECTION, POWDER, LYOPHILIZED, FOR SOLUTION INTRAVENOUS; SUBCUTANEOUS
Status: CANCELLED | OUTPATIENT
Start: 2020-07-21

## 2020-07-20 RX ORDER — AZACITIDINE 100 MG/1
75 INJECTION, POWDER, LYOPHILIZED, FOR SOLUTION INTRAVENOUS; SUBCUTANEOUS
Status: CANCELLED | OUTPATIENT
Start: 2020-07-22

## 2020-07-20 RX ORDER — AZACITIDINE 100 MG/1
75 INJECTION, POWDER, LYOPHILIZED, FOR SOLUTION INTRAVENOUS; SUBCUTANEOUS
Status: CANCELLED | OUTPATIENT
Start: 2020-07-23

## 2020-07-20 RX ADMIN — AZACITIDINE 150 MG: 100 INJECTION, POWDER, LYOPHILIZED, FOR SOLUTION INTRAVENOUS; SUBCUTANEOUS at 11:07

## 2020-07-20 NOTE — NURSING
Patient tolerated Vidaza injections SQ into abdomen. VS stable. Labs reviewed. Patient has no complaints at this time. AVS declined. Discharged home.

## 2020-07-20 NOTE — Clinical Note
- Please schedule CBC and type and screen twice weekly on Mondays and Thursdays for possible transfusions. Schedule through month of August.  - Schedule return visit with CBC, type and screen, CMP, and appt with Dr. Valdes or NP 08/17/20  - Schedule chemo chair for cycle 16 vidaza 08/17/20-08/20/20

## 2020-07-20 NOTE — PROGRESS NOTES
Subjective:       Patient ID: Susan Puente is a 69 y.o. female.    Chief Complaint: Follow-up (MDS)    HPI: Patient presents hematology clinic for follow up of her history of MDS 5 q del syndrome prior to cycle 16 Vidaza as third line therapy for 5q minus syndrome. She continues to have disease per recent BM bx 10/2019. Continues with chronic intermittent fatigue. Chronic shoulder pain and leg pain, improved with gabapentin, bio freeze and turmeric. She denies fevers, chills, sob, chest pain, c/d. She reports GERD with occasional nausea and vomiting. She will start C16 today 7/20/2020.    Oncology History   Ms. Puente is a 68 year old female with hx of DM2, peripheral vascular disease, tobacco use, CAD, hyperlipidemia, hypertension who was hospitalized 11/10/16 for anemia. hgb 5.3  MCH 43.4 with normal WBC and platelets. Patient had normal iron stores. She was transfused 3 units of PRBCs and discharged home with hgb 8.7 on 11/11/16. She was referred for further evalutation of her anemia. On 12/16/16 patient had a normal SPEP and immunofixation. Slightly elevated kappa light chains with normal ration. Elevated vitamin b12, normal folate, JAYDEN was positive with a low titer and negative profile. Patient had a bone marrow biopsy 1/5/16 which showed the core biopsy is normocellular for age (40%); however, megakaryocytes are increased and show frequent small, hypolobated forms. Additionally, a subset of the neutrophils are hypogranular. Blasts are not increased by either morphology (1.2%) or in the corresponding flow cytometric analysis. Fish detects a 5q deletion in 54.5% of nuclei. Cytogenetics reported 20 metaphases, 2 metaphases were normal and 18 metaphases had a 5q deletion. No additional cytogenetic abnormalities were detected. Findings consistent with 5q deletion syndrome.     Patient had a delay in obtaining Revlimid 10 mg daily but did start taking the medication 2/8/17. On 2/9/17 patient  developed a diffuse maculopapular rash throughout scalp arms, legs and torso. She states she had no stridor or wheezing. She discontinued medication 2/15/17. Went to allergist who provided references on desensitization so that patient could resume Revlimid. Unfortunately developed pancytopenia and Revlimid stopped 6/16/17. She had a repeat BMBX  performed 6/8/17 and showed a hypercellular marrow (60%) continued atypia in the granulocytes and megakaryocytes were noted.  Additionally, there is erythroid atypia present. No increase in blasts. Cytogenetics are normal and MDS FISH is negative, failing to show 5 q minus. NGS should no significant molecular mutations.  Anemia work up revealed bienvenido negative hemolysis.    Revlimid has been stopped since June 2017 after she developed pancytopenia while on Revlimid (required desensitization). CBC was normal for almost 1 year and marrow was negative for del5q. Bone marrow biopsy repeat from 6/2018 showed relapsed 5q minus MDS without new or additional mutations. Revlimid restarted at 2.5 mg daily with prednisone to prevent allergic reaction. Titrated to a goal of 10mg daily Revlimid. Hospital admission 3/12-3/17/19 for unresponsive event after blood transfusion, found to be profoundly pancytopenic at admission. Since hospital admission Revlimid has been stopped. Repeat marrow March 2019 shows persistent MDS with 5q minus.     Started cycle 1 Vidaza 5/6/19 subq for 5 days every 28 days. Restaging bone marrow biopsy from 10/24/19 shows persistent MDS, no excess blasts and 3 of 20 metaphases with 5q deletion.    Review of Systems   Constitutional: Negative for fever, chills, weight loss, Positive for fatigue.   HENT: Negative for sinus congestion or rhinorrhea. Negative for ear pain, mouth sores, nosebleeds and trouble swallowing.    Respiratory: Negative for cough, shortness of breath and wheezing.    Cardiovascular: Negative for chest pain and leg swelling.   Gastrointestinal:  "Negative for abdominal distention, abdominal pain, blood in stool, positive for nausea and vomiting.   Endocrine: Negative for polyphagia and polyuria.   Genitourinary: Negative for dysuria, hematuria and urgency.   Musculoskeletal: Positive for bilateral shoulder pain, sciatic pain much improved.   Skin: Negative for color change, pallor and rash.   Neurological: Negative for dizziness, weakness, light-headedness and headaches.   Hematological: Negative for adenopathy. Does not bruise/bleed easily.   Psychiatric/Behavioral: Negative for agitation and behavioral problems.     Blood Pressure (Abnormal) 170/71 (BP Location: Left arm, Patient Position: Sitting, BP Method: Large (Automatic))   Pulse 80   Temperature 98.1 °F (36.7 °C)   Respiration 17   Height 5' 5.5" (1.664 m)   Weight 79 kg (174 lb 2.6 oz)   Oxygen Saturation 97%   Body Mass Index 28.54 kg/m²         Objective:   Physical Exam  Constitutional:       Appearance: She is well-developed.   HENT:      Head: Normocephalic and atraumatic.      Mouth/Throat:      Mouth: Mucous membranes are moist. No oral lesions.      Pharynx: Oropharynx is clear.   Eyes:      Conjunctiva/sclera: Conjunctivae normal.   Neck:      Musculoskeletal: Normal range of motion.   Cardiovascular:      Rate and Rhythm: Normal rate and regular rhythm.      Heart sounds: Normal heart sounds.   Pulmonary:      Effort: No respiratory distress.      Breath sounds: Normal breath sounds.   Abdominal:      General: Bowel sounds are normal.      Palpations: Abdomen is soft.   Musculoskeletal: Normal range of motion.   Skin:     General: Skin is warm and dry.   Neurological:      Mental Status: She is alert and oriented to person, place, and time.   Psychiatric:         Behavior: Behavior normal.         Thought Content: Thought content normal.         Judgment: Judgment normal.           Assessment:       1. Myelodysplastic syndrome    2. Essential hypertension    3. Controlled type 2 " diabetes mellitus with stage 3 chronic kidney disease, without long-term current use of insulin    4. Coronary artery disease, angina presence unspecified, unspecified vessel or lesion type, unspecified whether native or transplanted heart    5. Adjustment disorder with mixed anxiety and depressed mood    6. Other neutropenia    7. Anemia requiring transfusions        Plan:     MDS  - Initially with 5 q minus syndrome and treated with Revlimid. Developed allergy and was then desensitized. Soon after developed pancytopenia and Revlimid held since June 2017.    - BMBX on 6/8/17 was with normal cytogenetics. 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 Discussed treatment options of HMA therapy or repeat trial of Revlimid and patient wished to proceed with Revlimid   - Revlimid stopped again due to repeat allergic reaction and pancytopenia 3/2019  - Started cycle 1 Vidaza 5/6/19 subq for 5 days every 28 days  - Restaging bone marrow biopsy following cycle 5 from 10/24/19 shows persistent MDS, no excess blasts and 3 of 20 metaphases with 5q deletion  - Tolerated cycles 1-14 well thus far. Package insert for Vidaza recommends holding if ANC <1000, however pt has been receiving this with an ANC below 1000 for each cycle. Per Dr. Valdes, beverly to continue to give despite neutropenia. Will proceed with C16 today  -had planned to repeat marrow biopsy in April 2020 with sedation but delayed due to COVID.  -Discussed patient about bone marrow biopsy at clinic, patient prefer only under sedation at OR,  aware. Patient will now consider a bone marrow biopsy in clinic.     Cytopenias 2/2 disease/chemotherapy: anemia and neutropenia  - Transfuse for hgb < 7 or plts < 10K  - Continue ppx cipro, acyclovir, and fluconazole  - no indication for transfusion today  - hgb 8.3 and     DM2  - management per PCP  - continue metformin  - Patient will f/u with PCP consider to change metformin to other agent due to recent  recall.     HTN  - management per PCP  - continue home BP regimen    CAD  - continue  Repatha  for HLD per PCP (intolerant to statins)  - continue ASA    Adjustment disorder  - Improved mood on Celexa.    Myalgias/possible sciatica  - started trial of gabapentin 100 mg once a day in AM  - Continue on bio-freeze and turmeric    Insonmia  - using melatonin OTC    Follow up:  - CBC and type and screen twice weekly on Mondays and Thursdays for possible transfusions. Schedule through month of August.  - Schedule return visit with CBC, type and screen, CMP, and appt with Dr. Valdes or NP 08/17/20  - Schedule chemo chair for cycle 16 vidaza 08/17/20-08/20/20     Beatriz King NP  Hematology/Oncology/BMT

## 2020-07-21 ENCOUNTER — INFUSION (OUTPATIENT)
Dept: INFUSION THERAPY | Facility: HOSPITAL | Age: 70
End: 2020-07-21
Attending: INTERNAL MEDICINE
Payer: MEDICARE

## 2020-07-21 VITALS — DIASTOLIC BLOOD PRESSURE: 62 MMHG | HEART RATE: 83 BPM | SYSTOLIC BLOOD PRESSURE: 137 MMHG | TEMPERATURE: 99 F

## 2020-07-21 DIAGNOSIS — D46.C MDS (MYELODYSPLASTIC SYNDROME) WITH 5Q DELETION: Primary | ICD-10-CM

## 2020-07-21 PROCEDURE — 63600175 PHARM REV CODE 636 W HCPCS: Mod: JG,HCNC | Performed by: INTERNAL MEDICINE

## 2020-07-21 PROCEDURE — 96401 CHEMO ANTI-NEOPL SQ/IM: CPT | Mod: HCNC

## 2020-07-21 RX ORDER — AZACITIDINE 100 MG/1
75 INJECTION, POWDER, LYOPHILIZED, FOR SOLUTION INTRAVENOUS; SUBCUTANEOUS
Status: COMPLETED | OUTPATIENT
Start: 2020-07-21 | End: 2020-07-21

## 2020-07-21 RX ADMIN — AZACITIDINE 150 MG: 100 INJECTION, POWDER, LYOPHILIZED, FOR SOLUTION INTRAVENOUS; SUBCUTANEOUS at 10:07

## 2020-07-22 ENCOUNTER — INFUSION (OUTPATIENT)
Dept: INFUSION THERAPY | Facility: HOSPITAL | Age: 70
End: 2020-07-22
Attending: INTERNAL MEDICINE
Payer: MEDICARE

## 2020-07-22 VITALS — SYSTOLIC BLOOD PRESSURE: 139 MMHG | TEMPERATURE: 99 F | DIASTOLIC BLOOD PRESSURE: 63 MMHG | HEART RATE: 80 BPM

## 2020-07-22 DIAGNOSIS — D46.C MDS (MYELODYSPLASTIC SYNDROME) WITH 5Q DELETION: Primary | ICD-10-CM

## 2020-07-22 PROCEDURE — 63600175 PHARM REV CODE 636 W HCPCS: Mod: JG,HCNC | Performed by: INTERNAL MEDICINE

## 2020-07-22 PROCEDURE — 96401 CHEMO ANTI-NEOPL SQ/IM: CPT | Mod: HCNC

## 2020-07-22 RX ORDER — AZACITIDINE 100 MG/1
75 INJECTION, POWDER, LYOPHILIZED, FOR SOLUTION INTRAVENOUS; SUBCUTANEOUS
Status: COMPLETED | OUTPATIENT
Start: 2020-07-22 | End: 2020-07-22

## 2020-07-22 RX ADMIN — AZACITIDINE 150 MG: 100 INJECTION, POWDER, LYOPHILIZED, FOR SOLUTION INTRAVENOUS; SUBCUTANEOUS at 10:07

## 2020-07-23 ENCOUNTER — INFUSION (OUTPATIENT)
Dept: INFUSION THERAPY | Facility: HOSPITAL | Age: 70
End: 2020-07-23
Attending: INTERNAL MEDICINE
Payer: MEDICARE

## 2020-07-23 DIAGNOSIS — D46.C MDS (MYELODYSPLASTIC SYNDROME) WITH 5Q DELETION: Primary | ICD-10-CM

## 2020-07-23 PROCEDURE — 96401 CHEMO ANTI-NEOPL SQ/IM: CPT | Mod: HCNC

## 2020-07-23 PROCEDURE — 63600175 PHARM REV CODE 636 W HCPCS: Mod: JG,HCNC | Performed by: INTERNAL MEDICINE

## 2020-07-23 RX ORDER — AZACITIDINE 100 MG/1
75 INJECTION, POWDER, LYOPHILIZED, FOR SOLUTION INTRAVENOUS; SUBCUTANEOUS
Status: COMPLETED | OUTPATIENT
Start: 2020-07-23 | End: 2020-07-23

## 2020-07-23 RX ADMIN — AZACITIDINE 150 MG: 100 INJECTION, POWDER, LYOPHILIZED, FOR SOLUTION INTRAVENOUS; SUBCUTANEOUS at 11:07

## 2020-07-24 ENCOUNTER — INFUSION (OUTPATIENT)
Dept: INFUSION THERAPY | Facility: HOSPITAL | Age: 70
End: 2020-07-24
Payer: MEDICARE

## 2020-07-24 VITALS
SYSTOLIC BLOOD PRESSURE: 158 MMHG | HEART RATE: 81 BPM | DIASTOLIC BLOOD PRESSURE: 68 MMHG | RESPIRATION RATE: 18 BRPM | TEMPERATURE: 98 F

## 2020-07-24 DIAGNOSIS — D46.C MDS (MYELODYSPLASTIC SYNDROME) WITH 5Q DELETION: Primary | ICD-10-CM

## 2020-07-24 PROCEDURE — 63600175 PHARM REV CODE 636 W HCPCS: Mod: JG,HCNC | Performed by: INTERNAL MEDICINE

## 2020-07-24 PROCEDURE — 96401 CHEMO ANTI-NEOPL SQ/IM: CPT | Mod: HCNC

## 2020-07-24 RX ORDER — AZACITIDINE 100 MG/1
75 INJECTION, POWDER, LYOPHILIZED, FOR SOLUTION INTRAVENOUS; SUBCUTANEOUS
Status: COMPLETED | OUTPATIENT
Start: 2020-07-24 | End: 2020-07-24

## 2020-07-24 RX ADMIN — AZACITIDINE 150 MG: 100 INJECTION, POWDER, LYOPHILIZED, FOR SOLUTION INTRAVENOUS; SUBCUTANEOUS at 10:07

## 2020-07-24 NOTE — NURSING
Patient received Vidaza injection SQ to ABD x 3.  Tolerated well.  Vitals stable.  No apparent distress noted.  Ambulated off unit with steady gait.

## 2020-07-27 ENCOUNTER — LAB VISIT (OUTPATIENT)
Dept: LAB | Facility: HOSPITAL | Age: 70
End: 2020-07-27
Attending: INTERNAL MEDICINE
Payer: MEDICARE

## 2020-07-27 ENCOUNTER — TELEPHONE (OUTPATIENT)
Dept: HEMATOLOGY/ONCOLOGY | Facility: CLINIC | Age: 70
End: 2020-07-27

## 2020-07-27 DIAGNOSIS — D46.9 MYELODYSPLASTIC SYNDROME: ICD-10-CM

## 2020-07-27 DIAGNOSIS — D46.C MDS (MYELODYSPLASTIC SYNDROME) WITH 5Q DELETION: ICD-10-CM

## 2020-07-27 LAB
ABO + RH BLD: NORMAL
ALBUMIN SERPL BCP-MCNC: 3.9 G/DL (ref 3.5–5.2)
ALP SERPL-CCNC: 62 U/L (ref 55–135)
ALT SERPL W/O P-5'-P-CCNC: 83 U/L (ref 10–44)
ANION GAP SERPL CALC-SCNC: 9 MMOL/L (ref 8–16)
AST SERPL-CCNC: 45 U/L (ref 10–40)
BASOPHILS # BLD AUTO: 0.02 K/UL (ref 0–0.2)
BASOPHILS NFR BLD: 0.7 % (ref 0–1.9)
BILIRUB SERPL-MCNC: 0.2 MG/DL (ref 0.1–1)
BLD GP AB SCN CELLS X3 SERPL QL: NORMAL
BUN SERPL-MCNC: 28 MG/DL (ref 8–23)
CALCIUM SERPL-MCNC: 9.3 MG/DL (ref 8.7–10.5)
CHLORIDE SERPL-SCNC: 101 MMOL/L (ref 95–110)
CO2 SERPL-SCNC: 28 MMOL/L (ref 23–29)
CREAT SERPL-MCNC: 1.3 MG/DL (ref 0.5–1.4)
DIFFERENTIAL METHOD: ABNORMAL
EOSINOPHIL # BLD AUTO: 0.1 K/UL (ref 0–0.5)
EOSINOPHIL NFR BLD: 4.3 % (ref 0–8)
ERYTHROCYTE [DISTWIDTH] IN BLOOD BY AUTOMATED COUNT: 18.1 % (ref 11.5–14.5)
EST. GFR  (AFRICAN AMERICAN): 48.4 ML/MIN/1.73 M^2
EST. GFR  (NON AFRICAN AMERICAN): 42 ML/MIN/1.73 M^2
GLUCOSE SERPL-MCNC: 122 MG/DL (ref 70–110)
HCT VFR BLD AUTO: 23.6 % (ref 37–48.5)
HGB BLD-MCNC: 7.6 G/DL (ref 12–16)
IMM GRANULOCYTES # BLD AUTO: 0.03 K/UL (ref 0–0.04)
IMM GRANULOCYTES NFR BLD AUTO: 1.1 % (ref 0–0.5)
LYMPHOCYTES # BLD AUTO: 1.1 K/UL (ref 1–4.8)
LYMPHOCYTES NFR BLD: 38.3 % (ref 18–48)
MCH RBC QN AUTO: 30.9 PG (ref 27–31)
MCHC RBC AUTO-ENTMCNC: 32.2 G/DL (ref 32–36)
MCV RBC AUTO: 96 FL (ref 82–98)
MONOCYTES # BLD AUTO: 0.1 K/UL (ref 0.3–1)
MONOCYTES NFR BLD: 3.2 % (ref 4–15)
NEUTROPHILS # BLD AUTO: 1.5 K/UL (ref 1.8–7.7)
NEUTROPHILS NFR BLD: 52.4 % (ref 38–73)
NRBC BLD-RTO: 0 /100 WBC
PLATELET # BLD AUTO: 167 K/UL (ref 150–350)
PMV BLD AUTO: 11.3 FL (ref 9.2–12.9)
POTASSIUM SERPL-SCNC: 3.9 MMOL/L (ref 3.5–5.1)
PROT SERPL-MCNC: 7.3 G/DL (ref 6–8.4)
RBC # BLD AUTO: 2.46 M/UL (ref 4–5.4)
SODIUM SERPL-SCNC: 138 MMOL/L (ref 136–145)
WBC # BLD AUTO: 2.82 K/UL (ref 3.9–12.7)

## 2020-07-27 PROCEDURE — 85025 COMPLETE CBC W/AUTO DIFF WBC: CPT | Mod: HCNC

## 2020-07-27 PROCEDURE — 36415 COLL VENOUS BLD VENIPUNCTURE: CPT | Mod: HCNC

## 2020-07-27 PROCEDURE — 80053 COMPREHEN METABOLIC PANEL: CPT | Mod: HCNC

## 2020-07-27 PROCEDURE — 86850 RBC ANTIBODY SCREEN: CPT | Mod: HCNC

## 2020-07-27 NOTE — TELEPHONE ENCOUNTER
"----- Message from Vanesa Garcias sent at 7/27/2020  3:36 PM CDT -----  Consult/Advisory:    Name Of Caller: Susan   Contact Preference?: [phone]    Provider Name: Dr Valdes    What is the nature of the call?:  Pt request a callback with results from today's lab work    Additional Notes:  "Thank you for all that you do for our patients'"           "

## 2020-08-03 ENCOUNTER — TELEPHONE (OUTPATIENT)
Dept: HEMATOLOGY/ONCOLOGY | Facility: CLINIC | Age: 70
End: 2020-08-03

## 2020-08-03 ENCOUNTER — LAB VISIT (OUTPATIENT)
Dept: LAB | Facility: HOSPITAL | Age: 70
End: 2020-08-03
Payer: MEDICARE

## 2020-08-03 DIAGNOSIS — D64.9 ANEMIA REQUIRING TRANSFUSIONS: ICD-10-CM

## 2020-08-03 DIAGNOSIS — D46.9 MYELODYSPLASTIC SYNDROME: Primary | ICD-10-CM

## 2020-08-03 DIAGNOSIS — D46.C MDS (MYELODYSPLASTIC SYNDROME) WITH 5Q DELETION: ICD-10-CM

## 2020-08-03 DIAGNOSIS — D46.9 MYELODYSPLASTIC SYNDROME: ICD-10-CM

## 2020-08-03 LAB
ABO + RH BLD: NORMAL
BASOPHILS # BLD AUTO: 0.02 K/UL (ref 0–0.2)
BASOPHILS NFR BLD: 0.8 % (ref 0–1.9)
BLD GP AB SCN CELLS X3 SERPL QL: NORMAL
DIFFERENTIAL METHOD: ABNORMAL
EOSINOPHIL # BLD AUTO: 0.1 K/UL (ref 0–0.5)
EOSINOPHIL NFR BLD: 3.5 % (ref 0–8)
ERYTHROCYTE [DISTWIDTH] IN BLOOD BY AUTOMATED COUNT: 17.8 % (ref 11.5–14.5)
HCT VFR BLD AUTO: 20.9 % (ref 37–48.5)
HGB BLD-MCNC: 6.6 G/DL (ref 12–16)
IMM GRANULOCYTES # BLD AUTO: 0.02 K/UL (ref 0–0.04)
IMM GRANULOCYTES NFR BLD AUTO: 0.8 % (ref 0–0.5)
LYMPHOCYTES # BLD AUTO: 1 K/UL (ref 1–4.8)
LYMPHOCYTES NFR BLD: 39.6 % (ref 18–48)
MCH RBC QN AUTO: 30.8 PG (ref 27–31)
MCHC RBC AUTO-ENTMCNC: 31.6 G/DL (ref 32–36)
MCV RBC AUTO: 98 FL (ref 82–98)
MONOCYTES # BLD AUTO: 0.1 K/UL (ref 0.3–1)
MONOCYTES NFR BLD: 2 % (ref 4–15)
NEUTROPHILS # BLD AUTO: 1.4 K/UL (ref 1.8–7.7)
NEUTROPHILS NFR BLD: 53.3 % (ref 38–73)
NRBC BLD-RTO: 0 /100 WBC
PLATELET # BLD AUTO: 88 K/UL (ref 150–350)
PMV BLD AUTO: 11.7 FL (ref 9.2–12.9)
RBC # BLD AUTO: 2.14 M/UL (ref 4–5.4)
WBC # BLD AUTO: 2.55 K/UL (ref 3.9–12.7)

## 2020-08-03 PROCEDURE — 86901 BLOOD TYPING SEROLOGIC RH(D): CPT | Mod: HCNC

## 2020-08-03 PROCEDURE — 36415 COLL VENOUS BLD VENIPUNCTURE: CPT | Mod: HCNC

## 2020-08-03 PROCEDURE — 85025 COMPLETE CBC W/AUTO DIFF WBC: CPT | Mod: HCNC

## 2020-08-03 RX ORDER — ACETAMINOPHEN 325 MG/1
650 TABLET ORAL
Status: CANCELLED | OUTPATIENT
Start: 2020-08-03

## 2020-08-03 RX ORDER — HYDROCODONE BITARTRATE AND ACETAMINOPHEN 500; 5 MG/1; MG/1
TABLET ORAL ONCE
Status: CANCELLED | OUTPATIENT
Start: 2020-08-03 | End: 2020-08-03

## 2020-08-03 RX ORDER — DIPHENHYDRAMINE HCL 25 MG
25 CAPSULE ORAL
Status: CANCELLED | OUTPATIENT
Start: 2020-08-03

## 2020-08-03 NOTE — TELEPHONE ENCOUNTER
----- Message from Shana Schwab sent at 8/3/2020  1:49 PM CDT -----  Pt called in to speak to nurse in regards to getting her results from blood test this morning.    Pt can be reached at [phone].    Thank you

## 2020-08-04 ENCOUNTER — INFUSION (OUTPATIENT)
Dept: INFUSION THERAPY | Facility: HOSPITAL | Age: 70
End: 2020-08-04
Attending: INTERNAL MEDICINE
Payer: MEDICARE

## 2020-08-04 VITALS
SYSTOLIC BLOOD PRESSURE: 165 MMHG | RESPIRATION RATE: 18 BRPM | DIASTOLIC BLOOD PRESSURE: 72 MMHG | TEMPERATURE: 98 F | HEART RATE: 63 BPM

## 2020-08-04 DIAGNOSIS — D64.9 ANEMIA REQUIRING TRANSFUSIONS: ICD-10-CM

## 2020-08-04 DIAGNOSIS — D46.9 MYELODYSPLASTIC SYNDROME: ICD-10-CM

## 2020-08-04 PROCEDURE — P9040 RBC LEUKOREDUCED IRRADIATED: HCPCS | Mod: HCNC

## 2020-08-04 PROCEDURE — 36430 TRANSFUSION BLD/BLD COMPNT: CPT | Mod: HCNC

## 2020-08-04 PROCEDURE — 86902 BLOOD TYPE ANTIGEN DONOR EA: CPT | Mod: HCNC,59

## 2020-08-04 PROCEDURE — 86922 COMPATIBILITY TEST ANTIGLOB: CPT | Mod: HCNC

## 2020-08-04 PROCEDURE — 25000003 PHARM REV CODE 250: Mod: HCNC | Performed by: INTERNAL MEDICINE

## 2020-08-04 RX ORDER — DIPHENHYDRAMINE HCL 25 MG
25 CAPSULE ORAL
Status: COMPLETED | OUTPATIENT
Start: 2020-08-04 | End: 2020-08-04

## 2020-08-04 RX ORDER — HYDROCODONE BITARTRATE AND ACETAMINOPHEN 500; 5 MG/1; MG/1
TABLET ORAL ONCE
Status: COMPLETED | OUTPATIENT
Start: 2020-08-04 | End: 2020-08-04

## 2020-08-04 RX ORDER — ACETAMINOPHEN 325 MG/1
650 TABLET ORAL
Status: COMPLETED | OUTPATIENT
Start: 2020-08-04 | End: 2020-08-04

## 2020-08-04 RX ADMIN — DIPHENHYDRAMINE HYDROCHLORIDE 25 MG: 25 CAPSULE ORAL at 09:08

## 2020-08-04 RX ADMIN — ACETAMINOPHEN 650 MG: 325 TABLET ORAL at 09:08

## 2020-08-04 RX ADMIN — SODIUM CHLORIDE: 9 INJECTION, SOLUTION INTRAVENOUS at 09:08

## 2020-08-04 NOTE — PLAN OF CARE
Problem: Adult Inpatient Plan of Care  Goal: Optimal Comfort and Wellbeing  Intervention: Provide Person-Centered Care  Flowsheets (Taken 8/4/2020 1004)  Trust Relationship/Rapport:   care explained   reassurance provided   thoughts/feelings acknowledged   emotional support provided   choices provided   empathic listening provided   questions answered   questions encouraged

## 2020-08-04 NOTE — PLAN OF CARE
Pt tolerated 1Unit PRBCs today. NAD. Port flushed + blood return present, flushed. Hep lock and deaccesed. AVS given to pt. Discharged home. Ambulated independently.

## 2020-08-06 ENCOUNTER — LAB VISIT (OUTPATIENT)
Dept: LAB | Facility: HOSPITAL | Age: 70
End: 2020-08-06
Payer: MEDICARE

## 2020-08-06 DIAGNOSIS — D46.C MDS (MYELODYSPLASTIC SYNDROME) WITH 5Q DELETION: ICD-10-CM

## 2020-08-06 DIAGNOSIS — D46.9 MYELODYSPLASTIC SYNDROME: ICD-10-CM

## 2020-08-06 LAB
ABO + RH BLD: NORMAL
ANISOCYTOSIS BLD QL SMEAR: SLIGHT
BASOPHILS # BLD AUTO: 0.01 K/UL (ref 0–0.2)
BASOPHILS NFR BLD: 0.5 % (ref 0–1.9)
BLD GP AB SCN CELLS X3 SERPL QL: NORMAL
DIFFERENTIAL METHOD: ABNORMAL
EOSINOPHIL # BLD AUTO: 0.1 K/UL (ref 0–0.5)
EOSINOPHIL NFR BLD: 5 % (ref 0–8)
ERYTHROCYTE [DISTWIDTH] IN BLOOD BY AUTOMATED COUNT: 17.2 % (ref 11.5–14.5)
HCT VFR BLD AUTO: 24.8 % (ref 37–48.5)
HGB BLD-MCNC: 8 G/DL (ref 12–16)
IMM GRANULOCYTES # BLD AUTO: 0.02 K/UL (ref 0–0.04)
IMM GRANULOCYTES NFR BLD AUTO: 0.9 % (ref 0–0.5)
LYMPHOCYTES # BLD AUTO: 1 K/UL (ref 1–4.8)
LYMPHOCYTES NFR BLD: 45.2 % (ref 18–48)
MCH RBC QN AUTO: 30.7 PG (ref 27–31)
MCHC RBC AUTO-ENTMCNC: 32.3 G/DL (ref 32–36)
MCV RBC AUTO: 95 FL (ref 82–98)
MONOCYTES # BLD AUTO: 0.1 K/UL (ref 0.3–1)
MONOCYTES NFR BLD: 5.4 % (ref 4–15)
NEUTROPHILS # BLD AUTO: 1 K/UL (ref 1.8–7.7)
NEUTROPHILS NFR BLD: 43 % (ref 38–73)
NRBC BLD-RTO: 0 /100 WBC
OVALOCYTES BLD QL SMEAR: ABNORMAL
PLATELET # BLD AUTO: 94 K/UL (ref 150–350)
PLATELET BLD QL SMEAR: ABNORMAL
PMV BLD AUTO: 11.7 FL (ref 9.2–12.9)
POLYCHROMASIA BLD QL SMEAR: ABNORMAL
RBC # BLD AUTO: 2.61 M/UL (ref 4–5.4)
WBC # BLD AUTO: 2.21 K/UL (ref 3.9–12.7)

## 2020-08-06 PROCEDURE — 86901 BLOOD TYPING SEROLOGIC RH(D): CPT | Mod: HCNC

## 2020-08-06 PROCEDURE — 85025 COMPLETE CBC W/AUTO DIFF WBC: CPT | Mod: HCNC

## 2020-08-06 PROCEDURE — 36415 COLL VENOUS BLD VENIPUNCTURE: CPT | Mod: HCNC

## 2020-08-10 ENCOUNTER — LAB VISIT (OUTPATIENT)
Dept: LAB | Facility: HOSPITAL | Age: 70
End: 2020-08-10
Payer: MEDICARE

## 2020-08-10 DIAGNOSIS — D46.C MDS (MYELODYSPLASTIC SYNDROME) WITH 5Q DELETION: ICD-10-CM

## 2020-08-10 DIAGNOSIS — D46.9 MYELODYSPLASTIC SYNDROME: ICD-10-CM

## 2020-08-10 LAB
ANISOCYTOSIS BLD QL SMEAR: SLIGHT
BASOPHILS # BLD AUTO: ABNORMAL K/UL (ref 0–0.2)
BASOPHILS NFR BLD: 3 % (ref 0–1.9)
DIFFERENTIAL METHOD: ABNORMAL
EOSINOPHIL # BLD AUTO: ABNORMAL K/UL (ref 0–0.5)
EOSINOPHIL NFR BLD: 8 % (ref 0–8)
ERYTHROCYTE [DISTWIDTH] IN BLOOD BY AUTOMATED COUNT: 17.7 % (ref 11.5–14.5)
HCT VFR BLD AUTO: 23 % (ref 37–48.5)
HGB BLD-MCNC: 7.7 G/DL (ref 12–16)
HYPOCHROMIA BLD QL SMEAR: ABNORMAL
IMM GRANULOCYTES # BLD AUTO: ABNORMAL K/UL (ref 0–0.04)
IMM GRANULOCYTES NFR BLD AUTO: ABNORMAL % (ref 0–0.5)
LYMPHOCYTES # BLD AUTO: ABNORMAL K/UL (ref 1–4.8)
LYMPHOCYTES NFR BLD: 47 % (ref 18–48)
MCH RBC QN AUTO: 31.7 PG (ref 27–31)
MCHC RBC AUTO-ENTMCNC: 33.5 G/DL (ref 32–36)
MCV RBC AUTO: 95 FL (ref 82–98)
MONOCYTES # BLD AUTO: ABNORMAL K/UL (ref 0.3–1)
MONOCYTES NFR BLD: 0 % (ref 4–15)
NEUTROPHILS NFR BLD: 42 % (ref 38–73)
NRBC BLD-RTO: 0 /100 WBC
OVALOCYTES BLD QL SMEAR: ABNORMAL
PLATELET # BLD AUTO: 192 K/UL (ref 150–350)
PMV BLD AUTO: 11.7 FL (ref 9.2–12.9)
POIKILOCYTOSIS BLD QL SMEAR: SLIGHT
POLYCHROMASIA BLD QL SMEAR: ABNORMAL
RBC # BLD AUTO: 2.43 M/UL (ref 4–5.4)
WBC # BLD AUTO: 1.9 K/UL (ref 3.9–12.7)

## 2020-08-10 PROCEDURE — 85027 COMPLETE CBC AUTOMATED: CPT | Mod: HCNC

## 2020-08-10 PROCEDURE — 85007 BL SMEAR W/DIFF WBC COUNT: CPT | Mod: HCNC

## 2020-08-17 ENCOUNTER — OFFICE VISIT (OUTPATIENT)
Dept: HEMATOLOGY/ONCOLOGY | Facility: CLINIC | Age: 70
End: 2020-08-17
Payer: MEDICARE

## 2020-08-17 ENCOUNTER — INFUSION (OUTPATIENT)
Dept: INFUSION THERAPY | Facility: HOSPITAL | Age: 70
End: 2020-08-17
Attending: INTERNAL MEDICINE
Payer: MEDICARE

## 2020-08-17 VITALS
SYSTOLIC BLOOD PRESSURE: 122 MMHG | RESPIRATION RATE: 20 BRPM | HEIGHT: 66 IN | DIASTOLIC BLOOD PRESSURE: 58 MMHG | BODY MASS INDEX: 28.17 KG/M2 | HEART RATE: 81 BPM | WEIGHT: 175.31 LBS | OXYGEN SATURATION: 96 %

## 2020-08-17 DIAGNOSIS — D46.9 MYELODYSPLASTIC SYNDROME: Primary | ICD-10-CM

## 2020-08-17 DIAGNOSIS — D64.9 ANEMIA REQUIRING TRANSFUSIONS: ICD-10-CM

## 2020-08-17 DIAGNOSIS — E87.6 HYPOKALEMIA: ICD-10-CM

## 2020-08-17 DIAGNOSIS — D46.C MDS (MYELODYSPLASTIC SYNDROME) WITH 5Q DELETION: Primary | ICD-10-CM

## 2020-08-17 PROCEDURE — 1125F PR PAIN SEVERITY QUANTIFIED, PAIN PRESENT: ICD-10-PCS | Mod: HCNC,S$GLB,, | Performed by: INTERNAL MEDICINE

## 2020-08-17 PROCEDURE — 3078F DIAST BP <80 MM HG: CPT | Mod: HCNC,CPTII,S$GLB, | Performed by: INTERNAL MEDICINE

## 2020-08-17 PROCEDURE — 99215 OFFICE O/P EST HI 40 MIN: CPT | Mod: HCNC,S$GLB,, | Performed by: INTERNAL MEDICINE

## 2020-08-17 PROCEDURE — 63600175 PHARM REV CODE 636 W HCPCS: Mod: JG,HCNC | Performed by: INTERNAL MEDICINE

## 2020-08-17 PROCEDURE — 1159F PR MEDICATION LIST DOCUMENTED IN MEDICAL RECORD: ICD-10-PCS | Mod: HCNC,S$GLB,, | Performed by: INTERNAL MEDICINE

## 2020-08-17 PROCEDURE — 3074F SYST BP LT 130 MM HG: CPT | Mod: HCNC,CPTII,S$GLB, | Performed by: INTERNAL MEDICINE

## 2020-08-17 PROCEDURE — 96401 CHEMO ANTI-NEOPL SQ/IM: CPT | Mod: HCNC

## 2020-08-17 PROCEDURE — 3078F PR MOST RECENT DIASTOLIC BLOOD PRESSURE < 80 MM HG: ICD-10-PCS | Mod: HCNC,CPTII,S$GLB, | Performed by: INTERNAL MEDICINE

## 2020-08-17 PROCEDURE — 99215 PR OFFICE/OUTPT VISIT, EST, LEVL V, 40-54 MIN: ICD-10-PCS | Mod: HCNC,S$GLB,, | Performed by: INTERNAL MEDICINE

## 2020-08-17 PROCEDURE — 99999 PR PBB SHADOW E&M-EST. PATIENT-LVL V: ICD-10-PCS | Mod: PBBFAC,HCNC,, | Performed by: INTERNAL MEDICINE

## 2020-08-17 PROCEDURE — 1159F MED LIST DOCD IN RCRD: CPT | Mod: HCNC,S$GLB,, | Performed by: INTERNAL MEDICINE

## 2020-08-17 PROCEDURE — 1101F PR PT FALLS ASSESS DOC 0-1 FALLS W/OUT INJ PAST YR: ICD-10-PCS | Mod: HCNC,CPTII,S$GLB, | Performed by: INTERNAL MEDICINE

## 2020-08-17 PROCEDURE — 3008F BODY MASS INDEX DOCD: CPT | Mod: HCNC,CPTII,S$GLB, | Performed by: INTERNAL MEDICINE

## 2020-08-17 PROCEDURE — 1101F PT FALLS ASSESS-DOCD LE1/YR: CPT | Mod: HCNC,CPTII,S$GLB, | Performed by: INTERNAL MEDICINE

## 2020-08-17 PROCEDURE — 3008F PR BODY MASS INDEX (BMI) DOCUMENTED: ICD-10-PCS | Mod: HCNC,CPTII,S$GLB, | Performed by: INTERNAL MEDICINE

## 2020-08-17 PROCEDURE — 99999 PR PBB SHADOW E&M-EST. PATIENT-LVL V: CPT | Mod: PBBFAC,HCNC,, | Performed by: INTERNAL MEDICINE

## 2020-08-17 PROCEDURE — 1125F AMNT PAIN NOTED PAIN PRSNT: CPT | Mod: HCNC,S$GLB,, | Performed by: INTERNAL MEDICINE

## 2020-08-17 PROCEDURE — 3074F PR MOST RECENT SYSTOLIC BLOOD PRESSURE < 130 MM HG: ICD-10-PCS | Mod: HCNC,CPTII,S$GLB, | Performed by: INTERNAL MEDICINE

## 2020-08-17 RX ORDER — DIPHENHYDRAMINE HCL 25 MG
25 CAPSULE ORAL
Status: CANCELLED | OUTPATIENT
Start: 2020-08-17

## 2020-08-17 RX ORDER — AZACITIDINE 100 MG/1
75 INJECTION, POWDER, LYOPHILIZED, FOR SOLUTION INTRAVENOUS; SUBCUTANEOUS
Status: CANCELLED | OUTPATIENT
Start: 2020-08-19

## 2020-08-17 RX ORDER — AZACITIDINE 100 MG/1
75 INJECTION, POWDER, LYOPHILIZED, FOR SOLUTION INTRAVENOUS; SUBCUTANEOUS
Status: CANCELLED | OUTPATIENT
Start: 2020-08-20

## 2020-08-17 RX ORDER — ACETAMINOPHEN 325 MG/1
650 TABLET ORAL
Status: CANCELLED | OUTPATIENT
Start: 2020-08-17

## 2020-08-17 RX ORDER — AZACITIDINE 100 MG/1
75 INJECTION, POWDER, LYOPHILIZED, FOR SOLUTION INTRAVENOUS; SUBCUTANEOUS
Status: CANCELLED | OUTPATIENT
Start: 2020-08-21

## 2020-08-17 RX ORDER — ONDANSETRON 4 MG/1
8 TABLET, ORALLY DISINTEGRATING ORAL ONCE
Status: CANCELLED | OUTPATIENT
Start: 2020-08-21

## 2020-08-17 RX ORDER — ONDANSETRON 4 MG/1
8 TABLET, ORALLY DISINTEGRATING ORAL ONCE
Status: DISCONTINUED | OUTPATIENT
Start: 2020-08-17 | End: 2020-08-17 | Stop reason: HOSPADM

## 2020-08-17 RX ORDER — ONDANSETRON 4 MG/1
8 TABLET, ORALLY DISINTEGRATING ORAL ONCE
Status: CANCELLED | OUTPATIENT
Start: 2020-08-20

## 2020-08-17 RX ORDER — ONDANSETRON 4 MG/1
8 TABLET, ORALLY DISINTEGRATING ORAL ONCE
Status: CANCELLED | OUTPATIENT
Start: 2020-08-18

## 2020-08-17 RX ORDER — ONDANSETRON 4 MG/1
8 TABLET, ORALLY DISINTEGRATING ORAL ONCE
Status: CANCELLED | OUTPATIENT
Start: 2020-08-17

## 2020-08-17 RX ORDER — AZACITIDINE 100 MG/1
75 INJECTION, POWDER, LYOPHILIZED, FOR SOLUTION INTRAVENOUS; SUBCUTANEOUS
Status: COMPLETED | OUTPATIENT
Start: 2020-08-17 | End: 2020-08-17

## 2020-08-17 RX ORDER — AZACITIDINE 100 MG/1
75 INJECTION, POWDER, LYOPHILIZED, FOR SOLUTION INTRAVENOUS; SUBCUTANEOUS
Status: CANCELLED | OUTPATIENT
Start: 2020-08-17

## 2020-08-17 RX ORDER — ONDANSETRON 4 MG/1
8 TABLET, ORALLY DISINTEGRATING ORAL ONCE
Status: CANCELLED | OUTPATIENT
Start: 2020-08-19

## 2020-08-17 RX ORDER — HYDROCODONE BITARTRATE AND ACETAMINOPHEN 500; 5 MG/1; MG/1
TABLET ORAL ONCE
Status: CANCELLED | OUTPATIENT
Start: 2020-08-17 | End: 2020-08-17

## 2020-08-17 RX ORDER — AZACITIDINE 100 MG/1
75 INJECTION, POWDER, LYOPHILIZED, FOR SOLUTION INTRAVENOUS; SUBCUTANEOUS
Status: CANCELLED | OUTPATIENT
Start: 2020-08-18

## 2020-08-17 RX ADMIN — AZACITIDINE 150 MG: 100 INJECTION, POWDER, LYOPHILIZED, FOR SOLUTION INTRAVENOUS; SUBCUTANEOUS at 12:08

## 2020-08-17 NOTE — PROGRESS NOTES
Subjective:       Patient ID: Susan Puente is a 70 y.o. female.    Chief Complaint: Myelodysplastic syndrome and Shortness of Breath    HPI: Patient presents today alone for follow up of her history of MDS 5 q del syndrome prior to cycle 17  Vidaza as third line therapy for 5q minus syndrome. She continued to have disease per recent BM bx 10/2019. Continues with chronic fatigue and leg pain. Leg pain has improved with gabapentin which she currently takes twice daily. She also has bilateral upper extremity pain, primarily at right shoulder.   Notes she is finally having hair loss; using goal milk bar soap to avoid hair and scalp irritation. She denies fevers, chills, sob, chest pain, n/v/c. Exercise has decreased due to current COVID19 quarantine; but she has changed her diet resulting in healthy weight loss. We discussed options to increase physical acitivity,    Oncology History   Ms. Puente is a 68 year old female with hx of DM2, peripheral vascular disease, tobacco use, CAD, hyperlipidemia, hypertension who was hospitalized 11/10/16 for anemia. hgb 5.3  MCH 43.4 with normal WBC and platelets. Patient had normal iron stores. She was transfused 3 units of PRBCs and discharged home with hgb 8.7 on 11/11/16. She was referred for further evalutation of her anemia. On 12/16/16 patient had a normal SPEP and immunofixation. Slightly elevated kappa light chains with normal ration. Elevated vitamin b12, normal folate, JAYDEN was positive with a low titer and negative profile. Patient had a bone marrow biopsy 1/5/16 which showed the core biopsy is normocellular for age (40%); however, megakaryocytes are increased and show frequent small, hypolobated forms. Additionally, a subset of the neutrophils are hypogranular. Blasts are not increased by either morphology (1.2%) or in the corresponding flow cytometric analysis. Fish detects a 5q deletion in 54.5% of nuclei. Cytogenetics reported 20 metaphases, 2  metaphases were normal and 18 metaphases had a 5q deletion. No additional cytogenetic abnormalities were detected. Findings consistent with 5q deletion syndrome.     Patient had a delay in obtaining Revlimid 10 mg daily but did start taking the medication 2/8/17. On 2/9/17 patient developed a diffuse maculopapular rash throughout scalp arms, legs and torso. She states she had no stridor or wheezing. She discontinued medication 2/15/17. Went to allergist who provided references on desensitization so that patient could resume Revlimid. Unfortunately developed pancytopenia and Revlimid stopped 6/16/17. She had a repeat BMBX  performed 6/8/17 and showed a hypercellular marrow (60%) continued atypia in the granulocytes and megakaryocytes were noted.  Additionally, there is erythroid atypia present. No increase in blasts. Cytogenetics are normal and MDS FISH is negative, failing to show 5 q minus. NGS should no significant molecular mutations.  Anemia work up revealed bienvenido negative hemolysis.    Revlimid has been stopped since June 2017 after she developed pancytopenia while on Revlimid (required desensitization). CBC was normal for almost 1 year and marrow was negative for del5q. Bone marrow biopsy repeat from 6/2018 showed relapsed 5q minus MDS without new or additional mutations. Revlimid restarted at 2.5 mg daily with prednisone to prevent allergic reaction. Titrated to a goal of 10mg daily Revlimid. Hospital admission 3/12-3/17/19 for unresponsive event after blood transfusion, found to be profoundly pancytopenic at admission. Since hospital admission Revlimid has been stopped. Repeat marrow March 2019 shows persistent MDS with 5q minus.     Started cycle 1 Vidaza 5/6/19 subq for 5 days every 28 days. Restaging bone marrow biopsy from 10/24/19 shows persistent MDS, no excess blasts and 3 of 20 metaphases with 5q deletion.    Review of Systems   Constitutional: Negative for fever, chills, weight loss, fatigue.    HENT: Negative for sinus congestion or rhinorrhea. Negative for ear pain, mouth sores, nosebleeds and trouble swallowing.    Respiratory: Negative for cough, shortness of breath and wheezing.    Cardiovascular: Negative for chest pain and leg swelling.   Gastrointestinal: Negative for abdominal distention, abdominal pain, blood in stool, nausea and vomiting.   Endocrine: Negative for polyphagia and polyuria.   Genitourinary: Negative for dysuria, hematuria and urgency.   Musculoskeletal: Positive for sciatic pain much improved with gabapentin. Right shoulder pain and limited ROM.   Skin: Negative for color change, pallor and rash.   Neurological: Negative for dizziness, weakness, light-headedness and headaches.   Hematological: Negative for adenopathy. Does not bruise/bleed easily.   Psychiatric/Behavioral: Negative for agitation and behavioral problems.       Objective:    No exam due to telehealth visit; COVID19  Physical Exam   Constitutional: She is oriented to person, place, and time. She appears well-developed and well-nourished.   Mouth/Throat: Oropharynx is clear and moist. No oropharyngeal exudate.   Eyes: Conjunctivae and EOM are normal. Right eye exhibits no discharge. Left eye exhibits no discharge.   Neck: Normal range of motion. Neck supple.   Cardiovascular: Normal rate, regular rhythm, normal heart sounds and intact distal pulses.   No murmur heard.  Pulmonary/Chest: Effort normal and breath sounds normal. No respiratory distress. She has no wheezes.   Abdominal: Soft. Bowel sounds are normal. She exhibits no distension and no mass. There is no tenderness.   Musculoskeletal: Normal range of motion. She exhibits no edema.   Neurological: She is alert and oriented to person, place, and time.   Skin: Skin is warm and dry. No rash noted.   Psychiatric: She has a normal mood and affect. Her behavior is normal. Judgment and thought content normal.   Nursing note and vitals reviewed.      Assessment:        1. Myelodysplastic syndrome    2. Anemia requiring transfusions        Plan:     MDS  - Initially with 5 q minus syndrome and treated with Revlimid. Developed allergy and was then desensitized. Soon after developed pancytopenia and Revlimid held since June 2017.    - BMBX on 6/8/17 was with normal cytogenetics. 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 Discussed treatment options of HMA therapy or repeat trial of Revlimid and patient wished to proceed with Revlimid   - Revlimid stopped again due to repeat allergic reaction and pancytopenia 3/2019  - Started cycle 1 Vidaza 5/6/19 subq for 5 days every 28 days  - Restaging bone marrow biopsy following cycle 5 from 10/24/19 shows persistent MDS, no excess blasts and 3 of 20 metaphases with 5q deletion  - Tolerated cycles 1-16 well thus far. Package insert for Vidaza recommends holding if ANC <1000, however pt has been receiving this with an ANC below 1000 for each cycle. Okay to continue to give despite neutropenia. Will proceed with C17 today  -Desired repeat bone marrow biopsy Spring 2020, unable to perform with sedation due to COVID19  - CBC is stable; platelets actually normalized. Continue to treat with Vidaza    Cytopenias 2/2 disease: anemia and leukopenia  - Transfuse for hgb < 7 or plts < 10K  - Continue ppx cipro, acyclovir, and fluconazole  - Transfuse 8/17/2020 for hemoglobin of 7    DM2  - management per PCP  - continue metformin    HTN  - management per PCP  - continue lisinopril-HCTZ    CAD  - continue praluent for HLD per PCP (intolerant to statins)  - continue ASA    Adjustment disorder  - Improved mood on Celexa. Continue    Myalgias/possible sciatica  - started trial of gabapentin 100 mg bid 11/4/19, much improved; now only taking gabapentin once a day in AM  - patient to return to [facility]     Insonmia  - using melatonin OTC    Follow up:  - Please schedule CBC and type and screen weekly on Mondays for possible transfusions  through months of August and September  - Schedule return visit with CBC, type and screen, CMP and appt with Dr. Valdes or NP 9/14  - Schedule chemo chair for cycle 18 vidaza 9/14-9/18

## 2020-08-17 NOTE — Clinical Note
- Please schedule CBC and type and screen weekly on Mondays for possible transfusions through months of August and September  - Schedule return visit with CBC, type and screen, CMP and appt with Dr. Valdes or NP 9/14  - Schedule chemo chair for cycle 18 vidaza 9/14-9/18

## 2020-08-18 ENCOUNTER — INFUSION (OUTPATIENT)
Dept: INFUSION THERAPY | Facility: HOSPITAL | Age: 70
End: 2020-08-18
Attending: INTERNAL MEDICINE
Payer: MEDICARE

## 2020-08-18 VITALS
TEMPERATURE: 98 F | RESPIRATION RATE: 20 BRPM | SYSTOLIC BLOOD PRESSURE: 164 MMHG | HEART RATE: 71 BPM | DIASTOLIC BLOOD PRESSURE: 72 MMHG

## 2020-08-18 DIAGNOSIS — D46.9 MYELODYSPLASTIC SYNDROME: ICD-10-CM

## 2020-08-18 DIAGNOSIS — D46.C MDS (MYELODYSPLASTIC SYNDROME) WITH 5Q DELETION: Primary | ICD-10-CM

## 2020-08-18 DIAGNOSIS — D64.9 ANEMIA REQUIRING TRANSFUSIONS: ICD-10-CM

## 2020-08-18 PROCEDURE — P9038 RBC IRRADIATED: HCPCS | Mod: HCNC

## 2020-08-18 PROCEDURE — 63600175 PHARM REV CODE 636 W HCPCS: Mod: JG,HCNC | Performed by: INTERNAL MEDICINE

## 2020-08-18 PROCEDURE — 86922 COMPATIBILITY TEST ANTIGLOB: CPT | Mod: HCNC

## 2020-08-18 PROCEDURE — 25000003 PHARM REV CODE 250: Mod: HCNC

## 2020-08-18 PROCEDURE — 25000003 PHARM REV CODE 250: Mod: HCNC | Performed by: INTERNAL MEDICINE

## 2020-08-18 PROCEDURE — 96401 CHEMO ANTI-NEOPL SQ/IM: CPT | Mod: HCNC

## 2020-08-18 PROCEDURE — 27201040 HC RC 50 FILTER: Mod: HCNC

## 2020-08-18 PROCEDURE — 36430 TRANSFUSION BLD/BLD COMPNT: CPT | Mod: HCNC

## 2020-08-18 RX ORDER — DIPHENHYDRAMINE HCL 25 MG
25 CAPSULE ORAL
Status: COMPLETED | OUTPATIENT
Start: 2020-08-18 | End: 2020-08-18

## 2020-08-18 RX ORDER — AZACITIDINE 100 MG/1
75 INJECTION, POWDER, LYOPHILIZED, FOR SOLUTION INTRAVENOUS; SUBCUTANEOUS
Status: COMPLETED | OUTPATIENT
Start: 2020-08-18 | End: 2020-08-18

## 2020-08-18 RX ORDER — HYDROCODONE BITARTRATE AND ACETAMINOPHEN 500; 5 MG/1; MG/1
TABLET ORAL ONCE
Status: COMPLETED | OUTPATIENT
Start: 2020-08-18 | End: 2020-08-18

## 2020-08-18 RX ORDER — ACETAMINOPHEN 325 MG/1
TABLET ORAL
Status: COMPLETED
Start: 2020-08-18 | End: 2020-08-18

## 2020-08-18 RX ORDER — DIPHENHYDRAMINE HCL 25 MG
CAPSULE ORAL
Status: DISCONTINUED
Start: 2020-08-18 | End: 2020-08-18 | Stop reason: HOSPADM

## 2020-08-18 RX ORDER — ACETAMINOPHEN 325 MG/1
650 TABLET ORAL
Status: DISCONTINUED | OUTPATIENT
Start: 2020-08-18 | End: 2020-08-18 | Stop reason: HOSPADM

## 2020-08-18 RX ADMIN — ACETAMINOPHEN 650 MG: 325 TABLET ORAL at 09:08

## 2020-08-18 RX ADMIN — DIPHENHYDRAMINE HYDROCHLORIDE 25 MG: 25 CAPSULE ORAL at 09:08

## 2020-08-18 RX ADMIN — SODIUM CHLORIDE: 9 INJECTION, SOLUTION INTRAVENOUS at 09:08

## 2020-08-18 RX ADMIN — AZACITIDINE 150 MG: 100 INJECTION, POWDER, LYOPHILIZED, FOR SOLUTION INTRAVENOUS; SUBCUTANEOUS at 10:08

## 2020-08-18 NOTE — PLAN OF CARE
1u PRBC tolerated well, no adverse reactions, vitals stable. Avs given pt scheduled to rtn on 8/19/20 for inj. D/c to home independently, reminded to drink 8-10 glasses of h2o daily unless advised by provider otherwise.

## 2020-08-19 ENCOUNTER — INFUSION (OUTPATIENT)
Dept: INFUSION THERAPY | Facility: HOSPITAL | Age: 70
End: 2020-08-19
Attending: INTERNAL MEDICINE
Payer: MEDICARE

## 2020-08-19 VITALS — DIASTOLIC BLOOD PRESSURE: 59 MMHG | SYSTOLIC BLOOD PRESSURE: 127 MMHG | TEMPERATURE: 99 F | HEART RATE: 78 BPM

## 2020-08-19 DIAGNOSIS — D46.C MDS (MYELODYSPLASTIC SYNDROME) WITH 5Q DELETION: Primary | ICD-10-CM

## 2020-08-19 DIAGNOSIS — D46.9 MYELODYSPLASTIC SYNDROME: Primary | ICD-10-CM

## 2020-08-19 PROCEDURE — 63600175 PHARM REV CODE 636 W HCPCS: Mod: JG,HCNC | Performed by: INTERNAL MEDICINE

## 2020-08-19 PROCEDURE — 96401 CHEMO ANTI-NEOPL SQ/IM: CPT | Mod: HCNC

## 2020-08-19 RX ORDER — AZACITIDINE 100 MG/1
75 INJECTION, POWDER, LYOPHILIZED, FOR SOLUTION INTRAVENOUS; SUBCUTANEOUS
Status: COMPLETED | OUTPATIENT
Start: 2020-08-19 | End: 2020-08-19

## 2020-08-19 RX ADMIN — AZACITIDINE 150 MG: 100 INJECTION, POWDER, LYOPHILIZED, FOR SOLUTION INTRAVENOUS; SUBCUTANEOUS at 11:08

## 2020-08-20 ENCOUNTER — INFUSION (OUTPATIENT)
Dept: INFUSION THERAPY | Facility: HOSPITAL | Age: 70
End: 2020-08-20
Attending: INTERNAL MEDICINE
Payer: MEDICARE

## 2020-08-20 VITALS — RESPIRATION RATE: 18 BRPM | HEART RATE: 83 BPM | DIASTOLIC BLOOD PRESSURE: 75 MMHG | SYSTOLIC BLOOD PRESSURE: 159 MMHG

## 2020-08-20 DIAGNOSIS — D46.C MDS (MYELODYSPLASTIC SYNDROME) WITH 5Q DELETION: Primary | ICD-10-CM

## 2020-08-20 DIAGNOSIS — E11.9 TYPE 2 DIABETES MELLITUS WITHOUT COMPLICATION: ICD-10-CM

## 2020-08-20 PROCEDURE — 63600175 PHARM REV CODE 636 W HCPCS: Mod: JG,HCNC | Performed by: INTERNAL MEDICINE

## 2020-08-20 PROCEDURE — 96401 CHEMO ANTI-NEOPL SQ/IM: CPT | Mod: HCNC

## 2020-08-20 RX ORDER — AZACITIDINE 100 MG/1
75 INJECTION, POWDER, LYOPHILIZED, FOR SOLUTION INTRAVENOUS; SUBCUTANEOUS
Status: COMPLETED | OUTPATIENT
Start: 2020-08-20 | End: 2020-08-20

## 2020-08-20 RX ADMIN — AZACITIDINE 150 MG: 100 INJECTION, POWDER, LYOPHILIZED, FOR SOLUTION INTRAVENOUS; SUBCUTANEOUS at 10:08

## 2020-08-21 ENCOUNTER — INFUSION (OUTPATIENT)
Dept: INFUSION THERAPY | Facility: HOSPITAL | Age: 70
End: 2020-08-21
Attending: INTERNAL MEDICINE
Payer: MEDICARE

## 2020-08-21 VITALS
RESPIRATION RATE: 20 BRPM | SYSTOLIC BLOOD PRESSURE: 134 MMHG | HEART RATE: 79 BPM | DIASTOLIC BLOOD PRESSURE: 60 MMHG | TEMPERATURE: 98 F

## 2020-08-21 DIAGNOSIS — E87.6 HYPOKALEMIA: ICD-10-CM

## 2020-08-21 DIAGNOSIS — D46.C MDS (MYELODYSPLASTIC SYNDROME) WITH 5Q DELETION: Primary | ICD-10-CM

## 2020-08-21 PROCEDURE — 96401 CHEMO ANTI-NEOPL SQ/IM: CPT | Mod: HCNC

## 2020-08-21 PROCEDURE — 63600175 PHARM REV CODE 636 W HCPCS: Mod: JW,JG,HCNC | Performed by: INTERNAL MEDICINE

## 2020-08-21 RX ORDER — ONDANSETRON 4 MG/1
8 TABLET, ORALLY DISINTEGRATING ORAL ONCE
Status: DISCONTINUED | OUTPATIENT
Start: 2020-08-21 | End: 2020-08-21 | Stop reason: HOSPADM

## 2020-08-21 RX ORDER — AZACITIDINE 100 MG/1
75 INJECTION, POWDER, LYOPHILIZED, FOR SOLUTION INTRAVENOUS; SUBCUTANEOUS
Status: COMPLETED | OUTPATIENT
Start: 2020-08-21 | End: 2020-08-21

## 2020-08-21 RX ADMIN — AZACITIDINE 150 MG: 100 INJECTION, POWDER, LYOPHILIZED, FOR SOLUTION INTRAVENOUS; SUBCUTANEOUS at 12:08

## 2020-08-24 ENCOUNTER — LAB VISIT (OUTPATIENT)
Dept: LAB | Facility: HOSPITAL | Age: 70
End: 2020-08-24
Payer: MEDICARE

## 2020-08-24 DIAGNOSIS — D46.9 MYELODYSPLASTIC SYNDROME: ICD-10-CM

## 2020-08-24 DIAGNOSIS — D46.C MDS (MYELODYSPLASTIC SYNDROME) WITH 5Q DELETION: ICD-10-CM

## 2020-08-24 LAB
ABO + RH BLD: NORMAL
BASOPHILS # BLD AUTO: 0.02 K/UL (ref 0–0.2)
BASOPHILS NFR BLD: 0.6 % (ref 0–1.9)
BLD GP AB SCN CELLS X3 SERPL QL: NORMAL
DIFFERENTIAL METHOD: ABNORMAL
EOSINOPHIL # BLD AUTO: 0.2 K/UL (ref 0–0.5)
EOSINOPHIL NFR BLD: 4.6 % (ref 0–8)
ERYTHROCYTE [DISTWIDTH] IN BLOOD BY AUTOMATED COUNT: 18.6 % (ref 11.5–14.5)
HCT VFR BLD AUTO: 23.8 % (ref 37–48.5)
HGB BLD-MCNC: 7.6 G/DL (ref 12–16)
IMM GRANULOCYTES # BLD AUTO: 0.04 K/UL (ref 0–0.04)
IMM GRANULOCYTES NFR BLD AUTO: 1.2 % (ref 0–0.5)
LYMPHOCYTES # BLD AUTO: 1.3 K/UL (ref 1–4.8)
LYMPHOCYTES NFR BLD: 39 % (ref 18–48)
MCH RBC QN AUTO: 31.4 PG (ref 27–31)
MCHC RBC AUTO-ENTMCNC: 31.9 G/DL (ref 32–36)
MCV RBC AUTO: 98 FL (ref 82–98)
MONOCYTES # BLD AUTO: 0.1 K/UL (ref 0.3–1)
MONOCYTES NFR BLD: 3.4 % (ref 4–15)
NEUTROPHILS # BLD AUTO: 1.7 K/UL (ref 1.8–7.7)
NEUTROPHILS NFR BLD: 51.2 % (ref 38–73)
NRBC BLD-RTO: 0 /100 WBC
PLATELET # BLD AUTO: 228 K/UL (ref 150–350)
PMV BLD AUTO: 11.7 FL (ref 9.2–12.9)
RBC # BLD AUTO: 2.42 M/UL (ref 4–5.4)
WBC # BLD AUTO: 3.26 K/UL (ref 3.9–12.7)

## 2020-08-24 PROCEDURE — 86850 RBC ANTIBODY SCREEN: CPT | Mod: HCNC

## 2020-08-24 PROCEDURE — 85025 COMPLETE CBC W/AUTO DIFF WBC: CPT | Mod: HCNC

## 2020-08-24 PROCEDURE — 36415 COLL VENOUS BLD VENIPUNCTURE: CPT | Mod: HCNC

## 2020-08-28 ENCOUNTER — TELEPHONE (OUTPATIENT)
Dept: HEMATOLOGY/ONCOLOGY | Facility: CLINIC | Age: 70
End: 2020-08-28

## 2020-08-28 NOTE — TELEPHONE ENCOUNTER
Left voicemail requesting patient return call     Graft Donor Site Bandage (Optional-Leave Blank If You Don't Want In Note): Steri-strips and a pressure bandage were applied to the donor site.

## 2020-08-28 NOTE — TELEPHONE ENCOUNTER
"----- Message from Vanesa Garcias sent at 8/28/2020  1:58 PM CDT -----  Returning a Missed Call    Contact Preference: [phone]  Patient returning phone call to: Jagruti Morrissey    Provider: Dr Valdes   Does patient feel the need to be seen today? No    Additional Notes:  "Thank you for all that you do for our patients'"       "

## 2020-08-31 ENCOUNTER — LAB VISIT (OUTPATIENT)
Dept: LAB | Facility: HOSPITAL | Age: 70
End: 2020-08-31
Payer: MEDICARE

## 2020-08-31 DIAGNOSIS — D46.9 MYELODYSPLASTIC SYNDROME: ICD-10-CM

## 2020-08-31 DIAGNOSIS — D46.C MDS (MYELODYSPLASTIC SYNDROME) WITH 5Q DELETION: ICD-10-CM

## 2020-08-31 DIAGNOSIS — D46.9 MYELODYSPLASTIC SYNDROME: Primary | ICD-10-CM

## 2020-08-31 LAB
ABO + RH BLD: NORMAL
ALBUMIN SERPL BCP-MCNC: 3.8 G/DL (ref 3.5–5.2)
ALP SERPL-CCNC: 67 U/L (ref 55–135)
ALT SERPL W/O P-5'-P-CCNC: 103 U/L (ref 10–44)
ANION GAP SERPL CALC-SCNC: 9 MMOL/L (ref 8–16)
AST SERPL-CCNC: 42 U/L (ref 10–40)
BASOPHILS # BLD AUTO: 0.01 K/UL (ref 0–0.2)
BASOPHILS NFR BLD: 0.3 % (ref 0–1.9)
BILIRUB SERPL-MCNC: 0.3 MG/DL (ref 0.1–1)
BLD GP AB SCN CELLS X3 SERPL QL: NORMAL
BUN SERPL-MCNC: 25 MG/DL (ref 8–23)
CALCIUM SERPL-MCNC: 9.3 MG/DL (ref 8.7–10.5)
CHLORIDE SERPL-SCNC: 105 MMOL/L (ref 95–110)
CO2 SERPL-SCNC: 24 MMOL/L (ref 23–29)
CREAT SERPL-MCNC: 1 MG/DL (ref 0.5–1.4)
DIFFERENTIAL METHOD: ABNORMAL
EOSINOPHIL # BLD AUTO: 0.1 K/UL (ref 0–0.5)
EOSINOPHIL NFR BLD: 3.7 % (ref 0–8)
ERYTHROCYTE [DISTWIDTH] IN BLOOD BY AUTOMATED COUNT: 19.4 % (ref 11.5–14.5)
EST. GFR  (AFRICAN AMERICAN): >60 ML/MIN/1.73 M^2
EST. GFR  (NON AFRICAN AMERICAN): 57.2 ML/MIN/1.73 M^2
GLUCOSE SERPL-MCNC: 116 MG/DL (ref 70–110)
HCT VFR BLD AUTO: 19.6 % (ref 37–48.5)
HGB BLD-MCNC: 6 G/DL (ref 12–16)
IMM GRANULOCYTES # BLD AUTO: 0.02 K/UL (ref 0–0.04)
IMM GRANULOCYTES NFR BLD AUTO: 0.7 % (ref 0–0.5)
LYMPHOCYTES # BLD AUTO: 1.3 K/UL (ref 1–4.8)
LYMPHOCYTES NFR BLD: 44.6 % (ref 18–48)
MCH RBC QN AUTO: 30.9 PG (ref 27–31)
MCHC RBC AUTO-ENTMCNC: 30.6 G/DL (ref 32–36)
MCV RBC AUTO: 101 FL (ref 82–98)
MONOCYTES # BLD AUTO: 0.1 K/UL (ref 0.3–1)
MONOCYTES NFR BLD: 2 % (ref 4–15)
NEUTROPHILS # BLD AUTO: 1.4 K/UL (ref 1.8–7.7)
NEUTROPHILS NFR BLD: 48.7 % (ref 38–73)
NRBC BLD-RTO: 0 /100 WBC
PLATELET # BLD AUTO: 111 K/UL (ref 150–350)
PMV BLD AUTO: 11.4 FL (ref 9.2–12.9)
POTASSIUM SERPL-SCNC: 4.4 MMOL/L (ref 3.5–5.1)
PROT SERPL-MCNC: 7 G/DL (ref 6–8.4)
RBC # BLD AUTO: 1.94 M/UL (ref 4–5.4)
SODIUM SERPL-SCNC: 138 MMOL/L (ref 136–145)
WBC # BLD AUTO: 2.96 K/UL (ref 3.9–12.7)

## 2020-08-31 PROCEDURE — 85025 COMPLETE CBC W/AUTO DIFF WBC: CPT | Mod: HCNC

## 2020-08-31 PROCEDURE — 36415 COLL VENOUS BLD VENIPUNCTURE: CPT | Mod: HCNC

## 2020-08-31 PROCEDURE — 80053 COMPREHEN METABOLIC PANEL: CPT | Mod: HCNC

## 2020-08-31 PROCEDURE — 86901 BLOOD TYPING SEROLOGIC RH(D): CPT | Mod: HCNC

## 2020-08-31 RX ORDER — HYDROCODONE BITARTRATE AND ACETAMINOPHEN 500; 5 MG/1; MG/1
TABLET ORAL ONCE
Status: CANCELLED | OUTPATIENT
Start: 2020-08-31 | End: 2020-08-31

## 2020-08-31 RX ORDER — ACETAMINOPHEN 325 MG/1
650 TABLET ORAL
Status: CANCELLED | OUTPATIENT
Start: 2020-08-31

## 2020-08-31 RX ORDER — DIPHENHYDRAMINE HCL 25 MG
25 CAPSULE ORAL
Status: CANCELLED | OUTPATIENT
Start: 2020-08-31

## 2020-08-31 NOTE — PROGRESS NOTES
Ordered 1 unit prbc for hgb of 6.0.    Altagracia Cornejo, FNP  Hematology/Oncology/Bone Marrow Transplant

## 2020-09-01 ENCOUNTER — INFUSION (OUTPATIENT)
Dept: INFUSION THERAPY | Facility: HOSPITAL | Age: 70
End: 2020-09-01
Payer: MEDICARE

## 2020-09-01 VITALS
RESPIRATION RATE: 18 BRPM | DIASTOLIC BLOOD PRESSURE: 78 MMHG | HEART RATE: 77 BPM | SYSTOLIC BLOOD PRESSURE: 128 MMHG | TEMPERATURE: 98 F

## 2020-09-01 DIAGNOSIS — D46.9 MYELODYSPLASTIC SYNDROME: ICD-10-CM

## 2020-09-01 PROCEDURE — 86922 COMPATIBILITY TEST ANTIGLOB: CPT | Mod: HCNC

## 2020-09-01 PROCEDURE — 63600175 PHARM REV CODE 636 W HCPCS: Mod: HCNC | Performed by: INTERNAL MEDICINE

## 2020-09-01 PROCEDURE — P9040 RBC LEUKOREDUCED IRRADIATED: HCPCS | Mod: HCNC

## 2020-09-01 PROCEDURE — 25000003 PHARM REV CODE 250: Mod: HCNC | Performed by: NURSE PRACTITIONER

## 2020-09-01 PROCEDURE — 36430 TRANSFUSION BLD/BLD COMPNT: CPT | Mod: HCNC

## 2020-09-01 PROCEDURE — 86902 BLOOD TYPE ANTIGEN DONOR EA: CPT | Mod: HCNC

## 2020-09-01 RX ORDER — DIPHENHYDRAMINE HCL 25 MG
25 CAPSULE ORAL
Status: COMPLETED | OUTPATIENT
Start: 2020-09-01 | End: 2020-09-01

## 2020-09-01 RX ORDER — ACETAMINOPHEN 325 MG/1
650 TABLET ORAL
Status: COMPLETED | OUTPATIENT
Start: 2020-09-01 | End: 2020-09-01

## 2020-09-01 RX ORDER — HYDROCODONE BITARTRATE AND ACETAMINOPHEN 500; 5 MG/1; MG/1
TABLET ORAL ONCE
Status: COMPLETED | OUTPATIENT
Start: 2020-09-01 | End: 2020-09-01

## 2020-09-01 RX ORDER — HEPARIN 100 UNIT/ML
500 SYRINGE INTRAVENOUS
Status: COMPLETED | OUTPATIENT
Start: 2020-09-01 | End: 2020-09-01

## 2020-09-01 RX ADMIN — SODIUM CHLORIDE: 9 INJECTION, SOLUTION INTRAVENOUS at 11:09

## 2020-09-01 RX ADMIN — ACETAMINOPHEN 650 MG: 325 TABLET ORAL at 11:09

## 2020-09-01 RX ADMIN — HEPARIN 500 UNITS: 100 SYRINGE at 01:09

## 2020-09-01 RX ADMIN — DIPHENHYDRAMINE HYDROCHLORIDE 25 MG: 25 CAPSULE ORAL at 11:09

## 2020-09-01 NOTE — PLAN OF CARE
Pt admitted for 1 unit PRBC. Labs reviewed and Pt received transfusion, tolerated well. Plan of care reviewed and Pt discharged in W/C @ 14:00

## 2020-09-03 ENCOUNTER — LAB VISIT (OUTPATIENT)
Dept: LAB | Facility: HOSPITAL | Age: 70
End: 2020-09-03
Attending: INTERNAL MEDICINE
Payer: MEDICARE

## 2020-09-03 DIAGNOSIS — D46.C MDS (MYELODYSPLASTIC SYNDROME) WITH 5Q DELETION: ICD-10-CM

## 2020-09-03 DIAGNOSIS — D46.9 MYELODYSPLASTIC SYNDROME: ICD-10-CM

## 2020-09-03 LAB
ABO + RH BLD: NORMAL
ALBUMIN SERPL BCP-MCNC: 4 G/DL (ref 3.5–5.2)
ALP SERPL-CCNC: 67 U/L (ref 55–135)
ALT SERPL W/O P-5'-P-CCNC: 80 U/L (ref 10–44)
ANION GAP SERPL CALC-SCNC: 9 MMOL/L (ref 8–16)
ANISOCYTOSIS BLD QL SMEAR: SLIGHT
AST SERPL-CCNC: 32 U/L (ref 10–40)
BASOPHILS # BLD AUTO: 0.01 K/UL (ref 0–0.2)
BASOPHILS NFR BLD: 0.5 % (ref 0–1.9)
BILIRUB SERPL-MCNC: 0.4 MG/DL (ref 0.1–1)
BLD GP AB SCN CELLS X3 SERPL QL: NORMAL
BUN SERPL-MCNC: 24 MG/DL (ref 8–23)
CALCIUM SERPL-MCNC: 9.9 MG/DL (ref 8.7–10.5)
CHLORIDE SERPL-SCNC: 103 MMOL/L (ref 95–110)
CO2 SERPL-SCNC: 25 MMOL/L (ref 23–29)
CREAT SERPL-MCNC: 1.1 MG/DL (ref 0.5–1.4)
DIFFERENTIAL METHOD: ABNORMAL
EOSINOPHIL # BLD AUTO: 0.1 K/UL (ref 0–0.5)
EOSINOPHIL NFR BLD: 4.8 % (ref 0–8)
ERYTHROCYTE [DISTWIDTH] IN BLOOD BY AUTOMATED COUNT: 19.1 % (ref 11.5–14.5)
EST. GFR  (AFRICAN AMERICAN): 58.8 ML/MIN/1.73 M^2
EST. GFR  (NON AFRICAN AMERICAN): 51 ML/MIN/1.73 M^2
GLUCOSE SERPL-MCNC: 105 MG/DL (ref 70–110)
HCT VFR BLD AUTO: 23.2 % (ref 37–48.5)
HGB BLD-MCNC: 7.7 G/DL (ref 12–16)
HYPOCHROMIA BLD QL SMEAR: ABNORMAL
IMM GRANULOCYTES # BLD AUTO: 0.04 K/UL (ref 0–0.04)
IMM GRANULOCYTES NFR BLD AUTO: 1.9 % (ref 0–0.5)
LYMPHOCYTES # BLD AUTO: 0.9 K/UL (ref 1–4.8)
LYMPHOCYTES NFR BLD: 44.8 % (ref 18–48)
MCH RBC QN AUTO: 32 PG (ref 27–31)
MCHC RBC AUTO-ENTMCNC: 33.2 G/DL (ref 32–36)
MCV RBC AUTO: 96 FL (ref 82–98)
MONOCYTES # BLD AUTO: 0 K/UL (ref 0.3–1)
MONOCYTES NFR BLD: 1 % (ref 4–15)
NEUTROPHILS # BLD AUTO: 1 K/UL (ref 1.8–7.7)
NEUTROPHILS NFR BLD: 47 % (ref 38–73)
NRBC BLD-RTO: 0 /100 WBC
OVALOCYTES BLD QL SMEAR: ABNORMAL
PLATELET # BLD AUTO: 114 K/UL (ref 150–350)
PMV BLD AUTO: 11.5 FL (ref 9.2–12.9)
POIKILOCYTOSIS BLD QL SMEAR: SLIGHT
POLYCHROMASIA BLD QL SMEAR: ABNORMAL
POTASSIUM SERPL-SCNC: 4.3 MMOL/L (ref 3.5–5.1)
PROT SERPL-MCNC: 7.3 G/DL (ref 6–8.4)
RBC # BLD AUTO: 2.41 M/UL (ref 4–5.4)
SODIUM SERPL-SCNC: 137 MMOL/L (ref 136–145)
WBC # BLD AUTO: 2.1 K/UL (ref 3.9–12.7)

## 2020-09-03 PROCEDURE — 85025 COMPLETE CBC W/AUTO DIFF WBC: CPT | Mod: HCNC

## 2020-09-03 PROCEDURE — 86901 BLOOD TYPING SEROLOGIC RH(D): CPT | Mod: HCNC

## 2020-09-03 PROCEDURE — 80053 COMPREHEN METABOLIC PANEL: CPT | Mod: HCNC

## 2020-09-03 PROCEDURE — 36415 COLL VENOUS BLD VENIPUNCTURE: CPT | Mod: HCNC

## 2020-09-08 ENCOUNTER — LAB VISIT (OUTPATIENT)
Dept: LAB | Facility: HOSPITAL | Age: 70
End: 2020-09-08
Payer: MEDICARE

## 2020-09-08 DIAGNOSIS — D64.9 ANEMIA REQUIRING TRANSFUSIONS: ICD-10-CM

## 2020-09-08 DIAGNOSIS — D46.9 MYELODYSPLASTIC SYNDROME: Primary | ICD-10-CM

## 2020-09-08 DIAGNOSIS — D46.C MDS (MYELODYSPLASTIC SYNDROME) WITH 5Q DELETION: ICD-10-CM

## 2020-09-08 DIAGNOSIS — D46.9 MYELODYSPLASTIC SYNDROME: ICD-10-CM

## 2020-09-08 LAB
ABO + RH BLD: NORMAL
ALBUMIN SERPL BCP-MCNC: 4 G/DL (ref 3.5–5.2)
ALP SERPL-CCNC: 62 U/L (ref 55–135)
ALT SERPL W/O P-5'-P-CCNC: 51 U/L (ref 10–44)
ANION GAP SERPL CALC-SCNC: 9 MMOL/L (ref 8–16)
ANISOCYTOSIS BLD QL SMEAR: SLIGHT
AST SERPL-CCNC: 24 U/L (ref 10–40)
BASOPHILS NFR BLD: 1 % (ref 0–1.9)
BILIRUB SERPL-MCNC: 0.4 MG/DL (ref 0.1–1)
BLD GP AB SCN CELLS X3 SERPL QL: NORMAL
BUN SERPL-MCNC: 39 MG/DL (ref 8–23)
CALCIUM SERPL-MCNC: 9.4 MG/DL (ref 8.7–10.5)
CHLORIDE SERPL-SCNC: 103 MMOL/L (ref 95–110)
CO2 SERPL-SCNC: 24 MMOL/L (ref 23–29)
CREAT SERPL-MCNC: 2.1 MG/DL (ref 0.5–1.4)
DACRYOCYTES BLD QL SMEAR: ABNORMAL
DIFFERENTIAL METHOD: ABNORMAL
EOSINOPHIL NFR BLD: 4 % (ref 0–8)
ERYTHROCYTE [DISTWIDTH] IN BLOOD BY AUTOMATED COUNT: 19.3 % (ref 11.5–14.5)
EST. GFR  (AFRICAN AMERICAN): 26.9 ML/MIN/1.73 M^2
EST. GFR  (NON AFRICAN AMERICAN): 23.3 ML/MIN/1.73 M^2
GLUCOSE SERPL-MCNC: 104 MG/DL (ref 70–110)
HCT VFR BLD AUTO: 21.5 % (ref 37–48.5)
HGB BLD-MCNC: 6.9 G/DL (ref 12–16)
HYPOCHROMIA BLD QL SMEAR: ABNORMAL
IMM GRANULOCYTES # BLD AUTO: ABNORMAL K/UL (ref 0–0.04)
IMM GRANULOCYTES NFR BLD AUTO: ABNORMAL % (ref 0–0.5)
LYMPHOCYTES NFR BLD: 49 % (ref 18–48)
MCH RBC QN AUTO: 31.4 PG (ref 27–31)
MCHC RBC AUTO-ENTMCNC: 32.1 G/DL (ref 32–36)
MCV RBC AUTO: 98 FL (ref 82–98)
MONOCYTES NFR BLD: 0 % (ref 4–15)
NEUTROPHILS NFR BLD: 42 % (ref 38–73)
NEUTS BAND NFR BLD MANUAL: 4 %
NRBC BLD-RTO: 0 /100 WBC
OVALOCYTES BLD QL SMEAR: ABNORMAL
PLATELET # BLD AUTO: 198 K/UL (ref 150–350)
PLATELET BLD QL SMEAR: ABNORMAL
PMV BLD AUTO: 11.3 FL (ref 9.2–12.9)
POIKILOCYTOSIS BLD QL SMEAR: SLIGHT
POLYCHROMASIA BLD QL SMEAR: ABNORMAL
POTASSIUM SERPL-SCNC: 5 MMOL/L (ref 3.5–5.1)
PROT SERPL-MCNC: 7.2 G/DL (ref 6–8.4)
RBC # BLD AUTO: 2.2 M/UL (ref 4–5.4)
SODIUM SERPL-SCNC: 136 MMOL/L (ref 136–145)
WBC # BLD AUTO: 1.62 K/UL (ref 3.9–12.7)

## 2020-09-08 PROCEDURE — 80053 COMPREHEN METABOLIC PANEL: CPT | Mod: HCNC

## 2020-09-08 PROCEDURE — 86901 BLOOD TYPING SEROLOGIC RH(D): CPT | Mod: HCNC

## 2020-09-08 PROCEDURE — 85007 BL SMEAR W/DIFF WBC COUNT: CPT | Mod: HCNC

## 2020-09-08 PROCEDURE — 36415 COLL VENOUS BLD VENIPUNCTURE: CPT | Mod: HCNC

## 2020-09-08 PROCEDURE — 85027 COMPLETE CBC AUTOMATED: CPT | Mod: HCNC

## 2020-09-08 RX ORDER — ACETAMINOPHEN 325 MG/1
650 TABLET ORAL
Status: CANCELLED | OUTPATIENT
Start: 2020-09-08

## 2020-09-08 RX ORDER — HYDROCODONE BITARTRATE AND ACETAMINOPHEN 500; 5 MG/1; MG/1
TABLET ORAL ONCE
Status: CANCELLED | OUTPATIENT
Start: 2020-09-08 | End: 2020-09-08

## 2020-09-08 RX ORDER — DIPHENHYDRAMINE HCL 25 MG
25 CAPSULE ORAL
Status: CANCELLED | OUTPATIENT
Start: 2020-09-08

## 2020-09-09 ENCOUNTER — INFUSION (OUTPATIENT)
Dept: INFUSION THERAPY | Facility: HOSPITAL | Age: 70
End: 2020-09-09
Payer: MEDICARE

## 2020-09-09 VITALS
DIASTOLIC BLOOD PRESSURE: 66 MMHG | HEART RATE: 68 BPM | OXYGEN SATURATION: 98 % | SYSTOLIC BLOOD PRESSURE: 143 MMHG | RESPIRATION RATE: 16 BRPM | TEMPERATURE: 97 F

## 2020-09-09 DIAGNOSIS — D64.9 ANEMIA REQUIRING TRANSFUSIONS: ICD-10-CM

## 2020-09-09 DIAGNOSIS — D46.9 MYELODYSPLASTIC SYNDROME: ICD-10-CM

## 2020-09-09 PROCEDURE — 36430 TRANSFUSION BLD/BLD COMPNT: CPT | Mod: HCNC

## 2020-09-09 PROCEDURE — 25000003 PHARM REV CODE 250: Mod: HCNC | Performed by: INTERNAL MEDICINE

## 2020-09-09 RX ORDER — ACETAMINOPHEN 325 MG/1
650 TABLET ORAL
Status: COMPLETED | OUTPATIENT
Start: 2020-09-09 | End: 2020-09-09

## 2020-09-09 RX ORDER — HYDROCODONE BITARTRATE AND ACETAMINOPHEN 500; 5 MG/1; MG/1
TABLET ORAL ONCE
Status: COMPLETED | OUTPATIENT
Start: 2020-09-09 | End: 2020-09-09

## 2020-09-09 RX ORDER — DIPHENHYDRAMINE HCL 25 MG
25 CAPSULE ORAL
Status: COMPLETED | OUTPATIENT
Start: 2020-09-09 | End: 2020-09-09

## 2020-09-09 RX ADMIN — ACETAMINOPHEN 650 MG: 325 TABLET ORAL at 01:09

## 2020-09-09 RX ADMIN — SODIUM CHLORIDE: 0.9 INJECTION, SOLUTION INTRAVENOUS at 01:09

## 2020-09-09 RX ADMIN — DIPHENHYDRAMINE HYDROCHLORIDE 25 MG: 25 CAPSULE ORAL at 01:09

## 2020-09-09 NOTE — PLAN OF CARE
"Pt ambulatory to clinic with her rolling walker alone for one unit of PRBC's. Pt pleasant and talkative. States "just tired" Port accessed without difficulty. Good blood return. Blood consent up to date and in chart. Blood infused without difficulty. Pt tolerated well. Port deaccessed after flushing with NS and Heparin. Ambulatory from clinic in NAD.   "

## 2020-09-11 ENCOUNTER — TELEPHONE (OUTPATIENT)
Dept: PHARMACY | Facility: CLINIC | Age: 70
End: 2020-09-11

## 2020-09-13 DIAGNOSIS — M54.31 BILATERAL SCIATICA: ICD-10-CM

## 2020-09-13 DIAGNOSIS — M54.32 BILATERAL SCIATICA: ICD-10-CM

## 2020-09-13 NOTE — PROGRESS NOTES
Subjective:       Patient ID: Susan Puente is a 70 y.o. female.    Chief Complaint: Myelodysplastic syndrome    HPI: Patient presents today alone for follow up of her history of MDS 5 q del syndrome prior to cycle 18  Vidaza as third line therapy for 5q minus syndrome. She continued to have disease per recent BM bx 10/2019. Tolerated previous therapies well. Required transfusions at times. On antimicrobials for neutropenia. Continues with chronic fatigue and leg pain. Leg pain has improved with gabapentin, Biofreeze and turmeric . Restless leg syndrome present at times,especially night time. She also has bilateral upper extremity pain, primarily at right shoulder.Patient using goat milk bar soap to avoid hair and scalp irritation. She denies fevers, chills, sob, chest pain, n/v/c. Exercise has decreased due to current COVID19 quarantine; but she has changed her diet resulting in healthy weight loss. Increased physical activity at home, delayed fitness center appointments due to pandemic situation.    Oncology History   Ms. Puente is a 68 year old female with hx of DM2, peripheral vascular disease, tobacco use, CAD, hyperlipidemia, hypertension who was hospitalized 11/10/16 for anemia. hgb 5.3  MCH 43.4 with normal WBC and platelets. Patient had normal iron stores. She was transfused 3 units of PRBCs and discharged home with hgb 8.7 on 11/11/16. She was referred for further evalutation of her anemia. On 12/16/16 patient had a normal SPEP and immunofixation. Slightly elevated kappa light chains with normal ration. Elevated vitamin b12, normal folate, JAYDEN was positive with a low titer and negative profile. Patient had a bone marrow biopsy 1/5/16 which showed the core biopsy is normocellular for age (40%); however, megakaryocytes are increased and show frequent small, hypolobated forms. Additionally, a subset of the neutrophils are hypogranular. Blasts are not increased by either morphology (1.2%) or  in the corresponding flow cytometric analysis. Fish detects a 5q deletion in 54.5% of nuclei. Cytogenetics reported 20 metaphases, 2 metaphases were normal and 18 metaphases had a 5q deletion. No additional cytogenetic abnormalities were detected. Findings consistent with 5q deletion syndrome.     Patient had a delay in obtaining Revlimid 10 mg daily but did start taking the medication 2/8/17. On 2/9/17 patient developed a diffuse maculopapular rash throughout scalp arms, legs and torso. She states she had no stridor or wheezing. She discontinued medication 2/15/17. Went to allergist who provided references on desensitization so that patient could resume Revlimid. Unfortunately developed pancytopenia and Revlimid stopped 6/16/17. She had a repeat BMBX  performed 6/8/17 and showed a hypercellular marrow (60%) continued atypia in the granulocytes and megakaryocytes were noted.  Additionally, there is erythroid atypia present. No increase in blasts. Cytogenetics are normal and MDS FISH is negative, failing to show 5 q minus. NGS should no significant molecular mutations.  Anemia work up revealed bienvenido negative hemolysis.    Revlimid has been stopped since June 2017 after she developed pancytopenia while on Revlimid (required desensitization). CBC was normal for almost 1 year and marrow was negative for del5q. Bone marrow biopsy repeat from 6/2018 showed relapsed 5q minus MDS without new or additional mutations. Revlimid restarted at 2.5 mg daily with prednisone to prevent allergic reaction. Titrated to a goal of 10mg daily Revlimid. Hospital admission 3/12-3/17/19 for unresponsive event after blood transfusion, found to be profoundly pancytopenic at admission. Since hospital admission Revlimid has been stopped. Repeat marrow March 2019 shows persistent MDS with 5q minus.     Started cycle 1 Vidaza 5/6/19 subq for 5 days every 28 days. Restaging bone marrow biopsy from 10/24/19 shows persistent MDS, no excess blasts  and 3 of 20 metaphases with 5q deletion.    Review of Systems   Constitutional: Negative for fever, chills, weight loss, fatigue.   HENT: Negative for sinus congestion or rhinorrhea. Negative for ear pain, mouth sores, nosebleeds and trouble swallowing.    Respiratory: Negative for cough, shortness of breath and wheezing.    Cardiovascular: Negative for chest pain and leg swelling.   Gastrointestinal: Negative for abdominal distention, abdominal pain, blood in stool, nausea and vomiting.   Endocrine: Negative for polyphagia and polyuria.   Genitourinary: Negative for dysuria, hematuria and urgency.   Musculoskeletal: Positive for sciatic pain much improved with gabapentin. Right shoulder pain and limited ROM.   Skin: Negative for color change, pallor and rash.   Neurological: Negative for dizziness, weakness, light-headedness and headaches.   Hematological: Negative for adenopathy. Does not bruise/bleed easily.   Psychiatric/Behavioral: Negative for agitation and behavioral problems.       Objective:      Physical Exam   Constitutional: She is oriented to person, place, and time. She appears well-developed and well-nourished.   Mouth/Throat: Oropharynx is clear and moist. No oropharyngeal exudate.   Eyes: Conjunctivae and EOM are normal. Right eye exhibits no discharge. Left eye exhibits no discharge.   Neck: Normal range of motion. Neck supple.   Cardiovascular: Normal rate, regular rhythm, normal heart sounds and intact distal pulses.   No murmur heard.  Pulmonary/Chest: Effort normal and breath sounds normal. No respiratory distress. She has no wheezes.   Abdominal: Soft. Bowel sounds are normal. She exhibits no distension and no mass. There is no tenderness.   Musculoskeletal: Normal range of motion. She exhibits no edema.   Neurological: She is alert and oriented to person, place, and time.   Skin: Skin is warm and dry. No rash noted.   Psychiatric: She has a normal mood and affect. Her behavior is normal.  Judgment and thought content normal.   Nursing note and vitals reviewed.      Assessment:       1. Myelodysplastic syndrome    2. Bilateral sciatica    3. Cytopenia    4. Controlled type 2 diabetes mellitus with stage 3 chronic kidney disease, without long-term current use of insulin    5. Essential hypertension    6. Coronary artery disease, angina presence unspecified, unspecified vessel or lesion type, unspecified whether native or transplanted heart    7. Adjustment disorder with mixed anxiety and depressed mood    8. Myalgia    9. Insomnia, unspecified type        Plan:     MDS  - Initially with 5 q minus syndrome and treated with Revlimid. Developed allergy and was then desensitized. Soon after developed pancytopenia and Revlimid held since June 2017.    - BMBX on 6/8/17 was with normal cytogenetics. 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 Discussed treatment options of HMA therapy or repeat trial of Revlimid and patient wished to proceed with Revlimid   - Revlimid stopped again due to repeat allergic reaction and pancytopenia 3/2019  - Started cycle 1 Vidaza 5/6/19 subq for 5 days every 28 days  - Restaging bone marrow biopsy following cycle 5 from 10/24/19 shows persistent MDS, no excess blasts and 3 of 20 metaphases with 5q deletion  - Tolerated cycles 1-17 well thus far. Package insert for Vidaza recommends holding if ANC <1000, however pt has been receiving this with an ANC below 1000 for each cycle. Okay to continue to give despite neutropenia. Will proceed with C18 today  -Desired repeat bone marrow biopsy Spring 2020, unable to perform with sedation due to COVID19  - CBC is stable; platelets actually normalized. Continue to treat with Vidaza    Cytopenias 2/2 disease: anemia and leukopenia  - Transfuse for hgb < 7 or plts < 10K  - Continue ppx cipro, acyclovir, and fluconazole  - Transfuse 8/17/2020 for hemoglobin of 7    DM2  - management per PCP  - continue metformin    HTN  - management  per PCP  - continue lisinopril-HCTZ    CAD  - continue Repatha for HLD per PCP (intolerant to statins)  - Not taking ASA now    Adjustment disorder  - Improved mood on Celexa. Continue    Myalgias/possible sciatica  - started trial of gabapentin 100 mg bid 11/4/19, much improved; now only taking gabapentin once a day in AM  - patient to return to [facility]. Pending decision due to covid    Insonmia  - using melatonin OTC    Follow up:  - Please schedule CBC and type and screen twice weekly on Mondays and Thursday for possible transfusions on Tuesday& Fridays through months of September and October  - Schedule return visit with CBC, type and screen, CMP and appt with Dr. Valdes or NP 10/12  - Schedule chemo chair for cycle 19 vidaza 10/12-10/16      Kourtney Daly NP  Hematology/Oncology/BMT

## 2020-09-14 ENCOUNTER — OFFICE VISIT (OUTPATIENT)
Dept: HEMATOLOGY/ONCOLOGY | Facility: CLINIC | Age: 70
End: 2020-09-14
Payer: MEDICARE

## 2020-09-14 ENCOUNTER — INFUSION (OUTPATIENT)
Dept: INFUSION THERAPY | Facility: HOSPITAL | Age: 70
End: 2020-09-14
Payer: MEDICARE

## 2020-09-14 VITALS
SYSTOLIC BLOOD PRESSURE: 138 MMHG | TEMPERATURE: 98 F | RESPIRATION RATE: 18 BRPM | DIASTOLIC BLOOD PRESSURE: 64 MMHG | HEART RATE: 78 BPM

## 2020-09-14 VITALS
BODY MASS INDEX: 28 KG/M2 | RESPIRATION RATE: 20 BRPM | HEART RATE: 76 BPM | SYSTOLIC BLOOD PRESSURE: 137 MMHG | TEMPERATURE: 98 F | OXYGEN SATURATION: 98 % | WEIGHT: 174.25 LBS | DIASTOLIC BLOOD PRESSURE: 63 MMHG | HEIGHT: 66 IN

## 2020-09-14 DIAGNOSIS — E11.22 CONTROLLED TYPE 2 DIABETES MELLITUS WITH STAGE 3 CHRONIC KIDNEY DISEASE, WITHOUT LONG-TERM CURRENT USE OF INSULIN: ICD-10-CM

## 2020-09-14 DIAGNOSIS — M79.10 MYALGIA: ICD-10-CM

## 2020-09-14 DIAGNOSIS — D75.9 CYTOPENIA: ICD-10-CM

## 2020-09-14 DIAGNOSIS — G47.00 INSOMNIA, UNSPECIFIED TYPE: ICD-10-CM

## 2020-09-14 DIAGNOSIS — M54.32 BILATERAL SCIATICA: ICD-10-CM

## 2020-09-14 DIAGNOSIS — I10 ESSENTIAL HYPERTENSION: ICD-10-CM

## 2020-09-14 DIAGNOSIS — E87.6 HYPOKALEMIA: ICD-10-CM

## 2020-09-14 DIAGNOSIS — F43.23 ADJUSTMENT DISORDER WITH MIXED ANXIETY AND DEPRESSED MOOD: ICD-10-CM

## 2020-09-14 DIAGNOSIS — N18.30 CONTROLLED TYPE 2 DIABETES MELLITUS WITH STAGE 3 CHRONIC KIDNEY DISEASE, WITHOUT LONG-TERM CURRENT USE OF INSULIN: ICD-10-CM

## 2020-09-14 DIAGNOSIS — I25.10 CORONARY ARTERY DISEASE, ANGINA PRESENCE UNSPECIFIED, UNSPECIFIED VESSEL OR LESION TYPE, UNSPECIFIED WHETHER NATIVE OR TRANSPLANTED HEART: ICD-10-CM

## 2020-09-14 DIAGNOSIS — D46.9 MYELODYSPLASTIC SYNDROME: Primary | ICD-10-CM

## 2020-09-14 DIAGNOSIS — D46.C MDS (MYELODYSPLASTIC SYNDROME) WITH 5Q DELETION: Primary | ICD-10-CM

## 2020-09-14 DIAGNOSIS — M54.31 BILATERAL SCIATICA: ICD-10-CM

## 2020-09-14 PROCEDURE — 99213 PR OFFICE/OUTPT VISIT, EST, LEVL III, 20-29 MIN: ICD-10-PCS | Mod: HCNC,S$GLB,, | Performed by: NURSE PRACTITIONER

## 2020-09-14 PROCEDURE — 3075F PR MOST RECENT SYSTOLIC BLOOD PRESS GE 130-139MM HG: ICD-10-PCS | Mod: HCNC,CPTII,S$GLB, | Performed by: NURSE PRACTITIONER

## 2020-09-14 PROCEDURE — 1159F MED LIST DOCD IN RCRD: CPT | Mod: HCNC,S$GLB,, | Performed by: NURSE PRACTITIONER

## 2020-09-14 PROCEDURE — 96401 CHEMO ANTI-NEOPL SQ/IM: CPT | Mod: HCNC

## 2020-09-14 PROCEDURE — 1125F PR PAIN SEVERITY QUANTIFIED, PAIN PRESENT: ICD-10-PCS | Mod: HCNC,S$GLB,, | Performed by: NURSE PRACTITIONER

## 2020-09-14 PROCEDURE — 3008F PR BODY MASS INDEX (BMI) DOCUMENTED: ICD-10-PCS | Mod: HCNC,CPTII,S$GLB, | Performed by: NURSE PRACTITIONER

## 2020-09-14 PROCEDURE — 3044F HG A1C LEVEL LT 7.0%: CPT | Mod: HCNC,CPTII,S$GLB, | Performed by: NURSE PRACTITIONER

## 2020-09-14 PROCEDURE — 99999 PR PBB SHADOW E&M-EST. PATIENT-LVL V: CPT | Mod: PBBFAC,HCNC,, | Performed by: NURSE PRACTITIONER

## 2020-09-14 PROCEDURE — 1101F PT FALLS ASSESS-DOCD LE1/YR: CPT | Mod: HCNC,CPTII,S$GLB, | Performed by: NURSE PRACTITIONER

## 2020-09-14 PROCEDURE — 1125F AMNT PAIN NOTED PAIN PRSNT: CPT | Mod: HCNC,S$GLB,, | Performed by: NURSE PRACTITIONER

## 2020-09-14 PROCEDURE — 1159F PR MEDICATION LIST DOCUMENTED IN MEDICAL RECORD: ICD-10-PCS | Mod: HCNC,S$GLB,, | Performed by: NURSE PRACTITIONER

## 2020-09-14 PROCEDURE — 3078F PR MOST RECENT DIASTOLIC BLOOD PRESSURE < 80 MM HG: ICD-10-PCS | Mod: HCNC,CPTII,S$GLB, | Performed by: NURSE PRACTITIONER

## 2020-09-14 PROCEDURE — 99213 OFFICE O/P EST LOW 20 MIN: CPT | Mod: HCNC,S$GLB,, | Performed by: NURSE PRACTITIONER

## 2020-09-14 PROCEDURE — 1101F PR PT FALLS ASSESS DOC 0-1 FALLS W/OUT INJ PAST YR: ICD-10-PCS | Mod: HCNC,CPTII,S$GLB, | Performed by: NURSE PRACTITIONER

## 2020-09-14 PROCEDURE — 3075F SYST BP GE 130 - 139MM HG: CPT | Mod: HCNC,CPTII,S$GLB, | Performed by: NURSE PRACTITIONER

## 2020-09-14 PROCEDURE — 3008F BODY MASS INDEX DOCD: CPT | Mod: HCNC,CPTII,S$GLB, | Performed by: NURSE PRACTITIONER

## 2020-09-14 PROCEDURE — 63600175 PHARM REV CODE 636 W HCPCS: Mod: JG,HCNC | Performed by: INTERNAL MEDICINE

## 2020-09-14 PROCEDURE — 3044F PR MOST RECENT HEMOGLOBIN A1C LEVEL <7.0%: ICD-10-PCS | Mod: HCNC,CPTII,S$GLB, | Performed by: NURSE PRACTITIONER

## 2020-09-14 PROCEDURE — 3078F DIAST BP <80 MM HG: CPT | Mod: HCNC,CPTII,S$GLB, | Performed by: NURSE PRACTITIONER

## 2020-09-14 PROCEDURE — 99999 PR PBB SHADOW E&M-EST. PATIENT-LVL V: ICD-10-PCS | Mod: PBBFAC,HCNC,, | Performed by: NURSE PRACTITIONER

## 2020-09-14 RX ORDER — ONDANSETRON 4 MG/1
8 TABLET, ORALLY DISINTEGRATING ORAL ONCE
Status: CANCELLED | OUTPATIENT
Start: 2020-09-16

## 2020-09-14 RX ORDER — AZACITIDINE 100 MG/1
75 INJECTION, POWDER, LYOPHILIZED, FOR SOLUTION INTRAVENOUS; SUBCUTANEOUS
Status: CANCELLED | OUTPATIENT
Start: 2020-09-16

## 2020-09-14 RX ORDER — ONDANSETRON 4 MG/1
8 TABLET, ORALLY DISINTEGRATING ORAL ONCE
Status: CANCELLED | OUTPATIENT
Start: 2020-09-15

## 2020-09-14 RX ORDER — AZACITIDINE 100 MG/1
75 INJECTION, POWDER, LYOPHILIZED, FOR SOLUTION INTRAVENOUS; SUBCUTANEOUS
Status: COMPLETED | OUTPATIENT
Start: 2020-09-14 | End: 2020-09-14

## 2020-09-14 RX ORDER — ONDANSETRON 4 MG/1
8 TABLET, ORALLY DISINTEGRATING ORAL ONCE
Status: CANCELLED | OUTPATIENT
Start: 2020-09-14

## 2020-09-14 RX ORDER — AZACITIDINE 100 MG/1
75 INJECTION, POWDER, LYOPHILIZED, FOR SOLUTION INTRAVENOUS; SUBCUTANEOUS
Status: CANCELLED | OUTPATIENT
Start: 2020-09-15

## 2020-09-14 RX ORDER — GABAPENTIN 100 MG/1
100 CAPSULE ORAL DAILY
Qty: 60 CAPSULE | Refills: 2 | Status: SHIPPED | OUTPATIENT
Start: 2020-09-14 | End: 2021-02-01

## 2020-09-14 RX ORDER — AZACITIDINE 100 MG/1
75 INJECTION, POWDER, LYOPHILIZED, FOR SOLUTION INTRAVENOUS; SUBCUTANEOUS
Status: CANCELLED | OUTPATIENT
Start: 2020-09-14

## 2020-09-14 RX ORDER — AZACITIDINE 100 MG/1
75 INJECTION, POWDER, LYOPHILIZED, FOR SOLUTION INTRAVENOUS; SUBCUTANEOUS
Status: CANCELLED | OUTPATIENT
Start: 2020-09-17

## 2020-09-14 RX ORDER — AZACITIDINE 100 MG/1
75 INJECTION, POWDER, LYOPHILIZED, FOR SOLUTION INTRAVENOUS; SUBCUTANEOUS
Status: CANCELLED | OUTPATIENT
Start: 2020-09-18

## 2020-09-14 RX ORDER — ONDANSETRON 4 MG/1
8 TABLET, ORALLY DISINTEGRATING ORAL ONCE
Status: CANCELLED | OUTPATIENT
Start: 2020-09-18

## 2020-09-14 RX ORDER — ONDANSETRON 4 MG/1
8 TABLET, ORALLY DISINTEGRATING ORAL ONCE
Status: DISCONTINUED | OUTPATIENT
Start: 2020-09-14 | End: 2020-09-14 | Stop reason: HOSPADM

## 2020-09-14 RX ORDER — ONDANSETRON 4 MG/1
8 TABLET, ORALLY DISINTEGRATING ORAL ONCE
Status: CANCELLED | OUTPATIENT
Start: 2020-09-17

## 2020-09-14 RX ADMIN — AZACITIDINE 150 MG: 100 INJECTION, POWDER, LYOPHILIZED, FOR SOLUTION INTRAVENOUS; SUBCUTANEOUS at 12:09

## 2020-09-14 NOTE — Clinical Note
- Please schedule CBC and type and screen twice weekly on Mondays and Thursday for possible transfusions on Tuesday& Fridays through months of September and October  - Schedule return visit with CBC, type and screen, CMP and appt with Dr. Valdes or NP 10/12  - Schedule chemo chair for cycle 19 vidaza 10/12-10/16

## 2020-09-14 NOTE — NURSING
1245 pt here for vidaza injections, tolerated well to abdomen x3, pt to rtc tomorrow for vidaza, no distress noted upon d/c to home

## 2020-09-15 ENCOUNTER — INFUSION (OUTPATIENT)
Dept: INFUSION THERAPY | Facility: HOSPITAL | Age: 70
End: 2020-09-15
Payer: MEDICARE

## 2020-09-15 VITALS — SYSTOLIC BLOOD PRESSURE: 176 MMHG | TEMPERATURE: 99 F | HEART RATE: 81 BPM | DIASTOLIC BLOOD PRESSURE: 70 MMHG

## 2020-09-15 DIAGNOSIS — E87.6 HYPOKALEMIA: ICD-10-CM

## 2020-09-15 DIAGNOSIS — D46.C MDS (MYELODYSPLASTIC SYNDROME) WITH 5Q DELETION: Primary | ICD-10-CM

## 2020-09-15 PROCEDURE — 63600175 PHARM REV CODE 636 W HCPCS: Mod: JG,HCNC | Performed by: INTERNAL MEDICINE

## 2020-09-15 PROCEDURE — 96401 CHEMO ANTI-NEOPL SQ/IM: CPT | Mod: HCNC

## 2020-09-15 RX ORDER — AZACITIDINE 100 MG/1
75 INJECTION, POWDER, LYOPHILIZED, FOR SOLUTION INTRAVENOUS; SUBCUTANEOUS
Status: COMPLETED | OUTPATIENT
Start: 2020-09-15 | End: 2020-09-15

## 2020-09-15 RX ORDER — ONDANSETRON 4 MG/1
8 TABLET, ORALLY DISINTEGRATING ORAL ONCE
Status: DISCONTINUED | OUTPATIENT
Start: 2020-09-15 | End: 2020-09-15 | Stop reason: HOSPADM

## 2020-09-15 RX ADMIN — AZACITIDINE 150 MG: 100 INJECTION, POWDER, LYOPHILIZED, FOR SOLUTION INTRAVENOUS; SUBCUTANEOUS at 11:09

## 2020-09-16 ENCOUNTER — INFUSION (OUTPATIENT)
Dept: INFUSION THERAPY | Facility: HOSPITAL | Age: 70
End: 2020-09-16
Payer: MEDICARE

## 2020-09-16 VITALS
RESPIRATION RATE: 18 BRPM | SYSTOLIC BLOOD PRESSURE: 133 MMHG | TEMPERATURE: 99 F | DIASTOLIC BLOOD PRESSURE: 63 MMHG | HEART RATE: 99 BPM

## 2020-09-16 DIAGNOSIS — D46.C MDS (MYELODYSPLASTIC SYNDROME) WITH 5Q DELETION: Primary | ICD-10-CM

## 2020-09-16 DIAGNOSIS — E87.6 HYPOKALEMIA: ICD-10-CM

## 2020-09-16 PROCEDURE — 96401 CHEMO ANTI-NEOPL SQ/IM: CPT | Mod: HCNC

## 2020-09-16 PROCEDURE — 63600175 PHARM REV CODE 636 W HCPCS: Mod: JG,HCNC | Performed by: INTERNAL MEDICINE

## 2020-09-16 RX ORDER — ONDANSETRON 4 MG/1
8 TABLET, ORALLY DISINTEGRATING ORAL ONCE
Status: DISCONTINUED | OUTPATIENT
Start: 2020-09-16 | End: 2020-09-16 | Stop reason: HOSPADM

## 2020-09-16 RX ORDER — AZACITIDINE 100 MG/1
75 INJECTION, POWDER, LYOPHILIZED, FOR SOLUTION INTRAVENOUS; SUBCUTANEOUS
Status: COMPLETED | OUTPATIENT
Start: 2020-09-16 | End: 2020-09-16

## 2020-09-16 RX ADMIN — AZACITIDINE 150 MG: 100 INJECTION, POWDER, LYOPHILIZED, FOR SOLUTION INTRAVENOUS; SUBCUTANEOUS at 12:09

## 2020-09-17 ENCOUNTER — INFUSION (OUTPATIENT)
Dept: INFUSION THERAPY | Facility: HOSPITAL | Age: 70
End: 2020-09-17
Payer: MEDICARE

## 2020-09-17 VITALS
DIASTOLIC BLOOD PRESSURE: 65 MMHG | RESPIRATION RATE: 18 BRPM | SYSTOLIC BLOOD PRESSURE: 142 MMHG | HEART RATE: 86 BPM | TEMPERATURE: 99 F

## 2020-09-17 DIAGNOSIS — E87.6 HYPOKALEMIA: ICD-10-CM

## 2020-09-17 DIAGNOSIS — D46.C MDS (MYELODYSPLASTIC SYNDROME) WITH 5Q DELETION: Primary | ICD-10-CM

## 2020-09-17 PROCEDURE — 96401 CHEMO ANTI-NEOPL SQ/IM: CPT | Mod: HCNC

## 2020-09-17 PROCEDURE — 63600175 PHARM REV CODE 636 W HCPCS: Mod: JG,HCNC | Performed by: INTERNAL MEDICINE

## 2020-09-17 RX ORDER — AZACITIDINE 100 MG/1
75 INJECTION, POWDER, LYOPHILIZED, FOR SOLUTION INTRAVENOUS; SUBCUTANEOUS
Status: COMPLETED | OUTPATIENT
Start: 2020-09-17 | End: 2020-09-17

## 2020-09-17 RX ORDER — ONDANSETRON 4 MG/1
8 TABLET, ORALLY DISINTEGRATING ORAL ONCE
Status: DISCONTINUED | OUTPATIENT
Start: 2020-09-17 | End: 2020-09-17 | Stop reason: HOSPADM

## 2020-09-17 RX ADMIN — AZACITIDINE 150 MG: 100 INJECTION, POWDER, LYOPHILIZED, FOR SOLUTION INTRAVENOUS; SUBCUTANEOUS at 10:09

## 2020-09-18 ENCOUNTER — INFUSION (OUTPATIENT)
Dept: INFUSION THERAPY | Facility: HOSPITAL | Age: 70
End: 2020-09-18
Payer: MEDICARE

## 2020-09-18 VITALS
SYSTOLIC BLOOD PRESSURE: 172 MMHG | RESPIRATION RATE: 18 BRPM | DIASTOLIC BLOOD PRESSURE: 74 MMHG | HEART RATE: 80 BPM | TEMPERATURE: 98 F

## 2020-09-18 DIAGNOSIS — E11.9 TYPE 2 DIABETES MELLITUS WITHOUT COMPLICATION: ICD-10-CM

## 2020-09-18 DIAGNOSIS — D46.C MDS (MYELODYSPLASTIC SYNDROME) WITH 5Q DELETION: Primary | ICD-10-CM

## 2020-09-18 DIAGNOSIS — E87.6 HYPOKALEMIA: ICD-10-CM

## 2020-09-18 PROCEDURE — 96401 CHEMO ANTI-NEOPL SQ/IM: CPT | Mod: HCNC

## 2020-09-18 PROCEDURE — 63600175 PHARM REV CODE 636 W HCPCS: Mod: JG,HCNC | Performed by: INTERNAL MEDICINE

## 2020-09-18 RX ORDER — ONDANSETRON 4 MG/1
8 TABLET, ORALLY DISINTEGRATING ORAL ONCE
Status: DISCONTINUED | OUTPATIENT
Start: 2020-09-18 | End: 2020-09-18 | Stop reason: HOSPADM

## 2020-09-18 RX ORDER — AZACITIDINE 100 MG/1
75 INJECTION, POWDER, LYOPHILIZED, FOR SOLUTION INTRAVENOUS; SUBCUTANEOUS
Status: COMPLETED | OUTPATIENT
Start: 2020-09-18 | End: 2020-09-18

## 2020-09-18 RX ADMIN — AZACITIDINE 150 MG: 100 INJECTION, POWDER, LYOPHILIZED, FOR SOLUTION INTRAVENOUS; SUBCUTANEOUS at 11:09

## 2020-09-21 ENCOUNTER — LAB VISIT (OUTPATIENT)
Dept: LAB | Facility: HOSPITAL | Age: 70
End: 2020-09-21
Attending: INTERNAL MEDICINE
Payer: MEDICARE

## 2020-09-21 DIAGNOSIS — D46.9 MYELODYSPLASTIC SYNDROME: ICD-10-CM

## 2020-09-21 DIAGNOSIS — D46.C MDS (MYELODYSPLASTIC SYNDROME) WITH 5Q DELETION: ICD-10-CM

## 2020-09-21 LAB
ABO + RH BLD: NORMAL
ALBUMIN SERPL BCP-MCNC: 4 G/DL (ref 3.5–5.2)
ALP SERPL-CCNC: 61 U/L (ref 55–135)
ALT SERPL W/O P-5'-P-CCNC: 59 U/L (ref 10–44)
ANION GAP SERPL CALC-SCNC: 12 MMOL/L (ref 8–16)
ANISOCYTOSIS BLD QL SMEAR: SLIGHT
AST SERPL-CCNC: 36 U/L (ref 10–40)
BASOPHILS # BLD AUTO: 0.03 K/UL (ref 0–0.2)
BASOPHILS NFR BLD: 0.8 % (ref 0–1.9)
BILIRUB SERPL-MCNC: 0.3 MG/DL (ref 0.1–1)
BLD GP AB SCN CELLS X3 SERPL QL: NORMAL
BUN SERPL-MCNC: 32 MG/DL (ref 8–23)
CALCIUM SERPL-MCNC: 9.7 MG/DL (ref 8.7–10.5)
CHLORIDE SERPL-SCNC: 98 MMOL/L (ref 95–110)
CO2 SERPL-SCNC: 27 MMOL/L (ref 23–29)
CREAT SERPL-MCNC: 1.6 MG/DL (ref 0.5–1.4)
DIFFERENTIAL METHOD: ABNORMAL
EOSINOPHIL # BLD AUTO: 0.1 K/UL (ref 0–0.5)
EOSINOPHIL NFR BLD: 2.8 % (ref 0–8)
ERYTHROCYTE [DISTWIDTH] IN BLOOD BY AUTOMATED COUNT: 19.6 % (ref 11.5–14.5)
EST. GFR  (AFRICAN AMERICAN): 37.4 ML/MIN/1.73 M^2
EST. GFR  (NON AFRICAN AMERICAN): 32.4 ML/MIN/1.73 M^2
GLUCOSE SERPL-MCNC: 136 MG/DL (ref 70–110)
HCT VFR BLD AUTO: 23.3 % (ref 37–48.5)
HGB BLD-MCNC: 7.3 G/DL (ref 12–16)
HYPOCHROMIA BLD QL SMEAR: ABNORMAL
IMM GRANULOCYTES # BLD AUTO: 0.03 K/UL (ref 0–0.04)
IMM GRANULOCYTES NFR BLD AUTO: 0.8 % (ref 0–0.5)
LYMPHOCYTES # BLD AUTO: 1.6 K/UL (ref 1–4.8)
LYMPHOCYTES NFR BLD: 43 % (ref 18–48)
MCH RBC QN AUTO: 30.5 PG (ref 27–31)
MCHC RBC AUTO-ENTMCNC: 31.3 G/DL (ref 32–36)
MCV RBC AUTO: 98 FL (ref 82–98)
MONOCYTES # BLD AUTO: 0.2 K/UL (ref 0.3–1)
MONOCYTES NFR BLD: 4.1 % (ref 4–15)
NEUTROPHILS # BLD AUTO: 1.8 K/UL (ref 1.8–7.7)
NEUTROPHILS NFR BLD: 48.5 % (ref 38–73)
NRBC BLD-RTO: 0 /100 WBC
OVALOCYTES BLD QL SMEAR: ABNORMAL
PLATELET # BLD AUTO: 242 K/UL (ref 150–350)
PMV BLD AUTO: 11.2 FL (ref 9.2–12.9)
POIKILOCYTOSIS BLD QL SMEAR: SLIGHT
POLYCHROMASIA BLD QL SMEAR: ABNORMAL
POTASSIUM SERPL-SCNC: 4 MMOL/L (ref 3.5–5.1)
PROT SERPL-MCNC: 7.5 G/DL (ref 6–8.4)
RBC # BLD AUTO: 2.39 M/UL (ref 4–5.4)
SODIUM SERPL-SCNC: 137 MMOL/L (ref 136–145)
WBC # BLD AUTO: 3.63 K/UL (ref 3.9–12.7)

## 2020-09-21 PROCEDURE — 36415 COLL VENOUS BLD VENIPUNCTURE: CPT | Mod: HCNC

## 2020-09-21 PROCEDURE — 80053 COMPREHEN METABOLIC PANEL: CPT | Mod: HCNC

## 2020-09-21 PROCEDURE — 86850 RBC ANTIBODY SCREEN: CPT | Mod: HCNC

## 2020-09-21 PROCEDURE — 85025 COMPLETE CBC W/AUTO DIFF WBC: CPT | Mod: HCNC

## 2020-09-23 NOTE — PROGRESS NOTES
The patient location is: home  The chief complaint leading to consultation is: MDS  Visit type: Virtual visit with audio   Total time spent with patient: 20  Each patient to whom he or she provides medical services by telemedicine is:  (1) informed of the relationship between the physician and patient and the respective role of any other health care provider with respect to management of the patient; and (2) notified that he or she may decline to receive medical services by telemedicine and may withdraw from such care at any time.      Subjective:       Patient ID: Susan Puente is a 69 y.o. female.    Chief Complaint: No chief complaint on file.    HPI: Patient presents today alone for follow up of her history of MDS 5 q del syndrome prior to cycle 12 Vidaza as third line therapy for 5q minus syndrome. She continues to have disease per recent BM bx 10/2019. Continues with chronic fatigue and leg pain. Leg pain has improved with gabapentin. Notes she is finally having hair loss. She denies fevers, chills, sob, chest pain, n/v/c. Exercise has decreased due to current COVID19 quarantine.    Oncology History   Ms. Puente is a 68 year old female with hx of DM2, peripheral vascular disease, tobacco use, CAD, hyperlipidemia, hypertension who was hospitalized 11/10/16 for anemia. hgb 5.3  MCH 43.4 with normal WBC and platelets. Patient had normal iron stores. She was transfused 3 units of PRBCs and discharged home with hgb 8.7 on 11/11/16. She was referred for further evalutation of her anemia. On 12/16/16 patient had a normal SPEP and immunofixation. Slightly elevated kappa light chains with normal ration. Elevated vitamin b12, normal folate, JAYDEN was positive with a low titer and negative profile. Patient had a bone marrow biopsy 1/5/16 which showed the core biopsy is normocellular for age (40%); however, megakaryocytes are increased and show frequent small, hypolobated forms. Additionally, a subset of the  neutrophils are hypogranular. Blasts are not increased by either morphology (1.2%) or in the corresponding flow cytometric analysis. Fish detects a 5q deletion in 54.5% of nuclei. Cytogenetics reported 20 metaphases, 2 metaphases were normal and 18 metaphases had a 5q deletion. No additional cytogenetic abnormalities were detected. Findings consistent with 5q deletion syndrome.     Patient had a delay in obtaining Revlimid 10 mg daily but did start taking the medication 2/8/17. On 2/9/17 patient developed a diffuse maculopapular rash throughout scalp arms, legs and torso. She states she had no stridor or wheezing. She discontinued medication 2/15/17. Went to allergist who provided references on desensitization so that patient could resume Revlimid. Unfortunately developed pancytopenia and Revlimid stopped 6/16/17. She had a repeat BMBX  performed 6/8/17 and showed a hypercellular marrow (60%) continued atypia in the granulocytes and megakaryocytes were noted.  Additionally, there is erythroid atypia present. No increase in blasts. Cytogenetics are normal and MDS FISH is negative, failing to show 5 q minus. NGS should no significant molecular mutations.  Anemia work up revealed bienvenido negative hemolysis.    Revlimid has been stopped since June 2017 after she developed pancytopenia while on Revlimid (required desensitization). CBC was normal for almost 1 year and marrow was negative for del5q. Bone marrow biopsy repeat from 6/2018 showed relapsed 5q minus MDS without new or additional mutations. Revlimid restarted at 2.5 mg daily with prednisone to prevent allergic reaction. Titrated to a goal of 10mg daily Revlimid. Hospital admission 3/12-3/17/19 for unresponsive event after blood transfusion, found to be profoundly pancytopenic at admission. Since hospital admission Revlimid has been stopped. Repeat marrow March 2019 shows persistent MDS with 5q minus.     Started cycle 1 Vidaza 5/6/19 subq for 5 days every 28  days. Restaging bone marrow biopsy from 10/24/19 shows persistent MDS, no excess blasts and 3 of 20 metaphases with 5q deletion.    Review of Systems   Constitutional: Negative for fever, chills, weight loss, fatigue.   HENT: Negative for sinus congestion or rhinorrhea. Negative for ear pain, mouth sores, nosebleeds and trouble swallowing.    Respiratory: Negative for cough, shortness of breath and wheezing.    Cardiovascular: Negative for chest pain and leg swelling.   Gastrointestinal: +diarrhea; Negative for abdominal distention, abdominal pain, blood in stool, nausea and vomiting.   Endocrine: Negative for polyphagia and polyuria.   Genitourinary: Negative for dysuria, hematuria and urgency.   Musculoskeletal: Positive for sciatic pain much improved.   Skin: Negative for color change, pallor and rash.   Neurological: Negative for dizziness, weakness, light-headedness and headaches.   Hematological: Negative for adenopathy. Does not bruise/bleed easily.   Psychiatric/Behavioral: Negative for agitation and behavioral problems.       Objective:    No exam due to telehealth visit; COVID19  Physical Exam   Constitutional: She is oriented to person, place, and time. She appears well-developed and well-nourished.   Mouth/Throat: Oropharynx is clear and moist. No oropharyngeal exudate.   Eyes: Conjunctivae and EOM are normal. Right eye exhibits no discharge. Left eye exhibits no discharge.   Neck: Normal range of motion. Neck supple.   Cardiovascular: Normal rate, regular rhythm, normal heart sounds and intact distal pulses.   No murmur heard.  Pulmonary/Chest: Effort normal and breath sounds normal. No respiratory distress. She has no wheezes.   Abdominal: Soft. Bowel sounds are normal. She exhibits no distension and no mass. There is no tenderness.   Musculoskeletal: Normal range of motion. She exhibits no edema.   Neurological: She is alert and oriented to person, place, and time.   Skin: Skin is warm and dry. No  rash noted.   Psychiatric: She has a normal mood and affect. Her behavior is normal. Judgment and thought content normal.   Nursing note and vitals reviewed.      Assessment:       1. MDS (myelodysplastic syndrome) with 5q deletion    2. Anemia requiring transfusions        Plan:     MDS  - Initially with 5 q minus syndrome and treated with Revlimid. Developed allergy and was then desensitized. Soon after developed pancytopenia and Revlimid held since June 2017.    - BMBX on 6/8/17 was with normal cytogenetics. 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 Discussed treatment options of HMA therapy or repeat trial of Revlimid and patient wished to proceed with Revlimid   - Revlimid stopped again due to repeat allergic reaction and pancytopenia 3/2019  - Started cycle 1 Vidaza 5/6/19 subq for 5 days every 28 days  - Restaging bone marrow biopsy following cycle 5 from 10/24/19 shows persistent MDS, no excess blasts and 3 of 20 metaphases with 5q deletion  - Tolerated cycles 1-9 well thus far. Package insert for Vidaza recommends holding if ANC <1000, however pt has been receiving this with an ANC below 1000 for each cycle. Per beverly Delaney to continue to give despite neutropenia. Will proceed with C11 today  -Plan to repeat marrow biopsy in April 2020; with sedation after Easter    Cytopenias 2/2 disease: anemia and leukopenia  - Transfuse for hgb < 7 or plts < 10K  - Continue ppx cipro, acyclovir, and fluconazole    DM2  - management per PCP  - continue metformin    HTN  - management per PCP  - continue lisinopril-HCTZ    CAD  - continue praluent for HLD per PCP (intolerant to statins)  - continue ASA    Adjustment disorder  - Improved mood on Celexa. Continue    Myalgias/possible sciatica  - started trial of gabapentin 100 mg bid 11/4/19, much improved; now only taking gabapentin once a day in AM  - patient has returned to [place] [employer] [place] and is now doing silver sneakers with a friend; she is very  excited about this (3x per week)    Insonmia  - using melatonin OTC    Follow up:  - Please schedule CBC and type and screen weekly on Mondays for possible transfusions through month of April  - Schedule return visit with CBC, type and screen, CMP and appt with Dr. Valdes 4/27  - Schedule chemo chair for cycle 12 vidaza 4/27-5/1  - cancel bone marrow biopsy             Stable

## 2020-09-24 ENCOUNTER — PATIENT MESSAGE (OUTPATIENT)
Dept: HEMATOLOGY/ONCOLOGY | Facility: CLINIC | Age: 70
End: 2020-09-24

## 2020-09-24 ENCOUNTER — LAB VISIT (OUTPATIENT)
Dept: LAB | Facility: HOSPITAL | Age: 70
End: 2020-09-24
Payer: MEDICARE

## 2020-09-24 DIAGNOSIS — D46.C MDS (MYELODYSPLASTIC SYNDROME) WITH 5Q DELETION: ICD-10-CM

## 2020-09-24 DIAGNOSIS — D46.9 MYELODYSPLASTIC SYNDROME: ICD-10-CM

## 2020-09-24 DIAGNOSIS — D63.0 ANEMIA IN NEOPLASTIC DISEASE: Primary | ICD-10-CM

## 2020-09-24 LAB
ABO + RH BLD: NORMAL
ANISOCYTOSIS BLD QL SMEAR: SLIGHT
BASOPHILS # BLD AUTO: 0.01 K/UL (ref 0–0.2)
BASOPHILS NFR BLD: 0.4 % (ref 0–1.9)
BLD GP AB SCN CELLS X3 SERPL QL: NORMAL
BLOOD GROUP ANTIBODIES SERPL: NORMAL
DIFFERENTIAL METHOD: ABNORMAL
EOSINOPHIL # BLD AUTO: 0.1 K/UL (ref 0–0.5)
EOSINOPHIL NFR BLD: 3.3 % (ref 0–8)
ERYTHROCYTE [DISTWIDTH] IN BLOOD BY AUTOMATED COUNT: 19.6 % (ref 11.5–14.5)
HCT VFR BLD AUTO: 20.6 % (ref 37–48.5)
HGB BLD-MCNC: 6.5 G/DL (ref 12–16)
IMM GRANULOCYTES # BLD AUTO: 0.01 K/UL (ref 0–0.04)
IMM GRANULOCYTES NFR BLD AUTO: 0.4 % (ref 0–0.5)
LYMPHOCYTES # BLD AUTO: 0.9 K/UL (ref 1–4.8)
LYMPHOCYTES NFR BLD: 36.2 % (ref 18–48)
MCH RBC QN AUTO: 30.8 PG (ref 27–31)
MCHC RBC AUTO-ENTMCNC: 31.6 G/DL (ref 32–36)
MCV RBC AUTO: 98 FL (ref 82–98)
MONOCYTES # BLD AUTO: 0.1 K/UL (ref 0.3–1)
MONOCYTES NFR BLD: 3.3 % (ref 4–15)
NEUTROPHILS # BLD AUTO: 1.4 K/UL (ref 1.8–7.7)
NEUTROPHILS NFR BLD: 56.4 % (ref 38–73)
NRBC BLD-RTO: 0 /100 WBC
OVALOCYTES BLD QL SMEAR: ABNORMAL
PLATELET # BLD AUTO: 156 K/UL (ref 150–350)
PLATELET BLD QL SMEAR: ABNORMAL
PMV BLD AUTO: 11.2 FL (ref 9.2–12.9)
POIKILOCYTOSIS BLD QL SMEAR: SLIGHT
RBC # BLD AUTO: 2.11 M/UL (ref 4–5.4)
WBC # BLD AUTO: 2.46 K/UL (ref 3.9–12.7)

## 2020-09-24 PROCEDURE — 36415 COLL VENOUS BLD VENIPUNCTURE: CPT | Mod: HCNC

## 2020-09-24 PROCEDURE — 86870 RBC ANTIBODY IDENTIFICATION: CPT | Mod: HCNC

## 2020-09-24 PROCEDURE — 85025 COMPLETE CBC W/AUTO DIFF WBC: CPT | Mod: HCNC

## 2020-09-24 PROCEDURE — 86922 COMPATIBILITY TEST ANTIGLOB: CPT | Mod: HCNC

## 2020-09-24 PROCEDURE — 86901 BLOOD TYPING SEROLOGIC RH(D): CPT | Mod: HCNC

## 2020-09-24 RX ORDER — HYDROCODONE BITARTRATE AND ACETAMINOPHEN 500; 5 MG/1; MG/1
TABLET ORAL ONCE
Status: CANCELLED | OUTPATIENT
Start: 2020-09-24 | End: 2020-09-24

## 2020-09-24 RX ORDER — DIPHENHYDRAMINE HCL 25 MG
25 CAPSULE ORAL
Status: CANCELLED | OUTPATIENT
Start: 2020-09-24

## 2020-09-24 RX ORDER — ACETAMINOPHEN 325 MG/1
650 TABLET ORAL
Status: CANCELLED | OUTPATIENT
Start: 2020-09-24

## 2020-09-25 ENCOUNTER — INFUSION (OUTPATIENT)
Dept: INFUSION THERAPY | Facility: HOSPITAL | Age: 70
End: 2020-09-25
Attending: INTERNAL MEDICINE
Payer: MEDICARE

## 2020-09-25 VITALS
HEART RATE: 74 BPM | RESPIRATION RATE: 18 BRPM | TEMPERATURE: 99 F | SYSTOLIC BLOOD PRESSURE: 164 MMHG | DIASTOLIC BLOOD PRESSURE: 70 MMHG

## 2020-09-25 DIAGNOSIS — D63.0 ANEMIA IN NEOPLASTIC DISEASE: Primary | ICD-10-CM

## 2020-09-25 DIAGNOSIS — D46.9 MDS (MYELODYSPLASTIC SYNDROME): ICD-10-CM

## 2020-09-25 PROCEDURE — A4216 STERILE WATER/SALINE, 10 ML: HCPCS | Mod: HCNC | Performed by: INTERNAL MEDICINE

## 2020-09-25 PROCEDURE — 25000003 PHARM REV CODE 250: Mod: HCNC | Performed by: NURSE PRACTITIONER

## 2020-09-25 PROCEDURE — 86902 BLOOD TYPE ANTIGEN DONOR EA: CPT | Mod: HCNC

## 2020-09-25 PROCEDURE — 63600175 PHARM REV CODE 636 W HCPCS: Mod: HCNC | Performed by: INTERNAL MEDICINE

## 2020-09-25 PROCEDURE — P9040 RBC LEUKOREDUCED IRRADIATED: HCPCS | Mod: HCNC

## 2020-09-25 PROCEDURE — 25000003 PHARM REV CODE 250: Mod: HCNC | Performed by: INTERNAL MEDICINE

## 2020-09-25 PROCEDURE — 36430 TRANSFUSION BLD/BLD COMPNT: CPT | Mod: HCNC

## 2020-09-25 RX ORDER — HYDROCODONE BITARTRATE AND ACETAMINOPHEN 500; 5 MG/1; MG/1
TABLET ORAL ONCE
Status: COMPLETED | OUTPATIENT
Start: 2020-09-25 | End: 2020-09-25

## 2020-09-25 RX ORDER — SODIUM CHLORIDE 0.9 % (FLUSH) 0.9 %
10 SYRINGE (ML) INJECTION
Status: CANCELLED | OUTPATIENT
Start: 2020-09-25

## 2020-09-25 RX ORDER — DIPHENHYDRAMINE HCL 25 MG
25 CAPSULE ORAL
Status: COMPLETED | OUTPATIENT
Start: 2020-09-25 | End: 2020-09-25

## 2020-09-25 RX ORDER — HEPARIN 100 UNIT/ML
500 SYRINGE INTRAVENOUS
Status: COMPLETED | OUTPATIENT
Start: 2020-09-25 | End: 2020-09-25

## 2020-09-25 RX ORDER — ACETAMINOPHEN 325 MG/1
650 TABLET ORAL
Status: COMPLETED | OUTPATIENT
Start: 2020-09-25 | End: 2020-09-25

## 2020-09-25 RX ORDER — HEPARIN 100 UNIT/ML
500 SYRINGE INTRAVENOUS
Status: CANCELLED | OUTPATIENT
Start: 2020-09-25

## 2020-09-25 RX ORDER — SODIUM CHLORIDE 0.9 % (FLUSH) 0.9 %
10 SYRINGE (ML) INJECTION
Status: COMPLETED | OUTPATIENT
Start: 2020-09-25 | End: 2020-09-25

## 2020-09-25 RX ADMIN — SODIUM CHLORIDE: 9 INJECTION, SOLUTION INTRAVENOUS at 01:09

## 2020-09-25 RX ADMIN — HEPARIN 500 UNITS: 100 SYRINGE at 04:09

## 2020-09-25 RX ADMIN — ACETAMINOPHEN 650 MG: 325 TABLET ORAL at 01:09

## 2020-09-25 RX ADMIN — DIPHENHYDRAMINE HYDROCHLORIDE 25 MG: 25 CAPSULE ORAL at 01:09

## 2020-09-25 RX ADMIN — Medication 10 ML: at 04:09

## 2020-09-25 NOTE — PLAN OF CARE
1606 patient completed and tolerated 1u RBCs, VSS. Pt voiced no new complaints or concerns at this time. NAD noted. Pt d/c home

## 2020-09-25 NOTE — PROGRESS NOTES
1 unit of PRBC ordered for Hgb 6.5 with pre medication tylenol and benadryl.     Kourtney Daly NP  Hematology/Oncology/BMT

## 2020-09-28 ENCOUNTER — LAB VISIT (OUTPATIENT)
Dept: LAB | Facility: HOSPITAL | Age: 70
End: 2020-09-28
Payer: MEDICARE

## 2020-09-28 DIAGNOSIS — D46.C MDS (MYELODYSPLASTIC SYNDROME) WITH 5Q DELETION: ICD-10-CM

## 2020-09-28 DIAGNOSIS — D46.9 MYELODYSPLASTIC SYNDROME: ICD-10-CM

## 2020-09-28 LAB
ABO + RH BLD: NORMAL
ALBUMIN SERPL BCP-MCNC: 3.6 G/DL (ref 3.5–5.2)
ALP SERPL-CCNC: 57 U/L (ref 55–135)
ALT SERPL W/O P-5'-P-CCNC: 54 U/L (ref 10–44)
ANION GAP SERPL CALC-SCNC: 10 MMOL/L (ref 8–16)
AST SERPL-CCNC: 28 U/L (ref 10–40)
BASOPHILS # BLD AUTO: 0.02 K/UL (ref 0–0.2)
BASOPHILS NFR BLD: 0.8 % (ref 0–1.9)
BILIRUB SERPL-MCNC: 0.4 MG/DL (ref 0.1–1)
BLD GP AB SCN CELLS X3 SERPL QL: NORMAL
BUN SERPL-MCNC: 19 MG/DL (ref 8–23)
CALCIUM SERPL-MCNC: 9.2 MG/DL (ref 8.7–10.5)
CHLORIDE SERPL-SCNC: 103 MMOL/L (ref 95–110)
CO2 SERPL-SCNC: 26 MMOL/L (ref 23–29)
CREAT SERPL-MCNC: 0.9 MG/DL (ref 0.5–1.4)
DIFFERENTIAL METHOD: ABNORMAL
EOSINOPHIL # BLD AUTO: 0.1 K/UL (ref 0–0.5)
EOSINOPHIL NFR BLD: 4.1 % (ref 0–8)
ERYTHROCYTE [DISTWIDTH] IN BLOOD BY AUTOMATED COUNT: 18.6 % (ref 11.5–14.5)
EST. GFR  (AFRICAN AMERICAN): >60 ML/MIN/1.73 M^2
EST. GFR  (NON AFRICAN AMERICAN): >60 ML/MIN/1.73 M^2
GLUCOSE SERPL-MCNC: 93 MG/DL (ref 70–110)
HCT VFR BLD AUTO: 24 % (ref 37–48.5)
HGB BLD-MCNC: 7.6 G/DL (ref 12–16)
IMM GRANULOCYTES # BLD AUTO: 0.04 K/UL (ref 0–0.04)
IMM GRANULOCYTES NFR BLD AUTO: 1.6 % (ref 0–0.5)
LYMPHOCYTES # BLD AUTO: 1 K/UL (ref 1–4.8)
LYMPHOCYTES NFR BLD: 38.8 % (ref 18–48)
MCH RBC QN AUTO: 30.9 PG (ref 27–31)
MCHC RBC AUTO-ENTMCNC: 31.7 G/DL (ref 32–36)
MCV RBC AUTO: 98 FL (ref 82–98)
MONOCYTES # BLD AUTO: 0.1 K/UL (ref 0.3–1)
MONOCYTES NFR BLD: 2.9 % (ref 4–15)
NEUTROPHILS # BLD AUTO: 1.3 K/UL (ref 1.8–7.7)
NEUTROPHILS NFR BLD: 51.8 % (ref 38–73)
NRBC BLD-RTO: 0 /100 WBC
PLATELET # BLD AUTO: 96 K/UL (ref 150–350)
PMV BLD AUTO: 11.3 FL (ref 9.2–12.9)
POTASSIUM SERPL-SCNC: 4.3 MMOL/L (ref 3.5–5.1)
PROT SERPL-MCNC: 6.9 G/DL (ref 6–8.4)
RBC # BLD AUTO: 2.46 M/UL (ref 4–5.4)
SODIUM SERPL-SCNC: 139 MMOL/L (ref 136–145)
WBC # BLD AUTO: 2.45 K/UL (ref 3.9–12.7)

## 2020-09-28 PROCEDURE — 36415 COLL VENOUS BLD VENIPUNCTURE: CPT | Mod: HCNC

## 2020-09-28 PROCEDURE — 80053 COMPREHEN METABOLIC PANEL: CPT | Mod: HCNC

## 2020-09-28 PROCEDURE — 85025 COMPLETE CBC W/AUTO DIFF WBC: CPT | Mod: HCNC

## 2020-09-28 PROCEDURE — 86901 BLOOD TYPING SEROLOGIC RH(D): CPT | Mod: HCNC

## 2020-09-29 ENCOUNTER — PATIENT MESSAGE (OUTPATIENT)
Dept: OTHER | Facility: OTHER | Age: 70
End: 2020-09-29

## 2020-10-01 ENCOUNTER — PATIENT MESSAGE (OUTPATIENT)
Dept: HEMATOLOGY/ONCOLOGY | Facility: CLINIC | Age: 70
End: 2020-10-01

## 2020-10-01 ENCOUNTER — LAB VISIT (OUTPATIENT)
Dept: LAB | Facility: HOSPITAL | Age: 70
End: 2020-10-01
Attending: INTERNAL MEDICINE
Payer: MEDICARE

## 2020-10-01 DIAGNOSIS — D46.9 MYELODYSPLASTIC SYNDROME: ICD-10-CM

## 2020-10-01 DIAGNOSIS — D46.C MDS (MYELODYSPLASTIC SYNDROME) WITH 5Q DELETION: ICD-10-CM

## 2020-10-01 LAB
ABO + RH BLD: NORMAL
BASOPHILS # BLD AUTO: 0.02 K/UL (ref 0–0.2)
BASOPHILS NFR BLD: 0.9 % (ref 0–1.9)
BLD GP AB SCN CELLS X3 SERPL QL: NORMAL
DIFFERENTIAL METHOD: ABNORMAL
EOSINOPHIL # BLD AUTO: 0.1 K/UL (ref 0–0.5)
EOSINOPHIL NFR BLD: 4.9 % (ref 0–8)
ERYTHROCYTE [DISTWIDTH] IN BLOOD BY AUTOMATED COUNT: 18.2 % (ref 11.5–14.5)
HCT VFR BLD AUTO: 22.9 % (ref 37–48.5)
HGB BLD-MCNC: 7.5 G/DL (ref 12–16)
IMM GRANULOCYTES # BLD AUTO: 0.05 K/UL (ref 0–0.04)
IMM GRANULOCYTES NFR BLD AUTO: 2.2 % (ref 0–0.5)
LYMPHOCYTES # BLD AUTO: 1 K/UL (ref 1–4.8)
LYMPHOCYTES NFR BLD: 42.7 % (ref 18–48)
MCH RBC QN AUTO: 31.3 PG (ref 27–31)
MCHC RBC AUTO-ENTMCNC: 32.8 G/DL (ref 32–36)
MCV RBC AUTO: 95 FL (ref 82–98)
MONOCYTES # BLD AUTO: 0 K/UL (ref 0.3–1)
MONOCYTES NFR BLD: 1.3 % (ref 4–15)
NEUTROPHILS # BLD AUTO: 1.1 K/UL (ref 1.8–7.7)
NEUTROPHILS NFR BLD: 48 % (ref 38–73)
NRBC BLD-RTO: 0 /100 WBC
PLATELET # BLD AUTO: 113 K/UL (ref 150–350)
PMV BLD AUTO: 11.6 FL (ref 9.2–12.9)
RBC # BLD AUTO: 2.4 M/UL (ref 4–5.4)
WBC # BLD AUTO: 2.25 K/UL (ref 3.9–12.7)

## 2020-10-01 PROCEDURE — 36415 COLL VENOUS BLD VENIPUNCTURE: CPT | Mod: HCNC

## 2020-10-01 PROCEDURE — 86850 RBC ANTIBODY SCREEN: CPT | Mod: HCNC

## 2020-10-01 PROCEDURE — 85025 COMPLETE CBC W/AUTO DIFF WBC: CPT | Mod: HCNC

## 2020-10-05 ENCOUNTER — PATIENT MESSAGE (OUTPATIENT)
Dept: ADMINISTRATIVE | Facility: HOSPITAL | Age: 70
End: 2020-10-05

## 2020-10-05 ENCOUNTER — LAB VISIT (OUTPATIENT)
Dept: LAB | Facility: HOSPITAL | Age: 70
End: 2020-10-05
Attending: INTERNAL MEDICINE
Payer: MEDICARE

## 2020-10-05 ENCOUNTER — TELEPHONE (OUTPATIENT)
Dept: HEMATOLOGY/ONCOLOGY | Facility: CLINIC | Age: 70
End: 2020-10-05

## 2020-10-05 DIAGNOSIS — D63.0 ANEMIA IN NEOPLASTIC DISEASE: Primary | ICD-10-CM

## 2020-10-05 DIAGNOSIS — D46.9 MYELODYSPLASTIC SYNDROME: ICD-10-CM

## 2020-10-05 DIAGNOSIS — D46.C MDS (MYELODYSPLASTIC SYNDROME) WITH 5Q DELETION: ICD-10-CM

## 2020-10-05 LAB
ABO + RH BLD: NORMAL
ANISOCYTOSIS BLD QL SMEAR: ABNORMAL
BASOPHILS # BLD AUTO: ABNORMAL K/UL (ref 0–0.2)
BASOPHILS NFR BLD: 2 % (ref 0–1.9)
BLD GP AB SCN CELLS X3 SERPL QL: NORMAL
DIFFERENTIAL METHOD: ABNORMAL
EOSINOPHIL # BLD AUTO: ABNORMAL K/UL (ref 0–0.5)
EOSINOPHIL NFR BLD: 7 % (ref 0–8)
ERYTHROCYTE [DISTWIDTH] IN BLOOD BY AUTOMATED COUNT: 18.2 % (ref 11.5–14.5)
HCT VFR BLD AUTO: 21.5 % (ref 37–48.5)
HGB BLD-MCNC: 7 G/DL (ref 12–16)
HYPOCHROMIA BLD QL SMEAR: ABNORMAL
IMM GRANULOCYTES # BLD AUTO: ABNORMAL K/UL (ref 0–0.04)
IMM GRANULOCYTES NFR BLD AUTO: ABNORMAL % (ref 0–0.5)
LYMPHOCYTES # BLD AUTO: ABNORMAL K/UL (ref 1–4.8)
LYMPHOCYTES NFR BLD: 51 % (ref 18–48)
MCH RBC QN AUTO: 31.5 PG (ref 27–31)
MCHC RBC AUTO-ENTMCNC: 32.6 G/DL (ref 32–36)
MCV RBC AUTO: 97 FL (ref 82–98)
MONOCYTES # BLD AUTO: ABNORMAL K/UL (ref 0.3–1)
MONOCYTES NFR BLD: 1 % (ref 4–15)
NEUTROPHILS # BLD AUTO: ABNORMAL K/UL (ref 1.8–7.7)
NEUTROPHILS NFR BLD: 39 % (ref 38–73)
NRBC BLD-RTO: 0 /100 WBC
PLATELET # BLD AUTO: 168 K/UL (ref 150–350)
PLATELET BLD QL SMEAR: ABNORMAL
PMV BLD AUTO: 11.5 FL (ref 9.2–12.9)
POLYCHROMASIA BLD QL SMEAR: ABNORMAL
RBC # BLD AUTO: 2.22 M/UL (ref 4–5.4)
WBC # BLD AUTO: 1.59 K/UL (ref 3.9–12.7)

## 2020-10-05 PROCEDURE — 36415 COLL VENOUS BLD VENIPUNCTURE: CPT | Mod: HCNC

## 2020-10-05 PROCEDURE — 86901 BLOOD TYPING SEROLOGIC RH(D): CPT | Mod: HCNC

## 2020-10-05 PROCEDURE — 85027 COMPLETE CBC AUTOMATED: CPT | Mod: HCNC

## 2020-10-05 PROCEDURE — 85007 BL SMEAR W/DIFF WBC COUNT: CPT | Mod: HCNC

## 2020-10-05 RX ORDER — DIPHENHYDRAMINE HCL 25 MG
25 CAPSULE ORAL
Status: CANCELLED | OUTPATIENT
Start: 2020-10-05

## 2020-10-05 RX ORDER — ACETAMINOPHEN 325 MG/1
650 TABLET ORAL
Status: CANCELLED | OUTPATIENT
Start: 2020-10-05

## 2020-10-05 RX ORDER — HYDROCODONE BITARTRATE AND ACETAMINOPHEN 500; 5 MG/1; MG/1
TABLET ORAL ONCE
Status: CANCELLED | OUTPATIENT
Start: 2020-10-05 | End: 2020-10-05

## 2020-10-06 ENCOUNTER — INFUSION (OUTPATIENT)
Dept: INFUSION THERAPY | Facility: HOSPITAL | Age: 70
End: 2020-10-06
Attending: INTERNAL MEDICINE
Payer: MEDICARE

## 2020-10-06 VITALS
DIASTOLIC BLOOD PRESSURE: 67 MMHG | HEART RATE: 65 BPM | RESPIRATION RATE: 18 BRPM | TEMPERATURE: 98 F | SYSTOLIC BLOOD PRESSURE: 151 MMHG

## 2020-10-06 DIAGNOSIS — D63.0 ANEMIA IN NEOPLASTIC DISEASE: ICD-10-CM

## 2020-10-06 DIAGNOSIS — D46.9 MYELODYSPLASTIC SYNDROME: ICD-10-CM

## 2020-10-06 DIAGNOSIS — K21.9 GASTROESOPHAGEAL REFLUX DISEASE WITHOUT ESOPHAGITIS: ICD-10-CM

## 2020-10-06 PROCEDURE — P9040 RBC LEUKOREDUCED IRRADIATED: HCPCS | Mod: HCNC

## 2020-10-06 PROCEDURE — 25000003 PHARM REV CODE 250: Mod: HCNC | Performed by: INTERNAL MEDICINE

## 2020-10-06 PROCEDURE — 86902 BLOOD TYPE ANTIGEN DONOR EA: CPT | Mod: HCNC

## 2020-10-06 PROCEDURE — 86922 COMPATIBILITY TEST ANTIGLOB: CPT | Mod: HCNC

## 2020-10-06 PROCEDURE — 36430 TRANSFUSION BLD/BLD COMPNT: CPT | Mod: HCNC

## 2020-10-06 RX ORDER — ACETAMINOPHEN 325 MG/1
650 TABLET ORAL
Status: COMPLETED | OUTPATIENT
Start: 2020-10-06 | End: 2020-10-06

## 2020-10-06 RX ORDER — DIPHENHYDRAMINE HCL 25 MG
25 CAPSULE ORAL
Status: COMPLETED | OUTPATIENT
Start: 2020-10-06 | End: 2020-10-06

## 2020-10-06 RX ORDER — HYDROCODONE BITARTRATE AND ACETAMINOPHEN 500; 5 MG/1; MG/1
TABLET ORAL ONCE
Status: COMPLETED | OUTPATIENT
Start: 2020-10-06 | End: 2020-10-06

## 2020-10-06 RX ORDER — PANTOPRAZOLE SODIUM 40 MG/1
40 TABLET, DELAYED RELEASE ORAL DAILY
Qty: 30 TABLET | Refills: 1 | Status: SHIPPED | OUTPATIENT
Start: 2020-10-06 | End: 2020-12-30 | Stop reason: SDUPTHER

## 2020-10-06 RX ADMIN — ACETAMINOPHEN 650 MG: 325 TABLET ORAL at 01:10

## 2020-10-06 RX ADMIN — DIPHENHYDRAMINE HYDROCHLORIDE 25 MG: 25 CAPSULE ORAL at 01:10

## 2020-10-06 RX ADMIN — SODIUM CHLORIDE: 0.9 INJECTION, SOLUTION INTRAVENOUS at 01:10

## 2020-10-06 NOTE — PLAN OF CARE
1430-Labs , hx, and medications reviewed. Assessment completed. Discussed plan of care with patient. Patient in agreement. Chair reclined and warm blanket and snack offered.

## 2020-10-06 NOTE — PLAN OF CARE
1600-Patient tolerated treatment well. Discharged without complaints or S/S of adverse event. AVS given.  Instructed to call provider for any questions or concerns.

## 2020-10-12 ENCOUNTER — INFUSION (OUTPATIENT)
Dept: INFUSION THERAPY | Facility: HOSPITAL | Age: 70
End: 2020-10-12
Attending: INTERNAL MEDICINE
Payer: MEDICARE

## 2020-10-12 ENCOUNTER — OFFICE VISIT (OUTPATIENT)
Dept: HEMATOLOGY/ONCOLOGY | Facility: CLINIC | Age: 70
End: 2020-10-12
Payer: MEDICARE

## 2020-10-12 VITALS
BODY MASS INDEX: 27.88 KG/M2 | DIASTOLIC BLOOD PRESSURE: 61 MMHG | HEART RATE: 89 BPM | TEMPERATURE: 98 F | SYSTOLIC BLOOD PRESSURE: 130 MMHG | WEIGHT: 173.5 LBS | HEIGHT: 66 IN | RESPIRATION RATE: 16 BRPM | OXYGEN SATURATION: 95 %

## 2020-10-12 VITALS
DIASTOLIC BLOOD PRESSURE: 67 MMHG | HEART RATE: 74 BPM | OXYGEN SATURATION: 97 % | SYSTOLIC BLOOD PRESSURE: 149 MMHG | RESPIRATION RATE: 18 BRPM | TEMPERATURE: 98 F

## 2020-10-12 DIAGNOSIS — E87.6 HYPOKALEMIA: ICD-10-CM

## 2020-10-12 DIAGNOSIS — D63.0 ANEMIA IN NEOPLASTIC DISEASE: ICD-10-CM

## 2020-10-12 DIAGNOSIS — D46.C MDS (MYELODYSPLASTIC SYNDROME) WITH 5Q DELETION: Primary | ICD-10-CM

## 2020-10-12 DIAGNOSIS — D46.9 MYELODYSPLASTIC SYNDROME: Primary | ICD-10-CM

## 2020-10-12 PROCEDURE — 1101F PT FALLS ASSESS-DOCD LE1/YR: CPT | Mod: HCNC,CPTII,S$GLB, | Performed by: INTERNAL MEDICINE

## 2020-10-12 PROCEDURE — 1101F PR PT FALLS ASSESS DOC 0-1 FALLS W/OUT INJ PAST YR: ICD-10-PCS | Mod: HCNC,CPTII,S$GLB, | Performed by: INTERNAL MEDICINE

## 2020-10-12 PROCEDURE — 96401 CHEMO ANTI-NEOPL SQ/IM: CPT | Mod: HCNC

## 2020-10-12 PROCEDURE — 63600175 PHARM REV CODE 636 W HCPCS: Mod: JG,HCNC | Performed by: INTERNAL MEDICINE

## 2020-10-12 PROCEDURE — 1125F AMNT PAIN NOTED PAIN PRSNT: CPT | Mod: HCNC,S$GLB,, | Performed by: INTERNAL MEDICINE

## 2020-10-12 PROCEDURE — 3078F DIAST BP <80 MM HG: CPT | Mod: HCNC,CPTII,S$GLB, | Performed by: INTERNAL MEDICINE

## 2020-10-12 PROCEDURE — 3075F SYST BP GE 130 - 139MM HG: CPT | Mod: HCNC,CPTII,S$GLB, | Performed by: INTERNAL MEDICINE

## 2020-10-12 PROCEDURE — 3078F PR MOST RECENT DIASTOLIC BLOOD PRESSURE < 80 MM HG: ICD-10-PCS | Mod: HCNC,CPTII,S$GLB, | Performed by: INTERNAL MEDICINE

## 2020-10-12 PROCEDURE — 3008F PR BODY MASS INDEX (BMI) DOCUMENTED: ICD-10-PCS | Mod: HCNC,CPTII,S$GLB, | Performed by: INTERNAL MEDICINE

## 2020-10-12 PROCEDURE — 99215 OFFICE O/P EST HI 40 MIN: CPT | Mod: HCNC,S$GLB,, | Performed by: INTERNAL MEDICINE

## 2020-10-12 PROCEDURE — 3075F PR MOST RECENT SYSTOLIC BLOOD PRESS GE 130-139MM HG: ICD-10-PCS | Mod: HCNC,CPTII,S$GLB, | Performed by: INTERNAL MEDICINE

## 2020-10-12 PROCEDURE — 1159F PR MEDICATION LIST DOCUMENTED IN MEDICAL RECORD: ICD-10-PCS | Mod: HCNC,S$GLB,, | Performed by: INTERNAL MEDICINE

## 2020-10-12 PROCEDURE — 1125F PR PAIN SEVERITY QUANTIFIED, PAIN PRESENT: ICD-10-PCS | Mod: HCNC,S$GLB,, | Performed by: INTERNAL MEDICINE

## 2020-10-12 PROCEDURE — 99999 PR PBB SHADOW E&M-EST. PATIENT-LVL V: ICD-10-PCS | Mod: PBBFAC,HCNC,, | Performed by: INTERNAL MEDICINE

## 2020-10-12 PROCEDURE — 99215 PR OFFICE/OUTPT VISIT, EST, LEVL V, 40-54 MIN: ICD-10-PCS | Mod: HCNC,S$GLB,, | Performed by: INTERNAL MEDICINE

## 2020-10-12 PROCEDURE — 3008F BODY MASS INDEX DOCD: CPT | Mod: HCNC,CPTII,S$GLB, | Performed by: INTERNAL MEDICINE

## 2020-10-12 PROCEDURE — 1159F MED LIST DOCD IN RCRD: CPT | Mod: HCNC,S$GLB,, | Performed by: INTERNAL MEDICINE

## 2020-10-12 PROCEDURE — 99999 PR PBB SHADOW E&M-EST. PATIENT-LVL V: CPT | Mod: PBBFAC,HCNC,, | Performed by: INTERNAL MEDICINE

## 2020-10-12 RX ORDER — ONDANSETRON 4 MG/1
8 TABLET, ORALLY DISINTEGRATING ORAL ONCE
Status: CANCELLED | OUTPATIENT
Start: 2020-10-14

## 2020-10-12 RX ORDER — AZACITIDINE 100 MG/1
75 INJECTION, POWDER, LYOPHILIZED, FOR SOLUTION INTRAVENOUS; SUBCUTANEOUS
Status: CANCELLED | OUTPATIENT
Start: 2020-10-16

## 2020-10-12 RX ORDER — ONDANSETRON 4 MG/1
8 TABLET, ORALLY DISINTEGRATING ORAL ONCE
Status: DISCONTINUED | OUTPATIENT
Start: 2020-10-12 | End: 2020-10-12 | Stop reason: HOSPADM

## 2020-10-12 RX ORDER — ONDANSETRON 4 MG/1
8 TABLET, ORALLY DISINTEGRATING ORAL ONCE
Status: CANCELLED | OUTPATIENT
Start: 2020-10-16

## 2020-10-12 RX ORDER — ONDANSETRON 4 MG/1
8 TABLET, ORALLY DISINTEGRATING ORAL ONCE
Status: CANCELLED | OUTPATIENT
Start: 2020-10-12

## 2020-10-12 RX ORDER — AZACITIDINE 100 MG/1
75 INJECTION, POWDER, LYOPHILIZED, FOR SOLUTION INTRAVENOUS; SUBCUTANEOUS
Status: CANCELLED | OUTPATIENT
Start: 2020-10-15

## 2020-10-12 RX ORDER — AZACITIDINE 100 MG/1
75 INJECTION, POWDER, LYOPHILIZED, FOR SOLUTION INTRAVENOUS; SUBCUTANEOUS
Status: COMPLETED | OUTPATIENT
Start: 2020-10-12 | End: 2020-10-12

## 2020-10-12 RX ORDER — AZACITIDINE 100 MG/1
75 INJECTION, POWDER, LYOPHILIZED, FOR SOLUTION INTRAVENOUS; SUBCUTANEOUS
Status: CANCELLED | OUTPATIENT
Start: 2020-10-12

## 2020-10-12 RX ORDER — AZACITIDINE 100 MG/1
75 INJECTION, POWDER, LYOPHILIZED, FOR SOLUTION INTRAVENOUS; SUBCUTANEOUS
Status: CANCELLED | OUTPATIENT
Start: 2020-10-13

## 2020-10-12 RX ORDER — ONDANSETRON 4 MG/1
8 TABLET, ORALLY DISINTEGRATING ORAL ONCE
Status: CANCELLED | OUTPATIENT
Start: 2020-10-13

## 2020-10-12 RX ORDER — ONDANSETRON 4 MG/1
8 TABLET, ORALLY DISINTEGRATING ORAL ONCE
Status: CANCELLED | OUTPATIENT
Start: 2020-10-15

## 2020-10-12 RX ORDER — AZACITIDINE 100 MG/1
75 INJECTION, POWDER, LYOPHILIZED, FOR SOLUTION INTRAVENOUS; SUBCUTANEOUS
Status: CANCELLED | OUTPATIENT
Start: 2020-10-14

## 2020-10-12 RX ADMIN — AZACITIDINE 150 MG: 100 INJECTION, POWDER, LYOPHILIZED, FOR SOLUTION INTRAVENOUS; SUBCUTANEOUS at 01:10

## 2020-10-12 NOTE — NURSING
1345 pt arrived for vidaza injections. Pt tolerating well. Pt ambulatory to injection chair independently. Reports occasional bowel upset though managed with PRN medications. Pt reports fatigue and inability to fall asleep at nights at times. VSS. Reviewed meds, hx, axs, labs, tx plan in detail. Vidaza administered sub q to RLA, X3 injections, tolerated well, band aids applied to site. Pt ambulatory out of injection suite independently. To return tomorrow for D2.

## 2020-10-12 NOTE — Clinical Note
- Please schedule CBC and type and screen twice weekly on Mondays and Thursday for possible transfusions on Tuesday& Fridays through months of October and November  - Schedule return visit with CBC, type and screen, CMP and appt with Dr. Valdes or NP 11/9  - Schedule chemo chair for cycle 19 vidaza 11/9-11/13

## 2020-10-12 NOTE — PROGRESS NOTES
Subjective:       Patient ID: Susan Puente is a 70 y.o. female.    Chief Complaint: LABS AND FOLLOW UP and Joint Pain    HPI: Patient presents today alone for follow up of her history of MDS 5 q del syndrome prior to cycle 19  Vidaza as third line therapy for 5q minus syndrome. She continued to have disease per BM bx 10/2019. Tolerated previous therapies well. Requires transfusions at times. On antimicrobials for neutropenia. Discussed potential signs of depression vs isolation today. She wants to get back to [employer], but has not made this move yet. Notes she will go up to a week without bathing. She has mail stacked up that she needs to sort, but keep procrastinating. She was seen by Dr. Murrieta in the past, her budget does not currently include funds to copays for these visits. She denies fevers, chills, sob, chest pain, n/v/c. Increased physical activity at home, delayed fitness center appointments due to pandemic situation. Weight is stable.    Oncology History   Ms. Puente is a 68 year old female with hx of DM2, peripheral vascular disease, tobacco use, CAD, hyperlipidemia, hypertension who was hospitalized 11/10/16 for anemia. hgb 5.3  MCH 43.4 with normal WBC and platelets. Patient had normal iron stores. She was transfused 3 units of PRBCs and discharged home with hgb 8.7 on 11/11/16. She was referred for further evalutation of her anemia. On 12/16/16 patient had a normal SPEP and immunofixation. Slightly elevated kappa light chains with normal ration. Elevated vitamin b12, normal folate, JAYDEN was positive with a low titer and negative profile. Patient had a bone marrow biopsy 1/5/16 which showed the core biopsy is normocellular for age (40%); however, megakaryocytes are increased and show frequent small, hypolobated forms. Additionally, a subset of the neutrophils are hypogranular. Blasts are not increased by either morphology (1.2%) or in the corresponding flow cytometric analysis.  Fish detects a 5q deletion in 54.5% of nuclei. Cytogenetics reported 20 metaphases, 2 metaphases were normal and 18 metaphases had a 5q deletion. No additional cytogenetic abnormalities were detected. Findings consistent with 5q deletion syndrome.     Patient had a delay in obtaining Revlimid 10 mg daily but did start taking the medication 2/8/17. On 2/9/17 patient developed a diffuse maculopapular rash throughout scalp arms, legs and torso. She states she had no stridor or wheezing. She discontinued medication 2/15/17. Went to allergist who provided references on desensitization so that patient could resume Revlimid. Unfortunately developed pancytopenia and Revlimid stopped 6/16/17. She had a repeat BMBX  performed 6/8/17 and showed a hypercellular marrow (60%) continued atypia in the granulocytes and megakaryocytes were noted.  Additionally, there is erythroid atypia present. No increase in blasts. Cytogenetics are normal and MDS FISH is negative, failing to show 5 q minus. NGS should no significant molecular mutations.  Anemia work up revealed bienvenido negative hemolysis.    Revlimid has been stopped since June 2017 after she developed pancytopenia while on Revlimid (required desensitization). CBC was normal for almost 1 year and marrow was negative for del5q. Bone marrow biopsy repeat from 6/2018 showed relapsed 5q minus MDS without new or additional mutations. Revlimid restarted at 2.5 mg daily with prednisone to prevent allergic reaction. Titrated to a goal of 10mg daily Revlimid. Hospital admission 3/12-3/17/19 for unresponsive event after blood transfusion, found to be profoundly pancytopenic at admission. Since hospital admission Revlimid has been stopped. Repeat marrow March 2019 shows persistent MDS with 5q minus.     Started cycle 1 Vidaza 5/6/19 subq for 5 days every 28 days. Restaging bone marrow biopsy from 10/24/19 shows persistent MDS, no excess blasts and 3 of 20 metaphases with 5q  deletion.    Review of Systems   Constitutional: Negative for fever, chills, weight loss, fatigue.   HENT: Negative for sinus congestion or rhinorrhea. Negative for ear pain, mouth sores, nosebleeds and trouble swallowing.    Respiratory: Negative for cough, shortness of breath and wheezing.    Cardiovascular: Negative for chest pain and leg swelling.   Gastrointestinal: Negative for abdominal distention, abdominal pain, blood in stool, nausea and vomiting.   Endocrine: Negative for polyphagia and polyuria.   Genitourinary: Negative for dysuria, hematuria and urgency.   Musculoskeletal: Positive for sciatic pain much improved with gabapentin. Right shoulder pain and limited ROM.   Skin: Negative for color change, pallor and rash.   Neurological: Negative for dizziness, weakness, light-headedness and headaches.   Hematological: Negative for adenopathy. Does not bruise/bleed easily.   Psychiatric/Behavioral: Negative for agitation and behavioral problems.       Objective:      Physical Exam   Constitutional: She is oriented to person, place, and time. She appears well-developed and well-nourished.   Mouth/Throat: Oropharynx is clear and moist. No oropharyngeal exudate.   Eyes: Conjunctivae and EOM are normal. Right eye exhibits no discharge. Left eye exhibits no discharge.   Neck: Normal range of motion. Neck supple.   Cardiovascular: Normal rate, regular rhythm, normal heart sounds and intact distal pulses.   No murmur heard.  Pulmonary/Chest: Effort normal and breath sounds normal. No respiratory distress. She has no wheezes.   Abdominal: Soft. Bowel sounds are normal. She exhibits no distension and no mass. There is no tenderness.   Musculoskeletal: Normal range of motion. She exhibits no edema.   Neurological: She is alert and oriented to person, place, and time.   Skin: Skin is warm and dry. No rash noted.   Psychiatric: She has a normal mood and affect. Her behavior is normal. Judgment and thought content  normal.   Nursing note and vitals reviewed.      Assessment:       No diagnosis found.    Plan:     MDS  - Initially with 5 q minus syndrome and treated with Revlimid. Developed allergy and was then desensitized. Soon after developed pancytopenia and Revlimid held since June 2017.    - BMBX on 6/8/17 was with normal cytogenetics. 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 Discussed treatment options of HMA therapy or repeat trial of Revlimid and patient wished to proceed with Revlimid   - Revlimid stopped again due to repeat allergic reaction and pancytopenia 3/2019  - Started cycle 1 Vidaza 5/6/19 subq for 5 days every 28 days  - Restaging bone marrow biopsy following cycle 5 from 10/24/19 shows persistent MDS, no excess blasts and 3 of 20 metaphases with 5q deletion  - Tolerated cycles 1-18 well thus far. Package insert for Vidaza recommends holding if ANC <1000, however pt has been receiving this with an ANC below 1000 for each cycle. Okay to continue to give despite neutropenia. Will proceed with C19 today  -Desired repeat bone marrow biopsy Spring 2020, unable to perform with sedation due to COVID19- unless change is CBC warrants earlier procedure, patient is waiting for procedure with sedation  - CBC is stable; platelets actually normalized. Continue to treat with Vidaza    Cytopenias 2/2 disease: anemia and leukopenia  - Transfuse for hgb < 7 or plts < 10K  - Continue ppx cipro, acyclovir, and fluconazole  - Transfuse 8/17/2020 for hemoglobin of 7    DM2  - management per PCP  - continue metformin    HTN  - management per PCP  - continue lisinopril-HCTZ    CAD  - continue Repatha for HLD per PCP (intolerant to statins)  - Not taking ASA now    Adjustment disorder  - Improved mood on Celexa. Continue    Myalgias/possible sciatica  - started trial of gabapentin 100 mg bid 11/4/19, much improved; now only taking gabapentin once a day in AM  - patient to return to [facility]. Pending decision  due to covid    Insonmia  - using melatonin OTC    Mood  - well aware of behaviors out of the ordinary  - discussed setting small goals for each week, first being getting to mail  - continue motivation to get back to exercise program    Follow up:  - Please schedule CBC and type and screen twice weekly on Mondays and Thursday for possible transfusions on Tuesday& Fridays through months of October and November  - Schedule return visit with CBC, type and screen, CMP and appt with Dr. Valdes or NP 11/9  - Schedule chemo chair for cycle 19 vidaza 11/9-11/13

## 2020-10-13 ENCOUNTER — INFUSION (OUTPATIENT)
Dept: INFUSION THERAPY | Facility: HOSPITAL | Age: 70
End: 2020-10-13
Attending: INTERNAL MEDICINE
Payer: MEDICARE

## 2020-10-13 VITALS
DIASTOLIC BLOOD PRESSURE: 58 MMHG | TEMPERATURE: 99 F | HEART RATE: 81 BPM | RESPIRATION RATE: 18 BRPM | SYSTOLIC BLOOD PRESSURE: 124 MMHG

## 2020-10-13 DIAGNOSIS — D46.C MDS (MYELODYSPLASTIC SYNDROME) WITH 5Q DELETION: Primary | ICD-10-CM

## 2020-10-13 PROCEDURE — 63600175 PHARM REV CODE 636 W HCPCS: Mod: JG,HCNC | Performed by: INTERNAL MEDICINE

## 2020-10-13 PROCEDURE — 96401 CHEMO ANTI-NEOPL SQ/IM: CPT | Mod: HCNC

## 2020-10-13 RX ORDER — AZACITIDINE 100 MG/1
75 INJECTION, POWDER, LYOPHILIZED, FOR SOLUTION INTRAVENOUS; SUBCUTANEOUS
Status: COMPLETED | OUTPATIENT
Start: 2020-10-13 | End: 2020-10-13

## 2020-10-13 RX ADMIN — AZACITIDINE 150 MG: 100 INJECTION, POWDER, LYOPHILIZED, FOR SOLUTION INTRAVENOUS; SUBCUTANEOUS at 11:10

## 2020-10-14 ENCOUNTER — INFUSION (OUTPATIENT)
Dept: INFUSION THERAPY | Facility: HOSPITAL | Age: 70
End: 2020-10-14
Attending: INTERNAL MEDICINE
Payer: MEDICARE

## 2020-10-14 VITALS
TEMPERATURE: 99 F | HEART RATE: 84 BPM | SYSTOLIC BLOOD PRESSURE: 125 MMHG | DIASTOLIC BLOOD PRESSURE: 59 MMHG | RESPIRATION RATE: 18 BRPM

## 2020-10-14 DIAGNOSIS — D46.C MDS (MYELODYSPLASTIC SYNDROME) WITH 5Q DELETION: Primary | ICD-10-CM

## 2020-10-14 DIAGNOSIS — E87.6 HYPOKALEMIA: ICD-10-CM

## 2020-10-14 PROCEDURE — 96401 CHEMO ANTI-NEOPL SQ/IM: CPT | Mod: HCNC

## 2020-10-14 PROCEDURE — 63600175 PHARM REV CODE 636 W HCPCS: Mod: JG,HCNC | Performed by: INTERNAL MEDICINE

## 2020-10-14 RX ORDER — AZACITIDINE 100 MG/1
75 INJECTION, POWDER, LYOPHILIZED, FOR SOLUTION INTRAVENOUS; SUBCUTANEOUS
Status: COMPLETED | OUTPATIENT
Start: 2020-10-14 | End: 2020-10-14

## 2020-10-14 RX ORDER — ONDANSETRON 4 MG/1
8 TABLET, ORALLY DISINTEGRATING ORAL ONCE
Status: DISCONTINUED | OUTPATIENT
Start: 2020-10-14 | End: 2020-10-14 | Stop reason: HOSPADM

## 2020-10-14 RX ADMIN — AZACITIDINE 150 MG: 100 INJECTION, POWDER, LYOPHILIZED, FOR SOLUTION INTRAVENOUS; SUBCUTANEOUS at 12:10

## 2020-10-14 NOTE — NURSING
Patient here for vidaza injections-given in 3 injections-complains of bone pain today-tolerated well.

## 2020-10-15 ENCOUNTER — INFUSION (OUTPATIENT)
Dept: INFUSION THERAPY | Facility: HOSPITAL | Age: 70
End: 2020-10-15
Attending: INTERNAL MEDICINE
Payer: MEDICARE

## 2020-10-15 ENCOUNTER — PATIENT MESSAGE (OUTPATIENT)
Dept: HEMATOLOGY/ONCOLOGY | Facility: CLINIC | Age: 70
End: 2020-10-15

## 2020-10-15 VITALS
DIASTOLIC BLOOD PRESSURE: 72 MMHG | RESPIRATION RATE: 18 BRPM | SYSTOLIC BLOOD PRESSURE: 162 MMHG | TEMPERATURE: 98 F | HEART RATE: 89 BPM

## 2020-10-15 DIAGNOSIS — D46.C MDS (MYELODYSPLASTIC SYNDROME) WITH 5Q DELETION: Primary | ICD-10-CM

## 2020-10-15 DIAGNOSIS — E87.6 HYPOKALEMIA: ICD-10-CM

## 2020-10-15 PROCEDURE — 63600175 PHARM REV CODE 636 W HCPCS: Mod: JG,HCNC | Performed by: INTERNAL MEDICINE

## 2020-10-15 PROCEDURE — 96401 CHEMO ANTI-NEOPL SQ/IM: CPT | Mod: HCNC

## 2020-10-15 RX ORDER — AZACITIDINE 100 MG/1
75 INJECTION, POWDER, LYOPHILIZED, FOR SOLUTION INTRAVENOUS; SUBCUTANEOUS
Status: COMPLETED | OUTPATIENT
Start: 2020-10-15 | End: 2020-10-15

## 2020-10-15 RX ORDER — ONDANSETRON 4 MG/1
8 TABLET, ORALLY DISINTEGRATING ORAL ONCE
Status: DISCONTINUED | OUTPATIENT
Start: 2020-10-15 | End: 2020-10-15 | Stop reason: HOSPADM

## 2020-10-15 RX ADMIN — AZACITIDINE 150 MG: 100 INJECTION, POWDER, LYOPHILIZED, FOR SOLUTION INTRAVENOUS; SUBCUTANEOUS at 11:10

## 2020-10-15 NOTE — NURSING
Pt arrived for vidaza D4.  Pt tolerated injections SQ x3 to RLA.  Discharged to home using her rolling walker.

## 2020-10-16 ENCOUNTER — INFUSION (OUTPATIENT)
Dept: INFUSION THERAPY | Facility: HOSPITAL | Age: 70
End: 2020-10-16
Payer: MEDICARE

## 2020-10-16 VITALS
TEMPERATURE: 98 F | SYSTOLIC BLOOD PRESSURE: 145 MMHG | DIASTOLIC BLOOD PRESSURE: 66 MMHG | RESPIRATION RATE: 18 BRPM | HEART RATE: 97 BPM

## 2020-10-16 DIAGNOSIS — D46.C MDS (MYELODYSPLASTIC SYNDROME) WITH 5Q DELETION: Primary | ICD-10-CM

## 2020-10-16 DIAGNOSIS — E87.6 HYPOKALEMIA: ICD-10-CM

## 2020-10-16 PROCEDURE — 63600175 PHARM REV CODE 636 W HCPCS: Mod: JG,HCNC | Performed by: INTERNAL MEDICINE

## 2020-10-16 PROCEDURE — 96401 CHEMO ANTI-NEOPL SQ/IM: CPT | Mod: HCNC

## 2020-10-16 RX ORDER — AZACITIDINE 100 MG/1
75 INJECTION, POWDER, LYOPHILIZED, FOR SOLUTION INTRAVENOUS; SUBCUTANEOUS
Status: COMPLETED | OUTPATIENT
Start: 2020-10-16 | End: 2020-10-16

## 2020-10-16 RX ORDER — ONDANSETRON 4 MG/1
8 TABLET, ORALLY DISINTEGRATING ORAL ONCE
Status: DISCONTINUED | OUTPATIENT
Start: 2020-10-16 | End: 2020-10-16 | Stop reason: HOSPADM

## 2020-10-16 RX ADMIN — AZACITIDINE 150 MG: 100 INJECTION, POWDER, LYOPHILIZED, FOR SOLUTION INTRAVENOUS; SUBCUTANEOUS at 11:10

## 2020-10-16 NOTE — NURSING
1200 pt completed injections to abd, no new complaints or concerns voiced at this time/ NAD noted, pt d/c home.

## 2020-10-19 ENCOUNTER — LAB VISIT (OUTPATIENT)
Dept: LAB | Facility: HOSPITAL | Age: 70
End: 2020-10-19
Payer: MEDICARE

## 2020-10-19 DIAGNOSIS — D46.C MDS (MYELODYSPLASTIC SYNDROME) WITH 5Q DELETION: ICD-10-CM

## 2020-10-19 DIAGNOSIS — D46.9 MYELODYSPLASTIC SYNDROME: ICD-10-CM

## 2020-10-19 LAB
ABO + RH BLD: NORMAL
ANISOCYTOSIS BLD QL SMEAR: SLIGHT
BASOPHILS # BLD AUTO: 0.03 K/UL (ref 0–0.2)
BASOPHILS NFR BLD: 0.9 % (ref 0–1.9)
BLD GP AB SCN CELLS X3 SERPL QL: NORMAL
DIFFERENTIAL METHOD: ABNORMAL
EOSINOPHIL # BLD AUTO: 0.2 K/UL (ref 0–0.5)
EOSINOPHIL NFR BLD: 5 % (ref 0–8)
ERYTHROCYTE [DISTWIDTH] IN BLOOD BY AUTOMATED COUNT: 19.8 % (ref 11.5–14.5)
HCT VFR BLD AUTO: 22.1 % (ref 37–48.5)
HGB BLD-MCNC: 7 G/DL (ref 12–16)
IMM GRANULOCYTES # BLD AUTO: 0.03 K/UL (ref 0–0.04)
IMM GRANULOCYTES NFR BLD AUTO: 0.9 % (ref 0–0.5)
LYMPHOCYTES # BLD AUTO: 1.2 K/UL (ref 1–4.8)
LYMPHOCYTES NFR BLD: 36.6 % (ref 18–48)
MCH RBC QN AUTO: 30.6 PG (ref 27–31)
MCHC RBC AUTO-ENTMCNC: 31.7 G/DL (ref 32–36)
MCV RBC AUTO: 97 FL (ref 82–98)
MONOCYTES # BLD AUTO: 0.1 K/UL (ref 0.3–1)
MONOCYTES NFR BLD: 3.5 % (ref 4–15)
NEUTROPHILS # BLD AUTO: 1.7 K/UL (ref 1.8–7.7)
NEUTROPHILS NFR BLD: 53.1 % (ref 38–73)
NRBC BLD-RTO: 0 /100 WBC
OVALOCYTES BLD QL SMEAR: ABNORMAL
PLATELET # BLD AUTO: 201 K/UL (ref 150–350)
PLATELET BLD QL SMEAR: ABNORMAL
PMV BLD AUTO: 11.2 FL (ref 9.2–12.9)
POIKILOCYTOSIS BLD QL SMEAR: SLIGHT
RBC # BLD AUTO: 2.29 M/UL (ref 4–5.4)
WBC # BLD AUTO: 3.17 K/UL (ref 3.9–12.7)

## 2020-10-19 PROCEDURE — 85025 COMPLETE CBC W/AUTO DIFF WBC: CPT | Mod: HCNC

## 2020-10-19 PROCEDURE — 86901 BLOOD TYPING SEROLOGIC RH(D): CPT | Mod: HCNC

## 2020-10-19 PROCEDURE — 36415 COLL VENOUS BLD VENIPUNCTURE: CPT | Mod: HCNC

## 2020-10-20 ENCOUNTER — PATIENT MESSAGE (OUTPATIENT)
Dept: HEMATOLOGY/ONCOLOGY | Facility: CLINIC | Age: 70
End: 2020-10-20

## 2020-10-20 DIAGNOSIS — D64.9 ANEMIA REQUIRING TRANSFUSIONS: Primary | ICD-10-CM

## 2020-10-20 RX ORDER — HYDROCODONE BITARTRATE AND ACETAMINOPHEN 500; 5 MG/1; MG/1
TABLET ORAL ONCE
Status: CANCELLED | OUTPATIENT
Start: 2020-10-20 | End: 2020-10-20

## 2020-10-20 RX ORDER — ACETAMINOPHEN 325 MG/1
650 TABLET ORAL
Status: CANCELLED | OUTPATIENT
Start: 2020-10-20

## 2020-10-20 NOTE — PROGRESS NOTES
Lab Results   Component Value Date    WBC 3.17 (L) 10/19/2020    HGB 7.0 (L) 10/19/2020    HCT 22.1 (L) 10/19/2020    MCV 97 10/19/2020     10/19/2020       1 unit prbcs ordered.    Bernadette Ruiz, DNP, NP  Hematology/Oncology

## 2020-10-21 ENCOUNTER — INFUSION (OUTPATIENT)
Dept: INFUSION THERAPY | Facility: HOSPITAL | Age: 70
End: 2020-10-21
Payer: MEDICARE

## 2020-10-21 ENCOUNTER — PATIENT OUTREACH (OUTPATIENT)
Dept: ADMINISTRATIVE | Facility: OTHER | Age: 70
End: 2020-10-21

## 2020-10-21 VITALS
SYSTOLIC BLOOD PRESSURE: 132 MMHG | OXYGEN SATURATION: 99 % | DIASTOLIC BLOOD PRESSURE: 70 MMHG | RESPIRATION RATE: 18 BRPM | TEMPERATURE: 98 F | HEART RATE: 72 BPM

## 2020-10-21 DIAGNOSIS — E87.6 HYPOKALEMIA: ICD-10-CM

## 2020-10-21 DIAGNOSIS — Z51.11 ENCOUNTER FOR ANTINEOPLASTIC CHEMOTHERAPY: Primary | ICD-10-CM

## 2020-10-21 DIAGNOSIS — D64.9 ANEMIA REQUIRING TRANSFUSIONS: ICD-10-CM

## 2020-10-21 PROCEDURE — 25000003 PHARM REV CODE 250: Mod: HCNC | Performed by: INTERNAL MEDICINE

## 2020-10-21 PROCEDURE — 25000003 PHARM REV CODE 250: Mod: HCNC | Performed by: NURSE PRACTITIONER

## 2020-10-21 PROCEDURE — 63600175 PHARM REV CODE 636 W HCPCS: Mod: HCNC | Performed by: INTERNAL MEDICINE

## 2020-10-21 PROCEDURE — 36430 TRANSFUSION BLD/BLD COMPNT: CPT | Mod: HCNC

## 2020-10-21 RX ORDER — ACETAMINOPHEN 325 MG/1
650 TABLET ORAL
Status: DISCONTINUED | OUTPATIENT
Start: 2020-10-21 | End: 2020-10-21 | Stop reason: HOSPADM

## 2020-10-21 RX ORDER — ACETAMINOPHEN 325 MG/1
650 TABLET ORAL
Status: COMPLETED | OUTPATIENT
Start: 2020-10-21 | End: 2020-10-21

## 2020-10-21 RX ORDER — HYDROCODONE BITARTRATE AND ACETAMINOPHEN 500; 5 MG/1; MG/1
TABLET ORAL ONCE
Status: COMPLETED | OUTPATIENT
Start: 2020-10-21 | End: 2020-10-21

## 2020-10-21 RX ORDER — POTASSIUM CHLORIDE 20 MEQ/1
20 TABLET, EXTENDED RELEASE ORAL DAILY
Qty: 30 TABLET | Refills: 4 | Status: SHIPPED | OUTPATIENT
Start: 2020-10-21 | End: 2021-06-15 | Stop reason: SDUPTHER

## 2020-10-21 RX ORDER — HEPARIN 100 UNIT/ML
500 SYRINGE INTRAVENOUS
Status: COMPLETED | OUTPATIENT
Start: 2020-10-21 | End: 2020-10-21

## 2020-10-21 RX ORDER — DIPHENHYDRAMINE HCL 25 MG
25 CAPSULE ORAL
Status: COMPLETED | OUTPATIENT
Start: 2020-10-21 | End: 2020-10-21

## 2020-10-21 RX ADMIN — HEPARIN 500 UNITS: 100 SYRINGE at 05:10

## 2020-10-21 RX ADMIN — DIPHENHYDRAMINE HYDROCHLORIDE 25 MG: 25 CAPSULE ORAL at 03:10

## 2020-10-21 RX ADMIN — ACETAMINOPHEN 650 MG: 325 TABLET ORAL at 03:10

## 2020-10-21 RX ADMIN — SODIUM CHLORIDE: 9 INJECTION, SOLUTION INTRAVENOUS at 03:10

## 2020-10-21 NOTE — PLAN OF CARE
Pt admitted for 1 unit PRBC. Labs reviewed . Pt tolerated transfusion well. Discharged @ 17:20 , given AVS.

## 2020-10-21 NOTE — PROGRESS NOTES
Requested updates within Care Everywhere.  Patient's chart was reviewed for overdue GIANA topics.  Media reviewed for outside eye exam and colonoscopy.  Immunizations reconciled.    Orders placed:n/a  Tasked appts:n/a  Labs Linked:n/a

## 2020-10-22 ENCOUNTER — HOSPITAL ENCOUNTER (OUTPATIENT)
Dept: CARDIOLOGY | Facility: CLINIC | Age: 70
Discharge: HOME | End: 2020-10-22
Payer: MEDICARE

## 2020-10-22 ENCOUNTER — PATIENT MESSAGE (OUTPATIENT)
Dept: CARDIOLOGY | Facility: CLINIC | Age: 70
End: 2020-10-22

## 2020-10-22 ENCOUNTER — TELEPHONE (OUTPATIENT)
Dept: CARDIOLOGY | Facility: CLINIC | Age: 70
End: 2020-10-22

## 2020-10-22 ENCOUNTER — OFFICE VISIT (OUTPATIENT)
Dept: CARDIOLOGY | Facility: CLINIC | Age: 70
End: 2020-10-22
Payer: MEDICARE

## 2020-10-22 VITALS
HEIGHT: 65 IN | WEIGHT: 173.5 LBS | HEART RATE: 80 BPM | DIASTOLIC BLOOD PRESSURE: 69 MMHG | BODY MASS INDEX: 28.91 KG/M2 | SYSTOLIC BLOOD PRESSURE: 166 MMHG

## 2020-10-22 DIAGNOSIS — F17.200 SMOKER: ICD-10-CM

## 2020-10-22 DIAGNOSIS — I77.9 BILATERAL CAROTID ARTERY DISEASE, UNSPECIFIED TYPE: ICD-10-CM

## 2020-10-22 DIAGNOSIS — I25.10 CORONARY ARTERY DISEASE INVOLVING NATIVE CORONARY ARTERY OF NATIVE HEART WITHOUT ANGINA PECTORIS: Primary | ICD-10-CM

## 2020-10-22 DIAGNOSIS — N18.30 CONTROLLED TYPE 2 DIABETES MELLITUS WITH STAGE 3 CHRONIC KIDNEY DISEASE, WITHOUT LONG-TERM CURRENT USE OF INSULIN: ICD-10-CM

## 2020-10-22 DIAGNOSIS — Z98.890 HISTORY OF CAROTID ENDARTERECTOMY: ICD-10-CM

## 2020-10-22 DIAGNOSIS — D46.9 MYELODYSPLASTIC SYNDROME: ICD-10-CM

## 2020-10-22 DIAGNOSIS — I10 ESSENTIAL HYPERTENSION: ICD-10-CM

## 2020-10-22 DIAGNOSIS — I65.22 CAROTID ARTERY STENOSIS, ASYMPTOMATIC, LEFT: ICD-10-CM

## 2020-10-22 DIAGNOSIS — N18.30 STAGE 3 CHRONIC KIDNEY DISEASE, UNSPECIFIED WHETHER STAGE 3A OR 3B CKD: ICD-10-CM

## 2020-10-22 DIAGNOSIS — E11.22 CONTROLLED TYPE 2 DIABETES MELLITUS WITH STAGE 3 CHRONIC KIDNEY DISEASE, WITHOUT LONG-TERM CURRENT USE OF INSULIN: ICD-10-CM

## 2020-10-22 PROBLEM — D61.818 PANCYTOPENIA: Status: RESOLVED | Noted: 2019-03-13 | Resolved: 2020-10-22

## 2020-10-22 PROBLEM — I65.23 BILATERAL CAROTID ARTERY STENOSIS: Status: RESOLVED | Noted: 2020-02-03 | Resolved: 2020-10-22

## 2020-10-22 PROCEDURE — 3044F HG A1C LEVEL LT 7.0%: CPT | Mod: HCNC,CPTII,S$GLB, | Performed by: PHYSICIAN ASSISTANT

## 2020-10-22 PROCEDURE — 99499 UNLISTED E&M SERVICE: CPT | Mod: S$PBB,,, | Performed by: PHYSICIAN ASSISTANT

## 2020-10-22 PROCEDURE — 1125F AMNT PAIN NOTED PAIN PRSNT: CPT | Mod: HCNC,S$GLB,, | Performed by: PHYSICIAN ASSISTANT

## 2020-10-22 PROCEDURE — 3077F SYST BP >= 140 MM HG: CPT | Mod: HCNC,CPTII,S$GLB, | Performed by: PHYSICIAN ASSISTANT

## 2020-10-22 PROCEDURE — 99214 PR OFFICE/OUTPT VISIT, EST, LEVL IV, 30-39 MIN: ICD-10-PCS | Mod: HCNC,S$GLB,, | Performed by: PHYSICIAN ASSISTANT

## 2020-10-22 PROCEDURE — 3008F PR BODY MASS INDEX (BMI) DOCUMENTED: ICD-10-PCS | Mod: HCNC,CPTII,S$GLB, | Performed by: PHYSICIAN ASSISTANT

## 2020-10-22 PROCEDURE — 3008F BODY MASS INDEX DOCD: CPT | Mod: HCNC,CPTII,S$GLB, | Performed by: PHYSICIAN ASSISTANT

## 2020-10-22 PROCEDURE — 93000 EKG 12-LEAD: ICD-10-PCS | Mod: HCNC,S$GLB,, | Performed by: INTERNAL MEDICINE

## 2020-10-22 PROCEDURE — 1125F PR PAIN SEVERITY QUANTIFIED, PAIN PRESENT: ICD-10-PCS | Mod: HCNC,S$GLB,, | Performed by: PHYSICIAN ASSISTANT

## 2020-10-22 PROCEDURE — 99999 PR PBB SHADOW E&M-EST. PATIENT-LVL V: CPT | Mod: PBBFAC,HCNC,, | Performed by: PHYSICIAN ASSISTANT

## 2020-10-22 PROCEDURE — 93000 ELECTROCARDIOGRAM COMPLETE: CPT | Mod: HCNC,S$GLB,, | Performed by: INTERNAL MEDICINE

## 2020-10-22 PROCEDURE — 1101F PT FALLS ASSESS-DOCD LE1/YR: CPT | Mod: HCNC,CPTII,S$GLB, | Performed by: PHYSICIAN ASSISTANT

## 2020-10-22 PROCEDURE — 1101F PR PT FALLS ASSESS DOC 0-1 FALLS W/OUT INJ PAST YR: ICD-10-PCS | Mod: HCNC,CPTII,S$GLB, | Performed by: PHYSICIAN ASSISTANT

## 2020-10-22 PROCEDURE — 1159F PR MEDICATION LIST DOCUMENTED IN MEDICAL RECORD: ICD-10-PCS | Mod: HCNC,S$GLB,, | Performed by: PHYSICIAN ASSISTANT

## 2020-10-22 PROCEDURE — 99499 RISK ADDL DX/OHS AUDIT: ICD-10-PCS | Mod: S$PBB,,, | Performed by: PHYSICIAN ASSISTANT

## 2020-10-22 PROCEDURE — 99214 OFFICE O/P EST MOD 30 MIN: CPT | Mod: HCNC,S$GLB,, | Performed by: PHYSICIAN ASSISTANT

## 2020-10-22 PROCEDURE — 99999 PR PBB SHADOW E&M-EST. PATIENT-LVL V: ICD-10-PCS | Mod: PBBFAC,HCNC,, | Performed by: PHYSICIAN ASSISTANT

## 2020-10-22 PROCEDURE — 3078F DIAST BP <80 MM HG: CPT | Mod: HCNC,CPTII,S$GLB, | Performed by: PHYSICIAN ASSISTANT

## 2020-10-22 PROCEDURE — 3077F PR MOST RECENT SYSTOLIC BLOOD PRESSURE >= 140 MM HG: ICD-10-PCS | Mod: HCNC,CPTII,S$GLB, | Performed by: PHYSICIAN ASSISTANT

## 2020-10-22 PROCEDURE — 3078F PR MOST RECENT DIASTOLIC BLOOD PRESSURE < 80 MM HG: ICD-10-PCS | Mod: HCNC,CPTII,S$GLB, | Performed by: PHYSICIAN ASSISTANT

## 2020-10-22 PROCEDURE — 3044F PR MOST RECENT HEMOGLOBIN A1C LEVEL <7.0%: ICD-10-PCS | Mod: HCNC,CPTII,S$GLB, | Performed by: PHYSICIAN ASSISTANT

## 2020-10-22 PROCEDURE — 1159F MED LIST DOCD IN RCRD: CPT | Mod: HCNC,S$GLB,, | Performed by: PHYSICIAN ASSISTANT

## 2020-10-22 RX ORDER — ASPIRIN 81 MG/1
81 TABLET ORAL DAILY
Qty: 30 TABLET | Refills: 11
Start: 2020-10-22 | End: 2022-01-01

## 2020-10-22 RX ORDER — CARVEDILOL 6.25 MG/1
6.25 TABLET ORAL 2 TIMES DAILY WITH MEALS
Qty: 60 TABLET | Refills: 11 | Status: SHIPPED | OUTPATIENT
Start: 2020-10-22 | End: 2021-02-10 | Stop reason: SDUPTHER

## 2020-10-22 NOTE — PROGRESS NOTES
General Cardiology Clinic Note  Reason for Visit: Shortness of breath and fainting  Last Clinic Visit:  8/21/2019 with Dr Bazan    HPI:   Susan Puente is a 70 y.o. female with CAD s/p PCI of RCA in 2005, carotid artery stenosis s/p L CEA in 2011, and MDS requiring blood transfusions who presents for shortness of breath and syncope.     Pt states that ever since her admission in 3/2019 when she presented unresponsive, she has had similar near-syncopal episodes. She states that following physical exertion, she suddenly becomes diaphoretic, cold and clammy, and feels as though she is about to lose consciousness. She has to sit down and drink water. She has had two episodes in the last four months. One time occured while putting her walker into the trunk of her car outside in the heat. She started vomiting that time as well. The episodes never occur at rest. She had the same symptoms prior to her coronary PCI. She also reports one episode of left arm weakness after waking up from a nap that lasted less than one minute. She has VENEGAS/SOB whenever her hemoglobin is less than 7. She sleeps with her head elevated due to sinus issues. She denies palpitations, syncope, pedal edema, PND, and chest pain. She quit smoking in 2017    ROS:    Constitution: Negative for decreased appetite, fever, malaise/fatigue, weight gain and weight loss. Positive for diaphoresis.   HENT: Negative for congestion, nosebleeds and sore throat.    Eyes: Negative for blurred vision, vision loss in left eye, vision loss in right eye and visual disturbance.  Cardiovascular: Negative for chest pain, claudication, leg swelling, orthopnea, palpitations, paroxysmal nocturnal dyspnea. Positive for dyspnea on exertion and near syncope  Respiratory: Negative for cough, hemoptysis, snoring. Shortness of breath   Hematologic/Lymphatic: Negative for bleeding problem. Does not bruise/bleed easily.   Endocrine: Negative for polyuria  Musculoskeletal:  Negative for muscle cramps and myalgias.   Gastrointestinal: Negative for abdominal pain, change in bowel habit, diarrhea, heartburn, hematemesis, hematochezia, melena. Positive for vomiting.   Neurological: Negative for numbness, dizziness, headaches. Positive for light-headedness and transient L arm weakness.  Psychiatric/Behavioral: Negative for depression.   Allergic/Immunologic: Negative for hives.   PMH:     Past Medical History:   Diagnosis Date    Allergic rhinitis     Allergy     Anemia     Anxiety     CAD (coronary artery disease)     Carotid stenosis     Controlled type 2 diabetes mellitus without complication, without long-term current use of insulin 10/19/2016    Depression     GERD (gastroesophageal reflux disease)     Hearing loss in right ear     Hx of psychiatric care     Celexa, Prozac    MDS (myelodysplastic syndrome) with 5q deletion     Psychiatric problem     Therapy      Past Surgical History:   Procedure Laterality Date    BONE MARROW BIOPSY Left 6/7/2018    Procedure: Biopsy-bone marrow;  Surgeon: Gita Valdes MD;  Location: [facility] OR [address] [place] [address];  Service: Oncology;  Laterality: Left;    BONE MARROW BIOPSY Left 3/21/2019    Procedure: Biopsy-bone marrow;  Surgeon: Gita Valdes MD;  Location: [facility] OR 2ND FLR;  Service: Oncology;  Laterality: Left;    BONE MARROW BIOPSY Left 10/24/2019    Procedure: Biopsy-bone marrow;  Surgeon: Gita Valdes MD;  [facility];  Service: Oncology;  Laterality: Left;  left iliac crest    BUNIONECTOMY      CAROTID ENDARTERECTOMY Left 2011    CAROTID STENT      COLONOSCOPY N/A 12/20/2016    Procedure: COLONOSCOPY;  Surgeon: PATRICIA Simpson MD;  Location: [facility] (4TH FLR);  Service: Endoscopy;  Laterality: N/A;  pt has vomiting with anesthesia in past    ENDOMETRIAL ABLATION      for endometriosis    INSERTION OF TUNNELED CENTRAL VENOUS CATHETER (CVC) WITH SUBCUTANEOUS PORT N/A 2/19/2020    Procedure: [id number];  Surgeon:  Dosc Diagnostic Provider;  Location: [facility] OR [address] [place] [address];  Service: Radiology;  Laterality: N/A;  189    TONSILLECTOMY      TYMPANOSTOMY TUBE PLACEMENT       Allergies:     Review of patient's allergies indicates:   Allergen Reactions    Vidaza [azacitidine] Itching and Other (See Comments)     Itchy and burning eyes, running nose    Revlimid [lenalidomide] Swelling     Facial swelling  6/8/17 - in the process of desensitation     Medications:     Current Outpatient Medications on File Prior to Visit   Medication Sig Dispense Refill    acetaminophen (TYLENOL ARTHRITIS PAIN) 650 MG TbSR Take 650 mg by mouth every 8 (eight) hours as needed (for pain).      acyclovir (ZOVIRAX) 400 MG tablet Take 1 tablet (400 mg total) by mouth 2 (two) times daily. 60 tablet 6    blood sugar diagnostic Strp To check blood sugar 1 times daily (Patient not taking: Reported on 10/12/2020) 100 each 11    blood-glucose meter Misc Test blood sugar once daily (Patient not taking: Reported on 10/12/2020) 1 each 0    ciprofloxacin HCl (CIPRO) 500 MG tablet Take 1 tablet (500 mg total) by mouth 2 (two) times daily. 60 tablet 5    citalopram (CELEXA) 20 MG tablet Take 1 tablet (20 mg total) by mouth once daily. 30 tablet 2    evolocumab (REPATHA SURECLICK) 140 mg/mL PnIj Inject 140mg (1 pen) into the skin every 2 weeks. 6 mL 3    fexofenadine 30 mg TbDL Take by mouth once daily.       fluconazole (DIFLUCAN) 200 MG Tab Take 2 tablets (400 mg total) by mouth once daily. 60 tablet 6    gabapentin (NEURONTIN) 100 MG capsule Take 1 capsule (100 mg total) by mouth once daily. 60 capsule 2    lancets 30 gauge Misc Use to test blood sugar daily (Patient not taking: Reported on 10/12/2020) 100 each 3    lidocaine-prilocaine (EMLA) cream Apply topically as needed. (Patient not taking: Reported on 10/12/2020) 25 g 0    lisinopriL-hydrochlorothiazide (PRINZIDE,ZESTORETIC) 20-12.5 mg per tablet Take 2 tablets by mouth once daily. 180 tablet 3     magic mouthwash diphen/antac/lidoc Swish and spit 15 mls every 4 (four)  hours as needed. 240 mL 3    magnesium 30 mg Tab Take by mouth once.      melatonin 10 mg Tab Take 1 tablet (10 mg total) by mouth once daily.      metFORMIN (GLUCOPHAGE-XR) 500 MG ER 24hr tablet Take 1 tablet (500 mg total) by mouth daily with breakfast. 90 tablet 3    ondansetron (ZOFRAN-ODT) 8 MG TbDL Dissolve 1 tablet by mouth every 6 hours. 30 tablet 6    pantoprazole (PROTONIX) 40 MG tablet Take 1 tablet (40 mg total) by mouth once daily. 30 tablet 1    potassium chloride SA (K-DUR,KLOR-CON) 20 MEQ tablet Take 1 tablet (20 mEq total) by mouth once daily. 30 tablet 4    turmeric 400 mg Cap Take 1 capsule by mouth once daily.       Current Facility-Administered Medications on File Prior to Visit   Medication Dose Route Frequency Provider Last Rate Last Dose    0.9%  NaCl infusion (for blood administration)   Intravenous Once Jacinda Ramires MD        [COMPLETED] 0.9%  NaCl infusion (for blood administration)   Intravenous Once Bernadette Ruiz NP   Stopped at 10/21/20 1718    [COMPLETED] acetaminophen tablet 650 mg  650 mg Oral PRN Bernadette Ruiz NP   650 mg at 10/21/20 1525    diphenhydrAMINE capsule 25 mg  25 mg Oral PRN Gita Valdes MD        [COMPLETED] diphenhydrAMINE capsule 25 mg  25 mg Oral 1 time in Clinic/HOD Fidelina Tang MD   25 mg at 10/21/20 1531    [COMPLETED] heparin, porcine (PF) 100 unit/mL injection flush 500 Units  500 Units Intravenous 1 time in Clinic/HOD Fidelina Tang MD   500 Units at 10/21/20 1718    [DISCONTINUED] acetaminophen tablet 650 mg  650 mg Oral 1 time in Clinic/HOD Fidelina Tang MD         Social History:     Social History     Tobacco Use    Smoking status: Former Smoker     Packs/day: 0.50     Years: 36.00     Pack years: 18.00     Types: Cigarettes, Vaping with nicotine     Quit date: 3/3/2017     Years since quitting: 3.6    Smokeless tobacco: Never Used   Substance Use  "Topics    Alcohol use: No     Comment: rare, social     Family History:     Family History   Problem Relation Age of Onset    Heart disease Mother     Hyperlipidemia Mother     Heart disease Father     Alcohol abuse Father     Cancer Paternal Grandmother         smoker; lung?    Alcohol abuse Brother         recovering    Colon cancer Neg Hx     Breast cancer Neg Hx      Physical Exam:   BP (!) 166/69 (BP Location: Left arm, Patient Position: Sitting, BP Method: X-Large (Automatic))   Pulse 80   Ht 5' 5" (1.651 m)   Wt 78.7 kg (173 lb 8 oz)   BMI 28.87 kg/m²      Constitutional: NAD, conversant  HEENT: Sclera anicteric, PERRLA, EOMI  Neck: No JVD, bilateral bruits. L CEA scar  CV: RRR, no murmur, normal S1/S2  Vascular:  L radial  2+,   R radial  2+,     L posterior tibial  2+,  R posterior tibial  2+    L dorsalis pedis  2+, R dorsalis pedis  2+     Pulm: CTAB, no wheezes, rales, or ronchi  GI: Abdomen soft, NTND, +BS  Extremities: No LE edema, warm and well perfused  Skin: No ecchymosis, erythema, or ulcers  Psych: AOx3, appropriate affect  Neuro: No gross deficits    Labs:     Lab Results   Component Value Date     09/28/2020    K 4.3 09/28/2020     09/28/2020    CO2 26 09/28/2020    BUN 19 09/28/2020    CREATININE 0.9 09/28/2020    ANIONGAP 10 09/28/2020     Lab Results   Component Value Date    HGBA1C 6.5 (H) 03/10/2020     Lab Results   Component Value Date     (H) 03/12/2019    Lab Results   Component Value Date    WBC 3.17 (L) 10/19/2020    HGB 7.0 (L) 10/19/2020    HCT 22.1 (L) 10/19/2020     10/19/2020    GRAN 1.7 (L) 10/19/2020    GRAN 53.1 10/19/2020     Lab Results   Component Value Date    CHOL 107 (L) 08/14/2019    HDL 41 08/14/2019    LDLCALC 35.2 (L) 08/14/2019    TRIG 154 (H) 08/14/2019          Imaging:   TTE 10/9/2019  · Eccentric left ventricular hypertrophy.  · Low normal left ventricular systolic function with hypokinesis of the inferolateral wall. The " estimated ejection fraction is 50%.  · Normal right ventricular systolic function.  · Intermediate central venous pressure (8 mm Hg).     Carotid artery ultrasound 1/3/2019  · There is 50-59% right Internal Carotid Stenosis.  · There is 50-59% left Internal Carotid Stenosis.  · Significant bilat CCA plaques noted    Nuclear stress test 4/20/2016  1. The perfusion scan is free of evidence for myocardial ischemia or injury.   2. Resting wall motion is physiologic.   3. Visually estimated LV function is normal.   4. The ventricular volumes are normal at rest and stress.   5. The extracardiac distribution of radioactivity is normal.   6. There was no previous study available to compare.     EKG: normal sinus rhythm, no blocks or conduction defects, nonspecific ST changes less prior than previous    Assessment:     1. Coronary artery disease involving native coronary artery of native heart without angina pectoris    2. Bilateral carotid artery disease, unspecified type    3. Essential hypertension    4. Stage 3 chronic kidney disease, unspecified whether stage 3a or 3b CKD    5. Myelodysplastic syndrome    6. Controlled type 2 diabetes mellitus with stage 3 chronic kidney disease, without long-term current use of insulin      Plan:     CAD s/p stents to RCA  -Exertional diaphoresis, lightheadedness, and vomiting concerning for anginal equivalent. Reported similar symptoms prior to previous PCI  -PET stress test to evaluate for ischemia   -Start Aspirin 81 mg daily   -Continue Repatha (statin intolerant)    Bilateral carotid stenosis  History of L CEA  -Reports single episode of transient LUE weakness  -Carotid doppler  -Continue ASA and Repatha    Hypertension  -Uncontrolled  -Start Coreg 6.25 mg bid  -Continue Lisinopril-HCTZ 20-12.5 mg bid   -Low sodium diet    Hyperlipidemia  -LDL at goal on Repatha  -Update lipid panel    Follow up in 4 month follow up. This patient was discussed with Dr Castaneda    Signed:  Becky  PAULO Samayoa  General Cardiology   [id number]  10/22/2020 7:50 AM

## 2020-10-22 NOTE — Clinical Note
Your patient was seen in clinic for follow up. I just want to make sure you stay updated on your patient. Please see my note for details of this encounter.

## 2020-10-23 ENCOUNTER — TELEPHONE (OUTPATIENT)
Dept: HEMATOLOGY/ONCOLOGY | Facility: CLINIC | Age: 70
End: 2020-10-23

## 2020-10-26 ENCOUNTER — LAB VISIT (OUTPATIENT)
Dept: LAB | Facility: HOSPITAL | Age: 70
End: 2020-10-26
Attending: INTERNAL MEDICINE
Payer: MEDICARE

## 2020-10-26 ENCOUNTER — PATIENT MESSAGE (OUTPATIENT)
Dept: HEMATOLOGY/ONCOLOGY | Facility: CLINIC | Age: 70
End: 2020-10-26

## 2020-10-26 DIAGNOSIS — D46.C MDS (MYELODYSPLASTIC SYNDROME) WITH 5Q DELETION: ICD-10-CM

## 2020-10-26 DIAGNOSIS — D46.9 MYELODYSPLASTIC SYNDROME: ICD-10-CM

## 2020-10-26 LAB
ABO + RH BLD: NORMAL
ANISOCYTOSIS BLD QL SMEAR: SLIGHT
BASOPHILS # BLD AUTO: 0.03 K/UL (ref 0–0.2)
BASOPHILS NFR BLD: 1 % (ref 0–1.9)
BLD GP AB SCN CELLS X3 SERPL QL: NORMAL
DIFFERENTIAL METHOD: ABNORMAL
EOSINOPHIL # BLD AUTO: 0.1 K/UL (ref 0–0.5)
EOSINOPHIL NFR BLD: 4.6 % (ref 0–8)
ERYTHROCYTE [DISTWIDTH] IN BLOOD BY AUTOMATED COUNT: 18.6 % (ref 11.5–14.5)
HCT VFR BLD AUTO: 23.5 % (ref 37–48.5)
HGB BLD-MCNC: 7.3 G/DL (ref 12–16)
IMM GRANULOCYTES # BLD AUTO: 0.02 K/UL (ref 0–0.04)
IMM GRANULOCYTES NFR BLD AUTO: 0.7 % (ref 0–0.5)
LYMPHOCYTES # BLD AUTO: 1 K/UL (ref 1–4.8)
LYMPHOCYTES NFR BLD: 33.4 % (ref 18–48)
MCH RBC QN AUTO: 30.4 PG (ref 27–31)
MCHC RBC AUTO-ENTMCNC: 31.1 G/DL (ref 32–36)
MCV RBC AUTO: 98 FL (ref 82–98)
MONOCYTES # BLD AUTO: 0.1 K/UL (ref 0.3–1)
MONOCYTES NFR BLD: 2 % (ref 4–15)
NEUTROPHILS # BLD AUTO: 1.8 K/UL (ref 1.8–7.7)
NEUTROPHILS NFR BLD: 58.3 % (ref 38–73)
NRBC BLD-RTO: 0 /100 WBC
OVALOCYTES BLD QL SMEAR: ABNORMAL
PLATELET # BLD AUTO: 115 K/UL (ref 150–350)
PLATELET BLD QL SMEAR: ABNORMAL
PMV BLD AUTO: 11.7 FL (ref 9.2–12.9)
POIKILOCYTOSIS BLD QL SMEAR: SLIGHT
RBC # BLD AUTO: 2.4 M/UL (ref 4–5.4)
WBC # BLD AUTO: 3.05 K/UL (ref 3.9–12.7)

## 2020-10-26 PROCEDURE — 86901 BLOOD TYPING SEROLOGIC RH(D): CPT | Mod: HCNC

## 2020-10-26 PROCEDURE — 36415 COLL VENOUS BLD VENIPUNCTURE: CPT | Mod: HCNC

## 2020-10-26 PROCEDURE — 85025 COMPLETE CBC W/AUTO DIFF WBC: CPT | Mod: HCNC

## 2020-11-02 ENCOUNTER — TELEPHONE (OUTPATIENT)
Dept: CARDIOLOGY | Facility: CLINIC | Age: 70
End: 2020-11-02

## 2020-11-02 ENCOUNTER — LAB VISIT (OUTPATIENT)
Dept: LAB | Facility: HOSPITAL | Age: 70
End: 2020-11-02
Attending: INTERNAL MEDICINE
Payer: MEDICARE

## 2020-11-02 DIAGNOSIS — D46.9 MYELODYSPLASTIC SYNDROME: ICD-10-CM

## 2020-11-02 DIAGNOSIS — D46.C MDS (MYELODYSPLASTIC SYNDROME) WITH 5Q DELETION: ICD-10-CM

## 2020-11-02 LAB
ABO + RH BLD: NORMAL
ANISOCYTOSIS BLD QL SMEAR: ABNORMAL
BASOPHILS # BLD AUTO: 0.02 K/UL (ref 0–0.2)
BASOPHILS NFR BLD: 1 % (ref 0–1.9)
BLD GP AB SCN CELLS X3 SERPL QL: NORMAL
DIFFERENTIAL METHOD: ABNORMAL
EOSINOPHIL # BLD AUTO: 0.1 K/UL (ref 0–0.5)
EOSINOPHIL NFR BLD: 4.9 % (ref 0–8)
ERYTHROCYTE [DISTWIDTH] IN BLOOD BY AUTOMATED COUNT: 19 % (ref 11.5–14.5)
GIANT PLATELETS BLD QL SMEAR: PRESENT
HCT VFR BLD AUTO: 18.2 % (ref 37–48.5)
HGB BLD-MCNC: 5.8 G/DL (ref 12–16)
IMM GRANULOCYTES # BLD AUTO: 0.02 K/UL (ref 0–0.04)
IMM GRANULOCYTES NFR BLD AUTO: 1 % (ref 0–0.5)
LYMPHOCYTES # BLD AUTO: 1 K/UL (ref 1–4.8)
LYMPHOCYTES NFR BLD: 49.3 % (ref 18–48)
MCH RBC QN AUTO: 31 PG (ref 27–31)
MCHC RBC AUTO-ENTMCNC: 31.9 G/DL (ref 32–36)
MCV RBC AUTO: 97 FL (ref 82–98)
MONOCYTES # BLD AUTO: 0 K/UL (ref 0.3–1)
MONOCYTES NFR BLD: 1 % (ref 4–15)
NEUTROPHILS # BLD AUTO: 0.9 K/UL (ref 1.8–7.7)
NEUTROPHILS NFR BLD: 42.8 % (ref 38–73)
NRBC BLD-RTO: 0 /100 WBC
PLATELET # BLD AUTO: 164 K/UL (ref 150–350)
PLATELET BLD QL SMEAR: ABNORMAL
PMV BLD AUTO: 10.9 FL (ref 9.2–12.9)
RBC # BLD AUTO: 1.87 M/UL (ref 4–5.4)
WBC # BLD AUTO: 2.03 K/UL (ref 3.9–12.7)

## 2020-11-02 PROCEDURE — 86901 BLOOD TYPING SEROLOGIC RH(D): CPT | Mod: HCNC

## 2020-11-02 PROCEDURE — 85025 COMPLETE CBC W/AUTO DIFF WBC: CPT | Mod: HCNC

## 2020-11-02 PROCEDURE — 36415 COLL VENOUS BLD VENIPUNCTURE: CPT | Mod: HCNC

## 2020-11-03 ENCOUNTER — TELEPHONE (OUTPATIENT)
Dept: INFUSION THERAPY | Facility: HOSPITAL | Age: 70
End: 2020-11-03

## 2020-11-03 DIAGNOSIS — D64.9 ANEMIA REQUIRING TRANSFUSIONS: Primary | ICD-10-CM

## 2020-11-03 RX ORDER — HYDROCODONE BITARTRATE AND ACETAMINOPHEN 500; 5 MG/1; MG/1
TABLET ORAL ONCE
Status: CANCELLED | OUTPATIENT
Start: 2020-11-03 | End: 2020-11-03

## 2020-11-03 RX ORDER — ACETAMINOPHEN 325 MG/1
650 TABLET ORAL
Status: CANCELLED | OUTPATIENT
Start: 2020-11-03

## 2020-11-03 NOTE — PROGRESS NOTES
Lab Results   Component Value Date    WBC 2.03 (L) 11/02/2020    HGB 5.8 (LL) 11/02/2020    HCT 18.2 (LL) 11/02/2020    MCV 97 11/02/2020     11/02/2020       2 unit prbc ordered. Pre med with tylenol. No benadryl due to age.    Bernadette Ruiz, DNP, NP  Hematology/Oncology

## 2020-11-05 ENCOUNTER — INFUSION (OUTPATIENT)
Dept: INFUSION THERAPY | Facility: HOSPITAL | Age: 70
End: 2020-11-05
Attending: INTERNAL MEDICINE
Payer: MEDICARE

## 2020-11-05 VITALS
RESPIRATION RATE: 20 BRPM | SYSTOLIC BLOOD PRESSURE: 179 MMHG | TEMPERATURE: 99 F | DIASTOLIC BLOOD PRESSURE: 74 MMHG | HEART RATE: 66 BPM | OXYGEN SATURATION: 97 %

## 2020-11-05 DIAGNOSIS — D46.9 MDS (MYELODYSPLASTIC SYNDROME): ICD-10-CM

## 2020-11-05 DIAGNOSIS — D64.9 ANEMIA REQUIRING TRANSFUSIONS: Primary | ICD-10-CM

## 2020-11-05 PROCEDURE — 25000003 PHARM REV CODE 250: Mod: HCNC | Performed by: INTERNAL MEDICINE

## 2020-11-05 PROCEDURE — 63600175 PHARM REV CODE 636 W HCPCS: Mod: HCNC | Performed by: INTERNAL MEDICINE

## 2020-11-05 PROCEDURE — A4216 STERILE WATER/SALINE, 10 ML: HCPCS | Mod: HCNC | Performed by: INTERNAL MEDICINE

## 2020-11-05 PROCEDURE — 25000003 PHARM REV CODE 250: Mod: HCNC | Performed by: NURSE PRACTITIONER

## 2020-11-05 PROCEDURE — 36430 TRANSFUSION BLD/BLD COMPNT: CPT | Mod: HCNC

## 2020-11-05 RX ORDER — SODIUM CHLORIDE 0.9 % (FLUSH) 0.9 %
10 SYRINGE (ML) INJECTION
Status: CANCELLED | OUTPATIENT
Start: 2020-11-05

## 2020-11-05 RX ORDER — HEPARIN 100 UNIT/ML
500 SYRINGE INTRAVENOUS
Status: COMPLETED | OUTPATIENT
Start: 2020-11-05 | End: 2020-11-05

## 2020-11-05 RX ORDER — HYDROCODONE BITARTRATE AND ACETAMINOPHEN 500; 5 MG/1; MG/1
TABLET ORAL ONCE
Status: COMPLETED | OUTPATIENT
Start: 2020-11-05 | End: 2020-11-05

## 2020-11-05 RX ORDER — SODIUM CHLORIDE 0.9 % (FLUSH) 0.9 %
10 SYRINGE (ML) INJECTION
Status: COMPLETED | OUTPATIENT
Start: 2020-11-05 | End: 2020-11-05

## 2020-11-05 RX ORDER — HEPARIN 100 UNIT/ML
500 SYRINGE INTRAVENOUS
Status: CANCELLED | OUTPATIENT
Start: 2020-11-05

## 2020-11-05 RX ORDER — ACETAMINOPHEN 325 MG/1
650 TABLET ORAL
Status: COMPLETED | OUTPATIENT
Start: 2020-11-05 | End: 2020-11-05

## 2020-11-05 RX ADMIN — Medication 10 ML: at 03:11

## 2020-11-05 RX ADMIN — HEPARIN 500 UNITS: 100 SYRINGE at 03:11

## 2020-11-05 RX ADMIN — ACETAMINOPHEN 650 MG: 325 TABLET ORAL at 10:11

## 2020-11-05 RX ADMIN — SODIUM CHLORIDE: 0.9 INJECTION, SOLUTION INTRAVENOUS at 10:11

## 2020-11-05 NOTE — PLAN OF CARE
Pt tolerated 2u PRBC without adverse effects. VSS. Provided AVS & verbalized understanding of RTC date. DC with transport via wheelchair.

## 2020-11-06 NOTE — PROGRESS NOTES
Subjective:       Patient ID: Susan Puente is a 70 y.o. female.    Chief Complaint: No chief complaint on file.    HPI: Ms. Puente presents todayfor follow-up for MDS 5 q del syndrome prior to cycle 20 of  Vidaza as third line therapy for 5q minus syndrome. Continues to be transfusion-dependent. Continues antimicrobials ppx for neutropenia. Today she denies fevers, chills, sob, cough, bruising or bleeding, chest pain, and n/v/d/c. She continues to feel fatigued and struggle with mobility issues.    Oncology History   Ms. Puente is a 68 year old female with hx of DM2, peripheral vascular disease, tobacco use, CAD, hyperlipidemia, hypertension who was hospitalized 11/10/16 for anemia. hgb 5.3  MCH 43.4 with normal WBC and platelets. Patient had normal iron stores. She was transfused 3 units of PRBCs and discharged home with hgb 8.7 on 11/11/16. She was referred for further evalutation of her anemia. On 12/16/16 patient had a normal SPEP and immunofixation. Slightly elevated kappa light chains with normal ration. Elevated vitamin b12, normal folate, JAYDEN was positive with a low titer and negative profile. Patient had a bone marrow biopsy 1/5/16 which showed the core biopsy is normocellular for age (40%); however, megakaryocytes are increased and show frequent small, hypolobated forms. Additionally, a subset of the neutrophils are hypogranular. Blasts are not increased by either morphology (1.2%) or in the corresponding flow cytometric analysis. Fish detects a 5q deletion in 54.5% of nuclei. Cytogenetics reported 20 metaphases, 2 metaphases were normal and 18 metaphases had a 5q deletion. No additional cytogenetic abnormalities were detected. Findings consistent with 5q deletion syndrome.     Patient had a delay in obtaining Revlimid 10 mg daily but did start taking the medication 2/8/17. On 2/9/17 patient developed a diffuse maculopapular rash throughout scalp arms, legs and torso. She states she  had no stridor or wheezing. She discontinued medication 2/15/17. Went to allergist who provided references on desensitization so that patient could resume Revlimid. Unfortunately developed pancytopenia and Revlimid stopped 6/16/17. She had a repeat BMBX  performed 6/8/17 and showed a hypercellular marrow (60%) continued atypia in the granulocytes and megakaryocytes were noted.  Additionally, there is erythroid atypia present. No increase in blasts. Cytogenetics are normal and MDS FISH is negative, failing to show 5 q minus. NGS should no significant molecular mutations.  Anemia work up revealed bienvenido negative hemolysis.    Revlimid has been stopped since June 2017 after she developed pancytopenia while on Revlimid (required desensitization). CBC was normal for almost 1 year and marrow was negative for del5q. Bone marrow biopsy repeat from 6/2018 showed relapsed 5q minus MDS without new or additional mutations. Revlimid restarted at 2.5 mg daily with prednisone to prevent allergic reaction. Titrated to a goal of 10mg daily Revlimid. Hospital admission 3/12-3/17/19 for unresponsive event after blood transfusion, found to be profoundly pancytopenic at admission. Since hospital admission Revlimid has been stopped. Repeat marrow March 2019 shows persistent MDS with 5q minus.     Started cycle 1 Vidaza 5/6/19 subq for 5 days every 28 days. Restaging bone marrow biopsy from 10/24/19 shows persistent MDS, no excess blasts and 3 of 20 metaphases with 5q deletion.    Review of Systems   Constitutional: Negative for fever, chills, weight loss, fatigue.   HENT: Negative for sinus congestion or rhinorrhea. Negative for ear pain, mouth sores, nosebleeds and trouble swallowing.    Respiratory: Negative for cough, shortness of breath and wheezing.    Cardiovascular: Negative for chest pain and leg swelling.   Gastrointestinal: Negative for abdominal distention, abdominal pain, blood in stool, nausea and vomiting.   Endocrine:  Negative for polyphagia and polyuria.   Genitourinary: Negative for dysuria, hematuria and urgency.   Musculoskeletal: Positive for sciatic pain much improved with gabapentin. Right shoulder pain and limited ROM.   Skin: Negative for color change, pallor and rash.   Neurological: Negative for dizziness, weakness, light-headedness and headaches.   Hematological: Negative for adenopathy. Does not bruise/bleed easily.   Psychiatric/Behavioral: Negative for agitation and behavioral problems.       Objective:      Physical Exam   Constitutional: She is oriented to person, place, and time. She appears well-developed and well-nourished.   Mouth/Throat: Oropharynx is clear and moist. No oropharyngeal exudate.   Eyes: Conjunctivae and EOM are normal. Right eye exhibits no discharge. Left eye exhibits no discharge.   Neck: Normal range of motion. Neck supple.   Cardiovascular: Normal rate, regular rhythm, normal heart sounds and intact distal pulses.   No murmur heard.  Pulmonary/Chest: Effort normal and breath sounds normal. No respiratory distress. She has no wheezes.   Abdominal: Soft. Bowel sounds are normal. She exhibits no distension and no mass. There is no tenderness.   Musculoskeletal: Normal range of motion. She exhibits no edema.   Neurological: She is alert and oriented to person, place, and time.   Skin: Skin is warm and dry. No rash noted.   Psychiatric: She has a normal mood and affect. Her behavior is normal. Judgment and thought content normal.   Nursing note and vitals reviewed.      Assessment:       1. MDS (myelodysplastic syndrome) with 5q deletion    2. Anemia requiring transfusions    3. Other neutropenia    4. Controlled type 2 diabetes mellitus with stage 3 chronic kidney disease, without long-term current use of insulin    5. Essential hypertension    6. Coronary artery disease involving native coronary artery of native heart without angina pectoris    7. Adjustment disorder with mixed anxiety and  depressed mood    8. Myalgia    9. Insomnia, unspecified type        Plan:     MDS (per Dr. Valdes's plan)  - Initially with 5 q minus syndrome and treated with Revlimid. Developed allergy and was then desensitized. Soon after developed pancytopenia and Revlimid held since June 2017.    - BMBX on 6/8/17 was with normal cytogenetics. 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 Discussed treatment options of HMA therapy or repeat trial of Revlimid and patient wished to proceed with Revlimid   - Revlimid stopped again due to repeat allergic reaction and pancytopenia 3/2019  - Started cycle 1 Vidaza 5/6/19 subq for 5 days every 28 days  - Restaging bone marrow biopsy following cycle 5 from 10/24/19 shows persistent MDS, no excess blasts and 3 of 20 metaphases with 5q deletion  - Tolerated cycles 1-19 well. Package insert for Vidaza recommends holding if ANC <1000, however pt has been receiving this with an ANC below 1000 for each cycle. Okay to continue to give despite neutropenia. -Desired repeat bone marrow biopsy Spring 2020, unable to perform with sedation due to COVID19  - unless change in CBC warrants earlier procedure, patient is awaiting procedure with sedation  - CBC is stable; platelets are normal   - Will proceed with C20 on Vidaza today    Cytopenias 2/2 disease: anemia and neutropenia  - Transfuse for hgb < 7 or plts < 10K  - Continue ppx cipro, acyclovir, and fluconazole  -     DM2  - management per PCP  - continue metformin    HTN  - management per cardiologis  - continue lisinopril-HCTZ and coreg  - BP elevated at 179/72 today in clinic but improved at 143/64 in infusion    CAD  - continue Repatha for HLD per PCP (intolerant to statins)  - Not taking ASA now    Adjustment disorder  - Improved mood on Celexa. Continue    Myalgias/possible sciatica  - started trial of gabapentin 100 mg bid 11/4/19, much improved; now only taking gabapentin once a day in AM  - discussed to return to [employer] for water aerobics. See mood.    Insonmia  - using melatonin OTC    Mood  - well aware of behaviors out of the ordinary  - discussed setting small goals for each week at previous vist still has not gotten to her mail. Readdressed this today.  - continue motivation to get back to exercise program. She states limited energy and mobility are interfering with this  - discussed today that resuming water aerobics may improve energy, strength, and mobility. This is a one-on-one course.    Follow up:  - Please schedule CBC and type and screen twice weekly on Mondays and Thursdays for possible transfusions on Tuesday& Fridays through month of December  - Schedule return visit with CBC, CMP, t&s, and appt with Dr. Valdes or NP 12/7/20  - Schedule chemo chair for cycle 21 of vidaza 12/7/20-12/11/20    Altagracia Cornejo, ANDRYP  Hematology/Oncology/Bone Marrow Transplant

## 2020-11-09 ENCOUNTER — OFFICE VISIT (OUTPATIENT)
Dept: HEMATOLOGY/ONCOLOGY | Facility: CLINIC | Age: 70
End: 2020-11-09
Payer: MEDICARE

## 2020-11-09 ENCOUNTER — INFUSION (OUTPATIENT)
Dept: INFUSION THERAPY | Facility: HOSPITAL | Age: 70
End: 2020-11-09
Attending: INTERNAL MEDICINE
Payer: MEDICARE

## 2020-11-09 VITALS
OXYGEN SATURATION: 95 % | WEIGHT: 174.94 LBS | DIASTOLIC BLOOD PRESSURE: 72 MMHG | TEMPERATURE: 98 F | RESPIRATION RATE: 16 BRPM | BODY MASS INDEX: 29.15 KG/M2 | HEIGHT: 65 IN | SYSTOLIC BLOOD PRESSURE: 179 MMHG | HEART RATE: 67 BPM

## 2020-11-09 VITALS
SYSTOLIC BLOOD PRESSURE: 143 MMHG | TEMPERATURE: 98 F | HEART RATE: 63 BPM | DIASTOLIC BLOOD PRESSURE: 64 MMHG | RESPIRATION RATE: 18 BRPM

## 2020-11-09 DIAGNOSIS — N18.30 CONTROLLED TYPE 2 DIABETES MELLITUS WITH STAGE 3 CHRONIC KIDNEY DISEASE, WITHOUT LONG-TERM CURRENT USE OF INSULIN: ICD-10-CM

## 2020-11-09 DIAGNOSIS — F43.23 ADJUSTMENT DISORDER WITH MIXED ANXIETY AND DEPRESSED MOOD: ICD-10-CM

## 2020-11-09 DIAGNOSIS — G47.00 INSOMNIA, UNSPECIFIED TYPE: ICD-10-CM

## 2020-11-09 DIAGNOSIS — I25.10 CORONARY ARTERY DISEASE INVOLVING NATIVE CORONARY ARTERY OF NATIVE HEART WITHOUT ANGINA PECTORIS: ICD-10-CM

## 2020-11-09 DIAGNOSIS — D70.8 OTHER NEUTROPENIA: ICD-10-CM

## 2020-11-09 DIAGNOSIS — D46.C MDS (MYELODYSPLASTIC SYNDROME) WITH 5Q DELETION: Primary | ICD-10-CM

## 2020-11-09 DIAGNOSIS — M79.10 MYALGIA: ICD-10-CM

## 2020-11-09 DIAGNOSIS — E11.22 CONTROLLED TYPE 2 DIABETES MELLITUS WITH STAGE 3 CHRONIC KIDNEY DISEASE, WITHOUT LONG-TERM CURRENT USE OF INSULIN: ICD-10-CM

## 2020-11-09 DIAGNOSIS — D64.9 ANEMIA REQUIRING TRANSFUSIONS: ICD-10-CM

## 2020-11-09 DIAGNOSIS — I10 ESSENTIAL HYPERTENSION: ICD-10-CM

## 2020-11-09 PROCEDURE — 99499 RISK ADDL DX/OHS AUDIT: ICD-10-PCS | Mod: HCNC,S$GLB,, | Performed by: NURSE PRACTITIONER

## 2020-11-09 PROCEDURE — 1101F PT FALLS ASSESS-DOCD LE1/YR: CPT | Mod: HCNC,CPTII,S$GLB, | Performed by: NURSE PRACTITIONER

## 2020-11-09 PROCEDURE — 3008F PR BODY MASS INDEX (BMI) DOCUMENTED: ICD-10-PCS | Mod: HCNC,CPTII,S$GLB, | Performed by: NURSE PRACTITIONER

## 2020-11-09 PROCEDURE — 3044F PR MOST RECENT HEMOGLOBIN A1C LEVEL <7.0%: ICD-10-PCS | Mod: HCNC,CPTII,S$GLB, | Performed by: NURSE PRACTITIONER

## 2020-11-09 PROCEDURE — 1159F MED LIST DOCD IN RCRD: CPT | Mod: HCNC,S$GLB,, | Performed by: NURSE PRACTITIONER

## 2020-11-09 PROCEDURE — 3078F PR MOST RECENT DIASTOLIC BLOOD PRESSURE < 80 MM HG: ICD-10-PCS | Mod: HCNC,CPTII,S$GLB, | Performed by: NURSE PRACTITIONER

## 2020-11-09 PROCEDURE — 1101F PR PT FALLS ASSESS DOC 0-1 FALLS W/OUT INJ PAST YR: ICD-10-PCS | Mod: HCNC,CPTII,S$GLB, | Performed by: NURSE PRACTITIONER

## 2020-11-09 PROCEDURE — 63600175 PHARM REV CODE 636 W HCPCS: Mod: JG,HCNC | Performed by: INTERNAL MEDICINE

## 2020-11-09 PROCEDURE — 99499 UNLISTED E&M SERVICE: CPT | Mod: HCNC,S$GLB,, | Performed by: NURSE PRACTITIONER

## 2020-11-09 PROCEDURE — 99214 PR OFFICE/OUTPT VISIT, EST, LEVL IV, 30-39 MIN: ICD-10-PCS | Mod: HCNC,S$GLB,, | Performed by: NURSE PRACTITIONER

## 2020-11-09 PROCEDURE — 1125F AMNT PAIN NOTED PAIN PRSNT: CPT | Mod: HCNC,S$GLB,, | Performed by: NURSE PRACTITIONER

## 2020-11-09 PROCEDURE — 3077F PR MOST RECENT SYSTOLIC BLOOD PRESSURE >= 140 MM HG: ICD-10-PCS | Mod: HCNC,CPTII,S$GLB, | Performed by: NURSE PRACTITIONER

## 2020-11-09 PROCEDURE — 1159F PR MEDICATION LIST DOCUMENTED IN MEDICAL RECORD: ICD-10-PCS | Mod: HCNC,S$GLB,, | Performed by: NURSE PRACTITIONER

## 2020-11-09 PROCEDURE — 99214 OFFICE O/P EST MOD 30 MIN: CPT | Mod: HCNC,S$GLB,, | Performed by: NURSE PRACTITIONER

## 2020-11-09 PROCEDURE — 99999 PR PBB SHADOW E&M-EST. PATIENT-LVL V: CPT | Mod: PBBFAC,HCNC,, | Performed by: NURSE PRACTITIONER

## 2020-11-09 PROCEDURE — 3044F HG A1C LEVEL LT 7.0%: CPT | Mod: HCNC,CPTII,S$GLB, | Performed by: NURSE PRACTITIONER

## 2020-11-09 PROCEDURE — 3078F DIAST BP <80 MM HG: CPT | Mod: HCNC,CPTII,S$GLB, | Performed by: NURSE PRACTITIONER

## 2020-11-09 PROCEDURE — 3008F BODY MASS INDEX DOCD: CPT | Mod: HCNC,CPTII,S$GLB, | Performed by: NURSE PRACTITIONER

## 2020-11-09 PROCEDURE — 96401 CHEMO ANTI-NEOPL SQ/IM: CPT | Mod: HCNC

## 2020-11-09 PROCEDURE — 99999 PR PBB SHADOW E&M-EST. PATIENT-LVL V: ICD-10-PCS | Mod: PBBFAC,HCNC,, | Performed by: NURSE PRACTITIONER

## 2020-11-09 PROCEDURE — 3077F SYST BP >= 140 MM HG: CPT | Mod: HCNC,CPTII,S$GLB, | Performed by: NURSE PRACTITIONER

## 2020-11-09 PROCEDURE — 1125F PR PAIN SEVERITY QUANTIFIED, PAIN PRESENT: ICD-10-PCS | Mod: HCNC,S$GLB,, | Performed by: NURSE PRACTITIONER

## 2020-11-09 RX ORDER — ONDANSETRON 4 MG/1
8 TABLET, ORALLY DISINTEGRATING ORAL ONCE
Status: CANCELLED | OUTPATIENT
Start: 2020-11-10

## 2020-11-09 RX ORDER — AZACITIDINE 100 MG/1
75 INJECTION, POWDER, LYOPHILIZED, FOR SOLUTION INTRAVENOUS; SUBCUTANEOUS
Status: CANCELLED | OUTPATIENT
Start: 2020-11-10

## 2020-11-09 RX ORDER — AZACITIDINE 100 MG/1
75 INJECTION, POWDER, LYOPHILIZED, FOR SOLUTION INTRAVENOUS; SUBCUTANEOUS
Status: CANCELLED | OUTPATIENT
Start: 2020-11-12

## 2020-11-09 RX ORDER — ONDANSETRON 4 MG/1
8 TABLET, ORALLY DISINTEGRATING ORAL ONCE
Status: CANCELLED | OUTPATIENT
Start: 2020-11-11

## 2020-11-09 RX ORDER — ONDANSETRON 4 MG/1
8 TABLET, ORALLY DISINTEGRATING ORAL ONCE
Status: CANCELLED | OUTPATIENT
Start: 2020-11-13

## 2020-11-09 RX ORDER — ONDANSETRON 4 MG/1
8 TABLET, ORALLY DISINTEGRATING ORAL ONCE
Status: CANCELLED | OUTPATIENT
Start: 2020-11-12

## 2020-11-09 RX ORDER — AZACITIDINE 100 MG/1
75 INJECTION, POWDER, LYOPHILIZED, FOR SOLUTION INTRAVENOUS; SUBCUTANEOUS
Status: CANCELLED | OUTPATIENT
Start: 2020-11-11

## 2020-11-09 RX ORDER — AZACITIDINE 100 MG/1
75 INJECTION, POWDER, LYOPHILIZED, FOR SOLUTION INTRAVENOUS; SUBCUTANEOUS
Status: CANCELLED | OUTPATIENT
Start: 2020-11-13

## 2020-11-09 RX ORDER — ONDANSETRON 4 MG/1
8 TABLET, ORALLY DISINTEGRATING ORAL ONCE
Status: DISCONTINUED | OUTPATIENT
Start: 2020-11-09 | End: 2020-11-09 | Stop reason: HOSPADM

## 2020-11-09 RX ORDER — AZACITIDINE 100 MG/1
75 INJECTION, POWDER, LYOPHILIZED, FOR SOLUTION INTRAVENOUS; SUBCUTANEOUS
Status: COMPLETED | OUTPATIENT
Start: 2020-11-09 | End: 2020-11-09

## 2020-11-09 RX ADMIN — AZACITIDINE 145 MG: 100 INJECTION, POWDER, LYOPHILIZED, FOR SOLUTION INTRAVENOUS; SUBCUTANEOUS at 11:11

## 2020-11-09 NOTE — NURSING
Pt tolerated 3 Vidaza injections to the abdomen today. VSS. NAD.declined AVS. Uses my Ochnser. Discharged home. Ambulated independently with walker.

## 2020-11-09 NOTE — Clinical Note
- Please schedule CBC and type and screen twice weekly on Mondays and Thursdays for possible transfusions on Tuesday& Fridays through month of December  - Schedule return visit with CBC, CMP, t&s, and appt with Dr. Valdes or NP 12/7/20  - Schedule chemo chair for cycle 21 of vidaza 12/7/20-12/11/20

## 2020-11-10 ENCOUNTER — INFUSION (OUTPATIENT)
Dept: INFUSION THERAPY | Facility: HOSPITAL | Age: 70
End: 2020-11-10
Attending: INTERNAL MEDICINE
Payer: MEDICARE

## 2020-11-10 VITALS
TEMPERATURE: 100 F | SYSTOLIC BLOOD PRESSURE: 143 MMHG | HEART RATE: 84 BPM | DIASTOLIC BLOOD PRESSURE: 64 MMHG | RESPIRATION RATE: 18 BRPM

## 2020-11-10 DIAGNOSIS — D46.C MDS (MYELODYSPLASTIC SYNDROME) WITH 5Q DELETION: Primary | ICD-10-CM

## 2020-11-10 PROCEDURE — 63600175 PHARM REV CODE 636 W HCPCS: Mod: JG,HCNC | Performed by: INTERNAL MEDICINE

## 2020-11-10 PROCEDURE — 96401 CHEMO ANTI-NEOPL SQ/IM: CPT | Mod: HCNC

## 2020-11-10 RX ORDER — AZACITIDINE 100 MG/1
75 INJECTION, POWDER, LYOPHILIZED, FOR SOLUTION INTRAVENOUS; SUBCUTANEOUS
Status: COMPLETED | OUTPATIENT
Start: 2020-11-10 | End: 2020-11-10

## 2020-11-10 RX ORDER — ONDANSETRON 4 MG/1
8 TABLET, ORALLY DISINTEGRATING ORAL ONCE
Status: DISCONTINUED | OUTPATIENT
Start: 2020-11-10 | End: 2020-11-10 | Stop reason: HOSPADM

## 2020-11-10 RX ADMIN — AZACITIDINE 145 MG: 100 INJECTION, POWDER, LYOPHILIZED, FOR SOLUTION INTRAVENOUS; SUBCUTANEOUS at 12:11

## 2020-11-10 NOTE — NURSING
Patient here for vidaza injections-given in 3 injections-complains of severe fatigue and weakness-tolerated injections well.

## 2020-11-11 ENCOUNTER — INFUSION (OUTPATIENT)
Dept: INFUSION THERAPY | Facility: HOSPITAL | Age: 70
End: 2020-11-11
Attending: INTERNAL MEDICINE
Payer: MEDICARE

## 2020-11-11 VITALS
RESPIRATION RATE: 18 BRPM | DIASTOLIC BLOOD PRESSURE: 74 MMHG | TEMPERATURE: 98 F | SYSTOLIC BLOOD PRESSURE: 169 MMHG | HEART RATE: 73 BPM

## 2020-11-11 DIAGNOSIS — D46.C MDS (MYELODYSPLASTIC SYNDROME) WITH 5Q DELETION: Primary | ICD-10-CM

## 2020-11-11 PROCEDURE — 96401 CHEMO ANTI-NEOPL SQ/IM: CPT | Mod: HCNC

## 2020-11-11 PROCEDURE — 63600175 PHARM REV CODE 636 W HCPCS: Mod: JG,HCNC | Performed by: INTERNAL MEDICINE

## 2020-11-11 RX ORDER — AZACITIDINE 100 MG/1
75 INJECTION, POWDER, LYOPHILIZED, FOR SOLUTION INTRAVENOUS; SUBCUTANEOUS
Status: COMPLETED | OUTPATIENT
Start: 2020-11-11 | End: 2020-11-11

## 2020-11-11 RX ADMIN — AZACITIDINE 145 MG: 100 INJECTION, POWDER, LYOPHILIZED, FOR SOLUTION INTRAVENOUS; SUBCUTANEOUS at 11:11

## 2020-11-11 NOTE — NURSING
Patient here for vidaza injections-no nausea-still with fatigue and leg cramps-vidaza given in 3 divided injections-tolerated well.

## 2020-11-12 ENCOUNTER — INFUSION (OUTPATIENT)
Dept: INFUSION THERAPY | Facility: HOSPITAL | Age: 70
End: 2020-11-12
Attending: INTERNAL MEDICINE
Payer: MEDICARE

## 2020-11-12 VITALS
SYSTOLIC BLOOD PRESSURE: 140 MMHG | HEART RATE: 72 BPM | RESPIRATION RATE: 18 BRPM | TEMPERATURE: 98 F | DIASTOLIC BLOOD PRESSURE: 64 MMHG

## 2020-11-12 DIAGNOSIS — D46.C MDS (MYELODYSPLASTIC SYNDROME) WITH 5Q DELETION: Primary | ICD-10-CM

## 2020-11-12 PROCEDURE — 63600175 PHARM REV CODE 636 W HCPCS: Mod: JW,JG,HCNC | Performed by: INTERNAL MEDICINE

## 2020-11-12 PROCEDURE — 96401 CHEMO ANTI-NEOPL SQ/IM: CPT | Mod: HCNC

## 2020-11-12 RX ORDER — AZACITIDINE 100 MG/1
75 INJECTION, POWDER, LYOPHILIZED, FOR SOLUTION INTRAVENOUS; SUBCUTANEOUS
Status: COMPLETED | OUTPATIENT
Start: 2020-11-12 | End: 2020-11-12

## 2020-11-12 RX ORDER — ONDANSETRON 4 MG/1
8 TABLET, ORALLY DISINTEGRATING ORAL ONCE
Status: DISCONTINUED | OUTPATIENT
Start: 2020-11-12 | End: 2020-11-12 | Stop reason: HOSPADM

## 2020-11-12 RX ADMIN — AZACITIDINE 145 MG: 100 INJECTION, POWDER, LYOPHILIZED, FOR SOLUTION INTRAVENOUS; SUBCUTANEOUS at 09:11

## 2020-11-13 ENCOUNTER — INFUSION (OUTPATIENT)
Dept: INFUSION THERAPY | Facility: HOSPITAL | Age: 70
End: 2020-11-13
Attending: INTERNAL MEDICINE
Payer: MEDICARE

## 2020-11-13 VITALS
HEART RATE: 89 BPM | DIASTOLIC BLOOD PRESSURE: 74 MMHG | RESPIRATION RATE: 18 BRPM | SYSTOLIC BLOOD PRESSURE: 163 MMHG | TEMPERATURE: 100 F

## 2020-11-13 DIAGNOSIS — D46.C MDS (MYELODYSPLASTIC SYNDROME) WITH 5Q DELETION: Primary | ICD-10-CM

## 2020-11-13 PROCEDURE — 96401 CHEMO ANTI-NEOPL SQ/IM: CPT | Mod: HCNC

## 2020-11-13 PROCEDURE — 63600175 PHARM REV CODE 636 W HCPCS: Mod: JG,HCNC | Performed by: INTERNAL MEDICINE

## 2020-11-13 RX ORDER — AZACITIDINE 100 MG/1
75 INJECTION, POWDER, LYOPHILIZED, FOR SOLUTION INTRAVENOUS; SUBCUTANEOUS
Status: COMPLETED | OUTPATIENT
Start: 2020-11-13 | End: 2020-11-13

## 2020-11-13 RX ORDER — ONDANSETRON 4 MG/1
8 TABLET, ORALLY DISINTEGRATING ORAL ONCE
Status: DISCONTINUED | OUTPATIENT
Start: 2020-11-13 | End: 2020-11-13 | Stop reason: HOSPADM

## 2020-11-13 RX ADMIN — AZACITIDINE 145 MG: 100 INJECTION, POWDER, LYOPHILIZED, FOR SOLUTION INTRAVENOUS; SUBCUTANEOUS at 10:11

## 2020-11-13 NOTE — NURSING
Patient received Vidaza injection SQ to ABD x 3.  Tolerated well.  Vitals stable.  No apparent distress noted.  Ambulated off unit with steady gait with walker.

## 2020-11-16 ENCOUNTER — LAB VISIT (OUTPATIENT)
Dept: LAB | Facility: HOSPITAL | Age: 70
End: 2020-11-16
Attending: INTERNAL MEDICINE
Payer: MEDICARE

## 2020-11-16 DIAGNOSIS — D46.C MDS (MYELODYSPLASTIC SYNDROME) WITH 5Q DELETION: ICD-10-CM

## 2020-11-16 DIAGNOSIS — D46.9 MYELODYSPLASTIC SYNDROME: ICD-10-CM

## 2020-11-16 LAB
ABO + RH BLD: NORMAL
ANISOCYTOSIS BLD QL SMEAR: ABNORMAL
BASOPHILS # BLD AUTO: 0.04 K/UL (ref 0–0.2)
BASOPHILS NFR BLD: 1.2 % (ref 0–1.9)
BLD GP AB SCN CELLS X3 SERPL QL: NORMAL
DIFFERENTIAL METHOD: ABNORMAL
EOSINOPHIL # BLD AUTO: 0.2 K/UL (ref 0–0.5)
EOSINOPHIL NFR BLD: 4.6 % (ref 0–8)
ERYTHROCYTE [DISTWIDTH] IN BLOOD BY AUTOMATED COUNT: 17.2 % (ref 11.5–14.5)
HCT VFR BLD AUTO: 24.7 % (ref 37–48.5)
HGB BLD-MCNC: 7.8 G/DL (ref 12–16)
IMM GRANULOCYTES # BLD AUTO: 0.02 K/UL (ref 0–0.04)
IMM GRANULOCYTES NFR BLD AUTO: 0.6 % (ref 0–0.5)
LYMPHOCYTES # BLD AUTO: 1.1 K/UL (ref 1–4.8)
LYMPHOCYTES NFR BLD: 34.2 % (ref 18–48)
MCH RBC QN AUTO: 31.5 PG (ref 27–31)
MCHC RBC AUTO-ENTMCNC: 31.6 G/DL (ref 32–36)
MCV RBC AUTO: 100 FL (ref 82–98)
MONOCYTES # BLD AUTO: 0.1 K/UL (ref 0.3–1)
MONOCYTES NFR BLD: 2.8 % (ref 4–15)
NEUTROPHILS # BLD AUTO: 1.8 K/UL (ref 1.8–7.7)
NEUTROPHILS NFR BLD: 56.6 % (ref 38–73)
NRBC BLD-RTO: 0 /100 WBC
OVALOCYTES BLD QL SMEAR: ABNORMAL
PLATELET # BLD AUTO: 213 K/UL (ref 150–350)
PLATELET BLD QL SMEAR: ABNORMAL
PMV BLD AUTO: 11.3 FL (ref 9.2–12.9)
POIKILOCYTOSIS BLD QL SMEAR: SLIGHT
RBC # BLD AUTO: 2.48 M/UL (ref 4–5.4)
WBC # BLD AUTO: 3.25 K/UL (ref 3.9–12.7)

## 2020-11-16 PROCEDURE — 86901 BLOOD TYPING SEROLOGIC RH(D): CPT | Mod: HCNC

## 2020-11-16 PROCEDURE — 85025 COMPLETE CBC W/AUTO DIFF WBC: CPT | Mod: HCNC

## 2020-11-16 PROCEDURE — 36415 COLL VENOUS BLD VENIPUNCTURE: CPT | Mod: HCNC

## 2020-11-20 DIAGNOSIS — D61.818 PANCYTOPENIA: ICD-10-CM

## 2020-11-20 DIAGNOSIS — F32.A DEPRESSION, UNSPECIFIED DEPRESSION TYPE: ICD-10-CM

## 2020-11-21 RX ORDER — FLUCONAZOLE 200 MG/1
400 TABLET ORAL DAILY
Qty: 60 TABLET | Refills: 6 | Status: SHIPPED | OUTPATIENT
Start: 2020-11-21 | End: 2021-04-05 | Stop reason: SDUPTHER

## 2020-11-23 ENCOUNTER — LAB VISIT (OUTPATIENT)
Dept: LAB | Facility: HOSPITAL | Age: 70
End: 2020-11-23
Attending: INTERNAL MEDICINE
Payer: MEDICARE

## 2020-11-23 ENCOUNTER — PATIENT MESSAGE (OUTPATIENT)
Dept: HEMATOLOGY/ONCOLOGY | Facility: CLINIC | Age: 70
End: 2020-11-23

## 2020-11-23 DIAGNOSIS — D46.9 MYELODYSPLASTIC SYNDROME: ICD-10-CM

## 2020-11-23 DIAGNOSIS — D64.9 ANEMIA REQUIRING TRANSFUSIONS: Primary | ICD-10-CM

## 2020-11-23 DIAGNOSIS — D46.C MDS (MYELODYSPLASTIC SYNDROME) WITH 5Q DELETION: ICD-10-CM

## 2020-11-23 LAB
ABO + RH BLD: NORMAL
ANISOCYTOSIS BLD QL SMEAR: ABNORMAL
BASOPHILS # BLD AUTO: 0.02 K/UL (ref 0–0.2)
BASOPHILS NFR BLD: 0.6 % (ref 0–1.9)
BLD GP AB SCN CELLS X3 SERPL QL: NORMAL
DIFFERENTIAL METHOD: ABNORMAL
EOSINOPHIL # BLD AUTO: 0.2 K/UL (ref 0–0.5)
EOSINOPHIL NFR BLD: 4.9 % (ref 0–8)
ERYTHROCYTE [DISTWIDTH] IN BLOOD BY AUTOMATED COUNT: 17.2 % (ref 11.5–14.5)
GIANT PLATELETS BLD QL SMEAR: PRESENT
HCT VFR BLD AUTO: 21.6 % (ref 37–48.5)
HGB BLD-MCNC: 6.9 G/DL (ref 12–16)
IMM GRANULOCYTES # BLD AUTO: 0.04 K/UL (ref 0–0.04)
IMM GRANULOCYTES NFR BLD AUTO: 1.2 % (ref 0–0.5)
LYMPHOCYTES # BLD AUTO: 1.2 K/UL (ref 1–4.8)
LYMPHOCYTES NFR BLD: 37.1 % (ref 18–48)
MCH RBC QN AUTO: 31.8 PG (ref 27–31)
MCHC RBC AUTO-ENTMCNC: 31.9 G/DL (ref 32–36)
MCV RBC AUTO: 100 FL (ref 82–98)
MONOCYTES # BLD AUTO: 0.1 K/UL (ref 0.3–1)
MONOCYTES NFR BLD: 1.8 % (ref 4–15)
NEUTROPHILS # BLD AUTO: 1.8 K/UL (ref 1.8–7.7)
NEUTROPHILS NFR BLD: 54.4 % (ref 38–73)
NRBC BLD-RTO: 0 /100 WBC
PLATELET # BLD AUTO: 121 K/UL (ref 150–350)
PLATELET BLD QL SMEAR: ABNORMAL
PMV BLD AUTO: 11.4 FL (ref 9.2–12.9)
POLYCHROMASIA BLD QL SMEAR: ABNORMAL
RBC # BLD AUTO: 2.17 M/UL (ref 4–5.4)
WBC # BLD AUTO: 3.29 K/UL (ref 3.9–12.7)

## 2020-11-23 PROCEDURE — 85025 COMPLETE CBC W/AUTO DIFF WBC: CPT | Mod: HCNC

## 2020-11-23 PROCEDURE — 86922 COMPATIBILITY TEST ANTIGLOB: CPT | Mod: HCNC,59

## 2020-11-23 PROCEDURE — 36415 COLL VENOUS BLD VENIPUNCTURE: CPT | Mod: HCNC

## 2020-11-23 PROCEDURE — 86901 BLOOD TYPING SEROLOGIC RH(D): CPT | Mod: HCNC

## 2020-11-23 RX ORDER — CITALOPRAM 20 MG/1
20 TABLET, FILM COATED ORAL DAILY
Qty: 30 TABLET | Refills: 2 | Status: SHIPPED | OUTPATIENT
Start: 2020-11-23 | End: 2021-02-26 | Stop reason: SDUPTHER

## 2020-11-23 RX ORDER — HYDROCODONE BITARTRATE AND ACETAMINOPHEN 500; 5 MG/1; MG/1
TABLET ORAL ONCE
Status: CANCELLED | OUTPATIENT
Start: 2020-11-23 | End: 2020-11-23

## 2020-11-23 RX ORDER — DIPHENHYDRAMINE HCL 25 MG
25 CAPSULE ORAL
Status: CANCELLED | OUTPATIENT
Start: 2020-11-23

## 2020-11-23 RX ORDER — ACETAMINOPHEN 325 MG/1
650 TABLET ORAL
Status: CANCELLED | OUTPATIENT
Start: 2020-11-23

## 2020-11-24 ENCOUNTER — INFUSION (OUTPATIENT)
Dept: INFUSION THERAPY | Facility: HOSPITAL | Age: 70
End: 2020-11-24
Payer: MEDICARE

## 2020-11-24 VITALS
TEMPERATURE: 99 F | BODY MASS INDEX: 28.4 KG/M2 | HEART RATE: 73 BPM | WEIGHT: 170.44 LBS | SYSTOLIC BLOOD PRESSURE: 155 MMHG | RESPIRATION RATE: 18 BRPM | OXYGEN SATURATION: 99 % | DIASTOLIC BLOOD PRESSURE: 70 MMHG | HEIGHT: 65 IN

## 2020-11-24 DIAGNOSIS — D64.9 ANEMIA REQUIRING TRANSFUSIONS: ICD-10-CM

## 2020-11-24 DIAGNOSIS — D46.C MDS (MYELODYSPLASTIC SYNDROME) WITH 5Q DELETION: ICD-10-CM

## 2020-11-24 DIAGNOSIS — Z13.89 SCREENING FOR MULTIPLE CONDITIONS: Primary | ICD-10-CM

## 2020-11-24 LAB — SARS-COV-2 RDRP RESP QL NAA+PROBE: NEGATIVE

## 2020-11-24 PROCEDURE — 25000003 PHARM REV CODE 250: Mod: HCNC | Performed by: INTERNAL MEDICINE

## 2020-11-24 PROCEDURE — 86902 BLOOD TYPE ANTIGEN DONOR EA: CPT | Mod: HCNC

## 2020-11-24 PROCEDURE — P9040 RBC LEUKOREDUCED IRRADIATED: HCPCS | Mod: HCNC

## 2020-11-24 PROCEDURE — U0002 COVID-19 LAB TEST NON-CDC: HCPCS | Mod: HCNC

## 2020-11-24 PROCEDURE — 36430 TRANSFUSION BLD/BLD COMPNT: CPT | Mod: HCNC

## 2020-11-24 RX ORDER — DIPHENHYDRAMINE HCL 25 MG
25 CAPSULE ORAL
Status: COMPLETED | OUTPATIENT
Start: 2020-11-24 | End: 2020-11-24

## 2020-11-24 RX ORDER — ACETAMINOPHEN 325 MG/1
650 TABLET ORAL
Status: COMPLETED | OUTPATIENT
Start: 2020-11-24 | End: 2020-11-24

## 2020-11-24 RX ORDER — HYDROCODONE BITARTRATE AND ACETAMINOPHEN 500; 5 MG/1; MG/1
TABLET ORAL ONCE
Status: COMPLETED | OUTPATIENT
Start: 2020-11-24 | End: 2020-11-24

## 2020-11-24 RX ADMIN — SODIUM CHLORIDE: 0.9 INJECTION, SOLUTION INTRAVENOUS at 01:11

## 2020-11-24 RX ADMIN — DIPHENHYDRAMINE HYDROCHLORIDE 25 MG: 25 CAPSULE ORAL at 01:11

## 2020-11-24 RX ADMIN — ACETAMINOPHEN 650 MG: 325 TABLET ORAL at 01:11

## 2020-11-24 NOTE — PLAN OF CARE
Pt received 1U PRBCs today and tolerated well, without complications. Educated patient about PRBCs (indications, side effects, possible reactions) and verbalized understanding.  VSS. ACW port positive for blood return, saline flushed, Heparin flush instilled to dwell and de accessed prior to DC. Pt DC with no distress noted, ambulated off of unit w/o event, via self, pleased. No SS of reactions.

## 2020-11-27 ENCOUNTER — LAB VISIT (OUTPATIENT)
Dept: LAB | Facility: HOSPITAL | Age: 70
End: 2020-11-27
Attending: INTERNAL MEDICINE
Payer: MEDICARE

## 2020-11-27 ENCOUNTER — PATIENT MESSAGE (OUTPATIENT)
Dept: HEMATOLOGY/ONCOLOGY | Facility: CLINIC | Age: 70
End: 2020-11-27

## 2020-11-27 DIAGNOSIS — D46.C MDS (MYELODYSPLASTIC SYNDROME) WITH 5Q DELETION: ICD-10-CM

## 2020-11-27 DIAGNOSIS — D46.9 MYELODYSPLASTIC SYNDROME: ICD-10-CM

## 2020-11-27 LAB
ABO + RH BLD: NORMAL
ANISOCYTOSIS BLD QL SMEAR: ABNORMAL
BASOPHILS # BLD AUTO: 0.02 K/UL (ref 0–0.2)
BASOPHILS NFR BLD: 0.6 % (ref 0–1.9)
BLD GP AB SCN CELLS X3 SERPL QL: NORMAL
BLD PROD TYP BPU: NORMAL
BLD PROD TYP BPU: NORMAL
BLOOD UNIT EXPIRATION DATE: NORMAL
BLOOD UNIT EXPIRATION DATE: NORMAL
BLOOD UNIT TYPE CODE: 6200
BLOOD UNIT TYPE CODE: 6200
BLOOD UNIT TYPE: NORMAL
BLOOD UNIT TYPE: NORMAL
CODING SYSTEM: NORMAL
CODING SYSTEM: NORMAL
DIFFERENTIAL METHOD: ABNORMAL
DISPENSE STATUS: NORMAL
DISPENSE STATUS: NORMAL
EOSINOPHIL # BLD AUTO: 0.2 K/UL (ref 0–0.5)
EOSINOPHIL NFR BLD: 5.2 % (ref 0–8)
ERYTHROCYTE [DISTWIDTH] IN BLOOD BY AUTOMATED COUNT: 16.6 % (ref 11.5–14.5)
HCT VFR BLD AUTO: 25.6 % (ref 37–48.5)
HGB BLD-MCNC: 8.1 G/DL (ref 12–16)
HYPOCHROMIA BLD QL SMEAR: ABNORMAL
IMM GRANULOCYTES # BLD AUTO: 0.05 K/UL (ref 0–0.04)
IMM GRANULOCYTES NFR BLD AUTO: 1.6 % (ref 0–0.5)
LYMPHOCYTES # BLD AUTO: 1.2 K/UL (ref 1–4.8)
LYMPHOCYTES NFR BLD: 38.2 % (ref 18–48)
MCH RBC QN AUTO: 30.7 PG (ref 27–31)
MCHC RBC AUTO-ENTMCNC: 31.6 G/DL (ref 32–36)
MCV RBC AUTO: 97 FL (ref 82–98)
MONOCYTES # BLD AUTO: 0 K/UL (ref 0.3–1)
MONOCYTES NFR BLD: 0.6 % (ref 4–15)
NEUTROPHILS # BLD AUTO: 1.7 K/UL (ref 1.8–7.7)
NEUTROPHILS NFR BLD: 53.8 % (ref 38–73)
NRBC BLD-RTO: 0 /100 WBC
NUM UNITS TRANS PACKED RBC: NORMAL
NUM UNITS TRANS PACKED RBC: NORMAL
PLATELET # BLD AUTO: 121 K/UL (ref 150–350)
PLATELET BLD QL SMEAR: ABNORMAL
PMV BLD AUTO: 11.3 FL (ref 9.2–12.9)
RBC # BLD AUTO: 2.64 M/UL (ref 4–5.4)
WBC # BLD AUTO: 3.09 K/UL (ref 3.9–12.7)

## 2020-11-27 PROCEDURE — 36415 COLL VENOUS BLD VENIPUNCTURE: CPT | Mod: HCNC

## 2020-11-27 PROCEDURE — 85025 COMPLETE CBC W/AUTO DIFF WBC: CPT | Mod: HCNC

## 2020-11-27 PROCEDURE — 86901 BLOOD TYPING SEROLOGIC RH(D): CPT | Mod: HCNC

## 2020-11-28 ENCOUNTER — SPECIALTY PHARMACY (OUTPATIENT)
Dept: PHARMACY | Facility: CLINIC | Age: 70
End: 2020-11-28

## 2020-11-30 ENCOUNTER — PATIENT MESSAGE (OUTPATIENT)
Dept: HEMATOLOGY/ONCOLOGY | Facility: CLINIC | Age: 70
End: 2020-11-30

## 2020-11-30 NOTE — TELEPHONE ENCOUNTER
Spoke to patient to discuss concerns. Patient has many concerns about her appointments from Friday. Allowed patient to express frustration. Patient and nurse discussed future transfusion appointment schedule. Informed patient that if she is ever feeling to unwell to come for labs, she should notify the nurse so that transfusion appointments can be cancelled. Also move lab draws to 9:30 per pt request.

## 2020-12-01 ENCOUNTER — TELEPHONE (OUTPATIENT)
Dept: HEMATOLOGY/ONCOLOGY | Facility: CLINIC | Age: 70
End: 2020-12-01

## 2020-12-01 ENCOUNTER — LAB VISIT (OUTPATIENT)
Dept: LAB | Facility: HOSPITAL | Age: 70
End: 2020-12-01
Attending: INTERNAL MEDICINE
Payer: MEDICARE

## 2020-12-01 DIAGNOSIS — D46.9 MYELODYSPLASTIC SYNDROME: ICD-10-CM

## 2020-12-01 DIAGNOSIS — D46.C MDS (MYELODYSPLASTIC SYNDROME) WITH 5Q DELETION: ICD-10-CM

## 2020-12-01 LAB
ABO + RH BLD: NORMAL
ANISOCYTOSIS BLD QL SMEAR: SLIGHT
BASOPHILS # BLD AUTO: 0.04 K/UL (ref 0–0.2)
BASOPHILS NFR BLD: 1.9 % (ref 0–1.9)
BLD GP AB SCN CELLS X3 SERPL QL: NORMAL
DIFFERENTIAL METHOD: ABNORMAL
EOSINOPHIL # BLD AUTO: 0.1 K/UL (ref 0–0.5)
EOSINOPHIL NFR BLD: 5.3 % (ref 0–8)
ERYTHROCYTE [DISTWIDTH] IN BLOOD BY AUTOMATED COUNT: 17.4 % (ref 11.5–14.5)
GIANT PLATELETS BLD QL SMEAR: PRESENT
HCT VFR BLD AUTO: 24.3 % (ref 37–48.5)
HGB BLD-MCNC: 7.7 G/DL (ref 12–16)
IMM GRANULOCYTES # BLD AUTO: 0.02 K/UL (ref 0–0.04)
IMM GRANULOCYTES NFR BLD AUTO: 1 % (ref 0–0.5)
LYMPHOCYTES # BLD AUTO: 1.1 K/UL (ref 1–4.8)
LYMPHOCYTES NFR BLD: 51.9 % (ref 18–48)
MCH RBC QN AUTO: 31 PG (ref 27–31)
MCHC RBC AUTO-ENTMCNC: 31.7 G/DL (ref 32–36)
MCV RBC AUTO: 98 FL (ref 82–98)
MONOCYTES # BLD AUTO: 0.1 K/UL (ref 0.3–1)
MONOCYTES NFR BLD: 2.4 % (ref 4–15)
NEUTROPHILS # BLD AUTO: 0.8 K/UL (ref 1.8–7.7)
NEUTROPHILS NFR BLD: 37.5 % (ref 38–73)
NRBC BLD-RTO: 0 /100 WBC
OVALOCYTES BLD QL SMEAR: ABNORMAL
PLATELET # BLD AUTO: 248 K/UL (ref 150–350)
PLATELET BLD QL SMEAR: ABNORMAL
PMV BLD AUTO: 11.8 FL (ref 9.2–12.9)
POIKILOCYTOSIS BLD QL SMEAR: SLIGHT
POLYCHROMASIA BLD QL SMEAR: ABNORMAL
RBC # BLD AUTO: 2.48 M/UL (ref 4–5.4)
WBC # BLD AUTO: 2.06 K/UL (ref 3.9–12.7)

## 2020-12-01 PROCEDURE — 36415 COLL VENOUS BLD VENIPUNCTURE: CPT | Mod: HCNC

## 2020-12-01 PROCEDURE — 86850 RBC ANTIBODY SCREEN: CPT | Mod: HCNC

## 2020-12-01 PROCEDURE — 85025 COMPLETE CBC W/AUTO DIFF WBC: CPT | Mod: HCNC

## 2020-12-01 NOTE — TELEPHONE ENCOUNTER
"----- Message from Meme Talley sent at 12/1/2020  3:39 PM CST -----  Patient Assist    Name of caller:  Susan   Contact Preference:  [phone]  Does Patient feel the need to see the MD today? No   Physician:   What is the nature of the call?    - pt called requesting to speak with nurse about test results     Additional Notes:   "Thank you for all that you do for our patients'"          "

## 2020-12-01 NOTE — TELEPHONE ENCOUNTER
Called patient to discuss labs, informed that hgb is 7.7. pt will redraw labs on Thursday to recheck levels

## 2020-12-03 ENCOUNTER — LAB VISIT (OUTPATIENT)
Dept: LAB | Facility: HOSPITAL | Age: 70
End: 2020-12-03
Attending: INTERNAL MEDICINE
Payer: MEDICARE

## 2020-12-03 ENCOUNTER — PATIENT MESSAGE (OUTPATIENT)
Dept: HEMATOLOGY/ONCOLOGY | Facility: CLINIC | Age: 70
End: 2020-12-03

## 2020-12-03 DIAGNOSIS — D46.C MDS (MYELODYSPLASTIC SYNDROME) WITH 5Q DELETION: ICD-10-CM

## 2020-12-03 DIAGNOSIS — D64.9 ANEMIA REQUIRING TRANSFUSIONS: Primary | ICD-10-CM

## 2020-12-03 DIAGNOSIS — D46.9 MYELODYSPLASTIC SYNDROME: ICD-10-CM

## 2020-12-03 LAB
ABO + RH BLD: NORMAL
ANISOCYTOSIS BLD QL SMEAR: SLIGHT
BASOPHILS NFR BLD: 4 % (ref 0–1.9)
BLD GP AB SCN CELLS X3 SERPL QL: NORMAL
DIFFERENTIAL METHOD: ABNORMAL
EOSINOPHIL NFR BLD: 6 % (ref 0–8)
ERYTHROCYTE [DISTWIDTH] IN BLOOD BY AUTOMATED COUNT: 18.1 % (ref 11.5–14.5)
HCT VFR BLD AUTO: 22.8 % (ref 37–48.5)
HGB BLD-MCNC: 7.2 G/DL (ref 12–16)
HYPOCHROMIA BLD QL SMEAR: ABNORMAL
IMM GRANULOCYTES # BLD AUTO: ABNORMAL K/UL (ref 0–0.04)
IMM GRANULOCYTES NFR BLD AUTO: ABNORMAL % (ref 0–0.5)
LYMPHOCYTES NFR BLD: 59 % (ref 18–48)
MCH RBC QN AUTO: 30.8 PG (ref 27–31)
MCHC RBC AUTO-ENTMCNC: 31.6 G/DL (ref 32–36)
MCV RBC AUTO: 97 FL (ref 82–98)
MONOCYTES NFR BLD: 4 % (ref 4–15)
NEUTROPHILS NFR BLD: 27 % (ref 38–73)
NRBC BLD-RTO: 0 /100 WBC
OVALOCYTES BLD QL SMEAR: ABNORMAL
PLATELET # BLD AUTO: 254 K/UL (ref 150–350)
PLATELET BLD QL SMEAR: ABNORMAL
PMV BLD AUTO: 11 FL (ref 9.2–12.9)
POIKILOCYTOSIS BLD QL SMEAR: SLIGHT
POLYCHROMASIA BLD QL SMEAR: ABNORMAL
RBC # BLD AUTO: 2.34 M/UL (ref 4–5.4)
WBC # BLD AUTO: 1.83 K/UL (ref 3.9–12.7)

## 2020-12-03 PROCEDURE — 85027 COMPLETE CBC AUTOMATED: CPT | Mod: HCNC

## 2020-12-03 PROCEDURE — 36415 COLL VENOUS BLD VENIPUNCTURE: CPT | Mod: HCNC

## 2020-12-03 PROCEDURE — 85007 BL SMEAR W/DIFF WBC COUNT: CPT | Mod: HCNC

## 2020-12-03 PROCEDURE — 86901 BLOOD TYPING SEROLOGIC RH(D): CPT | Mod: HCNC

## 2020-12-03 RX ORDER — DIPHENHYDRAMINE HCL 25 MG
25 CAPSULE ORAL
Status: CANCELLED | OUTPATIENT
Start: 2020-12-03

## 2020-12-03 RX ORDER — ONDANSETRON 4 MG/1
8 TABLET, ORALLY DISINTEGRATING ORAL ONCE
Status: CANCELLED | OUTPATIENT
Start: 2020-12-07

## 2020-12-03 RX ORDER — AZACITIDINE 100 MG/1
75 INJECTION, POWDER, LYOPHILIZED, FOR SOLUTION INTRAVENOUS; SUBCUTANEOUS
Status: CANCELLED | OUTPATIENT
Start: 2020-12-10

## 2020-12-03 RX ORDER — ONDANSETRON 4 MG/1
8 TABLET, ORALLY DISINTEGRATING ORAL ONCE
Status: CANCELLED | OUTPATIENT
Start: 2020-12-11

## 2020-12-03 RX ORDER — AZACITIDINE 100 MG/1
75 INJECTION, POWDER, LYOPHILIZED, FOR SOLUTION INTRAVENOUS; SUBCUTANEOUS
Status: CANCELLED | OUTPATIENT
Start: 2020-12-07

## 2020-12-03 RX ORDER — ACETAMINOPHEN 325 MG/1
650 TABLET ORAL
Status: CANCELLED | OUTPATIENT
Start: 2020-12-03

## 2020-12-03 RX ORDER — ONDANSETRON 4 MG/1
8 TABLET, ORALLY DISINTEGRATING ORAL ONCE
Status: CANCELLED | OUTPATIENT
Start: 2020-12-09

## 2020-12-03 RX ORDER — ONDANSETRON 4 MG/1
8 TABLET, ORALLY DISINTEGRATING ORAL ONCE
Status: CANCELLED | OUTPATIENT
Start: 2020-12-08

## 2020-12-03 RX ORDER — AZACITIDINE 100 MG/1
75 INJECTION, POWDER, LYOPHILIZED, FOR SOLUTION INTRAVENOUS; SUBCUTANEOUS
Status: CANCELLED | OUTPATIENT
Start: 2020-12-11

## 2020-12-03 RX ORDER — AZACITIDINE 100 MG/1
75 INJECTION, POWDER, LYOPHILIZED, FOR SOLUTION INTRAVENOUS; SUBCUTANEOUS
Status: CANCELLED | OUTPATIENT
Start: 2020-12-09

## 2020-12-03 RX ORDER — ONDANSETRON 4 MG/1
8 TABLET, ORALLY DISINTEGRATING ORAL ONCE
Status: CANCELLED | OUTPATIENT
Start: 2020-12-10

## 2020-12-03 RX ORDER — AZACITIDINE 100 MG/1
75 INJECTION, POWDER, LYOPHILIZED, FOR SOLUTION INTRAVENOUS; SUBCUTANEOUS
Status: CANCELLED | OUTPATIENT
Start: 2020-12-08

## 2020-12-03 RX ORDER — HYDROCODONE BITARTRATE AND ACETAMINOPHEN 500; 5 MG/1; MG/1
TABLET ORAL ONCE
Status: CANCELLED | OUTPATIENT
Start: 2020-12-03 | End: 2020-12-03

## 2020-12-04 ENCOUNTER — INFUSION (OUTPATIENT)
Dept: INFUSION THERAPY | Facility: HOSPITAL | Age: 70
End: 2020-12-04
Payer: MEDICARE

## 2020-12-04 VITALS
WEIGHT: 170.44 LBS | BODY MASS INDEX: 28.4 KG/M2 | HEIGHT: 65 IN | SYSTOLIC BLOOD PRESSURE: 190 MMHG | DIASTOLIC BLOOD PRESSURE: 74 MMHG | TEMPERATURE: 98 F | RESPIRATION RATE: 18 BRPM | HEART RATE: 72 BPM

## 2020-12-04 DIAGNOSIS — D64.9 ANEMIA REQUIRING TRANSFUSIONS: ICD-10-CM

## 2020-12-04 DIAGNOSIS — D46.C MDS (MYELODYSPLASTIC SYNDROME) WITH 5Q DELETION: ICD-10-CM

## 2020-12-04 PROCEDURE — 25000003 PHARM REV CODE 250: Mod: HCNC | Performed by: INTERNAL MEDICINE

## 2020-12-04 PROCEDURE — P9040 RBC LEUKOREDUCED IRRADIATED: HCPCS | Mod: HCNC

## 2020-12-04 PROCEDURE — 86922 COMPATIBILITY TEST ANTIGLOB: CPT | Mod: HCNC

## 2020-12-04 PROCEDURE — 36430 TRANSFUSION BLD/BLD COMPNT: CPT | Mod: HCNC

## 2020-12-04 RX ORDER — DIPHENHYDRAMINE HCL 25 MG
25 CAPSULE ORAL
Status: COMPLETED | OUTPATIENT
Start: 2020-12-04 | End: 2020-12-04

## 2020-12-04 RX ORDER — HYDROCODONE BITARTRATE AND ACETAMINOPHEN 500; 5 MG/1; MG/1
TABLET ORAL ONCE
Status: COMPLETED | OUTPATIENT
Start: 2020-12-04 | End: 2020-12-04

## 2020-12-04 RX ORDER — ACETAMINOPHEN 325 MG/1
650 TABLET ORAL
Status: COMPLETED | OUTPATIENT
Start: 2020-12-04 | End: 2020-12-04

## 2020-12-04 RX ADMIN — SODIUM CHLORIDE: 0.9 INJECTION, SOLUTION INTRAVENOUS at 02:12

## 2020-12-04 RX ADMIN — DIPHENHYDRAMINE HYDROCHLORIDE 25 MG: 25 CAPSULE ORAL at 02:12

## 2020-12-04 RX ADMIN — ACETAMINOPHEN 650 MG: 325 TABLET ORAL at 02:12

## 2020-12-07 ENCOUNTER — TELEPHONE (OUTPATIENT)
Dept: HEMATOLOGY/ONCOLOGY | Facility: CLINIC | Age: 70
End: 2020-12-07

## 2020-12-07 ENCOUNTER — OFFICE VISIT (OUTPATIENT)
Dept: HEMATOLOGY/ONCOLOGY | Facility: CLINIC | Age: 70
End: 2020-12-07
Payer: MEDICARE

## 2020-12-07 ENCOUNTER — INFUSION (OUTPATIENT)
Dept: INFUSION THERAPY | Facility: HOSPITAL | Age: 70
End: 2020-12-07
Attending: INTERNAL MEDICINE
Payer: MEDICARE

## 2020-12-07 VITALS
HEART RATE: 81 BPM | WEIGHT: 177 LBS | SYSTOLIC BLOOD PRESSURE: 139 MMHG | DIASTOLIC BLOOD PRESSURE: 65 MMHG | TEMPERATURE: 98 F | BODY MASS INDEX: 29.49 KG/M2 | RESPIRATION RATE: 20 BRPM | HEIGHT: 65 IN | OXYGEN SATURATION: 97 %

## 2020-12-07 DIAGNOSIS — N17.9 AKI (ACUTE KIDNEY INJURY): ICD-10-CM

## 2020-12-07 DIAGNOSIS — D46.C MDS (MYELODYSPLASTIC SYNDROME) WITH 5Q DELETION: Primary | ICD-10-CM

## 2020-12-07 DIAGNOSIS — E11.22 CONTROLLED TYPE 2 DIABETES MELLITUS WITH STAGE 3 CHRONIC KIDNEY DISEASE, WITHOUT LONG-TERM CURRENT USE OF INSULIN: ICD-10-CM

## 2020-12-07 DIAGNOSIS — E87.6 HYPOKALEMIA: ICD-10-CM

## 2020-12-07 DIAGNOSIS — F43.23 ADJUSTMENT DISORDER WITH MIXED ANXIETY AND DEPRESSED MOOD: ICD-10-CM

## 2020-12-07 DIAGNOSIS — M79.10 MYALGIA: ICD-10-CM

## 2020-12-07 DIAGNOSIS — D70.8 OTHER NEUTROPENIA: ICD-10-CM

## 2020-12-07 DIAGNOSIS — N18.30 CONTROLLED TYPE 2 DIABETES MELLITUS WITH STAGE 3 CHRONIC KIDNEY DISEASE, WITHOUT LONG-TERM CURRENT USE OF INSULIN: ICD-10-CM

## 2020-12-07 DIAGNOSIS — I25.10 CORONARY ARTERY DISEASE INVOLVING NATIVE CORONARY ARTERY OF NATIVE HEART WITHOUT ANGINA PECTORIS: ICD-10-CM

## 2020-12-07 DIAGNOSIS — D63.0 ANEMIA IN NEOPLASTIC DISEASE: ICD-10-CM

## 2020-12-07 DIAGNOSIS — G47.00 INSOMNIA, UNSPECIFIED TYPE: ICD-10-CM

## 2020-12-07 DIAGNOSIS — I10 ESSENTIAL HYPERTENSION: ICD-10-CM

## 2020-12-07 PROCEDURE — 63600175 PHARM REV CODE 636 W HCPCS: Mod: JG,HCNC | Performed by: INTERNAL MEDICINE

## 2020-12-07 PROCEDURE — 1101F PR PT FALLS ASSESS DOC 0-1 FALLS W/OUT INJ PAST YR: ICD-10-PCS | Mod: HCNC,CPTII,S$GLB, | Performed by: NURSE PRACTITIONER

## 2020-12-07 PROCEDURE — 1125F PR PAIN SEVERITY QUANTIFIED, PAIN PRESENT: ICD-10-PCS | Mod: HCNC,S$GLB,, | Performed by: NURSE PRACTITIONER

## 2020-12-07 PROCEDURE — 99999 PR PBB SHADOW E&M-EST. PATIENT-LVL V: ICD-10-PCS | Mod: PBBFAC,HCNC,, | Performed by: NURSE PRACTITIONER

## 2020-12-07 PROCEDURE — 3078F DIAST BP <80 MM HG: CPT | Mod: HCNC,CPTII,S$GLB, | Performed by: NURSE PRACTITIONER

## 2020-12-07 PROCEDURE — 3075F SYST BP GE 130 - 139MM HG: CPT | Mod: HCNC,CPTII,S$GLB, | Performed by: NURSE PRACTITIONER

## 2020-12-07 PROCEDURE — 1125F AMNT PAIN NOTED PAIN PRSNT: CPT | Mod: HCNC,S$GLB,, | Performed by: NURSE PRACTITIONER

## 2020-12-07 PROCEDURE — 1101F PT FALLS ASSESS-DOCD LE1/YR: CPT | Mod: HCNC,CPTII,S$GLB, | Performed by: NURSE PRACTITIONER

## 2020-12-07 PROCEDURE — 1157F PR ADVANCE CARE PLAN OR EQUIV PRESENT IN MEDICAL RECORD: ICD-10-PCS | Mod: HCNC,S$GLB,, | Performed by: NURSE PRACTITIONER

## 2020-12-07 PROCEDURE — 99215 OFFICE O/P EST HI 40 MIN: CPT | Mod: HCNC,S$GLB,, | Performed by: NURSE PRACTITIONER

## 2020-12-07 PROCEDURE — 3008F BODY MASS INDEX DOCD: CPT | Mod: HCNC,CPTII,S$GLB, | Performed by: NURSE PRACTITIONER

## 2020-12-07 PROCEDURE — 96401 CHEMO ANTI-NEOPL SQ/IM: CPT | Mod: HCNC

## 2020-12-07 PROCEDURE — 99999 PR PBB SHADOW E&M-EST. PATIENT-LVL V: CPT | Mod: PBBFAC,HCNC,, | Performed by: NURSE PRACTITIONER

## 2020-12-07 PROCEDURE — 1159F MED LIST DOCD IN RCRD: CPT | Mod: HCNC,S$GLB,, | Performed by: NURSE PRACTITIONER

## 2020-12-07 PROCEDURE — 3044F HG A1C LEVEL LT 7.0%: CPT | Mod: HCNC,CPTII,S$GLB, | Performed by: NURSE PRACTITIONER

## 2020-12-07 PROCEDURE — 3044F PR MOST RECENT HEMOGLOBIN A1C LEVEL <7.0%: ICD-10-PCS | Mod: HCNC,CPTII,S$GLB, | Performed by: NURSE PRACTITIONER

## 2020-12-07 PROCEDURE — 99499 UNLISTED E&M SERVICE: CPT | Mod: S$PBB,,, | Performed by: NURSE PRACTITIONER

## 2020-12-07 PROCEDURE — 1157F ADVNC CARE PLAN IN RCRD: CPT | Mod: HCNC,S$GLB,, | Performed by: NURSE PRACTITIONER

## 2020-12-07 PROCEDURE — 99215 PR OFFICE/OUTPT VISIT, EST, LEVL V, 40-54 MIN: ICD-10-PCS | Mod: HCNC,S$GLB,, | Performed by: NURSE PRACTITIONER

## 2020-12-07 PROCEDURE — 1159F PR MEDICATION LIST DOCUMENTED IN MEDICAL RECORD: ICD-10-PCS | Mod: HCNC,S$GLB,, | Performed by: NURSE PRACTITIONER

## 2020-12-07 PROCEDURE — 3288F PR FALLS RISK ASSESSMENT DOCUMENTED: ICD-10-PCS | Mod: HCNC,CPTII,S$GLB, | Performed by: NURSE PRACTITIONER

## 2020-12-07 PROCEDURE — 3078F PR MOST RECENT DIASTOLIC BLOOD PRESSURE < 80 MM HG: ICD-10-PCS | Mod: HCNC,CPTII,S$GLB, | Performed by: NURSE PRACTITIONER

## 2020-12-07 PROCEDURE — 99499 RISK ADDL DX/OHS AUDIT: ICD-10-PCS | Mod: S$PBB,,, | Performed by: NURSE PRACTITIONER

## 2020-12-07 PROCEDURE — 3075F PR MOST RECENT SYSTOLIC BLOOD PRESS GE 130-139MM HG: ICD-10-PCS | Mod: HCNC,CPTII,S$GLB, | Performed by: NURSE PRACTITIONER

## 2020-12-07 PROCEDURE — 3008F PR BODY MASS INDEX (BMI) DOCUMENTED: ICD-10-PCS | Mod: HCNC,CPTII,S$GLB, | Performed by: NURSE PRACTITIONER

## 2020-12-07 PROCEDURE — 3288F FALL RISK ASSESSMENT DOCD: CPT | Mod: HCNC,CPTII,S$GLB, | Performed by: NURSE PRACTITIONER

## 2020-12-07 RX ORDER — AZACITIDINE 100 MG/1
75 INJECTION, POWDER, LYOPHILIZED, FOR SOLUTION INTRAVENOUS; SUBCUTANEOUS
Status: COMPLETED | OUTPATIENT
Start: 2020-12-07 | End: 2020-12-07

## 2020-12-07 RX ORDER — ONDANSETRON 4 MG/1
8 TABLET, ORALLY DISINTEGRATING ORAL ONCE
Status: DISCONTINUED | OUTPATIENT
Start: 2020-12-07 | End: 2020-12-07 | Stop reason: HOSPADM

## 2020-12-07 RX ADMIN — AZACITIDINE 145 MG: 100 INJECTION, POWDER, LYOPHILIZED, FOR SOLUTION INTRAVENOUS; SUBCUTANEOUS at 10:12

## 2020-12-07 NOTE — PROGRESS NOTES
Subjective:       Patient ID: Susan Puente is a 70 y.o. female.    Chief Complaint: Schedule return visit with CBC, CMP,T&S, and appt with  and Shoulder Pain (Both Shoulders (Mostly in Rt)-Arthritis)    HPI: Ms. Puente presents todayfor follow-up for MDS 5 q del syndrome prior to cycle 21 of Vidaza as third line therapy for 5q minus syndrome. Continues to be intermittently transfusion-dependent. Continues antimicrobials ppx for neutropenia. Today, she denies fevers, chills, sob, cough, bruising or bleeding, chest pain, and n/v/d/c. She continues to feel fatigued and struggles with mobility issues. She is using a rollator. States her appetite is great, she enjoyed her Thanksgiving. Her biggest issue continues to be insomnia. She has chronically declined any medications for this.    Oncology History   Ms. Puente is a 68 year old female with hx of DM2, peripheral vascular disease, tobacco use, CAD, hyperlipidemia, hypertension who was hospitalized 11/10/16 for anemia. hgb 5.3  MCH 43.4 with normal WBC and platelets. Patient had normal iron stores. She was transfused 3 units of PRBCs and discharged home with hgb 8.7 on 11/11/16. She was referred for further evalutation of her anemia. On 12/16/16 patient had a normal SPEP and immunofixation. Slightly elevated kappa light chains with normal ration. Elevated vitamin b12, normal folate, JAYDEN was positive with a low titer and negative profile. Patient had a bone marrow biopsy 1/5/16 which showed the core biopsy is normocellular for age (40%); however, megakaryocytes are increased and show frequent small, hypolobated forms. Additionally, a subset of the neutrophils are hypogranular. Blasts are not increased by either morphology (1.2%) or in the corresponding flow cytometric analysis. Fish detects a 5q deletion in 54.5% of nuclei. Cytogenetics reported 20 metaphases, 2 metaphases were normal and 18 metaphases had a 5q deletion. No additional  cytogenetic abnormalities were detected. Findings consistent with 5q deletion syndrome.     Patient had a delay in obtaining Revlimid 10 mg daily but did start taking the medication 2/8/17. On 2/9/17 patient developed a diffuse maculopapular rash throughout scalp arms, legs and torso. She states she had no stridor or wheezing. She discontinued medication 2/15/17. Went to allergist who provided references on desensitization so that patient could resume Revlimid. Unfortunately developed pancytopenia and Revlimid stopped 6/16/17. She had a repeat BMBX  performed 6/8/17 and showed a hypercellular marrow (60%) continued atypia in the granulocytes and megakaryocytes were noted.  Additionally, there is erythroid atypia present. No increase in blasts. Cytogenetics are normal and MDS FISH is negative, failing to show 5 q minus. NGS should no significant molecular mutations.  Anemia work up revealed bienvenido negative hemolysis.    Revlimid has been stopped since June 2017 after she developed pancytopenia while on Revlimid (required desensitization). CBC was normal for almost 1 year and marrow was negative for del5q. Bone marrow biopsy repeat from 6/2018 showed relapsed 5q minus MDS without new or additional mutations. Revlimid restarted at 2.5 mg daily with prednisone to prevent allergic reaction. Titrated to a goal of 10mg daily Revlimid. Hospital admission 3/12-3/17/19 for unresponsive event after blood transfusion, found to be profoundly pancytopenic at admission. Since hospital admission Revlimid has been stopped. Repeat marrow March 2019 shows persistent MDS with 5q minus.     Started cycle 1 Vidaza 5/6/19 subq for 5 days every 28 days. Restaging bone marrow biopsy from 10/24/19 shows persistent MDS, no excess blasts and 3 of 20 metaphases with 5q deletion.    Review of Systems   Constitutional: Negative for fever, chills, weight loss, fatigue.   HENT: Negative for sinus congestion or rhinorrhea. Negative for ear pain,  mouth sores, nosebleeds and trouble swallowing.    Respiratory: Negative for cough, shortness of breath and wheezing.    Cardiovascular: Negative for chest pain and leg swelling.   Gastrointestinal: Negative for abdominal distention, abdominal pain, blood in stool, nausea and vomiting.   Endocrine: Negative for polyphagia and polyuria.   Genitourinary: Negative for dysuria, hematuria and urgency.   Musculoskeletal: Positive for sciatic pain much improved with gabapentin. Right shoulder pain and limited ROM.   Skin: Negative for color change, pallor and rash.   Neurological: Negative for dizziness, weakness, light-headedness and headaches.   Hematological: Negative for adenopathy. Does not bruise/bleed easily.   Psychiatric/Behavioral: Negative for agitation and behavioral problems.       Objective:      Physical Exam   Constitutional: She is oriented to person, place, and time. She appears well-developed and well-nourished.   Mouth/Throat: Oropharynx is clear and moist. No oropharyngeal exudate.   Eyes: Conjunctivae and EOM are normal. Right eye exhibits no discharge. Left eye exhibits no discharge.   Neck: Normal range of motion. Neck supple.   Cardiovascular: Normal rate, regular rhythm, normal heart sounds and intact distal pulses.   No murmur heard.  Pulmonary/Chest: Effort normal and breath sounds normal. No respiratory distress. She has no wheezes.   Abdominal: Soft. Bowel sounds are normal. She exhibits no distension and no mass. There is no tenderness.   Musculoskeletal: Normal range of motion. She exhibits no edema.   Neurological: She is alert and oriented to person, place, and time.   Skin: Skin is warm and dry. No rash noted.   Psychiatric: She has a normal mood and affect. Her behavior is normal. Judgment and thought content normal.   Nursing note and vitals reviewed.      Assessment:       1. 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 Other neutropenia     4. Controlled type 2 diabetes mellitus with stage 3 chronic kidney disease, without long-term current use of insulin    5. Essential hypertension    6. Coronary artery disease involving native coronary artery of native heart without angina pectoris    7. Adjustment disorder with mixed anxiety and depressed mood    8. Myalgia    9. Insomnia, unspecified type    10. NOEL (acute kidney injury)        Plan:     MDS (per Dr. Valdes's plan)  - Initially with 5 q minus syndrome and treated with Revlimid. Developed allergy and was then desensitized. Soon after developed pancytopenia and Revlimid held since June 2017.    - BMBX on 6/8/17 was with normal cytogenetics. 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 Discussed treatment options of HMA therapy or repeat trial of Revlimid and patient wished to proceed with Revlimid   - Revlimid stopped again due to repeat allergic reaction and pancytopenia 3/2019  - Started cycle 1 Vidaza 5/6/19 subq for 5 days every 28 days  - Restaging bone marrow biopsy following cycle 5 from 10/24/19 shows persistent MDS, no excess blasts and 3 of 20 metaphases with 5q deletion  - Tolerated cycles 1-19 well. Package insert for Vidaza recommends holding if ANC <1000, however pt has been receiving this with an ANC below 1000 for each cycle. Okay to continue to give despite neutropenia. -Desired repeat bone marrow biopsy Spring 2020, unable to perform with sedation due to COVID19  - unless change in CBC warrants earlier procedure, patient is awaiting procedure with sedation  - CBC is stable  - Will proceed with C21 on Vidaza today    Cytopenias 2/2 disease: anemia and neutropenia  - Transfuse for hgb < 7 or plts < 10K; no need for transfusion tomorrow; appt canceled  - Continue ppx cipro, acyclovir, and fluconazole  -     DM2  - management per PCP  - continue metformin    HTN  - management per cardiologis  - continue lisinopril-HCTZ and coreg    CAD  - continue Repatha for HLD per PCP  (intolerant to statins)  - Not taking ASA now    Adjustment disorder  - Improved mood on Celexa. Continue    Myalgias/possible sciatica  - started trial of gabapentin 100 mg bid 11/4/19, much improved; now only taking gabapentin once a day in AM  - discussed to return to [facility] for water aerobics. See mood.    Insomnia  - using 10mg melatonin OTC or tylenol PM  - rarely using children's benadryl    NOEL  - creatinine up to 1.3 today  - encouraged increased PO fluid intake; will repeat CMP with labs in 1 week on 12/14      Follow up:  - Please schedule CBC and type and screen twice weekly on Mondays and Thursdays for possible transfusions on Tuesday& Fridays through month of January  - Schedule return visit with CBC, CMP, t&s, and appt with Dr. Valdes or NP 1/4/21  - Schedule chemo chair for cycle 22 of vidaza 1/4-1/8/21      Eden Salmeron NP  Hematology/Oncology/BMT

## 2020-12-07 NOTE — Clinical Note
- Please schedule CBC and type and screen twice weekly on Mondays and Thursdays for possible transfusions on Tuesday& Fridays through month of January  - Schedule return visit with CBC, CMP, t&s, and appt with Dr. Valdes or NP 1/4/21  - Schedule chemo chair for cycle 22 of vidaza 1/4-1/8/21

## 2020-12-08 ENCOUNTER — INFUSION (OUTPATIENT)
Dept: INFUSION THERAPY | Facility: HOSPITAL | Age: 70
End: 2020-12-08
Attending: INTERNAL MEDICINE
Payer: MEDICARE

## 2020-12-08 ENCOUNTER — PATIENT MESSAGE (OUTPATIENT)
Dept: INTERNAL MEDICINE | Facility: CLINIC | Age: 70
End: 2020-12-08

## 2020-12-08 VITALS — DIASTOLIC BLOOD PRESSURE: 62 MMHG | HEART RATE: 68 BPM | SYSTOLIC BLOOD PRESSURE: 132 MMHG | RESPIRATION RATE: 20 BRPM

## 2020-12-08 DIAGNOSIS — D46.C MDS (MYELODYSPLASTIC SYNDROME) WITH 5Q DELETION: Primary | ICD-10-CM

## 2020-12-08 DIAGNOSIS — I25.10 CORONARY ARTERY DISEASE, ANGINA PRESENCE UNSPECIFIED, UNSPECIFIED VESSEL OR LESION TYPE, UNSPECIFIED WHETHER NATIVE OR TRANSPLANTED HEART: ICD-10-CM

## 2020-12-08 DIAGNOSIS — E87.6 HYPOKALEMIA: ICD-10-CM

## 2020-12-08 PROCEDURE — 96401 CHEMO ANTI-NEOPL SQ/IM: CPT | Mod: HCNC

## 2020-12-08 PROCEDURE — 63600175 PHARM REV CODE 636 W HCPCS: Mod: JG,HCNC | Performed by: INTERNAL MEDICINE

## 2020-12-08 RX ORDER — ONDANSETRON 4 MG/1
8 TABLET, ORALLY DISINTEGRATING ORAL ONCE
Status: DISCONTINUED | OUTPATIENT
Start: 2020-12-08 | End: 2020-12-08 | Stop reason: HOSPADM

## 2020-12-08 RX ORDER — AZACITIDINE 100 MG/1
75 INJECTION, POWDER, LYOPHILIZED, FOR SOLUTION INTRAVENOUS; SUBCUTANEOUS
Status: COMPLETED | OUTPATIENT
Start: 2020-12-08 | End: 2020-12-08

## 2020-12-08 RX ORDER — LISINOPRIL AND HYDROCHLOROTHIAZIDE 12.5; 2 MG/1; MG/1
2 TABLET ORAL DAILY
Qty: 180 TABLET | Refills: 3 | Status: SHIPPED | OUTPATIENT
Start: 2020-12-08 | End: 2021-04-05 | Stop reason: SDUPTHER

## 2020-12-08 RX ADMIN — AZACITIDINE 145 MG: 100 INJECTION, POWDER, LYOPHILIZED, FOR SOLUTION INTRAVENOUS; SUBCUTANEOUS at 10:12

## 2020-12-08 NOTE — NURSING
Pt arrived using her rolling walker for vidaza D2.  Pt tolerated injections SQ x3 to LLA.  Discharged to home and will RTC tomorrow.

## 2020-12-09 ENCOUNTER — INFUSION (OUTPATIENT)
Dept: INFUSION THERAPY | Facility: HOSPITAL | Age: 70
End: 2020-12-09
Attending: INTERNAL MEDICINE
Payer: MEDICARE

## 2020-12-09 ENCOUNTER — PATIENT MESSAGE (OUTPATIENT)
Dept: HEMATOLOGY/ONCOLOGY | Facility: CLINIC | Age: 70
End: 2020-12-09

## 2020-12-09 VITALS
DIASTOLIC BLOOD PRESSURE: 65 MMHG | TEMPERATURE: 98 F | RESPIRATION RATE: 18 BRPM | HEART RATE: 72 BPM | SYSTOLIC BLOOD PRESSURE: 139 MMHG

## 2020-12-09 DIAGNOSIS — D46.C MDS (MYELODYSPLASTIC SYNDROME) WITH 5Q DELETION: Primary | ICD-10-CM

## 2020-12-09 DIAGNOSIS — E87.6 HYPOKALEMIA: ICD-10-CM

## 2020-12-09 PROCEDURE — 63600175 PHARM REV CODE 636 W HCPCS: Mod: JG,HCNC | Performed by: INTERNAL MEDICINE

## 2020-12-09 PROCEDURE — 96401 CHEMO ANTI-NEOPL SQ/IM: CPT | Mod: HCNC

## 2020-12-09 RX ORDER — AZACITIDINE 100 MG/1
75 INJECTION, POWDER, LYOPHILIZED, FOR SOLUTION INTRAVENOUS; SUBCUTANEOUS
Status: COMPLETED | OUTPATIENT
Start: 2020-12-09 | End: 2020-12-09

## 2020-12-09 RX ORDER — ONDANSETRON 4 MG/1
8 TABLET, ORALLY DISINTEGRATING ORAL ONCE
Status: DISCONTINUED | OUTPATIENT
Start: 2020-12-09 | End: 2020-12-09 | Stop reason: HOSPADM

## 2020-12-09 RX ADMIN — AZACITIDINE 145 MG: 100 INJECTION, POWDER, LYOPHILIZED, FOR SOLUTION INTRAVENOUS; SUBCUTANEOUS at 10:12

## 2020-12-10 ENCOUNTER — INFUSION (OUTPATIENT)
Dept: INFUSION THERAPY | Facility: HOSPITAL | Age: 70
End: 2020-12-10
Attending: INTERNAL MEDICINE
Payer: MEDICARE

## 2020-12-10 VITALS
SYSTOLIC BLOOD PRESSURE: 143 MMHG | TEMPERATURE: 98 F | HEART RATE: 73 BPM | DIASTOLIC BLOOD PRESSURE: 63 MMHG | RESPIRATION RATE: 17 BRPM

## 2020-12-10 DIAGNOSIS — D46.C MDS (MYELODYSPLASTIC SYNDROME) WITH 5Q DELETION: Primary | ICD-10-CM

## 2020-12-10 PROCEDURE — 63600175 PHARM REV CODE 636 W HCPCS: Mod: JG,HCNC | Performed by: INTERNAL MEDICINE

## 2020-12-10 PROCEDURE — 96401 CHEMO ANTI-NEOPL SQ/IM: CPT | Mod: HCNC

## 2020-12-10 RX ORDER — AZACITIDINE 100 MG/1
75 INJECTION, POWDER, LYOPHILIZED, FOR SOLUTION INTRAVENOUS; SUBCUTANEOUS
Status: COMPLETED | OUTPATIENT
Start: 2020-12-10 | End: 2020-12-10

## 2020-12-10 RX ADMIN — AZACITIDINE 145 MG: 100 INJECTION, POWDER, LYOPHILIZED, FOR SOLUTION INTRAVENOUS; SUBCUTANEOUS at 10:12

## 2020-12-11 ENCOUNTER — INFUSION (OUTPATIENT)
Dept: INFUSION THERAPY | Facility: HOSPITAL | Age: 70
End: 2020-12-11
Attending: INTERNAL MEDICINE
Payer: MEDICARE

## 2020-12-11 ENCOUNTER — PATIENT MESSAGE (OUTPATIENT)
Dept: OTHER | Facility: OTHER | Age: 70
End: 2020-12-11

## 2020-12-11 VITALS
SYSTOLIC BLOOD PRESSURE: 147 MMHG | TEMPERATURE: 98 F | DIASTOLIC BLOOD PRESSURE: 65 MMHG | RESPIRATION RATE: 17 BRPM | HEART RATE: 69 BPM

## 2020-12-11 DIAGNOSIS — D46.C MDS (MYELODYSPLASTIC SYNDROME) WITH 5Q DELETION: Primary | ICD-10-CM

## 2020-12-11 PROCEDURE — 63600175 PHARM REV CODE 636 W HCPCS: Mod: JG,HCNC | Performed by: INTERNAL MEDICINE

## 2020-12-11 PROCEDURE — 96401 CHEMO ANTI-NEOPL SQ/IM: CPT | Mod: HCNC

## 2020-12-11 RX ORDER — AZACITIDINE 100 MG/1
75 INJECTION, POWDER, LYOPHILIZED, FOR SOLUTION INTRAVENOUS; SUBCUTANEOUS
Status: COMPLETED | OUTPATIENT
Start: 2020-12-11 | End: 2020-12-11

## 2020-12-11 RX ADMIN — AZACITIDINE 145 MG: 100 INJECTION, POWDER, LYOPHILIZED, FOR SOLUTION SUBCUTANEOUS at 10:12

## 2020-12-14 ENCOUNTER — PATIENT MESSAGE (OUTPATIENT)
Dept: HEMATOLOGY/ONCOLOGY | Facility: CLINIC | Age: 70
End: 2020-12-14

## 2020-12-14 ENCOUNTER — LAB VISIT (OUTPATIENT)
Dept: LAB | Facility: HOSPITAL | Age: 70
End: 2020-12-14
Attending: INTERNAL MEDICINE
Payer: MEDICARE

## 2020-12-14 DIAGNOSIS — D46.C MDS (MYELODYSPLASTIC SYNDROME) WITH 5Q DELETION: ICD-10-CM

## 2020-12-14 DIAGNOSIS — D46.9 MYELODYSPLASTIC SYNDROME: ICD-10-CM

## 2020-12-14 LAB
ABO + RH BLD: NORMAL
ALBUMIN SERPL BCP-MCNC: 3.7 G/DL (ref 3.5–5.2)
ALP SERPL-CCNC: 62 U/L (ref 55–135)
ALT SERPL W/O P-5'-P-CCNC: 78 U/L (ref 10–44)
ANION GAP SERPL CALC-SCNC: 10 MMOL/L (ref 8–16)
ANISOCYTOSIS BLD QL SMEAR: SLIGHT
AST SERPL-CCNC: 41 U/L (ref 10–40)
BASOPHILS # BLD AUTO: 0.02 K/UL (ref 0–0.2)
BASOPHILS NFR BLD: 0.7 % (ref 0–1.9)
BILIRUB SERPL-MCNC: 0.3 MG/DL (ref 0.1–1)
BLD GP AB SCN CELLS X3 SERPL QL: NORMAL
BUN SERPL-MCNC: 25 MG/DL (ref 8–23)
CALCIUM SERPL-MCNC: 9.4 MG/DL (ref 8.7–10.5)
CHLORIDE SERPL-SCNC: 101 MMOL/L (ref 95–110)
CO2 SERPL-SCNC: 28 MMOL/L (ref 23–29)
CREAT SERPL-MCNC: 1.3 MG/DL (ref 0.5–1.4)
DIFFERENTIAL METHOD: ABNORMAL
EOSINOPHIL # BLD AUTO: 0.1 K/UL (ref 0–0.5)
EOSINOPHIL NFR BLD: 4.5 % (ref 0–8)
ERYTHROCYTE [DISTWIDTH] IN BLOOD BY AUTOMATED COUNT: 17.2 % (ref 11.5–14.5)
EST. GFR  (AFRICAN AMERICAN): 48 ML/MIN/1.73 M^2
EST. GFR  (NON AFRICAN AMERICAN): 41.7 ML/MIN/1.73 M^2
GLUCOSE SERPL-MCNC: 129 MG/DL (ref 70–110)
HCT VFR BLD AUTO: 22.3 % (ref 37–48.5)
HGB BLD-MCNC: 7.2 G/DL (ref 12–16)
IMM GRANULOCYTES # BLD AUTO: 0.03 K/UL (ref 0–0.04)
IMM GRANULOCYTES NFR BLD AUTO: 1 % (ref 0–0.5)
LYMPHOCYTES # BLD AUTO: 1.1 K/UL (ref 1–4.8)
LYMPHOCYTES NFR BLD: 37.5 % (ref 18–48)
MCH RBC QN AUTO: 31 PG (ref 27–31)
MCHC RBC AUTO-ENTMCNC: 32.3 G/DL (ref 32–36)
MCV RBC AUTO: 96 FL (ref 82–98)
MONOCYTES # BLD AUTO: 0.1 K/UL (ref 0.3–1)
MONOCYTES NFR BLD: 2.7 % (ref 4–15)
NEUTROPHILS # BLD AUTO: 1.6 K/UL (ref 1.8–7.7)
NEUTROPHILS NFR BLD: 53.6 % (ref 38–73)
NRBC BLD-RTO: 0 /100 WBC
OVALOCYTES BLD QL SMEAR: ABNORMAL
PLATELET # BLD AUTO: 186 K/UL (ref 150–350)
PLATELET BLD QL SMEAR: ABNORMAL
PMV BLD AUTO: 10.9 FL (ref 9.2–12.9)
POIKILOCYTOSIS BLD QL SMEAR: SLIGHT
POTASSIUM SERPL-SCNC: 4.1 MMOL/L (ref 3.5–5.1)
PROT SERPL-MCNC: 6.9 G/DL (ref 6–8.4)
RBC # BLD AUTO: 2.32 M/UL (ref 4–5.4)
SODIUM SERPL-SCNC: 139 MMOL/L (ref 136–145)
WBC # BLD AUTO: 2.91 K/UL (ref 3.9–12.7)

## 2020-12-14 PROCEDURE — 80053 COMPREHEN METABOLIC PANEL: CPT | Mod: HCNC

## 2020-12-14 PROCEDURE — 36415 COLL VENOUS BLD VENIPUNCTURE: CPT | Mod: HCNC

## 2020-12-14 PROCEDURE — 85025 COMPLETE CBC W/AUTO DIFF WBC: CPT | Mod: HCNC

## 2020-12-14 PROCEDURE — 86901 BLOOD TYPING SEROLOGIC RH(D): CPT | Mod: HCNC

## 2020-12-16 ENCOUNTER — PES CALL (OUTPATIENT)
Dept: ADMINISTRATIVE | Facility: CLINIC | Age: 70
End: 2020-12-16

## 2020-12-17 ENCOUNTER — LAB VISIT (OUTPATIENT)
Dept: LAB | Facility: HOSPITAL | Age: 70
End: 2020-12-17
Attending: INTERNAL MEDICINE
Payer: MEDICARE

## 2020-12-17 DIAGNOSIS — D46.C MDS (MYELODYSPLASTIC SYNDROME) WITH 5Q DELETION: ICD-10-CM

## 2020-12-17 DIAGNOSIS — D46.C MDS (MYELODYSPLASTIC SYNDROME) WITH 5Q DELETION: Primary | ICD-10-CM

## 2020-12-17 DIAGNOSIS — D46.9 MYELODYSPLASTIC SYNDROME: ICD-10-CM

## 2020-12-17 DIAGNOSIS — D63.0 ANEMIA IN NEOPLASTIC DISEASE: ICD-10-CM

## 2020-12-17 LAB
ABO + RH BLD: NORMAL
ANISOCYTOSIS BLD QL SMEAR: SLIGHT
BASOPHILS # BLD AUTO: 0.02 K/UL (ref 0–0.2)
BASOPHILS NFR BLD: 0.8 % (ref 0–1.9)
BLD GP AB SCN CELLS X3 SERPL QL: NORMAL
DACRYOCYTES BLD QL SMEAR: ABNORMAL
DIFFERENTIAL METHOD: ABNORMAL
EOSINOPHIL # BLD AUTO: 0.1 K/UL (ref 0–0.5)
EOSINOPHIL NFR BLD: 4 % (ref 0–8)
ERYTHROCYTE [DISTWIDTH] IN BLOOD BY AUTOMATED COUNT: 17.2 % (ref 11.5–14.5)
HCT VFR BLD AUTO: 19.8 % (ref 37–48.5)
HGB BLD-MCNC: 6.4 G/DL (ref 12–16)
IMM GRANULOCYTES # BLD AUTO: 0.03 K/UL (ref 0–0.04)
IMM GRANULOCYTES NFR BLD AUTO: 1.2 % (ref 0–0.5)
LYMPHOCYTES # BLD AUTO: 1 K/UL (ref 1–4.8)
LYMPHOCYTES NFR BLD: 38.7 % (ref 18–48)
MCH RBC QN AUTO: 31.7 PG (ref 27–31)
MCHC RBC AUTO-ENTMCNC: 32.3 G/DL (ref 32–36)
MCV RBC AUTO: 98 FL (ref 82–98)
MONOCYTES # BLD AUTO: 0.1 K/UL (ref 0.3–1)
MONOCYTES NFR BLD: 3.2 % (ref 4–15)
NEUTROPHILS # BLD AUTO: 1.3 K/UL (ref 1.8–7.7)
NEUTROPHILS NFR BLD: 52.1 % (ref 38–73)
NRBC BLD-RTO: 0 /100 WBC
OVALOCYTES BLD QL SMEAR: ABNORMAL
PLATELET # BLD AUTO: 123 K/UL (ref 150–350)
PLATELET BLD QL SMEAR: ABNORMAL
PMV BLD AUTO: 11.5 FL (ref 9.2–12.9)
POIKILOCYTOSIS BLD QL SMEAR: SLIGHT
RBC # BLD AUTO: 2.02 M/UL (ref 4–5.4)
WBC # BLD AUTO: 2.48 K/UL (ref 3.9–12.7)

## 2020-12-17 PROCEDURE — 86922 COMPATIBILITY TEST ANTIGLOB: CPT | Mod: HCNC

## 2020-12-17 PROCEDURE — 86902 BLOOD TYPE ANTIGEN DONOR EA: CPT | Mod: HCNC

## 2020-12-17 PROCEDURE — 85025 COMPLETE CBC W/AUTO DIFF WBC: CPT | Mod: HCNC

## 2020-12-17 PROCEDURE — 86901 BLOOD TYPING SEROLOGIC RH(D): CPT | Mod: HCNC

## 2020-12-17 PROCEDURE — 36415 COLL VENOUS BLD VENIPUNCTURE: CPT | Mod: HCNC

## 2020-12-17 RX ORDER — HYDROCODONE BITARTRATE AND ACETAMINOPHEN 500; 5 MG/1; MG/1
TABLET ORAL ONCE
Status: CANCELLED | OUTPATIENT
Start: 2020-12-17 | End: 2020-12-17

## 2020-12-17 RX ORDER — ACETAMINOPHEN 325 MG/1
650 TABLET ORAL
Status: CANCELLED | OUTPATIENT
Start: 2020-12-17

## 2020-12-17 RX ORDER — DIPHENHYDRAMINE HCL 25 MG
25 CAPSULE ORAL
Status: CANCELLED | OUTPATIENT
Start: 2020-12-17

## 2020-12-18 ENCOUNTER — INFUSION (OUTPATIENT)
Dept: INFUSION THERAPY | Facility: HOSPITAL | Age: 70
End: 2020-12-18
Payer: MEDICARE

## 2020-12-18 VITALS
HEART RATE: 69 BPM | RESPIRATION RATE: 18 BRPM | DIASTOLIC BLOOD PRESSURE: 81 MMHG | SYSTOLIC BLOOD PRESSURE: 165 MMHG | TEMPERATURE: 98 F

## 2020-12-18 DIAGNOSIS — D63.0 ANEMIA IN NEOPLASTIC DISEASE: ICD-10-CM

## 2020-12-18 DIAGNOSIS — D46.C MDS (MYELODYSPLASTIC SYNDROME) WITH 5Q DELETION: ICD-10-CM

## 2020-12-18 PROCEDURE — 63600175 PHARM REV CODE 636 W HCPCS: Mod: HCNC | Performed by: INTERNAL MEDICINE

## 2020-12-18 PROCEDURE — P9040 RBC LEUKOREDUCED IRRADIATED: HCPCS | Mod: HCNC

## 2020-12-18 PROCEDURE — 25000003 PHARM REV CODE 250: Mod: HCNC | Performed by: INTERNAL MEDICINE

## 2020-12-18 PROCEDURE — 36430 TRANSFUSION BLD/BLD COMPNT: CPT | Mod: HCNC

## 2020-12-18 RX ORDER — HEPARIN 100 UNIT/ML
500 SYRINGE INTRAVENOUS
Status: COMPLETED | OUTPATIENT
Start: 2020-12-18 | End: 2020-12-18

## 2020-12-18 RX ORDER — HYDROCODONE BITARTRATE AND ACETAMINOPHEN 500; 5 MG/1; MG/1
TABLET ORAL ONCE
Status: COMPLETED | OUTPATIENT
Start: 2020-12-18 | End: 2020-12-18

## 2020-12-18 RX ORDER — DIPHENHYDRAMINE HCL 25 MG
25 CAPSULE ORAL
Status: COMPLETED | OUTPATIENT
Start: 2020-12-18 | End: 2020-12-18

## 2020-12-18 RX ORDER — ACETAMINOPHEN 325 MG/1
650 TABLET ORAL
Status: COMPLETED | OUTPATIENT
Start: 2020-12-18 | End: 2020-12-18

## 2020-12-18 RX ADMIN — SODIUM CHLORIDE: 0.9 INJECTION, SOLUTION INTRAVENOUS at 10:12

## 2020-12-18 RX ADMIN — ACETAMINOPHEN 650 MG: 325 TABLET ORAL at 10:12

## 2020-12-18 RX ADMIN — HEPARIN 500 UNITS: 100 SYRINGE at 12:12

## 2020-12-18 RX ADMIN — DIPHENHYDRAMINE HYDROCHLORIDE 25 MG: 25 CAPSULE ORAL at 10:12

## 2020-12-18 NOTE — PLAN OF CARE
Patient tolerated PRBC with no complications. VSS. Pt instructed to call MD with any problems. NAD. Pt discharged home independently.

## 2020-12-21 ENCOUNTER — LAB VISIT (OUTPATIENT)
Dept: LAB | Facility: HOSPITAL | Age: 70
End: 2020-12-21
Attending: INTERNAL MEDICINE
Payer: MEDICARE

## 2020-12-21 DIAGNOSIS — D46.C MDS (MYELODYSPLASTIC SYNDROME) WITH 5Q DELETION: ICD-10-CM

## 2020-12-21 DIAGNOSIS — D46.9 MYELODYSPLASTIC SYNDROME: ICD-10-CM

## 2020-12-21 LAB
ABO + RH BLD: NORMAL
ANISOCYTOSIS BLD QL SMEAR: SLIGHT
BASOPHILS # BLD AUTO: 0.02 K/UL (ref 0–0.2)
BASOPHILS NFR BLD: 0.8 % (ref 0–1.9)
BLD GP AB SCN CELLS X3 SERPL QL: NORMAL
DIFFERENTIAL METHOD: ABNORMAL
EOSINOPHIL # BLD AUTO: 0.1 K/UL (ref 0–0.5)
EOSINOPHIL NFR BLD: 3.9 % (ref 0–8)
ERYTHROCYTE [DISTWIDTH] IN BLOOD BY AUTOMATED COUNT: 16.2 % (ref 11.5–14.5)
HCT VFR BLD AUTO: 22.3 % (ref 37–48.5)
HGB BLD-MCNC: 7.4 G/DL (ref 12–16)
IMM GRANULOCYTES # BLD AUTO: 0.02 K/UL (ref 0–0.04)
IMM GRANULOCYTES NFR BLD AUTO: 0.8 % (ref 0–0.5)
LYMPHOCYTES # BLD AUTO: 0.9 K/UL (ref 1–4.8)
LYMPHOCYTES NFR BLD: 34.4 % (ref 18–48)
MCH RBC QN AUTO: 31.8 PG (ref 27–31)
MCHC RBC AUTO-ENTMCNC: 33.2 G/DL (ref 32–36)
MCV RBC AUTO: 96 FL (ref 82–98)
MONOCYTES # BLD AUTO: 0.1 K/UL (ref 0.3–1)
MONOCYTES NFR BLD: 2.3 % (ref 4–15)
NEUTROPHILS # BLD AUTO: 1.5 K/UL (ref 1.8–7.7)
NEUTROPHILS NFR BLD: 57.8 % (ref 38–73)
NRBC BLD-RTO: 0 /100 WBC
OVALOCYTES BLD QL SMEAR: ABNORMAL
PLATELET # BLD AUTO: 99 K/UL (ref 150–350)
PLATELET BLD QL SMEAR: ABNORMAL
PMV BLD AUTO: 11.4 FL (ref 9.2–12.9)
POIKILOCYTOSIS BLD QL SMEAR: SLIGHT
RBC # BLD AUTO: 2.33 M/UL (ref 4–5.4)
WBC # BLD AUTO: 2.56 K/UL (ref 3.9–12.7)

## 2020-12-21 PROCEDURE — 86901 BLOOD TYPING SEROLOGIC RH(D): CPT | Mod: HCNC

## 2020-12-21 PROCEDURE — 85025 COMPLETE CBC W/AUTO DIFF WBC: CPT | Mod: HCNC

## 2020-12-21 PROCEDURE — 36415 COLL VENOUS BLD VENIPUNCTURE: CPT | Mod: HCNC

## 2020-12-22 ENCOUNTER — PATIENT MESSAGE (OUTPATIENT)
Dept: HEMATOLOGY/ONCOLOGY | Facility: CLINIC | Age: 70
End: 2020-12-22

## 2020-12-23 ENCOUNTER — PATIENT MESSAGE (OUTPATIENT)
Dept: HEMATOLOGY/ONCOLOGY | Facility: CLINIC | Age: 70
End: 2020-12-23

## 2020-12-23 ENCOUNTER — LAB VISIT (OUTPATIENT)
Dept: LAB | Facility: HOSPITAL | Age: 70
End: 2020-12-23
Attending: INTERNAL MEDICINE
Payer: MEDICARE

## 2020-12-23 ENCOUNTER — TELEPHONE (OUTPATIENT)
Dept: HEMATOLOGY/ONCOLOGY | Facility: CLINIC | Age: 70
End: 2020-12-23

## 2020-12-23 DIAGNOSIS — D63.0 ANEMIA IN NEOPLASTIC DISEASE: ICD-10-CM

## 2020-12-23 DIAGNOSIS — D46.9 MYELODYSPLASTIC SYNDROME: ICD-10-CM

## 2020-12-23 DIAGNOSIS — D46.C MDS (MYELODYSPLASTIC SYNDROME) WITH 5Q DELETION: ICD-10-CM

## 2020-12-23 DIAGNOSIS — D46.C MDS (MYELODYSPLASTIC SYNDROME) WITH 5Q DELETION: Primary | ICD-10-CM

## 2020-12-23 LAB
ABO + RH BLD: NORMAL
BASOPHILS # BLD AUTO: 0.02 K/UL (ref 0–0.2)
BASOPHILS NFR BLD: 0.9 % (ref 0–1.9)
BLD GP AB SCN CELLS X3 SERPL QL: NORMAL
DIFFERENTIAL METHOD: ABNORMAL
EOSINOPHIL # BLD AUTO: 0.1 K/UL (ref 0–0.5)
EOSINOPHIL NFR BLD: 3.6 % (ref 0–8)
ERYTHROCYTE [DISTWIDTH] IN BLOOD BY AUTOMATED COUNT: 16.6 % (ref 11.5–14.5)
HCT VFR BLD AUTO: 19.9 % (ref 37–48.5)
HGB BLD-MCNC: 6.4 G/DL (ref 12–16)
IMM GRANULOCYTES # BLD AUTO: 0.03 K/UL (ref 0–0.04)
IMM GRANULOCYTES NFR BLD AUTO: 1.4 % (ref 0–0.5)
LYMPHOCYTES # BLD AUTO: 0.7 K/UL (ref 1–4.8)
LYMPHOCYTES NFR BLD: 31.4 % (ref 18–48)
MCH RBC QN AUTO: 30.8 PG (ref 27–31)
MCHC RBC AUTO-ENTMCNC: 32.2 G/DL (ref 32–36)
MCV RBC AUTO: 96 FL (ref 82–98)
MONOCYTES # BLD AUTO: 0 K/UL (ref 0.3–1)
MONOCYTES NFR BLD: 1.8 % (ref 4–15)
NEUTROPHILS # BLD AUTO: 1.3 K/UL (ref 1.8–7.7)
NEUTROPHILS NFR BLD: 60.9 % (ref 38–73)
NRBC BLD-RTO: 0 /100 WBC
PLATELET # BLD AUTO: 86 K/UL (ref 150–350)
PMV BLD AUTO: 11.4 FL (ref 9.2–12.9)
RBC # BLD AUTO: 2.08 M/UL (ref 4–5.4)
WBC # BLD AUTO: 2.2 K/UL (ref 3.9–12.7)

## 2020-12-23 PROCEDURE — 86922 COMPATIBILITY TEST ANTIGLOB: CPT | Mod: HCNC

## 2020-12-23 PROCEDURE — 36415 COLL VENOUS BLD VENIPUNCTURE: CPT | Mod: HCNC

## 2020-12-23 PROCEDURE — 85025 COMPLETE CBC W/AUTO DIFF WBC: CPT | Mod: HCNC

## 2020-12-23 PROCEDURE — 86850 RBC ANTIBODY SCREEN: CPT | Mod: HCNC

## 2020-12-23 RX ORDER — ACETAMINOPHEN 325 MG/1
650 TABLET ORAL
Status: CANCELLED | OUTPATIENT
Start: 2020-12-23

## 2020-12-23 RX ORDER — HYDROCODONE BITARTRATE AND ACETAMINOPHEN 500; 5 MG/1; MG/1
TABLET ORAL ONCE
Status: CANCELLED | OUTPATIENT
Start: 2020-12-23 | End: 2020-12-23

## 2020-12-23 RX ORDER — DIPHENHYDRAMINE HCL 25 MG
25 CAPSULE ORAL
Status: CANCELLED | OUTPATIENT
Start: 2020-12-23

## 2020-12-23 NOTE — TELEPHONE ENCOUNTER
Spoke to patient to discuss lab results, informed patient that blood can be scheduled for saturday

## 2020-12-23 NOTE — TELEPHONE ENCOUNTER
----- Message from Alia Hendricks sent at 12/23/2020 11:58 AM CST -----  Contact: Pt  Returning a call     Caller name:: Pt    Who Left Message for Patient:: Jagruti    Communication preference:: [phone] (M)    Additional info::

## 2020-12-24 PROCEDURE — 86902 BLOOD TYPE ANTIGEN DONOR EA: CPT | Mod: HCNC

## 2020-12-26 ENCOUNTER — INFUSION (OUTPATIENT)
Dept: INFUSION THERAPY | Facility: HOSPITAL | Age: 70
End: 2020-12-26
Attending: INTERNAL MEDICINE
Payer: MEDICARE

## 2020-12-26 VITALS
TEMPERATURE: 98 F | RESPIRATION RATE: 17 BRPM | OXYGEN SATURATION: 98 % | HEART RATE: 66 BPM | SYSTOLIC BLOOD PRESSURE: 147 MMHG | DIASTOLIC BLOOD PRESSURE: 67 MMHG

## 2020-12-26 DIAGNOSIS — D46.C MDS (MYELODYSPLASTIC SYNDROME) WITH 5Q DELETION: Primary | ICD-10-CM

## 2020-12-26 DIAGNOSIS — D63.0 ANEMIA IN NEOPLASTIC DISEASE: ICD-10-CM

## 2020-12-26 DIAGNOSIS — D46.9 MDS (MYELODYSPLASTIC SYNDROME): ICD-10-CM

## 2020-12-26 PROCEDURE — A4216 STERILE WATER/SALINE, 10 ML: HCPCS | Mod: HCNC | Performed by: INTERNAL MEDICINE

## 2020-12-26 PROCEDURE — 25000003 PHARM REV CODE 250: Mod: HCNC | Performed by: INTERNAL MEDICINE

## 2020-12-26 PROCEDURE — P9040 RBC LEUKOREDUCED IRRADIATED: HCPCS | Mod: HCNC

## 2020-12-26 PROCEDURE — 63600175 PHARM REV CODE 636 W HCPCS: Mod: HCNC | Performed by: INTERNAL MEDICINE

## 2020-12-26 PROCEDURE — 36430 TRANSFUSION BLD/BLD COMPNT: CPT | Mod: HCNC

## 2020-12-26 RX ORDER — DIPHENHYDRAMINE HCL 25 MG
25 CAPSULE ORAL
Status: COMPLETED | OUTPATIENT
Start: 2020-12-26 | End: 2020-12-26

## 2020-12-26 RX ORDER — ACETAMINOPHEN 325 MG/1
650 TABLET ORAL
Status: COMPLETED | OUTPATIENT
Start: 2020-12-26 | End: 2020-12-26

## 2020-12-26 RX ORDER — HEPARIN 100 UNIT/ML
500 SYRINGE INTRAVENOUS
Status: CANCELLED | OUTPATIENT
Start: 2020-12-26

## 2020-12-26 RX ORDER — HYDROCODONE BITARTRATE AND ACETAMINOPHEN 500; 5 MG/1; MG/1
TABLET ORAL ONCE
Status: COMPLETED | OUTPATIENT
Start: 2020-12-26 | End: 2020-12-26

## 2020-12-26 RX ORDER — HEPARIN 100 UNIT/ML
500 SYRINGE INTRAVENOUS
Status: COMPLETED | OUTPATIENT
Start: 2020-12-26 | End: 2020-12-26

## 2020-12-26 RX ORDER — SODIUM CHLORIDE 0.9 % (FLUSH) 0.9 %
10 SYRINGE (ML) INJECTION
Status: COMPLETED | OUTPATIENT
Start: 2020-12-26 | End: 2020-12-26

## 2020-12-26 RX ORDER — SODIUM CHLORIDE 0.9 % (FLUSH) 0.9 %
10 SYRINGE (ML) INJECTION
Status: CANCELLED | OUTPATIENT
Start: 2020-12-26

## 2020-12-26 RX ADMIN — Medication 10 ML: at 10:12

## 2020-12-26 RX ADMIN — SODIUM CHLORIDE: 0.9 INJECTION, SOLUTION INTRAVENOUS at 08:12

## 2020-12-26 RX ADMIN — ACETAMINOPHEN 650 MG: 325 TABLET ORAL at 08:12

## 2020-12-26 RX ADMIN — DIPHENHYDRAMINE HYDROCHLORIDE 25 MG: 25 CAPSULE ORAL at 08:12

## 2020-12-26 RX ADMIN — HEPARIN 500 UNITS: 100 SYRINGE at 10:12

## 2020-12-26 NOTE — PLAN OF CARE
Pt got 1 unit blood today and tolerated well, with no complications or s/s adverse reaction. Right chest PAC + blood return, flushed with NS and heparin and de-accessed prior to discharge. Band-aid applied to site. RTC 12/28/2020 for labs, pt verbalized understanding. Pt instructed to notify Dr. Valdes's office if concerns arise. Pt discharged with NAD, ambulating independently using rollator.

## 2020-12-26 NOTE — PLAN OF CARE
0845 pt arrived for 1 unit pRBCs. Pt ambulatory to chair w/ walker. Pt doing well. VSS on arrival. Does report fatigue, SOB on exertion w/ anemia. Reviewed meds, hx, axs, labs, tx plan in detail. New consent obtained. Port accessed maintaining sterile technique: flushing easily, +blood return. Blankets/snacks offered. Reclined in chair. WC pt closely.

## 2020-12-28 ENCOUNTER — LAB VISIT (OUTPATIENT)
Dept: LAB | Facility: HOSPITAL | Age: 70
End: 2020-12-28
Attending: INTERNAL MEDICINE
Payer: MEDICARE

## 2020-12-28 ENCOUNTER — PATIENT MESSAGE (OUTPATIENT)
Dept: HEMATOLOGY/ONCOLOGY | Facility: CLINIC | Age: 70
End: 2020-12-28

## 2020-12-28 DIAGNOSIS — D46.C MDS (MYELODYSPLASTIC SYNDROME) WITH 5Q DELETION: ICD-10-CM

## 2020-12-28 DIAGNOSIS — D63.0 ANEMIA IN NEOPLASTIC DISEASE: ICD-10-CM

## 2020-12-28 DIAGNOSIS — D46.C MDS (MYELODYSPLASTIC SYNDROME) WITH 5Q DELETION: Primary | ICD-10-CM

## 2020-12-28 DIAGNOSIS — D46.9 MYELODYSPLASTIC SYNDROME: ICD-10-CM

## 2020-12-28 LAB
ABO + RH BLD: NORMAL
ANISOCYTOSIS BLD QL SMEAR: SLIGHT
BASOPHILS # BLD AUTO: ABNORMAL K/UL (ref 0–0.2)
BASOPHILS NFR BLD: 0 % (ref 0–1.9)
BLD GP AB SCN CELLS X3 SERPL QL: NORMAL
DACRYOCYTES BLD QL SMEAR: ABNORMAL
DIFFERENTIAL METHOD: ABNORMAL
EOSINOPHIL # BLD AUTO: ABNORMAL K/UL (ref 0–0.5)
EOSINOPHIL NFR BLD: 7 % (ref 0–8)
ERYTHROCYTE [DISTWIDTH] IN BLOOD BY AUTOMATED COUNT: 17.1 % (ref 11.5–14.5)
HCT VFR BLD AUTO: 24.6 % (ref 37–48.5)
HGB BLD-MCNC: 7.9 G/DL (ref 12–16)
IMM GRANULOCYTES # BLD AUTO: ABNORMAL K/UL (ref 0–0.04)
IMM GRANULOCYTES NFR BLD AUTO: ABNORMAL % (ref 0–0.5)
LYMPHOCYTES # BLD AUTO: ABNORMAL K/UL (ref 1–4.8)
LYMPHOCYTES NFR BLD: 44 % (ref 18–48)
MCH RBC QN AUTO: 30.2 PG (ref 27–31)
MCHC RBC AUTO-ENTMCNC: 32.1 G/DL (ref 32–36)
MCV RBC AUTO: 94 FL (ref 82–98)
MONOCYTES # BLD AUTO: ABNORMAL K/UL (ref 0.3–1)
MONOCYTES NFR BLD: 1 % (ref 4–15)
NEUTROPHILS # BLD AUTO: ABNORMAL K/UL (ref 1.8–7.7)
NEUTROPHILS NFR BLD: 47 % (ref 38–73)
NEUTS BAND NFR BLD MANUAL: 1 %
NRBC BLD-RTO: 0 /100 WBC
OVALOCYTES BLD QL SMEAR: ABNORMAL
PLATELET # BLD AUTO: 155 K/UL (ref 150–350)
PLATELET BLD QL SMEAR: ABNORMAL
PMV BLD AUTO: 11.5 FL (ref 9.2–12.9)
POIKILOCYTOSIS BLD QL SMEAR: SLIGHT
RBC # BLD AUTO: 2.62 M/UL (ref 4–5.4)
WBC # BLD AUTO: 1.75 K/UL (ref 3.9–12.7)

## 2020-12-28 PROCEDURE — 85007 BL SMEAR W/DIFF WBC COUNT: CPT | Mod: HCNC

## 2020-12-28 PROCEDURE — 86922 COMPATIBILITY TEST ANTIGLOB: CPT | Mod: HCNC

## 2020-12-28 PROCEDURE — 36415 COLL VENOUS BLD VENIPUNCTURE: CPT | Mod: HCNC

## 2020-12-28 PROCEDURE — 85027 COMPLETE CBC AUTOMATED: CPT | Mod: HCNC

## 2020-12-28 PROCEDURE — 86900 BLOOD TYPING SEROLOGIC ABO: CPT | Mod: HCNC

## 2020-12-28 RX ORDER — DIPHENHYDRAMINE HCL 25 MG
25 CAPSULE ORAL
Status: CANCELLED | OUTPATIENT
Start: 2020-12-28

## 2020-12-28 RX ORDER — ACETAMINOPHEN 325 MG/1
650 TABLET ORAL
Status: CANCELLED | OUTPATIENT
Start: 2020-12-28

## 2020-12-28 RX ORDER — HYDROCODONE BITARTRATE AND ACETAMINOPHEN 500; 5 MG/1; MG/1
TABLET ORAL ONCE
Status: CANCELLED | OUTPATIENT
Start: 2020-12-28 | End: 2020-12-28

## 2020-12-29 ENCOUNTER — TELEPHONE (OUTPATIENT)
Dept: INFUSION THERAPY | Facility: HOSPITAL | Age: 70
End: 2020-12-29

## 2020-12-29 ENCOUNTER — INFUSION (OUTPATIENT)
Dept: INFUSION THERAPY | Facility: HOSPITAL | Age: 70
End: 2020-12-29
Payer: MEDICARE

## 2020-12-29 VITALS
OXYGEN SATURATION: 99 % | HEART RATE: 68 BPM | RESPIRATION RATE: 16 BRPM | DIASTOLIC BLOOD PRESSURE: 74 MMHG | SYSTOLIC BLOOD PRESSURE: 164 MMHG | TEMPERATURE: 98 F

## 2020-12-29 DIAGNOSIS — D46.9 MDS (MYELODYSPLASTIC SYNDROME): ICD-10-CM

## 2020-12-29 DIAGNOSIS — D63.0 ANEMIA IN NEOPLASTIC DISEASE: ICD-10-CM

## 2020-12-29 DIAGNOSIS — D46.C MDS (MYELODYSPLASTIC SYNDROME) WITH 5Q DELETION: Primary | ICD-10-CM

## 2020-12-29 PROCEDURE — A4216 STERILE WATER/SALINE, 10 ML: HCPCS | Mod: HCNC | Performed by: INTERNAL MEDICINE

## 2020-12-29 PROCEDURE — 63600175 PHARM REV CODE 636 W HCPCS: Mod: HCNC | Performed by: INTERNAL MEDICINE

## 2020-12-29 PROCEDURE — P9040 RBC LEUKOREDUCED IRRADIATED: HCPCS | Mod: HCNC

## 2020-12-29 PROCEDURE — 25000003 PHARM REV CODE 250: Mod: HCNC | Performed by: INTERNAL MEDICINE

## 2020-12-29 PROCEDURE — 36430 TRANSFUSION BLD/BLD COMPNT: CPT | Mod: HCNC

## 2020-12-29 RX ORDER — HYDROCODONE BITARTRATE AND ACETAMINOPHEN 500; 5 MG/1; MG/1
TABLET ORAL ONCE
Status: COMPLETED | OUTPATIENT
Start: 2020-12-29 | End: 2020-12-29

## 2020-12-29 RX ORDER — DIPHENHYDRAMINE HCL 25 MG
25 CAPSULE ORAL
Status: COMPLETED | OUTPATIENT
Start: 2020-12-29 | End: 2020-12-29

## 2020-12-29 RX ORDER — HEPARIN 100 UNIT/ML
500 SYRINGE INTRAVENOUS
Status: COMPLETED | OUTPATIENT
Start: 2020-12-29 | End: 2020-12-29

## 2020-12-29 RX ORDER — ACETAMINOPHEN 325 MG/1
650 TABLET ORAL
Status: COMPLETED | OUTPATIENT
Start: 2020-12-29 | End: 2020-12-29

## 2020-12-29 RX ORDER — SODIUM CHLORIDE 0.9 % (FLUSH) 0.9 %
10 SYRINGE (ML) INJECTION
Status: COMPLETED | OUTPATIENT
Start: 2020-12-29 | End: 2020-12-29

## 2020-12-29 RX ORDER — HEPARIN 100 UNIT/ML
500 SYRINGE INTRAVENOUS
Status: CANCELLED | OUTPATIENT
Start: 2020-12-29

## 2020-12-29 RX ORDER — SODIUM CHLORIDE 0.9 % (FLUSH) 0.9 %
10 SYRINGE (ML) INJECTION
Status: CANCELLED | OUTPATIENT
Start: 2020-12-29

## 2020-12-29 RX ADMIN — SODIUM CHLORIDE: 0.9 INJECTION, SOLUTION INTRAVENOUS at 12:12

## 2020-12-29 RX ADMIN — ACETAMINOPHEN 650 MG: 325 TABLET ORAL at 12:12

## 2020-12-29 RX ADMIN — Medication 10 ML: at 02:12

## 2020-12-29 RX ADMIN — DIPHENHYDRAMINE HYDROCHLORIDE 25 MG: 25 CAPSULE ORAL at 12:12

## 2020-12-29 RX ADMIN — HEPARIN 500 UNITS: 100 SYRINGE at 02:12

## 2020-12-29 NOTE — PLAN OF CARE
1500 pt completed and tolerated 1 unit pRBCs w/o issues. Understands potential of delayed reaction and knows when to see emergent care. VSS s/p tx. Port de-accessed: flushed, +blood return, hep locked prior to removal. AVS provided. Pt ambulatory out of infusion suite independently. To return on the 31st for lab re-assessment.

## 2020-12-29 NOTE — PLAN OF CARE
1215 PT arrived for 1 unit pRBCs. Ambulatory to chair w/ walker. Feeling fatigued, weak and has exertional SOB. Little energy to perform ADLs. Pt last transfused Saturday. Consent up-to-date. Reviewed meds, hx, axs, labs, tx plan in detail. Port accessed maintaining sterile technique: flushing easily, +blood return. Blankets/snacks offered. Reclined in chair. WC pt closely

## 2020-12-29 NOTE — TELEPHONE ENCOUNTER
Contacted patient at 1015 morning to see if planning to attend and to inform her that her blood was prepared and ready to go per blood bank. Also sent message via portal on recent thread between her and clinic. Currently message not ready. No answer on phone, voicemail left requesting return call. Will continue to follow up.

## 2020-12-30 ENCOUNTER — PATIENT MESSAGE (OUTPATIENT)
Dept: HEMATOLOGY/ONCOLOGY | Facility: CLINIC | Age: 70
End: 2020-12-30

## 2020-12-30 DIAGNOSIS — K21.9 GASTROESOPHAGEAL REFLUX DISEASE WITHOUT ESOPHAGITIS: ICD-10-CM

## 2020-12-30 RX ORDER — PANTOPRAZOLE SODIUM 40 MG/1
40 TABLET, DELAYED RELEASE ORAL DAILY
Qty: 30 TABLET | Refills: 3 | Status: SHIPPED | OUTPATIENT
Start: 2020-12-30 | End: 2021-01-01 | Stop reason: SDUPTHER

## 2021-01-01 ENCOUNTER — SPECIALTY PHARMACY (OUTPATIENT)
Dept: PHARMACY | Facility: CLINIC | Age: 71
End: 2021-01-01

## 2021-01-01 ENCOUNTER — LAB VISIT (OUTPATIENT)
Dept: LAB | Facility: HOSPITAL | Age: 71
End: 2021-01-01
Attending: INTERNAL MEDICINE
Payer: MEDICARE

## 2021-01-01 ENCOUNTER — INFUSION (OUTPATIENT)
Dept: INFUSION THERAPY | Facility: HOSPITAL | Age: 71
End: 2021-01-01
Payer: MEDICARE

## 2021-01-01 ENCOUNTER — PATIENT OUTREACH (OUTPATIENT)
Dept: ADMINISTRATIVE | Facility: HOSPITAL | Age: 71
End: 2021-01-01

## 2021-01-01 ENCOUNTER — LAB VISIT (OUTPATIENT)
Dept: LAB | Facility: HOSPITAL | Age: 71
End: 2021-01-01
Payer: MEDICARE

## 2021-01-01 ENCOUNTER — DOCUMENTATION ONLY (OUTPATIENT)
Dept: HEMATOLOGY/ONCOLOGY | Facility: CLINIC | Age: 71
End: 2021-01-01

## 2021-01-01 ENCOUNTER — TELEPHONE (OUTPATIENT)
Dept: HEMATOLOGY/ONCOLOGY | Facility: CLINIC | Age: 71
End: 2021-01-01

## 2021-01-01 ENCOUNTER — TELEPHONE (OUTPATIENT)
Dept: HEMATOLOGY/ONCOLOGY | Facility: CLINIC | Age: 71
End: 2021-01-01
Payer: MEDICARE

## 2021-01-01 ENCOUNTER — INFUSION (OUTPATIENT)
Dept: INFUSION THERAPY | Facility: HOSPITAL | Age: 71
End: 2021-01-01
Attending: INTERNAL MEDICINE
Payer: MEDICARE

## 2021-01-01 ENCOUNTER — PATIENT OUTREACH (OUTPATIENT)
Dept: ADMINISTRATIVE | Facility: HOSPITAL | Age: 71
End: 2021-01-01
Payer: MEDICARE

## 2021-01-01 ENCOUNTER — TELEPHONE (OUTPATIENT)
Dept: INTERNAL MEDICINE | Facility: CLINIC | Age: 71
End: 2021-01-01

## 2021-01-01 ENCOUNTER — OFFICE VISIT (OUTPATIENT)
Dept: HEMATOLOGY/ONCOLOGY | Facility: CLINIC | Age: 71
End: 2021-01-01
Payer: MEDICARE

## 2021-01-01 ENCOUNTER — TELEPHONE (OUTPATIENT)
Dept: INFUSION THERAPY | Facility: HOSPITAL | Age: 71
End: 2021-01-01

## 2021-01-01 ENCOUNTER — PATIENT MESSAGE (OUTPATIENT)
Dept: HEMATOLOGY/ONCOLOGY | Facility: CLINIC | Age: 71
End: 2021-01-01

## 2021-01-01 ENCOUNTER — HOSPITAL ENCOUNTER (OUTPATIENT)
Dept: RADIOLOGY | Facility: HOSPITAL | Age: 71
Discharge: HOME | End: 2021-10-07
Attending: INTERNAL MEDICINE
Payer: MEDICARE

## 2021-01-01 ENCOUNTER — SPECIALTY PHARMACY (OUTPATIENT)
Dept: PHARMACY | Facility: CLINIC | Age: 71
End: 2021-01-01
Payer: MEDICARE

## 2021-01-01 ENCOUNTER — HOSPITAL ENCOUNTER (OUTPATIENT)
Dept: INTERVENTIONAL RADIOLOGY/VASCULAR | Facility: HOSPITAL | Age: 71
Discharge: HOME | End: 2021-11-10
Attending: INTERNAL MEDICINE
Payer: MEDICARE

## 2021-01-01 ENCOUNTER — PATIENT MESSAGE (OUTPATIENT)
Dept: HEMATOLOGY/ONCOLOGY | Facility: CLINIC | Age: 71
End: 2021-01-01
Payer: MEDICARE

## 2021-01-01 ENCOUNTER — PATIENT MESSAGE (OUTPATIENT)
Dept: ADMINISTRATIVE | Facility: HOSPITAL | Age: 71
End: 2021-01-01

## 2021-01-01 ENCOUNTER — OFFICE VISIT (OUTPATIENT)
Dept: INTERVENTIONAL RADIOLOGY/VASCULAR | Facility: CLINIC | Age: 71
End: 2021-01-01
Payer: MEDICARE

## 2021-01-01 ENCOUNTER — INFUSION (OUTPATIENT)
Dept: INFUSION THERAPY | Facility: HOSPITAL | Age: 71
End: 2021-01-01
Attending: NURSE PRACTITIONER
Payer: MEDICARE

## 2021-01-01 ENCOUNTER — LAB VISIT (OUTPATIENT)
Dept: SPORTS MEDICINE | Facility: CLINIC | Age: 71
End: 2021-01-01
Payer: MEDICARE

## 2021-01-01 ENCOUNTER — PATIENT MESSAGE (OUTPATIENT)
Dept: PHARMACY | Facility: CLINIC | Age: 71
End: 2021-01-01

## 2021-01-01 ENCOUNTER — CLINICAL SUPPORT (OUTPATIENT)
Dept: HEMATOLOGY/ONCOLOGY | Facility: CLINIC | Age: 71
End: 2021-01-01
Payer: MEDICARE

## 2021-01-01 ENCOUNTER — TELEPHONE (OUTPATIENT)
Dept: INTERVENTIONAL RADIOLOGY/VASCULAR | Facility: CLINIC | Age: 71
End: 2021-01-01

## 2021-01-01 ENCOUNTER — TELEPHONE (OUTPATIENT)
Dept: INTERVENTIONAL RADIOLOGY/VASCULAR | Facility: HOSPITAL | Age: 71
End: 2021-01-01
Payer: MEDICARE

## 2021-01-01 ENCOUNTER — OFFICE VISIT (OUTPATIENT)
Dept: HOME HEALTH SERVICES | Facility: CLINIC | Age: 71
End: 2021-01-01
Payer: MEDICARE

## 2021-01-01 ENCOUNTER — PATIENT MESSAGE (OUTPATIENT)
Dept: RESEARCH | Facility: HOSPITAL | Age: 71
End: 2021-01-01

## 2021-01-01 VITALS
DIASTOLIC BLOOD PRESSURE: 72 MMHG | SYSTOLIC BLOOD PRESSURE: 128 MMHG | HEART RATE: 72 BPM | HEART RATE: 85 BPM | RESPIRATION RATE: 18 BRPM | SYSTOLIC BLOOD PRESSURE: 163 MMHG | SYSTOLIC BLOOD PRESSURE: 124 MMHG | HEART RATE: 68 BPM | RESPIRATION RATE: 18 BRPM | TEMPERATURE: 98 F | TEMPERATURE: 98 F | DIASTOLIC BLOOD PRESSURE: 60 MMHG | WEIGHT: 161.63 LBS | DIASTOLIC BLOOD PRESSURE: 51 MMHG | HEIGHT: 65 IN | OXYGEN SATURATION: 100 % | RESPIRATION RATE: 18 BRPM | TEMPERATURE: 99 F | BODY MASS INDEX: 26.93 KG/M2

## 2021-01-01 VITALS
TEMPERATURE: 98 F | HEART RATE: 72 BPM | WEIGHT: 165 LBS | SYSTOLIC BLOOD PRESSURE: 145 MMHG | HEIGHT: 65 IN | TEMPERATURE: 97 F | RESPIRATION RATE: 18 BRPM | SYSTOLIC BLOOD PRESSURE: 127 MMHG | HEART RATE: 78 BPM | DIASTOLIC BLOOD PRESSURE: 77 MMHG | BODY MASS INDEX: 27.49 KG/M2 | DIASTOLIC BLOOD PRESSURE: 58 MMHG

## 2021-01-01 VITALS — DIASTOLIC BLOOD PRESSURE: 53 MMHG | SYSTOLIC BLOOD PRESSURE: 96 MMHG | RESPIRATION RATE: 18 BRPM | HEART RATE: 75 BPM

## 2021-01-01 VITALS
DIASTOLIC BLOOD PRESSURE: 60 MMHG | HEIGHT: 65 IN | OXYGEN SATURATION: 98 % | WEIGHT: 158.31 LBS | BODY MASS INDEX: 26.38 KG/M2 | SYSTOLIC BLOOD PRESSURE: 142 MMHG | HEART RATE: 72 BPM | RESPIRATION RATE: 16 BRPM

## 2021-01-01 VITALS
OXYGEN SATURATION: 96 % | SYSTOLIC BLOOD PRESSURE: 125 MMHG | HEART RATE: 71 BPM | SYSTOLIC BLOOD PRESSURE: 151 MMHG | DIASTOLIC BLOOD PRESSURE: 60 MMHG | TEMPERATURE: 98 F | HEART RATE: 75 BPM | DIASTOLIC BLOOD PRESSURE: 73 MMHG | OXYGEN SATURATION: 99 % | TEMPERATURE: 98 F | RESPIRATION RATE: 18 BRPM | RESPIRATION RATE: 18 BRPM

## 2021-01-01 VITALS
DIASTOLIC BLOOD PRESSURE: 72 MMHG | RESPIRATION RATE: 18 BRPM | TEMPERATURE: 98 F | OXYGEN SATURATION: 98 % | HEART RATE: 72 BPM | SYSTOLIC BLOOD PRESSURE: 123 MMHG

## 2021-01-01 VITALS
DIASTOLIC BLOOD PRESSURE: 63 MMHG | HEIGHT: 65 IN | SYSTOLIC BLOOD PRESSURE: 130 MMHG | RESPIRATION RATE: 18 BRPM | RESPIRATION RATE: 18 BRPM | BODY MASS INDEX: 27.18 KG/M2 | TEMPERATURE: 98 F | HEART RATE: 78 BPM | WEIGHT: 163.13 LBS | SYSTOLIC BLOOD PRESSURE: 137 MMHG | HEART RATE: 80 BPM | DIASTOLIC BLOOD PRESSURE: 62 MMHG

## 2021-01-01 VITALS
TEMPERATURE: 98 F | SYSTOLIC BLOOD PRESSURE: 144 MMHG | OXYGEN SATURATION: 98 % | HEART RATE: 61 BPM | DIASTOLIC BLOOD PRESSURE: 51 MMHG | RESPIRATION RATE: 18 BRPM

## 2021-01-01 VITALS
RESPIRATION RATE: 16 BRPM | RESPIRATION RATE: 16 BRPM | RESPIRATION RATE: 16 BRPM | OXYGEN SATURATION: 95 % | HEART RATE: 77 BPM | TEMPERATURE: 98 F | DIASTOLIC BLOOD PRESSURE: 46 MMHG | DIASTOLIC BLOOD PRESSURE: 61 MMHG | DIASTOLIC BLOOD PRESSURE: 64 MMHG | SYSTOLIC BLOOD PRESSURE: 139 MMHG | HEART RATE: 68 BPM | WEIGHT: 157.5 LBS | TEMPERATURE: 98 F | HEART RATE: 75 BPM | BODY MASS INDEX: 26.24 KG/M2 | SYSTOLIC BLOOD PRESSURE: 126 MMHG | SYSTOLIC BLOOD PRESSURE: 102 MMHG | HEIGHT: 65 IN

## 2021-01-01 VITALS
OXYGEN SATURATION: 98 % | RESPIRATION RATE: 18 BRPM | BODY MASS INDEX: 27.92 KG/M2 | WEIGHT: 163.56 LBS | SYSTOLIC BLOOD PRESSURE: 118 MMHG | TEMPERATURE: 99 F | HEART RATE: 72 BPM | DIASTOLIC BLOOD PRESSURE: 69 MMHG | TEMPERATURE: 98 F | DIASTOLIC BLOOD PRESSURE: 59 MMHG | RESPIRATION RATE: 18 BRPM | HEIGHT: 64 IN | HEART RATE: 86 BPM | SYSTOLIC BLOOD PRESSURE: 168 MMHG

## 2021-01-01 VITALS
BODY MASS INDEX: 27.73 KG/M2 | OXYGEN SATURATION: 96 % | TEMPERATURE: 98 F | HEIGHT: 65 IN | SYSTOLIC BLOOD PRESSURE: 106 MMHG | RESPIRATION RATE: 18 BRPM | DIASTOLIC BLOOD PRESSURE: 54 MMHG | WEIGHT: 166.44 LBS | HEART RATE: 70 BPM

## 2021-01-01 VITALS
HEART RATE: 69 BPM | RESPIRATION RATE: 18 BRPM | DIASTOLIC BLOOD PRESSURE: 75 MMHG | HEIGHT: 65 IN | BODY MASS INDEX: 26.26 KG/M2 | SYSTOLIC BLOOD PRESSURE: 186 MMHG | WEIGHT: 157.63 LBS | TEMPERATURE: 98 F

## 2021-01-01 VITALS
TEMPERATURE: 99 F | WEIGHT: 164 LBS | DIASTOLIC BLOOD PRESSURE: 60 MMHG | HEART RATE: 68 BPM | OXYGEN SATURATION: 95 % | SYSTOLIC BLOOD PRESSURE: 137 MMHG | RESPIRATION RATE: 16 BRPM | HEIGHT: 65 IN | BODY MASS INDEX: 27.32 KG/M2

## 2021-01-01 VITALS
SYSTOLIC BLOOD PRESSURE: 183 MMHG | BODY MASS INDEX: 25.88 KG/M2 | TEMPERATURE: 98 F | DIASTOLIC BLOOD PRESSURE: 83 MMHG | RESPIRATION RATE: 16 BRPM | OXYGEN SATURATION: 97 % | HEIGHT: 66 IN | HEART RATE: 74 BPM | WEIGHT: 161 LBS

## 2021-01-01 VITALS
SYSTOLIC BLOOD PRESSURE: 173 MMHG | DIASTOLIC BLOOD PRESSURE: 72 MMHG | TEMPERATURE: 98 F | WEIGHT: 161.5 LBS | BODY MASS INDEX: 26.91 KG/M2 | HEIGHT: 65 IN | OXYGEN SATURATION: 95 % | HEART RATE: 69 BPM | RESPIRATION RATE: 16 BRPM

## 2021-01-01 VITALS
DIASTOLIC BLOOD PRESSURE: 57 MMHG | SYSTOLIC BLOOD PRESSURE: 126 MMHG | TEMPERATURE: 98 F | DIASTOLIC BLOOD PRESSURE: 51 MMHG | HEART RATE: 73 BPM | HEART RATE: 76 BPM | TEMPERATURE: 98 F | HEIGHT: 64 IN | RESPIRATION RATE: 18 BRPM | RESPIRATION RATE: 18 BRPM | BODY MASS INDEX: 27.92 KG/M2 | SYSTOLIC BLOOD PRESSURE: 107 MMHG | WEIGHT: 163.56 LBS

## 2021-01-01 VITALS
SYSTOLIC BLOOD PRESSURE: 141 MMHG | DIASTOLIC BLOOD PRESSURE: 63 MMHG | OXYGEN SATURATION: 96 % | TEMPERATURE: 98 F | RESPIRATION RATE: 18 BRPM | HEART RATE: 72 BPM

## 2021-01-01 VITALS
SYSTOLIC BLOOD PRESSURE: 173 MMHG | TEMPERATURE: 97 F | DIASTOLIC BLOOD PRESSURE: 72 MMHG | HEART RATE: 67 BPM | RESPIRATION RATE: 18 BRPM | WEIGHT: 161.5 LBS | SYSTOLIC BLOOD PRESSURE: 154 MMHG | RESPIRATION RATE: 18 BRPM | DIASTOLIC BLOOD PRESSURE: 67 MMHG | TEMPERATURE: 98 F | TEMPERATURE: 98 F | HEIGHT: 65 IN | RESPIRATION RATE: 16 BRPM | HEART RATE: 69 BPM | BODY MASS INDEX: 26.91 KG/M2 | DIASTOLIC BLOOD PRESSURE: 67 MMHG | HEART RATE: 72 BPM | SYSTOLIC BLOOD PRESSURE: 150 MMHG

## 2021-01-01 VITALS
SYSTOLIC BLOOD PRESSURE: 158 MMHG | RESPIRATION RATE: 18 BRPM | TEMPERATURE: 98 F | DIASTOLIC BLOOD PRESSURE: 68 MMHG | RESPIRATION RATE: 18 BRPM | OXYGEN SATURATION: 96 % | TEMPERATURE: 98 F | HEART RATE: 71 BPM | SYSTOLIC BLOOD PRESSURE: 168 MMHG | DIASTOLIC BLOOD PRESSURE: 72 MMHG | HEART RATE: 75 BPM

## 2021-01-01 VITALS — RESPIRATION RATE: 18 BRPM | HEART RATE: 62 BPM | SYSTOLIC BLOOD PRESSURE: 150 MMHG | DIASTOLIC BLOOD PRESSURE: 60 MMHG

## 2021-01-01 VITALS
RESPIRATION RATE: 18 BRPM | DIASTOLIC BLOOD PRESSURE: 73 MMHG | HEART RATE: 77 BPM | TEMPERATURE: 98 F | RESPIRATION RATE: 18 BRPM | SYSTOLIC BLOOD PRESSURE: 142 MMHG | DIASTOLIC BLOOD PRESSURE: 72 MMHG | TEMPERATURE: 98 F | DIASTOLIC BLOOD PRESSURE: 65 MMHG | HEART RATE: 73 BPM | OXYGEN SATURATION: 95 % | OXYGEN SATURATION: 95 % | RESPIRATION RATE: 17 BRPM | TEMPERATURE: 98 F | SYSTOLIC BLOOD PRESSURE: 167 MMHG | HEART RATE: 76 BPM | SYSTOLIC BLOOD PRESSURE: 167 MMHG

## 2021-01-01 VITALS
DIASTOLIC BLOOD PRESSURE: 64 MMHG | TEMPERATURE: 99 F | HEART RATE: 83 BPM | SYSTOLIC BLOOD PRESSURE: 141 MMHG | RESPIRATION RATE: 18 BRPM

## 2021-01-01 VITALS
TEMPERATURE: 98 F | HEART RATE: 75 BPM | RESPIRATION RATE: 18 BRPM | DIASTOLIC BLOOD PRESSURE: 81 MMHG | SYSTOLIC BLOOD PRESSURE: 141 MMHG

## 2021-01-01 VITALS
SYSTOLIC BLOOD PRESSURE: 132 MMHG | WEIGHT: 164 LBS | BODY MASS INDEX: 28 KG/M2 | HEART RATE: 74 BPM | HEIGHT: 64 IN | OXYGEN SATURATION: 97 % | RESPIRATION RATE: 17 BRPM | DIASTOLIC BLOOD PRESSURE: 82 MMHG | TEMPERATURE: 98 F

## 2021-01-01 VITALS
TEMPERATURE: 98 F | DIASTOLIC BLOOD PRESSURE: 49 MMHG | SYSTOLIC BLOOD PRESSURE: 107 MMHG | RESPIRATION RATE: 18 BRPM | HEART RATE: 75 BPM

## 2021-01-01 VITALS
RESPIRATION RATE: 18 BRPM | SYSTOLIC BLOOD PRESSURE: 139 MMHG | HEART RATE: 74 BPM | DIASTOLIC BLOOD PRESSURE: 64 MMHG | TEMPERATURE: 99 F

## 2021-01-01 VITALS
RESPIRATION RATE: 18 BRPM | SYSTOLIC BLOOD PRESSURE: 129 MMHG | DIASTOLIC BLOOD PRESSURE: 53 MMHG | TEMPERATURE: 98 F | HEART RATE: 64 BPM | RESPIRATION RATE: 18 BRPM | TEMPERATURE: 98 F | HEART RATE: 72 BPM | DIASTOLIC BLOOD PRESSURE: 76 MMHG | SYSTOLIC BLOOD PRESSURE: 109 MMHG

## 2021-01-01 VITALS
SYSTOLIC BLOOD PRESSURE: 167 MMHG | TEMPERATURE: 99 F | DIASTOLIC BLOOD PRESSURE: 67 MMHG | RESPIRATION RATE: 18 BRPM | HEART RATE: 70 BPM

## 2021-01-01 VITALS
HEIGHT: 65 IN | HEART RATE: 70 BPM | DIASTOLIC BLOOD PRESSURE: 50 MMHG | HEART RATE: 82 BPM | SYSTOLIC BLOOD PRESSURE: 103 MMHG | SYSTOLIC BLOOD PRESSURE: 160 MMHG | WEIGHT: 162.38 LBS | DIASTOLIC BLOOD PRESSURE: 67 MMHG | BODY MASS INDEX: 27.05 KG/M2 | RESPIRATION RATE: 18 BRPM

## 2021-01-01 VITALS
RESPIRATION RATE: 18 BRPM | HEART RATE: 79 BPM | TEMPERATURE: 98 F | SYSTOLIC BLOOD PRESSURE: 120 MMHG | DIASTOLIC BLOOD PRESSURE: 58 MMHG

## 2021-01-01 VITALS
RESPIRATION RATE: 18 BRPM | TEMPERATURE: 98 F | SYSTOLIC BLOOD PRESSURE: 146 MMHG | DIASTOLIC BLOOD PRESSURE: 65 MMHG | HEART RATE: 59 BPM

## 2021-01-01 VITALS — WEIGHT: 157.63 LBS | BODY MASS INDEX: 26.23 KG/M2

## 2021-01-01 VITALS
OXYGEN SATURATION: 96 % | SYSTOLIC BLOOD PRESSURE: 156 MMHG | HEART RATE: 72 BPM | DIASTOLIC BLOOD PRESSURE: 68 MMHG | TEMPERATURE: 98 F | RESPIRATION RATE: 18 BRPM

## 2021-01-01 VITALS
DIASTOLIC BLOOD PRESSURE: 54 MMHG | RESPIRATION RATE: 18 BRPM | SYSTOLIC BLOOD PRESSURE: 111 MMHG | HEART RATE: 77 BPM | TEMPERATURE: 98 F

## 2021-01-01 DIAGNOSIS — D46.C MDS (MYELODYSPLASTIC SYNDROME) WITH 5Q DELETION: Primary | ICD-10-CM

## 2021-01-01 DIAGNOSIS — D46.9 MDS (MYELODYSPLASTIC SYNDROME): ICD-10-CM

## 2021-01-01 DIAGNOSIS — Z51.11 CHEMOTHERAPY MANAGEMENT, ENCOUNTER FOR: ICD-10-CM

## 2021-01-01 DIAGNOSIS — I10 ESSENTIAL HYPERTENSION: ICD-10-CM

## 2021-01-01 DIAGNOSIS — D46.C MDS (MYELODYSPLASTIC SYNDROME) WITH 5Q DELETION: ICD-10-CM

## 2021-01-01 DIAGNOSIS — D64.9 ANEMIA REQUIRING TRANSFUSIONS: ICD-10-CM

## 2021-01-01 DIAGNOSIS — D64.9 ANEMIA REQUIRING TRANSFUSIONS: Primary | ICD-10-CM

## 2021-01-01 DIAGNOSIS — E11.9 TYPE 2 DIABETES MELLITUS WITHOUT COMPLICATION, UNSPECIFIED WHETHER LONG TERM INSULIN USE: ICD-10-CM

## 2021-01-01 DIAGNOSIS — F43.23 ADJUSTMENT DISORDER WITH MIXED ANXIETY AND DEPRESSED MOOD: ICD-10-CM

## 2021-01-01 DIAGNOSIS — N18.30 CONTROLLED TYPE 2 DIABETES MELLITUS WITH STAGE 3 CHRONIC KIDNEY DISEASE, WITHOUT LONG-TERM CURRENT USE OF INSULIN: ICD-10-CM

## 2021-01-01 DIAGNOSIS — I25.10 CORONARY ARTERY DISEASE INVOLVING NATIVE CORONARY ARTERY OF NATIVE HEART WITHOUT ANGINA PECTORIS: ICD-10-CM

## 2021-01-01 DIAGNOSIS — I25.10 CORONARY ARTERY DISEASE, ANGINA PRESENCE UNSPECIFIED, UNSPECIFIED VESSEL OR LESION TYPE, UNSPECIFIED WHETHER NATIVE OR TRANSPLANTED HEART: ICD-10-CM

## 2021-01-01 DIAGNOSIS — D53.9 MACROCYTIC ANEMIA: ICD-10-CM

## 2021-01-01 DIAGNOSIS — R06.02 SHORTNESS OF BREATH: ICD-10-CM

## 2021-01-01 DIAGNOSIS — E11.9 CONTROLLED TYPE 2 DIABETES MELLITUS WITHOUT COMPLICATION, WITHOUT LONG-TERM CURRENT USE OF INSULIN: ICD-10-CM

## 2021-01-01 DIAGNOSIS — E11.22 CONTROLLED TYPE 2 DIABETES MELLITUS WITH STAGE 3 CHRONIC KIDNEY DISEASE, WITHOUT LONG-TERM CURRENT USE OF INSULIN: ICD-10-CM

## 2021-01-01 DIAGNOSIS — Z51.89 ENCOUNTER FOR BLOOD TRANSFUSION: ICD-10-CM

## 2021-01-01 DIAGNOSIS — K21.9 GASTROESOPHAGEAL REFLUX DISEASE WITHOUT ESOPHAGITIS: ICD-10-CM

## 2021-01-01 DIAGNOSIS — Z74.09 OTHER REDUCED MOBILITY: ICD-10-CM

## 2021-01-01 DIAGNOSIS — Z99.89 DEPENDENCE ON OTHER ENABLING MACHINES AND DEVICES: ICD-10-CM

## 2021-01-01 DIAGNOSIS — D46.9 MYELODYSPLASTIC SYNDROME: ICD-10-CM

## 2021-01-01 DIAGNOSIS — D84.9 IMMUNOSUPPRESSION: ICD-10-CM

## 2021-01-01 DIAGNOSIS — R91.1 LUNG NODULE: ICD-10-CM

## 2021-01-01 DIAGNOSIS — R26.81 UNSTEADY GAIT WHEN WALKING: Primary | ICD-10-CM

## 2021-01-01 DIAGNOSIS — R91.1 LESION OF RIGHT LUNG: ICD-10-CM

## 2021-01-01 DIAGNOSIS — G47.00 INSOMNIA, UNSPECIFIED TYPE: ICD-10-CM

## 2021-01-01 DIAGNOSIS — Z01.818 PRE-OP TESTING: ICD-10-CM

## 2021-01-01 DIAGNOSIS — E44.0 MODERATE MALNUTRITION: ICD-10-CM

## 2021-01-01 DIAGNOSIS — D64.9 SYMPTOMATIC ANEMIA: Primary | ICD-10-CM

## 2021-01-01 DIAGNOSIS — R91.1 LESION OF RIGHT LUNG: Primary | ICD-10-CM

## 2021-01-01 DIAGNOSIS — I10 ESSENTIAL HYPERTENSION: Primary | ICD-10-CM

## 2021-01-01 DIAGNOSIS — M54.32 BILATERAL SCIATICA: ICD-10-CM

## 2021-01-01 DIAGNOSIS — M54.31 BILATERAL SCIATICA: ICD-10-CM

## 2021-01-01 DIAGNOSIS — D75.9 CYTOPENIA: ICD-10-CM

## 2021-01-01 DIAGNOSIS — Z71.3 NUTRITIONAL COUNSELING: ICD-10-CM

## 2021-01-01 DIAGNOSIS — D61.818 PANCYTOPENIA: ICD-10-CM

## 2021-01-01 DIAGNOSIS — I70.0 CALCIFICATION OF AORTA: ICD-10-CM

## 2021-01-01 DIAGNOSIS — M79.10 MYALGIA: ICD-10-CM

## 2021-01-01 DIAGNOSIS — Z00.00 ENCOUNTER FOR PREVENTIVE HEALTH EXAMINATION: Primary | ICD-10-CM

## 2021-01-01 DIAGNOSIS — I65.23 BILATERAL CAROTID ARTERY STENOSIS: ICD-10-CM

## 2021-01-01 DIAGNOSIS — F32.A DEPRESSION, UNSPECIFIED DEPRESSION TYPE: ICD-10-CM

## 2021-01-01 DIAGNOSIS — D64.9 SYMPTOMATIC ANEMIA: ICD-10-CM

## 2021-01-01 DIAGNOSIS — Z79.2 PROPHYLACTIC ANTIBIOTIC: ICD-10-CM

## 2021-01-01 LAB
ABO + RH BLD: NORMAL
ALBUMIN SERPL BCP-MCNC: 3.3 G/DL (ref 3.5–5.2)
ALBUMIN SERPL BCP-MCNC: 3.3 G/DL (ref 3.5–5.2)
ALBUMIN SERPL BCP-MCNC: 3.4 G/DL (ref 3.5–5.2)
ALBUMIN SERPL BCP-MCNC: 3.5 G/DL (ref 3.5–5.2)
ALBUMIN SERPL BCP-MCNC: 3.6 G/DL (ref 3.5–5.2)
ALBUMIN SERPL BCP-MCNC: 3.7 G/DL (ref 3.5–5.2)
ALBUMIN SERPL BCP-MCNC: 3.7 G/DL (ref 3.5–5.2)
ALBUMIN SERPL BCP-MCNC: 3.9 G/DL (ref 3.5–5.2)
ALP SERPL-CCNC: 52 U/L (ref 55–135)
ALP SERPL-CCNC: 52 U/L (ref 55–135)
ALP SERPL-CCNC: 54 U/L (ref 55–135)
ALP SERPL-CCNC: 55 U/L (ref 55–135)
ALP SERPL-CCNC: 57 U/L (ref 55–135)
ALP SERPL-CCNC: 58 U/L (ref 55–135)
ALP SERPL-CCNC: 59 U/L (ref 55–135)
ALP SERPL-CCNC: 60 U/L (ref 55–135)
ALP SERPL-CCNC: 60 U/L (ref 55–135)
ALP SERPL-CCNC: 61 U/L (ref 55–135)
ALP SERPL-CCNC: 62 U/L (ref 55–135)
ALP SERPL-CCNC: 62 U/L (ref 55–135)
ALP SERPL-CCNC: 63 U/L (ref 55–135)
ALP SERPL-CCNC: 64 U/L (ref 55–135)
ALP SERPL-CCNC: 64 U/L (ref 55–135)
ALP SERPL-CCNC: 67 U/L (ref 55–135)
ALP SERPL-CCNC: 68 U/L (ref 55–135)
ALP SERPL-CCNC: 70 U/L (ref 55–135)
ALT SERPL W/O P-5'-P-CCNC: 136 U/L (ref 10–44)
ALT SERPL W/O P-5'-P-CCNC: 28 U/L (ref 10–44)
ALT SERPL W/O P-5'-P-CCNC: 35 U/L (ref 10–44)
ALT SERPL W/O P-5'-P-CCNC: 39 U/L (ref 10–44)
ALT SERPL W/O P-5'-P-CCNC: 41 U/L (ref 10–44)
ALT SERPL W/O P-5'-P-CCNC: 42 U/L (ref 10–44)
ALT SERPL W/O P-5'-P-CCNC: 43 U/L (ref 10–44)
ALT SERPL W/O P-5'-P-CCNC: 44 U/L (ref 10–44)
ALT SERPL W/O P-5'-P-CCNC: 44 U/L (ref 10–44)
ALT SERPL W/O P-5'-P-CCNC: 45 U/L (ref 10–44)
ALT SERPL W/O P-5'-P-CCNC: 45 U/L (ref 10–44)
ALT SERPL W/O P-5'-P-CCNC: 49 U/L (ref 10–44)
ALT SERPL W/O P-5'-P-CCNC: 55 U/L (ref 10–44)
ALT SERPL W/O P-5'-P-CCNC: 58 U/L (ref 10–44)
ALT SERPL W/O P-5'-P-CCNC: 74 U/L (ref 10–44)
ALT SERPL W/O P-5'-P-CCNC: 81 U/L (ref 10–44)
ALT SERPL W/O P-5'-P-CCNC: 83 U/L (ref 10–44)
ALT SERPL W/O P-5'-P-CCNC: 88 U/L (ref 10–44)
ANION GAP SERPL CALC-SCNC: 10 MMOL/L (ref 8–16)
ANION GAP SERPL CALC-SCNC: 11 MMOL/L (ref 8–16)
ANION GAP SERPL CALC-SCNC: 12 MMOL/L (ref 8–16)
ANION GAP SERPL CALC-SCNC: 13 MMOL/L (ref 8–16)
ANION GAP SERPL CALC-SCNC: 7 MMOL/L (ref 8–16)
ANION GAP SERPL CALC-SCNC: 8 MMOL/L (ref 8–16)
ANION GAP SERPL CALC-SCNC: 9 MMOL/L (ref 8–16)
ANISOCYTOSIS BLD QL SMEAR: SLIGHT
AST SERPL-CCNC: 21 U/L (ref 10–40)
AST SERPL-CCNC: 23 U/L (ref 10–40)
AST SERPL-CCNC: 24 U/L (ref 10–40)
AST SERPL-CCNC: 24 U/L (ref 10–40)
AST SERPL-CCNC: 25 U/L (ref 10–40)
AST SERPL-CCNC: 25 U/L (ref 10–40)
AST SERPL-CCNC: 27 U/L (ref 10–40)
AST SERPL-CCNC: 28 U/L (ref 10–40)
AST SERPL-CCNC: 28 U/L (ref 10–40)
AST SERPL-CCNC: 30 U/L (ref 10–40)
AST SERPL-CCNC: 31 U/L (ref 10–40)
AST SERPL-CCNC: 32 U/L (ref 10–40)
AST SERPL-CCNC: 35 U/L (ref 10–40)
AST SERPL-CCNC: 37 U/L (ref 10–40)
AST SERPL-CCNC: 38 U/L (ref 10–40)
AST SERPL-CCNC: 50 U/L (ref 10–40)
AST SERPL-CCNC: 51 U/L (ref 10–40)
AST SERPL-CCNC: 51 U/L (ref 10–40)
AST SERPL-CCNC: 53 U/L (ref 10–40)
AST SERPL-CCNC: 65 U/L (ref 10–40)
BASO STIPL BLD QL SMEAR: ABNORMAL
BASO STIPL BLD QL SMEAR: ABNORMAL
BASOPHILS # BLD AUTO: 0.01 K/UL (ref 0–0.2)
BASOPHILS # BLD AUTO: 0.01 K/UL (ref 0–0.2)
BASOPHILS # BLD AUTO: 0.02 K/UL (ref 0–0.2)
BASOPHILS # BLD AUTO: 0.03 K/UL (ref 0–0.2)
BASOPHILS # BLD AUTO: 0.04 K/UL (ref 0–0.2)
BASOPHILS # BLD AUTO: 0.05 K/UL (ref 0–0.2)
BASOPHILS # BLD AUTO: 0.06 K/UL (ref 0–0.2)
BASOPHILS # BLD AUTO: 0.07 K/UL (ref 0–0.2)
BASOPHILS # BLD AUTO: 0.08 K/UL (ref 0–0.2)
BASOPHILS # BLD AUTO: 0.08 K/UL (ref 0–0.2)
BASOPHILS # BLD AUTO: ABNORMAL K/UL (ref 0–0.2)
BASOPHILS NFR BLD: 0.3 % (ref 0–1.9)
BASOPHILS NFR BLD: 0.4 % (ref 0–1.9)
BASOPHILS NFR BLD: 0.6 % (ref 0–1.9)
BASOPHILS NFR BLD: 0.6 % (ref 0–1.9)
BASOPHILS NFR BLD: 0.7 % (ref 0–1.9)
BASOPHILS NFR BLD: 0.8 % (ref 0–1.9)
BASOPHILS NFR BLD: 0.8 % (ref 0–1.9)
BASOPHILS NFR BLD: 0.9 % (ref 0–1.9)
BASOPHILS NFR BLD: 0.9 % (ref 0–1.9)
BASOPHILS NFR BLD: 1 % (ref 0–1.9)
BASOPHILS NFR BLD: 1 % (ref 0–1.9)
BASOPHILS NFR BLD: 1.2 % (ref 0–1.9)
BASOPHILS NFR BLD: 1.3 % (ref 0–1.9)
BASOPHILS NFR BLD: 1.4 % (ref 0–1.9)
BASOPHILS NFR BLD: 1.5 % (ref 0–1.9)
BASOPHILS NFR BLD: 1.6 % (ref 0–1.9)
BASOPHILS NFR BLD: 1.8 % (ref 0–1.9)
BASOPHILS NFR BLD: 1.8 % (ref 0–1.9)
BASOPHILS NFR BLD: 2.1 % (ref 0–1.9)
BASOPHILS NFR BLD: 2.3 % (ref 0–1.9)
BASOPHILS NFR BLD: 2.4 % (ref 0–1.9)
BASOPHILS NFR BLD: 2.7 % (ref 0–1.9)
BASOPHILS NFR BLD: 2.9 % (ref 0–1.9)
BASOPHILS NFR BLD: 3 % (ref 0–1.9)
BASOPHILS NFR BLD: 4 % (ref 0–1.9)
BILIRUB SERPL-MCNC: 0.3 MG/DL (ref 0.1–1)
BILIRUB SERPL-MCNC: 0.4 MG/DL (ref 0.1–1)
BILIRUB SERPL-MCNC: 0.5 MG/DL (ref 0.1–1)
BILIRUB SERPL-MCNC: 0.6 MG/DL (ref 0.1–1)
BILIRUB SERPL-MCNC: 0.6 MG/DL (ref 0.1–1)
BILIRUB SERPL-MCNC: 0.7 MG/DL (ref 0.1–1)
BLD GP AB SCN CELLS X3 SERPL QL: NORMAL
BLD PROD TYP BPU: NORMAL
BLOOD UNIT EXPIRATION DATE: NORMAL
BLOOD UNIT TYPE CODE: 5100
BLOOD UNIT TYPE CODE: 5100
BLOOD UNIT TYPE CODE: 6200
BLOOD UNIT TYPE: NORMAL
BUN SERPL-MCNC: 14 MG/DL (ref 8–23)
BUN SERPL-MCNC: 15 MG/DL (ref 8–23)
BUN SERPL-MCNC: 16 MG/DL (ref 8–23)
BUN SERPL-MCNC: 16 MG/DL (ref 8–23)
BUN SERPL-MCNC: 17 MG/DL (ref 8–23)
BUN SERPL-MCNC: 17 MG/DL (ref 8–23)
BUN SERPL-MCNC: 18 MG/DL (ref 8–23)
BUN SERPL-MCNC: 18 MG/DL (ref 8–23)
BUN SERPL-MCNC: 19 MG/DL (ref 8–23)
BUN SERPL-MCNC: 19 MG/DL (ref 8–23)
BUN SERPL-MCNC: 20 MG/DL (ref 8–23)
BUN SERPL-MCNC: 22 MG/DL (ref 8–23)
BUN SERPL-MCNC: 22 MG/DL (ref 8–23)
BUN SERPL-MCNC: 23 MG/DL (ref 8–23)
BUN SERPL-MCNC: 27 MG/DL (ref 8–23)
CALCIUM SERPL-MCNC: 10 MG/DL (ref 8.7–10.5)
CALCIUM SERPL-MCNC: 10.1 MG/DL (ref 8.7–10.5)
CALCIUM SERPL-MCNC: 10.4 MG/DL (ref 8.7–10.5)
CALCIUM SERPL-MCNC: 8.8 MG/DL (ref 8.7–10.5)
CALCIUM SERPL-MCNC: 9 MG/DL (ref 8.7–10.5)
CALCIUM SERPL-MCNC: 9.4 MG/DL (ref 8.7–10.5)
CALCIUM SERPL-MCNC: 9.4 MG/DL (ref 8.7–10.5)
CALCIUM SERPL-MCNC: 9.5 MG/DL (ref 8.7–10.5)
CALCIUM SERPL-MCNC: 9.6 MG/DL (ref 8.7–10.5)
CALCIUM SERPL-MCNC: 9.7 MG/DL (ref 8.7–10.5)
CALCIUM SERPL-MCNC: 9.8 MG/DL (ref 8.7–10.5)
CHLORIDE SERPL-SCNC: 100 MMOL/L (ref 95–110)
CHLORIDE SERPL-SCNC: 101 MMOL/L (ref 95–110)
CHLORIDE SERPL-SCNC: 102 MMOL/L (ref 95–110)
CHLORIDE SERPL-SCNC: 102 MMOL/L (ref 95–110)
CHLORIDE SERPL-SCNC: 103 MMOL/L (ref 95–110)
CHLORIDE SERPL-SCNC: 104 MMOL/L (ref 95–110)
CHLORIDE SERPL-SCNC: 98 MMOL/L (ref 95–110)
CHLORIDE SERPL-SCNC: 99 MMOL/L (ref 95–110)
CO2 SERPL-SCNC: 25 MMOL/L (ref 23–29)
CO2 SERPL-SCNC: 26 MMOL/L (ref 23–29)
CO2 SERPL-SCNC: 27 MMOL/L (ref 23–29)
CO2 SERPL-SCNC: 28 MMOL/L (ref 23–29)
CO2 SERPL-SCNC: 29 MMOL/L (ref 23–29)
CO2 SERPL-SCNC: 29 MMOL/L (ref 23–29)
CO2 SERPL-SCNC: 30 MMOL/L (ref 23–29)
CO2 SERPL-SCNC: 32 MMOL/L (ref 23–29)
CODING SYSTEM: NORMAL
CREAT SERPL-MCNC: 0.8 MG/DL (ref 0.5–1.4)
CREAT SERPL-MCNC: 0.9 MG/DL (ref 0.5–1.4)
CREAT SERPL-MCNC: 1 MG/DL (ref 0.5–1.4)
CREAT SERPL-MCNC: 1 MG/DL (ref 0.5–1.4)
CREAT SERPL-MCNC: 1.1 MG/DL (ref 0.5–1.4)
CREAT SERPL-MCNC: 1.2 MG/DL (ref 0.5–1.4)
CREAT SERPL-MCNC: 1.2 MG/DL (ref 0.5–1.4)
DIFFERENTIAL METHOD: ABNORMAL
DISPENSE STATUS: NORMAL
DOHLE BOD BLD QL SMEAR: PRESENT
DOHLE BOD BLD QL SMEAR: PRESENT
EOSINOPHIL # BLD AUTO: 0.1 K/UL (ref 0–0.5)
EOSINOPHIL # BLD AUTO: 0.2 K/UL (ref 0–0.5)
EOSINOPHIL # BLD AUTO: 0.3 K/UL (ref 0–0.5)
EOSINOPHIL # BLD AUTO: 0.4 K/UL (ref 0–0.5)
EOSINOPHIL # BLD AUTO: 0.4 K/UL (ref 0–0.5)
EOSINOPHIL # BLD AUTO: ABNORMAL K/UL (ref 0–0.5)
EOSINOPHIL NFR BLD: 10.4 % (ref 0–8)
EOSINOPHIL NFR BLD: 10.4 % (ref 0–8)
EOSINOPHIL NFR BLD: 3.2 % (ref 0–8)
EOSINOPHIL NFR BLD: 3.3 % (ref 0–8)
EOSINOPHIL NFR BLD: 3.6 % (ref 0–8)
EOSINOPHIL NFR BLD: 3.9 % (ref 0–8)
EOSINOPHIL NFR BLD: 3.9 % (ref 0–8)
EOSINOPHIL NFR BLD: 4 % (ref 0–8)
EOSINOPHIL NFR BLD: 4.4 % (ref 0–8)
EOSINOPHIL NFR BLD: 4.4 % (ref 0–8)
EOSINOPHIL NFR BLD: 4.6 % (ref 0–8)
EOSINOPHIL NFR BLD: 5 % (ref 0–8)
EOSINOPHIL NFR BLD: 5.3 % (ref 0–8)
EOSINOPHIL NFR BLD: 5.6 % (ref 0–8)
EOSINOPHIL NFR BLD: 6 % (ref 0–8)
EOSINOPHIL NFR BLD: 6 % (ref 0–8)
EOSINOPHIL NFR BLD: 6.4 % (ref 0–8)
EOSINOPHIL NFR BLD: 6.5 % (ref 0–8)
EOSINOPHIL NFR BLD: 6.5 % (ref 0–8)
EOSINOPHIL NFR BLD: 7.8 % (ref 0–8)
EOSINOPHIL NFR BLD: 8.6 % (ref 0–8)
EOSINOPHIL NFR BLD: 8.9 % (ref 0–8)
EOSINOPHIL NFR BLD: 9.3 % (ref 0–8)
EOSINOPHIL NFR BLD: 9.6 % (ref 0–8)
EOSINOPHIL NFR BLD: 9.8 % (ref 0–8)
EOSINOPHIL NFR BLD: 9.9 % (ref 0–8)
ERYTHROCYTE [DISTWIDTH] IN BLOOD BY AUTOMATED COUNT: 14.6 % (ref 11.5–14.5)
ERYTHROCYTE [DISTWIDTH] IN BLOOD BY AUTOMATED COUNT: 14.8 % (ref 11.5–14.5)
ERYTHROCYTE [DISTWIDTH] IN BLOOD BY AUTOMATED COUNT: 15 % (ref 11.5–14.5)
ERYTHROCYTE [DISTWIDTH] IN BLOOD BY AUTOMATED COUNT: 15.1 % (ref 11.5–14.5)
ERYTHROCYTE [DISTWIDTH] IN BLOOD BY AUTOMATED COUNT: 15.3 % (ref 11.5–14.5)
ERYTHROCYTE [DISTWIDTH] IN BLOOD BY AUTOMATED COUNT: 15.3 % (ref 11.5–14.5)
ERYTHROCYTE [DISTWIDTH] IN BLOOD BY AUTOMATED COUNT: 15.4 % (ref 11.5–14.5)
ERYTHROCYTE [DISTWIDTH] IN BLOOD BY AUTOMATED COUNT: 15.5 % (ref 11.5–14.5)
ERYTHROCYTE [DISTWIDTH] IN BLOOD BY AUTOMATED COUNT: 15.6 % (ref 11.5–14.5)
ERYTHROCYTE [DISTWIDTH] IN BLOOD BY AUTOMATED COUNT: 15.8 % (ref 11.5–14.5)
ERYTHROCYTE [DISTWIDTH] IN BLOOD BY AUTOMATED COUNT: 15.9 % (ref 11.5–14.5)
ERYTHROCYTE [DISTWIDTH] IN BLOOD BY AUTOMATED COUNT: 16 % (ref 11.5–14.5)
ERYTHROCYTE [DISTWIDTH] IN BLOOD BY AUTOMATED COUNT: 16 % (ref 11.5–14.5)
ERYTHROCYTE [DISTWIDTH] IN BLOOD BY AUTOMATED COUNT: 16.1 % (ref 11.5–14.5)
ERYTHROCYTE [DISTWIDTH] IN BLOOD BY AUTOMATED COUNT: 16.2 % (ref 11.5–14.5)
ERYTHROCYTE [DISTWIDTH] IN BLOOD BY AUTOMATED COUNT: 16.2 % (ref 11.5–14.5)
ERYTHROCYTE [DISTWIDTH] IN BLOOD BY AUTOMATED COUNT: 16.3 % (ref 11.5–14.5)
ERYTHROCYTE [DISTWIDTH] IN BLOOD BY AUTOMATED COUNT: 16.5 % (ref 11.5–14.5)
ERYTHROCYTE [DISTWIDTH] IN BLOOD BY AUTOMATED COUNT: 16.9 % (ref 11.5–14.5)
ERYTHROCYTE [DISTWIDTH] IN BLOOD BY AUTOMATED COUNT: 17 % (ref 11.5–14.5)
ERYTHROCYTE [DISTWIDTH] IN BLOOD BY AUTOMATED COUNT: 17.2 % (ref 11.5–14.5)
ERYTHROCYTE [DISTWIDTH] IN BLOOD BY AUTOMATED COUNT: 17.2 % (ref 11.5–14.5)
ERYTHROCYTE [DISTWIDTH] IN BLOOD BY AUTOMATED COUNT: 17.3 % (ref 11.5–14.5)
ERYTHROCYTE [DISTWIDTH] IN BLOOD BY AUTOMATED COUNT: 17.4 % (ref 11.5–14.5)
ERYTHROCYTE [DISTWIDTH] IN BLOOD BY AUTOMATED COUNT: 17.6 % (ref 11.5–14.5)
EST. GFR  (AFRICAN AMERICAN): 52.5 ML/MIN/1.73 M^2
EST. GFR  (AFRICAN AMERICAN): 52.9 ML/MIN/1.73 M^2
EST. GFR  (AFRICAN AMERICAN): 58.4 ML/MIN/1.73 M^2
EST. GFR  (AFRICAN AMERICAN): >60 ML/MIN/1.73 M^2
EST. GFR  (NON AFRICAN AMERICAN): 45.6 ML/MIN/1.73 M^2
EST. GFR  (NON AFRICAN AMERICAN): 45.9 ML/MIN/1.73 M^2
EST. GFR  (NON AFRICAN AMERICAN): 50.6 ML/MIN/1.73 M^2
EST. GFR  (NON AFRICAN AMERICAN): 56.8 ML/MIN/1.73 M^2
EST. GFR  (NON AFRICAN AMERICAN): 56.8 ML/MIN/1.73 M^2
EST. GFR  (NON AFRICAN AMERICAN): >60 ML/MIN/1.73 M^2
GIANT PLATELETS BLD QL SMEAR: PRESENT
GLUCOSE SERPL-MCNC: 108 MG/DL (ref 70–110)
GLUCOSE SERPL-MCNC: 108 MG/DL (ref 70–110)
GLUCOSE SERPL-MCNC: 109 MG/DL (ref 70–110)
GLUCOSE SERPL-MCNC: 111 MG/DL (ref 70–110)
GLUCOSE SERPL-MCNC: 114 MG/DL (ref 70–110)
GLUCOSE SERPL-MCNC: 115 MG/DL (ref 70–110)
GLUCOSE SERPL-MCNC: 116 MG/DL (ref 70–110)
GLUCOSE SERPL-MCNC: 120 MG/DL (ref 70–110)
GLUCOSE SERPL-MCNC: 121 MG/DL (ref 70–110)
GLUCOSE SERPL-MCNC: 125 MG/DL (ref 70–110)
GLUCOSE SERPL-MCNC: 129 MG/DL (ref 70–110)
GLUCOSE SERPL-MCNC: 130 MG/DL (ref 70–110)
GLUCOSE SERPL-MCNC: 153 MG/DL (ref 70–110)
GLUCOSE SERPL-MCNC: 155 MG/DL (ref 70–110)
GLUCOSE SERPL-MCNC: 95 MG/DL (ref 70–110)
GLUCOSE SERPL-MCNC: 98 MG/DL (ref 70–110)
HCT VFR BLD AUTO: 17.2 % (ref 37–48.5)
HCT VFR BLD AUTO: 17.3 % (ref 37–48.5)
HCT VFR BLD AUTO: 18.2 % (ref 37–48.5)
HCT VFR BLD AUTO: 18.3 % (ref 37–48.5)
HCT VFR BLD AUTO: 20 % (ref 37–48.5)
HCT VFR BLD AUTO: 20.2 % (ref 37–48.5)
HCT VFR BLD AUTO: 20.4 % (ref 37–48.5)
HCT VFR BLD AUTO: 20.5 % (ref 37–48.5)
HCT VFR BLD AUTO: 20.6 % (ref 37–48.5)
HCT VFR BLD AUTO: 20.7 % (ref 37–48.5)
HCT VFR BLD AUTO: 20.9 % (ref 37–48.5)
HCT VFR BLD AUTO: 21 % (ref 37–48.5)
HCT VFR BLD AUTO: 21.1 % (ref 37–48.5)
HCT VFR BLD AUTO: 21.2 % (ref 37–48.5)
HCT VFR BLD AUTO: 21.5 % (ref 37–48.5)
HCT VFR BLD AUTO: 21.7 % (ref 37–48.5)
HCT VFR BLD AUTO: 22.2 % (ref 37–48.5)
HCT VFR BLD AUTO: 22.2 % (ref 37–48.5)
HCT VFR BLD AUTO: 22.3 % (ref 37–48.5)
HCT VFR BLD AUTO: 22.7 % (ref 37–48.5)
HCT VFR BLD AUTO: 22.9 % (ref 37–48.5)
HCT VFR BLD AUTO: 22.9 % (ref 37–48.5)
HCT VFR BLD AUTO: 23 % (ref 37–48.5)
HCT VFR BLD AUTO: 23.3 % (ref 37–48.5)
HCT VFR BLD AUTO: 23.4 % (ref 37–48.5)
HCT VFR BLD AUTO: 24.7 % (ref 37–48.5)
HCT VFR BLD AUTO: 25.3 % (ref 37–48.5)
HCT VFR BLD AUTO: 25.6 % (ref 37–48.5)
HCT VFR BLD AUTO: 25.8 % (ref 37–48.5)
HCT VFR BLD AUTO: 26.1 % (ref 37–48.5)
HGB BLD-MCNC: 5.5 G/DL (ref 12–16)
HGB BLD-MCNC: 5.6 G/DL (ref 12–16)
HGB BLD-MCNC: 6 G/DL (ref 12–16)
HGB BLD-MCNC: 6.1 G/DL (ref 12–16)
HGB BLD-MCNC: 6.3 G/DL (ref 12–16)
HGB BLD-MCNC: 6.4 G/DL (ref 12–16)
HGB BLD-MCNC: 6.5 G/DL (ref 12–16)
HGB BLD-MCNC: 6.5 G/DL (ref 12–16)
HGB BLD-MCNC: 6.7 G/DL (ref 12–16)
HGB BLD-MCNC: 6.9 G/DL (ref 12–16)
HGB BLD-MCNC: 7 G/DL (ref 12–16)
HGB BLD-MCNC: 7.1 G/DL (ref 12–16)
HGB BLD-MCNC: 7.2 G/DL (ref 12–16)
HGB BLD-MCNC: 7.4 G/DL (ref 12–16)
HGB BLD-MCNC: 7.7 G/DL (ref 12–16)
HGB BLD-MCNC: 8 G/DL (ref 12–16)
HGB BLD-MCNC: 8.2 G/DL (ref 12–16)
HGB BLD-MCNC: 8.3 G/DL (ref 12–16)
HYPOCHROMIA BLD QL SMEAR: ABNORMAL
IMM GRANULOCYTES # BLD AUTO: 0.02 K/UL (ref 0–0.04)
IMM GRANULOCYTES # BLD AUTO: 0.02 K/UL (ref 0–0.04)
IMM GRANULOCYTES # BLD AUTO: 0.03 K/UL (ref 0–0.04)
IMM GRANULOCYTES # BLD AUTO: 0.04 K/UL (ref 0–0.04)
IMM GRANULOCYTES # BLD AUTO: 0.05 K/UL (ref 0–0.04)
IMM GRANULOCYTES # BLD AUTO: 0.06 K/UL (ref 0–0.04)
IMM GRANULOCYTES # BLD AUTO: 0.06 K/UL (ref 0–0.04)
IMM GRANULOCYTES # BLD AUTO: 0.07 K/UL (ref 0–0.04)
IMM GRANULOCYTES # BLD AUTO: 0.08 K/UL (ref 0–0.04)
IMM GRANULOCYTES # BLD AUTO: 0.09 K/UL (ref 0–0.04)
IMM GRANULOCYTES # BLD AUTO: ABNORMAL K/UL (ref 0–0.04)
IMM GRANULOCYTES NFR BLD AUTO: 0.7 % (ref 0–0.5)
IMM GRANULOCYTES NFR BLD AUTO: 0.8 % (ref 0–0.5)
IMM GRANULOCYTES NFR BLD AUTO: 0.9 % (ref 0–0.5)
IMM GRANULOCYTES NFR BLD AUTO: 1.1 % (ref 0–0.5)
IMM GRANULOCYTES NFR BLD AUTO: 1.2 % (ref 0–0.5)
IMM GRANULOCYTES NFR BLD AUTO: 1.3 % (ref 0–0.5)
IMM GRANULOCYTES NFR BLD AUTO: 1.5 % (ref 0–0.5)
IMM GRANULOCYTES NFR BLD AUTO: 1.6 % (ref 0–0.5)
IMM GRANULOCYTES NFR BLD AUTO: 1.6 % (ref 0–0.5)
IMM GRANULOCYTES NFR BLD AUTO: 1.8 % (ref 0–0.5)
IMM GRANULOCYTES NFR BLD AUTO: 1.8 % (ref 0–0.5)
IMM GRANULOCYTES NFR BLD AUTO: 1.9 % (ref 0–0.5)
IMM GRANULOCYTES NFR BLD AUTO: 2.1 % (ref 0–0.5)
IMM GRANULOCYTES NFR BLD AUTO: 2.3 % (ref 0–0.5)
IMM GRANULOCYTES NFR BLD AUTO: 2.5 % (ref 0–0.5)
IMM GRANULOCYTES NFR BLD AUTO: 2.8 % (ref 0–0.5)
IMM GRANULOCYTES NFR BLD AUTO: ABNORMAL % (ref 0–0.5)
INR PPP: 1 (ref 0.8–1.2)
LYMPHOCYTES # BLD AUTO: 0.6 K/UL (ref 1–4.8)
LYMPHOCYTES # BLD AUTO: 0.7 K/UL (ref 1–4.8)
LYMPHOCYTES # BLD AUTO: 0.8 K/UL (ref 1–4.8)
LYMPHOCYTES # BLD AUTO: 0.9 K/UL (ref 1–4.8)
LYMPHOCYTES # BLD AUTO: 1 K/UL (ref 1–4.8)
LYMPHOCYTES # BLD AUTO: 1.1 K/UL (ref 1–4.8)
LYMPHOCYTES # BLD AUTO: ABNORMAL K/UL (ref 1–4.8)
LYMPHOCYTES NFR BLD: 20.1 % (ref 18–48)
LYMPHOCYTES NFR BLD: 23 % (ref 18–48)
LYMPHOCYTES NFR BLD: 23.8 % (ref 18–48)
LYMPHOCYTES NFR BLD: 23.9 % (ref 18–48)
LYMPHOCYTES NFR BLD: 24.2 % (ref 18–48)
LYMPHOCYTES NFR BLD: 24.2 % (ref 18–48)
LYMPHOCYTES NFR BLD: 24.3 % (ref 18–48)
LYMPHOCYTES NFR BLD: 25.1 % (ref 18–48)
LYMPHOCYTES NFR BLD: 25.2 % (ref 18–48)
LYMPHOCYTES NFR BLD: 25.3 % (ref 18–48)
LYMPHOCYTES NFR BLD: 25.4 % (ref 18–48)
LYMPHOCYTES NFR BLD: 26.1 % (ref 18–48)
LYMPHOCYTES NFR BLD: 26.3 % (ref 18–48)
LYMPHOCYTES NFR BLD: 27.6 % (ref 18–48)
LYMPHOCYTES NFR BLD: 27.8 % (ref 18–48)
LYMPHOCYTES NFR BLD: 27.9 % (ref 18–48)
LYMPHOCYTES NFR BLD: 28.3 % (ref 18–48)
LYMPHOCYTES NFR BLD: 28.6 % (ref 18–48)
LYMPHOCYTES NFR BLD: 30 % (ref 18–48)
LYMPHOCYTES NFR BLD: 30.3 % (ref 18–48)
LYMPHOCYTES NFR BLD: 30.5 % (ref 18–48)
LYMPHOCYTES NFR BLD: 31.3 % (ref 18–48)
LYMPHOCYTES NFR BLD: 31.9 % (ref 18–48)
LYMPHOCYTES NFR BLD: 33.3 % (ref 18–48)
LYMPHOCYTES NFR BLD: 36 % (ref 18–48)
LYMPHOCYTES NFR BLD: 37.5 % (ref 18–48)
LYMPHOCYTES NFR BLD: 38.9 % (ref 18–48)
LYMPHOCYTES NFR BLD: 39.6 % (ref 18–48)
LYMPHOCYTES NFR BLD: 41 % (ref 18–48)
LYMPHOCYTES NFR BLD: 5 % (ref 18–48)
MCH RBC QN AUTO: 28.7 PG (ref 27–31)
MCH RBC QN AUTO: 28.8 PG (ref 27–31)
MCH RBC QN AUTO: 29.5 PG (ref 27–31)
MCH RBC QN AUTO: 29.7 PG (ref 27–31)
MCH RBC QN AUTO: 29.8 PG (ref 27–31)
MCH RBC QN AUTO: 29.9 PG (ref 27–31)
MCH RBC QN AUTO: 29.9 PG (ref 27–31)
MCH RBC QN AUTO: 30 PG (ref 27–31)
MCH RBC QN AUTO: 30 PG (ref 27–31)
MCH RBC QN AUTO: 30.1 PG (ref 27–31)
MCH RBC QN AUTO: 30.1 PG (ref 27–31)
MCH RBC QN AUTO: 30.3 PG (ref 27–31)
MCH RBC QN AUTO: 30.5 PG (ref 27–31)
MCH RBC QN AUTO: 30.6 PG (ref 27–31)
MCH RBC QN AUTO: 30.7 PG (ref 27–31)
MCH RBC QN AUTO: 30.7 PG (ref 27–31)
MCH RBC QN AUTO: 30.9 PG (ref 27–31)
MCH RBC QN AUTO: 30.9 PG (ref 27–31)
MCH RBC QN AUTO: 31.3 PG (ref 27–31)
MCH RBC QN AUTO: 31.3 PG (ref 27–31)
MCH RBC QN AUTO: 31.6 PG (ref 27–31)
MCH RBC QN AUTO: 31.7 PG (ref 27–31)
MCH RBC QN AUTO: 31.8 PG (ref 27–31)
MCH RBC QN AUTO: 31.8 PG (ref 27–31)
MCH RBC QN AUTO: 31.9 PG (ref 27–31)
MCH RBC QN AUTO: 32 PG (ref 27–31)
MCH RBC QN AUTO: 32.5 PG (ref 27–31)
MCH RBC QN AUTO: 33.2 PG (ref 27–31)
MCHC RBC AUTO-ENTMCNC: 31.4 G/DL (ref 32–36)
MCHC RBC AUTO-ENTMCNC: 31.4 G/DL (ref 32–36)
MCHC RBC AUTO-ENTMCNC: 31.5 G/DL (ref 32–36)
MCHC RBC AUTO-ENTMCNC: 31.6 G/DL (ref 32–36)
MCHC RBC AUTO-ENTMCNC: 31.7 G/DL (ref 32–36)
MCHC RBC AUTO-ENTMCNC: 31.7 G/DL (ref 32–36)
MCHC RBC AUTO-ENTMCNC: 31.8 G/DL (ref 32–36)
MCHC RBC AUTO-ENTMCNC: 32.2 G/DL (ref 32–36)
MCHC RBC AUTO-ENTMCNC: 32.2 G/DL (ref 32–36)
MCHC RBC AUTO-ENTMCNC: 32.3 G/DL (ref 32–36)
MCHC RBC AUTO-ENTMCNC: 32.3 G/DL (ref 32–36)
MCHC RBC AUTO-ENTMCNC: 32.4 G/DL (ref 32–36)
MCHC RBC AUTO-ENTMCNC: 32.5 G/DL (ref 32–36)
MCHC RBC AUTO-ENTMCNC: 32.6 G/DL (ref 32–36)
MCHC RBC AUTO-ENTMCNC: 32.6 G/DL (ref 32–36)
MCHC RBC AUTO-ENTMCNC: 32.8 G/DL (ref 32–36)
MCHC RBC AUTO-ENTMCNC: 32.9 G/DL (ref 32–36)
MCHC RBC AUTO-ENTMCNC: 33 G/DL (ref 32–36)
MCHC RBC AUTO-ENTMCNC: 33.2 G/DL (ref 32–36)
MCHC RBC AUTO-ENTMCNC: 33.3 G/DL (ref 32–36)
MCHC RBC AUTO-ENTMCNC: 33.6 G/DL (ref 32–36)
MCHC RBC AUTO-ENTMCNC: 33.8 G/DL (ref 32–36)
MCV RBC AUTO: 100 FL (ref 82–98)
MCV RBC AUTO: 101 FL (ref 82–98)
MCV RBC AUTO: 89 FL (ref 82–98)
MCV RBC AUTO: 91 FL (ref 82–98)
MCV RBC AUTO: 92 FL (ref 82–98)
MCV RBC AUTO: 93 FL (ref 82–98)
MCV RBC AUTO: 94 FL (ref 82–98)
MCV RBC AUTO: 95 FL (ref 82–98)
MCV RBC AUTO: 96 FL (ref 82–98)
MCV RBC AUTO: 97 FL (ref 82–98)
MCV RBC AUTO: 97 FL (ref 82–98)
MCV RBC AUTO: 98 FL (ref 82–98)
MONOCYTES # BLD AUTO: 0 K/UL (ref 0.3–1)
MONOCYTES # BLD AUTO: 0 K/UL (ref 0.3–1)
MONOCYTES # BLD AUTO: 0.1 K/UL (ref 0.3–1)
MONOCYTES # BLD AUTO: 0.2 K/UL (ref 0.3–1)
MONOCYTES # BLD AUTO: 0.3 K/UL (ref 0.3–1)
MONOCYTES # BLD AUTO: 0.3 K/UL (ref 0.3–1)
MONOCYTES # BLD AUTO: ABNORMAL K/UL (ref 0.3–1)
MONOCYTES NFR BLD: 1.3 % (ref 4–15)
MONOCYTES NFR BLD: 1.3 % (ref 4–15)
MONOCYTES NFR BLD: 1.6 % (ref 4–15)
MONOCYTES NFR BLD: 1.7 % (ref 4–15)
MONOCYTES NFR BLD: 1.8 % (ref 4–15)
MONOCYTES NFR BLD: 10 % (ref 4–15)
MONOCYTES NFR BLD: 2 % (ref 4–15)
MONOCYTES NFR BLD: 2.1 % (ref 4–15)
MONOCYTES NFR BLD: 2.2 % (ref 4–15)
MONOCYTES NFR BLD: 2.3 % (ref 4–15)
MONOCYTES NFR BLD: 2.3 % (ref 4–15)
MONOCYTES NFR BLD: 2.4 % (ref 4–15)
MONOCYTES NFR BLD: 2.4 % (ref 4–15)
MONOCYTES NFR BLD: 2.9 % (ref 4–15)
MONOCYTES NFR BLD: 3.1 % (ref 4–15)
MONOCYTES NFR BLD: 3.2 % (ref 4–15)
MONOCYTES NFR BLD: 3.3 % (ref 4–15)
MONOCYTES NFR BLD: 4 % (ref 4–15)
MONOCYTES NFR BLD: 4.1 % (ref 4–15)
MONOCYTES NFR BLD: 4.6 % (ref 4–15)
MONOCYTES NFR BLD: 5 % (ref 4–15)
MONOCYTES NFR BLD: 5.5 % (ref 4–15)
MONOCYTES NFR BLD: 5.6 % (ref 4–15)
MONOCYTES NFR BLD: 5.7 % (ref 4–15)
MONOCYTES NFR BLD: 5.7 % (ref 4–15)
MONOCYTES NFR BLD: 6.3 % (ref 4–15)
MONOCYTES NFR BLD: 6.9 % (ref 4–15)
MONOCYTES NFR BLD: 7.2 % (ref 4–15)
MONOCYTES NFR BLD: 7.3 % (ref 4–15)
MONOCYTES NFR BLD: 8 % (ref 4–15)
NEUTROPHILS # BLD AUTO: 0.8 K/UL (ref 1.8–7.7)
NEUTROPHILS # BLD AUTO: 0.9 K/UL (ref 1.8–7.7)
NEUTROPHILS # BLD AUTO: 1 K/UL (ref 1.8–7.7)
NEUTROPHILS # BLD AUTO: 1 K/UL (ref 1.8–7.7)
NEUTROPHILS # BLD AUTO: 1.1 K/UL (ref 1.8–7.7)
NEUTROPHILS # BLD AUTO: 1.3 K/UL (ref 1.8–7.7)
NEUTROPHILS # BLD AUTO: 1.5 K/UL (ref 1.8–7.7)
NEUTROPHILS # BLD AUTO: 1.6 K/UL (ref 1.8–7.7)
NEUTROPHILS # BLD AUTO: 1.7 K/UL (ref 1.8–7.7)
NEUTROPHILS # BLD AUTO: 1.8 K/UL (ref 1.8–7.7)
NEUTROPHILS # BLD AUTO: 1.9 K/UL (ref 1.8–7.7)
NEUTROPHILS # BLD AUTO: 2 K/UL (ref 1.8–7.7)
NEUTROPHILS # BLD AUTO: 2.1 K/UL (ref 1.8–7.7)
NEUTROPHILS # BLD AUTO: 2.2 K/UL (ref 1.8–7.7)
NEUTROPHILS # BLD AUTO: 2.4 K/UL (ref 1.8–7.7)
NEUTROPHILS # BLD AUTO: 2.5 K/UL (ref 1.8–7.7)
NEUTROPHILS # BLD AUTO: 2.7 K/UL (ref 1.8–7.7)
NEUTROPHILS # BLD AUTO: 2.8 K/UL (ref 1.8–7.7)
NEUTROPHILS NFR BLD: 38.1 % (ref 38–73)
NEUTROPHILS NFR BLD: 41.5 % (ref 38–73)
NEUTROPHILS NFR BLD: 41.6 % (ref 38–73)
NEUTROPHILS NFR BLD: 47.3 % (ref 38–73)
NEUTROPHILS NFR BLD: 51.4 % (ref 38–73)
NEUTROPHILS NFR BLD: 51.5 % (ref 38–73)
NEUTROPHILS NFR BLD: 51.9 % (ref 38–73)
NEUTROPHILS NFR BLD: 51.9 % (ref 38–73)
NEUTROPHILS NFR BLD: 52.2 % (ref 38–73)
NEUTROPHILS NFR BLD: 52.3 % (ref 38–73)
NEUTROPHILS NFR BLD: 53 % (ref 38–73)
NEUTROPHILS NFR BLD: 56.9 % (ref 38–73)
NEUTROPHILS NFR BLD: 56.9 % (ref 38–73)
NEUTROPHILS NFR BLD: 59.2 % (ref 38–73)
NEUTROPHILS NFR BLD: 59.3 % (ref 38–73)
NEUTROPHILS NFR BLD: 61.2 % (ref 38–73)
NEUTROPHILS NFR BLD: 61.5 % (ref 38–73)
NEUTROPHILS NFR BLD: 61.6 % (ref 38–73)
NEUTROPHILS NFR BLD: 63.8 % (ref 38–73)
NEUTROPHILS NFR BLD: 64.1 % (ref 38–73)
NEUTROPHILS NFR BLD: 64.3 % (ref 38–73)
NEUTROPHILS NFR BLD: 65.7 % (ref 38–73)
NEUTROPHILS NFR BLD: 66 % (ref 38–73)
NEUTROPHILS NFR BLD: 66.8 % (ref 38–73)
NEUTROPHILS NFR BLD: 67.4 % (ref 38–73)
NEUTROPHILS NFR BLD: 68 % (ref 38–73)
NEUTROPHILS NFR BLD: 68.3 % (ref 38–73)
NEUTROPHILS NFR BLD: 69.1 % (ref 38–73)
NEUTROPHILS NFR BLD: 72.7 % (ref 38–73)
NEUTROPHILS NFR BLD: 79 % (ref 38–73)
NEUTS BAND NFR BLD MANUAL: 1 %
NRBC BLD-RTO: 0 /100 WBC
NUM UNITS TRANS PACKED RBC: NORMAL
OVALOCYTES BLD QL SMEAR: ABNORMAL
PLATELET # BLD AUTO: 107 K/UL (ref 150–450)
PLATELET # BLD AUTO: 111 K/UL (ref 150–450)
PLATELET # BLD AUTO: 113 K/UL (ref 150–450)
PLATELET # BLD AUTO: 130 K/UL (ref 150–450)
PLATELET # BLD AUTO: 130 K/UL (ref 150–450)
PLATELET # BLD AUTO: 131 K/UL (ref 150–450)
PLATELET # BLD AUTO: 139 K/UL (ref 150–450)
PLATELET # BLD AUTO: 139 K/UL (ref 150–450)
PLATELET # BLD AUTO: 145 K/UL (ref 150–450)
PLATELET # BLD AUTO: 151 K/UL (ref 150–450)
PLATELET # BLD AUTO: 163 K/UL (ref 150–450)
PLATELET # BLD AUTO: 179 K/UL (ref 150–450)
PLATELET # BLD AUTO: 187 K/UL (ref 150–450)
PLATELET # BLD AUTO: 202 K/UL (ref 150–450)
PLATELET # BLD AUTO: 202 K/UL (ref 150–450)
PLATELET # BLD AUTO: 206 K/UL (ref 150–450)
PLATELET # BLD AUTO: 227 K/UL (ref 150–450)
PLATELET # BLD AUTO: 245 K/UL (ref 150–450)
PLATELET # BLD AUTO: 260 K/UL (ref 150–450)
PLATELET # BLD AUTO: 264 K/UL (ref 150–450)
PLATELET # BLD AUTO: 289 K/UL (ref 150–450)
PLATELET # BLD AUTO: 298 K/UL (ref 150–450)
PLATELET # BLD AUTO: 308 K/UL (ref 150–450)
PLATELET # BLD AUTO: 346 K/UL (ref 150–450)
PLATELET # BLD AUTO: 359 K/UL (ref 150–450)
PLATELET # BLD AUTO: 373 K/UL (ref 150–450)
PLATELET # BLD AUTO: 380 K/UL (ref 150–450)
PLATELET # BLD AUTO: 382 K/UL (ref 150–450)
PLATELET # BLD AUTO: 390 K/UL (ref 150–450)
PLATELET # BLD AUTO: 99 K/UL (ref 150–450)
PLATELET BLD QL SMEAR: ABNORMAL
PMV BLD AUTO: 11 FL (ref 9.2–12.9)
PMV BLD AUTO: 11.1 FL (ref 9.2–12.9)
PMV BLD AUTO: 11.2 FL (ref 9.2–12.9)
PMV BLD AUTO: 11.3 FL (ref 9.2–12.9)
PMV BLD AUTO: 11.3 FL (ref 9.2–12.9)
PMV BLD AUTO: 11.5 FL (ref 9.2–12.9)
PMV BLD AUTO: 11.6 FL (ref 9.2–12.9)
PMV BLD AUTO: 11.6 FL (ref 9.2–12.9)
PMV BLD AUTO: 11.7 FL (ref 9.2–12.9)
PMV BLD AUTO: 11.8 FL (ref 9.2–12.9)
PMV BLD AUTO: 11.9 FL (ref 9.2–12.9)
PMV BLD AUTO: 11.9 FL (ref 9.2–12.9)
PMV BLD AUTO: 12 FL (ref 9.2–12.9)
PMV BLD AUTO: 12.1 FL (ref 9.2–12.9)
POCT GLUCOSE: 114 MG/DL (ref 70–110)
POCT GLUCOSE: 118 MG/DL (ref 70–110)
POIKILOCYTOSIS BLD QL SMEAR: SLIGHT
POLYCHROMASIA BLD QL SMEAR: ABNORMAL
POTASSIUM SERPL-SCNC: 3.5 MMOL/L (ref 3.5–5.1)
POTASSIUM SERPL-SCNC: 3.6 MMOL/L (ref 3.5–5.1)
POTASSIUM SERPL-SCNC: 3.7 MMOL/L (ref 3.5–5.1)
POTASSIUM SERPL-SCNC: 3.7 MMOL/L (ref 3.5–5.1)
POTASSIUM SERPL-SCNC: 3.8 MMOL/L (ref 3.5–5.1)
POTASSIUM SERPL-SCNC: 3.8 MMOL/L (ref 3.5–5.1)
POTASSIUM SERPL-SCNC: 3.9 MMOL/L (ref 3.5–5.1)
POTASSIUM SERPL-SCNC: 4.1 MMOL/L (ref 3.5–5.1)
POTASSIUM SERPL-SCNC: 4.2 MMOL/L (ref 3.5–5.1)
POTASSIUM SERPL-SCNC: 4.2 MMOL/L (ref 3.5–5.1)
POTASSIUM SERPL-SCNC: 4.3 MMOL/L (ref 3.5–5.1)
POTASSIUM SERPL-SCNC: 4.4 MMOL/L (ref 3.5–5.1)
POTASSIUM SERPL-SCNC: 4.4 MMOL/L (ref 3.5–5.1)
POTASSIUM SERPL-SCNC: 4.6 MMOL/L (ref 3.5–5.1)
POTASSIUM SERPL-SCNC: 4.7 MMOL/L (ref 3.5–5.1)
POTASSIUM SERPL-SCNC: 4.8 MMOL/L (ref 3.5–5.1)
PROT SERPL-MCNC: 6.4 G/DL (ref 6–8.4)
PROT SERPL-MCNC: 6.5 G/DL (ref 6–8.4)
PROT SERPL-MCNC: 6.5 G/DL (ref 6–8.4)
PROT SERPL-MCNC: 6.6 G/DL (ref 6–8.4)
PROT SERPL-MCNC: 6.7 G/DL (ref 6–8.4)
PROT SERPL-MCNC: 6.9 G/DL (ref 6–8.4)
PROT SERPL-MCNC: 7 G/DL (ref 6–8.4)
PROT SERPL-MCNC: 7.2 G/DL (ref 6–8.4)
PROT SERPL-MCNC: 7.3 G/DL (ref 6–8.4)
PROT SERPL-MCNC: 7.3 G/DL (ref 6–8.4)
PROT SERPL-MCNC: 7.4 G/DL (ref 6–8.4)
PROT SERPL-MCNC: 7.5 G/DL (ref 6–8.4)
PROTHROMBIN TIME: 10.9 SEC (ref 9–12.5)
RBC # BLD AUTO: 1.76 M/UL (ref 4–5.4)
RBC # BLD AUTO: 1.83 M/UL (ref 4–5.4)
RBC # BLD AUTO: 1.91 M/UL (ref 4–5.4)
RBC # BLD AUTO: 1.97 M/UL (ref 4–5.4)
RBC # BLD AUTO: 2.05 M/UL (ref 4–5.4)
RBC # BLD AUTO: 2.08 M/UL (ref 4–5.4)
RBC # BLD AUTO: 2.12 M/UL (ref 4–5.4)
RBC # BLD AUTO: 2.2 M/UL (ref 4–5.4)
RBC # BLD AUTO: 2.22 M/UL (ref 4–5.4)
RBC # BLD AUTO: 2.23 M/UL (ref 4–5.4)
RBC # BLD AUTO: 2.24 M/UL (ref 4–5.4)
RBC # BLD AUTO: 2.25 M/UL (ref 4–5.4)
RBC # BLD AUTO: 2.26 M/UL (ref 4–5.4)
RBC # BLD AUTO: 2.28 M/UL (ref 4–5.4)
RBC # BLD AUTO: 2.28 M/UL (ref 4–5.4)
RBC # BLD AUTO: 2.33 M/UL (ref 4–5.4)
RBC # BLD AUTO: 2.39 M/UL (ref 4–5.4)
RBC # BLD AUTO: 2.41 M/UL (ref 4–5.4)
RBC # BLD AUTO: 2.41 M/UL (ref 4–5.4)
RBC # BLD AUTO: 2.42 M/UL (ref 4–5.4)
RBC # BLD AUTO: 2.48 M/UL (ref 4–5.4)
RBC # BLD AUTO: 2.57 M/UL (ref 4–5.4)
RBC # BLD AUTO: 2.58 M/UL (ref 4–5.4)
RBC # BLD AUTO: 2.64 M/UL (ref 4–5.4)
RBC # BLD AUTO: 2.7 M/UL (ref 4–5.4)
RBC # BLD AUTO: 2.74 M/UL (ref 4–5.4)
RBC # BLD AUTO: 2.76 M/UL (ref 4–5.4)
RBC # BLD AUTO: 2.77 M/UL (ref 4–5.4)
SARS-COV-2 RNA RESP QL NAA+PROBE: NOT DETECTED
SARS-COV-2- CYCLE NUMBER: NORMAL
SCHISTOCYTES BLD QL SMEAR: ABNORMAL
SODIUM SERPL-SCNC: 134 MMOL/L (ref 136–145)
SODIUM SERPL-SCNC: 135 MMOL/L (ref 136–145)
SODIUM SERPL-SCNC: 136 MMOL/L (ref 136–145)
SODIUM SERPL-SCNC: 137 MMOL/L (ref 136–145)
SODIUM SERPL-SCNC: 138 MMOL/L (ref 136–145)
SODIUM SERPL-SCNC: 139 MMOL/L (ref 136–145)
SODIUM SERPL-SCNC: 139 MMOL/L (ref 136–145)
SODIUM SERPL-SCNC: 140 MMOL/L (ref 136–145)
SODIUM SERPL-SCNC: 141 MMOL/L (ref 136–145)
SPHEROCYTES BLD QL SMEAR: ABNORMAL
WBC # BLD AUTO: 1.82 K/UL (ref 3.9–12.7)
WBC # BLD AUTO: 1.91 K/UL (ref 3.9–12.7)
WBC # BLD AUTO: 1.92 K/UL (ref 3.9–12.7)
WBC # BLD AUTO: 1.98 K/UL (ref 3.9–12.7)
WBC # BLD AUTO: 2.02 K/UL (ref 3.9–12.7)
WBC # BLD AUTO: 2.17 K/UL (ref 3.9–12.7)
WBC # BLD AUTO: 2.22 K/UL (ref 3.9–12.7)
WBC # BLD AUTO: 2.31 K/UL (ref 3.9–12.7)
WBC # BLD AUTO: 2.44 K/UL (ref 3.9–12.7)
WBC # BLD AUTO: 2.47 K/UL (ref 3.9–12.7)
WBC # BLD AUTO: 2.47 K/UL (ref 3.9–12.7)
WBC # BLD AUTO: 2.59 K/UL (ref 3.9–12.7)
WBC # BLD AUTO: 2.66 K/UL (ref 3.9–12.7)
WBC # BLD AUTO: 2.76 K/UL (ref 3.9–12.7)
WBC # BLD AUTO: 2.78 K/UL (ref 3.9–12.7)
WBC # BLD AUTO: 2.81 K/UL (ref 3.9–12.7)
WBC # BLD AUTO: 2.83 K/UL (ref 3.9–12.7)
WBC # BLD AUTO: 3.01 K/UL (ref 3.9–12.7)
WBC # BLD AUTO: 3.04 K/UL (ref 3.9–12.7)
WBC # BLD AUTO: 3.18 K/UL (ref 3.9–12.7)
WBC # BLD AUTO: 3.2 K/UL (ref 3.9–12.7)
WBC # BLD AUTO: 3.27 K/UL (ref 3.9–12.7)
WBC # BLD AUTO: 3.35 K/UL (ref 3.9–12.7)
WBC # BLD AUTO: 3.35 K/UL (ref 3.9–12.7)
WBC # BLD AUTO: 3.44 K/UL (ref 3.9–12.7)
WBC # BLD AUTO: 3.64 K/UL (ref 3.9–12.7)
WBC # BLD AUTO: 3.68 K/UL (ref 3.9–12.7)
WBC # BLD AUTO: 3.72 K/UL (ref 3.9–12.7)
WBC # BLD AUTO: 3.93 K/UL (ref 3.9–12.7)
WBC # BLD AUTO: 4.3 K/UL (ref 3.9–12.7)

## 2021-01-01 PROCEDURE — 86922 COMPATIBILITY TEST ANTIGLOB: CPT | Performed by: NURSE PRACTITIONER

## 2021-01-01 PROCEDURE — 1157F PR ADVANCE CARE PLAN OR EQUIV PRESENT IN MEDICAL RECORD: ICD-10-PCS | Mod: S$GLB,,, | Performed by: NURSE PRACTITIONER

## 2021-01-01 PROCEDURE — 3008F BODY MASS INDEX DOCD: CPT | Mod: CPTII,S$GLB,, | Performed by: NURSE PRACTITIONER

## 2021-01-01 PROCEDURE — 25000003 PHARM REV CODE 250: Performed by: INTERNAL MEDICINE

## 2021-01-01 PROCEDURE — 1159F MED LIST DOCD IN RCRD: CPT | Mod: CPTII,S$GLB,, | Performed by: PHYSICIAN ASSISTANT

## 2021-01-01 PROCEDURE — 96401 CHEMO ANTI-NEOPL SQ/IM: CPT

## 2021-01-01 PROCEDURE — 85610 PROTHROMBIN TIME: CPT | Performed by: PHYSICIAN ASSISTANT

## 2021-01-01 PROCEDURE — 86900 BLOOD TYPING SEROLOGIC ABO: CPT | Performed by: INTERNAL MEDICINE

## 2021-01-01 PROCEDURE — 99215 OFFICE O/P EST HI 40 MIN: CPT | Mod: S$GLB,,, | Performed by: INTERNAL MEDICINE

## 2021-01-01 PROCEDURE — 63600175 PHARM REV CODE 636 W HCPCS: Performed by: INTERNAL MEDICINE

## 2021-01-01 PROCEDURE — 3074F PR MOST RECENT SYSTOLIC BLOOD PRESSURE < 130 MM HG: ICD-10-PCS | Mod: CPTII,S$GLB,, | Performed by: PHYSICIAN ASSISTANT

## 2021-01-01 PROCEDURE — 76380 CAT SCAN FOLLOW-UP STUDY: CPT | Mod: TC,RT | Performed by: RADIOLOGY

## 2021-01-01 PROCEDURE — P9040 RBC LEUKOREDUCED IRRADIATED: HCPCS | Performed by: NURSE PRACTITIONER

## 2021-01-01 PROCEDURE — 99499 UNLISTED E&M SERVICE: CPT | Mod: S$GLB,,, | Performed by: NURSE PRACTITIONER

## 2021-01-01 PROCEDURE — 85007 BL SMEAR W/DIFF WBC COUNT: CPT | Performed by: INTERNAL MEDICINE

## 2021-01-01 PROCEDURE — 99999 PR PBB SHADOW E&M-EST. PATIENT-LVL V: ICD-10-PCS | Mod: PBBFAC,,, | Performed by: INTERNAL MEDICINE

## 2021-01-01 PROCEDURE — 1159F PR MEDICATION LIST DOCUMENTED IN MEDICAL RECORD: ICD-10-PCS | Mod: CPTII,S$GLB,, | Performed by: PHYSICIAN ASSISTANT

## 2021-01-01 PROCEDURE — 3075F PR MOST RECENT SYSTOLIC BLOOD PRESS GE 130-139MM HG: ICD-10-PCS | Mod: CPTII,S$GLB,, | Performed by: INTERNAL MEDICINE

## 2021-01-01 PROCEDURE — 3044F HG A1C LEVEL LT 7.0%: CPT | Mod: CPTII,S$GLB,, | Performed by: INTERNAL MEDICINE

## 2021-01-01 PROCEDURE — 25000003 PHARM REV CODE 250: Performed by: NURSE PRACTITIONER

## 2021-01-01 PROCEDURE — 97802 MEDICAL NUTRITION INDIV IN: CPT | Mod: S$GLB,,, | Performed by: DIETITIAN, REGISTERED

## 2021-01-01 PROCEDURE — 36415 COLL VENOUS BLD VENIPUNCTURE: CPT | Performed by: INTERNAL MEDICINE

## 2021-01-01 PROCEDURE — 3074F SYST BP LT 130 MM HG: CPT | Mod: CPTII,S$GLB,, | Performed by: PHYSICIAN ASSISTANT

## 2021-01-01 PROCEDURE — 80053 COMPREHEN METABOLIC PANEL: CPT | Performed by: INTERNAL MEDICINE

## 2021-01-01 PROCEDURE — 36430 TRANSFUSION BLD/BLD COMPNT: CPT

## 2021-01-01 PROCEDURE — 99499 RISK ADDL DX/OHS AUDIT: ICD-10-PCS | Mod: 95,,, | Performed by: INTERNAL MEDICINE

## 2021-01-01 PROCEDURE — 99214 PR OFFICE/OUTPT VISIT, EST, LEVL IV, 30-39 MIN: ICD-10-PCS | Mod: 95,,, | Performed by: INTERNAL MEDICINE

## 2021-01-01 PROCEDURE — 3078F PR MOST RECENT DIASTOLIC BLOOD PRESSURE < 80 MM HG: ICD-10-PCS | Mod: CPTII,S$GLB,, | Performed by: PHYSICIAN ASSISTANT

## 2021-01-01 PROCEDURE — 3077F SYST BP >= 140 MM HG: CPT | Mod: CPTII,S$GLB,, | Performed by: NURSE PRACTITIONER

## 2021-01-01 PROCEDURE — 3044F HG A1C LEVEL LT 7.0%: CPT | Mod: CPTII,S$GLB,, | Performed by: PHYSICIAN ASSISTANT

## 2021-01-01 PROCEDURE — 97802 PR MED NUTR THER, 1ST, INDIV, EA 15 MIN: ICD-10-PCS | Mod: S$GLB,,, | Performed by: DIETITIAN, REGISTERED

## 2021-01-01 PROCEDURE — 99999 PR PBB SHADOW E&M-EST. PATIENT-LVL IV: ICD-10-PCS | Mod: PBBFAC,,, | Performed by: INTERNAL MEDICINE

## 2021-01-01 PROCEDURE — 4010F PR ACE/ARB THEARPY RXD/TAKEN: ICD-10-PCS | Mod: CPTII,S$GLB,, | Performed by: INTERNAL MEDICINE

## 2021-01-01 PROCEDURE — 85025 COMPLETE CBC W/AUTO DIFF WBC: CPT | Performed by: INTERNAL MEDICINE

## 2021-01-01 PROCEDURE — 1159F MED LIST DOCD IN RCRD: CPT | Mod: CPTII,95,, | Performed by: INTERNAL MEDICINE

## 2021-01-01 PROCEDURE — P9040 RBC LEUKOREDUCED IRRADIATED: HCPCS | Performed by: INTERNAL MEDICINE

## 2021-01-01 PROCEDURE — 3078F PR MOST RECENT DIASTOLIC BLOOD PRESSURE < 80 MM HG: ICD-10-PCS | Mod: CPTII,S$GLB,, | Performed by: NURSE PRACTITIONER

## 2021-01-01 PROCEDURE — 4010F ACE/ARB THERAPY RXD/TAKEN: CPT | Mod: CPTII,S$GLB,, | Performed by: INTERNAL MEDICINE

## 2021-01-01 PROCEDURE — 63600175 PHARM REV CODE 636 W HCPCS: Mod: JW,JG | Performed by: INTERNAL MEDICINE

## 2021-01-01 PROCEDURE — 99215 OFFICE O/P EST HI 40 MIN: CPT | Mod: S$GLB,,, | Performed by: NURSE PRACTITIONER

## 2021-01-01 PROCEDURE — 1159F MED LIST DOCD IN RCRD: CPT | Mod: CPTII,S$GLB,, | Performed by: INTERNAL MEDICINE

## 2021-01-01 PROCEDURE — 1159F PR MEDICATION LIST DOCUMENTED IN MEDICAL RECORD: ICD-10-PCS | Mod: CPTII,S$GLB,, | Performed by: INTERNAL MEDICINE

## 2021-01-01 PROCEDURE — 3077F PR MOST RECENT SYSTOLIC BLOOD PRESSURE >= 140 MM HG: ICD-10-PCS | Mod: CPTII,S$GLB,, | Performed by: NURSE PRACTITIONER

## 2021-01-01 PROCEDURE — 3288F PR FALLS RISK ASSESSMENT DOCUMENTED: ICD-10-PCS | Mod: CPTII,S$GLB,, | Performed by: NURSE PRACTITIONER

## 2021-01-01 PROCEDURE — 99499 UNLISTED E&M SERVICE: CPT | Mod: S$GLB,,, | Performed by: PHYSICIAN ASSISTANT

## 2021-01-01 PROCEDURE — 1157F ADVNC CARE PLAN IN RCRD: CPT | Mod: S$GLB,,, | Performed by: NURSE PRACTITIONER

## 2021-01-01 PROCEDURE — 1157F PR ADVANCE CARE PLAN OR EQUIV PRESENT IN MEDICAL RECORD: ICD-10-PCS | Mod: CPTII,S$GLB,, | Performed by: INTERNAL MEDICINE

## 2021-01-01 PROCEDURE — 1157F PR ADVANCE CARE PLAN OR EQUIV PRESENT IN MEDICAL RECORD: ICD-10-PCS | Mod: CPTII,95,, | Performed by: INTERNAL MEDICINE

## 2021-01-01 PROCEDURE — 99499 UNLISTED E&M SERVICE: CPT | Mod: S$GLB,,, | Performed by: INTERNAL MEDICINE

## 2021-01-01 PROCEDURE — 99214 OFFICE O/P EST MOD 30 MIN: CPT | Mod: 95,,, | Performed by: INTERNAL MEDICINE

## 2021-01-01 PROCEDURE — 1159F MED LIST DOCD IN RCRD: CPT | Mod: CPTII,S$GLB,, | Performed by: NURSE PRACTITIONER

## 2021-01-01 PROCEDURE — 4010F ACE/ARB THERAPY RXD/TAKEN: CPT | Mod: CPTII,S$GLB,, | Performed by: PHYSICIAN ASSISTANT

## 2021-01-01 PROCEDURE — 1160F PR REVIEW ALL MEDS BY PRESCRIBER/CLIN PHARMACIST DOCUMENTED: ICD-10-PCS | Mod: CPTII,95,, | Performed by: INTERNAL MEDICINE

## 2021-01-01 PROCEDURE — 3288F PR FALLS RISK ASSESSMENT DOCUMENTED: ICD-10-PCS | Mod: CPTII,S$GLB,, | Performed by: INTERNAL MEDICINE

## 2021-01-01 PROCEDURE — 63600175 PHARM REV CODE 636 W HCPCS: Mod: JG | Performed by: INTERNAL MEDICINE

## 2021-01-01 PROCEDURE — 76380 IR CT LIMITED: ICD-10-PCS | Mod: 26,RT,, | Performed by: RADIOLOGY

## 2021-01-01 PROCEDURE — 86922 COMPATIBILITY TEST ANTIGLOB: CPT | Performed by: INTERNAL MEDICINE

## 2021-01-01 PROCEDURE — 3008F BODY MASS INDEX DOCD: CPT | Mod: CPTII,S$GLB,, | Performed by: INTERNAL MEDICINE

## 2021-01-01 PROCEDURE — A4216 STERILE WATER/SALINE, 10 ML: HCPCS | Performed by: INTERNAL MEDICINE

## 2021-01-01 PROCEDURE — 99999 PR PBB SHADOW E&M-EST. PATIENT-LVL V: CPT | Mod: PBBFAC,,, | Performed by: INTERNAL MEDICINE

## 2021-01-01 PROCEDURE — 99499 RISK ADDL DX/OHS AUDIT: ICD-10-PCS | Mod: S$GLB,,, | Performed by: PHYSICIAN ASSISTANT

## 2021-01-01 PROCEDURE — 3008F PR BODY MASS INDEX (BMI) DOCUMENTED: ICD-10-PCS | Mod: CPTII,S$GLB,, | Performed by: NURSE PRACTITIONER

## 2021-01-01 PROCEDURE — 4010F PR ACE/ARB THEARPY RXD/TAKEN: ICD-10-PCS | Mod: CPTII,S$GLB,, | Performed by: NURSE PRACTITIONER

## 2021-01-01 PROCEDURE — 3008F PR BODY MASS INDEX (BMI) DOCUMENTED: ICD-10-PCS | Mod: CPTII,S$GLB,, | Performed by: INTERNAL MEDICINE

## 2021-01-01 PROCEDURE — 1126F PR PAIN SEVERITY QUANTIFIED, NO PAIN PRESENT: ICD-10-PCS | Mod: CPTII,S$GLB,, | Performed by: NURSE PRACTITIONER

## 2021-01-01 PROCEDURE — 1126F PR PAIN SEVERITY QUANTIFIED, NO PAIN PRESENT: ICD-10-PCS | Mod: S$GLB,,, | Performed by: NURSE PRACTITIONER

## 2021-01-01 PROCEDURE — 3078F PR MOST RECENT DIASTOLIC BLOOD PRESSURE < 80 MM HG: ICD-10-PCS | Mod: CPTII,S$GLB,, | Performed by: INTERNAL MEDICINE

## 2021-01-01 PROCEDURE — 99999 PR PBB SHADOW E&M-EST. PATIENT-LVL III: ICD-10-PCS | Mod: PBBFAC,,, | Performed by: NURSE PRACTITIONER

## 2021-01-01 PROCEDURE — G0439 PPPS, SUBSEQ VISIT: HCPCS | Mod: S$GLB,,, | Performed by: NURSE PRACTITIONER

## 2021-01-01 PROCEDURE — 99499 RISK ADDL DX/OHS AUDIT: ICD-10-PCS | Mod: S$GLB,,, | Performed by: NURSE PRACTITIONER

## 2021-01-01 PROCEDURE — 1157F ADVNC CARE PLAN IN RCRD: CPT | Mod: CPTII,S$GLB,, | Performed by: PHYSICIAN ASSISTANT

## 2021-01-01 PROCEDURE — 1101F PR PT FALLS ASSESS DOC 0-1 FALLS W/OUT INJ PAST YR: ICD-10-PCS | Mod: CPTII,S$GLB,, | Performed by: NURSE PRACTITIONER

## 2021-01-01 PROCEDURE — A9698 NON-RAD CONTRAST MATERIALNOC: HCPCS | Performed by: INTERNAL MEDICINE

## 2021-01-01 PROCEDURE — 3288F FALL RISK ASSESSMENT DOCD: CPT | Mod: CPTII,S$GLB,, | Performed by: NURSE PRACTITIONER

## 2021-01-01 PROCEDURE — 3078F DIAST BP <80 MM HG: CPT | Mod: CPTII,S$GLB,, | Performed by: NURSE PRACTITIONER

## 2021-01-01 PROCEDURE — 1159F PR MEDICATION LIST DOCUMENTED IN MEDICAL RECORD: ICD-10-PCS | Mod: S$GLB,,, | Performed by: INTERNAL MEDICINE

## 2021-01-01 PROCEDURE — 3044F PR MOST RECENT HEMOGLOBIN A1C LEVEL <7.0%: ICD-10-PCS | Mod: CPTII,S$GLB,, | Performed by: INTERNAL MEDICINE

## 2021-01-01 PROCEDURE — 1160F PR REVIEW ALL MEDS BY PRESCRIBER/CLIN PHARMACIST DOCUMENTED: ICD-10-PCS | Mod: CPTII,S$GLB,, | Performed by: INTERNAL MEDICINE

## 2021-01-01 PROCEDURE — 3044F HG A1C LEVEL LT 7.0%: CPT | Mod: CPTII,95,, | Performed by: INTERNAL MEDICINE

## 2021-01-01 PROCEDURE — 1159F PR MEDICATION LIST DOCUMENTED IN MEDICAL RECORD: ICD-10-PCS | Mod: CPTII,S$GLB,, | Performed by: NURSE PRACTITIONER

## 2021-01-01 PROCEDURE — 1101F PR PT FALLS ASSESS DOC 0-1 FALLS W/OUT INJ PAST YR: ICD-10-PCS | Mod: CPTII,S$GLB,, | Performed by: INTERNAL MEDICINE

## 2021-01-01 PROCEDURE — 3044F PR MOST RECENT HEMOGLOBIN A1C LEVEL <7.0%: ICD-10-PCS | Mod: CPTII,S$GLB,, | Performed by: NURSE PRACTITIONER

## 2021-01-01 PROCEDURE — 3079F DIAST BP 80-89 MM HG: CPT | Mod: CPTII,S$GLB,, | Performed by: NURSE PRACTITIONER

## 2021-01-01 PROCEDURE — 86902 BLOOD TYPE ANTIGEN DONOR EA: CPT | Mod: 59 | Performed by: INTERNAL MEDICINE

## 2021-01-01 PROCEDURE — P9038 RBC IRRADIATED: HCPCS | Performed by: INTERNAL MEDICINE

## 2021-01-01 PROCEDURE — 99215 PR OFFICE/OUTPT VISIT, EST, LEVL V, 40-54 MIN: ICD-10-PCS | Mod: S$GLB,,, | Performed by: INTERNAL MEDICINE

## 2021-01-01 PROCEDURE — 78815 PET IMAGE W/CT SKULL-THIGH: CPT | Mod: 26,PS,, | Performed by: STUDENT IN AN ORGANIZED HEALTH CARE EDUCATION/TRAINING PROGRAM

## 2021-01-01 PROCEDURE — 1157F ADVNC CARE PLAN IN RCRD: CPT | Mod: CPTII,S$GLB,, | Performed by: NURSE PRACTITIONER

## 2021-01-01 PROCEDURE — 3044F HG A1C LEVEL LT 7.0%: CPT | Mod: CPTII,S$GLB,, | Performed by: NURSE PRACTITIONER

## 2021-01-01 PROCEDURE — 36591 DRAW BLOOD OFF VENOUS DEVICE: CPT

## 2021-01-01 PROCEDURE — 4010F ACE/ARB THERAPY RXD/TAKEN: CPT | Mod: CPTII,S$GLB,, | Performed by: NURSE PRACTITIONER

## 2021-01-01 PROCEDURE — 1101F PT FALLS ASSESS-DOCD LE1/YR: CPT | Mod: CPTII,S$GLB,, | Performed by: NURSE PRACTITIONER

## 2021-01-01 PROCEDURE — 80053 COMPREHEN METABOLIC PANEL: CPT | Mod: 91 | Performed by: INTERNAL MEDICINE

## 2021-01-01 PROCEDURE — 96409 CHEMO IV PUSH SNGL DRUG: CPT

## 2021-01-01 PROCEDURE — 99499 RISK ADDL DX/OHS AUDIT: ICD-10-PCS | Mod: S$GLB,,, | Performed by: INTERNAL MEDICINE

## 2021-01-01 PROCEDURE — 99999 PR PBB SHADOW E&M-EST. PATIENT-LVL III: CPT | Mod: PBBFAC,,, | Performed by: NURSE PRACTITIONER

## 2021-01-01 PROCEDURE — 3044F PR MOST RECENT HEMOGLOBIN A1C LEVEL <7.0%: ICD-10-PCS | Mod: CPTII,S$GLB,, | Performed by: PHYSICIAN ASSISTANT

## 2021-01-01 PROCEDURE — 99215 PR OFFICE/OUTPT VISIT, EST, LEVL V, 40-54 MIN: ICD-10-PCS | Mod: S$GLB,,, | Performed by: NURSE PRACTITIONER

## 2021-01-01 PROCEDURE — 1126F AMNT PAIN NOTED NONE PRSNT: CPT | Mod: CPTII,S$GLB,, | Performed by: NURSE PRACTITIONER

## 2021-01-01 PROCEDURE — 3008F PR BODY MASS INDEX (BMI) DOCUMENTED: ICD-10-PCS | Mod: CPTII,S$GLB,, | Performed by: PHYSICIAN ASSISTANT

## 2021-01-01 PROCEDURE — G0439 PR MEDICARE ANNUAL WELLNESS SUBSEQUENT VISIT: ICD-10-PCS | Mod: S$GLB,,, | Performed by: NURSE PRACTITIONER

## 2021-01-01 PROCEDURE — 3288F FALL RISK ASSESSMENT DOCD: CPT | Mod: CPTII,S$GLB,, | Performed by: INTERNAL MEDICINE

## 2021-01-01 PROCEDURE — 1101F PT FALLS ASSESS-DOCD LE1/YR: CPT | Mod: CPTII,S$GLB,, | Performed by: INTERNAL MEDICINE

## 2021-01-01 PROCEDURE — 1159F MED LIST DOCD IN RCRD: CPT | Mod: S$GLB,,, | Performed by: INTERNAL MEDICINE

## 2021-01-01 PROCEDURE — 99214 OFFICE O/P EST MOD 30 MIN: CPT | Mod: S$GLB,CS,, | Performed by: PHYSICIAN ASSISTANT

## 2021-01-01 PROCEDURE — U0003 INFECTIOUS AGENT DETECTION BY NUCLEIC ACID (DNA OR RNA); SEVERE ACUTE RESPIRATORY SYNDROME CORONAVIRUS 2 (SARS-COV-2) (CORONAVIRUS DISEASE [COVID-19]), AMPLIFIED PROBE TECHNIQUE, MAKING USE OF HIGH THROUGHPUT TECHNOLOGIES AS DESCRIBED BY CMS-2020-01-R: HCPCS | Performed by: PHYSICIAN ASSISTANT

## 2021-01-01 PROCEDURE — 1157F ADVNC CARE PLAN IN RCRD: CPT | Mod: CPTII,95,, | Performed by: INTERNAL MEDICINE

## 2021-01-01 PROCEDURE — 1157F PR ADVANCE CARE PLAN OR EQUIV PRESENT IN MEDICAL RECORD: ICD-10-PCS | Mod: CPTII,S$GLB,, | Performed by: NURSE PRACTITIONER

## 2021-01-01 PROCEDURE — 1126F AMNT PAIN NOTED NONE PRSNT: CPT | Mod: CPTII,S$GLB,, | Performed by: INTERNAL MEDICINE

## 2021-01-01 PROCEDURE — 1160F PR REVIEW ALL MEDS BY PRESCRIBER/CLIN PHARMACIST DOCUMENTED: ICD-10-PCS | Mod: CPTII,S$GLB,, | Performed by: NURSE PRACTITIONER

## 2021-01-01 PROCEDURE — 99999 PR PBB SHADOW E&M-EST. PATIENT-LVL IV: CPT | Mod: PBBFAC,,, | Performed by: PHYSICIAN ASSISTANT

## 2021-01-01 PROCEDURE — 3078F DIAST BP <80 MM HG: CPT | Mod: CPTII,S$GLB,, | Performed by: INTERNAL MEDICINE

## 2021-01-01 PROCEDURE — 3044F PR MOST RECENT HEMOGLOBIN A1C LEVEL <7.0%: ICD-10-PCS | Mod: CPTII,95,, | Performed by: INTERNAL MEDICINE

## 2021-01-01 PROCEDURE — 1126F PR PAIN SEVERITY QUANTIFIED, NO PAIN PRESENT: ICD-10-PCS | Mod: CPTII,S$GLB,, | Performed by: INTERNAL MEDICINE

## 2021-01-01 PROCEDURE — 99214 PR OFFICE/OUTPT VISIT, EST, LEVL IV, 30-39 MIN: ICD-10-PCS | Mod: S$GLB,CS,, | Performed by: PHYSICIAN ASSISTANT

## 2021-01-01 PROCEDURE — 3075F SYST BP GE 130 - 139MM HG: CPT | Mod: CPTII,S$GLB,, | Performed by: NURSE PRACTITIONER

## 2021-01-01 PROCEDURE — 99214 PR OFFICE/OUTPT VISIT, EST, LEVL IV, 30-39 MIN: ICD-10-PCS | Mod: S$GLB,,, | Performed by: NURSE PRACTITIONER

## 2021-01-01 PROCEDURE — 1126F AMNT PAIN NOTED NONE PRSNT: CPT | Mod: S$GLB,,, | Performed by: NURSE PRACTITIONER

## 2021-01-01 PROCEDURE — 1157F PR ADVANCE CARE PLAN OR EQUIV PRESENT IN MEDICAL RECORD: ICD-10-PCS | Mod: S$GLB,,, | Performed by: INTERNAL MEDICINE

## 2021-01-01 PROCEDURE — 27201040 HC RC 50 FILTER: Performed by: INTERNAL MEDICINE

## 2021-01-01 PROCEDURE — 1125F PR PAIN SEVERITY QUANTIFIED, PAIN PRESENT: ICD-10-PCS | Mod: CPTII,S$GLB,, | Performed by: INTERNAL MEDICINE

## 2021-01-01 PROCEDURE — 1123F ACP DISCUSS/DSCN MKR DOCD: CPT | Mod: CPTII,S$GLB,, | Performed by: INTERNAL MEDICINE

## 2021-01-01 PROCEDURE — 85025 COMPLETE CBC W/AUTO DIFF WBC: CPT | Performed by: PHYSICIAN ASSISTANT

## 2021-01-01 PROCEDURE — 85027 COMPLETE CBC AUTOMATED: CPT | Performed by: INTERNAL MEDICINE

## 2021-01-01 PROCEDURE — 1160F RVW MEDS BY RX/DR IN RCRD: CPT | Mod: CPTII,S$GLB,, | Performed by: INTERNAL MEDICINE

## 2021-01-01 PROCEDURE — 99214 OFFICE O/P EST MOD 30 MIN: CPT | Mod: S$GLB,,, | Performed by: NURSE PRACTITIONER

## 2021-01-01 PROCEDURE — 3078F DIAST BP <80 MM HG: CPT | Mod: CPTII,S$GLB,, | Performed by: PHYSICIAN ASSISTANT

## 2021-01-01 PROCEDURE — 1160F PR REVIEW ALL MEDS BY PRESCRIBER/CLIN PHARMACIST DOCUMENTED: ICD-10-PCS | Mod: CPTII,S$GLB,, | Performed by: PHYSICIAN ASSISTANT

## 2021-01-01 PROCEDURE — 4010F PR ACE/ARB THEARPY RXD/TAKEN: ICD-10-PCS | Mod: CPTII,S$GLB,, | Performed by: PHYSICIAN ASSISTANT

## 2021-01-01 PROCEDURE — 3077F SYST BP >= 140 MM HG: CPT | Mod: CPTII,S$GLB,, | Performed by: INTERNAL MEDICINE

## 2021-01-01 PROCEDURE — 3079F PR MOST RECENT DIASTOLIC BLOOD PRESSURE 80-89 MM HG: ICD-10-PCS | Mod: CPTII,S$GLB,, | Performed by: NURSE PRACTITIONER

## 2021-01-01 PROCEDURE — 1159F PR MEDICATION LIST DOCUMENTED IN MEDICAL RECORD: ICD-10-PCS | Mod: CPTII,95,, | Performed by: INTERNAL MEDICINE

## 2021-01-01 PROCEDURE — 1157F ADVNC CARE PLAN IN RCRD: CPT | Mod: CPTII,S$GLB,, | Performed by: INTERNAL MEDICINE

## 2021-01-01 PROCEDURE — 99499 UNLISTED E&M SERVICE: CPT | Mod: 95,,, | Performed by: INTERNAL MEDICINE

## 2021-01-01 PROCEDURE — U0005 INFEC AGEN DETEC AMPLI PROBE: HCPCS | Performed by: PHYSICIAN ASSISTANT

## 2021-01-01 PROCEDURE — 96417 CHEMO IV INFUS EACH ADDL SEQ: CPT

## 2021-01-01 PROCEDURE — 25500020 PHARM REV CODE 255: Performed by: INTERNAL MEDICINE

## 2021-01-01 PROCEDURE — 3077F PR MOST RECENT SYSTOLIC BLOOD PRESSURE >= 140 MM HG: ICD-10-PCS | Mod: CPTII,S$GLB,, | Performed by: INTERNAL MEDICINE

## 2021-01-01 PROCEDURE — 1160F RVW MEDS BY RX/DR IN RCRD: CPT | Mod: CPTII,95,, | Performed by: INTERNAL MEDICINE

## 2021-01-01 PROCEDURE — 1125F AMNT PAIN NOTED PAIN PRSNT: CPT | Mod: CPTII,S$GLB,, | Performed by: INTERNAL MEDICINE

## 2021-01-01 PROCEDURE — 1160F RVW MEDS BY RX/DR IN RCRD: CPT | Mod: CPTII,S$GLB,, | Performed by: NURSE PRACTITIONER

## 2021-01-01 PROCEDURE — 78815 NM PET CT ROUTINE: ICD-10-PCS | Mod: 26,PS,, | Performed by: STUDENT IN AN ORGANIZED HEALTH CARE EDUCATION/TRAINING PROGRAM

## 2021-01-01 PROCEDURE — 1160F RVW MEDS BY RX/DR IN RCRD: CPT | Mod: CPTII,S$GLB,, | Performed by: PHYSICIAN ASSISTANT

## 2021-01-01 PROCEDURE — 1157F PR ADVANCE CARE PLAN OR EQUIV PRESENT IN MEDICAL RECORD: ICD-10-PCS | Mod: CPTII,S$GLB,, | Performed by: PHYSICIAN ASSISTANT

## 2021-01-01 PROCEDURE — 99999 PR PBB SHADOW E&M-EST. PATIENT-LVL IV: CPT | Mod: PBBFAC,,, | Performed by: INTERNAL MEDICINE

## 2021-01-01 PROCEDURE — 1125F PR PAIN SEVERITY QUANTIFIED, PAIN PRESENT: ICD-10-PCS | Mod: S$GLB,,, | Performed by: INTERNAL MEDICINE

## 2021-01-01 PROCEDURE — 99999 PR PBB SHADOW E&M-EST. PATIENT-LVL IV: ICD-10-PCS | Mod: PBBFAC,,, | Performed by: PHYSICIAN ASSISTANT

## 2021-01-01 PROCEDURE — 1125F AMNT PAIN NOTED PAIN PRSNT: CPT | Mod: S$GLB,,, | Performed by: INTERNAL MEDICINE

## 2021-01-01 PROCEDURE — 78815 PET IMAGE W/CT SKULL-THIGH: CPT | Mod: TC

## 2021-01-01 PROCEDURE — 3075F SYST BP GE 130 - 139MM HG: CPT | Mod: CPTII,S$GLB,, | Performed by: INTERNAL MEDICINE

## 2021-01-01 PROCEDURE — 3008F BODY MASS INDEX DOCD: CPT | Mod: CPTII,S$GLB,, | Performed by: PHYSICIAN ASSISTANT

## 2021-01-01 PROCEDURE — 1157F ADVNC CARE PLAN IN RCRD: CPT | Mod: S$GLB,,, | Performed by: INTERNAL MEDICINE

## 2021-01-01 PROCEDURE — 1123F PR ADV CARE PLAN DISCUSSED, PLAN OR SURROGATE DOCUMENTED: ICD-10-PCS | Mod: CPTII,S$GLB,, | Performed by: INTERNAL MEDICINE

## 2021-01-01 PROCEDURE — 3075F PR MOST RECENT SYSTOLIC BLOOD PRESS GE 130-139MM HG: ICD-10-PCS | Mod: CPTII,S$GLB,, | Performed by: NURSE PRACTITIONER

## 2021-01-01 RX ORDER — HEPARIN 100 UNIT/ML
500 SYRINGE INTRAVENOUS
Status: COMPLETED | OUTPATIENT
Start: 2021-01-01 | End: 2021-01-01

## 2021-01-01 RX ORDER — ACETAMINOPHEN 325 MG/1
650 TABLET ORAL
Status: CANCELLED | OUTPATIENT
Start: 2021-01-01

## 2021-01-01 RX ORDER — AZACITIDINE 100 MG/1
75 INJECTION, POWDER, LYOPHILIZED, FOR SOLUTION INTRAVENOUS; SUBCUTANEOUS
Status: CANCELLED | OUTPATIENT
Start: 2021-01-01

## 2021-01-01 RX ORDER — PANTOPRAZOLE SODIUM 40 MG/1
40 TABLET, DELAYED RELEASE ORAL DAILY
Qty: 30 TABLET | Refills: 3 | Status: SHIPPED | OUTPATIENT
Start: 2021-01-01 | End: 2022-01-01 | Stop reason: SDUPTHER

## 2021-01-01 RX ORDER — ACETAMINOPHEN 325 MG/1
650 TABLET ORAL
Status: COMPLETED | OUTPATIENT
Start: 2021-01-01 | End: 2021-01-01

## 2021-01-01 RX ORDER — HYDROCODONE BITARTRATE AND ACETAMINOPHEN 500; 5 MG/1; MG/1
TABLET ORAL ONCE
Status: COMPLETED | OUTPATIENT
Start: 2021-01-01 | End: 2021-01-01

## 2021-01-01 RX ORDER — ONDANSETRON 8 MG/1
8 TABLET, ORALLY DISINTEGRATING ORAL ONCE
Status: CANCELLED | OUTPATIENT
Start: 2021-01-01 | End: 2021-01-01

## 2021-01-01 RX ORDER — ONDANSETRON 4 MG/1
8 TABLET, ORALLY DISINTEGRATING ORAL ONCE
Status: DISCONTINUED | OUTPATIENT
Start: 2021-01-01 | End: 2021-01-01 | Stop reason: HOSPADM

## 2021-01-01 RX ORDER — AZACITIDINE 100 MG/1
75 INJECTION, POWDER, LYOPHILIZED, FOR SOLUTION INTRAVENOUS; SUBCUTANEOUS
Status: COMPLETED | OUTPATIENT
Start: 2021-01-01 | End: 2021-01-01

## 2021-01-01 RX ORDER — ONDANSETRON 4 MG/1
8 TABLET, ORALLY DISINTEGRATING ORAL ONCE
Status: COMPLETED | OUTPATIENT
Start: 2021-01-01 | End: 2021-01-01

## 2021-01-01 RX ORDER — DIPHENHYDRAMINE HCL 25 MG
25 CAPSULE ORAL
Status: COMPLETED | OUTPATIENT
Start: 2021-01-01 | End: 2021-01-01

## 2021-01-01 RX ORDER — DIPHENHYDRAMINE HCL 25 MG
25 CAPSULE ORAL
Status: CANCELLED | OUTPATIENT
Start: 2021-01-01

## 2021-01-01 RX ORDER — DIPHENHYDRAMINE HCL 25 MG
25 CAPSULE ORAL
Status: DISCONTINUED | OUTPATIENT
Start: 2021-01-01 | End: 2021-01-01 | Stop reason: HOSPADM

## 2021-01-01 RX ORDER — HEPARIN 100 UNIT/ML
500 SYRINGE INTRAVENOUS
Status: DISCONTINUED | OUTPATIENT
Start: 2021-01-01 | End: 2021-01-01 | Stop reason: HOSPADM

## 2021-01-01 RX ORDER — HYDROCODONE BITARTRATE AND ACETAMINOPHEN 500; 5 MG/1; MG/1
TABLET ORAL ONCE
Status: CANCELLED | OUTPATIENT
Start: 2021-01-01 | End: 2021-01-01

## 2021-01-01 RX ORDER — HYDROCODONE BITARTRATE AND ACETAMINOPHEN 500; 5 MG/1; MG/1
TABLET ORAL ONCE
Status: DISCONTINUED | OUTPATIENT
Start: 2021-01-01 | End: 2021-01-01 | Stop reason: HOSPADM

## 2021-01-01 RX ORDER — SODIUM CHLORIDE 0.9 % (FLUSH) 0.9 %
10 SYRINGE (ML) INJECTION
Status: COMPLETED | OUTPATIENT
Start: 2021-01-01 | End: 2021-01-01

## 2021-01-01 RX ORDER — LISINOPRIL AND HYDROCHLOROTHIAZIDE 12.5; 2 MG/1; MG/1
2 TABLET ORAL DAILY
Qty: 180 TABLET | Refills: 3 | Status: ON HOLD | OUTPATIENT
Start: 2021-01-01 | End: 2022-01-01 | Stop reason: HOSPADM

## 2021-01-01 RX ORDER — ACETAMINOPHEN 325 MG/1
650 TABLET ORAL
Status: DISCONTINUED | OUTPATIENT
Start: 2021-01-01 | End: 2021-01-01 | Stop reason: HOSPADM

## 2021-01-01 RX ORDER — HEPARIN 100 UNIT/ML
500 SYRINGE INTRAVENOUS
Status: CANCELLED | OUTPATIENT
Start: 2021-01-01

## 2021-01-01 RX ORDER — SODIUM CHLORIDE 0.9 % (FLUSH) 0.9 %
10 SYRINGE (ML) INJECTION
Status: CANCELLED | OUTPATIENT
Start: 2021-01-01

## 2021-01-01 RX ORDER — FUROSEMIDE 10 MG/ML
20 INJECTION INTRAMUSCULAR; INTRAVENOUS
Status: CANCELLED | OUTPATIENT
Start: 2021-01-01

## 2021-01-01 RX ORDER — ONDANSETRON 8 MG/1
TABLET, ORALLY DISINTEGRATING ORAL
Qty: 30 TABLET | Refills: 6 | Status: SHIPPED | OUTPATIENT
Start: 2021-01-01 | End: 2022-01-01

## 2021-01-01 RX ORDER — DIPHENHYDRAMINE HCL 25 MG
25 CAPSULE ORAL ONCE
Status: COMPLETED | OUTPATIENT
Start: 2021-01-01 | End: 2021-01-01

## 2021-01-01 RX ORDER — SODIUM CHLORIDE 9 MG/ML
INJECTION, SOLUTION INTRAVENOUS CONTINUOUS
Status: DISCONTINUED | OUTPATIENT
Start: 2021-01-01 | End: 2021-01-01 | Stop reason: HOSPADM

## 2021-01-01 RX ORDER — AZACITIDINE 100 MG/1
INJECTION, POWDER, LYOPHILIZED, FOR SOLUTION INTRAVENOUS; SUBCUTANEOUS
Status: ON HOLD | COMMUNITY
Start: 2021-01-01 | End: 2022-01-01 | Stop reason: CLARIF

## 2021-01-01 RX ORDER — SODIUM CHLORIDE 0.9 % (FLUSH) 0.9 %
10 SYRINGE (ML) INJECTION
Status: DISCONTINUED | OUTPATIENT
Start: 2021-01-01 | End: 2021-01-01 | Stop reason: HOSPADM

## 2021-01-01 RX ORDER — FUROSEMIDE 10 MG/ML
20 INJECTION INTRAMUSCULAR; INTRAVENOUS
Status: DISCONTINUED | OUTPATIENT
Start: 2021-01-01 | End: 2021-01-01 | Stop reason: HOSPADM

## 2021-01-01 RX ORDER — FENTANYL CITRATE 50 UG/ML
100 INJECTION, SOLUTION INTRAMUSCULAR; INTRAVENOUS ONCE
Status: CANCELLED | OUTPATIENT
Start: 2021-01-01 | End: 2021-01-01

## 2021-01-01 RX ORDER — CIPROFLOXACIN 500 MG/1
500 TABLET ORAL 2 TIMES DAILY
Qty: 60 TABLET | Refills: 5 | Status: ON HOLD | OUTPATIENT
Start: 2021-01-01 | End: 2022-01-01 | Stop reason: SDUPTHER

## 2021-01-01 RX ORDER — GABAPENTIN 100 MG/1
100 CAPSULE ORAL 2 TIMES DAILY
Qty: 60 CAPSULE | Refills: 2 | Status: SHIPPED | OUTPATIENT
Start: 2021-01-01 | End: 2022-01-01 | Stop reason: SDUPTHER

## 2021-01-01 RX ORDER — LIDOCAINE HYDROCHLORIDE 10 MG/ML
1 INJECTION, SOLUTION EPIDURAL; INFILTRATION; INTRACAUDAL; PERINEURAL ONCE
Status: DISCONTINUED | OUTPATIENT
Start: 2021-01-01 | End: 2021-01-01 | Stop reason: HOSPADM

## 2021-01-01 RX ORDER — ONDANSETRON 8 MG/1
8 TABLET, ORALLY DISINTEGRATING ORAL ONCE
Status: CANCELLED | OUTPATIENT
Start: 2021-01-01

## 2021-01-01 RX ORDER — MIDAZOLAM HYDROCHLORIDE 1 MG/ML
2 INJECTION INTRAMUSCULAR; INTRAVENOUS ONCE
Status: CANCELLED | OUTPATIENT
Start: 2021-01-01 | End: 2021-01-01

## 2021-01-01 RX ORDER — CITALOPRAM 20 MG/1
20 TABLET, FILM COATED ORAL DAILY
Qty: 30 TABLET | Refills: 2 | Status: SHIPPED | OUTPATIENT
Start: 2021-01-01 | End: 2022-01-01 | Stop reason: SDUPTHER

## 2021-01-01 RX ADMIN — AZACITIDINE 145 MG: 100 INJECTION, POWDER, LYOPHILIZED, FOR SOLUTION INTRAVENOUS; SUBCUTANEOUS at 11:07

## 2021-01-01 RX ADMIN — SODIUM CHLORIDE: 0.9 INJECTION, SOLUTION INTRAVENOUS at 01:11

## 2021-01-01 RX ADMIN — HEPARIN 500 UNITS: 100 SYRINGE at 12:10

## 2021-01-01 RX ADMIN — AZACITIDINE 145 MG: 100 INJECTION, POWDER, LYOPHILIZED, FOR SOLUTION INTRAVENOUS; SUBCUTANEOUS at 01:11

## 2021-01-01 RX ADMIN — AZACITIDINE 145 MG: 100 INJECTION, POWDER, LYOPHILIZED, FOR SOLUTION INTRAVENOUS; SUBCUTANEOUS at 11:12

## 2021-01-01 RX ADMIN — HEPARIN 500 UNITS: 100 SYRINGE at 11:11

## 2021-01-01 RX ADMIN — AZACITIDINE 145 MG: 100 INJECTION, POWDER, LYOPHILIZED, FOR SOLUTION INTRAVENOUS; SUBCUTANEOUS at 12:07

## 2021-01-01 RX ADMIN — SODIUM CHLORIDE: 0.9 INJECTION, SOLUTION INTRAVENOUS at 01:08

## 2021-01-01 RX ADMIN — ACETAMINOPHEN 650 MG: 325 TABLET ORAL at 01:07

## 2021-01-01 RX ADMIN — Medication 10 ML: at 04:11

## 2021-01-01 RX ADMIN — ACETAMINOPHEN 650 MG: 325 TABLET ORAL at 01:08

## 2021-01-01 RX ADMIN — HEPARIN 500 UNITS: 100 SYRINGE at 01:07

## 2021-01-01 RX ADMIN — AZACITIDINE 145 MG: 100 INJECTION, POWDER, LYOPHILIZED, FOR SOLUTION INTRAVENOUS; SUBCUTANEOUS at 02:10

## 2021-01-01 RX ADMIN — ONDANSETRON 8 MG: 4 TABLET, ORALLY DISINTEGRATING ORAL at 01:09

## 2021-01-01 RX ADMIN — SODIUM CHLORIDE: 9 INJECTION, SOLUTION INTRAVENOUS at 01:07

## 2021-01-01 RX ADMIN — Medication 10 ML: at 04:09

## 2021-01-01 RX ADMIN — ACETAMINOPHEN 650 MG: 325 TABLET ORAL at 01:09

## 2021-01-01 RX ADMIN — AZACITIDINE 145 MG: 100 INJECTION, POWDER, LYOPHILIZED, FOR SOLUTION INTRAVENOUS; SUBCUTANEOUS at 11:11

## 2021-01-01 RX ADMIN — SODIUM CHLORIDE: 0.9 INJECTION, SOLUTION INTRAVENOUS at 11:11

## 2021-01-01 RX ADMIN — ONDANSETRON 8 MG: 4 TABLET, ORALLY DISINTEGRATING ORAL at 12:11

## 2021-01-01 RX ADMIN — DIPHENHYDRAMINE HYDROCHLORIDE 25 MG: 25 CAPSULE ORAL at 01:12

## 2021-01-01 RX ADMIN — DIPHENHYDRAMINE HYDROCHLORIDE 25 MG: 25 CAPSULE ORAL at 01:10

## 2021-01-01 RX ADMIN — SODIUM CHLORIDE: 0.9 INJECTION, SOLUTION INTRAVENOUS at 02:12

## 2021-01-01 RX ADMIN — DIPHENHYDRAMINE HYDROCHLORIDE 25 MG: 25 CAPSULE ORAL at 01:07

## 2021-01-01 RX ADMIN — ONDANSETRON 8 MG: 4 TABLET, ORALLY DISINTEGRATING ORAL at 11:10

## 2021-01-01 RX ADMIN — Medication 10 ML: at 02:11

## 2021-01-01 RX ADMIN — AZACITIDINE 145 MG: 100 INJECTION, POWDER, LYOPHILIZED, FOR SOLUTION INTRAVENOUS; SUBCUTANEOUS at 04:06

## 2021-01-01 RX ADMIN — ONDANSETRON 8 MG: 4 TABLET, ORALLY DISINTEGRATING ORAL at 02:10

## 2021-01-01 RX ADMIN — AZACITIDINE 145 MG: 100 INJECTION, POWDER, LYOPHILIZED, FOR SOLUTION INTRAVENOUS; SUBCUTANEOUS at 10:07

## 2021-01-01 RX ADMIN — DIPHENHYDRAMINE HYDROCHLORIDE 25 MG: 25 CAPSULE ORAL at 01:09

## 2021-01-01 RX ADMIN — ACETAMINOPHEN 650 MG: 325 TABLET ORAL at 01:11

## 2021-01-01 RX ADMIN — AZACITIDINE 145 MG: 100 INJECTION, POWDER, LYOPHILIZED, FOR SOLUTION INTRAVENOUS; SUBCUTANEOUS at 11:10

## 2021-01-01 RX ADMIN — AZACITIDINE 145 MG: 100 INJECTION, POWDER, LYOPHILIZED, FOR SOLUTION INTRAVENOUS; SUBCUTANEOUS at 01:08

## 2021-01-01 RX ADMIN — AZACITIDINE 145 MG: 100 INJECTION, POWDER, LYOPHILIZED, FOR SOLUTION INTRAVENOUS; SUBCUTANEOUS at 12:12

## 2021-01-01 RX ADMIN — AZACITIDINE 145 MG: 100 INJECTION, POWDER, LYOPHILIZED, FOR SOLUTION INTRAVENOUS; SUBCUTANEOUS at 11:08

## 2021-01-01 RX ADMIN — SODIUM CHLORIDE: 0.9 INJECTION, SOLUTION INTRAVENOUS at 01:12

## 2021-01-01 RX ADMIN — AZACITIDINE 145 MG: 100 INJECTION, POWDER, LYOPHILIZED, FOR SOLUTION INTRAVENOUS; SUBCUTANEOUS at 02:07

## 2021-01-01 RX ADMIN — ONDANSETRON 8 MG: 4 TABLET, ORALLY DISINTEGRATING ORAL at 03:09

## 2021-01-01 RX ADMIN — DIPHENHYDRAMINE HYDROCHLORIDE 25 MG: 25 CAPSULE ORAL at 01:11

## 2021-01-01 RX ADMIN — ACETAMINOPHEN 650 MG: 325 TABLET ORAL at 02:12

## 2021-01-01 RX ADMIN — HEPARIN 500 UNITS: 100 SYRINGE at 04:09

## 2021-01-01 RX ADMIN — AZACITIDINE 145 MG: 100 INJECTION, POWDER, LYOPHILIZED, FOR SOLUTION INTRAVENOUS; SUBCUTANEOUS at 01:06

## 2021-01-01 RX ADMIN — DIPHENHYDRAMINE HYDROCHLORIDE 25 MG: 25 CAPSULE ORAL at 01:08

## 2021-01-01 RX ADMIN — Medication 10 ML: at 01:07

## 2021-01-01 RX ADMIN — ONDANSETRON 8 MG: 4 TABLET, ORALLY DISINTEGRATING ORAL at 12:12

## 2021-01-01 RX ADMIN — AZACITIDINE 145 MG: 100 INJECTION, POWDER, LYOPHILIZED, FOR SOLUTION INTRAVENOUS; SUBCUTANEOUS at 01:12

## 2021-01-01 RX ADMIN — AZACITIDINE 145 MG: 100 INJECTION, POWDER, LYOPHILIZED, FOR SOLUTION INTRAVENOUS; SUBCUTANEOUS at 02:11

## 2021-01-01 RX ADMIN — SODIUM CHLORIDE: 0.9 INJECTION, SOLUTION INTRAVENOUS at 01:10

## 2021-01-01 RX ADMIN — AZACITIDINE 145 MG: 100 INJECTION, POWDER, LYOPHILIZED, FOR SOLUTION INTRAVENOUS; SUBCUTANEOUS at 03:09

## 2021-01-01 RX ADMIN — SODIUM CHLORIDE: 9 INJECTION, SOLUTION INTRAVENOUS at 01:10

## 2021-01-01 RX ADMIN — ACETAMINOPHEN 650 MG: 325 TABLET ORAL at 01:12

## 2021-01-01 RX ADMIN — Medication 10 ML: at 12:10

## 2021-01-01 RX ADMIN — HEPARIN 500 UNITS: 100 SYRINGE at 01:10

## 2021-01-01 RX ADMIN — SODIUM CHLORIDE: 0.9 INJECTION, SOLUTION INTRAVENOUS at 01:09

## 2021-01-01 RX ADMIN — Medication 10 ML: at 03:07

## 2021-01-01 RX ADMIN — AZACITIDINE 145 MG: 100 INJECTION, POWDER, LYOPHILIZED, FOR SOLUTION INTRAVENOUS; SUBCUTANEOUS at 02:09

## 2021-01-01 RX ADMIN — AZACITIDINE 145 MG: 100 INJECTION, POWDER, LYOPHILIZED, FOR SOLUTION INTRAVENOUS; SUBCUTANEOUS at 01:10

## 2021-01-01 RX ADMIN — AZACITIDINE 145 MG: 100 INJECTION, POWDER, LYOPHILIZED, FOR SOLUTION INTRAVENOUS; SUBCUTANEOUS at 01:09

## 2021-01-01 RX ADMIN — ACETAMINOPHEN 650 MG: 325 TABLET ORAL at 01:10

## 2021-01-01 RX ADMIN — HEPARIN 500 UNITS: 100 SYRINGE at 05:10

## 2021-01-01 RX ADMIN — Medication 10 ML: at 11:11

## 2021-01-01 RX ADMIN — ONDANSETRON 8 MG: 4 TABLET, ORALLY DISINTEGRATING ORAL at 01:10

## 2021-01-01 RX ADMIN — ONDANSETRON 8 MG: 4 TABLET, ORALLY DISINTEGRATING ORAL at 11:12

## 2021-01-01 RX ADMIN — ACETAMINOPHEN 650 MG: 325 TABLET ORAL at 11:11

## 2021-01-01 RX ADMIN — AZACITIDINE 145 MG: 100 INJECTION, POWDER, LYOPHILIZED, FOR SOLUTION INTRAVENOUS; SUBCUTANEOUS at 12:11

## 2021-01-01 RX ADMIN — AZACITIDINE 145 MG: 100 INJECTION, POWDER, LYOPHILIZED, FOR SOLUTION INTRAVENOUS; SUBCUTANEOUS at 12:08

## 2021-01-01 RX ADMIN — DIPHENHYDRAMINE HYDROCHLORIDE 25 MG: 25 CAPSULE ORAL at 11:11

## 2021-01-01 RX ADMIN — HEPARIN 500 UNITS: 100 SYRINGE at 03:07

## 2021-01-01 RX ADMIN — HEPARIN 500 UNITS: 100 SYRINGE at 02:11

## 2021-01-01 RX ADMIN — HEPARIN 500 UNITS: 100 SYRINGE at 04:11

## 2021-01-01 RX ADMIN — DIPHENHYDRAMINE HYDROCHLORIDE 25 MG: 25 CAPSULE ORAL at 02:12

## 2021-01-01 RX ADMIN — AZACITIDINE 145 MG: 100 INJECTION, POWDER, LYOPHILIZED, FOR SOLUTION INTRAVENOUS; SUBCUTANEOUS at 12:06

## 2021-01-01 RX ADMIN — SODIUM CHLORIDE, PRESERVATIVE FREE 10 ML: 5 INJECTION INTRAVENOUS at 05:10

## 2021-01-01 RX ADMIN — ONDANSETRON 8 MG: 4 TABLET, ORALLY DISINTEGRATING ORAL at 01:12

## 2021-01-01 RX ADMIN — AZACITIDINE 145 MG: 100 INJECTION, POWDER, LYOPHILIZED, FOR SOLUTION INTRAVENOUS; SUBCUTANEOUS at 04:07

## 2021-01-01 RX ADMIN — IOHEXOL 1000 ML: 9 SOLUTION ORAL at 12:10

## 2021-01-01 RX ADMIN — Medication 10 ML: at 01:10

## 2021-01-04 ENCOUNTER — INFUSION (OUTPATIENT)
Dept: INFUSION THERAPY | Facility: HOSPITAL | Age: 71
End: 2021-01-04
Payer: MEDICARE

## 2021-01-04 ENCOUNTER — PATIENT MESSAGE (OUTPATIENT)
Dept: ADMINISTRATIVE | Facility: HOSPITAL | Age: 71
End: 2021-01-04

## 2021-01-04 ENCOUNTER — OFFICE VISIT (OUTPATIENT)
Dept: HEMATOLOGY/ONCOLOGY | Facility: CLINIC | Age: 71
End: 2021-01-04
Payer: MEDICARE

## 2021-01-04 VITALS
HEIGHT: 65 IN | OXYGEN SATURATION: 97 % | WEIGHT: 175.94 LBS | RESPIRATION RATE: 16 BRPM | TEMPERATURE: 98 F | HEART RATE: 74 BPM | SYSTOLIC BLOOD PRESSURE: 135 MMHG | BODY MASS INDEX: 29.31 KG/M2 | DIASTOLIC BLOOD PRESSURE: 61 MMHG

## 2021-01-04 DIAGNOSIS — D46.C MDS (MYELODYSPLASTIC SYNDROME) WITH 5Q DELETION: Primary | ICD-10-CM

## 2021-01-04 DIAGNOSIS — I10 ESSENTIAL HYPERTENSION: ICD-10-CM

## 2021-01-04 DIAGNOSIS — D70.8 OTHER NEUTROPENIA: ICD-10-CM

## 2021-01-04 DIAGNOSIS — G47.00 INSOMNIA, UNSPECIFIED TYPE: ICD-10-CM

## 2021-01-04 DIAGNOSIS — D63.0 ANEMIA IN NEOPLASTIC DISEASE: ICD-10-CM

## 2021-01-04 DIAGNOSIS — E11.22 CONTROLLED TYPE 2 DIABETES MELLITUS WITH STAGE 3 CHRONIC KIDNEY DISEASE, WITHOUT LONG-TERM CURRENT USE OF INSULIN: ICD-10-CM

## 2021-01-04 DIAGNOSIS — F43.23 ADJUSTMENT DISORDER WITH MIXED ANXIETY AND DEPRESSED MOOD: ICD-10-CM

## 2021-01-04 DIAGNOSIS — I25.10 CORONARY ARTERY DISEASE INVOLVING NATIVE CORONARY ARTERY OF NATIVE HEART WITHOUT ANGINA PECTORIS: ICD-10-CM

## 2021-01-04 DIAGNOSIS — E87.6 HYPOKALEMIA: ICD-10-CM

## 2021-01-04 DIAGNOSIS — N18.30 CONTROLLED TYPE 2 DIABETES MELLITUS WITH STAGE 3 CHRONIC KIDNEY DISEASE, WITHOUT LONG-TERM CURRENT USE OF INSULIN: ICD-10-CM

## 2021-01-04 DIAGNOSIS — M79.10 MYALGIA: ICD-10-CM

## 2021-01-04 PROCEDURE — 3044F PR MOST RECENT HEMOGLOBIN A1C LEVEL <7.0%: ICD-10-PCS | Mod: HCNC,CPTII,S$GLB, | Performed by: NURSE PRACTITIONER

## 2021-01-04 PROCEDURE — 1157F ADVNC CARE PLAN IN RCRD: CPT | Mod: HCNC,S$GLB,, | Performed by: NURSE PRACTITIONER

## 2021-01-04 PROCEDURE — 3075F SYST BP GE 130 - 139MM HG: CPT | Mod: HCNC,CPTII,S$GLB, | Performed by: NURSE PRACTITIONER

## 2021-01-04 PROCEDURE — 99999 PR PBB SHADOW E&M-EST. PATIENT-LVL V: CPT | Mod: PBBFAC,HCNC,, | Performed by: NURSE PRACTITIONER

## 2021-01-04 PROCEDURE — 1125F AMNT PAIN NOTED PAIN PRSNT: CPT | Mod: HCNC,S$GLB,, | Performed by: NURSE PRACTITIONER

## 2021-01-04 PROCEDURE — 3075F PR MOST RECENT SYSTOLIC BLOOD PRESS GE 130-139MM HG: ICD-10-PCS | Mod: HCNC,CPTII,S$GLB, | Performed by: NURSE PRACTITIONER

## 2021-01-04 PROCEDURE — 99999 PR PBB SHADOW E&M-EST. PATIENT-LVL V: ICD-10-PCS | Mod: PBBFAC,HCNC,, | Performed by: NURSE PRACTITIONER

## 2021-01-04 PROCEDURE — 96401 CHEMO ANTI-NEOPL SQ/IM: CPT | Mod: HCNC

## 2021-01-04 PROCEDURE — 3078F DIAST BP <80 MM HG: CPT | Mod: HCNC,CPTII,S$GLB, | Performed by: NURSE PRACTITIONER

## 2021-01-04 PROCEDURE — 3044F HG A1C LEVEL LT 7.0%: CPT | Mod: HCNC,CPTII,S$GLB, | Performed by: NURSE PRACTITIONER

## 2021-01-04 PROCEDURE — 1159F PR MEDICATION LIST DOCUMENTED IN MEDICAL RECORD: ICD-10-PCS | Mod: HCNC,S$GLB,, | Performed by: NURSE PRACTITIONER

## 2021-01-04 PROCEDURE — 3008F PR BODY MASS INDEX (BMI) DOCUMENTED: ICD-10-PCS | Mod: HCNC,CPTII,S$GLB, | Performed by: NURSE PRACTITIONER

## 2021-01-04 PROCEDURE — 99215 OFFICE O/P EST HI 40 MIN: CPT | Mod: HCNC,S$GLB,, | Performed by: NURSE PRACTITIONER

## 2021-01-04 PROCEDURE — 1101F PT FALLS ASSESS-DOCD LE1/YR: CPT | Mod: HCNC,CPTII,S$GLB, | Performed by: NURSE PRACTITIONER

## 2021-01-04 PROCEDURE — 3008F BODY MASS INDEX DOCD: CPT | Mod: HCNC,CPTII,S$GLB, | Performed by: NURSE PRACTITIONER

## 2021-01-04 PROCEDURE — 3078F PR MOST RECENT DIASTOLIC BLOOD PRESSURE < 80 MM HG: ICD-10-PCS | Mod: HCNC,CPTII,S$GLB, | Performed by: NURSE PRACTITIONER

## 2021-01-04 PROCEDURE — 99215 PR OFFICE/OUTPT VISIT, EST, LEVL V, 40-54 MIN: ICD-10-PCS | Mod: HCNC,S$GLB,, | Performed by: NURSE PRACTITIONER

## 2021-01-04 PROCEDURE — 1101F PR PT FALLS ASSESS DOC 0-1 FALLS W/OUT INJ PAST YR: ICD-10-PCS | Mod: HCNC,CPTII,S$GLB, | Performed by: NURSE PRACTITIONER

## 2021-01-04 PROCEDURE — 1157F PR ADVANCE CARE PLAN OR EQUIV PRESENT IN MEDICAL RECORD: ICD-10-PCS | Mod: HCNC,S$GLB,, | Performed by: NURSE PRACTITIONER

## 2021-01-04 PROCEDURE — 3288F PR FALLS RISK ASSESSMENT DOCUMENTED: ICD-10-PCS | Mod: HCNC,CPTII,S$GLB, | Performed by: NURSE PRACTITIONER

## 2021-01-04 PROCEDURE — 1125F PR PAIN SEVERITY QUANTIFIED, PAIN PRESENT: ICD-10-PCS | Mod: HCNC,S$GLB,, | Performed by: NURSE PRACTITIONER

## 2021-01-04 PROCEDURE — 3288F FALL RISK ASSESSMENT DOCD: CPT | Mod: HCNC,CPTII,S$GLB, | Performed by: NURSE PRACTITIONER

## 2021-01-04 PROCEDURE — 63600175 PHARM REV CODE 636 W HCPCS: Mod: JG,HCNC | Performed by: INTERNAL MEDICINE

## 2021-01-04 PROCEDURE — 1159F MED LIST DOCD IN RCRD: CPT | Mod: HCNC,S$GLB,, | Performed by: NURSE PRACTITIONER

## 2021-01-04 RX ORDER — ONDANSETRON 4 MG/1
8 TABLET, ORALLY DISINTEGRATING ORAL ONCE
Status: CANCELLED | OUTPATIENT
Start: 2021-01-04

## 2021-01-04 RX ORDER — AZACITIDINE 100 MG/1
75 INJECTION, POWDER, LYOPHILIZED, FOR SOLUTION INTRAVENOUS; SUBCUTANEOUS
Status: CANCELLED | OUTPATIENT
Start: 2021-01-06

## 2021-01-04 RX ORDER — AZACITIDINE 100 MG/1
75 INJECTION, POWDER, LYOPHILIZED, FOR SOLUTION INTRAVENOUS; SUBCUTANEOUS
Status: CANCELLED | OUTPATIENT
Start: 2021-01-04

## 2021-01-04 RX ORDER — ONDANSETRON 4 MG/1
8 TABLET, ORALLY DISINTEGRATING ORAL ONCE
Status: CANCELLED | OUTPATIENT
Start: 2021-01-05

## 2021-01-04 RX ORDER — AZACITIDINE 100 MG/1
75 INJECTION, POWDER, LYOPHILIZED, FOR SOLUTION INTRAVENOUS; SUBCUTANEOUS
Status: CANCELLED | OUTPATIENT
Start: 2021-01-07

## 2021-01-04 RX ORDER — AZACITIDINE 100 MG/1
75 INJECTION, POWDER, LYOPHILIZED, FOR SOLUTION INTRAVENOUS; SUBCUTANEOUS
Status: COMPLETED | OUTPATIENT
Start: 2021-01-04 | End: 2021-01-04

## 2021-01-04 RX ORDER — AZACITIDINE 100 MG/1
75 INJECTION, POWDER, LYOPHILIZED, FOR SOLUTION INTRAVENOUS; SUBCUTANEOUS
Status: CANCELLED | OUTPATIENT
Start: 2021-01-05

## 2021-01-04 RX ORDER — LORATADINE 10 MG/1
10 TABLET ORAL DAILY PRN
COMMUNITY

## 2021-01-04 RX ORDER — ONDANSETRON 4 MG/1
8 TABLET, ORALLY DISINTEGRATING ORAL ONCE
Status: DISCONTINUED | OUTPATIENT
Start: 2021-01-04 | End: 2021-01-04 | Stop reason: HOSPADM

## 2021-01-04 RX ORDER — AZACITIDINE 100 MG/1
75 INJECTION, POWDER, LYOPHILIZED, FOR SOLUTION INTRAVENOUS; SUBCUTANEOUS
Status: CANCELLED | OUTPATIENT
Start: 2021-01-08

## 2021-01-04 RX ORDER — ONDANSETRON 4 MG/1
8 TABLET, ORALLY DISINTEGRATING ORAL ONCE
Status: CANCELLED | OUTPATIENT
Start: 2021-01-08

## 2021-01-04 RX ORDER — ONDANSETRON 4 MG/1
8 TABLET, ORALLY DISINTEGRATING ORAL ONCE
Status: CANCELLED | OUTPATIENT
Start: 2021-01-06

## 2021-01-04 RX ORDER — ONDANSETRON 4 MG/1
8 TABLET, ORALLY DISINTEGRATING ORAL ONCE
Status: CANCELLED | OUTPATIENT
Start: 2021-01-07

## 2021-01-04 RX ADMIN — AZACITIDINE 145 MG: 100 INJECTION, POWDER, LYOPHILIZED, FOR SOLUTION INTRAVENOUS; SUBCUTANEOUS at 11:01

## 2021-01-05 ENCOUNTER — INFUSION (OUTPATIENT)
Dept: INFUSION THERAPY | Facility: HOSPITAL | Age: 71
End: 2021-01-05
Attending: INTERNAL MEDICINE
Payer: MEDICARE

## 2021-01-05 VITALS
RESPIRATION RATE: 17 BRPM | SYSTOLIC BLOOD PRESSURE: 143 MMHG | HEART RATE: 77 BPM | TEMPERATURE: 99 F | DIASTOLIC BLOOD PRESSURE: 63 MMHG

## 2021-01-05 DIAGNOSIS — D46.C MDS (MYELODYSPLASTIC SYNDROME) WITH 5Q DELETION: Primary | ICD-10-CM

## 2021-01-05 DIAGNOSIS — E87.6 HYPOKALEMIA: ICD-10-CM

## 2021-01-05 PROCEDURE — 96401 CHEMO ANTI-NEOPL SQ/IM: CPT | Mod: HCNC

## 2021-01-05 PROCEDURE — 63600175 PHARM REV CODE 636 W HCPCS: Mod: JG,HCNC | Performed by: INTERNAL MEDICINE

## 2021-01-05 RX ORDER — AZACITIDINE 100 MG/1
75 INJECTION, POWDER, LYOPHILIZED, FOR SOLUTION INTRAVENOUS; SUBCUTANEOUS
Status: COMPLETED | OUTPATIENT
Start: 2021-01-05 | End: 2021-01-05

## 2021-01-05 RX ORDER — ONDANSETRON 4 MG/1
8 TABLET, ORALLY DISINTEGRATING ORAL ONCE
Status: DISCONTINUED | OUTPATIENT
Start: 2021-01-05 | End: 2021-01-05 | Stop reason: HOSPADM

## 2021-01-05 RX ADMIN — AZACITIDINE 145 MG: 100 INJECTION, POWDER, LYOPHILIZED, FOR SOLUTION SUBCUTANEOUS at 11:01

## 2021-01-06 ENCOUNTER — INFUSION (OUTPATIENT)
Dept: INFUSION THERAPY | Facility: HOSPITAL | Age: 71
End: 2021-01-06
Payer: MEDICARE

## 2021-01-06 VITALS
TEMPERATURE: 99 F | HEART RATE: 69 BPM | SYSTOLIC BLOOD PRESSURE: 172 MMHG | DIASTOLIC BLOOD PRESSURE: 71 MMHG | RESPIRATION RATE: 18 BRPM

## 2021-01-06 DIAGNOSIS — D46.C MDS (MYELODYSPLASTIC SYNDROME) WITH 5Q DELETION: Primary | ICD-10-CM

## 2021-01-06 PROCEDURE — 96401 CHEMO ANTI-NEOPL SQ/IM: CPT | Mod: HCNC

## 2021-01-06 PROCEDURE — 63600175 PHARM REV CODE 636 W HCPCS: Mod: JG,HCNC | Performed by: INTERNAL MEDICINE

## 2021-01-06 RX ORDER — AZACITIDINE 100 MG/1
75 INJECTION, POWDER, LYOPHILIZED, FOR SOLUTION INTRAVENOUS; SUBCUTANEOUS
Status: COMPLETED | OUTPATIENT
Start: 2021-01-06 | End: 2021-01-06

## 2021-01-06 RX ADMIN — AZACITIDINE 145 MG: 100 INJECTION, POWDER, LYOPHILIZED, FOR SOLUTION SUBCUTANEOUS at 11:01

## 2021-01-07 ENCOUNTER — INFUSION (OUTPATIENT)
Dept: INFUSION THERAPY | Facility: HOSPITAL | Age: 71
End: 2021-01-07
Payer: MEDICARE

## 2021-01-07 VITALS
RESPIRATION RATE: 18 BRPM | HEART RATE: 66 BPM | DIASTOLIC BLOOD PRESSURE: 73 MMHG | SYSTOLIC BLOOD PRESSURE: 173 MMHG | TEMPERATURE: 99 F

## 2021-01-07 DIAGNOSIS — E87.6 HYPOKALEMIA: ICD-10-CM

## 2021-01-07 DIAGNOSIS — D46.C MDS (MYELODYSPLASTIC SYNDROME) WITH 5Q DELETION: Primary | ICD-10-CM

## 2021-01-07 PROCEDURE — 63600175 PHARM REV CODE 636 W HCPCS: Mod: JG,HCNC | Performed by: INTERNAL MEDICINE

## 2021-01-07 PROCEDURE — 96401 CHEMO ANTI-NEOPL SQ/IM: CPT | Mod: HCNC

## 2021-01-07 RX ORDER — AZACITIDINE 100 MG/1
75 INJECTION, POWDER, LYOPHILIZED, FOR SOLUTION INTRAVENOUS; SUBCUTANEOUS
Status: COMPLETED | OUTPATIENT
Start: 2021-01-07 | End: 2021-01-07

## 2021-01-07 RX ORDER — ONDANSETRON 4 MG/1
8 TABLET, ORALLY DISINTEGRATING ORAL ONCE
Status: DISCONTINUED | OUTPATIENT
Start: 2021-01-07 | End: 2021-01-07 | Stop reason: HOSPADM

## 2021-01-07 RX ADMIN — AZACITIDINE 145 MG: 100 INJECTION, POWDER, LYOPHILIZED, FOR SOLUTION SUBCUTANEOUS at 11:01

## 2021-01-08 ENCOUNTER — INFUSION (OUTPATIENT)
Dept: INFUSION THERAPY | Facility: HOSPITAL | Age: 71
End: 2021-01-08
Payer: MEDICARE

## 2021-01-08 VITALS
RESPIRATION RATE: 18 BRPM | DIASTOLIC BLOOD PRESSURE: 65 MMHG | HEART RATE: 84 BPM | SYSTOLIC BLOOD PRESSURE: 135 MMHG | TEMPERATURE: 99 F

## 2021-01-08 DIAGNOSIS — D46.C MDS (MYELODYSPLASTIC SYNDROME) WITH 5Q DELETION: Primary | ICD-10-CM

## 2021-01-08 PROCEDURE — 96401 CHEMO ANTI-NEOPL SQ/IM: CPT | Mod: HCNC

## 2021-01-08 PROCEDURE — 63600175 PHARM REV CODE 636 W HCPCS: Mod: JG,HCNC | Performed by: INTERNAL MEDICINE

## 2021-01-08 RX ORDER — AZACITIDINE 100 MG/1
75 INJECTION, POWDER, LYOPHILIZED, FOR SOLUTION INTRAVENOUS; SUBCUTANEOUS
Status: COMPLETED | OUTPATIENT
Start: 2021-01-08 | End: 2021-01-08

## 2021-01-08 RX ADMIN — AZACITIDINE 145 MG: 100 INJECTION, POWDER, LYOPHILIZED, FOR SOLUTION SUBCUTANEOUS at 11:01

## 2021-01-11 ENCOUNTER — LAB VISIT (OUTPATIENT)
Dept: LAB | Facility: HOSPITAL | Age: 71
End: 2021-01-11
Payer: MEDICARE

## 2021-01-11 DIAGNOSIS — D46.C MDS (MYELODYSPLASTIC SYNDROME) WITH 5Q DELETION: ICD-10-CM

## 2021-01-11 LAB
ABO + RH BLD: NORMAL
BASOPHILS # BLD AUTO: 0.03 K/UL (ref 0–0.2)
BASOPHILS NFR BLD: 0.9 % (ref 0–1.9)
BLD GP AB SCN CELLS X3 SERPL QL: NORMAL
DIFFERENTIAL METHOD: ABNORMAL
EOSINOPHIL # BLD AUTO: 0.2 K/UL (ref 0–0.5)
EOSINOPHIL NFR BLD: 5.1 % (ref 0–8)
ERYTHROCYTE [DISTWIDTH] IN BLOOD BY AUTOMATED COUNT: 17 % (ref 11.5–14.5)
HCT VFR BLD AUTO: 25.1 % (ref 37–48.5)
HGB BLD-MCNC: 8 G/DL (ref 12–16)
IMM GRANULOCYTES # BLD AUTO: 0.04 K/UL (ref 0–0.04)
IMM GRANULOCYTES NFR BLD AUTO: 1.1 % (ref 0–0.5)
LYMPHOCYTES # BLD AUTO: 1 K/UL (ref 1–4.8)
LYMPHOCYTES NFR BLD: 28.5 % (ref 18–48)
MCH RBC QN AUTO: 31.3 PG (ref 27–31)
MCHC RBC AUTO-ENTMCNC: 31.9 G/DL (ref 32–36)
MCV RBC AUTO: 98 FL (ref 82–98)
MONOCYTES # BLD AUTO: 0.3 K/UL (ref 0.3–1)
MONOCYTES NFR BLD: 8.5 % (ref 4–15)
NEUTROPHILS # BLD AUTO: 2 K/UL (ref 1.8–7.7)
NEUTROPHILS NFR BLD: 55.9 % (ref 38–73)
NRBC BLD-RTO: 0 /100 WBC
PLATELET # BLD AUTO: 188 K/UL (ref 150–350)
PMV BLD AUTO: 11.5 FL (ref 9.2–12.9)
RBC # BLD AUTO: 2.56 M/UL (ref 4–5.4)
WBC # BLD AUTO: 3.51 K/UL (ref 3.9–12.7)

## 2021-01-11 PROCEDURE — 86900 BLOOD TYPING SEROLOGIC ABO: CPT | Mod: HCNC

## 2021-01-11 PROCEDURE — 85025 COMPLETE CBC W/AUTO DIFF WBC: CPT | Mod: HCNC

## 2021-01-11 PROCEDURE — 36415 COLL VENOUS BLD VENIPUNCTURE: CPT | Mod: HCNC

## 2021-01-14 ENCOUNTER — LAB VISIT (OUTPATIENT)
Dept: LAB | Facility: HOSPITAL | Age: 71
End: 2021-01-14
Payer: MEDICARE

## 2021-01-14 DIAGNOSIS — D46.C MDS (MYELODYSPLASTIC SYNDROME) WITH 5Q DELETION: Primary | ICD-10-CM

## 2021-01-14 DIAGNOSIS — D46.C MDS (MYELODYSPLASTIC SYNDROME) WITH 5Q DELETION: ICD-10-CM

## 2021-01-14 LAB
ABO + RH BLD: NORMAL
BASOPHILS # BLD AUTO: 0.03 K/UL (ref 0–0.2)
BASOPHILS NFR BLD: 1 % (ref 0–1.9)
BLD GP AB SCN CELLS X3 SERPL QL: NORMAL
DIFFERENTIAL METHOD: ABNORMAL
EOSINOPHIL # BLD AUTO: 0.1 K/UL (ref 0–0.5)
EOSINOPHIL NFR BLD: 3.4 % (ref 0–8)
ERYTHROCYTE [DISTWIDTH] IN BLOOD BY AUTOMATED COUNT: 16.7 % (ref 11.5–14.5)
HCT VFR BLD AUTO: 22.9 % (ref 37–48.5)
HGB BLD-MCNC: 7.2 G/DL (ref 12–16)
IMM GRANULOCYTES # BLD AUTO: 0.03 K/UL (ref 0–0.04)
IMM GRANULOCYTES NFR BLD AUTO: 1 % (ref 0–0.5)
LYMPHOCYTES # BLD AUTO: 1.1 K/UL (ref 1–4.8)
LYMPHOCYTES NFR BLD: 35.8 % (ref 18–48)
MCH RBC QN AUTO: 31.2 PG (ref 27–31)
MCHC RBC AUTO-ENTMCNC: 31.4 G/DL (ref 32–36)
MCV RBC AUTO: 99 FL (ref 82–98)
MONOCYTES # BLD AUTO: 0.1 K/UL (ref 0.3–1)
MONOCYTES NFR BLD: 4.1 % (ref 4–15)
NEUTROPHILS # BLD AUTO: 1.6 K/UL (ref 1.8–7.7)
NEUTROPHILS NFR BLD: 54.7 % (ref 38–73)
NRBC BLD-RTO: 0 /100 WBC
PLATELET # BLD AUTO: 166 K/UL (ref 150–350)
PMV BLD AUTO: 11.7 FL (ref 9.2–12.9)
RBC # BLD AUTO: 2.31 M/UL (ref 4–5.4)
WBC # BLD AUTO: 2.96 K/UL (ref 3.9–12.7)

## 2021-01-14 PROCEDURE — 86900 BLOOD TYPING SEROLOGIC ABO: CPT | Mod: HCNC

## 2021-01-14 PROCEDURE — 85025 COMPLETE CBC W/AUTO DIFF WBC: CPT | Mod: HCNC

## 2021-01-14 PROCEDURE — 36415 COLL VENOUS BLD VENIPUNCTURE: CPT | Mod: HCNC

## 2021-01-14 RX ORDER — ACETAMINOPHEN 325 MG/1
650 TABLET ORAL
Status: CANCELLED | OUTPATIENT
Start: 2021-01-14

## 2021-01-14 RX ORDER — DIPHENHYDRAMINE HCL 25 MG
25 CAPSULE ORAL
Status: CANCELLED | OUTPATIENT
Start: 2021-01-14

## 2021-01-14 RX ORDER — HYDROCODONE BITARTRATE AND ACETAMINOPHEN 500; 5 MG/1; MG/1
TABLET ORAL ONCE
Status: CANCELLED | OUTPATIENT
Start: 2021-01-14 | End: 2021-01-14

## 2021-01-15 ENCOUNTER — INFUSION (OUTPATIENT)
Dept: INFUSION THERAPY | Facility: HOSPITAL | Age: 71
End: 2021-01-15
Payer: MEDICARE

## 2021-01-15 VITALS
DIASTOLIC BLOOD PRESSURE: 70 MMHG | SYSTOLIC BLOOD PRESSURE: 158 MMHG | TEMPERATURE: 98 F | RESPIRATION RATE: 18 BRPM | HEART RATE: 79 BPM

## 2021-01-15 DIAGNOSIS — D46.9 MDS (MYELODYSPLASTIC SYNDROME): ICD-10-CM

## 2021-01-15 DIAGNOSIS — D46.C MDS (MYELODYSPLASTIC SYNDROME) WITH 5Q DELETION: Primary | ICD-10-CM

## 2021-01-15 PROCEDURE — P9040 RBC LEUKOREDUCED IRRADIATED: HCPCS | Mod: HCNC

## 2021-01-15 PROCEDURE — 36430 TRANSFUSION BLD/BLD COMPNT: CPT | Mod: HCNC

## 2021-01-15 PROCEDURE — A4216 STERILE WATER/SALINE, 10 ML: HCPCS | Mod: HCNC | Performed by: INTERNAL MEDICINE

## 2021-01-15 PROCEDURE — 25000003 PHARM REV CODE 250: Mod: HCNC | Performed by: INTERNAL MEDICINE

## 2021-01-15 PROCEDURE — 86922 COMPATIBILITY TEST ANTIGLOB: CPT | Mod: HCNC

## 2021-01-15 PROCEDURE — 86902 BLOOD TYPE ANTIGEN DONOR EA: CPT | Mod: HCNC

## 2021-01-15 PROCEDURE — 63600175 PHARM REV CODE 636 W HCPCS: Mod: HCNC | Performed by: INTERNAL MEDICINE

## 2021-01-15 RX ORDER — HEPARIN 100 UNIT/ML
500 SYRINGE INTRAVENOUS
Status: COMPLETED | OUTPATIENT
Start: 2021-01-15 | End: 2021-01-15

## 2021-01-15 RX ORDER — DIPHENHYDRAMINE HCL 25 MG
25 CAPSULE ORAL
Status: COMPLETED | OUTPATIENT
Start: 2021-01-15 | End: 2021-01-15

## 2021-01-15 RX ORDER — HYDROCODONE BITARTRATE AND ACETAMINOPHEN 500; 5 MG/1; MG/1
TABLET ORAL ONCE
Status: COMPLETED | OUTPATIENT
Start: 2021-01-15 | End: 2021-01-15

## 2021-01-15 RX ORDER — SODIUM CHLORIDE 0.9 % (FLUSH) 0.9 %
10 SYRINGE (ML) INJECTION
Status: CANCELLED | OUTPATIENT
Start: 2021-01-15

## 2021-01-15 RX ORDER — ACETAMINOPHEN 325 MG/1
650 TABLET ORAL
Status: COMPLETED | OUTPATIENT
Start: 2021-01-15 | End: 2021-01-15

## 2021-01-15 RX ORDER — HEPARIN 100 UNIT/ML
500 SYRINGE INTRAVENOUS
Status: CANCELLED | OUTPATIENT
Start: 2021-01-15

## 2021-01-15 RX ORDER — SODIUM CHLORIDE 0.9 % (FLUSH) 0.9 %
10 SYRINGE (ML) INJECTION
Status: COMPLETED | OUTPATIENT
Start: 2021-01-15 | End: 2021-01-15

## 2021-01-15 RX ADMIN — DIPHENHYDRAMINE HYDROCHLORIDE 25 MG: 25 CAPSULE ORAL at 02:01

## 2021-01-15 RX ADMIN — SODIUM CHLORIDE: 9 INJECTION, SOLUTION INTRAVENOUS at 02:01

## 2021-01-15 RX ADMIN — Medication 10 ML: at 05:01

## 2021-01-15 RX ADMIN — HEPARIN 500 UNITS: 100 SYRINGE at 05:01

## 2021-01-15 RX ADMIN — ACETAMINOPHEN 650 MG: 325 TABLET ORAL at 02:01

## 2021-01-19 ENCOUNTER — LAB VISIT (OUTPATIENT)
Dept: LAB | Facility: HOSPITAL | Age: 71
End: 2021-01-19
Payer: MEDICARE

## 2021-01-19 DIAGNOSIS — D46.C MDS (MYELODYSPLASTIC SYNDROME) WITH 5Q DELETION: ICD-10-CM

## 2021-01-19 LAB
ABO + RH BLD: NORMAL
ANISOCYTOSIS BLD QL SMEAR: SLIGHT
BASOPHILS # BLD AUTO: 0.02 K/UL (ref 0–0.2)
BASOPHILS NFR BLD: 0.7 % (ref 0–1.9)
BLD GP AB SCN CELLS X3 SERPL QL: NORMAL
DIFFERENTIAL METHOD: ABNORMAL
EOSINOPHIL # BLD AUTO: 0.2 K/UL (ref 0–0.5)
EOSINOPHIL NFR BLD: 5.3 % (ref 0–8)
ERYTHROCYTE [DISTWIDTH] IN BLOOD BY AUTOMATED COUNT: 17.1 % (ref 11.5–14.5)
HCT VFR BLD AUTO: 26.4 % (ref 37–48.5)
HGB BLD-MCNC: 8.6 G/DL (ref 12–16)
IMM GRANULOCYTES # BLD AUTO: 0.03 K/UL (ref 0–0.04)
IMM GRANULOCYTES NFR BLD AUTO: 1 % (ref 0–0.5)
LYMPHOCYTES # BLD AUTO: 1 K/UL (ref 1–4.8)
LYMPHOCYTES NFR BLD: 33.8 % (ref 18–48)
MCH RBC QN AUTO: 32 PG (ref 27–31)
MCHC RBC AUTO-ENTMCNC: 32.6 G/DL (ref 32–36)
MCV RBC AUTO: 98 FL (ref 82–98)
MONOCYTES # BLD AUTO: 0.1 K/UL (ref 0.3–1)
MONOCYTES NFR BLD: 2 % (ref 4–15)
NEUTROPHILS # BLD AUTO: 1.7 K/UL (ref 1.8–7.7)
NEUTROPHILS NFR BLD: 57.2 % (ref 38–73)
NRBC BLD-RTO: 0 /100 WBC
PLATELET # BLD AUTO: 121 K/UL (ref 150–350)
PLATELET BLD QL SMEAR: ABNORMAL
PMV BLD AUTO: 11.3 FL (ref 9.2–12.9)
RBC # BLD AUTO: 2.69 M/UL (ref 4–5.4)
WBC # BLD AUTO: 3.02 K/UL (ref 3.9–12.7)

## 2021-01-19 PROCEDURE — 85025 COMPLETE CBC W/AUTO DIFF WBC: CPT | Mod: HCNC

## 2021-01-19 PROCEDURE — 86900 BLOOD TYPING SEROLOGIC ABO: CPT | Mod: HCNC

## 2021-01-19 PROCEDURE — 36415 COLL VENOUS BLD VENIPUNCTURE: CPT | Mod: HCNC

## 2021-01-21 ENCOUNTER — PATIENT MESSAGE (OUTPATIENT)
Dept: HEMATOLOGY/ONCOLOGY | Facility: CLINIC | Age: 71
End: 2021-01-21

## 2021-01-21 ENCOUNTER — LAB VISIT (OUTPATIENT)
Dept: LAB | Facility: HOSPITAL | Age: 71
End: 2021-01-21
Payer: MEDICARE

## 2021-01-21 DIAGNOSIS — D46.C MDS (MYELODYSPLASTIC SYNDROME) WITH 5Q DELETION: ICD-10-CM

## 2021-01-21 LAB
ABO + RH BLD: NORMAL
ANISOCYTOSIS BLD QL SMEAR: SLIGHT
BASOPHILS # BLD AUTO: 0.02 K/UL (ref 0–0.2)
BASOPHILS NFR BLD: 0.7 % (ref 0–1.9)
BLD GP AB SCN CELLS X3 SERPL QL: NORMAL
DACRYOCYTES BLD QL SMEAR: ABNORMAL
DIFFERENTIAL METHOD: ABNORMAL
EOSINOPHIL # BLD AUTO: 0.1 K/UL (ref 0–0.5)
EOSINOPHIL NFR BLD: 4.8 % (ref 0–8)
ERYTHROCYTE [DISTWIDTH] IN BLOOD BY AUTOMATED COUNT: 17.2 % (ref 11.5–14.5)
GIANT PLATELETS BLD QL SMEAR: PRESENT
HCT VFR BLD AUTO: 24.6 % (ref 37–48.5)
HGB BLD-MCNC: 8.1 G/DL (ref 12–16)
HYPOCHROMIA BLD QL SMEAR: ABNORMAL
IMM GRANULOCYTES # BLD AUTO: 0.07 K/UL (ref 0–0.04)
IMM GRANULOCYTES NFR BLD AUTO: 2.6 % (ref 0–0.5)
LYMPHOCYTES # BLD AUTO: 0.9 K/UL (ref 1–4.8)
LYMPHOCYTES NFR BLD: 32.6 % (ref 18–48)
MCH RBC QN AUTO: 31.5 PG (ref 27–31)
MCHC RBC AUTO-ENTMCNC: 32.9 G/DL (ref 32–36)
MCV RBC AUTO: 96 FL (ref 82–98)
MONOCYTES # BLD AUTO: 0 K/UL (ref 0.3–1)
MONOCYTES NFR BLD: 1.1 % (ref 4–15)
NEUTROPHILS # BLD AUTO: 1.6 K/UL (ref 1.8–7.7)
NEUTROPHILS NFR BLD: 58.2 % (ref 38–73)
NRBC BLD-RTO: 0 /100 WBC
OVALOCYTES BLD QL SMEAR: ABNORMAL
PLATELET # BLD AUTO: 140 K/UL (ref 150–350)
PLATELET BLD QL SMEAR: ABNORMAL
PMV BLD AUTO: 11.4 FL (ref 9.2–12.9)
POIKILOCYTOSIS BLD QL SMEAR: SLIGHT
POLYCHROMASIA BLD QL SMEAR: ABNORMAL
RBC # BLD AUTO: 2.57 M/UL (ref 4–5.4)
WBC # BLD AUTO: 2.7 K/UL (ref 3.9–12.7)

## 2021-01-21 PROCEDURE — 36415 COLL VENOUS BLD VENIPUNCTURE: CPT | Mod: HCNC

## 2021-01-21 PROCEDURE — 86900 BLOOD TYPING SEROLOGIC ABO: CPT | Mod: HCNC

## 2021-01-21 PROCEDURE — 85025 COMPLETE CBC W/AUTO DIFF WBC: CPT | Mod: HCNC

## 2021-01-25 ENCOUNTER — LAB VISIT (OUTPATIENT)
Dept: LAB | Facility: HOSPITAL | Age: 71
End: 2021-01-25
Payer: MEDICARE

## 2021-01-25 DIAGNOSIS — D46.C MDS (MYELODYSPLASTIC SYNDROME) WITH 5Q DELETION: ICD-10-CM

## 2021-01-25 LAB
ABO + RH BLD: NORMAL
ANISOCYTOSIS BLD QL SMEAR: SLIGHT
BASO STIPL BLD QL SMEAR: ABNORMAL
BASOPHILS # BLD AUTO: 0.04 K/UL (ref 0–0.2)
BASOPHILS NFR BLD: 1.8 % (ref 0–1.9)
BLD GP AB SCN CELLS X3 SERPL QL: NORMAL
DIFFERENTIAL METHOD: ABNORMAL
EOSINOPHIL # BLD AUTO: 0.2 K/UL (ref 0–0.5)
EOSINOPHIL NFR BLD: 7.4 % (ref 0–8)
ERYTHROCYTE [DISTWIDTH] IN BLOOD BY AUTOMATED COUNT: 17.9 % (ref 11.5–14.5)
GIANT PLATELETS BLD QL SMEAR: PRESENT
HCT VFR BLD AUTO: 22.8 % (ref 37–48.5)
HGB BLD-MCNC: 7.5 G/DL (ref 12–16)
HYPOCHROMIA BLD QL SMEAR: ABNORMAL
IMM GRANULOCYTES # BLD AUTO: 0.02 K/UL (ref 0–0.04)
IMM GRANULOCYTES NFR BLD AUTO: 0.9 % (ref 0–0.5)
LYMPHOCYTES # BLD AUTO: 1 K/UL (ref 1–4.8)
LYMPHOCYTES NFR BLD: 46.5 % (ref 18–48)
MCH RBC QN AUTO: 31.5 PG (ref 27–31)
MCHC RBC AUTO-ENTMCNC: 32.9 G/DL (ref 32–36)
MCV RBC AUTO: 96 FL (ref 82–98)
MONOCYTES # BLD AUTO: 0 K/UL (ref 0.3–1)
MONOCYTES NFR BLD: 1.8 % (ref 4–15)
NEUTROPHILS # BLD AUTO: 0.9 K/UL (ref 1.8–7.7)
NEUTROPHILS NFR BLD: 41.6 % (ref 38–73)
NRBC BLD-RTO: 0 /100 WBC
OVALOCYTES BLD QL SMEAR: ABNORMAL
PLATELET # BLD AUTO: 228 K/UL (ref 150–350)
PLATELET BLD QL SMEAR: ABNORMAL
PMV BLD AUTO: 11.7 FL (ref 9.2–12.9)
POIKILOCYTOSIS BLD QL SMEAR: SLIGHT
POLYCHROMASIA BLD QL SMEAR: ABNORMAL
RBC # BLD AUTO: 2.38 M/UL (ref 4–5.4)
WBC # BLD AUTO: 2.17 K/UL (ref 3.9–12.7)

## 2021-01-25 PROCEDURE — 86900 BLOOD TYPING SEROLOGIC ABO: CPT | Mod: HCNC

## 2021-01-25 PROCEDURE — 36415 COLL VENOUS BLD VENIPUNCTURE: CPT | Mod: HCNC

## 2021-01-25 PROCEDURE — 85025 COMPLETE CBC W/AUTO DIFF WBC: CPT | Mod: HCNC

## 2021-01-28 ENCOUNTER — PATIENT MESSAGE (OUTPATIENT)
Dept: HEMATOLOGY/ONCOLOGY | Facility: CLINIC | Age: 71
End: 2021-01-28

## 2021-01-28 ENCOUNTER — LAB VISIT (OUTPATIENT)
Dept: LAB | Facility: HOSPITAL | Age: 71
End: 2021-01-28
Payer: MEDICARE

## 2021-01-28 DIAGNOSIS — D46.C MDS (MYELODYSPLASTIC SYNDROME) WITH 5Q DELETION: ICD-10-CM

## 2021-01-28 LAB
ABO + RH BLD: NORMAL
ANISOCYTOSIS BLD QL SMEAR: SLIGHT
BASOPHILS # BLD AUTO: 0.05 K/UL (ref 0–0.2)
BASOPHILS NFR BLD: 2.4 % (ref 0–1.9)
BLD GP AB SCN CELLS X3 SERPL QL: NORMAL
DACRYOCYTES BLD QL SMEAR: ABNORMAL
DIFFERENTIAL METHOD: ABNORMAL
EOSINOPHIL # BLD AUTO: 0.2 K/UL (ref 0–0.5)
EOSINOPHIL NFR BLD: 9.3 % (ref 0–8)
ERYTHROCYTE [DISTWIDTH] IN BLOOD BY AUTOMATED COUNT: 18.2 % (ref 11.5–14.5)
GIANT PLATELETS BLD QL SMEAR: PRESENT
HCT VFR BLD AUTO: 23.2 % (ref 37–48.5)
HGB BLD-MCNC: 7.4 G/DL (ref 12–16)
HYPOCHROMIA BLD QL SMEAR: ABNORMAL
IMM GRANULOCYTES # BLD AUTO: 0.01 K/UL (ref 0–0.04)
IMM GRANULOCYTES NFR BLD AUTO: 0.5 % (ref 0–0.5)
LYMPHOCYTES # BLD AUTO: 0.9 K/UL (ref 1–4.8)
LYMPHOCYTES NFR BLD: 44.9 % (ref 18–48)
MCH RBC QN AUTO: 31.4 PG (ref 27–31)
MCHC RBC AUTO-ENTMCNC: 31.9 G/DL (ref 32–36)
MCV RBC AUTO: 98 FL (ref 82–98)
MONOCYTES # BLD AUTO: 0.1 K/UL (ref 0.3–1)
MONOCYTES NFR BLD: 5.9 % (ref 4–15)
NEUTROPHILS # BLD AUTO: 0.8 K/UL (ref 1.8–7.7)
NEUTROPHILS NFR BLD: 37 % (ref 38–73)
NRBC BLD-RTO: 0 /100 WBC
OVALOCYTES BLD QL SMEAR: ABNORMAL
PLATELET # BLD AUTO: 346 K/UL (ref 150–350)
PLATELET BLD QL SMEAR: ABNORMAL
PMV BLD AUTO: 11.6 FL (ref 9.2–12.9)
POIKILOCYTOSIS BLD QL SMEAR: SLIGHT
POLYCHROMASIA BLD QL SMEAR: ABNORMAL
RBC # BLD AUTO: 2.36 M/UL (ref 4–5.4)
WBC # BLD AUTO: 2.05 K/UL (ref 3.9–12.7)

## 2021-01-28 PROCEDURE — 36415 COLL VENOUS BLD VENIPUNCTURE: CPT | Mod: HCNC

## 2021-01-28 PROCEDURE — 86900 BLOOD TYPING SEROLOGIC ABO: CPT | Mod: HCNC

## 2021-01-28 PROCEDURE — 85025 COMPLETE CBC W/AUTO DIFF WBC: CPT | Mod: HCNC

## 2021-01-29 DIAGNOSIS — D64.9 ANEMIA REQUIRING TRANSFUSIONS: Primary | ICD-10-CM

## 2021-01-29 RX ORDER — ACETAMINOPHEN 325 MG/1
650 TABLET ORAL
Status: CANCELLED | OUTPATIENT
Start: 2021-01-29

## 2021-01-29 RX ORDER — HYDROCODONE BITARTRATE AND ACETAMINOPHEN 500; 5 MG/1; MG/1
TABLET ORAL ONCE
Status: CANCELLED | OUTPATIENT
Start: 2021-01-29 | End: 2021-01-29

## 2021-02-01 ENCOUNTER — INFUSION (OUTPATIENT)
Dept: INFUSION THERAPY | Facility: HOSPITAL | Age: 71
End: 2021-02-01
Attending: INTERNAL MEDICINE
Payer: MEDICARE

## 2021-02-01 ENCOUNTER — LAB VISIT (OUTPATIENT)
Dept: LAB | Facility: HOSPITAL | Age: 71
End: 2021-02-01
Attending: INTERNAL MEDICINE
Payer: MEDICARE

## 2021-02-01 ENCOUNTER — OFFICE VISIT (OUTPATIENT)
Dept: HEMATOLOGY/ONCOLOGY | Facility: CLINIC | Age: 71
End: 2021-02-01
Payer: MEDICARE

## 2021-02-01 VITALS
TEMPERATURE: 98 F | DIASTOLIC BLOOD PRESSURE: 66 MMHG | HEART RATE: 70 BPM | SYSTOLIC BLOOD PRESSURE: 145 MMHG | RESPIRATION RATE: 18 BRPM

## 2021-02-01 VITALS
HEIGHT: 65 IN | WEIGHT: 172.06 LBS | DIASTOLIC BLOOD PRESSURE: 57 MMHG | HEART RATE: 66 BPM | RESPIRATION RATE: 16 BRPM | BODY MASS INDEX: 28.67 KG/M2 | SYSTOLIC BLOOD PRESSURE: 117 MMHG | OXYGEN SATURATION: 98 %

## 2021-02-01 DIAGNOSIS — D75.9 CYTOPENIA: ICD-10-CM

## 2021-02-01 DIAGNOSIS — E87.6 HYPOKALEMIA: ICD-10-CM

## 2021-02-01 DIAGNOSIS — G47.00 INSOMNIA, UNSPECIFIED TYPE: ICD-10-CM

## 2021-02-01 DIAGNOSIS — M54.31 BILATERAL SCIATICA: ICD-10-CM

## 2021-02-01 DIAGNOSIS — D46.C MDS (MYELODYSPLASTIC SYNDROME) WITH 5Q DELETION: Primary | ICD-10-CM

## 2021-02-01 DIAGNOSIS — M54.32 BILATERAL SCIATICA: ICD-10-CM

## 2021-02-01 DIAGNOSIS — E11.9 TYPE 2 DIABETES MELLITUS WITHOUT COMPLICATION, UNSPECIFIED WHETHER LONG TERM INSULIN USE: ICD-10-CM

## 2021-02-01 DIAGNOSIS — D46.C MDS (MYELODYSPLASTIC SYNDROME) WITH 5Q DELETION: ICD-10-CM

## 2021-02-01 DIAGNOSIS — D64.9 ANEMIA REQUIRING TRANSFUSIONS: ICD-10-CM

## 2021-02-01 DIAGNOSIS — I25.10 CORONARY ARTERY DISEASE INVOLVING NATIVE CORONARY ARTERY OF NATIVE HEART WITHOUT ANGINA PECTORIS: ICD-10-CM

## 2021-02-01 DIAGNOSIS — F43.23 ADJUSTMENT DISORDER WITH MIXED ANXIETY AND DEPRESSED MOOD: ICD-10-CM

## 2021-02-01 DIAGNOSIS — I10 ESSENTIAL HYPERTENSION: ICD-10-CM

## 2021-02-01 DIAGNOSIS — M79.10 MYALGIA: ICD-10-CM

## 2021-02-01 LAB
ABO + RH BLD: NORMAL
ALBUMIN SERPL BCP-MCNC: 3.8 G/DL (ref 3.5–5.2)
ALP SERPL-CCNC: 64 U/L (ref 55–135)
ALT SERPL W/O P-5'-P-CCNC: 38 U/L (ref 10–44)
ANION GAP SERPL CALC-SCNC: 10 MMOL/L (ref 8–16)
ANISOCYTOSIS BLD QL SMEAR: SLIGHT
AST SERPL-CCNC: 24 U/L (ref 10–40)
BASOPHILS # BLD AUTO: 0.03 K/UL (ref 0–0.2)
BASOPHILS NFR BLD: 1.4 % (ref 0–1.9)
BILIRUB SERPL-MCNC: 0.4 MG/DL (ref 0.1–1)
BLD GP AB SCN CELLS X3 SERPL QL: NORMAL
BUN SERPL-MCNC: 33 MG/DL (ref 8–23)
CALCIUM SERPL-MCNC: 9.2 MG/DL (ref 8.7–10.5)
CHLORIDE SERPL-SCNC: 103 MMOL/L (ref 95–110)
CO2 SERPL-SCNC: 23 MMOL/L (ref 23–29)
CREAT SERPL-MCNC: 1.3 MG/DL (ref 0.5–1.4)
DIFFERENTIAL METHOD: ABNORMAL
EOSINOPHIL # BLD AUTO: 0.2 K/UL (ref 0–0.5)
EOSINOPHIL NFR BLD: 8.1 % (ref 0–8)
ERYTHROCYTE [DISTWIDTH] IN BLOOD BY AUTOMATED COUNT: 19.3 % (ref 11.5–14.5)
EST. GFR  (AFRICAN AMERICAN): 48 ML/MIN/1.73 M^2
EST. GFR  (NON AFRICAN AMERICAN): 41.7 ML/MIN/1.73 M^2
GLUCOSE SERPL-MCNC: 106 MG/DL (ref 70–110)
HCT VFR BLD AUTO: 20.2 % (ref 37–48.5)
HGB BLD-MCNC: 6.5 G/DL (ref 12–16)
IMM GRANULOCYTES # BLD AUTO: 0.01 K/UL (ref 0–0.04)
IMM GRANULOCYTES NFR BLD AUTO: 0.5 % (ref 0–0.5)
LYMPHOCYTES # BLD AUTO: 1 K/UL (ref 1–4.8)
LYMPHOCYTES NFR BLD: 46 % (ref 18–48)
MCH RBC QN AUTO: 32 PG (ref 27–31)
MCHC RBC AUTO-ENTMCNC: 32.2 G/DL (ref 32–36)
MCV RBC AUTO: 100 FL (ref 82–98)
MONOCYTES # BLD AUTO: 0.2 K/UL (ref 0.3–1)
MONOCYTES NFR BLD: 8.5 % (ref 4–15)
NEUTROPHILS # BLD AUTO: 0.8 K/UL (ref 1.8–7.7)
NEUTROPHILS NFR BLD: 35.5 % (ref 38–73)
NRBC BLD-RTO: 0 /100 WBC
PLATELET # BLD AUTO: 346 K/UL (ref 150–350)
PLATELET BLD QL SMEAR: ABNORMAL
PMV BLD AUTO: 11.3 FL (ref 9.2–12.9)
POTASSIUM SERPL-SCNC: 4.4 MMOL/L (ref 3.5–5.1)
PROT SERPL-MCNC: 6.8 G/DL (ref 6–8.4)
RBC # BLD AUTO: 2.03 M/UL (ref 4–5.4)
SODIUM SERPL-SCNC: 136 MMOL/L (ref 136–145)
WBC # BLD AUTO: 2.11 K/UL (ref 3.9–12.7)

## 2021-02-01 PROCEDURE — 99999 PR PBB SHADOW E&M-EST. PATIENT-LVL V: ICD-10-PCS | Mod: PBBFAC,HCNC,, | Performed by: NURSE PRACTITIONER

## 2021-02-01 PROCEDURE — A4216 STERILE WATER/SALINE, 10 ML: HCPCS | Mod: HCNC | Performed by: INTERNAL MEDICINE

## 2021-02-01 PROCEDURE — 1157F PR ADVANCE CARE PLAN OR EQUIV PRESENT IN MEDICAL RECORD: ICD-10-PCS | Mod: HCNC,S$GLB,, | Performed by: NURSE PRACTITIONER

## 2021-02-01 PROCEDURE — 36430 TRANSFUSION BLD/BLD COMPNT: CPT | Mod: HCNC

## 2021-02-01 PROCEDURE — 3044F PR MOST RECENT HEMOGLOBIN A1C LEVEL <7.0%: ICD-10-PCS | Mod: HCNC,CPTII,S$GLB, | Performed by: NURSE PRACTITIONER

## 2021-02-01 PROCEDURE — 3288F FALL RISK ASSESSMENT DOCD: CPT | Mod: HCNC,CPTII,S$GLB, | Performed by: NURSE PRACTITIONER

## 2021-02-01 PROCEDURE — 3078F PR MOST RECENT DIASTOLIC BLOOD PRESSURE < 80 MM HG: ICD-10-PCS | Mod: HCNC,CPTII,S$GLB, | Performed by: NURSE PRACTITIONER

## 2021-02-01 PROCEDURE — 3044F HG A1C LEVEL LT 7.0%: CPT | Mod: HCNC,CPTII,S$GLB, | Performed by: NURSE PRACTITIONER

## 2021-02-01 PROCEDURE — 25000003 PHARM REV CODE 250: Mod: HCNC | Performed by: INTERNAL MEDICINE

## 2021-02-01 PROCEDURE — 86900 BLOOD TYPING SEROLOGIC ABO: CPT | Mod: HCNC

## 2021-02-01 PROCEDURE — 3008F BODY MASS INDEX DOCD: CPT | Mod: HCNC,CPTII,S$GLB, | Performed by: NURSE PRACTITIONER

## 2021-02-01 PROCEDURE — 3078F DIAST BP <80 MM HG: CPT | Mod: HCNC,CPTII,S$GLB, | Performed by: NURSE PRACTITIONER

## 2021-02-01 PROCEDURE — 85025 COMPLETE CBC W/AUTO DIFF WBC: CPT | Mod: HCNC

## 2021-02-01 PROCEDURE — 1101F PR PT FALLS ASSESS DOC 0-1 FALLS W/OUT INJ PAST YR: ICD-10-PCS | Mod: HCNC,CPTII,S$GLB, | Performed by: NURSE PRACTITIONER

## 2021-02-01 PROCEDURE — 99999 PR PBB SHADOW E&M-EST. PATIENT-LVL V: CPT | Mod: PBBFAC,HCNC,, | Performed by: NURSE PRACTITIONER

## 2021-02-01 PROCEDURE — 96413 CHEMO IV INFUSION 1 HR: CPT | Mod: HCNC

## 2021-02-01 PROCEDURE — 99214 PR OFFICE/OUTPT VISIT, EST, LEVL IV, 30-39 MIN: ICD-10-PCS | Mod: HCNC,S$GLB,, | Performed by: NURSE PRACTITIONER

## 2021-02-01 PROCEDURE — 25000003 PHARM REV CODE 250: Mod: HCNC | Performed by: NURSE PRACTITIONER

## 2021-02-01 PROCEDURE — 1159F PR MEDICATION LIST DOCUMENTED IN MEDICAL RECORD: ICD-10-PCS | Mod: HCNC,S$GLB,, | Performed by: NURSE PRACTITIONER

## 2021-02-01 PROCEDURE — 1126F AMNT PAIN NOTED NONE PRSNT: CPT | Mod: HCNC,S$GLB,, | Performed by: NURSE PRACTITIONER

## 2021-02-01 PROCEDURE — 3074F PR MOST RECENT SYSTOLIC BLOOD PRESSURE < 130 MM HG: ICD-10-PCS | Mod: HCNC,CPTII,S$GLB, | Performed by: NURSE PRACTITIONER

## 2021-02-01 PROCEDURE — 3074F SYST BP LT 130 MM HG: CPT | Mod: HCNC,CPTII,S$GLB, | Performed by: NURSE PRACTITIONER

## 2021-02-01 PROCEDURE — 63600175 PHARM REV CODE 636 W HCPCS: Mod: JG,HCNC | Performed by: INTERNAL MEDICINE

## 2021-02-01 PROCEDURE — 1159F MED LIST DOCD IN RCRD: CPT | Mod: HCNC,S$GLB,, | Performed by: NURSE PRACTITIONER

## 2021-02-01 PROCEDURE — 99214 OFFICE O/P EST MOD 30 MIN: CPT | Mod: HCNC,S$GLB,, | Performed by: NURSE PRACTITIONER

## 2021-02-01 PROCEDURE — 3008F PR BODY MASS INDEX (BMI) DOCUMENTED: ICD-10-PCS | Mod: HCNC,CPTII,S$GLB, | Performed by: NURSE PRACTITIONER

## 2021-02-01 PROCEDURE — 1101F PT FALLS ASSESS-DOCD LE1/YR: CPT | Mod: HCNC,CPTII,S$GLB, | Performed by: NURSE PRACTITIONER

## 2021-02-01 PROCEDURE — 36415 COLL VENOUS BLD VENIPUNCTURE: CPT | Mod: HCNC

## 2021-02-01 PROCEDURE — 1126F PR PAIN SEVERITY QUANTIFIED, NO PAIN PRESENT: ICD-10-PCS | Mod: HCNC,S$GLB,, | Performed by: NURSE PRACTITIONER

## 2021-02-01 PROCEDURE — 3288F PR FALLS RISK ASSESSMENT DOCUMENTED: ICD-10-PCS | Mod: HCNC,CPTII,S$GLB, | Performed by: NURSE PRACTITIONER

## 2021-02-01 PROCEDURE — 1157F ADVNC CARE PLAN IN RCRD: CPT | Mod: HCNC,S$GLB,, | Performed by: NURSE PRACTITIONER

## 2021-02-01 PROCEDURE — 80053 COMPREHEN METABOLIC PANEL: CPT | Mod: HCNC

## 2021-02-01 RX ORDER — SODIUM CHLORIDE 0.9 % (FLUSH) 0.9 %
10 SYRINGE (ML) INJECTION
Status: DISCONTINUED | OUTPATIENT
Start: 2021-02-01 | End: 2021-02-01 | Stop reason: HOSPADM

## 2021-02-01 RX ORDER — SODIUM CHLORIDE 0.9 % (FLUSH) 0.9 %
10 SYRINGE (ML) INJECTION
Status: CANCELLED | OUTPATIENT
Start: 2021-02-05

## 2021-02-01 RX ORDER — HYDROCODONE BITARTRATE AND ACETAMINOPHEN 500; 5 MG/1; MG/1
TABLET ORAL ONCE
Status: COMPLETED | OUTPATIENT
Start: 2021-02-01 | End: 2021-02-01

## 2021-02-01 RX ORDER — ONDANSETRON 8 MG/1
8 TABLET, ORALLY DISINTEGRATING ORAL ONCE
Status: CANCELLED | OUTPATIENT
Start: 2021-02-04 | End: 2021-02-04

## 2021-02-01 RX ORDER — ACETAMINOPHEN 325 MG/1
650 TABLET ORAL
Status: COMPLETED | OUTPATIENT
Start: 2021-02-01 | End: 2021-02-01

## 2021-02-01 RX ORDER — SODIUM CHLORIDE 0.9 % (FLUSH) 0.9 %
10 SYRINGE (ML) INJECTION
Status: CANCELLED | OUTPATIENT
Start: 2021-02-04

## 2021-02-01 RX ORDER — HEPARIN 100 UNIT/ML
500 SYRINGE INTRAVENOUS
Status: CANCELLED | OUTPATIENT
Start: 2021-02-03

## 2021-02-01 RX ORDER — SODIUM CHLORIDE 0.9 % (FLUSH) 0.9 %
10 SYRINGE (ML) INJECTION
Status: CANCELLED | OUTPATIENT
Start: 2021-02-01

## 2021-02-01 RX ORDER — ONDANSETRON 8 MG/1
8 TABLET, ORALLY DISINTEGRATING ORAL ONCE
Status: CANCELLED | OUTPATIENT
Start: 2021-02-01 | End: 2021-02-01

## 2021-02-01 RX ORDER — HEPARIN 100 UNIT/ML
500 SYRINGE INTRAVENOUS
Status: CANCELLED | OUTPATIENT
Start: 2021-02-05

## 2021-02-01 RX ORDER — HEPARIN 100 UNIT/ML
500 SYRINGE INTRAVENOUS
Status: CANCELLED | OUTPATIENT
Start: 2021-02-02

## 2021-02-01 RX ORDER — SODIUM CHLORIDE 0.9 % (FLUSH) 0.9 %
10 SYRINGE (ML) INJECTION
Status: CANCELLED | OUTPATIENT
Start: 2021-02-02

## 2021-02-01 RX ORDER — ONDANSETRON 8 MG/1
8 TABLET, ORALLY DISINTEGRATING ORAL ONCE
Status: CANCELLED | OUTPATIENT
Start: 2021-02-05 | End: 2021-02-05

## 2021-02-01 RX ORDER — SODIUM CHLORIDE 0.9 % (FLUSH) 0.9 %
10 SYRINGE (ML) INJECTION
Status: CANCELLED | OUTPATIENT
Start: 2021-02-03

## 2021-02-01 RX ORDER — ONDANSETRON 8 MG/1
8 TABLET, ORALLY DISINTEGRATING ORAL ONCE
Status: CANCELLED | OUTPATIENT
Start: 2021-02-02 | End: 2021-02-02

## 2021-02-01 RX ORDER — HEPARIN 100 UNIT/ML
500 SYRINGE INTRAVENOUS
Status: DISCONTINUED | OUTPATIENT
Start: 2021-02-01 | End: 2021-02-01 | Stop reason: HOSPADM

## 2021-02-01 RX ORDER — HEPARIN 100 UNIT/ML
500 SYRINGE INTRAVENOUS
Status: CANCELLED | OUTPATIENT
Start: 2021-02-04

## 2021-02-01 RX ORDER — HEPARIN 100 UNIT/ML
500 SYRINGE INTRAVENOUS
Status: CANCELLED | OUTPATIENT
Start: 2021-02-01

## 2021-02-01 RX ORDER — GABAPENTIN 100 MG/1
100 CAPSULE ORAL 2 TIMES DAILY
Qty: 60 CAPSULE | Refills: 2
Start: 2021-02-01 | End: 2021-02-17 | Stop reason: SDUPTHER

## 2021-02-01 RX ORDER — ONDANSETRON 8 MG/1
8 TABLET, ORALLY DISINTEGRATING ORAL ONCE
Status: CANCELLED | OUTPATIENT
Start: 2021-02-03 | End: 2021-02-03

## 2021-02-01 RX ORDER — ONDANSETRON 4 MG/1
8 TABLET, ORALLY DISINTEGRATING ORAL ONCE
Status: COMPLETED | OUTPATIENT
Start: 2021-02-01 | End: 2021-02-01

## 2021-02-01 RX ADMIN — DECITABINE 40 MG: 50 INJECTION, POWDER, LYOPHILIZED, FOR SOLUTION INTRAVENOUS at 02:02

## 2021-02-01 RX ADMIN — ACETAMINOPHEN 650 MG: 325 TABLET ORAL at 12:02

## 2021-02-01 RX ADMIN — Medication 10 ML: at 03:02

## 2021-02-01 RX ADMIN — SODIUM CHLORIDE: 0.9 INJECTION, SOLUTION INTRAVENOUS at 12:02

## 2021-02-01 RX ADMIN — HEPARIN 500 UNITS: 100 SYRINGE at 03:02

## 2021-02-01 RX ADMIN — ONDANSETRON 8 MG: 4 TABLET, ORALLY DISINTEGRATING ORAL at 12:02

## 2021-02-02 ENCOUNTER — INFUSION (OUTPATIENT)
Dept: INFUSION THERAPY | Facility: HOSPITAL | Age: 71
End: 2021-02-02
Attending: INTERNAL MEDICINE
Payer: MEDICARE

## 2021-02-02 VITALS
TEMPERATURE: 99 F | HEART RATE: 81 BPM | RESPIRATION RATE: 17 BRPM | DIASTOLIC BLOOD PRESSURE: 63 MMHG | SYSTOLIC BLOOD PRESSURE: 123 MMHG | OXYGEN SATURATION: 97 %

## 2021-02-02 DIAGNOSIS — E87.6 HYPOKALEMIA: ICD-10-CM

## 2021-02-02 DIAGNOSIS — D64.9 ANEMIA REQUIRING TRANSFUSIONS: ICD-10-CM

## 2021-02-02 DIAGNOSIS — D46.C MDS (MYELODYSPLASTIC SYNDROME) WITH 5Q DELETION: Primary | ICD-10-CM

## 2021-02-02 DIAGNOSIS — Z51.89 ENCOUNTER FOR BLOOD TRANSFUSION: ICD-10-CM

## 2021-02-02 PROCEDURE — 63600175 PHARM REV CODE 636 W HCPCS: Mod: JG,HCNC | Performed by: INTERNAL MEDICINE

## 2021-02-02 PROCEDURE — 25000003 PHARM REV CODE 250: Mod: HCNC | Performed by: INTERNAL MEDICINE

## 2021-02-02 PROCEDURE — 96413 CHEMO IV INFUSION 1 HR: CPT | Mod: HCNC

## 2021-02-02 RX ORDER — HEPARIN 100 UNIT/ML
500 SYRINGE INTRAVENOUS
Status: DISCONTINUED | OUTPATIENT
Start: 2021-02-02 | End: 2021-02-02 | Stop reason: HOSPADM

## 2021-02-02 RX ORDER — SODIUM CHLORIDE 0.9 % (FLUSH) 0.9 %
10 SYRINGE (ML) INJECTION
Status: DISCONTINUED | OUTPATIENT
Start: 2021-02-02 | End: 2021-02-02 | Stop reason: HOSPADM

## 2021-02-02 RX ORDER — ONDANSETRON 4 MG/1
8 TABLET, ORALLY DISINTEGRATING ORAL ONCE
Status: COMPLETED | OUTPATIENT
Start: 2021-02-02 | End: 2021-02-02

## 2021-02-02 RX ADMIN — DECITABINE 40 MG: 50 INJECTION, POWDER, LYOPHILIZED, FOR SOLUTION INTRAVENOUS at 02:02

## 2021-02-02 RX ADMIN — HEPARIN 500 UNITS: 100 SYRINGE at 03:02

## 2021-02-02 RX ADMIN — ONDANSETRON 8 MG: 4 TABLET, ORALLY DISINTEGRATING ORAL at 02:02

## 2021-02-03 ENCOUNTER — PATIENT MESSAGE (OUTPATIENT)
Dept: HEMATOLOGY/ONCOLOGY | Facility: CLINIC | Age: 71
End: 2021-02-03

## 2021-02-03 ENCOUNTER — INFUSION (OUTPATIENT)
Dept: INFUSION THERAPY | Facility: HOSPITAL | Age: 71
End: 2021-02-03
Attending: INTERNAL MEDICINE
Payer: MEDICARE

## 2021-02-03 VITALS
HEART RATE: 67 BPM | DIASTOLIC BLOOD PRESSURE: 68 MMHG | SYSTOLIC BLOOD PRESSURE: 148 MMHG | TEMPERATURE: 98 F | OXYGEN SATURATION: 96 % | RESPIRATION RATE: 16 BRPM

## 2021-02-03 DIAGNOSIS — D46.C MDS (MYELODYSPLASTIC SYNDROME) WITH 5Q DELETION: Primary | ICD-10-CM

## 2021-02-03 DIAGNOSIS — E87.6 HYPOKALEMIA: ICD-10-CM

## 2021-02-03 PROCEDURE — A4216 STERILE WATER/SALINE, 10 ML: HCPCS | Mod: HCNC | Performed by: INTERNAL MEDICINE

## 2021-02-03 PROCEDURE — 96413 CHEMO IV INFUSION 1 HR: CPT | Mod: HCNC

## 2021-02-03 PROCEDURE — 63600175 PHARM REV CODE 636 W HCPCS: Mod: JG,HCNC | Performed by: INTERNAL MEDICINE

## 2021-02-03 PROCEDURE — 25000003 PHARM REV CODE 250: Mod: HCNC | Performed by: INTERNAL MEDICINE

## 2021-02-03 RX ORDER — SODIUM CHLORIDE 0.9 % (FLUSH) 0.9 %
10 SYRINGE (ML) INJECTION
Status: DISCONTINUED | OUTPATIENT
Start: 2021-02-03 | End: 2021-02-03 | Stop reason: HOSPADM

## 2021-02-03 RX ORDER — HEPARIN 100 UNIT/ML
500 SYRINGE INTRAVENOUS
Status: DISCONTINUED | OUTPATIENT
Start: 2021-02-03 | End: 2021-02-03 | Stop reason: HOSPADM

## 2021-02-03 RX ORDER — ONDANSETRON 4 MG/1
8 TABLET, ORALLY DISINTEGRATING ORAL ONCE
Status: COMPLETED | OUTPATIENT
Start: 2021-02-03 | End: 2021-02-03

## 2021-02-03 RX ADMIN — DECITABINE 40 MG: 50 INJECTION, POWDER, LYOPHILIZED, FOR SOLUTION INTRAVENOUS at 01:02

## 2021-02-03 RX ADMIN — Medication 10 ML: at 02:02

## 2021-02-03 RX ADMIN — ONDANSETRON 8 MG: 4 TABLET, ORALLY DISINTEGRATING ORAL at 01:02

## 2021-02-03 RX ADMIN — HEPARIN 500 UNITS: 100 SYRINGE at 02:02

## 2021-02-04 ENCOUNTER — INFUSION (OUTPATIENT)
Dept: INFUSION THERAPY | Facility: HOSPITAL | Age: 71
End: 2021-02-04
Attending: INTERNAL MEDICINE
Payer: MEDICARE

## 2021-02-04 VITALS
HEART RATE: 70 BPM | SYSTOLIC BLOOD PRESSURE: 162 MMHG | OXYGEN SATURATION: 95 % | TEMPERATURE: 98 F | RESPIRATION RATE: 16 BRPM | DIASTOLIC BLOOD PRESSURE: 80 MMHG

## 2021-02-04 DIAGNOSIS — E87.6 HYPOKALEMIA: ICD-10-CM

## 2021-02-04 DIAGNOSIS — D46.C MDS (MYELODYSPLASTIC SYNDROME) WITH 5Q DELETION: Primary | ICD-10-CM

## 2021-02-04 PROCEDURE — 63600175 PHARM REV CODE 636 W HCPCS: Mod: JG,HCNC | Performed by: INTERNAL MEDICINE

## 2021-02-04 PROCEDURE — 25000003 PHARM REV CODE 250: Mod: HCNC | Performed by: INTERNAL MEDICINE

## 2021-02-04 PROCEDURE — 96413 CHEMO IV INFUSION 1 HR: CPT | Mod: HCNC

## 2021-02-04 RX ORDER — ONDANSETRON 4 MG/1
8 TABLET, ORALLY DISINTEGRATING ORAL ONCE
Status: COMPLETED | OUTPATIENT
Start: 2021-02-04 | End: 2021-02-04

## 2021-02-04 RX ORDER — SODIUM CHLORIDE 0.9 % (FLUSH) 0.9 %
10 SYRINGE (ML) INJECTION
Status: DISCONTINUED | OUTPATIENT
Start: 2021-02-04 | End: 2021-02-04 | Stop reason: HOSPADM

## 2021-02-04 RX ORDER — HEPARIN 100 UNIT/ML
500 SYRINGE INTRAVENOUS
Status: DISCONTINUED | OUTPATIENT
Start: 2021-02-04 | End: 2021-02-04 | Stop reason: HOSPADM

## 2021-02-04 RX ADMIN — DECITABINE 40 MG: 50 INJECTION, POWDER, LYOPHILIZED, FOR SOLUTION INTRAVENOUS at 03:02

## 2021-02-04 RX ADMIN — ONDANSETRON 8 MG: 4 TABLET, ORALLY DISINTEGRATING ORAL at 03:02

## 2021-02-04 RX ADMIN — HEPARIN 500 UNITS: 100 SYRINGE at 04:02

## 2021-02-05 ENCOUNTER — INFUSION (OUTPATIENT)
Dept: INFUSION THERAPY | Facility: HOSPITAL | Age: 71
End: 2021-02-05
Attending: INTERNAL MEDICINE
Payer: MEDICARE

## 2021-02-05 VITALS
OXYGEN SATURATION: 97 % | HEART RATE: 68 BPM | DIASTOLIC BLOOD PRESSURE: 67 MMHG | RESPIRATION RATE: 16 BRPM | SYSTOLIC BLOOD PRESSURE: 155 MMHG

## 2021-02-05 DIAGNOSIS — D46.C MDS (MYELODYSPLASTIC SYNDROME) WITH 5Q DELETION: Primary | ICD-10-CM

## 2021-02-05 DIAGNOSIS — E87.6 HYPOKALEMIA: ICD-10-CM

## 2021-02-05 LAB
ANISOCYTOSIS BLD QL SMEAR: SLIGHT
BASOPHILS # BLD AUTO: 0.03 K/UL (ref 0–0.2)
BASOPHILS NFR BLD: 1.4 % (ref 0–1.9)
DIFFERENTIAL METHOD: ABNORMAL
EOSINOPHIL # BLD AUTO: 0.2 K/UL (ref 0–0.5)
EOSINOPHIL NFR BLD: 8.8 % (ref 0–8)
ERYTHROCYTE [DISTWIDTH] IN BLOOD BY AUTOMATED COUNT: 19 % (ref 11.5–14.5)
HCT VFR BLD AUTO: 22.6 % (ref 37–48.5)
HGB BLD-MCNC: 7.2 G/DL (ref 12–16)
HYPOCHROMIA BLD QL SMEAR: ABNORMAL
IMM GRANULOCYTES # BLD AUTO: 0.02 K/UL (ref 0–0.04)
IMM GRANULOCYTES NFR BLD AUTO: 0.9 % (ref 0–0.5)
LYMPHOCYTES # BLD AUTO: 0.6 K/UL (ref 1–4.8)
LYMPHOCYTES NFR BLD: 27.4 % (ref 18–48)
MCH RBC QN AUTO: 30.9 PG (ref 27–31)
MCHC RBC AUTO-ENTMCNC: 31.9 G/DL (ref 32–36)
MCV RBC AUTO: 97 FL (ref 82–98)
MONOCYTES # BLD AUTO: 0.1 K/UL (ref 0.3–1)
MONOCYTES NFR BLD: 4.7 % (ref 4–15)
NEUTROPHILS # BLD AUTO: 1.2 K/UL (ref 1.8–7.7)
NEUTROPHILS NFR BLD: 56.8 % (ref 38–73)
NRBC BLD-RTO: 0 /100 WBC
OVALOCYTES BLD QL SMEAR: ABNORMAL
PLATELET # BLD AUTO: 267 K/UL (ref 150–350)
PLATELET BLD QL SMEAR: ABNORMAL
PMV BLD AUTO: 11.2 FL (ref 9.2–12.9)
POIKILOCYTOSIS BLD QL SMEAR: SLIGHT
POLYCHROMASIA BLD QL SMEAR: ABNORMAL
RBC # BLD AUTO: 2.33 M/UL (ref 4–5.4)
WBC # BLD AUTO: 2.15 K/UL (ref 3.9–12.7)

## 2021-02-05 PROCEDURE — 96413 CHEMO IV INFUSION 1 HR: CPT | Mod: HCNC

## 2021-02-05 PROCEDURE — 85025 COMPLETE CBC W/AUTO DIFF WBC: CPT | Mod: HCNC

## 2021-02-05 PROCEDURE — A4216 STERILE WATER/SALINE, 10 ML: HCPCS | Mod: HCNC | Performed by: INTERNAL MEDICINE

## 2021-02-05 PROCEDURE — 63600175 PHARM REV CODE 636 W HCPCS: Mod: HCNC | Performed by: INTERNAL MEDICINE

## 2021-02-05 PROCEDURE — 25000003 PHARM REV CODE 250: Mod: HCNC | Performed by: INTERNAL MEDICINE

## 2021-02-05 RX ORDER — HEPARIN 100 UNIT/ML
500 SYRINGE INTRAVENOUS
Status: DISCONTINUED | OUTPATIENT
Start: 2021-02-05 | End: 2021-02-05 | Stop reason: HOSPADM

## 2021-02-05 RX ORDER — SODIUM CHLORIDE 0.9 % (FLUSH) 0.9 %
10 SYRINGE (ML) INJECTION
Status: DISCONTINUED | OUTPATIENT
Start: 2021-02-05 | End: 2021-02-05 | Stop reason: HOSPADM

## 2021-02-05 RX ORDER — ONDANSETRON 4 MG/1
8 TABLET, ORALLY DISINTEGRATING ORAL ONCE
Status: COMPLETED | OUTPATIENT
Start: 2021-02-05 | End: 2021-02-05

## 2021-02-05 RX ADMIN — ONDANSETRON 8 MG: 4 TABLET, ORALLY DISINTEGRATING ORAL at 02:02

## 2021-02-05 RX ADMIN — HEPARIN 500 UNITS: 100 SYRINGE at 03:02

## 2021-02-05 RX ADMIN — DECITABINE 40 MG: 50 INJECTION, POWDER, LYOPHILIZED, FOR SOLUTION INTRAVENOUS at 02:02

## 2021-02-05 RX ADMIN — Medication 10 ML: at 03:02

## 2021-02-08 ENCOUNTER — PATIENT MESSAGE (OUTPATIENT)
Dept: HEMATOLOGY/ONCOLOGY | Facility: CLINIC | Age: 71
End: 2021-02-08

## 2021-02-08 ENCOUNTER — PATIENT OUTREACH (OUTPATIENT)
Dept: ADMINISTRATIVE | Facility: OTHER | Age: 71
End: 2021-02-08

## 2021-02-08 DIAGNOSIS — E11.22 CONTROLLED TYPE 2 DIABETES MELLITUS WITH STAGE 3 CHRONIC KIDNEY DISEASE, WITHOUT LONG-TERM CURRENT USE OF INSULIN: Primary | ICD-10-CM

## 2021-02-08 DIAGNOSIS — D64.9 ANEMIA REQUIRING TRANSFUSIONS: Primary | ICD-10-CM

## 2021-02-08 DIAGNOSIS — N18.30 CONTROLLED TYPE 2 DIABETES MELLITUS WITH STAGE 3 CHRONIC KIDNEY DISEASE, WITHOUT LONG-TERM CURRENT USE OF INSULIN: Primary | ICD-10-CM

## 2021-02-08 PROCEDURE — 86922 COMPATIBILITY TEST ANTIGLOB: CPT

## 2021-02-08 RX ORDER — ACETAMINOPHEN 325 MG/1
650 TABLET ORAL
Status: CANCELLED | OUTPATIENT
Start: 2021-02-08

## 2021-02-08 RX ORDER — HYDROCODONE BITARTRATE AND ACETAMINOPHEN 500; 5 MG/1; MG/1
TABLET ORAL ONCE
Status: CANCELLED | OUTPATIENT
Start: 2021-02-08 | End: 2021-02-08

## 2021-02-08 RX ORDER — DIPHENHYDRAMINE HCL 25 MG
25 CAPSULE ORAL
Status: CANCELLED | OUTPATIENT
Start: 2021-02-08

## 2021-02-09 ENCOUNTER — INFUSION (OUTPATIENT)
Dept: INFUSION THERAPY | Facility: HOSPITAL | Age: 71
End: 2021-02-09
Attending: INTERNAL MEDICINE
Payer: MEDICARE

## 2021-02-09 VITALS
OXYGEN SATURATION: 97 % | RESPIRATION RATE: 17 BRPM | SYSTOLIC BLOOD PRESSURE: 154 MMHG | TEMPERATURE: 98 F | DIASTOLIC BLOOD PRESSURE: 71 MMHG | HEART RATE: 70 BPM

## 2021-02-09 DIAGNOSIS — D64.9 ANEMIA REQUIRING TRANSFUSIONS: ICD-10-CM

## 2021-02-09 PROCEDURE — 36430 TRANSFUSION BLD/BLD COMPNT: CPT

## 2021-02-09 PROCEDURE — P9040 RBC LEUKOREDUCED IRRADIATED: HCPCS

## 2021-02-09 PROCEDURE — 25000003 PHARM REV CODE 250: Performed by: NURSE PRACTITIONER

## 2021-02-09 RX ORDER — HYDROCODONE BITARTRATE AND ACETAMINOPHEN 500; 5 MG/1; MG/1
TABLET ORAL ONCE
Status: COMPLETED | OUTPATIENT
Start: 2021-02-09 | End: 2021-02-09

## 2021-02-09 RX ORDER — ACETAMINOPHEN 325 MG/1
650 TABLET ORAL
Status: COMPLETED | OUTPATIENT
Start: 2021-02-09 | End: 2021-02-09

## 2021-02-09 RX ORDER — DIPHENHYDRAMINE HCL 25 MG
25 CAPSULE ORAL
Status: COMPLETED | OUTPATIENT
Start: 2021-02-09 | End: 2021-02-09

## 2021-02-09 RX ADMIN — SODIUM CHLORIDE: 0.9 INJECTION, SOLUTION INTRAVENOUS at 09:02

## 2021-02-09 RX ADMIN — ACETAMINOPHEN 650 MG: 325 TABLET ORAL at 09:02

## 2021-02-09 RX ADMIN — DIPHENHYDRAMINE HYDROCHLORIDE 25 MG: 25 CAPSULE ORAL at 09:02

## 2021-02-10 ENCOUNTER — OFFICE VISIT (OUTPATIENT)
Dept: CARDIOLOGY | Facility: CLINIC | Age: 71
End: 2021-02-10
Payer: MEDICARE

## 2021-02-10 VITALS
SYSTOLIC BLOOD PRESSURE: 133 MMHG | HEART RATE: 74 BPM | WEIGHT: 174.19 LBS | HEIGHT: 65 IN | DIASTOLIC BLOOD PRESSURE: 61 MMHG | BODY MASS INDEX: 29.02 KG/M2

## 2021-02-10 DIAGNOSIS — N18.30 CONTROLLED TYPE 2 DIABETES MELLITUS WITH STAGE 3 CHRONIC KIDNEY DISEASE, WITHOUT LONG-TERM CURRENT USE OF INSULIN: ICD-10-CM

## 2021-02-10 DIAGNOSIS — I77.9 BILATERAL CAROTID ARTERY DISEASE, UNSPECIFIED TYPE: ICD-10-CM

## 2021-02-10 DIAGNOSIS — Z98.890 HISTORY OF CAROTID ENDARTERECTOMY: ICD-10-CM

## 2021-02-10 DIAGNOSIS — N18.31 STAGE 3A CHRONIC KIDNEY DISEASE: ICD-10-CM

## 2021-02-10 DIAGNOSIS — D46.9 MYELODYSPLASTIC SYNDROME: ICD-10-CM

## 2021-02-10 DIAGNOSIS — I10 ESSENTIAL HYPERTENSION: ICD-10-CM

## 2021-02-10 DIAGNOSIS — I25.10 CORONARY ARTERY DISEASE INVOLVING NATIVE CORONARY ARTERY OF NATIVE HEART WITHOUT ANGINA PECTORIS: Primary | ICD-10-CM

## 2021-02-10 DIAGNOSIS — D64.9 ANEMIA REQUIRING TRANSFUSIONS: ICD-10-CM

## 2021-02-10 DIAGNOSIS — E11.22 CONTROLLED TYPE 2 DIABETES MELLITUS WITH STAGE 3 CHRONIC KIDNEY DISEASE, WITHOUT LONG-TERM CURRENT USE OF INSULIN: ICD-10-CM

## 2021-02-10 PROBLEM — E83.42 HYPOMAGNESEMIA: Status: RESOLVED | Noted: 2019-03-17 | Resolved: 2021-02-10

## 2021-02-10 PROCEDURE — 3075F SYST BP GE 130 - 139MM HG: CPT | Mod: CPTII,S$GLB,, | Performed by: PHYSICIAN ASSISTANT

## 2021-02-10 PROCEDURE — 3075F PR MOST RECENT SYSTOLIC BLOOD PRESS GE 130-139MM HG: ICD-10-PCS | Mod: CPTII,S$GLB,, | Performed by: PHYSICIAN ASSISTANT

## 2021-02-10 PROCEDURE — 3078F PR MOST RECENT DIASTOLIC BLOOD PRESSURE < 80 MM HG: ICD-10-PCS | Mod: CPTII,S$GLB,, | Performed by: PHYSICIAN ASSISTANT

## 2021-02-10 PROCEDURE — 3008F BODY MASS INDEX DOCD: CPT | Mod: CPTII,S$GLB,, | Performed by: PHYSICIAN ASSISTANT

## 2021-02-10 PROCEDURE — 1157F ADVNC CARE PLAN IN RCRD: CPT | Mod: S$GLB,,, | Performed by: PHYSICIAN ASSISTANT

## 2021-02-10 PROCEDURE — 99499 RISK ADDL DX/OHS AUDIT: ICD-10-PCS | Mod: S$PBB,,, | Performed by: PHYSICIAN ASSISTANT

## 2021-02-10 PROCEDURE — 1159F MED LIST DOCD IN RCRD: CPT | Mod: S$GLB,,, | Performed by: PHYSICIAN ASSISTANT

## 2021-02-10 PROCEDURE — 1159F PR MEDICATION LIST DOCUMENTED IN MEDICAL RECORD: ICD-10-PCS | Mod: S$GLB,,, | Performed by: PHYSICIAN ASSISTANT

## 2021-02-10 PROCEDURE — 99999 PR PBB SHADOW E&M-EST. PATIENT-LVL V: ICD-10-PCS | Mod: PBBFAC,,, | Performed by: PHYSICIAN ASSISTANT

## 2021-02-10 PROCEDURE — 99214 PR OFFICE/OUTPT VISIT, EST, LEVL IV, 30-39 MIN: ICD-10-PCS | Mod: S$GLB,,, | Performed by: PHYSICIAN ASSISTANT

## 2021-02-10 PROCEDURE — 99499 UNLISTED E&M SERVICE: CPT | Mod: S$PBB,,, | Performed by: PHYSICIAN ASSISTANT

## 2021-02-10 PROCEDURE — 1157F PR ADVANCE CARE PLAN OR EQUIV PRESENT IN MEDICAL RECORD: ICD-10-PCS | Mod: S$GLB,,, | Performed by: PHYSICIAN ASSISTANT

## 2021-02-10 PROCEDURE — 1125F PR PAIN SEVERITY QUANTIFIED, PAIN PRESENT: ICD-10-PCS | Mod: S$GLB,,, | Performed by: PHYSICIAN ASSISTANT

## 2021-02-10 PROCEDURE — 1125F AMNT PAIN NOTED PAIN PRSNT: CPT | Mod: S$GLB,,, | Performed by: PHYSICIAN ASSISTANT

## 2021-02-10 PROCEDURE — 99214 OFFICE O/P EST MOD 30 MIN: CPT | Mod: S$GLB,,, | Performed by: PHYSICIAN ASSISTANT

## 2021-02-10 PROCEDURE — 3044F PR MOST RECENT HEMOGLOBIN A1C LEVEL <7.0%: ICD-10-PCS | Mod: CPTII,S$GLB,, | Performed by: PHYSICIAN ASSISTANT

## 2021-02-10 PROCEDURE — 99999 PR PBB SHADOW E&M-EST. PATIENT-LVL V: CPT | Mod: PBBFAC,,, | Performed by: PHYSICIAN ASSISTANT

## 2021-02-10 PROCEDURE — 3044F HG A1C LEVEL LT 7.0%: CPT | Mod: CPTII,S$GLB,, | Performed by: PHYSICIAN ASSISTANT

## 2021-02-10 PROCEDURE — 3008F PR BODY MASS INDEX (BMI) DOCUMENTED: ICD-10-PCS | Mod: CPTII,S$GLB,, | Performed by: PHYSICIAN ASSISTANT

## 2021-02-10 PROCEDURE — 3078F DIAST BP <80 MM HG: CPT | Mod: CPTII,S$GLB,, | Performed by: PHYSICIAN ASSISTANT

## 2021-02-10 RX ORDER — MINERAL OIL
180 ENEMA (ML) RECTAL DAILY
Status: ON HOLD | COMMUNITY
End: 2022-01-01 | Stop reason: HOSPADM

## 2021-02-10 RX ORDER — CARVEDILOL 12.5 MG/1
12.5 TABLET ORAL 2 TIMES DAILY WITH MEALS
Qty: 60 TABLET | Refills: 11 | Status: SHIPPED | OUTPATIENT
Start: 2021-02-10 | End: 2022-01-01 | Stop reason: SDUPTHER

## 2021-02-17 ENCOUNTER — PATIENT MESSAGE (OUTPATIENT)
Dept: HEMATOLOGY/ONCOLOGY | Facility: CLINIC | Age: 71
End: 2021-02-17

## 2021-02-17 ENCOUNTER — INFUSION (OUTPATIENT)
Dept: INFUSION THERAPY | Facility: HOSPITAL | Age: 71
End: 2021-02-17
Attending: INTERNAL MEDICINE
Payer: MEDICARE

## 2021-02-17 VITALS
HEART RATE: 76 BPM | RESPIRATION RATE: 18 BRPM | TEMPERATURE: 98 F | DIASTOLIC BLOOD PRESSURE: 68 MMHG | SYSTOLIC BLOOD PRESSURE: 149 MMHG

## 2021-02-17 DIAGNOSIS — D46.9 MDS (MYELODYSPLASTIC SYNDROME): ICD-10-CM

## 2021-02-17 DIAGNOSIS — D64.9 ANEMIA REQUIRING TRANSFUSIONS: Primary | ICD-10-CM

## 2021-02-17 DIAGNOSIS — D46.C MDS (MYELODYSPLASTIC SYNDROME) WITH 5Q DELETION: ICD-10-CM

## 2021-02-17 PROCEDURE — P9040 RBC LEUKOREDUCED IRRADIATED: HCPCS

## 2021-02-17 PROCEDURE — A4216 STERILE WATER/SALINE, 10 ML: HCPCS | Performed by: INTERNAL MEDICINE

## 2021-02-17 PROCEDURE — 63600175 PHARM REV CODE 636 W HCPCS: Performed by: INTERNAL MEDICINE

## 2021-02-17 PROCEDURE — 86922 COMPATIBILITY TEST ANTIGLOB: CPT

## 2021-02-17 PROCEDURE — 86902 BLOOD TYPE ANTIGEN DONOR EA: CPT | Mod: 59

## 2021-02-17 PROCEDURE — 25000003 PHARM REV CODE 250: Performed by: INTERNAL MEDICINE

## 2021-02-17 PROCEDURE — 36430 TRANSFUSION BLD/BLD COMPNT: CPT

## 2021-02-17 RX ORDER — SODIUM CHLORIDE 0.9 % (FLUSH) 0.9 %
10 SYRINGE (ML) INJECTION
Status: COMPLETED | OUTPATIENT
Start: 2021-02-17 | End: 2021-02-17

## 2021-02-17 RX ORDER — HYDROCODONE BITARTRATE AND ACETAMINOPHEN 500; 5 MG/1; MG/1
TABLET ORAL ONCE
Status: CANCELLED | OUTPATIENT
Start: 2021-02-17 | End: 2021-02-17

## 2021-02-17 RX ORDER — HEPARIN 100 UNIT/ML
500 SYRINGE INTRAVENOUS
Status: CANCELLED | OUTPATIENT
Start: 2021-02-17

## 2021-02-17 RX ORDER — ACETAMINOPHEN 325 MG/1
650 TABLET ORAL
Status: CANCELLED | OUTPATIENT
Start: 2021-02-17

## 2021-02-17 RX ORDER — HEPARIN 100 UNIT/ML
500 SYRINGE INTRAVENOUS
Status: COMPLETED | OUTPATIENT
Start: 2021-02-17 | End: 2021-02-17

## 2021-02-17 RX ORDER — DIPHENHYDRAMINE HCL 25 MG
25 CAPSULE ORAL
Status: CANCELLED | OUTPATIENT
Start: 2021-02-17

## 2021-02-17 RX ORDER — HYDROCODONE BITARTRATE AND ACETAMINOPHEN 500; 5 MG/1; MG/1
TABLET ORAL ONCE
Status: COMPLETED | OUTPATIENT
Start: 2021-02-17 | End: 2021-02-17

## 2021-02-17 RX ORDER — DIPHENHYDRAMINE HCL 25 MG
25 CAPSULE ORAL
Status: COMPLETED | OUTPATIENT
Start: 2021-02-17 | End: 2021-02-17

## 2021-02-17 RX ORDER — ACETAMINOPHEN 325 MG/1
650 TABLET ORAL
Status: COMPLETED | OUTPATIENT
Start: 2021-02-17 | End: 2021-02-17

## 2021-02-17 RX ORDER — SODIUM CHLORIDE 0.9 % (FLUSH) 0.9 %
10 SYRINGE (ML) INJECTION
Status: CANCELLED | OUTPATIENT
Start: 2021-02-17

## 2021-02-17 RX ADMIN — ACETAMINOPHEN 650 MG: 325 TABLET ORAL at 02:02

## 2021-02-17 RX ADMIN — HEPARIN 500 UNITS: 100 SYRINGE at 04:02

## 2021-02-17 RX ADMIN — SODIUM CHLORIDE: 0.9 INJECTION, SOLUTION INTRAVENOUS at 02:02

## 2021-02-17 RX ADMIN — DIPHENHYDRAMINE HYDROCHLORIDE 25 MG: 25 CAPSULE ORAL at 02:02

## 2021-02-17 RX ADMIN — Medication 10 ML: at 04:02

## 2021-02-19 RX ORDER — EVOLOCUMAB 140 MG/ML
INJECTION, SOLUTION SUBCUTANEOUS
Qty: 6 ML | Refills: 3 | Status: SHIPPED | OUTPATIENT
Start: 2021-02-19 | End: 2022-01-01 | Stop reason: SDUPTHER

## 2021-02-22 ENCOUNTER — PATIENT MESSAGE (OUTPATIENT)
Dept: HEMATOLOGY/ONCOLOGY | Facility: CLINIC | Age: 71
End: 2021-02-22

## 2021-02-22 DIAGNOSIS — M54.32 BILATERAL SCIATICA: ICD-10-CM

## 2021-02-22 DIAGNOSIS — M54.31 BILATERAL SCIATICA: ICD-10-CM

## 2021-02-23 ENCOUNTER — TELEPHONE (OUTPATIENT)
Dept: CARDIOLOGY | Facility: CLINIC | Age: 71
End: 2021-02-23

## 2021-02-23 ENCOUNTER — PATIENT MESSAGE (OUTPATIENT)
Dept: HEMATOLOGY/ONCOLOGY | Facility: CLINIC | Age: 71
End: 2021-02-23

## 2021-02-23 RX ORDER — GABAPENTIN 100 MG/1
100 CAPSULE ORAL DAILY
Qty: 60 CAPSULE | Refills: 2 | OUTPATIENT
Start: 2021-02-23 | End: 2022-01-01

## 2021-02-24 ENCOUNTER — PATIENT MESSAGE (OUTPATIENT)
Dept: PHARMACY | Facility: CLINIC | Age: 71
End: 2021-02-24

## 2021-02-24 ENCOUNTER — SPECIALTY PHARMACY (OUTPATIENT)
Dept: PHARMACY | Facility: CLINIC | Age: 71
End: 2021-02-24

## 2021-02-25 ENCOUNTER — HOSPITAL ENCOUNTER (OUTPATIENT)
Dept: CARDIOLOGY | Facility: HOSPITAL | Age: 71
Discharge: HOME | End: 2021-02-25
Attending: PHYSICIAN ASSISTANT
Payer: MEDICARE

## 2021-02-25 ENCOUNTER — PATIENT MESSAGE (OUTPATIENT)
Dept: HEMATOLOGY/ONCOLOGY | Facility: CLINIC | Age: 71
End: 2021-02-25

## 2021-02-25 VITALS — WEIGHT: 174 LBS | BODY MASS INDEX: 28.99 KG/M2 | HEIGHT: 65 IN

## 2021-02-25 DIAGNOSIS — I77.9 BILATERAL CAROTID ARTERY DISEASE, UNSPECIFIED TYPE: ICD-10-CM

## 2021-02-25 DIAGNOSIS — I25.10 CORONARY ARTERY DISEASE INVOLVING NATIVE CORONARY ARTERY OF NATIVE HEART WITHOUT ANGINA PECTORIS: ICD-10-CM

## 2021-02-25 DIAGNOSIS — I65.22 CAROTID ARTERY STENOSIS, ASYMPTOMATIC, LEFT: ICD-10-CM

## 2021-02-25 DIAGNOSIS — D64.9 ANEMIA REQUIRING TRANSFUSIONS: Primary | ICD-10-CM

## 2021-02-25 DIAGNOSIS — D46.C MDS (MYELODYSPLASTIC SYNDROME) WITH 5Q DELETION: ICD-10-CM

## 2021-02-25 LAB
CFR FLOW - ANTERIOR: 2.15
CFR FLOW - INFERIOR: 1.21
CFR FLOW - LATERAL: 1.51
CFR FLOW - MAX: 2.95
CFR FLOW - MIN: 0.97
CFR FLOW - SEPTAL: 1.52
CFR FLOW - WHOLE HEART: 1.6
CV PHARM DOSE: 44.3 MG
CV STRESS BASE HR: 78 BPM
DIASTOLIC BLOOD PRESSURE: 71 MMHG
END DIASTOLIC INDEX-HIGH: 170 ML/M2
END SYSTOLIC INDEX-HIGH: 70 ML/M2
LEFT ARM DIASTOLIC BLOOD PRESSURE: 57 MMHG
LEFT ARM SYSTOLIC BLOOD PRESSURE: 118 MMHG
LEFT CBA DIAS: 54 CM/S
LEFT CBA SYS: 317 CM/S
LEFT CCA DIST DIAS: 55 CM/S
LEFT CCA DIST SYS: 303 CM/S
LEFT CCA MID DIAS: 32 CM/S
LEFT CCA MID SYS: 140 CM/S
LEFT CCA PROX DIAS: 24 CM/S
LEFT CCA PROX SYS: 118 CM/S
LEFT ECA DIAS: 17 CM/S
LEFT ECA SYS: 196 CM/S
LEFT ICA DIST DIAS: 42 CM/S
LEFT ICA DIST SYS: 114 CM/S
LEFT ICA MID DIAS: 38 CM/S
LEFT ICA MID SYS: 140 CM/S
LEFT ICA PROX DIAS: 36 CM/S
LEFT ICA PROX SYS: 137 CM/S
LEFT VERTEBRAL DIAS: 7 CM/S
LEFT VERTEBRAL SYS: 47 CM/S
NUC REST DIASTOLIC VOLUME INDEX: 96
NUC REST EJECTION FRACTION: 71
NUC REST SYSTOLIC VOLUME INDEX: 28
NUC STRESS DIASTOLIC VOLUME INDEX: 115
NUC STRESS EJECTION FRACTION: 73 %
NUC STRESS SYSTOLIC VOLUME INDEX: 31
OHS CV CAROTID RIGHT ICA EDV HIGHEST: 98
OHS CV CAROTID ULTRASOUND LEFT ICA/CCA RATIO: 0.46
OHS CV CAROTID ULTRASOUND RIGHT ICA/CCA RATIO: 4.34
OHS CV CPX 85 PERCENT MAX PREDICTED HEART RATE MALE: 123
OHS CV CPX MAX PREDICTED HEART RATE: 144
OHS CV CPX PATIENT IS FEMALE: 1
OHS CV CPX PATIENT IS MALE: 0
OHS CV CPX PEAK DIASTOLIC BLOOD PRESSURE: 53 MMHG
OHS CV CPX PEAK HEAR RATE: 81 BPM
OHS CV CPX PEAK RATE PRESSURE PRODUCT: 5589
OHS CV CPX PEAK SYSTOLIC BLOOD PRESSURE: 69 MMHG
OHS CV CPX PERCENT MAX PREDICTED HEART RATE ACHIEVED: 56
OHS CV CPX RATE PRESSURE PRODUCT PRESENTING: NORMAL
OHS CV PV CAROTID LEFT HIGHEST CCA: 303
OHS CV PV CAROTID LEFT HIGHEST ICA: 140
OHS CV PV CAROTID RIGHT HIGHEST CCA: 95
OHS CV PV CAROTID RIGHT HIGHEST ICA: 369
OHS CV US CAROTID LEFT HIGHEST EDV: 42
PERFUSION DEFECT 1 SIZE IN %: 8 %
REST FLOW - ANTERIOR: 0.74 CC/MIN/G
REST FLOW - INFERIOR: 1.24 CC/MIN/G
REST FLOW - LATERAL: 1.25 CC/MIN/G
REST FLOW - MAX: 1.61 CC/MIN/G
REST FLOW - MIN: 0.46 CC/MIN/G
REST FLOW - SEPTAL: 0.88 CC/MIN/G
REST FLOW - WHOLE HEART: 1.03 CC/MIN/G
RETIRED EF AND QEF - SEE NOTES: 51 %
RIGHT ARM DIASTOLIC BLOOD PRESSURE: 52 MMHG
RIGHT ARM SYSTOLIC BLOOD PRESSURE: 103 MMHG
RIGHT CBA DIAS: 24 CM/S
RIGHT CBA SYS: 92 CM/S
RIGHT CCA DIST DIAS: 26 CM/S
RIGHT CCA DIST SYS: 85 CM/S
RIGHT CCA MID DIAS: 17 CM/S
RIGHT CCA MID SYS: 85 CM/S
RIGHT CCA PROX DIAS: 17 CM/S
RIGHT CCA PROX SYS: 95 CM/S
RIGHT ECA DIAS: 7 CM/S
RIGHT ECA SYS: 142 CM/S
RIGHT ICA DIST DIAS: 41 CM/S
RIGHT ICA DIST SYS: 129 CM/S
RIGHT ICA MID DIAS: 44 CM/S
RIGHT ICA MID SYS: 123 CM/S
RIGHT ICA PROX DIAS: 98 CM/S
RIGHT ICA PROX SYS: 369 CM/S
RIGHT VERTEBRAL DIAS: 26 CM/S
RIGHT VERTEBRAL SYS: 78 CM/S
STRESS FLOW - ANTERIOR: 1.5 CC/MIN/G
STRESS FLOW - INFERIOR: 1.47 CC/MIN/G
STRESS FLOW - LATERAL: 1.82 CC/MIN/G
STRESS FLOW - MAX: 2.5 CC/MIN/G
STRESS FLOW - MIN: 1 CC/MIN/G
STRESS FLOW - SEPTAL: 1.31 CC/MIN/G
STRESS FLOW - WHOLE HEART: 1.53 CC/MIN/G
SYSTOLIC BLOOD PRESSURE: 162 MMHG

## 2021-02-25 PROCEDURE — 93880 EXTRACRANIAL BILAT STUDY: CPT

## 2021-02-25 PROCEDURE — 78434 AQMBF PET REST & RX STRESS: CPT | Mod: 26,,, | Performed by: INTERNAL MEDICINE

## 2021-02-25 PROCEDURE — 93016 CARDIAC PET SCAN STRESS (CUPID ONLY): ICD-10-PCS | Mod: ,,, | Performed by: INTERNAL MEDICINE

## 2021-02-25 PROCEDURE — 93880 CV US DOPPLER CAROTID (CUPID ONLY): ICD-10-PCS | Mod: 26,,, | Performed by: INTERNAL MEDICINE

## 2021-02-25 PROCEDURE — 78492 CARDIAC PET SCAN STRESS (CUPID ONLY): ICD-10-PCS | Mod: 26,,, | Performed by: INTERNAL MEDICINE

## 2021-02-25 PROCEDURE — 63600175 PHARM REV CODE 636 W HCPCS: Performed by: PHYSICIAN ASSISTANT

## 2021-02-25 PROCEDURE — 93880 EXTRACRANIAL BILAT STUDY: CPT | Mod: 26,,, | Performed by: INTERNAL MEDICINE

## 2021-02-25 PROCEDURE — 86922 COMPATIBILITY TEST ANTIGLOB: CPT

## 2021-02-25 PROCEDURE — 93016 CV STRESS TEST SUPVJ ONLY: CPT | Mod: ,,, | Performed by: INTERNAL MEDICINE

## 2021-02-25 PROCEDURE — 78434 AQMBF PET REST & RX STRESS: CPT

## 2021-02-25 PROCEDURE — 93018 CARDIAC PET SCAN STRESS (CUPID ONLY): ICD-10-PCS | Mod: ,,, | Performed by: INTERNAL MEDICINE

## 2021-02-25 PROCEDURE — 78434 CARDIAC PET SCAN STRESS (CUPID ONLY): ICD-10-PCS | Mod: 26,,, | Performed by: INTERNAL MEDICINE

## 2021-02-25 PROCEDURE — 93018 CV STRESS TEST I&R ONLY: CPT | Mod: ,,, | Performed by: INTERNAL MEDICINE

## 2021-02-25 PROCEDURE — 78492 MYOCRD IMG PET MLT RST&STRS: CPT | Mod: 26,,, | Performed by: INTERNAL MEDICINE

## 2021-02-25 RX ORDER — HYDROCODONE BITARTRATE AND ACETAMINOPHEN 500; 5 MG/1; MG/1
TABLET ORAL ONCE
Status: CANCELLED | OUTPATIENT
Start: 2021-02-25 | End: 2021-02-25

## 2021-02-25 RX ORDER — DIPHENHYDRAMINE HCL 25 MG
25 CAPSULE ORAL
Status: CANCELLED | OUTPATIENT
Start: 2021-02-25

## 2021-02-25 RX ORDER — ACETAMINOPHEN 325 MG/1
650 TABLET ORAL
Status: CANCELLED | OUTPATIENT
Start: 2021-02-25

## 2021-02-25 RX ORDER — DIPYRIDAMOLE 5 MG/ML
44.29 INJECTION INTRAVENOUS ONCE
Status: COMPLETED | OUTPATIENT
Start: 2021-02-25 | End: 2021-02-25

## 2021-02-25 RX ADMIN — DIPYRIDAMOLE 44.3 MG: 5 INJECTION INTRAVENOUS at 09:02

## 2021-02-26 ENCOUNTER — INFUSION (OUTPATIENT)
Dept: INFUSION THERAPY | Facility: HOSPITAL | Age: 71
End: 2021-02-26
Attending: NURSE PRACTITIONER
Payer: MEDICARE

## 2021-02-26 ENCOUNTER — TELEPHONE (OUTPATIENT)
Dept: CARDIOLOGY | Facility: CLINIC | Age: 71
End: 2021-02-26

## 2021-02-26 VITALS
TEMPERATURE: 99 F | SYSTOLIC BLOOD PRESSURE: 104 MMHG | RESPIRATION RATE: 18 BRPM | HEART RATE: 65 BPM | OXYGEN SATURATION: 97 % | DIASTOLIC BLOOD PRESSURE: 49 MMHG

## 2021-02-26 DIAGNOSIS — I65.21 CAROTID STENOSIS, ASYMPTOMATIC, RIGHT: Primary | ICD-10-CM

## 2021-02-26 DIAGNOSIS — D64.9 ANEMIA REQUIRING TRANSFUSIONS: Primary | ICD-10-CM

## 2021-02-26 DIAGNOSIS — F32.A DEPRESSION, UNSPECIFIED DEPRESSION TYPE: ICD-10-CM

## 2021-02-26 DIAGNOSIS — D46.C MDS (MYELODYSPLASTIC SYNDROME) WITH 5Q DELETION: ICD-10-CM

## 2021-02-26 DIAGNOSIS — D46.9 MDS (MYELODYSPLASTIC SYNDROME): ICD-10-CM

## 2021-02-26 PROCEDURE — P9040 RBC LEUKOREDUCED IRRADIATED: HCPCS

## 2021-02-26 PROCEDURE — A4216 STERILE WATER/SALINE, 10 ML: HCPCS | Performed by: INTERNAL MEDICINE

## 2021-02-26 PROCEDURE — 63600175 PHARM REV CODE 636 W HCPCS: Performed by: INTERNAL MEDICINE

## 2021-02-26 PROCEDURE — 25000003 PHARM REV CODE 250: Performed by: INTERNAL MEDICINE

## 2021-02-26 PROCEDURE — 25000003 PHARM REV CODE 250: Performed by: NURSE PRACTITIONER

## 2021-02-26 PROCEDURE — 36430 TRANSFUSION BLD/BLD COMPNT: CPT

## 2021-02-26 RX ORDER — ACETAMINOPHEN 325 MG/1
650 TABLET ORAL
Status: COMPLETED | OUTPATIENT
Start: 2021-02-26 | End: 2021-02-26

## 2021-02-26 RX ORDER — SODIUM CHLORIDE 0.9 % (FLUSH) 0.9 %
10 SYRINGE (ML) INJECTION
Status: CANCELLED | OUTPATIENT
Start: 2021-02-26

## 2021-02-26 RX ORDER — DIPHENHYDRAMINE HCL 25 MG
25 CAPSULE ORAL
Status: COMPLETED | OUTPATIENT
Start: 2021-02-26 | End: 2021-02-26

## 2021-02-26 RX ORDER — HYDROCODONE BITARTRATE AND ACETAMINOPHEN 500; 5 MG/1; MG/1
TABLET ORAL ONCE
Status: COMPLETED | OUTPATIENT
Start: 2021-02-26 | End: 2021-02-26

## 2021-02-26 RX ORDER — CITALOPRAM 20 MG/1
20 TABLET, FILM COATED ORAL DAILY
Qty: 30 TABLET | Refills: 2 | Status: SHIPPED | OUTPATIENT
Start: 2021-02-26 | End: 2021-01-01 | Stop reason: SDUPTHER

## 2021-02-26 RX ORDER — HEPARIN 100 UNIT/ML
500 SYRINGE INTRAVENOUS
Status: COMPLETED | OUTPATIENT
Start: 2021-02-26 | End: 2021-02-26

## 2021-02-26 RX ORDER — SODIUM CHLORIDE 0.9 % (FLUSH) 0.9 %
10 SYRINGE (ML) INJECTION
Status: COMPLETED | OUTPATIENT
Start: 2021-02-26 | End: 2021-02-26

## 2021-02-26 RX ORDER — HEPARIN 100 UNIT/ML
500 SYRINGE INTRAVENOUS
Status: CANCELLED | OUTPATIENT
Start: 2021-02-26

## 2021-02-26 RX ADMIN — Medication 10 ML: at 01:02

## 2021-02-26 RX ADMIN — SODIUM CHLORIDE: 0.9 INJECTION, SOLUTION INTRAVENOUS at 10:02

## 2021-02-26 RX ADMIN — DIPHENHYDRAMINE HYDROCHLORIDE 25 MG: 25 CAPSULE ORAL at 10:02

## 2021-02-26 RX ADMIN — HEPARIN 500 UNITS: 100 SYRINGE at 01:02

## 2021-02-26 RX ADMIN — ACETAMINOPHEN 650 MG: 325 TABLET ORAL at 10:02

## 2021-02-27 ENCOUNTER — PATIENT MESSAGE (OUTPATIENT)
Dept: HEMATOLOGY/ONCOLOGY | Facility: CLINIC | Age: 71
End: 2021-02-27

## 2021-02-27 DIAGNOSIS — D46.C MDS (MYELODYSPLASTIC SYNDROME) WITH 5Q DELETION: ICD-10-CM

## 2021-03-01 ENCOUNTER — OFFICE VISIT (OUTPATIENT)
Dept: HEMATOLOGY/ONCOLOGY | Facility: CLINIC | Age: 71
End: 2021-03-01
Payer: MEDICARE

## 2021-03-01 ENCOUNTER — LAB VISIT (OUTPATIENT)
Dept: LAB | Facility: HOSPITAL | Age: 71
End: 2021-03-01
Attending: INTERNAL MEDICINE
Payer: MEDICARE

## 2021-03-01 ENCOUNTER — TELEPHONE (OUTPATIENT)
Dept: CARDIOLOGY | Facility: CLINIC | Age: 71
End: 2021-03-01

## 2021-03-01 ENCOUNTER — PATIENT MESSAGE (OUTPATIENT)
Dept: HEMATOLOGY/ONCOLOGY | Facility: CLINIC | Age: 71
End: 2021-03-01

## 2021-03-01 VITALS
HEIGHT: 65 IN | BODY MASS INDEX: 28.67 KG/M2 | HEART RATE: 62 BPM | SYSTOLIC BLOOD PRESSURE: 136 MMHG | TEMPERATURE: 98 F | OXYGEN SATURATION: 97 % | RESPIRATION RATE: 18 BRPM | DIASTOLIC BLOOD PRESSURE: 63 MMHG | WEIGHT: 172.06 LBS

## 2021-03-01 DIAGNOSIS — K12.31 MUCOSITIS DUE TO ANTINEOPLASTIC THERAPY: ICD-10-CM

## 2021-03-01 DIAGNOSIS — G47.00 INSOMNIA, UNSPECIFIED TYPE: ICD-10-CM

## 2021-03-01 DIAGNOSIS — M79.10 MYALGIA: ICD-10-CM

## 2021-03-01 DIAGNOSIS — E11.9 TYPE 2 DIABETES MELLITUS WITHOUT COMPLICATION, UNSPECIFIED WHETHER LONG TERM INSULIN USE: ICD-10-CM

## 2021-03-01 DIAGNOSIS — D75.9 CYTOPENIA: ICD-10-CM

## 2021-03-01 DIAGNOSIS — R39.9 UTI SYMPTOMS: ICD-10-CM

## 2021-03-01 DIAGNOSIS — M54.31 BILATERAL SCIATICA: ICD-10-CM

## 2021-03-01 DIAGNOSIS — I10 ESSENTIAL HYPERTENSION: ICD-10-CM

## 2021-03-01 DIAGNOSIS — D46.C MDS (MYELODYSPLASTIC SYNDROME) WITH 5Q DELETION: ICD-10-CM

## 2021-03-01 DIAGNOSIS — F43.23 ADJUSTMENT DISORDER WITH MIXED ANXIETY AND DEPRESSED MOOD: ICD-10-CM

## 2021-03-01 DIAGNOSIS — M54.32 BILATERAL SCIATICA: ICD-10-CM

## 2021-03-01 DIAGNOSIS — D64.9 ANEMIA REQUIRING TRANSFUSIONS: ICD-10-CM

## 2021-03-01 DIAGNOSIS — D46.C MDS (MYELODYSPLASTIC SYNDROME) WITH 5Q DELETION: Primary | ICD-10-CM

## 2021-03-01 LAB
ABO + RH BLD: NORMAL
BASOPHILS # BLD AUTO: 0 K/UL (ref 0–0.2)
BASOPHILS NFR BLD: 0 % (ref 0–1.9)
BLD GP AB SCN CELLS X3 SERPL QL: NORMAL
DIFFERENTIAL METHOD: ABNORMAL
EOSINOPHIL # BLD AUTO: 0 K/UL (ref 0–0.5)
EOSINOPHIL NFR BLD: 6.1 % (ref 0–8)
ERYTHROCYTE [DISTWIDTH] IN BLOOD BY AUTOMATED COUNT: 17.1 % (ref 11.5–14.5)
HCT VFR BLD AUTO: 21.4 % (ref 37–48.5)
HGB BLD-MCNC: 7 G/DL (ref 12–16)
IMM GRANULOCYTES # BLD AUTO: 0 K/UL (ref 0–0.04)
IMM GRANULOCYTES NFR BLD AUTO: 0 % (ref 0–0.5)
LYMPHOCYTES # BLD AUTO: 0.4 K/UL (ref 1–4.8)
LYMPHOCYTES NFR BLD: 71.4 % (ref 18–48)
MCH RBC QN AUTO: 29.8 PG (ref 27–31)
MCHC RBC AUTO-ENTMCNC: 32.7 G/DL (ref 32–36)
MCV RBC AUTO: 91 FL (ref 82–98)
MONOCYTES # BLD AUTO: 0 K/UL (ref 0.3–1)
MONOCYTES NFR BLD: 6.1 % (ref 4–15)
NEUTROPHILS # BLD AUTO: 0.1 K/UL (ref 1.8–7.7)
NEUTROPHILS NFR BLD: 16.4 % (ref 38–73)
NRBC BLD-RTO: 0 /100 WBC
PLATELET # BLD AUTO: 245 K/UL (ref 150–350)
PMV BLD AUTO: 11.1 FL (ref 9.2–12.9)
RBC # BLD AUTO: 2.35 M/UL (ref 4–5.4)
WBC # BLD AUTO: 0.49 K/UL (ref 3.9–12.7)

## 2021-03-01 PROCEDURE — 99999 PR PBB SHADOW E&M-EST. PATIENT-LVL V: ICD-10-PCS | Mod: PBBFAC,,, | Performed by: NURSE PRACTITIONER

## 2021-03-01 PROCEDURE — 1159F MED LIST DOCD IN RCRD: CPT | Mod: S$GLB,,, | Performed by: NURSE PRACTITIONER

## 2021-03-01 PROCEDURE — 3078F DIAST BP <80 MM HG: CPT | Mod: CPTII,S$GLB,, | Performed by: NURSE PRACTITIONER

## 2021-03-01 PROCEDURE — 1159F PR MEDICATION LIST DOCUMENTED IN MEDICAL RECORD: ICD-10-PCS | Mod: S$GLB,,, | Performed by: NURSE PRACTITIONER

## 2021-03-01 PROCEDURE — 85025 COMPLETE CBC W/AUTO DIFF WBC: CPT

## 2021-03-01 PROCEDURE — 1157F PR ADVANCE CARE PLAN OR EQUIV PRESENT IN MEDICAL RECORD: ICD-10-PCS | Mod: S$GLB,,, | Performed by: NURSE PRACTITIONER

## 2021-03-01 PROCEDURE — 1101F PR PT FALLS ASSESS DOC 0-1 FALLS W/OUT INJ PAST YR: ICD-10-PCS | Mod: CPTII,S$GLB,, | Performed by: NURSE PRACTITIONER

## 2021-03-01 PROCEDURE — 1157F ADVNC CARE PLAN IN RCRD: CPT | Mod: S$GLB,,, | Performed by: NURSE PRACTITIONER

## 2021-03-01 PROCEDURE — 99215 OFFICE O/P EST HI 40 MIN: CPT | Mod: S$GLB,,, | Performed by: NURSE PRACTITIONER

## 2021-03-01 PROCEDURE — 99999 PR PBB SHADOW E&M-EST. PATIENT-LVL V: CPT | Mod: PBBFAC,,, | Performed by: NURSE PRACTITIONER

## 2021-03-01 PROCEDURE — 3075F SYST BP GE 130 - 139MM HG: CPT | Mod: CPTII,S$GLB,, | Performed by: NURSE PRACTITIONER

## 2021-03-01 PROCEDURE — 3008F PR BODY MASS INDEX (BMI) DOCUMENTED: ICD-10-PCS | Mod: CPTII,S$GLB,, | Performed by: NURSE PRACTITIONER

## 2021-03-01 PROCEDURE — 3075F PR MOST RECENT SYSTOLIC BLOOD PRESS GE 130-139MM HG: ICD-10-PCS | Mod: CPTII,S$GLB,, | Performed by: NURSE PRACTITIONER

## 2021-03-01 PROCEDURE — 3008F BODY MASS INDEX DOCD: CPT | Mod: CPTII,S$GLB,, | Performed by: NURSE PRACTITIONER

## 2021-03-01 PROCEDURE — 3078F PR MOST RECENT DIASTOLIC BLOOD PRESSURE < 80 MM HG: ICD-10-PCS | Mod: CPTII,S$GLB,, | Performed by: NURSE PRACTITIONER

## 2021-03-01 PROCEDURE — 1101F PT FALLS ASSESS-DOCD LE1/YR: CPT | Mod: CPTII,S$GLB,, | Performed by: NURSE PRACTITIONER

## 2021-03-01 PROCEDURE — 3044F PR MOST RECENT HEMOGLOBIN A1C LEVEL <7.0%: ICD-10-PCS | Mod: CPTII,S$GLB,, | Performed by: NURSE PRACTITIONER

## 2021-03-01 PROCEDURE — 1126F AMNT PAIN NOTED NONE PRSNT: CPT | Mod: S$GLB,,, | Performed by: NURSE PRACTITIONER

## 2021-03-01 PROCEDURE — 99215 PR OFFICE/OUTPT VISIT, EST, LEVL V, 40-54 MIN: ICD-10-PCS | Mod: S$GLB,,, | Performed by: NURSE PRACTITIONER

## 2021-03-01 PROCEDURE — 3044F HG A1C LEVEL LT 7.0%: CPT | Mod: CPTII,S$GLB,, | Performed by: NURSE PRACTITIONER

## 2021-03-01 PROCEDURE — 3288F FALL RISK ASSESSMENT DOCD: CPT | Mod: CPTII,S$GLB,, | Performed by: NURSE PRACTITIONER

## 2021-03-01 PROCEDURE — 86900 BLOOD TYPING SEROLOGIC ABO: CPT

## 2021-03-01 PROCEDURE — 3288F PR FALLS RISK ASSESSMENT DOCUMENTED: ICD-10-PCS | Mod: CPTII,S$GLB,, | Performed by: NURSE PRACTITIONER

## 2021-03-01 PROCEDURE — 1126F PR PAIN SEVERITY QUANTIFIED, NO PAIN PRESENT: ICD-10-PCS | Mod: S$GLB,,, | Performed by: NURSE PRACTITIONER

## 2021-03-01 PROCEDURE — 36415 COLL VENOUS BLD VENIPUNCTURE: CPT

## 2021-03-02 ENCOUNTER — INFUSION (OUTPATIENT)
Dept: INFUSION THERAPY | Facility: HOSPITAL | Age: 71
End: 2021-03-02
Attending: NURSE PRACTITIONER
Payer: MEDICARE

## 2021-03-02 VITALS
RESPIRATION RATE: 18 BRPM | HEART RATE: 78 BPM | OXYGEN SATURATION: 99 % | DIASTOLIC BLOOD PRESSURE: 84 MMHG | SYSTOLIC BLOOD PRESSURE: 176 MMHG | TEMPERATURE: 98 F

## 2021-03-02 DIAGNOSIS — D46.C MDS (MYELODYSPLASTIC SYNDROME) WITH 5Q DELETION: ICD-10-CM

## 2021-03-02 DIAGNOSIS — D46.C MDS (MYELODYSPLASTIC SYNDROME) WITH 5Q DELETION: Primary | ICD-10-CM

## 2021-03-02 DIAGNOSIS — D64.9 ANEMIA REQUIRING TRANSFUSIONS: ICD-10-CM

## 2021-03-02 PROCEDURE — 63600175 PHARM REV CODE 636 W HCPCS: Performed by: NURSE PRACTITIONER

## 2021-03-02 PROCEDURE — P9040 RBC LEUKOREDUCED IRRADIATED: HCPCS

## 2021-03-02 PROCEDURE — 36430 TRANSFUSION BLD/BLD COMPNT: CPT

## 2021-03-02 PROCEDURE — 25000003 PHARM REV CODE 250: Performed by: INTERNAL MEDICINE

## 2021-03-02 PROCEDURE — 86922 COMPATIBILITY TEST ANTIGLOB: CPT

## 2021-03-02 RX ORDER — HEPARIN 100 UNIT/ML
500 SYRINGE INTRAVENOUS
Status: COMPLETED | OUTPATIENT
Start: 2021-03-02 | End: 2021-03-02

## 2021-03-02 RX ORDER — HYDROCODONE BITARTRATE AND ACETAMINOPHEN 500; 5 MG/1; MG/1
TABLET ORAL ONCE
Status: COMPLETED | OUTPATIENT
Start: 2021-03-02 | End: 2021-03-02

## 2021-03-02 RX ORDER — DIPHENHYDRAMINE HCL 25 MG
25 CAPSULE ORAL
Status: CANCELLED | OUTPATIENT
Start: 2021-03-02

## 2021-03-02 RX ORDER — ACETAMINOPHEN 325 MG/1
650 TABLET ORAL
Status: COMPLETED | OUTPATIENT
Start: 2021-03-02 | End: 2021-03-02

## 2021-03-02 RX ORDER — DIPHENHYDRAMINE HCL 25 MG
25 CAPSULE ORAL
Status: COMPLETED | OUTPATIENT
Start: 2021-03-02 | End: 2021-03-02

## 2021-03-02 RX ORDER — ACETAMINOPHEN 325 MG/1
650 TABLET ORAL
Status: CANCELLED | OUTPATIENT
Start: 2021-03-02

## 2021-03-02 RX ORDER — HYDROCODONE BITARTRATE AND ACETAMINOPHEN 500; 5 MG/1; MG/1
TABLET ORAL ONCE
Status: CANCELLED | OUTPATIENT
Start: 2021-03-02 | End: 2021-03-02

## 2021-03-02 RX ADMIN — ACETAMINOPHEN 650 MG: 325 TABLET ORAL at 11:03

## 2021-03-02 RX ADMIN — HEPARIN 500 UNITS: 100 SYRINGE at 01:03

## 2021-03-02 RX ADMIN — SODIUM CHLORIDE: 0.9 INJECTION, SOLUTION INTRAVENOUS at 11:03

## 2021-03-02 RX ADMIN — DIPHENHYDRAMINE HYDROCHLORIDE 25 MG: 25 CAPSULE ORAL at 11:03

## 2021-03-04 ENCOUNTER — PATIENT MESSAGE (OUTPATIENT)
Dept: HEMATOLOGY/ONCOLOGY | Facility: CLINIC | Age: 71
End: 2021-03-04

## 2021-03-04 ENCOUNTER — LAB VISIT (OUTPATIENT)
Dept: LAB | Facility: HOSPITAL | Age: 71
End: 2021-03-04
Attending: INTERNAL MEDICINE
Payer: MEDICARE

## 2021-03-04 DIAGNOSIS — D46.C MDS (MYELODYSPLASTIC SYNDROME) WITH 5Q DELETION: ICD-10-CM

## 2021-03-04 DIAGNOSIS — Z51.89 ENCOUNTER FOR BLOOD TRANSFUSION: ICD-10-CM

## 2021-03-04 DIAGNOSIS — D64.9 ANEMIA REQUIRING TRANSFUSIONS: ICD-10-CM

## 2021-03-04 LAB
ABO + RH BLD: NORMAL
ANISOCYTOSIS BLD QL SMEAR: SLIGHT
BASOPHILS # BLD AUTO: 0.01 K/UL (ref 0–0.2)
BASOPHILS NFR BLD: 1.7 % (ref 0–1.9)
BLD GP AB SCN CELLS X3 SERPL QL: NORMAL
DACRYOCYTES BLD QL SMEAR: ABNORMAL
DIFFERENTIAL METHOD: ABNORMAL
EOSINOPHIL # BLD AUTO: 0 K/UL (ref 0–0.5)
EOSINOPHIL NFR BLD: 3.4 % (ref 0–8)
ERYTHROCYTE [DISTWIDTH] IN BLOOD BY AUTOMATED COUNT: 16.5 % (ref 11.5–14.5)
HCT VFR BLD AUTO: 24.3 % (ref 37–48.5)
HGB BLD-MCNC: 8.3 G/DL (ref 12–16)
IMM GRANULOCYTES # BLD AUTO: 0 K/UL (ref 0–0.04)
IMM GRANULOCYTES NFR BLD AUTO: 0 % (ref 0–0.5)
LYMPHOCYTES # BLD AUTO: 0.4 K/UL (ref 1–4.8)
LYMPHOCYTES NFR BLD: 67.8 % (ref 18–48)
MCH RBC QN AUTO: 30.3 PG (ref 27–31)
MCHC RBC AUTO-ENTMCNC: 34.2 G/DL (ref 32–36)
MCV RBC AUTO: 89 FL (ref 82–98)
MONOCYTES # BLD AUTO: 0 K/UL (ref 0.3–1)
MONOCYTES NFR BLD: 5.1 % (ref 4–15)
NEUTROPHILS # BLD AUTO: 0.1 K/UL (ref 1.8–7.7)
NEUTROPHILS NFR BLD: 22 % (ref 38–73)
NRBC BLD-RTO: 0 /100 WBC
OVALOCYTES BLD QL SMEAR: ABNORMAL
PLATELET # BLD AUTO: 213 K/UL (ref 150–350)
PLATELET BLD QL SMEAR: ABNORMAL
PMV BLD AUTO: 11.2 FL (ref 9.2–12.9)
RBC # BLD AUTO: 2.74 M/UL (ref 4–5.4)
WBC # BLD AUTO: 0.59 K/UL (ref 3.9–12.7)

## 2021-03-04 PROCEDURE — 36415 COLL VENOUS BLD VENIPUNCTURE: CPT | Performed by: INTERNAL MEDICINE

## 2021-03-04 PROCEDURE — 86900 BLOOD TYPING SEROLOGIC ABO: CPT | Performed by: INTERNAL MEDICINE

## 2021-03-04 PROCEDURE — 85025 COMPLETE CBC W/AUTO DIFF WBC: CPT | Performed by: INTERNAL MEDICINE

## 2021-03-08 ENCOUNTER — OFFICE VISIT (OUTPATIENT)
Dept: HEMATOLOGY/ONCOLOGY | Facility: CLINIC | Age: 71
End: 2021-03-08
Payer: MEDICARE

## 2021-03-08 ENCOUNTER — INFUSION (OUTPATIENT)
Dept: INFUSION THERAPY | Facility: HOSPITAL | Age: 71
End: 2021-03-08
Attending: NURSE PRACTITIONER
Payer: MEDICARE

## 2021-03-08 ENCOUNTER — INITIAL CONSULT (OUTPATIENT)
Dept: VASCULAR SURGERY | Facility: CLINIC | Age: 71
End: 2021-03-08
Payer: MEDICARE

## 2021-03-08 VITALS
DIASTOLIC BLOOD PRESSURE: 77 MMHG | OXYGEN SATURATION: 98 % | RESPIRATION RATE: 17 BRPM | TEMPERATURE: 98 F | SYSTOLIC BLOOD PRESSURE: 184 MMHG | WEIGHT: 172.63 LBS | BODY MASS INDEX: 28.76 KG/M2 | HEART RATE: 64 BPM | HEIGHT: 65 IN

## 2021-03-08 VITALS — WEIGHT: 172.63 LBS | BODY MASS INDEX: 28.76 KG/M2 | HEIGHT: 65 IN

## 2021-03-08 VITALS
WEIGHT: 171.94 LBS | TEMPERATURE: 98 F | BODY MASS INDEX: 28.65 KG/M2 | DIASTOLIC BLOOD PRESSURE: 70 MMHG | SYSTOLIC BLOOD PRESSURE: 152 MMHG | HEART RATE: 71 BPM | HEIGHT: 65 IN

## 2021-03-08 DIAGNOSIS — D46.C MDS (MYELODYSPLASTIC SYNDROME) WITH 5Q DELETION: Primary | ICD-10-CM

## 2021-03-08 DIAGNOSIS — I65.21 CAROTID STENOSIS, ASYMPTOMATIC, RIGHT: ICD-10-CM

## 2021-03-08 DIAGNOSIS — D64.9 ANEMIA REQUIRING TRANSFUSIONS: ICD-10-CM

## 2021-03-08 PROCEDURE — 1159F MED LIST DOCD IN RCRD: CPT | Mod: S$GLB,,, | Performed by: INTERNAL MEDICINE

## 2021-03-08 PROCEDURE — 1126F AMNT PAIN NOTED NONE PRSNT: CPT | Mod: S$GLB,,, | Performed by: INTERNAL MEDICINE

## 2021-03-08 PROCEDURE — 3077F SYST BP >= 140 MM HG: CPT | Mod: CPTII,S$GLB,, | Performed by: INTERNAL MEDICINE

## 2021-03-08 PROCEDURE — 99999 PR PBB SHADOW E&M-EST. PATIENT-LVL V: ICD-10-PCS | Mod: PBBFAC,,, | Performed by: INTERNAL MEDICINE

## 2021-03-08 PROCEDURE — 99215 PR OFFICE/OUTPT VISIT, EST, LEVL V, 40-54 MIN: ICD-10-PCS | Mod: S$GLB,,, | Performed by: INTERNAL MEDICINE

## 2021-03-08 PROCEDURE — 3008F BODY MASS INDEX DOCD: CPT | Mod: CPTII,S$GLB,, | Performed by: INTERNAL MEDICINE

## 2021-03-08 PROCEDURE — 96401 CHEMO ANTI-NEOPL SQ/IM: CPT

## 2021-03-08 PROCEDURE — 3078F DIAST BP <80 MM HG: CPT | Mod: CPTII,S$GLB,, | Performed by: INTERNAL MEDICINE

## 2021-03-08 PROCEDURE — 99203 PR OFFICE/OUTPT VISIT, NEW, LEVL III, 30-44 MIN: ICD-10-PCS | Mod: S$GLB,,, | Performed by: SURGERY

## 2021-03-08 PROCEDURE — 1157F PR ADVANCE CARE PLAN OR EQUIV PRESENT IN MEDICAL RECORD: ICD-10-PCS | Mod: S$GLB,,, | Performed by: INTERNAL MEDICINE

## 2021-03-08 PROCEDURE — 99999 PR PBB SHADOW E&M-EST. PATIENT-LVL V: ICD-10-PCS | Mod: PBBFAC,,, | Performed by: SURGERY

## 2021-03-08 PROCEDURE — 99203 OFFICE O/P NEW LOW 30 MIN: CPT | Mod: S$GLB,,, | Performed by: SURGERY

## 2021-03-08 PROCEDURE — 3077F PR MOST RECENT SYSTOLIC BLOOD PRESSURE >= 140 MM HG: ICD-10-PCS | Mod: CPTII,S$GLB,, | Performed by: INTERNAL MEDICINE

## 2021-03-08 PROCEDURE — 1126F PR PAIN SEVERITY QUANTIFIED, NO PAIN PRESENT: ICD-10-PCS | Mod: S$GLB,,, | Performed by: INTERNAL MEDICINE

## 2021-03-08 PROCEDURE — 1159F PR MEDICATION LIST DOCUMENTED IN MEDICAL RECORD: ICD-10-PCS | Mod: S$GLB,,, | Performed by: INTERNAL MEDICINE

## 2021-03-08 PROCEDURE — 99215 OFFICE O/P EST HI 40 MIN: CPT | Mod: S$GLB,,, | Performed by: INTERNAL MEDICINE

## 2021-03-08 PROCEDURE — 99999 PR PBB SHADOW E&M-EST. PATIENT-LVL V: CPT | Mod: PBBFAC,,, | Performed by: INTERNAL MEDICINE

## 2021-03-08 PROCEDURE — 99999 PR PBB SHADOW E&M-EST. PATIENT-LVL V: CPT | Mod: PBBFAC,,, | Performed by: SURGERY

## 2021-03-08 PROCEDURE — 3078F PR MOST RECENT DIASTOLIC BLOOD PRESSURE < 80 MM HG: ICD-10-PCS | Mod: CPTII,S$GLB,, | Performed by: INTERNAL MEDICINE

## 2021-03-08 PROCEDURE — 3008F PR BODY MASS INDEX (BMI) DOCUMENTED: ICD-10-PCS | Mod: CPTII,S$GLB,, | Performed by: INTERNAL MEDICINE

## 2021-03-08 PROCEDURE — 1157F ADVNC CARE PLAN IN RCRD: CPT | Mod: S$GLB,,, | Performed by: INTERNAL MEDICINE

## 2021-03-08 PROCEDURE — 63600175 PHARM REV CODE 636 W HCPCS: Mod: JG | Performed by: INTERNAL MEDICINE

## 2021-03-08 RX ORDER — AZACITIDINE 100 MG/1
75 INJECTION, POWDER, LYOPHILIZED, FOR SOLUTION INTRAVENOUS; SUBCUTANEOUS
Status: CANCELLED | OUTPATIENT
Start: 2021-03-12

## 2021-03-08 RX ORDER — AZACITIDINE 100 MG/1
75 INJECTION, POWDER, LYOPHILIZED, FOR SOLUTION INTRAVENOUS; SUBCUTANEOUS
Status: COMPLETED | OUTPATIENT
Start: 2021-03-08 | End: 2021-03-08

## 2021-03-08 RX ORDER — AZACITIDINE 100 MG/1
75 INJECTION, POWDER, LYOPHILIZED, FOR SOLUTION INTRAVENOUS; SUBCUTANEOUS
Status: CANCELLED | OUTPATIENT
Start: 2021-03-08

## 2021-03-08 RX ORDER — AZACITIDINE 100 MG/1
75 INJECTION, POWDER, LYOPHILIZED, FOR SOLUTION INTRAVENOUS; SUBCUTANEOUS
Status: CANCELLED | OUTPATIENT
Start: 2021-03-09

## 2021-03-08 RX ORDER — ONDANSETRON 8 MG/1
8 TABLET, ORALLY DISINTEGRATING ORAL ONCE
Status: CANCELLED | OUTPATIENT
Start: 2021-03-08 | End: 2021-03-08

## 2021-03-08 RX ORDER — AZACITIDINE 100 MG/1
75 INJECTION, POWDER, LYOPHILIZED, FOR SOLUTION INTRAVENOUS; SUBCUTANEOUS
Status: CANCELLED | OUTPATIENT
Start: 2021-03-10

## 2021-03-08 RX ORDER — AZACITIDINE 100 MG/1
75 INJECTION, POWDER, LYOPHILIZED, FOR SOLUTION INTRAVENOUS; SUBCUTANEOUS
Status: CANCELLED | OUTPATIENT
Start: 2021-03-11

## 2021-03-08 RX ORDER — ONDANSETRON 8 MG/1
8 TABLET, ORALLY DISINTEGRATING ORAL ONCE
Status: CANCELLED | OUTPATIENT
Start: 2021-03-09 | End: 2021-03-08

## 2021-03-08 RX ORDER — ONDANSETRON 8 MG/1
8 TABLET, ORALLY DISINTEGRATING ORAL ONCE
Status: CANCELLED | OUTPATIENT
Start: 2021-03-11 | End: 2021-03-08

## 2021-03-08 RX ORDER — ONDANSETRON 8 MG/1
8 TABLET, ORALLY DISINTEGRATING ORAL ONCE
Status: CANCELLED | OUTPATIENT
Start: 2021-03-12 | End: 2021-03-08

## 2021-03-08 RX ORDER — ONDANSETRON 8 MG/1
8 TABLET, ORALLY DISINTEGRATING ORAL ONCE
Status: CANCELLED | OUTPATIENT
Start: 2021-03-10 | End: 2021-03-08

## 2021-03-08 RX ADMIN — AZACITIDINE 145 MG: 100 INJECTION, POWDER, LYOPHILIZED, FOR SOLUTION SUBCUTANEOUS at 12:03

## 2021-03-09 ENCOUNTER — INFUSION (OUTPATIENT)
Dept: INFUSION THERAPY | Facility: HOSPITAL | Age: 71
End: 2021-03-09
Payer: MEDICARE

## 2021-03-09 VITALS
SYSTOLIC BLOOD PRESSURE: 177 MMHG | TEMPERATURE: 98 F | RESPIRATION RATE: 18 BRPM | DIASTOLIC BLOOD PRESSURE: 74 MMHG | HEART RATE: 67 BPM

## 2021-03-09 DIAGNOSIS — D46.C MDS (MYELODYSPLASTIC SYNDROME) WITH 5Q DELETION: Primary | ICD-10-CM

## 2021-03-09 DIAGNOSIS — Z51.11 CHEMOTHERAPY MANAGEMENT, ENCOUNTER FOR: ICD-10-CM

## 2021-03-09 PROCEDURE — 63600175 PHARM REV CODE 636 W HCPCS: Mod: JW,JG | Performed by: INTERNAL MEDICINE

## 2021-03-09 PROCEDURE — 96401 CHEMO ANTI-NEOPL SQ/IM: CPT

## 2021-03-09 RX ORDER — AZACITIDINE 100 MG/1
75 INJECTION, POWDER, LYOPHILIZED, FOR SOLUTION INTRAVENOUS; SUBCUTANEOUS
Status: COMPLETED | OUTPATIENT
Start: 2021-03-09 | End: 2021-03-09

## 2021-03-09 RX ADMIN — AZACITIDINE 145 MG: 100 INJECTION, POWDER, LYOPHILIZED, FOR SOLUTION SUBCUTANEOUS at 02:03

## 2021-03-10 ENCOUNTER — INFUSION (OUTPATIENT)
Dept: INFUSION THERAPY | Facility: HOSPITAL | Age: 71
End: 2021-03-10
Payer: MEDICARE

## 2021-03-10 VITALS — SYSTOLIC BLOOD PRESSURE: 149 MMHG | RESPIRATION RATE: 18 BRPM | HEART RATE: 75 BPM | DIASTOLIC BLOOD PRESSURE: 65 MMHG

## 2021-03-10 DIAGNOSIS — D46.C MDS (MYELODYSPLASTIC SYNDROME) WITH 5Q DELETION: Primary | ICD-10-CM

## 2021-03-10 PROCEDURE — 96401 CHEMO ANTI-NEOPL SQ/IM: CPT

## 2021-03-10 PROCEDURE — 63600175 PHARM REV CODE 636 W HCPCS: Mod: JG | Performed by: INTERNAL MEDICINE

## 2021-03-10 RX ORDER — AZACITIDINE 100 MG/1
75 INJECTION, POWDER, LYOPHILIZED, FOR SOLUTION INTRAVENOUS; SUBCUTANEOUS
Status: COMPLETED | OUTPATIENT
Start: 2021-03-10 | End: 2021-03-10

## 2021-03-10 RX ADMIN — AZACITIDINE 145 MG: 100 INJECTION, POWDER, LYOPHILIZED, FOR SOLUTION SUBCUTANEOUS at 12:03

## 2021-03-11 ENCOUNTER — INFUSION (OUTPATIENT)
Dept: INFUSION THERAPY | Facility: HOSPITAL | Age: 71
End: 2021-03-11
Attending: NURSE PRACTITIONER
Payer: MEDICARE

## 2021-03-11 ENCOUNTER — PATIENT MESSAGE (OUTPATIENT)
Dept: HEMATOLOGY/ONCOLOGY | Facility: CLINIC | Age: 71
End: 2021-03-11

## 2021-03-11 ENCOUNTER — LAB VISIT (OUTPATIENT)
Dept: LAB | Facility: HOSPITAL | Age: 71
End: 2021-03-11
Attending: INTERNAL MEDICINE
Payer: MEDICARE

## 2021-03-11 VITALS
SYSTOLIC BLOOD PRESSURE: 135 MMHG | RESPIRATION RATE: 18 BRPM | DIASTOLIC BLOOD PRESSURE: 61 MMHG | HEART RATE: 61 BPM | TEMPERATURE: 98 F

## 2021-03-11 DIAGNOSIS — D46.C MDS (MYELODYSPLASTIC SYNDROME) WITH 5Q DELETION: ICD-10-CM

## 2021-03-11 DIAGNOSIS — E87.6 HYPOKALEMIA: ICD-10-CM

## 2021-03-11 DIAGNOSIS — D64.9 ANEMIA REQUIRING TRANSFUSIONS: ICD-10-CM

## 2021-03-11 DIAGNOSIS — D46.C MDS (MYELODYSPLASTIC SYNDROME) WITH 5Q DELETION: Primary | ICD-10-CM

## 2021-03-11 DIAGNOSIS — Z51.89 ENCOUNTER FOR BLOOD TRANSFUSION: ICD-10-CM

## 2021-03-11 LAB
ABO + RH BLD: NORMAL
BASOPHILS # BLD AUTO: 0.01 K/UL (ref 0–0.2)
BASOPHILS NFR BLD: 0.6 % (ref 0–1.9)
BLD GP AB SCN CELLS X3 SERPL QL: NORMAL
DIFFERENTIAL METHOD: ABNORMAL
EOSINOPHIL # BLD AUTO: 0 K/UL (ref 0–0.5)
EOSINOPHIL NFR BLD: 1.3 % (ref 0–8)
ERYTHROCYTE [DISTWIDTH] IN BLOOD BY AUTOMATED COUNT: 15.9 % (ref 11.5–14.5)
HCT VFR BLD AUTO: 23.3 % (ref 37–48.5)
HGB BLD-MCNC: 7.6 G/DL (ref 12–16)
IMM GRANULOCYTES # BLD AUTO: 0.01 K/UL (ref 0–0.04)
IMM GRANULOCYTES NFR BLD AUTO: 0.6 % (ref 0–0.5)
LYMPHOCYTES # BLD AUTO: 0.8 K/UL (ref 1–4.8)
LYMPHOCYTES NFR BLD: 53.5 % (ref 18–48)
MCH RBC QN AUTO: 29.3 PG (ref 27–31)
MCHC RBC AUTO-ENTMCNC: 32.6 G/DL (ref 32–36)
MCV RBC AUTO: 90 FL (ref 82–98)
MONOCYTES # BLD AUTO: 0.1 K/UL (ref 0.3–1)
MONOCYTES NFR BLD: 3.2 % (ref 4–15)
NEUTROPHILS # BLD AUTO: 0.6 K/UL (ref 1.8–7.7)
NEUTROPHILS NFR BLD: 40.8 % (ref 38–73)
NRBC BLD-RTO: 0 /100 WBC
PLATELET # BLD AUTO: 108 K/UL (ref 150–350)
PMV BLD AUTO: 11.1 FL (ref 9.2–12.9)
RBC # BLD AUTO: 2.59 M/UL (ref 4–5.4)
WBC # BLD AUTO: 1.57 K/UL (ref 3.9–12.7)

## 2021-03-11 PROCEDURE — 36415 COLL VENOUS BLD VENIPUNCTURE: CPT | Performed by: INTERNAL MEDICINE

## 2021-03-11 PROCEDURE — 86900 BLOOD TYPING SEROLOGIC ABO: CPT | Performed by: INTERNAL MEDICINE

## 2021-03-11 PROCEDURE — 96401 CHEMO ANTI-NEOPL SQ/IM: CPT

## 2021-03-11 PROCEDURE — 85025 COMPLETE CBC W/AUTO DIFF WBC: CPT | Performed by: INTERNAL MEDICINE

## 2021-03-11 PROCEDURE — 63600175 PHARM REV CODE 636 W HCPCS: Mod: JG | Performed by: INTERNAL MEDICINE

## 2021-03-11 RX ORDER — ONDANSETRON 4 MG/1
8 TABLET, ORALLY DISINTEGRATING ORAL ONCE
Status: DISCONTINUED | OUTPATIENT
Start: 2021-03-11 | End: 2021-03-11 | Stop reason: HOSPADM

## 2021-03-11 RX ORDER — AZACITIDINE 100 MG/1
75 INJECTION, POWDER, LYOPHILIZED, FOR SOLUTION INTRAVENOUS; SUBCUTANEOUS
Status: COMPLETED | OUTPATIENT
Start: 2021-03-11 | End: 2021-03-11

## 2021-03-11 RX ADMIN — AZACITIDINE 145 MG: 100 INJECTION, POWDER, LYOPHILIZED, FOR SOLUTION SUBCUTANEOUS at 02:03

## 2021-03-12 ENCOUNTER — INFUSION (OUTPATIENT)
Dept: INFUSION THERAPY | Facility: HOSPITAL | Age: 71
End: 2021-03-12
Attending: NURSE PRACTITIONER
Payer: MEDICARE

## 2021-03-12 VITALS
SYSTOLIC BLOOD PRESSURE: 128 MMHG | RESPIRATION RATE: 18 BRPM | HEART RATE: 72 BPM | TEMPERATURE: 98 F | DIASTOLIC BLOOD PRESSURE: 72 MMHG | OXYGEN SATURATION: 100 %

## 2021-03-12 DIAGNOSIS — E87.6 HYPOKALEMIA: ICD-10-CM

## 2021-03-12 DIAGNOSIS — D46.C MDS (MYELODYSPLASTIC SYNDROME) WITH 5Q DELETION: Primary | ICD-10-CM

## 2021-03-12 PROCEDURE — 63600175 PHARM REV CODE 636 W HCPCS: Mod: JG | Performed by: INTERNAL MEDICINE

## 2021-03-12 PROCEDURE — 96401 CHEMO ANTI-NEOPL SQ/IM: CPT

## 2021-03-12 RX ORDER — AZACITIDINE 100 MG/1
75 INJECTION, POWDER, LYOPHILIZED, FOR SOLUTION INTRAVENOUS; SUBCUTANEOUS
Status: COMPLETED | OUTPATIENT
Start: 2021-03-12 | End: 2021-03-12

## 2021-03-12 RX ORDER — ONDANSETRON 4 MG/1
8 TABLET, ORALLY DISINTEGRATING ORAL ONCE
Status: DISCONTINUED | OUTPATIENT
Start: 2021-03-12 | End: 2021-03-12 | Stop reason: HOSPADM

## 2021-03-12 RX ADMIN — AZACITIDINE 145 MG: 100 INJECTION, POWDER, LYOPHILIZED, FOR SOLUTION SUBCUTANEOUS at 03:03

## 2021-03-15 ENCOUNTER — PATIENT MESSAGE (OUTPATIENT)
Dept: INFUSION THERAPY | Facility: HOSPITAL | Age: 71
End: 2021-03-15

## 2021-03-15 ENCOUNTER — LAB VISIT (OUTPATIENT)
Dept: LAB | Facility: HOSPITAL | Age: 71
End: 2021-03-15
Attending: INTERNAL MEDICINE
Payer: MEDICARE

## 2021-03-15 ENCOUNTER — PATIENT MESSAGE (OUTPATIENT)
Dept: HEMATOLOGY/ONCOLOGY | Facility: CLINIC | Age: 71
End: 2021-03-15

## 2021-03-15 DIAGNOSIS — D46.C MDS (MYELODYSPLASTIC SYNDROME) WITH 5Q DELETION: ICD-10-CM

## 2021-03-15 DIAGNOSIS — D64.9 ANEMIA REQUIRING TRANSFUSIONS: ICD-10-CM

## 2021-03-15 DIAGNOSIS — Z51.89 ENCOUNTER FOR BLOOD TRANSFUSION: ICD-10-CM

## 2021-03-15 DIAGNOSIS — D46.C MDS (MYELODYSPLASTIC SYNDROME) WITH 5Q DELETION: Primary | ICD-10-CM

## 2021-03-15 LAB
ABO + RH BLD: NORMAL
BASOPHILS # BLD AUTO: 0.01 K/UL (ref 0–0.2)
BASOPHILS NFR BLD: 0.7 % (ref 0–1.9)
BLD GP AB SCN CELLS X3 SERPL QL: NORMAL
DIFFERENTIAL METHOD: ABNORMAL
EOSINOPHIL # BLD AUTO: 0 K/UL (ref 0–0.5)
EOSINOPHIL NFR BLD: 1.4 % (ref 0–8)
ERYTHROCYTE [DISTWIDTH] IN BLOOD BY AUTOMATED COUNT: 15.9 % (ref 11.5–14.5)
HCT VFR BLD AUTO: 20.7 % (ref 37–48.5)
HGB BLD-MCNC: 6.9 G/DL (ref 12–16)
IMM GRANULOCYTES # BLD AUTO: 0.03 K/UL (ref 0–0.04)
IMM GRANULOCYTES NFR BLD AUTO: 2.1 % (ref 0–0.5)
LYMPHOCYTES # BLD AUTO: 0.8 K/UL (ref 1–4.8)
LYMPHOCYTES NFR BLD: 57.4 % (ref 18–48)
MCH RBC QN AUTO: 29.7 PG (ref 27–31)
MCHC RBC AUTO-ENTMCNC: 33.3 G/DL (ref 32–36)
MCV RBC AUTO: 89 FL (ref 82–98)
MONOCYTES # BLD AUTO: 0 K/UL (ref 0.3–1)
MONOCYTES NFR BLD: 2.8 % (ref 4–15)
NEUTROPHILS # BLD AUTO: 0.5 K/UL (ref 1.8–7.7)
NEUTROPHILS NFR BLD: 35.6 % (ref 38–73)
NRBC BLD-RTO: 0 /100 WBC
PLATELET # BLD AUTO: 80 K/UL (ref 150–350)
PMV BLD AUTO: 11.6 FL (ref 9.2–12.9)
RBC # BLD AUTO: 2.32 M/UL (ref 4–5.4)
WBC # BLD AUTO: 1.41 K/UL (ref 3.9–12.7)

## 2021-03-15 PROCEDURE — 86900 BLOOD TYPING SEROLOGIC ABO: CPT | Performed by: INTERNAL MEDICINE

## 2021-03-15 PROCEDURE — 85025 COMPLETE CBC W/AUTO DIFF WBC: CPT | Performed by: INTERNAL MEDICINE

## 2021-03-15 RX ORDER — ACETAMINOPHEN 325 MG/1
650 TABLET ORAL
Status: CANCELLED | OUTPATIENT
Start: 2021-03-15

## 2021-03-15 RX ORDER — DIPHENHYDRAMINE HCL 25 MG
25 CAPSULE ORAL
Status: CANCELLED | OUTPATIENT
Start: 2021-03-15

## 2021-03-15 RX ORDER — HYDROCODONE BITARTRATE AND ACETAMINOPHEN 500; 5 MG/1; MG/1
TABLET ORAL ONCE
Status: CANCELLED | OUTPATIENT
Start: 2021-03-15 | End: 2021-03-15

## 2021-03-16 ENCOUNTER — HOSPITAL ENCOUNTER (OUTPATIENT)
Dept: RADIOLOGY | Facility: HOSPITAL | Age: 71
Discharge: HOME | End: 2021-03-16
Attending: NURSE PRACTITIONER
Payer: MEDICARE

## 2021-03-16 ENCOUNTER — INFUSION (OUTPATIENT)
Dept: INFUSION THERAPY | Facility: HOSPITAL | Age: 71
End: 2021-03-16
Attending: INTERNAL MEDICINE
Payer: MEDICARE

## 2021-03-16 VITALS
DIASTOLIC BLOOD PRESSURE: 77 MMHG | RESPIRATION RATE: 18 BRPM | HEART RATE: 73 BPM | TEMPERATURE: 99 F | OXYGEN SATURATION: 93 % | SYSTOLIC BLOOD PRESSURE: 173 MMHG

## 2021-03-16 DIAGNOSIS — D46.C MDS (MYELODYSPLASTIC SYNDROME) WITH 5Q DELETION: Primary | ICD-10-CM

## 2021-03-16 DIAGNOSIS — D46.9 MDS (MYELODYSPLASTIC SYNDROME): ICD-10-CM

## 2021-03-16 DIAGNOSIS — R06.02 SHORTNESS OF BREATH: ICD-10-CM

## 2021-03-16 DIAGNOSIS — D64.9 ANEMIA REQUIRING TRANSFUSIONS: ICD-10-CM

## 2021-03-16 DIAGNOSIS — D46.C MDS (MYELODYSPLASTIC SYNDROME) WITH 5Q DELETION: ICD-10-CM

## 2021-03-16 PROCEDURE — P9040 RBC LEUKOREDUCED IRRADIATED: HCPCS | Performed by: INTERNAL MEDICINE

## 2021-03-16 PROCEDURE — 96375 TX/PRO/DX INJ NEW DRUG ADDON: CPT

## 2021-03-16 PROCEDURE — 63600175 PHARM REV CODE 636 W HCPCS: Performed by: NURSE PRACTITIONER

## 2021-03-16 PROCEDURE — 25000003 PHARM REV CODE 250: Performed by: INTERNAL MEDICINE

## 2021-03-16 PROCEDURE — 36430 TRANSFUSION BLD/BLD COMPNT: CPT

## 2021-03-16 PROCEDURE — 71046 X-RAY EXAM CHEST 2 VIEWS: CPT | Mod: TC

## 2021-03-16 PROCEDURE — 71046 XR CHEST PA AND LATERAL: ICD-10-PCS | Mod: 26,,, | Performed by: RADIOLOGY

## 2021-03-16 PROCEDURE — 96374 THER/PROPH/DIAG INJ IV PUSH: CPT

## 2021-03-16 PROCEDURE — 86922 COMPATIBILITY TEST ANTIGLOB: CPT | Performed by: INTERNAL MEDICINE

## 2021-03-16 PROCEDURE — 63600175 PHARM REV CODE 636 W HCPCS: Performed by: INTERNAL MEDICINE

## 2021-03-16 PROCEDURE — 71046 X-RAY EXAM CHEST 2 VIEWS: CPT | Mod: 26,,, | Performed by: RADIOLOGY

## 2021-03-16 PROCEDURE — A4216 STERILE WATER/SALINE, 10 ML: HCPCS | Performed by: INTERNAL MEDICINE

## 2021-03-16 RX ORDER — DIPHENHYDRAMINE HCL 25 MG
25 CAPSULE ORAL
Status: COMPLETED | OUTPATIENT
Start: 2021-03-16 | End: 2021-03-16

## 2021-03-16 RX ORDER — FUROSEMIDE 10 MG/ML
20 INJECTION INTRAMUSCULAR; INTRAVENOUS ONCE
Status: COMPLETED | OUTPATIENT
Start: 2021-03-16 | End: 2021-03-16

## 2021-03-16 RX ORDER — HYDROCODONE BITARTRATE AND ACETAMINOPHEN 500; 5 MG/1; MG/1
TABLET ORAL ONCE
Status: COMPLETED | OUTPATIENT
Start: 2021-03-16 | End: 2021-03-16

## 2021-03-16 RX ORDER — ACETAMINOPHEN 325 MG/1
650 TABLET ORAL
Status: COMPLETED | OUTPATIENT
Start: 2021-03-16 | End: 2021-03-16

## 2021-03-16 RX ORDER — HEPARIN 100 UNIT/ML
500 SYRINGE INTRAVENOUS
Status: COMPLETED | OUTPATIENT
Start: 2021-03-16 | End: 2021-03-16

## 2021-03-16 RX ORDER — SODIUM CHLORIDE 0.9 % (FLUSH) 0.9 %
10 SYRINGE (ML) INJECTION
Status: CANCELLED | OUTPATIENT
Start: 2021-03-16

## 2021-03-16 RX ORDER — HEPARIN 100 UNIT/ML
500 SYRINGE INTRAVENOUS
Status: CANCELLED | OUTPATIENT
Start: 2021-03-16

## 2021-03-16 RX ORDER — SODIUM CHLORIDE 0.9 % (FLUSH) 0.9 %
10 SYRINGE (ML) INJECTION
Status: COMPLETED | OUTPATIENT
Start: 2021-03-16 | End: 2021-03-16

## 2021-03-16 RX ADMIN — HEPARIN 500 UNITS: 100 SYRINGE at 05:03

## 2021-03-16 RX ADMIN — FUROSEMIDE 20 MG: 20 INJECTION, SOLUTION INTRAMUSCULAR; INTRAVENOUS at 04:03

## 2021-03-16 RX ADMIN — SODIUM CHLORIDE: 0.9 INJECTION, SOLUTION INTRAVENOUS at 02:03

## 2021-03-16 RX ADMIN — DIPHENHYDRAMINE HYDROCHLORIDE 25 MG: 25 CAPSULE ORAL at 02:03

## 2021-03-16 RX ADMIN — ACETAMINOPHEN 650 MG: 325 TABLET ORAL at 02:03

## 2021-03-16 RX ADMIN — Medication 10 ML: at 05:03

## 2021-03-22 ENCOUNTER — SPECIALTY PHARMACY (OUTPATIENT)
Dept: PHARMACY | Facility: CLINIC | Age: 71
End: 2021-03-22

## 2021-03-22 ENCOUNTER — LAB VISIT (OUTPATIENT)
Dept: LAB | Facility: HOSPITAL | Age: 71
End: 2021-03-22
Attending: INTERNAL MEDICINE
Payer: MEDICARE

## 2021-03-22 DIAGNOSIS — Z51.89 ENCOUNTER FOR BLOOD TRANSFUSION: ICD-10-CM

## 2021-03-22 DIAGNOSIS — D46.C MDS (MYELODYSPLASTIC SYNDROME) WITH 5Q DELETION: ICD-10-CM

## 2021-03-22 DIAGNOSIS — D64.9 ANEMIA REQUIRING TRANSFUSIONS: ICD-10-CM

## 2021-03-22 LAB
ABO + RH BLD: NORMAL
ANISOCYTOSIS BLD QL SMEAR: SLIGHT
BASOPHILS # BLD AUTO: ABNORMAL K/UL (ref 0–0.2)
BASOPHILS NFR BLD: 0 % (ref 0–1.9)
BLD GP AB SCN CELLS X3 SERPL QL: NORMAL
DIFFERENTIAL METHOD: ABNORMAL
EOSINOPHIL # BLD AUTO: ABNORMAL K/UL (ref 0–0.5)
EOSINOPHIL NFR BLD: 4 % (ref 0–8)
ERYTHROCYTE [DISTWIDTH] IN BLOOD BY AUTOMATED COUNT: 15.1 % (ref 11.5–14.5)
HCT VFR BLD AUTO: 23.8 % (ref 37–48.5)
HGB BLD-MCNC: 7.9 G/DL (ref 12–16)
HYPOCHROMIA BLD QL SMEAR: ABNORMAL
IMM GRANULOCYTES # BLD AUTO: ABNORMAL K/UL (ref 0–0.04)
IMM GRANULOCYTES NFR BLD AUTO: ABNORMAL % (ref 0–0.5)
LYMPHOCYTES # BLD AUTO: ABNORMAL K/UL (ref 1–4.8)
LYMPHOCYTES NFR BLD: 56 % (ref 18–48)
MCH RBC QN AUTO: 29.6 PG (ref 27–31)
MCHC RBC AUTO-ENTMCNC: 33.2 G/DL (ref 32–36)
MCV RBC AUTO: 89 FL (ref 82–98)
MONOCYTES # BLD AUTO: ABNORMAL K/UL (ref 0.3–1)
MONOCYTES NFR BLD: 2 % (ref 4–15)
NEUTROPHILS NFR BLD: 37 % (ref 38–73)
NEUTS BAND NFR BLD MANUAL: 1 %
NRBC BLD-RTO: 0 /100 WBC
OVALOCYTES BLD QL SMEAR: ABNORMAL
PLATELET # BLD AUTO: 62 K/UL (ref 150–350)
PMV BLD AUTO: 11.1 FL (ref 9.2–12.9)
POIKILOCYTOSIS BLD QL SMEAR: SLIGHT
POLYCHROMASIA BLD QL SMEAR: ABNORMAL
RBC # BLD AUTO: 2.67 M/UL (ref 4–5.4)
SPHEROCYTES BLD QL SMEAR: ABNORMAL
WBC # BLD AUTO: 1.71 K/UL (ref 3.9–12.7)

## 2021-03-22 PROCEDURE — 85027 COMPLETE CBC AUTOMATED: CPT | Performed by: INTERNAL MEDICINE

## 2021-03-22 PROCEDURE — 36415 COLL VENOUS BLD VENIPUNCTURE: CPT | Performed by: INTERNAL MEDICINE

## 2021-03-22 PROCEDURE — 85007 BL SMEAR W/DIFF WBC COUNT: CPT | Performed by: INTERNAL MEDICINE

## 2021-03-22 PROCEDURE — 86900 BLOOD TYPING SEROLOGIC ABO: CPT | Performed by: INTERNAL MEDICINE

## 2021-03-25 ENCOUNTER — PATIENT MESSAGE (OUTPATIENT)
Dept: HEMATOLOGY/ONCOLOGY | Facility: CLINIC | Age: 71
End: 2021-03-25

## 2021-03-26 ENCOUNTER — LAB VISIT (OUTPATIENT)
Dept: LAB | Facility: HOSPITAL | Age: 71
End: 2021-03-26
Attending: INTERNAL MEDICINE
Payer: MEDICARE

## 2021-03-26 ENCOUNTER — PATIENT MESSAGE (OUTPATIENT)
Dept: HEMATOLOGY/ONCOLOGY | Facility: CLINIC | Age: 71
End: 2021-03-26

## 2021-03-26 DIAGNOSIS — Z51.89 ENCOUNTER FOR BLOOD TRANSFUSION: ICD-10-CM

## 2021-03-26 DIAGNOSIS — D64.9 ANEMIA REQUIRING TRANSFUSIONS: Primary | ICD-10-CM

## 2021-03-26 DIAGNOSIS — D64.9 ANEMIA REQUIRING TRANSFUSIONS: ICD-10-CM

## 2021-03-26 DIAGNOSIS — D46.C MDS (MYELODYSPLASTIC SYNDROME) WITH 5Q DELETION: ICD-10-CM

## 2021-03-26 LAB
ABO + RH BLD: NORMAL
BASOPHILS # BLD AUTO: 0 K/UL (ref 0–0.2)
BASOPHILS NFR BLD: 0 % (ref 0–1.9)
BLD GP AB SCN CELLS X3 SERPL QL: NORMAL
DIFFERENTIAL METHOD: ABNORMAL
EOSINOPHIL # BLD AUTO: 0.1 K/UL (ref 0–0.5)
EOSINOPHIL NFR BLD: 4.1 % (ref 0–8)
ERYTHROCYTE [DISTWIDTH] IN BLOOD BY AUTOMATED COUNT: 15.2 % (ref 11.5–14.5)
HCT VFR BLD AUTO: 20.7 % (ref 37–48.5)
HGB BLD-MCNC: 6.9 G/DL (ref 12–16)
IMM GRANULOCYTES # BLD AUTO: 0.01 K/UL (ref 0–0.04)
IMM GRANULOCYTES NFR BLD AUTO: 0.8 % (ref 0–0.5)
LYMPHOCYTES # BLD AUTO: 0.8 K/UL (ref 1–4.8)
LYMPHOCYTES NFR BLD: 67.8 % (ref 18–48)
MCH RBC QN AUTO: 29.4 PG (ref 27–31)
MCHC RBC AUTO-ENTMCNC: 33.3 G/DL (ref 32–36)
MCV RBC AUTO: 88 FL (ref 82–98)
MONOCYTES # BLD AUTO: 0.1 K/UL (ref 0.3–1)
MONOCYTES NFR BLD: 5.8 % (ref 4–15)
NEUTROPHILS # BLD AUTO: 0.3 K/UL (ref 1.8–7.7)
NEUTROPHILS NFR BLD: 21.5 % (ref 38–73)
NRBC BLD-RTO: 0 /100 WBC
PLATELET # BLD AUTO: 85 K/UL (ref 150–350)
PMV BLD AUTO: 11.2 FL (ref 9.2–12.9)
RBC # BLD AUTO: 2.35 M/UL (ref 4–5.4)
WBC # BLD AUTO: 1.21 K/UL (ref 3.9–12.7)

## 2021-03-26 PROCEDURE — 86922 COMPATIBILITY TEST ANTIGLOB: CPT | Performed by: NURSE PRACTITIONER

## 2021-03-26 PROCEDURE — 86900 BLOOD TYPING SEROLOGIC ABO: CPT | Performed by: INTERNAL MEDICINE

## 2021-03-26 PROCEDURE — 85025 COMPLETE CBC W/AUTO DIFF WBC: CPT | Performed by: INTERNAL MEDICINE

## 2021-03-26 RX ORDER — DIPHENHYDRAMINE HCL 25 MG
25 CAPSULE ORAL
Status: CANCELLED | OUTPATIENT
Start: 2021-03-26

## 2021-03-26 RX ORDER — HYDROCODONE BITARTRATE AND ACETAMINOPHEN 500; 5 MG/1; MG/1
TABLET ORAL ONCE
Status: CANCELLED | OUTPATIENT
Start: 2021-03-26 | End: 2021-03-26

## 2021-03-26 RX ORDER — ACETAMINOPHEN 325 MG/1
650 TABLET ORAL
Status: CANCELLED | OUTPATIENT
Start: 2021-03-26

## 2021-03-27 ENCOUNTER — INFUSION (OUTPATIENT)
Dept: INFUSION THERAPY | Facility: HOSPITAL | Age: 71
End: 2021-03-27
Attending: NURSE PRACTITIONER
Payer: MEDICARE

## 2021-03-27 VITALS
HEART RATE: 67 BPM | DIASTOLIC BLOOD PRESSURE: 61 MMHG | OXYGEN SATURATION: 97 % | TEMPERATURE: 98 F | SYSTOLIC BLOOD PRESSURE: 125 MMHG | RESPIRATION RATE: 18 BRPM

## 2021-03-27 DIAGNOSIS — D64.9 ANEMIA REQUIRING TRANSFUSIONS: Primary | ICD-10-CM

## 2021-03-27 DIAGNOSIS — D46.9 MDS (MYELODYSPLASTIC SYNDROME): ICD-10-CM

## 2021-03-27 PROCEDURE — 63600175 PHARM REV CODE 636 W HCPCS: Performed by: INTERNAL MEDICINE

## 2021-03-27 PROCEDURE — 25000003 PHARM REV CODE 250: Performed by: INTERNAL MEDICINE

## 2021-03-27 PROCEDURE — 86644 CMV ANTIBODY: CPT | Performed by: NURSE PRACTITIONER

## 2021-03-27 PROCEDURE — A4216 STERILE WATER/SALINE, 10 ML: HCPCS | Performed by: INTERNAL MEDICINE

## 2021-03-27 PROCEDURE — P9040 RBC LEUKOREDUCED IRRADIATED: HCPCS | Performed by: NURSE PRACTITIONER

## 2021-03-27 PROCEDURE — 25000003 PHARM REV CODE 250: Performed by: NURSE PRACTITIONER

## 2021-03-27 PROCEDURE — 36430 TRANSFUSION BLD/BLD COMPNT: CPT

## 2021-03-27 RX ORDER — ACETAMINOPHEN 325 MG/1
650 TABLET ORAL
Status: COMPLETED | OUTPATIENT
Start: 2021-03-27 | End: 2021-03-27

## 2021-03-27 RX ORDER — HYDROCODONE BITARTRATE AND ACETAMINOPHEN 500; 5 MG/1; MG/1
TABLET ORAL ONCE
Status: COMPLETED | OUTPATIENT
Start: 2021-03-27 | End: 2021-03-27

## 2021-03-27 RX ORDER — SODIUM CHLORIDE 0.9 % (FLUSH) 0.9 %
10 SYRINGE (ML) INJECTION
Status: CANCELLED | OUTPATIENT
Start: 2021-03-27

## 2021-03-27 RX ORDER — DIPHENHYDRAMINE HCL 25 MG
25 CAPSULE ORAL
Status: COMPLETED | OUTPATIENT
Start: 2021-03-27 | End: 2021-03-27

## 2021-03-27 RX ORDER — HEPARIN 100 UNIT/ML
500 SYRINGE INTRAVENOUS
Status: COMPLETED | OUTPATIENT
Start: 2021-03-27 | End: 2021-03-27

## 2021-03-27 RX ORDER — SODIUM CHLORIDE 0.9 % (FLUSH) 0.9 %
10 SYRINGE (ML) INJECTION
Status: COMPLETED | OUTPATIENT
Start: 2021-03-27 | End: 2021-03-27

## 2021-03-27 RX ORDER — HEPARIN 100 UNIT/ML
500 SYRINGE INTRAVENOUS
Status: CANCELLED | OUTPATIENT
Start: 2021-03-27

## 2021-03-27 RX ADMIN — DIPHENHYDRAMINE HYDROCHLORIDE 25 MG: 25 CAPSULE ORAL at 08:03

## 2021-03-27 RX ADMIN — ACETAMINOPHEN 650 MG: 325 TABLET ORAL at 08:03

## 2021-03-27 RX ADMIN — HEPARIN 500 UNITS: 100 SYRINGE at 10:03

## 2021-03-27 RX ADMIN — SODIUM CHLORIDE: 0.9 INJECTION, SOLUTION INTRAVENOUS at 08:03

## 2021-03-27 RX ADMIN — Medication 10 ML: at 10:03

## 2021-03-31 ENCOUNTER — TELEPHONE (OUTPATIENT)
Dept: HEMATOLOGY/ONCOLOGY | Facility: CLINIC | Age: 71
End: 2021-03-31

## 2021-03-31 ENCOUNTER — LAB VISIT (OUTPATIENT)
Dept: LAB | Facility: HOSPITAL | Age: 71
End: 2021-03-31
Attending: INTERNAL MEDICINE
Payer: MEDICARE

## 2021-03-31 DIAGNOSIS — D64.9 ANEMIA REQUIRING TRANSFUSIONS: ICD-10-CM

## 2021-03-31 DIAGNOSIS — Z51.89 ENCOUNTER FOR BLOOD TRANSFUSION: ICD-10-CM

## 2021-03-31 DIAGNOSIS — D46.C MDS (MYELODYSPLASTIC SYNDROME) WITH 5Q DELETION: ICD-10-CM

## 2021-03-31 LAB
ABO + RH BLD: NORMAL
ANISOCYTOSIS BLD QL SMEAR: SLIGHT
BASOPHILS # BLD AUTO: 0.02 K/UL (ref 0–0.2)
BASOPHILS NFR BLD: 1.9 % (ref 0–1.9)
BLD GP AB SCN CELLS X3 SERPL QL: NORMAL
DIFFERENTIAL METHOD: ABNORMAL
EOSINOPHIL # BLD AUTO: 0.1 K/UL (ref 0–0.5)
EOSINOPHIL NFR BLD: 4.6 % (ref 0–8)
ERYTHROCYTE [DISTWIDTH] IN BLOOD BY AUTOMATED COUNT: 14.8 % (ref 11.5–14.5)
HCT VFR BLD AUTO: 23 % (ref 37–48.5)
HGB BLD-MCNC: 7.7 G/DL (ref 12–16)
IMM GRANULOCYTES # BLD AUTO: 0.01 K/UL (ref 0–0.04)
IMM GRANULOCYTES NFR BLD AUTO: 0.9 % (ref 0–0.5)
LYMPHOCYTES # BLD AUTO: 0.7 K/UL (ref 1–4.8)
LYMPHOCYTES NFR BLD: 64.8 % (ref 18–48)
MCH RBC QN AUTO: 30.3 PG (ref 27–31)
MCHC RBC AUTO-ENTMCNC: 33.5 G/DL (ref 32–36)
MCV RBC AUTO: 91 FL (ref 82–98)
MONOCYTES # BLD AUTO: 0 K/UL (ref 0.3–1)
MONOCYTES NFR BLD: 3.7 % (ref 4–15)
NEUTROPHILS # BLD AUTO: 0.3 K/UL (ref 1.8–7.7)
NEUTROPHILS NFR BLD: 24.1 % (ref 38–73)
NRBC BLD-RTO: 0 /100 WBC
PLATELET # BLD AUTO: 174 K/UL (ref 150–450)
PLATELET BLD QL SMEAR: ABNORMAL
PMV BLD AUTO: 11.5 FL (ref 9.2–12.9)
RBC # BLD AUTO: 2.54 M/UL (ref 4–5.4)
WBC # BLD AUTO: 1.08 K/UL (ref 3.9–12.7)

## 2021-03-31 PROCEDURE — 85025 COMPLETE CBC W/AUTO DIFF WBC: CPT | Performed by: INTERNAL MEDICINE

## 2021-03-31 PROCEDURE — 36415 COLL VENOUS BLD VENIPUNCTURE: CPT | Performed by: INTERNAL MEDICINE

## 2021-03-31 PROCEDURE — 86900 BLOOD TYPING SEROLOGIC ABO: CPT | Performed by: INTERNAL MEDICINE

## 2021-04-01 ENCOUNTER — PES CALL (OUTPATIENT)
Dept: ADMINISTRATIVE | Facility: CLINIC | Age: 71
End: 2021-04-01

## 2021-04-01 DIAGNOSIS — E11.9 CONTROLLED TYPE 2 DIABETES MELLITUS WITHOUT COMPLICATION, WITHOUT LONG-TERM CURRENT USE OF INSULIN: ICD-10-CM

## 2021-04-01 RX ORDER — METFORMIN HYDROCHLORIDE 500 MG/1
500 TABLET, EXTENDED RELEASE ORAL
Qty: 90 TABLET | Refills: 0 | Status: ON HOLD | OUTPATIENT
Start: 2021-04-01 | End: 2022-01-01

## 2021-04-05 ENCOUNTER — OFFICE VISIT (OUTPATIENT)
Dept: HEMATOLOGY/ONCOLOGY | Facility: CLINIC | Age: 71
End: 2021-04-05
Payer: MEDICARE

## 2021-04-05 ENCOUNTER — INFUSION (OUTPATIENT)
Dept: INFUSION THERAPY | Facility: HOSPITAL | Age: 71
End: 2021-04-05
Attending: INTERNAL MEDICINE
Payer: MEDICARE

## 2021-04-05 ENCOUNTER — PATIENT MESSAGE (OUTPATIENT)
Dept: ADMINISTRATIVE | Facility: HOSPITAL | Age: 71
End: 2021-04-05

## 2021-04-05 ENCOUNTER — TELEPHONE (OUTPATIENT)
Dept: HEMATOLOGY/ONCOLOGY | Facility: CLINIC | Age: 71
End: 2021-04-05

## 2021-04-05 ENCOUNTER — LAB VISIT (OUTPATIENT)
Dept: LAB | Facility: HOSPITAL | Age: 71
End: 2021-04-05
Attending: INTERNAL MEDICINE
Payer: MEDICARE

## 2021-04-05 VITALS
SYSTOLIC BLOOD PRESSURE: 171 MMHG | DIASTOLIC BLOOD PRESSURE: 76 MMHG | WEIGHT: 171.63 LBS | RESPIRATION RATE: 16 BRPM | HEART RATE: 65 BPM | OXYGEN SATURATION: 98 % | TEMPERATURE: 98 F | BODY MASS INDEX: 28.56 KG/M2

## 2021-04-05 DIAGNOSIS — E11.9 TYPE 2 DIABETES MELLITUS WITHOUT COMPLICATION, UNSPECIFIED WHETHER LONG TERM INSULIN USE: ICD-10-CM

## 2021-04-05 DIAGNOSIS — F43.23 ADJUSTMENT DISORDER WITH MIXED ANXIETY AND DEPRESSED MOOD: ICD-10-CM

## 2021-04-05 DIAGNOSIS — D46.C MDS (MYELODYSPLASTIC SYNDROME) WITH 5Q DELETION: Primary | ICD-10-CM

## 2021-04-05 DIAGNOSIS — E87.6 HYPOKALEMIA: ICD-10-CM

## 2021-04-05 DIAGNOSIS — I25.10 CORONARY ARTERY DISEASE, ANGINA PRESENCE UNSPECIFIED, UNSPECIFIED VESSEL OR LESION TYPE, UNSPECIFIED WHETHER NATIVE OR TRANSPLANTED HEART: ICD-10-CM

## 2021-04-05 DIAGNOSIS — I10 ESSENTIAL HYPERTENSION: ICD-10-CM

## 2021-04-05 DIAGNOSIS — M54.31 BILATERAL SCIATICA: ICD-10-CM

## 2021-04-05 DIAGNOSIS — D64.9 ANEMIA REQUIRING TRANSFUSIONS: ICD-10-CM

## 2021-04-05 DIAGNOSIS — D61.818 PANCYTOPENIA: ICD-10-CM

## 2021-04-05 DIAGNOSIS — I25.10 CORONARY ARTERY DISEASE INVOLVING NATIVE CORONARY ARTERY OF NATIVE HEART WITHOUT ANGINA PECTORIS: ICD-10-CM

## 2021-04-05 DIAGNOSIS — G47.00 INSOMNIA, UNSPECIFIED TYPE: ICD-10-CM

## 2021-04-05 DIAGNOSIS — Z51.11 CHEMOTHERAPY MANAGEMENT, ENCOUNTER FOR: ICD-10-CM

## 2021-04-05 DIAGNOSIS — Z51.89 ENCOUNTER FOR BLOOD TRANSFUSION: ICD-10-CM

## 2021-04-05 DIAGNOSIS — M79.10 MYALGIA: ICD-10-CM

## 2021-04-05 DIAGNOSIS — M54.32 BILATERAL SCIATICA: ICD-10-CM

## 2021-04-05 DIAGNOSIS — D46.C MDS (MYELODYSPLASTIC SYNDROME) WITH 5Q DELETION: ICD-10-CM

## 2021-04-05 DIAGNOSIS — D70.8 OTHER NEUTROPENIA: ICD-10-CM

## 2021-04-05 LAB
ABO + RH BLD: NORMAL
ALBUMIN SERPL BCP-MCNC: 3.4 G/DL (ref 3.5–5.2)
ALP SERPL-CCNC: 61 U/L (ref 55–135)
ALT SERPL W/O P-5'-P-CCNC: 59 U/L (ref 10–44)
ANION GAP SERPL CALC-SCNC: 8 MMOL/L (ref 8–16)
ANISOCYTOSIS BLD QL SMEAR: SLIGHT
AST SERPL-CCNC: 37 U/L (ref 10–40)
BASO STIPL BLD QL SMEAR: ABNORMAL
BASOPHILS # BLD AUTO: 0.01 K/UL (ref 0–0.2)
BASOPHILS NFR BLD: 0.5 % (ref 0–1.9)
BILIRUB SERPL-MCNC: 0.4 MG/DL (ref 0.1–1)
BLD GP AB SCN CELLS X3 SERPL QL: NORMAL
BUN SERPL-MCNC: 11 MG/DL (ref 8–23)
CALCIUM SERPL-MCNC: 8.9 MG/DL (ref 8.7–10.5)
CHLORIDE SERPL-SCNC: 104 MMOL/L (ref 95–110)
CO2 SERPL-SCNC: 28 MMOL/L (ref 23–29)
CREAT SERPL-MCNC: 0.8 MG/DL (ref 0.5–1.4)
DIFFERENTIAL METHOD: ABNORMAL
EOSINOPHIL # BLD AUTO: 0.1 K/UL (ref 0–0.5)
EOSINOPHIL NFR BLD: 5.5 % (ref 0–8)
ERYTHROCYTE [DISTWIDTH] IN BLOOD BY AUTOMATED COUNT: 14.9 % (ref 11.5–14.5)
EST. GFR  (AFRICAN AMERICAN): >60 ML/MIN/1.73 M^2
EST. GFR  (NON AFRICAN AMERICAN): >60 ML/MIN/1.73 M^2
GLUCOSE SERPL-MCNC: 105 MG/DL (ref 70–110)
HCT VFR BLD AUTO: 21.3 % (ref 37–48.5)
HGB BLD-MCNC: 7.3 G/DL (ref 12–16)
IMM GRANULOCYTES # BLD AUTO: 0.01 K/UL (ref 0–0.04)
IMM GRANULOCYTES NFR BLD AUTO: 0.5 % (ref 0–0.5)
LYMPHOCYTES # BLD AUTO: 0.9 K/UL (ref 1–4.8)
LYMPHOCYTES NFR BLD: 46.7 % (ref 18–48)
MCH RBC QN AUTO: 31.1 PG (ref 27–31)
MCHC RBC AUTO-ENTMCNC: 34.3 G/DL (ref 32–36)
MCV RBC AUTO: 91 FL (ref 82–98)
MONOCYTES # BLD AUTO: 0.1 K/UL (ref 0.3–1)
MONOCYTES NFR BLD: 6.6 % (ref 4–15)
NEUTROPHILS # BLD AUTO: 0.7 K/UL (ref 1.8–7.7)
NEUTROPHILS NFR BLD: 40.2 % (ref 38–73)
NRBC BLD-RTO: 0 /100 WBC
PLATELET # BLD AUTO: 202 K/UL (ref 150–450)
PLATELET BLD QL SMEAR: ABNORMAL
PMV BLD AUTO: 11.3 FL (ref 9.2–12.9)
POLYCHROMASIA BLD QL SMEAR: ABNORMAL
POTASSIUM SERPL-SCNC: 4.3 MMOL/L (ref 3.5–5.1)
PROT SERPL-MCNC: 6.8 G/DL (ref 6–8.4)
RBC # BLD AUTO: 2.35 M/UL (ref 4–5.4)
SODIUM SERPL-SCNC: 140 MMOL/L (ref 136–145)
WBC # BLD AUTO: 1.82 K/UL (ref 3.9–12.7)

## 2021-04-05 PROCEDURE — 1101F PR PT FALLS ASSESS DOC 0-1 FALLS W/OUT INJ PAST YR: ICD-10-PCS | Mod: CPTII,S$GLB,, | Performed by: NURSE PRACTITIONER

## 2021-04-05 PROCEDURE — 1157F PR ADVANCE CARE PLAN OR EQUIV PRESENT IN MEDICAL RECORD: ICD-10-PCS | Mod: S$GLB,,, | Performed by: NURSE PRACTITIONER

## 2021-04-05 PROCEDURE — 1125F PR PAIN SEVERITY QUANTIFIED, PAIN PRESENT: ICD-10-PCS | Mod: S$GLB,,, | Performed by: NURSE PRACTITIONER

## 2021-04-05 PROCEDURE — 1157F ADVNC CARE PLAN IN RCRD: CPT | Mod: S$GLB,,, | Performed by: NURSE PRACTITIONER

## 2021-04-05 PROCEDURE — 3288F PR FALLS RISK ASSESSMENT DOCUMENTED: ICD-10-PCS | Mod: CPTII,S$GLB,, | Performed by: NURSE PRACTITIONER

## 2021-04-05 PROCEDURE — 3078F DIAST BP <80 MM HG: CPT | Mod: CPTII,S$GLB,, | Performed by: NURSE PRACTITIONER

## 2021-04-05 PROCEDURE — 96401 CHEMO ANTI-NEOPL SQ/IM: CPT

## 2021-04-05 PROCEDURE — 99499 UNLISTED E&M SERVICE: CPT | Mod: S$GLB,,, | Performed by: NURSE PRACTITIONER

## 2021-04-05 PROCEDURE — 3077F PR MOST RECENT SYSTOLIC BLOOD PRESSURE >= 140 MM HG: ICD-10-PCS | Mod: CPTII,S$GLB,, | Performed by: NURSE PRACTITIONER

## 2021-04-05 PROCEDURE — 1125F AMNT PAIN NOTED PAIN PRSNT: CPT | Mod: S$GLB,,, | Performed by: NURSE PRACTITIONER

## 2021-04-05 PROCEDURE — 1159F PR MEDICATION LIST DOCUMENTED IN MEDICAL RECORD: ICD-10-PCS | Mod: S$GLB,,, | Performed by: NURSE PRACTITIONER

## 2021-04-05 PROCEDURE — 99215 PR OFFICE/OUTPT VISIT, EST, LEVL V, 40-54 MIN: ICD-10-PCS | Mod: S$GLB,,, | Performed by: NURSE PRACTITIONER

## 2021-04-05 PROCEDURE — 99999 PR PBB SHADOW E&M-EST. PATIENT-LVL IV: ICD-10-PCS | Mod: PBBFAC,,, | Performed by: NURSE PRACTITIONER

## 2021-04-05 PROCEDURE — 3008F BODY MASS INDEX DOCD: CPT | Mod: CPTII,S$GLB,, | Performed by: NURSE PRACTITIONER

## 2021-04-05 PROCEDURE — 63600175 PHARM REV CODE 636 W HCPCS: Mod: JW,JG | Performed by: INTERNAL MEDICINE

## 2021-04-05 PROCEDURE — 1101F PT FALLS ASSESS-DOCD LE1/YR: CPT | Mod: CPTII,S$GLB,, | Performed by: NURSE PRACTITIONER

## 2021-04-05 PROCEDURE — 3008F PR BODY MASS INDEX (BMI) DOCUMENTED: ICD-10-PCS | Mod: CPTII,S$GLB,, | Performed by: NURSE PRACTITIONER

## 2021-04-05 PROCEDURE — 1159F MED LIST DOCD IN RCRD: CPT | Mod: S$GLB,,, | Performed by: NURSE PRACTITIONER

## 2021-04-05 PROCEDURE — 3077F SYST BP >= 140 MM HG: CPT | Mod: CPTII,S$GLB,, | Performed by: NURSE PRACTITIONER

## 2021-04-05 PROCEDURE — 99499 RISK ADDL DX/OHS AUDIT: ICD-10-PCS | Mod: S$GLB,,, | Performed by: NURSE PRACTITIONER

## 2021-04-05 PROCEDURE — 86900 BLOOD TYPING SEROLOGIC ABO: CPT | Performed by: INTERNAL MEDICINE

## 2021-04-05 PROCEDURE — 3288F FALL RISK ASSESSMENT DOCD: CPT | Mod: CPTII,S$GLB,, | Performed by: NURSE PRACTITIONER

## 2021-04-05 PROCEDURE — 80053 COMPREHEN METABOLIC PANEL: CPT | Performed by: INTERNAL MEDICINE

## 2021-04-05 PROCEDURE — 99999 PR PBB SHADOW E&M-EST. PATIENT-LVL IV: CPT | Mod: PBBFAC,,, | Performed by: NURSE PRACTITIONER

## 2021-04-05 PROCEDURE — 85025 COMPLETE CBC W/AUTO DIFF WBC: CPT | Performed by: INTERNAL MEDICINE

## 2021-04-05 PROCEDURE — 3078F PR MOST RECENT DIASTOLIC BLOOD PRESSURE < 80 MM HG: ICD-10-PCS | Mod: CPTII,S$GLB,, | Performed by: NURSE PRACTITIONER

## 2021-04-05 PROCEDURE — 99215 OFFICE O/P EST HI 40 MIN: CPT | Mod: S$GLB,,, | Performed by: NURSE PRACTITIONER

## 2021-04-05 RX ORDER — ONDANSETRON 8 MG/1
8 TABLET, ORALLY DISINTEGRATING ORAL ONCE
Status: CANCELLED | OUTPATIENT
Start: 2021-04-05 | End: 2021-04-05

## 2021-04-05 RX ORDER — ONDANSETRON 8 MG/1
8 TABLET, ORALLY DISINTEGRATING ORAL ONCE
Status: CANCELLED | OUTPATIENT
Start: 2021-04-06 | End: 2021-04-06

## 2021-04-05 RX ORDER — ONDANSETRON 8 MG/1
8 TABLET, ORALLY DISINTEGRATING ORAL ONCE
Status: CANCELLED | OUTPATIENT
Start: 2021-04-07 | End: 2021-04-07

## 2021-04-05 RX ORDER — ONDANSETRON 8 MG/1
8 TABLET, ORALLY DISINTEGRATING ORAL ONCE
Status: CANCELLED | OUTPATIENT
Start: 2021-04-09 | End: 2021-04-09

## 2021-04-05 RX ORDER — AZACITIDINE 100 MG/1
75 INJECTION, POWDER, LYOPHILIZED, FOR SOLUTION INTRAVENOUS; SUBCUTANEOUS
Status: CANCELLED | OUTPATIENT
Start: 2021-04-09

## 2021-04-05 RX ORDER — LISINOPRIL AND HYDROCHLOROTHIAZIDE 12.5; 2 MG/1; MG/1
2 TABLET ORAL DAILY
Qty: 180 TABLET | Refills: 3 | Status: SHIPPED | OUTPATIENT
Start: 2021-04-05 | End: 2021-01-01 | Stop reason: SDUPTHER

## 2021-04-05 RX ORDER — AZACITIDINE 100 MG/1
75 INJECTION, POWDER, LYOPHILIZED, FOR SOLUTION INTRAVENOUS; SUBCUTANEOUS
Status: COMPLETED | OUTPATIENT
Start: 2021-04-05 | End: 2021-04-05

## 2021-04-05 RX ORDER — FLUCONAZOLE 200 MG/1
400 TABLET ORAL DAILY
Qty: 60 TABLET | Refills: 6 | Status: SHIPPED | OUTPATIENT
Start: 2021-04-05 | End: 2022-01-01 | Stop reason: SDUPTHER

## 2021-04-05 RX ORDER — ONDANSETRON 8 MG/1
8 TABLET, ORALLY DISINTEGRATING ORAL ONCE
Status: CANCELLED | OUTPATIENT
Start: 2021-04-08 | End: 2021-04-08

## 2021-04-05 RX ORDER — ONDANSETRON 4 MG/1
8 TABLET, ORALLY DISINTEGRATING ORAL ONCE
Status: DISCONTINUED | OUTPATIENT
Start: 2021-04-05 | End: 2021-04-05 | Stop reason: HOSPADM

## 2021-04-05 RX ORDER — AZACITIDINE 100 MG/1
75 INJECTION, POWDER, LYOPHILIZED, FOR SOLUTION INTRAVENOUS; SUBCUTANEOUS
Status: CANCELLED | OUTPATIENT
Start: 2021-04-06

## 2021-04-05 RX ORDER — AZACITIDINE 100 MG/1
75 INJECTION, POWDER, LYOPHILIZED, FOR SOLUTION INTRAVENOUS; SUBCUTANEOUS
Status: CANCELLED | OUTPATIENT
Start: 2021-04-07

## 2021-04-05 RX ORDER — AZACITIDINE 100 MG/1
75 INJECTION, POWDER, LYOPHILIZED, FOR SOLUTION INTRAVENOUS; SUBCUTANEOUS
Status: CANCELLED | OUTPATIENT
Start: 2021-04-08

## 2021-04-05 RX ORDER — AZACITIDINE 100 MG/1
75 INJECTION, POWDER, LYOPHILIZED, FOR SOLUTION INTRAVENOUS; SUBCUTANEOUS
Status: CANCELLED | OUTPATIENT
Start: 2021-04-05

## 2021-04-05 RX ADMIN — AZACITIDINE 145 MG: 100 INJECTION, POWDER, LYOPHILIZED, FOR SOLUTION INTRAVENOUS; SUBCUTANEOUS at 02:04

## 2021-04-06 ENCOUNTER — INFUSION (OUTPATIENT)
Dept: INFUSION THERAPY | Facility: HOSPITAL | Age: 71
End: 2021-04-06
Payer: MEDICARE

## 2021-04-06 VITALS
TEMPERATURE: 98 F | DIASTOLIC BLOOD PRESSURE: 65 MMHG | RESPIRATION RATE: 18 BRPM | HEART RATE: 65 BPM | SYSTOLIC BLOOD PRESSURE: 143 MMHG

## 2021-04-06 DIAGNOSIS — D46.C MDS (MYELODYSPLASTIC SYNDROME) WITH 5Q DELETION: Primary | ICD-10-CM

## 2021-04-06 PROCEDURE — 63600175 PHARM REV CODE 636 W HCPCS: Mod: JG | Performed by: INTERNAL MEDICINE

## 2021-04-06 PROCEDURE — 96401 CHEMO ANTI-NEOPL SQ/IM: CPT

## 2021-04-06 RX ORDER — AZACITIDINE 100 MG/1
75 INJECTION, POWDER, LYOPHILIZED, FOR SOLUTION INTRAVENOUS; SUBCUTANEOUS
Status: COMPLETED | OUTPATIENT
Start: 2021-04-06 | End: 2021-04-06

## 2021-04-06 RX ADMIN — AZACITIDINE 145 MG: 100 INJECTION, POWDER, LYOPHILIZED, FOR SOLUTION INTRAVENOUS; SUBCUTANEOUS at 01:04

## 2021-04-07 ENCOUNTER — INFUSION (OUTPATIENT)
Dept: INFUSION THERAPY | Facility: HOSPITAL | Age: 71
End: 2021-04-07
Attending: NURSE PRACTITIONER
Payer: MEDICARE

## 2021-04-07 DIAGNOSIS — D46.C MDS (MYELODYSPLASTIC SYNDROME) WITH 5Q DELETION: Primary | ICD-10-CM

## 2021-04-07 PROCEDURE — 63600175 PHARM REV CODE 636 W HCPCS: Mod: JG | Performed by: INTERNAL MEDICINE

## 2021-04-07 PROCEDURE — 96401 CHEMO ANTI-NEOPL SQ/IM: CPT

## 2021-04-07 RX ORDER — AZACITIDINE 100 MG/1
75 INJECTION, POWDER, LYOPHILIZED, FOR SOLUTION INTRAVENOUS; SUBCUTANEOUS
Status: COMPLETED | OUTPATIENT
Start: 2021-04-07 | End: 2021-04-07

## 2021-04-07 RX ADMIN — AZACITIDINE 145 MG: 100 INJECTION, POWDER, LYOPHILIZED, FOR SOLUTION INTRAVENOUS; SUBCUTANEOUS at 02:04

## 2021-04-08 ENCOUNTER — LAB VISIT (OUTPATIENT)
Dept: LAB | Facility: HOSPITAL | Age: 71
End: 2021-04-08
Attending: INTERNAL MEDICINE
Payer: MEDICARE

## 2021-04-08 ENCOUNTER — INFUSION (OUTPATIENT)
Dept: INFUSION THERAPY | Facility: HOSPITAL | Age: 71
End: 2021-04-08
Attending: INTERNAL MEDICINE
Payer: MEDICARE

## 2021-04-08 ENCOUNTER — PATIENT MESSAGE (OUTPATIENT)
Dept: ENDOSCOPY | Facility: HOSPITAL | Age: 71
End: 2021-04-08

## 2021-04-08 VITALS — SYSTOLIC BLOOD PRESSURE: 141 MMHG | DIASTOLIC BLOOD PRESSURE: 82 MMHG | RESPIRATION RATE: 18 BRPM | HEART RATE: 73 BPM

## 2021-04-08 DIAGNOSIS — D46.C MDS (MYELODYSPLASTIC SYNDROME) WITH 5Q DELETION: ICD-10-CM

## 2021-04-08 DIAGNOSIS — D64.9 ANEMIA REQUIRING TRANSFUSIONS: Primary | ICD-10-CM

## 2021-04-08 DIAGNOSIS — D64.9 ANEMIA REQUIRING TRANSFUSIONS: ICD-10-CM

## 2021-04-08 DIAGNOSIS — D46.C MDS (MYELODYSPLASTIC SYNDROME) WITH 5Q DELETION: Primary | ICD-10-CM

## 2021-04-08 DIAGNOSIS — Z51.89 ENCOUNTER FOR BLOOD TRANSFUSION: ICD-10-CM

## 2021-04-08 DIAGNOSIS — Z51.11 CHEMOTHERAPY MANAGEMENT, ENCOUNTER FOR: ICD-10-CM

## 2021-04-08 LAB
ABO + RH BLD: NORMAL
ALBUMIN SERPL BCP-MCNC: 3.6 G/DL (ref 3.5–5.2)
ALP SERPL-CCNC: 63 U/L (ref 55–135)
ALT SERPL W/O P-5'-P-CCNC: 49 U/L (ref 10–44)
ANION GAP SERPL CALC-SCNC: 8 MMOL/L (ref 8–16)
ANISOCYTOSIS BLD QL SMEAR: SLIGHT
AST SERPL-CCNC: 31 U/L (ref 10–40)
BASOPHILS # BLD AUTO: ABNORMAL K/UL (ref 0–0.2)
BASOPHILS NFR BLD: 0 % (ref 0–1.9)
BILIRUB SERPL-MCNC: 0.4 MG/DL (ref 0.1–1)
BLD GP AB SCN CELLS X3 SERPL QL: NORMAL
BUN SERPL-MCNC: 19 MG/DL (ref 8–23)
CALCIUM SERPL-MCNC: 9.7 MG/DL (ref 8.7–10.5)
CHLORIDE SERPL-SCNC: 102 MMOL/L (ref 95–110)
CO2 SERPL-SCNC: 28 MMOL/L (ref 23–29)
CREAT SERPL-MCNC: 1.2 MG/DL (ref 0.5–1.4)
DIFFERENTIAL METHOD: ABNORMAL
EOSINOPHIL # BLD AUTO: ABNORMAL K/UL (ref 0–0.5)
EOSINOPHIL NFR BLD: 2 % (ref 0–8)
ERYTHROCYTE [DISTWIDTH] IN BLOOD BY AUTOMATED COUNT: 15.9 % (ref 11.5–14.5)
EST. GFR  (AFRICAN AMERICAN): 52.9 ML/MIN/1.73 M^2
EST. GFR  (NON AFRICAN AMERICAN): 45.9 ML/MIN/1.73 M^2
GIANT PLATELETS BLD QL SMEAR: PRESENT
GLUCOSE SERPL-MCNC: 120 MG/DL (ref 70–110)
HCT VFR BLD AUTO: 22.2 % (ref 37–48.5)
HGB BLD-MCNC: 7.2 G/DL (ref 12–16)
HYPOCHROMIA BLD QL SMEAR: ABNORMAL
IMM GRANULOCYTES # BLD AUTO: ABNORMAL K/UL (ref 0–0.04)
IMM GRANULOCYTES NFR BLD AUTO: ABNORMAL % (ref 0–0.5)
LYMPHOCYTES # BLD AUTO: ABNORMAL K/UL (ref 1–4.8)
LYMPHOCYTES NFR BLD: 33 % (ref 18–48)
MCH RBC QN AUTO: 30.1 PG (ref 27–31)
MCHC RBC AUTO-ENTMCNC: 32.4 G/DL (ref 32–36)
MCV RBC AUTO: 93 FL (ref 82–98)
MONOCYTES # BLD AUTO: ABNORMAL K/UL (ref 0.3–1)
MONOCYTES NFR BLD: 4 % (ref 4–15)
NEUTROPHILS # BLD AUTO: ABNORMAL K/UL (ref 1.8–7.7)
NEUTROPHILS NFR BLD: 59 % (ref 38–73)
NEUTS BAND NFR BLD MANUAL: 2 %
NRBC BLD-RTO: 0 /100 WBC
OVALOCYTES BLD QL SMEAR: ABNORMAL
PLATELET # BLD AUTO: 206 K/UL (ref 150–450)
PMV BLD AUTO: 11.7 FL (ref 9.2–12.9)
POIKILOCYTOSIS BLD QL SMEAR: SLIGHT
POLYCHROMASIA BLD QL SMEAR: ABNORMAL
POTASSIUM SERPL-SCNC: 4.4 MMOL/L (ref 3.5–5.1)
PROT SERPL-MCNC: 7.2 G/DL (ref 6–8.4)
RBC # BLD AUTO: 2.39 M/UL (ref 4–5.4)
SODIUM SERPL-SCNC: 138 MMOL/L (ref 136–145)
SPHEROCYTES BLD QL SMEAR: ABNORMAL
WBC # BLD AUTO: 2.77 K/UL (ref 3.9–12.7)

## 2021-04-08 PROCEDURE — 80053 COMPREHEN METABOLIC PANEL: CPT | Performed by: INTERNAL MEDICINE

## 2021-04-08 PROCEDURE — 85007 BL SMEAR W/DIFF WBC COUNT: CPT | Performed by: INTERNAL MEDICINE

## 2021-04-08 PROCEDURE — 86900 BLOOD TYPING SEROLOGIC ABO: CPT | Performed by: INTERNAL MEDICINE

## 2021-04-08 PROCEDURE — 63600175 PHARM REV CODE 636 W HCPCS: Mod: JG | Performed by: INTERNAL MEDICINE

## 2021-04-08 PROCEDURE — 36415 COLL VENOUS BLD VENIPUNCTURE: CPT | Performed by: INTERNAL MEDICINE

## 2021-04-08 PROCEDURE — 96401 CHEMO ANTI-NEOPL SQ/IM: CPT

## 2021-04-08 PROCEDURE — 85027 COMPLETE CBC AUTOMATED: CPT | Performed by: INTERNAL MEDICINE

## 2021-04-08 RX ORDER — HYDROCODONE BITARTRATE AND ACETAMINOPHEN 500; 5 MG/1; MG/1
TABLET ORAL ONCE
Status: CANCELLED | OUTPATIENT
Start: 2021-04-08 | End: 2021-04-08

## 2021-04-08 RX ORDER — AZACITIDINE 100 MG/1
75 INJECTION, POWDER, LYOPHILIZED, FOR SOLUTION INTRAVENOUS; SUBCUTANEOUS
Status: COMPLETED | OUTPATIENT
Start: 2021-04-08 | End: 2021-04-08

## 2021-04-08 RX ORDER — ACETAMINOPHEN 325 MG/1
650 TABLET ORAL
Status: CANCELLED | OUTPATIENT
Start: 2021-04-08

## 2021-04-08 RX ADMIN — AZACITIDINE 145 MG: 100 INJECTION, POWDER, LYOPHILIZED, FOR SOLUTION SUBCUTANEOUS at 01:04

## 2021-04-09 ENCOUNTER — INFUSION (OUTPATIENT)
Dept: INFUSION THERAPY | Facility: HOSPITAL | Age: 71
End: 2021-04-09
Attending: NURSE PRACTITIONER
Payer: MEDICARE

## 2021-04-09 VITALS
HEART RATE: 70 BPM | RESPIRATION RATE: 18 BRPM | DIASTOLIC BLOOD PRESSURE: 63 MMHG | TEMPERATURE: 99 F | SYSTOLIC BLOOD PRESSURE: 136 MMHG

## 2021-04-09 DIAGNOSIS — E87.6 HYPOKALEMIA: ICD-10-CM

## 2021-04-09 DIAGNOSIS — D46.C MDS (MYELODYSPLASTIC SYNDROME) WITH 5Q DELETION: Primary | ICD-10-CM

## 2021-04-09 PROCEDURE — 63600175 PHARM REV CODE 636 W HCPCS: Mod: JG | Performed by: INTERNAL MEDICINE

## 2021-04-09 PROCEDURE — 96401 CHEMO ANTI-NEOPL SQ/IM: CPT

## 2021-04-09 PROCEDURE — 86922 COMPATIBILITY TEST ANTIGLOB: CPT | Performed by: NURSE PRACTITIONER

## 2021-04-09 RX ORDER — ONDANSETRON 4 MG/1
8 TABLET, ORALLY DISINTEGRATING ORAL ONCE
Status: DISCONTINUED | OUTPATIENT
Start: 2021-04-09 | End: 2021-04-09 | Stop reason: HOSPADM

## 2021-04-09 RX ORDER — AZACITIDINE 100 MG/1
75 INJECTION, POWDER, LYOPHILIZED, FOR SOLUTION INTRAVENOUS; SUBCUTANEOUS
Status: COMPLETED | OUTPATIENT
Start: 2021-04-09 | End: 2021-04-09

## 2021-04-09 RX ADMIN — AZACITIDINE 145 MG: 100 INJECTION, POWDER, LYOPHILIZED, FOR SOLUTION SUBCUTANEOUS at 02:04

## 2021-04-10 ENCOUNTER — INFUSION (OUTPATIENT)
Dept: INFUSION THERAPY | Facility: HOSPITAL | Age: 71
End: 2021-04-10
Attending: NURSE PRACTITIONER
Payer: MEDICARE

## 2021-04-10 VITALS
DIASTOLIC BLOOD PRESSURE: 72 MMHG | HEIGHT: 65 IN | WEIGHT: 171.75 LBS | TEMPERATURE: 98 F | RESPIRATION RATE: 18 BRPM | HEART RATE: 71 BPM | SYSTOLIC BLOOD PRESSURE: 177 MMHG | BODY MASS INDEX: 28.61 KG/M2

## 2021-04-10 DIAGNOSIS — D64.9 ANEMIA REQUIRING TRANSFUSIONS: ICD-10-CM

## 2021-04-10 PROCEDURE — 25000003 PHARM REV CODE 250: Performed by: NURSE PRACTITIONER

## 2021-04-10 PROCEDURE — 36430 TRANSFUSION BLD/BLD COMPNT: CPT

## 2021-04-10 PROCEDURE — P9040 RBC LEUKOREDUCED IRRADIATED: HCPCS | Performed by: NURSE PRACTITIONER

## 2021-04-10 RX ORDER — ACETAMINOPHEN 325 MG/1
650 TABLET ORAL
Status: COMPLETED | OUTPATIENT
Start: 2021-04-10 | End: 2021-04-10

## 2021-04-10 RX ORDER — HYDROCODONE BITARTRATE AND ACETAMINOPHEN 500; 5 MG/1; MG/1
TABLET ORAL ONCE
Status: DISCONTINUED | OUTPATIENT
Start: 2021-04-10 | End: 2021-04-10 | Stop reason: HOSPADM

## 2021-04-10 RX ADMIN — ACETAMINOPHEN 650 MG: 325 TABLET ORAL at 08:04

## 2021-04-12 ENCOUNTER — TELEPHONE (OUTPATIENT)
Dept: HEMATOLOGY/ONCOLOGY | Facility: CLINIC | Age: 71
End: 2021-04-12

## 2021-04-12 ENCOUNTER — PATIENT MESSAGE (OUTPATIENT)
Dept: ENDOSCOPY | Facility: HOSPITAL | Age: 71
End: 2021-04-12

## 2021-04-14 ENCOUNTER — SPECIALTY PHARMACY (OUTPATIENT)
Dept: PHARMACY | Facility: CLINIC | Age: 71
End: 2021-04-14

## 2021-04-15 ENCOUNTER — PATIENT MESSAGE (OUTPATIENT)
Dept: RESEARCH | Facility: HOSPITAL | Age: 71
End: 2021-04-15

## 2021-04-15 ENCOUNTER — LAB VISIT (OUTPATIENT)
Dept: LAB | Facility: HOSPITAL | Age: 71
End: 2021-04-15
Attending: INTERNAL MEDICINE
Payer: MEDICARE

## 2021-04-15 DIAGNOSIS — D64.9 ANEMIA REQUIRING TRANSFUSIONS: ICD-10-CM

## 2021-04-15 DIAGNOSIS — Z51.89 ENCOUNTER FOR BLOOD TRANSFUSION: ICD-10-CM

## 2021-04-15 DIAGNOSIS — Z51.11 CHEMOTHERAPY MANAGEMENT, ENCOUNTER FOR: ICD-10-CM

## 2021-04-15 DIAGNOSIS — D46.C MDS (MYELODYSPLASTIC SYNDROME) WITH 5Q DELETION: ICD-10-CM

## 2021-04-15 LAB
ABO + RH BLD: NORMAL
ALBUMIN SERPL BCP-MCNC: 3.6 G/DL (ref 3.5–5.2)
ALP SERPL-CCNC: 58 U/L (ref 55–135)
ALT SERPL W/O P-5'-P-CCNC: 44 U/L (ref 10–44)
ANION GAP SERPL CALC-SCNC: 10 MMOL/L (ref 8–16)
ANISOCYTOSIS BLD QL SMEAR: SLIGHT
AST SERPL-CCNC: 31 U/L (ref 10–40)
BASOPHILS # BLD AUTO: 0.02 K/UL (ref 0–0.2)
BASOPHILS NFR BLD: 0.8 % (ref 0–1.9)
BILIRUB SERPL-MCNC: 0.4 MG/DL (ref 0.1–1)
BLD GP AB SCN CELLS X3 SERPL QL: NORMAL
BUN SERPL-MCNC: 19 MG/DL (ref 8–23)
CALCIUM SERPL-MCNC: 9.3 MG/DL (ref 8.7–10.5)
CHLORIDE SERPL-SCNC: 104 MMOL/L (ref 95–110)
CO2 SERPL-SCNC: 26 MMOL/L (ref 23–29)
CREAT SERPL-MCNC: 1 MG/DL (ref 0.5–1.4)
DACRYOCYTES BLD QL SMEAR: ABNORMAL
DIFFERENTIAL METHOD: ABNORMAL
EOSINOPHIL # BLD AUTO: 0.1 K/UL (ref 0–0.5)
EOSINOPHIL NFR BLD: 3.2 % (ref 0–8)
ERYTHROCYTE [DISTWIDTH] IN BLOOD BY AUTOMATED COUNT: 15.7 % (ref 11.5–14.5)
EST. GFR  (AFRICAN AMERICAN): >60 ML/MIN/1.73 M^2
EST. GFR  (NON AFRICAN AMERICAN): 57.2 ML/MIN/1.73 M^2
GIANT PLATELETS BLD QL SMEAR: PRESENT
GLUCOSE SERPL-MCNC: 122 MG/DL (ref 70–110)
HCT VFR BLD AUTO: 22.9 % (ref 37–48.5)
HGB BLD-MCNC: 7.5 G/DL (ref 12–16)
HYPOCHROMIA BLD QL SMEAR: ABNORMAL
IMM GRANULOCYTES # BLD AUTO: 0.03 K/UL (ref 0–0.04)
IMM GRANULOCYTES NFR BLD AUTO: 1.2 % (ref 0–0.5)
LYMPHOCYTES # BLD AUTO: 1 K/UL (ref 1–4.8)
LYMPHOCYTES NFR BLD: 40.1 % (ref 18–48)
MCH RBC QN AUTO: 30.1 PG (ref 27–31)
MCHC RBC AUTO-ENTMCNC: 32.8 G/DL (ref 32–36)
MCV RBC AUTO: 92 FL (ref 82–98)
MONOCYTES # BLD AUTO: 0.1 K/UL (ref 0.3–1)
MONOCYTES NFR BLD: 4.9 % (ref 4–15)
NEUTROPHILS # BLD AUTO: 1.2 K/UL (ref 1.8–7.7)
NEUTROPHILS NFR BLD: 49.8 % (ref 38–73)
NRBC BLD-RTO: 0 /100 WBC
OVALOCYTES BLD QL SMEAR: ABNORMAL
PLATELET # BLD AUTO: 110 K/UL (ref 150–450)
PLATELET BLD QL SMEAR: ABNORMAL
PMV BLD AUTO: 11.6 FL (ref 9.2–12.9)
POIKILOCYTOSIS BLD QL SMEAR: SLIGHT
POLYCHROMASIA BLD QL SMEAR: ABNORMAL
POTASSIUM SERPL-SCNC: 4 MMOL/L (ref 3.5–5.1)
PROT SERPL-MCNC: 7 G/DL (ref 6–8.4)
RBC # BLD AUTO: 2.49 M/UL (ref 4–5.4)
SODIUM SERPL-SCNC: 140 MMOL/L (ref 136–145)
WBC # BLD AUTO: 2.47 K/UL (ref 3.9–12.7)

## 2021-04-15 PROCEDURE — 86900 BLOOD TYPING SEROLOGIC ABO: CPT | Performed by: INTERNAL MEDICINE

## 2021-04-15 PROCEDURE — 85025 COMPLETE CBC W/AUTO DIFF WBC: CPT | Performed by: INTERNAL MEDICINE

## 2021-04-15 PROCEDURE — 80053 COMPREHEN METABOLIC PANEL: CPT | Performed by: INTERNAL MEDICINE

## 2021-04-16 DIAGNOSIS — D64.9 ANEMIA REQUIRING TRANSFUSIONS: Primary | ICD-10-CM

## 2021-04-16 DIAGNOSIS — D46.C MDS (MYELODYSPLASTIC SYNDROME) WITH 5Q DELETION: ICD-10-CM

## 2021-04-16 RX ORDER — HYDROCODONE BITARTRATE AND ACETAMINOPHEN 500; 5 MG/1; MG/1
TABLET ORAL ONCE
Status: CANCELLED | OUTPATIENT
Start: 2021-04-16 | End: 2021-04-16

## 2021-04-16 RX ORDER — ACETAMINOPHEN 325 MG/1
650 TABLET ORAL
Status: CANCELLED | OUTPATIENT
Start: 2021-04-16

## 2021-04-17 ENCOUNTER — INFUSION (OUTPATIENT)
Dept: INFUSION THERAPY | Facility: HOSPITAL | Age: 71
End: 2021-04-17
Attending: NURSE PRACTITIONER
Payer: MEDICARE

## 2021-04-17 VITALS
OXYGEN SATURATION: 96 % | DIASTOLIC BLOOD PRESSURE: 83 MMHG | HEART RATE: 71 BPM | TEMPERATURE: 98 F | RESPIRATION RATE: 18 BRPM | SYSTOLIC BLOOD PRESSURE: 146 MMHG

## 2021-04-17 DIAGNOSIS — D64.9 ANEMIA REQUIRING TRANSFUSIONS: ICD-10-CM

## 2021-04-17 PROCEDURE — 25000003 PHARM REV CODE 250: Performed by: NURSE PRACTITIONER

## 2021-04-17 PROCEDURE — P9040 RBC LEUKOREDUCED IRRADIATED: HCPCS | Performed by: NURSE PRACTITIONER

## 2021-04-17 PROCEDURE — A4216 STERILE WATER/SALINE, 10 ML: HCPCS | Performed by: INTERNAL MEDICINE

## 2021-04-17 PROCEDURE — 63600175 PHARM REV CODE 636 W HCPCS: Performed by: INTERNAL MEDICINE

## 2021-04-17 PROCEDURE — 36430 TRANSFUSION BLD/BLD COMPNT: CPT

## 2021-04-17 PROCEDURE — 86922 COMPATIBILITY TEST ANTIGLOB: CPT | Mod: 59 | Performed by: NURSE PRACTITIONER

## 2021-04-17 PROCEDURE — 25000003 PHARM REV CODE 250: Performed by: INTERNAL MEDICINE

## 2021-04-17 RX ORDER — HYDROCODONE BITARTRATE AND ACETAMINOPHEN 500; 5 MG/1; MG/1
TABLET ORAL ONCE
Status: COMPLETED | OUTPATIENT
Start: 2021-04-17 | End: 2021-04-17

## 2021-04-17 RX ORDER — HEPARIN 100 UNIT/ML
500 SYRINGE INTRAVENOUS
Status: COMPLETED | OUTPATIENT
Start: 2021-04-17 | End: 2021-04-17

## 2021-04-17 RX ORDER — SODIUM CHLORIDE 0.9 % (FLUSH) 0.9 %
10 SYRINGE (ML) INJECTION
Status: DISCONTINUED | OUTPATIENT
Start: 2021-04-17 | End: 2021-04-17 | Stop reason: HOSPADM

## 2021-04-17 RX ORDER — ACETAMINOPHEN 325 MG/1
650 TABLET ORAL
Status: COMPLETED | OUTPATIENT
Start: 2021-04-17 | End: 2021-04-17

## 2021-04-17 RX ADMIN — ACETAMINOPHEN 650 MG: 325 TABLET ORAL at 08:04

## 2021-04-17 RX ADMIN — Medication 10 ML: at 11:04

## 2021-04-17 RX ADMIN — HEPARIN 500 UNITS: 100 SYRINGE at 11:04

## 2021-04-17 RX ADMIN — SODIUM CHLORIDE: 9 INJECTION, SOLUTION INTRAVENOUS at 09:04

## 2021-04-18 ENCOUNTER — PATIENT MESSAGE (OUTPATIENT)
Dept: ENDOSCOPY | Facility: HOSPITAL | Age: 71
End: 2021-04-18

## 2021-04-18 DIAGNOSIS — Z79.2 PROPHYLACTIC ANTIBIOTIC: ICD-10-CM

## 2021-04-18 DIAGNOSIS — D46.9 MYELODYSPLASTIC SYNDROME: ICD-10-CM

## 2021-04-19 ENCOUNTER — CLINICAL SUPPORT (OUTPATIENT)
Dept: HEMATOLOGY/ONCOLOGY | Facility: CLINIC | Age: 71
End: 2021-04-19
Payer: MEDICARE

## 2021-04-19 DIAGNOSIS — D46.C MDS (MYELODYSPLASTIC SYNDROME) WITH 5Q DELETION: Primary | ICD-10-CM

## 2021-04-19 LAB — SARS-COV-2 RDRP RESP QL NAA+PROBE: NEGATIVE

## 2021-04-19 PROCEDURE — U0002 COVID-19 LAB TEST NON-CDC: HCPCS | Performed by: INTERNAL MEDICINE

## 2021-04-19 RX ORDER — CIPROFLOXACIN 500 MG/1
500 TABLET ORAL 2 TIMES DAILY
Qty: 60 TABLET | Refills: 5 | Status: SHIPPED | OUTPATIENT
Start: 2021-04-19 | End: 2021-01-01 | Stop reason: SDUPTHER

## 2021-04-20 ENCOUNTER — TELEPHONE (OUTPATIENT)
Dept: HEMATOLOGY/ONCOLOGY | Facility: CLINIC | Age: 71
End: 2021-04-20

## 2021-04-21 ENCOUNTER — ANESTHESIA EVENT (OUTPATIENT)
Dept: ENDOSCOPY | Facility: HOSPITAL | Age: 71
End: 2021-04-21
Payer: MEDICARE

## 2021-04-21 ENCOUNTER — HOSPITAL ENCOUNTER (OUTPATIENT)
Facility: HOSPITAL | Age: 71
Discharge: HOME | End: 2021-04-21
Attending: INTERNAL MEDICINE | Admitting: INTERNAL MEDICINE
Payer: MEDICARE

## 2021-04-21 ENCOUNTER — ANESTHESIA (OUTPATIENT)
Dept: ENDOSCOPY | Facility: HOSPITAL | Age: 71
End: 2021-04-21
Payer: MEDICARE

## 2021-04-21 VITALS
RESPIRATION RATE: 14 BRPM | HEIGHT: 65 IN | HEART RATE: 65 BPM | WEIGHT: 170 LBS | TEMPERATURE: 98 F | BODY MASS INDEX: 28.32 KG/M2 | OXYGEN SATURATION: 95 % | DIASTOLIC BLOOD PRESSURE: 67 MMHG | SYSTOLIC BLOOD PRESSURE: 165 MMHG

## 2021-04-21 DIAGNOSIS — D46.C MDS (MYELODYSPLASTIC SYNDROME) WITH 5Q DELETION: ICD-10-CM

## 2021-04-21 DIAGNOSIS — D46.9 MDS (MYELODYSPLASTIC SYNDROME): Primary | ICD-10-CM

## 2021-04-21 LAB — POCT GLUCOSE: 130 MG/DL (ref 70–110)

## 2021-04-21 PROCEDURE — 88341 PR IHC OR ICC EACH ADD'L SINGLE ANTIBODY  STAINPR: ICD-10-PCS | Mod: 26,,, | Performed by: PATHOLOGY

## 2021-04-21 PROCEDURE — 38222 DX BONE MARROW BX & ASPIR: CPT | Mod: RT,,, | Performed by: NURSE PRACTITIONER

## 2021-04-21 PROCEDURE — 88311 DECALCIFY TISSUE: CPT | Performed by: PATHOLOGY

## 2021-04-21 PROCEDURE — 88189 FLOWCYTOMETRY/READ 16 & >: CPT | Mod: ,,, | Performed by: PATHOLOGY

## 2021-04-21 PROCEDURE — 88342 CHG IMMUNOCYTOCHEMISTRY: ICD-10-PCS | Mod: 26,59,, | Performed by: PATHOLOGY

## 2021-04-21 PROCEDURE — 88311 DECALCIFY TISSUE: CPT | Mod: 26,,, | Performed by: PATHOLOGY

## 2021-04-21 PROCEDURE — 88341 IMHCHEM/IMCYTCHM EA ADD ANTB: CPT | Performed by: PATHOLOGY

## 2021-04-21 PROCEDURE — 88237 TISSUE CULTURE BONE MARROW: CPT | Performed by: NURSE PRACTITIONER

## 2021-04-21 PROCEDURE — 88264 CHROMOSOME ANALYSIS 20-25: CPT | Performed by: NURSE PRACTITIONER

## 2021-04-21 PROCEDURE — E9220 PRA ENDO ANESTHESIA: ICD-10-PCS | Mod: ,,, | Performed by: NURSE ANESTHETIST, CERTIFIED REGISTERED

## 2021-04-21 PROCEDURE — 38222 PR BONE MARROW BIOPSY(IES) W/ASPIRATION(S); DIAGNOSTIC: ICD-10-PCS | Mod: RT,,, | Performed by: NURSE PRACTITIONER

## 2021-04-21 PROCEDURE — 37000008 HC ANESTHESIA 1ST 15 MINUTES: Performed by: INTERNAL MEDICINE

## 2021-04-21 PROCEDURE — 88342 IMHCHEM/IMCYTCHM 1ST ANTB: CPT | Performed by: PATHOLOGY

## 2021-04-21 PROCEDURE — 88342 IMHCHEM/IMCYTCHM 1ST ANTB: CPT | Mod: 26,59,, | Performed by: PATHOLOGY

## 2021-04-21 PROCEDURE — 85097 PR  BONE MARROW,SMEAR INTERPRETATION: ICD-10-PCS | Mod: ,,, | Performed by: PATHOLOGY

## 2021-04-21 PROCEDURE — 88305 TISSUE EXAM BY PATHOLOGIST: CPT | Mod: 26,,, | Performed by: PATHOLOGY

## 2021-04-21 PROCEDURE — 88313 SPECIAL STAINS GROUP 2: CPT | Mod: 59 | Performed by: PATHOLOGY

## 2021-04-21 PROCEDURE — 38222 DX BONE MARROW BX & ASPIR: CPT | Performed by: INTERNAL MEDICINE

## 2021-04-21 PROCEDURE — 88271 CYTOGENETICS DNA PROBE: CPT | Mod: 59 | Performed by: NURSE PRACTITIONER

## 2021-04-21 PROCEDURE — E9220 PRA ENDO ANESTHESIA: HCPCS | Mod: ,,, | Performed by: NURSE ANESTHETIST, CERTIFIED REGISTERED

## 2021-04-21 PROCEDURE — 88305 TISSUE EXAM BY PATHOLOGIST: CPT | Performed by: PATHOLOGY

## 2021-04-21 PROCEDURE — 88311 PR  DECALCIFY TISSUE: ICD-10-PCS | Mod: 26,,, | Performed by: PATHOLOGY

## 2021-04-21 PROCEDURE — 88275 CYTOGENETICS 100-300: CPT | Mod: 59 | Performed by: NURSE PRACTITIONER

## 2021-04-21 PROCEDURE — 85097 BONE MARROW INTERPRETATION: CPT | Mod: ,,, | Performed by: PATHOLOGY

## 2021-04-21 PROCEDURE — 63600175 PHARM REV CODE 636 W HCPCS: Performed by: NURSE ANESTHETIST, CERTIFIED REGISTERED

## 2021-04-21 PROCEDURE — 88313 SPECIAL STAINS GROUP 2: CPT | Mod: 26,,, | Performed by: PATHOLOGY

## 2021-04-21 PROCEDURE — 88184 FLOWCYTOMETRY/ TC 1 MARKER: CPT | Performed by: PATHOLOGY

## 2021-04-21 PROCEDURE — 88189 PR  FLOWCYTOMETRY/READ, 16 & > MARKERS: ICD-10-PCS | Mod: ,,, | Performed by: PATHOLOGY

## 2021-04-21 PROCEDURE — 37000009 HC ANESTHESIA EA ADD 15 MINS: Performed by: INTERNAL MEDICINE

## 2021-04-21 PROCEDURE — 88185 FLOWCYTOMETRY/TC ADD-ON: CPT | Performed by: PATHOLOGY

## 2021-04-21 PROCEDURE — 88313 PR  SPECIAL STAINS,GROUP II: ICD-10-PCS | Mod: 26,,, | Performed by: PATHOLOGY

## 2021-04-21 PROCEDURE — 81450 HL NEO GSAP 5-50DNA/DNA&RNA: CPT | Performed by: NURSE PRACTITIONER

## 2021-04-21 PROCEDURE — 88341 IMHCHEM/IMCYTCHM EA ADD ANTB: CPT | Mod: 26,,, | Performed by: PATHOLOGY

## 2021-04-21 PROCEDURE — 25000003 PHARM REV CODE 250: Performed by: NURSE ANESTHETIST, CERTIFIED REGISTERED

## 2021-04-21 PROCEDURE — 88305 TISSUE EXAM BY PATHOLOGIST: ICD-10-PCS | Mod: 26,,, | Performed by: PATHOLOGY

## 2021-04-21 RX ORDER — SODIUM CHLORIDE 9 MG/ML
INJECTION, SOLUTION INTRAVENOUS CONTINUOUS
Status: DISCONTINUED | OUTPATIENT
Start: 2021-04-21 | End: 2021-04-21 | Stop reason: HOSPADM

## 2021-04-21 RX ORDER — LIDOCAINE HYDROCHLORIDE 20 MG/ML
INJECTION INTRAVENOUS
Status: DISCONTINUED | OUTPATIENT
Start: 2021-04-21 | End: 2021-04-21

## 2021-04-21 RX ORDER — PROPOFOL 10 MG/ML
VIAL (ML) INTRAVENOUS CONTINUOUS PRN
Status: DISCONTINUED | OUTPATIENT
Start: 2021-04-21 | End: 2021-04-21

## 2021-04-21 RX ORDER — PROPOFOL 10 MG/ML
VIAL (ML) INTRAVENOUS
Status: DISCONTINUED | OUTPATIENT
Start: 2021-04-21 | End: 2021-04-21

## 2021-04-21 RX ADMIN — SODIUM CHLORIDE: 0.9 INJECTION, SOLUTION INTRAVENOUS at 01:04

## 2021-04-21 RX ADMIN — PROPOFOL 200 MCG/KG/MIN: 10 INJECTION, EMULSION INTRAVENOUS at 01:04

## 2021-04-21 RX ADMIN — LIDOCAINE HYDROCHLORIDE 30 MG: 20 INJECTION, SOLUTION INTRAVENOUS at 01:04

## 2021-04-21 RX ADMIN — PROPOFOL 30 MG: 10 INJECTION, EMULSION INTRAVENOUS at 01:04

## 2021-04-22 ENCOUNTER — PATIENT MESSAGE (OUTPATIENT)
Dept: HEMATOLOGY/ONCOLOGY | Facility: CLINIC | Age: 71
End: 2021-04-22

## 2021-04-23 ENCOUNTER — PATIENT MESSAGE (OUTPATIENT)
Dept: HEMATOLOGY/ONCOLOGY | Facility: CLINIC | Age: 71
End: 2021-04-23

## 2021-04-23 ENCOUNTER — LAB VISIT (OUTPATIENT)
Dept: LAB | Facility: HOSPITAL | Age: 71
End: 2021-04-23
Attending: INTERNAL MEDICINE
Payer: MEDICARE

## 2021-04-23 DIAGNOSIS — D64.9 ANEMIA REQUIRING TRANSFUSIONS: ICD-10-CM

## 2021-04-23 DIAGNOSIS — D46.C MDS (MYELODYSPLASTIC SYNDROME) WITH 5Q DELETION: ICD-10-CM

## 2021-04-23 LAB
ABO + RH BLD: NORMAL
ALBUMIN SERPL BCP-MCNC: 3.6 G/DL (ref 3.5–5.2)
ALP SERPL-CCNC: 62 U/L (ref 55–135)
ALT SERPL W/O P-5'-P-CCNC: 51 U/L (ref 10–44)
ANION GAP SERPL CALC-SCNC: 10 MMOL/L (ref 8–16)
ANISOCYTOSIS BLD QL SMEAR: SLIGHT
AST SERPL-CCNC: 35 U/L (ref 10–40)
BASOPHILS # BLD AUTO: 0.01 K/UL (ref 0–0.2)
BASOPHILS NFR BLD: 0.5 % (ref 0–1.9)
BILIRUB SERPL-MCNC: 0.6 MG/DL (ref 0.1–1)
BLD GP AB SCN CELLS X3 SERPL QL: NORMAL
BODY SITE - BONE MARROW: NORMAL
BUN SERPL-MCNC: 17 MG/DL (ref 8–23)
CALCIUM SERPL-MCNC: 9.4 MG/DL (ref 8.7–10.5)
CHLORIDE SERPL-SCNC: 103 MMOL/L (ref 95–110)
CLINICAL DIAGNOSIS - BONE MARROW: NORMAL
CO2 SERPL-SCNC: 29 MMOL/L (ref 23–29)
CREAT SERPL-MCNC: 0.9 MG/DL (ref 0.5–1.4)
DIFFERENTIAL METHOD: ABNORMAL
EOSINOPHIL # BLD AUTO: 0.1 K/UL (ref 0–0.5)
EOSINOPHIL NFR BLD: 6 % (ref 0–8)
ERYTHROCYTE [DISTWIDTH] IN BLOOD BY AUTOMATED COUNT: 15.6 % (ref 11.5–14.5)
EST. GFR  (AFRICAN AMERICAN): >60 ML/MIN/1.73 M^2
EST. GFR  (NON AFRICAN AMERICAN): >60 ML/MIN/1.73 M^2
FLOW CYTOMETRY ANTIBODIES ANALYZED - BONE MARROW: NORMAL
FLOW CYTOMETRY COMMENT - BONE MARROW: NORMAL
FLOW CYTOMETRY INTERPRETATION - BONE MARROW: NORMAL
GLUCOSE SERPL-MCNC: 110 MG/DL (ref 70–110)
HCT VFR BLD AUTO: 23.2 % (ref 37–48.5)
HGB BLD-MCNC: 7.7 G/DL (ref 12–16)
HYPOCHROMIA BLD QL SMEAR: ABNORMAL
IMM GRANULOCYTES # BLD AUTO: 0 K/UL (ref 0–0.04)
IMM GRANULOCYTES NFR BLD AUTO: 0 % (ref 0–0.5)
LYMPHOCYTES # BLD AUTO: 0.9 K/UL (ref 1–4.8)
LYMPHOCYTES NFR BLD: 47.2 % (ref 18–48)
MCH RBC QN AUTO: 30.8 PG (ref 27–31)
MCHC RBC AUTO-ENTMCNC: 33.2 G/DL (ref 32–36)
MCV RBC AUTO: 93 FL (ref 82–98)
MONOCYTES # BLD AUTO: 0.1 K/UL (ref 0.3–1)
MONOCYTES NFR BLD: 5 % (ref 4–15)
NEUTROPHILS # BLD AUTO: 0.8 K/UL (ref 1.8–7.7)
NEUTROPHILS NFR BLD: 41.3 % (ref 38–73)
NRBC BLD-RTO: 0 /100 WBC
OVALOCYTES BLD QL SMEAR: ABNORMAL
PLATELET # BLD AUTO: 106 K/UL (ref 150–450)
PLATELET BLD QL SMEAR: ABNORMAL
PMV BLD AUTO: 11.9 FL (ref 9.2–12.9)
POIKILOCYTOSIS BLD QL SMEAR: SLIGHT
POLYCHROMASIA BLD QL SMEAR: ABNORMAL
POTASSIUM SERPL-SCNC: 3.8 MMOL/L (ref 3.5–5.1)
PROT SERPL-MCNC: 7.2 G/DL (ref 6–8.4)
RBC # BLD AUTO: 2.5 M/UL (ref 4–5.4)
SODIUM SERPL-SCNC: 142 MMOL/L (ref 136–145)
WBC # BLD AUTO: 2.03 K/UL (ref 3.9–12.7)

## 2021-04-23 PROCEDURE — 36415 COLL VENOUS BLD VENIPUNCTURE: CPT | Performed by: INTERNAL MEDICINE

## 2021-04-23 PROCEDURE — 80053 COMPREHEN METABOLIC PANEL: CPT | Performed by: INTERNAL MEDICINE

## 2021-04-23 PROCEDURE — 86900 BLOOD TYPING SEROLOGIC ABO: CPT | Performed by: INTERNAL MEDICINE

## 2021-04-23 PROCEDURE — 85025 COMPLETE CBC W/AUTO DIFF WBC: CPT | Performed by: INTERNAL MEDICINE

## 2021-04-26 ENCOUNTER — LAB VISIT (OUTPATIENT)
Dept: LAB | Facility: HOSPITAL | Age: 71
End: 2021-04-26
Attending: INTERNAL MEDICINE
Payer: MEDICARE

## 2021-04-26 DIAGNOSIS — D46.C MDS (MYELODYSPLASTIC SYNDROME) WITH 5Q DELETION: ICD-10-CM

## 2021-04-26 DIAGNOSIS — D64.9 ANEMIA REQUIRING TRANSFUSIONS: ICD-10-CM

## 2021-04-26 LAB
ABO + RH BLD: NORMAL
ALBUMIN SERPL BCP-MCNC: 3.5 G/DL (ref 3.5–5.2)
ALP SERPL-CCNC: 61 U/L (ref 55–135)
ALT SERPL W/O P-5'-P-CCNC: 40 U/L (ref 10–44)
ANION GAP SERPL CALC-SCNC: 11 MMOL/L (ref 8–16)
ANISOCYTOSIS BLD QL SMEAR: SLIGHT
AST SERPL-CCNC: 27 U/L (ref 10–40)
BASOPHILS NFR BLD: 0 % (ref 0–1.9)
BILIRUB SERPL-MCNC: 0.5 MG/DL (ref 0.1–1)
BLD GP AB SCN CELLS X3 SERPL QL: NORMAL
BUN SERPL-MCNC: 16 MG/DL (ref 8–23)
CALCIUM SERPL-MCNC: 9.3 MG/DL (ref 8.7–10.5)
CHLORIDE SERPL-SCNC: 102 MMOL/L (ref 95–110)
CO2 SERPL-SCNC: 28 MMOL/L (ref 23–29)
CREAT SERPL-MCNC: 0.9 MG/DL (ref 0.5–1.4)
DIFFERENTIAL METHOD: ABNORMAL
EOSINOPHIL NFR BLD: 6 % (ref 0–8)
ERYTHROCYTE [DISTWIDTH] IN BLOOD BY AUTOMATED COUNT: 16 % (ref 11.5–14.5)
EST. GFR  (AFRICAN AMERICAN): >60 ML/MIN/1.73 M^2
EST. GFR  (NON AFRICAN AMERICAN): >60 ML/MIN/1.73 M^2
GLUCOSE SERPL-MCNC: 118 MG/DL (ref 70–110)
HCT VFR BLD AUTO: 22 % (ref 37–48.5)
HGB BLD-MCNC: 7.4 G/DL (ref 12–16)
HYPOCHROMIA BLD QL SMEAR: ABNORMAL
IMM GRANULOCYTES # BLD AUTO: ABNORMAL K/UL (ref 0–0.04)
IMM GRANULOCYTES NFR BLD AUTO: ABNORMAL % (ref 0–0.5)
LYMPHOCYTES NFR BLD: 55 % (ref 18–48)
MCH RBC QN AUTO: 31.1 PG (ref 27–31)
MCHC RBC AUTO-ENTMCNC: 33.6 G/DL (ref 32–36)
MCV RBC AUTO: 92 FL (ref 82–98)
MONOCYTES NFR BLD: 3 % (ref 4–15)
NEUTROPHILS NFR BLD: 36 % (ref 38–73)
NRBC BLD-RTO: 0 /100 WBC
PLATELET # BLD AUTO: 171 K/UL (ref 150–450)
PLATELET BLD QL SMEAR: ABNORMAL
PMV BLD AUTO: 11.4 FL (ref 9.2–12.9)
POTASSIUM SERPL-SCNC: 3.6 MMOL/L (ref 3.5–5.1)
PROT SERPL-MCNC: 7.1 G/DL (ref 6–8.4)
RBC # BLD AUTO: 2.38 M/UL (ref 4–5.4)
SODIUM SERPL-SCNC: 141 MMOL/L (ref 136–145)
WBC # BLD AUTO: 1.54 K/UL (ref 3.9–12.7)

## 2021-04-26 PROCEDURE — 85007 BL SMEAR W/DIFF WBC COUNT: CPT | Performed by: INTERNAL MEDICINE

## 2021-04-26 PROCEDURE — 86900 BLOOD TYPING SEROLOGIC ABO: CPT | Performed by: INTERNAL MEDICINE

## 2021-04-26 PROCEDURE — 85027 COMPLETE CBC AUTOMATED: CPT | Performed by: INTERNAL MEDICINE

## 2021-04-26 PROCEDURE — 80053 COMPREHEN METABOLIC PANEL: CPT | Performed by: INTERNAL MEDICINE

## 2021-04-26 PROCEDURE — 36415 COLL VENOUS BLD VENIPUNCTURE: CPT | Performed by: INTERNAL MEDICINE

## 2021-04-29 ENCOUNTER — LAB VISIT (OUTPATIENT)
Dept: LAB | Facility: HOSPITAL | Age: 71
End: 2021-04-29
Attending: INTERNAL MEDICINE
Payer: MEDICARE

## 2021-04-29 ENCOUNTER — TELEPHONE (OUTPATIENT)
Dept: HEMATOLOGY/ONCOLOGY | Facility: CLINIC | Age: 71
End: 2021-04-29

## 2021-04-29 DIAGNOSIS — Z51.11 CHEMOTHERAPY MANAGEMENT, ENCOUNTER FOR: ICD-10-CM

## 2021-04-29 DIAGNOSIS — Z51.89 ENCOUNTER FOR BLOOD TRANSFUSION: ICD-10-CM

## 2021-04-29 DIAGNOSIS — D64.9 ANEMIA REQUIRING TRANSFUSIONS: ICD-10-CM

## 2021-04-29 DIAGNOSIS — D46.C MDS (MYELODYSPLASTIC SYNDROME) WITH 5Q DELETION: ICD-10-CM

## 2021-04-29 LAB
ABO + RH BLD: NORMAL
ALBUMIN SERPL BCP-MCNC: 3.6 G/DL (ref 3.5–5.2)
ALP SERPL-CCNC: 63 U/L (ref 55–135)
ALT SERPL W/O P-5'-P-CCNC: 36 U/L (ref 10–44)
ANION GAP SERPL CALC-SCNC: 9 MMOL/L (ref 8–16)
ANISOCYTOSIS BLD QL SMEAR: SLIGHT
AST SERPL-CCNC: 27 U/L (ref 10–40)
BASOPHILS # BLD AUTO: 0.02 K/UL (ref 0–0.2)
BASOPHILS NFR BLD: 1.3 % (ref 0–1.9)
BILIRUB SERPL-MCNC: 0.3 MG/DL (ref 0.1–1)
BLD GP AB SCN CELLS X3 SERPL QL: NORMAL
BUN SERPL-MCNC: 19 MG/DL (ref 8–23)
CALCIUM SERPL-MCNC: 9.4 MG/DL (ref 8.7–10.5)
CHLORIDE SERPL-SCNC: 103 MMOL/L (ref 95–110)
CO2 SERPL-SCNC: 28 MMOL/L (ref 23–29)
CREAT SERPL-MCNC: 1 MG/DL (ref 0.5–1.4)
DIFFERENTIAL METHOD: ABNORMAL
EOSINOPHIL # BLD AUTO: 0.1 K/UL (ref 0–0.5)
EOSINOPHIL NFR BLD: 5.8 % (ref 0–8)
ERYTHROCYTE [DISTWIDTH] IN BLOOD BY AUTOMATED COUNT: 16.9 % (ref 11.5–14.5)
EST. GFR  (AFRICAN AMERICAN): >60 ML/MIN/1.73 M^2
EST. GFR  (NON AFRICAN AMERICAN): 57.2 ML/MIN/1.73 M^2
GLUCOSE SERPL-MCNC: 126 MG/DL (ref 70–110)
HCT VFR BLD AUTO: 19.7 % (ref 37–48.5)
HGB BLD-MCNC: 6.7 G/DL (ref 12–16)
HYPOCHROMIA BLD QL SMEAR: ABNORMAL
IMM GRANULOCYTES # BLD AUTO: 0.01 K/UL (ref 0–0.04)
IMM GRANULOCYTES NFR BLD AUTO: 0.6 % (ref 0–0.5)
LYMPHOCYTES # BLD AUTO: 0.8 K/UL (ref 1–4.8)
LYMPHOCYTES NFR BLD: 52.9 % (ref 18–48)
MCH RBC QN AUTO: 31.8 PG (ref 27–31)
MCHC RBC AUTO-ENTMCNC: 34 G/DL (ref 32–36)
MCV RBC AUTO: 93 FL (ref 82–98)
MONOCYTES # BLD AUTO: 0.1 K/UL (ref 0.3–1)
MONOCYTES NFR BLD: 5.8 % (ref 4–15)
NEUTROPHILS # BLD AUTO: 0.5 K/UL (ref 1.8–7.7)
NEUTROPHILS NFR BLD: 33.6 % (ref 38–73)
NRBC BLD-RTO: 0 /100 WBC
OVALOCYTES BLD QL SMEAR: ABNORMAL
PLATELET # BLD AUTO: 226 K/UL (ref 150–450)
PLATELET BLD QL SMEAR: ABNORMAL
PMV BLD AUTO: 11.2 FL (ref 9.2–12.9)
POIKILOCYTOSIS BLD QL SMEAR: SLIGHT
POLYCHROMASIA BLD QL SMEAR: ABNORMAL
POTASSIUM SERPL-SCNC: 4 MMOL/L (ref 3.5–5.1)
PROT SERPL-MCNC: 7.1 G/DL (ref 6–8.4)
RBC # BLD AUTO: 2.11 M/UL (ref 4–5.4)
SODIUM SERPL-SCNC: 140 MMOL/L (ref 136–145)
WBC # BLD AUTO: 1.55 K/UL (ref 3.9–12.7)

## 2021-04-29 PROCEDURE — 80053 COMPREHEN METABOLIC PANEL: CPT | Performed by: INTERNAL MEDICINE

## 2021-04-29 PROCEDURE — 86900 BLOOD TYPING SEROLOGIC ABO: CPT | Performed by: INTERNAL MEDICINE

## 2021-04-29 PROCEDURE — 85025 COMPLETE CBC W/AUTO DIFF WBC: CPT | Performed by: INTERNAL MEDICINE

## 2021-04-29 PROCEDURE — 36415 COLL VENOUS BLD VENIPUNCTURE: CPT | Performed by: INTERNAL MEDICINE

## 2021-04-29 RX ORDER — HYDROCODONE BITARTRATE AND ACETAMINOPHEN 500; 5 MG/1; MG/1
TABLET ORAL ONCE
Status: CANCELLED | OUTPATIENT
Start: 2021-04-29 | End: 2021-04-29

## 2021-04-29 RX ORDER — DIPHENHYDRAMINE HCL 25 MG
25 CAPSULE ORAL
Status: CANCELLED | OUTPATIENT
Start: 2021-04-29

## 2021-04-29 RX ORDER — ACETAMINOPHEN 325 MG/1
650 TABLET ORAL
Status: CANCELLED | OUTPATIENT
Start: 2021-04-29

## 2021-04-30 ENCOUNTER — PATIENT MESSAGE (OUTPATIENT)
Dept: OPTOMETRY | Facility: CLINIC | Age: 71
End: 2021-04-30

## 2021-04-30 ENCOUNTER — INFUSION (OUTPATIENT)
Dept: INFUSION THERAPY | Facility: HOSPITAL | Age: 71
End: 2021-04-30
Payer: MEDICARE

## 2021-04-30 ENCOUNTER — OFFICE VISIT (OUTPATIENT)
Dept: HEMATOLOGY/ONCOLOGY | Facility: CLINIC | Age: 71
End: 2021-04-30
Payer: MEDICARE

## 2021-04-30 VITALS
RESPIRATION RATE: 18 BRPM | TEMPERATURE: 98 F | SYSTOLIC BLOOD PRESSURE: 178 MMHG | HEART RATE: 78 BPM | DIASTOLIC BLOOD PRESSURE: 74 MMHG

## 2021-04-30 DIAGNOSIS — I10 ESSENTIAL HYPERTENSION: ICD-10-CM

## 2021-04-30 DIAGNOSIS — D46.C MDS (MYELODYSPLASTIC SYNDROME) WITH 5Q DELETION: Primary | ICD-10-CM

## 2021-04-30 DIAGNOSIS — I25.10 CORONARY ARTERY DISEASE INVOLVING NATIVE CORONARY ARTERY OF NATIVE HEART WITHOUT ANGINA PECTORIS: ICD-10-CM

## 2021-04-30 DIAGNOSIS — M79.10 MYALGIA: ICD-10-CM

## 2021-04-30 DIAGNOSIS — D64.9 ANEMIA REQUIRING TRANSFUSIONS: Primary | ICD-10-CM

## 2021-04-30 DIAGNOSIS — G47.00 INSOMNIA, UNSPECIFIED TYPE: ICD-10-CM

## 2021-04-30 DIAGNOSIS — D61.818 PANCYTOPENIA: ICD-10-CM

## 2021-04-30 DIAGNOSIS — F43.23 ADJUSTMENT DISORDER WITH MIXED ANXIETY AND DEPRESSED MOOD: ICD-10-CM

## 2021-04-30 DIAGNOSIS — E11.9 TYPE 2 DIABETES MELLITUS WITHOUT COMPLICATION, UNSPECIFIED WHETHER LONG TERM INSULIN USE: ICD-10-CM

## 2021-04-30 DIAGNOSIS — D46.9 MDS (MYELODYSPLASTIC SYNDROME): ICD-10-CM
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
## 2021-04-30 PROCEDURE — 3288F FALL RISK ASSESSMENT DOCD: CPT | Mod: CPTII,S$GLB,, | Performed by: NURSE PRACTITIONER

## 2021-04-30 PROCEDURE — P9040 RBC LEUKOREDUCED IRRADIATED: HCPCS | Performed by: NURSE PRACTITIONER

## 2021-04-30 PROCEDURE — 1101F PR PT FALLS ASSESS DOC 0-1 FALLS W/OUT INJ PAST YR: ICD-10-PCS | Mod: CPTII,S$GLB,, | Performed by: NURSE PRACTITIONER

## 2021-04-30 PROCEDURE — 1157F PR ADVANCE CARE PLAN OR EQUIV PRESENT IN MEDICAL RECORD: ICD-10-PCS | Mod: S$GLB,,, | Performed by: NURSE PRACTITIONER

## 2021-04-30 PROCEDURE — 1101F PT FALLS ASSESS-DOCD LE1/YR: CPT | Mod: CPTII,S$GLB,, | Performed by: NURSE PRACTITIONER

## 2021-04-30 PROCEDURE — 99499 RISK ADDL DX/OHS AUDIT: ICD-10-PCS | Mod: S$GLB,,, | Performed by: NURSE PRACTITIONER

## 2021-04-30 PROCEDURE — 36430 TRANSFUSION BLD/BLD COMPNT: CPT

## 2021-04-30 PROCEDURE — 25000003 PHARM REV CODE 250: Performed by: INTERNAL MEDICINE

## 2021-04-30 PROCEDURE — 99999 PR PBB SHADOW E&M-EST. PATIENT-LVL V: ICD-10-PCS | Mod: PBBFAC,,, | Performed by: NURSE PRACTITIONER

## 2021-04-30 PROCEDURE — 3008F PR BODY MASS INDEX (BMI) DOCUMENTED: ICD-10-PCS | Mod: CPTII,S$GLB,, | Performed by: NURSE PRACTITIONER

## 2021-04-30 PROCEDURE — 99215 OFFICE O/P EST HI 40 MIN: CPT | Mod: S$GLB,,, | Performed by: NURSE PRACTITIONER

## 2021-04-30 PROCEDURE — 86922 COMPATIBILITY TEST ANTIGLOB: CPT | Performed by: NURSE PRACTITIONER

## 2021-04-30 PROCEDURE — 3008F BODY MASS INDEX DOCD: CPT | Mod: CPTII,S$GLB,, | Performed by: NURSE PRACTITIONER

## 2021-04-30 PROCEDURE — 99999 PR PBB SHADOW E&M-EST. PATIENT-LVL V: CPT | Mod: PBBFAC,,, | Performed by: NURSE PRACTITIONER

## 2021-04-30 PROCEDURE — 3288F PR FALLS RISK ASSESSMENT DOCUMENTED: ICD-10-PCS | Mod: CPTII,S$GLB,, | Performed by: NURSE PRACTITIONER

## 2021-04-30 PROCEDURE — 99215 PR OFFICE/OUTPT VISIT, EST, LEVL V, 40-54 MIN: ICD-10-PCS | Mod: S$GLB,,, | Performed by: NURSE PRACTITIONER

## 2021-04-30 PROCEDURE — 1159F MED LIST DOCD IN RCRD: CPT | Mod: S$GLB,,, | Performed by: NURSE PRACTITIONER

## 2021-04-30 PROCEDURE — A4216 STERILE WATER/SALINE, 10 ML: HCPCS | Performed by: INTERNAL MEDICINE

## 2021-04-30 PROCEDURE — 63600175 PHARM REV CODE 636 W HCPCS: Performed by: INTERNAL MEDICINE

## 2021-04-30 PROCEDURE — 25000003 PHARM REV CODE 250: Performed by: NURSE PRACTITIONER

## 2021-04-30 PROCEDURE — 1157F ADVNC CARE PLAN IN RCRD: CPT | Mod: S$GLB,,, | Performed by: NURSE PRACTITIONER

## 2021-04-30 PROCEDURE — 1126F PR PAIN SEVERITY QUANTIFIED, NO PAIN PRESENT: ICD-10-PCS | Mod: S$GLB,,, | Performed by: NURSE PRACTITIONER

## 2021-04-30 PROCEDURE — 99499 UNLISTED E&M SERVICE: CPT | Mod: S$GLB,,, | Performed by: NURSE PRACTITIONER

## 2021-04-30 PROCEDURE — 1126F AMNT PAIN NOTED NONE PRSNT: CPT | Mod: S$GLB,,, | Performed by: NURSE PRACTITIONER

## 2021-04-30 PROCEDURE — 1159F PR MEDICATION LIST DOCUMENTED IN MEDICAL RECORD: ICD-10-PCS | Mod: S$GLB,,, | Performed by: NURSE PRACTITIONER

## 2021-04-30 RX ORDER — HYDROCODONE BITARTRATE AND ACETAMINOPHEN 500; 5 MG/1; MG/1
TABLET ORAL ONCE
Status: COMPLETED | OUTPATIENT
Start: 2021-04-30 | End: 2021-04-30

## 2021-04-30 RX ORDER — SODIUM CHLORIDE 0.9 % (FLUSH) 0.9 %
10 SYRINGE (ML) INJECTION
Status: COMPLETED | OUTPATIENT
Start: 2021-04-30 | End: 2021-04-30

## 2021-04-30 RX ORDER — ACETAMINOPHEN 325 MG/1
650 TABLET ORAL
Status: COMPLETED | OUTPATIENT
Start: 2021-04-30 | End: 2021-04-30

## 2021-04-30 RX ORDER — HEPARIN 100 UNIT/ML
500 SYRINGE INTRAVENOUS
Status: CANCELLED | OUTPATIENT
Start: 2021-04-30

## 2021-04-30 RX ORDER — SODIUM CHLORIDE 0.9 % (FLUSH) 0.9 %
10 SYRINGE (ML) INJECTION
Status: CANCELLED | OUTPATIENT
Start: 2021-04-30

## 2021-04-30 RX ORDER — DIPHENHYDRAMINE HCL 25 MG
25 CAPSULE ORAL
Status: COMPLETED | OUTPATIENT
Start: 2021-04-30 | End: 2021-04-30

## 2021-04-30 RX ORDER — HEPARIN 100 UNIT/ML
500 SYRINGE INTRAVENOUS
Status: COMPLETED | OUTPATIENT
Start: 2021-04-30 | End: 2021-04-30

## 2021-04-30 RX ADMIN — Medication 10 ML: at 01:04

## 2021-04-30 RX ADMIN — HEPARIN 500 UNITS: 100 SYRINGE at 01:04

## 2021-04-30 RX ADMIN — DIPHENHYDRAMINE HYDROCHLORIDE 25 MG: 25 CAPSULE ORAL at 11:04

## 2021-04-30 RX ADMIN — ACETAMINOPHEN 650 MG: 325 TABLET ORAL at 11:04

## 2021-04-30 RX ADMIN — SODIUM CHLORIDE: 0.9 INJECTION, SOLUTION INTRAVENOUS at 11:04

## 2021-05-02 ENCOUNTER — PATIENT MESSAGE (OUTPATIENT)
Dept: HEMATOLOGY/ONCOLOGY | Facility: CLINIC | Age: 71
End: 2021-05-02

## 2021-05-02 VITALS
TEMPERATURE: 98 F | RESPIRATION RATE: 17 BRPM | HEART RATE: 88 BPM | WEIGHT: 169.31 LBS | OXYGEN SATURATION: 94 % | BODY MASS INDEX: 28.21 KG/M2 | HEIGHT: 65 IN | SYSTOLIC BLOOD PRESSURE: 168 MMHG | DIASTOLIC BLOOD PRESSURE: 78 MMHG

## 2021-05-03 ENCOUNTER — INFUSION (OUTPATIENT)
Dept: INFUSION THERAPY | Facility: HOSPITAL | Age: 71
End: 2021-05-03
Payer: MEDICARE

## 2021-05-03 DIAGNOSIS — E87.6 HYPOKALEMIA: ICD-10-CM

## 2021-05-03 DIAGNOSIS — D46.C MDS (MYELODYSPLASTIC SYNDROME) WITH 5Q DELETION: Primary | ICD-10-CM

## 2021-05-03 LAB
AML FISH ADDITIONAL INFORMATION (BM): NORMAL
AML FISH DISCLAIMER (BM): NORMAL
AML FISH REASON FOR REFERRAL (BM): NORMAL
AML FISH RELEASED BY (BM): NORMAL
AML FISH RESULT (BM): NORMAL
AML FISH RESULT SUMMARY (BM): NORMAL
AML FISH RESULT TABLE (BM): NORMAL
CLINICAL CYTOGENETICIST REVIEW: NORMAL
COMMENT: NORMAL
FAMLB SPECIMEN: NORMAL
FINAL PATHOLOGIC DIAGNOSIS: NORMAL
GROSS: NORMAL
Lab: NORMAL
MICROSCOPIC EXAM: NORMAL
REF LAB TEST METHOD: NORMAL
SPECIMEN SOURCE: NORMAL
SUPPLEMENTAL DIAGNOSIS: NORMAL

## 2021-05-03 PROCEDURE — 63600175 PHARM REV CODE 636 W HCPCS: Mod: JG | Performed by: INTERNAL MEDICINE

## 2021-05-03 PROCEDURE — 25000003 PHARM REV CODE 250: Performed by: INTERNAL MEDICINE

## 2021-05-03 PROCEDURE — 96401 CHEMO ANTI-NEOPL SQ/IM: CPT

## 2021-05-03 RX ORDER — ONDANSETRON 8 MG/1
8 TABLET, ORALLY DISINTEGRATING ORAL ONCE
Status: CANCELLED | OUTPATIENT
Start: 2021-05-05 | End: 2021-05-05

## 2021-05-03 RX ORDER — ONDANSETRON 8 MG/1
8 TABLET, ORALLY DISINTEGRATING ORAL ONCE
Status: CANCELLED | OUTPATIENT
Start: 2021-05-07 | End: 2021-05-07

## 2021-05-03 RX ORDER — AZACITIDINE 100 MG/1
75 INJECTION, POWDER, LYOPHILIZED, FOR SOLUTION INTRAVENOUS; SUBCUTANEOUS
Status: COMPLETED | OUTPATIENT
Start: 2021-05-03 | End: 2021-05-03

## 2021-05-03 RX ORDER — ONDANSETRON 8 MG/1
8 TABLET, ORALLY DISINTEGRATING ORAL ONCE
Status: CANCELLED | OUTPATIENT
Start: 2021-05-04 | End: 2021-05-04

## 2021-05-03 RX ORDER — AZACITIDINE 100 MG/1
75 INJECTION, POWDER, LYOPHILIZED, FOR SOLUTION INTRAVENOUS; SUBCUTANEOUS
Status: CANCELLED | OUTPATIENT
Start: 2021-05-05

## 2021-05-03 RX ORDER — ONDANSETRON 8 MG/1
8 TABLET, ORALLY DISINTEGRATING ORAL ONCE
Status: CANCELLED | OUTPATIENT
Start: 2021-05-06 | End: 2021-05-06

## 2021-05-03 RX ORDER — AZACITIDINE 100 MG/1
75 INJECTION, POWDER, LYOPHILIZED, FOR SOLUTION INTRAVENOUS; SUBCUTANEOUS
Status: CANCELLED | OUTPATIENT
Start: 2021-05-06

## 2021-05-03 RX ORDER — ONDANSETRON 8 MG/1
8 TABLET, ORALLY DISINTEGRATING ORAL ONCE
Status: CANCELLED | OUTPATIENT
Start: 2021-05-03 | End: 2021-05-03

## 2021-05-03 RX ORDER — ONDANSETRON 4 MG/1
8 TABLET, ORALLY DISINTEGRATING ORAL ONCE
Status: COMPLETED | OUTPATIENT
Start: 2021-05-03 | End: 2021-05-03

## 2021-05-03 RX ORDER — AZACITIDINE 100 MG/1
75 INJECTION, POWDER, LYOPHILIZED, FOR SOLUTION INTRAVENOUS; SUBCUTANEOUS
Status: CANCELLED | OUTPATIENT
Start: 2021-05-07

## 2021-05-03 RX ORDER — AZACITIDINE 100 MG/1
75 INJECTION, POWDER, LYOPHILIZED, FOR SOLUTION INTRAVENOUS; SUBCUTANEOUS
Status: CANCELLED | OUTPATIENT
Start: 2021-05-04

## 2021-05-03 RX ORDER — AZACITIDINE 100 MG/1
75 INJECTION, POWDER, LYOPHILIZED, FOR SOLUTION INTRAVENOUS; SUBCUTANEOUS
Status: CANCELLED | OUTPATIENT
Start: 2021-05-03

## 2021-05-03 RX ADMIN — ONDANSETRON 8 MG: 4 TABLET, ORALLY DISINTEGRATING ORAL at 12:05

## 2021-05-03 RX ADMIN — AZACITIDINE 145 MG: 100 INJECTION, POWDER, LYOPHILIZED, FOR SOLUTION SUBCUTANEOUS at 11:05

## 2021-05-04 ENCOUNTER — INFUSION (OUTPATIENT)
Dept: INFUSION THERAPY | Facility: HOSPITAL | Age: 71
End: 2021-05-04
Attending: NURSE PRACTITIONER
Payer: MEDICARE

## 2021-05-04 VITALS
RESPIRATION RATE: 16 BRPM | TEMPERATURE: 99 F | SYSTOLIC BLOOD PRESSURE: 139 MMHG | DIASTOLIC BLOOD PRESSURE: 57 MMHG | HEART RATE: 86 BPM

## 2021-05-04 DIAGNOSIS — D46.C MDS (MYELODYSPLASTIC SYNDROME) WITH 5Q DELETION: Primary | ICD-10-CM

## 2021-05-04 PROCEDURE — 96401 CHEMO ANTI-NEOPL SQ/IM: CPT

## 2021-05-04 PROCEDURE — 63600175 PHARM REV CODE 636 W HCPCS: Mod: JG | Performed by: INTERNAL MEDICINE

## 2021-05-04 RX ORDER — AZACITIDINE 100 MG/1
75 INJECTION, POWDER, LYOPHILIZED, FOR SOLUTION INTRAVENOUS; SUBCUTANEOUS
Status: COMPLETED | OUTPATIENT
Start: 2021-05-04 | End: 2021-05-04

## 2021-05-04 RX ADMIN — AZACITIDINE 145 MG: 100 INJECTION, POWDER, LYOPHILIZED, FOR SOLUTION SUBCUTANEOUS at 11:05

## 2021-05-05 ENCOUNTER — INFUSION (OUTPATIENT)
Dept: INFUSION THERAPY | Facility: HOSPITAL | Age: 71
End: 2021-05-05
Attending: INTERNAL MEDICINE
Payer: MEDICARE

## 2021-05-05 VITALS
RESPIRATION RATE: 18 BRPM | SYSTOLIC BLOOD PRESSURE: 176 MMHG | TEMPERATURE: 99 F | DIASTOLIC BLOOD PRESSURE: 75 MMHG | HEART RATE: 74 BPM

## 2021-05-05 DIAGNOSIS — D46.C MDS (MYELODYSPLASTIC SYNDROME) WITH 5Q DELETION: Primary | ICD-10-CM

## 2021-05-05 PROCEDURE — 96401 CHEMO ANTI-NEOPL SQ/IM: CPT

## 2021-05-05 PROCEDURE — 63600175 PHARM REV CODE 636 W HCPCS: Mod: JW,JG | Performed by: INTERNAL MEDICINE

## 2021-05-05 RX ORDER — AZACITIDINE 100 MG/1
75 INJECTION, POWDER, LYOPHILIZED, FOR SOLUTION INTRAVENOUS; SUBCUTANEOUS
Status: COMPLETED | OUTPATIENT
Start: 2021-05-05 | End: 2021-05-05

## 2021-05-05 RX ADMIN — AZACITIDINE 145 MG: 100 INJECTION, POWDER, LYOPHILIZED, FOR SOLUTION SUBCUTANEOUS at 10:05

## 2021-05-06 ENCOUNTER — INFUSION (OUTPATIENT)
Dept: INFUSION THERAPY | Facility: HOSPITAL | Age: 71
End: 2021-05-06
Attending: NURSE PRACTITIONER
Payer: MEDICARE

## 2021-05-06 ENCOUNTER — TELEPHONE (OUTPATIENT)
Dept: HEMATOLOGY/ONCOLOGY | Facility: CLINIC | Age: 71
End: 2021-05-06

## 2021-05-06 VITALS
TEMPERATURE: 98 F | DIASTOLIC BLOOD PRESSURE: 78 MMHG | HEART RATE: 70 BPM | SYSTOLIC BLOOD PRESSURE: 185 MMHG | RESPIRATION RATE: 18 BRPM

## 2021-05-06 DIAGNOSIS — D61.818 PANCYTOPENIA: Primary | ICD-10-CM

## 2021-05-06 DIAGNOSIS — D46.C MDS (MYELODYSPLASTIC SYNDROME) WITH 5Q DELETION: ICD-10-CM

## 2021-05-06 DIAGNOSIS — D46.C MDS (MYELODYSPLASTIC SYNDROME) WITH 5Q DELETION: Primary | ICD-10-CM

## 2021-05-06 PROCEDURE — 96401 CHEMO ANTI-NEOPL SQ/IM: CPT

## 2021-05-06 PROCEDURE — 63600175 PHARM REV CODE 636 W HCPCS: Mod: JG | Performed by: INTERNAL MEDICINE

## 2021-05-06 RX ORDER — DIPHENHYDRAMINE HCL 25 MG
25 CAPSULE ORAL
Status: CANCELLED | OUTPATIENT
Start: 2021-05-06

## 2021-05-06 RX ORDER — HYDROCODONE BITARTRATE AND ACETAMINOPHEN 500; 5 MG/1; MG/1
TABLET ORAL ONCE
Status: CANCELLED | OUTPATIENT
Start: 2021-05-06 | End: 2021-05-06

## 2021-05-06 RX ORDER — AZACITIDINE 100 MG/1
75 INJECTION, POWDER, LYOPHILIZED, FOR SOLUTION INTRAVENOUS; SUBCUTANEOUS
Status: COMPLETED | OUTPATIENT
Start: 2021-05-06 | End: 2021-05-06

## 2021-05-06 RX ORDER — ACETAMINOPHEN 325 MG/1
650 TABLET ORAL
Status: CANCELLED | OUTPATIENT
Start: 2021-05-06

## 2021-05-06 RX ADMIN — AZACITIDINE 145 MG: 100 INJECTION, POWDER, LYOPHILIZED, FOR SOLUTION SUBCUTANEOUS at 12:05

## 2021-05-07 ENCOUNTER — INFUSION (OUTPATIENT)
Dept: INFUSION THERAPY | Facility: HOSPITAL | Age: 71
End: 2021-05-07
Attending: NURSE PRACTITIONER
Payer: MEDICARE

## 2021-05-07 VITALS
HEART RATE: 70 BPM | DIASTOLIC BLOOD PRESSURE: 77 MMHG | SYSTOLIC BLOOD PRESSURE: 181 MMHG | TEMPERATURE: 98 F | RESPIRATION RATE: 18 BRPM

## 2021-05-07 VITALS
SYSTOLIC BLOOD PRESSURE: 161 MMHG | RESPIRATION RATE: 18 BRPM | DIASTOLIC BLOOD PRESSURE: 77 MMHG | HEART RATE: 67 BPM | TEMPERATURE: 98 F

## 2021-05-07 DIAGNOSIS — D46.C MDS (MYELODYSPLASTIC SYNDROME) WITH 5Q DELETION: Primary | ICD-10-CM

## 2021-05-07 DIAGNOSIS — D61.818 PANCYTOPENIA: ICD-10-CM

## 2021-05-07 DIAGNOSIS — D46.C MDS (MYELODYSPLASTIC SYNDROME) WITH 5Q DELETION: ICD-10-CM

## 2021-05-07 PROCEDURE — 36430 TRANSFUSION BLD/BLD COMPNT: CPT

## 2021-05-07 PROCEDURE — 27201040 HC RC 50 FILTER: Performed by: NURSE PRACTITIONER

## 2021-05-07 PROCEDURE — 86922 COMPATIBILITY TEST ANTIGLOB: CPT | Performed by: NURSE PRACTITIONER

## 2021-05-07 PROCEDURE — 63600175 PHARM REV CODE 636 W HCPCS: Mod: JG | Performed by: INTERNAL MEDICINE

## 2021-05-07 PROCEDURE — P9038 RBC IRRADIATED: HCPCS | Performed by: NURSE PRACTITIONER

## 2021-05-07 PROCEDURE — 96401 CHEMO ANTI-NEOPL SQ/IM: CPT

## 2021-05-07 PROCEDURE — 86902 BLOOD TYPE ANTIGEN DONOR EA: CPT | Mod: 59 | Performed by: NURSE PRACTITIONER

## 2021-05-07 PROCEDURE — 25000003 PHARM REV CODE 250: Performed by: NURSE PRACTITIONER

## 2021-05-07 RX ORDER — DIPHENHYDRAMINE HCL 25 MG
25 CAPSULE ORAL
Status: COMPLETED | OUTPATIENT
Start: 2021-05-07 | End: 2021-05-07

## 2021-05-07 RX ORDER — ACETAMINOPHEN 325 MG/1
650 TABLET ORAL
Status: COMPLETED | OUTPATIENT
Start: 2021-05-07 | End: 2021-05-07

## 2021-05-07 RX ORDER — AZACITIDINE 100 MG/1
75 INJECTION, POWDER, LYOPHILIZED, FOR SOLUTION INTRAVENOUS; SUBCUTANEOUS
Status: COMPLETED | OUTPATIENT
Start: 2021-05-07 | End: 2021-05-07

## 2021-05-07 RX ORDER — HYDROCODONE BITARTRATE AND ACETAMINOPHEN 500; 5 MG/1; MG/1
TABLET ORAL ONCE
Status: DISCONTINUED | OUTPATIENT
Start: 2021-05-07 | End: 2021-05-07 | Stop reason: HOSPADM

## 2021-05-07 RX ADMIN — DIPHENHYDRAMINE HYDROCHLORIDE 25 MG: 25 CAPSULE ORAL at 11:05

## 2021-05-07 RX ADMIN — AZACITIDINE 145 MG: 100 INJECTION, POWDER, LYOPHILIZED, FOR SOLUTION SUBCUTANEOUS at 10:05

## 2021-05-07 RX ADMIN — ACETAMINOPHEN 650 MG: 325 TABLET ORAL at 11:05

## 2021-05-10 ENCOUNTER — PES CALL (OUTPATIENT)
Dept: ADMINISTRATIVE | Facility: CLINIC | Age: 71
End: 2021-05-10

## 2021-05-11 ENCOUNTER — TELEPHONE (OUTPATIENT)
Dept: HEMATOLOGY/ONCOLOGY | Facility: CLINIC | Age: 71
End: 2021-05-11

## 2021-05-12 ENCOUNTER — SPECIALTY PHARMACY (OUTPATIENT)
Dept: PHARMACY | Facility: CLINIC | Age: 71
End: 2021-05-12

## 2021-05-12 DIAGNOSIS — D61.818 PANCYTOPENIA: ICD-10-CM

## 2021-05-12 RX ORDER — ACYCLOVIR 400 MG/1
400 TABLET ORAL 2 TIMES DAILY
Qty: 60 TABLET | Refills: 6 | Status: SHIPPED | OUTPATIENT
Start: 2021-05-12

## 2021-05-13 ENCOUNTER — TELEPHONE (OUTPATIENT)
Dept: HEMATOLOGY/ONCOLOGY | Facility: CLINIC | Age: 71
End: 2021-05-13

## 2021-05-13 ENCOUNTER — PATIENT MESSAGE (OUTPATIENT)
Dept: HEMATOLOGY/ONCOLOGY | Facility: CLINIC | Age: 71
End: 2021-05-13

## 2021-05-13 ENCOUNTER — LAB VISIT (OUTPATIENT)
Dept: LAB | Facility: HOSPITAL | Age: 71
End: 2021-05-13
Attending: INTERNAL MEDICINE
Payer: MEDICARE

## 2021-05-13 DIAGNOSIS — E11.9 TYPE 2 DIABETES MELLITUS WITHOUT COMPLICATION: ICD-10-CM

## 2021-05-13 DIAGNOSIS — D64.9 ANEMIA REQUIRING TRANSFUSIONS: ICD-10-CM

## 2021-05-13 DIAGNOSIS — D46.C MDS (MYELODYSPLASTIC SYNDROME) WITH 5Q DELETION: ICD-10-CM

## 2021-05-13 LAB
ABO + RH BLD: NORMAL
ALBUMIN SERPL BCP-MCNC: 3.5 G/DL (ref 3.5–5.2)
ALP SERPL-CCNC: 62 U/L (ref 55–135)
ALT SERPL W/O P-5'-P-CCNC: 69 U/L (ref 10–44)
ANION GAP SERPL CALC-SCNC: 7 MMOL/L (ref 8–16)
ANISOCYTOSIS BLD QL SMEAR: SLIGHT
AST SERPL-CCNC: 44 U/L (ref 10–40)
BASOPHILS # BLD AUTO: 0.03 K/UL (ref 0–0.2)
BASOPHILS NFR BLD: 1 % (ref 0–1.9)
BILIRUB SERPL-MCNC: 0.4 MG/DL (ref 0.1–1)
BLD GP AB SCN CELLS X3 SERPL QL: NORMAL
BUN SERPL-MCNC: 19 MG/DL (ref 8–23)
CALCIUM SERPL-MCNC: 9.4 MG/DL (ref 8.7–10.5)
CHLORIDE SERPL-SCNC: 103 MMOL/L (ref 95–110)
CO2 SERPL-SCNC: 29 MMOL/L (ref 23–29)
CREAT SERPL-MCNC: 1.1 MG/DL (ref 0.5–1.4)
DIFFERENTIAL METHOD: ABNORMAL
EOSINOPHIL # BLD AUTO: 0.2 K/UL (ref 0–0.5)
EOSINOPHIL NFR BLD: 5.2 % (ref 0–8)
ERYTHROCYTE [DISTWIDTH] IN BLOOD BY AUTOMATED COUNT: 17 % (ref 11.5–14.5)
EST. GFR  (AFRICAN AMERICAN): 58.8 ML/MIN/1.73 M^2
EST. GFR  (NON AFRICAN AMERICAN): 51 ML/MIN/1.73 M^2
ESTIMATED AVG GLUCOSE: 131 MG/DL (ref 68–131)
GLUCOSE SERPL-MCNC: 110 MG/DL (ref 70–110)
HBA1C MFR BLD: 6.2 % (ref 4–5.6)
HCT VFR BLD AUTO: 22.8 % (ref 37–48.5)
HGB BLD-MCNC: 7.4 G/DL (ref 12–16)
HYPOCHROMIA BLD QL SMEAR: ABNORMAL
IMM GRANULOCYTES # BLD AUTO: 0.03 K/UL (ref 0–0.04)
IMM GRANULOCYTES NFR BLD AUTO: 1 % (ref 0–0.5)
LYMPHOCYTES # BLD AUTO: 1.2 K/UL (ref 1–4.8)
LYMPHOCYTES NFR BLD: 38 % (ref 18–48)
MCH RBC QN AUTO: 31 PG (ref 27–31)
MCHC RBC AUTO-ENTMCNC: 32.5 G/DL (ref 32–36)
MCV RBC AUTO: 95 FL (ref 82–98)
MONOCYTES # BLD AUTO: 0.1 K/UL (ref 0.3–1)
MONOCYTES NFR BLD: 3.9 % (ref 4–15)
NEUTROPHILS # BLD AUTO: 1.6 K/UL (ref 1.8–7.7)
NEUTROPHILS NFR BLD: 50.9 % (ref 38–73)
NRBC BLD-RTO: 0 /100 WBC
PLATELET # BLD AUTO: 151 K/UL (ref 150–450)
PLATELET BLD QL SMEAR: ABNORMAL
PMV BLD AUTO: 11.6 FL (ref 9.2–12.9)
POTASSIUM SERPL-SCNC: 4.8 MMOL/L (ref 3.5–5.1)
PROT SERPL-MCNC: 6.9 G/DL (ref 6–8.4)
RBC # BLD AUTO: 2.39 M/UL (ref 4–5.4)
SODIUM SERPL-SCNC: 139 MMOL/L (ref 136–145)
WBC # BLD AUTO: 3.05 K/UL (ref 3.9–12.7)

## 2021-05-13 PROCEDURE — 85025 COMPLETE CBC W/AUTO DIFF WBC: CPT | Performed by: INTERNAL MEDICINE

## 2021-05-13 PROCEDURE — 36415 COLL VENOUS BLD VENIPUNCTURE: CPT | Performed by: INTERNAL MEDICINE

## 2021-05-13 PROCEDURE — 80053 COMPREHEN METABOLIC PANEL: CPT | Performed by: INTERNAL MEDICINE

## 2021-05-13 PROCEDURE — 83036 HEMOGLOBIN GLYCOSYLATED A1C: CPT | Performed by: INTERNAL MEDICINE

## 2021-05-13 PROCEDURE — 86900 BLOOD TYPING SEROLOGIC ABO: CPT | Performed by: INTERNAL MEDICINE

## 2021-05-17 ENCOUNTER — LAB VISIT (OUTPATIENT)
Dept: LAB | Facility: HOSPITAL | Age: 71
End: 2021-05-17
Attending: INTERNAL MEDICINE
Payer: MEDICARE

## 2021-05-17 ENCOUNTER — TELEPHONE (OUTPATIENT)
Dept: HEMATOLOGY/ONCOLOGY | Facility: CLINIC | Age: 71
End: 2021-05-17

## 2021-05-17 DIAGNOSIS — D64.9 ANEMIA REQUIRING TRANSFUSIONS: ICD-10-CM

## 2021-05-17 DIAGNOSIS — D46.C MDS (MYELODYSPLASTIC SYNDROME) WITH 5Q DELETION: ICD-10-CM

## 2021-05-17 DIAGNOSIS — D64.9 ANEMIA REQUIRING TRANSFUSIONS: Primary | ICD-10-CM

## 2021-05-17 LAB
ABO + RH BLD: NORMAL
ALBUMIN SERPL BCP-MCNC: 3.6 G/DL (ref 3.5–5.2)
ALP SERPL-CCNC: 59 U/L (ref 55–135)
ALT SERPL W/O P-5'-P-CCNC: 53 U/L (ref 10–44)
ANION GAP SERPL CALC-SCNC: 6 MMOL/L (ref 8–16)
AST SERPL-CCNC: 31 U/L (ref 10–40)
BASOPHILS # BLD AUTO: 0.01 K/UL (ref 0–0.2)
BASOPHILS NFR BLD: 0.4 % (ref 0–1.9)
BILIRUB SERPL-MCNC: 0.4 MG/DL (ref 0.1–1)
BLD GP AB SCN CELLS X3 SERPL QL: NORMAL
BUN SERPL-MCNC: 24 MG/DL (ref 8–23)
CALCIUM SERPL-MCNC: 10.2 MG/DL (ref 8.7–10.5)
CHLORIDE SERPL-SCNC: 103 MMOL/L (ref 95–110)
CO2 SERPL-SCNC: 30 MMOL/L (ref 23–29)
CREAT SERPL-MCNC: 0.8 MG/DL (ref 0.5–1.4)
DIFFERENTIAL METHOD: ABNORMAL
EOSINOPHIL # BLD AUTO: 0.1 K/UL (ref 0–0.5)
EOSINOPHIL NFR BLD: 4.3 % (ref 0–8)
ERYTHROCYTE [DISTWIDTH] IN BLOOD BY AUTOMATED COUNT: 17 % (ref 11.5–14.5)
EST. GFR  (AFRICAN AMERICAN): >60 ML/MIN/1.73 M^2
EST. GFR  (NON AFRICAN AMERICAN): >60 ML/MIN/1.73 M^2
GLUCOSE SERPL-MCNC: 117 MG/DL (ref 70–110)
HCT VFR BLD AUTO: 20.4 % (ref 37–48.5)
HGB BLD-MCNC: 6.7 G/DL (ref 12–16)
IMM GRANULOCYTES # BLD AUTO: 0.03 K/UL (ref 0–0.04)
IMM GRANULOCYTES NFR BLD AUTO: 1.1 % (ref 0–0.5)
LYMPHOCYTES # BLD AUTO: 1 K/UL (ref 1–4.8)
LYMPHOCYTES NFR BLD: 36 % (ref 18–48)
MCH RBC QN AUTO: 31.6 PG (ref 27–31)
MCHC RBC AUTO-ENTMCNC: 32.8 G/DL (ref 32–36)
MCV RBC AUTO: 96 FL (ref 82–98)
MONOCYTES # BLD AUTO: 0.1 K/UL (ref 0.3–1)
MONOCYTES NFR BLD: 3.2 % (ref 4–15)
NEUTROPHILS # BLD AUTO: 1.5 K/UL (ref 1.8–7.7)
NEUTROPHILS NFR BLD: 55 % (ref 38–73)
NRBC BLD-RTO: 0 /100 WBC
PLATELET # BLD AUTO: 112 K/UL (ref 150–450)
PMV BLD AUTO: 11.6 FL (ref 9.2–12.9)
POTASSIUM SERPL-SCNC: 4.3 MMOL/L (ref 3.5–5.1)
PROT SERPL-MCNC: 7 G/DL (ref 6–8.4)
RBC # BLD AUTO: 2.12 M/UL (ref 4–5.4)
SODIUM SERPL-SCNC: 139 MMOL/L (ref 136–145)
WBC # BLD AUTO: 2.78 K/UL (ref 3.9–12.7)

## 2021-05-17 PROCEDURE — 80053 COMPREHEN METABOLIC PANEL: CPT | Performed by: INTERNAL MEDICINE

## 2021-05-17 PROCEDURE — 86900 BLOOD TYPING SEROLOGIC ABO: CPT | Performed by: INTERNAL MEDICINE

## 2021-05-17 PROCEDURE — 85025 COMPLETE CBC W/AUTO DIFF WBC: CPT | Performed by: INTERNAL MEDICINE

## 2021-05-17 PROCEDURE — 36415 COLL VENOUS BLD VENIPUNCTURE: CPT | Performed by: INTERNAL MEDICINE

## 2021-05-17 PROCEDURE — 86922 COMPATIBILITY TEST ANTIGLOB: CPT | Performed by: NURSE PRACTITIONER

## 2021-05-17 RX ORDER — ACETAMINOPHEN 325 MG/1
650 TABLET ORAL
Status: CANCELLED | OUTPATIENT
Start: 2021-05-17

## 2021-05-17 RX ORDER — DIPHENHYDRAMINE HCL 25 MG
25 CAPSULE ORAL
Status: CANCELLED | OUTPATIENT
Start: 2021-05-17

## 2021-05-17 RX ORDER — HYDROCODONE BITARTRATE AND ACETAMINOPHEN 500; 5 MG/1; MG/1
TABLET ORAL ONCE
Status: CANCELLED | OUTPATIENT
Start: 2021-05-17 | End: 2021-05-17

## 2021-05-19 ENCOUNTER — INFUSION (OUTPATIENT)
Dept: INFUSION THERAPY | Facility: HOSPITAL | Age: 71
End: 2021-05-19
Payer: MEDICARE

## 2021-05-19 ENCOUNTER — PATIENT MESSAGE (OUTPATIENT)
Dept: HEMATOLOGY/ONCOLOGY | Facility: CLINIC | Age: 71
End: 2021-05-19

## 2021-05-19 VITALS
HEART RATE: 72 BPM | DIASTOLIC BLOOD PRESSURE: 63 MMHG | RESPIRATION RATE: 18 BRPM | TEMPERATURE: 98 F | SYSTOLIC BLOOD PRESSURE: 143 MMHG

## 2021-05-19 DIAGNOSIS — D46.C MDS (MYELODYSPLASTIC SYNDROME) WITH 5Q DELETION: ICD-10-CM

## 2021-05-19 DIAGNOSIS — D64.9 ANEMIA REQUIRING TRANSFUSIONS: ICD-10-CM

## 2021-05-19 LAB
ANNOTATION COMMENT IMP: NORMAL
BLD PROD TYP BPU: NORMAL
BLOOD UNIT EXPIRATION DATE: NORMAL
BLOOD UNIT TYPE CODE: 6200
BLOOD UNIT TYPE: NORMAL
CODING SYSTEM: NORMAL
DISPENSE STATUS: NORMAL
NGS CLINCIAL TRIALS: NORMAL
NGS INDICATION OF TEST: NORMAL
NGS INTERPRETATION: NORMAL
NGS ONCOHEME PANEL GENE LIST: NORMAL
NGS PATHOGENIC MUTATIONS DETECTED: NORMAL
NGS REVIEWED BY:: NORMAL
NGS VARIANTS OF UNKNOWN SIGNIFICANCE: NORMAL
NGSHM RESULT, BONE MARROW: NORMAL
NUM UNITS TRANS PACKED RBC: NORMAL
REF LAB TEST METHOD: NORMAL
SPECIMEN SOURCE: NORMAL
TEST PERFORMANCE INFO SPEC: NORMAL

## 2021-05-19 PROCEDURE — 36430 TRANSFUSION BLD/BLD COMPNT: CPT

## 2021-05-19 PROCEDURE — P9040 RBC LEUKOREDUCED IRRADIATED: HCPCS | Performed by: NURSE PRACTITIONER

## 2021-05-19 PROCEDURE — 25000003 PHARM REV CODE 250: Performed by: NURSE PRACTITIONER

## 2021-05-19 RX ORDER — ACETAMINOPHEN 325 MG/1
650 TABLET ORAL
Status: COMPLETED | OUTPATIENT
Start: 2021-05-19 | End: 2021-05-19

## 2021-05-19 RX ORDER — DIPHENHYDRAMINE HCL 25 MG
25 CAPSULE ORAL
Status: COMPLETED | OUTPATIENT
Start: 2021-05-19 | End: 2021-05-19

## 2021-05-19 RX ORDER — HYDROCODONE BITARTRATE AND ACETAMINOPHEN 500; 5 MG/1; MG/1
TABLET ORAL ONCE
Status: COMPLETED | OUTPATIENT
Start: 2021-05-19 | End: 2021-05-19

## 2021-05-19 RX ORDER — ONDANSETRON 8 MG/1
TABLET, ORALLY DISINTEGRATING ORAL
Qty: 30 TABLET | Refills: 6 | Status: SHIPPED | OUTPATIENT
Start: 2021-05-19 | End: 2021-01-01 | Stop reason: SDUPTHER

## 2021-05-19 RX ADMIN — DIPHENHYDRAMINE HYDROCHLORIDE 25 MG: 25 CAPSULE ORAL at 01:05

## 2021-05-19 RX ADMIN — ACETAMINOPHEN 650 MG: 325 TABLET ORAL at 01:05

## 2021-05-19 RX ADMIN — SODIUM CHLORIDE: 0.9 INJECTION, SOLUTION INTRAVENOUS at 01:05

## 2021-05-20 RX ORDER — ONDANSETRON 8 MG/1
TABLET, ORALLY DISINTEGRATING ORAL
Qty: 30 TABLET | Refills: 6 | Status: CANCELLED | OUTPATIENT
Start: 2021-05-20

## 2021-05-24 ENCOUNTER — LAB VISIT (OUTPATIENT)
Dept: LAB | Facility: HOSPITAL | Age: 71
End: 2021-05-24
Attending: INTERNAL MEDICINE
Payer: MEDICARE

## 2021-05-24 DIAGNOSIS — D46.C MDS (MYELODYSPLASTIC SYNDROME) WITH 5Q DELETION: Primary | ICD-10-CM

## 2021-05-24 DIAGNOSIS — D61.818 PANCYTOPENIA: ICD-10-CM

## 2021-05-24 DIAGNOSIS — D46.C MDS (MYELODYSPLASTIC SYNDROME) WITH 5Q DELETION: ICD-10-CM

## 2021-05-24 DIAGNOSIS — D64.9 ANEMIA REQUIRING TRANSFUSIONS: ICD-10-CM

## 2021-05-24 LAB
ABO + RH BLD: NORMAL
ALBUMIN SERPL BCP-MCNC: 3.6 G/DL (ref 3.5–5.2)
ALP SERPL-CCNC: 59 U/L (ref 55–135)
ALT SERPL W/O P-5'-P-CCNC: 40 U/L (ref 10–44)
ANION GAP SERPL CALC-SCNC: 12 MMOL/L (ref 8–16)
ANISOCYTOSIS BLD QL SMEAR: SLIGHT
AST SERPL-CCNC: 28 U/L (ref 10–40)
BASOPHILS # BLD AUTO: ABNORMAL K/UL (ref 0–0.2)
BASOPHILS NFR BLD: 3 % (ref 0–1.9)
BILIRUB SERPL-MCNC: 0.5 MG/DL (ref 0.1–1)
BLD GP AB SCN CELLS X3 SERPL QL: NORMAL
BUN SERPL-MCNC: 21 MG/DL (ref 8–23)
CALCIUM SERPL-MCNC: 10 MG/DL (ref 8.7–10.5)
CHLORIDE SERPL-SCNC: 104 MMOL/L (ref 95–110)
CO2 SERPL-SCNC: 23 MMOL/L (ref 23–29)
CREAT SERPL-MCNC: 1 MG/DL (ref 0.5–1.4)
DIFFERENTIAL METHOD: ABNORMAL
EOSINOPHIL # BLD AUTO: ABNORMAL K/UL (ref 0–0.5)
EOSINOPHIL NFR BLD: 7 % (ref 0–8)
ERYTHROCYTE [DISTWIDTH] IN BLOOD BY AUTOMATED COUNT: 18.1 % (ref 11.5–14.5)
EST. GFR  (AFRICAN AMERICAN): >60 ML/MIN/1.73 M^2
EST. GFR  (NON AFRICAN AMERICAN): 57.2 ML/MIN/1.73 M^2
GLUCOSE SERPL-MCNC: 108 MG/DL (ref 70–110)
HCT VFR BLD AUTO: 21.3 % (ref 37–48.5)
HGB BLD-MCNC: 7.2 G/DL (ref 12–16)
IMM GRANULOCYTES # BLD AUTO: ABNORMAL K/UL (ref 0–0.04)
IMM GRANULOCYTES NFR BLD AUTO: ABNORMAL % (ref 0–0.5)
LYMPHOCYTES # BLD AUTO: ABNORMAL K/UL (ref 1–4.8)
LYMPHOCYTES NFR BLD: 41 % (ref 18–48)
MCH RBC QN AUTO: 32.3 PG (ref 27–31)
MCHC RBC AUTO-ENTMCNC: 33.8 G/DL (ref 32–36)
MCV RBC AUTO: 96 FL (ref 82–98)
MONOCYTES # BLD AUTO: ABNORMAL K/UL (ref 0.3–1)
MONOCYTES NFR BLD: 0 % (ref 4–15)
NEUTROPHILS NFR BLD: 49 % (ref 38–73)
NRBC BLD-RTO: 0 /100 WBC
PLATELET # BLD AUTO: 215 K/UL (ref 150–450)
PLATELET BLD QL SMEAR: ABNORMAL
PMV BLD AUTO: 11.7 FL (ref 9.2–12.9)
POLYCHROMASIA BLD QL SMEAR: ABNORMAL
POTASSIUM SERPL-SCNC: 4.4 MMOL/L (ref 3.5–5.1)
PROT SERPL-MCNC: 7.1 G/DL (ref 6–8.4)
RBC # BLD AUTO: 2.23 M/UL (ref 4–5.4)
SODIUM SERPL-SCNC: 139 MMOL/L (ref 136–145)
WBC # BLD AUTO: 2.02 K/UL (ref 3.9–12.7)

## 2021-05-24 PROCEDURE — 36415 COLL VENOUS BLD VENIPUNCTURE: CPT | Performed by: INTERNAL MEDICINE

## 2021-05-24 PROCEDURE — 86900 BLOOD TYPING SEROLOGIC ABO: CPT | Performed by: INTERNAL MEDICINE

## 2021-05-24 PROCEDURE — 85007 BL SMEAR W/DIFF WBC COUNT: CPT | Performed by: INTERNAL MEDICINE

## 2021-05-24 PROCEDURE — 80053 COMPREHEN METABOLIC PANEL: CPT | Performed by: INTERNAL MEDICINE

## 2021-05-24 PROCEDURE — 85027 COMPLETE CBC AUTOMATED: CPT | Performed by: INTERNAL MEDICINE

## 2021-05-24 RX ORDER — HYDROCODONE BITARTRATE AND ACETAMINOPHEN 500; 5 MG/1; MG/1
TABLET ORAL ONCE
Status: CANCELLED | OUTPATIENT
Start: 2021-05-24 | End: 2021-05-24

## 2021-05-24 RX ORDER — DIPHENHYDRAMINE HCL 25 MG
25 CAPSULE ORAL
Status: CANCELLED | OUTPATIENT
Start: 2021-05-24

## 2021-05-24 RX ORDER — ACETAMINOPHEN 325 MG/1
650 TABLET ORAL
Status: CANCELLED | OUTPATIENT
Start: 2021-05-24

## 2021-05-25 ENCOUNTER — INFUSION (OUTPATIENT)
Dept: INFUSION THERAPY | Facility: HOSPITAL | Age: 71
End: 2021-05-25
Payer: MEDICARE

## 2021-05-25 VITALS
TEMPERATURE: 98 F | WEIGHT: 169.31 LBS | HEIGHT: 65 IN | RESPIRATION RATE: 18 BRPM | OXYGEN SATURATION: 100 % | DIASTOLIC BLOOD PRESSURE: 71 MMHG | BODY MASS INDEX: 28.21 KG/M2 | SYSTOLIC BLOOD PRESSURE: 155 MMHG | HEART RATE: 77 BPM

## 2021-05-25 DIAGNOSIS — D61.818 PANCYTOPENIA: ICD-10-CM

## 2021-05-25 DIAGNOSIS — D46.C MDS (MYELODYSPLASTIC SYNDROME) WITH 5Q DELETION: ICD-10-CM

## 2021-05-25 PROCEDURE — P9040 RBC LEUKOREDUCED IRRADIATED: HCPCS | Performed by: NURSE PRACTITIONER

## 2021-05-25 PROCEDURE — 25000003 PHARM REV CODE 250: Performed by: NURSE PRACTITIONER

## 2021-05-25 PROCEDURE — 86902 BLOOD TYPE ANTIGEN DONOR EA: CPT | Performed by: NURSE PRACTITIONER

## 2021-05-25 PROCEDURE — 36430 TRANSFUSION BLD/BLD COMPNT: CPT

## 2021-05-25 PROCEDURE — 86922 COMPATIBILITY TEST ANTIGLOB: CPT | Performed by: NURSE PRACTITIONER

## 2021-05-25 RX ORDER — ACETAMINOPHEN 325 MG/1
650 TABLET ORAL
Status: COMPLETED | OUTPATIENT
Start: 2021-05-25 | End: 2021-05-25

## 2021-05-25 RX ORDER — HYDROCODONE BITARTRATE AND ACETAMINOPHEN 500; 5 MG/1; MG/1
TABLET ORAL ONCE
Status: COMPLETED | OUTPATIENT
Start: 2021-05-25 | End: 2021-05-25

## 2021-05-25 RX ORDER — DIPHENHYDRAMINE HCL 25 MG
25 CAPSULE ORAL
Status: COMPLETED | OUTPATIENT
Start: 2021-05-25 | End: 2021-05-25

## 2021-05-25 RX ADMIN — SODIUM CHLORIDE: 0.9 INJECTION, SOLUTION INTRAVENOUS at 01:05

## 2021-05-25 RX ADMIN — DIPHENHYDRAMINE HYDROCHLORIDE 25 MG: 25 CAPSULE ORAL at 01:05

## 2021-05-25 RX ADMIN — ACETAMINOPHEN 650 MG: 325 TABLET ORAL at 01:05

## 2021-05-27 ENCOUNTER — PATIENT MESSAGE (OUTPATIENT)
Dept: HEMATOLOGY/ONCOLOGY | Facility: CLINIC | Age: 71
End: 2021-05-27

## 2021-05-31 ENCOUNTER — LAB VISIT (OUTPATIENT)
Dept: LAB | Facility: HOSPITAL | Age: 71
End: 2021-05-31
Attending: INTERNAL MEDICINE
Payer: MEDICARE

## 2021-05-31 ENCOUNTER — OFFICE VISIT (OUTPATIENT)
Dept: HEMATOLOGY/ONCOLOGY | Facility: CLINIC | Age: 71
End: 2021-05-31
Payer: MEDICARE

## 2021-05-31 ENCOUNTER — INFUSION (OUTPATIENT)
Dept: INFUSION THERAPY | Facility: HOSPITAL | Age: 71
End: 2021-05-31
Attending: INTERNAL MEDICINE
Payer: MEDICARE

## 2021-05-31 VITALS
BODY MASS INDEX: 27.99 KG/M2 | WEIGHT: 168 LBS | RESPIRATION RATE: 12 BRPM | HEART RATE: 66 BPM | SYSTOLIC BLOOD PRESSURE: 127 MMHG | HEIGHT: 65 IN | TEMPERATURE: 98 F | OXYGEN SATURATION: 95 % | DIASTOLIC BLOOD PRESSURE: 60 MMHG

## 2021-05-31 DIAGNOSIS — D46.C MDS (MYELODYSPLASTIC SYNDROME) WITH 5Q DELETION: Primary | ICD-10-CM

## 2021-05-31 DIAGNOSIS — D64.9 ANEMIA REQUIRING TRANSFUSIONS: ICD-10-CM

## 2021-05-31 DIAGNOSIS — D46.C MDS (MYELODYSPLASTIC SYNDROME) WITH 5Q DELETION: ICD-10-CM

## 2021-05-31 LAB
ABO + RH BLD: NORMAL
ALBUMIN SERPL BCP-MCNC: 3.7 G/DL (ref 3.5–5.2)
ALP SERPL-CCNC: 53 U/L (ref 55–135)
ALT SERPL W/O P-5'-P-CCNC: 40 U/L (ref 10–44)
ANION GAP SERPL CALC-SCNC: 12 MMOL/L (ref 8–16)
ANISOCYTOSIS BLD QL SMEAR: SLIGHT
AST SERPL-CCNC: 30 U/L (ref 10–40)
BASOPHILS # BLD AUTO: ABNORMAL K/UL (ref 0–0.2)
BASOPHILS NFR BLD: 3.5 % (ref 0–1.9)
BILIRUB SERPL-MCNC: 0.4 MG/DL (ref 0.1–1)
BLD GP AB SCN CELLS X3 SERPL QL: NORMAL
BUN SERPL-MCNC: 26 MG/DL (ref 8–23)
CALCIUM SERPL-MCNC: 9.6 MG/DL (ref 8.7–10.5)
CHLORIDE SERPL-SCNC: 103 MMOL/L (ref 95–110)
CO2 SERPL-SCNC: 25 MMOL/L (ref 23–29)
CREAT SERPL-MCNC: 1.1 MG/DL (ref 0.5–1.4)
DIFFERENTIAL METHOD: ABNORMAL
EOSINOPHIL # BLD AUTO: ABNORMAL K/UL (ref 0–0.5)
EOSINOPHIL NFR BLD: 11.8 % (ref 0–8)
ERYTHROCYTE [DISTWIDTH] IN BLOOD BY AUTOMATED COUNT: 18.2 % (ref 11.5–14.5)
EST. GFR  (AFRICAN AMERICAN): 58.8 ML/MIN/1.73 M^2
EST. GFR  (NON AFRICAN AMERICAN): 51 ML/MIN/1.73 M^2
GIANT PLATELETS BLD QL SMEAR: PRESENT
GLUCOSE SERPL-MCNC: 104 MG/DL (ref 70–110)
HCT VFR BLD AUTO: 25.6 % (ref 37–48.5)
HGB BLD-MCNC: 8.4 G/DL (ref 12–16)
HYPOCHROMIA BLD QL SMEAR: ABNORMAL
IMM GRANULOCYTES # BLD AUTO: ABNORMAL K/UL (ref 0–0.04)
IMM GRANULOCYTES NFR BLD AUTO: ABNORMAL % (ref 0–0.5)
LYMPHOCYTES # BLD AUTO: ABNORMAL K/UL (ref 1–4.8)
LYMPHOCYTES NFR BLD: 47 % (ref 18–48)
MCH RBC QN AUTO: 31.8 PG (ref 27–31)
MCHC RBC AUTO-ENTMCNC: 32.8 G/DL (ref 32–36)
MCV RBC AUTO: 97 FL (ref 82–98)
MONOCYTES # BLD AUTO: ABNORMAL K/UL (ref 0.3–1)
MONOCYTES NFR BLD: 9.4 % (ref 4–15)
NEUTROPHILS NFR BLD: 27.1 % (ref 38–73)
NEUTS BAND NFR BLD MANUAL: 1.2 %
NRBC BLD-RTO: 1 /100 WBC
OVALOCYTES BLD QL SMEAR: ABNORMAL
PLATELET # BLD AUTO: 337 K/UL (ref 150–450)
PMV BLD AUTO: 11.2 FL (ref 9.2–12.9)
POIKILOCYTOSIS BLD QL SMEAR: SLIGHT
POLYCHROMASIA BLD QL SMEAR: ABNORMAL
POTASSIUM SERPL-SCNC: 4.1 MMOL/L (ref 3.5–5.1)
PROT SERPL-MCNC: 7 G/DL (ref 6–8.4)
RBC # BLD AUTO: 2.64 M/UL (ref 4–5.4)
SODIUM SERPL-SCNC: 140 MMOL/L (ref 136–145)
SPHEROCYTES BLD QL SMEAR: ABNORMAL
WBC # BLD AUTO: 1.99 K/UL (ref 3.9–12.7)

## 2021-05-31 PROCEDURE — 99499 UNLISTED E&M SERVICE: CPT | Mod: S$GLB,,, | Performed by: INTERNAL MEDICINE

## 2021-05-31 PROCEDURE — 1157F PR ADVANCE CARE PLAN OR EQUIV PRESENT IN MEDICAL RECORD: ICD-10-PCS | Mod: S$GLB,,, | Performed by: INTERNAL MEDICINE

## 2021-05-31 PROCEDURE — 99215 OFFICE O/P EST HI 40 MIN: CPT | Mod: S$GLB,,, | Performed by: INTERNAL MEDICINE

## 2021-05-31 PROCEDURE — 3288F FALL RISK ASSESSMENT DOCD: CPT | Mod: CPTII,S$GLB,, | Performed by: INTERNAL MEDICINE

## 2021-05-31 PROCEDURE — 1101F PR PT FALLS ASSESS DOC 0-1 FALLS W/OUT INJ PAST YR: ICD-10-PCS | Mod: CPTII,S$GLB,, | Performed by: INTERNAL MEDICINE

## 2021-05-31 PROCEDURE — 1101F PT FALLS ASSESS-DOCD LE1/YR: CPT | Mod: CPTII,S$GLB,, | Performed by: INTERNAL MEDICINE

## 2021-05-31 PROCEDURE — 99499 RISK ADDL DX/OHS AUDIT: ICD-10-PCS | Mod: S$GLB,,, | Performed by: INTERNAL MEDICINE

## 2021-05-31 PROCEDURE — 1159F MED LIST DOCD IN RCRD: CPT | Mod: S$GLB,,, | Performed by: INTERNAL MEDICINE

## 2021-05-31 PROCEDURE — 96401 CHEMO ANTI-NEOPL SQ/IM: CPT

## 2021-05-31 PROCEDURE — 1125F PR PAIN SEVERITY QUANTIFIED, PAIN PRESENT: ICD-10-PCS | Mod: S$GLB,,, | Performed by: INTERNAL MEDICINE

## 2021-05-31 PROCEDURE — 80053 COMPREHEN METABOLIC PANEL: CPT | Performed by: INTERNAL MEDICINE

## 2021-05-31 PROCEDURE — 85007 BL SMEAR W/DIFF WBC COUNT: CPT | Performed by: INTERNAL MEDICINE

## 2021-05-31 PROCEDURE — 3288F PR FALLS RISK ASSESSMENT DOCUMENTED: ICD-10-PCS | Mod: CPTII,S$GLB,, | Performed by: INTERNAL MEDICINE

## 2021-05-31 PROCEDURE — 63600175 PHARM REV CODE 636 W HCPCS: Mod: JG | Performed by: INTERNAL MEDICINE

## 2021-05-31 PROCEDURE — 1159F PR MEDICATION LIST DOCUMENTED IN MEDICAL RECORD: ICD-10-PCS | Mod: S$GLB,,, | Performed by: INTERNAL MEDICINE

## 2021-05-31 PROCEDURE — 85027 COMPLETE CBC AUTOMATED: CPT | Performed by: INTERNAL MEDICINE

## 2021-05-31 PROCEDURE — 86900 BLOOD TYPING SEROLOGIC ABO: CPT | Performed by: INTERNAL MEDICINE

## 2021-05-31 PROCEDURE — 36415 COLL VENOUS BLD VENIPUNCTURE: CPT | Performed by: INTERNAL MEDICINE

## 2021-05-31 PROCEDURE — 99999 PR PBB SHADOW E&M-EST. PATIENT-LVL V: ICD-10-PCS | Mod: PBBFAC,,, | Performed by: INTERNAL MEDICINE

## 2021-05-31 PROCEDURE — 99215 PR OFFICE/OUTPT VISIT, EST, LEVL V, 40-54 MIN: ICD-10-PCS | Mod: S$GLB,,, | Performed by: INTERNAL MEDICINE

## 2021-05-31 PROCEDURE — 1157F ADVNC CARE PLAN IN RCRD: CPT | Mod: S$GLB,,, | Performed by: INTERNAL MEDICINE

## 2021-05-31 PROCEDURE — 1125F AMNT PAIN NOTED PAIN PRSNT: CPT | Mod: S$GLB,,, | Performed by: INTERNAL MEDICINE

## 2021-05-31 PROCEDURE — 3008F PR BODY MASS INDEX (BMI) DOCUMENTED: ICD-10-PCS | Mod: CPTII,S$GLB,, | Performed by: INTERNAL MEDICINE

## 2021-05-31 PROCEDURE — 3008F BODY MASS INDEX DOCD: CPT | Mod: CPTII,S$GLB,, | Performed by: INTERNAL MEDICINE

## 2021-05-31 PROCEDURE — 99999 PR PBB SHADOW E&M-EST. PATIENT-LVL V: CPT | Mod: PBBFAC,,, | Performed by: INTERNAL MEDICINE

## 2021-05-31 RX ORDER — AZACITIDINE 100 MG/1
75 INJECTION, POWDER, LYOPHILIZED, FOR SOLUTION INTRAVENOUS; SUBCUTANEOUS
Status: CANCELLED | OUTPATIENT
Start: 2021-05-31

## 2021-05-31 RX ORDER — ONDANSETRON 8 MG/1
8 TABLET, ORALLY DISINTEGRATING ORAL ONCE
Status: CANCELLED | OUTPATIENT
Start: 2021-06-03 | End: 2021-06-03

## 2021-05-31 RX ORDER — AZACITIDINE 100 MG/1
75 INJECTION, POWDER, LYOPHILIZED, FOR SOLUTION INTRAVENOUS; SUBCUTANEOUS
Status: CANCELLED | OUTPATIENT
Start: 2021-06-03

## 2021-05-31 RX ORDER — AZACITIDINE 100 MG/1
75 INJECTION, POWDER, LYOPHILIZED, FOR SOLUTION INTRAVENOUS; SUBCUTANEOUS
Status: COMPLETED | OUTPATIENT
Start: 2021-05-31 | End: 2021-05-31

## 2021-05-31 RX ORDER — AZACITIDINE 100 MG/1
75 INJECTION, POWDER, LYOPHILIZED, FOR SOLUTION INTRAVENOUS; SUBCUTANEOUS
Status: CANCELLED | OUTPATIENT
Start: 2021-06-01

## 2021-05-31 RX ORDER — ONDANSETRON 8 MG/1
8 TABLET, ORALLY DISINTEGRATING ORAL ONCE
Status: CANCELLED | OUTPATIENT
Start: 2021-06-01 | End: 2021-06-01

## 2021-05-31 RX ORDER — AZACITIDINE 100 MG/1
75 INJECTION, POWDER, LYOPHILIZED, FOR SOLUTION INTRAVENOUS; SUBCUTANEOUS
Status: CANCELLED | OUTPATIENT
Start: 2021-06-04

## 2021-05-31 RX ORDER — ONDANSETRON 8 MG/1
8 TABLET, ORALLY DISINTEGRATING ORAL ONCE
Status: CANCELLED | OUTPATIENT
Start: 2021-05-31 | End: 2021-05-31

## 2021-05-31 RX ORDER — ONDANSETRON 8 MG/1
8 TABLET, ORALLY DISINTEGRATING ORAL ONCE
Status: CANCELLED | OUTPATIENT
Start: 2021-06-02 | End: 2021-06-02

## 2021-05-31 RX ORDER — AZACITIDINE 100 MG/1
75 INJECTION, POWDER, LYOPHILIZED, FOR SOLUTION INTRAVENOUS; SUBCUTANEOUS
Status: CANCELLED | OUTPATIENT
Start: 2021-06-02

## 2021-05-31 RX ORDER — ONDANSETRON 8 MG/1
8 TABLET, ORALLY DISINTEGRATING ORAL ONCE
Status: CANCELLED | OUTPATIENT
Start: 2021-06-04 | End: 2021-06-04

## 2021-05-31 RX ADMIN — AZACITIDINE 145 MG: 100 INJECTION, POWDER, LYOPHILIZED, FOR SOLUTION SUBCUTANEOUS at 10:05

## 2021-06-01 ENCOUNTER — INFUSION (OUTPATIENT)
Dept: INFUSION THERAPY | Facility: HOSPITAL | Age: 71
End: 2021-06-01
Attending: NURSE PRACTITIONER
Payer: MEDICARE

## 2021-06-01 VITALS
SYSTOLIC BLOOD PRESSURE: 143 MMHG | HEART RATE: 75 BPM | DIASTOLIC BLOOD PRESSURE: 66 MMHG | RESPIRATION RATE: 14 BRPM | TEMPERATURE: 99 F | OXYGEN SATURATION: 97 %

## 2021-06-01 DIAGNOSIS — D46.C MDS (MYELODYSPLASTIC SYNDROME) WITH 5Q DELETION: Primary | ICD-10-CM

## 2021-06-01 DIAGNOSIS — E87.6 HYPOKALEMIA: ICD-10-CM

## 2021-06-01 PROCEDURE — 25000003 PHARM REV CODE 250: Performed by: INTERNAL MEDICINE

## 2021-06-01 PROCEDURE — 96401 CHEMO ANTI-NEOPL SQ/IM: CPT

## 2021-06-01 PROCEDURE — 63600175 PHARM REV CODE 636 W HCPCS: Mod: JG | Performed by: INTERNAL MEDICINE

## 2021-06-01 RX ORDER — ONDANSETRON 4 MG/1
8 TABLET, ORALLY DISINTEGRATING ORAL ONCE
Status: DISCONTINUED | OUTPATIENT
Start: 2021-06-01 | End: 2021-06-01 | Stop reason: HOSPADM

## 2021-06-01 RX ORDER — AZACITIDINE 100 MG/1
75 INJECTION, POWDER, LYOPHILIZED, FOR SOLUTION INTRAVENOUS; SUBCUTANEOUS
Status: COMPLETED | OUTPATIENT
Start: 2021-06-01 | End: 2021-06-01

## 2021-06-01 RX ADMIN — AZACITIDINE 145 MG: 100 INJECTION, POWDER, LYOPHILIZED, FOR SOLUTION SUBCUTANEOUS at 11:06

## 2021-06-02 ENCOUNTER — INFUSION (OUTPATIENT)
Dept: INFUSION THERAPY | Facility: HOSPITAL | Age: 71
End: 2021-06-02
Attending: NURSE PRACTITIONER
Payer: MEDICARE

## 2021-06-02 VITALS
RESPIRATION RATE: 18 BRPM | TEMPERATURE: 98 F | DIASTOLIC BLOOD PRESSURE: 89 MMHG | SYSTOLIC BLOOD PRESSURE: 170 MMHG | HEART RATE: 72 BPM

## 2021-06-02 DIAGNOSIS — D46.C MDS (MYELODYSPLASTIC SYNDROME) WITH 5Q DELETION: Primary | ICD-10-CM

## 2021-06-02 DIAGNOSIS — E87.6 HYPOKALEMIA: ICD-10-CM

## 2021-06-02 PROCEDURE — 63600175 PHARM REV CODE 636 W HCPCS: Mod: JG | Performed by: INTERNAL MEDICINE

## 2021-06-02 PROCEDURE — 96401 CHEMO ANTI-NEOPL SQ/IM: CPT

## 2021-06-02 RX ORDER — ONDANSETRON 4 MG/1
8 TABLET, ORALLY DISINTEGRATING ORAL ONCE
Status: DISCONTINUED | OUTPATIENT
Start: 2021-06-02 | End: 2021-06-02 | Stop reason: HOSPADM

## 2021-06-02 RX ORDER — AZACITIDINE 100 MG/1
75 INJECTION, POWDER, LYOPHILIZED, FOR SOLUTION INTRAVENOUS; SUBCUTANEOUS
Status: COMPLETED | OUTPATIENT
Start: 2021-06-02 | End: 2021-06-02

## 2021-06-02 RX ADMIN — AZACITIDINE 145 MG: 100 INJECTION, POWDER, LYOPHILIZED, FOR SOLUTION INTRAVENOUS; SUBCUTANEOUS at 11:06

## 2021-06-03 ENCOUNTER — INFUSION (OUTPATIENT)
Dept: INFUSION THERAPY | Facility: HOSPITAL | Age: 71
End: 2021-06-03
Attending: INTERNAL MEDICINE
Payer: MEDICARE

## 2021-06-03 VITALS
SYSTOLIC BLOOD PRESSURE: 175 MMHG | DIASTOLIC BLOOD PRESSURE: 74 MMHG | RESPIRATION RATE: 18 BRPM | TEMPERATURE: 98 F | HEART RATE: 72 BPM

## 2021-06-03 DIAGNOSIS — D46.C MDS (MYELODYSPLASTIC SYNDROME) WITH 5Q DELETION: Primary | ICD-10-CM

## 2021-06-03 PROCEDURE — 63600175 PHARM REV CODE 636 W HCPCS: Mod: JG | Performed by: INTERNAL MEDICINE

## 2021-06-03 PROCEDURE — 96401 CHEMO ANTI-NEOPL SQ/IM: CPT

## 2021-06-03 RX ORDER — AZACITIDINE 100 MG/1
75 INJECTION, POWDER, LYOPHILIZED, FOR SOLUTION INTRAVENOUS; SUBCUTANEOUS
Status: COMPLETED | OUTPATIENT
Start: 2021-06-03 | End: 2021-06-03

## 2021-06-03 RX ADMIN — AZACITIDINE 145 MG: 100 INJECTION, POWDER, LYOPHILIZED, FOR SOLUTION INTRAVENOUS; SUBCUTANEOUS at 11:06

## 2021-06-04 ENCOUNTER — INFUSION (OUTPATIENT)
Dept: INFUSION THERAPY | Facility: HOSPITAL | Age: 71
End: 2021-06-04
Attending: NURSE PRACTITIONER
Payer: MEDICARE

## 2021-06-04 ENCOUNTER — PATIENT MESSAGE (OUTPATIENT)
Dept: HEMATOLOGY/ONCOLOGY | Facility: CLINIC | Age: 71
End: 2021-06-04

## 2021-06-04 VITALS
RESPIRATION RATE: 18 BRPM | SYSTOLIC BLOOD PRESSURE: 148 MMHG | HEART RATE: 68 BPM | TEMPERATURE: 98 F | DIASTOLIC BLOOD PRESSURE: 65 MMHG

## 2021-06-04 DIAGNOSIS — E87.6 HYPOKALEMIA: ICD-10-CM

## 2021-06-04 DIAGNOSIS — D46.C MDS (MYELODYSPLASTIC SYNDROME) WITH 5Q DELETION: Primary | ICD-10-CM

## 2021-06-04 PROCEDURE — 63600175 PHARM REV CODE 636 W HCPCS: Mod: JW,JG | Performed by: INTERNAL MEDICINE

## 2021-06-04 PROCEDURE — 96401 CHEMO ANTI-NEOPL SQ/IM: CPT

## 2021-06-04 RX ORDER — ONDANSETRON 4 MG/1
8 TABLET, ORALLY DISINTEGRATING ORAL ONCE
Status: DISCONTINUED | OUTPATIENT
Start: 2021-06-04 | End: 2021-06-04 | Stop reason: HOSPADM

## 2021-06-04 RX ORDER — AZACITIDINE 100 MG/1
75 INJECTION, POWDER, LYOPHILIZED, FOR SOLUTION INTRAVENOUS; SUBCUTANEOUS
Status: COMPLETED | OUTPATIENT
Start: 2021-06-04 | End: 2021-06-04

## 2021-06-04 RX ADMIN — AZACITIDINE 145 MG: 100 INJECTION, POWDER, LYOPHILIZED, FOR SOLUTION INTRAVENOUS; SUBCUTANEOUS at 11:06

## 2021-06-09 ENCOUNTER — LAB VISIT (OUTPATIENT)
Dept: LAB | Facility: HOSPITAL | Age: 71
End: 2021-06-09
Attending: INTERNAL MEDICINE
Payer: MEDICARE

## 2021-06-09 ENCOUNTER — SPECIALTY PHARMACY (OUTPATIENT)
Dept: PHARMACY | Facility: CLINIC | Age: 71
End: 2021-06-09

## 2021-06-09 DIAGNOSIS — D46.C MDS (MYELODYSPLASTIC SYNDROME) WITH 5Q DELETION: ICD-10-CM

## 2021-06-09 DIAGNOSIS — D64.9 ANEMIA REQUIRING TRANSFUSIONS: ICD-10-CM

## 2021-06-09 LAB
ABO + RH BLD: NORMAL
ANISOCYTOSIS BLD QL SMEAR: SLIGHT
BASOPHILS # BLD AUTO: 0.02 K/UL (ref 0–0.2)
BASOPHILS NFR BLD: 0.6 % (ref 0–1.9)
BLD GP AB SCN CELLS X3 SERPL QL: NORMAL
DIFFERENTIAL METHOD: ABNORMAL
EOSINOPHIL # BLD AUTO: 0.2 K/UL (ref 0–0.5)
EOSINOPHIL NFR BLD: 5.1 % (ref 0–8)
ERYTHROCYTE [DISTWIDTH] IN BLOOD BY AUTOMATED COUNT: 18 % (ref 11.5–14.5)
GIANT PLATELETS BLD QL SMEAR: PRESENT
HCT VFR BLD AUTO: 21.7 % (ref 37–48.5)
HGB BLD-MCNC: 7.1 G/DL (ref 12–16)
HYPOCHROMIA BLD QL SMEAR: ABNORMAL
IMM GRANULOCYTES # BLD AUTO: 0.04 K/UL (ref 0–0.04)
IMM GRANULOCYTES NFR BLD AUTO: 1.1 % (ref 0–0.5)
LYMPHOCYTES # BLD AUTO: 1.1 K/UL (ref 1–4.8)
LYMPHOCYTES NFR BLD: 30.1 % (ref 18–48)
MCH RBC QN AUTO: 32 PG (ref 27–31)
MCHC RBC AUTO-ENTMCNC: 32.7 G/DL (ref 32–36)
MCV RBC AUTO: 98 FL (ref 82–98)
MONOCYTES # BLD AUTO: 0.1 K/UL (ref 0.3–1)
MONOCYTES NFR BLD: 2.8 % (ref 4–15)
NEUTROPHILS # BLD AUTO: 2.1 K/UL (ref 1.8–7.7)
NEUTROPHILS NFR BLD: 60.3 % (ref 38–73)
NRBC BLD-RTO: 0 /100 WBC
OVALOCYTES BLD QL SMEAR: ABNORMAL
PLATELET # BLD AUTO: 199 K/UL (ref 150–450)
PLATELET BLD QL SMEAR: ABNORMAL
PMV BLD AUTO: 11.3 FL (ref 9.2–12.9)
POIKILOCYTOSIS BLD QL SMEAR: SLIGHT
RBC # BLD AUTO: 2.22 M/UL (ref 4–5.4)
WBC # BLD AUTO: 3.52 K/UL (ref 3.9–12.7)

## 2021-06-09 PROCEDURE — 86900 BLOOD TYPING SEROLOGIC ABO: CPT | Performed by: INTERNAL MEDICINE

## 2021-06-09 PROCEDURE — 85025 COMPLETE CBC W/AUTO DIFF WBC: CPT | Performed by: INTERNAL MEDICINE

## 2021-06-09 PROCEDURE — 36415 COLL VENOUS BLD VENIPUNCTURE: CPT | Performed by: INTERNAL MEDICINE

## 2021-06-10 DIAGNOSIS — D64.9 ANEMIA REQUIRING TRANSFUSIONS: Primary | ICD-10-CM

## 2021-06-10 RX ORDER — ACETAMINOPHEN 325 MG/1
650 TABLET ORAL
Status: CANCELLED | OUTPATIENT
Start: 2021-06-10

## 2021-06-10 RX ORDER — DIPHENHYDRAMINE HCL 25 MG
25 CAPSULE ORAL
Status: CANCELLED | OUTPATIENT
Start: 2021-06-10

## 2021-06-10 RX ORDER — HYDROCODONE BITARTRATE AND ACETAMINOPHEN 500; 5 MG/1; MG/1
TABLET ORAL ONCE
Status: CANCELLED | OUTPATIENT
Start: 2021-06-10 | End: 2021-06-10

## 2021-06-11 ENCOUNTER — INFUSION (OUTPATIENT)
Dept: INFUSION THERAPY | Facility: HOSPITAL | Age: 71
End: 2021-06-11
Payer: MEDICARE

## 2021-06-11 VITALS
HEART RATE: 72 BPM | TEMPERATURE: 98 F | OXYGEN SATURATION: 95 % | DIASTOLIC BLOOD PRESSURE: 75 MMHG | RESPIRATION RATE: 18 BRPM | SYSTOLIC BLOOD PRESSURE: 178 MMHG

## 2021-06-11 DIAGNOSIS — D64.9 ANEMIA REQUIRING TRANSFUSIONS: ICD-10-CM

## 2021-06-11 PROCEDURE — P9040 RBC LEUKOREDUCED IRRADIATED: HCPCS | Performed by: NURSE PRACTITIONER

## 2021-06-11 PROCEDURE — 86902 BLOOD TYPE ANTIGEN DONOR EA: CPT | Mod: 59 | Performed by: NURSE PRACTITIONER

## 2021-06-11 PROCEDURE — 25000003 PHARM REV CODE 250: Performed by: NURSE PRACTITIONER

## 2021-06-11 PROCEDURE — 86922 COMPATIBILITY TEST ANTIGLOB: CPT | Performed by: NURSE PRACTITIONER

## 2021-06-11 PROCEDURE — 36430 TRANSFUSION BLD/BLD COMPNT: CPT

## 2021-06-11 RX ORDER — ACETAMINOPHEN 325 MG/1
650 TABLET ORAL
Status: COMPLETED | OUTPATIENT
Start: 2021-06-11 | End: 2021-06-11

## 2021-06-11 RX ORDER — DIPHENHYDRAMINE HCL 25 MG
25 CAPSULE ORAL
Status: COMPLETED | OUTPATIENT
Start: 2021-06-11 | End: 2021-06-11

## 2021-06-11 RX ORDER — HYDROCODONE BITARTRATE AND ACETAMINOPHEN 500; 5 MG/1; MG/1
TABLET ORAL ONCE
Status: COMPLETED | OUTPATIENT
Start: 2021-06-11 | End: 2021-06-11

## 2021-06-11 RX ADMIN — SODIUM CHLORIDE: 0.9 INJECTION, SOLUTION INTRAVENOUS at 02:06

## 2021-06-11 RX ADMIN — DIPHENHYDRAMINE HYDROCHLORIDE 25 MG: 25 CAPSULE ORAL at 02:06

## 2021-06-11 RX ADMIN — ACETAMINOPHEN 650 MG: 325 TABLET ORAL at 02:06

## 2021-06-15 DIAGNOSIS — E87.6 HYPOKALEMIA: ICD-10-CM

## 2021-06-16 ENCOUNTER — PATIENT MESSAGE (OUTPATIENT)
Dept: ADMINISTRATIVE | Facility: HOSPITAL | Age: 71
End: 2021-06-16

## 2021-06-16 ENCOUNTER — PATIENT MESSAGE (OUTPATIENT)
Dept: HEMATOLOGY/ONCOLOGY | Facility: CLINIC | Age: 71
End: 2021-06-16

## 2021-06-16 ENCOUNTER — LAB VISIT (OUTPATIENT)
Dept: LAB | Facility: HOSPITAL | Age: 71
End: 2021-06-16
Attending: INTERNAL MEDICINE
Payer: MEDICARE

## 2021-06-16 ENCOUNTER — PATIENT OUTREACH (OUTPATIENT)
Dept: ADMINISTRATIVE | Facility: HOSPITAL | Age: 71
End: 2021-06-16

## 2021-06-16 DIAGNOSIS — D46.C MDS (MYELODYSPLASTIC SYNDROME) WITH 5Q DELETION: ICD-10-CM

## 2021-06-16 DIAGNOSIS — D64.9 ANEMIA REQUIRING TRANSFUSIONS: ICD-10-CM

## 2021-06-16 LAB
ABO + RH BLD: NORMAL
BASOPHILS # BLD AUTO: 0.03 K/UL (ref 0–0.2)
BASOPHILS NFR BLD: 0.9 % (ref 0–1.9)
BLD GP AB SCN CELLS X3 SERPL QL: NORMAL
DIFFERENTIAL METHOD: ABNORMAL
EOSINOPHIL # BLD AUTO: 0.1 K/UL (ref 0–0.5)
EOSINOPHIL NFR BLD: 4.1 % (ref 0–8)
ERYTHROCYTE [DISTWIDTH] IN BLOOD BY AUTOMATED COUNT: 16.9 % (ref 11.5–14.5)
HCT VFR BLD AUTO: 25.2 % (ref 37–48.5)
HGB BLD-MCNC: 8.2 G/DL (ref 12–16)
IMM GRANULOCYTES # BLD AUTO: 0.06 K/UL (ref 0–0.04)
IMM GRANULOCYTES NFR BLD AUTO: 1.7 % (ref 0–0.5)
LYMPHOCYTES # BLD AUTO: 1.2 K/UL (ref 1–4.8)
LYMPHOCYTES NFR BLD: 33.9 % (ref 18–48)
MCH RBC QN AUTO: 31.7 PG (ref 27–31)
MCHC RBC AUTO-ENTMCNC: 32.5 G/DL (ref 32–36)
MCV RBC AUTO: 97 FL (ref 82–98)
MONOCYTES # BLD AUTO: 0.1 K/UL (ref 0.3–1)
MONOCYTES NFR BLD: 2.3 % (ref 4–15)
NEUTROPHILS # BLD AUTO: 2 K/UL (ref 1.8–7.7)
NEUTROPHILS NFR BLD: 57.1 % (ref 38–73)
NRBC BLD-RTO: 0 /100 WBC
PLATELET # BLD AUTO: 124 K/UL (ref 150–450)
PMV BLD AUTO: 11.7 FL (ref 9.2–12.9)
RBC # BLD AUTO: 2.59 M/UL (ref 4–5.4)
WBC # BLD AUTO: 3.45 K/UL (ref 3.9–12.7)

## 2021-06-16 PROCEDURE — 85025 COMPLETE CBC W/AUTO DIFF WBC: CPT | Performed by: INTERNAL MEDICINE

## 2021-06-16 PROCEDURE — 86900 BLOOD TYPING SEROLOGIC ABO: CPT | Performed by: INTERNAL MEDICINE

## 2021-06-16 PROCEDURE — 36415 COLL VENOUS BLD VENIPUNCTURE: CPT | Performed by: INTERNAL MEDICINE

## 2021-06-16 RX ORDER — POTASSIUM CHLORIDE 20 MEQ/1
20 TABLET, EXTENDED RELEASE ORAL DAILY
Qty: 30 TABLET | Refills: 4 | Status: SHIPPED | OUTPATIENT
Start: 2021-06-16

## 2021-06-22 ENCOUNTER — PATIENT MESSAGE (OUTPATIENT)
Dept: HEMATOLOGY/ONCOLOGY | Facility: CLINIC | Age: 71
End: 2021-06-22

## 2021-06-22 DIAGNOSIS — M54.31 BILATERAL SCIATICA: ICD-10-CM

## 2021-06-22 DIAGNOSIS — M54.32 BILATERAL SCIATICA: ICD-10-CM

## 2021-06-22 RX ORDER — GABAPENTIN 100 MG/1
100 CAPSULE ORAL 2 TIMES DAILY
Qty: 60 CAPSULE | Refills: 2 | Status: SHIPPED | OUTPATIENT
Start: 2021-06-22 | End: 2021-01-01 | Stop reason: SDUPTHER

## 2021-06-23 ENCOUNTER — PATIENT MESSAGE (OUTPATIENT)
Dept: HEMATOLOGY/ONCOLOGY | Facility: CLINIC | Age: 71
End: 2021-06-23

## 2021-06-23 ENCOUNTER — LAB VISIT (OUTPATIENT)
Dept: LAB | Facility: HOSPITAL | Age: 71
End: 2021-06-23
Attending: INTERNAL MEDICINE
Payer: MEDICARE

## 2021-06-23 DIAGNOSIS — D46.C MDS (MYELODYSPLASTIC SYNDROME) WITH 5Q DELETION: ICD-10-CM

## 2021-06-23 DIAGNOSIS — D64.9 ANEMIA REQUIRING TRANSFUSIONS: ICD-10-CM

## 2021-06-23 LAB
ABO + RH BLD: NORMAL
ANISOCYTOSIS BLD QL SMEAR: SLIGHT
BASOPHILS NFR BLD: 1 % (ref 0–1.9)
BLD GP AB SCN CELLS X3 SERPL QL: NORMAL
DIFFERENTIAL METHOD: ABNORMAL
EOSINOPHIL NFR BLD: 4 % (ref 0–8)
ERYTHROCYTE [DISTWIDTH] IN BLOOD BY AUTOMATED COUNT: 17.7 % (ref 11.5–14.5)
GIANT PLATELETS BLD QL SMEAR: PRESENT
HCT VFR BLD AUTO: 22 % (ref 37–48.5)
HGB BLD-MCNC: 7.2 G/DL (ref 12–16)
IMM GRANULOCYTES # BLD AUTO: ABNORMAL K/UL (ref 0–0.04)
IMM GRANULOCYTES NFR BLD AUTO: ABNORMAL % (ref 0–0.5)
LYMPHOCYTES NFR BLD: 37 % (ref 18–48)
MCH RBC QN AUTO: 32 PG (ref 27–31)
MCHC RBC AUTO-ENTMCNC: 32.7 G/DL (ref 32–36)
MCV RBC AUTO: 98 FL (ref 82–98)
MONOCYTES NFR BLD: 2 % (ref 4–15)
NEUTROPHILS NFR BLD: 56 % (ref 38–73)
NRBC BLD-RTO: 0 /100 WBC
PLATELET # BLD AUTO: 237 K/UL (ref 150–450)
PLATELET BLD QL SMEAR: ABNORMAL
PMV BLD AUTO: 11.4 FL (ref 9.2–12.9)
RBC # BLD AUTO: 2.25 M/UL (ref 4–5.4)
WBC # BLD AUTO: 1.94 K/UL (ref 3.9–12.7)

## 2021-06-23 PROCEDURE — 86900 BLOOD TYPING SEROLOGIC ABO: CPT | Performed by: INTERNAL MEDICINE

## 2021-06-23 PROCEDURE — 85027 COMPLETE CBC AUTOMATED: CPT | Performed by: INTERNAL MEDICINE

## 2021-06-23 PROCEDURE — 85007 BL SMEAR W/DIFF WBC COUNT: CPT | Performed by: INTERNAL MEDICINE

## 2021-06-24 DIAGNOSIS — D46.C MDS (MYELODYSPLASTIC SYNDROME) WITH 5Q DELETION: Primary | ICD-10-CM

## 2021-06-24 DIAGNOSIS — D64.9 ANEMIA REQUIRING TRANSFUSIONS: ICD-10-CM

## 2021-06-24 RX ORDER — AZACITIDINE 100 MG/1
75 INJECTION, POWDER, LYOPHILIZED, FOR SOLUTION INTRAVENOUS; SUBCUTANEOUS
Status: CANCELLED | OUTPATIENT
Start: 2021-01-01

## 2021-06-24 RX ORDER — ONDANSETRON 8 MG/1
8 TABLET, ORALLY DISINTEGRATING ORAL ONCE
Status: CANCELLED | OUTPATIENT
Start: 2021-01-01 | End: 2021-01-01

## 2021-06-24 RX ORDER — DIPHENHYDRAMINE HCL 25 MG
25 CAPSULE ORAL
Status: CANCELLED | OUTPATIENT
Start: 2021-06-24

## 2021-06-24 RX ORDER — HYDROCODONE BITARTRATE AND ACETAMINOPHEN 500; 5 MG/1; MG/1
TABLET ORAL ONCE
Status: CANCELLED | OUTPATIENT
Start: 2021-06-24 | End: 2021-06-24

## 2021-06-24 RX ORDER — ACETAMINOPHEN 325 MG/1
650 TABLET ORAL
Status: CANCELLED | OUTPATIENT
Start: 2021-06-24

## 2021-06-25 ENCOUNTER — INFUSION (OUTPATIENT)
Dept: INFUSION THERAPY | Facility: HOSPITAL | Age: 71
End: 2021-06-25
Payer: MEDICARE

## 2021-06-25 VITALS
RESPIRATION RATE: 18 BRPM | OXYGEN SATURATION: 97 % | SYSTOLIC BLOOD PRESSURE: 172 MMHG | DIASTOLIC BLOOD PRESSURE: 70 MMHG | HEART RATE: 61 BPM | TEMPERATURE: 98 F

## 2021-06-25 DIAGNOSIS — D64.9 ANEMIA REQUIRING TRANSFUSIONS: ICD-10-CM

## 2021-06-25 DIAGNOSIS — D46.9 MDS (MYELODYSPLASTIC SYNDROME): ICD-10-CM

## 2021-06-25 DIAGNOSIS — D46.C MDS (MYELODYSPLASTIC SYNDROME) WITH 5Q DELETION: Primary | ICD-10-CM

## 2021-06-25 PROCEDURE — 63600175 PHARM REV CODE 636 W HCPCS: Performed by: INTERNAL MEDICINE

## 2021-06-25 PROCEDURE — 25000003 PHARM REV CODE 250: Performed by: NURSE PRACTITIONER

## 2021-06-25 PROCEDURE — A4216 STERILE WATER/SALINE, 10 ML: HCPCS | Performed by: INTERNAL MEDICINE

## 2021-06-25 PROCEDURE — 36430 TRANSFUSION BLD/BLD COMPNT: CPT

## 2021-06-25 PROCEDURE — 25000003 PHARM REV CODE 250: Performed by: INTERNAL MEDICINE

## 2021-06-25 RX ORDER — HYDROCODONE BITARTRATE AND ACETAMINOPHEN 500; 5 MG/1; MG/1
TABLET ORAL ONCE
Status: COMPLETED | OUTPATIENT
Start: 2021-06-25 | End: 2021-06-25

## 2021-06-25 RX ORDER — ACETAMINOPHEN 325 MG/1
650 TABLET ORAL
Status: COMPLETED | OUTPATIENT
Start: 2021-06-25 | End: 2021-06-25

## 2021-06-25 RX ORDER — HEPARIN 100 UNIT/ML
500 SYRINGE INTRAVENOUS
Status: COMPLETED | OUTPATIENT
Start: 2021-06-25 | End: 2021-06-25

## 2021-06-25 RX ORDER — SODIUM CHLORIDE 0.9 % (FLUSH) 0.9 %
10 SYRINGE (ML) INJECTION
Status: COMPLETED | OUTPATIENT
Start: 2021-06-25 | End: 2021-06-25

## 2021-06-25 RX ORDER — DIPHENHYDRAMINE HCL 25 MG
25 CAPSULE ORAL
Status: COMPLETED | OUTPATIENT
Start: 2021-06-25 | End: 2021-06-25

## 2021-06-25 RX ADMIN — SODIUM CHLORIDE: 0.9 INJECTION, SOLUTION INTRAVENOUS at 01:06

## 2021-06-25 RX ADMIN — ACETAMINOPHEN 650 MG: 325 TABLET ORAL at 03:06

## 2021-06-25 RX ADMIN — Medication 10 ML: at 05:06

## 2021-06-25 RX ADMIN — DIPHENHYDRAMINE HYDROCHLORIDE 25 MG: 25 CAPSULE ORAL at 03:06

## 2021-06-25 RX ADMIN — HEPARIN 500 UNITS: 100 SYRINGE at 05:06

## 2021-07-17 PROBLEM — N18.30 STAGE 3 CHRONIC KIDNEY DISEASE: Status: RESOLVED | Noted: 2017-01-16 | Resolved: 2021-01-01

## 2021-07-17 PROBLEM — E11.9 CONTROLLED TYPE 2 DIABETES MELLITUS WITHOUT COMPLICATION, WITHOUT LONG-TERM CURRENT USE OF INSULIN: Status: ACTIVE | Noted: 2021-01-01

## 2021-07-17 PROBLEM — F17.200 SMOKER: Status: RESOLVED | Noted: 2020-10-22 | Resolved: 2021-01-01

## 2021-09-27 PROBLEM — Z91.81 HISTORY OF FALL: Status: ACTIVE | Noted: 2021-01-01

## 2021-12-22 ENCOUNTER — PATIENT OUTREACH (OUTPATIENT)
Dept: ADMINISTRATIVE | Facility: HOSPITAL | Age: 71
End: 2021-12-22

## 2022-01-01 ENCOUNTER — PATIENT MESSAGE (OUTPATIENT)
Dept: HEMATOLOGY/ONCOLOGY | Facility: CLINIC | Age: 72
End: 2022-01-01
Payer: MEDICARE

## 2022-01-01 ENCOUNTER — INFUSION (OUTPATIENT)
Dept: INFUSION THERAPY | Facility: HOSPITAL | Age: 72
End: 2022-01-01
Payer: MEDICARE

## 2022-01-01 ENCOUNTER — TELEPHONE (OUTPATIENT)
Dept: INTERNAL MEDICINE | Facility: CLINIC | Age: 72
End: 2022-01-01

## 2022-01-01 ENCOUNTER — OFFICE VISIT (OUTPATIENT)
Dept: INTERNAL MEDICINE | Facility: CLINIC | Age: 72
End: 2022-01-01
Payer: MEDICARE

## 2022-01-01 ENCOUNTER — LAB VISIT (OUTPATIENT)
Dept: LAB | Facility: HOSPITAL | Age: 72
End: 2022-01-01
Payer: MEDICARE

## 2022-01-01 ENCOUNTER — HOSPITAL ENCOUNTER (OUTPATIENT)
Facility: HOSPITAL | Age: 72
Discharge: HOME | End: 2022-02-16
Attending: EMERGENCY MEDICINE | Admitting: INTERNAL MEDICINE
Payer: MEDICARE

## 2022-01-01 ENCOUNTER — PATIENT MESSAGE (OUTPATIENT)
Dept: HEMATOLOGY/ONCOLOGY | Facility: CLINIC | Age: 72
End: 2022-01-01

## 2022-01-01 ENCOUNTER — PATIENT MESSAGE (OUTPATIENT)
Dept: ADMINISTRATIVE | Facility: OTHER | Age: 72
End: 2022-01-01
Payer: MEDICARE

## 2022-01-01 ENCOUNTER — SPECIALTY PHARMACY (OUTPATIENT)
Dept: PHARMACY | Facility: CLINIC | Age: 72
End: 2022-01-01
Payer: MEDICARE

## 2022-01-01 ENCOUNTER — DOCUMENTATION ONLY (OUTPATIENT)
Dept: HEMATOLOGY/ONCOLOGY | Facility: CLINIC | Age: 72
End: 2022-01-01
Payer: MEDICARE

## 2022-01-01 ENCOUNTER — DOCUMENTATION ONLY (OUTPATIENT)
Dept: REHABILITATION | Facility: HOSPITAL | Age: 72
End: 2022-01-01
Payer: MEDICARE

## 2022-01-01 ENCOUNTER — NURSE TRIAGE (OUTPATIENT)
Dept: ADMINISTRATIVE | Facility: CLINIC | Age: 72
End: 2022-01-01
Payer: MEDICARE

## 2022-01-01 ENCOUNTER — OFFICE VISIT (OUTPATIENT)
Dept: HEMATOLOGY/ONCOLOGY | Facility: CLINIC | Age: 72
End: 2022-01-01
Payer: MEDICARE

## 2022-01-01 ENCOUNTER — PATIENT MESSAGE (OUTPATIENT)
Dept: ADMINISTRATIVE | Facility: HOSPITAL | Age: 72
End: 2022-01-01
Payer: MEDICARE

## 2022-01-01 ENCOUNTER — LAB VISIT (OUTPATIENT)
Dept: LAB | Facility: HOSPITAL | Age: 72
End: 2022-01-01
Attending: INTERNAL MEDICINE
Payer: MEDICARE

## 2022-01-01 ENCOUNTER — PATIENT MESSAGE (OUTPATIENT)
Dept: INTERNAL MEDICINE | Facility: CLINIC | Age: 72
End: 2022-01-01
Payer: MEDICARE

## 2022-01-01 ENCOUNTER — TELEPHONE (OUTPATIENT)
Dept: HEMATOLOGY/ONCOLOGY | Facility: CLINIC | Age: 72
End: 2022-01-01
Payer: MEDICARE

## 2022-01-01 ENCOUNTER — INFUSION (OUTPATIENT)
Dept: INFUSION THERAPY | Facility: HOSPITAL | Age: 72
DRG: 189 | End: 2022-01-01
Payer: MEDICARE

## 2022-01-01 ENCOUNTER — HOSPITAL ENCOUNTER (INPATIENT)
Facility: HOSPITAL | Age: 72
LOS: 4 days | Discharge: HOME | DRG: 189 | End: 2022-04-27
Attending: EMERGENCY MEDICINE | Admitting: INTERNAL MEDICINE
Payer: MEDICARE

## 2022-01-01 ENCOUNTER — PATIENT MESSAGE (OUTPATIENT)
Dept: CARDIOLOGY | Facility: CLINIC | Age: 72
End: 2022-01-01
Payer: MEDICARE

## 2022-01-01 ENCOUNTER — TELEPHONE (OUTPATIENT)
Dept: INFUSION THERAPY | Facility: HOSPITAL | Age: 72
End: 2022-01-01
Payer: MEDICARE

## 2022-01-01 ENCOUNTER — HOSPITAL ENCOUNTER (INPATIENT)
Facility: HOSPITAL | Age: 72
LOS: 26 days | Discharge: ANOTHER HOSPITAL | DRG: 189 | End: 2022-06-14
Attending: HOSPITALIST | Admitting: HOSPITALIST
Payer: MEDICARE

## 2022-01-01 ENCOUNTER — EXTERNAL HOSPITAL ADMISSION (OUTPATIENT)
Dept: SKILLED NURSING FACILITY | Facility: HOSPITAL | Age: 72
End: 2022-01-01
Payer: MEDICARE

## 2022-01-01 ENCOUNTER — HOSPITAL ENCOUNTER (EMERGENCY)
Facility: HOSPITAL | Age: 72
Discharge: HOME | End: 2022-06-21
Attending: EMERGENCY MEDICINE
Payer: MEDICARE

## 2022-01-01 ENCOUNTER — HOSPITAL ENCOUNTER (OUTPATIENT)
Dept: RADIOLOGY | Facility: HOSPITAL | Age: 72
Discharge: HOME | End: 2022-02-17
Attending: NURSE PRACTITIONER
Payer: MEDICARE

## 2022-01-01 ENCOUNTER — TELEPHONE (OUTPATIENT)
Dept: CARDIOLOGY | Facility: CLINIC | Age: 72
End: 2022-01-01
Payer: MEDICARE

## 2022-01-01 ENCOUNTER — HOSPITAL ENCOUNTER (INPATIENT)
Facility: HOSPITAL | Age: 72
LOS: 17 days | Discharge: NURSING, REHAB OR LONG-TERM CARE | DRG: 189 | End: 2022-05-19
Attending: EMERGENCY MEDICINE | Admitting: INTERNAL MEDICINE
Payer: MEDICARE

## 2022-01-01 ENCOUNTER — HOSPITAL ENCOUNTER (OUTPATIENT)
Facility: HOSPITAL | Age: 72
Discharge: OTHER INSTITUTION | End: 2022-06-16
Attending: EMERGENCY MEDICINE | Admitting: INTERNAL MEDICINE
Payer: MEDICARE

## 2022-01-01 ENCOUNTER — CLINICAL SUPPORT (OUTPATIENT)
Dept: REHABILITATION | Facility: HOSPITAL | Age: 72
End: 2022-01-01
Payer: MEDICARE

## 2022-01-01 ENCOUNTER — PATIENT MESSAGE (OUTPATIENT)
Dept: REHABILITATION | Facility: HOSPITAL | Age: 72
End: 2022-01-01
Payer: MEDICARE

## 2022-01-01 ENCOUNTER — TELEPHONE (OUTPATIENT)
Dept: INTERNAL MEDICINE | Facility: CLINIC | Age: 72
End: 2022-01-01
Payer: MEDICARE

## 2022-01-01 ENCOUNTER — SPECIALTY PHARMACY (OUTPATIENT)
Dept: PHARMACY | Facility: CLINIC | Age: 72
End: 2022-01-01

## 2022-01-01 ENCOUNTER — EXTERNAL HOME HEALTH (OUTPATIENT)
Dept: HOME HEALTH SERVICES | Facility: HOSPITAL | Age: 72
End: 2022-01-01
Payer: MEDICARE

## 2022-01-01 ENCOUNTER — RESEARCH ENCOUNTER (OUTPATIENT)
Dept: RESEARCH | Facility: HOSPITAL | Age: 72
End: 2022-01-01
Payer: MEDICARE

## 2022-01-01 ENCOUNTER — HOSPITAL ENCOUNTER (EMERGENCY)
Facility: HOSPITAL | Age: 72
End: 2022-06-28
Attending: EMERGENCY MEDICINE | Admitting: PSYCHIATRY & NEUROLOGY
Payer: MEDICARE

## 2022-01-01 VITALS
TEMPERATURE: 98 F | BODY MASS INDEX: 25.92 KG/M2 | DIASTOLIC BLOOD PRESSURE: 63 MMHG | OXYGEN SATURATION: 95 % | WEIGHT: 155.56 LBS | SYSTOLIC BLOOD PRESSURE: 140 MMHG | HEIGHT: 65 IN | HEART RATE: 71 BPM | RESPIRATION RATE: 18 BRPM

## 2022-01-01 VITALS
RESPIRATION RATE: 18 BRPM | DIASTOLIC BLOOD PRESSURE: 60 MMHG | RESPIRATION RATE: 18 BRPM | SYSTOLIC BLOOD PRESSURE: 121 MMHG | HEART RATE: 78 BPM | TEMPERATURE: 98 F | HEART RATE: 71 BPM | DIASTOLIC BLOOD PRESSURE: 54 MMHG | SYSTOLIC BLOOD PRESSURE: 124 MMHG

## 2022-01-01 VITALS
TEMPERATURE: 98 F | DIASTOLIC BLOOD PRESSURE: 56 MMHG | HEIGHT: 65 IN | HEART RATE: 90 BPM | WEIGHT: 156 LBS | OXYGEN SATURATION: 96 % | BODY MASS INDEX: 25.99 KG/M2 | SYSTOLIC BLOOD PRESSURE: 116 MMHG | RESPIRATION RATE: 16 BRPM

## 2022-01-01 VITALS
BODY MASS INDEX: 25.92 KG/M2 | SYSTOLIC BLOOD PRESSURE: 112 MMHG | HEART RATE: 73 BPM | HEIGHT: 65 IN | SYSTOLIC BLOOD PRESSURE: 168 MMHG | DIASTOLIC BLOOD PRESSURE: 42 MMHG | BODY MASS INDEX: 25.57 KG/M2 | RESPIRATION RATE: 18 BRPM | DIASTOLIC BLOOD PRESSURE: 70 MMHG | WEIGHT: 153.69 LBS | WEIGHT: 155.56 LBS | HEART RATE: 59 BPM | OXYGEN SATURATION: 98 % | OXYGEN SATURATION: 95 % | TEMPERATURE: 99 F

## 2022-01-01 VITALS
DIASTOLIC BLOOD PRESSURE: 69 MMHG | TEMPERATURE: 98 F | HEART RATE: 82 BPM | BODY MASS INDEX: 27 KG/M2 | HEIGHT: 65 IN | RESPIRATION RATE: 18 BRPM | OXYGEN SATURATION: 95 % | WEIGHT: 162.06 LBS | SYSTOLIC BLOOD PRESSURE: 152 MMHG

## 2022-01-01 VITALS
DIASTOLIC BLOOD PRESSURE: 65 MMHG | HEART RATE: 79 BPM | HEART RATE: 69 BPM | DIASTOLIC BLOOD PRESSURE: 56 MMHG | DIASTOLIC BLOOD PRESSURE: 42 MMHG | RESPIRATION RATE: 18 BRPM | SYSTOLIC BLOOD PRESSURE: 97 MMHG | TEMPERATURE: 98 F | RESPIRATION RATE: 18 BRPM | RESPIRATION RATE: 18 BRPM | TEMPERATURE: 98 F | SYSTOLIC BLOOD PRESSURE: 120 MMHG | SYSTOLIC BLOOD PRESSURE: 145 MMHG | TEMPERATURE: 98 F | HEART RATE: 78 BPM

## 2022-01-01 VITALS
TEMPERATURE: 98 F | WEIGHT: 157.06 LBS | DIASTOLIC BLOOD PRESSURE: 63 MMHG | HEIGHT: 65 IN | SYSTOLIC BLOOD PRESSURE: 130 MMHG | SYSTOLIC BLOOD PRESSURE: 132 MMHG | SYSTOLIC BLOOD PRESSURE: 119 MMHG | TEMPERATURE: 98 F | DIASTOLIC BLOOD PRESSURE: 59 MMHG | HEART RATE: 78 BPM | BODY MASS INDEX: 26.17 KG/M2 | RESPIRATION RATE: 16 BRPM | RESPIRATION RATE: 18 BRPM | HEART RATE: 71 BPM | RESPIRATION RATE: 18 BRPM | OXYGEN SATURATION: 96 % | HEART RATE: 78 BPM | DIASTOLIC BLOOD PRESSURE: 63 MMHG

## 2022-01-01 VITALS
BODY MASS INDEX: 26.15 KG/M2 | HEART RATE: 90 BPM | OXYGEN SATURATION: 95 % | HEART RATE: 73 BPM | DIASTOLIC BLOOD PRESSURE: 51 MMHG | OXYGEN SATURATION: 91 % | TEMPERATURE: 99 F | RESPIRATION RATE: 16 BRPM | TEMPERATURE: 99 F | DIASTOLIC BLOOD PRESSURE: 47 MMHG | SYSTOLIC BLOOD PRESSURE: 115 MMHG | RESPIRATION RATE: 18 BRPM | BODY MASS INDEX: 24.63 KG/M2 | RESPIRATION RATE: 18 BRPM | HEIGHT: 65 IN | WEIGHT: 156.94 LBS | HEART RATE: 72 BPM | DIASTOLIC BLOOD PRESSURE: 51 MMHG | OXYGEN SATURATION: 95 % | TEMPERATURE: 98 F | SYSTOLIC BLOOD PRESSURE: 104 MMHG | HEIGHT: 66 IN | WEIGHT: 153.25 LBS | SYSTOLIC BLOOD PRESSURE: 98 MMHG

## 2022-01-01 VITALS
HEART RATE: 71 BPM | RESPIRATION RATE: 18 BRPM | DIASTOLIC BLOOD PRESSURE: 62 MMHG | SYSTOLIC BLOOD PRESSURE: 132 MMHG | TEMPERATURE: 97 F

## 2022-01-01 VITALS
TEMPERATURE: 98 F | RESPIRATION RATE: 16 BRPM | RESPIRATION RATE: 17 BRPM | TEMPERATURE: 98 F | SYSTOLIC BLOOD PRESSURE: 111 MMHG | HEART RATE: 72 BPM | SYSTOLIC BLOOD PRESSURE: 161 MMHG | HEART RATE: 79 BPM | DIASTOLIC BLOOD PRESSURE: 72 MMHG | OXYGEN SATURATION: 96 % | DIASTOLIC BLOOD PRESSURE: 58 MMHG

## 2022-01-01 VITALS
DIASTOLIC BLOOD PRESSURE: 62 MMHG | TEMPERATURE: 99 F | HEIGHT: 65 IN | SYSTOLIC BLOOD PRESSURE: 136 MMHG | OXYGEN SATURATION: 97 % | RESPIRATION RATE: 16 BRPM | HEART RATE: 85 BPM | DIASTOLIC BLOOD PRESSURE: 70 MMHG | WEIGHT: 160.38 LBS | HEART RATE: 70 BPM | SYSTOLIC BLOOD PRESSURE: 153 MMHG | BODY MASS INDEX: 26.72 KG/M2 | TEMPERATURE: 100 F | RESPIRATION RATE: 18 BRPM

## 2022-01-01 VITALS
SYSTOLIC BLOOD PRESSURE: 123 MMHG | SYSTOLIC BLOOD PRESSURE: 138 MMHG | TEMPERATURE: 98 F | OXYGEN SATURATION: 95 % | RESPIRATION RATE: 18 BRPM | HEART RATE: 73 BPM | DIASTOLIC BLOOD PRESSURE: 59 MMHG | HEART RATE: 83 BPM | DIASTOLIC BLOOD PRESSURE: 57 MMHG | RESPIRATION RATE: 18 BRPM | TEMPERATURE: 98 F

## 2022-01-01 VITALS
DIASTOLIC BLOOD PRESSURE: 64 MMHG | TEMPERATURE: 98 F | SYSTOLIC BLOOD PRESSURE: 129 MMHG | HEART RATE: 81 BPM | RESPIRATION RATE: 16 BRPM

## 2022-01-01 VITALS
TEMPERATURE: 99 F | HEART RATE: 74 BPM | RESPIRATION RATE: 18 BRPM | SYSTOLIC BLOOD PRESSURE: 132 MMHG | RESPIRATION RATE: 18 BRPM | SYSTOLIC BLOOD PRESSURE: 118 MMHG | DIASTOLIC BLOOD PRESSURE: 59 MMHG | HEART RATE: 85 BPM | DIASTOLIC BLOOD PRESSURE: 58 MMHG

## 2022-01-01 VITALS
SYSTOLIC BLOOD PRESSURE: 126 MMHG | TEMPERATURE: 98 F | HEART RATE: 75 BPM | DIASTOLIC BLOOD PRESSURE: 61 MMHG | RESPIRATION RATE: 16 BRPM

## 2022-01-01 VITALS
OXYGEN SATURATION: 84 % | DIASTOLIC BLOOD PRESSURE: 39 MMHG | HEIGHT: 65 IN | WEIGHT: 155 LBS | SYSTOLIC BLOOD PRESSURE: 45 MMHG | TEMPERATURE: 94 F | BODY MASS INDEX: 25.83 KG/M2

## 2022-01-01 VITALS
HEART RATE: 75 BPM | OXYGEN SATURATION: 96 % | SYSTOLIC BLOOD PRESSURE: 162 MMHG | RESPIRATION RATE: 16 BRPM | TEMPERATURE: 98 F | DIASTOLIC BLOOD PRESSURE: 66 MMHG

## 2022-01-01 VITALS
HEART RATE: 71 BPM | RESPIRATION RATE: 18 BRPM | SYSTOLIC BLOOD PRESSURE: 113 MMHG | DIASTOLIC BLOOD PRESSURE: 55 MMHG | TEMPERATURE: 98 F

## 2022-01-01 VITALS
HEIGHT: 65 IN | WEIGHT: 152.75 LBS | OXYGEN SATURATION: 93 % | DIASTOLIC BLOOD PRESSURE: 59 MMHG | TEMPERATURE: 98 F | RESPIRATION RATE: 17 BRPM | SYSTOLIC BLOOD PRESSURE: 109 MMHG | HEART RATE: 75 BPM | BODY MASS INDEX: 25.45 KG/M2

## 2022-01-01 VITALS
WEIGHT: 153.69 LBS | RESPIRATION RATE: 17 BRPM | TEMPERATURE: 98 F | SYSTOLIC BLOOD PRESSURE: 112 MMHG | OXYGEN SATURATION: 93 % | HEART RATE: 74 BPM | HEIGHT: 65 IN | BODY MASS INDEX: 25.61 KG/M2 | DIASTOLIC BLOOD PRESSURE: 53 MMHG

## 2022-01-01 VITALS — RESPIRATION RATE: 18 BRPM | HEART RATE: 82 BPM | DIASTOLIC BLOOD PRESSURE: 60 MMHG | SYSTOLIC BLOOD PRESSURE: 112 MMHG

## 2022-01-01 VITALS
TEMPERATURE: 98 F | SYSTOLIC BLOOD PRESSURE: 123 MMHG | HEART RATE: 65 BPM | DIASTOLIC BLOOD PRESSURE: 65 MMHG | RESPIRATION RATE: 17 BRPM

## 2022-01-01 VITALS
TEMPERATURE: 98 F | OXYGEN SATURATION: 95 % | SYSTOLIC BLOOD PRESSURE: 131 MMHG | RESPIRATION RATE: 18 BRPM | DIASTOLIC BLOOD PRESSURE: 61 MMHG | HEART RATE: 77 BPM

## 2022-01-01 VITALS
RESPIRATION RATE: 18 BRPM | DIASTOLIC BLOOD PRESSURE: 66 MMHG | OXYGEN SATURATION: 99 % | SYSTOLIC BLOOD PRESSURE: 147 MMHG | TEMPERATURE: 98 F | HEART RATE: 70 BPM

## 2022-01-01 DIAGNOSIS — R09.81 NASAL CONGESTION WITH RHINORRHEA: ICD-10-CM

## 2022-01-01 DIAGNOSIS — D64.9 ANEMIA REQUIRING TRANSFUSIONS: ICD-10-CM

## 2022-01-01 DIAGNOSIS — F43.23 ADJUSTMENT DISORDER WITH MIXED ANXIETY AND DEPRESSED MOOD: ICD-10-CM

## 2022-01-01 DIAGNOSIS — M75.40 SHOULDER IMPINGEMENT SYNDROME, UNSPECIFIED LATERALITY: ICD-10-CM

## 2022-01-01 DIAGNOSIS — R07.9 CHEST PAIN: ICD-10-CM

## 2022-01-01 DIAGNOSIS — G47.00 INSOMNIA, UNSPECIFIED TYPE: ICD-10-CM

## 2022-01-01 DIAGNOSIS — D46.C MDS (MYELODYSPLASTIC SYNDROME) WITH 5Q DELETION: Primary | ICD-10-CM

## 2022-01-01 DIAGNOSIS — J96.01 ACUTE RESPIRATORY FAILURE WITH HYPOXIA: ICD-10-CM

## 2022-01-01 DIAGNOSIS — Z51.89 ENCOUNTER FOR BLOOD TRANSFUSION: ICD-10-CM

## 2022-01-01 DIAGNOSIS — J18.9 CAP (COMMUNITY ACQUIRED PNEUMONIA): ICD-10-CM

## 2022-01-01 DIAGNOSIS — M25.511 PAIN OF BOTH SHOULDER JOINTS: ICD-10-CM

## 2022-01-01 DIAGNOSIS — D64.9 ANEMIA REQUIRING TRANSFUSIONS: Primary | ICD-10-CM

## 2022-01-01 DIAGNOSIS — A41.9 SEVERE SEPSIS: ICD-10-CM

## 2022-01-01 DIAGNOSIS — T45.1X5A CHEMOTHERAPY-INDUCED NAUSEA: ICD-10-CM

## 2022-01-01 DIAGNOSIS — D64.9 ANEMIA, UNSPECIFIED TYPE: ICD-10-CM

## 2022-01-01 DIAGNOSIS — D46.C MDS (MYELODYSPLASTIC SYNDROME) WITH 5Q DELETION: ICD-10-CM

## 2022-01-01 DIAGNOSIS — M10.071 ACUTE IDIOPATHIC GOUT OF RIGHT FOOT: ICD-10-CM

## 2022-01-01 DIAGNOSIS — D46.9 MYELODYSPLASTIC SYNDROME: ICD-10-CM

## 2022-01-01 DIAGNOSIS — M25.50 POLYARTHRALGIA: ICD-10-CM

## 2022-01-01 DIAGNOSIS — R29.3 POOR POSTURE: ICD-10-CM

## 2022-01-01 DIAGNOSIS — J18.9 COMMUNITY ACQUIRED PNEUMONIA OF RIGHT LUNG, UNSPECIFIED PART OF LUNG: ICD-10-CM

## 2022-01-01 DIAGNOSIS — M54.31 BILATERAL SCIATICA: ICD-10-CM

## 2022-01-01 DIAGNOSIS — D46.9 MDS (MYELODYSPLASTIC SYNDROME): ICD-10-CM

## 2022-01-01 DIAGNOSIS — E83.19 IRON OVERLOAD: ICD-10-CM

## 2022-01-01 DIAGNOSIS — D61.818 PANCYTOPENIA: ICD-10-CM

## 2022-01-01 DIAGNOSIS — F32.A DEPRESSION, UNSPECIFIED DEPRESSION TYPE: ICD-10-CM

## 2022-01-01 DIAGNOSIS — I63.9 STROKE: ICD-10-CM

## 2022-01-01 DIAGNOSIS — R93.89 ABNORMAL CT OF THE CHEST: ICD-10-CM

## 2022-01-01 DIAGNOSIS — R57.9 SHOCK: Primary | ICD-10-CM

## 2022-01-01 DIAGNOSIS — I70.0 ATHEROSCLEROSIS OF AORTA: ICD-10-CM

## 2022-01-01 DIAGNOSIS — Z12.11 COLON CANCER SCREENING: ICD-10-CM

## 2022-01-01 DIAGNOSIS — R53.1 WEAKNESS: ICD-10-CM

## 2022-01-01 DIAGNOSIS — R26.9 GAIT DIFFICULTY: ICD-10-CM

## 2022-01-01 DIAGNOSIS — D46.9 MYELODYSPLASTIC SYNDROME: Primary | ICD-10-CM

## 2022-01-01 DIAGNOSIS — M25.512 PAIN OF BOTH SHOULDER JOINTS: ICD-10-CM

## 2022-01-01 DIAGNOSIS — E11.9 CONTROLLED TYPE 2 DIABETES MELLITUS WITHOUT COMPLICATION, WITHOUT LONG-TERM CURRENT USE OF INSULIN: Primary | ICD-10-CM

## 2022-01-01 DIAGNOSIS — E11.22 CONTROLLED TYPE 2 DIABETES MELLITUS WITH STAGE 2 CHRONIC KIDNEY DISEASE, WITHOUT LONG-TERM CURRENT USE OF INSULIN: Primary | ICD-10-CM

## 2022-01-01 DIAGNOSIS — K21.9 GASTROESOPHAGEAL REFLUX DISEASE WITHOUT ESOPHAGITIS: ICD-10-CM

## 2022-01-01 DIAGNOSIS — Z91.81 AT HIGH RISK FOR FALLS: ICD-10-CM

## 2022-01-01 DIAGNOSIS — I10 ESSENTIAL HYPERTENSION: ICD-10-CM

## 2022-01-01 DIAGNOSIS — I25.10 CORONARY ARTERY DISEASE INVOLVING NATIVE CORONARY ARTERY OF NATIVE HEART WITHOUT ANGINA PECTORIS: ICD-10-CM

## 2022-01-01 DIAGNOSIS — Z12.31 ENCOUNTER FOR SCREENING MAMMOGRAM FOR BREAST CANCER: ICD-10-CM

## 2022-01-01 DIAGNOSIS — N17.9 AKI (ACUTE KIDNEY INJURY): ICD-10-CM

## 2022-01-01 DIAGNOSIS — A41.9 SEPSIS, DUE TO UNSPECIFIED ORGANISM, UNSPECIFIED WHETHER ACUTE ORGAN DYSFUNCTION PRESENT: Primary | ICD-10-CM

## 2022-01-01 DIAGNOSIS — E11.9 TYPE 2 DIABETES MELLITUS WITHOUT COMPLICATION, UNSPECIFIED WHETHER LONG TERM INSULIN USE: ICD-10-CM

## 2022-01-01 DIAGNOSIS — R29.898 WEAKNESS OF BOTH UPPER EXTREMITIES: ICD-10-CM

## 2022-01-01 DIAGNOSIS — Z87.01 HISTORY OF PNEUMONIA: ICD-10-CM

## 2022-01-01 DIAGNOSIS — D64.9 ANEMIA: ICD-10-CM

## 2022-01-01 DIAGNOSIS — M54.32 BILATERAL SCIATICA: ICD-10-CM

## 2022-01-01 DIAGNOSIS — R09.02 HYPOXIA: ICD-10-CM

## 2022-01-01 DIAGNOSIS — R06.02 SHORTNESS OF BREATH: ICD-10-CM

## 2022-01-01 DIAGNOSIS — I63.9 CVA (CEREBRAL VASCULAR ACCIDENT): ICD-10-CM

## 2022-01-01 DIAGNOSIS — R79.89 ELEVATED LACTIC ACID LEVEL: ICD-10-CM

## 2022-01-01 DIAGNOSIS — D53.9 NUTRITIONAL ANEMIA, UNSPECIFIED: ICD-10-CM

## 2022-01-01 DIAGNOSIS — M25.619 DECREASED RANGE OF MOTION OF SHOULDER, UNSPECIFIED LATERALITY: ICD-10-CM

## 2022-01-01 DIAGNOSIS — Z91.81 HISTORY OF FALL: ICD-10-CM

## 2022-01-01 DIAGNOSIS — D46.9 MDS (MYELODYSPLASTIC SYNDROME): Primary | ICD-10-CM

## 2022-01-01 DIAGNOSIS — M10.271 ACUTE DRUG-INDUCED GOUT OF RIGHT FOOT: ICD-10-CM

## 2022-01-01 DIAGNOSIS — I63.9 ISCHEMIC CEREBROVASCULAR ACCIDENT (CVA): ICD-10-CM

## 2022-01-01 DIAGNOSIS — E87.6 HYPOKALEMIA: ICD-10-CM

## 2022-01-01 DIAGNOSIS — G81.94 LEFT HEMIPARESIS: ICD-10-CM

## 2022-01-01 DIAGNOSIS — R53.81 PHYSICAL DEBILITY: ICD-10-CM

## 2022-01-01 DIAGNOSIS — E11.9 CONTROLLED TYPE 2 DIABETES MELLITUS WITHOUT COMPLICATION, WITHOUT LONG-TERM CURRENT USE OF INSULIN: ICD-10-CM

## 2022-01-01 DIAGNOSIS — J43.2 CENTRILOBULAR EMPHYSEMA: ICD-10-CM

## 2022-01-01 DIAGNOSIS — D72.819 CHRONIC LEUKOPENIA: ICD-10-CM

## 2022-01-01 DIAGNOSIS — D64.9 CHRONIC ANEMIA: Primary | ICD-10-CM

## 2022-01-01 DIAGNOSIS — E16.2 MULTIPLE EPISODES OF HYPOGLYCEMIA: ICD-10-CM

## 2022-01-01 DIAGNOSIS — M10.279 ACUTE DRUG-INDUCED GOUT OF FOOT, UNSPECIFIED LATERALITY: ICD-10-CM

## 2022-01-01 DIAGNOSIS — R05.9 COUGH: ICD-10-CM

## 2022-01-01 DIAGNOSIS — Z79.2 PROPHYLACTIC ANTIBIOTIC: ICD-10-CM

## 2022-01-01 DIAGNOSIS — D64.9 SYMPTOMATIC ANEMIA: ICD-10-CM

## 2022-01-01 DIAGNOSIS — R47.01 APHASIA: ICD-10-CM

## 2022-01-01 DIAGNOSIS — J18.9 PNEUMONIA OF RIGHT MIDDLE LOBE DUE TO INFECTIOUS ORGANISM: Primary | ICD-10-CM

## 2022-01-01 DIAGNOSIS — M10.9: ICD-10-CM

## 2022-01-01 DIAGNOSIS — M10.9 ACUTE GOUT OF FOOT, UNSPECIFIED CAUSE, UNSPECIFIED LATERALITY: ICD-10-CM

## 2022-01-01 DIAGNOSIS — R06.00 DYSPNEA: ICD-10-CM

## 2022-01-01 DIAGNOSIS — R41.89 UNRESPONSIVENESS: ICD-10-CM

## 2022-01-01 DIAGNOSIS — M25.50 POLYARTHRALGIA: Primary | ICD-10-CM

## 2022-01-01 DIAGNOSIS — R11.0 CHEMOTHERAPY-INDUCED NAUSEA: ICD-10-CM

## 2022-01-01 DIAGNOSIS — I95.9 HYPOTENSION: ICD-10-CM

## 2022-01-01 DIAGNOSIS — E87.8 ELECTROLYTE ABNORMALITY: ICD-10-CM

## 2022-01-01 DIAGNOSIS — R91.1 LESION OF RIGHT LUNG: ICD-10-CM

## 2022-01-01 DIAGNOSIS — J34.89 NASAL CONGESTION WITH RHINORRHEA: ICD-10-CM

## 2022-01-01 DIAGNOSIS — Z11.52 ENCOUNTER FOR SCREENING FOR COVID-19: ICD-10-CM

## 2022-01-01 DIAGNOSIS — E86.0 DEHYDRATION: Primary | ICD-10-CM

## 2022-01-01 DIAGNOSIS — R65.20 SEVERE SEPSIS: ICD-10-CM

## 2022-01-01 DIAGNOSIS — R94.31 ELECTROCARDIOGRAM ABNORMAL: ICD-10-CM

## 2022-01-01 DIAGNOSIS — N18.2 CONTROLLED TYPE 2 DIABETES MELLITUS WITH STAGE 2 CHRONIC KIDNEY DISEASE, WITHOUT LONG-TERM CURRENT USE OF INSULIN: Primary | ICD-10-CM

## 2022-01-01 LAB
ABO + RH BLD: NORMAL
ACID FAST MOD KINY STN SPEC: NORMAL
ALBUMIN SERPL BCP-MCNC: 1.8 G/DL (ref 3.5–5.2)
ALBUMIN SERPL BCP-MCNC: 1.9 G/DL (ref 3.5–5.2)
ALBUMIN SERPL BCP-MCNC: 2 G/DL (ref 3.5–5.2)
ALBUMIN SERPL BCP-MCNC: 2.1 G/DL (ref 3.5–5.2)
ALBUMIN SERPL BCP-MCNC: 2.3 G/DL (ref 3.5–5.2)
ALBUMIN SERPL BCP-MCNC: 2.3 G/DL (ref 3.5–5.2)
ALBUMIN SERPL BCP-MCNC: 2.4 G/DL (ref 3.5–5.2)
ALBUMIN SERPL BCP-MCNC: 2.4 G/DL (ref 3.5–5.2)
ALBUMIN SERPL BCP-MCNC: 2.5 G/DL (ref 3.5–5.2)
ALBUMIN SERPL BCP-MCNC: 2.7 G/DL (ref 3.5–5.2)
ALBUMIN SERPL BCP-MCNC: 2.9 G/DL (ref 3.5–5.2)
ALBUMIN SERPL BCP-MCNC: 3 G/DL (ref 3.5–5.2)
ALBUMIN SERPL BCP-MCNC: 3 G/DL (ref 3.5–5.2)
ALBUMIN SERPL BCP-MCNC: 3.1 G/DL (ref 3.5–5.2)
ALBUMIN SERPL BCP-MCNC: 3.2 G/DL (ref 3.5–5.2)
ALBUMIN SERPL BCP-MCNC: 3.2 G/DL (ref 3.5–5.2)
ALBUMIN SERPL BCP-MCNC: 3.4 G/DL (ref 3.5–5.2)
ALBUMIN SERPL BCP-MCNC: 3.5 G/DL (ref 3.5–5.2)
ALBUMIN SERPL BCP-MCNC: 3.6 G/DL (ref 3.5–5.2)
ALBUMIN SERPL BCP-MCNC: 3.7 G/DL (ref 3.5–5.2)
ALBUMIN SERPL BCP-MCNC: 3.7 G/DL (ref 3.5–5.2)
ALBUMIN/CREAT UR: 40.6 UG/MG (ref 0–30)
ALLENS TEST: ABNORMAL
ALP SERPL-CCNC: 44 U/L (ref 55–135)
ALP SERPL-CCNC: 47 U/L (ref 55–135)
ALP SERPL-CCNC: 48 U/L (ref 55–135)
ALP SERPL-CCNC: 49 U/L (ref 55–135)
ALP SERPL-CCNC: 50 U/L (ref 55–135)
ALP SERPL-CCNC: 50 U/L (ref 55–135)
ALP SERPL-CCNC: 54 U/L (ref 55–135)
ALP SERPL-CCNC: 54 U/L (ref 55–135)
ALP SERPL-CCNC: 56 U/L (ref 55–135)
ALP SERPL-CCNC: 56 U/L (ref 55–135)
ALP SERPL-CCNC: 57 U/L (ref 55–135)
ALP SERPL-CCNC: 58 U/L (ref 55–135)
ALP SERPL-CCNC: 58 U/L (ref 55–135)
ALP SERPL-CCNC: 61 U/L (ref 55–135)
ALP SERPL-CCNC: 61 U/L (ref 55–135)
ALP SERPL-CCNC: 62 U/L (ref 55–135)
ALP SERPL-CCNC: 63 U/L (ref 55–135)
ALP SERPL-CCNC: 64 U/L (ref 55–135)
ALP SERPL-CCNC: 64 U/L (ref 55–135)
ALP SERPL-CCNC: 65 U/L (ref 55–135)
ALP SERPL-CCNC: 66 U/L (ref 55–135)
ALP SERPL-CCNC: 66 U/L (ref 55–135)
ALP SERPL-CCNC: 67 U/L (ref 55–135)
ALP SERPL-CCNC: 70 U/L (ref 55–135)
ALP SERPL-CCNC: 71 U/L (ref 55–135)
ALP SERPL-CCNC: 76 U/L (ref 55–135)
ALP SERPL-CCNC: 78 U/L (ref 55–135)
ALP SERPL-CCNC: 82 U/L (ref 55–135)
ALP SERPL-CCNC: 85 U/L (ref 55–135)
ALP SERPL-CCNC: 86 U/L (ref 55–135)
ALP SERPL-CCNC: 87 U/L (ref 55–135)
ALP SERPL-CCNC: 87 U/L (ref 55–135)
ALP SERPL-CCNC: 91 U/L (ref 55–135)
ALP SERPL-CCNC: 93 U/L (ref 55–135)
ALP SERPL-CCNC: 94 U/L (ref 55–135)
ALP SERPL-CCNC: 95 U/L (ref 55–135)
ALP SERPL-CCNC: 96 U/L (ref 55–135)
ALT SERPL W/O P-5'-P-CCNC: 101 U/L (ref 10–44)
ALT SERPL W/O P-5'-P-CCNC: 102 U/L (ref 10–44)
ALT SERPL W/O P-5'-P-CCNC: 112 U/L (ref 10–44)
ALT SERPL W/O P-5'-P-CCNC: 27 U/L (ref 10–44)
ALT SERPL W/O P-5'-P-CCNC: 28 U/L (ref 10–44)
ALT SERPL W/O P-5'-P-CCNC: 30 U/L (ref 10–44)
ALT SERPL W/O P-5'-P-CCNC: 37 U/L (ref 10–44)
ALT SERPL W/O P-5'-P-CCNC: 37 U/L (ref 10–44)
ALT SERPL W/O P-5'-P-CCNC: 39 U/L (ref 10–44)
ALT SERPL W/O P-5'-P-CCNC: 39 U/L (ref 10–44)
ALT SERPL W/O P-5'-P-CCNC: 40 U/L (ref 10–44)
ALT SERPL W/O P-5'-P-CCNC: 42 U/L (ref 10–44)
ALT SERPL W/O P-5'-P-CCNC: 44 U/L (ref 10–44)
ALT SERPL W/O P-5'-P-CCNC: 45 U/L (ref 10–44)
ALT SERPL W/O P-5'-P-CCNC: 49 U/L (ref 10–44)
ALT SERPL W/O P-5'-P-CCNC: 49 U/L (ref 10–44)
ALT SERPL W/O P-5'-P-CCNC: 51 U/L (ref 10–44)
ALT SERPL W/O P-5'-P-CCNC: 52 U/L (ref 10–44)
ALT SERPL W/O P-5'-P-CCNC: 55 U/L (ref 10–44)
ALT SERPL W/O P-5'-P-CCNC: 57 U/L (ref 10–44)
ALT SERPL W/O P-5'-P-CCNC: 60 U/L (ref 10–44)
ALT SERPL W/O P-5'-P-CCNC: 61 U/L (ref 10–44)
ALT SERPL W/O P-5'-P-CCNC: 62 U/L (ref 10–44)
ALT SERPL W/O P-5'-P-CCNC: 62 U/L (ref 10–44)
ALT SERPL W/O P-5'-P-CCNC: 65 U/L (ref 10–44)
ALT SERPL W/O P-5'-P-CCNC: 66 U/L (ref 10–44)
ALT SERPL W/O P-5'-P-CCNC: 66 U/L (ref 10–44)
ALT SERPL W/O P-5'-P-CCNC: 72 U/L (ref 10–44)
ALT SERPL W/O P-5'-P-CCNC: 73 U/L (ref 10–44)
ALT SERPL W/O P-5'-P-CCNC: 74 U/L (ref 10–44)
ALT SERPL W/O P-5'-P-CCNC: 77 U/L (ref 10–44)
ALT SERPL W/O P-5'-P-CCNC: 78 U/L (ref 10–44)
ALT SERPL W/O P-5'-P-CCNC: 79 U/L (ref 10–44)
ALT SERPL W/O P-5'-P-CCNC: 81 U/L (ref 10–44)
ALT SERPL W/O P-5'-P-CCNC: 82 U/L (ref 10–44)
ALT SERPL W/O P-5'-P-CCNC: 85 U/L (ref 10–44)
ALT SERPL W/O P-5'-P-CCNC: 87 U/L (ref 10–44)
ALT SERPL W/O P-5'-P-CCNC: 87 U/L (ref 10–44)
ALT SERPL W/O P-5'-P-CCNC: 93 U/L (ref 10–44)
ALT SERPL W/O P-5'-P-CCNC: 94 U/L (ref 10–44)
ANION GAP SERPL CALC-SCNC: 10 MMOL/L (ref 8–16)
ANION GAP SERPL CALC-SCNC: 11 MMOL/L (ref 8–16)
ANION GAP SERPL CALC-SCNC: 16 MMOL/L (ref 8–16)
ANION GAP SERPL CALC-SCNC: 6 MMOL/L (ref 8–16)
ANION GAP SERPL CALC-SCNC: 7 MMOL/L (ref 8–16)
ANION GAP SERPL CALC-SCNC: 8 MMOL/L (ref 8–16)
ANION GAP SERPL CALC-SCNC: 9 MMOL/L (ref 8–16)
ANISOCYTOSIS BLD QL SMEAR: ABNORMAL
ANISOCYTOSIS BLD QL SMEAR: SLIGHT
APPEARANCE FLD: NORMAL
ASCENDING AORTA: 2.98 CM
ASCENDING AORTA: 2.99 CM
AST SERPL-CCNC: 101 U/L (ref 10–40)
AST SERPL-CCNC: 17 U/L (ref 10–40)
AST SERPL-CCNC: 17 U/L (ref 10–40)
AST SERPL-CCNC: 18 U/L (ref 10–40)
AST SERPL-CCNC: 18 U/L (ref 10–40)
AST SERPL-CCNC: 19 U/L (ref 10–40)
AST SERPL-CCNC: 21 U/L (ref 10–40)
AST SERPL-CCNC: 22 U/L (ref 10–40)
AST SERPL-CCNC: 22 U/L (ref 10–40)
AST SERPL-CCNC: 23 U/L (ref 10–40)
AST SERPL-CCNC: 23 U/L (ref 10–40)
AST SERPL-CCNC: 24 U/L (ref 10–40)
AST SERPL-CCNC: 26 U/L (ref 10–40)
AST SERPL-CCNC: 26 U/L (ref 10–40)
AST SERPL-CCNC: 32 U/L (ref 10–40)
AST SERPL-CCNC: 35 U/L (ref 10–40)
AST SERPL-CCNC: 35 U/L (ref 10–40)
AST SERPL-CCNC: 38 U/L (ref 10–40)
AST SERPL-CCNC: 39 U/L (ref 10–40)
AST SERPL-CCNC: 39 U/L (ref 10–40)
AST SERPL-CCNC: 40 U/L (ref 10–40)
AST SERPL-CCNC: 40 U/L (ref 10–40)
AST SERPL-CCNC: 44 U/L (ref 10–40)
AST SERPL-CCNC: 45 U/L (ref 10–40)
AST SERPL-CCNC: 46 U/L (ref 10–40)
AST SERPL-CCNC: 46 U/L (ref 10–40)
AST SERPL-CCNC: 47 U/L (ref 10–40)
AST SERPL-CCNC: 49 U/L (ref 10–40)
AST SERPL-CCNC: 50 U/L (ref 10–40)
AST SERPL-CCNC: 50 U/L (ref 10–40)
AST SERPL-CCNC: 51 U/L (ref 10–40)
AST SERPL-CCNC: 52 U/L (ref 10–40)
AST SERPL-CCNC: 55 U/L (ref 10–40)
AST SERPL-CCNC: 59 U/L (ref 10–40)
AST SERPL-CCNC: 59 U/L (ref 10–40)
AST SERPL-CCNC: 68 U/L (ref 10–40)
AST SERPL-CCNC: 68 U/L (ref 10–40)
AV INDEX (PROSTH): 0.73
AV INDEX (PROSTH): 0.75
AV MEAN GRADIENT: 2 MMHG
AV MEAN GRADIENT: 5 MMHG
AV PEAK GRADIENT: 10 MMHG
AV PEAK GRADIENT: 4 MMHG
AV VALVE AREA: 2.23 CM2
AV VALVE AREA: 2.84 CM2
AV VELOCITY RATIO: 0.74
AV VELOCITY RATIO: 0.78
BACTERIA #/AREA URNS AUTO: ABNORMAL /HPF
BACTERIA #/AREA URNS AUTO: NORMAL /HPF
BACTERIA BLD CULT: NORMAL
BACTERIA SPEC AEROBE CULT: NORMAL
BACTERIA SPEC AEROBE CULT: NORMAL
BASO STIPL BLD QL SMEAR: ABNORMAL
BASOPHILS # BLD AUTO: 0.03 K/UL (ref 0–0.2)
BASOPHILS # BLD AUTO: 0.04 K/UL (ref 0–0.2)
BASOPHILS # BLD AUTO: 0.04 K/UL (ref 0–0.2)
BASOPHILS # BLD AUTO: 0.05 K/UL (ref 0–0.2)
BASOPHILS # BLD AUTO: 0.05 K/UL (ref 0–0.2)
BASOPHILS # BLD AUTO: 0.06 K/UL (ref 0–0.2)
BASOPHILS # BLD AUTO: 0.07 K/UL (ref 0–0.2)
BASOPHILS # BLD AUTO: ABNORMAL K/UL (ref 0–0.2)
BASOPHILS NFR BLD: 0 % (ref 0–1.9)
BASOPHILS NFR BLD: 0.7 % (ref 0–1.9)
BASOPHILS NFR BLD: 0.9 % (ref 0–1.9)
BASOPHILS NFR BLD: 1 % (ref 0–1.9)
BASOPHILS NFR BLD: 1.2 % (ref 0–1.9)
BASOPHILS NFR BLD: 1.5 % (ref 0–1.9)
BASOPHILS NFR BLD: 1.6 % (ref 0–1.9)
BASOPHILS NFR BLD: 1.9 % (ref 0–1.9)
BASOPHILS NFR BLD: 2 % (ref 0–1.9)
BASOPHILS NFR BLD: 2.2 % (ref 0–1.9)
BASOPHILS NFR BLD: 2.2 % (ref 0–1.9)
BASOPHILS NFR BLD: 2.8 % (ref 0–1.9)
BASOPHILS NFR BLD: 3 % (ref 0–1.9)
BASOPHILS NFR BLD: 3 % (ref 0–1.9)
BASOPHILS NFR BLD: 3.4 % (ref 0–1.9)
BASOPHILS NFR BLD: 5 % (ref 0–1.9)
BILIRUB SERPL-MCNC: 0.2 MG/DL (ref 0.1–1)
BILIRUB SERPL-MCNC: 0.3 MG/DL (ref 0.1–1)
BILIRUB SERPL-MCNC: 0.4 MG/DL (ref 0.1–1)
BILIRUB SERPL-MCNC: 0.5 MG/DL (ref 0.1–1)
BILIRUB SERPL-MCNC: 0.6 MG/DL (ref 0.1–1)
BILIRUB SERPL-MCNC: 0.7 MG/DL (ref 0.1–1)
BILIRUB UR QL STRIP: NEGATIVE
BLASTS NFR BLD MANUAL: 1 %
BLASTS NFR BLD MANUAL: 2 %
BLASTS NFR BLD MANUAL: 3 %
BLASTS NFR BLD MANUAL: 5 %
BLD GP AB SCN CELLS X3 SERPL QL: NORMAL
BLD PROD TYP BPU: NORMAL
BLOOD UNIT EXPIRATION DATE: NORMAL
BLOOD UNIT TYPE CODE: 5100
BLOOD UNIT TYPE CODE: 6200
BLOOD UNIT TYPE: NORMAL
BNP SERPL-MCNC: 65 PG/ML (ref 0–99)
BODY FLD TYPE: NORMAL
BSA FOR ECHO PROCEDURE: 1.8 M2
BSA FOR ECHO PROCEDURE: 1.84 M2
BUN SERPL-MCNC: 11 MG/DL (ref 8–23)
BUN SERPL-MCNC: 11 MG/DL (ref 8–23)
BUN SERPL-MCNC: 12 MG/DL (ref 8–23)
BUN SERPL-MCNC: 12 MG/DL (ref 8–23)
BUN SERPL-MCNC: 13 MG/DL (ref 8–23)
BUN SERPL-MCNC: 13 MG/DL (ref 8–23)
BUN SERPL-MCNC: 14 MG/DL (ref 8–23)
BUN SERPL-MCNC: 15 MG/DL (ref 8–23)
BUN SERPL-MCNC: 16 MG/DL (ref 8–23)
BUN SERPL-MCNC: 17 MG/DL (ref 8–23)
BUN SERPL-MCNC: 18 MG/DL (ref 8–23)
BUN SERPL-MCNC: 19 MG/DL (ref 8–23)
BUN SERPL-MCNC: 19 MG/DL (ref 8–23)
BUN SERPL-MCNC: 20 MG/DL (ref 8–23)
BUN SERPL-MCNC: 22 MG/DL (ref 8–23)
BUN SERPL-MCNC: 22 MG/DL (ref 8–23)
BUN SERPL-MCNC: 24 MG/DL (ref 8–23)
BUN SERPL-MCNC: 25 MG/DL (ref 8–23)
BUN SERPL-MCNC: 25 MG/DL (ref 8–23)
BUN SERPL-MCNC: 26 MG/DL (ref 8–23)
BUN SERPL-MCNC: 26 MG/DL (ref 8–23)
BUN SERPL-MCNC: 27 MG/DL (ref 8–23)
BUN SERPL-MCNC: 29 MG/DL (ref 8–23)
BUN SERPL-MCNC: 29 MG/DL (ref 8–23)
BUN SERPL-MCNC: 31 MG/DL (ref 8–23)
BUN SERPL-MCNC: 31 MG/DL (ref 8–23)
BUN SERPL-MCNC: 32 MG/DL (ref 6–30)
BUN SERPL-MCNC: 32 MG/DL (ref 8–23)
BUN SERPL-MCNC: 34 MG/DL (ref 8–23)
BUN SERPL-MCNC: 35 MG/DL (ref 8–23)
BUN SERPL-MCNC: 39 MG/DL (ref 8–23)
BUN SERPL-MCNC: 41 MG/DL (ref 8–23)
BUN SERPL-MCNC: 42 MG/DL (ref 8–23)
BUN SERPL-MCNC: 43 MG/DL (ref 8–23)
BUN SERPL-MCNC: 57 MG/DL (ref 8–23)
BUN SERPL-MCNC: 58 MG/DL (ref 8–23)
BUN SERPL-MCNC: 61 MG/DL (ref 6–30)
BUN SERPL-MCNC: 63 MG/DL (ref 8–23)
BUN SERPL-MCNC: 68 MG/DL (ref 8–23)
CALCIUM SERPL-MCNC: 10 MG/DL (ref 8.7–10.5)
CALCIUM SERPL-MCNC: 10 MG/DL (ref 8.7–10.5)
CALCIUM SERPL-MCNC: 10.5 MG/DL (ref 8.7–10.5)
CALCIUM SERPL-MCNC: 8.1 MG/DL (ref 8.7–10.5)
CALCIUM SERPL-MCNC: 8.2 MG/DL (ref 8.7–10.5)
CALCIUM SERPL-MCNC: 8.2 MG/DL (ref 8.7–10.5)
CALCIUM SERPL-MCNC: 8.3 MG/DL (ref 8.7–10.5)
CALCIUM SERPL-MCNC: 8.3 MG/DL (ref 8.7–10.5)
CALCIUM SERPL-MCNC: 8.4 MG/DL (ref 8.7–10.5)
CALCIUM SERPL-MCNC: 8.5 MG/DL (ref 8.7–10.5)
CALCIUM SERPL-MCNC: 8.6 MG/DL (ref 8.7–10.5)
CALCIUM SERPL-MCNC: 8.7 MG/DL (ref 8.7–10.5)
CALCIUM SERPL-MCNC: 8.8 MG/DL (ref 8.7–10.5)
CALCIUM SERPL-MCNC: 8.9 MG/DL (ref 8.7–10.5)
CALCIUM SERPL-MCNC: 8.9 MG/DL (ref 8.7–10.5)
CALCIUM SERPL-MCNC: 9 MG/DL (ref 8.7–10.5)
CALCIUM SERPL-MCNC: 9.1 MG/DL (ref 8.7–10.5)
CALCIUM SERPL-MCNC: 9.2 MG/DL (ref 8.7–10.5)
CALCIUM SERPL-MCNC: 9.2 MG/DL (ref 8.7–10.5)
CALCIUM SERPL-MCNC: 9.3 MG/DL (ref 8.7–10.5)
CALCIUM SERPL-MCNC: 9.5 MG/DL (ref 8.7–10.5)
CALCIUM SERPL-MCNC: 9.6 MG/DL (ref 8.7–10.5)
CALCIUM SERPL-MCNC: 9.7 MG/DL (ref 8.7–10.5)
CHLORIDE SERPL-SCNC: 100 MMOL/L (ref 95–110)
CHLORIDE SERPL-SCNC: 101 MMOL/L (ref 95–110)
CHLORIDE SERPL-SCNC: 102 MMOL/L (ref 95–110)
CHLORIDE SERPL-SCNC: 103 MMOL/L (ref 95–110)
CHLORIDE SERPL-SCNC: 103 MMOL/L (ref 95–110)
CHLORIDE SERPL-SCNC: 104 MMOL/L (ref 95–110)
CHLORIDE SERPL-SCNC: 104 MMOL/L (ref 95–110)
CHLORIDE SERPL-SCNC: 105 MMOL/L (ref 95–110)
CHLORIDE SERPL-SCNC: 106 MMOL/L (ref 95–110)
CHLORIDE SERPL-SCNC: 107 MMOL/L (ref 95–110)
CHLORIDE SERPL-SCNC: 88 MMOL/L (ref 95–110)
CHLORIDE SERPL-SCNC: 89 MMOL/L (ref 95–110)
CHLORIDE SERPL-SCNC: 90 MMOL/L (ref 95–110)
CHLORIDE SERPL-SCNC: 91 MMOL/L (ref 95–110)
CHLORIDE SERPL-SCNC: 91 MMOL/L (ref 95–110)
CHLORIDE SERPL-SCNC: 92 MMOL/L (ref 95–110)
CHLORIDE SERPL-SCNC: 92 MMOL/L (ref 95–110)
CHLORIDE SERPL-SCNC: 93 MMOL/L (ref 95–110)
CHLORIDE SERPL-SCNC: 93 MMOL/L (ref 95–110)
CHLORIDE SERPL-SCNC: 94 MMOL/L (ref 95–110)
CHLORIDE SERPL-SCNC: 94 MMOL/L (ref 95–110)
CHLORIDE SERPL-SCNC: 95 MMOL/L (ref 95–110)
CHLORIDE SERPL-SCNC: 96 MMOL/L (ref 95–110)
CHLORIDE SERPL-SCNC: 97 MMOL/L (ref 95–110)
CHLORIDE SERPL-SCNC: 98 MMOL/L (ref 95–110)
CHLORIDE SERPL-SCNC: 98 MMOL/L (ref 95–110)
CHLORIDE SERPL-SCNC: 99 MMOL/L (ref 95–110)
CHOLEST SERPL-MCNC: 125 MG/DL (ref 120–199)
CHOLEST/HDLC SERPL: 5 {RATIO} (ref 2–5)
CLARITY UR REFRACT.AUTO: ABNORMAL
CLARITY UR REFRACT.AUTO: CLEAR
CLARITY UR REFRACT.AUTO: CLEAR
CO2 SERPL-SCNC: 17 MMOL/L (ref 23–29)
CO2 SERPL-SCNC: 24 MMOL/L (ref 23–29)
CO2 SERPL-SCNC: 25 MMOL/L (ref 23–29)
CO2 SERPL-SCNC: 25 MMOL/L (ref 23–29)
CO2 SERPL-SCNC: 26 MMOL/L (ref 23–29)
CO2 SERPL-SCNC: 27 MMOL/L (ref 23–29)
CO2 SERPL-SCNC: 28 MMOL/L (ref 23–29)
CO2 SERPL-SCNC: 29 MMOL/L (ref 23–29)
CO2 SERPL-SCNC: 30 MMOL/L (ref 23–29)
CO2 SERPL-SCNC: 31 MMOL/L (ref 23–29)
CO2 SERPL-SCNC: 32 MMOL/L (ref 23–29)
CO2 SERPL-SCNC: 33 MMOL/L (ref 23–29)
CO2 SERPL-SCNC: 34 MMOL/L (ref 23–29)
CO2 SERPL-SCNC: 35 MMOL/L (ref 23–29)
CODING SYSTEM: NORMAL
COLOR FLD: NORMAL
COLOR UR AUTO: YELLOW
CREAT SERPL-MCNC: 0.6 MG/DL (ref 0.5–1.4)
CREAT SERPL-MCNC: 0.7 MG/DL (ref 0.5–1.4)
CREAT SERPL-MCNC: 0.8 MG/DL (ref 0.5–1.4)
CREAT SERPL-MCNC: 0.9 MG/DL (ref 0.5–1.4)
CREAT SERPL-MCNC: 1 MG/DL (ref 0.5–1.4)
CREAT SERPL-MCNC: 1.1 MG/DL (ref 0.5–1.4)
CREAT SERPL-MCNC: 1.2 MG/DL (ref 0.5–1.4)
CREAT SERPL-MCNC: 1.2 MG/DL (ref 0.5–1.4)
CREAT SERPL-MCNC: 1.3 MG/DL (ref 0.5–1.4)
CREAT SERPL-MCNC: 1.5 MG/DL (ref 0.5–1.4)
CREAT SERPL-MCNC: 1.7 MG/DL (ref 0.5–1.4)
CREAT SERPL-MCNC: 1.7 MG/DL (ref 0.5–1.4)
CREAT UR-MCNC: 64 MG/DL (ref 15–325)
CRP SERPL-MCNC: 209.4 MG/L (ref 0–8.2)
CTP QC/QA: YES
CV ECHO LV RWT: 0.38 CM
CV ECHO LV RWT: 0.56 CM
D DIMER PPP IA.FEU-MCNC: 3.91 MG/L FEU
DACRYOCYTES BLD QL SMEAR: ABNORMAL
DELSYS: ABNORMAL
DIFFERENTIAL METHOD: ABNORMAL
DISPENSE STATUS: NORMAL
DOHLE BOD BLD QL SMEAR: PRESENT
DOP CALC AO PEAK VEL: 1.06 M/S
DOP CALC AO PEAK VEL: 1.61 M/S
DOP CALC AO VTI: 28.66 CM
DOP CALC AO VTI: 33.32 CM
DOP CALC LVOT AREA: 3 CM2
DOP CALC LVOT AREA: 3.9 CM2
DOP CALC LVOT DIAMETER: 1.94 CM
DOP CALC LVOT DIAMETER: 2.22 CM
DOP CALC LVOT PEAK VEL: 0.78 M/S
DOP CALC LVOT PEAK VEL: 1.25 M/S
DOP CALC LVOT STROKE VOLUME: 63.87 CM3
DOP CALC LVOT STROKE VOLUME: 94.55 CM3
DOP CALCLVOT PEAK VEL VTI: 21.62 CM
DOP CALCLVOT PEAK VEL VTI: 24.44 CM
E WAVE DECELERATION TIME: 193.71 MSEC
E/A RATIO: 0.88
E/A RATIO: 4.16
E/E' RATIO: 11.6 M/S
E/E' RATIO: 9.88 M/S
ECHO LV POSTERIOR WALL: 0.94 CM (ref 0.6–1.1)
ECHO LV POSTERIOR WALL: 1.15 CM (ref 0.6–1.1)
EJECTION FRACTION: 63 %
EJECTION FRACTION: 65 %
EOSINOPHIL # BLD AUTO: 0.1 K/UL (ref 0–0.5)
EOSINOPHIL # BLD AUTO: 0.1 K/UL (ref 0–0.5)
EOSINOPHIL # BLD AUTO: 0.2 K/UL (ref 0–0.5)
EOSINOPHIL # BLD AUTO: ABNORMAL K/UL (ref 0–0.5)
EOSINOPHIL NFR BLD: 0 % (ref 0–8)
EOSINOPHIL NFR BLD: 1 % (ref 0–8)
EOSINOPHIL NFR BLD: 11 % (ref 0–8)
EOSINOPHIL NFR BLD: 2 % (ref 0–8)
EOSINOPHIL NFR BLD: 3 % (ref 0–8)
EOSINOPHIL NFR BLD: 3.5 % (ref 0–8)
EOSINOPHIL NFR BLD: 4 % (ref 0–8)
EOSINOPHIL NFR BLD: 4.2 % (ref 0–8)
EOSINOPHIL NFR BLD: 6 % (ref 0–8)
EOSINOPHIL NFR BLD: 6.6 % (ref 0–8)
EOSINOPHIL NFR BLD: 7 % (ref 0–8)
EOSINOPHIL NFR BLD: 7 % (ref 0–8)
EOSINOPHIL NFR BLD: 7.3 % (ref 0–8)
EOSINOPHIL NFR BLD: 7.3 % (ref 0–8)
EOSINOPHIL NFR BLD: 7.7 % (ref 0–8)
EOSINOPHIL NFR BLD: 7.8 % (ref 0–8)
EOSINOPHIL NFR BLD: 8 % (ref 0–8)
EOSINOPHIL NFR BLD: 8 % (ref 0–8)
EOSINOPHIL NFR BLD: 8.1 % (ref 0–8)
EOSINOPHIL NFR BLD: 9.7 % (ref 0–8)
EOSINOPHIL NFR FLD MANUAL: 2 %
ERYTHROCYTE [DISTWIDTH] IN BLOOD BY AUTOMATED COUNT: 14.5 % (ref 11.5–14.5)
ERYTHROCYTE [DISTWIDTH] IN BLOOD BY AUTOMATED COUNT: 14.6 % (ref 11.5–14.5)
ERYTHROCYTE [DISTWIDTH] IN BLOOD BY AUTOMATED COUNT: 14.6 % (ref 11.5–14.5)
ERYTHROCYTE [DISTWIDTH] IN BLOOD BY AUTOMATED COUNT: 14.7 % (ref 11.5–14.5)
ERYTHROCYTE [DISTWIDTH] IN BLOOD BY AUTOMATED COUNT: 14.8 % (ref 11.5–14.5)
ERYTHROCYTE [DISTWIDTH] IN BLOOD BY AUTOMATED COUNT: 14.8 % (ref 11.5–14.5)
ERYTHROCYTE [DISTWIDTH] IN BLOOD BY AUTOMATED COUNT: 15.1 % (ref 11.5–14.5)
ERYTHROCYTE [DISTWIDTH] IN BLOOD BY AUTOMATED COUNT: 15.2 % (ref 11.5–14.5)
ERYTHROCYTE [DISTWIDTH] IN BLOOD BY AUTOMATED COUNT: 15.3 % (ref 11.5–14.5)
ERYTHROCYTE [DISTWIDTH] IN BLOOD BY AUTOMATED COUNT: 15.4 % (ref 11.5–14.5)
ERYTHROCYTE [DISTWIDTH] IN BLOOD BY AUTOMATED COUNT: 15.5 % (ref 11.5–14.5)
ERYTHROCYTE [DISTWIDTH] IN BLOOD BY AUTOMATED COUNT: 15.6 % (ref 11.5–14.5)
ERYTHROCYTE [DISTWIDTH] IN BLOOD BY AUTOMATED COUNT: 15.6 % (ref 11.5–14.5)
ERYTHROCYTE [DISTWIDTH] IN BLOOD BY AUTOMATED COUNT: 15.7 % (ref 11.5–14.5)
ERYTHROCYTE [DISTWIDTH] IN BLOOD BY AUTOMATED COUNT: 15.8 % (ref 11.5–14.5)
ERYTHROCYTE [DISTWIDTH] IN BLOOD BY AUTOMATED COUNT: 15.9 % (ref 11.5–14.5)
ERYTHROCYTE [DISTWIDTH] IN BLOOD BY AUTOMATED COUNT: 16 % (ref 11.5–14.5)
ERYTHROCYTE [DISTWIDTH] IN BLOOD BY AUTOMATED COUNT: 16.1 % (ref 11.5–14.5)
ERYTHROCYTE [DISTWIDTH] IN BLOOD BY AUTOMATED COUNT: 16.1 % (ref 11.5–14.5)
ERYTHROCYTE [DISTWIDTH] IN BLOOD BY AUTOMATED COUNT: 16.2 % (ref 11.5–14.5)
ERYTHROCYTE [DISTWIDTH] IN BLOOD BY AUTOMATED COUNT: 16.3 % (ref 11.5–14.5)
ERYTHROCYTE [DISTWIDTH] IN BLOOD BY AUTOMATED COUNT: 16.7 % (ref 11.5–14.5)
ERYTHROCYTE [DISTWIDTH] IN BLOOD BY AUTOMATED COUNT: 16.9 % (ref 11.5–14.5)
ERYTHROCYTE [DISTWIDTH] IN BLOOD BY AUTOMATED COUNT: 17 % (ref 11.5–14.5)
ERYTHROCYTE [DISTWIDTH] IN BLOOD BY AUTOMATED COUNT: 17.5 % (ref 11.5–14.5)
ERYTHROCYTE [DISTWIDTH] IN BLOOD BY AUTOMATED COUNT: 17.7 % (ref 11.5–14.5)
ERYTHROCYTE [DISTWIDTH] IN BLOOD BY AUTOMATED COUNT: 18.4 % (ref 11.5–14.5)
ERYTHROCYTE [SEDIMENTATION RATE] IN BLOOD BY WESTERGREN METHOD: 20 MM/H
ERYTHROCYTE [SEDIMENTATION RATE] IN BLOOD BY WESTERGREN METHOD: 42 MM/HR (ref 0–36)
EST. GFR  (AFRICAN AMERICAN): 34.5 ML/MIN/1.73 M^2
EST. GFR  (AFRICAN AMERICAN): 47.7 ML/MIN/1.73 M^2
EST. GFR  (AFRICAN AMERICAN): 52.5 ML/MIN/1.73 M^2
EST. GFR  (AFRICAN AMERICAN): 58.4 ML/MIN/1.73 M^2
EST. GFR  (AFRICAN AMERICAN): >60 ML/MIN/1.73 M^2
EST. GFR  (NON AFRICAN AMERICAN): 29.9 ML/MIN/1.73 M^2
EST. GFR  (NON AFRICAN AMERICAN): 41.4 ML/MIN/1.73 M^2
EST. GFR  (NON AFRICAN AMERICAN): 45.6 ML/MIN/1.73 M^2
EST. GFR  (NON AFRICAN AMERICAN): 50.6 ML/MIN/1.73 M^2
EST. GFR  (NON AFRICAN AMERICAN): 56.8 ML/MIN/1.73 M^2
EST. GFR  (NON AFRICAN AMERICAN): >60 ML/MIN/1.73 M^2
ESTIMATED AVG GLUCOSE: 120 MG/DL (ref 68–131)
ESTIMATED AVG GLUCOSE: 126 MG/DL (ref 68–131)
ESTIMATED AVG GLUCOSE: 128 MG/DL (ref 68–131)
FERRITIN SERPL-MCNC: ABNORMAL NG/ML (ref 20–300)
FINAL PATHOLOGIC DIAGNOSIS: NORMAL
FIO2: 36
FLOW: 4
FRACTIONAL SHORTENING: 26 % (ref 28–44)
FRACTIONAL SHORTENING: 28 % (ref 28–44)
FUNGUS SPEC CULT: NORMAL
GIANT PLATELETS BLD QL SMEAR: PRESENT
GLUCOSE SERPL-MCNC: 100 MG/DL (ref 70–110)
GLUCOSE SERPL-MCNC: 101 MG/DL (ref 70–110)
GLUCOSE SERPL-MCNC: 102 MG/DL (ref 70–110)
GLUCOSE SERPL-MCNC: 103 MG/DL (ref 70–110)
GLUCOSE SERPL-MCNC: 105 MG/DL (ref 70–110)
GLUCOSE SERPL-MCNC: 105 MG/DL (ref 70–110)
GLUCOSE SERPL-MCNC: 106 MG/DL (ref 70–110)
GLUCOSE SERPL-MCNC: 106 MG/DL (ref 70–110)
GLUCOSE SERPL-MCNC: 107 MG/DL (ref 70–110)
GLUCOSE SERPL-MCNC: 108 MG/DL (ref 70–110)
GLUCOSE SERPL-MCNC: 110 MG/DL (ref 70–110)
GLUCOSE SERPL-MCNC: 111 MG/DL (ref 70–110)
GLUCOSE SERPL-MCNC: 112 MG/DL (ref 70–110)
GLUCOSE SERPL-MCNC: 113 MG/DL (ref 70–110)
GLUCOSE SERPL-MCNC: 116 MG/DL (ref 70–110)
GLUCOSE SERPL-MCNC: 118 MG/DL (ref 70–110)
GLUCOSE SERPL-MCNC: 118 MG/DL (ref 70–110)
GLUCOSE SERPL-MCNC: 119 MG/DL (ref 70–110)
GLUCOSE SERPL-MCNC: 122 MG/DL (ref 70–110)
GLUCOSE SERPL-MCNC: 124 MG/DL (ref 70–110)
GLUCOSE SERPL-MCNC: 125 MG/DL (ref 70–110)
GLUCOSE SERPL-MCNC: 129 MG/DL (ref 70–110)
GLUCOSE SERPL-MCNC: 130 MG/DL (ref 70–110)
GLUCOSE SERPL-MCNC: 132 MG/DL (ref 70–110)
GLUCOSE SERPL-MCNC: 135 MG/DL (ref 70–110)
GLUCOSE SERPL-MCNC: 142 MG/DL (ref 70–110)
GLUCOSE SERPL-MCNC: 143 MG/DL (ref 70–110)
GLUCOSE SERPL-MCNC: 148 MG/DL (ref 70–110)
GLUCOSE SERPL-MCNC: 150 MG/DL (ref 70–110)
GLUCOSE SERPL-MCNC: 152 MG/DL (ref 70–110)
GLUCOSE SERPL-MCNC: 154 MG/DL (ref 70–110)
GLUCOSE SERPL-MCNC: 154 MG/DL (ref 70–110)
GLUCOSE SERPL-MCNC: 156 MG/DL (ref 70–110)
GLUCOSE SERPL-MCNC: 190 MG/DL (ref 70–110)
GLUCOSE SERPL-MCNC: 196 MG/DL (ref 70–110)
GLUCOSE SERPL-MCNC: 382 MG/DL (ref 70–110)
GLUCOSE SERPL-MCNC: 395 MG/DL (ref 70–110)
GLUCOSE SERPL-MCNC: 85 MG/DL (ref 70–110)
GLUCOSE SERPL-MCNC: 87 MG/DL (ref 70–110)
GLUCOSE SERPL-MCNC: 89 MG/DL (ref 70–110)
GLUCOSE SERPL-MCNC: 91 MG/DL (ref 70–110)
GLUCOSE SERPL-MCNC: 91 MG/DL (ref 70–110)
GLUCOSE SERPL-MCNC: 92 MG/DL (ref 70–110)
GLUCOSE SERPL-MCNC: 92 MG/DL (ref 70–110)
GLUCOSE SERPL-MCNC: 97 MG/DL (ref 70–110)
GLUCOSE SERPL-MCNC: 98 MG/DL (ref 70–110)
GLUCOSE SERPL-MCNC: 98 MG/DL (ref 70–110)
GLUCOSE UR QL STRIP: NEGATIVE
GRAM STN SPEC: NORMAL
HBA1C MFR BLD: 5.8 % (ref 4–5.6)
HBA1C MFR BLD: 6 % (ref 4–5.6)
HBA1C MFR BLD: 6.1 % (ref 4–5.6)
HCO3 UR-SCNC: 31.6 MMOL/L (ref 24–28)
HCT VFR BLD AUTO: 16.6 % (ref 37–48.5)
HCT VFR BLD AUTO: 17.9 % (ref 37–48.5)
HCT VFR BLD AUTO: 18 % (ref 37–48.5)
HCT VFR BLD AUTO: 18.2 % (ref 37–48.5)
HCT VFR BLD AUTO: 20.3 % (ref 37–48.5)
HCT VFR BLD AUTO: 20.5 % (ref 37–48.5)
HCT VFR BLD AUTO: 20.8 % (ref 37–48.5)
HCT VFR BLD AUTO: 20.9 % (ref 37–48.5)
HCT VFR BLD AUTO: 20.9 % (ref 37–48.5)
HCT VFR BLD AUTO: 21.2 % (ref 37–48.5)
HCT VFR BLD AUTO: 21.8 % (ref 37–48.5)
HCT VFR BLD AUTO: 21.8 % (ref 37–48.5)
HCT VFR BLD AUTO: 21.9 % (ref 37–48.5)
HCT VFR BLD AUTO: 21.9 % (ref 37–48.5)
HCT VFR BLD AUTO: 22 % (ref 37–48.5)
HCT VFR BLD AUTO: 22 % (ref 37–48.5)
HCT VFR BLD AUTO: 22.1 % (ref 37–48.5)
HCT VFR BLD AUTO: 22.3 % (ref 37–48.5)
HCT VFR BLD AUTO: 22.3 % (ref 37–48.5)
HCT VFR BLD AUTO: 22.4 % (ref 37–48.5)
HCT VFR BLD AUTO: 22.5 % (ref 37–48.5)
HCT VFR BLD AUTO: 22.6 % (ref 37–48.5)
HCT VFR BLD AUTO: 22.6 % (ref 37–48.5)
HCT VFR BLD AUTO: 22.7 % (ref 37–48.5)
HCT VFR BLD AUTO: 23 % (ref 37–48.5)
HCT VFR BLD AUTO: 23.1 % (ref 37–48.5)
HCT VFR BLD AUTO: 23.2 % (ref 37–48.5)
HCT VFR BLD AUTO: 23.3 % (ref 37–48.5)
HCT VFR BLD AUTO: 23.5 % (ref 37–48.5)
HCT VFR BLD AUTO: 23.8 % (ref 37–48.5)
HCT VFR BLD AUTO: 23.8 % (ref 37–48.5)
HCT VFR BLD AUTO: 24.1 % (ref 37–48.5)
HCT VFR BLD AUTO: 24.6 % (ref 37–48.5)
HCT VFR BLD AUTO: 24.6 % (ref 37–48.5)
HCT VFR BLD AUTO: 24.8 % (ref 37–48.5)
HCT VFR BLD AUTO: 25 % (ref 37–48.5)
HCT VFR BLD AUTO: 25.3 % (ref 37–48.5)
HCT VFR BLD AUTO: 25.4 % (ref 37–48.5)
HCT VFR BLD AUTO: 25.5 % (ref 37–48.5)
HCT VFR BLD AUTO: 25.7 % (ref 37–48.5)
HCT VFR BLD AUTO: 25.8 % (ref 37–48.5)
HCT VFR BLD AUTO: 26 % (ref 37–48.5)
HCT VFR BLD AUTO: 26.1 % (ref 37–48.5)
HCT VFR BLD AUTO: 26.2 % (ref 37–48.5)
HCT VFR BLD AUTO: 26.6 % (ref 37–48.5)
HCT VFR BLD AUTO: 26.6 % (ref 37–48.5)
HCT VFR BLD AUTO: 26.7 % (ref 37–48.5)
HCT VFR BLD AUTO: 27.2 % (ref 37–48.5)
HCT VFR BLD AUTO: 29 % (ref 37–48.5)
HCT VFR BLD AUTO: 33.7 % (ref 37–48.5)
HCT VFR BLD CALC: 19 %PCV (ref 36–54)
HCT VFR BLD CALC: 21 %PCV (ref 36–54)
HCV AB SERPL QL IA: NEGATIVE
HDLC SERPL-MCNC: 25 MG/DL (ref 40–75)
HDLC SERPL: 20 % (ref 20–50)
HGB BLD-MCNC: 10.3 G/DL (ref 12–16)
HGB BLD-MCNC: 4.9 G/DL (ref 12–16)
HGB BLD-MCNC: 5.4 G/DL (ref 12–16)
HGB BLD-MCNC: 5.5 G/DL (ref 12–16)
HGB BLD-MCNC: 5.5 G/DL (ref 12–16)
HGB BLD-MCNC: 6.4 G/DL (ref 12–16)
HGB BLD-MCNC: 6.5 G/DL (ref 12–16)
HGB BLD-MCNC: 6.7 G/DL (ref 12–16)
HGB BLD-MCNC: 6.8 G/DL (ref 12–16)
HGB BLD-MCNC: 6.8 G/DL (ref 12–16)
HGB BLD-MCNC: 6.9 G/DL (ref 12–16)
HGB BLD-MCNC: 7 G/DL (ref 12–16)
HGB BLD-MCNC: 7.1 G/DL (ref 12–16)
HGB BLD-MCNC: 7.2 G/DL (ref 12–16)
HGB BLD-MCNC: 7.3 G/DL (ref 12–16)
HGB BLD-MCNC: 7.4 G/DL (ref 12–16)
HGB BLD-MCNC: 7.5 G/DL (ref 12–16)
HGB BLD-MCNC: 7.5 G/DL (ref 12–16)
HGB BLD-MCNC: 7.6 G/DL (ref 12–16)
HGB BLD-MCNC: 7.6 G/DL (ref 12–16)
HGB BLD-MCNC: 7.7 G/DL (ref 12–16)
HGB BLD-MCNC: 7.8 G/DL (ref 12–16)
HGB BLD-MCNC: 7.9 G/DL (ref 12–16)
HGB BLD-MCNC: 8 G/DL (ref 12–16)
HGB BLD-MCNC: 8.1 G/DL (ref 12–16)
HGB BLD-MCNC: 8.2 G/DL (ref 12–16)
HGB BLD-MCNC: 8.3 G/DL (ref 12–16)
HGB BLD-MCNC: 8.4 G/DL (ref 12–16)
HGB BLD-MCNC: 8.7 G/DL (ref 12–16)
HGB BLD-MCNC: 8.9 G/DL (ref 12–16)
HGB UR QL STRIP: ABNORMAL
HGB UR QL STRIP: ABNORMAL
HGB UR QL STRIP: NEGATIVE
HYALINE CASTS UR QL AUTO: 0 /LPF
HYALINE CASTS UR QL AUTO: 2 /LPF
HYPOCHROMIA BLD QL SMEAR: ABNORMAL
IMM GRANULOCYTES # BLD AUTO: 0.03 K/UL (ref 0–0.04)
IMM GRANULOCYTES # BLD AUTO: 0.06 K/UL (ref 0–0.04)
IMM GRANULOCYTES # BLD AUTO: 0.07 K/UL (ref 0–0.04)
IMM GRANULOCYTES # BLD AUTO: 0.09 K/UL (ref 0–0.04)
IMM GRANULOCYTES # BLD AUTO: 0.09 K/UL (ref 0–0.04)
IMM GRANULOCYTES # BLD AUTO: 0.13 K/UL (ref 0–0.04)
IMM GRANULOCYTES # BLD AUTO: ABNORMAL K/UL (ref 0–0.04)
IMM GRANULOCYTES NFR BLD AUTO: 1.3 % (ref 0–0.5)
IMM GRANULOCYTES NFR BLD AUTO: 1.8 % (ref 0–0.5)
IMM GRANULOCYTES NFR BLD AUTO: 2.8 % (ref 0–0.5)
IMM GRANULOCYTES NFR BLD AUTO: 2.9 % (ref 0–0.5)
IMM GRANULOCYTES NFR BLD AUTO: 2.9 % (ref 0–0.5)
IMM GRANULOCYTES NFR BLD AUTO: 3.2 % (ref 0–0.5)
IMM GRANULOCYTES NFR BLD AUTO: 3.6 % (ref 0–0.5)
IMM GRANULOCYTES NFR BLD AUTO: 4 % (ref 0–0.5)
IMM GRANULOCYTES NFR BLD AUTO: 5 % (ref 0–0.5)
IMM GRANULOCYTES NFR BLD AUTO: ABNORMAL % (ref 0–0.5)
INR PPP: 1.1 (ref 0.8–1.2)
INR PPP: 1.1 (ref 0.8–1.2)
INTERVENTRICULAR SEPTUM: 1.01 CM (ref 0.6–1.1)
INTERVENTRICULAR SEPTUM: 1.05 CM (ref 0.6–1.1)
KETONES UR QL STRIP: NEGATIVE
KOH PREP SPEC: NORMAL
LA MAJOR: 6.43 CM
LA MAJOR: 7.05 CM
LA MINOR: 6.36 CM
LA MINOR: 6.43 CM
LA WIDTH: 4.3 CM
LA WIDTH: 4.91 CM
LACTATE SERPL-SCNC: 1.4 MMOL/L (ref 0.5–2.2)
LACTATE SERPL-SCNC: 10.7 MMOL/L (ref 0.5–2.2)
LACTATE SERPL-SCNC: 4.9 MMOL/L (ref 0.5–2.2)
LDLC SERPL CALC-MCNC: 57.6 MG/DL (ref 63–159)
LEFT ATRIUM SIZE: 3.67 CM
LEFT ATRIUM SIZE: 4.36 CM
LEFT ATRIUM VOLUME INDEX MOD: 47.5 ML/M2
LEFT ATRIUM VOLUME INDEX: 47.4 ML/M2
LEFT ATRIUM VOLUME INDEX: 68.8 ML/M2
LEFT ATRIUM VOLUME MOD: 85.9 CM3
LEFT ATRIUM VOLUME: 122.38 CM3
LEFT ATRIUM VOLUME: 85.78 CM3
LEFT INTERNAL DIMENSION IN SYSTOLE: 2.92 CM (ref 2.1–4)
LEFT INTERNAL DIMENSION IN SYSTOLE: 3.68 CM (ref 2.1–4)
LEFT VENTRICLE DIASTOLIC VOLUME INDEX: 41.19 ML/M2
LEFT VENTRICLE DIASTOLIC VOLUME INDEX: 63.74 ML/M2
LEFT VENTRICLE DIASTOLIC VOLUME: 115.37 ML
LEFT VENTRICLE DIASTOLIC VOLUME: 73.31 ML
LEFT VENTRICLE MASS INDEX: 84 G/M2
LEFT VENTRICLE MASS INDEX: 96 G/M2
LEFT VENTRICLE SYSTOLIC VOLUME INDEX: 18.4 ML/M2
LEFT VENTRICLE SYSTOLIC VOLUME INDEX: 31.7 ML/M2
LEFT VENTRICLE SYSTOLIC VOLUME: 32.69 ML
LEFT VENTRICLE SYSTOLIC VOLUME: 57.42 ML
LEFT VENTRICULAR INTERNAL DIMENSION IN DIASTOLE: 4.08 CM (ref 3.5–6)
LEFT VENTRICULAR INTERNAL DIMENSION IN DIASTOLE: 4.95 CM (ref 3.5–6)
LEFT VENTRICULAR MASS: 150.16 G
LEFT VENTRICULAR MASS: 173.01 G
LEUKOCYTE ESTERASE UR QL STRIP: NEGATIVE
LV LATERAL E/E' RATIO: 9.67 M/S
LV LATERAL E/E' RATIO: 9.88 M/S
LV SEPTAL E/E' RATIO: 14.5 M/S
LV SEPTAL E/E' RATIO: 9.88 M/S
LYMPHOCYTES # BLD AUTO: 0.6 K/UL (ref 1–4.8)
LYMPHOCYTES # BLD AUTO: 0.6 K/UL (ref 1–4.8)
LYMPHOCYTES # BLD AUTO: 0.7 K/UL (ref 1–4.8)
LYMPHOCYTES # BLD AUTO: 0.9 K/UL (ref 1–4.8)
LYMPHOCYTES # BLD AUTO: 1 K/UL (ref 1–4.8)
LYMPHOCYTES # BLD AUTO: ABNORMAL K/UL (ref 1–4.8)
LYMPHOCYTES NFR BLD: 10 % (ref 18–48)
LYMPHOCYTES NFR BLD: 11 % (ref 18–48)
LYMPHOCYTES NFR BLD: 12 % (ref 18–48)
LYMPHOCYTES NFR BLD: 12 % (ref 18–48)
LYMPHOCYTES NFR BLD: 13 % (ref 18–48)
LYMPHOCYTES NFR BLD: 13 % (ref 18–48)
LYMPHOCYTES NFR BLD: 14 % (ref 18–48)
LYMPHOCYTES NFR BLD: 15 % (ref 18–48)
LYMPHOCYTES NFR BLD: 16 % (ref 18–48)
LYMPHOCYTES NFR BLD: 16 % (ref 18–48)
LYMPHOCYTES NFR BLD: 17 % (ref 18–48)
LYMPHOCYTES NFR BLD: 2 % (ref 18–48)
LYMPHOCYTES NFR BLD: 2 % (ref 18–48)
LYMPHOCYTES NFR BLD: 20 % (ref 18–48)
LYMPHOCYTES NFR BLD: 25 % (ref 18–48)
LYMPHOCYTES NFR BLD: 26 % (ref 18–48)
LYMPHOCYTES NFR BLD: 27 % (ref 18–48)
LYMPHOCYTES NFR BLD: 27.9 % (ref 18–48)
LYMPHOCYTES NFR BLD: 27.9 % (ref 18–48)
LYMPHOCYTES NFR BLD: 28 % (ref 18–48)
LYMPHOCYTES NFR BLD: 28.8 % (ref 18–48)
LYMPHOCYTES NFR BLD: 3 % (ref 18–48)
LYMPHOCYTES NFR BLD: 30 % (ref 18–48)
LYMPHOCYTES NFR BLD: 30.3 % (ref 18–48)
LYMPHOCYTES NFR BLD: 31 % (ref 18–48)
LYMPHOCYTES NFR BLD: 31.9 % (ref 18–48)
LYMPHOCYTES NFR BLD: 32.7 % (ref 18–48)
LYMPHOCYTES NFR BLD: 34.6 % (ref 18–48)
LYMPHOCYTES NFR BLD: 36 % (ref 18–48)
LYMPHOCYTES NFR BLD: 37 % (ref 18–48)
LYMPHOCYTES NFR BLD: 4 % (ref 18–48)
LYMPHOCYTES NFR BLD: 4 % (ref 18–48)
LYMPHOCYTES NFR BLD: 4.7 % (ref 18–48)
LYMPHOCYTES NFR BLD: 40 % (ref 18–48)
LYMPHOCYTES NFR BLD: 5 % (ref 18–48)
LYMPHOCYTES NFR BLD: 5 % (ref 18–48)
LYMPHOCYTES NFR BLD: 6 % (ref 18–48)
LYMPHOCYTES NFR BLD: 6 % (ref 18–48)
LYMPHOCYTES NFR BLD: 7 % (ref 18–48)
LYMPHOCYTES NFR BLD: 8 % (ref 18–48)
LYMPHOCYTES NFR BLD: 8 % (ref 18–48)
LYMPHOCYTES NFR BLD: 9 % (ref 18–48)
LYMPHOCYTES NFR BLD: 9 % (ref 18–48)
LYMPHOCYTES NFR FLD MANUAL: 36 %
Lab: NORMAL
MAGNESIUM SERPL-MCNC: 1.3 MG/DL (ref 1.6–2.6)
MAGNESIUM SERPL-MCNC: 1.3 MG/DL (ref 1.6–2.6)
MAGNESIUM SERPL-MCNC: 1.4 MG/DL (ref 1.6–2.6)
MAGNESIUM SERPL-MCNC: 1.5 MG/DL (ref 1.6–2.6)
MAGNESIUM SERPL-MCNC: 1.6 MG/DL (ref 1.6–2.6)
MAGNESIUM SERPL-MCNC: 1.7 MG/DL (ref 1.6–2.6)
MAGNESIUM SERPL-MCNC: 1.8 MG/DL (ref 1.6–2.6)
MAGNESIUM SERPL-MCNC: 1.9 MG/DL (ref 1.6–2.6)
MCH RBC QN AUTO: 28.4 PG (ref 27–31)
MCH RBC QN AUTO: 28.5 PG (ref 27–31)
MCH RBC QN AUTO: 28.7 PG (ref 27–31)
MCH RBC QN AUTO: 28.8 PG (ref 27–31)
MCH RBC QN AUTO: 28.8 PG (ref 27–31)
MCH RBC QN AUTO: 28.9 PG (ref 27–31)
MCH RBC QN AUTO: 29.1 PG (ref 27–31)
MCH RBC QN AUTO: 29.2 PG (ref 27–31)
MCH RBC QN AUTO: 29.3 PG (ref 27–31)
MCH RBC QN AUTO: 29.4 PG (ref 27–31)
MCH RBC QN AUTO: 29.4 PG (ref 27–31)
MCH RBC QN AUTO: 29.5 PG (ref 27–31)
MCH RBC QN AUTO: 29.6 PG (ref 27–31)
MCH RBC QN AUTO: 29.7 PG (ref 27–31)
MCH RBC QN AUTO: 29.8 PG (ref 27–31)
MCH RBC QN AUTO: 29.9 PG (ref 27–31)
MCH RBC QN AUTO: 29.9 PG (ref 27–31)
MCH RBC QN AUTO: 30 PG (ref 27–31)
MCH RBC QN AUTO: 30.1 PG (ref 27–31)
MCH RBC QN AUTO: 30.2 PG (ref 27–31)
MCH RBC QN AUTO: 30.4 PG (ref 27–31)
MCH RBC QN AUTO: 30.5 PG (ref 27–31)
MCH RBC QN AUTO: 30.5 PG (ref 27–31)
MCH RBC QN AUTO: 30.6 PG (ref 27–31)
MCH RBC QN AUTO: 30.7 PG (ref 27–31)
MCH RBC QN AUTO: 30.8 PG (ref 27–31)
MCH RBC QN AUTO: 30.8 PG (ref 27–31)
MCH RBC QN AUTO: 30.9 PG (ref 27–31)
MCH RBC QN AUTO: 30.9 PG (ref 27–31)
MCH RBC QN AUTO: 31 PG (ref 27–31)
MCH RBC QN AUTO: 31.8 PG (ref 27–31)
MCHC RBC AUTO-ENTMCNC: 29.4 G/DL (ref 32–36)
MCHC RBC AUTO-ENTMCNC: 29.5 G/DL (ref 32–36)
MCHC RBC AUTO-ENTMCNC: 29.7 G/DL (ref 32–36)
MCHC RBC AUTO-ENTMCNC: 30.6 G/DL (ref 32–36)
MCHC RBC AUTO-ENTMCNC: 30.6 G/DL (ref 32–36)
MCHC RBC AUTO-ENTMCNC: 30.7 G/DL (ref 32–36)
MCHC RBC AUTO-ENTMCNC: 30.8 G/DL (ref 32–36)
MCHC RBC AUTO-ENTMCNC: 30.9 G/DL (ref 32–36)
MCHC RBC AUTO-ENTMCNC: 31 G/DL (ref 32–36)
MCHC RBC AUTO-ENTMCNC: 31 G/DL (ref 32–36)
MCHC RBC AUTO-ENTMCNC: 31.1 G/DL (ref 32–36)
MCHC RBC AUTO-ENTMCNC: 31.2 G/DL (ref 32–36)
MCHC RBC AUTO-ENTMCNC: 31.3 G/DL (ref 32–36)
MCHC RBC AUTO-ENTMCNC: 31.4 G/DL (ref 32–36)
MCHC RBC AUTO-ENTMCNC: 31.5 G/DL (ref 32–36)
MCHC RBC AUTO-ENTMCNC: 31.6 G/DL (ref 32–36)
MCHC RBC AUTO-ENTMCNC: 31.6 G/DL (ref 32–36)
MCHC RBC AUTO-ENTMCNC: 31.7 G/DL (ref 32–36)
MCHC RBC AUTO-ENTMCNC: 31.8 G/DL (ref 32–36)
MCHC RBC AUTO-ENTMCNC: 31.9 G/DL (ref 32–36)
MCHC RBC AUTO-ENTMCNC: 31.9 G/DL (ref 32–36)
MCHC RBC AUTO-ENTMCNC: 32 G/DL (ref 32–36)
MCHC RBC AUTO-ENTMCNC: 32.1 G/DL (ref 32–36)
MCHC RBC AUTO-ENTMCNC: 32.1 G/DL (ref 32–36)
MCHC RBC AUTO-ENTMCNC: 32.2 G/DL (ref 32–36)
MCHC RBC AUTO-ENTMCNC: 32.2 G/DL (ref 32–36)
MCHC RBC AUTO-ENTMCNC: 32.3 G/DL (ref 32–36)
MCHC RBC AUTO-ENTMCNC: 32.4 G/DL (ref 32–36)
MCHC RBC AUTO-ENTMCNC: 32.5 G/DL (ref 32–36)
MCHC RBC AUTO-ENTMCNC: 32.6 G/DL (ref 32–36)
MCHC RBC AUTO-ENTMCNC: 32.7 G/DL (ref 32–36)
MCHC RBC AUTO-ENTMCNC: 33 G/DL (ref 32–36)
MCHC RBC AUTO-ENTMCNC: 33 G/DL (ref 32–36)
MCHC RBC AUTO-ENTMCNC: 33.5 G/DL (ref 32–36)
MCHC RBC AUTO-ENTMCNC: 33.8 G/DL (ref 32–36)
MCV RBC AUTO: 100 FL (ref 82–98)
MCV RBC AUTO: 101 FL (ref 82–98)
MCV RBC AUTO: 101 FL (ref 82–98)
MCV RBC AUTO: 91 FL (ref 82–98)
MCV RBC AUTO: 92 FL (ref 82–98)
MCV RBC AUTO: 93 FL (ref 82–98)
MCV RBC AUTO: 94 FL (ref 82–98)
MCV RBC AUTO: 95 FL (ref 82–98)
MCV RBC AUTO: 96 FL (ref 82–98)
MCV RBC AUTO: 97 FL (ref 82–98)
MCV RBC AUTO: 98 FL (ref 82–98)
MCV RBC AUTO: 98 FL (ref 82–98)
METAMYELOCYTES NFR BLD MANUAL: 1 %
METAMYELOCYTES NFR BLD MANUAL: 1.3 %
METAMYELOCYTES NFR BLD MANUAL: 2 %
METAMYELOCYTES NFR BLD MANUAL: 3 %
METAMYELOCYTES NFR BLD MANUAL: 4 %
MICROALBUMIN UR DL<=1MG/L-MCNC: 26 UG/ML
MICROSCOPIC COMMENT: ABNORMAL
MICROSCOPIC COMMENT: NORMAL
MODE: ABNORMAL
MONOCYTES # BLD AUTO: 0.1 K/UL (ref 0.3–1)
MONOCYTES # BLD AUTO: 0.2 K/UL (ref 0.3–1)
MONOCYTES # BLD AUTO: ABNORMAL K/UL (ref 0.3–1)
MONOCYTES NFR BLD: 0 % (ref 4–15)
MONOCYTES NFR BLD: 1 % (ref 4–15)
MONOCYTES NFR BLD: 10 % (ref 4–15)
MONOCYTES NFR BLD: 10.1 % (ref 4–15)
MONOCYTES NFR BLD: 11 % (ref 4–15)
MONOCYTES NFR BLD: 2 % (ref 4–15)
MONOCYTES NFR BLD: 2.1 % (ref 4–15)
MONOCYTES NFR BLD: 3 % (ref 4–15)
MONOCYTES NFR BLD: 3.1 % (ref 4–15)
MONOCYTES NFR BLD: 3.1 % (ref 4–15)
MONOCYTES NFR BLD: 3.2 % (ref 4–15)
MONOCYTES NFR BLD: 3.3 % (ref 4–15)
MONOCYTES NFR BLD: 3.3 % (ref 4–15)
MONOCYTES NFR BLD: 3.9 % (ref 4–15)
MONOCYTES NFR BLD: 4 % (ref 4–15)
MONOCYTES NFR BLD: 4.4 % (ref 4–15)
MONOCYTES NFR BLD: 4.9 % (ref 4–15)
MONOCYTES NFR BLD: 5 % (ref 4–15)
MONOCYTES NFR BLD: 5 % (ref 4–15)
MONOCYTES NFR BLD: 6 % (ref 4–15)
MONOCYTES NFR BLD: 7 % (ref 4–15)
MONOCYTES NFR BLD: 7 % (ref 4–15)
MONOCYTES NFR BLD: 8 % (ref 4–15)
MONOCYTES NFR BLD: 9 % (ref 4–15)
MONOCYTES NFR BLD: 9 % (ref 4–15)
MONOS+MACROS NFR FLD MANUAL: 27 %
MV A" WAVE DURATION": 10.28 MSEC
MV PEAK A VEL: 0.19 M/S
MV PEAK A VEL: 0.99 M/S
MV PEAK E VEL: 0.79 M/S
MV PEAK E VEL: 0.87 M/S
MV STENOSIS PRESSURE HALF TIME: 56.18 MS
MV VALVE AREA P 1/2 METHOD: 3.92 CM2
MYELOCYTES NFR BLD MANUAL: 1 %
MYELOCYTES NFR BLD MANUAL: 2 %
MYELOCYTES NFR BLD MANUAL: 3 %
MYELOCYTES NFR BLD MANUAL: 3.3 %
MYELOCYTES NFR BLD MANUAL: 4 %
MYELOCYTES NFR BLD MANUAL: 5 %
NEUTROPHILS # BLD AUTO: 1 K/UL (ref 1.8–7.7)
NEUTROPHILS # BLD AUTO: 1.1 K/UL (ref 1.8–7.7)
NEUTROPHILS # BLD AUTO: 1.2 K/UL (ref 1.8–7.7)
NEUTROPHILS # BLD AUTO: 1.3 K/UL (ref 1.8–7.7)
NEUTROPHILS # BLD AUTO: 1.3 K/UL (ref 1.8–7.7)
NEUTROPHILS # BLD AUTO: 1.4 K/UL (ref 1.8–7.7)
NEUTROPHILS # BLD AUTO: 2.2 K/UL (ref 1.8–7.7)
NEUTROPHILS NFR BLD: 39 % (ref 38–73)
NEUTROPHILS NFR BLD: 46.3 % (ref 38–73)
NEUTROPHILS NFR BLD: 47.8 % (ref 38–73)
NEUTROPHILS NFR BLD: 47.9 % (ref 38–73)
NEUTROPHILS NFR BLD: 48.8 % (ref 38–73)
NEUTROPHILS NFR BLD: 49 % (ref 38–73)
NEUTROPHILS NFR BLD: 49.3 % (ref 38–73)
NEUTROPHILS NFR BLD: 53 % (ref 38–73)
NEUTROPHILS NFR BLD: 54.7 % (ref 38–73)
NEUTROPHILS NFR BLD: 55 % (ref 38–73)
NEUTROPHILS NFR BLD: 56 % (ref 38–73)
NEUTROPHILS NFR BLD: 58.9 % (ref 38–73)
NEUTROPHILS NFR BLD: 59 % (ref 38–73)
NEUTROPHILS NFR BLD: 60 % (ref 38–73)
NEUTROPHILS NFR BLD: 63 % (ref 38–73)
NEUTROPHILS NFR BLD: 63.7 % (ref 38–73)
NEUTROPHILS NFR BLD: 64 % (ref 38–73)
NEUTROPHILS NFR BLD: 66 % (ref 38–73)
NEUTROPHILS NFR BLD: 67 % (ref 38–73)
NEUTROPHILS NFR BLD: 67 % (ref 38–73)
NEUTROPHILS NFR BLD: 68 % (ref 38–73)
NEUTROPHILS NFR BLD: 69 % (ref 38–73)
NEUTROPHILS NFR BLD: 70 % (ref 38–73)
NEUTROPHILS NFR BLD: 70 % (ref 38–73)
NEUTROPHILS NFR BLD: 71 % (ref 38–73)
NEUTROPHILS NFR BLD: 73 % (ref 38–73)
NEUTROPHILS NFR BLD: 74 % (ref 38–73)
NEUTROPHILS NFR BLD: 74 % (ref 38–73)
NEUTROPHILS NFR BLD: 76 % (ref 38–73)
NEUTROPHILS NFR BLD: 77 % (ref 38–73)
NEUTROPHILS NFR BLD: 78 % (ref 38–73)
NEUTROPHILS NFR BLD: 79 % (ref 38–73)
NEUTROPHILS NFR BLD: 80 % (ref 38–73)
NEUTROPHILS NFR BLD: 82 % (ref 38–73)
NEUTROPHILS NFR BLD: 82.7 % (ref 38–73)
NEUTROPHILS NFR BLD: 83 % (ref 38–73)
NEUTROPHILS NFR BLD: 83 % (ref 38–73)
NEUTROPHILS NFR BLD: 84 % (ref 38–73)
NEUTROPHILS NFR BLD: 85 % (ref 38–73)
NEUTROPHILS NFR BLD: 88 % (ref 38–73)
NEUTROPHILS NFR BLD: 88 % (ref 38–73)
NEUTROPHILS NFR BLD: 90 % (ref 38–73)
NEUTROPHILS NFR BLD: 92 % (ref 38–73)
NEUTROPHILS NFR FLD MANUAL: 35 %
NEUTS BAND NFR BLD MANUAL: 1 %
NEUTS BAND NFR BLD MANUAL: 10 %
NEUTS BAND NFR BLD MANUAL: 12 %
NEUTS BAND NFR BLD MANUAL: 2 %
NEUTS BAND NFR BLD MANUAL: 3 %
NEUTS BAND NFR BLD MANUAL: 4 %
NEUTS BAND NFR BLD MANUAL: 5 %
NEUTS BAND NFR BLD MANUAL: 5 %
NEUTS BAND NFR BLD MANUAL: 6 %
NEUTS BAND NFR BLD MANUAL: 7 %
NITRITE UR QL STRIP: NEGATIVE
NONHDLC SERPL-MCNC: 100 MG/DL
NRBC BLD-RTO: 0 /100 WBC
NRBC BLD-RTO: 1 /100 WBC
NUM UNITS TRANS PACKED RBC: NORMAL
OSMOLALITY SERPL: 283 MOSM/KG (ref 275–295)
OSMOLALITY UR: 430 MOSM/KG (ref 50–1200)
OVALOCYTES BLD QL SMEAR: ABNORMAL
PATH REV BLD -IMP: NORMAL
PATH REV BLD -IMP: NORMAL
PCO2 BLDA: 45.8 MMHG (ref 35–45)
PH SMN: 7.45 [PH] (ref 7.35–7.45)
PH UR STRIP: 5 [PH] (ref 5–8)
PH UR STRIP: 6 [PH] (ref 5–8)
PH UR STRIP: 7 [PH] (ref 5–8)
PHOSPHATE SERPL-MCNC: 2.4 MG/DL (ref 2.7–4.5)
PHOSPHATE SERPL-MCNC: 2.5 MG/DL (ref 2.7–4.5)
PHOSPHATE SERPL-MCNC: 2.7 MG/DL (ref 2.7–4.5)
PHOSPHATE SERPL-MCNC: 2.8 MG/DL (ref 2.7–4.5)
PHOSPHATE SERPL-MCNC: 2.8 MG/DL (ref 2.7–4.5)
PHOSPHATE SERPL-MCNC: 2.9 MG/DL (ref 2.7–4.5)
PHOSPHATE SERPL-MCNC: 3 MG/DL (ref 2.7–4.5)
PHOSPHATE SERPL-MCNC: 3.1 MG/DL (ref 2.7–4.5)
PHOSPHATE SERPL-MCNC: 3.2 MG/DL (ref 2.7–4.5)
PHOSPHATE SERPL-MCNC: 3.3 MG/DL (ref 2.7–4.5)
PHOSPHATE SERPL-MCNC: 3.3 MG/DL (ref 2.7–4.5)
PHOSPHATE SERPL-MCNC: 3.4 MG/DL (ref 2.7–4.5)
PHOSPHATE SERPL-MCNC: 3.5 MG/DL (ref 2.7–4.5)
PHOSPHATE SERPL-MCNC: 3.6 MG/DL (ref 2.7–4.5)
PHOSPHATE SERPL-MCNC: 3.7 MG/DL (ref 2.7–4.5)
PHOSPHATE SERPL-MCNC: 3.9 MG/DL (ref 2.7–4.5)
PHOSPHATE SERPL-MCNC: 3.9 MG/DL (ref 2.7–4.5)
PHOSPHATE SERPL-MCNC: 4 MG/DL (ref 2.7–4.5)
PHOSPHATE SERPL-MCNC: 4 MG/DL (ref 2.7–4.5)
PHOSPHATE SERPL-MCNC: 4.3 MG/DL (ref 2.7–4.5)
PHOSPHATE SERPL-MCNC: 4.5 MG/DL (ref 2.7–4.5)
PISA MRMAX VEL: 0.06 M/S
PLATELET # BLD AUTO: 107 K/UL (ref 150–450)
PLATELET # BLD AUTO: 110 K/UL (ref 150–450)
PLATELET # BLD AUTO: 112 K/UL (ref 150–450)
PLATELET # BLD AUTO: 113 K/UL (ref 150–450)
PLATELET # BLD AUTO: 114 K/UL (ref 150–450)
PLATELET # BLD AUTO: 116 K/UL (ref 150–450)
PLATELET # BLD AUTO: 117 K/UL (ref 150–450)
PLATELET # BLD AUTO: 117 K/UL (ref 150–450)
PLATELET # BLD AUTO: 119 K/UL (ref 150–450)
PLATELET # BLD AUTO: 119 K/UL (ref 150–450)
PLATELET # BLD AUTO: 120 K/UL (ref 150–450)
PLATELET # BLD AUTO: 121 K/UL (ref 150–450)
PLATELET # BLD AUTO: 128 K/UL (ref 150–450)
PLATELET # BLD AUTO: 129 K/UL (ref 150–450)
PLATELET # BLD AUTO: 129 K/UL (ref 150–450)
PLATELET # BLD AUTO: 130 K/UL (ref 150–450)
PLATELET # BLD AUTO: 134 K/UL (ref 150–450)
PLATELET # BLD AUTO: 135 K/UL (ref 150–450)
PLATELET # BLD AUTO: 137 K/UL (ref 150–450)
PLATELET # BLD AUTO: 138 K/UL (ref 150–450)
PLATELET # BLD AUTO: 139 K/UL (ref 150–450)
PLATELET # BLD AUTO: 141 K/UL (ref 150–450)
PLATELET # BLD AUTO: 144 K/UL (ref 150–450)
PLATELET # BLD AUTO: 144 K/UL (ref 150–450)
PLATELET # BLD AUTO: 146 K/UL (ref 150–450)
PLATELET # BLD AUTO: 149 K/UL (ref 150–450)
PLATELET # BLD AUTO: 155 K/UL (ref 150–450)
PLATELET # BLD AUTO: 156 K/UL (ref 150–450)
PLATELET # BLD AUTO: 160 K/UL (ref 150–450)
PLATELET # BLD AUTO: 165 K/UL (ref 150–450)
PLATELET # BLD AUTO: 168 K/UL (ref 150–450)
PLATELET # BLD AUTO: 171 K/UL (ref 150–450)
PLATELET # BLD AUTO: 173 K/UL (ref 150–450)
PLATELET # BLD AUTO: 174 K/UL (ref 150–450)
PLATELET # BLD AUTO: 176 K/UL (ref 150–450)
PLATELET # BLD AUTO: 183 K/UL (ref 150–450)
PLATELET # BLD AUTO: 187 K/UL (ref 150–450)
PLATELET # BLD AUTO: 188 K/UL (ref 150–450)
PLATELET # BLD AUTO: 188 K/UL (ref 150–450)
PLATELET # BLD AUTO: 210 K/UL (ref 150–450)
PLATELET # BLD AUTO: 233 K/UL (ref 150–450)
PLATELET # BLD AUTO: 274 K/UL (ref 150–450)
PLATELET # BLD AUTO: 275 K/UL (ref 150–450)
PLATELET # BLD AUTO: 280 K/UL (ref 150–450)
PLATELET # BLD AUTO: 300 K/UL (ref 150–450)
PLATELET # BLD AUTO: 350 K/UL (ref 150–450)
PLATELET # BLD AUTO: 83 K/UL (ref 150–450)
PLATELET # BLD AUTO: 86 K/UL (ref 150–450)
PLATELET # BLD AUTO: 86 K/UL (ref 150–450)
PLATELET # BLD AUTO: 91 K/UL (ref 150–450)
PLATELET # BLD AUTO: 91 K/UL (ref 150–450)
PLATELET # BLD AUTO: 96 K/UL (ref 150–450)
PLATELET BLD QL SMEAR: ABNORMAL
PMV BLD AUTO: 11.1 FL (ref 9.2–12.9)
PMV BLD AUTO: 11.3 FL (ref 9.2–12.9)
PMV BLD AUTO: 11.4 FL (ref 9.2–12.9)
PMV BLD AUTO: 11.4 FL (ref 9.2–12.9)
PMV BLD AUTO: 11.6 FL (ref 9.2–12.9)
PMV BLD AUTO: 11.7 FL (ref 9.2–12.9)
PMV BLD AUTO: 11.8 FL (ref 9.2–12.9)
PMV BLD AUTO: 11.9 FL (ref 9.2–12.9)
PMV BLD AUTO: 12 FL (ref 9.2–12.9)
PMV BLD AUTO: 12.1 FL (ref 9.2–12.9)
PMV BLD AUTO: 12.2 FL (ref 9.2–12.9)
PMV BLD AUTO: 12.3 FL (ref 9.2–12.9)
PMV BLD AUTO: 12.4 FL (ref 9.2–12.9)
PMV BLD AUTO: 12.4 FL (ref 9.2–12.9)
PMV BLD AUTO: 12.5 FL (ref 9.2–12.9)
PMV BLD AUTO: 12.6 FL (ref 9.2–12.9)
PMV BLD AUTO: 12.6 FL (ref 9.2–12.9)
PMV BLD AUTO: 12.7 FL (ref 9.2–12.9)
PMV BLD AUTO: 12.8 FL (ref 9.2–12.9)
PMV BLD AUTO: 12.8 FL (ref 9.2–12.9)
PMV BLD AUTO: 12.9 FL (ref 9.2–12.9)
PMV BLD AUTO: 13 FL (ref 9.2–12.9)
PMV BLD AUTO: 13.4 FL (ref 9.2–12.9)
PMV BLD AUTO: 13.4 FL (ref 9.2–12.9)
PMV BLD AUTO: 13.5 FL (ref 9.2–12.9)
PO2 BLDA: 57 MMHG (ref 80–100)
POC BE: 8 MMOL/L
POC IONIZED CALCIUM: 1.18 MMOL/L (ref 1.06–1.42)
POC IONIZED CALCIUM: 1.29 MMOL/L (ref 1.06–1.42)
POC MOLECULAR INFLUENZA A AGN: NEGATIVE
POC MOLECULAR INFLUENZA B AGN: NEGATIVE
POC PTINR: 1.2 (ref 0.9–1.2)
POC PTWBT: 14.6 SEC (ref 9.7–14.3)
POC SATURATED O2: 90 % (ref 95–100)
POC TCO2 (MEASURED): 17 MMOL/L (ref 23–29)
POC TCO2 (MEASURED): 25 MMOL/L (ref 23–29)
POC TCO2: 33 MMOL/L (ref 23–27)
POCT GLUCOSE: 100 MG/DL (ref 70–110)
POCT GLUCOSE: 100 MG/DL (ref 70–110)
POCT GLUCOSE: 102 MG/DL (ref 70–110)
POCT GLUCOSE: 102 MG/DL (ref 70–110)
POCT GLUCOSE: 103 MG/DL (ref 70–110)
POCT GLUCOSE: 103 MG/DL (ref 70–110)
POCT GLUCOSE: 105 MG/DL (ref 70–110)
POCT GLUCOSE: 107 MG/DL (ref 70–110)
POCT GLUCOSE: 108 MG/DL (ref 70–110)
POCT GLUCOSE: 109 MG/DL (ref 70–110)
POCT GLUCOSE: 110 MG/DL (ref 70–110)
POCT GLUCOSE: 110 MG/DL (ref 70–110)
POCT GLUCOSE: 111 MG/DL (ref 70–110)
POCT GLUCOSE: 112 MG/DL (ref 70–110)
POCT GLUCOSE: 113 MG/DL (ref 70–110)
POCT GLUCOSE: 113 MG/DL (ref 70–110)
POCT GLUCOSE: 114 MG/DL (ref 70–110)
POCT GLUCOSE: 115 MG/DL (ref 70–110)
POCT GLUCOSE: 116 MG/DL (ref 70–110)
POCT GLUCOSE: 117 MG/DL (ref 70–110)
POCT GLUCOSE: 117 MG/DL (ref 70–110)
POCT GLUCOSE: 118 MG/DL (ref 70–110)
POCT GLUCOSE: 119 MG/DL (ref 70–110)
POCT GLUCOSE: 119 MG/DL (ref 70–110)
POCT GLUCOSE: 120 MG/DL (ref 70–110)
POCT GLUCOSE: 121 MG/DL (ref 70–110)
POCT GLUCOSE: 122 MG/DL (ref 70–110)
POCT GLUCOSE: 123 MG/DL (ref 70–110)
POCT GLUCOSE: 123 MG/DL (ref 70–110)
POCT GLUCOSE: 124 MG/DL (ref 70–110)
POCT GLUCOSE: 125 MG/DL (ref 70–110)
POCT GLUCOSE: 127 MG/DL (ref 70–110)
POCT GLUCOSE: 128 MG/DL (ref 70–110)
POCT GLUCOSE: 128 MG/DL (ref 70–110)
POCT GLUCOSE: 129 MG/DL (ref 70–110)
POCT GLUCOSE: 129 MG/DL (ref 70–110)
POCT GLUCOSE: 130 MG/DL (ref 70–110)
POCT GLUCOSE: 130 MG/DL (ref 70–110)
POCT GLUCOSE: 134 MG/DL (ref 70–110)
POCT GLUCOSE: 135 MG/DL (ref 70–110)
POCT GLUCOSE: 136 MG/DL (ref 70–110)
POCT GLUCOSE: 137 MG/DL (ref 70–110)
POCT GLUCOSE: 138 MG/DL (ref 70–110)
POCT GLUCOSE: 138 MG/DL (ref 70–110)
POCT GLUCOSE: 139 MG/DL (ref 70–110)
POCT GLUCOSE: 140 MG/DL (ref 70–110)
POCT GLUCOSE: 142 MG/DL (ref 70–110)
POCT GLUCOSE: 142 MG/DL (ref 70–110)
POCT GLUCOSE: 143 MG/DL (ref 70–110)
POCT GLUCOSE: 143 MG/DL (ref 70–110)
POCT GLUCOSE: 144 MG/DL (ref 70–110)
POCT GLUCOSE: 144 MG/DL (ref 70–110)
POCT GLUCOSE: 145 MG/DL (ref 70–110)
POCT GLUCOSE: 146 MG/DL (ref 70–110)
POCT GLUCOSE: 147 MG/DL (ref 70–110)
POCT GLUCOSE: 147 MG/DL (ref 70–110)
POCT GLUCOSE: 149 MG/DL (ref 70–110)
POCT GLUCOSE: 151 MG/DL (ref 70–110)
POCT GLUCOSE: 152 MG/DL (ref 70–110)
POCT GLUCOSE: 153 MG/DL (ref 70–110)
POCT GLUCOSE: 153 MG/DL (ref 70–110)
POCT GLUCOSE: 155 MG/DL (ref 70–110)
POCT GLUCOSE: 157 MG/DL (ref 70–110)
POCT GLUCOSE: 157 MG/DL (ref 70–110)
POCT GLUCOSE: 162 MG/DL (ref 70–110)
POCT GLUCOSE: 164 MG/DL (ref 70–110)
POCT GLUCOSE: 166 MG/DL (ref 70–110)
POCT GLUCOSE: 167 MG/DL (ref 70–110)
POCT GLUCOSE: 168 MG/DL (ref 70–110)
POCT GLUCOSE: 170 MG/DL (ref 70–110)
POCT GLUCOSE: 174 MG/DL (ref 70–110)
POCT GLUCOSE: 175 MG/DL (ref 70–110)
POCT GLUCOSE: 176 MG/DL (ref 70–110)
POCT GLUCOSE: 177 MG/DL (ref 70–110)
POCT GLUCOSE: 177 MG/DL (ref 70–110)
POCT GLUCOSE: 178 MG/DL (ref 70–110)
POCT GLUCOSE: 181 MG/DL (ref 70–110)
POCT GLUCOSE: 184 MG/DL (ref 70–110)
POCT GLUCOSE: 188 MG/DL (ref 70–110)
POCT GLUCOSE: 188 MG/DL (ref 70–110)
POCT GLUCOSE: 189 MG/DL (ref 70–110)
POCT GLUCOSE: 195 MG/DL (ref 70–110)
POCT GLUCOSE: 195 MG/DL (ref 70–110)
POCT GLUCOSE: 196 MG/DL (ref 70–110)
POCT GLUCOSE: 198 MG/DL (ref 70–110)
POCT GLUCOSE: 198 MG/DL (ref 70–110)
POCT GLUCOSE: 201 MG/DL (ref 70–110)
POCT GLUCOSE: 205 MG/DL (ref 70–110)
POCT GLUCOSE: 206 MG/DL (ref 70–110)
POCT GLUCOSE: 211 MG/DL (ref 70–110)
POCT GLUCOSE: 212 MG/DL (ref 70–110)
POCT GLUCOSE: 213 MG/DL (ref 70–110)
POCT GLUCOSE: 213 MG/DL (ref 70–110)
POCT GLUCOSE: 214 MG/DL (ref 70–110)
POCT GLUCOSE: 215 MG/DL (ref 70–110)
POCT GLUCOSE: 216 MG/DL (ref 70–110)
POCT GLUCOSE: 219 MG/DL (ref 70–110)
POCT GLUCOSE: 219 MG/DL (ref 70–110)
POCT GLUCOSE: 220 MG/DL (ref 70–110)
POCT GLUCOSE: 221 MG/DL (ref 70–110)
POCT GLUCOSE: 222 MG/DL (ref 70–110)
POCT GLUCOSE: 223 MG/DL (ref 70–110)
POCT GLUCOSE: 224 MG/DL (ref 70–110)
POCT GLUCOSE: 224 MG/DL (ref 70–110)
POCT GLUCOSE: 225 MG/DL (ref 70–110)
POCT GLUCOSE: 228 MG/DL (ref 70–110)
POCT GLUCOSE: 229 MG/DL (ref 70–110)
POCT GLUCOSE: 231 MG/DL (ref 70–110)
POCT GLUCOSE: 236 MG/DL (ref 70–110)
POCT GLUCOSE: 238 MG/DL (ref 70–110)
POCT GLUCOSE: 240 MG/DL (ref 70–110)
POCT GLUCOSE: 240 MG/DL (ref 70–110)
POCT GLUCOSE: 244 MG/DL (ref 70–110)
POCT GLUCOSE: 247 MG/DL (ref 70–110)
POCT GLUCOSE: 249 MG/DL (ref 70–110)
POCT GLUCOSE: 249 MG/DL (ref 70–110)
POCT GLUCOSE: 251 MG/DL (ref 70–110)
POCT GLUCOSE: 253 MG/DL (ref 70–110)
POCT GLUCOSE: 258 MG/DL (ref 70–110)
POCT GLUCOSE: 261 MG/DL (ref 70–110)
POCT GLUCOSE: 263 MG/DL (ref 70–110)
POCT GLUCOSE: 263 MG/DL (ref 70–110)
POCT GLUCOSE: 265 MG/DL (ref 70–110)
POCT GLUCOSE: 265 MG/DL (ref 70–110)
POCT GLUCOSE: 266 MG/DL (ref 70–110)
POCT GLUCOSE: 267 MG/DL (ref 70–110)
POCT GLUCOSE: 268 MG/DL (ref 70–110)
POCT GLUCOSE: 275 MG/DL (ref 70–110)
POCT GLUCOSE: 282 MG/DL (ref 70–110)
POCT GLUCOSE: 282 MG/DL (ref 70–110)
POCT GLUCOSE: 292 MG/DL (ref 70–110)
POCT GLUCOSE: 297 MG/DL (ref 70–110)
POCT GLUCOSE: 307 MG/DL (ref 70–110)
POCT GLUCOSE: 307 MG/DL (ref 70–110)
POCT GLUCOSE: 310 MG/DL (ref 70–110)
POCT GLUCOSE: 311 MG/DL (ref 70–110)
POCT GLUCOSE: 319 MG/DL (ref 70–110)
POCT GLUCOSE: 334 MG/DL (ref 70–110)
POCT GLUCOSE: 362 MG/DL (ref 70–110)
POCT GLUCOSE: 77 MG/DL (ref 70–110)
POCT GLUCOSE: 88 MG/DL (ref 70–110)
POCT GLUCOSE: 89 MG/DL (ref 70–110)
POCT GLUCOSE: 91 MG/DL (ref 70–110)
POCT GLUCOSE: 93 MG/DL (ref 70–110)
POCT GLUCOSE: 94 MG/DL (ref 70–110)
POCT GLUCOSE: 95 MG/DL (ref 70–110)
POCT GLUCOSE: 96 MG/DL (ref 70–110)
POCT GLUCOSE: 96 MG/DL (ref 70–110)
POCT GLUCOSE: 98 MG/DL (ref 70–110)
POCT GLUCOSE: 99 MG/DL (ref 70–110)
POIKILOCYTOSIS BLD QL SMEAR: SLIGHT
POLYCHROMASIA BLD QL SMEAR: ABNORMAL
POTASSIUM BLD-SCNC: 4.7 MMOL/L (ref 3.5–5.1)
POTASSIUM BLD-SCNC: 5.9 MMOL/L (ref 3.5–5.1)
POTASSIUM SERPL-SCNC: 3.3 MMOL/L (ref 3.5–5.1)
POTASSIUM SERPL-SCNC: 3.3 MMOL/L (ref 3.5–5.1)
POTASSIUM SERPL-SCNC: 3.4 MMOL/L (ref 3.5–5.1)
POTASSIUM SERPL-SCNC: 3.4 MMOL/L (ref 3.5–5.1)
POTASSIUM SERPL-SCNC: 3.5 MMOL/L (ref 3.5–5.1)
POTASSIUM SERPL-SCNC: 3.7 MMOL/L (ref 3.5–5.1)
POTASSIUM SERPL-SCNC: 3.8 MMOL/L (ref 3.5–5.1)
POTASSIUM SERPL-SCNC: 3.9 MMOL/L (ref 3.5–5.1)
POTASSIUM SERPL-SCNC: 4 MMOL/L (ref 3.5–5.1)
POTASSIUM SERPL-SCNC: 4.2 MMOL/L (ref 3.5–5.1)
POTASSIUM SERPL-SCNC: 4.2 MMOL/L (ref 3.5–5.1)
POTASSIUM SERPL-SCNC: 4.3 MMOL/L (ref 3.5–5.1)
POTASSIUM SERPL-SCNC: 4.4 MMOL/L (ref 3.5–5.1)
POTASSIUM SERPL-SCNC: 4.4 MMOL/L (ref 3.5–5.1)
POTASSIUM SERPL-SCNC: 4.5 MMOL/L (ref 3.5–5.1)
POTASSIUM SERPL-SCNC: 4.6 MMOL/L (ref 3.5–5.1)
POTASSIUM SERPL-SCNC: 4.7 MMOL/L (ref 3.5–5.1)
POTASSIUM SERPL-SCNC: 4.7 MMOL/L (ref 3.5–5.1)
POTASSIUM SERPL-SCNC: 4.8 MMOL/L (ref 3.5–5.1)
POTASSIUM SERPL-SCNC: 4.8 MMOL/L (ref 3.5–5.1)
POTASSIUM SERPL-SCNC: 5 MMOL/L (ref 3.5–5.1)
POTASSIUM SERPL-SCNC: 5 MMOL/L (ref 3.5–5.1)
POTASSIUM SERPL-SCNC: 5.1 MMOL/L (ref 3.5–5.1)
POTASSIUM SERPL-SCNC: 5.2 MMOL/L (ref 3.5–5.1)
POTASSIUM SERPL-SCNC: 5.9 MMOL/L (ref 3.5–5.1)
PROCALCITONIN SERPL IA-MCNC: 0.23 NG/ML
PROCALCITONIN SERPL IA-MCNC: 0.4 NG/ML
PROMYELOCYTES NFR BLD MANUAL: 1 %
PROT SERPL-MCNC: 5 G/DL (ref 6–8.4)
PROT SERPL-MCNC: 5.1 G/DL (ref 6–8.4)
PROT SERPL-MCNC: 5.2 G/DL (ref 6–8.4)
PROT SERPL-MCNC: 5.3 G/DL (ref 6–8.4)
PROT SERPL-MCNC: 5.4 G/DL (ref 6–8.4)
PROT SERPL-MCNC: 5.5 G/DL (ref 6–8.4)
PROT SERPL-MCNC: 5.6 G/DL (ref 6–8.4)
PROT SERPL-MCNC: 5.6 G/DL (ref 6–8.4)
PROT SERPL-MCNC: 5.7 G/DL (ref 6–8.4)
PROT SERPL-MCNC: 5.8 G/DL (ref 6–8.4)
PROT SERPL-MCNC: 5.9 G/DL (ref 6–8.4)
PROT SERPL-MCNC: 6.1 G/DL (ref 6–8.4)
PROT SERPL-MCNC: 6.1 G/DL (ref 6–8.4)
PROT SERPL-MCNC: 6.4 G/DL (ref 6–8.4)
PROT SERPL-MCNC: 6.4 G/DL (ref 6–8.4)
PROT SERPL-MCNC: 6.5 G/DL (ref 6–8.4)
PROT SERPL-MCNC: 6.5 G/DL (ref 6–8.4)
PROT SERPL-MCNC: 6.8 G/DL (ref 6–8.4)
PROT SERPL-MCNC: 6.9 G/DL (ref 6–8.4)
PROT SERPL-MCNC: 7 G/DL (ref 6–8.4)
PROT SERPL-MCNC: 7.1 G/DL (ref 6–8.4)
PROT SERPL-MCNC: 7.1 G/DL (ref 6–8.4)
PROT SERPL-MCNC: 7.3 G/DL (ref 6–8.4)
PROT SERPL-MCNC: 7.6 G/DL (ref 6–8.4)
PROT UR QL STRIP: ABNORMAL
PROT UR QL STRIP: ABNORMAL
PROT UR QL STRIP: NEGATIVE
PROTHROMBIN TIME: 11.1 SEC (ref 9–12.5)
PROTHROMBIN TIME: 11.3 SEC (ref 9–12.5)
PULM VEIN S/D RATIO: 1.73
PV PEAK D VEL: 0.33 M/S
PV PEAK S VEL: 0.57 M/S
RA MAJOR: 5.57 CM
RA MAJOR: 6.47 CM
RA PRESSURE: 15 MMHG
RA PRESSURE: 3 MMHG
RA WIDTH: 3.24 CM
RA WIDTH: 4.56 CM
RBC # BLD AUTO: 1.65 M/UL (ref 4–5.4)
RBC # BLD AUTO: 1.82 M/UL (ref 4–5.4)
RBC # BLD AUTO: 1.83 M/UL (ref 4–5.4)
RBC # BLD AUTO: 1.91 M/UL (ref 4–5.4)
RBC # BLD AUTO: 2.16 M/UL (ref 4–5.4)
RBC # BLD AUTO: 2.17 M/UL (ref 4–5.4)
RBC # BLD AUTO: 2.17 M/UL (ref 4–5.4)
RBC # BLD AUTO: 2.22 M/UL (ref 4–5.4)
RBC # BLD AUTO: 2.24 M/UL (ref 4–5.4)
RBC # BLD AUTO: 2.26 M/UL (ref 4–5.4)
RBC # BLD AUTO: 2.29 M/UL (ref 4–5.4)
RBC # BLD AUTO: 2.33 M/UL (ref 4–5.4)
RBC # BLD AUTO: 2.34 M/UL (ref 4–5.4)
RBC # BLD AUTO: 2.35 M/UL (ref 4–5.4)
RBC # BLD AUTO: 2.39 M/UL (ref 4–5.4)
RBC # BLD AUTO: 2.4 M/UL (ref 4–5.4)
RBC # BLD AUTO: 2.42 M/UL (ref 4–5.4)
RBC # BLD AUTO: 2.43 M/UL (ref 4–5.4)
RBC # BLD AUTO: 2.43 M/UL (ref 4–5.4)
RBC # BLD AUTO: 2.44 M/UL (ref 4–5.4)
RBC # BLD AUTO: 2.46 M/UL (ref 4–5.4)
RBC # BLD AUTO: 2.46 M/UL (ref 4–5.4)
RBC # BLD AUTO: 2.51 M/UL (ref 4–5.4)
RBC # BLD AUTO: 2.53 M/UL (ref 4–5.4)
RBC # BLD AUTO: 2.53 M/UL (ref 4–5.4)
RBC # BLD AUTO: 2.57 M/UL (ref 4–5.4)
RBC # BLD AUTO: 2.59 M/UL (ref 4–5.4)
RBC # BLD AUTO: 2.64 M/UL (ref 4–5.4)
RBC # BLD AUTO: 2.64 M/UL (ref 4–5.4)
RBC # BLD AUTO: 2.68 M/UL (ref 4–5.4)
RBC # BLD AUTO: 2.68 M/UL (ref 4–5.4)
RBC # BLD AUTO: 2.69 M/UL (ref 4–5.4)
RBC # BLD AUTO: 2.7 M/UL (ref 4–5.4)
RBC # BLD AUTO: 2.72 M/UL (ref 4–5.4)
RBC # BLD AUTO: 2.75 M/UL (ref 4–5.4)
RBC # BLD AUTO: 2.75 M/UL (ref 4–5.4)
RBC # BLD AUTO: 2.8 M/UL (ref 4–5.4)
RBC # BLD AUTO: 2.8 M/UL (ref 4–5.4)
RBC # BLD AUTO: 2.83 M/UL (ref 4–5.4)
RBC # BLD AUTO: 2.85 M/UL (ref 4–5.4)
RBC # BLD AUTO: 2.88 M/UL (ref 4–5.4)
RBC # BLD AUTO: 2.94 M/UL (ref 4–5.4)
RBC # BLD AUTO: 3.1 M/UL (ref 4–5.4)
RBC # BLD AUTO: 3.58 M/UL (ref 4–5.4)
RBC #/AREA URNS AUTO: 0 /HPF (ref 0–4)
RBC #/AREA URNS AUTO: 1 /HPF (ref 0–4)
RIGHT VENTRICULAR END-DIASTOLIC DIMENSION: 3.19 CM
RIGHT VENTRICULAR END-DIASTOLIC DIMENSION: 3.47 CM
SAMPLE: ABNORMAL
SARS-COV-2 RDRP RESP QL NAA+PROBE: NEGATIVE
SARS-COV-2 RNA RESP QL NAA+PROBE: NOT DETECTED
SARS-COV-2 RNA RESP QL NAA+PROBE: NOT DETECTED
SCHISTOCYTES BLD QL SMEAR: ABNORMAL
SINUS: 2.88 CM
SINUS: 3.08 CM
SITE: ABNORMAL
SMUDGE CELLS BLD QL SMEAR: PRESENT
SODIUM BLD-SCNC: 137 MMOL/L (ref 136–145)
SODIUM BLD-SCNC: 137 MMOL/L (ref 136–145)
SODIUM SERPL-SCNC: 126 MMOL/L (ref 136–145)
SODIUM SERPL-SCNC: 128 MMOL/L (ref 136–145)
SODIUM SERPL-SCNC: 128 MMOL/L (ref 136–145)
SODIUM SERPL-SCNC: 129 MMOL/L (ref 136–145)
SODIUM SERPL-SCNC: 130 MMOL/L (ref 136–145)
SODIUM SERPL-SCNC: 132 MMOL/L (ref 136–145)
SODIUM SERPL-SCNC: 133 MMOL/L (ref 136–145)
SODIUM SERPL-SCNC: 134 MMOL/L (ref 136–145)
SODIUM SERPL-SCNC: 135 MMOL/L (ref 136–145)
SODIUM SERPL-SCNC: 136 MMOL/L (ref 136–145)
SODIUM SERPL-SCNC: 137 MMOL/L (ref 136–145)
SODIUM SERPL-SCNC: 138 MMOL/L (ref 136–145)
SODIUM SERPL-SCNC: 139 MMOL/L (ref 136–145)
SODIUM SERPL-SCNC: 139 MMOL/L (ref 136–145)
SODIUM SERPL-SCNC: 140 MMOL/L (ref 136–145)
SODIUM SERPL-SCNC: 140 MMOL/L (ref 136–145)
SODIUM SERPL-SCNC: 141 MMOL/L (ref 136–145)
SODIUM SERPL-SCNC: 142 MMOL/L (ref 136–145)
SODIUM SERPL-SCNC: 143 MMOL/L (ref 136–145)
SODIUM SERPL-SCNC: 143 MMOL/L (ref 136–145)
SODIUM SERPL-SCNC: 145 MMOL/L (ref 136–145)
SODIUM UR-SCNC: 57 MMOL/L (ref 20–250)
SP GR UR STRIP: 1.01 (ref 1–1.03)
SP02: 93
SPHEROCYTES BLD QL SMEAR: ABNORMAL
SQUAMOUS #/AREA URNS AUTO: 1 /HPF
STJ: 2.51 CM
STJ: 3.02 CM
TARGETS BLD QL SMEAR: ABNORMAL
TB INDURATION 48 - 72 HR READ: 0 MM
TDI LATERAL: 0.08 M/S
TDI LATERAL: 0.09 M/S
TDI SEPTAL: 0.06 M/S
TDI SEPTAL: 0.08 M/S
TDI: 0.08 M/S
TDI: 0.08 M/S
TRICUSPID ANNULAR PLANE SYSTOLIC EXCURSION: 2.06 CM
TRICUSPID ANNULAR PLANE SYSTOLIC EXCURSION: 2.13 CM
TRIGL SERPL-MCNC: 212 MG/DL (ref 30–150)
TROPONIN I SERPL DL<=0.01 NG/ML-MCNC: 0.01 NG/ML (ref 0–0.03)
TROPONIN I SERPL DL<=0.01 NG/ML-MCNC: 0.01 NG/ML (ref 0–0.03)
TSH SERPL DL<=0.005 MIU/L-ACNC: 3.24 UIU/ML (ref 0.4–4)
URATE SERPL-MCNC: 4.3 MG/DL (ref 2.4–5.7)
URN SPEC COLLECT METH UR: ABNORMAL
URN SPEC COLLECT METH UR: ABNORMAL
URN SPEC COLLECT METH UR: NORMAL
WBC # BLD AUTO: 1.83 K/UL (ref 3.9–12.7)
WBC # BLD AUTO: 1.98 K/UL (ref 3.9–12.7)
WBC # BLD AUTO: 10.33 K/UL (ref 3.9–12.7)
WBC # BLD AUTO: 10.55 K/UL (ref 3.9–12.7)
WBC # BLD AUTO: 18.23 K/UL (ref 3.9–12.7)
WBC # BLD AUTO: 2.05 K/UL (ref 3.9–12.7)
WBC # BLD AUTO: 2.05 K/UL (ref 3.9–12.7)
WBC # BLD AUTO: 2.12 K/UL (ref 3.9–12.7)
WBC # BLD AUTO: 2.18 K/UL (ref 3.9–12.7)
WBC # BLD AUTO: 2.19 K/UL (ref 3.9–12.7)
WBC # BLD AUTO: 2.2 K/UL (ref 3.9–12.7)
WBC # BLD AUTO: 2.26 K/UL (ref 3.9–12.7)
WBC # BLD AUTO: 2.34 K/UL (ref 3.9–12.7)
WBC # BLD AUTO: 2.47 K/UL (ref 3.9–12.7)
WBC # BLD AUTO: 2.49 K/UL (ref 3.9–12.7)
WBC # BLD AUTO: 2.51 K/UL (ref 3.9–12.7)
WBC # BLD AUTO: 2.58 K/UL (ref 3.9–12.7)
WBC # BLD AUTO: 2.63 K/UL (ref 3.9–12.7)
WBC # BLD AUTO: 2.84 K/UL (ref 3.9–12.7)
WBC # BLD AUTO: 2.94 K/UL (ref 3.9–12.7)
WBC # BLD AUTO: 3.39 K/UL (ref 3.9–12.7)
WBC # BLD AUTO: 3.64 K/UL (ref 3.9–12.7)
WBC # BLD AUTO: 3.81 K/UL (ref 3.9–12.7)
WBC # BLD AUTO: 3.82 K/UL (ref 3.9–12.7)
WBC # BLD AUTO: 4.75 K/UL (ref 3.9–12.7)
WBC # BLD AUTO: 5.57 K/UL (ref 3.9–12.7)
WBC # BLD AUTO: 5.67 K/UL (ref 3.9–12.7)
WBC # BLD AUTO: 5.7 K/UL (ref 3.9–12.7)
WBC # BLD AUTO: 5.87 K/UL (ref 3.9–12.7)
WBC # BLD AUTO: 6.01 K/UL (ref 3.9–12.7)
WBC # BLD AUTO: 6.04 K/UL (ref 3.9–12.7)
WBC # BLD AUTO: 6.19 K/UL (ref 3.9–12.7)
WBC # BLD AUTO: 6.55 K/UL (ref 3.9–12.7)
WBC # BLD AUTO: 6.61 K/UL (ref 3.9–12.7)
WBC # BLD AUTO: 6.82 K/UL (ref 3.9–12.7)
WBC # BLD AUTO: 6.9 K/UL (ref 3.9–12.7)
WBC # BLD AUTO: 7.12 K/UL (ref 3.9–12.7)
WBC # BLD AUTO: 7.2 K/UL (ref 3.9–12.7)
WBC # BLD AUTO: 7.4 K/UL (ref 3.9–12.7)
WBC # BLD AUTO: 7.55 K/UL (ref 3.9–12.7)
WBC # BLD AUTO: 7.57 K/UL (ref 3.9–12.7)
WBC # BLD AUTO: 7.62 K/UL (ref 3.9–12.7)
WBC # BLD AUTO: 7.65 K/UL (ref 3.9–12.7)
WBC # BLD AUTO: 7.96 K/UL (ref 3.9–12.7)
WBC # BLD AUTO: 7.99 K/UL (ref 3.9–12.7)
WBC # BLD AUTO: 8.09 K/UL (ref 3.9–12.7)
WBC # BLD AUTO: 8.18 K/UL (ref 3.9–12.7)
WBC # BLD AUTO: 9.29 K/UL (ref 3.9–12.7)
WBC # BLD AUTO: 9.34 K/UL (ref 3.9–12.7)
WBC # BLD AUTO: 9.76 K/UL (ref 3.9–12.7)
WBC # FLD: 300 /CU MM
WBC #/AREA URNS AUTO: 0 /HPF (ref 0–5)
WBC #/AREA URNS AUTO: 1 /HPF (ref 0–5)
WBC OTHER NFR BLD MANUAL: 2 %
WBC OTHER NFR BLD MANUAL: 3 %

## 2022-01-01 PROCEDURE — 99215 PR OFFICE/OUTPT VISIT, EST, LEVL V, 40-54 MIN: ICD-10-PCS | Mod: S$GLB,,, | Performed by: INTERNAL MEDICINE

## 2022-01-01 PROCEDURE — 84100 ASSAY OF PHOSPHORUS: CPT | Performed by: NURSE PRACTITIONER

## 2022-01-01 PROCEDURE — 25000003 PHARM REV CODE 250: Performed by: PHYSICIAN ASSISTANT

## 2022-01-01 PROCEDURE — 97116 GAIT TRAINING THERAPY: CPT | Mod: CQ

## 2022-01-01 PROCEDURE — 36430 TRANSFUSION BLD/BLD COMPNT: CPT

## 2022-01-01 PROCEDURE — P9040 RBC LEUKOREDUCED IRRADIATED: HCPCS | Performed by: STUDENT IN AN ORGANIZED HEALTH CARE EDUCATION/TRAINING PROGRAM

## 2022-01-01 PROCEDURE — 25000003 PHARM REV CODE 250: Performed by: STUDENT IN AN ORGANIZED HEALTH CARE EDUCATION/TRAINING PROGRAM

## 2022-01-01 PROCEDURE — 86922 COMPATIBILITY TEST ANTIGLOB: CPT | Performed by: INTERNAL MEDICINE

## 2022-01-01 PROCEDURE — 99291 PR CRITICAL CARE, E/M 30-74 MINUTES: ICD-10-PCS | Mod: GC,,, | Performed by: EMERGENCY MEDICINE

## 2022-01-01 PROCEDURE — 97530 THERAPEUTIC ACTIVITIES: CPT

## 2022-01-01 PROCEDURE — 1125F AMNT PAIN NOTED PAIN PRSNT: CPT | Mod: CPTII,S$GLB,, | Performed by: NURSE PRACTITIONER

## 2022-01-01 PROCEDURE — 99284 PR EMERGENCY DEPT VISIT,LEVEL IV: ICD-10-PCS | Mod: ,,, | Performed by: EMERGENCY MEDICINE

## 2022-01-01 PROCEDURE — 99233 PR SUBSEQUENT HOSPITAL CARE,LEVL III: ICD-10-PCS | Mod: ,,, | Performed by: INTERNAL MEDICINE

## 2022-01-01 PROCEDURE — 27000221 HC OXYGEN, UP TO 24 HOURS

## 2022-01-01 PROCEDURE — A4216 STERILE WATER/SALINE, 10 ML: HCPCS | Performed by: INTERNAL MEDICINE

## 2022-01-01 PROCEDURE — 25000003 PHARM REV CODE 250: Performed by: HOSPITALIST

## 2022-01-01 PROCEDURE — 88312 SPECIAL STAINS GROUP 1: CPT | Performed by: PATHOLOGY

## 2022-01-01 PROCEDURE — 85610 PROTHROMBIN TIME: CPT

## 2022-01-01 PROCEDURE — 25000003 PHARM REV CODE 250: Performed by: NURSE PRACTITIONER

## 2022-01-01 PROCEDURE — 97535 SELF CARE MNGMENT TRAINING: CPT

## 2022-01-01 PROCEDURE — 85027 COMPLETE CBC AUTOMATED: CPT | Mod: 91 | Performed by: STUDENT IN AN ORGANIZED HEALTH CARE EDUCATION/TRAINING PROGRAM

## 2022-01-01 PROCEDURE — 85025 COMPLETE CBC W/AUTO DIFF WBC: CPT | Performed by: INTERNAL MEDICINE

## 2022-01-01 PROCEDURE — U0005 INFEC AGEN DETEC AMPLI PROBE: HCPCS | Performed by: PHYSICIAN ASSISTANT

## 2022-01-01 PROCEDURE — 94640 AIRWAY INHALATION TREATMENT: CPT

## 2022-01-01 PROCEDURE — 1157F PR ADVANCE CARE PLAN OR EQUIV PRESENT IN MEDICAL RECORD: ICD-10-PCS | Mod: CPTII,S$GLB,, | Performed by: PHYSICIAN ASSISTANT

## 2022-01-01 PROCEDURE — 1160F RVW MEDS BY RX/DR IN RCRD: CPT | Mod: CPTII,S$GLB,, | Performed by: INTERNAL MEDICINE

## 2022-01-01 PROCEDURE — 3008F PR BODY MASS INDEX (BMI) DOCUMENTED: ICD-10-PCS | Mod: CPTII,S$GLB,, | Performed by: INTERNAL MEDICINE

## 2022-01-01 PROCEDURE — 25000003 PHARM REV CODE 250: Performed by: INTERNAL MEDICINE

## 2022-01-01 PROCEDURE — 63600175 PHARM REV CODE 636 W HCPCS: Performed by: INTERNAL MEDICINE

## 2022-01-01 PROCEDURE — 31622 DX BRONCHOSCOPE/WASH: CPT | Mod: ,,, | Performed by: INTERNAL MEDICINE

## 2022-01-01 PROCEDURE — 1157F ADVNC CARE PLAN IN RCRD: CPT | Mod: CPTII,S$GLB,, | Performed by: INTERNAL MEDICINE

## 2022-01-01 PROCEDURE — 85007 BL SMEAR W/DIFF WBC COUNT: CPT | Performed by: NURSE PRACTITIONER

## 2022-01-01 PROCEDURE — 83735 ASSAY OF MAGNESIUM: CPT | Performed by: NURSE PRACTITIONER

## 2022-01-01 PROCEDURE — 63600175 PHARM REV CODE 636 W HCPCS: Performed by: PHYSICIAN ASSISTANT

## 2022-01-01 PROCEDURE — 63600175 PHARM REV CODE 636 W HCPCS: Performed by: HOSPITALIST

## 2022-01-01 PROCEDURE — 36415 COLL VENOUS BLD VENIPUNCTURE: CPT | Performed by: INTERNAL MEDICINE

## 2022-01-01 PROCEDURE — 82565 ASSAY OF CREATININE: CPT

## 2022-01-01 PROCEDURE — 99292 CRITICAL CARE ADDL 30 MIN: CPT

## 2022-01-01 PROCEDURE — P9040 RBC LEUKOREDUCED IRRADIATED: HCPCS | Performed by: NURSE PRACTITIONER

## 2022-01-01 PROCEDURE — 4010F PR ACE/ARB THEARPY RXD/TAKEN: ICD-10-PCS | Mod: CPTII,S$GLB,, | Performed by: INTERNAL MEDICINE

## 2022-01-01 PROCEDURE — 31622 PR BRONCHOSCOPY,DIAGNOSTIC: ICD-10-PCS | Mod: ,,, | Performed by: INTERNAL MEDICINE

## 2022-01-01 PROCEDURE — 85027 COMPLETE CBC AUTOMATED: CPT | Performed by: EMERGENCY MEDICINE

## 2022-01-01 PROCEDURE — 86902 BLOOD TYPE ANTIGEN DONOR EA: CPT

## 2022-01-01 PROCEDURE — 20600001 HC STEP DOWN PRIVATE ROOM

## 2022-01-01 PROCEDURE — 86850 RBC ANTIBODY SCREEN: CPT | Performed by: INTERNAL MEDICINE

## 2022-01-01 PROCEDURE — 96360 HYDRATION IV INFUSION INIT: CPT | Mod: 59

## 2022-01-01 PROCEDURE — 84300 ASSAY OF URINE SODIUM: CPT | Performed by: PHYSICIAN ASSISTANT

## 2022-01-01 PROCEDURE — 84100 ASSAY OF PHOSPHORUS: CPT | Performed by: STUDENT IN AN ORGANIZED HEALTH CARE EDUCATION/TRAINING PROGRAM

## 2022-01-01 PROCEDURE — 99223 1ST HOSP IP/OBS HIGH 75: CPT | Mod: ,,, | Performed by: PSYCHIATRY & NEUROLOGY

## 2022-01-01 PROCEDURE — 93005 ELECTROCARDIOGRAM TRACING: CPT

## 2022-01-01 PROCEDURE — 1160F PR REVIEW ALL MEDS BY PRESCRIBER/CLIN PHARMACIST DOCUMENTED: ICD-10-PCS | Mod: CPTII,S$GLB,, | Performed by: INTERNAL MEDICINE

## 2022-01-01 PROCEDURE — 1159F PR MEDICATION LIST DOCUMENTED IN MEDICAL RECORD: ICD-10-PCS | Mod: CPTII,S$GLB,, | Performed by: NURSE PRACTITIONER

## 2022-01-01 PROCEDURE — 85027 COMPLETE CBC AUTOMATED: CPT | Performed by: STUDENT IN AN ORGANIZED HEALTH CARE EDUCATION/TRAINING PROGRAM

## 2022-01-01 PROCEDURE — P9040 RBC LEUKOREDUCED IRRADIATED: HCPCS | Performed by: INTERNAL MEDICINE

## 2022-01-01 PROCEDURE — 82962 GLUCOSE BLOOD TEST: CPT

## 2022-01-01 PROCEDURE — 96401 CHEMO ANTI-NEOPL SQ/IM: CPT

## 2022-01-01 PROCEDURE — 85027 COMPLETE CBC AUTOMATED: CPT | Performed by: NURSE PRACTITIONER

## 2022-01-01 PROCEDURE — 97530 THERAPEUTIC ACTIVITIES: CPT | Mod: CQ

## 2022-01-01 PROCEDURE — 1159F PR MEDICATION LIST DOCUMENTED IN MEDICAL RECORD: ICD-10-PCS | Mod: CPTII,S$GLB,, | Performed by: INTERNAL MEDICINE

## 2022-01-01 PROCEDURE — 1101F PT FALLS ASSESS-DOCD LE1/YR: CPT | Mod: CPTII,S$GLB,, | Performed by: NURSE PRACTITIONER

## 2022-01-01 PROCEDURE — 1101F PT FALLS ASSESS-DOCD LE1/YR: CPT | Mod: CPTII,S$GLB,, | Performed by: PHYSICIAN ASSISTANT

## 2022-01-01 PROCEDURE — 97110 THERAPEUTIC EXERCISES: CPT

## 2022-01-01 PROCEDURE — 11000004 HC SNF PRIVATE

## 2022-01-01 PROCEDURE — 25000003 PHARM REV CODE 250: Performed by: EMERGENCY MEDICINE

## 2022-01-01 PROCEDURE — 99214 PR OFFICE/OUTPT VISIT, EST, LEVL IV, 30-39 MIN: ICD-10-PCS | Mod: S$GLB,,, | Performed by: INTERNAL MEDICINE

## 2022-01-01 PROCEDURE — 18500000 HC LEAVE OF ABSENCE HOSPITAL SERVICES

## 2022-01-01 PROCEDURE — 86901 BLOOD TYPING SEROLOGIC RH(D): CPT | Performed by: PHYSICIAN ASSISTANT

## 2022-01-01 PROCEDURE — P9040 RBC LEUKOREDUCED IRRADIATED: HCPCS

## 2022-01-01 PROCEDURE — 99233 SBSQ HOSP IP/OBS HIGH 50: CPT | Mod: ,,, | Performed by: INTERNAL MEDICINE

## 2022-01-01 PROCEDURE — 86900 BLOOD TYPING SEROLOGIC ABO: CPT | Performed by: NURSE PRACTITIONER

## 2022-01-01 PROCEDURE — 80053 COMPREHEN METABOLIC PANEL: CPT | Performed by: NURSE PRACTITIONER

## 2022-01-01 PROCEDURE — 99900035 HC TECH TIME PER 15 MIN (STAT)

## 2022-01-01 PROCEDURE — 85027 COMPLETE CBC AUTOMATED: CPT

## 2022-01-01 PROCEDURE — G0378 HOSPITAL OBSERVATION PER HR: HCPCS

## 2022-01-01 PROCEDURE — 1159F MED LIST DOCD IN RCRD: CPT | Mod: CPTII,S$GLB,, | Performed by: INTERNAL MEDICINE

## 2022-01-01 PROCEDURE — 83735 ASSAY OF MAGNESIUM: CPT | Performed by: HOSPITALIST

## 2022-01-01 PROCEDURE — 36415 COLL VENOUS BLD VENIPUNCTURE: CPT | Performed by: NURSE PRACTITIONER

## 2022-01-01 PROCEDURE — 86850 RBC ANTIBODY SCREEN: CPT | Performed by: STUDENT IN AN ORGANIZED HEALTH CARE EDUCATION/TRAINING PROGRAM

## 2022-01-01 PROCEDURE — 86902 BLOOD TYPE ANTIGEN DONOR EA: CPT | Performed by: INTERNAL MEDICINE

## 2022-01-01 PROCEDURE — 86922 COMPATIBILITY TEST ANTIGLOB: CPT | Performed by: NURSE PRACTITIONER

## 2022-01-01 PROCEDURE — 3078F PR MOST RECENT DIASTOLIC BLOOD PRESSURE < 80 MM HG: ICD-10-PCS | Mod: CPTII,S$GLB,, | Performed by: PHYSICIAN ASSISTANT

## 2022-01-01 PROCEDURE — 80053 COMPREHEN METABOLIC PANEL: CPT | Performed by: STUDENT IN AN ORGANIZED HEALTH CARE EDUCATION/TRAINING PROGRAM

## 2022-01-01 PROCEDURE — 97116 GAIT TRAINING THERAPY: CPT

## 2022-01-01 PROCEDURE — 97110 THERAPEUTIC EXERCISES: CPT | Mod: CQ

## 2022-01-01 PROCEDURE — 96372 THER/PROPH/DIAG INJ SC/IM: CPT | Performed by: INTERNAL MEDICINE

## 2022-01-01 PROCEDURE — 96366 THER/PROPH/DIAG IV INF ADDON: CPT

## 2022-01-01 PROCEDURE — 90834 PR PSYCHOTHERAPY W/PATIENT, 45 MIN: ICD-10-PCS | Mod: ,,, | Performed by: PSYCHOLOGIST

## 2022-01-01 PROCEDURE — 30200315 PPD INTRADERMAL TEST REV CODE 302: Performed by: NURSE PRACTITIONER

## 2022-01-01 PROCEDURE — 87206 SMEAR FLUORESCENT/ACID STAI: CPT | Mod: 91 | Performed by: EMERGENCY MEDICINE

## 2022-01-01 PROCEDURE — G0180 MD CERTIFICATION HHA PATIENT: HCPCS | Mod: ,,, | Performed by: INTERNAL MEDICINE

## 2022-01-01 PROCEDURE — 25000242 PHARM REV CODE 250 ALT 637 W/ HCPCS: Performed by: INTERNAL MEDICINE

## 2022-01-01 PROCEDURE — 86922 COMPATIBILITY TEST ANTIGLOB: CPT | Mod: 59 | Performed by: INTERNAL MEDICINE

## 2022-01-01 PROCEDURE — 97535 SELF CARE MNGMENT TRAINING: CPT | Mod: CO

## 2022-01-01 PROCEDURE — 1111F DSCHRG MED/CURRENT MED MERGE: CPT | Mod: CPTII,S$GLB,, | Performed by: INTERNAL MEDICINE

## 2022-01-01 PROCEDURE — 87040 BLOOD CULTURE FOR BACTERIA: CPT | Mod: 59 | Performed by: STUDENT IN AN ORGANIZED HEALTH CARE EDUCATION/TRAINING PROGRAM

## 2022-01-01 PROCEDURE — 99285 PR EMERGENCY DEPT VISIT,LEVEL V: ICD-10-PCS | Mod: CS,,, | Performed by: PHYSICIAN ASSISTANT

## 2022-01-01 PROCEDURE — 96372 THER/PROPH/DIAG INJ SC/IM: CPT | Performed by: NURSE PRACTITIONER

## 2022-01-01 PROCEDURE — 99152 MOD SED SAME PHYS/QHP 5/>YRS: CPT | Performed by: INTERNAL MEDICINE

## 2022-01-01 PROCEDURE — 89051 BODY FLUID CELL COUNT: CPT | Performed by: EMERGENCY MEDICINE

## 2022-01-01 PROCEDURE — 97112 NEUROMUSCULAR REEDUCATION: CPT

## 2022-01-01 PROCEDURE — 94761 N-INVAS EAR/PLS OXIMETRY MLT: CPT

## 2022-01-01 PROCEDURE — 80053 COMPREHEN METABOLIC PANEL: CPT | Performed by: INTERNAL MEDICINE

## 2022-01-01 PROCEDURE — U0002 COVID-19 LAB TEST NON-CDC: HCPCS | Performed by: STUDENT IN AN ORGANIZED HEALTH CARE EDUCATION/TRAINING PROGRAM

## 2022-01-01 PROCEDURE — 99215 OFFICE O/P EST HI 40 MIN: CPT | Mod: S$GLB,,, | Performed by: NURSE PRACTITIONER

## 2022-01-01 PROCEDURE — 3074F PR MOST RECENT SYSTOLIC BLOOD PRESSURE < 130 MM HG: ICD-10-PCS | Mod: CPTII,S$GLB,, | Performed by: NURSE PRACTITIONER

## 2022-01-01 PROCEDURE — 97162 PT EVAL MOD COMPLEX 30 MIN: CPT

## 2022-01-01 PROCEDURE — 63600175 PHARM REV CODE 636 W HCPCS: Performed by: NURSE PRACTITIONER

## 2022-01-01 PROCEDURE — 97161 PT EVAL LOW COMPLEX 20 MIN: CPT

## 2022-01-01 PROCEDURE — 86850 RBC ANTIBODY SCREEN: CPT

## 2022-01-01 PROCEDURE — 88305 TISSUE EXAM BY PATHOLOGIST: CPT | Mod: 26,,, | Performed by: PATHOLOGY

## 2022-01-01 PROCEDURE — G0180 PR HOME HEALTH MD CERTIFICATION: ICD-10-PCS | Mod: ,,, | Performed by: INTERNAL MEDICINE

## 2022-01-01 PROCEDURE — 1157F PR ADVANCE CARE PLAN OR EQUIV PRESENT IN MEDICAL RECORD: ICD-10-PCS | Mod: CPTII,S$GLB,, | Performed by: INTERNAL MEDICINE

## 2022-01-01 PROCEDURE — 84145 PROCALCITONIN (PCT): CPT | Performed by: PHYSICIAN ASSISTANT

## 2022-01-01 PROCEDURE — 1160F PR REVIEW ALL MEDS BY PRESCRIBER/CLIN PHARMACIST DOCUMENTED: ICD-10-PCS | Mod: CPTII,S$GLB,, | Performed by: PHYSICIAN ASSISTANT

## 2022-01-01 PROCEDURE — 3008F BODY MASS INDEX DOCD: CPT | Mod: CPTII,S$GLB,, | Performed by: PHYSICIAN ASSISTANT

## 2022-01-01 PROCEDURE — 87040 BLOOD CULTURE FOR BACTERIA: CPT | Mod: 59 | Performed by: PHYSICIAN ASSISTANT

## 2022-01-01 PROCEDURE — 3078F PR MOST RECENT DIASTOLIC BLOOD PRESSURE < 80 MM HG: ICD-10-PCS | Mod: CPTII,S$GLB,, | Performed by: INTERNAL MEDICINE

## 2022-01-01 PROCEDURE — 63700000 PHARM REV CODE 250 ALT 637 W/O HCPCS: Performed by: STUDENT IN AN ORGANIZED HEALTH CARE EDUCATION/TRAINING PROGRAM

## 2022-01-01 PROCEDURE — 85007 BL SMEAR W/DIFF WBC COUNT: CPT | Performed by: STUDENT IN AN ORGANIZED HEALTH CARE EDUCATION/TRAINING PROGRAM

## 2022-01-01 PROCEDURE — 85027 COMPLETE CBC AUTOMATED: CPT | Performed by: INTERNAL MEDICINE

## 2022-01-01 PROCEDURE — 85060 BLOOD SMEAR INTERPRETATION: CPT | Mod: ,,, | Performed by: PATHOLOGY

## 2022-01-01 PROCEDURE — 25000242 PHARM REV CODE 250 ALT 637 W/ HCPCS: Performed by: STUDENT IN AN ORGANIZED HEALTH CARE EDUCATION/TRAINING PROGRAM

## 2022-01-01 PROCEDURE — 94799 UNLISTED PULMONARY SVC/PX: CPT

## 2022-01-01 PROCEDURE — 96375 TX/PRO/DX INJ NEW DRUG ADDON: CPT

## 2022-01-01 PROCEDURE — 97112 NEUROMUSCULAR REEDUCATION: CPT | Mod: CQ

## 2022-01-01 PROCEDURE — 85025 COMPLETE CBC W/AUTO DIFF WBC: CPT | Performed by: EMERGENCY MEDICINE

## 2022-01-01 PROCEDURE — 1159F MED LIST DOCD IN RCRD: CPT | Mod: CPTII,S$GLB,, | Performed by: NURSE PRACTITIONER

## 2022-01-01 PROCEDURE — 83735 ASSAY OF MAGNESIUM: CPT | Performed by: STUDENT IN AN ORGANIZED HEALTH CARE EDUCATION/TRAINING PROGRAM

## 2022-01-01 PROCEDURE — 99999 PR PBB SHADOW E&M-EST. PATIENT-LVL IV: ICD-10-PCS | Mod: PBBFAC,,, | Performed by: PHYSICIAN ASSISTANT

## 2022-01-01 PROCEDURE — 99220 PR INITIAL OBSERVATION CARE,LEVL III: CPT | Mod: ,,, | Performed by: INTERNAL MEDICINE

## 2022-01-01 PROCEDURE — 99499 UNLISTED E&M SERVICE: CPT | Mod: S$GLB,,, | Performed by: PHYSICIAN ASSISTANT

## 2022-01-01 PROCEDURE — 87205 SMEAR GRAM STAIN: CPT | Performed by: EMERGENCY MEDICINE

## 2022-01-01 PROCEDURE — 84100 ASSAY OF PHOSPHORUS: CPT | Performed by: PHYSICIAN ASSISTANT

## 2022-01-01 PROCEDURE — 96365 THER/PROPH/DIAG IV INF INIT: CPT

## 2022-01-01 PROCEDURE — 99292 PR CRITICAL CARE, ADDL 30 MIN: ICD-10-PCS | Mod: ,,, | Performed by: EMERGENCY MEDICINE

## 2022-01-01 PROCEDURE — 3288F PR FALLS RISK ASSESSMENT DOCUMENTED: ICD-10-PCS | Mod: CPTII,S$GLB,, | Performed by: PHYSICIAN ASSISTANT

## 2022-01-01 PROCEDURE — 99233 PR SUBSEQUENT HOSPITAL CARE,LEVL III: ICD-10-PCS | Mod: GC,,, | Performed by: INTERNAL MEDICINE

## 2022-01-01 PROCEDURE — 86850 RBC ANTIBODY SCREEN: CPT | Performed by: NURSE PRACTITIONER

## 2022-01-01 PROCEDURE — 84443 ASSAY THYROID STIM HORMONE: CPT | Performed by: STUDENT IN AN ORGANIZED HEALTH CARE EDUCATION/TRAINING PROGRAM

## 2022-01-01 PROCEDURE — 80053 COMPREHEN METABOLIC PANEL: CPT | Performed by: PHYSICIAN ASSISTANT

## 2022-01-01 PROCEDURE — 99292 CRITICAL CARE ADDL 30 MIN: CPT | Mod: ,,, | Performed by: EMERGENCY MEDICINE

## 2022-01-01 PROCEDURE — P9040 RBC LEUKOREDUCED IRRADIATED: HCPCS | Performed by: PHYSICIAN ASSISTANT

## 2022-01-01 PROCEDURE — 96361 HYDRATE IV INFUSION ADD-ON: CPT

## 2022-01-01 PROCEDURE — 63600175 PHARM REV CODE 636 W HCPCS: Performed by: STUDENT IN AN ORGANIZED HEALTH CARE EDUCATION/TRAINING PROGRAM

## 2022-01-01 PROCEDURE — 99225 PR SUBSEQUENT OBSERVATION CARE,LEVEL II: ICD-10-PCS | Mod: GC,,, | Performed by: INTERNAL MEDICINE

## 2022-01-01 PROCEDURE — 99306 1ST NF CARE HIGH MDM 50: CPT | Mod: AI,,, | Performed by: HOSPITALIST

## 2022-01-01 PROCEDURE — 3008F BODY MASS INDEX DOCD: CPT | Mod: CPTII,S$GLB,, | Performed by: NURSE PRACTITIONER

## 2022-01-01 PROCEDURE — 99284 EMERGENCY DEPT VISIT MOD MDM: CPT | Mod: ,,, | Performed by: EMERGENCY MEDICINE

## 2022-01-01 PROCEDURE — 80061 LIPID PANEL: CPT | Performed by: STUDENT IN AN ORGANIZED HEALTH CARE EDUCATION/TRAINING PROGRAM

## 2022-01-01 PROCEDURE — 1101F PT FALLS ASSESS-DOCD LE1/YR: CPT | Mod: CPTII,S$GLB,, | Performed by: INTERNAL MEDICINE

## 2022-01-01 PROCEDURE — 85007 BL SMEAR W/DIFF WBC COUNT: CPT | Mod: 91 | Performed by: NURSE PRACTITIONER

## 2022-01-01 PROCEDURE — 99225 PR SUBSEQUENT OBSERVATION CARE,LEVEL II: ICD-10-PCS | Mod: ,,, | Performed by: INTERNAL MEDICINE

## 2022-01-01 PROCEDURE — 84484 ASSAY OF TROPONIN QUANT: CPT | Performed by: EMERGENCY MEDICINE

## 2022-01-01 PROCEDURE — 99226 PR SUBSEQUENT OBSERVATION CARE,LEVEL III: CPT | Mod: ,,, | Performed by: INTERNAL MEDICINE

## 2022-01-01 PROCEDURE — 84100 ASSAY OF PHOSPHORUS: CPT | Performed by: HOSPITALIST

## 2022-01-01 PROCEDURE — 3044F HG A1C LEVEL LT 7.0%: CPT | Mod: CPTII,S$GLB,, | Performed by: INTERNAL MEDICINE

## 2022-01-01 PROCEDURE — 3074F PR MOST RECENT SYSTOLIC BLOOD PRESSURE < 130 MM HG: ICD-10-PCS | Mod: CPTII,S$GLB,, | Performed by: INTERNAL MEDICINE

## 2022-01-01 PROCEDURE — 25000003 PHARM REV CODE 250

## 2022-01-01 PROCEDURE — 85379 FIBRIN DEGRADATION QUANT: CPT | Performed by: STUDENT IN AN ORGANIZED HEALTH CARE EDUCATION/TRAINING PROGRAM

## 2022-01-01 PROCEDURE — 81001 URINALYSIS AUTO W/SCOPE: CPT | Performed by: STUDENT IN AN ORGANIZED HEALTH CARE EDUCATION/TRAINING PROGRAM

## 2022-01-01 PROCEDURE — 85007 BL SMEAR W/DIFF WBC COUNT: CPT | Performed by: EMERGENCY MEDICINE

## 2022-01-01 PROCEDURE — 3078F PR MOST RECENT DIASTOLIC BLOOD PRESSURE < 80 MM HG: ICD-10-PCS | Mod: CPTII,S$GLB,, | Performed by: NURSE PRACTITIONER

## 2022-01-01 PROCEDURE — 99215 PR OFFICE/OUTPT VISIT, EST, LEVL V, 40-54 MIN: ICD-10-PCS | Mod: S$GLB,,, | Performed by: NURSE PRACTITIONER

## 2022-01-01 PROCEDURE — 86901 BLOOD TYPING SEROLOGIC RH(D): CPT | Performed by: STUDENT IN AN ORGANIZED HEALTH CARE EDUCATION/TRAINING PROGRAM

## 2022-01-01 PROCEDURE — 99225 PR SUBSEQUENT OBSERVATION CARE,LEVEL II: CPT | Mod: GC,,, | Performed by: INTERNAL MEDICINE

## 2022-01-01 PROCEDURE — 97165 OT EVAL LOW COMPLEX 30 MIN: CPT

## 2022-01-01 PROCEDURE — 83036 HEMOGLOBIN GLYCOSYLATED A1C: CPT | Performed by: STUDENT IN AN ORGANIZED HEALTH CARE EDUCATION/TRAINING PROGRAM

## 2022-01-01 PROCEDURE — 1101F PR PT FALLS ASSESS DOC 0-1 FALLS W/OUT INJ PAST YR: ICD-10-PCS | Mod: CPTII,S$GLB,, | Performed by: INTERNAL MEDICINE

## 2022-01-01 PROCEDURE — 63600175 PHARM REV CODE 636 W HCPCS: Mod: JW,JG | Performed by: INTERNAL MEDICINE

## 2022-01-01 PROCEDURE — 63600175 PHARM REV CODE 636 W HCPCS: Mod: JG | Performed by: INTERNAL MEDICINE

## 2022-01-01 PROCEDURE — 3074F SYST BP LT 130 MM HG: CPT | Mod: CPTII,S$GLB,, | Performed by: INTERNAL MEDICINE

## 2022-01-01 PROCEDURE — 93010 EKG 12-LEAD: ICD-10-PCS | Mod: ,,, | Performed by: INTERNAL MEDICINE

## 2022-01-01 PROCEDURE — 1160F RVW MEDS BY RX/DR IN RCRD: CPT | Mod: CPTII,S$GLB,, | Performed by: PHYSICIAN ASSISTANT

## 2022-01-01 PROCEDURE — 85027 COMPLETE CBC AUTOMATED: CPT | Mod: 91 | Performed by: NURSE PRACTITIONER

## 2022-01-01 PROCEDURE — 93010 ELECTROCARDIOGRAM REPORT: CPT | Mod: ,,, | Performed by: INTERNAL MEDICINE

## 2022-01-01 PROCEDURE — U0002 COVID-19 LAB TEST NON-CDC: HCPCS | Performed by: NURSE PRACTITIONER

## 2022-01-01 PROCEDURE — 86902 BLOOD TYPE ANTIGEN DONOR EA: CPT | Performed by: PHYSICIAN ASSISTANT

## 2022-01-01 PROCEDURE — 99285 EMERGENCY DEPT VISIT HI MDM: CPT | Mod: 25,CS

## 2022-01-01 PROCEDURE — 1101F PR PT FALLS ASSESS DOC 0-1 FALLS W/OUT INJ PAST YR: ICD-10-PCS | Mod: CPTII,S$GLB,, | Performed by: PHYSICIAN ASSISTANT

## 2022-01-01 PROCEDURE — 3288F PR FALLS RISK ASSESSMENT DOCUMENTED: ICD-10-PCS | Mod: CPTII,S$GLB,, | Performed by: INTERNAL MEDICINE

## 2022-01-01 PROCEDURE — 99291 CRITICAL CARE FIRST HOUR: CPT | Mod: GC,,, | Performed by: EMERGENCY MEDICINE

## 2022-01-01 PROCEDURE — U0003 INFECTIOUS AGENT DETECTION BY NUCLEIC ACID (DNA OR RNA); SEVERE ACUTE RESPIRATORY SYNDROME CORONAVIRUS 2 (SARS-COV-2) (CORONAVIRUS DISEASE [COVID-19]), AMPLIFIED PROBE TECHNIQUE, MAKING USE OF HIGH THROUGHPUT TECHNOLOGIES AS DESCRIBED BY CMS-2020-01-R: HCPCS | Performed by: PHYSICIAN ASSISTANT

## 2022-01-01 PROCEDURE — 88112 PR  CYTOPATH, CELL ENHANCE TECH: ICD-10-PCS | Mod: 26,,, | Performed by: PATHOLOGY

## 2022-01-01 PROCEDURE — 83605 ASSAY OF LACTIC ACID: CPT | Performed by: STUDENT IN AN ORGANIZED HEALTH CARE EDUCATION/TRAINING PROGRAM

## 2022-01-01 PROCEDURE — 85007 BL SMEAR W/DIFF WBC COUNT: CPT

## 2022-01-01 PROCEDURE — 87502 INFLUENZA DNA AMP PROBE: CPT

## 2022-01-01 PROCEDURE — 3044F PR MOST RECENT HEMOGLOBIN A1C LEVEL <7.0%: ICD-10-PCS | Mod: CPTII,S$GLB,, | Performed by: PHYSICIAN ASSISTANT

## 2022-01-01 PROCEDURE — 86900 BLOOD TYPING SEROLOGIC ABO: CPT | Performed by: INTERNAL MEDICINE

## 2022-01-01 PROCEDURE — 82803 BLOOD GASES ANY COMBINATION: CPT

## 2022-01-01 PROCEDURE — U0002 COVID-19 LAB TEST NON-CDC: HCPCS | Performed by: PHYSICIAN ASSISTANT

## 2022-01-01 PROCEDURE — 99499 RISK ADDL DX/OHS AUDIT: ICD-10-PCS | Mod: S$GLB,,, | Performed by: NURSE PRACTITIONER

## 2022-01-01 PROCEDURE — 97530 THERAPEUTIC ACTIVITIES: CPT | Mod: CO

## 2022-01-01 PROCEDURE — 85007 BL SMEAR W/DIFF WBC COUNT: CPT | Performed by: PHYSICIAN ASSISTANT

## 2022-01-01 PROCEDURE — 99223 1ST HOSP IP/OBS HIGH 75: CPT | Mod: AI,,, | Performed by: INTERNAL MEDICINE

## 2022-01-01 PROCEDURE — 88312 PR  SPECIAL STAINS,GROUP I: ICD-10-PCS | Mod: 26,,, | Performed by: PATHOLOGY

## 2022-01-01 PROCEDURE — 99223 1ST HOSP IP/OBS HIGH 75: CPT | Mod: AI,GC,, | Performed by: INTERNAL MEDICINE

## 2022-01-01 PROCEDURE — 96367 TX/PROPH/DG ADDL SEQ IV INF: CPT

## 2022-01-01 PROCEDURE — 86922 COMPATIBILITY TEST ANTIGLOB: CPT | Performed by: STUDENT IN AN ORGANIZED HEALTH CARE EDUCATION/TRAINING PROGRAM

## 2022-01-01 PROCEDURE — 87070 CULTURE OTHR SPECIMN AEROBIC: CPT | Performed by: EMERGENCY MEDICINE

## 2022-01-01 PROCEDURE — 4010F ACE/ARB THERAPY RXD/TAKEN: CPT | Mod: CPTII,S$GLB,, | Performed by: INTERNAL MEDICINE

## 2022-01-01 PROCEDURE — 3078F DIAST BP <80 MM HG: CPT | Mod: CPTII,S$GLB,, | Performed by: NURSE PRACTITIONER

## 2022-01-01 PROCEDURE — 1157F PR ADVANCE CARE PLAN OR EQUIV PRESENT IN MEDICAL RECORD: ICD-10-PCS | Mod: CPTII,S$GLB,, | Performed by: NURSE PRACTITIONER

## 2022-01-01 PROCEDURE — 99226 PR SUBSEQUENT OBSERVATION CARE,LEVEL III: ICD-10-PCS | Mod: ,,, | Performed by: INTERNAL MEDICINE

## 2022-01-01 PROCEDURE — 63600175 PHARM REV CODE 636 W HCPCS

## 2022-01-01 PROCEDURE — 63600175 PHARM REV CODE 636 W HCPCS: Performed by: EMERGENCY MEDICINE

## 2022-01-01 PROCEDURE — 1159F MED LIST DOCD IN RCRD: CPT | Mod: CPTII,S$GLB,, | Performed by: PHYSICIAN ASSISTANT

## 2022-01-01 PROCEDURE — 3078F DIAST BP <80 MM HG: CPT | Mod: CPTII,S$GLB,, | Performed by: INTERNAL MEDICINE

## 2022-01-01 PROCEDURE — 3288F FALL RISK ASSESSMENT DOCD: CPT | Mod: CPTII,S$GLB,, | Performed by: PHYSICIAN ASSISTANT

## 2022-01-01 PROCEDURE — 83735 ASSAY OF MAGNESIUM: CPT | Performed by: EMERGENCY MEDICINE

## 2022-01-01 PROCEDURE — 99284 PR EMERGENCY DEPT VISIT,LEVEL IV: ICD-10-PCS | Mod: CS,,, | Performed by: EMERGENCY MEDICINE

## 2022-01-01 PROCEDURE — 97110 THERAPEUTIC EXERCISES: CPT | Mod: CO

## 2022-01-01 PROCEDURE — 86922 COMPATIBILITY TEST ANTIGLOB: CPT | Performed by: PHYSICIAN ASSISTANT

## 2022-01-01 PROCEDURE — 85007 BL SMEAR W/DIFF WBC COUNT: CPT | Mod: 91 | Performed by: STUDENT IN AN ORGANIZED HEALTH CARE EDUCATION/TRAINING PROGRAM

## 2022-01-01 PROCEDURE — 86901 BLOOD TYPING SEROLOGIC RH(D): CPT | Performed by: INTERNAL MEDICINE

## 2022-01-01 PROCEDURE — 82043 UR ALBUMIN QUANTITATIVE: CPT | Performed by: INTERNAL MEDICINE

## 2022-01-01 PROCEDURE — 82728 ASSAY OF FERRITIN: CPT | Performed by: NURSE PRACTITIONER

## 2022-01-01 PROCEDURE — 99291 CRITICAL CARE FIRST HOUR: CPT | Mod: CS,,, | Performed by: EMERGENCY MEDICINE

## 2022-01-01 PROCEDURE — 87210 SMEAR WET MOUNT SALINE/INK: CPT | Performed by: EMERGENCY MEDICINE

## 2022-01-01 PROCEDURE — 1160F PR REVIEW ALL MEDS BY PRESCRIBER/CLIN PHARMACIST DOCUMENTED: ICD-10-PCS | Mod: CPTII,S$GLB,, | Performed by: NURSE PRACTITIONER

## 2022-01-01 PROCEDURE — 85027 COMPLETE CBC AUTOMATED: CPT | Performed by: PHYSICIAN ASSISTANT

## 2022-01-01 PROCEDURE — 99215 PR OFFICE/OUTPT VISIT, EST, LEVL V, 40-54 MIN: ICD-10-PCS | Mod: S$GLB,,, | Performed by: PHYSICIAN ASSISTANT

## 2022-01-01 PROCEDURE — 99285 EMERGENCY DEPT VISIT HI MDM: CPT | Mod: CS,,, | Performed by: PHYSICIAN ASSISTANT

## 2022-01-01 PROCEDURE — 99225 PR SUBSEQUENT OBSERVATION CARE,LEVEL II: CPT | Mod: ,,, | Performed by: INTERNAL MEDICINE

## 2022-01-01 PROCEDURE — U0002 COVID-19 LAB TEST NON-CDC: HCPCS | Performed by: INTERNAL MEDICINE

## 2022-01-01 PROCEDURE — 36415 COLL VENOUS BLD VENIPUNCTURE: CPT | Performed by: HOSPITALIST

## 2022-01-01 PROCEDURE — 93010 ELECTROCARDIOGRAM REPORT: CPT | Mod: 76,,, | Performed by: INTERNAL MEDICINE

## 2022-01-01 PROCEDURE — 87077 CULTURE AEROBIC IDENTIFY: CPT | Mod: 59 | Performed by: STUDENT IN AN ORGANIZED HEALTH CARE EDUCATION/TRAINING PROGRAM

## 2022-01-01 PROCEDURE — 99285 EMERGENCY DEPT VISIT HI MDM: CPT | Mod: 25

## 2022-01-01 PROCEDURE — 99306 PR NURSING FACILITY CARE, INIT, HIGH SEVERITY: ICD-10-PCS | Mod: ,,, | Performed by: INTERNAL MEDICINE

## 2022-01-01 PROCEDURE — 99291 CRITICAL CARE FIRST HOUR: CPT | Mod: 25

## 2022-01-01 PROCEDURE — 99999 PR PBB SHADOW E&M-EST. PATIENT-LVL V: CPT | Mod: PBBFAC,,, | Performed by: NURSE PRACTITIONER

## 2022-01-01 PROCEDURE — 85060 PATHOLOGIST REVIEW: ICD-10-PCS | Mod: ,,, | Performed by: PATHOLOGY

## 2022-01-01 PROCEDURE — 84550 ASSAY OF BLOOD/URIC ACID: CPT | Performed by: PHYSICIAN ASSISTANT

## 2022-01-01 PROCEDURE — 3075F PR MOST RECENT SYSTOLIC BLOOD PRESS GE 130-139MM HG: ICD-10-PCS | Mod: CPTII,S$GLB,, | Performed by: NURSE PRACTITIONER

## 2022-01-01 PROCEDURE — 99223 PR INITIAL HOSPITAL CARE,LEVL III: ICD-10-PCS | Mod: ,,, | Performed by: INTERNAL MEDICINE

## 2022-01-01 PROCEDURE — 99233 SBSQ HOSP IP/OBS HIGH 50: CPT | Mod: GC,,, | Performed by: INTERNAL MEDICINE

## 2022-01-01 PROCEDURE — 99999 PR PBB SHADOW E&M-EST. PATIENT-LVL V: ICD-10-PCS | Mod: PBBFAC,,, | Performed by: INTERNAL MEDICINE

## 2022-01-01 PROCEDURE — 81001 URINALYSIS AUTO W/SCOPE: CPT | Performed by: NURSE PRACTITIONER

## 2022-01-01 PROCEDURE — 99306 1ST NF CARE HIGH MDM 50: CPT | Mod: ,,, | Performed by: INTERNAL MEDICINE

## 2022-01-01 PROCEDURE — 99499 RISK ADDL DX/OHS AUDIT: ICD-10-PCS | Mod: S$GLB,,, | Performed by: INTERNAL MEDICINE

## 2022-01-01 PROCEDURE — 87116 MYCOBACTERIA CULTURE: CPT | Performed by: EMERGENCY MEDICINE

## 2022-01-01 PROCEDURE — 87040 BLOOD CULTURE FOR BACTERIA: CPT | Performed by: EMERGENCY MEDICINE

## 2022-01-01 PROCEDURE — 86922 COMPATIBILITY TEST ANTIGLOB: CPT

## 2022-01-01 PROCEDURE — 83880 ASSAY OF NATRIURETIC PEPTIDE: CPT | Performed by: EMERGENCY MEDICINE

## 2022-01-01 PROCEDURE — 90837 PSYTX W PT 60 MINUTES: CPT | Mod: ,,, | Performed by: PSYCHOLOGIST

## 2022-01-01 PROCEDURE — 99223 1ST HOSP IP/OBS HIGH 75: CPT | Mod: ,,, | Performed by: INTERNAL MEDICINE

## 2022-01-01 PROCEDURE — 25500020 PHARM REV CODE 255: Performed by: EMERGENCY MEDICINE

## 2022-01-01 PROCEDURE — 3288F PR FALLS RISK ASSESSMENT DOCUMENTED: ICD-10-PCS | Mod: CPTII,S$GLB,, | Performed by: NURSE PRACTITIONER

## 2022-01-01 PROCEDURE — 86803 HEPATITIS C AB TEST: CPT | Performed by: EMERGENCY MEDICINE

## 2022-01-01 PROCEDURE — 99499 UNLISTED E&M SERVICE: CPT | Mod: S$GLB,,, | Performed by: NURSE PRACTITIONER

## 2022-01-01 PROCEDURE — 36415 COLL VENOUS BLD VENIPUNCTURE: CPT | Performed by: STUDENT IN AN ORGANIZED HEALTH CARE EDUCATION/TRAINING PROGRAM

## 2022-01-01 PROCEDURE — 1111F PR DISCHARGE MEDS RECONCILED W/ CURRENT OUTPATIENT MED LIST: ICD-10-PCS | Mod: CPTII,S$GLB,, | Performed by: INTERNAL MEDICINE

## 2022-01-01 PROCEDURE — 99499 UNLISTED E&M SERVICE: CPT | Mod: S$GLB,,, | Performed by: INTERNAL MEDICINE

## 2022-01-01 PROCEDURE — 99999 PR PBB SHADOW E&M-EST. PATIENT-LVL V: CPT | Mod: PBBFAC,,, | Performed by: INTERNAL MEDICINE

## 2022-01-01 PROCEDURE — 99223 PR INITIAL HOSPITAL CARE,LEVL III: ICD-10-PCS | Mod: ,,, | Performed by: PSYCHIATRY & NEUROLOGY

## 2022-01-01 PROCEDURE — 80053 COMPREHEN METABOLIC PANEL: CPT

## 2022-01-01 PROCEDURE — 87015 SPECIMEN INFECT AGNT CONCNTJ: CPT | Performed by: EMERGENCY MEDICINE

## 2022-01-01 PROCEDURE — 96360 HYDRATION IV INFUSION INIT: CPT

## 2022-01-01 PROCEDURE — 86901 BLOOD TYPING SEROLOGIC RH(D): CPT | Performed by: NURSE PRACTITIONER

## 2022-01-01 PROCEDURE — 3008F BODY MASS INDEX DOCD: CPT | Mod: CPTII,S$GLB,, | Performed by: INTERNAL MEDICINE

## 2022-01-01 PROCEDURE — 3288F FALL RISK ASSESSMENT DOCD: CPT | Mod: CPTII,S$GLB,, | Performed by: INTERNAL MEDICINE

## 2022-01-01 PROCEDURE — 90834 PSYTX W PT 45 MINUTES: CPT | Mod: ,,, | Performed by: PSYCHOLOGIST

## 2022-01-01 PROCEDURE — 99153 MOD SED SAME PHYS/QHP EA: CPT | Performed by: INTERNAL MEDICINE

## 2022-01-01 PROCEDURE — 1160F RVW MEDS BY RX/DR IN RCRD: CPT | Mod: CPTII,S$GLB,, | Performed by: NURSE PRACTITIONER

## 2022-01-01 PROCEDURE — 99217 PR OBSERVATION CARE DISCHARGE: ICD-10-PCS | Mod: GC,,, | Performed by: INTERNAL MEDICINE

## 2022-01-01 PROCEDURE — 1126F PR PAIN SEVERITY QUANTIFIED, NO PAIN PRESENT: ICD-10-PCS | Mod: CPTII,S$GLB,, | Performed by: NURSE PRACTITIONER

## 2022-01-01 PROCEDURE — 80053 COMPREHEN METABOLIC PANEL: CPT | Mod: 91 | Performed by: NURSE PRACTITIONER

## 2022-01-01 PROCEDURE — 84145 PROCALCITONIN (PCT): CPT | Performed by: EMERGENCY MEDICINE

## 2022-01-01 PROCEDURE — 3075F PR MOST RECENT SYSTOLIC BLOOD PRESS GE 130-139MM HG: ICD-10-PCS | Mod: CPTII,S$GLB,, | Performed by: PHYSICIAN ASSISTANT

## 2022-01-01 PROCEDURE — 31624 DX BRONCHOSCOPE/LAVAGE: CPT | Performed by: INTERNAL MEDICINE

## 2022-01-01 PROCEDURE — 99285 EMERGENCY DEPT VISIT HI MDM: CPT | Mod: ,,, | Performed by: EMERGENCY MEDICINE

## 2022-01-01 PROCEDURE — 99215 OFFICE O/P EST HI 40 MIN: CPT | Mod: S$GLB,,, | Performed by: PHYSICIAN ASSISTANT

## 2022-01-01 PROCEDURE — 87102 FUNGUS ISOLATION CULTURE: CPT | Performed by: EMERGENCY MEDICINE

## 2022-01-01 PROCEDURE — 80048 BASIC METABOLIC PNL TOTAL CA: CPT | Performed by: NURSE PRACTITIONER

## 2022-01-01 PROCEDURE — 3074F SYST BP LT 130 MM HG: CPT | Mod: CPTII,S$GLB,, | Performed by: NURSE PRACTITIONER

## 2022-01-01 PROCEDURE — 84484 ASSAY OF TROPONIN QUANT: CPT | Performed by: STUDENT IN AN ORGANIZED HEALTH CARE EDUCATION/TRAINING PROGRAM

## 2022-01-01 PROCEDURE — 1101F PR PT FALLS ASSESS DOC 0-1 FALLS W/OUT INJ PAST YR: ICD-10-PCS | Mod: CPTII,S$GLB,, | Performed by: NURSE PRACTITIONER

## 2022-01-01 PROCEDURE — 1126F PR PAIN SEVERITY QUANTIFIED, NO PAIN PRESENT: ICD-10-PCS | Mod: CPTII,S$GLB,, | Performed by: INTERNAL MEDICINE

## 2022-01-01 PROCEDURE — 25500020 PHARM REV CODE 255: Performed by: INTERNAL MEDICINE

## 2022-01-01 PROCEDURE — 99499 RISK ADDL DX/OHS AUDIT: ICD-10-PCS | Mod: S$GLB,,, | Performed by: PHYSICIAN ASSISTANT

## 2022-01-01 PROCEDURE — 3008F PR BODY MASS INDEX (BMI) DOCUMENTED: ICD-10-PCS | Mod: CPTII,S$GLB,, | Performed by: PHYSICIAN ASSISTANT

## 2022-01-01 PROCEDURE — 99217 PR OBSERVATION CARE DISCHARGE: CPT | Mod: ,,, | Performed by: INTERNAL MEDICINE

## 2022-01-01 PROCEDURE — 3008F PR BODY MASS INDEX (BMI) DOCUMENTED: ICD-10-PCS | Mod: CPTII,S$GLB,, | Performed by: NURSE PRACTITIONER

## 2022-01-01 PROCEDURE — 86902 BLOOD TYPE ANTIGEN DONOR EA: CPT | Performed by: STUDENT IN AN ORGANIZED HEALTH CARE EDUCATION/TRAINING PROGRAM

## 2022-01-01 PROCEDURE — 96372 THER/PROPH/DIAG INJ SC/IM: CPT | Performed by: STUDENT IN AN ORGANIZED HEALTH CARE EDUCATION/TRAINING PROGRAM

## 2022-01-01 PROCEDURE — 3044F PR MOST RECENT HEMOGLOBIN A1C LEVEL <7.0%: ICD-10-PCS | Mod: CPTII,S$GLB,, | Performed by: INTERNAL MEDICINE

## 2022-01-01 PROCEDURE — 85610 PROTHROMBIN TIME: CPT | Performed by: EMERGENCY MEDICINE

## 2022-01-01 PROCEDURE — 80048 BASIC METABOLIC PNL TOTAL CA: CPT | Mod: XB | Performed by: PHYSICIAN ASSISTANT

## 2022-01-01 PROCEDURE — 85027 COMPLETE CBC AUTOMATED: CPT | Performed by: HOSPITALIST

## 2022-01-01 PROCEDURE — 85007 BL SMEAR W/DIFF WBC COUNT: CPT | Performed by: HOSPITALIST

## 2022-01-01 PROCEDURE — 99285 PR EMERGENCY DEPT VISIT,LEVEL V: ICD-10-PCS | Mod: ,,, | Performed by: EMERGENCY MEDICINE

## 2022-01-01 PROCEDURE — S4991 NICOTINE PATCH NONLEGEND: HCPCS | Performed by: PHYSICIAN ASSISTANT

## 2022-01-01 PROCEDURE — 88312 SPECIAL STAINS GROUP 1: CPT | Mod: 26,,, | Performed by: PATHOLOGY

## 2022-01-01 PROCEDURE — 1125F PR PAIN SEVERITY QUANTIFIED, PAIN PRESENT: ICD-10-PCS | Mod: CPTII,S$GLB,, | Performed by: PHYSICIAN ASSISTANT

## 2022-01-01 PROCEDURE — 88112 CYTOPATH CELL ENHANCE TECH: CPT | Performed by: PATHOLOGY

## 2022-01-01 PROCEDURE — 3288F FALL RISK ASSESSMENT DOCD: CPT | Mod: CPTII,S$GLB,, | Performed by: NURSE PRACTITIONER

## 2022-01-01 PROCEDURE — 99999 PR PBB SHADOW E&M-EST. PATIENT-LVL V: ICD-10-PCS | Mod: PBBFAC,,, | Performed by: NURSE PRACTITIONER

## 2022-01-01 PROCEDURE — 1126F AMNT PAIN NOTED NONE PRSNT: CPT | Mod: CPTII,S$GLB,, | Performed by: INTERNAL MEDICINE

## 2022-01-01 PROCEDURE — 83036 HEMOGLOBIN GLYCOSYLATED A1C: CPT | Performed by: HOSPITALIST

## 2022-01-01 PROCEDURE — 96402 CHEMO HORMON ANTINEOPL SQ/IM: CPT

## 2022-01-01 PROCEDURE — 80053 COMPREHEN METABOLIC PANEL: CPT | Performed by: EMERGENCY MEDICINE

## 2022-01-01 PROCEDURE — 80053 COMPREHEN METABOLIC PANEL: CPT | Performed by: HOSPITALIST

## 2022-01-01 PROCEDURE — 71250 CT THORAX DX C-: CPT | Mod: TC

## 2022-01-01 PROCEDURE — 99283 EMERGENCY DEPT VISIT LOW MDM: CPT

## 2022-01-01 PROCEDURE — 99306 PR NURSING FACILITY CARE, INIT, HIGH SEVERITY: ICD-10-PCS | Mod: AI,,, | Performed by: HOSPITALIST

## 2022-01-01 PROCEDURE — 86140 C-REACTIVE PROTEIN: CPT | Performed by: PHYSICIAN ASSISTANT

## 2022-01-01 PROCEDURE — 1125F PR PAIN SEVERITY QUANTIFIED, PAIN PRESENT: ICD-10-PCS | Mod: CPTII,S$GLB,, | Performed by: NURSE PRACTITIONER

## 2022-01-01 PROCEDURE — 3044F HG A1C LEVEL LT 7.0%: CPT | Mod: CPTII,S$GLB,, | Performed by: PHYSICIAN ASSISTANT

## 2022-01-01 PROCEDURE — 87206 SMEAR FLUORESCENT/ACID STAI: CPT | Performed by: EMERGENCY MEDICINE

## 2022-01-01 PROCEDURE — 1157F ADVNC CARE PLAN IN RCRD: CPT | Mod: CPTII,S$GLB,, | Performed by: NURSE PRACTITIONER

## 2022-01-01 PROCEDURE — 85007 BL SMEAR W/DIFF WBC COUNT: CPT | Performed by: INTERNAL MEDICINE

## 2022-01-01 PROCEDURE — 3075F SYST BP GE 130 - 139MM HG: CPT | Mod: CPTII,S$GLB,, | Performed by: PHYSICIAN ASSISTANT

## 2022-01-01 PROCEDURE — 87205 SMEAR GRAM STAIN: CPT | Mod: 59 | Performed by: EMERGENCY MEDICINE

## 2022-01-01 PROCEDURE — 1126F AMNT PAIN NOTED NONE PRSNT: CPT | Mod: CPTII,S$GLB,, | Performed by: NURSE PRACTITIONER

## 2022-01-01 PROCEDURE — 31623 DX BRONCHOSCOPE/BRUSH: CPT | Performed by: INTERNAL MEDICINE

## 2022-01-01 PROCEDURE — 96361 HYDRATE IV INFUSION ADD-ON: CPT | Mod: 59

## 2022-01-01 PROCEDURE — 1125F PR PAIN SEVERITY QUANTIFIED, PAIN PRESENT: ICD-10-PCS | Mod: CPTII,S$GLB,, | Performed by: INTERNAL MEDICINE

## 2022-01-01 PROCEDURE — U0005 INFEC AGEN DETEC AMPLI PROBE: HCPCS | Performed by: NURSE PRACTITIONER

## 2022-01-01 PROCEDURE — 96368 THER/DIAG CONCURRENT INF: CPT

## 2022-01-01 PROCEDURE — 99217 PR OBSERVATION CARE DISCHARGE: CPT | Mod: GC,,, | Performed by: INTERNAL MEDICINE

## 2022-01-01 PROCEDURE — 82570 ASSAY OF URINE CREATININE: CPT | Performed by: INTERNAL MEDICINE

## 2022-01-01 PROCEDURE — 36600 WITHDRAWAL OF ARTERIAL BLOOD: CPT

## 2022-01-01 PROCEDURE — U0003 INFECTIOUS AGENT DETECTION BY NUCLEIC ACID (DNA OR RNA); SEVERE ACUTE RESPIRATORY SYNDROME CORONAVIRUS 2 (SARS-COV-2) (CORONAVIRUS DISEASE [COVID-19]), AMPLIFIED PROBE TECHNIQUE, MAKING USE OF HIGH THROUGHPUT TECHNOLOGIES AS DESCRIBED BY CMS-2020-01-R: HCPCS | Performed by: NURSE PRACTITIONER

## 2022-01-01 PROCEDURE — 99999 PR PBB SHADOW E&M-EST. PATIENT-LVL IV: CPT | Mod: PBBFAC,,, | Performed by: PHYSICIAN ASSISTANT

## 2022-01-01 PROCEDURE — 97166 OT EVAL MOD COMPLEX 45 MIN: CPT

## 2022-01-01 PROCEDURE — 63600175 PHARM REV CODE 636 W HCPCS: Mod: JG | Performed by: STUDENT IN AN ORGANIZED HEALTH CARE EDUCATION/TRAINING PROGRAM

## 2022-01-01 PROCEDURE — 83930 ASSAY OF BLOOD OSMOLALITY: CPT | Performed by: PHYSICIAN ASSISTANT

## 2022-01-01 PROCEDURE — 1125F AMNT PAIN NOTED PAIN PRSNT: CPT | Mod: CPTII,S$GLB,, | Performed by: INTERNAL MEDICINE

## 2022-01-01 PROCEDURE — 85025 COMPLETE CBC W/AUTO DIFF WBC: CPT | Performed by: STUDENT IN AN ORGANIZED HEALTH CARE EDUCATION/TRAINING PROGRAM

## 2022-01-01 PROCEDURE — 83935 ASSAY OF URINE OSMOLALITY: CPT | Performed by: PHYSICIAN ASSISTANT

## 2022-01-01 PROCEDURE — 99223 PR INITIAL HOSPITAL CARE,LEVL III: ICD-10-PCS | Mod: AI,,, | Performed by: INTERNAL MEDICINE

## 2022-01-01 PROCEDURE — 1157F ADVNC CARE PLAN IN RCRD: CPT | Mod: CPTII,S$GLB,, | Performed by: PHYSICIAN ASSISTANT

## 2022-01-01 PROCEDURE — 83735 ASSAY OF MAGNESIUM: CPT | Performed by: PHYSICIAN ASSISTANT

## 2022-01-01 PROCEDURE — 86901 BLOOD TYPING SEROLOGIC RH(D): CPT | Performed by: EMERGENCY MEDICINE

## 2022-01-01 PROCEDURE — 96376 TX/PRO/DX INJ SAME DRUG ADON: CPT | Mod: 59

## 2022-01-01 PROCEDURE — 99284 EMERGENCY DEPT VISIT MOD MDM: CPT | Mod: CS,,, | Performed by: EMERGENCY MEDICINE

## 2022-01-01 PROCEDURE — 1159F PR MEDICATION LIST DOCUMENTED IN MEDICAL RECORD: ICD-10-PCS | Mod: CPTII,S$GLB,, | Performed by: PHYSICIAN ASSISTANT

## 2022-01-01 PROCEDURE — 96365 THER/PROPH/DIAG IV INF INIT: CPT | Mod: 59

## 2022-01-01 PROCEDURE — 88305 TISSUE EXAM BY PATHOLOGIST: CPT | Mod: 59 | Performed by: PATHOLOGY

## 2022-01-01 PROCEDURE — 99214 OFFICE O/P EST MOD 30 MIN: CPT | Mod: S$GLB,,, | Performed by: INTERNAL MEDICINE

## 2022-01-01 PROCEDURE — 88305 TISSUE EXAM BY PATHOLOGIST: ICD-10-PCS | Mod: 26,,, | Performed by: PATHOLOGY

## 2022-01-01 PROCEDURE — 71250 CT THORAX DX C-: CPT | Mod: 26,,, | Performed by: RADIOLOGY

## 2022-01-01 PROCEDURE — 99220 PR INITIAL OBSERVATION CARE,LEVL III: ICD-10-PCS | Mod: ,,, | Performed by: INTERNAL MEDICINE

## 2022-01-01 PROCEDURE — 93010 EKG 12-LEAD: ICD-10-PCS | Mod: 76,,, | Performed by: INTERNAL MEDICINE

## 2022-01-01 PROCEDURE — U0002 COVID-19 LAB TEST NON-CDC: HCPCS | Performed by: EMERGENCY MEDICINE

## 2022-01-01 PROCEDURE — 81003 URINALYSIS AUTO W/O SCOPE: CPT | Performed by: EMERGENCY MEDICINE

## 2022-01-01 PROCEDURE — 99291 PR CRITICAL CARE, E/M 30-74 MINUTES: ICD-10-PCS | Mod: CS,,, | Performed by: EMERGENCY MEDICINE

## 2022-01-01 PROCEDURE — 12000002 HC ACUTE/MED SURGE SEMI-PRIVATE ROOM

## 2022-01-01 PROCEDURE — 3075F SYST BP GE 130 - 139MM HG: CPT | Mod: CPTII,S$GLB,, | Performed by: NURSE PRACTITIONER

## 2022-01-01 PROCEDURE — 3078F DIAST BP <80 MM HG: CPT | Mod: CPTII,S$GLB,, | Performed by: PHYSICIAN ASSISTANT

## 2022-01-01 PROCEDURE — 90837 PR PSYCHOTHERAPY W/PATIENT, 60 MIN: ICD-10-PCS | Mod: ,,, | Performed by: PSYCHOLOGIST

## 2022-01-01 PROCEDURE — 85610 PROTHROMBIN TIME: CPT | Performed by: STUDENT IN AN ORGANIZED HEALTH CARE EDUCATION/TRAINING PROGRAM

## 2022-01-01 PROCEDURE — 96374 THER/PROPH/DIAG INJ IV PUSH: CPT | Mod: 59

## 2022-01-01 PROCEDURE — 86902 BLOOD TYPE ANTIGEN DONOR EA: CPT | Mod: 59 | Performed by: NURSE PRACTITIONER

## 2022-01-01 PROCEDURE — 80047 BASIC METABLC PNL IONIZED CA: CPT | Mod: 91

## 2022-01-01 PROCEDURE — 88112 CYTOPATH CELL ENHANCE TECH: CPT | Mod: 26,,, | Performed by: PATHOLOGY

## 2022-01-01 PROCEDURE — 87186 SC STD MICRODIL/AGAR DIL: CPT | Mod: 59 | Performed by: STUDENT IN AN ORGANIZED HEALTH CARE EDUCATION/TRAINING PROGRAM

## 2022-01-01 PROCEDURE — 85007 BL SMEAR W/DIFF WBC COUNT: CPT | Mod: NCS | Performed by: STUDENT IN AN ORGANIZED HEALTH CARE EDUCATION/TRAINING PROGRAM

## 2022-01-01 PROCEDURE — 86580 TB INTRADERMAL TEST: CPT | Performed by: NURSE PRACTITIONER

## 2022-01-01 PROCEDURE — 96366 THER/PROPH/DIAG IV INF ADDON: CPT | Mod: 59

## 2022-01-01 PROCEDURE — 99223 PR INITIAL HOSPITAL CARE,LEVL III: ICD-10-PCS | Mod: AI,GC,, | Performed by: INTERNAL MEDICINE

## 2022-01-01 PROCEDURE — 99217 PR OBSERVATION CARE DISCHARGE: ICD-10-PCS | Mod: ,,, | Performed by: INTERNAL MEDICINE

## 2022-01-01 PROCEDURE — 99215 OFFICE O/P EST HI 40 MIN: CPT | Mod: S$GLB,,, | Performed by: INTERNAL MEDICINE

## 2022-01-01 PROCEDURE — 86902 BLOOD TYPE ANTIGEN DONOR EA: CPT | Performed by: NURSE PRACTITIONER

## 2022-01-01 PROCEDURE — 85652 RBC SED RATE AUTOMATED: CPT | Performed by: PHYSICIAN ASSISTANT

## 2022-01-01 PROCEDURE — 27000207 HC ISOLATION

## 2022-01-01 PROCEDURE — 86902 BLOOD TYPE ANTIGEN DONOR EA: CPT | Mod: 59 | Performed by: INTERNAL MEDICINE

## 2022-01-01 PROCEDURE — 1125F AMNT PAIN NOTED PAIN PRSNT: CPT | Mod: CPTII,S$GLB,, | Performed by: PHYSICIAN ASSISTANT

## 2022-01-01 PROCEDURE — 71250 CT CHEST WITHOUT CONTRAST: ICD-10-PCS | Mod: 26,,, | Performed by: RADIOLOGY

## 2022-01-01 PROCEDURE — 83605 ASSAY OF LACTIC ACID: CPT | Performed by: EMERGENCY MEDICINE

## 2022-01-01 RX ORDER — GUAIFENESIN 600 MG/1
600 TABLET, EXTENDED RELEASE ORAL 2 TIMES DAILY
Status: DISCONTINUED | OUTPATIENT
Start: 2022-01-01 | End: 2022-01-01

## 2022-01-01 RX ORDER — ENOXAPARIN SODIUM 100 MG/ML
40 INJECTION SUBCUTANEOUS EVERY 24 HOURS
Status: DISCONTINUED | OUTPATIENT
Start: 2022-01-01 | End: 2022-01-01

## 2022-01-01 RX ORDER — AZACITIDINE 100 MG/1
75 INJECTION, POWDER, LYOPHILIZED, FOR SOLUTION INTRAVENOUS; SUBCUTANEOUS
Status: CANCELLED | OUTPATIENT
Start: 2022-01-01

## 2022-01-01 RX ORDER — CIPROFLOXACIN 500 MG/1
500 TABLET ORAL EVERY 12 HOURS
Status: DISCONTINUED | OUTPATIENT
Start: 2022-01-01 | End: 2022-01-01 | Stop reason: HOSPADM

## 2022-01-01 RX ORDER — ONDANSETRON 8 MG/1
8 TABLET, ORALLY DISINTEGRATING ORAL EVERY 8 HOURS PRN
Status: DISCONTINUED | OUTPATIENT
Start: 2022-01-01 | End: 2022-01-01 | Stop reason: HOSPADM

## 2022-01-01 RX ORDER — ONDANSETRON 8 MG/1
8 TABLET, ORALLY DISINTEGRATING ORAL ONCE
Status: CANCELLED | OUTPATIENT
Start: 2022-01-01 | End: 2022-01-01

## 2022-01-01 RX ORDER — CARVEDILOL 12.5 MG/1
12.5 TABLET ORAL 2 TIMES DAILY WITH MEALS
Qty: 120 TABLET | Refills: 0 | Status: ON HOLD | OUTPATIENT
Start: 2022-01-01 | End: 2022-01-01 | Stop reason: HOSPADM

## 2022-01-01 RX ORDER — ONDANSETRON 4 MG/1
8 TABLET, ORALLY DISINTEGRATING ORAL ONCE
Status: DISCONTINUED | OUTPATIENT
Start: 2022-01-01 | End: 2022-01-01 | Stop reason: HOSPADM

## 2022-01-01 RX ORDER — HYDROCHLOROTHIAZIDE 25 MG/1
25 TABLET ORAL DAILY
Status: DISCONTINUED | OUTPATIENT
Start: 2022-01-01 | End: 2022-01-01 | Stop reason: HOSPADM

## 2022-01-01 RX ORDER — PREDNISONE 20 MG/1
40 TABLET ORAL DAILY
Status: DISCONTINUED | OUTPATIENT
Start: 2022-01-01 | End: 2022-01-01 | Stop reason: HOSPADM

## 2022-01-01 RX ORDER — DIPHENHYDRAMINE HCL 25 MG
25 CAPSULE ORAL
Status: COMPLETED | OUTPATIENT
Start: 2022-01-01 | End: 2022-01-01

## 2022-01-01 RX ORDER — LANOLIN ALCOHOL/MO/W.PET/CERES
800 CREAM (GRAM) TOPICAL 2 TIMES DAILY
Qty: 60 TABLET | Refills: 0 | Status: SHIPPED | OUTPATIENT
Start: 2022-01-01 | End: 2022-01-01

## 2022-01-01 RX ORDER — AMOXICILLIN 250 MG
1 CAPSULE ORAL 2 TIMES DAILY
Status: DISCONTINUED | OUTPATIENT
Start: 2022-01-01 | End: 2022-01-01 | Stop reason: HOSPADM

## 2022-01-01 RX ORDER — DIPHENHYDRAMINE HCL 25 MG
25 CAPSULE ORAL
Status: CANCELLED | OUTPATIENT
Start: 2022-01-01

## 2022-01-01 RX ORDER — CIPROFLOXACIN 500 MG/1
500 TABLET ORAL 2 TIMES DAILY
Qty: 60 TABLET | Refills: 5 | Status: ON HOLD | OUTPATIENT
Start: 2022-01-01 | End: 2022-01-01 | Stop reason: HOSPADM

## 2022-01-01 RX ORDER — CEFEPIME HYDROCHLORIDE 1 G/50ML
2 INJECTION, SOLUTION INTRAVENOUS
Status: DISCONTINUED | OUTPATIENT
Start: 2022-01-01 | End: 2022-01-01

## 2022-01-01 RX ORDER — IBUPROFEN 200 MG
16 TABLET ORAL
Status: DISCONTINUED | OUTPATIENT
Start: 2022-01-01 | End: 2022-01-01 | Stop reason: HOSPADM

## 2022-01-01 RX ORDER — MAG HYDROX/ALUMINUM HYD/SIMETH 200-200-20
30 SUSPENSION, ORAL (FINAL DOSE FORM) ORAL
Status: DISCONTINUED | OUTPATIENT
Start: 2022-01-01 | End: 2022-01-01 | Stop reason: HOSPADM

## 2022-01-01 RX ORDER — NIFEDIPINE 60 MG/1
60 TABLET, EXTENDED RELEASE ORAL DAILY
Qty: 30 TABLET | Refills: 11 | Status: SHIPPED | OUTPATIENT
Start: 2022-01-01 | End: 2023-05-19

## 2022-01-01 RX ORDER — SODIUM CHLORIDE 0.9 % (FLUSH) 0.9 %
10 SYRINGE (ML) INJECTION
Status: CANCELLED | OUTPATIENT
Start: 2022-01-01

## 2022-01-01 RX ORDER — ACETAMINOPHEN 325 MG/1
1300 TABLET ORAL DAILY PRN
Status: DISCONTINUED | OUTPATIENT
Start: 2022-01-01 | End: 2022-01-01

## 2022-01-01 RX ORDER — HYDRALAZINE HYDROCHLORIDE 25 MG/1
25 TABLET, FILM COATED ORAL EVERY 8 HOURS PRN
Status: DISCONTINUED | OUTPATIENT
Start: 2022-01-01 | End: 2022-01-01 | Stop reason: HOSPADM

## 2022-01-01 RX ORDER — PANTOPRAZOLE SODIUM 40 MG/1
40 TABLET, DELAYED RELEASE ORAL DAILY
Status: DISCONTINUED | OUTPATIENT
Start: 2022-01-01 | End: 2022-01-01 | Stop reason: HOSPADM

## 2022-01-01 RX ORDER — ACYCLOVIR 200 MG/1
400 CAPSULE ORAL 2 TIMES DAILY
Status: DISCONTINUED | OUTPATIENT
Start: 2022-01-01 | End: 2022-01-01 | Stop reason: HOSPADM

## 2022-01-01 RX ORDER — ACETAMINOPHEN 325 MG/1
650 TABLET ORAL EVERY 6 HOURS PRN
Status: DISCONTINUED | OUTPATIENT
Start: 2022-01-01 | End: 2022-01-01

## 2022-01-01 RX ORDER — AZACITIDINE 100 MG/1
75 INJECTION, POWDER, LYOPHILIZED, FOR SOLUTION INTRAVENOUS; SUBCUTANEOUS
Status: COMPLETED | OUTPATIENT
Start: 2022-01-01 | End: 2022-01-01

## 2022-01-01 RX ORDER — ALLOPURINOL 100 MG/1
100 TABLET ORAL DAILY
Status: DISCONTINUED | OUTPATIENT
Start: 2022-01-01 | End: 2022-01-01 | Stop reason: HOSPADM

## 2022-01-01 RX ORDER — PREDNISONE 20 MG/1
20 TABLET ORAL DAILY
Status: DISCONTINUED | OUTPATIENT
Start: 2022-07-08 | End: 2022-01-01 | Stop reason: HOSPADM

## 2022-01-01 RX ORDER — COLCHICINE 0.6 MG/1
0.6 TABLET, FILM COATED ORAL ONCE
Status: COMPLETED | OUTPATIENT
Start: 2022-01-01 | End: 2022-01-01

## 2022-01-01 RX ORDER — ACETAMINOPHEN, DIPHENHYDRAMINE HCL, PHENYLEPHRINE HCL 325; 25; 5 MG/1; MG/1; MG/1
10 TABLET ORAL DAILY
Status: ON HOLD
Start: 2022-01-01 | End: 2022-01-01

## 2022-01-01 RX ORDER — LISINOPRIL 20 MG/1
40 TABLET ORAL DAILY
Status: DISCONTINUED | OUTPATIENT
Start: 2022-01-01 | End: 2022-01-01

## 2022-01-01 RX ORDER — DEXTROSE 4 G
TABLET,CHEWABLE ORAL
Qty: 1 EACH | Refills: 0 | Status: SHIPPED | OUTPATIENT
Start: 2022-01-01

## 2022-01-01 RX ORDER — NIFEDIPINE 30 MG/1
30 TABLET, EXTENDED RELEASE ORAL NIGHTLY
Status: DISCONTINUED | OUTPATIENT
Start: 2022-01-01 | End: 2022-01-01

## 2022-01-01 RX ORDER — CITALOPRAM 20 MG/1
20 TABLET, FILM COATED ORAL DAILY
Status: DISCONTINUED | OUTPATIENT
Start: 2022-01-01 | End: 2022-01-01 | Stop reason: HOSPADM

## 2022-01-01 RX ORDER — HEPARIN 100 UNIT/ML
5 SYRINGE INTRAVENOUS ONCE
Status: DISCONTINUED | OUTPATIENT
Start: 2022-01-01 | End: 2022-01-01

## 2022-01-01 RX ORDER — EVOLOCUMAB 140 MG/ML
INJECTION, SOLUTION SUBCUTANEOUS
Qty: 6 ML | Refills: 3 | Status: SHIPPED | OUTPATIENT
Start: 2022-01-01

## 2022-01-01 RX ORDER — NIFEDIPINE 60 MG/1
60 TABLET, EXTENDED RELEASE ORAL DAILY
Status: DISCONTINUED | OUTPATIENT
Start: 2022-01-01 | End: 2022-01-01

## 2022-01-01 RX ORDER — ACETAMINOPHEN 325 MG/1
650 TABLET ORAL EVERY 6 HOURS PRN
Status: DISCONTINUED | OUTPATIENT
Start: 2022-01-01 | End: 2022-01-01 | Stop reason: HOSPADM

## 2022-01-01 RX ORDER — CHLORTHALIDONE 25 MG/1
25 TABLET ORAL DAILY
Status: DISCONTINUED | OUTPATIENT
Start: 2022-01-01 | End: 2022-01-01 | Stop reason: HOSPADM

## 2022-01-01 RX ORDER — LEVOFLOXACIN 750 MG/1
750 TABLET ORAL DAILY
Status: DISCONTINUED | OUTPATIENT
Start: 2022-01-01 | End: 2022-01-01 | Stop reason: HOSPADM

## 2022-01-01 RX ORDER — DIPHENHYDRAMINE HYDROCHLORIDE 50 MG/ML
12.5 INJECTION INTRAMUSCULAR; INTRAVENOUS
Status: COMPLETED | OUTPATIENT
Start: 2022-01-01 | End: 2022-01-01

## 2022-01-01 RX ORDER — TALC
6 POWDER (GRAM) TOPICAL NIGHTLY PRN
Status: DISCONTINUED | OUTPATIENT
Start: 2022-01-01 | End: 2022-01-01

## 2022-01-01 RX ORDER — HEPARIN 100 UNIT/ML
5 SYRINGE INTRAVENOUS ONCE
Status: COMPLETED | OUTPATIENT
Start: 2022-01-01 | End: 2022-01-01

## 2022-01-01 RX ORDER — DIPHENHYDRAMINE HCL 25 MG
25 CAPSULE ORAL ONCE AS NEEDED
Status: COMPLETED | OUTPATIENT
Start: 2022-01-01 | End: 2022-01-01

## 2022-01-01 RX ORDER — NALOXONE HCL 0.4 MG/ML
0.02 VIAL (ML) INJECTION
Status: DISCONTINUED | OUTPATIENT
Start: 2022-01-01 | End: 2022-01-01 | Stop reason: HOSPADM

## 2022-01-01 RX ORDER — ONDANSETRON 4 MG/1
8 TABLET, ORALLY DISINTEGRATING ORAL ONCE
Status: COMPLETED | OUTPATIENT
Start: 2022-01-01 | End: 2022-01-01

## 2022-01-01 RX ORDER — PREDNISONE 5 MG/1
5 TABLET ORAL DAILY
Status: DISCONTINUED | OUTPATIENT
Start: 2022-08-06 | End: 2022-01-01 | Stop reason: HOSPADM

## 2022-01-01 RX ORDER — ACETAMINOPHEN 325 MG/1
650 TABLET ORAL
Status: COMPLETED | OUTPATIENT
Start: 2022-01-01 | End: 2022-01-01

## 2022-01-01 RX ORDER — GABAPENTIN 100 MG/1
100 CAPSULE ORAL 2 TIMES DAILY
Qty: 60 CAPSULE | Refills: 5 | Status: SHIPPED | OUTPATIENT
Start: 2022-01-01 | End: 2022-01-01

## 2022-01-01 RX ORDER — HYDROCODONE BITARTRATE AND ACETAMINOPHEN 500; 5 MG/1; MG/1
TABLET ORAL ONCE
Status: CANCELLED | OUTPATIENT
Start: 2022-01-01 | End: 2022-01-01

## 2022-01-01 RX ORDER — TALC
9 POWDER (GRAM) TOPICAL NIGHTLY PRN
Status: DISCONTINUED | OUTPATIENT
Start: 2022-01-01 | End: 2022-01-01 | Stop reason: HOSPADM

## 2022-01-01 RX ORDER — IBUPROFEN 200 MG
24 TABLET ORAL
Status: DISCONTINUED | OUTPATIENT
Start: 2022-01-01 | End: 2022-01-01 | Stop reason: HOSPADM

## 2022-01-01 RX ORDER — CITALOPRAM 20 MG/1
20 TABLET, FILM COATED ORAL DAILY
Qty: 30 TABLET | Refills: 2 | Status: SHIPPED | OUTPATIENT
Start: 2022-01-01

## 2022-01-01 RX ORDER — PREDNISONE 20 MG/1
60 TABLET ORAL DAILY
Status: DISCONTINUED | OUTPATIENT
Start: 2022-01-01 | End: 2022-01-01 | Stop reason: HOSPADM

## 2022-01-01 RX ORDER — POLYETHYLENE GLYCOL 3350 17 G/17G
17 POWDER, FOR SOLUTION ORAL DAILY PRN
Status: DISCONTINUED | OUTPATIENT
Start: 2022-01-01 | End: 2022-01-01 | Stop reason: HOSPADM

## 2022-01-01 RX ORDER — LISINOPRIL 20 MG/1
20 TABLET ORAL DAILY
Status: DISCONTINUED | OUTPATIENT
Start: 2022-01-01 | End: 2022-01-01 | Stop reason: HOSPADM

## 2022-01-01 RX ORDER — MORPHINE SULFATE 4 MG/ML
4 INJECTION, SOLUTION INTRAMUSCULAR; INTRAVENOUS
Status: COMPLETED | OUTPATIENT
Start: 2022-01-01 | End: 2022-01-01

## 2022-01-01 RX ORDER — ASPIRIN 81 MG/1
81 TABLET ORAL DAILY
Status: DISCONTINUED | OUTPATIENT
Start: 2022-01-01 | End: 2022-01-01 | Stop reason: HOSPADM

## 2022-01-01 RX ORDER — CARVEDILOL 12.5 MG/1
12.5 TABLET ORAL 2 TIMES DAILY WITH MEALS
Status: DISCONTINUED | OUTPATIENT
Start: 2022-01-01 | End: 2022-01-01

## 2022-01-01 RX ORDER — ACETAMINOPHEN 500 MG
500 TABLET ORAL
Status: COMPLETED | OUTPATIENT
Start: 2022-01-01 | End: 2022-01-01

## 2022-01-01 RX ORDER — DAPSONE 100 MG/1
100 TABLET ORAL DAILY
Status: DISCONTINUED | OUTPATIENT
Start: 2022-01-01 | End: 2022-01-01 | Stop reason: HOSPADM

## 2022-01-01 RX ORDER — LANOLIN ALCOHOL/MO/W.PET/CERES
400 CREAM (GRAM) TOPICAL 2 TIMES DAILY
Status: DISCONTINUED | OUTPATIENT
Start: 2022-01-01 | End: 2022-01-01

## 2022-01-01 RX ORDER — MIRTAZAPINE 7.5 MG/1
7.5 TABLET, FILM COATED ORAL NIGHTLY
Status: DISCONTINUED | OUTPATIENT
Start: 2022-01-01 | End: 2022-01-01 | Stop reason: HOSPADM

## 2022-01-01 RX ORDER — ACETAMINOPHEN 325 MG/1
650 TABLET ORAL EVERY 8 HOURS PRN
Status: DISCONTINUED | OUTPATIENT
Start: 2022-01-01 | End: 2022-01-01 | Stop reason: HOSPADM

## 2022-01-01 RX ORDER — CALCIUM CARBONATE 200(500)MG
500 TABLET,CHEWABLE ORAL 2 TIMES DAILY PRN
Status: DISCONTINUED | OUTPATIENT
Start: 2022-01-01 | End: 2022-01-01 | Stop reason: HOSPADM

## 2022-01-01 RX ORDER — IPRATROPIUM BROMIDE AND ALBUTEROL SULFATE 2.5; .5 MG/3ML; MG/3ML
3 SOLUTION RESPIRATORY (INHALATION) EVERY 6 HOURS
Status: DISCONTINUED | OUTPATIENT
Start: 2022-01-01 | End: 2022-01-01 | Stop reason: HOSPADM

## 2022-01-01 RX ORDER — LANCETS 28 GAUGE
EACH MISCELLANEOUS
Qty: 100 EACH | Refills: 3 | Status: SHIPPED | OUTPATIENT
Start: 2022-01-01

## 2022-01-01 RX ORDER — ACETAMINOPHEN 500 MG
10 TABLET ORAL NIGHTLY
COMMUNITY

## 2022-01-01 RX ORDER — NIFEDIPINE 30 MG/1
30 TABLET, EXTENDED RELEASE ORAL DAILY
Status: DISCONTINUED | OUTPATIENT
Start: 2022-01-01 | End: 2022-01-01

## 2022-01-01 RX ORDER — POTASSIUM CHLORIDE 20 MEQ/1
20 TABLET, EXTENDED RELEASE ORAL DAILY
Status: DISCONTINUED | OUTPATIENT
Start: 2022-01-01 | End: 2022-01-01

## 2022-01-01 RX ORDER — POTASSIUM CHLORIDE 20 MEQ/1
20 TABLET, EXTENDED RELEASE ORAL
Status: DISCONTINUED | OUTPATIENT
Start: 2022-01-01 | End: 2022-01-01 | Stop reason: HOSPADM

## 2022-01-01 RX ORDER — ONDANSETRON 8 MG/1
8 TABLET, ORALLY DISINTEGRATING ORAL EVERY 8 HOURS PRN
Qty: 30 TABLET | Refills: 3 | Status: SHIPPED | OUTPATIENT
Start: 2022-01-01

## 2022-01-01 RX ORDER — SODIUM CHLORIDE 0.9 % (FLUSH) 0.9 %
10 SYRINGE (ML) INJECTION EVERY 12 HOURS PRN
Status: DISCONTINUED | OUTPATIENT
Start: 2022-01-01 | End: 2022-01-01 | Stop reason: HOSPADM

## 2022-01-01 RX ORDER — ACETAMINOPHEN 325 MG/1
650 TABLET ORAL
Status: CANCELLED | OUTPATIENT
Start: 2022-01-01

## 2022-01-01 RX ORDER — TALC
10 POWDER (GRAM) TOPICAL NIGHTLY
Status: DISCONTINUED | OUTPATIENT
Start: 2022-01-01 | End: 2022-01-01 | Stop reason: HOSPADM

## 2022-01-01 RX ORDER — ACETAMINOPHEN 325 MG/1
650 TABLET ORAL ONCE AS NEEDED
Status: COMPLETED | OUTPATIENT
Start: 2022-01-01 | End: 2022-01-01

## 2022-01-01 RX ORDER — MUPIROCIN 20 MG/G
OINTMENT TOPICAL 2 TIMES DAILY
Status: DISCONTINUED | OUTPATIENT
Start: 2022-01-01 | End: 2022-01-01 | Stop reason: HOSPADM

## 2022-01-01 RX ORDER — SODIUM,POTASSIUM PHOSPHATES 280-250MG
1 POWDER IN PACKET (EA) ORAL EVERY 4 HOURS PRN
Status: DISCONTINUED | OUTPATIENT
Start: 2022-01-01 | End: 2022-01-01 | Stop reason: HOSPADM

## 2022-01-01 RX ORDER — HEPARIN 100 UNIT/ML
300 SYRINGE INTRAVENOUS ONCE AS NEEDED
Status: COMPLETED | OUTPATIENT
Start: 2022-01-01 | End: 2022-01-01

## 2022-01-01 RX ORDER — HEPARIN 100 UNIT/ML
300 SYRINGE INTRAVENOUS
Status: DISCONTINUED | OUTPATIENT
Start: 2022-01-01 | End: 2022-01-01 | Stop reason: HOSPADM

## 2022-01-01 RX ORDER — GLUCAGON 1 MG
1 KIT INJECTION
Status: DISCONTINUED | OUTPATIENT
Start: 2022-01-01 | End: 2022-01-01 | Stop reason: HOSPADM

## 2022-01-01 RX ORDER — TALC
6 POWDER (GRAM) TOPICAL NIGHTLY PRN
Status: DISCONTINUED | OUTPATIENT
Start: 2022-01-01 | End: 2022-01-01 | Stop reason: HOSPADM

## 2022-01-01 RX ORDER — METFORMIN HYDROCHLORIDE 500 MG/1
500 TABLET, EXTENDED RELEASE ORAL
Qty: 90 TABLET | Refills: 3 | Status: SHIPPED | OUTPATIENT
Start: 2022-01-01 | End: 2023-06-27

## 2022-01-01 RX ORDER — FLUCONAZOLE 200 MG/1
400 TABLET ORAL DAILY
Qty: 60 TABLET | Refills: 6 | Status: SHIPPED | OUTPATIENT
Start: 2022-01-01

## 2022-01-01 RX ORDER — HYDROCORTISONE 25 MG/G
CREAM TOPICAL 2 TIMES DAILY
Status: DISCONTINUED | OUTPATIENT
Start: 2022-01-01 | End: 2022-01-01 | Stop reason: HOSPADM

## 2022-01-01 RX ORDER — ATORVASTATIN CALCIUM 40 MG/1
40 TABLET, FILM COATED ORAL DAILY
Status: DISCONTINUED | OUTPATIENT
Start: 2022-01-01 | End: 2022-01-01 | Stop reason: HOSPADM

## 2022-01-01 RX ORDER — HYDROCODONE BITARTRATE AND ACETAMINOPHEN 500; 5 MG/1; MG/1
TABLET ORAL
Status: DISCONTINUED | OUTPATIENT
Start: 2022-01-01 | End: 2022-01-01

## 2022-01-01 RX ORDER — LISINOPRIL 20 MG/1
20 TABLET ORAL DAILY
Qty: 90 TABLET | Refills: 3
Start: 2022-01-01 | End: 2023-06-02

## 2022-01-01 RX ORDER — HEPARIN 100 UNIT/ML
500 SYRINGE INTRAVENOUS
Status: CANCELLED | OUTPATIENT
Start: 2022-01-01

## 2022-01-01 RX ORDER — FLUCONAZOLE 200 MG/1
400 TABLET ORAL DAILY
Status: DISCONTINUED | OUTPATIENT
Start: 2022-01-01 | End: 2022-01-01 | Stop reason: HOSPADM

## 2022-01-01 RX ORDER — NIFEDIPINE 30 MG/1
60 TABLET, EXTENDED RELEASE ORAL DAILY
Status: DISCONTINUED | OUTPATIENT
Start: 2022-01-01 | End: 2022-01-01 | Stop reason: HOSPADM

## 2022-01-01 RX ORDER — LOPERAMIDE HYDROCHLORIDE 2 MG/1
4 CAPSULE ORAL DAILY PRN
COMMUNITY

## 2022-01-01 RX ORDER — PREDNISONE 20 MG/1
20 TABLET ORAL DAILY
Status: DISCONTINUED | OUTPATIENT
Start: 2022-07-09 | End: 2022-01-01 | Stop reason: HOSPADM

## 2022-01-01 RX ORDER — COLCHICINE 0.6 MG/1
0.6 TABLET, FILM COATED ORAL DAILY
Status: DISCONTINUED | OUTPATIENT
Start: 2022-01-01 | End: 2022-01-01 | Stop reason: HOSPADM

## 2022-01-01 RX ORDER — HYDROCODONE BITARTRATE AND ACETAMINOPHEN 500; 5 MG/1; MG/1
TABLET ORAL ONCE
Status: COMPLETED | OUTPATIENT
Start: 2022-01-01 | End: 2022-01-01

## 2022-01-01 RX ORDER — HYDROCODONE BITARTRATE AND ACETAMINOPHEN 500; 5 MG/1; MG/1
TABLET ORAL
Status: DISCONTINUED | OUTPATIENT
Start: 2022-01-01 | End: 2022-01-01 | Stop reason: HOSPADM

## 2022-01-01 RX ORDER — CETIRIZINE HYDROCHLORIDE 10 MG/1
10 TABLET ORAL DAILY
Status: DISCONTINUED | OUTPATIENT
Start: 2022-01-01 | End: 2022-01-01 | Stop reason: HOSPADM

## 2022-01-01 RX ORDER — DAPSONE 100 MG/1
100 TABLET ORAL DAILY
Qty: 30 TABLET | Refills: 3 | Status: SHIPPED | OUTPATIENT
Start: 2022-01-01 | End: 2022-09-16

## 2022-01-01 RX ORDER — INSULIN ASPART 100 [IU]/ML
1-10 INJECTION, SOLUTION INTRAVENOUS; SUBCUTANEOUS
Status: CANCELLED | OUTPATIENT
Start: 2022-01-01

## 2022-01-01 RX ORDER — ACETAMINOPHEN 325 MG/1
650 TABLET ORAL ONCE
Status: COMPLETED | OUTPATIENT
Start: 2022-01-01 | End: 2022-01-01

## 2022-01-01 RX ORDER — CETIRIZINE HYDROCHLORIDE 10 MG/1
10 TABLET ORAL DAILY PRN
Status: DISCONTINUED | OUTPATIENT
Start: 2022-01-01 | End: 2022-01-01 | Stop reason: HOSPADM

## 2022-01-01 RX ORDER — FENTANYL CITRATE 50 UG/ML
INJECTION, SOLUTION INTRAMUSCULAR; INTRAVENOUS CODE/TRAUMA/SEDATION MEDICATION
Status: COMPLETED | OUTPATIENT
Start: 2022-01-01 | End: 2022-01-01

## 2022-01-01 RX ORDER — MUPIROCIN 20 MG/G
OINTMENT TOPICAL 2 TIMES DAILY
Status: COMPLETED | OUTPATIENT
Start: 2022-01-01 | End: 2022-01-01

## 2022-01-01 RX ORDER — INSULIN ASPART 100 [IU]/ML
0-5 INJECTION, SOLUTION INTRAVENOUS; SUBCUTANEOUS
Status: DISCONTINUED | OUTPATIENT
Start: 2022-01-01 | End: 2022-01-01 | Stop reason: HOSPADM

## 2022-01-01 RX ORDER — PREDNISONE 10 MG/1
10 TABLET ORAL DAILY
Status: DISCONTINUED | OUTPATIENT
Start: 2022-07-22 | End: 2022-01-01 | Stop reason: HOSPADM

## 2022-01-01 RX ORDER — CARVEDILOL 25 MG/1
25 TABLET ORAL 2 TIMES DAILY WITH MEALS
Status: DISCONTINUED | OUTPATIENT
Start: 2022-01-01 | End: 2022-01-01 | Stop reason: HOSPADM

## 2022-01-01 RX ORDER — LANOLIN ALCOHOL/MO/W.PET/CERES
400 CREAM (GRAM) TOPICAL EVERY 4 HOURS PRN
Status: DISCONTINUED | OUTPATIENT
Start: 2022-01-01 | End: 2022-01-01 | Stop reason: HOSPADM

## 2022-01-01 RX ORDER — SUCRALFATE 1 G/10ML
1 SUSPENSION ORAL EVERY 6 HOURS
Status: DISCONTINUED | OUTPATIENT
Start: 2022-01-01 | End: 2022-01-01 | Stop reason: HOSPADM

## 2022-01-01 RX ORDER — GABAPENTIN 100 MG/1
100 CAPSULE ORAL 2 TIMES DAILY
Status: DISCONTINUED | OUTPATIENT
Start: 2022-01-01 | End: 2022-01-01 | Stop reason: HOSPADM

## 2022-01-01 RX ORDER — COLCHICINE 0.6 MG/1
0.6 TABLET, FILM COATED ORAL DAILY
Status: DISCONTINUED | OUTPATIENT
Start: 2022-01-01 | End: 2022-01-01

## 2022-01-01 RX ORDER — NIFEDIPINE 60 MG/1
60 TABLET, EXTENDED RELEASE ORAL DAILY
Status: DISCONTINUED | OUTPATIENT
Start: 2022-01-01 | End: 2022-01-01 | Stop reason: HOSPADM

## 2022-01-01 RX ORDER — CIPROFLOXACIN 500 MG/1
500 TABLET ORAL 2 TIMES DAILY
Status: DISCONTINUED | OUTPATIENT
Start: 2022-01-01 | End: 2022-01-01

## 2022-01-01 RX ORDER — LANOLIN ALCOHOL/MO/W.PET/CERES
800 CREAM (GRAM) TOPICAL 2 TIMES DAILY
Status: DISCONTINUED | OUTPATIENT
Start: 2022-01-01 | End: 2022-01-01 | Stop reason: HOSPADM

## 2022-01-01 RX ORDER — HEPARIN 100 UNIT/ML
500 SYRINGE INTRAVENOUS
Status: DISCONTINUED | OUTPATIENT
Start: 2022-01-01 | End: 2022-01-01 | Stop reason: HOSPADM

## 2022-01-01 RX ORDER — ALLOPURINOL 100 MG/1
100 TABLET ORAL DAILY
Qty: 30 TABLET | Refills: 3 | Status: SHIPPED | OUTPATIENT
Start: 2022-01-01

## 2022-01-01 RX ORDER — DEFERASIROX 500 MG/1
20 TABLET, FOR SUSPENSION ORAL
Status: DISCONTINUED | OUTPATIENT
Start: 2022-01-01 | End: 2022-01-01 | Stop reason: HOSPADM

## 2022-01-01 RX ORDER — CETIRIZINE HYDROCHLORIDE 5 MG/1
10 TABLET ORAL DAILY
Status: DISCONTINUED | OUTPATIENT
Start: 2022-01-01 | End: 2022-01-01

## 2022-01-01 RX ORDER — KETOTIFEN FUMARATE 0.35 MG/ML
2-3 SOLUTION/ DROPS OPHTHALMIC DAILY PRN
COMMUNITY

## 2022-01-01 RX ORDER — SODIUM CHLORIDE 9 MG/ML
INJECTION, SOLUTION INTRAVENOUS CONTINUOUS
Status: DISCONTINUED | OUTPATIENT
Start: 2022-01-01 | End: 2022-01-01

## 2022-01-01 RX ORDER — TRAMADOL HYDROCHLORIDE 50 MG/1
50 TABLET ORAL EVERY 6 HOURS PRN
Status: DISCONTINUED | OUTPATIENT
Start: 2022-01-01 | End: 2022-01-01 | Stop reason: HOSPADM

## 2022-01-01 RX ORDER — ONDANSETRON 4 MG/1
4 TABLET, ORALLY DISINTEGRATING ORAL EVERY 6 HOURS PRN
Status: DISCONTINUED | OUTPATIENT
Start: 2022-01-01 | End: 2022-01-01 | Stop reason: HOSPADM

## 2022-01-01 RX ORDER — HYDRALAZINE HYDROCHLORIDE 20 MG/ML
10 INJECTION INTRAMUSCULAR; INTRAVENOUS EVERY 6 HOURS PRN
Status: DISCONTINUED | OUTPATIENT
Start: 2022-01-01 | End: 2022-01-01 | Stop reason: HOSPADM

## 2022-01-01 RX ORDER — ENOXAPARIN SODIUM 100 MG/ML
40 INJECTION SUBCUTANEOUS EVERY 24 HOURS
Status: DISCONTINUED | OUTPATIENT
Start: 2022-01-01 | End: 2022-01-01 | Stop reason: HOSPADM

## 2022-01-01 RX ORDER — ONDANSETRON 2 MG/ML
4 INJECTION INTRAMUSCULAR; INTRAVENOUS
Status: COMPLETED | OUTPATIENT
Start: 2022-01-01 | End: 2022-01-01

## 2022-01-01 RX ORDER — LISINOPRIL 20 MG/1
40 TABLET ORAL DAILY
Status: DISCONTINUED | OUTPATIENT
Start: 2022-01-01 | End: 2022-01-01 | Stop reason: HOSPADM

## 2022-01-01 RX ORDER — CARVEDILOL 25 MG/1
25 TABLET ORAL 2 TIMES DAILY WITH MEALS
Qty: 60 TABLET | Refills: 11 | Status: SHIPPED | OUTPATIENT
Start: 2022-01-01 | End: 2023-05-19

## 2022-01-01 RX ORDER — PREDNISONE 20 MG/1
60 TABLET ORAL DAILY
Status: COMPLETED | OUTPATIENT
Start: 2022-01-01 | End: 2022-01-01

## 2022-01-01 RX ORDER — PREDNISONE 10 MG/1
10 TABLET ORAL DAILY
Status: DISCONTINUED | OUTPATIENT
Start: 2022-07-23 | End: 2022-01-01 | Stop reason: HOSPADM

## 2022-01-01 RX ORDER — CIPROFLOXACIN 500 MG/1
500 TABLET ORAL EVERY 12 HOURS
Status: DISCONTINUED | OUTPATIENT
Start: 2022-01-01 | End: 2022-01-01

## 2022-01-01 RX ORDER — LANOLIN ALCOHOL/MO/W.PET/CERES
400 CREAM (GRAM) TOPICAL EVERY 4 HOURS PRN
Status: DISCONTINUED | OUTPATIENT
Start: 2022-01-01 | End: 2022-01-01

## 2022-01-01 RX ORDER — LANOLIN ALCOHOL/MO/W.PET/CERES
800 CREAM (GRAM) TOPICAL EVERY 4 HOURS PRN
Status: DISCONTINUED | OUTPATIENT
Start: 2022-01-01 | End: 2022-01-01

## 2022-01-01 RX ORDER — MAGNESIUM SULFATE 1 G/100ML
1 INJECTION INTRAVENOUS ONCE
Status: DISCONTINUED | OUTPATIENT
Start: 2022-01-01 | End: 2022-01-01

## 2022-01-01 RX ORDER — FOLIC ACID 1 MG/1
1 TABLET ORAL DAILY
Qty: 360 TABLET | Refills: 0
Start: 2022-01-01 | End: 2023-06-17

## 2022-01-01 RX ORDER — PANTOPRAZOLE SODIUM 40 MG/1
40 TABLET, DELAYED RELEASE ORAL DAILY
Qty: 30 TABLET | Refills: 3 | Status: SHIPPED | OUTPATIENT
Start: 2022-01-01

## 2022-01-01 RX ORDER — DIPHENHYDRAMINE HCL 25 MG
25 CAPSULE ORAL ONCE AS NEEDED
Status: DISCONTINUED | OUTPATIENT
Start: 2022-01-01 | End: 2022-01-01 | Stop reason: HOSPADM

## 2022-01-01 RX ORDER — DIPHENHYDRAMINE HCL 25 MG
25 CAPSULE ORAL ONCE
Status: COMPLETED | OUTPATIENT
Start: 2022-01-01 | End: 2022-01-01

## 2022-01-01 RX ORDER — LISINOPRIL 40 MG/1
40 TABLET ORAL DAILY
Qty: 90 TABLET | Refills: 3 | Status: ON HOLD | OUTPATIENT
Start: 2022-01-01 | End: 2022-01-01 | Stop reason: HOSPADM

## 2022-01-01 RX ORDER — LEVOFLOXACIN 750 MG/1
750 TABLET ORAL DAILY
Status: COMPLETED | OUTPATIENT
Start: 2022-01-01 | End: 2022-01-01

## 2022-01-01 RX ORDER — LANOLIN ALCOHOL/MO/W.PET/CERES
400 CREAM (GRAM) TOPICAL DAILY
Qty: 30 TABLET | Refills: 11 | Status: ON HOLD | OUTPATIENT
Start: 2022-01-01 | End: 2022-01-01 | Stop reason: HOSPADM

## 2022-01-01 RX ORDER — HYDROCHLOROTHIAZIDE 25 MG/1
25 TABLET ORAL DAILY
Status: DISCONTINUED | OUTPATIENT
Start: 2022-01-01 | End: 2022-01-01

## 2022-01-01 RX ORDER — FLUCONAZOLE 200 MG/1
400 TABLET ORAL DAILY
Status: DISCONTINUED | OUTPATIENT
Start: 2022-01-01 | End: 2022-01-01

## 2022-01-01 RX ORDER — SODIUM,POTASSIUM PHOSPHATES 280-250MG
2 POWDER IN PACKET (EA) ORAL EVERY 4 HOURS PRN
Status: DISCONTINUED | OUTPATIENT
Start: 2022-01-01 | End: 2022-01-01 | Stop reason: HOSPADM

## 2022-01-01 RX ORDER — NOREPINEPHRINE BITARTRATE/D5W 4MG/250ML
0.05 PLASTIC BAG, INJECTION (ML) INTRAVENOUS CONTINUOUS
Status: DISCONTINUED | OUTPATIENT
Start: 2022-01-01 | End: 2022-01-01

## 2022-01-01 RX ORDER — CARVEDILOL 12.5 MG/1
12.5 TABLET ORAL
Status: COMPLETED | OUTPATIENT
Start: 2022-01-01 | End: 2022-01-01

## 2022-01-01 RX ORDER — MAGNESIUM SULFATE HEPTAHYDRATE 40 MG/ML
2 INJECTION, SOLUTION INTRAVENOUS
Status: COMPLETED | OUTPATIENT
Start: 2022-01-01 | End: 2022-01-01

## 2022-01-01 RX ORDER — CETIRIZINE HYDROCHLORIDE 5 MG/1
10 TABLET ORAL DAILY
Status: DISCONTINUED | OUTPATIENT
Start: 2022-01-01 | End: 2022-01-01 | Stop reason: HOSPADM

## 2022-01-01 RX ORDER — POLYETHYLENE GLYCOL 3350 17 G/17G
17 POWDER, FOR SOLUTION ORAL DAILY PRN
COMMUNITY

## 2022-01-01 RX ORDER — TALC
10 POWDER (GRAM) TOPICAL DAILY
Status: DISCONTINUED | OUTPATIENT
Start: 2022-01-01 | End: 2022-01-01

## 2022-01-01 RX ORDER — DEFERASIROX 500 MG/1
20 TABLET, FOR SUSPENSION ORAL
Qty: 90 TABLET | Refills: 0 | Status: ON HOLD | OUTPATIENT
Start: 2022-01-01 | End: 2022-01-01 | Stop reason: SDUPTHER

## 2022-01-01 RX ORDER — DIPHENHYDRAMINE HYDROCHLORIDE 50 MG/ML
25 INJECTION INTRAMUSCULAR; INTRAVENOUS
Status: DISCONTINUED | OUTPATIENT
Start: 2022-01-01 | End: 2022-01-01

## 2022-01-01 RX ORDER — IPRATROPIUM BROMIDE AND ALBUTEROL SULFATE 2.5; .5 MG/3ML; MG/3ML
SOLUTION RESPIRATORY (INHALATION)
Status: DISPENSED
Start: 2022-01-01 | End: 2022-01-01

## 2022-01-01 RX ORDER — SODIUM CHLORIDE 0.9 % (FLUSH) 0.9 %
10 SYRINGE (ML) INJECTION
Status: DISCONTINUED | OUTPATIENT
Start: 2022-01-01 | End: 2022-01-01 | Stop reason: HOSPADM

## 2022-01-01 RX ORDER — CIPROFLOXACIN 500 MG/1
500 TABLET ORAL EVERY 12 HOURS
Qty: 180 TABLET | Refills: 0
Start: 2022-01-01 | End: 2022-09-13

## 2022-01-01 RX ORDER — ACETAMINOPHEN 325 MG/1
TABLET ORAL
Status: DISPENSED
Start: 2022-01-01 | End: 2022-01-01

## 2022-01-01 RX ORDER — CITALOPRAM 20 MG/1
20 TABLET, FILM COATED ORAL DAILY
Qty: 30 TABLET | Refills: 5 | Status: ON HOLD | OUTPATIENT
Start: 2022-01-01 | End: 2022-01-01 | Stop reason: HOSPADM

## 2022-01-01 RX ORDER — HEPARIN 100 UNIT/ML
300 SYRINGE INTRAVENOUS ONCE AS NEEDED
Status: DISCONTINUED | OUTPATIENT
Start: 2022-01-01 | End: 2022-01-01 | Stop reason: HOSPADM

## 2022-01-01 RX ORDER — PREDNISONE 10 MG/1
TABLET ORAL
Qty: 262 TABLET | Refills: 0 | Status: SHIPPED | OUTPATIENT
Start: 2022-01-01 | End: 2022-08-12

## 2022-01-01 RX ORDER — NICOTINE 7MG/24HR
1 PATCH, TRANSDERMAL 24 HOURS TRANSDERMAL DAILY
Status: DISCONTINUED | OUTPATIENT
Start: 2022-01-01 | End: 2022-01-01 | Stop reason: HOSPADM

## 2022-01-01 RX ORDER — CHLORTHALIDONE 25 MG/1
25 TABLET ORAL DAILY
Qty: 30 TABLET | Refills: 11 | Status: SHIPPED | OUTPATIENT
Start: 2022-01-01 | End: 2023-05-19

## 2022-01-01 RX ORDER — ONDANSETRON 2 MG/ML
4 INJECTION INTRAMUSCULAR; INTRAVENOUS
Status: DISCONTINUED | OUTPATIENT
Start: 2022-01-01 | End: 2022-01-01

## 2022-01-01 RX ORDER — LEVOFLOXACIN 750 MG/1
750 TABLET ORAL DAILY
Qty: 3 TABLET | Refills: 0 | Status: ON HOLD | OUTPATIENT
Start: 2022-01-01 | End: 2022-01-01 | Stop reason: HOSPADM

## 2022-01-01 RX ORDER — LOPERAMIDE HYDROCHLORIDE 2 MG/1
4 CAPSULE ORAL DAILY PRN
Status: DISCONTINUED | OUTPATIENT
Start: 2022-01-01 | End: 2022-01-01 | Stop reason: HOSPADM

## 2022-01-01 RX ORDER — CETIRIZINE HYDROCHLORIDE 5 MG/1
10 TABLET ORAL DAILY PRN
Status: DISCONTINUED | OUTPATIENT
Start: 2022-01-01 | End: 2022-01-01

## 2022-01-01 RX ORDER — DOPAMINE HYDROCHLORIDE 160 MG/100ML
10 INJECTION, SOLUTION INTRAVENOUS CONTINUOUS
Status: DISCONTINUED | OUTPATIENT
Start: 2022-01-01 | End: 2022-01-01

## 2022-01-01 RX ORDER — DEFERASIROX 500 MG/1
1500 TABLET, FOR SUSPENSION ORAL
Status: DISCONTINUED | OUTPATIENT
Start: 2022-01-01 | End: 2022-01-01 | Stop reason: HOSPADM

## 2022-01-01 RX ORDER — DOPAMINE HYDROCHLORIDE 160 MG/100ML
INJECTION, SOLUTION INTRAVENOUS
Status: COMPLETED
Start: 2022-01-01 | End: 2022-01-01

## 2022-01-01 RX ORDER — CHLORTHALIDONE 25 MG/1
25 TABLET ORAL DAILY
Status: DISCONTINUED | OUTPATIENT
Start: 2022-01-01 | End: 2022-01-01

## 2022-01-01 RX ORDER — FUROSEMIDE 10 MG/ML
40 INJECTION INTRAMUSCULAR; INTRAVENOUS ONCE
Status: COMPLETED | OUTPATIENT
Start: 2022-01-01 | End: 2022-01-01

## 2022-01-01 RX ORDER — TALC
9 POWDER (GRAM) TOPICAL NIGHTLY
Status: DISCONTINUED | OUTPATIENT
Start: 2022-01-01 | End: 2022-01-01 | Stop reason: HOSPADM

## 2022-01-01 RX ORDER — DICLOFENAC SODIUM 10 MG/G
2 GEL TOPICAL 2 TIMES DAILY
Status: DISCONTINUED | OUTPATIENT
Start: 2022-01-01 | End: 2022-01-01 | Stop reason: HOSPADM

## 2022-01-01 RX ORDER — ATROPINE SULFATE 0.1 MG/ML
1 INJECTION INTRAVENOUS ONCE
Status: COMPLETED | OUTPATIENT
Start: 2022-01-01 | End: 2022-01-01

## 2022-01-01 RX ORDER — AZITHROMYCIN 250 MG/1
500 TABLET, FILM COATED ORAL
Status: COMPLETED | OUTPATIENT
Start: 2022-01-01 | End: 2022-01-01

## 2022-01-01 RX ORDER — GABAPENTIN 100 MG/1
100 CAPSULE ORAL 2 TIMES DAILY
Qty: 60 CAPSULE | Refills: 11
Start: 2022-01-01 | End: 2023-06-02

## 2022-01-01 RX ORDER — FOLIC ACID 1 MG/1
1 TABLET ORAL DAILY
Status: DISCONTINUED | OUTPATIENT
Start: 2022-01-01 | End: 2022-01-01 | Stop reason: HOSPADM

## 2022-01-01 RX ORDER — CARVEDILOL 12.5 MG/1
12.5 TABLET ORAL 2 TIMES DAILY WITH MEALS
Status: DISCONTINUED | OUTPATIENT
Start: 2022-01-01 | End: 2022-01-01 | Stop reason: HOSPADM

## 2022-01-01 RX ORDER — ATROPINE SULFATE 1 MG/ML
1 INJECTION, SOLUTION INTRAMUSCULAR; INTRAVENOUS; SUBCUTANEOUS ONCE
Status: DISCONTINUED | OUTPATIENT
Start: 2022-01-01 | End: 2022-01-01

## 2022-01-01 RX ORDER — LORATADINE 10 MG/1
TABLET ORAL
Status: ON HOLD | COMMUNITY
Start: 2022-01-01 | End: 2022-01-01 | Stop reason: HOSPADM

## 2022-01-01 RX ORDER — DEXTROMETHORPHAN HYDROBROMIDE, GUAIFENESIN 5; 100 MG/5ML; MG/5ML
1300 LIQUID ORAL DAILY PRN
Status: ON HOLD | COMMUNITY
End: 2022-01-01 | Stop reason: HOSPADM

## 2022-01-01 RX ORDER — FLASH GLUCOSE SENSOR
KIT MISCELLANEOUS
Qty: 2 KIT | Refills: 11 | Status: SHIPPED | OUTPATIENT
Start: 2022-01-01 | End: 2022-01-01

## 2022-01-01 RX ORDER — ACETAMINOPHEN 325 MG/1
650 TABLET ORAL
Status: DISPENSED | OUTPATIENT
Start: 2022-01-01 | End: 2022-01-01

## 2022-01-01 RX ORDER — LANOLIN ALCOHOL/MO/W.PET/CERES
800 CREAM (GRAM) TOPICAL EVERY 4 HOURS PRN
Status: DISCONTINUED | OUTPATIENT
Start: 2022-01-01 | End: 2022-01-01 | Stop reason: HOSPADM

## 2022-01-01 RX ORDER — LIDOCAINE HYDROCHLORIDE 20 MG/ML
INJECTION, SOLUTION INFILTRATION; PERINEURAL CODE/TRAUMA/SEDATION MEDICATION
Status: COMPLETED | OUTPATIENT
Start: 2022-01-01 | End: 2022-01-01

## 2022-01-01 RX ORDER — MAGNESIUM SULFATE 1 G/100ML
1 INJECTION INTRAVENOUS ONCE
Status: COMPLETED | OUTPATIENT
Start: 2022-01-01 | End: 2022-01-01

## 2022-01-01 RX ORDER — LANOLIN ALCOHOL/MO/W.PET/CERES
400 CREAM (GRAM) TOPICAL 2 TIMES DAILY
Status: COMPLETED | OUTPATIENT
Start: 2022-01-01 | End: 2022-01-01

## 2022-01-01 RX ORDER — PREDNISONE 5 MG/1
5 TABLET ORAL DAILY
Status: DISCONTINUED | OUTPATIENT
Start: 2022-08-05 | End: 2022-01-01 | Stop reason: HOSPADM

## 2022-01-01 RX ORDER — DEFERASIROX 500 MG/1
20 TABLET, FOR SUSPENSION ORAL
Qty: 90 TABLET | Refills: 0 | Status: SHIPPED | OUTPATIENT
Start: 2022-01-01 | End: 2022-01-01

## 2022-01-01 RX ORDER — LIDOCAINE HYDROCHLORIDE 10 MG/ML
INJECTION INFILTRATION; PERINEURAL CODE/TRAUMA/SEDATION MEDICATION
Status: COMPLETED | OUTPATIENT
Start: 2022-01-01 | End: 2022-01-01

## 2022-01-01 RX ORDER — MIDAZOLAM HYDROCHLORIDE 5 MG/ML
INJECTION INTRAMUSCULAR; INTRAVENOUS CODE/TRAUMA/SEDATION MEDICATION
Status: COMPLETED | OUTPATIENT
Start: 2022-01-01 | End: 2022-01-01

## 2022-01-01 RX ORDER — NOREPINEPHRINE BITARTRATE/D5W 4MG/250ML
PLASTIC BAG, INJECTION (ML) INTRAVENOUS
Status: COMPLETED
Start: 2022-01-01 | End: 2022-01-01

## 2022-01-01 RX ORDER — FLASH GLUCOSE SCANNING READER
EACH MISCELLANEOUS
Qty: 1 EACH | Refills: 0 | Status: SHIPPED | OUTPATIENT
Start: 2022-01-01 | End: 2022-01-01

## 2022-01-01 RX ORDER — IPRATROPIUM BROMIDE AND ALBUTEROL SULFATE 2.5; .5 MG/3ML; MG/3ML
3 SOLUTION RESPIRATORY (INHALATION)
Status: DISCONTINUED | OUTPATIENT
Start: 2022-01-01 | End: 2022-01-01 | Stop reason: HOSPADM

## 2022-01-01 RX ORDER — LISINOPRIL AND HYDROCHLOROTHIAZIDE 10; 12.5 MG/1; MG/1
2 TABLET ORAL DAILY
Refills: 3 | Status: DISCONTINUED | OUTPATIENT
Start: 2022-01-01 | End: 2022-01-01

## 2022-01-01 RX ORDER — MIRTAZAPINE 7.5 MG/1
7.5 TABLET, FILM COATED ORAL NIGHTLY
Qty: 30 TABLET | Refills: 11 | Status: SHIPPED | OUTPATIENT
Start: 2022-01-01 | End: 2023-05-19

## 2022-01-01 RX ORDER — POTASSIUM CHLORIDE 20 MEQ/1
40 TABLET, EXTENDED RELEASE ORAL ONCE
Status: COMPLETED | OUTPATIENT
Start: 2022-01-01 | End: 2022-01-01

## 2022-01-01 RX ADMIN — CITALOPRAM HYDROBROMIDE 20 MG: 20 TABLET ORAL at 08:06

## 2022-01-01 RX ADMIN — CETIRIZINE HYDROCHLORIDE 10 MG: 5 TABLET, FILM COATED ORAL at 09:05

## 2022-01-01 RX ADMIN — SENNOSIDES AND DOCUSATE SODIUM 1 TABLET: 50; 8.6 TABLET ORAL at 08:06

## 2022-01-01 RX ADMIN — Medication 800 MG: at 09:06

## 2022-01-01 RX ADMIN — CEFEPIME HYDROCHLORIDE 2 G: 2 INJECTION, SOLUTION INTRAVENOUS at 10:05

## 2022-01-01 RX ADMIN — PANTOPRAZOLE SODIUM 40 MG: 40 TABLET, DELAYED RELEASE ORAL at 09:06

## 2022-01-01 RX ADMIN — MIRTAZAPINE 7.5 MG: 7.5 TABLET, FILM COATED ORAL at 10:05

## 2022-01-01 RX ADMIN — ACYCLOVIR 400 MG: 200 CAPSULE ORAL at 09:02

## 2022-01-01 RX ADMIN — MELATONIN TAB 3 MG 9 MG: 3 TAB at 09:06

## 2022-01-01 RX ADMIN — CARVEDILOL 25 MG: 25 TABLET, FILM COATED ORAL at 08:06

## 2022-01-01 RX ADMIN — DIPHENHYDRAMINE HYDROCHLORIDE 25 MG: 25 CAPSULE ORAL at 01:04

## 2022-01-01 RX ADMIN — CARVEDILOL 25 MG: 25 TABLET, FILM COATED ORAL at 09:06

## 2022-01-01 RX ADMIN — CETIRIZINE HYDROCHLORIDE 10 MG: 10 TABLET, FILM COATED ORAL at 08:05

## 2022-01-01 RX ADMIN — MUPIROCIN: 20 OINTMENT TOPICAL at 09:04

## 2022-01-01 RX ADMIN — Medication 400 MG: at 04:04

## 2022-01-01 RX ADMIN — CEFEPIME HYDROCHLORIDE 2 G: 2 INJECTION, SOLUTION INTRAVENOUS at 03:02

## 2022-01-01 RX ADMIN — NIFEDIPINE 60 MG: 30 TABLET, FILM COATED, EXTENDED RELEASE ORAL at 09:06

## 2022-01-01 RX ADMIN — MUPIROCIN: 20 OINTMENT TOPICAL at 10:05

## 2022-01-01 RX ADMIN — HYDROCORTISONE: 25 CREAM TOPICAL at 09:06

## 2022-01-01 RX ADMIN — CETIRIZINE HYDROCHLORIDE 10 MG: 10 TABLET, FILM COATED ORAL at 09:05

## 2022-01-01 RX ADMIN — LISINOPRIL 20 MG: 20 TABLET ORAL at 08:06

## 2022-01-01 RX ADMIN — SODIUM CHLORIDE: 0.9 INJECTION, SOLUTION INTRAVENOUS at 12:05

## 2022-01-01 RX ADMIN — ALLOPURINOL 100 MG: 100 TABLET ORAL at 08:05

## 2022-01-01 RX ADMIN — Medication 800 MG: at 08:06

## 2022-01-01 RX ADMIN — HYDRALAZINE HYDROCHLORIDE 25 MG: 25 TABLET, FILM COATED ORAL at 05:04

## 2022-01-01 RX ADMIN — DIPHENHYDRAMINE HYDROCHLORIDE 25 MG: 25 CAPSULE ORAL at 01:01

## 2022-01-01 RX ADMIN — CARVEDILOL 25 MG: 25 TABLET, FILM COATED ORAL at 06:05

## 2022-01-01 RX ADMIN — ACYCLOVIR 400 MG: 200 CAPSULE ORAL at 08:05

## 2022-01-01 RX ADMIN — LISINOPRIL AND HYDROCHLOROTHIAZIDE 2 TABLET: 12.5; 1 TABLET ORAL at 09:04

## 2022-01-01 RX ADMIN — LISINOPRIL 20 MG: 20 TABLET ORAL at 10:05

## 2022-01-01 RX ADMIN — FLUCONAZOLE 400 MG: 200 TABLET ORAL at 08:05

## 2022-01-01 RX ADMIN — Medication 400 MG: at 07:05

## 2022-01-01 RX ADMIN — FLUCONAZOLE 400 MG: 200 TABLET ORAL at 08:02

## 2022-01-01 RX ADMIN — ALLOPURINOL 100 MG: 100 TABLET ORAL at 10:05

## 2022-01-01 RX ADMIN — MIRTAZAPINE 7.5 MG: 7.5 TABLET, FILM COATED ORAL at 09:06

## 2022-01-01 RX ADMIN — NIFEDIPINE 60 MG: 30 TABLET, FILM COATED, EXTENDED RELEASE ORAL at 08:05

## 2022-01-01 RX ADMIN — IPRATROPIUM BROMIDE AND ALBUTEROL SULFATE 3 ML: 2.5; .5 SOLUTION RESPIRATORY (INHALATION) at 02:05

## 2022-01-01 RX ADMIN — Medication 400 MG: at 06:05

## 2022-01-01 RX ADMIN — PANTOPRAZOLE SODIUM 40 MG: 40 TABLET, DELAYED RELEASE ORAL at 08:05

## 2022-01-01 RX ADMIN — GABAPENTIN 100 MG: 100 CAPSULE ORAL at 08:06

## 2022-01-01 RX ADMIN — LISINOPRIL 40 MG: 20 TABLET ORAL at 08:05

## 2022-01-01 RX ADMIN — HYDROCORTISONE: 25 CREAM TOPICAL at 08:05

## 2022-01-01 RX ADMIN — PANTOPRAZOLE SODIUM 40 MG: 40 TABLET, DELAYED RELEASE ORAL at 09:05

## 2022-01-01 RX ADMIN — SENNOSIDES AND DOCUSATE SODIUM 1 TABLET: 50; 8.6 TABLET ORAL at 09:06

## 2022-01-01 RX ADMIN — Medication 800 MG: at 01:05

## 2022-01-01 RX ADMIN — MIRTAZAPINE 7.5 MG: 7.5 TABLET, FILM COATED ORAL at 08:05

## 2022-01-01 RX ADMIN — DICLOFENAC SODIUM 2 G: 10 GEL TOPICAL at 09:06

## 2022-01-01 RX ADMIN — Medication 9 MG: at 08:05

## 2022-01-01 RX ADMIN — Medication 400 MG: at 12:04

## 2022-01-01 RX ADMIN — MIDAZOLAM 2 MG: 5 INJECTION INTRAMUSCULAR; INTRAVENOUS at 08:05

## 2022-01-01 RX ADMIN — MUPIROCIN: 20 OINTMENT TOPICAL at 09:05

## 2022-01-01 RX ADMIN — CARVEDILOL 25 MG: 25 TABLET, FILM COATED ORAL at 08:05

## 2022-01-01 RX ADMIN — ACYCLOVIR 400 MG: 200 CAPSULE ORAL at 09:05

## 2022-01-01 RX ADMIN — Medication 9 MG: at 04:02

## 2022-01-01 RX ADMIN — CITALOPRAM HYDROBROMIDE 20 MG: 20 TABLET ORAL at 08:05

## 2022-01-01 RX ADMIN — IPRATROPIUM BROMIDE AND ALBUTEROL SULFATE 3 ML: 2.5; .5 SOLUTION RESPIRATORY (INHALATION) at 12:05

## 2022-01-01 RX ADMIN — Medication 800 MG: at 10:05

## 2022-01-01 RX ADMIN — COLCHICINE 0.6 MG: 0.6 TABLET, FILM COATED ORAL at 10:06

## 2022-01-01 RX ADMIN — Medication 400 MG: at 04:05

## 2022-01-01 RX ADMIN — SODIUM CHLORIDE 1000 ML: 0.9 INJECTION, SOLUTION INTRAVENOUS at 01:03

## 2022-01-01 RX ADMIN — DAPSONE 100 MG: 100 TABLET ORAL at 09:06

## 2022-01-01 RX ADMIN — ALUMINUM HYDROXIDE, MAGNESIUM HYDROXIDE, AND SIMETHICONE 30 ML: 200; 200; 20 SUSPENSION ORAL at 11:04

## 2022-01-01 RX ADMIN — ASPIRIN 81 MG: 81 TABLET, COATED ORAL at 09:05

## 2022-01-01 RX ADMIN — ASPIRIN 81 MG: 81 TABLET, COATED ORAL at 09:04

## 2022-01-01 RX ADMIN — SENNOSIDES AND DOCUSATE SODIUM 1 TABLET: 50; 8.6 TABLET ORAL at 08:05

## 2022-01-01 RX ADMIN — ALLOPURINOL 100 MG: 100 TABLET ORAL at 09:05

## 2022-01-01 RX ADMIN — DICLOFENAC SODIUM 2 G: 10 GEL TOPICAL at 08:06

## 2022-01-01 RX ADMIN — POTASSIUM CHLORIDE 20 MEQ: 20 TABLET, EXTENDED RELEASE ORAL at 06:05

## 2022-01-01 RX ADMIN — MINERAL OIL, PETROLATUM, PHENYLEPHRINE HCL 1 APPLICATOR: 14; 74.9; .25 OINTMENT RECTAL at 08:06

## 2022-01-01 RX ADMIN — CEFEPIME 2 G: 2 INJECTION, POWDER, FOR SOLUTION INTRAVENOUS at 02:05

## 2022-01-01 RX ADMIN — CARVEDILOL 12.5 MG: 12.5 TABLET, FILM COATED ORAL at 04:04

## 2022-01-01 RX ADMIN — LISINOPRIL 40 MG: 20 TABLET ORAL at 09:05

## 2022-01-01 RX ADMIN — INSULIN ASPART 2 UNITS: 100 INJECTION, SOLUTION INTRAVENOUS; SUBCUTANEOUS at 08:05

## 2022-01-01 RX ADMIN — ACYCLOVIR 400 MG: 200 CAPSULE ORAL at 08:04

## 2022-01-01 RX ADMIN — TRAMADOL HYDROCHLORIDE 50 MG: 50 TABLET, COATED ORAL at 08:05

## 2022-01-01 RX ADMIN — INSULIN ASPART 2 UNITS: 100 INJECTION, SOLUTION INTRAVENOUS; SUBCUTANEOUS at 05:06

## 2022-01-01 RX ADMIN — DICLOFENAC SODIUM 2 G: 10 GEL TOPICAL at 09:05

## 2022-01-01 RX ADMIN — FLUCONAZOLE 400 MG: 200 TABLET ORAL at 09:05

## 2022-01-01 RX ADMIN — FENTANYL CITRATE 50 MCG: 50 INJECTION, SOLUTION INTRAMUSCULAR; INTRAVENOUS at 08:05

## 2022-01-01 RX ADMIN — PREDNISONE 60 MG: 20 TABLET ORAL at 04:05

## 2022-01-01 RX ADMIN — IPRATROPIUM BROMIDE AND ALBUTEROL SULFATE 3 ML: 2.5; .5 SOLUTION RESPIRATORY (INHALATION) at 07:05

## 2022-01-01 RX ADMIN — PANTOPRAZOLE SODIUM 40 MG: 40 TABLET, DELAYED RELEASE ORAL at 08:06

## 2022-01-01 RX ADMIN — GUAIFENESIN 600 MG: 600 TABLET, EXTENDED RELEASE ORAL at 08:05

## 2022-01-01 RX ADMIN — ACETAMINOPHEN 650 MG: 325 TABLET ORAL at 11:05

## 2022-01-01 RX ADMIN — CARVEDILOL 25 MG: 25 TABLET, FILM COATED ORAL at 04:05

## 2022-01-01 RX ADMIN — ACETAMINOPHEN 650 MG: 325 TABLET ORAL at 11:04

## 2022-01-01 RX ADMIN — CARVEDILOL 12.5 MG: 12.5 TABLET, FILM COATED ORAL at 08:05

## 2022-01-01 RX ADMIN — ASPIRIN 81 MG: 81 TABLET, COATED ORAL at 08:06

## 2022-01-01 RX ADMIN — LISINOPRIL 40 MG: 20 TABLET ORAL at 10:05

## 2022-01-01 RX ADMIN — ACETAMINOPHEN 650 MG: 325 TABLET ORAL at 02:03

## 2022-01-01 RX ADMIN — NIFEDIPINE 30 MG: 30 TABLET, FILM COATED, EXTENDED RELEASE ORAL at 09:05

## 2022-01-01 RX ADMIN — ACETAMINOPHEN 650 MG: 325 TABLET ORAL at 02:01

## 2022-01-01 RX ADMIN — Medication 400 MG: at 01:05

## 2022-01-01 RX ADMIN — SENNOSIDES AND DOCUSATE SODIUM 1 TABLET: 50; 8.6 TABLET ORAL at 09:05

## 2022-01-01 RX ADMIN — Medication 800 MG: at 08:05

## 2022-01-01 RX ADMIN — DICLOFENAC SODIUM 2 G: 10 GEL TOPICAL at 08:05

## 2022-01-01 RX ADMIN — THERA TABS 1 TABLET: TAB at 08:05

## 2022-01-01 RX ADMIN — ALLOPURINOL 100 MG: 100 TABLET ORAL at 09:06

## 2022-01-01 RX ADMIN — Medication 400 MG: at 05:05

## 2022-01-01 RX ADMIN — PREDNISONE 50 MG: 20 TABLET ORAL at 08:06

## 2022-01-01 RX ADMIN — DOPAMINE HYDROCHLORIDE IN DEXTROSE 10 MCG/KG/MIN: 1.6 INJECTION, SOLUTION INTRAVENOUS at 09:06

## 2022-01-01 RX ADMIN — CEFEPIME HYDROCHLORIDE 2 G: 2 INJECTION, SOLUTION INTRAVENOUS at 06:05

## 2022-01-01 RX ADMIN — DEFERASIROX 1500 MG: 500 TABLET, FOR SUSPENSION ORAL at 05:05

## 2022-01-01 RX ADMIN — CARVEDILOL 25 MG: 25 TABLET, FILM COATED ORAL at 09:05

## 2022-01-01 RX ADMIN — HEPARIN 300 UNITS: 100 SYRINGE at 09:05

## 2022-01-01 RX ADMIN — DEFERASIROX 1500 MG: 500 TABLET, FOR SUSPENSION ORAL at 06:05

## 2022-01-01 RX ADMIN — TUBERCULIN PURIFIED PROTEIN DERIVATIVE 5 UNITS: 5 INJECTION, SOLUTION INTRADERMAL at 06:05

## 2022-01-01 RX ADMIN — LISINOPRIL 20 MG: 20 TABLET ORAL at 09:06

## 2022-01-01 RX ADMIN — MELATONIN TAB 3 MG 9 MG: 3 TAB at 08:05

## 2022-01-01 RX ADMIN — INSULIN ASPART 2 UNITS: 100 INJECTION, SOLUTION INTRAVENOUS; SUBCUTANEOUS at 05:05

## 2022-01-01 RX ADMIN — IPRATROPIUM BROMIDE AND ALBUTEROL SULFATE 3 ML: 2.5; .5 SOLUTION RESPIRATORY (INHALATION) at 08:05

## 2022-01-01 RX ADMIN — IPRATROPIUM BROMIDE AND ALBUTEROL SULFATE 3 ML: 2.5; .5 SOLUTION RESPIRATORY (INHALATION) at 01:05

## 2022-01-01 RX ADMIN — AZACITIDINE 145 MG: 100 INJECTION, POWDER, LYOPHILIZED, FOR SOLUTION INTRAVENOUS; SUBCUTANEOUS at 01:04

## 2022-01-01 RX ADMIN — ASPIRIN 81 MG: 81 TABLET, COATED ORAL at 08:05

## 2022-01-01 RX ADMIN — SODIUM CHLORIDE 1000 ML: 0.9 INJECTION, SOLUTION INTRAVENOUS at 08:06

## 2022-01-01 RX ADMIN — CETIRIZINE HYDROCHLORIDE 10 MG: 5 TABLET, FILM COATED ORAL at 08:05

## 2022-01-01 RX ADMIN — MIRTAZAPINE 7.5 MG: 7.5 TABLET, FILM COATED ORAL at 09:05

## 2022-01-01 RX ADMIN — SUCRALFATE 1 G: 1 SUSPENSION ORAL at 12:04

## 2022-01-01 RX ADMIN — FOLIC ACID 1 MG: 1 TABLET ORAL at 10:06

## 2022-01-01 RX ADMIN — SUCRALFATE 1 G: 1 SUSPENSION ORAL at 05:04

## 2022-01-01 RX ADMIN — Medication 400 MG: at 02:05

## 2022-01-01 RX ADMIN — CHLORTHALIDONE 25 MG: 25 TABLET ORAL at 10:06

## 2022-01-01 RX ADMIN — PANTOPRAZOLE SODIUM 40 MG: 40 TABLET, DELAYED RELEASE ORAL at 10:06

## 2022-01-01 RX ADMIN — CIPROFLOXACIN 500 MG: 500 TABLET, FILM COATED ORAL at 08:05

## 2022-01-01 RX ADMIN — FLUCONAZOLE 400 MG: 200 TABLET ORAL at 02:06

## 2022-01-01 RX ADMIN — CARVEDILOL 25 MG: 25 TABLET, FILM COATED ORAL at 05:06

## 2022-01-01 RX ADMIN — Medication 10 ML: at 05:02

## 2022-01-01 RX ADMIN — TRAMADOL HYDROCHLORIDE 50 MG: 50 TABLET, COATED ORAL at 05:05

## 2022-01-01 RX ADMIN — AZACITIDINE 145 MG: 100 INJECTION, POWDER, LYOPHILIZED, FOR SOLUTION INTRAVENOUS; SUBCUTANEOUS at 03:01

## 2022-01-01 RX ADMIN — ONDANSETRON 8 MG: 4 TABLET, ORALLY DISINTEGRATING ORAL at 11:01

## 2022-01-01 RX ADMIN — CARVEDILOL 25 MG: 25 TABLET, FILM COATED ORAL at 05:05

## 2022-01-01 RX ADMIN — CITALOPRAM HYDROBROMIDE 20 MG: 20 TABLET ORAL at 09:05

## 2022-01-01 RX ADMIN — TOPICAL ANESTHETIC 0.5 ML: 200 SPRAY DENTAL; PERIODONTAL at 08:05

## 2022-01-01 RX ADMIN — SODIUM CHLORIDE: 0.9 INJECTION, SOLUTION INTRAVENOUS at 01:02

## 2022-01-01 RX ADMIN — ALLOPURINOL 100 MG: 100 TABLET ORAL at 02:06

## 2022-01-01 RX ADMIN — DAPSONE 100 MG: 100 TABLET ORAL at 08:05

## 2022-01-01 RX ADMIN — CETIRIZINE HYDROCHLORIDE 10 MG: 5 TABLET, FILM COATED ORAL at 09:06

## 2022-01-01 RX ADMIN — TRAMADOL HYDROCHLORIDE 50 MG: 50 TABLET, COATED ORAL at 08:04

## 2022-01-01 RX ADMIN — MINERAL OIL, PETROLATUM, PHENYLEPHRINE HCL 1 APPLICATOR: 14; 74.9; .25 OINTMENT RECTAL at 02:05

## 2022-01-01 RX ADMIN — Medication 800 MG: at 09:05

## 2022-01-01 RX ADMIN — GABAPENTIN 100 MG: 100 CAPSULE ORAL at 09:06

## 2022-01-01 RX ADMIN — Medication 9 MG: at 09:02

## 2022-01-01 RX ADMIN — MUPIROCIN: 20 OINTMENT TOPICAL at 08:05

## 2022-01-01 RX ADMIN — SODIUM CHLORIDE 1000 ML: 9 INJECTION, SOLUTION INTRAVENOUS at 01:04

## 2022-01-01 RX ADMIN — ENOXAPARIN SODIUM 40 MG: 100 INJECTION SUBCUTANEOUS at 05:06

## 2022-01-01 RX ADMIN — SODIUM CHLORIDE: 0.9 INJECTION, SOLUTION INTRAVENOUS at 09:05

## 2022-01-01 RX ADMIN — Medication 0.2 MCG/KG/MIN: at 10:06

## 2022-01-01 RX ADMIN — CARVEDILOL 12.5 MG: 12.5 TABLET, FILM COATED ORAL at 07:05

## 2022-01-01 RX ADMIN — Medication 400 MG: at 09:05

## 2022-01-01 RX ADMIN — SODIUM CHLORIDE: 0.9 INJECTION, SOLUTION INTRAVENOUS at 08:01

## 2022-01-01 RX ADMIN — CEFEPIME HYDROCHLORIDE 2 G: 2 INJECTION, SOLUTION INTRAVENOUS at 04:02

## 2022-01-01 RX ADMIN — CEFTRIAXONE 2 G: 2 INJECTION, SOLUTION INTRAVENOUS at 12:04

## 2022-01-01 RX ADMIN — HYDRALAZINE HYDROCHLORIDE 25 MG: 25 TABLET, FILM COATED ORAL at 06:02

## 2022-01-01 RX ADMIN — ACETAMINOPHEN 650 MG: 325 TABLET ORAL at 11:03

## 2022-01-01 RX ADMIN — INSULIN ASPART 1 UNITS: 100 INJECTION, SOLUTION INTRAVENOUS; SUBCUTANEOUS at 09:06

## 2022-01-01 RX ADMIN — INSULIN ASPART 3 UNITS: 100 INJECTION, SOLUTION INTRAVENOUS; SUBCUTANEOUS at 05:05

## 2022-01-01 RX ADMIN — Medication 800 MG: at 10:06

## 2022-01-01 RX ADMIN — ASPIRIN 81 MG: 81 TABLET, COATED ORAL at 09:02

## 2022-01-01 RX ADMIN — THERA TABS 1 TABLET: TAB at 09:06

## 2022-01-01 RX ADMIN — HYDROCORTISONE: 25 CREAM TOPICAL at 09:05

## 2022-01-01 RX ADMIN — TRAMADOL HYDROCHLORIDE 50 MG: 50 TABLET, COATED ORAL at 09:05

## 2022-01-01 RX ADMIN — AZACITIDINE 145 MG: 100 INJECTION, POWDER, LYOPHILIZED, FOR SOLUTION INTRAVENOUS; SUBCUTANEOUS at 01:02

## 2022-01-01 RX ADMIN — IOHEXOL 75 ML: 350 INJECTION, SOLUTION INTRAVENOUS at 06:04

## 2022-01-01 RX ADMIN — ASPIRIN 81 MG: 81 TABLET, COATED ORAL at 12:06

## 2022-01-01 RX ADMIN — COLCHICINE 0.6 MG: 0.6 TABLET, FILM COATED ORAL at 08:06

## 2022-01-01 RX ADMIN — ASPIRIN 81 MG: 81 TABLET, COATED ORAL at 10:05

## 2022-01-01 RX ADMIN — ACETAMINOPHEN 650 MG: 325 TABLET ORAL at 05:06

## 2022-01-01 RX ADMIN — CITALOPRAM HYDROBROMIDE 20 MG: 20 TABLET ORAL at 10:05

## 2022-01-01 RX ADMIN — DIPHENHYDRAMINE HYDROCHLORIDE 25 MG: 25 CAPSULE ORAL at 08:01

## 2022-01-01 RX ADMIN — CHLORTHALIDONE 25 MG: 25 TABLET ORAL at 08:05

## 2022-01-01 RX ADMIN — ACYCLOVIR 400 MG: 200 CAPSULE ORAL at 09:06

## 2022-01-01 RX ADMIN — ASPIRIN 81 MG: 81 TABLET, COATED ORAL at 10:06

## 2022-01-01 RX ADMIN — SODIUM CHLORIDE: 0.9 INJECTION, SOLUTION INTRAVENOUS at 02:03

## 2022-01-01 RX ADMIN — POTASSIUM CHLORIDE 20 MEQ: 20 TABLET, EXTENDED RELEASE ORAL at 05:05

## 2022-01-01 RX ADMIN — ASPIRIN 81 MG: 81 TABLET, COATED ORAL at 08:02

## 2022-01-01 RX ADMIN — ACYCLOVIR 400 MG: 200 CAPSULE ORAL at 08:06

## 2022-01-01 RX ADMIN — Medication 9 MG: at 09:05

## 2022-01-01 RX ADMIN — HYDROCHLOROTHIAZIDE 25 MG: 25 TABLET ORAL at 08:05

## 2022-01-01 RX ADMIN — CETIRIZINE HYDROCHLORIDE 10 MG: 5 TABLET, FILM COATED ORAL at 08:06

## 2022-01-01 RX ADMIN — AZACITIDINE 145 MG: 100 INJECTION, POWDER, LYOPHILIZED, FOR SOLUTION INTRAVENOUS; SUBCUTANEOUS at 12:02

## 2022-01-01 RX ADMIN — MORPHINE SULFATE 4 MG: 4 INJECTION INTRAVENOUS at 01:06

## 2022-01-01 RX ADMIN — CARVEDILOL 12.5 MG: 12.5 TABLET, FILM COATED ORAL at 08:04

## 2022-01-01 RX ADMIN — POTASSIUM CHLORIDE 20 MEQ: 1500 TABLET, EXTENDED RELEASE ORAL at 09:05

## 2022-01-01 RX ADMIN — ASPIRIN 81 MG: 81 TABLET, COATED ORAL at 02:06

## 2022-01-01 RX ADMIN — ASPIRIN 81 MG: 81 TABLET, COATED ORAL at 09:06

## 2022-01-01 RX ADMIN — CHLORTHALIDONE 25 MG: 25 TABLET ORAL at 10:05

## 2022-01-01 RX ADMIN — POTASSIUM & SODIUM PHOSPHATES POWDER PACK 280-160-250 MG 1 PACKET: 280-160-250 PACK at 02:05

## 2022-01-01 RX ADMIN — COLCHICINE 0.6 MG: 0.6 TABLET, FILM COATED ORAL at 08:05

## 2022-01-01 RX ADMIN — SENNOSIDES AND DOCUSATE SODIUM 1 TABLET: 50; 8.6 TABLET ORAL at 10:05

## 2022-01-01 RX ADMIN — INSULIN ASPART 1 UNITS: 100 INJECTION, SOLUTION INTRAVENOUS; SUBCUTANEOUS at 10:05

## 2022-01-01 RX ADMIN — ACETAMINOPHEN 650 MG: 325 TABLET ORAL at 09:04

## 2022-01-01 RX ADMIN — PREDNISONE 40 MG: 20 TABLET ORAL at 09:06

## 2022-01-01 RX ADMIN — PREDNISONE 60 MG: 20 TABLET ORAL at 08:05

## 2022-01-01 RX ADMIN — ENOXAPARIN SODIUM 40 MG: 100 INJECTION SUBCUTANEOUS at 04:06

## 2022-01-01 RX ADMIN — ACYCLOVIR 400 MG: 200 CAPSULE ORAL at 10:05

## 2022-01-01 RX ADMIN — POTASSIUM CHLORIDE 20 MEQ: 1500 TABLET, EXTENDED RELEASE ORAL at 08:05

## 2022-01-01 RX ADMIN — Medication 400 MG: at 08:04

## 2022-01-01 RX ADMIN — ACETAMINOPHEN 650 MG: 325 TABLET ORAL at 10:06

## 2022-01-01 RX ADMIN — DIPHENHYDRAMINE HYDROCHLORIDE 25 MG: 25 CAPSULE ORAL at 09:04

## 2022-01-01 RX ADMIN — CARVEDILOL 25 MG: 25 TABLET, FILM COATED ORAL at 11:05

## 2022-01-01 RX ADMIN — Medication 400 MG: at 03:05

## 2022-01-01 RX ADMIN — ACETAMINOPHEN 650 MG: 325 TABLET ORAL at 01:06

## 2022-01-01 RX ADMIN — POTASSIUM & SODIUM PHOSPHATES POWDER PACK 280-160-250 MG 1 PACKET: 280-160-250 PACK at 05:05

## 2022-01-01 RX ADMIN — NIFEDIPINE 60 MG: 60 TABLET, FILM COATED, EXTENDED RELEASE ORAL at 02:06

## 2022-01-01 RX ADMIN — AZITHROMYCIN 500 MG: 500 INJECTION, POWDER, LYOPHILIZED, FOR SOLUTION INTRAVENOUS at 06:04

## 2022-01-01 RX ADMIN — LISINOPRIL 20 MG: 20 TABLET ORAL at 09:05

## 2022-01-01 RX ADMIN — ALLOPURINOL 100 MG: 100 TABLET ORAL at 11:05

## 2022-01-01 RX ADMIN — COLCHICINE 0.3 MG: 0.6 TABLET, FILM COATED ORAL at 08:04

## 2022-01-01 RX ADMIN — SUCRALFATE 1 G: 1 SUSPENSION ORAL at 01:04

## 2022-01-01 RX ADMIN — PREDNISONE 60 MG: 20 TABLET ORAL at 09:05

## 2022-01-01 RX ADMIN — ALLOPURINOL 100 MG: 100 TABLET ORAL at 08:06

## 2022-01-01 RX ADMIN — ACETAMINOPHEN 650 MG: 325 TABLET ORAL at 08:06

## 2022-01-01 RX ADMIN — NIFEDIPINE 60 MG: 30 TABLET, FILM COATED, EXTENDED RELEASE ORAL at 08:06

## 2022-01-01 RX ADMIN — MUPIROCIN: 20 OINTMENT TOPICAL at 08:04

## 2022-01-01 RX ADMIN — INSULIN ASPART 1 UNITS: 100 INJECTION, SOLUTION INTRAVENOUS; SUBCUTANEOUS at 09:05

## 2022-01-01 RX ADMIN — HEPARIN 500 UNITS: 100 SYRINGE at 04:03

## 2022-01-01 RX ADMIN — CITALOPRAM HYDROBROMIDE 20 MG: 20 TABLET ORAL at 09:04

## 2022-01-01 RX ADMIN — AZITHROMYCIN MONOHYDRATE 500 MG: 250 TABLET ORAL at 05:04

## 2022-01-01 RX ADMIN — HYDROCORTISONE: 25 CREAM TOPICAL at 08:06

## 2022-01-01 RX ADMIN — ENOXAPARIN SODIUM 40 MG: 100 INJECTION SUBCUTANEOUS at 05:05

## 2022-01-01 RX ADMIN — IPRATROPIUM BROMIDE AND ALBUTEROL SULFATE 3 ML: 2.5; .5 SOLUTION RESPIRATORY (INHALATION) at 03:04

## 2022-01-01 RX ADMIN — ATROPINE SULFATE 1 MG: 0.1 INJECTION INTRAVENOUS at 09:06

## 2022-01-01 RX ADMIN — COLCHICINE 0.6 MG: 0.6 TABLET, FILM COATED ORAL at 09:06

## 2022-01-01 RX ADMIN — DAPSONE 100 MG: 100 TABLET ORAL at 09:05

## 2022-01-01 RX ADMIN — CARVEDILOL 12.5 MG: 12.5 TABLET, FILM COATED ORAL at 09:04

## 2022-01-01 RX ADMIN — IPRATROPIUM BROMIDE AND ALBUTEROL SULFATE 3 ML: 2.5; .5 SOLUTION RESPIRATORY (INHALATION) at 04:04

## 2022-01-01 RX ADMIN — THERA TABS 1 TABLET: TAB at 08:06

## 2022-01-01 RX ADMIN — CETIRIZINE HYDROCHLORIDE 10 MG: 10 TABLET, FILM COATED ORAL at 10:05

## 2022-01-01 RX ADMIN — ONDANSETRON 4 MG: 2 INJECTION INTRAMUSCULAR; INTRAVENOUS at 08:02

## 2022-01-01 RX ADMIN — DEFERASIROX 1500 MG: 500 TABLET, FOR SUSPENSION ORAL at 05:06

## 2022-01-01 RX ADMIN — CITALOPRAM HYDROBROMIDE 20 MG: 20 TABLET ORAL at 09:06

## 2022-01-01 RX ADMIN — CITALOPRAM HYDROBROMIDE 20 MG: 20 TABLET ORAL at 09:02

## 2022-01-01 RX ADMIN — INSULIN ASPART 1 UNITS: 100 INJECTION, SOLUTION INTRAVENOUS; SUBCUTANEOUS at 11:05

## 2022-01-01 RX ADMIN — GABAPENTIN 100 MG: 100 CAPSULE ORAL at 10:06

## 2022-01-01 RX ADMIN — MORPHINE SULFATE 4 MG: 4 INJECTION INTRAVENOUS at 11:06

## 2022-01-01 RX ADMIN — THERA TABS 1 TABLET: TAB at 09:05

## 2022-01-01 RX ADMIN — CARVEDILOL 12.5 MG: 12.5 TABLET, FILM COATED ORAL at 05:04

## 2022-01-01 RX ADMIN — CETIRIZINE HYDROCHLORIDE 10 MG: 10 TABLET, FILM COATED ORAL at 11:02

## 2022-01-01 RX ADMIN — CEFEPIME HYDROCHLORIDE 2 G: 2 INJECTION, SOLUTION INTRAVENOUS at 11:05

## 2022-01-01 RX ADMIN — LISINOPRIL 20 MG: 20 TABLET ORAL at 10:06

## 2022-01-01 RX ADMIN — DAPSONE 100 MG: 100 TABLET ORAL at 10:06

## 2022-01-01 RX ADMIN — ONDANSETRON 8 MG: 4 TABLET, ORALLY DISINTEGRATING ORAL at 02:01

## 2022-01-01 RX ADMIN — ALUMINUM HYDROXIDE, MAGNESIUM HYDROXIDE, AND SIMETHICONE 30 ML: 200; 200; 20 SUSPENSION ORAL at 08:04

## 2022-01-01 RX ADMIN — DAPSONE 100 MG: 100 TABLET ORAL at 08:06

## 2022-01-01 RX ADMIN — HEPARIN SODIUM (PORCINE) LOCK FLUSH IV SOLN 100 UNIT/ML 500 UNITS: 100 SOLUTION at 03:04

## 2022-01-01 RX ADMIN — SODIUM CHLORIDE 1000 ML: 0.9 INJECTION, SOLUTION INTRAVENOUS at 01:04

## 2022-01-01 RX ADMIN — Medication 400 MG: at 08:05

## 2022-01-01 RX ADMIN — INSULIN ASPART 1 UNITS: 100 INJECTION, SOLUTION INTRAVENOUS; SUBCUTANEOUS at 08:06

## 2022-01-01 RX ADMIN — AZACITIDINE 145 MG: 100 INJECTION, POWDER, LYOPHILIZED, FOR SOLUTION INTRAVENOUS; SUBCUTANEOUS at 02:03

## 2022-01-01 RX ADMIN — Medication 10 ML: at 02:04

## 2022-01-01 RX ADMIN — IPRATROPIUM BROMIDE AND ALBUTEROL SULFATE 3 ML: 2.5; .5 SOLUTION RESPIRATORY (INHALATION) at 11:04

## 2022-01-01 RX ADMIN — ALUMINUM HYDROXIDE, MAGNESIUM HYDROXIDE, AND SIMETHICONE 30 ML: 200; 200; 20 SUSPENSION ORAL at 12:04

## 2022-01-01 RX ADMIN — SODIUM CHLORIDE: 0.9 INJECTION, SOLUTION INTRAVENOUS at 11:03

## 2022-01-01 RX ADMIN — PREDNISONE 50 MG: 20 TABLET ORAL at 09:06

## 2022-01-01 RX ADMIN — TRAMADOL HYDROCHLORIDE 50 MG: 50 TABLET, COATED ORAL at 04:05

## 2022-01-01 RX ADMIN — INSULIN ASPART 2 UNITS: 100 INJECTION, SOLUTION INTRAVENOUS; SUBCUTANEOUS at 06:06

## 2022-01-01 RX ADMIN — CHLORTHALIDONE 25 MG: 25 TABLET ORAL at 09:05

## 2022-01-01 RX ADMIN — MELATONIN TAB 3 MG 9 MG: 3 TAB at 09:05

## 2022-01-01 RX ADMIN — IPRATROPIUM BROMIDE AND ALBUTEROL SULFATE 3 ML: 2.5; .5 SOLUTION RESPIRATORY (INHALATION) at 07:04

## 2022-01-01 RX ADMIN — ALLOPURINOL 100 MG: 100 TABLET ORAL at 12:06

## 2022-01-01 RX ADMIN — Medication 6 MG: at 10:05

## 2022-01-01 RX ADMIN — ACETAMINOPHEN 650 MG: 325 TABLET ORAL at 01:01

## 2022-01-01 RX ADMIN — CARVEDILOL 25 MG: 25 TABLET, FILM COATED ORAL at 10:06

## 2022-01-01 RX ADMIN — INSULIN ASPART 2 UNITS: 100 INJECTION, SOLUTION INTRAVENOUS; SUBCUTANEOUS at 06:05

## 2022-01-01 RX ADMIN — INSULIN ASPART 4 UNITS: 100 INJECTION, SOLUTION INTRAVENOUS; SUBCUTANEOUS at 05:05

## 2022-01-01 RX ADMIN — CARVEDILOL 12.5 MG: 12.5 TABLET, FILM COATED ORAL at 05:05

## 2022-01-01 RX ADMIN — DAPSONE 100 MG: 100 TABLET ORAL at 11:05

## 2022-01-01 RX ADMIN — COLCHICINE 0.6 MG: 0.6 TABLET, FILM COATED ORAL at 09:05

## 2022-01-01 RX ADMIN — MIRTAZAPINE 7.5 MG: 7.5 TABLET, FILM COATED ORAL at 08:06

## 2022-01-01 RX ADMIN — ENOXAPARIN SODIUM 40 MG: 100 INJECTION SUBCUTANEOUS at 04:05

## 2022-01-01 RX ADMIN — CEFEPIME 2 G: 2 INJECTION, POWDER, FOR SOLUTION INTRAVENOUS at 11:05

## 2022-01-01 RX ADMIN — AZACITIDINE 145 MG: 100 INJECTION, POWDER, LYOPHILIZED, FOR SOLUTION INTRAVENOUS; SUBCUTANEOUS at 01:03

## 2022-01-01 RX ADMIN — ALUMINUM HYDROXIDE, MAGNESIUM HYDROXIDE, AND SIMETHICONE 30 ML: 200; 200; 20 SUSPENSION ORAL at 05:04

## 2022-01-01 RX ADMIN — POTASSIUM CHLORIDE 40 MEQ: 20 TABLET, EXTENDED RELEASE ORAL at 08:04

## 2022-01-01 RX ADMIN — COLCHICINE 0.3 MG: 0.6 TABLET, FILM COATED ORAL at 09:04

## 2022-01-01 RX ADMIN — IPRATROPIUM BROMIDE AND ALBUTEROL SULFATE 3 ML: 2.5; .5 SOLUTION RESPIRATORY (INHALATION) at 09:04

## 2022-01-01 RX ADMIN — NIFEDIPINE 60 MG: 60 TABLET, FILM COATED, EXTENDED RELEASE ORAL at 09:05

## 2022-01-01 RX ADMIN — HYDROCHLOROTHIAZIDE 25 MG: 25 TABLET ORAL at 09:04

## 2022-01-01 RX ADMIN — SODIUM CHLORIDE: 0.9 INJECTION, SOLUTION INTRAVENOUS at 11:05

## 2022-01-01 RX ADMIN — DEFERASIROX 1500 MG: 500 TABLET, FOR SUSPENSION ORAL at 06:06

## 2022-01-01 RX ADMIN — IPRATROPIUM BROMIDE AND ALBUTEROL SULFATE 3 ML: 2.5; .5 SOLUTION RESPIRATORY (INHALATION) at 11:05

## 2022-01-01 RX ADMIN — ACETAMINOPHEN 650 MG: 325 TABLET ORAL at 01:04

## 2022-01-01 RX ADMIN — THERA TABS 1 TABLET: TAB at 10:05

## 2022-01-01 RX ADMIN — LISINOPRIL 20 MG: 20 TABLET ORAL at 08:05

## 2022-01-01 RX ADMIN — ACYCLOVIR 400 MG: 200 CAPSULE ORAL at 10:06

## 2022-01-01 RX ADMIN — ALUMINUM HYDROXIDE, MAGNESIUM HYDROXIDE, AND SIMETHICONE 30 ML: 200; 200; 20 SUSPENSION ORAL at 04:04

## 2022-01-01 RX ADMIN — SODIUM CHLORIDE 1000 ML: 0.9 INJECTION, SOLUTION INTRAVENOUS at 09:06

## 2022-01-01 RX ADMIN — IPRATROPIUM BROMIDE AND ALBUTEROL SULFATE 3 ML: 2.5; .5 SOLUTION RESPIRATORY (INHALATION) at 09:05

## 2022-01-01 RX ADMIN — IPRATROPIUM BROMIDE AND ALBUTEROL SULFATE 3 ML: 2.5; .5 SOLUTION RESPIRATORY (INHALATION) at 12:04

## 2022-01-01 RX ADMIN — ALUMINUM HYDROXIDE, MAGNESIUM HYDROXIDE, AND SIMETHICONE 30 ML: 200; 200; 20 SUSPENSION ORAL at 06:04

## 2022-01-01 RX ADMIN — ALLOPURINOL 100 MG: 100 TABLET ORAL at 10:06

## 2022-01-01 RX ADMIN — PREDNISONE 40 MG: 20 TABLET ORAL at 10:06

## 2022-01-01 RX ADMIN — NIFEDIPINE 30 MG: 30 TABLET, FILM COATED, EXTENDED RELEASE ORAL at 08:05

## 2022-01-01 RX ADMIN — LISINOPRIL 40 MG: 20 TABLET ORAL at 09:04

## 2022-01-01 RX ADMIN — DICLOFENAC SODIUM 2 G: 10 GEL TOPICAL at 10:05

## 2022-01-01 RX ADMIN — CITALOPRAM HYDROBROMIDE 20 MG: 20 TABLET ORAL at 08:04

## 2022-01-01 RX ADMIN — FLUCONAZOLE 400 MG: 200 TABLET ORAL at 10:05

## 2022-01-01 RX ADMIN — ACETAMINOPHEN 650 MG: 325 TABLET ORAL at 02:02

## 2022-01-01 RX ADMIN — CITALOPRAM HYDROBROMIDE 20 MG: 20 TABLET ORAL at 08:02

## 2022-01-01 RX ADMIN — SODIUM CHLORIDE: 0.9 INJECTION, SOLUTION INTRAVENOUS at 01:01

## 2022-01-01 RX ADMIN — POTASSIUM CHLORIDE 20 MEQ: 20 TABLET, EXTENDED RELEASE ORAL at 01:05

## 2022-01-01 RX ADMIN — IPRATROPIUM BROMIDE AND ALBUTEROL SULFATE 3 ML: 2.5; .5 SOLUTION RESPIRATORY (INHALATION) at 08:04

## 2022-01-01 RX ADMIN — PANTOPRAZOLE SODIUM 40 MG: 40 TABLET, DELAYED RELEASE ORAL at 02:06

## 2022-01-01 RX ADMIN — CARVEDILOL 25 MG: 25 TABLET, FILM COATED ORAL at 10:05

## 2022-01-01 RX ADMIN — SENNOSIDES AND DOCUSATE SODIUM 1 TABLET: 50; 8.6 TABLET ORAL at 11:05

## 2022-01-01 RX ADMIN — PREDNISONE 50 MG: 20 TABLET ORAL at 10:06

## 2022-01-01 RX ADMIN — NIFEDIPINE 60 MG: 30 TABLET, FILM COATED, EXTENDED RELEASE ORAL at 09:05

## 2022-01-01 RX ADMIN — ACYCLOVIR 400 MG: 200 CAPSULE ORAL at 09:04

## 2022-01-01 RX ADMIN — LIDOCAINE HYDROCHLORIDE 8 ML: 10 INJECTION, SOLUTION INFILTRATION; PERINEURAL at 08:05

## 2022-01-01 RX ADMIN — CITALOPRAM HYDROBROMIDE 20 MG: 20 TABLET ORAL at 02:06

## 2022-01-01 RX ADMIN — LISINOPRIL 20 MG: 20 TABLET ORAL at 12:06

## 2022-01-01 RX ADMIN — CARVEDILOL 25 MG: 25 TABLET, FILM COATED ORAL at 07:05

## 2022-01-01 RX ADMIN — ACYCLOVIR 400 MG: 200 CAPSULE ORAL at 10:04

## 2022-01-01 RX ADMIN — MELATONIN TAB 3 MG 9 MG: 3 TAB at 08:06

## 2022-01-01 RX ADMIN — AZACITIDINE 145 MG: 100 INJECTION, POWDER, LYOPHILIZED, FOR SOLUTION INTRAVENOUS; SUBCUTANEOUS at 11:03

## 2022-01-01 RX ADMIN — NOREPINEPHRINE BITARTRATE 0.2 MCG/KG/MIN: 4 INJECTION, SOLUTION INTRAVENOUS at 10:06

## 2022-01-01 RX ADMIN — TRAMADOL HYDROCHLORIDE 50 MG: 50 TABLET, COATED ORAL at 10:05

## 2022-01-01 RX ADMIN — NICOTINE 1 PATCH: 7 PATCH TRANSDERMAL at 09:02

## 2022-01-01 RX ADMIN — AZACITIDINE 145 MG: 100 INJECTION, POWDER, LYOPHILIZED, FOR SOLUTION INTRAVENOUS; SUBCUTANEOUS at 02:01

## 2022-01-01 RX ADMIN — PREDNISONE 50 MG: 20 TABLET ORAL at 08:05

## 2022-01-01 RX ADMIN — MAGNESIUM SULFATE 1 G: 500 INJECTION, SOLUTION INTRAMUSCULAR; INTRAVENOUS at 09:05

## 2022-01-01 RX ADMIN — POTASSIUM CHLORIDE 20 MEQ: 20 TABLET, EXTENDED RELEASE ORAL at 09:05

## 2022-01-01 RX ADMIN — PREDNISONE 60 MG: 20 TABLET ORAL at 11:05

## 2022-01-01 RX ADMIN — CIPROFLOXACIN 500 MG: 500 TABLET, FILM COATED ORAL at 09:05

## 2022-01-01 RX ADMIN — ACYCLOVIR 400 MG: 200 CAPSULE ORAL at 12:06

## 2022-01-01 RX ADMIN — SODIUM CHLORIDE: 0.9 INJECTION, SOLUTION INTRAVENOUS at 11:04

## 2022-01-01 RX ADMIN — CEFEPIME 2 G: 2 INJECTION, POWDER, FOR SOLUTION INTRAVENOUS at 10:05

## 2022-01-01 RX ADMIN — DEFERASIROX 1500 MG: 500 TABLET, FOR SUSPENSION ORAL at 09:06

## 2022-01-01 RX ADMIN — ACETAMINOPHEN 650 MG: 325 TABLET ORAL at 05:04

## 2022-01-01 RX ADMIN — ACETAMINOPHEN 650 MG: 325 TABLET ORAL at 06:06

## 2022-01-01 RX ADMIN — CARVEDILOL 25 MG: 25 TABLET, FILM COATED ORAL at 06:06

## 2022-01-01 RX ADMIN — NIFEDIPINE 60 MG: 30 TABLET, FILM COATED, EXTENDED RELEASE ORAL at 12:06

## 2022-01-01 RX ADMIN — Medication 400 MG: at 10:05

## 2022-01-01 RX ADMIN — NIFEDIPINE 60 MG: 60 TABLET, FILM COATED, EXTENDED RELEASE ORAL at 08:05

## 2022-01-01 RX ADMIN — CITALOPRAM HYDROBROMIDE 20 MG: 20 TABLET ORAL at 10:06

## 2022-01-01 RX ADMIN — CETIRIZINE HYDROCHLORIDE 10 MG: 5 TABLET, FILM COATED ORAL at 10:06

## 2022-01-01 RX ADMIN — IOHEXOL 100 ML: 350 INJECTION, SOLUTION INTRAVENOUS at 10:05

## 2022-01-01 RX ADMIN — Medication 6 MG: at 12:04

## 2022-01-01 RX ADMIN — CARVEDILOL 25 MG: 25 TABLET, FILM COATED ORAL at 04:06

## 2022-01-01 RX ADMIN — PREDNISONE 50 MG: 20 TABLET ORAL at 09:05

## 2022-01-01 RX ADMIN — CIPROFLOXACIN 500 MG: 500 TABLET, FILM COATED ORAL at 10:06

## 2022-01-01 RX ADMIN — ACETAMINOPHEN 650 MG: 325 TABLET ORAL at 08:01

## 2022-01-01 RX ADMIN — PANTOPRAZOLE SODIUM 40 MG: 40 TABLET, DELAYED RELEASE ORAL at 10:05

## 2022-01-01 RX ADMIN — MINERAL OIL, PETROLATUM, PHENYLEPHRINE HCL 1 APPLICATOR: 14; 74.9; .25 OINTMENT RECTAL at 09:05

## 2022-01-01 RX ADMIN — ACYCLOVIR 400 MG: 200 CAPSULE ORAL at 08:02

## 2022-01-01 RX ADMIN — INSULIN ASPART 3 UNITS: 100 INJECTION, SOLUTION INTRAVENOUS; SUBCUTANEOUS at 12:06

## 2022-01-01 RX ADMIN — PREDNISONE 40 MG: 20 TABLET ORAL at 02:06

## 2022-01-01 RX ADMIN — PREDNISONE 60 MG: 20 TABLET ORAL at 10:05

## 2022-01-01 RX ADMIN — ACETAMINOPHEN 500 MG: 500 TABLET ORAL at 08:02

## 2022-01-01 RX ADMIN — Medication 800 MG: at 12:06

## 2022-01-01 RX ADMIN — DIPHENHYDRAMINE HYDROCHLORIDE 25 MG: 25 CAPSULE ORAL at 02:02

## 2022-01-01 RX ADMIN — HEPARIN 500 UNITS: 100 SYRINGE at 05:02

## 2022-01-01 RX ADMIN — MIRTAZAPINE 7.5 MG: 7.5 TABLET, FILM COATED ORAL at 10:06

## 2022-01-01 RX ADMIN — SUCRALFATE 1 G: 1 SUSPENSION ORAL at 06:04

## 2022-01-01 RX ADMIN — POTASSIUM CHLORIDE 20 MEQ: 20 TABLET, EXTENDED RELEASE ORAL at 11:05

## 2022-01-01 RX ADMIN — CETIRIZINE HYDROCHLORIDE 10 MG: 10 TABLET, FILM COATED ORAL at 08:04

## 2022-01-01 RX ADMIN — CEFEPIME 2 G: 2 INJECTION, POWDER, FOR SOLUTION INTRAVENOUS at 05:05

## 2022-01-01 RX ADMIN — POTASSIUM & SODIUM PHOSPHATES POWDER PACK 280-160-250 MG 1 PACKET: 280-160-250 PACK at 04:05

## 2022-01-01 RX ADMIN — DICLOFENAC SODIUM 2 G: 10 GEL TOPICAL at 10:06

## 2022-01-01 RX ADMIN — POTASSIUM CHLORIDE 20 MEQ: 1500 TABLET, EXTENDED RELEASE ORAL at 11:05

## 2022-01-01 RX ADMIN — NIFEDIPINE 60 MG: 30 TABLET, FILM COATED, EXTENDED RELEASE ORAL at 10:05

## 2022-01-01 RX ADMIN — CIPROFLOXACIN 500 MG: 500 TABLET, FILM COATED ORAL at 08:06

## 2022-01-01 RX ADMIN — DICLOFENAC SODIUM 2 G: 10 GEL TOPICAL at 12:06

## 2022-01-01 RX ADMIN — Medication 400 MG: at 11:05

## 2022-01-01 RX ADMIN — IOHEXOL 75 ML: 350 INJECTION, SOLUTION INTRAVENOUS at 08:06

## 2022-01-01 RX ADMIN — ACETAMINOPHEN 650 MG: 325 TABLET ORAL at 03:06

## 2022-01-01 RX ADMIN — AZACITIDINE 145 MG: 100 INJECTION, POWDER, LYOPHILIZED, FOR SOLUTION INTRAVENOUS; SUBCUTANEOUS at 11:04

## 2022-01-01 RX ADMIN — HEPARIN 500 UNITS: 100 SYRINGE at 02:04

## 2022-01-01 RX ADMIN — CITALOPRAM HYDROBROMIDE 20 MG: 20 TABLET ORAL at 11:05

## 2022-01-01 RX ADMIN — ASPIRIN 81 MG: 81 TABLET, COATED ORAL at 08:04

## 2022-01-01 RX ADMIN — COLCHICINE 0.6 MG: 0.6 TABLET, FILM COATED ORAL at 12:06

## 2022-01-01 RX ADMIN — POTASSIUM & SODIUM PHOSPHATES POWDER PACK 280-160-250 MG 1 PACKET: 280-160-250 PACK at 08:05

## 2022-01-01 RX ADMIN — PANTOPRAZOLE SODIUM 40 MG: 40 TABLET, DELAYED RELEASE ORAL at 08:04

## 2022-01-01 RX ADMIN — INSULIN ASPART 2 UNITS: 100 INJECTION, SOLUTION INTRAVENOUS; SUBCUTANEOUS at 08:06

## 2022-01-01 RX ADMIN — THERA TABS 1 TABLET: TAB at 12:06

## 2022-01-01 RX ADMIN — COLCHICINE 0.6 MG: 0.6 TABLET, FILM COATED ORAL at 02:05

## 2022-01-01 RX ADMIN — INSULIN ASPART 1 UNITS: 100 INJECTION, SOLUTION INTRAVENOUS; SUBCUTANEOUS at 08:05

## 2022-01-01 RX ADMIN — VANCOMYCIN HYDROCHLORIDE 2000 MG: 500 INJECTION, POWDER, LYOPHILIZED, FOR SOLUTION INTRAVENOUS at 11:06

## 2022-01-01 RX ADMIN — LEVOFLOXACIN 750 MG: 750 TABLET, FILM COATED ORAL at 08:04

## 2022-01-01 RX ADMIN — GABAPENTIN 100 MG: 100 CAPSULE ORAL at 08:05

## 2022-01-01 RX ADMIN — DAPSONE 100 MG: 100 TABLET ORAL at 12:06

## 2022-01-01 RX ADMIN — GUAIFENESIN 600 MG: 600 TABLET, EXTENDED RELEASE ORAL at 09:05

## 2022-01-01 RX ADMIN — LISINOPRIL 40 MG: 20 TABLET ORAL at 11:05

## 2022-01-01 RX ADMIN — AZACITIDINE 145 MG: 100 INJECTION, POWDER, LYOPHILIZED, FOR SOLUTION INTRAVENOUS; SUBCUTANEOUS at 02:04

## 2022-01-01 RX ADMIN — ASPIRIN 81 MG: 81 TABLET, COATED ORAL at 11:05

## 2022-01-01 RX ADMIN — ONDANSETRON 8 MG: 8 TABLET, ORALLY DISINTEGRATING ORAL at 02:05

## 2022-01-01 RX ADMIN — ALUMINUM HYDROXIDE, MAGNESIUM HYDROXIDE, AND SIMETHICONE 30 ML: 200; 200; 20 SUSPENSION ORAL at 10:04

## 2022-01-01 RX ADMIN — CHLORTHALIDONE 25 MG: 25 TABLET ORAL at 11:05

## 2022-01-01 RX ADMIN — CHLORTHALIDONE 25 MG: 25 TABLET ORAL at 08:06

## 2022-01-01 RX ADMIN — POTASSIUM & SODIUM PHOSPHATES POWDER PACK 280-160-250 MG 1 PACKET: 280-160-250 PACK at 09:05

## 2022-01-01 RX ADMIN — FLUCONAZOLE 400 MG: 200 TABLET ORAL at 08:06

## 2022-01-01 RX ADMIN — CEFEPIME HYDROCHLORIDE 2 G: 2 INJECTION, SOLUTION INTRAVENOUS at 01:05

## 2022-01-01 RX ADMIN — GABAPENTIN 100 MG: 100 CAPSULE ORAL at 02:05

## 2022-01-01 RX ADMIN — DAPSONE 100 MG: 100 TABLET ORAL at 10:05

## 2022-01-01 RX ADMIN — DIPHENHYDRAMINE HYDROCHLORIDE 25 MG: 25 CAPSULE ORAL at 01:02

## 2022-01-01 RX ADMIN — INSULIN ASPART 3 UNITS: 100 INJECTION, SOLUTION INTRAVENOUS; SUBCUTANEOUS at 05:06

## 2022-01-01 RX ADMIN — Medication 800 MG: at 11:05

## 2022-01-01 RX ADMIN — SODIUM CHLORIDE: 0.9 INJECTION, SOLUTION INTRAVENOUS at 12:04

## 2022-01-01 RX ADMIN — HEPARIN 500 UNITS: 100 SYRINGE at 10:01

## 2022-01-01 RX ADMIN — SODIUM CHLORIDE: 0.9 INJECTION, SOLUTION INTRAVENOUS at 01:04

## 2022-01-01 RX ADMIN — NIFEDIPINE 60 MG: 30 TABLET, FILM COATED, EXTENDED RELEASE ORAL at 10:06

## 2022-01-01 RX ADMIN — POTASSIUM CHLORIDE 20 MEQ: 20 TABLET, EXTENDED RELEASE ORAL at 08:05

## 2022-01-01 RX ADMIN — INSULIN ASPART 3 UNITS: 100 INJECTION, SOLUTION INTRAVENOUS; SUBCUTANEOUS at 06:06

## 2022-01-01 RX ADMIN — DIPHENHYDRAMINE HYDROCHLORIDE 25 MG: 25 CAPSULE ORAL at 11:03

## 2022-01-01 RX ADMIN — COLCHICINE 0.6 MG: 0.6 TABLET, FILM COATED ORAL at 11:05

## 2022-01-01 RX ADMIN — GABAPENTIN 100 MG: 100 CAPSULE ORAL at 08:02

## 2022-01-01 RX ADMIN — CARVEDILOL 25 MG: 25 TABLET, FILM COATED ORAL at 12:06

## 2022-01-01 RX ADMIN — THERA TABS 1 TABLET: TAB at 11:05

## 2022-01-01 RX ADMIN — Medication 6 MG: at 12:06

## 2022-01-01 RX ADMIN — SODIUM CHLORIDE 1000 ML: 0.9 INJECTION, SOLUTION INTRAVENOUS at 04:02

## 2022-01-01 RX ADMIN — PIPERACILLIN AND TAZOBACTAM 4.5 G: 4; .5 INJECTION, POWDER, LYOPHILIZED, FOR SOLUTION INTRAVENOUS; PARENTERAL at 06:02

## 2022-01-01 RX ADMIN — DIPHENHYDRAMINE HYDROCHLORIDE 25 MG: 25 CAPSULE ORAL at 02:03

## 2022-01-01 RX ADMIN — LIDOCAINE HYDROCHLORIDE 6 ML: 20 INJECTION, SOLUTION INFILTRATION; PERINEURAL at 08:05

## 2022-01-01 RX ADMIN — CEFTRIAXONE 2 G: 2 INJECTION, SOLUTION INTRAVENOUS at 01:04

## 2022-01-01 RX ADMIN — ALTEPLASE 2 MG: 2.2 INJECTION, POWDER, LYOPHILIZED, FOR SOLUTION INTRAVENOUS at 03:04

## 2022-01-01 RX ADMIN — HYDROCORTISONE: 25 CREAM TOPICAL at 10:05

## 2022-01-01 RX ADMIN — FLUCONAZOLE 400 MG: 200 TABLET ORAL at 09:02

## 2022-01-01 RX ADMIN — ENOXAPARIN SODIUM 40 MG: 100 INJECTION SUBCUTANEOUS at 10:04

## 2022-01-01 RX ADMIN — AZACITIDINE 145 MG: 100 INJECTION, POWDER, LYOPHILIZED, FOR SOLUTION INTRAVENOUS; SUBCUTANEOUS at 04:04

## 2022-01-01 RX ADMIN — GABAPENTIN 100 MG: 100 CAPSULE ORAL at 09:02

## 2022-01-01 RX ADMIN — NIFEDIPINE 60 MG: 60 TABLET, FILM COATED, EXTENDED RELEASE ORAL at 08:06

## 2022-01-01 RX ADMIN — PANTOPRAZOLE SODIUM 40 MG: 40 TABLET, DELAYED RELEASE ORAL at 11:05

## 2022-01-01 RX ADMIN — PIPERACILLIN SODIUM AND TAZOBACTAM SODIUM 4.5 G: 4; .5 INJECTION, POWDER, LYOPHILIZED, FOR SOLUTION INTRAVENOUS at 10:06

## 2022-01-01 RX ADMIN — ACYCLOVIR 400 MG: 200 CAPSULE ORAL at 11:05

## 2022-01-01 RX ADMIN — CETIRIZINE HYDROCHLORIDE 10 MG: 10 TABLET, FILM COATED ORAL at 08:06

## 2022-01-01 RX ADMIN — DIPHENHYDRAMINE HYDROCHLORIDE 25 MG: 25 CAPSULE ORAL at 12:05

## 2022-01-01 RX ADMIN — INSULIN ASPART 3 UNITS: 100 INJECTION, SOLUTION INTRAVENOUS; SUBCUTANEOUS at 06:05

## 2022-01-01 RX ADMIN — SUCRALFATE 1 G: 1 SUSPENSION ORAL at 11:04

## 2022-01-01 RX ADMIN — SODIUM CHLORIDE: 0.9 INJECTION, SOLUTION INTRAVENOUS at 07:05

## 2022-01-01 RX ADMIN — CEFEPIME 2 G: 2 INJECTION, POWDER, FOR SOLUTION INTRAVENOUS at 08:05

## 2022-01-01 RX ADMIN — FLUCONAZOLE 400 MG: 200 TABLET ORAL at 08:04

## 2022-01-01 RX ADMIN — LISINOPRIL 20 MG: 20 TABLET ORAL at 02:06

## 2022-01-01 RX ADMIN — HEPARIN 500 UNITS: 100 SYRINGE at 03:01

## 2022-01-01 RX ADMIN — Medication 500 UNITS: at 06:06

## 2022-01-01 RX ADMIN — CEFEPIME HYDROCHLORIDE 2 G: 2 INJECTION, SOLUTION INTRAVENOUS at 05:05

## 2022-01-01 RX ADMIN — CETIRIZINE HYDROCHLORIDE 10 MG: 5 TABLET, FILM COATED ORAL at 12:06

## 2022-01-01 RX ADMIN — DIPHENHYDRAMINE HYDROCHLORIDE 25 MG: 25 CAPSULE ORAL at 11:05

## 2022-01-01 RX ADMIN — CARVEDILOL 12.5 MG: 12.5 TABLET, FILM COATED ORAL at 07:04

## 2022-01-01 RX ADMIN — POTASSIUM & SODIUM PHOSPHATES POWDER PACK 280-160-250 MG 1 PACKET: 280-160-250 PACK at 10:05

## 2022-01-01 RX ADMIN — ACETAMINOPHEN 650 MG: 325 TABLET ORAL at 12:04

## 2022-01-01 RX ADMIN — ONDANSETRON 8 MG: 4 TABLET, ORALLY DISINTEGRATING ORAL at 01:01

## 2022-01-01 RX ADMIN — THERA TABS 1 TABLET: TAB at 10:06

## 2022-01-01 RX ADMIN — PANTOPRAZOLE SODIUM 40 MG: 40 TABLET, DELAYED RELEASE ORAL at 12:06

## 2022-01-01 RX ADMIN — CARVEDILOL 25 MG: 25 TABLET, FILM COATED ORAL at 07:06

## 2022-01-01 RX ADMIN — MAGNESIUM SULFATE IN WATER 2 G: 40 INJECTION, SOLUTION INTRAVENOUS at 10:02

## 2022-01-01 RX ADMIN — ALTEPLASE 2 MG: 2.2 INJECTION, POWDER, LYOPHILIZED, FOR SOLUTION INTRAVENOUS at 12:05

## 2022-01-01 RX ADMIN — HYDROCORTISONE: 25 CREAM TOPICAL at 10:06

## 2022-01-01 RX ADMIN — CEFEPIME 2 G: 2 INJECTION, POWDER, FOR SOLUTION INTRAVENOUS at 06:05

## 2022-01-01 RX ADMIN — IOHEXOL 75 ML: 350 INJECTION, SOLUTION INTRAVENOUS at 09:06

## 2022-01-01 RX ADMIN — DIPHENHYDRAMINE HYDROCHLORIDE 25 MG: 25 CAPSULE ORAL at 02:06

## 2022-01-01 RX ADMIN — DIPHENHYDRAMINE HYDROCHLORIDE 25 MG: 25 CAPSULE ORAL at 11:04

## 2022-01-01 RX ADMIN — ACETAMINOPHEN 650 MG: 325 TABLET ORAL at 01:02

## 2022-01-01 RX ADMIN — ACETAMINOPHEN 650 MG: 325 TABLET ORAL at 02:05

## 2022-01-01 RX ADMIN — LEVOFLOXACIN 750 MG: 750 TABLET, FILM COATED ORAL at 09:04

## 2022-01-01 RX ADMIN — SODIUM CHLORIDE: 0.9 INJECTION, SOLUTION INTRAVENOUS at 02:01

## 2022-01-01 RX ADMIN — PREDNISONE 40 MG: 20 TABLET ORAL at 08:06

## 2022-01-01 RX ADMIN — TRAMADOL HYDROCHLORIDE 50 MG: 50 TABLET, COATED ORAL at 11:05

## 2022-01-01 RX ADMIN — AZACITIDINE 145 MG: 100 INJECTION, POWDER, LYOPHILIZED, FOR SOLUTION INTRAVENOUS; SUBCUTANEOUS at 01:01

## 2022-01-01 RX ADMIN — AZACITIDINE 145 MG: 100 INJECTION, POWDER, LYOPHILIZED, FOR SOLUTION INTRAVENOUS; SUBCUTANEOUS at 12:01

## 2022-01-01 RX ADMIN — PREDNISONE 50 MG: 20 TABLET ORAL at 12:06

## 2022-01-01 RX ADMIN — Medication 800 MG: at 06:05

## 2022-01-01 RX ADMIN — DIPHENHYDRAMINE HYDROCHLORIDE 25 MG: 25 CAPSULE ORAL at 12:04

## 2022-01-01 RX ADMIN — LISINOPRIL AND HYDROCHLOROTHIAZIDE 2 TABLET: 12.5; 1 TABLET ORAL at 08:05

## 2022-01-01 RX ADMIN — DIPHENHYDRAMINE HYDROCHLORIDE 12.5 MG: 50 INJECTION INTRAMUSCULAR; INTRAVENOUS at 08:02

## 2022-01-01 RX ADMIN — VANCOMYCIN HYDROCHLORIDE 2000 MG: 10 INJECTION, POWDER, LYOPHILIZED, FOR SOLUTION INTRAVENOUS at 07:02

## 2022-01-01 RX ADMIN — FLUCONAZOLE 400 MG: 200 TABLET ORAL at 10:06

## 2022-01-01 RX ADMIN — LISINOPRIL 20 MG: 20 TABLET ORAL at 09:02

## 2022-01-01 RX ADMIN — INSULIN ASPART 2 UNITS: 100 INJECTION, SOLUTION INTRAVENOUS; SUBCUTANEOUS at 02:05

## 2022-01-01 RX ADMIN — NIFEDIPINE 60 MG: 30 TABLET, FILM COATED, EXTENDED RELEASE ORAL at 11:05

## 2022-01-01 RX ADMIN — PREDNISONE 50 MG: 20 TABLET ORAL at 10:05

## 2022-01-01 RX ADMIN — ALLOPURINOL 100 MG: 100 TABLET ORAL at 12:04

## 2022-01-01 RX ADMIN — Medication 6 MG: at 11:05

## 2022-01-01 RX ADMIN — MAGNESIUM SULFATE IN WATER 2 G: 40 INJECTION, SOLUTION INTRAVENOUS at 12:02

## 2022-01-01 RX ADMIN — TRAMADOL HYDROCHLORIDE 50 MG: 50 TABLET, COATED ORAL at 12:05

## 2022-01-01 RX ADMIN — NIFEDIPINE 60 MG: 60 TABLET, FILM COATED, EXTENDED RELEASE ORAL at 10:06

## 2022-01-01 RX ADMIN — MELATONIN TAB 3 MG 9 MG: 3 TAB at 10:05

## 2022-01-01 RX ADMIN — Medication 10 ML: at 04:03

## 2022-01-01 RX ADMIN — ACETAMINOPHEN 650 MG: 325 TABLET ORAL at 12:05

## 2022-01-01 RX ADMIN — ACETAMINOPHEN 650 MG: 325 TABLET ORAL at 09:06

## 2022-01-01 RX ADMIN — NIFEDIPINE 30 MG: 30 TABLET, FILM COATED, EXTENDED RELEASE ORAL at 03:05

## 2022-01-01 RX ADMIN — FLUCONAZOLE 400 MG: 200 TABLET ORAL at 11:05

## 2022-01-01 RX ADMIN — DIPHENHYDRAMINE HYDROCHLORIDE 25 MG: 25 CAPSULE ORAL at 02:01

## 2022-01-01 RX ADMIN — DIPHENHYDRAMINE HYDROCHLORIDE 25 MG: 25 CAPSULE ORAL at 08:06

## 2022-01-01 RX ADMIN — ACETAMINOPHEN 650 MG: 500 TABLET ORAL at 05:04

## 2022-01-01 RX ADMIN — AZACITIDINE 145 MG: 100 INJECTION, POWDER, LYOPHILIZED, FOR SOLUTION INTRAVENOUS; SUBCUTANEOUS at 12:04

## 2022-01-01 RX ADMIN — FUROSEMIDE 40 MG: 10 INJECTION, SOLUTION INTRAVENOUS at 10:05

## 2022-01-01 RX ADMIN — ACETAMINOPHEN 650 MG: 325 TABLET ORAL at 04:02

## 2022-01-01 RX ADMIN — CHLORTHALIDONE 25 MG: 25 TABLET ORAL at 02:06

## 2022-01-01 RX ADMIN — LEVOFLOXACIN 750 MG: 750 TABLET, FILM COATED ORAL at 11:04

## 2022-01-01 RX ADMIN — HEPARIN SODIUM (PORCINE) LOCK FLUSH IV SOLN 100 UNIT/ML 300 UNITS: 100 SOLUTION at 06:06

## 2022-01-01 RX ADMIN — CEFEPIME HYDROCHLORIDE 2 G: 2 INJECTION, SOLUTION INTRAVENOUS at 03:05

## 2022-01-01 RX ADMIN — ENOXAPARIN SODIUM 40 MG: 100 INJECTION SUBCUTANEOUS at 04:04

## 2022-01-01 RX ADMIN — Medication 10 ML: at 03:01

## 2022-01-01 RX ADMIN — NIFEDIPINE 60 MG: 60 TABLET, FILM COATED, EXTENDED RELEASE ORAL at 10:05

## 2022-01-10 NOTE — PROGRESS NOTES
Subjective:       Patient ID: Susan Puente is a 71 y.o. female.    Interval History: Ms. Puente presents today for routine follow-up for MDS 5 q del syndrome prior to cycle 36 of chemo (dacogen/vidaza) as third line therapy. She received decitabine for C23 due to vidaza national shortage and was able to switch back to vidaza with cycle 24. She continues to be dependent on transfusions intermittently. She reports feeling well overall today except for she notes congestion, dry/itchy eyes, PND. Denies cough. No recent fevers, chills, chest pain, sob, n/v/d/c. Ambulating independently with wheeled walker    Oncology History (per prior notes from Dr. Valdes)  Ms. Puente is a 68 year old female with hx of DM2, peripheral vascular disease, tobacco use, CAD, hyperlipidemia, hypertension who was hospitalized 11/10/16 for anemia. hgb 5.3  MCH 43.4 with normal WBC and platelets. Patient had normal iron stores. She was transfused 3 units of PRBCs and discharged home with hgb 8.7 on 11/11/16. She was referred for further evalutation of her anemia. On 12/16/16 patient had a normal SPEP and immunofixation. Slightly elevated kappa light chains with normal ration. Elevated vitamin b12, normal folate, JAYDEN was positive with a low titer and negative profile. Patient had a bone marrow biopsy 1/5/16 which showed the core biopsy is normocellular for age (40%); however, megakaryocytes are increased and show frequent small, hypolobated forms. Additionally, a subset of the neutrophils are hypogranular. Blasts are not increased by either morphology (1.2%) or in the corresponding flow cytometric analysis. Fish detects a 5q deletion in 54.5% of nuclei. Cytogenetics reported 20 metaphases, 2 metaphases were normal and 18 metaphases had a 5q deletion. No additional cytogenetic abnormalities were detected. Findings consistent with 5q deletion syndrome.     Patient had a delay in obtaining Revlimid 10 mg daily but did start  taking the medication 2/8/17. On 2/9/17 patient developed a diffuse maculopapular rash throughout scalp arms, legs and torso. She states she had no stridor or wheezing. She discontinued medication 2/15/17. Went to allergist who provided references on desensitization so that patient could resume Revlimid. Unfortunately developed pancytopenia and Revlimid stopped 6/16/17. She had a repeat BMBX  performed 6/8/17 and showed a hypercellular marrow (60%) continued atypia in the granulocytes and megakaryocytes were noted.  Additionally, there is erythroid atypia present. No increase in blasts. Cytogenetics are normal and MDS FISH is negative, failing to show 5 q minus. NGS should no significant molecular mutations.  Anemia work up revealed bienvenido negative hemolysis.    Revlimid has been stopped since June 2017 after she developed pancytopenia while on Revlimid (required desensitization). CBC was normal for almost 1 year and marrow was negative for del5q. Bone marrow biopsy repeat from 6/2018 showed relapsed 5q minus MDS without new or additional mutations. Revlimid restarted at 2.5 mg daily with prednisone to prevent allergic reaction. Titrated to a goal of 10mg daily Revlimid. Hospital admission 3/12-3/17/19 for unresponsive event after blood transfusion, found to be profoundly pancytopenic at admission. Since hospital admission Revlimid has been stopped. Repeat marrow March 2019 shows persistent MDS with 5q minus.     Started cycle 1 Vidaza 5/6/19 subq for 5 days every 28 days. Restaging bone marrow biopsy from 10/24/19 shows persistent MDS, no excess blasts and 3 of 20 metaphases with 5q deletion.    Review of Systems   Constitutional: Negative for chills, diaphoresis, fatigue and fever.   HENT: Positive for congestion, postnasal drip and rhinorrhea. Negative for ear pain, mouth sores, nosebleeds, sinus pressure, sinus pain, sore throat and trouble swallowing.    Eyes: Negative for pain, discharge and redness.    Respiratory: Negative for apnea, cough, chest tightness and shortness of breath.    Cardiovascular: Negative for chest pain, palpitations and leg swelling.   Gastrointestinal: Positive for nausea (intermittent). Negative for abdominal distention, abdominal pain, blood in stool, constipation, diarrhea and vomiting.   Genitourinary: Negative for dysuria and hematuria.   Musculoskeletal: Negative for back pain, gait problem and joint swelling.   Skin: Negative for rash.   Neurological: Negative for dizziness, tremors, syncope, numbness and headaches.   Psychiatric/Behavioral: Negative for behavioral problems and confusion.         Objective:       Vitals:    01/10/22 1032   BP: 136/62   Pulse: 70   Resp: 16   Temp: 99.8 °F (37.7 °C)     Physical Exam  Vitals and nursing note reviewed.   Constitutional:       Appearance: She is well-developed and well-nourished.   HENT:      Head: Normocephalic and atraumatic.      Right Ear: External ear normal.      Left Ear: External ear normal.      Mouth/Throat:      Mouth: Oropharynx is clear and moist.   Eyes:      Extraocular Movements: EOM normal.      Conjunctiva/sclera: Conjunctivae normal.   Cardiovascular:      Rate and Rhythm: Normal rate and regular rhythm.      Pulses: Intact distal pulses.      Heart sounds: Normal heart sounds. No murmur heard.      Pulmonary:      Effort: Pulmonary effort is normal. No respiratory distress.      Breath sounds: Normal breath sounds. No stridor. No wheezing or rales.   Abdominal:      General: Bowel sounds are normal. There is no distension.      Palpations: Abdomen is soft.      Tenderness: There is no abdominal tenderness.   Musculoskeletal:         General: No edema. Normal range of motion.      Cervical back: Normal range of motion.   Skin:     General: Skin is warm and dry.      Findings: No rash.   Neurological:      Mental Status: She is alert and oriented to person, place, and time.   Psychiatric:         Mood and Affect: Mood  and affect normal.         Behavior: Behavior normal.         Thought Content: Thought content normal.         Judgment: Judgment normal.             Assessment:       1. MDS (myelodysplastic syndrome) with 5q deletion    2. Anemia requiring transfusions    3. Chronic leukopenia    4. Abnormal CT of the chest    5. Type 2 diabetes mellitus without complication, unspecified whether long term insulin use    6. Essential hypertension    7. Coronary artery disease involving native coronary artery of native heart without angina pectoris    8. Adjustment disorder with mixed anxiety and depressed mood    9. Insomnia, unspecified type    10. Pancytopenia    11. Encounter for screening for COVID-19    12. Nasal congestion with rhinorrhea    13. Myelodysplastic syndrome    14. Prophylactic antibiotic    15. Depression, unspecified depression type    16. Bilateral sciatica        Plan:     MDS  - Primary oncologist, Dr. 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 Developed allergy and was then desensitized. Soon after developed pancytopenia and Revlimid held since June 2017.    - BMBX on 6/8/17 was with normal cytogenetics. 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 Discussed treatment options of HMA therapy or repeat trial of Revlimid and patient wished to proceed with Revlimid   - Revlimid stopped again due to repeat allergic reaction and pancytopenia 3/2019  - Started cycle 1 Vidaza 5/6/19 subq for 5 days every 28 days  - Restaging bone marrow biopsy following cycle 5 from 10/24/19 shows persistent MDS, no excess blasts and 3 of 20 metaphases with 5q deletion  - Tolerated previous cycles well. Package insert for Vidaza recommends holding if ANC <1000, however pt has been receiving this with an ANC below 1000 for each cycle. Okay to continue to give despite neutropenia per Dr. Valdes  - Restaging marrow on 04/21/21 with persistent MDS with isolated del 5Q. Of 20 metaphases, 5 were normal  and 15 had a 5q deletion, NGS positive for SF3B1 and TP53 (12%). 1.4% blasts  - Received vidaza for 22 cycles, received Decitabine for C23 due to national shortage of vidaza and then back to vidaza with C24.  - Ok to proceed with cycle 36 today  - Tolerating well, takes zofran PRN for nausea    Cytopenias 2/2 disease: anemia and neutropenia  - Transfuse for hgb < 7  - Continue ppx acyclovir, cipro, fluconazole (only taking 200mg due to previously elevated LFTs)  - can discuss stopping ppx antibiotic and antifungal at next MD visit given ANC >500  - Transfusion dependent, receives twice weekly labs, has chair on hold for transfusions    Abnormal CT chest  - presented to the ER 8/27 for chest pain  - No cardiac etiology identified  - improved after blood transfusion  - CTA of chest performed shows 2cm RUL spiculated mass suspicious for a primary lung malignancy in a prior smoker  - Lung biopsy in IR was scheduled on 11/3. Per IR lung nodules almost resolved, cancelled bx.  Repeat CT chest scheduled for 2/17/22    DM2  - Management per PCP  - Continue metformin  - Blood glucoses normal.    HTN  - Management per cardiologist   - Continue lisinopril-HCTZ and coreg  - bp wnl    CAD  - S/P left carotid endarterectomy prior to cancer dx  - Continue Repatha for HLD per PCP (intolerant to statins)  - Continue on ASA 81mg daily  - Seen by vascular surgery, Dr. Murguia. No intervention needed as asymptomatic    Adjustment disorder  - Improved mood on Celexa  - Saw psych onc historically, and mood stable so no need to get scheduled at this time    Myalgias/possible sciatica  - Started trial of gabapentin 100 mg bid 11/4/19 with improvement  - Mostly taking daily gabapentin in AMs  - Return to [facility] for water aerobics once able  - Received new Rolator walker    Insomnia  - Using 10mg melatonin OTC or tylenol PM    Congestion/PND  - will covid swab today per Dr. Valdes  - continue zyrtec or allegra    Neuropathy  -  continue gabapentin      Follow up:  - Schedule labs twice weekly (CBC, type and screen; add weekly CMP) Monday and Thursday for January and Feburary  - Schedule infusion chair Tuesday and Friday for possible transfusions  - Schedule return visit with CBC, CMP, T&S, and appt with Dr. Valdes on 2/7/22  - Schedule chemo chair for next cycle of vidaza from 2/7-2/11         Eden Salmeron NP  Hematology/Oncology/BMT

## 2022-01-10 NOTE — Clinical Note
- Schedule labs twice weekly (CBC, type and screen; add weekly CMP) Monday and Thursday for January and Feburary - Schedule infusion chair Tuesdays & Fridays for possible transfusions - Schedule return visit with CBC, CMP, T&S, and appt with Dr. Valdes on 2/7/22 - Schedule chemo chair for next cycle of vidaza from 2/7-2/11

## 2022-01-11 NOTE — TELEPHONE ENCOUNTER
Specialty Pharmacy - Refill Coordination    Specialty Medication Orders Linked to Encounter    Flowsheet Row Most Recent Value   Medication #1 evolocumab (REPATHA SURECLICK) 140 mg/mL PnIj (Order#[id number], Rx#[id number])          Refill Questions - Documented Responses    Flowsheet Row Most Recent Value   Patient Availability and HIPAA Verification    Does patient want to proceed with activity? Yes   HIPAA/medical authority confirmed? Yes   Relationship to patient of person spoken to? Self   Refill Screening Questions    Would patient like to speak to a pharmacist? No   When does the patient need to receive the medication? 01/12/22   Refill Delivery Questions    How will the patient receive the medication? Delivery Patricia   When does the patient need to receive the medication? 01/12/22   Shipping Address Home   Address in [facility] confirmed and updated if neccessary? Yes   Expected Copay ($) 0   Is the patient able to afford the medication copay? Yes   Payment Method zero copay   Days supply of Refill 28   Supplies needed? No supplies needed   Refill activity completed? Yes   Refill activity plan Refill scheduled   Shipment/Pickup Date: 01/12/22          Current Outpatient Medications   Medication Sig    acetaminophen (TYLENOL) 650 MG TbSR Take 650 mg by mouth every 8 (eight) hours as needed (for pain).    acyclovir (ZOVIRAX) 400 MG tablet Take 1 tablet (400 mg total) by mouth 2 (two) times daily.    aspirin (ECOTRIN) 81 MG EC tablet Take 1 tablet (81 mg total) by mouth once daily.    azacitidine (VIDAZA INJ) Inject as directed every 28 days.    azaCITIDine (VIDAZA) 100 mg SolR chemo injection     blood sugar diagnostic Strp To check blood sugar 1 times daily (Patient taking differently: To check blood sugar 1 times daily)    carvediloL (COREG) 12.5 MG tablet Take 1 tablet (12.5 mg total) by mouth 2 (two) times daily with meals.    ciprofloxacin HCl (CIPRO) 500 MG tablet Take 1 tablet (500 mg total)  by mouth 2 (two) times daily.    ciprofloxacin HCl (CIPRO) 500 MG tablet Take 1 tablet (500 mg total) by mouth 2 (two) times daily.    citalopram (CELEXA) 20 MG tablet Take 1 tablet (20 mg total) by mouth once daily.    citalopram (CELEXA) 20 MG tablet Take 1 tablet (20 mg total) by mouth once daily.    evolocumab (REPATHA SURECLICK) 140 mg/mL PnIj Inject 140mg (1 pen) into the skin every 2 weeks. (Patient taking differently: Inject 140mg (1 pen) into the skin every 2 weeks.)    fexofenadine (ALLEGRA) 180 MG tablet Take 180 mg by mouth once daily.    fluconazole (DIFLUCAN) 200 MG Tab Take 2 tablets (400 mg total) by mouth once daily.    gabapentin (NEURONTIN) 100 MG capsule Take 1 capsule (100 mg total) by mouth 2 (two) times daily.    lancets 30 gauge Misc Use to test blood sugar daily (Patient taking differently: Use to test blood sugar daily)    lisinopriL-hydrochlorothiazide (PRINZIDE,ZESTORETIC) 20-12.5 mg per tablet Take 2 tablets by mouth once daily.    loratadine (CLARITIN) 10 mg tablet Take 10 mg by mouth daily as needed.     magic mouthwash diphen/antac/lidoc Swish and spit 15 mls every 4 (four)  hours as needed.    magnesium 30 mg Tab Take by mouth once.    melatonin 10 mg Tab Take 1 tablet (10 mg total) by mouth once daily.    metFORMIN (GLUCOPHAGE-XR) 500 MG ER 24hr tablet Take 1 tablet (500 mg total) by mouth daily with breakfast.    MULTIVITAMIN ORAL Take by mouth.    ondansetron (ZOFRAN-ODT) 8 MG TbDL Dissolve 1 tablet by mouth every 6 hours.    pantoprazole (PROTONIX) 40 MG tablet Take 1 tablet (40 mg total) by mouth once daily.    potassium chloride SA (K-DUR,KLOR-CON) 20 MEQ tablet Take 1 tablet (20 mEq total) by mouth once daily.    turmeric 400 mg Cap Take 1 capsule by mouth once daily.   Last reviewed on 1/10/2022  1:57 PM by Kelly Cao RN    Review of patient's allergies indicates:   Allergen Reactions    Revlimid [lenalidomide] Swelling     Facial swelling  6/8/17 - in  the process of desensitation    Last reviewed on  1/11/2022 1:26 PM by Noemi Del Valle      Tasks added this encounter   2/2/2022 - Refill Call (Auto Added)   Tasks due within next 3 months   No tasks due.     [facility]  [address]  [place] [address]  Phone: [phone]  Fax: [phone]

## 2022-01-11 NOTE — PLAN OF CARE
Pt here for 1 unit PRBC and D2 Vidaza.  Assessment complete and labs reviewed.  VSS.  Accessed PAC using sterile technique.  Pt tolerated transufion well, no reaction suspected.  Administered Vidaza to abdomen in three injections.  Flushed PAC with NS and heparin prior to de-accessing.  No questions or concerns.  Pt to RTC tomorrow for D2 Vidaza.  Escorted pt in WC to her car.

## 2022-01-14 NOTE — PLAN OF CARE
Pt presented to unit for vidaza injections. Reports no new complaints. Reviewed with pt as to why her Vidaza injections should not be pushed fast. Reviewed that the stomach fatty tissue needed time to absorb the medication properly and that giving the injection too fast would cause the medication not to be absorbed properly. Pt voiced her understanding and tolerated the injections without difficulty.

## 2022-01-18 NOTE — PLAN OF CARE
Pt arrived AAOx3, VSS, hgb 7.2. Pt received 1 PRBC after premeds without incident and was discharged in stable condition with walker to home.

## 2022-01-25 NOTE — TELEPHONE ENCOUNTER
Spoke with pt and notified her that the blood bank does have her blood ready. Pt states that she won't be able to make it today because her dog really isnt doing well and suffering from pancreatitis and the vet told her that it is serious when I dog is diagnosed with that so she cannot miss the appt. Notified pt that we can add her to the wait list for tomorrow but we do not have anything available at this time. Pt verbalized understanding and states she will wait until she hears something.

## 2022-02-04 NOTE — PLAN OF CARE
Pt arrived AAOx3, VSS, labs reviewed, blood transfusion ordered. Pt tolerated 1 PRBC without incident and was discharged after in stable condition to home.

## 2022-02-07 NOTE — TELEPHONE ENCOUNTER
Request received for freestyle shan meter. Usually needs to be on insulin for this to be covered. Can schedule an appt to discuss further.   Also due for a1c.

## 2022-02-07 NOTE — Clinical Note
Please schedule patient for PT here at [facility], she would like to come on a day she is already here. thanks

## 2022-02-07 NOTE — PROGRESS NOTES
"  Subjective:       Patient ID: Susan Puente is a 71 y.o. female.    Interval History: Ms. Puente presents today for routine follow-up for MDS 5 q del syndrome prior to cycle 37 of vidaza as third line therapy. She continues to be dependent on transfusions intermittently. She reports feeling well overall today.  She reports chronic "arthritis" pain, mostly to the arms. She was to start going to [employer] [place] however she states its a taxing trip for her. Has chronic occasional nausea, mostly when she doesn't eat. No recent fevers, chills, chest pain, sob, diarrhea or constipation.    Oncology History (per prior notes from Dr. Valdes)  71 year old female with hx of DM2, peripheral vascular disease, tobacco use, CAD, hyperlipidemia, hypertension who was hospitalized 11/10/16 for anemia. hgb 5.3  MCH 43.4 with normal WBC and platelets. Patient had normal iron stores. She was transfused 3 units of PRBCs and discharged home with hgb 8.7 on 11/11/16. She was referred for further evalutation of her anemia. On 12/16/16 patient had a normal SPEP and immunofixation. Slightly elevated kappa light chains with normal ration. Elevated vitamin b12, normal folate, JAYDEN was positive with a low titer and negative profile. Patient had a bone marrow biopsy 1/5/16 which showed the core biopsy is normocellular for age (40%); however, megakaryocytes are increased and show frequent small, hypolobated forms. Additionally, a subset of the neutrophils are hypogranular. Blasts are not increased by either morphology (1.2%) or in the corresponding flow cytometric analysis. Fish detects a 5q deletion in 54.5% of nuclei. Cytogenetics reported 20 metaphases, 2 metaphases were normal and 18 metaphases had a 5q deletion. No additional cytogenetic abnormalities were detected. Findings consistent with 5q deletion syndrome.     Patient had a delay in obtaining Revlimid 10 mg daily but did start taking the medication 2/8/17. On " 2/9/17 patient developed a diffuse maculopapular rash throughout scalp arms, legs and torso. She states she had no stridor or wheezing. She discontinued medication 2/15/17. Went to allergist who provided references on desensitization so that patient could resume Revlimid. Unfortunately developed pancytopenia and Revlimid stopped 6/16/17. She had a repeat BMBX  performed 6/8/17 and showed a hypercellular marrow (60%) continued atypia in the granulocytes and megakaryocytes were noted.  Additionally, there is erythroid atypia present. No increase in blasts. Cytogenetics are normal and MDS FISH is negative, failing to show 5 q minus. NGS should no significant molecular mutations.  Anemia work up revealed bienvenido negative hemolysis.    Revlimid has been stopped since June 2017 after she developed pancytopenia while on Revlimid (required desensitization). CBC was normal for almost 1 year and marrow was negative for del5q. Bone marrow biopsy repeat from 6/2018 showed relapsed 5q minus MDS without new or additional mutations. Revlimid restarted at 2.5 mg daily with prednisone to prevent allergic reaction. Titrated to a goal of 10mg daily Revlimid. Hospital admission 3/12-3/17/19 for unresponsive event after blood transfusion, found to be profoundly pancytopenic at admission. Since hospital admission Revlimid has been stopped. Repeat marrow March 2019 shows persistent MDS with 5q minus.     Started cycle 1 Vidaza 5/6/19 subq for 5 days every 28 days. Restaging bone marrow biopsy from 10/24/19 shows persistent MDS, no excess blasts and 3 of 20 metaphases with 5q deletion.    Review of Systems   Constitutional: Negative for chills, diaphoresis, fatigue and fever.   HENT: Negative for congestion, ear pain, mouth sores, nosebleeds, rhinorrhea, sinus pressure, sinus pain, sore throat and trouble swallowing.    Eyes: Negative for pain, discharge and redness.   Respiratory: Negative for apnea, cough, chest tightness and shortness of  breath.    Cardiovascular: Negative for chest pain, palpitations and leg swelling.   Gastrointestinal: Positive for nausea (intermittent). Negative for abdominal distention, abdominal pain, blood in stool, constipation, diarrhea and vomiting.   Genitourinary: Negative for dysuria and hematuria.   Musculoskeletal: Positive for arthralgias (mostly shoulders). Negative for back pain, gait problem and joint swelling.   Skin: Negative for rash.   Neurological: Negative for dizziness, tremors, syncope, numbness and headaches.   Psychiatric/Behavioral: Negative for behavioral problems and confusion.         Objective:       Vitals:    02/07/22 1038   BP: (Abnormal) 116/56   Pulse: 90   Resp: 16   Temp: 98.2 °F (36.8 °C)     Physical Exam  Vitals and nursing note reviewed.   Constitutional:       Appearance: She is well-developed.   HENT:      Head: Normocephalic and atraumatic.      Right Ear: External ear normal.      Left Ear: External ear normal.   Eyes:      Conjunctiva/sclera: Conjunctivae normal.   Cardiovascular:      Rate and Rhythm: Normal rate and regular rhythm.      Heart sounds: Normal heart sounds. No murmur heard.      Pulmonary:      Effort: Pulmonary effort is normal. No respiratory distress.      Breath sounds: Normal breath sounds. No stridor. No wheezing or rales.   Abdominal:      General: Bowel sounds are normal. There is no distension.      Palpations: Abdomen is soft.      Tenderness: There is no abdominal tenderness.   Musculoskeletal:         General: Normal range of motion.      Cervical back: Normal range of motion.   Skin:     General: Skin is warm and dry.      Findings: No rash.   Neurological:      Mental Status: She is alert and oriented to person, place, and time.   Psychiatric:         Behavior: Behavior normal.         Thought Content: Thought content normal.         Judgment: Judgment normal.             Assessment:       1. MDS (myelodysplastic syndrome) with 5q deletion    2. Abnormal  CT of the chest    3. Essential hypertension    4. Type 2 diabetes mellitus without complication, unspecified whether long term insulin use    5. Coronary artery disease involving native coronary artery of native heart without angina pectoris    6. Adjustment disorder with mixed anxiety and depressed mood    7. Bilateral sciatica    8. Pain of both shoulder joints        Plan:     MDS  - Primary oncologist, Dr. 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 Developed allergy and was then desensitized. Soon after developed pancytopenia and Revlimid held since June 2017.    - BMBX on 6/8/17 was with normal cytogenetics. 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 Discussed treatment options of HMA therapy or repeat trial of Revlimid and patient wished to proceed with Revlimid   - Revlimid stopped again due to repeat allergic reaction and pancytopenia 3/2019  - Started cycle 1 Vidaza 5/6/19 subq for 5 days every 28 days  - Restaging bone marrow biopsy following cycle 5 from 10/24/19 shows persistent MDS, no excess blasts and 3 of 20 metaphases with 5q deletion  - Tolerated previous cycles well. Package insert for Vidaza recommends holding if ANC <1000, however pt has been receiving this with an ANC below 1000 for each cycle. Okay to continue to give despite neutropenia per Dr. Valdes  - Restaging marrow on 04/21/21 with persistent MDS with isolated del 5Q. Of 20 metaphases, 5 were normal and 15 had a 5q deletion, NGS positive for SF3B1 and TP53 (12%). 1.4% blasts  - Received vidaza for 22 cycles, received Decitabine for C23 due to national shortage of vidaza and then back to vidaza with C24.  - Ok to proceed with cycle 37 today  - Tolerating well, takes zofran PRN for nausea    Cytopenias 2/2 disease: anemia and neutropenia  - Transfuse for hgb < 7  - Continue ppx acyclovir, cipro, fluconazole (only taking 200mg due to previously elevated LFTs)  - discussed stopping ppx antibiotic and  antifungal given ANC >500 for at least past 2 months. Patient wishes to continue these for now  - Transfusion dependent, receives twice weekly labs, has chair on hold for transfusions  - no transfusions needed today    Abnormal CT chest  - presented to the ER 8/27 for chest pain  - No cardiac etiology identified  - improved after blood transfusion  - CTA of chest performed shows 2cm RUL spiculated mass suspicious for a primary lung malignancy in a prior smoker  - Lung biopsy in IR was scheduled on 11/3. Per IR lung nodules almost resolved, cancelled bx.  Repeat CT chest scheduled for 2/17/22    DM2  - Management per PCP  - Continue metformin  - Blood glucoses normal.    HTN  - Management per cardiologist   - Continue lisinopril-HCTZ and coreg  - bp wnl    CAD  - S/P left carotid endarterectomy prior to cancer dx  - Continue Repatha for HLD per PCP (intolerant to statins)  - Continue on ASA 81mg daily, platelets are wnl  - Seen by vascular surgery, Dr. Murguia. No intervention needed as asymptomatic    Adjustment disorder  - Improved mood on Celexa  - previously followed by psych onc     Myalgias/possible sciatica/Neuropathy  - Started trial of gabapentin 100 mg bid 11/4/19 with improvement  - taking Tylenol PRN for pain, encouraged to use cautiously as to not mask potential fevers  - Return to [facility] for water aerobics once able, patient reports this has been difficult for her to achieve  - will refer to PT here at [facility]    Follow up:  - Schedule labs twice weekly (CBC, type and screen; add weekly CMP) Monday and Thursday for March  - Schedule infusion chair Tuesday and Friday for possible transfusions  - Schedule return visit with CBC, CMP, T&S, and appt with Dr. Valdes on 3/7/22  - Schedule chemo chair for next cycle of vidaza from 3/7-3/11    Beatriz King NP  Hematology/Oncology/BMT

## 2022-02-07 NOTE — Clinical Note
- Schedule labs twice weekly (CBC, type and screen; add weekly CMP) Monday and Thursday for March - Schedule infusion chair Tuesday and Friday for possible transfusions - Schedule return visit with CBC, CMP, T&S, and appt with Dr. Valdes on 3/7/22 - Schedule chemo chair for next cycle of vidaza from 3/7-3/11

## 2022-02-07 NOTE — NURSING
Pt arrived for vidaza D1 following MD appt. Pt tolerated injection SQ X3 to RLA.  Pt states already took her own zofran.  Pt discharged to home using rolling walker

## 2022-02-08 NOTE — NURSING
Patient received Vidaza injection SQ to ABD x 3.  Tolerated well.  Vitals stable.  No apparent distress noted.  Ambulated off unit with steady gait using walker.

## 2022-02-09 NOTE — NURSING
Pt here for vidaza injections. Tolerated injections X3 to the lower left abdomen without difficulty.

## 2022-02-10 PROBLEM — M75.40 SHOULDER IMPINGEMENT SYNDROME, UNSPECIFIED LATERALITY: Status: ACTIVE | Noted: 2022-01-01

## 2022-02-10 PROBLEM — R29.3 POOR POSTURE: Status: ACTIVE | Noted: 2022-01-01

## 2022-02-10 PROBLEM — R29.898 WEAKNESS OF BOTH UPPER EXTREMITIES: Status: ACTIVE | Noted: 2022-01-01

## 2022-02-10 PROBLEM — M25.619 DECREASED RANGE OF MOTION OF SHOULDER: Status: ACTIVE | Noted: 2022-01-01

## 2022-02-10 NOTE — PATIENT INSTRUCTIONS
ROM: Flexion - Wand (Supine)        Lie on back holding wand. Raise arms over head.   Repeat 10 times per set. Do 2 sets per session. Do 2 sessions per day.     [email or web address]     Copyright © [employer]. All rights reserved.     Scapular Retraction (Standing)        With arms at sides, pinch shoulder blades together.  Repeat 10 times per set. Do 2 sets per session. Do 2 sessions per day.     [email or web address]     Copyright © [employer]. All rights reserved.         90/90 External Rotation Supine with Wand    Laying down with upper arms out perpendicular (90 degrees) to your body and supported on foam pads or pillows. Hold a wand with a  slightly greater than shoulder distance and your elbows also bent at 90 degrees. Bring your hands down and overhead as if you were pulling bedsheets over your head. Return to start position and repeat as directed.  Do 10 reps per set. Do 2 sets per session. Do 2 session per day.         Supine pec stretch    Lie on back with arms out to sides for a stretch across the chest. Hold 30 seconds, do 3 times per session. Do 2 session per day.

## 2022-02-10 NOTE — NURSING
Pt here for vidaza injections. Tolerated all 3 injections to lower right abdomen without difficulty.

## 2022-02-10 NOTE — PLAN OF CARE
"[facility]  Physical Therapy Initial Evaluation    Name: Susan Puente  Clinic Number: [id number]    Therapy Diagnosis:   Encounter Diagnoses   Name Primary?    Pain of both shoulder joints     Weakness of both upper extremities     Poor posture     Shoulder impingement syndrome, unspecified laterality     Decreased range of motion of shoulder, unspecified laterality      Physician: Beatriz King, NP    Physician Orders: PT Eval and Treat   Medical Diagnosis from Referral: Pain of both shoulder joints  Evaluation Date: 2/10/2022  Authorization Period Expiration: 2/7/2023  Plan of Care Expiration: 4/7/2022  Visit # / Visits authorized: 1/ 1  Insurance: [employer] MANAGED MEDICARE    Time In: 2:04 pm  Time Out: 3:04 pm  Total Billable Time: 60 minutes    Precautions: Standard, Diabetes and cancer    Subjective   Date of onset: chronic  History of current condition - Susan reports: her right shoulder bothers her more than left shoulder at this time. She has been having "arthitic pains" since she started her chemo. She feels she has lost all upper body strength. 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 She also reports a history of falls, the moist recent was 2-3 months ago. She feel while doing some meal prep in her kitchen.    Cancer Related Surgery and Date: bone marrow biopsy 6/7/18, 3/21/19, 10/24/19, 2/19/20, 4/21/21    Chemotherapy: Revlimid 10 mg daily did start taking the medication 2/8/17. On " 2/9/17 patient developed a diffuse maculopapular rash throughout scalp arms, legs and torso. She states she had no stridor or wheezing. She discontinued medication 2/15/17. Went to allergist who provided references on desensitization so that patient could resume Revlimid. Unfortunately developed pancytopenia and Revlimid stopped 6/16/17. Revlimid restarted at 2.5 mg daily with prednisone to prevent allergic reaction. Titrated to a goal of 10mg daily Revlimid. Hospital admission 3/12-3/17/19 for unresponsive event after blood transfusion, found to be profoundly pancytopenic at admission. Since hospital admission Revlimid has been stopped. Started cycle 1 Vidaza 5/6/19 subq for 5 days every 28 days.   Radiation: N/A     Medical History:   Past Medical History:   Diagnosis Date    Allergic rhinitis     Allergy     Anemia     Anxiety     CAD (coronary artery disease)     Carotid stenosis     Colon polyps 2016    Controlled type 2 diabetes mellitus without complication, without long-term current use of insulin 10/19/2016    Depression     GERD (gastroesophageal reflux disease)     Hearing loss in right ear     Hx of psychiatric care     Celexa, Prozac    MDS (myelodysplastic syndrome) with 5q deletion     Psychiatric problem     Therapy        Surgical History:   Susan Puente  has a past surgical history that includes Tonsillectomy; Carotid stent; Carotid endarterectomy (Left, 2011); Bunionectomy; Endometrial ablation; Colonoscopy (N/A, 12/20/2016); Tympanostomy tube placement; Bone marrow biopsy (Left, 6/7/2018); Bone marrow biopsy (Left, 3/21/2019); Bone marrow biopsy (Left, 10/24/2019); Insertion of tunneled central venous catheter (CVC) with subcutaneous port (N/A, 2/19/2020); and Bone marrow biopsy (Left, 4/21/2021).    Medications:   Susan has a current medication list which includes the following prescription(s): acetaminophen, acyclovir, aspirin, azacitidine, azacitidine, blood sugar  diagnostic, carvedilol, ciprofloxacin hcl, ciprofloxacin hcl, citalopram, citalopram, repatha sureclick, fexofenadine, fluconazole, gabapentin, lancets, lisinopril-hydrochlorothiazide, loratadine, magic mouthwash diphen/antac/lidoc, magnesium, melatonin, metformin, multivitamin, ondansetron, pantoprazole, potassium chloride sa, and turmeric, and the following Facility-Administered Medications: sodium chloride and diphenhydramine.    Allergies:   Review of patient's allergies indicates:   Allergen Reactions    Revlimid [lenalidomide] Swelling     Facial swelling  6/8/17 - in the process of desensitation        Imaging, CT scan films: NM PET CT ROUTINE 10/07/2021  Impression:     Irregular right upper lobe pulmonary nodule demonstrating nonspecific low level radiotracer uptake.  Allowing for differences in technique, lesion appears smaller compared to prior exam.  Differential includes an infectious or inflammatory process versus primary bronchogenic malignancy.  Clinical considerations will determine whether lesion undergoes tissue sampling versus surveillance.    Prior Therapy: none  Social History: 2 story condo, bedroom on second floor, lives alone  Occupation: retired  Prior Level of Function: independent  Current Level of Function: Mod I  Dominant hand:  left     Pain:  Current 6/10, worst 9/10, best 4/10   Location: bilateral shoulder   Description: Aching and Like fog just covers you  Aggravating Factors: Lifting and reaching  Easing Factors: pain medication, ice, hot bath, TENS unit and rest    Functional Mobility (Bed mobility, transfers)  Bed mobility: I  Supine to sit: I  Sit to supine: I  Transfers to bed: I  Transfers to toilet: I  Sit to stand:  I  Car transfers: I    ADL's:  Feeding: I  Grooming: I  Hygiene: Mod I  UB Dressing: Mod I  LB Dressing: Mod I  Toileting: I  Bathing: I    Pts goals: to learn some exercises that I can do at home that will reduce the pain      Objective     Lab Values    Platelets: 299  ANC: 1.4  Hematacrit: 24.2  Hemoglobin: 7.6    Mental status: alert, oriented to person, place, and time, normal mood, behavior, speech, dress, motor activity, and thought processes, affect appropriate to mood  Appearance: Casually dressed  Behavior:  calm, cooperative and adequate rapport can be established  Attention Span and Concentration:  Normal    Postural examination/scapula alignment: Rounded shoulder, Head forward and Slouched posture      Sensation:   Light Touch UEs  Intact  Light Touch LEs  Intact  Proprioception: Intact,            ROM:     Active/Passive ROM: (measured in degrees)     Shoulder RUE LUE Pain/Dysfunction with movement   Flexion 165 168 Pulling under arms, front of shoulder   Abduction 160 170 Pulling along biceps   Extension 70 70    ER C7 C7    IR T8 T8      Elbow RUE LUE Pain/Dysfunction with movement   Flexion WFL WFL N/A   Extension WFL WFL N/A     Hip Right Left Pain/Dysfunction with Movement   Flexion WFL WFL N/A   Extension NT NT N/A   Abduction WFL WFL N/A   Adduction WFL WFL N/A   Internal rotation WFL WFL N/A   External rotation WFL WFL N/A      Knee Right Left Pain/Dysfunction with Movement   Flexion WFL WFL N/A   Extension WFL WFL N/A     Ankle Right Left Pain/Dysfunction with Movement   Plantarflexion WFL WFL N/A   Dorsiflexion WFL WFL N/A   Inversion WFL WFL N/A   Eversion WFL WFL N/A         Strength: manual muscle test grades below   Upper Extremity Strength  (R) UE  (L) UE    Shoulder Flexion: 3+/5 Shoulder Flexion: 3+/5   Shoulder Abduction: 3+/5 Shoulder Abduction: 3+/5   Shoulder Internal Rotation: 4/5 Shoulder Internal Rotation: 4/5   Shoulder External Rotation 4/5 Shoulder External Rotation: 4/5   Elbow Flexion: 4+/5 Elbow Flexion: 5/5   Elbow Extension: 4+/5 Elbow Extension: 5/5   Wrist Flexion: 4/5 Wrist Flexion: 4+/5   Wrist Extension: 4/5 Wrist Extension: 4+/5     Lower Extremity Strength  Right LE  Left LE    Hip Flexion: 3+/5 Hip Flexion: 3+/5    Hip Extension:  NT Hip Extension: NT   Hip Abduction: 4/5 Hip Abduction: 4/5   Hip Adduction: 4/5/ Hip Adduction 4/5   Knee Extension: 4/5 Knee Extension: 4/5   Knee Flexion: 5/5 Knee Flexion: 5/5   Ankle Dorsiflexion: 5/5 Ankle Dorsiflexion: 5/5   Ankle Plantarflexion: 5/5 Ankle Plantarflexion: 5/5       Special Tests      Strength (in pounds, setting II one maximum trial):   Left Right    II 36.3 37.9     Normal  Average Values  Female Right Left Male: Right Left   20-29 66 lbs 62 lbs         94 lbs 86 lbs   30-39 68 lbs 64 lbs 90 lbs 82 lbs   40-49 64 lbs 62 lbs 80 lbs 74 lbs   50-59 62 lbs 57 lbs 72 lbs 65 lbs   60-69 53 lbs 51 lbs 63 lbs 56 lbs   70+ 44 lbs 42 lbs 54 lbs 48 lbs     Special Tests:  AC Joint Left Right   AC Joint Compression Test negative negative   Empty Can Test negative negative   Drop Arm test negative negative   Subscaputlaris Lift Off positive positive   Hawkin's Kenndy negative negative   Sulcus Sign negative negative       Joint Mobility: B A/P hypomobile glenohumeral    Flexibility: Bilateral AC joint 4 fingers from table - indicating pec tightness    Scapular Control/Dyskinesis:    Normal / Subtle / Obvious  Comments    Left  normal N/A   Right  normal N/A       GAIT DEVIATIONS:  Susan displays the following deviations with ambulation: forward flexed posture    Impairments contributing to deviations: impaired motor control, weakness      CMS Impairment/Limitation/Restriction for FOTO Upper Extremity Survey    Therapist reviewed FOTO scores for Susan Puente on 2/10/2022.   FOTO documents entered into [employer] - see Media section.    Limitation Score: 36%  Category: Carrying           TREATMENT   Treatment Time In: 2:54 pm  Treatment Time Out: 3:04 pm  Total Treatment time separate from Evaluation: 10 minutes    Susan received therapeutic exercises to develop strength and ROM for 10 minutes including:  Supine wand flexion 1 x 10  Supine wand ER at 90 degrees abd 1 x  "10  Scapula retraction 1 x 10  Supine pec stretch 2 x 30"    Home Exercises and Patient Education Provided    Education provided:   - compliance with HEP  - role of PT and goals for PT     Written Home Exercises Provided: yes.  Exercises were reviewed and Susan was able to demonstrate them prior to the end of the session.  Susan demonstrated good  understanding of the education provided.     See EMR under Patient Instructions for exercises provided 2/10/2022.    Assessment   Susan is a 71 y.o. female referred to outpatient Physical Therapy with a medical diagnosis of Pain of both shoulder joints. Pt presents with weakness of both upper extremities, decreased ROM of both shoulders, poor posture, and tissue tightness.    Pt prognosis is Good.   Pt will benefit from skilled outpatient Physical Therapy to address the deficits stated above and in the chart below, provide pt/family education, and to maximize pt's level of independence.     Plan of care discussed with patient: Yes  Pt's spiritual, cultural and educational needs considered and patient is agreeable to the plan of care and goals as stated below:     Anticipated Barriers for therapy: none anticipated    Medical Necessity is demonstrated by the following  History  Co-morbidities and personal factors that may impact the plan of care Co-morbidities:   anxiety, diabetes, difficulty sleeping, history of cancer, HTN and level of undertstanding of current condition    Personal Factors:   no deficits     moderate   Examination  Body Structures and Functions, activity limitations and participation restrictions that may impact the plan of care Body Regions:   upper extremities    Body Systems:    ROM  strength  motor control  motor learning  flexibility    Participation Restrictions:   none    Activity limitations:   Learning and applying knowledge  no deficits    General Tasks and Commands  no deficits    Communication  no deficits    Mobility  lifting and carrying " objects  walking  ascending/descending stairs    Self care  caring for body parts (brushing teeth, shaving, grooming)  dressing    Domestic Life  shopping  cooking  doing house work (cleaning house, washing dishes, laundry)    Interactions/Relationships  no deficits    Life Areas  no deficits    Community and Social Life  community life  recreation and leisure         moderate   Clinical Presentation evolving clinical presentation with changing clinical characteristics moderate   Decision Making/ Complexity Score: moderate     Goals:  Short Term Goals:  4 weeks  1. Pt will increase AROM of bilateral shoulder flexion to 180 to promote greater ease with performing overhead activities (progressing, not met)  2. Pt to demonstrate increased strength of bilateral upper extremities to 4/5 in order to perform functional activities (progressing, not met)  3. Pt will initiate home exercise program to improve strength, flexibility, endurance, mobility & balance to return pt to PLOF (progressing, not met)  4. Pt will tolerate 5 min or greater of time in light-->moderate intensity cardio (I.e. UBE) to improve endurance to assist in usual household duties. (progressing, not met)    Long Term Goals: 8 weeks  1. Pt will increase AROM of bilateral shoulder abduction to 180 degrees in order to perform upper body dressing with no complaints of pain. (progressing, not met)  2 Pt will increase strength of  bilateral upper extremities to 5/5  in order to perform functional activities independently  (progressing, not met)  3. Pt will be independent with HEP to improve ROM, strength, balance,  and independence with ADL's (progressing, not met)  4. Pt will be independent with postural awareness/corrections for improved functional mobility. (progressing, not met)    .   Plan   Plan of care Certification: 2/10/2022 to 4/10/2022.    Outpatient Physical Therapy 2 times weekly for 8 weeks to include the following interventions: Manual Therapy,  Neuromuscular Re-ed, Patient Education, Self Care, Therapeutic Activities and Therapeutic Exercise.     Sonai Robledo, PT

## 2022-02-11 NOTE — NURSING
Pt tolerated 3 Vidaza injections to the abdomen today. NAD.declined AVS. Uses my [facility]. Discharged home. Ambulated independently with walker.

## 2022-02-11 NOTE — TELEPHONE ENCOUNTER
Repatha PA submitted and approved via [facility]. PA Case: [id number], Status: Approved, Coverage Starts on: 1/1/2022 12:00:00 AM, Coverage Ends on: 12/31/2022 12:00:00 AM. Questions? Contact [phone].    Specialty Pharmacy - Refill Coordination  Specialty Pharmacy - Medication/Referral Authorization    Specialty Medication Orders Linked to Encounter    Flowsheet Row Most Recent Value   Medication #1 evolocumab (REPATHA SURECLICK) 140 mg/mL PnIj (Order#[id number], Rx#[id number])          Refill Questions - Documented Responses    Flowsheet Row Most Recent Value   Patient Availability and HIPAA Verification    Does patient want to proceed with activity? Yes   HIPAA/medical authority confirmed? Yes   Relationship to patient of person spoken to? Self   Refill Screening Questions    Would patient like to speak to a pharmacist? No   When does the patient need to receive the medication? 02/14/22   Refill Delivery Questions    How will the patient receive the medication? Delivery Patricia   When does the patient need to receive the medication? 02/14/22   Shipping Address Home   Address in [facility] confirmed and updated if neccessary? Yes   Expected Copay ($) 0   Is the patient able to afford the medication copay? Yes   Payment Method zero copay   Days supply of Refill 28   Supplies needed? No supplies needed   Refill activity completed? No   Refill activity plan Refill scheduled   Shipment/Pickup Date: 02/14/22          Current Outpatient Medications   Medication Sig    acetaminophen (TYLENOL) 650 MG TbSR Take 650 mg by mouth every 8 (eight) hours as needed (for pain).    acyclovir (ZOVIRAX) 400 MG tablet Take 1 tablet (400 mg total) by mouth 2 (two) times daily.    aspirin (ECOTRIN) 81 MG EC tablet Take 1 tablet (81 mg total) by mouth once daily.    azacitidine (VIDAZA INJ) Inject as directed every 28 days.    azaCITIDine (VIDAZA) 100 mg SolR chemo injection     blood sugar diagnostic Strp To check blood sugar 1  times daily (Patient taking differently: To check blood sugar 1 times daily)    carvediloL (COREG) 12.5 MG tablet Take 1 tablet (12.5 mg total) by mouth 2 (two) times daily with meals.    ciprofloxacin HCl (CIPRO) 500 MG tablet Take 1 tablet (500 mg total) by mouth 2 (two) times daily.    ciprofloxacin HCl (CIPRO) 500 MG tablet Take 1 tablet (500 mg total) by mouth 2 (two) times daily.    citalopram (CELEXA) 20 MG tablet Take 1 tablet (20 mg total) by mouth once daily.    citalopram (CELEXA) 20 MG tablet Take 1 tablet (20 mg total) by mouth once daily.    evolocumab (REPATHA SURECLICK) 140 mg/mL PnIj Inject 140mg (1 pen) into the skin every 2 weeks. (Patient taking differently: Inject 140mg (1 pen) into the skin every 2 weeks.)    fexofenadine (ALLEGRA) 180 MG tablet Take 180 mg by mouth once daily.    fluconazole (DIFLUCAN) 200 MG Tab Take 2 tablets (400 mg total) by mouth once daily.    gabapentin (NEURONTIN) 100 MG capsule Take 1 capsule (100 mg total) by mouth 2 (two) times daily.    lancets 30 gauge Misc Use to test blood sugar daily (Patient taking differently: Use to test blood sugar daily)    lisinopriL-hydrochlorothiazide (PRINZIDE,ZESTORETIC) 20-12.5 mg per tablet Take 2 tablets by mouth once daily.    loratadine (CLARITIN) 10 mg tablet Take 10 mg by mouth daily as needed.     magic mouthwash diphen/antac/lidoc Swish and spit 15 mls every 4 (four)  hours as needed.    magnesium 30 mg Tab Take by mouth once.    melatonin 10 mg Tab Take 1 tablet (10 mg total) by mouth once daily.    metFORMIN (GLUCOPHAGE-XR) 500 MG ER 24hr tablet Take 1 tablet (500 mg total) by mouth daily with breakfast.    MULTIVITAMIN ORAL Take by mouth.    ondansetron (ZOFRAN-ODT) 8 MG TbDL Dissolve 1 tablet by mouth every 6 hours.    pantoprazole (PROTONIX) 40 MG tablet Take 1 tablet (40 mg total) by mouth once daily.    potassium chloride SA (K-DUR,KLOR-CON) 20 MEQ tablet Take 1 tablet (20 mEq total) by mouth once  daily.    turmeric 400 mg Cap Take 1 capsule by mouth once daily.   Last reviewed on 2/9/2022 11:58 AM by Hernan Fajardo RN    Review of patient's allergies indicates:   Allergen Reactions    Revlimid [lenalidomide] Swelling     Facial swelling  6/8/17 - in the process of desensitation    Last reviewed on  2/9/2022 11:57 AM by Hernan Fajardo      Tasks added this encounter   2/11/2022 - Welcome Call  2/11/2022 - Referral Authorization   Tasks due within next 3 months   2/2/2022 - Refill Call (Auto Added)     Alessandro, PharmD  [facility]  [address]  [facility] [address]  Phone: [phone]  Fax: [phone]

## 2022-02-14 NOTE — CODE/ RAPID DOCUMENTATION
RAPID RESPONSE NURSE NOTE        Admit Date: 2022  LOS: 0  Code Status: DNR   Date of Consult: 2022  : 1950  Age: 71 y.o.  Weight:   Wt Readings from Last 1 Encounters:   22 70.8 kg (155 lb 15.6 oz)     Sex: female  Race: White   Bed: ED :   MRN: [id number]  Time Rapid Response Team page Received: 1601  Time Rapid Response Team at Bedside: 1603  Time Rapid Response Team left Bedside: 1616  Was the patient discharged from an ICU this admission? No   Was the patient discharged from a PACU within last 24 hours? No   Did the patient receive conscious sedation/general anesthesia in last 24 hours? No  Was the patient in the ED within the past 24 hours? Yes  Was the patient on NIPPV within the past 24 hours? No   Did this progress into an ARC or CPA: no  Attending Physician: Shahram Lujan MD  Primary Service: Emergency Medicine       SITUATION    Notified by overhead page.  Reason for alert: Hypotension  Called to evaluate the patient for Circulatory    BACKGROUND     Why is the patient in the hospital?: <principal problem not specified>    Patient has a past medical history of Allergic rhinitis, Allergy, Anemia, Anxiety, CAD (coronary artery disease), Carotid stenosis, Colon polyps, Controlled type 2 diabetes mellitus without complication, without long-term current use of insulin, Depression, GERD (gastroesophageal reflux disease), Hearing loss in right ear, psychiatric care, MDS (myelodysplastic syndrome) with 5q deletion, Psychiatric problem, and Therapy.    Last Vitals:  Pulse: 79 (1612)  Resp: 16 (1612)  BP: 70/40 (1612)  SpO2: 95 % (1612)    24 Hours Vitals Range:  Pulse:  [79]   Resp:  [16]   BP: (70)/(40)   SpO2:  [95 %]     Labs:  No results for input(s): CBC, WBC, HGB, HCT, PLT in the last 72 hours.    No results for input(s): NA, K, CL, CO2, CREATININE, GLU, PHOS, MG in the last 72 hours.    Invalid input(s): CMP,  BUN, TBIL     No results for input(s): PH,  PCO2, PO2, HCO3, POCSATURATED, BE in the last 72 hours.     ASSESSMENT    Physical Exam  Constitutional:       General: She is in acute distress.      Appearance: She is ill-appearing and diaphoretic.   Cardiovascular:      Pulses:           Carotid pulses are 1+ on the left side.  Neurological:      Mental Status: She is lethargic.      Comments: Responsive to pain only.       INTERVENTIONS    Patient transported to ED via wheelchair with RN and RT.    RECOMMENDATIONS    We recommend: ED Evaluation.    PROVIDER ESCALATION    Orders received and case discussed with NA.    Disposition: Tx to ER bed 2.    FOLLOW UP    Charge RNBlaise and bedside RNDeb updated on plan of care. Instructed to call the Rapid Response Nurse, Maya Linder RN at [phone] for additional questions or concerns.

## 2022-02-14 NOTE — ED NOTES
I-STAT Chem-8+ Results:   Value Reference Range   Sodium 137 136-145 mmol/L   Potassium  4.7 3.5-5.1 mmol/L   Chloride 102  mmol/L   Ionized Calcium 1.29 1.06-1.42 mmol/L   CO2 (measured) 25 23-29 mmol/L   Glucose 154  mg/dL   BUN 32 6-30 mg/dL   Creatinine 1.2 0.5-1.4 mg/dL   Hematocrit 19 36-54%

## 2022-02-14 NOTE — ED NOTES
"Patient presents to ER via CODE BLUE called overhead with Rapid Response Team unresponsive, pale, slumped over in wheelchair unresponsive. MD Lujan at bedside.PAtient transferred from wheelchair to ED stretcher pt opens eyes, + strong palpable pulse. Pt placed on defib pads, continuous cardiac monitor, pulse ox and BP cycling at this time. Per RR team CODE BLUE called at [facility] for lab draw when she "felt hot and overheated and passed out" Pt currently awake, alert and able to answer questions and follow simple commands currently. Pt denies CP, SOB, dizziness, fever or chills. Pt reports + intermittent diarrhea x several days and increased generalized weakness and fatigue. Pt reports + active chemo last treatment on Friday ( next treatment is in 28 days).   "

## 2022-02-14 NOTE — ED PROVIDER NOTES
Encounter Date: 2/14/2022       History     Chief Complaint   Patient presents with    unresponsive     Pt presents to ER with Rapid Response team CODE BLUE, unresponsive.      72yo female with MDS, coronary artery disease, DM, GERD presents after code blue called for patient being found down, unresponsive at bedside cancer center.  Patient was at the cancer center for infusion for her MDS.  Patient arrives answering questions, however she was reportedly unresponsive when code blue team arrived.  Patient was brought straight to ED, was found to be hypotensive on arrival.  Patient complains of fatigue but denies any chest pain, shortness of breath.  Patient does report recent diarrhea without any black or bloody stools.  Patient was feeling nauseated prior to arrival but feels better now.    The history is provided by the patient and medical records. The history is limited by the condition of the patient.     Review of patient's allergies indicates:   Allergen Reactions    Revlimid [lenalidomide] Swelling     Facial swelling  6/8/17 - in the process of desensitation     Past Medical History:   Diagnosis Date    Allergic rhinitis     Allergy     Anemia     Anxiety     CAD (coronary artery disease)     Carotid stenosis     Colon polyps 2016    Controlled type 2 diabetes mellitus without complication, without long-term current use of insulin 10/19/2016    Depression     GERD (gastroesophageal reflux disease)     Hearing loss in right ear     Hx of psychiatric care     Celexa, Prozac    MDS (myelodysplastic syndrome) with 5q deletion     Psychiatric problem     Therapy      Past Surgical History:   Procedure Laterality Date    BONE MARROW BIOPSY Left 6/7/2018    Procedure: Biopsy-bone marrow;  Surgeon: Gita Valdes MD;  Location: [facility] OR [address] [place] [address];  Service: Oncology;  Laterality: Left;    BONE MARROW BIOPSY Left 3/21/2019    Procedure: Biopsy-bone marrow;  Surgeon: Gita Valdes MD;  Location: [facility] OR [facility]  FLR;  Service: Oncology;  Laterality: Left;    BONE MARROW BIOPSY Left 10/24/2019    Procedure: Biopsy-bone marrow;  Surgeon: Gita Valdes MD;  Location: [facility] OR 2ND FLR;  Service: Oncology;  Laterality: Left;  left iliac crest    BONE MARROW BIOPSY Left 2021    Procedure: Biopsy-bone marrow;  Surgeon: Gita Valdes MD;  Location: [facility] ENDO (4TH FLR);  Service: Oncology;  Laterality: Left;    BUNIONECTOMY      CAROTID ENDARTERECTOMY Left     CAROTID STENT      COLONOSCOPY N/A 2016    Procedure: COLONOSCOPY;  Surgeon: PATRICIA Simpson MD;  Location: [facility] ENDO (4TH FLR);  Service: Endoscopy;  Laterality: N/A;  pt has vomiting with anesthesia in past    ENDOMETRIAL ABLATION      for endometriosis    INSERTION OF TUNNELED CENTRAL VENOUS CATHETER (CVC) WITH SUBCUTANEOUS PORT N/A 2020    Procedure: [id number];  Surgeon: Deepa Diagnostic Provider;  [facility];  Service: Radiology;  Laterality: N/A;  189    TONSILLECTOMY      TYMPANOSTOMY TUBE PLACEMENT       Family History   Problem Relation Age of Onset    Heart disease Mother     Hyperlipidemia Mother     Heart disease Father     Alcohol abuse Father     Cancer Paternal Grandmother         smoker; lung?    Alcohol abuse Brother         recovering    Colon cancer Neg Hx     Breast cancer Neg Hx      Social History     Tobacco Use    Smoking status: Former Smoker     Packs/day: 0.50     Years: 36.00     Pack years: 18.00     Types: Cigarettes, Vaping with nicotine     Quit date: 3/3/2017     Years since quittin.9    Smokeless tobacco: Never Used   Substance Use Topics    Alcohol use: No    Drug use: No     Review of Systems   Constitutional: Positive for fatigue. Negative for chills and fever.   HENT: Negative for rhinorrhea and sore throat.    Eyes: Negative for photophobia and visual disturbance.   Respiratory: Negative for cough, chest tightness and shortness of breath.    Cardiovascular: Negative for  chest pain and palpitations.   Gastrointestinal: Positive for diarrhea and nausea. Negative for vomiting.   Genitourinary: Negative for difficulty urinating and dysuria.   Musculoskeletal: Negative for back pain and myalgias.   Skin: Negative for pallor and rash.   Neurological: Negative for dizziness, weakness, numbness and headaches.   Psychiatric/Behavioral: Negative for agitation and confusion.       Physical Exam     Initial Vitals   BP Pulse Resp Temp SpO2   02/14/22 1612 02/14/22 1612 02/14/22 1612 02/14/22 1634 02/14/22 1612   (!) 70/40 79 16 (S) 97.6 °F (36.4 °C) 95 %      MAP       --                Physical Exam    Nursing note and vitals reviewed.  Constitutional:   Somnolent, ill-appearing, answering questions and following commands   HENT:   Head: Normocephalic and atraumatic.   Oropharynx dry   Eyes: Conjunctivae and EOM are normal.   Cardiovascular: Regular rhythm, normal heart sounds and intact distal pulses.   Pulmonary/Chest: Breath sounds normal. No stridor. No respiratory distress.   Venous access port to right chest   Abdominal: Abdomen is soft. She exhibits no distension. There is no abdominal tenderness.   Musculoskeletal:         General: No tenderness or edema.     Neurological: She is alert.   Opens eyes to voice  Diffuse weakness  Following commands   Skin: Skin is warm and dry. No rash noted.   Psychiatric: She has a normal mood and affect. Thought content normal.         ED Course   Procedures  Labs Reviewed   CBC W/ AUTO DIFFERENTIAL - Abnormal; Notable for the following components:       Result Value    WBC 2.47 (*)     RBC 2.17 (*)     Hemoglobin 6.7 (*)     Hematocrit 20.5 (*)     RDW 16.3 (*)     Immature Granulocytes 3.6 (*)     Gran # (ANC) 1.2 (*)     Immature Grans (Abs) 0.09 (*)     Lymph # 0.9 (*)     Mono # 0.1 (*)     Mono % 3.2 (*)     All other components within normal limits   COMPREHENSIVE METABOLIC PANEL - Abnormal; Notable for the following components:    Glucose 156  (*)     BUN 31 (*)     Albumin 3.4 (*)     AST 52 (*)     ALT 94 (*)     eGFR if  47.7 (*)     eGFR if non  41.4 (*)     All other components within normal limits   LACTIC ACID, PLASMA - Abnormal; Notable for the following components:    Lactate (Lactic Acid) 4.9 (*)     All other components within normal limits   POCT GLUCOSE - Abnormal; Notable for the following components:    POCT Glucose 167 (*)     All other components within normal limits   CULTURE, BLOOD   CULTURE, BLOOD   LACTIC ACID, PLASMA   URINALYSIS, REFLEX TO URINE CULTURE    Narrative:     Specimen Source->Urine   PROTIME-INR   B-TYPE NATRIURETIC PEPTIDE   TROPONIN I   PROCALCITONIN   MAGNESIUM   SARS-COV-2 RDRP GENE    Narrative:     This test utilizes isothermal nucleic acid amplification   technology to detect the SARS-CoV-2 RdRp nucleic acid segment.   The analytical sensitivity (limit of detection) is 125 genome   equivalents/mL.   A POSITIVE result implies infection with the SARS-CoV-2 virus;   the patient is presumed to be contagious.     A NEGATIVE result means that SARS-CoV-2 nucleic acids are not   present above the limit of detection. A NEGATIVE result should be   treated as presumptive. It does not rule out the possibility of   COVID-19 and should not be the sole basis for treatment decisions.   If COVID-19 is strongly suspected based on clinical and exposure   history, re-testing using an alternate molecular assay should be   considered.   This test is only for use under the Food and Drug   Administration s Emergency Use Authorization (EUA).   Commercial kits are provided by [employer].   Performance characteristics of the EUA have been independently   verified by [facility] Department of   Pathology and Laboratory Medicine.   _________________________________________________________________   The authorized Fact Sheet for Healthcare Providers and the authorized Fact   Sheet for Patients  of the ID NOW COVID-19 are available on the FDA   website:     https://www.fda.gov/media/903152/download  https://www.fda.gov/media/231471/download         TYPE & SCREEN   PREPARE RBC SOFT     EKG Readings: (Independently Interpreted)   Sinus rhythm, regular, narrow, rate of 72, normal axis, no ST deviations, unchanged compared to previous       Imaging Results          X-Ray Chest AP Portable (Final result)  Result time 02/14/22 17:28:19    Final result by Con Coon MD (02/14/22 17:28:19)                 Impression:      No detrimental change or radiographic acute intrathoracic process seen on this limited single view.  Specifically, no focal consolidation.      Electronically signed by: Con Coon MD  Date:    02/14/2022  Time:    17:28             Narrative:    EXAMINATION:  XR CHEST AP PORTABLE    CLINICAL HISTORY:  Sepsis;    TECHNIQUE:  Single frontal view of the chest was performed.    COMPARISON:  Chest radiograph 08/27/2021    FINDINGS:  Monitoring leads overlie the chest.  Presumed pacer pads overlie the mid to left upper chest and mediastinum limiting evaluation.  Patient is slightly rotated.  Large body habitus.    Right upper chest Port-A-Cath stable.  Cardiomediastinal silhouette is midline and prominent noting calcification and tortuosity of the aorta and enlarged cardiac silhouette similar to prior.  Chronic mild nonspecific elevation of the right hemidiaphragm.  Few scattered linear opacities throughout each lung consistent with minimal platelike scarring versus atelectasis.  The lungs are otherwise well expanded without large consolidation, pleural effusion or pneumothorax.  No acute osseous process seen.  PA and lateral views can be obtained.                                 Medications   0.9%  NaCl infusion (for blood administration) (has no administration in time range)   sodium chloride 0.9% bolus 1,000 mL (0 mLs Intravenous Stopped 2/14/22 6439)   piperacillin-tazobactam 4.5 g in sodium  "chloride 0.9% 100 mL IVPB (ready to mix system) (0 g Intravenous Stopped 2/14/22 1923)   vancomycin 2 g in dextrose 5 % 500 mL IVPB (0 mg Intravenous Stopped 2/14/22 2142)   ondansetron injection 4 mg (4 mg Intravenous Given 2/14/22 2001)   acetaminophen tablet 500 mg (500 mg Oral Given 2/14/22 2003)   diphenhydrAMINE injection 12.5 mg (12.5 mg Intravenous Given 2/14/22 2002)     Medical Decision Making:   History:   I obtained history from: someone other than patient.  Old Medical Records: I decided to obtain old medical records.  Initial Assessment:   72yo female with MDS, coronary artery disease, DM, GERD presents after code blue called for patient being found down, found hypotensive and obtunded on arrival  Independently Interpreted Test(s):   I have ordered and independently interpreted EKG Reading(s) - see prior notes  Clinical Tests:   Lab Tests: Ordered and Reviewed  Radiological Study: Ordered and Reviewed  Medical Tests: Ordered and Reviewed  Sepsis Perfusion Assessment: "I attest a sepsis perfusion exam was performed within 6 hours of sepsis, severe sepsis, or septic shock presentation, following fluid resuscitation."    Sepsis Perfusion Assessment Complete: 2/14/2022 10:00 PM    ED Management:  History, exam, vital signs concerning for hypovolemic shock, pancytopenia/anemia verses possible sepsis.  Sepsis protocols initiated, patient started on IV fluids, IV antibiotics, broad workup including blood cultures ordered, will evaluate for possible source.    Other differentials include pneumonia, UTI, cardiogenic shock, adrenal insufficiency, less likely AAA hemorrhagic shock or tamponade  IV antibiotics were  Full 30 cc/kg bolus was ordered as patient does not have contraindications to aggressive IV fluid resuscitation  Will evaluate closely for improvement in hemodynamic status              Attending Attestation:   Physician Attestation Statement for Resident:  As the supervising MD   Physician Attestation " Statement: I have personally seen and examined this patient.   I agree with the above history. -: 70 yo W with extensive pmhx including CAD, MDS presents via code blue for presyncope and hypotension.  Patient very drowsy on initial arrival and hypotensive.  She was met promptly on arrival in the resuscitation room.  IV access was obtained and fluids were administered.  Patient improved with fluids.  Labs reveal acute on chronic anemia with the hemoglobin is 6.7.  As fluids were infusing, patient became more alert and interactive and eventually had improved color and mentation back to baseline.  She had no further hypotension while in the ED.  Initial lactic acid was critically elevated at 4.9.  Given her significant improvement with fluids, will repeat lactic acid after fluid resuscitation and PRBC transfusion.   As the supervising MD I agree with the above PE.    As the supervising MD I agree with the above treatment, course, plan, and disposition.        Attending Critical Care:   Critical Care Times:   Direct Patient Care (initial evaluation, reassessments, and time considering the case)................................................................15 minutes.   Additional History from reviewing old medical records or taking additional history from the family, EMS, PCP, etc.......................5 minutes.   Ordering, Reviewing, and Interpreting Diagnostic Studies...............................................................................................................5 minutes.   Documentation..................................................................................................................................................................................5 minutes.   Consultation with other Physicians. .................................................................................................................................................5 minutes.    ==============================================================  · Total Critical Care Time - exclusive of procedural time: 35 minutes.  ==============================================================  Critical care was necessary to treat or prevent imminent or life-threatening deterioration of the following conditions: hypotension.           ED Course as of 02/14/22 2246 Mon Feb 14, 2022   1625 POCT Glucose(!): 167 [OK]   1625 BP(!): 70/40 [OK]   1731 Lactate, Emmanuel(!!): 4.9 [OK]   1731 Troponin I: 0.014 [OK]   1731 WBC(!): 2.47 [OK]   1731 Gran # (ANC)(!): 1.2 [OK]   1834 BP(!): 108/52 [OK]   2225 Lactate, Emmanuel: 1.4  Patient's clinical status improved, blood pressure improved, she is more alert.  Overall reassuring that she responded to resuscitation. [OK]   2226 Discussed with BMT who will admit patient [OK]      ED Course User Index  [OK] Kumar Alves MD             Clinical Impression:   Final diagnoses:  [I95.9] Hypotension  [D64.9] Anemia, unspecified type  [A41.9] Sepsis, due to unspecified organism, unspecified whether acute organ dysfunction present (Primary)  [R41.89] Unresponsiveness          ED Disposition Condition    Admit               Kumar Alves MD  Resident  02/14/22 2246

## 2022-02-14 NOTE — SIGNIFICANT EVENT
RAPID RESPONSE RESPIRATORY THERAPY CODE BLUE NOTE             Code Status: Prior   : 1950  Bed: ED :   MRN: [id number]  Time page Received: 1604  Time Rapid Response RT at Bedside: 1609  Time Rapid Response RT left Bedside: 1616  Report given to: JUAN De La Rosa    SITUATION    Evaluated patient for: Code Blue    BACKGROUND    Why is the patient in the hospital?: <principal problem not specified>    Patient has a past medical history of Allergic rhinitis, Allergy, Anemia, Anxiety, CAD (coronary artery disease), Carotid stenosis, Colon polyps, Controlled type 2 diabetes mellitus without complication, without long-term current use of insulin, Depression, GERD (gastroesophageal reflux disease), Hearing loss in right ear, psychiatric care, MDS (myelodysplastic syndrome) with 5q deletion, Psychiatric problem, and Therapy.    24 Hours Vitals Range:  Pulse:  [79]   Resp:  [16]   BP: (70)/(40)   SpO2:  [95 %]     Labs:    No results for input(s): NA, K, CL, CO2, CREATININE, GLU, PHOS, MG in the last 72 hours.    Invalid input(s): CMP,  BUN, TBIL     No results for input(s): PH, PCO2, PO2, HCO3, POCSATURATED, BE in the last 72 hours.    ASSESSMENT/INTERVENTIONS    Upon arrival to room: Pt was in wheelchair enroute to ER with Rapid RNs. Pulse was present, but Pt was obtunded.     Was the patient on NIPPV prior to code?: No  Does the patient have a surgical airway: No  Was the patient intubated? No   Tube Size: N/A   Secured at: N/A  Intubation time: N/A  ETCO2 monitored: no  Ambu at bedside:      Please see Code Blue Documentation for more details.    OUTCOME    ROSC obtained at prior to our arrival on scene..     Disposition: Tx to ER bed 02.    RT CODE TEAM MEMBERS    RRSRT: Nubia Mace, JUAN, [id number]    Additional RTs: Magdi Barber

## 2022-02-15 PROBLEM — A41.9 SEVERE SEPSIS: Status: ACTIVE | Noted: 2022-01-01

## 2022-02-15 PROBLEM — R65.20 SEVERE SEPSIS: Status: ACTIVE | Noted: 2022-01-01

## 2022-02-15 NOTE — ASSESSMENT & PLAN NOTE
- follows with cardiology  - S/P left carotid endarterectomy prior to cancer dx, s/p PCI (3 stents) >20 years ago   - Continue on ASA 81mg daily, platelets are wnl  - not on statin

## 2022-02-15 NOTE — ED NOTES
Telemetry Verification   Patient placed on Telemetry Box  Verified with War Room  Box # [id number]   Monitor Tech Saima   Rate 92   Rhythm sinus

## 2022-02-15 NOTE — PT/OT/SLP PROGRESS
Occupational Therapy      Patient Name:  Susan Puente   MRN:  [id number]    Patient not seen today secondary to on bedrest until 1617 on this date.    2/15/2022

## 2022-02-15 NOTE — ASSESSMENT & PLAN NOTE
Lab Results   Component Value Date    HGBA1C 6.2 (H) 05/13/2021     Home meds: metformin     - LDSS  - POCT glucose ACHS  - Diabetic diet

## 2022-02-15 NOTE — SUBJECTIVE & OBJECTIVE
Patient information was obtained from patient, past medical records and ER records.     Oncology History (per prior notes from Dr. Valdes)  - Primary oncologist, Dr. 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 Developed allergy and was then desensitized. Soon after developed pancytopenia and Revlimid held since June 2017.    - BMBX on 6/8/17 was with normal cytogenetics. 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 Discussed treatment options of HMA therapy or repeat trial of Revlimid and patient wished to proceed with Revlimid   - Revlimid stopped again due to repeat allergic reaction and pancytopenia 3/2019  - Started cycle 1 Vidaza 5/6/19 subq for 5 days every 28 days  - Restaging bone marrow biopsy following cycle 5 from 10/24/19 shows persistent MDS, no excess blasts and 3 of 20 metaphases with 5q deletion  - Tolerated previous cycles well. Package insert for Vidaza recommends holding if ANC <1000, however pt has been receiving this with an ANC below 1000 for each cycle. Okay to continue to give despite neutropenia per Dr. Valdes  - Restaging marrow on 04/21/21 with persistent MDS with isolated del 5Q. Of 20 metaphases, 5 were normal and 15 had a 5q deletion, NGS positive for SF3B1 and TP53 (12%). 1.4% blasts  - Received vidaza for 22 cycles, received Decitabine for C23 due to national shortage of vidaza and then back to vidaza with C24.  - s/p cycle 37 on 2/7  (Not in a hospital admission)      Revlimid [lenalidomide]     Past Medical History:   Diagnosis Date    Allergic rhinitis     Allergy     Anemia     Anxiety     CAD (coronary artery disease)     Carotid stenosis     Colon polyps 2016    Controlled type 2 diabetes mellitus without complication, without long-term current use of insulin 10/19/2016    Depression     GERD (gastroesophageal reflux disease)     Hearing loss in right ear     Hx of psychiatric care     Celexa, Prozac    MDS (myelodysplastic  syndrome) with 5q deletion     Psychiatric problem     Therapy      Past Surgical History:   Procedure Laterality Date    BONE MARROW BIOPSY Left 2018    Procedure: Biopsy-bone marrow;  Surgeon: Gita Valdes MD;  [facility];  Service: Oncology;  Laterality: Left;    BONE MARROW BIOPSY Left 3/21/2019    Procedure: Biopsy-bone marrow;  Surgeon: Gita Valdes MD;  Location: [facility] OR [facility] FLR;  Service: Oncology;  Laterality: Left;    BONE MARROW BIOPSY Left 10/24/2019    Procedure: Biopsy-bone marrow;  Surgeon: Gita Valdes MD;  [facility];  Service: Oncology;  Laterality: Left;  left iliac crest    BONE MARROW BIOPSY Left 2021    Procedure: Biopsy-bone marrow;  Surgeon: Gita Valdes MD;  Location: [facility] ENDO (4TH FLR);  Service: Oncology;  Laterality: Left;    BUNIONECTOMY      CAROTID ENDARTERECTOMY Left     CAROTID STENT      COLONOSCOPY N/A 2016    Procedure: COLONOSCOPY;  Surgeon: PATRICIA Simpson MD;  Location: [facility] ENDO (4TH FLR);  Service: Endoscopy;  Laterality: N/A;  pt has vomiting with anesthesia in past    ENDOMETRIAL ABLATION      for endometriosis    INSERTION OF TUNNELED CENTRAL VENOUS CATHETER (CVC) WITH SUBCUTANEOUS PORT N/A 2020    Procedure: [id number];  Surgeon: Dosc Diagnostic Provider;  [facility];  Service: Radiology;  Laterality: N/A;  189    TONSILLECTOMY      TYMPANOSTOMY TUBE PLACEMENT       Family History     Problem Relation (Age of Onset)    Alcohol abuse Father, Brother    Cancer Paternal Grandmother    Heart disease Mother, Father    Hyperlipidemia Mother        Tobacco Use    Smoking status: Former Smoker     Packs/day: 0.50     Years: 36.00     Pack years: 18.00     Types: Cigarettes, Vaping with nicotine     Quit date: 3/3/2017     Years since quittin.9    Smokeless tobacco: Never Used   Substance and Sexual Activity    Alcohol use: No    Drug use: No    Sexual activity: Not on file        Review of Systems   Constitutional: Negative for chills and fever.   HENT: Negative for congestion and rhinorrhea.    Eyes: Negative for visual disturbance.   Respiratory: Negative for cough and shortness of breath.    Cardiovascular: Negative for chest pain and leg swelling.   Gastrointestinal: Negative for abdominal pain, diarrhea, nausea and vomiting.   Genitourinary: Negative for dysuria.   Musculoskeletal: Negative for back pain.   Skin: Negative for rash.   Neurological: Positive for syncope and light-headedness. Negative for weakness.   Psychiatric/Behavioral: Negative for agitation and confusion.     Objective:     Vital Signs (Most Recent):  Temp: 98 °F (36.7 °C) (02/14/22 2258)  Pulse: 70 (02/14/22 2331)  Resp: 19 (02/14/22 2258)  BP: (!) 145/59 (02/14/22 2331)  SpO2: 100 % (02/14/22 2331) Vital Signs (24h Range):  Temp:  [97.6 °F (36.4 °C)-98.2 °F (36.8 °C)] 98 °F (36.7 °C)  Pulse:  [70-79] 70  Resp:  [16-20] 19  SpO2:  [94 %-100 %] 100 %  BP: ()/(40-67) 145/59     Weight: 70.3 kg (155 lb)  Body mass index is 25.79 kg/m².  Body surface area is 1.8 meters squared.    ECOG SCORE         [unfilled]    Lines/Drains/Airways     Central Venous Catheter Line            Port A Cath Single Lumen right subclavian -- days    Tunneled Central Line Insertion/Assessment - Double Lumen  02/19/20 1319 right internal jugular 726 days    Port A Cath Single Lumen 02/14/22 1615 <1 day          Peripheral Intravenous Line                 Peripheral IV - Single Lumen 02/14/22 1612 18 G Right Forearm <1 day         Peripheral IV - Single Lumen 02/14/22 1614 18 G Left Wrist <1 day                Physical Exam  Constitutional:       Appearance: Normal appearance.   HENT:      Head: Normocephalic and atraumatic.      Nose: Nose normal.      Mouth/Throat:      Mouth: Mucous membranes are moist.   Eyes:      Conjunctiva/sclera: Conjunctivae normal.   Cardiovascular:      Rate and Rhythm: Normal rate and regular rhythm.       Pulses: Normal pulses.      Heart sounds: No murmur heard.      Pulmonary:      Effort: Pulmonary effort is normal. No respiratory distress.      Breath sounds: Normal breath sounds. No wheezing.   Abdominal:      General: Abdomen is flat. There is no distension.      Tenderness: There is no abdominal tenderness. There is no guarding or rebound.   Musculoskeletal:      Cervical back: Normal range of motion. No rigidity.      Right lower leg: No edema.      Left lower leg: No edema.   Skin:     Capillary Refill: Capillary refill takes less than 2 seconds.      Findings: No rash.   Neurological:      General: No focal deficit present.      Mental Status: She is alert and oriented to person, place, and time.         Significant Labs:   CBC:   Recent Labs   Lab 02/14/22  1633   WBC 2.47*   HGB 6.7*   HCT 20.5*      , CMP:   Recent Labs   Lab 02/14/22  1633      K 4.8      CO2 24   *   BUN 31*   CREATININE 1.3   CALCIUM 10.0   PROT 7.0   ALBUMIN 3.4*   BILITOT 0.5   ALKPHOS 58   AST 52*   ALT 94*   ANIONGAP 11   EGFRNONAA 41.4*   , Urine Studies:   Recent Labs   Lab 02/14/22  1756   COLORU Yellow   APPEARANCEUA Clear   PHUR 7.0   SPECGRAV 1.010   PROTEINUA Negative   GLUCUA Negative   KETONESU Negative   BILIRUBINUA Negative   OCCULTUA Negative   NITRITE Negative   LEUKOCYTESUR Negative    and All pertinent labs from the last 24 hours have been reviewed.    Diagnostic Results:  I have reviewed all pertinent imaging results/findings within the past 24 hours.

## 2022-02-15 NOTE — H&P
[facility] - Emergency Dept  Hematology  Bone Marrow Transplant  H&P    Subjective:     Principal Problem: Severe sepsis    HPI: 72yo female with MDS on vidaza s/p cycle 37 on 2/7, CAD s/p 3 stents 20 years ago, DM, GERD presents for AMS and hypotenstion. Patient states she was in her normal state of health and came for lab draw today. Per nursing notes code blue was called after patient was found unresponsive with pulses while in [facility]. Unclear how long she was out. She reports she developed flushing and lightheadedness just prior to episode but does not remember anything until arrival to ED.    In the ED, initial BP 70/40, HR 80s, and satting well on RA. Labs with WBC of 2.4, hgb 6.7 (baseline 6.5-8), plt 210, Cr 1.3 (baseline 1), mildly elevated LFTs, LA 4.9, trop and BNP WNL, BG 160s, UA non infectious. CXR with no acute findings. She was given 1L IVFs with improvement of LA to 1.4 and normalization of BP and 1 u prbc. Also given doses of vanc, zosyn. She was then admitted to BMT for further care.     On my interview, patient back to baseline and conversing normally. Reports she has had these episodes of the past year while at home. Occurring about once every 3 months. States preceding hot flashes and lightheadedness followed by syncope. No palpitations. She does not know how long she loses consciousness for. Denies recent fever, cough, chest pain, n/v/d.       Patient information was obtained from patient, past medical records and ER records.     Oncology History (per prior notes from Dr. Valdes)  - Primary oncologist, Dr. 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 Developed allergy and was then desensitized. Soon after developed pancytopenia and Revlimid held since June 2017.    - BMBX on 6/8/17 was with normal cytogenetics. 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 Discussed treatment options of HMA therapy or repeat trial of Revlimid and patient wished to proceed with  Revlimid   - Revlimid stopped again due to repeat allergic reaction and pancytopenia 3/2019  - Started cycle 1 Vidaza 5/6/19 subq for 5 days every 28 days  - Restaging bone marrow biopsy following cycle 5 from 10/24/19 shows persistent MDS, no excess blasts and 3 of 20 metaphases with 5q deletion  - Tolerated previous cycles well. Package insert for Vidaza recommends holding if ANC <1000, however pt has been receiving this with an ANC below 1000 for each cycle. Okay to continue to give despite neutropenia per Dr. Valdes  - Restaging marrow on 04/21/21 with persistent MDS with isolated del 5Q. Of 20 metaphases, 5 were normal and 15 had a 5q deletion, NGS positive for SF3B1 and TP53 (12%). 1.4% blasts  - Received vidaza for 22 cycles, received Decitabine for C23 due to national shortage of vidaza and then back to vidaza with C24.  - s/p cycle 37 on 2/7  (Not in a hospital admission)      Revlimid [lenalidomide]     Past Medical History:   Diagnosis Date    Allergic rhinitis     Allergy     Anemia     Anxiety     CAD (coronary artery disease)     Carotid stenosis     Colon polyps 2016    Controlled type 2 diabetes mellitus without complication, without long-term current use of insulin 10/19/2016    Depression     GERD (gastroesophageal reflux disease)     Hearing loss in right ear     Hx of psychiatric care     Celexa, Prozac    MDS (myelodysplastic syndrome) with 5q deletion     Psychiatric problem     Therapy      Past Surgical History:   Procedure Laterality Date    BONE MARROW BIOPSY Left 6/7/2018    Procedure: Biopsy-bone marrow;  Surgeon: Gita Valdes MD;  Location: [address];  Service: Oncology;  Laterality: Left;    BONE MARROW BIOPSY Left 3/21/2019    Procedure: Biopsy-bone marrow;  Surgeon: Gita Valdes MD;  Location: [address];  Service: Oncology;  Laterality: Left;    BONE MARROW BIOPSY Left 10/24/2019    Procedure: Biopsy-bone marrow;  Surgeon: Gita Valdes MD;  Location: [facility] OR  2ND FLR;  Service: Oncology;  Laterality: Left;  left iliac crest    BONE MARROW BIOPSY Left 2021    Procedure: Biopsy-bone marrow;  Surgeon: Gita Valdes MD;  Location: [facility] ENDO (4TH FLR);  Service: Oncology;  Laterality: Left;    BUNIONECTOMY      CAROTID ENDARTERECTOMY Left     CAROTID STENT      COLONOSCOPY N/A 2016    Procedure: COLONOSCOPY;  Surgeon: PATRICIA Simpson MD;  Location: [facility] ENDO (4TH FLR);  Service: Endoscopy;  Laterality: N/A;  pt has vomiting with anesthesia in past    ENDOMETRIAL ABLATION      for endometriosis    INSERTION OF TUNNELED CENTRAL VENOUS CATHETER (CVC) WITH SUBCUTANEOUS PORT N/A 2020    Procedure: [id number];  Surgeon: Dosc Diagnostic Provider;  Location: [facility] OR 2ND FLR;  Service: Radiology;  Laterality: N/A;  189    TONSILLECTOMY      TYMPANOSTOMY TUBE PLACEMENT       Family History     Problem Relation (Age of Onset)    Alcohol abuse Father, Brother    Cancer Paternal Grandmother    Heart disease Mother, Father    Hyperlipidemia Mother        Tobacco Use    Smoking status: Former Smoker     Packs/day: 0.50     Years: 36.00     Pack years: 18.00     Types: Cigarettes, Vaping with nicotine     Quit date: 3/3/2017     Years since quittin.9    Smokeless tobacco: Never Used   Substance and Sexual Activity    Alcohol use: No    Drug use: No    Sexual activity: Not on file       Review of Systems   Constitutional: Negative for chills and fever.   HENT: Negative for congestion and rhinorrhea.    Eyes: Negative for visual disturbance.   Respiratory: Negative for cough and shortness of breath.    Cardiovascular: Negative for chest pain and leg swelling.   Gastrointestinal: Negative for abdominal pain, diarrhea, nausea and vomiting.   Genitourinary: Negative for dysuria.   Musculoskeletal: Negative for back pain.   Skin: Negative for rash.   Neurological: Positive for syncope and light-headedness. Negative for weakness.    Psychiatric/Behavioral: Negative for agitation and confusion.     Objective:     Vital Signs (Most Recent):  Temp: 98 °F (36.7 °C) (02/14/22 2258)  Pulse: 70 (02/14/22 2331)  Resp: 19 (02/14/22 2258)  BP: (!) 145/59 (02/14/22 2331)  SpO2: 100 % (02/14/22 2331) Vital Signs (24h Range):  Temp:  [97.6 °F (36.4 °C)-98.2 °F (36.8 °C)] 98 °F (36.7 °C)  Pulse:  [70-79] 70  Resp:  [16-20] 19  SpO2:  [94 %-100 %] 100 %  BP: ()/(40-67) 145/59     Weight: 70.3 kg (155 lb)  Body mass index is 25.79 kg/m².  Body surface area is 1.8 meters squared.    ECOG SCORE         [unfilled]    Lines/Drains/Airways     Central Venous Catheter Line            Port A Cath Single Lumen right subclavian -- days    Tunneled Central Line Insertion/Assessment - Double Lumen  02/19/20 1319 right internal jugular 726 days    Port A Cath Single Lumen 02/14/22 1615 <1 day          Peripheral Intravenous Line                 Peripheral IV - Single Lumen 02/14/22 1612 18 G Right Forearm <1 day         Peripheral IV - Single Lumen 02/14/22 1614 18 G Left Wrist <1 day                Physical Exam  Constitutional:       Appearance: Normal appearance.   HENT:      Head: Normocephalic and atraumatic.      Nose: Nose normal.      Mouth/Throat:      Mouth: Mucous membranes are moist.   Eyes:      Conjunctiva/sclera: Conjunctivae normal.   Cardiovascular:      Rate and Rhythm: Normal rate and regular rhythm.      Pulses: Normal pulses.      Heart sounds: No murmur heard.      Pulmonary:      Effort: Pulmonary effort is normal. No respiratory distress.      Breath sounds: Normal breath sounds. No wheezing.   Abdominal:      General: Abdomen is flat. There is no distension.      Tenderness: There is no abdominal tenderness. There is no guarding or rebound.   Musculoskeletal:      Cervical back: Normal range of motion. No rigidity.      Right lower leg: No edema.      Left lower leg: No edema.   Skin:     Capillary Refill: Capillary refill takes less than 2  seconds.      Findings: No rash.   Neurological:      General: No focal deficit present.      Mental Status: She is alert and oriented to person, place, and time.         Significant Labs:   CBC:   Recent Labs   Lab 02/14/22  1633   WBC 2.47*   HGB 6.7*   HCT 20.5*      , CMP:   Recent Labs   Lab 02/14/22  1633      K 4.8      CO2 24   *   BUN 31*   CREATININE 1.3   CALCIUM 10.0   PROT 7.0   ALBUMIN 3.4*   BILITOT 0.5   ALKPHOS 58   AST 52*   ALT 94*   ANIONGAP 11   EGFRNONAA 41.4*   , Urine Studies:   Recent Labs   Lab 02/14/22  1756   COLORU Yellow   APPEARANCEUA Clear   PHUR 7.0   SPECGRAV 1.010   PROTEINUA Negative   GLUCUA Negative   KETONESU Negative   BILIRUBINUA Negative   OCCULTUA Negative   NITRITE Negative   LEUKOCYTESUR Negative    and All pertinent labs from the last 24 hours have been reviewed.    Diagnostic Results:  I have reviewed all pertinent imaging results/findings within the past 24 hours.    Assessment/Plan:     * Severe sepsis  70yo female with MDS on vidaza s/p cycle 37 on 2/7, CAD s/p 3 stents 20 years ago, DM, GERD admitted for AMS and syncope with hypotension and lactic acidosis. In the ED, initial BP 70/40, HR 80s, and satting well on RA. Labs with WBC of 2.4, hgb 6.7 (baseline 6.5-8), plt 210, Cr 1.3 (baseline 1), mildly elevated LFTs, LA 4.9, trop and BNP WNL, BG 160s, UA non infectious. CXR with no acute findings. She was given 1L IVFs with improvement of LA to 1.4 and normalization of BP and 1 u prbc. Also given doses of vanc, zosyn. She was then admitted to BMT for further care.     Of note, reports she has had these episodes of the past year while at home.    DDx: sepsis of unclear source, dehydration, arrhythmia, vasovagal syncope, allergic reaction   - sepsis work up including CXR, procal, UA so far unrevealing  - blood cultures NG - continue to monitor  - continue cefepime given unclear cause and neutropenia. Also will continue home ppx of acyclovir and  fluconazole. Holding cipro given cefepime   - IVFs prn   - telemetry, consider formal echo   - replete electrolytes prn     Controlled type 2 diabetes mellitus without complication, without long-term current use of insulin  Lab Results   Component Value Date    HGBA1C 6.2 (H) 05/13/2021     Home meds: metformin     - LDSS  - POCT glucose ACHS  - Diabetic diet      Adjustment disorder with mixed anxiety and depressed mood  - continue home Celexa     MDS (myelodysplastic syndrome) with 5q deletion  - please see above     Essential hypertension  - hold home antihypertensives, restart prn    Anemia requiring transfusions  Baseline 6.5-8. requires transfusions approx 2x/month. Secondary to MDS  - s/p 1u prbc in ED  - CBC BID  - transfuse for hgb<7  - no blood in stool     CAD (coronary artery disease)  - follows with cardiology  - S/P left carotid endarterectomy prior to cancer dx, s/p PCI (3 stents) >20 years ago   - Continue on ASA 81mg daily, platelets are wnl  - not on statin         VTE Risk Mitigation (From admission, onward)         Ordered     Reason for No Pharmacological VTE Prophylaxis  Once        Question:  Reasons:  Answer:  Risk of Bleeding    02/15/22 0217     IP VTE HIGH RISK PATIENT  Once         02/15/22 0217     Place sequential compression device  Until discontinued         02/15/22 0217                    Farrukh Reeseor, MD  Bone Marrow Transplant  Hematology  [facility] - Emergency Dept

## 2022-02-15 NOTE — HPI
72yo female with MDS on vidaza s/p cycle 37 on 2/7, CAD s/p 3 stents 20 years ago, DM, GERD presents for AMS and hypotenstion. Patient states she was in her normal state of health and came for lab draw today. Per nursing notes code blue was called after patient was found unresponsive with pulses while in [facility]. Unclear how long she was out. She reports she developed flushing and lightheadedness just prior to episode but does not remember anything until arrival to ED.    In the ED, initial BP 70/40, HR 80s, and satting well on RA. Labs with WBC of 2.4, hgb 6.7 (baseline 6.5-8), plt 210, Cr 1.3 (baseline 1), mildly elevated LFTs, LA 4.9, trop and BNP WNL, BG 160s, UA non infectious. CXR with no acute findings. She was given 1L IVFs with improvement of LA to 1.4 and normalization of BP and 1 u prbc. Also given doses of vanc, zosyn. She was then admitted to BMT for further care.     On my interview, patient back to baseline and conversing normally. Reports she has had these episodes of the past year while at home. Occurring about once every 3 months. States preceding hot flashes and lightheadedness followed by syncope. No palpitations. She does not know how long she loses consciousness for. Denies recent fever, cough, chest pain, n/v/d.

## 2022-02-15 NOTE — PLAN OF CARE
Problem: Adult Inpatient Plan of Care  Goal: Plan of Care Review  Outcome: Ongoing, Progressing  POC reviewed with pt. Pt O2 destat to 87%, pt now on 1L O2, team aware. Voiding adequate amounts of clear yellow urine. LBM 2/15; loose. 1 unit RBC this shift

## 2022-02-15 NOTE — ASSESSMENT & PLAN NOTE
Baseline 6.5-8. requires transfusions approx 2x/month. Secondary to MDS  - s/p 1u prbc in ED  - CBC BID  - transfuse for hgb<7  - no blood in stool

## 2022-02-15 NOTE — ASSESSMENT & PLAN NOTE
72yo female with MDS on vidaza s/p cycle 37 on 2/7, CAD s/p 3 stents 20 years ago, DM, GERD admitted for AMS and syncope with hypotension and lactic acidosis. In the ED, initial BP 70/40, HR 80s, and satting well on RA. Labs with WBC of 2.4, hgb 6.7 (baseline 6.5-8), plt 210, Cr 1.3 (baseline 1), mildly elevated LFTs, LA 4.9, trop and BNP WNL, BG 160s, UA non infectious. CXR with no acute findings. She was given 1L IVFs with improvement of LA to 1.4 and normalization of BP and 1 u prbc. Also given doses of vanc, zosyn. She was then admitted to BMT for further care.     Of note, reports she has had these episodes of the past year while at home.    DDx: sepsis of unclear source, dehydration, arrhythmia, vasovagal syncope, allergic reaction   - sepsis work up including CXR, procal, UA so far unrevealing  - blood cultures NG - continue to monitor  - continue cefepime given unclear cause and neutropenia. Also will continue home ppx of acyclovir and fluconazole. Holding cipro given cefepime   - IVFs prn   - telemetry, consider formal echo   - replete electrolytes prn

## 2022-02-15 NOTE — HPI
72yo female with MDS, coronary artery disease, DM, GERD presents after code blue called for patient being found down, unresponsive at bedside [facility].  Patient was at the cancer center for infusion for her MDS.  Patient arrives answering questions, however she was reportedly unresponsive when code blue team arrived.  Patient was brought straight to ED, was found to be hypotensive on arrival.  Patient complains of fatigue but denies any chest pain, shortness of breath.  Patient does report recent diarrhea without any black or bloody stools.  Patient was feeling nauseated prior to arrival but feels better now.

## 2022-02-16 NOTE — HOSPITAL COURSE
Patient admitted from the ED on February 15th from clinic infusion after patient passed out and unresponsive in clinic. Code blue was called, patient had pulses and returned to baseline shortly after the event. She was briefly altered surrounding event and hypotensive. She reported flushing and lightheadedness prior to the episode. Pt reports history of similar episodes and associates these with poor PO intake and dehydration. In the ED she was noted to be hypotensive, anemic, with slight NOEL. UA and CXR were negative. She received 1L IVF with improvement followed by unit of PRBC. Blood cultures were drawn and pt received broad spec abx, blood cultures NGTD at time of discharge, AF, and VSS. She is being discharged to home in improved condition.

## 2022-02-16 NOTE — PLAN OF CARE
[facility] - Oncology ([facility]      HOME HEALTH ORDERS  FACE TO FACE ENCOUNTER    Patient Name: Susan Puente  YOB: 1950    Oncologist: Dr. Gita Valdes  Address: [address] [place] [address]  Phone Number: [phone]  Fax Number: [phone]    Encounter Date: 2/14/22    Admit to Home Health    Diagnoses:  Active Hospital Problems    Diagnosis  POA    *Severe sepsis [A41.9, R65.20]  Unknown    Controlled type 2 diabetes mellitus without complication, without long-term current use of insulin [E11.9]  Yes    Adjustment disorder with mixed anxiety and depressed mood [F43.23]  Yes    MDS (myelodysplastic syndrome) with 5q deletion [D46.C]  Yes    Essential hypertension [I10]  Yes    Anemia requiring transfusions [D64.9]  Yes    CAD (coronary artery disease) [I25.10]  Yes      Resolved Hospital Problems   No resolved problems to display.       Follow Up Appointments:  Future Appointments   Date Time Provider Department Center   2/17/2022  9:00 AM [facility]-CT1 500 LB LIMIT [facility]   2/17/2022  9:40 AM [facility] LAB BMT [facility]   2/18/2022  1:00 PM CHEMO 1 [facility] CHEMO [facility]   2/21/2022  9:40 AM [facility] LAB BMT [facility]   2/22/2022  1:00 PM NURSE 10, [facility] CHEMO [facility] CHE[facility]   2/23/2022  2:00 PM Sonia Robledo, PT [facility] OP RHB [facility]   2/24/2022  9:40 AM [facility] LAB BMT [facility]   2/25/2022  1:00 [facility]   2/28/2022  9:40 AM [facility] LAB BMT [facility]   2/28/2022  1:00 PM Sonia Robledo, PT [facility] OP RHB [facility]   3/2/2022  1:00 PM NURSE 5, [facility] CHEMO [facility] CHEMO [facility]   3/3/2022  9:40 AM [facility] LAB BMT [facility] LAB[facility]   3/3/2022  1:00 PM Lyly Norman, PT [facility] OP RHB [facility]   3/4/2022  1:00 PM NURSE 10, [facility] CHEMO [facility] CHEMO [facility]   3/7/2022 10:00 AM [facility] LAB BMT [facility] LABT [facility]   3/7/2022 11:00 AM  Jolanta Vasquez PA-C [facility] BMT [facility]   3/7/2022 11:30 AM INJECTION, [facility] INFUSION [facility] CHE[facility]   3/8/2022  1:00 PM CHEMO 11 [facility]   3/9/2022 11:00 AM INJECTION, [facility] INFUSION [facility]   3/10/2022  9:00 AM [facility] BMT [facility]   3/10/2022  9:45 AM INJECTION, [facility] INFUSION [facility]   3/11/2022  1:00 PM CHEMO 11 [facility]   3/14/2022  9:00 AM [facility] LAB BMT [facility]   3/14/2022  1:00 PM Lyly Norman, PT [facility] OP [facility]   3/15/2022  1:00 PM CHAIR 5, [facility] BMT INF [facility] BMT INF [facility] BMT   3/16/2022  1:00 PM Sonia Robledo, PT [facility] OP [facility]   3/17/2022  9:00 AM [facility] LAB BMT [facility]   3/18/2022  1:00 PM CHEMO 11 [facility] CHEMO [facility]   3/21/2022  9:00 AM [facility] LAB BMT [facility]   3/21/2022  1:00 PM Sonia Robledo, PT [facility] OP [facility]   3/22/2022  1:00 PM NURSE 10, [facility] CHEMO [facility] CHE[facility]   3/23/2022  1:00 PM Sonia Robledo, PT [facility]   3/24/2022  9:00 AM [facility] LAB BMT [facility]   3/25/2022  1:00 PM CHEMO 11 [facility] CHEMO [facility]   3/28/2022  9:00 AM [facility] LAB BMT [facility]   3/28/2022  1:00 PM Lyly Norman, PT [facility] OP [facility]   3/29/2022  1:00 PM CHEMO 11 [facility] CHEMO [facility]   3/30/2022  1:00 PM Sonia Robledo, PT [facility] OP RHB [facility]   3/31/2022  9:00 AM [facility] LAB BMT [facility] LAB[facility]MT [facility]   4/1/2022  1:00 PM CHEMO 11 [facility] CHEMO [facility]   4/5/2022  1:00 PM CHEMO 11 [facility] CHEMO [facility]       Allergies:  Review of patient's allergies indicates:   Allergen Reactions    Revlimid [lenalidomide] Swelling     Facial swelling  6/8/17 - in the process of desensitation       Medications: Review discharge medications with patient and family and provide education.    Current Facility-Administered Medications    Medication Dose Route Frequency Provider Last Rate Last Admin    0.9%  NaCl infusion (for blood administration)   Intravenous Q24H PRN Narendra Bunn MD        acetaminophen tablet 650 mg  650 mg Oral Q8H PRN Farrukh Elizabeth MD   650 mg at 02/15/22 0439    acyclovir capsule 400 mg  400 mg Oral BID Farrukh Elizabeth MD   400 mg at 02/16/22 0817    aspirin EC tablet 81 mg  81 mg Oral Daily Farrukh Elizabeth MD   81 mg at 02/16/22 0818    cetirizine tablet 10 mg  10 mg Oral Daily PRN Narendra Bunn MD   10 mg at 02/15/22 1106    citalopram tablet 20 mg  20 mg Oral Daily Farrukh Elizabeth MD   20 mg at 02/16/22 0817    dextrose 10% bolus 125 mL  12.5 g Intravenous PRN Shahram Lujan MD        dextrose 10% bolus 250 mL  25 g Intravenous PRN Shahram Lujan MD        fluconazole tablet 400 mg  400 mg Oral Daily Farrukh Elizabeth MD   400 mg at 02/16/22 0817    gabapentin capsule 100 mg  100 mg Oral BID Farrukh Elizabeth MD   100 mg at 02/16/22 0817    glucagon (human recombinant) injection 1 mg  1 mg Intramuscular PRN Farrukh Elizabeth MD        glucose chewable tablet 16 g  16 g Oral PRN Farrukh Elizabeth MD        glucose chewable tablet 24 g  24 g Oral PRN Farrukh Elizabeth MD        hydrALAZINE tablet 25 mg  25 mg Oral Q8H PRN Emili Orr DO   25 mg at 02/15/22 1827    insulin aspart U-100 pen 0-5 Units  0-5 Units Subcutaneous QID (AC + HS) PRN Farrukh Elizabeth MD        lisinopriL tablet 20 mg  20 mg Oral Daily Jolanta Vasquez PA-C   20 mg at 02/16/22 0928    melatonin tablet 9 mg  9 mg Oral Nightly PRN Farrukh Elizabeth MD   9 mg at 02/15/22 2131    naloxone 0.4 mg/mL injection 0.02 mg  0.02 mg Intravenous PRN Farrukh Elizabeth MD        nicotine 7 mg/24 hr 1 patch  1 patch Transdermal Daily Jolanta Vasquez PA-C   1 patch at 02/16/22 0928    ondansetron disintegrating tablet 4 mg  4 mg Oral Q6H PRN Farrukh Elizabeth MD        sodium chloride 0.9% flush 10 mL  10 mL Intravenous Q12H PRN Farrukh , MD          Facility-Administered Medications Ordered in Other Encounters   Medication Dose Route Frequency Provider Last Rate Last Admin    diphenhydrAMINE capsule 25 mg  25 mg Oral PRN Gita Valdes MD         Current Discharge Medication List      CONTINUE these medications which have NOT CHANGED    Details   acetaminophen (TYLENOL) 650 MG TbSR Take 650 mg by mouth every 8 (eight) hours as needed (for pain).      acyclovir (ZOVIRAX) 400 MG tablet Take 1 tablet (400 mg total) by mouth 2 (two) times daily.  Qty: 60 tablet, Refills: 6    Associated Diagnoses: Pancytopenia      !! azacitidine (VIDAZA INJ) Inject as directed every 28 days.      !! azaCITIDine (VIDAZA) 100 mg SolR chemo injection       blood sugar diagnostic Strp To check blood sugar 1 times daily  Qty: 100 each, Refills: 11    Associated Diagnoses: Controlled type 2 diabetes mellitus with stage 3 chronic kidney disease, without long-term current use of insulin      carvediloL (COREG) 12.5 MG tablet Take 1 tablet (12.5 mg total) by mouth 2 (two) times daily with meals.  Qty: 60 tablet, Refills: 11    Comments: .      ciprofloxacin HCl (CIPRO) 500 MG tablet Take 1 tablet (500 mg total) by mouth 2 (two) times daily.  Qty: 60 tablet, Refills: 5    Associated Diagnoses: Myelodysplastic syndrome; Prophylactic antibiotic      citalopram (CELEXA) 20 MG tablet Take 1 tablet (20 mg total) by mouth once daily.  Qty: 30 tablet, Refills: 2    Associated Diagnoses: Depression, unspecified depression type      fluconazole (DIFLUCAN) 200 MG Tab Take 2 tablets (400 mg total) by mouth once daily.  Qty: 60 tablet, Refills: 6    Associated Diagnoses: Pancytopenia      gabapentin (NEURONTIN) 100 MG capsule Take 1 capsule (100 mg total) by mouth 2 (two) times daily.  Qty: 60 capsule, Refills: 5    Associated Diagnoses: Bilateral sciatica      lancets 30 gauge Misc Use to test blood sugar daily  Qty: 100 each, Refills: 3    Associated Diagnoses: Controlled type 2 diabetes mellitus  with stage 3 chronic kidney disease, without long-term current use of insulin      lisinopriL-hydrochlorothiazide (PRINZIDE,ZESTORETIC) 20-12.5 mg per tablet Take 2 tablets by mouth once daily.  Qty: 180 tablet, Refills: 3    Comments: .  Associated Diagnoses: Coronary artery disease, angina presence unspecified, unspecified vessel or lesion type, unspecified whether native or transplanted heart      loratadine (CLARITIN) 10 mg tablet Take 10 mg by mouth daily as needed.       magic mouthwash diphen/antac/lidoc Swish and spit 15 mls every 4 (four)  hours as needed.  Qty: 240 mL, Refills: 3    Associated Diagnoses: MDS (myelodysplastic syndrome) with 5q deletion      magnesium 30 mg Tab Take by mouth once.      melatonin 10 mg Tab Take 1 tablet (10 mg total) by mouth once daily.    Associated Diagnoses: Insomnia, unspecified type      metFORMIN (GLUCOPHAGE-XR) 500 MG ER 24hr tablet Take 1 tablet (500 mg total) by mouth daily with breakfast.  Qty: 90 tablet, Refills: 0    Associated Diagnoses: Controlled type 2 diabetes mellitus without complication, without long-term current use of insulin      MULTIVITAMIN ORAL Take by mouth.      ondansetron (ZOFRAN-ODT) 8 MG TbDL Dissolve 1 tablet by mouth every 6 hours.  Qty: 30 tablet, Refills: 6    Associated Diagnoses: MDS (myelodysplastic syndrome) with 5q deletion      pantoprazole (PROTONIX) 40 MG tablet Take 1 tablet (40 mg total) by mouth once daily.  Qty: 30 tablet, Refills: 3    Associated Diagnoses: Gastroesophageal reflux disease without esophagitis      potassium chloride SA (K-DUR,KLOR-CON) 20 MEQ tablet Take 1 tablet (20 mEq total) by mouth once daily.  Qty: 30 tablet, Refills: 4    Associated Diagnoses: Hypokalemia      turmeric 400 mg Cap Take 1 capsule by mouth once daily.    Associated Diagnoses: Myelodysplastic syndrome      aspirin (ECOTRIN) 81 MG EC tablet Take 1 tablet (81 mg total) by mouth once daily.  Qty: 30 tablet, Refills: 11      evolocumab (REPATHA  SURECLICK) 140 mg/mL PnIj Inject 140mg (1 pen) into the skin every 2 weeks.  Qty: 6 mL, Refills: 3       !! - Potential duplicate medications found. Please discuss with provider.      STOP taking these medications       fexofenadine (ALLEGRA) 180 MG tablet Comments:   Reason for Stopping:                 I have seen and examined this patient within the last 30 days. My clinical findings that support the need for the home health skilled services and home bound status are the following:no   Weakness/numbness causing balance and gait disturbance due to Anemia and Malignancy/Cancer making it taxing to leave home.     Referrals/ Consults  Physical Therapy to evaluate and treat. Evaluate for home safety and equipment needs; Establish/upgrade home exercise program. Perform / instruct on therapeutic exercises, gait training, transfer training, and Range of Motion.    Activities:   activity as tolerated    Nursing:   Agency to admit patient within 48 hours of hospital discharge unless specified on physician order or at patient request    SN to complete comprehensive assessment including routine vital signs. Instruct on disease process and s/s of complications to report to MD. Review/verify medication list sent home with the patient at time of discharge  and instruct patient/caregiver as needed. Frequency may be adjusted depending on start of care date.     Skilled nurse to perform up to 3 visits PRN for symptoms related to diagnosis    Notify MD if SBP > 160 or < 90; DBP > 90 or < 50; HR > 120 or < 50; Temp > 101; O2 < 88%.    Ok to schedule additional visits based on staff availability and patient request on consecutive days within the home health episode.    When multiple disciplines ordered:    Start of Care occurs on Sunday - Wednesday schedule remaining discipline evaluations as ordered on separate consecutive days following the start of care.    Thursday SOC -schedule subsequent evaluations Friday and Monday the  following week.     Friday - Saturday SOC - schedule subsequent discipline evaluations on consecutive days starting Monday of the following week.    For all post-discharge communication and subsequent orders please contact patient's Oncologist.     Home Health Aide:  Physical Therapy Twice weekly    I certify that this patient is confined to her home and needs physical therapy.    Jolanta Vasquez PA-C  Malignant Hematology & Bone Marrow Transplant

## 2022-02-16 NOTE — PLAN OF CARE
Plan of care reviewed with patient. Afebrile. Free from falls or injury. No complaints of pain. Cefepime given as scheduled. Pt on 2 L of O2. Bed locked in lowest position, non skid socks on, call light within reach. Pt instructed to call if any assistance is needed. Vitals stable. Will cont to massimo pt.

## 2022-02-16 NOTE — PT/OT/SLP EVAL
"Occupational Therapy  Co- Evaluation and Discharge Note    *Co-evaluation to maximize functional mobility and safety    Name: Susan Puente  MRN: [id number]  Admitting Diagnosis:  Severe sepsis   Recent Surgery: * No surgery found *      Recommendations:     Discharge Recommendations: home health PT  Discharge Equipment Recommendations:  none  Barriers to discharge:  None    Assessment:     Susan Puente is a 71 y.o. female with a medical diagnosis of Severe sepsis. At this time, patient is functioning at their prior level of function and does not require further acute OT services.     Plan:     During this hospitalization, patient does not require further acute OT services.  Please re-consult if situation changes.    · Plan of Care Reviewed with: patient    Subjective     Chief Complaint: No complaints this date.  "I would like to take a shower before I leave"  Patient/Family Comments/goals: return home to [facility]    Occupational Profile:  Living Environment: Pt lives alone in [address] [place] [address] with 3 long steps to front door with no HR, interior 16 steps to 2nd floor with RHR. While bedrooms and master bath are on 2nd floor, patient could sleep on first floor due to half bath and recliner chair, if needed. Neighbors are helpful to patient     Previous level of function: Independent in ADLs, IADLs  Equipment Used at home:  rollator,shower chair  Assistance upon Discharge: Neighbors    Pain/Comfort:  · Pain Rating 1: 0/10  · Pain Rating 2: 0/10    Patients cultural, spiritual, Confucianist conflicts given the current situation: no    Objective:     Communicated with: ALIE White prior to session.  Patient found HOB elevated with telemetry upon OT entry to room.    General Precautions: Standard, fall   Orthopedic Precautions:N/A   Braces: N/A  Respiratory Status: Nasal cannula, flow 2 L/min though removed NC and completed activities on room air with regular checks using oximeter.     Occupational Performance:    Bed " Mobility:    · Patient completed Rolling/Turning to Left with  supervision  · Patient completed Rolling/Turning to Right with supervision  · Patient completed Scooting/Bridging with supervision  · Patient completed Supine to Sit with supervision    Functional Mobility/Transfers:  · Patient completed Sit <> Stand Transfer with supervision  with  rolling walker from EOB  · Patient completed static and dynamic EOB sitting~10min with supervision, demonstrated good trunk control and balance  · Patient completed standing at sink~5min to complete ADLs with supervision/RW, demonstrating good static and dynamic standing balance  · Patient completed Toilet Step Transfer technique with supervision with  rolling walker  · Functional Mobility: Pt engaging in functional mobility to simulate household/community distances utilizing RW with SBA in order to maximize functional activity tolerance and standing balance required for engagement in occupations of choice.  Min vc required for walker management  · Patient ascended/descended 10 stairs with no AD and SBA with RHR and verbal cues to improve safety awareness and increase focus  · O2 saturation monitored throughout evaluation. Supine with oxygen 2L/min - 97%, sitting EOB w/o O2 (removed for remainder of session with nurse approval) - 91%, immediately after stair activity - 85% with reminders for PLB and a rest break, 30 seconds after stairs - 90%, 1 minute after stair activity - 93%.     Activities of Daily Living:  · Grooming: supervision completing oral care while standing at sink with RW  · Upper Body Dressing: minimum assistance donning [facility] gown and navigating lines  · Toileting: supervision completing toileting and samanta-care with supervision     Cognitive/Visual Perceptual:  Cognitive/Psychosocial Skills:     -       Oriented to: AOx4   -       Follows Commands/attention:Follows multistep  commands  -       Communication: clear/fluent  -       Memory: No Deficits  noted  -       Safety awareness/insight to disability: impaired   -       Mood/Affect/Coping skills/emotional control: Appropriate to situation and Pleasant  Visual/Perceptual:      -Intact      Physical Exam:  Balance:    -       Intact  Postural examination/scapula alignment:    -       Rounded shoulders  Dominant hand:    -       Right  Upper Extremity Range of Motion:   WFL  Upper Extremity Strength:  WFL   Strength:  WFL  Fine Motor Coordination:    -       Intact  Gross motor coordination:   WFL    AMPAC 6 Click ADL:  AMPAC Total Score: 24    Treatment & Education:   Patient tolerated therapy well, completing ADLs at bedside and in bathroom with supervision with cues for RW management, functional mobility in hallway and on stairs   Time provided for therapeutic counseling and discussion of health disposition.    Educated on importance of EOB/OOB mobility, maintaining routine, sitting up in chair, and maximizing independence with ADLs during admission    Pt completed ADLs and functional mobility for treatment session as noted above    Pt/caregiver verbalized understanding and expressed no further concerns/questions.       Education:    Patient left toilet with all lines intact, call button in reach and RN notified to prepare pt for shower, as pt/RN requested    GOALS:   Multidisciplinary Problems     Occupational Therapy Goals     Not on file          Multidisciplinary Problems (Resolved)        Problem: Occupational Therapy Goal    Goal Priority Disciplines Outcome Interventions   Occupational Therapy Goal   (Resolved)     OT, PT/OT Met    Description: OT evaluation indicates that patient is independent in ADLs and functions at previous level of mobility. Patient will be discharged from patient services                      History:     Past Medical History:   Diagnosis Date    Allergic rhinitis     Allergy     Anemia     Anxiety     CAD (coronary artery disease)     Carotid stenosis     Colon  polyps 2016    Controlled type 2 diabetes mellitus without complication, without long-term current use of insulin 10/19/2016    Depression     GERD (gastroesophageal reflux disease)     Hearing loss in right ear     Hx of psychiatric care     Celexa, Prozac    MDS (myelodysplastic syndrome) with 5q deletion     Psychiatric problem     Therapy        Past Surgical History:   Procedure Laterality Date    BONE MARROW BIOPSY Left 6/7/2018    Procedure: Biopsy-bone marrow;  Surgeon: Gita Valdes MD;  [facility];  Service: Oncology;  Laterality: Left;    BONE MARROW BIOPSY Left 3/21/2019    Procedure: Biopsy-bone marrow;  Surgeon: Gita Valdes MD;  Location: [facility] OR [facility] FLR;  Service: Oncology;  Laterality: Left;    BONE MARROW BIOPSY Left 10/24/2019    Procedure: Biopsy-bone marrow;  Surgeon: Gita Valdes MD;  [facility];  Service: Oncology;  Laterality: Left;  left iliac crest    BONE MARROW BIOPSY Left 4/21/2021    Procedure: Biopsy-bone marrow;  Surgeon: Gita Valdes MD;  Location: [facility] ENDO (4TH FLR);  Service: Oncology;  Laterality: Left;    BUNIONECTOMY      CAROTID ENDARTERECTOMY Left 2011    CAROTID STENT      COLONOSCOPY N/A 12/20/2016    Procedure: COLONOSCOPY;  Surgeon: PATRICIA Simpson MD;  Location: [facility] ENDO (4TH FLR);  Service: Endoscopy;  Laterality: N/A;  pt has vomiting with anesthesia in past    ENDOMETRIAL ABLATION      for endometriosis    INSERTION OF TUNNELED CENTRAL VENOUS CATHETER (CVC) WITH SUBCUTANEOUS PORT N/A 2/19/2020    Procedure: [id number];  Surgeon: Dosc Diagnostic Provider;  [facility];  Service: Radiology;  Laterality: N/A;  189    TONSILLECTOMY      TYMPANOSTOMY TUBE PLACEMENT         Time Tracking:     OT Date of Treatment: 02/16/22  OT Start Time: 0954  OT Stop Time: 1035  OT Total Time (min): 41 min    Billable Minutes:Evaluation 10  Self Care/Home Management 14  Therapeutic Activity 17    2/16/2022

## 2022-02-16 NOTE — PT/OT/SLP EVAL
"Physical Therapy Evaluation and Discharge Note    Patient Name:  Susan Puente   MRN:  [id number]    Recommendations:     Discharge Recommendations:  home health PT   Discharge Equipment Recommendations: none   Barriers to discharge: None    Assessment:     Susan Puente is a 71 y.o. female admitted with a medical diagnosis of Severe sepsis.  She demonstrated ability to complete bed mobility and transfers with supervision. She ambulated 150 ft with RW and SBA in hallway. She negotiated 10 stairs with R HR and SBA with no AD. Patient's oxygen saturation monitored throughout session, dropped to 85% following stair negotiation but quickly returned to 90% within 30 seconds. At this time, patient is functioning at their prior level of function and does not require further acute PT services.     Recent Surgery: * No surgery found *      Plan:     During this hospitalization, patient does not require further acute PT services.  Please re-consult if situation changes.      Subjective     Chief Complaint: "I would like to take a shower before I leave."  Patient/Family Comments/goals: return home to OF  Pain/Comfort:  Pain Rating 1: 0/10  Pain Rating 2: 0/10    Patients cultural, spiritual, Cheondoism conflicts given the current situation: no    Living Environment:  Patient lives alone in a 2 story [facility] with 3 LIGIA (no HR) and 16 stairs to the second floor (R HR). She reports that all full bed + bathrooms are upstairs, but she is able to sleep and use powder room on the first floor. She has neighbors that live close by, and able to provide assistance as needed.  Prior to admission, patients level of function was modified independent, with use of Rollator. She reports needing SBA during showers to ensure that she does not fall.  Equipment used at home: rollator,shower chair.  DME owned (not currently used): none.  Upon discharge, patient will have assistance from neighbors.    Objective:   Therapy evaluation " completed with Occupational Therapist to best establish plan of care for acute setting and to provide skilled treatment.  Communicated with nurse prior to session.  Patient found HOB elevated with telemetry,pulse ox (continuous) upon PT entry to room.    General Precautions: Standard, fall   Orthopedic Precautions:N/A   Braces: N/A   Respiratory Status: Nasal cannula, flow 2 L/min initially. Removed during session with permission from nurse.     Exams:  · Cognitive Exam:  Patient is oriented to Person, Place, Time and Situation  · Gross Motor Coordination:  WFL  · RLE ROM: WFL  · RLE Strength: WFL  · LLE ROM: WFL  · LLE Strength: WFL    Functional Mobility:  · Bed Mobility:     · Scooting: supervision  · Supine to Sit: supervision  · Transfers:     · Sit to Stand:  supervision with rolling walker  · Gait: ambulated 150 ft in hallway with SBA and RW. Min vc required for RW management. Demonstrated normal gait mechanics, no LOB noted.   · Balance: sitting: good, standing: good, required BUE support on RW and SBA to remain upright position.   · Stairs:  Pt ascended/descended 10 stair(s) with No Assistive Device with right handrail with Stand-by Assistance and frequent vc to improve patient's focus on activity and improve safety awareness. Demonstrated step-to gait pattern throughout trial, educated on energy conservation techniques for stair negotiation.  · Oxygen saturation monitored throughout evaluation: Supine with oxygen 2L/min - 97%, sitting EOB w/o O2 (removed for remainder of session following nurse approval) - 91%, immediately after stair negotiation - 85%, 30 seconds after stair negotiation - 90%, 1 min after stair negotiation - 93%    AM-PAC 6 CLICK MOBILITY  Total Score:22       Therapeutic Activities and Exercises:   Patient educated on PT POC, call for assistance, safety awareness, AD usage, gait/trf techniques, stair negotiation    AM-PAC 6 CLICK MOBILITY  Total Score:22     Patient left standing in  bathroom with OT performing self-care activities with all lines intact, call button in reach, nurse notified and OT present.    GOALS:   Multidisciplinary Problems     Physical Therapy Goals     Not on file                History:     Past Medical History:   Diagnosis Date    Allergic rhinitis     Allergy     Anemia     Anxiety     CAD (coronary artery disease)     Carotid stenosis     Colon polyps 2016    Controlled type 2 diabetes mellitus without complication, without long-term current use of insulin 10/19/2016    Depression     GERD (gastroesophageal reflux disease)     Hearing loss in right ear     Hx of psychiatric care     Celexa, Prozac    MDS (myelodysplastic syndrome) with 5q deletion     Psychiatric problem     Therapy        Past Surgical History:   Procedure Laterality Date    BONE MARROW BIOPSY Left 6/7/2018    Procedure: Biopsy-bone marrow;  Surgeon: Gita Valdes MD;  Location: [facility] OR 2ND FLR;  Service: Oncology;  Laterality: Left;    BONE MARROW BIOPSY Left 3/21/2019    Procedure: Biopsy-bone marrow;  Surgeon: Gita Valdes MD;  Location: [facility] OR 2ND FLR;  Service: Oncology;  Laterality: Left;    BONE MARROW BIOPSY Left 10/24/2019    Procedure: Biopsy-bone marrow;  Surgeon: Gita Valdes MD;  Location: [facility] OR 2ND FLR;  Service: Oncology;  Laterality: Left;  left iliac crest    BONE MARROW BIOPSY Left 4/21/2021    Procedure: Biopsy-bone marrow;  Surgeon: Gita Valdes MD;  Location: [facility] ENDO (4TH FLR);  Service: Oncology;  Laterality: Left;    BUNIONECTOMY      CAROTID ENDARTERECTOMY Left 2011    CAROTID STENT      COLONOSCOPY N/A 12/20/2016    Procedure: COLONOSCOPY;  Surgeon: PATRICIA Simpson MD;  Location: [facility] ENDO (4TH FLR);  Service: Endoscopy;  Laterality: N/A;  pt has vomiting with anesthesia in past    ENDOMETRIAL ABLATION      for endometriosis    INSERTION OF TUNNELED CENTRAL VENOUS CATHETER (CVC) WITH SUBCUTANEOUS PORT N/A 2/19/2020    Procedure: [id number]-[id number];   Surgeon: Deepa Diagnostic Provider;  Location: [facility] OR [address] [place] [address];  Service: Radiology;  Laterality: N/A;  189    TONSILLECTOMY      TYMPANOSTOMY TUBE PLACEMENT         Time Tracking:     PT Received On: 02/16/22  PT Start Time: 0955     PT Stop Time: 1028  PT Total Time (min): 33 min     Billable Minutes: Evaluation 10 and Gait Training 17      02/16/2022

## 2022-02-16 NOTE — PROGRESS NOTES
Admit Assessment    Patient Identification  Susan Puente   :  1950  Admit Date:  2022  Attending Provider:  Gita Valdes MD              Referral:   Pt was admitted to  with a diagnosis of Severe sepsis, and was admitted this hospital stay due to Unresponsiveness [R41.89]  Chest pain [R07.9]  Hypotension [I95.9]  Anemia, unspecified type [D64.9]  Sepsis, due to unspecified organism, unspecified whether acute organ dysfunction present [A41.9].   is involved was referred to the Social Work Department via routine referral.  Patient presents as a 71 y.o. year old single female. Pt never  and does not have children. Pt lives alone.     Assessment was completed at bedside with pt only.    Living Situation:      Resides at [address] [place] [address], phone: [phone] (home).      (RETIRED) Functional Status Prior  Ambulation Prior: 0-->independent  Transferrin-->independent  Toiletin-->independent    Current or Past Agencies and Description of Services/Supplies    [facility]  [facility]  Agency Phone Number: [phone]  Equipment Currently Used at Home: rollator,shower chair    Home Health  Agency Name: [facility]  Agency Phone Number: [phone]  Services: SN and PT    IV Infusion  Pt has no prior use of home IV services. Pt has no preference regarding agency if home infusion is ordered.     Nutrition:   Regular diet- PO    Outpatient Pharmacy:     [facility]  [address] [facility] [address]  Phone: [phone] Fax: [phone]    [facility]  [address] [facility] [address]  Phone: [phone] Fax: [phone]      Patient Preference of agencies include:  If DME is ordered pt would like to use [facility].     Home health was ordered by DEION Kingston and pt is requesting [facility]. Oncology LCSW faxed demographic, insurance, HH order  and clinical information to [facility]. Haley with [facility] reports that she has all required information and pt will be admitted tomorrow on Thursday, 2022.      Pt does not have a preference regarding IV infusion services.     Patient informed of right to choose providers or agencies.  Patient provides permission to release any necessary information to [facility] and to Non[facility] agencies as needed to facilitate patient care, treatment planning, and patient discharge planning.  Written and verbal resources provided.      Coping  Pt reports that she is coping well with the support of friends.     Adjustment to Diagnosis and Treatment  Adequate    Emotional/Behavioral/Cognitive Issues  None identified at this time    History/Current Symptoms of Anxiety/Depression: No  History/Current Substance Use: No  Social History     Tobacco Use    Smoking status: Former Smoker     Packs/day: 0.50     Years: 36.00     Pack years: 18.00     Types: Cigarettes, Vaping with nicotine     Quit date: 3/3/2017     Years since quittin.9    Smokeless tobacco: Never Used   Substance and Sexual Activity    Alcohol use: No    Drug use: No    Sexual activity: Not on file       Indications of Abuse/Neglect: No  Abuse Screen (yes response referral indicated)  Feels Unsafe at Home or Work/School: no  Physical Signs of Abuse Present: no    Financial:  Payer/Plan Subscr  Sex Relation Sub. Ins. ID Effective Group Num   1. [employer] MANAGE* LISANDRA PICKETT* 1950 Female Self [id number] 12/1/19 [id number]                                   [address] [address]   2. [employer] - SMO* LISANDRA PICKETT M* 1950 Female Self [id number]                                    PO       Other identified concerns/needs:  No needs or concerns identified    Plan:    Interventions/Referrals:   DEION Kingston ordered home health services. Pt provided consent for Oncology LCSW to provide demographic, insurance, home health  order and clinical information to [facility]; LCSW faxed information. Haley with [facility] reports that pt will be admitted tomorrow, 2/17/2022.   Pt is medically cleared and discharge orders are complete.  Pt will return home via private vehicle in the care of her friend. Oncology LCSW provided contact information and instructed to call if any needs or concerns arise.  Will continue to follow and assist as needed.    Patient engaged in treatment planning process.     providing psychosocial and supportive counseling, resources, education, assistance and discharge planning as appropriate.  Patient state understanding of  available resources,  following, remains available.

## 2022-02-16 NOTE — PROGRESS NOTES
Pt discharged to home per MD order. Discharge instructions explained to pt. Pt verbalized understanding of d/c instructions. PIV and Right Chest port taken out; site clean, dry, and intact. Pt ambulating in room with steady gait; tolerating regular diet; voiding without difficulty; pain well-controlled with PO pain meds.  All VSS; O2 sats WNL. Pt left floor via wheelchair to home transportation; accompanied by family member from the floor.

## 2022-02-16 NOTE — PROGRESS NOTES
Pharmacist Renal Dose Adjustment Note    Susan Puente is a 71 y.o. female being treated with the medication cefepime    Patient Data:    Vital Signs (Most Recent):  Temp: 98.2 °F (36.8 °C) (02/16/22 0329)  Pulse: 82 (02/16/22 0702)  Resp: 18 (02/16/22 0329)  BP: (!) 171/72 (02/16/22 0329)  SpO2: 97 % (02/16/22 0329)   Vital Signs (72h Range):  Temp:  [97.4 °F (36.3 °C)-98.2 °F (36.8 °C)]   Pulse:  [70-89]   Resp:  [16-20]   BP: ()/(40-74)   SpO2:  [94 %-100 %]      Recent Labs   Lab 02/14/22  1633 02/15/22  0357 02/16/22  0410   CREATININE 1.3 1.1 0.8     Serum creatinine: 0.8 mg/dL 02/16/22 0410  Estimated creatinine clearance: 63.5 mL/min    Cefepime 2g IV q12 will be changed to cefepime 2g IV q8.     Pharmacist's Name: Katherine Carrillo  Pharmacist's Extension: [phone]

## 2022-02-16 NOTE — DISCHARGE SUMMARY
[facility] - Oncology ([facility])  Hematology  Bone Marrow Transplant  Discharge Summary      Patient Name: Susan Puente  MRN: [id number]  Admission Date: 2/14/2022  Hospital Length of Stay: 0 days  Discharge Date and Time: 2/16/2022  3:44 PM  Attending Physician: No att. providers found   Discharging Provider: Jolanta Vasquez PA-C  Primary Care Provider: Claudia Rapp MD    HPI: 70yo female with MDS on vidaza s/p cycle 37 on 2/7, CAD s/p 3 stents 20 years ago, DM, GERD presents for AMS and hypotenstion. Patient states she was in her normal state of health and came for lab draw today. Per nursing notes code blue was called after patient was found unresponsive with pulses while in BCC. Unclear how long she was out. She reports she developed flushing and lightheadedness just prior to episode but does not remember anything until arrival to ED.    In the ED, initial BP 70/40, HR 80s, and satting well on RA. Labs with WBC of 2.4, hgb 6.7 (baseline 6.5-8), plt 210, Cr 1.3 (baseline 1), mildly elevated LFTs, LA 4.9, trop and BNP WNL, BG 160s, UA non infectious. CXR with no acute findings. She was given 1L IVFs with improvement of LA to 1.4 and normalization of BP and 1 u prbc. Also given doses of vanc, zosyn. She was then admitted to BMT for further care.     On my interview, patient back to baseline and conversing normally. Reports she has had these episodes of the past year while at home. Occurring about once every 3 months. States preceding hot flashes and lightheadedness followed by syncope. No palpitations. She does not know how long she loses consciousness for. Denies recent fever, cough, chest pain, n/v/d.       * No surgery found *     Hospital Course: Patient admitted from the ED on February 15th from clinic infusion after patient passed out and unresponsive in clinic. Code blue was called, patient had pulses and returned to baseline shortly after the event. She was briefly altered surrounding event and  hypotensive. She reported flushing and lightheadedness prior to the episode. Pt reports history of similar episodes and associates these with poor PO intake and dehydration. In the ED she was noted to be hypotensive, anemic, with slight NOEL. UA and CXR were negative. She received 1L IVF with improvement followed by unit of PRBC. Blood cultures were drawn and pt received broad spec abx, blood cultures NGTD at time of discharge, AF, and VSS. She is being discharged to home in improved condition.       Goals of Care Treatment Preferences:  Code Status: DNR    Living Will: Yes                  Significant Diagnostic Studies: Labs:   CMP   Recent Labs   Lab 02/16/22  0410      K 3.9      CO2 26      BUN 22   CREATININE 0.8   CALCIUM 8.7   PROT 5.8*   ALBUMIN 2.9*   BILITOT 0.4   ALKPHOS 48*   AST 32   ALT 61*   ANIONGAP 7*   ESTGFRAFRICA >60.0   EGFRNONAA >60.0    and CBC   Recent Labs   Lab 02/16/22  0410   WBC 2.05*   HGB 7.9*   HCT 24.6*   PLT 96*       Pending Diagnostic Studies:     None        Final Active Diagnoses:    Diagnosis Date Noted POA    PRINCIPAL PROBLEM:  Severe sepsis [A41.9, R65.20] 02/15/2022 Unknown    Controlled type 2 diabetes mellitus without complication, without long-term current use of insulin [E11.9] 07/17/2021 Yes    Adjustment disorder with mixed anxiety and depressed mood [F43.23] 03/05/2018 Yes    MDS (myelodysplastic syndrome) with 5q deletion [D46.C] 06/08/2017 Yes    Essential hypertension [I10] 12/01/2016 Yes    Anemia requiring transfusions [D64.9] 11/10/2016 Yes    CAD (coronary artery disease) [I25.10]  Yes      Problems Resolved During this Admission:      Discharged Condition: stable    Disposition: Home or Self Care    Follow Up:  Future Appointments   Date Time Provider Department Center   2/17/2022  9:00 AM [facility] OIC-CT1 500 LB LIMIT [facility]   2/17/2022  9:40 AM [facility] LAB BMT [facility] LABBMT Fidencio Jnoes   2/18/2022  1:00 PM CHEMO 1 [facility]  [facility] CHEMO [facility]   2/21/2022  9:40 AM [facility] BMT [facility]   2/22/2022  1:00 PM NURSE 10, [facility] CHE[facility]   2/23/2022  2:00 PM Sonia Robledo, PT [facility] OP RHB [facility]   2/24/2022  9:40 AM [facility] BMT [facility]   2/25/2022  1:00 PM CHEMO 39, [facility]   2/28/2022  9:40 AM [facility] BMT [facility] LAB[facility]   2/28/2022  1:00 PM Sonia Robledo, PT [facility] OP RHB [facility]   3/2/2022  1:00 PM NURSE 5, [facility]   3/3/2022  9:40 AM [facility] LAB BMT [facility] LAB[facility]   3/3/2022  1:00 PM Lyly Norman, PT [facility] OP RHB [facility]   3/4/2022  1:00 PM NURSE 10, [facility]   3/7/2022 10:00 AM [facility] LAB BMT [facility] LAB[facility]   3/7/2022 11:00 AM Jolanta Vasquez PA-C [facility] HC BMT [facility]   3/7/2022 11:30 AM INJECTION, [facility] INFUSION [facility] CHE[facility]   3/8/2022  1:00 PM CHEMO 11 [facility]   3/9/2022 11:00 AM INJECTION, [facility] INFUSION [facility]   3/10/2022  9:00 AM [facility] LAB BMT [facility] LAB[facility]   3/10/2022  9:45 AM INJECTION, [facility] INFUSION [facility]   3/11/2022  1:00 PM CHEMO 11 [facility] CHE[facility]   3/14/2022  9:00 AM [facility] LAB BMT [facility] LAB[facility]MT [facility]   3/14/2022  1:00 PM Lyly Norman, PT [facility] OP RHB [facility]   3/15/2022  1:00 PM CHAIR 5, [facility] BMT INF [facility] B[facility] INF [facility]   3/16/2022  1:00 PM Sonia Robledo, PT [facility] OP RHB [facility]   3/17/2022  9:00 AM [facility] LAB BMT [facility] LAB[facility]MT [facility]   3/18/2022  1:00 PM CHEMO 11 [facility] CHE[facility]   3/21/2022  9:00 AM [facility] LAB BMT [facility] LAB[facility]MT [facility]   3/21/2022  1:00 PM Sonia Robledo, PT [facility] OP RHB [facility]   3/22/2022  1:00 PM NURSE 10, [facility] CHE[facility]   3/23/2022  1:00 PM Sonia Robledo, PT [facility] RHB [facility]   3/24/2022  9:00 AM [facility] LAB BMT [facility] LAB[facility]   3/25/2022  1:00 PM  CHEMO 11 [facility] CHE[facility]   3/28/2022  9:00 AM [facility] LAB BMT [facility] LAB[facility]MT [facility]   3/28/2022  1:00 PM Lyly Norman, PT [facility] OP RHB [facility]   3/29/2022  1:00 PM CHEMO 11 [facility] CHE[facility]   3/30/2022  1:00 PM Sonia Robledo, PT [facility] RHB [facility]   3/31/2022  9:00 AM [facility] LAB BMT [facility] LAB[facility]MT [facility]   4/1/2022  1:00 PM CHEMO 11 [facility] CHE[facility]   4/5/2022  1:00 PM CHEMO 11 [facility] CHE[facility]         Patient Instructions:      Notify your health care provider if you experience any of the following:  temperature >100.4     Notify your health care provider if you experience any of the following:  persistent nausea and vomiting or diarrhea     Notify your health care provider if you experience any of the following:  severe uncontrolled pain     Notify your health care provider if you experience any of the following:  increased confusion or weakness     Notify your health care provider if you experience any of the following:  persistent dizziness, light-headedness, or visual disturbances     Notify your health care provider if you experience any of the following:  severe persistent headache     Notify your health care provider if you experience any of the following:  difficulty breathing or increased cough     Activity as tolerated     Medications:  Reconciled Home Medications:      Medication List      CHANGE how you take these medications    ciprofloxacin HCl 500 MG tablet  Commonly known as: CIPRO  Take 1 tablet (500 mg total) by mouth 2 (two) times daily.  What changed: Another medication with the same name was removed. Continue taking this medication, and follow the directions you see here.     citalopram 20 MG tablet  Commonly known as: CeleXA  Take 1 tablet (20 mg total) by mouth once daily.  What changed: Another medication with the same name was removed. Continue taking this medication, and follow the directions you see here.        CONTINUE  taking these medications    acetaminophen 650 MG Tbsr  Commonly known as: TYLENOL  Take 650 mg by mouth every 8 (eight) hours as needed (for pain).     acyclovir 400 MG tablet  Commonly known as: ZOVIRAX  Take 1 tablet (400 mg total) by mouth 2 (two) times daily.     aspirin 81 MG EC tablet  Commonly known as: ECOTRIN  Take 1 tablet (81 mg total) by mouth once daily.     carvediloL 12.5 MG tablet  Commonly known as: COREG  Take 1 tablet (12.5 mg total) by mouth 2 (two) times daily with meals.     fluconazole 200 MG Tab  Commonly known as: DIFLUCAN  Take 2 tablets (400 mg total) by mouth once daily.     gabapentin 100 MG capsule  Commonly known as: NEURONTIN  Take 1 capsule (100 mg total) by mouth 2 (two) times daily.     lisinopriL-hydrochlorothiazide 20-12.5 mg per tablet  Commonly known as: PRINZIDE,ZESTORETIC  Take 2 tablets by mouth once daily.     loratadine 10 mg tablet  Commonly known as: CLARITIN  Take 10 mg by mouth daily as needed.     magic mouthwash diphen/antac/lidoc  Swish and spit 15 mls every 4 (four)  hours as needed.     magnesium 30 mg Tab  Take by mouth once.     melatonin 10 mg Tab  Take 1 tablet (10 mg total) by mouth once daily.     metFORMIN 500 MG ER 24hr tablet  Commonly known as: GLUCOPHAGE-XR  Take 1 tablet (500 mg total) by mouth daily with breakfast.     MULTIVITAMIN ORAL  Take by mouth.     ondansetron 8 MG Tbdl  Commonly known as: ZOFRAN-ODT  Dissolve 1 tablet by mouth every 6 hours.     pantoprazole 40 MG tablet  Commonly known as: PROTONIX  Take 1 tablet (40 mg total) by mouth once daily.     potassium chloride SA 20 MEQ tablet  Commonly known as: K-DUR,KLOR-CON  Take 1 tablet (20 mEq total) by mouth once daily.     REPATHA SURECLICK 140 mg/mL Pnij  Generic drug: evolocumab  Inject 140mg (1 pen) into the skin every 2 weeks.     TRUE METRIX GLUCOSE TEST STRIP Strp  Generic drug: blood sugar diagnostic  To check blood sugar 1 times daily     TRUEPLUS LANCETS 30 gauge Misc  Generic  drug: lancets  Use to test blood sugar daily     turmeric 400 mg Cap  Take 1 capsule by mouth once daily.     * VIDAZA INJ  Inject as directed every 28 days.     * azaCITIDine 100 mg Solr chemo injection  Commonly known as: VIDAZA         * This list has 2 medication(s) that are the same as other medications prescribed for you. Read the directions carefully, and ask your doctor or other care provider to review them with you.            STOP taking these medications    fexofenadine 180 MG tablet  Commonly known as: DELMY Vasquez PA-C  Bone Marrow Transplant  [facility] - Oncology ([facility])

## 2022-02-16 NOTE — PLAN OF CARE
Problem: Occupational Therapy Goal  Goal: Occupational Therapy Goal    Description: OT evaluation indicates that patient is independent in ADLs and functions at previous level of mobility. Patient will be discharged from patient services     Outcome: Met

## 2022-02-23 PROBLEM — R29.3 POOR POSTURE: Status: RESOLVED | Noted: 2022-01-01 | Resolved: 2022-01-01

## 2022-02-23 PROBLEM — M75.40 SHOULDER IMPINGEMENT SYNDROME, UNSPECIFIED LATERALITY: Status: RESOLVED | Noted: 2022-01-01 | Resolved: 2022-01-01

## 2022-02-23 PROBLEM — R29.898 WEAKNESS OF BOTH UPPER EXTREMITIES: Status: RESOLVED | Noted: 2022-01-01 | Resolved: 2022-01-01

## 2022-02-23 PROBLEM — M25.619 DECREASED RANGE OF MOTION OF SHOULDER: Status: RESOLVED | Noted: 2022-01-01 | Resolved: 2022-01-01

## 2022-02-23 NOTE — PROGRESS NOTES
PHYSICAL THERAPY  Discharge  Date of Discharge 2/23/2022    Name: Susan Puente  [facility] #: [id number]    Pt was seen in Physical Therapy for initial evaluation on: 2/10/2022  Pt's last date of treatment in Physical Therapy was: 2/10/2022  Number of treatment sessions completed: eval only  Reason for discharge:    Patient beginning home health therapy after recent hospitalization.   Status at Discharge:  Goals not met

## 2022-02-25 NOTE — PLAN OF CARE
Patient here for transfusion. Labs values below  Lab Results   Component Value Date    WBC 2.68 (L) 02/24/2022    HGB 7.0 (L) 02/24/2022    HCT 22.4 (L) 02/24/2022    MCV 94 02/24/2022    PLT 82 (L) 02/24/2022   Patient tolerated 1 unit PRBC  with VSS and no complications. Patient instructed to call clinic with any problems or concerns. Patient verbalize understanding. AVS given and patient discharged home.     Problem: Anemia  Goal: Anemia Symptom Improvement  Outcome: Ongoing, Progressing

## 2022-03-03 NOTE — TELEPHONE ENCOUNTER
Review with Dr. Valdes and she is okay with holding physical therapy the week of the Vidaza injections. Notified Juyl Jamison (PT)

## 2022-03-03 NOTE — TELEPHONE ENCOUNTER
"----- Message from James Lincoln sent at 3/3/2022 12:30 PM CST -----  Consult/Advisory:          Name Of Caller:  July Puente (Pt's Physical Therapist)      Contact Preference?: [phone]      Provider Name: Lucio      Does patient feel the need to be seen today? No      What is the nature of the call?: Requesting to put pt physical therapy visits on hold for 3/6-3/12 due to pt having Chemo.          Additional Notes:  "Thank you for all that you do for our patients"      "

## 2022-03-07 NOTE — PROGRESS NOTES
Section of Hematology and Stem Cell Transplantation  Follow Up Note     Visit Date: 03/07/2022    Primary Oncologic Diagnosis: MDS (myelodysplastic syndrome) with 5q deletion [D46.C]    History of Present Ilness:   Susan Puente (Susan) is a pleasant 71 y.o.female with PMHx of T2DM, peripheral vascual disease, CAD, hyperlipidemia, HTN, and MDS 5q del syndrome currently on Vidaza as third line therapy.       Interval History:   Ms. Puente presents today for routine follow up prior to Cycle 38 of Vidaza.  She continues to be dependent on transfusions intermittently. She was recently hospitalized prior to last cycle of chemo due to hypotensive episode where patient passed out in infusion center, she was felt to be very dehydrated and improved rapidly upon fluid/electrolyte resuscitation. She has been partaking in  physical therapy since discharge and is feeling much improved and stronger, which is very pleasing to her. She denies fevers, chills, abdominal pain, cough, CP/SOB, n/v/d/c. She is ambulating independently with walker.       Oncology History Summary:   Per Dr. Valdes's prior notes:  Ms. Puente is a 68 year old female with hx of DM2, peripheral vascular disease, tobacco use, CAD, hyperlipidemia, hypertension who was hospitalized 11/10/16 for anemia. hgb 5.3  MCH 43.4 with normal WBC and platelets. Patient had normal iron stores. She was transfused 3 units of PRBCs and discharged home with hgb 8.7 on 11/11/16. She was referred for further evalutation of her anemia. On 12/16/16 patient had a normal SPEP and immunofixation. Slightly elevated kappa light chains with normal ration. Elevated vitamin b12, normal folate, JAYDEN was positive with a low titer and negative profile. Patient had a bone marrow biopsy 1/5/16 which showed the core biopsy is normocellular for age (40%); however, megakaryocytes are increased and show frequent small, hypolobated forms. Additionally, a subset of the neutrophils  are hypogranular. Blasts are not increased by either morphology (1.2%) or in the corresponding flow cytometric analysis. Fish detects a 5q deletion in 54.5% of nuclei. Cytogenetics reported 20 metaphases, 2 metaphases were normal and 18 metaphases had a 5q deletion. No additional cytogenetic abnormalities were detected. Findings consistent with 5q deletion syndrome.     Patient had a delay in obtaining Revlimid 10 mg daily but did start taking the medication 2/8/17. On 2/9/17 patient developed a diffuse maculopapular rash throughout scalp arms, legs and torso. She states she had no stridor or wheezing. She discontinued medication 2/15/17. Went to allergist who provided references on desensitization so that patient could resume Revlimid. Unfortunately developed pancytopenia and Revlimid stopped 6/16/17. She had a repeat BMBX  performed 6/8/17 and showed a hypercellular marrow (60%) continued atypia in the granulocytes and megakaryocytes were noted.  Additionally, there is erythroid atypia present. No increase in blasts. Cytogenetics are normal and MDS FISH is negative, failing to show 5 q minus. NGS should no significant molecular mutations.  Anemia work up revealed bienvenido negative hemolysis.     Revlimid has been stopped since June 2017 after she developed pancytopenia while on Revlimid (required desensitization). CBC was normal for almost 1 year and marrow was negative for del5q. Bone marrow biopsy repeat from 6/2018 showed relapsed 5q minus MDS without new or additional mutations. Revlimid restarted at 2.5 mg daily with prednisone to prevent allergic reaction. Titrated to a goal of 10mg daily Revlimid. Hospital admission 3/12-3/17/19 for unresponsive event after blood transfusion, found to be profoundly pancytopenic at admission. Since hospital admission Revlimid has been stopped. Repeat marrow March 2019 shows persistent MDS with 5q minus.      Started cycle 1 Vidaza 5/6/19 subq for 5 days every 28 days.  Restaging bone marrow biopsy from 10/24/19 shows persistent MDS, no excess blasts and 3 of 20 metaphases with 5q deletion.      Past Medical History, Social History, and Past Family History are unchanged since last evaluation except for HPI.     CURRENT MEDICATIONS:   Current Outpatient Medications   Medication Sig    acetaminophen (TYLENOL) 650 MG TbSR Take 650 mg by mouth every 8 (eight) hours as needed (for pain).    acyclovir (ZOVIRAX) 400 MG tablet Take 1 tablet (400 mg total) by mouth 2 (two) times daily.    azacitidine (VIDAZA INJ) Inject as directed every 28 days.    azaCITIDine (VIDAZA) 100 mg SolR chemo injection     blood sugar diagnostic Strp To check blood sugar 1 times daily (Patient taking differently: To check blood sugar 1 times daily)    ciprofloxacin HCl (CIPRO) 500 MG tablet Take 1 tablet (500 mg total) by mouth 2 (two) times daily.    gabapentin (NEURONTIN) 100 MG capsule Take 1 capsule (100 mg total) by mouth 2 (two) times daily.    lancets 30 gauge Misc Use to test blood sugar daily (Patient taking differently: Use to test blood sugar daily)    lisinopriL-hydrochlorothiazide (PRINZIDE,ZESTORETIC) 20-12.5 mg per tablet Take 2 tablets by mouth once daily.    loratadine (CLARITIN) 10 mg tablet Take 10 mg by mouth daily as needed.     magic mouthwash diphen/antac/lidoc Swish and spit 15 mls every 4 (four)  hours as needed.    magnesium 30 mg Tab Take by mouth once.    metFORMIN (GLUCOPHAGE-XR) 500 MG ER 24hr tablet Take 1 tablet (500 mg total) by mouth daily with breakfast.    MULTIVITAMIN ORAL Take by mouth.    ondansetron (ZOFRAN-ODT) 8 MG TbDL Dissolve 1 tablet by mouth every 6 hours.    pantoprazole (PROTONIX) 40 MG tablet Take 1 tablet (40 mg total) by mouth once daily.    potassium chloride SA (K-DUR,KLOR-CON) 20 MEQ tablet Take 1 tablet (20 mEq total) by mouth once daily.    turmeric 400 mg Cap Take 1 capsule by mouth once daily.    aspirin (ECOTRIN) 81 MG EC tablet Take  1 tablet (81 mg total) by mouth once daily.    carvediloL (COREG) 12.5 MG tablet Take 1 tablet (12.5 mg total) by mouth 2 (two) times daily with meals.    citalopram (CELEXA) 20 MG tablet Take 1 tablet (20 mg total) by mouth once daily.    evolocumab (REPATHA SURECLICK) 140 mg/mL PnIj Inject 140mg (1 pen) into the skin every 2 weeks.    fluconazole (DIFLUCAN) 200 MG Tab Take 2 tablets (400 mg total) by mouth once daily.    magnesium oxide (MAGOX) 400 mg (241.3 mg magnesium) tablet Take 1 tablet (400 mg total) by mouth once daily.    melatonin 10 mg Tab Take 1 tablet (10 mg total) by mouth once daily.     No current facility-administered medications for this visit.     Facility-Administered Medications Ordered in Other Visits   Medication    diphenhydrAMINE capsule 25 mg       ALLERGIES:   Review of patient's allergies indicates:   Allergen Reactions    Revlimid [lenalidomide] Swelling     Facial swelling  6/8/17 - in the process of desensitation         Review of Systems:     Review of Systems   Constitutional: Negative for chills, fever and malaise/fatigue.   HENT: Negative for congestion, sinus pain and sore throat.    Eyes: Negative.    Respiratory: Negative for shortness of breath.    Cardiovascular: Negative for chest pain and leg swelling.   Gastrointestinal: Negative for abdominal pain and nausea.   Musculoskeletal: Negative for back pain and myalgias.   Skin: Negative for itching and rash.   Neurological: Negative for tremors, weakness and headaches.   Psychiatric/Behavioral: Negative.        Physical Exam:     Vitals:    03/07/22 1120   BP: 132/63   Pulse: 78   Resp: 16       Physical Exam  Vitals and nursing note reviewed.   Constitutional:       Appearance: Normal appearance.   HENT:      Head: Normocephalic.      Right Ear: External ear normal.      Left Ear: External ear normal.      Nose: Nose normal.      Mouth/Throat:      Mouth: Mucous membranes are moist.   Eyes:      Extraocular Movements:  Extraocular movements intact.      Pupils: Pupils are equal, round, and reactive to light.   Cardiovascular:      Rate and Rhythm: Normal rate and regular rhythm.   Pulmonary:      Effort: Pulmonary effort is normal.      Breath sounds: Normal breath sounds.   Abdominal:      General: Bowel sounds are normal.      Palpations: Abdomen is soft.   Musculoskeletal:         General: Normal range of motion.      Cervical back: Neck supple.      Right lower leg: No edema.      Left lower leg: No edema.   Skin:     General: Skin is warm.      Coloration: Skin is pale.   Neurological:      General: No focal deficit present.      Mental Status: She is alert and oriented to person, place, and time.   Psychiatric:         Mood and Affect: Mood normal.           ECOG Performance Status: (foot note - ECOG PS provided by Eastern Cooperative Oncology Group) 1 - Symptomatic but completely ambulatory    Karnofsky Performance Score:  70%- Cares for Self: Unable to Carry on Normal Activity or Active Work    Labs:   Lab Results   Component Value Date    WBC 2.05 (L) 03/07/2022    HGB 8.4 (L) 03/07/2022    HCT 26.3 (L) 03/07/2022    MCV 95 03/07/2022     03/07/2022       Lab Results   Component Value Date     03/07/2022    K 4.6 03/07/2022     03/07/2022    CO2 25 03/07/2022    BUN 24 (H) 03/07/2022    CREATININE 0.8 03/07/2022    ALBUMIN 3.6 03/07/2022    BILITOT 0.3 03/07/2022    ALKPHOS 62 03/07/2022    AST 25 03/07/2022    ALT 39 03/07/2022          Assessment and Plan:   Susan Montalvo) is a pleasant 71 y.o.female with MDS.    MDS  - Primary oncologist, Dr. 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 Developed allergy and was then desensitized. Soon after developed pancytopenia and Revlimid held since June 2017.    - BMBX on 6/8/17 was with normal cytogenetics. 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 Discussed treatment options of HMA therapy or repeat trial  of Revlimid and patient wished to proceed with Revlimid   - Revlimid stopped again due to repeat allergic reaction and pancytopenia 3/2019  - Started cycle 1 Vidaza 5/6/19 subq for 5 days every 28 days  - Restaging bone marrow biopsy following cycle 5 from 10/24/19 shows persistent MDS, no excess blasts and 3 of 20 metaphases with 5q deletion  - Tolerated previous cycles well. Package insert for Vidaza recommends holding if ANC <1000, however pt has been receiving this with an ANC below 1000 for each cycle. Okay to continue to give despite neutropenia per Dr. Valdes  - Restaging marrow on 04/21/21 with persistent MDS with isolated del 5Q. Of 20 metaphases, 5 were normal and 15 had a 5q deletion, NGS positive for SF3B1 and TP53 (12%). 1.4% blasts  - Received vidaza for 22 cycles, received Decitabine for C23 due to national shortage of vidaza and then back to vidaza with C24.  - Ok to proceed with cycle 38 today  - Tolerating well, takes zofran PRN for nausea     Cytopenias 2/2 disease: anemia and neutropenia  - Transfuse for hgb < 7  - Continue ppx acyclovir, cipro, fluconazole (only taking 200mg due to previously elevated LFTs)  - can discuss stopping ppx antibiotic and antifungal at next MD visit given ANC >500  - Transfusion dependent, receives twice weekly labs, has chair on hold for transfusions    Abnormal CT chest  - presented to the ER 8/27 for chest pain  - No cardiac etiology identified  - improved after blood transfusion  - CTA of chest performed shows 2cm RUL spiculated mass suspicious for a primary lung malignancy in a prior smoker  - Lung biopsy in IR was scheduled on 11/3. Per IR lung nodules almost resolved, cancelled bx.   - Needs repeat CT Chest to evaluate Lung Nodules - will discuss at next visit      DM2  - Management per PCP  - Continue metformin  - Blood glucoses normal.     HTN  - Management per cardiologist   - Continue lisinopril-HCTZ and coreg  - bp wnl     CAD  - S/P left carotid  endarterectomy prior to cancer dx  - Continue Repatha for HLD per PCP (intolerant to statins)  - Continue on ASA 81mg daily  - Seen by vascular surgery, Dr. Murguia. No intervention needed as asymptomatic     Adjustment disorder  - Improved mood on Celexa, refill requested  - Saw psych onc historically, and mood stable so no need to get scheduled at this time     Myalgias/possible sciatica  - Started trial of gabapentin 100 mg bid 11/4/19 with improvement  - Mostly taking daily gabapentin in AMs  - Return to [facility] for water aerobics once able  - Received new Rolator walker  - Continue home health PT     Insomnia  - Using 10mg melatonin OTC or tylenol PM     Neuropathy  - continue gabapentin          Orders Placed:      Orders Placed This Encounter    melatonin 10 mg Tab    magnesium oxide (MAGOX) 400 mg (241.3 mg magnesium) tablet    fluconazole (DIFLUCAN) 200 MG Tab    citalopram (CELEXA) 20 MG tablet         Follow Up:          BMT Chart Routing      Follow up with physician 4 weeks. F/u with Dr. Valdes in 1 month for C39 Vidaza (April 4)   Follow up with WAYNE    Labs CBC, CMP, phosphorus and magnesium   Lab interval: every 4 weeks  Schedule C39 Days 1-5 of Vidaza at infusion center 4/4-4/8.    Imaging    Pharmacy appointment    Other referrals             Thank you for allowing me to partake in the care of this patient.       Jolanta Guy PA-C  Hematology, Oncology, and Stem Cell Transplantation  [facility]

## 2022-03-09 NOTE — NURSING
Pt here for vidaza injections. Tolerated all 3 injections to lower left abdomen without difficulty.

## 2022-03-10 NOTE — NURSING
Pt here for vidaza injections. Tolerated all 3 injections to right lower abdomen without difficulty.

## 2022-03-14 NOTE — TELEPHONE ENCOUNTER
[facility]EF[facility]ST[facility] Initial phase. Copay $141.  Forwarded to FA for review.

## 2022-03-15 NOTE — PLAN OF CARE
Patient here for blood transfusion and hydration. Lab values below  Lab Results   Component Value Date    WBC 2.35 (L) 03/14/2022    HGB 6.5 (L) 03/14/2022    HCT 20.6 (L) 03/14/2022    MCV 95 03/14/2022     03/14/2022     Patient tolerated 1 unit PRBC and 1 liter NS  with VSS and no complications. Patient instructed to call clinic with any problems or concerns. Patient verbalize understanding. AVS given and patient discharged home.

## 2022-03-16 NOTE — TELEPHONE ENCOUNTER
Specialty Pharmacy - Refill Coordination    Specialty Medication Orders Linked to Encounter    Flowsheet Row Most Recent Value   Medication #1 evolocumab (REPATHA SURECLICK) 140 mg/mL PnIj (Order#[id number], Rx#[id number])          Refill Questions - Documented Responses    Flowsheet Row Most Recent Value   Patient Availability and HIPAA Verification    Does patient want to proceed with activity? Yes   HIPAA/medical authority confirmed? Yes   Relationship to patient of person spoken to? Self   Refill Screening Questions    Would patient like to speak to a pharmacist? No   When does the patient need to receive the medication? 03/20/22   Refill Delivery Questions    How will the patient receive the medication? Delivery Patricia   When does the patient need to receive the medication? 03/20/22   Shipping Address Home   Address in [facility] confirmed and updated if neccessary? Yes   Expected Copay ($) 0   Is the patient able to afford the medication copay? Yes   Payment Method zero copay   Days supply of Refill 84   Supplies needed? No supplies needed   Refill activity completed? Yes   Refill activity plan Refill scheduled   Shipment/Pickup Date: 03/18/22          Current Outpatient Medications   Medication Sig    acetaminophen (TYLENOL) 650 MG TbSR Take 650 mg by mouth every 8 (eight) hours as needed (for pain).    acyclovir (ZOVIRAX) 400 MG tablet Take 1 tablet (400 mg total) by mouth 2 (two) times daily.    aspirin (ECOTRIN) 81 MG EC tablet Take 1 tablet (81 mg total) by mouth once daily.    azacitidine (VIDAZA INJ) Inject as directed every 28 days.    azaCITIDine (VIDAZA) 100 mg SolR chemo injection     blood sugar diagnostic Strp To check blood sugar 1 times daily (Patient taking differently: To check blood sugar 1 times daily)    carvediloL (COREG) 12.5 MG tablet Take 1 tablet (12.5 mg total) by mouth 2 (two) times daily with meals.    ciprofloxacin HCl (CIPRO) 500 MG tablet Take 1 tablet (500 mg total)  by mouth 2 (two) times daily.    citalopram (CELEXA) 20 MG tablet Take 1 tablet (20 mg total) by mouth once daily.    evolocumab (REPATHA SURECLICK) 140 mg/mL PnIj Inject 140mg (1 pen) into the skin every 2 weeks.    fluconazole (DIFLUCAN) 200 MG Tab Take 2 tablets (400 mg total) by mouth once daily.    gabapentin (NEURONTIN) 100 MG capsule Take 1 capsule (100 mg total) by mouth 2 (two) times daily.    lancets 30 gauge Misc Use to test blood sugar daily (Patient taking differently: Use to test blood sugar daily)    lisinopriL-hydrochlorothiazide (PRINZIDE,ZESTORETIC) 20-12.5 mg per tablet Take 2 tablets by mouth once daily.    loratadine (CLARITIN) 10 mg tablet Take 10 mg by mouth daily as needed.     magic mouthwash diphen/antac/lidoc Swish and spit 15 mls every 4 (four)  hours as needed.    magnesium 30 mg Tab Take by mouth once.    magnesium oxide (MAGOX) 400 mg (241.3 mg magnesium) tablet Take 1 tablet (400 mg total) by mouth once daily.    melatonin 10 mg Tab Take 1 tablet (10 mg total) by mouth once daily.    metFORMIN (GLUCOPHAGE-XR) 500 MG ER 24hr tablet Take 1 tablet (500 mg total) by mouth daily with breakfast.    MULTIVITAMIN ORAL Take by mouth.    ondansetron (ZOFRAN-ODT) 8 MG TbDL Dissolve 1 tablet by mouth every 6 hours.    pantoprazole (PROTONIX) 40 MG tablet Take 1 tablet (40 mg total) by mouth once daily.    potassium chloride SA (K-DUR,KLOR-CON) 20 MEQ tablet Take 1 tablet (20 mEq total) by mouth once daily.    turmeric 400 mg Cap Take 1 capsule by mouth once daily.   Last reviewed on 3/11/2022  4:54 PM by Hernan Fajardo RN    Review of patient's allergies indicates:   Allergen Reactions    Revlimid [lenalidomide] Swelling     Facial swelling  6/8/17 - in the process of desensitation    Last reviewed on  3/15/2022 1:34 PM by Krystin Harris      Tasks added this encounter   No tasks added.   Tasks due within next 3 months   3/7/2022 - Refill Call (Auto Added)     Marisela Francois  [facility]  [facility]  [facility] [address]  Phone: [phone]  Fax: [phone]

## 2022-03-16 NOTE — TELEPHONE ENCOUNTER
Stu permission received per patient     Approved     Bin [id number]  DEVANTE [id number]  [facility] [id number]  ID [id number]    Loaded to EWELINA

## 2022-03-22 NOTE — PLAN OF CARE
1507-Pt tolerated 2units PRBC's transfusions well, no complications or side effects, no transfusion reactions noted,POC and meds discussed with pt, pt aware of upcoming appts, pt knows to call MD with any questions or concerns. Pt ambulated off unit, no distress noted.

## 2022-04-04 NOTE — PROGRESS NOTES
"Route Chart for Scheduling    BMT Chart Routing      Follow up with physician 4 weeks. 5/2 with time for Viadaza injection daily 5/2-5/6   Follow up with WAYNE    Labs CBC and CMP   Lab interval:     Imaging    Pharmacy appointment    Other referrals          Treatment Plan Information   OP AZACITIDINE 5-DAY (SUB-Q)   Gita Valdes MD   Upcoming Treatment Dates - OP AZACITIDINE 5-DAY (SUB-Q)    4/5/2022       Chemotherapy       azaCITIDine (VIDAZA) chemo injection 145 mg  4/6/2022       Chemotherapy       azaCITIDine (VIDAZA) chemo injection 145 mg  4/7/2022       Chemotherapy       azaCITIDine (VIDAZA) chemo injection 145 mg  4/8/2022       Chemotherapy       azaCITIDine (VIDAZA) chemo injection 145 mg    Supportive Plan Information  IV FLUIDS AND ELECTROLYTES   Eden Salmeron NP   Upcoming Treatment Dates - IV FLUIDS AND ELECTROLYTES    No upcoming days in selected categories.    Therapy Plan Information  Flushes  heparin, porcine (PF) 100 unit/mL injection flush 500 Units  500 Units, Intravenous, Every visit  sodium chloride 0.9% flush 10 mL  10 mL, Intravenous, Every visit        Subjective:       Patient ID: Susan Puente is a 71 y.o. female.    Interval History: Ms. 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 She continues to be dependent on transfusions intermittently. She reports feeling well overall today but has had chronic diarrhea since hospital discharge. This does respond to immodium.  She reports chronic "arthritis" pain, mostly to the arms. She reports improvement in gait and stability with PT.  Has chronic occasional nausea, mostly when she doesn't eat. No recent fevers, chills, chest pain, sob, or constipation.    Oncology History (per prior notes from Dr. Valdes)  71 year old female with hx of DM2, peripheral vascular disease, tobacco use, CAD, hyperlipidemia, hypertension who was hospitalized 11/10/16 for anemia. " hgb 5.3  MCH 43.4 with normal WBC and platelets. Patient had normal iron stores. She was transfused 3 units of PRBCs and discharged home with hgb 8.7 on 11/11/16. She was referred for further evalutation of her anemia. On 12/16/16 patient had a normal SPEP and immunofixation. Slightly elevated kappa light chains with normal ration. Elevated vitamin b12, normal folate, JAYDEN was positive with a low titer and negative profile. Patient had a bone marrow biopsy 1/5/16 which showed the core biopsy is normocellular for age (40%); however, megakaryocytes are increased and show frequent small, hypolobated forms. Additionally, a subset of the neutrophils are hypogranular. Blasts are not increased by either morphology (1.2%) or in the corresponding flow cytometric analysis. Fish detects a 5q deletion in 54.5% of nuclei. Cytogenetics reported 20 metaphases, 2 metaphases were normal and 18 metaphases had a 5q deletion. No additional cytogenetic abnormalities were detected. Findings consistent with 5q deletion syndrome.     Patient had a delay in obtaining Revlimid 10 mg daily but did start taking the medication 2/8/17. On 2/9/17 patient developed a diffuse maculopapular rash throughout scalp arms, legs and torso. She states she had no stridor or wheezing. She discontinued medication 2/15/17. Went to allergist who provided references on desensitization so that patient could resume Revlimid. Unfortunately developed pancytopenia and Revlimid stopped 6/16/17. She had a repeat BMBX  performed 6/8/17 and showed a hypercellular marrow (60%) continued atypia in the granulocytes and megakaryocytes were noted.  Additionally, there is erythroid atypia present. No increase in blasts. Cytogenetics are normal and MDS FISH is negative, failing to show 5 q minus. NGS should no significant molecular mutations.  Anemia work up revealed bienvenido negative hemolysis.    Revlimid has been stopped since June 2017 after she developed pancytopenia  while on Revlimid (required desensitization). CBC was normal for almost 1 year and marrow was negative for del5q. Bone marrow biopsy repeat from 6/2018 showed relapsed 5q minus MDS without new or additional mutations. Revlimid restarted at 2.5 mg daily with prednisone to prevent allergic reaction. Titrated to a goal of 10mg daily Revlimid. Hospital admission 3/12-3/17/19 for unresponsive event after blood transfusion, found to be profoundly pancytopenic at admission. Since hospital admission Revlimid has been stopped. Repeat marrow March 2019 shows persistent MDS with 5q minus.     Started cycle 1 Vidaza 5/6/19 subq for 5 days every 28 days. Restaging bone marrow biopsy from 10/24/19 shows persistent MDS, no excess blasts and 3 of 20 metaphases with 5q deletion.    Review of Systems   Constitutional: Negative for chills, diaphoresis, fatigue and fever.   HENT: Negative for congestion, ear pain, mouth sores, nosebleeds, rhinorrhea, sinus pressure, sinus pain, sore throat and trouble swallowing.    Eyes: Negative for pain, discharge and redness.   Respiratory: Negative for apnea, cough, chest tightness and shortness of breath.    Cardiovascular: Negative for chest pain, palpitations and leg swelling.   Gastrointestinal: Positive for nausea (intermittent). Negative for abdominal distention, abdominal pain, blood in stool, constipation, diarrhea and vomiting.   Genitourinary: Negative for dysuria and hematuria.   Musculoskeletal: Positive for arthralgias (mostly shoulders). Negative for back pain, gait problem and joint swelling.   Skin: Negative for rash.   Neurological: Negative for dizziness, tremors, syncope, numbness and headaches.   Psychiatric/Behavioral: Negative for behavioral problems and confusion.         Objective:       Vitals:    04/04/22 1521   BP: (!) 98/47   Pulse: 72   Resp: 16   Temp: 98.6 °F (37 °C)     Physical Exam  Vitals and nursing note reviewed.   Constitutional:       Appearance: She is  well-developed.   HENT:      Head: Normocephalic and atraumatic.      Right Ear: External ear normal.      Left Ear: External ear normal.   Eyes:      Conjunctiva/sclera: Conjunctivae normal.   Cardiovascular:      Rate and Rhythm: Normal rate and regular rhythm.      Heart sounds: Normal heart sounds. No murmur heard.  Pulmonary:      Effort: Pulmonary effort is normal. No respiratory distress.      Breath sounds: Normal breath sounds. No stridor. No wheezing or rales.   Abdominal:      General: Bowel sounds are normal. There is no distension.      Palpations: Abdomen is soft.      Tenderness: There is no abdominal tenderness.   Musculoskeletal:         General: Normal range of motion.      Cervical back: Normal range of motion.   Skin:     General: Skin is warm and dry.      Findings: No rash.   Neurological:      Mental Status: She is alert and oriented to person, place, and time.   Psychiatric:         Behavior: Behavior normal.         Thought Content: Thought content normal.         Judgment: Judgment normal.             Assessment:       No diagnosis found.    Plan:     MDS  - Primary oncologist, Dr. 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 Developed allergy and was then desensitized. Soon after developed pancytopenia and Revlimid held since June 2017.    - BMBX on 6/8/17 was with normal cytogenetics. 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 Discussed treatment options of HMA therapy or repeat trial of Revlimid and patient wished to proceed with Revlimid   - Revlimid stopped again due to repeat allergic reaction and pancytopenia 3/2019  - Started cycle 1 Vidaza 5/6/19 subq for 5 days every 28 days  - Restaging bone marrow biopsy following cycle 5 from 10/24/19 shows persistent MDS, no excess blasts and 3 of 20 metaphases with 5q deletion  - Tolerated previous cycles well. Package insert for Vidaza recommends holding if ANC <1000, however pt has been receiving this  with an ANC below 1000 for each cycle. Okay to continue to give despite neutropenia per Dr. Valdes  - Restaging marrow on 04/21/21 with persistent MDS with isolated del 5Q. Of 20 metaphases, 5 were normal and 15 had a 5q deletion, NGS positive for SF3B1 and TP53 (12%). 1.4% blasts  - Received vidaza for 22 cycles, received Decitabine for C23 due to national shortage of vidaza and then back to vidaza with C24.  - Ok to proceed with cycle 39 today  - Tolerating well, takes zofran PRN for nausea    Cytopenias 2/2 disease: anemia and neutropenia  - Transfuse for hgb < 7  - Continue ppx acyclovir, cipro, fluconazole (only taking 200mg due to previously elevated LFTs)  - discussed stopping ppx antibiotic and antifungal given ANC >500 for at least past 2 months. Patient wishes to continue these for now  - Transfusion dependent, receives twice weekly labs, has chair on hold for transfusions  - no transfusions needed today    Abnormal CT chest  - presented to the ER 8/27 for chest pain  - No cardiac etiology identified  - improved after blood transfusion  - CTA of chest performed shows 2cm RUL spiculated mass suspicious for a primary lung malignancy in a prior smoker  - Lung biopsy in IR was scheduled on 11/3. Per IR lung nodules almost resolved, cancelled bx.  Repeat CT chest scheduled for 2/17/22    DM2  - Management per PCP  - Continue metformin  - Blood glucoses normal.    HTN  - Management per cardiologist   - Continue lisinopril-HCTZ and coreg  - bp wnl    CAD  - S/P left carotid endarterectomy prior to cancer dx  - Continue Repatha for HLD per PCP (intolerant to statins)  - Continue on ASA 81mg daily, platelets are wnl  - Seen by vascular surgery, Dr. Murguia. No intervention needed as asymptomatic    Adjustment disorder  - Improved mood on Celexa  - previously followed by psych onc     Myalgias/possible sciatica/Neuropathy  - Started trial of gabapentin 100 mg bid 11/4/19 with improvement  - taking Tylenol PRN for pain,  encouraged to use cautiously as to not mask potential fevers  - Return to [facility] for water aerobics once able, patient reports this has been difficult for her to achieve  - will refer to PT here at [facility]    Follow up:  - Schedule labs twice weekly (CBC, type and screen; add weekly CMP) Monday and Thursday  - Schedule infusion chair Tuesday and Friday for possible transfusions  - Schedule return visit with CBC, CMP, T&S, and appt with Dr. Valdes on 5/2/22  - Schedule chemo chair for next cycle of vidaza from 5/2-5/6

## 2022-04-08 NOTE — PLAN OF CARE
Patient tolerated Vidaza and 1 unit PRBC with no complications. VSS. Pt instructed to call MD with any problems. NAD. Pt discharged home independently.

## 2022-04-19 NOTE — PLAN OF CARE
1445-Patient tolerated 1 unit PRBCs well. Discharged without complaints or S/S of adverse event.  Port was flushed and heparin locked, then de-accessed.  Instructed to call provider for any questions or concerns. Verbalized understanding.

## 2022-04-19 NOTE — PLAN OF CARE
1230-Labs , hx, and medications reviewed. Assessment completed. Discussed plan of care with patient. Patient in agreement. Chair reclined and warm blanket and snack offered.

## 2022-04-22 NOTE — PLAN OF CARE
Pt tolerated 1 unit PRBCS and 1L IVFs  today. NAD. Port flushed + blood return present, flushed. Hep lock and deaccesed. declined AVS. Uses my [facility]. Discharged home. Ambulated independently to w/c, transport escorted pt to parking lot to ride.   Problem: Adult Inpatient Plan of Care  Goal: Optimal Comfort and Wellbeing  Intervention: Provide Person-Centered Care  Flowsheets (Taken 4/22/2022 1574)  Trust Relationship/Rapport:   choices provided   thoughts/feelings acknowledged   care explained   emotional support provided   empathic listening provided   questions answered   questions encouraged   reassurance provided

## 2022-04-23 NOTE — ED PROVIDER NOTES
Encounter Date: 4/23/2022       History     Chief Complaint   Patient presents with    Cough     +productive-- leukemia pt, last chemo earlier this month--chest wall pain and SOB      Susan Puente is a 71 year old woman with history of Anemia, CAD, T2DM, MDS currently receiving Vidaza and Dacogen presenting to the ED with 2 weeks of productive cough and R sided pleuritic chest pain associated with fever, chills, myalgias and loss of appetite. She denies nausea, abdominal pain, constipation, diarrhea, syncope, or gait problems. She is unvaccinated to COVID and Flu. She was recently discharged from BMT service on 2/16 for syncopal event at infusion clinic. Family history of heart disease. History was taken with input from patient and previous admissions.         Review of patient's allergies indicates:   Allergen Reactions    Revlimid [lenalidomide] Swelling     Facial swelling  6/8/17 - in the process of desensitation     Past Medical History:   Diagnosis Date    Allergic rhinitis     Allergy     Anemia     Anxiety     CAD (coronary artery disease)     Carotid stenosis     Colon polyps 2016    Controlled type 2 diabetes mellitus without complication, without long-term current use of insulin 10/19/2016    Depression     GERD (gastroesophageal reflux disease)     Hearing loss in right ear      of psychiatric care     Celexa, Prozac    MDS (myelodysplastic syndrome) with 5q deletion     Psychiatric problem     Therapy      Past Surgical History:   Procedure Laterality Date    BONE MARROW BIOPSY Left 6/7/2018    Procedure: Biopsy-bone marrow;  Surgeon: Gita Valdes MD;  Location: [facility] OR [address] [place] [address];  Service: Oncology;  Laterality: Left;    BONE MARROW BIOPSY Left 3/21/2019    Procedure: Biopsy-bone marrow;  Surgeon: Gita Valdes MD;  Location: [facility] OR [address] [place] [address];  Service: Oncology;  Laterality: Left;    BONE MARROW BIOPSY Left 10/24/2019    Procedure: Biopsy-bone marrow;  Surgeon: Gita Valdes MD;   [facility];  Service: Oncology;  Laterality: Left;  left iliac crest    BONE MARROW BIOPSY Left 2021    Procedure: Biopsy-bone marrow;  Surgeon: Gita Valdes MD;  Location: [facility] ENDO (4TH FLR);  Service: Oncology;  Laterality: Left;    BUNIONECTOMY      CAROTID ENDARTERECTOMY Left     CAROTID STENT      COLONOSCOPY N/A 2016    Procedure: COLONOSCOPY;  Surgeon: PATRICIA Simpson MD;  Location: [facility] ENDO (4TH FLR);  Service: Endoscopy;  Laterality: N/A;  pt has vomiting with anesthesia in past    ENDOMETRIAL ABLATION      for endometriosis    INSERTION OF TUNNELED CENTRAL VENOUS CATHETER (CVC) WITH SUBCUTANEOUS PORT N/A 2020    Procedure: [id number];  Surgeon: Dosserena Diagnostic Provider;  Location: [facility] OR [address] [place] [address];  Service: Radiology;  Laterality: N/A;  189    TONSILLECTOMY      TYMPANOSTOMY TUBE PLACEMENT       Family History   Problem Relation Age of Onset    Heart disease Mother     Hyperlipidemia Mother     Heart disease Father     Alcohol abuse Father     Cancer Paternal Grandmother         smoker; lung?    Alcohol abuse Brother         recovering    Colon cancer Neg Hx     Breast cancer Neg Hx      Social History     Tobacco Use    Smoking status: Former Smoker     Packs/day: 0.50     Years: 36.00     Pack years: 18.00     Types: Cigarettes, Vaping with nicotine     Quit date: 3/3/2017     Years since quittin.1    Smokeless tobacco: Never Used   Substance Use Topics    Alcohol use: No    Drug use: No     Review of Systems   Constitutional: Positive for activity change, appetite change and chills. Negative for fever.   HENT: Positive for congestion and rhinorrhea. Negative for postnasal drip.    Eyes: Negative for discharge and itching.   Respiratory: Positive for cough. Negative for shortness of breath and wheezing.    Cardiovascular: Negative for chest pain and palpitations.   Gastrointestinal: Positive for nausea and vomiting. Negative for  abdominal distention, abdominal pain, constipation and diarrhea.   Genitourinary: Positive for decreased urine volume. Negative for flank pain and hematuria.   Musculoskeletal: Negative for back pain and myalgias.   Neurological: Positive for headaches. Negative for dizziness.   Psychiatric/Behavioral: Negative for agitation and behavioral problems.       Physical Exam     Initial Vitals [04/23/22 1510]   BP Pulse Resp Temp SpO2   (!) 161/73 92 18 99.9 °F (37.7 °C) 98 %      MAP       --         Physical Exam    Nursing note and vitals reviewed.  Constitutional: She appears well-developed and well-nourished. She is not diaphoretic.   HENT:   Head: Normocephalic and atraumatic.   Moist mucous membranes   Eyes: EOM are normal. Pupils are equal, round, and reactive to light.   Neck: Neck supple.   Normal range of motion.  Cardiovascular: Normal rate and regular rhythm.   Pulmonary/Chest: Breath sounds normal. No respiratory distress.   On 2L NC   Abdominal: Abdomen is soft. Bowel sounds are normal.   Musculoskeletal:         General: No tenderness or edema. Normal range of motion.      Cervical back: Normal range of motion and neck supple.     Neurological: She is alert and oriented to person, place, and time.   Skin: Skin is warm and dry.   Psychiatric: She has a normal mood and affect. Thought content normal.         ED Course   Procedures  Labs Reviewed   CBC W/ AUTO DIFFERENTIAL - Abnormal; Notable for the following components:       Result Value    WBC 2.94 (*)     RBC 2.42 (*)     Hemoglobin 7.4 (*)     Hematocrit 21.9 (*)     RDW 15.8 (*)     Platelets 83 (*)     Gran % 78.0 (*)     Lymph % 17.0 (*)     Mono % 2.0 (*)     Platelet Estimate Decreased (*)     All other components within normal limits   COMPREHENSIVE METABOLIC PANEL - Abnormal; Notable for the following components:    Sodium 133 (*)     Albumin 2.7 (*)     All other components within normal limits   URINALYSIS, REFLEX TO URINE CULTURE - Abnormal;  Notable for the following components:    Appearance, UA Hazy (*)     Protein, UA 1+ (*)     Occult Blood UA 1+ (*)     All other components within normal limits    Narrative:     Specimen Source->Urine   URINALYSIS MICROSCOPIC - Abnormal; Notable for the following components:    Hyaline Casts, UA 2 (*)     All other components within normal limits    Narrative:     Specimen Source->Urine   CULTURE, RESPIRATORY   SARS-COV-2 RDRP GENE    Narrative:     This test utilizes isothermal nucleic acid amplification   technology to detect the SARS-CoV-2 RdRp nucleic acid segment.   The analytical sensitivity (limit of detection) is 125 genome   equivalents/mL.   A POSITIVE result implies infection with the SARS-CoV-2 virus;   the patient is presumed to be contagious.     A NEGATIVE result means that SARS-CoV-2 nucleic acids are not   present above the limit of detection. A NEGATIVE result should be   treated as presumptive. It does not rule out the possibility of   COVID-19 and should not be the sole basis for treatment decisions.   If COVID-19 is strongly suspected based on clinical and exposure   history, re-testing using an alternate molecular assay should be   considered.   This test is only for use under the Food and Drug   Administration s Emergency Use Authorization (EUA).   Commercial kits are provided by [employer].   Performance characteristics of the EUA have been independently   verified by [facility] Department of   Pathology and Laboratory Medicine.   _________________________________________________________________   The authorized Fact Sheet for Healthcare Providers and the authorized Fact   Sheet for Patients of the ID NOW COVID-19 are available on the FDA   website:     https://www.fda.gov/media/802443/download  https://www.fda.gov/media/192136/download           POCT INFLUENZA A/B MOLECULAR    Narrative:     This test utilizes isothermal nucleic acid amplification   technology to detect  the SARS-CoV-2 RdRp nucleic acid segment.   The analytical sensitivity (limit of detection) is 125 genome   equivalents/mL.   A POSITIVE result implies infection with the SARS-CoV-2 virus;   the patient is presumed to be contagious.     A NEGATIVE result means that SARS-CoV-2 nucleic acids are not   present above the limit of detection. A NEGATIVE result should be   treated as presumptive. It does not rule out the possibility of   COVID-19 and should not be the sole basis for treatment decisions.   If COVID-19 is strongly suspected based on clinical and exposure   history, re-testing using an alternate molecular assay should be   considered.   This test is only for use under the Food and Drug   Administration s Emergency Use Authorization (EUA).   Commercial kits are provided by [employer]   Performance characteristics of the EUA have been independently   verified by [facility] Department of   Pathology and Laboratory Medicine.   _________________________________________________________________   The authorized Fact Sheet for Healthcare Providers and the authorized Fact   Sheet for Patients of the ID NOW COVID-19 are available on the FDA   website:     https://www.fda.gov/media/571281/download  https://www.fda.gov/media/238991/download             EKG Readings: (Independently Interpreted)   Initial Reading: No STEMI. Rhythm: Normal Sinus Rhythm. Heart Rate: 72.       Imaging Results          X-Ray Chest 1 View (Final result)  Result time 04/23/22 16:46:00    Final result by Joy Horn MD (04/23/22 16:46:00)                 Impression:      No significant interval change since prior exam noted.      Electronically signed by: Joy Horn  Date:    04/23/2022  Time:    16:46             Narrative:    EXAMINATION:  XR CHEST 1 VIEW    CLINICAL HISTORY:  shortness of breath;    TECHNIQUE:  Single frontal view of the chest was performed.    COMPARISON:  02/14/2022    FINDINGS:  Right chest wall  port tip at cavoatrial junction.  There is mild pulmonary venous congestion.  The heart size appears normal.  There is mild unfolding and atherosclerotic stigmata of the aorta.    There are postsurgical changes in the left neck.                              X-Rays:   Independently Interpreted Readings:   Chest X-Ray: Normal heart size. There is an infiltrate in the RML.     Medications   cefTRIAXone (ROCEPHIN) 2 g/50 mL D5W IVPB (has no administration in time range)   sodium chloride 0.9% flush 10 mL (has no administration in time range)   acyclovir capsule 400 mg (400 mg Oral Given 4/23/22 2046)   aspirin EC tablet 81 mg (has no administration in time range)   carvediloL tablet 12.5 mg (12.5 mg Oral Given 4/23/22 2046)   citalopram tablet 20 mg (has no administration in time range)   fluconazole tablet 400 mg (has no administration in time range)   sucralfate 100 mg/mL suspension 1 g (has no administration in time range)   aluminum-magnesium hydroxide-simethicone 200-200-20 mg/5 mL suspension 30 mL (30 mLs Oral Given 4/23/22 2050)   azithromycin 500 mg in dextrose 5 % 250 mL IVPB (ready to mix system) (has no administration in time range)   cefTRIAXone (ROCEPHIN) 2 g/50 mL D5W IVPB (has no administration in time range)   azithromycin 500 mg in dextrose 5 % 250 mL IVPB (ready to mix system) (0 mg Intravenous Stopped 4/23/22 2046)     Medical Decision Making:   Initial Assessment:   Susan Puente is a 71 year old woman with history of Anemia, CAD, T2DM, MDS currently receiving Vidaza and Dacogen presenting to the ED with 2 weeks of productive cough and R sided pleuritic chest pain associated with fever, chills, myalgias and loss of appetite.  Differential Diagnosis:   PNA  PE  Bronchitis  Clinical Tests:   Lab Tests: Ordered and Reviewed  Radiological Study: Ordered and Reviewed  Medical Tests: Ordered and Reviewed  ED Management:  In the ED, she was seen and evaluated by ED Team Gold. CXR with worsening R  opacifications compared to prior CXR. She required 2L of O2 which is off her baseline. SpCx ordered. She was given ceftriaxone, azithro x1.  She was admitted to BMT service for possible CAP PNA in the setting of active chemotherapy.                      Clinical Impression:   Final diagnoses:  [R06.02] Shortness of breath  [R06.00] Dyspnea  [J18.9] Pneumonia of right middle lobe due to infectious organism (Primary)  [R05.9] Cough          ED Disposition Condition    Admit               Juarez Mantilla MD  Resident  04/23/22 2054

## 2022-04-23 NOTE — ED TRIAGE NOTES
"Patient brought in by EMS with complaints of loss of appetite, dry heaves, and gas for about 3 weeks. Lots of mucus. Patient states," The mucus is like forming a barrier at the base of my throat. Sharp pain that prevent her from taking a deep breathe and holding it. Pain starts under right breast at the top right under her rib cage. When pain does occur patient rates it 7/10.  "

## 2022-04-23 NOTE — HPI
Pt is a 71 year old woman with history of MDS curerntly on Vidaza and Decogen, CAD, T2DM presents to the hospital with 2 weeks of productive cough and R sided chest pain associated with fever, chills, myalgias and loss of appetite. She is unvaccinated to COVID and Flu.     On arrival to the hospital she was afebrile, HR 92, /73, and saturating 98% on 3L NC. Labs with pancytopenia with wbc 2.94, hgb 7.4 and plt 83 (stable from prior). cmp unremarkable. CXR with mild pulm venous congestion in addition to likely infilrate in LL lobe.  Placed on rocephin and azithro at the ED and admitted to the BMT service for CAP.       Oncology History (per prior notes from Dr. Valdes)  71 year old female with hx of DM2, peripheral vascular disease, tobacco use, CAD, hyperlipidemia, hypertension who was hospitalized 11/10/16 for anemia. hgb 5.3  MCH 43.4 with normal WBC and platelets. Patient had normal iron stores. She was transfused 3 units of PRBCs and discharged home with hgb 8.7 on 11/11/16. She was referred for further evalutation of her anemia. On 12/16/16 patient had a normal SPEP and immunofixation. Slightly elevated kappa light chains with normal ration. Elevated vitamin b12, normal folate, JAYDEN was positive with a low titer and negative profile. Patient had a bone marrow biopsy 1/5/16 which showed the core biopsy is normocellular for age (40%); however, megakaryocytes are increased and show frequent small, hypolobated forms. Additionally, a subset of the neutrophils are hypogranular. Blasts are not increased by either morphology (1.2%) or in the corresponding flow cytometric analysis. Fish detects a 5q deletion in 54.5% of nuclei. Cytogenetics reported 20 metaphases, 2 metaphases were normal and 18 metaphases had a 5q deletion. No additional cytogenetic abnormalities were detected. Findings consistent with 5q deletion syndrome.     Patient had a delay in obtaining Revlimid 10 mg daily but did start taking the  medication 2/8/17. On 2/9/17 patient developed a diffuse maculopapular rash throughout scalp arms, legs and torso. She states she had no stridor or wheezing. She discontinued medication 2/15/17. Went to allergist who provided references on desensitization so that patient could resume Revlimid. Unfortunately developed pancytopenia and Revlimid stopped 6/16/17. She had a repeat BMBX  performed 6/8/17 and showed a hypercellular marrow (60%) continued atypia in the granulocytes and megakaryocytes were noted.  Additionally, there is erythroid atypia present. No increase in blasts. Cytogenetics are normal and MDS FISH is negative, failing to show 5 q minus. NGS should no significant molecular mutations.  Anemia work up revealed bienvenido negative hemolysis.     Revlimid has been stopped since June 2017 after she developed pancytopenia while on Revlimid (required desensitization). CBC was normal for almost 1 year and marrow was negative for del5q. Bone marrow biopsy repeat from 6/2018 showed relapsed 5q minus MDS without new or additional mutations. Revlimid restarted at 2.5 mg daily with prednisone to prevent allergic reaction. Titrated to a goal of 10mg daily Revlimid. Hospital admission 3/12-3/17/19 for unresponsive event after blood transfusion, found to be profoundly pancytopenic at admission. Since hospital admission Revlimid has been stopped. Repeat marrow March 2019 shows persistent MDS with 5q minus.      Started cycle 1 Vidaza 5/6/19 subq for 5 days every 28 days. Restaging bone marrow biopsy from 10/24/19 shows persistent MDS, no excess blasts and 3 of 20 metaphases with 5q deletion.

## 2022-04-24 PROBLEM — J18.9 CAP (COMMUNITY ACQUIRED PNEUMONIA): Status: ACTIVE | Noted: 2022-01-01

## 2022-04-24 PROBLEM — E44.0 MODERATE MALNUTRITION: Status: ACTIVE | Noted: 2022-01-01

## 2022-04-24 NOTE — ED NOTES
Received report from day shift RN. They are waiting for the Rocephin to come from Pharmacy. Patient resting and comfortable at this time. Will continue to monitor.

## 2022-04-24 NOTE — ED NOTES
Spoke with receiving RN on Oncology floor about overdue Rocephin order. Explained there was a miscommunication between outgoing RN when I received patient, as they said they were waiting for Pharmacy to deliver the Rocephin, which did not come.  Messaged the admission team to discuss whether Rocephin should be given now (by Oncology RN) or re-dosed. Team advised to give Rocephin now and document reason why it was administered late. Called the Oncology RN to deliver message.

## 2022-04-24 NOTE — ASSESSMENT & PLAN NOTE
Nutrition Problem:  Moderate Protein-Calorie Malnutrition  Malnutrition in the context of Acute Illness/Injury    Related to (etiology):  Inability to consume sufficient energy    Signs and Symptoms (as evidenced by):  Energy Intake: <75% of estimated energy requirement for 3 weeks  Body Fat Depletion: mild and moderate depletion of orbitals and triceps   Muscle Mass Depletion: mild and moderate depletion of temples, clavicle region, scapular region and lower extremities   Weight Loss: 5% x 1 month    Interventions(treatment strategy):  Collaboration of nutrition care w/ other providers  ONS    Nutrition Diagnosis Status:  New

## 2022-04-24 NOTE — H&P
[facility] - Oncology ([facility])  Hematology  Bone Marrow Transplant  H&P    Subjective:     Principal Problem: CAP (community acquired pneumonia)    HPI: Pt is a 71 year old woman with history of MDS curerntly on Vidaza and Decogen, CAD, T2DM presents to the hospital with 2 weeks of productive cough and R sided chest pain. Patient states that sxs initiated with nasal congestions which persisted and cont to worsen. A week ago she developed congestion like feeling in chest associated with cough productive of yellow sputum, n/v. Has vomited atleast once a day for the past week. Denies any associated sob/maxwell, palpitations, fever, chills.   Denies any recent sick contact.     Pt is a former tobacco smoker. Quit smoking 3 yrs ago. Denies alcohol use or illicit drug use. Lives by herself and her dogs and independently performs her ADLs and IADLs.     On arrival to the hospital she was afebrile, HR 92, /73, and saturating 98% on 3L NC. Labs with pancytopenia with wbc 2.94, hgb 7.4 and plt 83 (stable from prior). cmp unremarkable. Trop 0.014. EKG w/o new ST-T changes. CXR with mild pulm venous congestion in addition to likely infilrate in LL lobe.  Placed on rocephin and azithro at the ED and admitted to the BMT service for CAP.         Oncology History (per prior notes from Dr. Valdes)  71 year old female with hx of DM2, peripheral vascular disease, tobacco use, CAD, hyperlipidemia, hypertension who was hospitalized 11/10/16 for anemia. hgb 5.3  MCH 43.4 with normal WBC and platelets. Patient had normal iron stores. She was transfused 3 units of PRBCs and discharged home with hgb 8.7 on 11/11/16. She was referred for further evalutation of her anemia. On 12/16/16 patient had a normal SPEP and immunofixation. Slightly elevated kappa light chains with normal ration. Elevated vitamin b12, normal folate, JAYDEN was positive with a low titer and negative profile. Patient had a bone marrow biopsy 1/5/16 which showed the  core biopsy is normocellular for age (40%); however, megakaryocytes are increased and show frequent small, hypolobated forms. Additionally, a subset of the neutrophils are hypogranular. Blasts are not increased by either morphology (1.2%) or in the corresponding flow cytometric analysis. Fish detects a 5q deletion in 54.5% of nuclei. Cytogenetics reported 20 metaphases, 2 metaphases were normal and 18 metaphases had a 5q deletion. No additional cytogenetic abnormalities were detected. Findings consistent with 5q deletion syndrome.     Patient had a delay in obtaining Revlimid 10 mg daily but did start taking the medication 2/8/17. On 2/9/17 patient developed a diffuse maculopapular rash throughout scalp arms, legs and torso. She states she had no stridor or wheezing. She discontinued medication 2/15/17. Went to allergist who provided references on desensitization so that patient could resume Revlimid. Unfortunately developed pancytopenia and Revlimid stopped 6/16/17. She had a repeat BMBX  performed 6/8/17 and showed a hypercellular marrow (60%) continued atypia in the granulocytes and megakaryocytes were noted.  Additionally, there is erythroid atypia present. No increase in blasts. Cytogenetics are normal and MDS FISH is negative, failing to show 5 q minus. NGS should no significant molecular mutations.  Anemia work up revealed bienvenido negative hemolysis.     Revlimid has been stopped since June 2017 after she developed pancytopenia while on Revlimid (required desensitization). CBC was normal for almost 1 year and marrow was negative for del5q. Bone marrow biopsy repeat from 6/2018 showed relapsed 5q minus MDS without new or additional mutations. Revlimid restarted at 2.5 mg daily with prednisone to prevent allergic reaction. Titrated to a goal of 10mg daily Revlimid. Hospital admission 3/12-3/17/19 for unresponsive event after blood transfusion, found to be profoundly pancytopenic at admission. Since hospital  admission Revlimid has been stopped. Repeat marrow March 2019 shows persistent MDS with 5q minus.      Started cycle 1 Vidaza 5/6/19 subq for 5 days every 28 days. Restaging bone marrow biopsy from 10/24/19 shows persistent MDS, no excess blasts and 3 of 20 metaphases with 5q deletion.         Patient information was obtained from patient, past medical records, and ER records.     Oncology History:      Medications Prior to Admission   Medication Sig Dispense Refill Last Dose    acetaminophen (TYLENOL) 650 MG TbSR Take 650 mg by mouth every 8 (eight) hours as needed (for pain).       acyclovir (ZOVIRAX) 400 MG tablet Take 1 tablet (400 mg total) by mouth 2 (two) times daily. 60 tablet 6     aspirin (ECOTRIN) 81 MG EC tablet Take 1 tablet (81 mg total) by mouth once daily. 30 tablet 11     azacitidine (VIDAZA INJ) Inject as directed every 28 days.       azaCITIDine (VIDAZA) 100 mg SolR chemo injection        carvediloL (COREG) 12.5 MG tablet Take 1 tablet (12.5 mg total) by mouth 2 (two) times daily with meals. 120 tablet 0     ciprofloxacin HCl (CIPRO) 500 MG tablet Take 1 tablet (500 mg total) by mouth 2 (two) times daily. 60 tablet 5     citalopram (CELEXA) 20 MG tablet Take 1 tablet (20 mg total) by mouth once daily. 30 tablet 2     evolocumab (REPATHA SURECLICK) 140 mg/mL PnIj Inject 140mg (1 pen) into the skin every 2 weeks. (Patient taking differently: Inject 140mg (1 pen) into the skin every 2 weeks.) 6 mL 3     fluconazole (DIFLUCAN) 200 MG Tab Take 2 tablets (400 mg total) by mouth once daily. 60 tablet 6     lancets 30 gauge Misc Use to test blood sugar daily (Patient taking differently: Use to test blood sugar daily) 100 each 3     lisinopriL-hydrochlorothiazide (PRINZIDE,ZESTORETIC) 20-12.5 mg per tablet Take 2 tablets by mouth once daily. 180 tablet 3     loratadine (CLARITIN) 10 mg tablet Take 10 mg by mouth daily as needed.        loratadine (CLARITIN) 10 mg tablet 1 tablet AS NEEDED  (route: oral)       magnesium 30 mg Tab Take by mouth once.       magnesium oxide (MAGOX) 400 mg (241.3 mg magnesium) tablet Take 1 tablet (400 mg total) by mouth once daily. 30 tablet 11     melatonin 10 mg Tab Take 1 tablet (10 mg total) by mouth once daily.       metFORMIN (GLUCOPHAGE-XR) 500 MG ER 24hr tablet Take 1 tablet (500 mg total) by mouth daily with breakfast. (Patient not taking: Reported on 4/4/2022) 90 tablet 0     MULTIVITAMIN ORAL Take by mouth.       pantoprazole (PROTONIX) 40 MG tablet Take 1 tablet (40 mg total) by mouth once daily. 30 tablet 3     potassium chloride SA (K-DUR,KLOR-CON) 20 MEQ tablet Take 1 tablet (20 mEq total) by mouth once daily. 30 tablet 4        Revlimid [lenalidomide]     Past Medical History:   Diagnosis Date    Allergic rhinitis     Allergy     Anemia     Anxiety     CAD (coronary artery disease)     Carotid stenosis     Colon polyps 2016    Controlled type 2 diabetes mellitus without complication, without long-term current use of insulin 10/19/2016    Depression     GERD (gastroesophageal reflux disease)     Hearing loss in right ear      of psychiatric care     Celexa, Prozac    MDS (myelodysplastic syndrome) with 5q deletion     Psychiatric problem     Therapy      Past Surgical History:   Procedure Laterality Date    BONE MARROW BIOPSY Left 6/7/2018    Procedure: Biopsy-bone marrow;  Surgeon: Gita Valdes MD;  Location: [address];  Service: Oncology;  Laterality: Left;    BONE MARROW BIOPSY Left 3/21/2019    Procedure: Biopsy-bone marrow;  Surgeon: Gita Valdes MD;  Location: [address];  Service: Oncology;  Laterality: Left;    BONE MARROW BIOPSY Left 10/24/2019    Procedure: Biopsy-bone marrow;  Surgeon: Gita Valdes MD;  Location: [address];  Service: Oncology;  Laterality: Left;  left iliac crest    BONE MARROW BIOPSY Left 4/21/2021    Procedure: Biopsy-bone marrow;  Surgeon: Gita Valdes MD;  Location: [address]  [facility]);  Service: Oncology;  Laterality: Left;    BUNIONECTOMY      CAROTID ENDARTERECTOMY Left     CAROTID STENT      COLONOSCOPY N/A 2016    Procedure: COLONOSCOPY;  Surgeon: PATRICIA Simpson MD;  Location: [facility] (4TH FLR);  Service: Endoscopy;  Laterality: N/A;  pt has vomiting with anesthesia in past    ENDOMETRIAL ABLATION      for endometriosis    INSERTION OF TUNNELED CENTRAL VENOUS CATHETER (CVC) WITH SUBCUTANEOUS PORT N/A 2020    Procedure: [id number];  Surgeon: Dosserena Diagnostic Provider;  Location: [facility] OR 2ND FLR;  Service: Radiology;  Laterality: N/A;  189    TONSILLECTOMY      TYMPANOSTOMY TUBE PLACEMENT       Family History       Problem Relation (Age of Onset)    Alcohol abuse Father, Brother    Cancer Paternal Grandmother    Heart disease Mother, Father    Hyperlipidemia Mother          Tobacco Use    Smoking status: Former Smoker     Packs/day: 0.50     Years: 36.00     Pack years: 18.00     Types: Cigarettes, Vaping with nicotine     Quit date: 3/3/2017     Years since quittin.1    Smokeless tobacco: Never Used   Substance and Sexual Activity    Alcohol use: No    Drug use: No    Sexual activity: Not on file       Review of Systems   Constitutional:  Positive for activity change. Negative for chills, diaphoresis and fever.   HENT:  Positive for sinus pressure. Negative for hearing loss, sore throat and trouble swallowing.    Eyes:  Negative for photophobia and visual disturbance.   Respiratory:  Positive for cough and chest tightness. Negative for apnea and shortness of breath.    Cardiovascular:  Negative for chest pain and palpitations.   Gastrointestinal:  Positive for nausea and vomiting. Negative for abdominal distention and abdominal pain.   Endocrine: Negative for polyphagia and polyuria.   Genitourinary:  Negative for dysuria, flank pain, frequency and hematuria.   Musculoskeletal:  Negative for arthralgias, back pain and myalgias.   Skin:   Negative for color change, pallor and rash.   Allergic/Immunologic: Negative for immunocompromised state.   Neurological:  Negative for dizziness, speech difficulty, weakness, numbness and headaches.   Hematological:  Negative for adenopathy.   Psychiatric/Behavioral:  Negative for agitation and behavioral problems.    All other systems reviewed and are negative.  Objective:     Vital Signs (Most Recent):  Temp: 98.6 °F (37 °C) (04/23/22 2322)  Pulse: 88 (04/23/22 2322)  Resp: 18 (04/23/22 2322)  BP: (!) 157/70 (04/23/22 2322)  SpO2: 96 % (04/23/22 2322)   Vital Signs (24h Range):  Temp:  [98.6 °F (37 °C)-100.2 °F (37.9 °C)] 98.6 °F (37 °C)  Pulse:  [85-94] 88  Resp:  [18] 18  SpO2:  [95 %-98 %] 96 %  BP: (141-169)/(63-73) 157/70     Weight: 70.3 kg (155 lb)  Body mass index is 25.4 kg/m².  Body surface area is 1.8 meters squared.    ECOG SCORE           [unfilled]    Lines/Drains/Airways       Central Venous Catheter Line  Duration             Tunneled Central Line Insertion/Assessment - Double Lumen  02/19/20 1319 right internal jugular 794 days    Port A Cath Single Lumen 02/14/22 1615 68 days              Peripheral Intravenous Line  Duration                  Peripheral IV - Single Lumen 04/23/22 0000 20 G Left Antecubital 1 day                    Physical Exam  Vitals and nursing note reviewed.   Constitutional:       General: She is not in acute distress.     Appearance: Normal appearance. She is well-developed. She is not diaphoretic.   HENT:      Head: Normocephalic and atraumatic.   Eyes:      General: No scleral icterus.     Conjunctiva/sclera: Conjunctivae normal.   Neck:      Vascular: No JVD.      Trachea: No tracheal deviation.   Cardiovascular:      Rate and Rhythm: Normal rate and regular rhythm.      Heart sounds:     No friction rub. No gallop.   Pulmonary:      Effort: Pulmonary effort is normal. No respiratory distress.      Breath sounds: Normal breath sounds. No wheezing.      Comments: Course  breath sounds jonh lung fields  Abdominal:      General: Bowel sounds are normal. There is no distension.      Palpations: Abdomen is soft. There is no mass.   Musculoskeletal:         General: No deformity. Normal range of motion.      Cervical back: Normal range of motion and neck supple.   Lymphadenopathy:      Cervical: No cervical adenopathy.   Skin:     General: Skin is warm and dry.      Coloration: Skin is not pale.   Neurological:      Mental Status: She is alert.       Significant Labs:   All pertinent labs from the last 24 hours have been reviewed.    Diagnostic Results:  I have reviewed all pertinent imaging results/findings within the past 24 hours.  I have reviewed and interpreted all pertinent imaging results/findings within the past 24 hours.    Assessment/Plan:     * CAP (community acquired pneumonia)  Pt with 2 weeks of productive cough, nasal congestion  Upon arrival to the hospial she was hypoxic requiring 3L NC  CXR with likely develop consolidation  Will cont Rocephin and Azithro for CAP coverage (qtc stable)    Controlled type 2 diabetes mellitus without complication, without long-term current use of insulin  Will hold home po diabetic medications  SSI  Goal bg 140-180    MDS (myelodysplastic syndrome)  --MDS with 5q deletion, currently on Revelimid 10 mg daily   - Revelimid currently held 2/2 pancytopenia    Adjustment disorder with mixed anxiety and depressed mood  Cont celexa    Essential hypertension  Home medications include lisinopril/hctz and coreg  Coreg initiated  Will cont to monitor vitals  If BP cont to be elv add home lisinopril/hctz    Anemia requiring transfusions  2/2 MDS  Will cont to monitor hgb  Will transfuse for hgb <7    CAD (coronary artery disease)  - S/P left carotid endarterectomy prior to cancer dx, s/p PCI (3 stents) >20 years ago   - Continue on ASA 81mg daily, platelets are wnl  - recommend initiation of statin        VTE Risk Mitigation (From admission, onward)          Ordered     IP VTE HIGH RISK PATIENT  Once         04/23/22 1844     Place sequential compression device  Until discontinued         04/23/22 1844                Disposition:     Harshal Martinez MD  Bone Marrow Transplant  Hematology  [facility] [address] - Oncology ([facility])

## 2022-04-24 NOTE — ASSESSMENT & PLAN NOTE
Home medications include lisinopril/hctz and coreg  Coreg initiated  Will cont to monitor vitals  If BP cont to be elv add home lisinopril/hctz

## 2022-04-24 NOTE — SUBJECTIVE & OBJECTIVE
Patient information was obtained from patient, past medical records, and ER records.     Oncology History:      Medications Prior to Admission   Medication Sig Dispense Refill Last Dose    acetaminophen (TYLENOL) 650 MG TbSR Take 650 mg by mouth every 8 (eight) hours as needed (for pain).       acyclovir (ZOVIRAX) 400 MG tablet Take 1 tablet (400 mg total) by mouth 2 (two) times daily. 60 tablet 6     aspirin (ECOTRIN) 81 MG EC tablet Take 1 tablet (81 mg total) by mouth once daily. 30 tablet 11     azacitidine (VIDAZA INJ) Inject as directed every 28 days.       azaCITIDine (VIDAZA) 100 mg SolR chemo injection        carvediloL (COREG) 12.5 MG tablet Take 1 tablet (12.5 mg total) by mouth 2 (two) times daily with meals. 120 tablet 0     ciprofloxacin HCl (CIPRO) 500 MG tablet Take 1 tablet (500 mg total) by mouth 2 (two) times daily. 60 tablet 5     citalopram (CELEXA) 20 MG tablet Take 1 tablet (20 mg total) by mouth once daily. 30 tablet 2     evolocumab (REPATHA SURECLICK) 140 mg/mL PnIj Inject 140mg (1 pen) into the skin every 2 weeks. (Patient taking differently: Inject 140mg (1 pen) into the skin every 2 weeks.) 6 mL 3     fluconazole (DIFLUCAN) 200 MG Tab Take 2 tablets (400 mg total) by mouth once daily. 60 tablet 6     lancets 30 gauge Misc Use to test blood sugar daily (Patient taking differently: Use to test blood sugar daily) 100 each 3     lisinopriL-hydrochlorothiazide (PRINZIDE,ZESTORETIC) 20-12.5 mg per tablet Take 2 tablets by mouth once daily. 180 tablet 3     loratadine (CLARITIN) 10 mg tablet Take 10 mg by mouth daily as needed.        loratadine (CLARITIN) 10 mg tablet 1 tablet AS NEEDED (route: oral)       magnesium 30 mg Tab Take by mouth once.       magnesium oxide (MAGOX) 400 mg (241.3 mg magnesium) tablet Take 1 tablet (400 mg total) by mouth once daily. 30 tablet 11     melatonin 10 mg Tab Take 1 tablet (10 mg total) by mouth once daily.       metFORMIN (GLUCOPHAGE-XR) 500 MG ER 24hr  tablet Take 1 tablet (500 mg total) by mouth daily with breakfast. (Patient not taking: Reported on 4/4/2022) 90 tablet 0     MULTIVITAMIN ORAL Take by mouth.       pantoprazole (PROTONIX) 40 MG tablet Take 1 tablet (40 mg total) by mouth once daily. 30 tablet 3     potassium chloride SA (K-DUR,KLOR-CON) 20 MEQ tablet Take 1 tablet (20 mEq total) by mouth once daily. 30 tablet 4        Revlimid [lenalidomide]     Past Medical History:   Diagnosis Date    Allergic rhinitis     Allergy     Anemia     Anxiety     CAD (coronary artery disease)     Carotid stenosis     Colon polyps 2016    Controlled type 2 diabetes mellitus without complication, without long-term current use of insulin 10/19/2016    Depression     GERD (gastroesophageal reflux disease)     Hearing loss in right ear     Hx of psychiatric care     Jose Shcaffer    MDS (myelodysplastic syndrome) with 5q deletion     Psychiatric problem     Therapy      Past Surgical History:   Procedure Laterality Date    BONE MARROW BIOPSY Left 6/7/2018    Procedure: Biopsy-bone marrow;  Surgeon: Gita Valdes MD;  Location: [facility] OR [address] [place] [address];  Service: Oncology;  Laterality: Left;    BONE MARROW BIOPSY Left 3/21/2019    Procedure: Biopsy-bone marrow;  Surgeon: Gita Valdes MD;  [facility];  Service: Oncology;  Laterality: Left;    BONE MARROW BIOPSY Left 10/24/2019    Procedure: Biopsy-bone marrow;  Surgeon: Gita Valdes MD;  [facility];  Service: Oncology;  Laterality: Left;  left iliac crest    BONE MARROW BIOPSY Left 4/21/2021    Procedure: Biopsy-bone marrow;  Surgeon: Gita Valdes MD;  Location: [facility] ENDO (4TH FLR);  Service: Oncology;  Laterality: Left;    BUNIONECTOMY      CAROTID ENDARTERECTOMY Left 2011    CAROTID STENT      COLONOSCOPY N/A 12/20/2016    Procedure: COLONOSCOPY;  Surgeon: PATRICIA Simpson MD;  Location: [facility] ENDO (4TH FLR);  Service: Endoscopy;  Laterality: N/A;  pt has vomiting with anesthesia in past    ENDOMETRIAL  ABLATION      for endometriosis    INSERTION OF TUNNELED CENTRAL VENOUS CATHETER (CVC) WITH SUBCUTANEOUS PORT N/A 2020    Procedure: [id number];  Surgeon: Deepa Diagnostic Provider;  Location: [facility] OR [address] [place] [address];  Service: Radiology;  Laterality: N/A;  189    TONSILLECTOMY      TYMPANOSTOMY TUBE PLACEMENT       Family History       Problem Relation (Age of Onset)    Alcohol abuse Father, Brother    Cancer Paternal Grandmother    Heart disease Mother, Father    Hyperlipidemia Mother          Tobacco Use    Smoking status: Former Smoker     Packs/day: 0.50     Years: 36.00     Pack years: 18.00     Types: Cigarettes, Vaping with nicotine     Quit date: 3/3/2017     Years since quittin.1    Smokeless tobacco: Never Used   Substance and Sexual Activity    Alcohol use: No    Drug use: No    Sexual activity: Not on file       Review of Systems   Constitutional:  Positive for activity change. Negative for chills, diaphoresis and fever.   HENT:  Positive for sinus pressure. Negative for hearing loss, sore throat and trouble swallowing.    Eyes:  Negative for photophobia and visual disturbance.   Respiratory:  Positive for cough and chest tightness. Negative for apnea and shortness of breath.    Cardiovascular:  Negative for chest pain and palpitations.   Gastrointestinal:  Positive for nausea and vomiting. Negative for abdominal distention and abdominal pain.   Endocrine: Negative for polyphagia and polyuria.   Genitourinary:  Negative for dysuria, flank pain, frequency and hematuria.   Musculoskeletal:  Negative for arthralgias, back pain and myalgias.   Skin:  Negative for color change, pallor and rash.   Allergic/Immunologic: Negative for immunocompromised state.   Neurological:  Negative for dizziness, speech difficulty, weakness, numbness and headaches.   Hematological:  Negative for adenopathy.   Psychiatric/Behavioral:  Negative for agitation and behavioral problems.    All other systems reviewed and  are negative.  Objective:     Vital Signs (Most Recent):  Temp: 98.6 °F (37 °C) (04/23/22 2322)  Pulse: 88 (04/23/22 2322)  Resp: 18 (04/23/22 2322)  BP: (!) 157/70 (04/23/22 2322)  SpO2: 96 % (04/23/22 2322)   Vital Signs (24h Range):  Temp:  [98.6 °F (37 °C)-100.2 °F (37.9 °C)] 98.6 °F (37 °C)  Pulse:  [85-94] 88  Resp:  [18] 18  SpO2:  [95 %-98 %] 96 %  BP: (141-169)/(63-73) 157/70     Weight: 70.3 kg (155 lb)  Body mass index is 25.4 kg/m².  Body surface area is 1.8 meters squared.    ECOG SCORE           [unfilled]    Lines/Drains/Airways       Central Venous Catheter Line  Duration             Tunneled Central Line Insertion/Assessment - Double Lumen  02/19/20 1319 right internal jugular 794 days    Port A Cath Single Lumen 02/14/22 1615 68 days              Peripheral Intravenous Line  Duration                  Peripheral IV - Single Lumen 04/23/22 0000 20 G Left Antecubital 1 day                    Physical Exam  Vitals and nursing note reviewed.   Constitutional:       General: She is not in acute distress.     Appearance: Normal appearance. She is well-developed. She is not diaphoretic.   HENT:      Head: Normocephalic and atraumatic.   Eyes:      General: No scleral icterus.     Conjunctiva/sclera: Conjunctivae normal.   Neck:      Vascular: No JVD.      Trachea: No tracheal deviation.   Cardiovascular:      Rate and Rhythm: Normal rate and regular rhythm.      Heart sounds:     No friction rub. No gallop.   Pulmonary:      Effort: Pulmonary effort is normal. No respiratory distress.      Breath sounds: Normal breath sounds. No wheezing.      Comments: Course breath sounds jonh lung fields  Abdominal:      General: Bowel sounds are normal. There is no distension.      Palpations: Abdomen is soft. There is no mass.   Musculoskeletal:         General: No deformity. Normal range of motion.      Cervical back: Normal range of motion and neck supple.   Lymphadenopathy:      Cervical: No cervical adenopathy.    Skin:     General: Skin is warm and dry.      Coloration: Skin is not pale.   Neurological:      Mental Status: She is alert.       Significant Labs:   All pertinent labs from the last 24 hours have been reviewed.    Diagnostic Results:  I have reviewed all pertinent imaging results/findings within the past 24 hours.  I have reviewed and interpreted all pertinent imaging results/findings within the past 24 hours.

## 2022-04-24 NOTE — TELEPHONE ENCOUNTER
Cousin calling to check on pt. Transferred to pt info number. Pt currently admitted.     Reason for Disposition   Patient already left for the hospital/clinic.    Protocols used: NO CONTACT OR DUPLICATE CONTACT CALL-A-AH

## 2022-04-24 NOTE — ASSESSMENT & PLAN NOTE
--MDS with 5q deletion, currently on Revelimid 10 mg daily   - Revelimid currently held 2/2 pancytopenia

## 2022-04-24 NOTE — PLAN OF CARE
Recommendations     1. Continue current Regular diet, add [employer] ONS to aid in caloric intake.   2. RD to monitor & follow-up.     Goals: Meet % EEN, EPN by RD f/u date  Nutrition Goal Status: new  Communication of RD Recs: reviewed with RN

## 2022-04-24 NOTE — ASSESSMENT & PLAN NOTE
Pt with 2 weeks of productive cough, nasal congestion  Upon arrival to the hospial she was hypoxic requiring 3L NC  CXR with likely develop consolidation  Will cont Rocephin and Azithro for CAP coverage (qtc stable)

## 2022-04-24 NOTE — PLAN OF CARE
Pt arrived from the ED at 2240 last night. Pt admitted for  community-acquired PNA. Pt is alert and oriented x4. Pt is on 02 3L NC, 98% saturation. NSR on the monitor. Lung sounds dim in the bases. Skin intact. Pt ambulates with assist. Pt said she uses a walker at baseline. Pt is hard of hearing to her right ear.Pt said she sometimes have trouble with swallowing liquids. Pt swallowed her meds with water without any difficulty. Pt had one loose BM and voided moderate amount of urine. Pt received scheduled IV ABT, Ceftriaxone late because it was not given in the ED. No c/o pain. Safety precaution. Continue to monitor.

## 2022-04-24 NOTE — ASSESSMENT & PLAN NOTE
- S/P left carotid endarterectomy prior to cancer dx, s/p PCI (3 stents) >20 years ago   - Continue on ASA 81mg daily, platelets are wnl  - recommend initiation of statin

## 2022-04-24 NOTE — CONSULTS
[facility] - Oncology ([facility])  Adult Nutrition  Consult Note    SUMMARY     Recommendations    1. Continue current Regular diet, add [employer] ONS to aid in caloric intake.   2. RD to monitor & follow-up.    Goals: Meet % EEN, EPN by RD f/u date  Nutrition Goal Status: new  Communication of RD Recs: reviewed with RN    Assessment and Plan    Moderate malnutrition    Nutrition Problem:  Moderate Protein-Calorie Malnutrition  Malnutrition in the context of Acute Illness/Injury    Related to (etiology):  Inability to consume sufficient energy    Signs and Symptoms (as evidenced by):  Energy Intake: <75% of estimated energy requirement for 3 weeks  Body Fat Depletion: mild and moderate depletion of orbitals and triceps   Muscle Mass Depletion: mild and moderate depletion of temples, clavicle region, scapular region and lower extremities   Weight Loss: 5% x 1 month    Interventions(treatment strategy):  Collaboration of nutrition care w/ other providers  ONS    Nutrition Diagnosis Status:  New     Malnutrition Assessment    Weight Loss (Malnutrition): 5% in 1 month  Energy Intake (Malnutrition): less than 75% for greater than 7 days   Orbital Region (Subcutaneous Fat Loss): mild depletion  Upper Arm Region (Subcutaneous Fat Loss): moderate depletion   [place] Region (Muscle Loss): mild depletion  Clavicle Bone Region (Muscle Loss): moderate depletion  Dorsal Hand (Muscle Loss): mild depletion  Anterior Thigh Region (Muscle Loss): moderate depletion  Posterior Calf Region (Muscle Loss): moderate depletion     Reason for Assessment    Reason For Assessment: consult  Diagnosis: other (see comments) (CAP)  Relevant Medical History: MDS, DM  Interdisciplinary Rounds: did not attend    General Information Comments: Diet just advanced, pt eating breakfast at time of visit this AM. Reports decreased appetite x 3 weeks PTA & UBW of 155-160#; chart review confirms. Pt willing to try ONS. Pt also reports she is lactose  "intolerant. NFPE complete - pt w/ mild-moderate fat & muscle wasting. RD feels pt meets criteria for moderate malnutrition. Please see PES statement for details.  Nutrition Discharge Planning: Adequate PO intake    Nutrition/Diet History    Spiritual, Cultural Beliefs, Hinduism Practices, Values that Affect Care: yes  Supplemental Drinks or Food Habits: Ensure Clear  Factors Affecting Nutritional Intake: other (see comments) (Diet just advanced.)    Anthropometrics    Temp: 98.2 °F (36.8 °C)  Height: 5' 5.5" (166.4 cm)  Height (inches): 65.5 in  Weight Method: Standard Scale  Weight: 69.3 kg (152 lb 12.5 oz)  Weight (lb): 152.78 lb  Ideal Body Weight (IBW), Female: 127.5 lb  % Ideal Body Weight, Female (lb): 119.83 %  BMI (Calculated): 25  BMI Grade: 25 - 29.9 - overweight  Usual Body Weight (UBW), k.7 kg  % Usual Body Weight: 95.52  % Weight Change From Usual Weight: -4.68 %    Lab/Procedures/Meds    Pertinent Labs Reviewed: reviewed  Pertinent Labs Comments: Na 133, A1C 6.1  Pertinent Medications Reviewed: reviewed    Estimated/Assessed Needs    Weight Used For Calorie Calculations: 69.3 kg (152 lb 12.5 oz)     Energy Calorie Requirements (kcal):  kcal/d  Energy Need Method: Kcal/kg (30 kcal/kg)     Protein Requirements: 83 g/d (1.2 g/kg)  Weight Used For Protein Calculations: 69.3 kg (152 lb 12.5 oz)     Estimated Fluid Requirement Method: other (see comments) (Per MD or 1 mL/kcal)  RDA Method (mL):     Nutrition Prescription Ordered    Current Diet Order: Regular    Evaluation of Received Nutrient/Fluid Intake    I/O: +7L since admit    Comments: LBM:     Nutrition Risk    Level of Risk/Frequency of Follow-up:  (1x/week)     Monitor and Evaluation    Food and Nutrient Intake: energy intake, food and beverage intake  Food and Nutrient Adminstration: diet order  Physical Activity and Function: nutrition-related ADLs and IADLs  Anthropometric Measurements: weight, weight change  Biochemical Data, " Medical Tests and Procedures: inflammatory profile, lipid profile, glucose/endocrine profile, gastrointestinal profile, electrolyte and renal panel  Nutrition-Focused Physical Findings: overall appearance     Nutrition Follow-Up    RD Follow-up?: Yes

## 2022-04-24 NOTE — HPI
Pt is a 71 year old woman with history of MDS curerntly on Vidaza and Decogen, CAD, T2DM presents to the hospital with 2 weeks of productive cough and R sided chest pain. Patient states that sxs initiated with nasal congestions which persisted and cont to worsen. A week ago she developed congestion like feeling in chest associated with cough productive of yellow sputum, n/v. Has vomited atleast once a day for the past week. Denies any associated sob/maxwell, palpitations, fever, chills.   Denies any recent sick contact.     Pt is a former tobacco smoker. Quit smoking 3 yrs ago. Denies alcohol use or illicit drug use. Lives by herself and her dogs and independently performs her ADLs and IADLs.     On arrival to the hospital she was afebrile, HR 92, /73, and saturating 98% on 3L NC. Labs with pancytopenia with wbc 2.94, hgb 7.4 and plt 83 (stable from prior). cmp unremarkable. Trop 0.014. EKG w/o new ST-T changes. CXR with mild pulm venous congestion in addition to likely infilrate in LL lobe.  Placed on rocephin and azithro at the ED and admitted to the BMT service for CAP.         Oncology History (per prior notes from Dr. Valdes)  71 year old female with hx of DM2, peripheral vascular disease, tobacco use, CAD, hyperlipidemia, hypertension who was hospitalized 11/10/16 for anemia. hgb 5.3  MCH 43.4 with normal WBC and platelets. Patient had normal iron stores. She was transfused 3 units of PRBCs and discharged home with hgb 8.7 on 11/11/16. She was referred for further evalutation of her anemia. On 12/16/16 patient had a normal SPEP and immunofixation. Slightly elevated kappa light chains with normal ration. Elevated vitamin b12, normal folate, JAYDEN was positive with a low titer and negative profile. Patient had a bone marrow biopsy 1/5/16 which showed the core biopsy is normocellular for age (40%); however, megakaryocytes are increased and show frequent small, hypolobated forms. Additionally, a subset of the  neutrophils are hypogranular. Blasts are not increased by either morphology (1.2%) or in the corresponding flow cytometric analysis. Fish detects a 5q deletion in 54.5% of nuclei. Cytogenetics reported 20 metaphases, 2 metaphases were normal and 18 metaphases had a 5q deletion. No additional cytogenetic abnormalities were detected. Findings consistent with 5q deletion syndrome.     Patient had a delay in obtaining Revlimid 10 mg daily but did start taking the medication 2/8/17. On 2/9/17 patient developed a diffuse maculopapular rash throughout scalp arms, legs and torso. She states she had no stridor or wheezing. She discontinued medication 2/15/17. Went to allergist who provided references on desensitization so that patient could resume Revlimid. Unfortunately developed pancytopenia and Revlimid stopped 6/16/17. She had a repeat BMBX  performed 6/8/17 and showed a hypercellular marrow (60%) continued atypia in the granulocytes and megakaryocytes were noted.  Additionally, there is erythroid atypia present. No increase in blasts. Cytogenetics are normal and MDS FISH is negative, failing to show 5 q minus. NGS should no significant molecular mutations.  Anemia work up revealed bienvenido negative hemolysis.     Revlimid has been stopped since June 2017 after she developed pancytopenia while on Revlimid (required desensitization). CBC was normal for almost 1 year and marrow was negative for del5q. Bone marrow biopsy repeat from 6/2018 showed relapsed 5q minus MDS without new or additional mutations. Revlimid restarted at 2.5 mg daily with prednisone to prevent allergic reaction. Titrated to a goal of 10mg daily Revlimid. Hospital admission 3/12-3/17/19 for unresponsive event after blood transfusion, found to be profoundly pancytopenic at admission. Since hospital admission Revlimid has been stopped. Repeat marrow March 2019 shows persistent MDS with 5q minus.      Started cycle 1 Vidaza 5/6/19 subq for 5 days every 28  days. Restaging bone marrow biopsy from 10/24/19 shows persistent MDS, no excess blasts and 3 of 20 metaphases with 5q deletion.

## 2022-04-25 NOTE — PLAN OF CARE
Pt continues on IV ABT for PNA, no adverse reaction observed. Pt desated to 89% last when she took off NC 02 3L. When NC was reaaplied, sats was only 90%. 02 titrated up to 4L, sats 94-96%. On Call Provider made aware. Pt had 2 loose BM's this shift. Pt c/o headache this AM, Tylenol order given and administered. Pt endorses feeling much better. No c/o abdominal pain. Afebrile. Safety precaution in place. Continue to monitor.

## 2022-04-25 NOTE — PT/OT/SLP EVAL
"Physical Therapy Evaluation and Treatment    Patient Name:  Susan Puente   MRN:  [id number]    Recommendations:     Discharge Recommendations:  home health PT   Discharge Equipment Recommendations: none   Barriers to discharge: Decreased caregiver support    Assessment:       Susan Puente is a 71 y.o. female admitted with a medical diagnosis of CAP (community acquired pneumonia).  She presents with the following impairments/functional limitations:  weakness, impaired endurance, impaired functional mobilty, gait instability, impaired balance, decreased safety awareness.  Pt participated in functional mobility training and significant education on this date as pt endorsing several concerns re: possible d/c. Pt able to perform bed mobility, transfers, and ambulate ~130 ft with RW and stand by assist. Reviewed DME recommendations, role of HHPT, and concerns for discharge. Pt continues to present below their independent prior functional baseline. Pt would benefit from continued, skilled PT while in house to address the above listed impairments, further progress mobility as able, return towards highest level of function.      Rehab Prognosis: Good; patient would benefit from acute skilled PT services 3 x/week to address these deficits and reach maximum level of function.  Patient is most appropriate to go to home health PT .  Recent Surgery: * No surgery found *      Plan:     During this hospitalization, patient to be seen 3 x/week to address the identified rehab impairments via gait training, therapeutic activities, therapeutic exercises, neuromuscular re-education and progress toward the following goals:    · Plan of Care Expires:  05/24/22    Subjective     Subjective:"I need to get a lot of things in order if I'm leaving soon"  Pt goal: none defined  Pain/Comfort:  · Pain Rating 1: 0/10    Patients cultural, spiritual, Jainism conflicts given the current situation: no    Living Environment: Pt lives " alone in a two story townhouse with a few LIGIA and bedroom and full bath upstairs.   Prior level of function:  Independent with use of rollator, independent with ADLs, driving  Falls within the last 90 days: denies  Equipment owned: rollator  Support available upon discharge: none, has not needed    Objective:     Communicated with nursing prior to session.  Patient found supine with oxygen, telemetry  upon PT entry to room.     General Precautions: Standard, fall   Orthopedic Precautions:N/A   Braces: N/A     Exams:  · Cognition: appropriate and cooperative   · RLE ROM: WFL  · RLE Strength: WFL  · LLE ROM: WFL  · LLE Strength: WFL  · Posture: rounded shoulders, forward head  · Integumentary: intact  · Sensation: intact    Functional Mobility:  · Bed Mobility: supervision for supine<>sit  · Transfers: supervision for sit<>stand from EOB with RW  · Gait: Pt ambulated ~130 ft with RW and stand by assist/supervision on 2L oxygen. Pt demo's reduced vinnie, limited foot clearance, and increased time in double leg stance. Several standing rest breaks self initiated by pt as needed.   · Balance:   · Sitting: intact  · Standing: supervision while standing, no overt LOB noted    Therapeutic activities and education:  Significant education provided to pt re: d/c planning and PT POC. Pt asking questions about various services to assist with care following discharge, specifically rides/transporation, role of HH, role of private pay caregivers. Pt exploring various options for assistance and services upon d/c. Education provided re: pacing strategies, pursed lip breathing, and use of oxygen monitoring as pt endorsing concern for SOB. Reviewed DME options and pt's goals for discharge.     AM-PAC 6 CLICK MOBILITY  Total Score:22     Patient left supine with all lines intact and call button in reach.    GOALS:   Multidisciplinary Problems     Physical Therapy Goals        Problem: Physical Therapy    Goal Priority Disciplines  Outcome Goal Variances Interventions   Physical Therapy Goal     PT, PT/OT Ongoing, Progressing     Description: Goals to be met by: 22    Patient will increase functional independence with mobility by performin. Pt will perform supine<>sit with independence to improve independence with mobility  2. Pt will perform functional transfers with independence   3. Pt will ambulate >150 ft feet with LRAD and supervision to safely perform household distance ambulation  4. Pt will ascend/descend 5 stairs with supervision to safely manage within home.                    History:     Past Medical History:   Diagnosis Date    Allergic rhinitis     Allergy     Anemia     Anxiety     CAD (coronary artery disease)     Carotid stenosis     Colon polyps     Controlled type 2 diabetes mellitus without complication, without long-term current use of insulin 10/19/2016    Depression     GERD (gastroesophageal reflux disease)     Hearing loss in right ear     Hx of psychiatric care     Celexa, Prozac    MDS (myelodysplastic syndrome) with 5q deletion     Psychiatric problem     Therapy        Past Surgical History:   Procedure Laterality Date    BONE MARROW BIOPSY Left 2018    Procedure: Biopsy-bone marrow;  Surgeon: Gita Valdes MD;  Location: [facility] OR [address] [place] [address];  Service: Oncology;  Laterality: Left;    BONE MARROW BIOPSY Left 3/21/2019    Procedure: Biopsy-bone marrow;  Surgeon: Gita Valdes MD;  [facility];  Service: Oncology;  Laterality: Left;    BONE MARROW BIOPSY Left 10/24/2019    Procedure: Biopsy-bone marrow;  Surgeon: Gita Valdes MD;  [facility];  Service: Oncology;  Laterality: Left;  left iliac crest    BONE MARROW BIOPSY Left 2021    Procedure: Biopsy-bone marrow;  Surgeon: Gita Valdes MD;  Location: [facility] ENDO (4TH FLR);  Service: Oncology;  Laterality: Left;    BUNIONECTOMY      CAROTID ENDARTERECTOMY Left     CAROTID STENT      COLONOSCOPY N/A  12/20/2016    Procedure: COLONOSCOPY;  Surgeon: PATRICIA Simpson MD;  Location: [facility] (4TH FLR);  Service: Endoscopy;  Laterality: N/A;  pt has vomiting with anesthesia in past    ENDOMETRIAL ABLATION      for endometriosis    INSERTION OF TUNNELED CENTRAL VENOUS CATHETER (CVC) WITH SUBCUTANEOUS PORT N/A 2/19/2020    Procedure: [id number];  Surgeon: Deepa Diagnostic Provider;  Location: [facility] OR 2ND FLR;  Service: Radiology;  Laterality: N/A;  189    TONSILLECTOMY      TYMPANOSTOMY TUBE PLACEMENT         Time Tracking:     PT Received On: 04/25/22  PT Start Time: 1020     PT Stop Time: 1054  PT Total Time (min): 34 min     Billable Minutes: Evaluation 1 unit and Therapeutic Activity 23 min      Eden Meade, PT, DPT, GCS  04/25/2022

## 2022-04-25 NOTE — PLAN OF CARE
Problem: Physical Therapy  Goal: Physical Therapy Goal  Description: Goals to be met by: 22    Patient will increase functional independence with mobility by performin. Pt will perform supine<>sit with independence to improve independence with mobility  2. Pt will perform functional transfers with independence   3. Pt will ambulate >150 ft feet with LRAD and supervision to safely perform household distance ambulation  4. Pt will ascend/descend 5 stairs with supervision to safely manage within home.   Outcome: Ongoing, Progressing     Evaluation completed and goals currently appropriate at this time.    Eden Meade, PT, DPT, GCS  2022

## 2022-04-25 NOTE — PT/OT/SLP EVAL
"Occupational Therapy   Evaluation    Name: Susan Puente  MRN: [id number]  Admitting Diagnosis:  CAP (community acquired pneumonia)  Recent Surgery: * No surgery found *      Recommendations:     Discharge Recommendations: home health OT  Discharge Equipment Recommendations:  none  Barriers to discharge:  Other (Comment) (needs increased assistance)    Assessment:     Susan Puente is a 71 y.o. female with a medical diagnosis of CAP (community acquired pneumonia). Patient tolerated session well, with pleasant demeanor. Patient able to complete bed mobility with SBA, transfers SBA and RW, ADLs with SBA while in bathroom.     She presents with performance deficits affecting function: weakness, impaired endurance, impaired self care skills, impaired functional mobilty, impaired balance, gait instability, decreased safety awareness.     Discharge: Home health OT recommended. Patient communicated she'd like more involved general HH than previous discharge.      Rehab Prognosis: Good; patient would benefit from acute skilled OT services to address these deficits and reach maximum level of function.       Plan:     Patient to be seen 2 x/week to address the above listed problems via self-care/home management, therapeutic activities, therapeutic exercises  · Plan of Care Expires: 05/15/22  · Plan of Care Reviewed with: patient    Subjective     Chief Complaint: "I'm feeling good, just wrapped up like a taco. It's so cold in here"   Patient/Family Comments/goals: Get better, get stronger, return home    Occupational Profile:  Living Environment: Patient lives alone with 2 pet dogs in 2SH townhouse with ~19STE inside and bedroom on 2nd floor. Outside, patient has highly graded driveway with long steps, with no handrail. Patient has WIS with bath bench.   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  Patient found supine with oxygen, telemetry upon OT entry to room.    General Precautions: Standard, fall   Orthopedic Precautions:N/A   Braces: N/A  Respiratory Status: Nasal cannula, flow 2 L/min    Occupational Performance:    Bed Mobility:    · Patient completed Rolling/Turning to Left with  stand by assistance  · Patient completed Rolling/Turning to Right with stand by assistance  · Patient completed Scooting/Bridging with stand by assistance  · Patient completed Supine to Sit with stand by assistance  · Patient completed Sit to Supine with stand by assistance    Functional Mobility/Transfers:  · Patient completed Sit <> Stand Transfer across 4 trials:   · 1st trial: Sit>Stand from EOB with SBA with  rolling walker  · 2nd trial: Stand > Sit to toilet with SBA and RW  · 3rd trial: Sit>Stand from toilet with SBA and RW  · 4th trial: Stand > Sit to EOB with SBA and rW  · Patient completed Toilet Transfer Step Transfer technique with stand by assistance with  rolling walker  · Patient completed sustained stand at sink ~8min to brush teeth and wash hands  · Functional Mobility: Pt engaging in functional mobility to simulate household/community distances approx utilizing RW and SBA to navigate oxygen line in order to maximize functional activity tolerance and standing balance required for engagement in occupations of choice.     Activities of Daily Living:  · Grooming: supervision brushing teeth while standing at sink, washing hands while standing at sink   · Lower Body Dressing: supervision doffing and donning briefs before/after toileting  · Toileting: supervision toileting and completing samanta-care while seated on  toilet    Cognitive/Visual Perceptual:  Cognitive/Psychosocial Skills:     -       Oriented to: AOx4   -       Follows Commands/attention:Follows multistep  commands  -       Communication: clear/fluent  -       Memory: No Deficits noted  -       Safety awareness/insight to disability: impaired   -       Mood/Affect/Coping skills/emotional control: Appropriate to situation and Pleasant  Visual/Perceptual:      -Intact      Physical Exam:  Balance:    -       Intact  Postural examination/scapula alignment:    -       Rounded shoulders  Skin integrity: Visible skin intact  Edema:  None noted  Sensation:    -       Intact  Motor Planning:    -       WFL  Dominant hand:    -       Right  Upper Extremity Range of Motion:     Upper Extremity Strength:  WFL   Strength:  WFL  Fine Motor Coordination: WFL    Gross motor coordination:   WFL  Neurological:    -       Intact    AMPAC 6 Click ADL:  AMPAC Total Score: 18    Treatment & Education:   Pt educated on role of OT, POC, and goals for therapy.     POC was dicussed with patient/caregiver, who was included in its development and is in agreement with the identified goals and treatment plan.    Pt educated in body mechanics while using RW   Pt educated in body mechanics while completing bed mobility   Pt educated in discharge plans   Pt educated in ideas to simplify preparing food in kitchen (moving microwave to counter level to compensate for decreased BUE strength)   Patient and family aware of patient's deficits and therapy progression.    Time provided for therapeutic counseling and discussion of health disposition.    Educated on importance of EOB/OOB mobility, maintaining routine, sitting up in chair, and maximizing independence with ADLs during admission    Pt completed ADLs and functional mobility for treatment session as noted above    Pt/caregiver verbalized understanding and expressed no further concerns/questions.   Updated communication board  with therapist name and level of assist required    Education:    Patient left supine with all lines intact, call button in reach and RN notified    GOALS:   Multidisciplinary Problems     Occupational Therapy Goals        Problem: Occupational Therapy    Goal Priority Disciplines Outcome Interventions   Occupational Therapy Goal     OT, PT/OT Ongoing, Progressing    Description: Goals to be met by: 5/15/22     Patient will increase functional independence with ADLs by performing:    Grooming while standing at sink with Supervision.  Toileting from toilet with Modified [place] for hygiene and clothing management.   Supine to sit with Set-up Assistance.  Step transfer with Supervision                     History:     Past Medical History:   Diagnosis Date    Allergic rhinitis     Allergy     Anemia     Anxiety     CAD (coronary artery disease)     Carotid stenosis     Colon polyps 2016    Controlled type 2 diabetes mellitus without complication, without long-term current use of insulin 10/19/2016    Depression     GERD (gastroesophageal reflux disease)     Hearing loss in right ear     Hx of psychiatric care     Celexa, Prozac    MDS (myelodysplastic syndrome) with 5q deletion     Psychiatric problem     Therapy        Past Surgical History:   Procedure Laterality Date    BONE MARROW BIOPSY Left 6/7/2018    Procedure: Biopsy-bone marrow;  Surgeon: Gita Valdes MD;  Location: [address];  Service: Oncology;  Laterality: Left;    BONE MARROW BIOPSY Left 3/21/2019    Procedure: Biopsy-bone marrow;  Surgeon: Gita Valdes MD;  [address];  Service: Oncology;  Laterality: Left;    BONE MARROW BIOPSY Left 10/24/2019    Procedure: Biopsy-bone marrow;  Surgeon: Gita Valdes MD;  [address];  Service: Oncology;  Laterality: Left;  left iliac crest    BONE MARROW BIOPSY Left 4/21/2021    Procedure: Biopsy-bone marrow;  Surgeon: Gita Valdes MD;  Location: [address]  [facility]);  Service: Oncology;  Laterality: Left;    BUNIONECTOMY      CAROTID ENDARTERECTOMY Left 2011    CAROTID STENT      COLONOSCOPY N/A 12/20/2016    Procedure: COLONOSCOPY;  Surgeon: PATRICIA Simpson MD;  Location: [facility] (4TH FLR);  Service: Endoscopy;  Laterality: N/A;  pt has vomiting with anesthesia in past    ENDOMETRIAL ABLATION      for endometriosis    INSERTION OF TUNNELED CENTRAL VENOUS CATHETER (CVC) WITH SUBCUTANEOUS PORT N/A 2/19/2020    Procedure: [id number];  Surgeon: Deepa Diagnostic Provider;  Location: [facility];  Service: Radiology;  Laterality: N/A;  189    TONSILLECTOMY      TYMPANOSTOMY TUBE PLACEMENT         Time Tracking:     OT Date of Treatment: 04/25/22  OT Start Time: 1130  OT Stop Time: 1200  OT Total Time (min): 30 min    Billable Minutes:Evaluation 6  Self Care/Home Management 10  Therapeutic Activity 14    4/25/2022

## 2022-04-25 NOTE — PLAN OF CARE
Pt AXOX4. In NAD. Resting quietly in bed. Pt hgb 6.9 order for 1 unit PRBC,. Call Received from blood bank Stated that they will issue the blood tomorrow due to pt type of Antibody. MD made aware. Charge nurse made aware. [facility]  Problem: Adult Inpatient Plan of Care  Goal: Plan of Care Review  Outcome: Ongoing, Progressing  Goal: Patient-Specific Goal (Individualized)  Outcome: Ongoing, Progressing  Goal: Absence of Hospital-Acquired Illness or Injury  Outcome: Ongoing, Progressing  Goal: Optimal Comfort and Wellbeing  Outcome: Ongoing, Progressing  Goal: Readiness for Transition of Care  Outcome: Ongoing, Progressing     Problem: Infection  Goal: Absence of Infection Signs and Symptoms  Outcome: Ongoing, Progressing     Problem: Adjustment to Illness (Sepsis/Septic Shock)  Goal: Optimal Coping  Outcome: Ongoing, Progressing     Problem: Bleeding (Sepsis/Septic Shock)  Goal: Absence of Bleeding  Outcome: Ongoing, Progressing     Problem: Glycemic Control Impaired (Sepsis/Septic Shock)  Goal: Blood Glucose Level Within Desired Range  Outcome: Ongoing, Progressing     Problem: Infection Progression (Sepsis/Septic Shock)  Goal: Absence of Infection Signs and Symptoms  Outcome: Ongoing, Progressing     Problem: Nutrition Impaired (Sepsis/Septic Shock)  Goal: Optimal Nutrition Intake  Outcome: Ongoing, Progressing     Problem: Fluid and Electrolyte Imbalance (Acute Kidney Injury/Impairment)  Goal: Fluid and Electrolyte Balance  Outcome: Ongoing, Progressing     Problem: Oral Intake Inadequate (Acute Kidney Injury/Impairment)  Goal: Optimal Nutrition Intake  Outcome: Ongoing, Progressing     Problem: Renal Function Impairment (Acute Kidney Injury/Impairment)  Goal: Effective Renal Function  Outcome: Ongoing, Progressing     Problem: Diabetes Comorbidity  Goal: Blood Glucose Level Within Targeted Range  Outcome: Ongoing, Progressing     Problem: Fluid Imbalance (Pneumonia)  Goal: Fluid Balance  Outcome: Ongoing,  Progressing     Problem: Infection (Pneumonia)  Goal: Resolution of Infection Signs and Symptoms  Outcome: Ongoing, Progressing     Problem: Respiratory Compromise (Pneumonia)  Goal: Effective Oxygenation and Ventilation  Outcome: Ongoing, Progressing

## 2022-04-25 NOTE — PLAN OF CARE
Patient tolerated session well, with pleasant demeanor. Patient able to complete bed mobility with SBA, transfers SBA and RW, ADLs with SBA while in bathroom.     Problem: Occupational Therapy  Goal: Occupational Therapy Goal  Description: Goals to be met by: 5/15/22     Patient will increase functional independence with ADLs by performing:    Grooming while standing at sink with Supervision.  Toileting from toilet with Modified [place] for hygiene and clothing management.   Supine to sit with Set-up Assistance.  Step transfer with Supervision    Outcome: Ongoing, Progressing

## 2022-04-26 PROBLEM — T45.1X5A PANCYTOPENIA DUE TO ANTINEOPLASTIC CHEMOTHERAPY: Status: ACTIVE | Noted: 2022-01-01

## 2022-04-26 PROBLEM — J96.01 ACUTE RESPIRATORY FAILURE WITH HYPOXIA: Status: ACTIVE | Noted: 2022-01-01

## 2022-04-26 PROBLEM — D61.810 PANCYTOPENIA DUE TO ANTINEOPLASTIC CHEMOTHERAPY: Status: ACTIVE | Noted: 2022-01-01

## 2022-04-26 NOTE — SUBJECTIVE & OBJECTIVE
Subjective:     Interval History: Continues to be afebrile. VSS. Stopped rocephin and started LVQ for CAP. Will complete a 7 day course of abx on 4/30/22. Requiring supplemental O2. Six minute walk test ordered to determine if patient will qualify for home O2. Will transfuse 1 unit prbc for thgb of 6.9 today. Tentatively planning to discharge home today.      Objective:     Vital Signs (Most Recent):  Temp: 97.7 °F (36.5 °C) (04/26/22 1028)  Pulse: 76 (04/26/22 1028)  Resp: 17 (04/26/22 1028)  BP: (!) 148/65 (04/26/22 1028)  SpO2: 95 % (04/26/22 1028)   Vital Signs (24h Range):  Temp:  [97.4 °F (36.3 °C)-98.6 °F (37 °C)] 97.7 °F (36.5 °C)  Pulse:  [76-99] 76  Resp:  [16-18] 17  SpO2:  [90 %-98 %] 95 %  BP: (137-169)/(58-77) 148/65     Weight: 69.5 kg (153 lb 3.5 oz)  Body mass index is 25.11 kg/m².  Body surface area is 1.79 meters squared.    ECOG SCORE             Intake/Output - Last 3 Shifts         04/24 0700  04/25 0659 04/25 0700  04/26 0659 04/26 0700  04/27 0659    P.O. 140 960     Blood   350    IV Piggyback  99.2     Total Intake(mL/kg) 140 (2) 1059.2 (15.2) 350 (5)    Urine (mL/kg/hr) 500 (0.3)      Emesis/NG output 200      Stool 100      Total Output 800      Net -660 +1059.2 +350           Urine Occurrence 1 x      Stool Occurrence 2 x              Physical Exam  Vitals and nursing note reviewed.   Constitutional:       General: She is not in acute distress.     Appearance: Normal appearance. She is well-developed. She is not diaphoretic.   HENT:      Head: Normocephalic and atraumatic.   Eyes:      General: No scleral icterus.     Conjunctiva/sclera: Conjunctivae normal.   Neck:      Vascular: No JVD.      Trachea: No tracheal deviation.   Cardiovascular:      Rate and Rhythm: Normal rate and regular rhythm.      Heart sounds:     No friction rub. No gallop.   Pulmonary:      Effort: No respiratory distress.      Breath sounds: No wheezing.      Comments: Course breath sounds jonh lung  fields  Abdominal:      General: Bowel sounds are normal. There is no distension.      Palpations: Abdomen is soft. There is no mass.   Musculoskeletal:         General: No deformity. Normal range of motion.      Cervical back: Normal range of motion and neck supple.   Lymphadenopathy:      Cervical: No cervical adenopathy.   Skin:     General: Skin is warm and dry.      Coloration: Skin is not pale.   Neurological:      Mental Status: She is alert.       Significant Labs:   CBC:   Recent Labs   Lab 04/25/22  0847 04/26/22  0323   WBC 1.83* 2.49*   HGB 6.9* 6.9*   HCT 20.9* 20.9*   PLT 86* 117*     , CMP:   Recent Labs   Lab 04/25/22  0847 04/26/22  0323   * 133*   K 3.9 3.7   CL 97 93*   CO2 29 30*    119*   BUN 11 11   CREATININE 0.7 0.7   CALCIUM 9.1 8.8   PROT 6.1 5.9*   ALBUMIN 2.4* 2.5*   BILITOT 0.3 0.2   ALKPHOS 54* 65   AST 18 21   ALT 27 30   ANIONGAP 9 10   EGFRNONAA >60.0 >60.0     , and Reticulocytes: No results for input(s): RETIC in the last 48 hours.    Diagnostic Results:  I have reviewed and interpreted all pertinent imaging results/findings within the past 24 hours.

## 2022-04-26 NOTE — PROGRESS NOTES
[facility] - Oncology ([facility])  Hematology  Bone Marrow Transplant  Progress Note    Patient Name: Susan Puente  Admission Date: 4/23/2022  Hospital Length of Stay: 3 days  Code Status: DNR    Subjective:     Interval History: Continues to be afebrile. VSS. Stopped rocephin and started LVQ for CAP. Will complete a 7 day course of abx on 4/30/22. Requiring supplemental O2. Six minute walk test ordered to determine if patient will qualify for home O2. Will transfuse 1 unit prbc for thgb of 6.9 today. Tentatively planning to discharge home today.      Objective:     Vital Signs (Most Recent):  Temp: 97.7 °F (36.5 °C) (04/26/22 1028)  Pulse: 76 (04/26/22 1028)  Resp: 17 (04/26/22 1028)  BP: (!) 148/65 (04/26/22 1028)  SpO2: 95 % (04/26/22 1028)   Vital Signs (24h Range):  Temp:  [97.4 °F (36.3 °C)-98.6 °F (37 °C)] 97.7 °F (36.5 °C)  Pulse:  [76-99] 76  Resp:  [16-18] 17  SpO2:  [90 %-98 %] 95 %  BP: (137-169)/(58-77) 148/65     Weight: 69.5 kg (153 lb 3.5 oz)  Body mass index is 25.11 kg/m².  Body surface area is 1.79 meters squared.    ECOG SCORE             Intake/Output - Last 3 Shifts         04/24 0700  04/25 0659 04/25 0700  04/26 0659 04/26 0700  04/27 0659    P.O. 140 960     Blood   350    IV Piggyback  99.2     Total Intake(mL/kg) 140 (2) 1059.2 (15.2) 350 (5)    Urine (mL/kg/hr) 500 (0.3)      Emesis/NG output 200      Stool 100      Total Output 800      Net -660 +1059.2 +350           Urine Occurrence 1 x      Stool Occurrence 2 x              Physical Exam  Vitals and nursing note reviewed.   Constitutional:       General: She is not in acute distress.     Appearance: Normal appearance. She is well-developed. She is not diaphoretic.   HENT:      Head: Normocephalic and atraumatic.   Eyes:      General: No scleral icterus.     Conjunctiva/sclera: Conjunctivae normal.   Neck:      Vascular: No JVD.      Trachea: No tracheal deviation.   Cardiovascular:      Rate and Rhythm: Normal rate and regular  rhythm.      Heart sounds:     No friction rub. No gallop.   Pulmonary:      Effort: No respiratory distress.      Breath sounds: No wheezing.      Comments: Course breath sounds jonh lung fields  Abdominal:      General: Bowel sounds are normal. There is no distension.      Palpations: Abdomen is soft. There is no mass.   Musculoskeletal:         General: No deformity. Normal range of motion.      Cervical back: Normal range of motion and neck supple.   Lymphadenopathy:      Cervical: No cervical adenopathy.   Skin:     General: Skin is warm and dry.      Coloration: Skin is not pale.   Neurological:      Mental Status: She is alert.       Significant Labs:   CBC:   Recent Labs   Lab 04/25/22  0847 04/26/22  0323   WBC 1.83* 2.49*   HGB 6.9* 6.9*   HCT 20.9* 20.9*   PLT 86* 117*     , CMP:   Recent Labs   Lab 04/25/22  0847 04/26/22  0323   * 133*   K 3.9 3.7   CL 97 93*   CO2 29 30*    119*   BUN 11 11   CREATININE 0.7 0.7   CALCIUM 9.1 8.8   PROT 6.1 5.9*   ALBUMIN 2.4* 2.5*   BILITOT 0.3 0.2   ALKPHOS 54* 65   AST 18 21   ALT 27 30   ANIONGAP 9 10   EGFRNONAA >60.0 >60.0     , and Reticulocytes: No results for input(s): RETIC in the last 48 hours.    Diagnostic Results:  I have reviewed and interpreted all pertinent imaging results/findings within the past 24 hours.    Assessment/Plan:     * CAP (community acquired pneumonia)  - Pt with 2 weeks of productive cough, nasal congestion  - Upon arrival to the hospial she was hypoxic requiring 3L NC, unable to wean O2 today  - CXR with likely develop consolidation  - CTA shows New patchy consolidative opacities in the in the right upper and posterior right lower lobes.  Findings suggestive infectious etiology such as pneumonia, multifocal pneumonia.  Also consider atypical given patient's reported immunocompromised status.  Aspiration could present similarly.  Consider continued follow-up after acute clinical illness has resolved to ensure resolution. New  mediastinal and bilateral hilar lymphadenopathy, likely reactive.  - completed azithromycin.     Acute respiratory failure with hypoxia  Patient with Hypoxic Respiratory failure which is Acute.  she is not on home oxygen. Supplemental oxygen was provided and noted-  .   Signs/symptoms of respiratory failure include- tachypnea and increased work of breathing. Contributing diagnoses includes - ARDS Labs and images were reviewed. Patient Has not had a recent ABG. Will treat underlying causes and adjust management of respiratory failure as follows- will perform six minute walk test to determine if patient qualifies for home O2.    MDS (myelodysplastic syndrome)  - Primary oncologist, Dr. 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 Developed allergy and was then desensitized. Soon after developed pancytopenia and Revlimid was stopped June 2017.    - BMBX on 6/8/17 was with normal cytogenetics. 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 Of 20 metaphases, 5 were normal and 15 had a 5q deletion, NGS positive for SF3B1 and TP53 (12%). 1.4% blasts  - Received vidaza for 22 cycles, received Decitabine for C23 due to national shortage of vidaza and then back to vidaza with C24.  - completed cycle 39 on 4/8/22    Pancytopenia due to antineoplastic chemotherapy  Transfuse for hgb < 7 or plts < 10K  Daily cbc while inpatient  Continue acyclovir ppx    Moderate malnutrition  Nutrition consulted. Most recent weight and BMI  monitored-          Malnutrition (Moderate to Severe)  Weight Loss (Malnutrition): 5% in 1 month  Energy Intake (Malnutrition): less than 75% for greater than 7 days              Measurements:  Wt Readings from Last 1 Encounters:   04/25/22 69.5 kg (153 lb 3.5 oz)   Body mass index is 25.11 kg/m².    Recommendations: Recommendation/Intervention: 1. Continue current Regular diet, add [employer] ONS to aid in caloric intake. 2. RD to monitor & follow-up.  Goals: Meet % EEN, EPN by RD f/u date    Patient has been screened and assessed by RD. RD will follow patient.      Controlled type 2 diabetes mellitus without complication, without long-term current use of insulin  Will hold home po diabetic medications  SSI  Goal bg 140-180    Adjustment disorder with mixed anxiety and depressed mood  - Cont celexa    Essential hypertension  Home medications include lisinopril/hctz and coreg  Coreg initiated  BP elevated and home lisinopril/hctz retstarted    Anemia requiring transfusions  - 2/2 MDS  - hgb  6.9 today  - Will transfuse for hgb <7    CAD (coronary artery disease)  - S/P left carotid endarterectomy prior to cancer dx, s/p PCI (3 stents) >20 years ago   - Continue on ASA 81mg daily, platelets are wnl  - Initiation of statin?        VTE Risk Mitigation (From admission, onward)         Ordered     IP VTE HIGH RISK PATIENT  Once         04/23/22 1844     Place sequential compression device  Until discontinued         04/23/22 1844                Disposition: Inpatient.    Altagracia Cornejo, SAMMIE  Bone Marrow Transplant  [facility] - Oncology ([facility])

## 2022-04-26 NOTE — HOSPITAL COURSE
04/25/2022 admitted with cough and R sided chest pain. D-dimer elevated, CTA done revealing no PE but RUL and RLL PNA. T.max 100.2. on Rocephin and Zithromax. Remains on O2 at 2L. Transfusing 1 unit PRBC today. PT/OT sandieal recommending HH. She has no complaints today.   04/26/2022: Continues to be afebrile. VSS. Stopped rocephin and started LVQ for CAP. Will complete a 7 day course of abx on 4/30/22. Requiring supplemental O2. Six minute walk test ordered to determine if patient will qualify for home O2. Will transfuse 1 unit prbc for thgb of 6.9 today. Tentatively planning to discharge home today.  04/27/2022: Remains afebrile off IV abx. Continuing oral abx through 4/30 for CAP treatment. Today is day 4 of 7 of treatment. Plan is to discharge home with home health pending home oxygen approval.

## 2022-04-26 NOTE — PROGRESS NOTES
Burak Cordova - Oncology ([facility])  Hematology  Bone Marrow Transplant  Progress Note    Patient Name: Susan Puente  Admission Date: 4/23/2022  Hospital Length of Stay: 2 days  Code Status: DNR    Subjective:     Interval History: admitted with cough and R sided chest pain. D-dimer elevated, CTA done revealing no PE but RUL and RLL PNA. T.max 100.2. on Rocephin and Zithromax. Remains on O2 at 2L. Transfusing 1 unit PRBC today. PT/OT eval recommending HH. She has no complaints today.     Objective:     Vital Signs (Most Recent):  Temp: 97.4 °F (36.3 °C) (04/25/22 1230)  Pulse: 99 (04/25/22 1922)  Resp: 18 (04/25/22 1526)  BP: (Abnormal) 137/58 (04/25/22 1230)  SpO2: (Abnormal) 90 % (04/25/22 1526)   Vital Signs (24h Range):  Temp:  [97.4 °F (36.3 °C)-99.3 °F (37.4 °C)] 97.4 °F (36.3 °C)  Pulse:  [74-99] 99  Resp:  [16-18] 18  SpO2:  [88 %-99 %] 90 %  BP: (137-176)/(58-71) 137/58     Weight: 69.5 kg (153 lb 3.5 oz)  Body mass index is 25.11 kg/m².  Body surface area is 1.79 meters squared.    ECOG SCORE             Intake/Output - Last 3 Shifts       Intake/Output Category 04/23 0700 to 04/24 0659 04/24 0700 to 04/25 0659 04/25 0700 to 04/26 0659    P.O. 120 140 480    IV Piggyback 250      Total Intake(mL/kg) 370 (5.3) 140 (2) 480 (6.9)    Urine (mL/kg/hr) 300 500 (0.3)     Emesis/NG output  200     Stool 0 100     Total Output 300 800     Net +70 -660 +480           Urine Occurrence  1 x     Stool Occurrence 1 x 2 x             Physical Exam  Vitals and nursing note reviewed.   Constitutional:       General: She is not in acute distress.     Appearance: Normal appearance. She is well-developed. She is not diaphoretic.   HENT:      Head: Normocephalic and atraumatic.   Eyes:      General: No scleral icterus.     Conjunctiva/sclera: Conjunctivae normal.   Neck:      Vascular: No JVD.      Trachea: No tracheal deviation.   Cardiovascular:      Rate and Rhythm: Normal rate and regular rhythm.      Heart sounds:      No friction rub. No gallop.   Pulmonary:      Effort: Pulmonary effort is normal. No respiratory distress.      Breath sounds: Normal breath sounds. No wheezing.      Comments: Course breath sounds jonh lung fields  Abdominal:      General: Bowel sounds are normal. There is no distension.      Palpations: Abdomen is soft. There is no mass.   Musculoskeletal:         General: No deformity. Normal range of motion.      Cervical back: Normal range of motion and neck supple.   Lymphadenopathy:      Cervical: No cervical adenopathy.   Skin:     General: Skin is warm and dry.      Coloration: Skin is not pale.   Neurological:      Mental Status: She is alert.       Significant Labs:   CBC:   Recent Labs   Lab 04/24/22  0339 04/25/22  0847   WBC 2.63* 1.83*   HGB 7.4* 6.9*   HCT 22.1* 20.9*   PLT 86* 86*   , CMP:   Recent Labs   Lab 04/24/22  0339 04/25/22  0847   * 135*   K 4.0 3.9   CL 98 97   CO2 28 29   * 105   BUN 13 11   CREATININE 0.7 0.7   CALCIUM 8.9 9.1   PROT 6.1 6.1   ALBUMIN 2.5* 2.4*   BILITOT 0.5 0.3   ALKPHOS 54* 54*   AST 19 18   ALT 28 27   ANIONGAP 7* 9   EGFRNONAA >60.0 >60.0   , and Reticulocytes: No results for input(s): RETIC in the last 48 hours.    Diagnostic Results:  I have reviewed and interpreted all pertinent imaging results/findings within the past 24 hours.    Assessment/Plan:     * CAP (community acquired pneumonia)  - Pt with 2 weeks of productive cough, nasal congestion  - Upon arrival to the hospial she was hypoxic requiring 3L NC, unable to wean O2 today  - CXR with likely develop consolidation  - CTA shows New patchy consolidative opacities in the in the right upper and posterior right lower lobes.  Findings suggestive infectious etiology such as pneumonia, multifocal pneumonia.  Also consider atypical given patient's reported immunocompromised status.  Aspiration could present similarly.  Consider continued follow-up after acute clinical illness has resolved to ensure  resolution. New mediastinal and bilateral hilar lymphadenopathy, likely reactive.  - cont Rocephin and Azithro for CAP coverage (qtc stable)    MDS (myelodysplastic syndrome)  - Primary oncologist, Dr. 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 Developed allergy and was then desensitized. Soon after developed pancytopenia and Revlimid was stopped June 2017.    - BMBX on 6/8/17 was with normal cytogenetics. 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 Of 20 metaphases, 5 were normal and 15 had a 5q deletion, NGS positive for SF3B1 and TP53 (12%). 1.4% blasts  - Received vidaza for 22 cycles, received Decitabine for C23 due to national shortage of vidaza and then back to vidaza with C24.  - completed cycle 39 on 4/8/22    Anemia requiring transfusions  - 2/2 MDS  - hgb  6.9 today  - Will transfuse for hgb <7    Moderate malnutrition  Nutrition consulted. Most recent weight and BMI monitored-          Malnutrition (Moderate to Severe)  Weight Loss (Malnutrition): 5% in 1 month  Energy Intake (Malnutrition): less than 75% for greater than 7 days              Measurements:  Wt Readings from Last 1 Encounters:   04/25/22 69.5 kg (153 lb 3.5 oz)   Body mass index is 25.11 kg/m².    Recommendations: Recommendation/Intervention: 1. Continue current Regular diet, add [employer] ONS to aid in caloric intake. 2. RD to monitor & follow-up.  Goals: Meet % EEN, EPN by RD f/u date    Patient has been screened and  assessed by RD. RD will follow patient.      Controlled type 2 diabetes mellitus without complication, without long-term current use of insulin  Will hold home po diabetic medications  SSI  Goal bg 140-180    Adjustment disorder with mixed anxiety and depressed mood  - Cont celexa    Essential hypertension  Home medications include lisinopril/hctz and coreg  Coreg initiated  BP elevated and home lisinopril/hctz retstarted    CAD (coronary artery disease)  - S/P left carotid endarterectomy prior to cancer dx, s/p PCI (3 stents) >20 years ago   - Continue on ASA 81mg daily, platelets are wnl  - Initiation of statin?      VTE Risk Mitigation (From admission, onward)         Ordered     IP VTE HIGH RISK PATIENT  Once         04/23/22 1844     Place sequential compression device  Until discontinued         04/23/22 1844                Disposition: plan for discharge home tomorrow with HH if able to wean O2    Beatriz King NP  Bone Marrow Transplant  Burak Cordova - Oncology ([facility])

## 2022-04-26 NOTE — ASSESSMENT & PLAN NOTE
- Pt with 2 weeks of productive cough, nasal congestion  - Upon arrival to the hospial she was hypoxic requiring 3L NC, unable to wean O2 today  - CXR with likely develop consolidation  - CTA shows New patchy consolidative opacities in the in the right upper and posterior right lower lobes.  Findings suggestive infectious etiology such as pneumonia, multifocal pneumonia.  Also consider atypical given patient's reported immunocompromised status.  Aspiration could present similarly.  Consider continued follow-up after acute clinical illness has resolved to ensure resolution. New mediastinal and bilateral hilar lymphadenopathy, likely reactive.  - cont Rocephin and Azithro for CAP coverage (qtc stable)

## 2022-04-26 NOTE — PLAN OF CARE
POC reviewed with patient. Falls and safety precautions in place. Pt SBA with weak but steady gait. Pt remained afebrile overnight. No signs or symptoms of active bleeding. No complaints of pain at this time. Monitoring morning labs, will replete electrolytes as needed. Pt resting comfortably in bed. Will continue to monitor.

## 2022-04-26 NOTE — ASSESSMENT & PLAN NOTE
- Primary oncologist, Dr. 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 Developed allergy and was then desensitized. Soon after developed pancytopenia and Revlimid was stopped June 2017.    - BMBX on 6/8/17 was with normal cytogenetics. 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 Of 20 metaphases, 5 were normal and 15 had a 5q deletion, NGS positive for SF3B1 and TP53 (12%). 1.4% blasts  - Received vidaza for 22 cycles, received Decitabine for C23 due to national shortage of vidaza and then back to vidaza with C24.  - completed cycle 39 on 4/8/22

## 2022-04-26 NOTE — PROGRESS NOTES
Susan Puente is a 71 y.o.   female patient, who presents for a 6 minute walk test ordered by Dr. Dick  .  The diagnosis is community acquired pneumonia  .  The patient's BMI is 25.1kg/m2. Predicted distance (lower limit of normal) is 307.45 meters.    Test Results:    The test was performed  .  The patient is currently on 3L NC with an oxygen saturation of 95% at rest. Oxygen was removed and patient sat at the edge of the bed. Oxygen saturation was 90% on room air. Pt stood up and ambulated less than five feet and oxygen saturation went to 85% on room air. The patient reported feeling SOB during the test.    04/26/2022---------Distance:   < 5 feet.      O2 Sat % Supplemental Oxygen Heart Rate Blood Pressure Vida Scale   Pre-exercise  (Resting)  95%  3L NC   76       During Exercise  85%  room air   87       Post-exercise    93%   3L NC   82           Oxygen Qualification:     O2 Sat % Supplemental Oxygen Heart Rate Blood Pressure Vida Scale   Pre-exercise  (Resting)  95%   3L NC   76         During Exercise   85%   room air    87         Post-exercise     93%   3L NC    82           Recovery Time:   It took 2 minutes for the pt to go from 85% to 93%.    Performing nurse/tech:  Cindy Badillo RN     CLINICAL INTERPRETATION:   The patient experienced Dyspnea while the test was being performed. The pt was able to ambulate less than 5 feet before oxygen decreased.

## 2022-04-26 NOTE — PLAN OF CARE
Burak Garcia - Oncology ([facility])      HOME HEALTH ORDERS  FACE TO FACE ENCOUNTER    Patient Name: Susan Puente  YOB: 1950    PCP: Claudia Rapp MD   PCP Address: [address] / [place] [address]  PCP Phone Number: [phone]  PCP Fax: [phone]    Encounter Date: 4/23/22    Admit to Home Health    Diagnoses:  Active Hospital Problems    Diagnosis  POA    *CAP (community acquired pneumonia) [J18.9]  Yes    Moderate malnutrition [E44.0]  Yes    Controlled type 2 diabetes mellitus without complication, without long-term current use of insulin [E11.9]  Yes    MDS (myelodysplastic syndrome) [D46.9]  Yes    Adjustment disorder with mixed anxiety and depressed mood [F43.23]  Yes    Essential hypertension [I10]  Yes    Anemia requiring transfusions [D64.9]  Yes    CAD (coronary artery disease) [I25.10]  Yes      Resolved Hospital Problems   No resolved problems to display.       Follow Up Appointments:  Future Appointments   Date Time Provider Department Center   4/28/2022 10:30 AM [facility]   4/29/2022 11:00 AM NURSE 11, [facility]   5/2/2022  1:30 PM [facility]   5/2/2022  2:30 PM Gita Valdes MD [facility] HC BMT [facility]   5/2/2022  3:00 PM INJECTION, [facility] IN[facility]USION [facility]   5/3/2022 10:00 AM CHAIR 6, [facility] BMT INF [facility] BMT INF [facility] BMT   5/4/2022 10:30 AM Christine McDaniel, PA-C [facility] DM Virtual   5/4/2022  1:00 PM INJECTION, [facility] INFUSION [facility]   5/5/2022 10:30 AM [facility] BMT [facility]   5/5/2022 11:15 AM INJECTION, [facility] INFUSION [facility]   5/6/2022 11:00 AM NURSE 11, [facility]   5/9/2022 10:30 AM [facility] BMT [facility]   5/10/2022 11:00 AM NURSE 11, [facility] CHEMO [facility] CHEMO [facility]   5/12/2022 10:30 AM [facility] LAB BMT [facility] LAB[facility]MT [facility]   5/13/2022 11:00 AM NURSE 11, [facility] CHEMO  [facility]   5/16/2022 10:30 AM [facility] BMT [facility]   5/17/2022 11:00 AM NURSE 11, [facility]   5/19/2022 10:30 AM [facility] BMT [facility]   5/20/2022 11:00 AM NURSE 11, [facility]MO [facility]   5/23/2022 10:30 AM [facility] BMT [facility]   5/24/2022 11:00 AM NURSE 11, [facility]   5/26/2022 10:30 AM [facility] BMT [facility]   5/27/2022 11:00 AM NURSE 11, [facility]   5/30/2022 10:30 AM [facility] BMT [facility] LAB[facility]   5/31/2022 11:00 AM NURSE 11, [facility]MO [facility]   6/2/2022 10:30 AM [facility] BMT [facility] LAB[facility]   6/3/2022 11:00 AM NURSE 11, [facility]       Allergies:  Review of patient's allergies indicates:   Allergen Reactions    Revlimid [lenalidomide] Swelling     Facial swelling  6/8/17 - in the process of desensitation       Medications: Review discharge medications with patient and family and provide education.    Current Facility-Administered Medications   Medication Dose Route Frequency Provider Last Rate Last Admin    0.9%  NaCl infusion (for blood administration)   Intravenous Q24H PRN Beatriz King NP        0.9%  NaCl infusion (for blood administration)   Intravenous Q24H PRN Altagracia Cornejo NP        acetaminophen tablet 650 mg  650 mg Oral Q6H PRN Beatriz King NP   650 mg at 04/25/22 1732    acyclovir capsule 400 mg  400 mg Oral BID Harshal Martinez MD   400 mg at 04/26/22 0901    albuterol-ipratropium 2.5 mg-0.5 mg/3 mL nebulizer solution 3 mL  3 mL Nebulization Q4H WAKE Harshal Martinez MD   3 mL at 04/26/22 0738    aluminum-magnesium hydroxide-simethicone 200-200-20 mg/5 mL suspension 30 mL  30 mL Oral QID (AC & HS) Harshal Martinez MD   30 mL at 04/26/22 0632    aspirin EC tablet 81 mg  81 mg Oral Daily Harshal Martinez MD   81 mg at 04/26/22 0901     carvediloL tablet 12.5 mg  12.5 mg Oral BID  Harshal Martinez MD   12.5 mg at 04/26/22 0902    cefTRIAXone (ROCEPHIN) 2 g/50 mL D5W IVPB  2 g Intravenous Q24H Harshal Martinez MD   Stopped at 04/26/22 0202    citalopram tablet 20 mg  20 mg Oral Daily Harshal Martinez MD   20 mg at 04/26/22 0901    hydroCHLOROthiazide tablet 25 mg  25 mg Oral Daily Beatriz King NP   25 mg at 04/26/22 0902    levoFLOXacin tablet 750 mg  750 mg Oral Daily Altagracia Cornejo NP        lisinopriL tablet 40 mg  40 mg Oral Daily Beatriz King NP   40 mg at 04/26/22 0900    magnesium oxide tablet 400 mg  400 mg Oral Q4H PRN Steven Pedraza MD   400 mg at 04/24/22 0853    magnesium oxide tablet 400 mg  400 mg Oral Q4H PRN Steven Pedraza MD   400 mg at 04/24/22 1633    magnesium oxide tablet 800 mg  800 mg Oral Q4H PRN Steven Pedraza MD        melatonin tablet 6 mg  6 mg Oral Nightly PRN Harshal Martinez MD   6 mg at 04/24/22 0012    mupirocin 2 % ointment   Nasal BID Dash Dick MD   Given at 04/26/22 0902    potassium chloride SA CR tablet 20 mEq  20 mEq Oral PRN Steven Pedraza MD        potassium chloride SA CR tablet 20 mEq  20 mEq Oral Q2H PRN Steven Pedraza MD        potassium chloride SA CR tablet 20 mEq  20 mEq Oral Q2H PRN Steven Pedraza MD        potassium, sodium phosphates 280-160-250 mg packet 1 packet  1 packet Oral Q4H PRN Steven Pedraza MD        potassium, sodium phosphates 280-160-250 mg packet 2 packet  2 packet Oral Q4H PRN Steven Pedraza MD        sodium chloride 0.9% flush 10 mL  10 mL Intravenous PRN Harshal Martinez MD        sucralfate 100 mg/mL suspension 1 g  1 g Oral Q6H Harshal Martinez MD   1 g at 04/26/22 0632     Facility-Administered Medications Ordered in Other Encounters   Medication Dose Route Frequency Provider Last Rate Last Admin    diphenhydrAMINE capsule 25 mg  25 mg Oral PRN Gita Vlades MD         Current Discharge Medication List      CONTINUE these medications which  have NOT CHANGED    Details   UNABLE TO FIND Take 1 tablet by mouth once daily.      acetaminophen (TYLENOL) 650 MG TbSR Take 650 mg by mouth every 8 (eight) hours as needed (for pain).      acyclovir (ZOVIRAX) 400 MG tablet Take 1 tablet (400 mg total) by mouth 2 (two) times daily.  Qty: 60 tablet, Refills: 6    Associated Diagnoses: Pancytopenia      aspirin (ECOTRIN) 81 MG EC tablet Take 1 tablet (81 mg total) by mouth once daily.  Qty: 30 tablet, Refills: 11      !! azacitidine (VIDAZA INJ) Inject as directed every 28 days.      !! azaCITIDine (VIDAZA) 100 mg SolR chemo injection       carvediloL (COREG) 12.5 MG tablet Take 1 tablet (12.5 mg total) by mouth 2 (two) times daily with meals.  Qty: 120 tablet, Refills: 0    Comments: .      ciprofloxacin HCl (CIPRO) 500 MG tablet Take 1 tablet (500 mg total) by mouth 2 (two) times daily.  Qty: 60 tablet, Refills: 5    Associated Diagnoses: Myelodysplastic syndrome; Prophylactic antibiotic      citalopram (CELEXA) 20 MG tablet Take 1 tablet (20 mg total) by mouth once daily.  Qty: 30 tablet, Refills: 2    Associated Diagnoses: Depression, unspecified depression type      evolocumab (REPATHA SURECLICK) 140 mg/mL PnIj Inject 140mg (1 pen) into the skin every 2 weeks.  Qty: 6 mL, Refills: 3      fluconazole (DIFLUCAN) 200 MG Tab Take 2 tablets (400 mg total) by mouth once daily.  Qty: 60 tablet, Refills: 6    Associated Diagnoses: Pancytopenia      lancets 30 gauge Misc Use to test blood sugar daily  Qty: 100 each, Refills: 3    Associated Diagnoses: Controlled type 2 diabetes mellitus with stage 3 chronic kidney disease, without long-term current use of insulin      lisinopriL-hydrochlorothiazide (PRINZIDE,ZESTORETIC) 20-12.5 mg per tablet Take 2 tablets by mouth once daily.  Qty: 180 tablet, Refills: 3    Comments: .  Associated Diagnoses: Coronary artery disease, angina presence unspecified, unspecified vessel or lesion type, unspecified whether native or  transplanted heart      !! loratadine (CLARITIN) 10 mg tablet Take 10 mg by mouth daily as needed.       !! loratadine (CLARITIN) 10 mg tablet 1 tablet AS NEEDED (route: oral)      magnesium 30 mg Tab Take by mouth once.      magnesium oxide (MAGOX) 400 mg (241.3 mg magnesium) tablet Take 1 tablet (400 mg total) by mouth once daily.  Qty: 30 tablet, Refills: 11    Associated Diagnoses: MDS (myelodysplastic syndrome) with 5q deletion      melatonin 10 mg Tab Take 1 tablet (10 mg total) by mouth once daily.    Associated Diagnoses: Insomnia, unspecified type      metFORMIN (GLUCOPHAGE-XR) 500 MG ER 24hr tablet Take 1 tablet (500 mg total) by mouth daily with breakfast.  Qty: 90 tablet, Refills: 0    Associated Diagnoses: Controlled type 2 diabetes mellitus without complication, without long-term current use of insulin      MULTIVITAMIN ORAL Take by mouth.      pantoprazole (PROTONIX) 40 MG tablet Take 1 tablet (40 mg total) by mouth once daily.  Qty: 30 tablet, Refills: 3    Associated Diagnoses: Gastroesophageal reflux disease without esophagitis      potassium chloride SA (K-DUR,KLOR-CON) 20 MEQ tablet Take 1 tablet (20 mEq total) by mouth once daily.  Qty: 30 tablet, Refills: 4    Associated Diagnoses: Hypokalemia       !! - Potential duplicate medications found. Please discuss with provider.            I have seen and examined this patient within the last 30 days. My clinical findings that support the need for the home health skilled services and home bound status are the following:no   Weakness/numbness causing balance and gait disturbance due to Malignancy/Cancer making it taxing to leave home.     Diet:   regular diet    Referrals/ Consults  Physical Therapy to evaluate and treat. Evaluate for home safety and equipment needs; Establish/upgrade home exercise program. Perform / instruct on therapeutic exercises, gait training, transfer training, and Range of Motion.  Occupational Therapy to evaluate and treat.  Evaluate home environment for safety and equipment needs. Perform/Instruct on transfers, ADL training, ROM, and therapeutic exercises.    Activities:   activity as tolerated    Nursing:   Agency to admit patient within 24 hours of hospital discharge unless specified on physician order or at patient request    SN to complete comprehensive assessment including routine vital signs. Instruct on disease process and s/s of complications to report to MD. Review/verify medication list sent home with the patient at time of discharge  and instruct patient/caregiver as needed. Frequency may be adjusted depending on start of care date.     Skilled nurse to perform up to 3 visits PRN for symptoms related to diagnosis    Notify MD if SBP > 160 or < 90; DBP > 90 or < 50; HR > 120 or < 50; Temp > 100.4; O2 < 88%    Ok to schedule additional visits based on staff availability and patient request on consecutive days within the home health episode.    When multiple disciplines ordered:    Start of Care occurs on Sunday - Wednesday schedule remaining discipline evaluations as ordered on separate consecutive days following the start of care.    Thursday SOC -schedule subsequent evaluations Friday and Monday the following week.     Friday - Saturday SOC - schedule subsequent discipline evaluations on consecutive days starting Monday of the following week.    For all post-discharge communication and subsequent orders please contact patient's primary care physician. If unable to reach primary care physician or do not receive response within 30 minutes, please contact BMT clinic at [phone] for clinical staff order clarification    I certify that this patient is confined to her home and needs intermittent skilled nursing care, physical therapy and occupational therapy.

## 2022-04-26 NOTE — PLAN OF CARE
Pt involved in plan of care and communicating needs throughout shift. 1 unit PRBC given. Pt pre-medicated with Tylenol and Benadryl. Six minute walk test performed today.  Up in room and to bathroom with stand-by assist; no c/o pain. Tolerating diet, voiding without difficulty. All VSS; no acute events so far this shift.  Pt remaining free from falls or injury throughout shift; bed locked and in lowest position; call light within reach.  Pt instructed to call for assistance as needed.  Q1H rounding done on pt. WCTM.

## 2022-04-26 NOTE — NURSING
2040: Pt called this RN to the room stating that she felt like she wasn't getting any oxygen. Upon assessment, pt on 2L and pulse ox reading 86%. Oxygen bumped up to 3L and O2 returned to 93%. Will continue to monitor.

## 2022-04-26 NOTE — ASSESSMENT & PLAN NOTE
- S/P left carotid endarterectomy prior to cancer dx, s/p PCI (3 stents) >20 years ago   - Continue on ASA 81mg daily, platelets are wnl  - Initiation of statin?

## 2022-04-26 NOTE — ASSESSMENT & PLAN NOTE
Nutrition consulted. Most recent weight and BMI monitored-          Malnutrition (Moderate to Severe)  Weight Loss (Malnutrition): 5% in 1 month  Energy Intake (Malnutrition): less than 75% for greater than 7 days              Measurements:  Wt Readings from Last 1 Encounters:   04/25/22 69.5 kg (153 lb 3.5 oz)   Body mass index is 25.11 kg/m².    Recommendations: Recommendation/Intervention: 1. Continue current Regular diet, add [employer] ONS to aid in caloric intake. 2. RD to monitor & follow-up.  Goals: Meet % EEN, EPN by RD f/u date    Patient has been screened and assessed by RD. RD will follow patient.

## 2022-04-26 NOTE — PT/OT/SLP PROGRESS
Physical Therapy Treatment    Patient Name:  Susan Puente   MRN:  [id number]    Recommendations:     Discharge Recommendations:  home health PT   Discharge Equipment Recommendations: none   Barriers to discharge: None    Assessment:     Susan Puente is a 71 y.o. female admitted with a medical diagnosis of CAP (community acquired pneumonia).  She presents with the following impairments/functional limitations:  weakness, impaired endurance, impaired functional mobilty, gait instability, impaired balance.  Pt participated in education on this date, as pt politely declined functional mobility training/progression as pt receiving unit PRBC and requested to rest 2/2 fatigue at this time. Pt continues to state concerns re: possible d/c, therefore education time spent reviewing ambulation/walking program upon d/c, energy conservation techniques and pacing strategies as pt endorsed concern for SOB as she recovers from PNA, and RPE scale to utilize for further progression and self assessment of functional status/need for rest/recovery. Pt deferred stair ambulation at this time, endorsed no concerns regarding stairs prior to likely d/c home prior to further PT intervention. Pt continues to present below their independent prior functional baseline. Pt would benefit from continued, skilled PT while in house to address the above listed impairments, further progress mobility as able, return towards highest level of function.      Rehab Prognosis: Good; patient continues to benefit from acute skilled PT services to address these deficits and reach maximum level of function.  Patient remains most appropriate to discharge to home health PT  Recent Surgery: * No surgery found *      Plan:     During this hospitalization, patient to be seen 3 x/week to address the identified rehab impairments via gait training, therapeutic activities, therapeutic exercises, neuromuscular re-education and progress toward the following  "goals:    · Plan of Care Expires:  22    Subjective     Subjective: "I just don't know how long it's going to take to recover from this pneumonia!"   Pain/Comfort:   · Pain Rating 1: 0/10      Objective:     Communicated with RN prior to session.  Patient found supine with peripheral IV, pulse ox (continuous), telemetry, oxygen upon PT entry to room.      General Precautions: Standard, fall   Orthopedic Precautions:N/A   Braces: N/A     Functional Mobility: pt politely deferred functional mobility 2/2 fatigue and receiving PRBC      AM-PAC 6 CLICK MOBILITY  Turning over in bed (including adjusting bedclothes, sheets and blankets)?: 4  Sitting down on and standing up from a chair with arms (e.g., wheelchair, bedside commode, etc.): 4  Moving from lying on back to sitting on the side of the bed?: 4  Moving to and from a bed to a chair (including a wheelchair)?: 3  Need to walk in hospital room?: 3  Climbing 3-5 steps with a railing?: 3  Basic Mobility Total Score: 21       Education:  Education provided re: d/c planning, PT POC, role of HHPT. Pt expressing concerns re: reduced activity tolerance and SOB, education re: use of pulse ox to monitor oxygen status, pacing strategies, energy conservation techniques, and use of RPE scale to gauge current endurance/need for recovery. Reviewed ambulation program to also improve activity tolerance upon d/c. Pt endorsed understanding.     Patient left supine with all lines intact and call button in reach.    GOALS:   Multidisciplinary Problems     Physical Therapy Goals        Problem: Physical Therapy    Goal Priority Disciplines Outcome Goal Variances Interventions   Physical Therapy Goal     PT, PT/OT Ongoing, Progressing     Description: Goals to be met by: 22    Patient will increase functional independence with mobility by performin. Pt will perform supine<>sit with independence to improve independence with mobility  2. Pt will perform functional transfers " with independence   3. Pt will ambulate >150 ft feet with LRAD and supervision to safely perform household distance ambulation  4. Pt will ascend/descend 5 stairs with supervision to safely manage within home.                    Time Tracking:     PT Received On: 04/26/22  PT Start Time: 1245     PT Stop Time: 1256  PT Total Time (min): 10 min     Billable Minutes: Therapeutic Activity 10 min    Treatment Type: Treatment  PT/PTA: PT           Eden Meade PT, DPT  4/26/2022

## 2022-04-26 NOTE — ASSESSMENT & PLAN NOTE
- Pt with 2 weeks of productive cough, nasal congestion  - Upon arrival to the hospial she was hypoxic requiring 3L NC, unable to wean O2 today  - CXR with likely develop consolidation  - CTA shows New patchy consolidative opacities in the in the right upper and posterior right lower lobes.  Findings suggestive infectious etiology such as pneumonia, multifocal pneumonia.  Also consider atypical given patient's reported immunocompromised status.  Aspiration could present similarly.  Consider continued follow-up after acute clinical illness has resolved to ensure resolution. New mediastinal and bilateral hilar lymphadenopathy, likely reactive.  - completed azithromycin.

## 2022-04-26 NOTE — ASSESSMENT & PLAN NOTE
Patient with Hypoxic Respiratory failure which is Acute.  she is not on home oxygen. Supplemental oxygen was provided and noted-  .   Signs/symptoms of respiratory failure include- tachypnea and increased work of breathing. Contributing diagnoses includes - ARDS Labs and images were reviewed. Patient Has not had a recent ABG. Will treat underlying causes and adjust management of respiratory failure as follows- will perform six minute walk test to determine if patient qualifies for home O2.

## 2022-04-26 NOTE — ASSESSMENT & PLAN NOTE
Home medications include lisinopril/hctz and coreg  Coreg initiated  BP elevated and home lisinopril/hctz retstarted

## 2022-04-26 NOTE — SUBJECTIVE & OBJECTIVE
Subjective:     Interval History: admitted with cough and R sided chest pain. D-dimer elevated, CTA done revealing no PE but RUL and RLL PNA. T.max 100.2. on Rocephin and Zithromax. Remains on O2 at 2L. Transfusing 1 unit PRBC today. PT/OT eval recommending HH. She has no complaints today.     Objective:     Vital Signs (Most Recent):  Temp: 97.4 °F (36.3 °C) (04/25/22 1230)  Pulse: 99 (04/25/22 1922)  Resp: 18 (04/25/22 1526)  BP: (Abnormal) 137/58 (04/25/22 1230)  SpO2: (Abnormal) 90 % (04/25/22 1526)   Vital Signs (24h Range):  Temp:  [97.4 °F (36.3 °C)-99.3 °F (37.4 °C)] 97.4 °F (36.3 °C)  Pulse:  [74-99] 99  Resp:  [16-18] 18  SpO2:  [88 %-99 %] 90 %  BP: (137-176)/(58-71) 137/58     Weight: 69.5 kg (153 lb 3.5 oz)  Body mass index is 25.11 kg/m².  Body surface area is 1.79 meters squared.    ECOG SCORE             Intake/Output - Last 3 Shifts       Intake/Output Category 04/23 0700 to 04/24 0659 04/24 0700 to 04/25 0659 04/25 0700 to 04/26 0659    P.O. 120 140 480    IV Piggyback 250      Total Intake(mL/kg) 370 (5.3) 140 (2) 480 (6.9)    Urine (mL/kg/hr) 300 500 (0.3)     Emesis/NG output  200     Stool 0 100     Total Output 300 800     Net +70 -660 +480           Urine Occurrence  1 x     Stool Occurrence 1 x 2 x             Physical Exam  Vitals and nursing note reviewed.   Constitutional:       General: She is not in acute distress.     Appearance: Normal appearance. She is well-developed. She is not diaphoretic.   HENT:      Head: Normocephalic and atraumatic.   Eyes:      General: No scleral icterus.     Conjunctiva/sclera: Conjunctivae normal.   Neck:      Vascular: No JVD.      Trachea: No tracheal deviation.   Cardiovascular:      Rate and Rhythm: Normal rate and regular rhythm.      Heart sounds:     No friction rub. No gallop.   Pulmonary:      Effort: Pulmonary effort is normal. No respiratory distress.      Breath sounds: Normal breath sounds. No wheezing.      Comments: Course breath sounds  jonh lung fields  Abdominal:      General: Bowel sounds are normal. There is no distension.      Palpations: Abdomen is soft. There is no mass.   Musculoskeletal:         General: No deformity. Normal range of motion.      Cervical back: Normal range of motion and neck supple.   Lymphadenopathy:      Cervical: No cervical adenopathy.   Skin:     General: Skin is warm and dry.      Coloration: Skin is not pale.   Neurological:      Mental Status: She is alert.       Significant Labs:   CBC:   Recent Labs   Lab 04/24/22  0339 04/25/22  0847   WBC 2.63* 1.83*   HGB 7.4* 6.9*   HCT 22.1* 20.9*   PLT 86* 86*   , CMP:   Recent Labs   Lab 04/24/22  0339 04/25/22  0847   * 135*   K 4.0 3.9   CL 98 97   CO2 28 29   * 105   BUN 13 11   CREATININE 0.7 0.7   CALCIUM 8.9 9.1   PROT 6.1 6.1   ALBUMIN 2.5* 2.4*   BILITOT 0.5 0.3   ALKPHOS 54* 54*   AST 19 18   ALT 28 27   ANIONGAP 7* 9   EGFRNONAA >60.0 >60.0   , and Reticulocytes: No results for input(s): RETIC in the last 48 hours.    Diagnostic Results:  I have reviewed and interpreted all pertinent imaging results/findings within the past 24 hours.

## 2022-04-27 NOTE — PROGRESS NOTES
Susan Puente is a 71 y.o.   female patient, who presents for a 6 minute walk test ordered by Dr. Dick  .  The diagnosis is Community Acquired Pneumonia  .  The patient's BMI is 25.1 kg/m2.     Test Results:      The total time walked was 60 seconds.  During walking, the patient reported: fatigue and shortness of breath.                                  4/27/2022 ---------Distance: 5 feet.     O2 Sat % Supplemental Oxygen Heart Rate Blood Pressure Vida Scale   Pre-exercise  (Resting)  90%  room air   78   127/54 mmHg     During Exercise  86%  room air   97   mmHg     Post-exercise  (Recovery)  93%   2L NC   84   137/69mmHg       Recovery Time:   After putting the patient on 2L NC it took 2 minutes for the patient to recover.     Performing nurse/tech:   Cindy Badillo RN       CLINICAL INTERPRETATION:  Six minute walk distance is about five feet with the patient complaining of dyspnea. Patient's oxygen saturation decreases when taken off of NC. Patient's oxygen saturation was in the 80% range when ambulating on room air. Patient;s oxygen saturation increased with use of NC.

## 2022-04-27 NOTE — ASSESSMENT & PLAN NOTE
- Pt with 2 weeks of productive cough, nasal congestion  - Upon arrival to the hospial she was hypoxic requiring 3L NC, unable to wean O2 today  - CXR with likely develop consolidation  - CTA shows New patchy consolidative opacities in the in the right upper and posterior right lower lobes.  Findings suggestive infectious etiology such as pneumonia, multifocal pneumonia.  Also consider atypical given patient's reported immunocompromised status.  Aspiration could present similarly.  Consider continued follow-up after acute clinical illness has resolved to ensure resolution. New mediastinal and bilateral hilar lymphadenopathy, likely reactive.  - completed azithromycin.   - changed cefepime to oral LVQ on 4/26. Will complete a 7 day course of abx on 4/30.

## 2022-04-27 NOTE — PLAN OF CARE
Patient tolerated session well, with pleasant demeanor and also communicated anxiety and feelings of unreadiness with hospital discharge plan.  Patient able to complete bed mobility with SBA, transfers with SBA and RW, ADLs at bedside and in bathroom with SBA to min A, and functional mobility with RW with SBA to CGA with light sway when walking, with therapist myke O2 tank and assisting with line navigation.     Problem: Occupational Therapy  Goal: Occupational Therapy Goal  Description: Goals to be met by: 5/15/22     Patient will increase functional independence with ADLs by performing:    Grooming while standing at sink with Supervision.  Toileting from toilet with Modified [place] for hygiene and clothing management.   Supine to sit with Set-up Assistance.  Step transfer with Supervision    Outcome: Ongoing, Progressing

## 2022-04-27 NOTE — PT/OT/SLP PROGRESS
"Occupational Therapy   Treatment    Name: Susan Puente  MRN: [id number]  Admitting Diagnosis:  CAP (community acquired pneumonia)       Recommendations:     Discharge Recommendations: home health OT  Discharge Equipment Recommendations:  walker, rolling, oxygen  Barriers to discharge:  Other (Comment) (needs increased assistance)    Assessment:     Susan Puente is a 71 y.o. female with a medical diagnosis of CAP (community acquired pneumonia). Patient tolerated session well, with pleasant demeanor and also communicated anxiety and feelings of unreadiness with hospital discharge plan.  Patient able to complete bed mobility with SBA, transfers with SBA and RW, ADLs at bedside and in bathroom with SBA to min A, and functional mobility with RW with SBA to CGA with light sway when walking, with therapist myke O2 tank and assisting with line navigation.     She presents with performance deficits affecting function are weakness, impaired endurance, impaired functional mobilty, gait instability, impaired balance.     Rehab Prognosis:  Good; patient would benefit from acute skilled OT services to address these deficits and reach maximum level of function.       Plan:     Patient to be seen 3 x/week to address the above listed problems via self-care/home management  · Plan of Care Expires: 05/15/22  · Plan of Care Reviewed with: patient    Subjective     "I'm just so tired but I do know I have to get out of this bed"     Pain/Comfort:  · Pain Rating 1: not rated  · Location - Side 1: Bilateral  · Location - Orientation 1: lower  · Location 1: foot  · Pain Addressed 1: Reposition, Distraction  · Pain Rating Post-Intervention 1:not rated    Objective:     Communicated with: ALIE White prior to session.  Patient found supine with peripheral IV, pulse ox (continuous), telemetry, oxygen upon OT entry to room.    General Precautions: Standard, fall   Orthopedic Precautions:N/A   Braces: N/A  Respiratory Status: Nasal " cannula, flow 2 L/min     Occupational Performance:     Bed Mobility:    · Patient completed Rolling/Turning to Left with  stand by assistance  · Patient completed Rolling/Turning to Right with stand by assistance  · Patient completed Scooting/Bridging with stand by assistance  · Patient completed Supine to Sit with stand by assistance  · Patient completed Sit to Supine with stand by assistance     Functional Mobility/Transfers:  · Patient completed Sit <> Stand Transfer across 3 trials  · 1st trial from EOB: with stand by assistance   · 2nd trial: from EOB: with SBA and RW  · 3rd trial: to/from toilet with SBA and RW  · 4th trial: to/from upright chair with contact guard assistance and rolling walker   · Patient completed sustained stand at sink approx 4min with good balance, requiring cues to place walker at base of sink   · Patient completed sustained sit @ EOB approx 10min with good dynamic and static balance  · Functional Mobility: Pt engaging in functional mobility to simulate household/community distances utilizing RW and SBA, and assistance myke O2 and navigating lines in order to maximize functional activity tolerance and standing balance required for engagement in occupations of choice.       Activities of Daily Living:  · Grooming: supervision standing at sink, washing hands  · Lower Body Dressing: set up assistance preparing incontinence pad, doffing soiled briefs, donning fresh briefs  · Upper Body Dressing: min A doffing soiled hospital gown and donning fresh hospital gown   · Toileting: modified independence toileting and completing samanta-care with therapist providing safety check-ins      [facility] 6 Click ADL: 18    Treatment & Education:   Pt educated on role of OT, POC, and goals for therapy.     POC was dicussed with patient/caregiver, who was included in its development and is in agreement with the identified goals and treatment plan.    Patient and family aware of patient's deficits and therapy  progression.    Time provided for therapeutic counseling and discussion of health disposition.    Educated on importance of EOB/OOB mobility, maintaining routine, sitting up in chair, and maximizing independence with ADLs during admission    Pt completed ADLs and functional mobility for treatment session as noted above    Pt/caregiver verbalized understanding and expressed no further concerns/questions.   Updated communication board with level of assist required      Patient left up in chair with all lines intact, call button in reach and RN notifiedEducation:      GOALS:   Multidisciplinary Problems     Occupational Therapy Goals        Problem: Occupational Therapy    Goal Priority Disciplines Outcome Interventions   Occupational Therapy Goal     OT, PT/OT Ongoing, Progressing    Description: Goals to be met by: 5/15/22     Patient will increase functional independence with ADLs by performing:    Grooming while standing at sink with Supervision.  Toileting from toilet with Modified [place] for hygiene and clothing management.   Supine to sit with Set-up Assistance.  Step transfer with Supervision                     Time Tracking:     OT Date of Treatment: 04/27/22  OT Start Time: 1307  OT Stop Time: 1345  OT Total Time (min): 38 min    Billable Minutes:Self Care/Home Management 15  Therapeutic Activity 23    OT/ADRYAN: OT          4/27/2022

## 2022-04-27 NOTE — PLAN OF CARE
Pt discharged to home per MD order. Discharge instructions and prescriptions given and explained to pt. Port de-accessed - site clean, dry, and intact.  Pt ambulating in room with stand by assist ; tolerating regular diet; voiding without difficulty; pain well-controlled with PO pain meds.  All VSS; O2 sats WNL. Home oxygen delivered  to room and set up for pt's house.  set up for home transportation. Transportation never showed. Contacted on-call . Trying to set up new transportation. SHANNAN.

## 2022-04-27 NOTE — PROGRESS NOTES
[facility] ([facility])  Hematology  Bone Marrow Transplant  Progress Note    Patient Name: Susan Puente  Admission Date: 4/23/2022  Hospital Length of Stay: 4 days  Code Status: DNR    Subjective:     Interval History: Remains afebrile off IV abx. Continuing oral abx through 4/30 for CAP treatment. Today is day 4 of 7 of treatment. Plan is to discharge home with home health pending home oxygen approval.      Objective:     Vital Signs (Most Recent):  Temp: 98.3 °F (36.8 °C) (04/27/22 0803)  Pulse: 92 (04/27/22 0815)  Resp: 18 (04/27/22 0815)  BP: (!) 174/74 (04/27/22 0803)  SpO2: 96 % (04/27/22 0815)   Vital Signs (24h Range):  Temp:  [97.7 °F (36.5 °C)-98.8 °F (37.1 °C)] 98.3 °F (36.8 °C)  Pulse:  [] 92  Resp:  [17-20] 18  SpO2:  [90 %-97 %] 96 %  BP: (133-188)/(65-85) 174/74     Weight: 69.5 kg (153 lb 3.5 oz)  Body mass index is 25.11 kg/m².  Body surface area is 1.79 meters squared.    ECOG SCORE             Intake/Output - Last 3 Shifts         04/25 0700  04/26 0659 04/26 0700  04/27 0659 04/27 0700  04/28 0659    P.O. 960 1080     Blood  700     IV Piggyback 99.2      Total Intake(mL/kg) 1059.2 (15.2) 1780 (25.6)     Urine (mL/kg/hr)       Emesis/NG output       Stool       Total Output       Net +1059.2 +1780            Urine Occurrence  3 x     Stool Occurrence  1 x             Physical Exam  Vitals and nursing note reviewed.   Constitutional:       General: She is not in acute distress.     Appearance: Normal appearance. She is well-developed. She is not diaphoretic.   HENT:      Head: Normocephalic and atraumatic.   Eyes:      General: No scleral icterus.     Conjunctiva/sclera: Conjunctivae normal.   Neck:      Vascular: No JVD.      Trachea: No tracheal deviation.   Cardiovascular:      Rate and Rhythm: Normal rate and regular rhythm.      Heart sounds:     No friction rub. No gallop.   Pulmonary:      Effort: No respiratory distress.      Breath sounds: No wheezing.      Comments:  Course breath sounds jonh lung fields  Abdominal:      General: Bowel sounds are normal. There is no distension.      Palpations: Abdomen is soft. There is no mass.   Musculoskeletal:         General: No deformity. Normal range of motion.      Cervical back: Normal range of motion and neck supple.   Lymphadenopathy:      Cervical: No cervical adenopathy.   Skin:     General: Skin is warm and dry.      Coloration: Skin is not pale.   Neurological:      Mental Status: She is alert.       Significant Labs:   CBC:   Recent Labs   Lab 04/26/22 0323 04/27/22  0419   WBC 2.49* 2.26*   HGB 6.9* 8.7*   HCT 20.9* 26.6*   * 141*     , CMP:   Recent Labs   Lab 04/26/22 0323 04/27/22 0419   * 135*   K 3.7 3.5   CL 93* 93*   CO2 30* 32*   * 98   BUN 11 14   CREATININE 0.7 0.7   CALCIUM 8.8 8.7   PROT 5.9* 5.9*   ALBUMIN 2.5* 2.5*   BILITOT 0.2 0.6   ALKPHOS 65 62   AST 21 17   ALT 30 28   ANIONGAP 10 10   EGFRNONAA >60.0 >60.0     , and Reticulocytes: No results for input(s): RETIC in the last 48 hours.    Diagnostic Results:  I have reviewed and interpreted all pertinent imaging results/findings within the past 24 hours.    Assessment/Plan:     * CAP (community acquired pneumonia)  - Pt with 2 weeks of productive cough, nasal congestion  - Upon arrival to the hospial she was hypoxic requiring 3L NC, unable to wean O2 today  - CXR with likely develop consolidation  - CTA shows New patchy consolidative opacities in the in the right upper and posterior right lower lobes.  Findings suggestive infectious etiology such as pneumonia, multifocal pneumonia.  Also consider atypical given patient's reported immunocompromised status.  Aspiration could present similarly.  Consider continued follow-up after acute clinical illness has resolved to ensure resolution. New mediastinal and bilateral hilar lymphadenopathy, likely reactive.  - completed azithromycin.   - changed cefepime to oral LVQ on 4/26. Will complete a 7  day course of abx on 4/30.    Acute respiratory failure with hypoxia  Patient with Hypoxic Respiratory failure which is Acute.  she is not on home oxygen. Supplemental oxygen was provided and noted-  .   Signs/symptoms of respiratory failure include- tachypnea and increased work of breathing. Contributing diagnoses includes - ARDS Labs and images were reviewed. Patient Has not had a recent ABG. Will treat underlying causes and adjust management of respiratory failure as follows- will perform six minute walk test to determine if patient qualifies for home O2.    MDS (myelodysplastic syndrome)  - Primary oncologist, Dr. 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 Developed allergy and was then desensitized. Soon after developed pancytopenia and Revlimid was stopped June 2017.    - BMBX on 6/8/17 was with normal cytogenetics. 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 Of 20 metaphases, 5 were normal and 15 had a 5q deletion, NGS positive for SF3B1 and TP53 (12%). 1.4% blasts  - Received vidaza for 22 cycles, received Decitabine for C23 due to national shortage of vidaza and then back to vidaza with C24.  - completed cycle 39 on 4/8/22    Pancytopenia due to antineoplastic chemotherapy  Transfuse for hgb < 7 or plts < 10K  Daily cbc while inpatient  Continue acyclovir ppx    Moderate malnutrition  Nutrition consulted. Most recent weight and BMI monitored-     Malnutrition (Moderate to Severe)  Weight Loss  (Malnutrition): 5% in 1 month  Energy Intake (Malnutrition): less than 75% for greater than 7 days    Measurements:  Wt Readings from Last 1 Encounters:   04/25/22 69.5 kg (153 lb 3.5 oz)   Body mass index is 25.11 kg/m².    Recommendations: Recommendation/Intervention: 1. Continue current Regular diet, add [employer] ONS to aid in caloric intake. 2. RD to monitor & follow-up.  Goals: Meet % EEN, EPN by RD f/u date    Patient has been screened and assessed by RD. RD will follow patient.      Controlled type 2 diabetes mellitus without complication, without long-term current use of insulin  Will hold home po diabetic medications  SSI  Goal bg 140-180    Adjustment disorder with mixed anxiety and depressed mood  - Cont celexa    Essential hypertension  Home medications include lisinopril/hctz and coreg  Coreg initiated  BP elevated and home lisinopril/hctz retstarted    Anemia requiring transfusions  - 2/2 MDS  - hgb up to 8.7 today  - Will transfuse for hgb <7    CAD (coronary artery disease)  - S/P left carotid endarterectomy prior to cancer dx, s/p PCI (3 stents) >20 years ago   - Continue on ASA 81mg daily, platelets are wnl  - Initiation of statin?        VTE Risk Mitigation (From admission, onward)         Ordered     IP VTE HIGH RISK PATIENT  Once         04/23/22 1844     Place sequential compression device  Until discontinued         04/23/22 1844                Disposition: Inpatient.    Altagracia Cornejo, NP  Bone Marrow Transplant  [facility] ([facility])

## 2022-04-28 NOTE — TELEPHONE ENCOUNTER
"----- Message from James Thompson sent at 4/28/2022  1:28 PM CDT -----  Consult/Advisory:          Name Of Caller: Self      Contact Preference?: [phone] (home)         Provider Name: Lucio      Does patient feel the need to be seen today? No      What is the nature of the call?: Calling to speak w/ nurse about rehab inquiries          Additional Notes:  "Thank you for all that you do for our patients"      "

## 2022-04-28 NOTE — TELEPHONE ENCOUNTER
Specialty Pharmacy - Initial Clinical Assessment    Specialty Medication Orders Linked to Encounter    Flowsheet Row Most Recent Value   Medication #1 deferasirox (EXJADE) 500 MG disintegrating tablet (Order#[id number], Rx#[id number])        Patient Diagnosis   D46.9 - Myelodysplastic syndrome    Subjective    Susan Puente is a 71 y.o. female, who is followed by the specialty pharmacy service for management and education.    Recent Encounters     Date Type Provider Description    04/28/2022 Specialty Pharmacy Shanthi Saravia PharmD Initial Clinical Assessment    04/28/2022 Specialty Pharmacy Marisela Francois Referral Authorization    03/16/2022 Specialty Pharmacy Marisela Francois Refill Coordination    03/07/2022 Specialty Pharmacy Noemi Manriquez PharmD Refill Coordination; Referral Authorization    02/08/2022 Specialty Pharmacy Katrin Maciel PharmD Refill Coordination; Referral Authorization        Clinical call attempts since last clinical assessment   No call attempts found.     Current Outpatient Medications   Medication Sig    acyclovir (ZOVIRAX) 400 MG tablet Take 1 tablet (400 mg total) by mouth 2 (two) times daily.    aspirin (ECOTRIN) 81 MG EC tablet Take 1 tablet (81 mg total) by mouth once daily.    azacitidine (VIDAZA INJ) Inject as directed every 28 days.    azaCITIDine (VIDAZA) 100 mg SolR chemo injection     carvediloL (COREG) 12.5 MG tablet Take 1 tablet (12.5 mg total) by mouth 2 (two) times daily with meals.    [START ON 5/1/2022] ciprofloxacin HCl (CIPRO) 500 MG tablet Take 1 tablet (500 mg total) by mouth 2 (two) times daily.    citalopram (CELEXA) 20 MG tablet Take 1 tablet (20 mg total) by mouth once daily.    deferasirox (EXJADE) 500 MG disintegrating tablet Take 3 tablets (1,500 mg total) by mouth before breakfast.    evolocumab (REPATHA SURECLICK) 140 mg/mL PnIj Inject 140mg (1 pen) into the skin every 2 weeks. (Patient taking differently: Inject 140mg (1 pen) into the skin  every 2 weeks.)    fluconazole (DIFLUCAN) 200 MG Tab Take 2 tablets (400 mg total) by mouth once daily.    lancets 30 gauge Misc Use to test blood sugar daily (Patient taking differently: Use to test blood sugar daily)    levoFLOXacin (LEVAQUIN) 750 MG tablet Take 1 tablet (750 mg total) by mouth once daily. for 3 days    lisinopriL-hydrochlorothiazide (PRINZIDE,ZESTORETIC) 20-12.5 mg per tablet Take 2 tablets by mouth once daily.    loratadine (CLARITIN) 10 mg tablet Take 10 mg by mouth daily as needed.     magnesium oxide (MAGOX) 400 mg (241.3 mg magnesium) tablet Take 1 tablet (400 mg total) by mouth once daily.    melatonin 10 mg Tab Take 1 tablet (10 mg total) by mouth once daily.    metFORMIN (GLUCOPHAGE-XR) 500 MG ER 24hr tablet Take 1 tablet (500 mg total) by mouth daily with breakfast. (Patient not taking: Reported on 4/4/2022)    MULTIVITAMIN ORAL Take by mouth.    pantoprazole (PROTONIX) 40 MG tablet Take 1 tablet (40 mg total) by mouth once daily.    potassium chloride SA (K-DUR,KLOR-CON) 20 MEQ tablet Take 1 tablet (20 mEq total) by mouth once daily.   Last reviewed on 4/23/2022  3:46 PM by Toro Pop RN    Review of patient's allergies indicates:   Allergen Reactions    Revlimid [lenalidomide] Swelling     Facial swelling  6/8/17 - in the process of desensitation   Last reviewed on  4/24/2022 6:29 PM by Wally Carrera    Drug Interactions    Drug interactions evaluated: yes  Clinically relevant drug interactions identified: no  Provided the patient with educational material regarding drug interactions: not applicable         Adverse Effects    *All other systems reviewed and are negative       Assessment Questions - Documented Responses    Flowsheet Row Most Recent Value   Assessment    Medication Reconciliation completed for patient Yes   During the past 4 weeks, has patient missed any activities due to condition or medication? No   During the past 4 weeks, did patient have any of the  following urgent care visits? None   Goals of Therapy Status Discussed (new start)   Status of the patients ability to self-administer: Is Able   All education points have been covered with patient? Yes, supplemental printed education provided   Welcome packet contents reviewed and discussed with patient? Yes   Assesment completed? Yes   Plan Therapy being initiated   Do you need to open a clinical intervention (i-vent)? No   Do you want to schedule first shipment? Yes   Medication #1 Assessment Info    Patient status New medication, Exisiting to [facility]   Is this medication appropriate for the patient? Yes   Is this medication effective? Not yet started        Refill Questions - Documented Responses    Flowsheet Row Most Recent Value   Patient Availability and HIPAA Verification    Does patient want to proceed with activity? Yes   HIPAA/medical authority confirmed? Yes   Relationship to patient of person spoken to? Self   Refill Screening Questions    When does the patient need to receive the medication? 05/02/22   Refill Delivery Questions    How will the patient receive the medication? Delivery Patricia   When does the patient need to receive the medication? 05/02/22   Shipping Address Home   Address in [facility] confirmed and updated if neccessary? Yes   Expected Copay ($) 5   Is the patient able to afford the medication copay? Yes   Payment Method CC on file   Days supply of Refill 30   Supplies needed? No supplies needed   Refill activity completed? Yes   Refill activity plan Refill scheduled   Shipment/Pickup Date: 05/02/22          Objective    She has a past medical history of Allergic rhinitis, Allergy, Anemia, Anxiety, CAD (coronary artery disease), Carotid stenosis, Colon polyps (2016), Controlled type 2 diabetes mellitus without complication, without long-term current use of insulin (10/19/2016), Depression, GERD (gastroesophageal reflux disease), Hearing loss in right ear, psychiatric care, MDS  "(myelodysplastic syndrome) with 5q deletion, Psychiatric problem, and Therapy.    Tried/failed medications: was on Revlimid. Currently on Vidaza     BP Readings from Last 4 Encounters:   04/27/22 (!) 115/51   04/22/22 (!) 104/51   04/19/22 (!) 120/56   04/08/22 (!) 145/65     Ht Readings from Last 4 Encounters:   04/24/22 5' 5.5" (1.664 m)   04/04/22 5' 5" (1.651 m)   03/07/22 5' 5" (1.651 m)   02/15/22 5' 5" (1.651 m)     Wt Readings from Last 4 Encounters:   04/25/22 69.5 kg (153 lb 3.5 oz)   04/04/22 71.2 kg (156 lb 15.5 oz)   03/07/22 71.2 kg (157 lb 1.2 oz)   02/15/22 70.5 kg (155 lb 8.6 oz)     Recent Labs   Lab Result Units 04/27/22  0419 04/26/22  0323 04/25/22  0847 04/24/22  0339   Creatinine mg/dL 0.7 0.7 0.7 0.7   ALT U/L 28 30 27 28   AST U/L 17 21 18 19   Hemoglobin g/dL 8.7 L 6.9 L 6.9 L 7.4 L     The goals of prescribed drug therapy management include:  · Supporting patient to meet the prescriber's medical treatment objectives  · Improving or maintaining quality of life  · Maintaining optimal therapy adherence  · Minimizing and managing side effects      Goals of Therapy Status: Discussed (new start)    Assessment/Plan  Patient plans to start therapy on 05/02/22      Indication, dosage, appropriateness, effectiveness, safety and convenience of her specialty medication(s) were reviewed today.     Patient Education   Patient received education on the following:    Expectations and possible outcomes of therapy   Proper use, timely administration, and missed dose management   Duration of therapy   Side effects, including prevention, minimization, and management   Contraindications and safety precautions   New or changed medications, including prescribe and over the counter medications and supplements   Reviews recommended vaccinations, as appropriate   Storage, safe handling, and disposal        Tasks added this encounter   5/25/2022 - Refill Call (Auto Added)  1/28/2023 - Clinical - Follow Up " Assesement (Annual)   Tasks due within next 3 months   No tasks due.     Shanthi Saravia, PharmD  [facility]  [address]  [facility] [address]  Phone: [phone]  Fax: [phone]

## 2022-04-28 NOTE — DISCHARGE SUMMARY
[facility] - On[facility] ([facility])  Hematology  Bone Marrow Transplant  Discharge Summary      Patient Name: Susan Puente  MRN: [id number]  Admission Date: 4/23/2022  Hospital Length of Stay: 4 days  Discharge Date and Time: 4/27/2022  7:52 PM  Attending Physician: No att. providers found   Discharging Provider: Altagracia Cornejo NP  Primary Care Provider: Claudia Rapp MD    HPI: Pt is a 71 year old woman with history of MDS curerntly on Vidaza and Decogen, CAD, T2DM presents to the hospital with 2 weeks of productive cough and R sided chest pain. Patient states that sxs initiated with nasal congestions which persisted and cont to worsen. A week ago she developed congestion like feeling in chest associated with cough productive of yellow sputum, n/v. Has vomited atleast once a day for the past week. Denies any associated sob/maxwell, palpitations, fever, chills.   Denies any recent sick contact.     Pt is a former tobacco smoker. Quit smoking 3 yrs ago. Denies alcohol use or illicit drug use. Lives by herself and her dogs and independently performs her ADLs and IADLs.     On arrival to the hospital she was afebrile, HR 92, /73, and saturating 98% on 3L NC. Labs with pancytopenia with wbc 2.94, hgb 7.4 and plt 83 (stable from prior). cmp unremarkable. Trop 0.014. EKG w/o new ST-T changes. CXR with mild pulm venous congestion in addition to likely infilrate in LL lobe.  Placed on rocephin and azithro at the ED and admitted to the BMT service for CAP.         Oncology History (per prior notes from Dr. Valdes)  71 year old female with hx of DM2, peripheral vascular disease, tobacco use, CAD, hyperlipidemia, hypertension who was hospitalized 11/10/16 for anemia. hgb 5.3  MCH 43.4 with normal WBC and platelets. Patient had normal iron stores. She was transfused 3 units of PRBCs and discharged home with hgb 8.7 on 11/11/16. She was referred for further evalutation of her anemia. On 12/16/16 patient had a normal  SPEP and immunofixation. Slightly elevated kappa light chains with normal ration. Elevated vitamin b12, normal folate, JAYDEN was positive with a low titer and negative profile. Patient had a bone marrow biopsy 1/5/16 which showed the core biopsy is normocellular for age (40%); however, megakaryocytes are increased and show frequent small, hypolobated forms. Additionally, a subset of the neutrophils are hypogranular. Blasts are not increased by either morphology (1.2%) or in the corresponding flow cytometric analysis. Fish detects a 5q deletion in 54.5% of nuclei. Cytogenetics reported 20 metaphases, 2 metaphases were normal and 18 metaphases had a 5q deletion. No additional cytogenetic abnormalities were detected. Findings consistent with 5q deletion syndrome.     Patient had a delay in obtaining Revlimid 10 mg daily but did start taking the medication 2/8/17. On 2/9/17 patient developed a diffuse maculopapular rash throughout scalp arms, legs and torso. She states she had no stridor or wheezing. She discontinued medication 2/15/17. Went to allergist who provided references on desensitization so that patient could resume Revlimid. Unfortunately developed pancytopenia and Revlimid stopped 6/16/17. She had a repeat BMBX  performed 6/8/17 and showed a hypercellular marrow (60%) continued atypia in the granulocytes and megakaryocytes were noted.  Additionally, there is erythroid atypia present. No increase in blasts. Cytogenetics are normal and MDS FISH is negative, failing to show 5 q minus. NGS should no significant molecular mutations.  Anemia work up revealed bienvenido negative hemolysis.     Revlimid has been stopped since June 2017 after she developed pancytopenia while on Revlimid (required desensitization). CBC was normal for almost 1 year and marrow was negative for del5q. Bone marrow biopsy repeat from 6/2018 showed relapsed 5q minus MDS without new or additional mutations. Revlimid restarted at 2.5 mg daily with  prednisone to prevent allergic reaction. Titrated to a goal of 10mg daily Revlimid. Hospital admission 3/12-3/17/19 for unresponsive event after blood transfusion, found to be profoundly pancytopenic at admission. Since hospital admission Revlimid has been stopped. Repeat marrow March 2019 shows persistent MDS with 5q minus.      Started cycle 1 Vidaza 5/6/19 subq for 5 days every 28 days. Restaging bone marrow biopsy from 10/24/19 shows persistent MDS, no excess blasts and 3 of 20 metaphases with 5q deletion.       * No surgery found *     Hospital Course: Admitted 4/23/22 with cough and R sided chest pain. D-dimer elevated. CTA with no PE but did reveal RUL and RLL PNA. T.max 100.2. Started on Rocephin and azithromycin. Completed 3 day course of azithromycin, and rocephin changed to PO LVQ at treatment dose. She will complete a total of 7 days of abx for CAP on 4/30/22.Requiring supplemental O2 for acute hypoxic resp failure during admission. Not O2-dependent at home. Discharged on supplemental O2. Can be stopped once hypoxia resolves. She will f/u with Dr. Valdes on 5/2/22.     CAP (community acquired pneumonia)  - Pt with 2 weeks of productive cough, nasal congestion  - Upon arrival to the hospial she was hypoxic requiring 3L NC, unable to wean O2 today  - CXR with likely develop consolidation  - CTA shows New patchy consolidative opacities in the in the right upper and posterior right lower lobes.  Findings suggestive infectious etiology such as pneumonia, multifocal pneumonia.  Also consider atypical given patient's reported immunocompromised status.  Aspiration could present similarly.  Consider continued follow-up after acute clinical illness has resolved to ensure resolution. New mediastinal and bilateral hilar lymphadenopathy, likely reactive.  - completed azithromycin.   - changed cefepime to oral LVQ on 4/26. Will complete a 7 day course of abx on 4/30.     Acute respiratory failure with hypoxia  Patient  with Hypoxic Respiratory failure which is Acute.  she is not on home oxygen. Supplemental oxygen was provided and noted-  .   Signs/symptoms of respiratory failure include- tachypnea and increased work of breathing. Contributing diagnoses includes - ARDS Labs and images were reviewed. Patient Has not had a recent ABG. Will treat underlying causes and adjust management of respiratory failure as follows- will perform six minute walk test to determine if patient qualifies for home O2.     MDS (myelodysplastic syndrome)  - Primary oncologist, Dr. 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 Developed allergy and was then desensitized. Soon after developed pancytopenia and Revlimid was stopped June 2017.    - BMBX on 6/8/17 was with normal cytogenetics. 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 Of 20 metaphases, 5 were normal and 15 had a 5q deletion, NGS positive for SF3B1 and TP53 (12%). 1.4% blasts  - Received vidaza for 22 cycles, received Decitabine for C23 due to national shortage of vidaza and then back to vidaza with C24.  - completed cycle 39 on 4/8/22     Pancytopenia due to antineoplastic chemotherapy  Transfuse for hgb < 7 or plts < 10K  Daily cbc while inpatient  Continue acyclovir ppx     Moderate malnutrition  Nutrition consulted. Most recent weight and BMI monitored-      Malnutrition (Moderate to Severe)  Weight Loss (Malnutrition): 5% in 1 month  Energy Intake (Malnutrition): less than 75% for  greater than 7 days     Measurements:      Wt Readings from Last 1 Encounters:   04/25/22 69.5 kg (153 lb 3.5 oz)   Body mass index is 25.11 kg/m².     Recommendations: Recommendation/Intervention: 1. Continue current Regular diet, add [employer] ONS to aid in caloric intake. 2. RD to monitor & follow-up.  Goals: Meet % EEN, EPN by RD f/u date     Patient has been screened and assessed by RD. RD will follow patient.        Controlled type 2 diabetes mellitus without complication, without long-term current use of insulin  Will hold home po diabetic medications  SSI  Goal bg 140-180     Adjustment disorder with mixed anxiety and depressed mood  - Cont celexa     Essential hypertension  Home medications include lisinopril/hctz and coreg  Coreg initiated  BP elevated and home lisinopril/hctz retstarted     Anemia requiring transfusions  - 2/2 MDS  - hgb up to 8.7 today  - Will transfuse for hgb <7     CAD (coronary artery disease)  - S/P left carotid endarterectomy prior to cancer dx, s/p PCI (3 stents) >20 years ago   - Continue on ASA 81mg daily, platelets are wnl  - Initiation of statin?    Goals of Care Treatment Preferences:  Code Status: DNR    Living Will: Yes              Consults (From admission, onward)        Status Ordering Provider     Inpatient consult to Registered Dietitian/Nutritionist  Once        Provider:  (Not yet assigned)    Completed TANNER GRANADOS          Significant Diagnostic Studies: Labs:   CMP   Recent Labs   Lab 04/27/22  0419   *   K 3.5   CL 93*   CO2 32*   GLU 98   BUN 14   CREATININE 0.7   CALCIUM 8.7   PROT 5.9*   ALBUMIN 2.5*   BILITOT 0.6   ALKPHOS 62   AST 17   ALT 28   ANIONGAP 10   ESTGFRAFRICA >60.0   EGFRNONAA >60.0    and CBC   Recent Labs   Lab 04/27/22  0419   WBC 2.26*   HGB 8.7*   HCT 26.6*   *       Pending Diagnostic Studies:     None        Final Active Diagnoses:    Diagnosis Date Noted POA    PRINCIPAL PROBLEM:  CAP (community acquired pneumonia)  "[J18.9] 04/24/2022 Yes    Acute respiratory failure with hypoxia [J96.01] 04/26/2022 Yes    MDS (myelodysplastic syndrome) [D46.9] 06/07/2018 Yes    Pancytopenia due to antineoplastic chemotherapy [D61.810, T45.1X5A] 04/26/2022 Yes    Moderate malnutrition [E44.0] 04/24/2022 Yes    Controlled type 2 diabetes mellitus without complication, without long-term current use of insulin [E11.9] 07/17/2021 Yes    Adjustment disorder with mixed anxiety and depressed mood [F43.23] 03/05/2018 Yes    Essential hypertension [I10] 12/01/2016 Yes    Anemia requiring transfusions [D64.9] 11/10/2016 Yes    CAD (coronary artery disease) [I25.10]  Yes      Problems Resolved During this Admission:      Discharged Condition: stable    Disposition: Home or Self Care    Follow Up:    Patient Instructions:      OXYGEN FOR HOME USE     Order Specific Question Answer Comments   Liter Flow 2    Duration Continuous    Qualifying Test Performed at: Activity    Oxygen saturation at rest 90    Oxygen saturation with activity 86    Oxygen saturation with activity on oxygen 93    Portable mode: pulse dose acceptable    Mode: Portable concentrator    Route nasal cannula    Device: home concentrator with portable concentrator    Length of need (in months): 99 mos    Patient condition with qualifying saturation Other - List qualifying diagnosis and code acute hypoxic respiratory failure   Select a diagnosis & list the code in the comments Acute hypoxemic respiratory failure [0260357]    Height: 5' 5.5" (1.664 m)    Weight: 69.5 kg (153 lb 3.5 oz)    Alternative treatment measures have been tried or considered and deemed clinically ineffective. Yes      Diet Cardiac     Notify your health care provider if you experience any of the following:  temperature >100.4     Notify your health care provider if you experience any of the following:  persistent nausea and vomiting or diarrhea     Notify your health care provider if you experience any of the " following:  severe uncontrolled pain     Notify your health care provider if you experience any of the following:  redness, tenderness, or signs of infection (pain, swelling, redness, odor or green/yellow discharge around incision site)     Notify your health care provider if you experience any of the following:  difficulty breathing or increased cough     Notify your health care provider if you experience any of the following:  severe persistent headache     Notify your health care provider if you experience any of the following:  persistent dizziness, light-headedness, or visual disturbances     Notify your health care provider if you experience any of the following:  increased confusion or weakness     Activity as tolerated     Medications:  Reconciled Home Medications:      Medication List      START taking these medications    deferasirox 500 MG disintegrating tablet  Commonly known as: EXJADE  Take 3 tablets (1,500 mg total) by mouth before breakfast.     levoFLOXacin 750 MG tablet  Commonly known as: LEVAQUIN  Take 1 tablet (750 mg total) by mouth once daily. for 3 days        CHANGE how you take these medications    ciprofloxacin HCl 500 MG tablet  Commonly known as: CIPRO  Take 1 tablet (500 mg total) by mouth 2 (two) times daily.  Start taking on: May 1, 2022  What changed: These instructions start on May 1, 2022. If you are unsure what to do until then, ask your doctor or other care provider.     loratadine 10 mg tablet  Commonly known as: CLARITIN  Take 10 mg by mouth daily as needed.  What changed: Another medication with the same name was removed. Continue taking this medication, and follow the directions you see here.        CONTINUE taking these medications    acyclovir 400 MG tablet  Commonly known as: ZOVIRAX  Take 1 tablet (400 mg total) by mouth 2 (two) times daily.     aspirin 81 MG EC tablet  Commonly known as: ECOTRIN  Take 1 tablet (81 mg total) by mouth once daily.     carvediloL 12.5 MG  tablet  Commonly known as: COREG  Take 1 tablet (12.5 mg total) by mouth 2 (two) times daily with meals.     citalopram 20 MG tablet  Commonly known as: CeleXA  Take 1 tablet (20 mg total) by mouth once daily.     fluconazole 200 MG Tab  Commonly known as: DIFLUCAN  Take 2 tablets (400 mg total) by mouth once daily.     lisinopriL-hydrochlorothiazide 20-12.5 mg per tablet  Commonly known as: PRINZIDE,ZESTORETIC  Take 2 tablets by mouth once daily.     magnesium oxide 400 mg (241.3 mg magnesium) tablet  Commonly known as: MAGOX  Take 1 tablet (400 mg total) by mouth once daily.     melatonin 10 mg Tab  Take 1 tablet (10 mg total) by mouth once daily.     metFORMIN 500 MG ER 24hr tablet  Commonly known as: GLUCOPHAGE-XR  Take 1 tablet (500 mg total) by mouth daily with breakfast.     MULTIVITAMIN ORAL  Take by mouth.     pantoprazole 40 MG tablet  Commonly known as: PROTONIX  Take 1 tablet (40 mg total) by mouth once daily.     potassium chloride SA 20 MEQ tablet  Commonly known as: K-DUR,KLOR-CON  Take 1 tablet (20 mEq total) by mouth once daily.     REPATHA SURECLICK 140 mg/mL Pnij  Generic drug: evolocumab  Inject 140mg (1 pen) into the skin every 2 weeks.     TRUEPLUS LANCETS 30 gauge Misc  Generic drug: lancets  Use to test blood sugar daily     * VIDAZA INJ  Inject as directed every 28 days.     * azaCITIDine 100 mg Solr chemo injection  Commonly known as: VIDAZA         * This list has 2 medication(s) that are the same as other medications prescribed for you. Read the directions carefully, and ask your doctor or other care provider to review them with you.            STOP taking these medications    acetaminophen 650 MG Tbsr  Commonly known as: TYLENOL     magnesium 30 mg Tab     UNABLE TO FIND            Altagracia Cornejo, NP  Bone Marrow Transplant  [facility] - Oncology ([facility])

## 2022-04-28 NOTE — TELEPHONE ENCOUNTER
Outgoing call to patient regarding Exjade prescription we received. Informed patient Exjade has been approved through insurance with a high copay. Stu funding available currently. Patient provided consent, HH size, and annual income and gave permission for [facility] to apply for stu on her behalf. [facility] will be back in touch once FA is complete.

## 2022-04-28 NOTE — TELEPHONE ENCOUNTER
Secured Good Days stu for Exjade and patient will pay $5 with stu on file. Forwarding to assigned [facility] for initial consult.     FOR DOCUMENTATION ONLY:  Financial Assistance for Exjade is approved from 4/28/22 to 12/31/22  Source: [employer]  BIN: [id number]  PCN: [id number]  Id: [id number]  GRP: [id number]  Stu Amount: $5000  Patient Responsibility $5.00

## 2022-04-28 NOTE — TELEPHONE ENCOUNTER
Returned call to patient , reviewed upcoming appointments with patient, home health at the house currently admitting patient to home health. Patient is  inquiring about rehab as she lives alone.  Informed her I would reach out to our  to call her and assess her needs, rehab vs assisted living and or nursing home.   We discussed keeping upcoming appointments as is and that we could assess needs and options at that time if we were not able to arrange anything prior, patient agreeable. Will also ask  to identify help with transportation so that she may be bale to keep her appointments.     message routed to Lora Last

## 2022-04-28 NOTE — TELEPHONE ENCOUNTER
RX received for Exjade. PA approved. PA#[id number]. Approved from 1/1/22-12/31/22. copay is $197.80. forwarding to BI/FA for further assistance.

## 2022-04-29 PROBLEM — R53.81 PHYSICAL DEBILITY: Status: ACTIVE | Noted: 2022-01-01

## 2022-04-29 PROBLEM — M10.9 ACUTE GOUT OF FOOT: Status: ACTIVE | Noted: 2022-01-01

## 2022-04-29 NOTE — PHARMACY MED REC
"Admission Medication History     The home medication history was taken by Linda Galan.    You may go to "Admission" then "Reconcile Home Medications" tabs to review and/or act upon these items.      The home medication list has been updated by the Pharmacy department.    Please read ALL comments highlighted in yellow.    Please address this information as you see fit.     Feel free to contact us if you have any questions or require assistance.      The medications listed below were removed from the home medication list. Please reorder if appropriate:  Patient reports no longer taking the following medication(s):   METFORMIN 500 MG ER TAB    Medications listed below were obtained from: Patient     PTA Medications   Medication Sig    acetaminophen (TYLENOL) 650 MG TbSR   Take 1,300 mg by mouth daily as needed (Headache).    acyclovir (ZOVIRAX) 400 MG tablet   Take 1 tablet (400 mg total) by mouth 2 (two) times daily.    aspirin (ECOTRIN) 81 MG EC tablet   Take 1 tablet (81 mg total) by mouth once daily.    azacitidine (VIDAZA INJ)   Inject as directed every 28 days.    carvediloL (COREG) 12.5 MG tablet   Take 1 tablet (12.5 mg total) by mouth 2 (two) times daily with meals.    ciprofloxacin HCl (CIPRO) 500 MG tablet   Take 1 tablet (500 mg total) by mouth 2 (two) times daily.    citalopram (CELEXA) 20 MG tablet   Take 1 tablet (20 mg total) by mouth once daily.    evolocumab (REPATHA SURECLICK) 140 mg/mL PnIj    Inject 140mg (1 pen) into the skin every 2 weeks.    fluconazole (DIFLUCAN) 200 MG Tab   Take 2 tablets (400 mg total) by mouth once daily.    ketotifen (ZADITOR) 0.025 % (0.035 %) ophthalmic solution   Place 2-3 drops into both eyes daily as needed (Allergies).    lactose-reduced food (ENSURE ORAL)   Take by mouth daily as needed (supplement).    levoFLOXacin (LEVAQUIN) 750 MG tablet   Take 1 tablet (750 mg total) by mouth once daily. for 3 days    lisinopriL-hydrochlorothiazide " (PRINZIDE,ZESTORETIC) 20-12.5 mg per tablet   Take 2 tablets by mouth once daily.    loperamide (IMODIUM) 2 mg capsule   Take 4 mg by mouth daily as needed for Diarrhea.    loratadine (CLARITIN) 10 mg tablet   Take 10 mg by mouth daily as needed.     magnesium oxide (MAGOX) 400 mg (241.3 mg magnesium) tablet   Take 1 tablet (400 mg total) by mouth once daily.    melatonin (MELATIN) 5 mg Take 10 mg by mouth every evening.    MULTIVITAMIN ORAL   Take 1 tablet by mouth once daily.    pantoprazole (PROTONIX) 40 MG tablet   Take 1 tablet (40 mg total) by mouth once daily.    polyethylene glycol (MIRALAX) 17 gram PwPk   Take 17 g by mouth daily as needed (Constipation).    potassium chloride SA (K-DUR,KLOR-CON) 20 MEQ tablet   Take 1 tablet (20 mEq total) by mouth once daily.    deferasirox (EXJADE) 500 MG disintegrating tablet   Take 3 tablets (1,500 mg total) by mouth before breakfast.    lancets 30 gauge Misc Use to test blood sugar daily (Patient taking differently: Use to test blood sugar daily)       Potential issues to be addressed PRIOR TO DISCHARGE  Patient requested refills for the following medications: (LISINOPRIL-HCTZ 20-12.5)    Linda Galan CPhT  [id number] [phone]                .

## 2022-04-29 NOTE — ASSESSMENT & PLAN NOTE
Transfuse for hgb < 7 or plts < 10K  Daily cbc while inpatient  Continue acyclovir and fluconazole ppx  Will resume cipro ppx upon completion of LVQ for CAP

## 2022-04-29 NOTE — ASSESSMENT & PLAN NOTE
- patient reports difficulty bearing weight on bilat feet due to pain  - x-ray showing soft tissue edema suggestive of gout flare  - uric acid wnl  - started colchicine

## 2022-04-29 NOTE — SUBJECTIVE & OBJECTIVE
Patient information was obtained from patient, past medical records, and ER records.     Oncology History (from Dr. Valdes's clinic note):   71 year old female with hx of DM2, peripheral vascular disease, tobacco use, CAD, hyperlipidemia, hypertension who was hospitalized 11/10/16 for anemia. hgb 5.3  MCH 43.4 with normal WBC and platelets. Patient had normal iron stores. She was transfused 3 units of PRBCs and discharged home with hgb 8.7 on 11/11/16. She was referred for further evalutation of her anemia. On 12/16/16 patient had a normal SPEP and immunofixation. Slightly elevated kappa light chains with normal ration. Elevated vitamin b12, normal folate, JAYDEN was positive with a low titer and negative profile. Patient had a bone marrow biopsy 1/5/16 which showed the core biopsy is normocellular for age (40%); however, megakaryocytes are increased and show frequent small, hypolobated forms. Additionally, a subset of the neutrophils are hypogranular. Blasts are not increased by either morphology (1.2%) or in the corresponding flow cytometric analysis. Fish detects a 5q deletion in 54.5% of nuclei. Cytogenetics reported 20 metaphases, 2 metaphases were normal and 18 metaphases had a 5q deletion. No additional cytogenetic abnormalities were detected. Findings consistent with 5q deletion syndrome.     Patient had a delay in obtaining Revlimid 10 mg daily but did start taking the medication 2/8/17. On 2/9/17 patient developed a diffuse maculopapular rash throughout scalp arms, legs and torso. She states she had no stridor or wheezing. She discontinued medication 2/15/17. Went to allergist who provided references on desensitization so that patient could resume Revlimid. Unfortunately developed pancytopenia and Revlimid stopped 6/16/17. She had a repeat BMBX  performed 6/8/17 and showed a hypercellular marrow (60%) continued atypia in the granulocytes and megakaryocytes were noted.  Additionally, there is erythroid  atypia present. No increase in blasts. Cytogenetics are normal and MDS FISH is negative, failing to show 5 q minus. NGS should no significant molecular mutations.  Anemia work up revealed bienvenido negative hemolysis.     Revlimid has been stopped since June 2017 after she developed pancytopenia while on Revlimid (required desensitization). CBC was normal for almost 1 year and marrow was negative for del5q. Bone marrow biopsy repeat from 6/2018 showed relapsed 5q minus MDS without new or additional mutations. Revlimid restarted at 2.5 mg daily with prednisone to prevent allergic reaction. Titrated to a goal of 10mg daily Revlimid. Hospital admission 3/12-3/17/19 for unresponsive event after blood transfusion, found to be profoundly pancytopenic at admission. Since hospital admission Revlimid has been stopped. Repeat marrow March 2019 shows persistent MDS with 5q minus.      Started cycle 1 Vidaza 5/6/19 subq for 5 days every 28 days. Restaging bone marrow biopsy from 10/24/19 shows persistent MDS, no excess blasts and 3 of 20 metaphases with 5q deletion.     (Not in a hospital admission)      Revlimid [lenalidomide]     Past Medical History:   Diagnosis Date    Allergic rhinitis     Allergy     Anemia     Anxiety     CAD (coronary artery disease)     Carotid stenosis     Colon polyps 2016    Controlled type 2 diabetes mellitus without complication, without long-term current use of insulin 10/19/2016    Depression     GERD (gastroesophageal reflux disease)     Hearing loss in right ear     Hx of psychiatric care     Jose Schaffer    MDS (myelodysplastic syndrome) with 5q deletion     Psychiatric problem     Therapy      Past Surgical History:   Procedure Laterality Date    BONE MARROW BIOPSY Left 6/7/2018    Procedure: Biopsy-bone marrow;  Surgeon: Gita Valdes MD;  Location: [facility] OR [address] [place] [address];  Service: Oncology;  Laterality: Left;    BONE MARROW BIOPSY Left 3/21/2019    Procedure: Biopsy-bone marrow;  Surgeon: Gita  MD Lucio;  Location: [facility] OR [facility] FLR;  Service: Oncology;  Laterality: Left;    BONE MARROW BIOPSY Left 10/24/2019    Procedure: Biopsy-bone marrow;  Surgeon: Gita Valdes MD;  Location: [facility] OR 2ND FLR;  Service: Oncology;  Laterality: Left;  left iliac crest    BONE MARROW BIOPSY Left 2021    Procedure: Biopsy-bone marrow;  Surgeon: Gita Valdes MD;  Location: [facility] ENDO (4TH FLR);  Service: Oncology;  Laterality: Left;    BUNIONECTOMY      CAROTID ENDARTERECTOMY Left     CAROTID STENT      COLONOSCOPY N/A 2016    Procedure: COLONOSCOPY;  Surgeon: PATRICIA Simpson MD;  Location: [facility] ENDO (4TH FLR);  Service: Endoscopy;  Laterality: N/A;  pt has vomiting with anesthesia in past    ENDOMETRIAL ABLATION      for endometriosis    INSERTION OF TUNNELED CENTRAL VENOUS CATHETER (CVC) WITH SUBCUTANEOUS PORT N/A 2020    Procedure: [id number];  Surgeon: Dosc Diagnostic Provider;  [facility];  Service: Radiology;  Laterality: N/A;  189    TONSILLECTOMY      TYMPANOSTOMY TUBE PLACEMENT       Family History       Problem Relation (Age of Onset)    Alcohol abuse Father, Brother    Cancer Paternal Grandmother    Heart disease Mother, Father    Hyperlipidemia Mother          Tobacco Use    Smoking status: Former Smoker     Packs/day: 0.50     Years: 36.00     Pack years: 18.00     Types: Cigarettes, Vaping with nicotine     Quit date: 3/3/2017     Years since quittin.1    Smokeless tobacco: Never Used   Substance and Sexual Activity    Alcohol use: No    Drug use: No    Sexual activity: Not on file       Review of Systems  Objective:     Vital Signs (Most Recent):  Temp: 98.1 °F (36.7 °C) (22 1134)  Pulse: 82 (22 1430)  Resp: (!) 24 (22 1430)  BP: (!) 162/69 (22 1626)  SpO2: 99 % (22 1430)   Vital Signs (24h Range):  Temp:  [98.1 °F (36.7 °C)] 98.1 °F (36.7 °C)  Pulse:  [80-84] 82  Resp:  [18-24] 24  SpO2:  [96 %-99 %] 99 %  BP: (162-189)/(66-81) 162/69      Weight: 68 kg (150 lb)  Body mass index is 24.58 kg/m².  Body surface area is 1.77 meters squared.    ECOG SCORE           [unfilled]    Lines/Drains/Airways       Central Venous Catheter Line  Duration             Tunneled Central Line Insertion/Assessment - Double Lumen  02/19/20 1319 right internal jugular 800 days    Port A Cath Single Lumen 02/14/22 1615 73 days                    Physical Exam    Significant Labs:   CBC:   Recent Labs   Lab 04/29/22  1248   WBC 2.19*   HGB 8.1*   HCT 24.8*       and CMP:   Recent Labs   Lab 04/29/22  1248   *   K 3.4*   CL 91*   CO2 32*      BUN 13   CREATININE 0.7   CALCIUM 9.6   PROT 6.5   ALBUMIN 2.3*   BILITOT 0.5   ALKPHOS 67   AST 26   ALT 28   ANIONGAP 10   EGFRNONAA >60.0       Diagnostic Results:  I have reviewed all pertinent imaging results/findings within the past 24 hours.

## 2022-04-29 NOTE — ED TRIAGE NOTES
"Pt arrived to the ED by EMS with the primary complaint of bilateral foot pain and swelling and she can't get on the "Port -a-potty" on her own. She was previously her for pneumonia 3 days ago.   "

## 2022-04-29 NOTE — H&P
[facility] - Emergency Dept  Hematology  Bone Marrow Transplant  H&P    Subjective:     Principal Problem: Acute gout of foot    HPI: Ms. Puente is a 71-y-o patient of Dr. Valdes with MDS on treatment with Vidaza (currently receiving decitabine due to national Vidaza shortage). Other medical history includes DM2, HTN, CAD, anxiety, and depression. She was admitted 4/23/22 to 4/27/22 with CAP. She was discharged on LVQ, which she will complete on 4/30. She was discharged 4/27/22 with home health and home oxygen. She presented to the ED today with complaint of bilat foot pain making it difficult to ambulate at home. Redness noted to bilat great toes. X-ray showing soft tissue edema suggestive of gout to right foot and bunion to left foot. She has a history of gout per patient. She will be admitted to BMT service under observation status. PT/OT will be consulted for eval of SNF appropriateness. She reports that her breathing is significantly improved.      Patient information was obtained from patient, past medical records, and ER records.     Oncology History (from Dr. Valdes's clinic note):   71 year old female with hx of DM2, peripheral vascular disease, tobacco use, CAD, hyperlipidemia, hypertension who was hospitalized 11/10/16 for anemia. hgb 5.3  MCH 43.4 with normal WBC and platelets. Patient had normal iron stores. She was transfused 3 units of PRBCs and discharged home with hgb 8.7 on 11/11/16. She was referred for further evalutation of her anemia. On 12/16/16 patient had a normal SPEP and immunofixation. Slightly elevated kappa light chains with normal ration. Elevated vitamin b12, normal folate, JAYDEN was positive with a low titer and negative profile. Patient had a bone marrow biopsy 1/5/16 which showed the core biopsy is normocellular for age (40%); however, megakaryocytes are increased and show frequent small, hypolobated forms. Additionally, a subset of the neutrophils are hypogranular. Blasts are not  increased by either morphology (1.2%) or in the corresponding flow cytometric analysis. Fish detects a 5q deletion in 54.5% of nuclei. Cytogenetics reported 20 metaphases, 2 metaphases were normal and 18 metaphases had a 5q deletion. No additional cytogenetic abnormalities were detected. Findings consistent with 5q deletion syndrome.     Patient had a delay in obtaining Revlimid 10 mg daily but did start taking the medication 2/8/17. On 2/9/17 patient developed a diffuse maculopapular rash throughout scalp arms, legs and torso. She states she had no stridor or wheezing. She discontinued medication 2/15/17. Went to allergist who provided references on desensitization so that patient could resume Revlimid. Unfortunately developed pancytopenia and Revlimid stopped 6/16/17. She had a repeat BMBX  performed 6/8/17 and showed a hypercellular marrow (60%) continued atypia in the granulocytes and megakaryocytes were noted.  Additionally, there is erythroid atypia present. No increase in blasts. Cytogenetics are normal and MDS FISH is negative, failing to show 5 q minus. NGS should no significant molecular mutations.  Anemia work up revealed bienvenido negative hemolysis.     Revlimid has been stopped since June 2017 after she developed pancytopenia while on Revlimid (required desensitization). CBC was normal for almost 1 year and marrow was negative for del5q. Bone marrow biopsy repeat from 6/2018 showed relapsed 5q minus MDS without new or additional mutations. Revlimid restarted at 2.5 mg daily with prednisone to prevent allergic reaction. Titrated to a goal of 10mg daily Revlimid. Hospital admission 3/12-3/17/19 for unresponsive event after blood transfusion, found to be profoundly pancytopenic at admission. Since hospital admission Revlimid has been stopped. Repeat marrow March 2019 shows persistent MDS with 5q minus.      Started cycle 1 Vidaza 5/6/19 subq for 5 days every 28 days. Restaging bone marrow biopsy from  10/24/19 shows persistent MDS, no excess blasts and 3 of 20 metaphases with 5q deletion.     (Not in a hospital admission)      Revlimid [lenalidomide]     Past Medical History:   Diagnosis Date    Allergic rhinitis     Allergy     Anemia     Anxiety     CAD (coronary artery disease)     Carotid stenosis     Colon polyps 2016    Controlled type 2 diabetes mellitus without complication, without long-term current use of insulin 10/19/2016    Depression     GERD (gastroesophageal reflux disease)     Hearing loss in right ear     Hx of psychiatric care     Celexa, Prozac    MDS (myelodysplastic syndrome) with 5q deletion     Psychiatric problem     Therapy      Past Surgical History:   Procedure Laterality Date    BONE MARROW BIOPSY Left 6/7/2018    Procedure: Biopsy-bone marrow;  Surgeon: Gita Valdes MD;  Location: [facility] OR [facility] FLR;  Service: Oncology;  Laterality: Left;    BONE MARROW BIOPSY Left 3/21/2019    Procedure: Biopsy-bone marrow;  Surgeon: Gita Valdes MD;  [facility];  Service: Oncology;  Laterality: Left;    BONE MARROW BIOPSY Left 10/24/2019    Procedure: Biopsy-bone marrow;  Surgeon: Gita Valdes MD;  [facility];  Service: Oncology;  Laterality: Left;  left iliac crest    BONE MARROW BIOPSY Left 4/21/2021    Procedure: Biopsy-bone marrow;  Surgeon: Gita Valdes MD;  Location: [facility] ENDO (4TH FLR);  Service: Oncology;  Laterality: Left;    BUNIONECTOMY      CAROTID ENDARTERECTOMY Left 2011    CAROTID STENT      COLONOSCOPY N/A 12/20/2016    Procedure: COLONOSCOPY;  Surgeon: PATRICIA Simpson MD;  Location: [facility] ENDO (4TH FLR);  Service: Endoscopy;  Laterality: N/A;  pt has vomiting with anesthesia in past    ENDOMETRIAL ABLATION      for endometriosis    INSERTION OF TUNNELED CENTRAL VENOUS CATHETER (CVC) WITH SUBCUTANEOUS PORT N/A 2/19/2020    Procedure: [id number];  Surgeon: Dosc Diagnostic Provider;  [facility];  Service:  Radiology;  Laterality: N/A;  189    TONSILLECTOMY      TYMPANOSTOMY TUBE PLACEMENT       Family History       Problem Relation (Age of Onset)    Alcohol abuse Father, Brother    Cancer Paternal Grandmother    Heart disease Mother, Father    Hyperlipidemia Mother          Tobacco Use    Smoking status: Former Smoker     Packs/day: 0.50     Years: 36.00     Pack years: 18.00     Types: Cigarettes, Vaping with nicotine     Quit date: 3/3/2017     Years since quittin.1    Smokeless tobacco: Never Used   Substance and Sexual Activity    Alcohol use: No    Drug use: No    Sexual activity: Not on file       Review of Systems  Objective:     Vital Signs (Most Recent):  Temp: 98.1 °F (36.7 °C) (22 1134)  Pulse: 82 (22 1430)  Resp: (!) 24 (22 1430)  BP: (!) 162/69 (22 1626)  SpO2: 99 % (22 1430)   Vital Signs (24h Range):  Temp:  [98.1 °F (36.7 °C)] 98.1 °F (36.7 °C)  Pulse:  [80-84] 82  Resp:  [18-24] 24  SpO2:  [96 %-99 %] 99 %  BP: (162-189)/(66-81) 162/69     Weight: 68 kg (150 lb)  Body mass index is 24.58 kg/m².  Body surface area is 1.77 meters squared.    ECOG SCORE           [unfilled]    Lines/Drains/Airways       Central Venous Catheter Line  Duration             Tunneled Central Line Insertion/Assessment - Double Lumen  20 1319 right internal jugular 800 days    Port A Cath Single Lumen 22 1615 73 days                    Physical Exam    Significant Labs:   CBC:   Recent Labs   Lab 22  1248   WBC 2.19*   HGB 8.1*   HCT 24.8*       and CMP:   Recent Labs   Lab 22  1248   *   K 3.4*   CL 91*   CO2 32*      BUN 13   CREATININE 0.7   CALCIUM 9.6   PROT 6.5   ALBUMIN 2.3*   BILITOT 0.5   ALKPHOS 67   AST 26   ALT 28   ANIONGAP 10   EGFRNONAA >60.0       Diagnostic Results:  I have reviewed all pertinent imaging results/findings within the past 24 hours.    Assessment/Plan:     * Acute gout of foot  - patient reports difficulty bearing  weight on bilat feet due to pain  - x-ray showing soft tissue edema suggestive of gout flare  - uric acid wnl  - started colchicine    Physical debility  - reports difficulty with ambulation due to pain and weakness  - PT/OT consulted for SNF consideration    Acute respiratory failure with hypoxia  Patient with Hypoxic Respiratory failure which is Acute.  she is not on home oxygen. Supplemental oxygen was provided and noted-  .   Signs/symptoms of respiratory failure include- tachypnea and increased work of breathing. Contributing diagnoses includes - ARDS Labs and images were reviewed. Patient Has not had a recent ABG. Will treat underlying causes and adjust management of respiratory failure as follows- performed 6 minute walk test at previous admission and patient qualified for home O2.    CAP (community acquired pneumonia)  - Pt with 2 weeks of productive cough, nasal congestion  - Upon arrival to the hospial she was hypoxic requiring 3L NC, unable to wean O2 today  - CXR with likely develop consolidation  - CTA shows New patchy consolidative opacities in the in the right upper and posterior right lower lobes.  Findings suggestive infectious etiology such as pneumonia, multifocal pneumonia.  Also consider atypical given patient's reported immunocompromised status.  Aspiration could present similarly.  Consider continued follow-up after acute clinical illness has resolved to ensure resolution. New mediastinal and bilateral hilar lymphadenopathy, likely reactive.  - completed azithromycin.   - changed cefepime to oral LVQ on 4/26. Will complete a 7 day course of abx on 4/30.    MDS (myelodysplastic syndrome)  - Primary oncologist, Dr. 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 Developed allergy and was then desensitized. Soon after developed pancytopenia and Revlimid was stopped June 2017.    - BMBX on 6/8/17 was with normal cytogenetics. Repeat marrow from 6/7/18 showed relapsed 5q  minus. 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 Of 20 metaphases, 5 were normal and 15 had a 5q deletion, NGS positive for SF3B1 and TP53 (12%). 1.4% blasts  - Received vidaza for 22 cycles, received Decitabine for C23 due to national shortage of vidaza and then back to vidaza with C24.  - completed cycle 39 on 4/8/22    Pancytopenia due to antineoplastic chemotherapy  Transfuse for hgb < 7 or plts < 10K  Daily cbc while inpatient  Continue acyclovir and fluconazole ppx  Will resume cipro ppx upon completion of LVQ for CAP    Controlled type 2 diabetes mellitus without complication, without long-term current use of insulin  Will hold home po diabetic medications  SSI, ACHS blood glucose monitoring, and diabetic diet  Goal bg 140-180    Adjustment disorder with mixed anxiety and depressed mood  - continue home celexa    Essential hypertension  Home medications include lisinopril/hctz and coreg  Coreg initiated  BP elevated and home lisinopril/hctz retstarted    CAD (coronary artery disease)  - S/P left carotid endarterectomy prior to cancer dx, s/p PCI (3 stents) >20 years ago   - Continue on ASA 81mg daily, platelets are wnl  - Initiation of statin?        VTE Risk Mitigation (From admission, onward)         Ordered     enoxaparin injection 40 mg  Daily         04/29/22 1651     IP VTE HIGH RISK PATIENT  Once         04/29/22 1651     Place sequential compression device  Until discontinued         04/29/22 1651                Disposition: Obs patient.    Altagracia Cornejo, NP  Bone Marrow  Transplant  Hematology  [facility] - Emergency Dept

## 2022-04-29 NOTE — ASSESSMENT & PLAN NOTE
Patient with Hypoxic Respiratory failure which is Acute.  she is not on home oxygen. Supplemental oxygen was provided and noted-  .   Signs/symptoms of respiratory failure include- tachypnea and increased work of breathing. Contributing diagnoses includes - ARDS Labs and images were reviewed. Patient Has not had a recent ABG. Will treat underlying causes and adjust management of respiratory failure as follows- performed 6 minute walk test at previous admission and patient qualified for home O2.

## 2022-04-29 NOTE — HPI
Ms. Puente is a 71-y-o patient of Dr. Valdes with MDS on treatment with Vidaza (currently receiving decitabine due to national Vidaza shortage). Other medical history includes DM2, HTN, CAD, anxiety, and depression. She was admitted 4/23/22 to 4/27/22 with CAP. She was discharged on LVQ, which she will complete on 4/30. She was discharged 4/27/22 with home health and home oxygen. She presented to the ED today with complaint of bilat foot pain making it difficult to ambulate at home. Redness noted to bilat great toes. X-ray showing soft tissue edema suggestive of gout to right foot and bunion to left foot. She has a history of gout per patient. She will be admitted to BMT service under observation status. PT/OT will be consulted for eval of SNF appropriateness. She reports that her breathing is significantly improved.

## 2022-04-29 NOTE — PROGRESS NOTES
T2 Resistance Panel 510 (k) Study  IRB: 2021.289  PI: Dr. Uriel Nick    Date: 04/29/2022  Subject Number:     After the informed consent process was completed, paired (in-house) blood cultures were drawn by the RN. The time of the blood draw was 12:50. The site of the blood draw was the port. The T2 research samples were drawn immediately following the paired blood cultures using the same site/needlestick.     Tube A was drawn at 12:56.   Tube B was drawn at 12:56.   Tube C was drawn at 12:56.    Accession number of paired blood culture is [id number].    The subject did not have any Adverse Events.        The subject's research blood sample was brought to the research lab for testing. Results will not be shared with the subject.      Laura Hernandez MD  [facility] Infectious Diseases  T2 Resistance Panel Clinical Trial

## 2022-04-29 NOTE — ASSESSMENT & PLAN NOTE
Will hold home po diabetic medications  SSI, ACHS blood glucose monitoring, and diabetic diet  Goal bg 140-180

## 2022-04-29 NOTE — ASSESSMENT & PLAN NOTE
- reports difficulty with ambulation due to pain and weakness  - PT/OT consulted for SNF consideration

## 2022-04-29 NOTE — ED PROVIDER NOTES
"Encounter Date: 4/29/2022       History     Chief Complaint   Patient presents with    Bilateral foot pain      Was d/c'ed 3 days ago for pneumonia; pt's family called EMS bc "they can't take care of her bc they are leaving for vacation". Pt usually uses walker to ambulate, states "I can't walk bc my feet hurt".      Patient is a 71-year-old female who presents for bilateral foot pain; pertinent PMH MDS, CAD, GERD, depression, DM 2, h/o CEA, recent hospitalization for multifocal PNA d/c on home supplemental O2 currently on Levaquin. Patient reports onset mild bilateral foot pain while hospitalized, worsening since returning home.  Pain is mostly located to dorsum of bilateral feet near bilateral big toes.  Remote history of gout to similar location in left foot.  Denies any trauma.  Bilateral foot pain makes it nearly impossible walk.  She lives at home alone, and this is nervous about getting around and using the bathroom.  Denies any recent fall.  Reports her breathing is improving "every day" at home.  Continued home oxygen use.  She is interested in pursuing rehab given these new symptoms.  The patients available PMH, PSH, Social History, medications, allergies, and triage vital signs were reviewed just prior to their medical evaluation.  A ten point review of systems was completed and is negative except as documented above.  Patient denies any other acute medical complaint.    Please be advised this text was dictated with [email or web address] software and may contain errors due to translation.           Review of patient's allergies indicates:   Allergen Reactions    Revlimid [lenalidomide] Swelling     Facial swelling  6/8/17 - in the process of desensitation     Past Medical History:   Diagnosis Date    Allergic rhinitis     Allergy     Anemia     Anxiety     CAD (coronary artery disease)     Carotid stenosis     Colon polyps 2016    Controlled type 2 diabetes mellitus without complication, without long-term " current use of insulin 10/19/2016    Depression     GERD (gastroesophageal reflux disease)     Hearing loss in right ear     Hx of psychiatric care     Celexa, Prozac    MDS (myelodysplastic syndrome) with 5q deletion     Psychiatric problem     Therapy      Past Surgical History:   Procedure Laterality Date    BONE MARROW BIOPSY Left 6/7/2018    Procedure: Biopsy-bone marrow;  Surgeon: Gita Valdes MD;  [facility];  Service: Oncology;  Laterality: Left;    BONE MARROW BIOPSY Left 3/21/2019    Procedure: Biopsy-bone marrow;  Surgeon: Gita Valdes MD;  Location: [facility];  Service: Oncology;  Laterality: Left;    BONE MARROW BIOPSY Left 10/24/2019    Procedure: Biopsy-bone marrow;  Surgeon: Gita Valdes MD;  Location: [facility];  Service: Oncology;  Laterality: Left;  left iliac crest    BONE MARROW BIOPSY Left 4/21/2021    Procedure: Biopsy-bone marrow;  Surgeon: Gita Valdes MD;  Location: [facility] ENDO (4TH FLR);  Service: Oncology;  Laterality: Left;    BUNIONECTOMY      CAROTID ENDARTERECTOMY Left 2011    CAROTID STENT      COLONOSCOPY N/A 12/20/2016    Procedure: COLONOSCOPY;  Surgeon: PATRICIA Simpson MD;  Location: [facility] ENDO (4TH FLR);  Service: Endoscopy;  Laterality: N/A;  pt has vomiting with anesthesia in past    ENDOMETRIAL ABLATION      for endometriosis    INSERTION OF TUNNELED CENTRAL VENOUS CATHETER (CVC) WITH SUBCUTANEOUS PORT N/A 2/19/2020    Procedure: [id number];  Surgeon: Dosc Diagnostic Provider;  Location: [facility];  Service: Radiology;  Laterality: N/A;  189    TONSILLECTOMY      TYMPANOSTOMY TUBE PLACEMENT       Family History   Problem Relation Age of Onset    Heart disease Mother     Hyperlipidemia Mother     Heart disease Father     Alcohol abuse Father     Cancer Paternal Grandmother         smoker; lung?    Alcohol abuse Brother         recovering    Colon cancer Neg Hx     Breast cancer Neg Hx      Social History      Tobacco Use    Smoking status: Former Smoker     Packs/day: 0.50     Years: 36.00     Pack years: 18.00     Types: Cigarettes, Vaping with nicotine     Quit date: 3/3/2017     Years since quittin.1    Smokeless tobacco: Never Used   Substance Use Topics    Alcohol use: No    Drug use: No     Review of Systems   Constitutional: Negative for chills and fever.   HENT: Negative for facial swelling.    Respiratory: Negative for shortness of breath (improving on home O2).    Cardiovascular: Negative for chest pain.   Gastrointestinal: Negative for nausea and vomiting.   Genitourinary: Positive for decreased urine volume (poor PO intake). Negative for dysuria, flank pain and frequency.   Musculoskeletal: Positive for arthralgias, gait problem and joint swelling. Negative for back pain.   Skin: Positive for color change. Negative for rash and wound.   Neurological: Negative for weakness and numbness.   Hematological: Does not bruise/bleed easily.   Psychiatric/Behavioral: Negative for confusion.       Physical Exam     Initial Vitals [22 1134]   BP Pulse Resp Temp SpO2   (!) 166/66 80 18 98.1 °F (36.7 °C) 97 %      MAP       --         Physical Exam    Nursing note and vitals reviewed.  Constitutional: She appears well-developed and well-nourished. She is not diaphoretic. No distress.   Chronically ill appearing   HENT:   Head: Normocephalic and atraumatic.   Right Ear: External ear normal.   Left Ear: External ear normal.   Mouth/Throat: Oropharynx is clear and moist.   Eyes: Conjunctivae and EOM are normal. Pupils are equal, round, and reactive to light. No scleral icterus.   Cardiovascular: Normal rate, regular rhythm and intact distal pulses.   DP2+ good cap refill   Pulmonary/Chest: No respiratory distress. She has no wheezes. She has rhonchi (faint RLL). She has no rales.   Port R chest wall  100% 2L NC   Abdominal: Abdomen is soft. Bowel sounds are normal. There is no abdominal tenderness. There is  no rebound and no guarding.   Musculoskeletal:         General: Tenderness present. No edema. Normal range of motion.      Comments: BLE: bilat MTP joints with effusion, erythema, TTP and significant pain with ROM. Similar exam to bilat midfoot     Neurological: She is alert and oriented to person, place, and time. She has normal strength. No cranial nerve deficit or sensory deficit.   Skin: Skin is warm and dry. Capillary refill takes less than 2 seconds. No rash noted. There is erythema. No pallor.   Psychiatric: She has a normal mood and affect. Her behavior is normal. Judgment and thought content normal.             ED Course   Procedures  Labs Reviewed   CBC W/ AUTO DIFFERENTIAL - Abnormal; Notable for the following components:       Result Value    WBC 2.19 (*)     RBC 2.64 (*)     Hemoglobin 8.1 (*)     Hematocrit 24.8 (*)     RDW 15.5 (*)     Lymph % 16.0 (*)     Mono % 2.0 (*)     Basophil % 2.0 (*)     Blasts 3.0 (*)     All other components within normal limits   COMPREHENSIVE METABOLIC PANEL - Abnormal; Notable for the following components:    Sodium 133 (*)     Potassium 3.4 (*)     Chloride 91 (*)     CO2 32 (*)     Albumin 2.3 (*)     All other components within normal limits   SEDIMENTATION RATE - Abnormal; Notable for the following components:    Sed Rate 42 (*)     All other components within normal limits   C-REACTIVE PROTEIN - Abnormal; Notable for the following components:    .4 (*)     All other components within normal limits   CULTURE, BLOOD   CULTURE, BLOOD   URIC ACID   URINALYSIS, REFLEX TO URINE CULTURE   SARS-COV-2 RDRP GENE          Imaging Results          X-Ray Chest AP Portable (Final result)  Result time 04/29/22 13:37:10    Final result by Nabil Lazaro MD (04/29/22 13:37:10)                 Impression:      1. Findings concerning for developing consolidation projected over the right midlung zone.  Findings are superimposed upon chronic change or edema.  Correlation and  follow-up is advised.      Electronically signed by: Nabil Lazaro MD  Date:    04/29/2022  Time:    13:37             Narrative:    EXAMINATION:  XR CHEST AP PORTABLE    CLINICAL HISTORY:  Personal history of pneumonia (recurrent)    TECHNIQUE:  Single frontal view of the chest was performed.    COMPARISON:  04/23/2022    FINDINGS:  The cardiomediastinal silhouette is not enlarged noting magnification by technique.  There is calcification of the aorta.  Right chest wall port catheter tip projects over the distal SVC..  There is no pleural effusion.  The trachea is midline.  The lungs are symmetrically expanded bilaterally with coarse interstitial attenuation noting more focal consolidation projected over the right midlung zone.  There is bilateral basilar subsegmental atelectasis..  No large focal consolidation seen.  There is no pneumothorax.  The osseous structures are remarkable for degenerative changes..                               X-Ray Foot Complete Bilateral (Final result)  Result time 04/29/22 13:31:02    Final result by Naveen Greene MD (04/29/22 13:31:02)                 Impression:      DJD both feet, most notable at the 1st MTP joint left foot.  Probable status post left bunionectomy.    Some soft tissue swelling around both 1st MTP joints with small calcifications on the right suggesting gout.  Correlate clinically.      Electronically signed by: Naveen Greene MD  Date:    04/29/2022  Time:    13:31             Narrative:    EXAMINATION:  XR FOOT COMPLETE 3 VIEW BILATERAL    CLINICAL HISTORY:  Pain in unspecified joint    TECHNIQUE:  AP, lateral, and oblique views of both feet were performed.    COMPARISON:  None.    FINDINGS:  Left foot: Skeletal structures are intact.  No acute fracture or a dislocation is identified.  Accessory ossicle is next to the cuboid.  Small spurs are present at the posterior and plantar aspects of the calcaneus.  First MTP joint shows a degenerative joint  disease with narrowing and marginal spurring, hallux valgus position, and the small screw in the metatarsal head.  Bunionectomy was likely done.  Mild soft tissue swelling is present around the 1st MTP joint also.  Other joint spaces in left foot are better preserved.    Right foot: Skeletal structures are intact.  No acute fracture or dislocation is identified.  Small spur is present at Achilles tendon insertion.  Accessory ossicle is next to the cuboid.  Forefoot demonstrates mild hallux valgus and degenerative spurring at the 1st MTP joint.  This area also shows mild soft tissue swelling and small calcifications suggesting gout.  Correlate clinically.  Other joint spaces in the foot are satisfactory.                                 Medications   sodium chloride 0.9% bolus 1,000 mL (1,000 mLs Intravenous New Bag 4/29/22 1317)     Medical Decision Making:   History:   Old Medical Records: I decided to obtain old medical records.  Old Records Summarized: records from clinic visits and records from previous admission(s).  Initial Assessment:   Patient presents for symmetrical polyarthralgias of feet, see pic above. No constitutional symptoms, subjective improvement in recent PNA Dx. VSS on 2L NC.  Differential Diagnosis:   DDx includes reactive arthritis, gout, RA, less likely septic arthritis. Physical exam and history taking lower clinical suspicion for cellulitis, gonococcal arthritis.  Independently Interpreted Test(s):   I have ordered and independently interpreted X-rays - see prior notes.  Clinical Tests:   Lab Tests: Ordered and Reviewed  Radiological Study: Ordered and Reviewed  Other:   I have discussed this case with another health care provider.             ED Course as of 04/29/22 1508   Fri Apr 29, 2022   1355 Sed Rate(!): 42 [MF]   1355 CRP(!): 209.4 [MF]   1355 Uric Acid: 4.3 [MF]   1355 Hemoglobin(!): 8.1  Stable  [MF]   1355 WBC(!): 2.19  stable [MF]   1355 Platelets: 165  Increased from prior [MF]  "  1356 X-Ray Foot Complete Bilateral  "Some soft tissue swelling around both 1st MTP joints with small calcifications on the right suggesting gout" [MF]   1357 X-Ray Chest AP Portable  Mildly progressed appearance of R midlung consolidation [MF]   1420 SARS-CoV-2 RNA, Amplification, Qual: Negative [MF]   1430 Discussed with BMT (recently discharged from their service). They are admitting. Appreciate assistance. Patient agreed to plan of care and voiced understanding.   [MF]      ED Course User Index  [MF] PAULO Card PA-C  04/29/2022    I discussed the following case, diagnosis and plan of care with attending physician.      Clinical Impression:   Final diagnoses:  [M25.50] Polyarthralgia  [M25.50] Polyarthralgia - bilat MTP, bilat midfoot (Primary)  [Z87.01] History of pneumonia  [Z91.81] At high risk for falls  [R26.9] Gait difficulty          ED Disposition Condition    Admit               Saima Jefferson PA-C  04/29/22 1508    "

## 2022-04-29 NOTE — TELEPHONE ENCOUNTER
Patient cannot stand to transfer to the chair or bedside commode, unable to care for her self per neighbor. Will speak to NP in clinic today and call neighbor back.  [phone]    Spoke to [place], returned call to neighbor. She will call EMS to transport patient to the ED now.

## 2022-04-30 NOTE — HOSPITAL COURSE
Ms. Puente was admitted inpatient on April 29th, shortly after being discharged from the hospital, with an acute gout flare and worsening respiratory status and fever, concerning for pneumonia and acute hypoxic respiratory failure. She was initiated on colchicine and allopurinol with improvement in gout flare. Unfortunately her prolonged hospital course was complicated by worsening respiratory status, requiring supplemental O2 and ongoing IV antibiotics. Bcx remained negative, but imaging revealed worsening RML consolidation. Ultimately, pulmonary was consulted and the patient underwent on 5/11 that was non-revealing of infectious source/malignancy, but did revealed inflammation. The patient was started on a steroid taper with some moderate improvement in her respiratory status and ability to participate with therapy teams. Additional complications this hospitalization included poor PO intake/appetite, which have also improved with steroids and the addition of remeron; and resistant hypertension, which required changes to her anti-HTN regimen and an addition of nifidepine XL. Throughout stay, given her physical deconditioning, PT/OT continued to recommend SNF post-discharge. She was medically ready for discharge for several days prior to SNF bed availability and ultimately d/c'd to SNF on 5/19 in improved condition.

## 2022-04-30 NOTE — PLAN OF CARE
Problem: Physical Therapy  Goal: Physical Therapy Goal  Description: Goals to be met by: 22    Patient will increase functional independence with mobility by performin. Supine to sit with modified independence  2. Sit to supine with modified independence  3. Sit to stand transfer with supervision  4. Bed to chair transfer with contact guard assistance using LRAD as needed  5. Gait  x 10 feet with minimum assistance using LRAD as needed  6. Ascend/descend 1 flight of stair with appropriate handrails minimum assistance using LRAD as needed  7. Lower extremity exercise program x10 reps per handout, with IND    Outcome: Ongoing, Progressing     Pt tolerated PT session well.      All needs met, all questions answered.  Kalia Wahl PT, DPT

## 2022-04-30 NOTE — PLAN OF CARE
Problem: Occupational Therapy  Goal: Occupational Therapy Goal  Description: Goals to be met by: 5/14/22     Patient will increase functional independence with ADLs by performing:    LE Dressing with Set-up Assistance.  Grooming while standing at sink with Stand-by Assistance.  Toileting from toilet with Minimal Assistance for hygiene and clothing management.   Toilet transfer to toilet with Stand-by Assistance.  Functional mobility of household and community distance with  minimum assistance and AD as needed      Outcome: Ongoing, Progressing

## 2022-04-30 NOTE — ASSESSMENT & PLAN NOTE
- patient reports difficulty bearing weight on bilat feet due to pain  - x-ray showing soft tissue edema suggestive of gout flare  - uric acid wnl  - started colchicine end date tentative for 5/3/22, can extended if needed  -may need to start allopurinol 100 mg daily for prevention since this is patient's 3rd gout flare

## 2022-04-30 NOTE — PT/OT/SLP EVAL
Physical Therapy  Evaluation and Treatment    Patient Name:  Susan Puente   MRN:  [id number]    Recommendations:     Discharge Recommendations:  nursing facility, skilled   Discharge Equipment Recommendations: bedside commode, walker, rolling   Barriers to discharge: decreased functional mobility, fall risk, decreased caregiver support and inaccessible home    Assessment:     Susan Puente is a 71 y.o. female admitted with a medical diagnosis of Acute gout of foot.  Pt demonstrates the below listed impairments with decreased tolerance to functional mobility, pain and balance instability being the most limiting.  Pt demonstrates fair tolerance to edge of bed mobility and is severely limited by the pain in her feet at this time.  Pt demonstrates the below listed deficits with her transfers, she is not safe to return home at this time.  Pt is not safe for home discharge at this time due to patient's status as: a fall risk, patient has increased pain and pt has no 24/7 assist and requires skilled PT.      Impairments and functional limitations:  weakness, impaired endurance, impaired self care skills, impaired functional mobilty, gait instability, impaired balance, decreased lower extremity function, pain, impaired skin.  These deficits affect their roles and responsibilities in which they were able to complete prior to admit.  Rehab Prognosis:   Good ; patient would benefit from acute skilled PT services 3 x/week to address these deficits, improve quality of life, focus on recovery of impairments, provide patient/caregiver education, reduce fall risk, and reach maximum level of function.  Pt is highly  motivated to participated in skilled PT.    Recent Surgery:   * No surgery found *      Plan:     During this hospitalization, patient to be seen 3 x/week to address the identified rehab impairments via gait training, therapeutic activities, therapeutic exercises, neuromuscular re-education and progress  "toward the following goals:    · Plan of Care Expires:  05/30/22    Subjective     Chief Complaint: "It's just my feet"  Patient/Family Comments/Goals: Progress to SNF  Pain/Comfort:  · Pain Rating 1: 7/10  · Location - Side 1: Bilateral  · Location - Orientation 1: generalized  · Location 1: foot (L > R)  · Pain Addressed 1: Reposition, Distraction, Cessation of Activity  · Pain Rating Post-Intervention 1: other (see comments) (not rated)    Patients cultural, spiritual, [employer]ian conflicts given the current situation: no    Living Environment:  Patient lives alone in two story home, some steps with No hand rail, 19 steps up with unilateral handrail and no landing, Bathroom on 2 floor, Bedroom on 2 floor, walk in shower.   Pt utilizes No AD for ambulation of home distances. Pt utilizes rollator for ambulation of community distances  Prior to admission, patient was independent with ADLs.   DME owned: bath bench, rollator.   DME not currently used: n/a.   Upon discharge, patient will have assistance from none, has not needed with no 24/7 assist.     Objective:     Communicated with nursing prior to session.  Patient found HOB elevated with oxygen, bed alarm  upon PT entry to room.    General Precautions: Standard, fall   Orthopedic Precautions:N/A   Braces: N/A   Oxygen Device:      Exams:  · Cognitive Exam:  Patient is oriented to Person, Place, Time and Situation  · Command following: Patient follows 100% of commands   · B LE ROM: WFL  · B LE Strength: grossly 4+/5 ankles not tested due to pain in feet  · Postural Exam:  Patient presented with the following abnormalities:    · -       Rounded shoulders  · -       Forward head  · Sensation:    · -       Intact    Functional Mobility:  · Bed Mobility:  Rolling Right: supervision  · Scooting: supervision  · Supine to Sit: supervision  · Sit to Supine: supervision  · Head of bed position: HOB elevated   · EOB for 10min with sup    · Transfers:  Sit to Stand: maximal " assistance with rolling walker with cues for hand placement and foot placement   · Pt reaches 75% of the way to full standing, she is noted to hyperextend B knees and is forward flexed, unable to extend trunk and hips     · Gait: n/a, pt not safe for ambulation    · Balance:   Position Score Time   Static Sitting GOOD: Takes MODERATE challenges n/a   Dynamic Sitting GOOD: Maintains balance through MODERATE excursions of active trunk motion 10 minute(s)   Static Standing POOR-: MAXIMAL assist to maintain  ~5 seconds   Dynamic Standing n/a: dependent n/a     Therapeutic Activities:  Patient educated on role of acute care PT and PT POC, safety while in hospital including calling nurse for mobility, call light usage, benefits of out of bed mobility, walker management, breathing technique, fall risk, bed mobility , transfers, positioning, posture, risks of prolonged bed rest, possible discharge disposition , weight bearing restrictions and benefits of continued PT by explanation and demonstration.    Patient demonstrates good understanding of education provided this day.   Whiteboard updated    Therapeutic Exercises:  n/a    AM-PAC 6 CLICK MOBILITY  Total Score:11     Patient left HOB elevated with all lines intact, call button in reach and RN notified.    GOALS:   Multidisciplinary Problems     Physical Therapy Goals        Problem: Physical Therapy    Goal Priority Disciplines Outcome Goal Variances Interventions   Physical Therapy Goal     PT, PT/OT Ongoing, Progressing     Description: Goals to be met by: 22    Patient will increase functional independence with mobility by performin. Supine to sit with modified independence  2. Sit to supine with modified independence  3. Sit to stand transfer with supervision  4. Bed to chair transfer with contact guard assistance using LRAD as needed  5. Gait  x 10 feet with minimum assistance using LRAD as needed  6. Ascend/descend 1 flight of stair with appropriate  handrails minimum assistance using LRAD as needed  7. Lower extremity exercise program x10 reps per handout, with IND                     History:     Past Medical History:   Diagnosis Date    Allergic rhinitis     Allergy     Anemia     Anxiety     CAD (coronary artery disease)     Carotid stenosis     Colon polyps 2016    Controlled type 2 diabetes mellitus without complication, without long-term current use of insulin 10/19/2016    Depression     GERD (gastroesophageal reflux disease)     Hearing loss in right ear     Hx of psychiatric care     Celexa, Prozac    MDS (myelodysplastic syndrome) with 5q deletion     Psychiatric problem     Therapy        Past Surgical History:   Procedure Laterality Date    BONE MARROW BIOPSY Left 6/7/2018    Procedure: Biopsy-bone marrow;  Surgeon: Gita Valdes MD;  [facility];  Service: Oncology;  Laterality: Left;    BONE MARROW BIOPSY Left 3/21/2019    Procedure: Biopsy-bone marrow;  Surgeon: Gita Valdes MD;  [facility];  Service: Oncology;  Laterality: Left;    BONE MARROW BIOPSY Left 10/24/2019    Procedure: Biopsy-bone marrow;  Surgeon: Gita Valdes MD;  [facility];  Service: Oncology;  Laterality: Left;  left iliac crest    BONE MARROW BIOPSY Left 4/21/2021    Procedure: Biopsy-bone marrow;  Surgeon: Gita Valdes MD;  Location: [facility] ENDO (4TH FLR);  Service: Oncology;  Laterality: Left;    BUNIONECTOMY      CAROTID ENDARTERECTOMY Left 2011    CAROTID STENT      COLONOSCOPY N/A 12/20/2016    Procedure: COLONOSCOPY;  Surgeon: PATRCIIA Simpson MD;  Location: [facility] ENDO (4TH FLR);  Service: Endoscopy;  Laterality: N/A;  pt has vomiting with anesthesia in past    ENDOMETRIAL ABLATION      for endometriosis    INSERTION OF TUNNELED CENTRAL VENOUS CATHETER (CVC) WITH SUBCUTANEOUS PORT N/A 2/19/2020    Procedure: [id number];  Surgeon: Dosc Diagnostic Provider;  Location: [facility] OR [address] [place] [address];  Service:  Radiology;  Laterality: N/A;  189    TONSILLECTOMY      TYMPANOSTOMY TUBE PLACEMENT         Time Tracking:     PT Received On: 04/30/22  PT Start Time: 0802     PT Stop Time: 0821  PT Total Time (min): 19 min     Billable Minutes: Evaluation 10 and Therapeutic Activity 9    04/30/2022

## 2022-04-30 NOTE — PROGRESS NOTES
[facility] - Oncology ([facility])  Hematology  Bone Marrow Transplant  Progress Note    Patient Name: Susan Puente  Admission Date: 4/29/2022  Hospital Length of Stay: 0 days  Code Status: DNR    Subjective:     Interval History: patient reports that she is still having pain in her right foot big toe and left foot lateral aspect.  She reports that she is unable to ambulate or perform transfers without assistance.  He denies fevers, chills, nausea, vomiting abdominal pain.  Reports good appetite.  She is due for cycle 40 of azacitidine on 05/02/2022.  This will need to be postponed.    Objective:     Vital Signs (Most Recent):  Temp: 98.2 °F (36.8 °C) (04/30/22 0734)  Pulse: 92 (04/30/22 0734)  Resp: 19 (04/30/22 0734)  BP: (!) 174/79 (04/30/22 0734)  SpO2: 95 % (04/30/22 0734)   Vital Signs (24h Range):  Temp:  [98.1 °F (36.7 °C)-99.1 °F (37.3 °C)] 98.2 °F (36.8 °C)  Pulse:  [80-93] 92  Resp:  [18-24] 19  SpO2:  [94 %-99 %] 95 %  BP: (145-189)/(63-81) 174/79     Weight: 69.3 kg (152 lb 12.5 oz)  Body mass index is 25.42 kg/m².  Body surface area is 1.78 meters squared.    ECOG SCORE           3 i    Intake/Output - Last 3 Shifts         04/28 0700 04/29 0659 04/29 0700 04/30 0659 04/30 0700 05/01 0659    IV Piggyback  1000     Total Intake(mL/kg)  1000 (14.4)     Net  +1000            Urine Occurrence  243 x             Physical Exam  Alert awake oriented x3  Weak  Regular rate and rhythm  Lungs clear to auscultation bilaterally  Abdomen soft nontender  No lower extremity edema  Tenderness over right foot big toe and left foot lateral aspect.    Significant Labs:   All pertinent labs from the last 24 hours have been reviewed.    Diagnostic Results:  I have reviewed all pertinent imaging results/findings within the past 24 hours.No    Assessment/Plan:     * Acute gout of foot  - patient reports difficulty bearing weight on bilat feet due to pain  - x-ray showing soft tissue edema suggestive of gout flare  -  uric acid wnl  - started colchicine end date tentative for 5/3/22, can extended if needed  -may need to start allopurinol 100 mg daily for prevention since this is patient's 3rd gout flare    Physical debility  - reports difficulty with ambulation due to pain and weakness  - PT/OT consulted for SNF consideration    Pancytopenia due to antineoplastic chemotherapy  Transfuse for hgb < 7 or plts < 10K  Daily cbc while inpatient  Continue acyclovir and fluconazole ppx  Will resume cipro ppx upon completion of LVQ for CAP    Acute respiratory failure with hypoxia  Patient with Hypoxic Respiratory failure which is Acute.  she is not on home oxygen. Supplemental oxygen was provided and noted-  .   Signs/symptoms of respiratory failure include- tachypnea and increased work of breathing. Contributing diagnoses includes - ARDS Labs and images were reviewed. Patient Has not had a recent ABG. Will treat underlying causes and adjust management of respiratory failure as follows- performed 6 minute walk test at previous admission and patient qualified for home O2.    CAP (community acquired pneumonia)  - Pt with 2 weeks of productive cough, nasal congestion  - Upon arrival to the hospial she was hypoxic requiring 3L NC, unable to wean O2 today  - CXR with likely develop consolidation  - CTA shows New patchy consolidative opacities in the in the right upper and posterior right lower lobes.  Findings suggestive infectious etiology such as pneumonia, multifocal pneumonia.  Also consider atypical given patient's reported immunocompromised status.  Aspiration could present similarly.  Consider continued follow-up after acute clinical illness has resolved to ensure resolution. New mediastinal and bilateral hilar lymphadenopathy, likely reactive.  - completed azithromycin.   - changed cefepime to oral LVQ on 4/26. Will complete a 7 day course of abx on 4/30.    Controlled type 2 diabetes mellitus without complication, without long-term  current use of insulin  Will hold home po diabetic medications  SSI, ACHS blood glucose monitoring, and diabetic diet  Goal bg 140-180    MDS (myelodysplastic syndrome)  - Primary oncologist, Dr. 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 Developed allergy and was then desensitized. Soon after developed pancytopenia and Revlimid was stopped June 2017.    - BMBX on 6/8/17 was with normal cytogenetics. 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 Of 20 metaphases, 5 were normal and 15 had a 5q deletion, NGS positive for SF3B1 and TP53 (12%). 1.4% blasts  - Received vidaza for 22 cycles, received Decitabine for C23 due to national shortage of vidaza and then back to vidaza with C24.  - completed cycle 39 on 4/8/22  -due for cycle 40 on 05/02/2022.  Will need to be postponed.    Adjustment disorder with mixed anxiety and depressed mood  - continue home celexa    Essential hypertension  Home medications include lisinopril/hctz and coreg  Coreg initiated  BP elevated and home lisinopril/hctz retstarted    CAD (coronary artery disease)  - S/P left carotid endarterectomy prior to cancer dx, s/p PCI (3 stents) >20 years ago   - Continue on ASA 81mg daily, platelets are wnl  - Initiation of statin?        VTE Risk Mitigation (From admission, onward)         Ordered     enoxaparin injection 40 mg  Daily         04/29/22 1651     IP VTE HIGH RISK PATIENT  Once         04/29/22 1651     Place sequential compression device   Until discontinued         04/29/22 0916                Disposition: BMT floor    Kourtney Ramsey MD  Bone Marrow Transplant  [facility] - Oncology ([facility])

## 2022-04-30 NOTE — PLAN OF CARE
Pt involved in plan of care and communicating needs throughout shift. Pt unable to ambulate due to acute gout. Pt able to turn independently.  Tolerating diet, voiding without difficulty into urinal. Electrolytes replaced as per protocol. All VSS; no acute events so far this shift. Pt reminded to call for help when needs assistance.  Pt remaining free from falls or injury throughout shift; bed locked and in lowest position; call light within reach.  Pt instructed to call for assistance as needed.  Q1H rounding done on pt. WCTM.

## 2022-04-30 NOTE — PT/OT/SLP EVAL
"Occupational Therapy   Evaluation    Name: Susan Puente  MRN: [id number]  Admitting Diagnosis:  Acute gout of foot  Recent Surgery: * No surgery found *      Recommendations:     Discharge Recommendations: nursing facility, skilled  Discharge Equipment Recommendations:  bedside commode  Barriers to discharge:  Decreased caregiver support, Inaccessible home environment    Assessment:     Susan Puente is a 71 y.o. female with a medical diagnosis of Acute gout of foot.  She presents with the following performance deficits affecting function: weakness, impaired endurance, impaired self care skills, impaired functional mobilty, gait instability, impaired balance, decreased lower extremity function, pain.      Pt agreeable to therapy and tolerated the session fairly well. Pt performed one sit to stand with Mod A, but was limited by gout pain in B feet. Pt able to tolerate stand for ~30 seconds before needing to sit 2* to pain. Pt requires Total A for toileting at this time due to needing a PureWick and unable [place] [facility] Pt lives alone with limited caregiver support and is not safe to d/c home at this time. Pt would benefit from continued skilled acute OT services in order to maximize independence and safety with ADLs and functional mobility to ensure safe return to PLOF in the least restrictive environment. OT recommending SNF once pt is medically appropriate for d/c.     Rehab Prognosis: Good; patient would benefit from acute skilled OT services to address these deficits and reach maximum level of function.       Plan:     Patient to be seen 3 x/week to address the above listed problems via self-care/home management, therapeutic activities, therapeutic exercises  · Plan of Care Expires: 05/30/22  · Plan of Care Reviewed with: patient    Subjective     "That sounds like heaven" when therapist explained the SNF process     Chief Complaint: Pain in B feet   Patient/Family Comments/goals: To " HPI:   Boris Lindsay is a 58year old female who presents for a complete physical exam.     Patient complains of nothing. She has been going to diabetic education.   There was confusion with her meds and she has not taken any insulin for the last 2 month No current facility-administered medications on file prior to visit.       Past Medical History:   Diagnosis Date   • Allergic rhinitis    • Anxiety state    • Depression    • DM type 2 (diabetes mellitus, type 2) ([facility] [address]    • Essential hypertension    • return to PLOF    Occupational Profile:  Living Environment: Pt lives alone in a 2SH with 19 steps to get to the 2nd floor where her bedroom and bathroom are. She also has a graded driveway with small steps to ascend with no HR. She has a walk-in shower with a bath bench.   Previous level of function: Independent with ADLs and used a rollator for ambulation   Roles and Routines: Pt still drives   Equipment Used at Home:  bath bench, rollator  Assistance upon Discharge: Pt has limited assistance from her 91 year old neighbor     Pain/Comfort:  · Pain Rating 1: 7/10  · Location - Side 1: Bilateral  · Location 1: foot  · Pain Addressed 1: Distraction  · Pain Rating Post-Intervention 1: other (see comments) (not rated)    Patients cultural, spiritual, Church conflicts given the current situation: no    Objective:     Communicated with: RN prior to session.  Patient found HOB elevated with PureWick, oxygen upon OT entry to room.    General Precautions: Standard, fall   Orthopedic Precautions:N/A   Braces: N/A  Respiratory Status: Nasal cannula, flow 2.5 L/min    Occupational Performance:    Bed Mobility:    · Patient completed Rolling/Turning to Right with stand by assistance  · Patient completed Scooting/Bridging with stand by assistance  · Patient completed Supine to Sit with stand by assistance  · Patient completed Sit to Supine with stand by assistance    Functional Mobility/Transfers:  · Patient completed Sit <> Stand Transfer with moderate assistance  with  rolling walker   · Pt able to come to full stand, but only tolerated for ~30 seconds before needing to sit.   · Physical and verbal cues provided for upright posture and UE placement   · Pt did not want to take any steps due to pain     Activities of Daily Living:  · Feeding:  independence : To eat breakfast  · Lower Body Dressing: stand by assistance : To don socks using figure-4 technique. Pt with increased time, but kanika to complete.   · Toileting: total  BMI 32.01 kg/m²   Body mass index is 32.01 kg/m².    GENERAL: well developed, well nourished,in no apparent distress  SKIN: no rashes,no suspicious lesions  HEENT: atraumatic, normocephalic,ears and throat are clear  EYES:PERRLA, EOMI, normal optic disk,con assistance : To position and use PureWick. Pt unable to transfer to [facility] at this time due to severe B foot pain.     Cognitive/Visual Perceptual:  Cognitive/Psychosocial Skills:     -       Oriented to: Person, Place, Time and Situation   -       Follows Commands/attention:Follows multistep  commands  -       Safety awareness/insight to disability: intact   -       Mood/Affect/Coping skills/emotional control: Appropriate to situation    Physical Exam:   Balance:  Static Sitting   stand by assistance   Dynamic Sitting   stand by assistance   Static Standing   contact guard assistance   Dynamic Standing   minimum assistance     Upper Extremity Function:   Edema None noted   Sensation    -       Intact   Hand Dominance Right   LUE ROM  WFL   RUE ROM WFL   LUE Strength  WFL   RUE Strength  4-/5 shoulder flexion, all other planes WFL   LUE  Strength WFL   RUE  Strength  WFL   Fine Motor Skills     -       Intact   Gross Motor skills    WFL         AMPAC 6 Click ADL:  AMPAC Total Score: 19    Treatment & Education:   Therapist provided facilitation and instruction of proper body mechanics and fall prevention strategies during tasks listed above.   Instructed patient to sit in bedside chair daily to increase OOB/activity tolerance.   Instructed patient to use call light to have nursing staff assist with needs/transfers.   Discussed OT POC and answered all questions within OT scope of practice.   Whiteboard updated     Education:    Patient left HOB elevated with all lines intact, call button in reach and RN present    GOALS:   Multidisciplinary Problems     Occupational Therapy Goals        Problem: Occupational Therapy    Goal Priority Disciplines Outcome Interventions   Occupational Therapy Goal     OT, PT/OT Ongoing, Progressing    Description: Goals to be met by: 5/14/22     Patient will increase functional independence with ADLs by performing:    LE Dressing with Set-up Assistance.  Grooming while standing  at sink with Stand-by Assistance.  Toileting from toilet with Minimal Assistance for hygiene and clothing management.   Toilet transfer to toilet with Stand-by Assistance.  Functional mobility of household and community distance with  minimum assistance and AD as needed                       History:     Past Medical History:   Diagnosis Date    Allergic rhinitis     Allergy     Anemia     Anxiety     CAD (coronary artery disease)     Carotid stenosis     Colon polyps 2016    Controlled type 2 diabetes mellitus without complication, without long-term current use of insulin 10/19/2016    Depression     GERD (gastroesophageal reflux disease)     Hearing loss in right ear     Hx of psychiatric care     Celexa, Prozac    MDS (myelodysplastic syndrome) with 5q deletion     Psychiatric problem     Therapy        Past Surgical History:   Procedure Laterality Date    BONE MARROW BIOPSY Left 6/7/2018    Procedure: Biopsy-bone marrow;  Surgeon: Gita Valdes MD;  [facility];  Service: Oncology;  Laterality: Left;    BONE MARROW BIOPSY Left 3/21/2019    Procedure: Biopsy-bone marrow;  Surgeon: Gita Valdes MD;  [facility];  Service: Oncology;  Laterality: Left;    BONE MARROW BIOPSY Left 10/24/2019    Procedure: Biopsy-bone marrow;  Surgeon: Gita Valdes MD;  [facility];  Service: Oncology;  Laterality: Left;  left iliac crest    BONE MARROW BIOPSY Left 4/21/2021    Procedure: Biopsy-bone marrow;  Surgeon: Gita Valdes MD;  Location: [facility] ENDO (4TH FLR);  Service: Oncology;  Laterality: Left;    BUNIONECTOMY      CAROTID ENDARTERECTOMY Left 2011    CAROTID STENT      COLONOSCOPY N/A 12/20/2016    Procedure: COLONOSCOPY;  Surgeon: PATRICIA Simpson MD;  Location: [facility] ENDO (4TH FLR);  Service: Endoscopy;  Laterality: N/A;  pt has vomiting with anesthesia in past    ENDOMETRIAL ABLATION      for endometriosis    INSERTION OF TUNNELED CENTRAL VENOUS CATHETER (CVC)  hypertension      She will resume insulin and the Trulicity and continue diabetic education. Strongly encouraged her to work on Egnyte and weight loss. She has had some hematuria. She has a prior history of kidney stones.   I like her to go back t WITH SUBCUTANEOUS PORT N/A 2/19/2020    Procedure: [id number];  Surgeon: Deepa Diagnostic Provider;  Location: [facility] OR [address] [place] [address];  Service: Radiology;  Laterality: N/A;  189    TONSILLECTOMY      TYMPANOSTOMY TUBE PLACEMENT         Time Tracking:     OT Date of Treatment: 04/30/22  OT Start Time: 0911  OT Stop Time: 0936  OT Total Time (min): 25 min    Billable Minutes:Evaluation 10  Self Care/Home Management 15    4/30/2022

## 2022-04-30 NOTE — PLAN OF CARE
"AAO x4, respirations even and unlabored with no distress noted. C/O pain in feet. 1+ Edema noted, also states,"They feel cold." Erythema, Blanchable, noted to bilateral feet and sacral area.Will continue to monitor for changes.       Problem: Adult Inpatient Plan of Care  Goal: Plan of Care Review  Outcome: Ongoing, Progressing  Goal: Patient-Specific Goal (Individualized)  Outcome: Ongoing, Progressing  Goal: Absence of Hospital-Acquired Illness or Injury  Outcome: Ongoing, Progressing  Goal: Optimal Comfort and Wellbeing  Outcome: Ongoing, Progressing  Goal: Readiness for Transition of Care  Outcome: Ongoing, Progressing     Problem: Diabetes Comorbidity  Goal: Blood Glucose Level Within Targeted Range  Outcome: Ongoing, Progressing     Problem: Adjustment to Illness (Sepsis/Septic Shock)  Goal: Optimal Coping  Outcome: Ongoing, Progressing     Problem: Bleeding (Sepsis/Septic Shock)  Goal: Absence of Bleeding  Outcome: Ongoing, Progressing     Problem: Glycemic Control Impaired (Sepsis/Septic Shock)  Goal: Blood Glucose Level Within Desired Range  Outcome: Ongoing, Progressing     Problem: Infection Progression (Sepsis/Septic Shock)  Goal: Absence of Infection Signs and Symptoms  Outcome: Ongoing, Progressing     Problem: Nutrition Impaired (Sepsis/Septic Shock)  Goal: Optimal Nutrition Intake  Outcome: Ongoing, Progressing     Problem: Fluid and Electrolyte Imbalance (Acute Kidney Injury/Impairment)  Goal: Fluid and Electrolyte Balance  Outcome: Ongoing, Progressing     Problem: Oral Intake Inadequate (Acute Kidney Injury/Impairment)  Goal: Optimal Nutrition Intake  Outcome: Ongoing, Progressing     Problem: Renal Function Impairment (Acute Kidney Injury/Impairment)  Goal: Effective Renal Function  Outcome: Ongoing, Progressing     Problem: Fluid Imbalance (Pneumonia)  Goal: Fluid Balance  Outcome: Ongoing, Progressing     Problem: Infection (Pneumonia)  Goal: Resolution of Infection Signs and Symptoms  Outcome: " Ongoing, Progressing     Problem: Respiratory Compromise (Pneumonia)  Goal: Effective Oxygenation and Ventilation  Outcome: Ongoing, Progressing     Problem: Infection  Goal: Absence of Infection Signs and Symptoms  Outcome: Ongoing, Progressing

## 2022-04-30 NOTE — ASSESSMENT & PLAN NOTE
- Primary oncologist, Dr. 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 Developed allergy and was then desensitized. Soon after developed pancytopenia and Revlimid was stopped June 2017.    - BMBX on 6/8/17 was with normal cytogenetics. 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 Of 20 metaphases, 5 were normal and 15 had a 5q deletion, NGS positive for SF3B1 and TP53 (12%). 1.4% blasts  - Received vidaza for 22 cycles, received Decitabine for C23 due to national shortage of vidaza and then back to vidaza with C24.  - completed cycle 39 on 4/8/22  -due for cycle 40 on 05/02/2022.  Will need to be postponed.

## 2022-04-30 NOTE — SUBJECTIVE & OBJECTIVE
Subjective:     Interval History: patient reports that she is still having pain in her right foot big toe and left foot lateral aspect.  She reports that she is unable to ambulate or perform transfers without assistance.  He denies fevers, chills, nausea, vomiting abdominal pain.  Reports good appetite.  She is due for cycle 40 of azacitidine on 05/02/2022.  This will need to be postponed.    Objective:     Vital Signs (Most Recent):  Temp: 98.2 °F (36.8 °C) (04/30/22 0734)  Pulse: 92 (04/30/22 0734)  Resp: 19 (04/30/22 0734)  BP: (!) 174/79 (04/30/22 0734)  SpO2: 95 % (04/30/22 0734)   Vital Signs (24h Range):  Temp:  [98.1 °F (36.7 °C)-99.1 °F (37.3 °C)] 98.2 °F (36.8 °C)  Pulse:  [80-93] 92  Resp:  [18-24] 19  SpO2:  [94 %-99 %] 95 %  BP: (145-189)/(63-81) 174/79     Weight: 69.3 kg (152 lb 12.5 oz)  Body mass index is 25.42 kg/m².  Body surface area is 1.78 meters squared.    ECOG SCORE           3 i    Intake/Output - Last 3 Shifts         04/28 0700 04/29 0659 04/29 0700 04/30 0659 04/30 0700 05/01 0659    IV Piggyback  1000     Total Intake(mL/kg)  1000 (14.4)     Net  +1000            Urine Occurrence  243 x             Physical Exam  Alert awake oriented x3  Weak  Regular rate and rhythm  Lungs clear to auscultation bilaterally  Abdomen soft nontender  No lower extremity edema  Tenderness over right foot big toe and left foot lateral aspect.    Significant Labs:   All pertinent labs from the last 24 hours have been reviewed.    Diagnostic Results:  I have reviewed all pertinent imaging results/findings within the past 24 hours.No

## 2022-05-01 PROBLEM — R50.9 FEVER: Status: ACTIVE | Noted: 2022-01-01

## 2022-05-01 NOTE — SUBJECTIVE & OBJECTIVE
Subjective:     Interval History: Patient reports improved pain upper feet bilaterally. Otherwise asymp. She had a temperature of 101.2° yesterday.  Blood cultures pending.  The diff or loose stools.  Started cefepime.  Colchicine dose given today.    Objective:     Vital Signs (Most Recent):  Temp: 97.9 °F (36.6 °C) (05/01/22 0726)  Pulse: 77 (05/01/22 0726)  Resp: 19 (05/01/22 0726)  BP: (!) 178/72 (05/01/22 0726)  SpO2: (!) 94 % (05/01/22 0726)   Vital Signs (24h Range):  Temp:  [97.6 °F (36.4 °C)-101.2 °F (38.4 °C)] 97.9 °F (36.6 °C)  Pulse:  [75-89] 77  Resp:  [18-20] 19  SpO2:  [93 %-98 %] 94 %  BP: (141-178)/(63-77) 178/72     Weight: 69.3 kg (152 lb 12.5 oz)  Body mass index is 25.42 kg/m².  Body surface area is 1.78 meters squared.    ECOG SCORE           3    Intake/Output - Last 3 Shifts         04/29 0700  04/30 0659 04/30 0700 05/01 0659 05/01 0700  05/02 0659    P.O.  1440 240    IV Piggyback 1000      Total Intake(mL/kg) 1000 (14.4) 1440 (20.8) 240 (3.5)    Urine (mL/kg/hr)  500 (0.3)     Stool  0     Total Output  500     Net +1000 +940 +240           Urine Occurrence 243 x 1 x     Stool Occurrence  2 x             Physical Exam  Alert awake oriented x3  Regular rate and rhythm   Lungs clear to auscultation bilaterally  Abdomen soft nontender  No lower extremity edema  Lower extremity tenderness and erythema of toe improved    Significant Labs:   All pertinent labs from the last 24 hours have been reviewed.    Diagnostic Results:  I have reviewed all pertinent imaging results/findings within the past 24 hours.

## 2022-05-01 NOTE — PLAN OF CARE
Pt involved in plan of care and communicating needs throughout shift. A-febrile during shift. Cefepime started. Pt unable to ambulate due to acute gout. Pt able to turn independently.  Tolerating diet, voiding without difficulty into urinal.  All VSS; no acute events so far this shift. Pt reminded to call for help when needs assistance.  Pt remaining free from falls or injury throughout shift; bed locked and in lowest position; call light within reach.  Pt instructed to call for assistance as needed.  Q1H rounding done on pt. WCTM.

## 2022-05-01 NOTE — ASSESSMENT & PLAN NOTE
- Pt with 2 weeks of productive cough, nasal congestion  - Upon arrival to the hospial she was hypoxic requiring 3L NC, unable to wean O2 today  - CXR with likely develop consolidation  - CTA shows New patchy consolidative opacities in the in the right upper and posterior right lower lobes.  Findings suggestive infectious etiology such as pneumonia, multifocal pneumonia.  Also consider atypical given patient's reported immunocompromised status.  Aspiration could present similarly.  Consider continued follow-up after acute clinical illness has resolved to ensure resolution. New mediastinal and bilateral hilar lymphadenopathy, likely reactive.  - completed azithromycin.   - completed 7 day course of Levaquin on 4/30.

## 2022-05-01 NOTE — PLAN OF CARE
Problem: Adult Inpatient Plan of Care  Goal: Plan of Care Review  Outcome: Ongoing, Progressing  Goal: Patient-Specific Goal (Individualized)  Outcome: Ongoing, Progressing  Goal: Absence of Hospital-Acquired Illness or Injury  Outcome: Ongoing, Progressing  Goal: Optimal Comfort and Wellbeing  Outcome: Ongoing, Progressing  Goal: Readiness for Transition of Care  Outcome: Ongoing, Progressing     Problem: Diabetes Comorbidity  Goal: Blood Glucose Level Within Targeted Range  Outcome: Ongoing, Progressing     Problem: Adjustment to Illness (Sepsis/Septic Shock)  Goal: Optimal Coping  Outcome: Ongoing, Progressing     Problem: Bleeding (Sepsis/Septic Shock)  Goal: Absence of Bleeding  Outcome: Ongoing, Progressing     Problem: Glycemic Control Impaired (Sepsis/Septic Shock)  Goal: Blood Glucose Level Within Desired Range  Outcome: Ongoing, Progressing     Problem: Infection Progression (Sepsis/Septic Shock)  Goal: Absence of Infection Signs and Symptoms  Outcome: Ongoing, Progressing     Problem: Nutrition Impaired (Sepsis/Septic Shock)  Goal: Optimal Nutrition Intake  Outcome: Ongoing, Progressing     Problem: Fluid and Electrolyte Imbalance (Acute Kidney Injury/Impairment)  Goal: Fluid and Electrolyte Balance  Outcome: Ongoing, Progressing     Problem: Oral Intake Inadequate (Acute Kidney Injury/Impairment)  Goal: Optimal Nutrition Intake  Outcome: Ongoing, Progressing     Problem: Renal Function Impairment (Acute Kidney Injury/Impairment)  Goal: Effective Renal Function  Outcome: Ongoing, Progressing     Problem: Fluid Imbalance (Pneumonia)  Goal: Fluid Balance  Outcome: Ongoing, Progressing     Problem: Infection (Pneumonia)  Goal: Resolution of Infection Signs and Symptoms  Outcome: Ongoing, Progressing     Problem: Respiratory Compromise (Pneumonia)  Goal: Effective Oxygenation and Ventilation  Outcome: Ongoing, Progressing     Problem: Infection  Goal: Absence of Infection Signs and Symptoms  Outcome:  Ongoing, Progressing     Problem: Skin Injury Risk Increased  Goal: Skin Health and Integrity  Outcome: Ongoing, Progressing     Problem: Impaired Wound Healing  Goal: Optimal Wound Healing  Outcome: Ongoing, Progressing

## 2022-05-01 NOTE — ASSESSMENT & PLAN NOTE
Patient had Tmax 101.2 on 4/30.  Started on cefepime.  Blood cultures pending.  Ordered CT of from the stools.  Chest x-ray with pneumonia, although patient without symptoms.

## 2022-05-01 NOTE — PROGRESS NOTES
While tested [facility] - Oncology ([facility])  Hematology  Bone Marrow Transplant  Progress Note    Patient Name: Susan Puente  Admission Date: 4/29/2022  Hospital Length of Stay: 0 days  Code Status: DNR    Subjective:     Interval History: Patient reports improved pain upper feet bilaterally. Otherwise asymp. She had a temperature of 101.2° yesterday.  Blood cultures pending.  The diff or loose stools.  Started cefepime.  Colchicine dose given today.    Objective:     Vital Signs (Most Recent):  Temp: 97.9 °F (36.6 °C) (05/01/22 0726)  Pulse: 77 (05/01/22 0726)  Resp: 19 (05/01/22 0726)  BP: (!) 178/72 (05/01/22 0726)  SpO2: (!) 94 % (05/01/22 0726)   Vital Signs (24h Range):  Temp:  [97.6 °F (36.4 °C)-101.2 °F (38.4 °C)] 97.9 °F (36.6 °C)  Pulse:  [75-89] 77  Resp:  [18-20] 19  SpO2:  [93 %-98 %] 94 %  BP: (141-178)/(63-77) 178/72     Weight: 69.3 kg (152 lb 12.5 oz)  Body mass index is 25.42 kg/m².  Body surface area is 1.78 meters squared.    ECOG SCORE           3    Intake/Output - Last 3 Shifts         04/29 0700  04/30 0659 04/30 0700  05/01 0659 05/01 0700  05/02 0659    P.O.  1440 240    IV Piggyback 1000      Total Intake(mL/kg) 1000 (14.4) 1440 (20.8) 240 (3.5)    Urine (mL/kg/hr)  500 (0.3)     Stool  0     Total Output  500     Net +1000 +940 +240           Urine Occurrence 243 x 1 x     Stool Occurrence  2 x             Physical Exam  Alert awake oriented x3  Regular rate and rhythm   Lungs clear to auscultation bilaterally  Abdomen soft nontender  No lower extremity edema  Lower extremity tenderness and erythema of toe improved    Significant Labs:   All pertinent labs from the last 24 hours have been reviewed.    Diagnostic Results:  I have reviewed all pertinent imaging results/findings within the past 24 hours.    Assessment/Plan:     * Acute gout of foot  - patient reports difficulty bearing weight on bilat feet due to pain, improving  - x-ray showing soft tissue edema suggestive of gout  flare  - uric acid wnl  - dosing colchicine loading dose 0.6mg given multiple medication interactions  - start allopurinol 100 mg daily for prevention    Fever  Patient had Tmax 101.2 on 4/30.  Started on cefepime.  Blood cultures pending.  Ordered CT of from the stools.  Chest x-ray with pneumonia, although patient without symptoms.    Physical debility  - reports difficulty with ambulation due to pain and weakness  - PT/OT consulted for SNF consideration    Pancytopenia due to antineoplastic chemotherapy  Transfuse for hgb < 7 or plts < 10K  Daily cbc while inpatient  Continue acyclovir and fluconazole ppx  Will resume cipro ppx upon completion of LVQ for CAP    Acute respiratory failure with hypoxia  Patient with Hypoxic Respiratory failure which is Acute.  she is not on home oxygen. Supplemental oxygen was provided and noted-  .   Signs/symptoms of respiratory failure include- tachypnea and increased work of breathing. Contributing diagnoses includes - ARDS Labs and images were reviewed. Patient Has not had a recent ABG. Will treat underlying causes and adjust management of respiratory failure as follows- performed 6 minute walk test at previous admission and patient qualified for home O2.    CAP (community acquired pneumonia)  - Pt with 2 weeks of productive cough, nasal congestion  - Upon arrival to the hospial she was hypoxic requiring 3L NC, unable to wean O2 today  - CXR with likely develop consolidation  - CTA shows New patchy consolidative opacities in the in the right upper and posterior right lower lobes.  Findings suggestive infectious etiology such as pneumonia, multifocal pneumonia.  Also consider atypical given patient's reported immunocompromised status.  Aspiration could present similarly.  Consider continued follow-up after acute clinical illness has resolved to ensure resolution. New mediastinal and bilateral hilar lymphadenopathy, likely reactive.  - completed azithromycin.   - completed 7 day  course of Levaquin on 4/30.    Controlled type 2 diabetes mellitus without complication, without long-term current use of insulin  Will hold home po diabetic medications  SSI, ACHS blood glucose monitoring, and diabetic diet  Goal bg 140-180    MDS (myelodysplastic syndrome)  - Primary oncologist, Dr. 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 Developed allergy and was then desensitized. Soon after developed pancytopenia and Revlimid was stopped June 2017.    - BMBX on 6/8/17 was with normal cytogenetics. 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 Of 20 metaphases, 5 were normal and 15 had a 5q deletion, NGS positive for SF3B1 and TP53 (12%). 1.4% blasts  - Received vidaza for 22 cycles, received Decitabine for C23 due to national shortage of vidaza and then back to vidaza with C24.  - completed cycle 39 on 4/8/22  -due for cycle 40 on 05/02/2022.  Will need to be postponed.    Adjustment disorder with mixed anxiety and depressed mood  - continue home celexa    Essential hypertension  Home medications include lisinopril/hctz and coreg  Coreg initiated  BP elevated and home lisinopril/hctz retstarted    CAD (coronary artery disease)  - S/P left carotid endarterectomy prior to cancer dx, s/p PCI (3 stents) >20 years ago   - Continue on ASA 81mg daily, platelets are wnl  - Initiation of statin?        VTE Risk Mitigation (From admission, onward)         Ordered     enoxaparin injection 40 mg  Daily          04/29/22 1651     IP VTE HIGH RISK PATIENT  Once         04/29/22 1651     Place sequential compression device  Until discontinued         04/29/22 1651                Disposition: BMT    Kourtney Ramsey MD  Bone Marrow Transplant  [facility] - Oncology ([facility])

## 2022-05-01 NOTE — ASSESSMENT & PLAN NOTE
- patient reports difficulty bearing weight on bilat feet due to pain, improving  - x-ray showing soft tissue edema suggestive of gout flare  - uric acid wnl  - dosing colchicine loading dose 0.6mg given multiple medication interactions  - start allopurinol 100 mg daily for prevention

## 2022-05-01 NOTE — NURSING
Received AAO x4, respirations even and unlabored with no distress noted on 2.5L O2 via NC. C/O pain in feet and legs. Will continue to monitor for changes

## 2022-05-02 NOTE — PLAN OF CARE
Pt alert and oriented x4. 97% saturation on 02 2L NC. IV ABT given, no adverse effect noted.BP elevated throughout this shift, Provider notified. New BP parameter given. Pt turned and repositioned. Adequate urine output voided this shift. No BM. Medicated x1 with PRN Tramadol for c/o pain to lower extremities, pain med effective. Afebrile. Safety precaution in place.

## 2022-05-02 NOTE — ASSESSMENT & PLAN NOTE
- reports difficulty with ambulation due to pain and weakness  - PT/OT consulted. Recommending SNF.

## 2022-05-02 NOTE — ASSESSMENT & PLAN NOTE
- patient reports difficulty bearing weight on bilat feet due to pain, improving  - x-ray showing soft tissue edema suggestive of gout flare  - uric acid wnl  - continue colchicine 0.6mg daily until flare resolves  - start allopurinol 100 mg daily for prevention

## 2022-05-02 NOTE — ASSESSMENT & PLAN NOTE
- Pt with 2 weeks of productive cough, nasal congestion  - Upon arrival to the hospial she was hypoxic requiring 3L NC, unable to wean O2 today  - CXR with likely develop consolidation  - CTA shows New patchy consolidative opacities in the in the right upper and posterior right lower lobes.  Findings suggestive infectious etiology such as pneumonia, multifocal pneumonia.  Also consider atypical given patient's reported immunocompromised status.  Aspiration could present similarly.  Consider continued follow-up after acute clinical illness has resolved to ensure resolution. New mediastinal and bilateral hilar lymphadenopathy, likely reactive.  - completed azithromycin.   - completed 7 day course of Levaquin on 4/30.  - CXR on admission showing possible developing consolidation.   - currently on cefepime due to fever

## 2022-05-02 NOTE — ASSESSMENT & PLAN NOTE
Transfuse for hgb < 7 or plts < 10K  Daily cbc while inpatient  Continue acyclovir and fluconazole ppx  Will resume cipro ppx when cefepime is stopped

## 2022-05-02 NOTE — PROGRESS NOTES
Oncology LCSW faxed SNF referral, clinical and insurance information to [facility]. LCSW will follow up with [facility] intake personnel.

## 2022-05-02 NOTE — ASSESSMENT & PLAN NOTE
- Primary oncologist, Dr. 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 Developed allergy and was then desensitized. Soon after developed pancytopenia and Revlimid was stopped June 2017.    - BMBX on 6/8/17 was with normal cytogenetics. 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 Of 20 metaphases, 5 were normal and 15 had a 5q deletion, NGS positive for SF3B1 and TP53 (12%). 1.4% blasts  - Received vidaza for 22 cycles, received Decitabine for C23 due to national shortage of vidaza and then back to vidaza with C24.  - completed cycle 39 on 4/8/22  - due for cycle 40 on 05/02/2022.  Will need to be postponed.

## 2022-05-02 NOTE — PT/OT/SLP PROGRESS
Physical Therapy Treatment    Patient Name:  Susan Puente   MRN:  [id number]    Recent Surgery: * No surgery found *      Recommendations:     Discharge Recommendations:  nursing facility, skilled   Discharge Equipment Recommendations: bedside commode, walker, rolling   Barriers to discharge: Inaccessible home and Decreased caregiver support    Assessment:     Susan Puente is a 71 y.o. female admitted with a medical diagnosis of Acute gout of foot.  She presents with the following impairments/functional limitations:  weakness, impaired endurance, pain, impaired self care skills, impaired functional mobilty, gait instability, decreased lower extremity function. Susan Puente would benefit from acute PT intervention to address listed functional deficits, provide patient/caregiver education, reduce fall risk, and maximize (I) and safety with functional mobility.    Pt met with HOB elevated and agreeable to PT treatment. Today's PT treatment focus was on therapeutic exercises to strengthen B LEs and improve function. Mobility was deferred today secondary to severe B feet pain. Pt participates well in session and is eager for OOB mobility once pain improves.    Pt is progressing towards acute PT goals appropriately and continues to benefit from acute PT sessions. After hospital discharge, pt would benefit from SNF to maximize rehab potential.    Rehab Prognosis: Good; patient would benefit from acute skilled PT services to address these deficits and reach maximum level of function.      Plan:     During this hospitalization, patient to be seen 3 x/week to address the identified rehab impairments via gait training, therapeutic activities, therapeutic exercises, neuromuscular re-education and progress toward the following goals:    · Plan of Care Expires:  05/30/22    This plan of care has been discussed with the patient/caregiver, who was included in its development and is in agreement with the  "identified goals and treatment plan.     Subjective     Communicated with RN prior to session.  Patient agreeable to participate.     Pain/Comfort:  · Pain Rating 1: 8/10  · Location - Side 1: Bilateral  · Location - Orientation 1: generalized  · Location 1: foot (L>R)  · Pain Addressed 1: Reposition, Distraction, Cessation of Activity  · Pain Rating Post-Intervention 1:  (unrated)    Chief Complaint: Acute gout of foot  Patient/Family Comments/goals: "I'm normally so active. I can't even stand up right now because of the pain."      Objective:     Patient found HOB elevated with peripheral IV, oxygen, bed alarm  upon PT entry to room.    General Precautions: Standard, fall   Orthopedic Precautions:N/A   Braces: N/A         Exams:     Cognition:  o Patient is oriented to Person, Place, Time, Situation, follows commands 100% of the time    Functional Mobility:    · Deferred- focus on therapeutic exercises      Therapeutic Activities/Exercises      Patient performed the following exercises with therapist assist and red theraband:  o Resisted PF: 1x15 reps B  o Resisted DF: 1x15 reps B  o Resisted inversion: 1x15 reps B  o Resisted eversion: 1x15 reps B  o Resisted B UE horizonal abduction: 1x10 reps B  o Resisted elbow extension: 1x10 reps B   Writing therapist instructed pt to perform above exercises at least 2x daily   Writing therapist discussed the importance of repositioning at least every 2 hours to reduce the risk of skin breakdown during hospital stay. Pt agreeable and was able to repeat back to therapist at the end of session   Patient educated on the importance of early mobility to prevent functional decline during hospital stay   Patient was instructed to utilize staff assistance for mobility/transfers.   Patient educated on PT POC and role of PT in acute care   White board updated regarding patient's safest level of mobility with staff assistance, RN also updated.     AM-PAC 6 CLICK " MOBILITY  Turning over in bed (including adjusting bedclothes, sheets and blankets)?: 3  Sitting down on and standing up from a chair with arms (e.g., wheelchair, bedside commode, etc.): 2  Moving from lying on back to sitting on the side of the bed?: 3  Moving to and from a bed to a chair (including a wheelchair)?: 1  Need to walk in hospital room?: 1  Climbing 3-5 steps with a railing?: 1  Basic Mobility Total Score: 11     Patient left HOB elevated with all lines intact, call button in reach, bed alarm on and RN notified.        History/Goals:     PAST MEDICAL HISTORY:  Past Medical History:   Diagnosis Date    Allergic rhinitis     Allergy     Anemia     Anxiety     CAD (coronary artery disease)     Carotid stenosis     Colon polyps 2016    Controlled type 2 diabetes mellitus without complication, without long-term current use of insulin 10/19/2016    Depression     GERD (gastroesophageal reflux disease)     Hearing loss in right ear     Hx of psychiatric care     Celexa, Prozac    MDS (myelodysplastic syndrome) with 5q deletion     Psychiatric problem     Therapy        Past Surgical History:   Procedure Laterality Date    BONE MARROW BIOPSY Left 6/7/2018    Procedure: Biopsy-bone marrow;  Surgeon: Gita Valdes MD;  Location: [facility] OR 2ND FLR;  Service: Oncology;  Laterality: Left;    BONE MARROW BIOPSY Left 3/21/2019    Procedure: Biopsy-bone marrow;  Surgeon: Gita Valdes MD;  Location: [facility] OR 2ND FLR;  Service: Oncology;  Laterality: Left;    BONE MARROW BIOPSY Left 10/24/2019    Procedure: Biopsy-bone marrow;  Surgeon: Gita Valdes MD;  Location: [facility] OR 2ND FLR;  Service: Oncology;  Laterality: Left;  left iliac crest    BONE MARROW BIOPSY Left 4/21/2021    Procedure: Biopsy-bone marrow;  Surgeon: Gita Valdes MD;  Location: [facility] ENDO (4TH FLR);  Service: Oncology;  Laterality: Left;    BUNIONECTOMY      CAROTID ENDARTERECTOMY Left 2011    CAROTID STENT      COLONOSCOPY N/A 12/20/2016     Procedure: COLONOSCOPY;  Surgeon: PATRICIA Simpson MD;  Location: [facility] (4TH FLR);  Service: Endoscopy;  Laterality: N/A;  pt has vomiting with anesthesia in past    ENDOMETRIAL ABLATION      for endometriosis    INSERTION OF TUNNELED CENTRAL VENOUS CATHETER (CVC) WITH SUBCUTANEOUS PORT N/A 2020    Procedure: [id number];  Surgeon: Deepa Diagnostic Provider;  Location: [facility] OR 2ND FLR;  Service: Radiology;  Laterality: N/A;  189    TONSILLECTOMY      TYMPANOSTOMY TUBE PLACEMENT         GOALS:   Multidisciplinary Problems     Physical Therapy Goals        Problem: Physical Therapy    Goal Priority Disciplines Outcome Goal Variances Interventions   Physical Therapy Goal     PT, PT/OT Ongoing, Progressing     Description: Goals to be met by: 22    Patient will increase functional independence with mobility by performin. Supine to sit with modified independence  2. Sit to supine with modified independence  3. Sit to stand transfer with supervision  4. Bed to chair transfer with contact guard assistance using LRAD as needed  5. Gait  x 10 feet with minimum assistance using LRAD as needed  6. Ascend/descend 1 flight of stair with appropriate handrails minimum assistance using LRAD as needed  7. Lower extremity exercise program x10 reps per handout, with IND                     Time Tracking:     PT Received On: 22  PT Start Time: 1334     PT Stop Time: 1410  PT Total Time (min): 36 min     Billable Minutes: Therapeutic Exercise 36      Anita Thomson, PT  2022   Pager# [phone]

## 2022-05-02 NOTE — SUBJECTIVE & OBJECTIVE
Subjective:     Interval History: Last temp was 101.2. Will continue cefepime for now and de-escalate abx if she continues to be afebrile for an additional 24 hours. Infection w/u neg thus far. SBP 170s-180s. Will increase home Coreg dose. CMP deranged. Will repeat. Transfusing 1 unit prbc for hgb of 6.9. PT/OT recs SNF.    Objective:     Vital Signs (Most Recent):  Temp: 98.4 °F (36.9 °C) (05/02/22 0713)  Pulse: 83 (05/02/22 0713)  Resp: 16 (05/02/22 0713)  BP: (!) 177/74 (05/02/22 0713)  SpO2: (!) 93 % (05/02/22 0713)   Vital Signs (24h Range):  Temp:  [97.5 °F (36.4 °C)-99.7 °F (37.6 °C)] 98.4 °F (36.9 °C)  Pulse:  [75-84] 83  Resp:  [16-20] 16  SpO2:  [93 %-98 %] 93 %  BP: (167-185)/(69-78) 177/74     Weight: 70.8 kg (156 lb 1.4 oz)  Body mass index is 25.97 kg/m².  Body surface area is 1.8 meters squared.    ECOG SCORE           [unfilled]    Intake/Output - Last 3 Shifts         04/30 0700  05/01 0659 05/01 0700  05/02 0659 05/02 0700  05/03 0659    P.O. 1440 1100     IV Piggyback       Total Intake(mL/kg) 1440 (20.3) 1100 (15.5)     Urine (mL/kg/hr) 500 (0.3) 1250 (0.7)     Stool 0      Total Output 500 1250     Net +940 -150            Urine Occurrence 1 x      Stool Occurrence 2 x 2 x             Physical Exam  Constitutional:       Appearance: She is well-developed.   HENT:      Head: Normocephalic and atraumatic.      Mouth/Throat:      Pharynx: No oropharyngeal exudate.   Eyes:      Conjunctiva/sclera: Conjunctivae normal.      Pupils: Pupils are equal, round, and reactive to light.   Cardiovascular:      Rate and Rhythm: Normal rate and regular rhythm.      Heart sounds: Normal heart sounds. No murmur heard.  Pulmonary:      Effort: Pulmonary effort is normal.      Comments: Diminished lung sounds  Abdominal:      General: Bowel sounds are normal. There is no distension.      Palpations: Abdomen is soft.      Tenderness: There is no abdominal tenderness.   Musculoskeletal:         General: No deformity.  Normal range of motion.      Cervical back: Normal range of motion and neck supple.      Comments: Redness to bilat great toes.   Skin:     General: Skin is warm and dry.      Findings: No erythema or rash.      Comments: Right chest wall port. Dressing c/d/i. No sign of infection to site.   Neurological:      Mental Status: She is alert and oriented to person, place, and time.   Psychiatric:         Behavior: Behavior normal.         Thought Content: Thought content normal.         Judgment: Judgment normal.       Significant Labs:   CBC:   Recent Labs   Lab 05/01/22  1008 05/02/22  0308   WBC 2.84* 3.64*   HGB 7.7* 6.9*   HCT 24.1* 22.0*    187    and CMP:   Recent Labs   Lab 05/01/22  1008 05/02/22  0308   * 126*   K 3.8 3.3*   CL 92* 89*   CO2 30* 30*    87   BUN 14 12   CREATININE 0.7 0.6   CALCIUM 8.7 8.7   PROT 5.4* 5.7*   ALBUMIN 2.1* 2.0*   BILITOT 0.5 0.4   ALKPHOS 78 71   AST 35 38   ALT 37 40   ANIONGAP 10 7*   EGFRNONAA >60.0 >60.0       Diagnostic Results:  I have reviewed all pertinent imaging results/findings within the past 24 hours.

## 2022-05-02 NOTE — ASSESSMENT & PLAN NOTE
Patient had Tmax 101.2 on 4/30  Started on cefepime  Blood cultures with NGTD  Chest x-ray on admission showing possible developing consolidation

## 2022-05-02 NOTE — PROGRESS NOTES
[facility] ([facility])  Hematology  Bone Marrow Transplant  Progress Note    Patient Name: Susan Puente  Admission Date: 4/29/2022  Hospital Length of Stay: 0 days  Code Status: DNR    Subjective:     Interval History: Last temp was 101.2. Will continue cefepime for now and de-escalate abx if she continues to be afebrile for an additional 24 hours. Infection w/u neg thus far. SBP 170s-180s. Will increase home Coreg dose. CMP deranged. Will repeat. Transfusing 1 unit prbc for hgb of 6.9. PT/OT recs SNF.    Objective:     Vital Signs (Most Recent):  Temp: 98.4 °F (36.9 °C) (05/02/22 0713)  Pulse: 83 (05/02/22 0713)  Resp: 16 (05/02/22 0713)  BP: (!) 177/74 (05/02/22 0713)  SpO2: (!) 93 % (05/02/22 0713)   Vital Signs (24h Range):  Temp:  [97.5 °F (36.4 °C)-99.7 °F (37.6 °C)] 98.4 °F (36.9 °C)  Pulse:  [75-84] 83  Resp:  [16-20] 16  SpO2:  [93 %-98 %] 93 %  BP: (167-185)/(69-78) 177/74     Weight: 70.8 kg (156 lb 1.4 oz)  Body mass index is 25.97 kg/m².  Body surface area is 1.8 meters squared.    ECOG SCORE           [unfilled]    Intake/Output - Last 3 Shifts         04/30 0700  05/01 0659 05/01 0700 05/02 0659 05/02 0700 05/03 0659    P.O. 1440 1100     IV Piggyback       Total Intake(mL/kg) 1440 (20.3) 1100 (15.5)     Urine (mL/kg/hr) 500 (0.3) 1250 (0.7)     Stool 0      Total Output 500 1250     Net +940 -150            Urine Occurrence 1 x      Stool Occurrence 2 x 2 x             Physical Exam  Constitutional:       Appearance: She is well-developed.   HENT:      Head: Normocephalic and atraumatic.      Mouth/Throat:      Pharynx: No oropharyngeal exudate.   Eyes:      Conjunctiva/sclera: Conjunctivae normal.      Pupils: Pupils are equal, round, and reactive to light.   Cardiovascular:      Rate and Rhythm: Normal rate and regular rhythm.      Heart sounds: Normal heart sounds. No murmur heard.  Pulmonary:      Effort: Pulmonary effort is normal.      Comments: Diminished lung  sounds  Abdominal:      General: Bowel sounds are normal. There is no distension.      Palpations: Abdomen is soft.      Tenderness: There is no abdominal tenderness.   Musculoskeletal:         General: No deformity. Normal range of motion.      Cervical back: Normal range of motion and neck supple.      Comments: Redness to bilat great toes.   Skin:     General: Skin is warm and dry.      Findings: No erythema or rash.      Comments: Right chest wall port. Dressing c/d/i. No sign of infection to site.   Neurological:      Mental Status: She is alert and oriented to person, place, and time.   Psychiatric:         Behavior: Behavior normal.         Thought Content: Thought content normal.         Judgment: Judgment normal.       Significant Labs:   CBC:   Recent Labs   Lab 05/01/22  1008 05/02/22  0308   WBC 2.84* 3.64*   HGB 7.7* 6.9*   HCT 24.1* 22.0*    187    and CMP:   Recent Labs   Lab 05/01/22  1008 05/02/22  0308   * 126*   K 3.8 3.3*   CL 92* 89*   CO2 30* 30*    87   BUN 14 12   CREATININE 0.7 0.6   CALCIUM 8.7 8.7   PROT 5.4* 5.7*   ALBUMIN 2.1* 2.0*   BILITOT 0.5 0.4   ALKPHOS 78 71   AST 35 38   ALT 37 40   ANIONGAP 10 7*   EGFRNONAA >60.0 >60.0       Diagnostic Results:  I have reviewed all pertinent imaging results/findings within the past 24 hours.    Assessment/Plan:     * Acute gout of foot  - patient reports difficulty bearing weight on bilat feet due to pain, improving  - x-ray showing soft tissue edema suggestive of gout flare  - uric acid wnl  - continue colchicine 0.6mg daily until flare resolves  - start allopurinol 100 mg daily for prevention    Physical debility  - reports difficulty with ambulation due to pain and weakness  - PT/OT consulted. Recommending SNF.    Acute respiratory failure with hypoxia  Patient with Hypoxic Respiratory failure which is Acute.  she is not on home oxygen. Supplemental oxygen was provided and noted-  .   Signs/symptoms of respiratory failure  include- tachypnea and increased work of breathing. Contributing diagnoses includes - ARDS Labs and images were reviewed. Patient Has not had a recent ABG. Will treat underlying causes and adjust management of respiratory failure as follows- performed 6 minute walk test at previous admission and patient qualified for home O2.    CAP (community acquired pneumonia)  - Pt with 2 weeks of productive cough, nasal congestion  - Upon arrival to the hospial she was hypoxic requiring 3L NC, unable to wean O2 today  - CXR with likely develop consolidation  - CTA shows New patchy consolidative opacities in the in the right upper and posterior right lower lobes.  Findings suggestive infectious etiology such as pneumonia, multifocal pneumonia.  Also consider atypical given patient's reported immunocompromised status.  Aspiration could present similarly.  Consider continued follow-up after acute clinical illness has resolved to ensure resolution. New mediastinal and bilateral hilar lymphadenopathy, likely reactive.  - completed azithromycin.   - completed 7 day course of Levaquin on 4/30.  - CXR on admission showing possible developing consolidation.   - currently on cefepime due to fever    MDS (myelodysplastic syndrome)  - Primary oncologist, Dr. 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 Developed allergy and was then desensitized. Soon after developed pancytopenia and Revlimid was stopped June 2017.    - BMBX on 6/8/17 was with normal cytogenetics. 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 Discussed treatment options of HMA therapy or repeat trial of Revlimid and patient wished to proceed with Revlimid   - Revlimid stopped again due to repeat allergic reaction and pancytopenia 3/2019  - Started cycle 1 Vidaza 5/6/19 subq for 5 days every 28 days  - Restaging bone marrow biopsy following cycle 5 from 10/24/19 shows persistent MDS, no excess blasts and 3 of 20 metaphases with 5q  deletion  - Restaging marrow on 04/21/21 with persistent MDS with isolated del 5Q. Of 20 metaphases, 5 were normal and 15 had a 5q deletion, NGS positive for SF3B1 and TP53 (12%). 1.4% blasts  - Received vidaza for 22 cycles, received Decitabine for C23 due to national shortage of vidaza and then back to vidaza with C24.  - completed cycle 39 on 4/8/22  - due for cycle 40 on 05/02/2022.  Will need to be postponed.    Pancytopenia due to antineoplastic chemotherapy  Transfuse for hgb < 7 or plts < 10K  Daily cbc while inpatient  Continue acyclovir and fluconazole ppx  Will resume cipro ppx when cefepime is stopped    Fever  Patient had Tmax 101.2 on 4/30  Started on cefepime  Blood cultures with NGTD  Chest x-ray on admission showing possible developing consolidation    Controlled type 2 diabetes mellitus without complication, without long-term current use of insulin  Will hold home po diabetic medications  SSI, ACHS blood glucose monitoring, and diabetic diet  Goal bg 140-180    Hypokalemia  - K+ 3.3 today  - replacing per prn order set  - daily CMP while inpatient    Adjustment disorder with mixed anxiety and depressed mood  - continue home celexa    Essential hypertension  Continue home lisinopril/hctz and coreg  SBP 170s-180s. Increased Coreg dose.    CAD (coronary artery disease)  - S/P left carotid endarterectomy prior to cancer dx, s/p PCI (3 stents) >20 years ago   - Continue on ASA 81mg daily, platelets are wnl  - Initiation of statin?        VTE Risk Mitigation (From admission, onward)         Ordered     enoxaparin injection 40 mg  Daily         04/29/22 1651     IP VTE HIGH RISK PATIENT  Once         04/29/22 1651     Place sequential compression device  Until discontinued         04/29/22 1651                Disposition: Inpatient.    Altagracia Cornejo, NP  Bone Marrow Transplant  [facility] ([facility])

## 2022-05-02 NOTE — PLAN OF CARE
C/o of pain in feet this shift. Pt declined pain meds. ACHS, no coverage required. Diabetic diet, appetite good. Pt uses bedpan and purwick. BM x 2. 2L NC, sats 94%. PRN electrolytes given. POC reviewed with patient; understanding verbalized. Pt. with nonskid footwear on, bed in lowest position, and locked with bed rails up x2.  Pt. has call light and personal items within reach. Patient turned throughout shift. VSS and afebrile this shift. All questions and concerns addressed at this time. Will continue to monitor.

## 2022-05-03 NOTE — ASSESSMENT & PLAN NOTE
- Pt with 2 weeks of productive cough, nasal congestion  - Upon arrival to the hospial she was hypoxic requiring 3L NC, weaned to 2L today  - CXR with likely develop consolidation  - CTA shows New patchy consolidative opacities in the in the right upper and posterior right lower lobes.  Findings suggestive infectious etiology such as pneumonia, multifocal pneumonia.  Also consider atypical given patient's reported immunocompromised status.  Aspiration could present similarly.  Consider continued follow-up after acute clinical illness has resolved to ensure resolution. New mediastinal and bilateral hilar lymphadenopathy, likely reactive.  - completed azithromycin.   - completed 7 day course of Levaquin on 4/30.  - CXR on admission showing possible developing consolidation.   - Cefepime 4/30-5/3 (d/c'd today as afebrile)

## 2022-05-03 NOTE — ASSESSMENT & PLAN NOTE
- Transfuse for hgb < 7 or plts < 10K  - Daily cbc while inpatient  - Continue acyclovir and fluconazole ppx  - Will resume cipro ppx when cefepime is stopped

## 2022-05-03 NOTE — ASSESSMENT & PLAN NOTE
- S/P left carotid endarterectomy prior to cancer dx, s/p PCI (3 stents) >20 years ago   - Continue on ASA 81mg daily, platelets are wnl  - On repatha as outpatient (statin intolerant)

## 2022-05-03 NOTE — ASSESSMENT & PLAN NOTE
- patient reports difficulty bearing weight on bilat feet due to pain, improving  - x-ray showing soft tissue edema suggestive of gout flare  - uric acid wnl  - continue colchicine 0.6mg daily until flare resolves  - start allopurinol 100 mg daily for prevention  - cooling muscle cream PRN for additional pain support

## 2022-05-03 NOTE — PLAN OF CARE
Pt received 1 unit of RBC, no adverse reaction observed. Afebrile. Medicated earlier with Tramadol for c/o pain to BLE pain.Both feet remain swollen and tender to touch. Pt voided adequate urin and had one loose BM. Turned and repositioned. Protective pad applied to heels and sacrum. Safety precaution in place.

## 2022-05-03 NOTE — ASSESSMENT & PLAN NOTE
Patient admitted with acute hypoxic respiratory failure, she was treated for PNA during recent admission and was initiated on home oxygen after 6 min walk test previous admission.  - CXR with pulmonary consolidation and fever on admission, recently completed levaquin/azithro course for CAP.   - Started on Cefepime on admission, further infectious work up unrevealing and afebrile >48 hours, will discontinued Cefepime today  - O2 saturations stable at 95% on 2L, continue supplemental O2

## 2022-05-03 NOTE — PROGRESS NOTES
called [facility] (SNF) unit at [phone] for an update on bed availability for the patient. The update is there are no available beds today. When ask, where is the patient on the waiting list? The response was [facility] will call back with that information.    No other needs noted at this time.

## 2022-05-03 NOTE — ASSESSMENT & PLAN NOTE
- Primary oncologist, Dr. 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 Developed allergy and was then desensitized. Soon after developed pancytopenia and Revlimid was stopped June 2017.    - BMBX on 6/8/17 was with normal cytogenetics. 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 Of 20 metaphases, 5 were normal and 15 had a 5q deletion, NGS positive for SF3B1 and TP53 (12%). 1.4% blasts  - Received vidaza for 22 cycles, received Decitabine for C23 due to national shortage of vidaza and then back to vidaza with C24.  - completed cycle 39 on 4/8/22  - due for cycle 40 on 05/02/2022, postponed whiled admitted and to be readdressed as outpatient

## 2022-05-03 NOTE — PROGRESS NOTES
[facility] - [facility] ([facility])  Hematology  Bone Marrow Transplant  Progress Note    Patient Name: Susan Puente  Admission Date: 4/29/2022  Hospital Length of Stay: 1 days  Code Status: DNR    Subjective:     Interval History: Pt afebrile overnight, will d/c Cefepime today. Infection work up remains negative apart from CXR possible consolidation on admit, for which she previously completed azithro/levaquin. Remains hypertensive with SBP in 170s, increased Coreg yesterday; will trend today and see if BP improvement after AM meds. CMP improved today as well. Subjectively she endorses feeling stronger, feet pain improving, breathing improving- but overall still very deconditioned. No other complaints/concerns.     Objective:     Vital Signs (Most Recent):  Temp: 97.5 °F (36.4 °C) (05/03/22 0705)  Pulse: 74 (05/03/22 0705)  Resp: 18 (05/03/22 0705)  BP: (!) 178/74 (05/03/22 0705)  SpO2: 95 % (05/03/22 0705)   Vital Signs (24h Range):  Temp:  [97.5 °F (36.4 °C)-98.6 °F (37 °C)] 97.5 °F (36.4 °C)  Pulse:  [68-78] 74  Resp:  [16-18] 18  SpO2:  [92 %-98 %] 95 %  BP: (137-178)/(60-74) 178/74     Weight: 71 kg (156 lb 8.4 oz)  Body mass index is 26.05 kg/m².  Body surface area is 1.8 meters squared.    ECOG SCORE           Intake/Output - Last 3 Shifts         05/01 0700  05/02 0659 05/02 0700  05/03 0659 05/03 0700  05/04 0659    P.O. 1100 510     Total Intake(mL/kg) 1100 (15.5) 510 (7.2)     Urine (mL/kg/hr) 1250 (0.7) 1750 (1)     Stool  0     Total Output 1250 1750     Net -150 -1240            Urine Occurrence  1 x     Stool Occurrence 2 x 3 x             Physical Exam  Constitutional:       Appearance: She is well-developed.   HENT:      Head: Normocephalic and atraumatic.      Mouth/Throat:      Pharynx: No oropharyngeal exudate.   Eyes:      Conjunctiva/sclera: Conjunctivae normal.      Pupils: Pupils are equal, round, and reactive to light.   Cardiovascular:      Rate and Rhythm: Normal rate and regular  rhythm.      Heart sounds: Normal heart sounds. No murmur heard.  Pulmonary:      Effort: Pulmonary effort is normal.      Comments: Diminished lung sounds  Abdominal:      General: Bowel sounds are normal. There is no distension.      Palpations: Abdomen is soft.      Tenderness: There is no abdominal tenderness.   Musculoskeletal:         General: No deformity. Normal range of motion.      Cervical back: Normal range of motion and neck supple.      Comments: Redness to bilat great toes.   Skin:     General: Skin is warm and dry.      Findings: No erythema or rash.      Comments: Right chest wall port. Dressing c/d/i. No sign of infection to site.   Neurological:      Mental Status: She is alert and oriented to person, place, and time.   Psychiatric:         Behavior: Behavior normal.         Thought Content: Thought content normal.         Judgment: Judgment normal.       Significant Labs:   CBC:   Recent Labs   Lab 05/02/22  0308 05/03/22  0559   WBC 3.64* 3.82*   HGB 6.9* 8.1*   HCT 22.0* 25.5*    171    and CMP:   Recent Labs   Lab 05/02/22  0308 05/02/22  1040 05/03/22  0559   * 128* 133*   K 3.3* 3.4* 3.7   CL 89* 88* 96   CO2 30* 32* 31*   GLU 87 108 103   BUN 12 12 14   CREATININE 0.6 0.6 0.6   CALCIUM 8.7 8.3* 8.4*   PROT 5.7* 5.4* 5.3*   ALBUMIN 2.0* 2.0* 2.0*   BILITOT 0.4 0.5 0.7   ALKPHOS 71 76 76   AST 38 38 38   ALT 40 42 45*   ANIONGAP 7* 8 6*   EGFRNONAA >60.0 >60.0 >60.0       Diagnostic Results:  None    Assessment/Plan:     * Acute gout of foot  - patient reports difficulty bearing weight on bilat feet due to pain, improving  - x-ray showing soft tissue edema suggestive of gout flare  - uric acid wnl  - continue colchicine 0.6mg daily until flare resolves  - start allopurinol 100 mg daily for prevention  - cooling muscle cream PRN for additional pain support     Acute respiratory failure with hypoxia  Patient admitted with acute hypoxic respiratory failure, she was treated for PNA  during recent admission and was initiated on home oxygen after 6 min walk test previous admission.  - CXR with pulmonary consolidation and fever on admission, recently completed levaquin/azithro course for CAP.   - Started on Cefepime on admission, further infectious work up unrevealing and afebrile >48 hours, will discontinued Cefepime today  - O2 saturations stable at 95% on 2L, continue supplemental O2    MDS (myelodysplastic syndrome)  - Primary oncologist, Dr. 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 Developed allergy and was then desensitized. Soon after developed pancytopenia and Revlimid was stopped June 2017.    - BMBX on 6/8/17 was with normal cytogenetics. 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 Of 20 metaphases, 5 were normal and 15 had a 5q deletion, NGS positive for SF3B1 and TP53 (12%). 1.4% blasts  - Received vidaza for 22 cycles, received Decitabine for C23 due to national shortage of vidaza and then back to vidaza with C24.  - completed cycle 39 on 4/8/22  - due for cycle 40 on 05/02/2022, postponed whiled admitted and to be readdressed as outpatient     Essential hypertension  - Continue home lisinopril/hctz and coreg  - SBP 170s-180s. Increased Coreg dose to 25mg daily with modest improvement in BP  - Will continue to monitor BP to see if further adjustments warranted     Fever  - Patient had Tmax 101.2 on 4/30  - Started on cefepime; discontinued 5/3  - Blood cultures with NGTD  -  Chest x-ray on admission showing possible developing consolidation    Physical debility  - reports difficulty with ambulation due to pain and weakness  - PT/OT consulted. Recommending SNF.  - Adding Boost with meals for nutritional support    Pancytopenia due to antineoplastic chemotherapy  - Transfuse for hgb < 7 or plts < 10K  - Daily cbc while inpatient  - Continue acyclovir and fluconazole ppx  - Will resume cipro ppx when cefepime is stopped    CAP (community acquired pneumonia)  - Pt with 2 weeks of productive cough, nasal congestion  - Upon arrival to the hospial she was hypoxic requiring 3L NC, weaned to 2L today  - CXR with likely develop consolidation  - CTA shows New patchy consolidative opacities in the in the right upper and posterior right lower lobes.  Findings suggestive infectious etiology such as pneumonia, multifocal pneumonia.  Also consider atypical given patient's reported immunocompromised status.  Aspiration could present similarly.  Consider continued follow-up after acute clinical illness has resolved to ensure resolution. New mediastinal and bilateral hilar lymphadenopathy, likely reactive.  - completed azithromycin.   - completed 7 day course of Levaquin on 4/30.  - CXR on admission showing possible developing consolidation.   - Cefepime 4/30-5/3 (d/c'd today as afebrile)    Controlled type 2 diabetes mellitus without complication, without long-term current use of insulin  - Will hold home po diabetic medications  - SSI, ACHS blood glucose monitoring, and diabetic diet  - Goal bg 140-180    Hypokalemia  - K+ 3.7 today  - replacing per prn order set  - daily CMP while inpatient    Adjustment disorder with mixed anxiety and depressed mood  - continue home celexa    CAD (coronary artery disease)  - S/P left carotid endarterectomy prior to cancer dx, s/p PCI (3 stents) >20 years ago   - Continue on ASA 81mg daily, platelets are wnl  - On repatha as outpatient (statin intolerant)        VTE  Risk Mitigation (From admission, onward)         Ordered     enoxaparin injection 40 mg  Daily         04/29/22 1651     IP VTE HIGH RISK PATIENT  Once         04/29/22 1651     Place sequential compression device  Until discontinued         04/29/22 1651                Disposition: Remain inpatient while awaiting SNF placement    Jolanta Guy PA-C  Bone Marrow Transplant  [facility] ([facility])

## 2022-05-03 NOTE — ASSESSMENT & PLAN NOTE
- Continue home lisinopril/hctz and coreg  - SBP 170s-180s. Increased Coreg dose to 25mg daily with modest improvement in BP  - Will continue to monitor BP to see if further adjustments warranted

## 2022-05-03 NOTE — PLAN OF CARE
C/o knee pain and foot pain. PRN Tramadol given x 1. POC reviewed with patient; understanding verbalized. BP elevated. Team notified, meds adjusted. ACHS,Diabetic diet, appetite good. Pt voids via purwick. BMX1 this shift. Pt. with nonskid footwear on, bed in lowest position, and locked with bed rails up x2. Pt. has call light and personal items within reach. Pt turned throughout shift. VSS and afebrile this shift. All questions and concerns addressed at this time. Will continue to monitor.

## 2022-05-03 NOTE — ASSESSMENT & PLAN NOTE
- Patient had Tmax 101.2 on 4/30  - Started on cefepime; discontinued 5/3  - Blood cultures with NGTD  - Chest x-ray on admission showing possible developing consolidation

## 2022-05-03 NOTE — PT/OT/SLP PROGRESS
Occupational Therapy   Treatment    Name: Susan Puente  MRN: [id number]  Admitting Diagnosis:  Acute gout of foot       Recommendations:     Discharge Recommendations: nursing facility, skilled  Discharge Equipment Recommendations:   (TBD)  Barriers to discharge:  Inaccessible home environment    Assessment:     Susan Puente is a 71 y.o. female with a medical diagnosis of Acute gout of foot.  She presents with deficits in functional mobility due to pain in BLE 2/2 gout. Pt. Required Min A for bed mobility and S to sit EOB.  Pt. Required Min A for weight shifting to place orthopedic bed pan under patient. Pt. Unable to attempt standing on this date to level of pain. Pt. Was able to engage in BUE there ex seated EOB. Performance deficits affecting function are weakness, impaired endurance, impaired self care skills, impaired functional mobilty, pain, gait instability.     Rehab Prognosis:  Good; patient would benefit from acute skilled OT services to address these deficits and reach maximum level of function.       Plan:     Patient to be seen 3 x/week to address the above listed problems via self-care/home management, therapeutic activities, therapeutic exercises, neuromuscular re-education  · Plan of Care Expires: 05/30/22  · Plan of Care Reviewed with: patient    Subjective     Pain/Comfort:  · Pain Rating 1: 7/10  · Location - Side 1: Bilateral  · Location 1: foot  · Pain Addressed 1: Distraction, Reposition  · Pain Rating Post-Intervention 1: 7/10    Objective:     Communicated with: nurse prior to session.  Patient found supine with oxygen, bed alarm, PureWick upon OT entry to room.    General Precautions: Standard, fall   Orthopedic Precautions:N/A   Braces: N/A  Respiratory Status: Nasal cannula, flow 2 L/min     Occupational Performance:     Bed Mobility:    · Patient completed Supine to Sit with minimum assistance to guide BLE off bed  · Mod A to scoot to EOB    Functional  Mobility/Transfers:  · Not tested    Activities of Daily Living:  · Toileting: moderate assistance with use of bed pan seated EOB      [facility] 6 Click ADL:      Treatment & Education:  Pt. Performed 1 set 20 reps of BUE there ex seated EOB for BUE strengthening   Pt. Required Min/Mod A to weight shift EOB to place small bed pan under patient for use    Patient left sitting edge of bed with all lines intact, call button in reach and nurse notifiedEducation:      GOALS:   Multidisciplinary Problems     Occupational Therapy Goals        Problem: Occupational Therapy    Goal Priority Disciplines Outcome Interventions   Occupational Therapy Goal     OT, PT/OT Ongoing, Progressing    Description: Goals to be met by: 5/14/22     Patient will increase functional independence with ADLs by performing:    LE Dressing with Set-up Assistance.  Grooming while standing at sink with Stand-by Assistance.  Toileting from toilet with Minimal Assistance for hygiene and clothing management.   Toilet transfer to toilet with Stand-by Assistance.  Functional mobility of household and community distance with  minimum assistance and AD as needed                       Time Tracking:     OT Date of Treatment: 05/03/22  OT Start Time: 1015  OT Stop Time: 1043  OT Total Time (min): 28 min    Billable Minutes:Self Care/Home Management 28    OT/ADRYAN: OT          5/3/2022

## 2022-05-03 NOTE — NURSING
Pt has I unit of blood ordered earlier in the day. This RN called blood bank and they said blood is not ready yet.

## 2022-05-03 NOTE — SUBJECTIVE & OBJECTIVE
Subjective:     Interval History: Pt afebrile overnight, will d/c Cefepime today. Infection work up remains negative apart from CXR possible consolidation on admit, for which she previously completed azithro/levaquin. Remains hypertensive with SBP in 170s, increased Coreg yesterday; will trend today and see if BP improvement after AM meds. CMP improved today as well. Subjectively she endorses feeling stronger, feet pain improving, breathing improving- but overall still very deconditioned. No other complaints/concerns.     Objective:     Vital Signs (Most Recent):  Temp: 97.5 °F (36.4 °C) (05/03/22 0705)  Pulse: 74 (05/03/22 0705)  Resp: 18 (05/03/22 0705)  BP: (!) 178/74 (05/03/22 0705)  SpO2: 95 % (05/03/22 0705)   Vital Signs (24h Range):  Temp:  [97.5 °F (36.4 °C)-98.6 °F (37 °C)] 97.5 °F (36.4 °C)  Pulse:  [68-78] 74  Resp:  [16-18] 18  SpO2:  [92 %-98 %] 95 %  BP: (137-178)/(60-74) 178/74     Weight: 71 kg (156 lb 8.4 oz)  Body mass index is 26.05 kg/m².  Body surface area is 1.8 meters squared.    ECOG SCORE           Intake/Output - Last 3 Shifts         05/01 0700  05/02 0659 05/02 0700  05/03 0659 05/03 0700  05/04 0659    P.O. 1100 510     Total Intake(mL/kg) 1100 (15.5) 510 (7.2)     Urine (mL/kg/hr) 1250 (0.7) 1750 (1)     Stool  0     Total Output 1250 1750     Net -150 -1240            Urine Occurrence  1 x     Stool Occurrence 2 x 3 x             Physical Exam  Constitutional:       Appearance: She is well-developed.   HENT:      Head: Normocephalic and atraumatic.      Mouth/Throat:      Pharynx: No oropharyngeal exudate.   Eyes:      Conjunctiva/sclera: Conjunctivae normal.      Pupils: Pupils are equal, round, and reactive to light.   Cardiovascular:      Rate and Rhythm: Normal rate and regular rhythm.      Heart sounds: Normal heart sounds. No murmur heard.  Pulmonary:      Effort: Pulmonary effort is normal.      Comments: Diminished lung sounds  Abdominal:      General: Bowel sounds are  normal. There is no distension.      Palpations: Abdomen is soft.      Tenderness: There is no abdominal tenderness.   Musculoskeletal:         General: No deformity. Normal range of motion.      Cervical back: Normal range of motion and neck supple.      Comments: Redness to bilat great toes.   Skin:     General: Skin is warm and dry.      Findings: No erythema or rash.      Comments: Right chest wall port. Dressing c/d/i. No sign of infection to site.   Neurological:      Mental Status: She is alert and oriented to person, place, and time.   Psychiatric:         Behavior: Behavior normal.         Thought Content: Thought content normal.         Judgment: Judgment normal.       Significant Labs:   CBC:   Recent Labs   Lab 05/02/22  0308 05/03/22  0559   WBC 3.64* 3.82*   HGB 6.9* 8.1*   HCT 22.0* 25.5*    171    and CMP:   Recent Labs   Lab 05/02/22  0308 05/02/22  1040 05/03/22  0559   * 128* 133*   K 3.3* 3.4* 3.7   CL 89* 88* 96   CO2 30* 32* 31*   GLU 87 108 103   BUN 12 12 14   CREATININE 0.6 0.6 0.6   CALCIUM 8.7 8.3* 8.4*   PROT 5.7* 5.4* 5.3*   ALBUMIN 2.0* 2.0* 2.0*   BILITOT 0.4 0.5 0.7   ALKPHOS 71 76 76   AST 38 38 38   ALT 40 42 45*   ANIONGAP 7* 8 6*   EGFRNONAA >60.0 >60.0 >60.0       Diagnostic Results:  None

## 2022-05-03 NOTE — ASSESSMENT & PLAN NOTE
- Will hold home po diabetic medications  - SSI, ACHS blood glucose monitoring, and diabetic diet  - Goal bg 140-180

## 2022-05-04 NOTE — SUBJECTIVE & OBJECTIVE
Subjective:     Interval History: Remains afebrile. Cipro ppx added. CMP with several electrolyte abnormalities compared to yesterday, repeating BMP. Feeling improved and stronger today, less foot pain. Discussed resistant HTN with cardiology who recommended switching HCTZ to Chlorthiadone daily and adding nifedipine XL, BP improved this AM/slightly soft, will decrease Nifedipine from 60mg to 30mg daily. Still awaiting SNF placement and expanding search per social work, no beds available with [facility].     Objective:     Vital Signs (Most Recent):  Temp: 97.9 °F (36.6 °C) (05/04/22 0801)  Pulse: 74 (05/04/22 0801)  Resp: 17 (05/04/22 0801)  BP: (!) 116/56 (05/04/22 0801)  SpO2: (!) 92 % (05/04/22 0801)   Vital Signs (24h Range):  Temp:  [97.8 °F (36.6 °C)-99.1 °F (37.3 °C)] 97.9 °F (36.6 °C)  Pulse:  [61-78] 74  Resp:  [17-20] 17  SpO2:  [92 %-97 %] 92 %  BP: (116-187)/(56-79) 116/56     Weight: 71 kg (156 lb 8.4 oz)  Body mass index is 26.05 kg/m².  Body surface area is 1.8 meters squared.    ECOG SCORE             Intake/Output - Last 3 Shifts         05/02 0700  05/03 0659 05/03 0700  05/04 0659 05/04 0700  05/05 0659    P.O. 510 880     IV Piggyback  292.5     Total Intake(mL/kg) 510 (7.2) 1172.5 (16.5)     Urine (mL/kg/hr) 1750 (1) 2050 (1.2)     Stool 0      Total Output 1750 2050     Net -1240 -877.5            Urine Occurrence 1 x 1 x     Stool Occurrence 3 x              Physical Exam  Constitutional:       Appearance: She is well-developed.   HENT:      Head: Normocephalic and atraumatic.      Mouth/Throat:      Pharynx: No oropharyngeal exudate.   Eyes:      Conjunctiva/sclera: Conjunctivae normal.      Pupils: Pupils are equal, round, and reactive to light.   Cardiovascular:      Rate and Rhythm: Normal rate and regular rhythm.      Heart sounds: Normal heart sounds. No murmur heard.  Pulmonary:      Effort: Pulmonary effort is normal.      Comments: Diminished lung sounds  Abdominal:      General:  Bowel sounds are normal. There is no distension.      Palpations: Abdomen is soft.      Tenderness: There is no abdominal tenderness.   Musculoskeletal:         General: No deformity. Normal range of motion.      Cervical back: Normal range of motion and neck supple.      Comments: Redness to bilat great toes.   Skin:     General: Skin is warm and dry.      Findings: No erythema or rash.      Comments: Right chest wall port. Dressing c/d/i. No sign of infection to site.   Neurological:      Mental Status: She is alert and oriented to person, place, and time.   Psychiatric:         Behavior: Behavior normal.         Thought Content: Thought content normal.         Judgment: Judgment normal.       Significant Labs:   CBC:   Recent Labs   Lab 05/03/22  0559 05/04/22  0501   WBC 3.82* 6.04   HGB 8.1* 8.1*   HCT 25.5* 25.0*    188    and CMP:   Recent Labs   Lab 05/02/22  1040 05/03/22  0559 05/04/22  0501   * 133* 128*   K 3.4* 3.7 3.9   CL 88* 96 89*   CO2 32* 31* 32*    103 103   BUN 12 14 16   CREATININE 0.6 0.6 0.7   CALCIUM 8.3* 8.4* 8.3*   PROT 5.4* 5.3* 5.3*   ALBUMIN 2.0* 2.0* 2.0*   BILITOT 0.5 0.7 0.6   ALKPHOS 76 76 78   AST 38 38 32   ALT 42 45* 42   ANIONGAP 8 6* 7*   EGFRNONAA >60.0 >60.0 >60.0       Diagnostic Results:  None

## 2022-05-04 NOTE — PROGRESS NOTES
[facility] - Oncology ([facility])  Hematology  Bone Marrow Transplant  Progress Note    Patient Name: Susan Puente  Admission Date: 4/29/2022  Hospital Length of Stay: 2 days  Code Status: DNR    Subjective:     Interval History: Remains afebrile. Cipro ppx added. CMP with several electrolyte abnormalities compared to yesterday, repeating BMP. Feeling improved and stronger today, less foot pain. Discussed resistant HTN with cardiology who recommended switching HCTZ to Chlorthiadone daily and adding nifedipine XL, BP improved this AM/slightly soft, will decrease Nifedipine from 60mg to 30mg daily. Still awaiting SNF placement and expanding search per social work, no beds available with [facility].     Objective:     Vital Signs (Most Recent):  Temp: 97.9 °F (36.6 °C) (05/04/22 0801)  Pulse: 74 (05/04/22 0801)  Resp: 17 (05/04/22 0801)  BP: (!) 116/56 (05/04/22 0801)  SpO2: (!) 92 % (05/04/22 0801)   Vital Signs (24h Range):  Temp:  [97.8 °F (36.6 °C)-99.1 °F (37.3 °C)] 97.9 °F (36.6 °C)  Pulse:  [61-78] 74  Resp:  [17-20] 17  SpO2:  [92 %-97 %] 92 %  BP: (116-187)/(56-79) 116/56     Weight: 71 kg (156 lb 8.4 oz)  Body mass index is 26.05 kg/m².  Body surface area is 1.8 meters squared.    ECOG SCORE             Intake/Output - Last 3 Shifts         05/02 0700  05/03 0659 05/03 0700  05/04 0659 05/04 0700  05/05 0659    P.O. 510 880     IV Piggyback  292.5     Total Intake(mL/kg) 510 (7.2) 1172.5 (16.5)     Urine (mL/kg/hr) 1750 (1) 2050 (1.2)     Stool 0      Total Output 1750 2050     Net -1240 -877.5            Urine Occurrence 1 x 1 x     Stool Occurrence 3 x              Physical Exam  Constitutional:       Appearance: She is well-developed.   HENT:      Head: Normocephalic and atraumatic.      Mouth/Throat:      Pharynx: No oropharyngeal exudate.   Eyes:      Conjunctiva/sclera: Conjunctivae normal.      Pupils: Pupils are equal, round, and reactive to light.   Cardiovascular:      Rate and Rhythm: Normal  rate and regular rhythm.      Heart sounds: Normal heart sounds. No murmur heard.  Pulmonary:      Effort: Pulmonary effort is normal.      Comments: Diminished lung sounds  Abdominal:      General: Bowel sounds are normal. There is no distension.      Palpations: Abdomen is soft.      Tenderness: There is no abdominal tenderness.   Musculoskeletal:         General: No deformity. Normal range of motion.      Cervical back: Normal range of motion and neck supple.      Comments: Redness to bilat great toes.   Skin:     General: Skin is warm and dry.      Findings: No erythema or rash.      Comments: Right chest wall port. Dressing c/d/i. No sign of infection to site.   Neurological:      Mental Status: She is alert and oriented to person, place, and time.   Psychiatric:         Behavior: Behavior normal.         Thought Content: Thought content normal.         Judgment: Judgment normal.       Significant Labs:   CBC:   Recent Labs   Lab 05/03/22  0559 05/04/22  0501   WBC 3.82* 6.04   HGB 8.1* 8.1*   HCT 25.5* 25.0*    188    and CMP:   Recent Labs   Lab 05/02/22  1040 05/03/22  0559 05/04/22  0501   * 133* 128*   K 3.4* 3.7 3.9   CL 88* 96 89*   CO2 32* 31* 32*    103 103   BUN 12 14 16   CREATININE 0.6 0.6 0.7   CALCIUM 8.3* 8.4* 8.3*   PROT 5.4* 5.3* 5.3*   ALBUMIN 2.0* 2.0* 2.0*   BILITOT 0.5 0.7 0.6   ALKPHOS 76 76 78   AST 38 38 32   ALT 42 45* 42   ANIONGAP 8 6* 7*   EGFRNONAA >60.0 >60.0 >60.0       Diagnostic Results:  None    Assessment/Plan:     * Acute gout of foot  - patient reports difficulty bearing weight on bilat feet due to pain, improving  - x-ray showing soft tissue edema suggestive of gout flare  - uric acid wnl  - continue colchicine 0.6mg daily until flare resolves  - start allopurinol 100 mg daily for prevention  - cooling muscle cream PRN for additional pain support     Acute respiratory failure with hypoxia  Patient admitted with acute hypoxic respiratory failure, she was  treated for PNA during recent admission and was initiated on home oxygen after 6 min walk test previous admission.  - CXR with pulmonary consolidation and fever on admission, recently completed levaquin/azithro course for CAP.   - Started on Cefepime on admission, further infectious work up unrevealing and afebrile >48 hours, will discontinued Cefepime 5/3  - O2 saturations stable at 95% on 2L, continue supplemental O2    MDS (myelodysplastic syndrome)  - Primary oncologist, Dr. 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 Developed allergy and was then desensitized. Soon after developed pancytopenia and Revlimid was stopped June 2017.    - BMBX on 6/8/17 was with normal cytogenetics. 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 Of 20 metaphases, 5 were normal and 15 had a 5q deletion, NGS positive for SF3B1 and TP53 (12%). 1.4% blasts  - Received vidaza for 22 cycles, received Decitabine for C23 due to national shortage of vidaza and then back to vidaza with C24.  - completed cycle 39 on 4/8/22  - due for cycle 40 on 05/02/2022, postponed whiled admitted and to be readdressed as outpatient     Essential hypertension  - At home regimen: lisinopril/hctz and coreg  - SBP 170s-180s. Increased Coreg dose to 25mg with minimal improveemt  - Discussed with cardiology who recommended switching HCTZ to Chlorthalidone 25mg daily and adding Nifedipine XL 60mg with outpatient follow up. This AM BP slightly soft (115s/50s),  will adjust to Nifedipine XL 30mg to allow for some permissible HTN in this anemic/deconditioned pt  - Current anti-HTN regimen: Lisinopril 40mg daily, Coreg 25mg BID, Chlorthalidone 25mg daily, Nifedipine 30mg XL daily   - Will need cardiology f/u outpatient    Fever  - Patient had Tmax 101.2 on 4/30  - Started on cefepime; discontinued 5/3  - Blood cultures with NGTD  - Chest x-ray on admission showing possible developing consolidation    Physical debility  - reports difficulty with ambulation due to pain and weakness  - PT/OT consulted. Recommending SNF.  - Adding Boost (glucose control) with meals for nutritional support    Pancytopenia due to antineoplastic chemotherapy  - Transfuse for hgb < 7 or plts < 10K  - Daily cbc while inpatient  - Continue acyclovir and fluconazole ppx  - Cipro ppx resumed 5/4    CAP (community acquired pneumonia)  - Pt with 2 weeks of productive cough, nasal congestion  - Upon arrival to the hospial she was hypoxic requiring 3L NC, weaned to 2L today  - CXR with likely develop consolidation  - CTA shows New patchy consolidative opacities in the in the right upper and posterior right lower lobes.  Findings suggestive infectious etiology such as pneumonia, multifocal pneumonia.  Also consider atypical given patient's reported immunocompromised status.  Aspiration could present similarly.  Consider continued follow-up after acute clinical illness has resolved to ensure resolution. New mediastinal and bilateral hilar lymphadenopathy, likely reactive.  - completed azithromycin and 7 day course of Levaquin on 4/30.  - CXR on admission showing possible developing consolidation: Cefepime 4/30-5/3     Controlled type 2 diabetes mellitus without complication, without long-term current use of insulin  - Will hold home po diabetic medications  - SSI, ACHS blood glucose monitoring, and diabetic diet  - Goal bg 140-180    Hypokalemia  - K+ 3.4 today  - replacing per prn order set  - daily CMP while  inpatient    Adjustment disorder with mixed anxiety and depressed mood  - continue home celexa    CAD (coronary artery disease)  - S/P left carotid endarterectomy prior to cancer dx, s/p PCI (3 stents) >20 years ago   - Continue on ASA 81mg daily, platelets are wnl  - On repatha as outpatient (statin intolerant)        VTE Risk Mitigation (From admission, onward)         Ordered     enoxaparin injection 40 mg  Daily         04/29/22 1651     IP VTE HIGH RISK PATIENT  Once         04/29/22 1651     Place sequential compression device  Until discontinued         04/29/22 1651                Disposition: Remain inpatient while awaiting SNF    Jolanta Guy PA-C  Bone Marrow Transplant  [facility] - Oncology ([facility])

## 2022-05-04 NOTE — ASSESSMENT & PLAN NOTE
- Pt with 2 weeks of productive cough, nasal congestion  - Upon arrival to the hospial she was hypoxic requiring 3L NC, weaned to 2L today  - CXR with likely develop consolidation  - CTA shows New patchy consolidative opacities in the in the right upper and posterior right lower lobes.  Findings suggestive infectious etiology such as pneumonia, multifocal pneumonia.  Also consider atypical given patient's reported immunocompromised status.  Aspiration could present similarly.  Consider continued follow-up after acute clinical illness has resolved to ensure resolution. New mediastinal and bilateral hilar lymphadenopathy, likely reactive.  - completed azithromycin and 7 day course of Levaquin on 4/30.  - CXR on admission showing possible developing consolidation: Cefepime 4/30-5/3

## 2022-05-04 NOTE — ASSESSMENT & PLAN NOTE
- reports difficulty with ambulation due to pain and weakness  - PT/OT consulted. Recommending SNF.  - Adding Boost (glucose control) with meals for nutritional support

## 2022-05-04 NOTE — ASSESSMENT & PLAN NOTE
Patient admitted with acute hypoxic respiratory failure, she was treated for PNA during recent admission and was initiated on home oxygen after 6 min walk test previous admission.  - CXR with pulmonary consolidation and fever on admission, recently completed levaquin/azithro course for CAP.   - Started on Cefepime on admission, further infectious work up unrevealing and afebrile >48 hours, will discontinued Cefepime 5/3  - O2 saturations stable at 95% on 2L, continue supplemental O2

## 2022-05-04 NOTE — ASSESSMENT & PLAN NOTE
- Transfuse for hgb < 7 or plts < 10K  - Daily cbc while inpatient  - Continue acyclovir and fluconazole ppx  - Cipro ppx resumed 5/4

## 2022-05-04 NOTE — ASSESSMENT & PLAN NOTE
- At home regimen: lisinopril/hctz and coreg  - SBP 170s-180s. Increased Coreg dose to 25mg with minimal improveemt  - Discussed with cardiology who recommended switching HCTZ to Chlorthalidone 25mg daily and adding Nifedipine XL 60mg with outpatient follow up. This AM BP slightly soft (115s/50s), will adjust to Nifedipine XL 30mg to allow for some permissible HTN in this anemic/deconditioned pt  - Current anti-HTN regimen: Lisinopril 40mg daily, Coreg 25mg BID, Chlorthalidone 25mg daily, Nifedipine 30mg XL daily   - Will need cardiology f/u outpatient

## 2022-05-04 NOTE — PT/OT/SLP PROGRESS
"Physical Therapy Treatment    Patient Name:  Susan Puente   MRN:  [id number]    Recommendations:     Discharge Recommendations:  nursing facility, skilled   Discharge Equipment Recommendations: other (see comments) (tbd)   Barriers to discharge: None    Assessment:     Susan Puente is a 71 y.o. female admitted with a medical diagnosis of Acute gout of foot.  She presents with the following impairments/functional limitations:  weakness, impaired endurance, pain, impaired self care skills, impaired functional mobilty, gait instability. Susan Puente would benefit from acute PT intervention to address listed functional deficits, provide patient/caregiver education, reduce fall risk, and maximize (I) and safety with functional mobility.    Pt tolerated session well, but continues to be limited by BLE pain 2/2 gout. Pt states that pain is slowly getting less intense but is still "excrutiating" at times. Pt unable to perform full AROM in knees without significant pain and remains unable to bear weight through LEs. Pt will continue to benefit from acute PT services in order to maximize safety and (I) with functional mobility.    After hospital discharge, pt would benefit from SNF to continue addressing therapy impairments.    Rehab Prognosis: Good; patient would benefit from acute skilled PT services to address these deficits and reach maximum level of function.      Plan:     During this hospitalization, patient to be seen 4 x/week to address the identified rehab impairments via gait training, therapeutic activities, therapeutic exercises, neuromuscular re-education and progress toward the following goals:    · Plan of Care Expires:  05/30/22    This plan of care has been discussed with the patient, who was included in its development and is in agreement with the identified goals and treatment plan.     Subjective     Communicated with RN prior to session.  Patient agreeable to participate.     Chief " Complaint: pain  Patient/Family Comments/goals: to maintain independence  Pt pain prior to session: did not rate; indicated in bilateral feet and knees  Pt pain following session: did not rate; indicated in bilateral feet and knees    Objective:     Patient found HOB elevated with oxygen, bed alarm, PureWick, telemetry  upon PT entry to room.    General Precautions: Standard, fall   Orthopedic Precautions:N/A   Braces: N/A     Functional Mobility:    Bed Mobility:  · Supine to Sit: Moderate Assistance  · Sit to Supine: Moderate Assistance  · Scooting/Bridging in supine to HOB: Minimal Assistance   · Pt using BUEs to pull to HOB    Transfers:   · Deferred 2/2 significant pain             Balance:  · Static Sit: Supervision at EOB x ~30 minutes  · PT assisting Pt with AAROM activities in BLEs while sitting EOB  · PT providing grade 1-2 medial patellar glides to R knee to help decrease pain and improve pain free ROM.       Therapeutic Activities/Exercises     Patient performed the following exercises with therapist assist and red theraband:  Resisted PF: 1x10 reps B  Resisted DF: 1x10 reps B  Resisted inversion: 1x10 reps B  Resisted eversion: 1x10 reps B  Resisted B UE horizonal abduction: 1x10 reps B  Resisted elbow extension: 1x10 reps B  *Also performed 2 x 10 SAQs in long sitting position in bed.   PT encouraged Pt to perform 2x/day    Patient educated on the importance of early mobility to prevent functional decline during hospital stay    Patient was instructed to utilize staff assistance for mobility/transfers.  Patient was educated on PT POC and all questions answered within PT scope of practice.    Patient able to verbalize understanding; will follow-up with pt during current admit for additional questions/concerns within scope of practice.     White board updated.     AM-PAC 6 CLICK MOBILITY  Total Score:10     Patient left HOB elevated with all lines intact and call button in reach.        History/Goals:      PAST MEDICAL HISTORY:  Past Medical History:   Diagnosis Date    Allergic rhinitis     Allergy     Anemia     Anxiety     CAD (coronary artery disease)     Carotid stenosis     Colon polyps 2016    Controlled type 2 diabetes mellitus without complication, without long-term current use of insulin 10/19/2016    Depression     GERD (gastroesophageal reflux disease)     Hearing loss in right ear     Hx of psychiatric care     Celexa, Prozac    MDS (myelodysplastic syndrome) with 5q deletion     Psychiatric problem     Therapy        Past Surgical History:   Procedure Laterality Date    BONE MARROW BIOPSY Left 6/7/2018    Procedure: Biopsy-bone marrow;  Surgeon: Gita Valdes MD;  Location: [facility] OR [address] [place] [address];  Service: Oncology;  Laterality: Left;    BONE MARROW BIOPSY Left 3/21/2019    Procedure: Biopsy-bone marrow;  Surgeon: Gita Valdes MD;  Location: [facility] OR [address] [place] [address];  Service: Oncology;  Laterality: Left;    BONE MARROW BIOPSY Left 10/24/2019    Procedure: Biopsy-bone marrow;  Surgeon: Gita Valdes MD;  Location: [facility] OR [address] [place] [address];  Service: Oncology;  Laterality: Left;  left iliac crest    BONE MARROW BIOPSY Left 4/21/2021    Procedure: Biopsy-bone marrow;  Surgeon: Gita Valdes MD;  Location: [facility] (4TH FLR);  Service: Oncology;  Laterality: Left;    BUNIONECTOMY      CAROTID ENDARTERECTOMY Left 2011    CAROTID STENT      COLONOSCOPY N/A 12/20/2016    Procedure: COLONOSCOPY;  Surgeon: PATRICIA Simpson MD;  Location: [facility] (4TH FLR);  Service: Endoscopy;  Laterality: N/A;  pt has vomiting with anesthesia in past    ENDOMETRIAL ABLATION      for endometriosis    INSERTION OF TUNNELED CENTRAL VENOUS CATHETER (CVC) WITH SUBCUTANEOUS PORT N/A 2/19/2020    Procedure: [id number];  Surgeon: Dosc Diagnostic Provider;  Location: [facility] OR [address] [place] [address];  Service: Radiology;  Laterality: N/A;  189    TONSILLECTOMY      TYMPANOSTOMY TUBE PLACEMENT         GOALS:   Multidisciplinary Problems      Physical Therapy Goals        Problem: Physical Therapy    Goal Priority Disciplines Outcome Goal Variances Interventions   Physical Therapy Goal     PT, PT/OT Ongoing, Progressing     Description: Goals to be met by: 22    Patient will increase functional independence with mobility by performin. Supine to sit with modified independence  2. Sit to supine with modified independence  3. Sit to stand transfer with supervision  4. Bed to chair transfer with contact guard assistance using LRAD as needed  5. Gait  x 10 feet with minimum assistance using LRAD as needed  6. Ascend/descend 1 flight of stair with appropriate handrails minimum assistance using LRAD as needed  7. Lower extremity exercise program x10 reps per handout, with IND                     Time Tracking:     PT Received On: 22  PT Start Time: 1335     PT Stop Time: 1431  PT Total Time (min): 56 min     Billable Minutes: Therapeutic Activity 25 and Therapeutic Exercise 25      Isacc Mccollum, DEREK  2022

## 2022-05-04 NOTE — PLAN OF CARE
Patient resting in bed: side rails up x 2, lowest position, bed ,locked and call bell in place. Plan of care was reviewed with patient. She is tolerating some PO intake. Due to pain/discomfort, patient remains bed bound with purwick in place. She will turn to when prompted but prefers to lay supine and shift her weight. Afebrile and vitals remain stable. Labs obtained, awaiting results. Frequent rounding completed and patient encouraged to call as needed. Currently awaiting SNF placement, will continue to monitor patient.     Problem: Adult Inpatient Plan of Care  Goal: Plan of Care Review  Outcome: Ongoing, Progressing  Goal: Patient-Specific Goal (Individualized)  Outcome: Ongoing, Progressing  Goal: Absence of Hospital-Acquired Illness or Injury  Outcome: Ongoing, Progressing  Goal: Optimal Comfort and Wellbeing  Outcome: Ongoing, Progressing  Goal: Readiness for Transition of Care  Outcome: Ongoing, Progressing     Problem: Diabetes Comorbidity  Goal: Blood Glucose Level Within Targeted Range  Outcome: Ongoing, Progressing     Problem: Adjustment to Illness (Sepsis/Septic Shock)  Goal: Optimal Coping  Outcome: Ongoing, Progressing     Problem: Glycemic Control Impaired (Sepsis/Septic Shock)  Goal: Blood Glucose Level Within Desired Range  Outcome: Ongoing, Progressing     Problem: Infection Progression (Sepsis/Septic Shock)  Goal: Absence of Infection Signs and Symptoms  Outcome: Ongoing, Progressing     Problem: Nutrition Impaired (Sepsis/Septic Shock)  Goal: Optimal Nutrition Intake  Outcome: Ongoing, Progressing     Problem: Fluid and Electrolyte Imbalance (Acute Kidney Injury/Impairment)  Goal: Fluid and Electrolyte Balance  Outcome: Ongoing, Progressing     Problem: Oral Intake Inadequate (Acute Kidney Injury/Impairment)  Goal: Optimal Nutrition Intake  Outcome: Ongoing, Progressing     Problem: Renal Function Impairment (Acute Kidney Injury/Impairment)  Goal: Effective Renal Function  Outcome: Ongoing,  Progressing     Problem: Fluid Imbalance (Pneumonia)  Goal: Fluid Balance  Outcome: Ongoing, Progressing     Problem: Infection (Pneumonia)  Goal: Resolution of Infection Signs and Symptoms  Outcome: Ongoing, Progressing     Problem: Respiratory Compromise (Pneumonia)  Goal: Effective Oxygenation and Ventilation  Outcome: Ongoing, Progressing     Problem: Infection  Goal: Absence of Infection Signs and Symptoms  Outcome: Ongoing, Progressing     Problem: Skin Injury Risk Increased  Goal: Skin Health and Integrity  Outcome: Ongoing, Progressing     Problem: Impaired Wound Healing  Goal: Optimal Wound Healing  Outcome: Ongoing, Progressing

## 2022-05-04 NOTE — PLAN OF CARE
Patient AAOX4, encouraged repositioning, but patient refusing turning. Magnesium replaced. IVF initiated. Accuchecks continued prior to meals. Complaints of pain to feet, managed with PRN Tramadol. PT worked with patient. Boost encouraged at mealtime. Patient stable at this time.

## 2022-05-05 PROBLEM — R63.0 POOR APPETITE: Status: ACTIVE | Noted: 2022-01-01

## 2022-05-05 PROBLEM — E87.1 HYPONATREMIA: Status: ACTIVE | Noted: 2022-01-01

## 2022-05-05 NOTE — NURSING
Physician rounding this morning and /51 and verbal recommendation to hold lisinopril and chlorthalidone and will decrease coreg.  Patient educated to cough and deep breath and educated on using the incentive spirometer and medication will be ordered for appetite and sleep [mirtazipine] at [facility].  ERIK CAMPBELL.

## 2022-05-05 NOTE — ASSESSMENT & PLAN NOTE
- reports difficulty with ambulation due to pain and weakness  - PT/OT consulted. Recommending SNF.  - Adding Boost (glucose control) with meals for nutritional support   [Normal Growth] : growth [Normal Development] : development [None] : No known medical problems [No Elimination Concerns] : elimination [No Feeding Concerns] : feeding [No Skin Concerns] : skin [Normal Sleep Pattern] : sleep [School] : school [Development and Mental Health] : development and mental health [Nutrition and Physical Activity] : nutrition and physical activity [Oral Health] : oral health [Safety] : safety [No Medications] : ~He/She~ is not on any medications [Patient] : patient [] : The components of the vaccine(s) to be administered today are listed in the plan of care. The disease(s) for which the vaccine(s) are intended to prevent and the risks have been discussed with the caretaker.  The risks are also included in the appropriate vaccination information statements which have been provided to the patient's caregiver.  The caregiver has given consent to vaccinate. [FreeTextEntry1] : Routine visit for this child. Doing well. Diet discussed. Exercise discussed. Safety issues discussed. Development for age discussed. Immunizations are up to date. ROutine blood work ordered. All questions answered.\par Hx of Vitiligo. Followed by Dermatology. S/P Phototherapy\par 8 year female here for well-visit, appropriate growth and development observed. Continue balanced diet with all food groups. Brush teeth twice a day with toothbrush. Recommend visit to dentist. Help child to maintain consistent daily routines and sleep schedule. School discussed. Ensure home is safe. Teach child about personal safety. Use consistent, positive discipline. Limit screen time to less than 2 hours per day. Encourage physical activity. Child needs to ride in a belt-positioning booster seat until  4 feet 9 inches has been reached and are between 8 and 12 years of age. \par \par Return 1 year for routine well child check.\par THe patient should participate in 60 minutes or more of physical activity daily.Encourage structured physical activity when possible.Educational material was provided.\par Referred to Dermatology for evaluation of large mole and f/u of vitiligo\par Flu vaccine discussed and given.,

## 2022-05-05 NOTE — PROGRESS NOTES
[facility] - Oncology ([facility])  Hematology  Bone Marrow Transplant  Progress Note    Patient Name: Susan Puente  Admission Date: 4/29/2022  Hospital Length of Stay: 3 days  Code Status: Full Code    Subjective:     Interval History: Pt afebrile, soft BP overnight/this AM. Stopping Nifedipine. O2 saturations decreasing, notably pt laying in bed and not taking deep respirations, helped patient to sit up and encouraging IS 10x every hour while awake. Increased O2 to 3L and will monitor and wean throughout day. Minimal appetite, drinking boosts. Poor sleep. Discussed starting Remeron. Otherwise, she continues to feel improved/stronger. CMP improving following initiation of fluids. Awaiting SNF placement.    Objective:     Vital Signs (Most Recent):  Temp: 96.1 °F (35.6 °C) (05/05/22 0830)  Pulse: 73 (05/05/22 0830)  Resp: 17 (05/05/22 0830)  BP: (!) 101/51 (05/05/22 0830)  SpO2: (!) 93 % (05/05/22 0830)   Vital Signs (24h Range):  Temp:  [96.1 °F (35.6 °C)-98.1 °F (36.7 °C)] 96.1 °F (35.6 °C)  Pulse:  [56-79] 73  Resp:  [16-18] 17  SpO2:  [90 %-95 %] 93 %  BP: ()/(51-59) 101/51     Weight: 70.5 kg (155 lb 6.8 oz)  Body mass index is 25.86 kg/m².  Body surface area is 1.8 meters squared.    ECOG SCORE           Intake/Output - Last 3 Shifts         05/03 0700  05/04 0659 05/04 0700  05/05 0659 05/05 0700  05/06 0659    P.O. 880 2040 480    I.V. (mL/kg)  1970.1 (27.7)     IV Piggyback 292.5      Total Intake(mL/kg) 1172.5 (16.5) 4010.1 (56.5) 480 (6.8)    Urine (mL/kg/hr) 2050 (1.2) 2050 (1.2)     Stool       Total Output 2050 2050     Net -877.5 +1960.1 +480           Urine Occurrence 1 x              Physical Exam  Constitutional:       Appearance: She is well-developed.   HENT:      Head: Normocephalic and atraumatic.      Mouth/Throat:      Mouth: Mucous membranes are moist.      Pharynx: No oropharyngeal exudate.   Eyes:      Conjunctiva/sclera: Conjunctivae normal.      Pupils: Pupils are equal, round,  and reactive to light.   Cardiovascular:      Rate and Rhythm: Normal rate and regular rhythm.      Heart sounds: Normal heart sounds. No murmur heard.  Pulmonary:      Effort: Pulmonary effort is normal.      Breath sounds: Normal breath sounds. No wheezing or rales.      Comments: Diminished lung sounds  Abdominal:      General: Bowel sounds are normal. There is no distension.      Palpations: Abdomen is soft.      Tenderness: There is no abdominal tenderness.   Musculoskeletal:         General: No deformity. Normal range of motion.      Cervical back: Normal range of motion and neck supple.      Comments: Redness to bilat great toes.   Skin:     General: Skin is warm and dry.      Findings: No erythema or rash.      Comments: Right chest wall port. Dressing c/d/i. No sign of infection to site.   Neurological:      Mental Status: She is alert and oriented to person, place, and time.      Cranial Nerves: No cranial nerve deficit.      Motor: Weakness (generalized weakness) present.   Psychiatric:         Behavior: Behavior normal.         Thought Content: Thought content normal.         Judgment: Judgment normal.       Significant Labs:   CBC:   Recent Labs   Lab 05/04/22  0501 05/05/22  0514   WBC 6.04 6.55   HGB 8.1* 7.2*   HCT 25.0* 22.6*    156    and CMP:   Recent Labs   Lab 05/04/22  0501 05/04/22  0824 05/05/22  0514   * 129* 130*   K 3.9 3.7 3.7   CL 89* 91* 94*   CO2 32* 31* 29    92 135*   BUN 16 16 19   CREATININE 0.7 0.6 0.6   CALCIUM 8.3* 8.2* 8.1*   PROT 5.3*  --  5.1*   ALBUMIN 2.0*  --  1.9*   BILITOT 0.6  --  0.4   ALKPHOS 78  --  85   AST 32  --  39   ALT 42  --  39   ANIONGAP 7* 7* 7*   EGFRNONAA >60.0 >60.0 >60.0       Diagnostic Results:  None    Assessment/Plan:     * Acute gout of foot  - patient reports difficulty bearing weight on bilat feet due to pain, improving  - x-ray showing soft tissue edema suggestive of gout flare  - uric acid wnl  - continue colchicine 0.6mg  daily until flare resolves  - start allopurinol 100 mg daily for prevention  - cooling muscle cream PRN for additional pain support     Acute respiratory failure with hypoxia  Patient admitted with acute hypoxic respiratory failure, she was treated for PNA during recent admission and was initiated on home oxygen after 6 min walk test previous admission.  - CXR with pulmonary consolidation and fever on admission, recently completed levaquin/azithro course for CAP.   - Started on Cefepime on admission, further infectious work up unrevealing and afebrile >48 hours, will discontinued Cefepime 5/3  - O2 saturations decreased this AM, suspect d/t positioning and poor respiratory effort in bed. Encouraged sitting up, incentive spirometry, and increasing supplemental O2 with intent to wean as appropriate.     MDS (myelodysplastic syndrome)  - Primary oncologist, Dr. 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 Developed allergy and was then desensitized. Soon after developed pancytopenia and Revlimid was stopped June 2017.    - BMBX on 6/8/17 was with normal cytogenetics. 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 Of 20 metaphases, 5 were normal and 15 had a 5q deletion, NGS positive for SF3B1 and TP53 (12%). 1.4% blasts  - Received vidaza for 22 cycles, received Decitabine for C23 due to national shortage of vidaza and then back to vidaza with C24.  - completed cycle 39 on 4/8/22  - due for cycle 40 on 05/02/2022,  postponed whiled admitted and to be readdressed as outpatient     Essential hypertension  - At home regimen: lisinopril/hctz and coreg  - SBP 170s-180s. Increased Coreg dose to 25mg with minimal improveemt  - Discussed with cardiology who recommended switching HCTZ to Chlorthalidone 25mg daily and adding Nifedipine XL 60mg with outpatient follow up. This AM BP slightly soft (115s/50s), will adjust to Nifedipine XL 30mg to allow for some permissible HTN in this anemic/deconditioned pt  - Current anti-HTN regimen: Lisinopril 40mg daily, Coreg 25mg BID, Chlorthalidone 25mg daily. Stopping nifedipine today d/t hypotension.   - Will need cardiology f/u outpatient    Poor appetite  - Poor appetite, chronic  - Boost supplements available  - Adding remeron qhs to assist with appetite, mood, insomnia    Hyponatremia  - Suspect insetting of hypovolemia  - Mild, Na 130 today. Improving on IVF    Fever  - Patient had Tmax 101.2 on 4/30  - Started on cefepime; discontinued 5/3  - Blood cultures with NGTD  - Chest x-ray on admission showing possible developing consolidation    Physical debility  - reports difficulty with ambulation due to pain and weakness  - PT/OT consulted. Recommending SNF.  - Adding Boost (glucose control) with meals for nutritional support    Pancytopenia due to antineoplastic chemotherapy  - Transfuse for hgb < 7 or plts < 10K  - Daily cbc while inpatient  - Continue acyclovir and fluconazole ppx  - Cipro ppx resumed 5/4    CAP (community acquired pneumonia)  - Pt with 2 weeks of productive cough, nasal congestion  - Upon arrival to the hospial she was hypoxic requiring 3L NC, weaned to 2L today  - CXR with likely develop consolidation  - CTA shows New patchy consolidative opacities in the in the right upper and posterior right lower lobes.  Findings suggestive infectious etiology such as pneumonia, multifocal pneumonia.  Also consider atypical given patient's reported immunocompromised status.  Aspiration  could present similarly.  Consider continued follow-up after acute clinical illness has resolved to ensure resolution. New mediastinal and bilateral hilar lymphadenopathy, likely reactive.  - completed azithromycin and 7 day course of Levaquin on 4/30.  - CXR on admission showing possible developing consolidation: Cefepime 4/30-5/3     Controlled type 2 diabetes mellitus without complication, without long-term current use of insulin  - Will hold home po diabetic medications  - SSI, ACHS blood glucose monitoring, and diabetic diet  - Goal bg 140-180    Hypokalemia  - K+ 3.7 today  - replacing per prn order set  - daily CMP while inpatient    Insomnia  - Continue nightly melatonin  - Adding remeron 5/5    Adjustment disorder with mixed anxiety and depressed mood  - continue home celexa    CAD (coronary artery disease)  - S/P left carotid endarterectomy prior to cancer dx, s/p PCI (3 stents) >20 years ago   - Continue on ASA 81mg daily, platelets are wnl  - On repatha as outpatient (statin intolerant)        VTE Risk Mitigation (From admission, onward)         Ordered     enoxaparin injection 40 mg  Daily         04/29/22 1651     IP VTE HIGH RISK PATIENT  Once         04/29/22 1651     Place sequential compression device  Until discontinued         04/29/22 1651                Disposition: Remain inpatient while awaiting SNF placement    Jolanta Guy PA-C  Bone Marrow Transplant  [facility] - Oncology ([facility])

## 2022-05-05 NOTE — PROGRESS NOTES
Pt scored a 10 during her distress assessment on 5/4/2022. Pt reports that she scored a 10 because of her new pain in her bilateral feel and being unable to walk. Pt also reports that she is anxious about going to SNF. Pt reports that she is grateful for the support that she receives from her . Pt identifies herself as a born again [employer]. Pt reports that now she is feeling optimistic about getting therapy at SNF and regaining her level of functioning.   Pt is in agreement with seeing Oncology Psychologist. Pt is referred for a visit with Oncology Psychologist. Pt denies SI/HI. LCSW will follow closely.   Oncology LCSW faxed and emailed pt's clinical information to [facility], [facility] and [facility]. LCSW will follow up with all three facilities.

## 2022-05-05 NOTE — PLAN OF CARE
Problem: Adult Inpatient Plan of Care  Goal: Plan of Care Review  Outcome: Ongoing, Progressing  Goal: Patient-Specific Goal (Individualized)  Outcome: Ongoing, Progressing  Goal: Absence of Hospital-Acquired Illness or Injury  Outcome: Ongoing, Progressing  Goal: Optimal Comfort and Wellbeing  Outcome: Ongoing, Progressing  Goal: Readiness for Transition of Care  Outcome: Ongoing, Progressing     Problem: Diabetes Comorbidity  Goal: Blood Glucose Level Within Targeted Range  Outcome: Ongoing, Progressing     Problem: Adjustment to Illness (Sepsis/Septic Shock)  Goal: Optimal Coping  Outcome: Ongoing, Progressing     Problem: Bleeding (Sepsis/Septic Shock)  Goal: Absence of Bleeding  Outcome: Ongoing, Progressing     Problem: Glycemic Control Impaired (Sepsis/Septic Shock)  Goal: Blood Glucose Level Within Desired Range  Outcome: Ongoing, Progressing     Problem: Infection Progression (Sepsis/Septic Shock)  Goal: Absence of Infection Signs and Symptoms  Outcome: Ongoing, Progressing     Problem: Nutrition Impaired (Sepsis/Septic Shock)  Goal: Optimal Nutrition Intake  Outcome: Ongoing, Progressing     Problem: Fluid and Electrolyte Imbalance (Acute Kidney Injury/Impairment)  Goal: Fluid and Electrolyte Balance  Outcome: Ongoing, Progressing     Problem: Oral Intake Inadequate (Acute Kidney Injury/Impairment)  Goal: Optimal Nutrition Intake  Outcome: Ongoing, Progressing     Problem: Renal Function Impairment (Acute Kidney Injury/Impairment)  Goal: Effective Renal Function  Outcome: Ongoing, Progressing     Problem: Fluid Imbalance (Pneumonia)  Goal: Fluid Balance  Outcome: Ongoing, Progressing     Problem: Infection (Pneumonia)  Goal: Resolution of Infection Signs and Symptoms  Outcome: Ongoing, Progressing     Problem: Respiratory Compromise (Pneumonia)  Goal: Effective Oxygenation and Ventilation  Outcome: Ongoing, Progressing     Problem: Infection  Goal: Absence of Infection Signs and Symptoms  Outcome:  "Ongoing, Progressing     Problem: Skin Injury Risk Increased  Goal: Skin Health and Integrity  Outcome: Ongoing, Progressing     Problem: Impaired Wound Healing  Goal: Optimal Wound Healing  Outcome: Ongoing, Progressing     Problem: Fall Injury Risk  Goal: Absence of Fall and Fall-Related Injury  Outcome: Ongoing, Progressing     Patient is AAO x3-4, pleasant and cooperative.  Patient reported discomfort and no pain.  "My feet are so tender from the gout,"  Its a shame I cant stand on them."  Patient voids per pur wick,  staff emptied and recorded 800 cc of urine.  ! BM this shift. Skin intact., mepliex intact.   R chest wall rommel cath intact and patient. New orders: incentive spirometer at bedside and HOB elevated  and patient educated on use.  Meds:  Remeron 7.5 at bedtime to improve mood and increase appetite.  Patient reported a poor appetite, but will consume Boost.  Dietician consulted and Allison Maria ordered.  Consults Hematology/Oncology and psychology [ psychology visited patient.]  NS dc'd.  LabS: K+ 3.7, Na: 130, wbc: 6.  CBG stable and monitored ac/hs: B: 145 L: 177 D: 116. No coverage needed.  DC planning is awaiting on SNF/Rehab placement.  Patient in agreement with current POC. Safety rounds completed and bed in low position, personal items and call light within reach.  SHANNAN VALENCIA and endorse to oncoming nurse.  "

## 2022-05-05 NOTE — ASSESSMENT & PLAN NOTE
Patient with increased anxiety, depression, insomnia due to increased debility and psychosocial strains.  Patient is hopeful that addition of Remeron by primary team will be helpful.    I will follow up with patient outpatient after discharge from SNF.    I will relay patients concerns to social work team.    Patient may benefit from connection to palliative care team to discuss QOL and care planning issues (she is agreeable if acceptable to the team).

## 2022-05-05 NOTE — ASSESSMENT & PLAN NOTE
- At home regimen: lisinopril/hctz and coreg  - SBP 170s-180s. Increased Coreg dose to 25mg with minimal improveemt  - Discussed with cardiology who recommended switching HCTZ to Chlorthalidone 25mg daily and adding Nifedipine XL 60mg with outpatient follow up. This AM BP slightly soft (115s/50s), will adjust to Nifedipine XL 30mg to allow for some permissible HTN in this anemic/deconditioned pt  - Current anti-HTN regimen: Lisinopril 40mg daily, Coreg 25mg BID, Chlorthalidone 25mg daily. Stopping nifedipine today d/t hypotension.   - Will need cardiology f/u outpatient

## 2022-05-05 NOTE — PHYSICIAN QUERY
"PT Name: Susan Puente  MR #: [id number]     DOCUMENTATION CLARIFICATION     CDS: Rafiq De La Cruz RN CCDS               Contact information: [email or web address]   This form is a permanent document in the medical record.     Query Date: May 5, 2022    By submitting this query, we are merely seeking further clarification of documentation.  Please utilize your independent clinical judgment when addressing the question(s) below.  The Medical Record contains the following     Indicators   Supporting Clinical Findings Location in Medical Record     x SOB, VENEGAS, Wheezing, Productive Cough, Use of Accessory Muscles, etc. tachypnea and increased work of breathing, 2 weeks of productive cough 4/29/2022 H&P     x RR         ABGs         O2 sat O2 sats 94-99% on 2L O2, RR 18-24  O2 sats 92-98% on 2L O2, RR 16-18 4/29/2022 Vitals  5/2/2022 Vitals     x Hypoxia/Hypercapnia Upon arrival to the hospital, she was hypoxic 4/29/2022 H&P    BiPAP/Intubation/Mechanical Ventilation       x Supplemental O2 O2 @ 2L  requiring 3L NC 4/29-5/5/2022 Vitals  4/29/2022 H&P     x Home O2, Oxygen Dependence She was discharged 4/27/22 with home health and home oxygen. 4/29/2022 H&P     x Respiratory distress or failure Acute respiratory failure with hypoxia 4/29/2022 H&P     x  Radiology findings Findings concerning for developing consolidation projected over the right midlung zone.  Findings are superimposed upon chronic change or edema.  Correlation and follow-up is advised. 4/29/2022 Chest x-ray     x Acute/Chronic Illness Acute gout of foot  CAP  MDS  Pancytopenia due to antineoplastic chemotherapy 4/29/2022 H&P    Treatment       x Other Reports her breathing is improving "every day" at home.    Negative for shortness of breath (improving on home O2).      She was admitted 4/23/22 to 4/27/22 with CAP. She was discharged on LVQ, which she will complete on 4/30.  4/29/2022 ED provider notes      4/29/2022 H&P       The noted clinical " guidelines are only system guidelines and do not replace the providers clinical judgment.    Provider, please specify the diagnosis or diagnoses associated with above clinical findings.     [X] Acute Respiratory Failure with Hypoxia (ABG pO2 < 60 mmHg or O2 sat of <91% on room air and respiratory symptoms documented) diagnosis is confirmed and additional clinical support/decision-making indicators for the diagnosis include (please specify): _______________________________________________   [    ] Chronic Respiratory Failure with Hypoxia only - Continuous home oxygen use   [    ] Hypoxia Only on this Admit.   [    ] Other Respiratory Diagnosis (please specify): _________________   [   ] Clinically Undetermined     Clinical support: Pt with increasing O2 requirements, averaging 4L O2 with ~93% saturations. Worsening RML consolidation despite antibiotic therapy. Pulmonary consult and bronchoscopy performed, awaiting results.         Please document in your progress notes daily for the duration of treatment until resolved and include in your discharge summary.       Form No. [id number]

## 2022-05-05 NOTE — PT/OT/SLP PROGRESS
Occupational Therapy   Treatment    Name: Susan Puente  MRN: [id number]  Admitting Diagnosis:  Acute gout of foot       Recommendations:     Discharge Recommendations: nursing facility, skilled  Discharge Equipment Recommendations:   (TBD)  Barriers to discharge:  Inaccessible home environment    Assessment:     Susan Puente is a 71 y.o. female with a medical diagnosis of Acute gout of foot.  She presents with deficits in mobility and self-care tasks 2/2 10/10 pain in BLE (feet).  Pt. Was able to perform bed mobility with Mod A with assist to manage BLE in and out of bed.  Pt. Is able to sit EOB with Supervision to engaged in grooming and hygiene tasks. Pt. Instructed in BUE there ex with use of red theraband and performed there ex x 1 set 15 reps. Pt. Would benefit from continued OT services.  Performance deficits affecting function are weakness, impaired endurance, impaired self care skills, impaired functional mobilty, pain, decreased lower extremity function.     Rehab Prognosis:  Good; patient would benefit from acute skilled OT services to address these deficits and reach maximum level of function.       Plan:     Patient to be seen 3 x/week to address the above listed problems via self-care/home management, therapeutic activities, therapeutic exercises, neuromuscular re-education  · Plan of Care Expires: 05/30/22  · Plan of Care Reviewed with: patient    Subjective     Pain/Comfort:  · Pain Rating 1: 10/10  · Location - Side 1: Bilateral  · Location 1: foot  · Pain Addressed 1: Reposition, Distraction, Nurse notified  · Pain Rating Post-Intervention 1: 10/10    Objective:     Communicated with: nurse prior to session.  Patient found supine with PureWick, telemetry  And 02 upon OT entry to room.    General Precautions: Standard, fall , port  Orthopedic Precautions:N/A   Braces: N/A  Respiratory Status: 02 via nasal cannula   Occupational Performance:     Bed Mobility:    · Patient completed Supine  to Sit with moderate assistance  · Patient completed Sit to Supine with moderate assistance     Functional Mobility/Transfers:  · Pt. Required Max A to scoot along EOB to HOB  · Functional Mobility: not tested    Activities of Daily Living:  · Grooming: supervision seated EOB to rinse mouth, wash face and comb hair  · Bathing: stand by assistance to use cleaning wipes for face, nech and under arms      [facility] 6 Click ADL: 18    Treatment & Education:  Pt. Performed 1 set 15 reps of AROM for ankle pumps seated EOB  Pt. Performed 1 set 15 reps of BUE there ex with red theraband for shoulder horizontal  abd as well as elbow flex/ext    Patient left supine with all lines intact, call button in reach and psychologist present presentEducation:      GOALS:   Multidisciplinary Problems     Occupational Therapy Goals        Problem: Occupational Therapy    Goal Priority Disciplines Outcome Interventions   Occupational Therapy Goal     OT, PT/OT Ongoing, Progressing    Description: Goals to be met by: 5/14/22     Patient will increase functional independence with ADLs by performing:    LE Dressing with Set-up Assistance.  Grooming while standing at sink with Stand-by Assistance.  Toileting from toilet with Minimal Assistance for hygiene and clothing management.   Toilet transfer to toilet with Stand-by Assistance.  Functional mobility of household and community distance with  minimum assistance and AD as needed                       Time Tracking:     OT Date of Treatment: 05/05/22  OT Start Time: 1404  OT Stop Time: 1433  OT Total Time (min): 29 min    Billable Minutes:Self Care/Home Management 15  Therapeutic Exercise 14    OT/ADRYAN: OT          5/5/2022

## 2022-05-05 NOTE — SUBJECTIVE & OBJECTIVE
Subjective:     Interval History: Pt afebrile, soft BP overnight/this AM. Stopping Nifedipine. O2 saturations decreasing, notably pt laying in bed and not taking deep respirations, helped patient to sit up and encouraging IS 10x every hour while awake. Increased O2 to 3L and will monitor and wean throughout day. Minimal appetite, drinking boosts. Poor sleep. Discussed starting Remeron. Otherwise, she continues to feel improved/stronger. CMP improving following initiation of fluids. Awaiting SNF placement.    Objective:     Vital Signs (Most Recent):  Temp: 96.1 °F (35.6 °C) (05/05/22 0830)  Pulse: 73 (05/05/22 0830)  Resp: 17 (05/05/22 0830)  BP: (!) 101/51 (05/05/22 0830)  SpO2: (!) 93 % (05/05/22 0830)   Vital Signs (24h Range):  Temp:  [96.1 °F (35.6 °C)-98.1 °F (36.7 °C)] 96.1 °F (35.6 °C)  Pulse:  [56-79] 73  Resp:  [16-18] 17  SpO2:  [90 %-95 %] 93 %  BP: ()/(51-59) 101/51     Weight: 70.5 kg (155 lb 6.8 oz)  Body mass index is 25.86 kg/m².  Body surface area is 1.8 meters squared.    ECOG SCORE           Intake/Output - Last 3 Shifts         05/03 0700  05/04 0659 05/04 0700  05/05 0659 05/05 0700  05/06 0659    P.O. 880 2040 480    I.V. (mL/kg)  1970.1 (27.7)     IV Piggyback 292.5      Total Intake(mL/kg) 1172.5 (16.5) 4010.1 (56.5) 480 (6.8)    Urine (mL/kg/hr) 2050 (1.2) 2050 (1.2)     Stool       Total Output 2050 2050     Net -877.5 +1960.1 +480           Urine Occurrence 1 x              Physical Exam  Constitutional:       Appearance: She is well-developed.   HENT:      Head: Normocephalic and atraumatic.      Mouth/Throat:      Mouth: Mucous membranes are moist.      Pharynx: No oropharyngeal exudate.   Eyes:      Conjunctiva/sclera: Conjunctivae normal.      Pupils: Pupils are equal, round, and reactive to light.   Cardiovascular:      Rate and Rhythm: Normal rate and regular rhythm.      Heart sounds: Normal heart sounds. No murmur heard.  Pulmonary:      Effort: Pulmonary effort is normal.       Breath sounds: Normal breath sounds. No wheezing or rales.      Comments: Diminished lung sounds  Abdominal:      General: Bowel sounds are normal. There is no distension.      Palpations: Abdomen is soft.      Tenderness: There is no abdominal tenderness.   Musculoskeletal:         General: No deformity. Normal range of motion.      Cervical back: Normal range of motion and neck supple.      Comments: Redness to bilat great toes.   Skin:     General: Skin is warm and dry.      Findings: No erythema or rash.      Comments: Right chest wall port. Dressing c/d/i. No sign of infection to site.   Neurological:      Mental Status: She is alert and oriented to person, place, and time.      Cranial Nerves: No cranial nerve deficit.      Motor: Weakness (generalized weakness) present.   Psychiatric:         Behavior: Behavior normal.         Thought Content: Thought content normal.         Judgment: Judgment normal.       Significant Labs:   CBC:   Recent Labs   Lab 05/04/22  0501 05/05/22  0514   WBC 6.04 6.55   HGB 8.1* 7.2*   HCT 25.0* 22.6*    156    and CMP:   Recent Labs   Lab 05/04/22  0501 05/04/22  0824 05/05/22  0514   * 129* 130*   K 3.9 3.7 3.7   CL 89* 91* 94*   CO2 32* 31* 29    92 135*   BUN 16 16 19   CREATININE 0.7 0.6 0.6   CALCIUM 8.3* 8.2* 8.1*   PROT 5.3*  --  5.1*   ALBUMIN 2.0*  --  1.9*   BILITOT 0.6  --  0.4   ALKPHOS 78  --  85   AST 32  --  39   ALT 42  --  39   ANIONGAP 7* 7* 7*   EGFRNONAA >60.0 >60.0 >60.0       Diagnostic Results:  None

## 2022-05-05 NOTE — ASSESSMENT & PLAN NOTE
Patient admitted with acute hypoxic respiratory failure, she was treated for PNA during recent admission and was initiated on home oxygen after 6 min walk test previous admission.  - CXR with pulmonary consolidation and fever on admission, recently completed levaquin/azithro course for CAP.   - Started on Cefepime on admission, further infectious work up unrevealing and afebrile >48 hours, will discontinued Cefepime 5/3  - O2 saturations decreased this AM, suspect d/t positioning and poor respiratory effort in bed. Encouraged sitting up, incentive spirometry, and increasing supplemental O2 with intent to wean as appropriate.

## 2022-05-05 NOTE — ASSESSMENT & PLAN NOTE
- Poor appetite, chronic  - Boost supplements available  - Adding remeron qhs to assist with appetite, mood, insomnia

## 2022-05-05 NOTE — PROGRESS NOTES
[facility] ([facility])  Psychology  Progress Note  Individual Psychotherapy (PhD/LCSW)    Patient Name: Susan Puente  MRN: [id number]    Patient Class: IP- Inpatient  Admission Date: 4/29/2022  Hospital Length of Stay: 3 days  Attending Physician: Fidelina Tang MD  Primary Care Provider: Claudia Rapp MD    Therapeutic Intervention: Met with patient.  Inpatient/Partial Hospital - Supportive psychotherapy 60 min - CPT Code 14842    Chief Complaint/Reason for Encounter: adaptation to disease and treatment, depression and anxiety     Interval History and Content of Current Session: Patient discussed recent health challenges and ongoing symptom management efforts. She reports recent improvement in pain management. She is hopeful that the addition of Remeron will effectively treat her insomnia. Patient describes worry about practical and financial burdens moving forward.    Risk Parameters:  Patient reports no suicidal ideation  Patient reports no homicidal ideation  Patient reports no self-injurious behavior  Patient reports no violent behavior    Verbal Deficits: None    Patient's response to intervention:  The patient's response to intervention is accepting.      Diagnostic Impression - Plan:     Adjustment disorder with mixed anxiety and depressed mood  Patient with increased anxiety, depression, insomnia due to increased debility and psychosocial strains.  Patient is hopeful that addition of Remeron by primary team will be helpful.    I will follow up with patient outpatient after discharge from SNF.    I will relay patients concerns to social work team.    Patient may benefit from connection to palliative care team to discuss QOL and care planning issues (she is agreeable if acceptable to the team).          Return to Clinic: after SNF    Length of Service (minutes): 60    Nando Marie, PhD  Psychology  [facility])

## 2022-05-05 NOTE — HPI
Susan Puente, a 71 y.o. female, for therapy visit (patient known to me).  Met with patient.    Chief Complaint/Reason for Encounter: adaptation to disease and treatment, depression and anxiety

## 2022-05-05 NOTE — PLAN OF CARE
Patient resting in bed: side rails up x 2, lowest position, bed ,locked and call bell in place. Plan of care was reviewed with patient. She is tolerating PO intake She states she does not have an appetite but are drinking a Boost for each meal. She continues to be bed bound but was more willing to turn over night; purewick changed and in place to continuous suction. Afebrile and vitals remain stable. Labs obtained, no PRN electrolyte orders. Frequent rounding completed and patient encouraged to call as needed. Will continue to monitor patient.       Problem: Adult Inpatient Plan of Care  Goal: Plan of Care Review  Outcome: Ongoing, Progressing  Goal: Patient-Specific Goal (Individualized)  Outcome: Ongoing, Progressing  Goal: Absence of Hospital-Acquired Illness or Injury  Outcome: Ongoing, Progressing  Goal: Optimal Comfort and Wellbeing  Outcome: Ongoing, Progressing  Goal: Readiness for Transition of Care  Outcome: Ongoing, Progressing     Problem: Diabetes Comorbidity  Goal: Blood Glucose Level Within Targeted Range  Outcome: Ongoing, Progressing     Problem: Adjustment to Illness (Sepsis/Septic Shock)  Goal: Optimal Coping  Outcome: Ongoing, Progressing     Problem: Bleeding (Sepsis/Septic Shock)  Goal: Absence of Bleeding  Outcome: Ongoing, Progressing     Problem: Glycemic Control Impaired (Sepsis/Septic Shock)  Goal: Blood Glucose Level Within Desired Range  Outcome: Ongoing, Progressing     Problem: Infection Progression (Sepsis/Septic Shock)  Goal: Absence of Infection Signs and Symptoms  Outcome: Ongoing, Progressing     Problem: Nutrition Impaired (Sepsis/Septic Shock)  Goal: Optimal Nutrition Intake  Outcome: Ongoing, Progressing     Problem: Fluid and Electrolyte Imbalance (Acute Kidney Injury/Impairment)  Goal: Fluid and Electrolyte Balance  Outcome: Ongoing, Progressing     Problem: Oral Intake Inadequate (Acute Kidney Injury/Impairment)  Goal: Optimal Nutrition Intake  Outcome: Ongoing,  Progressing     Problem: Renal Function Impairment (Acute Kidney Injury/Impairment)  Goal: Effective Renal Function  Outcome: Ongoing, Progressing     Problem: Fluid Imbalance (Pneumonia)  Goal: Fluid Balance  Outcome: Ongoing, Progressing     Problem: Infection (Pneumonia)  Goal: Resolution of Infection Signs and Symptoms  Outcome: Ongoing, Progressing     Problem: Respiratory Compromise (Pneumonia)  Goal: Effective Oxygenation and Ventilation  Outcome: Ongoing, Progressing     Problem: Infection  Goal: Absence of Infection Signs and Symptoms  Outcome: Ongoing, Progressing     Problem: Skin Injury Risk Increased  Goal: Skin Health and Integrity  Outcome: Ongoing, Progressing     Problem: Impaired Wound Healing  Goal: Optimal Wound Healing  Outcome: Ongoing, Progressing     Problem: Fall Injury Risk  Goal: Absence of Fall and Fall-Related Injury  Outcome: Ongoing, Progressing

## 2022-05-05 NOTE — SUBJECTIVE & OBJECTIVE
Therapeutic Intervention: Met with patient.  Inpatient/Partial Hospital - Supportive psychotherapy 60 min - CPT Code 29349    Chief Complaint/Reason for Encounter: adaptation to disease and treatment, depression and anxiety     Interval History and Content of Current Session: Patient discussed recent health challenges and ongoing symptom management efforts. She reports recent improvement in pain management. She is hopeful that the addition of Remeron will effectively treat her insomnia. Patient describes worry about practical and financial burdens moving forward.    Risk Parameters:  Patient reports no suicidal ideation  Patient reports no homicidal ideation  Patient reports no self-injurious behavior  Patient reports no violent behavior    Verbal Deficits: None    Patient's response to intervention:  The patient's response to intervention is accepting.

## 2022-05-06 NOTE — PROGRESS NOTES
Burak Cordova - Oncology ([facility])  Hematology  Bone Marrow Transplant  Progress Note    Patient Name: Susan Puente  Admission Date: 4/29/2022  Hospital Length of Stay: 4 days  Code Status: Full Code    Subjective:     Interval History: Pt afebrile, BP improving following discontinuation of nifedipine. O2 sats remain low despite improved positioning, increased O2 supplementation, and incentive spirometry. Subjectively, pt denies SOB. Repeating CT Chest today for further evaluation. Continues to drink boosts/supplemental shakes. Continues to feel stronger/improved and without new complaints today. Awaiting SNF placement.     Objective:     Vital Signs (Most Recent):  Temp: 98.2 °F (36.8 °C) (05/06/22 0817)  Pulse: 79 (05/06/22 0817)  Resp: 18 (05/06/22 0817)  BP: (!) 122/58 (05/06/22 0817)  SpO2: (!) 90 % (05/06/22 0817)   Vital Signs (24h Range):  Temp:  [96 °F (35.6 °C)-98.7 °F (37.1 °C)] 98.2 °F (36.8 °C)  Pulse:  [67-79] 79  Resp:  [17-20] 18  SpO2:  [90 %-94 %] 90 %  BP: (117-133)/(56-61) 122/58     Weight: 70.5 kg (155 lb 6.8 oz)  Body mass index is 25.86 kg/m².  Body surface area is 1.8 meters squared.    ECOG SCORE             Intake/Output - Last 3 Shifts         05/04 0700  05/05 0659 05/05 0700 05/06 0659 05/06 0700 05/07 0659    P.O. 2040 1800 598    I.V. (mL/kg) 1970.1 (27.7)      IV Piggyback       Total Intake(mL/kg) 4010.1 (56.5) 1800 (25.5) 598 (8.5)    Urine (mL/kg/hr) 2050 (1.2) 1300 (0.8)     Total Output 2050 1300     Net +1960.1 +500 +598           Stool Occurrence   1 x            Physical Exam  Vitals and nursing note reviewed.   Constitutional:       Appearance: She is well-developed.   HENT:      Head: Normocephalic and atraumatic.      Mouth/Throat:      Mouth: Mucous membranes are moist.      Pharynx: No oropharyngeal exudate.   Eyes:      Conjunctiva/sclera: Conjunctivae normal.      Pupils: Pupils are equal, round, and reactive to light.   Cardiovascular:      Rate and Rhythm:  Normal rate and regular rhythm.      Heart sounds: Normal heart sounds. No murmur heard.  Pulmonary:      Effort: Pulmonary effort is normal.      Breath sounds: Normal breath sounds. No wheezing or rales.      Comments: Diminished lung sounds  Abdominal:      General: Bowel sounds are normal. There is no distension.      Palpations: Abdomen is soft.      Tenderness: There is no abdominal tenderness.   Musculoskeletal:         General: No deformity. Normal range of motion.      Cervical back: Normal range of motion and neck supple.      Comments: Redness to bilat great toes.   Skin:     General: Skin is warm and dry.      Findings: No erythema or rash.      Comments: Right chest wall port. Dressing c/d/i. No sign of infection to site.   Neurological:      Mental Status: She is alert and oriented to person, place, and time.      Cranial Nerves: No cranial nerve deficit.      Motor: Weakness (generalized weakness) present.   Psychiatric:         Behavior: Behavior normal.         Thought Content: Thought content normal.         Judgment: Judgment normal.       Significant Labs:   CBC:   Recent Labs   Lab 05/05/22  0514 05/06/22  0509   WBC 6.55 7.65   HGB 7.2* 6.8*   HCT 22.6* 21.8*    144*    and CMP:   Recent Labs   Lab 05/05/22  0514 05/06/22  0509   * 130*   K 3.7 4.0   CL 94* 96   CO2 29 28   * 102   BUN 19 16   CREATININE 0.6 0.6   CALCIUM 8.1* 8.8   PROT 5.1* 5.6*   ALBUMIN 1.9* 1.8*   BILITOT 0.4 0.4   ALKPHOS 85 78   AST 39 35   ALT 39 39   ANIONGAP 7* 6*   EGFRNONAA >60.0 >60.0       Diagnostic Results:  None    Assessment/Plan:     * Acute gout of foot  - patient reports difficulty bearing weight on bilat feet due to pain, improving  - x-ray showing soft tissue edema suggestive of gout flare  - uric acid wnl  - continue colchicine 0.6mg daily until flare resolves  - start allopurinol 100 mg daily for prevention  - cooling muscle cream PRN for additional pain support     Acute respiratory  failure with hypoxia  Patient admitted with acute hypoxic respiratory failure, she was treated for PNA during recent admission and was initiated on home oxygen after 6 min walk test previous admission.  - CXR with pulmonary consolidation and fever on admission, recently completed levaquin/azithro course for CAP.   - Started on Cefepime on admission, further infectious work up unrevealing and afebrile >48 hours, will discontinued Cefepime 5/3   - O2 saturations decreased this AM with increasing O2 support, Now low 90% saturations despite 3L O2.   - Repeating CT Chest today    MDS (myelodysplastic syndrome)  - Primary oncologist, Dr. 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 Developed allergy and was then desensitized. Soon after developed pancytopenia and Revlimid was stopped June 2017.    - BMBX on 6/8/17 was with normal cytogenetics. 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 Of 20 metaphases, 5 were normal and 15 had a 5q deletion, NGS positive for SF3B1 and TP53 (12%). 1.4% blasts  - Received vidaza for 22 cycles, received Decitabine for C23 due to national shortage of vidaza and then back to vidaza with C24.  - completed cycle 39 on 4/8/22  - due for cycle 40 on 05/02/2022, postponed whiled admitted and to be readdressed as outpatient     Essential hypertension  - At home regimen: lisinopril/hctz and coreg  - SBP 170s-180s. Increased Coreg dose to 25mg with minimal improveemt  - Discussed with  cardiology who recommended switching HCTZ to Chlorthalidone 25mg daily and adding Nifedipine XL 60mg with outpatient follow up. This AM BP slightly soft (115s/50s), will adjust to Nifedipine XL 30mg to allow for some permissible HTN in this anemic/deconditioned pt  - Current anti-HTN regimen: Lisinopril 40mg daily, Coreg 25mg BID, Chlorthalidone 25mg daily. Stopping nifedipine today d/t hypotension.   - Will need cardiology f/u outpatient    Poor appetite  - Poor appetite, chronic  - Boost supplements available  - Adding remeron qhs to assist with appetite, mood, insomnia    Hyponatremia  - Suspect insetting of hypovolemia  - Mild, Na 130 today. Improved on IVF    Fever  - Patient had Tmax 101.2 on 4/30  - Started on cefepime; discontinued 5/3  - Blood cultures with NGTD  - Chest x-ray on admission showing possible developing consolidation    Physical debility  - reports difficulty with ambulation due to pain and weakness  - PT/OT consulted. Recommending SNF.  - Adding Boost (glucose control) with meals for nutritional support    Pancytopenia due to antineoplastic chemotherapy  - Transfuse for hgb < 7 or plts < 10K  - Daily cbc while inpatient  - Continue acyclovir and fluconazole ppx  - Cipro ppx resumed 5/4    CAP (community acquired pneumonia)  - Pt with 2 weeks of productive cough, nasal congestion  - Upon arrival to the hospial she was hypoxic requiring 3L NC, weaned to 2L today  - CXR with likely develop consolidation  - CTA shows New patchy consolidative opacities in the in the right upper and posterior right lower lobes.  Findings suggestive infectious etiology such as pneumonia, multifocal pneumonia.  Also consider atypical given patient's reported immunocompromised status.  Aspiration could present similarly.  Consider continued follow-up after acute clinical illness has resolved to ensure resolution. New mediastinal and bilateral hilar lymphadenopathy, likely reactive.  - completed azithromycin and 7 day  course of Levaquin on 4/30.  - CXR on admission showing possible developing consolidation: Cefepime 4/30-5/3   - Repeat CT Chest 5/6    Controlled type 2 diabetes mellitus without complication, without long-term current use of insulin  - Will hold home po diabetic medications  - SSI, ACHS blood glucose monitoring, and diabetic diet  - Goal bg 140-180    Hypokalemia  - K+ 4.0 today  - replacing per prn order set  - daily CMP while inpatient    Insomnia  - Continue nightly melatonin  - Adding remeron 5/5    Adjustment disorder with mixed anxiety and depressed mood  - continue home celexa    CAD (coronary artery disease)  - S/P left carotid endarterectomy prior to cancer dx, s/p PCI (3 stents) >20 years ago   - Continue on ASA 81mg daily, platelets are wnl  - On repatha as outpatient (statin intolerant)        VTE Risk Mitigation (From admission, onward)         Ordered     enoxaparin injection 40 mg  Daily         04/29/22 1651     IP VTE HIGH RISK PATIENT  Once         04/29/22 1651     Place sequential compression device  Until discontinued         04/29/22 1651                Disposition: Remain inpatient while awaiting SNF     Jolanta Guy PA-C  Bone Marrow Transplant  [facility]ak Cordova - Oncology ([facility])

## 2022-05-06 NOTE — PLAN OF CARE
Patient alert, oriented cooperative, pleasant. POC reviewed, verbalized understanding. Repositions throughout this shift.  Safety maintained, call light within reach.    Problem: Adult Inpatient Plan of Care  Goal: Plan of Care Review  Outcome: Ongoing, Progressing     Problem: Adult Inpatient Plan of Care  Goal: Optimal Comfort and Wellbeing  Outcome: Ongoing, Progressing     Problem: Diabetes Comorbidity  Goal: Blood Glucose Level Within Targeted Range  Outcome: Ongoing, Progressing     Problem: Fall Injury Risk  Goal: Absence of Fall and Fall-Related Injury  Outcome: Ongoing, Progressing

## 2022-05-06 NOTE — PT/OT/SLP PROGRESS
Physical Therapy Treatment    Patient Name:  Susan Puente   MRN:  [id number]    Recommendations:     Discharge Recommendations:  nursing facility, skilled   Discharge Equipment Recommendations: other (see comments) (tbd)   Barriers to discharge: None    Assessment:     Susan Puente is a 71 y.o. female admitted with a medical diagnosis of Acute gout of foot.  She presents with the following impairments/functional limitations:  weakness, impaired endurance, impaired self care skills, impaired functional mobilty, pain, decreased lower extremity function. Susan Puente would benefit from acute PT intervention to address listed functional deficits, provide patient/caregiver education, reduce fall risk, and maximize (I) and safety with functional mobility.    Pt tolerated session well but continues to be limited by significant BLE pain. Pt receiving blood transfusion and had just taken medications and was apprehensive of sitting EOB (she usually becomes nauseous); therefore, session focused on therex and education. Pt will continue to benefit from acute PT services in order to maximize safety and (I) with functional mobility.    After hospital discharge, pt would benefit from SNF to continue addressing therapy impairments.    Rehab Prognosis: Good; patient would benefit from acute skilled PT services to address these deficits and reach maximum level of function.      Plan:     During this hospitalization, patient to be seen 4 x/week to address the identified rehab impairments via gait training, therapeutic activities, therapeutic exercises, neuromuscular re-education and progress toward the following goals:    · Plan of Care Expires:  05/30/22    This plan of care has been discussed with the patient, who was included in its development and is in agreement with the identified goals and treatment plan.     Subjective     Communicated with RN prior to session.  Patient agreeable to participate.     Chief  Complaint: pain  Patient/Family Comments/goals: to get better  Pt pain prior to session: did not rate; indicated in BLEs  Pt pain following session: did not rate; indicated in BLEs    Objective:     Patient found HOB elevated with PureWick, telemetry, peripheral IV  upon PT entry to room.    General Precautions: Standard, fall   Orthopedic Precautions:N/A   Braces: N/A     Functional Mobility:    Bed Mobility:  · Not performed on this date; see assessment      Therapeutic Activities/Exercises     Pt performed bed level therex:  2 x 20 ankle pumps  2 x 10 SAQs (LLE)  2 x 10 quad sets (RLE)  2 x 5 glute sets  *Pt required increased time to perform 2/2 pain in LE joints  *PT provided Pt with handwritten HEP; went over each exercise with her to ensure understanding and promote adherence over weekend    Pt educated on importance of participating in PT session to avoid several consecutive days of inactivity.     Patient educated on the importance of early mobility to prevent functional decline during hospital stay    Patient was instructed to utilize staff assistance for mobility/transfers.  Patient was educated on PT POC and all questions answered within PT scope of practice.    Patient able to verbalize understanding; will follow-up with pt during current admit for additional questions/concerns within scope of practice.     White board updated.     AM-PAC 6 CLICK MOBILITY  Total Score:10     Patient left HOB elevated with all lines intact and call button in reach.        History/Goals:     PAST MEDICAL HISTORY:  Past Medical History:   Diagnosis Date    Allergic rhinitis     Allergy     Anemia     Anxiety     CAD (coronary artery disease)     Carotid stenosis     Colon polyps 2016    Controlled type 2 diabetes mellitus without complication, without long-term current use of insulin 10/19/2016    Depression     GERD (gastroesophageal reflux disease)     Hearing loss in right ear     Hx of psychiatric care      Celexa, Prozac    Hypokalemia 2/3/2020    MDS (myelodysplastic syndrome) with 5q deletion     Psychiatric problem     Therapy        Past Surgical History:   Procedure Laterality Date    BONE MARROW BIOPSY Left 2018    Procedure: Biopsy-bone marrow;  Surgeon: Gita Valdes MD;  [facility];  Service: Oncology;  Laterality: Left;    BONE MARROW BIOPSY Left 3/21/2019    Procedure: Biopsy-bone marrow;  Surgeon: Gita Valdes MD;  [facility];  Service: Oncology;  Laterality: Left;    BONE MARROW BIOPSY Left 10/24/2019    Procedure: Biopsy-bone marrow;  Surgeon: Gita Valdes MD;  [facility];  Service: Oncology;  Laterality: Left;  left iliac crest    BONE MARROW BIOPSY Left 2021    Procedure: Biopsy-bone marrow;  Surgeon: Gita Valdes MD;  Location: [facility] ENDO (4TH FLR);  Service: Oncology;  Laterality: Left;    BUNIONECTOMY      CAROTID ENDARTERECTOMY Left     CAROTID STENT      COLONOSCOPY N/A 2016    Procedure: COLONOSCOPY;  Surgeon: PATRICIA Simpson MD;  Location: [facility] ENDO (4TH FLR);  Service: Endoscopy;  Laterality: N/A;  pt has vomiting with anesthesia in past    ENDOMETRIAL ABLATION      for endometriosis    INSERTION OF TUNNELED CENTRAL VENOUS CATHETER (CVC) WITH SUBCUTANEOUS PORT N/A 2020    Procedure: [id number];  Sur[facility]: [facility];  [facility];  Service: Radiology;  Laterality: N/A;  189    TONSILLECTOMY      TYMPANOSTOMY TUBE PLACEMENT         GOALS:   Multidisciplinary Problems     Physical Therapy Goals        Problem: Physical Therapy    Goal Priority Disciplines Outcome Goal Variances Interventions   Physical Therapy Goal     PT, PT/OT Ongoing, Progressing     Description: Goals to be met by: 22    Patient will increase functional independence with mobility by performin. Supine to sit with modified independence  2. Sit to supine with modified independence  3. Sit to stand  transfer with supervision  4. Bed to chair transfer with contact guard assistance using LRAD as needed  5. Gait  x 10 feet with minimum assistance using LRAD as needed  6. Ascend/descend 1 flight of stair with appropriate handrails minimum assistance using LRAD as needed  7. Lower extremity exercise program x10 reps per handout, with IND                     Time Tracking:     PT Received On: 05/06/22  PT Start Time: 1441     PT Stop Time: 1520  PT Total Time (min): 39 min     Billable Minutes: Therapeutic Activity 15 and Therapeutic Exercise 23      Isacc Mccollum, PT  05/06/2022

## 2022-05-06 NOTE — NURSING
Patient received 1 unit of blood at this time, patent received tylenol 650mg po and benadryl 25mg po given prior to blood.  Initially started at 75cc/hr and will increase to 100cc until finished.

## 2022-05-06 NOTE — PROGRESS NOTES
Admit Assessment     Patient Identification  Susan Puente   :  1950  Admit Date:  2022  Attending Provider:  Gita Valdes MD              Referral:   Pt was admitted to  with a diagnosis of Severe sepsis, and was admitted this hospital stay due to Unresponsiveness [R41.89]  Chest pain [R07.9]  Hypotension [I95.9]  Anemia, unspecified type [D64.9]  Sepsis, due to unspecified organism, unspecified whether acute organ dysfunction present [A41.9].   is involved was referred to the Social Work Department via routine referral.  Patient presents as a 71 y.o. year old single female. Pt never  and does not have children. Pt lives alone.   Assessment was completed at bedside with pt only.    Living Situation:      Resides at [address] [place] [address], phone: [phone] (home).       (RETIRED) Functional Status Prior  Ambulation Prior: 0-->independent  Transferrin-->independent  Toiletin-->independent     Current or Past Agencies and Description of Services/Supplies     DME  Agency Name: [facility]  Agency Phone Number: [phone]  Equipment Currently Used at Home: rollator,shower chair     Home Health  Agency Name: [facility]  Agency Phone Number: [phone]  Services: SN and PT     IV Infusion  Pt has no prior use of home IV services. Pt has no preference regarding agency if home infusion is ordered.      Nutrition:   Regular diet- PO     Outpatient Pharmacy:      [facility] [place]  [address] [address]  Phone: [phone] Fax: [phone]     [facility]  [address] [address]  Phone: [phone] Fax: [phone]        Patient Preference of agencies include:  Pt is being recommended for SNF. Pt's first choice is [facility], second choice is [facility] and third choice is [facility].       Patient informed of right to choose  providers or agencies.  Patient provides permission to release any necessary information to [facility] and to Non[facility] agencies as needed to facilitate patient care, treatment planning, and patient discharge planning.  Written and verbal resources provided.        Coping  Pt reports that she is coping well with the support of friends.      Adjustment to Diagnosis and Treatment  Adequate     Emotional/Behavioral/Cognitive Issues  None identified at this time     History/Current Symptoms of Anxiety/Depression: No  History/Current Substance Use: No  Social History            Tobacco Use    Smoking status: Former Smoker       Packs/day: 0.50       Years: 36.00       Pack years: 18.00       Types: Cigarettes, Vaping with nicotine       Quit date: 3/3/2017       Years since quittin.9    Smokeless tobacco: Never Used   Substance and Sexual Activity    Alcohol use: No    Drug use: No    Sexual activity: Not on file         Indications of Abuse/Neglect: No    Feels Unsafe at Home or Work/School: no  Physical Signs of Abuse Present: no     Financial:  Payer/Plan Subscr  Sex Relation Sub. Ins. ID Effective Group Num   1. [employer] MANAGE* LISANDRA PICKETT M* 1950 Female Self [id number] 12/1/19 [id number]                                   [address] [address]      2. [employer]* LISANDRA PICKETT M* 1950 Female Self [id number]                                    PO          Other identified concerns/needs:  No needs or concerns identified     Plan:     Interventions/Referrals:   Discharge plan is SNF. Pt's first choice is [facility], second choice is [facility], third choice is [facility].      Patient engaged in treatment planning process.      providing psychosocial and supportive counseling, resources, education, assistance and discharge planning as appropriate.  Patient state understanding of  available resources,  following, remains  available.

## 2022-05-06 NOTE — SUBJECTIVE & OBJECTIVE
Subjective:     Interval History: Pt afebrile, BP improving following discontinuation of nifedipine. O2 sats remain low despite improved positioning, increased O2 supplementation, and incentive spirometry. Subjectively, pt denies SOB. Repeating CT Chest today for further evaluation. Continues to drink boosts/supplemental shakes. Continues to feel stronger/improved and without new complaints today. Awaiting SNF placement.     Objective:     Vital Signs (Most Recent):  Temp: 98.2 °F (36.8 °C) (05/06/22 0817)  Pulse: 79 (05/06/22 0817)  Resp: 18 (05/06/22 0817)  BP: (!) 122/58 (05/06/22 0817)  SpO2: (!) 90 % (05/06/22 0817)   Vital Signs (24h Range):  Temp:  [96 °F (35.6 °C)-98.7 °F (37.1 °C)] 98.2 °F (36.8 °C)  Pulse:  [67-79] 79  Resp:  [17-20] 18  SpO2:  [90 %-94 %] 90 %  BP: (117-133)/(56-61) 122/58     Weight: 70.5 kg (155 lb 6.8 oz)  Body mass index is 25.86 kg/m².  Body surface area is 1.8 meters squared.    ECOG SCORE             Intake/Output - Last 3 Shifts         05/04 0700  05/05 0659 05/05 0700  05/06 0659 05/06 0700  05/07 0659    P.O. 2040 1800 598    I.V. (mL/kg) 1970.1 (27.7)      IV Piggyback       Total Intake(mL/kg) 4010.1 (56.5) 1800 (25.5) 598 (8.5)    Urine (mL/kg/hr) 2050 (1.2) 1300 (0.8)     Total Output 2050 1300     Net +1960.1 +500 +598           Stool Occurrence   1 x            Physical Exam  Vitals and nursing note reviewed.   Constitutional:       Appearance: She is well-developed.   HENT:      Head: Normocephalic and atraumatic.      Mouth/Throat:      Mouth: Mucous membranes are moist.      Pharynx: No oropharyngeal exudate.   Eyes:      Conjunctiva/sclera: Conjunctivae normal.      Pupils: Pupils are equal, round, and reactive to light.   Cardiovascular:      Rate and Rhythm: Normal rate and regular rhythm.      Heart sounds: Normal heart sounds. No murmur heard.  Pulmonary:      Effort: Pulmonary effort is normal.      Breath sounds: Normal breath sounds. No wheezing or rales.       Comments: Diminished lung sounds  Abdominal:      General: Bowel sounds are normal. There is no distension.      Palpations: Abdomen is soft.      Tenderness: There is no abdominal tenderness.   Musculoskeletal:         General: No deformity. Normal range of motion.      Cervical back: Normal range of motion and neck supple.      Comments: Redness to bilat great toes.   Skin:     General: Skin is warm and dry.      Findings: No erythema or rash.      Comments: Right chest wall port. Dressing c/d/i. No sign of infection to site.   Neurological:      Mental Status: She is alert and oriented to person, place, and time.      Cranial Nerves: No cranial nerve deficit.      Motor: Weakness (generalized weakness) present.   Psychiatric:         Behavior: Behavior normal.         Thought Content: Thought content normal.         Judgment: Judgment normal.       Significant Labs:   CBC:   Recent Labs   Lab 05/05/22  0514 05/06/22  0509   WBC 6.55 7.65   HGB 7.2* 6.8*   HCT 22.6* 21.8*    144*    and CMP:   Recent Labs   Lab 05/05/22  0514 05/06/22  0509   * 130*   K 3.7 4.0   CL 94* 96   CO2 29 28   * 102   BUN 19 16   CREATININE 0.6 0.6   CALCIUM 8.1* 8.8   PROT 5.1* 5.6*   ALBUMIN 1.9* 1.8*   BILITOT 0.4 0.4   ALKPHOS 85 78   AST 39 35   ALT 39 39   ANIONGAP 7* 6*   EGFRNONAA >60.0 >60.0       Diagnostic Results:  None

## 2022-05-06 NOTE — ASSESSMENT & PLAN NOTE
Patient admitted with acute hypoxic respiratory failure, she was treated for PNA during recent admission and was initiated on home oxygen after 6 min walk test previous admission.  - CXR with pulmonary consolidation and fever on admission, recently completed levaquin/azithro course for CAP.   - Started on Cefepime on admission, further infectious work up unrevealing and afebrile >48 hours, will discontinued Cefepime 5/3   - O2 saturations decreased this AM with increasing O2 support, Now low 90% saturations despite 3L O2.   - Repeating CT Chest today

## 2022-05-06 NOTE — PLAN OF CARE
Plan of care reviewed with patient .  FAll precautions maintained, side rails up x2, call light in reach , bed in low position and locked, nonskid socks on.  Patient received 1 unit of blood at this time.  Physical therapy came and worked with her today.  Voiding with purewic.  Encouraged to eat and drink.  No complaints voiced.

## 2022-05-07 PROBLEM — R50.9 FEVER: Status: RESOLVED | Noted: 2022-01-01 | Resolved: 2022-01-01

## 2022-05-07 PROBLEM — R63.0 POOR APPETITE: Status: RESOLVED | Noted: 2022-01-01 | Resolved: 2022-01-01

## 2022-05-07 NOTE — PROGRESS NOTES
MD notified of patient's increased O2 demand. 89% on 5L NC. ABG and respiratory treatments ordered. Will continue to monitor.

## 2022-05-07 NOTE — SUBJECTIVE & OBJECTIVE
Subjective:     Interval History: stable, oxygen requirement improving. CT chest showed increased consolidation in right middle lobe. Will need repeat CT chest as outpatient to rule out malignancy.     Objective:     Vital Signs (Most Recent):  Temp: 98.6 °F (37 °C) (05/07/22 0731)  Pulse: 84 (05/07/22 0731)  Resp: 18 (05/07/22 0731)  BP: (!) 147/69 (05/07/22 0731)  SpO2: (!) 90 % (05/07/22 0731)   Vital Signs (24h Range):  Temp:  [97.4 °F (36.3 °C)-98.7 °F (37.1 °C)] 98.6 °F (37 °C)  Pulse:  [67-84] 84  Resp:  [18-20] 18  SpO2:  [90 %-94 %] 90 %  BP: (117-147)/(58-69) 147/69     Weight: 70.5 kg (155 lb 6.8 oz)  Body mass index is 25.86 kg/m².  Body surface area is 1.8 meters squared.    ECOG SCORE             Intake/Output - Last 3 Shifts         05/05 0700  05/06 0659 05/06 0700  05/07 0659 05/07 0700  05/08 0659    P.O. 1800 1198 150    I.V. (mL/kg)       Blood  416     Total Intake(mL/kg) 1800 (25.5) 1614 (22.9) 150 (2.1)    Urine (mL/kg/hr) 1300 (0.8) 1350 (0.8) 1000 (3.1)    Stool  0     Total Output 1300 1350 1000    Net +500 +264 -850           Urine Occurrence  1 x     Stool Occurrence  3 x             Physical Exam  Vitals and nursing note reviewed.   Constitutional:       Appearance: She is well-developed.   HENT:      Head: Normocephalic and atraumatic.      Mouth/Throat:      Mouth: Mucous membranes are moist.      Pharynx: No oropharyngeal exudate.   Eyes:      Conjunctiva/sclera: Conjunctivae normal.      Pupils: Pupils are equal, round, and reactive to light.   Cardiovascular:      Rate and Rhythm: Normal rate and regular rhythm.      Heart sounds: Normal heart sounds. No murmur heard.  Pulmonary:      Effort: Pulmonary effort is normal.      Breath sounds: Normal breath sounds. No wheezing or rales.      Comments: Diminished lung sounds  Abdominal:      General: Bowel sounds are normal. There is no distension.      Palpations: Abdomen is soft.      Tenderness: There is no abdominal tenderness.    Musculoskeletal:         General: No deformity. Normal range of motion.      Cervical back: Normal range of motion and neck supple.      Comments: Redness to bilat great toes.   Skin:     General: Skin is warm and dry.      Findings: No erythema or rash.      Comments: Right chest wall port. Dressing c/d/i. No sign of infection to site.   Neurological:      Mental Status: She is alert and oriented to person, place, and time.      Cranial Nerves: No cranial nerve deficit.      Motor: Weakness (generalized weakness) present.   Psychiatric:         Behavior: Behavior normal.         Thought Content: Thought content normal.         Judgment: Judgment normal.       Significant Labs:   CBC:   Recent Labs   Lab 05/06/22  0509 05/07/22  0412   WBC 7.65 9.29   HGB 6.8* 8.2*   HCT 21.8* 25.4*   * 135*      and CMP:   Recent Labs   Lab 05/06/22  0509 05/07/22 0412   * 134*   K 4.0 4.0   CL 96 97   CO2 28 29    108   BUN 16 15   CREATININE 0.6 0.6   CALCIUM 8.8 8.5*   PROT 5.6* 5.4*   ALBUMIN 1.8* 2.0*   BILITOT 0.4 0.6   ALKPHOS 78 91   AST 35 47*   ALT 39 52*   ANIONGAP 6* 8   EGFRNONAA >60.0 >60.0         Diagnostic Results:  None

## 2022-05-07 NOTE — NURSING
6704 port and pathak needle changed used sterile technique. flushed well, no blood return, second RN tried, still no blood returned with both, supervisor advice to obtain catflo Activase order from MD.

## 2022-05-07 NOTE — PLAN OF CARE
Pt stated that pain in feet and knees has decreased today. MD notified of increased oxygen demand. Respiratory treatment ordered. Pt sat 90% on 4L NC. ACHS, no coverage needed. Diabetic diet, appetite poor. No other complaints this shift. Pt awaiting SNF placement. POC reviewed with patient; understanding verbalized. Pt. with nonskid footwear on, bed in lowest position, and locked with bed rails up x2.  Pt turned throughout shift. Pt. has call light and personal items within reach. Afebrile this shift. All questions and concerns addressed at this time. Will continue to monitor.

## 2022-05-07 NOTE — ASSESSMENT & PLAN NOTE
Patient admitted with acute hypoxic respiratory failure, she was treated for PNA during recent admission and was initiated on home oxygen after 6 min walk test previous admission.  - CXR with pulmonary consolidation and fever on admission, recently completed levaquin/azithro course for CAP.   - Started on Cefepime on admission, further infectious work up unrevealing and afebrile >48 hours, will discontinued Cefepime 5/3   - 5/6 patient's had increasing oxygen requirement. CT chest showed increased consolidation in middle lobe. No concern for pneumonia. Improving today 5/7. Will continue to monitor. Needs repeat CT chest as outpatient to rule out malignancy.

## 2022-05-07 NOTE — PROGRESS NOTES
[facility] ([facility])  Hematology  Bone Marrow Transplant  Progress Note    Patient Name: Susan Puente  Admission Date: 4/29/2022  Hospital Length of Stay: 5 days  Code Status: Full Code    Subjective:     Interval History: stable, oxygen requirement improving. CT chest showed increased consolidation in right middle lobe. Will need repeat CT chest as outpatient to rule out malignancy.     Objective:     Vital Signs (Most Recent):  Temp: 98.6 °F (37 °C) (05/07/22 0731)  Pulse: 84 (05/07/22 0731)  Resp: 18 (05/07/22 0731)  BP: (!) 147/69 (05/07/22 0731)  SpO2: (!) 90 % (05/07/22 0731)   Vital Signs (24h Range):  Temp:  [97.4 °F (36.3 °C)-98.7 °F (37.1 °C)] 98.6 °F (37 °C)  Pulse:  [67-84] 84  Resp:  [18-20] 18  SpO2:  [90 %-94 %] 90 %  BP: (117-147)/(58-69) 147/69     Weight: 70.5 kg (155 lb 6.8 oz)  Body mass index is 25.86 kg/m².  Body surface area is 1.8 meters squared.    ECOG SCORE             Intake/Output - Last 3 Shifts         05/05 0700  05/06 0659 05/06 0700  05/07 0659 05/07 0700  05/08 0659    P.O. 1800 1198 150    I.V. (mL/kg)       Blood  416     Total Intake(mL/kg) 1800 (25.5) 1614 (22.9) 150 (2.1)    Urine (mL/kg/hr) 1300 (0.8) 1350 (0.8) 1000 (3.1)    Stool  0     Total Output 1300 1350 1000    Net +500 +264 -850           Urine Occurrence  1 x     Stool Occurrence  3 x             Physical Exam  Vitals and nursing note reviewed.   Constitutional:       Appearance: She is well-developed.   HENT:      Head: Normocephalic and atraumatic.      Mouth/Throat:      Mouth: Mucous membranes are moist.      Pharynx: No oropharyngeal exudate.   Eyes:      Conjunctiva/sclera: Conjunctivae normal.      Pupils: Pupils are equal, round, and reactive to light.   Cardiovascular:      Rate and Rhythm: Normal rate and regular rhythm.      Heart sounds: Normal heart sounds. No murmur heard.  Pulmonary:      Effort: Pulmonary effort is normal.      Breath sounds: Normal breath sounds. No wheezing or  rales.      Comments: Diminished lung sounds  Abdominal:      General: Bowel sounds are normal. There is no distension.      Palpations: Abdomen is soft.      Tenderness: There is no abdominal tenderness.   Musculoskeletal:         General: No deformity. Normal range of motion.      Cervical back: Normal range of motion and neck supple.      Comments: Redness to bilat great toes.   Skin:     General: Skin is warm and dry.      Findings: No erythema or rash.      Comments: Right chest wall port. Dressing c/d/i. No sign of infection to site.   Neurological:      Mental Status: She is alert and oriented to person, place, and time.      Cranial Nerves: No cranial nerve deficit.      Motor: Weakness (generalized weakness) present.   Psychiatric:         Behavior: Behavior normal.         Thought Content: Thought content normal.         Judgment: Judgment normal.       Significant Labs:   CBC:   Recent Labs   Lab 05/06/22  0509 05/07/22 0412   WBC 7.65 9.29   HGB 6.8* 8.2*   HCT 21.8* 25.4*   * 135*      and CMP:   Recent Labs   Lab 05/06/22 0509 05/07/22 0412   * 134*   K 4.0 4.0   CL 96 97   CO2 28 29    108   BUN 16 15   CREATININE 0.6 0.6   CALCIUM 8.8 8.5*   PROT 5.6* 5.4*   ALBUMIN 1.8* 2.0*   BILITOT 0.4 0.6   ALKPHOS 78 91   AST 35 47*   ALT 39 52*   ANIONGAP 6* 8   EGFRNONAA >60.0 >60.0         Diagnostic Results:  None    Assessment/Plan:     * Acute gout of foot  - patient reports difficulty bearing weight on bilat feet due to pain, improving  - x-ray showing soft tissue edema suggestive of gout flare  - uric acid wnl  - allopurinol 100 mg daily for prevention  - cooling muscle cream PRN for additional pain support     Hyponatremia  - Suspect insetting of hypovolemia  - Mild, Na 130 today. Improved on IVF    Physical debility  - reports difficulty with ambulation due to pain and weakness  - PT/OT consulted. Recommending SNF.  - Adding Boost (glucose control) with meals for nutritional  support    Pancytopenia due to antineoplastic chemotherapy  - Transfuse for hgb < 7 or plts < 10K  - Daily cbc while inpatient  - Continue acyclovir and fluconazole ppx  - Cipro ppx resumed 5/4    Acute respiratory failure with hypoxia  Patient admitted with acute hypoxic respiratory failure, she was treated for PNA during recent admission and was initiated on home oxygen after 6 min walk test previous admission.  - CXR with pulmonary consolidation and fever on admission, recently completed levaquin/azithro course for CAP.   - Started on Cefepime on admission, further infectious work up unrevealing and afebrile >48 hours, will discontinued Cefepime 5/3   - 5/6 patient's had increasing oxygen requirement. CT chest showed increased consolidation in middle lobe. No concern for pneumonia. Improving today 5/7. Will continue to monitor. Needs repeat CT chest as outpatient to rule out malignancy.     CAP (community acquired pneumonia)  - Pt with 2 weeks of productive cough, nasal congestion  - Upon arrival to the hospial she was hypoxic requiring 3L NC, weaned to 2L today  - CXR with likely develop consolidation  - CTA shows New patchy consolidative opacities in the in the right upper and posterior right lower lobes.  Findings suggestive infectious etiology such as pneumonia, multifocal pneumonia.  Also consider atypical given patient's reported immunocompromised status.  Aspiration could present similarly.  Consider continued follow-up after acute clinical illness has resolved to ensure resolution. New mediastinal and bilateral hilar lymphadenopathy, likely reactive.  - completed azithromycin and 7 day course of Levaquin on 4/30.  - CXR on admission showing possible developing consolidation: Cefepime 4/30-5/3       Controlled type 2 diabetes mellitus without complication, without long-term current use of insulin  - Will hold home po diabetic medications  - SSI, ACHS blood glucose monitoring, and diabetic diet  - Goal bg  140-180    Hypokalemia  - K+ 4.0 today  - replacing per prn order set  - daily CMP while inpatient    Insomnia  - Continue nightly melatonin  - Adding remeron 5/5    MDS (myelodysplastic syndrome)  - Primary oncologist, Dr. 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 Developed allergy and was then desensitized. Soon after developed pancytopenia and Revlimid was stopped June 2017.    - BMBX on 6/8/17 was with normal cytogenetics. 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 Of 20 metaphases, 5 were normal and 15 had a 5q deletion, NGS positive for SF3B1 and TP53 (12%). 1.4% blasts  - Received vidaza for 22 cycles, received Decitabine for C23 due to national shortage of vidaza and then back to vidaza with C24.  - completed cycle 39 on 4/8/22  - due for cycle 40 on 05/02/2022, postponed whiled admitted and to be readdressed as outpatient     Adjustment disorder with mixed anxiety and depressed mood  - continue home celexa    Essential hypertension  - At home regimen: lisinopril/hctz and coreg  - SBP 170s-180s. Increased Coreg dose to 25mg with minimal improveemt  - Discussed with cardiology who recommended switching HCTZ to Chlorthalidone 25mg daily and adding Nifedipine XL 60mg with outpatient follow up. This AM BP slightly soft (115s/50s), will adjust to Nifedipine XL 30mg to allow for some permissible HTN in this anemic/deconditioned pt  - Current anti-HTN regimen: Lisinopril 40mg daily, Coreg 25mg BID,  Chlorthalidone 25mg daily. Stopping nifedipine today d/t hypotension.   - Will need cardiology f/u outpatient    CAD (coronary artery disease)  - S/P left carotid endarterectomy prior to cancer dx, s/p PCI (3 stents) >20 years ago   - Continue on ASA 81mg daily, platelets are wnl  - On repatha as outpatient (statin intolerant)        VTE Risk Mitigation (From admission, onward)         Ordered     enoxaparin injection 40 mg  Daily         04/29/22 1651     IP VTE HIGH RISK PATIENT  Once         04/29/22 1651     Place sequential compression device  Until discontinued         04/29/22 1651                  Cruz Nunn MD  Bone Marrow Transplant  [facility] ([facility])

## 2022-05-07 NOTE — ASSESSMENT & PLAN NOTE
- patient reports difficulty bearing weight on bilat feet due to pain, improving  - x-ray showing soft tissue edema suggestive of gout flare  - uric acid wnl  - allopurinol 100 mg daily for prevention  - cooling muscle cream PRN for additional pain support

## 2022-05-08 NOTE — SUBJECTIVE & OBJECTIVE
Subjective:     Interval History: overnight oxygen requirement increased to 5 L. Started on duo nebs. Oxygen is down to 3.5 L this morning. CTA chest is pending. Will continue with duo nebs scheduled today.     She feels well overall. Denies dyspnea, chest pain , palpitations, dizziness. States that duo nebs are helping.     Complained of right hand pain and swelling in her right hand joints. Will get Xray.     Objective:     Vital Signs (Most Recent):  Temp: 98.5 °F (36.9 °C) (05/08/22 0720)  Pulse: 79 (05/08/22 0734)  Resp: 16 (05/08/22 0734)  BP: (!) 156/67 (05/08/22 0720)  SpO2: (!) 92 % (05/08/22 0734)   Vital Signs (24h Range):  Temp:  [97.7 °F (36.5 °C)-99.1 °F (37.3 °C)] 98.5 °F (36.9 °C)  Pulse:  [] 79  Resp:  [14-20] 16  SpO2:  [86 %-95 %] 92 %  BP: (130-165)/(63-70) 156/67     Weight: 70 kg (154 lb 5.2 oz)  Body mass index is 25.68 kg/m².  Body surface area is 1.79 meters squared.    ECOG SCORE             Intake/Output - Last 3 Shifts         05/06 0700  05/07 0659 05/07 0700  05/08 0659 05/08 0700  05/09 0659    P.O. 1198 607     Blood 416      Total Intake(mL/kg) 1614 (22.9) 607 (8.7)     Urine (mL/kg/hr) 1350 (0.8) 1700 (1) 500 (1.9)    Stool 0      Total Output 1350 1700 500    Net +264 -1093 -500           Urine Occurrence 1 x 500 x     Stool Occurrence 3 x              Physical Exam  Vitals and nursing note reviewed.   Constitutional:       Appearance: She is well-developed.   HENT:      Head: Normocephalic and atraumatic.      Mouth/Throat:      Mouth: Mucous membranes are moist.      Pharynx: No oropharyngeal exudate.   Eyes:      Conjunctiva/sclera: Conjunctivae normal.      Pupils: Pupils are equal, round, and reactive to light.   Cardiovascular:      Rate and Rhythm: Normal rate and regular rhythm.      Heart sounds: Normal heart sounds. No murmur heard.  Pulmonary:      Effort: Pulmonary effort is normal.      Breath sounds: Normal breath sounds. No wheezing or rales.      Comments:  Diminished lung sounds  Abdominal:      General: Bowel sounds are normal. There is no distension.      Palpations: Abdomen is soft.      Tenderness: There is no abdominal tenderness.   Musculoskeletal:         General: No deformity. Normal range of motion.      Cervical back: Normal range of motion and neck supple.      Comments: Redness to bilat great toes.   Skin:     General: Skin is warm and dry.      Findings: No erythema or rash.      Comments: Right chest wall port. Dressing c/d/i. No sign of infection to site.   Neurological:      Mental Status: She is alert and oriented to person, place, and time.      Cranial Nerves: No cranial nerve deficit.      Motor: Weakness (generalized weakness) present.   Psychiatric:         Behavior: Behavior normal.         Thought Content: Thought content normal.         Judgment: Judgment normal.       Significant Labs:   CBC:   Recent Labs   Lab 05/07/22  0412 05/08/22  0402   WBC 9.29 7.40   HGB 8.2* 7.6*   HCT 25.4* 23.8*   * 113*      and CMP:   Recent Labs   Lab 05/07/22 0412 05/08/22  0402   * 135*   K 4.0 3.9   CL 97 95   CO2 29 32*    85   BUN 15 15   CREATININE 0.6 0.6   CALCIUM 8.5* 8.6*   PROT 5.4* 5.2*   ALBUMIN 2.0* 1.9*   BILITOT 0.6 0.6   ALKPHOS 91 91   AST 47* 50*   ALT 52* 57*   ANIONGAP 8 8   EGFRNONAA >60.0 >60.0         Diagnostic Results:  None

## 2022-05-08 NOTE — CARE UPDATE
RAPID RESPONSE NURSE ROUND       Rounding completed with charge RN, Isamar. No concerns verbalized at this time. Instructed to call [phone] for further concerns or assistance.

## 2022-05-08 NOTE — PLAN OF CARE
CTA with contrast completed this shift. C/o hand swelling and pain this shift, hand xray completed. 4L NC, sats 89%. Diabetic diet, appetite poor. No BM this shift. Pt voids via purwick. IV abx started this shift. POC reviewed with patient; understanding verbalized. Pt. with nonskid footwear on, bed in lowest position, and locked with bed rails up x2.  Pt. turned q2. Pt. has call light and personal items within reach. Patient ambulates in room independently. VSS and afebrile this shift. All questions and concerns addressed at this time. Will continue to monitor.

## 2022-05-08 NOTE — PLAN OF CARE
Patient resting in bed: side rails up x 2, lowest position, bed ,locked and call bell in place; 3.5 L via nasal cannula. Plan of care was reviewed with patient. She is tolerating PO intake but does not have an appetite. She is not able to ambulate in room but turn in bed; pure wick in place. Afebrile and vitals remain stable. Labs obtained, results reviewed. Frequent rounding completed and patient encouraged to call as needed. Will continue to monitor patient.     Problem: Adult Inpatient Plan of Care  Goal: Plan of Care Review  Outcome: Ongoing, Progressing  Goal: Patient-Specific Goal (Individualized)  Outcome: Ongoing, Progressing  Goal: Absence of Hospital-Acquired Illness or Injury  Outcome: Ongoing, Progressing  Goal: Optimal Comfort and Wellbeing  Outcome: Ongoing, Progressing  Goal: Readiness for Transition of Care  Outcome: Ongoing, Progressing     Problem: Diabetes Comorbidity  Goal: Blood Glucose Level Within Targeted Range  Outcome: Ongoing, Progressing     Problem: Adjustment to Illness (Sepsis/Septic Shock)  Goal: Optimal Coping  Outcome: Ongoing, Progressing     Problem: Bleeding (Sepsis/Septic Shock)  Goal: Absence of Bleeding  Outcome: Ongoing, Progressing     Problem: Glycemic Control Impaired (Sepsis/Septic Shock)  Goal: Blood Glucose Level Within Desired Range  Outcome: Ongoing, Progressing     Problem: Infection Progression (Sepsis/Septic Shock)  Goal: Absence of Infection Signs and Symptoms  Outcome: Ongoing, Progressing     Problem: Nutrition Impaired (Sepsis/Septic Shock)  Goal: Optimal Nutrition Intake  Outcome: Ongoing, Progressing     Problem: Fluid and Electrolyte Imbalance (Acute Kidney Injury/Impairment)  Goal: Fluid and Electrolyte Balance  Outcome: Ongoing, Progressing     Problem: Oral Intake Inadequate (Acute Kidney Injury/Impairment)  Goal: Optimal Nutrition Intake  Outcome: Ongoing, Progressing     Problem: Renal Function Impairment (Acute Kidney Injury/Impairment)  Goal:  Effective Renal Function  Outcome: Ongoing, Progressing     Problem: Fluid Imbalance (Pneumonia)  Goal: Fluid Balance  Outcome: Ongoing, Progressing     Problem: Infection (Pneumonia)  Goal: Resolution of Infection Signs and Symptoms  Outcome: Ongoing, Progressing     Problem: Respiratory Compromise (Pneumonia)  Goal: Effective Oxygenation and Ventilation  Outcome: Ongoing, Progressing     Problem: Infection  Goal: Absence of Infection Signs and Symptoms  Outcome: Ongoing, Progressing     Problem: Skin Injury Risk Increased  Goal: Skin Health and Integrity  Outcome: Ongoing, Progressing     Problem: Impaired Wound Healing  Goal: Optimal Wound Healing  Outcome: Ongoing, Progressing     Problem: Fall Injury Risk  Goal: Absence of Fall and Fall-Related Injury  Outcome: Ongoing, Progressing

## 2022-05-08 NOTE — ASSESSMENT & PLAN NOTE
Patient admitted with acute hypoxic respiratory failure, she was treated for PNA during recent admission and was initiated on home oxygen after 6 min walk test previous admission.  - CXR with pulmonary consolidation and fever on admission, recently completed levaquin/azithro course for CAP.   - Started on Cefepime on admission, further infectious work up unrevealing and afebrile >48 hours, will discontinued Cefepime 5/3   - 5/6 patient's had increasing oxygen requirement. CT chest showed increased consolidation in middle lobe. No concern for pneumonia. Improving today 5/7. Will continue to monitor. Needs repeat CT chest as outpatient to rule out malignancy.     -5/7 increased oxygen requirement. ABG showed hypoxemia. Will get CTA chest, Duo nebs started.

## 2022-05-08 NOTE — PROGRESS NOTES
[facility]ova - Oncology ([facility])  Hematology  Bone Marrow Transplant  Progress Note    Patient Name: Susan Puente  Admission Date: 4/29/2022  Hospital Length of Stay: 6 days  Code Status: Full Code    Subjective:     Interval History: overnight oxygen requirement increased to 5 L. Started on duo nebs. Oxygen is down to 3.5 L this morning. CTA chest is pending. Will continue with duo nebs scheduled today.     She feels well overall. Denies dyspnea, chest pain , palpitations, dizziness. States that duo nebs are helping.     Complained of right hand pain and swelling in her right hand joints. Will get Xray.     Objective:     Vital Signs (Most Recent):  Temp: 98.5 °F (36.9 °C) (05/08/22 0720)  Pulse: 79 (05/08/22 0734)  Resp: 16 (05/08/22 0734)  BP: (!) 156/67 (05/08/22 0720)  SpO2: (!) 92 % (05/08/22 0734)   Vital Signs (24h Range):  Temp:  [97.7 °F (36.5 °C)-99.1 °F (37.3 °C)] 98.5 °F (36.9 °C)  Pulse:  [] 79  Resp:  [14-20] 16  SpO2:  [86 %-95 %] 92 %  BP: (130-165)/(63-70) 156/67     Weight: 70 kg (154 lb 5.2 oz)  Body mass index is 25.68 kg/m².  Body surface area is 1.79 meters squared.    ECOG SCORE             Intake/Output - Last 3 Shifts         05/06 0700 05/07 0659 05/07 0700 05/08 0659 05/08 0700 05/09 0659    P.O. 1198 607     Blood 416      Total Intake(mL/kg) 1614 (22.9) 607 (8.7)     Urine (mL/kg/hr) 1350 (0.8) 1700 (1) 500 (1.9)    Stool 0      Total Output 1350 1700 500    Net +264 -1093 -500           Urine Occurrence 1 x 500 x     Stool Occurrence 3 x              Physical Exam  Vitals and nursing note reviewed.   Constitutional:       Appearance: She is well-developed.   HENT:      Head: Normocephalic and atraumatic.      Mouth/Throat:      Mouth: Mucous membranes are moist.      Pharynx: No oropharyngeal exudate.   Eyes:      Conjunctiva/sclera: Conjunctivae normal.      Pupils: Pupils are equal, round, and reactive to light.   Cardiovascular:      Rate and Rhythm: Normal rate and  regular rhythm.      Heart sounds: Normal heart sounds. No murmur heard.  Pulmonary:      Effort: Pulmonary effort is normal.      Breath sounds: Normal breath sounds. No wheezing or rales.      Comments: Diminished lung sounds  Abdominal:      General: Bowel sounds are normal. There is no distension.      Palpations: Abdomen is soft.      Tenderness: There is no abdominal tenderness.   Musculoskeletal:         General: No deformity. Normal range of motion.      Cervical back: Normal range of motion and neck supple.      Comments: Redness to bilat great toes.   Skin:     General: Skin is warm and dry.      Findings: No erythema or rash.      Comments: Right chest wall port. Dressing c/d/i. No sign of infection to site.   Neurological:      Mental Status: She is alert and oriented to person, place, and time.      Cranial Nerves: No cranial nerve deficit.      Motor: Weakness (generalized weakness) present.   Psychiatric:         Behavior: Behavior normal.         Thought Content: Thought content normal.         Judgment: Judgment normal.       Significant Labs:   CBC:   Recent Labs   Lab 05/07/22  0412 05/08/22  0402   WBC 9.29 7.40   HGB 8.2* 7.6*   HCT 25.4* 23.8*   * 113*      and CMP:   Recent Labs   Lab 05/07/22  0412 05/08/22  0402   * 135*   K 4.0 3.9   CL 97 95   CO2 29 32*    85   BUN 15 15   CREATININE 0.6 0.6   CALCIUM 8.5* 8.6*   PROT 5.4* 5.2*   ALBUMIN 2.0* 1.9*   BILITOT 0.6 0.6   ALKPHOS 91 91   AST 47* 50*   ALT 52* 57*   ANIONGAP 8 8   EGFRNONAA >60.0 >60.0         Diagnostic Results:  None    Assessment/Plan:     * Acute gout of foot  - patient reports difficulty bearing weight on bilat feet due to pain, improving  - x-ray showing soft tissue edema suggestive of gout flare  - uric acid wnl  - allopurinol 100 mg daily for prevention  - cooling muscle cream PRN for additional pain support     Hyponatremia  - Suspect insetting of hypovolemia  - Mild, Na 130 today. Improved on  IVF    Physical debility  - reports difficulty with ambulation due to pain and weakness  - PT/OT consulted. Recommending SNF.  - Adding Boost (glucose control) with meals for nutritional support    Pancytopenia due to antineoplastic chemotherapy  - Transfuse for hgb < 7 or plts < 10K  - Daily cbc while inpatient  - Continue acyclovir and fluconazole ppx  - Cipro ppx resumed 5/4    Acute respiratory failure with hypoxia  Patient admitted with acute hypoxic respiratory failure, she was treated for PNA during recent admission and was initiated on home oxygen after 6 min walk test previous admission.  - CXR with pulmonary consolidation and fever on admission, recently completed levaquin/azithro course for CAP.   - Started on Cefepime on admission, further infectious work up unrevealing and afebrile >48 hours, will discontinued Cefepime 5/3   - 5/6 patient's had increasing oxygen requirement. CT chest showed increased consolidation in middle lobe. No concern for pneumonia. Improving today 5/7. Will continue to monitor. Needs repeat CT chest as outpatient to rule out malignancy.     -5/7 increased oxygen requirement. ABG showed hypoxemia. Will get CTA chest, Duo nebs started.     CAP (community acquired pneumonia)  - Pt with 2 weeks of productive cough, nasal congestion  - Upon arrival to the hospial she was hypoxic requiring 3L NC, weaned to 2L today  - CXR with likely develop consolidation  - CTA shows New patchy consolidative opacities in the in the right upper and posterior right lower lobes.  Findings suggestive infectious etiology such as pneumonia, multifocal pneumonia.  Also consider atypical given patient's reported immunocompromised status.  Aspiration could present similarly.  Consider continued follow-up after acute clinical illness has resolved to ensure resolution. New mediastinal and bilateral hilar lymphadenopathy, likely reactive.  - completed azithromycin and 7 day course of Levaquin on 4/30.  - CXR on  admission showing possible developing consolidation: Cefepime 4/30-5/3       Controlled type 2 diabetes mellitus without complication, without long-term current use of insulin  - Will hold home po diabetic medications  - SSI, ACHS blood glucose monitoring, and diabetic diet  - Goal bg 140-180    Hypokalemia  - K+ 4.0 today  - replacing per prn order set  - daily CMP while inpatient    Insomnia  - Continue nightly melatonin  - Adding remeron 5/5    MDS (myelodysplastic syndrome)  - Primary oncologist, Dr. 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 Developed allergy and was then desensitized. Soon after developed pancytopenia and Revlimid was stopped June 2017.    - BMBX on 6/8/17 was with normal cytogenetics. 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 Of 20 metaphases, 5 were normal and 15 had a 5q deletion, NGS positive for SF3B1 and TP53 (12%). 1.4% blasts  - Received vidaza for 22 cycles, received Decitabine for C23 due to national shortage of vidaza and then back to vidaza with C24.  - completed cycle 39 on 4/8/22  - due for cycle 40 on 05/02/2022, postponed whiled admitted and to be readdressed as outpatient     Adjustment disorder with mixed anxiety and depressed mood  - continue home celexa    Essential hypertension  - At home regimen: lisinopril/hctz and coreg  - SBP 170s-180s. Increased Coreg dose to 25mg with minimal improveemt  - Discussed with cardiology who recommended switching HCTZ to  Chlorthalidone 25mg daily and adding Nifedipine XL 60mg with outpatient follow up. This AM BP slightly soft (115s/50s), will adjust to Nifedipine XL 30mg to allow for some permissible HTN in this anemic/deconditioned pt  - Current anti-HTN regimen: Lisinopril 40mg daily, Coreg 25mg BID, Chlorthalidone 25mg daily. Stopping nifedipine today d/t hypotension.   - Will need cardiology f/u outpatient    CAD (coronary artery disease)  - S/P left carotid endarterectomy prior to cancer dx, s/p PCI (3 stents) >20 years ago   - Continue on ASA 81mg daily, platelets are wnl  - On repatha as outpatient (statin intolerant)        VTE Risk Mitigation (From admission, onward)         Ordered     enoxaparin injection 40 mg  Daily         04/29/22 1651     IP VTE HIGH RISK PATIENT  Once         04/29/22 1651     Place sequential compression device  Until discontinued         04/29/22 1651                Cruz Nunn MD  Bone Marrow Transplant  [facility] r[facility]lan - Oncology ([facility])

## 2022-05-09 NOTE — HPI
Ms. Susan Puente is a 71 y.o F with MDS on treatment with Vidaza (currently receiving decitabine due to national Vidaza shortage), NIDDM2, HTN, CAD, anxiety, depression, and gout. She was recently admitted 4/23/22 to 4/27/22 with CAP. She completed a course of Azithromycin and was discharged with home health, oxygen, and a 7 day course of Levaquin. She re-presented to the ED on 4/29 complaining of foot pain making it difficult to ambulate at home. Redness noted to bilateral great toes and X-ray showing soft tissue edema suggestive of gout to right foot and bunion to left foot.     Pulmonology consulted for increased oxygen requirements and evaluation of worsening RLL consolidative process on CT s/p treatment for CAP.

## 2022-05-09 NOTE — MED STUDENT SUBJECTIVE & OBJECTIVE
"Medical Student Subjective & Objective     Principal Problem: Acute gout of foot    Chief Complaint:   Chief Complaint   Patient presents with    Bilateral foot pain      Was d/c'ed 3 days ago for pneumonia; pt's family called EMS bc "they can't take care of her bc they are leaving for vacation". Pt usually uses walker to ambulate, states "I can't walk bc my feet hurt".        HPI: Ms. Susan Puente is a 71 y.o F with MDS on treatment with Vidaza (currently receiving decitabine due to national Vidaza shortage), NIDDM2, HTN, CAD, anxiety, depression, and gout. She was recently admitted 4/23/22 to 4/27/22 with CAP. She completed a course of Azithromycin and was discharged with home health, oxygen, and a 7 day course of Levaquin. She re-presented to the ED on 4/29 complaining of foot pain making it difficult to ambulate at home. Redness noted to bilateral great toes and X-ray showing soft tissue edema suggestive of gout to right foot and bunion to left foot.     Pulmonology consulted for increased oxygen requirements (now on 4L NC) and evaluation of worsening RLL consolidative process on CT s/p treatment for CAP.      Hospital Course: No notes on file    Interval History:  5/6 continued to have O2 sats in the low 90s despite 3L NC and repositioning. CT Chest: Increased RLL Consolidation  5/7 CT-A: Negative for PE; ABG with hypoxemia   5/11 pt underwent bronchoscopy w/BAL. Waiting on cultures and cytology results    Past Medical History:   Diagnosis Date    Allergic rhinitis     Allergy     Anemia     Anxiety     CAD (coronary artery disease)     Carotid stenosis     Colon polyps 2016    Controlled type 2 diabetes mellitus without complication, without long-term current use of insulin 10/19/2016    Depression     GERD (gastroesophageal reflux disease)     Hearing loss in right ear     Hx of psychiatric care     Celexa, Prozac    Hypokalemia 2/3/2020    MDS (myelodysplastic syndrome) with 5q deletion     " Psychiatric problem     Therapy        Past Surgical History:   Procedure Laterality Date    BONE MARROW BIOPSY Left 6/7/2018    Procedure: Biopsy-bone marrow;  Surgeon: Gita Valdes MD;  [facility];  Service: Oncology;  Laterality: Left;    BONE MARROW BIOPSY Left 3/21/2019    Procedure: Biopsy-bone marrow;  Surgeon: Gita Valdes MD;  [facility];  Service: Oncology;  Laterality: Left;    BONE MARROW BIOPSY Left 10/24/2019    Procedure: Biopsy-bone marrow;  Surgeon: Gita Valdes MD;  [facility];  Service: Oncology;  Laterality: Left;  left iliac crest    BONE MARROW BIOPSY Left 4/21/2021    Procedure: Biopsy-bone marrow;  Surgeon: Gita Valdes MD;  Location: [facility] ENDO (4TH FLR);  Service: Oncology;  Laterality: Left;    BUNIONECTOMY      CAROTID ENDARTERECTOMY Left 2011    CAROTID STENT      COLONOSCOPY N/A 12/20/2016    Procedure: COLONOSCOPY;  Surgeon: PATRICIA Simpson MD;  Location: [facility] ENDO (4TH FLR);  Service: Endoscopy;  Laterality: N/A;  pt has vomiting with anesthesia in past    ENDOMETRIAL ABLATION      for endometriosis    INSERTION OF TUNNELED CENTRAL VENOUS CATHETER (CVC) WITH SUBCUTANEOUS PORT N/A 2/19/2020    Procedure: [id number];  Surgeon: Dosc Diagnostic Provider;  [facility];  Service: Radiology;  Laterality: N/A;  189    TONSILLECTOMY      TYMPANOSTOMY TUBE PLACEMENT         Review of patient's allergies indicates:   Allergen Reactions    Revlimid [lenalidomide] Swelling     Facial swelling  6/8/17 - in the process of desensitation       No current facility-administered medications on file prior to encounter.     Current Outpatient Medications on File Prior to Encounter   Medication Sig    acetaminophen (TYLENOL) 650 MG TbSR Take 1,300 mg by mouth daily as needed (Headache).    acyclovir (ZOVIRAX) 400 MG tablet Take 1 tablet (400 mg total) by mouth 2 (two) times daily.    aspirin (ECOTRIN) 81 MG EC tablet Take 1  tablet (81 mg total) by mouth once daily.    azacitidine (VIDAZA INJ) Inject as directed every 28 days.    carvediloL (COREG) 12.5 MG tablet Take 1 tablet (12.5 mg total) by mouth 2 (two) times daily with meals.    ciprofloxacin HCl (CIPRO) 500 MG tablet Take 1 tablet (500 mg total) by mouth 2 (two) times daily.    citalopram (CELEXA) 20 MG tablet Take 1 tablet (20 mg total) by mouth once daily.    evolocumab (REPATHA SURECLICK) 140 mg/mL PnIj Inject 140mg (1 pen) into the skin every 2 weeks. (Patient taking differently: Inject 140mg (1 pen) into the skin every 2 weeks.)    fluconazole (DIFLUCAN) 200 MG Tab Take 2 tablets (400 mg total) by mouth once daily.    ketotifen (ZADITOR) 0.025 % (0.035 %) ophthalmic solution Place 2-3 drops into both eyes daily as needed (Allergies).    lactose-reduced food (ENSURE ORAL) Take by mouth daily as needed (supplement).    lisinopriL-hydrochlorothiazide (PRINZIDE,ZESTORETIC) 20-12.5 mg per tablet Take 2 tablets by mouth once daily.    loperamide (IMODIUM) 2 mg capsule Take 4 mg by mouth daily as needed for Diarrhea.    loratadine (CLARITIN) 10 mg tablet Take 10 mg by mouth daily as needed.     magnesium oxide (MAGOX) 400 mg (241.3 mg magnesium) tablet Take 1 tablet (400 mg total) by mouth once daily.    melatonin (MELATIN) 5 mg Take 10 mg by mouth every evening.    MULTIVITAMIN ORAL Take 1 tablet by mouth once daily.    pantoprazole (PROTONIX) 40 MG tablet Take 1 tablet (40 mg total) by mouth once daily.    polyethylene glycol (MIRALAX) 17 gram PwPk Take 17 g by mouth daily as needed (Constipation).    potassium chloride SA (K-DUR,KLOR-CON) 20 MEQ tablet Take 1 tablet (20 mEq total) by mouth once daily.    deferasirox (EXJADE) 500 MG disintegrating tablet Take 3 tablets (1,500 mg total) by mouth before breakfast.    lancets 30 gauge Misc Use to test blood sugar daily (Patient taking differently: Use to test blood sugar daily)     Family History     Problem  Relation (Age of Onset)    Alcohol abuse Father, Brother    Cancer Paternal Grandmother    Heart disease Mother, Father    Hyperlipidemia Mother        Tobacco Use    Smoking status: Former Smoker     Packs/day: 1.50     Years: 50.00     Pack years: 75.00     Types: Cigarettes, Vaping with nicotine     Quit date: 3/3/2017     Years since quittin.1    Smokeless tobacco: Never Used   Substance and Sexual Activity    Alcohol use: No    Drug use: No    Sexual activity: Not on file     Review of Systems   Constitutional: Positive for activity change.   HENT: Negative.    Eyes: Negative.    Respiratory: Negative.    Cardiovascular: Negative.    Gastrointestinal: Negative.    Endocrine: Negative.    Genitourinary: Negative.    Musculoskeletal: Positive for arthralgias (BL feet and R hand).   Skin: Positive for color change.        Redness of bilateral great toes   Allergic/Immunologic: Positive for immunocompromised state.   Neurological: Negative.    Hematological: Negative.    Psychiatric/Behavioral: Negative.      Objective:     Vital Signs (Most Recent):  Temp: 98.3 °F (36.8 °C) (22 1212)  Pulse: 65 (22 1212)  Resp: 18 (22 1212)  BP: (!) 146/67 (22 0755)  SpO2: (!) 94 % (22 1212) Vital Signs (24h Range):  Temp:  [97.3 °F (36.3 °C)-98.3 °F (36.8 °C)] 98.3 °F (36.8 °C)  Pulse:  [61-88] 65  Resp:  [16-18] 18  SpO2:  [90 %-95 %] 94 %  BP: (105-149)/(58-72) 146/67     Weight: 69.8 kg (153 lb 14.1 oz)  Body mass index is 25.61 kg/m².    Intake/Output Summary (Last 24 hours) at 2022 1341  Last data filed at 2022 1242  Gross per 24 hour   Intake 1010.41 ml   Output 2150 ml   Net -1139.59 ml      Physical Exam  Constitutional:       Appearance: Normal appearance.   HENT:      Head: Normocephalic and atraumatic.      Mouth/Throat:      Mouth: Mucous membranes are moist.      Pharynx: Oropharynx is clear.   Eyes:      Extraocular Movements: Extraocular movements intact.       Conjunctiva/sclera: Conjunctivae normal.      Pupils: Pupils are equal, round, and reactive to light.   Cardiovascular:      Rate and Rhythm: Normal rate.      Pulses: Normal pulses.      Heart sounds: Normal heart sounds.   Pulmonary:      Effort: Pulmonary effort is normal.      Breath sounds: Rales (diffuse, bilaterally) present.   Abdominal:      General: Abdomen is flat. There is no distension.      Palpations: Abdomen is soft.      Tenderness: There is no abdominal tenderness. There is no guarding.   Musculoskeletal:         General: Tenderness (BL great toes) present.      Cervical back: Normal range of motion and neck supple.   Skin:     General: Skin is warm.      Capillary Refill: Capillary refill takes 2 to 3 seconds.      Comments: R chest wall port c/d/i   Neurological:      General: No focal deficit present.      Mental Status: She is alert and oriented to person, place, and time.         Significant Labs:   ABGs:   No results for input(s): PH, PCO2, HCO3, POCSATURATED, BE, TOTALHB, COHB, METHB, O2HB, POCFIO2, PO2 in the last 48 hours.  CBC:   Recent Labs   Lab 05/11/22  0503 05/12/22  0343   WBC 7.96 8.09   HGB 8.4* 7.5*   HCT 26.2* 23.1*   * 112*     CMP:   Recent Labs   Lab 05/11/22  0503 05/12/22  0343   * 130*   K 3.5 3.3*   CL 89* 90*   CO2 35* 32*   * 101   BUN 26* 26*   CREATININE 0.7 0.7   CALCIUM 9.1 8.5*   PROT 5.8* 5.5*   ALBUMIN 2.0* 1.9*   BILITOT 0.6 0.6   ALKPHOS 94 86   AST 59* 68*   ALT 87* 81*   ANIONGAP 10 8   EGFRNONAA >60.0 >60.0       Significant Imaging: I have reviewed all pertinent imaging results/findings within the past 24 hours.    Medical Student Subjective & Objective

## 2022-05-09 NOTE — PROGRESS NOTES
Brief Pulmonary Plan of Care -     Planned bronchoscopy with BAL +/- TBBX on Wednesday 05/11. Please make NPO Tuesday night and hold AC.     Sameer Saldana DO  [facility] Fellow  #[id number]

## 2022-05-09 NOTE — ASSESSMENT & PLAN NOTE
- reports difficulty with ambulation due to pain and weakness  - PT/OT consulted. Recommending SNF.  - Adding Boost with meals for nutritional support

## 2022-05-09 NOTE — SUBJECTIVE & OBJECTIVE
Subjective:     Interval History: Up in chair and feeling improved this AM. Afebrile. Continues to require 4L O2 with 93-94% saturations, pulm consulted today. CT-A Chest negative for PE. Continues duonebs. Cefepime started yesterday given concern for ongoing RML consolidation on CT and O2 requirements. Appetite slightly improving. Foot pain/wrist pain improving. No new complaints.      Objective:     Vital Signs (Most Recent):  Temp: 98.5 °F (36.9 °C) (05/09/22 0732)  Pulse: 76 (05/09/22 0754)  Resp: 20 (05/09/22 0754)  BP: (!) 163/71 (05/09/22 0732)  SpO2: (!) 92 % (05/09/22 0754)   Vital Signs (24h Range):  Temp:  [97.7 °F (36.5 °C)-99.5 °F (37.5 °C)] 98.5 °F (36.9 °C)  Pulse:  [70-82] 76  Resp:  [16-22] 20  SpO2:  [83 %-94 %] 92 %  BP: (133-165)/(60-72) 163/71     Weight: 68.8 kg (151 lb 10.8 oz)  Body mass index is 25.24 kg/m².  Body surface area is 1.78 meters squared.    ECOG SCORE           Intake/Output - Last 3 Shifts         05/07 0700  05/08 0659 05/08 0700  05/09 0659 05/09 0700  05/10 0659    P.O. 607 850     Blood       Total Intake(mL/kg) 607 (8.7) 850 (12.4)     Urine (mL/kg/hr) 1700 (1) 2875 (1.7)     Stool       Total Output 1700 2875     Net -1093 -2025            Urine Occurrence 500 x              Physical Exam  Vitals and nursing note reviewed.   Constitutional:       Appearance: She is well-developed.   HENT:      Head: Normocephalic and atraumatic.      Mouth/Throat:      Mouth: Mucous membranes are moist.      Pharynx: No oropharyngeal exudate.   Eyes:      Conjunctiva/sclera: Conjunctivae normal.      Pupils: Pupils are equal, round, and reactive to light.   Cardiovascular:      Rate and Rhythm: Normal rate and regular rhythm.      Heart sounds: Normal heart sounds. No murmur heard.  Pulmonary:      Effort: Pulmonary effort is normal.      Breath sounds: Normal breath sounds. No wheezing or rales.      Comments: Diminished lung sounds  Abdominal:      General: Bowel sounds are normal.  There is no distension.      Palpations: Abdomen is soft.      Tenderness: There is no abdominal tenderness.   Musculoskeletal:         General: No deformity. Normal range of motion.      Cervical back: Normal range of motion and neck supple.      Comments: Redness to bilat great toes.   Skin:     General: Skin is warm and dry.      Findings: No erythema or rash.      Comments: Right chest wall port. Dressing c/d/i. No sign of infection to site.   Neurological:      Mental Status: She is alert and oriented to person, place, and time.      Cranial Nerves: No cranial nerve deficit.      Motor: Weakness (generalized weakness) present.   Psychiatric:         Behavior: Behavior normal.         Thought Content: Thought content normal.         Judgment: Judgment normal.       Significant Labs:   CBC:   Recent Labs   Lab 05/08/22  0402 05/09/22  0358   WBC 7.40 7.12   HGB 7.6* 7.1*   HCT 23.8* 22.3*   * 114*    and CMP:   Recent Labs   Lab 05/08/22  0402 05/09/22  0358   * 134*   K 3.9 4.0   CL 95 92*   CO2 32* 34*   GLU 85 91   BUN 15 15   CREATININE 0.6 0.6   CALCIUM 8.6* 8.2*   PROT 5.2* 5.2*   ALBUMIN 1.9* 1.8*   BILITOT 0.6 0.6   ALKPHOS 91 87   AST 50* 59*   ALT 57* 65*   ANIONGAP 8 8   EGFRNONAA >60.0 >60.0       Diagnostic Results:  I have reviewed and interpreted all pertinent imaging results/findings within the past 24 hours.

## 2022-05-09 NOTE — ASSESSMENT & PLAN NOTE
- At home regimen: lisinopril/hctz and coreg  - SBP 170s-180s. Increased Coreg dose to 25mg with minimal improveemt  - Discussed with cardiology who recommended switching HCTZ to Chlorthalidone 25mg daily and adding Nifedipine XL 60mg with outpatient follow up. Nifedipine was held d/t hypotension and to allow for some permissible hypertension given clinical status  - Current anti-HTN regimen: Lisinopril 40mg daily, Coreg 25mg BID, Chlorthalidone 25mg daily. Nifedipine held d/t hypotension. BP largely stable, with occasional SBP 160s, continuing present regimen to allow fo  - Will need cardiology f/u outpatient

## 2022-05-09 NOTE — MEDICAL/APP STUDENT
"[facility]  Progress Note    Patient Name: Susan Puente  MRN: [id number]  Patient Class: IP- Inpatient   Admission Date: 4/29/2022  Length of Stay: 7 days  Attending Physician: Jie Chinchilla MD  Primary Care Provider: Claudia Rapp MD    Subjective:     Principal Problem: Acute gout of foot    Chief Complaint:   Chief Complaint   Patient presents with    Bilateral foot pain      Was d/c'ed 3 days ago for pneumonia; pt's family called EMS bc "they can't take care of her bc they are leaving for vacation". Pt usually uses walker to ambulate, states "I can't walk bc my feet hurt".        HPI: Ms. Susan Puente is a 71 y.o F with MDS on treatment with Vidaza (currently receiving decitabine due to national Vidaza shortage), NIDDM2, HTN, CAD, anxiety, depression, and gout. She was recently admitted 4/23/22 to 4/27/22 with CAP. She completed a course of Azithromycin and was discharged with home health, oxygen, and a 7 day course of Levaquin. She re-presented to the ED on 4/29 complaining of foot pain making it difficult to ambulate at home. Redness noted to bilateral great toes and X-ray showing soft tissue edema suggestive of gout to right foot and bunion to left foot.     Pulmonology consulted for increased oxygen requirements (now on 4L NC) and evaluation of worsening RML consolidative process on CT s/p treatment for CAP.      Hospital Course: No notes on file    Interval History:  5/6 continued to have O2 sats in the low 90s despite 3L NC and repositioning. CT Chest: Increased RML Consolidation  5/7 CT-A: Negative for PE; ABG with hypoxemia    Past Medical History:   Diagnosis Date    Allergic rhinitis     Allergy     Anemia     Anxiety     CAD (coronary artery disease)     Carotid stenosis     Colon polyps 2016    Controlled type 2 diabetes mellitus without complication, without long-term current use of insulin 10/19/2016    Depression     GERD (gastroesophageal reflux disease)     " Hearing loss in right ear     Hx of psychiatric care     Celexa, Prozac    Hypokalemia 2/3/2020    MDS (myelodysplastic syndrome) with 5q deletion     Psychiatric problem     Therapy        Past Surgical History:   Procedure Laterality Date    BONE MARROW BIOPSY Left 6/7/2018    Procedure: Biopsy-bone marrow;  Surgeon: Gita Valdes MD;  Location: [facility] OR 2ND FLR;  Service: Oncology;  Laterality: Left;    BONE MARROW BIOPSY Left 3/21/2019    Procedure: Biopsy-bone marrow;  Surgeon: Gita Valdes MD;  Location: [facility] OR 2ND FLR;  Service: Oncology;  Laterality: Left;    BONE MARROW BIOPSY Left 10/24/2019    Procedure: Biopsy-bone marrow;  Surgeon: Gita Valdes MD;  Location: [facility] OR 2ND FLR;  Service: Oncology;  Laterality: Left;  left iliac crest    BONE MARROW BIOPSY Left 4/21/2021    Procedure: Biopsy-bone marrow;  Surgeon: Gita Valdes MD;  Location: [facility] ENDO (4TH FLR);  Service: Oncology;  Laterality: Left;    BUNIONECTOMY      CAROTID ENDARTERECTOMY Left 2011    CAROTID STENT      COLONOSCOPY N/A 12/20/2016    Procedure: COLONOSCOPY;  Surgeon: PATRICIA Simpson MD;  Location: [facility] ENDO (4TH FLR);  Service: Endoscopy;  Laterality: N/A;  pt has vomiting with anesthesia in past    ENDOMETRIAL ABLATION      for endometriosis    INSERTION OF TUNNELED CENTRAL VENOUS CATHETER (CVC) WITH SUBCUTANEOUS PORT N/A 2/19/2020    Procedure: FTVCBCHSO-MDCI-X-CATH;  Surgeon: Deepa Diagnostic Provider;  Location: [facility] OR [facility] FLR;  Service: Radiology;  Laterality: N/A;  189    TONSILLECTOMY      TYMPANOSTOMY TUBE PLACEMENT         Review of patient's allergies indicates:   Allergen Reactions    Revlimid [lenalidomide] Swelling     Facial swelling  6/8/17 - in the process of desensitation       No current facility-administered medications on file prior to encounter.     Current Outpatient Medications on File Prior to Encounter   Medication Sig    acetaminophen (TYLENOL) 650 MG TbSR Take 1,300 mg by mouth daily as needed  (Headache).    acyclovir (ZOVIRAX) 400 MG tablet Take 1 tablet (400 mg total) by mouth 2 (two) times daily.    aspirin (ECOTRIN) 81 MG EC tablet Take 1 tablet (81 mg total) by mouth once daily.    azacitidine (VIDAZA INJ) Inject as directed every 28 days.    carvediloL (COREG) 12.5 MG tablet Take 1 tablet (12.5 mg total) by mouth 2 (two) times daily with meals.    ciprofloxacin HCl (CIPRO) 500 MG tablet Take 1 tablet (500 mg total) by mouth 2 (two) times daily.    citalopram (CELEXA) 20 MG tablet Take 1 tablet (20 mg total) by mouth once daily.    evolocumab (REPATHA SURECLICK) 140 mg/mL PnIj Inject 140mg (1 pen) into the skin every 2 weeks. (Patient taking differently: Inject 140mg (1 pen) into the skin every 2 weeks.)    fluconazole (DIFLUCAN) 200 MG Tab Take 2 tablets (400 mg total) by mouth once daily.    ketotifen (ZADITOR) 0.025 % (0.035 %) ophthalmic solution Place 2-3 drops into both eyes daily as needed (Allergies).    lactose-reduced food (ENSURE ORAL) Take by mouth daily as needed (supplement).    lisinopriL-hydrochlorothiazide (PRINZIDE,ZESTORETIC) 20-12.5 mg per tablet Take 2 tablets by mouth once daily.    loperamide (IMODIUM) 2 mg capsule Take 4 mg by mouth daily as needed for Diarrhea.    loratadine (CLARITIN) 10 mg tablet Take 10 mg by mouth daily as needed.     magnesium oxide (MAGOX) 400 mg (241.3 mg magnesium) tablet Take 1 tablet (400 mg total) by mouth once daily.    melatonin (MELATIN) 5 mg Take 10 mg by mouth every evening.    MULTIVITAMIN ORAL Take 1 tablet by mouth once daily.    pantoprazole (PROTONIX) 40 MG tablet Take 1 tablet (40 mg total) by mouth once daily.    polyethylene glycol (MIRALAX) 17 gram PwPk Take 17 g by mouth daily as needed (Constipation).    potassium chloride SA (K-DUR,KLOR-CON) 20 MEQ tablet Take 1 tablet (20 mEq total) by mouth once daily.    deferasirox (EXJADE) 500 MG disintegrating tablet Take 3 tablets (1,500 mg total) by mouth before  breakfast.    lancets 30 gauge Misc Use to test blood sugar daily (Patient taking differently: Use to test blood sugar daily)     Family History     Problem Relation (Age of Onset)    Alcohol abuse Father, Brother    Cancer Paternal Grandmother    Heart disease Mother, Father    Hyperlipidemia Mother        Tobacco Use    Smoking status: Former Smoker     Packs/day: 1.50     Years: 50.00     Pack years: 75.00     Types: Cigarettes, Vaping with nicotine     Quit date: 3/3/2017     Years since quittin.1    Smokeless tobacco: Never Used   Substance and Sexual Activity    Alcohol use: No    Drug use: No    Sexual activity: Not on file     Review of Systems   Constitutional: Positive for activity change.   HENT: Negative.    Eyes: Negative.    Respiratory: Negative.    Cardiovascular: Negative.    Gastrointestinal: Negative.    Endocrine: Negative.    Genitourinary: Negative.    Musculoskeletal: Positive for arthralgias (BL feet and R hand).   Skin: Positive for color change.        Redness of bilateral great toes   Allergic/Immunologic: Positive for immunocompromised state.   Neurological: Negative.    Hematological: Negative.    Psychiatric/Behavioral: Negative.      Objective:     Vital Signs (Most Recent):  Temp: 97.6 °F (36.4 °C) (22 1140)  Pulse: 67 (22 1140)  Resp: (!) 22 (22 1140)  BP: (!) 140/66 (22 1140)  SpO2: (!) 94 % (22 1140) Vital Signs (24h Range):  Temp:  [97.6 °F (36.4 °C)-99.5 °F (37.5 °C)] 97.6 °F (36.4 °C)  Pulse:  [67-82] 67  Resp:  [16-22] 22  SpO2:  [83 %-94 %] 94 %  BP: (138-165)/(63-72) 140/66     Weight: 68.8 kg (151 lb 10.8 oz)  Body mass index is 25.24 kg/m².    Intake/Output Summary (Last 24 hours) at 2022 1331  Last data filed at 2022 0732  Gross per 24 hour   Intake 600 ml   Output 2175 ml   Net -1575 ml      Physical Exam  Constitutional:       Appearance: Normal appearance.   HENT:      Head: Normocephalic and atraumatic.      Mouth/Throat:       Mouth: Mucous membranes are moist.      Pharynx: Oropharynx is clear.   Eyes:      Extraocular Movements: Extraocular movements intact.      Conjunctiva/sclera: Conjunctivae normal.      Pupils: Pupils are equal, round, and reactive to light.   Cardiovascular:      Rate and Rhythm: Normal rate.      Pulses: Normal pulses.      Heart sounds: Normal heart sounds.   Pulmonary:      Effort: Pulmonary effort is normal.      Breath sounds: Rales (diffuse, bilaterally) present.   Abdominal:      General: Abdomen is flat. There is no distension.      Palpations: Abdomen is soft.      Tenderness: There is no abdominal tenderness. There is no guarding.   Musculoskeletal:         General: Tenderness (BL great toes) present.      Cervical back: Normal range of motion and neck supple.   Skin:     General: Skin is warm.      Capillary Refill: Capillary refill takes 2 to 3 seconds.      Comments: R chest wall port c/d/i   Neurological:      General: No focal deficit present.      Mental Status: She is alert and oriented to person, place, and time.         Significant Labs:   ABGs:   Recent Labs   Lab 05/07/22  1646   PH 7.447   PCO2 45.8*   HCO3 31.6*   POCSATURATED 90*   BE 8   PO2 57*     CBC:   Recent Labs   Lab 05/08/22  0402 05/09/22  0358   WBC 7.40 7.12   HGB 7.6* 7.1*   HCT 23.8* 22.3*   * 114*     CMP:   Recent Labs   Lab 05/08/22  0402 05/09/22  0358   * 134*   K 3.9 4.0   CL 95 92*   CO2 32* 34*   GLU 85 91   BUN 15 15   CREATININE 0.6 0.6   CALCIUM 8.6* 8.2*   PROT 5.2* 5.2*   ALBUMIN 1.9* 1.8*   BILITOT 0.6 0.6   ALKPHOS 91 87   AST 50* 59*   ALT 57* 65*   ANIONGAP 8 8   EGFRNONAA >60.0 >60.0       Significant Imaging: I have reviewed all pertinent imaging results/findings within the past 24 hours.        Assessment/Plan:      Acute respiratory failure with hypoxia  Patient admitted with acute hypoxic respiratory failure, she was treated for CAP with Azithromycin during 4/23-4/27 admission and a 7  day course of Levaquin, which she completed on 4/30.  - CXR with pulmonary consolidation and fever on admission  - Started on Cefepime further infectious work up unrevealing and afebrile >48 hours and Cefepime discontinued on 5/3. Given increasing O2 requirements over the weekend with persistent RML consolidation, Cefepime resumed 5/8.   - Continue scheduled DuoNebs  - Plan for bronchoscopy to determine etiology of RML consolidation      Active Diagnoses:    Diagnosis Date Noted POA    PRINCIPAL PROBLEM:  Acute gout of foot [M10.9] 04/29/2022 Yes    Hyponatremia [E87.1] 05/05/2022 No    Physical debility [R53.81] 04/29/2022 Yes    Acute respiratory failure with hypoxia [J96.01] 04/26/2022 Yes    Pancytopenia due to antineoplastic chemotherapy [D61.810, T45.1X5A] 04/26/2022 Yes    CAP (community acquired pneumonia) [J18.9] 04/24/2022 Yes    Controlled type 2 diabetes mellitus without complication, without long-term current use of insulin [E11.9] 07/17/2021 Yes    Insomnia [G47.00] 01/06/2020 Yes    MDS (myelodysplastic syndrome) [D46.9] 06/07/2018 Yes    Adjustment disorder with mixed anxiety and depressed mood [F43.23] 03/05/2018 Yes    Essential hypertension [I10] 12/01/2016 Yes    CAD (coronary artery disease) [I25.10]  Yes      Problems Resolved During this Admission:    Diagnosis Date Noted Date Resolved POA    Poor appetite [R63.0] 05/05/2022 05/07/2022 Yes    Fever [R50.9] 05/01/2022 05/07/2022 Yes     VTE Risk Mitigation (From admission, onward)         Ordered     enoxaparin injection 40 mg  Daily         04/29/22 1651     IP VTE HIGH RISK PATIENT  Once         04/29/22 1651     Place sequential compression device  Until discontinued         04/29/22 1651                   Braden Orantes, MS-4  [facility]

## 2022-05-09 NOTE — PLAN OF CARE
Problem: Physical Therapy  Goal: Physical Therapy Goal  Description: Goals to be met by: 22, updated on 22    Patient will increase functional independence with mobility by performin. Supine to sit with modified independence  2. Sit to supine with modified independence  3. Sit to stand transfer with supervision  4. Bed to chair transfer with contact guard assistance using LRAD as needed  5. Gait  x 10 feet with minimum assistance using LRAD as needed  6. Ascend/descend 1 flight of stair with appropriate handrails minimum assistance using LRAD as needed  7. Lower extremity exercise program x10 reps per handout, with IND    Outcome: Ongoing, Progressing     Goals currently extended on this date, continue to be appropriate.    Eden Meade, PT, DPT, GCS  2022

## 2022-05-09 NOTE — ASSESSMENT & PLAN NOTE
Patient admitted with acute hypoxic respiratory failure, she was treated for PNA during recent admission and was initiated on home oxygen after 6 min walk test previous admission.  - CXR with pulmonary consolidation and fever on admission, recently completed levaquin/azithro course for CAP.   - Started on Cefepime on admission, further infectious work up unrevealing and afebrile >48 hours and Cefepime discontinued 5/3. Given increasing O2 requirements over the weekend with persistent RML consolidation, Cefepime resumed 5/8.   - Continue scheduled DuoNebs  - 5/6 CT Chest: Increased RML Consolidation  - 5/7 CT-A: Negative for PE; ABG with hypoxemia.   - 5/9: Pulmonary consulted given increasing O2 requirements

## 2022-05-09 NOTE — ASSESSMENT & PLAN NOTE
- Transfuse for hgb < 7 or plts < 10K  - Daily cbc while inpatient  - Continue acyclovir and fluconazole ppx  - Will resume cipro ppx when cefepime d/c'd

## 2022-05-09 NOTE — PLAN OF CARE
Patient resting in bed: side rails up x 2, lowest position, bed locked and call bell in place. Plan of care was reviewed with patient; Cefipime Q8H initiated . She is tolerating PO intake. She is unable to ambulate but able to turn in bed; purewick in place. Afebrile and vitals remain stable; tele in place with continuous O2 monitoring. Labs obtained, reviewed and PRN electrolyte replacement orders reviewed. Frequent rounding completed and patient encouraged to call as needed. Will continue to monitor patient.     Problem: Adult Inpatient Plan of Care  Goal: Plan of Care Review  Outcome: Ongoing, Progressing  Goal: Patient-Specific Goal (Individualized)  Outcome: Ongoing, Progressing  Goal: Absence of Hospital-Acquired Illness or Injury  Outcome: Ongoing, Progressing  Goal: Optimal Comfort and Wellbeing  Outcome: Ongoing, Progressing  Goal: Readiness for Transition of Care  Outcome: Ongoing, Progressing     Problem: Diabetes Comorbidity  Goal: Blood Glucose Level Within Targeted Range  Outcome: Ongoing, Progressing     Problem: Adjustment to Illness (Sepsis/Septic Shock)  Goal: Optimal Coping  Outcome: Ongoing, Progressing     Problem: Bleeding (Sepsis/Septic Shock)  Goal: Absence of Bleeding  Outcome: Ongoing, Progressing     Problem: Glycemic Control Impaired (Sepsis/Septic Shock)  Goal: Blood Glucose Level Within Desired Range  Outcome: Ongoing, Progressing     Problem: Infection Progression (Sepsis/Septic Shock)  Goal: Absence of Infection Signs and Symptoms  Outcome: Ongoing, Progressing     Problem: Nutrition Impaired (Sepsis/Septic Shock)  Goal: Optimal Nutrition Intake  Outcome: Ongoing, Progressing     Problem: Fluid and Electrolyte Imbalance (Acute Kidney Injury/Impairment)  Goal: Fluid and Electrolyte Balance  Outcome: Ongoing, Progressing     Problem: Oral Intake Inadequate (Acute Kidney Injury/Impairment)  Goal: Optimal Nutrition Intake  Outcome: Ongoing, Progressing     Problem: Renal Function  Impairment (Acute Kidney Injury/Impairment)  Goal: Effective Renal Function  Outcome: Ongoing, Progressing     Problem: Fluid Imbalance (Pneumonia)  Goal: Fluid Balance  Outcome: Ongoing, Progressing     Problem: Infection (Pneumonia)  Goal: Resolution of Infection Signs and Symptoms  Outcome: Ongoing, Progressing     Problem: Respiratory Compromise (Pneumonia)  Goal: Effective Oxygenation and Ventilation  Outcome: Ongoing, Progressing     Problem: Infection  Goal: Absence of Infection Signs and Symptoms  Outcome: Ongoing, Progressing     Problem: Skin Injury Risk Increased  Goal: Skin Health and Integrity  Outcome: Ongoing, Progressing     Problem: Impaired Wound Healing  Goal: Optimal Wound Healing  Outcome: Ongoing, Progressing     Problem: Fall Injury Risk  Goal: Absence of Fall and Fall-Related Injury  Outcome: Ongoing, Progressing

## 2022-05-09 NOTE — PROGRESS NOTES
[facility] - Oncology ([facility])  Hematology  Bone Marrow Transplant  Progress Note    Patient Name: Susan Puente  Admission Date: 4/29/2022  Hospital Length of Stay: 7 days  Code Status: Full Code    Subjective:     Interval History: Up in chair and feeling improved this AM. Afebrile. Continues to require 4L O2 with 93-94% saturations, pulm consulted today. CT-A Chest negative for PE. Continues duonebs. Cefepime started yesterday given concern for ongoing RML consolidation on CT and O2 requirements. Appetite slightly improving. Foot pain/wrist pain improving. No new complaints.      Objective:     Vital Signs (Most Recent):  Temp: 98.5 °F (36.9 °C) (05/09/22 0732)  Pulse: 76 (05/09/22 0754)  Resp: 20 (05/09/22 0754)  BP: (!) 163/71 (05/09/22 0732)  SpO2: (!) 92 % (05/09/22 0754)   Vital Signs (24h Range):  Temp:  [97.7 °F (36.5 °C)-99.5 °F (37.5 °C)] 98.5 °F (36.9 °C)  Pulse:  [70-82] 76  Resp:  [16-22] 20  SpO2:  [83 %-94 %] 92 %  BP: (133-165)/(60-72) 163/71     Weight: 68.8 kg (151 lb 10.8 oz)  Body mass index is 25.24 kg/m².  Body surface area is 1.78 meters squared.    ECOG SCORE           Intake/Output - Last 3 Shifts         05/07 0700 05/08 0659 05/08 0700 05/09 0659 05/09 0700  05/10 0659    P.O. 607 850     Blood       Total Intake(mL/kg) 607 (8.7) 850 (12.4)     Urine (mL/kg/hr) 1700 (1) 2875 (1.7)     Stool       Total Output 1700 2875     Net -1093 -2025            Urine Occurrence 500 x              Physical Exam  Vitals and nursing note reviewed.   Constitutional:       Appearance: She is well-developed.   HENT:      Head: Normocephalic and atraumatic.      Mouth/Throat:      Mouth: Mucous membranes are moist.      Pharynx: No oropharyngeal exudate.   Eyes:      Conjunctiva/sclera: Conjunctivae normal.      Pupils: Pupils are equal, round, and reactive to light.   Cardiovascular:      Rate and Rhythm: Normal rate and regular rhythm.      Heart sounds: Normal heart sounds. No murmur  heard.  Pulmonary:      Effort: Pulmonary effort is normal.      Breath sounds: Normal breath sounds. No wheezing or rales.      Comments: Diminished lung sounds  Abdominal:      General: Bowel sounds are normal. There is no distension.      Palpations: Abdomen is soft.      Tenderness: There is no abdominal tenderness.   Musculoskeletal:         General: No deformity. Normal range of motion.      Cervical back: Normal range of motion and neck supple.      Comments: Redness to bilat great toes.   Skin:     General: Skin is warm and dry.      Findings: No erythema or rash.      Comments: Right chest wall port. Dressing c/d/i. No sign of infection to site.   Neurological:      Mental Status: She is alert and oriented to person, place, and time.      Cranial Nerves: No cranial nerve deficit.      Motor: Weakness (generalized weakness) present.   Psychiatric:         Behavior: Behavior normal.         Thought Content: Thought content normal.         Judgment: Judgment normal.       Significant Labs:   CBC:   Recent Labs   Lab 05/08/22  0402 05/09/22  0358   WBC 7.40 7.12   HGB 7.6* 7.1*   HCT 23.8* 22.3*   * 114*    and CMP:   Recent Labs   Lab 05/08/22  0402 05/09/22  0358   * 134*   K 3.9 4.0   CL 95 92*   CO2 32* 34*   GLU 85 91   BUN 15 15   CREATININE 0.6 0.6   CALCIUM 8.6* 8.2*   PROT 5.2* 5.2*   ALBUMIN 1.9* 1.8*   BILITOT 0.6 0.6   ALKPHOS 91 87   AST 50* 59*   ALT 57* 65*   ANIONGAP 8 8   EGFRNONAA >60.0 >60.0       Diagnostic Results:  I have reviewed and interpreted all pertinent imaging results/findings within the past 24 hours.    Assessment/Plan:     * Acute gout of foot  - patient reports difficulty bearing weight on bilat feet due to pain, improving  - x-ray showing soft tissue edema suggestive of gout flare  - uric acid wnl  - allopurinol 100 mg daily for prevention  - cooling muscle cream PRN for additional pain support     Acute respiratory failure with hypoxia  Patient admitted with  acute hypoxic respiratory failure, she was treated for PNA during recent admission and was initiated on home oxygen after 6 min walk test previous admission.  - CXR with pulmonary consolidation and fever on admission, recently completed levaquin/azithro course for CAP.   - Started on Cefepime on admission, further infectious work up unrevealing and afebrile >48 hours and Cefepime discontinued 5/3. Given increasing O2 requirements over the weekend with persistent RML consolidation, Cefepime resumed 5/8.   - Continue scheduled DuoNebs  - 5/6 CT Chest: Increased RML Consolidation  - 5/7 CT-A: Negative for PE; ABG with hypoxemia.   - 5/9: Pulmonary consulted given increasing O2 requirements     MDS (myelodysplastic syndrome)  - Primary oncologist, Dr. 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 Developed allergy and was then desensitized. Soon after developed pancytopenia and Revlimid was stopped June 2017.    - BMBX on 6/8/17 was with normal cytogenetics. 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 Of 20 metaphases, 5 were normal and 15 had a 5q deletion, NGS positive for SF3B1 and TP53 (12%). 1.4% blasts  - Received vidaza for 22 cycles, received Decitabine for C23 due to national shortage of vidaza and then back to vidaza with C24.  - completed cycle 39 on 4/8/22  - due for cycle 40 on 05/02/2022, postponed whiled admitted and to be readdressed as outpatient     Essential hypertension  - At home regimen:  lisinopril/hctz and coreg  - SBP 170s-180s. Increased Coreg dose to 25mg with minimal improveemt  - Discussed with cardiology who recommended switching HCTZ to Chlorthalidone 25mg daily and adding Nifedipine XL 60mg with outpatient follow up. Nifedipine was held d/t hypotension and to allow for some permissible hypertension given clinical status  - Current anti-HTN regimen: Lisinopril 40mg daily, Coreg 25mg BID, Chlorthalidone 25mg daily. Nifedipine held d/t hypotension. BP largely stable, with occasional SBP 160s, continuing present regimen to allow fo  - Will need cardiology f/u outpatient    Hyponatremia  - Suspect insetting of hypovolemia  - Mild, Na 130 today. Improved on IVF    Physical debility  - reports difficulty with ambulation due to pain and weakness  - PT/OT consulted. Recommending SNF.  - Adding Boost with meals for nutritional support    Pancytopenia due to antineoplastic chemotherapy  - Transfuse for hgb < 7 or plts < 10K  - Daily cbc while inpatient  - Continue acyclovir and fluconazole ppx  - Will resume cipro ppx when cefepime d/c'd    CAP (community acquired pneumonia)  - Pt with 2 weeks of productive cough, nasal congestion  - Upon arrival to the hospial she was hypoxic requiring 3L NC, weaned to 2L today  - CXR with likely develop consolidation  - CTA shows New patchy consolidative opacities in the in the right upper and posterior right lower lobes.  Findings suggestive infectious etiology such as pneumonia, multifocal pneumonia.  Also consider atypical given patient's reported immunocompromised status.  Aspiration could present similarly.  Consider continued follow-up after acute clinical illness has resolved to ensure resolution. New mediastinal and bilateral hilar lymphadenopathy, likely reactive.  - completed azithromycin and 7 day course of Levaquin on 4/30.  - CXR on admission showing possible developing consolidation: Cefepime 4/30-5/3    - See acute respiratory failure with hypoxia  above    Controlled type 2 diabetes mellitus without complication, without long-term current use of insulin  - Will hold home po diabetic medications  - SSI, ACHS blood glucose monitoring, and diabetic diet  - Goal bg 140-180    Hypokalemia  - K+ 4.0 today  - replacing per prn order set  - daily CMP while inpatient    Insomnia  - Continue nightly melatonin  - Adding remeron 5/5    Adjustment disorder with mixed anxiety and depressed mood  - continue home celexa    CAD (coronary artery disease)  - S/P left carotid endarterectomy prior to cancer dx, s/p PCI (3 stents) >20 years ago   - Continue on ASA 81mg daily, platelets are wnl  - On repatha as outpatient (statin intolerant)        VTE Risk Mitigation (From admission, onward)         Ordered     enoxaparin injection 40 mg  Daily         04/29/22 1651     IP VTE HIGH RISK PATIENT  Once         04/29/22 1651     Place sequential compression device  Until discontinued         04/29/22 1651                Disposition: Remain inpatient     Jolanta Guy PA-C  Bone Marrow Transplant  [facility] (Hospital)

## 2022-05-09 NOTE — MED STUDENT ASSESSMENT & PLAN
Acute respiratory failure with hypoxia  Patient admitted with acute hypoxic respiratory failure, she was treated for CAP with Azithromycin during 4/23-4/27 admission and a 7 day course of Levaquin, which she completed on 4/30.  - CXR with pulmonary consolidation and fever on admission  - Started on Cefepime further infectious work up unrevealing and afebrile >48 hours and Cefepime discontinued on 5/3. Given increasing O2 requirements over the weekend with persistent RML consolidation, Cefepime resumed 5/8.   - Continue scheduled DuoNebs  - CT chest significant for worsening of RLL consolidation.    - Concern for either untreated infection or alternative etiology including  or other inflammatory process.   - Pt tolerated bronchoscopy on 5/11 well. Cytology: 1 - Negative for malignant cells.  Benign bronchial cells. Inflammation and macrophages present.  AFB/KOH staining negative. Gram stain negative. AFB and bacterial/fungal cultures no growth to date.  - Pt on 5L NC, O2 sats 86-88%, increased to 5.5L NC  - On 5/13, started Prednisone 60mg qd for 2 weeks then taper down by 10mg q2 weeks until completion  - continue GI and PJP prophylaxis per primary team recs  - continue aggressive PT/OT  - Awaiting SNF placement. Recommend f/u in clinic 4-6 weeks  - recommend Echo to r/o causes for persistent hypoxia  - Echo ordered for today

## 2022-05-09 NOTE — PT/OT/SLP PROGRESS
Occupational Therapy   Treatment    Name: Susan Puente  MRN: [id number]  Admitting Diagnosis:  Acute gout of foot       Recommendations:     Discharge Recommendations: nursing facility, skilled  Discharge Equipment Recommendations:   (TBD)  Barriers to discharge:  Inaccessible home environment    Assessment:     Susan Puente is a 71 y.o. female with a medical diagnosis of Acute gout of foot.  She presents with deficits in self-care tasks as well as functional mobility and endurance. Pt. Tolerated session well on this date and reported less pain in BLE but increased pain in Right hand.  Pt. Was able to perform SQPT to bedside chair with Mod A. Pt. Performed grooming tasks seated EOB with S. Pt. demonstrated good understanding of importance of OOB to improve endurance . Performance deficits affecting function are weakness, impaired endurance, impaired self care skills, impaired functional mobilty, pain, decreased lower extremity function, decreased upper extremity function, gait instability.     Rehab Prognosis:  Good; patient would benefit from acute skilled OT services to address these deficits and reach maximum level of function.       Plan:     Patient to be seen 3 x/week to address the above listed problems via self-care/home management, therapeutic activities, therapeutic exercises, neuromuscular re-education  · Plan of Care Expires: 05/30/22  · Plan of Care Reviewed with: patient, spouse    Subjective     Pain/Comfort:  · Pain Rating 1: 6/10  · Location - Side 1: Right  · Location 1: hand  · Pain Addressed 1: Reposition, Distraction, Nurse notified  · Pain Rating Post-Intervention 1: 6/10    Objective:     Communicated with: nurse prior to session.  Patient found supine with PureWick, oxygen, telemetry, pulse ox (continuous) upon OT entry to room.    General Precautions: Standard, fall   Orthopedic Precautions:N/A   Braces: N/A  Respiratory Status: Nasal cannula, flow 3.5 L/min     Occupational  Performance:     Bed Mobility:    · Patient completed Supine to Sit with minimum assistance     Functional Mobility/Transfers:  · Patient completed Sit <> Stand Transfer with moderate assistance  with  no assistive device   · Patient completed Bed <> Chair Transfer using Squat Pivot technique with moderate assistance with no assistive device  · Functional Mobility: not tested    Activities of Daily Living:  · Grooming: supervision seated EOB to wash face and swab mouth  · Lower Body Dressing: total assistance to don socks      AMPAC 6 Click ADL: 18    Treatment & Education:  Pt. Educated on importance of OOB and there ex     Patient left up in chair with all lines intact, call button in reach and nurse notifiedEducation:      GOALS:   Multidisciplinary Problems     Occupational Therapy Goals        Problem: Occupational Therapy    Goal Priority Disciplines Outcome Interventions   Occupational Therapy Goal     OT, PT/OT Ongoing, Progressing    Description: Goals to be met by: 5/14/22     Patient will increase functional independence with ADLs by performing:    LE Dressing with Set-up Assistance.  Grooming while standing at sink with Stand-by Assistance.  Toileting from toilet with Minimal Assistance for hygiene and clothing management.   Toilet transfer to toilet with Stand-by Assistance.  Functional mobility of household and community distance with  minimum assistance and AD as needed                       Time Tracking:     OT Date of Treatment: 05/09/22  OT Start Time: 0819  OT Stop Time: 0842  OT Total Time (min): 23 min    Billable Minutes:Self Care/Home Management 23    OT/ADRYAN: OT          5/9/2022

## 2022-05-09 NOTE — PLAN OF CARE
Pt involved in plan of care and communicating needs throughout shift. Blood glucose checked AC/HS. Pt able to transfer (with PT & OT) and sit in bedside chair for most of shift. Pt on 4L NC with sats between 92-94%. no c/o pain. Appetite slightly better today. Voiding without difficulty into Purwick. Electrolytes replaced as ordered. All VSS; no acute events so far this shift.  Pt remaining free from falls or injury throughout shift; bed locked and in lowest position; call light within reach.  Pt instructed to call for assistance as needed.  Q1H rounding done on pt. WCTM.

## 2022-05-09 NOTE — PT/OT/SLP PROGRESS
Physical Therapy Treatment    Patient Name:  Susan Puente   MRN:  [id number]    Recommendations:     Discharge Recommendations:  nursing facility, skilled   Discharge Equipment Recommendations:  (TBD)   Barriers to discharge: Inaccessible home, Decreased caregiver support and increased assistance required    Assessment:     Susan Puente is a 71 y.o. female admitted with a medical diagnosis of Acute gout of foot.  She presents with the following impairments/functional limitations:  weakness, impaired endurance, impaired functional mobilty, gait instability, impaired balance, decreased safety awareness, pain, decreased lower extremity function.  Pt seen for three separate treatment sessions on this date to aide in transfers. Initial sit>stand from recliner with RN to assist with placement of bed pain for bowel movement, second to assist with standing to remove/perform hygiene care, and third to complete safe chair>bed transfer on this date. Pt requiring mod to max assist for transfers, notably increasing with fatigue throughout multiple transfers. Pt motivated to progress, endorsing decreased RLE pain from gout on this date with improving activity tolerance. Pt continues to present below their independent prior functional baseline. Pt would benefit from continued, skilled PT while in house to address the above listed impairments, further progress mobility as able, return towards highest level of function.      Rehab Prognosis: Good; patient continues to benefit from acute skilled PT services to address these deficits and reach maximum level of function.  Patient remains most appropriate to discharge to nursing facility, skilled  Recent Surgery: * No surgery found *      Plan:     During this hospitalization, patient to be seen 3 x/week to address the identified rehab impairments via gait training, therapeutic activities, therapeutic exercises, neuromuscular re-education and progress toward the following  "goals:    · Plan of Care Expires:  05/30/22    Subjective     Subjective: "I couldn't stand up like this a few days ago!"   Pain/Comfort:   · Pain Rating 1: other (see comments) (did not rate, c/o pain in RLE)  · Pain Addressed 1: Reposition, Distraction, Cessation of Activity      Objective:     Communicated with RN prior to session.  Patient found up in chair with peripheral IV, pulse ox (continuous), oxygen, telemetry, PureWick upon PT entry to room.      General Precautions: Standard, fall   Orthopedic Precautions:N/A   Braces: N/A     Functional Mobility:  · Bed Mobility: max assist x1 for sit>supine with HOB flat, increased assistance for trunk and BLE management required  · Transfers: Pt performed three transfers on this date. First sit<>stand from recliner with max assist x1 with increased assistance required to clear buttocks, requiring two full attempts prior to successful standing to place bed pain underneath pt while seated with RN present and also assistance. On second attempt, mod assist for sit<>stand with use of momentum which improved hip clearance, pt stood for ~6 minutes with RN performing hygiene care. On third attempt, pt required mod assist to perform stand pivot transfer, able to take ~2-3 small steps to complete transfer with cueing for hand placement/technique.   · Balance:   · Sitting: supervision while seated EOB without posterior back support for ~5 minutes  · Standing: three sperate standing trials throughout three separate treatments. On first, max assist x1 to attempt static standing to place bed pain underneath hips. On third attempt, pt able to stand for ~6 minutes with mod assist x1 with excessive anterior sway. Pt required frequent cueing to maintain upright standing, however able to demo appropriate trunk control to compensate for this with standing for prolonged period of time. Mod assist for stand pivot transfer from chair>commode, again with anterior trunk sway throughout all " standing.       AM-PAC 6 CLICK MOBILITY  Turning over in bed (including adjusting bedclothes, sheets and blankets)?: 3  Sitting down on and standing up from a chair with arms (e.g., wheelchair, bedside commode, etc.): 2  Moving from lying on back to sitting on the side of the bed?: 3  Moving to and from a bed to a chair (including a wheelchair)?: 2  Need to walk in hospital room?: 1  Climbing 3-5 steps with a railing?: 1  Basic Mobility Total Score: 12       Education:  Education provided re: d/c planning, PT POC, safety in mobility, benefits of mobility, concerns for d/c. Pt endorsed understanding.     Patient left supine with all lines intact and call button in reach.    GOALS:   Multidisciplinary Problems     Physical Therapy Goals        Problem: Physical Therapy    Goal Priority Disciplines Outcome Goal Variances Interventions   Physical Therapy Goal     PT, PT/OT Ongoing, Progressing     Description: Goals to be met by: 22, updated on 22    Patient will increase functional independence with mobility by performin. Supine to sit with modified independence  2. Sit to supine with modified independence  3. Sit to stand transfer with supervision  4. Bed to chair transfer with contact guard assistance using LRAD as needed  5. Gait  x 10 feet with minimum assistance using LRAD as needed  6. Ascend/descend 1 flight of stair with appropriate handrails minimum assistance using LRAD as needed  7. Lower extremity exercise program x10 reps per handout, with IND                     Time Tracking:     PT Received On: 22  PT Start Time #1: 9:54     PT Stop Time #1: 10:07  PT Total Time (min): 13 min     PT Start Time: 10:25     PT Stop Time: 10:42  PT Total Time (min): 17 min     PT Start Time: 1345     PT Stop Time: 1356  PT Total Time (min): 11 min     PT Total Time (min): 41 min    Billable Minutes: Therapeutic Activity 40 min    Treatment Type: Treatment  PT/PTA: PT     PTA Visit Number: 0     Eden  Halina, PT, DPT  5/9/2022

## 2022-05-09 NOTE — ASSESSMENT & PLAN NOTE
- Pt with 2 weeks of productive cough, nasal congestion  - Upon arrival to the hospial she was hypoxic requiring 3L NC, weaned to 2L today  - CXR with likely develop consolidation  - CTA shows New patchy consolidative opacities in the in the right upper and posterior right lower lobes.  Findings suggestive infectious etiology such as pneumonia, multifocal pneumonia.  Also consider atypical given patient's reported immunocompromised status.  Aspiration could present similarly.  Consider continued follow-up after acute clinical illness has resolved to ensure resolution. New mediastinal and bilateral hilar lymphadenopathy, likely reactive.  - completed azithromycin and 7 day course of Levaquin on 4/30.  - CXR on admission showing possible developing consolidation: Cefepime 4/30-5/3    - See acute respiratory failure with hypoxia above

## 2022-05-10 NOTE — ASSESSMENT & PLAN NOTE
- At home regimen: lisinopril/hctz and coreg  - SBP 170s-180s. Increased Coreg dose to 25mg with minimal improveemt  - Discussed with cardiology who recommended switching HCTZ to Chlorthalidone 25mg daily and adding Nifedipine XL 60mg with outpatient follow up. Nifedipine was held d/t hypotension and to allow for some permissible hypertension given clinical status  - Current anti-HTN regimen: Lisinopril 40mg daily, Coreg 25mg BID, Chlorthalidone 25mg daily. Nifedipine held d/t hypotension. BP largely stable, with occasional SBP 160s, continuing present regimen  - Will need cardiology f/u outpatient

## 2022-05-10 NOTE — ASSESSMENT & PLAN NOTE
Patient admitted with acute hypoxic respiratory failure, she was treated for PNA during recent admission and was initiated on home oxygen after 6 min walk test previous admission.  - CXR with pulmonary consolidation and fever on admission, recently completed levaquin/azithro course for CAP.   - Started on Cefepime on admission, further infectious work up unrevealing and afebrile >48 hours and Cefepime discontinued 5/3. Given increasing O2 requirements over the weekend with persistent RML consolidation, Cefepime resumed 5/8.   - Continue scheduled DuoNebs  - 5/6 CT Chest: Increased RML Consolidation  - 5/7 CT-A: Negative for PE; ABG with hypoxemia.   - 5/9: Pulmonary consulted given increasing O2 requirements. Planning for bronch 5/11/22. NPO at midnight.

## 2022-05-10 NOTE — ASSESSMENT & PLAN NOTE
- Transfuse for hgb < 7 or plts < 10K  - Daily cbc while inpatient  - Continue acyclovir and fluconazole ppx  - Will resume cipro ppx when cefepime d/c'd  - transfusing 1 unit prbc for hgb of 6.7

## 2022-05-10 NOTE — PLAN OF CARE
Pt alert and oriented x4. Continues on 02 4L, 96% saturation. Purwick draining clear, aliyah urine. H&H low this AM, Provider made aware, new orders given for type & screen and blood transfusion. Type & screen sent to lab, result pending. Medicated pt with PRN Tramadol last night for c/o pain. Tramadol had effective result. Pt turned and repositioned intermittently. No s/s of active bleeding observed. Afebrile.Safety precaution maintained.

## 2022-05-10 NOTE — PROGRESS NOTES
[facility] Cordova - Oncology ([facility])  Hematology  Bone Marrow Transplant  Progress Note    Patient Name: Susan Puente  Admission Date: 4/29/2022  Hospital Length of Stay: 8 days  Code Status: Full Code    Subjective:     Interval History: Pulm planning bronch tomorrow. NPO at midnight. Continues to be afebrile. VSS. Continues to be on 4L O2 via nasal cannula.  Transfusing 1 unit prbc for hgb of 6.7 today. LFTs mildly elevated. Will monitor for now. Accepted at SNF. Will plan for transfer pending bronch results.    Objective:     Vital Signs (Most Recent):  Temp: 99.4 °F (37.4 °C) (05/10/22 0731)  Pulse: 75 (05/10/22 0731)  Resp: 18 (05/10/22 0731)  BP: (!) 140/64 (05/10/22 0731)  SpO2: (!) 93 % (05/10/22 0731)   Vital Signs (24h Range):  Temp:  [97.2 °F (36.2 °C)-99.4 °F (37.4 °C)] 99.4 °F (37.4 °C)  Pulse:  [66-84] 75  Resp:  [16-22] 18  SpO2:  [93 %-98 %] 93 %  BP: (140-167)/(64-72) 140/64     Weight: 70.7 kg (155 lb 13.8 oz)  Body mass index is 25.94 kg/m².  Body surface area is 1.8 meters squared.    ECOG SCORE           Intake/Output - Last 3 Shifts         05/08 0700 05/09 0659 05/09 0700  05/10 0659 05/10 0700 05/11 0659    P.O. 850 1180     Total Intake(mL/kg) 850 (12.4) 1180 (16.7)     Urine (mL/kg/hr) 2875 (1.7) 1700 (1)     Stool  0     Total Output 2875 1700     Net -2025 -520            Urine Occurrence  1 x     Stool Occurrence  1 x             Physical Exam  Vitals and nursing note reviewed.   Constitutional:       Appearance: She is well-developed.   HENT:      Head: Normocephalic and atraumatic.      Mouth/Throat:      Mouth: Mucous membranes are moist.      Pharynx: No oropharyngeal exudate.   Eyes:      Conjunctiva/sclera: Conjunctivae normal.      Pupils: Pupils are equal, round, and reactive to light.   Cardiovascular:      Rate and Rhythm: Normal rate and regular rhythm.      Heart sounds: Normal heart sounds. No murmur heard.  Pulmonary:      Breath sounds: No wheezing or rales.       Comments: Diminished lung sounds in all fields  Abdominal:      General: Bowel sounds are normal. There is no distension.      Palpations: Abdomen is soft.      Tenderness: There is no abdominal tenderness.   Musculoskeletal:         General: No deformity. Normal range of motion.      Cervical back: Normal range of motion and neck supple.      Comments: Redness to bilat great toes. Improving.   Skin:     General: Skin is warm and dry.      Findings: No erythema or rash.      Comments: Right chest wall port. Dressing c/d/i. No sign of infection to site.   Neurological:      Mental Status: She is alert and oriented to person, place, and time.      Cranial Nerves: No cranial nerve deficit.      Motor: Weakness (generalized weakness) present.   Psychiatric:         Behavior: Behavior normal.         Thought Content: Thought content normal.         Judgment: Judgment normal.       Significant Labs:   CBC:   Recent Labs   Lab 05/09/22 0358 05/10/22  0352   WBC 7.12 6.90   HGB 7.1* 6.7*   HCT 22.3* 21.2*   * 107*      and CMP:   Recent Labs   Lab 05/09/22 0358 05/10/22  0352   * 135*   K 4.0 3.8   CL 92* 94*   CO2 34* 33*   GLU 91 110   BUN 15 20   CREATININE 0.6 0.6   CALCIUM 8.2* 8.4*   PROT 5.2* 5.3*   ALBUMIN 1.8* 1.8*   BILITOT 0.6 0.5   ALKPHOS 87 96   AST 59* 101*   ALT 65* 102*   ANIONGAP 8 8   EGFRNONAA >60.0 >60.0         Diagnostic Results:  I have reviewed and interpreted all pertinent imaging results/findings within the past 24 hours.    Assessment/Plan:     * Acute gout of foot  - patient reports difficulty bearing weight on bilat feet due to pain, improving  - x-ray showing soft tissue edema suggestive of gout flare  - uric acid wnl  - allopurinol 100 mg daily for prevention  - cooling muscle cream PRN for additional pain support     Acute respiratory failure with hypoxia  Patient admitted with acute hypoxic respiratory failure, she was treated for PNA during recent admission and was initiated  on home oxygen after 6 min walk test previous admission.  - CXR with pulmonary consolidation and fever on admission, recently completed levaquin/azithro course for CAP.   - Started on Cefepime on admission, further infectious work up unrevealing and afebrile >48 hours and Cefepime discontinued 5/3. Given increasing O2 requirements over the weekend with persistent RML consolidation, Cefepime resumed 5/8.   - Continue scheduled DuoNebs  - 5/6 CT Chest: Increased RML Consolidation  - 5/7 CT-A: Negative for PE; ABG with hypoxemia.   - 5/9: Pulmonary consulted given increasing O2 requirements. Planning for bronch 5/11/22. NPO at midnight.     CAP (community acquired pneumonia)  - Pt with 2 weeks of productive cough, nasal congestion  - Upon arrival to the hospial she was hypoxic requiring 3L NC, weaned to 2L today  - CXR with likely develop consolidation  - CTA shows New patchy consolidative opacities in the in the right upper and posterior right lower lobes.  Findings suggestive infectious etiology such as pneumonia, multifocal pneumonia.  Also consider atypical given patient's reported immunocompromised status.  Aspiration could present similarly.  Consider continued follow-up after acute clinical illness has resolved to ensure resolution. New mediastinal and bilateral hilar lymphadenopathy, likely reactive.  - completed azithromycin and 7 day course of Levaquin on 4/30.  - CXR on admission showing possible developing consolidation: Cefepime 4/30-5/3    - See acute respiratory failure with hypoxia above    Physical debility  - reports difficulty with ambulation due to pain and weakness  - PT/OT consulted. Recommending SNF. Patient has been accepted SNF. Transfer pending bronch results.  - Adding Boost with meals for nutritional support    MDS (myelodysplastic syndrome)  - Primary oncologist, Dr. 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 Developed allergy and was then desensitized. Soon  after developed pancytopenia and Revlimid was stopped June 2017.    - BMBX on 6/8/17 was with normal cytogenetics. 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 Of 20 metaphases, 5 were normal and 15 had a 5q deletion, NGS positive for SF3B1 and TP53 (12%). 1.4% blasts  - Received vidaza for 22 cycles, received Decitabine for C23 due to national shortage of vidaza and then back to vidaza with C24.  - completed cycle 39 on 4/8/22  - due for cycle 40 on 05/02/2022, postponed whiled admitted and to be readdressed as outpatient     Pancytopenia due to antineoplastic chemotherapy  - Transfuse for hgb < 7 or plts < 10K  - Daily cbc while inpatient  - Continue acyclovir and fluconazole ppx  - Will resume cipro ppx when cefepime d/c'd  - transfusing 1 unit prbc for hgb of 6.7    Hyponatremia  - Suspect insetting of hypovolemia  - Mild, Na 135 today. Improved on IVF    Controlled type 2 diabetes mellitus without complication, without long-term current use of insulin  - Will hold home po diabetic medications  - SSI, ACHS blood glucose monitoring, and diabetic diet  - Goal bg 140-180    Hypokalemia  - K+ 4.0 today  - replacing per prn order set  - daily CMP while inpatient    Insomnia  - Continue nightly melatonin  - Added remeron 5/5    Adjustment disorder with mixed anxiety and depressed mood  - continue home celexa    Essential hypertension  - At home regimen: lisinopril/hctz and coreg  - SBP 170s-180s. Increased Coreg dose to 25mg with minimal  improveemt  - Discussed with cardiology who recommended switching HCTZ to Chlorthalidone 25mg daily and adding Nifedipine XL 60mg with outpatient follow up. Nifedipine was held d/t hypotension and to allow for some permissible hypertension given clinical status  - Current anti-HTN regimen: Lisinopril 40mg daily, Coreg 25mg BID, Chlorthalidone 25mg daily. Nifedipine held d/t hypotension. BP largely stable, with occasional SBP 160s, continuing present regimen  - Will need cardiology f/u outpatient    CAD (coronary artery disease)  - S/P left carotid endarterectomy prior to cancer dx, s/p PCI (3 stents) >20 years ago   - Continue on ASA 81mg daily, platelets are wnl  - On repatha as outpatient (statin intolerant)        VTE Risk Mitigation (From admission, onward)         Ordered     enoxaparin injection 40 mg  Daily         04/29/22 1651     IP VTE HIGH RISK PATIENT  Once         04/29/22 1651     Place sequential compression device  Until discontinued         04/29/22 1651                Disposition: Inpatient.    Altagracia Cornejo, NP  Bone Marrow Transplant  [facility] - Oncology ([facility])

## 2022-05-10 NOTE — ASSESSMENT & PLAN NOTE
- reports difficulty with ambulation due to pain and weakness  - PT/OT consulted. Recommending SNF. Patient has been accepted SNF. Transfer pending bronch results.  - Adding Boost with meals for nutritional support

## 2022-05-10 NOTE — SUBJECTIVE & OBJECTIVE
Subjective:     Interval History: Pulm planning bronch tomorrow. NPO at midnight. Continues to be afebrile. VSS. Continues to be on 4L O2 via nasal cannula.  Transfusing 1 unit prbc for hgb of 6.7 today. LFTs mildly elevated. Will monitor for now. Accepted at [facility]. Will plan for transfer pending bronch results.    Objective:     Vital Signs (Most Recent):  Temp: 99.4 °F (37.4 °C) (05/10/22 0731)  Pulse: 75 (05/10/22 0731)  Resp: 18 (05/10/22 0731)  BP: (!) 140/64 (05/10/22 0731)  SpO2: (!) 93 % (05/10/22 0731)   Vital Signs (24h Range):  Temp:  [97.2 °F (36.2 °C)-99.4 °F (37.4 °C)] 99.4 °F (37.4 °C)  Pulse:  [66-84] 75  Resp:  [16-22] 18  SpO2:  [93 %-98 %] 93 %  BP: (140-167)/(64-72) 140/64     Weight: 70.7 kg (155 lb 13.8 oz)  Body mass index is 25.94 kg/m².  Body surface area is 1.8 meters squared.    ECOG SCORE           Intake/Output - Last 3 Shifts         05/08 0700  05/09 0659 05/09 0700  05/10 0659 05/10 0700  05/11 0659    P.O. 850 1180     Total Intake(mL/kg) 850 (12.4) 1180 (16.7)     Urine (mL/kg/hr) 2875 (1.7) 1700 (1)     Stool  0     Total Output 2875 1700     Net -2025 -520            Urine Occurrence  1 x     Stool Occurrence  1 x             Physical Exam  Vitals and nursing note reviewed.   Constitutional:       Appearance: She is well-developed.   HENT:      Head: Normocephalic and atraumatic.      Mouth/Throat:      Mouth: Mucous membranes are moist.      Pharynx: No oropharyngeal exudate.   Eyes:      Conjunctiva/sclera: Conjunctivae normal.      Pupils: Pupils are equal, round, and reactive to light.   Cardiovascular:      Rate and Rhythm: Normal rate and regular rhythm.      Heart sounds: Normal heart sounds. No murmur heard.  Pulmonary:      Breath sounds: No wheezing or rales.      Comments: Diminished lung sounds in all fields  Abdominal:      General: Bowel sounds are normal. There is no distension.      Palpations: Abdomen is soft.      Tenderness: There is no abdominal tenderness.    Musculoskeletal:         General: No deformity. Normal range of motion.      Cervical back: Normal range of motion and neck supple.      Comments: Redness to bilat great toes. Improving.   Skin:     General: Skin is warm and dry.      Findings: No erythema or rash.      Comments: Right chest wall port. Dressing c/d/i. No sign of infection to site.   Neurological:      Mental Status: She is alert and oriented to person, place, and time.      Cranial Nerves: No cranial nerve deficit.      Motor: Weakness (generalized weakness) present.   Psychiatric:         Behavior: Behavior normal.         Thought Content: Thought content normal.         Judgment: Judgment normal.       Significant Labs:   CBC:   Recent Labs   Lab 05/09/22  0358 05/10/22  0352   WBC 7.12 6.90   HGB 7.1* 6.7*   HCT 22.3* 21.2*   * 107*      and CMP:   Recent Labs   Lab 05/09/22  0358 05/10/22  0352   * 135*   K 4.0 3.8   CL 92* 94*   CO2 34* 33*   GLU 91 110   BUN 15 20   CREATININE 0.6 0.6   CALCIUM 8.2* 8.4*   PROT 5.2* 5.3*   ALBUMIN 1.8* 1.8*   BILITOT 0.6 0.5   ALKPHOS 87 96   AST 59* 101*   ALT 65* 102*   ANIONGAP 8 8   EGFRNONAA >60.0 >60.0         Diagnostic Results:  I have reviewed and interpreted all pertinent imaging results/findings within the past 24 hours.

## 2022-05-10 NOTE — PLAN OF CARE
Problem: Adult Inpatient Plan of Care  Goal: Plan of Care Review  Outcome: Ongoing, Progressing  Goal: Patient-Specific Goal (Individualized)  Outcome: Ongoing, Progressing  Goal: Absence of Hospital-Acquired Illness or Injury  Outcome: Ongoing, Progressing  Goal: Optimal Comfort and Wellbeing  Outcome: Ongoing, Progressing  Goal: Readiness for Transition of Care  Outcome: Ongoing, Progressing     Problem: Diabetes Comorbidity  Goal: Blood Glucose Level Within Targeted Range  Outcome: Ongoing, Progressing     Problem: Adjustment to Illness (Sepsis/Septic Shock)  Goal: Optimal Coping  Outcome: Ongoing, Progressing     Problem: Bleeding (Sepsis/Septic Shock)  Goal: Absence of Bleeding  Outcome: Ongoing, Progressing     Problem: Glycemic Control Impaired (Sepsis/Septic Shock)  Goal: Blood Glucose Level Within Desired Range  Outcome: Ongoing, Progressing     Problem: Infection Progression (Sepsis/Septic Shock)  Goal: Absence of Infection Signs and Symptoms  Outcome: Ongoing, Progressing     Problem: Nutrition Impaired (Sepsis/Septic Shock)  Goal: Optimal Nutrition Intake  Outcome: Ongoing, Progressing     Problem: Fluid and Electrolyte Imbalance (Acute Kidney Injury/Impairment)  Goal: Fluid and Electrolyte Balance  Outcome: Ongoing, Progressing     Problem: Oral Intake Inadequate (Acute Kidney Injury/Impairment)  Goal: Optimal Nutrition Intake  Outcome: Ongoing, Progressing     Problem: Renal Function Impairment (Acute Kidney Injury/Impairment)  Goal: Effective Renal Function  Outcome: Ongoing, Progressing     Problem: Fluid Imbalance (Pneumonia)  Goal: Fluid Balance  Outcome: Ongoing, Progressing     Problem: Infection (Pneumonia)  Goal: Resolution of Infection Signs and Symptoms  Outcome: Ongoing, Progressing     Problem: Respiratory Compromise (Pneumonia)  Goal: Effective Oxygenation and Ventilation  Outcome: Ongoing, Progressing     Problem: Infection  Goal: Absence of Infection Signs and Symptoms  Outcome:  Ongoing, Progressing     Problem: Skin Injury Risk Increased  Goal: Skin Health and Integrity  Outcome: Ongoing, Progressing     Problem: Impaired Wound Healing  Goal: Optimal Wound Healing  Outcome: Ongoing, Progressing     Problem: Fall Injury Risk  Goal: Absence of Fall and Fall-Related Injury  Outcome: Ongoing, Progressing

## 2022-05-10 NOTE — NURSING
Patient AOX4, calm and cooperative, complains of multiple loose stools, MD made aware, new orders note, contact precautions in place, stool sample needed, position changes encouraged, incontinent of bowel, purewick in place draining aliyah urine to canister, O2@ 4L/NC in use, sats 92-95%, denies pain/discomfort, no distress, call light and belongings in reach, bed in lowest position, will continue to monitor.

## 2022-05-11 NOTE — SEDATION DOCUMENTATION
Specimens obtained during Bronchoscopy:  BAL  ml nacl in 45 ml return, Brush RUL cyto.  Verbal report given to chapo  to include documentation charted in procedural sedation documentation.  Patient to be NPO 1 hour post procedure and place in PO tolerance at 0945.  Moderate concious sedation was performed and cardiorespiratory functions were monitored the entire procedure by Yaneth Ureña RN.  Sedation began at 0831  and concluded at 0900.  The patient tolerated the procedure well.  Yaneth Ureña RN

## 2022-05-11 NOTE — SEDATION DOCUMENTATION
H and P updated-inpatient, patient placed on cardiac monitor-VSS, anesthesia Plan:  Conscious sedation, ASA verified-yes, Airway exam performed-yes, Personal or Family history of anesthesia complications-No  Consent signed and witnessed, Yaneth Ureña RN

## 2022-05-11 NOTE — ASSESSMENT & PLAN NOTE
- At home regimen: lisinopril/hctz and coreg  - SBP 170s-180s. Increased Coreg dose to 25mg with minimal improveemt  - Discussed with cardiology who recommended switching HCTZ to Chlorthalidone 25mg daily and adding Nifedipine XL 60mg with outpatient follow up. Nifedipine was held d/t hypotension and to allow for some permissible hypertension given clinical status  - Current anti-HTN regimen: Lisinopril 40mg daily, Coreg 25mg BID, Chlorthalidone 25mg daily. Nifedipine XL 30mg daily resumed 5/11 due to persistent HTN.  - Will need cardiology f/u outpatient

## 2022-05-11 NOTE — ASSESSMENT & PLAN NOTE
- reports difficulty with ambulation due to pain and weakness  - PT/OT consulted. Recommending SNF. Patient has been accepted SNF. Transfer pending bronch results.  - Boost with meals for nutritional support

## 2022-05-11 NOTE — PLAN OF CARE
Broncoscopy done this AM. Pt tolerated well. Diabetic diet, appetite fair. ACHS, no coverage required. No BM this shift. Pt voids via purwick. 3L O2 sat 93%. POC reviewed with patient; understanding verbalized. Pt. with nonskid footwear on, bed in lowest position, and locked with bed rails up x2.  Pt worked with PT today, sat up at the side of the bed. Pt. has call light and personal items within reach. Patient turned q2 throughout shift. VSS and afebrile this shift. All questions and concerns addressed at this time. Will continue to monitor.

## 2022-05-11 NOTE — PLAN OF CARE
Pt alert and oriented x4. Maintaining good saturation on 02 2L NC. NPO after midnight in prep for bronchoscopy later this AM. Pt had large volume of urine. No BM so no stool for C-diff. Pt remains on contact precaution. Medicated earlier in the shift with Tramadol for c/o pain to lower extremities. Tramadol effective. Pt turned and repositioned intermittently. Safety precaution in place.

## 2022-05-11 NOTE — SUBJECTIVE & OBJECTIVE
Subjective:     Interval History: S/p bronch this morning, awaiting results/pulm recommendations. Hypercapnic and likely related to underlying respiratory condition, will continue to monitor. Continues on 4L O2. Hgb improved to 8.4 today following transfusion yesterday. LFTs downtrending. BP meds held this AM d/t NPO, overall BP up trending and may need to re-start ProcardiaXL this evening; will trend vitals. C Diff study cancelled as no Bms overnight to collect sample. Appetite remains poor, but drinking boost shakes. Otherwise, pt is feeling relatively unchanged w/o complaints.    Objective:     Vital Signs (Most Recent):  Temp: 97.8 °F (36.6 °C) (05/11/22 1133)  Pulse: 61 (05/11/22 1300)  Resp: 16 (05/11/22 1300)  BP: (!) 149/68 (05/11/22 1133)  SpO2: 96 % (05/11/22 1300)   Vital Signs (24h Range):  Temp:  [97.5 °F (36.4 °C)-98.2 °F (36.8 °C)] 97.8 °F (36.6 °C)  Pulse:  [61-87] 61  Resp:  [11-20] 16  SpO2:  [89 %-98 %] 96 %  BP: (149-177)/(64-77) 149/68     Weight: 68.8 kg (151 lb 10.8 oz)  Body mass index is 25.24 kg/m².  Body surface area is 1.78 meters squared.    ECOG SCORE             Intake/Output - Last 3 Shifts         05/09 0700  05/10 0659 05/10 0700  05/11 0659 05/11 0700  05/12 0659    P.O. 1180 860     Blood  570     IV Piggyback  100     Total Intake(mL/kg) 1180 (16.7) 1530 (22.2)     Urine (mL/kg/hr) 1700 (1) 1950 (1.2) 450 (0.9)    Stool 0      Total Output 1700 1950 450    Net -520 -420 -450           Urine Occurrence 1 x  1 x    Stool Occurrence 1 x 4 x             Physical Exam  Vitals and nursing note reviewed.   Constitutional:       Appearance: She is well-developed.   HENT:      Head: Normocephalic and atraumatic.      Mouth/Throat:      Mouth: Mucous membranes are moist.      Pharynx: No oropharyngeal exudate.   Eyes:      Conjunctiva/sclera: Conjunctivae normal.      Pupils: Pupils are equal, round, and reactive to light.   Cardiovascular:      Rate and Rhythm: Normal rate and regular  rhythm.      Heart sounds: Normal heart sounds. No murmur heard.  Pulmonary:      Breath sounds: No wheezing or rales.      Comments: Diminished lung sounds in all fields  Abdominal:      General: Bowel sounds are normal. There is no distension.      Palpations: Abdomen is soft.      Tenderness: There is no abdominal tenderness.   Musculoskeletal:         General: No deformity. Normal range of motion.      Cervical back: Normal range of motion and neck supple.      Comments: Redness to bilat great toes. Improving.   Skin:     General: Skin is warm and dry.      Findings: No erythema or rash.      Comments: Right chest wall port. Dressing c/d/i. No sign of infection to site.   Neurological:      Mental Status: She is alert and oriented to person, place, and time.      Cranial Nerves: No cranial nerve deficit.      Motor: Weakness (generalized weakness) present.   Psychiatric:         Behavior: Behavior normal.         Thought Content: Thought content normal.         Judgment: Judgment normal.       Significant Labs:   CBC:   Recent Labs   Lab 05/10/22  0352 05/11/22  0503   WBC 6.90 7.96   HGB 6.7* 8.4*   HCT 21.2* 26.2*   * 119*    and CMP:   Recent Labs   Lab 05/10/22  0352 05/11/22  0503   * 134*   K 3.8 3.5   CL 94* 89*   CO2 33* 35*    111*   BUN 20 26*   CREATININE 0.6 0.7   CALCIUM 8.4* 9.1   PROT 5.3* 5.8*   ALBUMIN 1.8* 2.0*   BILITOT 0.5 0.6   ALKPHOS 96 94   * 59*   * 87*   ANIONGAP 8 10   EGFRNONAA >60.0 >60.0       Diagnostic Results:  None

## 2022-05-11 NOTE — PT/OT/SLP PROGRESS
Physical Therapy Treatment    Patient Name:  Susan Puente   MRN:  [id number]    Recommendations:     Discharge Recommendations:  nursing facility, skilled   Discharge Equipment Recommendations:  (tbd)   Barriers to discharge: Inaccessible home and Decreased caregiver support    Assessment:     Susan Puente is a 71 y.o. female admitted with a medical diagnosis of Acute gout of foot.  She presents with the following impairments/functional limitations:  weakness, impaired endurance, impaired self care skills, impaired functional mobilty, gait instability, impaired balance, pain, decreased ROM, decreased lower extremity function. Susan Puente would benefit from acute PT intervention to address listed functional deficits, provide patient/caregiver education, reduce fall risk, and maximize (I) and safety with functional mobility.    Pt presents with significant functional mobility deficits and is functioning below baseline. Pt continues to be limited by significant BLE pain 2/2 gout, but demonstrates improved functional mobility on this date. Able to perform supine to sit transfer with SBA-CGA, and required Patricia for RLE mgmt to perform sit to supine transfer. Pt also performed anterior and lateral scooting activities with Patricia from PT. Pt eager to mvoe onto more functional mobility. ALIE Reyna informed; will assist Pt with eating meals EOB. Pt will continue to benefit from acute PT services in order to maximize safety and (I) with functional mobility.     After hospital discharge, pt would benefit from SNF to continue addressing therapy impairments.    Rehab Prognosis: Good; patient would benefit from acute skilled PT services to address these deficits and reach maximum level of function.      Plan:     During this hospitalization, patient to be seen 3 x/week to address the identified rehab impairments via gait training, therapeutic activities, therapeutic exercises, neuromuscular re-education and  progress toward the following goals:    · Plan of Care Expires:  05/30/22    This plan of care has been discussed with the patient, who was included in its development and is in agreement with the identified goals and treatment plan.     Subjective     Communicated with RN prior to session.  Patient agreeable to participate.     Chief Complaint: none  Patient/Family Comments/goals: to stand up and walk  Pt pain prior to session: did not rate; indicated in R knee  Pt pain following session:did not rate;  indicated in R knee    Objective:     Patient found HOB elevated with pulse ox (continuous), peripheral IV, oxygen, telemetry, PureWick  upon PT entry to room.    General Precautions: Standard, fall   Orthopedic Precautions:N/A   Braces:     Functional Mobility:    Bed Mobility:  · Supine to Sit: Stand-by Assistance and Contact Guard Assistance   · Performed 3 trials for practice  · Sit to Supine: Minimal Assistance   · Performed 3 trials  · Assist for RLE mgmt  · Rolling R: Stand-by Assistance  · Scooting anteriorly to EOB to plant feet on floor: Stand-by Assistance  · Scooting laterally along EOB: Pt performed lateral scooting with Patricia from PT  · PT also providing verbal and tactile cues for LE limb advancement prior to weight shifting.   · Cues for anterior weight shifting to un-weight buttocks in order to scoot laterally along EOB    Transfers:   · Sit to Stand Transfer: Minimal Assistance  from EOB with HHA x 2  · Performed 3x PARTIAL stands; just enough to clear buttocks but not fully standing 2/2 pain.              Gait:  · Patient continues to be nonambulatory    Balance:  · Static Sit: Supervision at EOB x ~30 minutes      Therapeutic Activities/Exercises     Pt educated on importance of daily EOB mobility to improve functional mobility.   Pt demonstrated understanding of HEP; has been adherent to HEP performance since previous PT visit.     Patient educated on the importance of early mobility to prevent  functional decline during hospital stay    Patient was instructed to utilize staff assistance for mobility/transfers.  Patient was educated on PT POC and all questions answered within PT scope of practice.    Patient able to verbalize understanding; will follow-up with pt during current admit for additional questions/concerns within scope of practice.     AM-PAC 6 CLICK MOBILITY  Total Score:12     Patient left HOB elevated with all lines intact, call button in reach and RN notified.        History/Goals:     PAST MEDICAL HISTORY:  Past Medical History:   Diagnosis Date    Allergic rhinitis     Allergy     Anemia     Anxiety     CAD (coronary artery disease)     Carotid stenosis     Colon polyps 2016    Controlled type 2 diabetes mellitus without complication, without long-term current use of insulin 10/19/2016    Depression     GERD (gastroesophageal reflux disease)     Hearing loss in right ear     Hx of psychiatric care     Celexa, Prozac    Hypokalemia 2/3/2020    MDS (myelodysplastic syndrome) with 5q deletion     Psychiatric problem     Therapy        Past Surgical History:   Procedure Laterality Date    BONE MARROW BIOPSY Left 6/7/2018    Procedure: Biopsy-bone marrow;  Surgeon: Gita Valdes MD;  [facility];  Service: Oncology;  Laterality: Left;    BONE MARROW BIOPSY Left 3/21/2019    Procedure: Biopsy-bone marrow;  Surgeon: Gita Valdes MD;  [facility];  Service: Oncology;  Laterality: Left;    BONE MARROW BIOPSY Left 10/24/2019    Procedure: Biopsy-bone marrow;  Surgeon: Gita Valdes MD;  [facility];  Service: Oncology;  Laterality: Left;  left iliac crest    BONE MARROW BIOPSY Left 4/21/2021    Procedure: Biopsy-bone marrow;  Surgeon: Gita Valdes MD;  Location: [facility] ENDO (4TH FLR);  Service: Oncology;  Laterality: Left;    BUNIONECTOMY      CAROTID ENDARTERECTOMY Left 2011    CAROTID STENT      COLONOSCOPY N/A 12/20/2016    Procedure:  COLONOSCOPY;  Surgeon: PATRICIA Simpson MD;  Location: [facility] (4TH FLR);  Service: Endoscopy;  Laterality: N/A;  pt has vomiting with anesthesia in past    ENDOMETRIAL ABLATION      for endometriosis    INSERTION OF TUNNELED CENTRAL VENOUS CATHETER (CVC) WITH SUBCUTANEOUS PORT N/A 2020    Procedure: [id number];  Surgeon: Deepa Diagnostic Provider;  Location: [facility] OR 2ND FLR;  Service: Radiology;  Laterality: N/A;  189    TONSILLECTOMY      TYMPANOSTOMY TUBE PLACEMENT         GOALS:   Multidisciplinary Problems     Physical Therapy Goals        Problem: Physical Therapy    Goal Priority Disciplines Outcome Goal Variances Interventions   Physical Therapy Goal     PT, PT/OT Ongoing, Progressing     Description: Goals to be met by: 22, updated on 22    Patient will increase functional independence with mobility by performin. Supine to sit with modified independence  2. Sit to supine with modified independence  3. Sit to stand transfer with supervision  4. Bed to chair transfer with contact guard assistance using LRAD as needed  5. Gait  x 10 feet with minimum assistance using LRAD as needed  6. Ascend/descend 1 flight of stair with appropriate handrails minimum assistance using LRAD as needed  7. Lower extremity exercise program x10 reps per handout, with IND                     Time Tracking:     PT Received On: 22  PT Start Time: 1425     PT Stop Time: 1521  PT Total Time (min): 56 min     Billable Minutes: Therapeutic Activity 15, Therapeutic Exercise 10 and Neuromuscular Re-education 30      Isacc Mccollum, PT  2022

## 2022-05-11 NOTE — ASSESSMENT & PLAN NOTE
Patient admitted with acute hypoxic respiratory failure, she was treated for PNA during recent admission and was initiated on home oxygen after 6 min walk test previous admission.  - CXR with pulmonary consolidation and fever on admission, recently completed levaquin/azithro course for CAP.   - Started on Cefepime on admission, further infectious work up unrevealing and afebrile >48 hours and Cefepime discontinued 5/3. Given increasing O2 requirements over the weekend with persistent RML consolidation, Cefepime resumed 5/8.   - Continue scheduled DuoNebs  - 5/6 CT Chest: Increased RML Consolidation  - 5/7 CT-A: Negative for PE; ABG with hypoxemia.   - 5/9: Pulmonary consulted given increasing O2 requirements.   - S/p Bronch 5/11 AM. AFB/KOH staining was negative. Remaining cultures/studies pending. Awaiting further pulm recommendations.

## 2022-05-11 NOTE — PROGRESS NOTES
[facility] - Oncology ([facility])  Hematology  Bone Marrow Transplant  Progress Note    Patient Name: Susan Puente  Admission Date: 4/29/2022  Hospital Length of Stay: 9 days  Code Status: Full Code    Subjective:     Interval History: S/p bronch this morning, awaiting results/pulm recommendations. Hypercapnic and likely related to underlying respiratory condition, will continue to monitor. Continues on 4L O2. Hgb improved to 8.4 today following transfusion yesterday. LFTs downtrending. BP meds held this AM d/t NPO, overall BP up trending and may need to re-start ProcardiaXL this evening; will trend vitals. C Diff study cancelled as no Bms overnight to collect sample. Appetite remains poor, but drinking boost shakes. Otherwise, pt is feeling relatively unchanged w/o complaints.    Objective:     Vital Signs (Most Recent):  Temp: 97.8 °F (36.6 °C) (05/11/22 1133)  Pulse: 61 (05/11/22 1300)  Resp: 16 (05/11/22 1300)  BP: (!) 149/68 (05/11/22 1133)  SpO2: 96 % (05/11/22 1300)   Vital Signs (24h Range):  Temp:  [97.5 °F (36.4 °C)-98.2 °F (36.8 °C)] 97.8 °F (36.6 °C)  Pulse:  [61-87] 61  Resp:  [11-20] 16  SpO2:  [89 %-98 %] 96 %  BP: (149-177)/(64-77) 149/68     Weight: 68.8 kg (151 lb 10.8 oz)  Body mass index is 25.24 kg/m².  Body surface area is 1.78 meters squared.    ECOG SCORE             Intake/Output - Last 3 Shifts         05/09 0700  05/10 0659 05/10 0700  05/11 0659 05/11 0700 05/12 0659    P.O. 1180 860     Blood  570     IV Piggyback  100     Total Intake(mL/kg) 1180 (16.7) 1530 (22.2)     Urine (mL/kg/hr) 1700 (1) 1950 (1.2) 450 (0.9)    Stool 0      Total Output 1700 1950 450    Net -520 -420 -450           Urine Occurrence 1 x  1 x    Stool Occurrence 1 x 4 x             Physical Exam  Vitals and nursing note reviewed.   Constitutional:       Appearance: She is well-developed.   HENT:      Head: Normocephalic and atraumatic.      Mouth/Throat:      Mouth: Mucous membranes are moist.      Pharynx:  No oropharyngeal exudate.   Eyes:      Conjunctiva/sclera: Conjunctivae normal.      Pupils: Pupils are equal, round, and reactive to light.   Cardiovascular:      Rate and Rhythm: Normal rate and regular rhythm.      Heart sounds: Normal heart sounds. No murmur heard.  Pulmonary:      Breath sounds: No wheezing or rales.      Comments: Diminished lung sounds in all fields  Abdominal:      General: Bowel sounds are normal. There is no distension.      Palpations: Abdomen is soft.      Tenderness: There is no abdominal tenderness.   Musculoskeletal:         General: No deformity. Normal range of motion.      Cervical back: Normal range of motion and neck supple.      Comments: Redness to bilat great toes. Improving.   Skin:     General: Skin is warm and dry.      Findings: No erythema or rash.      Comments: Right chest wall port. Dressing c/d/i. No sign of infection to site.   Neurological:      Mental Status: She is alert and oriented to person, place, and time.      Cranial Nerves: No cranial nerve deficit.      Motor: Weakness (generalized weakness) present.   Psychiatric:         Behavior: Behavior normal.         Thought Content: Thought content normal.         Judgment: Judgment normal.       Significant Labs:   CBC:   Recent Labs   Lab 05/10/22  0352 05/11/22  0503   WBC 6.90 7.96   HGB 6.7* 8.4*   HCT 21.2* 26.2*   * 119*    and CMP:   Recent Labs   Lab 05/10/22  0352 05/11/22  0503   * 134*   K 3.8 3.5   CL 94* 89*   CO2 33* 35*    111*   BUN 20 26*   CREATININE 0.6 0.7   CALCIUM 8.4* 9.1   PROT 5.3* 5.8*   ALBUMIN 1.8* 2.0*   BILITOT 0.5 0.6   ALKPHOS 96 94   * 59*   * 87*   ANIONGAP 8 10   EGFRNONAA >60.0 >60.0       Diagnostic Results:  None    Assessment/Plan:     * Acute gout of foot  - patient reports difficulty bearing weight on bilat feet due to pain, improving  - x-ray showing soft tissue edema suggestive of gout flare  - uric acid wnl  - allopurinol 100 mg daily  for prevention  - cooling muscle cream PRN for additional pain support     Acute respiratory failure with hypoxia  Patient admitted with acute hypoxic respiratory failure, she was treated for PNA during recent admission and was initiated on home oxygen after 6 min walk test previous admission.  - CXR with pulmonary consolidation and fever on admission, recently completed levaquin/azithro course for CAP.   - Started on Cefepime on admission, further infectious work up unrevealing and afebrile >48 hours and Cefepime discontinued 5/3. Given increasing O2 requirements over the weekend with persistent RML consolidation, Cefepime resumed 5/8.   - Continue scheduled DuoNebs  - 5/6 CT Chest: Increased RML Consolidation  - 5/7 CT-A: Negative for PE; ABG with hypoxemia.   - 5/9: Pulmonary consulted given increasing O2 requirements.   - S/p Bronch 5/11 AM. AFB/KOH staining was negative. Remaining cultures/studies pending. Awaiting further pulm recommendations.     MDS (myelodysplastic syndrome)  - Primary oncologist, Dr. 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 Developed allergy and was then desensitized. Soon after developed pancytopenia and Revlimid was stopped June 2017.    - BMBX on 6/8/17 was with normal cytogenetics. 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 Of 20 metaphases, 5 were normal and 15 had a 5q deletion, NGS positive for SF3B1 and TP53 (12%). 1.4% blasts  - Received vidaza for 22 cycles, received  Decitabine for C23 due to national shortage of vidaza and then back to vidaza with C24.  - completed cycle 39 on 4/8/22  - due for cycle 40 on 05/02/2022, postponed whiled admitted and to be readdressed as outpatient     Essential hypertension  - At home regimen: lisinopril/hctz and coreg  - SBP 170s-180s. Increased Coreg dose to 25mg with minimal improveemt  - Discussed with cardiology who recommended switching HCTZ to Chlorthalidone 25mg daily and adding Nifedipine XL 60mg with outpatient follow up. Nifedipine was held d/t hypotension and to allow for some permissible hypertension given clinical status  - Current anti-HTN regimen: Lisinopril 40mg daily, Coreg 25mg BID, Chlorthalidone 25mg daily. Nifedipine XL 30mg daily resumed 5/11 due to persistent HTN.  - Will need cardiology f/u outpatient    Hyponatremia  - Suspect insetting of hypovolemia  - Mild, Na 134 today. Improved on IVF    Physical debility  - reports difficulty with ambulation due to pain and weakness  - PT/OT consulted. Recommending SNF. Patient has been accepted SNF. Transfer pending bronch results.  - Boost with meals for nutritional support    Pancytopenia due to antineoplastic chemotherapy  - Transfuse for hgb < 7 or plts < 10K  - Daily cbc while inpatient  - Continue acyclovir and fluconazole ppx  - Will resume cipro ppx when cefepime d/c'd    CAP (community acquired pneumonia)  - Pt with 2 weeks of productive cough, nasal congestion  - Upon arrival to the hospial she was hypoxic requiring 3L NC, weaned to 2L today  - CXR with likely develop consolidation  - CTA shows New patchy consolidative opacities in the in the right upper and posterior right lower lobes.  Findings suggestive infectious etiology such as pneumonia, multifocal pneumonia.  Also consider atypical given patient's reported immunocompromised status.  Aspiration could present similarly.  Consider continued follow-up after acute clinical illness has resolved to ensure resolution.  New mediastinal and bilateral hilar lymphadenopathy, likely reactive.  - completed azithromycin and 7 day course of Levaquin on 4/30.  - CXR on admission showing possible developing consolidation: Cefepime 4/30-5/3    - See acute respiratory failure with hypoxia above    Controlled type 2 diabetes mellitus without complication, without long-term current use of insulin  - Will hold home po diabetic medications  - SSI, ACHS blood glucose monitoring, and diabetic diet  - Goal bg 140-180    Hypokalemia  - K+ 4.0 today  - replacing per prn order set  - daily CMP while inpatient    Insomnia  - Continue nightly melatonin  - Added remeron 5/5    Adjustment disorder with mixed anxiety and depressed mood  - continue home celexa    CAD (coronary artery disease)  - S/P left carotid endarterectomy prior to cancer dx, s/p PCI (3 stents) >20 years ago   - Continue on ASA 81mg daily, platelets are wnl  - On repatha as outpatient (statin intolerant)        VTE Risk Mitigation (From admission, onward)         Ordered     IP VTE HIGH RISK PATIENT  Once         04/29/22 1651     Place sequential compression device  Until discontinued         04/29/22 1651                Disposition: remain inpatient for further pulm evaluation, SNF bed available when appropriate for transfer    Jolanta Guy PA-C  Bone Marrow Transplant  [facility] - Oncology ([facility])

## 2022-05-11 NOTE — PT/OT/SLP PROGRESS
Occupational Therapy Not Seen      Patient Name:  Susan Puente   MRN:  [id number]    Patient not seen today secondary to Pt lethargic after a.m procedure and had not eaten lunch unable to reattempt  . Will follow-up POC.    5/11/2022

## 2022-05-12 NOTE — ASSESSMENT & PLAN NOTE
Patient with improving anxiety, depression, insomnia.    I will follow up with patient outpatient after discharge from SNF.

## 2022-05-12 NOTE — PROGRESS NOTES
[facility] - Oncology ([facility])  Psychology  Progress Note  Individual Psychotherapy (PhD/LCSW)    Patient Name: Susan Puente  MRN: [id number]    Patient Class: IP- Inpatient  Admission Date: 4/29/2022  Hospital Length of Stay: 10 days  Attending Physician: Gita Valdes MD  Primary Care Provider: Claudia Rapp MD    Therapeutic Intervention: Met with patient.  Inpatient/Partial Hospital - Supportive psychotherapy 45 min - CPT Code 88943    Chief Complaint/Reason for Encounter: depression and anxiety     Interval History and Content of Current Session: Patient discussed fears related to food/appetite/weight loss.  She is, overall, optimistic about her ability to get stronger and move toward normalcy.     Risk Parameters:  Patient reports no suicidal ideation  Patient reports no homicidal ideation  Patient reports no self-injurious behavior  Patient reports no violent behavior    Verbal Deficits: None    Patient's response to intervention:  The patient's response to intervention is accepting, motivated.    Progress toward goals and other mental status changes:  The patient's progress toward goals is good.    Diagnostic Impression - Plan:     Adjustment disorder with mixed anxiety and depressed mood  Patient with improving anxiety, depression, insomnia.    I will follow up with patient outpatient after discharge from SNF.            Plan:  individual psychotherapy and medication management by physician    Return to Clinic: after SNF discharge    Length of Service (minutes): 45    Nando Marie, PhD  Psychology  [facility] - Oncology ([facility])

## 2022-05-12 NOTE — ASSESSMENT & PLAN NOTE
- Suspect insetting of hypovolemia  - Mild, Na 130 today. Previously improved on IVF, will resume IVF today given dehydration

## 2022-05-12 NOTE — PT/OT/SLP PROGRESS
Occupational Therapy   Treatment    Name: Susan Puente  MRN: [id number]  Admitting Diagnosis:  Acute gout of foot       Recommendations:     Discharge Recommendations: nursing facility, skilled  Discharge Equipment Recommendations:   (TBD)  Barriers to discharge:  Inaccessible home environment    Assessment:     Susan Puente is a 71 y.o. female with a medical diagnosis of Acute gout of foot.  She presents with pain in BLE on this date referring to knee and foot region. Pt. Required Min A for bed mobility and was able to scoot to EOB with SBA and increased time. Pt. Performed SQPT to bedside chair with Mod A.  Pt. Was able to perform ADL tasks seated EOB with SBA to Min A.  Pt. Cooperative and motivated to improve. . Performance deficits affecting function are weakness, impaired endurance, impaired self care skills, impaired functional mobilty, gait instability, pain, impaired cardiopulmonary response to activity.     Rehab Prognosis:  Good; patient would benefit from acute skilled OT services to address these deficits and reach maximum level of function.       Plan:     Patient to be seen 3 x/week to address the above listed problems via self-care/home management, therapeutic activities, therapeutic exercises, neuromuscular re-education  · Plan of Care Expires: 05/30/22  · Plan of Care Reviewed with: patient    Subjective   Pt. Reported she would be glad to get up in the chair.   Pain/Comfort:  · Pain Rating 1: 5/10  · Location - Side 1: Bilateral  · Location 1: knee  · Pain Addressed 1: Reposition, Distraction  · Pain Rating Post-Intervention 1: 5/10    Objective:     Communicated with: nurse prior to session.  Patient found supine with telemetry, PureWick, pulse ox (continuous), oxygen upon OT entry to room.    General Precautions: Standard, fall, respiratory   Orthopedic Precautions:N/A   Braces: N/A  Respiratory Status: Nasal cannula, flow 3 L/min     Occupational Performance:     Bed Mobility:     · Patient completed Supine to Sit with minimum assistance with assist at trunk    Functional Mobility/Transfers:  · Patient completed Sit <> Stand Transfer with maximal assistance  with  no assistive device   · Patient completed Bed <> Chair Transfer using Squat Pivot technique with moderate assistance with no assistive device  · Functional Mobility: not tested    Activities of Daily Living:  · Grooming: stand by assistance seated to rinse mouth, comb hair and wash face seated EOB  · Upper Body Dressing: minimum assistance to guide gown completely around back      AMPA 6 Click ADL: 18    Treatment & Education:  Pt. educated on importance of OOB/therapy  Pt. Educated on need for staff assist for all transfers    Patient left up in chair with all lines intact, call button in reach and nurse notifiedEducation:      GOALS:   Multidisciplinary Problems     Occupational Therapy Goals        Problem: Occupational Therapy    Goal Priority Disciplines Outcome Interventions   Occupational Therapy Goal     OT, PT/OT Ongoing, Progressing    Description: Goals to be met by: 5/14/22     Patient will increase functional independence with ADLs by performing:    LE Dressing with Set-up Assistance.  Grooming while standing at sink with Stand-by Assistance.  Toileting from toilet with Minimal Assistance for hygiene and clothing management.   Toilet transfer to toilet with Stand-by Assistance.  Functional mobility of household and community distance with  minimum assistance and AD as needed                       Time Tracking:     OT Date of Treatment: 05/12/22  OT Start Time: 0856  OT Stop Time: 0919  OT Total Time (min): 23 min    Billable Minutes:Self Care/Home Management 23    OT/ADRYAN: OT          5/12/2022

## 2022-05-12 NOTE — PROGRESS NOTES
[facility] Cordova - Oncology ([facility])  Hematology  Bone Marrow Transplant  Progress Note    Patient Name: Susan Puente  Admission Date: 4/29/2022  Hospital Length of Stay: 10 days  Code Status: Full Code    Subjective:     Interval History: S/p bronch yesterday, pending pulmonary recommendations. Coughing up mucus today, adding mucinex. Continues to require 3-4L O2. Hgb 7.5 today. Appetite improving and trying to take in more PO, still not drinking fluids well, may need IVF today. LFTs downtrend/stable. Afebrile, discontinuing cefepime. BP improved following addition of nifedipine. Otherwise, pt reports she is feeling improved today.     Objective:     Vital Signs (Most Recent):  Temp: 97.3 °F (36.3 °C) (05/12/22 0700)  Pulse: 69 (05/12/22 0809)  Resp: 16 (05/12/22 0933)  BP: (!) 146/67 (05/12/22 0700)  SpO2: 95 % (05/12/22 0809)   Vital Signs (24h Range):  Temp:  [97.3 °F (36.3 °C)-98.2 °F (36.8 °C)] 97.3 °F (36.3 °C)  Pulse:  [61-88] 69  Resp:  [16-18] 16  SpO2:  [89 %-96 %] 95 %  BP: (105-149)/(58-72) 146/67     Weight: 69.8 kg (153 lb 14.1 oz)  Body mass index is 25.61 kg/m².  Body surface area is 1.79 meters squared.    ECOG SCORE             Intake/Output - Last 3 Shifts         05/10 0700 05/11 0659 05/11 0700 05/12 0659 05/12 0700 05/13 0659    P.O. 860 990     Blood 570      IV Piggyback 100      Total Intake(mL/kg) 1530 (22.2) 990 (14.2)     Urine (mL/kg/hr) 1950 (1.2) 1600 (1) 300 (1.1)    Stool       Total Output 1950 1600 300    Net -420 -610 -300           Urine Occurrence  1 x     Stool Occurrence 4 x              Physical Exam  Vitals and nursing note reviewed.   Constitutional:       General: She is not in acute distress.     Appearance: She is well-developed. She is ill-appearing. She is not toxic-appearing.   HENT:      Head: Normocephalic and atraumatic.      Mouth/Throat:      Mouth: Mucous membranes are dry.      Pharynx: No oropharyngeal exudate.   Eyes:      Conjunctiva/sclera:  Conjunctivae normal.      Pupils: Pupils are equal, round, and reactive to light.   Cardiovascular:      Rate and Rhythm: Normal rate and regular rhythm.      Heart sounds: Normal heart sounds. No murmur heard.  Pulmonary:      Breath sounds: No wheezing or rales.      Comments: Diminished lung sounds in all fields  Abdominal:      General: Bowel sounds are normal. There is no distension.      Palpations: Abdomen is soft.      Tenderness: There is no abdominal tenderness.   Musculoskeletal:         General: No deformity. Normal range of motion.      Cervical back: Normal range of motion and neck supple.      Comments: Redness to bilat great toes. Improving.   Skin:     General: Skin is warm and dry.      Findings: No erythema or rash.      Comments: Right chest wall port. Dressing c/d/i. No sign of infection to site.   Neurological:      Mental Status: She is alert and oriented to person, place, and time.      Cranial Nerves: No cranial nerve deficit.      Motor: Weakness (generalized weakness) present.   Psychiatric:         Behavior: Behavior normal.         Thought Content: Thought content normal.         Judgment: Judgment normal.       Significant Labs:   CBC:   Recent Labs   Lab 05/11/22  0503 05/12/22  0343   WBC 7.96 8.09   HGB 8.4* 7.5*   HCT 26.2* 23.1*   * 112*    and CMP:   Recent Labs   Lab 05/11/22  0503 05/12/22  0343   * 130*   K 3.5 3.3*   CL 89* 90*   CO2 35* 32*   * 101   BUN 26* 26*   CREATININE 0.7 0.7   CALCIUM 9.1 8.5*   PROT 5.8* 5.5*   ALBUMIN 2.0* 1.9*   BILITOT 0.6 0.6   ALKPHOS 94 86   AST 59* 68*   ALT 87* 81*   ANIONGAP 10 8   EGFRNONAA >60.0 >60.0       Diagnostic Results:  None    Assessment/Plan:     * Acute gout of foot  - patient reports difficulty bearing weight on bilat feet due to pain, improving  - x-ray showing soft tissue edema suggestive of gout flare  - uric acid wnl  - allopurinol 100 mg daily for prevention  - cooling muscle cream PRN for additional  pain support     Acute respiratory failure with hypoxia  Patient admitted with acute hypoxic respiratory failure, she was treated for PNA during recent admission and was initiated on home oxygen after 6 min walk test previous admission.  - CXR with pulmonary consolidation and fever on admission, recently completed levaquin/azithro course for CAP.   - Started on Cefepime on admission, further infectious work up unrevealing and afebrile >48 hours and Cefepime discontinued 5/3. Given increasing O2 requirements over the weekend with persistent RML consolidation, Cefepime resumed 5/8.   - Continue scheduled DuoNebs  - 5/6 CT Chest: Increased RML Consolidation  - 5/7 CT-A: Negative for PE; ABG with hypoxemia.   - 5/9: Pulmonary consulted given increasing O2 requirements.   - S/p Bronch 5/11 AM. AFB/KOH staining was negative. Gram stain negative. Remaining cultures/studies pending. Awaiting further pulm recommendations.     MDS (myelodysplastic syndrome)  - Primary oncologist, Dr. 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 Developed allergy and was then desensitized. Soon after developed pancytopenia and Revlimid was stopped June 2017.    - BMBX on 6/8/17 was with normal cytogenetics. 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 Of 20 metaphases, 5 were normal and 15 had a 5q deletion, NGS positive for SF3B1 and TP53 (12%). 1.4% blasts  - Received vidaza for 22 cycles, received Decitabine for C23 due to national  shortage of vidaza and then back to vidaza with C24.  - completed cycle 39 on 4/8/22  - due for cycle 40 on 05/02/2022, postponed whiled admitted and to be readdressed as outpatient     Essential hypertension  - At home regimen: lisinopril/hctz and coreg  - SBP 170s-180s. Increased Coreg dose to 25mg with minimal improveemt  - Discussed with cardiology who recommended switching HCTZ to Chlorthalidone 25mg daily and adding Nifedipine XL 60mg with outpatient follow up. Nifedipine was held d/t hypotension and to allow for some permissible hypertension given clinical status  - Current anti-HTN regimen: Lisinopril 40mg daily, Coreg 25mg BID, Chlorthalidone 25mg daily. Nifedipine XL 30mg daily resumed 5/11 due to persistent HTN.  - Will need cardiology f/u outpatient    Hyponatremia  - Suspect insetting of hypovolemia  - Mild, Na 130 today. Previously improved on IVF, will resume IVF today given dehydration    Physical debility  - reports difficulty with ambulation due to pain and weakness  - PT/OT consulted. Recommending SNF. Patient has been accepted SNF. Transfer pending bronch results.  - Boost with meals for nutritional support    Pancytopenia due to antineoplastic chemotherapy  - Transfuse for hgb < 7 or plts < 10K  - Daily cbc while inpatient  - Continue acyclovir ppx  - Cefepime and fluconazole d/c'd 5/12 given normal WBC and afebrile     CAP (community acquired pneumonia)  - Pt with 2 weeks of productive cough, nasal congestion  - Upon arrival to the hospial she was hypoxic requiring 3L NC, weaned to 2L today  - CXR with likely develop consolidation  - CTA shows New patchy consolidative opacities in the in the right upper and posterior right lower lobes.  Findings suggestive infectious etiology such as pneumonia, multifocal pneumonia.  Also consider atypical given patient's reported immunocompromised status.  Aspiration could present similarly.  Consider continued follow-up after acute clinical illness has  resolved to ensure resolution. New mediastinal and bilateral hilar lymphadenopathy, likely reactive.  - completed azithromycin and 7 day course of Levaquin on 4/30.  - CXR on admission showing possible developing consolidation: Cefepime 4/30-5/3    - See acute respiratory failure with hypoxia above    Controlled type 2 diabetes mellitus without complication, without long-term current use of insulin  - Will hold home po diabetic medications  - SSI, ACHS blood glucose monitoring, and diabetic diet  - Goal bg 140-180    Hypokalemia  - K+ 4.0 today  - replacing per prn order set  - daily CMP while inpatient    Insomnia  - Continue nightly melatonin  - Added remeron 5/5    Adjustment disorder with mixed anxiety and depressed mood  - continue home celexa    CAD (coronary artery disease)  - S/P left carotid endarterectomy prior to cancer dx, s/p PCI (3 stents) >20 years ago   - Continue on ASA 81mg daily, platelets are wnl  - On repatha as outpatient (statin intolerant)        VTE Risk Mitigation (From admission, onward)         Ordered     IP VTE HIGH RISK PATIENT  Once         04/29/22 1651     Place sequential compression device  Until discontinued         04/29/22 1651                Disposition: Remain inpatient    Jolanta Guy PA-C  Bone Marrow Transplant  [facility] - Oncology ([facility])

## 2022-05-12 NOTE — PLAN OF CARE
PRN pain med given x1. Pt up in chair during shift. ACHS, no coverage required. 3L NC, 93%. Diabetic diet, appetite increased today. No BM this shift. Pt voids via purwick. POC reviewed with patient; understanding verbalized. Pt. with nonskid footwear on, bed in lowest position, and locked with bed rails up x2.  Pt. instructed to call prior to getting OOB.  Pt. has call light and personal items within reach. VSS and afebrile this shift. All questions and concerns addressed at this time. Will continue to monitor.

## 2022-05-12 NOTE — ASSESSMENT & PLAN NOTE
- Transfuse for hgb < 7 or plts < 10K  - Daily cbc while inpatient  - Continue acyclovir ppx  - Cefepime and fluconazole d/c'd 5/12 given normal WBC and afebrile

## 2022-05-12 NOTE — SUBJECTIVE & OBJECTIVE
Therapeutic Intervention: Met with patient.  Inpatient/Partial Hospital - Supportive psychotherapy 45 min - CPT Code 00240    Chief Complaint/Reason for Encounter: depression and anxiety     Interval History and Content of Current Session: Patient discussed fears related to food/appetite/weight loss.  She is, overall, optimistic about her ability to get stronger and move toward normalcy.     Risk Parameters:  Patient reports no suicidal ideation  Patient reports no homicidal ideation  Patient reports no self-injurious behavior  Patient reports no violent behavior    Verbal Deficits: None    Patient's response to intervention:  The patient's response to intervention is accepting, motivated.    Progress toward goals and other mental status changes:  The patient's progress toward goals is good.

## 2022-05-12 NOTE — PLAN OF CARE
Saturation 88%-94% on 02 3L NC. Medicated with PRN Tramadol for c/o pain to joints and lower extremities. Pain med was effective. Afebrile. Other vital signs stable. Voiding moderate clear aliyah urine. Positive bowel sounds present and abdomen soft, non-tender, no BM this shift. Turned and repositioned intermittently. Safety precaution in place. Continue to monitor.

## 2022-05-12 NOTE — SUBJECTIVE & OBJECTIVE
Subjective:     Interval History: S/p bronch yesterday, pending pulmonary recommendations. Coughing up mucus today, adding mucinex. Continues to require 3-4L O2. Hgb 7.5 today. Appetite improving and trying to take in more PO, still not drinking fluids well, may need IVF today. LFTs downtrend/stable. Afebrile, discontinuing cefepime. BP improved following addition of nifedipine. Otherwise, pt reports she is feeling improved today.     Objective:     Vital Signs (Most Recent):  Temp: 97.3 °F (36.3 °C) (05/12/22 0700)  Pulse: 69 (05/12/22 0809)  Resp: 16 (05/12/22 0933)  BP: (!) 146/67 (05/12/22 0700)  SpO2: 95 % (05/12/22 0809)   Vital Signs (24h Range):  Temp:  [97.3 °F (36.3 °C)-98.2 °F (36.8 °C)] 97.3 °F (36.3 °C)  Pulse:  [61-88] 69  Resp:  [16-18] 16  SpO2:  [89 %-96 %] 95 %  BP: (105-149)/(58-72) 146/67     Weight: 69.8 kg (153 lb 14.1 oz)  Body mass index is 25.61 kg/m².  Body surface area is 1.79 meters squared.    ECOG SCORE             Intake/Output - Last 3 Shifts         05/10 0700  05/11 0659 05/11 0700  05/12 0659 05/12 0700  05/13 0659    P.O. 860 990     Blood 570      IV Piggyback 100      Total Intake(mL/kg) 1530 (22.2) 990 (14.2)     Urine (mL/kg/hr) 1950 (1.2) 1600 (1) 300 (1.1)    Stool       Total Output 1950 1600 300    Net -420 -610 -300           Urine Occurrence  1 x     Stool Occurrence 4 x              Physical Exam  Vitals and nursing note reviewed.   Constitutional:       General: She is not in acute distress.     Appearance: She is well-developed. She is ill-appearing. She is not toxic-appearing.   HENT:      Head: Normocephalic and atraumatic.      Mouth/Throat:      Mouth: Mucous membranes are dry.      Pharynx: No oropharyngeal exudate.   Eyes:      Conjunctiva/sclera: Conjunctivae normal.      Pupils: Pupils are equal, round, and reactive to light.   Cardiovascular:      Rate and Rhythm: Normal rate and regular rhythm.      Heart sounds: Normal heart sounds. No murmur  heard.  Pulmonary:      Breath sounds: No wheezing or rales.      Comments: Diminished lung sounds in all fields  Abdominal:      General: Bowel sounds are normal. There is no distension.      Palpations: Abdomen is soft.      Tenderness: There is no abdominal tenderness.   Musculoskeletal:         General: No deformity. Normal range of motion.      Cervical back: Normal range of motion and neck supple.      Comments: Redness to bilat great toes. Improving.   Skin:     General: Skin is warm and dry.      Findings: No erythema or rash.      Comments: Right chest wall port. Dressing c/d/i. No sign of infection to site.   Neurological:      Mental Status: She is alert and oriented to person, place, and time.      Cranial Nerves: No cranial nerve deficit.      Motor: Weakness (generalized weakness) present.   Psychiatric:         Behavior: Behavior normal.         Thought Content: Thought content normal.         Judgment: Judgment normal.       Significant Labs:   CBC:   Recent Labs   Lab 05/11/22  0503 05/12/22  0343   WBC 7.96 8.09   HGB 8.4* 7.5*   HCT 26.2* 23.1*   * 112*    and CMP:   Recent Labs   Lab 05/11/22  0503 05/12/22  0343   * 130*   K 3.5 3.3*   CL 89* 90*   CO2 35* 32*   * 101   BUN 26* 26*   CREATININE 0.7 0.7   CALCIUM 9.1 8.5*   PROT 5.8* 5.5*   ALBUMIN 2.0* 1.9*   BILITOT 0.6 0.6   ALKPHOS 94 86   AST 59* 68*   ALT 87* 81*   ANIONGAP 10 8   EGFRNONAA >60.0 >60.0       Diagnostic Results:  None

## 2022-05-12 NOTE — ASSESSMENT & PLAN NOTE
Patient admitted with acute hypoxic respiratory failure, she was treated for PNA during recent admission and was initiated on home oxygen after 6 min walk test previous admission.  - CXR with pulmonary consolidation and fever on admission, recently completed levaquin/azithro course for CAP.   - Started on Cefepime on admission, further infectious work up unrevealing and afebrile >48 hours and Cefepime discontinued 5/3. Given increasing O2 requirements over the weekend with persistent RML consolidation, Cefepime resumed 5/8.   - Continue scheduled DuoNebs  - 5/6 CT Chest: Increased RML Consolidation  - 5/7 CT-A: Negative for PE; ABG with hypoxemia.   - 5/9: Pulmonary consulted given increasing O2 requirements.   - S/p Bronch 5/11 AM. AFB/KOH staining was negative. Gram stain negative. Remaining cultures/studies pending. Awaiting further pulm recommendations.

## 2022-05-12 NOTE — MEDICAL/APP STUDENT
"[facility]  Progress Note    Patient Name: Susan Puente  MRN: [id number]  Patient Class: IP- Inpatient   Admission Date: 4/29/2022  Length of Stay: 10 days  Attending Physician: Gita Valdes MD  Primary Care Provider: Claudia Rapp MD    Subjective:     Principal Problem: Acute gout of foot    Chief Complaint:   Chief Complaint   Patient presents with    Bilateral foot pain      Was d/c'ed 3 days ago for pneumonia; pt's family called EMS bc "they can't take care of her bc they are leaving for vacation". Pt usually uses walker to ambulate, states "I can't walk bc my feet hurt".        HPI: Ms. Susan Puente is a 71 y.o F with MDS on treatment with Vidaza (currently receiving decitabine due to national Vidaza shortage), NIDDM2, HTN, CAD, anxiety, depression, and gout. She was recently admitted 4/23/22 to 4/27/22 with CAP. She completed a course of Azithromycin and was discharged with home health, oxygen, and a 7 day course of Levaquin. She re-presented to the ED on 4/29 complaining of foot pain making it difficult to ambulate at home. Redness noted to bilateral great toes and X-ray showing soft tissue edema suggestive of gout to right foot and bunion to left foot.     Pulmonology consulted for increased oxygen requirements (now on 4L NC) and evaluation of worsening RLL consolidative process on CT s/p treatment for CAP.      Hospital Course: No notes on file    Interval History:  5/6 continued to have O2 sats in the low 90s despite 3L NC and repositioning. CT Chest: Increased RLL Consolidation  5/7 CT-A: Negative for PE; ABG with hypoxemia   5/11 pt underwent bronchoscopy w/BAL. Waiting on cultures and cytology results    Past Medical History:   Diagnosis Date    Allergic rhinitis     Allergy     Anemia     Anxiety     CAD (coronary artery disease)     Carotid stenosis     Colon polyps 2016    Controlled type 2 diabetes mellitus without complication, without long-term current use of " insulin 10/19/2016    Depression     GERD (gastroesophageal reflux disease)     Hearing loss in right ear     Hx of psychiatric care     Celexa, Prozac    Hypokalemia 2/3/2020    MDS (myelodysplastic syndrome) with 5q deletion     Psychiatric problem     Therapy        Past Surgical History:   Procedure Laterality Date    BONE MARROW BIOPSY Left 6/7/2018    Procedure: Biopsy-bone marrow;  Surgeon: Gita Valdes MD;  [facility];  Service: Oncology;  Laterality: Left;    BONE MARROW BIOPSY Left 3/21/2019    Procedure: Biopsy-bone marrow;  Surgeon: Gita Valdes MD;  Location: [facility] OR [facility] FLR;  Service: Oncology;  Laterality: Left;    BONE MARROW BIOPSY Left 10/24/2019    Procedure: Biopsy-bone marrow;  Surgeon: Gita Valdes MD;  [facility];  Service: Oncology;  Laterality: Left;  left iliac crest    BONE MARROW BIOPSY Left 4/21/2021    Procedure: Biopsy-bone marrow;  Surgeon: Gita Valdes MD;  Location: [facility] ENDO (4TH FLR);  Service: Oncology;  Laterality: Left;    BUNIONECTOMY      CAROTID ENDARTERECTOMY Left 2011    CAROTID STENT      COLONOSCOPY N/A 12/20/2016    Procedure: COLONOSCOPY;  Surgeon: PATRICIA Simpson MD;  Location: [facility] ENDO (4TH FLR);  Service: Endoscopy;  Laterality: N/A;  pt has vomiting with anesthesia in past    ENDOMETRIAL ABLATION      for endometriosis    INSERTION OF TUNNELED CENTRAL VENOUS CATHETER (CVC) WITH SUBCUTANEOUS PORT N/A 2/19/2020    Procedure: [id number];  Surgeon: Dosserena Diagnostic Provider;  [facility];  Service: Radiology;  Laterality: N/A;  189    TONSILLECTOMY      TYMPANOSTOMY TUBE PLACEMENT         Review of patient's allergies indicates:   Allergen Reactions    Revlimid [lenalidomide] Swelling     Facial swelling  6/8/17 - in the process of desensitation       No current facility-administered medications on file prior to encounter.     Current Outpatient Medications on File Prior to Encounter   Medication  Sig    acetaminophen (TYLENOL) 650 MG TbSR Take 1,300 mg by mouth daily as needed (Headache).    acyclovir (ZOVIRAX) 400 MG tablet Take 1 tablet (400 mg total) by mouth 2 (two) times daily.    aspirin (ECOTRIN) 81 MG EC tablet Take 1 tablet (81 mg total) by mouth once daily.    azacitidine (VIDAZA INJ) Inject as directed every 28 days.    carvediloL (COREG) 12.5 MG tablet Take 1 tablet (12.5 mg total) by mouth 2 (two) times daily with meals.    ciprofloxacin HCl (CIPRO) 500 MG tablet Take 1 tablet (500 mg total) by mouth 2 (two) times daily.    citalopram (CELEXA) 20 MG tablet Take 1 tablet (20 mg total) by mouth once daily.    evolocumab (REPATHA SURECLICK) 140 mg/mL PnIj Inject 140mg (1 pen) into the skin every 2 weeks. (Patient taking differently: Inject 140mg (1 pen) into the skin every 2 weeks.)    fluconazole (DIFLUCAN) 200 MG Tab Take 2 tablets (400 mg total) by mouth once daily.    ketotifen (ZADITOR) 0.025 % (0.035 %) ophthalmic solution Place 2-3 drops into both eyes daily as needed (Allergies).    lactose-reduced food (ENSURE ORAL) Take by mouth daily as needed (supplement).    lisinopriL-hydrochlorothiazide (PRINZIDE,ZESTORETIC) 20-12.5 mg per tablet Take 2 tablets by mouth once daily.    loperamide (IMODIUM) 2 mg capsule Take 4 mg by mouth daily as needed for Diarrhea.    loratadine (CLARITIN) 10 mg tablet Take 10 mg by mouth daily as needed.     magnesium oxide (MAGOX) 400 mg (241.3 mg magnesium) tablet Take 1 tablet (400 mg total) by mouth once daily.    melatonin (MELATIN) 5 mg Take 10 mg by mouth every evening.    MULTIVITAMIN ORAL Take 1 tablet by mouth once daily.    pantoprazole (PROTONIX) 40 MG tablet Take 1 tablet (40 mg total) by mouth once daily.    polyethylene glycol (MIRALAX) 17 gram PwPk Take 17 g by mouth daily as needed (Constipation).    potassium chloride SA (K-DUR,KLOR-CON) 20 MEQ tablet Take 1 tablet (20 mEq total) by mouth once daily.    deferasirox (EXJADE)  500 MG disintegrating tablet Take 3 tablets (1,500 mg total) by mouth before breakfast.    lancets 30 gauge Misc Use to test blood sugar daily (Patient taking differently: Use to test blood sugar daily)     Family History     Problem Relation (Age of Onset)    Alcohol abuse Father, Brother    Cancer Paternal Grandmother    Heart disease Mother, Father    Hyperlipidemia Mother        Tobacco Use    Smoking status: Former Smoker     Packs/day: 1.50     Years: 50.00     Pack years: 75.00     Types: Cigarettes, Vaping with nicotine     Quit date: 3/3/2017     Years since quittin.1    Smokeless tobacco: Never Used   Substance and Sexual Activity    Alcohol use: No    Drug use: No    Sexual activity: Not on file     Review of Systems   Constitutional: Positive for activity change.   HENT: Negative.    Eyes: Negative.    Respiratory: Negative.    Cardiovascular: Negative.    Gastrointestinal: Negative.    Endocrine: Negative.    Genitourinary: Negative.    Musculoskeletal: Positive for arthralgias (BL feet and R hand).   Skin: Positive for color change.        Redness of bilateral great toes   Allergic/Immunologic: Positive for immunocompromised state.   Neurological: Negative.    Hematological: Negative.    Psychiatric/Behavioral: Negative.      Objective:     Vital Signs (Most Recent):  Temp: 98.3 °F (36.8 °C) (22 1212)  Pulse: 65 (22 1212)  Resp: 18 (22 1212)  BP: (!) 146/67 (22 0755)  SpO2: (!) 94 % (22 1212) Vital Signs (24h Range):  Temp:  [97.3 °F (36.3 °C)-98.3 °F (36.8 °C)] 98.3 °F (36.8 °C)  Pulse:  [61-88] 65  Resp:  [16-18] 18  SpO2:  [90 %-95 %] 94 %  BP: (105-149)/(58-72) 146/67     Weight: 69.8 kg (153 lb 14.1 oz)  Body mass index is 25.61 kg/m².    Intake/Output Summary (Last 24 hours) at 2022 1341  Last data filed at 2022 1242  Gross per 24 hour   Intake 1010.41 ml   Output 2150 ml   Net -1139.59 ml      Physical Exam  Constitutional:       Appearance:  Normal appearance.   HENT:      Head: Normocephalic and atraumatic.      Mouth/Throat:      Mouth: Mucous membranes are moist.      Pharynx: Oropharynx is clear.   Eyes:      Extraocular Movements: Extraocular movements intact.      Conjunctiva/sclera: Conjunctivae normal.      Pupils: Pupils are equal, round, and reactive to light.   Cardiovascular:      Rate and Rhythm: Normal rate.      Pulses: Normal pulses.      Heart sounds: Normal heart sounds.   Pulmonary:      Effort: Pulmonary effort is normal.      Breath sounds: Rales (diffuse, bilaterally) present.   Abdominal:      General: Abdomen is flat. There is no distension.      Palpations: Abdomen is soft.      Tenderness: There is no abdominal tenderness. There is no guarding.   Musculoskeletal:         General: Tenderness (BL great toes) present.      Cervical back: Normal range of motion and neck supple.   Skin:     General: Skin is warm.      Capillary Refill: Capillary refill takes 2 to 3 seconds.      Comments: R chest wall port c/d/i   Neurological:      General: No focal deficit present.      Mental Status: She is alert and oriented to person, place, and time.         Significant Labs:   ABGs:   No results for input(s): PH, PCO2, HCO3, POCSATURATED, BE, TOTALHB, COHB, METHB, O2HB, POCFIO2, PO2 in the last 48 hours.  CBC:   Recent Labs   Lab 05/11/22  0503 05/12/22  0343   WBC 7.96 8.09   HGB 8.4* 7.5*   HCT 26.2* 23.1*   * 112*     CMP:   Recent Labs   Lab 05/11/22  0503 05/12/22  0343   * 130*   K 3.5 3.3*   CL 89* 90*   CO2 35* 32*   * 101   BUN 26* 26*   CREATININE 0.7 0.7   CALCIUM 9.1 8.5*   PROT 5.8* 5.5*   ALBUMIN 2.0* 1.9*   BILITOT 0.6 0.6   ALKPHOS 94 86   AST 59* 68*   ALT 87* 81*   ANIONGAP 10 8   EGFRNONAA >60.0 >60.0       Significant Imaging: I have reviewed all pertinent imaging results/findings within the past 24 hours.        Assessment/Plan:      Acute respiratory failure with hypoxia  Patient admitted with acute  hypoxic respiratory failure, she was treated for CAP with Azithromycin during 4/23-4/27 admission and a 7 day course of Levaquin, which she completed on 4/30.  - CXR with pulmonary consolidation and fever on admission  - Started on Cefepime further infectious work up unrevealing and afebrile >48 hours and Cefepime discontinued on 5/3. Given increasing O2 requirements over the weekend with persistent RML consolidation, Cefepime resumed 5/8.   - Continue scheduled DuoNebs  - CT chest significant for worsening of RLL consolidation.    - Concern for either untreated infection or alternative etiology including  or other inflammatory process.   - Pt tolerated bronchoscopy on 5/11 well. Waiting on BAL culture & cytology results      Active Diagnoses:    Diagnosis Date Noted POA    PRINCIPAL PROBLEM:  Acute gout of foot [M10.9] 04/29/2022 Yes    Hyponatremia [E87.1] 05/05/2022 No    Physical debility [R53.81] 04/29/2022 Yes    Acute respiratory failure with hypoxia [J96.01] 04/26/2022 Yes    Pancytopenia due to antineoplastic chemotherapy [D61.810, T45.1X5A] 04/26/2022 Yes    CAP (community acquired pneumonia) [J18.9] 04/24/2022 Yes    Controlled type 2 diabetes mellitus without complication, without long-term current use of insulin [E11.9] 07/17/2021 Yes    Insomnia [G47.00] 01/06/2020 Yes    MDS (myelodysplastic syndrome) [D46.9] 06/07/2018 Yes    Adjustment disorder with mixed anxiety and depressed mood [F43.23] 03/05/2018 Yes    Essential hypertension [I10] 12/01/2016 Yes    CAD (coronary artery disease) [I25.10]  Yes      Problems Resolved During this Admission:    Diagnosis Date Noted Date Resolved POA    Poor appetite [R63.0] 05/05/2022 05/07/2022 Yes    Fever [R50.9] 05/01/2022 05/07/2022 Yes     VTE Risk Mitigation (From admission, onward)         Ordered     IP VTE HIGH RISK PATIENT  Once         04/29/22 1651     Place sequential compression device  Until discontinued         04/29/22 1651                    Braden Orantes, MS-4  [facility]

## 2022-05-13 NOTE — PT/OT/SLP PROGRESS
Occupational Therapy Treatment    Patient Name:  Susan Puente   MRN:  [id number]  Admit Date: 4/29/2022  Admitting Diagnosis:  Acute respiratory failure with hypoxia   Length of Stay: 11 days  Recent Surgery: * No surgery found *      Recommendations:     Discharge Recommendations: nursing facility, skilled  Discharge Equipment Recommendations:  other (see comments) (TBD (progress pending))  Barriers to discharge:  Inaccessible home environment, Other (Comment)    Plan:     Patient to be seen 3 x/week to address the above listed problems via self-care/home management, therapeutic activities, therapeutic exercises  · Plan of Care Expires: 05/30/22  · Plan of Care Reviewed with: patient    Assessment:   Susan Puente is a 71 y.o. female with a medical diagnosis of Acute respiratory failure with hypoxia.  She presents with the following performance deficits affecting function: weakness, impaired endurance, impaired self care skills, impaired functional mobilty, gait instability, impaired balance, pain, decreased safety awareness, decreased lower extremity function, impaired cardiopulmonary response to activity, edema, impaired skin, impaired coordination, decreased coordination.      Pt tolerated session fairly this date and was willing to participate. Demonstrating continued impaired endurance, increased fatigue, impaired balance, increased pain, impaired BLE function, increased weakness, impaired cardiopulmonary response to activity and decreased safety awareness, requiring increased time and assistance to complete functional tasks. Patient completed sit>Stand transfer from chair with Mod A using RW, required cueing for technique and safety. Patient completed step transfer to bed with Min A using RW, increased FF posture and short steps. Patient limited by increased reports of pain and fatigue. Patient is progressing towards established goals, and continues to benefit from acute skilled OT services to  "increase functional performance and improve quality of life. OT to continue to recommend SNF at discharge to improve pt functional independence and increase patient safety before returning home.      Rehab Prognosis: Fair; patient would benefit from acute skilled OT services to address these deficits and reach maximum level of function.        Subjective   Communicated with: Nurse prior to session.  Patient found up in chair with telemetry, oxygen, pressure relief boots, peripheral IV, PureWick upon OT entry to room. Pt agreeable to participate at this time.    Patient: " I found a spot where I didn't hurt and I stayed in it no matter how uncomfortable it looked "    Pain/Comfort:  · Pain Rating 1: 7/10  · Location - Orientation 1: generalized  · Location 1:  (buttocks/pressure injury)  · Pain Addressed 1: Distraction, Cessation of Activity  · Pain Rating Post-Intervention 1: other (see comments) (patient did not rate; resting comfortably)    Objective:   Patient found with: telemetry, oxygen, pressure relief boots, peripheral IV, PureWick   General Precautions: Standard, Cardiac fall   Orthopedic Precautions:N/A   Braces: N/A   Oxygen Device: Nasal Cannula 5L  Vitals: BP (!) 127/59 (BP Location: Left arm, Patient Position: Lying)   Pulse 76   Temp 98.4 °F (36.9 °C) (Oral)   Resp 20   Ht 5' 5" (1.651 m)   Wt 69.8 kg (153 lb 14.1 oz)   SpO2 (!) 92%   BMI 25.61 kg/m²     Outcome Measures:  [facility] 6 Click ADL: 18    Cognition:   · Alert and Cooperative  · Command following: follows one-step commands  · Communication: clear/fluent    Occupational Performance:  Bed Mobility:    · Patient completed Sit to Supine with minimum assistance on R side of bed  · Scooting to HOB in supine: contact guard assistance    Functional Mobility/Transfers:   Patient completed Sit <> Stand Transfer from chair with moderate assistance  with  rolling walker ; increased time   Static Standing Balance: CGA; FF posture   Patient " completed Bed <> Chair Transfer using Step Transfer technique with minimum assistance with rolling walker   Patient completed right lateral scooting along EOB with CGA with increased time      Activities of Daily Living:  · Grooming: stand by assistance to perform oral care and wash face seated in chair (slouched)  · Upper Body Dressing: maximal assistance to doff/don gown and doff/don adult brief  · Toileting: maximal assistance to perform pericare following purewick malfunction    AMPAC 6 Click ADL:  AMPAC Total Score: 18    Treatment & Education:  -Pt education on OT role and POC.  -Importance of E/OOB activity with staff assistance, emphasis on daily participation  -Safety during functional transfer and mobility ensured  -Patient provided with education on importance of Bilateral UB/LB integration during functional tasks for improvement in functional performance.   -Education provided/reviewed, questions answered within OT scope of practice.   -Patient demonstrates understanding and learning this date.         Patient left HOB elevated with all lines intact, call button in reach, nurse notified and pressure relief boots donned    GOALS:   Multidisciplinary Problems     Occupational Therapy Goals        Problem: Occupational Therapy    Goal Priority Disciplines Outcome Interventions   Occupational Therapy Goal     OT, PT/OT Ongoing, Progressing    Description: Goals to be met by: 5/14/22     Patient will increase functional independence with ADLs by performing:    LE Dressing with Set-up Assistance.  Grooming while standing at sink with Stand-by Assistance.  Toileting from toilet with Minimal Assistance for hygiene and clothing management.   Toilet transfer to toilet with Stand-by Assistance.  Functional mobility of household and community distance with  minimum assistance and AD as needed                       Time Tracking:     OT Date of Treatment: 05/13/22  OT Start Time: 1340  OT Stop Time: 1418  OT Total  Time (min): 38 min    Billable Minutes:Self Care/Home Management 25  Therapeutic Exercise 13      5/13/2022

## 2022-05-13 NOTE — SUBJECTIVE & OBJECTIVE
Subjective:     Interval History: Pt feeling okay today, still coughing and requiring 4L O2. Remains afebrile and infectious work up/bronch unrevealing thus far apart from inflammation, starting steroids today. BP stable. Working with PT/OT. Joint pain improved. Plan to transfer to SNF early next week likely.     Objective:     Vital Signs (Most Recent):  Temp: 98.8 °F (37.1 °C) (05/13/22 0923)  Pulse: 76 (05/13/22 1105)  Resp: 18 (05/13/22 0923)  BP: (!) 147/67 (05/13/22 0923)  SpO2: (!) 92 % (05/13/22 0923)   Vital Signs (24h Range):  Temp:  [98.1 °F (36.7 °C)-98.8 °F (37.1 °C)] 98.8 °F (37.1 °C)  Pulse:  [65-85] 76  Resp:  [15-19] 18  SpO2:  [86 %-96 %] 92 %  BP: (114-155)/(56-77) 147/67     Weight: 69.8 kg (153 lb 14.1 oz)  Body mass index is 25.61 kg/m².  Body surface area is 1.79 meters squared.    ECOG SCORE             Intake/Output - Last 3 Shifts         05/11 0700  05/12 0659 05/12 0700  05/13 0659 05/13 0700  05/14 0659    P.O. 990 400     I.V. (mL/kg)  380.7 (5.5)     Blood       IV Piggyback  296.4     Total Intake(mL/kg) 990 (14.2) 1077.1 (15.4)     Urine (mL/kg/hr) 1600 (1) 2200 (1.3) 550 (1.9)    Stool  0     Total Output 1600 2200 550    Net -610 -1122.9 -550           Urine Occurrence 1 x      Stool Occurrence  0 x             Physical Exam  Vitals and nursing note reviewed.   Constitutional:       General: She is not in acute distress.     Appearance: She is well-developed. She is ill-appearing. She is not toxic-appearing.   HENT:      Head: Normocephalic and atraumatic.      Mouth/Throat:      Mouth: Mucous membranes are dry.      Pharynx: No oropharyngeal exudate.   Eyes:      Conjunctiva/sclera: Conjunctivae normal.      Pupils: Pupils are equal, round, and reactive to light.   Cardiovascular:      Rate and Rhythm: Normal rate and regular rhythm.      Heart sounds: Normal heart sounds. No murmur heard.  Pulmonary:      Breath sounds: No wheezing or rales.      Comments: Diminished lung  sounds in all fields  Abdominal:      General: Bowel sounds are normal. There is no distension.      Palpations: Abdomen is soft.      Tenderness: There is no abdominal tenderness.   Musculoskeletal:         General: No deformity. Normal range of motion.      Cervical back: Normal range of motion and neck supple.      Comments: Redness to bilat great toes. Improving.   Skin:     General: Skin is warm and dry.      Findings: No erythema or rash.      Comments: Right chest wall port. Dressing c/d/i. No sign of infection to site.   Neurological:      Mental Status: She is alert and oriented to person, place, and time.      Cranial Nerves: No cranial nerve deficit.      Motor: Weakness (generalized weakness) present.   Psychiatric:         Behavior: Behavior normal.         Thought Content: Thought content normal.         Judgment: Judgment normal.       Significant Labs:   CBC:   Recent Labs   Lab 05/12/22  0343 05/13/22  0354   WBC 8.09 7.57   HGB 7.5* 7.0*   HCT 23.1* 22.4*   * 110*    and CMP:   Recent Labs   Lab 05/12/22 0343 05/13/22  0354   * 133*   K 3.3* 4.0   CL 90* 96   CO2 32* 31*    110   BUN 26* 18   CREATININE 0.7 0.6   CALCIUM 8.5* 8.5*   PROT 5.5* 5.2*   ALBUMIN 1.9* 1.8*   BILITOT 0.6 0.5   ALKPHOS 86 95   AST 68* 55*   ALT 81* 66*   ANIONGAP 8 6*   EGFRNONAA >60.0 >60.0       Diagnostic Results:  None

## 2022-05-13 NOTE — MED STUDENT SUBJECTIVE & OBJECTIVE
"Medical Student Subjective & Objective     Principal Problem: Acute respiratory failure with hypoxia    Chief Complaint:   Chief Complaint   Patient presents with    Bilateral foot pain      Was d/c'ed 3 days ago for pneumonia; pt's family called EMS bc "they can't take care of her bc they are leaving for vacation". Pt usually uses walker to ambulate, states "I can't walk bc my feet hurt".        HPI: Ms. Susan Puente is a 71 y.o F with MDS on treatment with Vidaza (currently receiving decitabine due to national Vidaza shortage), NIDDM2, HTN, CAD, anxiety, depression, and gout. She was recently admitted 4/23/22 to 4/27/22 with CAP. She completed a course of Azithromycin and was discharged with home health, oxygen, and a 7 day course of Levaquin. She re-presented to the ED on 4/29 complaining of foot pain making it difficult to ambulate at home. Redness noted to bilateral great toes and X-ray showing soft tissue edema suggestive of gout to right foot and bunion to left foot.     Pulmonology consulted for increased oxygen requirements and evaluation of worsening RLL consolidative process on CT s/p treatment for CAP.      Hospital Course: No notes on file    Interval History: Pt now on 4L NC w/humidification O2 sat 94%    Past Medical History:   Diagnosis Date    Allergic rhinitis     Allergy     Anemia     Anxiety     CAD (coronary artery disease)     Carotid stenosis     Colon polyps 2016    Controlled type 2 diabetes mellitus without complication, without long-term current use of insulin 10/19/2016    Depression     GERD (gastroesophageal reflux disease)     Hearing loss in right ear     Hx of psychiatric care     Celexa, Prozac    Hypokalemia 2/3/2020    MDS (myelodysplastic syndrome) with 5q deletion     Psychiatric problem     Therapy        Past Surgical History:   Procedure Laterality Date    BONE MARROW BIOPSY Left 6/7/2018    Procedure: Biopsy-bone marrow;  Surgeon: Gita Valdes MD;  " Location: [facility] OR [facility] FLR;  Service: Oncology;  Laterality: Left;    BONE MARROW BIOPSY Left 3/21/2019    Procedure: Biopsy-bone marrow;  Surgeon: Gita Valdes MD;  Location: [facility] OR 2ND FLR;  Service: Oncology;  Laterality: Left;    BONE MARROW BIOPSY Left 10/24/2019    Procedure: Biopsy-bone marrow;  Surgeon: Gita Valdes MD;  Location: [facility] OR 2ND FLR;  Service: Oncology;  Laterality: Left;  left iliac crest    BONE MARROW BIOPSY Left 4/21/2021    Procedure: Biopsy-bone marrow;  Surgeon: Gita Valdes MD;  Location: [facility] ENDO (4TH FLR);  Service: Oncology;  Laterality: Left;    BUNIONECTOMY      CAROTID ENDARTERECTOMY Left 2011    CAROTID STENT      COLONOSCOPY N/A 12/20/2016    Procedure: COLONOSCOPY;  Surgeon: PATRICIA Simpson MD;  Location: [facility] ENDO (4TH FLR);  Service: Endoscopy;  Laterality: N/A;  pt has vomiting with anesthesia in past    ENDOMETRIAL ABLATION      for endometriosis    INSERTION OF TUNNELED CENTRAL VENOUS CATHETER (CVC) WITH SUBCUTANEOUS PORT N/A 2/19/2020    Procedure: [id number];  Surgeon: Deepa Diagnostic Provider;  [facility];  Service: Radiology;  Laterality: N/A;  189    TONSILLECTOMY      TYMPANOSTOMY TUBE PLACEMENT         Review of patient's allergies indicates:   Allergen Reactions    Revlimid [lenalidomide] Swelling     Facial swelling  6/8/17 - in the process of desensitation       No current facility-administered medications on file prior to encounter.     Current Outpatient Medications on File Prior to Encounter   Medication Sig    acetaminophen (TYLENOL) 650 MG TbSR Take 1,300 mg by mouth daily as needed (Headache).    acyclovir (ZOVIRAX) 400 MG tablet Take 1 tablet (400 mg total) by mouth 2 (two) times daily.    aspirin (ECOTRIN) 81 MG EC tablet Take 1 tablet (81 mg total) by mouth once daily.    azacitidine (VIDAZA INJ) Inject as directed every 28 days.    carvediloL (COREG) 12.5 MG tablet Take 1 tablet (12.5 mg total) by mouth 2 (two)  times daily with meals.    ciprofloxacin HCl (CIPRO) 500 MG tablet Take 1 tablet (500 mg total) by mouth 2 (two) times daily.    citalopram (CELEXA) 20 MG tablet Take 1 tablet (20 mg total) by mouth once daily.    evolocumab (REPATHA SURECLICK) 140 mg/mL PnIj Inject 140mg (1 pen) into the skin every 2 weeks. (Patient taking differently: Inject 140mg (1 pen) into the skin every 2 weeks.)    fluconazole (DIFLUCAN) 200 MG Tab Take 2 tablets (400 mg total) by mouth once daily.    ketotifen (ZADITOR) 0.025 % (0.035 %) ophthalmic solution Place 2-3 drops into both eyes daily as needed (Allergies).    lactose-reduced food (ENSURE ORAL) Take by mouth daily as needed (supplement).    lisinopriL-hydrochlorothiazide (PRINZIDE,ZESTORETIC) 20-12.5 mg per tablet Take 2 tablets by mouth once daily.    loperamide (IMODIUM) 2 mg capsule Take 4 mg by mouth daily as needed for Diarrhea.    loratadine (CLARITIN) 10 mg tablet Take 10 mg by mouth daily as needed.     magnesium oxide (MAGOX) 400 mg (241.3 mg magnesium) tablet Take 1 tablet (400 mg total) by mouth once daily.    melatonin (MELATIN) 5 mg Take 10 mg by mouth every evening.    MULTIVITAMIN ORAL Take 1 tablet by mouth once daily.    pantoprazole (PROTONIX) 40 MG tablet Take 1 tablet (40 mg total) by mouth once daily.    polyethylene glycol (MIRALAX) 17 gram PwPk Take 17 g by mouth daily as needed (Constipation).    potassium chloride SA (K-DUR,KLOR-CON) 20 MEQ tablet Take 1 tablet (20 mEq total) by mouth once daily.    deferasirox (EXJADE) 500 MG disintegrating tablet Take 3 tablets (1,500 mg total) by mouth before breakfast.    lancets 30 gauge Misc Use to test blood sugar daily (Patient taking differently: Use to test blood sugar daily)     Family History     Problem Relation (Age of Onset)    Alcohol abuse Father, Brother    Cancer Paternal Grandmother    Heart disease Mother, Father    Hyperlipidemia Mother        Tobacco Use    Smoking status: Former  Smoker     Packs/day: 1.50     Years: 50.00     Pack years: 75.00     Types: Cigarettes, Vaping with nicotine     Quit date: 3/3/2017     Years since quittin.2    Smokeless tobacco: Never Used   Substance and Sexual Activity    Alcohol use: No    Drug use: No    Sexual activity: Not on file     Review of Systems   Constitutional: Positive for activity change and fatigue.   HENT: Negative.    Eyes: Negative.    Respiratory: Positive for shortness of breath.    Cardiovascular: Negative.    Gastrointestinal: Negative.    Endocrine: Negative.    Genitourinary: Negative.    Musculoskeletal: Positive for arthralgias.   Skin: Negative.    Allergic/Immunologic: Positive for immunocompromised state.   Neurological: Negative.    Hematological: Negative.    Psychiatric/Behavioral: Negative.      Objective:     Vital Signs (Most Recent):  Temp: 98.1 °F (36.7 °C) (22 0740)  Pulse: 74 (22 0740)  Resp: 17 (22 0740)  BP: (!) 154/70 (22 0740)  SpO2: 95 % (22 0740) Vital Signs (24h Range):  Temp:  [97.5 °F (36.4 °C)-98.2 °F (36.8 °C)] 98.1 °F (36.7 °C)  Pulse:  [65-84] 74  Resp:  [16-20] 17  SpO2:  [93 %-97 %] 95 %  BP: (134-166)/(63-72) 154/70     Weight: 69.8 kg (153 lb 14.1 oz)  Body mass index is 25.61 kg/m².    Intake/Output Summary (Last 24 hours) at 2022 1052  Last data filed at 2022 0900  Gross per 24 hour   Intake 770 ml   Output 1800 ml   Net -1030 ml      Physical Exam  Constitutional:       Appearance: Normal appearance.   HENT:      Head: Normocephalic and atraumatic.      Nose: Nose normal.      Mouth/Throat:      Mouth: Mucous membranes are moist.      Pharynx: Oropharynx is clear.   Eyes:      Extraocular Movements: Extraocular movements intact.      Conjunctiva/sclera: Conjunctivae normal.      Pupils: Pupils are equal, round, and reactive to light.   Cardiovascular:      Rate and Rhythm: Normal rate and regular rhythm.      Pulses: Normal pulses.      Heart sounds:  Normal heart sounds.   Pulmonary:      Effort: Pulmonary effort is normal.      Breath sounds: Rales (bilateral bases) present.   Abdominal:      General: Abdomen is flat. Bowel sounds are normal. There is no distension.      Palpations: Abdomen is soft.      Tenderness: There is no abdominal tenderness. There is no guarding.   Musculoskeletal:         General: Tenderness (R hand, bilateral knees, bilateral great toes) present.      Cervical back: Normal range of motion and neck supple.   Skin:     General: Skin is warm and dry.      Capillary Refill: Capillary refill takes 2 to 3 seconds.      Coloration: Skin is pale.      Comments: R  Chest wall port c/d/i   Neurological:      General: No focal deficit present.      Mental Status: She is alert and oriented to person, place, and time. Mental status is at baseline.         Significant Labs:   All pertinent labs within the past 24 hours have been reviewed.  CBC:   Recent Labs   Lab 05/16/22  0331 05/17/22  0353   WBC 7.20 4.75   HGB 8.4* 7.6*   HCT 26.6* 24.1*   * 130*     CMP:   Recent Labs   Lab 05/16/22  0331 05/17/22  0353    138   K 3.7 3.5   CL 97 100   CO2 31* 31*   * 190*   BUN 32* 35*   CREATININE 0.6 0.7   CALCIUM 8.5* 8.6*   PROT 5.4* 5.4*   ALBUMIN 2.0* 2.1*   BILITOT 0.3 0.3   ALKPHOS 87 91   AST 50* 40   ALT 79* 77*   ANIONGAP 8 7*   EGFRNONAA >60.0 >60.0     Respiratory Culture:   No results for input(s): GSRESP, RESPIRATORYC in the last 48 hours.    Significant Imaging: I have reviewed all pertinent imaging results/findings within the past 24 hours.    Medical Student Subjective & Objective

## 2022-05-13 NOTE — PROGRESS NOTES
[facility] - Oncology ([facility])  Hematology  Bone Marrow Transplant  Progress Note    Patient Name: Susan Puente  Admission Date: 4/29/2022  Hospital Length of Stay: 11 days  Code Status: Full Code    Subjective:     Interval History: Pt feeling okay today, still coughing and requiring 4L O2. Remains afebrile and infectious work up/bronch unrevealing thus far apart from inflammation, starting steroids today. BP stable. Working with PT/OT. Joint pain improved. Plan to transfer to SNF early next week likely.     Objective:     Vital Signs (Most Recent):  Temp: 98.8 °F (37.1 °C) (05/13/22 0923)  Pulse: 76 (05/13/22 1105)  Resp: 18 (05/13/22 0923)  BP: (!) 147/67 (05/13/22 0923)  SpO2: (!) 92 % (05/13/22 0923)   Vital Signs (24h Range):  Temp:  [98.1 °F (36.7 °C)-98.8 °F (37.1 °C)] 98.8 °F (37.1 °C)  Pulse:  [65-85] 76  Resp:  [15-19] 18  SpO2:  [86 %-96 %] 92 %  BP: (114-155)/(56-77) 147/67     Weight: 69.8 kg (153 lb 14.1 oz)  Body mass index is 25.61 kg/m².  Body surface area is 1.79 meters squared.    ECOG SCORE             Intake/Output - Last 3 Shifts         05/11 0700  05/12 0659 05/12 0700  05/13 0659 05/13 0700  05/14 0659    P.O. 990 400     I.V. (mL/kg)  380.7 (5.5)     Blood       IV Piggyback  296.4     Total Intake(mL/kg) 990 (14.2) 1077.1 (15.4)     Urine (mL/kg/hr) 1600 (1) 2200 (1.3) 550 (1.9)    Stool  0     Total Output 1600 2200 550    Net -610 -1122.9 -550           Urine Occurrence 1 x      Stool Occurrence  0 x             Physical Exam  Vitals and nursing note reviewed.   Constitutional:       General: She is not in acute distress.     Appearance: She is well-developed. She is ill-appearing. She is not toxic-appearing.   HENT:      Head: Normocephalic and atraumatic.      Mouth/Throat:      Mouth: Mucous membranes are dry.      Pharynx: No oropharyngeal exudate.   Eyes:      Conjunctiva/sclera: Conjunctivae normal.      Pupils: Pupils are equal, round, and reactive to light.    Cardiovascular:      Rate and Rhythm: Normal rate and regular rhythm.      Heart sounds: Normal heart sounds. No murmur heard.  Pulmonary:      Breath sounds: No wheezing or rales.      Comments: Diminished lung sounds in all fields  Abdominal:      General: Bowel sounds are normal. There is no distension.      Palpations: Abdomen is soft.      Tenderness: There is no abdominal tenderness.   Musculoskeletal:         General: No deformity. Normal range of motion.      Cervical back: Normal range of motion and neck supple.      Comments: Redness to bilat great toes. Improving.   Skin:     General: Skin is warm and dry.      Findings: No erythema or rash.      Comments: Right chest wall port. Dressing c/d/i. No sign of infection to site.   Neurological:      Mental Status: She is alert and oriented to person, place, and time.      Cranial Nerves: No cranial nerve deficit.      Motor: Weakness (generalized weakness) present.   Psychiatric:         Behavior: Behavior normal.         Thought Content: Thought content normal.         Judgment: Judgment normal.       Significant Labs:   CBC:   Recent Labs   Lab 05/12/22  0343 05/13/22  0354   WBC 8.09 7.57   HGB 7.5* 7.0*   HCT 23.1* 22.4*   * 110*    and CMP:   Recent Labs   Lab 05/12/22  0343 05/13/22  0354   * 133*   K 3.3* 4.0   CL 90* 96   CO2 32* 31*    110   BUN 26* 18   CREATININE 0.7 0.6   CALCIUM 8.5* 8.5*   PROT 5.5* 5.2*   ALBUMIN 1.9* 1.8*   BILITOT 0.6 0.5   ALKPHOS 86 95   AST 68* 55*   ALT 81* 66*   ANIONGAP 8 6*   EGFRNONAA >60.0 >60.0       Diagnostic Results:  None    Assessment/Plan:     * Acute respiratory failure with hypoxia  Patient admitted with acute hypoxic respiratory failure, she was treated for PNA during recent admission and was initiated on home oxygen after 6 min walk test previous admission.  - CXR with pulmonary consolidation and fever on admission, recently completed levaquin/azithro course for CAP.   - Started on  Cefepime on admission, further infectious work up unrevealing and afebrile >48 hours and Cefepime discontinued 5/3. Given increasing O2 requirements over the weekend with persistent RML consolidation, Cefepime resumed 5/8.   - Continue scheduled DuoNebs  - 5/6 CT Chest: Increased RML Consolidation  - 5/7 CT-A: Negative for PE; ABG with hypoxemia.   - 5/9: Pulmonary consulted given increasing O2 requirements.   - S/p Bronch 5/11 AM. AFB/KOH staining was negative. Gram stain negative. Remaining cultures/studies NGTD. Bronch cytology with macrophages and inflammation.  - Plan to start Prednisone taper 60mg x 2wks, then taper down by 10mg every two weeks until complete.  - Given prolonged steroid taper, will continue GI ppx and will need PJP ppx. Will discuss with outpt hematologist PJP ppx preference given her cytopenias.   - Will f/u with pulm outpatient     Acute gout of foot  - patient reports difficulty bearing weight on bilat feet due to pain, improving  - x-ray showing soft tissue edema suggestive of gout flare  - uric acid wnl  - allopurinol 100 mg daily for prevention  - cooling muscle cream PRN for additional pain support     MDS (myelodysplastic syndrome)  - Primary oncologist, Dr. 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 Developed allergy and was then desensitized. Soon after developed pancytopenia and Revlimid was stopped June 2017.    - BMBX on 6/8/17 was with normal cytogenetics. 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 Discussed treatment options of HMA therapy or repeat trial of Revlimid and patient wished to proceed with Revlimid   - Revlimid stopped again due to repeat allergic reaction and pancytopenia 3/2019  - Started cycle 1 Vidaza 5/6/19 subq for 5 days every 28 days  - Restaging bone marrow biopsy following cycle 5 from 10/24/19 shows persistent MDS, no excess blasts and 3 of 20 metaphases with 5q deletion  - Restaging marrow on 04/21/21 with  persistent MDS with isolated del 5Q. Of 20 metaphases, 5 were normal and 15 had a 5q deletion, NGS positive for SF3B1 and TP53 (12%). 1.4% blasts  - Received vidaza for 22 cycles, received Decitabine for C23 due to national shortage of vidaza and then back to vidaza with C24.  - completed cycle 39 on 4/8/22  - due for cycle 40 on 05/02/2022, postponed whiled admitted and to be readdressed as outpatient     Essential hypertension  - At home regimen: lisinopril/hctz and coreg  - SBP 170s-180s. Increased Coreg dose to 25mg with minimal improveemt  - Discussed with cardiology who recommended switching HCTZ to Chlorthalidone 25mg daily and adding Nifedipine XL 60mg with outpatient follow up. Nifedipine was held d/t hypotension and to allow for some permissible hypertension given clinical status  - Current anti-HTN regimen: Lisinopril 40mg daily, Coreg 25mg BID, Chlorthalidone 25mg daily. Nifedipine XL 30mg daily resumed 5/11 due to persistent HTN.  - Will need cardiology f/u outpatient    Hyponatremia  - Suspect insetting of hypovolemia  - Mild, Na 133 today.   - Serum osm/urine osm normal.   - Resumed IVF given poor PO intake    Physical debility  - reports difficulty with ambulation due to pain and weakness  - PT/OT consulted. Recommending SNF. Patient has been accepted SNF. Transfer pending bronch results.  - Boost with meals for nutritional support    Pancytopenia due to antineoplastic chemotherapy  - Transfuse for hgb < 7 or plts < 10K  - Daily cbc while inpatient  - Continue acyclovir ppx  - Cefepime and fluconazole d/c'd 5/12 given normal WBC and afebrile     CAP (community acquired pneumonia)  - Pt with 2 weeks of productive cough, nasal congestion  - Upon arrival to the hospial she was hypoxic requiring 3L NC, weaned to 2L today  - CXR with likely develop consolidation  - CTA shows New patchy consolidative opacities in the in the right upper and posterior right lower lobes.  Findings suggestive infectious  etiology such as pneumonia, multifocal pneumonia.  Also consider atypical given patient's reported immunocompromised status.  Aspiration could present similarly.  Consider continued follow-up after acute clinical illness has resolved to ensure resolution. New mediastinal and bilateral hilar lymphadenopathy, likely reactive.  - completed azithromycin and 7 day course of Levaquin on 4/30.  - CXR on admission showing possible developing consolidation: Cefepime 4/30-5/3    - See acute respiratory failure with hypoxia above    Controlled type 2 diabetes mellitus without complication, without long-term current use of insulin  - Will hold home po diabetic medications  - SSI, ACHS blood glucose monitoring, and diabetic diet  - Goal bg 140-180    Hypokalemia  - K+ 4.0 today  - replacing per prn order set  - daily CMP while inpatient    Insomnia  - Continue nightly melatonin  - Added remeron 5/5    Adjustment disorder with mixed anxiety and depressed mood  - continue home celexa    CAD (coronary artery disease)  - S/P left carotid endarterectomy prior to cancer dx, s/p PCI (3 stents) >20 years ago   - Continue on ASA 81mg daily, platelets are wnl  - On repatha as outpatient (statin intolerant)        VTE Risk Mitigation (From admission, onward)         Ordered     IP VTE HIGH RISK PATIENT  Once         04/29/22 1651     Place sequential compression device  Until discontinued         04/29/22 1651                Disposition: Remain inpatient, anticipate d/c to SNF early next week    Jolanta Guy PA-C  Bone Marrow Transplant  [facility] - Oncology ([facility])

## 2022-05-13 NOTE — ASSESSMENT & PLAN NOTE
- Suspect insetting of hypovolemia  - Mild, Na 133 today.   - Serum osm/urine osm normal.   - Resumed IVF given poor PO intake

## 2022-05-13 NOTE — PT/OT/SLP PROGRESS
Physical Therapy Treatment    Patient Name:  Susan Puente   MRN:  [id number]    Recommendations:     Discharge Recommendations:  nursing facility, skilled   Discharge Equipment Recommendations: other (see comments) (tbd)   Barriers to discharge: Decreased caregiver support and requires increased assistance    Assessment:     Susan Puente is a 71 y.o. female admitted with a medical diagnosis of Acute respiratory failure with hypoxia.  She presents with the following impairments/functional limitations:  weakness, impaired endurance, impaired self care skills, impaired functional mobilty, gait instability, impaired balance, decreased coordination, decreased lower extremity function. Susan Puente would benefit from acute PT intervention to address listed functional deficits, provide patient/caregiver education, reduce fall risk, and maximize (I) and safety with functional mobility.    Pt continues to make progress towards goals , but also presents with significant functional mobility deficits and is functioning below baseline. Pt able to perform multiple sit to stand transfers on this date with Patricia, and was able to perform a bed to bedside chair transfer with Patricia using a step pivot technique. Pt increasingly less limited by pain. Pt will continue to benefit from acute PT services in order to maximize safety and (I) with functional mobility.    After hospital discharge, pt would benefit from SNF to continue addressing therapy impairments.    Rehab Prognosis: Good; patient would benefit from acute skilled PT services to address these deficits and reach maximum level of function.      Plan:     During this hospitalization, patient to be seen 3 x/week to address the identified rehab impairments via gait training, therapeutic activities, therapeutic exercises, neuromuscular re-education and progress toward the following goals:    · Plan of Care Expires:  05/30/22    This plan of care has been discussed  "with the patient, who was included in its development and is in agreement with the identified goals and treatment plan.     Subjective     Communicated with RN prior to session.  Patient agreeable to participate.     Chief Complaint: "I dream of being able to walk."  Patient/Family Comments/goals: "I cant wait until I can go to the bathroom by myself."    Objective:     Patient found HOB elevated with telemetry, PureWick, pulse ox (continuous), oxygen  upon PT entry to room.    General Precautions: Standard, fall, respiratory   Orthopedic Precautions:N/A   Braces: N/A       Functional Mobility:    Bed Mobility:  · Supine to Sit: Minimal Assistance  · Scooting anteriorly to EOB to plant feet on floor: Stand-by Assistance  · Scooting laterally along HOB R to L: Minimal Assistance   · Cues for anterior weight shifting to clear buttocks    Transfers:   · Sit to Stand Transfer: Minimal Assistance  from EOB with HHA x 2  · Performed 2 trials from EOB   · Bed to Chair: Minimal Assistance with HHA x 2 using step transfer technique              Gait:  · Patient received gait training ~6 feet with Minimal Assistance and HHA x 2  · Gait Assessment: decreased speed, step length, swing through, heel strike, forward flexed posture, and downward gaze.   · Pt taking lateral steps to R along EOB and to bedside chair  · Cues to increase lateral weight shifting to improve limb advancement  · Posterior lean noted throughout    Balance:  · Static Sit: Stand-By Assist at EOB x ~15 minutes  · Static Stand: Mod Assist progressed to Suki with cues with Hand-held assist x 2      Therapeutic Activities/Exercises     Patient educated on the importance of early mobility to prevent functional decline during hospital stay    Patient was instructed to utilize staff assistance for mobility/transfers.  Patient was educated on PT POC and all questions answered within PT scope of practice.    Patient able to verbalize understanding; will follow-up " with pt during current admit for additional questions/concerns within scope of practice.      AM-PAC 6 CLICK MOBILITY  Total Score:15     Patient left up in chair with all lines intact, call button in reach and RN and OT notified.        History/Goals:     PAST MEDICAL HISTORY:  Past Medical History:   Diagnosis Date    Allergic rhinitis     Allergy     Anemia     Anxiety     CAD (coronary artery disease)     Carotid stenosis     Colon polyps 2016    Controlled type 2 diabetes mellitus without complication, without long-term current use of insulin 10/19/2016    Depression     GERD (gastroesophageal reflux disease)     Hearing loss in right ear     Hx of psychiatric care     Celexa, Prozac    Hypokalemia 2/3/2020    MDS (myelodysplastic syndrome) with 5q deletion     Psychiatric problem     Therapy        Past Surgical History:   Procedure Laterality Date    BONE MARROW BIOPSY Left 6/7/2018    Procedure: Biopsy-bone marrow;  Surgeon: Gita Valdes MD;  [facility];  Service: Oncology;  Laterality: Left;    BONE MARROW BIOPSY Left 3/21/2019    Procedure: Biopsy-bone marrow;  Surgeon: Gita Valdes MD;  [facility];  Service: Oncology;  Laterality: Left;    BONE MARROW BIOPSY Left 10/24/2019    Procedure: Biopsy-bone marrow;  Surgeon: Gita Valdes MD;  [facility];  Service: Oncology;  Laterality: Left;  left iliac crest    BONE MARROW BIOPSY Left 4/21/2021    Procedure: Biopsy-bone marrow;  Surgeon: Gita Valdes MD;  Location: [facility] (4TH FLR);  Service: Oncology;  Laterality: Left;    BUNIONECTOMY      CAROTID ENDARTERECTOMY Left 2011    CAROTID STENT      COLONOSCOPY N/A 12/20/2016    Procedure: COLONOSCOPY;  Surgeon: PATRICIA Simpson MD;  Location: [facility] ENDO (4TH FLR);  Service: Endoscopy;  Laterality: N/A;  pt has vomiting with anesthesia in past    ENDOMETRIAL ABLATION      for endometriosis    INSERTION OF TUNNELED CENTRAL VENOUS CATHETER (CVC)  WITH SUBCUTANEOUS PORT N/A 2020    Procedure: [id number]-[id number];  Surgeon: Deepa Diagnostic Provider;  Location: [facility] OR [address] [place] [address];  Service: Radiology;  Laterality: N/A;  189    TONSILLECTOMY      TYMPANOSTOMY TUBE PLACEMENT         GOALS:   Multidisciplinary Problems     Physical Therapy Goals        Problem: Physical Therapy    Goal Priority Disciplines Outcome Goal Variances Interventions   Physical Therapy Goal     PT, PT/OT Ongoing, Progressing     Description: Goals to be met by: 22, updated on 22    Patient will increase functional independence with mobility by performin. Supine to sit with modified independence  2. Sit to supine with modified independence  3. Sit to stand transfer with supervision  4. Bed to chair transfer with contact guard assistance using LRAD as needed  5. Gait  x 10 feet with minimum assistance using LRAD as needed  6. Ascend/descend 1 flight of stair with appropriate handrails minimum assistance using LRAD as needed  7. Lower extremity exercise program x10 reps per handout, with IND                     Time Tracking:     PT Received On: 22  PT Start Time: 1105     PT Stop Time: 1143  PT Total Time (min): 38 min     Billable Minutes: Gait Training 8, Therapeutic Activity 15 and Neuromuscular Re-education 15      Isacc Mccollum, PT  2022

## 2022-05-13 NOTE — ASSESSMENT & PLAN NOTE
Patient admitted with acute hypoxic respiratory failure, she was treated for PNA during recent admission and was initiated on home oxygen after 6 min walk test previous admission.  - CXR with pulmonary consolidation and fever on admission, recently completed levaquin/azithro course for CAP.   - Started on Cefepime on admission, further infectious work up unrevealing and afebrile >48 hours and Cefepime discontinued 5/3. Given increasing O2 requirements over the weekend with persistent RML consolidation, Cefepime resumed 5/8.   - Continue scheduled DuoNebs  - 5/6 CT Chest: Increased RML Consolidation  - 5/7 CT-A: Negative for PE; ABG with hypoxemia.   - 5/9: Pulmonary consulted given increasing O2 requirements.   - S/p Bronch 5/11 AM. AFB/KOH staining was negative. Gram stain negative. Remaining cultures/studies NGTD. Bronch cytology with macrophages and inflammation.  - Plan to start Prednisone taper 60mg x 2wks, then taper down by 10mg every two weeks until complete.  - Given prolonged steroid taper, will continue GI ppx and will need PJP ppx. Will discuss with outpt hematologist PJP ppx preference given her cytopenias.   - Will f/u with pulm outpatient

## 2022-05-13 NOTE — PLAN OF CARE
No acute events or changes overnight. Patient without complaint throughout the night. Patient resting comfortably and in no distress at this time.

## 2022-05-13 NOTE — PLAN OF CARE
Patient is alert and oriented x 4 with call light and personal items within reach. Patient has been educated to call for assistance with ADL's.     Patient is on 5.5 L BNC  Phos 2.4 (po replacement given)  MD ordered 1 unit of P RBC's to be given: ( blood bank just called at approx. and said because of the patient's antibodies she has to order the blood and it may not be here until tonight or tomorrow.).    PT/OT Recommendations:   Discharge Recommendations:  nursing facility, skilled       Attending's Plan:    Acute respiratory failure....  - 5/9: Pulmonary consulted given increasing O2 requirements.   - S/p Bronch 5/11 AM. AFB/KOH staining was negative. Gram stain negative. Remaining cultures/studies NGTD. Bronch cytology with macrophages and inflammation.  - Plan to start Prednisone taper 60mg x 2wks, then taper down by 10mg every two weeks until complete.  - Given prolonged steroid taper, will continue GI ppx and will need PJP ppx. Will discuss with outpt hematologist PJP ppx preference given her cytopenias.   - Will f/u with pulm outpatient ....    Disposition: Remain inpatient, anticipate d/c to SNF early next week    SHABBIR: 5/17/22

## 2022-05-13 NOTE — MEDICAL/APP STUDENT
"[facility]  Progress Note    Patient Name: Susan Puente  MRN: [id number]  Patient Class: IP- Inpatient   Admission Date: 4/29/2022  Length of Stay: 11 days  Attending Physician: Rory Sotomayor MD  Primary Care Provider: Claudia Rapp MD    Subjective:     Principal Problem: Acute gout of foot    Chief Complaint:   Chief Complaint   Patient presents with    Bilateral foot pain      Was d/c'ed 3 days ago for pneumonia; pt's family called EMS bc "they can't take care of her bc they are leaving for vacation". Pt usually uses walker to ambulate, states "I can't walk bc my feet hurt".        HPI: Ms. Susan Puente is a 71 y.o F with MDS on treatment with Vidaza (currently receiving decitabine due to national Vidaza shortage), NIDDM2, HTN, CAD, anxiety, depression, and gout. She was recently admitted 4/23/22 to 4/27/22 with CAP. She completed a course of Azithromycin and was discharged with home health, oxygen, and a 7 day course of Levaquin. She re-presented to the ED on 4/29 complaining of foot pain making it difficult to ambulate at home. Redness noted to bilateral great toes and X-ray showing soft tissue edema suggestive of gout to right foot and bunion to left foot.     Pulmonology consulted for increased oxygen requirements and evaluation of worsening RLL consolidative process on CT s/p treatment for CAP.      Hospital Course: No notes on file    Interval History: Pt reports the she started coughing up bloody tinged mucus every 20 minutes since 0700.     Past Medical History:   Diagnosis Date    Allergic rhinitis     Allergy     Anemia     Anxiety     CAD (coronary artery disease)     Carotid stenosis     Colon polyps 2016    Controlled type 2 diabetes mellitus without complication, without long-term current use of insulin 10/19/2016    Depression     GERD (gastroesophageal reflux disease)     Hearing loss in right ear     Hx of psychiatric care     Celexa, Prozac    " Hypokalemia 2/3/2020    MDS (myelodysplastic syndrome) with 5q deletion     Psychiatric problem     Therapy        Past Surgical History:   Procedure Laterality Date    BONE MARROW BIOPSY Left 6/7/2018    Procedure: Biopsy-bone marrow;  Surgeon: Gita Valdes MD;  [facility];  Service: Oncology;  Laterality: Left;    BONE MARROW BIOPSY Left 3/21/2019    Procedure: Biopsy-bone marrow;  Surgeon: Gita Valdes MD;  [facility];  Service: Oncology;  Laterality: Left;    BONE MARROW BIOPSY Left 10/24/2019    Procedure: Biopsy-bone marrow;  Surgeon: Gita Valdes MD;  Location: [facility] OR [facility] F[facility];  Service: Oncology;  Laterality: Left;  left iliac crest    BONE MARROW BIOPSY Left 4/21/2021    Procedure: Biopsy-bone marrow;  Surgeon: Gita Valdes MD;  Location: [facility] ENDO (4TH FLR);  Service: Oncology;  Laterality: Left;    BUNIONECTOMY      CAROTID ENDARTERECTOMY Left 2011    CAROTID STENT      COLONOSCOPY N/A 12/20/2016    Procedure: COLONOSCOPY;  Surgeon: PATRICIA Simpson MD;  Location: [facility] ENDO (4TH FLR);  Service: Endoscopy;  Laterality: N/A;  pt has vomiting with anesthesia in past    ENDOMETRIAL ABLATION      for endometriosis    INSERTION OF TUNNELED CENTRAL VENOUS CATHETER (CVC) WITH SUBCUTANEOUS PORT N/A 2/19/2020    Procedure: [id number]-CATH;  Surgeon: Deepa Diagnostic Provider;  [facility];  Service: Radiology;  Laterality: N/A;  189    TONSILLECTOMY      TYMPANOSTOMY TUBE PLACEMENT         Review of patient's allergies indicates:   Allergen Reactions    Revlimid [lenalidomide] Swelling     Facial swelling  6/8/17 - in the process of desensitation       No current facility-administered medications on file prior to encounter.     Current Outpatient Medications on File Prior to Encounter   Medication Sig    acetaminophen (TYLENOL) 650 MG TbSR Take 1,300 mg by mouth daily as needed (Headache).    acyclovir (ZOVIRAX) 400 MG tablet Take 1 tablet (400 mg  total) by mouth 2 (two) times daily.    aspirin (ECOTRIN) 81 MG EC tablet Take 1 tablet (81 mg total) by mouth once daily.    azacitidine (VIDAZA INJ) Inject as directed every 28 days.    carvediloL (COREG) 12.5 MG tablet Take 1 tablet (12.5 mg total) by mouth 2 (two) times daily with meals.    ciprofloxacin HCl (CIPRO) 500 MG tablet Take 1 tablet (500 mg total) by mouth 2 (two) times daily.    citalopram (CELEXA) 20 MG tablet Take 1 tablet (20 mg total) by mouth once daily.    evolocumab (REPATHA SURECLICK) 140 mg/mL PnIj Inject 140mg (1 pen) into the skin every 2 weeks. (Patient taking differently: Inject 140mg (1 pen) into the skin every 2 weeks.)    fluconazole (DIFLUCAN) 200 MG Tab Take 2 tablets (400 mg total) by mouth once daily.    ketotifen (ZADITOR) 0.025 % (0.035 %) ophthalmic solution Place 2-3 drops into both eyes daily as needed (Allergies).    lactose-reduced food (ENSURE ORAL) Take by mouth daily as needed (supplement).    lisinopriL-hydrochlorothiazide (PRINZIDE,ZESTORETIC) 20-12.5 mg per tablet Take 2 tablets by mouth once daily.    loperamide (IMODIUM) 2 mg capsule Take 4 mg by mouth daily as needed for Diarrhea.    loratadine (CLARITIN) 10 mg tablet Take 10 mg by mouth daily as needed.     magnesium oxide (MAGOX) 400 mg (241.3 mg magnesium) tablet Take 1 tablet (400 mg total) by mouth once daily.    melatonin (MELATIN) 5 mg Take 10 mg by mouth every evening.    MULTIVITAMIN ORAL Take 1 tablet by mouth once daily.    pantoprazole (PROTONIX) 40 MG tablet Take 1 tablet (40 mg total) by mouth once daily.    polyethylene glycol (MIRALAX) 17 gram PwPk Take 17 g by mouth daily as needed (Constipation).    potassium chloride SA (K-DUR,KLOR-CON) 20 MEQ tablet Take 1 tablet (20 mEq total) by mouth once daily.    deferasirox (EXJADE) 500 MG disintegrating tablet Take 3 tablets (1,500 mg total) by mouth before breakfast.    lancets 30 gauge Misc Use to test blood sugar daily (Patient  taking differently: Use to test blood sugar daily)     Family History     Problem Relation (Age of Onset)    Alcohol abuse Father, Brother    Cancer Paternal Grandmother    Heart disease Mother, Father    Hyperlipidemia Mother        Tobacco Use    Smoking status: Former Smoker     Packs/day: 1.50     Years: 50.00     Pack years: 75.00     Types: Cigarettes, Vaping with nicotine     Quit date: 3/3/2017     Years since quittin.1    Smokeless tobacco: Never Used   Substance and Sexual Activity    Alcohol use: No    Drug use: No    Sexual activity: Not on file     Review of Systems   Constitutional: Positive for activity change and fatigue.   HENT: Negative.    Eyes: Negative.    Respiratory: Positive for shortness of breath.    Cardiovascular: Negative.    Gastrointestinal: Negative.    Endocrine: Negative.    Genitourinary: Negative.    Musculoskeletal: Positive for arthralgias.   Skin: Negative.    Allergic/Immunologic: Positive for immunocompromised state.   Neurological: Negative.    Hematological: Negative.    Psychiatric/Behavioral: Negative.      Objective:     Vital Signs (Most Recent):  Temp: 98.3 °F (36.8 °C) (22)  Pulse: 84 (22)  Resp: 17 (22)  BP: (!) 155/66 (22)  SpO2: (!) 93 % (22) Vital Signs (24h Range):  Temp:  [98.1 °F (36.7 °C)-98.6 °F (37 °C)] 98.3 °F (36.8 °C)  Pulse:  [64-84] 84  Resp:  [15-19] 17  SpO2:  [86 %-96 %] 93 %  BP: (114-155)/(56-77) 155/66     Weight: 69.8 kg (153 lb 14.1 oz)  Body mass index is 25.61 kg/m².    Intake/Output Summary (Last 24 hours) at 2022 0834  Last data filed at 2022 1728  Gross per 24 hour   Intake 1077.11 ml   Output 1900 ml   Net -822.89 ml      Physical Exam  Constitutional:       Appearance: Normal appearance.   HENT:      Head: Normocephalic and atraumatic.      Nose: Nose normal.      Mouth/Throat:      Mouth: Mucous membranes are moist.      Pharynx: Oropharynx is clear.   Eyes:       Extraocular Movements: Extraocular movements intact.      Conjunctiva/sclera: Conjunctivae normal.      Pupils: Pupils are equal, round, and reactive to light.   Cardiovascular:      Rate and Rhythm: Normal rate and regular rhythm.      Pulses: Normal pulses.      Heart sounds: Normal heart sounds.   Pulmonary:      Effort: Pulmonary effort is normal.      Breath sounds: Wheezing (LLL) and rales (Diffuse, bilaterally) present.   Abdominal:      General: Abdomen is flat. Bowel sounds are normal. There is no distension.      Palpations: Abdomen is soft.      Tenderness: There is no abdominal tenderness. There is no guarding.   Musculoskeletal:         General: Tenderness (R hand, bilateral knees, bilateral great toes) present.      Cervical back: Normal range of motion and neck supple.   Skin:     General: Skin is warm and dry.      Capillary Refill: Capillary refill takes 2 to 3 seconds.      Coloration: Skin is pale.      Comments: R  Chest wall port c/d/i   Neurological:      General: No focal deficit present.      Mental Status: She is alert and oriented to person, place, and time. Mental status is at baseline.         Significant Labs:   All pertinent labs within the past 24 hours have been reviewed.  CBC:   Recent Labs   Lab 05/12/22  0343 05/13/22  0354   WBC 8.09 7.57   HGB 7.5* 7.0*   HCT 23.1* 22.4*   * 110*     CMP:   Recent Labs   Lab 05/12/22  0343 05/13/22  0354   * 133*   K 3.3* 4.0   CL 90* 96   CO2 32* 31*    110   BUN 26* 18   CREATININE 0.7 0.6   CALCIUM 8.5* 8.5*   PROT 5.5* 5.2*   ALBUMIN 1.9* 1.8*   BILITOT 0.6 0.5   ALKPHOS 86 95   AST 68* 55*   ALT 81* 66*   ANIONGAP 8 6*   EGFRNONAA >60.0 >60.0     Respiratory Culture:   Recent Labs   Lab 05/11/22  0856   GSRESP No WBC's  No organisms seen   RESPIRATORYC Normal respiratory charlette       Significant Imaging: I have reviewed all pertinent imaging results/findings within the past 24 hours.        Assessment/Plan:      Acute  respiratory failure with hypoxia  Patient admitted with acute hypoxic respiratory failure, she was treated for CAP with Azithromycin during 4/23-4/27 admission and a 7 day course of Levaquin, which she completed on 4/30.  - CXR with pulmonary consolidation and fever on admission  - Started on Cefepime further infectious work up unrevealing and afebrile >48 hours and Cefepime discontinued on 5/3. Given increasing O2 requirements over the weekend with persistent RML consolidation, Cefepime resumed 5/8.   - Continue scheduled DuoNebs  - CT chest significant for worsening of RLL consolidation.    - Concern for either untreated infection or alternative etiology including  or other inflammatory process.   - Pt tolerated bronchoscopy on 5/11 well. Cytology: 1 - Negative for malignant cells.  Benign bronchial cells. Inflammation and macrophages present.  AFB/KOH staining negative. Gram stain negative. AFB and bacterial/fungal cultures no growth to date.  - Pt on 5L NC, O2 sats 86-88%, increased to 5.5L NC  - At d/c, start 60mg Prednisone qd for 2 weeks then taper down by 10mg q2 weeks until completion  - & Bactrim for PJP prophylaxis 1 tab DS M/W/F  - f/u in clinic 2-4 weeks        Active Diagnoses:    Diagnosis Date Noted POA    PRINCIPAL PROBLEM:  Acute respiratory failure with hypoxia [J96.01] 04/26/2022 Yes    Hyponatremia [E87.1] 05/05/2022 No    Acute gout of foot [M10.9] 04/29/2022 Yes    Physical debility [R53.81] 04/29/2022 Yes    Pancytopenia due to antineoplastic chemotherapy [D61.810, T45.1X5A] 04/26/2022 Yes    CAP (community acquired pneumonia) [J18.9] 04/24/2022 Yes    Controlled type 2 diabetes mellitus without complication, without long-term current use of insulin [E11.9] 07/17/2021 Yes    Insomnia [G47.00] 01/06/2020 Yes    MDS (myelodysplastic syndrome) [D46.9] 06/07/2018 Yes    Adjustment disorder with mixed anxiety and depressed mood [F43.23] 03/05/2018 Yes    Essential hypertension [I10]  12/01/2016 Yes    CAD (coronary artery disease) [I25.10]  Yes      Problems Resolved During this Admission:    Diagnosis Date Noted Date Resolved POA    Poor appetite [R63.0] 05/05/2022 05/07/2022 Yes    Fever [R50.9] 05/01/2022 05/07/2022 Yes     VTE Risk Mitigation (From admission, onward)         Ordered     IP VTE HIGH RISK PATIENT  Once         04/29/22 1651     Place sequential compression device  Until discontinued         04/29/22 1651                   Braden Orantes, MS-4  [facility] - [facility]

## 2022-05-14 NOTE — PLAN OF CARE
No acute events or changes overnight. Patient without complaint throughout the night. 1uPRBC currently infusing without issue.

## 2022-05-14 NOTE — PLAN OF CARE
Patient is alert and oriented x 4 with call light and personal items within reach. Patient has been educated to call for assistance with ADL's.     Hgb 6.8  Hct 21.9  Patient is on 3 L BNC  Patient received 1 unit of PRBC's this morning.      PT/OT Recommendations:   Discharge Recommendations:  nursing facility, skilled         Attending's Plan:    Acute respiratory failure....  - 5/9: Pulmonary consulted given increasing O2 requirements.   - S/p Bronch 5/11 AM. AFB/KOH staining was negative. Gram stain negative. Remaining cultures/studies NGTD. Bronch cytology with macrophages and inflammation.  - Plan to start Prednisone taper 60mg x 2wks, then taper down by 10mg every two weeks until complete.  - Given prolonged steroid taper, will continue GI ppx and will need PJP ppx. Will discuss with outpt hematologist PJP ppx preference given her cytopenias.   - Will f/u with pulm outpatient ....     Disposition: Remain inpatient, anticipate d/c to SNF early next week     SHABBIR: 5/17/22

## 2022-05-14 NOTE — SUBJECTIVE & OBJECTIVE
Subjective:     Interval History: No events overnight. Transfused one unit pRBCs. No new complaints.     Objective:     Vital Signs (Most Recent):  Temp: 97.6 °F (36.4 °C) (05/14/22 1520)  Pulse: 80 (05/14/22 1520)  Resp: (!) 22 (05/14/22 1520)  BP: (!) 141/80 (05/14/22 1520)  SpO2: (!) 94 % (05/14/22 1520)   Vital Signs (24h Range):  Temp:  [96.3 °F (35.7 °C)-98.1 °F (36.7 °C)] 97.6 °F (36.4 °C)  Pulse:  [74-83] 80  Resp:  [16-22] 22  SpO2:  [92 %-95 %] 94 %  BP: (123-151)/(57-80) 141/80     Weight: 69.8 kg (153 lb 14.1 oz)  Body mass index is 25.61 kg/m².  Body surface area is 1.79 meters squared.    ECOG SCORE           [unfilled]    Intake/Output - Last 3 Shifts         05/12 0700  05/13 0659 05/13 0700  05/14 0659 05/14 0700  05/15 0659    P.O. 400      I.V. (mL/kg) 380.7 (5.5) 3480.9 (49.9)     Blood   350    IV Piggyback 296.4      Total Intake(mL/kg) 1077.1 (15.4) 3480.9 (49.9) 350 (5)    Urine (mL/kg/hr) 2200 (1.3) 1050 (0.6) 1550 (2.3)    Stool 0      Total Output 2200 1050 1550    Net -1122.9 +2430.9 -1200           Stool Occurrence 0 x 0 x             Physical Exam  Constitutional:       General: She is not in acute distress.     Appearance: She is well-developed. She is not diaphoretic.   HENT:      Head: Normocephalic and atraumatic.   Eyes:      General: No scleral icterus.     Conjunctiva/sclera: Conjunctivae normal.   Cardiovascular:      Rate and Rhythm: Normal rate and regular rhythm.      Heart sounds: Normal heart sounds. No murmur heard.    No friction rub. No gallop.   Pulmonary:      Effort: Pulmonary effort is normal. No respiratory distress.      Breath sounds: Normal breath sounds. No wheezing or rales.   Abdominal:      General: Bowel sounds are normal. There is no distension.      Palpations: Abdomen is soft.      Tenderness: There is no abdominal tenderness. There is no guarding.   Musculoskeletal:         General: No tenderness. Normal range of motion.      Cervical back: Normal range  of motion and neck supple.   Skin:     General: Skin is warm and dry.      Findings: No rash.   Neurological:      General: No focal deficit present.      Mental Status: She is alert and oriented to person, place, and time.   Psychiatric:         Behavior: Behavior normal.       Significant Labs:   CBC:   Recent Labs   Lab 05/13/22  0354 05/14/22  0328   WBC 7.57 6.01   HGB 7.0* 6.8*   HCT 22.4* 21.9*   * 91*    and CMP:   Recent Labs   Lab 05/13/22 0354 05/14/22  0328   * 136   K 4.0 4.0   CL 96 99   CO2 31* 30*    154*   BUN 18 17   CREATININE 0.6 0.7   CALCIUM 8.5* 8.4*   PROT 5.2* 5.3*   ALBUMIN 1.8* 1.8*   BILITOT 0.5 0.4   ALKPHOS 95 91   AST 55* 49*   ALT 66* 62*   ANIONGAP 6* 7*   EGFRNONAA >60.0 >60.0       Diagnostic Results:  I have reviewed all pertinent imaging results/findings within the past 24 hours.

## 2022-05-14 NOTE — PROGRESS NOTES
[facility] ([facility])  Hematology  Bone Marrow Transplant  Progress Note    Patient Name: Susan Puente  Admission Date: 4/29/2022  Hospital Length of Stay: 12 days  Code Status: Full Code    Subjective:     Interval History: No events overnight. Transfused one unit pRBCs. No new complaints.     Objective:     Vital Signs (Most Recent):  Temp: 97.6 °F (36.4 °C) (05/14/22 1520)  Pulse: 80 (05/14/22 1520)  Resp: (!) 22 (05/14/22 1520)  BP: (!) 141/80 (05/14/22 1520)  SpO2: (!) 94 % (05/14/22 1520)   Vital Signs (24h Range):  Temp:  [96.3 °F (35.7 °C)-98.1 °F (36.7 °C)] 97.6 °F (36.4 °C)  Pulse:  [74-83] 80  Resp:  [16-22] 22  SpO2:  [92 %-95 %] 94 %  BP: (123-151)/(57-80) 141/80     Weight: 69.8 kg (153 lb 14.1 oz)  Body mass index is 25.61 kg/m².  Body surface area is 1.79 meters squared.    ECOG SCORE           [unfilled]    Intake/Output - Last 3 Shifts         05/12 0700  05/13 0659 05/13 0700  05/14 0659 05/14 0700  05/15 0659    P.O. 400      I.V. (mL/kg) 380.7 (5.5) 3480.9 (49.9)     Blood   350    IV Piggyback 296.4      Total Intake(mL/kg) 1077.1 (15.4) 3480.9 (49.9) 350 (5)    Urine (mL/kg/hr) 2200 (1.3) 1050 (0.6) 1550 (2.3)    Stool 0      Total Output 2200 1050 1550    Net -1122.9 +2430.9 -1200           Stool Occurrence 0 x 0 x             Physical Exam  Constitutional:       General: She is not in acute distress.     Appearance: She is well-developed. She is not diaphoretic.   HENT:      Head: Normocephalic and atraumatic.   Eyes:      General: No scleral icterus.     Conjunctiva/sclera: Conjunctivae normal.   Cardiovascular:      Rate and Rhythm: Normal rate and regular rhythm.      Heart sounds: Normal heart sounds. No murmur heard.    No friction rub. No gallop.   Pulmonary:      Effort: Pulmonary effort is normal. No respiratory distress.      Breath sounds: Normal breath sounds. No wheezing or rales.   Abdominal:      General: Bowel sounds are normal. There is no distension.       Palpations: Abdomen is soft.      Tenderness: There is no abdominal tenderness. There is no guarding.   Musculoskeletal:         General: No tenderness. Normal range of motion.      Cervical back: Normal range of motion and neck supple.   Skin:     General: Skin is warm and dry.      Findings: No rash.   Neurological:      General: No focal deficit present.      Mental Status: She is alert and oriented to person, place, and time.   Psychiatric:         Behavior: Behavior normal.       Significant Labs:   CBC:   Recent Labs   Lab 05/13/22  0354 05/14/22  0328   WBC 7.57 6.01   HGB 7.0* 6.8*   HCT 22.4* 21.9*   * 91*    and CMP:   Recent Labs   Lab 05/13/22  0354 05/14/22  0328   * 136   K 4.0 4.0   CL 96 99   CO2 31* 30*    154*   BUN 18 17   CREATININE 0.6 0.7   CALCIUM 8.5* 8.4*   PROT 5.2* 5.3*   ALBUMIN 1.8* 1.8*   BILITOT 0.5 0.4   ALKPHOS 95 91   AST 55* 49*   ALT 66* 62*   ANIONGAP 6* 7*   EGFRNONAA >60.0 >60.0       Diagnostic Results:  I have reviewed all pertinent imaging results/findings within the past 24 hours.    Assessment/Plan:     * Acute respiratory failure with hypoxia  Patient admitted with acute hypoxic respiratory failure, she was treated for PNA during recent admission and was initiated on home oxygen after 6 min walk test previous admission.  - CXR with pulmonary consolidation and fever on admission, recently completed levaquin/azithro course for CAP.   - Started on Cefepime on admission, further infectious work up unrevealing and afebrile >48 hours and Cefepime discontinued 5/3. Given increasing O2 requirements over the weekend with persistent RML consolidation, Cefepime resumed 5/8.   - Continue scheduled DuoNebs  - 5/6 CT Chest: Increased RML Consolidation  - 5/7 CT-A: Negative for PE; ABG with hypoxemia.   - 5/9: Pulmonary consulted given increasing O2 requirements.   - S/p Bronch 5/11 AM. AFB/KOH staining was negative. Gram stain negative. Remaining cultures/studies  NGTD. Bronch cytology with macrophages and inflammation.  - Plan to start Prednisone taper 60mg x 2wks, then taper down by 10mg every two weeks until complete.  - Given prolonged steroid taper, will continue GI ppx and will need PJP ppx. Will discuss with outpt hematologist PJP ppx preference given her cytopenias.   - Will f/u with pulm outpatient     Hyponatremia  Resolved  - discontinued IV fluids    Physical debility  - reports difficulty with ambulation due to pain and weakness  - PT/OT consulted. Recommending SNF. Patient has been accepted SNF. Transfer pending bronch results.  - Boost with meals for nutritional support    Acute gout of foot  - patient reports difficulty bearing weight on bilat feet due to pain, improving  - x-ray showing soft tissue edema suggestive of gout flare  - uric acid wnl  - allopurinol 100 mg daily for prevention  - cooling muscle cream PRN for additional pain support     Pancytopenia due to antineoplastic chemotherapy  - Transfuse for hgb < 7 or plts < 10K  - Daily cbc while inpatient  - Continue acyclovir ppx  - Cefepime and fluconazole d/c'd 5/12 given normal WBC and afebrile     CAP (community acquired pneumonia)  - Pt with 2 weeks of productive cough, nasal congestion  - Upon arrival to the hospial she was hypoxic requiring 3L NC, weaned to 2L today  - CXR with likely develop consolidation  - CTA shows New patchy consolidative opacities in the in the right upper and posterior right lower lobes.  Findings suggestive infectious etiology such as pneumonia, multifocal pneumonia.  Also consider atypical given patient's reported immunocompromised status.  Aspiration could present similarly.  Consider continued follow-up after acute clinical illness has resolved to ensure resolution. New mediastinal and bilateral hilar lymphadenopathy, likely reactive.  - completed azithromycin and 7 day course of Levaquin on 4/30.  - CXR on admission showing possible developing consolidation: Cefepime  4/30-5/3    - See acute respiratory failure with hypoxia above    Controlled type 2 diabetes mellitus without complication, without long-term current use of insulin  - Will hold home po diabetic medications  - SSI, ACHS blood glucose monitoring, and diabetic diet  - Goal bg 140-180    Hypokalemia  - K+ 4.0 today  - replacing per prn order set  - daily CMP while inpatient    Insomnia  - Continue nightly melatonin  - Added remeron 5/5    MDS (myelodysplastic syndrome)  - Primary oncologist, Dr. 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 Developed allergy and was then desensitized. Soon after developed pancytopenia and Revlimid was stopped June 2017.    - BMBX on 6/8/17 was with normal cytogenetics. 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 Of 20 metaphases, 5 were normal and 15 had a 5q deletion, NGS positive for SF3B1 and TP53 (12%). 1.4% blasts  - Received vidaza for 22 cycles, received Decitabine for C23 due to national shortage of vidaza and then back to vidaza with C24.  - completed cycle 39 on 4/8/22  - due for cycle 40 on 05/02/2022, postponed whiled admitted and to be readdressed as outpatient     Adjustment disorder with mixed anxiety and depressed mood  - continue home celexa    Essential hypertension  - At home regimen: lisinopril/hctz and coreg  - SBP 170s-180s. Increased Coreg dose to 25mg with minimal improveemt  - Discussed with cardiology who recommended switching HCTZ to Chlorthalidone 25mg  daily and adding Nifedipine XL 60mg with outpatient follow up. Nifedipine was held d/t hypotension and to allow for some permissible hypertension given clinical status  - Current anti-HTN regimen: Lisinopril 40mg daily, Coreg 25mg BID, Chlorthalidone 25mg daily. Nifedipine XL 30mg daily resumed 5/11 due to persistent HTN.  - Will need cardiology f/u outpatient    CAD (coronary artery disease)  - S/P left carotid endarterectomy prior to cancer dx, s/p PCI (3 stents) >20 years ago   - Continue on ASA 81mg daily, platelets are wnl  - On repatha as outpatient (statin intolerant)        VTE Risk Mitigation (From admission, onward)         Ordered     IP VTE HIGH RISK PATIENT  Once         04/29/22 1651     Place sequential compression device  Until discontinued         04/29/22 1651                Disposition: remain inpatient, waiting for SNF    Emili Damon, DO  Bone Marrow Transplant  [facility] ([facility])

## 2022-05-15 NOTE — PROGRESS NOTES
[facility] ([facility]  Hematology  Bone Marrow Transplant  Progress Note    Patient Name: Susan Puente  Admission Date: 4/29/2022  Hospital Length of Stay: 13 days  Code Status: Full Code    Subjective:     Interval History: NAEON.  Feels less swollen today.  Breathing improved.  Denies fevers, chills, ns.    Objective:     Vital Signs (Most Recent):  Temp: 97.6 °F (36.4 °C) (05/15/22 1107)  Pulse: 68 (05/15/22 1107)  Resp: 20 (05/15/22 1107)  BP: (!) 148/66 (05/15/22 1107)  SpO2: (!) 94 % (05/15/22 1107)   Vital Signs (24h Range):  Temp:  [96 °F (35.6 °C)-98.1 °F (36.7 °C)] 97.6 °F (36.4 °C)  Pulse:  [68-83] 68  Resp:  [16-22] 20  SpO2:  [92 %-97 %] 94 %  BP: (125-149)/(60-80) 148/66     Weight: 69.8 kg (153 lb 14.1 oz)  Body mass index is 25.61 kg/m².  Body surface area is 1.79 meters squared.    ECOG SCORE           [unfilled]    Intake/Output - Last 3 Shifts         05/13 0700  05/14 0659 05/14 0700  05/15 0659 05/15 0700  05/16 0659    P.O.       I.V. (mL/kg) 3480.9 (49.9)      Blood  350     IV Piggyback       Total Intake(mL/kg) 3480.9 (49.9) 350 (5)     Urine (mL/kg/hr) 1050 (0.6) 1550 (0.9)     Stool  0     Total Output 1050 1550     Net +2430.9 -1200            Stool Occurrence 0 x 0 x             Physical Exam  Constitutional:       General: She is not in acute distress.     Appearance: She is well-developed. She is not diaphoretic.   HENT:      Head: Normocephalic and atraumatic.   Eyes:      General: No scleral icterus.     Conjunctiva/sclera: Conjunctivae normal.   Cardiovascular:      Rate and Rhythm: Normal rate and regular rhythm.      Heart sounds: Normal heart sounds. No murmur heard.    No friction rub. No gallop.   Pulmonary:      Effort: Pulmonary effort is normal. No respiratory distress.      Breath sounds: Normal breath sounds. No wheezing or rales.   Abdominal:      General: Bowel sounds are normal. There is no distension.      Palpations: Abdomen is soft.      Tenderness:  There is no abdominal tenderness. There is no guarding.   Musculoskeletal:         General: No tenderness. Normal range of motion.      Cervical back: Normal range of motion and neck supple.   Skin:     General: Skin is warm and dry.      Findings: No rash.   Neurological:      General: No focal deficit present.      Mental Status: She is alert and oriented to person, place, and time.   Psychiatric:         Behavior: Behavior normal.       Significant Labs:   CBC:   Recent Labs   Lab 05/14/22 0328 05/15/22  0332   WBC 6.01 6.19   HGB 6.8* 8.0*   HCT 21.9* 25.7*   PLT 91* 128*    and CMP:   Recent Labs   Lab 05/14/22 0328 05/15/22  0332    139   K 4.0 4.0   CL 99 101   CO2 30* 30*   * 196*   BUN 17 25*   CREATININE 0.7 0.7   CALCIUM 8.4* 8.4*   PROT 5.3* 5.5*   ALBUMIN 1.8* 1.9*   BILITOT 0.4 0.3   ALKPHOS 91 93   AST 49* 68*   ALT 62* 85*   ANIONGAP 7* 8   EGFRNONAA >60.0 >60.0       Diagnostic Results:  I have reviewed all pertinent imaging results/findings within the past 24 hours.    Assessment/Plan:     * Acute respiratory failure with hypoxia  Patient admitted with acute hypoxic respiratory failure, she was treated for PNA during recent admission and was initiated on home oxygen after 6 min walk test previous admission.  - CXR with pulmonary consolidation and fever on admission, recently completed levaquin/azithro course for CAP.   - Started on Cefepime on admission, further infectious work up unrevealing and afebrile >48 hours and Cefepime discontinued 5/3. Given increasing O2 requirements over the weekend with persistent RML consolidation, Cefepime resumed 5/8.   - Continue scheduled DuoNebs  - 5/6 CT Chest: Increased RML Consolidation  - 5/7 CT-A: Negative for PE; ABG with hypoxemia.   - 5/9: Pulmonary consulted given increasing O2 requirements.   - S/p Bronch 5/11 AM. AFB/KOH staining was negative. Gram stain negative. Remaining cultures/studies NGTD. Bronch cytology with macrophages and  inflammation.  - Plan to start Prednisone taper 60mg x 2wks, then taper down by 10mg every two weeks until complete.  - Given prolonged steroid taper, will continue GI ppx and will need PJP ppx. Will discuss with outpt hematologist PJP ppx preference given her cytopenias.   - Will f/u with pulm outpatient     Hyponatremia  Resolved  - discontinued IV fluids    Physical debility  - reports difficulty with ambulation due to pain and weakness  - PT/OT consulted. Recommending SNF. Patient has been accepted SNF. Transfer pending bronch results.  - Boost with meals for nutritional support    Acute gout of foot  - patient reports difficulty bearing weight on bilat feet due to pain, improving  - x-ray showing soft tissue edema suggestive of gout flare  - uric acid wnl  - allopurinol 100 mg daily for prevention  - cooling muscle cream PRN for additional pain support     Pancytopenia due to antineoplastic chemotherapy  - Transfuse for hgb < 7 or plts < 10K  - Daily cbc while inpatient  - Continue acyclovir ppx  - Cefepime and fluconazole d/c'd 5/12 given normal WBC and afebrile     CAP (community acquired pneumonia)  - Pt with 2 weeks of productive cough, nasal congestion  - Upon arrival to the hospial she was hypoxic requiring 3L NC, weaned to 2L today  - CXR with likely develop consolidation  - CTA shows New patchy consolidative opacities in the in the right upper and posterior right lower lobes.  Findings suggestive infectious etiology such as pneumonia, multifocal pneumonia.  Also consider atypical given patient's reported immunocompromised status.  Aspiration could present similarly.  Consider continued follow-up after acute clinical illness has resolved to ensure resolution. New mediastinal and bilateral hilar lymphadenopathy, likely reactive.  - completed azithromycin and 7 day course of Levaquin on 4/30.  - CXR on admission showing possible developing consolidation: Cefepime 4/30-5/3    - See acute respiratory failure  with hypoxia above    Controlled type 2 diabetes mellitus without complication, without long-term current use of insulin  - Will hold home po diabetic medications  - SSI, ACHS blood glucose monitoring, and diabetic diet  - Goal bg 140-180    Hypokalemia  - K+ 4.0 today  - replacing per prn order set  - daily CMP while inpatient    Insomnia  - Continue nightly melatonin  - Added remeron 5/5    MDS (myelodysplastic syndrome)  - Primary oncologist, Dr. 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 Developed allergy and was then desensitized. Soon after developed pancytopenia and Revlimid was stopped June 2017.    - BMBX on 6/8/17 was with normal cytogenetics. 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 Of 20 metaphases, 5 were normal and 15 had a 5q deletion, NGS positive for SF3B1 and TP53 (12%). 1.4% blasts  - Received vidaza for 22 cycles, received Decitabine for C23 due to national shortage of vidaza and then back to vidaza with C24.  - completed cycle 39 on 4/8/22  - due for cycle 40 on 05/02/2022, postponed whiled admitted and to be readdressed as outpatient     Adjustment disorder with mixed anxiety and depressed mood  - continue home celexa    Essential hypertension  - At home regimen: lisinopril/hctz and coreg  - SBP 170s-180s. Increased Coreg dose to 25mg with minimal improveemt  - Discussed with cardiology who recommended switching HCTZ to Chlorthalidone 25mg daily and adding Nifedipine XL 60mg with  outpatient follow up. Nifedipine was held d/t hypotension and to allow for some permissible hypertension given clinical status  - Current anti-HTN regimen: Lisinopril 40mg daily, Coreg 25mg BID, Chlorthalidone 25mg daily. Nifedipine XL 30mg daily resumed 5/11 due to persistent HTN.  - Will need cardiology f/u outpatient    CAD (coronary artery disease)  - S/P left carotid endarterectomy prior to cancer dx, s/p PCI (3 stents) >20 years ago   - Continue on ASA 81mg daily, platelets are wnl  - On repatha as outpatient (statin intolerant)        VTE Risk Mitigation (From admission, onward)         Ordered     IP VTE HIGH RISK PATIENT  Once         04/29/22 1651     Place sequential compression device  Until discontinued         04/29/22 1651                Disposition: pending snf placement, likely early this week    Rudi Chun MD  Bone Marrow Transplant  [facility] ([facility])

## 2022-05-15 NOTE — SUBJECTIVE & OBJECTIVE
Subjective:     Interval History: NAEON.  Feels less swollen today.  Breathing improved.  Denies fevers, chills, ns.    Objective:     Vital Signs (Most Recent):  Temp: 97.6 °F (36.4 °C) (05/15/22 1107)  Pulse: 68 (05/15/22 1107)  Resp: 20 (05/15/22 1107)  BP: (!) 148/66 (05/15/22 1107)  SpO2: (!) 94 % (05/15/22 1107)   Vital Signs (24h Range):  Temp:  [96 °F (35.6 °C)-98.1 °F (36.7 °C)] 97.6 °F (36.4 °C)  Pulse:  [68-83] 68  Resp:  [16-22] 20  SpO2:  [92 %-97 %] 94 %  BP: (125-149)/(60-80) 148/66     Weight: 69.8 kg (153 lb 14.1 oz)  Body mass index is 25.61 kg/m².  Body surface area is 1.79 meters squared.    ECOG SCORE           [unfilled]    Intake/Output - Last 3 Shifts         05/13 0700  05/14 0659 05/14 0700  05/15 0659 05/15 0700  05/16 0659    P.O.       I.V. (mL/kg) 3480.9 (49.9)      Blood  350     IV Piggyback       Total Intake(mL/kg) 3480.9 (49.9) 350 (5)     Urine (mL/kg/hr) 1050 (0.6) 1550 (0.9)     Stool  0     Total Output 1050 1550     Net +2430.9 -1200            Stool Occurrence 0 x 0 x             Physical Exam  Constitutional:       General: She is not in acute distress.     Appearance: She is well-developed. She is not diaphoretic.   HENT:      Head: Normocephalic and atraumatic.   Eyes:      General: No scleral icterus.     Conjunctiva/sclera: Conjunctivae normal.   Cardiovascular:      Rate and Rhythm: Normal rate and regular rhythm.      Heart sounds: Normal heart sounds. No murmur heard.    No friction rub. No gallop.   Pulmonary:      Effort: Pulmonary effort is normal. No respiratory distress.      Breath sounds: Normal breath sounds. No wheezing or rales.   Abdominal:      General: Bowel sounds are normal. There is no distension.      Palpations: Abdomen is soft.      Tenderness: There is no abdominal tenderness. There is no guarding.   Musculoskeletal:         General: No tenderness. Normal range of motion.      Cervical back: Normal range of motion and neck supple.   Skin:      General: Skin is warm and dry.      Findings: No rash.   Neurological:      General: No focal deficit present.      Mental Status: She is alert and oriented to person, place, and time.   Psychiatric:         Behavior: Behavior normal.       Significant Labs:   CBC:   Recent Labs   Lab 05/14/22 0328 05/15/22  0332   WBC 6.01 6.19   HGB 6.8* 8.0*   HCT 21.9* 25.7*   PLT 91* 128*    and CMP:   Recent Labs   Lab 05/14/22  0328 05/15/22  0332    139   K 4.0 4.0   CL 99 101   CO2 30* 30*   * 196*   BUN 17 25*   CREATININE 0.7 0.7   CALCIUM 8.4* 8.4*   PROT 5.3* 5.5*   ALBUMIN 1.8* 1.9*   BILITOT 0.4 0.3   ALKPHOS 91 93   AST 49* 68*   ALT 62* 85*   ANIONGAP 7* 8   EGFRNONAA >60.0 >60.0       Diagnostic Results:  I have reviewed all pertinent imaging results/findings within the past 24 hours.

## 2022-05-16 PROBLEM — E87.1 HYPONATREMIA: Status: RESOLVED | Noted: 2022-01-01 | Resolved: 2022-01-01

## 2022-05-16 NOTE — PROGRESS NOTES
[facility] - On[facility] ([facility])  Hematology  Bone Marrow Transplant  Progress Note    Patient Name: Susan Puente  Admission Date: 4/29/2022  Hospital Length of Stay: 14 days  Code Status: Full Code    Subjective:     Interval History: Feeling improved today, oxygen requirements unchanged -- continues on prolonged steroid taper. LFTs slowly downtrending. MARCIE, AF. Up in chair this AM, appetite improving, no joint pain today. Continues to work w/ PT/OT. Medically ready for d/c to SNF, awaiting bed availability.     Objective:     Vital Signs (Most Recent):  Temp: 97.7 °F (36.5 °C) (05/16/22 1226)  Pulse: 65 (05/16/22 1226)  Resp: 19 (05/16/22 1226)  BP: 134/66 (05/16/22 1226)  SpO2: 96 % (05/16/22 1226) Vital Signs (24h Range):  Temp:  [97.7 °F (36.5 °C)-98.1 °F (36.7 °C)] 97.7 °F (36.5 °C)  Pulse:  [65-90] 65  Resp:  [18-22] 19  SpO2:  [89 %-96 %] 96 %  BP: (134-175)/(66-77) 134/66     Weight: 69.8 kg (153 lb 14.1 oz)  Body mass index is 25.61 kg/m².  Body surface area is 1.79 meters squared.    ECOG SCORE             Intake/Output - Last 3 Shifts         05/14 0700  05/15 0659 05/15 0700  05/16 0659 05/16 0700  05/17 0659    P.O.  480 295    I.V. (mL/kg)       Blood 350      Total Intake(mL/kg) 350 (5) 480 (6.9) 295 (4.2)    Urine (mL/kg/hr) 1550 (0.9) 1600 (1) 1500 (3.7)    Stool 0 0     Total Output 1550 1600 1500    Net -1200 -1120 -1205           Stool Occurrence 0 x 1 x             Physical Exam  Vitals and nursing note reviewed.   Constitutional:       General: She is not in acute distress.     Appearance: She is well-developed. She is not diaphoretic.   HENT:      Head: Normocephalic and atraumatic.      Nose: Nose normal.      Mouth/Throat:      Mouth: Mucous membranes are moist.   Eyes:      General: No scleral icterus.     Conjunctiva/sclera: Conjunctivae normal.   Cardiovascular:      Rate and Rhythm: Normal rate and regular rhythm.      Heart sounds: Normal heart sounds. No murmur heard.    No  friction rub. No gallop.   Pulmonary:      Effort: Pulmonary effort is normal. No respiratory distress.      Breath sounds: Normal breath sounds. No wheezing or rales.   Abdominal:      General: Bowel sounds are normal. There is no distension.      Palpations: Abdomen is soft.      Tenderness: There is no abdominal tenderness. There is no guarding.   Musculoskeletal:         General: No tenderness. Normal range of motion.      Cervical back: Normal range of motion and neck supple.   Skin:     General: Skin is warm and dry.      Findings: No rash.   Neurological:      General: No focal deficit present.      Mental Status: She is alert and oriented to person, place, and time.   Psychiatric:         Behavior: Behavior normal.       Significant Labs:   CBC:   Recent Labs   Lab 05/15/22  0332 05/16/22  0331   WBC 6.19 7.20   HGB 8.0* 8.4*   HCT 25.7* 26.6*   * 149*    and CMP:   Recent Labs   Lab 05/15/22  0332 05/16/22 0331    136   K 4.0 3.7    97   CO2 30* 31*   * 113*   BUN 25* 32*   CREATININE 0.7 0.6   CALCIUM 8.4* 8.5*   PROT 5.5* 5.4*   ALBUMIN 1.9* 2.0*   BILITOT 0.3 0.3   ALKPHOS 93 87   AST 68* 50*   ALT 85* 79*   ANIONGAP 8 8   EGFRNONAA >60.0 >60.0       Diagnostic Results:  None    Assessment/Plan:     * Acute respiratory failure with hypoxia  Patient admitted with acute hypoxic respiratory failure, she was treated for PNA during recent admission and was initiated on home oxygen after 6 min walk test previous admission.  - CXR with pulmonary consolidation and fever on admission, recently completed levaquin/azithro course for CAP.   - Started on Cefepime on admission, further infectious work up unrevealing and afebrile >48 hours and Cefepime discontinued 5/3. Given increasing O2 requirements over the weekend with persistent RML consolidation, Cefepime resumed 5/8.   - Continue scheduled DuoNebs  - 5/6 CT Chest: Increased RML Consolidation  - 5/7 CT-A: Negative for PE; ABG with  hypoxemia.   - 5/9: Pulmonary consulted given increasing O2 requirements.   - S/p Bronch 5/11 AM. AFB/KOH staining was negative. Gram stain negative. Remaining cultures/studies NGTD. Bronch cytology with macrophages and inflammation.  - Continue Prednisone taper 60mg x 2wks, then taper down by 10mg every two weeks until complete.  - Given prolonged steroid taper, will continue GI ppx and dapsone for PJP ppx  - Will f/u with pulm outpatient     Acute gout of foot  - patient reports difficulty bearing weight on bilat feet due to pain, improving  - x-ray showing soft tissue edema suggestive of gout flare  - uric acid wnl  - allopurinol 100 mg daily for prevention  - cooling muscle cream PRN for additional pain support     MDS (myelodysplastic syndrome)  - Primary oncologist, Dr. 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 Developed allergy and was then desensitized. Soon after developed pancytopenia and Revlimid was stopped June 2017.    - BMBX on 6/8/17 was with normal cytogenetics. 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 Of 20 metaphases, 5 were normal and 15 had a 5q deletion, NGS positive for SF3B1 and TP53 (12%). 1.4% blasts  - Received vidaza for 22 cycles, received Decitabine for C23 due to national shortage of vidaza and then back to vidaza with C24.  - completed cycle 39 on 4/8/22  - due for cycle 40 on 05/02/2022, postponed whiled admitted and to be readdressed as outpatient     Essential  hypertension  - At home regimen: lisinopril/hctz and coreg  - SBP 170s-180s. Increased Coreg dose to 25mg with minimal improveemt  - Discussed with cardiology who recommended switching HCTZ to Chlorthalidone 25mg daily and adding Nifedipine XL 60mg with outpatient follow up. Nifedipine was held d/t hypotension and to allow for some permissible hypertension given clinical status  - Current anti-HTN regimen: Lisinopril 40mg daily, Coreg 25mg BID, Chlorthalidone 25mg daily. Nifedipine XL 30mg daily resumed 5/11 due to persistent HTN. Increased Nifedipine XL to 60mg on 5/16 d/t ongoing HTN, likely worse in setting of steroids.   - Will need cardiology f/u outpatient    Physical debility  - reports difficulty with ambulation due to pain and weakness  - PT/OT consulted. Recommending SNF. Pending SNF acceptance.     Pancytopenia due to antineoplastic chemotherapy  - Transfuse for hgb < 7 or plts < 10K  - Daily cbc while inpatient  - Continue acyclovir ppx  - Cefepime and fluconazole d/c'd 5/12 given normal WBC and afebrile     CAP (community acquired pneumonia)  - Pt with 2 weeks of productive cough, nasal congestion  - Upon arrival to the hospial she was hypoxic requiring 3L NC, weaned to 2L today  - CXR with likely develop consolidation  - CTA shows New patchy consolidative opacities in the in the right upper and posterior right lower lobes.  Findings suggestive infectious etiology such as pneumonia, multifocal pneumonia.  Also consider atypical given patient's reported immunocompromised status.  Aspiration could present similarly.  Consider continued follow-up after acute clinical illness has resolved to ensure resolution. New mediastinal and bilateral hilar lymphadenopathy, likely reactive.  - completed azithromycin and 7 day course of Levaquin on 4/30.  - CXR on admission showing possible developing consolidation: Cefepime 4/30-5/3    - See acute respiratory failure with hypoxia above    Controlled type 2 diabetes  mellitus without complication, without long-term current use of insulin  - Will hold home po diabetic medications  - SSI, ACHS blood glucose monitoring, and diabetic diet  - Goal bg 140-180    Hypokalemia  - K+ 4.0 today  - replacing per prn order set  - daily CMP while inpatient    Insomnia  - Continue nightly melatonin  - Added remeron 5/5    Adjustment disorder with mixed anxiety and depressed mood  - continue home celexa    CAD (coronary artery disease)  - S/P left carotid endarterectomy prior to cancer dx, s/p PCI (3 stents) >20 years ago   - Continue on ASA 81mg daily, platelets are wnl  - On repatha as outpatient (statin intolerant)        VTE Risk Mitigation (From admission, onward)         Ordered     IP VTE HIGH RISK PATIENT  Once         04/29/22 1651     Place sequential compression device  Until discontinued         04/29/22 1651                Disposition: Remain inpatient, awaiting SNF placement    Jolanta Guy PA-C  Bone Marrow Transplant  [facility] (Hospital)

## 2022-05-16 NOTE — ASSESSMENT & PLAN NOTE
- reports difficulty with ambulation due to pain and weakness  - PT/OT consulted. Recommending SNF. Pending SNF acceptance.

## 2022-05-16 NOTE — PT/OT/SLP PROGRESS
Occupational Therapy   Treatment    Name: Susan Puente  MRN: [id number]  Admitting Diagnosis:  Acute respiratory failure with hypoxia       Recommendations:     Discharge Recommendations: nursing facility, skilled  Discharge Equipment Recommendations:   (TBD)  Barriers to discharge:  Inaccessible home environment    Assessment:     Susan Puente is a 71 y.o. female with a medical diagnosis of Acute respiratory failure with hypoxia.  She presents with good motivation and participation on this date. Pt. Is making progress with transfers, endurance and self-care tasks.  Pt. Very open to therapy and recommendations. Pt. Continues to have weakness in BLE and has difficulty transferring from sit to stand from lower surfaces (I.e. bedside chair). Pt. Also noted to have increased pain in right shoulder on this date. Pt. would benefit from continued OT services to maximize safety and I with ADL tasks. Performance deficits affecting function are weakness, impaired endurance, impaired self care skills, impaired functional mobilty, decreased upper extremity function, decreased lower extremity function, pain.     Rehab Prognosis:  Good; patient would benefit from acute skilled OT services to address these deficits and reach maximum level of function.       Plan:     Patient to be seen 3 x/week to address the above listed problems via self-care/home management, therapeutic activities, therapeutic exercises, neuromuscular re-education  · Plan of Care Expires: 05/30/22  · Plan of Care Reviewed with: patient    Subjective   Pt. Was excited that she ambulated with PT on this date  Pain/Comfort:  · Pain Rating 1: 7/10  · Location - Side 1: Right  · Location 1: shoulder  · Pain Addressed 1: Reposition, Distraction, Other (see comments) (exercises/gentle stretches)  · Pain Rating Post-Intervention 1:  (more comfortable following ther ex/stretches but did not rate)    Objective:     Communicated with: nurse prior to session.   Patient found supine with PureWick, oxygen upon OT entry to room.    General Precautions: Standard, fall, respiratory   Orthopedic Precautions:N/A   Braces: N/A  Respiratory Status: Nasal cannula, flow 5 L/min     Occupational Performance:     Bed Mobility:    · Not tested as pt. Was up in chair      Functional Mobility/Transfers:  · Patient completed Sit <> Stand Transfer with maximal assistance  with  bed rail  from bedside chair on first trial; on second and third trials pt. Required Mod A as surface was built up on chair. Pt. Educated on proper technique to perform transfer.  Once in stand pt. Able to maintain holding bed rail with BUE for ~ 1 minute each trial with CGA.  · Functional Mobility: not tested    Activities of Daily Living:  · Not performed on this date      [facility] 6 Click ADL: 18    Treatment & Education:  Pt. Engaged in RUE /BUE there ex for scapula retraction and shoulder depression as well as shoulder ER x multiple trials focusing on gentle stretches with improvements in shoulder pain noted  Pt. Also educated on ER exercises to RUE using drink bottle in room to assist with AAROM  Pt. Educated on not hunching shoulder as likely contributes to feelings of impingement in RUE shoulder region.   Pt. Educated  On possible use of furniture risers in home environment if chair is stable but to have  services assess that. Pt. Also educated on building up seat with cushion to assist with ease of getting out of chair.     Patient left up in chair with all lines intact, call button in reach and nurse notifiedEducation:      GOALS:   Multidisciplinary Problems     Occupational Therapy Goals        Problem: Occupational Therapy    Goal Priority Disciplines Outcome Interventions   Occupational Therapy Goal     OT, PT/OT Ongoing, Progressing    Description: Goals to be met by: 5/14/22     Patient will increase functional independence with ADLs by performing:    LE Dressing with Set-up Assistance.  Grooming while  standing at sink with Stand-by Assistance.  Toileting from toilet with Minimal Assistance for hygiene and clothing management.   Toilet transfer to toilet with Stand-by Assistance.  Functional mobility of household and community distance with  minimum assistance and AD as needed                       Time Tracking:     OT Date of Treatment: 05/16/22  OT Start Time: 1135  OT Stop Time: 1202  OT Total Time (min): 27 min    Billable Minutes:Therapeutic Activity 15  Therapeutic Exercise 12    OT/ADRYAN: OT          5/16/2022

## 2022-05-16 NOTE — ASSESSMENT & PLAN NOTE
Patient admitted with acute hypoxic respiratory failure, she was treated for PNA during recent admission and was initiated on home oxygen after 6 min walk test previous admission.  - CXR with pulmonary consolidation and fever on admission, recently completed levaquin/azithro course for CAP.   - Started on Cefepime on admission, further infectious work up unrevealing and afebrile >48 hours and Cefepime discontinued 5/3. Given increasing O2 requirements over the weekend with persistent RML consolidation, Cefepime resumed 5/8.   - Continue scheduled DuoNebs  - 5/6 CT Chest: Increased RML Consolidation  - 5/7 CT-A: Negative for PE; ABG with hypoxemia.   - 5/9: Pulmonary consulted given increasing O2 requirements.   - S/p Bronch 5/11 AM. AFB/KOH staining was negative. Gram stain negative. Remaining cultures/studies NGTD. Bronch cytology with macrophages and inflammation.  - Continue Prednisone taper 60mg x 2wks, then taper down by 10mg every two weeks until complete.  - Given prolonged steroid taper, will continue GI ppx and dapsone for PJP ppx  - Will f/u with pulm outpatient

## 2022-05-16 NOTE — SUBJECTIVE & OBJECTIVE
Subjective:     Interval History: Feeling improved today, oxygen requirements unchanged -- continues on prolonged steroid taper. LFTs slowly downtrending. MENDOZAEON, AF. Up in chair this AM, appetite improving, no joint pain today. Continues to work w/ PT/OT. Medically ready for d/c to SNF, awaiting bed availability.     Objective:     Vital Signs (Most Recent):  Temp: 97.7 °F (36.5 °C) (05/16/22 1226)  Pulse: 65 (05/16/22 1226)  Resp: 19 (05/16/22 1226)  BP: 134/66 (05/16/22 1226)  SpO2: 96 % (05/16/22 1226) Vital Signs (24h Range):  Temp:  [97.7 °F (36.5 °C)-98.1 °F (36.7 °C)] 97.7 °F (36.5 °C)  Pulse:  [65-90] 65  Resp:  [18-22] 19  SpO2:  [89 %-96 %] 96 %  BP: (134-175)/(66-77) 134/66     Weight: 69.8 kg (153 lb 14.1 oz)  Body mass index is 25.61 kg/m².  Body surface area is 1.79 meters squared.    ECOG SCORE             Intake/Output - Last 3 Shifts         05/14 0700  05/15 0659 05/15 0700  05/16 0659 05/16 0700  05/17 0659    P.O.  480 295    I.V. (mL/kg)       Blood 350      Total Intake(mL/kg) 350 (5) 480 (6.9) 295 (4.2)    Urine (mL/kg/hr) 1550 (0.9) 1600 (1) 1500 (3.7)    Stool 0 0     Total Output 1550 1600 1500    Net -1200 -1120 -1205           Stool Occurrence 0 x 1 x             Physical Exam  Vitals and nursing note reviewed.   Constitutional:       General: She is not in acute distress.     Appearance: She is well-developed. She is not diaphoretic.   HENT:      Head: Normocephalic and atraumatic.      Nose: Nose normal.      Mouth/Throat:      Mouth: Mucous membranes are moist.   Eyes:      General: No scleral icterus.     Conjunctiva/sclera: Conjunctivae normal.   Cardiovascular:      Rate and Rhythm: Normal rate and regular rhythm.      Heart sounds: Normal heart sounds. No murmur heard.    No friction rub. No gallop.   Pulmonary:      Effort: Pulmonary effort is normal. No respiratory distress.      Breath sounds: Normal breath sounds. No wheezing or rales.   Abdominal:      General: Bowel sounds  are normal. There is no distension.      Palpations: Abdomen is soft.      Tenderness: There is no abdominal tenderness. There is no guarding.   Musculoskeletal:         General: No tenderness. Normal range of motion.      Cervical back: Normal range of motion and neck supple.   Skin:     General: Skin is warm and dry.      Findings: No rash.   Neurological:      General: No focal deficit present.      Mental Status: She is alert and oriented to person, place, and time.   Psychiatric:         Behavior: Behavior normal.       Significant Labs:   CBC:   Recent Labs   Lab 05/15/22  0332 05/16/22 0331   WBC 6.19 7.20   HGB 8.0* 8.4*   HCT 25.7* 26.6*   * 149*    and CMP:   Recent Labs   Lab 05/15/22  0332 05/16/22  0331    136   K 4.0 3.7    97   CO2 30* 31*   * 113*   BUN 25* 32*   CREATININE 0.7 0.6   CALCIUM 8.4* 8.5*   PROT 5.5* 5.4*   ALBUMIN 1.9* 2.0*   BILITOT 0.3 0.3   ALKPHOS 93 87   AST 68* 50*   ALT 85* 79*   ANIONGAP 8 8   EGFRNONAA >60.0 >60.0       Diagnostic Results:  None

## 2022-05-16 NOTE — PLAN OF CARE
POC reviewed with patient, no questions at this time. No acute events. VSS on 4L NC, afebrile. Electrolytes repleted as needed. Ambulates with x1 assist with walker to the chair. PT and OT following. Awaiting SNF placement. Calls appropriately for assistance.Will continue to monitor with frequent rounds and clustered care to promote rest.

## 2022-05-16 NOTE — ASSESSMENT & PLAN NOTE
- At home regimen: lisinopril/hctz and coreg  - SBP 170s-180s. Increased Coreg dose to 25mg with minimal improveemt  - Discussed with cardiology who recommended switching HCTZ to Chlorthalidone 25mg daily and adding Nifedipine XL 60mg with outpatient follow up. Nifedipine was held d/t hypotension and to allow for some permissible hypertension given clinical status  - Current anti-HTN regimen: Lisinopril 40mg daily, Coreg 25mg BID, Chlorthalidone 25mg daily. Nifedipine XL 30mg daily resumed 5/11 due to persistent HTN. Increased Nifedipine XL to 60mg on 5/16 d/t ongoing HTN, likely worse in setting of steroids.   - Will need cardiology f/u outpatient

## 2022-05-16 NOTE — PT/OT/SLP PROGRESS
Physical Therapy Treatment    Patient Name:  Susan Puente   MRN:  [id number]    Recommendations:     Discharge Recommendations:  nursing facility, skilled   Discharge Equipment Recommendations: other (see comments) (tbd)   Barriers to discharge: Decreased caregiver support    Assessment:     Susan Puente is a 71 y.o. female admitted with a medical diagnosis of Acute respiratory failure with hypoxia.  She presents with the following impairments/functional limitations:  gait instability, weakness, impaired endurance, impaired self care skills, impaired functional mobilty, decreased upper extremity function, decreased lower extremity function, impaired balance, pain. Susan Puente would benefit from acute PT intervention to address listed functional deficits, provide patient/caregiver education, reduce fall risk, and maximize (I) and safety with functional mobility.    Pt presents with significant functional mobility deficits and is functioning below baseline. Pt demonstrating significant improvement compared to previous session, as she was able to ambulated 15 ft and 15 ft with RW and CGA from PT. She also performed 4 sit to stand trials with CGA-Suki. Pt will continue to benefit from acute PT services in order to maximize safety and (I) with functional mobility.    After hospital discharge, pt would benefit from SNF to continue addressing therapy impairments.    Rehab Prognosis: Good; patient would benefit from acute skilled PT services to address these deficits and reach maximum level of function.      Plan:     During this hospitalization, patient to be seen 3 x/week to address the identified rehab impairments via gait training, therapeutic activities, therapeutic exercises, neuromuscular re-education and progress toward the following goals:    · Plan of Care Expires:  05/30/22    This plan of care has been discussed with the patient, who was included in its development and is in agreement with  "the identified goals and treatment plan.     Subjective     Communicated with RN prior to session.  Patient agreeable to participate.     Chief Complaint: none  Patient/Family Comments/goals: "I can't believe I walked today."  Pt pain prior to session: 0/10  Pt pain following session: 0/10    Objective:     Patient found up in chair with PureWick, oxygen  upon PT entry to room.    General Precautions: Standard, fall, respiratory   Orthopedic Precautions:N/A   Braces: N/A     Functional Mobility:    Bed Mobility:  · Not assessed 2/2 Pt up in chair    Transfers:   · Sit to Stand Transfer: Contact Guard Assistance and Minimal Assistance  from EOB and bedside chair, respectfully, with RW AD              Gait:  · Patient received gait training 15 feet and 15 ft with Contact Guard Assistance and rolling walker  · Gait Assessment: decreased speed, step length, swing through, heel strike, forward flexed posture, and downward gaze.   · PT providing verbal and tactile cues for improved upright posture, gaze direction, AD management, and gait fluidity.   · Verbal cues for correct hand/for placement and anterior weight shifting  · Seated rest break in bedside chair placed by door    Balance:  · Static Sit: Stand-By Assist at EOB x ~5 minutes  · Static Stand: Contact-Guard Assist with Rolling walker      Therapeutic Activities/Exercises     Patient educated on the importance of early mobility to prevent functional decline during hospital stay    Patient was instructed to utilize staff assistance for mobility/transfers.  Patient was educated on PT POC and all questions answered within PT scope of practice.    Patient able to verbalize understanding; will follow-up with pt during current admit for additional questions/concerns within scope of practice.     White board updated.     AM-PAC 6 CLICK MOBILITY  Total Score:17     Patient left up in chair with all lines intact, call button in reach and RN notified.        History/Goals: "     PAST MEDICAL HISTORY:  Past Medical History:   Diagnosis Date    Allergic rhinitis     Allergy     Anemia     Anxiety     CAD (coronary artery disease)     Carotid stenosis     Colon polyps 2016    Controlled type 2 diabetes mellitus without complication, without long-term current use of insulin 10/19/2016    Depression     GERD (gastroesophageal reflux disease)     Hearing loss in right ear     Hx of psychiatric care     Celexa, Prozac    Hypokalemia 2/3/2020    MDS (myelodysplastic syndrome) with 5q deletion     Psychiatric problem     Therapy        Past Surgical History:   Procedure Laterality Date    BONE MARROW BIOPSY Left 6/7/2018    Procedure: Biopsy-bone marrow;  Surgeon: Gita Valdes MD;  [facility];  Service: Oncology;  Laterality: Left;    BONE MARROW BIOPSY Left 3/21/2019    Procedure: Biopsy-bone marrow;  Surgeon: Gita Valdes MD;  [facility];  Service: Oncology;  Laterality: Left;    BONE MARROW BIOPSY Left 10/24/2019    Procedure: Biopsy-bone marrow;  Surgeon: Gita Valdes MD;  [facility];  Service: Oncology;  Laterality: Left;  left iliac crest    BONE MARROW BIOPSY Left 4/21/2021    Procedure: Biopsy-bone marrow;  Surgeon: Gita Valdes MD;  Location: [facility] ENDO (4TH FLR);  Service: Oncology;  Laterality: Left;    BUNIONECTOMY      CAROTID ENDARTERECTOMY Left 2011    CAROTID STENT      COLONOSCOPY N/A 12/20/2016    Procedure: COLONOSCOPY;  Surgeon: PATRICIA Simpson MD;  Location: [facility] ENDO (4TH FLR);  Service: Endoscopy;  Laterality: N/A;  pt has vomiting with anesthesia in past    ENDOMETRIAL ABLATION      for endometriosis    INSERTION OF TUNNELED CENTRAL VENOUS CATHETER (CVC) WITH SUBCUTANEOUS PORT N/A 2/19/2020    Procedure: [id number];  Surgeon: Dosc Diagnostic Provider;  Location: [facility] OR [address] [place] [address];  Service: Radiology;  Laterality: N/A;  189    TONSILLECTOMY      TYMPANOSTOMY TUBE PLACEMENT         GOALS:    Multidisciplinary Problems     Physical Therapy Goals        Problem: Physical Therapy    Goal Priority Disciplines Outcome Goal Variances Interventions   Physical Therapy Goal     PT, PT/OT Ongoing, Progressing     Description: Goals to be met by: 22, updated on 22    Patient will increase functional independence with mobility by performin. Supine to sit with modified independence  2. Sit to supine with modified independence  3. Sit to stand transfer with supervision  4. Bed to chair transfer with contact guard assistance using LRAD as needed  5. Gait  x 10 feet with minimum assistance using LRAD as needed  6. Ascend/descend 1 flight of stair with appropriate handrails minimum assistance using LRAD as needed  7. Lower extremity exercise program x10 reps per handout, with IND                     Time Tracking:     PT Received On: 22  PT Start Time: 1022     PT Stop Time: 1105  PT Total Time (min): 43 min     Billable Minutes: Gait Training 30 and Therapeutic Activity 13      Isacc Mccollum, PT  2022

## 2022-05-16 NOTE — MEDICAL/APP STUDENT
"[facility]  Progress Note    Patient Name: Susan Puente  MRN: [id number]  Patient Class: IP- Inpatient   Admission Date: 4/29/2022  Length of Stay: 14 days  Attending Physician: Rory Sotomayor MD  Primary Care Provider: Claudia Rapp MD    Subjective:     Principal Problem: Acute respiratory failure with hypoxia    Chief Complaint:   Chief Complaint   Patient presents with    Bilateral foot pain      Was d/c'ed 3 days ago for pneumonia; pt's family called EMS bc "they can't take care of her bc they are leaving for vacation". Pt usually uses walker to ambulate, states "I can't walk bc my feet hurt".        HPI: Ms. Susan Puente is a 71 y.o F with MDS on treatment with Vidaza (currently receiving decitabine due to national Vidaza shortage), NIDDM2, HTN, CAD, anxiety, depression, and gout. She was recently admitted 4/23/22 to 4/27/22 with CAP. She completed a course of Azithromycin and was discharged with home health, oxygen, and a 7 day course of Levaquin. She re-presented to the ED on 4/29 complaining of foot pain making it difficult to ambulate at home. Redness noted to bilateral great toes and X-ray showing soft tissue edema suggestive of gout to right foot and bunion to left foot.     Pulmonology consulted for increased oxygen requirements and evaluation of worsening RLL consolidative process on CT s/p treatment for CAP.      Hospital Course: No notes on file    Interval History: SIM over the weekend. Still requiring 5L NC O2 sat 94%    Past Medical History:   Diagnosis Date    Allergic rhinitis     Allergy     Anemia     Anxiety     CAD (coronary artery disease)     Carotid stenosis     Colon polyps 2016    Controlled type 2 diabetes mellitus without complication, without long-term current use of insulin 10/19/2016    Depression     GERD (gastroesophageal reflux disease)     Hearing loss in right ear     Hx of psychiatric care     Celexa, Prozac    Hypokalemia 2/3/2020 "    MDS (myelodysplastic syndrome) with 5q deletion     Psychiatric problem     Therapy        Past Surgical History:   Procedure Laterality Date    BONE MARROW BIOPSY Left 6/7/2018    Procedure: Biopsy-bone marrow;  Surgeon: Gita Valdes MD;  Location: [facility] OR [facility] F[facility];  Service: Oncology;  Laterality: Left;    BONE MARROW BIOPSY Left 3/21/2019    Procedure: Biopsy-bone marrow;  Surgeon: Gita Valdes MD;  Location: [facility] OR [facility] FLR;  Service: Oncology;  Laterality: Left;    BONE MARROW BIOPSY Left 10/24/2019    Procedure: Biopsy-bone marrow;  Surgeon: Gita Valdes MD;  Location: [facility] OR [facility] FLR;  Service: Oncology;  Laterality: Left;  left iliac crest    BONE MARROW BIOPSY Left 4/21/2021    Procedure: Biopsy-bone marrow;  Surgeon: Gita Valdes MD;  Location: [facility] ENDO (4TH FLR);  Service: Oncology;  Laterality: Left;    BUNIONECTOMY      CAROTID ENDARTERECTOMY Left 2011    CAROTID STENT      COLONOSCOPY N/A 12/20/2016    Procedure: COLONOSCOPY;  Surgeon: PATRICIA Simpson MD;  Location: [facility] ENDO (4TH FLR);  Service: Endoscopy;  Laterality: N/A;  pt has vomiting with anesthesia in past    ENDOMETRIAL ABLATION      for endometriosis    INSERTION OF TUNNELED CENTRAL VENOUS CATHETER (CVC) WITH SUBCUTANEOUS PORT N/A 2/19/2020    Procedure: [id number];  [facility];  [facility];  Service: Radiology;  Laterality: N/A;  189    TONSILLECTOMY      TYMPANOSTOMY TUBE PLACEMENT         Review of patient's allergies indicates:   Allergen Reactions    Revlimid [lenalidomide] Swelling     Facial swelling  6/8/17 - in the process of desensitation       No current facility-administered medications on file prior to encounter.     Current Outpatient Medications on File Prior to Encounter   Medication Sig    acetaminophen (TYLENOL) 650 MG TbSR Take 1,300 mg by mouth daily as needed (Headache).    acyclovir (ZOVIRAX) 400 MG tablet Take 1 tablet (400 mg total) by mouth 2 (two)  times daily.    aspirin (ECOTRIN) 81 MG EC tablet Take 1 tablet (81 mg total) by mouth once daily.    azacitidine (VIDAZA INJ) Inject as directed every 28 days.    carvediloL (COREG) 12.5 MG tablet Take 1 tablet (12.5 mg total) by mouth 2 (two) times daily with meals.    ciprofloxacin HCl (CIPRO) 500 MG tablet Take 1 tablet (500 mg total) by mouth 2 (two) times daily.    citalopram (CELEXA) 20 MG tablet Take 1 tablet (20 mg total) by mouth once daily.    evolocumab (REPATHA SURECLICK) 140 mg/mL PnIj Inject 140mg (1 pen) into the skin every 2 weeks. (Patient taking differently: Inject 140mg (1 pen) into the skin every 2 weeks.)    fluconazole (DIFLUCAN) 200 MG Tab Take 2 tablets (400 mg total) by mouth once daily.    ketotifen (ZADITOR) 0.025 % (0.035 %) ophthalmic solution Place 2-3 drops into both eyes daily as needed (Allergies).    lactose-reduced food (ENSURE ORAL) Take by mouth daily as needed (supplement).    lisinopriL-hydrochlorothiazide (PRINZIDE,ZESTORETIC) 20-12.5 mg per tablet Take 2 tablets by mouth once daily.    loperamide (IMODIUM) 2 mg capsule Take 4 mg by mouth daily as needed for Diarrhea.    loratadine (CLARITIN) 10 mg tablet Take 10 mg by mouth daily as needed.     magnesium oxide (MAGOX) 400 mg (241.3 mg magnesium) tablet Take 1 tablet (400 mg total) by mouth once daily.    melatonin (MELATIN) 5 mg Take 10 mg by mouth every evening.    MULTIVITAMIN ORAL Take 1 tablet by mouth once daily.    pantoprazole (PROTONIX) 40 MG tablet Take 1 tablet (40 mg total) by mouth once daily.    polyethylene glycol (MIRALAX) 17 gram PwPk Take 17 g by mouth daily as needed (Constipation).    potassium chloride SA (K-DUR,KLOR-CON) 20 MEQ tablet Take 1 tablet (20 mEq total) by mouth once daily.    deferasirox (EXJADE) 500 MG disintegrating tablet Take 3 tablets (1,500 mg total) by mouth before breakfast.    lancets 30 gauge Misc Use to test blood sugar daily (Patient taking differently: Use to  test blood sugar daily)     Family History     Problem Relation (Age of Onset)    Alcohol abuse Father, Brother    Cancer Paternal Grandmother    Heart disease Mother, Father    Hyperlipidemia Mother        Tobacco Use    Smoking status: Former Smoker     Packs/day: 1.50     Years: 50.00     Pack years: 75.00     Types: Cigarettes, Vaping with nicotine     Quit date: 3/3/2017     Years since quittin.2    Smokeless tobacco: Never Used   Substance and Sexual Activity    Alcohol use: No    Drug use: No    Sexual activity: Not on file     Review of Systems   Constitutional: Positive for activity change and fatigue.   HENT: Negative.    Eyes: Negative.    Respiratory: Positive for shortness of breath.    Cardiovascular: Negative.    Gastrointestinal: Negative.    Endocrine: Negative.    Genitourinary: Negative.    Musculoskeletal: Positive for arthralgias.   Skin: Negative.    Allergic/Immunologic: Positive for immunocompromised state.   Neurological: Negative.    Hematological: Negative.    Psychiatric/Behavioral: Negative.      Objective:     Vital Signs (Most Recent):  Temp: 97.7 °F (36.5 °C) (22 1226)  Pulse: 72 (22 1300)  Resp: 18 (22 1300)  BP: 134/66 (22 1226)  SpO2: (!) 93 % (22 1300) Vital Signs (24h Range):  Temp:  [97.7 °F (36.5 °C)-98.1 °F (36.7 °C)] 97.7 °F (36.5 °C)  Pulse:  [65-90] 72  Resp:  [18-22] 18  SpO2:  [89 %-96 %] 93 %  BP: (134-175)/(66-77) 134/66     Weight: 69.8 kg (153 lb 14.1 oz)  Body mass index is 25.61 kg/m².    Intake/Output Summary (Last 24 hours) at 2022 1508  Last data filed at 2022 0800  Gross per 24 hour   Intake 775 ml   Output 2000 ml   Net -1225 ml      Physical Exam  Constitutional:       Appearance: Normal appearance.   HENT:      Head: Normocephalic and atraumatic.      Nose: Nose normal.      Mouth/Throat:      Mouth: Mucous membranes are moist.      Pharynx: Oropharynx is clear.   Eyes:      Extraocular Movements: Extraocular  movements intact.      Conjunctiva/sclera: Conjunctivae normal.      Pupils: Pupils are equal, round, and reactive to light.   Cardiovascular:      Rate and Rhythm: Normal rate and regular rhythm.      Pulses: Normal pulses.      Heart sounds: Normal heart sounds.   Pulmonary:      Effort: Pulmonary effort is normal.      Breath sounds: Wheezing (LLL) and rales (Diffuse, bilaterally) present.   Abdominal:      General: Abdomen is flat. Bowel sounds are normal. There is no distension.      Palpations: Abdomen is soft.      Tenderness: There is no abdominal tenderness. There is no guarding.   Musculoskeletal:         General: Tenderness (R hand, bilateral knees, bilateral great toes) present.      Cervical back: Normal range of motion and neck supple.   Skin:     General: Skin is warm and dry.      Capillary Refill: Capillary refill takes 2 to 3 seconds.      Coloration: Skin is pale.      Comments: R  Chest wall port c/d/i   Neurological:      General: No focal deficit present.      Mental Status: She is alert and oriented to person, place, and time. Mental status is at baseline.         Significant Labs:   All pertinent labs within the past 24 hours have been reviewed.  CBC:   Recent Labs   Lab 05/15/22  0332 05/16/22 0331   WBC 6.19 7.20   HGB 8.0* 8.4*   HCT 25.7* 26.6*   * 149*     CMP:   Recent Labs   Lab 05/15/22  0332 05/16/22 0331    136   K 4.0 3.7    97   CO2 30* 31*   * 113*   BUN 25* 32*   CREATININE 0.7 0.6   CALCIUM 8.4* 8.5*   PROT 5.5* 5.4*   ALBUMIN 1.9* 2.0*   BILITOT 0.3 0.3   ALKPHOS 93 87   AST 68* 50*   ALT 85* 79*   ANIONGAP 8 8   EGFRNONAA >60.0 >60.0     Respiratory Culture:   No results for input(s): GSRESP, RESPIRATORYC in the last 48 hours.    Significant Imaging: I have reviewed all pertinent imaging results/findings within the past 24 hours.        Assessment/Plan:      Acute respiratory failure with hypoxia  Patient admitted with acute hypoxic respiratory  failure, she was treated for CAP with Azithromycin during 4/23-4/27 admission and a 7 day course of Levaquin, which she completed on 4/30.  - CXR with pulmonary consolidation and fever on admission  - Started on Cefepime further infectious work up unrevealing and afebrile >48 hours and Cefepime discontinued on 5/3. Given increasing O2 requirements over the weekend with persistent RML consolidation, Cefepime resumed 5/8.   - Continue scheduled DuoNebs  - CT chest significant for worsening of RLL consolidation.    - Concern for either untreated infection or alternative etiology including  or other inflammatory process.   - Pt tolerated bronchoscopy on 5/11 well. Cytology: 1 - Negative for malignant cells.  Benign bronchial cells. Inflammation and macrophages present.  AFB/KOH staining negative. Gram stain negative. AFB and bacterial/fungal cultures no growth to date.  - Pt on 5L NC, O2 sats 86-88%, increased to 5.5L NC  - On 5/13, started Prednisone 60mg qd for 2 weeks then taper down by 10mg q2 weeks until completion  - continue GI and PJP prophylaxis per primary team recs  - Awaiting SNF placement. f/u in clinic 2-4 weeks  - recommend Echo to r/o causes for persistent hypoxia (still on 5L NC, O2 sat 94)          Active Diagnoses:    Diagnosis Date Noted POA    PRINCIPAL PROBLEM:  Acute respiratory failure with hypoxia [J96.01] 04/26/2022 Yes    Acute gout of foot [M10.9] 04/29/2022 Yes    Physical debility [R53.81] 04/29/2022 Yes    Pancytopenia due to antineoplastic chemotherapy [D61.810, T45.1X5A] 04/26/2022 Yes    CAP (community acquired pneumonia) [J18.9] 04/24/2022 Yes    Controlled type 2 diabetes mellitus without complication, without long-term current use of insulin [E11.9] 07/17/2021 Yes    Insomnia [G47.00] 01/06/2020 Yes    MDS (myelodysplastic syndrome) [D46.9] 06/07/2018 Yes    Adjustment disorder with mixed anxiety and depressed mood [F43.23] 03/05/2018 Yes    Essential hypertension [I10]  12/01/2016 Yes    CAD (coronary artery disease) [I25.10]  Yes      Problems Resolved During this Admission:    Diagnosis Date Noted Date Resolved POA    Hyponatremia [E87.1] 05/05/2022 05/16/2022 No    Poor appetite [R63.0] 05/05/2022 05/07/2022 Yes    Fever [R50.9] 05/01/2022 05/07/2022 Yes     VTE Risk Mitigation (From admission, onward)         Ordered     IP VTE HIGH RISK PATIENT  Once         04/29/22 1651     Place sequential compression device  Until discontinued         04/29/22 1651                   Braden Orantes, MS-4  [facility]

## 2022-05-17 NOTE — MEDICAL/APP STUDENT
"[facility]  Progress Note    Patient Name: Susan Puente  MRN: [id number]  Patient Class: IP- Inpatient   Admission Date: 4/29/2022  Length of Stay: 15 days  Attending Physician: Rory Sotomayor MD  Primary Care Provider: Claudia Rapp MD    Subjective:     Principal Problem: Acute respiratory failure with hypoxia    Chief Complaint:   Chief Complaint   Patient presents with    Bilateral foot pain      Was d/c'ed 3 days ago for pneumonia; pt's family called EMS bc "they can't take care of her bc they are leaving for vacation". Pt usually uses walker to ambulate, states "I can't walk bc my feet hurt".        HPI: Ms. Susan Puente is a 71 y.o F with MDS on treatment with Vidaza (currently receiving decitabine due to national Vidaza shortage), NIDDM2, HTN, CAD, anxiety, depression, and gout. She was recently admitted 4/23/22 to 4/27/22 with CAP. She completed a course of Azithromycin and was discharged with home health, oxygen, and a 7 day course of Levaquin. She re-presented to the ED on 4/29 complaining of foot pain making it difficult to ambulate at home. Redness noted to bilateral great toes and X-ray showing soft tissue edema suggestive of gout to right foot and bunion to left foot.     Pulmonology consulted for increased oxygen requirements and evaluation of worsening RLL consolidative process on CT s/p treatment for CAP.      Hospital Course: No notes on file    Interval History: Pt now on 4L NC w/humidification O2 sat 94%    Past Medical History:   Diagnosis Date    Allergic rhinitis     Allergy     Anemia     Anxiety     CAD (coronary artery disease)     Carotid stenosis     Colon polyps 2016    Controlled type 2 diabetes mellitus without complication, without long-term current use of insulin 10/19/2016    Depression     GERD (gastroesophageal reflux disease)     Hearing loss in right ear     Hx of psychiatric care     Celexa, Prozac    Hypokalemia 2/3/2020    MDS " (myelodysplastic syndrome) with 5q deletion     Psychiatric problem     Therapy        Past Surgical History:   Procedure Laterality Date    BONE MARROW BIOPSY Left 6/7/2018    Procedure: Biopsy-bone marrow;  Surgeon: Gita Valdes MD;  Location: [facility] OR [facility] FLR;  Service: Oncology;  Laterality: Left;    BONE MARROW BIOPSY Left 3/21/2019    Procedure: Biopsy-bone marrow;  Surgeon: Gita Valdes MD;  Location: [facility] OR [facility] FLR;  Service: Oncology;  Laterality: Left;    BONE MARROW BIOPSY Left 10/24/2019    Procedure: Biopsy-bone marrow;  Surgeon: Gita Valdes MD;  Location: [facility] OR [facility] FLR;  Service: Oncology;  Laterality: Left;  left iliac crest    BONE MARROW BIOPSY Left 4/21/2021    Procedure: Biopsy-bone marrow;  Surgeon: Gita Valdes MD;  Location: [facility] ENDO (4TH FLR);  Service: Oncology;  Laterality: Left;    BUNIONECTOMY      CAROTID ENDARTERECTOMY Left 2011    CAROTID STENT      COLONOSCOPY N/A 12/20/2016    Procedure: COLONOSCOPY;  Surgeon: PATRICIA Simpson MD;  Location: [facility] ENDO (4TH FLR);  Service: Endoscopy;  Laterality: N/A;  pt has vomiting with anesthesia in past    ENDOMETRIAL ABLATION      for endometriosis    INSERTION OF TUNNELED CENTRAL VENOUS CATHETER (CVC) WITH SUBCUTANEOUS PORT N/A 2/19/2020    Procedure: [id number];  Surgeon: Dosc Diagnostic Provider;  [facility];  Service: Radiology;  Laterality: N/A;  189    TONSILLECTOMY      TYMPANOSTOMY TUBE PLACEMENT         Review of patient's allergies indicates:   Allergen Reactions    Revlimid [lenalidomide] Swelling     Facial swelling  6/8/17 - in the process of desensitation       No current facility-administered medications on file prior to encounter.     Current Outpatient Medications on File Prior to Encounter   Medication Sig    acetaminophen (TYLENOL) 650 MG TbSR Take 1,300 mg by mouth daily as needed (Headache).    acyclovir (ZOVIRAX) 400 MG tablet Take 1 tablet (400 mg total) by mouth 2 (two) times  daily.    aspirin (ECOTRIN) 81 MG EC tablet Take 1 tablet (81 mg total) by mouth once daily.    azacitidine (VIDAZA INJ) Inject as directed every 28 days.    carvediloL (COREG) 12.5 MG tablet Take 1 tablet (12.5 mg total) by mouth 2 (two) times daily with meals.    ciprofloxacin HCl (CIPRO) 500 MG tablet Take 1 tablet (500 mg total) by mouth 2 (two) times daily.    citalopram (CELEXA) 20 MG tablet Take 1 tablet (20 mg total) by mouth once daily.    evolocumab (REPATHA SURECLICK) 140 mg/mL PnIj Inject 140mg (1 pen) into the skin every 2 weeks. (Patient taking differently: Inject 140mg (1 pen) into the skin every 2 weeks.)    fluconazole (DIFLUCAN) 200 MG Tab Take 2 tablets (400 mg total) by mouth once daily.    ketotifen (ZADITOR) 0.025 % (0.035 %) ophthalmic solution Place 2-3 drops into both eyes daily as needed (Allergies).    lactose-reduced food (ENSURE ORAL) Take by mouth daily as needed (supplement).    lisinopriL-hydrochlorothiazide (PRINZIDE,ZESTORETIC) 20-12.5 mg per tablet Take 2 tablets by mouth once daily.    loperamide (IMODIUM) 2 mg capsule Take 4 mg by mouth daily as needed for Diarrhea.    loratadine (CLARITIN) 10 mg tablet Take 10 mg by mouth daily as needed.     magnesium oxide (MAGOX) 400 mg (241.3 mg magnesium) tablet Take 1 tablet (400 mg total) by mouth once daily.    melatonin (MELATIN) 5 mg Take 10 mg by mouth every evening.    MULTIVITAMIN ORAL Take 1 tablet by mouth once daily.    pantoprazole (PROTONIX) 40 MG tablet Take 1 tablet (40 mg total) by mouth once daily.    polyethylene glycol (MIRALAX) 17 gram PwPk Take 17 g by mouth daily as needed (Constipation).    potassium chloride SA (K-DUR,KLOR-CON) 20 MEQ tablet Take 1 tablet (20 mEq total) by mouth once daily.    deferasirox (EXJADE) 500 MG disintegrating tablet Take 3 tablets (1,500 mg total) by mouth before breakfast.    lancets 30 gauge Misc Use to test blood sugar daily (Patient taking differently: Use to test  blood sugar daily)     Family History     Problem Relation (Age of Onset)    Alcohol abuse Father, Brother    Cancer Paternal Grandmother    Heart disease Mother, Father    Hyperlipidemia Mother        Tobacco Use    Smoking status: Former Smoker     Packs/day: 1.50     Years: 50.00     Pack years: 75.00     Types: Cigarettes, Vaping with nicotine     Quit date: 3/3/2017     Years since quittin.2    Smokeless tobacco: Never Used   Substance and Sexual Activity    Alcohol use: No    Drug use: No    Sexual activity: Not on file     Review of Systems   Constitutional: Positive for activity change and fatigue.   HENT: Negative.    Eyes: Negative.    Respiratory: Positive for shortness of breath.    Cardiovascular: Negative.    Gastrointestinal: Negative.    Endocrine: Negative.    Genitourinary: Negative.    Musculoskeletal: Positive for arthralgias.   Skin: Negative.    Allergic/Immunologic: Positive for immunocompromised state.   Neurological: Negative.    Hematological: Negative.    Psychiatric/Behavioral: Negative.      Objective:     Vital Signs (Most Recent):  Temp: 98.1 °F (36.7 °C) (22 0740)  Pulse: 74 (22 0740)  Resp: 17 (22 0740)  BP: (!) 154/70 (22 0740)  SpO2: 95 % (22 0740) Vital Signs (24h Range):  Temp:  [97.5 °F (36.4 °C)-98.2 °F (36.8 °C)] 98.1 °F (36.7 °C)  Pulse:  [65-84] 74  Resp:  [16-20] 17  SpO2:  [93 %-97 %] 95 %  BP: (134-166)/(63-72) 154/70     Weight: 69.8 kg (153 lb 14.1 oz)  Body mass index is 25.61 kg/m².    Intake/Output Summary (Last 24 hours) at 2022 1052  Last data filed at 2022 0900  Gross per 24 hour   Intake 770 ml   Output 1800 ml   Net -1030 ml      Physical Exam  Constitutional:       Appearance: Normal appearance.   HENT:      Head: Normocephalic and atraumatic.      Nose: Nose normal.      Mouth/Throat:      Mouth: Mucous membranes are moist.      Pharynx: Oropharynx is clear.   Eyes:      Extraocular Movements: Extraocular  movements intact.      Conjunctiva/sclera: Conjunctivae normal.      Pupils: Pupils are equal, round, and reactive to light.   Cardiovascular:      Rate and Rhythm: Normal rate and regular rhythm.      Pulses: Normal pulses.      Heart sounds: Normal heart sounds.   Pulmonary:      Effort: Pulmonary effort is normal.      Breath sounds: Rales (bilateral bases) present.   Abdominal:      General: Abdomen is flat. Bowel sounds are normal. There is no distension.      Palpations: Abdomen is soft.      Tenderness: There is no abdominal tenderness. There is no guarding.   Musculoskeletal:         General: Tenderness (R hand, bilateral knees, bilateral great toes) present.      Cervical back: Normal range of motion and neck supple.   Skin:     General: Skin is warm and dry.      Capillary Refill: Capillary refill takes 2 to 3 seconds.      Coloration: Skin is pale.      Comments: R  Chest wall port c/d/i   Neurological:      General: No focal deficit present.      Mental Status: She is alert and oriented to person, place, and time. Mental status is at baseline.         Significant Labs:   All pertinent labs within the past 24 hours have been reviewed.  CBC:   Recent Labs   Lab 05/16/22  0331 05/17/22  0353   WBC 7.20 4.75   HGB 8.4* 7.6*   HCT 26.6* 24.1*   * 130*     CMP:   Recent Labs   Lab 05/16/22  0331 05/17/22  0353    138   K 3.7 3.5   CL 97 100   CO2 31* 31*   * 190*   BUN 32* 35*   CREATININE 0.6 0.7   CALCIUM 8.5* 8.6*   PROT 5.4* 5.4*   ALBUMIN 2.0* 2.1*   BILITOT 0.3 0.3   ALKPHOS 87 91   AST 50* 40   ALT 79* 77*   ANIONGAP 8 7*   EGFRNONAA >60.0 >60.0     Respiratory Culture:   No results for input(s): GSRESP, RESPIRATORYC in the last 48 hours.    Significant Imaging: I have reviewed all pertinent imaging results/findings within the past 24 hours.        Assessment/Plan:      Acute respiratory failure with hypoxia  Patient admitted with acute hypoxic respiratory failure, she was treated  for CAP with Azithromycin during 4/23-4/27 admission and a 7 day course of Levaquin, which she completed on 4/30.  - CXR with pulmonary consolidation and fever on admission  - Started on Cefepime further infectious work up unrevealing and afebrile >48 hours and Cefepime discontinued on 5/3. Given increasing O2 requirements over the weekend with persistent RML consolidation, Cefepime resumed 5/8.   - Continue scheduled DuoNebs  - CT chest significant for worsening of RLL consolidation.    - Concern for either untreated infection or alternative etiology including  or other inflammatory process.   - Pt tolerated bronchoscopy on 5/11 well. Cytology: 1 - Negative for malignant cells.  Benign bronchial cells. Inflammation and macrophages present.  AFB/KOH staining negative. Gram stain negative. AFB and bacterial/fungal cultures no growth to date.  - Pt on 5L NC, O2 sats 86-88%, increased to 5.5L NC  - On 5/13, started Prednisone 60mg qd for 2 weeks then taper down by 10mg q2 weeks until completion  - continue GI and PJP prophylaxis per primary team recs  - continue aggressive PT/OT  - Awaiting SNF placement. Recommend f/u in clinic 4-6 weeks  - recommend Echo to r/o causes for persistent hypoxia  - Echo ordered for today          Active Diagnoses:    Diagnosis Date Noted POA    PRINCIPAL PROBLEM:  Acute respiratory failure with hypoxia [J96.01] 04/26/2022 Yes    Acute gout of foot [M10.9] 04/29/2022 Yes    Physical debility [R53.81] 04/29/2022 Yes    Pancytopenia due to antineoplastic chemotherapy [D61.810, T45.1X5A] 04/26/2022 Yes    CAP (community acquired pneumonia) [J18.9] 04/24/2022 Yes    Controlled type 2 diabetes mellitus without complication, without long-term current use of insulin [E11.9] 07/17/2021 Yes    Insomnia [G47.00] 01/06/2020 Yes    MDS (myelodysplastic syndrome) [D46.9] 06/07/2018 Yes    Adjustment disorder with mixed anxiety and depressed mood [F43.23] 03/05/2018 Yes    Essential  hypertension [I10] 12/01/2016 Yes    CAD (coronary artery disease) [I25.10]  Yes      Problems Resolved During this Admission:    Diagnosis Date Noted Date Resolved POA    Hyponatremia [E87.1] 05/05/2022 05/16/2022 No    Poor appetite [R63.0] 05/05/2022 05/07/2022 Yes    Fever [R50.9] 05/01/2022 05/07/2022 Yes     VTE Risk Mitigation (From admission, onward)         Ordered     IP VTE HIGH RISK PATIENT  Once         04/29/22 1651     Place sequential compression device  Until discontinued         04/29/22 1651                   Braden Orantes, MS-4  [facility]

## 2022-05-17 NOTE — PROGRESS NOTES
[facility] ([facility])  Pulmonology  Progress Note    Patient Name: Susan Puente  MRN: [id number]  Admission Date: 4/29/2022  Hospital Length of Stay: 15 days  Code Status: Full Code  Attending Provider: Rory Sotomayor MD  Primary Care Provider: Claudia Rapp MD   Principal Problem: Acute respiratory failure with hypoxia    Subjective:     Interval History: Ms. Puente is feeling great today. Her oxygen was weaned and she continues to clear her nasal passages.     Objective:     Vital Signs (Most Recent):  Temp: 98.2 °F (36.8 °C) (05/17/22 1352)  Pulse: 80 (05/17/22 1352)  Resp: 19 (05/17/22 1352)  BP: 126/61 (05/17/22 1352)  SpO2: 97 % (05/17/22 1352) Vital Signs (24h Range):  Temp:  [97.5 °F (36.4 °C)-98.2 °F (36.8 °C)] 98.2 °F (36.8 °C)  Pulse:  [68-84] 80  Resp:  [16-20] 19  SpO2:  [92 %-97 %] 97 %  BP: (126-166)/(61-72) 126/61     Weight: 69.8 kg (153 lb 14.1 oz)  Body mass index is 25.61 kg/m².      Intake/Output Summary (Last 24 hours) at 5/17/2022 1422  Last data filed at 5/17/2022 0900  Gross per 24 hour   Intake 770 ml   Output 1800 ml   Net -1030 ml       Physical Exam  Constitutional:       Appearance: Normal appearance. She is normal weight.   HENT:      Head: Normocephalic and atraumatic.      Nose: Congestion present.      Mouth/Throat:      Mouth: Mucous membranes are moist.      Pharynx: Oropharynx is clear.   Eyes:      Extraocular Movements: Extraocular movements intact.      Conjunctiva/sclera: Conjunctivae normal.      Pupils: Pupils are equal, round, and reactive to light.   Cardiovascular:      Rate and Rhythm: Normal rate and regular rhythm.      Pulses: Normal pulses.      Heart sounds: No murmur heard.  Pulmonary:      Effort: Pulmonary effort is normal. No respiratory distress.      Breath sounds: Normal breath sounds.   Abdominal:      General: Abdomen is flat. Bowel sounds are normal.      Palpations: Abdomen is soft.   Musculoskeletal:         General: No  swelling.      Cervical back: Normal range of motion.   Skin:     General: Skin is warm and dry.      Capillary Refill: Capillary refill takes less than 2 seconds.      Coloration: Skin is not jaundiced.   Neurological:      General: No focal deficit present.      Mental Status: She is alert and oriented to person, place, and time.      Cranial Nerves: No cranial nerve deficit.   Psychiatric:         Mood and Affect: Mood normal.         Behavior: Behavior normal.       Vents:  Oxygen Concentration (%): 5 (05/16/22 0741)    Lines/Drains/Airways       Central Venous Catheter Line  Duration             Tunneled Central Line Insertion/Assessment - Double Lumen  02/19/20 1319 right internal jugular 818 days    Port A Cath Single Lumen 02/14/22 1615 91 days              Drain  Duration             Female External Urinary Catheter 04/30/22 1740 16 days                    Significant Labs:    CBC/Anemia Profile:  Recent Labs   Lab 05/16/22  0331 05/17/22  0353   WBC 7.20 4.75   HGB 8.4* 7.6*   HCT 26.6* 24.1*   * 130*   MCV 95 95   RDW 15.7* 15.4*        Chemistries:  Recent Labs   Lab 05/16/22  0331 05/17/22  0353    138   K 3.7 3.5   CL 97 100   CO2 31* 31*   BUN 32* 35*   CREATININE 0.6 0.7   CALCIUM 8.5* 8.6*   ALBUMIN 2.0* 2.1*   PROT 5.4* 5.4*   BILITOT 0.3 0.3   ALKPHOS 87 91   ALT 79* 77*   AST 50* 40   MG 1.3* 1.5*   PHOS 3.0 3.4       All pertinent labs within the past 24 hours have been reviewed.    Significant Imaging:  I have reviewed all pertinent imaging results/findings within the past 24 hours.    Assessment/Plan:     * Acute respiratory failure with hypoxia  Acute respiratory failure with hypoxia  Patient admitted with acute hypoxic respiratory failure, she was treated for CAP with Azithromycin during 4/23-4/27 admission and a 7 day course of Levaquin, which she completed on 4/30.  - CXR with pulmonary consolidation and fever on admission  - completed empiric sepsis antibiotics now only on  prophylactic medications   - Continue scheduled DuoNebs  - CT chest significant for worsening of RLL consolidation.    - Concern for either untreated infection or alternative etiology including  or other inflammatory process.   - Pt tolerated bronchoscopy on 5/11 well. Cytology: 1 - Negative for malignant cells.  Benign bronchial cells. Inflammation and macrophages present.  AFB/KOH staining negative. Gram stain negative. AFB and bacterial/fungal cultures no growth to date.  - Pt on 4L this am and doing well   - On 5/13, started Prednisone 60mg qd for 2 weeks then taper down by 10mg q2 weeks until completion  - continue GI and PJP prophylaxis per primary team recs  - continue aggressive PT/OT  - Awaiting SNF placement. Recommend f/u in clinic 6-8 weeks  - Echo ordered for today            Barbara Camara MD  Pulmonology  [facility] ([facility])

## 2022-05-17 NOTE — SUBJECTIVE & OBJECTIVE
Interval History: Ms. Puente is feeling great today. Her oxygen was weaned and she continues to clear her nasal passages.     Objective:     Vital Signs (Most Recent):  Temp: 98.2 °F (36.8 °C) (05/17/22 1352)  Pulse: 80 (05/17/22 1352)  Resp: 19 (05/17/22 1352)  BP: 126/61 (05/17/22 1352)  SpO2: 97 % (05/17/22 1352) Vital Signs (24h Range):  Temp:  [97.5 °F (36.4 °C)-98.2 °F (36.8 °C)] 98.2 °F (36.8 °C)  Pulse:  [68-84] 80  Resp:  [16-20] 19  SpO2:  [92 %-97 %] 97 %  BP: (126-166)/(61-72) 126/61     Weight: 69.8 kg (153 lb 14.1 oz)  Body mass index is 25.61 kg/m².      Intake/Output Summary (Last 24 hours) at 5/17/2022 1422  Last data filed at 5/17/2022 0900  Gross per 24 hour   Intake 770 ml   Output 1800 ml   Net -1030 ml       Physical Exam  Constitutional:       Appearance: Normal appearance. She is normal weight.   HENT:      Head: Normocephalic and atraumatic.      Nose: Congestion present.      Mouth/Throat:      Mouth: Mucous membranes are moist.      Pharynx: Oropharynx is clear.   Eyes:      Extraocular Movements: Extraocular movements intact.      Conjunctiva/sclera: Conjunctivae normal.      Pupils: Pupils are equal, round, and reactive to light.   Cardiovascular:      Rate and Rhythm: Normal rate and regular rhythm.      Pulses: Normal pulses.      Heart sounds: No murmur heard.  Pulmonary:      Effort: Pulmonary effort is normal. No respiratory distress.      Breath sounds: Normal breath sounds.   Abdominal:      General: Abdomen is flat. Bowel sounds are normal.      Palpations: Abdomen is soft.   Musculoskeletal:         General: No swelling.      Cervical back: Normal range of motion.   Skin:     General: Skin is warm and dry.      Capillary Refill: Capillary refill takes less than 2 seconds.      Coloration: Skin is not jaundiced.   Neurological:      General: No focal deficit present.      Mental Status: She is alert and oriented to person, place, and time.      Cranial Nerves: No cranial nerve  deficit.   Psychiatric:         Mood and Affect: Mood normal.         Behavior: Behavior normal.       Vents:  Oxygen Concentration (%): 5 (05/16/22 0741)    Lines/Drains/Airways       Central Venous Catheter Line  Duration             Tunneled Central Line Insertion/Assessment - Double Lumen  02/19/20 1319 right internal jugular 818 days    Port A Cath Single Lumen 02/14/22 1615 91 days              Drain  Duration             Female External Urinary Catheter 04/30/22 1740 16 days                    Significant Labs:    CBC/Anemia Profile:  Recent Labs   Lab 05/16/22  0331 05/17/22  0353   WBC 7.20 4.75   HGB 8.4* 7.6*   HCT 26.6* 24.1*   * 130*   MCV 95 95   RDW 15.7* 15.4*        Chemistries:  Recent Labs   Lab 05/16/22 0331 05/17/22  0353    138   K 3.7 3.5   CL 97 100   CO2 31* 31*   BUN 32* 35*   CREATININE 0.6 0.7   CALCIUM 8.5* 8.6*   ALBUMIN 2.0* 2.1*   PROT 5.4* 5.4*   BILITOT 0.3 0.3   ALKPHOS 87 91   ALT 79* 77*   AST 50* 40   MG 1.3* 1.5*   PHOS 3.0 3.4       All pertinent labs within the past 24 hours have been reviewed.    Significant Imaging:  I have reviewed all pertinent imaging results/findings within the past 24 hours.

## 2022-05-17 NOTE — PLAN OF CARE
POC reviewed with patient, no questions at this time. No acute events. VSS on 3L O2, afebrile. Electrolytes repleted as needed. Ambulates with x1 assist to the chair. One unit of PRBC's given, pt tolerated well. Preparing patient for SNF placement. Calls appropriately for assistance. Will continue to monitor with frequent rounds and clustered care to promote rest.

## 2022-05-17 NOTE — ASSESSMENT & PLAN NOTE
Patient admitted with acute hypoxic respiratory failure, she was treated for PNA during recent admission and was initiated on home oxygen after 6 min walk test previous admission.  - CXR with pulmonary consolidation and fever on admission, recently completed levaquin/azithro course for CAP.   - Started on Cefepime on admission, further infectious work up unrevealing and afebrile >48 hours and Cefepime discontinued 5/3. Given increasing O2 requirements over the weekend with persistent RML consolidation, Cefepime resumed 5/8.   - Continue scheduled DuoNebs  - 5/6 CT Chest: Increased RML Consolidation  - 5/7 CT-A: Negative for PE; ABG with hypoxemia.   - 5/9: Pulmonary consulted given increasing O2 requirements.   - S/p Bronch 5/11 AM. AFB/KOH staining was negative. Gram stain negative. Remaining cultures/studies NGTD. Bronch cytology with macrophages and inflammation.  - Continue Prednisone taper 60mg x 2wks, then taper down by 10mg every two weeks until complete.  - Given prolonged steroid taper, will continue GI ppx and dapsone for PJP ppx  - Hypoxia persists, but subjectively improving. Plan for echo today  - Will f/u with pulm outpatient

## 2022-05-17 NOTE — ASSESSMENT & PLAN NOTE
Acute respiratory failure with hypoxia  Patient admitted with acute hypoxic respiratory failure, she was treated for CAP with Azithromycin during 4/23-4/27 admission and a 7 day course of Levaquin, which she completed on 4/30.  - CXR with pulmonary consolidation and fever on admission  - completed empiric sepsis antibiotics now only on prophylactic medications   - Continue scheduled DuoNebs  - CT chest significant for worsening of RLL consolidation.    - Concern for either untreated infection or alternative etiology including  or other inflammatory process.   - Pt tolerated bronchoscopy on 5/11 well. Cytology: 1 - Negative for malignant cells.  Benign bronchial cells. Inflammation and macrophages present.  AFB/KOH staining negative. Gram stain negative. AFB and bacterial/fungal cultures no growth to date.  - Pt on 4L this am and doing well   - On 5/13, started Prednisone 60mg qd for 2 weeks then taper down by 10mg q2 weeks until completion  - continue GI and PJP prophylaxis per primary team recs  - continue aggressive PT/OT  - Awaiting SNF placement. Recommend f/u in clinic 6-8 weeks  - Echo ordered for today   No

## 2022-05-17 NOTE — PROGRESS NOTES
[facility] - Oncology ([facility])  Hematology  Bone Marrow Transplant  Progress Note    Patient Name: Susan Puente  Admission Date: 4/29/2022  Hospital Length of Stay: 15 days  Code Status: Full Code    Subjective:     Interval History: Continues to feel subjectively improved, although oxygen status remains unchanged on steroid taper. Afebrile. Pending Echo today. Continues working with PT/OT, up in chair this AM and eating breakfast. No complaints of joint pain today. Medically ready for d/c to SNF, awaiting bed availability.     Objective:     Vital Signs (Most Recent):  Temp: 98.1 °F (36.7 °C) (05/17/22 0740)  Pulse: 74 (05/17/22 0740)  Resp: 17 (05/17/22 0740)  BP: (!) 154/70 (05/17/22 0740)  SpO2: 95 % (05/17/22 0740)   Vital Signs (24h Range):  Temp:  [97.5 °F (36.4 °C)-98.2 °F (36.8 °C)] 98.1 °F (36.7 °C)  Pulse:  [65-84] 74  Resp:  [16-20] 17  SpO2:  [93 %-97 %] 95 %  BP: (134-166)/(63-72) 154/70     Weight: 69.8 kg (153 lb 14.1 oz)  Body mass index is 25.61 kg/m².  Body surface area is 1.79 meters squared.    ECOG SCORE             Intake/Output - Last 3 Shifts         05/15 0700 05/16 0659 05/16 0700  05/17 0659 05/17 0700  05/18 0659    P.O. 480 795 270    Blood       Total Intake(mL/kg) 480 (6.9) 795 (11.4) 270 (3.9)    Urine (mL/kg/hr) 1600 (1) 2300 (1.4) 1000 (3.3)    Stool 0  0    Total Output 1600 2300 1000    Net -1120 -1505 -730           Stool Occurrence 1 x  1 x            Physical Exam  Vitals and nursing note reviewed.   Constitutional:       General: She is not in acute distress.     Appearance: She is well-developed. She is not diaphoretic.   HENT:      Head: Normocephalic and atraumatic.      Nose: Nose normal.      Mouth/Throat:      Mouth: Mucous membranes are moist.   Eyes:      General: No scleral icterus.     Conjunctiva/sclera: Conjunctivae normal.   Cardiovascular:      Rate and Rhythm: Normal rate and regular rhythm.      Heart sounds: Normal heart sounds. No murmur heard.     No friction rub. No gallop.   Pulmonary:      Effort: Pulmonary effort is normal. No respiratory distress.      Breath sounds: No stridor. No wheezing.   Abdominal:      General: Bowel sounds are normal. There is no distension.      Palpations: Abdomen is soft.      Tenderness: There is no abdominal tenderness. There is no guarding.   Musculoskeletal:         General: No tenderness. Normal range of motion.      Cervical back: Normal range of motion and neck supple.      Comments: Redness of bilat great toes, non-tender   Skin:     General: Skin is warm and dry.      Findings: No rash.   Neurological:      General: No focal deficit present.      Mental Status: She is alert and oriented to person, place, and time.   Psychiatric:         Behavior: Behavior normal.       Significant Labs:   CBC:   Recent Labs   Lab 05/16/22  0331 05/17/22  0353   WBC 7.20 4.75   HGB 8.4* 7.6*   HCT 26.6* 24.1*   * 130*    and CMP:   Recent Labs   Lab 05/16/22  0331 05/17/22  0353    138   K 3.7 3.5   CL 97 100   CO2 31* 31*   * 190*   BUN 32* 35*   CREATININE 0.6 0.7   CALCIUM 8.5* 8.6*   PROT 5.4* 5.4*   ALBUMIN 2.0* 2.1*   BILITOT 0.3 0.3   ALKPHOS 87 91   AST 50* 40   ALT 79* 77*   ANIONGAP 8 7*   EGFRNONAA >60.0 >60.0       Diagnostic Results:  None    Assessment/Plan:     * Acute respiratory failure with hypoxia  Patient admitted with acute hypoxic respiratory failure, she was treated for PNA during recent admission and was initiated on home oxygen after 6 min walk test previous admission.  - CXR with pulmonary consolidation and fever on admission, recently completed levaquin/azithro course for CAP.   - Started on Cefepime on admission, further infectious work up unrevealing and afebrile >48 hours and Cefepime discontinued 5/3. Given increasing O2 requirements over the weekend with persistent RML consolidation, Cefepime resumed 5/8.   - Continue scheduled DuoNebs  - 5/6 CT Chest: Increased RML Consolidation  -  5/7 CT-A: Negative for PE; ABG with hypoxemia.   - 5/9: Pulmonary consulted given increasing O2 requirements.   - S/p Bronch 5/11 AM. AFB/KOH staining was negative. Gram stain negative. Remaining cultures/studies NGTD. Bronch cytology with macrophages and inflammation.  - Continue Prednisone taper 60mg x 2wks, then taper down by 10mg every two weeks until complete.  - Given prolonged steroid taper, will continue GI ppx and dapsone for PJP ppx  - Hypoxia persists, but subjectively improving. Plan for echo today  - Will f/u with pulm outpatient     Acute gout of foot  - patient reports difficulty bearing weight on bilat feet due to pain, improving  - x-ray showing soft tissue edema suggestive of gout flare  - uric acid wnl  - allopurinol 100 mg daily for prevention  - cooling muscle cream PRN for additional pain support     MDS (myelodysplastic syndrome)  - Primary oncologist, Dr. 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 Developed allergy and was then desensitized. Soon after developed pancytopenia and Revlimid was stopped June 2017.    - BMBX on 6/8/17 was with normal cytogenetics. 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 Of 20 metaphases, 5 were normal and 15 had a 5q deletion, NGS positive for SF3B1 and TP53 (12%). 1.4% blasts  - Received vidaza for 22 cycles, received Decitabine for C23 due to national shortage of vidaza and then back to vidaza with C24.  - completed cycle 39 on 4/8/22  - due for  cycle 40 on 05/02/2022, postponed whiled admitted and to be readdressed as outpatient     Essential hypertension  - At home regimen: lisinopril/hctz and coreg  - SBP 170s-180s. Increased Coreg dose to 25mg with minimal improveemt  - Discussed with cardiology who recommended switching HCTZ to Chlorthalidone 25mg daily and adding Nifedipine XL 60mg with outpatient follow up. Nifedipine was held d/t hypotension and to allow for some permissible hypertension given clinical status  - Current anti-HTN regimen: Lisinopril 40mg daily, Coreg 25mg BID, Chlorthalidone 25mg daily. Nifedipine XL 30mg daily resumed 5/11 due to persistent HTN. Increased Nifedipine XL to 60mg on 5/16 d/t ongoing HTN, likely worse in setting of steroids.   - Will need cardiology f/u outpatient    Physical debility  - reports difficulty with ambulation due to pain and weakness  - PT/OT consulted. Recommending SNF. Pending SNF acceptance.     Pancytopenia due to antineoplastic chemotherapy  - Transfuse for hgb < 7 or plts < 10K  - Daily cbc while inpatient  - Continue acyclovir ppx  - Cefepime and fluconazole d/c'd 5/12 given normal WBC and afebrile     CAP (community acquired pneumonia)  - Pt with 2 weeks of productive cough, nasal congestion  - Upon arrival to the hospial she was hypoxic requiring 3L NC, weaned to 2L today  - CXR with likely develop consolidation  - CTA shows New patchy consolidative opacities in the in the right upper and posterior right lower lobes.  Findings suggestive infectious etiology such as pneumonia, multifocal pneumonia.  Also consider atypical given patient's reported immunocompromised status.  Aspiration could present similarly.  Consider continued follow-up after acute clinical illness has resolved to ensure resolution. New mediastinal and bilateral hilar lymphadenopathy, likely reactive.  - completed azithromycin and 7 day course of Levaquin on 4/30.  - CXR on admission showing possible developing consolidation:  Cefepime 4/30-5/3    - See acute respiratory failure with hypoxia above    Controlled type 2 diabetes mellitus without complication, without long-term current use of insulin  - Will hold home po diabetic medications  - SSI, ACHS blood glucose monitoring, and diabetic diet  - Goal bg 140-180    Hypokalemia  - K+ 4.0 today  - replacing per prn order set  - daily CMP while inpatient    Insomnia  - Continue nightly melatonin  - Added remeron 5/5    Adjustment disorder with mixed anxiety and depressed mood  - continue home celexa    CAD (coronary artery disease)  - S/P left carotid endarterectomy prior to cancer dx, s/p PCI (3 stents) >20 years ago   - Continue on ASA 81mg daily, platelets are wnl  - On repatha as outpatient (statin intolerant)        VTE Risk Mitigation (From admission, onward)         Ordered     IP VTE HIGH RISK PATIENT  Once         04/29/22 1651     Place sequential compression device  Until discontinued         04/29/22 1651                Disposition: Remain inpatient     Jolanta Guy PA-C  Bone Marrow Transplant  [facility] - [facility] (Hospital)

## 2022-05-17 NOTE — PLAN OF CARE
No acute events or changes overnight. Patient without complaint throughout the night. Patient pending discharge to SNF.

## 2022-05-17 NOTE — SUBJECTIVE & OBJECTIVE
Subjective:     Interval History: Continues to feel subjectively improved, although oxygen status remains unchanged on steroid taper. Afebrile. Pending Echo today. Continues working with PT/OT, up in chair this AM and eating breakfast. No complaints of joint pain today. Medically ready for d/c to SNF, awaiting bed availability.     Objective:     Vital Signs (Most Recent):  Temp: 98.1 °F (36.7 °C) (05/17/22 0740)  Pulse: 74 (05/17/22 0740)  Resp: 17 (05/17/22 0740)  BP: (!) 154/70 (05/17/22 0740)  SpO2: 95 % (05/17/22 0740)   Vital Signs (24h Range):  Temp:  [97.5 °F (36.4 °C)-98.2 °F (36.8 °C)] 98.1 °F (36.7 °C)  Pulse:  [65-84] 74  Resp:  [16-20] 17  SpO2:  [93 %-97 %] 95 %  BP: (134-166)/(63-72) 154/70     Weight: 69.8 kg (153 lb 14.1 oz)  Body mass index is 25.61 kg/m².  Body surface area is 1.79 meters squared.    ECOG SCORE             Intake/Output - Last 3 Shifts         05/15 0700  05/16 0659 05/16 0700  05/17 0659 05/17 0700  05/18 0659    P.O. 480 795 270    Blood       Total Intake(mL/kg) 480 (6.9) 795 (11.4) 270 (3.9)    Urine (mL/kg/hr) 1600 (1) 2300 (1.4) 1000 (3.3)    Stool 0  0    Total Output 1600 2300 1000    Net -1120 -1505 -730           Stool Occurrence 1 x  1 x            Physical Exam  Vitals and nursing note reviewed.   Constitutional:       General: She is not in acute distress.     Appearance: She is well-developed. She is not diaphoretic.   HENT:      Head: Normocephalic and atraumatic.      Nose: Nose normal.      Mouth/Throat:      Mouth: Mucous membranes are moist.   Eyes:      General: No scleral icterus.     Conjunctiva/sclera: Conjunctivae normal.   Cardiovascular:      Rate and Rhythm: Normal rate and regular rhythm.      Heart sounds: Normal heart sounds. No murmur heard.    No friction rub. No gallop.   Pulmonary:      Effort: Pulmonary effort is normal. No respiratory distress.      Breath sounds: No stridor. No wheezing.   Abdominal:      General: Bowel sounds are normal.  There is no distension.      Palpations: Abdomen is soft.      Tenderness: There is no abdominal tenderness. There is no guarding.   Musculoskeletal:         General: No tenderness. Normal range of motion.      Cervical back: Normal range of motion and neck supple.      Comments: Redness of bilat great toes, non-tender   Skin:     General: Skin is warm and dry.      Findings: No rash.   Neurological:      General: No focal deficit present.      Mental Status: She is alert and oriented to person, place, and time.   Psychiatric:         Behavior: Behavior normal.       Significant Labs:   CBC:   Recent Labs   Lab 05/16/22  0331 05/17/22 0353   WBC 7.20 4.75   HGB 8.4* 7.6*   HCT 26.6* 24.1*   * 130*    and CMP:   Recent Labs   Lab 05/16/22 0331 05/17/22 0353    138   K 3.7 3.5   CL 97 100   CO2 31* 31*   * 190*   BUN 32* 35*   CREATININE 0.6 0.7   CALCIUM 8.5* 8.6*   PROT 5.4* 5.4*   ALBUMIN 2.0* 2.1*   BILITOT 0.3 0.3   ALKPHOS 87 91   AST 50* 40   ALT 79* 77*   ANIONGAP 8 7*   EGFRNONAA >60.0 >60.0       Diagnostic Results:  None

## 2022-05-18 NOTE — PT/OT/SLP PROGRESS
Occupational Therapy Treatment    Patient Name:  Susan Puente   MRN:  [id number]  Admit Date: 4/29/2022  Admitting Diagnosis:  Acute respiratory failure with hypoxia   Length of Stay: 16 days  Recent Surgery: * No surgery found *      Recommendations:     Discharge Recommendations: nursing facility, skilled  Discharge Equipment Recommendations:  other (see comments) (TBD (progress pending))  Barriers to discharge:  Inaccessible home environment    Plan:     Patient to be seen 3 x/week to address the above listed problems via self-care/home management, therapeutic activities, therapeutic exercises, neuromuscular re-education  · Plan of Care Expires: 05/30/22  · Plan of Care Reviewed with: patient    Assessment:   Susan Puente is a 71 y.o. female with a medical diagnosis of Acute respiratory failure with hypoxia.  She presents with the following performance deficits affecting function: weakness, impaired endurance, impaired self care skills, impaired functional mobilty, gait instability, impaired balance, pain, decreased safety awareness, decreased lower extremity function, impaired cardiopulmonary response to activity, edema, impaired coordination, decreased coordination.      Pt tolerated session fairly this date and was eager to participate. Demonstrating continued increased fatigue, impaired endurance, impaired balance, impaired BLE function, impaired cardiopulmonary response to activity and decreased safety awareness, requiring increased time and assistance to complete functional tasks. Patient found seated UIC, just finished lunch, eager to participate and reports ability to ambulate further during PT session this date. Patient continues to require increased assistance to complete sit>stand transfers, requiring Mod A to stand from chair and toilet levels. Patient complete functional mobility of household distance ~20ft with CGA using RW, 2L supplemental oxygen in use. Patient with no observed or  "reported SOB this date. Patient is progressing towards established goals, and continues to benefit from acute skilled OT services to increase functional performance and improve quality of life. OT to continue to recommend SNF at discharge to improve pt functional independence and increase patient safety before returning home.      Rehab Prognosis: Fair; patient would benefit from acute skilled OT services to address these deficits and reach maximum level of function.        Subjective   Communicated with: Nurse prior to session.  Patient found up in chair with oxygen, telemetry, peripheral IV, PureWick upon OT entry to room. Pt agreeable to participate at this time.    Patient: " Oh perfect timing, I need to be back in bed for my ultrasound lana "    Pain/Comfort:  · Pain Rating 1: 0/10  · Pain Rating Post-Intervention 1: 0/10    Objective:   Patient found with: oxygen, telemetry, peripheral IV, PureWick   General Precautions: Standard, Cardiac fall   Orthopedic Precautions:N/A   Braces: N/A   Oxygen Device: Nasal Cannula 2L  Vitals: BP (!) 149/73   Pulse 75   Temp 97.9 °F (36.6 °C)   Resp 16   Ht 5' 5" (1.651 m)   Wt 73.5 kg (162 lb)   SpO2 (!) 94%   BMI 26.96 kg/m²     Outcome Measures:  [facility] 6 Click ADL: 19    Cognition:   · Alert and Cooperative  · Command following: follows one-step commands  · Communication: clear/fluent    Occupational Performance:  Bed Mobility:    · Patient completed Sit to Supine with stand by assistance on R side of bed    Functional Mobility/Transfers:   Patient completed Sit <> Stand Transfer from chair with moderate assistance  with  rolling walker ; slight cueing for technique  o Patient completed stand from toilet level with Mod A using grab bar and RW; cueing for technique   Static Standing Balance: SBA   Patient completed Toilet Transfer Step Transfer technique with minimum assistance with  rolling walker and grab bars   Patient completed functional mobility of household " distance ~20ft with CGA using RW; required increased time and cueing for RW technique and safety.      Activities of Daily Living:  · Grooming: stand by assistance to wash B hands in stance at sink  · Lower Body Dressing: maximal assistance to manage adult brief/underwear for toileting task  · Toileting: supervision to perform pericare seated at toilet level    [facility] 6 Click ADL:  [facility] Total Score: 19    Treatment & Education:  -Pt education on OT role and POC.  -Importance of E/OOB activity with staff assistance, emphasis on daily participation  -Safety during functional transfer and mobility ensured  -Patient provided with education on importance of Bilateral UB/LB integration during functional tasks for improvement in functional performance.   -Education provided/reviewed, questions answered within OT scope of practice.   -Patient demonstrates understanding and learning this date.     Nurse updated that patient is safe for assist x 1 transfers with RW left in room for patient use.    Patient left supine with all lines intact, call button in reach, nurse notified and Ultrasound technician  present    GOALS:   Multidisciplinary Problems     Occupational Therapy Goals        Problem: Occupational Therapy    Goal Priority Disciplines Outcome Interventions   Occupational Therapy Goal     OT, PT/OT Ongoing, Progressing    Description: Goals to be met by: 5/14/22  Goals revised and extended to 05-30-22    Patient will increase functional independence with ADLs by performing:    LE Dressing with Set-up Assistance.NOT MET  Revised: LBD Mod A with AE as needed  Grooming while standing at sink with Stand-by Assistance.NOT MET  Revised: Grooming with Min A in stand  Toileting from toilet with Minimal Assistance for hygiene and clothing management.: ongoing  Toilet transfer to toilet with Stand-by Assistance.NOT MET  Revised: toilet transfer with Min A and use of RW  Functional mobility of household and community distance  with  minimum assistance and AD as needed ongoing  New Goal:Pt. To be I with HEP to BUE                        Time Tracking:     OT Date of Treatment: 05/18/22  OT Start Time: 1334  OT Stop Time: 1412  OT Total Time (min): 38 min    Billable Minutes:Self Care/Home Management 23  Therapeutic Exercise 15      5/18/2022

## 2022-05-18 NOTE — PROGRESS NOTES
[facility] Cordova - Oncology ([facility])  Hematology  Bone Marrow Transplant  Progress Note    Patient Name: Susan Puente  Admission Date: 4/29/2022  Hospital Length of Stay: 16 days  Code Status: Full Code    Subjective:     Interval History: Continues to feel improved, today her O2 saturations improved to 96% and O2 weaned to 2L, continues on steroid taper. Afebrile/VSS. Echo pending today. No complaints of joint pain today and still working with PT/OT, spending majority of day up in chair and ambulating around room. BUN rising, but cr stable/UOP stable, suspect rising BUN in setting of steroids/diuretics. Will continue to monitor. Awaiting SNF placement.     Objective:     Vital Signs (Most Recent):  Temp: 97.5 °F (36.4 °C) (05/18/22 0809)  Pulse: 78 (05/18/22 0809)  Resp: 16 (05/18/22 0809)  BP: (!) 162/71 (05/18/22 0809)  SpO2: (!) 94 % (05/18/22 0809)   Vital Signs (24h Range):  Temp:  [97.2 °F (36.2 °C)-98.2 °F (36.8 °C)] 97.5 °F (36.4 °C)  Pulse:  [68-82] 78  Resp:  [14-19] 16  SpO2:  [92 %-98 %] 94 %  BP: (120-162)/(55-71) 162/71     Weight: 73.6 kg (162 lb 4.1 oz)  Body mass index is 27 kg/m².  Body surface area is 1.84 meters squared.    ECOG SCORE             Intake/Output - Last 3 Shifts         05/16 0700  05/17 0659 05/17 0700  05/18 0659 05/18 0700  05/19 0659    P.O. 795 665     Blood  285     Total Intake(mL/kg) 795 (11.4) 950 (12.9)     Urine (mL/kg/hr) 2300 (1.4) 1500 (0.8)     Stool  0     Total Output 2300 1500     Net -1505 -550            Stool Occurrence  1 x             Physical Exam  Vitals and nursing note reviewed.   Constitutional:       General: She is not in acute distress.     Appearance: She is well-developed. She is not diaphoretic.   HENT:      Head: Normocephalic and atraumatic.      Nose: Nose normal.      Mouth/Throat:      Mouth: Mucous membranes are moist.   Eyes:      General: No scleral icterus.     Conjunctiva/sclera: Conjunctivae normal.   Cardiovascular:      Rate and  Rhythm: Normal rate and regular rhythm.      Heart sounds: Normal heart sounds. No murmur heard.    No friction rub. No gallop.   Pulmonary:      Effort: Pulmonary effort is normal. No respiratory distress.      Breath sounds: No stridor. No wheezing.   Abdominal:      General: Bowel sounds are normal. There is no distension.      Palpations: Abdomen is soft.      Tenderness: There is no abdominal tenderness. There is no guarding.   Musculoskeletal:         General: No tenderness. Normal range of motion.      Cervical back: Normal range of motion and neck supple.      Comments: Redness of bilat great toes, non-tender   Skin:     General: Skin is warm and dry.      Findings: No rash.   Neurological:      General: No focal deficit present.      Mental Status: She is alert and oriented to person, place, and time.   Psychiatric:         Behavior: Behavior normal.       Significant Labs:   CBC:   Recent Labs   Lab 05/17/22  0353 05/18/22  0414   WBC 4.75 5.70   HGB 7.6* 8.3*   HCT 24.1* 26.7*   * 138*    and CMP:   Recent Labs   Lab 05/17/22  0353 05/18/22  0414    141   K 3.5 4.3    102   CO2 31* 32*   * 152*   BUN 35* 43*   CREATININE 0.7 0.8   CALCIUM 8.6* 8.6*   PROT 5.4* 5.6*   ALBUMIN 2.1* 2.3*   BILITOT 0.3 0.3   ALKPHOS 91 82   AST 40 46*   ALT 77* 87*   ANIONGAP 7* 7*   EGFRNONAA >60.0 >60.0       Diagnostic Results:  None    Assessment/Plan:     * Acute respiratory failure with hypoxia  Patient admitted with acute hypoxic respiratory failure, she was treated for PNA during recent admission and was initiated on home oxygen after 6 min walk test previous admission.  - CXR with pulmonary consolidation and fever on admission, recently completed levaquin/azithro course for CAP.   - Completed Cefepime course.  - Continue scheduled DuoNebs  - 5/6 CT Chest: Increased RML Consolidation  - 5/7 CT-A: Negative for PE; ABG with hypoxemia.   - 5/9: Pulmonary consulted given increasing O2  requirements.   - S/p Bronch 5/11 AM. AFB/KOH staining was negative. Gram stain negative. Remaining cultures/studies NGTD. Bronch cytology with macrophages and inflammation.  - Continue Prednisone taper 60mg x 2wks, then taper down by 10mg every two weeks until complete.  - Given prolonged steroid taper, will continue GI ppx and dapsone for PJP ppx  - Hypoxia improving today, but subjectively improving. Plan for echo today  - Will f/u with pulm outpatient     Acute gout of foot  - patient reports difficulty bearing weight on bilat feet due to pain, improving  - x-ray showing soft tissue edema suggestive of gout flare  - uric acid wnl  - allopurinol 100 mg daily for prevention  - cooling muscle cream PRN for additional pain support     MDS (myelodysplastic syndrome)  - Primary oncologist, Dr. 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 Developed allergy and was then desensitized. Soon after developed pancytopenia and Revlimid was stopped June 2017.    - BMBX on 6/8/17 was with normal cytogenetics. 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 Of 20 metaphases, 5 were normal and 15 had a 5q deletion, NGS positive for SF3B1 and TP53 (12%). 1.4% blasts  - Received vidaza for 22 cycles, received Decitabine for C23 due to national shortage of vidaza and then back to vidaza with C24.  - completed cycle 39 on 4/8/22  - due for cycle 40 on 05/02/2022, postponed whiled admitted and to be readdressed as outpatient      Essential hypertension  - At home regimen: lisinopril/hctz and coreg  - SBP 170s-180s. Increased Coreg dose to 25mg with minimal improveemt  - Discussed with cardiology who recommended switching HCTZ to Chlorthalidone 25mg daily and adding Nifedipine XL 60mg with outpatient follow up. Nifedipine was held d/t hypotension and to allow for some permissible hypertension given clinical status  - Current anti-HTN regimen: Lisinopril 40mg daily, Coreg 25mg BID, Chlorthalidone 25mg daily. Nifedipine XL 30mg daily resumed 5/11 due to persistent HTN. Increased Nifedipine XL to 60mg on 5/16 d/t ongoing HTN, likely worse in setting of steroids.   - Will need cardiology f/u outpatient    Physical debility  - reports difficulty with ambulation due to pain and weakness  - PT/OT consulted. Recommending SNF. Pending SNF acceptance.     Pancytopenia due to antineoplastic chemotherapy  - Transfuse for hgb < 7 or plts < 10K  - Daily cbc while inpatient  - Continue acyclovir ppx  - Cefepime and fluconazole d/c'd 5/12 given normal WBC and afebrile     CAP (community acquired pneumonia)  - Pt with 2 weeks of productive cough, nasal congestion  - Upon arrival to the hospial she was hypoxic requiring 3L NC, weaned to 2L today  - CXR with likely develop consolidation  - CTA shows New patchy consolidative opacities in the in the right upper and posterior right lower lobes.  Findings suggestive infectious etiology such as pneumonia, multifocal pneumonia.  Also consider atypical given patient's reported immunocompromised status.  Aspiration could present similarly.  Consider continued follow-up after acute clinical illness has resolved to ensure resolution. New mediastinal and bilateral hilar lymphadenopathy, likely reactive.  - completed azithromycin and 7 day course of Levaquin on 4/30.  - CXR on admission showing possible developing consolidation: Cefepime 4/30-5/3    - See acute respiratory failure with hypoxia above    Controlled type  2 diabetes mellitus without complication, without long-term current use of insulin  - Will hold home po diabetic medications  - SSI, ACHS blood glucose monitoring, and diabetic diet  - Goal bg 140-180    Hypokalemia  - K+ 4.0 today  - replacing per prn order set  - daily CMP while inpatient    Insomnia  - Continue nightly melatonin  - Added remeron 5/5    Adjustment disorder with mixed anxiety and depressed mood  - continue home celexa    CAD (coronary artery disease)  - S/P left carotid endarterectomy prior to cancer dx, s/p PCI (3 stents) >20 years ago   - Continue on ASA 81mg daily, platelets are wnl  - On repatha as outpatient (statin intolerant)        VTE Risk Mitigation (From admission, onward)         Ordered     IP VTE HIGH RISK PATIENT  Once         04/29/22 1651     Place sequential compression device  Until discontinued         04/29/22 1651                Disposition: Awaiting SNF placement, medically ready for d/c    Jolanta Guy PA-C  Bone Marrow Transplant  [facility] - Oncology ([facility])

## 2022-05-18 NOTE — PT/OT/SLP PROGRESS
"Physical Therapy Treatment    Patient Name:  Susan Puente   MRN:  [id number]    Recommendations:     Discharge Recommendations:  nursing facility, skilled   Discharge Equipment Recommendations:  (TBD)   Barriers to discharge: Inaccessible home and Decreased caregiver support    Assessment:     Susan Puente is a 71 y.o. female admitted with a medical diagnosis of Acute respiratory failure with hypoxia.  She presents with the following impairments/functional limitations:  weakness, decreased safety awareness, impaired balance, impaired endurance, impaired functional mobilty, gait instability.  Pt participated in functional mobility training, able to ambulate further distances on this date with RW and CGA. Pt overall with improving activity tolerance and less SOB noted with all activity. Pt motivated to continue to progress. Pt continues to present below their independent prior functional baseline. Pt would benefit from continued, skilled PT while in house to address the above listed impairments, further progress mobility as able, return towards highest level of function.      Rehab Prognosis: Good; patient continues to benefit from acute skilled PT services to address these deficits and reach maximum level of function.  Patient remains most appropriate to discharge to nursing facility, skilled  Recent Surgery: * No surgery found *      Plan:     During this hospitalization, patient to be seen 3 x/week to address the identified rehab impairments via gait training, therapeutic activities, therapeutic exercises, neuromuscular re-education and progress toward the following goals:    · Plan of Care Expires:  05/30/22    Subjective     Subjective: "I had a wild night last night, I barely made it back from the chair to the bed!"   Pain/Comfort:   · Pain Rating 1: 0/10      Objective:     Communicated with RN prior to session.  Patient found supine with PureWick, oxygen upon PT entry to room.      General " Precautions: Standard, fall   Orthopedic Precautions:N/A   Braces: N/A     Functional Mobility:  · Bed Mobility: min assist for supine>sit with HOB elevated ~30 degrees, assistance primarily to upright trunk with use of bed rail as well.   · Transfers: mod assist x1 for sit<>stand with use of RW, slight increase in assistance required to clear buttocks from EOB with use of momentum.   · Gait: Patient ambulated ~25 ft with RW and CGA. Pt demo's reduced vinnie, limited foot clearance. Pt attempted to exaggerate heel strike in order to improve overall gait mechanics. Cueing for pursed lip breathing on 2L supplemental O2.  · Balance:   · Sitting: supervision while seated EOB without posterior back support.   · Standing: CGA with use of RW, no overt LOB noted throughout all standing activity.       AM-PAC 6 CLICK MOBILITY  Turning over in bed (including adjusting bedclothes, sheets and blankets)?: 3  Sitting down on and standing up from a chair with arms (e.g., wheelchair, bedside commode, etc.): 2  Moving from lying on back to sitting on the side of the bed?: 3  Moving to and from a bed to a chair (including a wheelchair)?: 3  Need to walk in hospital room?: 3  Climbing 3-5 steps with a railing?: 2  Basic Mobility Total Score: 16       Education:  Education provided re: d/c planning, PT POC, safety in mobility, benefits of mobility, goals for mobility, gait training, transfer training. Pt endorsed understanding.     Patient left up in chair with all lines intact and call button in reach.    GOALS:   Multidisciplinary Problems     Physical Therapy Goals        Problem: Physical Therapy    Goal Priority Disciplines Outcome Goal Variances Interventions   Physical Therapy Goal     PT, PT/OT Ongoing, Progressing     Description: Goals to be met by: 22, updated on 22    Patient will increase functional independence with mobility by performin. Supine to sit with modified independence  2. Sit to supine with  modified independence  3. Sit to stand transfer with supervision  4. Bed to chair transfer with contact guard assistance using LRAD as needed  5. Gait  x 10 feet with minimum assistance using LRAD as needed  6. Ascend/descend 1 flight of stair with appropriate handrails minimum assistance using LRAD as needed  7. Lower extremity exercise program x10 reps per handout, with IND                     Time Tracking:     PT Received On: 05/18/22  PT Start Time: 0944     PT Stop Time: 1008  PT Total Time (min): 24 min     Billable Minutes: Gait Training 10 min and Therapeutic Activity 14 min    Treatment Type: Treatment  PT/PTA: PT     PTA Visit Number: 0     Eden Meade, PT, DPT  5/18/2022

## 2022-05-18 NOTE — PLAN OF CARE
Plan of care reviewed with patient this shift. Pt had no c/o pain and slept comfortably overnight. VS stable. Maintaining saturations on 2L NC.  Electrolyte replacement scheduled. Pt urinated clear, yellow output via Purwick, working well. Air mattress applied. Turn q2h, sacral wound care. All questions and concerns addressed. Will continue to monitor.

## 2022-05-18 NOTE — NURSING
Plan of care discussed with patient.  Patient remains free of falls and trauma. Patient x1 assist with walker and gait belt to chair. Patient has no complaints of pain. Discussed medications and care. Patient has no questions at this time.

## 2022-05-18 NOTE — PHYSICIAN QUERY
PT Name: Susan Puente  MR #: [id number]     DOCUMENTATION CLARIFICATION     CDS: Rafiq De La Cruz RN CCDS               Contact information: [email or web address]   This form is a permanent document in the medical record.     Query Date: May 18, 2022    By submitting this query, we are merely seeking further clarification of documentation.  Please utilize your independent clinical judgment when addressing the question(s) below.  The Medical Record contains the following:  Indicators Supporting Clinical Findings Location in Medical Record   x HR         RR          BP        Temp T 98.1-98.5, HR 80-93, RR 18-24, -189/ DBP 63-81  T 98.2-99.7, HR 75-92, RR 18-20, -175/DBP 63-79  T 98.5-101.2, HR 76-89, RR 16-20, -185/DBP 69-78 4/29/2022 Vitals  4/30/2022 Vitals  5/1/2022 Vitals   x Lactic Acid          Procalcitonin  05/09/22 09:46   Procalcitonin 0.40 (H)     Lab value   x WBC           Bands          CRP     04/29/22 12:48 05/03/22 05:59 05/07/22 04:12 05/10/22 03:52   WBC 2.19 (L) 3.82 (L) 9.29 6.90      04/29/22 12:48   .4 (H)    Lab values    Culture(s)      AMS, Confusion, LOC, etc.     x Organ Dysfunction/Failure Acute respiratory failure with hypoxia 4/29/2022 H&P   x Bacteremia or Sepsis / Septic completed empiric sepsis antibiotics now only on prophylactic medications 5/17/2022 Pulmonology progress notes   x Known or Suspected Source of Infection documented CAP 4/29/2022 H&P   x (Failed) Outpatient Treatment She was admitted 4/23/22 to 4/27/22 with CAP. She was discharged on LVQ, which she will complete on 4/30.    Cefepime started yesterday given concern for ongoing RML consolidation on CT and O2 requirements. 4/29/2022 H&P      5/9/2022 BMT progress notes   x Medication levoFLOXacin 750 mg PO x 1  cefepime 2 g IVPB q8h  ciprofloxacin HCl 500 mg PO q12h  cefepime 2 g IVPB q8h 4/30/2022 Medication  5/1-5/3/2022 Medication  5/4-5/8/2022 Medication  5/8-5/12/2022 Medication     Treatment     x Other immunocompromised status  MDS    1 4/29/2022 H&P      4/29-5/1/2022 mSOFA score        Provider, please specify diagnosis or diagnoses associated with above clinical findings.    [   ] Sepsis due to unknown organism   [   ] Severe Sepsis with Acute Organ Dysfunction/Failure (please specify organ dysfunction/failure): _______   [ x  ] Sepsis Ruled Out   [   ] Other Infectious Disease (please specify): __________   [  ] Clinically Undetermined         Please document in your progress notes daily for the duration of treatment until resolved and include in your discharge summary.

## 2022-05-18 NOTE — SUBJECTIVE & OBJECTIVE
Subjective:     Interval History: Continues to feel improved, today her O2 saturations improved to 96% and O2 weaned to 2L, continues on steroid taper. Afebrile/VSS. Echo pending today. No complaints of joint pain today and still working with PT/OT, spending majority of day up in chair and ambulating around room. BUN rising, but cr stable/UOP stable, suspect rising BUN in setting of steroids/diuretics. Will continue to monitor. Awaiting SNF placement.     Objective:     Vital Signs (Most Recent):  Temp: 97.5 °F (36.4 °C) (05/18/22 0809)  Pulse: 78 (05/18/22 0809)  Resp: 16 (05/18/22 0809)  BP: (!) 162/71 (05/18/22 0809)  SpO2: (!) 94 % (05/18/22 0809)   Vital Signs (24h Range):  Temp:  [97.2 °F (36.2 °C)-98.2 °F (36.8 °C)] 97.5 °F (36.4 °C)  Pulse:  [68-82] 78  Resp:  [14-19] 16  SpO2:  [92 %-98 %] 94 %  BP: (120-162)/(55-71) 162/71     Weight: 73.6 kg (162 lb 4.1 oz)  Body mass index is 27 kg/m².  Body surface area is 1.84 meters squared.    ECOG SCORE             Intake/Output - Last 3 Shifts         05/16 0700  05/17 0659 05/17 0700  05/18 0659 05/18 0700  05/19 0659    P.O. 795 665     Blood  285     Total Intake(mL/kg) 795 (11.4) 950 (12.9)     Urine (mL/kg/hr) 2300 (1.4) 1500 (0.8)     Stool  0     Total Output 2300 1500     Net -1505 -550            Stool Occurrence  1 x             Physical Exam  Vitals and nursing note reviewed.   Constitutional:       General: She is not in acute distress.     Appearance: She is well-developed. She is not diaphoretic.   HENT:      Head: Normocephalic and atraumatic.      Nose: Nose normal.      Mouth/Throat:      Mouth: Mucous membranes are moist.   Eyes:      General: No scleral icterus.     Conjunctiva/sclera: Conjunctivae normal.   Cardiovascular:      Rate and Rhythm: Normal rate and regular rhythm.      Heart sounds: Normal heart sounds. No murmur heard.    No friction rub. No gallop.   Pulmonary:      Effort: Pulmonary effort is normal. No respiratory distress.       Breath sounds: No stridor. No wheezing.   Abdominal:      General: Bowel sounds are normal. There is no distension.      Palpations: Abdomen is soft.      Tenderness: There is no abdominal tenderness. There is no guarding.   Musculoskeletal:         General: No tenderness. Normal range of motion.      Cervical back: Normal range of motion and neck supple.      Comments: Redness of bilat great toes, non-tender   Skin:     General: Skin is warm and dry.      Findings: No rash.   Neurological:      General: No focal deficit present.      Mental Status: She is alert and oriented to person, place, and time.   Psychiatric:         Behavior: Behavior normal.       Significant Labs:   CBC:   Recent Labs   Lab 05/17/22  0353 05/18/22  0414   WBC 4.75 5.70   HGB 7.6* 8.3*   HCT 24.1* 26.7*   * 138*    and CMP:   Recent Labs   Lab 05/17/22  0353 05/18/22  0414    141   K 3.5 4.3    102   CO2 31* 32*   * 152*   BUN 35* 43*   CREATININE 0.7 0.8   CALCIUM 8.6* 8.6*   PROT 5.4* 5.6*   ALBUMIN 2.1* 2.3*   BILITOT 0.3 0.3   ALKPHOS 91 82   AST 40 46*   ALT 77* 87*   ANIONGAP 7* 7*   EGFRNONAA >60.0 >60.0       Diagnostic Results:  None

## 2022-05-18 NOTE — ASSESSMENT & PLAN NOTE
Patient admitted with acute hypoxic respiratory failure, she was treated for PNA during recent admission and was initiated on home oxygen after 6 min walk test previous admission.  - CXR with pulmonary consolidation and fever on admission, recently completed levaquin/azithro course for CAP.   - Completed Cefepime course.  - Continue scheduled DuoNebs  - 5/6 CT Chest: Increased RML Consolidation  - 5/7 CT-A: Negative for PE; ABG with hypoxemia.   - 5/9: Pulmonary consulted given increasing O2 requirements.   - S/p Bronch 5/11 AM. AFB/KOH staining was negative. Gram stain negative. Remaining cultures/studies NGTD. Bronch cytology with macrophages and inflammation.  - Continue Prednisone taper 60mg x 2wks, then taper down by 10mg every two weeks until complete.  - Given prolonged steroid taper, will continue GI ppx and dapsone for PJP ppx  - Hypoxia improving today, but subjectively improving. Plan for echo today  - Will f/u with pulm outpatient

## 2022-05-19 NOTE — PROGRESS NOTES
Pt was accepted to [facility]. Oncology LCSW arranged wheelchair van transportation to [facility] at 7:30PM.    Lora Last  [phone]

## 2022-05-19 NOTE — PLAN OF CARE
Nutrition Problem:  Moderate/Severe Protein-Calorie Malnutrition  Malnutrition in the context of Chronic Illness/Injury     Related to (etiology):  Decline in ADL's, taste changes     Signs and Symptoms (as evidenced by):  Energy Intake: <75% of estimated energy requirement for > one month  Body Fat Depletion: mild and moderate depletion of orbitals, triceps and thoracic and lumbar region   Muscle Mass Depletion: mild and moderate depletion of temples, clavicle region, interosseous muscle and lower extremities      Fluid Accumulation: moderate     Interventions(treatment strategy):  General diet   Nutrition education 5/19  Commercial beverage(calories and protein) boost glucose TID, [employer] TID  Collaboration with other providers     Nutrition Diagnosis Status:  Continues

## 2022-05-19 NOTE — PLAN OF CARE
[facility]    FACILITY TRANSFER ORDERS      Patient Name: Susan Puente  YOB: 1950    PCP: Claudia Rapp MD   PCP Address: [address] [place] [address] / [place] [address]  PCP Phone Number: [phone]  PCP Fax: [phone]    Encounter Date: 05/19/2022    Admit to: [facility]    Vital Signs:  Routine    Diagnoses:   Active Hospital Problems    Diagnosis  POA    *Acute respiratory failure with hypoxia [J96.01]  Yes     Priority: 1 - High    Acute gout of foot [M10.9]  Yes     Priority: 2     MDS (myelodysplastic syndrome) [D46.9]  Yes     Priority: 3     Essential hypertension [I10]  Yes     Priority: 4     Physical debility [R53.81]  Yes    Pancytopenia due to antineoplastic chemotherapy [D61.810, T45.1X5A]  Yes    CAP (community acquired pneumonia) [J18.9]  Yes    Controlled type 2 diabetes mellitus without complication, without long-term current use of insulin [E11.9]  Yes    Insomnia [G47.00]  Yes    Adjustment disorder with mixed anxiety and depressed mood [F43.23]  Yes    CAD (coronary artery disease) [I25.10]  Yes      Resolved Hospital Problems    Diagnosis Date Resolved POA    Hyponatremia [E87.1] 05/16/2022 No    Poor appetite [R63.0] 05/07/2022 Yes    Fever [R50.9] 05/07/2022 Yes       Allergies:  Review of patient's allergies indicates:   Allergen Reactions    Revlimid [lenalidomide] Swelling     Facial swelling  6/8/17 - in the process of desensitation       Diet: diabetic diet: 2000 calorie    Activities: Activity as tolerated    Nursing: Routine per unit policy       Labs: CBC, CMP and Mag, Phos 2x/weekly (Mon/Thurs)     CONSULTS:    Physical Therapy to evaluate and treat. , Occupational Therapy to evaluate and treat. and  to evaluate for community resources/long-range planning.    MISCELLANEOUS CARE:  Diabetes Care:   SN to perform and educate Diabetic management with blood glucose monitoring:, Fingerstick blood sugar AC and HS and  Report CBG < 60 or > 350 to physician.     Will require supplemental oxygen at facility    WOUND CARE ORDERS  None    Medications: Review discharge medications with patient and family and provide education.      Current Discharge Medication List      START taking these medications    Details   allopurinoL (ZYLOPRIM) 100 MG tablet Take 1 tablet (100 mg total) by mouth once daily.  Qty: 30 tablet, Refills: 3    Associated Diagnoses: Acute drug-induced gout of foot, unspecified laterality      chlorthalidone (HYGROTEN) 25 MG Tab Take 1 tablet (25 mg total) by mouth once daily.  Qty: 30 tablet, Refills: 11    Comments: .      dapsone 100 MG Tab Take 1 tablet (100 mg total) by mouth once daily.  Qty: 30 tablet, Refills: 3      lisinopriL (PRINIVIL,ZESTRIL) 40 MG tablet Take 1 tablet (40 mg total) by mouth once daily.  Qty: 90 tablet, Refills: 3    Comments: .      mirtazapine (REMERON) 7.5 MG Tab Take 1 tablet (7.5 mg total) by mouth every evening.  Qty: 30 tablet, Refills: 11      NIFEdipine (PROCARDIA-XL) 60 MG (OSM) 24 hr tablet Take 1 tablet (60 mg total) by mouth once daily.  Qty: 30 tablet, Refills: 11    Comments: .      ondansetron (ZOFRAN-ODT) 8 MG TbDL Take 1 tablet (8 mg total) by mouth every 8 (eight) hours as needed.  Qty: 30 tablet, Refills: 3      predniSONE (DELTASONE) 10 MG tablet Take 6 tablets (60 mg total) by mouth once daily for 8 days, THEN 5 tablets (50 mg total) once daily for 14 days, THEN 4 tablets (40 mg total) once daily for 14 days, THEN 3 tablets (30 mg total) once daily for 14 days, THEN 2 tablets (20 mg total) once daily for 14 days, THEN 1 tablet (10 mg total) once daily for 14 days, THEN 0.5 tablets (5 mg total) once daily for 7 days.  Qty: 262 tablet, Refills: 0         CONTINUE these medications which have CHANGED    Details   carvediloL (COREG) 25 MG tablet Take 1 tablet (25 mg total) by mouth 2 (two) times daily with meals.  Qty: 60 tablet, Refills: 11    Comments: .      magnesium  oxide (MAG-OX) 400 mg (241.3 mg magnesium) tablet Take 2 tablets (800 mg total) by mouth 2 (two) times daily.  Qty: 60 tablet, Refills: 0         CONTINUE these medications which have NOT CHANGED    Details   acetaminophen (TYLENOL) 650 MG TbSR Take 1,300 mg by mouth daily as needed (Headache).      acyclovir (ZOVIRAX) 400 MG tablet Take 1 tablet (400 mg total) by mouth 2 (two) times daily.  Qty: 60 tablet, Refills: 6    Associated Diagnoses: Pancytopenia      aspirin (ECOTRIN) 81 MG EC tablet Take 1 tablet (81 mg total) by mouth once daily.  Qty: 30 tablet, Refills: 11      azacitidine (VIDAZA INJ) Inject as directed every 28 days.      citalopram (CELEXA) 20 MG tablet Take 1 tablet (20 mg total) by mouth once daily.  Qty: 30 tablet, Refills: 2    Associated Diagnoses: Depression, unspecified depression type      evolocumab (REPATHA SURECLICK) 140 mg/mL PnIj Inject 140mg (1 pen) into the skin every 2 weeks.  Qty: 6 mL, Refills: 3      fluconazole (DIFLUCAN) 200 MG Tab Take 2 tablets (400 mg total) by mouth once daily.  Qty: 60 tablet, Refills: 6    Associated Diagnoses: Pancytopenia      ketotifen (ZADITOR) 0.025 % (0.035 %) ophthalmic solution Place 2-3 drops into both eyes daily as needed (Allergies).      lactose-reduced food (ENSURE ORAL) Take by mouth daily as needed (supplement).      loperamide (IMODIUM) 2 mg capsule Take 4 mg by mouth daily as needed for Diarrhea.      loratadine (CLARITIN) 10 mg tablet Take 10 mg by mouth daily as needed.       melatonin (MELATIN) 5 mg Take 10 mg by mouth every evening.      MULTIVITAMIN ORAL Take 1 tablet by mouth once daily.      pantoprazole (PROTONIX) 40 MG tablet Take 1 tablet (40 mg total) by mouth once daily.  Qty: 30 tablet, Refills: 3    Associated Diagnoses: Gastroesophageal reflux disease without esophagitis      polyethylene glycol (MIRALAX) 17 gram PwPk Take 17 g by mouth daily as needed (Constipation).      potassium chloride SA (K-DUR,KLOR-CON) 20 MEQ tablet  Take 1 tablet (20 mEq total) by mouth once daily.  Qty: 30 tablet, Refills: 4    Associated Diagnoses: Hypokalemia      deferasirox (EXJADE) 500 MG disintegrating tablet Take 3 tablets (1,500 mg total) by mouth before breakfast.  Qty: 90 tablet, Refills: 0    Associated Diagnoses: Iron overload      lancets 30 gauge Misc Use to test blood sugar daily  Qty: 100 each, Refills: 3    Associated Diagnoses: Controlled type 2 diabetes mellitus with stage 3 chronic kidney disease, without long-term current use of insulin         STOP taking these medications       ciprofloxacin HCl (CIPRO) 500 MG tablet Comments:   Reason for Stopping:         levoFLOXacin (LEVAQUIN) 750 MG tablet Comments:   Reason for Stopping:         lisinopriL-hydrochlorothiazide (PRINZIDE,ZESTORETIC) 20-12.5 mg per tablet Comments:   Reason for Stopping:                  Immunizations Administered as of 5/19/2022     No immunizations on file.          Not vaccinated. Afebrile. COVID PCR negative 5/19/22.    Some patients may experience side effects after vaccination.  These may include fever, headache, muscle or joint aches.  Most symptoms resolve with 24-48 hours and do not require urgent medical evaluation unless they persist for more than 72 hours or symptoms are concerning for an unrelated medical condition.            _________________________________  Jolanta Guy PA-C  05/19/2022

## 2022-05-19 NOTE — DISCHARGE SUMMARY
[facility] - Oncology ([facility])  Hematology  Bone Marrow Transplant  Discharge Summary      Patient Name: Susan Puente  MRN: [id number]  Admission Date: 4/29/2022  Hospital Length of Stay: 17 days  Discharge Date and Time:  05/19/2022 2:58 PM  Attending Physician: Rory Sotomayor MD   Discharging Provider: Jolanta Guy PA-C  Primary Care Provider: Claudia Rapp MD    HPI: Ms. Puente is a 71-y-o patient of Dr. Valdes with MDS on treatment with Vidaza (currently receiving decitabine due to national Vidaza shortage). Other medical history includes DM2, HTN, CAD, anxiety, and depression. She was admitted 4/23/22 to 4/27/22 with CAP. She was discharged on LVQ, which she will complete on 4/30. She was discharged 4/27/22 with home health and home oxygen. She presented to the ED today with complaint of bilat foot pain making it difficult to ambulate at home. Redness noted to bilat great toes. X-ray showing soft tissue edema suggestive of gout to right foot and bunion to left foot. She has a history of gout per patient. She will be admitted to BMT service under observation status. PT/OT will be consulted for eval of SNF appropriateness. She reports that her breathing is significantly improved.      * No surgery found *     Hospital Course: Ms. Puente was admitted inpatient on April 29th, shortly after being discharged from the hospital, with an acute gout flare and worsening respiratory status and fever, concerning for pneumonia and acute hypoxic respiratory failure. She was initiated on colchicine and allopurinol with improvement in gout flare. Unfortunately her prolonged hospital course was complicated by worsening respiratory status, requiring supplemental O2 and ongoing IV antibiotics. Bcx remained negative, but imaging revealed worsening RML consolidation. Ultimately, pulmonary was consulted and the patient underwent on 5/11 that was non-revealing of infectious source/malignancy, but did revealed  inflammation. The patient was started on a steroid taper with some moderate improvement in her respiratory status and ability to participate with therapy teams. Additional complications this hospitalization included poor PO intake/appetite, which have also improved with steroids and the addition of remeron; and resistant hypertension, which required changes to her anti-HTN regimen and an addition of nifidepine XL. Throughout stay, given her physical deconditioning, PT/OT continued to recommend SNF post-discharge. She was medically ready for discharge for several days prior to SNF bed availability and ultimately d/c'd to SNF on 5/19 in improved condition.     Acute respiratory failure with hypoxia  Patient admitted with acute hypoxic respiratory failure, she was treated for PNA during recent admission and was initiated on home oxygen after 6 min walk test previous admission.  - CXR with pulmonary consolidation and fever on admission, recently completed levaquin/azithro course for CAP.   - Completed Cefepime course.  - Continue scheduled DuoNebs  - 5/6 CT Chest: Increased RML Consolidation  - 5/7 CT-A: Negative for PE; ABG with hypoxemia.   - 5/9: Pulmonary consulted given increasing O2 requirements.   - S/p Bronch 5/11 AM. AFB/KOH staining was negative. Gram stain negative. Remaining cultures/studies NGTD. Bronch cytology with macrophages and inflammation.  - Continue Prednisone taper 60mg x 2wks, then taper down by 10mg every two weeks until complete.  - Given prolonged steroid taper, will continue GI ppx and dapsone for PJP ppx  - Hypoxia improving today, but subjectively improving. Plan for echo today  - Will f/u with pulm outpatient      Acute gout of foot  - patient reports difficulty bearing weight on bilat feet due to pain, improving  - x-ray showing soft tissue edema suggestive of gout flare  - uric acid wnl  - allopurinol 100 mg daily for prevention  - cooling muscle cream PRN for additional pain support       MDS (myelodysplastic syndrome)  - Primary oncologist, Dr. 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 Developed allergy and was then desensitized. Soon after developed pancytopenia and Revlimid was stopped June 2017.    - BMBX on 6/8/17 was with normal cytogenetics. 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 Of 20 metaphases, 5 were normal and 15 had a 5q deletion, NGS positive for SF3B1 and TP53 (12%). 1.4% blasts  - Received vidaza for 22 cycles, received Decitabine for C23 due to national shortage of vidaza and then back to vidaza with C24.  - completed cycle 39 on 4/8/22  - due for cycle 40 on 05/02/2022, postponed whiled admitted and to be readdressed as outpatient      Essential hypertension  - At home regimen: lisinopril/hctz and coreg  - SBP 170s-180s. Increased Coreg dose to 25mg with minimal improveemt  - Discussed with cardiology who recommended switching HCTZ to Chlorthalidone 25mg daily and adding Nifedipine XL 60mg with outpatient follow up. Nifedipine was held d/t hypotension and to allow for some permissible hypertension given clinical status  - Current anti-HTN regimen: Lisinopril 40mg daily, Coreg 25mg BID, Chlorthalidone 25mg daily. Nifedipine XL 30mg daily resumed 5/11 due to persistent HTN. Increased Nifedipine XL to 60mg on 5/16 d/t ongoing HTN, likely worse in setting of steroids.   - Will need cardiology f/u outpatient     Physical debility  - reports difficulty with ambulation  due to pain and weakness  - PT/OT consulted. Recommending SNF. Pending SNF acceptance.      Pancytopenia due to antineoplastic chemotherapy  - Transfuse for hgb < 7 or plts < 10K  - Daily cbc while inpatient  - Continue acyclovir ppx  - Cefepime and fluconazole d/c'd 5/12 given normal WBC and afebrile      CAP (community acquired pneumonia)  - Pt with 2 weeks of productive cough, nasal congestion  - Upon arrival to the hospial she was hypoxic requiring 3L NC, weaned to 2L today  - CXR with likely develop consolidation  - CTA shows New patchy consolidative opacities in the in the right upper and posterior right lower lobes.  Findings suggestive infectious etiology such as pneumonia, multifocal pneumonia.  Also consider atypical given patient's reported immunocompromised status.  Aspiration could present similarly.  Consider continued follow-up after acute clinical illness has resolved to ensure resolution. New mediastinal and bilateral hilar lymphadenopathy, likely reactive.  - completed azithromycin and 7 day course of Levaquin on 4/30.  - CXR on admission showing possible developing consolidation: Cefepime 4/30-5/3    - See acute respiratory failure with hypoxia above     Controlled type 2 diabetes mellitus without complication, without long-term current use of insulin  - Will hold home po diabetic medications  - SSI, ACHS blood glucose monitoring, and diabetic diet  - Goal bg 140-180     Hypokalemia  - K+ 4.0 today  - replacing per prn order set  - daily CMP while inpatient     Insomnia  - Continue nightly melatonin  - Added remeron 5/5     Adjustment disorder with mixed anxiety and depressed mood  - continue home celexa     CAD (coronary artery disease)  - S/P left carotid endarterectomy prior to cancer dx, s/p PCI (3 stents) >20 years ago   - Continue on ASA 81mg daily, platelets are wnl  - On repatha as outpatient (statin intolerant)      Goals of Care Treatment Preferences:  Code Status: Full Code    Living  Will: Yes              Consults (From admission, onward)        Status Ordering Provider     Inpatient consult to Hematology/Oncology Psychology  Once        Provider:  (Not yet assigned)    Completed MIKE GOMEZ     Inpatient consult to [facility]  Once        Provider:  (Not yet assigned)    Acknowledged MARY SANTOYO          Significant Diagnostic Studies: Labs:   CMP   Recent Labs   Lab 05/18/22  0414 05/19/22  0457    145   K 4.3 3.8    105   CO2 32* 32*   * 150*   BUN 43* 41*   CREATININE 0.8 0.7   CALCIUM 8.6* 9.0   PROT 5.6* 5.7*   ALBUMIN 2.3* 2.4*   BILITOT 0.3 0.2   ALKPHOS 82 76   AST 46* 47*   ALT 87* 93*   ANIONGAP 7* 8   ESTGFRAFRICA >60.0 >60.0   EGFRNONAA >60.0 >60.0    and CBC   Recent Labs   Lab 05/18/22  0414 05/19/22  0457   WBC 5.70 5.87   HGB 8.3* 8.4*   HCT 26.7* 27.2*   * 137*       Pending Diagnostic Studies:     None        Final Active Diagnoses:    Diagnosis Date Noted POA    PRINCIPAL PROBLEM:  Acute respiratory failure with hypoxia [J96.01] 04/26/2022 Yes    Acute gout of foot [M10.9] 04/29/2022 Yes    MDS (myelodysplastic syndrome) [D46.9] 06/07/2018 Yes    Essential hypertension [I10] 12/01/2016 Yes    Physical debility [R53.81] 04/29/2022 Yes    Pancytopenia due to antineoplastic chemotherapy [D61.810, T45.1X5A] 04/26/2022 Yes    CAP (community acquired pneumonia) [J18.9] 04/24/2022 Yes    Controlled type 2 diabetes mellitus without complication, without long-term current use of insulin [E11.9] 07/17/2021 Yes    Insomnia [G47.00] 01/06/2020 Yes    Adjustment disorder with mixed anxiety and depressed mood [F43.23] 03/05/2018 Yes    CAD (coronary artery disease) [I25.10]  Yes      Problems Resolved During this Admission:    Diagnosis Date Noted Date Resolved POA    Hyponatremia [E87.1] 05/05/2022 05/16/2022 No    Poor appetite [R63.0] 05/05/2022 05/07/2022 Yes    Fever [R50.9] 05/01/2022 05/07/2022 Yes      Discharged Condition:  fair    Disposition: Discharge to SNF    Follow Up:  Future Appointments   Date Time Provider Department Center   5/30/2022 10:30 AM [facility]   5/31/2022 11:00 AM NURSE 11, [facility]   6/2/2022 12:30 PM [facility]   6/2/2022  1:30 PM Gita Valdes MD [facility] BMT [facility]   6/3/2022 11:00 AM NURSE 11, [facility]         Patient Instructions:      Ambulatory referral/consult to Cardiology   Standing Status: Future   Referral Priority: Routine Referral Type: Consultation   Referral Reason: Specialty Services Required   Requested Specialty: Cardiology     Ambulatory referral/consult to Pulmonology   Standing Status: Future   Referral Priority: Routine Referral Type: Consultation   Referral Reason: Specialty Services Required   Referred to Provider: LYNN ROSS Requested Specialty: Pulmonary Disease     Medications:  Reconciled Home Medications:      Medication List      START taking these medications    allopurinoL 100 MG tablet  Commonly known as: ZYLOPRIM  Take 1 tablet (100 mg total) by mouth once daily.     chlorthalidone 25 MG Tab  Commonly known as: HYGROTEN  Take 1 tablet (25 mg total) by mouth once daily.     dapsone 100 MG Tab  Take 1 tablet (100 mg total) by mouth once daily.     lisinopriL 40 MG tablet  Commonly known as: PRINIVIL,ZESTRIL  Take 1 tablet (40 mg total) by mouth once daily.     mirtazapine 7.5 MG Tab  Commonly known as: REMERON  Take 1 tablet (7.5 mg total) by mouth every evening.     NIFEdipine 60 MG (OSM) 24 hr tablet  Commonly known as: PROCARDIA-XL  Take 1 tablet (60 mg total) by mouth once daily.     ondansetron 8 MG Tbdl  Commonly known as: ZOFRAN-ODT  Dissolve 1 tablet (8 mg total) by mouth every 8 (eight) hours as needed.     predniSONE 10 MG tablet  Commonly known as: DELTASONE  Take 6 tabs by mouth once daily for 8 days, THEN 5 tabs once daily for 14 days, 4 tabs daily for  14 days, 3 tabs daily for 14 days, 2 tabs daily for 14 days, 1 tab once daily for 14 days, 0.5 tab once daily for 7 days.  Start taking on: May 19, 2022        CHANGE how you take these medications    carvediloL 25 MG tablet  Commonly known as: COREG  Take 1 tablet (25 mg total) by mouth 2 (two) times daily with meals.  What changed:   · medication strength  · how much to take     magnesium oxide 400 mg (241.3 mg magnesium) tablet  Commonly known as: MAG-OX  Take 2 tablets (800 mg total) by mouth 2 (two) times daily.  What changed:   · how much to take  · when to take this        CONTINUE taking these medications    acetaminophen 650 MG Tbsr  Commonly known as: TYLENOL  Take 1,300 mg by mouth daily as needed (Headache).     acyclovir 400 MG tablet  Commonly known as: ZOVIRAX  Take 1 tablet (400 mg total) by mouth 2 (two) times daily.     aspirin 81 MG EC tablet  Commonly known as: ECOTRIN  Take 1 tablet (81 mg total) by mouth once daily.     citalopram 20 MG tablet  Commonly known as: CeleXA  Take 1 tablet (20 mg total) by mouth once daily.     deferasirox 500 MG disintegrating tablet  Commonly known as: EXJADE  Take 3 tablets (1,500 mg total) by mouth before breakfast.     ENSURE ORAL  Take by mouth daily as needed (supplement).     fluconazole 200 MG Tab  Commonly known as: DIFLUCAN  Take 2 tablets (400 mg total) by mouth once daily.     ketotifen 0.025 % (0.035 %) ophthalmic solution  Commonly known as: ZADITOR  Place 2-3 drops into both eyes daily as needed (Allergies).     loperamide 2 mg capsule  Commonly known as: IMODIUM  Take 4 mg by mouth daily as needed for Diarrhea.     loratadine 10 mg tablet  Commonly known as: CLARITIN  Take 10 mg by mouth daily as needed.     melatonin 5 mg  Commonly known as: MELATIN  Take 10 mg by mouth every evening.     MIRALAX 17 gram Pwpk  Generic drug: polyethylene glycol  Take 17 g by mouth daily as needed (Constipation).     MULTIVITAMIN ORAL  Take 1 tablet by mouth once  daily.     pantoprazole 40 MG tablet  Commonly known as: PROTONIX  Take 1 tablet (40 mg total) by mouth once daily.     potassium chloride SA 20 MEQ tablet  Commonly known as: K-DUR,KLOR-CON  Take 1 tablet (20 mEq total) by mouth once daily.     REPATHA SURECLICK 140 mg/mL Pnij  Generic drug: evolocumab  Inject 140mg (1 pen) into the skin every 2 weeks.     TRUEPLUS LANCETS 30 gauge Misc  Generic drug: lancets  Use to test blood sugar daily     VIDAZA INJ  Inject as directed every 28 days.        STOP taking these medications    ciprofloxacin HCl 500 MG tablet  Commonly known as: CIPRO     levoFLOXacin 750 MG tablet  Commonly known as: LEVAQUIN     lisinopriL-hydrochlorothiazide 20-12.5 mg per tablet  Commonly known as: EDWARD BUCK PA-C  Bone Marrow Transplant  [facility] - Oncology ([facility])

## 2022-05-19 NOTE — PROGRESS NOTES
Burak Cordova - Oncology ([facility])  Adult Nutrition  Progress Note    SUMMARY   Recommendations  Continue regular diet, boost glucose TID,follow glucose,  Goals: PO to meet 85% of EEN/EPN with ONS by next RD follow up  Nutrition Goal Status: new  Communication of RD Recs: other (comment) (POC)    Assessment and Plan  Nutrition Problem:  Moderate/Severe Protein-Calorie Malnutrition  Malnutrition in the context of Chronic Illness/Injury    Related to (etiology):  Decline in ADL's, taste changes    Signs and Symptoms (as evidenced by):  Energy Intake: <75% of estimated energy requirement for > one month  Body Fat Depletion: mild and moderate depletion of orbitals, triceps and thoracic and lumbar region   Muscle Mass Depletion: mild and moderate depletion of temples, clavicle region, interosseous muscle and lower extremities     Fluid Accumulation: moderate    Interventions(treatment strategy):  General diet   Nutrition education 5/19  Commercial beverage(calories and protein) boost glucose TID, [employer] TID  Collaboration with other providers    Nutrition Diagnosis Status:  Continues           Malnutrition Assessment  5/19/22     Skin (Micronutrient): dry, pallor  Hair/Scalp (Micronutrient): dry  Teeth (Micronutrient): broken dentition  Neck/Chest (Micronutrient): muscle wasting  Musculoskeletal/Lower Extremities: muscle wasting       Energy Intake (Malnutrition): less than 75% for greater than or equal to 1 month   Orbital Region (Subcutaneous Fat Loss): moderate depletion  Upper Arm Region (Subcutaneous Fat Loss): mild depletion  Thoracic and Lumbar Region: well nourished   Sabianism Region (Muscle Loss): moderate depletion  Clavicle Bone Region (Muscle Loss): moderate depletion  Clavicle and Acromion Bone Region (Muscle Loss): moderate depletion  Dorsal Hand (Muscle Loss): moderate depletion  Patellar Region (Muscle Loss): moderate depletion  Anterior Thigh Region (Muscle Loss): moderate depletion  Posterior Calf Region  "(Muscle Loss): moderate depletion                 Reason for Assessment    Reason For Assessment: length of stay  Diagnosis: cancer diagnosis/related complications, pulmonary disease (respiratory failure)  Relevant Medical History: DM, CAD, MDS, Gout, debility, chemo,  Interdisciplinary Rounds: did not attend  General Information Comments: Patient receptive to education on how to improve PO intake at home, she thought she was lactose intolerant due to symptoms when she was using single serving bottled Fairlife chocolate milk, however that may be tx or Rx induced as Fairlife milk is very low lactose due to filtering process. patient was not checking her blood glucose at home and PO was poor. she has had stable wt this past year in the 160's. In the previous year she had a 10-15 # wt loss in 2021. NFPE completed showing moderte loss fat and muscle. She has experienced taste problems since 12/2021. She tries to follow a diabetic diet but had not been prior to admit. She wants a new glucometer even if she has to pay for the materials herself. She has had moderate malnutriton since 12/21. Her edema is resolving.  Nutrition Discharge Planning: DC on diabetic diet ONS of choice    Nutrition/Diet History    Patient Reported Diet/Restrictions/Preferences: diabetic diet  Typical Food/Fluid Intake: regular meals small more like snacks, she does not often take the time or energy to prepare meals, she gets meals on wheels and eat the veggies and beans, gives the milk to a neighbor.  Food Preferences: cheese, cottage cheese, peanut butter, less meat,  Spiritual, Cultural Beliefs, Jain Practices, Values that Affect Care: no  Supplemental Drinks or Food Habits: Ensure Clear, Atkins  Food Allergies: NKFA  Factors Affecting Nutritional Intake: decreased appetite, altered taste, other (see comments) (debility , weakness)    Anthropometrics    Temp: 98 °F (36.7 °C)  Height Method: Stated  Height: 5' 5" (165.1 cm)  Height (inches): " 65 in  Weight Method: Bed Scale  Weight: 73.5 kg (162 lb 0.6 oz)  Weight (lb): 162.04 lb  Ideal Body Weight (IBW), Female: 125 lb  % Ideal Body Weight, Female (lb): 129.63 %  BMI (Calculated): 27  Usual Body Weight (UBW), k.5 kg  % Usual Body Weight: 92.65  % Weight Change From Usual Weight: -7.55 %       Lab/Procedures/Meds    Pertinent Labs Reviewed: reviewed  Pertinent Labs Comments: Hg 7.4, HCt 27.2, BUN 41, glucose 150, AST 47, Alt 93  Pertinent Medications Reviewed: reviewed  Pertinent Medications Comments: ASA, allopurinol, lisinopril, pantoprazole, prednisone, mirtazapine, Acyclovir         Estimated/Assessed Needs    Weight Used For Calorie Calculations: 73.5 kg (162 lb 0.6 oz)  Energy Calorie Requirements (kcal): 6176-3030  Energy Need Method: Kcal/kg (25-30 kcal/kg)  Protein Requirements: 81g  Weight Used For Protein Calculations: 73.5 kg (162 lb 0.6 oz) (x 1.1 gm/kg)  Fluid Requirements (mL): 1837 or per MD  Estimated Fluid Requirement Method: RDA Method  RDA Method (mL): 1837  CHO Requirement: 229g      Nutrition Prescription Ordered    Current Diet Order: Regular  Nutrition Order Comments: thought that pt changed from diabetic to regular to increase intake. She is taking boost glucose TID She has not received the [employer]  Oral Nutrition Supplement: Boost glucose, [employer]    Evaluation of Received Nutrient/Fluid Intake    I/O: -4882 to date  Energy Calories Required: meeting needs  Protein Required: meeting needs  Fluid Required: meeting needs  Comments: LBM   Tolerance: tolerating  % Intake of Estimated Energy Needs: 75 - 100 %  % Meal Intake: 50 - 75 %    Nutrition Risk    Level of Risk/Frequency of Follow-up: low (one time per week)     Monitor and Evaluation    Food and Nutrient Adminstration: diet order  Knowledge/Beliefs/Attitudes: food and nutrition knowledge/skill  Physical Activity and Function: nutrition-related ADLs and IADLs  Anthropometric Measurements: weight  change  Biochemical Data, Medical Tests and Procedures: electrolyte and renal panel, gastrointestinal profile, glucose/endocrine profile     Nutrition Follow-Up    RD Follow-up?: Yes

## 2022-05-19 NOTE — NURSING
Pt AAOx4 w/ VSS during shift. CBG monitored and managed w/ insulin. Pt getting DC and transferred to [facility] (wheelchair transportation setup for 1930). Called to give report and nurse was not able to take report (1st attempt: nurse Juan Manuel [id number]). Chest port is still accessed (heparin order placed and ready to use once pt is about to leave). Night nurse updated on information.

## 2022-05-20 NOTE — PT/OT/SLP EVAL
Occupational Therapy   Evaluation & Treatment     Name: Susan Puente  MRN: [id number]  Admit Date: 5/19/2022  Admitting Diagnosis: Respiratory failure   Recent Surgeries: n/a     General Precautions: Standard, fall   Orthopedic Precautions:N/A   Braces: N/A     Recommendations:     Discharge Recommendations: home with home health  Level of Assistance Recommended: Intermittent assistance   Discharge Equipment Recommendations:  bedside commode, shower chair, walker, rolling  Barriers to discharge:  Inaccessible home environment, Decreased caregiver support    Assessment:     Susan Puente is a 71 y.o. female with a medical diagnosis of Respiratory failure. Pt agreeable to OT evaluation and treatment, tolerating session well with good participation throughout. PLOF reported as independent for ADLs. At this time, pt presents with deficits including weakness, impaired endurance, impaired self care skills, impaired functional mobilty, gait instability, impaired balance, decreased safety awareness, pain, impaired cardiopulmonary response to activity, decreased upper extremity function, and decreased lower extremity function  impacting safety and performance in self care, functional transfers, and functional mobility. Pt demonstrates impaired functional endurance and becomes SOB requiring frequent seated rest breaks during functional tasks. Pt performed most self care tasks with min A-CGA and required mod-min A to complete STS and SPT transfers due to impaired BUE/BLE strength. Pt would benefit from skilled OT services to address identified deficits for improved safety and independence in functional tasks.      Rehab Potential is good    Activity Tolerance: Fair    Plan:     Patient to be seen 6 x/week to address the above listed problems via self-care/home management, therapeutic activities, therapeutic exercises  · Plan of Care Expires: 06/20/22  · Plan of Care Reviewed with: patient    Subjective     Chief  "Complaint: "I do get winded because of the pneumonia."   Patient/Family Comments/goals: to get stronger and go home     Occupational Profile:  Living Environment: Pt lives alone in a 2SH 3STE and 19 steps to 2nd floor. Bed/bath on 2nd floor, with half bath on 1st floor. Pt has a WIS with a shower chair.   Previous level of function: Pt reports independence with all self care tasks PTA and completed functional mobility using a rollator.  Roles and Routines: Pt drives; retired   Equipment Used at Home:  shower chair, rollator  Assistance upon Discharge: Pt has limited assistance from elderly neighbors.     Pain/Comfort:  Pain Rating 1:  (pt did not rate but expressed pain in right shoulder during activity)  Location - Side 1: Right  Location - Orientation 1: generalized  Location 1: shoulder  Pain Addressed 1: Reposition, Distraction  Pain Rating Post-Intervention 1: 0/10    Patients cultural, spiritual, Sikh conflicts given the current situation: no    Objective:     Communicated with: NSG prior to session.  Patient found supine with oxygen, [employer] upon OT entry to room.    Occupational Performance:     Eating   Assistance Needed Independent   CARE Score - Eating 6   Oral Hygiene   Assistance Needed Set-up / clean-up   Comment   (seated in w/c at sink)   CARE Score - Oral Hygiene 5   Toileting Hygiene   Assistance Needed Physical assistance   Physical Assistance Level 25% or less   Comment   (assist for thoroughness in cleaning posterior samanta area; CGA to steady standing to manage clothing over hips)   CARE Score - Toileting Hygiene 3   Shower/Bathe Self   Assistance Needed Physical assistance   Physical Assistance Level 25% or less   Comment   (assist for thoroughness in cleaning posterior samanta area; CGA to steady in standing)   CARE Score - Shower/Bathe Self 3   Upper Body Dressing   Assistance Needed Set-up / clean-up   Physical Assistance Level   (to don/doff pullover shirt)   CARE Score - Upper Body " Dressing 5   Lower Body Dressing   Assistance Needed Incidental touching   Comment   (CGA to steady in standing when managing pants/underwear over hips; pt threads BLEs with increased time)   CARE Score - Lower Body Dressing 4   Putting On/Taking Off Footwear   Assistance Needed Supervision   Comment   (to don/doff bilateral socks; increased time to complete)   CARE Score - Putting On/Taking Off Footwear 4   Toilet Transfer   Assistance Needed Physical assistance   Physical Assistance Level 25% or less   Comment   (min A to complete SPT using RW)   CARE Score - Toilet Transfer 3       Bed Mobility:    · Patient completed Rolling/Turning to Left with  stand by assistance  · Patient completed Scooting to EOB with stand by assistance   · Patient completed Supine to Sit with minimum assistance    Functional Mobility/Transfers:  · Patient completed 4 trials of Sit <> Stand Transfer using RW: Trial 1 and 2: minimum assistance from EOB and w/c; Trial 3 and 4: moderate assistance from w/c and bed side chair level.   · Patient completed Bed > Chair Transfer using Stand Pivot technique with minimum assistance with rolling walker  · Functional Mobility: Pt performed functional mobility of 12 ft in room using RW with CGA and w/c following.     Cognitive/Visual Perceptual:  Cognitive/Psychosocial Skills:     -       Oriented to: Person, Place, Time and Situation   -       Follows Commands/attention:Follows multistep  commands    Physical Exam:  Upper Extremity Range of Motion:     -       Right Upper Extremity: WFL  -       Left Upper Extremity: WFL  Upper Extremity Strength:    -       Right Upper Extremity: grossly 4/5 throughout extremity  -       Left Upper Extremity: grossly 4/5 throughout extremity   Fine Motor Coordination:    -       Intact  Left hand thumb/finger opposition skills and Right hand thumb/finger opposition skills    Therapeutic Exercises:  BUE exercises seated in w/c using red theraband: 10 reps each  exercise   shoulder extension  bicep curls  tricep extension  Rest breaks provided in between exercises as pt becomes SOB.     Pt performed BUE exercises against gravity in standing using RW with CGA for standing balance:   Trial 1 right arm: pt standing to complete 10 reps of shoulder flexion reaching forward/over head and shoulder abduction reaching laterally - pt complains of un-rated pain in right shoulder during exercises   Trail 2 left arm: pt standing to complete 10 reps of shoulder flexion reaching forward/over head and shoulder abduction reaching laterally    Activities provided for increased endurance, standing tolerance/balance, and UE strength/functional use for improved self care tasks.       [facility] 6 Click ADL:  AMPAC Total Score: 19    Treatment & Education:  Pt educated on:   - POC/OT role   - benefit of performing OOB activity   - safety and proper technique when performing self care tasks, functional transfers, and functional mobility  - proper use of AD   - breathing techniques  Pt receptive to education providing verbal understanding on this day.     Education:    Patient left up in chair with all lines intact and call button in reach    GOALS:   Multidisciplinary Problems     Occupational Therapy Goals        Problem: Occupational Therapy    Goal Priority Disciplines Outcome Interventions   Occupational Therapy Goal     OT, PT/OT Ongoing, Progressing    Description: Goals to be met by: 6/3/22    Patient will increase functional independence with ADLs by performing:    UE Dressing with Shannon.  LE Dressing with Modified Shannon.  Grooming while standing at sink with Supervision.  Toileting from toilet with Supervision Assistance for hygiene and clothing management.   Bathing from  shower chair/bench with Supervision.  Step transfer with Supervision using RW.   Toilet transfer to toilet with Supervision.  Upper extremity exercise program with supervision.                         History:      Past Medical History:   Diagnosis Date    Allergic rhinitis     Allergy     Anemia     Anxiety     CAD (coronary artery disease)     Carotid stenosis     Colon polyps 2016    Controlled type 2 diabetes mellitus without complication, without long-term current use of insulin 10/19/2016    Depression     GERD (gastroesophageal reflux disease)     Hearing loss in right ear     Hx of psychiatric care     Celexa, Prozac    Hypokalemia 2/3/2020    MDS (myelodysplastic syndrome) with 5q deletion     Psychiatric problem     Therapy          Past Surgical History:   Procedure Laterality Date    BONE MARROW BIOPSY Left 6/7/2018    Procedure: Biopsy-bone marrow;  Surgeon: Gita Valdes MD;  [facility];  Service: Oncology;  Laterality: Left;    BONE MARROW BIOPSY Left 3/21/2019    Procedure: Biopsy-bone marrow;  Surgeon: Gita Valdes MD;  Location: [facility] OR [address] [place] [address];  Service: Oncology;  Laterality: Left;    BONE MARROW BIOPSY Left 10/24/2019    Procedure: Biopsy-bone marrow;  Surgeon: Gita Valdes MD;  Location: [facility] OR [address] [place] [address];  Service: Oncology;  Laterality: Left;  left iliac crest    BONE MARROW BIOPSY Left 4/21/2021    Procedure: Biopsy-bone marrow;  Surgeon: Gita Valdes MD;  Location: [facility] ENDO (4TH FLR);  Service: Oncology;  Laterality: Left;    BUNIONECTOMY      CAROTID ENDARTERECTOMY Left 2011    CAROTID STENT      COLONOSCOPY N/A 12/20/2016    Procedure: COLONOSCOPY;  Surgeon: PATRICIA Simpson MD;  Location: [facility] ENDO (4TH FLR);  Service: Endoscopy;  Laterality: N/A;  pt has vomiting with anesthesia in past    ENDOMETRIAL ABLATION      for endometriosis    INSERTION OF TUNNELED CENTRAL VENOUS CATHETER (CVC) WITH SUBCUTANEOUS PORT N/A 2/19/2020    Procedure: [id number];  Surgeon: Dosc Diagnostic Provider;  Location: [facility] OR [address] [place] [address];  Service: Radiology;  Laterality: N/A;  189    TONSILLECTOMY      TYMPANOSTOMY TUBE PLACEMENT         Time Tracking:     OT Date of  Treatment: 05/20/22  OT Start Time: 0738  OT Stop Time: 0829  OT Total Time (min): 51 min    Billable Minutes:Evaluation 21  Self Care/Home Management 20  Therapeutic Exercise 10    5/20/2022

## 2022-05-20 NOTE — PROGRESS NOTES
Burak Cordova - Oncology ([facility])  Adult Nutrition  Consult Note    SUMMARY   Recommendations  Continue 2000 diabetic diet, boost glucose, chocolate TID,follow glucose  Goals: PO to meet 85% of EEN/EPN with ONS by next RD follow up  Nutrition Goal Status: new  Communication of RD Recs: informed MD    Assessment and Plan  Nutrition Problem:  Moderate/Severe Protein-Calorie Malnutrition  Malnutrition in the context of Chronic Illness/Injury    Related to (etiology):  Decline in ADL's, taste changes    Signs and Symptoms (as evidenced by):  Energy Intake: <75% of estimated energy requirement for > one month  Body Fat Depletion: mild and moderate depletion of orbital's, triceps and thoracic and lumbar region   Muscle Mass Depletion: mild and moderate depletion of temples, clavicle region, interosseous muscle and lower extremities   Fluid Accumulation: moderate    Interventions(treatment strategy):  General diet   Nutrition education 5/19  Commercial beverage(calories and protein) boost glucose TID,   Collaboration with other providers    Nutrition Diagnosis Status:  Continues           Malnutrition Assessment  5/19/22           Skin (Micronutrient): dry, pallor  Hair/Scalp (Micronutrient): dry  Teeth (Micronutrient): broken dentition  Neck/Chest (Micronutrient): muscle wasting  Musculoskeletal/Lower Extremities: muscle wasting       Energy Intake (Malnutrition): less than 75% for greater than or equal to 1 month   Orbital Region (Subcutaneous Fat Loss): moderate depletion  Upper Arm Region (Subcutaneous Fat Loss): mild depletion  Thoracic and Lumbar Region: well nourished   Sikh Region (Muscle Loss): moderate depletion  Clavicle Bone Region (Muscle Loss): moderate depletion  Clavicle and Acromion Bone Region (Muscle Loss): moderate depletion  Dorsal Hand (Muscle Loss): moderate depletion  Patellar Region (Muscle Loss): moderate depletion  Anterior Thigh Region (Muscle Loss): moderate depletion  Posterior Calf Region  (Muscle Loss): moderate depletion                                   Reason for Assessment  New admit  Diagnosis: cancer diagnosis/related complications, pulmonary disease (respiratory failure)  Relevant Medical History: DM, CAD, MDS, Gout, debility, chemo,  Interdisciplinary Rounds: did not attend  General Information Comments: Patient receptive to education on how to improve PO intake at home, she thought she was lactose intolerant due to symptoms when she was using single serving bottled [employer] chocolate milk, however that may be tx or Rx induced as [employer] milk is very low lactose due to filtering process. patient was not checking her blood glucose at home and PO was poor. she has had stable wt this past year in the 160's. In the previous year she had a 10-15 # wt loss in 2021. NFPE completed showing moderate loss fat and muscle. She has experienced taste problems since 12/2021. She tries to follow a diabetic diet but had not been prior to admit. She wants a new glucometer even if she has to pay for the materials herself. She has had moderate malnutrition since 12/21. Her edema is resolving.  Nutrition Discharge Planning: DC on diabetic diet ONS of choice     Nutrition/Diet History      Patient Reported Diet/Restrictions/Preferences: diabetic diet  Typical Food/Fluid Intake: regular meals small more like snacks, she does not often take the time or energy to prepare meals, she gets meals on wheels and eat the veggies and beans, gives the milk to a neighbor.  Food Preferences: cheese, cottage cheese, peanut butter, less meat, no grits send potato or toast  Spiritual, Cultural Beliefs, Mandaen Practices, Values that Affect Care: no  Supplemental Drinks or Food Habits: Ensure Clear, Atkins  Food Allergies: NKFA  Factors Affecting Nutritional Intake: decreased appetite, altered taste, other (see comments) (debility , weakness)    Anthropometrics there is a weight discrepancy between two hospitals,  "re-weigh  Previous wt was 73.6 kg  Temp: 97.6 °F (36.4 °C)  Height: 5' 5" (165.1 cm)  Height (inches): 65 in  Weight Method: Standard Scale  Weight: 66.3 kg (146 lb 2.6 oz)  Weight (lb): 146.17 lb  Ideal Body Weight (IBW), Female: 125 lb  % Ideal Body Weight, Female (lb): 116.94 %  BMI (Calculated): 24.3       Lab/Procedures/Meds    Pertinent Labs Reviewed: reviewed  Pertinent Labs Comments: Hg 7.4, HCt 27.2, BUN 41, glucose 150, AST 47, Alt 93  Pertinent Medications Reviewed: reviewed  Pertinent Medications Comments: ASA, allopurinol, lisinopril, pantoprazole, prednisone, mirtazapine, Acyclovir       Estimated/Assessed Needs       Weight Used For Calorie Calculations: 73.5 kg (162 lb 0.6 oz)  Energy Calorie Requirements (kcal): 9159-5515  Energy Need Method: Kcal/kg (25-30 kcal/kg)  Protein Requirements: 81g  Weight Used For Protein Calculations: 73.5 kg (162 lb 0.6 oz) (x 1.1 gm/kg)  Fluid Requirements (mL): 1837 or per MD  Estimated Fluid Requirement Method: RDA Method  RDA Method (mL): 1837  CHO Requirement: 229g                           Nutrition Prescription Ordered   Current Diet Order: diabetic  Nutrition Order Comments: was regular, thought that pt changed from diabetic to regular to increase intake. Her appetite is back.  She is taking boost glucose   Oral Nutrition Supplement: Boost glucose,     Evaluation of Received Nutrient/Fluid Intake     % Intake of Estimated Energy Needs: 75 - 100 %  % Meal Intake: 50 - 75 %    Nutrition Risk  Level of Risk/Frequency of Follow-up: low (one time per week)         Monitor and Evaluation      Food and Nutrient Adminstration: diet order  Knowledge/Beliefs/Attitudes: food and nutrition knowledge/skill  Physical Activity and Function: nutrition-related ADLs and IADLs  Anthropometric Measurements: weight change  Biochemical Data, Medical Tests and Procedures: electrolyte and renal panel, gastrointestinal profile, glucose/endocrine profile        Nutrition Follow-Up      "   RD Follow-up?: Yes

## 2022-05-20 NOTE — PHYSICIAN QUERY
PT Name: Susan Punete  MR #: [id number]    DOCUMENTATION CLARIFICATION     CDS: Rafiq De La Cruz RN CCDS               Contact information: [email or web address]     This form is a permanent document in the medical record.     Query Date: May 20, 2022    By submitting this query, we are merely seeking further clarification of documentation.. Please utilize your independent clinical judgment when addressing the question(s) below.    The medical record contains the following:   Indicators  Supporting Clinical Findings Location in Medical Record     x % of Estimated Energy Intake over a time frame from p.o., TF, or TPN <75% of estimated energy requirement for > one month 2022 RD progress notes     x Weight Status over a time frame she has had stable wt this past year in the 160's. In the previous year she had a 10-15 # wt loss in .   % Weight Change From Usual Weight: -7.55 % 2022 RD progress notes     x Subcutaneous Fat and/or Muscle Loss Body Fat Depletion: mild and moderate depletion of orbitals, triceps and thoracic and lumbar region   Muscle Mass Depletion: mild and moderate depletion of temples, clavicle region, interosseous muscle and lower extremities 2022 RD progress notes     x Fluid Accumulation or Edema Fluid Accumulation: moderate 2022 RD progress notes     x Wt/BMI/Usual Body Weight Weight: 73.5 kg (162 lb 0.6 oz)  Usual Body Weight (UBW), k.5 kg  BMI (Calculated): 27 2022 RD progress notes    Delayed Wound Healing/Failure to Thrive       x Acute or Chronic Illness MDS on treatment with Vidaza (currently receiving decitabine due to national Vidaza shortage)  Acute respiratory failure with hypoxia  CAP  Pancytopenia due to antineoplastic chemotherapy  Controlled type 2 diabetes mellitus   Adjustment disorder with mixed anxiety and depressed mood 2022 DC summary    Medication       x Treatment Interventions(treatment strategy):  General diet   Nutrition education  5/19  Commercial beverage(calories and protein) boost glucose TID, [employer] TID  Collaboration with other providers 5/19/2022 RD progress notes     x Other Nutrition Problem:  Moderate/Severe Protein-Calorie Malnutrition  Malnutrition in the context of Chronic Illness/Injury     Related to (etiology):  Decline in ADL's, taste changes 5/19/2022 RD progress notes     AND / ASPEN Clinical Characteristics (October 2011)  A minimum of two characteristics is recommended for diagnosing either moderate or severe malnutrition   Mild Malnutrition Moderate Malnutrition Severe Malnutrition   Energy Intake from p.o., TF or TPN. < 75% intake of estimated energy needs for less than 7 days < 75% intake of estimated energy needs for greater than 7 days < 50% intake of estimated energy needs for > 5 days   Weight Loss 1-2% in 1 month  5% in 3 months  7.5% in 6 months  10% in 1 year 1-2 % in 1 week  5% in 1 month  7.5% in 3 months  10% in 6 months  20% in 1 year > 2% in 1 week  > 5% in 1 month  > 7.5% in 3 months  > 10% in 6 months  > 20% in 1 year   Physical Findings     None *Mild subcutaneous fat and/or muscle loss  *Mild fluid accumulation  *Stage II decubitus  *Surgical wound or non-healing wound *Mod/severe subcutaneous fat and/or muscle loss  *Mod/severe fluid accumulation  *Stage III or IV decubitus  *Non-healing surgical wound     Provider, please specify diagnosis or diagnoses associated with above clinical findings.    [  x ] Moderate Protein-Calorie Malnutrition   [   ] Severe Protein-Calorie Malnutrition   [   ] Other Nutritional Diagnosis (please specify): _______   [   ] Malnutrition ruled out   [  ] Clinically Undetermined       Please document in your progress notes daily for the duration of treatment until resolved and include in your discharge summary.

## 2022-05-20 NOTE — ASSESSMENT & PLAN NOTE
Moderate/Severe Protein-Calorie Malnutrition  Malnutrition in the context of Chronic Illness/Injury     Related to (etiology):  Decline in ADL's, taste changes     Signs and Symptoms (as evidenced by):  Energy Intake: <75% of estimated energy requirement for > one month  Body Fat Depletion: mild and moderate depletion of orbital's, triceps and thoracic and lumbar region   Muscle Mass Depletion: mild and moderate depletion of temples, clavicle region, interosseous muscle and lower extremities   Fluid Accumulation: moderate     Interventions(treatment strategy):  General diet   Nutrition education 5/19  Commercial beverage(calories and protein) boost glucose TID,   Collaboration with other providers     Nutrition Diagnosis Status:  Continues

## 2022-05-20 NOTE — NURSING
Patient arrived to [facility] via wheelchair. Patient is here for PT/OT for debility and respiratory failure. Patient was assisted into bed with belongings placed at bedside. Patient is on 2 L NC. VS stable: /64, Pulse 83, SP02 94% on 2L NC, Temp 97.8, RR 18. Patient has gauze dressing to right chest, Port was de accessed prior to arrival.  Patient has small wound to sacral spine, pink in color, triad cream applied. pure wick put in place.  Patient does not complain of any pain at this time. Bed is in lowest position, wheels locked, and call bell within reach. Will Continue to monitor.

## 2022-05-20 NOTE — PLAN OF CARE
Problem: Physical Therapy  Goal: Physical Therapy Goal  Description: PT goals until 6/3/22    1. Pt sit to stand with RW with supervision-not met  2. Pt to perform gait 200ft with RW with supervision.-not met  3. Pt to go up/down curb step with RW with SBA.-not met  4. Pt to up/down 12 steps with R UE rail with no AD with CGA.-not met  5. Pt to perform B LE exs in sitting or supine x 15 reps to strengthen B LE to improve functional mobility.-not met  6. Pt to propel w/c 100ft with B UE on level surface with mod independent-not met  7. Pt to  object off the floor with reacher with RW support with set up assist-not met  8. Pt to ambulated 10ft over uneven surface with Rw with supervision-not met    Outcome: Ongoing, Progressing   Pt's goals set and pt will benefit from skilled PT services to work towards improved functional mobility including: bed mobility, transfers, w/c mobility, up/down steps, and gait.   5/20/2022

## 2022-05-20 NOTE — PLAN OF CARE
Problem: Adult Inpatient Plan of Care  Goal: Plan of Care Review  Outcome: Ongoing, Progressing  Goal: Patient-Specific Goal (Individualized)  Outcome: Ongoing, Progressing  Goal: Readiness for Transition of Care  Outcome: Ongoing, Progressing     Problem: Diabetes Comorbidity  Goal: Blood Glucose Level Within Targeted Range  Outcome: Ongoing, Progressing     Problem: Infection  Goal: Absence of Infection Signs and Symptoms  Outcome: Ongoing, Progressing     Problem: Fall Injury Risk  Goal: Absence of Fall and Fall-Related Injury  Outcome: Ongoing, Progressing

## 2022-05-20 NOTE — PROGRESS NOTES
"                                                        [facility]                                  Skilled Nursing Facility                   Progress Note     Admit Date: 5/19/2022  SHABBIR TBD  Principal Problem:  Acute respiratory failure with hypoxia   HPI obtained from patient interview and chart review     Chief Complaint: Establish Care/ Initial Visit     HPI:    Ms. Puente is a 71- year old patient with PMHx of MDS on treatment with Vidaza (currently receiving decitabine due to national Vidaza shortage; Dr. Valdes pt),  DM2, HTN, CAD, anxiety, and depression  Who presents to SNF following hospitalization for acute respiratory failure with hypoxia.    Per  Chart review, "She was admitted 4/23/22 to 4/27/22 with CAP. She was discharged on LVQ, which she will complete on 4/30. She was discharged 4/27/22 with home health and home oxygen. She presented to the ED on 4/29 with complaint of bilat foot pain making it difficult to ambulate at home. Redness noted to bilat great toes. X-ray showing soft tissue edema suggestive of gout to right foot and bunion to left foot. She has a history of gout per patient. She will be admitted to BMT service under observation status. PT/OT will be consulted for eval of SNF appropriateness. She reports that her breathing is significantly improved. Ms. Puente was admitted inpatient on April 29th, shortly after being discharged from the hospital, with an acute gout flare and worsening respiratory status and fever, concerning for pneumonia and acute hypoxic respiratory failure. She was initiated on colchicine and allopurinol with improvement in gout flare. Unfortunately her prolonged hospital course was complicated by worsening respiratory status, requiring supplemental O2 and ongoing IV antibiotics. Bcx remained negative, but imaging revealed worsening RML consolidation. Ultimately, pulmonary was consulted and the patient underwent on 5/11 that was non-revealing of " "infectious source/malignancy, but did revealed inflammation. The patient was started on a steroid taper with some moderate improvement in her respiratory status and ability to participate with therapy teams. Additional complications this hospitalization included poor PO intake/appetite, which have also improved with steroids and the addition of remeron; and resistant hypertension, which required changes to her anti-HTN regimen and an addition of nifidepine XL. Throughout stay, given her physical deconditioning, PT/OT continued to recommend SNF post-discharge. She was medically ready for discharge for several days prior to SNF bed availability and ultimately d/c'd to SNF on 5/19 in improved condition."    Today, patient reports chronic pain to her bilateral shoulders.  Patient is currently utilizing supplemental O2 at 2 L nasal cannula and states that her respiratory status has greatly improved.  Patient is still experiencing mild gout symptoms to her right foot.     Patient will be treated at [facility] with PT and OT to improve functional status and ability to perform ADLs.     Past Medical History: Patient has a past medical history of Allergic rhinitis, Allergy, Anemia, Anxiety, CAD (coronary artery disease), Carotid stenosis, Colon polyps (2016), Controlled type 2 diabetes mellitus without complication, without long-term current use of insulin (10/19/2016), Depression, GERD (gastroesophageal reflux disease), Hearing loss in right ear, psychiatric care, Hypokalemia (2/3/2020), MDS (myelodysplastic syndrome) with 5q deletion, Psychiatric problem, and Therapy.    Past Surgical History: Patient has a past surgical history that includes Tonsillectomy; Carotid stent; Carotid endarterectomy (Left, 2011); Bunionectomy; Endometrial ablation; Colonoscopy (N/A, 12/20/2016); Tympanostomy tube placement; Bone marrow biopsy (Left, 6/7/2018); Bone marrow biopsy (Left, 3/21/2019); Bone marrow biopsy (Left, 10/24/2019); " Insertion of tunneled central venous catheter (CVC) with subcutaneous port (N/A, 2/19/2020); and Bone marrow biopsy (Left, 4/21/2021).    Social History: Patient reports that she quit smoking about 5 years ago. Her smoking use included cigarettes and vaping with nicotine. She has a 75.00 pack-year smoking history. She has never used smokeless tobacco. She reports that she does not drink alcohol and does not use drugs.    Family History: family history includes Alcohol abuse in her brother and father; Cancer in her paternal grandmother; Heart disease in her father and mother; Hyperlipidemia in her mother.    Allergies: Patient is allergic to revlimid [lenalidomide].    ROS  Constitutional: Negative for fever   Eyes: Negative for blurred vision, double vision   Respiratory: Negative for cough, shortness of breath   Cardiovascular: Negative for chest pain, palpitations, and leg swelling.   Gastrointestinal: Negative for abdominal pain, constipation, diarrhea, nausea, vomiting.   Genitourinary: Negative for dysuria, frequency   Musculoskeletal:  + generalized weakness. Negative for back pain and myalgias.   Skin: Negative for itching and rash.   Neurological: Negative for dizziness, headaches.   Psychiatric/Behavioral: Negative for depression. The patient is not nervous/anxious.      24 hour Vital Sign Range   Temp:  [97.6 °F (36.4 °C)-98.4 °F (36.9 °C)]   Pulse:  [70-95]   Resp:  [18-22]   BP: (131-152)/(59-69)   SpO2:  [91 %-95 %]     PEx  Constitutional: Patient appears debilitated.  No distress noted  HENT:   Head: Normocephalic and atraumatic.   Eyes: Pupils are equal, round  Neck: Normal range of motion. Neck supple.   Cardiovascular: Normal rate, regular rhythm and normal heart sounds.    Pulmonary/Chest: Effort normal and breath sounds are clear with mild crackles her to left lower lobe.  Supplemental oxygen in progress.  Abdominal: Soft. Bowel sounds are normal.   Musculoskeletal: Normal range of motion.    Neurological: Alert and oriented to person, place, and time.   Psychiatric: Normal mood and affect. Behavior is normal.   Skin: Skin is warm and dry. Full skin assessment   Completed.  Redness to right foot a great toe    No results for input(s): GLUCOSE, NA, K, CL, CO2, BUN, CREATININE, MG in the last 24 hours.    Invalid input(s):  CALCIUM    No results for input(s): WBC, RBC, HGB, HCT, PLT, MCV, MCH, MCHC in the last 24 hours.      Assessment and Plan:    Acute respiratory failure with hypoxia  - Patient admitted with acute hypoxic respiratory failure, she was treated for PNA during recent admission and was initiated on home oxygen after 6 min walk test previous admission.  - CXR with pulmonary consolidation and fever on admission, recently completed levaquin/azithro course for CAP.   - Completed Cefepime course.  - Continue scheduled DuoNebs  - 5/9: Pulmonary consulted given increasing O2 requirements.   - S/p Bronch 5/11 AM. AFB/KOH staining was negative. Gram stain negative. Remaining cultures/studies NGTD. Bronch cytology with macrophages and inflammation.  - Continue Prednisone taper 60mg x 2wks, then taper down by 10mg every two weeks until complete.  - Given prolonged steroid taper, will continue GI ppx and dapsone for PJP ppx  - Will f/u with pulm outpatient      Acute gout of foot  - patient reports difficulty bearing weight on bilat feet due to pain, improving  - x-ray showing soft tissue edema suggestive of gout flare  - uric acid wnl  - allopurinol 100 mg daily for prevention  -   Prednisone taper also to treat     MDS (myelodysplastic syndrome)  - Primary oncologist, Dr. Gita Valdes  PER [facility] [place]:  - Initially with 5 q minus syndrome and treated with Revlimid. Developed allergy and was then desensitized. Soon after developed pancytopenia and Revlimid was stopped June 2017.    - BMBX on 6/8/17 was with normal cytogenetics. 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 Discussed treatment options of  HMA therapy or repeat trial of Revlimid and patient wished to proceed with Revlimid   - Revlimid stopped again due to repeat allergic reaction and pancytopenia 3/2019  - Started cycle 1 Vidaza 5/6/19 subq for 5 days every 28 days  - Restaging bone marrow biopsy following cycle 5 from 10/24/19 shows persistent MDS, no excess blasts and 3 of 20 metaphases with 5q deletion  - Restaging marrow on 04/21/21 with persistent MDS with isolated del 5Q. Of 20 metaphases, 5 were normal and 15 had a 5q deletion, NGS positive for SF3B1 and TP53 (12%). 1.4% blasts  - Received vidaza for 22 cycles, received Decitabine for C23 due to national shortage of vidaza and then back to vidaza with C24.  - completed cycle 39 on 4/8/2  - due for cycle 40 on 05/02/2022, postponed whiled admitted and to be readdressed as outpatient      Essential hypertension  - At home regimen: lisinopril/hctz and coreg  - Per [facility], Discussed with cardiology who recommended switching HCTZ to Chlorthalidone 25mg daily and adding Nifedipine XL 60mg with outpatient follow up. Nifedipine was held d/t hypotension and to allow for some permissible hypertension given clinical status  -  Continue Current anti-HTN regimen: Lisinopril 40mg daily, Coreg 25mg BID, Chlorthalidone 25mg daily. Nifedipine XL 60mg on  - Will need cardiology f/u outpatient     Pancytopenia due to antineoplastic chemotherapy  - Transfuse for hgb < 7 or plts < 10  - Continue acyclovir ppx  - Cefepime and fluconazole d/c'd 5/12 given normal WBC and afebrile   -  Monitor twice weekly CBCs     CAP (community acquired pneumonia)  - Pt with 2 weeks of productive cough, nasal congestion  - Upon arrival to the hospial she was hypoxic requiring 3L NC, weaned to 2L today  - CXR with likely develop consolidation  - CTA shows New patchy consolidative opacities in the in the right upper and posterior right lower lobes.  Findings suggestive infectious etiology such as pneumonia, multifocal pneumonia.  Also  consider atypical given patient's reported immunocompromised status.  Aspiration could present similarly.  Consider continued follow-up after acute clinical illness has resolved to ensure resolution. New mediastinal and bilateral hilar lymphadenopathy, likely reactive.  - completed azithromycin and 7 day course of Levaquin on 4/30.  - CXR on admission showing possible developing consolidation: Cefepime 4/30-5/3    - See acute respiratory failure with hypoxia above     Controlled type 2 diabetes mellitus without complication, without long-term current use of insulin  - Will hold home po diabetic medications  -   Accu-Cheks AC/HS, diabetic diet  - Goal bg 140-180  -  Continue on low-dose sliding scale insulin    Insomnia  - Continue nightly melatonin 9 mg qHS, remeron 7.5 mg qHS.       Adjustment disorder with mixed anxiety and depressed mood  - continue home celexa 20 mg daily     CAD (coronary artery disease)  - S/P left carotid endarterectomy prior to cancer dx, s/p PCI (3 stents) >20 years ago   - Continue on ASA 81mg daily, platelets are wnl  - On repatha as outpatient (statin intolerant)    Debility   - Continue with PT/OT for gait training and strengthening and restoration of ADL's   - Encourage mobility, OOB in chair, and early ambulation as appropriate  - Fall precautions   - Monitor for bowel and bladder dysfunction  - Monitor for and prevent skin breakdown and pressure ulcers  - Continue DVT prophylaxis with  frequent ambulation         Anticipate disposition:  Home with home health      Follow-up needed during SNF stay-    Appointments not to send patient to- 5/30, 5/31    Follow-up needed after discharge from SNF:   - PCP, hospital follow-up, needs to be scheduled  - Oncology, scheduled for after SNF discharge    Future Appointments   Date Time Provider Department Center   5/30/2022 10:30 AM [facility] LAB BMT [facility] LABB[facility] Fidencio Jones   5/31/2022 11:00 AM NURSE 11, [facility] CHEMO [facility] CHEMO Fidencio Jones   6/2/2022  12:30 PM [facility]   6/2/2022  1:30 PM Gita Valdes MD [facility] HC BMT [facility]   6/3/2022 11:00 AM NURSE 11, [facility]         I certify that SNF services are required to be given on an inpatient basis because Susan Puente needs for skilled nursing care and/or skilled rehabilitation are required on a daily basis and such services can only practically be provided in a skilled nursing facility setting and are for an ongoing condition for which she received inpatient care in the hospital.     Total time of the visit 112 minutes [id number]  Non physical exam/ non charting time: 69 minutes   Description of non physical exam/non charting time: counseling patient on clinical conditions and therapies provided regarding recent hospitalization, supplemental oxygen, antihypertensive medication regimen, prednisone taper, gout attack, importance of PO nutrition hydration, but does with therapy and the beginning of discharge planning.  Extensive chart review completed including all consultation notes.  All pertinent laboratory and radiographical images reviewed.        Fidelina Maxwell NP  Department of Hospital Medicine   [facility]     DOS: 5/20/2022       Patient note was created using MModal Dictation.  Any errors in syntax or even information may not have been identified and edited on initial review prior to signing this note.

## 2022-05-20 NOTE — PLAN OF CARE
Continue 2000 diabetic diet, boost glucose, chocolate TID,follow glucose  Goals: PO to meet 85% of EEN/EPN with ONS by next RD follow up  Nutrition Goal Status: new  Communication of RD Recs: informed MD     Assessment and Plan  Nutrition Problem:  Moderate/Severe Protein-Calorie Malnutrition  Malnutrition in the context of Chronic Illness/Injury     Related to (etiology):  Decline in ADL's, taste changes     Signs and Symptoms (as evidenced by):  Energy Intake: <75% of estimated energy requirement for > one month  Body Fat Depletion: mild and moderate depletion of orbital's, triceps and thoracic and lumbar region   Muscle Mass Depletion: mild and moderate depletion of temples, clavicle region, interosseous muscle and lower extremities   Fluid Accumulation: moderate     Interventions(treatment strategy):  General diet   Nutrition education 5/19  Commercial beverage(calories and protein) boost glucose TID,   Collaboration with other providers     Nutrition Diagnosis Status:  Continues

## 2022-05-20 NOTE — PT/OT/SLP EVAL
"Physical Therapy Evaluation    Patient Name:  Susan Puente   MRN:  [id number]  Admit Date: 5/19/2022  Admitting Diagnosis: Acute respiratory failure with hypoxia  General Precautions: Standard, fall   Orthopedic Precautions:N/A   Braces: N/A     Recommendations:     Discharge Recommendations:  home with home health   Level of Assistance Recommended: Intermittent assistance   Discharge Equipment Recommendations: bedside commode, walker, rolling   Barriers to discharge:  19 steps to her bed/bathroom and lives alone    Assessment:     Susan Puente is a 71 y.o. female admitted with a medical diagnosis of Acute respiratory failure with hypoxia .  Performance deficits affecting function  weakness, impaired self care skills, impaired functional mobilty, impaired endurance, gait instability, decreased lower extremity function, decreased upper extremity function, decreased safety awareness, impaired cardiopulmonary response to activity, impaired balance .Pt is unsafe with functional mobility at this time due to pt requires supervision for bed mobility, minimal to moderate assist for transfers, minimal to moderate assist for up/down steps, and minimal assist for gait due to weakness and instability. Pt is motivated to progress with functional mobility.    Rehab Potential is good     Activity Tolerance: Good    Plan:     Patient to be seen 6 x/week to address the above listed problems via gait training, therapeutic activities, therapeutic exercises, wheelchair management/training, neuromuscular re-education     Plan of Care Expires: 06/19/22  Plan of Care Reviewed with: patient    Subjective   "I live alone and I am used to doing things on my own time, I really need a schedule"   Patient/Family Comments/goals: "to be able to focus and get back to doing things on my own"  Pain/Comfort:  · Pain Rating 1: 0/10 (pt states she has arthritis in her shoulders but knows she needs to get them stronger)  · Pain Rating " "Post-Intervention 1: 0/10    Living Environment: pt lives alone in a 2 story home with 3 LIGIA (1 step then platform, then another step and a platform, then step into home) and 19 steps with R UE rail to her bed and bathroom  Prior to admission, patients level of function was independent prior to initial hospital stay with pneumonia, used a rollator when outside the home.  Equipment used at home: shower chair, rollator.  Upon discharge, patient will have assistance from unknown.    Patients cultural, spiritual, Taoist conflicts given the current situation: no    Objective:     Communicated with nurse prior to session.  Patient found up in chair with oxygen  upon PT entry to room.    Exams:  · Cognitive Exam:  Patient is oriented to Person, Place, Time and Situation  · Sensation:    · -       Intact  light/touch B LE  · RLE ROM: WFL  · RLE Strength: Deficits: hip flex 4-/5; knee flex/ext 4/5; ankle DF 4/5;  · LLE ROM: WFL  · LLE Strength: Deficits: hip flex 4-/5; knee flex/ext 4/5; ankle DF 4/5    Functional Mobility:  · Bed Mobility:     · Rolling Left:  modified independence  · Rolling Right: modified independence  · Supine to Sit: supervision  · Sit to Supine: supervision  · Transfers:     · Sit to Stand:  minimum assistance from elevated bed and moderate assistance from bedside chair and w/c with rolling walker  · Bed to Chair: moderate assistance with  no AD  using  Stand Pivot  · Gait: 14ft then 70ft then 60ftthen 30ft x 2 trials with RW with minimal assist. Pt performed gait with step to gait, decreased step length, and at slow pace. Pt focused on upright posture and acheiving heel toe gait throughout trials.   · Stairs:  Pt ascended/descended 4 stair(s) with No Assistive Device with bilateral handrails with Minimal Assistance.   · Up/down 4" curb step with Rw with moderate assist with verbal and manual cues for proper technioque  · Wheelchair Propulsion:  Pt propelled Standard wheelchair x 50 feet on Level " tile with  Bilateral upper extremity with Stand-by Assistance.     Therapeutic Activities and Exercises: pt performed B LE exs on cross  x 10 min on notch 2 resistance.    AM-PAC 6 CLICK MOBILITY  Total Score:18     Patient left up in chair with call button in reach and nurse notified.    GOALS:   Multidisciplinary Problems     Physical Therapy Goals        Problem: Physical Therapy    Goal Priority Disciplines Outcome Goal Variances Interventions   Physical Therapy Goal     PT, PT/OT Ongoing, Progressing     Description: PT goals until 6/3/22    1. Pt sit to stand with RW with supervision-not met  2. Pt to perform gait 200ft with RW with supervision.-not met  3. Pt to go up/down curb step with RW with SBA.-not met  4. Pt to up/down 12 steps with R UE rail with no AD with CGA.-not met  5. Pt to perform B LE exs in sitting or supine x 15 reps to strengthen B LE to improve functional mobility.-not met  6. Pt to propel w/c 100ft with B UE on level surface with mod independent-not met  7. Pt to  object off the floor with reacher with RW support with set up assist-not met  8. Pt to ambulated 10ft over uneven surface with Rw with supervision-not met                     History:     Past Medical History:   Diagnosis Date    Allergic rhinitis     Allergy     Anemia     Anxiety     CAD (coronary artery disease)     Carotid stenosis     Colon polyps 2016    Controlled type 2 diabetes mellitus without complication, without long-term current use of insulin 10/19/2016    Depression     GERD (gastroesophageal reflux disease)     Hearing loss in right ear     Hx of psychiatric care     Celexa, Prozac    Hypokalemia 2/3/2020    MDS (myelodysplastic syndrome) with 5q deletion     Psychiatric problem     Therapy        Past Surgical History:   Procedure Laterality Date    BONE MARROW BIOPSY Left 6/7/2018    Procedure: Biopsy-bone marrow;  Surgeon: Gita Valdes MD;  Location: [facility] OR [address] [place] [address];  Service:  Oncology;  Laterality: Left;    BONE MARROW BIOPSY Left 3/21/2019    Procedure: Biopsy-bone marrow;  Surgeon: Gita Valdes MD;  [facility];  Service: Oncology;  Laterality: Left;    BONE MARROW BIOPSY Left 10/24/2019    Procedure: Biopsy-bone marrow;  Surgeon: Gita Valdes MD;  [facility];  Service: Oncology;  Laterality: Left;  left iliac crest    BONE MARROW BIOPSY Left 4/21/2021    Procedure: Biopsy-bone marrow;  Surgeon: Gita Valdes MD;  Location: [facility] ENDO (4TH FLR);  Service: Oncology;  Laterality: Left;    BUNIONECTOMY      CAROTID ENDARTERECTOMY Left 2011    CAROTID STENT      COLONOSCOPY N/A 12/20/2016    Procedure: COLONOSCOPY;  Surgeon: PATRICIA Simpson MD;  [facility]  Service: Endoscopy;  Laterality: N/A;  pt has vomiting with anesthesia in past    ENDOMETRIAL ABLATION      for endometriosis    INSERTION OF TUNNELED CENTRAL VENOUS CATHETER (CVC) WITH SUBCUTANEOUS PORT N/A 2/19/2020    Procedure: [id number];  Surgeon: Dosc Diagnostic Provider;  Location: [address];  Service: Radiology;  Laterality: N/A;  189    TONSILLECTOMY      TYMPANOSTOMY TUBE PLACEMENT         Time Tracking:     PT Received On: 05/20/22  PT Start Time: 1122     PT Stop Time: 1216  PT Total Time (min): 54 min     Billable Minutes: Evaluation 20, Gait Training 24 and Therapeutic Exercise 10      05/20/2022

## 2022-05-21 NOTE — PT/OT/SLP PROGRESS
Physical Therapy Treatment    Patient Name:  Susan Puente   MRN:  [id number]  Admit Date: 5/19/2022  Admitting Diagnosis: Acute respiratory failure with hypoxia  Recent Surgeries: N/A  General Precautions: Standard, fall   Orthopedic Precautions:N/A   Braces: N/A     Recommendations:     Discharge Recommendations:  home with home health   Level of Assistance Recommended at Discharge: Intermittent assistance   Discharge Equipment Recommendations: bedside commode, walker, rolling   Barriers to discharge: Inaccessible home environment, Decreased caregiver support    Assessment:     Susan Puente is a 71 y.o. female admitted with a medical diagnosis of Acute respiratory failure with hypoxia . Patient demonstrated an increased tolerance to today's session only needing 3 rest periods due to SOB. Patient demonstrated increased difficulty with STS needing more assistance indicating decreased quadriceps and glutes strength. Patient continues to require skilled physical therapy services to address deficits mentioned in order for patient to return to her daily life with no limitations.  .    Performance deficits affecting function: weakness, impaired endurance, impaired self care skills, impaired functional mobilty, gait instability, impaired balance, impaired cardiopulmonary response to activity .    Rehab Potential is good    Activity Tolerance: Good    Plan:     Patient to be seen 6 x/week to address the above listed problems via gait training, therapeutic activities, therapeutic exercises, neuromuscular re-education, wheelchair management/training    · Plan of Care Expires: 06/19/22  · Plan of Care Reviewed with: patient    Subjective     Patient reports no pain and is agreeable to participate in therapy today.     Pain/Comfort:  Pain Rating 1: 0/10  Pain Rating Post-Intervention 1: 0/10    Patient's cultural, spiritual, Shinto conflicts given the current situation:  no    Objective:     Communicated with  nursing staff prior to session.  Patient found up in wc with oxygen (2L via nasal canula) upon PT entry to room.     Therapeutic Activities and Exercises:   Standing Exercises: Marches > calf raises, both 20 reps > mini squats (15 reps) using high mat for support with wc positioned directly behind patient.  Lower extremity bike: x10 minutes with moderate resistance    Functional Mobility:  Transfers:     · Sit to Stand: moderate assistance with rolling walker. Patient performed 3 rocks back and forth before pushing to stand up.   · Gait: Patient ambulated 93 feet + 70 feet with RW and CGA. She demonstrated a reciprocal gait patten with slow vinnie having to focus on task at hand.  · Stairs: Pt ascended/descended 4 stair(s) with No Assistive Device with right handrail with Minimal Assistance. She used a forward step to pattern for ascending stairs and sidestep pattern for descending.  · Curb step: Patient ascended/descended 4 inch curb step with Suki and extended time to focus on task to perform safely.  · Wheelchair Propulsion: Pt propelled Standard wheelchair x ~100 feet on Level tile with  Bilateral upper extremity with Supervision.     AM-PAC 6 CLICK MOBILITY  18    Patient left up in chair with call button in reach.    GOALS:   Multidisciplinary Problems     Physical Therapy Goals        Problem: Physical Therapy    Goal Priority Disciplines Outcome Goal Variances Interventions   Physical Therapy Goal     PT, PT/OT Ongoing, Progressing     Description: PT goals until 6/3/22    1. Pt sit to stand with RW with supervision-not met  2. Pt to perform gait 200ft with RW with supervision.-not met  3. Pt to go up/down curb step with RW with SBA.-not met  4. Pt to up/down 12 steps with R UE rail with no AD with CGA.-not met  5. Pt to perform B LE exs in sitting or supine x 15 reps to strengthen B LE to improve functional mobility.-not met  6. Pt to propel w/c 100ft with B UE on level surface with mod independent-not  met  7. Pt to  object off the floor with reacher with RW support with set up assist-not met  8. Pt to ambulated 10ft over uneven surface with Rw with supervision-not met                     Time Tracking:     PT Received On: 05/21/22  PT Total Time (min): 56 min     Billable Minutes: Gait Training 30 and Therapeutic Exercise 26    Treatment Type: Treatment  PT/PTA: PT     PTA Visit Number: 0     05/21/2022

## 2022-05-21 NOTE — NURSING
Pt c/o of rectal pain. Rectum is slightly red and appears to be hemorrhoidal. Notified on call NP, Justina, for further advisement. Awaiting response.

## 2022-05-22 NOTE — PLAN OF CARE
Problem: Occupational Therapy  Goal: Occupational Therapy Goal  Description: Goals to be met by: 6/3/22    Patient will increase functional independence with ADLs by performing:    UE Dressing with [place].  LE Dressing with Modified [place].  Grooming while standing at sink with Supervision.  Toileting from toilet with Supervision Assistance for hygiene and clothing management.   Bathing from  shower chair/bench with Supervision.  Step transfer with Supervision using RW.   Toilet transfer to toilet with Supervision.  Upper extremity exercise program with supervision.        Outcome: Ongoing, Progressing

## 2022-05-22 NOTE — H&P
"[facility] Medicine  Skilled Nursing Facility   History and Physical Exam      Date of Service: 05/22/2022      Patient Name: Susan Puente  MRN: [id number]  Admission Date: 5/19/2022  Attending Physician: Max Reid MD  Primary Care Provider: Claudia Rapp MD  Code Status: Full Code      Principal problem:  Acute respiratory failure with hypoxia      Chief Complaint:   Chief Complaint   Patient presents with    Shortness of Breath     Admitted to OS for rehabilitation following hospital stay for respiratory failure and myelodysplastic syndrome.           HPI:   "Ms. Puente is a 71- year old patient with PMHx of MDS on treatment with Vidaza (currently receiving decitabine due to national Vidaza shortage; Dr. Valdes pt),  DM2, HTN, CAD, anxiety, and depression  Who presents to SNF following hospitalization for acute respiratory failure with hypoxia.     Per  Chart review, "She was admitted 4/23/22 to 4/27/22 with CAP. She was discharged on LVQ, which she will complete on 4/30. She was discharged 4/27/22 with home health and home oxygen. She presented to the ED on 4/29 with complaint of bilat foot pain making it difficult to ambulate at home. Redness noted to bilat great toes. X-ray showing soft tissue edema suggestive of gout to right foot and bunion to left foot. She has a history of gout per patient. She will be admitted to BMT service under observation status. PT/OT will be consulted for eval of SNF appropriateness. She reports that her breathing is significantly improved. Ms. Puente was admitted inpatient on April 29th, shortly after being discharged from the hospital, with an acute gout flare and worsening respiratory status and fever, concerning for pneumonia and acute hypoxic respiratory failure. She was initiated on colchicine and allopurinol with improvement in gout flare. Unfortunately her prolonged hospital course was complicated by worsening respiratory status, requiring supplemental " "O2 and ongoing IV antibiotics. Bcx remained negative, but imaging revealed worsening RML consolidation. Ultimately, pulmonary was consulted and the patient underwent on 5/11 that was non-revealing of infectious source/malignancy, but did revealed inflammation. The patient was started on a steroid taper with some moderate improvement in her respiratory status and ability to participate with therapy teams. Additional complications this hospitalization included poor PO intake/appetite, which have also improved with steroids and the addition of remeron; and resistant hypertension, which required changes to her anti-HTN regimen and an addition of nifidepine XL. Throughout stay, given her physical deconditioning, PT/OT continued to recommend SNF post-discharge. She was medically ready for discharge for several days prior to SNF bed availability and ultimately d/c'd to SNF on 5/19 in improved condition."     Today, patient reports chronic pain to her bilateral shoulders.  Patient is currently utilizing supplemental O2 at 2 L nasal cannula and states that her respiratory status has greatly improved.  Patient is still experiencing mild gout symptoms to her right foot." per Fidelina Maxwell NP on 5/20/22.     She is complaining of rectal pain due to a hemorrhoid and constipation which is getting better now. She says that her appetite is improving and denies any nausea or vomiting. She has chronic right > left shoulder pain. She is also having some shortness of breath with exertion which is slowly improving since her hospital stay. She takes melatonin every night at home and is asking for it to be scheduled instead of PRN. She also would like assistance with getting a Freestyle brand glucometer on discharge and is willing to pay out of pocket. The one that her insurance has covered for her did not work well or consistently.     Patient admitted with skilled services with PT and OT to improve functional status and ability to " perform ADLs.       Past Medical History:   Past Medical History:   Diagnosis Date    Allergic rhinitis     Allergy     Anemia     Anxiety     CAD (coronary artery disease)     Carotid stenosis     Colon polyps 2016    Controlled type 2 diabetes mellitus without complication, without long-term current use of insulin 10/19/2016    Depression     GERD (gastroesophageal reflux disease)     Hearing loss in right ear     Hx of psychiatric care     Celexespinoza Prozac    Hypokalemia 2/3/2020    MDS (myelodysplastic syndrome) with 5q deletion     Psychiatric problem     Therapy        Past Surgical History:   Past Surgical History:   Procedure Laterality Date    BONE MARROW BIOPSY Left 6/7/2018    Procedure: Biopsy-bone marrow;  Surgeon: Gita Valdes MD;  [facility];  Service: Oncology;  Laterality: Left;    BONE MARROW BIOPSY Left 3/21/2019    Procedure: Biopsy-bone marrow;  Surgeon: Gita Valdes MD;  [facility];  Service: Oncology;  Laterality: Left;    BONE MARROW BIOPSY Left 10/24/2019    Procedure: Biopsy-bone marrow;  Surgeon: Gita Valdes MD;  [facility];  Service: Oncology;  Laterality: Left;  left iliac crest    BONE MARROW BIOPSY Left 4/21/2021    Procedure: Biopsy-bone marrow;  Surgeon: Gita Valdes MD;  Location: [facility] ENDO (4TH FLR);  Service: Oncology;  Laterality: Left;    BUNIONECTOMY      CAROTID ENDARTERECTOMY Left 2011    CAROTID STENT      COLONOSCOPY N/A 12/20/2016    Procedure: COLONOSCOPY;  Surgeon: PATRICIA Simpson MD;  Location: [facility] ENDO (4TH FLR);  Service: Endoscopy;  Laterality: N/A;  pt has vomiting with anesthesia in past    ENDOMETRIAL ABLATION      for endometriosis    INSERTION OF TUNNELED CENTRAL VENOUS CATHETER (CVC) WITH SUBCUTANEOUS PORT N/A 2/19/2020    Procedure: [id number];  Surgeon: Dosc Diagnostic Provider;  [facility];  Service: Radiology;  Laterality: N/A;  189    TONSILLECTOMY       TYMPANOSTOMY TUBE PLACEMENT         Social History:   Tobacco Use    Smoking status: Former Smoker     Packs/day: 1.50     Years: 50.00     Pack years: 75.00     Types: Cigarettes, Vaping with nicotine     Quit date: 3/3/2017     Years since quittin.2    Smokeless tobacco: Never Used   Substance and Sexual Activity    Alcohol use: No    Drug use: No    Sexual activity: Not on file       Family History:   Family History     Problem Relation (Age of Onset)    Alcohol abuse Father, Brother    Cancer Paternal Grandmother    Heart disease Mother, Father    Hyperlipidemia Mother          No current facility-administered medications on file prior to encounter.     Current Outpatient Medications on File Prior to Encounter   Medication Sig    acetaminophen (TYLENOL) 650 MG TbSR Take 1,300 mg by mouth daily as needed (Headache).    acyclovir (ZOVIRAX) 400 MG tablet Take 1 tablet (400 mg total) by mouth 2 (two) times daily.    allopurinoL (ZYLOPRIM) 100 MG tablet Take 1 tablet (100 mg total) by mouth once daily.    aspirin (ECOTRIN) 81 MG EC tablet Take 1 tablet (81 mg total) by mouth once daily.    azacitidine (VIDAZA INJ) Inject as directed every 28 days.    carvediloL (COREG) 25 MG tablet Take 1 tablet (25 mg total) by mouth 2 (two) times daily with meals.    chlorthalidone (HYGROTEN) 25 MG Tab Take 1 tablet (25 mg total) by mouth once daily.    citalopram (CELEXA) 20 MG tablet Take 1 tablet (20 mg total) by mouth once daily.    dapsone 100 MG Tab Take 1 tablet (100 mg total) by mouth once daily.    deferasirox (EXJADE) 500 MG disintegrating tablet Take 3 tablets (1,500 mg total) by mouth before breakfast.    evolocumab (REPATHA SURECLICK) 140 mg/mL PnIj Inject 140mg (1 pen) into the skin every 2 weeks. (Patient taking differently: Inject 140mg (1 pen) into the skin every 2 weeks.)    fluconazole (DIFLUCAN) 200 MG Tab Take 2 tablets (400 mg total) by mouth once daily.    ketotifen (ZADITOR) 0.025 %  (0.035 %) ophthalmic solution Place 2-3 drops into both eyes daily as needed (Allergies).    lactose-reduced food (ENSURE ORAL) Take by mouth daily as needed (supplement).    lancets 30 gauge Misc Use to test blood sugar daily (Patient taking differently: Use to test blood sugar daily)    lisinopriL (PRINIVIL,ZESTRIL) 40 MG tablet Take 1 tablet (40 mg total) by mouth once daily.    loperamide (IMODIUM) 2 mg capsule Take 4 mg by mouth daily as needed for Diarrhea.    loratadine (CLARITIN) 10 mg tablet Take 10 mg by mouth daily as needed.     magnesium oxide (MAG-OX) 400 mg (241.3 mg magnesium) tablet Take 2 tablets (800 mg total) by mouth 2 (two) times daily.    melatonin (MELATIN) 5 mg Take 10 mg by mouth every evening.    mirtazapine (REMERON) 7.5 MG Tab Take 1 tablet (7.5 mg total) by mouth every evening.    MULTIVITAMIN ORAL Take 1 tablet by mouth once daily.    NIFEdipine (PROCARDIA-XL) 60 MG (OSM) 24 hr tablet Take 1 tablet (60 mg total) by mouth once daily.    ondansetron (ZOFRAN-ODT) 8 MG TbDL Dissolve 1 tablet (8 mg total) by mouth every 8 (eight) hours as needed.    pantoprazole (PROTONIX) 40 MG tablet Take 1 tablet (40 mg total) by mouth once daily.    polyethylene glycol (MIRALAX) 17 gram PwPk Take 17 g by mouth daily as needed (Constipation).    potassium chloride SA (K-DUR,KLOR-CON) 20 MEQ tablet Take 1 tablet (20 mEq total) by mouth once daily.    predniSONE (DELTASONE) 10 MG tablet Take 6 tabs by mouth once daily for 8 days, THEN 5 tabs once daily for 14 days, 4 tabs daily for 14 days, 3 tabs daily for 14 days, 2 tabs daily for 14 days, 1 tab once daily for 14 days, 0.5 tab once daily for 7 days.    [DISCONTINUED] lisinopriL-hydrochlorothiazide (PRINZIDE,ZESTORETIC) 20-12.5 mg per tablet Take 2 tablets by mouth once daily.       Allergies:   Review of patient's allergies indicates:   Allergen Reactions    Revlimid [lenalidomide] Swelling     Facial swelling  6/8/17 - in the process of  desensitation       ROS:  Review of Systems   Constitutional: Negative for appetite change, chills and fever.   HENT: Negative for congestion and sore throat.    Eyes: Negative for redness and visual disturbance.   Respiratory: Positive for shortness of breath (with exertion). Negative for cough.    Cardiovascular: Negative for chest pain and palpitations.   Gastrointestinal: Positive for blood in stool (due to hemorrhoid), constipation and rectal pain. Negative for abdominal pain, nausea and vomiting.   Genitourinary: Negative for difficulty urinating and dysuria.   Musculoskeletal: Positive for arthralgias. Negative for back pain.   Skin: Negative for pallor and rash.   Allergic/Immunologic: Negative for environmental allergies and food allergies.   Neurological: Positive for weakness. Negative for dizziness and light-headedness.   Hematological: Does not bruise/bleed easily.   Psychiatric/Behavioral: Positive for sleep disturbance. The patient is not nervous/anxious.        Objective:  Temp:  [97.5 °F (36.4 °C)]   Pulse:  [62-72]   Resp:  [18-20]   BP: (111-137)/(53-63)   SpO2:  [95 %-96 %]     Body mass index is 24.32 kg/m².      Physical Exam  Vitals and nursing note reviewed.   Constitutional:       General: She is not in acute distress.     Appearance: She is well-developed.      Interventions: Nasal cannula in place.   HENT:      Head: Normocephalic and atraumatic.      Right Ear: External ear normal.      Left Ear: External ear normal.      Nose: No nasal deformity or mucosal edema.      Mouth/Throat:      Mouth: Mucous membranes are moist.      Pharynx: Uvula midline. No oropharyngeal exudate.   Eyes:      General: No scleral icterus.     Conjunctiva/sclera: Conjunctivae normal.      Pupils: Pupils are equal, round, and reactive to light.   Neck:      Trachea: Phonation normal.   Cardiovascular:      Rate and Rhythm: Normal rate and regular rhythm.      Heart sounds: S1 normal and S2 normal. No murmur  heard.  Pulmonary:      Effort: Pulmonary effort is normal. No respiratory distress.      Breath sounds: Normal breath sounds. No wheezing or rales.   Chest:   Breasts:      Right: No supraclavicular adenopathy.      Left: No supraclavicular adenopathy.       Abdominal:      General: Bowel sounds are normal. There is no distension.      Palpations: Abdomen is soft.      Tenderness: There is no abdominal tenderness. There is no guarding or rebound.   Musculoskeletal:      Right shoulder: Tenderness (mild) present.      Cervical back: Neck supple. No muscular tenderness.      Right lower leg: No edema.      Left lower leg: No edema.   Lymphadenopathy:      Cervical:      Right cervical: No superficial cervical adenopathy.     Left cervical: No superficial cervical adenopathy.      Upper Body:      Right upper body: No supraclavicular adenopathy.      Left upper body: No supraclavicular adenopathy.   Skin:     General: Skin is warm and dry.      Findings: Bruising present. No rash.   Neurological:      Mental Status: She is alert and oriented to person, place, and time.      Motor: No tremor or seizure activity.   Psychiatric:         Mood and Affect: Mood normal.         Behavior: Behavior normal. Behavior is cooperative.         Thought Content: Thought content normal.       Significant Labs:   A1C:   Recent Labs   Lab 02/15/22  0357 05/20/22  1028   HGBA1C 6.1* 5.8*     BMP  Lab Results   Component Value Date     05/19/2022    K 3.8 05/19/2022     05/19/2022    CO2 32 (H) 05/19/2022    BUN 41 (H) 05/19/2022    CREATININE 0.7 05/19/2022    CALCIUM 9.0 05/19/2022    ANIONGAP 8 05/19/2022    ESTGFRAFRICA >60.0 05/19/2022    EGFRNONAA >60.0 05/19/2022     Lab Results   Component Value Date    WBC 5.87 05/19/2022    HGB 8.4 (L) 05/19/2022    HCT 27.2 (L) 05/19/2022    MCV 94 05/19/2022     (L) 05/19/2022         Significant Imaging: I have reviewed all pertinent imaging results/findings completed  during prior hospitalization.      Assessment and Plan:  Active Diagnoses:    Diagnosis Date Noted POA    PRINCIPAL PROBLEM:  Acute respiratory failure with hypoxia [J96.01] 04/26/2022 Yes    Physical debility [R53.81] 04/29/2022 Yes    Acute gout of foot [M10.9] 04/29/2022 Yes    Pancytopenia due to antineoplastic chemotherapy [D61.810, T45.1X5A] 04/26/2022 Yes    Moderate malnutrition [E44.0] 04/24/2022 Yes    Controlled type 2 diabetes mellitus without complication, without long-term current use of insulin [E11.9] 07/17/2021 Yes    Insomnia [G47.00] 01/06/2020 Yes    MDS (myelodysplastic syndrome) [D46.9] 06/07/2018 Yes    Adjustment disorder with mixed anxiety and depressed mood [F43.23] 03/05/2018 Yes    MDS (myelodysplastic syndrome) with 5q deletion [D46.C] 06/08/2017 Yes    Essential hypertension [I10] 12/01/2016 Yes    CAD (coronary artery disease) [I25.10]  Yes      Problems Resolved During this Admission:       Acute respiratory failure with hypoxia  - Patient admitted with acute hypoxic respiratory failure, she was treated for PNA during recent admission and was initiated on home oxygen after 6 min walk test previous admission.  - Completed antibiotics during hospital stay.   - Continue scheduled DuoNebs  - 5/9: Pulmonary consulted given increasing O2 requirements.   - S/p Bronch 5/11 AM. AFB/KOH staining was negative. Gram stain negative. Remaining cultures/studies NGTD. Bronch cytology with macrophages and inflammation.  - Continue Prednisone taper 60mg x 2wks, then taper down by 10mg every two weeks until complete.  - Given prolonged steroid taper, will continue GI ppx and dapsone for PJP ppx  - Follow up with Pulmonary as outpatient      Acute gout of foot  - patient reports difficulty bearing weight on bilat feet due to pain, improving  - x-ray showing soft tissue edema suggestive of gout flare  - uric acid wnl  - allopurinol 100 mg daily for prevention  - Prednisone taper also to  treat     MDS (myelodysplastic syndrome)  - Primary oncologist, Dr. Gita Valdes  [place] [facility] [place] Initially with 5 q minus syndrome and treated with Revlimid. Developed allergy and was then desensitized. Soon after developed pancytopenia and Revlimid was stopped June 2017.    - BMBX on 6/8/17 was with normal cytogenetics. 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 Of 20 metaphases, 5 were normal and 15 had a 5q deletion, NGS positive for SF3B1 and TP53 (12%). 1.4% blasts  - Received vidaza for 22 cycles, received Decitabine for C23 due to national shortage of vidaza and then back to vidaza with C24.  - completed cycle 39 on 4/8/2  - due for cycle 40 on 05/02/2022, postponed whiled admitted and to be readdressed as outpatient      Essential hypertension  - At home regimen: lisinopril/hctz and coreg  - [place] [facility] [place] who recommended switching HCTZ to Chlorthalidone 25mg daily and adding Nifedipine XL 60mg with outpatient follow up. Nifedipine was held d/t hypotension and to allow for some permissible hypertension given clinical status  -  Continue current regimen: Lisinopril 40mg daily, Coreg 25mg BID, Chlorthalidone 25mg daily. Nifedipine XL 60mg  - Will need Cardiology f/u outpatient     Pancytopenia due to antineoplastic chemotherapy  - Transfuse for hgb < 7 or plts < 10  - Continue acyclovir ppx  - Cefepime and fluconazole d/c'd 5/12 given normal WBC and afebrile   -  Monitor twice weekly CBCs     Controlled type 2  diabetes mellitus without complication, without long-term current use of insulin  - Will hold home po diabetic medications  -  Accu-Cheks AC/HS, diabetic diet  - Goal bg 140-180  - Continue on low-dose sliding scale insulin  - Will attempt to get Freestyle brand meter and strips on discharge for her.      Insomnia  - Continue nightly melatonin 9 mg qHS, remeron 7.5 mg qHS.       Adjustment disorder with mixed anxiety and depressed mood  - continue home celexa 20 mg daily     CAD (coronary artery disease)  - S/P left carotid endarterectomy prior to cancer dx, s/p PCI (3 stents) >20 years ago   - Continue on ASA 81mg daily, platelets are wnl  - On repatha as outpatient (statin intolerant)     Debility   - Continue with PT/OT for gait training and strengthening and restoration of ADL's   - Encourage mobility, OOB in chair, and early ambulation as appropriate  - Fall precautions   - Monitor for bowel and bladder dysfunction  - Monitor for and prevent skin breakdown and pressure ulcers  - Continue DVT prophylaxis with  frequent ambulation      Anticipated Disposition:  Home with home health      Future Appointments   Date Time Provider Department Center   5/30/2022 10:30 AM [facility] LAB BMT [facility]   5/31/2022 11:00 AM [facility]SE 11, [facility]   6/2/2022 12:30 PM [facility] LAB [facility]MT [facility]   6/2/2022  1:30 PM Gita Valdes MD [facility] BMT [facility]   6/3/2022 11:00 AM NURSE 11, [facility] CHEMO [facility]       I certify that SNF services are required to be given on an inpatient basis because Susan Puente needs for skilled nursing care and/or skilled rehabilitation are required on a daily basis and such services can only practically be provided in a skilled nursing facility setting and are for an ongoing condition for which she received inpatient care in the hospital.       Max Reid MD  Department of Hospital Medicine   [facility] - Skilled Nursing

## 2022-05-22 NOTE — PLAN OF CARE
Problem: Adult Inpatient Plan of Care  Goal: Plan of Care Review  Outcome: Ongoing, Progressing  Goal: Patient-Specific Goal (Individualized)  Outcome: Ongoing, Progressing  Goal: Absence of Hospital-Acquired Illness or Injury  Outcome: Ongoing, Progressing  Goal: Readiness for Transition of Care  Outcome: Ongoing, Progressing     Problem: Diabetes Comorbidity  Goal: Blood Glucose Level Within Targeted Range  Outcome: Ongoing, Progressing     Problem: Fall Injury Risk  Goal: Absence of Fall and Fall-Related Injury  Outcome: Ongoing, Progressing      COVID Monoclonal Antibody

## 2022-05-23 NOTE — PT/OT/SLP PROGRESS
"Physical Therapy Treatment    Patient Name:  Susan Puente   MRN:  [id number]  Admit Date: 5/19/2022  Admitting Diagnosis: Acute respiratory failure with hypoxia    General Precautions: Standard, fall   Orthopedic Precautions:N/A       Recommendations:     Discharge Recommendations:  home with home health   Level of Assistance Recommended at Discharge: Intermittent assistance   Discharge Equipment Recommendations: bedside commode, walker, rolling   Barriers to discharge: Inaccessible home environment, Decreased caregiver support    Assessment:     Susan Puente is a 71 y.o. female admitted with a medical diagnosis of Acute respiratory failure with hypoxia. Pt participated, and tolerated treatment well. Pt will continue to benefit from skilled PT services to improve all deficits noted below. Resume PT POC as indicated.       Performance deficits affecting function:  weakness, impaired endurance, impaired self care skills, impaired functional mobilty, gait instability, impaired balance, impaired cardiopulmonary response to activity .    Rehab Potential is good    Activity Tolerance: Good    Plan:     Patient to be seen 6 x/week to address the above listed problems via gait training, therapeutic activities, therapeutic exercises, neuromuscular re-education, wheelchair management/training    · Plan of Care Expires: 06/19/22  · Plan of Care Reviewed with: patient    Subjective     "I should go potty first."     Pain/Comfort:  · Pain Rating 1: 0/10  · Pain Rating Post-Intervention 1: 0/10    Patient's cultural, spiritual, Confucianism conflicts given the current situation:  · no    Objective:     Communicated with nursing prior to session.  Patient found up in chair with oxygen upon PT entry to room.     Therapeutic Activities and Exercises:   -Recumbent stepper x15 min L2   -BLE therex x10 reps: LAQ,HF,Hip abd/add  -Pt required assistance with pulling up pants post tolieting.     Functional " Mobility:  · Transfers:  Sit to Stand:  stand by assistance with rolling walker  · Toilet Transfer: contact guard assistance with  rolling walker  using  Step Transfer  · Gait: 110ft with RW and CGA with reciprocal gait pattern with slow vinnie.   · Wheelchair Propulsion:  Pt propelled Standard wheelchair x 150 feet on Level tile with  Bilateral upper extremity with Supervision or Set-up Assistance.     AM-PAC 6 CLICK MOBILITY  18    Patient left up in chair with all lines intact, call button in reach and nursing notified.    GOALS:   Multidisciplinary Problems     Physical Therapy Goals        Problem: Physical Therapy    Goal Priority Disciplines Outcome Goal Variances Interventions   Physical Therapy Goal     PT, PT/OT Ongoing, Progressing     Description: PT goals until 6/3/22    1. Pt sit to stand with RW with supervision-not met  2. Pt to perform gait 200ft with RW with supervision.-not met  3. Pt to go up/down curb step with RW with SBA.-not met  4. Pt to up/down 12 steps with R UE rail with no AD with CGA.-not met  5. Pt to perform B LE exs in sitting or supine x 15 reps to strengthen B LE to improve functional mobility.-not met  6. Pt to propel w/c 100ft with B UE on level surface with mod independent-not met  7. Pt to  object off the floor with reacher with RW support with set up assist-not met  8. Pt to ambulated 10ft over uneven surface with Rw with supervision-not met                     Time Tracking:     PT Received On: 05/23/22  PT Total Time (min):  53 minutes      Billable Minutes: Gait Training 10, Therapeutic Activity 23 and Therapeutic Exercise 20    Treatment Type: Treatment  PT/PTA: PTA     PTA Visit Number: 1     05/23/2022

## 2022-05-23 NOTE — PLAN OF CARE
Problem: Adult Inpatient Plan of Care  Goal: Plan of Care Review  Outcome: Ongoing, Progressing  Goal: Patient-Specific Goal (Individualized)  Outcome: Ongoing, Progressing  Goal: Absence of Hospital-Acquired Illness or Injury  Outcome: Ongoing, Progressing  Goal: Optimal Comfort and Wellbeing  Outcome: Ongoing, Progressing  Goal: Readiness for Transition of Care  Outcome: Ongoing, Progressing     Problem: Diabetes Comorbidity  Goal: Blood Glucose Level Within Targeted Range  Outcome: Ongoing, Progressing     Problem: Adjustment to Illness (Sepsis/Septic Shock)  Goal: Optimal Coping  Outcome: Ongoing, Progressing     Problem: Bleeding (Sepsis/Septic Shock)  Goal: Absence of Bleeding  Outcome: Ongoing, Progressing     Problem: Glycemic Control Impaired (Sepsis/Septic Shock)  Goal: Blood Glucose Level Within Desired Range  Outcome: Ongoing, Progressing     Problem: Infection Progression (Sepsis/Septic Shock)  Goal: Absence of Infection Signs and Symptoms  Outcome: Ongoing, Progressing     Problem: Nutrition Impaired (Sepsis/Septic Shock)  Goal: Optimal Nutrition Intake  Outcome: Ongoing, Progressing     Problem: Fluid and Electrolyte Imbalance (Acute Kidney Injury/Impairment)  Goal: Fluid and Electrolyte Balance  Outcome: Ongoing, Progressing     Problem: Oral Intake Inadequate (Acute Kidney Injury/Impairment)  Goal: Optimal Nutrition Intake  Outcome: Ongoing, Progressing     Problem: Renal Function Impairment (Acute Kidney Injury/Impairment)  Goal: Effective Renal Function  Outcome: Ongoing, Progressing     Problem: Fluid Imbalance (Pneumonia)  Goal: Fluid Balance  Outcome: Ongoing, Progressing     Problem: Infection (Pneumonia)  Goal: Resolution of Infection Signs and Symptoms  Outcome: Ongoing, Progressing     Problem: Respiratory Compromise (Pneumonia)  Goal: Effective Oxygenation and Ventilation  Outcome: Ongoing, Progressing     Problem: Infection  Goal: Absence of Infection Signs and Symptoms  Outcome:  Ongoing, Progressing     Problem: Impaired Wound Healing  Goal: Optimal Wound Healing  Outcome: Ongoing, Progressing     Problem: Fall Injury Risk  Goal: Absence of Fall and Fall-Related Injury  Outcome: Ongoing, Progressing     Problem: Skin Injury Risk Increased  Goal: Skin Health and Integrity  Outcome: Ongoing, Progressing     Problem: Malnutrition  Goal: Improved Nutritional Intake  Outcome: Ongoing, Progressing

## 2022-05-23 NOTE — PLAN OF CARE
SW met with patient in room for Discharge Planning Assessment. Pt lives alone and states s/he was previously using a rollator or nothing for ambulation. Patient will have transportation assistance at discharge from her POA, Sameer Frey. Patient will have limited assistance after discharge.  SW is following this Pt for DC planning needs.     Patient's preferred pharmacy is [facility].    Patient reports having Rollator and TTB.      Jana Castro, [facility]  Case Management Department  [facility]  [email or web address]  Initial Discharge Assessment       Primary Care Provider: Claudia Rapp MD    Admission Diagnosis: Acute respiratory failure with hypoxia [J96.01]    Admission Date: 5/19/2022  Expected Discharge Date:     Discharge Barriers Identified: No family/friends to help    Payor: HU[employer] MANAGED MEDICARE / Plan: HU[employer] MEDICARE HMO / Product Type: Capitation /     Extended Emergency Contact Information  Primary Emergency Contact: Jacy Nava   [facility]  Home Phone: [phone]  Mobile Phone: [phone]  Relation: Friend  Secondary Emergency Contact: Tammy Huggins   [place]  Mobile Phone: [phone]  Relation: Friend    Discharge Plan A: Home, Home Health  Discharge Plan B: Home, Home Health      [facility]  [address] [address]  Phone: [phone] Fax: [phone]    [facility]  [address] [facility] [address]  Phone: [phone] Fax: [phone]      Initial Assessment (most recent)     Adult Discharge Assessment - 05/20/22 2110        Discharge Assessment    Assessment Type Discharge Planning Assessment     Source of Information patient     Lives With alone     Do you expect to return to your current living situation? Yes     Do you have help at home or someone to help you manage your care at home? No     Prior to hospitilization  cognitive status: Alert/Oriented     Current cognitive status: Alert/Oriented     Home Accessibility stairs to enter home     Home Layout Bathroom on 2nd floor;Bedroom on 2nd floor     Equipment Currently Used at Home rollator;bath bench     Readmission within 30 days? No     Patient currently being followed by outpatient case management? No     Do you currently have service(s) that help you manage your care at home? No     Do you take prescription medications? Yes     Do you have prescription coverage? Yes     Do you have any problems affording any of your prescribed medications? No     Is the patient taking medications as prescribed? yes     Who is going to help you get home at discharge? Sameer Frey [phone]     How do you get to doctors appointments? car, drives self;family or friend will provide     Are you on dialysis? No     Do you take coumadin? No     Discharge Plan A Home;Home Health     Discharge Plan B Home;Home Health     DME Needed Upon Discharge  other (see comments)   TBD    Discharge Plan discussed with: Patient     Discharge Barriers Identified No family/friends to help

## 2022-05-23 NOTE — PLAN OF CARE
Problem: Occupational Therapy  Goal: Occupational Therapy Goal  Description: Goals to be met by: 6/3/22    Patient will increase functional independence with ADLs by performing:    UE Dressing with [place].  LE Dressing with Modified [place].  Grooming while standing at sink with Supervision.  Toileting from toilet with Supervision Assistance for hygiene and clothing management.   Bathing from  shower chair/bench with Supervision.  Step transfer with Supervision using RW.   Toilet transfer to toilet with Supervision.  Upper extremity exercise program with supervision.        Outcome: Ongoing, Progressing

## 2022-05-23 NOTE — PROGRESS NOTES
"                                                        [facility]                                  Skilled Nursing Facility                   Progress Note     Admit Date: 5/19/2022  SHABBIR 6/6/2022  Principal Problem:  Acute respiratory failure with hypoxia   HPI obtained from patient interview and chart review     Chief Complaint: Establish Care/ Initial Visit     HPI:    Ms. Puente is a 71- year old patient with PMHx of MDS on treatment with Vidaza (currently receiving decitabine due to national Vidaza shortage; Dr. Valdes pt),  DM2, HTN, CAD, anxiety, and depression  Who presents to SNF following hospitalization for acute respiratory failure with hypoxia.    Interval history: All of today's labs reviewed and are listed below.  BUN increased to 68 from 41. AST/ALT remain 51/112 from 47/93.  24 hr vital sign ranges listed below.  Supplemental oxygen removed from patient during my assessment, SpO2 maintained 94-97%, oxygen left off to continue weaning process, nurse notified.  Patient states her shortness of breath has improved, no longer hearing crackle to left lower lobe.  Patient states her gout pain is much improved.  Patient reports an adequate appetite.  Patient enies dysuria.  Patient reports having regular bowel movements.  Patient progessing with PT/OT -Gait: 110ft with RW and CGA with reciprocal gait pattern with slow vinnie. Continuing to follow and treat all acute and chronic conditions.    Message sent to Oncology team to inquire: 1) is pt suppose to be placed back on Cipro? not ordered for this SNF admission but note from Dr. Valdes on 4/4 states she should be on it along with previous DC summary from 4/27.   Dr Valdes note 4/4/22 "- Continue ppx acyclovir, cipro, fluconazole (only taking 200mg due to previously elevated LFTs). discussed stopping ppx antibiotic and antifungal given ANC >500 for at least past 2 months. Patient wishes to continue these for now. 2) is pt suppose to on be " fluconazole 200 or 400mg? 400 ordered for SNF but based on Dr Valdes's note from 4/4 should be 200 due to elevated LFTs- that are still high. 3) should pt still be on Deferasirox for iron reduction? was a new med after DC on 4/27/22      Past Medical History: Patient has a past medical history of Allergic rhinitis, Allergy, Anemia, Anxiety, CAD (coronary artery disease), Carotid stenosis, Colon polyps (2016), Controlled type 2 diabetes mellitus without complication, without long-term current use of insulin (10/19/2016), Depression, GERD (gastroesophageal reflux disease), Hearing loss in right ear, psychiatric care, Hypokalemia (2/3/2020), MDS (myelodysplastic syndrome) with 5q deletion, Psychiatric problem, and Therapy.    Past Surgical History: Patient has a past surgical history that includes Tonsillectomy; Carotid stent; Carotid endarterectomy (Left, 2011); Bunionectomy; Endometrial ablation; Colonoscopy (N/A, 12/20/2016); Tympanostomy tube placement; Bone marrow biopsy (Left, 6/7/2018); Bone marrow biopsy (Left, 3/21/2019); Bone marrow biopsy (Left, 10/24/2019); Insertion of tunneled central venous catheter (CVC) with subcutaneous port (N/A, 2/19/2020); and Bone marrow biopsy (Left, 4/21/2021).    Social History: Patient reports that she quit smoking about 5 years ago. Her smoking use included cigarettes and vaping with nicotine. She has a 75.00 pack-year smoking history. She has never used smokeless tobacco. She reports that she does not drink alcohol and does not use drugs.    Family History: family history includes Alcohol abuse in her brother and father; Cancer in her paternal grandmother; Heart disease in her father and mother; Hyperlipidemia in her mother.    Allergies: Patient is allergic to revlimid [lenalidomide].    ROS  Constitutional: Negative for fever   Eyes: Negative for blurred vision, double vision   Respiratory: Negative for cough, shortness of breath   Cardiovascular: Negative for chest pain,  palpitations, and leg swelling.   Gastrointestinal: Negative for abdominal pain, constipation, diarrhea, nausea, vomiting.   Genitourinary: Negative for dysuria, frequency   Musculoskeletal:  + generalized weakness.  Improving pain to right foot  Skin: Negative for itching and rash.   Neurological: Negative for dizziness, headaches.   Psychiatric/Behavioral: Negative for depression. The patient is not nervous/anxious.      24 hour Vital Sign Range   Temp:  [97.6 °F (36.4 °C)-97.9 °F (36.6 °C)]   Pulse:  [62-67]   Resp:  [18]   BP: (111-117)/(56-57)   SpO2:  [96 %-98 %]     PEx  Constitutional: Patient appears debilitated.  No distress noted  HENT:   Head: Normocephalic and atraumatic.   Eyes: Pupils are equal, round  Neck: Normal range of motion. Neck supple.   Cardiovascular: Normal rate, regular rhythm and normal heart sounds.    Pulmonary/Chest: Effort normal and breath sounds are clear    Abdominal: Soft. Bowel sounds are normal.   Musculoskeletal: Normal range of motion.   Neurological: Alert and oriented to person, place, and time.   Psychiatric: Normal mood and affect. Behavior is normal.   Skin: Skin is warm and dry.  Redness to right foot a great toe    Recent Labs   Lab 05/23/22  0630      K 5.0      CO2 29   BUN 68*   CREATININE 0.8   MG 1.8       Recent Labs   Lab 05/23/22  0630   WBC 10.33   RBC 3.10*   HGB 8.9*   HCT 29.0*      MCV 94   MCH 28.7   MCHC 30.7*         Assessment and Plan:    Acute respiratory failure with hypoxia  - Patient admitted with acute hypoxic respiratory failure, she was treated for PNA during recent admission and was initiated on home oxygen after 6 min walk test previous admission.  - CXR with pulmonary consolidation and fever on admission, recently completed levaquin/azithro course for CAP.   - Completed Cefepime course.  - Continue scheduled DuoNebs  - 5/9: Pulmonary consulted given increasing O2 requirements.   - S/p Bronch 5/11 AM. AFB/KOH staining was  negative. Gram stain negative. Remaining cultures/studies NGTD. Bronch cytology with macrophages and inflammation.  - Continue Prednisone taper 60mg x 2wks, then taper down by 10mg every two weeks until complete.  - Given prolonged steroid taper, will continue GI ppx and dapsone for PJP ppx  - Will f/u with pulm outpatient   - 5/23 respiratory status improving     Acute gout of foot  - patient reports difficulty bearing weight on bilat feet due to pain, improving  - x-ray showing soft tissue edema suggestive of gout flare  - uric acid wnl  - allopurinol 100 mg daily for prevention  -   Prednisone taper also to treat  - 5/23 pain and redness improving     MDS (myelodysplastic syndrome)  - Primary oncologist, Dr. Gita Valdes  [facility]:  - Initially with 5 q minus syndrome and treated with Revlimid. Developed allergy and was then desensitized. Soon after developed pancytopenia and Revlimid was stopped June 2017.    - BMBX on 6/8/17 was with normal cytogenetics. 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 Of 20 metaphases, 5 were normal and 15 had a 5q deletion, NGS positive for SF3B1 and TP53 (12%). 1.4% blasts  - Received vidaza for 22 cycles, received Decitabine for C23 due to national shortage of vidaza and then back to vidaza with C24.  - completed cycle 39 on 4/8/2  - due for cycle 40 on 05/02/2022, postponed whiled admitted and to be readdressed as outpatient   - 5/23 message sent to Oncology team, see HPI for details     Essential  hypertension  - At home regimen: lisinopril/hctz and coreg  - Per C, Discussed with cardiology who recommended switching HCTZ to Chlorthalidone 25mg daily and adding Nifedipine XL 60mg with outpatient follow up. Nifedipine was held d/t hypotension and to allow for some permissible hypertension given clinical status  -  Continue Current anti-HTN regimen: Lisinopril 40mg daily, Coreg 25mg BID, Chlorthalidone 25mg daily. Nifedipine XL 60mg on  - Will need cardiology f/u outpatient  - 5/23 reduce lisinopril to 20mg daily, BP low to normal      Pancytopenia due to antineoplastic chemotherapy  - Transfuse for hgb < 7 or plts < 10  - Continue acyclovir ppx  - Cefepime and fluconazole d/c'd 5/12 given normal WBC and afebrile   -  Monitor twice weekly CBCs     CAP (community acquired pneumonia)  - Pt with 2 weeks of productive cough, nasal congestion  - Upon arrival to the hospial she was hypoxic requiring 3L NC, weaned to 2L today  - CXR with likely develop consolidation  - CTA shows New patchy consolidative opacities in the in the right upper and posterior right lower lobes.  Findings suggestive infectious etiology such as pneumonia, multifocal pneumonia.  Also consider atypical given patient's reported immunocompromised status.  Aspiration could present similarly.  Consider continued follow-up after acute clinical illness has resolved to ensure resolution. New mediastinal and bilateral hilar lymphadenopathy, likely reactive.  - completed azithromycin and 7 day course of Levaquin on 4/30.  - CXR on admission showing possible developing consolidation: Cefepime 4/30-5/3    - See acute respiratory failure with hypoxia above     Controlled type 2 diabetes mellitus without complication, without long-term current use of insulin  - Will hold home po diabetic medications  -   Accu-Cheks AC/HS, diabetic diet  - Goal bg 140-180  -  Continue on low-dose sliding scale insulin    Insomnia  - Continue nightly melatonin 9 mg qHS,  remeron 7.5 mg qHS.       Adjustment disorder with mixed anxiety and depressed mood  - continue home celexa 20 mg daily     CAD (coronary artery disease)  - S/P left carotid endarterectomy prior to cancer dx, s/p PCI (3 stents) >20 years ago   - Continue on ASA 81mg daily, platelets are wnl  - On repatha as outpatient (statin intolerant)    Debility   - Continue with PT/OT for gait training and strengthening and restoration of ADL's   - Encourage mobility, OOB in chair, and early ambulation as appropriate  - Fall precautions   - Monitor for bowel and bladder dysfunction  - Monitor for and prevent skin breakdown and pressure ulcers  - Continue DVT prophylaxis with  frequent ambulation         Anticipate disposition:  Home with home health      Follow-up needed during SNF stay-    Appointments not to send patient to- 5/30, 5/31    Follow-up needed after discharge from [facility]:   - PCP, hospital follow-up, needs to be scheduled  - Oncology, scheduled for after SNF discharge    Future Appointments   Date Time Provider Department Center   5/24/2022  7:00 AM [facility] SPEC LAB [facility] SPE[facility]   5/27/2022  7:00 AM [facility] SPEC LAB [facility]   5/30/2022 10:30 AM [facility]   5/31/2022 11:00 AM NURSE 11, [facility]   6/2/2022 12:30 PM [facility]   6/2/2022  1:30 PM Gita Valdes MD [facility] B[facility]   6/3/2022 11:00 AM NURSE 11, [facility]         Fidelina Maxwell NP  Department of Hospital Medicine   [facility]     DOS: 5/23/2022       Patient note was created using MModal Dictation.  Any errors in syntax or even information may not have been identified and edited on initial review prior to signing this note.

## 2022-05-23 NOTE — CLINICAL REVIEW
Clinical Pharmacy Chart Review Note      Admit Date: 5/19/2022   LOS: 4 days       Susan Puente is a 71 y.o. female admitted to SNF for PT/OT after hospitalization for acute respiratory failure with hypoxia.    Active Hospital Problems    Diagnosis  POA    *Acute respiratory failure with hypoxia [J96.01]  Yes    Physical debility [R53.81]  Yes    Acute gout of foot [M10.9]  Yes    Pancytopenia due to antineoplastic chemotherapy [D61.810, T45.1X5A]  Yes    Moderate malnutrition [E44.0]  Yes    Controlled type 2 diabetes mellitus without complication, without long-term current use of insulin [E11.9]  Yes    Insomnia [G47.00]  Yes    MDS (myelodysplastic syndrome) [D46.9]  Yes    Adjustment disorder with mixed anxiety and depressed mood [F43.23]  Yes    MDS (myelodysplastic syndrome) with 5q deletion [D46.C]  Yes    Essential hypertension [I10]  Yes    CAD (coronary artery disease) [I25.10]  Yes      Resolved Hospital Problems   No resolved problems to display.     Review of patient's allergies indicates:   Allergen Reactions    Revlimid [lenalidomide] Swelling     Facial swelling  6/8/17 - in the process of desensitation     Patient Active Problem List    Diagnosis Date Noted    Acute gout of foot 04/29/2022    Physical debility 04/29/2022    Acute respiratory failure with hypoxia 04/26/2022    Pancytopenia due to antineoplastic chemotherapy 04/26/2022    CAP (community acquired pneumonia) 04/24/2022    Moderate malnutrition 04/24/2022    Severe sepsis 02/15/2022    History of fall 09/27/2021    Controlled type 2 diabetes mellitus without complication, without long-term current use of insulin 07/17/2021    History of carotid endarterectomy 10/22/2020    Hypokalemia 02/03/2020    Insomnia 01/06/2020    Myalgia 01/05/2020    NOEL (acute kidney injury) 08/19/2019    Altered level of consciousness 03/13/2019    MDS (myelodysplastic syndrome) 06/07/2018    Adjustment disorder with mixed  anxiety and depressed mood 03/05/2018    MDS (myelodysplastic syndrome) with 5q deletion 06/08/2017    Myelodysplastic syndrome 01/19/2017    Calcification of aorta 01/16/2017    Essential hypertension 12/01/2016    Anemia requiring transfusions 11/10/2016    Macrocytic anemia 11/10/2016    Hypercalcemia 11/10/2016    CAD (coronary artery disease)     Bilateral carotid artery disease     Hearing loss in right ear        Scheduled Meds:    acyclovir  400 mg Oral BID    allopurinoL  100 mg Oral Daily    aspirin  81 mg Oral Daily    carvediloL  25 mg Oral BID WM    chlorthalidone  25 mg Oral Daily    citalopram  20 mg Oral Daily    dapsone  100 mg Oral Daily    deferasirox  1,500 mg Oral Before breakfast    diclofenac sodium  2 g Topical (Top) BID    fluconazole  400 mg Oral Daily    lisinopriL  40 mg Oral Daily    magnesium oxide  800 mg Oral BID    melatonin  9 mg Oral Nightly    mirtazapine  7.5 mg Oral Nightly    multivitamin  1 tablet Oral Daily    NIFEdipine  60 mg Oral Daily    pantoprazole  40 mg Oral Daily    potassium chloride  20 mEq Oral Daily    [START ON 7/23/2022] predniSONE  10 mg Oral Daily    [START ON 7/9/2022] predniSONE  20 mg Oral Daily    [START ON 6/25/2022] predniSONE  30 mg Oral Daily    [START ON 6/11/2022] predniSONE  40 mg Oral Daily    [START ON 8/6/2022] predniSONE  5 mg Oral Daily    [START ON 5/28/2022] predniSONE  50 mg Oral Daily    predniSONE  60 mg Oral Daily    senna-docusate 8.6-50 mg  1 tablet Oral BID     Continuous Infusions:   PRN Meds: acetaminophen, calcium carbonate, cetirizine, dextrose 10%, dextrose 10%, glucagon (human recombinant), glucose, glucose, insulin aspart U-100, loperamide, ondansetron, phenyleph-min oil-petrolatum, polyethylene glycol    OBJECTIVE:     Vital Signs (Last 24H)  Temp:  [97.6 °F (36.4 °C)-97.9 °F (36.6 °C)]   Pulse:  [62-73]   Resp:  [18]   BP: (111-152)/(56-70)   SpO2:  [96 %-98 %]     Laboratory:  CBC:    Recent Labs   Lab 05/18/22 0414 05/19/22 0457 05/23/22  0630   WBC 5.70 5.87 10.33   RBC 2.83* 2.88* 3.10*   HGB 8.3* 8.4* 8.9*   HCT 26.7* 27.2* 29.0*   * 137* 174   MCV 94 94 94   MCH 29.3 29.2 28.7   MCHC 31.1* 30.9* 30.7*     BMP:   Recent Labs   Lab 05/18/22 0414 05/19/22 0457 05/23/22  0630   * 150* 142*    145 143   K 4.3 3.8 5.0    105 104   CO2 32* 32* 29   BUN 43* 41* 68*   CREATININE 0.8 0.7 0.8   CALCIUM 8.6* 9.0 10.0   MG 1.5* 1.4* 1.8     CMP:   Recent Labs   Lab 05/18/22 0414 05/19/22 0457 05/23/22  0630   * 150* 142*   CALCIUM 8.6* 9.0 10.0   ALBUMIN 2.3* 2.4* 3.0*   PROT 5.6* 5.7* 6.4    145 143   K 4.3 3.8 5.0   CO2 32* 32* 29    105 104   BUN 43* 41* 68*   CREATININE 0.8 0.7 0.8   ALKPHOS 82 76 70   ALT 87* 93* 112*   AST 46* 47* 51*   BILITOT 0.3 0.2 0.3     LFTs:   Recent Labs   Lab 05/18/22 0414 05/19/22 0457 05/23/22  0630   ALT 87* 93* 112*   AST 46* 47* 51*   ALKPHOS 82 76 70   BILITOT 0.3 0.2 0.3   PROT 5.6* 5.7* 6.4   ALBUMIN 2.3* 2.4* 3.0*         ASSESSMENT/PLAN:     I have reviewed the medications in compliance with CMS Regulation F756 of the NASIR. Based on information gathered, the following items may need to be addressed:    **According to PMH and home medication list, patient takes the following medications at home. These medications are not currently ordered at [facility]:  · Ciprofloxacin 500 mg twice daily  · Repatha 140 mg into skin every 2 weeks (non-formulary)    **Patient is taking citalopram (home med) and mirtazapine for adjustment disorder with anxiety and depressed mood and appetite. A gradual dose reduction is not recommended at this time. Patient to follow-up with prescribing MD as outpatient.  Monitor: mental status for depression, suicide ideation, anxiety, serotonin syndrome, hyponatremia, dizziness, drowsiness, somnolence      Medications reviewed by PharmD, please re-consult if needed.      Claudia Cheema, Mary.  GINNA  Clinical Pharmacist  [facility]

## 2022-05-24 NOTE — PROGRESS NOTES
[facility]                                  Skilled Nursing Facility                   Progress Note     Admit Date: 5/19/2022  SHABBIR 6/6/2022  Principal Problem:  Acute respiratory failure with hypoxia   HPI obtained from patient interview and chart review     Chief Complaint: Re-evaluation of medical treatment and therapy status, evaluation of supplemental O2    HPI:    Ms. Puente is a 71- year old patient with PMHx of MDS on treatment with Vidaza (currently receiving decitabine due to national Vidaza shortage; Dr. Valdes pt),  DM2, HTN, CAD, anxiety, and depression  Who presents to SNF following hospitalization for acute respiratory failure with hypoxia.    Interval history:   24 hr vital sign ranges listed below.  Confirmed with oncology team, patient does not need a Cipro or fluconazole at this time, continue on acyclovir.  Weaning efforts continue with supplemental oxygen, patient is currently wearing it and states that was placed on last night, she does not know why, she states she is also wearing it for security reasons, weaning efforts explained to patient again.  Gout pain continues to decrease.  Patient denies shortness of breath, abdominal discomfort, nausea, or vomiting.  Patient reports an adequate appetite.  Patient denies dysuria.  Patient reports having regular bowel movements.  Patient progessing with PT/OT-Gait: x 100', RW, CGA with focus on posture and keeping AD close to pt's body in order to improve pt's safety. Continuing to follow and treat all acute and chronic conditions.      Past Medical History: Patient has a past medical history of Allergic rhinitis, Allergy, Anemia, Anxiety, CAD (coronary artery disease), Carotid stenosis, Colon polyps (2016), Controlled type 2 diabetes mellitus without complication, without long-term current use of insulin (10/19/2016), Depression, GERD (gastroesophageal reflux disease), Hearing  loss in right ear, psychiatric care, Hypokalemia (2/3/2020), MDS (myelodysplastic syndrome) with 5q deletion, Psychiatric problem, and Therapy.    Past Surgical History: Patient has a past surgical history that includes Tonsillectomy; Carotid stent; Carotid endarterectomy (Left, 2011); Bunionectomy; Endometrial ablation; Colonoscopy (N/A, 12/20/2016); Tympanostomy tube placement; Bone marrow biopsy (Left, 6/7/2018); Bone marrow biopsy (Left, 3/21/2019); Bone marrow biopsy (Left, 10/24/2019); Insertion of tunneled central venous catheter (CVC) with subcutaneous port (N/A, 2/19/2020); and Bone marrow biopsy (Left, 4/21/2021).    Social History: Patient reports that she quit smoking about 5 years ago. Her smoking use included cigarettes and vaping with nicotine. She has a 75.00 pack-year smoking history. She has never used smokeless tobacco. She reports that she does not drink alcohol and does not use drugs.    Family History: family history includes Alcohol abuse in her brother and father; Cancer in her paternal grandmother; Heart disease in her father and mother; Hyperlipidemia in her mother.    Allergies: Patient is allergic to revlimid [lenalidomide].    ROS  Constitutional: Negative for fever   Eyes: Negative for blurred vision, double vision   Respiratory: Negative for cough, shortness of breath   Cardiovascular: Negative for chest pain, palpitations, and leg swelling.   Gastrointestinal: Negative for abdominal pain, constipation, diarrhea, nausea, vomiting.   Genitourinary: Negative for dysuria, frequency   Musculoskeletal:  + generalized weakness.  Improving pain to right foot  Skin: Negative for itching and rash.   Neurological: Negative for dizziness, headaches.   Psychiatric/Behavioral: Negative for depression. The patient is not nervous/anxious.      24 hour Vital Sign Range   Temp:  [97.5 °F (36.4 °C)-97.7 °F (36.5 °C)]   Pulse:  [67-76]   Resp:  [18]   BP: (114-130)/(59-71)   SpO2:  [93 %-97 %]      PEx  Constitutional: Patient appears debilitated.  No distress noted  HENT:   Head: Normocephalic and atraumatic.   Eyes: Pupils are equal, round  Neck: Normal range of motion. Neck supple.   Cardiovascular: Normal rate, regular rhythm and normal heart sounds.    Pulmonary/Chest: Effort normal and breath sounds are clear    Abdominal: Soft. Bowel sounds are normal.   Musculoskeletal: Normal range of motion.   Neurological: Alert and oriented to person, place, and time.   Psychiatric: Normal mood and affect. Behavior is normal.   Skin: Skin is warm and dry.  Redness to right foot a great toe, improving    No results for input(s): GLUCOSE, NA, K, CL, CO2, BUN, CREATININE, MG in the last 24 hours.    Invalid input(s):  CALCIUM    No results for input(s): WBC, RBC, HGB, HCT, PLT, MCV, MCH, MCHC in the last 24 hours.      Assessment and Plan:    Acute respiratory failure with hypoxia  - Patient admitted with acute hypoxic respiratory failure, she was treated for PNA during recent admission and was initiated on home oxygen after 6 min walk test previous admission.  - CXR with pulmonary consolidation and fever on admission, recently completed levaquin/azithro course for CAP.   - Completed Cefepime course.  - Continue scheduled DuoNebs  - 5/9: Pulmonary consulted given increasing O2 requirements.   - S/p Bronch 5/11 AM. AFB/KOH staining was negative. Gram stain negative. Remaining cultures/studies NGTD. Bronch cytology with macrophages and inflammation.  - Continue Prednisone taper 60mg x 2wks, then taper down by 10mg every two weeks until complete.  - Given prolonged steroid taper, will continue GI ppx and dapsone for PJP ppx  - Will f/u with pulm outpatient   - 5/24 respiratory status improving, continue weaning efforts     Acute gout of foot  - patient reports difficulty bearing weight on bilat feet due to pain, improving  - x-ray showing soft tissue edema suggestive of gout flare  - uric acid wnl  - allopurinol 100  mg daily for prevention  -   Prednisone taper also to treat  - 5/24 pain and redness improving     MDS (myelodysplastic syndrome)  - Primary oncologist, Dr. Gita Valdes  [facility]:  - Initially with 5 q minus syndrome and treated with Revlimid. Developed allergy and was then desensitized. Soon after developed pancytopenia and Revlimid was stopped June 2017.    - BMBX on 6/8/17 was with normal cytogenetics. 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 Of 20 metaphases, 5 were normal and 15 had a 5q deletion, NGS positive for SF3B1 and TP53 (12%). 1.4% blasts  - Received vidaza for 22 cycles, received Decitabine for C23 due to national shortage of vidaza and then back to vidaza with C24.  - completed cycle 39 on 4/8/2  - due for cycle 40 on 05/02/2022, postponed whiled admitted and to be readdressed as outpatient   - 5/23 message sent to Oncology team, see HPI for details     Essential hypertension  - At home regimen: lisinopril/hctz and coreg  - Per [facility], Discussed with cardiology who recommended switching HCTZ to Chlorthalidone 25mg daily and adding Nifedipine XL 60mg with outpatient follow up. Nifedipine was held d/t hypotension and to allow for some permissible hypertension given clinical status  -  Continue Current anti-HTN regimen: Lisinopril 40mg daily, Coreg 25mg BID, Chlorthalidone 25mg daily. Nifedipine XL 60mg on  - Will need cardiology f/u outpatient  - 5/23 reduce lisinopril to 20mg daily, BP low to normal      Pancytopenia due to  antineoplastic chemotherapy  - Transfuse for hgb < 7 or plts < 10  - Continue acyclovir ppx  - Cefepime and fluconazole d/c'd 5/12 given normal WBC and afebrile   -  Monitor twice weekly CBCs     CAP (community acquired pneumonia)  - Pt with 2 weeks of productive cough, nasal congestion  - Upon arrival to the hospial she was hypoxic requiring 3L NC, weaned to 2L today  - CXR with likely develop consolidation  - CTA shows New patchy consolidative opacities in the in the right upper and posterior right lower lobes.  Findings suggestive infectious etiology such as pneumonia, multifocal pneumonia.  Also consider atypical given patient's reported immunocompromised status.  Aspiration could present similarly.  Consider continued follow-up after acute clinical illness has resolved to ensure resolution. New mediastinal and bilateral hilar lymphadenopathy, likely reactive.  - completed azithromycin and 7 day course of Levaquin on 4/30.  - CXR on admission showing possible developing consolidation: Cefepime 4/30-5/3    - See acute respiratory failure with hypoxia above     Controlled type 2 diabetes mellitus without complication, without long-term current use of insulin  - Will hold home po diabetic medications  -   Accu-Cheks AC/HS, diabetic diet  - Goal bg 140-180  -  Continue on low-dose sliding scale insulin    Insomnia  - Continue nightly melatonin 9 mg qHS, remeron 7.5 mg qHS.       Adjustment disorder with mixed anxiety and depressed mood  - continue home celexa 20 mg daily     CAD (coronary artery disease)  - S/P left carotid endarterectomy prior to cancer dx, s/p PCI (3 stents) >20 years ago   - Continue on ASA 81mg daily, platelets are wnl  - On repatha as outpatient (statin intolerant)    Debility   - Continue with PT/OT for gait training and strengthening and restoration of ADL's   - Encourage mobility, OOB in chair, and early ambulation as appropriate  - Fall precautions   - Monitor for bowel and bladder  dysfunction  - Monitor for and prevent skin breakdown and pressure ulcers  - Continue DVT prophylaxis with  frequent ambulation         Anticipate disposition:  Home with home health      Follow-up needed during SNF stay-    Appointments not to send patient to- 5/30, 5/31    Follow-up needed after discharge from SNF:   - PCP, hospital follow-up, needs to be scheduled  - Oncology, scheduled for after SNF discharge    Future Appointments   Date Time Provider Department Center   5/27/2022  7:00 AM [facility] SPEC LAB [facility] SPECLAB [facility]   5/30/2022 10:30 AM [facility] BMT [facility]   5/31/2022 11:00 AM NURSE 11, [facility]   6/2/2022 12:30 PM [facility]   6/2/2022  1:30 PM Gita Valdes MD [facility] HC BMT [facility]   6/3/2022 11:00 AM NURSE 11, [facility]         Fidelina Maxwell NP  Department of Hospital Medicine   [facility]     DOS: 5/24/2022       Patient note was created using MModal Dictation.  Any errors in syntax or even information may not have been identified and edited on initial review prior to signing this note.

## 2022-05-24 NOTE — PT/OT/SLP PROGRESS
Occupational Therapy   Treatment    Name: Susan Puente  MRN: [id number]  Admit Date: 5/19/2022  Admitting Diagnosis:  Acute respiratory failure with hypoxia    General Precautions: Standard, fall   Orthopedic Precautions:N/A   Braces: N/A     Recommendations:     Discharge Recommendations: home with home health  Level of Assistance Recommended at Discharge: Intermittent assistance for ADL's and homemaking tasks  Discharge Equipment Recommendations:  bedside commode, shower chair, walker, rolling  Barriers to discharge:  Inaccessible home environment, Decreased caregiver support    Assessment:     Susan Puente is a 71 y.o. female with a medical diagnosis of Acute respiratory failure with hypoxia .  She remains limited in performance of self-care , functional mobility and ADLs and currently not performing tasks at OF . Currently presenting with performance deficits including weakness, impaired endurance, impaired self care skills, impaired functional mobilty, gait instability, impaired balance, decreased upper extremity function, decreased lower extremity function, decreased coordination, impaired cardiopulmonary response to activity.  Pt tolerated Tx without incident and is making progress with self care tasks, functional mobility and functional transfers . She would continue to benefit from OT intervention to further her functional (I)ce and safety.    Rehab Potential is good    Activity tolerance:  Good    Plan:     Patient to be seen 6 x/week to address the above listed problems via self-care/home management, therapeutic activities, therapeutic exercises    · Plan of Care Expires: 06/20/22  · Plan of Care Reviewed with: patient    Subjective     Communicated with: nurse prior to session.     Pain/Comfort:  · Pain Rating 1: 0/10  · Pain Rating Post-Intervention 1: 0/10    Patient's cultural, spiritual, Muslim conflicts given the current situation:  · no    Objective:     Patient found up in chair  with oxygen upon OT entry to room.    Bed Mobility:    · Pt seated in W/C  at onset of therapy session.    Functional Mobility/Transfers:  · Patient completed Sit <> Stand Transfer with minimum assistance  with  rolling walker   · Patient completed Bed <> Chair Transfer using Step Transfer technique with minimum assistance with rolling walker  Functional Mobility: Patient propelled W/C from her room to therapy gym using (B) UEs a distance of 185 ft with no assist but increased time required..       Pt worked on functional standing activity consisting of standing with RW while reaching in all planes , crossing of midline and reaching to varying heights to facilitate (B) wt shifting and stability in standing in prep for performance of self care tasks and functional ADLS in standing.  Pt tolerated up to 9 min 44 sec and 11 min 55 sec. in standing with (S) and RW to steady.    [facility] 6 Click ADL: 19    OT Exercises: UE Ergometer performed 10 minutes on UBE with Mod resistance. UE exercises performed to increase functional endurance and strength in order increase independence when performing self care tasks, functional ambulation, W/C propulsion, and functional standing activities .    Treatment & Education:  Pt edu on POC, safety when performing self care tasks ,  safety when performing functional transfers and mobility.  - White board updated  - Self care tasks completed-- as noted above       Patient left up in chair with call button in reach and friend presentEducation:      GOALS:   Multidisciplinary Problems     Occupational Therapy Goals        Problem: Occupational Therapy    Goal Priority Disciplines Outcome Interventions   Occupational Therapy Goal     OT, PT/OT Ongoing, Progressing    Description: Goals to be met by: 6/3/22    Patient will increase functional independence with ADLs by performing:    UE Dressing with [place]. = MET  LE Dressing with Modified [place].  Grooming while standing at sink  with Supervision.  Toileting from toilet with Supervision Assistance for hygiene and clothing management.   Bathing from  shower chair/bench with Supervision.  Step transfer with Supervision using RW.   Toilet transfer to toilet with Supervision.  Upper extremity exercise program with supervision.                         Time Tracking:     OT Date of Treatment: 05/24/22  OT Start Time: 1415    OT Stop Time: 1500  OT Total Time (min): 45 min    Billable Minutes:Therapeutic Activity 35  Therapeutic Exercise 10    5/24/2022

## 2022-05-24 NOTE — PT/OT/SLP PROGRESS
Physical Therapy Treatment    Patient Name:  Susan Punete   MRN:  [id number]  Admit Date: 5/19/2022  Admitting Diagnosis: Acute respiratory failure with hypoxia  Recent Surgeries: N/A  General Precautions: Standard, fall   Orthopedic Precautions:N/A   Braces: N/A     Recommendations:     Discharge Recommendations:  home with home health   Level of Assistance Recommended at Discharge: Intermittent assistance   Discharge Equipment Recommendations: bedside commode, walker, rolling   Barriers to discharge: Inaccessible home environment, Decreased caregiver support    Assessment:     Susan Puente is a 71 y.o. female admitted with a medical diagnosis of Acute respiratory failure with hypoxia. Pt tolerated therapy session well with focus on gait training, functional transfers, cardio endurance, LE strengthening, stair training and safety awareness in order to assist with achieving highest level of independence. Pt would continue to benefit from skilled PT services per POC.       Performance deficits affecting function:  weakness, impaired endurance, impaired self care skills, impaired functional mobilty, gait instability, impaired balance, impaired cardiopulmonary response to activity .    Rehab Potential is excellent and good    Activity Tolerance: Good and Fair    Plan:     Patient to be seen 6 x/week to address the above listed problems via gait training, therapeutic activities, therapeutic exercises, neuromuscular re-education, wheelchair management/training    · Plan of Care Expires: 06/19/22  · Plan of Care Reviewed with: patient    Subjective     Pt agreeable to PT, two attempts required due to patient taking shower on first attempt.     Pain/Comfort:  · Pain Rating 1: 0/10  · Pain Rating Post-Intervention 1: 0/10    Patient's cultural, spiritual, Hoahaoism conflicts given the current situation:  · no    Objective:     Communicated with PCT prior to session.  Patient found up in chair with oxygen upon  There is a form in media from 05/22/20. im not sure if this is the correct form. I checked faxes and did not see any form. I called and LVM for patient to return call.   PT entry to room.     Therapeutic Activities and Exercises: nustep x 15 mins, low resistance with O2 sats WFL, 2LO2.    Functional Mobility:  · Transfers:     · Sit to Stand: 2 sets, contact guard assistance with rolling walker  · W/c to nustep chair: contact guard assistance with  no AD  using  Step Transfer  · Gait: x 100', RW, CGA with focus on posture and keeping AD close to pt's body in order to improve pt's safety.   · Stairs:  Pt ascended/descended 4 stair(s) with No Assistive Device with right handrail with Contact Guard Assistance. Pt's RLE slightly buckled due to lateral step up practice.  · Wheelchair Propulsion:  Pt propelled Standard wheelchair x 100 feet on Level tile with  Bilateral upper extremity with Supervision or Set-up Assistance.   · Pt stated feeling slightly light headed after ambulating up and down stairs, but after a couple minutes of rest, patient felt fine with O2 sats WFL throughout therapy session, 2LO2.     AM-PAC 6 CLICK MOBILITY  20    Patient left up in chair with all lines intact and call button in reach.    GOALS:   Multidisciplinary Problems     Physical Therapy Goals        Problem: Physical Therapy    Goal Priority Disciplines Outcome Goal Variances Interventions   Physical Therapy Goal     PT, PT/OT Ongoing, Progressing     Description: PT goals until 6/3/22    1. Pt sit to stand with RW with supervision-not met  2. Pt to perform gait 200ft with RW with supervision.-not met  3. Pt to go up/down curb step with RW with SBA.-not met  4. Pt to up/down 12 steps with R UE rail with no AD with CGA.-not met  5. Pt to perform B LE exs in sitting or supine x 15 reps to strengthen B LE to improve functional mobility.-not met  6. Pt to propel w/c 100ft with B UE on level surface with mod independent-not met  7. Pt to  object off the floor with reacher with RW support with set up assist-not met  8. Pt to ambulated 10ft over uneven surface with Rw with supervision-not met                      Time Tracking:     PT Received On: 05/24/22   Treatment minutes: 53 min          Billable Minutes: Gait Training 10, Therapeutic Activity 28 and Therapeutic Exercise 15    Treatment Type: Treatment  PT/PTA: PTA     PTA Visit Number: 2     05/24/2022

## 2022-05-24 NOTE — PLAN OF CARE
Problem: Occupational Therapy  Goal: Occupational Therapy Goal  Description: Goals to be met by: 6/3/22    Patient will increase functional independence with ADLs by performing:    UE Dressing with [place]. = MET  LE Dressing with Modified [place].  Grooming while standing at sink with Supervision.  Toileting from toilet with Supervision Assistance for hygiene and clothing management.   Bathing from  shower chair/bench with Supervision.  Step transfer with Supervision using RW.   Toilet transfer to toilet with Supervision.  Upper extremity exercise program with supervision.        Outcome: Ongoing, Progressing

## 2022-05-25 NOTE — PLAN OF CARE
Problem: Occupational Therapy  Goal: Occupational Therapy Goal  Description: Goals to be met by: 6/3/22    Patient will increase functional independence with ADLs by performing:    UE Dressing with [place]. = MET  LE Dressing with Modified [place].  Grooming while standing at sink with Supervision.  Toileting from toilet with Supervision Assistance for hygiene and clothing management.   Bathing from  shower chair/bench with Supervision.  Step transfer with Supervision using RW.   Toilet transfer to toilet with Supervision.  Upper extremity exercise program with supervision.        Outcome: Ongoing, Progressing

## 2022-05-25 NOTE — PT/OT/SLP PROGRESS
Physical Therapy Treatment    Patient Name:  Susan Puente   MRN:  [id number]  Admit Date: 5/19/2022  Admitting Diagnosis: Acute respiratory failure with hypoxia  Recent Surgeries: N/A    General Precautions: Standard, fall   Orthopedic Precautions:N/A   Braces:   N/A    Recommendations:     Discharge Recommendations:  home with home health   Level of Assistance Recommended at Discharge: Intermittent assistance   Discharge Equipment Recommendations: bedside commode, walker, rolling   Barriers to discharge: Inaccessible home environment, Decreased caregiver support    Assessment:     Susan Puente is a 71 y.o. female admitted with a medical diagnosis of Acute respiratory failure with hypoxia. Pt participated, and tolerated treatment well. Pt will continue to benefit from skilled PT services to improve all deficits noted below. Resume PT POC as indicated.    Performance deficits affecting function:  weakness, impaired endurance, impaired self care skills, impaired functional mobilty, gait instability, impaired balance, impaired cardiopulmonary response to activity .    Rehab Potential is good    Activity Tolerance: Good    Plan:     Patient to be seen 6 x/week to address the above listed problems via gait training, therapeutic activities, therapeutic exercises, neuromuscular re-education, wheelchair management/training    · Plan of Care Expires: 06/19/22  · Plan of Care Reviewed with: patient    Subjective     Pt was willing to participate with therapy.     Pain/Comfort:  · Pain Rating 1: 0/10  · Pain Rating Post-Intervention 1: 0/10    Patient's cultural, spiritual, [employer] conflicts given the current situation:  · no    Objective:     Communicated with nursing prior to session.  Patient found sitting on toilet  and PCT present   upon PT entry to room.     Therapeutic Activities and Exercises:   -Pt stood at sink for ~10 min while brushing teeth, washing face, and washing hair with SBA    Functional  Mobility:  · Transfers:  Sit to Stand:  minimum assistance with rolling walker  · Toilet Transfer: stand by assistance with  use of bathroom rail using Step transfer  · Gait:   · Stairs:  Pt ascended/descended 4 stair(s) with No Assistive Device with right handrail with Contact Guard Assistance.   · Wheelchair Propulsion:  Pt propelled Standard wheelchair x 120 feet on Level tile with  Bilateral upper extremity with Supervision or Set-up Assistance.     AM-PAC 6 CLICK MOBILITY  20    Patient left up in chair with all lines intact, call button in reach and nursing notified.    GOALS:   Multidisciplinary Problems     Physical Therapy Goals        Problem: Physical Therapy    Goal Priority Disciplines Outcome Goal Variances Interventions   Physical Therapy Goal     PT, PT/OT Ongoing, Progressing     Description: PT goals until 6/3/22    1. Pt sit to stand with RW with supervision-not met  2. Pt to perform gait 200ft with RW with supervision.-not met  3. Pt to go up/down curb step with RW with SBA.-not met  4. Pt to up/down 12 steps with R UE rail with no AD with CGA.-not met  5. Pt to perform B LE exs in sitting or supine x 15 reps to strengthen B LE to improve functional mobility.-not met  6. Pt to propel w/c 100ft with B UE on level surface with mod independent-not met  7. Pt to  object off the floor with reacher with RW support with set up assist-not met  8. Pt to ambulated 10ft over uneven surface with Rw with supervision-not met                     Time Tracking:     PT Received On: 05/25/22    Start Time: 0845  PT Stop Time: 0945  PT Total Time (min): 60 minutes           Billable Minutes: Gait Training 15, Therapeutic Activity 23 and Therapeutic Exercise 15    Treatment Type: Treatment  PT/PTA: PTA     PTA Visit Number: 3     05/25/2022

## 2022-05-25 NOTE — PROGRESS NOTES
[facility] - Skilled Nursing  Wound Care    Patient Name:  Susan Puente   MRN:  [id number]  Date: 2022  Diagnosis: Acute respiratory failure with hypoxia    History:     Past Medical History:   Diagnosis Date    Allergic rhinitis     Allergy     Anemia     Anxiety     CAD (coronary artery disease)     Carotid stenosis     Colon polyps     Controlled type 2 diabetes mellitus without complication, without long-term current use of insulin 10/19/2016    Depression     GERD (gastroesophageal reflux disease)     Hearing loss in right ear     Hx of psychiatric care     Celexa, Prozac    Hypokalemia 2/3/2020    MDS (myelodysplastic syndrome) with 5q deletion     Psychiatric problem     Therapy        Social History     Socioeconomic History    Marital status: Single   Tobacco Use    Smoking status: Former Smoker     Packs/day: 1.50     Years: 50.00     Pack years: 75.00     Types: Cigarettes, Vaping with nicotine     Quit date: 3/3/2017     Years since quittin.2    Smokeless tobacco: Never Used   Substance and Sexual Activity    Alcohol use: No    Drug use: No   Other Topics Concern    Patient feels they ought to cut down on drinking/drug use No    Patient annoyed by others criticizing their drinking/drug use No    Patient has felt bad or guilty about drinking/drug use No    Patient has had a drink/used drugs as an eye opener in the AM No   Social History Narrative    Single, never , no children, former , [employer]     Social Determinants of Health     Financial Resource Strain: Low Risk     Difficulty of Paying Living Expenses: Not hard at all   Food Insecurity: No Food Insecurity    Worried About Running Out of Food in the Last Year: Never true    Ran Out of Food in the Last Year: Never true   Transportation Needs: No Transportation Needs    Lack of Transportation (Medical): No    Lack of Transportation (Non-Medical): No   Physical Activity: Inactive    Days of Exercise per  Week: 0 days    Minutes of Exercise per Session: 0 min   Stress: No Stress Concern Present    Feeling of Stress : Only a little   Social Connections: Moderately Isolated    Frequency of Communication with Friends and Family: More than three times a week    Frequency of Social Gatherings with Friends and Family: More than three times a week    Attends Confucianist Services: More than 4 times per year    Active Member of Clubs or Organizations: No    Attends Club or Organization Meetings: Never    Marital Status: Never    Housing Stability: Low Risk     Unable to Pay for Housing in the Last Year: No    Number of Places Lived in the Last Year: 1    Unstable Housing in the Last Year: No       Precautions:     Allergies as of 05/19/2022 - Reviewed 05/19/2022   Allergen Reaction Noted    Revlimid [lenalidomide] Swelling 06/08/2017       WOC Assessment Details/Treatment   Wound care consulted for sacrum  The left coccyx has a raised hardened area (appears to be bone)  with Intertrigo/IAD.  The samanta-wound skin is reddened, intact.  The coccyx area appears to have scar tissue.   She moves independently in bed, lifting her buttocks.    She has a EHOB waffle overlay in place for pressure redistribution, [place] in place for moisture control.   Discussed wound care treatment with MsRoberto Carlos Puente, verbalized understanding.     Plan:  Chair cushion  Coccyx/buttocks- triad ointment BID/prn    Triad will assist with moisture management, moist wound healing, and autolytic debridement of non-viable tissue     Nursing to continue care, pressure prevention measures  Wound care will follow-up prn    Recommendations made to primary team for above plan per written report . Orders placed.      05/25/22 0750        Altered Skin Integrity 05/25/22 0750 Left Coccyx Incontinence associated dermatitis   Date First Assessed/Time First Assessed: 05/25/22 0750   Altered Skin Integrity Present on Admission: yes  Side: Left  Location: Coccyx   Primary Wound Type: Incontinence associated dermatitis   Wound Image    Dressing Appearance Open to air   Drainage Amount None   Appearance Pink;Moist   Tissue loss description Partial thickness   Periwound Area Moist;[place];Redness   Wound Edges Open   Wound Length (cm) 0.8 cm   Wound Width (cm) 0.2 cm   Wound Depth (cm) 0.2 cm   Wound Volume (cm^3) 0.032 cm^3   Wound Surface Area (cm^2) 0.16 cm^2   Care Cleansed with:;Soap and water;Applied:;Skin Barrier   Skin Interventions   Pressure Reduction Techniques frequent weight shift encouraged;pressure points protected;positioned off wounds     05/25/2022

## 2022-05-25 NOTE — PROGRESS NOTES
[facility]                                  Skilled Nursing Facility                   Progress Note     Admit Date: 5/19/2022  SHABBIR 6/6/2022  Principal Problem:  Acute respiratory failure with hypoxia   HPI obtained from patient interview and chart review     Chief Complaint: Re-evaluation of medical treatment and therapy status, lab review, evaluation of supplemental O2    HPI:    Ms. Puente is a 71- year old patient with PMHx of MDS on treatment with Vidaza (currently receiving decitabine due to national Vidaza shortage; Dr. Valdes pt),  DM2, HTN, CAD, anxiety, and depression  Who presents to SNF following hospitalization for acute respiratory failure with hypoxia.    Interval history:   All of today's labs reviewed and are listed below.  BUN improved to 63 from 68. K 5.2.  24 hr vital sign ranges listed below.  Patient utilizing supplemental O2 this morning, patient reminded to intermittently remove for continue weaning efforts.  Patient denies shortness of breath, abdominal discomfort, nausea, or vomiting.  Patient reports an adequate appetite.  Patient denies dysuria.  Patient reports having regular bowel movements.  Patient progessing with PT/OT- Gait: x 100', RW, CGA with focus on posture and keeping AD close to pt's body in order to improve pt's safety. Continuing to follow and treat all acute and chronic conditions.    Past Medical History: Patient has a past medical history of Allergic rhinitis, Allergy, Anemia, Anxiety, CAD (coronary artery disease), Carotid stenosis, Colon polyps (2016), Controlled type 2 diabetes mellitus without complication, without long-term current use of insulin (10/19/2016), Depression, GERD (gastroesophageal reflux disease), Hearing loss in right ear, psychiatric care, Hypokalemia (2/3/2020), MDS (myelodysplastic syndrome) with 5q deletion, Psychiatric problem, and Therapy.    Past Surgical History:  Patient has a past surgical history that includes Tonsillectomy; Carotid stent; Carotid endarterectomy (Left, 2011); Bunionectomy; Endometrial ablation; Colonoscopy (N/A, 12/20/2016); Tympanostomy tube placement; Bone marrow biopsy (Left, 6/7/2018); Bone marrow biopsy (Left, 3/21/2019); Bone marrow biopsy (Left, 10/24/2019); Insertion of tunneled central venous catheter (CVC) with subcutaneous port (N/A, 2/19/2020); and Bone marrow biopsy (Left, 4/21/2021).    Social History: Patient reports that she quit smoking about 5 years ago. Her smoking use included cigarettes and vaping with nicotine. She has a 75.00 pack-year smoking history. She has never used smokeless tobacco. She reports that she does not drink alcohol and does not use drugs.    Family History: family history includes Alcohol abuse in her brother and father; Cancer in her paternal grandmother; Heart disease in her father and mother; Hyperlipidemia in her mother.    Allergies: Patient is allergic to revlimid [lenalidomide].    ROS  Constitutional: Negative for fever   Eyes: Negative for blurred vision, double vision   Respiratory: Negative for cough, shortness of breath   Cardiovascular: Negative for chest pain, palpitations, and leg swelling.   Gastrointestinal: Negative for abdominal pain, constipation, diarrhea, nausea, vomiting.   Genitourinary: Negative for dysuria, frequency   Musculoskeletal:  + generalized weakness.  Improving pain to right foot  Skin: Negative for itching and rash.   Neurological: Negative for dizziness, headaches.   Psychiatric/Behavioral: Negative for depression. The patient is not nervous/anxious.      24 hour Vital Sign Range   Temp:  [96.6 °F (35.9 °C)-97.9 °F (36.6 °C)]   Pulse:  [68-77]   Resp:  [16-17]   BP: (130-135)/(59-61)   SpO2:  [92 %-96 %]     PEx  Constitutional: Patient appears debilitated.  No distress noted  HENT:   Head: Normocephalic and atraumatic.   Eyes: Pupils are equal, round  Neck: Normal range of  motion. Neck supple.   Cardiovascular: Normal rate, regular rhythm and normal heart sounds.    Pulmonary/Chest: Effort normal and breath sounds are clear    Abdominal: Soft. Bowel sounds are normal.   Musculoskeletal: Normal range of motion.   Neurological: Alert and oriented to person, place, and time.   Psychiatric: Normal mood and affect. Behavior is normal.   Skin: Skin is warm and dry.  Redness to right foot a great toe, improving    Recent Labs   Lab 05/25/22  0419      K 5.2*      CO2 30*   BUN 63*   CREATININE 0.7       No results for input(s): WBC, RBC, HGB, HCT, PLT, MCV, MCH, MCHC in the last 24 hours.      Assessment and Plan:    Azotemia  - slowly improving, continue to hold chlorthalidone at this time    Hyperkalemia  - mild, discontinuing daily potassium replacement    Acute respiratory failure with hypoxia  - Patient admitted with acute hypoxic respiratory failure, she was treated for PNA during recent admission and was initiated on home oxygen after 6 min walk test previous admission.  - CXR with pulmonary consolidation and fever on admission, recently completed levaquin/azithro course for CAP.   - Completed Cefepime course.  - Continue scheduled DuoNebs  - 5/9: Pulmonary consulted given increasing O2 requirements.   - S/p Bronch 5/11 AM. AFB/KOH staining was negative. Gram stain negative. Remaining cultures/studies NGTD. Bronch cytology with macrophages and inflammation.  - Continue Prednisone taper 60mg x 2wks, then taper down by 10mg every two weeks until complete.  - Given prolonged steroid taper, will continue GI ppx and dapsone for PJP ppx  - Will f/u with pulm outpatient   - 5/25 respiratory status improving, continue weaning efforts     Acute gout of foot  - patient reports difficulty bearing weight on bilat feet due to pain, improving  - x-ray showing soft tissue edema suggestive of gout flare  - uric acid wnl  - allopurinol 100 mg daily for prevention  -   Prednisone taper also  to treat  - 5/24 pain and redness improving     MDS (myelodysplastic syndrome)  - Primary oncologist, Dr. Gita Valdes  [place] [facility] [place]  - Initially with 5 q minus syndrome and treated with Revlimid. Developed allergy and was then desensitized. Soon after developed pancytopenia and Revlimid was stopped June 2017.    - BMBX on 6/8/17 was with normal cytogenetics. 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 Of 20 metaphases, 5 were normal and 15 had a 5q deletion, NGS positive for SF3B1 and TP53 (12%). 1.4% blasts  - Received vidaza for 22 cycles, received Decitabine for C23 due to national shortage of vidaza and then back to vidaza with C24.  - completed cycle 39 on 4/8/2  - due for cycle 40 on 05/02/2022, postponed whiled admitted and to be readdressed as outpatient   - 5/23 message sent to Oncology team, see HPI for details     Essential hypertension  - At home regimen: lisinopril/hctz and coreg  - Per [facility] [place], Discussed with cardiology who recommended switching HCTZ to Chlorthalidone 25mg daily and adding Nifedipine XL 60mg with outpatient follow up. Nifedipine was held d/t hypotension and to allow for some permissible hypertension given clinical status  -  Continue Current anti-HTN regimen: Lisinopril 40mg daily, Coreg 25mg BID, Chlorthalidone 25mg daily. Nifedipine XL 60mg on  - Will need cardiology f/u outpatient  - 5/23 reduce lisinopril to 20mg daily, BP low to normal      Pancytopenia due to antineoplastic chemotherapy  - Transfuse for hgb < 7  or plts < 10  - Continue acyclovir ppx  - Cefepime and fluconazole d/c'd 5/12 given normal WBC and afebrile   -  Monitor twice weekly CBCs     CAP (community acquired pneumonia)  - Pt with 2 weeks of productive cough, nasal congestion  - Upon arrival to the hospial she was hypoxic requiring 3L NC, weaned to 2L today  - CXR with likely develop consolidation  - CTA shows New patchy consolidative opacities in the in the right upper and posterior right lower lobes.  Findings suggestive infectious etiology such as pneumonia, multifocal pneumonia.  Also consider atypical given patient's reported immunocompromised status.  Aspiration could present similarly.  Consider continued follow-up after acute clinical illness has resolved to ensure resolution. New mediastinal and bilateral hilar lymphadenopathy, likely reactive.  - completed azithromycin and 7 day course of Levaquin on 4/30.  - CXR on admission showing possible developing consolidation: Cefepime 4/30-5/3    - See acute respiratory failure with hypoxia above     Controlled type 2 diabetes mellitus without complication, without long-term current use of insulin  - Will hold home po diabetic medications  -   Accu-Cheks AC/HS, diabetic diet  - Goal bg 140-180  -  Continue on low-dose sliding scale insulin    Insomnia  - Continue nightly melatonin 9 mg qHS, remeron 7.5 mg qHS.      Moderate malnutrition  - RD following, appreciate recommendations  - continue supplements as ordered     Adjustment disorder with mixed anxiety and depressed mood  - continue home celexa 20 mg daily     CAD (coronary artery disease)  - S/P left carotid endarterectomy prior to cancer dx, s/p PCI (3 stents) >20 years ago   - Continue on ASA 81mg daily, platelets are wnl  - On repatha as outpatient (statin intolerant)    Debility   - Continue with PT/OT for gait training and strengthening and restoration of ADL's   - Encourage mobility, OOB in chair, and early ambulation as appropriate  - Fall  precautions   - Monitor for bowel and bladder dysfunction  - Monitor for and prevent skin breakdown and pressure ulcers  - Continue DVT prophylaxis with  frequent ambulation         Anticipate disposition:  Home with home health      Follow-up needed during SNF stay-    Appointments not to send patient to- 5/30, 5/31    Follow-up needed after discharge from SNF:   - PCP, hospital follow-up, needs to be scheduled  - Oncology, scheduled for after SNF discharge    Future Appointments   Date Time Provider Department Center   5/27/2022  7:00 AM [facility] SPEC LAB [facility] SPECLAB [facility]   5/30/2022 10:30 AM [facility] LAB BMT [facility]   5/31/2022 11:00 AM NURSE 11, [facility]   6/2/2022 12:30 PM [facility] BMT [facility]   6/2/2022  1:30 PM Gita Valdes MD [facility] HC BMT [facility]   6/3/2022 11:00 AM NURSE 11, [facility]         Fidelina Maxwell NP  Department of Hospital Medicine   [facility]     DOS: 5/25/2022       Patient note was created using MModal Dictation.  Any errors in syntax or even information may not have been identified and edited on initial review prior to signing this note.

## 2022-05-25 NOTE — TREATMENT PLAN
"Rehab Services' DME recommendations    Susan Puente  MRN: [id number]       [x] Walker Adult (5'4"-6"6")    Accessories N/A    Wheels Yes    [x] 3 in 1 commode Standard    [x] Shower Chair With back    [x] Home health PT, OT and Aide    Chhaya Galan, PTA 5/25/2022       "

## 2022-05-25 NOTE — PT/OT/SLP PROGRESS
Occupational Therapy   Treatment    Name: Susan Puente  MRN: [id number]  Admit Date: 5/19/2022  Admitting Diagnosis:  Acute respiratory failure with hypoxia    General Precautions: Standard, fall   Orthopedic Precautions:N/A   Braces: N/A     Recommendations:     Discharge Recommendations: home with home health  Level of Assistance Recommended at Discharge: Intermittent assistance for ADL's and homemaking tasks  Discharge Equipment Recommendations:  bedside commode, shower chair, walker, rolling  Barriers to discharge:  Inaccessible home environment, Decreased caregiver support    Assessment:     Susan Puente is a 71 y.o. female with a medical diagnosis of Acute respiratory failure with hypoxia .  She remains limited in performance of self-care , functional mobility and ADLs and currently not performing tasks at OF . Currently presenting with performance deficits including impaired endurance, impaired self care skills, impaired functional mobilty, gait instability, impaired balance, decreased lower extremity function, decreased coordination, decreased safety awareness, impaired cardiopulmonary response to activity, decreased ROM.  Pt tolerated Tx without incident and is making progress with self care tasks, functional mobility and functional transfers .  She would continue to benefit from OT intervention to further her functional (I)ce and safety.    Rehab Potential is good    Activity tolerance:  Good    Plan:     Patient to be seen 6 x/week to address the above listed problems via self-care/home management, therapeutic activities, therapeutic exercises    · Plan of Care Expires: 06/20/22  · Plan of Care Reviewed with: patient    Subjective     Communicated with: nurse prior to session.   Pain/Comfort:  · Pain Rating 1: 0/10  · Pain Rating Post-Intervention 1: 0/10    Patient's cultural, spiritual, Jain conflicts given the current situation:  · no    Objective:     Patient found up in chair with  oxygen upon OT entry to room.    Bed Mobility:     · Pt seated in W/C at onset of therapy session.    Functional Mobility/Transfers:  · Patient completed Sit <> Stand Transfer with minimum assistance  with  rolling walker   · Patient completed Bed <> Chair Transfer using Step Transfer technique with minimum assistance with rolling walker  Functional Mobility: Patient propelled W/C from her room to therapy gym using (B) UEs a distance of 160 ft with no assist but O2 in tow..     Pt worked on functional standing activity consisting of standing with RW while reaching in all planes , crossing of midline and reaching to varying heights to facilitate (B) wt shifting and stability in standing in prep for performance of self care tasks and functional ADLS in standing.  Pt tolerated up to  12 Min. in standing with SBA and RW to steady.    · Patient propelled W/C from therapy gym to  her room using (B) UEs a distance of 162 ft with no assist but O2 in tow.   ·   [facility] 6 Click ADL: 21    OT Exercises: UE Ergometer performed 10 minutes on UBE with Mod resistance.  UE exercises performed to increase functional endurance and strength in order increase independence when performing self care tasks, functional ambulation, W/C propulsion, and functional standing activities .    Treatment & Education:  Pt edu on POC, safety when performing self care tasks , safety when performing functional transfers and mobility.  - White board updated  - Self care tasks completed-- as noted above       Patient left up in chair with all lines intact and call button in reachEducation:      GOALS:   Multidisciplinary Problems     Occupational Therapy Goals        Problem: Occupational Therapy    Goal Priority Disciplines Outcome Interventions   Occupational Therapy Goal     OT, PT/OT Ongoing, Progressing    Description: Goals to be met by: 6/3/22    Patient will increase functional independence with ADLs by performing:    UE Dressing with [place].  = MET  LE Dressing with Modified [place].  Grooming while standing at sink with Supervision.  Toileting from toilet with Supervision Assistance for hygiene and clothing management.   Bathing from  shower chair/bench with Supervision.  Step transfer with Supervision using RW.   Toilet transfer to toilet with Supervision.  Upper extremity exercise program with supervision.                         Time Tracking:     OT Date of Treatment: 05/25/22  OT Start Time: 1410    OT Stop Time: 1455  OT Total Time (min): 45 min    Billable Minutes:Therapeutic Activity 30  Therapeutic Exercise 15    5/25/2022

## 2022-05-26 NOTE — PLAN OF CARE
05/25/22 1400   Post-Acute Status   Post-Acute Authorization Placement   Post-Acute Placement Status Referrals Sent   Discharge Plan   Discharge Plan A New Nursing Home placement - skilled nursing care facility     Case  discussed with the patient's brother the patient's discharge plan to skilled nursing NH placement. Referrals were sent to [facility] (1st choice), [facility] (2nd choice) and [facility]). Case  called [facility] to ensure that they were aware of the new referral sent on this patient. Case  was told by admissions that they will review the patient's information and inform about acceptance or denial. Arian Carrington  called in the [facility]. Awaiting 142.

## 2022-05-26 NOTE — PLAN OF CARE
Interdisciplinary team, Cyril Ray, Unit Director, Katrin Miramontes RN MDS Coordinator, Areli Meza PT, Rehab Supervisor, Rebeca Castaneda RN, Case Management, and Jana Camara, Dietician, spoke to patient for care plan conference, weekly status update, and therapy progress update. Discharge date set for 6/6/22.

## 2022-05-26 NOTE — PT/OT/SLP PROGRESS
Physical Therapy Treatment    Patient Name:  Susan Puente   MRN:  [id number]  Admit Date: 5/19/2022  Admitting Diagnosis: Acute respiratory failure with hypoxia  Recent Surgeries: N/A    General Precautions: Standard, fall   Orthopedic Precautions:N/A   Braces: N/A     Recommendations:     Discharge Recommendations:  home with home health   Level of Assistance Recommended at Discharge: Intermittent assistance   Discharge Equipment Recommendations: bedside commode, walker, rolling   Barriers to discharge: Inaccessible home environment, Decreased caregiver support    Assessment:     Susan Puente is a 71 y.o. female admitted with a medical diagnosis of Acute respiratory failure with hypoxia. Pt tolerated therapy session well, weaning off oxygen and was able to maintain 94% and above throughout session on RA. Pt session focused on gait training, transfers, LE strengthening and stair training in order to assist with preparing pt for d/c from PT. Pt would continue to benefit from skilled PT services per POC.      Performance deficits affecting function:  weakness, impaired endurance, impaired self care skills, impaired functional mobilty, gait instability, impaired balance, impaired cardiopulmonary response to activity .    Rehab Potential is excellent and good    Activity Tolerance: Good and Fair    Plan:     Patient to be seen 6 x/week to address the above listed problems via gait training, therapeutic activities, therapeutic exercises, neuromuscular re-education, wheelchair management/training    · Plan of Care Expires: 06/19/22  · Plan of Care Reviewed with: patient    Subjective     Pt agreeable to PT.     Pain/Comfort:  · Pain Rating 1: 0/10  · Pain Rating Post-Intervention 1: 0/10    Patient's cultural, spiritual, Baptist conflicts given the current situation:  · no    Objective:     Patient found up in chair with oxygen upon PT entry to room.     Therapeutic Activities and Exercises: nustep x 15  mins, mod resistance without rest.    Functional Mobility:  · Transfers:     · Sit to Stand: 3 sets, contact guard assistance with rolling walker  · Bed to Chair: contact guard assistance with  no AD  using  Step Transfer  · Gait: x 130', RW, CGA and follow with w/c.  · Stairs:  Pt ascended/descended 8 stair(s) with No Assistive Device with bilateral handrails with Contact Guard Assistance.   · Wheelchair Propulsion:  Pt propelled Standard wheelchair x 120 feet on Level tile with  Bilateral upper extremity with Modified Independent.     AM-PAC 6 CLICK MOBILITY  20    Patient left up in chair with call button in reach.    GOALS:   Multidisciplinary Problems     Physical Therapy Goals        Problem: Physical Therapy    Goal Priority Disciplines Outcome Goal Variances Interventions   Physical Therapy Goal     PT, PT/OT Ongoing, Progressing     Description: PT goals until 6/3/22    1. Pt sit to stand with RW with supervision-not met  2. Pt to perform gait 200ft with RW with supervision.-not met  3. Pt to go up/down curb step with RW with SBA.-not met  4. Pt to up/down 12 steps with R UE rail with no AD with CGA.-not met  5. Pt to perform B LE exs in sitting or supine x 15 reps to strengthen B LE to improve functional mobility.-not met  6. Pt to propel w/c 100ft with B UE on level surface with mod independent-not met  7. Pt to  object off the floor with reacher with RW support with set up assist-not met  8. Pt to ambulated 10ft over uneven surface with Rw with supervision-not met                     Time Tracking:     PT Received On: 05/26/22             Billable Minutes: Gait Training 17, Therapeutic Activity 15 and Therapeutic Exercise 15    Treatment Type: Treatment  PT/PTA: PTA     PTA Visit Number: 4     05/26/2022

## 2022-05-26 NOTE — PT/OT/SLP PROGRESS
"Occupational Therapy   Treatment    Name: Susan Puente  MRN: [id number]  Admit Date: 5/19/2022  Admitting Diagnosis:  Acute respiratory failure with hypoxia    General Precautions: Standard, fall   Orthopedic Precautions:N/A   Braces:       Recommendations:     Discharge Recommendations: home with home health  Level of Assistance Recommended at Discharge: Intermittent assistance for ADL's and homemaking tasks  Discharge Equipment Recommendations:  bedside commode, shower chair, walker, rolling  Barriers to discharge:  Inaccessible home environment, Decreased caregiver support    Assessment:     Susan Puente is a 71 y.o. female with a medical diagnosis of Acute respiratory failure with hypoxia . Performance deficits affecting function are   impaired endurance, impaired self care skills, impaired functional mobilty, gait instability, impaired balance, decreased lower extremity function, decreased coordination, decreased safety awareness, impaired cardiopulmonary response to activity, decreased ROM. Pt. participated well with session on this day. Pt is progressing well with session on this day still continues to requires cues with aspects of safety . Increased time with ADLs on this day. Pt. Will continue to benefit from continued OT to progress towards goals    Rehab Potential is good    Activity tolerance:  Good    Plan:     Patient to be seen 6 x/week to address the above listed problems via self-care/home management, therapeutic activities, therapeutic exercises    · Plan of Care Expires: 06/20/22  · Plan of Care Reviewed with: patient    Subjective     Communicated with: neri prior to session. "I need to get up and wash off"    Pain/Comfort:  Pain Rating 1: 0/10  Pain Rating Post-Intervention 1: 0/10    Patient's cultural, spiritual, Sabianist conflicts given the current situation:  no    Objective:     Patient found sitting edge of bed  upon OT entry to room.    Bed Mobility:    · Pt. Sitting EOB on " arrival     Functional Mobility/Transfers:  · Patient completed Sit <> Stand Transfer with contact guard assistance  with  rolling walker   · Patient completed Bed <> Chair Transfer using Step Transfer technique with contact guard assistance with rolling walker  · Patient completed Toilet Transfer Step Transfer technique with contact guard assistance with  rolling walker  · Functional Mobility: Pt. With fxl mobility throughout room and bathroom with RW and CGA. No LOB noted    Activities of Daily Living:  · Grooming: modified independence with grooming aspects while seated  · Bathing: stand by assistance with all bathing aspects while seated at sink level   · Upper Body Dressing: supervision to doff/julius pullover shirt   · Lower Body Dressing: stand by assistance to thread pants through BLEs and manage over hips instance and julius undergarments   · Toileting: stand by assistance with hygiene and clothing management     [facility] 6 Click ADL: 21    Treatment & Education:  Pt. With standing and therex performed to increase ROM, endurance selfcare task and fxl mobility for independence     Patient left up in chair with all lines intact and call button in reachEducation:      GOALS:   Multidisciplinary Problems     Occupational Therapy Goals        Problem: Occupational Therapy    Goal Priority Disciplines Outcome Interventions   Occupational Therapy Goal     OT, PT/OT Ongoing, Progressing    Description: Goals to be met by: 6/3/22    Patient will increase functional independence with ADLs by performing:    UE Dressing with [place]. = MET  LE Dressing with Modified [place].  Grooming while standing at sink with Supervision.  Toileting from toilet with Supervision Assistance for hygiene and clothing management.   Bathing from  shower chair/bench with Supervision.  Step transfer with Supervision using RW.   Toilet transfer to toilet with Supervision.  Upper extremity exercise program with supervision.                          Time Tracking:     OT Date of Treatment: 05/26/22         Billable Minutes:Self Care/Home Management 48    5/26/2022   OT and MARLOW have discussed the above patients goals and status in collaboration with Plan of Care.

## 2022-05-27 NOTE — PT/OT/SLP PROGRESS
Physical Therapy Treatment    Patient Name:  Susan Puente   MRN:  [id number]  Admit Date: 5/19/2022  Admitting Diagnosis: Acute respiratory failure with hypoxia  Recent Surgeries: none    General Precautions: Standard, fall   Orthopedic Precautions:N/A   Braces:   none    Recommendations:     Discharge Recommendations:  home with home health   Level of Assistance Recommended at Discharge: Intermittent assistance   Discharge Equipment Recommendations: bedside commode, walker, rolling   Barriers to discharge: Inaccessible home environment, Decreased caregiver support    Assessment:     Susan Puente is a 71 y.o. female admitted with a medical diagnosis of Acute respiratory failure with hypoxia . Pt shazia session well w/ good participation. She is progressing well w/ her mobility but does remain at a CGA/SBA level for transfers, gait, and advanced gait activities due to instability and weakness. She will continue to benefit from skilled PT services.      Performance deficits affecting function:  weakness, impaired endurance, impaired self care skills, impaired functional mobilty, gait instability, impaired balance, impaired cardiopulmonary response to activity .    Rehab Potential is good    Activity Tolerance: Good    Plan:     Patient to be seen 6 x/week to address the above listed problems via gait training, therapeutic activities, therapeutic exercises, neuromuscular re-education, wheelchair management/training    · Plan of Care Expires: 06/19/22  · Plan of Care Reviewed with: patient    Subjective     Pt agreeable to session.     Pain/Comfort:  · Pain Rating 1: 0/10  · Pain Rating Post-Intervention 1: 0/10    Patient's cultural, spiritual, Restoration conflicts given the current situation:  · no    Objective:     Communicated with patient prior to session.  Patient found up in chair with  (no lines) upon PT entry to room.     Therapeutic Activities and Exercises:   Pt in standing used a reacher to   "3 rings from the floor w/ a RW and SBA       Functional Mobility:  · Bed Mobility:   · Roll left/right on bed IND  · Supine<>sit on bed IND  · HOB flat and w/o use of side rail  · Transfers:    · Sit<>stand to/from w/c; multiple trials; all w/ RW and SBA  Stand pivot w/c<>EOB w/ RW and SBA  · Gait:   · 150ft w/ RW and CGA for safety  · Cues for posture and to inc step width  · Stairs:    · Asc/carter 4" curb w/ RW and CGA  · Asc/carter 4 steps w/ BUE on RHR and CGA  · Cues/demo for technique  · Wheelchair Propulsion:   · >150ft w/ BUE Damir    AM-PAC 6 CLICK MOBILITY  20    Patient left up in chair with call button in reach.    GOALS:   Multidisciplinary Problems     Physical Therapy Goals        Problem: Physical Therapy    Goal Priority Disciplines Outcome Goal Variances Interventions   Physical Therapy Goal     PT, PT/OT Ongoing, Progressing     Description: PT goals until 6/6/22    1. Pt sit to stand with RW with supervision-not met  2. Pt to perform gait 200ft with RW with supervision.-not met  3. Pt to go up/down curb step with RW with SBA.-not met  4. Pt to up/down 12 steps with R UE rail with no AD with CGA.-not met  5. Pt to perform B LE exs in sitting or supine x 15 reps to strengthen B LE to improve functional mobility.-not met  6. Pt to propel w/c 100ft with B UE on level surface with mod independent- Met 5/27/2022  7. Pt to  object off the floor with reacher with RW support with set up assist-not met  8. Pt to ambulated 10ft over uneven surface with Rw with supervision-not met                     Time Tracking:     PT Received On: 05/27/22  PT Start Time: 1500  PT Stop Time: 1540  PT Total Time (min): 40 min    Billable Minutes: Gait Training 10, Therapeutic Activity 30 and Total Time 40    Treatment Type: Treatment  PT/PTA: PT     PTA Visit Number: 0     05/27/2022  "

## 2022-05-27 NOTE — PLAN OF CARE
Continue diabetic diet, boost glucose daily  Nutrition Goal Status: progressing towards goal        Assessment and Plan     Moderate malnutrition     Moderate/Severe Protein-Calorie Malnutrition  Malnutrition in the context of Chronic Illness/Injury     Related to (etiology):  Decline in ADL's, taste changes     Signs and Symptoms (as evidenced by):  Energy Intake: <75% of estimated energy requirement for > one month  Body Fat Depletion: mild and moderate depletion of orbital's, triceps and thoracic and lumbar region   Muscle Mass Depletion: mild and moderate depletion of temples, clavicle region, interosseous muscle and lower extremities   Fluid Accumulation: moderate     Interventions(treatment strategy):  Carbohydrate modified diet - 2000 calories  Nutrition education 5/19  Commercial beverage(calories and protein) boost glucose Daily  Collaboration with other providers     Nutrition Diagnosis Status:  Continues

## 2022-05-27 NOTE — PLAN OF CARE
Problem: Adult Inpatient Plan of Care  Goal: Plan of Care Review  Outcome: Ongoing, Progressing  Goal: Patient-Specific Goal (Individualized)  Outcome: Ongoing, Progressing  Goal: Absence of Hospital-Acquired Illness or Injury  Outcome: Ongoing, Progressing     Problem: Diabetes Comorbidity  Goal: Blood Glucose Level Within Targeted Range  Outcome: Ongoing, Progressing     Problem: Adjustment to Illness (Sepsis/Septic Shock)  Goal: Optimal Coping  Outcome: Ongoing, Progressing     Problem: Impaired Wound Healing  Goal: Optimal Wound Healing  Outcome: Ongoing, Progressing     Problem: Fall Injury Risk  Goal: Absence of Fall and Fall-Related Injury  Outcome: Ongoing, Progressing     Problem: Malnutrition  Goal: Improved Nutritional Intake  Outcome: Ongoing, Progressing

## 2022-05-27 NOTE — PROGRESS NOTES
[facility]                                  Skilled Nursing Facility                   Progress Note     Admit Date: 5/19/2022  SHABBIR 6/6/2022  Principal Problem:  Acute respiratory failure with hypoxia   HPI obtained from patient interview and chart review     Chief Complaint: Re-evaluation of medical treatment and therapy status, lab review, evaluation of supplemental O2    HPI:    Ms. Puente is a 71- year old patient with PMHx of MDS on treatment with Vidaza (currently receiving decitabine due to national Vidaza shortage; Dr. Valdes pt),  DM2, HTN, CAD, anxiety, and depression  Who presents to SNF following hospitalization for acute respiratory failure with hypoxia.    Interval history:  All of today's labs reviewed and are listed below.  BUN with slight improvement to 57. 24 hour blood glucose range is .  Patient is satting 97% on room air and is tolerating this well.  24 hr vital sign ranges listed below.  PPD and chest x-ray ordered for nursing home placement as patient states she will be discharging to 1. Patient denies shortness of breath, abdominal discomfort, nausea, or vomiting.  Patient reports an adequate appetite.  Patient denies dysuria.  Patient reports having regular bowel movements.  Patient progessing with PT/OT. Continuing to follow and treat all acute and chronic conditions.      Past Medical History: Patient has a past medical history of Allergic rhinitis, Allergy, Anemia, Anxiety, CAD (coronary artery disease), Carotid stenosis, Colon polyps (2016), Controlled type 2 diabetes mellitus without complication, without long-term current use of insulin (10/19/2016), Depression, GERD (gastroesophageal reflux disease), Hearing loss in right ear, psychiatric care, Hypokalemia (2/3/2020), MDS (myelodysplastic syndrome) with 5q deletion, Psychiatric problem, and Therapy.    Past Surgical History: Patient has a past surgical  history that includes Tonsillectomy; Carotid stent; Carotid endarterectomy (Left, 2011); Bunionectomy; Endometrial ablation; Colonoscopy (N/A, 12/20/2016); Tympanostomy tube placement; Bone marrow biopsy (Left, 6/7/2018); Bone marrow biopsy (Left, 3/21/2019); Bone marrow biopsy (Left, 10/24/2019); Insertion of tunneled central venous catheter (CVC) with subcutaneous port (N/A, 2/19/2020); and Bone marrow biopsy (Left, 4/21/2021).    Social History: Patient reports that she quit smoking about 5 years ago. Her smoking use included cigarettes and vaping with nicotine. She has a 75.00 pack-year smoking history. She has never used smokeless tobacco. She reports that she does not drink alcohol and does not use drugs.    Family History: family history includes Alcohol abuse in her brother and father; Cancer in her paternal grandmother; Heart disease in her father and mother; Hyperlipidemia in her mother.    Allergies: Patient is allergic to revlimid [lenalidomide].    ROS  Constitutional: Negative for fever   Eyes: Negative for blurred vision, double vision   Respiratory: Negative for cough, shortness of breath   Cardiovascular: Negative for chest pain, palpitations, and leg swelling.   Gastrointestinal: Negative for abdominal pain, constipation, diarrhea, nausea, vomiting.   Genitourinary: Negative for dysuria, frequency   Musculoskeletal:  + generalized weakness.  Improving pain to right foot  Skin: Negative for itching and rash.   Neurological: Negative for dizziness, headaches.   Psychiatric/Behavioral: Negative for depression. The patient is not nervous/anxious.      24 hour Vital Sign Range   Temp:  [97.8 °F (36.6 °C)-98.1 °F (36.7 °C)]   Pulse:  [80-89]   Resp:  [18]   BP: (124-138)/(58-81)   SpO2:  [93 %-94 %]     PEx  Constitutional: Patient appears debilitated.  No distress noted  HENT:   Head: Normocephalic and atraumatic.   Eyes: Pupils are equal, round  Neck: Normal range of motion. Neck supple.    Cardiovascular: Normal rate, regular rhythm and normal heart sounds.    Pulmonary/Chest: Effort normal and breath sounds are clear    Abdominal: Soft. Bowel sounds are normal.   Musculoskeletal: Normal range of motion.   Neurological: Alert and oriented to person, place, and time.   Psychiatric: Normal mood and affect. Behavior is normal.   Skin: Skin is warm and dry.  Redness to right foot a great toe, improving    Recent Labs   Lab 05/27/22  0434      K 4.7      CO2 27   BUN 57*   CREATININE 0.8   MG 1.7       Recent Labs   Lab 05/27/22  0434   WBC 10.55   RBC 2.80*   HGB 8.1*   HCT 26.1*   *   MCV 93   MCH 28.9   MCHC 31.0*         Assessment and Plan:    Azotemia  - slowly improving, continue to hold chlorthalidone at this time    Acute respiratory failure with hypoxia  - Patient admitted with acute hypoxic respiratory failure, she was treated for PNA during recent admission and was initiated on home oxygen after 6 min walk test previous admission.  - CXR with pulmonary consolidation and fever on admission, recently completed levaquin/azithro course for CAP.   - Completed Cefepime course.  - Continue scheduled DuoNebs  - 5/9: Pulmonary consulted given increasing O2 requirements.   - S/p Bronch 5/11 AM. AFB/KOH staining was negative. Gram stain negative. Remaining cultures/studies NGTD. Bronch cytology with macrophages and inflammation.  - Continue Prednisone taper 60mg x 2wks, then taper down by 10mg every two weeks until complete.  - Given prolonged steroid taper, will continue GI ppx and dapsone for PJP ppx  - Will f/u with pulm outpatient   - 5/25 respiratory status improving, continue weaning efforts     Acute gout of foot  - patient reports difficulty bearing weight on bilat feet due to pain, improving  - x-ray showing soft tissue edema suggestive of gout flare  - uric acid wnl  - allopurinol 100 mg daily for prevention  -   Prednisone taper also to treat  - 5/24 pain and redness  improving     MDS (myelodysplastic syndrome)  - Primary oncologist, Dr. Gita Valdes  [facility]:  - Initially with 5 q minus syndrome and treated with Revlimid. Developed allergy and was then desensitized. Soon after developed pancytopenia and Revlimid was stopped June 2017.    - BMBX on 6/8/17 was with normal cytogenetics. 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 Of 20 metaphases, 5 were normal and 15 had a 5q deletion, NGS positive for SF3B1 and TP53 (12%). 1.4% blasts  - Received vidaza for 22 cycles, received Decitabine for C23 due to national shortage of vidaza and then back to vidaza with C24.  - completed cycle 39 on 4/8/2  - due for cycle 40 on 05/02/2022, postponed whiled admitted and to be readdressed as outpatient   - 5/23 message sent to Oncology team, see HPI for details     Essential hypertension  - At home regimen: lisinopril/hctz and coreg  - Per [facility], Discussed with cardiology who recommended switching HCTZ to Chlorthalidone 25mg daily and adding Nifedipine XL 60mg with outpatient follow up. Nifedipine was held d/t hypotension and to allow for some permissible hypertension given clinical status  -  Continue Current anti-HTN regimen: Lisinopril 40mg daily, Coreg 25mg BID, Chlorthalidone 25mg daily. Nifedipine XL 60mg on  - Will need cardiology f/u outpatient  - 5/23 reduce lisinopril to 20mg daily, BP low to normal      Pancytopenia due to antineoplastic chemotherapy  - Transfuse for hgb < 7 or plts < 10  - Continue acyclovir  ppx  - Cefepime and fluconazole d/c'd 5/12 given normal WBC and afebrile   -  Monitor twice weekly CBCs     CAP (community acquired pneumonia)  - Pt with 2 weeks of productive cough, nasal congestion  - Upon arrival to the hospial she was hypoxic requiring 3L NC, weaned to 2L today  - CXR with likely develop consolidation  - CTA shows New patchy consolidative opacities in the in the right upper and posterior right lower lobes.  Findings suggestive infectious etiology such as pneumonia, multifocal pneumonia.  Also consider atypical given patient's reported immunocompromised status.  Aspiration could present similarly.  Consider continued follow-up after acute clinical illness has resolved to ensure resolution. New mediastinal and bilateral hilar lymphadenopathy, likely reactive.  - completed azithromycin and 7 day course of Levaquin on 4/30.  - CXR on admission showing possible developing consolidation: Cefepime 4/30-5/3    - See acute respiratory failure with hypoxia above     Controlled type 2 diabetes mellitus without complication, without long-term current use of insulin  - Will hold home po diabetic medications  -   Accu-Cheks AC/HS, diabetic diet  - Goal bg 140-180  -  Continue on low-dose sliding scale insulin    Insomnia  - Continue nightly melatonin 9 mg qHS, remeron 7.5 mg qHS.       Adjustment disorder with mixed anxiety and depressed mood  - continue home celexa 20 mg daily     CAD (coronary artery disease)  - S/P left carotid endarterectomy prior to cancer dx, s/p PCI (3 stents) >20 years ago   - Continue on ASA 81mg daily, platelets are wnl  - On repatha as outpatient (statin intolerant)    Debility   - Continue with PT/OT for gait training and strengthening and restoration of ADL's   - Encourage mobility, OOB in chair, and early ambulation as appropriate  - Fall precautions   - Monitor for bowel and bladder dysfunction  - Monitor for and prevent skin breakdown and pressure ulcers  - Continue DVT  prophylaxis with  frequent ambulation         Anticipate disposition:  Nursing home, PPD and chest x-ray ordered      Follow-up needed during SNF stay- Dr. Valdes 6/2    Appointments not to send patient to- 5/30, 5/31    Follow-up needed after discharge from SNF:   - PCP, hospital follow-up, needs to be scheduled  - Oncology, scheduled for after SNF discharge    Future Appointments   Date Time Provider Department Center   5/30/2022 10:30 AM [facility] BMT [facility]   5/31/2022 11:00 AM NURSE 11, [facility] CHEMO [facility]   6/2/2022 12:30 PM [facility]   6/2/2022  1:30 PM Gita Valdes MD [facility] BMT [facility]   6/3/2022 11:00 AM NURSE 11, [facility]         Fidelina Maxwell NP  Department of Hospital Medicine   [facility]     DOS: 5/27/2022       Patient note was created using MModal Dictation.  Any errors in syntax or even information may not have been identified and edited on initial review prior to signing this note.

## 2022-05-27 NOTE — NURSING
TB skin test administered to pt's R forearm. Skin test site circled. To be read in 48 and 72 hrs. 48 hrs Sunday @ 1820 and 72 hrs Monday 1820. Tolerated well.

## 2022-05-27 NOTE — PT/OT/SLP PROGRESS
"Occupational Therapy   Treatment    Name: Susan Puente  MRN: [id number]  Admit Date: 5/19/2022  Admitting Diagnosis:  Acute respiratory failure with hypoxia    General Precautions: Standard, fall   Orthopedic Precautions:N/A   Braces:       Recommendations:     Discharge Recommendations: home with home health  Level of Assistance Recommended at Discharge: Intermittent assistance for ADL's and homemaking tasks  Discharge Equipment Recommendations:  bedside commode, shower chair, walker, rolling  Barriers to discharge:  Inaccessible home environment, Decreased caregiver support    Assessment:     Susan Puente is a 71 y.o. female with a medical diagnosis of Acute respiratory failure with hypoxia . Performance deficits affecting function are impaired endurance, impaired self care skills, impaired functional mobilty, gait instability, impaired balance, decreased lower extremity function, decreased coordination, decreased safety awareness, impaired cardiopulmonary response to activity, decreased ROM. Pt. participated well with session on this day. Pt is progressing well with session on this day still continues to requires cues with aspects of safety . Pt. Will continue to benefit from continued OT to progress towards goals    Rehab Potential is good    Activity tolerance:  Good    Plan:     Patient to be seen 6 x/week to address the above listed problems via self-care/home management, therapeutic activities, therapeutic exercises    · Plan of Care Expires: 06/20/22  · Plan of Care Reviewed with: patient    Subjective     Communicated with: neri  prior to session. "Im ready now"    Pain/Comfort:  Pain Rating 1: 3/10  Location - Side 1: Bilateral  Location - Orientation 1: generalized  Location 1: shoulder  Pain Addressed 1: Distraction  Pain Rating Post-Intervention 1: 3/10    Patient's cultural, spiritual, Amish conflicts given the current situation:  no    Objective:     Patient found up in chair upon OT " entry to room.    Functional Mobility/Transfers:  · Patient completed Sit <> Stand Transfer with stand by assistance  with  rolling walker     Activities of Daily Living:  · Not tested    [facility] 6 Click ADL: 21    OT Exercises: UE Ergometer 10 mins with moderate resistance    Treatment & Education:  Pt. With standing act on this day with task. Pt. With CGA/SBA for balance aspects with task with  AD at raised counter Pt with visual perception task with discrimination of various shapes and sizes x 8 min with standing bal and min cues throught out. Pt. able to complete finish task with no seated rest break required.    Pt. With 3# dowel activity with 2x20 reps with  shd flex, bicep curls horz adb/add and forward flex motion to increase BUE ROM and strength,.     Pt. With standing and therex performed to increase ROM, endurance selfcare task and fxl mobility for independence     Patient left up in chair with PT presentEducation:   in gym    GOALS:   Multidisciplinary Problems     Occupational Therapy Goals        Problem: Occupational Therapy    Goal Priority Disciplines Outcome Interventions   Occupational Therapy Goal     OT, PT/OT Ongoing, Progressing    Description: Goals to be met by: 6/3/22    Patient will increase functional independence with ADLs by performing:    UE Dressing with [place]. = MET  LE Dressing with Modified [place].  Grooming while standing at sink with Supervision.  Toileting from toilet with Supervision Assistance for hygiene and clothing management.   Bathing from  shower chair/bench with Supervision.  Step transfer with Supervision using RW.   Toilet transfer to toilet with Supervision.  Upper extremity exercise program with supervision.                         Time Tracking:     OT Date of Treatment: 05/27/22         Billable Minutes:Therapeutic Exercise 34    5/27/2022   OT and MARLOW have discussed the above patients goals and status in collaboration with Plan of Care.

## 2022-05-27 NOTE — PROGRESS NOTES
[facility] - Skilled Nursing  Adult Nutrition  Progress Note    SUMMARY   Recommendations  Continue diabetic diet, boost glucose daily  Nutrition Goal Status: progressing towards goal      Assessment and Plan    Moderate malnutrition    Moderate/Severe Protein-Calorie Malnutrition  Malnutrition in the context of Chronic Illness/Injury     Related to (etiology):  Decline in ADL's, taste changes     Signs and Symptoms (as evidenced by):  Energy Intake: <75% of estimated energy requirement for > one month  Body Fat Depletion: mild and moderate depletion of orbital's, triceps and thoracic and lumbar region   Muscle Mass Depletion: mild and moderate depletion of temples, clavicle region, interosseous muscle and lower extremities   Fluid Accumulation: moderate     Interventions(treatment strategy):  Carbohydrate modified diet - 2000 calories  Nutrition education 5/19  Commercial beverage(calories and protein) boost glucose Daily  Collaboration with other providers     Nutrition Diagnosis Status:  Continues               Malnutrition Assessment  5/19/22  Skin (Micronutrient): dry, pallor  Hair/Scalp (Micronutrient): dry  Teeth (Micronutrient): broken dentition  Neck/Chest (Micronutrient): muscle wasting  Musculoskeletal/Lower Extremities: muscle wasting       Energy Intake (Malnutrition): less than 75% for greater than or equal to 1 month   Orbital Region (Subcutaneous Fat Loss): moderate depletion  Upper Arm Region (Subcutaneous Fat Loss): mild depletion  Thoracic and Lumbar Region: well nourished   Hinduism Region (Muscle Loss): moderate depletion  Clavicle Bone Region (Muscle Loss): moderate depletion  Clavicle and Acromion Bone Region (Muscle Loss): moderate depletion  Dorsal Hand (Muscle Loss): moderate depletion  Patellar Region (Muscle Loss): moderate depletion  Anterior Thigh Region (Muscle Loss): moderate depletion  Posterior Calf Region (Muscle Loss): moderate depletion                         Reason for  "Assessment    Reason For Assessment: RD follow-up  Interdisciplinary Rounds: did not attend  General Information Comments: PO 75%, less pain now, reviewed wound care notes, encouraged PO intake, glucose controlled  Nutrition Discharge Planning: Dc on diabetic diet with diabetic ONS of choice    Nutrition/Diet History    Spiritual, Cultural Beliefs, Presybeterian Practices, Values that Affect Care: no    Anthropometrics    Temp: 97.8 °F (36.6 °C)  Height: 5' 5" (165.1 cm)  Height (inches): 65 in  Weight Method: Standard Scale  Weight: 66.2 kg (145 lb 15.1 oz)  Weight (lb): 145.95 lb  Ideal Body Weight (IBW), Female: 125 lb  % Ideal Body Weight, Female (lb): 116.94 %  BMI (Calculated): 24.3     Lab/Procedures/Meds    Pertinent Labs Reviewed: reviewed  Pertinent Labs Comments: Hg 8.1, HCt 26.1, BUN 57,  Pertinent Medications Reviewed: reviewed     Estimated/Assessed Needs    Weight Used For Calorie Calculations: 73.5 kg (162 lb 0.6 oz)  Energy Calorie Requirements (kcal): 9436-2917  Energy Need Method: Kcal/kg  Protein Requirements: 81g  Weight Used For Protein Calculations: 73.5 kg (162 lb 0.6 oz)  Fluid Requirements (mL): 1837 or per MD  Estimated Fluid Requirement Method: RDA Method  RDA Method (mL): 1837  CHO Requirement: 229g      Nutrition Prescription Ordered    Current Diet Order: 2000 diabetic  Oral Nutrition Supplement: Boost gluose chocolate daily    Evaluation of Received Nutrient/Fluid Intake    I/O: -810  Energy Calories Required: meeting needs  Protein Required: meeting needs  Fluid Required: meeting needs  Comments: LBM 5/27  Tolerance: tolerating  % Intake of Estimated Energy Needs: 75 - 100 %  % Meal Intake: 75 - 100 %    Nutrition Risk    Level of Risk/Frequency of Follow-up: low (one time per week)     Monitor and Evaluation    Food and Nutrient Adminstration: diet order  Knowledge/Beliefs/Attitudes: food and nutrition knowledge/skill  Physical Activity and Function: nutrition-related ADLs and " IADLs  Anthropometric Measurements: weight change  Biochemical Data, Medical Tests and Procedures: electrolyte and renal panel, gastrointestinal profile, glucose/endocrine profile, inflammatory profile     Nutrition Follow-Up    RD Follow-up?: Yes

## 2022-05-28 NOTE — PT/OT/SLP PROGRESS
Physical Therapy Treatment    Patient Name:  Susan Puente   MRN:  [id number]  Admit Date: 5/19/2022  Admitting Diagnosis: Acute respiratory failure with hypoxia  Recent Surgeries: N/A    General Precautions: Standard, fall   Orthopedic Precautions:N/A   Braces: N/A     Recommendations:     Discharge Recommendations:  home with home health   Level of Assistance Recommended at Discharge: Intermittent assistance   Discharge Equipment Recommendations: bedside commode, walker, rolling   Barriers to discharge: Inaccessible home environment, Decreased caregiver support    Assessment:     Susan Puente is a 71 y.o. female admitted with a medical diagnosis of Acute respiratory failure with hypoxia. Pt tolerated therapy session well with focus on increasing cardio endurance, gait training, transfers, curb and stair training in order to assist with achieving highest level of function upon d/c. Pt would continue to benefit from skilled PT services per POC.       Performance deficits affecting function:  weakness, impaired endurance, impaired self care skills, impaired functional mobilty, gait instability, impaired balance, impaired cardiopulmonary response to activity .    Rehab Potential is excellent and good    Activity Tolerance: Good and Fair    Plan:     Patient to be seen 6 x/week to address the above listed problems via gait training, therapeutic activities, therapeutic exercises, neuromuscular re-education, wheelchair management/training    · Plan of Care Expires: 06/19/22  · Plan of Care Reviewed with: patient    Subjective     Pt agreeable to PT.     Pain/Comfort:  · Pain Rating 1: 0/10  · Pain Rating Post-Intervention 1: 0/10    Patient's cultural, spiritual, Baptist conflicts given the current situation:  · no    Objective:   Patient found up in chair with oxygen upon PT entry to room.     Functional Mobility:  · Transfers:     · Sit to Stand: 2 sets, stand by assistance with rolling walker  · Gait: x  "135', RW, SBA/CGA and follow with w/c for fatigue.   · Stairs:  Pt ascended/descended 7 stair(s), 2 sets and 4" curb step with Rolling Walker with right handrail with Contact Guard Assistance.   · Wheelchair Propulsion:  Pt propelled Standard wheelchair x 85 feet on Level tile with  Bilateral upper extremity with Modified Independent.     AM-PAC 6 CLICK MOBILITY  20    Patient left up in chair with call button in reach.    GOALS:   Multidisciplinary Problems     Physical Therapy Goals        Problem: Physical Therapy    Goal Priority Disciplines Outcome Goal Variances Interventions   Physical Therapy Goal     PT, PT/OT Ongoing, Progressing     Description: PT goals until 6/6/22    1. Pt sit to stand with RW with supervision-not met  2. Pt to perform gait 200ft with RW with supervision.-not met  3. Pt to go up/down curb step with RW with SBA.-not met  4. Pt to up/down 12 steps with R UE rail with no AD with CGA.-not met  5. Pt to perform B LE exs in sitting or supine x 15 reps to strengthen B LE to improve functional mobility.-not met  6. Pt to propel w/c 100ft with B UE on level surface with mod independent- Met 5/27/2022  7. Pt to  object off the floor with reacher with RW support with set up assist-not met  8. Pt to ambulated 10ft over uneven surface with Rw with supervision-not met                     Time Tracking:     PT Received On: 05/28/22  PT Start Time: 1410  PT Stop Time: 1452  PT Total Time (min): 42 min    Billable Minutes: Gait Training 30 and Therapeutic Activity 12    Treatment Type: Treatment  PT/PTA: PTA     PTA Visit Number: 1     05/28/2022  "

## 2022-05-30 NOTE — PT/OT/SLP PROGRESS
Occupational Therapy   Treatment    Name: Susan Puente  MRN: [id number]  Admit Date: 5/19/2022  Admitting Diagnosis:  Acute respiratory failure with hypoxia    General Precautions: Standard, fall   Orthopedic Precautions:N/A   Braces: N/A     Recommendations:     Discharge Recommendations: home with home health  Level of Assistance Recommended at Discharge: Intermittent assistance for ADL's and homemaking tasks  Discharge Equipment Recommendations:  bedside commode, walker, rolling  Barriers to discharge:  Decreased caregiver support    Assessment:     Susan Puente is a 71 y.o. female with a medical diagnosis of Acute respiratory failure with hypoxia and presents with decreased endurance and higher level ADL/IADL tasks. .  She presents with good motivation on this date and participated well. Performance deficits affecting function are weakness, impaired endurance, impaired self care skills, impaired functional mobilty, gait instability, impaired cardiopulmonary response to activity.     Rehab Potential is good    Activity tolerance:  Good    Plan:     Patient to be seen 6 x/week to address the above listed problems via self-care/home management, therapeutic activities, therapeutic exercises    · Plan of Care Expires: 06/20/22  · Plan of Care Reviewed with: patient    Subjective     Communicated with: nurse prior to session. Pt. Agreeable to session .    Pain/Comfort:  · Pain Rating 1: 0/10  · Pain Rating Post-Intervention 1: 0/10    Patient's cultural, spiritual, Tenriism conflicts given the current situation:  · no    Objective:     Patient found up in chair with  (no lines) upon OT entry to room.    Bed Mobility:    · Not tested     Functional Mobility/Transfers:  · Patient completed Sit <> Stand Transfer with stand by assistance  with  rolling walker   · Functional Mobility: Pt. Ambulated ~80 feet with RW on this date and CGA    Activities of Daily Living:  · Not performed    [facility] 6 Click ADL:  21    OT Exercises: AROM with 1 # dowel x 2 sets 10 reps for all major planes of BUE motion  UE Ergometer x 10 minutes with min resistance   Pt. Performed gentle stretches for cervical rotation and flexion as well as shoulder abd and ER.      Treatment & Education:  Pt. Engaged in TA of standing at counter to remove items from overhead cabinet with SBA required for task completion    Patient left up in chair with all lines intact and call button in reachEducation:      GOALS:   Multidisciplinary Problems     Occupational Therapy Goals        Problem: Occupational Therapy    Goal Priority Disciplines Outcome Interventions   Occupational Therapy Goal     OT, PT/OT Ongoing, Progressing    Description: Goals to be met by: 6/3/22    Patient will increase functional independence with ADLs by performing:    UE Dressing with [place]. = MET  LE Dressing with Modified [place].  Grooming while standing at sink with Supervision.  Toileting from toilet with Supervision Assistance for hygiene and clothing management.   Bathing from  shower chair/bench with Supervision.  Step transfer with Supervision using RW.   Toilet transfer to toilet with Supervision.  Upper extremity exercise program with supervision.                         Time Tracking:     OT Date of Treatment: 05/30/22  OT Start Time: 1407    OT Stop Time: 1446  OT Total Time (min): 39 min    Billable Minutes:Therapeutic Activity 10  Therapeutic Exercise 29    5/30/2022

## 2022-05-30 NOTE — PT/OT/SLP PROGRESS
Physical Therapy Treatment    Patient Name:  Susan Puente   MRN:  [id number]  Admit Date: 5/19/2022  Admitting Diagnosis: Acute respiratory failure with hypoxia  Recent Surgeries: N/A    General Precautions: Standard, fall   Orthopedic Precautions:N/A   Braces: N/A     Recommendations:     Discharge Recommendations:  home with home health   Level of Assistance Recommended at Discharge: Intermittent assistance   Discharge Equipment Recommendations: bedside commode, walker, rolling   Barriers to discharge: Inaccessible home environment, Decreased caregiver support    Assessment:     Susan Puente is a 71 y.o. female admitted with a medical diagnosis of Acute respiratory failure with hypoxia. Pt tolerated therapy session well with focus on gait training, stair/curb training, LE strengthening and cardio endurance in order to assist with achieving highest level of independence. Pt would continue to benefit from skilled PT services per POC.     Performance deficits affecting function:  weakness, impaired endurance, impaired self care skills, impaired functional mobilty, gait instability, impaired balance, impaired cardiopulmonary response to activity .    Rehab Potential is excellent and good    Activity Tolerance: Good    Plan:     Patient to be seen 6 x/week to address the above listed problems via gait training, therapeutic activities, therapeutic exercises, neuromuscular re-education, wheelchair management/training    · Plan of Care Expires: 06/19/22  · Plan of Care Reviewed with: patient    Subjective     Pt agreeable to PT.     Pain/Comfort:  · Pain Rating 1: 0/10  · Pain Rating Post-Intervention 1: 0/10    Patient's cultural, spiritual, Oriental orthodox conflicts given the current situation:  · no    Objective:     Patient found up in chair with  (no lines) upon PT entry to room.     Therapeutic Activities and Exercises: nustep x 15 mins, mod resistance without rest.    Functional Mobility:  · Transfers:    "  · Sit to Stand: 3 sets, stand by assistance with rolling walker. Good safety awareness on locking brakes.  · Gait: x 15', x 20', RW, SBA.   · Stairs: 8 stairs, R Hrail, CGA.  · Curb trainin " curb, 2 sets with RW, CGA  · Wheelchair Propulsion: Pt self propelled w/c using BUEs x 105', Mod I.     AM-PAC 6 CLICK MOBILITY  20    Patient left up in chair with OT present.    GOALS:   Multidisciplinary Problems     Physical Therapy Goals        Problem: Physical Therapy    Goal Priority Disciplines Outcome Goal Variances Interventions   Physical Therapy Goal     PT, PT/OT Ongoing, Progressing     Description: PT goals until 22    1. Pt sit to stand with RW with supervision-not met  2. Pt to perform gait 200ft with RW with supervision.-not met  3. Pt to go up/down curb step with RW with SBA.-not met  4. Pt to up/down 12 steps with R UE rail with no AD with CGA.-not met  5. Pt to perform B LE exs in sitting or supine x 15 reps to strengthen B LE to improve functional mobility.-not met  6. Pt to propel w/c 100ft with B UE on level surface with mod independent- Met 2022  7. Pt to  object off the floor with reacher with RW support with set up assist-not met  8. Pt to ambulated 10ft over uneven surface with Rw with supervision-not met                     Time Tracking:     PT Received On: 22  PT Start Time: 1315  PT Stop Time: 1403  PT Total Time (min): 48 min    Billable Minutes: Gait Training 15, Therapeutic Activity 18 and Therapeutic Exercise 15    Treatment Type: Treatment  PT/PTA: PTA     PTA Visit Number: 2     2022  "

## 2022-05-30 NOTE — TELEPHONE ENCOUNTER
Outgoing call to pt about Exjade refill. Pt states she is receiving it in [facility]F. She is due to be discharged on 6/6, but may appeal to stay longer. Asked for call on 6/3 for status check on Exjade.

## 2022-05-31 NOTE — PLAN OF CARE
Problem: Physical Therapy  Goal: Physical Therapy Goal  Description: PT goals until 6/6/22    1. Pt sit to stand with RW with supervision-not met  2. Pt to perform gait 200ft with RW with supervision.-not met  3. Pt to go up/down curb step with RW with SBA.-not met  4. Pt to up/down 12 steps with R UE rail with no AD with CGA.-not met  5. Pt to perform B LE exs in sitting or supine x 15 reps to strengthen B LE to improve functional mobility.-not met  6. Pt to propel w/c 100ft with B UE on level surface with mod independent- Met 5/27/2022  7. Pt to  object off the floor with reacher with RW support with set up assist-not met  8. Pt to ambulated 10ft over uneven surface with Rw with supervision-not met  9. Added 5/31/2022 Ascend/Descend ramp with RW with SBA.    Outcome: Ongoing, Progressing

## 2022-05-31 NOTE — PT/OT/SLP PROGRESS
Occupational Therapy   Treatment    Name: Susan Puente  MRN: [id number]  Admit Date: 5/19/2022  Admitting Diagnosis:  Acute respiratory failure with hypoxia    General Precautions: Standard, fall   Orthopedic Precautions:N/A   Braces: N/A     Recommendations:     Discharge Recommendations: home with home health  Level of Assistance Recommended at Discharge: Intermittent assistance for ADL's and homemaking tasks  Discharge Equipment Recommendations:  bedside commode, walker, rolling  Barriers to discharge:  Decreased caregiver support    Assessment:     Susan Puente is a 71 y.o. female with a medical diagnosis of Acute respiratory failure with hypoxia .  She remains limited in performance of self-care , functional mobility and ADLs and currently not performing tasks at OF . Currently presenting with performance deficits including impaired endurance, impaired self care skills, impaired functional mobilty, gait instability, impaired balance, decreased lower extremity function, decreased coordination, decreased safety awareness, impaired cardiopulmonary response to activity, decreased ROM.  Pt tolerated Tx without incident and is making progress with self care tasks, functional mobility and functional transfers .  She would continue to benefit from OT intervention to further her functional (I)ce and safety.    Rehab Potential is good    Activity tolerance:  Good    Plan:     Patient to be seen 6 x/week to address the above listed problems via self-care/home management, therapeutic activities, therapeutic exercises    · Plan of Care Expires: 06/20/22  · Plan of Care Reviewed with: patient    Subjective     Communicated with: nurse prior to session.     Pain/Comfort:  · Pain Rating 1: 0/10  · Pain Rating Post-Intervention 1: 0/10    Patient's cultural, spiritual, Orthodoxy conflicts given the current situation:  · no    Objective:     Patient found up in chair with  (no lines) upon OT entry to room.    Bed  Mobility:    · Pt seated in W/C at onset of therapy session.    Functional Mobility/Transfers:  · Patient completed Sit <> Stand Transfer with stand by assistance  with  rolling walker   · Patient completed Bed <> Chair Transfer using Step Transfer technique with stand by assistance with rolling walker  Functional Mobility: Patient propelled W/C from her room to therapy gym using (B) UEs a distance of 180 ft with no assist required.      [facility] 6 Click ADL: 21    OT Exercises: UE Ergometer pt performed 13 minutes on UBE with Mod resistance. UE exercises performed to increase functional endurance and strength in order increase independence when performing self care tasks, functional ambulation, W/C propulsion, and functional standing activities .    Pt worked on functional standing activity consisting of standing with RW while reaching in all planes , crossing of midline and reaching to varying heights to facilitate (B) wt shifting and stability in standing in prep for performance of self care tasks and functional ADLS in standing.  Pt tolerated up to 10   Min.9 sec  in standing with (S) and RW to steady.    Treatment & Education:  Pt edu on POC, safety when performing self care tasks , safety when performing functional transfers and mobility.  - White board updated  - Self care tasks completed-- as noted above       Patient left up in chair with call button in reachEducation:      GOALS:   Multidisciplinary Problems     Occupational Therapy Goals        Problem: Occupational Therapy    Goal Priority Disciplines Outcome Interventions   Occupational Therapy Goal     OT, PT/OT Ongoing, Progressing    Description: Goals to be met by: 6/3/22    Patient will increase functional independence with ADLs by performing:    UE Dressing with [place]. = MET  LE Dressing with Modified [place].  Grooming while standing at sink with Supervision.  Toileting from toilet with Supervision Assistance for hygiene and clothing  management.   Bathing from shower chair/bench with Supervision.  Step transfer with Supervision using RW. = MET  Toilet transfer to toilet with Supervision. = MET  Upper extremity exercise program with supervision.                         Time Tracking:     OT Date of Treatment: 05/31/22  OT Start Time: 1420    OT Stop Time: 1500  OT Total Time (min): 40 min    Billable Minutes:Therapeutic Activity 27  Therapeutic Exercise 13    5/31/2022

## 2022-05-31 NOTE — NURSING
Port-a-cath accessed per NP orders using sterile technique.  Patient tolerated well, will continue to monitor.

## 2022-05-31 NOTE — PROGRESS NOTES
[facility]                                  Skilled Nursing Facility                   Progress Note     Admit Date: 5/19/2022  SHABBIR 6/6/2022  Principal Problem:  Acute respiratory failure with hypoxia   HPI obtained from patient interview and chart review     Chief Complaint: Re-evaluation of medical treatment and therapy status: Lab review, continued gout, symptomatic anemia    HPI:    Ms. Puente is a 71- year old patient with PMHx of MDS on treatment with Vidaza (currently receiving decitabine due to national Vidaza shortage; Dr. Valdes pt),  DM2, HTN, CAD, anxiety, and depression  Who presents to SNF following hospitalization for acute respiratory failure with hypoxia.    Interval history:  All of today's labs reviewed and are listed below.  Hemoglobin 7.0 and patient reports increased fatigue.  Patient states oncology would normally give her a unit of blood , will order.  Consent obtained.  24 hr vital sign ranges listed below.  Patient continues to have left foot gout pain with associated redness, will trial patient on low-dose colchicine to see if this will finish clearing up.  Patient also reports left foot unable to flex which is affecting her walking, states her foot feels asleep, patient states she previously took  gabapentin for neuropathy.  Patient denies shortness of breath, abdominal discomfort, nausea, or vomiting.  Patient reports an adequate appetite.  Patient denies dysuria.  Patient reports having regular bowel movements.  Patient progessing with PT/OT- Gait: x 15', x 20', RW, SBA. Continuing to follow and treat all acute and chronic conditions.    Past Medical History: Patient has a past medical history of Allergic rhinitis, Allergy, Anemia, Anxiety, CAD (coronary artery disease), Carotid stenosis, Colon polyps (2016), Controlled type 2 diabetes mellitus without complication, without long-term current use of insulin  (10/19/2016), Depression, GERD (gastroesophageal reflux disease), Hearing loss in right ear, psychiatric care, Hypokalemia (2/3/2020), MDS (myelodysplastic syndrome) with 5q deletion, Psychiatric problem, and Therapy.    Past Surgical History: Patient has a past surgical history that includes Tonsillectomy; Carotid stent; Carotid endarterectomy (Left, 2011); Bunionectomy; Endometrial ablation; Colonoscopy (N/A, 12/20/2016); Tympanostomy tube placement; Bone marrow biopsy (Left, 6/7/2018); Bone marrow biopsy (Left, 3/21/2019); Bone marrow biopsy (Left, 10/24/2019); Insertion of tunneled central venous catheter (CVC) with subcutaneous port (N/A, 2/19/2020); and Bone marrow biopsy (Left, 4/21/2021).    Social History: Patient reports that she quit smoking about 5 years ago. Her smoking use included cigarettes and vaping with nicotine. She has a 75.00 pack-year smoking history. She has never used smokeless tobacco. She reports that she does not drink alcohol and does not use drugs.    Family History: family history includes Alcohol abuse in her brother and father; Cancer in her paternal grandmother; Heart disease in her father and mother; Hyperlipidemia in her mother.    Allergies: Patient is allergic to revlimid [lenalidomide].    ROS  Constitutional: Negative for fever   Eyes: Negative for blurred vision, double vision   Respiratory: Negative for cough, shortness of breath   Cardiovascular: Negative for chest pain, palpitations, and leg swelling.   Gastrointestinal: Negative for abdominal pain, constipation, diarrhea, nausea, vomiting.   Genitourinary: Negative for dysuria, frequency   Musculoskeletal:  + generalized weakness.  Improving pain to right foot  Skin: Negative for itching and rash.   Neurological: Negative for dizziness, headaches.   Psychiatric/Behavioral: Negative for depression. The patient is not nervous/anxious.      24 hour Vital Sign Range   Temp:  [98.1 °F (36.7 °C)-98.3 °F (36.8 °C)]   Pulse:   [81-92]   Resp:  [18]   BP: (140-153)/(65-88)   SpO2:  [96 %-97 %]     PEx  Constitutional: Patient appears debilitated.  No distress noted  HENT:   Head: Normocephalic and atraumatic.   Eyes: Pupils are equal, round  Neck: Normal range of motion. Neck supple.   Cardiovascular: Normal rate, regular rhythm and normal heart sounds.    Pulmonary/Chest: Effort normal and breath sounds are clear    Abdominal: Soft. Bowel sounds are normal.   Musculoskeletal: Normal range of motion.   Neurological: Alert and oriented to person, place, and time.   Psychiatric: Normal mood and affect. Behavior is normal.   Skin: Skin is warm and dry.  Redness to right foot a great toe, improving    Recent Labs   Lab 05/31/22  0511      K 4.6      CO2 29   BUN 42*   CREATININE 0.8   MG 1.6       Recent Labs   Lab 05/31/22  0511   WBC 9.76   RBC 2.46*   HGB 7.0*   HCT 22.5*   *   MCV 92   MCH 28.5   MCHC 31.1*         Assessment and Plan:    Symptomatic anemia  - initiated RBC transfusion, type and screen ordered, blood consent obtained     Acute gout of foot  - patient reports difficulty bearing weight on bilat feet due to pain, improving  - x-ray showing soft tissue edema suggestive of gout flare  - uric acid wnl  - allopurinol 100 mg daily for prevention  -   Prednisone taper also to treat  - 5/31 initiated colchicine 0.6 mg daily    Neuropathy  - initiated home gabapentin 100 mg BID    Seasonal allergies  - initiated cetirizine 10 mg daily    Azotemia  - slowly improving, continue to hold chlorthalidone at this time    Acute respiratory failure with hypoxia  - Patient admitted with acute hypoxic respiratory failure, she was treated for PNA during recent admission and was initiated on home oxygen after 6 min walk test previous admission.  - CXR with pulmonary consolidation and fever on admission, recently completed levaquin/azithro course for CAP.   - Completed Cefepime course.  - Continue scheduled DuoNebs  - 5/9:  Pulmonary consulted given increasing O2 requirements.   - S/p Bronch 5/11 AM. AFB/KOH staining was negative. Gram stain negative. Remaining cultures/studies NGTD. Bronch cytology with macrophages and inflammation.  - Continue Prednisone taper 60mg x 2wks, then taper down by 10mg every two weeks until complete.  - Given prolonged steroid taper, will continue GI ppx and dapsone for PJP ppx  - Will f/u with pulm outpatient   - 5/25 respiratory status improving, continue weaning efforts    MDS (myelodysplastic syndrome)  - Primary oncologist, Dr. Gita Valdes  PER [facility]:  - Initially with 5 q minus syndrome and treated with Revlimid. Developed allergy and was then desensitized. Soon after developed pancytopenia and Revlimid was stopped June 2017.    - BMBX on 6/8/17 was with normal cytogenetics. 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 Of 20 metaphases, 5 were normal and 15 had a 5q deletion, NGS positive for SF3B1 and TP53 (12%). 1.4% blasts  - Received vidaza for 22 cycles, received Decitabine for C23 due to national shortage of vidaza and then back to vidaza with C24.  - completed cycle 39 on 4/8/2  - due for cycle 40 on 05/02/2022, postponed whiled admitted and to be readdressed as outpatient   - 5/23 message sent to Oncology team, see HPI for details     Essential hypertension  - At home regimen: lisinopril/hctz and coreg  - Per [facility], Discussed with cardiology who recommended switching HCTZ to Chlorthalidone 25mg daily and adding Nifedipine  XL 60mg with outpatient follow up. Nifedipine was held d/t hypotension and to allow for some permissible hypertension given clinical status  -  Continue Current anti-HTN regimen: Lisinopril 40mg daily, Coreg 25mg BID, Chlorthalidone 25mg daily. Nifedipine XL 60mg on  - Will need cardiology f/u outpatient  - 5/23 reduce lisinopril to 20mg daily, BP low to normal      Pancytopenia due to antineoplastic chemotherapy  - Transfuse for hgb < 7 or plts < 10  - Continue acyclovir ppx  - Cefepime and fluconazole d/c'd 5/12 given normal WBC and afebrile   -  Monitor twice weekly CBCs     CAP (community acquired pneumonia)  - Pt with 2 weeks of productive cough, nasal congestion  - Upon arrival to the hospial she was hypoxic requiring 3L NC, weaned to 2L today  - CXR with likely develop consolidation  - CTA shows New patchy consolidative opacities in the in the right upper and posterior right lower lobes.  Findings suggestive infectious etiology such as pneumonia, multifocal pneumonia.  Also consider atypical given patient's reported immunocompromised status.  Aspiration could present similarly.  Consider continued follow-up after acute clinical illness has resolved to ensure resolution. New mediastinal and bilateral hilar lymphadenopathy, likely reactive.  - completed azithromycin and 7 day course of Levaquin on 4/30.  - CXR on admission showing possible developing consolidation: Cefepime 4/30-5/3    - See acute respiratory failure with hypoxia above     Controlled type 2 diabetes mellitus without complication, without long-term current use of insulin  - Will hold home po diabetic medications  -   Accu-Cheks AC/HS, diabetic diet  - Goal bg 140-180  -  Continue on low-dose sliding scale insulin    Insomnia  - Continue nightly melatonin 9 mg qHS, remeron 7.5 mg qHS.       Adjustment disorder with mixed anxiety and depressed mood  - continue home celexa 20 mg daily     CAD (coronary artery disease)  - S/P left carotid  endarterectomy prior to cancer dx, s/p PCI (3 stents) >20 years ago   - Continue on ASA 81mg daily, platelets are wnl  - On repatha as outpatient (statin intolerant)    Debility   - Continue with PT/OT for gait training and strengthening and restoration of ADL's   - Encourage mobility, OOB in chair, and early ambulation as appropriate  - Fall precautions   - Monitor for bowel and bladder dysfunction  - Monitor for and prevent skin breakdown and pressure ulcers  - Continue DVT prophylaxis with  frequent ambulation         Anticipate disposition:  Nursing home, PPD and chest x-ray ordered      Follow-up needed during SNF stay- Dr. Valdes 6/2    Appointments not to send patient to- 5/30, 5/31    Follow-up needed after discharge from SNF:   - PCP, hospital follow-up, needs to be scheduled  - Oncology, scheduled for after SNF discharge    Future Appointments   Date Time Provider Department Center   6/2/2022 12:30 PM [facility] LAB BMT NOM LABBMT Fidencio Jones   6/2/2022  1:30 PM Gita Valdes MD [facility] HC BMT F[facility]encio Jones   6/3/2022 11:00 AM NURSE 11, NOM CHEMO NOM CHEMO Fidencio Maxwell NP  Department of Hospital Medicine   [facility]- Skilled Nursing Facility     DOS: 5/31/2022       Patient note was created using MModal Dictation.  Any errors in syntax or even information may not have been identified and edited on initial review prior to signing this note.

## 2022-05-31 NOTE — PLAN OF CARE
Problem: Occupational Therapy  Goal: Occupational Therapy Goal  Description: Goals to be met by: 6/3/22    Patient will increase functional independence with ADLs by performing:    UE Dressing with [place]. = MET  LE Dressing with Modified [place].  Grooming while standing at sink with Supervision.  Toileting from toilet with Supervision Assistance for hygiene and clothing management.   Bathing from shower chair/bench with Supervision.  Step transfer with Supervision using RW. = MET  Toilet transfer to toilet with Supervision. = MET  Upper extremity exercise program with supervision.        Outcome: Ongoing, Progressing

## 2022-05-31 NOTE — PROGRESS NOTES
[facility] - Skilled Nursing  Wound Care    Patient Name:  Susan Puente   MRN:  [id number]  Date: 2022  Diagnosis: Acute respiratory failure with hypoxia    History:     Past Medical History:   Diagnosis Date    Allergic rhinitis     Allergy     Anemia     Anxiety     CAD (coronary artery disease)     Carotid stenosis     Colon polyps     Controlled type 2 diabetes mellitus without complication, without long-term current use of insulin 10/19/2016    Depression     GERD (gastroesophageal reflux disease)     Hearing loss in right ear     Hx of psychiatric care     Celexa, Prozac    Hypokalemia 2/3/2020    MDS (myelodysplastic syndrome) with 5q deletion     Psychiatric problem     Therapy        Social History     Socioeconomic History    Marital status: Single   Tobacco Use    Smoking status: Former Smoker     Packs/day: 1.50     Years: 50.00     Pack years: 75.00     Types: Cigarettes, Vaping with nicotine     Quit date: 3/3/2017     Years since quittin.2    Smokeless tobacco: Never Used   Substance and Sexual Activity    Alcohol use: No    Drug use: No   Other Topics Concern    Patient feels they ought to cut down on drinking/drug use No    Patient annoyed by others criticizing their drinking/drug use No    Patient has felt bad or guilty about drinking/drug use No    Patient has had a drink/used drugs as an eye opener in the AM No   Social History Narrative    Single, never , no children, former , [employer]     Social Determinants of Health     Financial Resource Strain: Low Risk     Difficulty of Paying Living Expenses: Not hard at all   Food Insecurity: No Food Insecurity    Worried About Running Out of Food in the Last Year: Never true    Ran Out of Food in the Last Year: Never true   Transportation Needs: No Transportation Needs    Lack of Transportation (Medical): No    Lack of Transportation (Non-Medical): No   Physical Activity: Inactive    Days of Exercise per  Week: 0 days    Minutes of Exercise per Session: 0 min   Stress: No Stress Concern Present    Feeling of Stress : Only a little   Social Connections: Moderately Isolated    Frequency of Communication with Friends and Family: More than three times a week    Frequency of Social Gatherings with Friends and Family: More than three times a week    Attends [employer] Services: More than 4 times per year    Active Member of Clubs or Organizations: No    Attends Club or Organization Meetings: Never    Marital Status: Never    Housing Stability: Low Risk     Unable to Pay for Housing in the Last Year: No    Number of Places Lived in the Last Year: 1    Unstable Housing in the Last Year: No       Precautions:     Allergies as of 05/19/2022 - Reviewed 05/19/2022   Allergen Reaction Noted    Revlimid [lenalidomide] Swelling 06/08/2017       WOC Assessment Details/Treatment   Wound care follow-up  The upper left coccyx has a partial thickness skin loss that is resolving.  Ms. Puente is able to ambulate to the restroom, continent of urine/feces.  She is able to move independent in bed and performs wound care per self. Etiology is IAD/friction- resolving    Plan:  Coccyx- continue Triad ointment BID/prn cleansing    Nursing to continue care, pressure prevention measures  Wound care will follow-up prn   Recommendations made to primary team for above plan per written report . Orders in place.      05/31/22 0750        Altered Skin Integrity 05/25/22 0750 Left Coccyx Incontinence associated dermatitis   Date First Assessed/Time First Assessed: 05/25/22 0750   Altered Skin Integrity Present on Admission: yes  Side: Left  Location: Coccyx  Primary Wound Type: Incontinence associated dermatitis   Wound Image    Dressing Appearance Open to air   Drainage Amount None   Drainage Characteristics/Odor No odor   Appearance Pink;Moist   Tissue loss description Partial thickness   Periwound Area Intact;Dry;Scar tissue   Wound Edges Open    Wound Length (cm) 0.6 cm   Wound Width (cm) 0.2 cm   Wound Depth (cm) 0.2 cm   Wound Volume (cm^3) 0.024 cm^3   Wound Surface Area (cm^2) 0.12 cm^2   Care Cleansed with:;Soap and water;Applied:;Skin Barrier     05/31/2022

## 2022-05-31 NOTE — NURSING
Spoke with blood bank.  Patient's blood being requested from [facility] due to antibodies.  [facility]

## 2022-05-31 NOTE — PT/OT/SLP PROGRESS
Physical Therapy Treatment    Patient Name:  Susan Puente   MRN:  [id number]  Admit Date: 5/19/2022  Admitting Diagnosis: Acute respiratory failure with hypoxia  Recent Surgeries: N/A    General Precautions: Standard, fall   Orthopedic Precautions:N/A   Braces: N/A     Recommendations:     Discharge Recommendations:  home with home health   Level of Assistance Recommended at Discharge: Intermittent assistance   Discharge Equipment Recommendations: bedside commode, walker, rolling   Barriers to discharge: Inaccessible home environment, Decreased caregiver support    Assessment:     Susan Puente is a 71 y.o. female admitted with a medical diagnosis of Acute respiratory failure with hypoxia. Pt tolerated therapy session well with focus on increasing independence with gait, transfers, safety awareness in order to assist with achieving highest level of function upon d/c from PT. Pt would continue to benefit from skilled PT services per POC.       Performance deficits affecting function:  weakness, impaired endurance, impaired self care skills, impaired functional mobilty, gait instability, impaired balance, impaired cardiopulmonary response to activity .    Rehab Potential is excellent and good    Activity Tolerance: Good    Plan:     Patient to be seen 6 x/week to address the above listed problems via gait training, therapeutic activities, therapeutic exercises, neuromuscular re-education, wheelchair management/training    · Plan of Care Expires: 06/19/22  · Plan of Care Reviewed with: patient    Subjective     Pt agreeable to PT.     Pain/Comfort:  · Pain Rating 1: 0/10  · Pain Rating Post-Intervention 1: 0/10    Patient's cultural, spiritual, Catholic conflicts given the current situation:  · no    Objective:     Patient found up in chair with  (no lines) finishing OT upon entry to gym.      Functional Mobility:  · Bed Mobility:     · Sit to Supine: modified independence  · Transfers:     · Sit to Stand:  "3 sets, stand by assistance with 4 wheeled walker. Instruction on locking brakes.  · Sitting on rollator seat to standing at rollator: SBA, education on locking brakes/unlocking brakes at appropriate time.   · Gait: x 200', x 100' and short distances within gym, SBA using 4WW/Rollator. Pt education on parking AD against wall to increase safety when sitting in rollator chair, as well as instructed on locking brakes appropriately when using. X 20', uneven surface/blue mat using rollator at CGA.   · Stairs:  Pt ascended/descended 4" curb step with 4 wheeled walker with no handrails with Contact Guard Assistance.   · Pt requires reminders on safety awareness.     AM-PAC 6 CLICK MOBILITY  20    Patient left HOB elevated with call button in reach.    GOALS:   Multidisciplinary Problems     Physical Therapy Goals        Problem: Physical Therapy    Goal Priority Disciplines Outcome Goal Variances Interventions   Physical Therapy Goal     PT, PT/OT Ongoing, Progressing     Description: PT goals until 6/6/22    1. Pt sit to stand with RW with supervision-not met  2. Pt to perform gait 200ft with RW with supervision.-not met  3. Pt to go up/down curb step with RW with SBA.-not met  4. Pt to up/down 12 steps with R UE rail with no AD with CGA.-not met  5. Pt to perform B LE exs in sitting or supine x 15 reps to strengthen B LE to improve functional mobility.-not met  6. Pt to propel w/c 100ft with B UE on level surface with mod independent- Met 5/27/2022  7. Pt to  object off the floor with reacher with RW support with set up assist-not met  8. Pt to ambulated 10ft over uneven surface with Rw with supervision-not met  9. Added 5/31/2022 Ascend/Descend ramp with RW with SBA.                     Time Tracking:     PT Received On: 05/31/22  PT Start Time: 1455  PT Stop Time: 1540  PT Total Time (min): 45 min    Billable Minutes: Gait Training 30 and Therapeutic Activity 15    Treatment Type: Treatment  PT/PTA: PTA     PTA " Visit Number: 3     05/31/2022

## 2022-05-31 NOTE — PLAN OF CARE
Patient informed nursing that she has changed her mind regarding nursing placement would like to go home to clear up her house and sell it.  However patient's neighbor and her  have contacted  to say that pt does not have appropriate care at home.  They are unable to provide care she needs, they cannot get her to and from her appointments for infusions or chemo, and that her home is in a reverse mortgage so the bank will take possession and sell it for her.   will speak with patient as well as physical and occupational therapy team.    Jana Gonzales, [facility]  Case Management Department  [facility]  [email or web address]      Addendum: ADELINE met with patient, she is back on board for placement in [facility]. ADELINE is sending progress notes and starting LOCET/PASRR

## 2022-05-31 NOTE — PLAN OF CARE
and Cain Matthews of assisted living  is spoke to patient regarding placement in assisted living or nursing care.  Patient was excited and determined to go to nursing care at [facility].   will facilitate placement and assist with any needs.    Jana Gonzales, [facility]  Case Management Department  [facility]  [email or web address]

## 2022-06-01 NOTE — NURSING
0241- Patient awake and alert. Resting in bed. Aware of orders to transfuse 1u RBC, consent noted on chart. /60, HR 75, Resp 18 Temp 97.6, O2 sat 95%. NAD noted. Patient made aware of s/s of reaction. Patient instructed to notify Nurse of any change in status. Verbalizes understanding. RBCs infusing well. Will continue to assess.     0256- Patient resting in bed. No s/s of reaction noted. /60, HR 64, Temp 98.2, O2 sat 95%, Resp 16. RBCs infusing well. Will monitor.    0711- Patient resting in bed. No distress noted. No s/s of reaction noted. Transfusion complete at this time. /63, HR 69, Resp 16, Temp 98.2, O2 Sat 95%. Tolerated well.

## 2022-06-01 NOTE — PT/OT/SLP PROGRESS
Physical Therapy Treatment    Patient Name:  Susan Puente   MRN:  [id number]  Admit Date: 5/19/2022  Admitting Diagnosis: Acute respiratory failure with hypoxia  Recent Surgeries: N/A    General Precautions: Standard, fall   Orthopedic Precautions:N/A   Braces: N/A     Recommendations:     Discharge Recommendations:  home with home health   Level of Assistance Recommended at Discharge: Intermittent assistance   Discharge Equipment Recommendations: bedside commode, walker, rolling   Barriers to discharge: Inaccessible home environment, Decreased caregiver support    Assessment:     Susan Puente is a 71 y.o. female admitted with a medical diagnosis of Acute respiratory failure with hypoxia. Pt tolerated therapy session well with focus on gait training using rollator, toilet transfers/transfers, standing balance and safety awareness in order to assist with achieving highest level of function upon d/c from PT. Pt does require extra time to perform therapy due to difficulty maintaining focused on task at hand and discussing setup for when patient discharges from therapy with SW, as well. Pt would continue to benefit from skilled PT services per POC.       Performance deficits affecting function:  weakness, impaired endurance, impaired self care skills, impaired functional mobilty, gait instability, impaired balance, impaired cardiopulmonary response to activity .    Rehab Potential is excellent and good    Activity Tolerance: Good    Plan:     Patient to be seen 6 x/week to address the above listed problems via gait training, therapeutic activities, therapeutic exercises, neuromuscular re-education, wheelchair management/training    · Plan of Care Expires: 06/19/22  · Plan of Care Reviewed with: patient    Subjective     Pt agreeable to PT.     Pain/Comfort:  · Pain Rating 1: 0/10  · Pain Rating Post-Intervention 1: 0/10    Patient's cultural, spiritual, Islam conflicts given the current  situation:  · no    Objective:     Communicated with PCT prior to session.  Patient found sitting edge of bed with  (no lines) upon PT entry to room.     Functional Mobility:  · Transfers:     · Sit to Stand: 5 sets, stand by assistance with 4 wheeled walker  · Toilet Transfer: stand by assistance with  4 wheeled walker  using  Step Transfer  · Gait: within short distances in patients room and x 115', SBA using rollator.  · Balance: standing dyn in several bouts at grab bar, SBA/SPV, including: don/doff pants/brief, washing face at sink with UE support on sink.  · Extra time spent discussing with patient about pt's setup upon d/c and on safety awareness.     AM-PAC 6 CLICK MOBILITY  20    Patient left up in chair with  present in activity room.    GOALS:   Multidisciplinary Problems     Physical Therapy Goals        Problem: Physical Therapy    Goal Priority Disciplines Outcome Goal Variances Interventions   Physical Therapy Goal     PT, PT/OT Ongoing, Progressing     Description: PT goals until 6/6/22    1. Pt sit to stand with RW with supervision-not met  2. Pt to perform gait 200ft with RW with supervision.-not met  Updated 6/1/2022 Pt to perform gait 200ft with RW/rollator with supervision  3. Pt to go up/down curb step with RW with SBA.-not met  4. Pt to up/down 12 steps with R UE rail with no AD with CGA.-not met  5. Pt to perform B LE exs in sitting or supine x 15 reps to strengthen B LE to improve functional mobility.-not met  6. Pt to propel w/c 100ft with B UE on level surface with mod independent- Met 5/27/2022  7. Pt to  object off the floor with reacher with RW support with set up assist-not met  8. Pt to ambulated 10ft over uneven surface with Rw with supervision-not met  9. Added 5/31/2022 Ascend/Descend ramp with RW with SBA.                     Time Tracking:     PT Received On: 06/01/22  PT Start Time: 0939  PT Stop Time: 1053  PT Total Time (min): 74 min    Billable  Minutes: Gait Training 20, Therapeutic Activity 38 and Neuromuscular Re-education 16    Treatment Type: Treatment  PT/PTA: PTA     PTA Visit Number: 4 06/01/2022

## 2022-06-01 NOTE — PLAN OF CARE
Problem: Physical Therapy  Goal: Physical Therapy Goal  Description: PT goals until 6/6/22    1. Pt sit to stand with RW with supervision-not met  2. Pt to perform gait 200ft with RW with supervision.-not met  Updated 6/1/2022 Pt to perform gait 200ft with RW/rollator with supervision  3. Pt to go up/down curb step with RW with SBA.-not met  4. Pt to up/down 12 steps with R UE rail with no AD with CGA.-not met  5. Pt to perform B LE exs in sitting or supine x 15 reps to strengthen B LE to improve functional mobility.-not met  6. Pt to propel w/c 100ft with B UE on level surface with mod independent- Met 5/27/2022  7. Pt to  object off the floor with reacher with RW support with set up assist-not met  8. Pt to ambulated 10ft over uneven surface with Rw with supervision-not met  9. Added 5/31/2022 Ascend/Descend ramp with RW with SBA.    Outcome: Ongoing, Progressing

## 2022-06-01 NOTE — PT/OT/SLP PROGRESS
"Occupational Therapy   Treatment    Name: Susan Puente  MRN: [id number]  Admit Date: 5/19/2022  Admitting Diagnosis:  Acute respiratory failure with hypoxia    General Precautions: Standard, fall   Orthopedic Precautions:N/A   Braces:       Recommendations:     Discharge Recommendations: home with home health  Level of Assistance Recommended at Discharge: Intermittent assistance for ADL's and homemaking tasks  Discharge Equipment Recommendations:  bedside commode, walker, rolling  Barriers to discharge:  Decreased caregiver support    Assessment:     Susan Puente is a 71 y.o. female with a medical diagnosis of Acute respiratory failure with hypoxia . Performance deficits affecting function are impaired endurance, impaired self care skills, impaired functional mobilty, gait instability, impaired balance, decreased lower extremity function, decreased coordination, decreased safety awareness, impaired cardiopulmonary response to activity, decreased ROM.   . Pt. participated well with session on this day. Pt is progressing well with session on this day still continues to requires cues with aspects of safety . Pt. Will continue to benefit from continued OT to progress towards goals    Rehab Potential is good    Activity tolerance:  Good    Plan:     Patient to be seen 6 x/week to address the above listed problems via self-care/home management, therapeutic activities, therapeutic exercises    · Plan of Care Expires: 06/20/22  · Plan of Care Reviewed with: patient    Subjective     Communicated with: neri prior to session. "I [facility] to be able to walk around the [facility]"    Pain/Comfort:  · Pain Rating 1: 0/10  · Pain Rating Post-Intervention 1: 0/10    Patient's cultural, spiritual, Yarsani conflicts given the current situation:  · no    Objective:     Patient found up in chair  upon OT entry to room.    Bed Mobility:    · Patient completed Supine to Sit with modified independence  · Patient completed " Sit to Supine with modified independence     Functional Mobility/Transfers:  · Patient completed Sit <> Stand Transfer with supervision  with  rolling walker   · Patient completed Bed <> Chair Transfer using Step Transfer technique with supervision with rolling walker  · Patient completed Toilet Transfer Step Transfer technique with stand by assistance with  rolling walker  · Patient completed  Shower Transfer Step Transfer technique with stand by assistance with rolling walker  · Functional Mobility: Pt. With fxl mobility from gym to room with RW and CGA. Pt. Ambulated approx 150ft with NO LOB     Activities of Daily Living:  · Not tested    [facility] 6 Click ADL: 21    OT Exercises: UE Ergometer 15 mins with moderate resistance    Treatment & Education:  Pt. With 2# dowel activity with 2x15 reps with  shd flex, bicep curls horz adb/add and forward flex motion to increase BUE ROM and strength,.     Pt. With standing and therex performed to increase ROM, endurance selfcare task and fxl mobility for independence     Patient left up in chair with all lines intact and call button in reachEducation:      GOALS:   Multidisciplinary Problems     Occupational Therapy Goals        Problem: Occupational Therapy    Goal Priority Disciplines Outcome Interventions   Occupational Therapy Goal     OT, PT/OT Ongoing, Progressing    Description: Goals to be met by: 6/3/22    Patient will increase functional independence with ADLs by performing:    UE Dressing with [place]. = MET  LE Dressing with Modified [place].  Grooming while standing at sink with Supervision.  Toileting from toilet with Supervision Assistance for hygiene and clothing management.   Bathing from shower chair/bench with Supervision.  Step transfer with Supervision using RW. = MET  Toilet transfer to toilet with Supervision. = MET  Upper extremity exercise program with supervision.                         Time Tracking:     OT Date of Treatment: 06/01/22  OT  Start Time: 0109    OT Stop Time: 0159  OT Total Time (min): 50 min    Billable Minutes:Therapeutic Activity 20  Therapeutic Exercise 30    6/1/2022   OT and MARLOW have discussed the above patients goals and status in collaboration with Plan of Care.

## 2022-06-01 NOTE — PLAN OF CARE
Problem: Adult Inpatient Plan of Care  Goal: Plan of Care Review  Outcome: Ongoing, Progressing     Problem: Adult Inpatient Plan of Care  Goal: Plan of Care Review  Outcome: Ongoing, Progressing  Goal: Patient-Specific Goal (Individualized)  Outcome: Ongoing, Progressing     Problem: Diabetes Comorbidity  Goal: Blood Glucose Level Within Targeted Range  Outcome: Ongoing, Progressing     Problem: Adjustment to Illness (Sepsis/Septic Shock)  Goal: Optimal Coping  Outcome: Ongoing, Progressing

## 2022-06-02 NOTE — PROGRESS NOTES
[facility]                                  Skilled Nursing Facility                   Progress Note     Admit Date: 5/19/2022  SHABBIR 6/6/2022  Principal Problem:  Acute respiratory failure with hypoxia   HPI obtained from patient interview and chart review     Chief Complaint: Re-evaluation of medical treatment and therapy status: Continued gout, symptomatic anemia    HPI:    Ms. Puente is a 71- year old patient with PMHx of MDS on treatment with Vidaza (currently receiving decitabine due to national Vidaza shortage; Dr. Valdes pt),  DM2, HTN, CAD, anxiety, and depression  Who presents to SNF following hospitalization for acute respiratory failure with hypoxia.    Interval history:    Patient reports left foot unable to flex which is affecting her walking, foot feels asleep. Left leg and foot pain improved with Gabapentin and colchicine. Patient denies shortness of breath, abdominal discomfort, nausea, or vomiting. Adequate appetite. Having regular bowel movements.  Patient progessing with PT/OT. 24 hour glucose range stable. Reports fatigue improved. Feels good today. Check cbc tomorrow.     Past Medical History: Patient has a past medical history of Allergic rhinitis, Allergy, Anemia, Anxiety, CAD (coronary artery disease), Carotid stenosis, Colon polyps (2016), Controlled type 2 diabetes mellitus without complication, without long-term current use of insulin (10/19/2016), Depression, GERD (gastroesophageal reflux disease), Hearing loss in right ear, psychiatric care, Hypokalemia (2/3/2020), MDS (myelodysplastic syndrome) with 5q deletion, Psychiatric problem, and Therapy.    Past Surgical History: Patient has a past surgical history that includes Tonsillectomy; Carotid stent; Carotid endarterectomy (Left, 2011); Bunionectomy; Endometrial ablation; Colonoscopy (N/A, 12/20/2016); Tympanostomy tube placement; Bone marrow biopsy (Left,  6/7/2018); Bone marrow biopsy (Left, 3/21/2019); Bone marrow biopsy (Left, 10/24/2019); Insertion of tunneled central venous catheter (CVC) with subcutaneous port (N/A, 2/19/2020); and Bone marrow biopsy (Left, 4/21/2021).    Social History: Patient reports that she quit smoking about 5 years ago. Her smoking use included cigarettes and vaping with nicotine. She has a 75.00 pack-year smoking history. She has never used smokeless tobacco. She reports that she does not drink alcohol and does not use drugs.    Family History: family history includes Alcohol abuse in her brother and father; Cancer in her paternal grandmother; Heart disease in her father and mother; Hyperlipidemia in her mother.    Allergies: Patient is allergic to revlimid [lenalidomide].    ROS  Constitutional: Negative for fever   Eyes: Negative for blurred vision, double vision   Respiratory: Negative for cough, shortness of breath   Cardiovascular: Negative for chest pain, palpitations, and leg swelling.   Gastrointestinal: Negative for abdominal pain, constipation, diarrhea, nausea, vomiting.   Genitourinary: Negative for dysuria, frequency   Musculoskeletal:  + generalized weakness.  Improving pain to right foot  Skin: Negative for itching and rash.   Neurological: Negative for dizziness, headaches.   Psychiatric/Behavioral: Negative for depression. The patient is not nervous/anxious.      24 hour Vital Sign Range   Temp:  [98.1 °F (36.7 °C)]   Pulse:  [78-79]   Resp:  [16]   BP: (129-146)/(59-62)   SpO2:  [94 %]     PEx  Constitutional: Patient appears debilitated.  No distress noted  HENT:   Head: Normocephalic and atraumatic.   Eyes: Pupils are equal, round  Neck: Normal range of motion. Neck supple.   Cardiovascular: Normal rate, regular rhythm and normal heart sounds.    Pulmonary/Chest: Effort normal and breath sounds are clear    Abdominal: Soft. Bowel sounds are normal.   Musculoskeletal: Normal range of motion.   Neurological: Alert and  oriented to person, place, and time.   Psychiatric: Normal mood and affect. Behavior is normal.   Skin: Skin is warm and dry.  Redness to right foot a great toe, improving    No results for input(s): GLUCOSE, NA, K, CL, CO2, BUN, CREATININE, MG in the last 24 hours.    Invalid input(s):  CALCIUM    No results for input(s): WBC, RBC, HGB, HCT, PLT, MCV, MCH, MCHC in the last 24 hours.      Assessment and Plan:    Symptomatic anemia  - initiated RBC transfusion, type and screen ordered, blood consent obtained     Acute gout of foot  - patient reports difficulty bearing weight on bilat feet due to pain, improving  - x-ray showing soft tissue edema suggestive of gout flare  - uric acid wnl  - allopurinol 100 mg daily for prevention  -   Prednisone taper also to treat  - 5/31 initiated colchicine 0.6 mg daily  improving    Neuropathy  continue home gabapentin 100 mg BID    Seasonal allergies  continue cetirizine 10 mg daily    Azotemia  - slowly improving, continue to hold chlorthalidone     Acute respiratory failure with hypoxia  - Patient admitted with acute hypoxic respiratory failure, she was treated for PNA during recent admission and was initiated on home oxygen after 6 min walk test previous admission.  - CXR with pulmonary consolidation and fever on admission, recently completed levaquin/azithro course for CAP.   - Completed Cefepime course.  - Continue scheduled DuoNebs  - 5/9: Pulmonary consulted given increasing O2 requirements.   - S/p Bronch 5/11 AM. AFB/KOH staining was negative. Gram stain negative. Remaining cultures/studies NGTD. Bronch cytology with macrophages and inflammation.  - Continue Prednisone taper 60mg x 2wks, then taper down by 10mg every two weeks until complete.  - Given prolonged steroid taper, will continue GI ppx and dapsone for PJP ppx  - Will f/u with pulm outpatient   - 5/25 respiratory status improving, continue weaning efforts    MDS (myelodysplastic syndrome)  - Primary oncologist,  Dr. Gita Valdes  [facility]:  - Initially with 5 q minus syndrome and treated with Revlimid. Developed allergy and was then desensitized. Soon after developed pancytopenia and Revlimid was stopped June 2017.    - BMBX on 6/8/17 was with normal cytogenetics. 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 Of 20 metaphases, 5 were normal and 15 had a 5q deletion, NGS positive for SF3B1 and TP53 (12%). 1.4% blasts  - Received vidaza for 22 cycles, received Decitabine for C23 due to national shortage of vidaza and then back to vidaza with C24.  - completed cycle 39 on 4/8/2  - due for cycle 40 on 05/02/2022, postponed whiled admitted and to be readdressed as outpatient   - 5/23 message sent to Oncology team, see HPI for details     Essential hypertension  - At home regimen: lisinopril/hctz and coreg  - Per [facility], Discussed with cardiology who recommended switching HCTZ to Chlorthalidone 25mg daily and adding Nifedipine XL 60mg with outpatient follow up. Nifedipine was held d/t hypotension and to allow for some permissible hypertension given clinical status  -  Continue Current anti-HTN regimen: Lisinopril 40mg daily, Coreg 25mg BID, Chlorthalidone 25mg daily. Nifedipine XL 60mg on  - Will need cardiology f/u outpatient  BP low to normal, monitor     Pancytopenia due to antineoplastic chemotherapy  - Transfuse for hgb < 7 or plts < 10  - Continue acyclovir ppx  - Cefepime and fluconazole d/c'd 5/12 given normal WBC and afebrile   -  Monitor twice weekly  CBCs     CAP (community acquired pneumonia)  - Pt with 2 weeks of productive cough, nasal congestion  - Upon arrival to the hospial she was hypoxic requiring 3L NC, weaned to 2L today  - CXR with likely develop consolidation  - CTA shows New patchy consolidative opacities in the in the right upper and posterior right lower lobes.  Findings suggestive infectious etiology such as pneumonia, multifocal pneumonia.  Also consider atypical given patient's reported immunocompromised status.  Aspiration could present similarly.  Consider continued follow-up after acute clinical illness has resolved to ensure resolution. New mediastinal and bilateral hilar lymphadenopathy, likely reactive.  - completed azithromycin and 7 day course of Levaquin on 4/30.  - CXR on admission showing possible developing consolidation: Cefepime 4/30-5/3    - See acute respiratory failure with hypoxia above     Controlled type 2 diabetes mellitus without complication, without long-term current use of insulin  - Will hold home po diabetic medications  -   Accu-Cheks AC/HS, diabetic diet  - Goal bg 140-180  -  Continue on low-dose sliding scale insulin    Insomnia  - Continue nightly melatonin 9 mg qHS, remeron 7.5 mg qHS.       Adjustment disorder with mixed anxiety and depressed mood  - continue home celexa 20 mg daily     CAD (coronary artery disease)  - S/P left carotid endarterectomy prior to cancer dx, s/p PCI (3 stents) >20 years ago   - Continue on ASA 81mg daily, platelets are wnl  - On repatha as outpatient (statin intolerant)    Debility   - Continue with PT/OT for gait training and strengthening and restoration of ADL's   - Encourage mobility, OOB in chair, and early ambulation as appropriate  - Fall precautions   - Monitor for bowel and bladder dysfunction  - Monitor for and prevent skin breakdown and pressure ulcers  - Continue DVT prophylaxis with  frequent ambulation         Anticipate disposition:  Nursing home, PPD and chest x-ray  ordered      Follow-up needed during SNF stay- Dr. Valdes 6/2    Appointments not to send patient to- 5/30, 5/31    Follow-up needed after discharge from SNF:   - PCP, hospital follow-up, needs to be scheduled  - Oncology, scheduled for after SNF discharge    Future Appointments   Date Time Provider Department Center   6/2/2022 12:30 PM [facility] LAB BMT [facility] LAB[facility] Fidencio Jones   6/2/2022  1:30 PM Gita Valdes MD [facility] Fidencio Lugo NP  Department of Hospital Medicine   [facility]     DOS: 6/2/2022       Patient note was created using MModal Dictation.  Any errors in syntax or even information may not have been identified and edited on initial review prior to signing this note.

## 2022-06-02 NOTE — PLAN OF CARE
NURSING HOME ORDERS    06/01/2022  [facility]UR[facility]  [address] [facility] [address]  Dept: [phone]  Loc: [phone]     Admit to Nursing Home:   alf bed    Diagnoses:  Active Hospital Problems    Diagnosis  POA    *Acute respiratory failure with hypoxia [J96.01]  Yes    Physical debility [R53.81]  Yes    Acute gout of foot [M10.9]  Yes    Pancytopenia due to antineoplastic chemotherapy [D61.810, T45.1X5A]  Yes    Moderate malnutrition [E44.0]  Yes    Controlled type 2 diabetes mellitus without complication, without long-term current use of insulin [E11.9]  Yes    Insomnia [G47.00]  Yes    MDS (myelodysplastic syndrome) [D46.9]  Yes    Adjustment disorder with mixed anxiety and depressed mood [F43.23]  Yes    MDS (myelodysplastic syndrome) with 5q deletion [D46.C]  Yes    Essential hypertension [I10]  Yes    CAD (coronary artery disease) [I25.10]  Yes      Resolved Hospital Problems   No resolved problems to display.       Code Status: full    Patient is homebound due to:  Acute respiratory failure with hypoxia    Allergies:  Review of patient's allergies indicates:   Allergen Reactions    Revlimid [lenalidomide] Swelling     Facial swelling  6/8/17 - in the process of desensitation       Vitals:  Per facility protocol    Diet:  ADA diabetic diet, 200 calories    Activities:  Activity as tolerated    Labs per facility protocol    Nursing Precautions:  Fall and Pressure ulcer prevention    Consults:   PT to evaluate and treat- 3 times a week, OT to evaluate and treat- 3 times a week, Wound Care and Nutrition to evaluate and recommend diet     Miscellaneous Care: Wound Care: yes:  Pressure Ulcer(s) Stage II :   Location:  Coccyx  - Coccyx/buttocks-   cleanse perineal and affected area with perineal cleanser . Pat dry. Apply thin layer of Triad ointment BID and prn cleansing of incontinent episode. Do not apply cover  dressing.      Diabetes Care: Diabetes: Check blood sugar. Fingerstick blood sugar AC and HS  Sliding Scale/Hypoglycemia Protocol: For FSG<80, give 1 amp D50 or 1 glucose tablet. For FSG , do nothing. For -200, give 1 unit of novolog in addition to pre-meal insulin. For -250, give 2 units of novolog in addition to pre-meal insulin. For -300, give 3 units of novolog in addition to pre-meal insulin. For 301-350, give 4 units of novolog in addition to pre-meal insulin. For 351-400, give 5 units of novolog in addition to pre-meal insulin. For FSG >400, give 5 units of novolog in addition to pre-meal insulin and please call MD                   Diabetes Care:  SN to perform and educate Diabetic management with blood glucose monitoring:, Fingerstick blood sugar AC and HS and Report CBG < 60 or > 350 to physician.      Medications: Discontinue all previous medication orders, if any. See new list below.     Medication List      START taking these medications    gabapentin 100 MG capsule  Commonly known as: NEURONTIN  Take 1 capsule (100 mg total) by mouth 2 (two) times daily.  Start taking on: June 2, 2022        CHANGE how you take these medications    carvediloL 25 MG tablet  Commonly known as: COREG  Take 1 tablet (25 mg total) by mouth 2 (two) times daily with meals.  What changed: Another medication with the same name was removed. Continue taking this medication, and follow the directions you see here.     lisinopriL 20 MG tablet  Commonly known as: PRINIVIL,ZESTRIL  Take 1 tablet (20 mg total) by mouth once daily.  Start taking on: June 2, 2022  What changed:   · medication strength  · how much to take     magnesium oxide 400 mg (241.3 mg magnesium) tablet  Commonly known as: MAG-OX  Take 2 tablets (800 mg total) by mouth 2 (two) times daily.  What changed: Another medication with the same name was removed. Continue taking this medication, and follow the directions you see here.         CONTINUE taking these medications    acetaminophen 650 MG Tbsr  Commonly known as: TYLENOL  Take 1,300 mg by mouth daily as needed (Headache).     acyclovir 400 MG tablet  Commonly known as: ZOVIRAX  Take 1 tablet (400 mg total) by mouth 2 (two) times daily.     allopurinoL 100 MG tablet  Commonly known as: ZYLOPRIM  Take 1 tablet (100 mg total) by mouth once daily.     aspirin 81 MG EC tablet  Commonly known as: ECOTRIN  Take 1 tablet (81 mg total) by mouth once daily.     chlorthalidone 25 MG Tab  Commonly known as: HYGROTEN  Take 1 tablet (25 mg total) by mouth once daily.     citalopram 20 MG tablet  Commonly known as: CeleXA  Take 1 tablet (20 mg total) by mouth once daily.     dapsone 100 MG Tab  Take 1 tablet (100 mg total) by mouth once daily.     deferasirox 500 MG disintegrating tablet  Commonly known as: EXJADE  Take 3 tablets (1,500 mg total) by mouth before breakfast.     ENSURE ORAL  Take by mouth daily as needed (supplement).     fluconazole 200 MG Tab  Commonly known as: DIFLUCAN  Take 2 tablets (400 mg total) by mouth once daily.     ketotifen 0.025 % (0.035 %) ophthalmic solution  Commonly known as: ZADITOR  Place 2-3 drops into both eyes daily as needed (Allergies).     loperamide 2 mg capsule  Commonly known as: IMODIUM  Take 4 mg by mouth daily as needed for Diarrhea.     loratadine 10 mg tablet  Commonly known as: CLARITIN  Take 10 mg by mouth daily as needed.     melatonin 5 mg  Commonly known as: MELATIN  Take 10 mg by mouth every evening.     MIRALAX 17 gram Pwpk  Generic drug: polyethylene glycol  Take 17 g by mouth daily as needed (Constipation).     mirtazapine 7.5 MG Tab  Commonly known as: REMERON  Take 1 tablet (7.5 mg total) by mouth every evening.     MULTIVITAMIN ORAL  Take 1 tablet by mouth once daily.     NIFEdipine 60 MG (OSM) 24 hr tablet  Commonly known as: PROCARDIA-XL  Take 1 tablet (60 mg total) by mouth once daily.     ondansetron 8 MG Tbdl  Commonly known as:  ZOFRAN-ODT  Dissolve 1 tablet (8 mg total) by mouth every 8 (eight) hours as needed.     pantoprazole 40 MG tablet  Commonly known as: PROTONIX  Take 1 tablet (40 mg total) by mouth once daily.     potassium chloride SA 20 MEQ tablet  Commonly known as: K-DUR,KLOR-CON  Take 1 tablet (20 mEq total) by mouth once daily.     predniSONE 10 MG tablet  Commonly known as: DELTASONE  Take 6 tabs by mouth once daily for 8 days, THEN 5 tabs once daily for 14 days, 4 tabs daily for 14 days, 3 tabs daily for 14 days, 2 tabs daily for 14 days, 1 tab once daily for 14 days, 0.5 tab once daily for 7 days.  Start taking on: May 19, 2022     REPATHA SURECLICK 140 mg/mL Pnij  Generic drug: evolocumab  Inject 140mg (1 pen) into the skin every 2 weeks.     TRUEPLUS LANCETS 30 gauge Misc  Generic drug: lancets  Use to test blood sugar daily     VIDAZA INJ  Inject as directed every 28 days.        STOP taking these medications    ciprofloxacin HCl 500 MG tablet  Commonly known as: CIPRO     levoFLOXacin 750 MG tablet  Commonly known as: LEVAQUIN     lisinopriL-hydrochlorothiazide 20-12.5 mg per tablet  Commonly known as: PRINZIDE,ZESTORETIC              Immunizations Administered as of 6/1/2022     No immunizations on file.          _________________________________  Fidelina Maxwell NP  06/01/2022

## 2022-06-02 NOTE — PT/OT/SLP PROGRESS
Physical Therapy Treatment    Patient Name:  Susan Puente   MRN:  [id number]  Admit Date: 5/19/2022  Admitting Diagnosis: Acute respiratory failure with hypoxia  Recent Surgeries: N/A    General Precautions: Standard, fall   Orthopedic Precautions:N/A   Braces: N/A     Recommendations:     Discharge Recommendations:  home with home health   Level of Assistance Recommended at Discharge: Intermittent assistance   Discharge Equipment Recommendations: bedside commode, walker, rolling   Barriers to discharge: Inaccessible home environment, Decreased caregiver support    Assessment:     Susan Puente is a 71 y.o. female admitted with a medical diagnosis of Acute respiratory failure with hypoxia . Pt. Continues to make good functional progress and has now progressed to ambulating with a rollator with SBA/(S). Pt will benefit from continued snf rehab to help pt improve her overall functional mobility and safety.      Performance deficits affecting function:  weakness, impaired endurance, impaired self care skills, impaired functional mobilty, gait instability, impaired balance, decreased lower extremity function, decreased upper extremity function, impaired cardiopulmonary response to activity .    Rehab Potential is good    Activity Tolerance: Good    Plan:     Patient to be seen 6 x/week to address the above listed problems via gait training, therapeutic activities, therapeutic exercises, neuromuscular re-education, wheelchair management/training    · Plan of Care Expires: 06/19/22  · Plan of Care Reviewed with: patient    Subjective     Pt. Agreeable to work with PT and very motivated to participate with PT.     Pain/Comfort:  · Pain Rating 1: 0/10  · Pain Rating Post-Intervention 1: 0/10    Patient's cultural, spiritual, Jew conflicts given the current situation:  · no    Objective:     Communicated with pt's nurse prior to session.  Patient found up in chair with   upon PT entry to room.      Therapeutic Activities and Exercises: Nustep x 15mins set at a seat of 7 and load of 4 , to help improve pt's overall strength and cardiovascular endurance.    Functional Mobility:  · Transfers:     · Sit to Stand x multiple trials:  supervision with 4 wheeled walker  · Gait: ~240ft +100ft with rollator and SBA for safety. pt able to lock/unlock rollator and sit<>stand from rollator seat with SBA.  · Stairs:  Pt ascended/descended 8 stair(s) with No Assistive Device with right handrail with Stand-by Assistance and Contact Guard Assistance.   · Wheelchair Propulsion:  Pt propelled Standard wheelchair x 200 feet on Level tile with  Bilateral upper extremity with Supervision or Set-up Assistance.     AM-PAC 6 CLICK MOBILITY  20    Patient left up in chair with direct hand off to ..    GOALS:   Multidisciplinary Problems     Physical Therapy Goals        Problem: Physical Therapy    Goal Priority Disciplines Outcome Goal Variances Interventions   Physical Therapy Goal     PT, PT/OT Ongoing, Progressing     Description: PT goals until 6/6/22    1. Pt sit to stand with RW with supervision-not met  2. Pt to perform gait 200ft with RW with supervision.-not met  Updated 6/1/2022 Pt to perform gait 200ft with RW/rollator with supervision  3. Pt to go up/down curb step with RW with SBA.-not met  4. Pt to up/down 12 steps with R UE rail with no AD with CGA.-not met  5. Pt to perform B LE exs in sitting or supine x 15 reps to strengthen B LE to improve functional mobility.-not met  6. Pt to propel w/c 100ft with B UE on level surface with mod independent- Met 5/27/2022  Updated 6/2/2022 Pt to propel w/c 200ft with B UE on level surface with mod independent  7. Pt to  object off the floor with reacher with RW support with set up assist-not met  8. Pt to ambulated 10ft over uneven surface with Rw with supervision-not met  9. Added 5/31/2022 Ascend/Descend ramp with RW with SBA.                      Time Tracking:     PT Received On: 06/02/22  PT Start Time: 1035  PT Stop Time: 1110  PT Total Time (min): 35 min    Billable Minutes: Gait Training 20mins and Therapeutic Exercise 15mins    Treatment Type: Treatment  PT/PTA: PT     PTA Visit Number: 0     06/02/2022

## 2022-06-02 NOTE — PLAN OF CARE
Problem: Physical Therapy  Goal: Physical Therapy Goal  Description: PT goals until 6/6/22    1. Pt sit to stand with RW with supervision-not met  2. Pt to perform gait 200ft with RW with supervision.-not met  Updated 6/1/2022 Pt to perform gait 200ft with RW/rollator with supervision  3. Pt to go up/down curb step with RW with SBA.-not met  4. Pt to up/down 12 steps with R UE rail with no AD with CGA.-not met  5. Pt to perform B LE exs in sitting or supine x 15 reps to strengthen B LE to improve functional mobility.-not met  6. Pt to propel w/c 100ft with B UE on level surface with mod independent- Met 5/27/2022  Updated 6/2/2022 Pt to propel w/c 200ft with B UE on level surface with mod independent  7. Pt to  object off the floor with reacher with RW support with set up assist-not met  8. Pt to ambulated 10ft over uneven surface with Rw with supervision-not met  9. Added 5/31/2022 Ascend/Descend ramp with RW with SBA.    Outcome: Ongoing, Progressing

## 2022-06-03 NOTE — PROGRESS NOTES
[facility] - Skilled Nursing  Adult Nutrition  Progress Note    SUMMARY   Recommendations  Recommendation/Intervention: Continue diabetic diet,  Dc boost glucose   Nutrition Goal Status: goal met  Communication of RD Recs: other (comment) (POC)    Assessment and Plan    Moderate malnutrition    Moderate/Severe Protein-Calorie Malnutrition  Malnutrition in the context of Chronic Illness/Injury     Related to (etiology):  Decline in ADL's, taste changes     Signs and Symptoms (as evidenced by):  Energy Intake: <75% of estimated energy requirement for > one month  Body Fat Depletion: mild and moderate depletion of orbital's, triceps and thoracic and lumbar region   Muscle Mass Depletion: mild and moderate depletion of temples, clavicle region, interosseous muscle and lower extremities   Fluid Accumulation: moderate     Interventions(treatment strategy):  Carbohydrate modified diet -2000  Nutrition education 5/19  Collaboration with other providers     Nutrition Diagnosis Status:  Continues         Malnutrition Assessment               Skin (Micronutrient): dry, pallor  Hair/Scalp (Micronutrient): dry  Teeth (Micronutrient): broken dentition  Neck/Chest (Micronutrient): muscle wasting  Musculoskeletal/Lower Extremities: muscle wasting       Energy Intake (Malnutrition): less than 75% for greater than or equal to 1 month   Orbital Region (Subcutaneous Fat Loss): moderate depletion  Upper Arm Region (Subcutaneous Fat Loss): mild depletion  Thoracic and Lumbar Region: well nourished   Buddhism Region (Muscle Loss): moderate depletion  Clavicle Bone Region (Muscle Loss): moderate depletion  Clavicle and Acromion Bone Region (Muscle Loss): moderate depletion  Dorsal Hand (Muscle Loss): moderate depletion  Patellar Region (Muscle Loss): moderate depletion  Anterior Thigh Region (Muscle Loss): moderate depletion  Posterior Calf Region (Muscle Loss): moderate depletion                                            Reason for  "Assessment    Reason For Assessment: RD follow-up  Interdisciplinary Rounds: did not attend  General Information Comments: Appetite improving . Patient reports that she has been requesting boost glucose for all meals rather than daily as ordered. This was a misunderstanding, she likes it but her PO intake and appetite have improved such that she does not need it . She is concerned about her glucose checks and what drives it higher. She is on prednisone which makes it more difficult to  changes in glucose but it has improved her appetite. we made the decision to cancel the boost glucose. She appears more interested in managing her glucose  Nutrition Discharge Planning: Dc on diabetic diet with diabetic ONS of choice    Nutrition/Diet History    Spiritual, Cultural Beliefs, Scientology Practices, Values that Affect Care: no    Anthropometrics    Temp: 97.6 °F (36.4 °C)  Height: 5' 5" (165.1 cm)  Height (inches): 65 in  Weight Method: Bed Scale  Weight: 67 kg (147 lb 11.3 oz)  Weight (lb): 147.71 lb  Ideal Body Weight (IBW), Female: 125 lb  % Ideal Body Weight, Female (lb): 116.94 %  BMI (Calculated): 24.6       Lab/Procedures/Meds    Pertinent Labs Reviewed: reviewed  Pertinent Labs Comments: Hg 8.1, Hct 25.3, BUN 39m, glucose 124,  Pertinent Medications Reviewed: reviewed  Pertinent Medications Comments: prednisone             Estimated/Assessed Needs    Weight Used For Calorie Calculations: 73.5 kg (162 lb 0.6 oz)  Energy Calorie Requirements (kcal): 4659-9009  Energy Need Method: Kcal/kg  Protein Requirements: 81g  Weight Used For Protein Calculations: 73.5 kg (162 lb 0.6 oz)  Fluid Requirements (mL): 1837 or per MD  Estimated Fluid Requirement Method: RDA Method  RDA Method (mL): 1837  CHO Requirement: 229g      Nutrition Prescription Ordered    Current Diet Order: 2000 diabetic  Oral Nutrition Supplement: -    Evaluation of Received Nutrient/Fluid Intake    I/O: -1360  to date  Energy Calories Required: " meeting needs  Protein Required: meeting needs  Fluid Required: meeting needs  Comments: LBM 6/2  Tolerance: tolerating  % Intake of Estimated Energy Needs: 75 - 100 %  % Meal Intake: 75 - 100 %    Nutrition Risk    Level of Risk/Frequency of Follow-up: low (one time per week)     Monitor and Evaluation    Food and Nutrient Adminstration: diet order  Knowledge/Beliefs/Attitudes: food and nutrition knowledge/skill  Physical Activity and Function: nutrition-related ADLs and IADLs  Anthropometric Measurements: weight change  Biochemical Data, Medical Tests and Procedures: electrolyte and renal panel, gastrointestinal profile, glucose/endocrine profile, inflammatory profile     Nutrition Follow-Up    RD Follow-up?: Yes

## 2022-06-03 NOTE — PLAN OF CARE
Moderate/Severe Protein-Calorie Malnutrition  Malnutrition in the context of Chronic Illness/Injury     Related to (etiology):  Decline in ADL's, taste changes     Signs and Symptoms (as evidenced by):  Energy Intake: <75% of estimated energy requirement for > one month  Body Fat Depletion: mild and moderate depletion of orbital's, triceps and thoracic and lumbar region   Muscle Mass Depletion: mild and moderate depletion of temples, clavicle region, interosseous muscle and lower extremities   Fluid Accumulation: moderate     Interventions(treatment strategy):  Carbohydrate modified diet - 2000 calories  Nutrition education 5/19  Collaboration with other providers     Nutrition Diagnosis Status:           Resolving

## 2022-06-03 NOTE — PT/OT/SLP PROGRESS
"Occupational Therapy   Treatment    Name: Susan Puente  MRN: [id number]  Admit Date: 5/19/2022  Admitting Diagnosis:  Acute respiratory failure with hypoxia    General Precautions: Standard, fall   Orthopedic Precautions:N/A   Braces: N/A     Recommendations:     Discharge Recommendations: home with home health  Level of Assistance Recommended at Discharge: Intermittent assistance for ADL's and homemaking tasks  Discharge Equipment Recommendations:  bedside commode, walker, rolling  Barriers to discharge:  Decreased caregiver support    Assessment:     Susan Puente is a 71 y.o. female with a medical diagnosis of Acute respiratory failure with hypoxia.  Focus of today's session was on functional strengthening of pt's BUE that's needed to perform daily tasks.  She presents with the following. Performance deficits affecting function are weakness, impaired endurance, impaired self care skills, impaired functional mobilty, gait instability, impaired balance, decreased lower extremity function, pain, decreased upper extremity function, impaired cardiopulmonary response to activity.     Rehab Potential is excellent    Activity tolerance:  Good    Plan:     Patient to be seen 6 x/week to address the above listed problems via self-care/home management, therapeutic exercises, therapeutic activities    · Plan of Care Expires: 06/20/22  · Plan of Care Reviewed with: patient    Subjective   "I have been trouble grasping my pills.  I have been scooping them all up at once, and they just fall on the floor."   Communicated with: nursing and PT prior to session.    Pain/Comfort:  · Pain Rating 1: other (see comments) (not rated; "gout pain")  · Location - Side 1: Left  · Location - Orientation 1: generalized  · Location 1:  (lower leg)  · Pain Addressed 1: Distraction  · Pain Rating Post-Intervention 1: other (see comments) (see above)    Patient's cultural, spiritual, Sabianist conflicts given the current " situation:  · no    Objective:     Patient found up in chair in rehab gym with PT present concluding PT session with Other (comments) (no active lines) upon OT entry to room.    Bed Mobility:    · N/a due to pt sitting up in the w/c at beginning and end of session     Functional Mobility/Transfers:  · Functional Mobility: Pt self-propelled her w/c in the gym ~20 ft and back to her room ~165 ft with supervision.      Activities of Daily Living:  · Deferred    AMPAC 6 Click ADL: 21    OT Exercises: Pt performed bilateral hand/digit resistive exercises with red TheraPutty while seated at the adjustable table.  She performed composite hand squeezes, lateral pinches, composite digital extension, and pressing peg into the putty.  These were performed for bilateral hand strengthening that's needed to perform daily tasks.     Treatment & Education:  - Pt performed simulated fine motor pill management activity while seated at the adjustable table.  She was able to remove the pill bottle cap, grasp and place 10 simulated pills from the table without any drops, and close the pill bottle cap without assistance.  Pt was then able to perform graded activity by removing simulated pills from red TheraPutty and placing them in the pill bottle without assistance and without any drops.      Pt edu on role of OT, POC, and benefit of performing resistive BUE fine motor exercises.        Patient left up in chair with call button in reachEducation:      GOALS:   Multidisciplinary Problems     Occupational Therapy Goals        Problem: Occupational Therapy    Goal Priority Disciplines Outcome Interventions   Occupational Therapy Goal     OT, PT/OT Ongoing, Progressing    Description: Goals to be met by: 6/3/22    Patient will increase functional independence with ADLs by performing:    UE Dressing with [place]. = MET  LE Dressing with Modified [place].  Grooming while standing at sink with Supervision.  Toileting from toilet with  Supervision Assistance for hygiene and clothing management.   Bathing from shower chair/bench with Supervision.  Step transfer with Supervision using RW. = MET  Toilet transfer to toilet with Supervision. = MET  Upper extremity exercise program with supervision.                         Time Tracking:     OT Date of Treatment: 06/03/22  OT Start Time: 1400    OT Stop Time: 1438  OT Total Time (min): 38 min    Billable Minutes:Therapeutic Activity 15 min  Therapeutic Exercise 23 min    6/3/2022

## 2022-06-03 NOTE — TELEPHONE ENCOUNTER
Outgoing call to pt regarding Exjade prescription. Pt stated she has not heard if she will be continuing her stay at SNF or not yet and requested a call back on 6/6 in the afternoon to see what the decision will be. Will follow up then.

## 2022-06-03 NOTE — PT/OT/SLP PROGRESS
Physical Therapy Treatment    Patient Name:  Susan Puente   MRN:  [id number]  Admit Date: 5/19/2022  Admitting Diagnosis: Acute respiratory failure with hypoxia  Recent Surgeries: N/A    General Precautions: Standard, fall   Orthopedic Precautions:N/A   Braces: N/A     Recommendations:     Discharge Recommendations:  home with home health   Level of Assistance Recommended at Discharge: Intermittent assistance   Discharge Equipment Recommendations: bedside commode, walker, rolling   Barriers to discharge: Inaccessible home environment, Decreased caregiver support    Assessment:     Susan Puente is a 71 y.o. female admitted with a medical diagnosis of Acute respiratory failure with hypoxia. Pt tolerated therapy session well with focus on increasing independence with rollator, including amb up and down curb step, in order to assist with achieving highest level of function upon d/c from PT. Pt would continue to benefit from skilled PT services per POC.       Performance deficits affecting function:  weakness, impaired endurance, impaired self care skills, impaired functional mobilty, gait instability, impaired balance, decreased lower extremity function, decreased upper extremity function, impaired cardiopulmonary response to activity .    Rehab Potential is excellent    Activity Tolerance: Good    Plan:     Patient to be seen 6 x/week to address the above listed problems via gait training, therapeutic activities, therapeutic exercises, neuromuscular re-education, wheelchair management/training    · Plan of Care Expires: 06/19/22  · Plan of Care Reviewed with: patient    Subjective     Pt agreeable to PT.     Pain/Comfort:  · Pain Rating 1: 0/10  · Pain Rating Post-Intervention 1: 0/10    Patient's cultural, spiritual, Jew conflicts given the current situation:  · no    Objective:   Patient found up in chair with 2LO2 oxygen upon PT entry to room.     Functional Mobility:  · Transfers:     · Sit to  "Stand: 3 sets, stand by assistance with 4 wheeled walker  · Gait: x 250', x 35' with rollator, SBA, with fair safety awareness on locking brakes to sit on rollator seat, requiring minimal reminders. Side stepping performed in // bars x 2 sets of // bars, SBA.  · Stairs:  Pt ascended/descended 4" curb step with 4 wheeled walker with no handrails with Contact Guard Assistance.     AM-PAC 6 CLICK MOBILITY  20    Patient left up in chair with OT present in gym.    GOALS:   Multidisciplinary Problems     Physical Therapy Goals        Problem: Physical Therapy    Goal Priority Disciplines Outcome Goal Variances Interventions   Physical Therapy Goal     PT, PT/OT Ongoing, Progressing     Description: PT goals until 6/6/22    1. Pt sit to stand with RW with supervision-not met  2. Pt to perform gait 200ft with RW with supervision.-not met  Updated 6/1/2022 Pt to perform gait 200ft with RW/rollator with supervision  3. Pt to go up/down curb step with RW with SBA.-not met  4. Pt to up/down 12 steps with R UE rail with no AD with CGA.-not met  5. Pt to perform B LE exs in sitting or supine x 15 reps to strengthen B LE to improve functional mobility.-not met  6. Pt to propel w/c 100ft with B UE on level surface with mod independent- Met 5/27/2022  Updated 6/2/2022 Pt to propel w/c 200ft with B UE on level surface with mod independent  7. Pt to  object off the floor with reacher with RW support with set up assist-not met  8. Pt to ambulated 10ft over uneven surface with Rw with supervision-not met  9. Added 5/31/2022 Ascend/Descend ramp with RW with SBA.                     Time Tracking:     PT Received On: 06/03/22  PT Start Time: 1323  PT Stop Time: 1400  PT Total Time (min): 37 min    Billable Minutes: Gait Training 20 and Therapeutic Activity 17    Treatment Type: Treatment  PT/PTA: PTA     PTA Visit Number: 1     06/03/2022  "

## 2022-06-04 NOTE — PLAN OF CARE
Problem: Adult Inpatient Plan of Care  Goal: Plan of Care Review  Outcome: Ongoing, Progressing  Goal: Patient-Specific Goal (Individualized)  Outcome: Ongoing, Progressing     Problem: Diabetes Comorbidity  Goal: Blood Glucose Level Within Targeted Range  Outcome: Ongoing, Progressing     Problem: Adjustment to Illness (Sepsis/Septic Shock)  Goal: Optimal Coping  Outcome: Ongoing, Progressing     Problem: Fluid and Electrolyte Imbalance (Acute Kidney Injury/Impairment)  Goal: Fluid and Electrolyte Balance  Outcome: Ongoing, Progressing     Problem: Fluid and Electrolyte Imbalance (Acute Kidney Injury/Impairment)  Goal: Fluid and Electrolyte Balance  Outcome: Ongoing, Progressing     Problem: Oral Intake Inadequate (Acute Kidney Injury/Impairment)  Goal: Optimal Nutrition Intake  Outcome: Ongoing, Progressing     Problem: Renal Function Impairment (Acute Kidney Injury/Impairment)  Goal: Effective Renal Function  Outcome: Ongoing, Progressing     Problem: Fluid Imbalance (Pneumonia)  Goal: Fluid Balance  Outcome: Ongoing, Progressing     Problem: Infection (Pneumonia)  Goal: Resolution of Infection Signs and Symptoms  Outcome: Ongoing, Progressing     Problem: Respiratory Compromise (Pneumonia)  Goal: Effective Oxygenation and Ventilation  Outcome: Ongoing, Progressing     Problem: Infection  Goal: Absence of Infection Signs and Symptoms  Outcome: Ongoing, Progressing     Problem: Impaired Wound Healing  Goal: Optimal Wound Healing  Outcome: Ongoing, Progressing     Problem: Fall Injury Risk  Goal: Absence of Fall and Fall-Related Injury  Outcome: Ongoing, Progressing     Problem: Skin Injury Risk Increased  Goal: Skin Health and Integrity  Outcome: Ongoing, Progressing     Problem: Malnutrition  Goal: Improved Nutritional Intake  Outcome: Ongoing, Progressing

## 2022-06-04 NOTE — PT/OT/SLP PROGRESS
"Occupational Therapy   Treatment    Name: Susan Puente  MRN: [id number]  Admit Date: 5/19/2022  Admitting Diagnosis:  Acute respiratory failure with hypoxia    General Precautions: Standard, fall   Orthopedic Precautions:N/A   Braces:       Recommendations:     Discharge Recommendations: home with home health  Level of Assistance Recommended at Discharge: Intermittent assistance for ADL's and homemaking tasks  Discharge Equipment Recommendations:  bedside commode, walker, rolling  Barriers to discharge:  Decreased caregiver support    Assessment:     Susan Puente is a 71 y.o. female with a medical diagnosis of Acute respiratory failure with hypoxia .  She presents with performance deficits affecting function are   weakness, impaired endurance, impaired self care skills, impaired functional mobility, gait instability, impaired balance, decreased lower extremity function, pain, decreased upper extremity function, impaired cardiopulmonary response to activity. Pt session limited due to pt visitor and politely ask to defer session.    Rehab Potential is excellent    Activity tolerance:  Good    Plan:     Patient to be seen 6 x/week to address the above listed problems via self-care/home management, therapeutic exercises, therapeutic activities    · Plan of Care Expires: 06/20/22  · Plan of Care Reviewed with: patient    Subjective     Communicated with: Dennis prior to session. "I asked for an extension, I don't feel ready and also have no where to go". A client care conference was performed between the JOE and ELE, prior to treatment to discuss the patient's status, treatment plan and established goals.     Pain/Comfort:  · Pain Rating 1: 0/10  · Pain Rating Post-Intervention 1: 0/10    Patient's cultural, spiritual, Anabaptist conflicts given the current situation:  · no    Objective:     Patient found up in chair with  (seated in WC) upon OT entry to room.      Treatment & Education:    Patient and family " educated on goals in therapy OT role and encouragement  to continue to participate in  tx sessions . All questions answered within ADRYAN scope of practice, all verbalized understanding, Deferred discharge questions and medical questions to SW/NP/MD.    Patient left up in chair with call button in reachEducation:      GOALS:   Multidisciplinary Problems     Occupational Therapy Goals        Problem: Occupational Therapy    Goal Priority Disciplines Outcome Interventions   Occupational Therapy Goal     OT, PT/OT Ongoing, Progressing    Description: Goals to be met by: 6/3/22    Patient will increase functional independence with ADLs by performing:    UE Dressing with [place]. = MET  LE Dressing with Modified [place].  Grooming while standing at sink with Supervision.  Toileting from toilet with Supervision Assistance for hygiene and clothing management.   Bathing from shower chair/bench with Supervision.  Step transfer with Supervision using RW. = MET  Toilet transfer to toilet with Supervision. = MET  Upper extremity exercise program with supervision.                         Time Tracking:     OT Date of Treatment: 06/04/22  OT Start Time: 1430    OT Stop Time: 1447  OT Total Time (min): 17 min    Billable Minutes:Therapeutic Activity 17    6/4/2022

## 2022-06-05 NOTE — PT/OT/SLP PROGRESS
Physical Therapy Treatment    Patient Name:  Susan Puente   MRN:  [id number]  Admit Date: 5/19/2022  Admitting Diagnosis: Acute respiratory failure with hypoxia  Recent Surgeries: none    General Precautions: Standard, fall   Orthopedic Precautions:N/A   Braces:   none    Recommendations:     Discharge Recommendations:  home with home health   Level of Assistance Recommended at Discharge: Intermittent assistance   Discharge Equipment Recommendations: bedside commode, walker, rolling   Barriers to discharge: Inaccessible home environment, Decreased caregiver support    Assessment:     Susan Puente is a 71 y.o. female admitted with a medical diagnosis of Acute respiratory failure with hypoxia . Pt shazia session well w/ good participation. She is progressing well and remains at a SBA/SPV level for transfers, gait, and advanced gait activities using a rollator. She will continue PT POC to prepare for D/C home.      Performance deficits affecting function:  weakness, impaired endurance, impaired self care skills, impaired functional mobilty, gait instability, impaired balance, decreased lower extremity function, decreased upper extremity function, impaired cardiopulmonary response to activity .    Rehab Potential is good    Activity Tolerance: Good    Plan:     Patient to be seen 6 x/week to address the above listed problems via gait training, therapeutic activities, therapeutic exercises, neuromuscular re-education, wheelchair management/training    · Plan of Care Expires: 06/19/22  · Plan of Care Reviewed with: patient    Subjective     Pt agreeable to session.     Pain/Comfort:  · Pain Rating 1: 0/10  · Pain Rating Post-Intervention 1: 0/10    Patient's cultural, spiritual, Moravian conflicts given the current situation:  · no    Objective:     Communicated with patient prior to session.  Patient found up in chair with  (no lines) upon PT entry to room.     Therapeutic Activities and Exercises:   Pt in  "standing used a reacher to  3 rings from the floor w/ a rollator and SBA/SPV     Functional Mobility:  · Transfers:    · Sit<>stand to/from w/c, multiple trials; all w/ rollator and SPV  · Gait:   · >200ft w/ rollator and SBA/SPV for safety  · Including 10ft over blue uneven foam mat w/ rollator and CGA  · Stairs:   · Asc/carter 4" curb w/ rollator and CGA  · Asc/carter 8 steps w/ BUE on RHR and SBA/SPV  · Wheelchair Propulsion:    · 150ft w/ BUE [place]    AM-PAC 6 CLICK MOBILITY  20    Patient left up in chair with call button in reach.    GOALS:   Multidisciplinary Problems     Physical Therapy Goals        Problem: Physical Therapy    Goal Priority Disciplines Outcome Goal Variances Interventions   Physical Therapy Goal     PT, PT/OT Ongoing, Progressing     Description: PT goals until 6/6/22    1. Pt sit to stand with RW with supervision-Met 6/5/2022  2. Pt to perform gait 200ft with RW with supervision.-not met  Updated 6/1/2022 Pt to perform gait 200ft with RW/rollator with supervision- Met 6/5/2022  3. Pt to go up/down curb step with RW with SBA.-not met  4. Pt to up/down 12 steps with R UE rail with no AD with CGA.-not met  5. Pt to perform B LE exs in sitting or supine x 15 reps to strengthen B LE to improve functional mobility.-not met  6. Pt to propel w/c 100ft with B UE on level surface with mod independent- Met 5/27/2022  Updated 6/2/2022 Pt to propel w/c 200ft with B UE on level surface with mod independent- Met 6/5/2022  7. Pt to  object off the floor with reacher with RW support with set up assist-not met  8. Pt to ambulated 10ft over uneven surface with Rw with supervision-not met  9. Added 5/31/2022 Ascend/Descend ramp with RW with SBA.                     Time Tracking:     PT Received On: 06/05/22  PT Start Time: 0800  PT Stop Time: 0826  PT Total Time (min): 26 min    Billable Minutes: Gait Training 13, Therapeutic Activity 13 and Total Time 26    Treatment Type: Treatment  PT/PTA: PT   "   PTA Visit Number: 0     06/05/2022

## 2022-06-06 NOTE — PLAN OF CARE
Problem: Adult Inpatient Plan of Care  Goal: Plan of Care Review  Outcome: Ongoing, Progressing  Goal: Patient-Specific Goal (Individualized)  Outcome: Ongoing, Progressing  Goal: Absence of Hospital-Acquired Illness or Injury  Outcome: Ongoing, Progressing  Goal: Optimal Comfort and Wellbeing  Outcome: Ongoing, Progressing  Goal: Readiness for Transition of Care  Outcome: Ongoing, Progressing     Problem: Diabetes Comorbidity  Goal: Blood Glucose Level Within Targeted Range  Outcome: Ongoing, Progressing     Problem: Adjustment to Illness (Sepsis/Septic Shock)  Goal: Optimal Coping  Outcome: Ongoing, Progressing     Problem: Glycemic Control Impaired (Sepsis/Septic Shock)  Goal: Blood Glucose Level Within Desired Range  Outcome: Ongoing, Progressing

## 2022-06-06 NOTE — PT/OT/SLP PROGRESS
Physical Therapy Treatment    Patient Name:  Susan Puente   MRN:  [id number]  Admit Date: 5/19/2022  Admitting Diagnosis: Acute respiratory failure with hypoxia  Recent Surgeries: N/A    General Precautions: Standard, fall   Orthopedic Precautions:N/A   Braces: N/A     Recommendations:     Discharge Recommendations:  home with home health   Level of Assistance Recommended at Discharge: Intermittent assistance   Discharge Equipment Recommendations: bedside commode, walker, rolling   Barriers to discharge: Inaccessible home environment, Decreased caregiver support    Assessment:     Susan Puente is a 71 y.o. female admitted with a medical diagnosis of Acute respiratory failure with hypoxia. Pt tolerated therapy session well with focus on increasing independence with gait training using rollator, curb training, transfers, LE strength and cardio endurance. Pt would continue to benefit from skilled PT services per POC.       Performance deficits affecting function:  weakness, impaired endurance, impaired self care skills, impaired functional mobilty, gait instability, impaired balance, decreased lower extremity function, decreased upper extremity function, impaired cardiopulmonary response to activity .    Rehab Potential is excellent and good    Activity Tolerance: Good    Plan:     Patient to be seen 6 x/week to address the above listed problems via gait training, therapeutic activities, therapeutic exercises, neuromuscular re-education, wheelchair management/training    · Plan of Care Expires: 06/19/22  · Plan of Care Reviewed with: patient    Subjective     Pt agreeable to PT.     Pain/Comfort:  · Pain Rating 1: 0/10  · Pain Rating Post-Intervention 1: 0/10    Patient's cultural, spiritual, Zoroastrian conflicts given the current situation:  · no    Objective:     Patient found up in chair with  (no lines) upon PT entry to room.     Therapeutic Activities and Exercises: nustep x 10 mins, mod to max  "resistance    Functional Mobility:  · Transfers:     · Sit to Stand:  stand by assistance with 4 wheeled walker. Pt requires reminders on locking brakes, sequencing.   · Rollator to nustep chair: stand by assistance with  4 wheeled walker  using  Step Transfer  · Gait: x 105', rollator, SBA. Pt given ace bandage wrap to LLE to assist with increase DF due to drop foot.  · Stairs:  Pt ascended/descended 4" curb step with 4 wheeled walker with no handrails with Contact Guard Assistance/Min A for AD mngmt.    · Wheelchair Propulsion:  Pt propelled Standard wheelchair x 85 feet on Level tile with  Bilateral upper extremity with Supervision or Set-up Assistance.     AM-PAC 6 CLICK MOBILITY  20    Patient left up in chair with call button in reach.    GOALS:   Multidisciplinary Problems     Physical Therapy Goals        Problem: Physical Therapy    Goal Priority Disciplines Outcome Goal Variances Interventions   Physical Therapy Goal     PT, PT/OT Ongoing, Progressing     Description: PT goals until 6/6/22    1. Pt sit to stand with RW with supervision-Met 6/5/2022  2. Pt to perform gait 200ft with RW with supervision.-not met  Updated 6/1/2022 Pt to perform gait 200ft with RW/rollator with supervision- Met 6/5/2022  3. Pt to go up/down curb step with RW with SBA.-not met  4. Pt to up/down 12 steps with R UE rail with no AD with CGA.-not met  5. Pt to perform B LE exs in sitting or supine x 15 reps to strengthen B LE to improve functional mobility.-not met  6. Pt to propel w/c 100ft with B UE on level surface with mod independent- Met 5/27/2022  Updated 6/2/2022 Pt to propel w/c 200ft with B UE on level surface with mod independent- Met 6/5/2022  7. Pt to  object off the floor with reacher with RW support with set up assist-not met  8. Pt to ambulated 10ft over uneven surface with Rw with supervision-not met  9. Added 5/31/2022 Ascend/Descend ramp with RW with SBA.                     Time Tracking:     PT Received " On: 06/06/22  PT Start Time: 1510  PT Stop Time: 1550  PT Total Time (min): 40 min    Billable Minutes: Gait Training 18, Therapeutic Activity 12 and Therapeutic Exercise 10    Treatment Type: Treatment  PT/PTA: PTA     PTA Visit Number: 1     06/06/2022

## 2022-06-07 NOTE — PT/OT/SLP PROGRESS
Physical Therapy Treatment    Patient Name:  Susan Puente   MRN:  [id number]  Admit Date: 5/19/2022  Admitting Diagnosis: Acute respiratory failure with hypoxia  Recent Surgeries: N/A    General Precautions: Standard, fall   Orthopedic Precautions:N/A   Braces: N/A     Recommendations:     Discharge Recommendations:  home with home health   Level of Assistance Recommended at Discharge: Intermittent assistance   Discharge Equipment Recommendations: bedside commode, walker, rolling   Barriers to discharge: Inaccessible home environment, Decreased caregiver support    Assessment:     Susan Puente is a 71 y.o. female admitted with a medical diagnosis of Acute respiratory failure with hypoxia. Pt tolerated therapy session well with focus on increasing independence with gait using rollator in order to assist with achieving highest level of function. Pt would continue to benefit from skilled PT services per POC.       Performance deficits affecting function:  weakness, impaired endurance, impaired self care skills, impaired functional mobilty, gait instability, impaired balance, decreased lower extremity function, decreased upper extremity function, impaired cardiopulmonary response to activity .    Rehab Potential is excellent    Activity Tolerance: Good    Plan:     Patient to be seen 6 x/week to address the above listed problems via gait training, therapeutic activities, therapeutic exercises, neuromuscular re-education, wheelchair management/training    · Plan of Care Expires: 06/19/22  · Plan of Care Reviewed with: patient    Subjective     Pt agreeable to PT.     Pain/Comfort:  · Pain Rating 1: 0/10  · Pain Rating Post-Intervention 1: 0/10    Patient's cultural, spiritual, Roman Catholic conflicts given the current situation:  · no    Objective:     Communicated with OT prior to session.  Patient found up in chair with  (no lines) upon PT entry to gym with OT.     Functional Mobility:  · Transfers:     · Sit  to Stand: 2 sets, stand by assistance with 4 wheeled walker. Reminders on sequencing/brakes.   · Gait: x 145', x 45', 4WW, SBA. Reminders on sequencing/brakes.   · Stairs:  Pt ascended/descended 12 stair(s) with No Assistive Device with right handrail with Stand-by Assistance and Contact Guard Assistance.     AM-PAC 6 CLICK MOBILITY  20    Patient left up in chair with call button in reach.    GOALS:   Multidisciplinary Problems     Physical Therapy Goals        Problem: Physical Therapy    Goal Priority Disciplines Outcome Goal Variances Interventions   Physical Therapy Goal     PT, PT/OT Ongoing, Progressing     Description: PT goals until 6/6/22    1. Pt sit to stand with RW with supervision-Met 6/5/2022  2. Pt to perform gait 200ft with RW with supervision.-not met  Updated 6/1/2022 Pt to perform gait 200ft with RW/rollator with supervision- Met 6/5/2022  3. Pt to go up/down curb step with RW with SBA.-not met  4. Pt to up/down 12 steps with R UE rail with no AD with CGA.-not met  5. Pt to perform B LE exs in sitting or supine x 15 reps to strengthen B LE to improve functional mobility.-not met  6. Pt to propel w/c 100ft with B UE on level surface with mod independent- Met 5/27/2022  Updated 6/2/2022 Pt to propel w/c 200ft with B UE on level surface with mod independent- Met 6/5/2022  7. Pt to  object off the floor with reacher with RW support with set up assist-not met  8. Pt to ambulated 10ft over uneven surface with Rw with supervision-not met  9. Added 5/31/2022 Ascend/Descend ramp with RW with SBA.                     Time Tracking:     PT Received On: 06/07/22  PT Start Time: 1530  PT Stop Time: 1549  PT Total Time (min): 19 min    Billable Minutes: Gait Training 19    Treatment Type: Treatment  PT/PTA: PTA     PTA Visit Number: 2     06/07/2022

## 2022-06-07 NOTE — PLAN OF CARE
Patient contacted  last night to inform her she had made the decision to go to [facility].  Today  sent referrals and necessary documentation to [facility] but was told that there are no available beds and that patient must disenroll from managed Medicare in order to be put on the waiting list.   relate information to patient and patient is considering other options.    Jana Gonzales, [facility]  Case Management Department  [facility]  [email or web address]

## 2022-06-07 NOTE — PT/OT/SLP PROGRESS
Occupational Therapy   Treatment    Name: Susan Puente  MRN: [id number]  Admit Date: 5/19/2022  Admitting Diagnosis:  Acute respiratory failure with hypoxia    General Precautions: Standard, fall   Orthopedic Precautions:N/A   Braces: N/A     Recommendations:     Discharge Recommendations: home with home health  Level of Assistance Recommended at Discharge: Intermittent assistance for ADL's and homemaking tasks  Discharge Equipment Recommendations:  bedside commode, walker, rolling  Barriers to discharge:  Decreased caregiver support    Assessment:     Susan Puente is a 71 y.o. female with a medical diagnosis of Acute respiratory failure with hypoxia .  She remains limited in performance of self-care , functional mobility and ADLs and currently not performing tasks at OF . Currently presenting with performance deficits including  impaired endurance, impaired self care skills, impaired functional mobilty, gait instability, impaired balance, decreased lower extremity function, decreased coordination, decreased safety awareness, impaired cardiopulmonary response to activity, decreased ROM.  Pt tolerated Tx without incident and is making progress with self care tasks, functional mobility and functional transfers .  She would continue to benefit from OT intervention to further her functional (I)ce and safety.    Rehab Potential is good    Activity tolerance:  Good    Plan:     Patient to be seen 6 x/week to address the above listed problems via self-care/home management, therapeutic exercises, therapeutic activities    · Plan of Care Expires: 06/20/22  · Plan of Care Reviewed with: patient    Subjective     Communicated with: nurse prior to session.   Pain/Comfort:  · Pain Rating 1: 0/10  · Pain Rating Post-Intervention 1: 0/10    Patient's cultural, spiritual, Pentecostal conflicts given the current situation:  · no    Objective:     Patient found up in chair with  (no lines) upon OT entry to room.    Bed  Mobility:    · Pt seated in W/C at onset of therapy session.    Functional Mobility/Transfers:  · Patient completed Sit <> Stand Transfer with supervision  with  rolling walker   Functional Mobility: Patient propelled W/C from activity room to therapy gym using (B) UEs a distance of 230 ft wit no assist.     Activities of Daily Living:  · Pt's (L) LE ace wrapped to provide Dorsi flex assist .    AMPAC 6 Click ADL: 21    OT Exercises: UE Ergometer Performed 15 minutes on UBE with Mod resistance  UE exercises performed to increase functional endurance and strength in order increase independence when performing self care tasks, functional ambulation, W/C propulsion, and functional standing activities .    Treatment & Education:  Pt edu on POC, safety when performing self care tasks,  and safety when performing functional transfers and mobility.  - White board updated  - Self care tasks completed-- as noted above       Patient left up in chair with PTA presentEducation:   and initiating her Tx session.     GOALS:   Multidisciplinary Problems     Occupational Therapy Goals        Problem: Occupational Therapy    Goal Priority Disciplines Outcome Interventions   Occupational Therapy Goal     OT, PT/OT Ongoing, Progressing    Description: Goals to be met by: 6/3/22    Patient will increase functional independence with ADLs by performing:    UE Dressing with [place]. = MET  LE Dressing with Modified [place].  Grooming while standing at sink with Supervision.  Toileting from toilet with Supervision Assistance for hygiene and clothing management.   Bathing from shower chair/bench with Supervision.  Step transfer with Supervision using RW. = MET  Toilet transfer to toilet with Supervision. = MET  Upper extremity exercise program with supervision.                         Time Tracking:     OT Date of Treatment: 06/07/22  OT Start Time: 1430    OT Stop Time: 1500  OT Total Time (min): 30 min    Billable  Minutes:Therapeutic Activity 14  Therapeutic Exercise 16    6/7/2022

## 2022-06-07 NOTE — PROGRESS NOTES
[facility]                                  Skilled Nursing Facility                   Progress Note     Admit Date: 5/19/2022  SHABBIR   Principal Problem:  Acute respiratory failure with hypoxia   HPI obtained from patient interview and chart review     Chief Complaint: Re-evaluation of medical treatment and therapy status: lab review, symptomatic anemia    HPI:    Ms. Puente is a 71- year old patient with PMHx of MDS on treatment with Vidaza (currently receiving decitabine due to national Vidaza shortage; Dr. Valdes pt),  DM2, HTN, CAD, anxiety, and depression  Who presents to SNF following hospitalization for acute respiratory failure with hypoxia.    Interval history:    Patient reports feeling ok today. Sitting in wheel chair. Patient denies shortness of breath, abdominal discomfort, nausea, or vomiting. Adequate appetite. Having regular bowel movements.  Patient progessing with PT/OT. Fatigue improved. Labs reviewed no critical results. hgb 8.1, stable.     Past Medical History: Patient has a past medical history of Allergic rhinitis, Allergy, Anemia, Anxiety, CAD (coronary artery disease), Carotid stenosis, Colon polyps (2016), Controlled type 2 diabetes mellitus without complication, without long-term current use of insulin (10/19/2016), Depression, GERD (gastroesophageal reflux disease), Hearing loss in right ear, psychiatric care, Hypokalemia (2/3/2020), MDS (myelodysplastic syndrome) with 5q deletion, Psychiatric problem, and Therapy.    Past Surgical History: Patient has a past surgical history that includes Tonsillectomy; Carotid stent; Carotid endarterectomy (Left, 2011); Bunionectomy; Endometrial ablation; Colonoscopy (N/A, 12/20/2016); Tympanostomy tube placement; Bone marrow biopsy (Left, 6/7/2018); Bone marrow biopsy (Left, 3/21/2019); Bone marrow biopsy (Left, 10/24/2019); Insertion of tunneled central venous catheter (CVC)  with subcutaneous port (N/A, 2/19/2020); and Bone marrow biopsy (Left, 4/21/2021).    Social History: Patient reports that she quit smoking about 5 years ago. Her smoking use included cigarettes and vaping with nicotine. She has a 75.00 pack-year smoking history. She has never used smokeless tobacco. She reports that she does not drink alcohol and does not use drugs.    Family History: family history includes Alcohol abuse in her brother and father; Cancer in her paternal grandmother; Heart disease in her father and mother; Hyperlipidemia in her mother.    Allergies: Patient is allergic to revlimid [lenalidomide].    ROS  Constitutional: Negative for fever   Eyes: Negative for blurred vision, double vision   Respiratory: Negative for cough, shortness of breath   Cardiovascular: Negative for chest pain, palpitations, and leg swelling.   Gastrointestinal: Negative for abdominal pain, constipation, diarrhea, nausea, vomiting.   Genitourinary: Negative for dysuria, frequency   Musculoskeletal:  + generalized weakness.  Improving pain to left foot  Skin: Negative for itching and rash.   Neurological: Negative for dizziness, headaches.   Psychiatric/Behavioral: Negative for depression. The patient is not nervous/anxious.      24 hour Vital Sign Range   Temp:  [98.2 °F (36.8 °C)-98.7 °F (37.1 °C)]   Pulse:  [74-83]   Resp:  [18]   BP: (104-150)/(51-68)   SpO2:  [94 %-95 %]     PEx  Constitutional: Patient appears debilitated.  No distress noted  HENT:   Head: Normocephalic and atraumatic.   Eyes: Pupils are equal, round  Neck: Normal range of motion. Neck supple.   Cardiovascular: Normal rate, regular rhythm and normal heart sounds.    Pulmonary/Chest: Effort normal and breath sounds are clear    Abdominal: Soft. Bowel sounds are normal.   Musculoskeletal: Normal range of motion.   Neurological: Alert and oriented to person, place, and time.   Psychiatric: Normal mood and affect. Behavior is normal.   Skin: Skin is warm  and dry.  Redness to right foot a great toe, improving    Recent Labs   Lab 06/07/22  0620      K 4.4      CO2 26   BUN 34*   CREATININE 0.7   MG 1.6       Recent Labs   Lab 06/07/22  0620   WBC 6.61   RBC 2.35*   HGB 7.0*   HCT 22.7*   *   MCV 97   MCH 29.8   MCHC 30.8*         Assessment and Plan:    Symptomatic anemia  Fatigue improved. Labs reviewed no critical results. hgb 8.1, stable.      Acute gout of foot  - patient reports difficulty bearing weight on bilat feet due to pain, improving  - x-ray showing soft tissue edema suggestive of gout flare  - uric acid wnl  - allopurinol 100 mg daily for prevention  -   Prednisone taper also to treat  - 5/31 initiated colchicine 0.6 mg daily  improving    Neuropathy  continue home gabapentin 100 mg BID    Seasonal allergies  continue cetirizine 10 mg daily    Azotemia  - slowly improving, continue to hold chlorthalidone     Acute respiratory failure with hypoxia  - Patient admitted with acute hypoxic respiratory failure, she was treated for PNA during recent admission and was initiated on home oxygen after 6 min walk test previous admission.  - CXR with pulmonary consolidation and fever on admission, recently completed levaquin/azithro course for CAP.   - Completed Cefepime course.  - Continue scheduled DuoNebs  - 5/9: Pulmonary consulted given increasing O2 requirements.   - S/p Bronch 5/11 AM. AFB/KOH staining was negative. Gram stain negative. Remaining cultures/studies NGTD. Bronch cytology with macrophages and inflammation.  - Continue Prednisone taper 60mg x 2wks, then taper down by 10mg every two weeks until complete.  - Given prolonged steroid taper, will continue GI ppx and dapsone for PJP ppx  - Will f/u with pulm outpatient   - 5/25 respiratory status improving, continue weaning efforts    MDS (myelodysplastic syndrome)  - Primary oncologist, Dr. Gita Valdse  [facility]  - Initially with 5 q minus syndrome and treated with Revlimid. Developed  allergy and was then desensitized. Soon after developed pancytopenia and Revlimid was stopped June 2017.    - BMBX on 6/8/17 was with normal cytogenetics. 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 Of 20 metaphases, 5 were normal and 15 had a 5q deletion, NGS positive for SF3B1 and TP53 (12%). 1.4% blasts  - Received vidaza for 22 cycles, received Decitabine for C23 due to national shortage of vidaza and then back to vidaza with C24.  - completed cycle 39 on 4/8/2  - due for cycle 40 on 05/02/2022, postponed whiled admitted and to be readdressed as outpatient   - 5/23 message sent to Oncology team, see HPI for details     Essential hypertension  - At home regimen: lisinopril/hctz and coreg  - [place] [facility] [place] who recommended switching HCTZ to Chlorthalidone 25mg daily and adding Nifedipine XL 60mg with outpatient follow up. Nifedipine was held d/t hypotension and to allow for some permissible hypertension given clinical status  -  Continue Current anti-HTN regimen: Lisinopril 40mg daily, Coreg 25mg BID, Chlorthalidone 25mg daily. Nifedipine XL 60mg on  - Will need cardiology f/u outpatient  BP low to normal, monitor     Pancytopenia due to antineoplastic chemotherapy  - Transfuse for hgb < 7 or plts < 10  - Continue acyclovir ppx  - Cefepime and fluconazole d/c'd 5/12 given normal WBC and afebrile   -  Monitor twice weekly CBCs     CAP (community acquired pneumonia)  - Pt with 2 weeks of productive cough, nasal  congestion  - Upon arrival to the hospial she was hypoxic requiring 3L NC, weaned to 2L today  - CXR with likely develop consolidation  - CTA shows New patchy consolidative opacities in the in the right upper and posterior right lower lobes.  Findings suggestive infectious etiology such as pneumonia, multifocal pneumonia.  Also consider atypical given patient's reported immunocompromised status.  Aspiration could present similarly.  Consider continued follow-up after acute clinical illness has resolved to ensure resolution. New mediastinal and bilateral hilar lymphadenopathy, likely reactive.  - completed azithromycin and 7 day course of Levaquin on 4/30.  - CXR on admission showing possible developing consolidation: Cefepime 4/30-5/3    - See acute respiratory failure with hypoxia above     Controlled type 2 diabetes mellitus without complication, without long-term current use of insulin  - Will hold home po diabetic medications  -   Accu-Cheks AC/HS, diabetic diet  - Goal bg 140-180  -  Continue on low-dose sliding scale insulin    Insomnia  - Continue nightly melatonin 9 mg qHS, remeron 7.5 mg qHS.       Adjustment disorder with mixed anxiety and depressed mood  - continue home celexa 20 mg daily     CAD (coronary artery disease)  - S/P left carotid endarterectomy prior to cancer dx, s/p PCI (3 stents) >20 years ago   - Continue on ASA 81mg daily, platelets are wnl  - On repatha as outpatient (statin intolerant)    Debility   - Continue with PT/OT for gait training and strengthening and restoration of ADL's   - Encourage mobility, OOB in chair, and early ambulation as appropriate  - Fall precautions   - Monitor for bowel and bladder dysfunction  - Monitor for and prevent skin breakdown and pressure ulcers  - Continue DVT prophylaxis with  frequent ambulation         Anticipate disposition:  Nursing home, PPD and chest x-ray ordered      Follow-up needed during SNF stay- Dr. Valdes 6/2    Appointments not to send  patient to- 5/30, 5/31    Follow-up needed after discharge from SNF:   - PCP, hospital follow-up, needs to be scheduled  - Oncology, scheduled for after SNF discharge    No future appointments.      Vera Lugo NP  Department of Hospital Medicine   [facility]     DOS: 6/3/22       Patient note was created using MModal Dictation.  Any errors in syntax or even information may not have been identified and edited on initial review prior to signing this note.

## 2022-06-07 NOTE — PROGRESS NOTES
[facility] [place] - Skilled Nursing  Wound Care    Patient Name:  Susan Puente   MRN:  [id number]  Date: 2022  Diagnosis: Acute respiratory failure with hypoxia    History:     Past Medical History:   Diagnosis Date    Allergic rhinitis     Allergy     Anemia     Anxiety     CAD (coronary artery disease)     Carotid stenosis     Colon polyps     Controlled type 2 diabetes mellitus without complication, without long-term current use of insulin 10/19/2016    Depression     GERD (gastroesophageal reflux disease)     Hearing loss in right ear     Hx of psychiatric care     Celexa, Prozac    Hypokalemia 2/3/2020    MDS (myelodysplastic syndrome) with 5q deletion     Psychiatric problem     Therapy        Social History     Socioeconomic History    Marital status: Single   Tobacco Use    Smoking status: Former Smoker     Packs/day: 1.50     Years: 50.00     Pack years: 75.00     Types: Cigarettes, Vaping with nicotine     Quit date: 3/3/2017     Years since quittin.2    Smokeless tobacco: Never Used   Substance and Sexual Activity    Alcohol use: No    Drug use: No   Other Topics Concern    Patient feels they ought to cut down on drinking/drug use No    Patient annoyed by others criticizing their drinking/drug use No    Patient has felt bad or guilty about drinking/drug use No    Patient has had a drink/used drugs as an eye opener in the AM No   Social History Narrative    Single, never , no children, former , [facility], [place]     Social Determinants of Health     Financial Resource Strain: Low Risk     Difficulty of Paying Living Expenses: Not hard at all   Food Insecurity: No Food Insecurity    Worried About Running Out of Food in the Last Year: Never true    Ran Out of Food in the Last Year: Never true   Transportation Needs: No Transportation Needs    Lack of Transportation (Medical): No    Lack of Transportation (Non-Medical): No   Physical Activity: Inactive    Days of Exercise per  Week: 0 days    Minutes of Exercise per Session: 0 min   Stress: No Stress Concern Present    Feeling of Stress : Only a little   Social Connections: Moderately Isolated    Frequency of Communication with Friends and Family: More than three times a week    Frequency of Social Gatherings with Friends and Family: More than three times a week    Attends [employer] Services: More than 4 times per year    Active Member of Clubs or Organizations: No    Attends Club or Organization Meetings: Never    Marital Status: Never    Housing Stability: Low Risk     Unable to Pay for Housing in the Last Year: No    Number of Places Lived in the Last Year: 1    Unstable Housing in the Last Year: No       Precautions:     Allergies as of 05/19/2022 - Reviewed 05/19/2022   Allergen Reaction Noted    Revlimid [lenalidomide] Swelling 06/08/2017       WO Assessment Details/Treatment   Wound care follow-up  The left coccyx has a partial thickness skin loss from IAD/friction    Plan :  Left coccyx- continue Triad ointment BID/prn cleansing    Nursing to continue care, pressure prevention measures  Wound care will follow-up prn  Recommendations made to primary team for above plan per written report . Orders placed.      06/07/22 0830        Altered Skin Integrity 05/25/22 0750 Left Coccyx Incontinence associated dermatitis   Date First Assessed/Time First Assessed: 05/25/22 0750   Altered Skin Integrity Present on Admission: yes  Side: Left  Location: Coccyx  Primary Wound Type: Incontinence associated dermatitis   Wound Image    Dressing Appearance Open to air   Drainage Amount None   Drainage Characteristics/Odor No odor   Appearance Pink;Moist;Epithelialization;White   Tissue loss description Partial thickness   Periwound Area Intact;Dry;Pink   Wound Edges Open;Irregular   Wound Length (cm) 0.5 cm   Wound Width (cm) 0.2 cm   Wound Depth (cm) 0.2 cm   Wound Volume (cm^3) 0.02 cm^3   Wound Surface Area (cm^2) 0.1 cm^2   Care Cleansed  with:;Sterile normal saline;Applied:;Skin Barrier     06/07/2022

## 2022-06-07 NOTE — PLAN OF CARE
Problem: Occupational Therapy  Goal: Occupational Therapy Goal  Description: Goals to be met by: 6/3/22    Patient will increase functional independence with ADLs by performing:    UE Dressing with [place]. = MET  LE Dressing with Modified [place].  Grooming while standing at sink with Supervision.  Toileting from toilet with Supervision Assistance for hygiene and clothing management.   Bathing from shower chair/bench with Supervision.  Step transfer with Supervision using RW. = MET  Toilet transfer to toilet with Supervision. = MET  Upper extremity exercise program with supervision.        Outcome: Ongoing, Progressing

## 2022-06-07 NOTE — PROGRESS NOTES
[facility]                                  Skilled Nursing Facility                   Progress Note     Admit Date: 5/19/2022  SHABBIR   Principal Problem:  Acute respiratory failure with hypoxia   HPI obtained from patient interview and chart review     Chief Complaint: Re-evaluation of medical treatment and therapy status: lab review, symptomatic anemia    HPI:    Ms. Puente is a 71- year old patient with PMHx of MDS on treatment with Vidaza (currently receiving decitabine due to national Vidaza shortage; Dr. Valdes pt),  DM2, HTN, CAD, anxiety, and depression  Who presents to SNF following hospitalization for acute respiratory failure with hypoxia.    Interval history:    Labs reviewed, hemoglobin down to 7.0 today  Patient reports feeling as if she needs a blood transfusion  Discussed at length, patient agreeable to repeat CBC this afternoon, and if hemoglobin less than 7 will transfuse  Patient concerned with blood matching, stating she has multiple antibodies  Initiated order for CBC and type and screen for today  All of today's labs reviewed and are listed below.  24 hr vital sign ranges listed below.  Patient denies shortness of breath, abdominal discomfort, nausea, or vomiting.  Patient reports an adequate appetite.  Patient denies dysuria.  Patient reports having regular bowel movements.  Patient progessing with PT/OT. Continuing to follow and treat all acute and chronic conditions.      Past Medical History: Patient has a past medical history of Allergic rhinitis, Allergy, Anemia, Anxiety, CAD (coronary artery disease), Carotid stenosis, Colon polyps (2016), Controlled type 2 diabetes mellitus without complication, without long-term current use of insulin (10/19/2016), Depression, GERD (gastroesophageal reflux disease), Hearing loss in right ear, psychiatric care, Hypokalemia (2/3/2020), MDS (myelodysplastic syndrome) with 5q  deletion, Psychiatric problem, and Therapy.    Past Surgical History: Patient has a past surgical history that includes Tonsillectomy; Carotid stent; Carotid endarterectomy (Left, 2011); Bunionectomy; Endometrial ablation; Colonoscopy (N/A, 12/20/2016); Tympanostomy tube placement; Bone marrow biopsy (Left, 6/7/2018); Bone marrow biopsy (Left, 3/21/2019); Bone marrow biopsy (Left, 10/24/2019); Insertion of tunneled central venous catheter (CVC) with subcutaneous port (N/A, 2/19/2020); and Bone marrow biopsy (Left, 4/21/2021).    Social History: Patient reports that she quit smoking about 5 years ago. Her smoking use included cigarettes and vaping with nicotine. She has a 75.00 pack-year smoking history. She has never used smokeless tobacco. She reports that she does not drink alcohol and does not use drugs.    Family History: family history includes Alcohol abuse in her brother and father; Cancer in her paternal grandmother; Heart disease in her father and mother; Hyperlipidemia in her mother.    Allergies: Patient is allergic to revlimid [lenalidomide].    ROS  Constitutional: Negative for fever   Eyes: Negative for blurred vision, double vision   Respiratory: Negative for cough, shortness of breath   Cardiovascular: Negative for chest pain, palpitations, and leg swelling.   Gastrointestinal: Negative for abdominal pain, constipation, diarrhea, nausea, vomiting.   Genitourinary: Negative for dysuria, frequency   Musculoskeletal:  + generalized weakness.  Improving pain to left foot  Skin: Negative for itching and rash.   Neurological: Negative for dizziness, headaches.   Psychiatric/Behavioral: Negative for depression. The patient is not nervous/anxious.      24 hour Vital Sign Range   Temp:  [98.2 °F (36.8 °C)-98.7 °F (37.1 °C)]   Pulse:  [74-83]   Resp:  [18]   BP: (104-150)/(51-68)   SpO2:  [94 %-95 %]     PEx  Constitutional: Patient appears debilitated.  No distress noted  HENT:   Head: Normocephalic and  atraumatic.   Eyes: Pupils are equal, round  Neck: Normal range of motion. Neck supple.   Cardiovascular: Normal rate, regular rhythm and normal heart sounds.    Pulmonary/Chest: Effort normal and breath sounds are clear    Abdominal: Soft. Bowel sounds are normal.   Musculoskeletal: Normal range of motion.   Neurological: Alert and oriented to person, place, and time.   Psychiatric: Normal mood and affect. Behavior is normal.   Skin: Skin is warm and dry.  Redness to right foot a great toe, improving    Recent Labs   Lab 06/07/22  0620      K 4.4      CO2 26   BUN 34*   CREATININE 0.7   MG 1.6       Recent Labs   Lab 06/07/22  0620   WBC 6.61   RBC 2.35*   HGB 7.0*   HCT 22.7*   *   MCV 97   MCH 29.8   MCHC 30.8*         Assessment and Plan:    Symptomatic anemia  Fatigue improved. Labs reviewed no critical results. hgb 8.1, stable.   6/7-hemoglobin down to 7.0 today  Patient reports feeling as if she needs a blood transfusion  Discussed at length, patient agreeable to repeat CBC this afternoon, and if hemoglobin less than 7 will transfuse  Patient concerned with blood matching, stating she has multiple antibodies  Initiated order for CBC and type and screen for today     Acute gout of foot  - patient reports difficulty bearing weight on bilat feet due to pain, improving  - x-ray showing soft tissue edema suggestive of gout flare  - uric acid wnl  - allopurinol 100 mg daily for prevention  -   Prednisone taper also to treat  - 5/31 initiated colchicine 0.6 mg daily  Improving  -6/7-reports improvement in pain today    Neuropathy  continue home gabapentin 100 mg BID    Seasonal allergies  continue cetirizine 10 mg daily    Azotemia  - slowly improving, continue to hold chlorthalidone     Acute respiratory failure with hypoxia  - Patient admitted with acute hypoxic respiratory failure, she was treated for PNA during recent admission and was initiated on home oxygen after 6 min walk test previous  admission.  - CXR with pulmonary consolidation and fever on admission, recently completed levaquin/azithro course for CAP.   - Completed Cefepime course.  - Continue scheduled DuoNebs  - 5/9: Pulmonary consulted given increasing O2 requirements.   - S/p Bronch 5/11 AM. AFB/KOH staining was negative. Gram stain negative. Remaining cultures/studies NGTD. Bronch cytology with macrophages and inflammation.  - Continue Prednisone taper 60mg x 2wks, then taper down by 10mg every two weeks until complete.  - Given prolonged steroid taper, will continue GI ppx and dapsone for PJP ppx  - Will f/u with pulm outpatient   - 5/25 respiratory status improving, continue weaning efforts    MDS (myelodysplastic syndrome)  - Primary oncologist, Dr. Gtia Valdes  [facility] [place]:  - Initially with 5 q minus syndrome and treated with Revlimid. Developed allergy and was then desensitized. Soon after developed pancytopenia and Revlimid was stopped June 2017.    - BMBX on 6/8/17 was with normal cytogenetics. 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 Of 20 metaphases, 5 were normal and 15 had a 5q deletion, NGS positive for SF3B1 and TP53 (12%). 1.4% blasts  - Received vidaza for 22 cycles, received Decitabine for C23 due to national shortage of vidaza and then back to vidaza with C24.  - completed cycle 39 on 4/8/2  - due for cycle 40 on 05/02/2022, postponed whiled admitted and to be readdressed as outpatient   - 5/23 message sent to Oncology team, see HPI for  details     Essential hypertension  - At home regimen: lisinopril/hctz and coreg  - Per [facility], Discussed with cardiology who recommended switching HCTZ to Chlorthalidone 25mg daily and adding Nifedipine XL 60mg with outpatient follow up. Nifedipine was held d/t hypotension and to allow for some permissible hypertension given clinical status  -  Continue Current anti-HTN regimen: Lisinopril 40mg daily, Coreg 25mg BID, Chlorthalidone 25mg daily. Nifedipine XL 60mg on  - Will need cardiology f/u outpatient  BP low to normal, monitor     Pancytopenia due to antineoplastic chemotherapy  - Transfuse for hgb < 7 or plts < 10  - Continue acyclovir ppx  - Cefepime and fluconazole d/c'd 5/12 given normal WBC and afebrile   -  Monitor twice weekly CBCs     CAP (community acquired pneumonia)  - Pt with 2 weeks of productive cough, nasal congestion  - Upon arrival to the hospial she was hypoxic requiring 3L NC, weaned to 2L today  - CXR with likely develop consolidation  - CTA shows New patchy consolidative opacities in the in the right upper and posterior right lower lobes.  Findings suggestive infectious etiology such as pneumonia, multifocal pneumonia.  Also consider atypical given patient's reported immunocompromised status.  Aspiration could present similarly.  Consider continued follow-up after acute clinical illness has resolved to ensure resolution. New mediastinal and bilateral hilar lymphadenopathy, likely reactive.  - completed azithromycin and 7 day course of Levaquin on 4/30.  - CXR on admission showing possible developing consolidation: Cefepime 4/30-5/3    - See acute respiratory failure with hypoxia above     Controlled type 2 diabetes mellitus without complication, without long-term current use of insulin  - Will hold home po diabetic medications  -   Accu-Cheks AC/HS, diabetic diet  - Goal bg 140-180  -  Continue on low-dose sliding scale insulin    Insomnia  - Continue nightly melatonin 9 mg qHS, remeron 7.5 mg  qHS.       Adjustment disorder with mixed anxiety and depressed mood  - continue home celexa 20 mg daily     CAD (coronary artery disease)  - S/P left carotid endarterectomy prior to cancer dx, s/p PCI (3 stents) >20 years ago   - Continue on ASA 81mg daily, platelets are wnl  - On repatha as outpatient (statin intolerant)    Debility   - Continue with PT/OT for gait training and strengthening and restoration of ADL's   - Encourage mobility, OOB in chair, and early ambulation as appropriate  - Fall precautions   - Monitor for bowel and bladder dysfunction  - Monitor for and prevent skin breakdown and pressure ulcers  - Continue DVT prophylaxis with  frequent ambulation         Anticipate disposition:  Nursing home, PPD and chest x-ray ordered      Follow-up needed during SNF stay- Dr. Valdes 6/2    Appointments not to send patient to- 5/30, 5/31    Follow-up needed after discharge from SNF:   - PCP, hospital follow-up, needs to be scheduled  - Oncology, scheduled for after SNF discharge    Future Appointments   Date Time Provider Department Center   6/8/2022  7:00 AM [facility] SPEC LAB [facility]   6/10/2022  7:00 AM [facility] SPEC LAB [facility]         Tram Estes NP  Department of Hospital Medicine   [facility]     DOS: 6/3/22       Patient note was created using MModal Dictation.  Any errors in syntax or even information may not have been identified and edited on initial review prior to signing this note.

## 2022-06-08 NOTE — PT/OT/SLP PROGRESS
Physical Therapy Treatment    Patient Name:  Susan Puente   MRN:  [id number]  Admit Date: 5/19/2022  Admitting Diagnosis: Acute respiratory failure with hypoxia  Recent Surgeries: N/A    General Precautions: Standard, fall   Orthopedic Precautions:N/A   Braces: N/A     Recommendations:     Discharge Recommendations:  home with home health   Level of Assistance Recommended at Discharge: Intermittent assistance   Discharge Equipment Recommendations: bedside commode, walker, rolling   Barriers to discharge: Inaccessible home environment, Decreased caregiver support    Assessment:     Susan Puente is a 71 y.o. female admitted with a medical diagnosis of Acute respiratory failure with hypoxia. Pt tolerated therapy session well with focus on LE strengthening, gait training/curb training and transfers in order to assist with preparing for highest level of independence. Pt would continue to benefit from skilled PT services per POC.       Performance deficits affecting function:  weakness, impaired endurance, impaired self care skills, impaired functional mobilty, gait instability, impaired balance, decreased lower extremity function, decreased upper extremity function, impaired cardiopulmonary response to activity .    Rehab Potential is excellent    Activity Tolerance: Good    Plan:     Patient to be seen 6 x/week to address the above listed problems via gait training, therapeutic activities, therapeutic exercises, neuromuscular re-education, wheelchair management/training    · Plan of Care Expires: 06/19/22  · Plan of Care Reviewed with: patient    Subjective     Pt agreeable to PT.     Pain/Comfort:  · Pain Rating 1: 0/10  · Pain Rating Post-Intervention 1: 0/10    Patient's cultural, spiritual, Confucianism conflicts given the current situation:  · no    Objective:     Communicated with OT prior to session.  Patient found up in chair with  (no lines) upon PT entry to room.     Therapeutic Activities and  "Exercises: Seated LE strength exercises using 3 lb cuff wts, including: LAQ's, AP, ABD/ADD scissors, marching, GS, ADD ball squeezes x 3 sec holds, 15 reps, each.     Functional Mobility:  · Transfers:     · Sit to Stand:  stand by assistance with 4 wheeled walker. Reminders on brakes.   · Gait: x 100', 4WW, SBA. Pt focus on LLE hip flexion due to drop foot/weakness in DF with ace wrap to assist.  · Stairs:  Pt ascended/descended 4" curb step with 4 wheeled walker with no handrails with Contact Guard Assistance.     AM-PAC 6 CLICK MOBILITY  20    Patient left up in chair with call button in reach.    GOALS:   Multidisciplinary Problems     Physical Therapy Goals        Problem: Physical Therapy    Goal Priority Disciplines Outcome Goal Variances Interventions   Physical Therapy Goal     PT, PT/OT Ongoing, Progressing     Description: PT goals until 6/6/22    1. Pt sit to stand with RW with supervision-Met 6/5/2022  2. Pt to perform gait 200ft with RW with supervision.-not met  Updated 6/1/2022 Pt to perform gait 200ft with RW/rollator with supervision- Met 6/5/2022  3. Pt to go up/down curb step with RW with SBA.-not met  4. Pt to up/down 12 steps with R UE rail with no AD with CGA.-not met  5. Pt to perform B LE exs in sitting or supine x 15 reps to strengthen B LE to improve functional mobility.-not met  6. Pt to propel w/c 100ft with B UE on level surface with mod independent- Met 5/27/2022  Updated 6/2/2022 Pt to propel w/c 200ft with B UE on level surface with mod independent- Met 6/5/2022  7. Pt to  object off the floor with reacher with RW support with set up assist-not met  8. Pt to ambulated 10ft over uneven surface with Rw with supervision-not met  9. Added 5/31/2022 Ascend/Descend ramp with RW with SBA.                     Time Tracking:     PT Received On: 06/08/22  PT Start Time: 1434  PT Stop Time: 1504  PT Total Time (min): 30 min    Billable Minutes: Gait Training 15 and Therapeutic Exercise " 15    Treatment Type: Treatment  PT/PTA: PTA     PTA Visit Number: 3     06/08/2022

## 2022-06-08 NOTE — PROGRESS NOTES
[facility]                                  Skilled Nursing Facility                   Progress Note     Admit Date: 5/19/2022  SHABBIR   Principal Problem:  Acute respiratory failure with hypoxia   HPI obtained from patient interview and chart review     Chief Complaint: Re-evaluation of medical treatment and therapy status: lab review, symptomatic anemia    HPI:    Ms. Puente is a 71- year old patient with PMHx of MDS on treatment with Vidaza (currently receiving decitabine due to national Vidaza shortage; Dr. Valdes pt),  DM2, HTN, CAD, anxiety, and depression  Who presents to SNF following hospitalization for acute respiratory failure with hypoxia.    Interval history:    Labs reviewed, hemoglobin improved to 7.3 today  She reports feeling little bit better today, denies dizziness  Reports improvement in range of motion with her left foot today    Past Medical History: Patient has a past medical history of Allergic rhinitis, Allergy, Anemia, Anxiety, CAD (coronary artery disease), Carotid stenosis, Colon polyps (2016), Controlled type 2 diabetes mellitus without complication, without long-term current use of insulin (10/19/2016), Depression, GERD (gastroesophageal reflux disease), Hearing loss in right ear, psychiatric care, Hypokalemia (2/3/2020), MDS (myelodysplastic syndrome) with 5q deletion, Psychiatric problem, and Therapy.    Past Surgical History: Patient has a past surgical history that includes Tonsillectomy; Carotid stent; Carotid endarterectomy (Left, 2011); Bunionectomy; Endometrial ablation; Colonoscopy (N/A, 12/20/2016); Tympanostomy tube placement; Bone marrow biopsy (Left, 6/7/2018); Bone marrow biopsy (Left, 3/21/2019); Bone marrow biopsy (Left, 10/24/2019); Insertion of tunneled central venous catheter (CVC) with subcutaneous port (N/A, 2/19/2020); and Bone marrow biopsy (Left, 4/21/2021).    Social History: Patient  reports that she quit smoking about 5 years ago. Her smoking use included cigarettes and vaping with nicotine. She has a 75.00 pack-year smoking history. She has never used smokeless tobacco. She reports that she does not drink alcohol and does not use drugs.    Family History: family history includes Alcohol abuse in her brother and father; Cancer in her paternal grandmother; Heart disease in her father and mother; Hyperlipidemia in her mother.    Allergies: Patient is allergic to revlimid [lenalidomide].    ROS  Constitutional: Negative for fever   Eyes: Negative for blurred vision, double vision   Respiratory: Negative for cough, shortness of breath   Cardiovascular: Negative for chest pain, palpitations, and leg swelling.   Gastrointestinal: Negative for abdominal pain, constipation, diarrhea, nausea, vomiting.   Genitourinary: Negative for dysuria, frequency   Musculoskeletal:  + generalized weakness.  Improving pain to left foot  Skin: Negative for itching and rash.   Neurological: Negative for dizziness, headaches.   Psychiatric/Behavioral: Negative for depression. The patient is not nervous/anxious.      24 hour Vital Sign Range   Temp:  [97.5 °F (36.4 °C)-98.1 °F (36.7 °C)]   Pulse:  [72-89]   Resp:  [18]   BP: (128-156)/(60-68)   SpO2:  [94 %-95 %]     PEx  Constitutional: Patient appears debilitated.  No distress noted  HENT:   Head: Normocephalic and atraumatic.   Eyes: Pupils are equal, round  Neck: Normal range of motion. Neck supple.   Cardiovascular: Normal rate, regular rhythm and normal heart sounds.    Pulmonary/Chest: Effort normal and breath sounds are clear    Abdominal: Soft. Bowel sounds are normal.   Musculoskeletal: Normal range of motion.   Neurological: Alert and oriented to person, place, and time.   Psychiatric: Normal mood and affect. Behavior is normal.   Skin: Skin is warm and dry.  Redness to right foot a great toe, improving    No results for input(s): GLUCOSE, NA, K, CL, CO2, BUN,  CREATININE, MG in the last 24 hours.    Invalid input(s):  CALCIUM    Recent Labs   Lab 06/07/22  1442   WBC 9.34   RBC 2.43*   HGB 7.3*   HCT 23.8*      MCV 98   MCH 30.0   MCHC 30.7*         Assessment and Plan:    Symptomatic anemia  Fatigue improved. Labs reviewed no critical results. hgb 8.1, stable.   6/7-hemoglobin down to 7.0 today  Patient reports feeling as if she needs a blood transfusion  Discussed at length, patient agreeable to repeat CBC this afternoon, and if hemoglobin less than 7 will transfuse  Patient concerned with blood matching, stating she has multiple antibodies  Initiated order for CBC and type and screen for today  6/8-hemoglobin improved to 7.3     Acute gout of foot  - patient reports difficulty bearing weight on bilat feet due to pain, improving  - x-ray showing soft tissue edema suggestive of gout flare  - uric acid wnl  - allopurinol 100 mg daily for prevention  -   Prednisone taper also to treat  - 5/31 initiated colchicine 0.6 mg daily  Improving  -6/7-reports improvement in pain today  6/8-reports improvement in range of motion in her left foot today    Neuropathy  continue home gabapentin 100 mg BID    Seasonal allergies  continue cetirizine 10 mg daily    Azotemia  - slowly improving, continue to hold chlorthalidone     Acute respiratory failure with hypoxia  - Patient admitted with acute hypoxic respiratory failure, she was treated for PNA during recent admission and was initiated on home oxygen after 6 min walk test previous admission.  - CXR with pulmonary consolidation and fever on admission, recently completed levaquin/azithro course for CAP.   - Completed Cefepime course.  - Continue scheduled DuoNebs  - 5/9: Pulmonary consulted given increasing O2 requirements.   - S/p Bronch 5/11 AM. AFB/KOH staining was negative. Gram stain negative. Remaining cultures/studies NGTD. Bronch cytology with macrophages and inflammation.  - Continue Prednisone taper 60mg x 2wks, then  taper down by 10mg every two weeks until complete.  - Given prolonged steroid taper, will continue GI ppx and dapsone for PJP ppx  - Will f/u with pulm outpatient   - 5/25 respiratory status improving, continue weaning efforts    MDS (myelodysplastic syndrome)  - Primary oncologist, Dr. Gita Valdes  PER [facility]:  - Initially with 5 q minus syndrome and treated with Revlimid. Developed allergy and was then desensitized. Soon after developed pancytopenia and Revlimid was stopped June 2017.    - BMBX on 6/8/17 was with normal cytogenetics. 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 Of 20 metaphases, 5 were normal and 15 had a 5q deletion, NGS positive for SF3B1 and TP53 (12%). 1.4% blasts  - Received vidaza for 22 cycles, received Decitabine for C23 due to national shortage of vidaza and then back to vidaza with C24.  - completed cycle 39 on 4/8/2  - due for cycle 40 on 05/02/2022, postponed whiled admitted and to be readdressed as outpatient   - 5/23 message sent to Oncology team, see HPI for details     Essential hypertension  - At home regimen: lisinopril/hctz and coreg  - Per [facility], Discussed with cardiology who recommended switching HCTZ to Chlorthalidone 25mg daily and adding Nifedipine XL 60mg with outpatient follow up. Nifedipine was held d/t hypotension and to allow for some permissible hypertension given clinical status  -  Continue Current anti-HTN regimen: Lisinopril 40mg daily, Coreg 25mg BID, Chlorthalidone 25mg daily. Nifedipine XL  60mg on  - Will need cardiology f/u outpatient  BP low to normal, monitor     Pancytopenia due to antineoplastic chemotherapy  - Transfuse for hgb < 7 or plts < 10  - Continue acyclovir ppx  - Cefepime and fluconazole d/c'd 5/12 given normal WBC and afebrile   -  Monitor twice weekly CBCs     CAP (community acquired pneumonia)  - Pt with 2 weeks of productive cough, nasal congestion  - Upon arrival to the hospial she was hypoxic requiring 3L NC, weaned to 2L today  - CXR with likely develop consolidation  - CTA shows New patchy consolidative opacities in the in the right upper and posterior right lower lobes.  Findings suggestive infectious etiology such as pneumonia, multifocal pneumonia.  Also consider atypical given patient's reported immunocompromised status.  Aspiration could present similarly.  Consider continued follow-up after acute clinical illness has resolved to ensure resolution. New mediastinal and bilateral hilar lymphadenopathy, likely reactive.  - completed azithromycin and 7 day course of Levaquin on 4/30.  - CXR on admission showing possible developing consolidation: Cefepime 4/30-5/3    - See acute respiratory failure with hypoxia above     Controlled type 2 diabetes mellitus without complication, without long-term current use of insulin  - Will hold home po diabetic medications  -   Accu-Cheks AC/HS, diabetic diet  - Goal bg 140-180  -  Continue on low-dose sliding scale insulin    Insomnia  - Continue nightly melatonin 9 mg qHS, remeron 7.5 mg qHS.       Adjustment disorder with mixed anxiety and depressed mood  - continue home celexa 20 mg daily     CAD (coronary artery disease)  - S/P left carotid endarterectomy prior to cancer dx, s/p PCI (3 stents) >20 years ago   - Continue on ASA 81mg daily, platelets are wnl  - On repatha as outpatient (statin intolerant)    Debility   - Continue with PT/OT for gait training and strengthening and restoration of ADL's   - Encourage mobility, OOB in chair,  and early ambulation as appropriate  - Fall precautions   - Monitor for bowel and bladder dysfunction  - Monitor for and prevent skin breakdown and pressure ulcers  - Continue DVT prophylaxis with  frequent ambulation         Anticipate disposition:  Nursing home, PPD and chest x-ray ordered      Follow-up needed during SNF stay- Dr. Valdes 6/2    Appointments not to send patient to- 5/30, 5/31    Follow-up needed after discharge from SNF:   - PCP, hospital follow-up, needs to be scheduled  - Oncology, scheduled for after SNF discharge    Future Appointments   Date Time Provider Department Center   6/10/2022  7:00 AM [facility] SPEC LAB [facility] SPECL[facility]         Tram Estes NP  Department of Hospital Medicine   [facility]     DOS: 6/3/22       Patient note was created using MModal Dictation.  Any errors in syntax or even information may not have been identified and edited on initial review prior to signing this note.

## 2022-06-08 NOTE — PT/OT/SLP PROGRESS
"Occupational Therapy   Treatment    Name: Susan Puente  MRN: [id number]  Admit Date: 5/19/2022  Admitting Diagnosis:  Acute respiratory failure with hypoxia    General Precautions: Standard, fall   Orthopedic Precautions:N/A   Braces:       Recommendations:     Discharge Recommendations: home with home health  Level of Assistance Recommended at Discharge: Intermittent assistance for ADL's and homemaking tasks  Discharge Equipment Recommendations:  bedside commode, walker, rolling  Barriers to discharge:  Decreased caregiver support    Assessment:     Susan Puente is a 71 y.o. female with a medical diagnosis of Acute respiratory failure with hypoxia .Performance deficits affecting function are  impaired endurance, impaired self care skills, impaired functional mobilty, gait instability, impaired balance, decreased lower extremity function, decreased coordination, decreased safety awareness, impaired cardiopulmonary response to activity, decreased ROM. Pt. participated well with session on this day. Pt is progressing well with session on this day still continues to requires cues with aspects of safety . Pt. Will continue to benefit from continued OT to progress towards goals    Rehab Potential is good    Activity tolerance:  Good    Plan:     Patient to be seen 6 x/week to address the above listed problems via self-care/home management, therapeutic exercises, therapeutic activities    · Plan of Care Expires: 06/20/22  · Plan of Care Reviewed with: patient    Subjective     Communicated with: nstolu prior to session. "Well hello"    Pain/Comfort:  Pain Rating 1: 0/10  Pain Rating Post-Intervention 1: 0/10    Patient's cultural, spiritual, Baptism conflicts given the current situation:  no    Objective:     Patient found up in chair  In activity room upon OT entry to room.    Bed Mobility:    · Not tested    Functional Mobility/Transfers:  · Patient completed Sit <> Stand Transfer with stand by assistance  " with  rolling walker   · Functional Mobility: Pt. Able to propel self in w/c from activity room to gym with good use of BUEs noted    Activities of Daily Living:  · Not tested    [facility] 6 Click ADL: 21    OT Exercises: UE Ergometer 10 mins with moderate resistance    Treatment & Education:  Pt. With standing act on this day with task. Pt. With CGA/SBA for balance aspects with task with  AD at raised counter Pt with fine motor task of painting portrait in standing x 20 min with standing bal and min cues throught out. Pt.able to complete finish task with no seated rest break required.    Pt. With 3# dowel activity with 2x15 reps with  shd flex, bicep curls horz adb/add and forward flex motion to increase BUE ROM and strength,.     Pt. With standing and therex performed to increase ROM, endurance selfcare task and fxl mobility for independence     Patient left up in chair with PTA presentEducation:      GOALS:   Multidisciplinary Problems     Occupational Therapy Goals        Problem: Occupational Therapy    Goal Priority Disciplines Outcome Interventions   Occupational Therapy Goal     OT, PT/OT Ongoing, Progressing    Description: Goals to be met by: 6/3/22    Patient will increase functional independence with ADLs by performing:    UE Dressing with Door. = MET  LE Dressing with Modified Door.  Grooming while standing at sink with Supervision.  Toileting from toilet with Supervision Assistance for hygiene and clothing management.   Bathing from shower chair/bench with Supervision.  Step transfer with Supervision using RW. = MET  Toilet transfer to toilet with Supervision. = MET  Upper extremity exercise program with supervision.                         Time Tracking:     OT Date of Treatment: 06/08/22  OT Start Time: 0145    OT Stop Time: 0233  OT Total Time (min): 48 min    Billable Minutes:Therapeutic Exercise 48    6/8/2022   OT and MARLOW have discussed the above patients goals and status in  collaboration with Plan of Care.

## 2022-06-09 NOTE — PT/OT/SLP PROGRESS
Physical Therapy Treatment    Patient Name:  Susan Puente   MRN:  [id number]  Admit Date: 5/19/2022  Admitting Diagnosis: Acute respiratory failure with hypoxia  Recent Surgeries: N/A    General Precautions: Standard, fall   Orthopedic Precautions:N/A   Braces: N/A     Recommendations:     Discharge Recommendations:  home with home health   Level of Assistance Recommended at Discharge: Intermittent assistance   Discharge Equipment Recommendations: bedside commode, walker, rolling   Barriers to discharge: Inaccessible home environment, Decreased caregiver support    Assessment:     Susan Puente is a 71 y.o. female admitted with a medical diagnosis of Acute respiratory failure with hypoxia. Pt tolerated therapy session well with focus on increasing independence with functional transfers, gait training/curb training and safety awareness in order to achieve highest level of function. Pt fit for L AFO with tennis shoes, tolerated well during gait and verbalized she feels more confident with the AFO due to foot drop. Pt would continue to benefit from skilled PT services per POC.       Performance deficits affecting function:  weakness, impaired endurance, impaired self care skills, impaired functional mobilty, gait instability, impaired balance, decreased lower extremity function, decreased upper extremity function, impaired cardiopulmonary response to activity .    Rehab Potential is excellent and good    Activity Tolerance: Good    Plan:     Patient to be seen 6 x/week to address the above listed problems via gait training, therapeutic activities, therapeutic exercises, neuromuscular re-education, wheelchair management/training    · Plan of Care Expires: 06/19/22  · Plan of Care Reviewed with: patient    Subjective     Pt agreeable to PT.     Pain/Comfort:  · Pain Rating 1: 0/10  · Pain Rating Post-Intervention 1: 0/10    Patient's cultural, spiritual, Yazdanism conflicts given the current  "situation:  · no    Objective:   Patient found sitting edge of bed with  (no lines) upon PT entry to room.     Functional Mobility:  · Transfers:     · Sit to Stand: 2 sets, supervision and stand by assistance with 4 wheeled walker. Reminders on locking brakes before sitting down on rollator seat.   · Gait: x 250', x 200', 4WW, SBA overall.  · Stairs:  Pt ascended/descended 4 stair(s) and 4" curb step with Rolling Walker with right handrail with Contact Guard Assistance.   · Several trials for AFO fitting onto L foot with shoe, with and without sock due to edema in L foot. Nurse notified of L foot edema, more than normal.     AM-PAC 6 CLICK MOBILITY  20    Patient left up in chair with call button in reach.    GOALS:   Multidisciplinary Problems     Physical Therapy Goals        Problem: Physical Therapy    Goal Priority Disciplines Outcome Goal Variances Interventions   Physical Therapy Goal     PT, PT/OT Ongoing, Progressing     Description: PT goals until 6/6/22    1. Pt sit to stand with RW with supervision-Met 6/5/2022  2. Pt to perform gait 200ft with RW with supervision.-not met  Updated 6/1/2022 Pt to perform gait 200ft with RW/rollator with supervision- Met 6/5/2022  3. Pt to go up/down curb step with RW with SBA.-not met  4. Pt to up/down 12 steps with R UE rail with no AD with CGA.-not met  5. Pt to perform B LE exs in sitting or supine x 15 reps to strengthen B LE to improve functional mobility.-not met  6. Pt to propel w/c 100ft with B UE on level surface with mod independent- Met 5/27/2022  Updated 6/2/2022 Pt to propel w/c 200ft with B UE on level surface with mod independent- Met 6/5/2022  7. Pt to  object off the floor with reacher with RW support with set up assist-not met  8. Pt to ambulated 10ft over uneven surface with Rw with supervision-not met  9. Added 5/31/2022 Ascend/Descend ramp with RW with SBA.                     Time Tracking:     PT Received On: 06/09/22  PT Start Time: " 0758  PT Stop Time: 0842  PT Total Time (min): 44 min    Billable Minutes: Gait Training 30 and Therapeutic Activity 14    Treatment Type: Treatment  PT/PTA: PTA     PTA Visit Number: 4     06/09/2022

## 2022-06-09 NOTE — PT/OT/SLP PROGRESS
"Occupational Therapy   Treatment    Name: Susan Puente  MRN: [id number]  Admit Date: 5/19/2022  Admitting Diagnosis:  Acute respiratory failure with hypoxia    General Precautions: Standard, fall   Orthopedic Precautions:N/A   Braces:       Recommendations:     Discharge Recommendations: home with home health  Level of Assistance Recommended at Discharge: Intermittent assistance for ADL's and homemaking tasks  Discharge Equipment Recommendations:  bedside commode, walker, rolling  Barriers to discharge:  Decreased caregiver support    Assessment:     Susan Puente is a 71 y.o. female with a medical diagnosis of Acute respiratory failure with hypoxia . Performance deficits affecting function are   impaired endurance, impaired self care skills, impaired functional mobilty, gait instability, impaired balance, decreased lower extremity function, decreased coordination, decreased safety awareness, impaired cardiopulmonary response to activity, decreased ROM.Pt. participated well with session on this day. Pt is progressing well with session on this day still continues to requires cues with aspects of safety . Port covered on this day by nurse prior to shower. Pt. Will continue to benefit from continued OT to progress towards goals    Rehab Potential is good    Activity tolerance:  Good    Plan:     Patient to be seen 6 x/week to address the above listed problems via self-care/home management, therapeutic exercises, therapeutic activities    · Plan of Care Expires: 06/20/22  · Plan of Care Reviewed with: patient    Subjective     Communicated with: neri prior to session. "Im excited to shower"    Pain/Comfort:  · Pain Rating 1: 0/10  · Pain Rating Post-Intervention 1: 0/10    Patient's cultural, spiritual, Rastafari conflicts given the current situation:  · no    Objective:     Patient found up in chair with nurse upon OT entry to room.      Functional Mobility/Transfers:  · Patient completed Sit <> Stand " Transfer with supervision  with  rolling walker   · Patient completed  Shower Transfer Stand Pivot technique with supervision with grab bars    Activities of Daily Living:  · Grooming: modified independence with grooming aspects while seated  · Bathing: stand by assistance with all bathing aspects while seated in shower stall   · Upper Body Dressing: modified independence to doff/julius pullover shirt  · Lower Body Dressing: supervision to doff/julius pants and BLE socks     AMPA 6 Click ADL: 21    OT Exercises: UE Ergometer 10 mins with moderate resistance    Treatment & Education:  Pt. Educated on safety with ADL tasks as well as functional mobility and need for staff assist.     Patient left up in chair with all lines intact and call button in reachEducation:      GOALS:   Multidisciplinary Problems     Occupational Therapy Goals        Problem: Occupational Therapy    Goal Priority Disciplines Outcome Interventions   Occupational Therapy Goal     OT, PT/OT Ongoing, Progressing    Description: Goals to be met by: 6/13/22    Patient will increase functional independence with ADLs by performing:    UE Dressing with [place]. = MET  LE Dressing with Modified [place].  Grooming while standing at sink with Supervision.  Toileting from toilet with Supervision Assistance for hygiene and clothing management.   Bathing from shower chair/bench with Supervision.  Step transfer with Supervision using RW. = MET  Toilet transfer to toilet with Supervision. = MET  Upper extremity exercise program with supervision.                         Time Tracking:     OT Date of Treatment: 06/09/22  OT Start Time: 1058    OT Stop Time: 1140  OT Total Time (min): 42 min    Billable Minutes:Self Care/Home Management 32  Therapeutic Exercise 10    6/9/2022   OT and MARLOW have discussed the above patients goals and status in collaboration with Plan of Care.

## 2022-06-09 NOTE — PLAN OF CARE
Problem: Adult Inpatient Plan of Care  Goal: Plan of Care Review  Outcome: Ongoing, Progressing  Goal: Patient-Specific Goal (Individualized)  Outcome: Ongoing, Progressing  Goal: Absence of Hospital-Acquired Illness or Injury  Outcome: Ongoing, Progressing     Problem: Diabetes Comorbidity  Goal: Blood Glucose Level Within Targeted Range  Outcome: Ongoing, Progressing     Problem: Adjustment to Illness (Sepsis/Septic Shock)  Goal: Optimal Coping  Outcome: Ongoing, Progressing     Problem: Fluid Imbalance (Pneumonia)  Goal: Fluid Balance  Outcome: Ongoing, Progressing     Problem: Infection (Pneumonia)  Goal: Resolution of Infection Signs and Symptoms  Outcome: Ongoing, Progressing     Problem: Respiratory Compromise (Pneumonia)  Goal: Effective Oxygenation and Ventilation  Outcome: Ongoing, Progressing     Problem: Infection  Goal: Absence of Infection Signs and Symptoms  Outcome: Ongoing, Progressing     Problem: Impaired Wound Healing  Goal: Optimal Wound Healing  Outcome: Ongoing, Progressing     Problem: Fall Injury Risk  Goal: Absence of Fall and Fall-Related Injury  Outcome: Ongoing, Progressing     Problem: Skin Injury Risk Increased  Goal: Skin Health and Integrity  Outcome: Ongoing, Progressing     Problem: Malnutrition  Goal: Improved Nutritional Intake  Outcome: Ongoing, Progressing

## 2022-06-10 NOTE — PROGRESS NOTES
[facility]                                  Skilled Nursing Facility                   Progress Note     Admit Date: 5/19/2022  SHABBIR   Principal Problem:  Acute respiratory failure with hypoxia   HPI obtained from patient interview and chart review     Chief Complaint: Re-evaluation of medical treatment and therapy status: Lab review, critical anemia    HPI:    Ms. Puente is a 71- year old patient with PMHx of MDS on treatment with Vidaza (currently receiving decitabine due to national Vidaza shortage; Dr. Valdes pt),  DM2, HTN, CAD, anxiety, and depression  Who presents to SNF following hospitalization for acute respiratory failure with hypoxia.    Interval history: All of today's labs reviewed and are listed below.  24 hr vital sign ranges listed below.  H&H originally resulted at 5.4/18, stat CBC redrawn type and screen ordered.  H&H returned at 6.4/21, 1 unit RBC transfusion ordered.  Patient with stable vital signs and reports feeling fine, at her baseline, has energy to perform therapy currently.  Gout pain and swelling remains an issue to her right foot but overall improved from admission.  Continuing on colchicine and prednisone.  Patient denies shortness of breath, abdominal discomfort, nausea, or vomiting.  Patient reports an adequate appetite.  Patient denies dysuria.  Patient reports having regular bowel movements.  Patient progessing with PT/OT. Continuing to follow and treat all acute and chronic conditions.    Past Medical History: Patient has a past medical history of Allergic rhinitis, Allergy, Anemia, Anxiety, CAD (coronary artery disease), Carotid stenosis, Colon polyps (2016), Controlled type 2 diabetes mellitus without complication, without long-term current use of insulin (10/19/2016), Depression, GERD (gastroesophageal reflux disease), Hearing loss in right ear, psychiatric care, Hypokalemia (2/3/2020), MDS  (myelodysplastic syndrome) with 5q deletion, Psychiatric problem, and Therapy.    Past Surgical History: Patient has a past surgical history that includes Tonsillectomy; Carotid stent; Carotid endarterectomy (Left, 2011); Bunionectomy; Endometrial ablation; Colonoscopy (N/A, 12/20/2016); Tympanostomy tube placement; Bone marrow biopsy (Left, 6/7/2018); Bone marrow biopsy (Left, 3/21/2019); Bone marrow biopsy (Left, 10/24/2019); Insertion of tunneled central venous catheter (CVC) with subcutaneous port (N/A, 2/19/2020); and Bone marrow biopsy (Left, 4/21/2021).    Social History: Patient reports that she quit smoking about 5 years ago. Her smoking use included cigarettes and vaping with nicotine. She has a 75.00 pack-year smoking history. She has never used smokeless tobacco. She reports that she does not drink alcohol and does not use drugs.    Family History: family history includes Alcohol abuse in her brother and father; Cancer in her paternal grandmother; Heart disease in her father and mother; Hyperlipidemia in her mother.    Allergies: Patient is allergic to revlimid [lenalidomide].    ROS  Constitutional: Negative for fever   Eyes: Negative for blurred vision, double vision   Respiratory: Negative for cough, shortness of breath   Cardiovascular: Negative for chest pain, palpitations, and leg swelling.   Gastrointestinal: Negative for abdominal pain, constipation, diarrhea, nausea, vomiting.   Genitourinary: Negative for dysuria, frequency   Musculoskeletal:  + generalized weakness.  Improving pain to right foot  Skin: Negative for itching and rash.   Neurological: Negative for dizziness, headaches.   Psychiatric/Behavioral: Negative for depression. The patient is not nervous/anxious.      24 hour Vital Sign Range   Temp:  [98.3 °F (36.8 °C)]   Pulse:  [77-88]   Resp:  [18]   BP: (130-132)/(60-78)   SpO2:  [96 %]     PEx  Constitutional: Patient appears debilitated.  No distress noted  HENT:   Head:  Normocephalic and atraumatic.   Eyes: Pupils are equal, round  Neck: Normal range of motion. Neck supple.   Cardiovascular: Normal rate, regular rhythm and normal heart sounds.    Pulmonary/Chest: Effort normal and breath sounds are clear    Abdominal: Soft. Bowel sounds are normal.   Musculoskeletal: Normal range of motion.   Neurological: Alert and oriented to person, place, and time.   Psychiatric: Normal mood and affect. Behavior is normal.   Skin: Skin is warm and dry.  Redness to right foot a great toe, improving    Recent Labs   Lab 06/10/22  0615      K 4.4      CO2 27   BUN 35*   CREATININE 0.9   MG 1.9       Recent Labs   Lab 06/10/22  0918   WBC 7.99   RBC 2.17*   HGB 6.4*   HCT 21.8*      *   MCH 29.5   MCHC 29.4*         Assessment and Plan:    Symptomatic anemia  - 6/10 initiated 1 unit RBC transfusion, type and screen ordered, blood consent obtained     Acute gout of foot  - patient reports difficulty bearing weight on bilat feet due to pain, improving  - x-ray showing soft tissue edema suggestive of gout flare  - uric acid wnl  - allopurinol 100 mg daily for prevention  -   Prednisone taper also to treat  - continue colchicine 0.6 mg daily    Neuropathy  - home gabapentin 100 mg BID    Seasonal allergies  - cetirizine 10 mg daily    Azotemia  - slowly improving, continue to hold chlorthalidone at this time    Acute respiratory failure with hypoxia  - Patient admitted with acute hypoxic respiratory failure, she was treated for PNA during recent admission and was initiated on home oxygen after 6 min walk test previous admission.  - CXR with pulmonary consolidation and fever on admission, recently completed levaquin/azithro course for CAP.   - Completed Cefepime course.  - Continue scheduled DuoNebs  - 5/9: Pulmonary consulted given increasing O2 requirements.   - S/p Bronch 5/11 AM. AFB/KOH staining was negative. Gram stain negative. Remaining cultures/studies NGTD. Bronch  cytology with macrophages and inflammation.  - Continue Prednisone taper 60mg x 2wks, then taper down by 10mg every two weeks until complete.  - Given prolonged steroid taper, will continue GI ppx and dapsone for PJP ppx  - Will f/u with pulm outpatient   - 5/25 respiratory status improving, continue weaning efforts    MDS (myelodysplastic syndrome)  - Primary oncologist, Dr. Gita Valdes  [place] [facility] [place]  - Initially with 5 q minus syndrome and treated with Revlimid. Developed allergy and was then desensitized. Soon after developed pancytopenia and Revlimid was stopped June 2017.    - BMBX on 6/8/17 was with normal cytogenetics. 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 Of 20 metaphases, 5 were normal and 15 had a 5q deletion, NGS positive for SF3B1 and TP53 (12%). 1.4% blasts  - Received vidaza for 22 cycles, received Decitabine for C23 due to national shortage of vidaza and then back to vidaza with C24.  - completed cycle 39 on 4/8/2  - due for cycle 40 on 05/02/2022, postponed whiled admitted and to be readdressed as outpatient   - 5/23 message sent to Oncology team, see HPI for details     Essential hypertension  - At home regimen: lisinopril/hctz and coreg  - Per [facility] [place]iscussed with cardiology who recommended switching HCTZ to Chlorthalidone 25mg daily and adding Nifedipine XL 60mg with outpatient follow up. Nifedipine was held d/t hypotension and to allow for some permissible hypertension given clinical status  -  Continue Current anti-HTN  regimen: Lisinopril 40mg daily, Coreg 25mg BID, Chlorthalidone 25mg daily. Nifedipine XL 60mg on  - Will need cardiology f/u outpatient  - 5/23 reduce lisinopril to 20mg daily, BP low to normal      Pancytopenia due to antineoplastic chemotherapy  - Transfuse for hgb < 7 or plts < 10  - Continue acyclovir ppx  - Cefepime and fluconazole d/c'd 5/12 given normal WBC and afebrile   -  Monitor twice weekly CBCs     CAP (community acquired pneumonia)  - Pt with 2 weeks of productive cough, nasal congestion  - Upon arrival to the hospial she was hypoxic requiring 3L NC, weaned to 2L today  - CXR with likely develop consolidation  - CTA shows New patchy consolidative opacities in the in the right upper and posterior right lower lobes.  Findings suggestive infectious etiology such as pneumonia, multifocal pneumonia.  Also consider atypical given patient's reported immunocompromised status.  Aspiration could present similarly.  Consider continued follow-up after acute clinical illness has resolved to ensure resolution. New mediastinal and bilateral hilar lymphadenopathy, likely reactive.  - completed azithromycin and 7 day course of Levaquin on 4/30.  - CXR on admission showing possible developing consolidation: Cefepime 4/30-5/3    - See acute respiratory failure with hypoxia above     Controlled type 2 diabetes mellitus without complication, without long-term current use of insulin  - Will hold home po diabetic medications  -   Accu-Cheks AC/HS, diabetic diet  - Goal bg 140-180  -  Continue on low-dose sliding scale insulin    Insomnia  - Continue nightly melatonin 9 mg qHS, remeron 7.5 mg qHS.       Adjustment disorder with mixed anxiety and depressed mood  - continue home celexa 20 mg daily     CAD (coronary artery disease)  - S/P left carotid endarterectomy prior to cancer dx, s/p PCI (3 stents) >20 years ago   - Continue on ASA 81mg daily, platelets are wnl  - On repatha as outpatient (statin intolerant)    Debility    - Continue with PT/OT for gait training and strengthening and restoration of ADL's   - Encourage mobility, OOB in chair, and early ambulation as appropriate  - Fall precautions   - Monitor for bowel and bladder dysfunction  - Monitor for and prevent skin breakdown and pressure ulcers  - Continue DVT prophylaxis with  frequent ambulation         Anticipate disposition:  Nursing home, PPD and chest x-ray ordered      Follow-up needed during SNF stay- Dr. Valdes 6/2    Appointments not to send patient to- 5/30, 5/31    Follow-up needed after discharge from SNF:   - PCP, hospital follow-up, needs to be scheduled  - Oncology, scheduled for after SNF discharge    Future Appointments   Date Time Provider Department Center   6/27/2022 11:00 AM Claudia Rapp MD [facility] Burak Maxwell NP  Department of Hospital Medicine   [facility]- Skilled Nursing Facility     DOS: 6/10/2022       Patient note was created using MModal Dictation.  Any errors in syntax or even information may not have been identified and edited on initial review prior to signing this note.

## 2022-06-10 NOTE — PLAN OF CARE
Problem: Physical Therapy  Goal: Physical Therapy Goal  Description: PT goals until 6/6/22    1. Pt sit to stand with RW with supervision-Met 6/5/2022  2. Pt to perform gait 200ft with RW with supervision.-not met  Updated 6/1/2022 Pt to perform gait 200ft with RW/rollator with supervision- Met 6/5/2022  3. Pt to go up/down curb step with RW with SBA.-not met  4. Pt to up/down 12 steps with R UE rail with no AD with CGA.-not met  5. Pt to perform B LE exs in sitting or supine x 15 reps to strengthen B LE to improve functional mobility.-not met  6. Pt to propel w/c 100ft with B UE on level surface with mod independent- Met 5/27/2022  Updated 6/2/2022 Pt to propel w/c 200ft with B UE on level surface with mod independent- Met 6/5/2022  7. Pt to  object off the floor with reacher with RW support with set up assist-not met  8. Pt to ambulated 10ft over uneven surface with Rw with supervision-not met  9. Added 5/31/2022 Ascend/Descend ramp with RW with SBA.    Outcome: Ongoing, Progressing

## 2022-06-10 NOTE — NURSING
Called the NP to notify of patient's hemoglobin 5.2. Orders followed to redraw stat type & screen and CBC.

## 2022-06-10 NOTE — PLAN OF CARE
Continue diabetic diet,RD following  Nutrition Goal Status: goal met  Communication of RD Recs: other (comment) (POC)     Assessment and Plan     Moderate malnutrition     Moderate/Severe Protein-Calorie Malnutrition  Malnutrition in the context of Chronic Illness/Injury     Related to (etiology):  Decline in ADL's, taste changes     Signs and Symptoms (as evidenced by):  Energy Intake: <75% of estimated energy requirement for > one month  Body Fat Depletion: mild and moderate depletion of orbital's, triceps and thoracic and lumbar region   Muscle Mass Depletion: mild and moderate depletion of temples, clavicle region, interosseous muscle and lower extremities   Fluid Accumulation: moderate     Interventions(treatment strategy):  Carbohydrate modified diet  Nutrition education 5/19,6/10  Collaboration with other providers

## 2022-06-10 NOTE — PLAN OF CARE
Moderate malnutrition     Moderate/Severe Protein-Calorie Malnutrition  Malnutrition in the context of Chronic Illness/Injury     Related to (etiology):  Decline in ADL's, taste changes     Signs and Symptoms (as evidenced by):  Energy Intake: <75% of estimated energy requirement for > one month  Body Fat Depletion: mild and moderate depletion of orbital's, triceps and thoracic and lumbar region   Muscle Mass Depletion: mild and moderate depletion of temples, clavicle region, interosseous muscle and lower extremities   Fluid Accumulation: moderate     Interventions(treatment strategy):  General diet   Nutrition education 5/19  Collaboration with other providers     Nutrition Diagnosis Status:  Continues

## 2022-06-10 NOTE — PROGRESS NOTES
[facility] - Skilled Nursing  Adult Nutrition  Progress Note    SUMMARY   Recommendations  Continue diabetic diet,RD following  Nutrition Goal Status: goal met  Communication of RD Recs: other (comment) (POC)    Assessment and Plan    Moderate malnutrition    Moderate/Severe Protein-Calorie Malnutrition  Malnutrition in the context of Chronic Illness/Injury     Related to (etiology):  Decline in ADL's, taste changes     Signs and Symptoms (as evidenced by):  Energy Intake: <75% of estimated energy requirement for > one month  Body Fat Depletion: mild and moderate depletion of orbital's, triceps and thoracic and lumbar region   Muscle Mass Depletion: mild and moderate depletion of temples, clavicle region, interosseous muscle and lower extremities   Fluid Accumulation: moderate     Interventions(treatment strategy):  Carbohydrate modified diet  Nutrition education 5/19,6/10  Collaboration with other providers     Nutrition Diagnosis Status:  Continues         Malnutrition Assessment                Skin (Micronutrient): dry, pallor  Hair/Scalp (Micronutrient): dry  Teeth (Micronutrient): broken dentition  Neck/Chest (Micronutrient): muscle wasting  Musculoskeletal/Lower Extremities: muscle wasting       Energy Intake (Malnutrition): less than 75% for greater than or equal to 1 month   Orbital Region (Subcutaneous Fat Loss): moderate depletion  Upper Arm Region (Subcutaneous Fat Loss): mild depletion  Thoracic and Lumbar Region: well nourished   [place] Region (Muscle Loss): moderate depletion  Clavicle Bone Region (Muscle Loss): moderate depletion  Clavicle and Acromion Bone Region (Muscle Loss): moderate depletion  Dorsal Hand (Muscle Loss): moderate depletion  Patellar Region (Muscle Loss): moderate depletion  Anterior Thigh Region (Muscle Loss): moderate depletion  Posterior Calf Region (Muscle Loss): moderate depletion                             Reason for Assessment    Reason For Assessment: RD  "follow-up  Interdisciplinary Rounds: did not attend  General Information Comments: % glucose better, slowing decreasing steroids throughout the summer.  Nutrition Discharge Planning: Dc on diabetic diet    Nutrition/Diet History    Spiritual, Cultural Beliefs, Quaker Practices, Values that Affect Care: no    Anthropometrics    Temp: 98.1 °F (36.7 °C)  Height: 5' 5" (165.1 cm)  Height (inches): 65 in  Weight Method: Standard Scale  Weight: 69.4 kg (153 lb)  Weight (lb): 153 lb  Ideal Body Weight (IBW), Female: 125 lb  % Ideal Body Weight, Female (lb): 116.94 %  BMI (Calculated): 25.5       Lab/Procedures/Meds    Pertinent Labs Reviewed: reviewed  Pertinent Labs Comments: Hg 6.4, HCt 21.8, BUN 35  Pertinent Medications Reviewed: reviewed  Pertinent Medications Comments: prednisone         Estimated/Assessed Needs    Weight Used For Calorie Calculations: 73.5 kg (162 lb 0.6 oz)  Energy Calorie Requirements (kcal): 2020-1506  Energy Need Method: Kcal/kg  Protein Requirements: 81g  Weight Used For Protein Calculations: 73.5 kg (162 lb 0.6 oz)  Fluid Requirements (mL): 1837 or per MD  Estimated Fluid Requirement Method: RDA Method  RDA Method (mL): 1837  CHO Requirement: 229g      Nutrition Prescription Ordered    Current Diet Order: 2000 diabetic  Nutrition Order Comments: glucose improved without oral supplement boost  Oral Nutrition Supplement: -    Evaluation of Received Nutrient/Fluid Intake    I/O: no datat  Energy Calories Required: meeting needs  Protein Required: meeting needs  Fluid Required: meeting needs  Comments: LBM 6/9  Tolerance: tolerating  % Intake of Estimated Energy Needs: 75 - 100 %  % Meal Intake: 75 - 100 %    Nutrition Risk    Level of Risk/Frequency of Follow-up: low (POC)     Monitor and Evaluation    Food and Nutrient Adminstration: diet order  Knowledge/Beliefs/Attitudes: food and nutrition knowledge/skill  Physical Activity and Function: nutrition-related ADLs and " IADLs  Anthropometric Measurements: weight change  Biochemical Data, Medical Tests and Procedures: electrolyte and renal panel, gastrointestinal profile, glucose/endocrine profile, inflammatory profile     Nutrition Follow-Up    RD Follow-up?: Yes

## 2022-06-10 NOTE — PT/OT/SLP PROGRESS
Physical Therapy Treatment    Patient Name:  Susan Puente   MRN:  [id number]  Admit Date: 5/19/2022  Admitting Diagnosis: Acute respiratory failure with hypoxia  Recent Surgeries: N/A    General Precautions: Standard, fall   Orthopedic Precautions:N/A   Braces: N/A     Recommendations:     Discharge Recommendations:  home with home health   Level of Assistance Recommended at Discharge: Intermittent supervision  Discharge Equipment Recommendations: bedside commode, walker, rolling   Barriers to discharge: Decreased caregiver support    Assessment:     Susan Puente is a 71 y.o. female admitted with a medical diagnosis of Acute respiratory failure with hypoxia. Patient tolerated today's session well ambulating an increased distance today with no reports of fatigue. Stair tap exercise reached her challenge point indicating to continue performing strengthening and endurance exercises for her lower extremities. She remains limited in balance, LE strengthening/endurance, and functional mobility. Patient continues to require skilled physical therapy services to address deficits and return patient to [facility].      Performance deficits affecting function: weakness, impaired endurance, impaired self care skills, impaired functional mobilty, gait instability, impaired balance, decreased lower extremity function, decreased ROM, impaired cardiopulmonary response to activity .    Rehab Potential is good    Activity Tolerance: Good    Plan:     Patient to be seen 6 x/week to address the above listed problems via gait training, therapeutic activities, therapeutic exercises, neuromuscular re-education, wheelchair management/training    · Plan of Care Expires: 06/19/22  · Plan of Care Reviewed with: patient    Subjective     Patient reports no pain and is agreeable to participate in therapy today.     Pain/Comfort:  · Pain Rating 1: 0/10  · Pain Rating Post-Intervention 1: 0/10    Patient's cultural, spiritual, Anabaptism  conflicts given the current situation:  · no    Objective:     Communicated with nursing staff prior to session.  Patient found up in chair with  (no lines) upon PT entry to room.     Therapeutic Activities and Exercises:   Stair taps: Performed 2 sets of 3 rounds (30s on/30s off) stair taps on 6 inch step using handrails when necessary. Patient noted exercise was challenging. She reduced speed of taps during 2nd/3rd rounds due to fatigue in quad muscles and to ensure proper technique.  Side steps: Performed x3 laps of outside of // bars with SBA. She required vc to lift LLE instead of sliding side to side.  Donned AFO on LLE with MaxA.   Donned  socks with supervision.    Functional Mobility:  Transfers:     · Sit to Standx multiple trials: supervision with rollator  · Gait: Patient ambulated 2 sets of 143 feet with rollator and supervision. She demonstrated a reciprocal gait pattern with heel/toe steps and no LOB. Wc follow for safety.    AM-PAC 6 CLICK MOBILITY  22    Patient left up in chair with call button in reach and PCT present.    GOALS:   Multidisciplinary Problems     Physical Therapy Goals        Problem: Physical Therapy    Goal Priority Disciplines Outcome Goal Variances Interventions   Physical Therapy Goal     PT, PT/OT Ongoing, Progressing     Description: PT goals until 6/6/22    1. Pt sit to stand with RW with supervision-Met 6/5/2022  2. Pt to perform gait 200ft with RW with supervision.-not met  Updated 6/1/2022 Pt to perform gait 200ft with RW/rollator with supervision- Met 6/5/2022  3. Pt to go up/down curb step with RW with SBA.-not met  4. Pt to up/down 12 steps with R UE rail with no AD with CGA.-not met  5. Pt to perform B LE exs in sitting or supine x 15 reps to strengthen B LE to improve functional mobility.-not met  6. Pt to propel w/c 100ft with B UE on level surface with mod independent- Met 5/27/2022  Updated 6/2/2022 Pt to propel w/c 200ft with B UE on level surface with mod  independent- Met 6/5/2022  7. Pt to  object off the floor with reacher with RW support with set up assist-not met  8. Pt to ambulated 10ft over uneven surface with Rw with supervision-not met  9. Added 5/31/2022 Ascend/Descend ramp with RW with SBA.                     Time Tracking:     PT Received On: 06/10/22  PT Start Time: 1110  PT Stop Time: 1200  PT Total Time (min): 50 min    Billable Minutes: Gait Training 20, Therapeutic Activity 10 and Therapeutic Exercise 20    Treatment Type: Treatment  PT/PTA: PT     PTA Visit Number: 0     06/10/2022

## 2022-06-11 NOTE — PLAN OF CARE
Problem: Adult Inpatient Plan of Care  Goal: Plan of Care Review  Outcome: Ongoing, Progressing  Goal: Patient-Specific Goal (Individualized)  Outcome: Ongoing, Progressing  Goal: Absence of Hospital-Acquired Illness or Injury  Outcome: Ongoing, Progressing  Goal: Optimal Comfort and Wellbeing  Outcome: Ongoing, Progressing  Goal: Readiness for Transition of Care  Outcome: Ongoing, Progressing     Problem: Diabetes Comorbidity  Goal: Blood Glucose Level Within Targeted Range  Outcome: Ongoing, Progressing     Problem: Adjustment to Illness (Sepsis/Septic Shock)  Goal: Optimal Coping  Outcome: Ongoing, Progressing     Problem: Bleeding (Sepsis/Septic Shock)  Goal: Absence of Bleeding  Outcome: Ongoing, Progressing     Problem: Glycemic Control Impaired (Sepsis/Septic Shock)  Goal: Blood Glucose Level Within Desired Range  Outcome: Ongoing, Progressing     Problem: Infection Progression (Sepsis/Septic Shock)  Goal: Absence of Infection Signs and Symptoms  Outcome: Ongoing, Progressing     Problem: Nutrition Impaired (Sepsis/Septic Shock)  Goal: Optimal Nutrition Intake  Outcome: Ongoing, Progressing     Problem: Fluid and Electrolyte Imbalance (Acute Kidney Injury/Impairment)  Goal: Fluid and Electrolyte Balance  Outcome: Ongoing, Progressing     Problem: Oral Intake Inadequate (Acute Kidney Injury/Impairment)  Goal: Optimal Nutrition Intake  Outcome: Ongoing, Progressing     Problem: Renal Function Impairment (Acute Kidney Injury/Impairment)  Goal: Effective Renal Function  Outcome: Ongoing, Progressing     Problem: Fluid Imbalance (Pneumonia)  Goal: Fluid Balance  Outcome: Ongoing, Progressing     Problem: Infection (Pneumonia)  Goal: Resolution of Infection Signs and Symptoms  Outcome: Ongoing, Progressing     Problem: Respiratory Compromise (Pneumonia)  Goal: Effective Oxygenation and Ventilation  Outcome: Ongoing, Progressing     Problem: Infection  Goal: Absence of Infection Signs and Symptoms  Outcome:  Ongoing, Progressing     Problem: Impaired Wound Healing  Goal: Optimal Wound Healing  Outcome: Ongoing, Progressing     Problem: Fall Injury Risk  Goal: Absence of Fall and Fall-Related Injury  Outcome: Ongoing, Progressing     Problem: Skin Injury Risk Increased  Goal: Skin Health and Integrity  Outcome: Ongoing, Progressing     Problem: Malnutrition  Goal: Improved Nutritional Intake  Outcome: Ongoing, Progressing      Passed

## 2022-06-11 NOTE — NURSING
Patient called nurse's station to report that she cut the top of her right great toe while cutting her toenails. Nurse went into patient room to assess. Patient was int he bathroom sitting on the toilet with blood on the floor. Small puncture to top of great right toe. Cleansed with alcohol dn betadine, dressed with and pad and Kerlix. Alyson nurse notified.

## 2022-06-12 NOTE — PT/OT/SLP PROGRESS
"Occupational Therapy   Treatment    Name: Susan Puente  MRN: [id number]  Admit Date: 5/19/2022  Admitting Diagnosis:  Acute respiratory failure with hypoxia    General Precautions: Standard, fall   Orthopedic Precautions:N/A   Braces: N/A     Recommendations:     Discharge Recommendations: home with home health  Level of Assistance Recommended at Discharge: Intermittent assistance for ADL's and homemaking tasks  Discharge Equipment Recommendations:  bedside commode, walker, rolling  Barriers to discharge:  Decreased caregiver support    Assessment:     Susan Puente is a 71 y.o. female with a medical diagnosis of Acute respiratory failure with hypoxia.  She presents with the following performance deficits affecting function: weakness, impaired endurance, impaired self care skills, impaired functional mobilty, gait instability, impaired balance, decreased coordination, decreased lower extremity function, decreased safety awareness, decreased ROM, impaired coordination, impaired cardiopulmonary response to activity. Pt was agreeable to OT and was noted to make progress towards her goals in therapy.  During today's session, pt worked on func mobility and UE therex.  Pt's goals remain appropriate at this time.  She will continue to benefit from skilled OT services in order to assist her with increasing her safety and level of independence with self care and mobility tasks.       Rehab Potential is good    Activity tolerance:  Good    Plan:     Patient to be seen 6 x/week to address the above listed problems via self-care/home management, therapeutic activities, therapeutic exercises    · Plan of Care Expires: 06/20/22  · Plan of Care Reviewed with: patient    Subjective     Communicated with: nurse prior to session.   "I like doing these exercises because I feel the difference it's made with my standing and walking"  Referring to NuStep machine .    Pain/Comfort:  · Pain Rating 1: 0/10  · Pain Rating " Post-Intervention 1: 0/10    Patient's cultural, spiritual, [employer]aritan conflicts given the current situation:  · no    Objective:     Patient found up in w/c folding clothes in closet with  (no active lines) upon OT entry to room.    Bed Mobility:    · N/A     Functional Mobility/Transfers:  · Patient completed Sit <> Stand Transfer with stand by assistance  with  rollator    · Patient completed standing transfer from w/c <-> NuStep machine using rollator with SBA  · Functional Mobility: pt able to propel w/c from room -> therapy gym using B UEs and S.  Pt able to walk ~8' from w/c to NuStep machine using rollator.  Able to lock/unlock rollator brakes for safe transfer.     Activities of Daily Living:  · N/A    [facility] 6 Click ADL: 21    OT Exercises: Pt worked on UE strengthening and endurance exercises to improve her safety and independence during functional activities, such as, w/c mobility, transfers, walking with RW, bed mobility and ADLs  · AROM using a 4# dowel, pt completed 1 set of 15 reps of shoulder flex overhead, chest press, and biceps curls - needed 1 rest break during shoulder press and 2 rest breaks during chest press.   · NuStep: 15 minutes with resistance on 4    Treatment & Education:  · Pt completed UE therex and func mobility activities for tx session as noted above  · Whiteboard updated  · Pt educated on role of OT and POC      Patient left up in chair with call button in reachEducation:      GOALS:   Multidisciplinary Problems     Occupational Therapy Goals        Problem: Occupational Therapy    Goal Priority Disciplines Outcome Interventions   Occupational Therapy Goal     OT, PT/OT Ongoing, Progressing    Description: Goals to be met by: 6/13/22    Patient will increase functional independence with ADLs by performing:    UE Dressing with [place]. = MET  LE Dressing with Modified [place].  Grooming while standing at sink with Supervision.  Toileting from toilet with Supervision  Assistance for hygiene and clothing management.   Bathing from shower chair/bench with Supervision.  Step transfer with Supervision using RW. = MET  Toilet transfer to toilet with Supervision. = MET  Upper extremity exercise program with supervision.                         Time Tracking:     OT Date of Treatment: 06/12/22               Billable Minutes:Therapeutic Activity 10  Therapeutic Exercise 30    6/12/2022

## 2022-06-12 NOTE — PLAN OF CARE
Problem: Occupational Therapy  Goal: Occupational Therapy Goal  Description: Goals to be met by: 6/13/22    Patient will increase functional independence with ADLs by performing:    UE Dressing with [place]. = MET  LE Dressing with Modified [place].  Grooming while standing at sink with Supervision.  Toileting from toilet with Supervision Assistance for hygiene and clothing management.   Bathing from shower chair/bench with Supervision.  Step transfer with Supervision using RW. = MET  Toilet transfer to toilet with Supervision. = MET  Upper extremity exercise program with supervision.        Outcome: Ongoing, Progressing     Pt was agreeable to OT and was noted to make progress towards her goals in therapy.  During today's session, pt worked on func mobility and UE therex.  Pt's goals remain appropriate at this time.  She will continue to benefit from skilled OT services in order to assist her with increasing her safety and level of independence with self care and mobility tasks.     Jana Rosenbaum, OT  6/12/2022

## 2022-06-13 NOTE — PLAN OF CARE
No acute events this shift. Patient up ad deborah and resting comfortably in between; spent most of day in activity room. All needs addressed. Will continue to monitor with frequent rounds and clustered care to promote rest.     Problem: Adult Inpatient Plan of Care  Goal: Plan of Care Review  Outcome: Ongoing, Progressing  Goal: Patient-Specific Goal (Individualized)  Outcome: Ongoing, Progressing  Goal: Absence of Hospital-Acquired Illness or Injury  Outcome: Ongoing, Progressing  Goal: Optimal Comfort and Wellbeing  Outcome: Ongoing, Progressing  Goal: Readiness for Transition of Care  Outcome: Ongoing, Progressing     Problem: Diabetes Comorbidity  Goal: Blood Glucose Level Within Targeted Range  Outcome: Ongoing, Progressing     Problem: Adjustment to Illness (Sepsis/Septic Shock)  Goal: Optimal Coping  Outcome: Ongoing, Progressing     Problem: Bleeding (Sepsis/Septic Shock)  Goal: Absence of Bleeding  Outcome: Ongoing, Progressing     Problem: Glycemic Control Impaired (Sepsis/Septic Shock)  Goal: Blood Glucose Level Within Desired Range  Outcome: Ongoing, Progressing     Problem: Infection Progression (Sepsis/Septic Shock)  Goal: Absence of Infection Signs and Symptoms  Outcome: Ongoing, Progressing     Problem: Nutrition Impaired (Sepsis/Septic Shock)  Goal: Optimal Nutrition Intake  Outcome: Ongoing, Progressing     Problem: Fluid and Electrolyte Imbalance (Acute Kidney Injury/Impairment)  Goal: Fluid and Electrolyte Balance  Outcome: Ongoing, Progressing     Problem: Oral Intake Inadequate (Acute Kidney Injury/Impairment)  Goal: Optimal Nutrition Intake  Outcome: Ongoing, Progressing     Problem: Renal Function Impairment (Acute Kidney Injury/Impairment)  Goal: Effective Renal Function  Outcome: Ongoing, Progressing     Problem: Fluid Imbalance (Pneumonia)  Goal: Fluid Balance  Outcome: Ongoing, Progressing     Problem: Infection (Pneumonia)  Goal: Resolution of Infection Signs and Symptoms  Outcome: Ongoing,  Progressing     Problem: Respiratory Compromise (Pneumonia)  Goal: Effective Oxygenation and Ventilation  Outcome: Ongoing, Progressing     Problem: Infection  Goal: Absence of Infection Signs and Symptoms  Outcome: Ongoing, Progressing     Problem: Impaired Wound Healing  Goal: Optimal Wound Healing  Outcome: Ongoing, Progressing     Problem: Fall Injury Risk  Goal: Absence of Fall and Fall-Related Injury  Outcome: Ongoing, Progressing     Problem: Skin Injury Risk Increased  Goal: Skin Health and Integrity  Outcome: Ongoing, Progressing     Problem: Malnutrition  Goal: Improved Nutritional Intake  Outcome: Ongoing, Progressing

## 2022-06-13 NOTE — PT/OT/SLP PROGRESS
Physical Therapy Treatment    Patient Name:  Susan Puente   MRN:  [id number]  Admit Date: 5/19/2022  Admitting Diagnosis: Acute respiratory failure with hypoxia  Recent Surgeries: N/A    General Precautions: Standard, fall   Orthopedic Precautions:N/A   Braces: N/A     Recommendations:     Discharge Recommendations:  home with home health   Level of Assistance Recommended at Discharge: Intermittent supervision  Discharge Equipment Recommendations: bedside commode, walker, rolling   Barriers to discharge: Decreased caregiver support    Assessment:     Susan Puente is a 71 y.o. female admitted with a medical diagnosis of Acute respiratory failure with hypoxia .    Pt was motivated and cooperative with treatment session. Pt Progressing with PT Intervention. Pt Progressing with improving gait distance. Pt would continue to benefit from skilled PT to address overall functional mobility, goals , and to return to functional baseline.  Goals remain appropriate.      Performance deficits affecting function:  impaired cardiopulmonary response to activity (weakness; impaired endurance; impaired self care skills; impaired functional mobilty; gait instability; impaired balance; decreased coordination; decreased lower extremity function; decreased safety awareness; decreased ROM; impaired coordination) .    Rehab Potential is good    Activity Tolerance: Good    Plan:     Patient to be seen 6 x/week to address the above listed problems via gait training, therapeutic activities, therapeutic exercises, neuromuscular re-education, wheelchair management/training    · Plan of Care Expires: 06/19/22  · Plan of Care Reviewed with: patient    Subjective     I like these exercises.     Pain/Comfort:  · Pain Rating 1: 0/10  · Pain Rating Post-Intervention 1: 0/10    Patient's cultural, spiritual, Lutheran conflicts given the current situation:  · no    Objective:     Communicated with RN prior to session.  Patient found up in  "chair with  (no active lines) upon PT entry to room.     Therapeutic Activities and Exercises:   Standing B LE therex with UE support including heel raises,hip flex, knee flex and partial mini squats x 1 reps.patient required skilled PTA for instruction of exercises and appropriate cues to perform exercises safely, sequencing and appropriately.   Exercises performed to develop and maintain pt's strength, endurance and flexibility.  Updated white board with appropriate PT mobility information for medical team notification  Side steps,tandem ambulation and retrograde ambulation: Performed  With UE wupport with SBA. She required vc to lift LLE instead of sliding side to side.  Donned  Socks and AFO on LLE with MaxA  Functional Mobility:  · Sit to Standx multiple trials: supervision with rollator  · Gait: Patient ambulated  180 ft and 150 feet with rollator and supervision. She demonstrated a reciprocal gait pattern with heel/toe steps and no LOB. Wc follow for safety  · Stairs:  Pt ascended/descended 4 stair(s)  X 2 trials and 4" curb step with Rolling Walker with right handrail with Contact Guard Assistance    ·     AM-PAC 6 CLICK MOBILITY  22    Patient left up in chair with all lines intact, call button in reach and nsg notified.    GOALS:   Multidisciplinary Problems     Physical Therapy Goals        Problem: Physical Therapy    Goal Priority Disciplines Outcome Goal Variances Interventions   Physical Therapy Goal     PT, PT/OT Ongoing, Progressing     Description: PT goals until 6/6/22    1. Pt sit to stand with RW with supervision-Met 6/5/2022  2. Pt to perform gait 200ft with RW with supervision.-not met  Updated 6/1/2022 Pt to perform gait 200ft with RW/rollator with supervision- Met 6/5/2022  3. Pt to go up/down curb step with RW with SBA.-not met  4. Pt to up/down 12 steps with R UE rail with no AD with CGA.-not met  5. Pt to perform B LE exs in sitting or supine x 15 reps to strengthen B LE to improve " functional mobility.-not met  6. Pt to propel w/c 100ft with B UE on level surface with mod independent- Met 5/27/2022  Updated 6/2/2022 Pt to propel w/c 200ft with B UE on level surface with mod independent- Met 6/5/2022  7. Pt to  object off the floor with reacher with RW support with set up assist-not met  8. Pt to ambulated 10ft over uneven surface with Rw with supervision-not met  9. Added 5/31/2022 Ascend/Descend ramp with RW with SBA.                     Time Tracking:     PT Received On: 06/13/22  PT Start Time: 1031  PT Stop Time: 1125  PT Total Time (min): 54 min    Billable Minutes: Gait Training 15, Therapeutic Activity 9 and Therapeutic Exercise 30    Treatment Type: Treatment  PT/PTA: PTA     PTA Visit Number: 1     06/13/2022

## 2022-06-13 NOTE — PT/OT/SLP PROGRESS
"Occupational Therapy   Treatment    Name: Susan Puente  MRN: [id number]  Admit Date: 5/19/2022  Admitting Diagnosis:  Acute respiratory failure with hypoxia    General Precautions: Standard, fall   Orthopedic Precautions:N/A   Braces: N/A     Recommendations:     Discharge Recommendations: home with home health  Level of Assistance Recommended at Discharge: Intermittent assistance for ADL's and homemaking tasks  Discharge Equipment Recommendations:  bedside commode, walker, rolling  Barriers to discharge:  Decreased caregiver support    Assessment:     Susan Puente is a 71 y.o. female with a medical diagnosis of Acute respiratory failure with hypoxia.  Pt had good tolerance of session, which focused on lower body dressing and bed mobility.  She would continue to benefit from skilled OT services at SNF to maximize her gains in functional independence and to ensure her safety upon d/c.  She presents with the following. Performance deficits affecting function are weakness, impaired endurance, impaired self care skills, impaired functional mobilty, gait instability, impaired balance, decreased safety awareness.     Rehab Potential is good    Activity tolerance:  Good    Plan:     Patient to be seen 6 x/week to address the above listed problems via self-care/home management, therapeutic activities, therapeutic exercises    · Plan of Care Expires: 06/20/22  · Plan of Care Reviewed with: patient    Subjective   "I really want to work on getting out of bed.  I won't have a hospital bed where I'm going."  Communicated with: nursing prior to session.     Pain/Comfort:  · Pain Rating 1: 0/10  · Pain Rating Post-Intervention 1: 0/10    Patient's cultural, spiritual, Roman Catholic conflicts given the current situation:  · no    Objective:     Patient found up in chair in activity room working on an art project with Other (comments) (no active lines) with  present upon OT entry to room.    Bed " Mobility:  (performed with no bed rail and bed flat)  · Patient completed Rolling/Turning to Left x 2 trials with stand by assistance  · Patient completed Supine to Sit x 2 trials with stand by assistance  · Patient completed Sit to Supine x 2 trials with stand by assistance     Functional Mobility/Transfers:  · Patient completed Chair <> Bed and then Bed <> Chair Transfer x 2 trials each using Stand Pivot technique with stand by assistance with no assistive device  · Pt self-propelled her w/c from her room to the activity room (130 ft) with SBA.     Activities of Daily Living:  · Grooming: Setup assistance for pt to wash her hands while seated at the sink.   · Lower Body Dressing: stand by assistance to doff dirty pants/julius clean ones at w/c level.  Setup assistance to clean paint off both of her shoes while seated at the sink.  Pt had accidentally spilled paint onto the floor and herself in the activity room.    [facility] 6 Click ADL: 21      Treatment & Education:  Pt edu on role of OT, POC, safety when performing self care tasks, benefit of performing OOB activity, and safety when performing functional transfers and mobility.  - White board updated  - Self care tasks completed-- as noted above       Patient left up in chair in the activity room with  presentEducation:      GOALS:   Multidisciplinary Problems     Occupational Therapy Goals        Problem: Occupational Therapy    Goal Priority Disciplines Outcome Interventions   Occupational Therapy Goal     OT, PT/OT Ongoing, Progressing    Description: Goals to be met by: 6/13/22    Patient will increase functional independence with ADLs by performing:    UE Dressing with [place]. = MET  LE Dressing with Modified [place].  Grooming while standing at sink with Supervision.  Toileting from toilet with Supervision Assistance for hygiene and clothing management.   Bathing from shower chair/bench with Supervision.  Step transfer with  Supervision using RW. = MET  Toilet transfer to toilet with Supervision. = MET  Upper extremity exercise program with supervision.                         Time Tracking:     OT Date of Treatment: 06/13/22  OT Start Time: 1426    OT Stop Time: 1500  OT Total Time (min): 34 min    Billable Minutes:Self Care/Home Management 17 min  Therapeutic Activity 17 min    6/13/2022

## 2022-06-14 PROBLEM — Z79.52 LONG TERM (CURRENT) USE OF SYSTEMIC STEROIDS: Status: ACTIVE | Noted: 2022-01-01

## 2022-06-14 NOTE — PLAN OF CARE
Problem: Adult Inpatient Plan of Care  Goal: Plan of Care Review  Outcome: Ongoing, Progressing POC reviewed with patient. She arrived from ED transferred [facility]. She is here for PRBC transfusion. We are currently waiting on second unit for infusion. See note. VSS and afebrile . Patient assist oob. She will call for assistance .   Bed is low locked position call light is in place .

## 2022-06-14 NOTE — ASSESSMENT & PLAN NOTE
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   Admission Hgb: 4.9  Baseline Hgb: 6.5-7.5  2 units of blood ordered in ED, blood bank shortage. Patient getting 1, pending second.  - Daily CBC  - Goal hgb of around baseline

## 2022-06-14 NOTE — PLAN OF CARE
Problem: Adult Inpatient Plan of Care  Goal: Plan of Care Review  Outcome: Ongoing, Progressing  Goal: Patient-Specific Goal (Individualized)  Outcome: Ongoing, Progressing     Problem: Diabetes Comorbidity  Goal: Blood Glucose Level Within Targeted Range  Outcome: Ongoing, Not Progressing     Problem: Fluid Imbalance (Pneumonia)  Goal: Fluid Balance  Outcome: Ongoing, Progressing     Problem: Infection (Pneumonia)  Goal: Resolution of Infection Signs and Symptoms  Outcome: Ongoing, Progressing     Problem: Infection  Goal: Absence of Infection Signs and Symptoms  Outcome: Ongoing, Progressing     Problem: Impaired Wound Healing  Goal: Optimal Wound Healing  Outcome: Ongoing, Progressing     Problem: Fall Injury Risk  Goal: Absence of Fall and Fall-Related Injury  Outcome: Ongoing, Progressing     Problem: Skin Injury Risk Increased  Goal: Skin Health and Integrity  Outcome: Ongoing, Progressing     Problem: Malnutrition  Goal: Improved Nutritional Intake  Outcome: Ongoing, Progressing

## 2022-06-14 NOTE — PROGRESS NOTES
Admitted from SNF with [place] area open ,sacral pad applied aliciaetn had inflated chair pad with her to keep pressure off area . Wound care consult ordered

## 2022-06-14 NOTE — PT/OT/SLP PROGRESS
"Occupational Therapy      Patient Name:  Susan Puente   MRN:  [id number]    Patient not seen today secondary to transferred to main [place] ED due to "low blood" for H&H of 4.9/6.6. Will follow-up if/when appropriate.    6/14/2022  "

## 2022-06-14 NOTE — SUBJECTIVE & OBJECTIVE
Patient information was obtained from patient, past medical records, and ER records.     Oncology History: (from Dr. Valdes's clinic note):   71 year old female with hx of DM2, peripheral vascular disease, tobacco use, CAD, hyperlipidemia, hypertension who was hospitalized 11/10/16 for anemia. hgb 5.3  MCH 43.4 with normal WBC and platelets. Patient had normal iron stores. She was transfused 3 units of PRBCs and discharged home with hgb 8.7 on 11/11/16. She was referred for further evalutation of her anemia. On 12/16/16 patient had a normal SPEP and immunofixation. Slightly elevated kappa light chains with normal ration. Elevated vitamin b12, normal folate, JAYDEN was positive with a low titer and negative profile. Patient had a bone marrow biopsy 1/5/16 which showed the core biopsy is normocellular for age (40%); however, megakaryocytes are increased and show frequent small, hypolobated forms. Additionally, a subset of the neutrophils are hypogranular. Blasts are not increased by either morphology (1.2%) or in the corresponding flow cytometric analysis. Fish detects a 5q deletion in 54.5% of nuclei. Cytogenetics reported 20 metaphases, 2 metaphases were normal and 18 metaphases had a 5q deletion. No additional cytogenetic abnormalities were detected. Findings consistent with 5q deletion syndrome.     Patient had a delay in obtaining Revlimid 10 mg daily but did start taking the medication 2/8/17. On 2/9/17 patient developed a diffuse maculopapular rash throughout scalp arms, legs and torso. She states she had no stridor or wheezing. She discontinued medication 2/15/17. Went to allergist who provided references on desensitization so that patient could resume Revlimid. Unfortunately developed pancytopenia and Revlimid stopped 6/16/17. She had a repeat BMBX  performed 6/8/17 and showed a hypercellular marrow (60%) continued atypia in the granulocytes and megakaryocytes were noted.  Additionally, there is erythroid  atypia present. No increase in blasts. Cytogenetics are normal and MDS FISH is negative, failing to show 5 q minus. NGS should no significant molecular mutations.  Anemia work up revealed bienvenido negative hemolysis.     Revlimid has been stopped since June 2017 after she developed pancytopenia while on Revlimid (required desensitization). CBC was normal for almost 1 year and marrow was negative for del5q. Bone marrow biopsy repeat from 6/2018 showed relapsed 5q minus MDS without new or additional mutations. Revlimid restarted at 2.5 mg daily with prednisone to prevent allergic reaction. Titrated to a goal of 10mg daily Revlimid. Hospital admission 3/12-3/17/19 for unresponsive event after blood transfusion, found to be profoundly pancytopenic at admission. Since hospital admission Revlimid has been stopped. Repeat marrow March 2019 shows persistent MDS with 5q minus.      Started cycle 1 Vidaza 5/6/19 subq for 5 days every 28 days. Restaging bone marrow biopsy from 10/24/19 shows persistent MDS, no excess blasts and 3 of 20 metaphases with 5q deletion.      (Not in a hospital admission)      Revlimid [lenalidomide]     Past Medical History:   Diagnosis Date    Allergic rhinitis     Allergy     Anemia     Anxiety     CAD (coronary artery disease)     Carotid stenosis     Colon polyps 2016    Controlled type 2 diabetes mellitus without complication, without long-term current use of insulin 10/19/2016    Depression     GERD (gastroesophageal reflux disease)     Hearing loss in right ear     Hx of psychiatric care     Celexa, Prozac    Hypokalemia 2/3/2020    MDS (myelodysplastic syndrome) with 5q deletion     Psychiatric problem     Therapy      Past Surgical History:   Procedure Laterality Date    BONE MARROW BIOPSY Left 6/7/2018    Procedure: Biopsy-bone marrow;  Surgeon: Gita Valdes MD;  Location: [facility] OR [address] [place] [address];  Service: Oncology;  Laterality: Left;    BONE MARROW BIOPSY Left 3/21/2019    Procedure: Biopsy-bone  marrow;  Surgeon: Gita Valdes MD;  [facility];  Service: Oncology;  Laterality: Left;    BONE MARROW BIOPSY Left 10/24/2019    Procedure: Biopsy-bone marrow;  Surgeon: Gita Valdes MD;  [facility];  Service: Oncology;  Laterality: Left;  left iliac crest    BONE MARROW BIOPSY Left 2021    Procedure: Biopsy-bone marrow;  Surgeon: Gita Valdes MD;  Location: [facility] ENDO (4TH FLR);  Service: Oncology;  Laterality: Left;    BUNIONECTOMY      CAROTID ENDARTERECTOMY Left     CAROTID STENT      COLONOSCOPY N/A 2016    Procedure: COLONOSCOPY;  Surgeon: PATRICIA Simpson MD;  Location: [facility] ENDO (4TH FLR);  Service: Endoscopy;  Laterality: N/A;  pt has vomiting with anesthesia in past    ENDOMETRIAL ABLATION      for endometriosis    INSERTION OF TUNNELED CENTRAL VENOUS CATHETER (CVC) WITH SUBCUTANEOUS PORT N/A 2020    Procedure: [id number]-O-CATH;  Surgeon: Dosc Diagnostic Provider;  [address]  Service: Radiology;  Laterality: N/A;  189    TONSILLECTOMY      TYMPANOSTOMY TUBE PLACEMENT       Family History       Problem Relation (Age of Onset)    Alcohol abuse Father, Brother    Cancer Paternal Grandmother    Heart disease Mother, Father    Hyperlipidemia Mother          Tobacco Use    Smoking status: Former Smoker     Packs/day: 1.50     Years: 50.00     Pack years: 75.00     Types: Cigarettes, Vaping with nicotine     Quit date: 3/3/2017     Years since quittin.2    Smokeless tobacco: Never Used   Substance and Sexual Activity    Alcohol use: No    Drug use: No    Sexual activity: Not on file       Review of Systems   Constitutional:  Negative for appetite change and fever.   HENT:  Negative for congestion and sinus pain.    Eyes:  Negative for discharge and redness.   Respiratory:  Negative for cough and shortness of breath.    Cardiovascular:  Negative for chest pain and leg swelling.   Gastrointestinal:  Negative for diarrhea and nausea.   Endocrine:  Negative for polydipsia and polyuria.   Genitourinary:  Negative for dysuria and hematuria.   Musculoskeletal:  Negative for arthralgias and myalgias.   Skin:  Negative for color change and rash.   Neurological:  Negative for weakness and numbness.   Psychiatric/Behavioral:  Negative for agitation and confusion.    Objective:     Vital Signs (Most Recent):  Temp: 98.4 °F (36.9 °C) (06/14/22 1332)  Pulse: 79 (06/14/22 1403)  Resp: 16 (06/14/22 1332)  BP: (!) 162/69 (06/14/22 1403)  SpO2: 97 % (06/14/22 1333)   Vital Signs (24h Range):  Temp:  [97.5 °F (36.4 °C)-99 °F (37.2 °C)] 98.4 °F (36.9 °C)  Pulse:  [72-90] 79  Resp:  [16-19] 16  SpO2:  [92 %-97 %] 97 %  BP: (105-173)/(59-74) 162/69     Weight: 69.3 kg (152 lb 12.5 oz)  Body mass index is 25.42 kg/m².  Body surface area is 1.78 meters squared.    ECOG SCORE           [unfilled]    Lines/Drains/Airways       Central Venous Catheter Line  Duration             Port A Cath Single Lumen 05/31/22 1144 right subclavian 14 days              Drain  Duration             Female External Urinary Catheter 04/30/22 1740 44 days                    Physical Exam  Constitutional:       General: She is not in acute distress.     Appearance: Normal appearance.   HENT:      Head: Normocephalic and atraumatic.      Mouth/Throat:      Mouth: Mucous membranes are moist.      Pharynx: Oropharynx is clear.   Eyes:      Conjunctiva/sclera: Conjunctivae normal.      Pupils: Pupils are equal, round, and reactive to light.   Cardiovascular:      Rate and Rhythm: Normal rate and regular rhythm.      Pulses: Normal pulses.      Heart sounds: Normal heart sounds.   Pulmonary:      Effort: Pulmonary effort is normal.      Breath sounds: Normal breath sounds.   Abdominal:      General: Abdomen is flat.      Palpations: Abdomen is soft.   Musculoskeletal:         General: No swelling or deformity.      Cervical back: Normal range of motion and neck supple. No tenderness.   Skin:     General: Skin  is warm and dry.   Neurological:      General: No focal deficit present.      Mental Status: She is alert and oriented to person, place, and time.   Psychiatric:         Mood and Affect: Mood normal.         Behavior: Behavior normal.       Significant Labs:   All pertinent labs from the last 24 hours have been reviewed.    Diagnostic Results:  I have reviewed all pertinent imaging results/findings within the past 24 hours.

## 2022-06-14 NOTE — ASSESSMENT & PLAN NOTE
Patient with hypoxemic respiratory failure thought to be inflammatory on prior admission after non-infectious bronchoscopy. Pulm sent her home with 60mg PO qd for 2 weeks and a taper by 10mg daily every 2 weeks.   - Continue Prednisone 40mg PO qd for now (11d)    Prednisone Schedule (based on pulm recs last admission):  5/12-5/26: 60mg qd  5/27-6/9: 50mg qd  6/10-6/23: 40mg qd  6/24-7/7: 30mg qd  7/8-7/21: 20mg qd  7/22-8/4: 10mg qd  8/5-8/11: 5mg qd

## 2022-06-14 NOTE — H&P
[facility] - Emergency Dept  Hematology  Bone Marrow Transplant  H&P    Subjective:     Principal Problem: Anemia requiring transfusions    HPI: Susan Puente is a 71yoF who follows with Dr. Valdes for MDS on treatment with azacitidine (currently receiving decitabine due to national azacitidine shortage), DM2, HTN, CAD, gout, anxiety, and depression who presents to the hospital from SNF with Hgb 4.9 on labwork. She has had 2 recent hospitalizations since April, both for pneumonia and related hypoxemic respiratory failure. She is no longer on treatment dose antibiotics, but was discharged from both hospitalizations to SNF for weakness and deconditioning. Her last discharge was on 5/19. She states she has been asymptomatic for anemia, but Hgb 4.9 is lower than her baseline around 6.5-7.5. She denies SOB, VENEGAS, CP, lightheadedness, fatigue, or weakness. No bleeding sources. She was scheduled originally for her 40th cycle of azacitidine on 5/2/22, but it has been delayed by hospitalizations and stays at SNF. Given 1u (second unit ordered but blood bank attempting to get a match) RBCs in ED and admitted to BMT service.       Patient information was obtained from patient, past medical records, and ER records.     Oncology History: (from Dr. Valdes's clinic note):   71 year old female with hx of DM2, peripheral vascular disease, tobacco use, CAD, hyperlipidemia, hypertension who was hospitalized 11/10/16 for anemia. hgb 5.3  MCH 43.4 with normal WBC and platelets. Patient had normal iron stores. She was transfused 3 units of PRBCs and discharged home with hgb 8.7 on 11/11/16. She was referred for further evalutation of her anemia. On 12/16/16 patient had a normal SPEP and immunofixation. Slightly elevated kappa light chains with normal ration. Elevated vitamin b12, normal folate, JAYDEN was positive with a low titer and negative profile. Patient had a bone marrow biopsy 1/5/16 which showed the core biopsy is normocellular  for age (40%); however, megakaryocytes are increased and show frequent small, hypolobated forms. Additionally, a subset of the neutrophils are hypogranular. Blasts are not increased by either morphology (1.2%) or in the corresponding flow cytometric analysis. Fish detects a 5q deletion in 54.5% of nuclei. Cytogenetics reported 20 metaphases, 2 metaphases were normal and 18 metaphases had a 5q deletion. No additional cytogenetic abnormalities were detected. Findings consistent with 5q deletion syndrome.     Patient had a delay in obtaining Revlimid 10 mg daily but did start taking the medication 2/8/17. On 2/9/17 patient developed a diffuse maculopapular rash throughout scalp arms, legs and torso. She states she had no stridor or wheezing. She discontinued medication 2/15/17. Went to allergist who provided references on desensitization so that patient could resume Revlimid. Unfortunately developed pancytopenia and Revlimid stopped 6/16/17. She had a repeat BMBX  performed 6/8/17 and showed a hypercellular marrow (60%) continued atypia in the granulocytes and megakaryocytes were noted.  Additionally, there is erythroid atypia present. No increase in blasts. Cytogenetics are normal and MDS FISH is negative, failing to show 5 q minus. NGS should no significant molecular mutations.  Anemia work up revealed bienvenido negative hemolysis.     Revlimid has been stopped since June 2017 after she developed pancytopenia while on Revlimid (required desensitization). CBC was normal for almost 1 year and marrow was negative for del5q. Bone marrow biopsy repeat from 6/2018 showed relapsed 5q minus MDS without new or additional mutations. Revlimid restarted at 2.5 mg daily with prednisone to prevent allergic reaction. Titrated to a goal of 10mg daily Revlimid. Hospital admission 3/12-3/17/19 for unresponsive event after blood transfusion, found to be profoundly pancytopenic at admission. Since hospital admission Revlimid has been  stopped. Repeat marrow March 2019 shows persistent MDS with 5q minus.      Started cycle 1 Vidaza 5/6/19 subq for 5 days every 28 days. Restaging bone marrow biopsy from 10/24/19 shows persistent MDS, no excess blasts and 3 of 20 metaphases with 5q deletion.      (Not in a hospital admission)      Revlimid [lenalidomide]     Past Medical History:   Diagnosis Date    Allergic rhinitis     Allergy     Anemia     Anxiety     CAD (coronary artery disease)     Carotid stenosis     Colon polyps 2016    Controlled type 2 diabetes mellitus without complication, without long-term current use of insulin 10/19/2016    Depression     GERD (gastroesophageal reflux disease)     Hearing loss in right ear     Hx of psychiatric care     Celexa, Prozac    Hypokalemia 2/3/2020    MDS (myelodysplastic syndrome) with 5q deletion     Psychiatric problem     Therapy      Past Surgical History:   Procedure Laterality Date    BONE MARROW BIOPSY Left 6/7/2018    Procedure: Biopsy-bone marrow;  Surgeon: Gita Valdes MD;  [facility];  Service: Oncology;  Laterality: Left;    BONE MARROW BIOPSY Left 3/21/2019    Procedure: Biopsy-bone marrow;  Surgeon: Gita Valdes MD;  [facility];  Service: Oncology;  Laterality: Left;    BONE MARROW BIOPSY Left 10/24/2019    Procedure: Biopsy-bone marrow;  Surgeon: Gita Valdes MD;  [facility];  Service: Oncology;  Laterality: Left;  left iliac crest    BONE MARROW BIOPSY Left 4/21/2021    Procedure: Biopsy-bone marrow;  Surgeon: Gita Valdes MD;  Location: [facility] ENDO (4TH FLR);  Service: Oncology;  Laterality: Left;    BUNIONECTOMY      CAROTID ENDARTERECTOMY Left 2011    CAROTID STENT      COLONOSCOPY N/A 12/20/2016    Procedure: COLONOSCOPY;  Surgeon: PATRICIA Simpson MD;  Location: [facility] ENDO (4TH FLR);  Service: Endoscopy;  Laterality: N/A;  pt has vomiting with anesthesia in past    ENDOMETRIAL ABLATION      for endometriosis    INSERTION  OF TUNNELED CENTRAL VENOUS CATHETER (CVC) WITH SUBCUTANEOUS PORT N/A 2020    Procedure: [id number];  Surgeon: Deepa Diagnostic Provider;  Location: [facility] OR [address] [place] [address];  Service: Radiology;  Laterality: N/A;  189    TONSILLECTOMY      TYMPANOSTOMY TUBE PLACEMENT       Family History       Problem Relation (Age of Onset)    Alcohol abuse Father, Brother    Cancer Paternal Grandmother    Heart disease Mother, Father    Hyperlipidemia Mother          Tobacco Use    Smoking status: Former Smoker     Packs/day: 1.50     Years: 50.00     Pack years: 75.00     Types: Cigarettes, Vaping with nicotine     Quit date: 3/3/2017     Years since quittin.2    Smokeless tobacco: Never Used   Substance and Sexual Activity    Alcohol use: No    Drug use: No    Sexual activity: Not on file       Review of Systems   Constitutional:  Negative for appetite change and fever.   HENT:  Negative for congestion and sinus pain.    Eyes:  Negative for discharge and redness.   Respiratory:  Negative for cough and shortness of breath.    Cardiovascular:  Negative for chest pain and leg swelling.   Gastrointestinal:  Negative for diarrhea and nausea.   Endocrine: Negative for polydipsia and polyuria.   Genitourinary:  Negative for dysuria and hematuria.   Musculoskeletal:  Negative for arthralgias and myalgias.   Skin:  Negative for color change and rash.   Neurological:  Negative for weakness and numbness.   Psychiatric/Behavioral:  Negative for agitation and confusion.    Objective:     Vital Signs (Most Recent):  Temp: 98.4 °F (36.9 °C) (22 1332)  Pulse: 79 (22 1403)  Resp: 16 (22 1332)  BP: (!) 162/69 (22 1403)  SpO2: 97 % (22 1333)   Vital Signs (24h Range):  Temp:  [97.5 °F (36.4 °C)-99 °F (37.2 °C)] 98.4 °F (36.9 °C)  Pulse:  [72-90] 79  Resp:  [16-19] 16  SpO2:  [92 %-97 %] 97 %  BP: (105-173)/(59-74) 162/69     Weight: 69.3 kg (152 lb 12.5 oz)  Body mass index is 25.42 kg/m².  Body  surface area is 1.78 meters squared.    ECOG SCORE           [unfilled]    Lines/Drains/Airways       Central Venous Catheter Line  Duration             Port A Cath Single Lumen 05/31/22 1144 right subclavian 14 days              Drain  Duration             Female External Urinary Catheter 04/30/22 1740 44 days                    Physical Exam  Constitutional:       General: She is not in acute distress.     Appearance: Normal appearance.   HENT:      Head: Normocephalic and atraumatic.      Mouth/Throat:      Mouth: Mucous membranes are moist.      Pharynx: Oropharynx is clear.   Eyes:      Conjunctiva/sclera: Conjunctivae normal.      Pupils: Pupils are equal, round, and reactive to light.   Cardiovascular:      Rate and Rhythm: Normal rate and regular rhythm.      Pulses: Normal pulses.      Heart sounds: Normal heart sounds.   Pulmonary:      Effort: Pulmonary effort is normal.      Breath sounds: Normal breath sounds.   Abdominal:      General: Abdomen is flat.      Palpations: Abdomen is soft.   Musculoskeletal:         General: No swelling or deformity.      Cervical back: Normal range of motion and neck supple. No tenderness.   Skin:     General: Skin is warm and dry.   Neurological:      General: No focal deficit present.      Mental Status: She is alert and oriented to person, place, and time.   Psychiatric:         Mood and Affect: Mood normal.         Behavior: Behavior normal.       Significant Labs:   All pertinent labs from the last 24 hours have been reviewed.    Diagnostic Results:  I have reviewed all pertinent imaging results/findings within the past 24 hours.    Assessment/Plan:     * Anemia requiring transfusions  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   Admission Hgb: 4.9  Baseline Hgb: 6.5-7.5  2 units of blood ordered in ED, blood bank  shortage. Patient getting 1, pending second.  - Daily CBC  - Goal hgb of around baseline    MDS (myelodysplastic syndrome) with 5q deletion  Due for cycle 40 of azacitidine on 5/2/22. Delayed due to multiple PNA admissions, needs for SNF.   Pending PT placement recs (HH vs SNF), will plan heme onc f/u on discharge for assessment for resumption of chemo.     Long term (current) use of systemic steroids  Patient with hypoxemic respiratory failure thought to be inflammatory on prior admission after non-infectious bronchoscopy. Pulm sent her home with 60mg PO qd for 2 weeks and a taper by 10mg daily every 2 weeks.   - Continue Prednisone 40mg PO qd for now (11d)    Prednisone Schedule (based on pulm recs last admission):  5/12-5/26: 60mg qd  5/27-6/9: 50mg qd  6/10-6/23: 40mg qd  6/24-7/7: 30mg qd  7/8-7/21: 20mg qd  7/22-8/4: 10mg qd  8/5-8/11: 5mg qd    Physical debility  PT/OT eval for placement  SNF PT recommended home with  PT/OT, will get assessment in AM    Controlled type 2 diabetes mellitus without complication, without long-term current use of insulin  Home regimen: none  Last A1c: 5.8% on 5/20/22  - Diabetic diet  - Glucose 112 on admission on prednisone daily  - LDSSI for now. No scheduled    Essential hypertension  Continue home coreg 25mg BID, chlorthalidone 25mg qd, lisinopril 20mg qd, nifedipine 60mg qd    CAD (coronary artery disease)  Continue ASA, BB  No chest pain        VTE Risk Mitigation (From admission, onward)         Ordered     enoxaparin injection 40 mg  Daily         06/14/22 1208     IP VTE HIGH RISK PATIENT  Once         06/14/22 1208     Place sequential compression device  Until discontinued         06/14/22 1208                Disposition: Pending PT/OT recs    Connor M Gillies, MD  Bone Marrow Transplant  Hematology  [facility]

## 2022-06-14 NOTE — ED NOTES
Per Blood Bank, pt has antibody-specific blood. One unit in house, the other has to come from [place]. MD aware.

## 2022-06-14 NOTE — PT/OT/SLP PROGRESS
Physical Therapy      Patient Name:  Susan Puente   MRN:  [id number]    Patient not seen today secondary to being sent to ED for anemia  . Will follow-up as scheduled.

## 2022-06-14 NOTE — PROGRESS NOTES
[facility] - Emergency Dept  Wound Care    Patient Name:  Susan Puente   MRN:  [id number]  Date: 2022  Diagnosis: <principal problem not specified>    History:     Past Medical History:   Diagnosis Date    Allergic rhinitis     Allergy     Anemia     Anxiety     CAD (coronary artery disease)     Carotid stenosis     Colon polyps     Controlled type 2 diabetes mellitus without complication, without long-term current use of insulin 10/19/2016    Depression     GERD (gastroesophageal reflux disease)     Hearing loss in right ear     Hx of psychiatric care     Celexa, Prozac    Hypokalemia 2/3/2020    MDS (myelodysplastic syndrome) with 5q deletion     Psychiatric problem     Therapy        Social History     Socioeconomic History    Marital status: Single   Tobacco Use    Smoking status: Former Smoker     Packs/day: 1.50     Years: 50.00     Pack years: 75.00     Types: Cigarettes, Vaping with nicotine     Quit date: 3/3/2017     Years since quittin.2    Smokeless tobacco: Never Used   Substance and Sexual Activity    Alcohol use: No    Drug use: No   Other Topics Concern    Patient feels they ought to cut down on drinking/drug use No    Patient annoyed by others criticizing their drinking/drug use No    Patient has felt bad or guilty about drinking/drug use No    Patient has had a drink/used drugs as an eye opener in the AM No   Social History Narrative    Single, never , no children, former , [facility]     Social Determinants of Health     Financial Resource Strain: Low Risk     Difficulty of Paying Living Expenses: Not hard at all   Food Insecurity: No Food Insecurity    Worried About Running Out of Food in the Last Year: Never true    Ran Out of Food in the Last Year: Never true   Transportation Needs: No Transportation Needs    Lack of Transportation (Medical): No    Lack of Transportation (Non-Medical): No   Physical Activity: Inactive    Days of Exercise per Week: 0 days     Minutes of Exercise per Session: 0 min   Stress: No Stress Concern Present    Feeling of Stress : Only a little   Social Connections: Moderately Isolated    Frequency of Communication with Friends and Family: More than three times a week    Frequency of Social Gatherings with Friends and Family: More than three times a week    Attends [employer] Services: More than 4 times per year    Active Member of Clubs or Organizations: No    Attends Club or Organization Meetings: Never    Marital Status: Never    Housing Stability: Low Risk     Unable to Pay for Housing in the Last Year: No    Number of Places Lived in the Last Year: 1    Unstable Housing in the Last Year: No       Precautions:     Allergies as of 06/14/2022 - Reviewed 06/14/2022   Allergen Reaction Noted    Revlimid [lenalidomide] Swelling 06/08/2017       WOC Assessment Details/Treatment   Wound care follow-up  Ms. Puente is going off-grounds.  She is able to stand at bedside with walker.  The left coccyx has a partial thickness skin loss, no drainage, no erythema,  Wound care discussed, verbalized understanding.     Plan  Left coccyx- continue triad- cleanse with sea clens    Nursing to continue care, pressure prevention measures  Wound care will follow- up prn     Recommendations made to primary team for above plan per written report . Orders placed.      06/14/22 0800        Altered Skin Integrity 05/25/22 0750 Left Coccyx Incontinence associated dermatitis   Date First Assessed/Time First Assessed: 05/25/22 0750   Altered Skin Integrity Present on Admission: yes  Side: Left  Location: Coccyx  Primary Wound Type: Incontinence associated dermatitis   Dressing Appearance Open to air   Drainage Amount None   Drainage Characteristics/Odor No odor   Appearance Pink;Moist   Tissue loss description Partial thickness   Periwound Area Intact;Dry;Pink   Wound Edges Open;Irregular   Wound Length (cm) 0.4 cm   Wound Width (cm) 0.2 cm   Wound Depth (cm) 0.2  cm   Wound Volume (cm^3) 0.016 cm^3   Wound Surface Area (cm^2) 0.08 cm^2   Care Applied:;Skin Barrier   Skin Interventions   Device Skin Pressure Protection pressure points protected;positioning supports utilized   Pressure Reduction Devices chair cushion utilized;positioning supports utilized;pressure-redistributing mattress utilized   Pressure Reduction Techniques frequent weight shift encouraged;heels elevated off bed;positioned off wounds;pressure points protected   Skin Protection incontinence pads utilized     06/14/2022

## 2022-06-14 NOTE — ED PROVIDER NOTES
Encounter Date: 6/14/2022       History     Chief Complaint   Patient presents with    Abdormal lab     Coming from [facility] skilled for low H&H. 4.9/6.6. Denies dizziness, lightheadedness, or SOB.     Patient is a 71-year-old female with history of myelodysplastic syndrome, diabetes, anemia, anxiety, CAD who presents to the emergency department from [facility] over anemia.  Patient's morning lab was concerning for a hemoglobin of 4.9.  Patient currently reports that she is asymptomatic.  Patient reports that she does not feel short of breath, dizziness, headache, vision changes at this time.  Patient denies any hematochezia, hematemesis.  Patient denies any dark black stools.        Review of patient's allergies indicates:   Allergen Reactions    Revlimid [lenalidomide] Swelling     Facial swelling  6/8/17 - in the process of desensitation     Past Medical History:   Diagnosis Date    Allergic rhinitis     Allergy     Anemia     Anxiety     CAD (coronary artery disease)     Carotid stenosis     Colon polyps 2016    Controlled type 2 diabetes mellitus without complication, without long-term current use of insulin 10/19/2016    Depression     GERD (gastroesophageal reflux disease)     Hearing loss in right ear     Hx of psychiatric care     Celexa, Prozac    Hypokalemia 2/3/2020    MDS (myelodysplastic syndrome) with 5q deletion     Psychiatric problem     Therapy      Past Surgical History:   Procedure Laterality Date    BONE MARROW BIOPSY Left 6/7/2018    Procedure: Biopsy-bone marrow;  Surgeon: Gita Valdes MD;  Location: [address];  Service: Oncology;  Laterality: Left;    BONE MARROW BIOPSY Left 3/21/2019    Procedure: Biopsy-bone marrow;  Surgeon: Gita Valdes MD;  Location: [facility] OR [address] [place] [address];  Service: Oncology;  Laterality: Left;    BONE MARROW BIOPSY Left 10/24/2019    Procedure: Biopsy-bone marrow;  Surgeon: Gita Valdes MD;  Location: [facility] OR [address] [place] [address];  Service: Oncology;  Laterality: Left;   left iliac crest    BONE MARROW BIOPSY Left 2021    Procedure: Biopsy-bone marrow;  Surgeon: Gita Valdes MD;  Location: [facility] ENDO (4TH FLR);  Service: Oncology;  Laterality: Left;    BUNIONECTOMY      CAROTID ENDARTERECTOMY Left     CAROTID STENT      COLONOSCOPY N/A 2016    Procedure: COLONOSCOPY;  Surgeon: PATRICIA Simpson MD;  Location: [facility] ENDO (4TH FLR);  Service: Endoscopy;  Laterality: N/A;  pt has vomiting with anesthesia in past    ENDOMETRIAL ABLATION      for endometriosis    INSERTION OF TUNNELED CENTRAL VENOUS CATHETER (CVC) WITH SUBCUTANEOUS PORT N/A 2020    Procedure: [id number];  Surgeon: Dosc Diagnostic Provider;  Location: [facility] OR 2ND FLR;  Service: Radiology;  Laterality: N/A;  189    TONSILLECTOMY      TYMPANOSTOMY TUBE PLACEMENT       Family History   Problem Relation Age of Onset    Heart disease Mother     Hyperlipidemia Mother     Heart disease Father     Alcohol abuse Father     Cancer Paternal Grandmother         smoker; lung?    Alcohol abuse Brother         recovering    Colon cancer Neg Hx     Breast cancer Neg Hx      Social History     Tobacco Use    Smoking status: Former Smoker     Packs/day: 1.50     Years: 50.00     Pack years: 75.00     Types: Cigarettes, Vaping with nicotine     Quit date: 3/3/2017     Years since quittin.2    Smokeless tobacco: Never Used   Substance Use Topics    Alcohol use: No    Drug use: No     Review of Systems   Constitutional: Negative for fever.   HENT: Negative for sore throat.    Respiratory: Negative for shortness of breath.    Cardiovascular: Negative for chest pain.   Gastrointestinal: Negative for nausea and vomiting.   Genitourinary: Negative for dysuria.   Musculoskeletal: Negative for back pain, neck pain and neck stiffness.   Skin: Negative for rash.   Neurological: Negative for weakness.   Hematological: Does not bruise/bleed easily.       Physical Exam     Initial Vitals [22  0933]   BP Pulse Resp Temp SpO2   105/61 81 18 99 °F (37.2 °C) (!) 94 %      MAP       --         Physical Exam    Nursing note and vitals reviewed.  Constitutional: She appears well-developed and well-nourished.   HENT:   Head: Normocephalic and atraumatic.   Eyes: EOM are normal. Pupils are equal, round, and reactive to light.   Neck: Neck supple. No JVD present.   Normal range of motion.  Cardiovascular: Normal rate, regular rhythm, normal heart sounds and intact distal pulses.   Pulmonary/Chest: Breath sounds normal.   Abdominal: Abdomen is soft. Bowel sounds are normal. She exhibits no distension.   Musculoskeletal:         General: No tenderness. Normal range of motion.      Cervical back: Normal range of motion and neck supple.     Lymphadenopathy:     She has no cervical adenopathy.   Neurological: She is alert and oriented to person, place, and time. She has normal strength and normal reflexes. GCS score is 15. GCS eye subscore is 4. GCS verbal subscore is 5. GCS motor subscore is 6.   Skin: Skin is warm and dry. Capillary refill takes less than 2 seconds.         ED Course   Procedures  Labs Reviewed   CBC W/ AUTO DIFFERENTIAL - Abnormal; Notable for the following components:       Result Value    RBC 1.83 (*)     Hemoglobin 5.5 (*)     Hematocrit 18.0 (*)     MCHC 30.6 (*)     RDW 15.8 (*)     Platelets 144 (*)     All other components within normal limits    Narrative:     HGB and HCT critical result(s) called and verbal readback obtained   from Ryan Chong MD by RENEE 06/14/2022 11:29   COMPREHENSIVE METABOLIC PANEL - Abnormal; Notable for the following components:    Glucose 112 (*)     BUN 31 (*)     Total Protein 5.5 (*)     Albumin 3.2 (*)     Alkaline Phosphatase 49 (*)     All other components within normal limits   HEPATITIS C ANTIBODY   TYPE & SCREEN   PREPARE RBC SOFT        ECG Results          EKG 12-lead (Final result)  Result time 06/14/22 11:30:36    Final result by Interface, Lab In [facility]  (06/14/22 11:30:36)                 Narrative:    Test Reason : D64.9,    Vent. Rate : 081 BPM     Atrial Rate : 081 BPM     P-R Int : 154 ms          QRS Dur : 080 ms      QT Int : 384 ms       P-R-T Axes : 067 016 048 degrees     QTc Int : 446 ms    Normal sinus rhythm  Normal ECG  When compared with ECG of 23-APR-2022 16:37,  No significant change was found  Confirmed by TYLOR ALLEN MD ([id number]) on 6/14/2022 11:30:24 AM    Referred By: JUAN ALBERTO   SELF           Confirmed By:TYLOR ALLEN MD                            Imaging Results    None          Medications   0.9%  NaCl infusion (for blood administration) (has no administration in time range)     Medical Decision Making:   Initial Assessment:   Patient 21-year-old female who presents emergency department for anemia.  Patient is alert oriented in no acute distress.  Patient is afebrile with vitals within normal limits.  Physical exam noted as above  Differential Diagnosis:   Anemia chronic disease  Unlikely hemolytic anemia  Doubt GI bleed  Clinical Tests:   Lab Tests: Ordered and Reviewed  Medical Tests: Ordered and Reviewed  ED Management:  Patient presented with anemia.  Patient was sent over due to fatigue and hemoglobin labs obtained.  Patient consented  for blood.  2 units ordered.  Patient remained hemodynamically stable.  Only 1 unit available for patient's specific antibodies.  Patient transfused with 1 unit  Discussed with BMT who will admit patient to service.  Discussed plan with patient is agreeable with plan.          [unfilled]            Clinical Impression:   Final diagnoses:  [D64.9] Anemia          ED Disposition Condition    Admit               Ryan Nogueira MD  Resident  06/14/22 1201

## 2022-06-14 NOTE — ASSESSMENT & PLAN NOTE
Home regimen: none  Last A1c: 5.8% on 5/20/22  - Diabetic diet  - Glucose 112 on admission on prednisone daily  - LDSSI for now. No scheduled

## 2022-06-14 NOTE — ASSESSMENT & PLAN NOTE
Due for cycle 40 of azacitidine on 5/2/22. Delayed due to multiple PNA admissions, needs for SNF.   Pending PT placement recs (HH vs SNF), will plan heme onc f/u on discharge for assessment for resumption of chemo.

## 2022-06-14 NOTE — ED NOTES
"Patient identifiers verified and correct for Susan Puente  C/C: Pt states "they told me my blood was low, but I feel fine"  APPEARANCE: awake and alert in no acute distress.  SKIN: warm, dry and intact. No breakdown or bruising.  MUSCULOSKELETAL: Patient moving all extremities spontaneously, no obvious swelling or deformities noted. Ambulates independently.  RESPIRATORY: Denies shortness of breath. Respirations unlabored.   CARDIAC: Denies CP, 2+ distal pulses; no peripheral edema  ABDOMEN: soft, non-tender, and non-distended, Denies N/V  : voids spontaneously, denies difficulty  Neurologic: AAO x 4; follows commands equal strength in all extremities; denies numbness/tingling. Denies dizziness   "

## 2022-06-14 NOTE — HPI
Susan Puente is a 71yoF who follows with Dr. Valdes for MDS on treatment with azacitidine (currently receiving decitabine due to national azacitidine shortage), DM2, HTN, CAD, gout, anxiety, and depression who presents to the hospital from SNF with Hgb 4.9 on labwork. She has had 2 recent hospitalizations since April, both for pneumonia and related hypoxemic respiratory failure. She is no longer on treatment dose antibiotics, but was discharged from both hospitalizations to SNF for weakness and deconditioning. Her last discharge was on 5/19. She states she has been asymptomatic for anemia, but Hgb 4.9 is lower than her baseline around 6.5-7.5. She denies SOB, VENEGAS, CP, lightheadedness, fatigue, or weakness. No bleeding sources. She was scheduled originally for her 40th cycle of azacitidine on 5/2/22, but it has been delayed by hospitalizations and stays at SNF. Given 1u (second unit ordered but blood bank attempting to get a match) RBCs in ED and admitted to BMT service.

## 2022-06-14 NOTE — NURSING
Called blood bank to check on the status of second unit of blood. Blood bank has ordered but does not know when it will be found and sent. They will call when it arrives

## 2022-06-15 NOTE — PLAN OF CARE
NURSING HOME ORDERS    06/16/2022  [facility]  [facility])  [address]  [facility] [address]  Dept: [phone]  Loc: [phone]     Admit to Nursing Home:      Diagnoses:  Active Hospital Problems    Diagnosis  POA    *MDS (myelodysplastic syndrome) with 5q deletion [D46.C]  Yes     Priority: 1 - High    Anemia requiring transfusions [D64.9]  Yes     Priority: 2     Physical debility [R53.81]  Yes     Priority: 3     Controlled type 2 diabetes mellitus without complication, without long-term current use of insulin [E11.9]  Yes     Priority: 4     Long term (current) use of systemic steroids [Z79.52]  Not Applicable     Priority: 5     Essential hypertension [I10]  Yes    CAD (coronary artery disease) [I25.10]  Yes      Resolved Hospital Problems   No resolved problems to display.       Patient is homebound due to:  MDS (myelodysplastic syndrome) with 5q deletion    Allergies:  Review of patient's allergies indicates:   Allergen Reactions    Revlimid [lenalidomide] Swelling     Facial swelling  6/8/17 - in the process of desensitation       Vitals:  Routine    Diet: diabetic diet: 2000 calorie    Activities:   Activity as tolerated    Goals of Care Treatment Preferences:  Code Status: DNR    Living Will: Yes            Labs:  CBC weekly    Nursing Precautions:  Fall    Consults:   PT to evaluate and treat- 3 times a week, OT to evaluate and treat- 3 times a week and Wound Care                   Diabetes Care:  SN to perform and educate Diabetic management with blood glucose monitoring:, Fingerstick blood sugar AC and HS and Report CBG < 60 or > 350 to physician.      Medications: Discontinue all previous medication orders, if any. See new list below.     Medication List      Start taking these medications    ciprofloxacin HCl 500 MG tablet  Commonly known as: CIPRO  Take 1 tablet (500 mg total) by mouth every 12 (twelve) hours.     folic acid 1 MG  tablet  Commonly known as: FOLVITE  Take 1 tablet (1 mg total) by mouth once daily.  Start taking on: June 17, 2022        Continue taking these medications    acyclovir 400 MG tablet  Commonly known as: ZOVIRAX  Take 1 tablet (400 mg total) by mouth 2 (two) times daily.     allopurinoL 100 MG tablet  Commonly known as: ZYLOPRIM  Take 1 tablet (100 mg total) by mouth once daily.     aspirin 81 MG EC tablet  Commonly known as: ECOTRIN  Take 1 tablet (81 mg total) by mouth once daily.     carvediloL 25 MG tablet  Commonly known as: COREG  Take 1 tablet (25 mg total) by mouth 2 (two) times daily with meals.     chlorthalidone 25 MG Tab  Commonly known as: HYGROTEN  Take 1 tablet (25 mg total) by mouth once daily.     citalopram 20 MG tablet  Commonly known as: CeleXA  Take 1 tablet (20 mg total) by mouth once daily.     dapsone 100 MG Tab  Take 1 tablet (100 mg total) by mouth once daily.     deferasirox 500 MG disintegrating tablet  Commonly known as: EXJADE  Take 3 tablets (1,500 mg total) by mouth before breakfast.     ENSURE ORAL  Take by mouth daily as needed (supplement).     fluconazole 200 MG Tab  Commonly known as: DIFLUCAN  Take 2 tablets (400 mg total) by mouth once daily.     FREESTYLE MARIANA 14 DAY READER Misc  Generic drug: flash glucose scanning reader  Use 4 or more times per day to check blood sugar     FREESTYLE MARIANA 14 DAY SENSOR Kit  Generic drug: flash glucose sensor  Use qid to check blood sugar     gabapentin 100 MG capsule  Commonly known as: NEURONTIN  Take 1 capsule (100 mg total) by mouth 2 (two) times daily.     ketotifen 0.025 % (0.035 %) ophthalmic solution  Commonly known as: ZADITOR  Place 2-3 drops into both eyes daily as needed (Allergies).     lisinopriL 20 MG tablet  Commonly known as: PRINIVIL,ZESTRIL  Take 1 tablet (20 mg total) by mouth once daily.     loperamide 2 mg capsule  Commonly known as: IMODIUM  Take 4 mg by mouth daily as needed for Diarrhea.     loratadine 10 mg  tablet  Commonly known as: CLARITIN  Take 10 mg by mouth daily as needed.     magnesium oxide 400 mg (241.3 mg magnesium) tablet  Commonly known as: MAG-OX  Take 2 tablets (800 mg total) by mouth 2 (two) times daily.     melatonin 5 mg  Commonly known as: MELATIN  Take 10 mg by mouth every evening.     MIRALAX 17 gram Pwpk  Generic drug: polyethylene glycol  Take 17 g by mouth daily as needed (Constipation).     mirtazapine 7.5 MG Tab  Commonly known as: REMERON  Take 1 tablet (7.5 mg total) by mouth every evening.     MULTIVITAMIN ORAL  Take 1 tablet by mouth once daily.     NIFEdipine 60 MG (OSM) 24 hr tablet  Commonly known as: PROCARDIA-XL  Take 1 tablet (60 mg total) by mouth once daily.     ondansetron 8 MG Tbdl  Commonly known as: ZOFRAN-ODT  Dissolve 1 tablet (8 mg total) by mouth every 8 (eight) hours as needed.     pantoprazole 40 MG tablet  Commonly known as: PROTONIX  Take 1 tablet (40 mg total) by mouth once daily.     potassium chloride SA 20 MEQ tablet  Commonly known as: K-DUR,KLOR-CON  Take 1 tablet (20 mEq total) by mouth once daily.     predniSONE 10 MG tablet  Commonly known as: DELTASONE  Take 6 tabs by mouth once daily for 8 days, THEN 5 tabs once daily for 14 days, 4 tabs daily for 14 days, 3 tabs daily for 14 days, 2 tabs daily for 14 days, 1 tab once daily for 14 days, 0.5 tab once daily for 7 days.  Start taking on: May 19, 2022     REPATHA SURECLICK 140 mg/mL Pnij  Generic drug: evolocumab  Inject 140mg (1 pen) into the skin every 2 weeks.     TRUEPLUS LANCETS 30 gauge Misc  Generic drug: lancets  Use to test blood sugar daily     VIDAZA INJ  Inject as directed every 28 days.        Stop taking these medications    acetaminophen 650 MG Tbsr  Commonly known as: TYLENOL     lisinopriL-hydrochlorothiazide 20-12.5 mg per tablet  Commonly known as: PRINZIDE,ZESTORETIC              Immunizations Administered as of 6/16/2022     No immunizations on file.          Not COVID vaccinated    Some  patients may experience side effects after vaccination.  These may include fever, headache, muscle or joint aches.  Most symptoms resolve with 24-48 hours and do not require urgent medical evaluation unless they persist for more than 72 hours or symptoms are concerning for an unrelated medical condition.          _________________________________  Beatriz King NP  06/16/2022

## 2022-06-15 NOTE — ASSESSMENT & PLAN NOTE
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   Admission Hgb: 4.9  Baseline Hgb: 6.5-7.5  2 units of blood ordered in ED, blood bank shortage. Patient received 2 units PRBC. hgb improved to 7.5 this am.  - Daily CBC

## 2022-06-15 NOTE — PT/OT/SLP EVAL
Physical Therapy Co Evaluation and Tx    Patient Name:  Susan Puente   MRN:  [id number]  Admit Date: 6/14/2022  Admitting Diagnosis:  MDS (myelodysplastic syndrome) with 5q deletion  Length of Stay: 1 days  Recent Surgery: * No surgery found *    *Co treatment of 2 skilled therapists indicated in order to maximize function and safety of Pt.  Recommendations:   Discharge Recommendations:  nursing facility, skilled   Discharge Equipment Recommendations: bedside commode, walker, rolling   Barriers to discharge: Decreased caregiver support    Assessment:     Susan Puente is a 71 y.o. female admitted with a medical diagnosis of MDS (myelodysplastic syndrome) with 5q deletion.  Pt presents with significant functional mobility deficits and is functioning below baseline. Pt mainly limited by L foot drop; states that she has prefabricated AFO at [facility]. Good compensatory strategies noted but requires cues throughout to maintain safety. Pt progressing well but requires to return to SNF to continue regaining independence with ADLs and to ensure safety before dc. Pt at a risk for falls and readmission at this time. Pt will continue to benefit from acute PT services in order to maximize safety and (I) with functional mobility.    Problem List: weakness, impaired endurance, impaired self care skills, impaired functional mobilty, gait instability, impaired balance, decreased coordination, decreased lower extremity function, decreased ROM  Rehab Prognosis: Good; patient would benefit from acute skilled PT services to address these deficits and reach maximum level of function.      Plan:   During this hospitalization, patient to be seen 3 x/week to address the above listed problems via gait training, therapeutic activities, therapeutic exercises, neuromuscular re-education  · Plan of Care Expires:  07/13/22    Subjective     Chief Complaint: Abdormal lab (Coming from [facility] skilled for low H&H. 4.9/6.6. Denies dizziness,  lightheadedness, or SOB.)    Patient/Family Comments/goals: to return to SNF  Pain/Comfort:  · Pain Rating 1: 0/10  · Pain Rating Post-Intervention 1: 0/10    Social History:  Residence: 2SH, 3 LIGIA, 19 steps to 2nd floor. Lives alone. Walk in shower with chair. Bedroom and bathroom on 2nd floor, half bathroom on first floor.   Support available: none  Equipment Used: rollator, shower chair  Prior level of function: mod(I) with rollator/RW for ambulation    Objective:     Communicated with RN prior to session.  Patient found sitting edge of bed upon PT entry to room.   Additional staff present: OT    General Precautions: Standard, fall   Orthopedic Precautions:N/A   Braces: AFO (L foot; AFO not present this date. Pt reports it was left at [facility])   Oxygen Device: Room Air    Exams:  · Cognition:   · AxOx4  · Command following: Follows multistep verbal commands    · Postural Exam:  Patient presented with the following abnormalities:    · -       Rounded shoulders  · -       Forward head  · Sensation:    · -       Impaired  (bilateral foot numbness; L>R)    · RLE ROM: WFL  · RLE Strength: WFL  · LLE ROM: PROM WFL; ankle DF AROM limited  · LLE Strength: WFL except 1/5 ankle DF    Functional Mobility:  Bed Mobility:   Limited assessment 2/2 Pt sitting EOB  Scooting: stand by assistance  Transfers:     Sit to Stand:  contact guard assistance with rolling walker  Gait:   · Distance: ~100 ft  · Level of Assistance:  contact guard assistance  · AD used: rolling walker  · Gait Deviations: decreased speed, step length, swing through, left heel strike, forward flexed posture, and downward gaze.   · L sided foot drop with steppage gait pattern on L side  · Comments: PT providing verbal and tactile cues for improved upright posture, gaze direction, AD management, and gait fluidity.   Balance:   · Static Sitting: SBA  · Dynamic Sitting: SBA   · Static Standing: CGA  · Dynamic Standing: CGA    Therapeutic Activities &  Exercises:      Education on importance of using RW and supervision from staff when performing transfers and OOB mobility   Patient educated on the importance of early mobility to prevent functional decline during hospital stay   Patient was instructed to utilize staff assistance for mobility/transfers.   Patient was educated on PT POC and all questions answered within PT scope of practice.   Patient able to verbalize understanding; will follow-up with pt during current admit for additional questions/concerns within scope of practice.     Outcome Measures:  AM-PAC 6 CLICK MOBILITY  Turning over in bed (including adjusting bedclothes, sheets and blankets)?: 4  Sitting down on and standing up from a chair with arms (e.g., wheelchair, bedside commode, etc.): 3  Moving from lying on back to sitting on the side of the bed?: 3  Moving to and from a bed to a chair (including a wheelchair)?: 3  Need to walk in hospital room?: 3  Climbing 3-5 steps with a railing?: 2  Basic Mobility Total Score: 18     Patient left sitting on bedside couch with call button in reach and RN notified.    GOALS:   Multidisciplinary Problems     Physical Therapy Goals        Problem: Physical Therapy    Goal Priority Disciplines Outcome Goal Variances Interventions   Physical Therapy Goal     PT, PT/OT Ongoing, Progressing     Description: Goals to be met by: 22     Patient will increase functional independence with mobility by performin. Supine to sit with Gonzales  2. Sit to supine with Gonzales  3. Sit to stand transfer with Modified Gonzales  4. Gait  x >150 feet with Stand-by Assistance using Rolling Walker.   5. Lower extremity exercise program x 20 reps per handout, with independence                       History:     Past Medical History:   Diagnosis Date    Allergic rhinitis     Allergy     Anemia     Anxiety     CAD (coronary artery disease)     Carotid stenosis     Colon polyps 2016    Controlled  type 2 diabetes mellitus without complication, without long-term current use of insulin 10/19/2016    Depression     GERD (gastroesophageal reflux disease)     Hearing loss in right ear     Hx of psychiatric care     Celexa, Prozac    Hypokalemia 2/3/2020    MDS (myelodysplastic syndrome) with 5q deletion     Psychiatric problem     Therapy        Past Surgical History:   Procedure Laterality Date    BONE MARROW BIOPSY Left 6/7/2018    Procedure: Biopsy-bone marrow;  Surgeon: Gita Valdes MD;  [facility];  Service: Oncology;  Laterality: Left;    BONE MARROW BIOPSY Left 3/21/2019    Procedure: Biopsy-bone marrow;  Surgeon: Gita Valdes MD;  [facility];  Service: Oncology;  Laterality: Left;    BONE MARROW BIOPSY Left 10/24/2019    Procedure: Biopsy-bone marrow;  Surgeon: Gita Valdes MD;  [facility];  Service: Oncology;  Laterality: Left;  left iliac crest    BONE MARROW BIOPSY Left 4/21/2021    Procedure: Biopsy-bone marrow;  Surgeon: Gita Valdes MD;  Location: [facility] ENDO (4TH FLR);  Service: Oncology;  Laterality: Left;    BUNIONECTOMY      CAROTID ENDARTERECTOMY Left 2011    CAROTID STENT      COLONOSCOPY N/A 12/20/2016    Procedure: COLONOSCOPY;  Surgeon: PATRICIA Simpson MD;  Location: [facility] ENDO (4TH FLR);  Service: Endoscopy;  Laterality: N/A;  pt has vomiting with anesthesia in past    ENDOMETRIAL ABLATION      for endometriosis    INSERTION OF TUNNELED CENTRAL VENOUS CATHETER (CVC) WITH SUBCUTANEOUS PORT N/A 2/19/2020    Procedure: [id number];  Surgeon: Dosc Diagnostic Provider;  [facility];  Service: Radiology;  Laterality: N/A;  189    TONSILLECTOMY      TYMPANOSTOMY TUBE PLACEMENT         Family History   Problem Relation Age of Onset    Heart disease Mother     Hyperlipidemia Mother     Heart disease Father     Alcohol abuse Father     Cancer Paternal Grandmother         smoker; lung?    Alcohol abuse Brother          recovering    Colon cancer Neg Hx     Breast cancer Neg Hx        Social History     Socioeconomic History    Marital status: Single   Tobacco Use    Smoking status: Former Smoker     Packs/day: 1.50     Years: 50.00     Pack years: 75.00     Types: Cigarettes, Vaping with nicotine     Quit date: 3/3/2017     Years since quittin.2    Smokeless tobacco: Never Used   Substance and Sexual Activity    Alcohol use: No    Drug use: No   Other Topics Concern    Patient feels they ought to cut down on drinking/drug use No    Patient annoyed by others criticizing their drinking/drug use No    Patient has felt bad or guilty about drinking/drug use No    Patient has had a drink/used drugs as an eye opener in the AM No   Social History Narrative    Single, never , no children, former , Shinto     Social Determinants of Health     Financial Resource Strain: Low Risk     Difficulty of Paying Living Expenses: Not hard at all   Food Insecurity: No Food Insecurity    Worried About Running Out of Food in the Last Year: Never true    Ran Out of Food in the Last Year: Never true   Transportation Needs: No Transportation Needs    Lack of Transportation (Medical): No    Lack of Transportation (Non-Medical): No   Physical Activity: Inactive    Days of Exercise per Week: 0 days    Minutes of Exercise per Session: 0 min   Stress: No Stress Concern Present    Feeling of Stress : Only a little   Social Connections: Moderately Isolated    Frequency of Communication with Friends and Family: More than three times a week    Frequency of Social Gatherings with Friends and Family: More than three times a week    Attends Moravian Services: More than 4 times per year    Active Member of Clubs or Organizations: No    Attends Club or Organization Meetings: Never    Marital Status: Never    Housing Stability: Low Risk     Unable to Pay for Housing in the Last Year: No    Number of  Places Lived in the Last Year: 1    Unstable Housing in the Last Year: No     Time Tracking:     PT Received On: 06/15/22  PT Start Time: 0908     PT Stop Time: 0944  PT Total Time (min): 36 min     Billable Minutes: Evaluation 10, Gait Training 10 and Therapeutic Activity 15    6/15/2022

## 2022-06-15 NOTE — PT/OT/SLP EVAL
Occupational Therapy   Evaluation    Name: Susan Puente  MRN: [id number]  Admitting Diagnosis:  Anemia requiring transfusions  Recent Surgery: * No surgery found *      Recommendations:     Discharge Recommendations: nursing facility, skilled  Discharge Equipment Recommendations:  bedside commode, walker, rolling  Barriers to discharge:  Decreased caregiver support, Inaccessible home environment, Other (Comment) (decreased independence with ADLs)    Assessment:     Susan Puente is a 71 y.o. female with a medical diagnosis of Anemia requiring transfusions.  She presents with weakness, impaired endurance, impaired self care skills, impaired functional mobilty, gait instability, impaired balance, decreased coordination, decreased lower extremity function, decreased safety awareness, impaired coordination.      Pt motivated to participate with OT this date. Pt limited by L foot drop for functional ambulation. Pt has AFO but reports it was left at SNF. Pt compensates appropriately but requires cues for safety during functional ambulation. Pt required CGA with RW for OOB functional ambulation and ADLs. Pt progressing well but requires to return to SNF to continue regaining independence with ADLs and to ensure safety before dc. Pt at a risk for falls and readmission at this time. Current needs can only be met at a skilled setting upon dc.     Rehab Prognosis: Good; patient would benefit from acute skilled OT services to address these deficits and reach maximum level of function.       Plan:     Patient to be seen 3 x/week to address the above listed problems via self-care/home management, therapeutic activities, therapeutic exercises  · Plan of Care Expires: 07/15/22  · Plan of Care Reviewed with: patient    Subjective     Chief Complaint: L foot drop   Patient/Family Comments/goals: to return to PLOF    Occupational Profile:  Living Environment: 2SH, 3 LIGIA, 19 steps to 2nd floor. Lives alone. Walk in shower  with chair. Bedroom and bathroom on 2nd floor, half bathroom on first floor.   Previous level of function: Mod I with rollator for functional mobility and independent with ADLs   Roles and Routines: active and driving   Equipment Used at Home:  rollator, shower chair  Assistance upon Discharge: none    Pain/Comfort:  · Pain Rating 1: 0/10    Patients cultural, spiritual, Buddhist conflicts given the current situation: no    Objective:     Communicated with: RN prior to session.  Patient found sitting edge of bed with  (no active lines) upon OT entry to room.    General Precautions: Standard, fall   Orthopedic Precautions:N/A   Braces: AFO (L foot; AFO not present this date. Pt reports it was left at [facility].)  Respiratory Status: Room air    Occupational Performance:    Bed Mobility:    · Not assessed, pt received sitting EOB and left sitting on sofa     Functional Mobility/Transfers:  · Sit <> stand: CGA with RW. No cues required for hand placement   · Functional Mobility: Pt required CGA and RW for functional hallway ambulation with PT and room ambulation to bathroom for grooming activities. Pt required cues for management of L foot 2/2 L foot drop, no LOB noted during session.     Activities of Daily Living:  · Grooming: CGA in standing at sink for oral care, hand hygiene and face washing   · LBD: Supervision to doff and don socks while seated EOB    Cognitive/Visual Perceptual:  Cognitive/Psychosocial Skills:     -       Oriented to: not formally assessed, no deficits noted   -       Follows Commands/attention:follows multistep commands  -       Communication: clear/fluent  -       Memory: No Deficits noted  -       Safety awareness/insight to disability: intact   -       Mood/Affect/Coping skills/emotional control: Happy  Visual/Perceptual:      -Intact  -Pt reports she wears reading glasses .  Pt reports hearing loss in her R ear, chronic. Hearing at baseline.     Physical Exam:  BUE ROM WNL  BUE MMT  grossly 4+/5, except B shoulder flexion 4/5.  Pt denies numbness/tingling to BUEs. Pt endorses B foot numbness.      AMPAC 6 Click ADL:  AMPAC Total Score: 19    Treatment & Education:  OT role, plan of care, progression of goals, importance of continued OOB activity, ADL/functional mobility/endurance retraining, fall prevention, safety precautions, discharge planning and recommendation  Education:    Patient left up in chair with all lines intact, call button in reach and RN notified    GOALS:   Multidisciplinary Problems     Occupational Therapy Goals        Problem: Occupational Therapy    Goal Priority Disciplines Outcome Interventions   Occupational Therapy Goal     OT, PT/OT Ongoing, Progressing    Description: Goals to be met by: 6/29/22    Patient will increase functional independence with ADLs by performing:    UE Dressing with Leland.  LE Dressing with Leland.  Grooming while standing at sink with Leland.  Toileting from toilet with Leland for hygiene and clothing management.   Toilet transfer to toilet with Modified Leland.                     History:     Past Medical History:   Diagnosis Date    Allergic rhinitis     Allergy     Anemia     Anxiety     CAD (coronary artery disease)     Carotid stenosis     Colon polyps 2016    Controlled type 2 diabetes mellitus without complication, without long-term current use of insulin 10/19/2016    Depression     GERD (gastroesophageal reflux disease)     Hearing loss in right ear     Hx of psychiatric care     Celexa, Prozac    Hypokalemia 2/3/2020    MDS (myelodysplastic syndrome) with 5q deletion     Psychiatric problem     Therapy        Past Surgical History:   Procedure Laterality Date    BONE MARROW BIOPSY Left 6/7/2018    Procedure: Biopsy-bone marrow;  Surgeon: Gita Valdes MD;  Location: [facility] OR [address] [place] [address];  Service: Oncology;  Laterality: Left;    BONE MARROW BIOPSY Left 3/21/2019    Procedure: Biopsy-bone  marrow;  Surgeon: Gita Valdes MD;  [facility];  Service: Oncology;  Laterality: Left;    BONE MARROW BIOPSY Left 10/24/2019    Procedure: Biopsy-bone marrow;  Surgeon: Gita Valdes MD;  Location: [facility] OR [facility] FLR;  Service: Oncology;  Laterality: Left;  left iliac crest    BONE MARROW BIOPSY Left 4/21/2021    Procedure: Biopsy-bone marrow;  Surgeon: Gita Valdes MD;  Location: [facility] ENDO (4TH FLR);  Service: Oncology;  Laterality: Left;    BUNIONECTOMY      CAROTID ENDARTERECTOMY Left 2011    CAROTID STENT      COLONOSCOPY N/A 12/20/2016    Procedure: COLONOSCOPY;  Surgeon: PATRICIA Simpson MD;  Location: [facility] ENDO (4TH FLR);  Service: Endoscopy;  Laterality: N/A;  pt has vomiting with anesthesia in past    ENDOMETRIAL ABLATION      for endometriosis    INSERTION OF TUNNELED CENTRAL VENOUS CATHETER (CVC) WITH SUBCUTANEOUS PORT N/A 2/19/2020    Procedure: [id number];  Surgeon: Dosserena Diagnostic Provider;  [address]  Service: Radiology;  Laterality: N/A;  189    TONSILLECTOMY      TYMPANOSTOMY TUBE PLACEMENT         Time Tracking:     OT Date of Treatment: 06/15/22  OT Start Time: 0908  OT Stop Time: 0944  OT Total Time (min): 36 min    Billable Minutes:Evaluation 13 minutes  Self Care/Home Management 23 minutes     Co evaluation performed due to patient's multiple deficits requiring two skilled therapists to safety assess patient's ability to complete ADLs and functional mobility in addition to accommodating for patient's activity tolerance while in acute care setting.      6/15/2022

## 2022-06-15 NOTE — DISCHARGE SUMMARY
"[facility]  Discharge Summary      Patient Name: Susan Puente  MRN: [id number]  Patient Class: IP- SNF  Admission Date: 5/19/2022  Hospital Length of Stay: 26 days  Discharge Date and Time:  06/15/2022 6:30 PM  Attending Physician: Fiona att. providers found   Discharging Provider: Fidelina Maxwell NP  Primary Care Provider: Claudia Rapp MD      HPI:    Ms. Puente is a 71- year old patient with PMHx of MDS on treatment with Vidaza (currently receiving decitabine due to national Vidaza shortage; Dr. Valdes pt),  DM2, HTN, CAD, anxiety, and depression  Who presents to SNF following hospitalization for acute respiratory failure with hypoxia.     Per  Chart review, "She was admitted 4/23/22 to 4/27/22 with CAP. She was discharged on LVQ, which she will complete on 4/30. She was discharged 4/27/22 with home health and home oxygen. She presented to the ED on 4/29 with complaint of bilat foot pain making it difficult to ambulate at home. Redness noted to bilat great toes. X-ray showing soft tissue edema suggestive of gout to right foot and bunion to left foot. She has a history of gout per patient. She will be admitted to BMT service under observation status. PT/OT will be consulted for eval of SNF appropriateness. She reports that her breathing is significantly improved. Ms. Puente was admitted inpatient on April 29th, shortly after being discharged from the hospital, with an acute gout flare and worsening respiratory status and fever, concerning for pneumonia and acute hypoxic respiratory failure. She was initiated on colchicine and allopurinol with improvement in gout flare. Unfortunately her prolonged hospital course was complicated by worsening respiratory status, requiring supplemental O2 and ongoing IV antibiotics. Bcx remained negative, but imaging revealed worsening RML consolidation. Ultimately, pulmonary was consulted and the patient underwent on 5/11 that was " "non-revealing of infectious source/malignancy, but did revealed inflammation. The patient was started on a steroid taper with some moderate improvement in her respiratory status and ability to participate with therapy teams. Additional complications this hospitalization included poor PO intake/appetite, which have also improved with steroids and the addition of remeron; and resistant hypertension, which required changes to her anti-HTN regimen and an addition of nifidepine XL. Throughout stay, given her physical deconditioning, PT/OT continued to recommend SNF post-discharge. She was medically ready for discharge for several days prior to SNF bed availability and ultimately d/c'd to SNF on 5/19 in improved condition."     Today, patient reports chronic pain to her bilateral shoulders.  Patient is currently utilizing supplemental O2 at 2 L nasal cannula and states that her respiratory status has greatly improved.  Patient is still experiencing mild gout symptoms to her right foot.     Patient will be treated at [facility] with PT and OT to improve functional status and ability to perform ADLs.     Hospital Course:   Patient prematurely discharge from SNF.  Patient sent to ED for evaluation of critically low H&H.       Goals of Care Treatment Preferences:  Code Status: DNR    Living Will: Yes              Consults:   Consults (From admission, onward)        Status Ordering Provider     Inpatient consult to Registered Dietitian/Nutritionist  Once        Provider:  (Not yet assigned)    Completed YEIMY MCKEON          No new Assessment & Plan notes have been filed under this hospital service since the last note was generated.  Service: Hospital Medicine    Final Active Diagnoses:    Diagnosis Date Noted POA    PRINCIPAL PROBLEM:  Acute respiratory failure with hypoxia [J96.01] 04/26/2022 Yes    Physical debility [R53.81] 04/29/2022 Yes    Acute gout of foot [M10.9] 04/29/2022 Yes    Pancytopenia due to " antineoplastic chemotherapy [D61.810, T45.1X5A] 04/26/2022 Yes    Moderate malnutrition [E44.0] 04/24/2022 Yes    Controlled type 2 diabetes mellitus without complication, without long-term current use of insulin [E11.9] 07/17/2021 Yes    Insomnia [G47.00] 01/06/2020 Yes    MDS (myelodysplastic syndrome) [D46.9] 06/07/2018 Yes    Adjustment disorder with mixed anxiety and depressed mood [F43.23] 03/05/2018 Yes    MDS (myelodysplastic syndrome) with 5q deletion [D46.C] 06/08/2017 Yes    Essential hypertension [I10] 12/01/2016 Yes    CAD (coronary artery disease) [I25.10]  Yes      Problems Resolved During this Admission:       Discharged Condition: good    Disposition: Short Term Hospital    Follow Up:    Patient Instructions:   No discharge procedures on file.    Significant Diagnostic Studies: Labs:   BMP:   Recent Labs   Lab 06/14/22  0440 06/14/22  1018   * 112*    141   K 4.5 4.2    106   CO2 28 26   BUN 35* 31*   CREATININE 1.0 0.8   CALCIUM 8.5* 9.0   MG 1.8  --     and CBC   Recent Labs   Lab 06/14/22  0440 06/14/22  1018 06/15/22  0633   WBC 5.57 8.18 6.82   HGB 4.9* 5.5* 7.5*   HCT 16.6* 18.0* 24.1*   * 144* 129*       Pending Diagnostic Studies:     None         Medications:  Reconciled Home Medications:      Medication List      START taking these medications    gabapentin 100 MG capsule  Commonly known as: NEURONTIN  Take 1 capsule (100 mg total) by mouth 2 (two) times daily.        CHANGE how you take these medications    carvediloL 25 MG tablet  Commonly known as: COREG  Take 1 tablet (25 mg total) by mouth 2 (two) times daily with meals.  What changed: Another medication with the same name was removed. Continue taking this medication, and follow the directions you see here.     lisinopriL 20 MG tablet  Commonly known as: PRINIVIL,ZESTRIL  Take 1 tablet (20 mg total) by mouth once daily.  What changed:   · medication strength  · how much to take     magnesium oxide 400  mg (241.3 mg magnesium) tablet  Commonly known as: MAG-OX  Take 2 tablets (800 mg total) by mouth 2 (two) times daily.  What changed: Another medication with the same name was removed. Continue taking this medication, and follow the directions you see here.        CONTINUE taking these medications    acyclovir 400 MG tablet  Commonly known as: ZOVIRAX  Take 1 tablet (400 mg total) by mouth 2 (two) times daily.     allopurinoL 100 MG tablet  Commonly known as: ZYLOPRIM  Take 1 tablet (100 mg total) by mouth once daily.     aspirin 81 MG EC tablet  Commonly known as: ECOTRIN  Take 1 tablet (81 mg total) by mouth once daily.     chlorthalidone 25 MG Tab  Commonly known as: HYGROTEN  Take 1 tablet (25 mg total) by mouth once daily.     citalopram 20 MG tablet  Commonly known as: CeleXA  Take 1 tablet (20 mg total) by mouth once daily.     dapsone 100 MG Tab  Take 1 tablet (100 mg total) by mouth once daily.     deferasirox 500 MG disintegrating tablet  Commonly known as: EXJADE  Take 3 tablets (1,500 mg total) by mouth before breakfast.     ENSURE ORAL  Take by mouth daily as needed (supplement).     fluconazole 200 MG Tab  Commonly known as: DIFLUCAN  Take 2 tablets (400 mg total) by mouth once daily.     ketotifen 0.025 % (0.035 %) ophthalmic solution  Commonly known as: ZADITOR  Place 2-3 drops into both eyes daily as needed (Allergies).     loperamide 2 mg capsule  Commonly known as: IMODIUM  Take 4 mg by mouth daily as needed for Diarrhea.     loratadine 10 mg tablet  Commonly known as: CLARITIN  Take 10 mg by mouth daily as needed.     melatonin 5 mg  Commonly known as: MELATIN  Take 10 mg by mouth every evening.     MIRALAX 17 gram Pwpk  Generic drug: polyethylene glycol  Take 17 g by mouth daily as needed (Constipation).     mirtazapine 7.5 MG Tab  Commonly known as: REMERON  Take 1 tablet (7.5 mg total) by mouth every evening.     MULTIVITAMIN ORAL  Take 1 tablet by mouth once daily.     NIFEdipine 60 MG (OSM)  24 hr tablet  Commonly known as: PROCARDIA-XL  Take 1 tablet (60 mg total) by mouth once daily.     ondansetron 8 MG Tbdl  Commonly known as: ZOFRAN-ODT  Dissolve 1 tablet (8 mg total) by mouth every 8 (eight) hours as needed.     pantoprazole 40 MG tablet  Commonly known as: PROTONIX  Take 1 tablet (40 mg total) by mouth once daily.     potassium chloride SA 20 MEQ tablet  Commonly known as: K-DUR,KLOR-CON  Take 1 tablet (20 mEq total) by mouth once daily.     predniSONE 10 MG tablet  Commonly known as: DELTASONE  Take 6 tabs by mouth once daily for 8 days, THEN 5 tabs once daily for 14 days, 4 tabs daily for 14 days, 3 tabs daily for 14 days, 2 tabs daily for 14 days, 1 tab once daily for 14 days, 0.5 tab once daily for 7 days.  Start taking on: May 19, 2022     REPATHA SURECLICK 140 mg/mL Pnij  Generic drug: evolocumab  Inject 140mg (1 pen) into the skin every 2 weeks.     TRUEPLUS LANCETS 30 gauge Misc  Generic drug: lancets  Use to test blood sugar daily     VIDAZA INJ  Inject as directed every 28 days.        STOP taking these medications    acetaminophen 650 MG Tbsr  Commonly known as: TYLENOL     ciprofloxacin HCl 500 MG tablet  Commonly known as: CIPRO     levoFLOXacin 750 MG tablet  Commonly known as: LEVAQUIN     lisinopriL-hydrochlorothiazide 20-12.5 mg per tablet  Commonly known as: PRINZIDE,ZESTORETIC        ASK your doctor about these medications    FREESTYLE MARIANA 14 DAY READER Misc  Generic drug: flash glucose scanning reader  Use 4 or more times per day to check blood sugar     FREESTYLE MARIANA 14 DAY SENSOR Kit  Generic drug: flash glucose sensor  Use qid to check blood sugar            Indwelling Lines/Drains at time of discharge:   Lines/Drains/Airways     Central Venous Catheter Line  Duration           Port A Cath Single Lumen 05/31/22 1144 right subclavian 15 days          Drain  Duration           Female External Urinary Catheter 04/30/22 1740 46 days                Time spent on the  discharge of patient: 0 minutes         Fidelina Maxwell NP  Department of [facility]  [facility] - [facility]

## 2022-06-15 NOTE — SUBJECTIVE & OBJECTIVE
Subjective:     Interval History: Admitted yesterday evening for anemia, hgb 4.9 at SNF. Transfused 2 units PRBC overnight and hgb improved to 7.5 today. VSS and no other active issues. Patient was transferred from SNF, already had plans to discharge to nursing home. CXR, PPD and Covid testing already completed. Unable to get paperwork completed in time for transfer to today. Will discharge to nursing home in am.     Objective:     Vital Signs (Most Recent):  Temp: 97.8 °F (36.6 °C) (06/15/22 1531)  Pulse: 67 (06/15/22 1533)  Resp: 17 (06/15/22 1531)  BP: (Abnormal) 111/54 (06/15/22 1533)  SpO2: (Abnormal) 93 % (06/15/22 1531)   Vital Signs (24h Range):  Temp:  [96.3 °F (35.7 °C)-98.4 °F (36.9 °C)] 97.8 °F (36.6 °C)  Pulse:  [63-75] 67  Resp:  [16-18] 17  SpO2:  [93 %-97 %] 93 %  BP: ()/(45-71) 111/54     Weight: 69.7 kg (153 lb 10.6 oz)  Body mass index is 25.57 kg/m².  Body surface area is 1.79 meters squared.    ECOG SCORE             Intake/Output - Last 3 Shifts       Intake/Output Category 06/13 0700 to 06/14 0659 06/14 0700 to 06/15 0659 06/15 0700 to 06/16 0659    Blood  819.2     Total Intake(mL/kg)  819.2 (11.8)     Urine (mL/kg/hr)  340     Total Output  340     Net  +479.2            Urine Occurrence  3 x             Physical Exam  Constitutional:       General: She is not in acute distress.     Appearance: Normal appearance.   HENT:      Head: Normocephalic and atraumatic.      Mouth/Throat:      Mouth: Mucous membranes are moist.      Pharynx: Oropharynx is clear.   Eyes:      Conjunctiva/sclera: Conjunctivae normal.      Pupils: Pupils are equal, round, and reactive to light.   Cardiovascular:      Rate and Rhythm: Normal rate and regular rhythm.      Pulses: Normal pulses.      Heart sounds: Normal heart sounds.   Pulmonary:      Effort: Pulmonary effort is normal.      Breath sounds: Normal breath sounds.   Abdominal:      General: Abdomen is flat.      Palpations: Abdomen is soft.    Musculoskeletal:         General: No swelling or deformity.      Cervical back: Normal range of motion and neck supple. No tenderness.   Skin:     General: Skin is warm and dry.   Neurological:      General: No focal deficit present.      Mental Status: She is alert and oriented to person, place, and time.   Psychiatric:         Mood and Affect: Mood normal.         Behavior: Behavior normal.       Significant Labs:   CBC:   Recent Labs   Lab 06/14/22  0440 06/14/22  1018 06/15/22  0633   WBC 5.57 8.18 6.82   HGB 4.9* 5.5* 7.5*   HCT 16.6* 18.0* 24.1*   * 144* 129*    and CMP:   Recent Labs   Lab 06/14/22  0440 06/14/22  1018    141   K 4.5 4.2    106   CO2 28 26   * 112*   BUN 35* 31*   CREATININE 1.0 0.8   CALCIUM 8.5* 9.0   PROT 5.0* 5.5*   ALBUMIN 2.9* 3.2*   BILITOT 0.4 0.7   ALKPHOS 44* 49*   AST 17 18   ALT 37 42   ANIONGAP 8 9   EGFRNONAA 56.8* >60.0       Diagnostic Results:  I have reviewed all pertinent imaging results/findings within the past 24 hours.

## 2022-06-15 NOTE — PLAN OF CARE
Problem: Occupational Therapy  Goal: Occupational Therapy Goal  Description: Goals to be met by: 6/29/22    Patient will increase functional independence with ADLs by performing:    UE Dressing with [place].  LE Dressing with [place].  Grooming while standing at sink with [place].  Toileting from toilet with [place] for hygiene and clothing management.   Toilet transfer to toilet with Modified [place].    Outcome: Ongoing, Progressing     OT eval complete. Pt presents from SNF, dc rec back to SNF when medically ready. DME needs: BSC, RW. Pt with L foot drop, has AFO but states it is at SNF. Pt requires CGA for OOB ADLs and functional mobility. Fall risk but aware of safety precautions.

## 2022-06-15 NOTE — ASSESSMENT & PLAN NOTE
PT/OT eval for placement  SNF PT recommended home with  PT/OT,   PT/OT reassment this am recommending SNF  Patient already has been accepted to nursing home, discharge in am

## 2022-06-15 NOTE — ASSESSMENT & PLAN NOTE
Patient with hypoxemic respiratory failure thought to be inflammatory on prior admission after non-infectious bronchoscopy. Pulm sent her home with 60mg PO qd for 2 weeks and a taper by 10mg daily every 2 weeks.   - Continue Prednisone 40mg PO qd for now    Prednisone Schedule (based on pulm recs last admission):  5/12-5/26: 60mg qd  5/27-6/9: 50mg qd  6/10-6/23: 40mg qd  6/24-7/7: 30mg qd  7/8-7/21: 20mg qd  7/22-8/4: 10mg qd  8/5-8/11: 5mg qd

## 2022-06-15 NOTE — ASSESSMENT & PLAN NOTE
Due for cycle 40 of azacitidine on 5/2/22. Delayed due to multiple PNA admissions, needs for SNF.   Nursing home will transplant patient for future treatment, scheduled Vidaza to restart on 7/11

## 2022-06-15 NOTE — HOSPITAL COURSE
06/15/2022 Admitted yesterday evening for anemia, hgb 4.9 at SNF. Transfused 2 units PRBC overnight and hgb improved to 7.5 today. VSS and no other active issues. Patient was transferred from SNF, already had plans to discharge to nursing home. CXR, PPD and Covid testing already completed. Unable to get paperwork completed in time for transfer to today. Will discharge to nursing home in am.   06/16/2022 VSS, NEON. Hgb 7.3 this am, will transfuse 1 unit PRBC prior to discharge to [facility] today after transfusion. Added folic acid daily, folic acid level from 2019 was low normal.

## 2022-06-15 NOTE — PROGRESS NOTES
[facility] ([facility])  Hematology  Bone Marrow Transplant  Progress Note    Patient Name: Susan Puente  Admission Date: 6/14/2022  Hospital Length of Stay: 1 days  Code Status: DNR    Subjective:     Interval History: Admitted yesterday evening for anemia, hgb 4.9 at SNF. Transfused 2 units PRBC overnight and hgb improved to 7.5 today. VSS and no other active issues. Patient was transferred from SNF, already had plans to discharge to nursing home. CXR, PPD and Covid testing already completed. Unable to get paperwork completed in time for transfer to today. Will discharge to nursing home in am.     Objective:     Vital Signs (Most Recent):  Temp: 97.8 °F (36.6 °C) (06/15/22 1531)  Pulse: 67 (06/15/22 1533)  Resp: 17 (06/15/22 1531)  BP: (Abnormal) 111/54 (06/15/22 1533)  SpO2: (Abnormal) 93 % (06/15/22 1531)   Vital Signs (24h Range):  Temp:  [96.3 °F (35.7 °C)-98.4 °F (36.9 °C)] 97.8 °F (36.6 °C)  Pulse:  [63-75] 67  Resp:  [16-18] 17  SpO2:  [93 %-97 %] 93 %  BP: ()/(45-71) 111/54     Weight: 69.7 kg (153 lb 10.6 oz)  Body mass index is 25.57 kg/m².  Body surface area is 1.79 meters squared.    ECOG SCORE             Intake/Output - Last 3 Shifts       Intake/Output Category 06/13 0700 to 06/14 0659 06/14 0700 to 06/15 0659 06/15 0700 to 06/16 0659    Blood  819.2     Total Intake(mL/kg)  819.2 (11.8)     Urine (mL/kg/hr)  340     Total Output  340     Net  +479.2            Urine Occurrence  3 x             Physical Exam  Constitutional:       General: She is not in acute distress.     Appearance: Normal appearance.   HENT:      Head: Normocephalic and atraumatic.      Mouth/Throat:      Mouth: Mucous membranes are moist.      Pharynx: Oropharynx is clear.   Eyes:      Conjunctiva/sclera: Conjunctivae normal.      Pupils: Pupils are equal, round, and reactive to light.   Cardiovascular:      Rate and Rhythm: Normal rate and regular rhythm.      Pulses: Normal pulses.      Heart sounds:  Normal heart sounds.   Pulmonary:      Effort: Pulmonary effort is normal.      Breath sounds: Normal breath sounds.   Abdominal:      General: Abdomen is flat.      Palpations: Abdomen is soft.   Musculoskeletal:         General: No swelling or deformity.      Cervical back: Normal range of motion and neck supple. No tenderness.   Skin:     General: Skin is warm and dry.   Neurological:      General: No focal deficit present.      Mental Status: She is alert and oriented to person, place, and time.   Psychiatric:         Mood and Affect: Mood normal.         Behavior: Behavior normal.       Significant Labs:   CBC:   Recent Labs   Lab 06/14/22  0440 06/14/22  1018 06/15/22  0633   WBC 5.57 8.18 6.82   HGB 4.9* 5.5* 7.5*   HCT 16.6* 18.0* 24.1*   * 144* 129*    and CMP:   Recent Labs   Lab 06/14/22  0440 06/14/22  1018    141   K 4.5 4.2    106   CO2 28 26   * 112*   BUN 35* 31*   CREATININE 1.0 0.8   CALCIUM 8.5* 9.0   PROT 5.0* 5.5*   ALBUMIN 2.9* 3.2*   BILITOT 0.4 0.7   ALKPHOS 44* 49*   AST 17 18   ALT 37 42   ANIONGAP 8 9   EGFRNONAA 56.8* >60.0       Diagnostic Results:  I have reviewed all pertinent imaging results/findings within the past 24 hours.    Assessment/Plan:     * MDS (myelodysplastic syndrome) with 5q deletion  Due for cycle 40 of azacitidine on 5/2/22. Delayed due to multiple PNA admissions, needs for SNF.   Nursing home will transplant patient for future treatment, scheduled Vidaza to restart on 7/11    Anemia requiring transfusions  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   Admission Hgb: 4.9  Baseline Hgb: 6.5-7.5  2 units of blood ordered in ED, blood bank shortage. Patient received 2 units PRBC. hgb improved to 7.5 this am.  - Daily CBC      Physical debility  PT/OT eval for placement  SNF PT recommended home with  HH PT/OT,   PT/OT reassment this am recommending SNF  Patient already has been accepted to nursing home, discharge in am    Controlled type 2 diabetes mellitus without complication, without long-term current use of insulin  Home regimen: none  Last A1c: 5.8% on 5/20/22  - Diabetic diet  - Glucose 112 on admission on prednisone daily  - LDSSI for now. No scheduled    Long term (current) use of systemic steroids  Patient with hypoxemic respiratory failure thought to be inflammatory on prior admission after non-infectious bronchoscopy. Pulm sent her home with 60mg PO qd for 2 weeks and a taper by 10mg daily every 2 weeks.   - Continue Prednisone 40mg PO qd for now    Prednisone Schedule (based on pulm recs last admission):  5/12-5/26: 60mg qd  5/27-6/9: 50mg qd  6/10-6/23: 40mg qd  6/24-7/7: 30mg qd  7/8-7/21: 20mg qd  7/22-8/4: 10mg qd  8/5-8/11: 5mg qd    Essential hypertension  Continue home coreg 25mg BID, chlorthalidone 25mg qd, lisinopril 20mg qd, nifedipine 60mg qd    CAD (coronary artery disease)  Continue ASA, BB  No chest pain        VTE Risk Mitigation (From admission, onward)         Ordered     enoxaparin injection 40 mg  Daily         06/14/22 1208     IP VTE HIGH RISK PATIENT  Once         06/14/22 1208     Place sequential compression device  Until discontinued         06/14/22 1208                Disposition: discharge to nursing home in am    Beatriz King NP  Bone Marrow Transplant  [facility] - [facility] (Hospital)

## 2022-06-15 NOTE — PLAN OF CARE
Patient AOX4. POC reviewed and Patient verbalizes understanding, currently receiving her second units of blood, no s/s of adverse effect observed nor reports by patient, safety measure maintained, call light within reach.      Problem: Adult Inpatient Plan of Care  Goal: Plan of Care Review  Outcome: Ongoing, Progressing     Problem: Adult Inpatient Plan of Care  Goal: Patient-Specific Goal (Individualized)  Outcome: Ongoing, Progressing     Problem: Adult Inpatient Plan of Care  Goal: Optimal Comfort and Wellbeing  Outcome: Ongoing, Progressing     Problem: Adult Inpatient Plan of Care  Goal: Readiness for Transition of Care  Outcome: Ongoing, Progressing     Problem: Diabetes Comorbidity  Goal: Blood Glucose Level Within Targeted Range  Outcome: Ongoing, Progressing     Problem: Fall Injury Risk  Goal: Absence of Fall and Fall-Related Injury  Outcome: Ongoing, Progressing     Problem: Impaired Wound Healing  Goal: Optimal Wound Healing  Outcome: Ongoing, Progressing

## 2022-06-15 NOTE — PLAN OF CARE
Problem: Physical Therapy  Goal: Physical Therapy Goal  Description: Goals to be met by: 22     Patient will increase functional independence with mobility by performin. Supine to sit with [place]  2. Sit to supine with [place]  3. Sit to stand transfer with Modified [place]  4. Gait  x >150 feet with Stand-by Assistance using Rolling Walker.   5. Lower extremity exercise program x 20 reps per handout, with independence      Outcome: Ongoing, Progressing   Pt progressing, appropriate goals established

## 2022-06-15 NOTE — PROGRESS NOTES
[facility]                                  Skilled Nursing Facility                   Progress Note     Admit Date: 5/19/2022  SHABBIR   Principal Problem:  Acute respiratory failure with hypoxia   HPI obtained from patient interview and chart review     Chief Complaint: Re-evaluation of medical treatment and therapy status: Lab review, critical anemia    HPI:    Ms. Puente is a 71- year old patient with PMHx of MDS on treatment with Vidaza (currently receiving decitabine due to national Vidaza shortage; Dr. Valdes pt),  DM2, HTN, CAD, anxiety, and depression  Who presents to SNF following hospitalization for acute respiratory failure with hypoxia.    Interval history: All of today's labs reviewed and are listed below.  24 hr vital sign ranges listed below.  Patient's H&H is 4.9/16.  I feel this H&H is too low to wait for a redraw even the patient's vital signs are stable and she looks okay clinically.  We will have to send her to [facility] for further evaluation as to why her H&H is this low.  I also do not feel it is safe to provide patient with multiple unit blood transfusions in the SNF setting.  Patient denies shortness of breath, abdominal discomfort, nausea, or vomiting.  Patient reports an adequate appetite.  Patient denies dysuria.  Patient reports having regular bowel movements.  Patient progessing with PT/OT. Continuing to follow and treat all acute and chronic conditions.    Past Medical History: Patient has a past medical history of Allergic rhinitis, Allergy, Anemia, Anxiety, CAD (coronary artery disease), Carotid stenosis, Colon polyps (2016), Controlled type 2 diabetes mellitus without complication, without long-term current use of insulin (10/19/2016), Depression, GERD (gastroesophageal reflux disease), Hearing loss in right ear, psychiatric care, Hypokalemia (2/3/2020), MDS (myelodysplastic syndrome) with 5q deletion, Psychiatric  problem, and Therapy.    Past Surgical History: Patient has a past surgical history that includes Tonsillectomy; Carotid stent; Carotid endarterectomy (Left, 2011); Bunionectomy; Endometrial ablation; Colonoscopy (N/A, 12/20/2016); Tympanostomy tube placement; Bone marrow biopsy (Left, 6/7/2018); Bone marrow biopsy (Left, 3/21/2019); Bone marrow biopsy (Left, 10/24/2019); Insertion of tunneled central venous catheter (CVC) with subcutaneous port (N/A, 2/19/2020); and Bone marrow biopsy (Left, 4/21/2021).    Social History: Patient reports that she quit smoking about 5 years ago. Her smoking use included cigarettes and vaping with nicotine. She has a 75.00 pack-year smoking history. She has never used smokeless tobacco. She reports that she does not drink alcohol and does not use drugs.    Family History: family history includes Alcohol abuse in her brother and father; Cancer in her paternal grandmother; Heart disease in her father and mother; Hyperlipidemia in her mother.    Allergies: Patient is allergic to revlimid [lenalidomide].    ROS  Constitutional: Negative for fever   Eyes: Negative for blurred vision, double vision   Respiratory: Negative for cough, shortness of breath   Cardiovascular: Negative for chest pain, palpitations, and leg swelling.   Gastrointestinal: Negative for abdominal pain, constipation, diarrhea, nausea, vomiting.   Genitourinary: Negative for dysuria, frequency   Musculoskeletal:  + generalized weakness.  Improving pain to right foot  Skin: Negative for itching and rash.   Neurological: Negative for dizziness, headaches.   Psychiatric/Behavioral: Negative for depression. The patient is not nervous/anxious.      24 hour Vital Sign Range   Temp:  [96.3 °F (35.7 °C)-98.4 °F (36.9 °C)]   Pulse:  [63-75]   Resp:  [16-18]   BP: ()/(45-71)   SpO2:  [93 %-97 %]     PEx  Constitutional: Patient appears debilitated.  No distress noted  HENT:   Head: Normocephalic and atraumatic.   Eyes:  Pupils are equal, round  Neck: Normal range of motion. Neck supple.   Cardiovascular: Normal rate, regular rhythm and normal heart sounds.    Pulmonary/Chest: Effort normal and breath sounds are clear    Abdominal: Soft. Bowel sounds are normal.   Musculoskeletal: Normal range of motion.   Neurological: Alert and oriented to person, place, and time.   Psychiatric: Normal mood and affect. Behavior is normal.   Skin: Skin is warm and dry.  Redness to right foot a great toe, improving    No results for input(s): GLUCOSE, NA, K, CL, CO2, BUN, CREATININE, MG in the last 24 hours.    Invalid input(s):  CALCIUM    Recent Labs   Lab 06/15/22  0633   WBC 6.82   RBC 2.64*   HGB 7.5*   HCT 24.1*   *   MCV 91   MCH 28.4   MCHC 31.1*         Assessment and Plan:    Symptomatic anemia  - 6/10 initiated 1 unit RBC transfusion, type and screen ordered, blood consent obtained  - 6/14 decision made to send patient to [facility] for evaluation of H&H of 4.9/16     Acute gout of foot  - patient reports difficulty bearing weight on bilat feet due to pain, improving  - x-ray showing soft tissue edema suggestive of gout flare  - uric acid wnl  - allopurinol 100 mg daily for prevention  -   Prednisone taper also to treat  - continue colchicine 0.6 mg daily    Neuropathy  - home gabapentin 100 mg BID    Seasonal allergies  - cetirizine 10 mg daily    Azotemia  - slowly improving, continue to hold chlorthalidone at this time    Acute respiratory failure with hypoxia  - Patient admitted with acute hypoxic respiratory failure, she was treated for PNA during recent admission and was initiated on home oxygen after 6 min walk test previous admission.  - CXR with pulmonary consolidation and fever on admission, recently completed levaquin/azithro course for CAP.   - Completed Cefepime course.  - Continue scheduled DuoNebs  - 5/9: Pulmonary consulted given increasing O2 requirements.   - S/p Bronch 5/11 AM. AFB/KOH staining was negative. Gram stain  negative. Remaining cultures/studies NGTD. Bronch cytology with macrophages and inflammation.  - Continue Prednisone taper 60mg x 2wks, then taper down by 10mg every two weeks until complete.  - Given prolonged steroid taper, will continue GI ppx and dapsone for PJP ppx  - Will f/u with pulm outpatient   - 5/25 respiratory status improving, continue weaning efforts    MDS (myelodysplastic syndrome)  - Primary oncologist, Dr. Gita Valdes  [facility]:  - Initially with 5 q minus syndrome and treated with Revlimid. Developed allergy and was then desensitized. Soon after developed pancytopenia and Revlimid was stopped June 2017.    - BMBX on 6/8/17 was with normal cytogenetics. 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 Of 20 metaphases, 5 were normal and 15 had a 5q deletion, NGS positive for SF3B1 and TP53 (12%). 1.4% blasts  - Received vidaza for 22 cycles, received Decitabine for C23 due to national shortage of vidaza and then back to vidaza with C24.  - completed cycle 39 on 4/8/2  - due for cycle 40 on 05/02/2022, postponed whiled admitted and to be readdressed as outpatient   - 5/23 message sent to Oncology team, see HPI for details     Essential hypertension  - At home regimen: lisinopril/hctz and coreg  - Per [facility], Discussed with cardiology who recommended switching HCTZ to Chlorthalidone 25mg daily and adding Nifedipine XL 60mg with outpatient follow up. Nifedipine was held d/t hypotension and to allow for some permissible hypertension  given clinical status  -  Continue Current anti-HTN regimen: Lisinopril 40mg daily, Coreg 25mg BID, Chlorthalidone 25mg daily. Nifedipine XL 60mg on  - Will need cardiology f/u outpatient  - 5/23 reduce lisinopril to 20mg daily, BP low to normal      Pancytopenia due to antineoplastic chemotherapy  - Transfuse for hgb < 7 or plts < 10  - Continue acyclovir ppx  - Cefepime and fluconazole d/c'd 5/12 given normal WBC and afebrile   -  Monitor twice weekly CBCs     CAP (community acquired pneumonia)  - Pt with 2 weeks of productive cough, nasal congestion  - Upon arrival to the hospial she was hypoxic requiring 3L NC, weaned to 2L today  - CXR with likely develop consolidation  - CTA shows New patchy consolidative opacities in the in the right upper and posterior right lower lobes.  Findings suggestive infectious etiology such as pneumonia, multifocal pneumonia.  Also consider atypical given patient's reported immunocompromised status.  Aspiration could present similarly.  Consider continued follow-up after acute clinical illness has resolved to ensure resolution. New mediastinal and bilateral hilar lymphadenopathy, likely reactive.  - completed azithromycin and 7 day course of Levaquin on 4/30.  - CXR on admission showing possible developing consolidation: Cefepime 4/30-5/3    - See acute respiratory failure with hypoxia above     Controlled type 2 diabetes mellitus without complication, without long-term current use of insulin  - Will hold home po diabetic medications  -   Accu-Cheks AC/HS, diabetic diet  - Goal bg 140-180  -  Continue on low-dose sliding scale insulin    Insomnia  - Continue nightly melatonin 9 mg qHS, remeron 7.5 mg qHS.       Adjustment disorder with mixed anxiety and depressed mood  - continue home celexa 20 mg daily     CAD (coronary artery disease)  - S/P left carotid endarterectomy prior to cancer dx, s/p PCI (3 stents) >20 years ago   - Continue on ASA 81mg daily, platelets are wnl  - On  repatha as outpatient (statin intolerant)    Debility   - Continue with PT/OT for gait training and strengthening and restoration of ADL's   - Encourage mobility, OOB in chair, and early ambulation as appropriate  - Fall precautions   - Monitor for bowel and bladder dysfunction  - Monitor for and prevent skin breakdown and pressure ulcers  - Continue DVT prophylaxis with  frequent ambulation         Anticipate disposition:  Nursing home, PPD and chest x-ray ordered      Follow-up needed during SNF stay- Dr. Valdes 6/2    Appointments not to send patient to- 5/30, 5/31    Follow-up needed after discharge from SNF:   - PCP, hospital follow-up, needs to be scheduled  - Oncology, scheduled for after SNF discharge    Future Appointments   Date Time Provider Department Center   6/27/2022 11:00 AM [facility]app [facility] PC[facility]   7/11/2022  8:00 AM [facility] BMT [facility]   7/11/2022  9:00 AM Bravo Nevarez MD [facility] HC BMT [facility]   7/11/2022  9:30 AM INJECTION, [facility] INFUSION [facility] CHEMO [facility]   7/12/2022  9:30 AM INJECTION, [facility] INFUSION [facility] CHE[facility]   7/13/2022  9:30 AM INJECTION, [facility] INFUSION [facility] CHE[facility]   7/14/2022  9:30 AM INJECTION, [facility] INFUSION [facility] CHE[facility]   7/15/2022  9:30 AM INJECTION, [facility] INFUSION [facility] CHEMO [facility]         Fidelina Maxwell NP  Department of Hospital Medicine   [facility]     DOS: 6/14/2022       Patient note was created using MModal Dictation.  Any errors in syntax or even information may not have been identified and edited on initial review prior to signing this note.

## 2022-06-16 NOTE — ASSESSMENT & PLAN NOTE
PT/OT eval for placement  SNF PT recommended home with  PT/OT,   PT/OT reassment this am recommending SNF  Patient already has been accepted to nursing home, discharge today

## 2022-06-16 NOTE — PROGRESS NOTES
[facility] - Oncology ([facility])  Wound Care    Patient Name:  Susan Puente   MRN:  [id number]  Date: 2022  Diagnosis: MDS (myelodysplastic syndrome) with 5q deletion    History:     Past Medical History:   Diagnosis Date    Allergic rhinitis     Allergy     Anemia     Anxiety     CAD (coronary artery disease)     Carotid stenosis     Colon polyps     Controlled type 2 diabetes mellitus without complication, without long-term current use of insulin 10/19/2016    Depression     GERD (gastroesophageal reflux disease)     Hearing loss in right ear     Hx of psychiatric care     Celexa, Prozac    Hypokalemia 2/3/2020    MDS (myelodysplastic syndrome) with 5q deletion     Psychiatric problem     Therapy        Social History     Socioeconomic History    Marital status: Single   Tobacco Use    Smoking status: Former Smoker     Packs/day: 1.50     Years: 50.00     Pack years: 75.00     Types: Cigarettes, Vaping with nicotine     Quit date: 3/3/2017     Years since quittin.2    Smokeless tobacco: Never Used   Substance and Sexual Activity    Alcohol use: No    Drug use: No   Other Topics Concern    Patient feels they ought to cut down on drinking/drug use No    Patient annoyed by others criticizing their drinking/drug use No    Patient has felt bad or guilty about drinking/drug use No    Patient has had a drink/used drugs as an eye opener in the AM No   Social History Narrative    Single, never , no children, former , [facility]     Social Determinants of Health     Financial Resource Strain: Low Risk     Difficulty of Paying Living Expenses: Not hard at all   Food Insecurity: No Food Insecurity    Worried About Running Out of Food in the Last Year: Never true    Ran Out of Food in the Last Year: Never true   Transportation Needs: No Transportation Needs    Lack of Transportation (Medical): No    Lack of Transportation (Non-Medical): No   Physical Activity: Inactive    Days of  Exercise per Week: 0 days    Minutes of Exercise per Session: 0 min   Stress: No Stress Concern Present    Feeling of Stress : Only a little   Social Connections: Moderately Isolated    Frequency of Communication with Friends and Family: More than three times a week    Frequency of Social Gatherings with Friends and Family: More than three times a week    Attends [employer] Services: More than 4 times per year    Active Member of Clubs or Organizations: No    Attends Club or Organization Meetings: Never    Marital Status: Never    Housing Stability: Low Risk     Unable to Pay for Housing in the Last Year: No    Number of Places Lived in the Last Year: 1    Unstable Housing in the Last Year: No       Precautions:     Allergies as of 06/14/2022 - Reviewed 06/14/2022   Allergen Reaction Noted    Revlimid [lenalidomide] Swelling 06/08/2017       WO Assessment Details/Treatment     Wound care consulted for sacrum    Altered skin integrity noted to sacrum. Image and description attached below. Plan: nursing to clean with soap and water, pat dry, and apply triad cream BID and PRN. Can leave site open to air or cover with foam dressing if desired.     Recommendations made to primary team for above plan via secure chat. Orders placed.     06/16/2022 06/16/22 1456        Altered Skin Integrity 05/25/22 0750 Left Coccyx Incontinence associated dermatitis   Date First Assessed/Time First Assessed: 05/25/22 0750   Altered Skin Integrity Present on Admission: yes  Side: Left  Location: Coccyx  Primary Wound Type: Incontinence associated dermatitis   Wound Image    Dressing Appearance Dry;Intact;Clean   Drainage Amount None   Appearance Pink;Moist   Tissue loss description Partial thickness   Periwound Area Intact;Pink;Moist   Wound Edges Open   Care Cleansed with:;Soap and water;Applied:;Skin Barrier  (Triad cream)

## 2022-06-16 NOTE — ASSESSMENT & PLAN NOTE
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   Admission Hgb: 4.9  Baseline Hgb: 6.5-7.5  Patient received 2 units PRBC. hgb 7.3 this am.  Review of prior labs, low normal folic acid in 2019. Will add daily folic acid. Has elevated ferritin and iron studies from frequent transfusions, normal B12. Will repeat   Will transfuse 1 unit of PRBC prior to discharge today

## 2022-06-16 NOTE — PROGRESS NOTES
[facility] - Oncology ([facility])  Hematology  Bone Marrow Transplant  Progress Note    Patient Name: Susan Puente  Admission Date: 6/14/2022  Hospital Length of Stay: 1 days  Code Status: DNR    Subjective:     Interval History: VSS, NEON. Hgb 7.3 this am, will transfuse 1 unit PRBC prior to discharge to Health system today after transfusion. Added folic acid daily, folic acid level from 2019 was low normal    Objective:     Vital Signs (Most Recent):  Temp: 98.1 °F (36.7 °C) (06/16/22 0743)  Pulse: (Abnormal) 58 (06/16/22 0743)  Resp: 16 (06/16/22 0743)  BP: 128/60 (06/16/22 0743)  SpO2: (Abnormal) 90 % (06/16/22 0743)   Vital Signs (24h Range):  Temp:  [97.5 °F (36.4 °C)-98.2 °F (36.8 °C)] 98.1 °F (36.7 °C)  Pulse:  [58-77] 58  Resp:  [16-18] 16  SpO2:  [90 %-95 %] 90 %  BP: ()/(45-77) 128/60     Weight: 69.7 kg (153 lb 10.6 oz)  Body mass index is 25.57 kg/m².  Body surface area is 1.79 meters squared.    ECOG SCORE             Intake/Output - Last 3 Shifts       Intake/Output Category 06/14 0700 to 06/15 0659 06/15 0700 to 06/16 0659 06/16 0700 to 06/17 0659    P.O.   100    Blood 819.2      Total Intake(mL/kg) 819.2 (11.8)  100 (1.4)    Urine (mL/kg/hr) 340      Total Output 340      Net +479.2  +100           Urine Occurrence 3 x              Physical Exam  Constitutional:       General: She is not in acute distress.     Appearance: Normal appearance.   HENT:      Head: Normocephalic and atraumatic.      Mouth/Throat:      Mouth: Mucous membranes are moist.      Pharynx: Oropharynx is clear.   Eyes:      Conjunctiva/sclera: Conjunctivae normal.      Pupils: Pupils are equal, round, and reactive to light.   Cardiovascular:      Rate and Rhythm: Normal rate and regular rhythm.      Pulses: Normal pulses.      Heart sounds: Normal heart sounds.   Pulmonary:      Effort: Pulmonary effort is normal.      Breath sounds: Normal breath sounds.   Abdominal:      General: Abdomen is flat.       Palpations: Abdomen is soft.   Musculoskeletal:         General: No swelling or deformity.      Cervical back: Normal range of motion and neck supple. No tenderness.   Skin:     General: Skin is warm and dry.   Neurological:      General: No focal deficit present.      Mental Status: She is alert and oriented to person, place, and time.   Psychiatric:         Mood and Affect: Mood normal.         Behavior: Behavior normal.       Significant Labs:   CBC:   Recent Labs   Lab 06/14/22  1018 06/15/22  0633 06/16/22  0603   WBC 8.18 6.82 7.55   HGB 5.5* 7.5* 7.3*   HCT 18.0* 24.1* 23.5*   * 129* 139*    and CMP:   Recent Labs   Lab 06/14/22  1018      K 4.2      CO2 26   *   BUN 31*   CREATININE 0.8   CALCIUM 9.0   PROT 5.5*   ALBUMIN 3.2*   BILITOT 0.7   ALKPHOS 49*   AST 18   ALT 42   ANIONGAP 9   EGFRNONAA >60.0       Diagnostic Results:  None    Assessment/Plan:     * MDS (myelodysplastic syndrome) with 5q deletion  Due for cycle 40 of azacitidine on 5/2/22. Delayed due to multiple PNA admissions, needs for SNF.   Nursing home will transplant patient for future treatment, scheduled Vidaza to restart on 7/11    Anemia requiring transfusions  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   Admission Hgb: 4.9  Baseline Hgb: 6.5-7.5  Patient received 2 units PRBC. hgb 7.3 this am.  Review of prior labs, low normal folic acid in 2019. Will add daily folic acid. Has elevated ferritin and iron studies from frequent transfusions, normal B12. Will repeat   Will transfuse 1 unit of PRBC prior to discharge today      Physical debility  PT/OT eval for placement  SNF PT recommended home with  PT/OT,   PT/OT reassment this am recommending SNF  Patient already has been accepted to nursing home, discharge today    Controlled type 2 diabetes mellitus without  complication, without long-term current use of insulin  Home regimen: none  Last A1c: 5.8% on 5/20/22  - Diabetic diet  - Glucose 112 on admission on prednisone daily  - LDSSI for now. No scheduled    Long term (current) use of systemic steroids  Patient with hypoxemic respiratory failure thought to be inflammatory on prior admission after non-infectious bronchoscopy. Pulm sent her home with 60mg PO qd for 2 weeks and a taper by 10mg daily every 2 weeks.   - Continue Prednisone 40mg PO qd for now    Prednisone Schedule (based on pulm recs last admission):  5/12-5/26: 60mg qd  5/27-6/9: 50mg qd  6/10-6/23: 40mg qd  6/24-7/7: 30mg qd  7/8-7/21: 20mg qd  7/22-8/4: 10mg qd  8/5-8/11: 5mg qd    Essential hypertension  Continue home coreg 25mg BID, chlorthalidone 25mg qd, lisinopril 20mg qd, nifedipine 60mg qd    CAD (coronary artery disease)  Continue ASA, BB  No chest pain      VTE Risk Mitigation (From admission, onward)         Ordered     enoxaparin injection 40 mg  Daily         06/14/22 1208     IP VTE HIGH RISK PATIENT  Once         06/14/22 1208     Place sequential compression device  Until discontinued         06/14/22 1208                Disposition: discharge to nursing home today    Beatriz King NP  Bone Marrow Transplant  [facility] - Oncology (Hospital)

## 2022-06-16 NOTE — PLAN OF CARE
Patient AOX3 very pleasant. POC reviewed, patient verbalizes understanding. Call light within reach    Problem: Adult Inpatient Plan of Care  Goal: Plan of Care Review  Outcome: Ongoing, Progressing     Problem: Adult Inpatient Plan of Care  Goal: Absence of Hospital-Acquired Illness or Injury  Outcome: Ongoing, Progressing     Problem: Adult Inpatient Plan of Care  Goal: Optimal Comfort and Wellbeing  Outcome: Ongoing, Progressing     Problem: Adult Inpatient Plan of Care  Goal: Readiness for Transition of Care  Outcome: Ongoing, Progressing     Problem: Diabetes Comorbidity  Goal: Blood Glucose Level Within Targeted Range  Outcome: Ongoing, Progressing     Problem: Bleeding (Sepsis/Septic Shock)  Goal: Absence of Bleeding  Outcome: Ongoing, Progressing

## 2022-06-16 NOTE — SUBJECTIVE & OBJECTIVE
Subjective:     Interval History: VSS, NEON. Hgb 7.3 this am, will transfuse 1 unit PRBC prior to discharge to [facility] today after transfusion. Added folic acid daily, folic acid level from 2019 was low normal    Objective:     Vital Signs (Most Recent):  Temp: 98.1 °F (36.7 °C) (06/16/22 0743)  Pulse: (Abnormal) 58 (06/16/22 0743)  Resp: 16 (06/16/22 0743)  BP: 128/60 (06/16/22 0743)  SpO2: (Abnormal) 90 % (06/16/22 0743)   Vital Signs (24h Range):  Temp:  [97.5 °F (36.4 °C)-98.2 °F (36.8 °C)] 98.1 °F (36.7 °C)  Pulse:  [58-77] 58  Resp:  [16-18] 16  SpO2:  [90 %-95 %] 90 %  BP: ()/(45-77) 128/60     Weight: 69.7 kg (153 lb 10.6 oz)  Body mass index is 25.57 kg/m².  Body surface area is 1.79 meters squared.    ECOG SCORE             Intake/Output - Last 3 Shifts       Intake/Output Category 06/14 0700 to 06/15 0659 06/15 0700 to 06/16 0659 06/16 0700 to 06/17 0659    P.O.   100    Blood 819.2      Total Intake(mL/kg) 819.2 (11.8)  100 (1.4)    Urine (mL/kg/hr) 340      Total Output 340      Net +479.2  +100           Urine Occurrence 3 x              Physical Exam  Constitutional:       General: She is not in acute distress.     Appearance: Normal appearance.   HENT:      Head: Normocephalic and atraumatic.      Mouth/Throat:      Mouth: Mucous membranes are moist.      Pharynx: Oropharynx is clear.   Eyes:      Conjunctiva/sclera: Conjunctivae normal.      Pupils: Pupils are equal, round, and reactive to light.   Cardiovascular:      Rate and Rhythm: Normal rate and regular rhythm.      Pulses: Normal pulses.      Heart sounds: Normal heart sounds.   Pulmonary:      Effort: Pulmonary effort is normal.      Breath sounds: Normal breath sounds.   Abdominal:      General: Abdomen is flat.      Palpations: Abdomen is soft.   Musculoskeletal:         General: No swelling or deformity.      Cervical back: Normal range of motion and neck supple. No tenderness.   Skin:     General: Skin is warm  and dry.   Neurological:      General: No focal deficit present.      Mental Status: She is alert and oriented to person, place, and time.   Psychiatric:         Mood and Affect: Mood normal.         Behavior: Behavior normal.       Significant Labs:   CBC:   Recent Labs   Lab 06/14/22  1018 06/15/22  0633 06/16/22  0603   WBC 8.18 6.82 7.55   HGB 5.5* 7.5* 7.3*   HCT 18.0* 24.1* 23.5*   * 129* 139*    and CMP:   Recent Labs   Lab 06/14/22  1018      K 4.2      CO2 26   *   BUN 31*   CREATININE 0.8   CALCIUM 9.0   PROT 5.5*   ALBUMIN 3.2*   BILITOT 0.7   ALKPHOS 49*   AST 18   ALT 42   ANIONGAP 9   EGFRNONAA >60.0       Diagnostic Results:  None

## 2022-06-16 NOTE — PLAN OF CARE
ACHS, coverage required. Pt ambulated with walker in room. 1 PRBC given this shift, ot tolerated well. Report called to Jonathan at [facility]. Discharge instructions and prescriptions given and explained to pt.  Pt. verbalized understanding with no further questions. VS WDL.  Patient is awaiting ride home by van transportation to nursing home.

## 2022-06-16 NOTE — PLAN OF CARE
Pt up to chair at bedside no complaints voiced. Vital signs stable. Call light and belongings in reach.

## 2022-06-17 NOTE — NURSING
1910 patient left in w/c with transport, patient own medication that was in the fridge released to patient, took it with her.

## 2022-06-17 NOTE — DISCHARGE SUMMARY
[facility] ([facility])  Hematology  Bone Marrow Transplant  Discharge Summary      Patient Name: Susan Puente  MRN: [id number]  Admission Date: 6/14/2022  Hospital Length of Stay: 1 days  Discharge Date and Time: 6/16/2022  7:10 PM  Attending Physician: No att. providers found   Discharging Provider: Beatriz King NP  Primary Care Provider: Claudia Rapp MD    HPI: Susan Puente is a 71yoF who follows with Dr. Valdes for MDS on treatment with azacitidine (currently receiving decitabine due to national azacitidine shortage), DM2, HTN, CAD, gout, anxiety, and depression who presents to the hospital from SNF with Hgb 4.9 on labwork. She has had 2 recent hospitalizations since April, both for pneumonia and related hypoxemic respiratory failure. She is no longer on treatment dose antibiotics, but was discharged from both hospitalizations to SNF for weakness and deconditioning. Her last discharge was on 5/19. She states she has been asymptomatic for anemia, but Hgb 4.9 is lower than her baseline around 6.5-7.5. She denies SOB, VENEGAS, CP, lightheadedness, fatigue, or weakness. No bleeding sources. She was scheduled originally for her 40th cycle of azacitidine on 5/2/22, but it has been delayed by hospitalizations and stays at SNF. Given 1u (second unit ordered but blood bank attempting to get a match) RBCs in ED and admitted to BMT service.       * No surgery found *     Hospital Course: 06/15/2022 Admitted yesterday evening for anemia, hgb 4.9 at SNF. Transfused 2 units PRBC overnight and hgb improved to 7.5 today. VSS and no other active issues. Patient was transferred from SNF, already had plans to discharge to nursing home. CXR, PPD and Covid testing already completed. Unable to get paperwork completed in time for transfer to today. Will discharge to nursing home in am.   06/16/2022 VSS, NEON. Hgb 7.3 this am, will transfuse 1 unit PRBC prior to discharge to [facility] today after  transfusion. Added folic acid daily, folic acid level from 2019 was low normal.       Goals of Care Treatment Preferences:  Code Status: DNR    Living Will: Yes                  Significant Diagnostic Studies: Labs:   CMP No results for input(s): NA, K, CL, CO2, GLU, BUN, CREATININE, CALCIUM, PROT, ALBUMIN, BILITOT, ALKPHOS, AST, ALT, ANIONGAP, ESTGFRAFRICA, EGFRNONAA in the last 48 hours. and CBC   Recent Labs   Lab 06/16/22  0603   WBC 7.55   HGB 7.3*   HCT 23.5*   *       Pending Diagnostic Studies:     None        Final Active Diagnoses:    Diagnosis Date Noted POA    PRINCIPAL PROBLEM:  MDS (myelodysplastic syndrome) with 5q deletion [D46.C] 06/08/2017 Yes    Anemia requiring transfusions [D64.9] 11/10/2016 Yes    Physical debility [R53.81] 04/29/2022 Yes    Controlled type 2 diabetes mellitus without complication, without long-term current use of insulin [E11.9] 07/17/2021 Yes    Long term (current) use of systemic steroids [Z79.52] 06/14/2022 Not Applicable    Essential hypertension [I10] 12/01/2016 Yes    CAD (coronary artery disease) [I25.10]  Yes      Problems Resolved During this Admission:      Discharged Condition: good    Disposition: Another Health Care Inst*    Follow Up:   Future Appointments   Date Time Provider Department Center   6/27/2022 11:00 AM Claudia Rapp MD [facility] [address] PCW   7/11/2022  8:00 AM [facility]   7/11/2022  9:00 AM Bravo Nevarez MD [facility] BMT [facility]   7/11/2022  9:30 AM INJECTION, [facility]   7/12/2022  9:30 AM INJECTION, [facility]   7/13/2022  9:30 AM INJECTION, [facility]   7/14/2022  9:30 AM INJECTION, [facility]   7/15/2022  9:30 AM INJECTION, [facility]       Patient Instructions:      WALKER FOR HOME USE     Order Specific Question Answer Comments   Type of Walker: Adult  "(5'4"-6'6")    With wheels? Yes    Height: 5' 5" (1.651 m)    Weight: 69.7 kg (153 lb 10.6 oz)    Length of need (1-99 months): 99    Does patient have medical equipment at home? rollator    Does patient have medical equipment at home? shower chair    Please check all that apply: Patient is unable to safely ambulate without equipment.      WHEELCHAIR FOR HOME USE     Order Specific Question Answer Comments   Hours in W/C per day: 2    Type of Wheelchair: Standard    Size(Width): 18"(STD adult)    Leg Support: STD footrests    Lap Belt: Velcro    Accessories: Front brakes    Cushion: Basic    Height: 5' 5" (1.651 m)    Weight: 69.7 kg (153 lb 10.6 oz)    Does patient have medical equipment at home? rollator    Does patient have medical equipment at home? shower chair    Length of need (1-99 months): 99    Please check all that apply: Caregiver is capable and willing to operate wheelchair safely.    Please check all that apply: Patient mobility limitations cannot be sufficiently resolved by the use of other ambulatory therapies.      FOLATE   Standing Status: Future Standing Exp. Date: 08/15/23     VITAMIN B12   Standing Status: Future Standing Exp. Date: 08/15/23     Notify your health care provider if you experience any of the following:  temperature >100.4     Notify your health care provider if you experience any of the following:  persistent nausea and vomiting or diarrhea     Notify your health care provider if you experience any of the following:  severe uncontrolled pain     Notify your health care provider if you experience any of the following:  severe persistent headache     Notify your health care provider if you experience any of the following:  worsening rash     Notify your health care provider if you experience any of the following:  increased confusion or weakness     Notify your health care provider if you experience any of the following:  persistent dizziness, light-headedness, or visual " disturbances     Notify your health care provider if you experience any of the following:  difficulty breathing or increased cough     Activity as tolerated     Medications:  Reconciled Home Medications:      Medication List      Start taking these medications    ciprofloxacin HCl 500 MG tablet  Commonly known as: CIPRO  Take 1 tablet (500 mg total) by mouth every 12 (twelve) hours.     folic acid 1 MG tablet  Commonly known as: FOLVITE  Take 1 tablet (1 mg total) by mouth once daily.        Continue taking these medications    acyclovir 400 MG tablet  Commonly known as: ZOVIRAX  Take 1 tablet (400 mg total) by mouth 2 (two) times daily.     allopurinoL 100 MG tablet  Commonly known as: ZYLOPRIM  Take 1 tablet (100 mg total) by mouth once daily.     aspirin 81 MG EC tablet  Commonly known as: ECOTRIN  Take 1 tablet (81 mg total) by mouth once daily.     carvediloL 25 MG tablet  Commonly known as: COREG  Take 1 tablet (25 mg total) by mouth 2 (two) times daily with meals.     chlorthalidone 25 MG Tab  Commonly known as: HYGROTEN  Take 1 tablet (25 mg total) by mouth once daily.     citalopram 20 MG tablet  Commonly known as: CeleXA  Take 1 tablet (20 mg total) by mouth once daily.     dapsone 100 MG Tab  Take 1 tablet (100 mg total) by mouth once daily.     deferasirox 500 MG disintegrating tablet  Commonly known as: EXJADE  Take 3 tablets (1,500 mg total) by mouth before breakfast.     ENSURE ORAL  Take by mouth daily as needed (supplement).     fluconazole 200 MG Tab  Commonly known as: DIFLUCAN  Take 2 tablets (400 mg total) by mouth once daily.     FREESTYLE MARIANA 14 DAY READER Misc  Generic drug: flash glucose scanning reader  Use 4 or more times per day to check blood sugar     FREESTYLE MARIANA 14 DAY SENSOR Kit  Generic drug: flash glucose sensor  Use qid to check blood sugar     gabapentin 100 MG capsule  Commonly known as: NEURONTIN  Take 1 capsule (100 mg total) by mouth 2 (two) times daily.     ketotifen  0.025 % (0.035 %) ophthalmic solution  Commonly known as: ZADITOR  Place 2-3 drops into both eyes daily as needed (Allergies).     lisinopriL 20 MG tablet  Commonly known as: PRINIVIL,ZESTRIL  Take 1 tablet (20 mg total) by mouth once daily.     loperamide 2 mg capsule  Commonly known as: IMODIUM  Take 4 mg by mouth daily as needed for Diarrhea.     loratadine 10 mg tablet  Commonly known as: CLARITIN  Take 10 mg by mouth daily as needed.     magnesium oxide 400 mg (241.3 mg magnesium) tablet  Commonly known as: MAG-OX  Take 2 tablets (800 mg total) by mouth 2 (two) times daily.     melatonin 5 mg  Commonly known as: MELATIN  Take 10 mg by mouth every evening.     MIRALAX 17 gram Pwpk  Generic drug: polyethylene glycol  Take 17 g by mouth daily as needed (Constipation).     mirtazapine 7.5 MG Tab  Commonly known as: REMERON  Take 1 tablet (7.5 mg total) by mouth every evening.     MULTIVITAMIN ORAL  Take 1 tablet by mouth once daily.     NIFEdipine 60 MG (OSM) 24 hr tablet  Commonly known as: PROCARDIA-XL  Take 1 tablet (60 mg total) by mouth once daily.     ondansetron 8 MG Tbdl  Commonly known as: ZOFRAN-ODT  Dissolve 1 tablet (8 mg total) by mouth every 8 (eight) hours as needed.     pantoprazole 40 MG tablet  Commonly known as: PROTONIX  Take 1 tablet (40 mg total) by mouth once daily.     potassium chloride SA 20 MEQ tablet  Commonly known as: K-DUR,KLOR-CON  Take 1 tablet (20 mEq total) by mouth once daily.     predniSONE 10 MG tablet  Commonly known as: DELTASONE  Take 6 tabs by mouth once daily for 8 days, THEN 5 tabs once daily for 14 days, 4 tabs daily for 14 days, 3 tabs daily for 14 days, 2 tabs daily for 14 days, 1 tab once daily for 14 days, 0.5 tab once daily for 7 days.  Start taking on: May 19, 2022     REPATHA SURECLICK 140 mg/mL Pnij  Generic drug: evolocumab  Inject 140mg (1 pen) into the skin every 2 weeks.     TRUEPLUS LANCETS 30 gauge Misc  Generic drug: lancets  Use to test blood sugar  daily     VIDAZA INJ  Inject as directed every 28 days.        Stop taking these medications    acetaminophen 650 MG Tbsr  Commonly known as: TYLENOL     lisinopriL-hydrochlorothiazide 20-12.5 mg per tablet  Commonly known as: EDWARD BUCK NP  Bone Marrow Transplant  [facility] ([facility])

## 2022-06-21 NOTE — TELEPHONE ENCOUNTER
Specialty Pharmacy - Refill Coordination    Specialty Medication Orders Linked to Encounter    Flowsheet Row Most Recent Value   Medication #1 evolocumab (REPATHA SURECLICK) 140 mg/mL PnIj (Order#[id number], Rx#[id number])          Refill Questions - Documented Responses    Flowsheet Row Most Recent Value   Patient Availability and HIPAA Verification    Does patient want to proceed with activity? Yes   HIPAA/medical authority confirmed? Yes   Relationship to patient of person spoken to? Self   Refill Screening Questions    Would patient like to speak to a pharmacist? No   When does the patient need to receive the medication? 06/24/22   Refill Delivery Questions    How will the patient receive the medication? Delivery Patricia   When does the patient need to receive the medication? 06/24/22   Shipping Address Home   Address in [facility] confirmed and updated if neccessary? Yes   Expected Copay ($) 0   Is the patient able to afford the medication copay? Yes   Payment Method zero copay   Days supply of Refill 84   Supplies needed? No supplies needed   Refill activity completed? Yes   Refill activity plan Refill scheduled   Shipment/Pickup Date: 06/23/22          Current Outpatient Medications   Medication Sig    acyclovir (ZOVIRAX) 400 MG tablet Take 1 tablet (400 mg total) by mouth 2 (two) times daily.    allopurinoL (ZYLOPRIM) 100 MG tablet Take 1 tablet (100 mg total) by mouth once daily.    aspirin (ECOTRIN) 81 MG EC tablet Take 1 tablet (81 mg total) by mouth once daily.    azacitidine (VIDAZA INJ) Inject as directed every 28 days.    carvediloL (COREG) 25 MG tablet Take 1 tablet (25 mg total) by mouth 2 (two) times daily with meals.    chlorthalidone (HYGROTEN) 25 MG Tab Take 1 tablet (25 mg total) by mouth once daily.    ciprofloxacin HCl (CIPRO) 500 MG tablet Take 1 tablet (500 mg total) by mouth every 12 (twelve) hours.    citalopram (CELEXA) 20 MG tablet Take 1 tablet (20 mg total) by mouth once  daily.    dapsone 100 MG Tab Take 1 tablet (100 mg total) by mouth once daily.    deferasirox (EXJADE) 500 MG disintegrating tablet Take 3 tablets (1,500 mg total) by mouth before breakfast.    evolocumab (REPATHA SURECLICK) 140 mg/mL PnIj Inject 140mg (1 pen) into the skin every 2 weeks. (Patient taking differently: Inject 140mg (1 pen) into the skin every 2 weeks.)    flash glucose scanning reader (IBUonlineSTYLE MARIANA 14 DAY READER) Misc Use 4 or more times per day to check blood sugar    flash glucose sensor (FREESTYLE MARIANA 14 DAY SENSOR) Kit Use qid to check blood sugar    fluconazole (DIFLUCAN) 200 MG Tab Take 2 tablets (400 mg total) by mouth once daily.    folic acid (FOLVITE) 1 MG tablet Take 1 tablet (1 mg total) by mouth once daily.    gabapentin (NEURONTIN) 100 MG capsule Take 1 capsule (100 mg total) by mouth 2 (two) times daily.    ketotifen (ZADITOR) 0.025 % (0.035 %) ophthalmic solution Place 2-3 drops into both eyes daily as needed (Allergies).    lactose-reduced food (ENSURE ORAL) Take by mouth daily as needed (supplement).    lancets 30 gauge Misc Use to test blood sugar daily (Patient taking differently: Use to test blood sugar daily)    lisinopriL (PRINIVIL,ZESTRIL) 20 MG tablet Take 1 tablet (20 mg total) by mouth once daily.    loperamide (IMODIUM) 2 mg capsule Take 4 mg by mouth daily as needed for Diarrhea.    loratadine (CLARITIN) 10 mg tablet Take 10 mg by mouth daily as needed.     melatonin (MELATIN) 5 mg Take 10 mg by mouth every evening.    mirtazapine (REMERON) 7.5 MG Tab Take 1 tablet (7.5 mg total) by mouth every evening.    MULTIVITAMIN ORAL Take 1 tablet by mouth once daily.    NIFEdipine (PROCARDIA-XL) 60 MG (OSM) 24 hr tablet Take 1 tablet (60 mg total) by mouth once daily.    ondansetron (ZOFRAN-ODT) 8 MG TbDL Dissolve 1 tablet (8 mg total) by mouth every 8 (eight) hours as needed.    pantoprazole (PROTONIX) 40 MG tablet Take 1 tablet (40 mg total) by mouth once  daily.    polyethylene glycol (MIRALAX) 17 gram PwPk Take 17 g by mouth daily as needed (Constipation).    potassium chloride SA (K-DUR,KLOR-CON) 20 MEQ tablet Take 1 tablet (20 mEq total) by mouth once daily.    predniSONE (DELTASONE) 10 MG tablet Take 6 tabs by mouth once daily for 8 days, THEN 5 tabs once daily for 14 days, 4 tabs daily for 14 days, 3 tabs daily for 14 days, 2 tabs daily for 14 days, 1 tab once daily for 14 days, 0.5 tab once daily for 7 days.   Last reviewed on 6/14/2022  9:52 AM by Trisha Lima RN    Review of patient's allergies indicates:   Allergen Reactions    Revlimid [lenalidomide] Swelling     Facial swelling  6/8/17 - in the process of desensitation    Last reviewed on  6/14/2022 4:36 PM by Jessica Chong      Tasks added this encounter   No tasks added.   Tasks due within next 3 months   6/6/2022 - Refill Call (Auto Added)     Alessandro PharmD  [facility]  [address] [place] [id number]  Phone: [phone]  Fax: [phone]

## 2022-06-21 NOTE — ED PROVIDER NOTES
Encounter Date: 6/21/2022       History     Chief Complaint   Patient presents with    Abnormal Lab     Low H & H     Patient sent to the ED from the nursing home where she resides after she was found to have a hemoglobin of 6.9 on outpatient labs performed earlier today.  She complains of 2-3 days of mild fatigue.  She has a history of MDS requiring blood transfusions.  She last received a blood transfusion last week.  She denies headache, lightheadedness, dizziness, changes in vision, chest pain, palpitations, shortness of breath, abdominal pain, nausea, and vomiting.    She has a past medical history of Allergic rhinitis, Allergy, Anemia, Anxiety, CAD (coronary artery disease), Carotid stenosis, Colon polyps (2016), Controlled type 2 diabetes mellitus without complication, without long-term current use of insulin (10/19/2016), Depression, GERD (gastroesophageal reflux disease), Hearing loss in right ear, psychiatric care, Hypokalemia (2/3/2020), MDS (myelodysplastic syndrome) with 5q deletion, Psychiatric problem, and Therapy.    The history is provided by the patient. No  was used.     Review of patient's allergies indicates:   Allergen Reactions    Revlimid [lenalidomide] Swelling     Facial swelling  6/8/17 - in the process of desensitation     Past Medical History:   Diagnosis Date    Allergic rhinitis     Allergy     Anemia     Anxiety     CAD (coronary artery disease)     Carotid stenosis     Colon polyps 2016    Controlled type 2 diabetes mellitus without complication, without long-term current use of insulin 10/19/2016    Depression     GERD (gastroesophageal reflux disease)     Hearing loss in right ear     Hx of psychiatric care     Celexa, Prozac    Hypokalemia 2/3/2020    MDS (myelodysplastic syndrome) with 5q deletion     Psychiatric problem     Therapy      Past Surgical History:   Procedure Laterality Date    BONE MARROW BIOPSY Left 6/7/2018    Procedure:  Biopsy-bone marrow;  Surgeon: Gita Valdes MD;  [facility];  Service: Oncology;  Laterality: Left;    BONE MARROW BIOPSY Left 3/21/2019    Procedure: Biopsy-bone marrow;  Surgeon: Gita Valdes MD;  [facility];  Service: Oncology;  Laterality: Left;    BONE MARROW BIOPSY Left 10/24/2019    Procedure: Biopsy-bone marrow;  Surgeon: Gita Valdes MD;  [facility];  Service: Oncology;  Laterality: Left;  left iliac crest    BONE MARROW BIOPSY Left 2021    Procedure: Biopsy-bone marrow;  Surgeon: Gita Valdes MD;  Location: [facility] ENDO (4TH FLR);  Service: Oncology;  Laterality: Left;    BUNIONECTOMY      CAROTID ENDARTERECTOMY Left     CAROTID STENT      COLONOSCOPY N/A 2016    Procedure: COLONOSCOPY;  Surgeon: PATRICIA Simpson MD;  Location: [facility] ENDO (4TH FLR);  Service: Endoscopy;  Laterality: N/A;  pt has vomiting with anesthesia in past    ENDOMETRIAL ABLATION      for endometriosis    INSERTION OF TUNNELED CENTRAL VENOUS CATHETER (CVC) WITH SUBCUTANEOUS PORT N/A 2020    Procedure: [id number];  Surgeon: Dosc Diagnostic Provider;  [facility];  Service: Radiology;  Laterality: N/A;  189    TONSILLECTOMY      TYMPANOSTOMY TUBE PLACEMENT       Family History   Problem Relation Age of Onset    Heart disease Mother     Hyperlipidemia Mother     Heart disease Father     Alcohol abuse Father     Cancer Paternal Grandmother         smoker; lung?    Alcohol abuse Brother         recovering    Colon cancer Neg Hx     Breast cancer Neg Hx      Social History     Tobacco Use    Smoking status: Former Smoker     Packs/day: 1.50     Years: 50.00     Pack years: 75.00     Types: Cigarettes, Vaping with nicotine     Quit date: 3/3/2017     Years since quittin.3    Smokeless tobacco: Never Used   Substance Use Topics    Alcohol use: No    Drug use: No     Review of Systems   Constitutional: Positive for fatigue. Negative for  chills and fever.   Eyes: Negative for visual disturbance.   Respiratory: Negative for cough and shortness of breath.    Cardiovascular: Negative for chest pain, palpitations and leg swelling.   Gastrointestinal: Negative for abdominal pain, diarrhea, nausea and vomiting.   Genitourinary: Negative for dysuria.   Musculoskeletal: Negative for arthralgias.   Neurological: Negative for dizziness, syncope, light-headedness and headaches.       Physical Exam     Initial Vitals   BP Pulse Resp Temp SpO2   06/21/22 1507 06/21/22 1507 06/21/22 1507 06/21/22 1657 06/21/22 1507   130/63 73 18 98.3 °F (36.8 °C) 96 %      MAP       --                Physical Exam    Nursing note and vitals reviewed.  Constitutional: She is not diaphoretic. No distress.   HENT:   Head: Normocephalic and atraumatic.   Eyes: Conjunctivae are normal. No scleral icterus.   Neck: No JVD present.   Cardiovascular: Normal rate, regular rhythm and normal heart sounds. Exam reveals no gallop and no friction rub.    No murmur heard.  Pulmonary/Chest: Effort normal and breath sounds normal. No stridor. No respiratory distress. She has no decreased breath sounds. She has no wheezes. She has no rhonchi. She has no rales.   Abdominal: Abdomen is soft. She exhibits no distension. There is no abdominal tenderness.     Neurological: She is alert and oriented to person, place, and time. She has normal strength. Coordination normal. GCS score is 15. GCS eye subscore is 4. GCS verbal subscore is 5. GCS motor subscore is 6.   Cranial nerves grossly intact.   Skin: Skin is warm and dry. No pallor.   Psychiatric: She has a normal mood and affect. Her speech is normal and behavior is normal. She is not actively hallucinating. She is attentive.         ED Course   Procedures  Labs Reviewed   CBC W/ AUTO DIFFERENTIAL - Abnormal; Notable for the following components:       Result Value    WBC 3.81 (*)     RBC 3.58 (*)     Hemoglobin 10.3 (*)     Hematocrit 33.7 (*)      MCHC 30.6 (*)     RDW 16.9 (*)     Platelets 91 (*)     Lymph % 15.0 (*)     Mono % 3.0 (*)     Eosinophil % 11.0 (*)     Platelet Estimate Decreased (*)     All other components within normal limits          Imaging Results    None          Medications   heparin, porcine (PF) 100 unit/mL injection flush 500 Units (500 Units Intravenous Given 6/21/22 1118)     Medical Decision Making:   History:   Old Medical Records: I decided to obtain old medical records.  Old Records Summarized: other records.       <> Summary of Records: Past medical history reviewed as above.  Clinical Tests:   Lab Tests: Ordered and Reviewed                      Clinical Impression:   Final diagnoses:  [D64.9] Chronic anemia (Primary)          ED Disposition Condition    Discharge Stable        ED Prescriptions     None        Follow-up Information     Follow up With Specialties Details Why Contact Info    ER   Return to this ER or visit any other ER should you have any concerns that you feel need immediate attention.            Adama Valverde III, MD  06/21/22 3882

## 2022-06-21 NOTE — ED NOTES
Pt to ED from [facility] via EMS for eval of low H&H/requesting transfusion. Pt reports hx MDS with last chemo in April. Frequent transfusions due to anemia. PT reports chills and headache for a couple days.

## 2022-06-21 NOTE — PROGRESS NOTES
[facility]   New Visit Progress Note   Recent Hospital Discharge     PRESENTING HISTORY     Chief Complaint/Reason for Admission:  Follow up Hospital Discharge   PCP: Claudia Rapp MD    History of Present Illness:  Ms. Susan Puente is a 71 y.o. female who was recently admitted to the hospital.Admitted yesterday evening for anemia, hgb 4.9 at SNF. Transfused 2 units PRBC overnight and hgb improved to 7.5 today. VSS and no other active issues. Patient was transferred from SNF, already had plans to discharge to nursing home. CXR, PPD and Covid testing already completed. Unable to get paperwork completed in time for transfer to today. Will discharge to nursing home in am.             ___________________________________________________________________    Today: New admit, MDS (myelodysplastic syndrome) with 5q deletion  Due for cycle 40 of azacitidine on 5/2/22. Delayed due to multiple PNA admissions, needs for SNF.   Nursing home will transplant patient for future treatment, scheduled Vidaza to restart on 7/11  Will have weekly CBC, transfusion at transfusion clinic as needed.        Review of Systems  General ROS: negative for chills, fever or weight loss  Psychological ROS: negative for hallucination, depression or suicidal ideation  Ophthalmic ROS: negative for blurry vision, photophobia or eye pain  ENT ROS: negative for epistaxis, sore throat or rhinorrhea  Respiratory ROS: no cough, shortness of breath, or wheezing  Cardiovascular ROS: no chest pain or dyspnea on exertion  Gastrointestinal ROS: no abdominal pain, change in bowel habits, or black/ bloody stools  Genito-Urinary ROS: no dysuria, trouble voiding, or hematuria  Musculoskeletal ROS: negative for gait disturbance or muscular weakness  Neurological ROS: no syncope or seizures; no ataxia  Dermatological ROS: negative for pruritis, rash and jaundice          PAST HISTORY:     Past Medical History:   Diagnosis Date     Allergic rhinitis     Allergy     Anemia     Anxiety     CAD (coronary artery disease)     Carotid stenosis     Colon polyps 2016    Controlled type 2 diabetes mellitus without complication, without long-term current use of insulin 10/19/2016    Depression     GERD (gastroesophageal reflux disease)     Hearing loss in right ear     Hx of psychiatric care     Celexa, Prozac    Hypokalemia 2/3/2020    MDS (myelodysplastic syndrome) with 5q deletion     Psychiatric problem     Therapy        Past Surgical History:   Procedure Laterality Date    BONE MARROW BIOPSY Left 6/7/2018    Procedure: Biopsy-bone marrow;  Surgeon: Gita Valdes MD;  Location: [facility] OR [address] [place] [address];  Service: Oncology;  Laterality: Left;    BONE MARROW BIOPSY Left 3/21/2019    Procedure: Biopsy-bone marrow;  Surgeon: Gita Valdes MD;  Location: [facility] OR [address] [place] [address];  Service: Oncology;  Laterality: Left;    BONE MARROW BIOPSY Left 10/24/2019    Procedure: Biopsy-bone marrow;  Surgeon: Gita Valdes MD;  Location: [facility] OR [address] [place] [address];  Service: Oncology;  Laterality: Left;  left iliac crest    BONE MARROW BIOPSY Left 4/21/2021    Procedure: Biopsy-bone marrow;  Surgeon: Gita Valdes MD;  Location: [facility] (4TH FLR);  Service: Oncology;  Laterality: Left;    BUNIONECTOMY      CAROTID ENDARTERECTOMY Left 2011    CAROTID STENT      COLONOSCOPY N/A 12/20/2016    Procedure: COLONOSCOPY;  Surgeon: PATRICIA Simpson MD;  Location: [facility] ENDO (4TH FLR);  Service: Endoscopy;  Laterality: N/A;  pt has vomiting with anesthesia in past    ENDOMETRIAL ABLATION      for endometriosis    INSERTION OF TUNNELED CENTRAL VENOUS CATHETER (CVC) WITH SUBCUTANEOUS PORT N/A 2/19/2020    Procedure: [id number];  Surgeon: Dosc Diagnostic Provider;  Location: [facility] OR [address] [place] [address];  Service: Radiology;  Laterality: N/A;  189    TONSILLECTOMY      TYMPANOSTOMY TUBE PLACEMENT         Family History   Problem Relation Age of Onset    Heart disease Mother      Hyperlipidemia Mother     Heart disease Father     Alcohol abuse Father     Cancer Paternal Grandmother         smoker; lung?    Alcohol abuse Brother         recovering    Colon cancer Neg Hx     Breast cancer Neg Hx          MEDICATIONS & ALLERGIES:     Current Outpatient Medications on File Prior to Visit   Medication Sig Dispense Refill    acyclovir (ZOVIRAX) 400 MG tablet Take 1 tablet (400 mg total) by mouth 2 (two) times daily. 60 tablet 6    allopurinoL (ZYLOPRIM) 100 MG tablet Take 1 tablet (100 mg total) by mouth once daily. 30 tablet 3    aspirin (ECOTRIN) 81 MG EC tablet Take 1 tablet (81 mg total) by mouth once daily. 30 tablet 11    azacitidine (VIDAZA INJ) Inject as directed every 28 days.      carvediloL (COREG) 25 MG tablet Take 1 tablet (25 mg total) by mouth 2 (two) times daily with meals. 60 tablet 11    chlorthalidone (HYGROTEN) 25 MG Tab Take 1 tablet (25 mg total) by mouth once daily. 30 tablet 11    ciprofloxacin HCl (CIPRO) 500 MG tablet Take 1 tablet (500 mg total) by mouth every 12 (twelve) hours. 180 tablet 0    citalopram (CELEXA) 20 MG tablet Take 1 tablet (20 mg total) by mouth once daily. 30 tablet 2    dapsone 100 MG Tab Take 1 tablet (100 mg total) by mouth once daily. 30 tablet 3    deferasirox (EXJADE) 500 MG disintegrating tablet Take 3 tablets (1,500 mg total) by mouth before breakfast. 90 tablet 0    evolocumab (REPATHA SURECLICK) 140 mg/mL PnIj Inject 140mg (1 pen) into the skin every 2 weeks. (Patient taking differently: Inject 140mg (1 pen) into the skin every 2 weeks.) 6 mL 3    flash glucose scanning reader (FREESTYLE MARIANA 14 DAY READER) Misc Use 4 or more times per day to check blood sugar 1 each 0    flash glucose sensor (FREESTYLE MARIANA 14 DAY SENSOR) Kit Use qid to check blood sugar 2 kit 11    fluconazole (DIFLUCAN) 200 MG Tab Take 2 tablets (400 mg total) by mouth once daily. 60 tablet 6    folic acid (FOLVITE) 1 MG tablet Take 1 tablet (1 mg  total) by mouth once daily. 360 tablet 0    gabapentin (NEURONTIN) 100 MG capsule Take 1 capsule (100 mg total) by mouth 2 (two) times daily. 60 capsule 11    ketotifen (ZADITOR) 0.025 % (0.035 %) ophthalmic solution Place 2-3 drops into both eyes daily as needed (Allergies).      lactose-reduced food (ENSURE ORAL) Take by mouth daily as needed (supplement).      lancets 30 gauge Misc Use to test blood sugar daily (Patient taking differently: Use to test blood sugar daily) 100 each 3    lisinopriL (PRINIVIL,ZESTRIL) 20 MG tablet Take 1 tablet (20 mg total) by mouth once daily. 90 tablet 3    loperamide (IMODIUM) 2 mg capsule Take 4 mg by mouth daily as needed for Diarrhea.      loratadine (CLARITIN) 10 mg tablet Take 10 mg by mouth daily as needed.       melatonin (MELATIN) 5 mg Take 10 mg by mouth every evening.      mirtazapine (REMERON) 7.5 MG Tab Take 1 tablet (7.5 mg total) by mouth every evening. 30 tablet 11    MULTIVITAMIN ORAL Take 1 tablet by mouth once daily.      NIFEdipine (PROCARDIA-XL) 60 MG (OSM) 24 hr tablet Take 1 tablet (60 mg total) by mouth once daily. 30 tablet 11    ondansetron (ZOFRAN-ODT) 8 MG TbDL Dissolve 1 tablet (8 mg total) by mouth every 8 (eight) hours as needed. 30 tablet 3    pantoprazole (PROTONIX) 40 MG tablet Take 1 tablet (40 mg total) by mouth once daily. 30 tablet 3    polyethylene glycol (MIRALAX) 17 gram PwPk Take 17 g by mouth daily as needed (Constipation).      potassium chloride SA (K-DUR,KLOR-CON) 20 MEQ tablet Take 1 tablet (20 mEq total) by mouth once daily. 30 tablet 4    predniSONE (DELTASONE) 10 MG tablet Take 6 tabs by mouth once daily for 8 days, THEN 5 tabs once daily for 14 days, 4 tabs daily for 14 days, 3 tabs daily for 14 days, 2 tabs daily for 14 days, 1 tab once daily for 14 days, 0.5 tab once daily for 7 days. 262 tablet 0    [DISCONTINUED] lisinopriL-hydrochlorothiazide (PRINZIDE,ZESTORETIC) 20-12.5 mg per tablet Take 2 tablets by mouth  once daily. 180 tablet 3     No current facility-administered medications on file prior to visit.        Review of patient's allergies indicates:   Allergen Reactions    Revlimid [lenalidomide] Swelling     Facial swelling  6/8/17 - in the process of desensitation       OBJECTIVE:     Vital Signs:  There were no vitals taken for this visit.  Wt Readings from Last 1 Encounters:   06/14/22 1611 69.7 kg (153 lb 10.6 oz)   06/14/22 0933 69.3 kg (152 lb 12.5 oz)     There is no height or weight on file to calculate BMI.        Physical Exam:  There were no vitals taken for this visit.  General appearance: alert, cooperative, no distress  Constitutional:Oriented to person, place, and time  + appears well-developed and well-nourished.   HEENT: Normocephalic, atraumatic, neck symmetrical, no nasal discharge   Eyes: conjunctivae/corneas clear, PERRL, EOM's intact  Lungs: clear to auscultation bilaterally, no dullness to percussion bilaterally  Heart: regular rate and rhythm without rub; no displacement of the PMI   Abdomen: soft, non-tender; bowel sounds normoactive; no organomegaly  Extremities: extremities symmetric; no clubbing, cyanosis, or edema  Integument: Skin color, texture, turgor normal; no rashes; hair distrubution normal  Neurologic: Alert and oriented X 3, normal strength, normal coordination and gait  Psychiatric: no pressured speech; normal affect; no evidence of impaired cognition     Laboratory  Lab Results   Component Value Date    WBC 7.55 06/16/2022    HGB 7.3 (L) 06/16/2022    HCT 23.5 (L) 06/16/2022    MCV 93 06/16/2022     (L) 06/16/2022     BMP  Lab Results   Component Value Date     06/14/2022    K 4.2 06/14/2022     06/14/2022    CO2 26 06/14/2022    BUN 31 (H) 06/14/2022    CREATININE 0.8 06/14/2022    CALCIUM 9.0 06/14/2022    ANIONGAP 9 06/14/2022    ESTGFRAFRICA >60.0 06/14/2022    EGFRNONAA >60.0 06/14/2022     Lab Results   Component Value Date    ALT 42 06/14/2022    AST  18 06/14/2022    ALKPHOS 49 (L) 06/14/2022    BILITOT 0.7 06/14/2022     Lab Results   Component Value Date    INR 1.1 02/14/2022    INR 1.0 11/08/2021    INR 1.1 02/19/2020     Lab Results   Component Value Date    HGBA1C 5.8 (H) 05/20/2022       Diagnostic Results:        TRANSITION OF CARE:         ASSESSMENT & PLAN:     HIGH RISK CONDITION(S):  MDS (myelodysplastic syndrome) with 5q deletion  Due for cycle 40 of azacitidine on 5/2/22. Delayed due to multiple PNA admissions, needs for SNF.   Nursing home will transplant patient for future treatment, scheduled Vidaza to restart on 7/11     Anemia requiring transfusions  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   Admission Hgb: 4.9  Baseline Hgb: 6.5-7.5  2 units of blood ordered in ED, blood bank shortage. Patient received 2 units PRBC. hgb improved to 7.5 this am.  - Daily CBC        Physical debility  PT/OT eval for placement  SNF PT recommended home with  PT/OT,   PT/OT reassment this am recommending SNF  Patient already has been accepted to nursing home, discharge in am     Controlled type 2 diabetes mellitus without complication, without long-term current use of insulin  Home regimen: none  Last A1c: 5.8% on 5/20/22  - Diabetic diet  - Glucose 112 on admission on prednisone daily  - LDSSI for now. No scheduled     Long term (current) use of systemic steroids  Patient with hypoxemic respiratory failure thought to be inflammatory on prior admission after non-infectious bronchoscopy. Pulm sent her home with 60mg PO qd for 2 weeks and a taper by 10mg daily every 2 weeks.   - Continue Prednisone 40mg PO qd for now     Prednisone Schedule (based on pulm recs last admission):  5/12-5/26: 60mg qd  5/27-6/9: 50mg qd  6/10-6/23: 40mg qd  6/24-7/7: 30mg qd  7/8-7/21: 20mg qd  7/22-8/4: 10mg qd  8/5-8/11: 5mg  qd     Essential hypertension  Continue home coreg 25mg BID, chlorthalidone 25mg qd, lisinopril 20mg qd, nifedipine 60mg qd     CAD (coronary artery disease)  Continue ASA, BB  No chest pain  Instructions for the patient:      Scheduled Follow-up :  Future Appointments   Date Time Provider Department Center   6/27/2022 11:00 AM Claudia Rapp MD [facility] IM [address] PCW   7/11/2022  8:00 AM [facility] LAB [facility]   7/11/2022  9:00 AM Bravo Nevarez MD [facility] HC BMT [facility]   7/11/2022  9:30 AM INJECTION, [facility]   7/12/2022  9:30 AM [facility]JE[facility]   7/13/2022  9:30 AM INJECTION[facility]   7/14/2022  9:30 AM INJECTION[facility]   7/15/2022  9:30 AM INJECTION, [facility]   7/15/2022 11:00 AM Nando Marie, PhD [facility] CAN PSY [facility]       Post Visit Medication List:     Medication List          Accurate as of June 21, 2022 11:10 AM. If you have any questions, ask your nurse or doctor.            CONTINUE taking these medications    acyclovir 400 MG tablet  Commonly known as: ZOVIRAX  Take 1 tablet (400 mg total) by mouth 2 (two) times daily.     allopurinoL 100 MG tablet  Commonly known as: ZYLOPRIM  Take 1 tablet (100 mg total) by mouth once daily.     aspirin 81 MG EC tablet  Commonly known as: ECOTRIN  Take 1 tablet (81 mg total) by mouth once daily.     carvediloL 25 MG tablet  Commonly known as: COREG  Take 1 tablet (25 mg total) by mouth 2 (two) times daily with meals.     chlorthalidone 25 MG Tab  Commonly known as: HYGROTEN  Take 1 tablet (25 mg total) by mouth once daily.     ciprofloxacin HCl 500 MG tablet  Commonly known as: CIPRO  Take 1 tablet (500 mg total) by mouth every 12 (twelve) hours.     citalopram 20 MG tablet  Commonly known as: CeleXA  Take 1 tablet (20 mg total) by mouth once daily.     dapsone 100 MG Tab  Take  1 tablet (100 mg total) by mouth once daily.     deferasirox 500 MG disintegrating tablet  Commonly known as: EXJADE  Take 3 tablets (1,500 mg total) by mouth before breakfast.     ENSURE ORAL     fluconazole 200 MG Tab  Commonly known as: DIFLUCAN  Take 2 tablets (400 mg total) by mouth once daily.     folic acid 1 MG tablet  Commonly known as: FOLVITE  Take 1 tablet (1 mg total) by mouth once daily.     FREESTYLE MARIANA 14 DAY READER Misc  Generic drug: flash glucose scanning reader  Use 4 or more times per day to check blood sugar     FREESTYLE MRAIANA 14 DAY SENSOR Kit  Generic drug: flash glucose sensor  Use qid to check blood sugar     gabapentin 100 MG capsule  Commonly known as: NEURONTIN  Take 1 capsule (100 mg total) by mouth 2 (two) times daily.     ketotifen 0.025 % (0.035 %) ophthalmic solution  Commonly known as: ZADITOR     lisinopriL 20 MG tablet  Commonly known as: PRINIVIL,ZESTRIL  Take 1 tablet (20 mg total) by mouth once daily.     loperamide 2 mg capsule  Commonly known as: IMODIUM     loratadine 10 mg tablet  Commonly known as: CLARITIN     melatonin 5 mg  Commonly known as: MELATIN     MIRALAX 17 gram Pwpk  Generic drug: polyethylene glycol     mirtazapine 7.5 MG Tab  Commonly known as: REMERON  Take 1 tablet (7.5 mg total) by mouth every evening.     MULTIVITAMIN ORAL     NIFEdipine 60 MG (OSM) 24 hr tablet  Commonly known as: PROCARDIA-XL  Take 1 tablet (60 mg total) by mouth once daily.     ondansetron 8 MG Tbdl  Commonly known as: ZOFRAN-ODT  Dissolve 1 tablet (8 mg total) by mouth every 8 (eight) hours as needed.     pantoprazole 40 MG tablet  Commonly known as: PROTONIX  Take 1 tablet (40 mg total) by mouth once daily.     potassium chloride SA 20 MEQ tablet  Commonly known as: K-DUR,KLOR-CON  Take 1 tablet (20 mEq total) by mouth once daily.     predniSONE 10 MG tablet  Commonly known as: DELTASONE  Take 6 tabs by mouth once daily for 8 days, THEN 5 tabs once daily for 14 days, 4 tabs  daily for 14 days, 3 tabs daily for 14 days, 2 tabs daily for 14 days, 1 tab once daily for 14 days, 0.5 tab once daily for 7 days.  Start taking on: May 19, 2022     REPATHA SURECLICK 140 mg/mL Pnij  Generic drug: evolocumab  Inject 140mg (1 pen) into the skin every 2 weeks.     TRUEPLUS LANCETS 30 gauge Misc  Generic drug: lancets  Use to test blood sugar daily     VIDAZA INJ            Signing Physician:  Santo Ma MD

## 2022-06-22 NOTE — ED NOTES
Pt resting at this time, eyes closed, visible rise and fall of chest, respirations clear and unlabored. Pt continues to wait for EMS at this time.

## 2022-06-22 NOTE — ED NOTES
[facility] here for pickup of pt back to nursing home. Pt is A&Ox4, respirations clear and unlabored, in no apparent distress. Pt able to ambulate to wheelchair. Pt has belongings and paperwork.

## 2022-06-22 NOTE — ED NOTES
This RN taking over for ALIE Landrum. At this time, pt is A&Ox4, respirations clear and unlabored, pt in no apparent distress. Pt introduced to this RN and updated on POC, aware she is waiting for a ride back home via EMS. EMS ETA is 2200.

## 2022-06-27 PROBLEM — J43.2 CENTRILOBULAR EMPHYSEMA: Status: ACTIVE | Noted: 2022-01-01

## 2022-06-27 NOTE — TELEPHONE ENCOUNTER
Can you please call [facility] and verify patient's current medication list?   If there are discrepancies please list. Thanks!

## 2022-06-27 NOTE — PROGRESS NOTES
"Subjective:       Patient ID: Susan Puente is a 71 y.o. female.    Chief Complaint: Annual Exam    HPI   Susan Puente is a 71 y.o. female here for routine follow up of the following chronic issues:    She is residing at [facility].  Unsure which medications she is currently taking as they adminster them to her.     MDS  Currently treated with Vidaza and requires intermittent transfusions.     DM2  Currently in preDM range with metformin only (not 100% sure of medications, will call to confirm.)   a1c 5.8% in May 2022. Highest 6.5% in 2020   Requests a freestyle shan meter due to hx of hypoglycemia.   She has had multiple episodes of hypoglycemia - has caused falls at home previously.   This morning . Range 261, 114, 220, 166.    HTN  Carvedilol 25mg bid  Chlorthalidone 25mg daily  lisinopril 20mg  Nifedipine xl 60mg daily    HLD  repatha    She was admitted with pneumonia in April. Then readmitted. She was then discharged to SNF but back to forth in hospital for transfusions.  Now in nursing home and feels they are taking good care of her.   Working with PT and walking with rollator.     She is currently on a prednisone taper.    She is in fall risk reduction program.     Review of Systems   Constitutional: Negative for fever.   Respiratory: Negative for shortness of breath.    Cardiovascular: Negative for chest pain.   Musculoskeletal: Negative.    Skin: Negative.        Objective:   BP (!) 112/42 (BP Location: Left arm, Patient Position: Sitting, BP Method: Medium (Manual))   Pulse (!) 59   Ht 5' 5" (1.651 m)   Wt 70.5 kg (155 lb 8.6 oz)   SpO2 98%   BMI 25.88 kg/m²      Physical Exam  Vitals reviewed.   Constitutional:       Appearance: She is well-developed.   HENT:      Head: Normocephalic and atraumatic.   Eyes:      Conjunctiva/sclera: Conjunctivae normal.      Pupils: Pupils are equal, round, and reactive to light.   Neck:      Thyroid: No thyromegaly. "   Cardiovascular:      Rate and Rhythm: Normal rate and regular rhythm.      Heart sounds: Normal heart sounds. No murmur heard.  Pulmonary:      Effort: Pulmonary effort is normal. No respiratory distress.      Breath sounds: Normal breath sounds. No wheezing.   Abdominal:      General: There is no distension.      Palpations: Abdomen is soft.      Tenderness: There is no abdominal tenderness. There is no rebound.   Musculoskeletal:         General: No swelling or deformity. Normal range of motion.      Cervical back: Neck supple.   Lymphadenopathy:      Cervical: No cervical adenopathy.   Skin:     General: Skin is warm and dry.      Findings: No rash.   Neurological:      Mental Status: She is alert and oriented to person, place, and time.   Psychiatric:         Thought Content: Thought content normal.         Judgment: Judgment normal.       Left foot drop brace in place.  Assessment:       1. Controlled type 2 diabetes mellitus with stage 2 chronic kidney disease, without long-term current use of insulin    2. Centrilobular emphysema    3. Atherosclerosis of aorta    4. Multiple episodes of hypoglycemia    5. Coronary artery disease involving native coronary artery of native heart without angina pectoris    6. Essential hypertension    7. Colon cancer screening    8. Encounter for screening mammogram for breast cancer    9. Controlled type 2 diabetes mellitus without complication, without long-term current use of insulin        Plan:       Susan was seen today for annual exam.    Diagnoses and all orders for this visit:    Controlled type 2 diabetes mellitus with stage 2 chronic kidney disease, without long-term current use of insulin  -     Microalbumin/Creatinine Ratio, Urine  -     blood-glucose meter kit; To check BG 1 times daily, to use with insurance preferred meter; freestyle preferred  -     lancets Misc; To check BG 1 times daily, to use with insurance preferred meter; freestyle preferred  -     blood  sugar diagnostic Strp; To check BG 1 times daily, to use with insurance preferred meter; freestyle preferred  -     metFORMIN (GLUCOPHAGE-XR) 500 MG ER 24hr tablet; Take 1 tablet (500 mg total) by mouth daily with breakfast.    Centrilobular emphysema    Atherosclerosis of aorta  -     Lipid Panel; Future    Multiple episodes of hypoglycemia    Coronary artery disease involving native coronary artery of native heart without angina pectoris  -     Lipid Panel; Future    Essential hypertension    Colon cancer screening  -     Case Request Endoscopy: COLONOSCOPY    Encounter for screening mammogram for breast cancer  -     Mammo Digital Screening Bilat w/ Abhijit; Future          Lipid with next labs    Recommend regular blood sugar testing due to hx of hypoglycemia.   Freestyle requested; other brands have not been accurate.   (shan not necessary)    Has been hesistant re: COVID vaccine due to her MDS.    [facility]

## 2022-06-28 PROBLEM — R53.1 LEFT-SIDED WEAKNESS: Status: ACTIVE | Noted: 2022-01-01

## 2022-06-28 PROBLEM — Z86.2 HISTORY OF THROMBOCYTOPENIA: Status: ACTIVE | Noted: 2022-01-01

## 2022-06-28 PROBLEM — I65.21 OCCLUSION OF RIGHT CAROTID ARTERY: Status: ACTIVE | Noted: 2022-01-01

## 2022-06-28 PROBLEM — I95.9 HYPOTENSION: Status: ACTIVE | Noted: 2022-01-01

## 2022-06-28 PROBLEM — R47.01 APHASIA: Status: ACTIVE | Noted: 2022-01-01

## 2022-06-28 PROBLEM — R06.03 RESPIRATORY DISTRESS: Status: ACTIVE | Noted: 2022-01-01

## 2022-06-28 NOTE — ASSESSMENT & PLAN NOTE
Patient's H/H not available at the time of exam   Plt count of 91 on 6/21/22   PLT count for current ED visit pending

## 2022-06-28 NOTE — ASSESSMENT & PLAN NOTE
Patient is a 71 year old female with PMH of DM, leukemia (chemotherapy), HLD, CAD, and carotid stenosis (s/p L CEA). She was found by her nurse following a syncopal episode while using the restroom. Patient was brought in to ED with L hemiplegia and aphasia. SBP on arrival was 74/44. Exam consistent with dense L hemiplegia (no movement with pain), R gaze, expressive aphasia/mute, sensation deficit on L side, and neglect on L. Initial NIHSS 22. Patient was taken for STAT CTA MP which revealed a R ICA occlusion with distal flow intracranially. Chart reviewed and notable for 80-99% R ICA  stenosis. Unclear if this is acute or chronic occlusion exacerbated by hypotension/hypoperfusion. No H/H available at the time of imaging interpretation; however, history of thrombocytopenia (most recent on 6/21/22 was PLT 91). Decision made to not give tpa based on thrombocytopenia and unstable BP. Patient has continued to decline and exam worsened during the course of evaluation. Developed respiratory distress requiring supplemental oxygen and continued to be hypotensive despite IV fluids. Not a thrombectomy candidate. Patient with DNR/DNI. Would recommend follow up MRI, but patient too unstable to go for scan at this time.     Recommend ICU care and continued ED care for now. Patient's family and POA notified by ED team.

## 2022-06-28 NOTE — ED PROVIDER NOTES
Encounter Date: 6/28/2022       History     Chief Complaint   Patient presents with    Altered Mental Status     71 y.o. female with MDS, CAD, DM, GERD, depression, anxiety  presents for altered mental status and left-sided weakness.  Patient was witnessed to go to the bathroom on her own when she suddenly lost consciousness, followed by being confused/unable to verbalize.  Patient also had reported left-sided weakness per EMS, and there was concern for stroke.  Patient was hypotensive with EMS and received IV fluids with some improvement.  Patient was last known normal approximately 30 minutes prior to arrival.  Patient is unable to give history    The history is provided by the EMS personnel. The history is limited by the condition of the patient.     Review of patient's allergies indicates:   Allergen Reactions    Revlimid [lenalidomide] Swelling     Facial swelling  6/8/17 - in the process of desensitation     Past Medical History:   Diagnosis Date    Allergic rhinitis     Allergy     Anemia     Anxiety     CAD (coronary artery disease)     Carotid stenosis     Colon polyps 2016    Controlled type 2 diabetes mellitus without complication, without long-term current use of insulin 10/19/2016    Depression     GERD (gastroesophageal reflux disease)     Hearing loss in right ear     Hx of psychiatric care     Celexa, Prozac    Hypokalemia 2/3/2020    MDS (myelodysplastic syndrome) with 5q deletion     Psychiatric problem     Therapy      Past Surgical History:   Procedure Laterality Date    BONE MARROW BIOPSY Left 6/7/2018    Procedure: Biopsy-bone marrow;  Surgeon: Gita Valdes MD;  Location: [facility] OR [address] [place] [address];  Service: Oncology;  Laterality: Left;    BONE MARROW BIOPSY Left 3/21/2019    Procedure: Biopsy-bone marrow;  Surgeon: Gita Valdes MD;  Location: [facility] OR [address] [place] [address];  Service: Oncology;  Laterality: Left;    BONE MARROW BIOPSY Left 10/24/2019    Procedure: Biopsy-bone marrow;  Surgeon: Gita  MD Lucio;  Location: [facility] OR [address] [place] [address];  Service: Oncology;  Laterality: Left;  left iliac crest    BONE MARROW BIOPSY Left 2021    Procedure: Biopsy-bone marrow;  Surgeon: Gita Valdes MD;  Location: [facility] ENDO (4TH FLR);  Service: Oncology;  Laterality: Left;    BUNIONECTOMY      CAROTID ENDARTERECTOMY Left     CAROTID STENT      COLONOSCOPY N/A 2016    Procedure: COLONOSCOPY;  Surgeon: PATRICIA Simpson MD;  Location: [facility] ENDO (4TH FLR);  Service: Endoscopy;  Laterality: N/A;  pt has vomiting with anesthesia in past    ENDOMETRIAL ABLATION      for endometriosis    INSERTION OF TUNNELED CENTRAL VENOUS CATHETER (CVC) WITH SUBCUTANEOUS PORT N/A 2020    Procedure: [id number];  Surgeon: Dosc Diagnostic Provider;  Location: [facility] OR [address] [place] [address];  Service: Radiology;  Laterality: N/A;  189    TONSILLECTOMY      TYMPANOSTOMY TUBE PLACEMENT       Family History   Problem Relation Age of Onset    Heart disease Mother     Hyperlipidemia Mother     Heart disease Father     Alcohol abuse Father     Cancer Paternal Grandmother         smoker; lung?    Alcohol abuse Brother         recovering    Colon cancer Neg Hx     Breast cancer Neg Hx      Social History     Tobacco Use    Smoking status: Former Smoker     Packs/day: 1.50     Years: 50.00     Pack years: 75.00     Types: Cigarettes, Vaping with nicotine     Quit date: 3/3/2017     Years since quittin.3    Smokeless tobacco: Never Used   Substance Use Topics    Alcohol use: No    Drug use: No     Review of Systems   Unable to perform ROS: Patient unresponsive       Physical Exam     Initial Vitals   BP Pulse Resp Temp SpO2   22 0832 22 0832 22 0832 22 1040 22 0832   (!) 74/44 (!) 58 14 (!) 89.4 °F (31.9 °C) 95 %      MAP       --                Physical Exam    Nursing note and vitals reviewed.  Constitutional:   Ill-appearing, pale, nonverbal   HENT:   Head: Normocephalic and atraumatic.    Oropharynx dry   Eyes:   Pale conjunctiva   Cardiovascular: Normal rate, regular rhythm, normal heart sounds and intact distal pulses.   Pulmonary/Chest: Breath sounds normal. No stridor. No respiratory distress.   Abdominal: Abdomen is soft. She exhibits no distension. There is no abdominal tenderness.   Musculoskeletal:         General: No tenderness or edema.     Neurological:   LOC:  Somnolent  Language: No aphasia  Articulation: No dysarthria  Orientation:  Unable to assess  Visual Fields: Intact  EOM (CN III, IV, VI): Full/intact  Pupils (CN II, III): PERRL  Facial Sensation (CN V): Normal  Facial Movement (CN VII): Symmetric facial expression    Motor: Arm left flaccid  Leg left flaccid  Arm right  5/5  Leg right 5/5  Sensation intact to right side, sensation     Skin: Skin is warm and dry. No rash noted.   Psychiatric: She has a normal mood and affect. Thought content normal.         ED Course   Procedures  Labs Reviewed   CBC W/ AUTO DIFFERENTIAL - Abnormal; Notable for the following components:       Result Value    WBC 18.23 (*)     RBC 1.91 (*)     Hemoglobin 5.5 (*)     Hematocrit 17.9 (*)     MCHC 30.7 (*)     RDW 16.9 (*)     Gran % 83.0 (*)     Lymph % 9.0 (*)     Mono % 2.0 (*)     All other components within normal limits    Narrative:     H&H critical result(s) called and verbal readback obtained from Filomena Mortensen RN by JC8 06/28/2022 10:01   COMPREHENSIVE METABOLIC PANEL - Abnormal; Notable for the following components:    Potassium 5.9 (*)     CO2 17 (*)     Glucose 395 (*)     BUN 58 (*)     Creatinine 1.7 (*)     Total Protein 5.4 (*)     Albumin 2.9 (*)     ALT 60 (*)     eGFR if  34.5 (*)     eGFR if non  29.9 (*)     All other components within normal limits   LIPID PANEL - Abnormal; Notable for the following components:    Triglycerides 212 (*)     HDL 25 (*)     LDL Cholesterol 57.6 (*)     All other components within normal limits   LACTIC ACID,  PLASMA - Abnormal; Notable for the following components:    Lactate (Lactic Acid) 10.7 (*)     All other components within normal limits   HEMOGLOBIN A1C - Abnormal; Notable for the following components:    Hemoglobin A1C 6.0 (*)     All other components within normal limits    Narrative:     ADD ON [facility] [phone]  PER NELLY SANDERSON NP.  06/28/2022  11:06    ISTAT PROCEDURE - Abnormal; Notable for the following components:    POC Glucose 382 (*)     POC BUN 61 (*)     POC Creatinine 1.7 (*)     POC Potassium 5.9 (*)     POC TCO2 (MEASURED) 17 (*)     POC Hematocrit 21 (*)     All other components within normal limits   ISTAT CREATININE - Abnormal; Notable for the following components:    POC Creatinine 1.5 (*)     All other components within normal limits   ISTAT PROCEDURE - Abnormal; Notable for the following components:    POC PTWBT 14.6 (*)     All other components within normal limits   CULTURE, BLOOD   CULTURE, BLOOD   PROTIME-INR   TSH   MAGNESIUM   TROPONIN I   HEMOGLOBIN A1C   SARS-COV-2 RDRP GENE    Narrative:     This test utilizes isothermal nucleic acid amplification   technology to detect the SARS-CoV-2 RdRp nucleic acid segment.   The analytical sensitivity (limit of detection) is 125 genome   equivalents/mL.   A POSITIVE result implies infection with the SARS-CoV-2 virus;   the patient is presumed to be contagious.     A NEGATIVE result means that SARS-CoV-2 nucleic acids are not   present above the limit of detection. A NEGATIVE result should be   treated as presumptive. It does not rule out the possibility of   COVID-19 and should not be the sole basis for treatment decisions.   If COVID-19 is strongly suspected based on clinical and exposure   history, re-testing using an alternate molecular assay should be   considered.   This test is only for use under the Food and Drug   Administration s Emergency Use Authorization (EUA).   Commercial kits are provided by [employer].   Performance  characteristics of the EUA have been independently   verified by [facility] Department of   Pathology and Laboratory Medicine.   _________________________________________________________________   The authorized Fact Sheet for Healthcare Providers and the authorized Fact   Sheet for Patients of the ID NOW COVID-19 are available on the FDA   website:     https://www.fda.gov/media/283312/download  https://www.fda.gov/media/247399/download           TYPE & SCREEN   PREPARE RBC SOFT        ECG Results          ECG 12 lead (Final result)  Result time 06/28/22 11:09:45    Final result by Interface, Lab In [facility] (06/28/22 11:09:45)                 Narrative:    Test Reason : I63.9,    Vent. Rate : 065 BPM     Atrial Rate : 104 BPM     P-R Int : 000 ms          QRS Dur : 086 ms      QT Int : 406 ms       P-R-T Axes : 000 065 058 degrees     QTc Int : 422 ms    Junctional rhythm  Nonspecific ST abnormality  Abnormal ECG  When compared with ECG of 28-JUN-2022 09:26,  Vent. rate has increased BY  21 BPM  Confirmed by JOSSELYN ASKEW MD ([id number]) on 6/28/2022 11:09:31 AM    Referred By: System System           Confirmed By:JOSSELYN ASKEW MD                             EKG, 12 - Lead (Final result)  Result time 06/28/22 11:09:29    Final result by Interface, Lab In [facility] (06/28/22 11:09:29)                 Narrative:    Test Reason : I63.9,    Vent. Rate : 044 BPM     Atrial Rate : 039 BPM     P-R Int : 000 ms          QRS Dur : 090 ms      QT Int : 494 ms       P-R-T Axes : 000 082 067 degrees     QTc Int : 422 ms    Junctional bradycardia  Cannot rule out Anterior infarct ,age undetermined  Abnormal ECG  When compared with ECG of 14-JUN-2022 09:49,  Junctional rhythm has replaced Sinus rhythm  Vent. rate has decreased BY  37 BPM  Questionable change in The axis  Nonspecific T wave abnormality no longer evident in Inferior leads  Nonspecific T wave abnormality no longer evident in Anterior-lateral  leads  Confirmed by JOSSELYN ASKEW MD ([id number]) on 6/28/2022 11:09:16 AM    Referred By: System System           Confirmed By:JOSSELYN ASKEW MD                            Imaging Results          X-Ray Chest AP Single View (Final result)  Result time 06/28/22 10:55:46    Final result by Matthew Bullard MD (06/28/22 10:55:46)                 Impression:      See above      Electronically signed by: Matthew Bullard MD  Date:    06/28/2022  Time:    10:55             Narrative:    EXAMINATION:  XR CHEST 1 VIEW    CLINICAL HISTORY:  cva;    TECHNIQUE:  Single frontal view of the chest was performed.    COMPARISON:  05/27/2020    FINDINGS:  Vascular catheter seen with its tip in the superior vena cava.  Heart size is at the upper limits of normal.  Patient has developed patchy interstitial change and possibly superimposed alveolar infiltrate in both lung fields worse on the right.                                CTA Chest Abdomen Pelvis (Final result)  Result time 06/28/22 11:06:35   Procedure changed from CT Chest Abdomen Pelvis With Contrast (xpd)     Final result by Madison Moyer MD (06/28/22 11:06:35)                 Impression:      Significant atherosclerosis of the vasculature.  No aortic aneurysm, dissection, or hemodynamically significant stenosis.    Suspected significant narrowing of the left common iliac artery origin not well assessed due to motion artifact.    Intraluminal fluid opacification of the large bowel which can be seen with diarrheal illness.    Gas foci within the right atrium and central veins can be associated with IV catheter/injection.    Resolution of bilateral pleural fluid and significant improvement of patchy pulmonary opacification.    Stable right hepatic hypodensity.    Stable nonspecific thickening of the left adrenal gland.    This report was flagged in Epic as abnormal.    Electronically signed by resident: Matti  Guerra  Date:    06/28/2022  Time:    09:34    Electronically signed by: Madison Moyer MD  Date:    06/28/2022  Time:    11:06             Narrative:    EXAMINATION:  CTA CHEST ABDOMEN PELVIS    CLINICAL HISTORY:  Aortic aneurysm, known or suspected;    TECHNIQUE:  Axial CT angiogram images of the  chest, abdomen, and pelvis were obtained before and after the administration of 75 cc of  Omnipaque 350 IV, from the lung apices through the ischial tuberosities.  Coronal and sagittal reconstructions of the abdominal aorta and visceral arteries performed.    COMPARISON:  CT chest 05/08/2022; PET-CT 10/07/2021    FINDINGS:  CHEST:    Devices: Right chest port with catheter terminating in the right atrium.    Soft tissue: Structures of the base of the neck demonstrate no significant abnormality.    Esophagus: Normal in course and caliber.    Lymph nodes: There is no axillary, mediastinal, or hilar lymphadenopathy.    Airways/lungs: The trachea is midline, the proximal airways are patent and the lungs are symmetrically expanded.  Resolution of interlobular septal thickening and bilateral pleural fluid with significant improvement of bilateral patchy opacities.  Mild residual patchy opacification and nodular opacities, significant breathing motion artifacts.  No acute focal airspace disease.    Heart:  Normal in size with no pericardial effusion.  Multivessel coronary calcific atherosclerosis and right coronary artery stent placement.  Mild gas foci within the right atrium.    ABDOMEN/PELVIS:    Liver: Normal in size and attenuation.  Stable right hepatic 1.1 cm hypodensity (4:398).    Gallbladder/biliary system: The gallbladder is unremarkable.  There is no intra-or extrahepatic biliary ductal dilatation.    Pancreas: No peripancreatic fat stranding or pancreatic mass.    Spleen: Unremarkable.    Adrenals: Continued nonspecific thickening of the left adrenal gland.  Right adrenal is  unremarkable.    Kidneys/ureters: Kidneys are normal in location.  Atrophic left kidney.  Mild lobularity of the right kidney or scarring.  Bilateral renal hypodensities too small to characterize (4:534, 546).  No nephrolithiasis or hydronephrosis.  Ureters are normal in course and caliber without ureteral dilatation.  Urinary bladder is decompressed limiting visualization.    Mesentery/GI: Small hiatal hernia.  No small or large bowel obstruction or focal inflammation.  Intraluminal fluid attenuation of the large bowel.  There is no ascites, free fluid, or intraperitoneal free air. There is no evidence of lymph node enlargement in the abdomen or pelvis.    Retroperitoneal:  Unremarkable.    Reproductive: Unremarkable.    Abdominal wall:  Unremarkable.    Bone: Moderate degenerative changes of the osseous structures.  Advanced degenerative changes of the hips with joint space narrowing.  No acute fracture.    VASCULAR:    Mild infrarenal aorta ectasia measuring 2.3 cm (602 image 106).  There is no evidence of aortic dissection or stenosis.  There is extensive atherosclerotic change of the thoracic and abdominal aorta.  Calcific atherosclerosis of the common iliac arteries near the origins with suspected significant narrowing of the left common iliac artery origin not well assessed due to motion artifact.    The pulmonary arteries distribute normally. There is no evidence of intraluminal filling defect the level of the proximal segmental arteries bilaterally to suggest pulmonary thromboembolism.    Mild gas foci within the central veins particularly in the brachiocephalic vein more than typical volume possibly associated with IV injection/catheter.                                CTA STROKE MULTI-PHASE (Final result)  Result time 06/28/22 09:42:07    Final result by Bartolo Fernandes MD (06/28/22 09:42:07)                 Impression:      1. No definite acute intracranial findings.  MRI would provide more sensitive  and specific assessment.  2. CTA demonstrates essentially complete lack of contrast opacification of the majority of the right common carotid and cervical internal carotid arteries, with some reconstitution of the distal cervical right ICA near the skull base.  Some degree of reconstitution of the intracranial ICA suggested, which could indicate chronic proximal carotid steno-occlusive disease, however correlation with any prior CT imaging would be of benefit to establish chronicity.  3. Linear somewhat shelf-like filling defect within the posteromedial aspect of the mid left common carotid artery could indicate dissection versus web.  4. No definite evidence of acute intracranial large vessel occlusion.  Multifocal intracranial atherosclerotic disease, as described.  5. Suggested partially calcified 6 mm aneurysm distal right A2 EARL branch.  6. Solid pulmonary nodules for which attention on follow-up be recommended. Please refer to separately reported same day CTA of the chest, abdomen, and pelvis for additional details of findings below the level of clavicles.  7. Additional details, as provided in the body of report.  This report was flagged in Epic as abnormal.      Electronically signed by: Bartolo Fernandes  Date:    06/28/2022  Time:    09:42             Narrative:    EXAMINATION:  CTA STROKE MULTI-PHASE    CLINICAL HISTORY:  Neuro deficit, acute, stroke suspected;    TECHNIQUE:  Non contrast low dose axial images were obtained thought the head. CT angiogram was performed from the level of the birgit to the top of the head following the IV administration of 75mL of Omnipaque 350.   Sagittal and coronal reconstructions and maximum intensity projection reconstructions were performed. Arterial stenosis percentages are based on NASCET measurement criteria.  Two additional phases of immediate post-contrast CTA images were performed through the head alone.    COMPARISON:  CT head without contrast performed  03/12/2019.    FINDINGS:  CT HEAD:    Blood: No acute intracranial hemorrhage.    Parenchyma: No definite loss of gray-white differentiation to suggest acute or subacute transcortical infarct. Generalized pattern of age-related parenchymal volume loss.  Nonspecific areas of white matter hypoattenuation may relate to sequela of chronic small vessel ischemic disease.    Ventricles/Extra-axial spaces: No abnormal extra-axial fluid collection. Basal cisterns are patent.    Vessels: Atherosclerotic calcifications.    Orbits: Unremarkable.    Scalp: Unremarkable.    Skull: There are no depressed skull fractures or destructive bone lesions.    Sinuses and mastoids: Scattered relatively modest paranasal sinus mucosal thickening retention cysts.    Other findings: None    CTA:    NECK:    Imaged aortic arch: Nonaneurysmal.  Left-sided aortic arch.  Calcified noncalcified atheromatous plaque.  Mild-to-moderate atheromatous narrowing at the origin of the left subclavian artery.    Right common and cervical internal carotid arteries: Minimal contrast opacification of the proximal common carotid artery is suggested just distal to the origin.  Elsewhere, there is essentially complete lack of opacification of the remainder of the CCA.  Faint contrast opacification of the distal cervical ICA is noted near the skull base.  Relative paucity of contrast opacification of the proximal ECA branches.    Left common and cervical internal carotid arteries: Common carotid artery appears patent with multifocal luminal irregularity related to atherosclerotic disease.  Additionally, there is a linear somewhat shelf-like filling defect within the posteromedial aspect of the mid CCA (axial series 5, image 231; sagittal reformat series 605, image 144).  Atherosclerotic disease at the proximal ICA appears to result in less than 50% stenosis.  Atherosclerotic disease involves the CCA which appears patent.    Right cervical vertebral artery: There is  no hemodynamically significant stenosis or dissectionright vertebral artery appears patent.    Left cervical vertebral artery: There is no hemodynamically significant stenosis or dissection. Small caliber, though appears proportional relative to size of the transverse foramina.    HEAD:    Right anterior circulation:Correlating with findings involving the distal cervical ICA, there is decreased caliber and degree of contrast enhancement of the intracranial ICA compared to the left.  There is multifocal mild-to-moderate atheromatous irregularity.  Ophthalmic artery is not well opacified.  Carotid terminus appears patent.  Multifocal mild to mild-to-moderate atheromatous irregularity of the M1 MCA.  No definite flow-limiting stenosis of the EARL branches.  Suggested partially calcified aneurysm involving the distal right A2 EARL branch (axial series 5, image 482; coronal reformat series 611, image 64), measuring approximately 5.2 x 5.9 x 6 mm    Left anterior circulation: Scattered multifocal atheromatous narrowing of the ICA.Left ophthalmic artery is patent.    Posterior circulation: The left vertebral artery appears to essentially terminates as PICA.  Multifocal mild to moderate atheromatous disease involves the right V4 segment.  Patent PCI a at left vertebral artery origin.  Mild atheromatous irregularity of the basilar artery.  Diminutive caliber of the basilar artery felt on developmental basis related to prominent bilateral posterior communicating arteries.  Posterior inferior cerebellar arteries patent at origin.    Anterior and posterior communicating arteries: The anterior and posterior communicating arteries are visualized.    NON-ANGIOGRAPHIC FINDINGS:    Visualized intracranial contents: As above.    Soft tissues of the neck: Unremarkable.    Visualized sinuses: As above.    Dentition: Unremarkable.    Spine: Osseous demineralization.  Degenerative changes.    Visualized lungs: Emphysematous changes are  noted.  Smooth interlobular septal thickening could indicate pulmonary vascular congestion/edema.  Numerous solid pulmonary nodules, for example a 4 mm solid nodule right upper lobe (series 5, image 114).  Continued attention follow-up be recommended.  Right upper lobe atelectasis.  Nonspecific debris is noted within the esophagus, finding which may be seen in the setting of esophageal dysmotility.  Tunnel right chest wall port catheter is noted.  Please refer to separately reported same day CTA of the chest, abdomen, and pelvis for additional details of findings below the level of clavicles.                                 Medications   sodium chloride 0.9% bolus 1,000 mL (0 mLs Intravenous Stopped 6/28/22 1010)   iohexoL (OMNIPAQUE 350) injection 75 mL (75 mLs Intravenous Given 6/28/22 0850)   alteplase (ACtivase) 100 mg injection (  Return to Cabinet 6/28/22 0900)   sodium chloride 0.9% bolus 1,000 mL (0 mLs Intravenous Stopped 6/28/22 1024)   iohexoL (OMNIPAQUE 350) injection 75 mL (75 mLs Intravenous Given 6/28/22 0917)   atropine injection 1 mg (1 mg Intravenous Given 6/28/22 0932)   vancomycin 2 g in dextrose 5 % 500 mL IVPB (0 mg Intravenous Stopped 6/28/22 1110)   morphine injection 4 mg (4 mg Intravenous Given 6/28/22 1105)   morphine injection 4 mg (4 mg Intravenous Given 6/28/22 1303)     Medical Decision Making:   History:   I obtained history from: someone other than patient and EMS provider.  Old Medical Records: I decided to obtain old medical records.  Old Records Summarized: records from previous admission(s) and records from clinic visits.  Initial Assessment:   71 y.o. female with MDS, CAD, DM, GERD, depression, anxiety  presents for altered mental status and left-sided weakness.  Patient was evaluated immediately on arrival to emergency department  Neuro deficits include aphasia, L side neglect  Stroke code was activated  Vascular neurology consulted  Patient was emergently taken for CT head to  rule out intracranial hemorrhage and evaluate for large infarcts  Presentation most consistent with acute hemorrhagic vs. Ischemic stroke  Differentials include sepsis, anemia, cardiogenic shock, aortic dissection, metabolic derangement, DKA, unstable bradycardia  Presentation concerning for shock, suspect occlusive lesion exacerbated by hypotension causing focal symptoms  Independently Interpreted Test(s):   I have ordered and independently interpreted EKG Reading(s) - see prior notes  Clinical Tests:   Lab Tests: Ordered and Reviewed  Radiological Study: Ordered and Reviewed  Medical Tests: Ordered and Reviewed  Other:   I have discussed this case with another health care provider.       <> Summary of the Discussion: Discussed with stroke team            Attending Attestation:   Physician Attestation Statement for Resident:  As the supervising MD   Physician Attestation Statement: I have personally seen and examined this patient.   I agree with the above history. -:   As the supervising MD I agree with the above PE.    As the supervising MD I agree with the above treatment, course, plan, and disposition.        Attending Critical Care:   Critical Care Times:   Direct Patient Care (initial evaluation, reassessments, and time considering the case)................................................................45 minutes.   Additional History from reviewing old medical records or taking additional history from the family, EMS, PCP, etc.......................10 minutes.   Ordering, Reviewing, and Interpreting Diagnostic Studies...............................................................................................................5 minutes.   Documentation..................................................................................................................................................................................5 minutes.   Consultation with other Physicians.  .................................................................................................................................................25 minutes.   ==============================================================  · Total Critical Care Time - exclusive of procedural time: 90 minutes.  ==============================================================  Critical care was necessary to treat or prevent imminent or life-threatening deterioration of the following conditions: nontraumatic shock and CNS failure.   The following critical care procedures were done by me (see procedure notes): arterial line placement and airway management.   Critical care was time spent personally by me on the following activities: obtaining history from patient or relative, examination of patient, review of x-rays / CT sent with the patient, review of old charts, ordering lab, x-rays, and/or EKG, development of treatment plan with patient or relative, ordering and performing treatments and interventions, evaluation of patient's response to treatment, discussions with primary provider, discussion with consultants, interpretation of cardiac measurements, re-evaluation of patient's conition and end of life discussions.   Critical Care Condition: life-threatening       Attending ED Notes:   Neurologically unstable hyperglycemic female presents via EMS reporting 30 minutes status post syncopal episode with left hemiplegia expressive aphasia and neglect warranting emergent stroke activation and CT imaging.  After immediate CTA and stroke activation, additional history of syncopal episode while on the toilet and prior to her neurological deficit led to the communication with neurovascular team a of the possibility of tPA, which at the time tPA was likely going to be given with a CT angio of brain was positive or negative.  This additional information led to CT chest abdomen and pelvis with IV contrast and understanding the 2nd load of IV  contrast to determine and confirm lack of aneurysm or dissection as the etiology to her syncopal episode and her neurological deficit.  We communicated with family and healthcare proxy, who report patient is DNR and DNI.  ____________________  Kana Randall MD, [facility]  Emergency Medicine Staff  9:02 AM 2022      STAFF ATTENDING PHYSICIAN NOTE:  I have individually/jointly evaluated Susan Puente and discussed their ED management with the resident physician. I have also reviewed their notes, assessments, and procedures documented.  I was present during all critical portions of any procedure(s) performed on Susan Puente.   ____________________  Kana Randall MD, [facility]  Emergency Medicine Staff        ED Course as of 22 0844      1126 Had discussion with patient's close friends, POA, caregivers.  Discussed goals of care, they feel like she would want to transition to comfort based care, will withdraw pressors, focus on analgesia, additional morphine dose ordered.   at bedside [OK]   1211 Discussed with social work, will seek hospice bed in case it is needed.  Patient continues to be hypotensive, patient taken off the monitor, family at bedside [OK]   1432 Patient granted hospice bed [OK]   1448 Called to patient's bedside for decreased responsiveness.  No cardiac activity on the monitor, pulseless and apneic, pupils fixed. Family at bedside. Time of death 1447 [OK]      ED Course User Index  [OK] Kumar Alves MD             Clinical Impression:   Final diagnoses:  [I63.9] Stroke  [G81.94] Left hemiparesis  [R47.01] Aphasia  [R57.9] Shock (Primary)  [R79.89] Elevated lactic acid level  [D64.9] Anemia, unspecified type          ED Disposition Condition                   Kumar Alves MD  Resident  [id number]       Buck Randall MD  22 [id number]

## 2022-06-28 NOTE — DISCHARGE INSTRUCTIONS
Imaging Results              X-Ray Chest AP Single View (Final result)  Result time 06/28/22 10:55:46      Final result by Matthew Bullard MD (06/28/22 10:55:46)                   Impression:      See above      Electronically signed by: Matthew Bullard MD  Date:    06/28/2022  Time:    10:55               Narrative:    EXAMINATION:  XR CHEST 1 VIEW    CLINICAL HISTORY:  cva;    TECHNIQUE:  Single frontal view of the chest was performed.    COMPARISON:  05/27/2020    FINDINGS:  Vascular catheter seen with its tip in the superior vena cava.  Heart size is at the upper limits of normal.  Patient has developed patchy interstitial change and possibly superimposed alveolar infiltrate in both lung fields worse on the right.                                        CTA Chest Abdomen Pelvis (Final result)  Result time 06/28/22 11:06:35   Procedure changed from CT Chest Abdomen Pelvis With Contrast (xpd)     Final result by Madison Moyer MD (06/28/22 11:06:35)                   Impression:      Significant atherosclerosis of the vasculature.  No aortic aneurysm, dissection, or hemodynamically significant stenosis.    Suspected significant narrowing of the left common iliac artery origin not well assessed due to motion artifact.    Intraluminal fluid opacification of the large bowel which can be seen with diarrheal illness.    Gas foci within the right atrium and central veins can be associated with IV catheter/injection.    Resolution of bilateral pleural fluid and significant improvement of patchy pulmonary opacification.    Stable right hepatic hypodensity.    Stable nonspecific thickening of the left adrenal gland.    This report was flagged in Epic as abnormal.    Electronically signed by resident: Matti Guerra  Date:    06/28/2022  Time:    09:34    Electronically signed by: Madison Moyer MD  Date:    06/28/2022  Time:    11:06               Narrative:    EXAMINATION:  CTA CHEST ABDOMEN  PELVIS    CLINICAL HISTORY:  Aortic aneurysm, known or suspected;    TECHNIQUE:  Axial CT angiogram images of the  chest, abdomen, and pelvis were obtained before and after the administration of 75 cc of  Omnipaque 350 IV, from the lung apices through the ischial tuberosities.  Coronal and sagittal reconstructions of the abdominal aorta and visceral arteries performed.    COMPARISON:  CT chest 05/08/2022; PET-CT 10/07/2021    FINDINGS:  CHEST:    Devices: Right chest port with catheter terminating in the right atrium.    Soft tissue: Structures of the base of the neck demonstrate no significant abnormality.    Esophagus: Normal in course and caliber.    Lymph nodes: There is no axillary, mediastinal, or hilar lymphadenopathy.    Airways/lungs: The trachea is midline, the proximal airways are patent and the lungs are symmetrically expanded.  Resolution of interlobular septal thickening and bilateral pleural fluid with significant improvement of bilateral patchy opacities.  Mild residual patchy opacification and nodular opacities, significant breathing motion artifacts.  No acute focal airspace disease.    Heart:  Normal in size with no pericardial effusion.  Multivessel coronary calcific atherosclerosis and right coronary artery stent placement.  Mild gas foci within the right atrium.    ABDOMEN/PELVIS:    Liver: Normal in size and attenuation.  Stable right hepatic 1.1 cm hypodensity (4:398).    Gallbladder/biliary system: The gallbladder is unremarkable.  There is no intra-or extrahepatic biliary ductal dilatation.    Pancreas: No peripancreatic fat stranding or pancreatic mass.    Spleen: Unremarkable.    Adrenals: Continued nonspecific thickening of the left adrenal gland.  Right adrenal is unremarkable.    Kidneys/ureters: Kidneys are normal in location.  Atrophic left kidney.  Mild lobularity of the right kidney or scarring.  Bilateral renal hypodensities too small to characterize (4:534, 546).  No  nephrolithiasis or hydronephrosis.  Ureters are normal in course and caliber without ureteral dilatation.  Urinary bladder is decompressed limiting visualization.    Mesentery/GI: Small hiatal hernia.  No small or large bowel obstruction or focal inflammation.  Intraluminal fluid attenuation of the large bowel.  There is no ascites, free fluid, or intraperitoneal free air. There is no evidence of lymph node enlargement in the abdomen or pelvis.    Retroperitoneal:  Unremarkable.    Reproductive: Unremarkable.    Abdominal wall:  Unremarkable.    Bone: Moderate degenerative changes of the osseous structures.  Advanced degenerative changes of the hips with joint space narrowing.  No acute fracture.    VASCULAR:    Mild infrarenal aorta ectasia measuring 2.3 cm (602 image 106).  There is no evidence of aortic dissection or stenosis.  There is extensive atherosclerotic change of the thoracic and abdominal aorta.  Calcific atherosclerosis of the common iliac arteries near the origins with suspected significant narrowing of the left common iliac artery origin not well assessed due to motion artifact.    The pulmonary arteries distribute normally. There is no evidence of intraluminal filling defect the level of the proximal segmental arteries bilaterally to suggest pulmonary thromboembolism.    Mild gas foci within the central veins particularly in the brachiocephalic vein more than typical volume possibly associated with IV injection/catheter.                                        CTA STROKE MULTI-PHASE (Final result)  Result time 06/28/22 09:42:07      Final result by Bartolo Fernandes MD (06/28/22 09:42:07)                   Impression:      1. No definite acute intracranial findings.  MRI would provide more sensitive and specific assessment.  2. CTA demonstrates essentially complete lack of contrast opacification of the majority of the right common carotid and cervical internal carotid arteries, with some  reconstitution of the distal cervical right ICA near the skull base.  Some degree of reconstitution of the intracranial ICA suggested, which could indicate chronic proximal carotid steno-occlusive disease, however correlation with any prior CT imaging would be of benefit to establish chronicity.  3. Linear somewhat shelf-like filling defect within the posteromedial aspect of the mid left common carotid artery could indicate dissection versus web.  4. No definite evidence of acute intracranial large vessel occlusion.  Multifocal intracranial atherosclerotic disease, as described.  5. Suggested partially calcified 6 mm aneurysm distal right A2 EARL branch.  6. Solid pulmonary nodules for which attention on follow-up be recommended. Please refer to separately reported same day CTA of the chest, abdomen, and pelvis for additional details of findings below the level of clavicles.  7. Additional details, as provided in the body of report.  This report was flagged in Epic as abnormal.      Electronically signed by: Bartolo Fernandes  Date:    06/28/2022  Time:    09:42               Narrative:    EXAMINATION:  CTA STROKE MULTI-PHASE    CLINICAL HISTORY:  Neuro deficit, acute, stroke suspected;    TECHNIQUE:  Non contrast low dose axial images were obtained thought the head. CT angiogram was performed from the level of the birgit to the top of the head following the IV administration of 75mL of Omnipaque 350.   Sagittal and coronal reconstructions and maximum intensity projection reconstructions were performed. Arterial stenosis percentages are based on NASCET measurement criteria.  Two additional phases of immediate post-contrast CTA images were performed through the head alone.    COMPARISON:  CT head without contrast performed 03/12/2019.    FINDINGS:  CT HEAD:    Blood: No acute intracranial hemorrhage.    Parenchyma: No definite loss of gray-white differentiation to suggest acute or subacute transcortical infarct.  Generalized pattern of age-related parenchymal volume loss.  Nonspecific areas of white matter hypoattenuation may relate to sequela of chronic small vessel ischemic disease.    Ventricles/Extra-axial spaces: No abnormal extra-axial fluid collection. Basal cisterns are patent.    Vessels: Atherosclerotic calcifications.    Orbits: Unremarkable.    Scalp: Unremarkable.    Skull: There are no depressed skull fractures or destructive bone lesions.    Sinuses and mastoids: Scattered relatively modest paranasal sinus mucosal thickening retention cysts.    Other findings: None    CTA:    NECK:    Imaged aortic arch: Nonaneurysmal.  Left-sided aortic arch.  Calcified noncalcified atheromatous plaque.  Mild-to-moderate atheromatous narrowing at the origin of the left subclavian artery.    Right common and cervical internal carotid arteries: Minimal contrast opacification of the proximal common carotid artery is suggested just distal to the origin.  Elsewhere, there is essentially complete lack of opacification of the remainder of the CCA.  Faint contrast opacification of the distal cervical ICA is noted near the skull base.  Relative paucity of contrast opacification of the proximal ECA branches.    Left common and cervical internal carotid arteries: Common carotid artery appears patent with multifocal luminal irregularity related to atherosclerotic disease.  Additionally, there is a linear somewhat shelf-like filling defect within the posteromedial aspect of the mid CCA (axial series 5, image 231; sagittal reformat series 605, image 144).  Atherosclerotic disease at the proximal ICA appears to result in less than 50% stenosis.  Atherosclerotic disease involves the CCA which appears patent.    Right cervical vertebral artery: There is no hemodynamically significant stenosis or dissectionright vertebral artery appears patent.    Left cervical vertebral artery: There is no hemodynamically significant stenosis or dissection.  Small caliber, though appears proportional relative to size of the transverse foramina.    HEAD:    Right anterior circulation:Correlating with findings involving the distal cervical ICA, there is decreased caliber and degree of contrast enhancement of the intracranial ICA compared to the left.  There is multifocal mild-to-moderate atheromatous irregularity.  Ophthalmic artery is not well opacified.  Carotid terminus appears patent.  Multifocal mild to mild-to-moderate atheromatous irregularity of the M1 MCA.  No definite flow-limiting stenosis of the EARL branches.  Suggested partially calcified aneurysm involving the distal right A2 EARL branch (axial series 5, image 482; coronal reformat series 611, image 64), measuring approximately 5.2 x 5.9 x 6 mm    Left anterior circulation: Scattered multifocal atheromatous narrowing of the ICA.Left ophthalmic artery is patent.    Posterior circulation: The left vertebral artery appears to essentially terminates as PICA.  Multifocal mild to moderate atheromatous disease involves the right V4 segment.  Patent PCI a at left vertebral artery origin.  Mild atheromatous irregularity of the basilar artery.  Diminutive caliber of the basilar artery felt on developmental basis related to prominent bilateral posterior communicating arteries.  Posterior inferior cerebellar arteries patent at origin.    Anterior and posterior communicating arteries: The anterior and posterior communicating arteries are visualized.    NON-ANGIOGRAPHIC FINDINGS:    Visualized intracranial contents: As above.    Soft tissues of the neck: Unremarkable.    Visualized sinuses: As above.    Dentition: Unremarkable.    Spine: Osseous demineralization.  Degenerative changes.    Visualized lungs: Emphysematous changes are noted.  Smooth interlobular septal thickening could indicate pulmonary vascular congestion/edema.  Numerous solid pulmonary nodules, for example a 4 mm solid nodule right upper lobe (series 5,  image 114).  Continued attention follow-up be recommended.  Right upper lobe atelectasis.  Nonspecific debris is noted within the esophagus, finding which may be seen in the setting of esophageal dysmotility.  Tunnel right chest wall port catheter is noted.  Please refer to separately reported same day CTA of the chest, abdomen, and pelvis for additional details of findings below the level of clavicles.

## 2022-06-28 NOTE — PLAN OF CARE
[facility]  Department of Emergency Medicine   [address] [place]  [place], [place]   [phone]                                   HOSPICE  ORDERS     06/28/2022    Admit to Hospice:    Inpatient Service today    Life expectancy- less than 6 months    Code status: DNR       Diagnoses:  Active Hospital Problems    Diagnosis  POA    *Occlusion of right carotid artery [I65.21]  Unknown    History of thrombocytopenia [Z86.2]  Not Applicable    Left-sided weakness [R53.1]  Unknown    Aphasia [R47.01]  Unknown    Hypotension [I95.9]  Unknown    Respiratory distress [R06.03]  Unknown    Controlled type 2 diabetes mellitus without complication, without long-term current use of insulin [E11.9]  Yes    Myelodysplastic syndrome [D46.9]  Yes    Essential hypertension [I10]  Yes    CAD (coronary artery disease) [I25.10]  Yes    Bilateral carotid artery disease [I77.9]  Yes     Seen on carotid us from 04/06/16        Resolved Hospital Problems   No resolved problems to display.       Vital Signs: Routine.    Allergies:  Review of patient's allergies indicates:   Allergen Reactions    Revlimid [lenalidomide] Swelling     Facial swelling  6/8/17 - in the process of desensitation       Diet: per medical director/comfort feeding if tolerated    Activities: As tolerated    Nursing: Per Hospice Routine    Medications:         Comfort Care Medications Only      Current Discharge Medication List      CONTINUE these medications which have NOT CHANGED    Details   blood sugar diagnostic Strp To check BG 1 times daily, to use with insurance preferred meter; freestyle preferred  Qty: 100 each, Refills: 11    Associated Diagnoses: Controlled type 2 diabetes mellitus with stage 2 chronic kidney disease, without long-term current use of insulin      blood-glucose meter Misc Use to check blood glucose once daily  Qty: 1 each, Refills: 0    Associated Diagnoses: Controlled type 2 diabetes mellitus with stage 2 chronic kidney  disease, without long-term current use of insulin   evolocumab (REPATHA SURECLICK) 140 mg/mL PnIj Inject 140mg (1 pen) into the skin every 2 weeks.  Qty: 6 mL, Refills: 3   ketotifen (ZADITOR) 0.025 % (0.035 %) ophthalmic solution Place 2-3 drops into both eyes daily as needed (Allergies).             !! lancets 30 gauge Misc Use to test blood sugar daily  Qty: 100 each, Refills: 3    Associated Diagnoses: Controlled type 2 diabetes mellitus with stage 3 chronic kidney disease, without long-term current use of insulin      !! lancets 28 gauge Misc Use to test blood glucose once daily  Qty: 100 each, Refills: 3    Associated Diagnoses: Controlled type 2 diabetes mellitus with stage 2 chronic kidney disease, without long-term current use of insulin       ____________________  Kana OLAF Randall MD, [facility]  Emergency Medicine Staff  1:45 PM 6/28/2022      _________________________________    06/28/2022

## 2022-06-28 NOTE — CONSULTS
Palliative Medicine Consult.    Consult received and case discussed with Dr. Alves. MD spoke to patient's MPOA this morning who confirmed that patient is DNR/DNI. Family and MPOA are now at bedside and patient has been transitioned to comfort care. ED  is contacting [facility] to discuss possible transition to inpatient hospice if patient remains stable enough for transfer. Will defer visit at this time since goals are clear and plans are in process. Please contact our team if additional assistance is needed.    CHELA Hoskins, FNP-C  Palliative Medicine ext. [phone]

## 2022-06-28 NOTE — CONSULTS
[facility] - Emergency Dept  Vascular Neurology  [facility]  Consult Note    Inpatient consult to Vascular (Stroke) Neurology  Consult performed by: Chetan Simeon PA-C  Consult ordered by: Kumar Alves MD    Inpatient consult to Vascular (Stroke) Neurology  Consult performed by: Chetan Simeon PA-C  Consult ordered by: Soumya Dixon NP        Assessment/Plan:     Patient is a 71 y.o. year old female with:    * Occlusion of right carotid artery  Patient is a 71 year old female with PMH of DM, leukemia (chemotherapy), HLD, CAD, and carotid stenosis (s/p L CEA). She was found by her nurse following a syncopal episode while using the restroom. Patient was brought in to ED with L hemiplegia and aphasia. SBP on arrival was 74/44. Exam consistent with dense L hemiplegia (no movement with pain), R gaze, expressive aphasia/mute, sensation deficit on L side, and neglect on L. Initial NIHSS 22. Patient was taken for STAT CTA MP which revealed a R ICA occlusion with distal flow intracranially. Chart reviewed and notable for 80-99% R ICA  stenosis. Unclear if this is acute or chronic occlusion exacerbated by hypotension/hypoperfusion. No H/H available at the time of imaging interpretation; however, history of thrombocytopenia (most recent on 6/21/22 was PLT 91). Decision made to not give tpa based on thrombocytopenia and unstable BP. Patient has continued to decline and exam worsened during the course of evaluation. Developed respiratory distress requiring supplemental oxygen and continued to be hypotensive despite IV fluids. Not a thrombectomy candidate. Patient with DNR/DNI. Would recommend follow up MRI, but patient too unstable to go for scan at this time.     Recommend ICU care and continued ED care for now. Patient's family and POA notified by ED team.        Respiratory distress  SpO2 during evaluation as low as 71%   Arterial line placed by ED team   NC with supplemental O2 given   DNR/DNI in place      Hypotension  Requiring fluid boluses as well as pressor   MAP goal > 65   Consider transfusion if applicable     Aphasia  Mute   Able to follow commands   Progressively worsened as respiratory status declined     Left-sided weakness  Dense L hemiplegia   Recommend OT/PT when appropriate/should patient be admitted to unit     History of thrombocytopenia  Patient's H/H not available at the time of exam   Plt count of 91 on 6/21/22   PLT count for current ED visit pending     Controlled type 2 diabetes mellitus without complication, without long-term current use of insulin  Stroke RF   CBG on arrival 299, recheck glucose was 382     Myelodysplastic syndrome  Undergoing chemotherapy treatments     Essential hypertension  Stroke RF   Patient hypotensive at this time requiring fluid boluses and pressors to try to achieve normotension     Bilateral carotid artery disease  L CEA in 2011   R ICA stenosis on carotid US 6/21/22 80-99%   On ASA at home     CAD (coronary artery disease)  On ASA       STROKE DOCUMENTATION     Acute Stroke Times   Last Known Normal Date: 06/28/22  Last Known Normal Time: 0700  Symptom Onset Date: 06/28/22  Symptom Onset Time: 0729  Stroke Team Called Date: 06/28/22  Stroke Team Called Time: 0833  Stroke Team Arrival Date: 06/28/22  Stroke Team Arrival Time: 0835  CT Interpretation Time: 0849  Alteplase Recommended: No  CTA Interpretation Time: 0910  Thrombectomy Recommended: No    NIH Scale:  1a. Level of Consciousness: 1-->Not alert, but arousable by minor stimulation to obey, answer, or respond  1b. LOC Questions: 2-->Answers neither question correctly  1c. LOC Commands: 0-->Performs both tasks correctly  2. Best Gaze: 1-->Partial gaze palsy, gaze is abnormal in one or both eyes, but forced deviation or total gaze paresis is not present  3. Visual: 0-->No visual loss  4. Facial Palsy: 1-->Minor paralysis (flattened nasolabial fold, asymmetry on smiling)  5a. Motor Arm, Left: 4-->No  movement  5b. Motor Arm, Right: 0-->No drift, limb holds 90 (or 45) degrees for full 10 secs  6a. Motor Leg, Left: 4-->No movement  6b. Motor Leg, Right: 0-->No drift, leg holds 30 degree position for full 5 secs  7. Limb Ataxia: 0-->Absent  8. Sensory: 2-->Severe to total sensory loss, patient is not aware of being touched in the face, arm, and leg  9. Best Language: 3-->Mute, global aphasia, no usable speech or auditory comprehension  10. Dysarthria: 2-->Severe dysarthria, patients speech is so slurred as to be unintelligible in the absence of or out of proportion to any dysphasia, or is mute/anarthric  11. Extinction and Inattention (formerly Neglect): 2-->Profound lata-inattention/extinction more than 1 modality  Total (NIH Stroke Scale): 22    Modified Sumi    Daysi Coma Scale:    ABCD2 Score:    [id number] Score:   HAS -BLED Score:   ICH Score:   Hunt & Clemens Classification:       Thrombolysis Candidate? No, Increased risk of bleedign due to co-morbid conditions , Hemodynamically unstable BP with DNR/DNI in place     Delays to Thrombolysis?  Not Applicable    Interventional Revascularization Candidate?   Is the patient eligible for mechanical endovascular reperfusion (CARLOS)?  Appears to be chronic, hemodynamically unstable with DNR/DNI in place    Delays to Thrombectomy? Not Applicable    Hemorrhagic change of an Ischemic Stroke: Does this patient have an ischemic stroke with hemorrhagic changes? No     Subjective:     History of Present Illness:  Ms. Puente is a 71 year old female with PMH of DM, leukemia (chemotherapy), HLD, CAD, and carotid stenosis (s/p L CEA). She was found following a syncopal episode while using the restroom today around 7:29am when her nurse at [facility]. Patient was last seen normal by nurse at 7am prior to going to the bathroom. She was brought in to ED with L hemiplegia and aphasia. Stroke code was activated. SBP on arrival was 74/44. Exam consistent with dense L  hemiplegia (no movement with pain), R gaze, expressive aphasia/mute, sensation deficit on L side, and neglect on L. Initial NIHSS 22. Patient was taken for STAT CTA MP which revealed a R ICA occlusion with distal flow intracranially. Chart reviewed and notable for 80-99% R ICA  stenosis. Unclear if this is acute or chronic occlusion exacerbated by hypotension/hypoperfusion. No H/H available at the time of imaging interpretation; however, history of thrombocytopenia (most recent on 6/21/22 was PLT 91). Decision made to not give tpa based on thrombocytopenia and unstable BP. Patient continued to decline and exam worsened during the course of evaluation. Developed respiratory distress requiring supplemental oxygen and continued to be hypotensive despite IV fluids. Not a thrombectomy candidate. Patient with DNR/DNI. Unstable to get MRI at this time.             Past Medical History:   Diagnosis Date    Allergic rhinitis     Allergy     Anemia     Anxiety     CAD (coronary artery disease)     Carotid stenosis     Colon polyps 2016    Controlled type 2 diabetes mellitus without complication, without long-term current use of insulin 10/19/2016    Depression     GERD (gastroesophageal reflux disease)     Hearing loss in right ear     Hx of psychiatric care     Celexa, Prozac    Hypokalemia 2/3/2020    MDS (myelodysplastic syndrome) with 5q deletion     Psychiatric problem     Therapy      Past Surgical History:   Procedure Laterality Date    BONE MARROW BIOPSY Left 6/7/2018    Procedure: Biopsy-bone marrow;  Surgeon: Gita Valdes MD;  Location: [facility] OR [address] [place] [address];  Service: Oncology;  Laterality: Left;    BONE MARROW BIOPSY Left 3/21/2019    Procedure: Biopsy-bone marrow;  Surgeon: Gita Valdes MD;  Location: [facility] OR [address] [place] [address];  Service: Oncology;  Laterality: Left;    BONE MARROW BIOPSY Left 10/24/2019    Procedure: Biopsy-bone marrow;  Surgeon: Gita Valdes MD;  Location: [facility] OR [address] [place] [address];  Service: Oncology;   Laterality: Left;  left iliac crest    BONE MARROW BIOPSY Left 2021    Procedure: Biopsy-bone marrow;  Surgeon: iGta Valdes MD;  Location: [facility] ENDO (4TH FLR);  Service: Oncology;  Laterality: Left;    BUNIONECTOMY      CAROTID ENDARTERECTOMY Left     CAROTID STENT      COLONOSCOPY N/A 2016    Procedure: COLONOSCOPY;  Surgeon: PATRICIA Simpson MD;  Location: [facility] ENDO (4TH FLR);  Service: Endoscopy;  Laterality: N/A;  pt has vomiting with anesthesia in past    ENDOMETRIAL ABLATION      for endometriosis    INSERTION OF TUNNELED CENTRAL VENOUS CATHETER (CVC) WITH SUBCUTANEOUS PORT N/A 2020    Procedure: [id number];  Surgeon: Dosc Diagnostic Provider;  [facility];  Service: Radiology;  Laterality: N/A;  189    TONSILLECTOMY      TYMPANOSTOMY TUBE PLACEMENT       Family History   Problem Relation Age of Onset    Heart disease Mother     Hyperlipidemia Mother     Heart disease Father     Alcohol abuse Father     Cancer Paternal Grandmother         smoker; lung?    Alcohol abuse Brother         recovering    Colon cancer Neg Hx     Breast cancer Neg Hx      Social History     Tobacco Use    Smoking status: Former Smoker     Packs/day: 1.50     Years: 50.00     Pack years: 75.00     Types: Cigarettes, Vaping with nicotine     Quit date: 3/3/2017     Years since quittin.3    Smokeless tobacco: Never Used   Substance Use Topics    Alcohol use: No    Drug use: No     Review of patient's allergies indicates:   Allergen Reactions    Revlimid [lenalidomide] Swelling     Facial swelling  17 - in the process of desensitation       Medications: I have reviewed the current medication administration record.    (Not in a hospital admission)      Review of Systems   Unable to perform ROS: Mental status change (Aphasia, Mute)   Unable to complete ROS given patient's acute mental status change and severe aphasia.   Objective:     Vital Signs (Most  Recent):  Pulse: (!) 52 (06/28/22 0958)  Resp: (!) 28 (06/28/22 0958)  BP: (!) 84/57 (06/28/22 0920)  SpO2: 95 % (06/28/22 0832)    Vital Signs Range (Last 24H):  Pulse:  [39-59]   Resp:  [14-41]   BP: ()/(42-57)   SpO2:  [95 %-98 %]   Arterial Line BP: (47)/(16)     Physical Exam  Vitals and nursing note reviewed.   Constitutional:       General: She is in acute distress.      Appearance: She is ill-appearing and toxic-appearing. She is not diaphoretic.   HENT:      Head: Normocephalic and atraumatic.      Right Ear: External ear normal.      Left Ear: External ear normal.      Nose: No rhinorrhea.   Eyes:      General: No scleral icterus.     Comments: R gaze preference    Cardiovascular:      Rate and Rhythm: Bradycardia present.   Pulmonary:      Effort: Respiratory distress present.      Breath sounds: Stridor present.   Abdominal:      General: There is no distension.   Musculoskeletal:         General: No tenderness.      Cervical back: Normal range of motion.   Skin:     General: Skin is dry.   Neurological:      Sensory: Sensory deficit present.      Motor: Weakness present.   Psychiatric:      Comments: Unable to test due to acuity of patient's conditions       Neurological Exam:   LOC: drowsy  Attention Span: poor  Language: Expressive aphasia  Articulation: Mute/Anarthric  Orientation: Untestable due to severe aphasia   Visual Fields: Does not blink to threat bilaterally, does not track  EOM (CN III, IV, VI): Gaze preference    Facial Movement (CN VII): Lower facial weakness on the Left  Motor: Arm left  Plegia 0/5  Leg left  Plegia 0/5  Arm right  Normal 3/5  Leg right Normal 3/5  Sensation: Levon-hypoesthesia left      Laboratory:  CMP:   Recent Labs   Lab 06/28/22  0855   CALCIUM 8.8   ALBUMIN 2.9*   PROT 5.4*      K 5.9*   CO2 17*      BUN 58*   CREATININE 1.7*   ALKPHOS 56   ALT 60*   AST 32   BILITOT 0.5     CBC:   Recent Labs   Lab 06/28/22  0855 06/28/22  0902   WBC 18.23*  --     RBC 1.91*  --    HGB 5.5*  --    HCT 17.9* 21*     --    MCV 94  --    MCH 28.8  --    MCHC 30.7*  --      Lipid Panel:   Recent Labs   Lab 06/28/22  0855   CHOL 125   LDLCALC 57.6*   HDL 25*   TRIG 212*     Coagulation:   Recent Labs   Lab 06/28/22  0856   INR 1.1     Hgb A1C: No results for input(s): HGBA1C in the last 168 hours.  TSH:   Recent Labs   Lab 06/28/22  0855   TSH 3.237       Diagnostic Results:      Brain/Vessel imaging:  CTA MP 6/28/22   1. No definite acute intracranial findings.   2. CTA demonstrates essentially complete lack of contrast opacification of the majority of the right common carotid and cervical internal carotid arteries, with some reconstitution of the distal cervical right ICA near the skull base. Some degree of reconstitution of the intracranial ICA suggested, which could indicate chronic proximal carotid steno-occlusive disease.  3. Linear somewhat shelf-like filling defect within the posteromedial aspect of the mid left common carotid artery could indicate dissection versus web.  4. No definite evidence of acute intracranial large vessel occlusion.  Multifocal intracranial atherosclerotic disease, as described.  5. Suggested partially calcified 6 mm aneurysm distal right A2 EARL branch.  6. Solid pulmonary nodules for which attention on follow-up be recommended.       Chetan Simeon PA-C  Com[facility]  Department of Vascular Neurology   [facility] Dept

## 2022-06-28 NOTE — ED NOTES
Dr. Heath at bedside. After discussion with family and POA, all medications discontinued, NRB & CO2 monitor taken off pt. Family to remain at bedside.

## 2022-06-28 NOTE — TELEPHONE ENCOUNTER
Have been checking in with pt about exjade refill with no answer. Per chart, currently in hospital with possible stroke. Will continue to monitor.

## 2022-06-28 NOTE — ASSESSMENT & PLAN NOTE
SpO2 during evaluation as low as 71%   Arterial line placed by ED team   NC with supplemental O2 given   DNR/DNI in place

## 2022-06-28 NOTE — ASSESSMENT & PLAN NOTE
Stroke RF   Patient hypotensive at this time requiring fluid boluses and pressors to try to achieve normotension

## 2022-06-28 NOTE — PLAN OF CARE
Passages consulted  However, Ms Puente passed away here  Family at bedside    [facility] - Emergency Dept  Initial Discharge Assessment       Primary Care Provider: Claudia Rapp MD    Admission Diagnosis: Stroke [I63.9]    Admission Date: 6/28/2022  Expected Discharge Date:          Payor: HU[employer] MANAGED MEDICARE / Plan: HU[employer] MEDICARE HMO / Product Type: Capitation /     Extended Emergency Contact Information  Primary Emergency Contact: MANUEL MCGHEE  Mobile Phone: [phone]  Relation: Healthcare Power of   Preferred language: English   needed? No  Secondary Emergency Contact: Adi Puente  Mobile Phone: [phone]  Relation: Other              [facility] [place]  [address] [address]  Phone: [phone] Fax: [phone]    [facility]  [address] [address]  Phone: [phone] Fax: [phone]

## 2022-06-28 NOTE — CONSULTS
Inpatient consult to Physical Medicine Rehab  Consult performed by: Eden Pickering NP  Consult ordered by: Soumya Dixon NP  Reason for consult: Assess rehab needs      Reviewed patient history and current admission.  Rehab team following.  Full consult to follow once closer to medical stability and able to participate with therapy.    CHELA Hendricks, FNP-C  Physical Medicine & Rehabilitation   06/28/2022

## 2022-06-28 NOTE — HPI
Ms. Puente is a 71 year old female with PMH of DM, leukemia (chemotherapy), HLD, CAD, and carotid stenosis (s/p L CEA). She was found following a syncopal episode while using the restroom today around 7:29am when her nurse at [facility]. Patient was last seen normal by nurse at 7am prior to going to the bathroom. She was brought in to ED with L hemiplegia and aphasia. Stroke code was activated. SBP on arrival was 74/44. Exam consistent with dense L hemiplegia (no movement with pain), R gaze, expressive aphasia/mute, sensation deficit on L side, and neglect on L. Initial NIHSS 22. Patient was taken for STAT CTA MP which revealed a R ICA occlusion with distal flow intracranially. Chart reviewed and notable for 80-99% R ICA  stenosis. Unclear if this is acute or chronic occlusion exacerbated by hypotension/hypoperfusion. No H/H available at the time of imaging interpretation; however, history of thrombocytopenia (most recent on 6/21/22 was PLT 91). Decision made to not give tpa based on thrombocytopenia and unstable BP. Patient continued to decline and exam worsened during the course of evaluation. Developed respiratory distress requiring supplemental oxygen and continued to be hypotensive despite IV fluids. Not a thrombectomy candidate. Patient with DNR/DNI. Unstable to get MRI at this time.

## 2022-06-28 NOTE — ED TRIAGE NOTES
Pt brought in from [facility] for unresponsiveness. Pt was using the restroom when she became unresponsive. Stroke code activated for expressive aphasia and left side paralysis. Pt a GCS of 7.

## 2022-06-29 LAB
ACID FAST MOD KINY STN SPEC: NORMAL
MYCOBACTERIUM SPEC QL CULT: NORMAL

## 2022-07-01 ENCOUNTER — TELEPHONE (OUTPATIENT)
Dept: INTERNAL MEDICINE | Facility: CLINIC | Age: 72
End: 2022-07-01
Payer: MEDICARE

## 2022-07-01 LAB
BACTERIA BLD CULT: ABNORMAL
BLD PROD TYP BPU: NORMAL
BLOOD UNIT EXPIRATION DATE: NORMAL
BLOOD UNIT TYPE CODE: 6200
BLOOD UNIT TYPE: NORMAL
CODING SYSTEM: NORMAL
DISPENSE STATUS: NORMAL
NUM UNITS TRANS PACKED RBC: NORMAL

## 2022-07-01 NOTE — TELEPHONE ENCOUNTER
Spoke with Noemi at the  home and she stated that she see it in LEERS and it is not signed. Noemi stated that you will need to contact LEERS to see why you do not have access.

## 2022-07-01 NOTE — TELEPHONE ENCOUNTER
I was out of office yesterday. I signed in to [facility] and it is not listed. Did they re-route to ED doctor?

## 2022-07-01 NOTE — TELEPHONE ENCOUNTER
If appropriate are you able to check LEERS to sign pt's death certificate.    Please advise,  Thank You.

## 2022-07-01 NOTE — TELEPHONE ENCOUNTER
----- Message from Khushbu Huffman sent at 2022  2:12 PM CDT -----  Contact: [phone]  Noemi with the  home is calling they are needing the dr to sign off on the pts death certificate please give return call she states she sent this over as well

## 2022-07-01 NOTE — TELEPHONE ENCOUNTER
Spoke with Jena @ [facility]. EMS was called and [place] was [place] [facility] [facility] . They will call back with any information given from [facility] however records are available in Epic for review.

## 2022-07-01 NOTE — TELEPHONE ENCOUNTER
Can you please ask [facility] for any more details on her death. Circumstances/Cause of death? I am being asked to complete death certificate.

## 2022-07-28 NOTE — TELEPHONE ENCOUNTER
Death certificate completed.    This patient has been scheduled.    Pastora wheeler Procedure   Dermatology, General and Plastic Surgery, Urology Specialties   [facility] [place] [address]

## 2022-09-20 NOTE — PHYSICIAN QUERY
Group Topic: BH Activity Group    Date: 2022  Start Time: 1300  End Time: 1400  Facilitators: Sobia Collins    Focus: Mood Scale  Number in attendance: 5    Method: Group   Attendance: Present  Participation: Active  Patient Response: Able to return demonstration, Attentive and Interested in topic  Mood: Normal  Affect: Type: Euthymic (normal mood)   Range: Full (normal)   Congruency: Congruent   Stability: Stable  Behavior/Socialization: Appropriate to group, Cooperative and Engaged  Thought Process: Unremarkable  Task Performance: Follows directions  Patient Evaluation: Independent - full participation     Title: Mood Scale    Objective: Patient will create a personalized mood scale to share with their family to aid in communication.     Participation: Patient attended group and had active participation and moderate effort in creating mood scale. Patient was able to identify each level of mood intensity but had some difficulty with what type of interaction/assistance they want from their family. Pt was appropriate and mostly attentive during the discussion and was able to share their work with the group. Pt identified the followin-miserable- console    2-despair- my dog    3-bad- music    4-\"ALICIA\"- yousif    5-normal- nothing    6-ok- nothing    7-good- go shopping    8-great- give me candy    9-\"smacks lips\" wow- horror    10-  Me when I see Joaquina/Anant Mendoza- perfect       PT Name: Susan Puente  MR #: [id number]     Physician Query Form - Diagnosis Clarification      CDS/: Telma Palencia RN     Contact information:[email or web address]    This form is a permanent document in the medical record.     Query Date: March 19, 2019    By submitting this query, we are merely seeking further clarification of documentation.  Please utilize your independent clinical judgment when addressing the question(s) below.     The medical record contains the following:      Findings Supporting Clinical Information Location in Medical Record   Transfusion reaction Pt admitted to BMT service for sob/unresponsive episode presumptively from transfusion reaction. CT head was negative for acute changes. CXR showed bilateral diffuse interstitial coarsening suggesting pulmonary edema. She was initially placed on non-rebreather than de-escalated to NC. Mental status had improved to baseline upon evaluation in ED. She was noted to be pancytopenic with Hgb 5.6, WBC 1.2, and plts 21. She was given 1 unit pRBC. Smear showed occasional spherocytes, but labs (normal haptoglobin, low LDH) were not suggestive of hemolysis, though reticulocytes were elevated to 6.2%. It was suspected that patient's profound anemia contributed to syncopal episode prior to admit.    Problems resolved during this admission  Episode of unresponsiveness  Altered level of consciousntess Discharge summary                                    Discharge summary       Please clarify if the __Transfusion reaction_______ diagnosis has been:    [  ] Ruled In   [  ] Ruled In, Now Resolved   [ x ] Ruled Out   [  ] Other/Clarification of findings (please specify):     [  ] Clinically undetermined     Please document in your progress notes daily for the duration of treatment, until resolved, and include in your discharge summary.

## 2022-10-08 NOTE — PLAN OF CARE
Problem: Anemia (Adult)  Goal: Identify Related Risk Factors and Signs and Symptoms  Related risk factors and signs and symptoms are identified upon initiation of Human Response Clinical Practice Guideline (CPG)   Outcome: Ongoing (interventions implemented as appropriate)  Pt. Here today for 1 unit PRBC. Peripheral IV started in L. Forearm. Infusing without difficulty. VSS. Reports fatigue, elevated HR at times. No questions or concerns.      
Problem: Patient Care Overview  Goal: Plan of Care Review  Outcome: Ongoing (interventions implemented as appropriate)  Pt. Tolerated transfusion. VSS. Peripheral IV flushed and removed.       
English

## 2022-10-22 NOTE — PLAN OF CARE
Problem: Physical Therapy  Goal: Physical Therapy Goal  Description: PT goals until 6/3/22    1. Pt sit to stand with RW with supervision-not met  2. Pt to perform gait 200ft with RW with supervision.-not met  3. Pt to go up/down curb step with RW with SBA.-not met  4. Pt to up/down 12 steps with R UE rail with no AD with CGA.-not met  5. Pt to perform B LE exs in sitting or supine x 15 reps to strengthen B LE to improve functional mobility.-not met  6. Pt to propel w/c 100ft with B UE on level surface with mod independent-not met  7. Pt to  object off the floor with reacher with RW support with set up assist-not met  8. Pt to ambulated 10ft over uneven surface with Rw with supervision-not met    Outcome: Ongoing, Progressing      None stated

## 2023-04-02 NOTE — SUBJECTIVE & OBJECTIVE
Subjective:     Interval History: Remains afebrile off IV abx. Continuing oral abx through 4/30 for CAP treatment. Today is day 4 of 7 of treatment. Plan is to discharge home with home health pending home oxygen approval.      Objective:     Vital Signs (Most Recent):  Temp: 98.3 °F (36.8 °C) (04/27/22 0803)  Pulse: 92 (04/27/22 0815)  Resp: 18 (04/27/22 0815)  BP: (!) 174/74 (04/27/22 0803)  SpO2: 96 % (04/27/22 0815)   Vital Signs (24h Range):  Temp:  [97.7 °F (36.5 °C)-98.8 °F (37.1 °C)] 98.3 °F (36.8 °C)  Pulse:  [] 92  Resp:  [17-20] 18  SpO2:  [90 %-97 %] 96 %  BP: (133-188)/(65-85) 174/74     Weight: 69.5 kg (153 lb 3.5 oz)  Body mass index is 25.11 kg/m².  Body surface area is 1.79 meters squared.    ECOG SCORE             Intake/Output - Last 3 Shifts         04/25 0700  04/26 0659 04/26 0700  04/27 0659 04/27 0700  04/28 0659    P.O. 960 1080     Blood  700     IV Piggyback 99.2      Total Intake(mL/kg) 1059.2 (15.2) 1780 (25.6)     Urine (mL/kg/hr)       Emesis/NG output       Stool       Total Output       Net +1059.2 +1780            Urine Occurrence  3 x     Stool Occurrence  1 x             Physical Exam  Vitals and nursing note reviewed.   Constitutional:       General: She is not in acute distress.     Appearance: Normal appearance. She is well-developed. She is not diaphoretic.   HENT:      Head: Normocephalic and atraumatic.   Eyes:      General: No scleral icterus.     Conjunctiva/sclera: Conjunctivae normal.   Neck:      Vascular: No JVD.      Trachea: No tracheal deviation.   Cardiovascular:      Rate and Rhythm: Normal rate and regular rhythm.      Heart sounds:     No friction rub. No gallop.   Pulmonary:      Effort: No respiratory distress.      Breath sounds: No wheezing.      Comments: Course breath sounds jonh lung fields  Abdominal:      General: Bowel sounds are normal. There is no distension.      Palpations: Abdomen is soft. There is no mass.   Musculoskeletal:          General: No deformity. Normal range of motion.      Cervical back: Normal range of motion and neck supple.   Lymphadenopathy:      Cervical: No cervical adenopathy.   Skin:     General: Skin is warm and dry.      Coloration: Skin is not pale.   Neurological:      Mental Status: She is alert.       Significant Labs:   CBC:   Recent Labs   Lab 04/26/22 0323 04/27/22 0419   WBC 2.49* 2.26*   HGB 6.9* 8.7*   HCT 20.9* 26.6*   * 141*     , CMP:   Recent Labs   Lab 04/26/22 0323 04/27/22 0419   * 135*   K 3.7 3.5   CL 93* 93*   CO2 30* 32*   * 98   BUN 11 14   CREATININE 0.7 0.7   CALCIUM 8.8 8.7   PROT 5.9* 5.9*   ALBUMIN 2.5* 2.5*   BILITOT 0.2 0.6   ALKPHOS 65 62   AST 21 17   ALT 30 28   ANIONGAP 10 10   EGFRNONAA >60.0 >60.0     , and Reticulocytes: No results for input(s): RETIC in the last 48 hours.    Diagnostic Results:  I have reviewed and interpreted all pertinent imaging results/findings within the past 24 hours.   Initial (On Arrival)

## 2023-07-12 NOTE — PT/OT/SLP PROGRESS
"Occupational Therapy   Treatment    Name: Susan Puente  MRN: [id number]  Admit Date: 5/19/2022  Admitting Diagnosis:  Acute respiratory failure with hypoxia    General Precautions: Standard, fall   Orthopedic Precautions:N/A   Braces:       Recommendations:     Discharge Recommendations: home with home health  Level of Assistance Recommended at Discharge: Intermittent assistance for ADL's and homemaking tasks  Discharge Equipment Recommendations:  bedside commode, shower chair, walker, rolling  Barriers to discharge:  Inaccessible home environment, Decreased caregiver support    Assessment:     Susan Puente is a 71 y.o. female with a medical diagnosis of Acute respiratory failure with hypoxia .  She presents with Performance deficits affecting function are  weakness, impaired endurance, impaired self care skills, impaired functional mobility, gait instability, impaired balance, decreased safety awareness, pain, impaired cardiopulmonary response to activity, decreased upper extremity function, and decreased lower extremity function  impacting safety and performance in self care, functional transfers, and functional mobility. Pt participated and tolerated tx well on today. Pt required increase time with all task due to rest breaks for fatigue.  Pt performed all bed mobility with HOB slightly elevated and use of bed rail. Pt performed Oral care standing requiring seated rest break due to fatigue. Pt still demonstrate deficits the limits independence with  fxl self care and mobility. Pt will continue to benefits from skilled OT to improve towards goals.    Rehab Potential is good    Activity tolerance:  Good    Plan:     Patient to be seen 6 x/week to address the above listed problems via self-care/home management, therapeutic activities, therapeutic exercises    · Plan of Care Expires: 06/20/22  · Plan of Care Reviewed with: patient    Subjective     Communicated with: Dennis prior to session. "Im ready " "today". A client care conference was performed between the SHERLYR and MARLOW, prior to treatment to discuss the patient's status, treatment plan and established goals.     Pain/Comfort:  · Pain Rating 1: 0/10  · Pain Rating Post-Intervention 1: 0/10    Patient's cultural, spiritual, Evangelical conflicts given the current situation:  · no    Objective:     Patient found supine with oxygen, PureWick upon OT entry to room.    Bed Mobility:    · Patient completed Rolling/Turning to Left with  stand by assistance and with side rail  · Patient completed Scooting/Bridging with stand by assistance  · Patient completed Supine to Sit with stand by assistance and with side rail     Functional Mobility/Transfers:  · Patient completed Sit <> Stand Transfer with stand by assistance  with  rolling walker   · Patient completed Bed <> Chair Transfer using Step Transfer technique with stand by assistance with rolling walker  · Functional Mobility: Pt perform fxl ambulating from BS> Sink with Close SBA    Activities of Daily Living:  · Grooming: stand by assistance to perform oral care standing at sink. Pt required seated break after task due to fatigue and lightheaded  · Bathing: stand by assistance to perform sponge bathing at sink   · Upper Body Dressing: supervision to perform doffing/donning pullover shirt   · Lower Body Dressing: stand by assistance to perform doffing/donning pants/knitted underwear seated on raised toilet  · Toileting: stand by assistance to perform hygiene and clothing management     [facility] 6 Click ADL: 19    Treatment & Education:    · Pt completed ADLs and func mobility activities for tx session as noted above    · Pt educated on role of OT and POC    Patient left up in chair with call button in reachEducation:      GOALS:   Multidisciplinary Problems     Occupational Therapy Goals        Problem: Occupational Therapy    Goal Priority Disciplines Outcome Interventions   Occupational Therapy Goal     OT, PT/OT " Ongoing, Progressing    Description: Goals to be met by: 6/3/22    Patient will increase functional independence with ADLs by performing:    UE Dressing with [place].  LE Dressing with Modified [place].  Grooming while standing at sink with Supervision.  Toileting from toilet with Supervision Assistance for hygiene and clothing management.   Bathing from  shower chair/bench with Supervision.  Step transfer with Supervision using RW.   Toilet transfer to toilet with Supervision.  Upper extremity exercise program with supervision.                         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  Patient brought in by EMS found with Peaked Ts on EKG. Patient reports last urine 10am. Denies cp, sob, diarrhea.  concern for rhabdomyolysis causing SUDHIR and hyperkalemia.   ekg, cbc, cmp, mag, phos, tox, ck, vbg, fluids, cxr, ct a/p

## 2023-07-28 NOTE — ASSESSMENT & PLAN NOTE
- Pt with 2 weeks of productive cough, nasal congestion  - Upon arrival to the hospial she was hypoxic requiring 3L NC, weaned to 2L today  - CXR with likely develop consolidation  - CTA shows New patchy consolidative opacities in the in the right upper and posterior right lower lobes.  Findings suggestive infectious etiology such as pneumonia, multifocal pneumonia.  Also consider atypical given patient's reported immunocompromised status.  Aspiration could present similarly.  Consider continued follow-up after acute clinical illness has resolved to ensure resolution. New mediastinal and bilateral hilar lymphadenopathy, likely reactive.  - completed azithromycin and 7 day course of Levaquin on 4/30.  - CXR on admission showing possible developing consolidation: Cefepime 4/30-5/3   - Repeat CT Chest 5/6   Hydroxychloroquine Pregnancy And Lactation Text: This medication has been shown to cause fetal harm but it isn't assigned a Pregnancy Risk Category. There are small amounts excreted in breast milk.

## 2023-10-11 NOTE — ASSESSMENT & PLAN NOTE
-- DO NOT REPLY / DO NOT REPLY ALL --  -- Message is from [facility] [employer] ([facility]) --    General Patient Message:  Inés is calling to inform that daughter is concerned of sodium level is low and is requesting labs and states either please put orders in or call and give a verbal. Please advise     Caller Information       Type Contact Phone/Fax    10/11/2023 04:06 PM CDT Phone (Incoming) inés (Nurse) [phone]     [facility]        Alternative phone number: none   Can a detailed message be left? Yes    Message Turnaround:     Is it Working Hours? Yes - Working Hours     [place]:    Please give this turnaround time to the caller:   \"This message will be sent to [state Provider's name]. The clinical team will fulfill your request as soon as they review your message.\"                 --increase dosing to home ACEi/HCTZ dose

## 2024-01-19 NOTE — PT/OT/SLP PROGRESS
"Occupational Therapy   Treatment    Name: Susan Puente  MRN: [id number]  Admit Date: 5/19/2022  Admitting Diagnosis:  Acute respiratory failure with hypoxia    General Precautions: Standard, fall   Orthopedic Precautions:N/A   Braces:       Recommendations:     Discharge Recommendations: home with home health  Level of Assistance Recommended at Discharge: Intermittent assistance for ADL's and homemaking tasks  Discharge Equipment Recommendations:  bedside commode, walker, rolling  Barriers to discharge:  Decreased caregiver support    Assessment:     Susan Puente is a 71 y.o. female with a medical diagnosis of Acute respiratory failure with hypoxia . Performance deficits affecting function are impaired endurance, impaired self care skills, impaired functional mobilty, gait instability, impaired balance, decreased lower extremity function, decreased coordination, decreased safety awareness, impaired cardiopulmonary response to activity, decreased ROM. Pt. participated well with session on this day. Pt is progressing well with session on this day still continues to requires cues with aspects of safety . Pt. Will continue to benefit from continued OT to progress towards goals    Rehab Potential is good    Activity tolerance:  Good    Plan:     Patient to be seen 6 x/week to address the above listed problems via self-care/home management, therapeutic activities, therapeutic exercises    · Plan of Care Expires: 06/20/22  · Plan of Care Reviewed with: patient    Subjective     Communicated with: [email or web address]  prior to session. "Goodmorning"    Pain/Comfort:  Pain Rating 1: 0/10  Pain Rating Post-Intervention 1: 0/10    Patient's cultural, spiritual, Yazidism conflicts given the current situation:  no    Objective:     Patient found HOB elevated upon OT entry to room.    Bed Mobility:    · Patient completed Supine to Sit with modified independence     Functional Mobility/Transfers:  · Patient completed Sit <> Stand " 28, 2023               Content Version: 13.9  © 3833-6729 [employer].   Care instructions adapted under license by [employer]. If you have questions about a medical condition or this instruction, always ask your healthcare professional. [employer] disclaims any warranty or liability for your use of this information.     Transfer with supervision  with  no assistive device   · Patient completed Bed <> Chair Transfer using Stand Pivot technique with supervision with no assistive device  · Patient completed Toilet Transfer Stand Pivot technique with supervision with  grab bars  · Functional Mobility: Pt. Able to propel self throughout room and bathroom in w/c with good use of BUEs noted    Activities of Daily Living:  · Feeding:  modified independence with breakfast  · Grooming: modified independence with grooming aspects while seated  · Bathing: supervision with all bathing aspects while seated at sink level. shower not tested 2* to port on chest area  · Upper Body Dressing: modified independence to doff/julius pullover shirt  · Lower Body Dressing: supervision to doff/julius BLE socks and pants over hips instance  · Toileting: supervision with hygiene and clothing management    [facility] 6 Click ADL: 21    Treatment & Education:  Pt. Educated on safety with ADL tasks as well as functional mobility and need for staff assist.     Patient left up in chair with all lines intact and call button in reachEducation:      GOALS:   Multidisciplinary Problems     Occupational Therapy Goals        Problem: Occupational Therapy    Goal Priority Disciplines Outcome Interventions   Occupational Therapy Goal     OT, PT/OT Ongoing, Progressing    Description: Goals to be met by: 6/3/22    Patient will increase functional independence with ADLs by performing:    UE Dressing with [place]. = MET  LE Dressing with Modified [place].  Grooming while standing at sink with Supervision.  Toileting from toilet with Supervision Assistance for hygiene and clothing management.   Bathing from shower chair/bench with Supervision.  Step transfer with Supervision using RW. = MET  Toilet transfer to toilet with Supervision. = MET  Upper extremity exercise program with supervision.                         Time Tracking:     OT Date of Treatment: 06/02/22  OT Start  Time: 0759    OT Stop Time: 0843  OT Total Time (min): 44 min    Billable Minutes:Self Care/Home Management 44    6/2/2022   OT and MARLOW have discussed the above patients goals and status in collaboration with Plan of Care.

## 2024-04-03 NOTE — PROGRESS NOTES
Subjective:       Patient ID: Susan Puente is a 67 y.o. female.    Chief Complaint: myelodysplastic syndrome    Patient presents today for follow up of her history of MDS 5 q del syndrome.  Revlimid has been stopped since June 2017 after she developed pancytopenia while on Revlimid (required desensitization). CBC was normal for almost 1 year and marrow was negative for del5q. Bone marrow biopsy repeat from last week shows relapsed 5q minus MDS without new or additional mutations. Hemoglobin is 7.7 grams.     History   Ms. Puente is a 66 year old female with hx of DM2, peripheral vascular disease, tobacco use, CAD, hyperlipidemia, hypertension who was hospitalized 11/10/16 for anemia. hgb 5.3  MCH 43.4 with normal WBC and platelets. Patient had normal iron stores. She was transfused 3 units of PRBCs and discharged home with hgb 8.7 on 11/11/16. She was referred for further evalutation of her anemia. On 12/16/16 patient had a normal SPEP and immunofixation. Slightly elevated kappa light chains with normal ration. Elevated vitamin b12, normal folate, JAYDEN was positive with a low titer and negative profile. Patient had a bone marrow biopsy 1/5/16 which showed the core biopsy is normocellular for age (40%); however, megakaryocytes are increased and show  frequent small, hypolobated forms. Additionally, a subset of the neutrophils are hypogranular. Blasts are not increased by either morphology (1.2%) or in the corresponding flow cytometric analysis. Fish detects a 5q deletion in 54.5% of nuclei. Cytogenetics reported 20 metaphases, 2 metaphases were normal and 18 metaphases had a 5q deletion. No additional cytogenetic abnormalities were detected. Findings consistent with 5q deletion syndrome.     Patient had a delay in obtaining revlimid 10 mg daily but did start taking the medication 2/8/17. On 2/9/17 patient developed a diffuse maculopapular rash throughout scalp arms, legs and torso. She states she had  no stridor or wheezing. She discontinued medication 2/15/17. Went to allergist who provided references on desensitization so that patient could resume Revlimid. Unfortunately developed pancytopenia and Revlimid stopped 6/16/17.   She had a repeat BMBX  performed 6/8/17 and showed a hypercellular marrow (60%) continued atypia in the granulocytes and megakaryocytes were noted.  Additionally, there is erythroid atypia present. No increase in blasts. Cytogenetics are normal and MDS FISH is negative, failing to show 5 q minus. NGS should no significant molecular mutations.  Anemia work up revealed bienvenido negative hemolysis.      Review of Systems   Constitutional: Positive for fatigue. Negative for fever.   HENT: Negative for congestion and trouble swallowing.    Respiratory: Positive for shortness of breath. Negative for cough.    Cardiovascular: Negative for chest pain and leg swelling.   Gastrointestinal: Negative for abdominal distention and abdominal pain.   Endocrine: Negative for polyphagia and polyuria.   Genitourinary: Negative for dysuria and urgency.   Musculoskeletal: Positive for arthralgias. Negative for myalgias.   Skin: Negative for color change and pallor.   Neurological: Negative for light-headedness and headaches.   Hematological: Negative for adenopathy. Does not bruise/bleed easily.   Psychiatric/Behavioral: Negative for agitation and behavioral problems.       Objective:      Physical Exam   Constitutional: She is oriented to person, place, and time. She appears well-developed and well-nourished.   HENT:   Mouth/Throat: Oropharynx is clear and moist. No oropharyngeal exudate.   Eyes: Conjunctivae are normal. Pupils are equal, round, and reactive to light.   Cardiovascular: Normal rate and regular rhythm.    Pulmonary/Chest: Effort normal and breath sounds normal.   Abdominal: Soft. Bowel sounds are normal.   Musculoskeletal: Normal range of motion.   Lymphadenopathy:     She has no cervical  adenopathy.   Neurological: She is alert and oriented to person, place, and time.   Skin: Skin is warm and dry.   Psychiatric: She has a normal mood and affect. Her behavior is normal.       Assessment:       1. MDS (myelodysplastic syndrome) with 5q deletion        Plan:   MDS. Initially with 5 q minus syndrome and treated with Revlimid. Developed allergy and was then desensitized. Soon after developed pancytopenia and Revlimid held since June 2017.  BMBX,6/8/17, had normal cytogenetics. Repeat marrow from 6/7/18 shows relapsed 5q minus. Discussed treatment options of HMA therapy or repeat trial of Revlimid. Patient prefers to repeat revlimid. Due to prior allergy history plan to treat with escalating dosing = 2.5 weekly, 2x weekly, 3x weekly, 5x weekly, daily with premedication with prednisone. Once on daily dosing we will increase dosage. First dose to be given in clinic with observation. Plan for labs weekly with type and screen for interval transfusion support.  Patient to call clinic once she receives Revlimid.     02-Apr-2024 00:02

## 2025-03-05 NOTE — SUBJECTIVE & OBJECTIVE
Patient information was obtained from patient and ER records.       Facility-Administered Medications Prior to Admission   Medication Dose Route Frequency Provider Last Rate Last Dose    0.9%  NaCl infusion (for blood administration)   Intravenous Q24H PRN Jacinda Ramires MD        acetaminophen tablet 650 mg  650 mg Oral 1 time in Clinic/HOD Gita Valdes MD         Medications Prior to Admission   Medication Sig Dispense Refill Last Dose    acetaminophen (TYLENOL ARTHRITIS PAIN) 650 MG TbSR Take 650 mg by mouth every 8 (eight) hours as needed (for pain).   3/12/2019    alirocumab (PRALUENT PEN) 75 mg/mL PnIj Inject 1 mL (75 mg total) into the skin every 14 (fourteen) days. 6 Syringe 3 3/12/2019    aspirin (ECOTRIN) 81 MG EC tablet Take 81 mg by mouth once daily.   3/12/2019    b complex vitamins capsule Take 1 capsule by mouth once daily.   3/12/2019    blood sugar diagnostic Strp 1 each by Misc.(Non-Drug; Combo Route) route once daily. 30 strip 11 3/12/2019    blood-glucose meter kit Use as instructed 1 each 0 3/12/2019    CETIRIZINE HCL (ZYRTEC ORAL) Take by mouth.   3/12/2019    citalopram (CELEXA) 20 MG tablet Take 1 tablet (20 mg total) by mouth once daily. 30 tablet 2 3/12/2019    citalopram (CELEXA) 20 MG tablet TAKE ONE TABLET BY MOUTH EVERY DAY 30 tablet 2 3/12/2019    fexofenadine 30 mg TbDL Take by mouth.   3/12/2019    fish oil-omega-3 fatty acids 300-1,000 mg capsule Take 4 g by mouth nightly.    3/12/2019    lancets (FREESTYLE LANCETS) 28 gauge Misc 1 lancet by Misc.(Non-Drug; Combo Route) route once daily. 30 each 11 3/12/2019    lenalidomide (REVLIMID) 10 mg Cap Take 10 mg by mouth once daily auth # [id number] on 2/19/19. 28 each 0 3/12/2019    lisinopril-hydrochlorothiazide (PRINZIDE,ZESTORETIC) 20-12.5 mg per tablet TAKE 2 TABLETS EVERY  tablet 0 Past Week    magnesium 30 mg Tab Take by mouth once.   3/12/2019    metFORMIN (GLUCOPHAGE-XR) 500 MG 24 hr tablet Take 1  tablet (500 mg total) by mouth daily with breakfast. 90 tablet 3 3/12/2019    NICOTROL 10 mg Crtg INHALE ONE PUFF INTO THE MOUTH AS NEEDED MAXIMUM 6 CARTRIDGES/DAY. 168 each 0 3/12/2019    pantoprazole (PROTONIX) 40 MG tablet Take 1 tablet (40 mg total) by mouth once daily. 30 tablet 1 3/12/2019    potassium 99 mg Tab Take by mouth once.   3/12/2019    predniSONE (DELTASONE) 5 MG tablet Take 2 tablets by mouth as directed by MD in clinic. 60 tablet 0 3/12/2019    TRUE METRIX GLUCOSE METER Misc    3/12/2019    walker Misc 1 each by Misc.(Non-Drug; Combo Route) route once daily at 6am. 1 each 0 3/12/2019       Revlimid [lenalidomide]     Past Medical History:   Diagnosis Date    Allergic rhinitis     Allergy     Anemia     Anxiety     CAD (coronary artery disease)     Carotid stenosis     Controlled type 2 diabetes mellitus without complication, without long-term current use of insulin 10/19/2016    Depression     GERD (gastroesophageal reflux disease)     Hearing loss in right ear     Hx of psychiatric care     Celexa, Prozac    MDS (myelodysplastic syndrome) with 5q deletion     Psychiatric problem     Therapy      Past Surgical History:   Procedure Laterality Date    Biopsy-bone marrow Left 6/7/2018    Performed [facility] at [facility] OR 2ND FLR    BIOPSY-BONE MARROW Left 6/8/2017    Performed [facility] at [facility] OR 2ND [facility]LR    BIOPSY-BONE MARROW N/A 1/5/2017    Performed [facility] at [facility] OR 2ND [facility]LR    BUNIONECTOMY      CAROTID ENDARTERECTOMY Left 2011    CAROTID STENT      COLONOSCOPY N/A 12/20/2016    Performed by PATRIICA Simpson MD at [facility] ENDO (4TH FLR)    ENDOMETRIAL ABLATION      for endometriosis    TONSILLECTOMY      TYMPANOSTOMY TUBE PLACEMENT       Family History     Problem Relation (Age of Onset)    Alcohol abuse Father, Brother    Cancer Paternal Grandmother    Heart disease Mother, Father    Hyperlipidemia Mother        Tobacco Use    Smoking status:  Former Smoker     Packs/day: 0.50     Years: 36.00     Pack years: 18.00     Types: Cigarettes, Vaping with nicotine     Last attempt to quit: 3/3/2017     Years since quittin.0    Smokeless tobacco: Never Used   Substance and Sexual Activity    Alcohol use: No     Comment: rare, social    Drug use: No    Sexual activity: Not on file       Review of Systems   Constitutional: Negative for chills, fatigue and fever.   Respiratory: Negative for cough, chest tightness, shortness of breath and wheezing.    Cardiovascular: Negative for chest pain, palpitations and leg swelling.   Gastrointestinal: Negative for abdominal distention, abdominal pain, blood in stool, nausea and vomiting.   Genitourinary: Negative for difficulty urinating, dysuria, frequency and urgency.   Musculoskeletal: Negative for back pain, gait problem, joint swelling and myalgias.   Skin: Negative for pallor and rash.   Neurological: Negative for dizziness, tremors, weakness, numbness and headaches.     Objective:     Vital Signs (Most Recent):  Temp: 98 °F (36.7 °C) (19)  Pulse: 79 (19 003)  Resp: 19 (19)  BP: (!) 152/67 (19)  SpO2: 100 % (19) Vital Signs (24h Range):  Temp:  [98 °F (36.7 °C)-99.1 °F (37.3 °C)] 98 °F (36.7 °C)  Pulse:  [] 79  Resp:  [18-40] 19  SpO2:  [94 %-100 %] 100 %  BP: (115-236)/() 152/67        There is no height or weight on file to calculate BMI.  There is no height or weight on file to calculate BSA.    ECOG SCORE         [unfilled]    Lines/Drains/Airways     Peripheral Intravenous Line                 Peripheral IV - Single Lumen 19 Left Antecubital less than 1 day         Peripheral IV - Single Lumen 19 Right Antecubital less than 1 day                Physical Exam   Constitutional: She is oriented to person, place, and time. She appears well-developed and well-nourished. No distress.   HENT:   Head: Normocephalic and atraumatic.    Eyes: EOM are normal. Pupils are equal, round, and reactive to light. No scleral icterus.   Conjunctival pallor    Neck: Normal range of motion. Neck supple.   Cardiovascular: Normal rate and regular rhythm.   No murmur heard.  Pulmonary/Chest: Effort normal and breath sounds normal. No respiratory distress. She has no wheezes. She exhibits no tenderness.   Abdominal: Soft. Bowel sounds are normal. She exhibits no distension. There is no tenderness. There is no guarding.   Musculoskeletal: Normal range of motion. She exhibits no edema or deformity.   Neurological: She is alert and oriented to person, place, and time. She has normal reflexes. No cranial nerve deficit.   Skin: Skin is warm and dry. Capillary refill takes less than 2 seconds. No erythema.       Significant Labs:   CBC:   Recent Labs   Lab 03/12/19  1415 03/12/19 1951   WBC 1.67* 5.86   HGB 5.6* 7.0*   HCT 17.4* 22.6*   PLT 28* 41*    and CMP:   Recent Labs   Lab 03/12/19 1415 03/12/19 1951    140   K 3.9 3.4*    104   CO2 23 12*   * 290*   BUN 17 19   CREATININE 1.1 1.3   CALCIUM 9.5 9.2   PROT 6.8 6.8   ALBUMIN 3.6 3.6   BILITOT 1.4* 3.1*   ALKPHOS 76 78   AST 32 41*   * 101*   ANIONGAP 11 24*   EGFRNONAA 51.7* 42.3*       Diagnostic Results:  I have reviewed all pertinent imaging results/findings within the past 24 hours.   negative

## 2025-06-25 NOTE — TELEPHONE ENCOUNTER
----- Message from Mirlande Cast sent at 1/3/2017  8:36 AM CST -----  Contact: self  Pt is calling to find out when her bone marrow bx will be and what time. She states she also has questions about following up after it.     Contact number is [phone]   No

## 2025-07-16 NOTE — ASSESSMENT & PLAN NOTE
- reports difficulty with ambulation due to pain and weakness  - PT/OT consulted. Recommending SNF.  - Adding Boost with meals for nutritional support   Pre visit planning completed.      Procedure details:    Patient scheduled for Colonoscopy on 7/29/25.     Approximate arrival time: 0655. Procedure time 0740.   *Ensure patient is aware that endoscopy team will be calling about 2 days prior to procedure date to confirm arrival time as this may change.     Facility location: [facility]; [address], [place] [address]. Check in location: 2nd Floor at Surgery desk.  *Disclaimer: Drivers are to check in with patient and stay on campus during procedure.     Sedation type: Conscious sedation     Pre op exam needed? No.    Indication for procedure: Screening       Chart review:     Electronic implanted devices? No    Recent diagnosis of diverticulitis within the last 6 weeks? No      Medication review:    Diabetic? No    Anticoagulants? No    Weight loss medication/injectable? No GLP-1 medication per patient's medication list. Nursing to verify with pre-assessment call.    Other medication HOLDING recommendations:  N/A      Prep for procedure:     Bowel prep recommendation: Standard Miralax.   Due to: standard bowel prep    Procedure information and instructions sent via [employer]Me         Brit Antoine RN  Endoscopy Procedure Pre Assessment   [phone] option 3

## (undated) DEVICE — SUT MCRYL PLUS 4-0 PS2 27IN

## (undated) DEVICE — SEE MEDLINE ITEM 157117

## (undated) DEVICE — DRAPE C ARM 42 X 120 10/BX

## (undated) DEVICE — SUT VICRYL 3-0 27 SH

## (undated) DEVICE — SOL NACL 0.9% INJ PF/50151

## (undated) DEVICE — SEE MEDLINE ITEM 157128

## (undated) DEVICE — GOWN SURG 2XL DISP TIE BACK

## (undated) DEVICE — DRESSING LEUKOPLAST FLEX 1X3IN

## (undated) DEVICE — SET DECANTER MEDICHOICE

## (undated) DEVICE — SYR SLIP TIP 10ML SHIELD

## (undated) DEVICE — BLADE SURG CARBON STEEL SZ11

## (undated) DEVICE — NDL SPINAL 20GX3.5 HUB

## (undated) DEVICE — SYR DISP LL 5CC

## (undated) DEVICE — SUT 3-0 12-18IN SILK

## (undated) DEVICE — TRAY MINOR GEN SURG

## (undated) DEVICE — SEE MEDLINE ITEM 157131

## (undated) DEVICE — BANDAID STRIP PLASTIC 3/4X3

## (undated) DEVICE — CUP MEDICINE STERILE 2OZ

## (undated) DEVICE — SYR ONLY LUER LOCK 20CC

## (undated) DEVICE — SUT PROLENE 0 MO6 30IN BLUE

## (undated) DEVICE — DRAPE THYROID WITH ARMBOARD

## (undated) DEVICE — ELECTRODE REM PLYHSV RETURN 9